# Patient Record
Sex: MALE | Race: WHITE | NOT HISPANIC OR LATINO
[De-identification: names, ages, dates, MRNs, and addresses within clinical notes are randomized per-mention and may not be internally consistent; named-entity substitution may affect disease eponyms.]

---

## 2017-04-04 PROBLEM — Z00.00 ENCOUNTER FOR PREVENTIVE HEALTH EXAMINATION: Status: ACTIVE | Noted: 2017-04-04

## 2017-04-13 ENCOUNTER — RECORD ABSTRACTING (OUTPATIENT)
Age: 62
End: 2017-04-13

## 2017-04-13 DIAGNOSIS — K57.90 DIVERTICULOSIS OF INTESTINE, PART UNSPECIFIED, W/OUT PERFORATION OR ABSCESS W/OUT BLEEDING: ICD-10-CM

## 2017-04-13 DIAGNOSIS — Z87.440 PERSONAL HISTORY OF URINARY (TRACT) INFECTIONS: ICD-10-CM

## 2017-04-13 DIAGNOSIS — R56.9 UNSPECIFIED CONVULSIONS: ICD-10-CM

## 2017-04-13 DIAGNOSIS — Z87.19 PERSONAL HISTORY OF OTHER DISEASES OF THE DIGESTIVE SYSTEM: ICD-10-CM

## 2017-04-20 ENCOUNTER — APPOINTMENT (OUTPATIENT)
Dept: UROLOGY | Facility: CLINIC | Age: 62
End: 2017-04-20

## 2017-04-22 ENCOUNTER — EMERGENCY (EMERGENCY)
Facility: HOSPITAL | Age: 62
LOS: 0 days | Discharge: HOME | End: 2017-04-22

## 2017-04-25 ENCOUNTER — APPOINTMENT (OUTPATIENT)
Dept: SURGERY | Facility: CLINIC | Age: 62
End: 2017-04-25

## 2017-04-25 VITALS — SYSTOLIC BLOOD PRESSURE: 118 MMHG | DIASTOLIC BLOOD PRESSURE: 76 MMHG

## 2017-04-25 DIAGNOSIS — R13.10 DYSPHAGIA, UNSPECIFIED: ICD-10-CM

## 2017-05-02 PROBLEM — R13.10 DYSPHAGIA, UNSPECIFIED TYPE: Status: RESOLVED | Noted: 2017-04-25 | Resolved: 2017-05-02

## 2017-05-16 ENCOUNTER — APPOINTMENT (OUTPATIENT)
Dept: UROLOGY | Facility: CLINIC | Age: 62
End: 2017-05-16

## 2017-06-27 DIAGNOSIS — Z93.1 GASTROSTOMY STATUS: ICD-10-CM

## 2017-06-27 DIAGNOSIS — G82.50 QUADRIPLEGIA, UNSPECIFIED: ICD-10-CM

## 2017-06-27 DIAGNOSIS — K94.22 GASTROSTOMY INFECTION: ICD-10-CM

## 2017-07-12 ENCOUNTER — APPOINTMENT (OUTPATIENT)
Dept: UROLOGY | Facility: CLINIC | Age: 62
End: 2017-07-12

## 2017-07-12 VITALS — SYSTOLIC BLOOD PRESSURE: 120 MMHG | DIASTOLIC BLOOD PRESSURE: 77 MMHG | HEART RATE: 80 BPM

## 2017-07-12 DIAGNOSIS — Z78.9 OTHER SPECIFIED HEALTH STATUS: ICD-10-CM

## 2017-07-27 ENCOUNTER — OUTPATIENT (OUTPATIENT)
Dept: OUTPATIENT SERVICES | Facility: HOSPITAL | Age: 62
LOS: 1 days | Discharge: HOME | End: 2017-07-27

## 2017-07-27 DIAGNOSIS — Z01.818 ENCOUNTER FOR OTHER PREPROCEDURAL EXAMINATION: ICD-10-CM

## 2017-07-27 DIAGNOSIS — N20.2 CALCULUS OF KIDNEY WITH CALCULUS OF URETER: ICD-10-CM

## 2017-07-27 DIAGNOSIS — G80.1 SPASTIC DIPLEGIC CEREBRAL PALSY: ICD-10-CM

## 2017-07-27 DIAGNOSIS — R13.10 DYSPHAGIA, UNSPECIFIED: ICD-10-CM

## 2017-07-27 DIAGNOSIS — G40.909 EPILEPSY, UNSPECIFIED, NOT INTRACTABLE, WITHOUT STATUS EPILEPTICUS: ICD-10-CM

## 2017-08-04 ENCOUNTER — OUTPATIENT (OUTPATIENT)
Dept: OUTPATIENT SERVICES | Facility: HOSPITAL | Age: 62
LOS: 1 days | Discharge: HOME | End: 2017-08-04

## 2017-08-04 ENCOUNTER — APPOINTMENT (OUTPATIENT)
Dept: UROLOGY | Facility: CLINIC | Age: 62
End: 2017-08-04
Payer: MEDICARE

## 2017-08-04 DIAGNOSIS — R13.10 DYSPHAGIA, UNSPECIFIED: ICD-10-CM

## 2017-08-04 DIAGNOSIS — G40.909 EPILEPSY, UNSPECIFIED, NOT INTRACTABLE, WITHOUT STATUS EPILEPTICUS: ICD-10-CM

## 2017-08-04 DIAGNOSIS — G80.1 SPASTIC DIPLEGIC CEREBRAL PALSY: ICD-10-CM

## 2017-08-04 PROCEDURE — 52005 CYSTO W/URTRL CATHJ: CPT | Mod: 59

## 2017-08-04 PROCEDURE — 52332 CYSTOSCOPY AND TREATMENT: CPT | Mod: LT

## 2017-08-15 ENCOUNTER — OUTPATIENT (OUTPATIENT)
Dept: OUTPATIENT SERVICES | Facility: HOSPITAL | Age: 62
LOS: 1 days | Discharge: HOME | End: 2017-08-15

## 2017-08-15 DIAGNOSIS — G80.1 SPASTIC DIPLEGIC CEREBRAL PALSY: ICD-10-CM

## 2017-08-15 DIAGNOSIS — R13.10 DYSPHAGIA, UNSPECIFIED: ICD-10-CM

## 2017-08-15 DIAGNOSIS — G40.909 EPILEPSY, UNSPECIFIED, NOT INTRACTABLE, WITHOUT STATUS EPILEPTICUS: ICD-10-CM

## 2017-08-18 DIAGNOSIS — N13.2 HYDRONEPHROSIS WITH RENAL AND URETERAL CALCULOUS OBSTRUCTION: ICD-10-CM

## 2017-08-18 DIAGNOSIS — K59.00 CONSTIPATION, UNSPECIFIED: ICD-10-CM

## 2017-08-18 DIAGNOSIS — R07.9 CHEST PAIN, UNSPECIFIED: ICD-10-CM

## 2017-08-18 DIAGNOSIS — Q43.8 OTHER SPECIFIED CONGENITAL MALFORMATIONS OF INTESTINE: ICD-10-CM

## 2017-08-18 DIAGNOSIS — K64.8 OTHER HEMORRHOIDS: ICD-10-CM

## 2017-09-19 ENCOUNTER — OUTPATIENT (OUTPATIENT)
Dept: OUTPATIENT SERVICES | Facility: HOSPITAL | Age: 62
LOS: 1 days | Discharge: HOME | End: 2017-09-19

## 2017-09-19 DIAGNOSIS — N20.2 CALCULUS OF KIDNEY WITH CALCULUS OF URETER: ICD-10-CM

## 2017-09-19 DIAGNOSIS — Z01.818 ENCOUNTER FOR OTHER PREPROCEDURAL EXAMINATION: ICD-10-CM

## 2017-09-19 DIAGNOSIS — G80.1 SPASTIC DIPLEGIC CEREBRAL PALSY: ICD-10-CM

## 2017-09-19 DIAGNOSIS — G40.909 EPILEPSY, UNSPECIFIED, NOT INTRACTABLE, WITHOUT STATUS EPILEPTICUS: ICD-10-CM

## 2017-09-19 DIAGNOSIS — R13.10 DYSPHAGIA, UNSPECIFIED: ICD-10-CM

## 2017-09-22 ENCOUNTER — APPOINTMENT (OUTPATIENT)
Dept: UROLOGY | Facility: HOSPITAL | Age: 62
End: 2017-09-22
Payer: MEDICARE

## 2017-09-22 ENCOUNTER — OUTPATIENT (OUTPATIENT)
Dept: OUTPATIENT SERVICES | Facility: HOSPITAL | Age: 62
LOS: 1 days | Discharge: HOME | End: 2017-09-22

## 2017-09-22 DIAGNOSIS — R13.10 DYSPHAGIA, UNSPECIFIED: ICD-10-CM

## 2017-09-22 DIAGNOSIS — G80.1 SPASTIC DIPLEGIC CEREBRAL PALSY: ICD-10-CM

## 2017-09-22 DIAGNOSIS — G40.909 EPILEPSY, UNSPECIFIED, NOT INTRACTABLE, WITHOUT STATUS EPILEPTICUS: ICD-10-CM

## 2017-09-22 PROCEDURE — 52356 CYSTO/URETERO W/LITHOTRIPSY: CPT | Mod: LT

## 2017-09-27 DIAGNOSIS — N20.2 CALCULUS OF KIDNEY WITH CALCULUS OF URETER: ICD-10-CM

## 2017-10-11 ENCOUNTER — APPOINTMENT (OUTPATIENT)
Dept: UROLOGY | Facility: CLINIC | Age: 62
End: 2017-10-11
Payer: MEDICARE

## 2017-10-11 PROCEDURE — 52310 CYSTOSCOPY AND TREATMENT: CPT

## 2017-10-11 RX ORDER — KETOCONAZOLE 20 MG/G
2 CREAM TOPICAL
Refills: 0 | Status: ACTIVE | COMMUNITY

## 2017-10-11 RX ORDER — HYDROCORTISONE 25 MG/G
2.5 CREAM TOPICAL
Refills: 0 | Status: ACTIVE | COMMUNITY

## 2017-10-11 RX ORDER — CICLOPIROX 7.7 MG/G
0.77 GEL TOPICAL
Refills: 0 | Status: ACTIVE | COMMUNITY

## 2017-10-11 RX ORDER — MINERAL OIL, PETROLATUM, PHENYLEPHRINE HCL 2.5; 140; 749 MG/G; MG/G; MG/G
0.25-14-74.9 OINTMENT TOPICAL
Refills: 0 | Status: ACTIVE | COMMUNITY

## 2017-10-11 RX ORDER — HYDROCORTISONE 1 %
12 CREAM (GRAM) TOPICAL
Refills: 0 | Status: ACTIVE | COMMUNITY

## 2017-10-11 RX ORDER — NYSTATIN 100000 [USP'U]/ML
100000 SUSPENSION ORAL
Refills: 0 | Status: ACTIVE | COMMUNITY

## 2017-10-11 RX ORDER — ALCLOMETASONE DIPROPIONATE 0.5 MG/G
0.05 CREAM TOPICAL
Refills: 0 | Status: ACTIVE | COMMUNITY

## 2017-10-11 RX ORDER — MAGNESIUM HYDROXIDE 400 MG/5ML
400 SUSPENSION, ORAL (FINAL DOSE FORM) ORAL
Refills: 0 | Status: ACTIVE | COMMUNITY

## 2017-10-11 RX ORDER — CHLORHEXIDINE GLUCONATE, 0.12% ORAL RINSE 1.2 MG/ML
0.12 SOLUTION DENTAL
Refills: 0 | Status: ACTIVE | COMMUNITY

## 2017-10-11 RX ORDER — POLYVINYL ALCOHOL 14 MG/ML
1.4 SOLUTION/ DROPS OPHTHALMIC
Refills: 0 | Status: ACTIVE | COMMUNITY

## 2017-10-11 RX ORDER — DESONIDE 0.5 MG/G
0.05 CREAM TOPICAL
Refills: 0 | Status: ACTIVE | COMMUNITY

## 2017-10-16 ENCOUNTER — EMERGENCY (EMERGENCY)
Facility: HOSPITAL | Age: 62
LOS: 0 days | Discharge: HOME | End: 2017-10-16

## 2017-10-16 DIAGNOSIS — K94.23 GASTROSTOMY MALFUNCTION: ICD-10-CM

## 2017-10-16 DIAGNOSIS — G40.909 EPILEPSY, UNSPECIFIED, NOT INTRACTABLE, WITHOUT STATUS EPILEPTICUS: ICD-10-CM

## 2017-10-16 DIAGNOSIS — R13.10 DYSPHAGIA, UNSPECIFIED: ICD-10-CM

## 2017-10-16 DIAGNOSIS — G80.1 SPASTIC DIPLEGIC CEREBRAL PALSY: ICD-10-CM

## 2017-12-06 ENCOUNTER — EMERGENCY (EMERGENCY)
Facility: HOSPITAL | Age: 62
LOS: 0 days | Discharge: HOME | End: 2017-12-06

## 2017-12-06 DIAGNOSIS — Z79.899 OTHER LONG TERM (CURRENT) DRUG THERAPY: ICD-10-CM

## 2017-12-06 DIAGNOSIS — F79 UNSPECIFIED INTELLECTUAL DISABILITIES: ICD-10-CM

## 2017-12-06 DIAGNOSIS — R56.9 UNSPECIFIED CONVULSIONS: ICD-10-CM

## 2017-12-06 DIAGNOSIS — K94.23 GASTROSTOMY MALFUNCTION: ICD-10-CM

## 2017-12-06 DIAGNOSIS — G80.1 SPASTIC DIPLEGIC CEREBRAL PALSY: ICD-10-CM

## 2017-12-06 DIAGNOSIS — R13.10 DYSPHAGIA, UNSPECIFIED: ICD-10-CM

## 2017-12-06 DIAGNOSIS — G40.909 EPILEPSY, UNSPECIFIED, NOT INTRACTABLE, WITHOUT STATUS EPILEPTICUS: ICD-10-CM

## 2017-12-13 ENCOUNTER — APPOINTMENT (OUTPATIENT)
Dept: UROLOGY | Facility: CLINIC | Age: 62
End: 2017-12-13

## 2017-12-15 ENCOUNTER — RX RENEWAL (OUTPATIENT)
Age: 62
End: 2017-12-15

## 2018-01-25 ENCOUNTER — EMERGENCY (EMERGENCY)
Facility: HOSPITAL | Age: 63
LOS: 0 days | Discharge: NURSING, REHAB OR LONG-TERM CARE | End: 2018-01-26

## 2018-01-25 DIAGNOSIS — R13.10 DYSPHAGIA, UNSPECIFIED: ICD-10-CM

## 2018-01-25 DIAGNOSIS — N39.0 URINARY TRACT INFECTION, SITE NOT SPECIFIED: ICD-10-CM

## 2018-01-25 DIAGNOSIS — Z79.899 OTHER LONG TERM (CURRENT) DRUG THERAPY: ICD-10-CM

## 2018-01-25 DIAGNOSIS — G40.909 EPILEPSY, UNSPECIFIED, NOT INTRACTABLE, WITHOUT STATUS EPILEPTICUS: ICD-10-CM

## 2018-01-25 DIAGNOSIS — Z79.51 LONG TERM (CURRENT) USE OF INHALED STEROIDS: ICD-10-CM

## 2018-01-25 DIAGNOSIS — R30.0 DYSURIA: ICD-10-CM

## 2018-01-25 DIAGNOSIS — G80.1 SPASTIC DIPLEGIC CEREBRAL PALSY: ICD-10-CM

## 2018-01-25 DIAGNOSIS — R56.9 UNSPECIFIED CONVULSIONS: ICD-10-CM

## 2018-01-29 ENCOUNTER — APPOINTMENT (OUTPATIENT)
Dept: UROLOGY | Facility: CLINIC | Age: 63
End: 2018-01-29
Payer: MEDICARE

## 2018-01-29 VITALS — HEART RATE: 95 BPM | DIASTOLIC BLOOD PRESSURE: 71 MMHG | SYSTOLIC BLOOD PRESSURE: 103 MMHG

## 2018-01-29 PROCEDURE — 99213 OFFICE O/P EST LOW 20 MIN: CPT

## 2018-02-26 ENCOUNTER — APPOINTMENT (OUTPATIENT)
Dept: UROLOGY | Facility: CLINIC | Age: 63
End: 2018-02-26
Payer: MEDICARE

## 2018-02-26 PROCEDURE — 99213 OFFICE O/P EST LOW 20 MIN: CPT

## 2018-03-26 ENCOUNTER — APPOINTMENT (OUTPATIENT)
Dept: UROLOGY | Facility: CLINIC | Age: 63
End: 2018-03-26
Payer: MEDICARE

## 2018-03-26 VITALS — DIASTOLIC BLOOD PRESSURE: 69 MMHG | HEART RATE: 70 BPM | SYSTOLIC BLOOD PRESSURE: 104 MMHG

## 2018-03-26 PROCEDURE — 99213 OFFICE O/P EST LOW 20 MIN: CPT

## 2018-03-26 NOTE — LETTER BODY
[Dear  ___] : Dear  [unfilled], [I had the pleasure of evaluating your patient, [unfilled]. Thank you for referring this patient for consultation for ___] : I had the pleasure of evaluating your patient, [unfilled]. Thank you for referring this patient for consultation for [unfilled]. [Attached please find my note.] : Attached please find my note. [Thank you very much for allowing me to participate in the care of this patient. If you have any questions, please do not hesitate to contact me] : Thank you very much for allowing me to participate in the care of this patient. If you have any questions, please do not hesitate to contact me. [FreeTextEntry2] : Dr. Brigido aRo

## 2018-03-26 NOTE — PHYSICAL EXAM
[General Appearance - Well Developed] : well developed [General Appearance - Well Nourished] : well nourished [Normal Appearance] : normal appearance [Well Groomed] : well groomed [General Appearance - In No Acute Distress] : no acute distress [Abdomen Soft] : soft [Abdomen Tenderness] : non-tender [Costovertebral Angle Tenderness] : no ~M costovertebral angle tenderness [Edema] : no peripheral edema [] : no respiratory distress [Respiration, Rhythm And Depth] : normal respiratory rhythm and effort [Exaggerated Use Of Accessory Muscles For Inspiration] : no accessory muscle use [Oriented To Time, Place, And Person] : oriented to person, place, and time [Mood] : the mood was normal [Not Anxious] : not anxious [No Focal Deficits] : no focal deficits [FreeTextEntry1] : Wheelchair bound

## 2018-03-26 NOTE — HISTORY OF PRESENT ILLNESS
[Currently Experiencing ___] :  [unfilled] [Urinary Incontinence] : urinary incontinence [Urinary Retention] : urinary retention [Urinary Frequency] : urinary frequency [Dysuria] : dysuria [None] : None [FreeTextEntry1] : TISH SHAIKH is a 62 year old male with a past medical history of cerebral palsy and urinary retention . Presents to the office today for a follow up, last seen on 2/26/2018. Patient resides in [facility] and is here accompanied by caregiver. Patient on Finasteride 5 mg daily and Flomax and no side effects reported, for BPH with urinary obstruction.Patient did not see a nephrologist as of now, as per Sadia, Nurse states that appointment will be made. Patient currently experiencing dysuria and urinary frequency x 3 days, no fever or chills. Patient PVR in office is 89 ml, patient is incontinent, wears adult depends daily.\par Patient UA and Urine culture ordered, nurse to perform in home. \par US 3/15/2018.- Prevoid 293 cc, Postvoid 218 cc, Prostate not assessed, Diffuse bladder wall thickening up to 4.6 mm. No bladder mass and no calculus.  [Urinary Urgency] : no urinary urgency [Hematuria - Gross] : no gross hematuria

## 2018-03-26 NOTE — ASSESSMENT
[FreeTextEntry1] : TISH SHAIKH is a 62 year old male with a past medical history of cerebral palsy and urinary retention . Presents to the office today for a follow up, last seen on 2/26/2018. Patient resides in [facility] and is here accompanied by caregiver. Patient on Finasteride 5 mg daily and Flomax and no side effects reported, for BPH with urinary obstruction.Patient did not see a nephrologist as of now, as per Sadia, Nurse states that appointment will be made. Patient currently experiencing dysuria and urinary frequency x 3 days, no fever or chills. Patient PVR in office is 89 ml, patient is incontinent, wears adult depends daily.\par Patient UA and Urine culture ordered, nurse to perform in home. \par US 3/15/2018.- Prevoid 293 cc, Postvoid 218 cc, Prostate unable to assess, Diffuse bladder wall thickening up to 4.6 cm. No bladder mass and no calculus.

## 2018-03-27 ENCOUNTER — EMERGENCY (EMERGENCY)
Facility: HOSPITAL | Age: 63
LOS: 0 days | Discharge: HOME | End: 2018-03-27
Attending: EMERGENCY MEDICINE

## 2018-03-27 VITALS
TEMPERATURE: 98 F | SYSTOLIC BLOOD PRESSURE: 110 MMHG | OXYGEN SATURATION: 96 % | RESPIRATION RATE: 20 BRPM | HEART RATE: 80 BPM | DIASTOLIC BLOOD PRESSURE: 58 MMHG

## 2018-03-27 VITALS
OXYGEN SATURATION: 97 % | HEART RATE: 81 BPM | SYSTOLIC BLOOD PRESSURE: 142 MMHG | RESPIRATION RATE: 19 BRPM | DIASTOLIC BLOOD PRESSURE: 61 MMHG | TEMPERATURE: 98 F

## 2018-03-27 DIAGNOSIS — Z79.899 OTHER LONG TERM (CURRENT) DRUG THERAPY: ICD-10-CM

## 2018-03-27 DIAGNOSIS — Z93.1 GASTROSTOMY STATUS: Chronic | ICD-10-CM

## 2018-03-27 DIAGNOSIS — R33.9 RETENTION OF URINE, UNSPECIFIED: ICD-10-CM

## 2018-03-27 DIAGNOSIS — Z98.890 OTHER SPECIFIED POSTPROCEDURAL STATES: ICD-10-CM

## 2018-03-27 DIAGNOSIS — R30.0 DYSURIA: ICD-10-CM

## 2018-03-27 DIAGNOSIS — N39.0 URINARY TRACT INFECTION, SITE NOT SPECIFIED: ICD-10-CM

## 2018-03-27 LAB
ALBUMIN SERPL ELPH-MCNC: 4 G/DL — SIGNIFICANT CHANGE UP (ref 3.5–5.2)
ALP SERPL-CCNC: 86 U/L — SIGNIFICANT CHANGE UP (ref 30–115)
ALT FLD-CCNC: 20 U/L — SIGNIFICANT CHANGE UP (ref 0–41)
ANION GAP SERPL CALC-SCNC: 11 MMOL/L — SIGNIFICANT CHANGE UP (ref 7–14)
APPEARANCE UR: (no result)
AST SERPL-CCNC: 31 U/L — SIGNIFICANT CHANGE UP (ref 0–41)
BASOPHILS # BLD AUTO: 0.02 K/UL — SIGNIFICANT CHANGE UP (ref 0–0.2)
BASOPHILS NFR BLD AUTO: 0.4 % — SIGNIFICANT CHANGE UP (ref 0–1)
BILIRUB SERPL-MCNC: 0.3 MG/DL — SIGNIFICANT CHANGE UP (ref 0.2–1.2)
BILIRUB UR-MCNC: NEGATIVE — SIGNIFICANT CHANGE UP
BUN SERPL-MCNC: 21 MG/DL — HIGH (ref 10–20)
CALCIUM SERPL-MCNC: 8.9 MG/DL — SIGNIFICANT CHANGE UP (ref 8.5–10.1)
CHLORIDE SERPL-SCNC: 100 MMOL/L — SIGNIFICANT CHANGE UP (ref 98–110)
CO2 SERPL-SCNC: 27 MMOL/L — SIGNIFICANT CHANGE UP (ref 17–32)
COLOR SPEC: YELLOW — SIGNIFICANT CHANGE UP
CREAT SERPL-MCNC: 0.5 MG/DL — LOW (ref 0.7–1.5)
DIFF PNL FLD: (no result)
EOSINOPHIL # BLD AUTO: 0.26 K/UL — SIGNIFICANT CHANGE UP (ref 0–0.7)
EOSINOPHIL NFR BLD AUTO: 5.1 % — SIGNIFICANT CHANGE UP (ref 0–8)
GLUCOSE SERPL-MCNC: 96 MG/DL — SIGNIFICANT CHANGE UP (ref 70–99)
GLUCOSE UR QL: NEGATIVE MG/DL — SIGNIFICANT CHANGE UP
HCT VFR BLD CALC: 40 % — LOW (ref 42–52)
HGB BLD-MCNC: 13.7 G/DL — LOW (ref 14–18)
IMM GRANULOCYTES NFR BLD AUTO: 0.2 % — SIGNIFICANT CHANGE UP (ref 0.1–0.3)
KETONES UR-MCNC: NEGATIVE — SIGNIFICANT CHANGE UP
LEUKOCYTE ESTERASE UR-ACNC: (no result)
LYMPHOCYTES # BLD AUTO: 1.06 K/UL — LOW (ref 1.2–3.4)
LYMPHOCYTES # BLD AUTO: 20.8 % — SIGNIFICANT CHANGE UP (ref 20.5–51.1)
MCHC RBC-ENTMCNC: 31.1 PG — HIGH (ref 27–31)
MCHC RBC-ENTMCNC: 34.3 G/DL — SIGNIFICANT CHANGE UP (ref 32–37)
MCV RBC AUTO: 90.9 FL — SIGNIFICANT CHANGE UP (ref 80–94)
MONOCYTES # BLD AUTO: 0.52 K/UL — SIGNIFICANT CHANGE UP (ref 0.1–0.6)
MONOCYTES NFR BLD AUTO: 10.2 % — HIGH (ref 1.7–9.3)
NEUTROPHILS # BLD AUTO: 3.22 K/UL — SIGNIFICANT CHANGE UP (ref 1.4–6.5)
NEUTROPHILS NFR BLD AUTO: 63.3 % — SIGNIFICANT CHANGE UP (ref 42.2–75.2)
NITRITE UR-MCNC: NEGATIVE — SIGNIFICANT CHANGE UP
PH UR: 7.5 — SIGNIFICANT CHANGE UP (ref 5–8)
PLATELET # BLD AUTO: 182 K/UL — SIGNIFICANT CHANGE UP (ref 130–400)
POTASSIUM SERPL-MCNC: 4.4 MMOL/L — SIGNIFICANT CHANGE UP (ref 3.5–5)
POTASSIUM SERPL-SCNC: 4.4 MMOL/L — SIGNIFICANT CHANGE UP (ref 3.5–5)
PROT SERPL-MCNC: 6.7 G/DL — SIGNIFICANT CHANGE UP (ref 6–8)
PROT UR-MCNC: 30 MG/DL
RBC # BLD: 4.4 M/UL — LOW (ref 4.7–6.1)
RBC # FLD: 13.4 % — SIGNIFICANT CHANGE UP (ref 11.5–14.5)
RBC CASTS # UR COMP ASSIST: (no result) /HPF
SODIUM SERPL-SCNC: 138 MMOL/L — SIGNIFICANT CHANGE UP (ref 135–146)
SP GR SPEC: 1.01 — SIGNIFICANT CHANGE UP (ref 1.01–1.03)
UROBILINOGEN FLD QL: 0.2 MG/DL — SIGNIFICANT CHANGE UP (ref 0.2–0.2)
WBC # BLD: 5.09 K/UL — SIGNIFICANT CHANGE UP (ref 4.8–10.8)
WBC # FLD AUTO: 5.09 K/UL — SIGNIFICANT CHANGE UP (ref 4.8–10.8)
WBC UR QL: SIGNIFICANT CHANGE UP /HPF

## 2018-03-27 RX ORDER — LIDOCAINE HCL 20 MG/ML
5 VIAL (ML) INJECTION ONCE
Qty: 0 | Refills: 0 | Status: COMPLETED | OUTPATIENT
Start: 2018-03-27 | End: 2018-03-27

## 2018-03-27 RX ORDER — CIPROFLOXACIN LACTATE 400MG/40ML
500 VIAL (ML) INTRAVENOUS EVERY 12 HOURS
Qty: 0 | Refills: 0 | Status: DISCONTINUED | OUTPATIENT
Start: 2018-03-27 | End: 2018-03-27

## 2018-03-27 RX ORDER — MORPHINE SULFATE 50 MG/1
4 CAPSULE, EXTENDED RELEASE ORAL ONCE
Qty: 0 | Refills: 0 | Status: DISCONTINUED | OUTPATIENT
Start: 2018-03-27 | End: 2018-03-27

## 2018-03-27 RX ORDER — CIPROFLOXACIN LACTATE 400MG/40ML
1 VIAL (ML) INTRAVENOUS
Qty: 27 | Refills: 0 | OUTPATIENT
Start: 2018-03-27 | End: 2018-04-09

## 2018-03-27 RX ORDER — SODIUM CHLORIDE 9 MG/ML
1000 INJECTION INTRAMUSCULAR; INTRAVENOUS; SUBCUTANEOUS ONCE
Qty: 0 | Refills: 0 | Status: COMPLETED | OUTPATIENT
Start: 2018-03-27 | End: 2018-03-27

## 2018-03-27 RX ORDER — ONDANSETRON 8 MG/1
4 TABLET, FILM COATED ORAL ONCE
Qty: 0 | Refills: 0 | Status: COMPLETED | OUTPATIENT
Start: 2018-03-27 | End: 2018-03-27

## 2018-03-27 RX ORDER — CIPROFLOXACIN LACTATE 400MG/40ML
500 VIAL (ML) INTRAVENOUS ONCE
Qty: 0 | Refills: 0 | Status: COMPLETED | OUTPATIENT
Start: 2018-03-27 | End: 2018-03-27

## 2018-03-27 RX ADMIN — Medication 5 MILLILITER(S): at 19:19

## 2018-03-27 RX ADMIN — Medication 500 MILLIGRAM(S): at 19:56

## 2018-03-27 RX ADMIN — ONDANSETRON 4 MILLIGRAM(S): 8 TABLET, FILM COATED ORAL at 21:15

## 2018-03-27 RX ADMIN — MORPHINE SULFATE 4 MILLIGRAM(S): 50 CAPSULE, EXTENDED RELEASE ORAL at 19:19

## 2018-03-27 NOTE — ED PROVIDER NOTE - ATTENDING CONTRIBUTION TO CARE
62M PMH Cerebral palsy, wheelchair bound, PEG tube, kidney stones, UTIs, BPH, p/w 3-4 days dysuria, freq. No hematuria. No abdominal pain. No flank pain. No fever, chills. No nvdc. No cp, sob. Seen at urology clinic yest dr caceres, had PVR =89, UA/cx ordered but unable to obtain. lives a facility, aid at bedside. on exam, AFVSS, well dee nad, ncat, eomi, perrla, mmm, lctab, rrr nl s1s2 no mrg, abd soft ntnd, no cvat, aaox3, bilat UE contractures, no facial droop, +developmental delay, no le edema or calf ttp, a/p;   Concern for UTI, will do labs, UA/Cx, and re-eval. H&P not c/w pyelo, renal colic.

## 2018-03-27 NOTE — ED PROVIDER NOTE - PROGRESS NOTE DETAILS
RN unable to get urine specimen. Called urology PA at [phone] who has agreed to help collect the specimen CATIE HUNG Richard unable to obtain urine, Dr Joyner was able to get UA specimen, is now placing alatorre over wire.  pt post void residual in  cc per Dr Joyner Dr Caceres placed alatorre with cystoscopy at bedside, recommends dc home w cipro, 1 week outpt f/u dr caceres pt states he feels nauseous after cipro flushed peg by RN, abd soft NTND, no abd pain, pt states he feels much better after alatorre placed, which is draining clear yellow urine, will give zofran OTD, strict return precautions provided to patient and aid at bedside pt states he feels nauseous after cipro flushed peg by RN, abd soft NTND, no abd pain, pt states he feels much better after alatorre placed, which is draining clear yellow urine, will give zofran OTD, strict return precautions provided to patient and aid at bedside. Discussed w CATIE Samayoa who agrees.

## 2018-03-27 NOTE — CONSULT NOTE ADULT - ATTENDING COMMENTS
Agree with above.   Patient with prior history of rentention.  was seen in office this week with a PVR on bladder scan of about 80ml, however patient returned to ER next day with urinary retention. Bedside scan showed about 500ml urine.  Multiple atttemts to catheterize failed    Procedure Note:  Cysto at bedside was performed using sterile technique.  multiple soft stricutres seen throughout penile urethra.  firm stricture at the bulbous urethra  this stricutre was wired with senor wire and the cystoscope was pushed through into the bladder which was filled with cloudy urine  The patient then spontaneously voided  The wire was advanced into the bladder and a 16F coude catheter was placed  Patient tolerated the procedure well  Was sent home with one week of abx  Would benefit from SPT placement in OR

## 2018-03-27 NOTE — CONSULT NOTE ADULT - SUBJECTIVE AND OBJECTIVE BOX
Patient is a 62y old  Male who presents with a chief complaint of dysuria. pt unable to empty bladder well. Has hx of cerebral palsy, has required alatorre cath at home multiple times in the past. bedside sono shows distended bladder. No fevers, chills, N/V. Attempted to straight cath by nurse, was unable to get sample. attempted to pass 18 fr coude cath under sterile technique, unsuccessfully. Attempted urethral dilation at bedside using sensor wire. unable to pass through successfully.    Bedside cystoscopy performed under sterile technique, pt tolerated it well, placed 16 fr [place] tip catheter. drained 200cc clear yellow urine. balloon inflated to 10cc    HPI:      PAST MEDICAL & SURGICAL HISTORY:  Seizure  Cerebral palsy  BPH (benign prostatic hyperplasia)  S/P percutaneous endoscopic gastrostomy (PEG) tube placement      REVIEW OF SYSTEMS:      MEDICATIONS  (STANDING):  ciprofloxacin     Tablet 500 milliGRAM(s) Oral Once  ciprofloxacin     Tablet 500 milliGRAM(s) Oral every 12 hours    MEDICATIONS  (PRN):      Allergies    No Known Allergies    Intolerances        SOCIAL HISTORY: No illicit drug use    FAMILY HISTORY:  Family history unknown      Vital Signs Last 24 Hrs  T(C): 36.4 (27 Mar 2018 16:00), Max: 36.8 (27 Mar 2018 13:31)  T(F): 97.5 (27 Mar 2018 16:00), Max: 98.2 (27 Mar 2018 13:31)  HR: 80 (27 Mar 2018 16:00) (80 - 81)  BP: 110/58 (27 Mar 2018 16:00) (110/58 - 142/61)  BP(mean): --  RR: 20 (27 Mar 2018 16:00) (19 - 20)  SpO2: 96% (27 Mar 2018 16:00) (96% - 97%)    PHYSICAL EXAM:    Constitutional: NAD,  HEENT: EOMI  Neck: no pain  Back: No CVA tenderness b/l  Respiratory: No accessory respiratory muscle use  Abd: Soft, NT/ND  no organomegally  no hernia, bladder nonpalp, no suprapubic tenderness      I&O's Summary      LABS:                        13.7   5.09  )-----------( 182      ( 27 Mar 2018 15:13 )             40.0     03-27    138  |  100  |  21<H>  ----------------------------<  96  4.4   |  27  |  0.5<L>    Ca    8.9      27 Mar 2018 15:13    TPro  6.7  /  Alb  4.0  /  TBili  0.3  /  DBili  x   /  AST  31  /  ALT  20  /  AlkPhos  86  03-27      Urinalysis Basic - ( 27 Mar 2018 18:22 )    Color: Yellow / Appearance: Cloudy / S.015 / pH: x  Gluc: x / Ketone: Negative  / Bili: Negative / Urobili: 0.2 mg/dL   Blood: x / Protein: 30 mg/dL / Nitrite: Negative   Leuk Esterase: Trace / RBC: 25-50 /HPF / WBC 1-2 /HPF   Sq Epi: x / Non Sq Epi: x / Bacteria: x      Urine Culture:         RADIOLOGY & ADDITIONAL STUDIES:

## 2018-03-27 NOTE — CONSULT NOTE ADULT - ASSESSMENT
63 y/o male with urethral stricture  - d/c home with alatorre catheter  - Abx x1 week  - f/u w Dr. Joyner in 1 week  - Seen, examined, and bedside procedures by Dr. Joyner

## 2018-03-27 NOTE — ED PROVIDER NOTE - OBJECTIVE STATEMENT
62M w cerebral palsy presents with dysuria for a "couple of days"  endorses burning with urination  was seen in urology yesterday, was found to have PVR of 89  known history of nephrolithiasis

## 2018-03-27 NOTE — CONSULT NOTE ADULT - CONSULT REASON
dysuria, inability to catheterize pt, urethral stricture dysuria, inability to catheterize pt, urethral stricture, urinary rentention

## 2018-03-28 LAB
CULTURE RESULTS: SIGNIFICANT CHANGE UP
SPECIMEN SOURCE: SIGNIFICANT CHANGE UP

## 2018-03-30 ENCOUNTER — APPOINTMENT (OUTPATIENT)
Dept: UROLOGY | Facility: CLINIC | Age: 63
End: 2018-03-30
Payer: MEDICARE

## 2018-03-30 VITALS — SYSTOLIC BLOOD PRESSURE: 109 MMHG | HEART RATE: 84 BPM | DIASTOLIC BLOOD PRESSURE: 68 MMHG

## 2018-03-30 PROCEDURE — 99213 OFFICE O/P EST LOW 20 MIN: CPT

## 2018-04-07 ENCOUNTER — EMERGENCY (EMERGENCY)
Facility: HOSPITAL | Age: 63
LOS: 0 days | Discharge: HOME | End: 2018-04-07
Attending: EMERGENCY MEDICINE

## 2018-04-07 VITALS
OXYGEN SATURATION: 92 % | DIASTOLIC BLOOD PRESSURE: 61 MMHG | TEMPERATURE: 98 F | RESPIRATION RATE: 18 BRPM | HEART RATE: 99 BPM | SYSTOLIC BLOOD PRESSURE: 130 MMHG

## 2018-04-07 VITALS
SYSTOLIC BLOOD PRESSURE: 132 MMHG | TEMPERATURE: 98 F | RESPIRATION RATE: 18 BRPM | OXYGEN SATURATION: 97 % | HEART RATE: 86 BPM | DIASTOLIC BLOOD PRESSURE: 69 MMHG
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
## 2018-04-07 DIAGNOSIS — Z79.899 OTHER LONG TERM (CURRENT) DRUG THERAPY: ICD-10-CM
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
## 2018-04-07 DIAGNOSIS — Z79.2 LONG TERM (CURRENT) USE OF ANTIBIOTICS: ICD-10-CM

## 2018-04-07 DIAGNOSIS — T83.038A LEAKAGE OF OTHER URINARY CATHETER, INITIAL ENCOUNTER: ICD-10-CM

## 2018-04-07 DIAGNOSIS — Z93.1 GASTROSTOMY STATUS: Chronic | ICD-10-CM

## 2018-04-07 DIAGNOSIS — N40.0 BENIGN PROSTATIC HYPERPLASIA WITHOUT LOWER URINARY TRACT SYMPTOMS: ICD-10-CM

## 2018-04-07 NOTE — ED PROVIDER NOTE - PHYSICAL EXAMINATION
VITAL SIGNS: AFebrile, vital signs stable  CONSTITUTIONAL: Well-developed; well-nourished; in no acute distress.  SKIN: Skin exam is warm and dry, no acute rash.  HEAD: Normocephalic; atraumatic.  EYES: Pupils equal round reactive to light, Extraocular movements intact; conjunctiva and sclera clear.  ENT: No nasal discharge; airway clear. Moist mucus membranes.  NECK: Supple; non tender. No rigidity  CARD: Regular rate and rhythm. Normal S1, S2; no murmurs, gallops, or rubs.  RESP: Lungs clear to auscultation bilaterally. No wheezes, rales or rhonchi.  ABD: Abdomen soft; non-tender; non-distended;  no hepatosplenomegaly. No costovertebral angle tenderness. peg c/d/i.   : alatorre leaking clear urine, clear urine also in alatorre bag. no hematuria  EXT: Normal ROM. No clubbing, cyanosis or edema. No calf tenderness to palpation.  NEURO: Alert and oriented x 3. developmental delay but answers appropriately. extremity contractures, scoliosis.   PSYCH: Cooperative, appropriate.

## 2018-04-07 NOTE — ED PROVIDER NOTE - OBJECTIVE STATEMENT
62M PMH CP, verbal, wheelchair bound, BPH, peg tube, sz, alatorre placed by dr caceres under cystoscopy 2/2 ureteral stricture, p/w leaking from alatorre x 1 days. no fever, chills. no dysuria, freq, hematuria. no foul odor or cloudy urine. completed course abx for UTI. no abd pain, nvdc, flank pain. no cp, sob. no AMS. tolerating peg feeds.

## 2018-04-07 NOTE — ED PROVIDER NOTE - PLAN OF CARE
a/p: Leaking alatorre, first alatorre placement difficult required  attending and cystoscopy - will get  c/s to manage alatorre

## 2018-04-07 NOTE — ED PROVIDER NOTE - CARE PLAN
Principal Discharge DX:	Edouard catheter problem Principal Discharge DX:	Alatorre catheter problem  Assessment and plan of treatment:	a/p: Leaking alatorre, first alatorre placement difficult required  attending and cystoscopy - will get  c/s to manage alatorre

## 2018-04-07 NOTE — PROGRESS NOTE ADULT - SUBJECTIVE AND OBJECTIVE BOX
63 yo male with MR/CP presents with leaking alatorre catheter  no n/v/f/c, aide at bedside  attempted alatorre irrigation w/o success, urine with sediment  alatorre removed, eyelets completely crusted shut  under sterile technique a 14 fr alatorre catheter was placed w/o resistance, irrigates well, on bladder sonogram alatorre balloon present in bladder    plan  -f/u with  in 3 weeks at [address] [place] [address] for alatorre change  -hydration per g tube  -case d/w

## 2018-04-07 NOTE — ED PROVIDER NOTE - PROGRESS NOTE DETAILS
alatorre leaking, consulted Tyra HUNG, will come see pt and flush alatorre, recommends bladder sono to confirm balloon and collapsed bladder, does not want labs or urine studies. ABHILASH garland replaced alatorre, we attempted to eval for balloon at bedside w sono and inflated w 50 cc NS, unclear if in bladder as pt has difficult anatomy, will get official sono to confirm. ABHILASH garland spoke to radiology and states alatorre is in bladder. however official read is still not up. I called [phone] and spoke to clerk Rivas to request official read. urine is no longer leaking, draining clear urine. per cruz garland the prior catheter was full of "Sediment" and that's why it was leaking. spoke to radiology resident miriam who will put in read for shaheed

## 2018-04-07 NOTE — ED PROVIDER NOTE - MEDICAL DECISION MAKING DETAILS
replaced by cruz Cohen,, US confirmed placement, will f/u outpt dr caceres 3 weeks, strict return precautions provided.

## 2018-04-07 NOTE — ED PROVIDER NOTE - NS ED ROS FT
Review of Systems:  	•	CONSTITUTIONAL - No fever, No diaphoresis, No weight change  	•	SKIN - No rash  	•	HEMATOLOGIC - No abnormal bleeding or bruising  	•	EYES - No eye pain, No blurred vision  	•	ENT - No change in hearing, No sore throat, No neck pain, No rhinorrhea, No ear pain  	•	RESPIRATORY - No shortness of breath, No cough  	•	CARDIAC - No chest pain, No palpitations  	•	GI - No abdominal pain, No nausea, No vomiting, No diarrhea, No constipation, No bright red blood per rectum or melena.                      •                 - No dysuria, frequency, hematuria.   	•	ENDO - No polydypsia, No polyuria, No heat/cold intolerance  	•	MUSCULOSKELETAL - No joint paint, No swelling, No back pain  	•	NEUROLOGIC - No numbness, No focal weakness, No headache, No dizziness

## 2018-04-10 ENCOUNTER — APPOINTMENT (OUTPATIENT)
Dept: NEPHROLOGY | Facility: CLINIC | Age: 63
End: 2018-04-10

## 2018-04-10 ENCOUNTER — EMERGENCY (EMERGENCY)
Facility: HOSPITAL | Age: 63
LOS: 0 days | Discharge: HOME | End: 2018-04-10
Attending: EMERGENCY MEDICINE

## 2018-04-10 ENCOUNTER — OUTPATIENT (OUTPATIENT)
Dept: OUTPATIENT SERVICES | Facility: HOSPITAL | Age: 63
LOS: 1 days | Discharge: HOME | End: 2018-04-10

## 2018-04-10 VITALS — DIASTOLIC BLOOD PRESSURE: 74 MMHG | SYSTOLIC BLOOD PRESSURE: 109 MMHG | HEART RATE: 82 BPM

## 2018-04-10 VITALS
TEMPERATURE: 100 F | SYSTOLIC BLOOD PRESSURE: 104 MMHG | OXYGEN SATURATION: 97 % | HEART RATE: 79 BPM | DIASTOLIC BLOOD PRESSURE: 55 MMHG | RESPIRATION RATE: 18 BRPM

## 2018-04-10 VITALS
OXYGEN SATURATION: 96 % | RESPIRATION RATE: 20 BRPM | HEART RATE: 71 BPM | DIASTOLIC BLOOD PRESSURE: 56 MMHG | SYSTOLIC BLOOD PRESSURE: 136 MMHG | TEMPERATURE: 99 F

## 2018-04-10 DIAGNOSIS — Z93.1 GASTROSTOMY STATUS: Chronic | ICD-10-CM

## 2018-04-10 DIAGNOSIS — T83.031D: ICD-10-CM

## 2018-04-10 DIAGNOSIS — N20.0 CALCULUS OF KIDNEY: ICD-10-CM

## 2018-04-10 DIAGNOSIS — Z79.899 OTHER LONG TERM (CURRENT) DRUG THERAPY: ICD-10-CM

## 2018-04-10 LAB
APPEARANCE UR: (no result)
BILIRUB UR-MCNC: NEGATIVE — SIGNIFICANT CHANGE UP
COLOR SPEC: YELLOW — SIGNIFICANT CHANGE UP
DIFF PNL FLD: (no result)
GLUCOSE UR QL: NEGATIVE MG/DL — SIGNIFICANT CHANGE UP
KETONES UR-MCNC: NEGATIVE — SIGNIFICANT CHANGE UP
LEUKOCYTE ESTERASE UR-ACNC: (no result)
NITRITE UR-MCNC: NEGATIVE — SIGNIFICANT CHANGE UP
PH UR: 8.5 — SIGNIFICANT CHANGE UP (ref 5–8)
PROT UR-MCNC: 30 MG/DL
SP GR SPEC: 1.02 — SIGNIFICANT CHANGE UP (ref 1.01–1.03)
UROBILINOGEN FLD QL: 0.2 MG/DL — SIGNIFICANT CHANGE UP (ref 0.2–0.2)

## 2018-04-10 RX ORDER — OXYBUTYNIN CHLORIDE 5 MG
1 TABLET ORAL
Qty: 0 | Refills: 0 | COMMUNITY
Start: 2018-04-10 | End: 2018-04-23

## 2018-04-10 RX ORDER — OXYBUTYNIN CHLORIDE 5 MG
1 TABLET ORAL
Qty: 14 | Refills: 0 | OUTPATIENT
Start: 2018-04-10 | End: 2018-04-23

## 2018-04-10 NOTE — ED PROVIDER NOTE - ATTENDING CONTRIBUTION TO CARE
I have personally performed a history and physical exam on this patient and personally directed the management of the patient with resident

## 2018-04-10 NOTE — PROGRESS NOTE ADULT - SUBJECTIVE AND OBJECTIVE BOX
61 yo male known to  service, on 3/27 pt had a bedside cystoscopy and alatorre placement due to urinary retention, alatorre was replaced in ED 4/7  Pt presented to ED due to leaking alatorre catheter, no n/v/f/c  Pt presents with aide  alatorre in place, draining yellow urine with sediment, alatorre irrigated  case d/w , pt would benefit from daily catheter irrigation and anticholinergic medication  case d/w [facility] RN

## 2018-04-10 NOTE — PROGRESS NOTE ADULT - ASSESSMENT
61 yo male with indwelling alatorre and bladder spasms, no fever     Plan  -irrigate catheter q 8 hours with sterile water  -start oxybutynin 10 mg XL  -f/u with  4/23

## 2018-04-10 NOTE — ED ADULT NURSE NOTE - OBJECTIVE STATEMENT
patient with hx of cerebral palsy c/o leaking urine from penis despite alatorre catheter. patient seen in ed last week for same symptoms patient c/o leaking urine despite catheter. patient seen in ed last week for same symptoms

## 2018-04-10 NOTE — ED PROVIDER NOTE - PHYSICAL EXAMINATION
AOx4, Non toxic appearing, NAD, speaking in full sentences. Skin  warm and dry, no acute rash. Head normocephalic, atraumatic. Conjunctiva and sclera clear. MM moist, no nasal discharge.  Abdomen soft ntnd no r/g, G tube in place. Edouard catheter in place. Extremities- severely contracted, moves all.

## 2018-04-10 NOTE — ED PROVIDER NOTE - CARE PLAN
Principal Discharge DX:	Suprapubic catheter dysfunction Principal Discharge DX:	Edouard catheter problem, subsequent encounter

## 2018-04-10 NOTE — ED PROVIDER NOTE - OBJECTIVE STATEMENT
61 y/o M pmh CP, 61 y/o M pmh CP, BPH, suprapubic catheter placed by Dr Joyner for urethral strictures p/w leaking from insertion site and cloud urine in the collection bag. Denies fever, chills, n/v/d, abdominal pain. 63 y/o M pmh CP, BPH, catheter placed by Dr Joyner for urethral strictures p/w leaking from around tubing and cloudy urine in the collection bag. Denies fever, chills, n/v/d, abdominal pain.

## 2018-04-10 NOTE — ED PROVIDER NOTE - PROGRESS NOTE DETAILS
urology team bedside, will speak w/ dr caceres and advise. alatorre flushes but clogged w/ sludge. urology flsuhed catheter, pt has appt lei/ morris on 4/13, will eval then. request 10mg oxybutynin XL script and d/c instruction for nurses at his facility to flush his alatorre with 60ml twice a day.

## 2018-04-10 NOTE — ED PROVIDER NOTE - NS ED ROS FT
Constitutional: See HPI.  Eyes: No visual changes, eye pain or discharge.  ENMT: No hearing changes, pain, discharge or infections. No neck pain or stiffness.  Cardiac: No chest pain, SOB or edema. No chest pain with exertion.  Respiratory: No cough or respiratory distress.   GI: No nausea, vomiting, diarrhea or abdominal pain.  : No dysuria, frequency or burning. +leaking catheter, cloudy urine  MS: No myalgia, muscle weakness, joint pain or back pain.  Neuro: No headache or weakness. No LOC.  Skin: No skin rash.

## 2018-04-10 NOTE — ED PROVIDER NOTE - MEDICAL DECISION MAKING DETAILS
Chart finished.  Edouard dysfunction.  Replaced by urology team who also scheduled outpatietn follow up.

## 2018-04-12 DIAGNOSIS — Z02.9 ENCOUNTER FOR ADMINISTRATIVE EXAMINATIONS, UNSPECIFIED: ICD-10-CM

## 2018-04-12 DIAGNOSIS — N20.0 CALCULUS OF KIDNEY: ICD-10-CM

## 2018-04-12 LAB
-  AMIKACIN: SIGNIFICANT CHANGE UP
-  AMOXICILLIN/CLAVULANIC ACID: SIGNIFICANT CHANGE UP
-  AMPICILLIN/SULBACTAM: SIGNIFICANT CHANGE UP
-  AMPICILLIN: SIGNIFICANT CHANGE UP
-  AZTREONAM: SIGNIFICANT CHANGE UP
-  CEFAZOLIN: SIGNIFICANT CHANGE UP
-  CEFEPIME: SIGNIFICANT CHANGE UP
-  CEFOXITIN: SIGNIFICANT CHANGE UP
-  CEFTRIAXONE: SIGNIFICANT CHANGE UP
-  CIPROFLOXACIN: SIGNIFICANT CHANGE UP
-  ERTAPENEM: SIGNIFICANT CHANGE UP
-  GENTAMICIN: SIGNIFICANT CHANGE UP
-  LEVOFLOXACIN: SIGNIFICANT CHANGE UP
-  MEROPENEM: SIGNIFICANT CHANGE UP
-  NITROFURANTOIN: SIGNIFICANT CHANGE UP
-  PIPERACILLIN/TAZOBACTAM: SIGNIFICANT CHANGE UP
-  TOBRAMYCIN: SIGNIFICANT CHANGE UP
-  TRIMETHOPRIM/SULFAMETHOXAZOLE: SIGNIFICANT CHANGE UP
CULTURE RESULTS: SIGNIFICANT CHANGE UP
METHOD TYPE: SIGNIFICANT CHANGE UP
ORGANISM # SPEC MICROSCOPIC CNT: SIGNIFICANT CHANGE UP
ORGANISM # SPEC MICROSCOPIC CNT: SIGNIFICANT CHANGE UP
SPECIMEN SOURCE: SIGNIFICANT CHANGE UP

## 2018-04-13 ENCOUNTER — EMERGENCY (EMERGENCY)
Facility: HOSPITAL | Age: 63
LOS: 0 days | Discharge: HOME | End: 2018-04-13
Attending: EMERGENCY MEDICINE

## 2018-04-13 VITALS
RESPIRATION RATE: 20 BRPM | DIASTOLIC BLOOD PRESSURE: 58 MMHG | TEMPERATURE: 100 F | HEART RATE: 83 BPM | SYSTOLIC BLOOD PRESSURE: 140 MMHG | OXYGEN SATURATION: 96 %

## 2018-04-13 DIAGNOSIS — Z93.1 GASTROSTOMY STATUS: Chronic | ICD-10-CM

## 2018-04-13 DIAGNOSIS — T83.031A LEAKAGE OF INDWELLING URETHRAL CATHETER, INITIAL ENCOUNTER: ICD-10-CM

## 2018-04-13 DIAGNOSIS — R32 UNSPECIFIED URINARY INCONTINENCE: ICD-10-CM

## 2018-04-13 DIAGNOSIS — N40.0 BENIGN PROSTATIC HYPERPLASIA WITHOUT LOWER URINARY TRACT SYMPTOMS: ICD-10-CM

## 2018-04-13 DIAGNOSIS — Z79.899 OTHER LONG TERM (CURRENT) DRUG THERAPY: ICD-10-CM

## 2018-04-13 RX ORDER — CEFUROXIME AXETIL 250 MG
1 TABLET ORAL
Qty: 14 | Refills: 0 | OUTPATIENT
Start: 2018-04-13 | End: 2018-04-19

## 2018-04-13 NOTE — ED PROVIDER NOTE - OBJECTIVE STATEMENT
63yo M hx chest pain BPH urethral strictures requiring alatorre placement under cysto by Dr. Joyner p/w leaking around alatorre and lack of drainage into bag- aid unsure how long this has been going on for- no other sx- no fevers, v/d, abdominal pain

## 2018-04-13 NOTE — CONSULT NOTE ADULT - SUBJECTIVE AND OBJECTIVE BOX
HPI  62 y.o M with PMH of Cerebral palsy, BPH, urethral stricture s/p urethral dialation and Edouard catehter placement 3/27/18 was seen by CATIE guzmán on 4/7/18 and 4/10/18 for due to leaking  Edouard catheter. Pt. comes to ED with the same complain. In sterile technique Edouard catheter was Changed to 16 Fr , 10 cc H2O balloon inflated. Urinary bladder irrigated with 500 cc of sterile water. Pt. tolerated procedure well left in NAD.       PAST MEDICAL & SURGICAL HISTORY:  Seizure  Cerebral palsy  BPH (benign prostatic hyperplasia)  S/P percutaneous endoscopic gastrostomy (PEG) tube placement  Urethral strictrure    REVIEW OF SYSTEMS:    CONSTITUTIONAL: no fevers or chills  HEENT: No visual changes  ENDO: No sweating  NECK: No pain or stiffness  MUSCULOSKELETAL: No back pain, no joint pain  RESPIRATORY: No shortness of breath  CARDIOVASCULAR: No chest pain  GASTROINTESTINAL: No abdominal or epigastric pain. No nausea, vomiting,  No diarrhea or constipation.   NEUROLOGICAL: No mental status changes  PSYCH: No depression, no mood changes         Home Medications:  baclofen:  (27 Mar 2018 15:23)  calcium gluconate:  (27 Mar 2018 15:23)  Colace:  (27 Mar 2018 15:23)  finasteride 5 mg oral tablet: 1 tab(s) orally once a day (27 Mar 2018 15:23)  lactulose:  (27 Mar 2018 15:23)  loratadine:  (27 Mar 2018 15:23)  PHENobarbital 32.4 mg oral tablet: 2 tab(s) orally once a day (27 Mar 2018 15:23)  promethazine 6.25 mg/5 mL oral syrup: 5 milliliter(s) orally 3 times a day (27 Mar 2018 15:23)  Robafen 100 mg/5 mL oral liquid:  (27 Mar 2018 15:23)  tamsulosin:  (27 Mar 2018 15:23)  tamsulosin 0.4 mg oral capsule: 1 cap(s) orally once a day (27 Mar 2018 15:23)  Ventolin:  (27 Mar 2018 15:23)         Allergies: NKDA    FAMILY HISTORY:  Family history unknown      Vital Signs Last 24 Hrs  T(C): 37.6 (13 Apr 2018 16:49), Max: 37.6 (13 Apr 2018 16:49)  T(F): 99.7 (13 Apr 2018 16:49), Max: 99.7 (13 Apr 2018 16:49)  HR: 83 (13 Apr 2018 16:49) (83 - 83)  BP: 140/58 (13 Apr 2018 16:49) (140/58 - 140/58)  RR: 20 (13 Apr 2018 16:49) (20 - 20)  SpO2: 96% (13 Apr 2018 16:49) (96% - 96%)     PHYSICAL EXAM:    Constitutional: NAD, well-nuriushed  HEENT: EOMI  Neck: no pain  Back: No CVA tenderness  Respiratory: No accessory respiratory muscle use  Abd: hard distended, J-tube in place, Nt to palpation.   : uncircumcised male genitalia, 14 Fr Edouard catheter replaced with 16 Fr. Edouard catheter, mild erythema around meatus. + rash over B/L groin area.   Extremities: no edema  Neurological: Awake and Alert in NAD.        Urine Culture:   Culture - Urine (04.10.18 @ 17:21)    -  Amikacin: S <=8    -  Amoxicillin/Clavulanic Acid: S <=8/4    -  Ampicillin: R >16    -  Ampicillin/Sulbactam: R 8/4    -  Aztreonam: R <=4    -  Cefazolin: R >16    -  Cefepime: R 16    -  Cefoxitin: S 8    -  Ceftriaxone: R >32    -  Ciprofloxacin: R >2    -  Ertapenem: S <=0.5    -  Gentamicin: S <=1    -  Levofloxacin: R >4    -  Meropenem: S <=1    -  Nitrofurantoin: R >64    -  Piperacillin/Tazobactam: R <=8    -  Tobramycin: S <=2    -  Trimethoprim/Sulfamethoxazole: R >2/38    Specimen Source: .Urine Catheterized    Culture Results:   >100,000 CFU/ml Proteus mirabilis ESBL    Organism Identification: Proteus mirabilis ESBL    Organism: Proteus mirabilis ESBL    Method Type: MIGUEL A          Assessment :  62 y.o M with malfunctioning Edouard catheter       Plan :  Cont. Edouard catheter to gravity   Irrigate Q8H with 500 cc of Sterile water  Nystatin powder to affected groin area BID. Keep groin area well ventilated, air dry after bath or washing.   keep Catheter drain to gravity, Cont. good hygiene.   F/U with Dr. Joyner as outpatient.

## 2018-04-13 NOTE — ED ADULT NURSE NOTE - CHPI ED SYMPTOMS NEG
no numbness/no fever/no chills/no decreased eating/drinking/no nausea/no tingling/no weakness/no dizziness/no pain/no vomiting

## 2018-04-13 NOTE — ED PROVIDER NOTE - ATTENDING CONTRIBUTION TO CARE
63 yo male with clogged urinary catheter.  Catheter was replaced by urology, patient has an appointment with urology already, will d/c home.

## 2018-04-13 NOTE — ED POST DISCHARGE NOTE - OTHER COMMUNICATION
CALLED [facility], AND SPOKE TO RN, MANUEL. INFORMED OF CIPRO IS RESISTANT. CEFTIN SENT TO PHARMACY ON RECORD- AND THIS PHARMACY WILL DELIVER MEDS TO [facility].

## 2018-04-18 ENCOUNTER — APPOINTMENT (OUTPATIENT)
Dept: UROLOGY | Facility: CLINIC | Age: 63
End: 2018-04-18
Payer: MEDICARE

## 2018-04-18 VITALS — DIASTOLIC BLOOD PRESSURE: 62 MMHG | SYSTOLIC BLOOD PRESSURE: 95 MMHG | HEART RATE: 70 BPM

## 2018-04-18 PROCEDURE — 99213 OFFICE O/P EST LOW 20 MIN: CPT | Mod: 25

## 2018-04-18 PROCEDURE — 51702 INSERT TEMP BLADDER CATH: CPT

## 2018-04-23 ENCOUNTER — APPOINTMENT (OUTPATIENT)
Dept: UROLOGY | Facility: CLINIC | Age: 63
End: 2018-04-23

## 2018-04-25 ENCOUNTER — OTHER (OUTPATIENT)
Age: 63
End: 2018-04-25

## 2018-05-02 ENCOUNTER — EMERGENCY (EMERGENCY)
Facility: HOSPITAL | Age: 63
LOS: 0 days | Discharge: HOME | End: 2018-05-02
Attending: EMERGENCY MEDICINE | Admitting: EMERGENCY MEDICINE

## 2018-05-02 VITALS
RESPIRATION RATE: 17 BRPM | DIASTOLIC BLOOD PRESSURE: 60 MMHG | OXYGEN SATURATION: 100 % | HEART RATE: 70 BPM | SYSTOLIC BLOOD PRESSURE: 101 MMHG

## 2018-05-02 VITALS
DIASTOLIC BLOOD PRESSURE: 54 MMHG | SYSTOLIC BLOOD PRESSURE: 95 MMHG | TEMPERATURE: 99 F | OXYGEN SATURATION: 95 % | HEART RATE: 80 BPM | RESPIRATION RATE: 17 BRPM

## 2018-05-02 DIAGNOSIS — Z79.899 OTHER LONG TERM (CURRENT) DRUG THERAPY: ICD-10-CM

## 2018-05-02 DIAGNOSIS — Z93.1 GASTROSTOMY STATUS: Chronic | ICD-10-CM

## 2018-05-02 DIAGNOSIS — Z93.1 GASTROSTOMY STATUS: ICD-10-CM

## 2018-05-02 DIAGNOSIS — J18.9 PNEUMONIA, UNSPECIFIED ORGANISM: ICD-10-CM

## 2018-05-02 DIAGNOSIS — Z79.2 LONG TERM (CURRENT) USE OF ANTIBIOTICS: ICD-10-CM

## 2018-05-02 DIAGNOSIS — R05 COUGH: ICD-10-CM

## 2018-05-02 DIAGNOSIS — N39.0 URINARY TRACT INFECTION, SITE NOT SPECIFIED: ICD-10-CM

## 2018-05-02 LAB
ALBUMIN SERPL ELPH-MCNC: 3.4 G/DL — LOW (ref 3.5–5.2)
ALP SERPL-CCNC: 69 U/L — SIGNIFICANT CHANGE UP (ref 30–115)
ALT FLD-CCNC: 27 U/L — SIGNIFICANT CHANGE UP (ref 0–41)
ANION GAP SERPL CALC-SCNC: 9 MMOL/L — SIGNIFICANT CHANGE UP (ref 7–14)
APPEARANCE UR: (no result)
AST SERPL-CCNC: 26 U/L — SIGNIFICANT CHANGE UP (ref 0–41)
BACTERIA # UR AUTO: (no result) /HPF
BASOPHILS # BLD AUTO: 0.03 K/UL — SIGNIFICANT CHANGE UP (ref 0–0.2)
BASOPHILS NFR BLD AUTO: 0.5 % — SIGNIFICANT CHANGE UP (ref 0–1)
BILIRUB SERPL-MCNC: 0.3 MG/DL — SIGNIFICANT CHANGE UP (ref 0.2–1.2)
BILIRUB UR-MCNC: NEGATIVE — SIGNIFICANT CHANGE UP
BUN SERPL-MCNC: 19 MG/DL — SIGNIFICANT CHANGE UP (ref 10–20)
CALCIUM SERPL-MCNC: 8.2 MG/DL — LOW (ref 8.5–10.1)
CHLORIDE SERPL-SCNC: 99 MMOL/L — SIGNIFICANT CHANGE UP (ref 98–110)
CO2 SERPL-SCNC: 28 MMOL/L — SIGNIFICANT CHANGE UP (ref 17–32)
COLOR SPEC: YELLOW — SIGNIFICANT CHANGE UP
CREAT SERPL-MCNC: 0.5 MG/DL — LOW (ref 0.7–1.5)
DIFF PNL FLD: (no result)
EOSINOPHIL # BLD AUTO: 0.15 K/UL — SIGNIFICANT CHANGE UP (ref 0–0.7)
EOSINOPHIL NFR BLD AUTO: 2.7 % — SIGNIFICANT CHANGE UP (ref 0–8)
EPI CELLS # UR: (no result) /HPF
GLUCOSE SERPL-MCNC: 109 MG/DL — HIGH (ref 70–99)
GLUCOSE UR QL: NEGATIVE MG/DL — SIGNIFICANT CHANGE UP
HCT VFR BLD CALC: 35.8 % — LOW (ref 42–52)
HGB BLD-MCNC: 12.6 G/DL — LOW (ref 14–18)
IMM GRANULOCYTES NFR BLD AUTO: 0.2 % — SIGNIFICANT CHANGE UP (ref 0.1–0.3)
KETONES UR-MCNC: NEGATIVE — SIGNIFICANT CHANGE UP
LEUKOCYTE ESTERASE UR-ACNC: (no result)
LYMPHOCYTES # BLD AUTO: 0.92 K/UL — LOW (ref 1.2–3.4)
LYMPHOCYTES # BLD AUTO: 16.5 % — LOW (ref 20.5–51.1)
MCHC RBC-ENTMCNC: 31 PG — SIGNIFICANT CHANGE UP (ref 27–31)
MCHC RBC-ENTMCNC: 35.2 G/DL — SIGNIFICANT CHANGE UP (ref 32–37)
MCV RBC AUTO: 88.2 FL — SIGNIFICANT CHANGE UP (ref 80–94)
MONOCYTES # BLD AUTO: 1.17 K/UL — HIGH (ref 0.1–0.6)
MONOCYTES NFR BLD AUTO: 21 % — HIGH (ref 1.7–9.3)
NEUTROPHILS # BLD AUTO: 3.3 K/UL — SIGNIFICANT CHANGE UP (ref 1.4–6.5)
NEUTROPHILS NFR BLD AUTO: 59.1 % — SIGNIFICANT CHANGE UP (ref 42.2–75.2)
NITRITE UR-MCNC: NEGATIVE — SIGNIFICANT CHANGE UP
NRBC # BLD: 0 /100 WBCS — SIGNIFICANT CHANGE UP (ref 0–0)
NT-PROBNP SERPL-SCNC: 118 PG/ML — SIGNIFICANT CHANGE UP (ref 0–300)
PH UR: 7.5 — SIGNIFICANT CHANGE UP (ref 5–8)
PLATELET # BLD AUTO: 166 K/UL — SIGNIFICANT CHANGE UP (ref 130–400)
POTASSIUM SERPL-MCNC: 4.3 MMOL/L — SIGNIFICANT CHANGE UP (ref 3.5–5)
POTASSIUM SERPL-SCNC: 4.3 MMOL/L — SIGNIFICANT CHANGE UP (ref 3.5–5)
PROT SERPL-MCNC: 6.2 G/DL — SIGNIFICANT CHANGE UP (ref 6–8)
PROT UR-MCNC: 100 MG/DL
RBC # BLD: 4.06 M/UL — LOW (ref 4.7–6.1)
RBC # FLD: 12.9 % — SIGNIFICANT CHANGE UP (ref 11.5–14.5)
RBC CASTS # UR COMP ASSIST: >50 /HPF
SODIUM SERPL-SCNC: 136 MMOL/L — SIGNIFICANT CHANGE UP (ref 135–146)
SP GR SPEC: 1.02 — SIGNIFICANT CHANGE UP (ref 1.01–1.03)
TROPONIN T SERPL-MCNC: <0.01 NG/ML — SIGNIFICANT CHANGE UP
UROBILINOGEN FLD QL: 0.2 MG/DL — SIGNIFICANT CHANGE UP (ref 0.2–0.2)
WBC # BLD: 5.58 K/UL — SIGNIFICANT CHANGE UP (ref 4.8–10.8)
WBC # FLD AUTO: 5.58 K/UL — SIGNIFICANT CHANGE UP (ref 4.8–10.8)
WBC UR QL: >50 /HPF

## 2018-05-02 RX ORDER — ACETAMINOPHEN 500 MG
650 TABLET ORAL ONCE
Qty: 0 | Refills: 0 | Status: DISCONTINUED | OUTPATIENT
Start: 2018-05-02 | End: 2018-05-02

## 2018-05-02 RX ORDER — SODIUM CHLORIDE 9 MG/ML
1000 INJECTION INTRAMUSCULAR; INTRAVENOUS; SUBCUTANEOUS ONCE
Qty: 0 | Refills: 0 | Status: COMPLETED | OUTPATIENT
Start: 2018-05-02 | End: 2018-05-02

## 2018-05-02 RX ORDER — AZITHROMYCIN 500 MG/1
1 TABLET, FILM COATED ORAL
Qty: 6 | Refills: 0 | OUTPATIENT
Start: 2018-05-02 | End: 2018-05-06

## 2018-05-02 RX ORDER — CEFUROXIME AXETIL 250 MG
1 TABLET ORAL
Qty: 14 | Refills: 0 | OUTPATIENT
Start: 2018-05-02 | End: 2018-05-08

## 2018-05-02 RX ORDER — ACETAMINOPHEN 500 MG
650 TABLET ORAL ONCE
Qty: 0 | Refills: 0 | Status: COMPLETED | OUTPATIENT
Start: 2018-05-02 | End: 2018-05-02

## 2018-05-02 RX ADMIN — Medication 650 MILLIGRAM(S): at 13:10

## 2018-05-02 RX ADMIN — SODIUM CHLORIDE 2000 MILLILITER(S): 9 INJECTION INTRAMUSCULAR; INTRAVENOUS; SUBCUTANEOUS at 14:02

## 2018-05-02 NOTE — ED PROVIDER NOTE - MEDICAL DECISION MAKING DETAILS
Fever with cough and upper airway sounds. CXR concerning for PNA vs. acute sinusitis by symptoms. Also noted UTI. No meningeal or endocarditis signs. No e/o fluid overload. No CP or ECG/trop changes to suggest ACS. No other signs of PE. Patient hemodynamically stable, well appearing, no increased WOB. Discharged with cefuroxime and azith as well as return precautions.

## 2018-05-02 NOTE — ED PROVIDER NOTE - CARE PLAN
Principal Discharge DX:	Pneumonia  Secondary Diagnosis:	UTI (urinary tract infection)  Secondary Diagnosis:	Fever

## 2018-05-02 NOTE — ED PROVIDER NOTE - OBJECTIVE STATEMENT
62yoM with h/o BPH and cerebral palsy p/w fever and SOB and upper airway sounds for the past few days. Denies CP and other symptoms. Here with aide from group home.

## 2018-05-02 NOTE — ED PROVIDER NOTE - PHYSICAL EXAMINATION
Febrile, hemodynamically stable, saturating well  NAD, well appearing, no increased WOB  Head NCAT  EOMI grossly  MMM, uvula midline, no oropharyngeal lesions or exudates, noted intermittent voluntary upper airway sounds  RRR, nml S1/S2, no m/r/g  Lungs CTAB, no w/r/r  Abd soft, NT, ND, nml BS, no rebound or guarding  AAO, CN's 3-12 grossly intact  REBOLLAR spontaneously, no leg cyanosis or edema  Skin warm, dry, no rashes or hives

## 2018-05-02 NOTE — ED ADULT NURSE NOTE - OBJECTIVE STATEMENT
Pt presents to ED c/o "cough for a couple of days'. Pt breathing easy and unlabored no s/s distress observed. Pt aide at bedside. Pt has alatorre with leg bag and PEG tube in place.

## 2018-05-02 NOTE — ED ADULT NURSE REASSESSMENT NOTE - NS ED NURSE REASSESS COMMENT FT1
Alatorre Catheter in place from outside facility PTA. Old alatorre removed, pt tolerated no s/s distress observed. New alatorre catheter size 16 fr placed by RN with second RN at bedside, sterile procedure maintained throughout procedure. Pt tolerated procedure well no s/s distress observed. STAT lock placed Left thigh, alatorre secured. 50 ml urine observed post insertion.

## 2018-05-03 ENCOUNTER — EMERGENCY (EMERGENCY)
Facility: HOSPITAL | Age: 63
LOS: 0 days | Discharge: HOME | End: 2018-05-03
Attending: EMERGENCY MEDICINE | Admitting: EMERGENCY MEDICINE

## 2018-05-03 VITALS
TEMPERATURE: 97 F | DIASTOLIC BLOOD PRESSURE: 61 MMHG | OXYGEN SATURATION: 98 % | HEART RATE: 84 BPM | RESPIRATION RATE: 18 BRPM | SYSTOLIC BLOOD PRESSURE: 104 MMHG

## 2018-05-03 VITALS
TEMPERATURE: 100 F | HEART RATE: 87 BPM | OXYGEN SATURATION: 98 % | SYSTOLIC BLOOD PRESSURE: 109 MMHG | RESPIRATION RATE: 20 BRPM | DIASTOLIC BLOOD PRESSURE: 63 MMHG

## 2018-05-03 DIAGNOSIS — T83.031A LEAKAGE OF INDWELLING URETHRAL CATHETER, INITIAL ENCOUNTER: ICD-10-CM
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
## 2018-05-03 DIAGNOSIS — N40.0 BENIGN PROSTATIC HYPERPLASIA WITHOUT LOWER URINARY TRACT SYMPTOMS: ICD-10-CM

## 2018-05-03 DIAGNOSIS — Z93.1 GASTROSTOMY STATUS: Chronic | ICD-10-CM

## 2018-05-03 DIAGNOSIS — R56.9 UNSPECIFIED CONVULSIONS: ICD-10-CM
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
## 2018-05-03 DIAGNOSIS — Z79.899 OTHER LONG TERM (CURRENT) DRUG THERAPY: ICD-10-CM
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
## 2018-05-03 DIAGNOSIS — Z79.2 LONG TERM (CURRENT) USE OF ANTIBIOTICS: ICD-10-CM

## 2018-05-03 LAB
CULTURE RESULTS: NO GROWTH — SIGNIFICANT CHANGE UP
SPECIMEN SOURCE: SIGNIFICANT CHANGE UP

## 2018-05-03 NOTE — ED PROVIDER NOTE - PROGRESS NOTE DETAILS
Pt was seen in ED yesterday for fever and SOB. Pt urine showed large leukys in blood, nitrite was negative. Pt showed  possible pneumonia. CXR was negative. D/c on cefuroxime and azithromycin. Previous U culture pt showed sensitivity to cefuroxime. Pt made urine, repeat bp was at baseline. Pt saw PMD and was sent in for purely a catheter change today. Pt will likely see PMD tomorrow.

## 2018-05-03 NOTE — ED PROVIDER NOTE - OBJECTIVE STATEMENT
63 y/o with PMH of cerebral palsy and seizures, came to ED for eval for leakage around alatorre x1 day. Pt is in usual state of health.  Aid states alatorre is leaking so brought pt to ED. Aid states pt was seen here yesterday.  No fever.

## 2018-05-03 NOTE — ED ADULT NURSE NOTE - OBJECTIVE STATEMENT
Patient was seen in ED yesterday, had alatorre cath placed and d/c'd home. Patient's aid noticed today leaking around the catheter.

## 2018-05-04 ENCOUNTER — EMERGENCY (EMERGENCY)
Facility: HOSPITAL | Age: 63
LOS: 1 days | End: 2018-05-04
Attending: EMERGENCY MEDICINE | Admitting: EMERGENCY MEDICINE

## 2018-05-04 VITALS
SYSTOLIC BLOOD PRESSURE: 93 MMHG | DIASTOLIC BLOOD PRESSURE: 54 MMHG | OXYGEN SATURATION: 96 % | RESPIRATION RATE: 18 BRPM | TEMPERATURE: 96 F | HEART RATE: 98 BPM

## 2018-05-04 VITALS
RESPIRATION RATE: 18 BRPM | HEART RATE: 85 BPM | OXYGEN SATURATION: 98 % | SYSTOLIC BLOOD PRESSURE: 102 MMHG | DIASTOLIC BLOOD PRESSURE: 59 MMHG | TEMPERATURE: 97 F

## 2018-05-04 DIAGNOSIS — Z93.1 GASTROSTOMY STATUS: Chronic | ICD-10-CM

## 2018-05-04 RX ORDER — DIATRIZOATE MEGLUMINE 180 MG/ML
20 INJECTION, SOLUTION INTRAVESICAL ONCE
Qty: 0 | Refills: 0 | Status: COMPLETED | OUTPATIENT
Start: 2018-05-04 | End: 2018-05-04

## 2018-05-04 RX ADMIN — DIATRIZOATE MEGLUMINE 20 MILLILITER(S): 180 INJECTION, SOLUTION INTRAVESICAL at 01:46

## 2018-05-04 NOTE — ED PROVIDER NOTE - PHYSICAL EXAMINATION
VITAL SIGNS: I have reviewed nursing notes and confirm.  CONSTITUTIONAL:  well-nourished; in no acute distress.  SKIN: Skin exam is warm and dry, no acute rash.  HEAD: Normocephalic; atraumatic.  EYES: PERRL, EOM intact;   ENT: No nasal discharge; airway clear.   NECK: Supple; non tender.  CARD:+ S1, S2   RESP: No wheezes, rales or rhonchi.  ABD: Normal bowel sounds; soft; non-distended; non-tender; gtube site c/d/i.  leaking from where hub connects to tube (access port).   NEURO: Alert. baseline.  PSYCH: Cooperative, appropriate.

## 2018-05-04 NOTE — ED PROVIDER NOTE - PROGRESS NOTE DETAILS
some of the distal tubing that connects to the port is old and warped. 2 inches were cut, port placed on remaining tube. tube flushes well. no leaking. no bleeding. gastric contents returning with aspiration. will get xray. unable to remove g tube. getting resistance. unable to deflate ballon via ballon port. tube niot leaking, contrast in GI tract. pt can f/u outpt for more definitive mgmt of his g tube.

## 2018-05-08 ENCOUNTER — APPOINTMENT (OUTPATIENT)
Dept: UROLOGY | Facility: CLINIC | Age: 63
End: 2018-05-08

## 2018-05-09 DIAGNOSIS — Z79.51 LONG TERM (CURRENT) USE OF INHALED STEROIDS: ICD-10-CM

## 2018-05-09 DIAGNOSIS — Z79.899 OTHER LONG TERM (CURRENT) DRUG THERAPY: ICD-10-CM

## 2018-05-09 DIAGNOSIS — Z79.2 LONG TERM (CURRENT) USE OF ANTIBIOTICS: ICD-10-CM

## 2018-05-09 DIAGNOSIS — K94.29 OTHER COMPLICATIONS OF GASTROSTOMY: ICD-10-CM

## 2018-05-14 ENCOUNTER — APPOINTMENT (OUTPATIENT)
Dept: UROLOGY | Facility: CLINIC | Age: 63
End: 2018-05-14

## 2018-05-16 ENCOUNTER — APPOINTMENT (OUTPATIENT)
Dept: UROLOGY | Facility: CLINIC | Age: 63
End: 2018-05-16
Payer: MEDICARE

## 2018-05-16 VITALS — HEART RATE: 70 BPM | SYSTOLIC BLOOD PRESSURE: 100 MMHG | DIASTOLIC BLOOD PRESSURE: 68 MMHG

## 2018-05-16 PROCEDURE — 51702 INSERT TEMP BLADDER CATH: CPT

## 2018-06-20 ENCOUNTER — APPOINTMENT (OUTPATIENT)
Dept: UROLOGY | Facility: CLINIC | Age: 63
End: 2018-06-20

## 2018-06-25 ENCOUNTER — APPOINTMENT (OUTPATIENT)
Dept: UROLOGY | Facility: CLINIC | Age: 63
End: 2018-06-25

## 2018-07-09 ENCOUNTER — APPOINTMENT (OUTPATIENT)
Dept: UROLOGY | Facility: CLINIC | Age: 63
End: 2018-07-09
Payer: MEDICARE

## 2018-07-09 PROCEDURE — 51700 IRRIGATION OF BLADDER: CPT

## 2018-07-17 ENCOUNTER — EMERGENCY (EMERGENCY)
Facility: HOSPITAL | Age: 63
LOS: 0 days | Discharge: HOME | End: 2018-07-17
Attending: EMERGENCY MEDICINE
Payer: MEDICARE

## 2018-07-17 VITALS
OXYGEN SATURATION: 95 % | DIASTOLIC BLOOD PRESSURE: 65 MMHG | TEMPERATURE: 100 F | RESPIRATION RATE: 22 BRPM | SYSTOLIC BLOOD PRESSURE: 116 MMHG | HEART RATE: 82 BPM

## 2018-07-17 VITALS
HEART RATE: 111 BPM | OXYGEN SATURATION: 98 % | RESPIRATION RATE: 18 BRPM | TEMPERATURE: 100 F | SYSTOLIC BLOOD PRESSURE: 188 MMHG | DIASTOLIC BLOOD PRESSURE: 99 MMHG

## 2018-07-17 DIAGNOSIS — Z93.1 GASTROSTOMY STATUS: Chronic | ICD-10-CM

## 2018-07-17 DIAGNOSIS — T83.031A LEAKAGE OF INDWELLING URETHRAL CATHETER, INITIAL ENCOUNTER: ICD-10-CM

## 2018-07-17 DIAGNOSIS — Z79.810 LONG TERM (CURRENT) USE OF SELECTIVE ESTROGEN RECEPTOR MODULATORS (SERMS): ICD-10-CM

## 2018-07-17 DIAGNOSIS — Z79.2 LONG TERM (CURRENT) USE OF ANTIBIOTICS: ICD-10-CM

## 2018-07-17 DIAGNOSIS — Z79.891 LONG TERM (CURRENT) USE OF OPIATE ANALGESIC: ICD-10-CM

## 2018-07-17 DIAGNOSIS — Z79.51 LONG TERM (CURRENT) USE OF INHALED STEROIDS: ICD-10-CM
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
## 2018-07-17 DIAGNOSIS — R56.9 UNSPECIFIED CONVULSIONS: ICD-10-CM

## 2018-07-17 DIAGNOSIS — Z79.899 OTHER LONG TERM (CURRENT) DRUG THERAPY: ICD-10-CM
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
## 2018-07-17 DIAGNOSIS — M79.661 PAIN IN RIGHT LOWER LEG: ICD-10-CM

## 2018-07-17 PROBLEM — N40.0 BENIGN PROSTATIC HYPERPLASIA WITHOUT LOWER URINARY TRACT SYMPTOMS: Chronic | Status: ACTIVE | Noted: 2018-03-27

## 2018-07-17 PROBLEM — G80.9 CEREBRAL PALSY, UNSPECIFIED: Chronic | Status: ACTIVE | Noted: 2018-03-27

## 2018-07-17 LAB
ALBUMIN SERPL ELPH-MCNC: 4.2 G/DL — SIGNIFICANT CHANGE UP (ref 3.5–5.2)
ALP SERPL-CCNC: 86 U/L — SIGNIFICANT CHANGE UP (ref 30–115)
ALT FLD-CCNC: 23 U/L — SIGNIFICANT CHANGE UP (ref 0–41)
ANION GAP SERPL CALC-SCNC: 16 MMOL/L — HIGH (ref 7–14)
APPEARANCE UR: ABNORMAL
AST SERPL-CCNC: 25 U/L — SIGNIFICANT CHANGE UP (ref 0–41)
BACTERIA # UR AUTO: ABNORMAL /HPF
BASE EXCESS BLDV CALC-SCNC: 3.8 MMOL/L — HIGH (ref -2–2)
BASOPHILS # BLD AUTO: 0.02 K/UL — SIGNIFICANT CHANGE UP (ref 0–0.2)
BASOPHILS NFR BLD AUTO: 0.3 % — SIGNIFICANT CHANGE UP (ref 0–1)
BILIRUB SERPL-MCNC: 0.3 MG/DL — SIGNIFICANT CHANGE UP (ref 0.2–1.2)
BILIRUB UR-MCNC: NEGATIVE — SIGNIFICANT CHANGE UP
BUN SERPL-MCNC: 17 MG/DL — SIGNIFICANT CHANGE UP (ref 10–20)
CA-I SERPL-SCNC: SIGNIFICANT CHANGE UP MMOL/L (ref 1.12–1.3)
CALCIUM SERPL-MCNC: 9.6 MG/DL — SIGNIFICANT CHANGE UP (ref 8.5–10.1)
CHLORIDE SERPL-SCNC: 98 MMOL/L — SIGNIFICANT CHANGE UP (ref 98–110)
CO2 SERPL-SCNC: 26 MMOL/L — SIGNIFICANT CHANGE UP (ref 17–32)
COLOR SPEC: YELLOW — SIGNIFICANT CHANGE UP
CREAT SERPL-MCNC: 0.6 MG/DL — LOW (ref 0.7–1.5)
DIFF PNL FLD: ABNORMAL
EOSINOPHIL # BLD AUTO: 0.2 K/UL — SIGNIFICANT CHANGE UP (ref 0–0.7)
EOSINOPHIL NFR BLD AUTO: 3.5 % — SIGNIFICANT CHANGE UP (ref 0–8)
GAS PNL BLDV: 138 MMOL/L — SIGNIFICANT CHANGE UP (ref 136–145)
GAS PNL BLDV: SIGNIFICANT CHANGE UP
GLUCOSE SERPL-MCNC: 113 MG/DL — HIGH (ref 70–99)
GLUCOSE UR QL: NEGATIVE MG/DL — SIGNIFICANT CHANGE UP
HCO3 BLDV-SCNC: 28 MMOL/L — SIGNIFICANT CHANGE UP (ref 22–29)
HCT VFR BLD CALC: 42.3 % — SIGNIFICANT CHANGE UP (ref 42–52)
HCT VFR BLDA CALC: 48.5 % — HIGH (ref 34–44)
HGB BLD CALC-MCNC: 15.8 G/DL — SIGNIFICANT CHANGE UP (ref 14–18)
HGB BLD-MCNC: 14.6 G/DL — SIGNIFICANT CHANGE UP (ref 14–18)
IMM GRANULOCYTES NFR BLD AUTO: 0.2 % — SIGNIFICANT CHANGE UP (ref 0.1–0.3)
KETONES UR-MCNC: NEGATIVE — SIGNIFICANT CHANGE UP
LACTATE BLDV-MCNC: SIGNIFICANT CHANGE UP MMOL/L (ref 0.5–1.6)
LEUKOCYTE ESTERASE UR-ACNC: ABNORMAL
LYMPHOCYTES # BLD AUTO: 0.94 K/UL — LOW (ref 1.2–3.4)
LYMPHOCYTES # BLD AUTO: 16.3 % — LOW (ref 20.5–51.1)
MCHC RBC-ENTMCNC: 30.9 PG — SIGNIFICANT CHANGE UP (ref 27–31)
MCHC RBC-ENTMCNC: 34.5 G/DL — SIGNIFICANT CHANGE UP (ref 32–37)
MCV RBC AUTO: 89.6 FL — SIGNIFICANT CHANGE UP (ref 80–94)
MONOCYTES # BLD AUTO: 0.57 K/UL — SIGNIFICANT CHANGE UP (ref 0.1–0.6)
MONOCYTES NFR BLD AUTO: 9.9 % — HIGH (ref 1.7–9.3)
NEUTROPHILS # BLD AUTO: 4.01 K/UL — SIGNIFICANT CHANGE UP (ref 1.4–6.5)
NEUTROPHILS NFR BLD AUTO: 69.8 % — SIGNIFICANT CHANGE UP (ref 42.2–75.2)
NITRITE UR-MCNC: NEGATIVE — SIGNIFICANT CHANGE UP
NRBC # BLD: 0 /100 WBCS — SIGNIFICANT CHANGE UP (ref 0–0)
PCO2 BLDV: 42 MMHG — SIGNIFICANT CHANGE UP (ref 41–51)
PH BLDV: 7.44 — HIGH (ref 7.26–7.43)
PH UR: 7.5 — SIGNIFICANT CHANGE UP (ref 5–8)
PLATELET # BLD AUTO: 253 K/UL — SIGNIFICANT CHANGE UP (ref 130–400)
PO2 BLDV: 66 MMHG — HIGH (ref 20–40)
POTASSIUM BLDV-SCNC: 4.2 MMOL/L — SIGNIFICANT CHANGE UP (ref 3.3–5.6)
POTASSIUM SERPL-MCNC: 4.6 MMOL/L — SIGNIFICANT CHANGE UP (ref 3.5–5)
POTASSIUM SERPL-SCNC: 4.6 MMOL/L — SIGNIFICANT CHANGE UP (ref 3.5–5)
PROT SERPL-MCNC: 7.3 G/DL — SIGNIFICANT CHANGE UP (ref 6–8)
PROT UR-MCNC: 30 MG/DL
RBC # BLD: 4.72 M/UL — SIGNIFICANT CHANGE UP (ref 4.7–6.1)
RBC # FLD: 13.8 % — SIGNIFICANT CHANGE UP (ref 11.5–14.5)
RBC CASTS # UR COMP ASSIST: >50 /HPF
SAO2 % BLDV: 83 % — SIGNIFICANT CHANGE UP
SODIUM SERPL-SCNC: 140 MMOL/L — SIGNIFICANT CHANGE UP (ref 135–146)
SP GR SPEC: 1.01 — SIGNIFICANT CHANGE UP (ref 1.01–1.03)
UROBILINOGEN FLD QL: 0.2 MG/DL — SIGNIFICANT CHANGE UP (ref 0.2–0.2)
WBC # BLD: 5.75 K/UL — SIGNIFICANT CHANGE UP (ref 4.8–10.8)
WBC # FLD AUTO: 5.75 K/UL — SIGNIFICANT CHANGE UP (ref 4.8–10.8)
WBC UR QL: >50 /HPF

## 2018-07-17 PROCEDURE — 93970 EXTREMITY STUDY: CPT | Mod: 26

## 2018-07-17 RX ORDER — CEFUROXIME AXETIL 250 MG
1 TABLET ORAL
Qty: 14 | Refills: 0 | OUTPATIENT
Start: 2018-07-17 | End: 2018-07-23

## 2018-07-17 NOTE — ED PROVIDER NOTE - PHYSICAL EXAMINATION
CONSTITUTIONAL: chronically ill appearing, contractured, in no acute distress, speaking in full sentences  SKIN: warm, dry, no rash  HEAD: normocephalic, atraumatic  EYES:  no conjunctival erythema  ENT:  patent airway  NECK: supple  CV: no murmurs, regular rate, regular rhythm  RESP: no wheezes, no rales, no rhonchi, normal work of breathing  ABD:  distended, nontender, no rebound, no guarding, PEG tube in place without surrounding erythema or swelling  : urethral alatorre cath in place, purulent discharge per urethra  EXT: no edema, R posterior calf tenderness, no erythema, no swelling  NEURO: alert, oriented

## 2018-07-17 NOTE — CONSULT NOTE ADULT - SUBJECTIVE AND OBJECTIVE BOX
62y old Male well known to  team - pt with neurogenic bladder, bladder neck contracture, chronic Edouard.  Edouard is exchanged every 2 weeks, last exchanged 2 weeks ago.  Patient presented to ER today after nurse in group home could not remove Edouard which was due to be exchanged today.  As per ER & pt aid, Edouard was draining well prior to arrival to ED.      PAST MEDICAL & SURGICAL HISTORY:  Seizure  Cerebral palsy  BPH (benign prostatic hyperplasia)  S/P percutaneous endoscopic gastrostomy (PEG) tube placement    ALLERGIES: No Known Allergies    VS: T(F): 100.3, Max: 100.3 (- @ 16:19)  HR: 82 (82 - 111)  BP: 116/65 (116/65 - 188/99)  RR: 22  SpO2: 95% (95% - 98%)  GEN: Alert, awake, NAD  ABD/: +distended bladder, palpable at umbilicus, tender, 18F catheter in meatus - unable to irrigate, not draining urine, PEG in place    Existing 18F catheter balloon deflated completely and removed in entirety using some force.  Upon removal, catheter noted to be encrusted.  No bleeding from meatus or discharge.   Under sterile technique, placed 18F catheter without difficulty. Drained 500cc clear yellow urine. Patient tolerated procedure well.    LABS/IMAGIN.6   5.75  )-----------( 253      ( 2018 14:35 )             42.3     -    140  |  98  |  17  ----------------------------<  113<H>  4.6   |  26  |  0.6<L>    Ca    9.6      2018 14:35    TPro  7.3  /  Alb  4.2  /  TBili  0.3  /  DBili  x   /  AST  25  /  ALT  23  /  AlkPhos  86  -    Urinalysis Basic - ( 2018 15:50 )  Color: Yellow / Appearance: Cloudy / S.015 / pH: x  Gluc: x / Ketone: Negative  / Bili: Negative / Urobili: 0.2 mg/dL   Blood: x / Protein: 30 mg/dL / Nitrite: Negative   Leuk Esterase: Large / RBC: x / WBC x   Sq Epi: x / Non Sq Epi: x / Bacteria: x

## 2018-07-17 NOTE — ED PROVIDER NOTE - NS ED ROS FT
GEN:  no fever, no chills  NEURO:  no headache, no weakness  ENT:  no hearing changes, no ear pain, no sore throat, no runny nose, no difficulty swallowing  CV:  no chest pain, no SOB, no palpitations, no edema  RESP:  no dyspnea, no cough, no hemoptysis  GI:  no nausea, no vomiting, no abdominal pain, no diarrhea, no constipation  :  no dysuria, no urinary frequency, no hematuria  MSK:  + leg pain  SKIN:  no rash  HEME: no easy bruising or bleeding, no hematochezia, no melena  PSYCH:  no homicidal ideation, no suicidal ideation, no visual hallucinations, no auditory hallucinations GEN:  no fever, no chills  NEURO:  no headache, no weakness  ENT:  no hearing changes, no ear pain, no sore throat, no runny nose, no difficulty swallowing  CV:  no chest pain, no SOB, no palpitations, no edema  RESP:  no dyspnea, no cough, no hemoptysis  GI:  no nausea, no vomiting, no abdominal pain, no diarrhea, no constipation  :  no dysuria, no urinary frequency, no hematuria  MSK:  + leg pain  SKIN:  no rash  HEME: no easy bruising or bleeding, no hematochezia, no melena

## 2018-07-17 NOTE — ED PROVIDER NOTE - MEDICAL DECISION MAKING DETAILS
alatorre with difficult removal - urology remved and calcification noted - abx given - no evidence of sepsis requiring IV abx and admission so patient discahrged

## 2018-07-17 NOTE — ED PROVIDER NOTE - OBJECTIVE STATEMENT
61 yo M with cerebral palsy who was sent from [facility] to have his alatorre catheter changed. Nurses at [facility] had difficulty removing his alatorre and were meeting resistance so they sent him to the ED. Pt normally has his alatorre changed [id number]. They also noticed abdominal distension. Pt currently denies abdominal pain, nausea, vomiting, fever. Currently only complaining of R calf pain. No trauma, chest pain, SOB, leg swelling.

## 2018-07-17 NOTE — ED PROVIDER NOTE - ATTENDING CONTRIBUTION TO CARE
Pt is a 63yo male with hx of CP and chronic alatorre that the RNs were unable to remove so snet him in.  NMo fever but pus coming from around alatorre site.  Last changed 2 weeks ago.    Exam: distended tender abdomen, US showed very distended bladder with debris, cap refill <2s, chronic contracture  Imp: alatorre malfunction  Plan: labs, UA, Urology consult

## 2018-07-17 NOTE — ED PROVIDER NOTE - PROGRESS NOTE DETAILS
Unable to remove alatorre - Urology PA at bedside - unable tro remove alatorre either. Dr. Timmons aware - alatorre replaced.  Clean yellow urine passing now.  Will give abx coverage.  Pending duplex. Unable to remove alatorre - Urology PA at bedside - unable to remove alatorre either. Wet read duplex negative for DVT

## 2018-07-17 NOTE — CONSULT NOTE ADULT - ASSESSMENT
62y old Male well known to  team - pt with neurogenic bladder/BPH, bladder neck contracture, chronic Edouard.  Obstructed/dislodged and encrusted Edouard removed and replaced.     PLAN:  1.	Would start on PO abx for urine ppx and manipulation given low grade temp  2.	Routine catheter change in 2 weeks - pt is good candidate for SPT  3.	Discussed with Dr. Timmons

## 2018-07-19 LAB
-  AMIKACIN: SIGNIFICANT CHANGE UP
-  AMIKACIN: SIGNIFICANT CHANGE UP
-  AMOXICILLIN/CLAVULANIC ACID: SIGNIFICANT CHANGE UP
-  AMPICILLIN/SULBACTAM: SIGNIFICANT CHANGE UP
-  AMPICILLIN: SIGNIFICANT CHANGE UP
-  AZTREONAM: SIGNIFICANT CHANGE UP
-  AZTREONAM: SIGNIFICANT CHANGE UP
-  CEFAZOLIN: SIGNIFICANT CHANGE UP
-  CEFEPIME: SIGNIFICANT CHANGE UP
-  CEFEPIME: SIGNIFICANT CHANGE UP
-  CEFOXITIN: SIGNIFICANT CHANGE UP
-  CEFTAZIDIME: SIGNIFICANT CHANGE UP
-  CEFTRIAXONE: SIGNIFICANT CHANGE UP
-  CIPROFLOXACIN: SIGNIFICANT CHANGE UP
-  CIPROFLOXACIN: SIGNIFICANT CHANGE UP
-  ERTAPENEM: SIGNIFICANT CHANGE UP
-  GENTAMICIN: SIGNIFICANT CHANGE UP
-  GENTAMICIN: SIGNIFICANT CHANGE UP
-  IMIPENEM: SIGNIFICANT CHANGE UP
-  IMIPENEM: SIGNIFICANT CHANGE UP
-  LEVOFLOXACIN: SIGNIFICANT CHANGE UP
-  LEVOFLOXACIN: SIGNIFICANT CHANGE UP
-  MEROPENEM: SIGNIFICANT CHANGE UP
-  MEROPENEM: SIGNIFICANT CHANGE UP
-  NITROFURANTOIN: SIGNIFICANT CHANGE UP
-  PIPERACILLIN/TAZOBACTAM: SIGNIFICANT CHANGE UP
-  PIPERACILLIN/TAZOBACTAM: SIGNIFICANT CHANGE UP
-  TIGECYCLINE: SIGNIFICANT CHANGE UP
-  TOBRAMYCIN: SIGNIFICANT CHANGE UP
-  TOBRAMYCIN: SIGNIFICANT CHANGE UP
-  TRIMETHOPRIM/SULFAMETHOXAZOLE: SIGNIFICANT CHANGE UP
CULTURE RESULTS: SIGNIFICANT CHANGE UP
METHOD TYPE: SIGNIFICANT CHANGE UP
METHOD TYPE: SIGNIFICANT CHANGE UP
ORGANISM # SPEC MICROSCOPIC CNT: SIGNIFICANT CHANGE UP
SPECIMEN SOURCE: SIGNIFICANT CHANGE UP

## 2018-07-20 RX ORDER — MOXIFLOXACIN HYDROCHLORIDE TABLETS, 400 MG 400 MG/1
1 TABLET, FILM COATED ORAL
Qty: 14 | Refills: 0 | OUTPATIENT
Start: 2018-07-20 | End: 2018-07-26

## 2018-07-20 NOTE — ED POST DISCHARGE NOTE - RESULT SUMMARY
+ UCX- CIPROFLOXACIN ADDED TO COVER PSEUDOMONAS NOTED ON UCX. SPOKE WITH RN, LUIS EDUARDO AT [facility] WHERE PATIENT IS A RESIDENT.

## 2018-07-25 ENCOUNTER — APPOINTMENT (OUTPATIENT)
Dept: UROLOGY | Facility: CLINIC | Age: 63
End: 2018-07-25
Payer: MEDICARE

## 2018-07-25 PROCEDURE — 51702 INSERT TEMP BLADDER CATH: CPT

## 2018-08-21 ENCOUNTER — OUTPATIENT (OUTPATIENT)
Dept: OUTPATIENT SERVICES | Facility: HOSPITAL | Age: 63
LOS: 1 days | Discharge: HOME | End: 2018-08-21

## 2018-08-21 VITALS — WEIGHT: 134.92 LBS | HEIGHT: 61 IN

## 2018-08-21 VITALS — HEART RATE: 89 BPM | RESPIRATION RATE: 18 BRPM | SYSTOLIC BLOOD PRESSURE: 109 MMHG | DIASTOLIC BLOOD PRESSURE: 62 MMHG

## 2018-08-21 DIAGNOSIS — Z93.1 GASTROSTOMY STATUS: Chronic | ICD-10-CM

## 2018-08-22 ENCOUNTER — EMERGENCY (EMERGENCY)
Facility: HOSPITAL | Age: 63
LOS: 0 days | Discharge: HOME | End: 2018-08-22
Attending: EMERGENCY MEDICINE | Admitting: EMERGENCY MEDICINE

## 2018-08-22 VITALS
TEMPERATURE: 99 F | DIASTOLIC BLOOD PRESSURE: 65 MMHG | SYSTOLIC BLOOD PRESSURE: 109 MMHG | OXYGEN SATURATION: 95 % | RESPIRATION RATE: 18 BRPM | HEART RATE: 88 BPM

## 2018-08-22 VITALS
RESPIRATION RATE: 17 BRPM | HEART RATE: 86 BPM | OXYGEN SATURATION: 96 % | TEMPERATURE: 99 F | SYSTOLIC BLOOD PRESSURE: 112 MMHG | DIASTOLIC BLOOD PRESSURE: 68 MMHG

## 2018-08-22 DIAGNOSIS — Z93.1 GASTROSTOMY STATUS: ICD-10-CM

## 2018-08-22 DIAGNOSIS — Z79.899 OTHER LONG TERM (CURRENT) DRUG THERAPY: ICD-10-CM

## 2018-08-22 DIAGNOSIS — N39.0 URINARY TRACT INFECTION, SITE NOT SPECIFIED: ICD-10-CM

## 2018-08-22 DIAGNOSIS — Z93.1 GASTROSTOMY STATUS: Chronic | ICD-10-CM

## 2018-08-22 DIAGNOSIS — Z79.891 LONG TERM (CURRENT) USE OF OPIATE ANALGESIC: ICD-10-CM

## 2018-08-22 LAB
ALBUMIN SERPL ELPH-MCNC: 3.9 G/DL — SIGNIFICANT CHANGE UP (ref 3.5–5.2)
ALP SERPL-CCNC: 83 U/L — SIGNIFICANT CHANGE UP (ref 30–115)
ALT FLD-CCNC: 21 U/L — SIGNIFICANT CHANGE UP (ref 0–41)
ANION GAP SERPL CALC-SCNC: 15 MMOL/L — HIGH (ref 7–14)
APPEARANCE UR: ABNORMAL
AST SERPL-CCNC: 23 U/L — SIGNIFICANT CHANGE UP (ref 0–41)
BACTERIA # UR AUTO: ABNORMAL /HPF
BILIRUB SERPL-MCNC: 0.4 MG/DL — SIGNIFICANT CHANGE UP (ref 0.2–1.2)
BILIRUB UR-MCNC: NEGATIVE — SIGNIFICANT CHANGE UP
BUN SERPL-MCNC: 21 MG/DL — HIGH (ref 10–20)
CALCIUM SERPL-MCNC: 9.1 MG/DL — SIGNIFICANT CHANGE UP (ref 8.5–10.1)
CHLORIDE SERPL-SCNC: 103 MMOL/L — SIGNIFICANT CHANGE UP (ref 98–110)
CO2 SERPL-SCNC: 26 MMOL/L — SIGNIFICANT CHANGE UP (ref 17–32)
COLOR SPEC: YELLOW — SIGNIFICANT CHANGE UP
CREAT SERPL-MCNC: 0.6 MG/DL — LOW (ref 0.7–1.5)
DIFF PNL FLD: ABNORMAL
GLUCOSE SERPL-MCNC: 98 MG/DL — SIGNIFICANT CHANGE UP (ref 70–99)
GLUCOSE UR QL: NEGATIVE MG/DL — SIGNIFICANT CHANGE UP
HCT VFR BLD CALC: 41.2 % — LOW (ref 42–52)
HGB BLD-MCNC: 13.9 G/DL — LOW (ref 14–18)
KETONES UR-MCNC: NEGATIVE — SIGNIFICANT CHANGE UP
LEUKOCYTE ESTERASE UR-ACNC: ABNORMAL
MCHC RBC-ENTMCNC: 30.2 PG — SIGNIFICANT CHANGE UP (ref 27–31)
MCHC RBC-ENTMCNC: 33.7 G/DL — SIGNIFICANT CHANGE UP (ref 32–37)
MCV RBC AUTO: 89.6 FL — SIGNIFICANT CHANGE UP (ref 80–94)
NITRITE UR-MCNC: NEGATIVE — SIGNIFICANT CHANGE UP
NRBC # BLD: 0 /100 WBCS — SIGNIFICANT CHANGE UP (ref 0–0)
PH UR: 7 — SIGNIFICANT CHANGE UP (ref 5–8)
PLATELET # BLD AUTO: 193 K/UL — SIGNIFICANT CHANGE UP (ref 130–400)
POTASSIUM SERPL-MCNC: 4.8 MMOL/L — SIGNIFICANT CHANGE UP (ref 3.5–5)
POTASSIUM SERPL-SCNC: 4.8 MMOL/L — SIGNIFICANT CHANGE UP (ref 3.5–5)
PROT SERPL-MCNC: 6.5 G/DL — SIGNIFICANT CHANGE UP (ref 6–8)
PROT UR-MCNC: 30 MG/DL
RBC # BLD: 4.6 M/UL — LOW (ref 4.7–6.1)
RBC # FLD: 13.5 % — SIGNIFICANT CHANGE UP (ref 11.5–14.5)
RBC CASTS # UR COMP ASSIST: ABNORMAL /HPF
SODIUM SERPL-SCNC: 144 MMOL/L — SIGNIFICANT CHANGE UP (ref 135–146)
SP GR SPEC: 1.02 — SIGNIFICANT CHANGE UP (ref 1.01–1.03)
UROBILINOGEN FLD QL: 0.2 MG/DL — SIGNIFICANT CHANGE UP (ref 0.2–0.2)
WBC # BLD: 5.45 K/UL — SIGNIFICANT CHANGE UP (ref 4.8–10.8)
WBC # FLD AUTO: 5.45 K/UL — SIGNIFICANT CHANGE UP (ref 4.8–10.8)
WBC UR QL: >50 /HPF

## 2018-08-22 RX ORDER — CEFDINIR 250 MG/5ML
1 POWDER, FOR SUSPENSION ORAL
Qty: 20 | Refills: 0 | OUTPATIENT
Start: 2018-08-22 | End: 2018-08-31

## 2018-08-22 RX ORDER — CEFTRIAXONE 500 MG/1
1 INJECTION, POWDER, FOR SOLUTION INTRAMUSCULAR; INTRAVENOUS ONCE
Qty: 0 | Refills: 0 | Status: COMPLETED | OUTPATIENT
Start: 2018-08-22 | End: 2018-08-22

## 2018-08-22 RX ADMIN — CEFTRIAXONE 100 GRAM(S): 500 INJECTION, POWDER, FOR SOLUTION INTRAMUSCULAR; INTRAVENOUS at 19:45

## 2018-08-22 NOTE — ED PROVIDER NOTE - OBJECTIVE STATEMENT
62 year old male with pmhx of CP, UTI, with alatorre catheter, presents for catheter complication. Aide tried to change alatorre but felt resistance. Pt denies abdominal pain, N/V/D, fever, or chills.

## 2018-08-22 NOTE — ED PROVIDER NOTE - PHYSICAL EXAMINATION
CONST: Well appearing in NAD  EYES: PERRL, EOMI, Sclera and conjunctiva clear.   ENT: No nasal discharge. TM's clear B/L without drainage. Oropharynx normal appearing, no erythema or exudates. Uvula midline.  NECK: Non-tender, no meningeal signs  CARD: Normal S1 S2; Normal rate and rhythm  RESP: Equal BS B/L, No wheezes, rhonchi or rales. No distress  GI: Soft, non-tender, non-distended. no cva tenderness  MS: Normal ROM in all extremities. No midline spinal tenderness.  SKIN: Warm, dry, no acute rashes. Good turgor

## 2018-08-22 NOTE — ED PROVIDER NOTE - ATTENDING CONTRIBUTION TO CARE
63 y/o male with h/o CP, seizures, BPH with indwelling alatorre, sent to ER for alatorre reinsertion.  Alatorre being changed by aide and was unable to reinsert new alatorre so pt sent to ER.  Pt with no complaints.  Per aide at bedside, pt at baseline.  no f/c.  no n/v.  normal PO intake.   pe - nad, eomi, op - clear, cta b/l, no w/r/r, rrr, abd - soft, nt, + peg in place, from x 4, awake and alert, no focal neuro deficits.  -alatorre inserted by rn in ED, to check labs, ua.

## 2018-08-22 NOTE — ED PROVIDER NOTE - MEDICAL DECISION MAKING DETAILS
pt with h/o CP, BPH with indwelling alatorre, sent to ER for alatorre reinsertion after aide was unable to replace alatorre during routine change.  alatorre replaced in er, ua + uti, pt d/c'd with abx.  to f/u with gu and pt/aide told to return to ER for any new/concerning symptoms.

## 2018-08-22 NOTE — ED ADULT NURSE NOTE - OBJECTIVE STATEMENT
Patient had alatorre removed this morning by visiting nurse, was unable to reinsert due to resistance. As per aide patient has not voided since

## 2018-08-22 NOTE — ED ADULT TRIAGE NOTE - CHIEF COMPLAINT QUOTE
Edouard catheter was removed today, nurse at group was unable to replace catheter. alert and in no distress.

## 2018-08-22 NOTE — ED ADULT NURSE NOTE - NSIMPLEMENTINTERV_GEN_ALL_ED
Implemented All Fall with Harm Risk Interventions:  [place] to call system. Call bell, personal items and telephone within reach. Instruct patient to call for assistance. Room bathroom lighting operational. Non-slip footwear when patient is off stretcher. Physically safe environment: no spills, clutter or unnecessary equipment. Stretcher in lowest position, wheels locked, appropriate side rails in place. Provide visual cue, wrist band, yellow gown, etc. Monitor gait and stability. Monitor for mental status changes and reorient to person, place, and time. Review medications for side effects contributing to fall risk. Reinforce activity limits and safety measures with patient and family. Provide visual clues: red socks.

## 2018-08-24 LAB
-  AMIKACIN: SIGNIFICANT CHANGE UP
-  AZTREONAM: SIGNIFICANT CHANGE UP
-  CEFEPIME: SIGNIFICANT CHANGE UP
-  CEFTAZIDIME: SIGNIFICANT CHANGE UP
-  CIPROFLOXACIN: SIGNIFICANT CHANGE UP
-  GENTAMICIN: SIGNIFICANT CHANGE UP
-  IMIPENEM: SIGNIFICANT CHANGE UP
-  LEVOFLOXACIN: SIGNIFICANT CHANGE UP
-  MEROPENEM: SIGNIFICANT CHANGE UP
-  PIPERACILLIN/TAZOBACTAM: SIGNIFICANT CHANGE UP
-  TOBRAMYCIN: SIGNIFICANT CHANGE UP
CULTURE RESULTS: SIGNIFICANT CHANGE UP
METHOD TYPE: SIGNIFICANT CHANGE UP
ORGANISM # SPEC MICROSCOPIC CNT: SIGNIFICANT CHANGE UP
ORGANISM # SPEC MICROSCOPIC CNT: SIGNIFICANT CHANGE UP
SPECIMEN SOURCE: SIGNIFICANT CHANGE UP

## 2018-08-27 DIAGNOSIS — R56.9 UNSPECIFIED CONVULSIONS: ICD-10-CM

## 2018-08-27 DIAGNOSIS — G80.9 CEREBRAL PALSY, UNSPECIFIED: ICD-10-CM

## 2018-08-27 DIAGNOSIS — Z46.59 ENCOUNTER FOR FITTING AND ADJUSTMENT OF OTHER GASTROINTESTINAL APPLIANCE AND DEVICE: ICD-10-CM

## 2018-08-27 DIAGNOSIS — N40.0 BENIGN PROSTATIC HYPERPLASIA WITHOUT LOWER URINARY TRACT SYMPTOMS: ICD-10-CM

## 2018-08-27 DIAGNOSIS — R13.12 DYSPHAGIA, OROPHARYNGEAL PHASE: ICD-10-CM

## 2018-08-27 DIAGNOSIS — K22.8 OTHER SPECIFIED DISEASES OF ESOPHAGUS: ICD-10-CM

## 2018-08-31 ENCOUNTER — EMERGENCY (EMERGENCY)
Facility: HOSPITAL | Age: 63
LOS: 0 days | Discharge: HOME | End: 2018-08-31
Attending: EMERGENCY MEDICINE | Admitting: EMERGENCY MEDICINE

## 2018-08-31 VITALS
DIASTOLIC BLOOD PRESSURE: 62 MMHG | TEMPERATURE: 98 F | RESPIRATION RATE: 18 BRPM | SYSTOLIC BLOOD PRESSURE: 117 MMHG | HEART RATE: 79 BPM | OXYGEN SATURATION: 99 %
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
## 2018-08-31 DIAGNOSIS — Z93.1 GASTROSTOMY STATUS: Chronic | ICD-10-CM

## 2018-08-31 DIAGNOSIS — Z79.899 OTHER LONG TERM (CURRENT) DRUG THERAPY: ICD-10-CM
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
## 2018-08-31 DIAGNOSIS — Z79.2 LONG TERM (CURRENT) USE OF ANTIBIOTICS: ICD-10-CM

## 2018-08-31 DIAGNOSIS — T83.091A OTHER MECHANICAL COMPLICATION OF INDWELLING URETHRAL CATHETER, INITIAL ENCOUNTER: ICD-10-CM
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
## 2018-08-31 DIAGNOSIS — Z98.890 OTHER SPECIFIED POSTPROCEDURAL STATES: ICD-10-CM

## 2018-08-31 NOTE — ED PROVIDER NOTE - PHYSICAL EXAMINATION
CONSTITUTIONAL: Well-developed; well-nourished; in no acute distress.   SKIN: warm, dry  HEAD: Normocephalic; atraumatic.  EYES: PERRL, EOMI, normal sclera and conjunctiva   ENT: No nasal discharge; airway clear.  NECK: Supple; non tender.  CARD: S1, S2 normal; no murmurs, gallops, or rubs. Regular rate and rhythm.   RESP: No wheezes, rales or rhonchi.  ABD: soft ntnd. PEg placed correctly and functioning  EXT: Normal ROM for CP.  No clubbing, cyanosis or edema.   LYMPH: No acute cervical adenopathy.  NEURO: Alert, oriented, grossly unremarkable  PSYCH: Cooperative, appropriate.  : by exam, nurse already placed new alatorre with no difficulty.

## 2018-08-31 NOTE — ED PROVIDER NOTE - MEDICAL DECISION MAKING DETAILS
61 y/o M w hx of cerebral palsy presented with dislodge alatorre. Alatorre replaced. Good urinary output appreciated. Patient discharged.

## 2018-08-31 NOTE — ED PROVIDER NOTE - OBJECTIVE STATEMENT
pt 62n yom w/ CP, PEG placement, alatorre, here because aide tried to replace alatorre today but met resistance so they brought him in to ED. denies fever, abdominal pain, cp, sob, dysuria

## 2018-08-31 NOTE — ED PROVIDER NOTE - PROGRESS NOTE DETAILS
pt in NAD, non febrile. alatorre in place. I personally evaluated the patient. I reviewed the Resident’s note (as assigned above), and agree with the findings and plan except as documented in my note.   61 y/o M PMHx MR, wheelchair bound presents for dislodged Edouard catheter. Nursing home attempted to reposition catheter without success, Pt denies any abdominal pain. No fevers or chills. PE: NAD, NCAT. MMM. Heart RRR, no m/r/g. Lungs CTAB. Abdomen soft, no guarding or rigidity. Plan: New Edouard successfully placed, good urine appreciated. Pt d/c from ED in stable condition.

## 2018-08-31 NOTE — ED ADULT NURSE NOTE - OBJECTIVE STATEMENT
Nurse at group home was attempting to change Edouard and was un able to. Patient had on episode of inconstance since folly was removed unsure if bloody

## 2018-08-31 NOTE — ED PROVIDER NOTE - ATTENDING CONTRIBUTION TO CARE
I personally evaluated the patient. I reviewed the Resident’s or Physician Assistant’s note (as assigned above), and agree with the findings and plan except as documented in my note.    see my note with the scribe

## 2018-09-04 ENCOUNTER — RESULT REVIEW (OUTPATIENT)
Age: 63
End: 2018-09-04

## 2018-09-04 ENCOUNTER — OUTPATIENT (OUTPATIENT)
Dept: OUTPATIENT SERVICES | Facility: HOSPITAL | Age: 63
LOS: 1 days | Discharge: HOME | End: 2018-09-04

## 2018-09-04 VITALS — HEART RATE: 103 BPM | SYSTOLIC BLOOD PRESSURE: 111 MMHG | RESPIRATION RATE: 20 BRPM | DIASTOLIC BLOOD PRESSURE: 86 MMHG

## 2018-09-04 VITALS
TEMPERATURE: 97 F | DIASTOLIC BLOOD PRESSURE: 82 MMHG | SYSTOLIC BLOOD PRESSURE: 101 MMHG | RESPIRATION RATE: 112 BRPM | WEIGHT: 117.07 LBS | HEIGHT: 60 IN

## 2018-09-04 DIAGNOSIS — Z93.1 GASTROSTOMY STATUS: Chronic | ICD-10-CM

## 2018-09-04 NOTE — PRE-ANESTHESIA EVALUATION ADULT - NSANTHPMHFT_GEN_ALL_CORE
Cerebral Palsy, non verbal.  NPO > 24 Hrs. last time EGD aborted as there was large quantity if fluid found in the stomach, sister gave phone consent for the procedure and Iv sedation, possibility of vomiting and aspiration, and possible intubation and ventilation explained to the sister understood and agreed.

## 2018-09-04 NOTE — ASU DISCHARGE PLAN (ADULT/PEDIATRIC). - NOTIFY
Excessive Diarrhea/Inability to Tolerate Liquids or Foods/Unable to Urinate/Fever greater than 101/Pain not relieved by Medications/Swelling that continues/Persistent Nausea and Vomiting/Bleeding that does not stop

## 2018-09-06 LAB — SURGICAL PATHOLOGY STUDY: SIGNIFICANT CHANGE UP

## 2018-09-07 DIAGNOSIS — G80.9 CEREBRAL PALSY, UNSPECIFIED: ICD-10-CM

## 2018-09-07 DIAGNOSIS — K94.23 GASTROSTOMY MALFUNCTION: ICD-10-CM

## 2018-09-07 DIAGNOSIS — G40.909 EPILEPSY, UNSPECIFIED, NOT INTRACTABLE, WITHOUT STATUS EPILEPTICUS: ICD-10-CM

## 2018-09-07 DIAGNOSIS — N40.0 BENIGN PROSTATIC HYPERPLASIA WITHOUT LOWER URINARY TRACT SYMPTOMS: ICD-10-CM

## 2018-09-07 DIAGNOSIS — K29.50 UNSPECIFIED CHRONIC GASTRITIS WITHOUT BLEEDING: ICD-10-CM

## 2018-09-14 ENCOUNTER — EMERGENCY (EMERGENCY)
Facility: HOSPITAL | Age: 63
LOS: 0 days | Discharge: HOME | End: 2018-09-14
Attending: EMERGENCY MEDICINE | Admitting: EMERGENCY MEDICINE

## 2018-09-14 VITALS
RESPIRATION RATE: 20 BRPM | SYSTOLIC BLOOD PRESSURE: 100 MMHG | HEART RATE: 104 BPM | DIASTOLIC BLOOD PRESSURE: 60 MMHG | OXYGEN SATURATION: 94 %

## 2018-09-14 DIAGNOSIS — Z86.69 PERSONAL HISTORY OF OTHER DISEASES OF THE NERVOUS SYSTEM AND SENSE ORGANS: ICD-10-CM

## 2018-09-14 DIAGNOSIS — Z79.84 LONG TERM (CURRENT) USE OF ORAL HYPOGLYCEMIC DRUGS: ICD-10-CM
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
## 2018-09-14 DIAGNOSIS — T83.098A OTHER MECHANICAL COMPLICATION OF OTHER URINARY CATHETER, INITIAL ENCOUNTER: ICD-10-CM
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
## 2018-09-14 DIAGNOSIS — Z93.1 GASTROSTOMY STATUS: Chronic | ICD-10-CM
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
## 2018-09-14 DIAGNOSIS — N13.9 OBSTRUCTIVE AND REFLUX UROPATHY, UNSPECIFIED: ICD-10-CM

## 2018-09-14 DIAGNOSIS — Z79.2 LONG TERM (CURRENT) USE OF ANTIBIOTICS: ICD-10-CM

## 2018-09-14 DIAGNOSIS — Z79.811 LONG TERM (CURRENT) USE OF AROMATASE INHIBITORS: ICD-10-CM

## 2018-09-14 DIAGNOSIS — Z79.891 LONG TERM (CURRENT) USE OF OPIATE ANALGESIC: ICD-10-CM

## 2018-09-14 DIAGNOSIS — Z79.51 LONG TERM (CURRENT) USE OF INHALED STEROIDS: ICD-10-CM
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
## 2018-09-14 NOTE — ED PROVIDER NOTE - NS ED ROS FT
Constitutional: See HPI. No fever/chills.  Eyes: No visual changes, eye pain or discharge.  ENT: No hearing changes, pain, discharge or infections.  Neck: No neck pain or stiffness.  Cardiac: No chest pain, SOB or edema. No chest pain with exertion.  Respiratory: No cough or respiratory distress. No hemoptysis.   GI: No nausea, vomiting, diarrhea or abdominal pain.  : No dysuria, frequency or burning.   MS: No myalgia, muscle weakness, joint pain or back pain.  Neuro: No headache or weakness. No LOC.  Skin: No rash.  Except as documented in the HPI, all other systems are negative.

## 2018-09-14 NOTE — ED PROVIDER NOTE - PHYSICAL EXAMINATION
VITAL SIGNS: I have reviewed nursing notes and confirm.  CONSTITUTIONAL: Pt is well-appearing, non-toxic, in no-acute distress.  SKIN: No skin changes.  HEAD: NCAT.  EYES: PERRL, EOMI, conjunctiva and sclera clear.  ENT: MMM, OP clear, TM's clear b/l.  NECK: Supple; non tender.  CARD: S1S2 regular, RRR, 2+ radial pulses equal and symmetric b/l.  RESP: Lungs CTAB.  ABD: Soft, NT/ND, no pulsatile masses.  EXT: Chronic contractures and limited mobility.   : Normal external genitalia.   NEURO: AAOx3, CNII-XII intact, normal speech, strength 5/5 in all extremities, sensation fully intact, reflex b/l 2+ equal x all 4 extremities.  PSYCH: Cooperative, appropriate.

## 2018-09-14 NOTE — ED PROVIDER NOTE - OBJECTIVE STATEMENT
63 y/o M with PMH of cerebral palsy and seizure disorder, presents BIB group home after having an uninformed visit from nurse who removed his chronic indwelling alatorre and could not replace it around 10 am today. Pt denies fever/chills N/V/D, abdominal pain, flank pain and dysuria. Pt is able to pass small amounts of urine.

## 2018-09-14 NOTE — ED PROVIDER NOTE - PROGRESS NOTE DETAILS
A/P: Will replace alatorre and d/c to group home with instructions to only remove alatorre as instructed by PMD

## 2018-09-14 NOTE — ED ADULT NURSE NOTE - NSIMPLEMENTINTERV_GEN_ALL_ED
Implemented All Fall with Harm Risk Interventions:  [place] to call system. Call bell, personal items and telephone within reach. Instruct patient to call for assistance. Room bathroom lighting operational. Non-slip footwear when patient is off stretcher. Physically safe environment: no spills, clutter or unnecessary equipment. Stretcher in lowest position, wheels locked, appropriate side rails in place. Provide visual cue, wrist band, yellow gown, etc. Monitor gait and stability. Monitor for mental status changes and reorient to person, place, and time. Review medications for side effects contributing to fall risk. Reinforce activity limits and safety measures with patient and family. Provide visual clues: red socks.

## 2018-09-17 ENCOUNTER — APPOINTMENT (OUTPATIENT)
Dept: UROLOGY | Facility: CLINIC | Age: 63
End: 2018-09-17
Payer: MEDICARE

## 2018-09-17 PROCEDURE — 99213 OFFICE O/P EST LOW 20 MIN: CPT

## 2018-09-17 NOTE — LETTER BODY
[Dear  ___] : Dear  [unfilled], [Courtesy Letter:] : I had the pleasure of seeing your patient, [unfilled], in my office today. [Please see my note below.] : Please see my note below. [Consult Closing:] : Thank you very much for allowing me to participate in the care of this patient.  If you have any questions, please do not hesitate to contact me. [Sincerely,] : Sincerely, [FreeTextEntry2] : Dr. Brigido Rao \par \par  [FreeTextEntry3] : Solis Joyner MD\par Director of Male Sexual Dysfunction and Urologic Prosthetics

## 2018-09-17 NOTE — LETTER HEADER
[FreeTextEntry3] : Dr. Solis Vasquez\par [address] [place] [address]\par [address]\[place] [address]

## 2018-09-17 NOTE — HISTORY OF PRESENT ILLNESS
[FreeTextEntry1] : TISH SHAIKH is a 62 year old male with a past medical history of cerebral palsy and urinary retention. Presents to the office today for a follow up\par Patient accompanied by two aides from home. Patient was in ER on 9/14/2018 for catheter change, aide states that visiting nurse was unable to change the Edouard catheter. Spoke with Sheri, manager from Home [facility] services and she stated that the nurses have been having issues inserting Edouard catheter. Patient has Edouard catheter

## 2018-09-21 ENCOUNTER — EMERGENCY (EMERGENCY)
Facility: HOSPITAL | Age: 63
LOS: 0 days | Discharge: HOME | End: 2018-09-21
Attending: STUDENT IN AN ORGANIZED HEALTH CARE EDUCATION/TRAINING PROGRAM | Admitting: STUDENT IN AN ORGANIZED HEALTH CARE EDUCATION/TRAINING PROGRAM

## 2018-09-21 VITALS
TEMPERATURE: 97 F | HEART RATE: 101 BPM | RESPIRATION RATE: 20 BRPM | SYSTOLIC BLOOD PRESSURE: 119 MMHG | OXYGEN SATURATION: 96 % | DIASTOLIC BLOOD PRESSURE: 71 MMHG

## 2018-09-21 DIAGNOSIS — Z79.899 OTHER LONG TERM (CURRENT) DRUG THERAPY: ICD-10-CM

## 2018-09-21 DIAGNOSIS — K94.23 GASTROSTOMY MALFUNCTION: ICD-10-CM

## 2018-09-21 DIAGNOSIS — Z93.1 GASTROSTOMY STATUS: Chronic | ICD-10-CM

## 2018-09-21 NOTE — ED PROVIDER NOTE - PHYSICAL EXAMINATION
GENERAL: NAD, well-developed, AAOx3  HEENT: Atraumatic. EOMI. PERRLA  CARDIAC: S1S2. RRR. No murmurs appreciated  LUNGS: Bilateral breath sounds. Clear to auscultation  ABDOMEN: Nontender, distended. Positive bowel sounds in all four quadrants. PEG tube insertion site clean, dry, intact. No tenderness to palpation around insertion site.   SKIN: No rashes or lesions  EXTREMITIES: Bilateral hand contractures. No clubbing, cyanosis, edema

## 2018-09-21 NOTE — ED PROVIDER NOTE - PMH
BPH (benign prostatic hyperplasia)    Cerebral palsy    Osteoporosis    Seizure BPH (benign prostatic hyperplasia)    Cerebral palsy    Osteoporosis    Seizure    Spastic quadriplegia

## 2018-09-21 NOTE — ED PROVIDER NOTE - ATTENDING CONTRIBUTION TO CARE
61 yo m hx CP, mild dev delay, spastic quad, sz d/o here for possible g tube malfunction. when being fed in AM, staff felt resistance and were not able to flush.  pt concerned it may be displaced. no abdominal pain.  Tube initially placed april and recently replaced sept 4th.     vss  abdo- distended, mild erythema around tube site, no active oozing. feed in tube. no tenderness      will trouble shoot gtube- milk the tube, warm water infusion, no sign of displacement on exam. will consider imaging

## 2018-09-21 NOTE — ED PROVIDER NOTE - PROGRESS NOTE DETAILS
gtube functioning properly after irrigation with warm water. no sign of extravisation. no abdominal tenderness or erythema. will d/c

## 2018-09-21 NOTE — ED PROVIDER NOTE - OBJECTIVE STATEMENT
63 yo male with PMHx of cerebral palsy, mild developmental delay, spastic quadriplegia, seizure disorder, and osteoporosis presenting from group home for suspected malfunctioning G-tube and abdominal distention. Per aide, when they were trying to flush the tube this AM, they met resistance and stopped. Patient also states he felt uncomfortable as they were flushing the tube. Per charts, PEG tube initially placed in April 2018 for dysphagia and malnutrition, replaced on 9/4/18 by GI. Patient denies nausea, abdominal pain, fevers, chills. Complains of bilateral leg pains/numbness - PMD aware and patient has outpatient neurology appointment.

## 2018-09-21 NOTE — ED ADULT NURSE NOTE - OBJECTIVE STATEMENT
AS PER PT'S AID, PT FAILED PEG TUBE PATENCY TEST. PEG TUBE UNABLE TO FLUSH. PT ALSO HAS BEEN C/O OF B/L LE NUMBNESS WHICH NEURO WILL F/U WITH.

## 2018-10-10 ENCOUNTER — APPOINTMENT (OUTPATIENT)
Dept: UROLOGY | Facility: CLINIC | Age: 63
End: 2018-10-10
Payer: MEDICARE

## 2018-10-10 PROBLEM — M81.0 AGE-RELATED OSTEOPOROSIS WITHOUT CURRENT PATHOLOGICAL FRACTURE: Chronic | Status: ACTIVE | Noted: 2018-09-21

## 2018-10-10 PROBLEM — G82.50 QUADRIPLEGIA, UNSPECIFIED: Chronic | Status: ACTIVE | Noted: 2018-09-21

## 2018-10-10 PROCEDURE — 51703 INSERT BLADDER CATH COMPLEX: CPT

## 2018-11-01 ENCOUNTER — EMERGENCY (EMERGENCY)
Facility: HOSPITAL | Age: 63
LOS: 0 days | Discharge: HOME | End: 2018-11-01
Attending: EMERGENCY MEDICINE | Admitting: EMERGENCY MEDICINE

## 2018-11-01 VITALS
SYSTOLIC BLOOD PRESSURE: 97 MMHG | OXYGEN SATURATION: 97 % | HEART RATE: 62 BPM | DIASTOLIC BLOOD PRESSURE: 44 MMHG | RESPIRATION RATE: 18 BRPM

## 2018-11-01 VITALS — TEMPERATURE: 98 F

## 2018-11-01 DIAGNOSIS — Z79.899 OTHER LONG TERM (CURRENT) DRUG THERAPY: ICD-10-CM

## 2018-11-01 DIAGNOSIS — N40.0 BENIGN PROSTATIC HYPERPLASIA WITHOUT LOWER URINARY TRACT SYMPTOMS: ICD-10-CM

## 2018-11-01 DIAGNOSIS — J45.20 MILD INTERMITTENT ASTHMA, UNCOMPLICATED: ICD-10-CM

## 2018-11-01 DIAGNOSIS — Z98.890 OTHER SPECIFIED POSTPROCEDURAL STATES: ICD-10-CM

## 2018-11-01 DIAGNOSIS — Z79.2 LONG TERM (CURRENT) USE OF ANTIBIOTICS: ICD-10-CM

## 2018-11-01 DIAGNOSIS — R05 COUGH: ICD-10-CM

## 2018-11-01 DIAGNOSIS — Z87.01 PERSONAL HISTORY OF PNEUMONIA (RECURRENT): ICD-10-CM

## 2018-11-01 DIAGNOSIS — Z98.890 OTHER SPECIFIED POSTPROCEDURAL STATES: Chronic | ICD-10-CM

## 2018-11-01 DIAGNOSIS — Z93.1 GASTROSTOMY STATUS: Chronic | ICD-10-CM

## 2018-11-01 DIAGNOSIS — R50.9 FEVER, UNSPECIFIED: ICD-10-CM

## 2018-11-01 DIAGNOSIS — G40.909 EPILEPSY, UNSPECIFIED, NOT INTRACTABLE, WITHOUT STATUS EPILEPTICUS: ICD-10-CM

## 2018-11-01 DIAGNOSIS — N39.0 URINARY TRACT INFECTION, SITE NOT SPECIFIED: ICD-10-CM

## 2018-11-01 DIAGNOSIS — Z87.39 PERSONAL HISTORY OF OTHER DISEASES OF THE MUSCULOSKELETAL SYSTEM AND CONNECTIVE TISSUE: ICD-10-CM

## 2018-11-01 RX ORDER — CIPROFLOXACIN LACTATE 400MG/40ML
1 VIAL (ML) INTRAVENOUS
Qty: 7 | Refills: 0 | OUTPATIENT
Start: 2018-11-01 | End: 2018-11-07

## 2018-11-01 RX ORDER — ALBUTEROL 90 UG/1
2.5 AEROSOL, METERED ORAL ONCE
Qty: 0 | Refills: 0 | Status: COMPLETED | OUTPATIENT
Start: 2018-11-01 | End: 2018-11-01

## 2018-11-01 RX ADMIN — ALBUTEROL 2.5 MILLIGRAM(S): 90 AEROSOL, METERED ORAL at 12:33

## 2018-11-01 RX ADMIN — Medication 200 MILLIGRAM(S): at 12:33

## 2018-11-01 NOTE — ED ADULT NURSE NOTE - NSIMPLEMENTINTERV_GEN_ALL_ED
Implemented All Fall with Harm Risk Interventions:  [place] to call system. Call bell, personal items and telephone within reach. Instruct patient to call for assistance. Room bathroom lighting operational. Non-slip footwear when patient is off stretcher. Physically safe environment: no spills, clutter or unnecessary equipment. Stretcher in lowest position, wheels locked, appropriate side rails in place. Provide visual cue, wrist band, yellow gown, etc. Monitor gait and stability. Monitor for mental status changes and reorient to person, place, and time. Review medications for side effects contributing to fall risk. Reinforce activity limits and safety measures with patient and family. Provide visual clues: red socks.

## 2018-11-01 NOTE — ED ADULT NURSE NOTE - OBJECTIVE STATEMENT
The patient is a 63y Male complaining of fever, right leg pain, numbness in hands and feet. Patient denies any abdominal pain, nausea or vomiting. Patient denies any chest pain or shortness of breath. Patient came in from group home with peg tube in place receiving peg feed. The patient is a 63y Male complaining of cough, fever, right leg pain, numbness in hands and feet. Patient denies any abdominal pain, nausea or vomiting. Patient denies any chest pain or shortness of breath. Patient came in from group home with peg tube in place receiving peg feed.

## 2018-11-01 NOTE — ED PROVIDER NOTE - OBJECTIVE STATEMENT
63yM with PMH CP, mild developmental delay, spastic quadriplegia, seizure disorder, osteoporosis, PNA, mild intermittent asthma, s/p PEG with indwelling urinary catheter p/w cough and subjective fever since 2am last night. Tmax 98 at group home. Cough is nonproductive. no sore throat, congestion, ear pain, cp, sob. no abd pain, nvd constipation. patient does endorse mild intermittent dysuria but no hematuria. no back pain.

## 2018-11-01 NOTE — ED PROVIDER NOTE - MEDICAL DECISION MAKING DETAILS
62 y/o M PMH CP, mild developmental delay, spastic quadriplegia, seizure disorder, osteoporosis, PNA, mild intermittent asthma, s/p PEG with indwelling urinary catheter p/w cough and subjective fever since 2am last night. Tmax 98 at group home. Cough is nonproductive. no sore throat, congestion, ear pain, cp, sob. no abd pain, nvd constipation. patient does endorse mild intermittent dysuria but no hematuria. no back pain. Patient non-toxic appearing and tolerating PO.  VS reviewed. CXR clear.  Patient afebrile here.  Nurse from home spoken to.  Plan made to give an antibiotic for lung and urine cross coverage as patient has a UTI and respiratory symptoms and is higher risk for aspiration.    Full DC instructions discussed with home.  Patient has proper follow up.

## 2018-11-01 NOTE — ED ADULT NURSE NOTE - NURSING MUSC JOINTS
yes (describe)/patient contracted, unable to ambulate yes (describe)/patient contracted, unable to ambulate, history of spastic quadriplegia

## 2018-11-01 NOTE — ED PROVIDER NOTE - ATTENDING CONTRIBUTION TO CARE
I personally evaluated patient. I agree with the findings and plan with all documentation on chart except as documented  in my note.    62 y/o M PMH CP, mild developmental delay, spastic quadriplegia, seizure disorder, osteoporosis, PNA, mild intermittent asthma, s/p PEG with indwelling urinary catheter p/w cough and subjective fever since 2am last night. Tmax 98 at group home. Cough is nonproductive. no sore throat, congestion, ear pain, cp, sob. no abd pain, nvd constipation. patient does endorse mild intermittent dysuria but no hematuria. no back pain. Patient non-toxic appearing and tolerating PO.  VS reviewed. CXR clear.  Patient afebrile here.  Nurse from home spoken to.  Plan made to give an antibiotic for lung and urine cross coverage as patient has a UTI and respiratory symptoms and is higher risk for aspiration.    Full DC instructions discussed with home.  Patient has proper follow up.

## 2018-11-01 NOTE — ED PROVIDER NOTE - CARE PLAN
Principal Discharge DX:	Urinary tract infection Principal Discharge DX:	Urinary tract infection  Secondary Diagnosis:	Cough

## 2018-11-01 NOTE — ED ADULT NURSE NOTE - PMH
BPH (benign prostatic hyperplasia)    Cerebral palsy    Osteoporosis    Seizure    Spastic quadriplegia    Urinary calculi    Urinary retention Asthma    BPH (benign prostatic hyperplasia)    Cerebral palsy    Osteoporosis    Seizure    Spastic quadriplegia    Urinary calculi    Urinary retention

## 2018-11-01 NOTE — ED PROVIDER NOTE - PHYSICAL EXAMINATION
Vital Signs: I have reviewed the initial vital signs.  Constitutional: NAD, well-nourished, appears stated age, no acute distress.  HEENT: Airway patent, moist MM, no erythema/swelling/deformity of oral structures. EOMI, PERRLA.  CV: regular rate, regular rhythm, well-perfused extremities, 2+ b/l DP and radial pulses equal.  Lungs: mild rhonchi B/L  ABD: NTND, no guarding or rebound, no pulsatile mass, no hernias. PEG tube clean and in place.  : alatorre in place. yellow urine in bag  MSK: Neck supple, nontender, nl ROM, no stepoff. Chest nontender. Back nontender in TLS spine or to b/l bony structures or flanks. Ext nontender, nl rom, no deformity.   INTEG: Skin warm, dry, no rash.  NEURO: A&Ox3, moving all extremities, slurred speech  PSYCH: Calm, cooperative, normal affect and interaction.

## 2018-11-01 NOTE — ED PROVIDER NOTE - PMH
Asthma    BPH (benign prostatic hyperplasia)    Cerebral palsy    Osteoporosis    Seizure    Spastic quadriplegia    Urinary calculi    Urinary retention

## 2018-11-05 ENCOUNTER — APPOINTMENT (OUTPATIENT)
Dept: UROLOGY | Facility: CLINIC | Age: 63
End: 2018-11-05
Payer: MEDICARE

## 2018-11-09 PROBLEM — N20.9 URINARY CALCULUS, UNSPECIFIED: Chronic | Status: ACTIVE | Noted: 2018-11-01

## 2018-11-09 PROBLEM — J45.909 UNSPECIFIED ASTHMA, UNCOMPLICATED: Chronic | Status: ACTIVE | Noted: 2018-11-01

## 2018-11-09 PROBLEM — R33.9 RETENTION OF URINE, UNSPECIFIED: Chronic | Status: ACTIVE | Noted: 2018-11-01

## 2018-11-12 ENCOUNTER — APPOINTMENT (OUTPATIENT)
Dept: UROLOGY | Facility: CLINIC | Age: 63
End: 2018-11-12

## 2018-11-13 ENCOUNTER — APPOINTMENT (OUTPATIENT)
Dept: UROLOGY | Facility: CLINIC | Age: 63
End: 2018-11-13
Payer: MEDICARE

## 2018-11-13 PROCEDURE — 51702 INSERT TEMP BLADDER CATH: CPT

## 2018-11-29 ENCOUNTER — APPOINTMENT (OUTPATIENT)
Dept: UROLOGY | Facility: CLINIC | Age: 63
End: 2018-11-29
Payer: MEDICARE

## 2018-11-29 PROCEDURE — 51703 INSERT BLADDER CATH COMPLEX: CPT

## 2018-12-13 ENCOUNTER — EMERGENCY (EMERGENCY)
Facility: HOSPITAL | Age: 63
LOS: 0 days | Discharge: HOME | End: 2018-12-13
Attending: EMERGENCY MEDICINE | Admitting: EMERGENCY MEDICINE

## 2018-12-13 VITALS
DIASTOLIC BLOOD PRESSURE: 84 MMHG | TEMPERATURE: 99 F | SYSTOLIC BLOOD PRESSURE: 136 MMHG | OXYGEN SATURATION: 95 % | RESPIRATION RATE: 18 BRPM | HEART RATE: 111 BPM

## 2018-12-13 VITALS — TEMPERATURE: 100 F

## 2018-12-13 DIAGNOSIS — Z93.1 GASTROSTOMY STATUS: Chronic | ICD-10-CM

## 2018-12-13 DIAGNOSIS — R05 COUGH: ICD-10-CM

## 2018-12-13 DIAGNOSIS — Z79.2 LONG TERM (CURRENT) USE OF ANTIBIOTICS: ICD-10-CM

## 2018-12-13 DIAGNOSIS — N40.0 BENIGN PROSTATIC HYPERPLASIA WITHOUT LOWER URINARY TRACT SYMPTOMS: ICD-10-CM

## 2018-12-13 DIAGNOSIS — R10.30 LOWER ABDOMINAL PAIN, UNSPECIFIED: ICD-10-CM

## 2018-12-13 DIAGNOSIS — R33.9 RETENTION OF URINE, UNSPECIFIED: ICD-10-CM

## 2018-12-13 DIAGNOSIS — N39.0 URINARY TRACT INFECTION, SITE NOT SPECIFIED: ICD-10-CM

## 2018-12-13 DIAGNOSIS — Z98.890 OTHER SPECIFIED POSTPROCEDURAL STATES: Chronic | ICD-10-CM

## 2018-12-13 DIAGNOSIS — Z79.899 OTHER LONG TERM (CURRENT) DRUG THERAPY: ICD-10-CM

## 2018-12-13 DIAGNOSIS — J45.909 UNSPECIFIED ASTHMA, UNCOMPLICATED: ICD-10-CM

## 2018-12-13 LAB
ALBUMIN SERPL ELPH-MCNC: 4.1 G/DL — SIGNIFICANT CHANGE UP (ref 3.5–5.2)
ALP SERPL-CCNC: 93 U/L — SIGNIFICANT CHANGE UP (ref 30–115)
ALT FLD-CCNC: 15 U/L — SIGNIFICANT CHANGE UP (ref 0–41)
ANION GAP SERPL CALC-SCNC: 16 MMOL/L — HIGH (ref 7–14)
APPEARANCE UR: ABNORMAL
AST SERPL-CCNC: 19 U/L — SIGNIFICANT CHANGE UP (ref 0–41)
BASOPHILS # BLD AUTO: 0 K/UL — SIGNIFICANT CHANGE UP (ref 0–0.2)
BASOPHILS NFR BLD AUTO: 0 % — SIGNIFICANT CHANGE UP (ref 0–1)
BILIRUB SERPL-MCNC: 0.4 MG/DL — SIGNIFICANT CHANGE UP (ref 0.2–1.2)
BILIRUB UR-MCNC: NEGATIVE — SIGNIFICANT CHANGE UP
BUN SERPL-MCNC: 19 MG/DL — SIGNIFICANT CHANGE UP (ref 10–20)
CALCIUM SERPL-MCNC: 9.7 MG/DL — SIGNIFICANT CHANGE UP (ref 8.5–10.1)
CHLORIDE SERPL-SCNC: 101 MMOL/L — SIGNIFICANT CHANGE UP (ref 98–110)
CO2 SERPL-SCNC: 25 MMOL/L — SIGNIFICANT CHANGE UP (ref 17–32)
COLOR SPEC: YELLOW — SIGNIFICANT CHANGE UP
CREAT SERPL-MCNC: 0.6 MG/DL — LOW (ref 0.7–1.5)
DIFF PNL FLD: ABNORMAL
EOSINOPHIL # BLD AUTO: 0 K/UL — SIGNIFICANT CHANGE UP (ref 0–0.7)
EOSINOPHIL NFR BLD AUTO: 0 % — SIGNIFICANT CHANGE UP (ref 0–8)
GLUCOSE SERPL-MCNC: 155 MG/DL — HIGH (ref 70–99)
GLUCOSE UR QL: NEGATIVE MG/DL — SIGNIFICANT CHANGE UP
HCT VFR BLD CALC: 41.5 % — LOW (ref 42–52)
HGB BLD-MCNC: 14.6 G/DL — SIGNIFICANT CHANGE UP (ref 14–18)
KETONES UR-MCNC: NEGATIVE — SIGNIFICANT CHANGE UP
LEUKOCYTE ESTERASE UR-ACNC: ABNORMAL
LYMPHOCYTES # BLD AUTO: 0.19 K/UL — LOW (ref 1.2–3.4)
LYMPHOCYTES # BLD AUTO: 1.7 % — LOW (ref 20.5–51.1)
MCHC RBC-ENTMCNC: 31.5 PG — HIGH (ref 27–31)
MCHC RBC-ENTMCNC: 35.2 G/DL — SIGNIFICANT CHANGE UP (ref 32–37)
MCV RBC AUTO: 89.6 FL — SIGNIFICANT CHANGE UP (ref 80–94)
MONOCYTES # BLD AUTO: 0.99 K/UL — HIGH (ref 0.1–0.6)
MONOCYTES NFR BLD AUTO: 8.7 % — SIGNIFICANT CHANGE UP (ref 1.7–9.3)
NEUTROPHILS # BLD AUTO: 10.15 K/UL — HIGH (ref 1.4–6.5)
NEUTROPHILS NFR BLD AUTO: 89.6 % — HIGH (ref 42.2–75.2)
NITRITE UR-MCNC: NEGATIVE — SIGNIFICANT CHANGE UP
PH UR: 7.5 — SIGNIFICANT CHANGE UP (ref 5–8)
PLATELET # BLD AUTO: 225 K/UL — SIGNIFICANT CHANGE UP (ref 130–400)
POTASSIUM SERPL-MCNC: 4 MMOL/L — SIGNIFICANT CHANGE UP (ref 3.5–5)
POTASSIUM SERPL-SCNC: 4 MMOL/L — SIGNIFICANT CHANGE UP (ref 3.5–5)
PROT SERPL-MCNC: 7.3 G/DL — SIGNIFICANT CHANGE UP (ref 6–8)
PROT UR-MCNC: 30 MG/DL
RBC # BLD: 4.63 M/UL — LOW (ref 4.7–6.1)
RBC # FLD: 13.2 % — SIGNIFICANT CHANGE UP (ref 11.5–14.5)
SODIUM SERPL-SCNC: 142 MMOL/L — SIGNIFICANT CHANGE UP (ref 135–146)
SP GR SPEC: 1.01 — SIGNIFICANT CHANGE UP (ref 1.01–1.03)
UROBILINOGEN FLD QL: 0.2 MG/DL — SIGNIFICANT CHANGE UP (ref 0.2–0.2)
WBC # BLD: 11.33 K/UL — HIGH (ref 4.8–10.8)
WBC # FLD AUTO: 11.33 K/UL — HIGH (ref 4.8–10.8)

## 2018-12-13 RX ORDER — CIPROFLOXACIN LACTATE 400MG/40ML
1 VIAL (ML) INTRAVENOUS
Qty: 6 | Refills: 0 | OUTPATIENT
Start: 2018-12-13 | End: 2018-12-15

## 2018-12-13 RX ORDER — ACETAMINOPHEN 500 MG
650 TABLET ORAL ONCE
Qty: 0 | Refills: 0 | Status: COMPLETED | OUTPATIENT
Start: 2018-12-13 | End: 2018-12-13

## 2018-12-13 RX ADMIN — Medication 650 MILLIGRAM(S): at 15:20

## 2018-12-13 RX ADMIN — Medication 650 MILLIGRAM(S): at 15:49

## 2018-12-13 NOTE — ED PROVIDER NOTE - ATTENDING CONTRIBUTION TO CARE
64 yo m with pmh of inocencia zaldivar, with indwelling alatorre, presents with c/o lower abd pain.  as per pt and aide, noted small amount of output from the alatorre since yesterday.  was changed by vns last week.  no fever, no chills, no n/v/d.  exam: nad, ncat, perrl, eomi, mmm, rrr, ctab, abd soft, +distended suprapubic area, mildly ttp, no cvat imp; on bedside US, pt's bladder distended despite alatorre balloon in bladder.  nonfunctioning alatorre, will replace.  labs, ua

## 2018-12-13 NOTE — ED PROVIDER NOTE - CARE PLAN
Principal Discharge DX:	Urinary retention  Assessment and plan of treatment:	Treat with ciprofloxacin for 3 days and follow up with urologist Dr Joyner  Secondary Diagnosis:	Urinary tract infection without hematuria, site unspecified

## 2018-12-13 NOTE — ED PROVIDER NOTE - PHYSICAL EXAMINATION
T(F): 99 (12-13-18 @ 13:36), Max: 99 (12-13-18 @ 13:36)  HR: 111 (12-13-18 @ 13:36) (111 - 111)  BP: 136/84 (12-13-18 @ 13:36) (136/84 - 136/84)  RR: 18 (12-13-18 @ 13:36) (18 - 18)  SpO2: 95% (12-13-18 @ 13:36) (95% - 95%)  PHYSICAL EXAM:  GENERAL: Quadriplegic but AAO x3   HEAD:  Atraumatic, Normocephalic. Poor oral hygiene   EYES: EOMI, conjunctiva clear  CHEST/LUNG: Clear to auscultation bilaterally; No wheeze; No crackles; No accessory muscles used  HEART: Regular rate and rhythm; No murmurs;   ABDOMEN: distended with tenderness on palpation of the lower and L lateral abdomen; No guarding  EXTREMITIES:  No cyanosis or edema  PSYCH: AAOx3  NEUROLOGY: quadriplegic   SKIN: No rashes or lesions T(F): 99 (12-13-18 @ 13:36), Max: 99 (12-13-18 @ 13:36)  HR: 111 (12-13-18 @ 13:36) (111 - 111)  BP: 136/84 (12-13-18 @ 13:36) (136/84 - 136/84)  RR: 18 (12-13-18 @ 13:36) (18 - 18)  SpO2: 95% (12-13-18 @ 13:36) (95% - 95%)  PHYSICAL EXAM:  GENERAL: Quadriplegic with contracted upper limbs but AAO x3   HEAD:  Atraumatic, Normocephalic. Poor oral hygiene   EYES: EOMI, conjunctiva clear  CHEST/LUNG: Clear to auscultation bilaterally; No wheeze; No crackles; No accessory muscles used  HEART: Regular rate and rhythm; No murmurs;   ABDOMEN: distended with tenderness on palpation of the lower and L lateral abdomen; No guarding  EXTREMITIES:  No cyanosis or edema  PSYCH: AAOx3  NEUROLOGY: quadriplegic   SKIN: No rashes or lesions

## 2018-12-13 NOTE — ED PROVIDER NOTE - OBJECTIVE STATEMENT
63Y M with pmh of cerebral palsy with alatorre and peg tube and seizures disorder presents to the ED with abdominal distension and lower abdominal pain since yesterday. As per patient he has had the alatorre for many years and it was changed 1 week ago. He denies any fevers or chills. Since yesterday the alatorre has not been draining much urine. 63Y M with pmh of cerebral palsy with alatorre and peg tube and seizures disorder presents to the ED with abdominal distension and lower abdominal pain since yesterday. As per patient he has had the alatorre for many years and it was changed 1 week ago. He denies any fevers or chills. Since yesterday the alatorre has not been draining much urine. Bedside bladder sono showed a distended bladder with over 500cc of urine.

## 2018-12-13 NOTE — ED PROVIDER NOTE - PROGRESS NOTE DETAILS
Edouard was replaced and about 650ml of urine came out. Abdomen is soft now. Edouard was replaced and about 650ml of urine came out. Abdomen is soft now. UA was positive so will discharge patient on ciprofloxacin.

## 2018-12-13 NOTE — ED ADULT NURSE NOTE - OBJECTIVE STATEMENT
Pt brought in to the hospital from [facility] for lower abd distention and catheter complication (decreased urine output). Denies any other symptoms. alatorre catheter changed on december 7th

## 2018-12-13 NOTE — ED ADULT NURSE NOTE - NSIMPLEMENTINTERV_GEN_ALL_ED
Implemented All Fall with Harm Risk Interventions:  [place] to call system. Call bell, personal items and telephone within reach. Instruct patient to call for assistance. Room bathroom lighting operational. Non-slip footwear when patient is off stretcher. Physically safe environment: no spills, clutter or unnecessary equipment. Stretcher in lowest position, wheels locked, appropriate side rails in place. Provide visual cue, wrist band, yellow gown, etc. Monitor gait and stability. Monitor for mental status changes and reorient to person, place, and time. Review medications for side effects contributing to fall risk. Reinforce activity limits and safety measures with patient and family. Provide visual clues: red socks.

## 2018-12-13 NOTE — ED PROVIDER NOTE - CARE PROVIDER_API CALL
Solis Joyner), [facility]  [address] [place] [address]  [address] [place] [id number]  Phone: [phone]  Fax: [phone]

## 2018-12-13 NOTE — ED PROVIDER NOTE - NS ED ROS FT
REVIEW OF SYSTEMS:    CONSTITUTIONAL: No weakness, fevers or chills  EYES/ENT: No visual changes;  No vertigo or throat pain   NECK: No pain or stiffness  RESPIRATORY: chronic cough, no wheezing or hemoptysis; No shortness of breath  CARDIOVASCULAR: No chest pain or palpitations  GASTROINTESTINAL: abdominal pain. No epigastric pain. No nausea, vomiting, or hematemesis; No diarrhea or constipation. No melena or hematochezia.  GENITOURINARY: No dysuria, frequency or hematuria  NEUROLOGICAL: No numbness or weakness  SKIN: No itching, rashes

## 2019-01-02 ENCOUNTER — APPOINTMENT (OUTPATIENT)
Dept: UROLOGY | Facility: CLINIC | Age: 64
End: 2019-01-02

## 2019-01-04 ENCOUNTER — APPOINTMENT (OUTPATIENT)
Dept: UROLOGY | Facility: CLINIC | Age: 64
End: 2019-01-04
Payer: MEDICARE

## 2019-01-04 DIAGNOSIS — Z87.448 PERSONAL HISTORY OF OTHER DISEASES OF URINARY SYSTEM: ICD-10-CM

## 2019-01-04 PROCEDURE — 99213 OFFICE O/P EST LOW 20 MIN: CPT

## 2019-02-06 ENCOUNTER — APPOINTMENT (OUTPATIENT)
Dept: UROLOGY | Facility: CLINIC | Age: 64
End: 2019-02-06

## 2019-02-25 ENCOUNTER — APPOINTMENT (OUTPATIENT)
Dept: UROLOGY | Facility: CLINIC | Age: 64
End: 2019-02-25
Payer: MEDICARE

## 2019-02-25 PROCEDURE — 99214 OFFICE O/P EST MOD 30 MIN: CPT

## 2019-02-26 RX ORDER — FINASTERIDE 5 MG/1
5 TABLET, FILM COATED ORAL DAILY
Qty: 90 | Refills: 3 | Status: DISCONTINUED | COMMUNITY
Start: 2018-02-26 | End: 2019-02-26

## 2019-02-26 RX ORDER — TAMSULOSIN HYDROCHLORIDE 0.4 MG/1
0.4 CAPSULE ORAL
Qty: 30 | Refills: 1 | Status: DISCONTINUED | COMMUNITY
Start: 2017-09-22 | End: 2019-02-26

## 2019-02-26 RX ORDER — AMOXICILLIN AND CLAVULANATE POTASSIUM 875; 125 MG/1; MG/1
875-125 TABLET, COATED ORAL
Qty: 6 | Refills: 0 | Status: DISCONTINUED | COMMUNITY
Start: 2018-04-18 | End: 2019-02-26

## 2019-02-26 RX ORDER — TAMSULOSIN HYDROCHLORIDE 0.4 MG/1
0.4 CAPSULE ORAL
Qty: 30 | Refills: 2 | Status: DISCONTINUED | COMMUNITY
Start: 2017-08-04 | End: 2019-02-26

## 2019-02-26 RX ORDER — FINASTERIDE 5 MG/1
5 TABLET, FILM COATED ORAL DAILY
Qty: 90 | Refills: 3 | Status: DISCONTINUED | COMMUNITY
Start: 2018-01-29 | End: 2019-02-26

## 2019-02-26 NOTE — PHYSICAL EXAM
[General Appearance - Well Developed] : well developed [General Appearance - Well Nourished] : well nourished [Normal Appearance] : normal appearance [Well Groomed] : well groomed [General Appearance - In No Acute Distress] : no acute distress [Abdomen Soft] : soft [Abdomen Tenderness] : non-tender [Costovertebral Angle Tenderness] : no ~M costovertebral angle tenderness [Skin Color & Pigmentation] : normal skin color and pigmentation [Edema] : no peripheral edema [] : no respiratory distress [Respiration, Rhythm And Depth] : normal respiratory rhythm and effort [Exaggerated Use Of Accessory Muscles For Inspiration] : no accessory muscle use [Affect] : the affect was normal [Mood] : the mood was normal [No Focal Deficits] : no focal deficits [No Palpable Adenopathy] : no palpable adenopathy [FreeTextEntry1] : contracted -unable to ambulate

## 2019-02-26 NOTE — HISTORY OF PRESENT ILLNESS
[Urinary Retention] : urinary retention [FreeTextEntry1] : TISH SHAIKH is a 63 year old male with a past medical history of cerebral palsy and urinary retention due to bulbar urethral stricture .  has had issues with frequent obstruction of alatorre catheter so it has been changed on a weekly basis. \par Presents to the office today for a follow up\par s/p placement of SPT at [facility] for clogged catheter and inability to replace alatorre on 2/14/19 -- 14F\par Patient accompanied by aide from home- per aide pt doing ok with the tube\par Per aide and patient, and sister whom I spoke with about two weeks ago patient has an appointment with a specialist in [place] for urethroplasty\par follows with Dr Kleiner for urinary calcification prevention

## 2019-02-26 NOTE — ASSESSMENT
[FreeTextEntry1] : TISH SHAIKH is a 63 year old male with a past medical history of cerebral palsy and urinary retention due to bulbar urethral stricture\par s/p placement of a SPT at [facility] 2/14/19\par \par -irrigate 2x daily\par -refer pt back to interventional radiology at [facility] to dilated tube up to 26french\par -once larger tube in place return to our office 6 weeks post replacement to have tube changed\par -follow up with jeff urologist for urethroplasty

## 2019-03-14 ENCOUNTER — INPATIENT (INPATIENT)
Facility: HOSPITAL | Age: 64
LOS: 1 days | Discharge: HOME | End: 2019-03-16
Attending: INTERNAL MEDICINE | Admitting: INTERNAL MEDICINE
Payer: MEDICARE

## 2019-03-14 VITALS — TEMPERATURE: 98 F | OXYGEN SATURATION: 95 % | HEART RATE: 101 BPM | RESPIRATION RATE: 20 BRPM

## 2019-03-14 DIAGNOSIS — Z98.890 OTHER SPECIFIED POSTPROCEDURAL STATES: Chronic | ICD-10-CM

## 2019-03-14 DIAGNOSIS — Z93.1 GASTROSTOMY STATUS: Chronic | ICD-10-CM

## 2019-03-14 LAB
ANION GAP SERPL CALC-SCNC: 11 MMOL/L — SIGNIFICANT CHANGE UP (ref 7–14)
APTT BLD: 31.9 SEC — SIGNIFICANT CHANGE UP (ref 27–39.2)
BASOPHILS # BLD AUTO: 0.02 K/UL — SIGNIFICANT CHANGE UP (ref 0–0.2)
BASOPHILS NFR BLD AUTO: 0.3 % — SIGNIFICANT CHANGE UP (ref 0–1)
BUN SERPL-MCNC: 20 MG/DL — SIGNIFICANT CHANGE UP (ref 10–20)
CALCIUM SERPL-MCNC: 9.5 MG/DL — SIGNIFICANT CHANGE UP (ref 8.5–10.1)
CHLORIDE SERPL-SCNC: 105 MMOL/L — SIGNIFICANT CHANGE UP (ref 98–110)
CO2 SERPL-SCNC: 27 MMOL/L — SIGNIFICANT CHANGE UP (ref 17–32)
CREAT SERPL-MCNC: 0.6 MG/DL — LOW (ref 0.7–1.5)
EOSINOPHIL # BLD AUTO: 0.49 K/UL — SIGNIFICANT CHANGE UP (ref 0–0.7)
EOSINOPHIL NFR BLD AUTO: 7.2 % — SIGNIFICANT CHANGE UP (ref 0–8)
GLUCOSE SERPL-MCNC: 99 MG/DL — SIGNIFICANT CHANGE UP (ref 70–99)
HCT VFR BLD CALC: 38.6 % — LOW (ref 42–52)
HGB BLD-MCNC: 13 G/DL — LOW (ref 14–18)
IMM GRANULOCYTES NFR BLD AUTO: 0.3 % — SIGNIFICANT CHANGE UP (ref 0.1–0.3)
INR BLD: 1.03 RATIO — SIGNIFICANT CHANGE UP (ref 0.65–1.3)
LYMPHOCYTES # BLD AUTO: 1.39 K/UL — SIGNIFICANT CHANGE UP (ref 1.2–3.4)
LYMPHOCYTES # BLD AUTO: 20.5 % — SIGNIFICANT CHANGE UP (ref 20.5–51.1)
MCHC RBC-ENTMCNC: 31.3 PG — HIGH (ref 27–31)
MCHC RBC-ENTMCNC: 33.7 G/DL — SIGNIFICANT CHANGE UP (ref 32–37)
MCV RBC AUTO: 93 FL — SIGNIFICANT CHANGE UP (ref 80–94)
MONOCYTES # BLD AUTO: 0.69 K/UL — HIGH (ref 0.1–0.6)
MONOCYTES NFR BLD AUTO: 10.2 % — HIGH (ref 1.7–9.3)
NEUTROPHILS # BLD AUTO: 4.16 K/UL — SIGNIFICANT CHANGE UP (ref 1.4–6.5)
NEUTROPHILS NFR BLD AUTO: 61.5 % — SIGNIFICANT CHANGE UP (ref 42.2–75.2)
NRBC # BLD: 0 /100 WBCS — SIGNIFICANT CHANGE UP (ref 0–0)
PLATELET # BLD AUTO: 250 K/UL — SIGNIFICANT CHANGE UP (ref 130–400)
POTASSIUM SERPL-MCNC: 4.7 MMOL/L — SIGNIFICANT CHANGE UP (ref 3.5–5)
POTASSIUM SERPL-SCNC: 4.7 MMOL/L — SIGNIFICANT CHANGE UP (ref 3.5–5)
PROTHROM AB SERPL-ACNC: 11.8 SEC — SIGNIFICANT CHANGE UP (ref 9.95–12.87)
RBC # BLD: 4.15 M/UL — LOW (ref 4.7–6.1)
RBC # FLD: 13.1 % — SIGNIFICANT CHANGE UP (ref 11.5–14.5)
SODIUM SERPL-SCNC: 143 MMOL/L — SIGNIFICANT CHANGE UP (ref 135–146)
WBC # BLD: 6.77 K/UL — SIGNIFICANT CHANGE UP (ref 4.8–10.8)
WBC # FLD AUTO: 6.77 K/UL — SIGNIFICANT CHANGE UP (ref 4.8–10.8)

## 2019-03-14 RX ORDER — OXYBUTYNIN CHLORIDE 5 MG
10 TABLET ORAL DAILY
Qty: 0 | Refills: 0 | Status: DISCONTINUED | OUTPATIENT
Start: 2019-03-14 | End: 2019-03-16

## 2019-03-14 RX ORDER — ALBUTEROL 90 UG/1
0 AEROSOL, METERED ORAL
Qty: 0 | Refills: 0 | COMMUNITY

## 2019-03-14 RX ORDER — FINASTERIDE 5 MG/1
5 TABLET, FILM COATED ORAL DAILY
Qty: 0 | Refills: 0 | Status: DISCONTINUED | OUTPATIENT
Start: 2019-03-14 | End: 2019-03-16

## 2019-03-14 RX ORDER — PANTOPRAZOLE SODIUM 20 MG/1
40 TABLET, DELAYED RELEASE ORAL
Qty: 0 | Refills: 0 | Status: DISCONTINUED | OUTPATIENT
Start: 2019-03-14 | End: 2019-03-16

## 2019-03-14 RX ORDER — CALCIUM GLUCONATE 100 MG/ML
0 VIAL (ML) INTRAVENOUS
Qty: 0 | Refills: 0 | COMMUNITY

## 2019-03-14 RX ORDER — CHLORHEXIDINE GLUCONATE 213 G/1000ML
1 SOLUTION TOPICAL
Qty: 0 | Refills: 0 | Status: DISCONTINUED | OUTPATIENT
Start: 2019-03-14 | End: 2019-03-16

## 2019-03-14 RX ORDER — TAMSULOSIN HYDROCHLORIDE 0.4 MG/1
0.4 CAPSULE ORAL AT BEDTIME
Qty: 0 | Refills: 0 | Status: DISCONTINUED | OUTPATIENT
Start: 2019-03-14 | End: 2019-03-16

## 2019-03-14 RX ORDER — ENOXAPARIN SODIUM 100 MG/ML
40 INJECTION SUBCUTANEOUS DAILY
Qty: 0 | Refills: 0 | Status: DISCONTINUED | OUTPATIENT
Start: 2019-03-14 | End: 2019-03-16

## 2019-03-14 RX ORDER — PHENOBARBITAL 60 MG
64.8 TABLET ORAL DAILY
Qty: 0 | Refills: 0 | Status: DISCONTINUED | OUTPATIENT
Start: 2019-03-14 | End: 2019-03-16

## 2019-03-14 RX ORDER — TAMSULOSIN HYDROCHLORIDE 0.4 MG/1
0 CAPSULE ORAL
Qty: 0 | Refills: 0 | COMMUNITY

## 2019-03-14 RX ORDER — ALBUTEROL 90 UG/1
2.5 AEROSOL, METERED ORAL EVERY 6 HOURS
Qty: 0 | Refills: 0 | Status: DISCONTINUED | OUTPATIENT
Start: 2019-03-14 | End: 2019-03-16

## 2019-03-14 RX ORDER — BACLOFEN 100 %
0 POWDER (GRAM) MISCELLANEOUS
Qty: 0 | Refills: 0 | COMMUNITY

## 2019-03-14 RX ORDER — BACLOFEN 100 %
10 POWDER (GRAM) MISCELLANEOUS THREE TIMES A DAY
Qty: 0 | Refills: 0 | Status: DISCONTINUED | OUTPATIENT
Start: 2019-03-14 | End: 2019-03-16

## 2019-03-14 RX ORDER — SENNA PLUS 8.6 MG/1
10 TABLET ORAL
Qty: 0 | Refills: 0 | Status: DISCONTINUED | OUTPATIENT
Start: 2019-03-14 | End: 2019-03-15

## 2019-03-14 RX ORDER — LORATADINE 10 MG/1
0 TABLET ORAL
Qty: 0 | Refills: 0 | COMMUNITY

## 2019-03-14 RX ORDER — LACTULOSE 10 G/15ML
15 SOLUTION ORAL
Qty: 0 | Refills: 0 | Status: DISCONTINUED | OUTPATIENT
Start: 2019-03-14 | End: 2019-03-16

## 2019-03-14 RX ORDER — DOCUSATE SODIUM 100 MG
0 CAPSULE ORAL
Qty: 0 | Refills: 0 | COMMUNITY

## 2019-03-14 NOTE — ED ADULT NURSE NOTE - NSIMPLEMENTINTERV_GEN_ALL_ED
Implemented All Fall with Harm Risk Interventions:  [place] to call system. Call bell, personal items and telephone within reach. Instruct patient to call for assistance. Room bathroom lighting operational. Non-slip footwear when patient is off stretcher. Physically safe environment: no spills, clutter or unnecessary equipment. Stretcher in lowest position, wheels locked, appropriate side rails in place. Provide visual cue, wrist band, yellow gown, etc. Monitor gait and stability. Monitor for mental status changes and reorient to person, place, and time. Review medications for side effects contributing to fall risk. Reinforce activity limits and safety measures with patient and family. Provide visual clues: red socks.

## 2019-03-14 NOTE — ED PROVIDER NOTE - PROGRESS NOTE DETAILS
Case was d/w urology PA, who will evaluate the patient in ED. Patient was seen by urology Tyra HUNG, patient is to be admitted  for replacement of the catheter by IR tomorrow.  As per Tyra. IR attending is already aware.

## 2019-03-14 NOTE — H&P ADULT - NSHPSOCIALHISTORY_GEN_ALL_CORE
Social History:    Lives with: (  ) alone  (  ) children   (  ) spouse   (  ) parents  ( X ) other: NH    Substance Use (street drugs): ( X ) never used  (  ) other:  Tobacco Usage:  ( X  ) never smoked   (   ) former smoker   (   ) current smoker  (     ) pack year  (        ) last cigarette date  Alcohol Usage: Negative

## 2019-03-14 NOTE — ED ADULT NURSE NOTE - OBJECTIVE STATEMENT
Per nursing home patients suprapubic catheter has been leaking and urology recommended them to have it changed to a 26 Y[place].

## 2019-03-14 NOTE — H&P ADULT - HISTORY OF PRESENT ILLNESS
62 yo male patient with PMHX of CP, seizures, and urethral stricture s/p suprapubic catheter placement, was sent from NH because of leaking urine from around his suprapubic cath.  His urologist was consulted and requested sending him to ED for an IR procedure (changing alatorre to a bigger size one).  Patient has no complaints. No fever, no chest or abdominal pain, no SOB, no diarrhea.  IR were consulted and patient was added to the schedule for tomorrow.

## 2019-03-14 NOTE — ED ADULT NURSE NOTE - NSFALLRSKASSISTTYPE_ED_ALL_ED
Patient's pain and swelling have gotten worse.  I advised him to to come to the [facility] ER for further evaluation.   Standing/Toileting/Walking

## 2019-03-14 NOTE — H&P ADULT - NSICDXPASTSURGICALHX_GEN_ALL_CORE_FT
PAST SURGICAL HISTORY:  H/O cystoscopy     S/P percutaneous endoscopic gastrostomy (PEG) tube placement

## 2019-03-14 NOTE — H&P ADULT - NSHPPHYSICALEXAM_GEN_ALL_CORE
PHYSICAL EXAM:    T(F): 98, Max: 98 (03-14-19 @ 23:03)  HR: 75  BP: 105/62  RR: 18  SpO2: 94%    GENERAL: contracted, not in acute distress  HEAD:  Atraumatic  EYES: well injected conjunctiva  NECK: Supple  CHEST/LUNG: Clear to auscultation bilaterally; No wheeze  HEART: Regular rate and rhythm; No murmurs  ABDOMEN: Soft, Nontender, Nondistended; Bowel sounds present; PEG tube in place  EXTREMITIES:  1+ Peripheral Pulses, No clubbing or edema  PSYCH: AAOx3  NEUROLOGY: non-focal

## 2019-03-14 NOTE — CONSULT NOTE ADULT - ASSESSMENT
Pt with 14 fr SPT placed 4 weeks ago in [facility], pt needs spt exchanged for bigger size (26fr)    Dr. Joyner discussed case with Dr. Sims from IR, pt will be added on the schedule for 3/15 for spt exchange and upsizing    plan  -admit to medicine  -NPO after MN for IR porcedure      case d/w Dr. Gant

## 2019-03-14 NOTE — H&P ADULT - ASSESSMENT
62 yo male patient with PMHX of CP, seizures, and urethral stricture s/p suprapubic catheter placement, was sent from NH because of leaking urine from around his suprapubic cath, evaluated by urology and planned for suprapubic cath by IR tomorrow.    #) Leaking suprapubic cath  Change of suprapubic cath with IR tomorrow  NPO after midnight  Urology spoke with IR and patient added to the schedule  Check baseline blood work and coags  Continue with flomax, fenasteride, oxybutynin    #) Seizures disorder  continue with phenobarb    #) Chronic constipation  Bedridden  continue with senna, lactulose as needed    #) Miscellaneous  DVT and GI ppx  Resume diet after procedure (Jevity 1.2 250cc 4 times a day)  Out of bed with Dahlia lift  Full code  Dispo back to SNF    #)

## 2019-03-14 NOTE — ED PROVIDER NOTE - PHYSICAL EXAMINATION
Vital Signs: I have reviewed the initial vital signs.  Constitutional: well-nourished, appears stated age, no acute distress  HEENT: PERRL, pink conjunctivae, dry oral mucosa, brown teeth  Cardiovascular: regular rate, regular rhythm, well-perfused extremities, distal pulses intact  Respiratory: unlabored respiratory effort, clear to auscultation bilaterally, equal air entry  Gastrointestinal: soft, non-tender abdomen, PEG tube in place without leakage or abdominal wall erythema, suprapubic cath cath in place, suture is out, no skin erythema or drainage,  Edouard bag is filled with clear yellow urine  Musculoskeletal: supple neck, no lower extremity edema  Integumentary: warm, dry, no rash  Neurologic: awake, alert, follows simple directions, spastic extremities

## 2019-03-14 NOTE — H&P ADULT - NSICDXPASTMEDICALHX_GEN_ALL_CORE_FT
PAST MEDICAL HISTORY:  Asthma     BPH (benign prostatic hyperplasia)     Cerebral palsy     Osteoporosis     Seizure     Spastic quadriplegia     Urinary calculi     Urinary retention

## 2019-03-14 NOTE — H&P ADULT - ATTENDING COMMENTS
62 yo male patient with PMHX of CP, seizures, and urethral stricture s/p suprapubic catheter placement, was sent from NH because of leaking urine from around his suprapubic cath.  His urologist was consulted and requested sending him to ED for an IR procedure (changing alatorre to a bigger size one).  Patient has no complaints. No fever, no chest or abdominal pain, no SOB, no diarrhea.  IR were consulted and patient was added to the schedule for tomorrow.    REVIEW OF SYSTEMS: see cc/HPI  CONSTITUTIONAL: No weakness, fevers or chills  EYES/ENT: No visual changes;  No vertigo or throat pain   NECK: No pain or stiffness  RESPIRATORY: No cough, wheezing, hemoptysis; No shortness of breath  CARDIOVASCULAR: No chest pain or palpitations  GASTROINTESTINAL: No abdominal or epigastric pain. No nausea, vomiting, or hematemesis; No diarrhea or constipation. No melena or hematochezia.  GENITOURINARY: No dysuria, frequency or hematuria  NEUROLOGICAL: No numbness or weakness  SKIN: No itching, rashes    Physical Exam:  General: WN/WD NAD, CP /spastic in bed  Neurology: A&Ox3, nonfocal, follows commands  Eyes: PERRLA/ EOMI  ENT/Neck: Neck supple, trachea midline, No JVD  Respiratory: CTA B/L, No wheezing, rales, rhonchi  CV: Normal rate regular rhythm, S1S2, no murmurs, rubs or gallops  Abdominal: Soft, NT, ND +BS, (+) suprapubic cath w/ small amount of urine leakage  Extremities: No edema, + peripheral pulses, contracted - spastic quadriplegic  Skin: No Rashes, Hematoma, Ecchymosis  Incisions: n/a  Tubes:n/a 62 yo male patient with PMHX of CP, seizures, and urethral stricture s/p suprapubic catheter placement, was sent from NH because of leaking urine from around his suprapubic cath.  His urologist was consulted and requested sending him to ED for an IR procedure (changing alatorre to a bigger size one).  Patient has no complaints. No fever, no chest or abdominal pain, no SOB, no diarrhea.  IR were consulted and patient was added to the schedule for tomorrow.    REVIEW OF SYSTEMS: see cc/HPI  CONSTITUTIONAL: No weakness, fevers or chills  EYES/ENT: No visual changes;  No vertigo or throat pain   NECK: No pain or stiffness  RESPIRATORY: No cough, wheezing, hemoptysis; No shortness of breath  CARDIOVASCULAR: No chest pain or palpitations  GASTROINTESTINAL: No abdominal or epigastric pain. No nausea, vomiting, or hematemesis; No diarrhea or constipation. No melena or hematochezia.  GENITOURINARY: No dysuria, frequency or hematuria, (+) leaking suprapubic cath  NEUROLOGICAL: No numbness or weakness  SKIN: No itching, rashes    Physical Exam:  General: WN/WD NAD, CP /spastic in bed  Neurology: A&Ox3, nonfocal, follows commands  Eyes: PERRLA/ EOMI  ENT/Neck: Neck supple, trachea midline, No JVD  Respiratory: CTA B/L, No wheezing, rales, rhonchi  CV: Normal rate regular rhythm, S1S2, no murmurs, rubs or gallops  Abdominal: Soft, NT, ND +BS, (+) suprapubic cath w/ small amount of urine leakage  Extremities: No edema, + peripheral pulses, contracted - spastic quadriplegic  Skin: No Rashes, Hematoma, Ecchymosis  Incisions: n/a  Tubes:n/a    A/p   Leaking suprapubic cath (placed for Urethral stricture)  NPO for IR in AM  -agree w/ coags    CP/Seizure disorder   -1:1 observation   -continue w/ outpatient Rx    Asthma -stable   -PRN MDI    Constipation   -c/w Senna and Lactulose    DVT prophylaxis

## 2019-03-14 NOTE — ED PROVIDER NOTE - OBJECTIVE STATEMENT
62 yo male h/o CP, seizures, BPH, asthma, spastic quadriplegia, sent from [facility] for evaluation of  leaking suprapubic catheter.  Patient has g/o urethral strictures and 14 Fr SP catheter was placed on 2/14.  So far no complications until today.  No additional complaints.  Will contact urology regarding catheter change/care.  Patient unable to provide history due to existing illness, but there are no reports of fever, cough, vomiting, change in baseline mental status or change in urination.

## 2019-03-15 PROCEDURE — 93010 ELECTROCARDIOGRAM REPORT: CPT

## 2019-03-15 RX ORDER — SENNA PLUS 8.6 MG/1
1 TABLET ORAL
Qty: 0 | Refills: 0 | Status: DISCONTINUED | OUTPATIENT
Start: 2019-03-15 | End: 2019-03-16

## 2019-03-15 RX ADMIN — PANTOPRAZOLE SODIUM 40 MILLIGRAM(S): 20 TABLET, DELAYED RELEASE ORAL at 06:33

## 2019-03-15 RX ADMIN — TAMSULOSIN HYDROCHLORIDE 0.4 MILLIGRAM(S): 0.4 CAPSULE ORAL at 21:34

## 2019-03-15 RX ADMIN — CHLORHEXIDINE GLUCONATE 1 APPLICATION(S): 213 SOLUTION TOPICAL at 06:32

## 2019-03-15 RX ADMIN — Medication 10 MILLIGRAM(S): at 21:34

## 2019-03-15 RX ADMIN — ALBUTEROL 2.5 MILLIGRAM(S): 90 AEROSOL, METERED ORAL at 07:46

## 2019-03-15 RX ADMIN — SENNA PLUS 1 TABLET(S): 8.6 TABLET ORAL at 19:22

## 2019-03-15 RX ADMIN — ALBUTEROL 2.5 MILLIGRAM(S): 90 AEROSOL, METERED ORAL at 20:47

## 2019-03-15 RX ADMIN — Medication 10 MILLIGRAM(S): at 06:33

## 2019-03-15 NOTE — PROGRESS NOTE ADULT - SUBJECTIVE AND OBJECTIVE BOX
Vascular & Interventional Radiology Pre-Procedure Note    Procedure Name: suprapubic catheter upsize     HPI: HPI:  62 yo male patient with PMHX of CP, seizures, and urethral stricture s/p suprapubic catheter placement, was sent from NH because of leaking urine from around his suprapubic cath.  His urologist was consulted and requested sending him to ED for an IR procedure (changing alatorre to a bigger size one).  Patient has no complaints. No fever, no chest or abdominal pain, no SOB, no diarrhea.  IR were consulted and patient was added to the schedule for tomorrow. (14 Mar 2019 22:59)      Allergies:   Medications (Abx/Cardiac/Anticoagulation/Blood Products)      Data:  149.86  53  T(C): 36.2  HR: 77  BP: 122/57  RR: 18  SpO2: 94%    Exam  General: NAD, AAO x3, no distress  Chest: breathing comfortably on room air, CTAB  Abdomen: soft, non-tender, non- distended, PEG tube in place  Extremities: positive pulses bilaterally x4    Radiology & Additional Studies: Radiology imaging reviewed.     Labs:   -WBC 6.77 / HgB 13.0 / Hct 38.6 / Plt 250  -Na 143 / Cl 105 / BUN 20 / Glucose 99  -K 4.7 / CO2 27 / Cr 0.6  -ALT -- / Alk Phos -- / T.Bili --  -INR1.03      EKG completed (date): none documented, no cardiac history    Height in cm: 149.86 (15 Mar 2019 03:57)    Weight: 53kG    Consentable: [ ] Yes   [ x ] No     Leslie Chaparro (sister): [phone]    Plan:   -63y Male presents for suprapubic catheter upsize today 3/15  -Risks/Benefits/alternatives explained with the patient and/or healthcare proxy and witnessed informed consent obtained.

## 2019-03-15 NOTE — CONSULT NOTE ADULT - SUBJECTIVE AND OBJECTIVE BOX
Pt well known to  service, pt has chronic indwelling [place], but due to bladder neck stricture had SPT placed at [facility] 4 weeks ago. Pt presents form group home due to SPT leaking x 1 day. SPT stitch is pulled form skin, but bc pt has 14 fr pigtail catheter, it is in place, it irrigates with ease and drains w/o issue. Case d/w Dr. Joyner, he would like SPT to be upsized by IR. He discussed case with Dr. Sims and pt will be added on to the schedule for SPT exchange and upsizing tomorrow    PAST MEDICAL & SURGICAL HISTORY:  Asthma  Urinary retention  Urinary calculi  Spastic quadriplegia  Osteoporosis  Seizure  Cerebral palsy  BPH (benign prostatic hyperplasia)  H/O cystoscopy  S/P percutaneous endoscopic gastrostomy (PEG) tube placement    Home Medications:  baclofen:  (13 Dec 2018 15:02)  calcium gluconate:  (13 Dec 2018 15:02)  Colace:  (13 Dec 2018 15:02)  finasteride 5 mg oral tablet: 1 tab(s) orally once a day (13 Dec 2018 15:02)  lactulose:  (13 Dec 2018 15:02)  loratadine:  (13 Dec 2018 15:02)  PHENobarbital 32.4 mg oral tablet: 2 tab(s) orally once a day (13 Dec 2018 15:02)  promethazine 6.25 mg/5 mL oral syrup: 5 milliliter(s) orally 3 times a day (13 Dec 2018 15:02)  Robafen 100 mg/5 mL oral liquid:  (13 Dec 2018 15:02)  tamsulosin:  (13 Dec 2018 15:02)  tamsulosin 0.4 mg oral capsule: 1 cap(s) orally once a day (13 Dec 2018 15:02)  Ventolin:  (13 Dec 2018 15:02)    Allergies    No Known Allergies    Intolerances    SHx - group home resident    FHx - NC    Vital Signs Last 24 Hrs  T(C): 36.4 (14 Mar 2019 17:21), Max: 36.4 (14 Mar 2019 17:21)  T(F): 97.5 (14 Mar 2019 17:21), Max: 97.5 (14 Mar 2019 17:21)  HR: 96 (14 Mar 2019 17:34) (96 - 101)  BP: 100/55 (14 Mar 2019 17:34) (100/55 - 100/55)  RR: 20 (14 Mar 2019 17:21) (20 - 20)  SpO2: 95% (14 Mar 2019 17:21) (95% - 95%)    pt seen at bedside  a+ox2, nad, non toxic  abd  - soft, nd, nttp, + umbilical hernia, +spt 14 fr pigtail, anchor stitch ripped, pigtail still in place, irrigated with 60cc ns, irrigates w/o issue, drains into leg bag w/o issue, urine with sediment
INTERVENTIONAL RADIOLOGY CONSULT:     Procedure Requested: suprapubic catheter upsize     HPI:  62 yo male patient with PMHX of CP, seizures, and urethral stricture s/p suprapubic catheter placement, was sent from NH because of leaking urine from around his suprapubic cath.  His urologist was consulted and requested sending him to ED for an IR procedure (changing alatorre to a bigger size one).  Patient has no complaints. No fever, no chest or abdominal pain, no SOB, no diarrhea.  IR were consulted and patient was added to the schedule for tomorrow. (14 Mar 2019 22:59)      PAST MEDICAL & SURGICAL HISTORY:  Asthma  Urinary retention  Urinary calculi  Spastic quadriplegia  Osteoporosis  Seizure  Cerebral palsy  BPH (benign prostatic hyperplasia)  H/O cystoscopy  S/P percutaneous endoscopic gastrostomy (PEG) tube placement      MEDICATIONS  (STANDING):  ALBUTerol    0.083% 2.5 milliGRAM(s) Nebulizer every 6 hours  baclofen 10 milliGRAM(s) Oral three times a day  chlorhexidine 4% Liquid 1 Application(s) Topical <User Schedule>  enoxaparin Injectable 40 milliGRAM(s) SubCutaneous daily  finasteride 5 milliGRAM(s) Oral daily  oxybutynin 10 milliGRAM(s) Oral daily  pantoprazole   Suspension 40 milliGRAM(s) Enteral Tube before breakfast  PHENobarbital 64.8 milliGRAM(s) Oral daily  senna 1 Tablet(s) Oral two times a day  tamsulosin 0.4 milliGRAM(s) Oral at bedtime    MEDICATIONS  (PRN):  lactulose Syrup 15 Gram(s) Enteral Tube two times a day PRN constipation      Allergies    No Known Allergies    Intolerances        Social History:   Smoking: Yes [ ]  No [ ]   ______pk yrs  ETOH  Yes [ ]  No [ ]  Social [ ]  DRUGS:  Yes [ ]  No [ ]  if so what______________    FAMILY HISTORY:  Family history unknown      Physical Exam:   Vital Signs Last 24 Hrs  T(C): 36.2 (15 Mar 2019 05:13), Max: 36.7 (14 Mar 2019 23:03)  T(F): 97.1 (15 Mar 2019 05:13), Max: 98 (14 Mar 2019 23:03)  HR: 77 (15 Mar 2019 05:13) (75 - 101)  BP: 122/57 (15 Mar 2019 05:13) (100/55 - 122/57)  BP(mean): --  RR: 18 (15 Mar 2019 05:13) (18 - 20)  SpO2: 94% (15 Mar 2019 03:57) (94% - 95%)      Labs:                         13.0   6.77  )-----------( 250      ( 14 Mar 2019 22:40 )             38.6     03-14    143  |  105  |  20  ----------------------------<  99  4.7   |  27  |  0.6<L>    Ca    9.5      14 Mar 2019 22:40      PT/INR - ( 14 Mar 2019 22:40 )   PT: 11.80 sec;   INR: 1.03 ratio         PTT - ( 14 Mar 2019 22:40 )  PTT:31.9 sec    Pertinent labs:                      13.0   6.77  )-----------( 250      ( 14 Mar 2019 22:40 )             38.6       03-14    143  |  105  |  20  ----------------------------<  99  4.7   |  27  |  0.6<L>    Ca    9.5      14 Mar 2019 22:40        PT/INR - ( 14 Mar 2019 22:40 )   PT: 11.80 sec;   INR: 1.03 ratio         PTT - ( 14 Mar 2019 22:40 )  PTT:31.9 sec      ASSESSMENT/ PLAN:   - consulted for suprapubic catheter upsizing   - patient had 14 Fr SPT placed at [facility] [place] 4 weeks ago d/t urinary retention   - presents with leaking around tube, stitch pulled from skin   - plan for upsizing today 3/15  - case discussed between Dr. Sims and Dr. Joyner   - please keep NPO  - hold anticoagulation until after procedure      Thank you for the courtesy of this consult, please call [phone] with any further questions.

## 2019-03-15 NOTE — PROGRESS NOTE ADULT - SUBJECTIVE AND OBJECTIVE BOX
Interventional Radiology Brief- Operative Note    Procedure: image guided 14FR SPT exchange     Pre-Op Diagnosis: severe urethral stricture, spt placed at [facility], now leaking    Post-Op Diagnosis: original spt clogged, severe urethral stricture    Attending: Jus    Resident: none    Anesthesia (type):  [ ] General Anesthesia  [ x] Sedation  [ ] Spinal Anesthesia  [ x] Local/Regional    Contrast: 100ml    Estimated Blood Loss: 10ml    Condition:   [ ] Critical  [ ] Serious  [ x] Fair   [ ] Good    Findings/Follow up Plan of Care:  -Distal tip of original spt was completely clogged (several 0.18 and 0.35in wires could not get past the occluded tip).  Side holes proximal to clog were open and therefore we were able to perform a cystogram.  After 60ml of injected contrast, contrast preferentially filled the urethra and b/l ureters making an upsize very difficult and decision was made to exchange.  Multiple wire/catheter combinations could not be passed along side the original spt to keep access.  Therefore it was removed and a new 14fr catheter was placed through the original track, after wire/catheter used to re-access the bladder.  Cystogram performed which confirmed placement  -Post procedure CT is recommended to evaluate anterior subcutaneous tissues and to ensure proper placement  Specimens Removed:  none  Implants:  none  Complications:  Clogged catheter- exchange of the spt only, no upsize  Condition/Disposition:  DC to floor after CT    Please call Interventional Radiology [phone] with any questions, concerns, or issues.

## 2019-03-15 NOTE — PROGRESS NOTE ADULT - SUBJECTIVE AND OBJECTIVE BOX
TISH SHAIKH 63y Male  MRN#: [id number]       SUBJECTIVE  64 yo male patient with PMHX of CP, seizures, and urethral stricture s/p suprapubic catheter placement, was sent from NH because of leaking urine from around his suprapubic cath.  His urologist was consulted and requested sending him to ED for an IR procedure (changing alatorre to a bigger size one).  Patient has no complaints. No fever, no chest or abdominal pain, no SOB, no diarrhea.  IR were consulted and patient was added to the schedule for tomorrow      OBJECTIVE  PAST MEDICAL & SURGICAL HISTORY  Asthma  Urinary retention  Urinary calculi  Spastic quadriplegia  Osteoporosis  Seizure  Cerebral palsy  BPH (benign prostatic hyperplasia)  H/O cystoscopy  S/P percutaneous endoscopic gastrostomy (PEG) tube placement    ALLERGIES:  No Known Allergies    MEDICATIONS:  STANDING MEDICATIONS  ALBUTerol    0.083% 2.5 milliGRAM(s) Nebulizer every 6 hours  baclofen 10 milliGRAM(s) Oral three times a day  chlorhexidine 4% Liquid 1 Application(s) Topical <User Schedule>  enoxaparin Injectable 40 milliGRAM(s) SubCutaneous daily  finasteride 5 milliGRAM(s) Oral daily  oxybutynin 10 milliGRAM(s) Oral daily  pantoprazole   Suspension 40 milliGRAM(s) Enteral Tube before breakfast  PHENobarbital 64.8 milliGRAM(s) Oral daily  senna 1 Tablet(s) Oral two times a day  tamsulosin 0.4 milliGRAM(s) Oral at bedtime    PRN MEDICATIONS  lactulose Syrup 15 Gram(s) Enteral Tube two times a day PRN      VITAL SIGNS: Last 24 Hours  T(C): 36.2 (15 Mar 2019 05:13), Max: 36.7 (14 Mar 2019 23:03)  T(F): 97.1 (15 Mar 2019 05:13), Max: 98 (14 Mar 2019 23:03)  HR: 77 (15 Mar 2019 05:13) (75 - 77)  BP: 122/57 (15 Mar 2019 05:13) (105/62 - 122/57)  BP(mean): --  RR: 18 (15 Mar 2019 05:13) (18 - 18)  SpO2: 94% (15 Mar 2019 03:57) (94% - 94%)    LABS:                        13.0   6.77  )-----------( 250      ( 14 Mar 2019 22:40 )             38.6     03-14    143  |  105  |  20  ----------------------------<  99  4.7   |  27  |  0.6<L>    Ca    9.5      14 Mar 2019 22:40      PT/INR - ( 14 Mar 2019 22:40 )   PT: 11.80 sec;   INR: 1.03 ratio         PTT - ( 14 Mar 2019 22:40 )  PTT:31.9 sec              RADIOLOGY:      PHYSICAL EXAM:    GENERAL: NAD, well-developed, AAOx3  HEENT:  Atraumatic, Normocephalic. EOMI, PERRLA, conjunctiva and sclera clear, No JVD  PULMONARY: Clear to auscultation bilaterally; No wheeze  CARDIOVASCULAR: Regular rate and rhythm; No murmurs, rubs, or gallops  GASTROINTESTINAL: Soft, Nontender, Nondistended; Bowel sounds present  MUSCULOSKELETAL:  2+ Peripheral Pulses, No clubbing, cyanosis, or edema  NEUROLOGY: non-focal  SKIN: No rashes or lesions      ADMISSION SUMMARY  Patient is a 63y old Male who presents with a chief complaint of Leaking urine from around the procedure (15 Mar 2019 15:14)  Currently admitted to medicine with the primary diagnosis of Suprapubic catheter dysfunction, initial encounter  Hospital course has been complicated by _______.       ASSESSMENT & PLAN    1. SUPRAPUBIC CATHETER DYSFUNCTION INITIAL ENCOUNTER      2.    3. Asthma  Urinary retention  Urinary calculi  Spastic quadriplegia  Osteoporosis  Seizure  Cerebral palsy  BPH (benign prostatic hyperplasia)        Present today:  ( ) Congestive Heart Failure, Yes? ( )Acute / ( )Acute on Chronic / ( )Chronic  :  ( )Systolic / ( )Diastolic               Plan:  ( ) Complicated Pneumonia, Type?  ( )Parapneumonic effusion / ( )Abscess / ( ) Multilobar / ( )Other               Plan:  ( ) Morbid Obesity, Yes? BMI:               Plan:  ( ) Functional Quadriplegia               Plan:  ( ) Encephalopathy               Plan:    ( ) Discussion with patient and/or family regarding goals of care  ( ) Discussed Case and Plan with Medical Attending, Name:      # Planned Disposition: ________ TISH SHAIKH 63y Male  MRN#: [id number]       SUBJECTIVE  64 yo male patient with PMHX of cerebral palsy, seizures, and urethral stricture s/p suprapubic catheter placement, was sent from NH because of leaking urine from around his suprapubic cath. He underwent revision of suprapubic catheter placement today. Will follow up with the IR recommendations.     OBJECTIVE  PAST MEDICAL & SURGICAL HISTORY  Asthma  Urinary retention  Urinary calculi  Spastic quadriplegia  Osteoporosis  Seizure  Cerebral palsy  BPH (benign prostatic hyperplasia)  H/O cystoscopy  S/P percutaneous endoscopic gastrostomy (PEG) tube placement    ALLERGIES:  No Known Allergies    MEDICATIONS:  STANDING MEDICATIONS  ALBUTerol    0.083% 2.5 milliGRAM(s) Nebulizer every 6 hours  baclofen 10 milliGRAM(s) Oral three times a day  chlorhexidine 4% Liquid 1 Application(s) Topical <User Schedule>  enoxaparin Injectable 40 milliGRAM(s) SubCutaneous daily  finasteride 5 milliGRAM(s) Oral daily  oxybutynin 10 milliGRAM(s) Oral daily  pantoprazole   Suspension 40 milliGRAM(s) Enteral Tube before breakfast  PHENobarbital 64.8 milliGRAM(s) Oral daily  senna 1 Tablet(s) Oral two times a day  tamsulosin 0.4 milliGRAM(s) Oral at bedtime    PRN MEDICATIONS  lactulose Syrup 15 Gram(s) Enteral Tube two times a day PRN      VITAL SIGNS: Last 24 Hours  T(C): 36.2 (15 Mar 2019 05:13), Max: 36.7 (14 Mar 2019 23:03)  T(F): 97.1 (15 Mar 2019 05:13), Max: 98 (14 Mar 2019 23:03)  HR: 77 (15 Mar 2019 05:13) (75 - 77)  BP: 122/57 (15 Mar 2019 05:13) (105/62 - 122/57)  BP(mean): --  RR: 18 (15 Mar 2019 05:13) (18 - 18)  SpO2: 94% (15 Mar 2019 03:57) (94% - 94%)    LABS:                        13.0   6.77  )-----------( 250      ( 14 Mar 2019 22:40 )             38.6     03-14    143  |  105  |  20  ----------------------------<  99  4.7   |  27  |  0.6<L>    Ca    9.5      14 Mar 2019 22:40      PT/INR - ( 14 Mar 2019 22:40 )   PT: 11.80 sec;   INR: 1.03 ratio         PTT - ( 14 Mar 2019 22:40 )  PTT:31.9 sec      RADIOLOGY:  < from: CT Abdomen and Pelvis No Cont (03.15.19 @ 17:25) >  IMPRESSION: Suprapubic catheter again noted within the decompressed   urinary bladder with unchanged mild wall thickening, at least partially   secondary to underdistention. Correlate with urinalysis for cystitis.    New small air foci within the right anterior perineum tracking up along   the anterior aspect of the right rectus abdominis, likely postprocedural   changes.    < end of copied text >      PHYSICAL EXAM:  GENERAL: NAD  HEENT:  Atraumatic, Normocephalic.  PULMONARY: Clear to auscultation bilaterally; No wheeze  CARDIOVASCULAR: Regular rate and rhythm  GASTROINTESTINAL: Soft, Nontender, Peg tube clean. Suprapubic catheter noted.   EXTREMITIES: Upper extremities contracted noted.     ADMISSION SUMMARY  Patient is a 63y old Male who presents with a chief complaint of Leaking urine from around the suprapubic catheter. (15 Mar 2019 15:14)        ASSESSMENT & PLAN  #) Leaking suprapubic cath  Change of suprapubic cath with IR tomorrow  NPO after midnight  Urology spoke with IR and patient added to the schedule  Check baseline blood work and coags  Continue with flomax, fenasteride, oxybutynin    #) Seizures disorder  continue with phenobarb    #) Chronic constipation  Bedridden  continue with senna, lactulose as needed    #) Miscellaneous  DVT and GI ppx  Resume diet after procedure (Jevity 1.2 250cc 4 times a day)  Out of bed with Dahlia lift  Full code  Dispo back to SN[facility] TISH SHAIKH 63y Male  MRN#: [id number]       SUBJECTIVE  64 yo male patient with PMHX of cerebral palsy, seizures, and urethral stricture s/p suprapubic catheter placement, was sent from NH because of leaking urine from around his suprapubic cath. He underwent revision of suprapubic catheter placement today. Will follow up with the IR recommendations.     OBJECTIVE  PAST MEDICAL & SURGICAL HISTORY  Asthma  Urinary retention  Urinary calculi  Spastic quadriplegia  Osteoporosis  Seizure  Cerebral palsy  BPH (benign prostatic hyperplasia)  H/O cystoscopy  S/P percutaneous endoscopic gastrostomy (PEG) tube placement    ALLERGIES:  No Known Allergies    MEDICATIONS:  STANDING MEDICATIONS  ALBUTerol    0.083% 2.5 milliGRAM(s) Nebulizer every 6 hours  baclofen 10 milliGRAM(s) Oral three times a day  chlorhexidine 4% Liquid 1 Application(s) Topical <User Schedule>  enoxaparin Injectable 40 milliGRAM(s) SubCutaneous daily  finasteride 5 milliGRAM(s) Oral daily  oxybutynin 10 milliGRAM(s) Oral daily  pantoprazole   Suspension 40 milliGRAM(s) Enteral Tube before breakfast  PHENobarbital 64.8 milliGRAM(s) Oral daily  senna 1 Tablet(s) Oral two times a day  tamsulosin 0.4 milliGRAM(s) Oral at bedtime    PRN MEDICATIONS  lactulose Syrup 15 Gram(s) Enteral Tube two times a day PRN      VITAL SIGNS: Last 24 Hours  T(C): 36.2 (15 Mar 2019 05:13), Max: 36.7 (14 Mar 2019 23:03)  T(F): 97.1 (15 Mar 2019 05:13), Max: 98 (14 Mar 2019 23:03)  HR: 77 (15 Mar 2019 05:13) (75 - 77)  BP: 122/57 (15 Mar 2019 05:13) (105/62 - 122/57)  BP(mean): --  RR: 18 (15 Mar 2019 05:13) (18 - 18)  SpO2: 94% (15 Mar 2019 03:57) (94% - 94%)    LABS:                        13.0   6.77  )-----------( 250      ( 14 Mar 2019 22:40 )             38.6     03-14    143  |  105  |  20  ----------------------------<  99  4.7   |  27  |  0.6<L>    Ca    9.5      14 Mar 2019 22:40      PT/INR - ( 14 Mar 2019 22:40 )   PT: 11.80 sec;   INR: 1.03 ratio         PTT - ( 14 Mar 2019 22:40 )  PTT:31.9 sec      RADIOLOGY:  < from: CT Abdomen and Pelvis No Cont (03.15.19 @ 17:25) >  IMPRESSION: Suprapubic catheter again noted within the decompressed   urinary bladder with unchanged mild wall thickening, at least partially   secondary to underdistention. Correlate with urinalysis for cystitis.    New small air foci within the right anterior perineum tracking up along   the anterior aspect of the right rectus abdominis, likely postprocedural   changes.    < end of copied text >      PHYSICAL EXAM:  GENERAL: NAD  HEENT:  Atraumatic, Normocephalic.  PULMONARY: Clear to auscultation bilaterally; No wheeze  CARDIOVASCULAR: Regular rate and rhythm  GASTROINTESTINAL: Soft, Nontender, Peg tube clean. Suprapubic catheter noted.   EXTREMITIES: Upper extremities contracted noted.     ADMISSION SUMMARY  Patient is a 63y old Male who presents with a chief complaint of Leaking urine from around the suprapubic catheter. (15 Mar 2019 15:14)        ASSESSMENT & PLAN  #) Leaking suprapubic cath  -Suprapubic catheter replacement by IR today.  -Will follow up with IR recommendations       #) Seizures disorder  continue with phenobarb    #) Chronic constipation  Bedridden  continue with senna, lactulose as needed    #) Miscellaneous  DVT and GI ppx  Resume diet after procedure (Jevity 1.2 250cc 4 times a day)  Out of bed with Dahlia lift  Full code  Dispo back to SNF TISH SHAIKH 63y Male  MRN#: [id number]       SUBJECTIVE  64 yo male patient with PMHX of cerebral palsy, seizures, and urethral stricture s/p suprapubic catheter placement, was sent from NH because of leaking urine from around his suprapubic cath. He underwent revision of suprapubic catheter placement today. Will follow up with the IR recommendations.     OBJECTIVE  PAST MEDICAL & SURGICAL HISTORY  Asthma  Urinary retention  Urinary calculi  Spastic quadriplegia  Osteoporosis  Seizure  Cerebral palsy  BPH (benign prostatic hyperplasia)  H/O cystoscopy  S/P percutaneous endoscopic gastrostomy (PEG) tube placement    ALLERGIES:  No Known Allergies    MEDICATIONS:  STANDING MEDICATIONS  ALBUTerol    0.083% 2.5 milliGRAM(s) Nebulizer every 6 hours  baclofen 10 milliGRAM(s) Oral three times a day  chlorhexidine 4% Liquid 1 Application(s) Topical <User Schedule>  enoxaparin Injectable 40 milliGRAM(s) SubCutaneous daily  finasteride 5 milliGRAM(s) Oral daily  oxybutynin 10 milliGRAM(s) Oral daily  pantoprazole   Suspension 40 milliGRAM(s) Enteral Tube before breakfast  PHENobarbital 64.8 milliGRAM(s) Oral daily  senna 1 Tablet(s) Oral two times a day  tamsulosin 0.4 milliGRAM(s) Oral at bedtime    PRN MEDICATIONS  lactulose Syrup 15 Gram(s) Enteral Tube two times a day PRN      VITAL SIGNS: Last 24 Hours  T(C): 36.2 (15 Mar 2019 05:13), Max: 36.7 (14 Mar 2019 23:03)  T(F): 97.1 (15 Mar 2019 05:13), Max: 98 (14 Mar 2019 23:03)  HR: 77 (15 Mar 2019 05:13) (75 - 77)  BP: 122/57 (15 Mar 2019 05:13) (105/62 - 122/57)  BP(mean): --  RR: 18 (15 Mar 2019 05:13) (18 - 18)  SpO2: 94% (15 Mar 2019 03:57) (94% - 94%)    LABS:                        13.0   6.77  )-----------( 250      ( 14 Mar 2019 22:40 )             38.6     03-14    143  |  105  |  20  ----------------------------<  99  4.7   |  27  |  0.6<L>    Ca    9.5      14 Mar 2019 22:40      PT/INR - ( 14 Mar 2019 22:40 )   PT: 11.80 sec;   INR: 1.03 ratio         PTT - ( 14 Mar 2019 22:40 )  PTT:31.9 sec      RADIOLOGY:  < from: CT Abdomen and Pelvis No Cont (03.15.19 @ 17:25) >  IMPRESSION: Suprapubic catheter again noted within the decompressed   urinary bladder with unchanged mild wall thickening, at least partially   secondary to underdistention. Correlate with urinalysis for cystitis.    New small air foci within the right anterior perineum tracking up along   the anterior aspect of the right rectus abdominis, likely postprocedural   changes.    < end of copied text >      PHYSICAL EXAM:  GENERAL: NAD  HEENT:  Atraumatic, Normocephalic.  PULMONARY: Clear to auscultation bilaterally; No wheeze  CARDIOVASCULAR: Regular rate and rhythm  GASTROINTESTINAL: Soft, Nontender, Peg tube clean. Suprapubic catheter noted.   EXTREMITIES: Upper extremities contracted noted.     ADMISSION SUMMARY  Patient is a 63y old Male who presents with a chief complaint of Leaking urine from around the suprapubic catheter. (15 Mar 2019 15:14)        ASSESSMENT & PLAN  #) Leaking suprapubic cath  -Suprapubic catheter replacement by IR today.  -Will follow up with IR recommendations       #) Seizures disorder  -Continue with Phenobarbitol    #) Chronic constipation/ Functional Quadriplegia  -Bedridden. Continue with senna and lactulose as needed.  continue with senna, lactulose as needed    # DVT Prophylaxis  -On Lovenox

## 2019-03-16 ENCOUNTER — TRANSCRIPTION ENCOUNTER (OUTPATIENT)
Age: 64
End: 2019-03-16

## 2019-03-16 VITALS
SYSTOLIC BLOOD PRESSURE: 107 MMHG | DIASTOLIC BLOOD PRESSURE: 58 MMHG | TEMPERATURE: 98 F | RESPIRATION RATE: 18 BRPM | HEART RATE: 75 BPM

## 2019-03-16 LAB
ALBUMIN SERPL ELPH-MCNC: 3.6 G/DL — SIGNIFICANT CHANGE UP (ref 3.5–5.2)
ALP SERPL-CCNC: 80 U/L — SIGNIFICANT CHANGE UP (ref 30–115)
ALT FLD-CCNC: 19 U/L — SIGNIFICANT CHANGE UP (ref 0–41)
ANION GAP SERPL CALC-SCNC: 12 MMOL/L — SIGNIFICANT CHANGE UP (ref 7–14)
AST SERPL-CCNC: 19 U/L — SIGNIFICANT CHANGE UP (ref 0–41)
BILIRUB SERPL-MCNC: 0.3 MG/DL — SIGNIFICANT CHANGE UP (ref 0.2–1.2)
BUN SERPL-MCNC: 19 MG/DL — SIGNIFICANT CHANGE UP (ref 10–20)
CALCIUM SERPL-MCNC: 8.8 MG/DL — SIGNIFICANT CHANGE UP (ref 8.5–10.1)
CHLORIDE SERPL-SCNC: 107 MMOL/L — SIGNIFICANT CHANGE UP (ref 98–110)
CO2 SERPL-SCNC: 25 MMOL/L — SIGNIFICANT CHANGE UP (ref 17–32)
CREAT SERPL-MCNC: 0.5 MG/DL — LOW (ref 0.7–1.5)
GLUCOSE SERPL-MCNC: 134 MG/DL — HIGH (ref 70–99)
HCT VFR BLD CALC: 38.1 % — LOW (ref 42–52)
HGB BLD-MCNC: 12.8 G/DL — LOW (ref 14–18)
MCHC RBC-ENTMCNC: 31.1 PG — HIGH (ref 27–31)
MCHC RBC-ENTMCNC: 33.6 G/DL — SIGNIFICANT CHANGE UP (ref 32–37)
MCV RBC AUTO: 92.5 FL — SIGNIFICANT CHANGE UP (ref 80–94)
NRBC # BLD: 0 /100 WBCS — SIGNIFICANT CHANGE UP (ref 0–0)
PLATELET # BLD AUTO: 234 K/UL — SIGNIFICANT CHANGE UP (ref 130–400)
POTASSIUM SERPL-MCNC: 4.3 MMOL/L — SIGNIFICANT CHANGE UP (ref 3.5–5)
POTASSIUM SERPL-SCNC: 4.3 MMOL/L — SIGNIFICANT CHANGE UP (ref 3.5–5)
PROT SERPL-MCNC: 6.2 G/DL — SIGNIFICANT CHANGE UP (ref 6–8)
RBC # BLD: 4.12 M/UL — LOW (ref 4.7–6.1)
RBC # FLD: 13.3 % — SIGNIFICANT CHANGE UP (ref 11.5–14.5)
SODIUM SERPL-SCNC: 144 MMOL/L — SIGNIFICANT CHANGE UP (ref 135–146)
WBC # BLD: 5.83 K/UL — SIGNIFICANT CHANGE UP (ref 4.8–10.8)
WBC # FLD AUTO: 5.83 K/UL — SIGNIFICANT CHANGE UP (ref 4.8–10.8)

## 2019-03-16 RX ADMIN — ALBUTEROL 2.5 MILLIGRAM(S): 90 AEROSOL, METERED ORAL at 13:51

## 2019-03-16 RX ADMIN — Medication 10 MILLIGRAM(S): at 13:25

## 2019-03-16 RX ADMIN — ALBUTEROL 2.5 MILLIGRAM(S): 90 AEROSOL, METERED ORAL at 08:15

## 2019-03-16 RX ADMIN — ENOXAPARIN SODIUM 40 MILLIGRAM(S): 100 INJECTION SUBCUTANEOUS at 12:54

## 2019-03-16 RX ADMIN — FINASTERIDE 5 MILLIGRAM(S): 5 TABLET, FILM COATED ORAL at 12:55

## 2019-03-16 RX ADMIN — Medication 64.8 MILLIGRAM(S): at 13:24

## 2019-03-16 RX ADMIN — CHLORHEXIDINE GLUCONATE 1 APPLICATION(S): 213 SOLUTION TOPICAL at 06:12

## 2019-03-16 RX ADMIN — SENNA PLUS 1 TABLET(S): 8.6 TABLET ORAL at 06:12

## 2019-03-16 RX ADMIN — Medication 10 MILLIGRAM(S): at 06:12

## 2019-03-16 RX ADMIN — PANTOPRAZOLE SODIUM 40 MILLIGRAM(S): 20 TABLET, DELAYED RELEASE ORAL at 06:12

## 2019-03-16 RX ADMIN — Medication 10 MILLIGRAM(S): at 12:54

## 2019-03-16 NOTE — DISCHARGE NOTE PROVIDER - HOSPITAL COURSE
62 yo male patient with PMHX of cerebral palsy, seizures, and urethral stricture s/p suprapubic catheter placement, was sent from NH because of leaking urine from around his suprapubic cath. He underwent revision of suprapubic catheter placementby the IR, draining and functioning well. The patient is stable to be discharged. 64 yo male patient with PMHX of cerebral palsy, seizures, and urethral stricture s/p suprapubic catheter placement, was sent from NH because of leaking urine from around his suprapubic cath. He underwent revision of suprapubic catheter placementby the IR, draining and functioning well. The patient is stable to be discharged.         Attending NOte:    Patient seen and examined independently. I personally had a face-to-face encounter with the patient, examined the patient myself and reviewed the plan of care with the housestaff. Agree with resident's note but my note supersedes that of the resident in the matters hereby listed.     patient's SPT exchanged by IR on 3/15.     stable for d/c back to his group home now.

## 2019-03-16 NOTE — PROGRESS NOTE ADULT - SUBJECTIVE AND OBJECTIVE BOX
TISH SHAIKH 63y Male  MRN#: [id number]       SUBJECTIVE  62 yo male patient with PMHX of cerebral palsy, seizures, and urethral stricture s/p suprapubic catheter placement, was sent from NH because of leaking urine from around his suprapubic cath. He underwent revision of suprapubic catheter placement yesterday, will likely be discharged today.    OBJECTIVE  PAST MEDICAL & SURGICAL HISTORY  Asthma  Urinary retention  Urinary calculi  Spastic quadriplegia  Osteoporosis  Seizure  Cerebral palsy  BPH (benign prostatic hyperplasia)  H/O cystoscopy  S/P percutaneous endoscopic gastrostomy (PEG) tube placement    ALLERGIES:  No Known Allergies    MEDICATIONS:  STANDING MEDICATIONS  ALBUTerol    0.083% 2.5 milliGRAM(s) Nebulizer every 6 hours  baclofen 10 milliGRAM(s) Oral three times a day  chlorhexidine 4% Liquid 1 Application(s) Topical <User Schedule>  enoxaparin Injectable 40 milliGRAM(s) SubCutaneous daily  finasteride 5 milliGRAM(s) Oral daily  oxybutynin 10 milliGRAM(s) Oral daily  pantoprazole   Suspension 40 milliGRAM(s) Enteral Tube before breakfast  PHENobarbital 64.8 milliGRAM(s) Oral daily  senna 1 Tablet(s) Oral two times a day  tamsulosin 0.4 milliGRAM(s) Oral at bedtime    PRN MEDICATIONS  lactulose Syrup 15 Gram(s) Enteral Tube two times a day PRN      VITAL SIGNS: Last 24 Hours  T(C): 36.2 (16 Mar 2019 05:15), Max: 36.9 (15 Mar 2019 20:01)  T(F): 97.1 (16 Mar 2019 05:15), Max: 98.4 (15 Mar 2019 20:01)  HR: 71 (16 Mar 2019 05:15) (70 - 91)  BP: 93/55 (16 Mar 2019 05:15) (93/55 - 118/71)  BP(mean): --  RR: 18 (16 Mar 2019 05:15) (16 - 18)  SpO2: 94% (15 Mar 2019 20:00) (94% - 94%)    LABS:                        13.0   6.77  )-----------( 250      ( 14 Mar 2019 22:40 )             38.6     03-14    143  |  105  |  20  ----------------------------<  99  4.7   |  27  |  0.6<L>    Ca    9.5      14 Mar 2019 22:40      PT/INR - ( 14 Mar 2019 22:40 )   PT: 11.80 sec;   INR: 1.03 ratio         PTT - ( 14 Mar 2019 22:40 )  PTT:31.9 sec    PHYSICAL EXAM:  GENERAL: NAD  HEENT:  Atraumatic, Normocephalic.  PULMONARY: Clear to auscultation bilaterally; No wheeze  CARDIOVASCULAR: Regular rate and rhythm  GASTROINTESTINAL: Soft, Nontender, Peg tube clean. Suprapubic catheter noted.   EXTREMITIES: Upper extremities contracted noted.     ADMISSION SUMMARY  Patient is a 63y old Male who presents with a chief complaint of Leaking urine from around the suprapubic catheter. (15 Mar 2019 15:14)    ASSESSMENT & PLAN  #) Leaking suprapubic cath  -Suprapubic catheter replaced.   -Will follow up with IR and urology recommendations.     #) Seizures disorder  -Continue with Phenobarbitol    #) Chronic constipation/ Functional Quadriplegia  -Bedridden. Continue with senna and lactulose as needed.    # DVT Prophylaxis  -On Lovenox

## 2019-03-16 NOTE — DISCHARGE NOTE PROVIDER - CARE PROVIDER_API CALL
Solis Joyner)  [facility]  [address] [place] [address], [address]  [place] [address]  Phone: [phone]  Fax: [phone]  Follow Up Time:

## 2019-03-16 NOTE — PROGRESS NOTE ADULT - REASON FOR ADMISSION
Leaking urine from around the alatorre
Leaking urine from around the procedure

## 2019-03-16 NOTE — DISCHARGE NOTE PROVIDER - NSDCCPCAREPLAN_GEN_ALL_CORE_FT
PRINCIPAL DISCHARGE DIAGNOSIS  Diagnosis: Suprapubic catheter dysfunction, initial encounter  Assessment and Plan of Treatment: Replacement of the supra-pubic catheter by the IR. Follow up with urology outpatient.

## 2019-03-16 NOTE — DISCHARGE NOTE NURSING/CASE MANAGEMENT/SOCIAL WORK - NSDCDPATPORTLINK_GEN_ALL_CORE
You can access the [facility] Patient Portal, offered by [facility], by registering with the following website: [email or web address]

## 2019-03-20 DIAGNOSIS — R33.9 RETENTION OF URINE, UNSPECIFIED: ICD-10-CM

## 2019-03-20 DIAGNOSIS — Z74.01 BED CONFINEMENT STATUS: ICD-10-CM

## 2019-03-20 DIAGNOSIS — N40.1 BENIGN PROSTATIC HYPERPLASIA WITH LOWER URINARY TRACT SYMPTOMS: ICD-10-CM

## 2019-03-20 DIAGNOSIS — J45.909 UNSPECIFIED ASTHMA, UNCOMPLICATED: ICD-10-CM
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
## 2019-03-20 DIAGNOSIS — T83.198A OTHER MECHANICAL COMPLICATION OF OTHER URINARY DEVICES AND IMPLANTS, INITIAL ENCOUNTER: ICD-10-CM

## 2019-03-20 DIAGNOSIS — N35.919 UNSPECIFIED URETHRAL STRICTURE, MALE, UNSPECIFIED SITE: ICD-10-CM

## 2019-03-20 DIAGNOSIS — G80.0 SPASTIC QUADRIPLEGIC CEREBRAL PALSY: ICD-10-CM

## 2019-03-20 DIAGNOSIS — G40.909 EPILEPSY, UNSPECIFIED, NOT INTRACTABLE, WITHOUT STATUS EPILEPTICUS: ICD-10-CM
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
## 2019-03-20 DIAGNOSIS — K59.09 OTHER CONSTIPATION: ICD-10-CM

## 2019-03-20 DIAGNOSIS — M81.0 AGE-RELATED OSTEOPOROSIS WITHOUT CURRENT PATHOLOGICAL FRACTURE: ICD-10-CM

## 2019-03-22 RX ORDER — SOFT LENS RINSE,STORE SOLUTION
0.9 SOLUTION, NON-ORAL MISCELLANEOUS
Qty: 30 | Refills: 5 | Status: ACTIVE | COMMUNITY
Start: 2019-02-26 | End: 1900-01-01

## 2019-03-25 ENCOUNTER — INPATIENT (INPATIENT)
Facility: HOSPITAL | Age: 64
LOS: 1 days | Discharge: NURSING, REHAB OR LONG-TERM CARE | End: 2019-03-27
Attending: INTERNAL MEDICINE | Admitting: INTERNAL MEDICINE
Payer: MEDICARE

## 2019-03-25 VITALS
HEART RATE: 66 BPM | OXYGEN SATURATION: 98 % | TEMPERATURE: 97 F | RESPIRATION RATE: 20 BRPM | DIASTOLIC BLOOD PRESSURE: 66 MMHG | SYSTOLIC BLOOD PRESSURE: 172 MMHG

## 2019-03-25 DIAGNOSIS — Z98.890 OTHER SPECIFIED POSTPROCEDURAL STATES: Chronic | ICD-10-CM

## 2019-03-25 DIAGNOSIS — Z93.1 GASTROSTOMY STATUS: Chronic | ICD-10-CM

## 2019-03-25 LAB
ALBUMIN SERPL ELPH-MCNC: 4 G/DL — SIGNIFICANT CHANGE UP (ref 3.5–5.2)
ALP SERPL-CCNC: 87 U/L — SIGNIFICANT CHANGE UP (ref 30–115)
ALT FLD-CCNC: 16 U/L — SIGNIFICANT CHANGE UP (ref 0–41)
ANION GAP SERPL CALC-SCNC: 11 MMOL/L — SIGNIFICANT CHANGE UP (ref 7–14)
APTT BLD: 30 SEC — SIGNIFICANT CHANGE UP (ref 27–39.2)
AST SERPL-CCNC: 19 U/L — SIGNIFICANT CHANGE UP (ref 0–41)
BASOPHILS # BLD AUTO: 0.03 K/UL — SIGNIFICANT CHANGE UP (ref 0–0.2)
BASOPHILS NFR BLD AUTO: 0.5 % — SIGNIFICANT CHANGE UP (ref 0–1)
BILIRUB SERPL-MCNC: 0.2 MG/DL — SIGNIFICANT CHANGE UP (ref 0.2–1.2)
BUN SERPL-MCNC: 20 MG/DL — SIGNIFICANT CHANGE UP (ref 10–20)
CALCIUM SERPL-MCNC: 8.9 MG/DL — SIGNIFICANT CHANGE UP (ref 8.5–10.1)
CHLORIDE SERPL-SCNC: 103 MMOL/L — SIGNIFICANT CHANGE UP (ref 98–110)
CO2 SERPL-SCNC: 25 MMOL/L — SIGNIFICANT CHANGE UP (ref 17–32)
CREAT SERPL-MCNC: 0.5 MG/DL — LOW (ref 0.7–1.5)
EOSINOPHIL # BLD AUTO: 0.35 K/UL — SIGNIFICANT CHANGE UP (ref 0–0.7)
EOSINOPHIL NFR BLD AUTO: 5.8 % — SIGNIFICANT CHANGE UP (ref 0–8)
GLUCOSE SERPL-MCNC: 96 MG/DL — SIGNIFICANT CHANGE UP (ref 70–99)
HCT VFR BLD CALC: 38.2 % — LOW (ref 42–52)
HGB BLD-MCNC: 13.5 G/DL — LOW (ref 14–18)
IMM GRANULOCYTES NFR BLD AUTO: 0.2 % — SIGNIFICANT CHANGE UP (ref 0.1–0.3)
INR BLD: 1.02 RATIO — SIGNIFICANT CHANGE UP (ref 0.65–1.3)
LYMPHOCYTES # BLD AUTO: 1.33 K/UL — SIGNIFICANT CHANGE UP (ref 1.2–3.4)
LYMPHOCYTES # BLD AUTO: 22 % — SIGNIFICANT CHANGE UP (ref 20.5–51.1)
MCHC RBC-ENTMCNC: 32.2 PG — HIGH (ref 27–31)
MCHC RBC-ENTMCNC: 35.3 G/DL — SIGNIFICANT CHANGE UP (ref 32–37)
MCV RBC AUTO: 91.2 FL — SIGNIFICANT CHANGE UP (ref 80–94)
MONOCYTES # BLD AUTO: 0.69 K/UL — HIGH (ref 0.1–0.6)
MONOCYTES NFR BLD AUTO: 11.4 % — HIGH (ref 1.7–9.3)
NEUTROPHILS # BLD AUTO: 3.64 K/UL — SIGNIFICANT CHANGE UP (ref 1.4–6.5)
NEUTROPHILS NFR BLD AUTO: 60.1 % — SIGNIFICANT CHANGE UP (ref 42.2–75.2)
NRBC # BLD: 0 /100 WBCS — SIGNIFICANT CHANGE UP (ref 0–0)
PLATELET # BLD AUTO: 217 K/UL — SIGNIFICANT CHANGE UP (ref 130–400)
POTASSIUM SERPL-MCNC: 4 MMOL/L — SIGNIFICANT CHANGE UP (ref 3.5–5)
POTASSIUM SERPL-SCNC: 4 MMOL/L — SIGNIFICANT CHANGE UP (ref 3.5–5)
PROT SERPL-MCNC: 6.6 G/DL — SIGNIFICANT CHANGE UP (ref 6–8)
PROTHROM AB SERPL-ACNC: 11.7 SEC — SIGNIFICANT CHANGE UP (ref 9.95–12.87)
RBC # BLD: 4.19 M/UL — LOW (ref 4.7–6.1)
RBC # FLD: 13.2 % — SIGNIFICANT CHANGE UP (ref 11.5–14.5)
SODIUM SERPL-SCNC: 139 MMOL/L — SIGNIFICANT CHANGE UP (ref 135–146)
WBC # BLD: 6.05 K/UL — SIGNIFICANT CHANGE UP (ref 4.8–10.8)
WBC # FLD AUTO: 6.05 K/UL — SIGNIFICANT CHANGE UP (ref 4.8–10.8)

## 2019-03-25 NOTE — ED PROVIDER NOTE - PHYSICAL EXAMINATION
VITAL SIGNS: I have reviewed nursing notes and confirm.  CONSTITUTIONAL: Well-developed; well-nourished; in no acute distress.  SKIN: Skin exam is warm, dry, mildly macerated and erythematous without cellulitis to SPT site. suture in place.   HEAD: Normocephalic; atraumatic.  ENT: No nasal discharge; airway clear.  NECK: Supple; non tender.  CARD: RRR, no  murmur  RESP: No wheezes, rales or rhonchi.  ABD: Normal bowel sounds; good BS, non tender abdomen, feels mildly distended/tense but patietn states this is always how it feels  EXT: limited due to spasticity  NEURO: Alert, oriented. At baseline  PSYCH: Cooperative, appropriate.

## 2019-03-25 NOTE — ED PROVIDER NOTE - NS ED ROS FT
Constitutional: no fever, chills,  Cardiac: No chest pain, SOB or edema.  Respiratory: No cough or respiratory distress  GI: No nausea, vomiting, diarrhea or abdominal pain.  : see HPI  MS: no pain to back or extremities, has spastic quadriplegia at baseline  Neuro: No headache or weaknes  Skin: No skin rash, mild irritation at SPT site.  Except as documented in the HPI, all other systems are negative.

## 2019-03-25 NOTE — ED PROVIDER NOTE - OBJECTIVE STATEMENT
64 yo M, history of CP, seizure disorder, indwelling PEG for all feeds, suprapubic cath 2/2 urethral stricture here for assessment of leaving from his catheter site.    The patient follows with Dr. Ar HADDAD, was seen here 10 days ago for IR upsizing of his SPT catheter from 14Fr pigtail to 26 Fr. Per adult home staff with patient, was draining well until yesterday, started draining all around site instead.     The patient has no complaints other than his clothes getting wet with urine. No abdominal pain, constipation, vomiting, fever, chills or back pain.

## 2019-03-25 NOTE — ED PROVIDER NOTE - CARE PLAN
Principal Discharge DX:	Suprapubic catheter dysfunction  Assessment and plan of treatment:	Likely occluded SPT, will get labs, bedside US, speak with  and reassess

## 2019-03-25 NOTE — ED PROVIDER NOTE - PROGRESS NOTE DETAILS
Bedside US with 380mL of urine with alatorre balloon in place. per chart review, catheter was not actually upsized due to difficulty passing wire. Spoke with CATIE Cohen, will attempt to flush but the patient will have to be admitted/obs'd for IR eval in AM.

## 2019-03-26 PROCEDURE — 99221 1ST HOSP IP/OBS SF/LOW 40: CPT

## 2019-03-26 RX ORDER — DOCUSATE SODIUM 100 MG
100 CAPSULE ORAL DAILY
Qty: 0 | Refills: 0 | Status: DISCONTINUED | OUTPATIENT
Start: 2019-03-26 | End: 2019-03-27

## 2019-03-26 RX ORDER — TAMSULOSIN HYDROCHLORIDE 0.4 MG/1
0.4 CAPSULE ORAL AT BEDTIME
Qty: 0 | Refills: 0 | Status: DISCONTINUED | OUTPATIENT
Start: 2019-03-26 | End: 2019-03-27

## 2019-03-26 RX ORDER — LACTULOSE 10 G/15ML
0 SOLUTION ORAL
Qty: 0 | Refills: 0 | COMMUNITY

## 2019-03-26 RX ORDER — OXYBUTYNIN CHLORIDE 5 MG
10 TABLET ORAL DAILY
Qty: 0 | Refills: 0 | Status: DISCONTINUED | OUTPATIENT
Start: 2019-03-26 | End: 2019-03-27

## 2019-03-26 RX ORDER — PHENOBARBITAL 60 MG
64.8 TABLET ORAL DAILY
Qty: 0 | Refills: 0 | Status: DISCONTINUED | OUTPATIENT
Start: 2019-03-26 | End: 2019-03-27

## 2019-03-26 RX ORDER — IOHEXOL 300 MG/ML
30 INJECTION, SOLUTION INTRAVENOUS ONCE
Qty: 0 | Refills: 0 | Status: COMPLETED | OUTPATIENT
Start: 2019-03-26 | End: 2019-03-26

## 2019-03-26 RX ORDER — CHLORHEXIDINE GLUCONATE 213 G/1000ML
15 SOLUTION TOPICAL
Qty: 0 | Refills: 0 | Status: DISCONTINUED | OUTPATIENT
Start: 2019-03-26 | End: 2019-03-27

## 2019-03-26 RX ORDER — ALBUTEROL 90 UG/1
2.5 AEROSOL, METERED ORAL ONCE
Qty: 0 | Refills: 0 | Status: DISCONTINUED | OUTPATIENT
Start: 2019-03-26 | End: 2019-03-27

## 2019-03-26 RX ORDER — CALCIUM CARBONATE 500(1250)
1 TABLET ORAL
Qty: 0 | Refills: 0 | Status: DISCONTINUED | OUTPATIENT
Start: 2019-03-26 | End: 2019-03-27

## 2019-03-26 RX ORDER — FINASTERIDE 5 MG/1
1 TABLET, FILM COATED ORAL
Qty: 0 | Refills: 0 | COMMUNITY

## 2019-03-26 RX ORDER — FINASTERIDE 5 MG/1
5 TABLET, FILM COATED ORAL DAILY
Qty: 0 | Refills: 0 | Status: DISCONTINUED | OUTPATIENT
Start: 2019-03-26 | End: 2019-03-27

## 2019-03-26 RX ORDER — ALBUTEROL 90 UG/1
2 AEROSOL, METERED ORAL EVERY 6 HOURS
Qty: 0 | Refills: 0 | Status: DISCONTINUED | OUTPATIENT
Start: 2019-03-26 | End: 2019-03-27

## 2019-03-26 RX ORDER — TAMSULOSIN HYDROCHLORIDE 0.4 MG/1
1 CAPSULE ORAL
Qty: 0 | Refills: 0 | COMMUNITY

## 2019-03-26 RX ORDER — BACLOFEN 100 %
10 POWDER (GRAM) MISCELLANEOUS THREE TIMES A DAY
Qty: 0 | Refills: 0 | Status: DISCONTINUED | OUTPATIENT
Start: 2019-03-26 | End: 2019-03-27

## 2019-03-26 RX ORDER — ENOXAPARIN SODIUM 100 MG/ML
40 INJECTION SUBCUTANEOUS EVERY 24 HOURS
Qty: 0 | Refills: 0 | Status: DISCONTINUED | OUTPATIENT
Start: 2019-03-26 | End: 2019-03-27

## 2019-03-26 RX ORDER — LACTULOSE 10 G/15ML
20 SOLUTION ORAL DAILY
Qty: 0 | Refills: 0 | Status: DISCONTINUED | OUTPATIENT
Start: 2019-03-26 | End: 2019-03-27

## 2019-03-26 RX ORDER — BACLOFEN 100 %
1 POWDER (GRAM) MISCELLANEOUS
Qty: 0 | Refills: 0 | COMMUNITY

## 2019-03-26 RX ORDER — PHENOBARBITAL 60 MG
2 TABLET ORAL
Qty: 0 | Refills: 0 | COMMUNITY

## 2019-03-26 RX ORDER — CHLORHEXIDINE GLUCONATE 213 G/1000ML
1 SOLUTION TOPICAL
Qty: 0 | Refills: 0 | Status: DISCONTINUED | OUTPATIENT
Start: 2019-03-26 | End: 2019-03-27

## 2019-03-26 RX ADMIN — Medication 1 DROP(S): at 17:35

## 2019-03-26 RX ADMIN — Medication 64.8 MILLIGRAM(S): at 13:06

## 2019-03-26 RX ADMIN — Medication 10 MILLIGRAM(S): at 13:07

## 2019-03-26 RX ADMIN — Medication 1 DROP(S): at 05:35

## 2019-03-26 RX ADMIN — IOHEXOL 30 MILLILITER(S): 300 INJECTION, SOLUTION INTRAVENOUS at 20:00

## 2019-03-26 RX ADMIN — CHLORHEXIDINE GLUCONATE 1 APPLICATION(S): 213 SOLUTION TOPICAL at 05:57

## 2019-03-26 RX ADMIN — ENOXAPARIN SODIUM 40 MILLIGRAM(S): 100 INJECTION SUBCUTANEOUS at 22:07

## 2019-03-26 RX ADMIN — Medication 1 DROP(S): at 11:54

## 2019-03-26 RX ADMIN — CHLORHEXIDINE GLUCONATE 15 MILLILITER(S): 213 SOLUTION TOPICAL at 17:35

## 2019-03-26 RX ADMIN — Medication 1 TABLET(S): at 17:33

## 2019-03-26 RX ADMIN — TAMSULOSIN HYDROCHLORIDE 0.4 MILLIGRAM(S): 0.4 CAPSULE ORAL at 22:07

## 2019-03-26 RX ADMIN — Medication 10 MILLIGRAM(S): at 22:07

## 2019-03-26 RX ADMIN — FINASTERIDE 5 MILLIGRAM(S): 5 TABLET, FILM COATED ORAL at 13:07

## 2019-03-26 NOTE — H&P ADULT - NSHPLABSRESULTS_GEN_ALL_CORE
LABS:                        13.5   6.05  )-----------( 217      ( 25 Mar 2019 22:30 )             38.2     03-25    139  |  103  |  20  ----------------------------<  96  4.0   |  25  |  0.5<L>    Ca    8.9      25 Mar 2019 22:30    TPro  6.6  /  Alb  4.0  /  TBili  0.2  /  DBili  x   /  AST  19  /  ALT  16  /  AlkPhos  87  03-25    PT/INR - ( 25 Mar 2019 22:30 )   PT: 11.70 sec;   INR: 1.02 ratio         PTT - ( 25 Mar 2019 22:30 )  PTT:30.0 sec              RADIOLOGY:

## 2019-03-26 NOTE — H&P ADULT - ATTENDING COMMENTS
Patient seen and examined independently. I agree with the resident's note, physical exam, and plan except as below.  Vital Signs Last 24 Hrs  T(C): 36.7 (26 Mar 2019 07:19), Max: 36.7 (26 Mar 2019 07:19)  T(F): 98 (26 Mar 2019 07:19), Max: 98 (26 Mar 2019 07:19)  HR: 73 (26 Mar 2019 07:19) (66 - 73)  BP: 132/65 (26 Mar 2019 07:19) (132/65 - 172/66)  BP(mean): --  RR: 20 (26 Mar 2019 07:19) (20 - 20)  SpO2: 98% (26 Mar 2019 07:19) (98% - 98%)  aoox3  contracted  cerebral palsy features  [id number]  ctabl  distended abd +bs firm +PEG  +suprapubic cath  no cce      #cerebral palsy at baseline  # urethral stricture - sp SPT tube  #SPT leaking - urology requested upsizing from IR - tract not healed or matured enough for IR to upsize yet -will need to follow up oupt  #constipation - distended abd - decreased sp enema with + BM  cont PEG feeds  #debility - pt from [facility] - as per previous notes - nursing is unable to flush SPT - now with repeated readmission for same issue - placed phone called to [facility] - awaiting phone call back  discussed with CM team - may need higher level of care at SNF if group home unable to care for SPT    discussed with resident, CM team

## 2019-03-26 NOTE — CONSULT NOTE ADULT - ASSESSMENT
62 yo male with cerebral palsy, group home resident, known to  service. Pt had SPT placed by [facility] 6 weeks ago, it was dislodged and replaced at [facility] by IR 3/15/19, pt was brought to ED because spt was not draining, it was clogged by sediment. Pt known to us and he chronically has urine sediment, that is why his spt needs to be larger than 14 fr. Staff a group home are not allowed to irrigate spt and this will be a recurring issue going forward    plan  -admit to medicine  -IR consult for upsizing SPT to 26 fr as planned with Dr. Joyner during last admission

## 2019-03-26 NOTE — H&P ADULT - NSHPPHYSICALEXAM_GEN_ALL_CORE
VITALS:   T(F): 97  HR: 66  BP: 172/66  RR: 20  SpO2: 98%    PHYSICAL EXAM:  GEN: No acute distress  LUNGS: Clear to auscultation bilaterally   HEART: S1/S2 present. RRR.   ABD: Soft, non-tender, non-distended. Bowel sounds present  NEURO: AAOX0

## 2019-03-26 NOTE — H&P ADULT - ASSESSMENT
63 M w/ Cerebral palsy from group home, seizure disorder, Spastic paraplegia, fed through PEG tube, BPH, Bladder neck stricture s/p SPT, Asthma, Nephrolithiasis, Osteoporosis, is sent in from group home SPT leaking x 1day.     # Malfunctioning SPT  - IR consult for replacement for upsizing SPT to 26 fr  - Urology following - > he chronically has urine sediment, that is why his spt needs to be larger than 14 fr. Staff at group home are not allowed to irrigate spt and this will be a recurring issue going forward  - NPO for now (last fed 6pm 3/25/19)    # Cerebral palsy / seizure disorder / Spastic paraplegia / PEG tube / BPH / Asthma / Osteoporosis  - Home meds (complete list is in paper chart)            Phenobarbital, Flomax, oxybutynin, finasteride, colace, lactulose prn, oscal, baclofen, artificial tears, albuterol  - PEG feeds - > Jevity 1.2. 237ml via gtube at 6am /10 am /2pm / 6pm / 10pm.                          50 cc flushes of water w/ each feed.    # DVTPPX: Lovenox  # CHG  # Diet per Grp home tube feeds  # Dispo: Grp home  # Full code 63 M w/ Cerebral palsy from group home, seizure disorder, Spastic paraplegia, fed through PEG tube, BPH, Bladder neck stricture s/p SPT, Asthma, Nephrolithiasis, Osteoporosis, is sent in from group home SPT leaking x 1day.     # Malfunctioning SPT  - IR consult for replacement for upsizing SPT to 26 fr  - Urology following - > he chronically has urine sediment, that is why his spt needs to be larger than 14 fr. Staff at group home are not allowed to irrigate spt and this will be a recurring issue going forward  - NPO for now (last fed 6pm 3/25/19)    # Cerebral palsy / seizure disorder / Spastic paraplegia / PEG tube / BPH / Asthma / Osteoporosis  - Home meds (complete list is in paper chart)            Phenobarbital, Flomax, oxybutynin, finasteride, colace, lactulose prn, oscal, baclofen, artificial tears, albuterol  - PEG feeds - > Jevity 1.2. 237ml via gtube at 6am /10 am /2pm / 6pm / 10pm.                          50 cc flushes of water w/ each feed.                          NPO for now in case IR can add them , if not, order tube feeds.    # DVTPPX: Lovenox  # CHG  # Diet per Grp home tube feeds  # Dispo: Grp home  # Full code

## 2019-03-26 NOTE — CONSULT NOTE ADULT - SUBJECTIVE AND OBJECTIVE BOX
INTERVENTIONAL RADIOLOGY CONSULT:     Procedure Requested: SPT upsize 14Fr to 26Fr    HPI:  63 M w/ Cerebral palsy from group home, seizure disorder, Spastic paraplegia, fed through PEG tube, BPH, Bladder neck stricture s/p SPT, Asthma, Nephrolithiasis, Osteoporosis, is sent in from group home SPT leaking x 1day.   Urology note - > Pt had SPT placed by [facility] 6 weeks ago, it was dislodged and replaced at [facility] by IR 3/15/19, pt was brought to ED because spt was not draining, it was clogged by sediment. Pt known to us and he chronically has urine sediment, that is why his spt needs to be larger than 14 fr. Staff a group home are not allowed to irrigate spt and this will be a recurring issue going forward. (26 Mar 2019 03:41)      PAST MEDICAL & SURGICAL HISTORY:  Asthma  Urinary retention  Urinary calculi  Spastic quadriplegia  Osteoporosis  Seizure  Cerebral palsy  BPH (benign prostatic hyperplasia)  H/O cystoscopy  S/P percutaneous endoscopic gastrostomy (PEG) tube placement      MEDICATIONS  (STANDING):  artificial  tears Solution 1 Drop(s) Both EYES four times a day  baclofen 10 milliGRAM(s) Oral three times a day  calcium carbonate   1250 mG (OsCal) 1 Tablet(s) Oral two times a day  chlorhexidine 0.12% Liquid 15 milliLiter(s) Oral Mucosa two times a day  chlorhexidine 4% Liquid 1 Application(s) Topical <User Schedule>  docusate sodium 100 milliGRAM(s) Oral daily  enoxaparin Injectable 40 milliGRAM(s) SubCutaneous every 24 hours  finasteride 5 milliGRAM(s) Oral daily  oxybutynin 10 milliGRAM(s) Oral daily  PHENobarbital 64.8 milliGRAM(s) Oral daily  tamsulosin 0.4 milliGRAM(s) Oral at bedtime    MEDICATIONS  (PRN):  ALBUTerol    0.083%. 2.5 milliGRAM(s) Nebulizer once PRN Shortness of Breath and/or Wheezing  ALBUTerol    90 MICROgram(s) HFA Inhaler 2 Puff(s) Inhalation every 6 hours PRN Shortness of Breath and/or Wheezing  lactulose Syrup 20 Gram(s) Oral daily PRN If patient has not had a bowel movement in 2 days      Allergies    No Known Allergies    Intolerances        Social History:   Smoking: Yes [ ]  No [ ]   ______pk yrs  ETOH  Yes [ ]  No [ ]  Social [ ]  DRUGS:  Yes [ ]  No [ ]  if so what______________    FAMILY HISTORY:  Family history unknown      Physical Exam:   Vital Signs Last 24 Hrs  T(C): 36.7 (26 Mar 2019 07:19), Max: 36.7 (26 Mar 2019 07:19)  T(F): 98 (26 Mar 2019 07:19), Max: 98 (26 Mar 2019 07:19)  HR: 73 (26 Mar 2019 07:19) (66 - 73)  BP: 132/65 (26 Mar 2019 07:19) (132/65 - 172/66)  BP(mean): --  RR: 20 (26 Mar 2019 07:19) (20 - 20)  SpO2: 98% (26 Mar 2019 07:19) (98% - 98%)      Labs:                         13.5   6.05  )-----------( 217      ( 25 Mar 2019 22:30 )             38.2     03-25    139  |  103  |  20  ----------------------------<  96  4.0   |  25  |  0.5<L>    Ca    8.9      25 Mar 2019 22:30    TPro  6.6  /  Alb  4.0  /  TBili  0.2  /  DBili  x   /  AST  19  /  ALT  16  /  AlkPhos  87  03-25    PT/INR - ( 25 Mar 2019 22:30 )   PT: 11.70 sec;   INR: 1.02 ratio         PTT - ( 25 Mar 2019 22:30 )  PTT:30.0 sec    Pertinent labs:                      13.5   6.05  )-----------( 217      ( 25 Mar 2019 22:30 )             38.2       03-25    139  |  103  |  20  ----------------------------<  96  4.0   |  25  |  0.5<L>    Ca    8.9      25 Mar 2019 22:30    TPro  6.6  /  Alb  4.0  /  TBili  0.2  /  DBili  x   /  AST  19  /  ALT  16  /  AlkPhos  87  03-25      PT/INR - ( 25 Mar 2019 22:30 )   PT: 11.70 sec;   INR: 1.02 ratio         PTT - ( 25 Mar 2019 22:30 )  PTT:30.0 sec    Radiology & Additional Studies:     < from: CT Abdomen and Pelvis No Cont (03.15.19 @ 17:25) >  IMPRESSION: Suprapubic catheter again noted within the decompressed   urinary bladder with unchanged mild wall thickening, at least partially   secondary to underdistention. Correlate with urinalysis for cystitis.    New small air foci within the right anterior perineum tracking up along   the anterior aspect of the right rectus abdominis, likely postprocedural   changes.    < end of copied text >    Radiology imaging reviewed.     Impression: 63 M w/ Cerebral palsy from group home, seizure disorder, Spastic paraplegia, fed through PEG tube, BPH, Bladder neck stricture s/p SPT, Asthma, Nephrolithiasis, Osteoporosis, is sent in from group home SPT leaking x 1day.       ASSESSMENT/ PLAN:   - consulted for upsizing of SPT from 14Fr to 26Fr   - patient had initial SPT placed at [facility], came to [facility] d/t clog   - cystogram completed on 3/15, contrast filled the urethra and b/l ureters making upsizing difficult and decision was made to exchange  - wire/catheter could not be passed along original SPT to keep access so tube was removed and replaced with a new 14Fr catheter   - too soon to upsize catheter at this time - need to allow tract to form   - please follow up in 1 month for upsizing     Thank you for the courtesy of this consult, please call [phone] with any further questions. INTERVENTIONAL RADIOLOGY CONSULT:     Procedure Requested: SPT upsize 14Fr to 26Fr    HPI:  63 M w/ Cerebral palsy from group home, seizure disorder, Spastic paraplegia, fed through PEG tube, BPH, Bladder neck stricture s/p SPT, Asthma, Nephrolithiasis, Osteoporosis, is sent in from group home SPT leaking x 1day.   Urology note - > Pt had SPT placed by [facility] 6 weeks ago, it was dislodged and replaced at [facility] by IR 3/15/19, pt was brought to ED because spt was not draining, it was clogged by sediment. Pt known to us and he chronically has urine sediment, that is why his spt needs to be larger than 14 fr. Staff a group home are not allowed to irrigate spt and this will be a recurring issue going forward. (26 Mar 2019 03:41)      PAST MEDICAL & SURGICAL HISTORY:  Asthma  Urinary retention  Urinary calculi  Spastic quadriplegia  Osteoporosis  Seizure  Cerebral palsy  BPH (benign prostatic hyperplasia)  H/O cystoscopy  S/P percutaneous endoscopic gastrostomy (PEG) tube placement      MEDICATIONS  (STANDING):  artificial  tears Solution 1 Drop(s) Both EYES four times a day  baclofen 10 milliGRAM(s) Oral three times a day  calcium carbonate   1250 mG (OsCal) 1 Tablet(s) Oral two times a day  chlorhexidine 0.12% Liquid 15 milliLiter(s) Oral Mucosa two times a day  chlorhexidine 4% Liquid 1 Application(s) Topical <User Schedule>  docusate sodium 100 milliGRAM(s) Oral daily  enoxaparin Injectable 40 milliGRAM(s) SubCutaneous every 24 hours  finasteride 5 milliGRAM(s) Oral daily  oxybutynin 10 milliGRAM(s) Oral daily  PHENobarbital 64.8 milliGRAM(s) Oral daily  tamsulosin 0.4 milliGRAM(s) Oral at bedtime    MEDICATIONS  (PRN):  ALBUTerol    0.083%. 2.5 milliGRAM(s) Nebulizer once PRN Shortness of Breath and/or Wheezing  ALBUTerol    90 MICROgram(s) HFA Inhaler 2 Puff(s) Inhalation every 6 hours PRN Shortness of Breath and/or Wheezing  lactulose Syrup 20 Gram(s) Oral daily PRN If patient has not had a bowel movement in 2 days      Allergies    No Known Allergies    Intolerances        Social History:   Smoking: Yes [ ]  No [ ]   ______pk yrs  ETOH  Yes [ ]  No [ ]  Social [ ]  DRUGS:  Yes [ ]  No [ ]  if so what______________    FAMILY HISTORY:  Family history unknown      Physical Exam:   Vital Signs Last 24 Hrs  T(C): 36.7 (26 Mar 2019 07:19), Max: 36.7 (26 Mar 2019 07:19)  T(F): 98 (26 Mar 2019 07:19), Max: 98 (26 Mar 2019 07:19)  HR: 73 (26 Mar 2019 07:19) (66 - 73)  BP: 132/65 (26 Mar 2019 07:19) (132/65 - 172/66)  BP(mean): --  RR: 20 (26 Mar 2019 07:19) (20 - 20)  SpO2: 98% (26 Mar 2019 07:19) (98% - 98%)      Labs:                         13.5   6.05  )-----------( 217      ( 25 Mar 2019 22:30 )             38.2     03-25    139  |  103  |  20  ----------------------------<  96  4.0   |  25  |  0.5<L>    Ca    8.9      25 Mar 2019 22:30    TPro  6.6  /  Alb  4.0  /  TBili  0.2  /  DBili  x   /  AST  19  /  ALT  16  /  AlkPhos  87  03-25    PT/INR - ( 25 Mar 2019 22:30 )   PT: 11.70 sec;   INR: 1.02 ratio         PTT - ( 25 Mar 2019 22:30 )  PTT:30.0 sec    Pertinent labs:                      13.5   6.05  )-----------( 217      ( 25 Mar 2019 22:30 )             38.2       03-25    139  |  103  |  20  ----------------------------<  96  4.0   |  25  |  0.5<L>    Ca    8.9      25 Mar 2019 22:30    TPro  6.6  /  Alb  4.0  /  TBili  0.2  /  DBili  x   /  AST  19  /  ALT  16  /  AlkPhos  87  03-25      PT/INR - ( 25 Mar 2019 22:30 )   PT: 11.70 sec;   INR: 1.02 ratio         PTT - ( 25 Mar 2019 22:30 )  PTT:30.0 sec    Radiology & Additional Studies:     < from: CT Abdomen and Pelvis No Cont (03.15.19 @ 17:25) >  IMPRESSION: Suprapubic catheter again noted within the decompressed   urinary bladder with unchanged mild wall thickening, at least partially   secondary to underdistention. Correlate with urinalysis for cystitis.    New small air foci within the right anterior perineum tracking up along   the anterior aspect of the right rectus abdominis, likely postprocedural   changes.    < end of copied text >    Radiology imaging reviewed.     Impression: 63 M w/ Cerebral palsy from group home, seizure disorder, Spastic paraplegia, fed through PEG tube, BPH, Bladder neck stricture s/p SPT, Asthma, Nephrolithiasis, Osteoporosis, is sent in from group home SPT leaking x 1day.       ASSESSMENT/ PLAN:   - consulted for upsizing of SPT from 14Fr to 26Fr   - patient had initial SPT placed at [facility], came to [facility] d/t clog   - cystogram completed on 3/15, contrast filled the urethra and b/l ureters making upsizing difficult and decision was made to exchange  - wire/catheter could not be passed along original SPT to keep access so tube was removed and replaced with a new 14Fr catheter   - too soon to upsize catheter at this time - need to allow tract to form   - please follow up in 1 month from original placement  for upsizing     Thank you for the courtesy of this consult, please call [phone] with any further questions.

## 2019-03-26 NOTE — CONSULT NOTE ADULT - SUBJECTIVE AND OBJECTIVE BOX
64 yo male well known to  service, pt had 14fr pigtail SPT placed in [facility] approximately 6 weeks ago, pt presented to ED 3/15/19 with dislodged SPT, IR planned on upsizing it, but were unable to and replaced it with a new 14fr pigtail SPT. Pt lives in a group home and was brought to ED due to no uop for the whole day. ED attng performed bedside bladder scan which showed > 400cc of urine in bladder    PAST MEDICAL & SURGICAL HISTORY:  Asthma  Urinary retention  Urinary calculi  Spastic quadriplegia  Osteoporosis  Seizure  Cerebral palsy  BPH (benign prostatic hyperplasia)  H/O cystoscopy  S/P percutaneous endoscopic gastrostomy (PEG) tube placement    Home Medications:  baclofen 10 mg oral tablet: 1 tab(s) orally 3 times a day (14 Mar 2019 23:09)  finasteride 5 mg oral tablet: 1 tab(s) orally once a day (13 Dec 2018 15:02)  lactulose:  (13 Dec 2018 15:02)  PHENobarbital 32.4 mg oral tablet: 2 tab(s) orally once a day (13 Dec 2018 15:02)  tamsulosin 0.4 mg oral capsule: 1 cap(s) orally once a day (13 Dec 2018 15:02)    Allergies    No Known Allergies    Intolerances    SHx - group home resident    FHx - unknown    Vital Signs Last 24 Hrs  T(C): 36.1 (25 Mar 2019 20:21), Max: 36.1 (25 Mar 2019 20:21)  T(F): 97 (25 Mar 2019 20:21), Max: 97 (25 Mar 2019 20:21)  HR: 66 (25 Mar 2019 20:21) (66 - 66)  BP: 172/66 (25 Mar 2019 20:21) (172/66 - 172/66)  RR: 20 (25 Mar 2019 20:21) (20 - 20)  SpO2: 98% (25 Mar 2019 20:21) (98% - 98%)    pt seen and examined at bedside  a+ox2, nad, non toxic  abd - soft, +PEG, nd, nttp, + distended bladder  gu - +14 fr pigtail SPT, anchor stitch intact, spt flushed with 20 cc NS, 500 cc yellow urine with sediment evacuated, bladder not palpable                          13.5   6.05  )-----------( 217      ( 25 Mar 2019 22:30 )             38.2   03-25    139  |  103  |  20  ----------------------------<  96  4.0   |  25  |  0.5<L>    Ca    8.9      25 Mar 2019 22:30    TPro  6.6  /  Alb  4.0  /  TBili  0.2  /  DBili  x   /  AST  19  /  ALT  16  /  AlkPhos  87  03-25

## 2019-03-26 NOTE — PROGRESS NOTE ADULT - ASSESSMENT
64 yo male with cerebral palsy, group home resident, known to  service. Pt had SPT placed by [facility] 6 weeks ago, it was dislodged and replaced at [facility] by IR 3/15/19, pt was brought to ED SPT not draining  2/2 clogged by sediment, s/p irrigation with + UO.    Plan:  Please start balanitis care, wash with betadine solution rinse well and apply topical clotrimazole, use cotton tip applicators to clean under the prepuce.  Irrigate SPT prn  F/U IR for SPT Upsizing in April.   Dr. Cooper will F/U

## 2019-03-26 NOTE — CONSULT NOTE ADULT - ATTENDING COMMENTS
I personally saw and examined the patient, reviewed the chart and available data. I discussed the situation with the patients aide and [facility] staff.. I also reviewed and/or amended the note as necessary.

## 2019-03-26 NOTE — CHART NOTE - NSCHARTNOTEFT_GEN_A_CORE
pt seen and examined   vitally stable   but distended abd noticed   c/o urine leaking from penis and v less draining   as per Urology no intervention   and as per IR pt has to wait for 3 weeks to up size catheter   pt got 1 enema and had a BM but still distended     PLAN;  will gi pt seen and examined   vitally stable   but distended abd noticed   c/o urine leaking from penis and v less draining   as per Urology no intervention   and as per IR pt has to wait for 3 weeks to up size catheter   pt got 1 enema and had a BM but still distended     PLAN;  will get another enema and will do a CT abd with PO contrast   will cw irrigation of  SPC

## 2019-03-26 NOTE — CHART NOTE - NSCHARTNOTEFT_GEN_A_CORE
I spoke with RN in charge of Mr Chaparro at [facility] [place]  The problem is not that they are unable to flush SPT, they did not have the proper syringes to flush so pt was sent in.  nursing care is able to properly take care of peg and SPT- when pt is medically clear can return to group home - SNf not needed    Pt will need Leurlock prefilled saline syringes for flush - can be done q12 daily  need to send rx to [facility]  [address] [place] [address]    please arrange with case management team

## 2019-03-26 NOTE — H&P ADULT - HISTORY OF PRESENT ILLNESS
63 M w/ Cerebral palsy from group home, seizure disorder, Spastic paraplegia, fed through PEG tube, BPH, Bladder neck stricture s/p SPT, Asthma, Nephrolithiasis, Osteoporosis, is sent in from group home SPT leaking x 1day.   Urology note - > Pt had SPT placed by [facility] 6 weeks ago, it was dislodged and replaced at [facility] by IR 3/15/19, pt was brought to ED because spt was not draining, it was clogged by sediment. Pt known to us and he chronically has urine sediment, that is why his spt needs to be larger than 14 fr. Staff a group home are not allowed to irrigate spt and this will be a recurring issue going forward.

## 2019-03-27 ENCOUNTER — TRANSCRIPTION ENCOUNTER (OUTPATIENT)
Age: 64
End: 2019-03-27

## 2019-03-27 VITALS — OXYGEN SATURATION: 94 %

## 2019-03-27 LAB
ALBUMIN SERPL ELPH-MCNC: 3.6 G/DL — SIGNIFICANT CHANGE UP (ref 3.5–5.2)
ALP SERPL-CCNC: 78 U/L — SIGNIFICANT CHANGE UP (ref 30–115)
ALT FLD-CCNC: 17 U/L — SIGNIFICANT CHANGE UP (ref 0–41)
ANION GAP SERPL CALC-SCNC: 11 MMOL/L — SIGNIFICANT CHANGE UP (ref 7–14)
AST SERPL-CCNC: 19 U/L — SIGNIFICANT CHANGE UP (ref 0–41)
BASOPHILS # BLD AUTO: 0.03 K/UL — SIGNIFICANT CHANGE UP (ref 0–0.2)
BASOPHILS NFR BLD AUTO: 0.6 % — SIGNIFICANT CHANGE UP (ref 0–1)
BILIRUB SERPL-MCNC: 0.4 MG/DL — SIGNIFICANT CHANGE UP (ref 0.2–1.2)
BUN SERPL-MCNC: 16 MG/DL — SIGNIFICANT CHANGE UP (ref 10–20)
CALCIUM SERPL-MCNC: 9 MG/DL — SIGNIFICANT CHANGE UP (ref 8.5–10.1)
CHLORIDE SERPL-SCNC: 108 MMOL/L — SIGNIFICANT CHANGE UP (ref 98–110)
CO2 SERPL-SCNC: 25 MMOL/L — SIGNIFICANT CHANGE UP (ref 17–32)
CREAT SERPL-MCNC: 0.5 MG/DL — LOW (ref 0.7–1.5)
EOSINOPHIL # BLD AUTO: 0.31 K/UL — SIGNIFICANT CHANGE UP (ref 0–0.7)
EOSINOPHIL NFR BLD AUTO: 6.6 % — SIGNIFICANT CHANGE UP (ref 0–8)
GLUCOSE SERPL-MCNC: 79 MG/DL — SIGNIFICANT CHANGE UP (ref 70–99)
HCT VFR BLD CALC: 36.1 % — LOW (ref 42–52)
HGB BLD-MCNC: 12.3 G/DL — LOW (ref 14–18)
IMM GRANULOCYTES NFR BLD AUTO: 0.2 % — SIGNIFICANT CHANGE UP (ref 0.1–0.3)
LYMPHOCYTES # BLD AUTO: 1.18 K/UL — LOW (ref 1.2–3.4)
LYMPHOCYTES # BLD AUTO: 25.2 % — SIGNIFICANT CHANGE UP (ref 20.5–51.1)
MAGNESIUM SERPL-MCNC: 2 MG/DL — SIGNIFICANT CHANGE UP (ref 1.8–2.4)
MCHC RBC-ENTMCNC: 31.4 PG — HIGH (ref 27–31)
MCHC RBC-ENTMCNC: 34.1 G/DL — SIGNIFICANT CHANGE UP (ref 32–37)
MCV RBC AUTO: 92.1 FL — SIGNIFICANT CHANGE UP (ref 80–94)
MONOCYTES # BLD AUTO: 0.56 K/UL — SIGNIFICANT CHANGE UP (ref 0.1–0.6)
MONOCYTES NFR BLD AUTO: 11.9 % — HIGH (ref 1.7–9.3)
NEUTROPHILS # BLD AUTO: 2.6 K/UL — SIGNIFICANT CHANGE UP (ref 1.4–6.5)
NEUTROPHILS NFR BLD AUTO: 55.5 % — SIGNIFICANT CHANGE UP (ref 42.2–75.2)
NRBC # BLD: 0 /100 WBCS — SIGNIFICANT CHANGE UP (ref 0–0)
PLATELET # BLD AUTO: 185 K/UL — SIGNIFICANT CHANGE UP (ref 130–400)
POTASSIUM SERPL-MCNC: 4.1 MMOL/L — SIGNIFICANT CHANGE UP (ref 3.5–5)
POTASSIUM SERPL-SCNC: 4.1 MMOL/L — SIGNIFICANT CHANGE UP (ref 3.5–5)
PROT SERPL-MCNC: 6 G/DL — SIGNIFICANT CHANGE UP (ref 6–8)
RBC # BLD: 3.92 M/UL — LOW (ref 4.7–6.1)
RBC # FLD: 13.2 % — SIGNIFICANT CHANGE UP (ref 11.5–14.5)
SODIUM SERPL-SCNC: 144 MMOL/L — SIGNIFICANT CHANGE UP (ref 135–146)
WBC # BLD: 4.69 K/UL — LOW (ref 4.8–10.8)
WBC # FLD AUTO: 4.69 K/UL — LOW (ref 4.8–10.8)

## 2019-03-27 RX ORDER — SODIUM CHLORIDE 9 MG/ML
0 INJECTION INTRAMUSCULAR; INTRAVENOUS; SUBCUTANEOUS
Qty: 0 | Refills: 0 | COMMUNITY

## 2019-03-27 RX ORDER — POVIDONE-IODINE 5 %
1 AEROSOL (ML) TOPICAL
Qty: 0 | Refills: 0 | COMMUNITY
Start: 2019-03-27 | End: 2019-04-03

## 2019-03-27 RX ORDER — CHLORHEXIDINE GLUCONATE 213 G/1000ML
30 SOLUTION TOPICAL
Qty: 0 | Refills: 0 | COMMUNITY

## 2019-03-27 RX ORDER — POVIDONE-IODINE 5 %
1 AEROSOL (ML) TOPICAL
Qty: 0 | Refills: 0 | Status: DISCONTINUED | OUTPATIENT
Start: 2019-03-27 | End: 2019-03-27

## 2019-03-27 RX ORDER — DOCUSATE SODIUM 100 MG
100 CAPSULE ORAL
Qty: 0 | Refills: 0 | COMMUNITY

## 2019-03-27 RX ADMIN — Medication 1 DROP(S): at 06:42

## 2019-03-27 RX ADMIN — Medication 1 DROP(S): at 00:37

## 2019-03-27 RX ADMIN — CHLORHEXIDINE GLUCONATE 15 MILLILITER(S): 213 SOLUTION TOPICAL at 06:42

## 2019-03-27 RX ADMIN — Medication 10 MILLIGRAM(S): at 11:35

## 2019-03-27 RX ADMIN — Medication 1 TABLET(S): at 06:42

## 2019-03-27 RX ADMIN — Medication 1 DROP(S): at 11:35

## 2019-03-27 RX ADMIN — Medication 10 MILLIGRAM(S): at 06:42

## 2019-03-27 RX ADMIN — Medication 10 MILLIGRAM(S): at 13:08

## 2019-03-27 RX ADMIN — CHLORHEXIDINE GLUCONATE 1 APPLICATION(S): 213 SOLUTION TOPICAL at 06:42

## 2019-03-27 RX ADMIN — Medication 64.8 MILLIGRAM(S): at 11:54

## 2019-03-27 NOTE — DISCHARGE NOTE PROVIDER - NSDCCPCAREPLAN_GEN_ALL_CORE_FT
PRINCIPAL DISCHARGE DIAGNOSIS  Diagnosis: Suprapubic catheter dysfunction  Assessment and Plan of Treatment: Pt was brought in the hospital due to malfunctioning supra pubic catheter.   SPT was placed in [facility] about 6 weeks ago.  Cystogram was done on 3/15 showed contrast filled the urethra and bilateral ureters making upsizing difficult and decision was made to exchange the catheter instead.  - wire/catheter could not be passed along original SPT to keep access so tube was removed and replaced with a new 14Fr catheter.  As per IR, it is too soon to upsize catheter at this time - need to allow tract to form so they recommend follow up in 1 month from original placement for upsizing.  Urology is on board and recommend treatment for Balanitis i.e. Betadine solution rinse and apply topical clotrimazole, use cotton tip applicators to clean under the prepuce for a week.  Follow upwith urology and IR in a month to upsize the catheter      SECONDARY DISCHARGE DIAGNOSES  Diagnosis: Balanitis  Assessment and Plan of Treatment: Urology recommended to use betadine rinse then topical clotrimazole and to use cotton tip applicators to clean under the prepuce for a week. PRINCIPAL DISCHARGE DIAGNOSIS  Diagnosis: Suprapubic catheter dysfunction  Assessment and Plan of Treatment: Pt was brought in the hospital due to malfunctioning supra pubic catheter.   SPT was placed in [facility] about 6 weeks ago.  Cystogram was done on 3/15 showed contrast filled the urethra and bilateral ureters making upsizing difficult and decision was made to exchange the catheter instead.  - wire/catheter could not be passed along original SPT to keep access so tube was removed and replaced with a new 14Fr catheter.  As per IR, it is too soon to upsize catheter at this time - need to allow tract to form so they recommend follow up in 1 month from original placement for upsizing.  Nursing staff in the hospital had no problem flushing the catheter.  spoke with RN in charge, Mr Chaparro at [facility]  The problem is not that they were unable to flush SPT, they did not have the proper syringes to flush so pt was sent in.  nursing care is able to properly take care of peg and SPT.  Follow up with urology and IR in a month to upsize the catheter      SECONDARY DISCHARGE DIAGNOSES  Diagnosis: Balanitis  Assessment and Plan of Treatment: Urology recommended to use betadine rinse then topical clotrimazole and to use cotton tip applicators to clean under the prepuce for a week. PRINCIPAL DISCHARGE DIAGNOSIS  Diagnosis: Suprapubic catheter dysfunction  Assessment and Plan of Treatment: Pt was brought in the hospital due to malfunctioning supra pubic catheter.   SPT was placed in [facility] about 6 weeks ago.  Cystogram was done on 3/15 showed contrast filled the urethra and bilateral ureters making upsizing difficult and decision was made to exchange the catheter instead.  - wire/catheter could not be passed along original SPT to keep access so tube was removed and replaced with a new 14Fr catheter.  As per IR, it is too soon to upsize catheter at this time - need to allow tract to form so they recommend follow up in 1 month from original placement for upsizing.  Nursing staff in the hospital had no problem flushing the catheter.  spoke with RN in charge, Mr Chaparro at [facility]  The problem is not that they were unable to flush SPT, they did not have the proper syringes to flush so pt was sent in.  nursing care is able to properly take care of peg and SPT.  Advised to irrigate the supra pubic catheter as needed.  Follow up with urology and IR in a month to upsize the catheter      SECONDARY DISCHARGE DIAGNOSES  Diagnosis: Balanitis  Assessment and Plan of Treatment: Urology recommended to use betadine rinse then topical clotrimazole and to use cotton tip applicators to clean under the prepuce for a week. PRINCIPAL DISCHARGE DIAGNOSIS  Diagnosis: Suprapubic catheter dysfunction  Assessment and Plan of Treatment: Pt was brought in the hospital due to malfunctioning supra pubic catheter.   SPT was placed in [facility] about 6 weeks ago.  Cystogram was done on 3/15 showed contrast filled the urethra and bilateral ureters making upsizing difficult and decision was made to exchange the catheter instead.  - wire/catheter could not be passed along original SPT to keep access so tube was removed and replaced with a new 14Fr catheter.  As per IR, it is too soon to upsize catheter at this time - need to allow tract to form so they recommend follow up in 1 month from original placement for upsizing.  Nursing staff in the hospital had no problem flushing the catheter.  spoke with RN in charge, Mr Chaparro at [facility]  The problem is not that they were unable to flush SPT, they did not have the proper syringes to flush so pt was sent in.  nursing care is able to properly take care of peg and SPT.  Advised to irrigate the supra pubic catheter as needed for low urinary output  Follow up with urology and IR in a month to upsize the catheter      SECONDARY DISCHARGE DIAGNOSES  Diagnosis: Balanitis  Assessment and Plan of Treatment: Urology recommended to use betadine rinse then topical clotrimazole and to use cotton tip applicators to clean under the prepuce for a week.

## 2019-03-27 NOTE — DISCHARGE NOTE PROVIDER - HOSPITAL COURSE
63 M w/ Cerebral palsy from group home, seizure disorder, Spastic paraplegia, fed through PEG tube, BPH, Bladder neck stricture s/p SPT, Asthma, Nephrolithiasis, Osteoporosis, is sent in from group home SPT leaking x 1day and no urine ouput for 1 day..  Pt had SPT placed by [facility] 6 weeks ago, it was dislodged and replaced at [facility] by IR 3/15/19,  IR planned on upsizing it, but were unable to and replaced it with a new 14fr pigtail SPT.     ED attending performed bedside bladder scan which showed > 400cc of urine in bladder            >>> Pt was brought in the hospital due to malfunctioning supra pubic catheter.     SPT was placed in [facility] about 6 weeks ago.    Cystogram was done on 3/15 showed contrast filled the urethra and b/l ureters making upsizing difficult and decision was made to exchange the catheter instead.    - wire/catheter could not be passed along original SPT to keep access so tube was removed and replaced with a new 14Fr catheter     As per IR, it is too soon to upsize catheter at this time - need to allow tract to form so they recommend follow up in 1 month from original placement for upsizing.            >>>Pt was found to have Balanitis.     Urology is on board and recommend treatment for Balanitis I.e. Betadine solution rinse and apply topical clotrimazole, use cotton tip applicators to clean under the prepuce for a week. 63 M w/ Cerebral palsy from group home, seizure disorder, Spastic paraplegia, fed through PEG tube, BPH, Bladder neck stricture s/p SPT, Asthma, Nephrolithiasis, Osteoporosis, is sent in from group home SPT leaking x 1day and no urine ouput for 1 day..  Pt had SPT placed by [facility] 6 weeks ago, it was dislodged and replaced at [facility] by IR 3/15/19,  IR planned on upsizing it, but were unable to and replaced it with a new 14fr pigtail SPT.     ED attending performed bedside bladder scan which showed > 400cc of urine in bladder            >>> Pt was brought in the hospital due to malfunctioning supra pubic catheter.     SPT was placed in [facility] about 6 weeks ago.    Cystogram was done on 3/15 showed contrast filled the urethra and b/l ureters making upsizing difficult and decision was made to exchange the catheter instead.    - wire/catheter could not be passed along original SPT to keep access so tube was removed and replaced with a new 14Fr catheter     As per IR, it is too soon to upsize catheter at this time - need to allow tract to form so they recommend follow up in 1 month from original placement for upsizing.    Nursing staff in the hospital had no problem flushing the catheter.    spoke with RN in charge, Mr Chaparro at [facility]    The problem is not that they were unable to flush SPT, they did not have the proper syringes to flush so pt was sent in.    nursing care is able to properly take care of peg and SPT                >>>Pt was found to have Balanitis.     Urology is on board and recommend treatment for Balanitis I.e. Betadine solution rinse and apply topical clotrimazole, use cotton tip applicators to clean under the prepuce for a week.

## 2019-03-27 NOTE — DISCHARGE NOTE PROVIDER - CARE PROVIDERS DIRECT ADDRESSES
[email or web address]

## 2019-03-27 NOTE — DISCHARGE NOTE NURSING/CASE MANAGEMENT/SOCIAL WORK - NSDCDPATPORTLINK_GEN_ALL_CORE
You can access the [facility] Patient Portal, offered by [facility], by registering with the following website: [email or web address]

## 2019-03-27 NOTE — DISCHARGE NOTE PROVIDER - CARE PROVIDER_API CALL
Bonnie Cooper)  Urology  [address] [place] [address], [address]  [place] [address]  Phone: [phone]  Fax: [phone]  Follow Up Time:     Max Wilkes)  Radiology  [address] [place] [address]  Phone: [phone]  Fax: [phone]  Follow Up Time:

## 2019-03-27 NOTE — PROGRESS NOTE ADULT - SUBJECTIVE AND OBJECTIVE BOX
SUBJECTIVE:    Patient is a 63y old Male who presents with a chief complaint of SPT (26 Mar 2019 08:35)      HPI:  63 M w/ Cerebral palsy from group home, seizure disorder, Spastic paraplegia, fed through PEG tube, BPH, Bladder neck stricture s/p SPT, Asthma, Nephrolithiasis, Osteoporosis, is sent in from group home SPT leaking x 1day.   Urology note - > Pt had SPT placed by [facility] 6 weeks ago, it was dislodged and replaced at [facility] by IR 3/15/19, pt was brought to ED because spt was not draining, it was clogged by sediment. Pt known to us and he chronically has urine sediment, that is why his spt needs to be larger than 14 fr. Staff a group home are not allowed to irrigate spt and this will be a recurring issue going forward. (26 Mar 2019 03:41)    Currently admitted to medicine with the primary diagnosis of Suprapubic catheter dysfunction   Patient seen and examined at bedside. No acute events reported overnight. Group home attendant at bedside. He reports that the patient has been having no leakage and no abdominal distension noted.    Besides the pertinent positives and negatives described above, the ROS was within normal limits.    PAST MEDICAL & SURGICAL HISTORY  Asthma  Urinary retention  Urinary calculi  Spastic quadriplegia  Osteoporosis  Seizure  Cerebral palsy  BPH (benign prostatic hyperplasia)  H/O cystoscopy  S/P percutaneous endoscopic gastrostomy (PEG) tube placement    SOCIAL HISTORY:    ALLERGIES:  No Known Allergies    MEDICATIONS:  STANDING MEDICATIONS  artificial  tears Solution 1 Drop(s) Both EYES four times a day  baclofen 10 milliGRAM(s) Oral three times a day  calcium carbonate   1250 mG (OsCal) 1 Tablet(s) Oral two times a day  chlorhexidine 0.12% Liquid 15 milliLiter(s) Oral Mucosa two times a day  chlorhexidine 4% Liquid 1 Application(s) Topical <User Schedule>  clotrimazole 1% Cream 1 Application(s) Topical <User Schedule>  docusate sodium 100 milliGRAM(s) Oral daily  enoxaparin Injectable 40 milliGRAM(s) SubCutaneous every 24 hours  finasteride 5 milliGRAM(s) Oral daily  oxybutynin 10 milliGRAM(s) Oral daily  PHENobarbital 64.8 milliGRAM(s) Oral daily  povidone iodine 10% Solution 1 Application(s) Topical <User Schedule>  tamsulosin 0.4 milliGRAM(s) Oral at bedtime    PRN MEDICATIONS  ALBUTerol    0.083%. 2.5 milliGRAM(s) Nebulizer once PRN  ALBUTerol    90 MICROgram(s) HFA Inhaler 2 Puff(s) Inhalation every 6 hours PRN  lactulose Syrup 20 Gram(s) Oral daily PRN    VITALS:   T(F): 97.7  HR: 88  BP: 139/66  RR: 20  SpO2: 94%    LABS:                        12.3   4.69  )-----------( 185      ( 27 Mar 2019 06:54 )             36.1     03-27    144  |  108  |  16  ----------------------------<  79  4.1   |  25  |  0.5<L>    Ca    9.0      27 Mar 2019 06:54  Mg     2.0     03-27    TPro  6.0  /  Alb  3.6  /  TBili  0.4  /  DBili  x   /  AST  19  /  ALT  17  /  AlkPhos  78  03-27    PT/INR - ( 25 Mar 2019 22:30 )   PT: 11.70 sec;   INR: 1.02 ratio         PTT - ( 25 Mar 2019 22:30 )  PTT:30.0 sec    RADIOLOGY:  < from: CT Abdomen and Pelvis w/ Oral Cont (03.26.19 @ 23:53) >    No evidence of bowel obstruction or pneumoperitoneum.    Moderate stool load throughout the colon.    Stable suprapubic catheter within a collapsed urinary bladder.      PHYSICAL EXAM:  GEN: No acute distress, patient non communicative at baseline   LUNGS: Clear to auscultation bilaterally   HEART: Regular  ABD: Soft, non-tender, non-distended.  EXT: No edema   NEURO: alert     Intravenous access: Y  NG tube: N  Edouard Catheter: Y

## 2019-03-27 NOTE — PROGRESS NOTE ADULT - ASSESSMENT
63 M w/ Cerebral palsy from group home, seizure disorder, Spastic paraplegia, fed through PEG tube, BPH, Bladder neck stricture s/p SPT, Asthma, Nephrolithiasis, Osteoporosis, is sent in from group home SPT leaking x 1day.     # Malfunctioning SPT  - cystogram completed on 3/15, contrast filled the urethra and b/l ureters  - IR consult for replacement for upsizing SPT to 26 fr, currently patient has 14fr and per IR, upsizing to 26fr can only be done after 1 month   - per urology,  wire/catheter could not be passed along original SPT to keep access so tube was removed and replaced with a new 14Fr catheter   - At group home, did not have proper flushes for irrigation, therefore sending script for flushes     # Cerebral palsy / seizure disorder / Spastic paraplegia / PEG tube / BPH / Asthma / Osteoporosis  - continue Home meds (complete list is in paper chart)-->  Phenobarbital, Flomax, oxybutynin, finasteride, colace, lactulose prn, oscal, baclofen, artificial tears, albuterol  - PEG feeds - > Jevity 1.2. 237ml via gtube at 6am /10 am /2pm / 6pm / 10pm.                          50 cc flushes of water w/ each feed.                         # DVTPPX: Lovenox  # CHG  # Diet per Grp home tube feeds  # Dispo: Grp home  # Full code

## 2019-04-05 ENCOUNTER — INPATIENT (INPATIENT)
Facility: HOSPITAL | Age: 64
LOS: 6 days | Discharge: HOME HEALTH | End: 2019-04-12
Attending: INTERNAL MEDICINE | Admitting: INTERNAL MEDICINE
Payer: MEDICARE

## 2019-04-05 VITALS
SYSTOLIC BLOOD PRESSURE: 148 MMHG | OXYGEN SATURATION: 95 % | HEART RATE: 114 BPM | RESPIRATION RATE: 18 BRPM | DIASTOLIC BLOOD PRESSURE: 83 MMHG | TEMPERATURE: 100 F

## 2019-04-05 DIAGNOSIS — Z93.59 OTHER CYSTOSTOMY STATUS: Chronic | ICD-10-CM

## 2019-04-05 DIAGNOSIS — Z98.890 OTHER SPECIFIED POSTPROCEDURAL STATES: Chronic | ICD-10-CM

## 2019-04-05 DIAGNOSIS — Z93.1 GASTROSTOMY STATUS: Chronic | ICD-10-CM

## 2019-04-05 LAB
ALBUMIN SERPL ELPH-MCNC: 4.1 G/DL — SIGNIFICANT CHANGE UP (ref 3.5–5.2)
ALP SERPL-CCNC: 95 U/L — SIGNIFICANT CHANGE UP (ref 30–115)
ALT FLD-CCNC: 21 U/L — SIGNIFICANT CHANGE UP (ref 0–41)
ANION GAP SERPL CALC-SCNC: 14 MMOL/L — SIGNIFICANT CHANGE UP (ref 7–14)
APPEARANCE UR: ABNORMAL
APTT BLD: 30 SEC — SIGNIFICANT CHANGE UP (ref 27–39.2)
AST SERPL-CCNC: 21 U/L — SIGNIFICANT CHANGE UP (ref 0–41)
BACTERIA # UR AUTO: ABNORMAL /HPF
BASOPHILS # BLD AUTO: 0.03 K/UL — SIGNIFICANT CHANGE UP (ref 0–0.2)
BASOPHILS NFR BLD AUTO: 0.4 % — SIGNIFICANT CHANGE UP (ref 0–1)
BILIRUB SERPL-MCNC: 0.6 MG/DL — SIGNIFICANT CHANGE UP (ref 0.2–1.2)
BILIRUB UR-MCNC: NEGATIVE — SIGNIFICANT CHANGE UP
BUN SERPL-MCNC: 18 MG/DL — SIGNIFICANT CHANGE UP (ref 10–20)
CALCIUM SERPL-MCNC: 9.5 MG/DL — SIGNIFICANT CHANGE UP (ref 8.5–10.1)
CHLORIDE SERPL-SCNC: 104 MMOL/L — SIGNIFICANT CHANGE UP (ref 98–110)
CO2 SERPL-SCNC: 26 MMOL/L — SIGNIFICANT CHANGE UP (ref 17–32)
COLOR SPEC: YELLOW — SIGNIFICANT CHANGE UP
CREAT SERPL-MCNC: 0.6 MG/DL — LOW (ref 0.7–1.5)
DIFF PNL FLD: ABNORMAL
EOSINOPHIL # BLD AUTO: 0.1 K/UL — SIGNIFICANT CHANGE UP (ref 0–0.7)
EOSINOPHIL NFR BLD AUTO: 1.2 % — SIGNIFICANT CHANGE UP (ref 0–8)
EPI CELLS # UR: ABNORMAL /HPF
GLUCOSE SERPL-MCNC: 119 MG/DL — HIGH (ref 70–99)
GLUCOSE UR QL: NEGATIVE MG/DL — SIGNIFICANT CHANGE UP
HCT VFR BLD CALC: 39.6 % — LOW (ref 42–52)
HGB BLD-MCNC: 13.6 G/DL — LOW (ref 14–18)
IMM GRANULOCYTES NFR BLD AUTO: 0.2 % — SIGNIFICANT CHANGE UP (ref 0.1–0.3)
INR BLD: 1.09 RATIO — SIGNIFICANT CHANGE UP (ref 0.65–1.3)
KETONES UR-MCNC: NEGATIVE — SIGNIFICANT CHANGE UP
LACTATE SERPL-SCNC: 1.3 MMOL/L — SIGNIFICANT CHANGE UP (ref 0.5–2.2)
LEUKOCYTE ESTERASE UR-ACNC: ABNORMAL
LIDOCAIN IGE QN: 18 U/L — SIGNIFICANT CHANGE UP (ref 7–60)
LYMPHOCYTES # BLD AUTO: 0.86 K/UL — LOW (ref 1.2–3.4)
LYMPHOCYTES # BLD AUTO: 10.5 % — LOW (ref 20.5–51.1)
MAGNESIUM SERPL-MCNC: 1.9 MG/DL — SIGNIFICANT CHANGE UP (ref 1.8–2.4)
MCHC RBC-ENTMCNC: 31.1 PG — HIGH (ref 27–31)
MCHC RBC-ENTMCNC: 34.3 G/DL — SIGNIFICANT CHANGE UP (ref 32–37)
MCV RBC AUTO: 90.4 FL — SIGNIFICANT CHANGE UP (ref 80–94)
MONOCYTES # BLD AUTO: 0.93 K/UL — HIGH (ref 0.1–0.6)
MONOCYTES NFR BLD AUTO: 11.3 % — HIGH (ref 1.7–9.3)
NEUTROPHILS # BLD AUTO: 6.26 K/UL — SIGNIFICANT CHANGE UP (ref 1.4–6.5)
NEUTROPHILS NFR BLD AUTO: 76.4 % — HIGH (ref 42.2–75.2)
NITRITE UR-MCNC: NEGATIVE — SIGNIFICANT CHANGE UP
NRBC # BLD: 0 /100 WBCS — SIGNIFICANT CHANGE UP (ref 0–0)
PH UR: 8.5 — SIGNIFICANT CHANGE UP (ref 5–8)
PHOSPHATE SERPL-MCNC: 3.1 MG/DL — SIGNIFICANT CHANGE UP (ref 2.1–4.9)
PLATELET # BLD AUTO: 183 K/UL — SIGNIFICANT CHANGE UP (ref 130–400)
POTASSIUM SERPL-MCNC: 4 MMOL/L — SIGNIFICANT CHANGE UP (ref 3.5–5)
POTASSIUM SERPL-SCNC: 4 MMOL/L — SIGNIFICANT CHANGE UP (ref 3.5–5)
PROT SERPL-MCNC: 7.4 G/DL — SIGNIFICANT CHANGE UP (ref 6–8)
PROT UR-MCNC: >=300 MG/DL
PROTHROM AB SERPL-ACNC: 12.5 SEC — SIGNIFICANT CHANGE UP (ref 9.95–12.87)
RBC # BLD: 4.38 M/UL — LOW (ref 4.7–6.1)
RBC # FLD: 13.2 % — SIGNIFICANT CHANGE UP (ref 11.5–14.5)
RBC CASTS # UR COMP ASSIST: SIGNIFICANT CHANGE UP /HPF
SODIUM SERPL-SCNC: 144 MMOL/L — SIGNIFICANT CHANGE UP (ref 135–146)
SP GR SPEC: 1.01 — SIGNIFICANT CHANGE UP (ref 1.01–1.03)
TRI-PHOS CRY UR QL COMP ASSIST: ABNORMAL /HPF
UROBILINOGEN FLD QL: 0.2 MG/DL — SIGNIFICANT CHANGE UP (ref 0.2–0.2)
WBC # BLD: 8.2 K/UL — SIGNIFICANT CHANGE UP (ref 4.8–10.8)
WBC # FLD AUTO: 8.2 K/UL — SIGNIFICANT CHANGE UP (ref 4.8–10.8)
WBC UR QL: ABNORMAL /HPF

## 2019-04-05 PROCEDURE — 99152 MOD SED SAME PHYS/QHP 5/>YRS: CPT

## 2019-04-05 PROCEDURE — 74018 RADEX ABDOMEN 1 VIEW: CPT | Mod: 26

## 2019-04-05 PROCEDURE — 99285 EMERGENCY DEPT VISIT HI MDM: CPT | Mod: GC

## 2019-04-05 PROCEDURE — 50432 PLMT NEPHROSTOMY CATHETER: CPT | Mod: RT

## 2019-04-05 PROCEDURE — 71045 X-RAY EXAM CHEST 1 VIEW: CPT | Mod: 26

## 2019-04-05 PROCEDURE — 93010 ELECTROCARDIOGRAM REPORT: CPT

## 2019-04-05 PROCEDURE — 74177 CT ABD & PELVIS W/CONTRAST: CPT | Mod: 26

## 2019-04-05 RX ORDER — ACETAMINOPHEN 500 MG
650 TABLET ORAL ONCE
Qty: 0 | Refills: 0 | Status: COMPLETED | OUTPATIENT
Start: 2019-04-05 | End: 2019-04-05

## 2019-04-05 RX ORDER — ACETAMINOPHEN 500 MG
650 TABLET ORAL EVERY 6 HOURS
Qty: 0 | Refills: 0 | Status: DISCONTINUED | OUTPATIENT
Start: 2019-04-05 | End: 2019-04-12

## 2019-04-05 RX ORDER — DOCUSATE SODIUM 100 MG
60 CAPSULE ORAL
Qty: 0 | Refills: 0 | Status: DISCONTINUED | OUTPATIENT
Start: 2019-04-05 | End: 2019-04-12

## 2019-04-05 RX ORDER — FINASTERIDE 5 MG/1
5 TABLET, FILM COATED ORAL DAILY
Qty: 0 | Refills: 0 | Status: DISCONTINUED | OUTPATIENT
Start: 2019-04-05 | End: 2019-04-12

## 2019-04-05 RX ORDER — ALBUTEROL 90 UG/1
2 AEROSOL, METERED ORAL EVERY 6 HOURS
Qty: 0 | Refills: 0 | Status: DISCONTINUED | OUTPATIENT
Start: 2019-04-05 | End: 2019-04-12

## 2019-04-05 RX ORDER — CALCIUM CARBONATE 500(1250)
1 TABLET ORAL
Qty: 0 | Refills: 0 | Status: DISCONTINUED | OUTPATIENT
Start: 2019-04-05 | End: 2019-04-12

## 2019-04-05 RX ORDER — PHENOBARBITAL 60 MG
64.8 TABLET ORAL DAILY
Qty: 0 | Refills: 0 | Status: DISCONTINUED | OUTPATIENT
Start: 2019-04-05 | End: 2019-04-12

## 2019-04-05 RX ORDER — TAMSULOSIN HYDROCHLORIDE 0.4 MG/1
0.4 CAPSULE ORAL AT BEDTIME
Qty: 0 | Refills: 0 | Status: DISCONTINUED | OUTPATIENT
Start: 2019-04-05 | End: 2019-04-12

## 2019-04-05 RX ORDER — SODIUM CHLORIDE 9 MG/ML
500 INJECTION, SOLUTION INTRAVENOUS ONCE
Qty: 0 | Refills: 0 | Status: COMPLETED | OUTPATIENT
Start: 2019-04-05 | End: 2019-04-05

## 2019-04-05 RX ORDER — ACETAMINOPHEN 500 MG
650 TABLET ORAL ONCE
Qty: 0 | Refills: 0 | Status: DISCONTINUED | OUTPATIENT
Start: 2019-04-05 | End: 2019-04-05

## 2019-04-05 RX ORDER — MORPHINE SULFATE 50 MG/1
2 CAPSULE, EXTENDED RELEASE ORAL ONCE
Qty: 0 | Refills: 0 | Status: DISCONTINUED | OUTPATIENT
Start: 2019-04-05 | End: 2019-04-05

## 2019-04-05 RX ORDER — CEFTRIAXONE 500 MG/1
2 INJECTION, POWDER, FOR SOLUTION INTRAMUSCULAR; INTRAVENOUS ONCE
Qty: 0 | Refills: 0 | Status: COMPLETED | OUTPATIENT
Start: 2019-04-05 | End: 2019-04-05

## 2019-04-05 RX ORDER — CEFTRIAXONE 500 MG/1
1 INJECTION, POWDER, FOR SOLUTION INTRAMUSCULAR; INTRAVENOUS ONCE
Qty: 0 | Refills: 0 | Status: DISCONTINUED | OUTPATIENT
Start: 2019-04-05 | End: 2019-04-05

## 2019-04-05 RX ORDER — LORATADINE 10 MG/1
10 TABLET ORAL DAILY
Qty: 0 | Refills: 0 | Status: DISCONTINUED | OUTPATIENT
Start: 2019-04-05 | End: 2019-04-12

## 2019-04-05 RX ORDER — BACLOFEN 100 %
10 POWDER (GRAM) MISCELLANEOUS THREE TIMES A DAY
Qty: 0 | Refills: 0 | Status: DISCONTINUED | OUTPATIENT
Start: 2019-04-05 | End: 2019-04-12

## 2019-04-05 RX ORDER — IOHEXOL 300 MG/ML
30 INJECTION, SOLUTION INTRAVENOUS ONCE
Qty: 0 | Refills: 0 | Status: COMPLETED | OUTPATIENT
Start: 2019-04-05 | End: 2019-04-05

## 2019-04-05 RX ORDER — LACTULOSE 10 G/15ML
30 SOLUTION ORAL
Qty: 0 | Refills: 0 | Status: DISCONTINUED | OUTPATIENT
Start: 2019-04-05 | End: 2019-04-06

## 2019-04-05 RX ORDER — MAGNESIUM HYDROXIDE 400 MG/1
30 TABLET, CHEWABLE ORAL DAILY
Qty: 0 | Refills: 0 | Status: DISCONTINUED | OUTPATIENT
Start: 2019-04-05 | End: 2019-04-12

## 2019-04-05 RX ORDER — CHLORHEXIDINE GLUCONATE 213 G/1000ML
1 SOLUTION TOPICAL
Qty: 0 | Refills: 0 | Status: DISCONTINUED | OUTPATIENT
Start: 2019-04-05 | End: 2019-04-12

## 2019-04-05 RX ORDER — MORPHINE SULFATE 50 MG/1
4 CAPSULE, EXTENDED RELEASE ORAL ONCE
Qty: 0 | Refills: 0 | Status: DISCONTINUED | OUTPATIENT
Start: 2019-04-05 | End: 2019-04-05

## 2019-04-05 RX ORDER — ENOXAPARIN SODIUM 100 MG/ML
40 INJECTION SUBCUTANEOUS EVERY 24 HOURS
Qty: 0 | Refills: 0 | Status: DISCONTINUED | OUTPATIENT
Start: 2019-04-05 | End: 2019-04-12

## 2019-04-05 RX ORDER — PHENOBARBITAL 60 MG
2 TABLET ORAL
Qty: 0 | Refills: 0 | COMMUNITY

## 2019-04-05 RX ORDER — MEROPENEM 1 G/30ML
1000 INJECTION INTRAVENOUS EVERY 8 HOURS
Qty: 0 | Refills: 0 | Status: DISCONTINUED | OUTPATIENT
Start: 2019-04-05 | End: 2019-04-08

## 2019-04-05 RX ADMIN — ENOXAPARIN SODIUM 40 MILLIGRAM(S): 100 INJECTION SUBCUTANEOUS at 21:53

## 2019-04-05 RX ADMIN — SODIUM CHLORIDE 500 MILLILITER(S): 9 INJECTION, SOLUTION INTRAVENOUS at 13:36

## 2019-04-05 RX ADMIN — CEFTRIAXONE 100 GRAM(S): 500 INJECTION, POWDER, FOR SOLUTION INTRAMUSCULAR; INTRAVENOUS at 16:45

## 2019-04-05 RX ADMIN — MORPHINE SULFATE 2 MILLIGRAM(S): 50 CAPSULE, EXTENDED RELEASE ORAL at 13:11

## 2019-04-05 RX ADMIN — Medication 650 MILLIGRAM(S): at 15:45

## 2019-04-05 RX ADMIN — SODIUM CHLORIDE 500 MILLILITER(S): 9 INJECTION, SOLUTION INTRAVENOUS at 15:58

## 2019-04-05 RX ADMIN — Medication 10 MILLIGRAM(S): at 21:54

## 2019-04-05 RX ADMIN — MEROPENEM 100 MILLIGRAM(S): 1 INJECTION INTRAVENOUS at 22:25

## 2019-04-05 RX ADMIN — MORPHINE SULFATE 2 MILLIGRAM(S): 50 CAPSULE, EXTENDED RELEASE ORAL at 12:03

## 2019-04-05 RX ADMIN — MORPHINE SULFATE 2 MILLIGRAM(S): 50 CAPSULE, EXTENDED RELEASE ORAL at 13:46

## 2019-04-05 RX ADMIN — MORPHINE SULFATE 4 MILLIGRAM(S): 50 CAPSULE, EXTENDED RELEASE ORAL at 13:50

## 2019-04-05 RX ADMIN — Medication 650 MILLIGRAM(S): at 19:15

## 2019-04-05 RX ADMIN — Medication 64.8 MILLIGRAM(S): at 21:54

## 2019-04-05 RX ADMIN — LORATADINE 10 MILLIGRAM(S): 10 TABLET ORAL at 21:54

## 2019-04-05 RX ADMIN — MORPHINE SULFATE 2 MILLIGRAM(S): 50 CAPSULE, EXTENDED RELEASE ORAL at 11:48

## 2019-04-05 RX ADMIN — IOHEXOL 30 MILLILITER(S): 300 INJECTION, SOLUTION INTRAVENOUS at 12:00

## 2019-04-05 RX ADMIN — SODIUM CHLORIDE 500 MILLILITER(S): 9 INJECTION, SOLUTION INTRAVENOUS at 11:50

## 2019-04-05 NOTE — ED PROVIDER NOTE - ATTENDING CONTRIBUTION TO CARE
64 y/o M pmh cp sz d/o, spas paraplegia, s/p peg and suprapubic cath, p/w non productive cough x2d. + low grade fever. + nb vomiting x3. last BM today, watery (?). no recent abx, travel.      CONSTITUTIONAL: NAD  SKIN: Warm dry  HEAD: NCAT  EYES: NL inspection  ENT: MMM  NECK: Supple; non tender.  CARD: RRR  RESP: CTAB  ABD: distended, + ttp R abd; no r/g  EXT: no pedal edema  Neuro: paraplegia, at baseline  PSYCH: Cooperative, appropriate 62 y/o M pmh cp sz d/o, spas paraplegia, s/p peg and suprapubic cath, p/w non productive cough x2d. + low grade fever. + nb vomiting x3. last BM today, watery (?). no recent abx, travel.      CONSTITUTIONAL: NAD  SKIN: Warm dry  HEAD: NCAT  EYES: NL inspection  ENT: MMM  NECK: Supple; non tender.  CARD: RRR  RESP: CTAB  ABD: distended, + ttp R abd; no r/g  EXT: no pedal edema  Neuro: paraplegia, at baseline  PSYCH: Cooperative, appropriate    IMP: obstruction vs PNA  P: cxr kub, ivf, analgesia, labs, ua, reassess.

## 2019-04-05 NOTE — H&P ADULT - NSHPPHYSICALEXAM_GEN_ALL_CORE
GEN: NAD, comfortable, contracted  CARDIO: RRR, no m/r/g  RESP: CTAB, no w/r/r  ABD: distended, firm, minimal tenderness on palpation, no guarding or rebound tenderness, PEG + suprapubic cath + R nephrostomy noted  EXT: no edema, UE + LE contracted  NEURO: patient understands and is able to verbalize thoughts, though otherwise limited communication

## 2019-04-05 NOTE — CONSULT NOTE ADULT - SUBJECTIVE AND OBJECTIVE BOX
Gastroenterology/Hepatology Consultation    63y Male with   Patient is a 63y old  Male who presents with a chief complaint of pyelonephritis (05 Apr 2019 17:21)    HPI:  62 yo M with PMH of cerebral palsy from group home, seizure disorder, spastic paraplegia, s/p PEG tube, BPH, bladder neck stricture s/p suprapubic cath, asthma, nephrolithiasis, chronic constipation presented to ED with low grade fevers and NBNB emesis x3. Also admits to pain over suprapubic cath site. Also reports 1 episode of watery BM, no blood noted.    In ED, CT abd/pel showed R hydroureteronephrosis w/ obstructing calculi. (05 Apr 2019 17:21)      GI was called out of concern for acute sigmoid volvulus on Xray.  CT was recommended and revealed and impaction with no volvulus.    Family history unknown  No pertinent family history in first degree relatives      Review of system  General:  (-) weight loss, (-) fevers  Eyes:  (-) visual changes  CV:  (-) chest pain  Resp: (-) SOB, (-) wheezing  GI: (-) abdominal pain,  (-) nausea, (-) vomiting, (-) dysphagia, (-) diarrhea, (-) constipation, (-) rectal bleeding, (-) melena, (-) hematemesis.  Neuro: (-) confusion, (-) weakness  Psych:  (-) Hallucinations  Heme:  (-) easy bruisability    Past medical/surgical Hx:  PAST MEDICAL & SURGICAL HISTORY:  Asthma  Urinary retention  Urinary calculi  Spastic quadriplegia  Osteoporosis  Seizure  Cerebral palsy  BPH (benign prostatic hyperplasia)  Suprapubic catheter  H/O cystoscopy  S/P percutaneous endoscopic gastrostomy (PEG) tube placement    Home Medications:  albuterol 2.5 mg/3 mL (0.083%) inhalation solution: 3 milliliter(s) inhaled every 6 hours, As Needed  Artificial Tears ophthalmic solution: 1 drop(s) to each affected eye 4 times a day  baclofen 10 mg oral tablet: 1 tab(s) by gastrostomy tube 3 times a day  clotrimazole 1% topical cream: 1 application topically, on penis   docusate sodium 60 mg/15 mL oral syrup: 100 milligram(s) by gastrostomy tube once a day, As Needed for constipation  finasteride 5 mg oral tablet: 1 tab(s) by gastrostomy tube once a day  lactulose 10 g/15 mL oral solution: Give 30 ml via GTUBE if no bowel movements for 2 days  Milk of Magnesia 8% oral suspension: 30 milliliter(s) by gastrostomy tube twice a day as needed if no bm in 3 days  oxybutynin 10 mg/24 hr oral tablet, extended release: 1 tab(s) by gastrostomy tube once a day -for bladder spasms   Oysco 500 (1250 mg calcium carbonate) oral tablet: 1 tab(s) by gastrostomy tube 2 times a day  PHENobarbital 32.4 mg oral tablet: 2 tab(s) by gastrostomy tube once a day  povidone iodine 10% topical solution: 1 application topically , on penis  tamsulosin 0.4 mg oral capsule: 1 cap(s) by gastrostomy tube once a day  Ventolin HFA 90 mcg/inh inhalation aerosol: 2 puff(s) inhaled 4 times a day, As Needed      Allergies: No Known Allergies      Current Medications:   chlorhexidine 4% Liquid 1 Application(s) Topical <User Schedule>      Physical exam:  T(C): 36.8 (04-05-19 @ 18:08), Max: 37.8 (04-05-19 @ 15:36)  HR: 99 (04-05-19 @ 18:08) (99 - 114)  BP: 130/76 (04-05-19 @ 18:08) (128/91 - 148/83)  RR: 18 (04-05-19 @ 18:08) (18 - 18)  SpO2: 96% (04-05-19 @ 18:08) (95% - 100%)  GENERAL: NAD  HEAD:  Atraumatic, Normocephalic  EYES: Sclera:NL  NECK: Supple, no JVD or thyromegaly  CHEST/LUNG: Good bilateral air entry  HEART: normal S1, S2. Regular  ABDOMEN: (-) distended, (-) tender, (-) rebound, (+) BS, (-)HSM  EXTREMITIES: (-) edema  NEUROLOGY: (-) asterixis  SKIN: (-) jaundice  PRIYA: (-) melena (-) brbpr    Data:                        13.6   8.20  )-----------( 183      ( 05 Apr 2019 10:34 )             39.6     MCV 90.4 (04-05-19)    RDW 13.2 (04-05-19)    HGB trend:  13.6  04-05-19 @ 10:34      04-05    144  |  104  |  18  ----------------------------<  119<H>  4.0   |  26  |  0.6<L>    Ca    9.5      05 Apr 2019 10:34  Phos  3.1     04-05  Mg     1.9     04-05    TPro  7.4  /  Alb  4.1  /  TBili  0.6  /  DBili  x   /  AST  21  /  ALT  21  /  AlkPhos  95  04-05    Liver panel trend:  TBili 0.6   /   AST 21   /   ALT 21   /   AlkP 95   /   Tptn 7.4   /   Alb 4.1    /   DBili --      04-05  TBili 0.4   /   AST 19   /   ALT 17   /   AlkP 78   /   Tptn 6.0   /   Alb 3.6    /   DBili --      03-27    Lipase, Serum: 18 U/L (04-05-19 @ 10:46)      PT/INR - ( 05 Apr 2019 15:35 )   PT: 12.50 sec;   INR: 1.09 ratio         PTT - ( 05 Apr 2019 15:35 )  PTT:30.0 sec    < from: CT Abdomen and Pelvis w/ Oral Cont and w/ IV Cont (04.05.19 @ 15:17) >    LOWER CHEST: Increasing left lower lobe subsegmental atelectasis,   partially obscured by significant streak artifact originating from   adjacent contrast material layering within the stomach. Stable elevation   of the left hemidiaphragm.    HEPATOBILIARY: Subcentimeterhypodensity of the left hepatic lobe is too   small to further characterize.    SPLEEN: Unremarkable.    PANCREAS: Unremarkable.    ADRENAL GLANDS: Unremarkable.    KIDNEYS: Moderate right hydroureteronephrosis, periureteral fat   stranding, and urothelial enhancement down to the level of an   approximately 6 mm obstructing calculus in the right mid ureter with   associated delayed right nephrogram and right perinephric fat stranding.   Additionally, there are multiple currently nonobstructing calculi   measuring up to 8 mm lying dependently within the right renal pelvis and   ureteropelvic junction. No left-sided hydroureteronephrosis.   Subcentimeter hypodensities in the left kidney are too small to further   characterize.    ABDOMINOPELVICNODES: No lymphadenopathy.    PELVIC ORGANS: Suprapubic catheter pigtails in the bladder. Mild   distention of the urinary bladder with fluid. There is also gas within   the bladder lumen.    PERITONEUM/MESENTERY/BOWEL: Prominent, air filled sigmoid colon,   measuring approximately 5.5 cm in transverse diameter which is slightly   increased from prior CT examination. Moderate fecal load within the   rectum. Mild hyperemia of the rectal mucosa is noted. Trace   abdominopelvic fluid. Stable appropriate positioning of gastrostomy tube.   Normal appendix.    BONES/SOFT TISSUES: Bilateral small fat and fluid-containing inguinal   hernias. Hyperlordotic lumbar curvature. Bilateral hip dysplasia with   superolateral subluxation of the right femur from the acetabulum.      IMPRESSION:    Moderate right hydroureteronephrosis, periureteral fat stranding, and   urothelial enhancement down to the level of a approximately 6 mm   obstructing calculus in the right mid ureter with associated delayed   right nephrogram and right perinephric fat stranding. Additionally, there   are multiple currently nonobstructing calculi measuring up to 8 mm lying   dependently within the right renal pelvis and ureteropelvic junction.    No evidence of sigmoid volvulus.    Persistently prominent, air filled sigmoid colon, measuring approximately   5.5 cm in transverse diameter which is slightly increased from prior CT   examination. Moderate fecal load within the rectum. No evidence of   pathologic obstruction.     < end of copied text > Gastroenterology/Hepatology Consultation    63y Male with   Patient is a 63y old  Male who presents with a chief complaint of pyelonephritis (05 Apr 2019 17:21)    HPI:  64 yo M with PMH of cerebral palsy from group home, seizure disorder, spastic paraplegia, s/p PEG tube, BPH, bladder neck stricture s/p suprapubic cath, asthma, nephrolithiasis, chronic constipation presented to ED with low grade fevers and NBNB emesis x3. Also admits to pain over suprapubic cath site. Also reports 1 episode of watery BM, no blood noted.    In ED, CT abd/pel showed R hydroureteronephrosis w/ obstructing calculi. (05 Apr 2019 17:21)      GI was called out of concern for acute sigmoid volvulus on Xray.  CT was recommended and revealed and impaction with no volvulus.    Family history unknown  No pertinent family history in first degree relatives      Review of system  unobtainable    Past medical/surgical Hx:  PAST MEDICAL & SURGICAL HISTORY:  Asthma  Urinary retention  Urinary calculi  Spastic quadriplegia  Osteoporosis  Seizure  Cerebral palsy  BPH (benign prostatic hyperplasia)  Suprapubic catheter  H/O cystoscopy  S/P percutaneous endoscopic gastrostomy (PEG) tube placement    Home Medications:  albuterol 2.5 mg/3 mL (0.083%) inhalation solution: 3 milliliter(s) inhaled every 6 hours, As Needed  Artificial Tears ophthalmic solution: 1 drop(s) to each affected eye 4 times a day  baclofen 10 mg oral tablet: 1 tab(s) by gastrostomy tube 3 times a day  clotrimazole 1% topical cream: 1 application topically, on penis   docusate sodium 60 mg/15 mL oral syrup: 100 milligram(s) by gastrostomy tube once a day, As Needed for constipation  finasteride 5 mg oral tablet: 1 tab(s) by gastrostomy tube once a day  lactulose 10 g/15 mL oral solution: Give 30 ml via GTUBE if no bowel movements for 2 days  Milk of Magnesia 8% oral suspension: 30 milliliter(s) by gastrostomy tube twice a day as needed if no bm in 3 days  oxybutynin 10 mg/24 hr oral tablet, extended release: 1 tab(s) by gastrostomy tube once a day -for bladder spasms   Oysco 500 (1250 mg calcium carbonate) oral tablet: 1 tab(s) by gastrostomy tube 2 times a day  PHENobarbital 32.4 mg oral tablet: 2 tab(s) by gastrostomy tube once a day  povidone iodine 10% topical solution: 1 application topically , on penis  tamsulosin 0.4 mg oral capsule: 1 cap(s) by gastrostomy tube once a day  Ventolin HFA 90 mcg/inh inhalation aerosol: 2 puff(s) inhaled 4 times a day, As Needed      Allergies: No Known Allergies      Current Medications:   chlorhexidine 4% Liquid 1 Application(s) Topical <User Schedule>      Physical exam:  T(C): 36.8 (04-05-19 @ 18:08), Max: 37.8 (04-05-19 @ 15:36)  HR: 99 (04-05-19 @ 18:08) (99 - 114)  BP: 130/76 (04-05-19 @ 18:08) (128/91 - 148/83)  RR: 18 (04-05-19 @ 18:08) (18 - 18)  SpO2: 96% (04-05-19 @ 18:08) (95% - 100%)  GENERAL: NAD  HEAD:  Atraumatic, Normocephalic  EYES: Sclera:NL  NECK: Supple, no JVD or thyromegaly  CHEST/LUNG: Good bilateral air entry  HEART: normal S1, S2. Regular  ABDOMEN: (-) distended, (-) tender, (-) rebound, (+) BS, (-)HSM  EXTREMITIES: (-) edema  NEUROLOGY: (-) asterixis  SKIN: (-) jaundice  PRIYA: (-) melena (-) brbpr    Data:                        13.6   8.20  )-----------( 183      ( 05 Apr 2019 10:34 )             39.6     MCV 90.4 (04-05-19)    RDW 13.2 (04-05-19)    HGB trend:  13.6  04-05-19 @ 10:34      04-05    144  |  104  |  18  ----------------------------<  119<H>  4.0   |  26  |  0.6<L>    Ca    9.5      05 Apr 2019 10:34  Phos  3.1     04-05  Mg     1.9     04-05    TPro  7.4  /  Alb  4.1  /  TBili  0.6  /  DBili  x   /  AST  21  /  ALT  21  /  AlkPhos  95  04-05    Liver panel trend:  TBili 0.6   /   AST 21   /   ALT 21   /   AlkP 95   /   Tptn 7.4   /   Alb 4.1    /   DBili --      04-05  TBili 0.4   /   AST 19   /   ALT 17   /   AlkP 78   /   Tptn 6.0   /   Alb 3.6    /   DBili --      03-27    Lipase, Serum: 18 U/L (04-05-19 @ 10:46)      PT/INR - ( 05 Apr 2019 15:35 )   PT: 12.50 sec;   INR: 1.09 ratio         PTT - ( 05 Apr 2019 15:35 )  PTT:30.0 sec    < from: CT Abdomen and Pelvis w/ Oral Cont and w/ IV Cont (04.05.19 @ 15:17) >    LOWER CHEST: Increasing left lower lobe subsegmental atelectasis,   partially obscured by significant streak artifact originating from   adjacent contrast material layering within the stomach. Stable elevation   of the left hemidiaphragm.    HEPATOBILIARY: Subcentimeterhypodensity of the left hepatic lobe is too   small to further characterize.    SPLEEN: Unremarkable.    PANCREAS: Unremarkable.    ADRENAL GLANDS: Unremarkable.    KIDNEYS: Moderate right hydroureteronephrosis, periureteral fat   stranding, and urothelial enhancement down to the level of an   approximately 6 mm obstructing calculus in the right mid ureter with   associated delayed right nephrogram and right perinephric fat stranding.   Additionally, there are multiple currently nonobstructing calculi   measuring up to 8 mm lying dependently within the right renal pelvis and   ureteropelvic junction. No left-sided hydroureteronephrosis.   Subcentimeter hypodensities in the left kidney are too small to further   characterize.    ABDOMINOPELVICNODES: No lymphadenopathy.    PELVIC ORGANS: Suprapubic catheter pigtails in the bladder. Mild   distention of the urinary bladder with fluid. There is also gas within   the bladder lumen.    PERITONEUM/MESENTERY/BOWEL: Prominent, air filled sigmoid colon,   measuring approximately 5.5 cm in transverse diameter which is slightly   increased from prior CT examination. Moderate fecal load within the   rectum. Mild hyperemia of the rectal mucosa is noted. Trace   abdominopelvic fluid. Stable appropriate positioning of gastrostomy tube.   Normal appendix.    BONES/SOFT TISSUES: Bilateral small fat and fluid-containing inguinal   hernias. Hyperlordotic lumbar curvature. Bilateral hip dysplasia with   superolateral subluxation of the right femur from the acetabulum.      IMPRESSION:    Moderate right hydroureteronephrosis, periureteral fat stranding, and   urothelial enhancement down to the level of a approximately 6 mm   obstructing calculus in the right mid ureter with associated delayed   right nephrogram and right perinephric fat stranding. Additionally, there   are multiple currently nonobstructing calculi measuring up to 8 mm lying   dependently within the right renal pelvis and ureteropelvic junction.    No evidence of sigmoid volvulus.    Persistently prominent, air filled sigmoid colon, measuring approximately   5.5 cm in transverse diameter which is slightly increased from prior CT   examination. Moderate fecal load within the rectum. No evidence of   pathologic obstruction.     < end of copied text >

## 2019-04-05 NOTE — PROGRESS NOTE ADULT - SUBJECTIVE AND OBJECTIVE BOX
INTERVENTIONAL RADIOLOGY BRIEF-OPERATIVE NOTE    Procedure: Right nephrostomy catheter placement    Pre-Op Diagnosis: Hydronephrosis    Post-Op Diagnosis: Same    Attending: Elliot Landau  Resident: None    Anesthesia (type):  [ ] General Anesthesia  [x] Sedation  [ ] Spinal Anesthesia  [x] Local/Regional    Contrast: 10 cc Visipaque     Estimated Blood Loss: < 5 cc    Condition:   [ ] Critical  [ ] Serious  [x] Fair   [ ] Good    Findings/Follow up Plan of Care: Moderate right hydronephrosis. Placement of 8 Bulgarian nephrostomy without complication. Patient tolerated procedure well.    Specimens Removed: Microbiology    Implants: None    Complications: None    Disposition: Return to ED      Please call Interventional Radiology [phone] with any questions, concerns, or issues.

## 2019-04-05 NOTE — ED PROVIDER NOTE - PHYSICAL EXAMINATION
CONSTITUTIONAL: Well-developed; well-nourished; in no acute distress.   SKIN: warm, dry  HEAD: Normocephalic; atraumatic.  EYES: PERRL, EOMI, no conjunctival erythema  ENT: No nasal discharge; airway clear.  NECK: Supple; non tender.  CARD: S1, S2 normal; no murmurs, gallops, or rubs. Regular rate and rhythm.   RESP: No wheezes, rales or rhonchi.  ABD: distended, no peritoneal signs, TTP over RUQ and RLQ.  EXT: Normal ROM.  No clubbing, cyanosis or edema.   LYMPH: No acute cervical adenopathy.  NEURO: Alert, oriented, grossly unremarkable  PSYCH: Cooperative, appropriate.

## 2019-04-05 NOTE — CONSULT NOTE ADULT - CONSULT REASON
obstructing ureteral stone obstructing ureteral stone, urinary retention, urethral stricture, renal stones

## 2019-04-05 NOTE — H&P ADULT - ATTENDING COMMENTS
Patient was evaluated and examined by bedside, independently, spiked fever, has persistent abdominal distention, on peg feedings  All labs, radiology studies, VS was reviewed  GENERAL: NAD, awake, quadriplegia, patient is laying comfortably in bed  HEENT: AT, NC, PERRLA, SUPPLE, NO JVD, NO CB  LUNG: CTA B/L  CVS: normal S1, S2, RRR, NO M/G/R  ABDOMEN: distended, peg present, bowel sounds present, normoactive in all 4 quadrants, non-tender, SPC present, right nephrostomy tube present  EXT: no E/C/C, positive PP b/l extremities  NEURO: chronic quadriplegia, with spastic contractures of upper extremities  SKIN: no rash, no ecchymosis  I agree with medical plan outlined by Medical resident as stated above.    #) Acute  Pyelonephritis with R hydrourteronephrosis from obstructing calculi  - CT abd/pel: moderate R hydrourteronephrosis, periureteral fat stranding, 6mm obstructing calc, additional other nonobstructive calc  - UA +ve, T=100.1 in ED  - patient has history of Pseudomonas and ESBL Proteus in urine in past- will place on contact isolation.  - IV Meropenem  - f/u blood + urine Cx  - ID eval  - s/p R nephrostomy tube placement by IR    #) Seizure disorder - c/w Phenobarbital    #) Spastic paraplegia - c/w Baclofen, bed confinement status, decub. prevention tx.     #) BPH - c/w Flomax + Finasteride    #) Chronic constipation - c/w Senna + Colace + Lactulose / Milk of Mag PRN  - CT abd/pel: no evidence of sigmoid volvulus, moderate fecal load, no pathological obstruction    #) h/o Peg placement- continue peg feedings as tolerated with residual monitoring    #) Asthma - stable, c/w Ventolin PRN

## 2019-04-05 NOTE — ED PROVIDER NOTE - PROGRESS NOTE DETAILS
Spoke with surgery who agree to see the patient. Requesting preop labs. Bedside sono shows 280 mL of urine retained Pt signed out by Dr. Cooley- hx cerebral palsy, here for cough, low grade temp, abdo distension, R abdo tenderness. pending ctap r/o obstruction- GI and surg consult pending read, possible IR consultation for suprapubic alatorre cath

## 2019-04-05 NOTE — H&P ADULT - ASSESSMENT
64 yo M with PMH of cerebral palsy from group home, seizure disorder, spastic paraplegia, s/p PEG tube, BPH, bladder neck stricture s/p suprapubic cath, asthma, nephrolithiasis, chronic constipation presented to ED with low grade fevers and NBNB emesis x3. Also admits to pain over suprapubic cath site.    #) Pyelonephritis with R hydrourteronephrosis from obstructing calculi  - CT abd/pel: moderate R hydrourteronephrosis, periureteral fat stranding, 6mm obstructing calc, additional other nonobstructive calc  - UA +ve, T=100.1 in ED  - patient has history of Pseudomonas and ESBL Proteus in urine in past  - will cover w/ Ava for now  - f/u blood + urine Cx  - ID eval    #) Seizure disorder - c/w Phenobarbital    #) Spastic paraplegia - c/w Baclofen    #) BPH - c/w Flomax + Finasteride    #) Chronic constipation - c/w Senna + Colace + Lactulose / Milk of Mag PRN    #) Asthma - stable, c/w Ventolin PRN    DVT ppx: Lovenox subQ  Diet: feeds through PEGs  Activity: OOBTC  Code status: FULL  Dispo: group home 62 yo M with PMH of cerebral palsy from group home, seizure disorder, spastic paraplegia, s/p PEG tube, BPH, bladder neck stricture s/p suprapubic cath, asthma, nephrolithiasis, chronic constipation presented to ED with low grade fevers and NBNB emesis x3. Also admits to pain over suprapubic cath site.    #) Pyelonephritis with R hydrourteronephrosis from obstructing calculi  - CT abd/pel: moderate R hydrourteronephrosis, periureteral fat stranding, 6mm obstructing calc, additional other nonobstructive calc  - UA +ve, T=100.1 in ED  - patient has history of Pseudomonas and ESBL Proteus in urine in past  - will cover w/ Ava for now  - f/u blood + urine Cx  - ID eval  - s/p R nephrostomy tube placement by IR    #) Seizure disorder - c/w Phenobarbital    #) Spastic paraplegia - c/w Baclofen    #) BPH - c/w Flomax + Finasteride    #) Chronic constipation - c/w Senna + Colace + Lactulose / Milk of Mag PRN    #) Asthma - stable, c/w Ventolin PRN    DVT ppx: Lovenox subQ  Diet: feeds + meds through PEG  Activity: OOBTC  Code status: FULL  Dispo: group home 64 yo M with PMH of cerebral palsy from group home, seizure disorder, spastic paraplegia, s/p PEG tube, BPH, bladder neck stricture s/p suprapubic cath, asthma, nephrolithiasis, chronic constipation presented to ED with low grade fevers and NBNB emesis x3. Also admits to pain over suprapubic cath site.    #) Pyelonephritis with R hydrourteronephrosis from obstructing calculi  - CT abd/pel: moderate R hydrourteronephrosis, periureteral fat stranding, 6mm obstructing calc, additional other nonobstructive calc  - UA +ve, T=100.1 in ED  - patient has history of Pseudomonas and ESBL Proteus in urine in past  - will cover w/ Ava for now  - f/u blood + urine Cx  - ID eval  - s/p R nephrostomy tube placement by IR    #) Seizure disorder - c/w Phenobarbital    #) Spastic paraplegia - c/w Baclofen    #) BPH - c/w Flomax + Finasteride    #) Chronic constipation - c/w Senna + Colace + Lactulose / Milk of Mag PRN  - CT abd/pel: no evidence of sigmoid volvulus, moderate fecal load, no pathological obstruction    #) Asthma - stable, c/w Ventolin PRN    DVT ppx: Lovenox subQ  Diet: feeds + meds through PEG  Activity: OOBTC  Code status: FULL  Dispo: group home

## 2019-04-05 NOTE — ED PROVIDER NOTE - CLINICAL SUMMARY MEDICAL DECISION MAKING FREE TEXT BOX
64 yo m hx cerebral palsy, sz  here for cough, low grade temp, abdo distension, vomiting, R abdo tenderness. w.u revealing pyelo w/ obstructing stone. seen by urology. will admit for iv abx, IR eval for nephrostomy tube

## 2019-04-05 NOTE — ED PROVIDER NOTE - OBJECTIVE STATEMENT
63M with pmh cerebral palsy from group home, seizure disorder, Spastic paraplegia, PEG tube placement, BPH, bladder neck stricture s/p SPT, asthma, nephrolithiasis presents with low grade fever and vomiting 3x since yesterday night. Last BM this AM, watery, without blood, states that he does not know color or consistency of vomitus. Admits to pain over suprapubic catheter site. Denies hx of appendectomy or cholecystectomy.    Urologist: Ar

## 2019-04-05 NOTE — ED ADULT NURSE NOTE - NSIMPLEMENTINTERV_GEN_ALL_ED
Implemented All Fall with Harm Risk Interventions:  [place] to call system. Call bell, personal items and telephone within reach. Instruct patient to call for assistance. Room bathroom lighting operational. Non-slip footwear when patient is off stretcher. Physically safe environment: no spills, clutter or unnecessary equipment. Stretcher in lowest position, wheels locked, appropriate side rails in place. Provide visual cue, wrist band, yellow gown, etc. Monitor gait and stability. Monitor for mental status changes and reorient to person, place, and time. Review medications for side effects contributing to fall risk. Reinforce activity limits and safety measures with patient and family. Provide visual clues: red socks.

## 2019-04-05 NOTE — CONSULT NOTE ADULT - ASSESSMENT
63y old Male with fever 2/2 6mm R ureteral calculus causing moderate R hydro.    PLAN:  1.	IV abx  2.	F/u urine cx  3.	IR to place R PCNU

## 2019-04-05 NOTE — ED PROVIDER NOTE - NS ED ROS FT
Constitutional: (-) fever (-) vomiting  Eyes/ENT: (-) eye discharge, (-) runny nose  Cardiovascular: (-) chest pain, (-) syncope  Respiratory: (-) cough, (-) shortness of breath  Gastrointestinal: (-) vomiting, (-) diarrhea, (-) abdominal pain  : (+) dysuria   Musculoskeletal: (-) neck pain, (-) back pain, (-) joint pain  Integumentary: (-) rash, (-) edema  Neurological: (-) headache, (-)loc  Allergic/Immunologic: (-) pruritus  Endocrine: No history of thyroid disease or diabetes.

## 2019-04-05 NOTE — H&P ADULT - NSHPLABSRESULTS_GEN_ALL_CORE
13.6   8.20  )-----------( 183      ( 2019 10:34 )             39.6         144  |  104  |  18  ----------------------------<  119<H>  4.0   |  26  |  0.6<L>    Ca    9.5      2019 10:34  Phos  3.1     -  Mg     1.9         TPro  7.4  /  Alb  4.1  /  TBili  0.6  /  DBili  x   /  AST  21  /  ALT  21  /  AlkPhos  95          Urinalysis Basic - ( 2019 10:34 )    Color: Yellow / Appearance: Turbid / S.015 / pH: x  Gluc: x / Ketone: Negative  / Bili: Negative / Urobili: 0.2 mg/dL   Blood: x / Protein: >=300 mg/dL / Nitrite: Negative   Leuk Esterase: Moderate / RBC: 1-2 /HPF / WBC 6-10 /HPF   Sq Epi: x / Non Sq Epi: Occasional /HPF / Bacteria: Few /HPF    PT/INR - ( 2019 15:35 )   PT: 12.50 sec;   INR: 1.09 ratio       PTT - ( 2019 15:35 )  PTT:30.0 sec    Lactate Trend   @ 10:34 Lactate:1.3 13.6   8.20  )-----------( 183      ( 2019 10:34 )              39.6         144  |  104  |  18  ----------------------------<  119<H>  4.0   |  26  |  0.6<L>    Ca    9.5      2019 10:34  Phos  3.1     -  Mg     1.9         TPro  7.4  /  Alb  4.1  /  TBili  0.6  /  DBili  x   /  AST  21  /  ALT  21  /  AlkPhos  95          Urinalysis Basic - ( 2019 10:34 )    Color: Yellow / Appearance: Turbid / S.015 / pH: x  Gluc: x / Ketone: Negative  / Bili: Negative / Urobili: 0.2 mg/dL   Blood: x / Protein: >=300 mg/dL / Nitrite: Negative   Leuk Esterase: Moderate / RBC: 1-2 /HPF / WBC 6-10 /HPF   Sq Epi: x / Non Sq Epi: Occasional /HPF / Bacteria: Few /HPF    PT/INR - ( 2019 15:35 )   PT: 12.50 sec;   INR: 1.09 ratio       PTT - ( 2019 15:35 )  PTT:30.0 sec    Lactate Trend   @ 10:34 Lactate:1.3

## 2019-04-05 NOTE — H&P ADULT - HISTORY OF PRESENT ILLNESS
62 yo M with PMH of cerebral palsy from group home, seizure disorder, spastic paraplegia, s/p PEG tube, BPH, bladder neck stricture s/p suprapubic cath, asthma, nephrolithiasis, chronic constipation presented to ED with low grade fevers and NBNB emesis x3. Also admits to pain over suprapubic cath site. Also reports 1 episode of watery BM, no blood noted.    In ED, CT abd/pel showed R hydroureteronephrosis w/ obstructing calculi. 64 yo M with PMH of cerebral palsy from group home, seizure disorder, spastic paraplegia, s/p PEG tube, BPH, bladder neck stricture s/p suprapubic cath, asthma, nephrolithiasis, chronic constipation presented to ED with low grade fevers and NBNB emesis x3. Also reports some abdominal pain. Collateral history obtained from aide at bedside (spends most of time with him at ). Patient is chronically constipated but does manage to have regular BM's with aide of laxatives. Denies chest pain or SOB.    In ED, CT abd/pel showed R hydroureteronephrosis w/ obstructing calculi.

## 2019-04-05 NOTE — PROGRESS NOTE ADULT - SUBJECTIVE AND OBJECTIVE BOX
IR was called for possible suprapubic catheter malfunction. Patient has had suprapubic catheter placed at outside facility, with catheter exchange perform several weeks back here at [facility]. Currently has a 14 [place] pigtail catheter.    CT scan reviewed. Catheter an appropriate position. Patient seen at bedside. Catheter successfully flushed with 20 cc normal saline and appropriate return. Catheter working well and no need for exchange.    Of note, CT reviewed and patient has a right ureteral calculus. Please follow up with Urology for stent placement.

## 2019-04-05 NOTE — CONSULT NOTE ADULT - ASSESSMENT
62 yo bedbound M Hxofcerbral palsy admitted for pyelonephritis/nephrolithiasis SP IR perc nephrostomy.  Patint has an impaction.  No volvulus.    1)Impaction  2)Constipation  3)Pyelonephritis    Rec:  - Disimpaction  - Water enemas Q8 x3 doses  - Start Miralax after disimpaction and enemas 17gm BID  - Recall GI 64 yo bedbound M Hxofcerbral palsy admitted for pyelonephritis/nephrolithiasis SP IR perc nephrostomy.  Patint has an impaction.  No volvulus.    1)Impaction  2)Constipation  3)Pyelonephritis    Rec:  - Disimpaction  - Water enemas Q8 x3 doses  - Start Miralax 17gm BID after disimpaction and enemas

## 2019-04-05 NOTE — CONSULT NOTE ADULT - SUBJECTIVE AND OBJECTIVE BOX
Patient is a 63y old Male    PMHx:   PSHx:   PAST MEDICAL & SURGICAL HISTORY:  Asthma  Urinary retention  Urinary calculi  Spastic quadriplegia  Osteoporosis  Seizure  Cerebral palsy  BPH (benign prostatic hyperplasia)  H/O cystoscopy  S/P percutaneous endoscopic gastrostomy (PEG) tube placement    MEDS: acetaminophen    Suspension .. 650 milliGRAM(s)  acetaminophen  Suppository .. 650 milliGRAM(s)  cefTRIAXone   IVPB 1 Gram(s)  cefTRIAXone   IVPB 1 Gram(s)   MEDICATIONS  (STANDING):  acetaminophen    Suspension .. 650 milliGRAM(s) Oral once  acetaminophen  Suppository .. 650 milliGRAM(s) Rectal Once  cefTRIAXone   IVPB 1 Gram(s) IV Intermittent Once  cefTRIAXone   IVPB 1 Gram(s) IV Intermittent Once    MEDICATIONS  (PRN):    ALLERGIES: No Known Allergies      VS: T(F): 100.1, Max: 100.1 ( @ 15:36)  HR: 110 (110 - 114)  BP: 128/91 (128/91 - 148/83)  RR: 18  SpO2: 100% (95% - 100%)  GEN: Alert, awake, NAD    LABS/IMAGIN.6   8.20  )-----------( 183      ( 2019 10:34 )             39.6     -    144  |  104  |  18  ----------------------------<  119<H>  4.0   |  26  |  0.6<L>    Ca    9.5      2019 10:34    TPro  7.4  /  Alb  4.1  /  TBili  0.6  /  DBili  x   /  AST  21  /  ALT  21  /  AlkPhos  95  -05    PT/INR - ( 2019 15:35 )   PT: 12.50 sec;   INR: 1.09 ratio  PTT - ( 2019 15:35 )  PTT:30.0 sec    Urinalysis Basic - ( 2019 10:3  Color: Yellow / Appearance: Turbid / S.015 / pH: x  Gluc: x / Ketone: Negative  / Bili: Negative / Urobili: 0.2 mg/dL   Blood: x / Protein: >=300 mg/dL / Nitrite: Negative   Leuk Esterase: Moderate / RBC: 1-2 /HPF / WBC 6-10 /HPF   Sq Epi: x / Non Sq Epi: Occasional /HPF / Bacteria: Few /HPF    < from: CT Abdomen and Pelvis w/ Oral Cont and w/ IV Cont (19 @ 15:17) >  FINDINGS:  LOWER CHEST: Increasing left lower lobe subsegmental atelectasis,   partially obscured by significant streak artifact originating from   adjacent contrast material layering within the stomach. Stable elevation   of the left hemidiaphragm.  HEPATOBILIARY: Subcentimeterhypodensity of the left hepatic lobe is too   small to further characterize.  SPLEEN: Unremarkable.  PANCREAS: Unremarkable.  ADRENAL GLANDS: Unremarkable.  KIDNEYS: Moderate right hydroureteronephrosis, periureteral fat   stranding, and urothelial enhancement down to the level of an   approximately 6 mm obstructing calculus in the right mid ureter with   associated delayed right nephrogram and right perinephric fat stranding.   Additionally, there are multiple currently nonobstructing calculi   measuring up to 8 mm lying dependently within the right renal pelvis and   ureteropelvic junction. No left-sided hydroureteronephrosis.   Subcentimeter hypodensities in the left kidney are too small to further   characterize.  ABDOMINOPELVICNODES: No lymphadenopathy.  PELVIC ORGANS: Suprapubic catheter pigtails in the bladder. Mild   distention of the urinary bladder with fluid. There is also gas within   the bladder lumen.  PERITONEUM/MESENTERY/BOWEL: Prominent, air filled sigmoid colon,   measuring approximately 5.5 cm in transverse diameter which is slightly   increased from prior CT examination. Moderate fecal load within the   rectum. Mild hyperemia of the rectal mucosa is noted. Trace   abdominopelvic fluid. Stable appropriate positioning of gastrostomy tube.   Normal appendix.  BONES/SOFT TISSUES: Bilateral small fat and fluid-containing inguinal   hernias. Hyperlordotic lumbar curvature. Bilateral hip dysplasia with   superolateral subluxation of the right femur from the acetabulum.  IMPRESSION:  Moderate right hydroureteronephrosis, periureteral fat stranding, and   urothelial enhancement down to the level of a approximately 6 mm   obstructing calculus in the right mid ureter with associated delayed   right nephrogram and right perinephric fat stranding. Additionally, there   are multiple currently nonobstructing calculi measuring up to 8 mm lying   dependently within the right renal pelvis and ureteropelvic junction.  No evidence of sigmoid volvulus.  Persistently prominent, air filled sigmoid colon, measuring approximately   5.5 cm in transverse diameter which is slightly increased from prior CT   examination. Moderate fecal load within the rectum. No evidence of   pathologic obstruction.   < end of copied text > 63y old Male brought in from facility for vomiting, low grade fever, abd pain x 1 day with associated loose stool x 1. History provided by facility aide and sister at bedside.    PAST MEDICAL & SURGICAL HISTORY:  Asthma  Urinary retention  Urinary calculi  Spastic quadriplegia  Osteoporosis  Seizure  Cerebral palsy  BPH (benign prostatic hyperplasia)  H/O cystoscopy  S/P percutaneous endoscopic gastrostomy (PEG) tube placement    MEDS: acetaminophen    Suspension .. 650 milliGRAM(s)  acetaminophen  Suppository .. 650 milliGRAM(s)  cefTRIAXone   IVPB 1 Gram(s)  cefTRIAXone   IVPB 1 Gram(s)   MEDICATIONS  (STANDING):  acetaminophen    Suspension .. 650 milliGRAM(s) Oral once  acetaminophen  Suppository .. 650 milliGRAM(s) Rectal Once  cefTRIAXone   IVPB 1 Gram(s) IV Intermittent Once  cefTRIAXone   IVPB 1 Gram(s) IV Intermittent Once    MEDICATIONS  (PRN):    ALLERGIES: No Known Allergies    VS: T(F): 100.1, Max: 100.1 ( @ 15:36)  HR: 110 (110 - 114)  BP: 128/91 (128/91 - 148/83)  RR: 18  SpO2: 100% (95% - 100%)  GEN: Alert, awake, NAD. Non toxic.  ABD/: soft, pigtail SPT in place draining clear yellow urine, no rebound/guarding    LABS/IMAGIN.6   8.20  )-----------( 183      ( 2019 10:34 )             39.6     -    144  |  104  |  18  ----------------------------<  119<H>  4.0   |  26  |  0.6<L>    Ca    9.5      2019 10:34    TPro  7.4  /  Alb  4.1  /  TBili  0.6  /  DBili  x   /  AST  21  /  ALT  21  /  AlkPhos  95  04-05    PT/INR - ( 2019 15:35 )   PT: 12.50 sec;   INR: 1.09 ratio  PTT - ( 2019 15:35 )  PTT:30.0 sec    Urinalysis Basic - ( 2019 10:3  Color: Yellow / Appearance: Turbid / S.015 / pH: x  Gluc: x / Ketone: Negative  / Bili: Negative / Urobili: 0.2 mg/dL   Blood: x / Protein: >=300 mg/dL / Nitrite: Negative   Leuk Esterase: Moderate / RBC: 1-2 /HPF / WBC 6-10 /HPF   Sq Epi: x / Non Sq Epi: Occasional /HPF / Bacteria: Few /HPF    < from: CT Abdomen and Pelvis w/ Oral Cont and w/ IV Cont (19 @ 15:17) >  FINDINGS:  LOWER CHEST: Increasing left lower lobe subsegmental atelectasis,   partially obscured by significant streak artifact originating from   adjacent contrast material layering within the stomach. Stable elevation   of the left hemidiaphragm.  HEPATOBILIARY: Subcentimeterhypodensity of the left hepatic lobe is too   small to further characterize.  SPLEEN: Unremarkable.  PANCREAS: Unremarkable.  ADRENAL GLANDS: Unremarkable.  KIDNEYS: Moderate right hydroureteronephrosis, periureteral fat   stranding, and urothelial enhancement down to the level of an   approximately 6 mm obstructing calculus in the right mid ureter with   associated delayed right nephrogram and right perinephric fat stranding.   Additionally, there are multiple currently nonobstructing calculi   measuring up to 8 mm lying dependently within the right renal pelvis and   ureteropelvic junction. No left-sided hydroureteronephrosis.   Subcentimeter hypodensities in the left kidney are too small to further   characterize.  ABDOMINOPELVICNODES: No lymphadenopathy.  PELVIC ORGANS: Suprapubic catheter pigtails in the bladder. Mild   distention of the urinary bladder with fluid. There is also gas within   the bladder lumen.  PERITONEUM/MESENTERY/BOWEL: Prominent, air filled sigmoid colon,   measuring approximately 5.5 cm in transverse diameter which is slightly   increased from prior CT examination. Moderate fecal load within the   rectum. Mild hyperemia of the rectal mucosa is noted. Trace   abdominopelvic fluid. Stable appropriate positioning of gastrostomy tube.   Normal appendix.  BONES/SOFT TISSUES: Bilateral small fat and fluid-containing inguinal   hernias. Hyperlordotic lumbar curvature. Bilateral hip dysplasia with   superolateral subluxation of the right femur from the acetabulum.  IMPRESSION:  Moderate right hydroureteronephrosis, periureteral fat stranding, and   urothelial enhancement down to the level of a approximately 6 mm   obstructing calculus in the right mid ureter with associated delayed   right nephrogram and right perinephric fat stranding. Additionally, there   are multiple currently nonobstructing calculi measuring up to 8 mm lying   dependently within the right renal pelvis and ureteropelvic junction.  No evidence of sigmoid volvulus.  Persistently prominent, air filled sigmoid colon, measuring approximately   5.5 cm in transverse diameter which is slightly increased from prior CT   examination. Moderate fecal load within the rectum. No evidence of   pathologic obstruction.   < end of copied text >

## 2019-04-05 NOTE — PROGRESS NOTE ADULT - PROVIDER SPECIALTY LIST ADULT
[facility] Patient: Bailey Amin    Procedure(s):  Bilateral Strabimus Repair  - Wound Class: I-Clean    Diagnosis: Strabismus, Alternating Exotropia   Diagnosis Additional Information: No value filed.    Anesthesia Type:   General, LMA     Note:  Airway :Face Mask and Oral Airway  Patient transferred to:PACU  Comments: Patient remains sedated, stirring with RN cares; oral airway removed upon arrival to PACU. Exchnanging O2 per FM in no distress. VSS, normothermic. IV patent. Report to RN.Handoff Report: Identifed the Patient, Identified the Reponsible Provider, Reviewed the pertinent medical history, Discussed the surgical course, Reviewed Intra-OP anesthesia mangement and issues during anesthesia, Set expectations for post-procedure period and Allowed opportunity for questions and acknowledgement of understanding      Vitals: (Last set prior to Anesthesia Care Transfer)    CRNA VITALS  4/25/2018 0841 - 4/25/2018 0920      4/25/2018             NIBP: 115/62    Pulse: 123    Temp: 37.1  C (98.8  F)    SpO2: 100 %    Resp Rate (observed): 14    EKG: Sinus tachycardia                Electronically Signed By: DASHA Ny CRNA  April 25, 2018  9:20 AM

## 2019-04-05 NOTE — CONSULT NOTE ADULT - SUBJECTIVE AND OBJECTIVE BOX
Consultation Note  =====================================================    HPI:   63y Male with PMH of cerebral palsy, seizure disorder, Spastic paraplegia, PEG tube placement 2018 (GI - Dr. Bryan), BPH, bladder neck stricture s/p SPT by IR 3/19, asthma, nephrolithiasis presents with low grade fever and vomiting 3x since yesterday night, associated abdominal pain, mostly karlie-umbilical and R mid-abdomen. pain is aching and intermittent. Last BM this AM, was normal for him, usual bowel habit is 2-3 times per day on bowel regimen, passing flatus. Last EGD 2018 at time of PEG, and last C-scope was  where it was noted that he has a large, redundant colon.     PAST MEDICAL & SURGICAL HISTORY:  Asthma  Urinary retention  Urinary calculi  Spastic quadriplegia  Osteoporosis  Seizure  Cerebral palsy  BPH (benign prostatic hyperplasia)  H/O cystoscopy  S/P percutaneous endoscopic gastrostomy (PEG) tube placement    Home Meds: Home Medications:  albuterol 2.5 mg/3 mL (0.083%) inhalation solution: 3 milliliter(s) inhaled every 6 hours, As Needed (26 Mar 2019 04:05)  Artificial Tears ophthalmic solution: 1 drop(s) to each affected eye 4 times a day (26 Mar 2019 04:09)  baclofen 10 mg oral tablet: 1 tab(s) by gastrostomy tube 3 times a day (26 Mar 2019 03:57)  clotrimazole 1% topical cream: 1 application topically, on penis  (27 Mar 2019 11:16)  docusate sodium 60 mg/15 mL oral syrup: 100 milligram(s) by gastrostomy tube once a day, As Needed for constipation (26 Mar 2019 04:01)  finasteride 5 mg oral tablet: 1 tab(s) by gastrostomy tube once a day (26 Mar 2019 03:52)  lactulose 10 g/15 mL oral solution: Give 30 ml via GTUBE if no bowel movements for 2 days (26 Mar 2019 03:55)  Milk of Magnesia 8% oral suspension: 30 milliliter(s) by gastrostomy tube twice a day as needed if no bm in 3 days (26 Mar 2019 04:07)  oxybutynin 10 mg/24 hr oral tablet, extended release: 1 tab(s) by gastrostomy tube once a day -for bladder spasms  (26 Mar 2019 03:59)  Oysco 500 (1250 mg calcium carbonate) oral tablet: 1 tab(s) by gastrostomy tube 2 times a day (26 Mar 2019 04:03)  PHENobarbital 32.4 mg oral tablet: 2 tab(s) by gastrostomy tube once a day (26 Mar 2019 03:53)  povidone iodine 10% topical solution: 1 application topically , on penis (27 Mar 2019 11:16)  tamsulosin 0.4 mg oral capsule: 1 cap(s) by gastrostomy tube once a day (26 Mar 2019 03:53)  Ventolin HFA 90 mcg/inh inhalation aerosol: 2 puff(s) inhaled 4 times a day, As Needed (26 Mar 2019 04:09)    Allergies: Allergies  No Known Allergies  Intolerances    Soc:   Denies Tobacco/EtOH/Substance use  Advanced Directives: Presumed Full Code     ROS:    REVIEW OF SYSTEMS  [ x ] A ten-point review of systems was otherwise negative except as noted.  [ ] Due to altered mental status/intubation, subjective information were not able to be obtained from the patient. History was obtained, to the extent possible, from review of the chart and collateral sources of information.  --------------------------------------------------------------------------------------  VITAL SIGNS, INS/OUTS (last 24 hours):  --------------------------------------------------------------------------------------  ICU Vital Signs Last 24 Hrs  T(C): 37.8 (2019 15:36), Max: 37.8 (2019 15:36)  T(F): 100.1 (2019 15:36), Max: 100.1 (2019 15:36)  HR: 110 (2019 15:36) (110 - 114)  BP: 128/91 (2019 15:36) (128/91 - 148/83)  RR: 18 (2019 15:) (18 - 18)  SpO2: 100% (2019 15:) (95% - 100%)  --------------------------------------------------------------------------------------  PHYSICAL EXAM  General: NAD, AAOx3, calm and cooperative  HEENT: NCAT, poor dentition  Cardiac: RRR S1, S2,  Respiratory: CTAB, normal respiratory effort  Abdomen: Soft, distended, + mid abdominal and R sided tenderness, +bowel sounds, no rebound or guarding  PEG site clean, no drainage or erythema  Skin: Warm/dry, normal color  Suprapubic catheter with good yellow urine no gross hematuria    LABS  --------------------------------------------------------------------------------------  CAPILLARY BLOOD GLUCOSE                    13.6   8.20  )-----------( 183      ( 2019 10:34 )             39.6      Auto Neutrophil %: 76.4 % (19 @ 10:34)  Auto Immature Granulocyte %: 0.2 % (19 @ 10:34)      144  |  104  |  18  ----------------------------<  119<H>  4.0   |  26  |  0.6<L>    Calcium, Total Serum: 9.5 mg/dL (19 @ 10:34)    LFTs:      x    | x    | x        ------------------[x       ( 2019 10:46 )  x    | x    | x           Lipase:18     Amylase:x         Lactate, Blood: 1.3 mmol/L (19 @ 10:34)    Coags: x    Urinalysis Basic - ( 2019 10:34 )  Color: Yellow / Appearance: Turbid / S.015 / pH: x  Gluc: x / Ketone: Negative  / Bili: Negative / Urobili: 0.2 mg/dL   Blood: x / Protein: >=300 mg/dL / Nitrite: Negative   Leuk Esterase: Moderate / RBC: 1-2 /HPF / WBC 6-10 /HPF   Sq Epi: x / Non Sq Epi: Occasional /HPF / Bacteria: Few /HPF  --------------------------------------------------------------------------------------  IMAGING RESULTS  < from: CT Abdomen and Pelvis w/ Oral Cont and w/ IV Cont (19 @ 15:17) >  IMPRESSION:  Moderate right hydroureteronephrosis, periureteral fat stranding, and   urothelial enhancement down to the level of a approximately 6 mm   obstructing calculus in the right mid ureter with associated delayed   right nephrogram and right perinephric fat stranding. Additionally, there   are multiple currently non-obstructing calculi measuring up to 8 mm lying   dependently within the right renal pelvis and ureteropelvic junction.  No evidence of sigmoid volvulus.  Persistently prominent, air filled sigmoid colon, measuring approximately   5.5 cm in transverse diameter which is slightly increased from prior CT   examination. Moderate fecal load within the rectum. No evidence of   pathologic obstruction.   < end of copied text >      < from: Xray Kidney Ureter Bladder (19 @ 11:38) >  IMPRESSION:  Gaseous distention of a bowel loop within the mid abdomen, appearing   similar compared with prior imaging. Moderate amount of colonic stool.  < end of copied text >  ---------------------------------------------------------------------------------------

## 2019-04-05 NOTE — CONSULT NOTE ADULT - ASSESSMENT
ASSESSMENT:  63y Male patient with CP spastic quadriplegia wheelchair bound presents from group home, R sided abdominal pain with associated nausea and vomiting, KUB with stable large bowel distension, CT with moderate fecal load in rectum without obstruction. Findings on CT scan significant for obstructing R nephrolithiasis and hydronephrosis.     PLAN:   Patient would benefit from urology evaluation  Pain control, IV morphine  Monitor UOP  NPO, hold feeds  IVF, resuscitate  Pre-op labs  Continue on bowel regimen, having multiple daily BM as is normal for him  Tap H2O enemas if NPO  Correct electrolytes    Above plan discussed with Dr. Winston , patient, and ED team    --------------------------------------------------------------------------------------    04-05-19 @ 16:15 ASSESSMENT:  63y Male patient with CP spastic quadriplegia wheelchair bound presents from group home, R sided abdominal pain with associated nausea and vomiting, KUB with stable large bowel distension, CT with moderate fecal load in rectum without obstruction. Findings on CT scan significant for obstructing R nephrolithiasis and hydronephrosis.     PLAN:   Patient would benefit from urology evaluation  Pain control, IV morphine  Monitor UOP  IVF, resuscitate  Pre-op labs  Continue on bowel regimen, having multiple daily BM as is normal for him  Tap H2O enemas if NPO  Correct electrolytes    Above plan discussed with Dr. Winston , patient, and ED team    --------------------------------------------------------------------------------------    04-05-19 @ 16:15

## 2019-04-05 NOTE — CONSULT NOTE ADULT - ATTENDING COMMENTS
Patient seen and examined with surgery team on rounds and discussed management plans. elderly male with significant cerebral palsy with no ambulation only sits in chair with 2 person support and lift  in wheel chair.  Abd soft distended and tympanic with gastrostomy and urinary cath in place. supportive care. ? colonic decompression if no improvement
Pt seen and examined  CT images reviewed by me  Known to  for urethral strictures requiring SPT -- is awaiting evaluation by reconstructive urologist at [facility] for urethroplasty  develops obstruction of SPT frequently -- awaiting SPT upsize to 26F  will ask for possible monday procedure  now with right ureteral stone as well as large renal stones -- s/p PCN by IR  -- will need definitive therapy of stones once discharged -- will refer to Dr brown for complex stone treatment  ?antegrade ureteroscopy with laser lithotripsy?

## 2019-04-05 NOTE — ED ADULT NURSE NOTE - OBJECTIVE STATEMENT
pt presents with fever and vomiting that started since last night. denies pain. suprapubic and PEG POA.

## 2019-04-05 NOTE — H&P ADULT - NSICDXPASTSURGICALHX_GEN_ALL_CORE_FT
PAST SURGICAL HISTORY:  H/O cystoscopy     S/P percutaneous endoscopic gastrostomy (PEG) tube placement     Suprapubic catheter

## 2019-04-06 LAB
ALBUMIN SERPL ELPH-MCNC: 3.3 G/DL — LOW (ref 3.5–5.2)
ALP SERPL-CCNC: 74 U/L — SIGNIFICANT CHANGE UP (ref 30–115)
ALT FLD-CCNC: 17 U/L — SIGNIFICANT CHANGE UP (ref 0–41)
ANION GAP SERPL CALC-SCNC: 14 MMOL/L — SIGNIFICANT CHANGE UP (ref 7–14)
AST SERPL-CCNC: 20 U/L — SIGNIFICANT CHANGE UP (ref 0–41)
BASOPHILS # BLD AUTO: 0.03 K/UL — SIGNIFICANT CHANGE UP (ref 0–0.2)
BASOPHILS NFR BLD AUTO: 0.3 % — SIGNIFICANT CHANGE UP (ref 0–1)
BILIRUB SERPL-MCNC: 0.3 MG/DL — SIGNIFICANT CHANGE UP (ref 0.2–1.2)
BUN SERPL-MCNC: 19 MG/DL — SIGNIFICANT CHANGE UP (ref 10–20)
CALCIUM SERPL-MCNC: 8.8 MG/DL — SIGNIFICANT CHANGE UP (ref 8.5–10.1)
CHLORIDE SERPL-SCNC: 105 MMOL/L — SIGNIFICANT CHANGE UP (ref 98–110)
CO2 SERPL-SCNC: 23 MMOL/L — SIGNIFICANT CHANGE UP (ref 17–32)
CREAT SERPL-MCNC: 0.7 MG/DL — SIGNIFICANT CHANGE UP (ref 0.7–1.5)
EOSINOPHIL # BLD AUTO: 0.02 K/UL — SIGNIFICANT CHANGE UP (ref 0–0.7)
EOSINOPHIL NFR BLD AUTO: 0.2 % — SIGNIFICANT CHANGE UP (ref 0–8)
GLUCOSE SERPL-MCNC: 121 MG/DL — HIGH (ref 70–99)
HCT VFR BLD CALC: 35.7 % — LOW (ref 42–52)
HGB BLD-MCNC: 11.9 G/DL — LOW (ref 14–18)
IMM GRANULOCYTES NFR BLD AUTO: 0.3 % — SIGNIFICANT CHANGE UP (ref 0.1–0.3)
LYMPHOCYTES # BLD AUTO: 0.38 K/UL — LOW (ref 1.2–3.4)
LYMPHOCYTES # BLD AUTO: 3.2 % — LOW (ref 20.5–51.1)
MAGNESIUM SERPL-MCNC: 2.1 MG/DL — SIGNIFICANT CHANGE UP (ref 1.8–2.4)
MCHC RBC-ENTMCNC: 31.7 PG — HIGH (ref 27–31)
MCHC RBC-ENTMCNC: 33.3 G/DL — SIGNIFICANT CHANGE UP (ref 32–37)
MCV RBC AUTO: 95.2 FL — HIGH (ref 80–94)
MONOCYTES # BLD AUTO: 1.01 K/UL — HIGH (ref 0.1–0.6)
MONOCYTES NFR BLD AUTO: 8.4 % — SIGNIFICANT CHANGE UP (ref 1.7–9.3)
NEUTROPHILS # BLD AUTO: 10.48 K/UL — HIGH (ref 1.4–6.5)
NEUTROPHILS NFR BLD AUTO: 87.6 % — HIGH (ref 42.2–75.2)
NRBC # BLD: 0 /100 WBCS — SIGNIFICANT CHANGE UP (ref 0–0)
PHOSPHATE SERPL-MCNC: 2.9 MG/DL — SIGNIFICANT CHANGE UP (ref 2.1–4.9)
PLATELET # BLD AUTO: 155 K/UL — SIGNIFICANT CHANGE UP (ref 130–400)
POTASSIUM SERPL-MCNC: 4.1 MMOL/L — SIGNIFICANT CHANGE UP (ref 3.5–5)
POTASSIUM SERPL-SCNC: 4.1 MMOL/L — SIGNIFICANT CHANGE UP (ref 3.5–5)
PROT SERPL-MCNC: 5.9 G/DL — LOW (ref 6–8)
RBC # BLD: 3.75 M/UL — LOW (ref 4.7–6.1)
RBC # FLD: 13.6 % — SIGNIFICANT CHANGE UP (ref 11.5–14.5)
SODIUM SERPL-SCNC: 142 MMOL/L — SIGNIFICANT CHANGE UP (ref 135–146)
WBC # BLD: 11.96 K/UL — HIGH (ref 4.8–10.8)
WBC # FLD AUTO: 11.96 K/UL — HIGH (ref 4.8–10.8)

## 2019-04-06 PROCEDURE — 99222 1ST HOSP IP/OBS MODERATE 55: CPT

## 2019-04-06 RX ORDER — LACTULOSE 10 G/15ML
30 SOLUTION ORAL
Qty: 0 | Refills: 0 | Status: DISCONTINUED | OUTPATIENT
Start: 2019-04-06 | End: 2019-04-12

## 2019-04-06 RX ADMIN — CHLORHEXIDINE GLUCONATE 1 APPLICATION(S): 213 SOLUTION TOPICAL at 05:03

## 2019-04-06 RX ADMIN — MEROPENEM 100 MILLIGRAM(S): 1 INJECTION INTRAVENOUS at 21:46

## 2019-04-06 RX ADMIN — Medication 1 TABLET(S): at 05:02

## 2019-04-06 RX ADMIN — Medication 1 TABLET(S): at 18:47

## 2019-04-06 RX ADMIN — Medication 1 DROP(S): at 18:47

## 2019-04-06 RX ADMIN — Medication 10 MILLIGRAM(S): at 14:09

## 2019-04-06 RX ADMIN — Medication 10 MILLIGRAM(S): at 05:02

## 2019-04-06 RX ADMIN — LACTULOSE 30 GRAM(S): 10 SOLUTION ORAL at 17:46

## 2019-04-06 RX ADMIN — Medication 1 DROP(S): at 05:02

## 2019-04-06 RX ADMIN — TAMSULOSIN HYDROCHLORIDE 0.4 MILLIGRAM(S): 0.4 CAPSULE ORAL at 21:46

## 2019-04-06 RX ADMIN — MEROPENEM 100 MILLIGRAM(S): 1 INJECTION INTRAVENOUS at 05:03

## 2019-04-06 RX ADMIN — TAMSULOSIN HYDROCHLORIDE 0.4 MILLIGRAM(S): 0.4 CAPSULE ORAL at 00:32

## 2019-04-06 RX ADMIN — Medication 1 DROP(S): at 12:53

## 2019-04-06 RX ADMIN — Medication 64.8 MILLIGRAM(S): at 14:10

## 2019-04-06 RX ADMIN — Medication 60 MILLIGRAM(S): at 05:02

## 2019-04-06 RX ADMIN — Medication 10 MILLIGRAM(S): at 21:46

## 2019-04-06 RX ADMIN — Medication 650 MILLIGRAM(S): at 21:46

## 2019-04-06 RX ADMIN — LORATADINE 10 MILLIGRAM(S): 10 TABLET ORAL at 12:53

## 2019-04-06 RX ADMIN — ENOXAPARIN SODIUM 40 MILLIGRAM(S): 100 INJECTION SUBCUTANEOUS at 21:46

## 2019-04-06 RX ADMIN — Medication 60 MILLIGRAM(S): at 17:41

## 2019-04-06 RX ADMIN — MEROPENEM 100 MILLIGRAM(S): 1 INJECTION INTRAVENOUS at 14:11

## 2019-04-06 NOTE — DIETITIAN INITIAL EVALUATION ADULT. - OTHER INFO
Pt admited for pyelonephritis.  S/p Rt nephrostomy tube placement by IR, right hydronephrosis 2/2 to stone per MD - no acute urologic intervention at this time, plan for outpatient f/u for definitive stone management once infection resolves. Chronic constipation - c/w stool softeners and laxatives. Reason for assessment: referral for EN pta. Pt admitted for pyelonephritis.  S/p Rt nephrostomy tube placement by IR, right hydronephrosis 2/2 to stone per MD - no acute urologic intervention at this time, plan for outpatient f/u for definitive stone management once infection resolves. Chronic constipation - c/w stool softeners and laxatives. Reason for assessment: referral for EN pta.

## 2019-04-06 NOTE — DIETITIAN INITIAL EVALUATION ADULT. - ORAL INTAKE PTA
n/a/pt on EN pta; pt and family does not remember EN regimen however as per group home documents pt was receiving Jevity 1.2 - 237ml at 6:00, 10:00, 14:00, 18:00 and 22:00 via PEG + Prostat 1pkt q 24hrs for total 1525kcal, 81gm protein, 960ml h20  + MVI daily.

## 2019-04-06 NOTE — DIETITIAN INITIAL EVALUATION ADULT. - ENERGY NEEDS
Estimated energy needs: 1430-1550kcal/d (MSJ x 1.2-1.3) compared to 1525kcal/d from home EN regimen   Estimated protein needs: 64-80gm/d (1.2-1.5gm/kg act wt - bedridden pt >60 years old w/high risk for pressure ulcers) compared to 81gm protein/d from home EN regimen  Estimated fluid needs: 1ml/1kcal or as per LIP

## 2019-04-06 NOTE — PROGRESS NOTE ADULT - SUBJECTIVE AND OBJECTIVE BOX
TISH SHAIKH 63y Male  MRN#: [id number]   CODE STATUS:________      SUBJECTIVE  Patient is a 63y old Male who presents with a chief complaint of pyelonephritis (2019 18:11)  Currently admitted to medicine with the primary diagnosis of Pyelonephritis    Today is hospital day 1d, and this morning he is _________ and reports ________ overnight events.       OBJECTIVE  PAST MEDICAL & SURGICAL HISTORY  Asthma  Urinary retention  Urinary calculi  Spastic quadriplegia  Osteoporosis  Seizure  Cerebral palsy  BPH (benign prostatic hyperplasia)  Suprapubic catheter  H/O cystoscopy  S/P percutaneous endoscopic gastrostomy (PEG) tube placement    ALLERGIES:  No Known Allergies    MEDICATIONS:  STANDING MEDICATIONS  artificial  tears Solution 1 Drop(s) Both EYES four times a day  baclofen 10 milliGRAM(s) Oral three times a day  calcium carbonate   1250 mG (OsCal) 1 Tablet(s) Oral two times a day  chlorhexidine 4% Liquid 1 Application(s) Topical <User Schedule>  docusate sodium Liquid 60 milliGRAM(s) Oral two times a day  enoxaparin Injectable 40 milliGRAM(s) SubCutaneous every 24 hours  finasteride 5 milliGRAM(s) Oral daily  loratadine 10 milliGRAM(s) Oral daily  meropenem  IVPB 1000 milliGRAM(s) IV Intermittent every 8 hours  PHENobarbital 64.8 milliGRAM(s) Oral daily  tamsulosin 0.4 milliGRAM(s) Oral at bedtime    PRN MEDICATIONS  acetaminophen  Suppository .. 650 milliGRAM(s) Rectal every 6 hours PRN  ALBUTerol    90 MICROgram(s) HFA Inhaler 2 Puff(s) Inhalation every 6 hours PRN  lactulose Syrup 30 Gram(s) Oral two times a day PRN  magnesium hydroxide Suspension 30 milliLiter(s) Oral daily PRN      VITAL SIGNS: Last 24 Hours  T(C): 37.5 (2019 05:25), Max: 38.3 (2019 19:09)  T(F): 99.5 (2019 05:25), Max: 100.9 (2019 19:09)  HR: 107 (2019 05:25) (99 - 128)  BP: 111/64 (2019 05:25) (103/59 - 148/83)  BP(mean): --  RR: 16 (2019 05:25) (16 - 22)  SpO2: 97% (2019 23:03) (95% - 100%)    LABS:                        11.9   11.96 )-----------( 155      ( 2019 05:46 )             35.7     04-05    144  |  104  |  18  ----------------------------<  119<H>  4.0   |  26  |  0.6<L>    Ca    9.5      2019 10:34  Phos  3.1     04-05  Mg     1.9     04-05    TPro  7.4  /  Alb  4.1  /  TBili  0.6  /  DBili  x   /  AST  21  /  ALT  21  /  AlkPhos  95  04-05    PT/INR - ( 2019 15:35 )   PT: 12.50 sec;   INR: 1.09 ratio         PTT - ( 2019 15:35 )  PTT:30.0 sec  Urinalysis Basic - ( 2019 20:00 )    Color: Red / Appearance: Cloudy / S.010 / pH: x  Gluc: x / Ketone: Negative  / Bili: Negative / Urobili: 0.2   Blood: x / Protein: 100 / Nitrite: Negative   Leuk Esterase: Negative / RBC: >50 /HPF / WBC 6-10 /HPF   Sq Epi: x / Non Sq Epi: Occasional /HPF / Bacteria: x        Lactate, Blood: 1.3 mmol/L (19 @ 10:34)          RADIOLOGY:      PHYSICAL EXAM:    GENERAL: NAD, well-developed, AAOx3  HEENT:  Atraumatic, Normocephalic. EOMI, PERRLA, conjunctiva and sclera clear, No JVD  PULMONARY: Clear to auscultation bilaterally; No wheeze  CARDIOVASCULAR: Regular rate and rhythm; No murmurs, rubs, or gallops  GASTROINTESTINAL: Soft, Nontender, Nondistended; Bowel sounds present  MUSCULOSKELETAL:  2+ Peripheral Pulses, No clubbing, cyanosis, or edema  NEUROLOGY: non-focal  SKIN: No rashes or lesions      ADMISSION SUMMARY  Patient is a 63y old Male who presents with a chief complaint of pyelonephritis (2019 18:11)  Currently admitted to medicine with the primary diagnosis of Pyelonephritis  62 yo M with PMH of cerebral palsy from group home, seizure disorder, spastic paraplegia, s/p PEG tube, BPH, bladder neck stricture s/p suprapubic cath, asthma, nephrolithiasis, chronic constipation presented to ED with low grade fevers and NBNB emesis x3. Also reports some abdominal pain. Collateral history obtained from aide at bedside (spends most of time with him at ). Patient is chronically constipated but does manage to have regular BM's with aide of laxatives. Denies chest pain or SOB.    In ED, CT abd/pel showed R hydroureteronephrosis w/ obstructing calculi and had R PCN tube placed. Pt on meropenem until ID as has hx of Pseudomonas/ESBL proteus.       ASSESSMENT & PLAN  62 yo M with PMH of cerebral palsy from group home, seizure disorder, spastic paraplegia, s/p PEG tube, BPH, bladder neck stricture s/p suprapubic cath, asthma, nephrolithiasis, chronic constipation presented to ED with low grade fevers and NBNB emesis x3. Also admits to pain over suprapubic cath site.    #) Pyelonephritis with R hydrourteronephrosis from obstructing calculi  - CT abd/pel: moderate R hydrourteronephrosis, periureteral fat stranding, 6mm obstructing calc, additional other nonobstructive calc  - UA +ve, T=100.1 in ED  - history of Pseudomonas and ESBL Proteus in urine in past  - c/w Ava for now  - f/u blood + urine Cx  - ID eval  - s/p R nephrostomy tube placement by IR    #) Seizure disorder - c/w Phenobarbital  #) Spastic paraplegia - c/w Baclofen    #) BPH   - c/w Flomax + Finasteride  #) Chronic constipation   - c/w Senna + Colace + Lactulose daily / Milk of Mag PRN  - CT abd/pel: no evidence of sigmoid volvulus, moderate fecal load, no pathological obstruction  #) Asthma - stable, c/w Ventolin PRN    #MISC  - DVT ppx: Lovenox subQ  - Diet: feeds + meds through PEG  - Activity: OOBTC  - Code status: FULL  - Dispo: group home TISH SHAIKH 63y Male  MRN#: [id number]   CODE STATUS:___Full_____      SUBJECTIVE  Patient is a 63y old Male who presents with a chief complaint of pyelonephritis (2019 18:11)  Currently admitted to medicine with the primary diagnosis of Pyelonephritis    Today is hospital day 1d, and this morning he is still feeling a bit of pain but improved and reports no acute overnight events.   Last BM 4/4 as per aid and pt. No BM overnight.       OBJECTIVE  PAST MEDICAL & SURGICAL HISTORY  Asthma  Urinary retention  Urinary calculi  Spastic quadriplegia  Osteoporosis  Seizure  Cerebral palsy  BPH (benign prostatic hyperplasia)  Suprapubic catheter  H/O cystoscopy  S/P percutaneous endoscopic gastrostomy (PEG) tube placement    ALLERGIES:  No Known Allergies    MEDICATIONS:  STANDING MEDICATIONS  artificial  tears Solution 1 Drop(s) Both EYES four times a day  baclofen 10 milliGRAM(s) Oral three times a day  calcium carbonate   1250 mG (OsCal) 1 Tablet(s) Oral two times a day  chlorhexidine 4% Liquid 1 Application(s) Topical <User Schedule>  docusate sodium Liquid 60 milliGRAM(s) Oral two times a day  enoxaparin Injectable 40 milliGRAM(s) SubCutaneous every 24 hours  finasteride 5 milliGRAM(s) Oral daily  loratadine 10 milliGRAM(s) Oral daily  meropenem  IVPB 1000 milliGRAM(s) IV Intermittent every 8 hours  PHENobarbital 64.8 milliGRAM(s) Oral daily  tamsulosin 0.4 milliGRAM(s) Oral at bedtime    PRN MEDICATIONS  acetaminophen  Suppository .. 650 milliGRAM(s) Rectal every 6 hours PRN  ALBUTerol    90 MICROgram(s) HFA Inhaler 2 Puff(s) Inhalation every 6 hours PRN  lactulose Syrup 30 Gram(s) Oral two times a day PRN  magnesium hydroxide Suspension 30 milliLiter(s) Oral daily PRN      VITAL SIGNS: Last 24 Hours  T(C): 37.5 (2019 05:25), Max: 38.3 (2019 19:09)  T(F): 99.5 (2019 05:25), Max: 100.9 (2019 19:09)  HR: 107 (2019 05:25) (99 - 128)  BP: 111/64 (2019 05:25) (103/59 - 148/83)  BP(mean): --  RR: 16 (2019 05:25) (16 - 22)  SpO2: 97% (2019 23:03) (95% - 100%)    LABS:                        11.9   11.96 )-----------( 155      ( 2019 05:46 )             35.7     04-05    144  |  104  |  18  ----------------------------<  119<H>  4.0   |  26  |  0.6<L>    Ca    9.5      2019 10:34  Phos  3.1     04-05  Mg     1.9     04-05    TPro  7.4  /  Alb  4.1  /  TBili  0.6  /  DBili  x   /  AST  21  /  ALT  21  /  AlkPhos  95  04-05    PT/INR - ( 2019 15:35 )   PT: 12.50 sec;   INR: 1.09 ratio         PTT - ( 2019 15:35 )  PTT:30.0 sec  Urinalysis Basic - ( 2019 20:00 )    Color: Red / Appearance: Cloudy / S.010 / pH: x  Gluc: x / Ketone: Negative  / Bili: Negative / Urobili: 0.2   Blood: x / Protein: 100 / Nitrite: Negative   Leuk Esterase: Negative / RBC: >50 /HPF / WBC 6-10 /HPF   Sq Epi: x / Non Sq Epi: Occasional /HPF / Bacteria: x        Lactate, Blood: 1.3 mmol/L (19 @ 10:34)          RADIOLOGY:      PHYSICAL EXAM:    GENERAL: NAD, contracted, cerebral palsy, able to answer his own questions, AAOx2-3  HEENT:  Atraumatic, Normocephalic. EOMI, PERRLA, very poor dental care, anicteric  PULMONARY: coarse breath sounds with gurgling in throat  CARDIOVASCULAR: Regular rate and rhythm; No murmurs  GASTROINTESTINAL: Distended, mildly tense but nontender, Bowel sounds present, PEG tube in place, Percutaneous nephrostomy noted (blood tinged urine in bag), mild R flank tenderness still, suprapubic alatorre in place  EXT:  no edema, contraction of b/l UE  NEUROLOGY: no new deficits   SKIN: No rashes       ADMISSION SUMMARY  Patient is a 63y old Male who presents with a chief complaint of pyelonephritis (2019 18:11)  Currently admitted to medicine with the primary diagnosis of Pyelonephritis  64 yo M with PMH of cerebral palsy from group home, seizure disorder, spastic paraplegia, s/p PEG tube, BPH, bladder neck stricture s/p suprapubic cath, asthma, nephrolithiasis, chronic constipation presented to ED with low grade fevers and NBNB emesis x3. Also reports some abdominal pain. Collateral history obtained from aide at bedside (spends most of time with him at ). Patient is chronically constipated but does manage to have regular BM's with aide of laxatives. Denies chest pain or SOB.    In ED, CT abd/pel showed R hydroureteronephrosis w/ obstructing calculi and had R PCN tube placed. Pt on meropenem until ID as has hx of Pseudomonas/ESBL proteus. Patient also has long history of constipation managed w/ laxatives.       ASSESSMENT & PLAN  64 yo M with PMH of cerebral palsy from group home, seizure disorder, spastic paraplegia, s/p PEG tube, BPH, bladder neck stricture s/p suprapubic cath, asthma, nephrolithiasis, chronic constipation presented to ED with low grade fevers and NBNB emesis x3. Also admits to pain over suprapubic cath site.    #) Pyelonephritis with R hydrourteronephrosis from obstructing calculi  - CT abd/pel: moderate R hydrourteronephrosis, periureteral fat stranding, 6mm obstructing calc, additional other nonobstructive calc  - UA +ve, T=100.1 in ED  - history of Pseudomonas and ESBL Proteus in urine in past  - c/w Ava for now  - f/u blood + urine Cx  - ID eval  - s/p R nephrostomy tube placement by IR    #) Chronic constipation   - c/w Senna + Colace + Lactulose daily / Milk of Mag PRN  - enema x3 today, dulcolax   - hold feeds until abd less distended  - CT abd/pel: no evidence of sigmoid volvulus, moderate fecal load, no pathological obstruction  - Surgery on-board, no intervention at this time    #) Seizure disorder - c/w Phenobarbital  #) Spastic paraplegia - c/w Baclofen    #) BPH   - c/w Flomax + Finasteride    #) Asthma - stable, c/w Ventolin PRN    #MISC  - DVT ppx: Lovenox subQ  - Diet: feeds + meds through PEG  - Activity: OOBTC  - Code status: FULL  - Dispo: group home

## 2019-04-06 NOTE — DIETITIAN INITIAL EVALUATION ADULT. - MD RECOMMEND
Please change TF order to Jevity 1.2- 240ml q 4 hrs ( hold 2am feeds = 5 feeds/day) and add Prostat/Prosource Plus q 24hrs for total of 1525kcal, 81gm protein, 960ml h20), additional fluid per LIP, if no IVF may add 100ml free h20 flush w/ each feed. Please obtain biweekly weights./other

## 2019-04-06 NOTE — PROGRESS NOTE ADULT - SUBJECTIVE AND OBJECTIVE BOX
Progress Note    Subjective  64 y/o Male s/p right PCN for obstructing 6 mm mid ureteral stone, right nephrolithiasis  constipation with distended rectum. Pt lying in bed in NAD    [  ] a 10 point review of systems was negative except where noted    [ x ]  Due to altered mental status/intubation, subjective information was not able t be obtained from the patient.  History was obtained, to the extent possible, from review of the chart and collateral sources of information    Vital signs  T(C): , Max: 38.3 (04-05-19 @ 19:09)  HR: 107 (04-06-19 @ 05:25)  BP: 111/64 (04-06-19 @ 05:25)  SpO2: 97% (04-05-19 @ 23:03)    Gen in NAD, gurgling with deep inspiration  Abd distended soft non tender  + SP tube draining well, blood tinged urine 200cc  + right nephrostomy draining clear to slightly tinged urine 400 cc      Labs                        11.9   11.96 )-----------( 155      ( 06 Apr 2019 05:46 )             35.7     06 Apr 2019 05:46    142    |  105    |  19     ----------------------------<  121    4.1     |  23     |  0.7      Ca    8.8        06 Apr 2019 05:46  Phos  2.9       06 Apr 2019 05:46  Mg     2.1       06 Apr 2019 05:46

## 2019-04-06 NOTE — DIETITIAN INITIAL EVALUATION ADULT. - PHYSICAL APPEARANCE
other (specify)/Alert, oriented. Sister and family friend at bedside. Pt tolerating feeds, had 2 BMs today (on laxatives). BMI - 22 (53kg - actual wt obtained today and reported ht 5'1'). Pt and family reports stable wt. Skin - surgical incision.

## 2019-04-06 NOTE — PROGRESS NOTE ADULT - ASSESSMENT
62 y/o Male s/p right PCN for obstructing 6 mm mid ureteral stone, right nephrolithiasis  constipation with distended rectum. Pt lying in bed in NAD    A) Stable s/p right Nephrostomy tube  nephrolithiasis, right obstructing mid ureteral stone  right hydronephrosis 2/2 to stone.  pyelonephritis  constipation      P) decompress bowels  continue iv abx   no acute urologic intervention at this time.  OP f/u for definitive stone management once infection resolves.  will d/w attending

## 2019-04-07 LAB
-  AMIKACIN: SIGNIFICANT CHANGE UP
-  AMPICILLIN/SULBACTAM: SIGNIFICANT CHANGE UP
-  AMPICILLIN: SIGNIFICANT CHANGE UP
-  AZTREONAM: SIGNIFICANT CHANGE UP
-  CEFAZOLIN: SIGNIFICANT CHANGE UP
-  CEFEPIME: SIGNIFICANT CHANGE UP
-  CEFOXITIN: SIGNIFICANT CHANGE UP
-  CEFTRIAXONE: SIGNIFICANT CHANGE UP
-  CIPROFLOXACIN: SIGNIFICANT CHANGE UP
-  ERTAPENEM: SIGNIFICANT CHANGE UP
-  GENTAMICIN: SIGNIFICANT CHANGE UP
-  LEVOFLOXACIN: SIGNIFICANT CHANGE UP
-  MEROPENEM: SIGNIFICANT CHANGE UP
-  NITROFURANTOIN: SIGNIFICANT CHANGE UP
-  PIPERACILLIN/TAZOBACTAM: SIGNIFICANT CHANGE UP
-  TOBRAMYCIN: SIGNIFICANT CHANGE UP
-  TRIMETHOPRIM/SULFAMETHOXAZOLE: SIGNIFICANT CHANGE UP
CULTURE RESULTS: SIGNIFICANT CHANGE UP
HCT VFR BLD CALC: 33.2 % — LOW (ref 42–52)
HGB BLD-MCNC: 11.3 G/DL — LOW (ref 14–18)
MCHC RBC-ENTMCNC: 31.1 PG — HIGH (ref 27–31)
MCHC RBC-ENTMCNC: 34 G/DL — SIGNIFICANT CHANGE UP (ref 32–37)
MCV RBC AUTO: 91.5 FL — SIGNIFICANT CHANGE UP (ref 80–94)
METHOD TYPE: SIGNIFICANT CHANGE UP
NRBC # BLD: 0 /100 WBCS — SIGNIFICANT CHANGE UP (ref 0–0)
ORGANISM # SPEC MICROSCOPIC CNT: SIGNIFICANT CHANGE UP
ORGANISM # SPEC MICROSCOPIC CNT: SIGNIFICANT CHANGE UP
PLATELET # BLD AUTO: 142 K/UL — SIGNIFICANT CHANGE UP (ref 130–400)
RBC # BLD: 3.63 M/UL — LOW (ref 4.7–6.1)
RBC # FLD: 13.2 % — SIGNIFICANT CHANGE UP (ref 11.5–14.5)
SPECIMEN SOURCE: SIGNIFICANT CHANGE UP
WBC # BLD: 6.56 K/UL — SIGNIFICANT CHANGE UP (ref 4.8–10.8)
WBC # FLD AUTO: 6.56 K/UL — SIGNIFICANT CHANGE UP (ref 4.8–10.8)

## 2019-04-07 PROCEDURE — 99231 SBSQ HOSP IP/OBS SF/LOW 25: CPT

## 2019-04-07 RX ADMIN — LACTULOSE 30 GRAM(S): 10 SOLUTION ORAL at 17:15

## 2019-04-07 RX ADMIN — Medication 650 MILLIGRAM(S): at 00:33

## 2019-04-07 RX ADMIN — FINASTERIDE 5 MILLIGRAM(S): 5 TABLET, FILM COATED ORAL at 11:10

## 2019-04-07 RX ADMIN — Medication 1 DROP(S): at 05:44

## 2019-04-07 RX ADMIN — TAMSULOSIN HYDROCHLORIDE 0.4 MILLIGRAM(S): 0.4 CAPSULE ORAL at 21:36

## 2019-04-07 RX ADMIN — MEROPENEM 100 MILLIGRAM(S): 1 INJECTION INTRAVENOUS at 21:35

## 2019-04-07 RX ADMIN — Medication 60 MILLIGRAM(S): at 17:15

## 2019-04-07 RX ADMIN — Medication 1 DROP(S): at 17:14

## 2019-04-07 RX ADMIN — MEROPENEM 100 MILLIGRAM(S): 1 INJECTION INTRAVENOUS at 05:45

## 2019-04-07 RX ADMIN — Medication 650 MILLIGRAM(S): at 21:38

## 2019-04-07 RX ADMIN — Medication 10 MILLIGRAM(S): at 05:45

## 2019-04-07 RX ADMIN — Medication 60 MILLIGRAM(S): at 05:44

## 2019-04-07 RX ADMIN — Medication 64.8 MILLIGRAM(S): at 11:37

## 2019-04-07 RX ADMIN — Medication 10 MILLIGRAM(S): at 15:13

## 2019-04-07 RX ADMIN — CHLORHEXIDINE GLUCONATE 1 APPLICATION(S): 213 SOLUTION TOPICAL at 05:45

## 2019-04-07 RX ADMIN — Medication 1 TABLET(S): at 17:15

## 2019-04-07 RX ADMIN — Medication 10 MILLIGRAM(S): at 21:36

## 2019-04-07 RX ADMIN — Medication 1 DROP(S): at 11:11

## 2019-04-07 RX ADMIN — MEROPENEM 100 MILLIGRAM(S): 1 INJECTION INTRAVENOUS at 15:12

## 2019-04-07 RX ADMIN — Medication 1 TABLET(S): at 05:44

## 2019-04-07 RX ADMIN — LORATADINE 10 MILLIGRAM(S): 10 TABLET ORAL at 11:10

## 2019-04-07 RX ADMIN — ENOXAPARIN SODIUM 40 MILLIGRAM(S): 100 INJECTION SUBCUTANEOUS at 21:36

## 2019-04-07 RX ADMIN — Medication 1 DROP(S): at 00:33

## 2019-04-07 NOTE — PROGRESS NOTE ADULT - ASSESSMENT
Patient Instructions by Humphrey Stout DO at 11/29/17 03:14 PM     Author:  Humphrey Stout DO Service:  (none) Author Type:  Physician     Filed:  11/29/17 03:14 PM Encounter Date:  11/29/2017 Status:  Signed     :  Humphrey Stout DO (Physician)            Additional Educational Resources:  For additional resources regarding your symptoms, diagnosis, or further health information, please visit the Health Resources section on [email or web address] or the Online Health Resources section in [employer].        Revision History        User Key Date/Time User Provider Type Action    > [N/A] 11/29/17 03:14 PM Humphrey Stout DO Physician Sign             62 yo male s/p R PCN 4/5 for obstructing calculus, doing well    ucx +, bcx-    plan  -continue IV abx  - Is and Os  - op  f/u for laser lithotripsy of ureteral stone 64 yo male s/p R PCN 4/5 for obstructing calculus, doing well    ucx +, bcx-    plan  -continue IV abx  - Is and Os  - ? op  f/u for laser lithotripsy of ureteral stone, Alternatively if the patient is cleared all he is in-house we might be able to take care of his kidney stone prior to him leaving and also upsize the nephrostomy tube until and if he can get his urethra opened up.

## 2019-04-07 NOTE — PROGRESS NOTE ADULT - SUBJECTIVE AND OBJECTIVE BOX
62 yo male s/p R PCN 4/5/19 for obstructing calculus  no events noted over night    Vital Signs Last 24 Hrs  T(C): 36.7 (07 Apr 2019 07:01), Max: 38.6 (06 Apr 2019 21:04)  T(F): 98 (07 Apr 2019 07:01), Max: 101.5 (06 Apr 2019 21:04)  HR: 98 (07 Apr 2019 07:01) (98 - 107)  BP: 98/53 (07 Apr 2019 07:01) (98/53 - 120/56)  RR: 18 (07 Apr 2019 07:01) (16 - 18)    pt seen and examined at bedside  a+ox3, nad, non toxic  abd - soft, nd, + PEG, + SPT draining well  gu- + right pcn with blood tinged urine                          11.9   11.96 )-----------( 155      ( 06 Apr 2019 05:46 )             35.7   04-06    142  |  105  |  19  ----------------------------<  121<H>  4.1   |  23  |  0.7    Ca    8.8      06 Apr 2019 05:46  Phos  2.9     04-06  Mg     2.1     04-06    TPro  5.9<L>  /  Alb  3.3<L>  /  TBili  0.3  /  DBili  x   /  AST  20  /  ALT  17  /  AlkPhos  74  04-06    Culture - Blood (04.05.19 @ 10:46)    Specimen Source: .Blood Blood-Peripheral    Culture Results:   No growth to date.    Culture - Urine (04.05.19 @ 10:34)    Specimen Source: .Urine Clean Catch (Midstream)    Culture Results:   >100,000 CFU/ml Gram Negative Rods

## 2019-04-07 NOTE — PROGRESS NOTE ADULT - SUBJECTIVE AND OBJECTIVE BOX
TISH SHAIKH  [facility] 028 A ([facility])            Patient was evaluated and examined  by bedside, had large bm yesterday, tolerating peg feedings, still spikes fever          REVIEW OF SYSTEMS:  unable to obtain due to h/o chronic MR      T(C): , Max: 38.6 (04-06-19 @ 21:04)  HR: 98 (04-07-19 @ 07:01)  BP: 98/53 (04-07-19 @ 07:01)  RR: 18 (04-07-19 @ 07:01)  SpO2: --  CAPILLARY BLOOD GLUCOSE          PHYSICAL EXAM:  GENERAL: NAD, awake, quadriplegia, patient is laying comfortably in bed  HEENT: AT, NC, PERRLA, SUPPLE, NO JVD, NO CB  LUNG: CTA B/L  CVS: normal S1, S2, RRR, NO M/G/R  ABDOMEN: less distended, peg present, bowel sounds present, normoactive in all 4 quadrants, non-tender, SPC present, right nephrostomy tube present  EXT: no E/C/C, positive PP b/l extremities  NEURO: chronic quadriplegia, with spastic contractures of upper extremities  SKIN: no rash, no ecchymosis      LAB  CBC  Date: 04-07-19 @ 09:54  Mean cell Shcuyuexyq46.1  Mean cell Hemoglobin Conc34.0  Mean cell Volum 91.5  Platelet count-Automate 142  RBC Count 3.63  Red Cell Distrib Width13.2  WBC Count6.56  % Albumin, Urine--  Hematocrit 33.2  Hemoglobin 11.3  CBC  Date: 04-06-19 @ 05:46  Mean cell Ghygpygsbm90.7  Mean cell Hemoglobin Conc33.3  Mean cell Volum 95.2  Platelet count-Automate 155  RBC Count 3.75  Red Cell Distrib Width13.6  WBC Count11.96  % Albumin, Urine--  Hematocrit 35.7  Hemoglobin 11.9  CBC  Date: 04-05-19 @ 10:34  Mean cell Yoimzkanjy99.1  Mean cell Hemoglobin Conc34.3  Mean cell Volum 90.4  Platelet count-Automate 183  RBC Count 4.38  Red Cell Distrib Width13.2  WBC Count8.20  % Albumin, Urine--  Hematocrit 39.6  Hemoglobin 13.6    BMP  04-06-19 @ 05:46  Blood Gas Arterial-Calcium,Ionized--  Blood Urea Nitrogen, Serum 19 mg/dL [10 - 20]  Carbon Dioxide, Serum23 mmol/L [17 - 32]  Chloride, Nadah989 mmol/L [98 - 110]  Creatinie, Serum0.7 mg/dL [0.7 - 1.5]  Glucose, Ykegr557 mg/dL<H> [70 - 99]  Potassium, Serum4.1 mmol/L [3.5 - 5.0]  Sodium, Serum 142 mmol/L [135 - 146]  BMP  04-05-19 @ 10:34  Blood Gas Arterial-Calcium,Ionized--  Blood Urea Nitrogen, Serum 18 mg/dL [10 - 20]  Carbon Dioxide, Serum26 mmol/L [17 - 32]  Chloride, Mahom154 mmol/L [98 - 110]  Creatinie, Serum0.6 mg/dL<L> [0.7 - 1.5]  Glucose, Ryneh490 mg/dL<H> [70 - 99]  Potassium, Serum4.0 mmol/L [3.5 - 5.0]  Sodium, Serum 144 mmol/L [135 - 146]        PT/INR - ( 05 Apr 2019 15:35 )   PT: 12.50 sec;   INR: 1.09 ratio         PTT - ( 05 Apr 2019 15:35 )  PTT:30.0 sec      Microbiology:    Culture - Urine (collected 04-05-19 @ 20:00)  Source: .Urine None  Preliminary Report (04-07-19 @ 09:17):    Moderate Gram Negative Rods    Culture - Blood (collected 04-05-19 @ 10:46)  Source: .Blood Blood-Peripheral  Preliminary Report (04-07-19 @ 01:01):    No growth to date.    Culture - Urine (collected 04-05-19 @ 10:34)  Source: .Urine Clean Catch (Midstream)  Preliminary Report (04-06-19 @ 16:44):    >100,000 CFU/ml Gram Negative Rods    Culture - Blood (collected 04-05-19 @ 10:34)  Source: .Blood Blood-Peripheral  Preliminary Report (04-06-19 @ 19:01):    No growth to date.          Medications:  acetaminophen  Suppository .. 650 milliGRAM(s) Rectal every 6 hours PRN  ALBUTerol    90 MICROgram(s) HFA Inhaler 2 Puff(s) Inhalation every 6 hours PRN  artificial  tears Solution 1 Drop(s) Both EYES four times a day  baclofen 10 milliGRAM(s) Oral three times a day  bisacodyl Suppository 10 milliGRAM(s) Rectal daily PRN  calcium carbonate   1250 mG (OsCal) 1 Tablet(s) Oral two times a day  chlorhexidine 4% Liquid 1 Application(s) Topical <User Schedule>  docusate sodium Liquid 60 milliGRAM(s) Oral two times a day  enoxaparin Injectable 40 milliGRAM(s) SubCutaneous every 24 hours  finasteride 5 milliGRAM(s) Oral daily  lactulose Syrup 30 Gram(s) Oral two times a day  loratadine 10 milliGRAM(s) Oral daily  magnesium hydroxide Suspension 30 milliLiter(s) Oral daily PRN  meropenem  IVPB 1000 milliGRAM(s) IV Intermittent every 8 hours  PHENobarbital 64.8 milliGRAM(s) Oral daily  tamsulosin 0.4 milliGRAM(s) Oral at bedtime        Assessment and Plan:  #) Acute  Pyelonephritis with R hydrourteronephrosis from obstructing calculi  - CT abd/pel: moderate R hydrourteronephrosis, periureteral fat stranding, 6mm obstructing calc, additional other nonobstructive calc  - patient has history of Pseudomonas and ESBL Proteus in urine in past- placed on contact isolation.  - IV Meropenem  - f/u blood cxs- negative   + urine Cx- gram negative rods- f/up sensitivity  - s/p R nephrostomy tube placement due to nephrocalcinosis  by IR - will need outpatient  - lithotripsy procedure    #) Seizure disorder - c/w Phenobarbital    #) Spastic paraplegia - c/w Baclofen, bed confinement status, decub. prevention tx.     #) BPH - c/w Flomax + Finasteride    #) Chronic constipation - c/w Senna + Colace + Lactulose / Milk of Mag PRN  - CT abd/pel: no evidence of sigmoid volvulus, moderate fecal load, no pathological obstruction    #) h/o Peg placement- continue peg feedings as tolerated with residual monitoring    #) Asthma - stable, c/w Ventolin PRN .       #Progress Note Handoff: Pending Consults_________,Tests________,Test Results_urine cxs______,Other;  Family discussion: caregiver Disposition: group home

## 2019-04-08 DIAGNOSIS — Z93.1 GASTROSTOMY STATUS: ICD-10-CM

## 2019-04-08 DIAGNOSIS — N40.0 BENIGN PROSTATIC HYPERPLASIA WITHOUT LOWER URINARY TRACT SYMPTOMS: ICD-10-CM

## 2019-04-08 DIAGNOSIS — G40.909 EPILEPSY, UNSPECIFIED, NOT INTRACTABLE, WITHOUT STATUS EPILEPTICUS: ICD-10-CM

## 2019-04-08 DIAGNOSIS — T83.018A BREAKDOWN (MECHANICAL) OF OTHER URINARY CATHETER, INITIAL ENCOUNTER: ICD-10-CM

## 2019-04-08 DIAGNOSIS — N48.1 BALANITIS: ICD-10-CM

## 2019-04-08 DIAGNOSIS — N35.919 UNSPECIFIED URETHRAL STRICTURE, MALE, UNSPECIFIED SITE: ICD-10-CM

## 2019-04-08 DIAGNOSIS — G80.1 SPASTIC DIPLEGIC CEREBRAL PALSY: ICD-10-CM

## 2019-04-08 DIAGNOSIS — J45.909 UNSPECIFIED ASTHMA, UNCOMPLICATED: ICD-10-CM
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
## 2019-04-08 DIAGNOSIS — M81.0 AGE-RELATED OSTEOPOROSIS WITHOUT CURRENT PATHOLOGICAL FRACTURE: ICD-10-CM

## 2019-04-08 PROCEDURE — 99232 SBSQ HOSP IP/OBS MODERATE 35: CPT

## 2019-04-08 RX ORDER — POLYETHYLENE GLYCOL 3350 17 G/17G
17 POWDER, FOR SOLUTION ORAL
Qty: 0 | Refills: 0 | Status: DISCONTINUED | OUTPATIENT
Start: 2019-04-08 | End: 2019-04-12

## 2019-04-08 RX ORDER — IBUPROFEN 200 MG
200 TABLET ORAL THREE TIMES A DAY
Qty: 0 | Refills: 0 | Status: DISCONTINUED | OUTPATIENT
Start: 2019-04-08 | End: 2019-04-12

## 2019-04-08 RX ORDER — MEROPENEM 1 G/30ML
500 INJECTION INTRAVENOUS EVERY 6 HOURS
Qty: 0 | Refills: 0 | Status: DISCONTINUED | OUTPATIENT
Start: 2019-04-08 | End: 2019-04-09

## 2019-04-08 RX ORDER — PANTOPRAZOLE SODIUM 20 MG/1
40 TABLET, DELAYED RELEASE ORAL
Qty: 0 | Refills: 0 | Status: DISCONTINUED | OUTPATIENT
Start: 2019-04-08 | End: 2019-04-12

## 2019-04-08 RX ADMIN — Medication 650 MILLIGRAM(S): at 00:49

## 2019-04-08 RX ADMIN — MEROPENEM 100 MILLIGRAM(S): 1 INJECTION INTRAVENOUS at 23:10

## 2019-04-08 RX ADMIN — Medication 1 TABLET(S): at 05:37

## 2019-04-08 RX ADMIN — Medication 1 DROP(S): at 23:01

## 2019-04-08 RX ADMIN — Medication 1 DROP(S): at 17:47

## 2019-04-08 RX ADMIN — CHLORHEXIDINE GLUCONATE 1 APPLICATION(S): 213 SOLUTION TOPICAL at 05:37

## 2019-04-08 RX ADMIN — LORATADINE 10 MILLIGRAM(S): 10 TABLET ORAL at 12:12

## 2019-04-08 RX ADMIN — Medication 10 MILLIGRAM(S): at 05:37

## 2019-04-08 RX ADMIN — Medication 60 MILLIGRAM(S): at 05:36

## 2019-04-08 RX ADMIN — Medication 1 DROP(S): at 00:48

## 2019-04-08 RX ADMIN — Medication 1 DROP(S): at 05:37

## 2019-04-08 RX ADMIN — Medication 200 MILLIGRAM(S): at 23:01

## 2019-04-08 RX ADMIN — Medication 64.8 MILLIGRAM(S): at 13:15

## 2019-04-08 RX ADMIN — ENOXAPARIN SODIUM 40 MILLIGRAM(S): 100 INJECTION SUBCUTANEOUS at 21:38

## 2019-04-08 RX ADMIN — Medication 200 MILLIGRAM(S): at 21:38

## 2019-04-08 RX ADMIN — MEROPENEM 100 MILLIGRAM(S): 1 INJECTION INTRAVENOUS at 17:47

## 2019-04-08 RX ADMIN — Medication 10 MILLIGRAM(S): at 21:38

## 2019-04-08 RX ADMIN — FINASTERIDE 5 MILLIGRAM(S): 5 TABLET, FILM COATED ORAL at 12:12

## 2019-04-08 RX ADMIN — Medication 1 DROP(S): at 12:12

## 2019-04-08 RX ADMIN — MEROPENEM 100 MILLIGRAM(S): 1 INJECTION INTRAVENOUS at 05:37

## 2019-04-08 RX ADMIN — TAMSULOSIN HYDROCHLORIDE 0.4 MILLIGRAM(S): 0.4 CAPSULE ORAL at 21:38

## 2019-04-08 RX ADMIN — MEROPENEM 100 MILLIGRAM(S): 1 INJECTION INTRAVENOUS at 12:12

## 2019-04-08 RX ADMIN — LACTULOSE 30 GRAM(S): 10 SOLUTION ORAL at 05:36

## 2019-04-08 RX ADMIN — Medication 1 TABLET(S): at 17:47

## 2019-04-08 RX ADMIN — Medication 10 MILLIGRAM(S): at 13:16

## 2019-04-08 NOTE — CONSULT NOTE ADULT - ASSESSMENT
62 yo M with PMH of cerebral palsy from group home, seizure disorder, spastic paraplegia, s/p PEG tube, BPH, bladder neck stricture s/p suprapubic cath, asthma, nephrolithiasis, chronic constipation presented to ED with low grade fevers and NBNB emesis x3. Also admits to pain over suprapubic cath site.      IMPRESSION:  Sepsis secondary to acute Pyelonephritis with R hydroureteronephrosis from obstructing calculi s/p R Nephrostomy tube by IR  Sepsis secondary to ESBL E coli    RECOMMENDATIONS:  Meropenem 500 mg iv q6h  Midline  On discharge Ertapenem 1 gm iv q24h for 14 days in all

## 2019-04-08 NOTE — PROGRESS NOTE ADULT - SUBJECTIVE AND OBJECTIVE BOX
Interventional Radiology Follow- Up Note      63y Male s/p right nephrostomy catheter placement on  4/5/19  in Interventional Radiology with Dr. Landau      O/N:     No complaints offered.    Vitals: T(F): 96.5 (04-08-19 @ 05:00), Max: 100.7 (04-07-19 @ 21:15)  HR: 90 (04-08-19 @ 05:00) (85 - 100)  BP: 125/67 (04-08-19 @ 05:00) (107/69 - 125/67)  RR: 16 (04-08-19 @ 05:00) (15 - 17)  SpO2: --  Wt(kg): --    LABS:                        11.3   6.56  )-----------( 142      ( 07 Apr 2019 09:54 )             33.2           Impression: 63y Male admitted with PYELONEPHRITIS      Assessment/Plan:  Post Right Nephrostomy catheter placement on 4/5/19 without complications. Now with poorly draining suprapubic catheter.     // Suprapubic catheter exchange  	- Urology requesting upsize in tube and tube continues to drain poorly  - will obtain consent from sister Leslie   - Plan for exchange tomorrow; NPO at midnight, CBC/CMP and coags in AM. Hold morning Lovenox dose.    // Right Nephrostomy tube   	- on 4/5/19 without complications   - white count downtrending       Please call Interventional Radiology [phone] with any questions, concerns, or issues regarding above.

## 2019-04-08 NOTE — PROGRESS NOTE ADULT - SUBJECTIVE AND OBJECTIVE BOX
Progress Note    Subjective  62 yo male s/p R PCN 4/5/19 for obstructing calculus.  Pt with low grade T 100.7 last pm. No events noted over night.      [  ] a 10 point review of systems was negative except where noted    [ x ]  Due to altered mental status/intubation, subjective information was not able t be obtained from the patient.  History was obtained, to the extent possible, from review of the chart and collateral sources of information      Vital signs  T(C): , Max: 38.2 (04-07-19 @ 21:15)  HR: 90 (04-08-19 @ 05:00)  BP: 125/67 (04-08-19 @ 05:00)    Gen in NAD  Abd soft non tender SP tube draining clear yellow urine  Nephrostomy drains clear urine      Labs                        11.3   6.56  )-----------( 142      ( 07 Apr 2019 09:54 )             33.2         Comprehensive Metabolic Panel (04.06.19 @ 05:46)    Sodium, Serum: 142 mmol/L    Potassium, Serum: 4.1 mmol/L    Chloride, Serum: 105 mmol/L    Carbon Dioxide, Serum: 23 mmol/L    Anion Gap, Serum: 14 mmol/L    Blood Urea Nitrogen, Serum: 19 mg/dL    Creatinine, Serum: 0.7 mg/dL    Glucose, Serum: 121 mg/dL    Calcium, Total Serum: 8.8 mg/dL    Protein Total, Serum: 5.9 g/dL    Albumin, Serum: 3.3 g/dL    Bilirubin Total, Serum: 0.3 mg/dL    Alkaline Phosphatase, Serum: 74 U/L    Aspartate Aminotransferase (AST/SGOT): 20 U/L    Alanine Aminotransferase (ALT/SGPT): 17 U/L    eGFR if Non : 100: Interpretative comment  The units for eGFR are mL/min/1.73M2 (normalized body surface area). The  eGFR is calculated from a serum creatinine using the CKD-EPI equation.  Other variables required for calculation are race, age and sex. Among  patients with chronic kidney disease (CKD), the eGFR is useful in  determining the stage of disease according to KDOQI CKD classification.  All eGFR results are reported numerically with the following  interpretation.          GFR                    With                 Without     (ml/min/1.73 m2)    Kidney Damage       Kidney Damage        >= 90                    Stage 1                     Normal        60-89                    Stage 2                     Decreased GFR        30-59     Stage 3                     Stage 3        15-29                    Stage 4                     Stage 4        < 15                      Stage 5                     Stage 5  Each stage of CKD assumes that the associated GFR level has been in  effect for at least 3 months. Determination of stages one and two (with  eGFR > 59 ml/min/m2) requires estimation of kidney damage for at least 3  months as defined by structural or functional abnormalities.  Limitations: All estimates of GFR will be less accurate for patients at  extremes of muscle mass (including but not limited to frail elderly,  critically ill, or cancer patients), those with unusual diets, and those  with conditions associated with reduced secretion or extrarenal  elimination of creatinine. The eGFR equation is not recommended for use  in patients with unstable creatinine levels. mL/min/1.73M2    eGFR if African American: 116 mL/min/1.73M2  Progress Note    Subjective  64 yo male s/p R PCN 4/5/19 for obstructing calculus.  Pt with low grade T 100.7 last pm. No events noted over night.      [  ] a 10 point review of systems was negative except where noted    [ x ]  Due to altered mental status/intubation, subjective information was not able t be obtained from the patient.  History was obtained, to the extent possible, from review of the chart and collateral sources of information      Vital signs  T(C): , Max: 38.2 (04-07-19 @ 21:15)  HR: 90 (04-08-19 @ 05:00)  BP: 125/67 (04-08-19 @ 05:00)    Gen in NAD  Abd soft non tender SP tube draining clear yellow urine  right Nephrostomy drains clear urine  : #14 f SPC -- clear   Ext : contracture UE and LE  Skin : warm   Neuro ; verbal. ?? understands  Labs                        11.3   6.56  )-----------( 142      ( 07 Apr 2019 09:54 )             33.2         Comprehensive Metabolic Panel (04.06.19 @ 05:46)    Sodium, Serum: 142 mmol/L    Potassium, Serum: 4.1 mmol/L    Chloride, Serum: 105 mmol/L    Carbon Dioxide, Serum: 23 mmol/L    Anion Gap, Serum: 14 mmol/L    Blood Urea Nitrogen, Serum: 19 mg/dL    Creatinine, Serum: 0.7 mg/dL    Glucose, Serum: 121 mg/dL    Calcium, Total Serum: 8.8 mg/dL    Protein Total, Serum: 5.9 g/dL    Albumin, Serum: 3.3 g/dL    Bilirubin Total, Serum: 0.3 mg/dL    Alkaline Phosphatase, Serum: 74 U/L    Aspartate Aminotransferase (AST/SGOT): 20 U/L    Alanine Aminotransferase (ALT/SGPT): 17 U/L    eGFR if Non : 100: Interpretative comment  The units for eGFR are mL/min/1.73M2 (normalized body surface area). The  eGFR is calculated from a serum creatinine using the CKD-EPI equation.  Other variables required for calculation are race, age and sex. Among  patients with chronic kidney disease (CKD), the eGFR is useful in  determining the stage of disease according to KDOQI CKD classification.  All eGFR results are reported numerically with the following  interpretation.          GFR                    With                 Without     (ml/min/1.73 m2)    Kidney Damage       Kidney Damage        >= 90                    Stage 1                     Normal        60-89                    Stage 2                     Decreased GFR        30-59     Stage 3                     Stage 3        15-29                    Stage 4                     Stage 4        < 15                      Stage 5                     Stage 5  Each stage of CKD assumes that the associated GFR level has been in  effect for at least 3 months. Determination of stages one and two (with  eGFR > 59 ml/min/m2) requires estimation of kidney damage for at least 3  months as defined by structural or functional abnormalities.  Limitations: All estimates of GFR will be less accurate for patients at  extremes of muscle mass (including but not limited to frail elderly,  critically ill, or cancer patients), those with unusual diets, and those  with conditions associated with reduced secretion or extrarenal  elimination of creatinine. The eGFR equation is not recommended for use  in patients with unstable creatinine levels. mL/min/1.73M2    eGFR if African American: 116 mL/min/1.73M2

## 2019-04-08 NOTE — PROGRESS NOTE ADULT - SUBJECTIVE AND OBJECTIVE BOX
SUBJECTIVE:    Patient is a 63y old Male who presents with a chief complaint of pyelonephritis (07 Apr 2019 11:21)    Currently admitted to medicine with the primary diagnosis of Pyelonephritis     Today is hospital day 3d. This morning he is resting comfortably in bed and reports no new issues or overnight events. Pt is endorsing pain near nephrostomy site    PAST MEDICAL & SURGICAL HISTORY  Asthma  Urinary retention  Urinary calculi  Spastic quadriplegia  Osteoporosis  Seizure  Cerebral palsy  BPH (benign prostatic hyperplasia)  Suprapubic catheter  H/O cystoscopy  S/P percutaneous endoscopic gastrostomy (PEG) tube placement    SOCIAL HISTORY:  Negative for smoking/alcohol/drug use.     ALLERGIES:  No Known Allergies    MEDICATIONS:  STANDING MEDICATIONS  artificial  tears Solution 1 Drop(s) Both EYES four times a day  baclofen 10 milliGRAM(s) Oral three times a day  calcium carbonate   1250 mG (OsCal) 1 Tablet(s) Oral two times a day  chlorhexidine 4% Liquid 1 Application(s) Topical <User Schedule>  docusate sodium Liquid 60 milliGRAM(s) Oral two times a day  enoxaparin Injectable 40 milliGRAM(s) SubCutaneous every 24 hours  finasteride 5 milliGRAM(s) Oral daily  lactulose Syrup 30 Gram(s) Oral two times a day  loratadine 10 milliGRAM(s) Oral daily  meropenem  IVPB 1000 milliGRAM(s) IV Intermittent every 8 hours  PHENobarbital 64.8 milliGRAM(s) Oral daily  tamsulosin 0.4 milliGRAM(s) Oral at bedtime    PRN MEDICATIONS  acetaminophen  Suppository .. 650 milliGRAM(s) Rectal every 6 hours PRN  ALBUTerol    90 MICROgram(s) HFA Inhaler 2 Puff(s) Inhalation every 6 hours PRN  bisacodyl Suppository 10 milliGRAM(s) Rectal daily PRN  magnesium hydroxide Suspension 30 milliLiter(s) Oral daily PRN    VITALS:   T(F): 96.5  HR: 90  BP: 125/67  RR: 16  SpO2: --    CAPILLARY BLOOD GLUCOSE      LABS:                        11.3   6.56  )-----------( 142      ( 07 Apr 2019 09:54 )             33.2                     Culture - Urine (collected 05 Apr 2019 20:00)  Source: .Urine None  Preliminary Report (07 Apr 2019 09:17):    Moderate Gram Negative Rods    Culture - Blood (collected 05 Apr 2019 10:46)  Source: .Blood Blood-Peripheral  Preliminary Report (07 Apr 2019 01:01):    No growth to date.    Culture - Urine (collected 05 Apr 2019 10:34)  Source: .Urine Clean Catch (Midstream)  Final Report (07 Apr 2019 16:11):    >100,000 CFU/ml Proteus mirabilis ESBL  Organism: Proteus mirabilis ESBL (07 Apr 2019 16:11)  Organism: Proteus mirabilis ESBL (07 Apr 2019 16:11)    Culture - Blood (collected 05 Apr 2019 10:34)  Source: .Blood Blood-Peripheral  Preliminary Report (06 Apr 2019 19:01):    No growth to date.          RADIOLOGY:    PHYSICAL EXAM:  GENERAL: NAD, contracted, cerebral palsy, able to answer his own questions, AAOx2  HEENT:  Atraumatic, Normocephalic. EOMI, PERRLA, very poor dental care, anicteric  PULMONARY: coarse breath sounds with gurgling in throat  CARDIOVASCULAR: Regular rate and rhythm; No murmurs  GASTROINTESTINAL: Distended, mildly tense but nontender, Bowel sounds present, PEG tube in place, Percutaneous nephrostomy noted (blood tinged urine in bag), mild R flank tenderness still, suprapubic alatorre in place  EXT:  no edema, contraction of b/l UE  NEUROLOGY: no new deficits   SKIN: No rashes

## 2019-04-08 NOTE — PROGRESS NOTE ADULT - ASSESSMENT
62 yo M with PMH of cerebral palsy from group home, seizure disorder, spastic paraplegia, s/p PEG tube, BPH, bladder neck stricture s/p suprapubic cath, asthma, nephrolithiasis, chronic constipation presented to ED with low grade fevers and NBNB emesis x3. Also admits to pain over suprapubic cath site.    #) Pyelonephritis with R hydrourteronephrosis from obstructing calculi s/p R Nephrostomy tube by IR  - CT abd/pel: moderate R hydrourteronephrosis, periureteral fat stranding, 6mm obstructing calc, additional other nonobstructive calc  - Tmax in the last 24 hours - 100.7 - possibly also attributable to pain near nephrostomy site  - no overt evidence of sepsis  - history of Pseudomonas and ESBL Proteus in urine in past  - c/w Ava for now  - Blood culture negative to date  - f/u Ucx  - ID eval - pending  - will start non-opioidal pain management for now  - as per urology and IR: outpatient  f/u for laser lithotripsy for ureteral stone    #) Chronic constipation - Resolved  - c/w Senna + Colace + Lactulose daily / Milk of Mag PRN  - CT abd/pel: no evidence of sigmoid volvulus, moderate fecal load, no pathological obstruction      #) Seizure disorder - c/w Phenobarbital  - f/u Pheno tanvir level in am    #) Spastic paraplegia - c/w Baclofen    #) BPH   - c/w Flomax + Finasteride    #) Asthma - stable, c/w Ventolin PRN    #MISC  - DVT ppx: Lovenox subQ  - Diet: feeds + meds through PEG  - Activity: OOBTC  - Code status: FULL  - Dispo: group home

## 2019-04-08 NOTE — PROGRESS NOTE ADULT - ASSESSMENT
64 yo male s/p R PCN 4/5/19 for obstructing calculus.  Pt with low grade T 100.7 last pm. No events noted over night.    A) Low grade Temp  stable s/p right PCN  pyelonephritis    P) Continue Abx as per ID  Pt will need SP tube upsized to larger tube prior to d/c  IR consult for upsizing of SP tube.  OP f/u for definitive stone management.  will d/w attending

## 2019-04-08 NOTE — CONSULT NOTE ADULT - SUBJECTIVE AND OBJECTIVE BOX
HAWATISH SCHUSTER  63y, Male  Allergy: No Known Allergies      HPI:  62 yo M with PMH of cerebral palsy from group home, seizure disorder, spastic paraplegia, s/p PEG tube, BPH, bladder neck stricture s/p suprapubic cath, asthma, nephrolithiasis, chronic constipation presented to ED with low grade fevers and NBNB emesis x3. Also reports some abdominal pain. Collateral history obtained from aide at bedside (spends most of time with him at ). Patient is chronically constipated but does manage to have regular BM's with aide of laxatives. Denies chest pain or SOB.    In ED, CT abd/pel showed R hydroureteronephrosis w/ obstructing calculi. (05 Apr 2019 17:21)    FAMILY HISTORY:  Family history unknown    PAST MEDICAL & SURGICAL HISTORY:  Asthma  Urinary retention  Urinary calculi  Spastic quadriplegia  Osteoporosis  Seizure  Cerebral palsy  BPH (benign prostatic hyperplasia)  Suprapubic catheter  H/O cystoscopy  S/P percutaneous endoscopic gastrostomy (PEG) tube placement        VITALS:  T(F): 96.5, Max: 100.7 (04-07-19 @ 21:15)  HR: 90  BP: 125/67  RR: 16Vital Signs Last 24 Hrs  T(C): 35.8 (08 Apr 2019 05:00), Max: 38.2 (07 Apr 2019 21:15)  T(F): 96.5 (08 Apr 2019 05:00), Max: 100.7 (07 Apr 2019 21:15)  HR: 90 (08 Apr 2019 05:00) (85 - 100)  BP: 125/67 (08 Apr 2019 05:00) (107/69 - 125/67)  BP(mean): 63 (07 Apr 2019 21:15) (63 - 63)  RR: 16 (08 Apr 2019 05:00) (15 - 17)  SpO2: --    TESTS & MEASUREMENTS:                        11.3   6.56  )-----------( 142      ( 07 Apr 2019 09:54 )             33.2               Culture - Urine (collected 04-05-19 @ 20:00)  Source: .Urine None  Preliminary Report (04-07-19 @ 09:17):    Moderate Gram Negative Rods    Culture - Blood (collected 04-05-19 @ 10:46)  Source: .Blood Blood-Peripheral  Preliminary Report (04-07-19 @ 01:01):    No growth to date.    Culture - Urine (collected 04-05-19 @ 10:34)  Source: .Urine Clean Catch (Midstream)  Final Report (04-07-19 @ 16:11):    >100,000 CFU/ml Proteus mirabilis ESBL  Organism: Proteus mirabilis ESBL (04-07-19 @ 16:11)  Organism: Proteus mirabilis ESBL (04-07-19 @ 16:11)      -  Amikacin: S <=16      -  Ampicillin: R >16 These ampicillin results predict results for amoxicillin      -  Ampicillin/Sulbactam: R <=8/4      -  Aztreonam: R >16      -  Cefazolin: R >16 For uncomplicated UTI with K. pneumoniae, E. coli, or P. mirablis: MIGUEL A <=16 is sensitive and MIGUEL A >=32 is resistant. This also predicts results for oral agents cefaclor, cefdinir, cefpodoxime, cefprozil, cefuroxime axetil, cephalexin and locarbef for uncomplicated UTI. Note that some isolates may be susceptible to these agents while testing resistant to cefazolin.      -  Cefepime: R >16      -  Cefoxitin: S <=8      -  Ceftriaxone: R >32 Enterobacter, Citrobacter, and Serratia may develop resistance during prolonged therapy      -  Ciprofloxacin: R >2      -  Ertapenem: S <=1      -  Gentamicin: S <=4      -  Levofloxacin: R >4      -  Meropenem: S <=1      -  Nitrofurantoin: R >64 Should not be used to treat pyelonephritis      -  Piperacillin/Tazobactam: R <=16      -  Tobramycin: S <=4      -  Trimethoprim/Sulfamethoxazole: R >2/38      Method Type: MIGUEL A    Culture - Blood (collected 04-05-19 @ 10:34)  Source: .Blood Blood-Peripheral  Preliminary Report (04-06-19 @ 19:01):    No growth to date.            RADIOLOGY & ADDITIONAL TESTS:    ANTIBIOTICS:  meropenem  IVPB 1000 milliGRAM(s) IV Intermittent every 8 hours

## 2019-04-09 ENCOUNTER — APPOINTMENT (OUTPATIENT)
Dept: NEPHROLOGY | Facility: CLINIC | Age: 64
End: 2019-04-09

## 2019-04-09 ENCOUNTER — TRANSCRIPTION ENCOUNTER (OUTPATIENT)
Age: 64
End: 2019-04-09

## 2019-04-09 LAB
ANION GAP SERPL CALC-SCNC: 11 MMOL/L — SIGNIFICANT CHANGE UP (ref 7–14)
APTT BLD: 30 SEC — SIGNIFICANT CHANGE UP (ref 27–39.2)
BASOPHILS # BLD AUTO: 0.02 K/UL — SIGNIFICANT CHANGE UP (ref 0–0.2)
BASOPHILS NFR BLD AUTO: 0.3 % — SIGNIFICANT CHANGE UP (ref 0–1)
BUN SERPL-MCNC: 17 MG/DL — SIGNIFICANT CHANGE UP (ref 10–20)
CALCIUM SERPL-MCNC: 9.1 MG/DL — SIGNIFICANT CHANGE UP (ref 8.5–10.1)
CHLORIDE SERPL-SCNC: 107 MMOL/L — SIGNIFICANT CHANGE UP (ref 98–110)
CO2 SERPL-SCNC: 26 MMOL/L — SIGNIFICANT CHANGE UP (ref 17–32)
CREAT SERPL-MCNC: 0.5 MG/DL — LOW (ref 0.7–1.5)
EOSINOPHIL # BLD AUTO: 0.35 K/UL — SIGNIFICANT CHANGE UP (ref 0–0.7)
EOSINOPHIL NFR BLD AUTO: 5.8 % — SIGNIFICANT CHANGE UP (ref 0–8)
GLUCOSE SERPL-MCNC: 108 MG/DL — HIGH (ref 70–99)
HCT VFR BLD CALC: 36.4 % — LOW (ref 42–52)
HGB BLD-MCNC: 12.2 G/DL — LOW (ref 14–18)
IMM GRANULOCYTES NFR BLD AUTO: 0.2 % — SIGNIFICANT CHANGE UP (ref 0.1–0.3)
INR BLD: 1.08 RATIO — SIGNIFICANT CHANGE UP (ref 0.65–1.3)
LYMPHOCYTES # BLD AUTO: 1.06 K/UL — LOW (ref 1.2–3.4)
LYMPHOCYTES # BLD AUTO: 17.6 % — LOW (ref 20.5–51.1)
MCHC RBC-ENTMCNC: 31.1 PG — HIGH (ref 27–31)
MCHC RBC-ENTMCNC: 33.5 G/DL — SIGNIFICANT CHANGE UP (ref 32–37)
MCV RBC AUTO: 92.9 FL — SIGNIFICANT CHANGE UP (ref 80–94)
MONOCYTES # BLD AUTO: 0.73 K/UL — HIGH (ref 0.1–0.6)
MONOCYTES NFR BLD AUTO: 12.1 % — HIGH (ref 1.7–9.3)
NEUTROPHILS # BLD AUTO: 3.85 K/UL — SIGNIFICANT CHANGE UP (ref 1.4–6.5)
NEUTROPHILS NFR BLD AUTO: 64 % — SIGNIFICANT CHANGE UP (ref 42.2–75.2)
NRBC # BLD: 0 /100 WBCS — SIGNIFICANT CHANGE UP (ref 0–0)
PHENOBARB SERPL-MCNC: 9.9 UG/ML — LOW (ref 15–40)
PLATELET # BLD AUTO: 171 K/UL — SIGNIFICANT CHANGE UP (ref 130–400)
POTASSIUM SERPL-MCNC: 4.2 MMOL/L — SIGNIFICANT CHANGE UP (ref 3.5–5)
POTASSIUM SERPL-SCNC: 4.2 MMOL/L — SIGNIFICANT CHANGE UP (ref 3.5–5)
PROTHROM AB SERPL-ACNC: 12.4 SEC — SIGNIFICANT CHANGE UP (ref 9.95–12.87)
RBC # BLD: 3.92 M/UL — LOW (ref 4.7–6.1)
RBC # FLD: 13.2 % — SIGNIFICANT CHANGE UP (ref 11.5–14.5)
SODIUM SERPL-SCNC: 144 MMOL/L — SIGNIFICANT CHANGE UP (ref 135–146)
WBC # BLD: 6.02 K/UL — SIGNIFICANT CHANGE UP (ref 4.8–10.8)
WBC # FLD AUTO: 6.02 K/UL — SIGNIFICANT CHANGE UP (ref 4.8–10.8)

## 2019-04-09 RX ORDER — ERTAPENEM SODIUM 1 G/1
1000 INJECTION, POWDER, LYOPHILIZED, FOR SOLUTION INTRAMUSCULAR; INTRAVENOUS EVERY 24 HOURS
Qty: 0 | Refills: 0 | Status: DISCONTINUED | OUTPATIENT
Start: 2019-04-10 | End: 2019-04-10

## 2019-04-09 RX ORDER — POLYETHYLENE GLYCOL 3350 17 G/17G
17 POWDER, FOR SOLUTION ORAL
Qty: 0 | Refills: 0 | DISCHARGE
Start: 2019-04-09

## 2019-04-09 RX ORDER — ERTAPENEM SODIUM 1 G/1
1000 INJECTION, POWDER, LYOPHILIZED, FOR SOLUTION INTRAMUSCULAR; INTRAVENOUS ONCE
Qty: 0 | Refills: 0 | Status: COMPLETED | OUTPATIENT
Start: 2019-04-09 | End: 2019-04-09

## 2019-04-09 RX ORDER — ERTAPENEM SODIUM 1 G/1
INJECTION, POWDER, LYOPHILIZED, FOR SOLUTION INTRAMUSCULAR; INTRAVENOUS
Qty: 0 | Refills: 0 | Status: DISCONTINUED | OUTPATIENT
Start: 2019-04-09 | End: 2019-04-10

## 2019-04-09 RX ORDER — POLYETHYLENE GLYCOL 3350 17 G/17G
17 POWDER, FOR SOLUTION ORAL
Qty: 0 | Refills: 0 | COMMUNITY
Start: 2019-04-09

## 2019-04-09 RX ADMIN — ERTAPENEM SODIUM 120 MILLIGRAM(S): 1 INJECTION, POWDER, LYOPHILIZED, FOR SOLUTION INTRAMUSCULAR; INTRAVENOUS at 08:50

## 2019-04-09 RX ADMIN — Medication 200 MILLIGRAM(S): at 13:41

## 2019-04-09 RX ADMIN — Medication 200 MILLIGRAM(S): at 13:11

## 2019-04-09 RX ADMIN — TAMSULOSIN HYDROCHLORIDE 0.4 MILLIGRAM(S): 0.4 CAPSULE ORAL at 21:53

## 2019-04-09 RX ADMIN — CHLORHEXIDINE GLUCONATE 1 APPLICATION(S): 213 SOLUTION TOPICAL at 05:24

## 2019-04-09 RX ADMIN — Medication 1 DROP(S): at 05:24

## 2019-04-09 RX ADMIN — Medication 1 DROP(S): at 17:14

## 2019-04-09 RX ADMIN — Medication 64.8 MILLIGRAM(S): at 11:47

## 2019-04-09 RX ADMIN — Medication 1 TABLET(S): at 17:14

## 2019-04-09 RX ADMIN — Medication 10 MILLIGRAM(S): at 21:53

## 2019-04-09 RX ADMIN — MEROPENEM 100 MILLIGRAM(S): 1 INJECTION INTRAVENOUS at 05:24

## 2019-04-09 RX ADMIN — Medication 200 MILLIGRAM(S): at 22:04

## 2019-04-09 RX ADMIN — Medication 1 DROP(S): at 11:47

## 2019-04-09 RX ADMIN — FINASTERIDE 5 MILLIGRAM(S): 5 TABLET, FILM COATED ORAL at 11:47

## 2019-04-09 RX ADMIN — Medication 1 DROP(S): at 23:44

## 2019-04-09 RX ADMIN — LORATADINE 10 MILLIGRAM(S): 10 TABLET ORAL at 11:47

## 2019-04-09 RX ADMIN — Medication 10 MILLIGRAM(S): at 13:11

## 2019-04-09 RX ADMIN — Medication 200 MILLIGRAM(S): at 21:52

## 2019-04-09 RX ADMIN — ENOXAPARIN SODIUM 40 MILLIGRAM(S): 100 INJECTION SUBCUTANEOUS at 21:53

## 2019-04-09 NOTE — PROGRESS NOTE ADULT - NSHPATTENDINGPLANDISCUSS_GEN_ALL_CORE
house staff
pts bedside caregiver and gu staff
pa
Dr. Joyner, his aide, and CATIE PA staff
house staff
house staff

## 2019-04-09 NOTE — DISCHARGE NOTE PROVIDER - NSDCFUSCHEDAPPT_GEN_ALL_CORE_FT
TISH SHAIKH ; 04/11/2019 ; [facility] PreAdmits  TISH SHAIKH ; 04/16/2019 ; [facility] PreAdmits TISH SHAIKH ; 04/11/2019 ; [facility] PreAdmits  TISH SHAIKH ; 04/16/2019 ; [facility] PreAdmits TISH SHAIKH ; 04/11/2019 ; [facility] PreAdmits  TISH SHAIKH ; 04/16/2019 ; [facility] PreAdmits TISH SHAIKH ; 04/11/2019 ; [facility] PreAdmits  TISH SHAIKH ; 04/16/2019 ; [facility] PreAdmits TISH SHAIKH ; 04/16/2019 ; [facility] PreAdmits TISH SHAIKH ; 04/16/2019 ; [facility] PreAdmits  TISH SHAIKH ; 04/23/2019 ; [facility] PreAdmits TISH SHAIKH ; 04/16/2019 ; [facility] PreAdmits  TISH SHAIKH ; 04/23/2019 ; [facility] PreAdmits

## 2019-04-09 NOTE — DISCHARGE NOTE NURSING/CASE MANAGEMENT/SOCIAL WORK - NSDCDPATPORTLINK_GEN_ALL_CORE
You can access the [facility] Patient Portal, offered by [facility], by registering with the following website: [email or web address]

## 2019-04-09 NOTE — CHART NOTE - NSCHARTNOTEFT_GEN_A_CORE
Registered Dietitian Follow-Up     Patient Profile Reviewed                           Yes [x]   No []     Nutrition History Previously Obtained        Yes [x]  No []       Pertinent Subjective Information: Pt NPO after midnight last night/today for IR procedure. Previously: Jevity 237ml q4h. Tolerating feeds well per RN.     Pertinent Medical Interventions:    X Pyelonephritis with R hydroureteronephrosis from obstructing calculi s/p R nephrostomy tube by IR  X chronic constipation resolved  X spastic paraplegia    Diet order: Jevity 237ml q4h providing 1710kcal, 79gm protein, 1152ml h2o     Anthropometrics:  - Ht. 61"  - Wt. 117# no updated wts  - %wt change  - BMI 22  - #+/-10%     Pertinent Lab Data: (4/9) hgb 12.2, hct 36.4, Cr 0.5, s gluc 108     Pertinent Meds: lovenox, ertapenem, dulcolax, protonix, lactulose, albuterol, oscal, colace, proscar     Physical Findings:  - Appearance: aaox2  - GI function: last BM 4/8 no GI distress  - Tubes: PEG feeds  - Oral/Mouth cavity: NPO  - Skin: R nephrostomy placement site     Nutrition Requirements  Weight Used: 117# per IA     Estimated Energy Needs    Continue [x]  Adjust [] 1430-1550kcal/d (MSJ x 1.2-1.3) compared to 1525kcal/d from home EN regimen   Adjusted Energy Recommendations:   kcal/day        Estimated Protein Needs    Continue [x]  Adjust [] 64-80gm/d (1.2-1.5gm/kg act wt - bedridden pt >60 years old w/high risk for pressure ulcers) compared to 81gm protein/d from home EN regimen  Adjusted Protein Recommendations:   gm/day        Estimated Fluid Needs        Continue [x]  Adjust [] 1ml/1kcal or as per LIP  Adjusted Fluid Recommendations:   mL/day     Nutrient Intake: pt previously NPO for procedure. TF at goal rate exceeds estimated needs        [] Previous Nutrition Diagnosis: excessive enteral nutrition infusion            [x] Ongoing          [] Resolved    [] No active nutrition diagnosis identified at this time       Nutrition Intervention enteral nutrition   RECS: Please change TF order to Jevity 1.2- 240ml q 4 hrs ( hold 2am feeds = 5 feeds/day) and add Prostat/Prosource Plus q 24hrs for total of 1525kcal, 81gm protein, 960ml h20), additional fluid per LIP    Goal/Expected Outcome: EN to provide > 85% but < 105% estimated needs in 3 days     Indicator/Monitoring: RD will monitor diet order, energy intake, labs, body composition, NFPF.    Pt continues at risk f/u 3 days Registered Dietitian Follow-Up     Patient Profile Reviewed                           Yes [x]   No []     Nutrition History Previously Obtained        Yes [x]  No []       Pertinent Subjective Information: Pt NPO after midnight last night/today for IR procedure. Previously: Jevity 237ml q4h. Tolerating feeds well per RN.     Pertinent Medical Interventions:    X Pyelonephritis with R hydroureteronephrosis from obstructing calculi s/p R nephrostomy tube by IR  X chronic constipation resolved  X spastic paraplegia    Diet order: Jevity 237ml q4h providing 1710kcal, 79gm protein, 1152ml h2o     Anthropometrics:  - Ht. 61"  - Wt. 117# no updated wts  - %wt change  - BMI 22  - #+/-10%     Pertinent Lab Data: (4/9) hgb 12.2, hct 36.4, Cr 0.5, s gluc 108     Pertinent Meds: lovenox, ertapenem, dulcolax, protonix, lactulose, albuterol, oscal, colace, proscar     Physical Findings:  - Appearance: aaox2  - GI function: last BM 4/8 no GI distress  - Tubes: PEG feeds  - Oral/Mouth cavity: NPO  - Skin: R nephrostomy placement site     Nutrition Requirements  Weight Used: 117# per IA     Estimated Energy Needs    Continue [x]  Adjust [] 1430-1550kcal/d (MSJ x 1.2-1.3) compared to 1525kcal/d from home EN regimen   Adjusted Energy Recommendations:   kcal/day        Estimated Protein Needs    Continue [x]  Adjust [] 64-80gm/d (1.2-1.5gm/kg act wt - bedridden pt >60 years old w/high risk for pressure ulcers) compared to 81gm protein/d from home EN regimen  Adjusted Protein Recommendations:   gm/day        Estimated Fluid Needs        Continue [x]  Adjust [] 1ml/1kcal or as per LIP  Adjusted Fluid Recommendations:   mL/day     Nutrient Intake: pt previously NPO for procedure. TF at goal rate exceeds estimated needs        [] Previous Nutrition Diagnosis: excessive enteral nutrition infusion            [x] Ongoing          [] Resolved    [] No active nutrition diagnosis identified at this time       Nutrition Intervention enteral nutrition   RECS: Please change TF order to Jevity 1.2- 240ml q 4 hrs ( hold 2am feeds = 5 feeds/day) and add Prosource TF q 24hrs for total of 1480kcal, 78gm protein, 960ml h20), additional fluid per LIP. Meets 100% estimated needs. Monitor biweekly wts and adjust TF PRN.    Goal/Expected Outcome: EN to provide > 85% but < 105% estimated needs in 3 days     Indicator/Monitoring: RD will monitor diet order, energy intake, labs, body composition, NFPF.    Pt continues at risk f/u 3 days

## 2019-04-09 NOTE — PROGRESS NOTE ADULT - ASSESSMENT
· Assessment		  64 yo M with PMH of cerebral palsy from group home, seizure disorder, spastic paraplegia, s/p PEG tube, BPH, bladder neck stricture s/p suprapubic cath, asthma, nephrolithiasis, chronic constipation presented to ED with low grade fevers and NBNB emesis x3. Also admits to pain over suprapubic cath site.      IMPRESSION:  Sepsis secondary to acute Pyelonephritis with R hydroureteronephrosis from obstructing calculi s/p R Nephrostomy tube by IR  Sepsis secondary to ESBL E coli  WBC 6.5    RECOMMENDATIONS:  Meropenem 500 mg iv q6h  Midline  On discharge Ertapenem 1 gm iv q24h for 14 days in all

## 2019-04-09 NOTE — PROGRESS NOTE ADULT - ASSESSMENT
64 yo M with PMH of cerebral palsy from group home, seizure disorder, spastic paraplegia, s/p PEG tube, BPH, bladder neck stricture s/p suprapubic cath, asthma, nephrolithiasis, chronic constipation presented to ED with low grade fevers and NBNB emesis x3. Also admits to pain over suprapubic cath site.    #) Pyelonephritis with R hydrourteronephrosis from obstructing calculi s/p R Nephrostomy tube by IR  - CT abd/pel: moderate R hydrourteronephrosis, periureteral fat stranding, 6mm obstructing calc, additional other nonobstructive calc  - Pt no longer spiking fevers   - UCx: Proteus ESBL  - Pt switched to meropenem  - Blood culture negative to date  - Mid line in place for IV Abx for 2 weeks  - Pt going for resizing of SPC today by IR  - as per urology and IR: outpatient  f/u for laser lithotripsy for ureteral stone  - will a/c d/c tomorrow as pt will need to go to SNF for IV abx (pt is from group home)    #) Chronic constipation - Resolved  - c/w Senna + Colace + Lactulose daily / Milk of Mag PRN  - CT abd/pel: no evidence of sigmoid volvulus, moderate fecal load, no pathological obstruction      #) Seizure disorder - c/w Phenobarbital    #) Spastic paraplegia - c/w Baclofen    #) BPH   - c/w Flomax + Finasteride    #) Asthma - stable, c/w Ventolin PRN    #MISC  - DVT ppx: Lovenox subQ  - Diet: feeds + meds through PEG  - Activity: OOBTC  - Code status: FULL  - Dispo: group home 64 yo M with PMH of cerebral palsy from group home, seizure disorder, spastic paraplegia, s/p PEG tube, BPH, bladder neck stricture s/p suprapubic cath, asthma, nephrolithiasis, chronic constipation presented to ED with low grade fevers and NBNB emesis x3. Also admits to pain over suprapubic cath site.    #) Pyelonephritis with R hydrourteronephrosis from obstructing calculi s/p R Nephrostomy tube by IR  - CT abd/pel: moderate R hydrourteronephrosis, periureteral fat stranding, 6mm obstructing calc, additional other nonobstructive calc  - Pt no longer spiking fevers   - UCx: Proteus ESBL  - Pt switched to meropenem  - Blood culture negative to date  - Mid line in place for IV Abx for 2 weeks  - Pt going for resizing of SPC today by IR  - as per urology and IR: outpatient  f/u for laser lithotripsy for ureteral stone  - will a/c d/c tomorrow as pt will need to go to SNF for IV abx (pt is from group home)    #) Chronic constipation - Resolved  - c/w Senna + Colace + Lactulose daily / Milk of Mag PRN  - CT abd/pel: no evidence of sigmoid volvulus, moderate fecal load, no pathological obstruction      #) Seizure disorder - c/w Phenobarbital    #) Spastic paraplegia - c/w Baclofen    #) BPH   - c/w Flomax + Finasteride    #) Asthma - stable, c/w Ventolin PRN    #MISC  - DVT ppx: Lovenox subQ  - Diet: feeds + meds through PEG  - Activity: OOBTC  - Code status: FULL  - Dispo: SNF requiring IV Abx

## 2019-04-09 NOTE — DISCHARGE NOTE PROVIDER - CARE PROVIDER_API CALL
Jenaro Cooley)  Urology  [address] [place] [address], [place] [address]  Phone: [phone]  Fax: [phone]  Follow Up Time: 2 weeks    Solis Joyner)  Surgical Physicians  [address] [place] [address], [facility] [address]  [place] [address]  Phone: [phone]  Fax: [phone]  Follow Up Time: 2 weeks

## 2019-04-09 NOTE — DISCHARGE NOTE PROVIDER - CARE PROVIDERS DIRECT ADDRESSES
[email or web address]

## 2019-04-09 NOTE — DISCHARGE NOTE PROVIDER - NSDCCPCAREPLAN_GEN_ALL_CORE_FT
PRINCIPAL DISCHARGE DIAGNOSIS  Diagnosis: Pyelonephritis  Assessment and Plan of Treatment: Please complete your course of abx as prescribed. Please follow up with urologist after completion of antibiotics to undergo lithotripsy of your kidney stone      SECONDARY DISCHARGE DIAGNOSES  Diagnosis: Chronic constipation  Assessment and Plan of Treatment: please take your bowel regimen as prescrbed. encourage bowel movements every day PRINCIPAL DISCHARGE DIAGNOSIS  Diagnosis: Pyelonephritis  Assessment and Plan of Treatment: Please complete your course of abx as prescribed. Please follow up with urologist after completion of antibiotics to undergo lithotripsy of your kidney stone - Outpatient follow up for antegrade ureteroscopy on 4/23/19 at [facility]      SECONDARY DISCHARGE DIAGNOSES  Diagnosis: Chronic constipation  Assessment and Plan of Treatment: please take your bowel regimen as prescrbed. encourage bowel movements every day

## 2019-04-09 NOTE — DISCHARGE NOTE PROVIDER - HOSPITAL COURSE
64 yo M with PMH of cerebral palsy from group home, seizure disorder, spastic paraplegia, s/p PEG tube, BPH, bladder neck stricture s/p suprapubic cath, asthma, nephrolithiasis, chronic constipation presented to ED with low grade fevers and NBNB emesis x3. Also admits to pain over suprapubic cath site.        #) Pyelonephritis with R hydrourteronephrosis from obstructing calculi s/p R Nephrostomy tube by IR    - CT abd/pel: moderate R hydrourteronephrosis, periureteral fat stranding, 6mm obstructing calc, additional other nonobstructive calc    - Pt no longer spiking fevers     - UCx: Proteus ESBL    - Pt switched to meropenem    - Blood culture negative to date    - Mid line in place for IV Abx for 2 weeks    - Pt going for resizing of SPC today by IR    - as per urology and IR: outpatient  f/u for laser lithotripsy for ureteral stone            #) Chronic constipation - Resolved    - c/w Senna + Colace + Lactulose daily / Milk of Mag PRN    - CT abd/pel: no evidence of sigmoid volvulus, moderate fecal load, no pathological obstruction            #) Seizure disorder - c/w Phenobarbital        #) Spastic paraplegia - c/w Baclofen        #) BPH     - c/w Flomax + Finasteride        #) Asthma - stable, c/w Ventolin PRN 64 yo M with PMH of cerebral palsy from group home, seizure disorder, spastic paraplegia, s/p PEG tube, BPH, bladder neck stricture s/p suprapubic cath, asthma, nephrolithiasis, chronic constipation presented to ED with low grade fevers and NBNB emesis x3. Also admits to pain over suprapubic cath site.        #) Pyelonephritis with R hydrourteronephrosis from obstructing calculi s/p R Nephrostomy tube by IR    - CT abd/pel: moderate R hydrourteronephrosis, periureteral fat stranding, 6mm obstructing calc, additional other nonobstructive calc    - Pt no longer spiking fevers     - UCx: Proteus ESBL    - Pt switched to meropenem    - Blood culture negative to date    - Mid line in place for IV Abx for 2 weeks    - Pt going for resizing of SPC today by IR    - as per urology and IR: outpatient  f/u for laser lithotripsy for ureteral stone            #) Chronic constipation - Resolved    - c/w Senna + Colace + Lactulose daily / Milk of Mag PRN    - CT abd/pel: no evidence of sigmoid volvulus, moderate fecal load, no pathological obstruction            #) Seizure disorder - c/w Phenobarbital        #) Spastic paraplegia - c/w Baclofen        #) BPH     - c/w Flomax + Finasteride        #) Asthma - stable, c/w Ventolin PRN        Attending NotE:     Patient seen and examined independently. I personally had a face-to-face encounter with the patient, examined the patient myself and reviewed the plan of care with the     housestaff. Agree with resident's note but my note supersedes that of the resident in the matters hereby listed.         Patient stable for d/c back to his group home after the "upgrade of his SPC" today by IR. 64 yo M with PMH of cerebral palsy from group home, seizure disorder, spastic paraplegia, s/p PEG tube, BPH, bladder neck stricture s/p suprapubic cath, asthma, nephrolithiasis, chronic constipation presented to ED with low grade fevers and NBNB emesis x3. Also admits to pain over suprapubic cath site.        #) Pyelonephritis with R hydrourteronephrosis from obstructing calculi s/p R Nephrostomy tube by IR    - CT abd/pel: moderate R hydrourteronephrosis, periureteral fat stranding, 6mm obstructing calc, additional other nonobstructive calc    - Pt no longer spiking fevers     - UCx: Proteus ESBL    - Pt switched to meropenem    - Blood culture negative to date    - Mid line in place for IV Abx for 2 weeks    - Pt going for resizing of SPC today by IR    - as per urology and IR: outpatient  f/u for laser lithotripsy for ureteral stone    - Outpatient F/U for antegrade ureteroscopy on 4/23/19 at [facility]                #) Chronic constipation - Resolved    - c/w Senna + Colace + Lactulose daily / Milk of Mag PRN    - CT abd/pel: no evidence of sigmoid volvulus, moderate fecal load, no pathological obstruction            #) Seizure disorder - c/w Phenobarbital        #) Spastic paraplegia - c/w Baclofen        #) BPH     - c/w Flomax + Finasteride        #) Asthma - stable, c/w Ventolin PRN        Attending NotE:     Patient seen and examined independently. I personally had a face-to-face encounter with the patient, examined the patient myself and reviewed the plan of care with the     housestaff. Agree with resident's note but my note supersedes that of the resident in the matters hereby listed.         Patient stable for d/c back to his group home after the "upgrade of his SPC" today by IR. 64 yo M with PMH of cerebral palsy from group home, seizure disorder, spastic paraplegia, s/p PEG tube, BPH, bladder neck stricture s/p suprapubic cath, asthma, nephrolithiasis, chronic constipation presented to ED with low grade fevers and NBNB emesis x3. Also admits to pain over suprapubic cath site.        #) Pyelonephritis with R hydrourteronephrosis from obstructing calculi s/p R Nephrostomy tube by IR    - CT abd/pel: moderate R hydrourteronephrosis, periureteral fat stranding, 6mm obstructing calc, additional other nonobstructive calc    - Pt no longer spiking fevers     - UCx: Proteus ESBL    - Pt switched to meropenem    - Blood culture negative to date    - Mid line in place for IV Abx for 2 weeks    - Pt going for resizing of SPC today by IR    - as per urology and IR: outpatient  f/u for laser lithotripsy for ureteral stone    - Outpatient F/U for antegrade ureteroscopy on 4/23/19 at [facility]                #) Chronic constipation - Resolved    - c/w Senna + Colace + Lactulose daily / Milk of Mag PRN    - CT abd/pel: no evidence of sigmoid volvulus, moderate fecal load, no pathological obstruction            #) Seizure disorder - c/w Phenobarbital        #) Spastic paraplegia - c/w Baclofen        #) BPH     - c/w Flomax + Finasteride        #) Asthma - stable, c/w Ventolin PRN        Attending NotE:     Patient seen and examined independently. I personally had a face-to-face encounter with the patient, examined the patient myself and reviewed the plan of care with the     housestaff. Agree with resident's note but my note supersedes that of the resident in the matters hereby listed.         Patient stable for d/c back to his group home after the "upgrade of his SPC" today by IR.         Attending addendum :     patient monitored overnight and instead of yest being discharged today.

## 2019-04-09 NOTE — PROGRESS NOTE ADULT - SUBJECTIVE AND OBJECTIVE BOX
pt with urosepsis secondary to UTI up stream from a right ureteral stone with urinary retention with a small SP tube placed elsewhere  .  VITAL SIGNS:  T(C): 36.7 (04-09-19 @ 12:31), Max: 37.1 (04-08-19 @ 20:42)  T(F): 98.1 (04-09-19 @ 12:31), Max: 98.7 (04-08-19 @ 20:42)  HR: 74 (04-09-19 @ 12:31) (62 - 99)  BP: 102/62 (04-09-19 @ 12:31) (97/57 - 111/63)  RR: 18 (04-09-19 @ 12:31) (17 - 18)  SpO2: 98% (04-09-19 @ 08:22) (98% - 98%)      04-08-19 @ 07:01  -  04-09-19 @ 07:00  --------------------------------------------------------  IN: 0 mL / OUT: 1500 mL / NET: -1500 mL    04-09-19 @ 07:01  -  04-09-19 @ 17:58  --------------------------------------------------------  IN: 0 mL / OUT: 400 mL / NET: -400 mL        Creatinine, Serum: 0.5 mg/dL (04-09-19 @ 06:50)  Creatinine, Serum: 0.7 mg/dL (04-06-19 @ 05:46)  Creatinine, Serum: 0.6 mg/dL (04-05-19 @ 10:34)      Hemoglobin: 12.2 g/dL (04-09-19 @ 06:50)  Hemoglobin: 11.3 g/dL (04-07-19 @ 09:54)  Hemoglobin: 11.9 g/dL (04-06-19 @ 05:46)  Hemoglobin: 13.6 g/dL (04-05-19 @ 10:34)      WBC Count: 6.02 K/uL (04-09-19 @ 06:50)  WBC Count: 6.56 K/uL (04-07-19 @ 09:54)  WBC Count: 11.96 K/uL (04-06-19 @ 05:46)  WBC Count: 8.20 K/uL (04-05-19 @ 10:34)      INR: 1.08 ratio (04-09-19 @ 06:50)  INR: 1.09 ratio (04-05-19 @ 15:35)              Culture - Urine (collected 04-05-19 @ 20:00)  Source: .Urine None  Final Report (04-08-19 @ 09:44):    Moderate Proteus mirabilis ESBL  Organism: Proteus mirabilis ESBL (04-08-19 @ 09:44)  Organism: Proteus mirabilis ESBL (04-08-19 @ 09:44)      -  Amikacin: S <=16      -  Ampicillin: R >16 These ampicillin results predict results for amoxicillin      -  Ampicillin/Sulbactam: R <=8/4      -  Aztreonam: R 16      -  Cefazolin: R >16 For uncomplicated UTI with K. pneumoniae, E. coli, or P. mirablis: MIGUEL A <=16 is sensitive and MIGUEL A >=32 is resistant. This also predicts results for oral agents cefaclor, cefdinir, cefpodoxime, cefprozil, cefuroxime axetil, cephalexin and locarbef for uncomplicated UTI. Note that some isolates may be susceptible to these agents while testing resistant to cefazolin.      -  Cefepime: R >16      -  Cefoxitin: S <=8      -  Ceftriaxone: R >32 Enterobacter, Citrobacter, and Serratia may develop resistance during prolonged therapy      -  Ciprofloxacin: R >2      -  Ertapenem: S <=1      -  Gentamicin: S <=4      -  Levofloxacin: R >4      -  Meropenem: S <=1      -  Nitrofurantoin: R >64 Should not be used to treat pyelonephritis      -  Piperacillin/Tazobactam: R <=16      -  Tobramycin: S <=4      -  Trimethoprim/Sulfamethoxazole: R >2/38      Method Type: MIGUEL A    Culture - Urine (collected 04-05-19 @ 10:34)  Source: .Urine Clean Catch (Midstream)  Final Report (04-07-19 @ 16:11):    >100,000 CFU/ml Proteus mirabilis ESBL  Organism: Proteus mirabilis ESBL (04-07-19 @ 16:11)  Organism: Proteus mirabilis ESBL (04-07-19 @ 16:11)      -  Amikacin: S <=16      -  Ampicillin: R >16 These ampicillin results predict results for amoxicillin      -  Ampicillin/Sulbactam: R <=8/4      -  Aztreonam: R >16      -  Cefazolin: R >16 For uncomplicated UTI with K. pneumoniae, E. coli, or P. mirablis: MIGUEL A <=16 is sensitive and MIGUEL A >=32 is resistant. This also predicts results for oral agents cefaclor, cefdinir, cefpodoxime, cefprozil, cefuroxime axetil, cephalexin and locarbef for uncomplicated UTI. Note that some isolates may be susceptible to these agents while testing resistant to cefazolin.      -  Cefepime: R >16      -  Cefoxitin: S <=8      -  Ceftriaxone: R >32 Enterobacter, Citrobacter, and Serratia may develop resistance during prolonged therapy      -  Ciprofloxacin: R >2      -  Ertapenem: S <=1      -  Gentamicin: S <=4      -  Levofloxacin: R >4      -  Meropenem: S <=1      -  Nitrofurantoin: R >64 Should not be used to treat pyelonephritis      -  Piperacillin/Tazobactam: R <=16      -  Tobramycin: S <=4      -  Trimethoprim/Sulfamethoxazole: R >2/38      Method Type: MIGUEL A            04-09    144  |  107  |  17  ----------------------------<  108<H>  4.2   |  26  |  0.5<L>    Ca    9.1      09 Apr 2019 06:50        acetaminophen  Suppository .. 650 milliGRAM(s) Rectal every 6 hours PRN  ALBUTerol    90 MICROgram(s) HFA Inhaler 2 Puff(s) Inhalation every 6 hours PRN  artificial  tears Solution 1 Drop(s) Both EYES four times a day  baclofen 10 milliGRAM(s) Oral three times a day  bisacodyl Suppository 10 milliGRAM(s) Rectal at bedtime  calcium carbonate   1250 mG (OsCal) 1 Tablet(s) Oral two times a day  chlorhexidine 4% Liquid 1 Application(s) Topical <User Schedule>  docusate sodium Liquid 60 milliGRAM(s) Oral two times a day  enoxaparin Injectable 40 milliGRAM(s) SubCutaneous every 24 hours  ertapenem  IVPB      finasteride 5 milliGRAM(s) Oral daily  ibuprofen  Tablet. 200 milliGRAM(s) Oral three times a day  lactulose Syrup 30 Gram(s) Oral two times a day  loratadine 10 milliGRAM(s) Oral daily  magnesium hydroxide Suspension 30 milliLiter(s) Oral daily PRN  pantoprazole    Tablet 40 milliGRAM(s) Oral before breakfast  PHENobarbital 64.8 milliGRAM(s) Oral daily  polyethylene glycol 3350 17 Gram(s) Oral two times a day  tamsulosin 0.4 milliGRAM(s) Oral at bedtime          PHYSICAL EXAM:    Constitutional: WDWN resting comfortably in bed;   mentally status poor (pt mentally handicapped)    still with small SP tube and PCN not changed to ay due to emergencies in IR

## 2019-04-09 NOTE — PROGRESS NOTE ADULT - SUBJECTIVE AND OBJECTIVE BOX
SUBJECTIVE:    Patient is a 63y old Male who presents with a chief complaint of pyelonephritis (09 Apr 2019 09:58)    Currently admitted to medicine with the primary diagnosis of Pyelonephritis     Today is hospital day 4d. This morning he is resting comfortably in bed and reports no new issues or overnight events.     PAST MEDICAL & SURGICAL HISTORY  Asthma  Urinary retention  Urinary calculi  Spastic quadriplegia  Osteoporosis  Seizure  Cerebral palsy  BPH (benign prostatic hyperplasia)  Suprapubic catheter  H/O cystoscopy  S/P percutaneous endoscopic gastrostomy (PEG) tube placement    SOCIAL HISTORY:  Negative for smoking/alcohol/drug use.     ALLERGIES:  No Known Allergies    MEDICATIONS:  STANDING MEDICATIONS  artificial  tears Solution 1 Drop(s) Both EYES four times a day  baclofen 10 milliGRAM(s) Oral three times a day  bisacodyl Suppository 10 milliGRAM(s) Rectal at bedtime  calcium carbonate   1250 mG (OsCal) 1 Tablet(s) Oral two times a day  chlorhexidine 4% Liquid 1 Application(s) Topical <User Schedule>  docusate sodium Liquid 60 milliGRAM(s) Oral two times a day  enoxaparin Injectable 40 milliGRAM(s) SubCutaneous every 24 hours  ertapenem  IVPB      finasteride 5 milliGRAM(s) Oral daily  ibuprofen  Tablet. 200 milliGRAM(s) Oral three times a day  lactulose Syrup 30 Gram(s) Oral two times a day  loratadine 10 milliGRAM(s) Oral daily  pantoprazole    Tablet 40 milliGRAM(s) Oral before breakfast  PHENobarbital 64.8 milliGRAM(s) Oral daily  polyethylene glycol 3350 17 Gram(s) Oral two times a day  tamsulosin 0.4 milliGRAM(s) Oral at bedtime    PRN MEDICATIONS  acetaminophen  Suppository .. 650 milliGRAM(s) Rectal every 6 hours PRN  ALBUTerol    90 MICROgram(s) HFA Inhaler 2 Puff(s) Inhalation every 6 hours PRN  magnesium hydroxide Suspension 30 milliLiter(s) Oral daily PRN    VITALS:   T(F): 96.4  HR: 62  BP: 111/63  RR: 17  SpO2: 98%    CAPILLARY BLOOD GLUCOSE      LABS:                        12.2   6.02  )-----------( 171      ( 09 Apr 2019 06:50 )             36.4     04-09    144  |  107  |  17  ----------------------------<  108<H>  4.2   |  26  |  0.5<L>    Ca    9.1      09 Apr 2019 06:50      PT/INR - ( 09 Apr 2019 06:50 )   PT: 12.40 sec;   INR: 1.08 ratio         PTT - ( 09 Apr 2019 06:50 )  PTT:30.0 sec              RADIOLOGY:    PHYSICAL EXAM:  GENERAL: NAD, contracted, cerebral palsy, able to answer his own questions, AAOx2  HEENT:  Atraumatic, Normocephalic. EOMI, PERRLA, very poor dental care, anicteric  PULMONARY: coarse breath sounds with gurgling in throat  CARDIOVASCULAR: Regular rate and rhythm; No murmurs  GASTROINTESTINAL: Distended, mildly tense but nontender, Bowel sounds present, PEG tube in place, Percutaneous nephrostomy noted (blood tinged urine in bag), mild R flank tenderness still, suprapubic alatorre in place  EXT:  no edema, contraction of b/l UE  NEUROLOGY: no new deficits   SKIN: No rashes

## 2019-04-09 NOTE — PROGRESS NOTE ADULT - SUBJECTIVE AND OBJECTIVE BOX
HAWATISH WAYNE  63y, Male      OVERNIGHT EVENTS:    no fevers, does try to respond  his usual self    VITALS:  T(F): 96.4, Max: 98.7 (04-08-19 @ 20:42)  HR: 62  BP: 111/63  RR: 17Vital Signs Last 24 Hrs  T(C): 35.8 (09 Apr 2019 05:58), Max: 37.1 (08 Apr 2019 20:42)  T(F): 96.4 (09 Apr 2019 05:58), Max: 98.7 (08 Apr 2019 20:42)  HR: 62 (09 Apr 2019 05:58) (62 - 99)  BP: 111/63 (09 Apr 2019 05:58) (97/57 - 111/63)  BP(mean): --  RR: 17 (09 Apr 2019 05:58) (17 - 18)  SpO2: 98% (08 Apr 2019 15:16) (98% - 98%)    TESTS & MEASUREMENTS:                        12.2   6.02  )-----------( 171      ( 09 Apr 2019 06:50 )             36.4     04-09    144  |  107  |  17  ----------------------------<  108<H>  4.2   |  26  |  0.5<L>    Ca    9.1      09 Apr 2019 06:50          Culture - Urine (collected 04-05-19 @ 20:00)  Source: .Urine None  Final Report (04-08-19 @ 09:44):    Moderate Proteus mirabilis ESBL  Organism: Proteus mirabilis ESBL (04-08-19 @ 09:44)  Organism: Proteus mirabilis ESBL (04-08-19 @ 09:44)      -  Amikacin: S <=16      -  Ampicillin: R >16 These ampicillin results predict results for amoxicillin      -  Ampicillin/Sulbactam: R <=8/4      -  Aztreonam: R 16      -  Cefazolin: R >16 For uncomplicated UTI with K. pneumoniae, E. coli, or P. mirablis: MIGUEL A <=16 is sensitive and MIGUEL A >=32 is resistant. This also predicts results for oral agents cefaclor, cefdinir, cefpodoxime, cefprozil, cefuroxime axetil, cephalexin and locarbef for uncomplicated UTI. Note that some isolates may be susceptible to these agents while testing resistant to cefazolin.      -  Cefepime: R >16      -  Cefoxitin: S <=8      -  Ceftriaxone: R >32 Enterobacter, Citrobacter, and Serratia may develop resistance during prolonged therapy      -  Ciprofloxacin: R >2      -  Ertapenem: S <=1      -  Gentamicin: S <=4      -  Levofloxacin: R >4      -  Meropenem: S <=1      -  Nitrofurantoin: R >64 Should not be used to treat pyelonephritis      -  Piperacillin/Tazobactam: R <=16      -  Tobramycin: S <=4      -  Trimethoprim/Sulfamethoxazole: R >2/38      Method Type: MIGUEL A    Culture - Blood (collected 04-05-19 @ 10:46)  Source: .Blood Blood-Peripheral  Preliminary Report (04-07-19 @ 01:01):    No growth to date.    Culture - Urine (collected 04-05-19 @ 10:34)  Source: .Urine Clean Catch (Midstream)  Final Report (04-07-19 @ 16:11):    >100,000 CFU/ml Proteus mirabilis ESBL  Organism: Proteus mirabilis ESBL (04-07-19 @ 16:11)  Organism: Proteus mirabilis ESBL (04-07-19 @ 16:11)      -  Amikacin: S <=16      -  Ampicillin: R >16 These ampicillin results predict results for amoxicillin      -  Ampicillin/Sulbactam: R <=8/4      -  Aztreonam: R >16      -  Cefazolin: R >16 For uncomplicated UTI with K. pneumoniae, E. coli, or P. mirablis: MIGUEL A <=16 is sensitive and MIGUEL A >=32 is resistant. This also predicts results for oral agents cefaclor, cefdinir, cefpodoxime, cefprozil, cefuroxime axetil, cephalexin and locarbef for uncomplicated UTI. Note that some isolates may be susceptible to these agents while testing resistant to cefazolin.      -  Cefepime: R >16      -  Cefoxitin: S <=8      -  Ceftriaxone: R >32 Enterobacter, Citrobacter, and Serratia may develop resistance during prolonged therapy      -  Ciprofloxacin: R >2      -  Ertapenem: S <=1      -  Gentamicin: S <=4      -  Levofloxacin: R >4      -  Meropenem: S <=1      -  Nitrofurantoin: R >64 Should not be used to treat pyelonephritis      -  Piperacillin/Tazobactam: R <=16      -  Tobramycin: S <=4      -  Trimethoprim/Sulfamethoxazole: R >2/38      Method Type: MIGUEL A    Culture - Blood (collected 04-05-19 @ 10:34)  Source: .Blood Blood-Peripheral  Preliminary Report (04-06-19 @ 19:01):    No growth to date.            RADIOLOGY & ADDITIONAL TESTS:    ANTIBIOTICS:  ertapenem  IVPB

## 2019-04-09 NOTE — PROGRESS NOTE ADULT - ASSESSMENT
stable hospital day #4 from sepsis wit obstructed right ureter from stone now with PCN  he needs the PCN changed to NU and then once cleared by ID go for antegrade ureteroscopy / PNL preferably at [facility]    once access obtained ? d/c to home and readmit or keep until tuesday

## 2019-04-09 NOTE — DISCHARGE NOTE PROVIDER - PROVIDER TOKENS
PROVIDER:[TOKEN:[[id number]],FOLLOWUP:[2 weeks]],PROVIDER:[TOKEN:[[id number]],FOLLOWUP:[2 weeks]]

## 2019-04-10 LAB
CULTURE RESULTS: SIGNIFICANT CHANGE UP
SPECIMEN SOURCE: SIGNIFICANT CHANGE UP

## 2019-04-10 PROCEDURE — 50431 NJX PX NFROSGRM &/URTRGRM: CPT | Mod: 59,RT

## 2019-04-10 PROCEDURE — 50433 PLMT NEPHROURETERAL CATHETER: CPT | Mod: RT

## 2019-04-10 PROCEDURE — 99152 MOD SED SAME PHYS/QHP 5/>YRS: CPT

## 2019-04-10 PROCEDURE — 99231 SBSQ HOSP IP/OBS SF/LOW 25: CPT

## 2019-04-10 RX ORDER — MEROPENEM 1 G/30ML
500 INJECTION INTRAVENOUS ONCE
Qty: 0 | Refills: 0 | Status: COMPLETED | OUTPATIENT
Start: 2019-04-10 | End: 2019-04-10

## 2019-04-10 RX ORDER — CIPROFLOXACIN LACTATE 400MG/40ML
400 VIAL (ML) INTRAVENOUS ONCE
Qty: 0 | Refills: 0 | Status: COMPLETED | OUTPATIENT
Start: 2019-04-10 | End: 2019-04-10

## 2019-04-10 RX ORDER — MEROPENEM 1 G/30ML
500 INJECTION INTRAVENOUS EVERY 6 HOURS
Qty: 0 | Refills: 0 | Status: DISCONTINUED | OUTPATIENT
Start: 2019-04-10 | End: 2019-04-12

## 2019-04-10 RX ORDER — MEROPENEM 1 G/30ML
INJECTION INTRAVENOUS
Qty: 0 | Refills: 0 | Status: DISCONTINUED | OUTPATIENT
Start: 2019-04-10 | End: 2019-04-12

## 2019-04-10 RX ADMIN — Medication 200 MILLIGRAM(S): at 14:00

## 2019-04-10 RX ADMIN — Medication 200 MILLIGRAM(S): at 21:40

## 2019-04-10 RX ADMIN — Medication 1 DROP(S): at 06:08

## 2019-04-10 RX ADMIN — Medication 200 MILLIGRAM(S): at 23:12

## 2019-04-10 RX ADMIN — Medication 10 MILLIGRAM(S): at 06:08

## 2019-04-10 RX ADMIN — MEROPENEM 100 MILLIGRAM(S): 1 INJECTION INTRAVENOUS at 09:36

## 2019-04-10 RX ADMIN — Medication 1 TABLET(S): at 17:51

## 2019-04-10 RX ADMIN — MEROPENEM 100 MILLIGRAM(S): 1 INJECTION INTRAVENOUS at 17:51

## 2019-04-10 RX ADMIN — TAMSULOSIN HYDROCHLORIDE 0.4 MILLIGRAM(S): 0.4 CAPSULE ORAL at 21:40

## 2019-04-10 RX ADMIN — MEROPENEM 100 MILLIGRAM(S): 1 INJECTION INTRAVENOUS at 23:13

## 2019-04-10 RX ADMIN — ENOXAPARIN SODIUM 40 MILLIGRAM(S): 100 INJECTION SUBCUTANEOUS at 21:40

## 2019-04-10 RX ADMIN — Medication 1 DROP(S): at 23:14

## 2019-04-10 RX ADMIN — Medication 10 MILLIGRAM(S): at 16:28

## 2019-04-10 RX ADMIN — Medication 10 MILLIGRAM(S): at 21:41

## 2019-04-10 RX ADMIN — CHLORHEXIDINE GLUCONATE 1 APPLICATION(S): 213 SOLUTION TOPICAL at 06:08

## 2019-04-10 RX ADMIN — Medication 10 MILLIGRAM(S): at 21:40

## 2019-04-10 RX ADMIN — Medication 1 DROP(S): at 17:52

## 2019-04-10 RX ADMIN — Medication 1 TABLET(S): at 06:08

## 2019-04-10 RX ADMIN — Medication 200 MILLIGRAM(S): at 16:29

## 2019-04-10 RX ADMIN — Medication 200 MILLIGRAM(S): at 06:10

## 2019-04-10 NOTE — PROGRESS NOTE ADULT - SUBJECTIVE AND OBJECTIVE BOX
Vascular & Interventional Radiology Pre-Procedure Note    Procedure Name: SPT upsize and nephro exchange to nephroU    HPI: HPI:  62 yo M with PMH of cerebral palsy from group home, seizure disorder, spastic paraplegia, s/p PEG tube, BPH, bladder neck stricture s/p suprapubic cath, asthma, nephrolithiasis, chronic constipation presented to ED with low grade fevers and NBNB emesis x3. Also reports some abdominal pain. Collateral history obtained from aide at bedside (spends most of time with him at ). Patient is chronically constipated but does manage to have regular BM's with aide of laxatives. Denies chest pain or SOB.    In ED, CT abd/pel showed R hydroureteronephrosis w/ obstructing calculi. (05 Apr 2019 17:21)      Allergies:   Medications (Abx/Cardiac/Anticoagulation/Blood Products)    enoxaparin Injectable: 40 milliGRAM(s) SubCutaneous (04-09 @ 21:53)  ertapenem  IVPB: 120 mL/Hr IV Intermittent (04-09 @ 08:50)  meropenem  IVPB: 100 mL/Hr IV Intermittent (04-10 @ 09:36)  meropenem  IVPB: 100 mL/Hr IV Intermittent (04-09 @ 05:24)  tamsulosin: 0.4 milliGRAM(s) Oral (04-09 @ 21:53)    Data:    T(C): 35.6  HR: 63  BP: 96/50  RR: 17  SpO2: 95%    Exam  General: NAD, AAO x2, contracted, cerebral palsy   Chest: breathing comfortably on room air, CTAB, gurgling in throat  Abdomen: soft, non-tender, distended, PEG tube in place    Extremities: positive pulses bilaterally x4    Radiology & Additional Studies: Radiology imaging reviewed.     Labs:   -WBC 6.02 / HgB 12.2 / Hct 36.4 / Plt 171  -Na 144 / Cl 107 / BUN 17 / Glucose 108  -K 4.2 / CO2 26 / Cr 0.5  -ALT -- / Alk Phos -- / T.Bili --  -INR1.08      EKG completed (date): 4/5/2019    Height: 154.95cm  Weight: 53kG     Consentable: [ ] Yes   [ x ] No   consent from sister: Leslie Chaparro - [phone]    Plan:   -63y Male presents for SPT upsize and nephrostomy tube exchange to nephroureteral catheter for today 4/10  -Risks/Benefits/alternatives explained with the patient and/or healthcare proxy and witnessed informed consent obtained.

## 2019-04-10 NOTE — PROGRESS NOTE ADULT - ASSESSMENT
· Assessment		  62 yo M with PMH of cerebral palsy from group home, seizure disorder, spastic paraplegia, s/p PEG tube, BPH, bladder neck stricture s/p suprapubic cath, asthma, nephrolithiasis, chronic constipation presented to ED with low grade fevers and NBNB emesis x3. Also admits to pain over suprapubic cath site.      IMPRESSION:  Sepsis secondary to acute Pyelonephritis with R hydroureteronephrosis from obstructing calculi s/p R Nephrostomy tube by IR  Sepsis secondary to ESBL E coli  WBC 6.5    RECOMMENDATIONS:  Meropenem 500 mg iv q6h  Midline  On discharge Ertapenem 1 gm iv q24h for 14 days in all   Recall prn please

## 2019-04-10 NOTE — PROGRESS NOTE ADULT - SUBJECTIVE AND OBJECTIVE BOX
SUBJECTIVE:    Patient is a 63y old Male who presents with a chief complaint of pyelonephritis (10 Apr 2019 07:10)    Currently admitted to medicine with the primary diagnosis of Pyelonephritis     Today is hospital day 5d. This morning he is resting comfortably in bed and reports no new issues or overnight events.     PAST MEDICAL & SURGICAL HISTORY  Asthma  Urinary retention  Urinary calculi  Spastic quadriplegia  Osteoporosis  Seizure  Cerebral palsy  BPH (benign prostatic hyperplasia)  Suprapubic catheter  H/O cystoscopy  S/P percutaneous endoscopic gastrostomy (PEG) tube placement    SOCIAL HISTORY:  Negative for smoking/alcohol/drug use.     ALLERGIES:  No Known Allergies    MEDICATIONS:  STANDING MEDICATIONS  artificial  tears Solution 1 Drop(s) Both EYES four times a day  baclofen 10 milliGRAM(s) Oral three times a day  bisacodyl Suppository 10 milliGRAM(s) Rectal at bedtime  calcium carbonate   1250 mG (OsCal) 1 Tablet(s) Oral two times a day  chlorhexidine 4% Liquid 1 Application(s) Topical <User Schedule>  docusate sodium Liquid 60 milliGRAM(s) Oral two times a day  enoxaparin Injectable 40 milliGRAM(s) SubCutaneous every 24 hours  finasteride 5 milliGRAM(s) Oral daily  ibuprofen  Tablet. 200 milliGRAM(s) Oral three times a day  lactulose Syrup 30 Gram(s) Oral two times a day  loratadine 10 milliGRAM(s) Oral daily  meropenem  IVPB 500 milliGRAM(s) IV Intermittent once  meropenem  IVPB 500 milliGRAM(s) IV Intermittent every 6 hours  meropenem  IVPB      pantoprazole    Tablet 40 milliGRAM(s) Oral before breakfast  PHENobarbital 64.8 milliGRAM(s) Oral daily  polyethylene glycol 3350 17 Gram(s) Oral two times a day  tamsulosin 0.4 milliGRAM(s) Oral at bedtime    PRN MEDICATIONS  acetaminophen  Suppository .. 650 milliGRAM(s) Rectal every 6 hours PRN  ALBUTerol    90 MICROgram(s) HFA Inhaler 2 Puff(s) Inhalation every 6 hours PRN  magnesium hydroxide Suspension 30 milliLiter(s) Oral daily PRN    VITALS:   T(F): 96.1  HR: 63  BP: 96/50  RR: 17  SpO2: 94%    CAPILLARY BLOOD GLUCOSE      LABS:                        12.2   6.02  )-----------( 171      ( 09 Apr 2019 06:50 )             36.4     04-09    144  |  107  |  17  ----------------------------<  108<H>  4.2   |  26  |  0.5<L>    Ca    9.1      09 Apr 2019 06:50      PT/INR - ( 09 Apr 2019 06:50 )   PT: 12.40 sec;   INR: 1.08 ratio         PTT - ( 09 Apr 2019 06:50 )  PTT:30.0 sec              RADIOLOGY:    PHYSICAL EXAM:  GENERAL: NAD, contracted, cerebral palsy, able to answer his own questions, AAOx2  HEENT:  Atraumatic, Normocephalic. EOMI, PERRLA, very poor dental care, anicteric  PULMONARY: coarse breath sounds with gurgling in throat  CARDIOVASCULAR: Regular rate and rhythm; No murmurs  GASTROINTESTINAL: Distended, mildly tense but nontender, Bowel sounds present, PEG tube in place, Percutaneous nephrostomy noted (blood tinged urine in bag), mild R flank tenderness still, suprapubic alatorre in place  EXT:  no edema, contraction of b/l UE  NEUROLOGY: no new deficits   SKIN: No rashes

## 2019-04-10 NOTE — PROGRESS NOTE ADULT - ATTENDING COMMENTS
I personally saw and examined the patient, reviewed the chart and available data. I discussed the situation with the patients bedside caregive and the  staff.
Spoke with urology PA  - will schedule for SPC upsizing and exchange existing PCN for PCNU tomorrow, 4/9
Patient seen and examined independently. I personally had a face-to-face encounter with the patient, examined the patient myself and reviewed the plan of care with the housestaff. Agree with resident's note but my note supersedes that of the resident in the matters hereby listed.     For d/c to group home today after upgrade of his SPC by ESTEBAN
Patient was evaluated and examined by bedside, remains confused, had low grade fever, tolerating peg feedings  All labs, radiology studies, VS was reviewed  I agree with medical plan outlined by Medical resident as stated above.    #) Acute  Pyelonephritis with R hydrourteronephrosis from obstructing calculi  - no sepsis on admission  - CT abd/pel: moderate R hydrourteronephrosis, periureteral fat stranding, 6mm obstructing calc, additional other nonobstructive calc  - patient has history of Pseudomonas and ESBL Proteus in urine in past- placed on contact isolation.  - IV Meropenem  - f/u blood cxs- negative   + urine Cx- proteus ,ESBL , sensitive to IV ERtapenem, as per ID - to complete 2 weeks of iv abx., will place midline  - s/p R nephrostomy tube placement due to nephrocalcinosis  by IR - will need outpatient  - lithotripsy procedure    #) Seizure disorder - c/w Phenobarbital    #) Spastic paraplegia - c/w Baclofen, bed confinement status, decub. prevention tx.     #) BPH - c/w Flomax + Finasteride, h/o SPC- as per  - IR will exchange suprapubic cath in am - with larger catheter size    #) Chronic constipation - c/w Senna + Colace + Lactulose / Milk of Mag PRN  - CT abd/pel: no evidence of sigmoid volvulus, moderate fecal load, no pathological obstruction    #) h/o Peg placement- continue peg feedings as tolerated with residual monitoring    #) Asthma - stable, c/w Ventolin PRN .       #Progress Note Handoff: Pending Consults_________,Tests________,Test Results _____,Other; for exchange of SPC in am by IR  Family discussion: caregiver Disposition: group home
I personally saw and examined the patient, reviewed the chart and available data. I discussed the situation with the patient and [facility] staff. I also reviewed and/or amended the note as necessary.    I also discussed the case with Dr. Joyner morning of April 8 and we will see how soon we can get this done, possibly prior to discharge    Patient was seen and issues taken care of last night note is being signed this morning due to scheduling reasons
I saw , examined , evaluated patient ,and agree with PA findings /  plan    Consider soft dilation of SPC to larger size
Patient was evaluated and examined by bedside, remains confused, no fever, npo for SPC exchange by IR today  All labs, radiology studies, VS was reviewed  I agree with medical plan outlined by Medical resident as stated above.    #) Acute  Pyelonephritis with R hydrourteronephrosis from obstructing calculi  - no sepsis on admission  - CT abd/pel: moderate R hydrourteronephrosis, periureteral fat stranding, 6mm obstructing calc, additional other nonobstructive calc  - patient has history of Pseudomonas and ESBL Proteus in urine in past- placed on contact isolation.  - f/u blood cxs- negative   + urine Cx- proteus ,ESBL , sensitive to IV ERtapenem, as per ID - to complete 2 weeks of iv abx.,  placed midline  - s/p R nephrostomy tube placement due to nephrocalcinosis  by IR - will need outpatient  - lithotripsy procedure    #) Seizure disorder - c/w Phenobarbital    #) Spastic paraplegia - c/w Baclofen, bed confinement status, decub. prevention tx.     #) BPH - c/w Flomax + Finasteride, h/o SPC- as per  - IR will exchange suprapubic cath today - with larger catheter size    #) Chronic constipation - c/w Senna + Colace + Lactulose / Milk of Mag PRN  - CT abd/pel: no evidence of sigmoid volvulus, moderate fecal load, no pathological obstruction    #) h/o Peg placement- continue peg feedings as tolerated with residual monitoring    #) Asthma - stable, c/w Ventolin PRN .       #Progress Note Handoff: Pending Consults_________,Tests________,Test Results _____,Other; for exchange of SPC  by IR today  Family discussion: caregiver Disposition: d/c to  group home today  .

## 2019-04-10 NOTE — PROGRESS NOTE ADULT - SUBJECTIVE AND OBJECTIVE BOX
INTERVENTIONAL RADIOLOGY BRIEF-OPERATIVE NOTE    Procedure:  Percutaneous right nephrostomy with placement of right nephroureteral stent    Pre-Op Diagnosis:  Obstructive right uropathy    Post-Op Diagnosis:  Same    Attending:   Temo  Resident:   None    Antibiotic prophylaxis:  Cipro, 400mg ivpb    Anesthesia (type):  [ ] General Anesthesia  [X] Sedation-- Versed, 3mg and Fentanyl, 100mcg iv  [ ] Spinal Anesthesia  [ ] Local/Regional    Total face to face sedation time:  100 minutes    Contrast:  Optiray-320, 20cc to right renal collecting system and bladder    Estimated Blood Loss:  5cc    Condition:   [ ] Critical  [ ] Serious  [ ] Fair   [X] Good    Findings/Follow up Plan of Care:  Initial access from lower pole lost.  New access obtained from mid-pole calyx.  Guidewire traversal  beyond right ureteral stones achieved.  8-Fr 22cm nephroureteral stent placed uneventfully.  Draining serosanguinous urine to the bladder.  Cystostomy upsizing planned for tomorrow.  Patient tolerated well otherwise.     Specimens Removed:  None.    Implants:  None.    Complications:  None immediate.    Disposition:  back to floor.      Please call Interventional Radiology [phone] with any questions, concerns, or issues.

## 2019-04-10 NOTE — PROGRESS NOTE ADULT - SUBJECTIVE AND OBJECTIVE BOX
TISH SHAIKH  63y, Male      OVERNIGHT EVENTS:    no fever, responsive, feels well    VITALS:  T(F): 96.1, Max: 98.1 (04-09-19 @ 12:31)  HR: 63  BP: 96/50  RR: 17Vital Signs Last 24 Hrs  T(C): 35.6 (10 Apr 2019 06:08), Max: 36.7 (09 Apr 2019 12:31)  T(F): 96.1 (10 Apr 2019 06:08), Max: 98.1 (09 Apr 2019 12:31)  HR: 63 (10 Apr 2019 06:08) (63 - 74)  BP: 96/50 (10 Apr 2019 06:08) (96/50 - 135/61)  BP(mean): --  RR: 17 (10 Apr 2019 06:08) (17 - 18)  SpO2: 94% (09 Apr 2019 21:00) (94% - 98%)    TESTS & MEASUREMENTS:                        12.2   6.02  )-----------( 171      ( 09 Apr 2019 06:50 )             36.4     04-09    144  |  107  |  17  ----------------------------<  108<H>  4.2   |  26  |  0.5<L>    Ca    9.1      09 Apr 2019 06:50          Culture - Urine (collected 04-05-19 @ 20:00)  Source: .Urine None  Final Report (04-08-19 @ 09:44):    Moderate Proteus mirabilis ESBL  Organism: Proteus mirabilis ESBL (04-08-19 @ 09:44)  Organism: Proteus mirabilis ESBL (04-08-19 @ 09:44)      -  Amikacin: S <=16      -  Ampicillin: R >16 These ampicillin results predict results for amoxicillin      -  Ampicillin/Sulbactam: R <=8/4      -  Aztreonam: R 16      -  Cefazolin: R >16 For uncomplicated UTI with K. pneumoniae, E. coli, or P. mirablis: MIGUEL A <=16 is sensitive and MIGUEL A >=32 is resistant. This also predicts results for oral agents cefaclor, cefdinir, cefpodoxime, cefprozil, cefuroxime axetil, cephalexin and locarbef for uncomplicated UTI. Note that some isolates may be susceptible to these agents while testing resistant to cefazolin.      -  Cefepime: R >16      -  Cefoxitin: S <=8      -  Ceftriaxone: R >32 Enterobacter, Citrobacter, and Serratia may develop resistance during prolonged therapy      -  Ciprofloxacin: R >2      -  Ertapenem: S <=1      -  Gentamicin: S <=4      -  Levofloxacin: R >4      -  Meropenem: S <=1      -  Nitrofurantoin: R >64 Should not be used to treat pyelonephritis      -  Piperacillin/Tazobactam: R <=16      -  Tobramycin: S <=4      -  Trimethoprim/Sulfamethoxazole: R >2/38      Method Type: MIGUEL A    Culture - Blood (collected 04-05-19 @ 10:46)  Source: .Blood Blood-Peripheral  Preliminary Report (04-07-19 @ 01:01):    No growth to date.    Culture - Urine (collected 04-05-19 @ 10:34)  Source: .Urine Clean Catch (Midstream)  Final Report (04-07-19 @ 16:11):    >100,000 CFU/ml Proteus mirabilis ESBL  Organism: Proteus mirabilis ESBL (04-07-19 @ 16:11)  Organism: Proteus mirabilis ESBL (04-07-19 @ 16:11)      -  Amikacin: S <=16      -  Ampicillin: R >16 These ampicillin results predict results for amoxicillin      -  Ampicillin/Sulbactam: R <=8/4      -  Aztreonam: R >16      -  Cefazolin: R >16 For uncomplicated UTI with K. pneumoniae, E. coli, or P. mirablis: MIGUEL A <=16 is sensitive and MIGUEL A >=32 is resistant. This also predicts results for oral agents cefaclor, cefdinir, cefpodoxime, cefprozil, cefuroxime axetil, cephalexin and locarbef for uncomplicated UTI. Note that some isolates may be susceptible to these agents while testing resistant to cefazolin.      -  Cefepime: R >16      -  Cefoxitin: S <=8      -  Ceftriaxone: R >32 Enterobacter, Citrobacter, and Serratia may develop resistance during prolonged therapy      -  Ciprofloxacin: R >2      -  Ertapenem: S <=1      -  Gentamicin: S <=4      -  Levofloxacin: R >4      -  Meropenem: S <=1      -  Nitrofurantoin: R >64 Should not be used to treat pyelonephritis      -  Piperacillin/Tazobactam: R <=16      -  Tobramycin: S <=4      -  Trimethoprim/Sulfamethoxazole: R >2/38      Method Type: MIGUEL A    Culture - Blood (collected 04-05-19 @ 10:34)  Source: .Blood Blood-Peripheral  Preliminary Report (04-06-19 @ 19:01):    No growth to date.            RADIOLOGY & ADDITIONAL TESTS:    ANTIBIOTICS:  ertapenem  IVPB 1000 milliGRAM(s) IV Intermittent every 24 hours  ertapenem  IVPB

## 2019-04-10 NOTE — PROGRESS NOTE ADULT - ASSESSMENT
64 yo M with PMH of cerebral palsy from group home, seizure disorder, spastic paraplegia, s/p PEG tube, BPH, bladder neck stricture s/p suprapubic cath, asthma, nephrolithiasis, chronic constipation presented to ED with low grade fevers and NBNB emesis x3. Also admits to pain over suprapubic cath site.    #) Pyelonephritis with R hydrourteronephrosis from obstructing calculi s/p R Nephrostomy tube by IR  - CT abd/pel: moderate R hydrourteronephrosis, periureteral fat stranding, 6mm obstructing calc, additional other nonobstructive calc  - Pt no longer spiking fevers   - UCx: Proteus ESBL  - Pt switched to meropenem  - Blood culture negative to date  - Mid line in place for IV Abx for 2 weeks  - Pt going for resizing of SPC today by IR  - as per urology and IR: outpatient  f/u for laser lithotripsy for ureteral stone  - will a/c d/c tomorrow as pt will need to go to group home for IV abx (pt is from group home)    #) Chronic constipation - Resolved  - c/w Senna + Colace + Lactulose daily / Milk of Mag PRN  - CT abd/pel: no evidence of sigmoid volvulus, moderate fecal load, no pathological obstruction      #) Seizure disorder - c/w Phenobarbital    #) Spastic paraplegia - c/w Baclofen    #) BPH   - c/w Flomax + Finasteride    #) Asthma - stable, c/w Ventolin PRN    #MISC  - DVT ppx: Lovenox subQ  - Diet: feeds + meds through PEG  - Activity: OOBTC  - Code status: FULL  - Dispo: group home requiring IV Abx

## 2019-04-10 NOTE — PROGRESS NOTE ADULT - GUM GEN PE MLT EXAM PC
Metastases COMPARISON: CT scans of the abdomen and pelvis dated 10/7/2016. Findings: The bones are osteopenic, limiting evaluation. At least two osteolytic lucencies are seen within the proximal to mid femoral diaphysis. These measure up to 2.4 x 0.5 cm in size. No acute fracture or dislocation is identified. The joint spaces appear preserved. Excreted contrast media noted within the urinary bladder. Osteolytic lucencies within the proximal to mid femoral diaphysis suspicious for metastatic disease (versus multiple myeloma).        Medications     sodium chloride 100 mL/hr at 07/21/18 5458      sennosides-docusate sodium  1 tablet Oral BID    oxyCODONE  10 mg Oral TID    sodium chloride flush  10 mL Intravenous 2 times per day    simvastatin  20 mg Oral Nightly      DIET GENERAL;    ASSESSMENT/PLAN:   Humerus fracture   Hypertension   Hyperlipidemia   Chronic anticoagulation   h/o Jugula vein thrombosis   Thyroid cancer    Malignant neoplasm metastatic- brain and skeletal mets   Pain, neoplasm-related   Palliative care by specialist   DVT  prophylaxis      Electronically signed by Marcia Newell MD on 7/21/2018 at 9:03 AM Normal genitalia; no lesions; no discharge

## 2019-04-11 LAB
ANION GAP SERPL CALC-SCNC: 14 MMOL/L — SIGNIFICANT CHANGE UP (ref 7–14)
BUN SERPL-MCNC: 19 MG/DL — SIGNIFICANT CHANGE UP (ref 10–20)
CALCIUM SERPL-MCNC: 9.4 MG/DL — SIGNIFICANT CHANGE UP (ref 8.5–10.1)
CHLORIDE SERPL-SCNC: 105 MMOL/L — SIGNIFICANT CHANGE UP (ref 98–110)
CO2 SERPL-SCNC: 26 MMOL/L — SIGNIFICANT CHANGE UP (ref 17–32)
CREAT SERPL-MCNC: 0.5 MG/DL — LOW (ref 0.7–1.5)
CULTURE RESULTS: SIGNIFICANT CHANGE UP
GLUCOSE SERPL-MCNC: 116 MG/DL — HIGH (ref 70–99)
MAGNESIUM SERPL-MCNC: 2 MG/DL — SIGNIFICANT CHANGE UP (ref 1.8–2.4)
PHOSPHATE SERPL-MCNC: 2.8 MG/DL — SIGNIFICANT CHANGE UP (ref 2.1–4.9)
POTASSIUM SERPL-MCNC: 4 MMOL/L — SIGNIFICANT CHANGE UP (ref 3.5–5)
POTASSIUM SERPL-SCNC: 4 MMOL/L — SIGNIFICANT CHANGE UP (ref 3.5–5)
SODIUM SERPL-SCNC: 145 MMOL/L — SIGNIFICANT CHANGE UP (ref 135–146)
SPECIMEN SOURCE: SIGNIFICANT CHANGE UP

## 2019-04-11 PROCEDURE — 99152 MOD SED SAME PHYS/QHP 5/>YRS: CPT

## 2019-04-11 PROCEDURE — 75984 XRAY CONTROL CATHETER CHANGE: CPT | Mod: 26

## 2019-04-11 PROCEDURE — 51705 CHANGE OF BLADDER TUBE: CPT

## 2019-04-11 RX ADMIN — MEROPENEM 100 MILLIGRAM(S): 1 INJECTION INTRAVENOUS at 11:31

## 2019-04-11 RX ADMIN — ENOXAPARIN SODIUM 40 MILLIGRAM(S): 100 INJECTION SUBCUTANEOUS at 21:45

## 2019-04-11 RX ADMIN — Medication 64.8 MILLIGRAM(S): at 17:44

## 2019-04-11 RX ADMIN — LACTULOSE 30 GRAM(S): 10 SOLUTION ORAL at 05:47

## 2019-04-11 RX ADMIN — Medication 1 DROP(S): at 05:48

## 2019-04-11 RX ADMIN — Medication 1 DROP(S): at 23:41

## 2019-04-11 RX ADMIN — Medication 60 MILLIGRAM(S): at 05:46

## 2019-04-11 RX ADMIN — Medication 60 MILLIGRAM(S): at 17:37

## 2019-04-11 RX ADMIN — CHLORHEXIDINE GLUCONATE 1 APPLICATION(S): 213 SOLUTION TOPICAL at 05:47

## 2019-04-11 RX ADMIN — PANTOPRAZOLE SODIUM 40 MILLIGRAM(S): 20 TABLET, DELAYED RELEASE ORAL at 05:45

## 2019-04-11 RX ADMIN — MEROPENEM 100 MILLIGRAM(S): 1 INJECTION INTRAVENOUS at 17:40

## 2019-04-11 RX ADMIN — POLYETHYLENE GLYCOL 3350 17 GRAM(S): 17 POWDER, FOR SOLUTION ORAL at 17:39

## 2019-04-11 RX ADMIN — MEROPENEM 100 MILLIGRAM(S): 1 INJECTION INTRAVENOUS at 05:49

## 2019-04-11 RX ADMIN — Medication 1 TABLET(S): at 17:40

## 2019-04-11 RX ADMIN — Medication 200 MILLIGRAM(S): at 05:46

## 2019-04-11 RX ADMIN — Medication 10 MILLIGRAM(S): at 21:45

## 2019-04-11 RX ADMIN — MEROPENEM 100 MILLIGRAM(S): 1 INJECTION INTRAVENOUS at 23:40

## 2019-04-11 RX ADMIN — TAMSULOSIN HYDROCHLORIDE 0.4 MILLIGRAM(S): 0.4 CAPSULE ORAL at 21:45

## 2019-04-11 RX ADMIN — LACTULOSE 30 GRAM(S): 10 SOLUTION ORAL at 17:38

## 2019-04-11 RX ADMIN — POLYETHYLENE GLYCOL 3350 17 GRAM(S): 17 POWDER, FOR SOLUTION ORAL at 05:48

## 2019-04-11 RX ADMIN — Medication 1 DROP(S): at 17:40

## 2019-04-11 RX ADMIN — Medication 1 TABLET(S): at 05:45

## 2019-04-11 RX ADMIN — Medication 10 MILLIGRAM(S): at 05:45

## 2019-04-11 RX ADMIN — Medication 200 MILLIGRAM(S): at 06:15

## 2019-04-11 RX ADMIN — Medication 200 MILLIGRAM(S): at 21:45

## 2019-04-11 NOTE — PROGRESS NOTE ADULT - ASSESSMENT
64 yo M with PMH of cerebral palsy from group home, seizure disorder, spastic paraplegia, s/p PEG tube, BPH, bladder neck stricture s/p suprapubic cath, asthma, nephrolithiasis, chronic constipation presented to ED with low grade fevers and NBNB emesis x3. Also admits to pain over suprapubic cath site.    #) Pyelonephritis with R hydrourteronephrosis from obstructing calculi s/p R Nephrostomy tube by IR  - CT abd/pel: moderate R hydrourteronephrosis, periureteral fat stranding, 6mm obstructing calc, additional other nonobstructive calc  - Pt no longer spiking fevers   - UCx: Proteus ESBL  - Pt switched to meropenem  - Blood culture negative to date  - Mid line in place for IV Abx for 2 weeks    #Bleeding in Rt nephroureterostomy tube :   IR arsalan adjust PRN   repeat CBC in AM  SPC upgrade today.   D/c tomorrow     #) Chronic constipation - Resolved  - c/w Senna + Colace + Lactulose daily / Milk of Mag PRN  - CT abd/pel: no evidence of sigmoid volvulus, moderate fecal load, no pathological obstruction      #) Seizure disorder - c/w Phenobarbital    #) Spastic paraplegia - c/w Baclofen    #) BPH   - c/w Flomax + Finasteride    #) Asthma - stable, c/w Ventolin PRN    #MISC  - DVT ppx: Lovenox subQ  - Diet: feeds + meds through PEG  - Activity: OOBTC  - Code status: FULL  - Dispo: group home requiring IV Abx    #HANDOFF: D/C TOMORROW IF HGB STABLE

## 2019-04-11 NOTE — PROGRESS NOTE ADULT - SUBJECTIVE AND OBJECTIVE BOX
INTERVENTIONAL RADIOLOGY BRIEF-OPERATIVE NOTE    Procedure: Suprapubic upsizing with image guidance     Pre-Op Diagnosis: Malfunctioning suprapubic catheter    Post-Op Diagnosis: same     Attending: Levi  Resident: None    Anesthesia (type):  [ ] General Anesthesia  [x ] Sedation  [ ] Spinal Anesthesia  [ x] Local/Regional    Contrast: 10 cc    Estimated Blood Loss: Minimal, < 5 cc    Condition:   [ ] Critical  [ ] Serious  [ x] Fair   [ ] Good    Findings/Follow up Plan of Care:    18 Fr suprapubic catheter placed- ok to use immediately   no complication       Specimens Removed: none    Implants: none    Complications: none    Disposition: Recovery      Please call Interventional Radiology [phone] with any questions, concerns, or issues.

## 2019-04-11 NOTE — PROGRESS NOTE ADULT - SUBJECTIVE AND OBJECTIVE BOX
Vascular & Interventional Radiology Pre-Procedure Note    Procedure Name: Suprapubic catheter exchange and upsize      HPI: HPI:  64 yo M with PMH of cerebral palsy from group home, seizure disorder, spastic paraplegia, s/p PEG tube, BPH, bladder neck stricture s/p suprapubic cath, asthma, nephrolithiasis, chronic constipation presented to ED with low grade fevers and NBNB emesis x3. Also reports some abdominal pain. Collateral history obtained from aide at bedside (spends most of time with him at ). Patient is chronically constipated but does manage to have regular BM's with aide of laxatives. Denies chest pain or SOB.    In ED, CT abd/pel showed R hydroureteronephrosis w/ obstructing calculi. (05 Apr 2019 17:21)      Allergies:   Medications (Abx/Cardiac/Anticoagulation/Blood Products)    ciprofloxacin   IVPB: 200 mL/Hr IV Intermittent (04-10 @ 14:00)  enoxaparin Injectable: 40 milliGRAM(s) SubCutaneous (04-10 @ 21:40)  meropenem  IVPB: 100 mL/Hr IV Intermittent (04-10 @ 09:36)  meropenem  IVPB: 100 mL/Hr IV Intermittent (04-11 @ 11:31)  tamsulosin: 0.4 milliGRAM(s) Oral (04-10 @ 21:40)    Data:    T(C): 36.1  HR: 71  BP: 108/61  RR: 18  SpO2: 94%      Radiology & Additional Studies: Radiology imaging reviewed.     Labs:   -WBC 6.02 / HgB 12.2 / Hct 36.4 / Plt 171  -Na 144 / Cl 107 / BUN 17 / Glucose 108  -K 4.2 / CO2 26 / Cr 0.5  -ALT -- / Alk Phos -- / T.Bili --  -INR1.08      EKG completed (date): 4/5/19    Height/Weight: Daily     Daily     Consentable: [ ] Yes   [x ] No; consent obtain from sister/power of  Leslie Chaparro [phone]    Plan:   -63y Male presents for suprapubic catheter exchange and upsize   -Risks/Benefits/alternatives explained with the patient and/or healthcare proxy and witnessed informed consent obtained.

## 2019-04-11 NOTE — PROGRESS NOTE ADULT - SUBJECTIVE AND OBJECTIVE BOX
S : No new events  some bleeding noticed in the Rt Nephroureterostomy tube adjused yest      All other pertinent ROS negative.      04-10-19 @ 07:01  -  04-11-19 @ 07:00  --------------------------------------------------------  IN: 674 mL / OUT: 975 mL / NET: -301 mL      Vital Signs Last 24 Hrs  T(C): 36.1 (11 Apr 2019 12:41), Max: 36.5 (10 Apr 2019 21:00)  T(F): 97 (11 Apr 2019 12:41), Max: 97.7 (10 Apr 2019 21:00)  HR: 92 (11 Apr 2019 16:27) (64 - 92)  BP: 107/64 (11 Apr 2019 16:27) (106/63 - 128/60)  BP(mean): --  RR: 18 (11 Apr 2019 16:27) (18 - 20)  SpO2: 94% (11 Apr 2019 08:40) (94% - 94%)  PHYSICAL EXAM:    no change from prior   blood noted in rt Nephroureterostomy tube     MEDICATIONS:  MEDICATIONS  (STANDING):  artificial  tears Solution 1 Drop(s) Both EYES four times a day  baclofen 10 milliGRAM(s) Oral three times a day  bisacodyl Suppository 10 milliGRAM(s) Rectal at bedtime  calcium carbonate   1250 mG (OsCal) 1 Tablet(s) Oral two times a day  chlorhexidine 4% Liquid 1 Application(s) Topical <User Schedule>  docusate sodium Liquid 60 milliGRAM(s) Oral two times a day  enoxaparin Injectable 40 milliGRAM(s) SubCutaneous every 24 hours  finasteride 5 milliGRAM(s) Oral daily  ibuprofen  Tablet. 200 milliGRAM(s) Oral three times a day  lactulose Syrup 30 Gram(s) Oral two times a day  loratadine 10 milliGRAM(s) Oral daily  meropenem  IVPB 500 milliGRAM(s) IV Intermittent every 6 hours  meropenem  IVPB      pantoprazole    Tablet 40 milliGRAM(s) Oral before breakfast  PHENobarbital 64.8 milliGRAM(s) Oral daily  polyethylene glycol 3350 17 Gram(s) Oral two times a day  tamsulosin 0.4 milliGRAM(s) Oral at bedtime      LABS: All Labs Reviewed:                Blood Culture:     Radiology: reviewed

## 2019-04-12 VITALS
SYSTOLIC BLOOD PRESSURE: 92 MMHG | HEART RATE: 83 BPM | RESPIRATION RATE: 18 BRPM | TEMPERATURE: 97 F | DIASTOLIC BLOOD PRESSURE: 63 MMHG

## 2019-04-12 LAB
ANION GAP SERPL CALC-SCNC: 13 MMOL/L — SIGNIFICANT CHANGE UP (ref 7–14)
BASOPHILS # BLD AUTO: 0.02 K/UL — SIGNIFICANT CHANGE UP (ref 0–0.2)
BASOPHILS NFR BLD AUTO: 0.3 % — SIGNIFICANT CHANGE UP (ref 0–1)
BLD GP AB SCN SERPL QL: SIGNIFICANT CHANGE UP
BUN SERPL-MCNC: 22 MG/DL — HIGH (ref 10–20)
CALCIUM SERPL-MCNC: 8.9 MG/DL — SIGNIFICANT CHANGE UP (ref 8.5–10.1)
CHLORIDE SERPL-SCNC: 105 MMOL/L — SIGNIFICANT CHANGE UP (ref 98–110)
CO2 SERPL-SCNC: 26 MMOL/L — SIGNIFICANT CHANGE UP (ref 17–32)
CREAT SERPL-MCNC: 0.5 MG/DL — LOW (ref 0.7–1.5)
EOSINOPHIL # BLD AUTO: 0.42 K/UL — SIGNIFICANT CHANGE UP (ref 0–0.7)
EOSINOPHIL NFR BLD AUTO: 6.6 % — SIGNIFICANT CHANGE UP (ref 0–8)
GLUCOSE SERPL-MCNC: 144 MG/DL — HIGH (ref 70–99)
HCT VFR BLD CALC: 36.4 % — LOW (ref 42–52)
HGB BLD-MCNC: 12.2 G/DL — LOW (ref 14–18)
IMM GRANULOCYTES NFR BLD AUTO: 0.5 % — HIGH (ref 0.1–0.3)
LYMPHOCYTES # BLD AUTO: 1.16 K/UL — LOW (ref 1.2–3.4)
LYMPHOCYTES # BLD AUTO: 18.3 % — LOW (ref 20.5–51.1)
MCHC RBC-ENTMCNC: 31 PG — SIGNIFICANT CHANGE UP (ref 27–31)
MCHC RBC-ENTMCNC: 33.5 G/DL — SIGNIFICANT CHANGE UP (ref 32–37)
MCV RBC AUTO: 92.6 FL — SIGNIFICANT CHANGE UP (ref 80–94)
MONOCYTES # BLD AUTO: 0.5 K/UL — SIGNIFICANT CHANGE UP (ref 0.1–0.6)
MONOCYTES NFR BLD AUTO: 7.9 % — SIGNIFICANT CHANGE UP (ref 1.7–9.3)
NEUTROPHILS # BLD AUTO: 4.2 K/UL — SIGNIFICANT CHANGE UP (ref 1.4–6.5)
NEUTROPHILS NFR BLD AUTO: 66.4 % — SIGNIFICANT CHANGE UP (ref 42.2–75.2)
NRBC # BLD: 0 /100 WBCS — SIGNIFICANT CHANGE UP (ref 0–0)
PLATELET # BLD AUTO: 235 K/UL — SIGNIFICANT CHANGE UP (ref 130–400)
POTASSIUM SERPL-MCNC: 4.2 MMOL/L — SIGNIFICANT CHANGE UP (ref 3.5–5)
POTASSIUM SERPL-SCNC: 4.2 MMOL/L — SIGNIFICANT CHANGE UP (ref 3.5–5)
RBC # BLD: 3.93 M/UL — LOW (ref 4.7–6.1)
RBC # FLD: 12.7 % — SIGNIFICANT CHANGE UP (ref 11.5–14.5)
SODIUM SERPL-SCNC: 144 MMOL/L — SIGNIFICANT CHANGE UP (ref 135–146)
TYPE + AB SCN PNL BLD: SIGNIFICANT CHANGE UP
WBC # BLD: 6.33 K/UL — SIGNIFICANT CHANGE UP (ref 4.8–10.8)
WBC # FLD AUTO: 6.33 K/UL — SIGNIFICANT CHANGE UP (ref 4.8–10.8)

## 2019-04-12 PROCEDURE — 99231 SBSQ HOSP IP/OBS SF/LOW 25: CPT

## 2019-04-12 RX ADMIN — Medication 1 DROP(S): at 14:51

## 2019-04-12 RX ADMIN — Medication 200 MILLIGRAM(S): at 14:52

## 2019-04-12 RX ADMIN — LACTULOSE 30 GRAM(S): 10 SOLUTION ORAL at 05:22

## 2019-04-12 RX ADMIN — Medication 60 MILLIGRAM(S): at 05:23

## 2019-04-12 RX ADMIN — MEROPENEM 100 MILLIGRAM(S): 1 INJECTION INTRAVENOUS at 17:54

## 2019-04-12 RX ADMIN — PANTOPRAZOLE SODIUM 40 MILLIGRAM(S): 20 TABLET, DELAYED RELEASE ORAL at 05:23

## 2019-04-12 RX ADMIN — Medication 10 MILLIGRAM(S): at 05:24

## 2019-04-12 RX ADMIN — Medication 200 MILLIGRAM(S): at 18:08

## 2019-04-12 RX ADMIN — LACTULOSE 30 GRAM(S): 10 SOLUTION ORAL at 17:54

## 2019-04-12 RX ADMIN — Medication 10 MILLIGRAM(S): at 17:55

## 2019-04-12 RX ADMIN — Medication 1 DROP(S): at 17:56

## 2019-04-12 RX ADMIN — MEROPENEM 100 MILLIGRAM(S): 1 INJECTION INTRAVENOUS at 05:22

## 2019-04-12 RX ADMIN — Medication 64.8 MILLIGRAM(S): at 16:31

## 2019-04-12 RX ADMIN — LORATADINE 10 MILLIGRAM(S): 10 TABLET ORAL at 14:59

## 2019-04-12 RX ADMIN — Medication 1 TABLET(S): at 05:24

## 2019-04-12 RX ADMIN — POLYETHYLENE GLYCOL 3350 17 GRAM(S): 17 POWDER, FOR SOLUTION ORAL at 05:24

## 2019-04-12 RX ADMIN — Medication 200 MILLIGRAM(S): at 05:24

## 2019-04-12 RX ADMIN — MEROPENEM 100 MILLIGRAM(S): 1 INJECTION INTRAVENOUS at 14:58

## 2019-04-12 RX ADMIN — POLYETHYLENE GLYCOL 3350 17 GRAM(S): 17 POWDER, FOR SOLUTION ORAL at 17:57

## 2019-04-12 RX ADMIN — CHLORHEXIDINE GLUCONATE 1 APPLICATION(S): 213 SOLUTION TOPICAL at 05:25

## 2019-04-12 RX ADMIN — Medication 1 DROP(S): at 05:25

## 2019-04-12 RX ADMIN — FINASTERIDE 5 MILLIGRAM(S): 5 TABLET, FILM COATED ORAL at 14:59

## 2019-04-12 NOTE — CHART NOTE - NSCHARTNOTEFT_GEN_A_CORE
Registered Dietitian Follow-Up     Patient Profile Reviewed                           Yes [x]   No []     Nutrition History Previously Obtained        Yes [x]  No []       Pertinent Subjective Information: Tolerating feeds per RN.     Pertinent Medical Interventions:   X Pyelonephritis with R hydrourteronephrosis from obstructing calculi s/p R Nephrostomy tube by IR  X Bleeding in Rt nephroureterostomy tube; IR arsalan adjust PRN   X Chronic constipation - Resolved     Possible d/c 4/12    Diet order: Jevity 237ml q4h providing 1710kcal, 79gm protein, 1152ml h2o     Anthropometrics:  - Ht. 61"  - Wt. 117# no updated wts  - %wt change  - BMI 22  - #+/-10%     Pertinent Lab Data: (4/12) hgb 12.2, hct 36.4, BUN 22, Cr 0.5, s gluc 144     Pertinent Meds: lovenox, meropenem, dulcolax, protonix, miralax, lactulose, albuterol, oscal, colace, proscar     Physical Findings:  - Appearance: aaox2  - GI function: last documented BM 4/8 no GI distress  - Tubes: PEG feeds  - Oral/Mouth cavity: NPO  - Skin: R nephrostomy placement site, PEG, SPC     Nutrition Requirements  Weight Used: 117# per IA     Estimated Energy Needs    Continue [x]  Adjust [] 1430-1550kcal/d (MSJ x 1.2-1.3) compared to 1525kcal/d from home EN regimen   Adjusted Energy Recommendations:   kcal/day        Estimated Protein Needs    Continue [x]  Adjust [] 64-80gm/d (1.2-1.5gm/kg act wt - bedridden pt >60 years old w/high risk for pressure ulcers) compared to 81gm protein/d from home EN regimen  Adjusted Protein Recommendations:   gm/day        Estimated Fluid Needs        Continue [x]  Adjust [] 1ml/1kcal or as per LIP  Adjusted Fluid Recommendations:   mL/day     Nutrient Intake: TF at goal rate exceeds estimated needs- have previously discussed with LIP     [] Previous Nutrition Diagnosis: excessive enteral nutrition infusion            [x] Ongoing          [] Resolved    [] No active nutrition diagnosis identified at this time     Nutrition Intervention enteral nutrition   RECS: Please change TF order to Jevity 1.2- 240ml q 4 hrs ( hold 2am feeds = 5 feeds/day) and add Prosource TF q 24hrs for total of 1480kcal, 78gm protein, 960ml h20), additional fluid per LIP. Meets 100% estimated needs. Monitor biweekly wts and adjust TF PRN.    Goal/Expected Outcome: EN to provide > 85% but < 105% estimated needs in 4 days     Indicator/Monitoring: RD will monitor diet order, energy intake, labs, body composition, NFPF.    Pt continues at risk f/u 4 days.

## 2019-04-12 NOTE — PROGRESS NOTE ADULT - ASSESSMENT
As above       Plan:  Cont. SPT  Abx as per ID  Cont Flomax  Outpatient F/U for antegrade ureteroscopy on 4/23/19 at [facility].   Will need outpatient pre-op before procedure.    attending will F/U. As above       Plan:  Cont. SPT  Abx as per ID  Cont Flomax  Outpatient F/U for antegrade ureteroscopy on 4/23/19 at [facility].   Will need outpatient pre-op before procedure.   Maintain good genital hygiene       attending will F/U.

## 2019-04-12 NOTE — PROGRESS NOTE ADULT - REASON FOR ADMISSION
pyelonephritis

## 2019-04-12 NOTE — PROGRESS NOTE ADULT - SUBJECTIVE AND OBJECTIVE BOX
63 y.o M with PMH of bladder neck stricture, admitted to medicine with sepsis 2/2 Pyelonephritis with obstructed right ureteral stone s/p Right PCNT placement 4/5, that reversed to Nephro U 4/11, s/p Upsizing SPT to 18 Fr 4/11. Pt. seen and examined at bedside, SPT in place drain bloody tinged urine.  Afebrile.     Vital Signs Last 24 Hrs  T(C): 36.2 (12 Apr 2019 12:46), Max: 36.6 (12 Apr 2019 05:57)  T(F): 97.1 (12 Apr 2019 12:46), Max: 97.9 (12 Apr 2019 05:57)  HR: 83 (12 Apr 2019 12:46) (69 - 92)  BP: 92/63 (12 Apr 2019 12:46) (92/63 - 135/72)  RR: 18 (12 Apr 2019 12:46) (17 - 18)  SpO2: 100% (12 Apr 2019 08:09) (100% - 100%)      PE:  General: Awake and Alert in NAD  HEENT: NC/AT  Pulm: No accessory muscle use  Abd: soft, distended + PEG, bladder not palpable + SPT in place with dressing over, no active bleeding, draining bloody tinged urine.   : + phimosis, unable to retract glans + d/c over prepuce noted.       I&O's Detail    11 Apr 2019 07:01  -  12 Apr 2019 07:00  --------------------------------------------------------  IN:    Enteral Tube Flush: 400 mL    ns in tub fed  [id number]: 1185 mL  Total IN: 1585 mL    OUT:    Indwelling Catheter - Suprapubic: 200 mL    Nephrostomy Tube: 300 mL  Total OUT: 500 mL    Total NET: 1085 mL      12 Apr 2019 07:01  -  12 Apr 2019 13:12  --------------------------------------------------------  IN:  Total IN: 0 mL    OUT:    Indwelling Catheter - Suprapubic: 800 mL  Total OUT: 800 mL    Total NET: -800 mL      LABS:                        12.2   6.33  )-----------( 235      ( 12 Apr 2019 06:41 )             36.4     04-12    144  |  105  |  22<H>  ----------------------------<  144<H>  4.2   |  26  |  0.5<L>    Ca    8.9      12 Apr 2019 06:41  Phos  2.8     04-11  Mg     2.0     04-11

## 2019-04-17 ENCOUNTER — OUTPATIENT (OUTPATIENT)
Dept: OUTPATIENT SERVICES | Facility: HOSPITAL | Age: 64
LOS: 1 days | Discharge: HOME | End: 2019-04-17
Payer: MEDICARE

## 2019-04-17 VITALS
SYSTOLIC BLOOD PRESSURE: 105 MMHG | DIASTOLIC BLOOD PRESSURE: 57 MMHG | HEIGHT: 61 IN | TEMPERATURE: 99 F | RESPIRATION RATE: 18 BRPM | WEIGHT: 116.84 LBS | HEART RATE: 75 BPM | OXYGEN SATURATION: 97 %

## 2019-04-17 DIAGNOSIS — N20.2 CALCULUS OF KIDNEY WITH CALCULUS OF URETER: ICD-10-CM

## 2019-04-17 DIAGNOSIS — Z01.818 ENCOUNTER FOR OTHER PREPROCEDURAL EXAMINATION: ICD-10-CM

## 2019-04-17 DIAGNOSIS — Z98.890 OTHER SPECIFIED POSTPROCEDURAL STATES: Chronic | ICD-10-CM

## 2019-04-17 DIAGNOSIS — Z93.1 GASTROSTOMY STATUS: Chronic | ICD-10-CM

## 2019-04-17 DIAGNOSIS — Z93.59 OTHER CYSTOSTOMY STATUS: Chronic | ICD-10-CM

## 2019-04-17 PROCEDURE — 93010 ELECTROCARDIOGRAM REPORT: CPT

## 2019-04-17 RX ORDER — ERTAPENEM SODIUM 1 G/1
1 INJECTION, POWDER, LYOPHILIZED, FOR SOLUTION INTRAMUSCULAR; INTRAVENOUS
Qty: 0 | Refills: 0 | COMMUNITY

## 2019-04-17 RX ORDER — MAGNESIUM HYDROXIDE 400 MG/1
30 TABLET, CHEWABLE ORAL
Qty: 0 | Refills: 0 | COMMUNITY

## 2019-04-17 NOTE — H&P PST ADULT - NSICDXPASTMEDICALHX_GEN_ALL_CORE_FT
PAST MEDICAL HISTORY:  Asthma     BPH (benign prostatic hyperplasia)     Cerebral palsy     Osteoporosis     Seizure     Spastic quadriplegia     Urinary calculi     Urinary retention PAST MEDICAL HISTORY:  Asthma     BPH (benign prostatic hyperplasia)     Cerebral palsy     Osteoporosis     Seizure last seizure >10 years ago    Spastic quadriplegia     Urinary calculi     Urinary retention

## 2019-04-17 NOTE — H&P PST ADULT - ASSESSMENT
pt with scarred uerthra so we cant go retrograde access from above ao we will do antegrade attempt at removal

## 2019-04-17 NOTE — H&P PST ADULT - REASON FOR ADMISSION
62 Y/O M SCHEDULED FOR PAST FOR RIGHT ANTEGRADE URETEROSCOPY LASER LITHOTRIPSY, STONE BASKETING, POSSIBLE RIGHT NEPHROURETERAL STENT PLACEMENT 4/23/19

## 2019-04-17 NOTE — H&P PST ADULT - HISTORY OF PRESENT ILLNESS
CURRENTLY  DENIES ANY CP, SOB,PALPITATIONS,COUGH  WHEELCHAIR BOUND- HAS CP  RIGHT ARM PICC, SUPRAPUBIC CATH, PEG TUBE NOTED    AS PER PATIENT  this is his/her complete medical history including medications - PRESCRIPTIONS  OVER THE COUNTER MEDS

## 2019-04-18 DIAGNOSIS — N13.2 HYDRONEPHROSIS WITH RENAL AND URETERAL CALCULOUS OBSTRUCTION: ICD-10-CM

## 2019-04-18 DIAGNOSIS — G80.0 SPASTIC QUADRIPLEGIC CEREBRAL PALSY: ICD-10-CM

## 2019-04-18 DIAGNOSIS — N99.512 CYSTOSTOMY MALFUNCTION: ICD-10-CM

## 2019-04-18 DIAGNOSIS — J45.909 UNSPECIFIED ASTHMA, UNCOMPLICATED: ICD-10-CM

## 2019-04-18 DIAGNOSIS — G40.909 EPILEPSY, UNSPECIFIED, NOT INTRACTABLE, WITHOUT STATUS EPILEPTICUS: ICD-10-CM

## 2019-04-18 DIAGNOSIS — N13.6 PYONEPHROSIS: ICD-10-CM

## 2019-04-18 DIAGNOSIS — K59.09 OTHER CONSTIPATION: ICD-10-CM

## 2019-04-18 DIAGNOSIS — Z93.1 GASTROSTOMY STATUS: ICD-10-CM

## 2019-04-18 DIAGNOSIS — Z99.3 DEPENDENCE ON WHEELCHAIR: ICD-10-CM

## 2019-04-18 DIAGNOSIS — N40.0 BENIGN PROSTATIC HYPERPLASIA WITHOUT LOWER URINARY TRACT SYMPTOMS: ICD-10-CM

## 2019-04-18 DIAGNOSIS — T83.83XA HEMORRHAGE DUE TO GENITOURINARY PROSTHETIC DEVICES, IMPLANTS AND GRAFTS, INITIAL ENCOUNTER: ICD-10-CM

## 2019-04-19 ENCOUNTER — OUTPATIENT (OUTPATIENT)
Dept: OUTPATIENT SERVICES | Facility: HOSPITAL | Age: 64
LOS: 1 days | Discharge: HOME | End: 2019-04-19

## 2019-04-19 ENCOUNTER — APPOINTMENT (OUTPATIENT)
Dept: UROLOGY | Facility: CLINIC | Age: 64
End: 2019-04-19
Payer: MEDICARE

## 2019-04-19 DIAGNOSIS — R39.9 UNSPECIFIED SYMPTOMS AND SIGNS INVOLVING THE GENITOURINARY SYSTEM: ICD-10-CM

## 2019-04-19 DIAGNOSIS — Z98.890 OTHER SPECIFIED POSTPROCEDURAL STATES: Chronic | ICD-10-CM

## 2019-04-19 DIAGNOSIS — Z93.1 GASTROSTOMY STATUS: Chronic | ICD-10-CM

## 2019-04-19 DIAGNOSIS — Z93.59 OTHER CYSTOSTOMY STATUS: Chronic | ICD-10-CM

## 2019-04-19 PROBLEM — R56.9 UNSPECIFIED CONVULSIONS: Chronic | Status: ACTIVE | Noted: 2018-03-27

## 2019-04-19 PROCEDURE — 99212 OFFICE O/P EST SF 10 MIN: CPT

## 2019-04-22 PROBLEM — B49 FUNGUS PRESENT IN URINE: Status: ACTIVE | Noted: 2019-04-22

## 2019-04-22 NOTE — ASU PATIENT PROFILE, ADULT - PSH
H/O cystoscopy    S/P percutaneous endoscopic gastrostomy (PEG) tube placement    Suprapubic catheter

## 2019-04-22 NOTE — ASU PATIENT PROFILE, ADULT - PATIENT'S HEIGHT AND WEIGHT RECORDED IN THE VITAL SIGNS FLOWSHEET
yes Ears: no ear pain and no hearing problems.Nose: no nasal congestion and no nasal drainage.Mouth/Throat: no dysphagia, no hoarseness and no throat pain.Neck: no lumps, no pain, no stiffness and no swollen glands.

## 2019-04-22 NOTE — ASU PATIENT PROFILE, ADULT - PMH
Asthma    BPH (benign prostatic hyperplasia)    Cerebral palsy    Osteoporosis    Seizure  last seizure >10 years ago  Spastic quadriplegia    Urinary calculi    Urinary retention

## 2019-04-23 ENCOUNTER — APPOINTMENT (OUTPATIENT)
Dept: UROLOGY | Facility: HOSPITAL | Age: 64
End: 2019-04-23
Payer: MEDICARE

## 2019-04-23 ENCOUNTER — INPATIENT (INPATIENT)
Facility: HOSPITAL | Age: 64
LOS: 2 days | Discharge: NURSING, REHAB OR LONG-TERM CARE | End: 2019-04-26
Attending: UROLOGY | Admitting: UROLOGY
Payer: MEDICARE

## 2019-04-23 ENCOUNTER — LABORATORY RESULT (OUTPATIENT)
Age: 64
End: 2019-04-23

## 2019-04-23 VITALS
TEMPERATURE: 96 F | WEIGHT: 154.98 LBS | HEIGHT: 62 IN | RESPIRATION RATE: 17 BRPM | HEART RATE: 84 BPM | OXYGEN SATURATION: 94 % | DIASTOLIC BLOOD PRESSURE: 62 MMHG | SYSTOLIC BLOOD PRESSURE: 112 MMHG

## 2019-04-23 DIAGNOSIS — Z93.1 GASTROSTOMY STATUS: Chronic | ICD-10-CM

## 2019-04-23 DIAGNOSIS — Z93.59 OTHER CYSTOSTOMY STATUS: Chronic | ICD-10-CM

## 2019-04-23 DIAGNOSIS — B49 UNSPECIFIED MYCOSIS: ICD-10-CM

## 2019-04-23 DIAGNOSIS — Z98.890 OTHER SPECIFIED POSTPROCEDURAL STATES: Chronic | ICD-10-CM

## 2019-04-23 PROCEDURE — 74425 UROGRAPHY ANTEGRADE RS&I: CPT | Mod: 26,59

## 2019-04-23 PROCEDURE — 50387 CHANGE NEPHROURETERAL CATH: CPT | Mod: RT

## 2019-04-23 PROCEDURE — 50080 PERQ NL/PL LITHOTRP SMPL<2CM: CPT | Mod: RT

## 2019-04-23 PROCEDURE — 52352 CYSTOURETERO W/STONE REMOVE: CPT | Mod: 59,22,RT

## 2019-04-23 PROCEDURE — 50430 NJX PX NFROSGRM &/URTRGRM: CPT | Mod: 59,RT

## 2019-04-23 RX ORDER — MEROPENEM 1 G/30ML
500 INJECTION INTRAVENOUS EVERY 8 HOURS
Qty: 0 | Refills: 0 | Status: COMPLETED | OUTPATIENT
Start: 2019-04-23 | End: 2019-04-24

## 2019-04-23 RX ORDER — SODIUM CHLORIDE 9 MG/ML
1000 INJECTION, SOLUTION INTRAVENOUS
Qty: 0 | Refills: 0 | Status: DISCONTINUED | OUTPATIENT
Start: 2019-04-23 | End: 2019-04-23

## 2019-04-23 RX ORDER — ONDANSETRON 8 MG/1
4 TABLET, FILM COATED ORAL ONCE
Qty: 0 | Refills: 0 | Status: DISCONTINUED | OUTPATIENT
Start: 2019-04-23 | End: 2019-04-23

## 2019-04-23 RX ORDER — MICAFUNGIN SODIUM 100 MG/1
100 INJECTION, POWDER, LYOPHILIZED, FOR SOLUTION INTRAVENOUS ONCE
Qty: 0 | Refills: 0 | Status: DISCONTINUED | OUTPATIENT
Start: 2019-04-23 | End: 2019-04-26

## 2019-04-23 RX ORDER — TRAMADOL HYDROCHLORIDE 50 MG/1
50 TABLET ORAL EVERY 8 HOURS
Qty: 0 | Refills: 0 | Status: DISCONTINUED | OUTPATIENT
Start: 2019-04-23 | End: 2019-04-26

## 2019-04-23 RX ORDER — HEPARIN SODIUM 5000 [USP'U]/ML
5000 INJECTION INTRAVENOUS; SUBCUTANEOUS EVERY 12 HOURS
Qty: 0 | Refills: 0 | Status: DISCONTINUED | OUTPATIENT
Start: 2019-04-23 | End: 2019-04-26

## 2019-04-23 RX ORDER — MEROPENEM 1 G/30ML
500 INJECTION INTRAVENOUS ONCE
Qty: 0 | Refills: 0 | Status: DISCONTINUED | OUTPATIENT
Start: 2019-04-23 | End: 2019-04-23

## 2019-04-23 RX ORDER — CHLORHEXIDINE GLUCONATE 213 G/1000ML
1 SOLUTION TOPICAL
Qty: 0 | Refills: 0 | Status: DISCONTINUED | OUTPATIENT
Start: 2019-04-23 | End: 2019-04-26

## 2019-04-23 RX ORDER — PHENOBARBITAL 60 MG
64.8 TABLET ORAL DAILY
Qty: 0 | Refills: 0 | Status: DISCONTINUED | OUTPATIENT
Start: 2019-04-24 | End: 2019-04-26

## 2019-04-23 RX ORDER — HYDROMORPHONE HYDROCHLORIDE 2 MG/ML
0.5 INJECTION INTRAMUSCULAR; INTRAVENOUS; SUBCUTANEOUS
Qty: 0 | Refills: 0 | Status: DISCONTINUED | OUTPATIENT
Start: 2019-04-23 | End: 2019-04-23

## 2019-04-23 RX ORDER — OXYCODONE AND ACETAMINOPHEN 5; 325 MG/1; MG/1
1 TABLET ORAL ONCE
Qty: 0 | Refills: 0 | Status: DISCONTINUED | OUTPATIENT
Start: 2019-04-23 | End: 2019-04-23

## 2019-04-23 RX ORDER — TRAMADOL HYDROCHLORIDE 50 MG/1
50 TABLET ORAL EVERY 8 HOURS
Qty: 0 | Refills: 0 | Status: DISCONTINUED | OUTPATIENT
Start: 2019-04-23 | End: 2019-04-23

## 2019-04-23 RX ORDER — ALBUTEROL 90 UG/1
2 AEROSOL, METERED ORAL EVERY 6 HOURS
Qty: 0 | Refills: 0 | Status: DISCONTINUED | OUTPATIENT
Start: 2019-04-23 | End: 2019-04-26

## 2019-04-23 RX ORDER — BACLOFEN 100 %
10 POWDER (GRAM) MISCELLANEOUS EVERY 8 HOURS
Qty: 0 | Refills: 0 | Status: DISCONTINUED | OUTPATIENT
Start: 2019-04-23 | End: 2019-04-26

## 2019-04-23 RX ORDER — MORPHINE SULFATE 50 MG/1
2 CAPSULE, EXTENDED RELEASE ORAL
Qty: 0 | Refills: 0 | Status: DISCONTINUED | OUTPATIENT
Start: 2019-04-23 | End: 2019-04-23

## 2019-04-23 RX ORDER — SODIUM CHLORIDE 9 MG/ML
1000 INJECTION INTRAMUSCULAR; INTRAVENOUS; SUBCUTANEOUS
Qty: 0 | Refills: 0 | Status: DISCONTINUED | OUTPATIENT
Start: 2019-04-23 | End: 2019-04-26

## 2019-04-23 RX ORDER — MICAFUNGIN SODIUM 100 MG/1
100 INJECTION, POWDER, LYOPHILIZED, FOR SOLUTION INTRAVENOUS EVERY 24 HOURS
Qty: 0 | Refills: 0 | Status: COMPLETED | OUTPATIENT
Start: 2019-04-24 | End: 2019-04-24

## 2019-04-23 RX ORDER — LORATADINE 10 MG/1
10 TABLET ORAL DAILY
Qty: 0 | Refills: 0 | Status: DISCONTINUED | OUTPATIENT
Start: 2019-04-23 | End: 2019-04-23

## 2019-04-23 RX ADMIN — TRAMADOL HYDROCHLORIDE 50 MILLIGRAM(S): 50 TABLET ORAL at 21:05

## 2019-04-23 RX ADMIN — TRAMADOL HYDROCHLORIDE 50 MILLIGRAM(S): 50 TABLET ORAL at 19:56

## 2019-04-23 RX ADMIN — SODIUM CHLORIDE 75 MILLILITER(S): 9 INJECTION INTRAMUSCULAR; INTRAVENOUS; SUBCUTANEOUS at 19:56

## 2019-04-23 RX ADMIN — MORPHINE SULFATE 2 MILLIGRAM(S): 50 CAPSULE, EXTENDED RELEASE ORAL at 12:42

## 2019-04-23 RX ADMIN — HEPARIN SODIUM 5000 UNIT(S): 5000 INJECTION INTRAVENOUS; SUBCUTANEOUS at 18:01

## 2019-04-23 RX ADMIN — MORPHINE SULFATE 2 MILLIGRAM(S): 50 CAPSULE, EXTENDED RELEASE ORAL at 12:16

## 2019-04-23 RX ADMIN — Medication 1 DROP(S): at 23:25

## 2019-04-23 RX ADMIN — Medication 1 DROP(S): at 18:01

## 2019-04-23 RX ADMIN — SODIUM CHLORIDE 100 MILLILITER(S): 9 INJECTION, SOLUTION INTRAVENOUS at 11:00

## 2019-04-23 RX ADMIN — MORPHINE SULFATE 2 MILLIGRAM(S): 50 CAPSULE, EXTENDED RELEASE ORAL at 11:58

## 2019-04-23 RX ADMIN — MEROPENEM 100 MILLIGRAM(S): 1 INJECTION INTRAVENOUS at 21:07

## 2019-04-23 RX ADMIN — Medication 10 MILLIGRAM(S): at 21:07

## 2019-04-23 NOTE — BRIEF OPERATIVE NOTE - NSICDXBRIEFPROCEDURE_GEN_ALL_CORE_FT
PROCEDURES:  Ureteroscopy, flexible, with calculus removal 23-Apr-2019 10:38:44 right antegrade Bonnie Cooper  Nephrostolithotomy, percutaneous, with lithotripsy, small stone 23-Apr-2019 10:37:10  Bonnie Cooper  Nephrostogram, antegrade 23-Apr-2019 10:36:27  Bonnie Cooper  Change external ureteral stent 23-Apr-2019 10:35:56 right Bonnie Cooper

## 2019-04-23 NOTE — BRIEF OPERATIVE NOTE - OPERATION/FINDINGS
lot of bleeding in the kidney  antegrade ureteroscopy found a ureteral stone and with difficulty stone found in renal pelvis. Ureteral basketed and broke upon extraction / renal pieces sucked out  contrast study showed no extravasation

## 2019-04-23 NOTE — PRE-ANESTHESIA EVALUATION ADULT - NSANTHOSAYNRD_GEN_A_CORE
No. BARBIE screening performed.  STOP BANG Legend: 0-2 = LOW Risk; 3-4 = INTERMEDIATE Risk; 5-8 = HIGH Risk

## 2019-04-23 NOTE — BRIEF OPERATIVE NOTE - NSICDXBRIEFPREOP_GEN_ALL_CORE_FT
PRE-OP DIAGNOSIS:  Right ureteral calculus 23-Apr-2019 08:17:46  Bonnie Cooper  Renal calculus, right 23-Apr-2019 08:17:22  Bonnie Cooper

## 2019-04-23 NOTE — PRE-ANESTHESIA EVALUATION ADULT - NSPROPOSEDPROCEDFT_GEN_ALL_CORE
right antegrade ureteroscopy, laser lithotripsy, stone basketing, possible right nephroureteral stent placement

## 2019-04-23 NOTE — ASU DISCHARGE PLAN (ADULT/PEDIATRIC) - CARE PROVIDER_API CALL
Bonnie Cooper)  Urology  [address] [place] [address], [address]  [place] [address]  Phone: [phone]  Fax: [phone]  Follow Up Time:

## 2019-04-23 NOTE — BRIEF OPERATIVE NOTE - NSICDXBRIEFPOSTOP_GEN_ALL_CORE_FT
POST-OP DIAGNOSIS:  Right ureteral calculus 23-Apr-2019 08:28:31  Bonnie Cooper  Renal calculus, right 23-Apr-2019 08:28:00  Bonnie Cooper

## 2019-04-23 NOTE — ASU DISCHARGE PLAN (ADULT/PEDIATRIC) - CALL YOUR DOCTOR IF YOU HAVE ANY OF THE FOLLOWING:
Inability to tolerate liquids or foods/Pain not relieved by Medications/Fever greater than (need to indicate Fahrenheit or Celsius)/Bleeding that does not stop/Nausea and vomiting that does not stop
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
## 2019-04-23 NOTE — CHART NOTE - NSCHARTNOTEFT_GEN_A_CORE
Pt admitted for pain control. Per Dr. Cooper: meropenem 500 mg x 2 doses, micafungin 100 mg x 1 dose in am, tramadol for pain.  He will also need a Ct scan  tomorrow prior to discharge - Dr. Cooper to see pt in am. Pt admitted for pain control. Per Dr. Cooper: meropenem 500 mg x 2 doses, micafungin 100 mg x 1 dose in am, tramadol for pain.  He will also need a Ct scan  tomorrow prior to discharge - Dr. Cooper to see pt in am.    The situation was discussed with the PA real-time patient to be admitted

## 2019-04-24 LAB
ANION GAP SERPL CALC-SCNC: 10 MMOL/L — SIGNIFICANT CHANGE UP (ref 7–14)
BUN SERPL-MCNC: 11 MG/DL — SIGNIFICANT CHANGE UP (ref 10–20)
CALCIUM SERPL-MCNC: 8 MG/DL — LOW (ref 8.5–10.1)
CHLORIDE SERPL-SCNC: 107 MMOL/L — SIGNIFICANT CHANGE UP (ref 98–110)
CO2 SERPL-SCNC: 24 MMOL/L — SIGNIFICANT CHANGE UP (ref 17–32)
CREAT SERPL-MCNC: 0.5 MG/DL — LOW (ref 0.7–1.5)
GLUCOSE SERPL-MCNC: 103 MG/DL — HIGH (ref 70–99)
POTASSIUM SERPL-MCNC: 4 MMOL/L — SIGNIFICANT CHANGE UP (ref 3.5–5)
POTASSIUM SERPL-SCNC: 4 MMOL/L — SIGNIFICANT CHANGE UP (ref 3.5–5)
SODIUM SERPL-SCNC: 141 MMOL/L — SIGNIFICANT CHANGE UP (ref 135–146)

## 2019-04-24 PROCEDURE — 74176 CT ABD & PELVIS W/O CONTRAST: CPT | Mod: 26

## 2019-04-24 RX ORDER — MEROPENEM 1 G/30ML
1000 INJECTION INTRAVENOUS EVERY 8 HOURS
Qty: 0 | Refills: 0 | Status: DISCONTINUED | OUTPATIENT
Start: 2019-04-24 | End: 2019-04-26

## 2019-04-24 RX ORDER — MORPHINE SULFATE 50 MG/1
4 CAPSULE, EXTENDED RELEASE ORAL ONCE
Qty: 0 | Refills: 0 | Status: DISCONTINUED | OUTPATIENT
Start: 2019-04-24 | End: 2019-04-24

## 2019-04-24 RX ORDER — MICAFUNGIN SODIUM 20 MG/ML
100 INJECTION, POWDER, LYOPHILIZED, FOR SOLUTION INTRAVENOUS
Refills: 0 | Status: ACTIVE | COMMUNITY

## 2019-04-24 RX ORDER — ACETAMINOPHEN 500 MG
650 TABLET ORAL EVERY 6 HOURS
Qty: 0 | Refills: 0 | Status: DISCONTINUED | OUTPATIENT
Start: 2019-04-24 | End: 2019-04-26

## 2019-04-24 RX ADMIN — TRAMADOL HYDROCHLORIDE 50 MILLIGRAM(S): 50 TABLET ORAL at 22:07

## 2019-04-24 RX ADMIN — HEPARIN SODIUM 5000 UNIT(S): 5000 INJECTION INTRAVENOUS; SUBCUTANEOUS at 05:00

## 2019-04-24 RX ADMIN — MICAFUNGIN SODIUM 105 MILLIGRAM(S): 100 INJECTION, POWDER, LYOPHILIZED, FOR SOLUTION INTRAVENOUS at 05:55

## 2019-04-24 RX ADMIN — Medication 10 MILLIGRAM(S): at 13:01

## 2019-04-24 RX ADMIN — Medication 64.8 MILLIGRAM(S): at 11:06

## 2019-04-24 RX ADMIN — HEPARIN SODIUM 5000 UNIT(S): 5000 INJECTION INTRAVENOUS; SUBCUTANEOUS at 17:14

## 2019-04-24 RX ADMIN — Medication 10 MILLIGRAM(S): at 21:53

## 2019-04-24 RX ADMIN — Medication 1 DROP(S): at 17:14

## 2019-04-24 RX ADMIN — CHLORHEXIDINE GLUCONATE 1 APPLICATION(S): 213 SOLUTION TOPICAL at 05:24

## 2019-04-24 RX ADMIN — MORPHINE SULFATE 4 MILLIGRAM(S): 50 CAPSULE, EXTENDED RELEASE ORAL at 05:00

## 2019-04-24 RX ADMIN — TRAMADOL HYDROCHLORIDE 50 MILLIGRAM(S): 50 TABLET ORAL at 21:53

## 2019-04-24 RX ADMIN — Medication 1 DROP(S): at 23:11

## 2019-04-24 RX ADMIN — MEROPENEM 100 MILLIGRAM(S): 1 INJECTION INTRAVENOUS at 21:54

## 2019-04-24 RX ADMIN — MEROPENEM 100 MILLIGRAM(S): 1 INJECTION INTRAVENOUS at 05:01

## 2019-04-24 RX ADMIN — Medication 1 DROP(S): at 05:00

## 2019-04-24 RX ADMIN — Medication 650 MILLIGRAM(S): at 22:07

## 2019-04-24 RX ADMIN — Medication 10 MILLIGRAM(S): at 05:01

## 2019-04-24 RX ADMIN — TRAMADOL HYDROCHLORIDE 50 MILLIGRAM(S): 50 TABLET ORAL at 11:06

## 2019-04-24 RX ADMIN — Medication 650 MILLIGRAM(S): at 15:40

## 2019-04-24 RX ADMIN — Medication 1 DROP(S): at 11:03

## 2019-04-24 NOTE — PROGRESS NOTE ADULT - ASSESSMENT
63M s/p flexible Ureteroscopy with calculus removal, percutaneous nephrostolithotomy with lithotripsy, and exchange admitted post op for pain control    - still w/complaints of pain  - c/w Tramadol, received 1 dose 4mg MSO4  - s/p ABX as advised by attending  - Needs CT prior to discharge, will d/w attending and order 63M s/p Right antegrade flexible Ureteroscopy with calculus removal, percutaneous nephrostolithotomy with lithotripsy, and exchange of NU stent admitted post op for pain control    - still w/complaints of pain  - c/w Tramadol, received 1 dose 4mg MSO4  - s/p ABX as advised by attending  - Needs CT prior to discharge, will d/w attending and order    Due to Jenaro’s limited physical state I feel it is safer to admit him even though the pain does not appear to be that much more significant than most have after procedure of this magnitude. We watch them overnight and is stable try for a CT scan and then decide

## 2019-04-24 NOTE — CHART NOTE - NSCHARTNOTEFT_GEN_A_CORE
S/P Ureteroscopy, flexible, with calculus removal right antegrade,  Nephrostolithotomy, percutaneous, with lithotripsy, small stone, Nephrostogram, antegrade, Change external ureteral stent right side.  Pt NAD, c/o pain.  Pt is receiving toradol with minimal relief.    Pt has a patent SPT which is putting out straw colored to clear urine output, presenty 300+cc  Pt also has a Right flank nephrostomy tube with approx 200+cc of blood tinged output.  There is also a alatorre catheter providing tamponade to the right kidney/ureter.    Will give pt a dose of morphine 4mg once.

## 2019-04-25 LAB
ALBUMIN SERPL ELPH-MCNC: 3.2 G/DL — LOW (ref 3.5–5.2)
ALP SERPL-CCNC: 78 U/L — SIGNIFICANT CHANGE UP (ref 30–115)
ALT FLD-CCNC: 24 U/L — SIGNIFICANT CHANGE UP (ref 0–41)
ANION GAP SERPL CALC-SCNC: 9 MMOL/L — SIGNIFICANT CHANGE UP (ref 7–14)
AST SERPL-CCNC: 24 U/L — SIGNIFICANT CHANGE UP (ref 0–41)
BILIRUB SERPL-MCNC: 0.3 MG/DL — SIGNIFICANT CHANGE UP (ref 0.2–1.2)
BUN SERPL-MCNC: 11 MG/DL — SIGNIFICANT CHANGE UP (ref 10–20)
CALCIUM SERPL-MCNC: 8.2 MG/DL — LOW (ref 8.5–10.1)
CHLORIDE SERPL-SCNC: 106 MMOL/L — SIGNIFICANT CHANGE UP (ref 98–110)
CO2 SERPL-SCNC: 27 MMOL/L — SIGNIFICANT CHANGE UP (ref 17–32)
CREAT SERPL-MCNC: 0.5 MG/DL — LOW (ref 0.7–1.5)
GLUCOSE SERPL-MCNC: 132 MG/DL — HIGH (ref 70–99)
HCT VFR BLD CALC: 33.2 % — LOW (ref 42–52)
HGB BLD-MCNC: 11 G/DL — LOW (ref 14–18)
MCHC RBC-ENTMCNC: 31.3 PG — HIGH (ref 27–31)
MCHC RBC-ENTMCNC: 33.1 G/DL — SIGNIFICANT CHANGE UP (ref 32–37)
MCV RBC AUTO: 94.3 FL — HIGH (ref 80–94)
NRBC # BLD: 0 /100 WBCS — SIGNIFICANT CHANGE UP (ref 0–0)
PLATELET # BLD AUTO: 244 K/UL — SIGNIFICANT CHANGE UP (ref 130–400)
POTASSIUM SERPL-MCNC: 4 MMOL/L — SIGNIFICANT CHANGE UP (ref 3.5–5)
POTASSIUM SERPL-SCNC: 4 MMOL/L — SIGNIFICANT CHANGE UP (ref 3.5–5)
PROT SERPL-MCNC: 6 G/DL — SIGNIFICANT CHANGE UP (ref 6–8)
RBC # BLD: 3.52 M/UL — LOW (ref 4.7–6.1)
RBC # FLD: 13.6 % — SIGNIFICANT CHANGE UP (ref 11.5–14.5)
SODIUM SERPL-SCNC: 142 MMOL/L — SIGNIFICANT CHANGE UP (ref 135–146)
WBC # BLD: 4.92 K/UL — SIGNIFICANT CHANGE UP (ref 4.8–10.8)
WBC # FLD AUTO: 4.92 K/UL — SIGNIFICANT CHANGE UP (ref 4.8–10.8)

## 2019-04-25 PROCEDURE — 50389 REMOVE RENAL TUBE W/FLUORO: CPT | Mod: RT

## 2019-04-25 RX ADMIN — SODIUM CHLORIDE 75 MILLILITER(S): 9 INJECTION INTRAMUSCULAR; INTRAVENOUS; SUBCUTANEOUS at 18:23

## 2019-04-25 RX ADMIN — Medication 1 DROP(S): at 05:10

## 2019-04-25 RX ADMIN — TRAMADOL HYDROCHLORIDE 50 MILLIGRAM(S): 50 TABLET ORAL at 05:24

## 2019-04-25 RX ADMIN — Medication 64.8 MILLIGRAM(S): at 12:28

## 2019-04-25 RX ADMIN — CHLORHEXIDINE GLUCONATE 1 APPLICATION(S): 213 SOLUTION TOPICAL at 05:10

## 2019-04-25 RX ADMIN — MEROPENEM 100 MILLIGRAM(S): 1 INJECTION INTRAVENOUS at 21:48

## 2019-04-25 RX ADMIN — HEPARIN SODIUM 5000 UNIT(S): 5000 INJECTION INTRAVENOUS; SUBCUTANEOUS at 18:23

## 2019-04-25 RX ADMIN — MEROPENEM 100 MILLIGRAM(S): 1 INJECTION INTRAVENOUS at 05:09

## 2019-04-25 RX ADMIN — Medication 10 MILLIGRAM(S): at 21:48

## 2019-04-25 RX ADMIN — Medication 1 DROP(S): at 21:48

## 2019-04-25 RX ADMIN — Medication 1 DROP(S): at 19:53

## 2019-04-25 RX ADMIN — HEPARIN SODIUM 5000 UNIT(S): 5000 INJECTION INTRAVENOUS; SUBCUTANEOUS at 05:10

## 2019-04-25 RX ADMIN — Medication 10 MILLIGRAM(S): at 05:08

## 2019-04-25 RX ADMIN — TRAMADOL HYDROCHLORIDE 50 MILLIGRAM(S): 50 TABLET ORAL at 05:09

## 2019-04-25 RX ADMIN — Medication 1 DROP(S): at 11:17

## 2019-04-25 NOTE — PROGRESS NOTE ADULT - SUBJECTIVE AND OBJECTIVE BOX
Patient seen and examined at bedside.  Still with complaints of continued pain to right flank.  Afebrile overnight.  No other complaints    PAST MEDICAL & SURGICAL HISTORY:  Asthma  Urinary retention  Urinary calculi  Spastic quadriplegia  Osteoporosis  Seizure: last seizure &gt;10 years ago  Cerebral palsy  BPH (benign prostatic hyperplasia)  Suprapubic catheter  H/O cystoscopy  S/P percutaneous endoscopic gastrostomy (PEG) tube placement    MEDICATIONS  (STANDING):  artificial  tears Solution 1 Drop(s) Both EYES four times a day  baclofen 10 milliGRAM(s) Oral every 8 hours  chlorhexidine 4% Liquid 1 Application(s) Topical <User Schedule>  heparin  Injectable 5000 Unit(s) SubCutaneous every 12 hours  micafungin IVPB 100 milliGRAM(s) IV Intermittent once  PHENobarbital 64.8 milliGRAM(s) Oral daily  sodium chloride 0.9%. 1000 milliLiter(s) (75 mL/Hr) IV Continuous <Continuous>    MEDICATIONS  (PRN):  ALBUTerol    90 MICROgram(s) HFA Inhaler 2 Puff(s) Inhalation every 6 hours PRN Shortness of Breath and/or Wheezing  traMADol 50 milliGRAM(s) Oral every 8 hours PRN Moderate Pain (4 - 6)    Vital Signs Last 24 Hrs  T(C): 36.4 (24 Apr 2019 05:59), Max: 37.9 (23 Apr 2019 22:17)  T(F): 97.6 (24 Apr 2019 05:59), Max: 100.3 (23 Apr 2019 22:17)  HR: 108 (24 Apr 2019 05:59) (85 - 118)  BP: 113/82 (24 Apr 2019 05:59) (113/82 - 144/70)  BP(mean): --  RR: 18 (24 Apr 2019 05:59) (12 - 20)  SpO2: 98% (23 Apr 2019 11:27) (95% - 98%)    PHYSICAL EXAM:  GENERAL: NAD  CHEST/LUNG: Clear to auscultation bilaterally; No wheeze  HEART: Regular rate and rhythm; No murmurs, rubs, or gallops  ABDOMEN: Soft, Nontender, Nondistended; Bowel sounds present; SPT in place, right nephrostomy tube connected to bag  CNS: AAOx3    Pt has a patent SPT which is putting out straw colored to clear urine output  Pt has a Right flank nephrostomy tube with approx 200+cc of blood tinged output.    There is also a Edouard catheter providing tamponade to the right kidney/ureter.          04-24    141  |  107  |  11  ----------------------------<  103<H>  4.0   |  24  |  0.5<L>    Ca    8.0<L>      24 Apr 2019 06:10          Culture Results:   Moderate Proteus mirabilis ESBL (04-05 @ 20:00)  Culture Results:   No growth at 5 days. (04-05 @ 10:46)  Culture Results:   No growth at 5 days. (04-05 @ 10:34)  Culture Results:   >100,000 CFU/ml Proteus mirabilis ESBL (04-05 @ 10:34)
INTERVENTIONAL RADIOLOGY BRIEF-OPERATIVE NOTE    Procedure: right nephrostogram and removal of pcnu     Pre-Op Diagnosis: nephrolithiasis s/p pcnl    Post-Op Diagnosis: same     Attending: Levi  Resident: none    Anesthesia (type):  [ ] General Anesthesia  [ ] Sedation  [ ] Spinal Anesthesia  [ x] Local/Regional    Contrast: 10 mL     Estimated Blood Loss: Minimal, < 5 cc    Condition:   [ ] Critical  [ ] Serious  [ ] Fair   [ c] Good    Findings/Follow up Plan of Care:    see full report in pacs     Specimens Removed: none    Implants: none    Complications: none     Disposition: Recovery--> south       Please call Interventional Radiology [phone] with any questions, concerns, or issues.
Patient seen and examined at bedside.  Still with complaints of continued pain to right flank, unchanged from yesterday but stable.  Afebrile overnight.  No other complaints    PAST MEDICAL & SURGICAL HISTORY:  Asthma  Urinary retention  Urinary calculi  Spastic quadriplegia  Osteoporosis  Seizure: last seizure &gt;10 years ago  Cerebral palsy  BPH (benign prostatic hyperplasia)  Suprapubic catheter  H/O cystoscopy  S/P percutaneous endoscopic gastrostomy (PEG) tube placement    MEDICATIONS  (STANDING):  artificial  tears Solution 1 Drop(s) Both EYES four times a day  baclofen 10 milliGRAM(s) Oral every 8 hours  chlorhexidine 4% Liquid 1 Application(s) Topical <User Schedule>  heparin  Injectable 5000 Unit(s) SubCutaneous every 12 hours  meropenem  IVPB 1000 milliGRAM(s) IV Intermittent every 8 hours  micafungin IVPB 100 milliGRAM(s) IV Intermittent once  PHENobarbital 64.8 milliGRAM(s) Oral daily  sodium chloride 0.9%. 1000 milliLiter(s) (75 mL/Hr) IV Continuous <Continuous>    MEDICATIONS  (PRN):  acetaminophen    Suspension .. 650 milliGRAM(s) Oral every 6 hours PRN Temp greater or equal to 38C (100.4F)  ALBUTerol    90 MICROgram(s) HFA Inhaler 2 Puff(s) Inhalation every 6 hours PRN Shortness of Breath and/or Wheezing  traMADol 50 milliGRAM(s) Oral every 8 hours PRN Moderate Pain (4 - 6)      Vital Signs Last 24 Hrs  T(C): 37.3 (25 Apr 2019 05:09), Max: 38.1 (24 Apr 2019 14:07)  T(F): 99.2 (25 Apr 2019 05:09), Max: 100.6 (24 Apr 2019 14:07)  HR: 85 (25 Apr 2019 05:09) (83 - 95)  BP: 108/70 (25 Apr 2019 05:09) (108/70 - 120/56)  RR: 16 (25 Apr 2019 05:09) (16 - 16)  SpO2: 93% (25 Apr 2019 07:49) (93% - 93%)    PHYSICAL EXAM:  GENERAL: NAD  CHEST/LUNG: Clear to auscultation bilaterally; No wheeze  HEART: Regular rate and rhythm; No murmurs, rubs, or gallops  ABDOMEN: Soft, Nontender, Nondistended; Bowel sounds present; SPT in place, right nephrostomy tube connected to bag; tamponing alatorre in place  CNS: AAOx3    Pt has a patent SPT which is putting out straw colored to clear urine output  Pt has a Right flank nephrostomy tube with  of blood tinged urine output.    There is also a Alatorre catheter providing tamponade to the right kidney/ureter.                04-24    141  |  107  |  11  ----------------------------<  103<H>  4.0   |  24  |  0.5<L>    Ca    8.0<L>      24 Apr 2019 06:10      Culture Results:   Moderate Proteus mirabilis ESBL (04-05 @ 20:00)  Culture Results:   No growth at 5 days. (04-05 @ 10:46)  Culture Results:   No growth at 5 days. (04-05 @ 10:34)  Culture Results:   >100,000 CFU/ml Proteus mirabilis ESBL (04-05 @ 10:34)
Vascular & Interventional Radiology Pre-Procedure Note    Procedure Name: R nephrostogram with possible R PCN removal     HPI:  63M s/p Right antegrade flexible Ureteroscopy with calculus removal, percutaneous nephrostolithotomy with lithotripsy, and exchange of NU stent admitted post op for pain control.      Allergies:   Medications (Abx/Cardiac/Anticoagulation/Blood Products)  heparin  Injectable: 5000 Unit(s) SubCutaneous (04-25 @ 05:10)  meropenem  IVPB: 100 mL/Hr IV Intermittent (04-24 @ 05:01)  meropenem  IVPB: 100 mL/Hr IV Intermittent (04-25 @ 05:09)  micafungin IVPB: 105 mL/Hr IV Intermittent (04-24 @ 05:55)    Data:    T(C): 37.3  HR: 85  BP: 108/70  RR: 16  SpO2: 93%      Radiology & Additional Studies: Radiology imaging reviewed.     Labs:   -WBC 4.92 / HgB 11.0 / Hct 33.2 / Plt 244  -Na 142 / Cl 106 / BUN 11 / Glucose 132  -K 4.0 / CO2 27 / Cr 0.5  -ALT 24 / Alk Phos 78 / T.Bili 0.3      EKG completed (date): 4/17/19      Consentable: [ ] Yes   [x] No     Plan:   63M s/p ureteroscopy with calculus removal, percutaneous nephrostolithotomy with lithotripsy  - plan for R nephrostogram with possible PCN removal today  - Will obtain consent from SAMMY Chaparro  - NPO. Hold anticoagulation.               - Risks/Benefits/alternatives explained with the patient and/or healthcare proxy and witnessed informed consent obtained.

## 2019-04-25 NOTE — PROGRESS NOTE ADULT - ASSESSMENT
63M s/p Right antegrade flexible Ureteroscopy with calculus removal, percutaneous nephrostolithotomy with lithotripsy, and exchange of NU stent admitted post op for pain control    - attending spoke w/IR yesterday: Patient to go for CT stentogram at [place] today.  Will consider discharge after results reviewed by attending  - c/w Tramadol  - no fever reoccurence  - FU am labs  - attending to see patient at bedside

## 2019-04-25 NOTE — CHART NOTE - NSCHARTNOTEFT_GEN_A_CORE
Patient sent back from IR s/p removal of PNL tube.  Stoma bag over site, minimal amount of serosanguinous fluid noted.    d/w attending:  If no drainage in bag in am, remove bag, cover with band aide and discharge home with 2 week follow up.  If drainage in bag, keep bag on, discharge home and FU with Dr. Cooper on MONDAY.  Unable to discharge patient at this time as facility requires 24h notice.  RN will inform facility of intent to discharge tomorrow. Patient sent back from IR s/p removal of PNL tube.  Stoma bag over site, minimal amount of serosanguinous fluid noted.    d/w attending:  If no drainage in bag in am, remove bag, cover with band aide and discharge home with 2 week follow up.  If drainage in bag, keep bag on, discharge home and FU with Dr. Cooper on MONDAY.  Unable to discharge patient at this time as facility requires 24h notice.  RN will inform facility of intent to discharge tomorrow.  Intent order placed. Patient sent back from IR s/p removal of PNL tube.  Stoma bag over site, minimal amount of serosanguinous fluid noted.    d/w attending:  If no drainage in bag in am, remove bag, cover with band aide and discharge home with 2 week follow up.  If drainage in bag, keep bag on, discharge home and FU with Dr. Cooper on MONDAY.  Unable to discharge patient at this time as facility requires 24h notice.  RN will inform facility of intent to discharge tomorrow.  Intent order placed.    Issues as described above. I reviewed the films myself and agree with plan as stated

## 2019-04-25 NOTE — PROGRESS NOTE ADULT - ATTENDING COMMENTS
comfortable in bed.  awaiting transportation to [facility]
I personally saw and examined the patient, reviewed the chart and available data. I discussed the situation with the patient and his caregiver as well as PA staff. I also reviewed and/or amended the note as necessary.

## 2019-04-26 ENCOUNTER — TRANSCRIPTION ENCOUNTER (OUTPATIENT)
Age: 64
End: 2019-04-26

## 2019-04-26 VITALS
SYSTOLIC BLOOD PRESSURE: 118 MMHG | RESPIRATION RATE: 16 BRPM | HEART RATE: 88 BPM | TEMPERATURE: 99 F | DIASTOLIC BLOOD PRESSURE: 57 MMHG

## 2019-04-26 RX ORDER — FINASTERIDE 5 MG/1
1 TABLET, FILM COATED ORAL
Qty: 0 | Refills: 0 | COMMUNITY

## 2019-04-26 RX ORDER — POTASSIUM CITRATE MONOHYDRATE 100 %
1 POWDER (GRAM) MISCELLANEOUS
Qty: 0 | Refills: 0 | COMMUNITY

## 2019-04-26 RX ORDER — FEXOFENADINE HCL 30 MG
1 TABLET ORAL
Qty: 0 | Refills: 0 | COMMUNITY

## 2019-04-26 RX ORDER — ACETAMINOPHEN 500 MG
20.31 TABLET ORAL
Qty: 300 | Refills: 0
Start: 2019-04-26

## 2019-04-26 RX ORDER — ACETAMINOPHEN 500 MG
20.31 TABLET ORAL
Qty: 300 | Refills: 0 | OUTPATIENT
Start: 2019-04-26

## 2019-04-26 RX ORDER — TRAMADOL HYDROCHLORIDE 50 MG/1
1 TABLET ORAL
Qty: 0 | Refills: 0 | COMMUNITY
Start: 2019-04-26

## 2019-04-26 RX ORDER — TAMSULOSIN HYDROCHLORIDE 0.4 MG/1
1 CAPSULE ORAL
Qty: 0 | Refills: 0 | COMMUNITY

## 2019-04-26 RX ADMIN — Medication 10 MILLIGRAM(S): at 12:43

## 2019-04-26 RX ADMIN — MEROPENEM 100 MILLIGRAM(S): 1 INJECTION INTRAVENOUS at 05:25

## 2019-04-26 RX ADMIN — Medication 1 DROP(S): at 05:24

## 2019-04-26 RX ADMIN — HEPARIN SODIUM 5000 UNIT(S): 5000 INJECTION INTRAVENOUS; SUBCUTANEOUS at 05:24

## 2019-04-26 RX ADMIN — Medication 10 MILLIGRAM(S): at 05:24

## 2019-04-26 RX ADMIN — MEROPENEM 100 MILLIGRAM(S): 1 INJECTION INTRAVENOUS at 13:51

## 2019-04-26 RX ADMIN — Medication 1 DROP(S): at 12:42

## 2019-04-26 RX ADMIN — SODIUM CHLORIDE 75 MILLILITER(S): 9 INJECTION INTRAMUSCULAR; INTRAVENOUS; SUBCUTANEOUS at 05:25

## 2019-04-26 RX ADMIN — Medication 64.8 MILLIGRAM(S): at 12:42

## 2019-04-26 NOTE — DISCHARGE NOTE PROVIDER - CARE PROVIDERS DIRECT ADDRESSES
[email or web address]

## 2019-04-26 NOTE — DISCHARGE NOTE PROVIDER - NSDCCPCAREPLAN_GEN_ALL_CORE_FT
PRINCIPAL DISCHARGE DIAGNOSIS  Diagnosis: S/P ureteral stent placement  Assessment and Plan of Treatment: follow up with urology in 2 weeks, dsd daily to nephrostomy site, call doctor if severe pain, fever, vomiting, or blood in urine.      SECONDARY DISCHARGE DIAGNOSES  Diagnosis: Urine retention  Assessment and Plan of Treatment: continue with SPT to alatorre bag drainage

## 2019-04-26 NOTE — DISCHARGE NOTE NURSING/CASE MANAGEMENT/SOCIAL WORK - NSDCDPATPORTLINK_GEN_ALL_CORE
You can access the [facility] Patient Portal, offered by [facility], by registering with the following website: [email or web address]

## 2019-04-26 NOTE — DISCHARGE NOTE PROVIDER - HOSPITAL COURSE
Patient underwent following procedures via ambulatory surgery Nephrostolithotomy, percutaneous, with lithotripsy, small stone, Ureteroscopy, flexible, with calculus removal, Nephrostogram, antegrade, Change external ureteral stent    Patient was admitted and treated with abx and IVF. Patient underwent removal of nephrostomy by IR on 4/24. There has been minimal drainage from site. Patient underwent stentogram on 4/25. Patient clincally improved and so discharged back to group home. Patient underwent following procedures via ambulatory surgery Nephrostolithotomy, percutaneous, with lithotripsy, small stone, Ureteroscopy, flexible, with calculus removal, Nephrostogram, antegrade, Change external ureteral stent    Patient was admitted and treated with abx and IVF. Patient underwent removal of nephrostomy by IR on 4/24. There has been minimal drainage from site. Patient underwent stentogram on 4/25. Patient clincally improved and so discharged back to group home.         Pt seen and examined on day of discharge. Reviewed course with staff and aide at bedside. pt comfortable with no complaints.         Vital Signs Last 24 Hrs        T(F): 99.3 (26 Apr 2019 05:27), Max: 99.4 (25 Apr 2019 21:41)    HR: 73 (26 Apr 2019 05:27) (73 - 88)    BP: 121/55 (26 Apr 2019 05:27) (111/65 - 121/55)    RR: 16 (26 Apr 2019 05:27) (16 - 16)        PHYSICAL EXAM:            Constitutional: A&Ox4    Respiratory: cta b/l    Cardiovascular: s1 s2 rrr    Gastrointestinal: soft nt  nd + bs no rebound or guarding    Genitourinary: no cva tenderness, nephrostomy bag with no drainage in bag, minimal Recorded.    SPT draining clear urine 1200ml/24h    Extremities:  no edema, calf tenderness Patient underwent following procedures via ambulatory surgery Nephrostolithotomy, percutaneous, with lithotripsy, small stone, Ureteroscopy, flexible, with calculus removal, Nephrostogram, antegrade, Change external ureteral stent    Patient was admitted and treated with abx and IVF. Patient underwent removal of nephrostomy by IR on 4/24. There has been minimal drainage from site. Patient underwent stentogram on 4/25. Patient clincally improved and so discharged back to group home.         Pt seen and examined on day of discharge. Reviewed course with staff and aide at bedside. pt comfortable with no complaints.         Vital Signs Last 24 Hrs        T(F): 99.3 (26 Apr 2019 05:27), Max: 99.4 (25 Apr 2019 21:41)    HR: 73 (26 Apr 2019 05:27) (73 - 88)    BP: 121/55 (26 Apr 2019 05:27) (111/65 - 121/55)    RR: 16 (26 Apr 2019 05:27) (16 - 16)        PHYSICAL EXAM:            Constitutional: awake and in NAD    Respiratory: cta b/l    Cardiovascular: s1 s2 rrr    Gastrointestinal: soft nt  nd + bs no rebound or guarding    Genitourinary: no cva tenderness, nephrostomy bag with no drainage in bag, minimal Recorded.    SPT draining clear urine 1200ml/24h    Extremities:  no edema, calf tenderness

## 2019-04-26 NOTE — DISCHARGE NOTE PROVIDER - CARE PROVIDER_API CALL
Bonnie Cooper)  Urology  [address] [place] [address], [address]  [place] [address]  Phone: [phone]  Fax: [phone]  Follow Up Time:

## 2019-04-29 LAB — BACTERIA UR CULT: ABNORMAL

## 2019-04-30 NOTE — ADDENDUM
[FreeTextEntry1] : Patient discussed with PA/NP.  I agree with the above plan and have made any corrections accordingly.

## 2019-04-30 NOTE — ASSESSMENT
[FreeTextEntry1] : Patient here for urine culture, which was obtained from the right nephrostomy, without complications. Urine testing will be sent from the office the patient will followup in the OR on Tuesday for right antegrade laser lithotripsy.

## 2019-05-01 DIAGNOSIS — R31.0 GROSS HEMATURIA: ICD-10-CM

## 2019-05-01 DIAGNOSIS — R33.8 OTHER RETENTION OF URINE: ICD-10-CM

## 2019-05-01 DIAGNOSIS — N40.1 BENIGN PROSTATIC HYPERPLASIA WITH LOWER URINARY TRACT SYMPTOMS: ICD-10-CM

## 2019-05-01 DIAGNOSIS — K21.9 GASTRO-ESOPHAGEAL REFLUX DISEASE WITHOUT ESOPHAGITIS: ICD-10-CM

## 2019-05-01 DIAGNOSIS — M81.8 OTHER OSTEOPOROSIS WITHOUT CURRENT PATHOLOGICAL FRACTURE: ICD-10-CM

## 2019-05-01 DIAGNOSIS — N13.6 PYONEPHROSIS: ICD-10-CM

## 2019-05-01 DIAGNOSIS — H04.123 DRY EYE SYNDROME OF BILATERAL LACRIMAL GLANDS: ICD-10-CM

## 2019-05-01 DIAGNOSIS — G40.909 EPILEPSY, UNSPECIFIED, NOT INTRACTABLE, WITHOUT STATUS EPILEPTICUS: ICD-10-CM

## 2019-05-01 DIAGNOSIS — Z93.1 GASTROSTOMY STATUS: ICD-10-CM

## 2019-05-01 DIAGNOSIS — G80.0 SPASTIC QUADRIPLEGIC CEREBRAL PALSY: ICD-10-CM

## 2019-05-01 DIAGNOSIS — B96.4 PROTEUS (MIRABILIS) (MORGANII) AS THE CAUSE OF DISEASES CLASSIFIED ELSEWHERE: ICD-10-CM

## 2019-05-03 NOTE — ASU PREOP CHECKLIST - ALLERGY BAND ON
1/11/06 ASC-H pap.  Colpo 1/26/06 with area of VIKY II and negative ECC.  Referred for LEEP  LEEP 06/06--VIKY 2, margins clear--Pap Q 4 months x 3  10/4/06:Pap--NIL  3/15/07:Pap--NIL  9/7/07:Pap--NIL  4/22/08:Pap--NIL  12/18/09:Pap--NIL  11/22/10:Pap--NIL  9/20/12:Pap--NIL  1/10/14:Pap--NIL  4/21/16 NIL pap, Neg HPV  4/29/19 NIL pap, + HR HPV (not 16 or 18). Plan 1 year cotest (MRA) (juan diego)    DUPLICATE RFUE -- closing enc  
no known allergies

## 2019-05-06 ENCOUNTER — APPOINTMENT (OUTPATIENT)
Dept: UROLOGY | Facility: CLINIC | Age: 64
End: 2019-05-06
Payer: MEDICARE

## 2019-05-06 VITALS — SYSTOLIC BLOOD PRESSURE: 99 MMHG | DIASTOLIC BLOOD PRESSURE: 69 MMHG | HEART RATE: 65 BPM

## 2019-05-06 DIAGNOSIS — Z87.442 PERSONAL HISTORY OF URINARY CALCULI: ICD-10-CM

## 2019-05-06 PROCEDURE — 99024 POSTOP FOLLOW-UP VISIT: CPT

## 2019-05-06 NOTE — HISTORY OF PRESENT ILLNESS
[FreeTextEntry1] : Jenaro had a right percutaneous nephrolithotripsy done on April 23 and a follow-up study done on April 24 showed a 3 mm right upper pole nonobstructing stone. All the hardware was taken out and is here for review.\par \par According to him, (he has cerebral palsy but his communicative) and the escort he’s been doing well.\par  [Urinary Retention] : urinary retention

## 2019-05-06 NOTE — PHYSICAL EXAM
[Abdomen Soft] : soft [Abdomen Tenderness] : non-tender [Respiration, Rhythm And Depth] : normal respiratory rhythm and effort [] : no respiratory distress [Exaggerated Use Of Accessory Muscles For Inspiration] : no accessory muscle use [Oriented To Time, Place, And Person] : oriented to person, place, and time [Affect] : the affect was normal [Mood] : the mood was normal [Not Anxious] : not anxious [FreeTextEntry1] : in a wheelchair

## 2019-05-06 NOTE — ASSESSMENT
[FreeTextEntry1] : The exam shows the PCN site to be closed and has no CVA or abdominal tenderness and was left now is the suprapubic tube.\par \par Please note his pigtail catheter was changed to a Edouard catheter in April 10 he is not yet do for an upsize.\par \par We will arrange for him to see Dr. Timmons next week, he will upsize the tube and discuss with the patient the options which include opening up his urethra, creating a perineal urethrostomy, leaving him with the suprapubic tube, they all have risks and benefits and he will talk to them about.\par \par With respect to his stones is going to need a metabolic workup. I’m also recommending he see a nephrologist as the stones made him so sick we really don’t want to make new ones and he still has 1 to 3 mm stones left in his kidneys.\par \par

## 2019-05-06 NOTE — LETTER BODY
[Dear  ___] : Dear  [unfilled], [Courtesy Letter:] : I had the pleasure of seeing your patient, [unfilled], in my office today. [Sincerely,] : Sincerely, [Please see my note below.] : Please see my note below.

## 2019-05-06 NOTE — LETTER HEADER
[FreeTextEntry3] : Bonnie Cooper M.D.\par Director of Urology\par [facility]\par [address] [place] [address], [address]\[place] [address]

## 2019-05-13 ENCOUNTER — APPOINTMENT (OUTPATIENT)
Dept: UROLOGY | Facility: CLINIC | Age: 64
End: 2019-05-13
Payer: MEDICARE

## 2019-05-13 PROCEDURE — 99213 OFFICE O/P EST LOW 20 MIN: CPT | Mod: 24

## 2019-05-13 NOTE — ASSESSMENT
[FreeTextEntry1] : 64 yo with CP\par wheelchair bound\par \par - discussed the potential benefits of a perineal urethrostomy with Dr. Cooper and Ar\par I feel that makes the patient susceptible to ulceration from incontinence and direct urine exposure to the sacral region \par - I feel he is best managed by SPT \par - he will F/U in 1 month for SPT change

## 2019-05-13 NOTE — HISTORY OF PRESENT ILLNESS
[Urinary Retention] : urinary retention [FreeTextEntry1] : 64 yo with CP and urethral stricture\par \par the patient undergoes SPT exchange monthly\par \par here to discuss a perineal urethrostomy - presently has an SPT in place

## 2019-05-13 NOTE — LETTER BODY
[Consult Letter:] : I had the pleasure of evaluating your patient, [unfilled]. [Dear  ___] : Dear  [unfilled], [Consult Closing:] : Thank you very much for allowing me to participate in the care of this patient.  If you have any questions, please do not hesitate to contact me. [Please see my note below.] : Please see my note below. [Sincerely,] : Sincerely, [FreeTextEntry3] : Zaid Timmons MD, FACS\par

## 2019-05-28 ENCOUNTER — OUTPATIENT (OUTPATIENT)
Dept: OUTPATIENT SERVICES | Facility: HOSPITAL | Age: 64
LOS: 1 days | Discharge: HOME | End: 2019-05-28

## 2019-05-28 ENCOUNTER — APPOINTMENT (OUTPATIENT)
Dept: NEPHROLOGY | Facility: CLINIC | Age: 64
End: 2019-05-28

## 2019-05-28 VITALS — SYSTOLIC BLOOD PRESSURE: 118 MMHG | DIASTOLIC BLOOD PRESSURE: 80 MMHG | HEART RATE: 83 BPM

## 2019-05-28 DIAGNOSIS — Z93.59 OTHER CYSTOSTOMY STATUS: Chronic | ICD-10-CM

## 2019-05-28 DIAGNOSIS — Z93.1 GASTROSTOMY STATUS: Chronic | ICD-10-CM

## 2019-05-28 DIAGNOSIS — Z98.890 OTHER SPECIFIED POSTPROCEDURAL STATES: Chronic | ICD-10-CM

## 2019-05-28 NOTE — PHYSICAL EXAM
[General Appearance - Alert] : alert [General Appearance - In No Acute Distress] : in no acute distress [Heart Rate And Rhythm] : heart rate was normal and rhythm regular [Auscultation Breath Sounds / Voice Sounds] : lungs were clear to auscultation bilaterally [Heart Sounds] : normal S1 and S2 [Heart Sounds Gallop] : no gallops [Murmurs] : no murmurs [Heart Sounds Pericardial Friction Rub] : no pericardial rub [Bowel Sounds] : normal bowel sounds [Abdomen Soft] : soft [Abdomen Tenderness] : non-tender [No CVA Tenderness] : no ~M costovertebral angle tenderness [Abdomen Mass (___ Cm)] : no abdominal mass palpated [No Spinal Tenderness] : no spinal tenderness [Abnormal Walk] : normal gait [Nail Clubbing] : no clubbing  or cyanosis of the fingernails [Musculoskeletal - Swelling] : no joint swelling seen [Motor Tone] : muscle strength and tone were normal [Skin Color & Pigmentation] : normal skin color and pigmentation [Skin Turgor] : normal skin turgor [] : no rash [FreeTextEntry1] : Peg site clean. Supra pubic cather clean

## 2019-05-28 NOTE — ASSESSMENT
[FreeTextEntry1] : #Nephrolithiasis\par - as per Dr. Sánchez,a Left kidney w/ x2 lower pole kidney stons. Right Upper pole 3mm kidney stone\par - Stone analysis: 90% CaPO4 stones\par - will order nephrolithiasis workup ; 24 hr urine volume, Ca, Oxalate, Uric acid, and citrate\par - will order routine labs including PTh and PO4\par - will follow up in 2 months\par - target for U/O of 2L per day

## 2019-05-28 NOTE — HISTORY OF PRESENT ILLNESS
[FreeTextEntry1] : 64 y/o M w/ PMhx of CP (wheelchair bound), s/p PEG, s/p suprapubic catheter d/t urethral stricutres, and hx of nephrolithiasis. Pt was referred by Dr. Joyner for management of kidney stones. Pt is planned to have a perineal urethrostomy placement. Pt has no complaints at this time

## 2019-06-03 ENCOUNTER — EMERGENCY (EMERGENCY)
Facility: HOSPITAL | Age: 64
LOS: 0 days | Discharge: HOME | End: 2019-06-03
Admitting: INTERNAL MEDICINE

## 2019-06-03 ENCOUNTER — INPATIENT (INPATIENT)
Facility: HOSPITAL | Age: 64
LOS: 0 days | Discharge: NURSING, REHAB OR LONG-TERM CARE | End: 2019-06-04
Attending: INTERNAL MEDICINE | Admitting: INTERNAL MEDICINE
Payer: MEDICARE

## 2019-06-03 VITALS
TEMPERATURE: 97 F | RESPIRATION RATE: 20 BRPM | OXYGEN SATURATION: 93 % | HEART RATE: 88 BPM | SYSTOLIC BLOOD PRESSURE: 104 MMHG | DIASTOLIC BLOOD PRESSURE: 58 MMHG

## 2019-06-03 DIAGNOSIS — K94.23 GASTROSTOMY MALFUNCTION: ICD-10-CM

## 2019-06-03 DIAGNOSIS — J45.909 UNSPECIFIED ASTHMA, UNCOMPLICATED: ICD-10-CM
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
## 2019-06-03 DIAGNOSIS — G80.9 CEREBRAL PALSY, UNSPECIFIED: ICD-10-CM

## 2019-06-03 DIAGNOSIS — N40.0 BENIGN PROSTATIC HYPERPLASIA WITHOUT LOWER URINARY TRACT SYMPTOMS: ICD-10-CM

## 2019-06-03 DIAGNOSIS — Z93.1 GASTROSTOMY STATUS: Chronic | ICD-10-CM
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
## 2019-06-03 DIAGNOSIS — M81.0 AGE-RELATED OSTEOPOROSIS WITHOUT CURRENT PATHOLOGICAL FRACTURE: ICD-10-CM

## 2019-06-03 DIAGNOSIS — G40.909 EPILEPSY, UNSPECIFIED, NOT INTRACTABLE, WITHOUT STATUS EPILEPTICUS: ICD-10-CM

## 2019-06-03 DIAGNOSIS — Z98.890 OTHER SPECIFIED POSTPROCEDURAL STATES: Chronic | ICD-10-CM
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
## 2019-06-03 DIAGNOSIS — Z93.59 OTHER CYSTOSTOMY STATUS: Chronic | ICD-10-CM

## 2019-06-03 DIAGNOSIS — N32.0 BLADDER-NECK OBSTRUCTION: ICD-10-CM

## 2019-06-03 DIAGNOSIS — K59.09 OTHER CONSTIPATION: ICD-10-CM

## 2019-06-03 DIAGNOSIS — T83.011A BREAKDOWN (MECHANICAL) OF INDWELLING URETHRAL CATHETER, INITIAL ENCOUNTER: ICD-10-CM

## 2019-06-03 DIAGNOSIS — G80.0 SPASTIC QUADRIPLEGIC CEREBRAL PALSY: ICD-10-CM

## 2019-06-03 DIAGNOSIS — T18.8XXA FOREIGN BODY IN OTHER PARTS OF ALIMENTARY TRACT, INITIAL ENCOUNTER: ICD-10-CM

## 2019-06-03 LAB
ALBUMIN SERPL ELPH-MCNC: 3.7 G/DL — SIGNIFICANT CHANGE UP (ref 3.5–5.2)
ALP SERPL-CCNC: 97 U/L — SIGNIFICANT CHANGE UP (ref 30–115)
ALT FLD-CCNC: 14 U/L — SIGNIFICANT CHANGE UP (ref 0–41)
ANION GAP SERPL CALC-SCNC: 14 MMOL/L — SIGNIFICANT CHANGE UP (ref 7–14)
APPEARANCE UR: ABNORMAL
AST SERPL-CCNC: 23 U/L — SIGNIFICANT CHANGE UP (ref 0–41)
BACTERIA # UR AUTO: ABNORMAL /HPF
BASOPHILS # BLD AUTO: 0.03 K/UL — SIGNIFICANT CHANGE UP (ref 0–0.2)
BASOPHILS NFR BLD AUTO: 0.6 % — SIGNIFICANT CHANGE UP (ref 0–1)
BILIRUB SERPL-MCNC: 0.3 MG/DL — SIGNIFICANT CHANGE UP (ref 0.2–1.2)
BILIRUB UR-MCNC: NEGATIVE — SIGNIFICANT CHANGE UP
BUN SERPL-MCNC: 23 MG/DL — HIGH (ref 10–20)
CALCIUM SERPL-MCNC: 9.3 MG/DL — SIGNIFICANT CHANGE UP (ref 8.5–10.1)
CHLORIDE SERPL-SCNC: 103 MMOL/L — SIGNIFICANT CHANGE UP (ref 98–110)
CO2 SERPL-SCNC: 26 MMOL/L — SIGNIFICANT CHANGE UP (ref 17–32)
COLOR SPEC: ABNORMAL
CREAT SERPL-MCNC: 0.6 MG/DL — LOW (ref 0.7–1.5)
DIFF PNL FLD: ABNORMAL
EOSINOPHIL # BLD AUTO: 0.42 K/UL — SIGNIFICANT CHANGE UP (ref 0–0.7)
EOSINOPHIL NFR BLD AUTO: 8.4 % — HIGH (ref 0–8)
EPI CELLS # UR: ABNORMAL /HPF
GLUCOSE SERPL-MCNC: 97 MG/DL — SIGNIFICANT CHANGE UP (ref 70–99)
GLUCOSE UR QL: NEGATIVE MG/DL — SIGNIFICANT CHANGE UP
HCT VFR BLD CALC: 38 % — LOW (ref 42–52)
HGB BLD-MCNC: 12.9 G/DL — LOW (ref 14–18)
IMM GRANULOCYTES NFR BLD AUTO: 0.2 % — SIGNIFICANT CHANGE UP (ref 0.1–0.3)
KETONES UR-MCNC: NEGATIVE — SIGNIFICANT CHANGE UP
LEUKOCYTE ESTERASE UR-ACNC: ABNORMAL
LYMPHOCYTES # BLD AUTO: 0.93 K/UL — LOW (ref 1.2–3.4)
LYMPHOCYTES # BLD AUTO: 18.5 % — LOW (ref 20.5–51.1)
MCHC RBC-ENTMCNC: 31.5 PG — HIGH (ref 27–31)
MCHC RBC-ENTMCNC: 33.9 G/DL — SIGNIFICANT CHANGE UP (ref 32–37)
MCV RBC AUTO: 92.7 FL — SIGNIFICANT CHANGE UP (ref 80–94)
MONOCYTES # BLD AUTO: 0.5 K/UL — SIGNIFICANT CHANGE UP (ref 0.1–0.6)
MONOCYTES NFR BLD AUTO: 10 % — HIGH (ref 1.7–9.3)
NEUTROPHILS # BLD AUTO: 3.13 K/UL — SIGNIFICANT CHANGE UP (ref 1.4–6.5)
NEUTROPHILS NFR BLD AUTO: 62.3 % — SIGNIFICANT CHANGE UP (ref 42.2–75.2)
NITRITE UR-MCNC: POSITIVE
NRBC # BLD: 0 /100 WBCS — SIGNIFICANT CHANGE UP (ref 0–0)
PH UR: 8 — SIGNIFICANT CHANGE UP (ref 5–8)
PLATELET # BLD AUTO: 197 K/UL — SIGNIFICANT CHANGE UP (ref 130–400)
POTASSIUM SERPL-MCNC: 4.2 MMOL/L — SIGNIFICANT CHANGE UP (ref 3.5–5)
POTASSIUM SERPL-SCNC: 4.2 MMOL/L — SIGNIFICANT CHANGE UP (ref 3.5–5)
PROT SERPL-MCNC: 7 G/DL — SIGNIFICANT CHANGE UP (ref 6–8)
PROT UR-MCNC: 100 MG/DL
RBC # BLD: 4.1 M/UL — LOW (ref 4.7–6.1)
RBC # FLD: 13.4 % — SIGNIFICANT CHANGE UP (ref 11.5–14.5)
RBC CASTS # UR COMP ASSIST: >50 /HPF
SODIUM SERPL-SCNC: 143 MMOL/L — SIGNIFICANT CHANGE UP (ref 135–146)
SP GR SPEC: 1.01 — SIGNIFICANT CHANGE UP (ref 1.01–1.03)
UROBILINOGEN FLD QL: 1 MG/DL (ref 0.2–0.2)
WBC # BLD: 5.02 K/UL — SIGNIFICANT CHANGE UP (ref 4.8–10.8)
WBC # FLD AUTO: 5.02 K/UL — SIGNIFICANT CHANGE UP (ref 4.8–10.8)
WBC UR QL: ABNORMAL /HPF

## 2019-06-03 PROCEDURE — 99223 1ST HOSP IP/OBS HIGH 75: CPT

## 2019-06-03 PROCEDURE — 99285 EMERGENCY DEPT VISIT HI MDM: CPT | Mod: GC

## 2019-06-03 PROCEDURE — 51710 CHANGE OF BLADDER TUBE: CPT | Mod: 58

## 2019-06-03 PROCEDURE — 74018 RADEX ABDOMEN 1 VIEW: CPT | Mod: 26,77

## 2019-06-03 PROCEDURE — 74018 RADEX ABDOMEN 1 VIEW: CPT | Mod: 26

## 2019-06-03 RX ORDER — BACLOFEN 100 %
10 POWDER (GRAM) MISCELLANEOUS THREE TIMES A DAY
Refills: 0 | Status: DISCONTINUED | OUTPATIENT
Start: 2019-06-03 | End: 2019-06-04

## 2019-06-03 RX ORDER — IOHEXOL 300 MG/ML
30 INJECTION, SOLUTION INTRAVENOUS ONCE
Refills: 0 | Status: COMPLETED | OUTPATIENT
Start: 2019-06-03 | End: 2019-06-03

## 2019-06-03 RX ORDER — CHLORHEXIDINE GLUCONATE 213 G/1000ML
1 SOLUTION TOPICAL
Refills: 0 | Status: DISCONTINUED | OUTPATIENT
Start: 2019-06-03 | End: 2019-06-04

## 2019-06-03 RX ORDER — POLYETHYLENE GLYCOL 3350 17 G/17G
17 POWDER, FOR SOLUTION ORAL DAILY
Refills: 0 | Status: DISCONTINUED | OUTPATIENT
Start: 2019-06-03 | End: 2019-06-04

## 2019-06-03 RX ORDER — FEXOFENADINE HCL 30 MG
1 TABLET ORAL
Qty: 0 | Refills: 0 | DISCHARGE

## 2019-06-03 RX ORDER — ENOXAPARIN SODIUM 100 MG/ML
40 INJECTION SUBCUTANEOUS DAILY
Refills: 0 | Status: DISCONTINUED | OUTPATIENT
Start: 2019-06-03 | End: 2019-06-04

## 2019-06-03 RX ORDER — ALBUTEROL 90 UG/1
2.5 AEROSOL, METERED ORAL ONCE
Refills: 0 | Status: DISCONTINUED | OUTPATIENT
Start: 2019-06-03 | End: 2019-06-04

## 2019-06-03 RX ORDER — DOCUSATE SODIUM 100 MG
120 CAPSULE ORAL
Refills: 0 | Status: DISCONTINUED | OUTPATIENT
Start: 2019-06-03 | End: 2019-06-04

## 2019-06-03 RX ORDER — LACTULOSE 10 G/15ML
10 SOLUTION ORAL THREE TIMES A DAY
Refills: 0 | Status: DISCONTINUED | OUTPATIENT
Start: 2019-06-03 | End: 2019-06-04

## 2019-06-03 RX ORDER — CALCIUM CARBONATE 500(1250)
1 TABLET ORAL DAILY
Refills: 0 | Status: DISCONTINUED | OUTPATIENT
Start: 2019-06-03 | End: 2019-06-04

## 2019-06-03 RX ORDER — POTASSIUM CITRATE MONOHYDRATE 100 %
1 POWDER (GRAM) MISCELLANEOUS
Qty: 0 | Refills: 0 | DISCHARGE

## 2019-06-03 RX ORDER — PHENOBARBITAL 60 MG
1 TABLET ORAL
Qty: 0 | Refills: 0 | DISCHARGE

## 2019-06-03 RX ORDER — ALBUTEROL 90 UG/1
2 AEROSOL, METERED ORAL EVERY 6 HOURS
Refills: 0 | Status: DISCONTINUED | OUTPATIENT
Start: 2019-06-03 | End: 2019-06-04

## 2019-06-03 RX ORDER — ACETAMINOPHEN 500 MG
650 TABLET ORAL EVERY 6 HOURS
Refills: 0 | Status: DISCONTINUED | OUTPATIENT
Start: 2019-06-03 | End: 2019-06-04

## 2019-06-03 RX ADMIN — Medication 1 DROP(S): at 18:23

## 2019-06-03 RX ADMIN — IOHEXOL 30 MILLILITER(S): 300 INJECTION, SOLUTION INTRAVENOUS at 11:57

## 2019-06-03 NOTE — ED PROVIDER NOTE - CARE PLAN
Principal Discharge DX:	PEG tube malfunction Principal Discharge DX:	PEG tube malfunction  Secondary Diagnosis:	Suprapubic catheter dysfunction

## 2019-06-03 NOTE — H&P ADULT - NSHPLABSRESULTS_GEN_ALL_CORE
12.9   5.02  )-----------( 197      ( 03 Jun 2019 10:57 )             38.0     06-03    143  |  103  |  23<H>  ----------------------------<  97  4.2   |  26  |  0.6<L>    Ca    9.3      03 Jun 2019 10:57    TPro  7.0  /  Alb  3.7  /  TBili  0.3  /  DBili  x   /  AST  23  /  ALT  14  /  AlkPhos  97  06-03

## 2019-06-03 NOTE — ED PROVIDER NOTE - PROGRESS NOTE DETAILS
attempted placing new G tube but unable to deflate balloon will admit for replacement. Bedside ultrasound showed no urinary retention. Will get labs and assess kidney function and ua. Attempted flushing alatorre catheter without success. Spoke with urology who will come attempt suprapubic change.

## 2019-06-03 NOTE — CONSULT NOTE ADULT - SUBJECTIVE AND OBJECTIVE BOX
Chief Complaint:  Patient is a 63y old  Male who presents with a chief complaint of PEG tube malfunction (2019 13:26)      HPI:  64 yo M with PMH of cerebral palsy from group home, seizure disorder, spastic paraplegia, s/p PEG tube, BPH, bladder neck stricture s/p suprapubic cath, asthma, nephrolithiasis, chronic constipation was sent in from sn[facility] for malfunction PEG tube. Tube cap was broken and contents were leaking. Collateral history obtained from aide at bedside (spends most of time with him at ). Patient or aide denied any complaints or symptoms - no abdominal pain, nausea, vomiting, fevers, chills, chest pain, SOB, or other issues.    Patient also had clogged suprapubic catheter. Urology evaluated patient in ED. Attempted irrigation but were unsuccessful, so changed out tube.    Allergies:  No Known Allergies    Hospital Medications:  acetaminophen    Suspension .. 650 milliGRAM(s) Oral every 6 hours PRN  ALBUTerol    0.083%. 2.5 milliGRAM(s) Nebulizer once PRN  ALBUTerol    90 MICROgram(s) HFA Inhaler 2 Puff(s) Inhalation every 6 hours PRN  artificial tears (preservative free) Ophthalmic Solution 1 Drop(s) Both EYES two times a day  baclofen 10 milliGRAM(s) Oral three times a day  calcium carbonate   1250 mG (OsCal) 1 Tablet(s) Oral daily  chlorhexidine 4% Liquid 1 Application(s) Topical <User Schedule>  docusate sodium Liquid 120 milliGRAM(s) Oral two times a day  enoxaparin Injectable 40 milliGRAM(s) SubCutaneous daily  lactulose Syrup 10 Gram(s) Oral three times a day PRN  polyethylene glycol 3350 17 Gram(s) Oral daily      PMHX/PSHX:  Asthma  Urinary retention  Urinary calculi  Spastic quadriplegia  Osteoporosis  Seizure  Cerebral palsy  BPH (benign prostatic hyperplasia)  Suprapubic catheter  History of suprapubic catheter  H/O cystoscopy  S/P percutaneous endoscopic gastrostomy (PEG) tube placement      Family history:  Family history unknown  No pertinent family history in first degree relatives    ROS:   difficult to obtain due to intellectual delay.      PHYSICAL EXAM:   Vital Signs:  Vital Signs Last 24 Hrs  T(C): 36.6 (2019 16:10), Max: 36.6 (2019 13:06)  T(F): 97.8 (2019 16:10), Max: 97.9 (2019 13:06)  HR: 73 (2019 16:10) (73 - 91)  BP: 94/50 (2019 16:10) (94/50 - 104/58)  BP(mean): --  RR: 18 (2019 16:10) (18 - 20)  SpO2: 94% (2019 13:06) (93% - 94%)  Daily     Daily     GENERAL:  Appears stated age, well-groomed, well-nourished, no distress  HEENT:  NC/AT,  conjunctivae clear and pink, no thyromegaly, nodules, adenopathy, no JVD, sclera -anicteric  CHEST:  Full & symmetric excursion, no increased effort, breath sounds clear  HEART:  Regular rhythm, S1, S2, no murmur/rub/S3/S4, no abdominal bruit, no edema  ABDOMEN:  Soft, non-tender, non-distended, normoactive bowel sounds,  no masses ,no hepato-splenomegaly, PEG in place with no surrounding erythema or leakage.  SKIN:  No rash/erythema/ecchymoses/petechiae/wounds/abscess/warm/dry  NEURO:  Alert, oriented, no asterixis, no tremor, no encephalopathy    LABS:                        12.9   5.02  )-----------( 197      ( 2019 10:57 )             38.0     06-03    143  |  103  |  23<H>  ----------------------------<  97  4.2   |  26  |  0.6<L>    Ca    9.3      2019 10:57    TPro  7.0  /  Alb  3.7  /  TBili  0.3  /  DBili  x   /  AST  23  /  ALT  14  /  AlkPhos  97  06-03    LIVER FUNCTIONS - ( 2019 10:57 )  Alb: 3.7 g/dL / Pro: 7.0 g/dL / ALK PHOS: 97 U/L / ALT: 14 U/L / AST: 23 U/L / GGT: x             Urinalysis Basic - ( 2019 13:40 )    Color: Red / Appearance: Turbid / S.015 / pH: x  Gluc: x / Ketone: Negative  / Bili: Negative / Urobili: 1.0 mg/dL   Blood: x / Protein: 100 mg/dL / Nitrite: Positive   Leuk Esterase: Large / RBC: >50 /HPF / WBC 6-10 /HPF   Sq Epi: x / Non Sq Epi: Occasional /HPF / Bacteria: Few /HPF          Imaging: Chief Complaint:  Patient is a 63y old  Male who presents with a chief complaint of PEG tube malfunction (2019 13:26)      HPI:  62 yo M with PMH of cerebral palsy from group home, seizure disorder, spastic paraplegia, s/p PEG tube, BPH, bladder neck stricture s/p suprapubic cath, asthma, nephrolithiasis, chronic constipation was [facility] in [facility] for malfunction PEG tube. Tube cap was broken and contents were leaking. Collateral history obtained from aide at bedside (spends most of time with him at ). Patient or aide denied any complaints or symptoms - no abdominal pain, nausea, vomiting, fevers, chills, chest pain, SOB, or other issues.    Patient also had clogged suprapubic catheter. Urology evaluated patient in ED. Attempted irrigation but were unsuccessful, so changed out tube.    Allergies:  No Known Allergies    Hospital Medications:  acetaminophen    Suspension .. 650 milliGRAM(s) Oral every 6 hours PRN  ALBUTerol    0.083%. 2.5 milliGRAM(s) Nebulizer once PRN  ALBUTerol    90 MICROgram(s) HFA Inhaler 2 Puff(s) Inhalation every 6 hours PRN  artificial tears (preservative free) Ophthalmic Solution 1 Drop(s) Both EYES two times a day  baclofen 10 milliGRAM(s) Oral three times a day  calcium carbonate   1250 mG (OsCal) 1 Tablet(s) Oral daily  chlorhexidine 4% Liquid 1 Application(s) Topical <User Schedule>  docusate sodium Liquid 120 milliGRAM(s) Oral two times a day  enoxaparin Injectable 40 milliGRAM(s) SubCutaneous daily  lactulose Syrup 10 Gram(s) Oral three times a day PRN  polyethylene glycol 3350 17 Gram(s) Oral daily      PMHX/PSHX:  Asthma  Urinary retention  Urinary calculi  Spastic quadriplegia  Osteoporosis  Seizure  Cerebral palsy  BPH (benign prostatic hyperplasia)  Suprapubic catheter  History of suprapubic catheter  H/O cystoscopy  S/P percutaneous endoscopic gastrostomy (PEG) tube placement      Family history:  Family history unknown  No pertinent family history in first degree relatives    ROS:   difficult to obtain due to intellectual delay.      PHYSICAL EXAM:   Vital Signs:  Vital Signs Last 24 Hrs  T(C): 36.6 (2019 16:10), Max: 36.6 (2019 13:06)  T(F): 97.8 (2019 16:10), Max: 97.9 (2019 13:06)  HR: 73 (2019 16:10) (73 - 91)  BP: 94/50 (2019 16:10) (94/50 - 104/58)  BP(mean): --  RR: 18 (2019 16:10) (18 - 20)  SpO2: 94% (2019 13:06) (93% - 94%)  Daily     Daily     GENERAL:  Appears stated age, well-groomed, well-nourished, no distress  HEENT:  NC/AT,  conjunctivae clear and pink,, sclera -anicteric  Neck: no thyromegaly, nodules, adenopathy, no JVD  CHEST:  Full & symmetric excursion, no increased effort, breath sounds clear  HEART:  Regular rhythm, S1, S2, no murmur/rub/S3/S4, no abdominal bruit, no edema  ABDOMEN:  Soft, non-tender, non-distended, normoactive bowel sounds,  no masses ,no hepato-splenomegaly, PEG in place with no surrounding erythema or leakage.  SKIN:  No rash/erythema/ecchymoses/petechiae/wounds/abscess/warm/dry  NEURO:  Alert, oriented, no asterixis, no tremor, no encephalopathy    LABS:                        12.9   5.02  )-----------( 197      ( 2019 10:57 )             38.0     06-03    143  |  103  |  23<H>  ----------------------------<  97  4.2   |  26  |  0.6<L>    Ca    9.3      2019 10:57    TPro  7.0  /  Alb  3.7  /  TBili  0.3  /  DBili  x   /  AST  23  /  ALT  14  /  AlkPhos  97  06-03    LIVER FUNCTIONS - ( 2019 10:57 )  Alb: 3.7 g/dL / Pro: 7.0 g/dL / ALK PHOS: 97 U/L / ALT: 14 U/L / AST: 23 U/L / GGT: x             Urinalysis Basic - ( 2019 13:40 )    Color: Red / Appearance: Turbid / S.015 / pH: x  Gluc: x / Ketone: Negative  / Bili: Negative / Urobili: 1.0 mg/dL   Blood: x / Protein: 100 mg/dL / Nitrite: Positive   Leuk Esterase: Large / RBC: >50 /HPF / WBC 6-10 /HPF   Sq Epi: x / Non Sq Epi: Occasional /HPF / Bacteria: Few /HPF          Imaging:

## 2019-06-03 NOTE — H&P ADULT - NSHPPHYSICALEXAM_GEN_ALL_CORE
GEN: NAD, comfortable, contracted  CARDIO: RRR, no m/r/g  RESP: CTAB, no w/r/r  ABD: soft, NT/ND, PEG + suprapubic cath noted  EXT: no edema, UE + LE contracted  NEURO: AAOx1 - patient understands and is able to verbalize thoughts, though otherwise limited communication

## 2019-06-03 NOTE — H&P ADULT - ASSESSMENT
64 yo M with PMH of cerebral palsy from group home, seizure disorder, spastic paraplegia, s/p PEG tube, BPH, bladder neck stricture s/p suprapubic cath, asthma, nephrolithiasis, chronic constipation [facility] for malfunction [facility] tube. Patient also had clogged suprapubic catheter.    #) PEG tube malfunction - will get GI eval for exchange.    #) Suprapubic catheter malfunction - Urology following - s/p catheter exchange in ED, needs outpatient f/u for q monthly changes    #) Seizure disorder - c/w Phenobarbital    #) Spastic paraplegia - c/w Baclofen    #) BPH - c/w Flomax + Finasteride    #) Chronic constipation - c/w Senna + Colace + Lactulose / Milk of Mag PRN    #) Asthma - stable, c/w Ventolin PRN    DVT ppx: Lovenox subQ  Diet: NPO for now until PEG exchanged  Activity: OOBTC  Code status: FULL  Dispo: group home 64 yo M with PMH of cerebral palsy from group home, seizure disorder, spastic paraplegia, s/p PEG tube, BPH, bladder neck stricture s/p suprapubic cath, asthma, nephrolithiasis, chronic constipation [facility]. Patient also had clogged suprapubic catheter.    #) PEG tube malfunction  - spoke with GI team. Fellow placed new cap on PEG tube while patient in ED. Rx getting abd XR with gastrograffin through PEG to assess for proper placement. Can use PEG if no leak.    #) Suprapubic catheter malfunction - Urology following - s/p catheter exchange in ED, needs outpatient f/u for q monthly changes    #) Spastic paraplegia - c/w Baclofen    #) BPH - c/w Flomax + Finasteride    #) Chronic constipation - c/w Senna + Colace + Lactulose / Milk of Mag PRN    #) Asthma - stable, c/w Ventolin PRN    #) Seizure disorder? - patient has been seizure free for long time, Phenobarbital has been D/C'd on current med rec, will hold    DVT ppx: Lovenox subQ  Diet: Jevity  Activity: OOBTC  Code status: FULL  Dispo: group home within 24 hours 64 yo M with PMH of cerebral palsy from group home, seizure disorder, spastic paraplegia, s/p PEG tube, BPH, bladder neck stricture s/p suprapubic cath, asthma, nephrolithiasis, chronic constipation was sent in from [facility] for malfunction PEG tube. Patient also had clogged suprapubic catheter.    #) PEG tube malfunction  - spoke with GI team. Fellow placed new cap on PEG tube while patient in ED. Rx getting abd XR with gastrograffin through PEG to assess for proper placement. Can use PEG if no leak.    #) Suprapubic catheter malfunction - Urology following - s/p catheter exchange in ED, needs outpatient f/u for q monthly changes    #) Spastic paraplegia - c/w Baclofen    #) BPH - c/w Flomax + Finasteride    #) Chronic constipation - c/w Senna + Colace + Lactulose / Milk of Mag PRN    #) Asthma - stable, c/w Ventolin PRN    #) Seizure disorder? - patient has been seizure free for long time, Phenobarbital has been D/C'd on current med rec, will hold    DVT ppx: Lovenox subQ  Diet: Jevity in PEG tube  Activity: OOBTC  Code status: FULL  Dispo: group home within 24 hours 64 yo M with PMH of cerebral palsy from group home, seizure disorder, spastic paraplegia, s/p PEG tube, BPH, bladder neck stricture s/p suprapubic cath, asthma, nephrolithiasis, chronic constipation was sent in from skilled nursing for malfunction PEG tube. Patient also had clogged suprapubic catheter.    #) PEG tube malfunction  - spoke with GI team. Fellow placed new cap on PEG tube while patient in ED. Rx getting abd XR with gastrograffin through PEG to assess for proper placement. Can use PEG if no leak.    #) Suprapubic catheter malfunction - Urology following - s/p catheter exchange in ED, needs outpatient f/u for q monthly changes    #) Spastic paraplegia - c/w Baclofen    #) BPH - c/w Flomax + Finasteride    #) Chronic constipation - c/w Senna + Colace + Lactulose / Milk of Mag PRN    #) Asthma - stable, c/w Ventolin PRN    #) Seizure disorder? - patient has been seizure free for long time, Phenobarbital has been D/C'd on current med rec, will hold    DVT ppx: Lovenox subQ  Diet: Jevity via PEG tube  Activity: OOBTC  Code status: FULL  Dispo: group home within 24 hours

## 2019-06-03 NOTE — ED ADULT NURSE NOTE - NSIMPLEMENTINTERV_GEN_ALL_ED
Implemented All Fall with Harm Risk Interventions:  [place] to call system. Call bell, personal items and telephone within reach. Instruct patient to call for assistance. Room bathroom lighting operational. Non-slip footwear when patient is off stretcher. Physically safe environment: no spills, clutter or unnecessary equipment. Stretcher in lowest position, wheels locked, appropriate side rails in place. Provide visual cue, wrist band, yellow gown, etc. Monitor gait and stability. Monitor for mental status changes and reorient to person, place, and time. Review medications for side effects contributing to fall risk. Reinforce activity limits and safety measures with patient and family. Provide visual clues: red socks.

## 2019-06-03 NOTE — ED PROVIDER NOTE - PHYSICAL EXAMINATION
GEN: Well appearing, in no apparent distress.    HEAD:  Normocephalic, atraumatic.    EYES:  Clear conjunctivae without injection, drainage or discharge.    ENMT:  Nasal mucosa moist; mouth moist without ulcerations or lesions; throat moist without erythema, exudate, ulcerations or lesions.    NECK:  Supple, no masses. Normal ROM.    CARDIAC:  RRR, normal S1 and S2, no murmurs, rubs or gallops.    RESP:  Respiratory rate and effort appear normal; lungs are clear to auscultation bilaterally; no rhonchi, rales or wheezes.    ABDOMEN:  Soft, non-tender, non-distended, no masses. Normal BS throughout. [place]ouard bag small amount urine white and pus-like.     MUSCULOSKELETAL: No leg swelling, no calf tenderness.   NEURO:  AAO x 3.     SKIN:  Normal skin color for age and race, well-perfused; warm and dry.

## 2019-06-03 NOTE — CONSULT NOTE ADULT - ASSESSMENT
62 yo M with PMH of cerebral palsy from group home, seizure disorder, spastic paraplegia, s/p PEG tube, BPH, bladder neck stricture s/p suprapubic cath, asthma, nephrolithiasis, chronic constipation was sent in from [facility] for malfunction PEG tube. Tube cap was broken and contents were leaking.  PEG site was examined, the site showed no erythema or leakage. The adaptor was changed at bedside.  - PEG tube malfunction  s/p replacement of the adaptor  Plan:  obtain gastrografin study, if it shows PEG in place then can resume PEG feeds  local PEG care  recall prn

## 2019-06-03 NOTE — H&P ADULT - NSICDXPASTMEDICALHX_GEN_ALL_CORE_FT
PAST MEDICAL HISTORY:  Asthma     BPH (benign prostatic hyperplasia)     Cerebral palsy     Osteoporosis     Seizure last seizure >10 years ago    Spastic quadriplegia     Urinary calculi     Urinary retention

## 2019-06-03 NOTE — H&P ADULT - HISTORY OF PRESENT ILLNESS
62 yo M with PMH of cerebral palsy from group home, seizure disorder, spastic paraplegia, s/p PEG tube, BPH, bladder neck stricture s/p suprapubic cath, asthma, nephrolithiasis, chronic constipation was sent in from [facility] for malfunction PEG tube. Tube cap was broken and contents were leaking. Collateral history obtained from aide at bedside (spends most of time with him at ).    Patient also had clogged suprapubic catheter. Urology evaluated patient in ED. Attempted irrigation but were unsuccessful, so changed out tube. 64 yo M with PMH of cerebral palsy from group home, seizure disorder, spastic paraplegia, s/p PEG tube, BPH, bladder neck stricture s/p suprapubic cath, asthma, nephrolithiasis, chronic constipation was sent in from [facility] for malfunction PEG tube. Tube cap was broken and contents were leaking. Collateral history obtained from aide at bedside (spends most of time with him at ). Patient or aide deny any complaints or symptoms - no abdominal pain, nausea, vomiting, fevers, chills, chest pain, SOB, or other issues.    Patient also had clogged suprapubic catheter. Urology evaluated patient in ED. Attempted irrigation but were unsuccessful, so changed out tube.

## 2019-06-03 NOTE — CONSULT NOTE ADULT - SUBJECTIVE AND OBJECTIVE BOX
HPI:  64 y/o male with SPT catheter presenting for no urine output and leaking around catheter for the past 2 days. Pt seen and examined at bedside, denies any suprapubic tenderness, flank pain, fevers, chills, N/V.    SPT attempted irrigation unsuccessful. SPT catheter changed uder sterile technique. New 18 fr catheter placed, patient tolerated it well without any complications. Balloon inflated to 20cc. Drained 150cc cloudy blood tinged urine.     PAST MEDICAL & SURGICAL HISTORY:  Asthma  Urinary retention  Urinary calculi  Spastic quadriplegia  Osteoporosis  Seizure: last seizure &gt;10 years ago  Cerebral palsy  BPH (benign prostatic hyperplasia)  Suprapubic catheter  H/O cystoscopy  S/P percutaneous endoscopic gastrostomy (PEG) tube placement      REVIEW OF SYSTEMS:    CONSTITUTIONAL:  No fevers or chills  HEENT: No visual changes  ENDO: No sweating  NECK: No pain or stiffness  MUSCULOSKELETAL: No back pain, no joint pain  RESPIRATORY: No shortness of breath  CARDIOVASCULAR: No chest pain  GASTROINTESTINAL: No abdominal or epigastric pain. No nausea, vomiting,  No diarrhea or constipation.   NEUROLOGICAL: No mental status changes  PSYCH: No depression, no mood changes  SKIN: No itching      MEDICATIONS  (STANDING):    MEDICATIONS  (PRN):      Allergies    No Known Allergies    Intolerances        SOCIAL HISTORY: No illicit drug use    FAMILY HISTORY:  Family history unknown      Vital Signs Last 24 Hrs  T(C): 35.9 (03 Jun 2019 10:32), Max: 35.9 (03 Jun 2019 10:32)  T(F): 96.7 (03 Jun 2019 10:32), Max: 96.7 (03 Jun 2019 10:32)  HR: 88 (03 Jun 2019 10:32) (88 - 88)  BP: 104/58 (03 Jun 2019 10:32) (104/58 - 104/58)  BP(mean): --  RR: 20 (03 Jun 2019 10:32) (20 - 20)  SpO2: 93% (03 Jun 2019 10:32) (93% - 93%)    PHYSICAL EXAM:    Constitutional: NAD, contracted   Respiratory: No accessory respiratory muscle use  Abd: Soft, NT/ND, bladder non palpable, + SPT draining blood tinged cloudy urine        I&O's Summary      LABS:                        12.9   5.02  )-----------( 197      ( 03 Jun 2019 10:57 )             38.0     06-03    143  |  103  |  23<H>  ----------------------------<  97  4.2   |  26  |  0.6<L>    Ca    9.3      03 Jun 2019 10:57    TPro  7.0  /  Alb  3.7  /  TBili  0.3  /  DBili  x   /  AST  23  /  ALT  14  /  AlkPhos  97  06-03

## 2019-06-03 NOTE — ED PROVIDER NOTE - ATTENDING CONTRIBUTION TO CARE
63 M to ED with leaking PEG tube.  Tube cap broken and contents leaking out so to ED for eval.  pt also c/u clogged suprapubic tube.    AVSS, exam as noted, CTAB, RRR,

## 2019-06-03 NOTE — CONSULT NOTE ADULT - ASSESSMENT
62 y/o male with urinary retention and chronic SPT - s/p catheter exchange today  - Monitor UO  - UA, UCx  - R/B sono   - f/u with urology for monthly catheter change  - Will d/w attending

## 2019-06-03 NOTE — CONSULT NOTE ADULT - ATTENDING COMMENTS
I saw , examined , evaluated patient ,and agree with PA findings /  plan  ==  -increase size of SPC Q 48 hrs while inpt. to max size of26 or 28 f.

## 2019-06-03 NOTE — ED PROVIDER NOTE - OBJECTIVE STATEMENT
62 yo male with PMHx asthma, cerebral palsy, BPH, osteoporosis, seizure d/o (last year >10 years ago), kidney stones, retention s/p stricture s/p suprapubic catheter presents for decreased catheter output and missing cap on G tube. Patient denies fevers, chest pain, SOB, abdominal pain, nausea, vomiting, diarrhea, constipation, dizziness, weakness, urinary sx, cough, congestion, changes in PO intake, changes in mental status.

## 2019-06-03 NOTE — H&P ADULT - ATTENDING COMMENTS
62 yo M with PMH of cerebral palsy from group home, seizure disorder, spastic paraplegia, s/p PEG tube, BPH, bladder neck stricture s/p suprapubic cath, asthma, nephrolithiasis, chronic constipation was [facility] in from [facility] for malfunction PEG tube. Tube cap was broken and contents were leaking. PEG adaptor was exchanged at bedside by GI, XR with gastrografin showed proper position of PEG and also foreign body, likely within bowel. Pt is cleared by GI for discharge with Laxatives and repeat AbdXR in 2 weeks and Op f/u with GI.    Pt is clinically stable for discharge back to group home today. No changes of home medications except increased Miralax and Senna.

## 2019-06-04 ENCOUNTER — TRANSCRIPTION ENCOUNTER (OUTPATIENT)
Age: 64
End: 2019-06-04

## 2019-06-04 VITALS
DIASTOLIC BLOOD PRESSURE: 53 MMHG | SYSTOLIC BLOOD PRESSURE: 108 MMHG | RESPIRATION RATE: 18 BRPM | HEART RATE: 97 BPM | TEMPERATURE: 99 F

## 2019-06-04 LAB
HCV AB S/CO SERPL IA: 0.07 S/CO — SIGNIFICANT CHANGE UP (ref 0–0.99)
HCV AB SERPL-IMP: SIGNIFICANT CHANGE UP

## 2019-06-04 PROCEDURE — 99233 SBSQ HOSP IP/OBS HIGH 50: CPT

## 2019-06-04 PROCEDURE — 51705 CHANGE OF BLADDER TUBE: CPT | Mod: 78

## 2019-06-04 PROCEDURE — 99236 HOSP IP/OBS SAME DATE HI 85: CPT

## 2019-06-04 RX ORDER — LACTULOSE 10 G/15ML
1 SOLUTION ORAL
Qty: 0 | Refills: 0 | DISCHARGE

## 2019-06-04 RX ORDER — SENNA PLUS 8.6 MG/1
2 TABLET ORAL AT BEDTIME
Refills: 0 | Status: DISCONTINUED | OUTPATIENT
Start: 2019-06-04 | End: 2019-06-04

## 2019-06-04 RX ORDER — LACTULOSE 10 G/15ML
30 SOLUTION ORAL
Qty: 0 | Refills: 0 | DISCHARGE

## 2019-06-04 RX ORDER — PHENOBARBITAL 60 MG
64.8 TABLET ORAL DAILY
Refills: 0 | Status: DISCONTINUED | OUTPATIENT
Start: 2019-06-04 | End: 2019-06-04

## 2019-06-04 RX ORDER — SENNA PLUS 8.6 MG/1
2 TABLET ORAL
Qty: 60 | Refills: 0
Start: 2019-06-04 | End: 2019-07-03

## 2019-06-04 RX ADMIN — Medication 1 TABLET(S): at 12:56

## 2019-06-04 RX ADMIN — Medication 1 DROP(S): at 05:09

## 2019-06-04 RX ADMIN — POLYETHYLENE GLYCOL 3350 17 GRAM(S): 17 POWDER, FOR SOLUTION ORAL at 12:56

## 2019-06-04 RX ADMIN — CHLORHEXIDINE GLUCONATE 1 APPLICATION(S): 213 SOLUTION TOPICAL at 05:09

## 2019-06-04 RX ADMIN — Medication 10 MILLIGRAM(S): at 14:35

## 2019-06-04 RX ADMIN — Medication 64.8 MILLIGRAM(S): at 16:11

## 2019-06-04 RX ADMIN — ENOXAPARIN SODIUM 40 MILLIGRAM(S): 100 INJECTION SUBCUTANEOUS at 12:56

## 2019-06-04 NOTE — DISCHARGE NOTE PROVIDER - CARE PROVIDER_API CALL
Jenaro Cooley)  Urology  [address] [place] [address], [place] [address]  Phone: [phone]  Fax: [phone]  Follow Up Time:     Nely Lentz, Gastroenterology clinic, [address] [place] [address]  Phone: (   )    -  Fax: (   )    -  Follow Up Time:

## 2019-06-04 NOTE — DISCHARGE NOTE PROVIDER - CARE PROVIDERS DIRECT ADDRESSES
[email or web address]

## 2019-06-04 NOTE — PROGRESS NOTE ADULT - SUBJECTIVE AND OBJECTIVE BOX
Chief Complaint:  Patient is a 63y old  Male who presents with a chief complaint of PEG tube malfunction (2019 17:02)      Interval Events: no acute events overnight, Abdominal X ray with gastrograffin was performed and showed PEG in place. It also showed a radio-opaque material in RLQ, which has been there in prior imaging in 2019. patient is asymptomatic.    Allergies:  No Known Allergies    Hospital Medications:  acetaminophen    Suspension .. 650 milliGRAM(s) Oral every 6 hours PRN  ALBUTerol    0.083%. 2.5 milliGRAM(s) Nebulizer once PRN  ALBUTerol    90 MICROgram(s) HFA Inhaler 2 Puff(s) Inhalation every 6 hours PRN  artificial tears (preservative free) Ophthalmic Solution 1 Drop(s) Both EYES two times a day  baclofen 10 milliGRAM(s) Oral three times a day  calcium carbonate   1250 mG (OsCal) 1 Tablet(s) Oral daily  chlorhexidine 4% Liquid 1 Application(s) Topical <User Schedule>  docusate sodium Liquid 120 milliGRAM(s) Oral two times a day  enoxaparin Injectable 40 milliGRAM(s) SubCutaneous daily  lactulose Syrup 10 Gram(s) Oral three times a day PRN  polyethylene glycol 3350 17 Gram(s) Oral daily      PMHX/PSHX:  Asthma  Urinary retention  Urinary calculi  Spastic quadriplegia  Osteoporosis  Seizure  Cerebral palsy  BPH (benign prostatic hyperplasia)  Suprapubic catheter  History of suprapubic catheter  H/O cystoscopy  S/P percutaneous endoscopic gastrostomy (PEG) tube placement      Family history:  Family history unknown  No pertinent family history in first degree relatives      ROS:    Eyes:  Good vision, no reported pain  ENT:  No sore throat, pain, runny nose, dysphagia  CV:  No pain, palpitations, hypo/hypertension  Resp:  No dyspnea, cough, tachypnea, wheezing  GI:  No pain, No nausea, No vomiting, No diarrhea, No constipation, No weight loss, No fever, No pruritis, No rectal bleeding, No tarry stools, No dysphagia,  :  No pain, bleeding, incontinence, nocturia  Muscle:  No pain, weakness  Neuro:  No weakness, tingling, memory problems  Psych:  No fatigue, insomnia, mood problems, depression  Endocrine:  No polyuria, polydipsia, cold/heat intolerance  Heme:  No petechiae, ecchymosis, easy bruisability  Skin:  No rash, tattoos, scars, edema      PHYSICAL EXAM:   Vital Signs:  Vital Signs Last 24 Hrs  T(C): 35.7 (2019 08:00), Max: 36.8 (2019 23:00)  T(F): 96.3 (2019 08:00), Max: 98.2 (2019 23:00)  HR: 58 (2019 08:00) (58 - 94)  BP: 105/82 (2019 08:00) (94/50 - 110/55)  BP(mean): --  RR: 18 (2019 08:00) (18 - 18)  SpO2: 94% (2019 13:06) (94% - 94%)  Daily     Daily     GENERAL:  Appears stated age, well-groomed, well-nourished, no distress  HEENT:  NC/AT,  conjunctivae clear and pink, no thyromegaly, nodules, adenopathy, no JVD, sclera -anicteric  CHEST:  Full & symmetric excursion, no increased effort, breath sounds clear  HEART:  Regular rhythm, S1, S2, no murmur/rub/S3/S4, no abdominal bruit, no edema  ABDOMEN:  Soft, non-tender, non-distended, normoactive bowel sounds,  no masses ,no hepato-splenomegaly, PEG in place  EXTEREMITIES:  no cyanosis,clubbing or edema  SKIN:  No rash/erythema/ecchymoses/petechiae/wounds/abscess/warm/dry  NEURO:  Alert, oriented, no asterixis, no tremor, no encephalopathy    LABS:                        12.9   5.02  )-----------( 197      ( 2019 10:57 )             38.0     06-03    143  |  103  |  23<H>  ----------------------------<  97  4.2   |  26  |  0.6<L>    Ca    9.3      2019 10:57    TPro  7.0  /  Alb  3.7  /  TBili  0.3  /  DBili  x   /  AST  23  /  ALT  14  /  AlkPhos  97  06-03    LIVER FUNCTIONS - ( 2019 10:57 )  Alb: 3.7 g/dL / Pro: 7.0 g/dL / ALK PHOS: 97 U/L / ALT: 14 U/L / AST: 23 U/L / GGT: x             Urinalysis Basic - ( 2019 13:40 )    Color: Red / Appearance: Turbid / S.015 / pH: x  Gluc: x / Ketone: Negative  / Bili: Negative / Urobili: 1.0 mg/dL   Blood: x / Protein: 100 mg/dL / Nitrite: Positive   Leuk Esterase: Large / RBC: >50 /HPF / WBC 6-10 /HPF   Sq Epi: x / Non Sq Epi: Occasional /HPF / Bacteria: Few /HPF          Imaging: Chief Complaint:  Patient is a 63y old  Male who presents with a chief complaint of PEG tube malfunction (2019 17:02)      Interval Events: no acute events overnight, Abdominal X ray with gastrograffin was performed and showed PEG in place. It also showed a radio-opaque material in RLQ, which has been there in prior imaging in 2019. patient is asymptomatic.    Allergies:  No Known Allergies    Hospital Medications:  acetaminophen    Suspension .. 650 milliGRAM(s) Oral every 6 hours PRN  ALBUTerol    0.083%. 2.5 milliGRAM(s) Nebulizer once PRN  ALBUTerol    90 MICROgram(s) HFA Inhaler 2 Puff(s) Inhalation every 6 hours PRN  artificial tears (preservative free) Ophthalmic Solution 1 Drop(s) Both EYES two times a day  baclofen 10 milliGRAM(s) Oral three times a day  calcium carbonate   1250 mG (OsCal) 1 Tablet(s) Oral daily  chlorhexidine 4% Liquid 1 Application(s) Topical <User Schedule>  docusate sodium Liquid 120 milliGRAM(s) Oral two times a day  enoxaparin Injectable 40 milliGRAM(s) SubCutaneous daily  lactulose Syrup 10 Gram(s) Oral three times a day PRN  polyethylene glycol 3350 17 Gram(s) Oral daily      PMHX/PSHX:  Asthma  Urinary retention  Urinary calculi  Spastic quadriplegia  Osteoporosis  Seizure  Cerebral palsy  BPH (benign prostatic hyperplasia)  Suprapubic catheter  History of suprapubic catheter  H/O cystoscopy  S/P percutaneous endoscopic gastrostomy (PEG) tube placement      Family history:  Family history unknown  No pertinent family history in first degree relatives      ROS:    Eyes:  Good vision, no reported pain  ENT:  No sore throat, pain, runny nose, dysphagia  CV:  No pain, palpitations, hypo/hypertension  Resp:  No dyspnea, cough, tachypnea, wheezing  GI:  No pain, No nausea, No vomiting, No diarrhea, No constipation, No weight loss, No fever, No pruritis, No rectal bleeding, No tarry stools, No dysphagia,  :  No pain, bleeding, incontinence, nocturia  Muscle:  No pain, weakness  Neuro:  No weakness, tingling, memory problems  Psych:  No fatigue, insomnia, mood problems, depression  Endocrine:  No polyuria, polydipsia, cold/heat intolerance  Heme:  No petechiae, ecchymosis, easy bruisability  Skin:  No rash, tattoos, scars, edema      PHYSICAL EXAM:   Vital Signs:  Vital Signs Last 24 Hrs  T(C): 35.7 (2019 08:00), Max: 36.8 (2019 23:00)  T(F): 96.3 (2019 08:00), Max: 98.2 (2019 23:00)  HR: 58 (2019 08:00) (58 - 94)  BP: 105/82 (2019 08:00) (94/50 - 110/55)  BP(mean): --  RR: 18 (2019 08:00) (18 - 18)  SpO2: 94% (2019 13:06) (94% - 94%)  Daily     Daily     GENERAL:  Appears stated age, well-groomed, well-nourished, no distress  HEENT:  NC/AT,  conjunctivae clear and pink, no thyromegaly, nodules, adenopathy, no JVD, sclera -anicteric  CHEST:  Full & symmetric excursion, no increased effort, breath sounds clear  HEART:  Regular rhythm, S1, S2, no murmur/rub/S3/S4, no abdominal bruit, no edema  ABDOMEN:  Soft, non-tender, non-distended, normoactive bowel sounds,  no masses ,no hepato-splenomegaly, PEG in place  EXTEREMITIES:  no cyanosis,clubbing or edema  SKIN:  No rash/erythema/ecchymoses/petechiae/wounds/abscess/warm/dry  NEURO:  Alert, oriented, no asterixis, no tremor, no encephalopathy    LABS:                        12.9   5.02  )-----------( 197      ( 2019 10:57 )             38.0     06-03    143  |  103  |  23<H>  ----------------------------<  97  4.2   |  26  |  0.6<L>    Ca    9.3      2019 10:57    TPro  7.0  /  Alb  3.7  /  TBili  0.3  /  DBili  x   /  AST  23  /  ALT  14  /  AlkPhos  97  06-03    LIVER FUNCTIONS - ( 2019 10:57 )  Alb: 3.7 g/dL / Pro: 7.0 g/dL / ALK PHOS: 97 U/L / ALT: 14 U/L / AST: 23 U/L / GGT: x             Urinalysis Basic - ( 2019 13:40 )    Color: Red / Appearance: Turbid / S.015 / pH: x  Gluc: x / Ketone: Negative  / Bili: Negative / Urobili: 1.0 mg/dL   Blood: x / Protein: 100 mg/dL / Nitrite: Positive   Leuk Esterase: Large / RBC: >50 /HPF / WBC 6-10 /HPF   Sq Epi: x / Non Sq Epi: Occasional /HPF / Bacteria: Few /HPF          Imaging: reviewed with radiologist

## 2019-06-04 NOTE — PROGRESS NOTE ADULT - ASSESSMENT
64 yo M with PMH of cerebral palsy from group home, seizure disorder, spastic paraplegia, s/p PEG tube, BPH, bladder neck stricture s/p suprapubic cath, asthma, nephrolithiasis, chronic constipation was sent in from [facility] for malfunction PEG tube. Tube cap was broken and contents were leaking.  PEG site was examined, the site showed no erythema or leakage. The adaptor was changed at bedside. Abdominal X ray with gastrograffin was performed and showed PEG in place. It also showed a radio-opaque material in RLQ, which has been there in prior imaging in April 2019. patient is asymptomatic.  - PEG tube malfunction  s/p replacement of the adaptor  Plan:  obtain gastrografin study, if it shows PEG in place then can resume PEG feeds  local PEG care    - Foreign body in right lower quadrant:  has been there since April 2019, patient is asymptomatic  unclear its nature  please d/c patient on Miralax once daily  repeat Abdominal X ray in 2 weeks  follow up in GI clinic in [address] [place] [address], second floor on 6/28 at 9 am. 62 yo M with PMH of cerebral palsy from group home, seizure disorder, spastic paraplegia, s/p PEG tube, BPH, bladder neck stricture s/p suprapubic cath, asthma, nephrolithiasis, chronic constipation was sent in from [facility] for malfunction PEG tube. Tube cap was broken and contents were leaking.  PEG site was examined, the site showed no erythema or leakage. The adaptor was changed at bedside. Abdominal X ray with gastrograffin was performed and showed PEG in place. It also showed a radio-opaque material in RLQ, which has been there in prior imaging in April 2019. patient is asymptomatic.  - PEG tube malfunction  s/p replacement of the adaptor  Plan:  obtain gastrografin study, if it shows PEG in place then can resume PEG feeds  local PEG care    - Foreign body in right lower quadrant:  has been there since April 2019, patient is asymptomatic  unclear its nature  please d/c patient on Miralax once daily  repeat Abdominal X ray in 2 weeks  follow up in GI clinic at [address] [place] [address], second floor on 6/28 at 9 am.

## 2019-06-04 NOTE — PROGRESS NOTE ADULT - SUBJECTIVE AND OBJECTIVE BOX
Progress Note    Subjective  64y/o M s/p SPT exchange yesterday 6/3 due to no urine output from original SPT. Pt seen and examined at bedside. No acute complaints at this time.     Objective    Vital signs  T(C): , Max: 37.1 (06-04-19 @ 16:17)  HR: 97 (06-04-19 @ 16:17)  BP: 108/53 (06-04-19 @ 16:17)      Gen: awake, NAD, contracted  : SPT draining cloudy yellow urine.     Labs                        12.9   5.02  )-----------( 197      ( 03 Jun 2019 10:57 )             38.0     03 Jun 2019 10:57    143    |  103    |  23     ----------------------------<  97     4.2     |  26     |  0.6      Ca    9.3        03 Jun 2019 10:57

## 2019-06-04 NOTE — DISCHARGE NOTE NURSING/CASE MANAGEMENT/SOCIAL WORK - NSDCDPATPORTLINK_GEN_ALL_CORE
You can access the [facility], offered by [facility], by registering with the following website: [email or web address]

## 2019-06-04 NOTE — DISCHARGE NOTE PROVIDER - HOSPITAL COURSE
64 yo M with PMH of cerebral palsy from group home, seizure disorder, spastic paraplegia, s/p PEG tube, BPH, bladder neck stricture s/p suprapubic cath, asthma, nephrolithiasis, chronic constipation was sent in from shelter for malfunction PEG tube and clogged suprapubic catheter. PEG tube cap was broken and contents were leaking. GI placed a new cap on PEG tube. PEG tube study showed PEG tube is in place. Feeds were restarted. On XR abd, RLQ has foreign body that is 10 mm. In CT abd 4/24/19, a metallic density was also noted in the cecum. Pt was cleared by GI for d/c (Dr. Lentz on phone), continue with home lactulose, miralax 2x/day, and colace and add senna to titrate to daily BM. Repeat abd XR in 2 week to monitor foreign body and f/u GI clinic.    Patient also had clogged suprapubic catheter. Urology evaluated patient in ED. Attempted irrigation but were unsuccessful, so changed out tube. 64 yo M with PMH of cerebral palsy from group home, seizure disorder, spastic paraplegia, s/p PEG tube, BPH, bladder neck stricture s/p suprapubic cath, asthma, nephrolithiasis, chronic constipation [facility] and clogged suprapubic catheter. PEG tube cap was broken and contents were leaking. GI placed a new cap on PEG tube. PEG tube study showed PEG tube is in place. Feeds were restarted. On XR abd, RLQ has foreign body that is 10 mm. In CT abd 4/24/19, a metallic density was also noted in the cecum. Pt was cleared by GI after reviewing imaging studies for d/c (Dr. Lentz on phone), continue with home lactulose, miralax 2x/day, and colace and add senna to titrate to daily BM. Repeat abd XR in 2 week to monitor foreign body and f/u GI clinic.    Patient also had clogged suprapubic catheter. Urology evaluated patient in ED. Attempted irrigation but were unsuccessful, so changed out tube.

## 2019-06-04 NOTE — PROGRESS NOTE ADULT - ASSESSMENT
64y/o M s/p SPT replacement, catheter draining well.    - Seen and examined at bedside with Dr. Cooper  - Pt to follow up with Dr. Joyner on Thursday for SPT upsize exchange.

## 2019-06-04 NOTE — PROVIDER CONTACT NOTE (OTHER) - REASON
Xray results of abdomen for PEG placement still not read. Cannot give meds through PEG tube for 0600.

## 2019-06-04 NOTE — DISCHARGE NOTE PROVIDER - NSDCCPCAREPLAN_GEN_ALL_CORE_FT
PRINCIPAL DISCHARGE DIAGNOSIS  Diagnosis: PEG tube malfunction  Assessment and Plan of Treatment: PEG tube cap was replaced by gastroenterologist.   Continue PEG tube feed.  Follow up with gastroenterologist Dr. Lentz on June 28 at 9 AM in [address] [place] [address], Second floor.  Return to emergency room if worsening symptoms.      SECONDARY DISCHARGE DIAGNOSES  Diagnosis: Foreign body (FB) in soft tissue  Assessment and Plan of Treatment: There is metallic foreign body that is 10 mm in the right lower abdomen on X ray. It was present in the CT scan on 4/24/19 in the cecum. The object is not sharp. Gastroenterologist recommend laxatives to titrate bowel movement everyday to pass the object (continue miralax 2x/day, lactulose as needed, colace, and add senna). Repeat abdomen X ray in 2 weeks before following up with gastroenterologist Dr. Lentz/ Dr. Dalton on June 28 at 9 AM.    Diagnosis: Suprapubic catheter dysfunction  Assessment and Plan of Treatment: Urology exchanged the suprapubic catheter. Follow up with urologist Dr. Cooley next week for kidney and bladder sonogram and suprapubic catheter management. PRINCIPAL DISCHARGE DIAGNOSIS  Diagnosis: PEG tube malfunction  Assessment and Plan of Treatment: PEG tube cap was replaced by gastroenterologist.   Continue PEG tube feed.  Follow up with gastroenterologist Dr. Lentz on June 28 at 9 AM in [address] [place] [address], Second floor.  Return to emergency room if worsening symptoms.      SECONDARY DISCHARGE DIAGNOSES  Diagnosis: Foreign body (FB) in soft tissue  Assessment and Plan of Treatment: There is metallic foreign body that is 10 mm in the right lower abdomen on X ray. It was present in the CT scan on 4/24/19 in the cecum. The object is not sharp. Gastroenterologist recommend laxatives to titrate to have bowel movement daily to pass the object (continue miralax, lactulose, colace, and add senna). Repeat abdomen X ray in 2 weeks before following up with gastroenterologist Dr. Lentz/ Dr. Dalton on June 28 at 9 AM.    Diagnosis: Suprapubic catheter dysfunction  Assessment and Plan of Treatment: Urology exchanged the suprapubic catheter. Follow up with urologist Dr. Cooley next week for kidney and bladder sonogram and suprapubic catheter management.

## 2019-06-04 NOTE — DISCHARGE NOTE PROVIDER - PROVIDER TOKENS
PROVIDER:[TOKEN:[[id number]]],FREE:[LAST:[Tor],FIRST:[Nely],PHONE:[(   )    -],FAX:[(   )    -],ADDRESS:[[facility], Gastroenterology clinic, [address] [place] [address]]]

## 2019-06-06 ENCOUNTER — APPOINTMENT (OUTPATIENT)
Dept: UROLOGY | Facility: CLINIC | Age: 64
End: 2019-06-06
Payer: MEDICARE

## 2019-06-06 PROCEDURE — 51705 CHANGE OF BLADDER TUBE: CPT | Mod: 58

## 2019-06-15 ENCOUNTER — INPATIENT (INPATIENT)
Facility: HOSPITAL | Age: 64
LOS: 4 days | Discharge: NURSING, REHAB OR LONG-TERM CARE | End: 2019-06-20
Attending: INTERNAL MEDICINE | Admitting: INTERNAL MEDICINE
Payer: MEDICARE

## 2019-06-15 VITALS
SYSTOLIC BLOOD PRESSURE: 130 MMHG | RESPIRATION RATE: 20 BRPM | OXYGEN SATURATION: 96 % | DIASTOLIC BLOOD PRESSURE: 88 MMHG | HEART RATE: 108 BPM | WEIGHT: 160.06 LBS | TEMPERATURE: 97 F

## 2019-06-15 DIAGNOSIS — Z93.59 OTHER CYSTOSTOMY STATUS: Chronic | ICD-10-CM

## 2019-06-15 DIAGNOSIS — Z93.1 GASTROSTOMY STATUS: Chronic | ICD-10-CM

## 2019-06-15 DIAGNOSIS — Z98.890 OTHER SPECIFIED POSTPROCEDURAL STATES: Chronic | ICD-10-CM

## 2019-06-15 PROCEDURE — 99285 EMERGENCY DEPT VISIT HI MDM: CPT

## 2019-06-15 RX ORDER — SODIUM CHLORIDE 9 MG/ML
500 INJECTION, SOLUTION INTRAVENOUS ONCE
Refills: 0 | Status: COMPLETED | OUTPATIENT
Start: 2019-06-15 | End: 2019-06-15

## 2019-06-15 RX ORDER — MORPHINE SULFATE 50 MG/1
4 CAPSULE, EXTENDED RELEASE ORAL ONCE
Refills: 0 | Status: DISCONTINUED | OUTPATIENT
Start: 2019-06-15 | End: 2019-06-15

## 2019-06-15 NOTE — ED ADULT TRIAGE NOTE - CHIEF COMPLAINT QUOTE
Pt came c/o abdominal pain since today, pt has feeding tube and suprapubic cath as per staff member.

## 2019-06-16 LAB
ALBUMIN SERPL ELPH-MCNC: 4.2 G/DL — SIGNIFICANT CHANGE UP (ref 3.5–5.2)
ALP SERPL-CCNC: 100 U/L — SIGNIFICANT CHANGE UP (ref 30–115)
ALT FLD-CCNC: 16 U/L — SIGNIFICANT CHANGE UP (ref 0–41)
ANION GAP SERPL CALC-SCNC: 15 MMOL/L — HIGH (ref 7–14)
APPEARANCE UR: ABNORMAL
APTT BLD: 30.8 SEC — SIGNIFICANT CHANGE UP (ref 27–39.2)
AST SERPL-CCNC: 20 U/L — SIGNIFICANT CHANGE UP (ref 0–41)
BACTERIA # UR AUTO: ABNORMAL /HPF
BASOPHILS # BLD AUTO: 0.02 K/UL — SIGNIFICANT CHANGE UP (ref 0–0.2)
BASOPHILS NFR BLD AUTO: 0.2 % — SIGNIFICANT CHANGE UP (ref 0–1)
BILIRUB SERPL-MCNC: 0.2 MG/DL — SIGNIFICANT CHANGE UP (ref 0.2–1.2)
BILIRUB UR-MCNC: NEGATIVE — SIGNIFICANT CHANGE UP
BUN SERPL-MCNC: 23 MG/DL — HIGH (ref 10–20)
CALCIUM SERPL-MCNC: 9.4 MG/DL — SIGNIFICANT CHANGE UP (ref 8.5–10.1)
CHLORIDE SERPL-SCNC: 105 MMOL/L — SIGNIFICANT CHANGE UP (ref 98–110)
CO2 SERPL-SCNC: 24 MMOL/L — SIGNIFICANT CHANGE UP (ref 17–32)
COLOR SPEC: YELLOW — SIGNIFICANT CHANGE UP
CREAT SERPL-MCNC: 0.6 MG/DL — LOW (ref 0.7–1.5)
DIFF PNL FLD: ABNORMAL
EOSINOPHIL # BLD AUTO: 0.02 K/UL — SIGNIFICANT CHANGE UP (ref 0–0.7)
EOSINOPHIL NFR BLD AUTO: 0.2 % — SIGNIFICANT CHANGE UP (ref 0–8)
EPI CELLS # UR: ABNORMAL /HPF
GLUCOSE SERPL-MCNC: 165 MG/DL — HIGH (ref 70–99)
GLUCOSE UR QL: NEGATIVE MG/DL — SIGNIFICANT CHANGE UP
HCT VFR BLD CALC: 41.8 % — LOW (ref 42–52)
HGB BLD-MCNC: 14.2 G/DL — SIGNIFICANT CHANGE UP (ref 14–18)
IMM GRANULOCYTES NFR BLD AUTO: 0.3 % — SIGNIFICANT CHANGE UP (ref 0.1–0.3)
INR BLD: 1.09 RATIO — SIGNIFICANT CHANGE UP (ref 0.65–1.3)
KETONES UR-MCNC: NEGATIVE — SIGNIFICANT CHANGE UP
LACTATE SERPL-SCNC: 1.9 MMOL/L — SIGNIFICANT CHANGE UP (ref 0.5–2.2)
LEUKOCYTE ESTERASE UR-ACNC: ABNORMAL
LIDOCAIN IGE QN: 21 U/L — SIGNIFICANT CHANGE UP (ref 7–60)
LYMPHOCYTES # BLD AUTO: 0.58 K/UL — LOW (ref 1.2–3.4)
LYMPHOCYTES # BLD AUTO: 5.3 % — LOW (ref 20.5–51.1)
MCHC RBC-ENTMCNC: 30.9 PG — SIGNIFICANT CHANGE UP (ref 27–31)
MCHC RBC-ENTMCNC: 34 G/DL — SIGNIFICANT CHANGE UP (ref 32–37)
MCV RBC AUTO: 90.9 FL — SIGNIFICANT CHANGE UP (ref 80–94)
MONOCYTES # BLD AUTO: 0.5 K/UL — SIGNIFICANT CHANGE UP (ref 0.1–0.6)
MONOCYTES NFR BLD AUTO: 4.5 % — SIGNIFICANT CHANGE UP (ref 1.7–9.3)
NEUTROPHILS # BLD AUTO: 9.88 K/UL — HIGH (ref 1.4–6.5)
NEUTROPHILS NFR BLD AUTO: 89.5 % — HIGH (ref 42.2–75.2)
NITRITE UR-MCNC: NEGATIVE — SIGNIFICANT CHANGE UP
NRBC # BLD: 0 /100 WBCS — SIGNIFICANT CHANGE UP (ref 0–0)
PH UR: 8 — SIGNIFICANT CHANGE UP (ref 5–8)
PLATELET # BLD AUTO: 207 K/UL — SIGNIFICANT CHANGE UP (ref 130–400)
POTASSIUM SERPL-MCNC: 3.7 MMOL/L — SIGNIFICANT CHANGE UP (ref 3.5–5)
POTASSIUM SERPL-SCNC: 3.7 MMOL/L — SIGNIFICANT CHANGE UP (ref 3.5–5)
PROT SERPL-MCNC: 7.6 G/DL — SIGNIFICANT CHANGE UP (ref 6–8)
PROT UR-MCNC: 100 MG/DL
PROTHROM AB SERPL-ACNC: 12.5 SEC — SIGNIFICANT CHANGE UP (ref 9.95–12.87)
RBC # BLD: 4.6 M/UL — LOW (ref 4.7–6.1)
RBC # FLD: 13.2 % — SIGNIFICANT CHANGE UP (ref 11.5–14.5)
RBC CASTS # UR COMP ASSIST: ABNORMAL /HPF
SODIUM SERPL-SCNC: 144 MMOL/L — SIGNIFICANT CHANGE UP (ref 135–146)
SP GR SPEC: 1.01 — SIGNIFICANT CHANGE UP (ref 1.01–1.03)
TRI-PHOS CRY UR QL COMP ASSIST: ABNORMAL /HPF
UROBILINOGEN FLD QL: 0.2 MG/DL — SIGNIFICANT CHANGE UP (ref 0.2–0.2)
WBC # BLD: 11.03 K/UL — HIGH (ref 4.8–10.8)
WBC # FLD AUTO: 11.03 K/UL — HIGH (ref 4.8–10.8)
WBC UR QL: ABNORMAL /HPF

## 2019-06-16 PROCEDURE — 74177 CT ABD & PELVIS W/CONTRAST: CPT | Mod: 26

## 2019-06-16 RX ORDER — MORPHINE SULFATE 50 MG/1
4 CAPSULE, EXTENDED RELEASE ORAL ONCE
Refills: 0 | Status: DISCONTINUED | OUTPATIENT
Start: 2019-06-16 | End: 2019-06-16

## 2019-06-16 RX ORDER — SENNA PLUS 8.6 MG/1
2 TABLET ORAL AT BEDTIME
Refills: 0 | Status: DISCONTINUED | OUTPATIENT
Start: 2019-06-16 | End: 2019-06-20

## 2019-06-16 RX ORDER — PHENOBARBITAL 60 MG
64.8 TABLET ORAL DAILY
Refills: 0 | Status: DISCONTINUED | OUTPATIENT
Start: 2019-06-16 | End: 2019-06-20

## 2019-06-16 RX ORDER — BACLOFEN 100 %
10 POWDER (GRAM) MISCELLANEOUS EVERY 8 HOURS
Refills: 0 | Status: DISCONTINUED | OUTPATIENT
Start: 2019-06-16 | End: 2019-06-20

## 2019-06-16 RX ORDER — ACETAMINOPHEN 500 MG
650 TABLET ORAL EVERY 4 HOURS
Refills: 0 | Status: DISCONTINUED | OUTPATIENT
Start: 2019-06-16 | End: 2019-06-20

## 2019-06-16 RX ORDER — LACTULOSE 10 G/15ML
30 SOLUTION ORAL EVERY 6 HOURS
Refills: 0 | Status: COMPLETED | OUTPATIENT
Start: 2019-06-16 | End: 2019-06-17

## 2019-06-16 RX ORDER — ALBUTEROL 90 UG/1
2 AEROSOL, METERED ORAL EVERY 6 HOURS
Refills: 0 | Status: DISCONTINUED | OUTPATIENT
Start: 2019-06-16 | End: 2019-06-20

## 2019-06-16 RX ORDER — DOCUSATE SODIUM 100 MG
100 CAPSULE ORAL
Refills: 0 | Status: DISCONTINUED | OUTPATIENT
Start: 2019-06-16 | End: 2019-06-16

## 2019-06-16 RX ORDER — MORPHINE SULFATE 50 MG/1
4 CAPSULE, EXTENDED RELEASE ORAL EVERY 4 HOURS
Refills: 0 | Status: DISCONTINUED | OUTPATIENT
Start: 2019-06-16 | End: 2019-06-20

## 2019-06-16 RX ORDER — CEFTRIAXONE 500 MG/1
1 INJECTION, POWDER, FOR SOLUTION INTRAMUSCULAR; INTRAVENOUS EVERY 24 HOURS
Refills: 0 | Status: DISCONTINUED | OUTPATIENT
Start: 2019-06-16 | End: 2019-06-20

## 2019-06-16 RX ORDER — SALICYLIC ACID 0.5 %
1 CLEANSER (GRAM) TOPICAL
Refills: 0 | Status: DISCONTINUED | OUTPATIENT
Start: 2019-06-16 | End: 2019-06-20

## 2019-06-16 RX ORDER — DOCUSATE SODIUM 100 MG
100 CAPSULE ORAL
Refills: 0 | Status: DISCONTINUED | OUTPATIENT
Start: 2019-06-16 | End: 2019-06-20

## 2019-06-16 RX ORDER — POLYETHYLENE GLYCOL 3350 17 G/17G
17 POWDER, FOR SOLUTION ORAL DAILY
Refills: 0 | Status: DISCONTINUED | OUTPATIENT
Start: 2019-06-16 | End: 2019-06-20

## 2019-06-16 RX ORDER — CEFTRIAXONE 500 MG/1
1 INJECTION, POWDER, FOR SOLUTION INTRAMUSCULAR; INTRAVENOUS ONCE
Refills: 0 | Status: COMPLETED | OUTPATIENT
Start: 2019-06-16 | End: 2019-06-16

## 2019-06-16 RX ADMIN — SODIUM CHLORIDE 500 MILLILITER(S): 9 INJECTION, SOLUTION INTRAVENOUS at 00:36

## 2019-06-16 RX ADMIN — Medication 10 MILLIGRAM(S): at 13:13

## 2019-06-16 RX ADMIN — SENNA PLUS 2 TABLET(S): 8.6 TABLET ORAL at 21:07

## 2019-06-16 RX ADMIN — POLYETHYLENE GLYCOL 3350 17 GRAM(S): 17 POWDER, FOR SOLUTION ORAL at 11:53

## 2019-06-16 RX ADMIN — Medication 100 MILLIGRAM(S): at 18:11

## 2019-06-16 RX ADMIN — MORPHINE SULFATE 4 MILLIGRAM(S): 50 CAPSULE, EXTENDED RELEASE ORAL at 06:32

## 2019-06-16 RX ADMIN — CEFTRIAXONE 100 GRAM(S): 500 INJECTION, POWDER, FOR SOLUTION INTRAMUSCULAR; INTRAVENOUS at 18:10

## 2019-06-16 RX ADMIN — Medication 10 MILLIGRAM(S): at 21:07

## 2019-06-16 RX ADMIN — MORPHINE SULFATE 4 MILLIGRAM(S): 50 CAPSULE, EXTENDED RELEASE ORAL at 03:30

## 2019-06-16 RX ADMIN — Medication 1 APPLICATION(S): at 18:34

## 2019-06-16 RX ADMIN — Medication 64.8 MILLIGRAM(S): at 11:53

## 2019-06-16 RX ADMIN — LACTULOSE 30 GRAM(S): 10 SOLUTION ORAL at 11:52

## 2019-06-16 RX ADMIN — MORPHINE SULFATE 4 MILLIGRAM(S): 50 CAPSULE, EXTENDED RELEASE ORAL at 00:36

## 2019-06-16 RX ADMIN — CEFTRIAXONE 100 GRAM(S): 500 INJECTION, POWDER, FOR SOLUTION INTRAMUSCULAR; INTRAVENOUS at 06:32

## 2019-06-16 RX ADMIN — LACTULOSE 30 GRAM(S): 10 SOLUTION ORAL at 18:11

## 2019-06-16 NOTE — H&P ADULT - NSHPLABSRESULTS_GEN_ALL_CORE
14.2    )-----------( 207      ( 15 Kendrick 2019 23:55 )             41.8       06-15    144  |  105  |  23<H>  ----------------------------<  165<H>  3.7   |  24  |  0.6<L>    Ca    9.4      15 Kendrick 2019 23:55    TPro  7.6  /  Alb  4.2  /  TBili  0.2  /  DBili  x   /  AST  20  /  ALT  16  /  AlkPhos  100  06-15              Urinalysis Basic - ( 15 Kendrick 2019 23:55 )    Color: Yellow / Appearance: Cloudy / S.015 / pH: x  Gluc: x / Ketone: Negative  / Bili: Negative / Urobili: 0.2 mg/dL   Blood: x / Protein: 100 mg/dL / Nitrite: Negative   Leuk Esterase: Large / RBC: 3-5 /HPF / WBC 6-10 /HPF   Sq Epi: x / Non Sq Epi: Few /HPF / Bacteria: Many /HPF        PT/INR - ( 15 Kendrick 2019 23:55 )   PT: 12.50 sec;   INR: 1.09 ratio         PTT - ( 15 Kendrick 2019 23:55 )  PTT:30.8 sec    Lactate Trend  06-15 @ 23:55 Lactate:1.9             CAPILLARY BLOOD GLUCOSE      Culture Results:   >100,000 CFU/ml Morganella morganii ( @ 13:40)    < from: CT Abdomen and Pelvis w/ IV Cont (19 @ 03:34) >    Interval removal of right percutaneous nephroureterostomy tube.    Interval change of suprapubic catheter.    New mild left hydronephrosis with periureteral inflammation to the level   of the urinary bladder without definite evidence for obstructing   calculus. *The tip of the suprapubic catheter appears at the orifice of   the distal left ureter/UVJ (series 5, image 342), possibly causing   obstruction*.    There is stable urinary bladder wall thickening. Mucosal hyperenhancement   is noted. Limited CT evaluation for urinary infection.     Moderate volume stool in the rectum.    < end of copied text >

## 2019-06-16 NOTE — ED PROVIDER NOTE - ATTENDING CONTRIBUTION TO CARE
63 year old male, pmhx cerebral palsy, seizures, paraplegia, asthma, suprapubic catheter for urinary retention, G tube, presenting with diffuse abdominal pain x 1 day that he states is crampy, non-radiating, 10/10, without palliative or provocative factors. Denies other symptoms or complaints including fevers, headache, vision changes, weakness/numbness, confusion, URI symptoms, neck pain, chest pain, back pain, dyspnea, cough, palpitations, nausea, vomiting, diarrhea, constipation, blood in stool/dark stools, urinary symptoms, penile discharge/testicular pain, leg swelling, rash, recent travel or sick contacts.    Vital Signs: I have reviewed the initial vital signs.  Constitutional: NAD, well-nourished, appears stated age, no acute distress.  HEENT: Airway patent, moist MM, no erythema/swelling/deformity of oral structures. EOMI, PERRLA.  CV: regular rate, regular rhythm, well-perfused extremities, 2+ b/l DP and radial pulses equal.  Lungs: BCTA, no increased WOB.  ABD: (+) Diffusely tender, no guarding or rebound, no pulsatile mass, no hernias.   MSK: Neck supple, nontender, nl ROM, no stepoff. Chest nontender. Back nontender in TLS spine or to b/l bony structures or flanks. Ext nontender, nl rom, no deformity.   INTEG: Skin warm, dry, no rash.  NEURO: A&Ox3, normal strength, nl sensation throughout, normal speech.   PSYCH: Calm, cooperative, normal affect and interaction.    Will obtain labs, CT abd/pelvis, IVF, pain control, re-eval.

## 2019-06-16 NOTE — H&P ADULT - HISTORY OF PRESENT ILLNESS
64 yo M with PMH of cerebral palsy from group home, seizure disorder, spastic paraplegia, s/p PEG tube, BPH, bladder neck stricture s/p suprapubic cath, asthma, nephrolithiasis, chronic constipation was sent in from group home for suprapubic pain , also endorses 2 episodes of vomiting , pain was more lateralized to the R side . Denies any associated fever or chills , but notes that the catheter drainage might have been a little bloody . Patient is known to have nephrolithiasis , and recurrent UTI , S/P suprapubic catheter placement on June 3rd

## 2019-06-16 NOTE — ED PROVIDER NOTE - PHYSICAL EXAMINATION
Vital Signs: I have reviewed the initial vital signs.  Constitutional: NAD, well-nourished, appears stated age, no acute distress.  HEENT: Airway patent, moist MM, no erythema/swelling/deformity of oral structures. EOMI, PERRLA.  CV: borderline tachycardic rate, regular rhythm, well-perfused extremities, 2+ b/l DP and radial pulses equal.  Lungs: BCTA, no increased WOB.  ABD: discomfort to suprapubic palptation, otherwise NTND, no guarding or rebound, no pulsatile mass, no hernias.   MSK: Neck supple, nontender, immobile, no stepoff. Chest nontender. Back nontender in TLS spine or to b/l bony structures or flanks. Ext nontender, with significant contracture, immobile  INTEG: Skin warm, dry, no rash.  NEURO: A&Ox3, extremities atrophic and immobile, slurred speech  PSYCH: Calm, cooperative, normal affect and interaction.

## 2019-06-16 NOTE — H&P ADULT - NSHPPHYSICALEXAM_GEN_ALL_CORE
T(C): 36.3 (06-15-19 @ 23:19), Max: 36.3 (06-15-19 @ 23:19)  HR: 88 (06-16-19 @ 06:53) (88 - 108)  BP: 154/89 (06-16-19 @ 06:53) (130/88 - 154/89)  RR: 20 (06-15-19 @ 23:19) (20 - 20)  SpO2: 96% (06-15-19 @ 23:19) (96% - 96%)    PHYSICAL EXAM:  GENERAL: NAD, well-developed  NECK: Supple, No JVD  CHEST/LUNG: Clear to auscultation bilaterally; No wheeze  HEART: Regular rate and rhythm; No murmurs, rubs, or gallops  ABDOMEN: Soft, Nontender, Nondistended; Bowel sounds present  EXTREMITIES:  2+ Peripheral Pulses, No clubbing, cyanosis, or edema

## 2019-06-16 NOTE — ED ADULT NURSE NOTE - NSIMPLEMENTINTERV_GEN_ALL_ED
Implemented All Fall Risk Interventions:  [place] to call system. Call bell, personal items and telephone within reach. Instruct patient to call for assistance. Room bathroom lighting operational. Non-slip footwear when patient is off stretcher. Physically safe environment: no spills, clutter or unnecessary equipment. Stretcher in lowest position, wheels locked, appropriate side rails in place. Provide visual cue, wrist band, yellow gown, etc. Monitor gait and stability. Monitor for mental status changes and reorient to person, place, and time. Review medications for side effects contributing to fall risk. Reinforce activity limits and safety measures with patient and family.

## 2019-06-16 NOTE — H&P ADULT - ATTENDING COMMENTS
64yo with PMH of cerebral palsy from group home, seizure disorder, spastic paraplegia, s/p PEG tube, BPH, bladder neck stricture s/p suprapubic cath, asthma, nephrolithiasis, chronic constipation, presenting for suprapubic pain , found to have mild left hydronephrosis, along with displaced SPC. Found to have a positive UA as well.    #Suspected CAUTI  On 6/3/19, patient was admitted for a clogged SPC that was changed. From 6/3 Urine culture growing morganella morganii (pansensitive), but patient was never treated.   Current suprapubic pain could be d/t the dislodged SPC (tip was in the Left UVJ) or a possible CAUTI (i/v/o once again positive UA and culture again growing GNR).   Will treat for the CAUTI. IV CTX. Narrow when sensitivities back.   ID eval.   SPC was adjusted by Urology and working ok now.   Patient denies any more complaints    #Hypernatremia :   Increase free water flushes through PEG.     #Cerbral palsy and hx of seizures     Keep Phenobarbital     #Chronic constipation     Stool burden evident on Ct abdomen     But patient had a BM this morning     Will keep on Bowel regimen , Lactulose frequency increased     #Foreign body on abdominal Xray 2 weeks ago     Presumed to be in the small bowels     Not appreciated on Ct abdomen this time       #DVT px: Lovenox SC    #HANDOFF: Pending Urine cultures. Should be medically ready by wednesday.

## 2019-06-16 NOTE — ED PROVIDER NOTE - CLINICAL SUMMARY MEDICAL DECISION MAKING FREE TEXT BOX
Patient presented diffuse abdominal pain, tender on exam but no rebound, afebrile, HD stable. Obtained labs which were grossly unremarkable. CT abd/pelvis initially read as (+) impacted stool in the rectum but otherwise unremarkable. Attempted bedside disimpaction but liquidy stool came out, no impaction appreciated. Overread of CT showed displaced suprapubic catheter (this was endorsed to MAR after admission and was not endorsed to ED team), which may be causing the pain. UA also showed (+) UTI which was tx with abx. Attempted to control pain in ED without improvement. Patient initially admitted for intractable pain but in retrospect with this overread, patient will require admission for possible replacement of suprapubic catheter. Patient agreeable with plan. HD stable at time of admission.

## 2019-06-16 NOTE — ED PROVIDER NOTE - CARE PLAN
Principal Discharge DX:	Intractable pain  Secondary Diagnosis:	UTI (urinary tract infection)  Secondary Diagnosis:	Abdominal pain Principal Discharge DX:	Suprapubic catheter dysfunction, initial encounter  Secondary Diagnosis:	UTI (urinary tract infection)  Secondary Diagnosis:	Abdominal pain

## 2019-06-16 NOTE — CONSULT NOTE ADULT - ATTENDING COMMENTS
Pt seen and examined at bedside  agree with above note  no signs of stone fragments  SPT recently changed, change be changed in another two weeks at nursing home

## 2019-06-16 NOTE — H&P ADULT - ASSESSMENT
62yo with above listed PMH presenting for suprapubic pain , found to have mild left hydronephrosis, along with displaced SPC     1)Dislodged SPC with left mild hydronephrosis     Urology evaluation  might need replacement     Will cover empirically for UTI     F/U repeat urine Cx    Pain control     2)Cerbral palsy and hx of seizures     Keep Phenobarbital     3)Chronic constipation     Stool burden evident on Ct abdomen     But patient had a BM this morning     Will keep on Bowel regimen , Lactulose frequency increased     4)Foreign body on abdominal Xray 2 weeks ago     Presumed to be in the small bowels     Not appreciated on Ct abdomen this time       #DVT px: Lovenox SC

## 2019-06-16 NOTE — ED PROVIDER NOTE - OBJECTIVE STATEMENT
63yM with PMH CP, seizure disoder, paraplegia, asthma, kidney stones, s/p suprapubic cath and g tube p/w suprapubic abd pain X 1 day. no fever chills nausea diarrhea back pain, no erythema around catheter. SPT is draining well. pt unable to further characterize his pain 2/2 advanced medical illness.

## 2019-06-17 LAB
ALBUMIN SERPL ELPH-MCNC: 3.5 G/DL — SIGNIFICANT CHANGE UP (ref 3.5–5.2)
ALP SERPL-CCNC: 82 U/L — SIGNIFICANT CHANGE UP (ref 30–115)
ALT FLD-CCNC: 13 U/L — SIGNIFICANT CHANGE UP (ref 0–41)
ANION GAP SERPL CALC-SCNC: 12 MMOL/L — SIGNIFICANT CHANGE UP (ref 7–14)
AST SERPL-CCNC: 18 U/L — SIGNIFICANT CHANGE UP (ref 0–41)
BASOPHILS # BLD AUTO: 0.04 K/UL — SIGNIFICANT CHANGE UP (ref 0–0.2)
BASOPHILS NFR BLD AUTO: 0.6 % — SIGNIFICANT CHANGE UP (ref 0–1)
BILIRUB SERPL-MCNC: <0.2 MG/DL — SIGNIFICANT CHANGE UP (ref 0.2–1.2)
BUN SERPL-MCNC: 20 MG/DL — SIGNIFICANT CHANGE UP (ref 10–20)
CALCIUM SERPL-MCNC: 8.9 MG/DL — SIGNIFICANT CHANGE UP (ref 8.5–10.1)
CHLORIDE SERPL-SCNC: 110 MMOL/L — SIGNIFICANT CHANGE UP (ref 98–110)
CO2 SERPL-SCNC: 27 MMOL/L — SIGNIFICANT CHANGE UP (ref 17–32)
CREAT SERPL-MCNC: 0.6 MG/DL — LOW (ref 0.7–1.5)
EOSINOPHIL # BLD AUTO: 0.17 K/UL — SIGNIFICANT CHANGE UP (ref 0–0.7)
EOSINOPHIL NFR BLD AUTO: 2.4 % — SIGNIFICANT CHANGE UP (ref 0–8)
GLUCOSE SERPL-MCNC: 99 MG/DL — SIGNIFICANT CHANGE UP (ref 70–99)
HCT VFR BLD CALC: 36.3 % — LOW (ref 42–52)
HGB BLD-MCNC: 11.9 G/DL — LOW (ref 14–18)
IMM GRANULOCYTES NFR BLD AUTO: 0.3 % — SIGNIFICANT CHANGE UP (ref 0.1–0.3)
LYMPHOCYTES # BLD AUTO: 1.32 K/UL — SIGNIFICANT CHANGE UP (ref 1.2–3.4)
LYMPHOCYTES # BLD AUTO: 18.8 % — LOW (ref 20.5–51.1)
MAGNESIUM SERPL-MCNC: 2.2 MG/DL — SIGNIFICANT CHANGE UP (ref 1.8–2.4)
MCHC RBC-ENTMCNC: 31 PG — SIGNIFICANT CHANGE UP (ref 27–31)
MCHC RBC-ENTMCNC: 32.8 G/DL — SIGNIFICANT CHANGE UP (ref 32–37)
MCV RBC AUTO: 94.5 FL — HIGH (ref 80–94)
MONOCYTES # BLD AUTO: 0.75 K/UL — HIGH (ref 0.1–0.6)
MONOCYTES NFR BLD AUTO: 10.7 % — HIGH (ref 1.7–9.3)
NEUTROPHILS # BLD AUTO: 4.73 K/UL — SIGNIFICANT CHANGE UP (ref 1.4–6.5)
NEUTROPHILS NFR BLD AUTO: 67.2 % — SIGNIFICANT CHANGE UP (ref 42.2–75.2)
NRBC # BLD: 0 /100 WBCS — SIGNIFICANT CHANGE UP (ref 0–0)
PLATELET # BLD AUTO: 192 K/UL — SIGNIFICANT CHANGE UP (ref 130–400)
POTASSIUM SERPL-MCNC: 4.3 MMOL/L — SIGNIFICANT CHANGE UP (ref 3.5–5)
POTASSIUM SERPL-SCNC: 4.3 MMOL/L — SIGNIFICANT CHANGE UP (ref 3.5–5)
PROT SERPL-MCNC: 6.3 G/DL — SIGNIFICANT CHANGE UP (ref 6–8)
RBC # BLD: 3.84 M/UL — LOW (ref 4.7–6.1)
RBC # FLD: 13.9 % — SIGNIFICANT CHANGE UP (ref 11.5–14.5)
SODIUM SERPL-SCNC: 149 MMOL/L — HIGH (ref 135–146)
WBC # BLD: 7.03 K/UL — SIGNIFICANT CHANGE UP (ref 4.8–10.8)
WBC # FLD AUTO: 7.03 K/UL — SIGNIFICANT CHANGE UP (ref 4.8–10.8)

## 2019-06-17 PROCEDURE — 99223 1ST HOSP IP/OBS HIGH 75: CPT | Mod: AI

## 2019-06-17 RX ORDER — CHLORHEXIDINE GLUCONATE 213 G/1000ML
1 SOLUTION TOPICAL
Refills: 0 | Status: DISCONTINUED | OUTPATIENT
Start: 2019-06-17 | End: 2019-06-20

## 2019-06-17 RX ORDER — ENOXAPARIN SODIUM 100 MG/ML
40 INJECTION SUBCUTANEOUS DAILY
Refills: 0 | Status: DISCONTINUED | OUTPATIENT
Start: 2019-06-17 | End: 2019-06-20

## 2019-06-17 RX ADMIN — Medication 64.8 MILLIGRAM(S): at 13:23

## 2019-06-17 RX ADMIN — SENNA PLUS 2 TABLET(S): 8.6 TABLET ORAL at 21:55

## 2019-06-17 RX ADMIN — CEFTRIAXONE 100 GRAM(S): 500 INJECTION, POWDER, FOR SOLUTION INTRAMUSCULAR; INTRAVENOUS at 17:20

## 2019-06-17 RX ADMIN — LACTULOSE 30 GRAM(S): 10 SOLUTION ORAL at 00:42

## 2019-06-17 RX ADMIN — Medication 10 MILLIGRAM(S): at 13:11

## 2019-06-17 RX ADMIN — Medication 1 APPLICATION(S): at 17:19

## 2019-06-17 RX ADMIN — Medication 100 MILLIGRAM(S): at 17:19

## 2019-06-17 RX ADMIN — Medication 10 MILLIGRAM(S): at 21:55

## 2019-06-17 RX ADMIN — Medication 100 MILLIGRAM(S): at 05:37

## 2019-06-17 RX ADMIN — Medication 1 APPLICATION(S): at 05:37

## 2019-06-17 RX ADMIN — ENOXAPARIN SODIUM 40 MILLIGRAM(S): 100 INJECTION SUBCUTANEOUS at 11:09

## 2019-06-17 RX ADMIN — Medication 10 MILLIGRAM(S): at 05:37

## 2019-06-17 RX ADMIN — POLYETHYLENE GLYCOL 3350 17 GRAM(S): 17 POWDER, FOR SOLUTION ORAL at 11:09

## 2019-06-17 NOTE — PROGRESS NOTE ADULT - SUBJECTIVE AND OBJECTIVE BOX
SUBJECTIVE:    Patient is a 63y old Male who presents with a chief complaint of suprapubic pain (2019 13:27)      HPI:  64 yo M with PMH of cerebral palsy from group home, seizure disorder, spastic paraplegia, s/p PEG tube, BPH, bladder neck stricture s/p suprapubic cath, asthma, nephrolithiasis, chronic constipation was sent in from group home for suprapubic pain , also endorses 2 episodes of vomiting , pain was more lateralized to the R side . Denies any associated fever or chills , but notes that the catheter drainage might have been a little bloody . Patient is known to have nephrolithiasis , and recurrent UTI , S/P suprapubic catheter placement on  (2019 09:38)      Currently admitted to medicine with the primary diagnosis of Suprapubic catheter dysfunction, initial encounter     patient not endorsing any pain, aid at bedside states that he looks more comfortable today than in previous days    Besides the pertinent positives and negatives described above, the ROS was within normal limits.    PAST MEDICAL & SURGICAL HISTORY  Asthma  Urinary retention  Urinary calculi  Spastic quadriplegia  Osteoporosis  Seizure: last seizure &gt;10 years ago  Cerebral palsy  BPH (benign prostatic hyperplasia)  Suprapubic catheter  H/O cystoscopy  S/P percutaneous endoscopic gastrostomy (PEG) tube placement    SOCIAL HISTORY:    ALLERGIES:  No Known Allergies    MEDICATIONS:  STANDING MEDICATIONS  baclofen 10 milliGRAM(s) Oral every 8 hours  cefTRIAXone   IVPB 1 Gram(s) IV Intermittent every 24 hours  chlorhexidine 4% Liquid 1 Application(s) Topical <User Schedule>  docusate sodium Liquid 100 milliGRAM(s) Oral two times a day  PHENobarbital 64.8 milliGRAM(s) Oral daily  polyethylene glycol 3350 17 Gram(s) Oral daily  senna 2 Tablet(s) Oral at bedtime  vitamin A &amp; D Ointment 1 Application(s) Topical two times a day    PRN MEDICATIONS  acetaminophen    Suspension .. 650 milliGRAM(s) Oral every 4 hours PRN  ALBUTerol    90 MICROgram(s) HFA Inhaler 2 Puff(s) Inhalation every 6 hours PRN  guaiFENesin    Syrup 100 milliGRAM(s) Oral every 6 hours PRN  morphine  - Injectable 4 milliGRAM(s) IV Push every 4 hours PRN    VITALS:   T(F): 97.9  HR: 70  BP: 93/57  RR: 18  SpO2: 95%    LABS:                        11.9   7.03  )-----------( 192      ( 2019 05:25 )             36.3     06-17    149<H>  |  110  |  20  ----------------------------<  99  4.3   |  27  |  0.6<L>    Ca    8.9      2019 05:25  Mg     2.2     -    TPro  6.3  /  Alb  3.5  /  TBili  <0.2  /  DBili  x   /  AST  18  /  ALT  13  /  AlkPhos  82  -17    PT/INR - ( 15 Kendrick 2019 23:55 )   PT: 12.50 sec;   INR: 1.09 ratio         PTT - ( 15 Kendrick 2019 23:55 )  PTT:30.8 sec  Urinalysis Basic - ( 15 Kendrick 2019 23:55 )    Color: Yellow / Appearance: Cloudy / S.015 / pH: x  Gluc: x / Ketone: Negative  / Bili: Negative / Urobili: 0.2 mg/dL   Blood: x / Protein: 100 mg/dL / Nitrite: Negative   Leuk Esterase: Large / RBC: 3-5 /HPF / WBC 6-10 /HPF   Sq Epi: x / Non Sq Epi: Few /HPF / Bacteria: Many /HPF      RADIOLOGY:    PHYSICAL EXAM:  GEN: No acute distress  LUNGS: Clear to auscultation bilaterally   HEART: Regular  ABD: Soft, non-tender, non-distended, PEG tube and suprapubic catheter  EXT: NC/NC/NE/2+PP/REBOLLAR/Skin Intact.   NEURO: AAOX2 at baseline    Intravenous access: yes  pt has PEG tube  pt has suprapubic catheter

## 2019-06-17 NOTE — PROGRESS NOTE ADULT - ASSESSMENT
63 yoM with CP, PEG tube, and suprapubic catheter due to bladder neck stricture 63 yoM with CP, PEG tube, and suprapubic catheter due to bladder neck stricture and BPH presents with suprapubic pain 63 yoM with CP, PEG tube, and suprapubic catheter due to bladder neck stricture and BPH presents with suprapubic pain secondary to UTI vs. trauma from suprapubic cath    # Suprapubic pain, UTI vs. trauma  -UA is positive but no leukocytosis or fever / last admission urine grew morganella pan sensitive so treating empirically with ceftriaxone  -CT abd pelvis shows new left sided hydro, possible stone as per uro / uro readjusted catheter but no intervention necessary  -as per aid patient looks more comfortable today, patient is not endorsing any pain, no gross blood in alatorre    # Hx of seizures  -phenobarbital 64.8 mg qd  -stable    # Spastic paraplegia  -baclofen 10 q8h  -stable    # Constipation  -docusate 100 bid / senna 2 tabs/ miralax qd    PLAN: f/u urine cultures, poss discharge tomorrow to group home    DVT PPX: lovenox 40  GI PPX: not indicated  Diet: PEG tube feeds Jevity 1.2

## 2019-06-18 LAB
ALBUMIN SERPL ELPH-MCNC: 3.5 G/DL — SIGNIFICANT CHANGE UP (ref 3.5–5.2)
ALP SERPL-CCNC: 79 U/L — SIGNIFICANT CHANGE UP (ref 30–115)
ALT FLD-CCNC: 13 U/L — SIGNIFICANT CHANGE UP (ref 0–41)
ANION GAP SERPL CALC-SCNC: 11 MMOL/L — SIGNIFICANT CHANGE UP (ref 7–14)
AST SERPL-CCNC: 19 U/L — SIGNIFICANT CHANGE UP (ref 0–41)
BASOPHILS # BLD AUTO: 0.03 K/UL — SIGNIFICANT CHANGE UP (ref 0–0.2)
BASOPHILS NFR BLD AUTO: 0.7 % — SIGNIFICANT CHANGE UP (ref 0–1)
BILIRUB SERPL-MCNC: <0.2 MG/DL — SIGNIFICANT CHANGE UP (ref 0.2–1.2)
BUN SERPL-MCNC: 20 MG/DL — SIGNIFICANT CHANGE UP (ref 10–20)
CALCIUM SERPL-MCNC: 8.9 MG/DL — SIGNIFICANT CHANGE UP (ref 8.5–10.1)
CHLORIDE SERPL-SCNC: 109 MMOL/L — SIGNIFICANT CHANGE UP (ref 98–110)
CO2 SERPL-SCNC: 26 MMOL/L — SIGNIFICANT CHANGE UP (ref 17–32)
CREAT SERPL-MCNC: 0.5 MG/DL — LOW (ref 0.7–1.5)
EOSINOPHIL # BLD AUTO: 0.33 K/UL — SIGNIFICANT CHANGE UP (ref 0–0.7)
EOSINOPHIL NFR BLD AUTO: 8 % — SIGNIFICANT CHANGE UP (ref 0–8)
GLUCOSE SERPL-MCNC: 91 MG/DL — SIGNIFICANT CHANGE UP (ref 70–99)
HCT VFR BLD CALC: 37.2 % — LOW (ref 42–52)
HGB BLD-MCNC: 12.2 G/DL — LOW (ref 14–18)
IMM GRANULOCYTES NFR BLD AUTO: 0 % — LOW (ref 0.1–0.3)
LYMPHOCYTES # BLD AUTO: 1.2 K/UL — SIGNIFICANT CHANGE UP (ref 1.2–3.4)
LYMPHOCYTES # BLD AUTO: 29.1 % — SIGNIFICANT CHANGE UP (ref 20.5–51.1)
MAGNESIUM SERPL-MCNC: 2.1 MG/DL — SIGNIFICANT CHANGE UP (ref 1.8–2.4)
MCHC RBC-ENTMCNC: 30.9 PG — SIGNIFICANT CHANGE UP (ref 27–31)
MCHC RBC-ENTMCNC: 32.8 G/DL — SIGNIFICANT CHANGE UP (ref 32–37)
MCV RBC AUTO: 94.2 FL — HIGH (ref 80–94)
MONOCYTES # BLD AUTO: 0.51 K/UL — SIGNIFICANT CHANGE UP (ref 0.1–0.6)
MONOCYTES NFR BLD AUTO: 12.3 % — HIGH (ref 1.7–9.3)
NEUTROPHILS # BLD AUTO: 2.06 K/UL — SIGNIFICANT CHANGE UP (ref 1.4–6.5)
NEUTROPHILS NFR BLD AUTO: 49.9 % — SIGNIFICANT CHANGE UP (ref 42.2–75.2)
NRBC # BLD: 0 /100 WBCS — SIGNIFICANT CHANGE UP (ref 0–0)
PLATELET # BLD AUTO: 181 K/UL — SIGNIFICANT CHANGE UP (ref 130–400)
POTASSIUM SERPL-MCNC: 4.1 MMOL/L — SIGNIFICANT CHANGE UP (ref 3.5–5)
POTASSIUM SERPL-SCNC: 4.1 MMOL/L — SIGNIFICANT CHANGE UP (ref 3.5–5)
PROT SERPL-MCNC: 6.5 G/DL — SIGNIFICANT CHANGE UP (ref 6–8)
RBC # BLD: 3.95 M/UL — LOW (ref 4.7–6.1)
RBC # FLD: 13.5 % — SIGNIFICANT CHANGE UP (ref 11.5–14.5)
SODIUM SERPL-SCNC: 146 MMOL/L — SIGNIFICANT CHANGE UP (ref 135–146)
WBC # BLD: 4.13 K/UL — LOW (ref 4.8–10.8)
WBC # FLD AUTO: 4.13 K/UL — LOW (ref 4.8–10.8)

## 2019-06-18 PROCEDURE — 99233 SBSQ HOSP IP/OBS HIGH 50: CPT

## 2019-06-18 RX ADMIN — ENOXAPARIN SODIUM 40 MILLIGRAM(S): 100 INJECTION SUBCUTANEOUS at 12:22

## 2019-06-18 RX ADMIN — CEFTRIAXONE 100 GRAM(S): 500 INJECTION, POWDER, FOR SOLUTION INTRAMUSCULAR; INTRAVENOUS at 17:54

## 2019-06-18 RX ADMIN — SENNA PLUS 2 TABLET(S): 8.6 TABLET ORAL at 21:25

## 2019-06-18 RX ADMIN — Medication 10 MILLIGRAM(S): at 06:02

## 2019-06-18 RX ADMIN — Medication 10 MILLIGRAM(S): at 21:25

## 2019-06-18 RX ADMIN — Medication 100 MILLIGRAM(S): at 06:02

## 2019-06-18 RX ADMIN — Medication 10 MILLIGRAM(S): at 13:14

## 2019-06-18 RX ADMIN — Medication 1 APPLICATION(S): at 06:03

## 2019-06-18 RX ADMIN — Medication 100 MILLIGRAM(S): at 17:52

## 2019-06-18 RX ADMIN — POLYETHYLENE GLYCOL 3350 17 GRAM(S): 17 POWDER, FOR SOLUTION ORAL at 12:22

## 2019-06-18 RX ADMIN — Medication 64.8 MILLIGRAM(S): at 12:21

## 2019-06-18 RX ADMIN — CHLORHEXIDINE GLUCONATE 1 APPLICATION(S): 213 SOLUTION TOPICAL at 06:02

## 2019-06-18 RX ADMIN — Medication 1 APPLICATION(S): at 17:52

## 2019-06-18 NOTE — CONSULT NOTE ADULT - SUBJECTIVE AND OBJECTIVE BOX
62 yo M with PMH of cerebral palsy from group home, seizure disorder, spastic paraplegia, s/p PEG tube, BPH, bladder neck stricture s/p suprapubic cath, asthma, nephrolithiasis, chronic constipation was sent in from group home for suprapubic pain , also endorses 2 episodes of vomiting , pain was more lateralized to the R side . Denies any associated fever or chills , but notes that the catheter drainage might have been a little bloody . Patient is known to have nephrolithiasis , and recurrent UTI , S/P suprapubic catheter placement on  by     PAST MEDICAL & SURGICAL HISTORY:  Asthma  Urinary retention  Urinary calculi  Spastic quadriplegia  Osteoporosis  Seizure: last seizure &gt;10 years ago  Cerebral palsy  BPH (benign prostatic hyperplasia)  Suprapubic catheter  H/O cystoscopy  S/P percutaneous endoscopic gastrostomy (PEG) tube placement    MEDICATIONS  (STANDING):  baclofen 10 milliGRAM(s) Oral every 8 hours  cefTRIAXone   IVPB 1 Gram(s) IV Intermittent every 24 hours  docusate sodium Liquid 100 milliGRAM(s) Oral two times a day  lactulose Syrup 30 Gram(s) Oral every 6 hours  PHENobarbital 64.8 milliGRAM(s) Oral daily  polyethylene glycol 3350 17 Gram(s) Oral daily  senna 2 Tablet(s) Oral at bedtime  vitamin A &amp; D Ointment 1 Application(s) Topical two times a day    Allergies    No Known Allergies    Intolerances    SHx - lives in group home    FHx - NC    Vital Signs Last 24 Hrs  T(C): 36.3 (15 Kendrick 2019 23:19), Max: 36.3 (15 Kendrick 2019 23:19)  T(F): 97.4 (15 Kendrick 2019 23:19), Max: 97.4 (15 Kendrick 2019 23:19)  HR: 88 (2019 06:53) (88 - 108)  BP: 154/89 (2019 06:53) (130/88 - 154/89)  RR: 20 (15 Kendrick 2019 23:19) (20 - 20)  SpO2: 96% (15 Kendrick 2019 23:19) (96% - 96%)      pt seen and examined at bedside  a+ox3, nad, non toxic  abd - protuberant, nttp, sp cystotomy looks well healed, 20 fr catheter draining yellow urine with (chronic) white sediment, irrigates well  gu - no flank or cvat                           14.2   11.03 )-----------( 207      ( 15 Kendrick 2019 23:55 )             41.8   06-15    144  |  105  |  23<H>  ----------------------------<  165<H>  3.7   |  24  |  0.6<L>    Ca    9.4      15 Kendrick 2019 23:55    TPro  7.6  /  Alb  4.2  /  TBili  0.2  /  DBili  x   /  AST  20  /  ALT  16  /  AlkPhos  100  06-15    Urinalysis Basic - ( 15 Kendrick 2019 23:55 )    Color: Yellow / Appearance: Cloudy / S.015 / pH: x  Gluc: x / Ketone: Negative  / Bili: Negative / Urobili: 0.2 mg/dL   Blood: x / Protein: 100 mg/dL / Nitrite: Negative   Leuk Esterase: Large / RBC: 3-5 /HPF / WBC 6-10 /HPF   Sq Epi: x / Non Sq Epi: Few /HPF / Bacteria: Many /HPF    Culture - Urine (19 @ 13:40)    -  Tigecycline: R <=2    -  Tobramycin: S <=4    -  Trimethoprim/Sulfamethoxazole: S <=2/38    -  Meropenem: S <=1    -  Nitrofurantoin: R >64 Should not be used to treat pyelonephritis    -  Piperacillin/Tazobactam: S <=16    -  Levofloxacin: S <=2    -  Imipenem: R 4    -  Ertapenem: S <=1    -  Gentamicin: S <=4    -  Ciprofloxacin: S <=1    -  Ceftriaxone: S <=1 Enterobacter, Citrobacter, and Serratia may develop resistance during prolonged therapy    -  Cefoxitin: S <=8    -  Cefepime: S <=4    -  Cefazolin: R >16    -  Aztreonam: S <=4    -  Ampicillin/Sulbactam: I 16/8    -  Amikacin: S <=16    -  Ampicillin: R >16 These ampicillin results predict results for amoxicillin    Specimen Source: .Urine Catheterized    Culture Results:   >100,000 CFU/ml Morganella morganii    Organism Identification: Morganella morganii    Organism: Morganella morganii    Method Type: MIGUEL A    < from: CT Abdomen and Pelvis w/ IV Cont (19 @ 03:34) >  KIDNEYS AND BLADDER: Interval removal of right percutaneous   nephroureterostomy tube. No right-sided hydronephrosis. Nonobstructing   right renal calculi versus early excretion of contrast.  New mild left   hydronephrosis with periureteral inflammation to the level of the urinary   bladder without definitive evidence for obstructing calculus. Interval   change of a suprapubic Edouard catheter. The tip of the Edouard catheter   appears to terminate in the region of the left distal ureter/UVJ (series   5, image 341). Urinary bladder wall thickening and mucosal   hyperenhancement. Overall urinary bladder wall thickening does not appear   increased from prior study.    ABDOMINOPELVIC NODES: Unremarkable.    PELVIC ORGANS: Prostate gland appears normal in size.
HAWA TISH  63y, Male  Allergy: No Known Allergies      CHIEF COMPLAINT: suprapubic pain (17 Jun 2019 08:31)      HPI:  62 yo M with PMH of cerebral palsy from group home, seizure disorder, spastic paraplegia, s/p PEG tube, BPH, bladder neck stricture s/p suprapubic cath, asthma, nephrolithiasis, chronic constipation was sent in from group home for suprapubic pain , also endorses 2 episodes of vomiting , pain was more lateralized to the R side . Denies any associated fever or chills , but notes that the catheter drainage might have been a little bloody . Patient is known to have nephrolithiasis , and recurrent UTI , S/P suprapubic catheter placement on June 3rd (16 Jun 2019 09:38)    His attendant reports that he has some cough this AM.  UCX GNR    FAMILY HISTORY:  Family history unknown    PAST MEDICAL & SURGICAL HISTORY:  Asthma  Urinary retention  Urinary calculi  Spastic quadriplegia  Osteoporosis  Seizure: last seizure &gt;10 years ago  Cerebral palsy  BPH (benign prostatic hyperplasia)  Suprapubic catheter  H/O cystoscopy  S/P percutaneous endoscopic gastrostomy (PEG) tube placement      SOCIAL HISTORY    Substance Use ( x ) never used  (  ) IVDU (  ) Other:  Tobacco Usage:  (x   ) never smoked   (   ) former smoker   (   ) current smoker   Alcohol Usage: (   ) social  (   ) daily use ( x  ) denies  Sexual History: NA      ROS  General: Denies fevers, chills, nightsweats, weight loss  HEENT: Denies headache, rhinorrhea, sore throat, eye pain  CV: Denies CP, palpitations  PULM: Denies SOB, +cough  GI: Denies abdominal pain, diarrhea  : as noted above   MSK: Denies arthralgias  SKIN: Denies rash  NEURO: Denies paresthesias, weakness  PSYCH: Denies depression    VITALS:  T(F): 98.4, Max: 98.9 (06-17-19 @ 20:19)  HR: 66  BP: 95/52  RR: 18Vital Signs Last 24 Hrs  T(C): 36.9 (18 Jun 2019 04:44), Max: 37.2 (17 Jun 2019 20:19)  T(F): 98.4 (18 Jun 2019 04:44), Max: 98.9 (17 Jun 2019 20:19)  HR: 66 (18 Jun 2019 04:44) (66 - 96)  BP: 95/52 (18 Jun 2019 04:44) (95/52 - 128/73)  BP(mean): --  RR: 18 (18 Jun 2019 04:44) (18 - 18)  SpO2: --    PHYSICAL EXAM:  Gen: NAD, resting in bed  HEENT: Normocephalic, atraumatic  Neck: supple, no lymphadenopathy  CV: Regular rate & regular rhythm  Lungs: decreased BS at bases, no fremitus  Abdomen: Soft, BS present PEG, SPC clean  Ext: Warm, well perfused  Neuro: non focal, awake, contracted  Skin: no rash, no erythema  Lines: no phlebitis    TESTS & MEASUREMENTS:                        12.2   4.13  )-----------( 181      ( 18 Jun 2019 05:30 )             37.2     06-18    146  |  109  |  20  ----------------------------<  91  4.1   |  26  |  0.5<L>    Ca    8.9      18 Jun 2019 05:30  Mg     2.1     06-18    TPro  6.5  /  Alb  3.5  /  TBili  <0.2  /  DBili  x   /  AST  19  /  ALT  13  /  AlkPhos  79  06-18    eGFR if Non African American: 115 mL/min/1.73M2 (06-18-19 @ 05:30)  eGFR if African American: 134 mL/min/1.73M2 (06-18-19 @ 05:30)    LIVER FUNCTIONS - ( 18 Jun 2019 05:30 )  Alb: 3.5 g/dL / Pro: 6.5 g/dL / ALK PHOS: 79 U/L / ALT: 13 U/L / AST: 19 U/L / GGT: x               Culture - Urine (collected 06-16-19 @ 13:49)  Source: .Urine Suprapubic  Preliminary Report (06-17-19 @ 14:37):    >100,000 CFU/ml Gram Negative Rods        Lactate, Blood: 1.9 mmol/L (06-15-19 @ 23:55)      INFECTIOUS DISEASES TESTING      RADIOLOGY & ADDITIONAL TESTS:  I have personally reviewed the last Chest xray  CXR      CT  CT Abdomen and Pelvis w/ IV Cont:   EXAM:  CT ABDOMEN AND PELVIS IC            PROCEDURE DATE:  06/16/2019            INTERPRETATION:  CLINICAL HISTORY / REASON FOR EXAM: Suprapubic abdominal   pain.    TECHNIQUE: Contiguous axial CT images were obtained from the lower chest   to thepubic symphysis following administration of 100 mL Optiray 320   intravenous contrast. Oral contrast was not administered. Reformatted   images in the coronal and sagittal planes were acquired.    COMPARISON CT: CT abdomen and pelvis from April 24, 2019      FINDINGS:    LOWER CHEST: Bibasilar subsegmental atelectasis.    HEPATOBILIARY: Unremarkable.    SPLEEN: Unremarkable.    PANCREAS: Unremarkable.    ADRENAL GLANDS: Unremarkable.    KIDNEYS AND BLADDER: Interval removal of right percutaneous   nephroureterostomy tube. No right-sided hydronephrosis. Nonobstructing   right renal calculi versus early excretion of contrast.  New mild left   hydronephrosis with periureteral inflammation to the level of the urinary   bladder without definitive evidence for obstructing calculus. Interval   change of a suprapubic Edouard catheter. The tip of the Edouard catheter   appears to terminate in the region of the left distal ureter/UVJ (series   5, image 341). Urinary bladder wall thickening and mucosal   hyperenhancement. Overall urinary bladder wall thickening does not appear   increased from prior study.    ABDOMINOPELVIC NODES: Unremarkable.    PELVIC ORGANS: Prostate gland appears normal in size.    PERITONEUM/MESENTERY/BOWEL: Moderate to large stool in the rectum.   Gastrostomy tube with bulb within gastric lumen. No bowel obstruction,   ascites or intraperitoneal free air. Normal caliber appendix.    BONES/SOFT TISSUES: No acute osseous abnormality.    VASCULAR: Aorta is normal in caliber.      IMPRESSION:    Since April 24, 2019;    Interval removal of right percutaneous nephroureterostomy tube.    Interval change of suprapubic catheter.    New mild left hydronephrosis with periureteral inflammation to the level   of the urinary bladder without definite evidence for obstructing   calculus. *The tip of the suprapubic catheter appears at the orifice of   the distal left ureter/UVJ (series 5, image 342), possibly causing   obstruction*.    There is stable urinary bladder wall thickening. Mucosal hyperenhancement   is noted. Limited CT evaluation for urinary infection.     Moderate volume stool in the rectum.    *Findings discussed with the ER and MAR as a change from preliminary   findings.              ALBA BLANCHARD M.D., RESIDENTRADIOLOGIST  This document has been electronically signed.  BRI MCLEAN M.D., ATTENDING RADIOLOGIST  This document has been electronically signed. Jun 16 2019  9:32AM             (06-16-19 @ 03:34)      CARDIOLOGY TESTING      MEDICATIONS  baclofen 10  cefTRIAXone   IVPB 1  chlorhexidine 4% Liquid 1  docusate sodium Liquid 100  enoxaparin Injectable 40  PHENobarbital 64.8  polyethylene glycol 3350 17  senna 2  vitamin A &amp; D Ointment 1      ANTIBIOTICS:  cefTRIAXone   IVPB 1 Gram(s) IV Intermittent every 24 hours      No Known Allergies

## 2019-06-18 NOTE — DIETITIAN INITIAL EVALUATION ADULT. - ENERGY NEEDS
estimated calorie needs: 1440 - 1560 kcals/day (MSJ x 1.2 - 1.3 AF)  estimated protein needs: 54 - 65 gms/day (1.0 - 1.2 gm/kg CBW)  estimated fluid needs: 1ml/kcal or per LIP

## 2019-06-18 NOTE — PROGRESS NOTE ADULT - ASSESSMENT
63 yoM with CP, PEG tube, and suprapubic catheter due to bladder neck stricture and BPH presents with suprapubic pain secondary to UTI vs. trauma from suprapubic cath, on ceftriaxone for presumed morganella, awaiting cultures    # Suprapubic pain, UTI vs. trauma  -UA is positive but no leukocytosis or fever / last admission urine grew morganella pan sensitive so treating empirically with ceftriaxone awaiting urine culture and sensitivities  -CT abd pelvis shows new left sided hydro, possible stone as per uro / uro readjusted catheter but no intervention necessary  -patient looks more comfortable today, patient is not endorsing any pain, no gross blood in alatorre    # Hx of seizures  -phenobarbital 64.8 mg qd  -stable    # Spastic paraplegia  -baclofen 10 q8h  -stable    # Constipation  -docusate 100 bid / senna 2 tabs/ miralax qd    PLAN: f/u urine cultures, poss discharge tomorrow to group home    DVT PPX: lovenox 40  GI PPX: not indicated  Diet: PEG tube feeds Jevity 1.2

## 2019-06-18 NOTE — DIETITIAN INITIAL EVALUATION ADULT. - OTHER INFO
Reason for RD assessment: TF PTA based on RD screen. Pt adm for suprapubic pain, UTI vs. trauma. Spastic paraplegia stable. Constipation on bowel regimen.

## 2019-06-18 NOTE — CONSULT NOTE ADULT - ASSESSMENT
64 yo male well known to , pt admitted with abdominal pain    CT a/p shows left, new, mild hydronephrosis down to bladder, may be due to recently passed stone ??    CT a/p also shows the tip of the catheter in the region of the left uvj, catheter repositioned and irrigated    spt changed by  6/3/19    plan  -no acute gu intervention warranted at this time    will d/w Dr. Joyner    case d/w pt's sister Leslie
ASSESSMENT  64 yo M with PMH of cerebral palsy from group home, seizure disorder, spastic paraplegia, s/p PEG tube, BPH, bladder neck stricture s/p suprapubic cath, asthma, nephrolithiasis, chronic constipation was sent in from group home for suprapubic pain and emesis      PROBLEMS  #Complicated cystitis, no e/o pyelonephritis    Sepsis ruled out on admission WBC 11     SPC changed 6/3    UCX GNR, BCX NG    CT AP New mild left hydronephrosis with periureteral inflammation to the level of the urinary bladder without definite evidence for obstructing calculus. *The tip of the suprapubic catheter appears at the orifice of the distal left ureter/UVJ (series 5, image 342), possibly causing obstruction*.    Most recent UCX morganella, HX ESBL Proteus in urine  #Atelectasis on CT    RECOMMENDATIONS  - F/u UCX GNR  - Ceftriaxone 1g q24h as improving, if recurrent morganella would change to PO cipro as has amp-C inducible resistance to cephalosporins   - If continued cough, recommend CXR     Spectra [phone]

## 2019-06-18 NOTE — DIETITIAN INITIAL EVALUATION ADULT. - MD RECOMMEND
Please change TF order to Jevity 1.2- 240ml q 4 hrs ( hold 2am feeds = 5 feeds/day) provides 1425 kcals, 66 gms protein, 972 ml free H2O (91% est energy needs, 102% of est protein needs) with additional fluids per LIP

## 2019-06-18 NOTE — DIETITIAN INITIAL EVALUATION ADULT. - SOURCE
other (specify)/spoke with pt's aide. Pt had vomited x3 few days PTA, but has subsided since. LBM 6/18 no GI distress. NKFA. Pt tolerating TF well./patient

## 2019-06-18 NOTE — PROGRESS NOTE ADULT - ATTENDING COMMENTS
62yo with PMH of cerebral palsy from group home, seizure disorder, spastic paraplegia, s/p PEG tube, BPH, bladder neck stricture s/p suprapubic cath, asthma, nephrolithiasis, chronic constipation, presenting for suprapubic pain , found to have mild left hydronephrosis, along with displaced SPC. Found to have a positive UA as well.    #Suspected CAUTI  On 6/3/19, patient was admitted for a clogged SPC that was changed. From 6/3 Urine culture growing morganella morganii (pansensitive), but patient was never treated.   Current suprapubic pain could be d/t the dislodged SPC (tip was in the Left UVJ) or a possible CAUTI (i/v/o once again positive UA and culture again growing GNR).   Will treat for the CAUTI. c/w IV CTX per ID. Narrow when sensitivities back.   SPC was adjusted by Urology and working ok now.   Patient denies any more complaints    #Cough:  Check CXR to rule out PNA.    #Hypernatremia :   Resolved with Increasing free water flushes through PEG.     #Cerbral palsy and hx of seizures     Keep Phenobarbital     #Chronic constipation     Stool burden evident on Ct abdomen     But patient having BM now     Will keep on Bowel regimen , Lactulose frequency increased     #Foreign body on abdominal Xray 2 weeks ago     Presumed to be in the small bowels     Not appreciated on Ct abdomen this time       #DVT px: Lovenox SC    #HANDOFF: Pending Urine cultures. Should be medically ready by wednesday.

## 2019-06-19 LAB
-  AMIKACIN: SIGNIFICANT CHANGE UP
-  AZTREONAM: SIGNIFICANT CHANGE UP
-  CEFEPIME: SIGNIFICANT CHANGE UP
-  CEFTAZIDIME: SIGNIFICANT CHANGE UP
-  CIPROFLOXACIN: SIGNIFICANT CHANGE UP
-  GENTAMICIN: SIGNIFICANT CHANGE UP
-  IMIPENEM: SIGNIFICANT CHANGE UP
-  LEVOFLOXACIN: SIGNIFICANT CHANGE UP
-  MEROPENEM: SIGNIFICANT CHANGE UP
-  PIPERACILLIN/TAZOBACTAM: SIGNIFICANT CHANGE UP
-  TOBRAMYCIN: SIGNIFICANT CHANGE UP
ALBUMIN SERPL ELPH-MCNC: 3.4 G/DL — LOW (ref 3.5–5.2)
ALP SERPL-CCNC: 78 U/L — SIGNIFICANT CHANGE UP (ref 30–115)
ALT FLD-CCNC: 14 U/L — SIGNIFICANT CHANGE UP (ref 0–41)
ANION GAP SERPL CALC-SCNC: 11 MMOL/L — SIGNIFICANT CHANGE UP (ref 7–14)
AST SERPL-CCNC: 19 U/L — SIGNIFICANT CHANGE UP (ref 0–41)
BASOPHILS # BLD AUTO: 0.03 K/UL — SIGNIFICANT CHANGE UP (ref 0–0.2)
BASOPHILS NFR BLD AUTO: 0.6 % — SIGNIFICANT CHANGE UP (ref 0–1)
BILIRUB SERPL-MCNC: <0.2 MG/DL — SIGNIFICANT CHANGE UP (ref 0.2–1.2)
BUN SERPL-MCNC: 17 MG/DL — SIGNIFICANT CHANGE UP (ref 10–20)
CALCIUM SERPL-MCNC: 9.1 MG/DL — SIGNIFICANT CHANGE UP (ref 8.5–10.1)
CHLORIDE SERPL-SCNC: 106 MMOL/L — SIGNIFICANT CHANGE UP (ref 98–110)
CO2 SERPL-SCNC: 25 MMOL/L — SIGNIFICANT CHANGE UP (ref 17–32)
CREAT SERPL-MCNC: 0.5 MG/DL — LOW (ref 0.7–1.5)
CULTURE RESULTS: SIGNIFICANT CHANGE UP
EOSINOPHIL # BLD AUTO: 0.52 K/UL — SIGNIFICANT CHANGE UP (ref 0–0.7)
EOSINOPHIL NFR BLD AUTO: 11.2 % — HIGH (ref 0–8)
GLUCOSE SERPL-MCNC: 84 MG/DL — SIGNIFICANT CHANGE UP (ref 70–99)
HCT VFR BLD CALC: 36.5 % — LOW (ref 42–52)
HGB BLD-MCNC: 12.2 G/DL — LOW (ref 14–18)
IMM GRANULOCYTES NFR BLD AUTO: 0.2 % — SIGNIFICANT CHANGE UP (ref 0.1–0.3)
LYMPHOCYTES # BLD AUTO: 1.48 K/UL — SIGNIFICANT CHANGE UP (ref 1.2–3.4)
LYMPHOCYTES # BLD AUTO: 31.9 % — SIGNIFICANT CHANGE UP (ref 20.5–51.1)
MAGNESIUM SERPL-MCNC: 2.1 MG/DL — SIGNIFICANT CHANGE UP (ref 1.8–2.4)
MCHC RBC-ENTMCNC: 30.8 PG — SIGNIFICANT CHANGE UP (ref 27–31)
MCHC RBC-ENTMCNC: 33.4 G/DL — SIGNIFICANT CHANGE UP (ref 32–37)
MCV RBC AUTO: 92.2 FL — SIGNIFICANT CHANGE UP (ref 80–94)
METHOD TYPE: SIGNIFICANT CHANGE UP
MONOCYTES # BLD AUTO: 0.55 K/UL — SIGNIFICANT CHANGE UP (ref 0.1–0.6)
MONOCYTES NFR BLD AUTO: 11.9 % — HIGH (ref 1.7–9.3)
NEUTROPHILS # BLD AUTO: 2.05 K/UL — SIGNIFICANT CHANGE UP (ref 1.4–6.5)
NEUTROPHILS NFR BLD AUTO: 44.2 % — SIGNIFICANT CHANGE UP (ref 42.2–75.2)
NRBC # BLD: 0 /100 WBCS — SIGNIFICANT CHANGE UP (ref 0–0)
ORGANISM # SPEC MICROSCOPIC CNT: SIGNIFICANT CHANGE UP
ORGANISM # SPEC MICROSCOPIC CNT: SIGNIFICANT CHANGE UP
PLATELET # BLD AUTO: 182 K/UL — SIGNIFICANT CHANGE UP (ref 130–400)
POTASSIUM SERPL-MCNC: 4 MMOL/L — SIGNIFICANT CHANGE UP (ref 3.5–5)
POTASSIUM SERPL-SCNC: 4 MMOL/L — SIGNIFICANT CHANGE UP (ref 3.5–5)
PROT SERPL-MCNC: 6.2 G/DL — SIGNIFICANT CHANGE UP (ref 6–8)
RBC # BLD: 3.96 M/UL — LOW (ref 4.7–6.1)
RBC # FLD: 13.2 % — SIGNIFICANT CHANGE UP (ref 11.5–14.5)
SODIUM SERPL-SCNC: 142 MMOL/L — SIGNIFICANT CHANGE UP (ref 135–146)
SPECIMEN SOURCE: SIGNIFICANT CHANGE UP
WBC # BLD: 4.64 K/UL — LOW (ref 4.8–10.8)
WBC # FLD AUTO: 4.64 K/UL — LOW (ref 4.8–10.8)

## 2019-06-19 PROCEDURE — 99233 SBSQ HOSP IP/OBS HIGH 50: CPT

## 2019-06-19 PROCEDURE — 99222 1ST HOSP IP/OBS MODERATE 55: CPT | Mod: 24

## 2019-06-19 PROCEDURE — 71045 X-RAY EXAM CHEST 1 VIEW: CPT | Mod: 26

## 2019-06-19 RX ORDER — NYSTATIN CREAM 100000 [USP'U]/G
1 CREAM TOPICAL
Refills: 0 | Status: DISCONTINUED | OUTPATIENT
Start: 2019-06-19 | End: 2019-06-20

## 2019-06-19 RX ADMIN — POLYETHYLENE GLYCOL 3350 17 GRAM(S): 17 POWDER, FOR SOLUTION ORAL at 11:27

## 2019-06-19 RX ADMIN — CEFTRIAXONE 100 GRAM(S): 500 INJECTION, POWDER, FOR SOLUTION INTRAMUSCULAR; INTRAVENOUS at 17:42

## 2019-06-19 RX ADMIN — Medication 10 MILLIGRAM(S): at 21:20

## 2019-06-19 RX ADMIN — ENOXAPARIN SODIUM 40 MILLIGRAM(S): 100 INJECTION SUBCUTANEOUS at 11:27

## 2019-06-19 RX ADMIN — Medication 1 APPLICATION(S): at 17:11

## 2019-06-19 RX ADMIN — Medication 10 MILLIGRAM(S): at 05:36

## 2019-06-19 RX ADMIN — Medication 100 MILLIGRAM(S): at 17:11

## 2019-06-19 RX ADMIN — Medication 10 MILLIGRAM(S): at 13:30

## 2019-06-19 RX ADMIN — Medication 64.8 MILLIGRAM(S): at 11:26

## 2019-06-19 RX ADMIN — NYSTATIN CREAM 1 APPLICATION(S): 100000 CREAM TOPICAL at 17:11

## 2019-06-19 RX ADMIN — CHLORHEXIDINE GLUCONATE 1 APPLICATION(S): 213 SOLUTION TOPICAL at 05:36

## 2019-06-19 RX ADMIN — Medication 100 MILLIGRAM(S): at 05:36

## 2019-06-19 RX ADMIN — SENNA PLUS 2 TABLET(S): 8.6 TABLET ORAL at 21:20

## 2019-06-19 RX ADMIN — Medication 1 APPLICATION(S): at 05:47

## 2019-06-19 NOTE — PROGRESS NOTE ADULT - SUBJECTIVE AND OBJECTIVE BOX
HAWATISH  63y, Male  Allergy: No Known Allergies      CHIEF COMPLAINT: suprapubic pain (18 Jun 2019 12:13)      INTERVAL EVENTS/HPI  - No acute events overnight  - T(F): , Max: 98.9 (06-18-19 @ 21:27)  - Denies any worsening symptoms  - Tolerating medication  - WBC Count: 4.64 K/uL (06-19-19 @ 04:53)      ROS  Negative except as per noted above in HPI  Denies cough  Denies diarrhea  Denies dysuria   Denies pain at any IV site     FH noncontributory    VITALS:  T(F): 96.1, Max: 98.9 (06-18-19 @ 21:27)  HR: 62  BP: 105/69  RR: 18Vital Signs Last 24 Hrs  T(C): 35.6 (19 Jun 2019 04:56), Max: 37.2 (18 Jun 2019 21:27)  T(F): 96.1 (19 Jun 2019 04:56), Max: 98.9 (18 Jun 2019 21:27)  HR: 62 (19 Jun 2019 04:56) (62 - 78)  BP: 105/69 (19 Jun 2019 04:56) (100/56 - 109/58)  BP(mean): --  RR: 18 (19 Jun 2019 04:56) (16 - 18)  SpO2: 96% (19 Jun 2019 08:42) (96% - 97%)    PHYSICAL EXAM:  Gen: NAD, resting in bed  HEENT: Normocephalic, atraumatic  Neck: supple, no lymphadenopathy  CV: Regular rate & regular rhythm  Lungs: decreased BS at bases, no fremitus  Abdomen: Soft, BS present PEG, SPC clean  Ext: Warm, well perfused  Neuro: non focal, awake, contracted  Skin: no rash, no erythema  Lines: no phlebitis        TESTS & MEASUREMENTS:                        12.2   4.64  )-----------( 182      ( 19 Jun 2019 04:53 )             36.5     06-19    142  |  106  |  17  ----------------------------<  84  4.0   |  25  |  0.5<L>    Ca    9.1      19 Jun 2019 04:53  Mg     2.1     06-19    TPro  6.2  /  Alb  3.4<L>  /  TBili  <0.2  /  DBili  x   /  AST  19  /  ALT  14  /  AlkPhos  78  06-19    eGFR if Non African American: 115 mL/min/1.73M2 (06-19-19 @ 04:53)  eGFR if African American: 134 mL/min/1.73M2 (06-19-19 @ 04:53)    LIVER FUNCTIONS - ( 19 Jun 2019 04:53 )  Alb: 3.4 g/dL / Pro: 6.2 g/dL / ALK PHOS: 78 U/L / ALT: 14 U/L / AST: 19 U/L / GGT: x               Culture - Urine (collected 06-16-19 @ 13:49)  Source: .Urine Suprapubic  Preliminary Report (06-17-19 @ 14:37):    >100,000 CFU/ml Gram Negative Rods        Lactate, Blood: 1.9 mmol/L (06-15-19 @ 23:55)      INFECTIOUS DISEASES TESTING      RADIOLOGY & ADDITIONAL TESTS:  I have personally reviewed the last Chest xray  CXR      CT      CARDIOLOGY TESTING      MEDICATIONS  baclofen 10  cefTRIAXone   IVPB 1  chlorhexidine 4% Liquid 1  docusate sodium Liquid 100  enoxaparin Injectable 40  nystatin Cream 1  PHENobarbital 64.8  polyethylene glycol 3350 17  senna 2  vitamin A &amp; D Ointment 1      ANTIBIOTICS:  cefTRIAXone   IVPB 1 Gram(s) IV Intermittent every 24 hours

## 2019-06-19 NOTE — PROGRESS NOTE ADULT - ASSESSMENT
62yo with PMH of cerebral palsy from group home, seizure disorder, spastic paraplegia, s/p PEG tube, BPH, bladder neck stricture s/p suprapubic cath, asthma, nephrolithiasis, chronic constipation, presenting for suprapubic pain , found to have mild left hydronephrosis, along with displaced SPC. Found to have a positive UA as well.    #Suspected CAUTI  On 6/3/19, patient was admitted for a clogged SPC that was changed. From 6/3 Urine culture growing morganella morganii (pansensitive), but patient was never treated.   Current suprapubic pain could be d/t the dislodged SPC (tip was in the Left UVJ) or a possible CAUTI (i/v/o once again positive UA and culture again growing GNR).   Will treat for the CAUTI. c/w IV CTX per ID. Narrow when sensitivities back.   Ask ID if we can use Bactrim instead of Cipro, if bacteria is sensitive to it, since Cipro might potentiate his seizure risk.    SPC was adjusted by Urology and working ok now.   Patient denies any more complaints    #Cough:  CXR says left base opacity but on recent CT abdo no pulmonary opacity, just the left hemidiaphragm is pushed up with stomach and splenic flexure of colon. Not coughing so much now, If coughs a lot again, may use vantin instead of the Bactrim for UTI ?     #Hypernatremia :   Resolved with Increasing free water flushes through PEG.     #Cerbral palsy and hx of seizures     Keep Phenobarbital     #Chronic constipation     Stool burden evident on Ct abdomen     But patient having BM now     Will keep on Bowel regimen , Lactulose frequency increased     #Foreign body on abdominal Xray 2 weeks ago     Presumed to be in the small bowels     Not appreciated on Ct abdomen this time       #DVT px: Lovenox SC    #HANDOFF: Pending Urine cultures. Should be medically ready by wednesday.

## 2019-06-19 NOTE — PROGRESS NOTE ADULT - ASSESSMENT
ASSESSMENT  62 yo M with PMH of cerebral palsy from group home, seizure disorder, spastic paraplegia, s/p PEG tube, BPH, bladder neck stricture s/p suprapubic cath, asthma, nephrolithiasis, chronic constipation was sent in from group home for suprapubic pain and emesis      PROBLEMS  #Complicated cystitis, no e/o pyelonephritis    Sepsis ruled out on admission WBC 11     SPC changed 6/3    UCX GNR, BCX NG    CT AP New mild left hydronephrosis with periureteral inflammation to the level of the urinary bladder without definite evidence for obstructing calculus. *The tip of the suprapubic catheter appears at the orifice of the distal left ureter/UVJ (series 5, image 342), possibly causing obstruction*.    Most recent UCX morganella, HX ESBL Proteus in urine  #Atelectasis on CT    RECOMMENDATIONS  - F/u UCX GNR  - Ceftriaxone 1g q24h as improving, if recurrent morganella would change to PO cipro as has amp-C inducible resistance to cephalosporins       Spectra [id number]

## 2019-06-19 NOTE — PROGRESS NOTE ADULT - SUBJECTIVE AND OBJECTIVE BOX
S : No new events/complaints  No CP/SOB      All other pertinent ROS negative.      06-18-19 @ 07:01  -  06-19-19 @ 07:00  --------------------------------------------------------  IN: 830 mL / OUT: 1200 mL / NET: -370 mL    06-19-19 @ 07:01  -  06-19-19 @ 17:34  --------------------------------------------------------  IN: 490 mL / OUT: 50 mL / NET: 440 mL      Vital Signs Last 24 Hrs  T(C): 36.1 (19 Jun 2019 14:17), Max: 37.2 (18 Jun 2019 21:27)  T(F): 97 (19 Jun 2019 14:17), Max: 98.9 (18 Jun 2019 21:27)  HR: 75 (19 Jun 2019 14:17) (62 - 75)  BP: 116/58 (19 Jun 2019 14:17) (100/56 - 116/58)  BP(mean): --  RR: 18 (19 Jun 2019 14:17) (18 - 18)  SpO2: 96% (19 Jun 2019 08:42) (96% - 97%)  PHYSICAL EXAM:    no change from prior    MEDICATIONS:  MEDICATIONS  (STANDING):  baclofen 10 milliGRAM(s) Oral every 8 hours  cefTRIAXone   IVPB 1 Gram(s) IV Intermittent every 24 hours  chlorhexidine 4% Liquid 1 Application(s) Topical <User Schedule>  docusate sodium Liquid 100 milliGRAM(s) Oral two times a day  enoxaparin Injectable 40 milliGRAM(s) SubCutaneous daily  nystatin Cream 1 Application(s) Topical two times a day  PHENobarbital 64.8 milliGRAM(s) Oral daily  polyethylene glycol 3350 17 Gram(s) Oral daily  senna 2 Tablet(s) Oral at bedtime  vitamin A &amp; D Ointment 1 Application(s) Topical two times a day      LABS: All Labs Reviewed:                        12.2   4.64  )-----------( 182      ( 19 Jun 2019 04:53 )             36.5     06-19    142  |  106  |  17  ----------------------------<  84  4.0   |  25  |  0.5<L>    Ca    9.1      19 Jun 2019 04:53  Mg     2.1     06-19    TPro  6.2  /  Alb  3.4<L>  /  TBili  <0.2  /  DBili  x   /  AST  19  /  ALT  14  /  AlkPhos  78  06-19          Blood Culture: 06-16 @ 13:49  Organism --  Gram Stain Blood -- Gram Stain --  Specimen Source .Urine Suprapubic  Culture-Blood --        Radiology: reviewed

## 2019-06-20 ENCOUNTER — TRANSCRIPTION ENCOUNTER (OUTPATIENT)
Age: 64
End: 2019-06-20

## 2019-06-20 VITALS
TEMPERATURE: 97 F | RESPIRATION RATE: 18 BRPM | SYSTOLIC BLOOD PRESSURE: 96 MMHG | HEART RATE: 78 BPM | DIASTOLIC BLOOD PRESSURE: 54 MMHG

## 2019-06-20 LAB
ALBUMIN SERPL ELPH-MCNC: 3.7 G/DL — SIGNIFICANT CHANGE UP (ref 3.5–5.2)
ALP SERPL-CCNC: 85 U/L — SIGNIFICANT CHANGE UP (ref 30–115)
ALT FLD-CCNC: 14 U/L — SIGNIFICANT CHANGE UP (ref 0–41)
ANION GAP SERPL CALC-SCNC: 9 MMOL/L — SIGNIFICANT CHANGE UP (ref 7–14)
AST SERPL-CCNC: 20 U/L — SIGNIFICANT CHANGE UP (ref 0–41)
BASOPHILS # BLD AUTO: 0.02 K/UL — SIGNIFICANT CHANGE UP (ref 0–0.2)
BASOPHILS NFR BLD AUTO: 0.5 % — SIGNIFICANT CHANGE UP (ref 0–1)
BILIRUB SERPL-MCNC: 0.2 MG/DL — SIGNIFICANT CHANGE UP (ref 0.2–1.2)
BUN SERPL-MCNC: 15 MG/DL — SIGNIFICANT CHANGE UP (ref 10–20)
CALCIUM SERPL-MCNC: 9.1 MG/DL — SIGNIFICANT CHANGE UP (ref 8.5–10.1)
CHLORIDE SERPL-SCNC: 104 MMOL/L — SIGNIFICANT CHANGE UP (ref 98–110)
CO2 SERPL-SCNC: 28 MMOL/L — SIGNIFICANT CHANGE UP (ref 17–32)
CREAT SERPL-MCNC: 0.6 MG/DL — LOW (ref 0.7–1.5)
EOSINOPHIL # BLD AUTO: 0.41 K/UL — SIGNIFICANT CHANGE UP (ref 0–0.7)
EOSINOPHIL NFR BLD AUTO: 9.8 % — HIGH (ref 0–8)
GLUCOSE SERPL-MCNC: 92 MG/DL — SIGNIFICANT CHANGE UP (ref 70–99)
HCT VFR BLD CALC: 40.3 % — LOW (ref 42–52)
HGB BLD-MCNC: 13.5 G/DL — LOW (ref 14–18)
IMM GRANULOCYTES NFR BLD AUTO: 0.2 % — SIGNIFICANT CHANGE UP (ref 0.1–0.3)
LYMPHOCYTES # BLD AUTO: 1.41 K/UL — SIGNIFICANT CHANGE UP (ref 1.2–3.4)
LYMPHOCYTES # BLD AUTO: 33.8 % — SIGNIFICANT CHANGE UP (ref 20.5–51.1)
MAGNESIUM SERPL-MCNC: 2.1 MG/DL — SIGNIFICANT CHANGE UP (ref 1.8–2.4)
MCHC RBC-ENTMCNC: 31 PG — SIGNIFICANT CHANGE UP (ref 27–31)
MCHC RBC-ENTMCNC: 33.5 G/DL — SIGNIFICANT CHANGE UP (ref 32–37)
MCV RBC AUTO: 92.4 FL — SIGNIFICANT CHANGE UP (ref 80–94)
MONOCYTES # BLD AUTO: 0.44 K/UL — SIGNIFICANT CHANGE UP (ref 0.1–0.6)
MONOCYTES NFR BLD AUTO: 10.6 % — HIGH (ref 1.7–9.3)
NEUTROPHILS # BLD AUTO: 1.88 K/UL — SIGNIFICANT CHANGE UP (ref 1.4–6.5)
NEUTROPHILS NFR BLD AUTO: 45.1 % — SIGNIFICANT CHANGE UP (ref 42.2–75.2)
NRBC # BLD: 0 /100 WBCS — SIGNIFICANT CHANGE UP (ref 0–0)
PLATELET # BLD AUTO: 208 K/UL — SIGNIFICANT CHANGE UP (ref 130–400)
POTASSIUM SERPL-MCNC: 4.6 MMOL/L — SIGNIFICANT CHANGE UP (ref 3.5–5)
POTASSIUM SERPL-SCNC: 4.6 MMOL/L — SIGNIFICANT CHANGE UP (ref 3.5–5)
PROT SERPL-MCNC: 6.7 G/DL — SIGNIFICANT CHANGE UP (ref 6–8)
RBC # BLD: 4.36 M/UL — LOW (ref 4.7–6.1)
RBC # FLD: 13.2 % — SIGNIFICANT CHANGE UP (ref 11.5–14.5)
SODIUM SERPL-SCNC: 141 MMOL/L — SIGNIFICANT CHANGE UP (ref 135–146)
WBC # BLD: 4.17 K/UL — LOW (ref 4.8–10.8)
WBC # FLD AUTO: 4.17 K/UL — LOW (ref 4.8–10.8)

## 2019-06-20 PROCEDURE — 99239 HOSP IP/OBS DSCHRG MGMT >30: CPT

## 2019-06-20 RX ORDER — CIPROFLOXACIN LACTATE 400MG/40ML
1 VIAL (ML) INTRAVENOUS
Qty: 10 | Refills: 0
Start: 2019-06-20 | End: 2019-06-24

## 2019-06-20 RX ADMIN — Medication 10 MILLIGRAM(S): at 05:43

## 2019-06-20 RX ADMIN — Medication 100 MILLIGRAM(S): at 05:43

## 2019-06-20 RX ADMIN — Medication 10 MILLIGRAM(S): at 13:13

## 2019-06-20 RX ADMIN — POLYETHYLENE GLYCOL 3350 17 GRAM(S): 17 POWDER, FOR SOLUTION ORAL at 12:06

## 2019-06-20 RX ADMIN — ENOXAPARIN SODIUM 40 MILLIGRAM(S): 100 INJECTION SUBCUTANEOUS at 12:05

## 2019-06-20 RX ADMIN — Medication 1 APPLICATION(S): at 05:43

## 2019-06-20 RX ADMIN — Medication 64.8 MILLIGRAM(S): at 12:05

## 2019-06-20 RX ADMIN — NYSTATIN CREAM 1 APPLICATION(S): 100000 CREAM TOPICAL at 05:44

## 2019-06-20 RX ADMIN — CHLORHEXIDINE GLUCONATE 1 APPLICATION(S): 213 SOLUTION TOPICAL at 05:43

## 2019-06-20 NOTE — PROGRESS NOTE ADULT - ASSESSMENT
ASSESSMENT  64 yo M with PMH of cerebral palsy from group home, seizure disorder, spastic paraplegia, s/p PEG tube, BPH, bladder neck stricture s/p suprapubic cath, asthma, nephrolithiasis, chronic constipation was sent in from group home for suprapubic pain and emesis      PROBLEMS  #Complicated cystitis, no e/o pyelonephritis    Sepsis ruled out on admission WBC 4.6    SPC changed 6/3    UCX GNR, BCX NG    CT AP New mild left hydronephrosis with periureteral inflammation to the level of the urinary bladder without definite evidence for obstructing calculus. *The tip of the suprapubic catheter appears at the orifice of the distal left ureter/UVJ (series 5, image 342), possibly causing obstruction*.    Most recent UCX morganella, HX ESBL Proteus in urine  #Atelectasis on CT    RECOMMENDATIONS  Po ( via g tube ) Ciprofloxacin 500 mg q12h for 10 days  D/c other ABx  recall prn please

## 2019-06-20 NOTE — DISCHARGE NOTE PROVIDER - HOSPITAL COURSE
64 yo M with PMH of cerebral palsy from group home, seizure disorder, spastic paraplegia, s/p PEG tube, BPH, bladder neck stricture s/p suprapubic cath, asthma, nephrolithiasis, chronic constipation was sent in from group home for suprapubic pain , also endorses 2 episodes of vomiting , pain was more lateralized to the R side . Denies any associated fever or chills , but notes that the catheter drainage might have been a little bloody . Patient is known to have nephrolithiasis , and recurrent UTI , S/P suprapubic catheter placement on June 3rd.        During his course here he had a urinalysis done which was positive and eventually grew pseudomonas, he was started on ceftriaxone empirically with symptomatic resolution and switched to cipro because the culture was pan sensitive.  He was seen by urology and the catheter was readjusted.  He will be discharged back to the group home. 64 yo M with PMH of cerebral palsy from group home, seizure disorder, spastic paraplegia, s/p PEG tube, BPH, bladder neck stricture s/p suprapubic cath, asthma, nephrolithiasis, chronic constipation was sent in from group home for suprapubic pain , also endorses 2 episodes of vomiting , pain was more lateralized to the R side . Denies any associated fever or chills , but notes that the catheter drainage might have been a little bloody . Patient is known to have nephrolithiasis , and recurrent UTI , S/P suprapubic catheter placement on June 3rd.        During his course here he had a urinalysis done which was positive and eventually grew pseudomonas, he was started on ceftriaxone empirically with symptomatic resolution and switched to cipro because the culture was pan sensitive.  He was seen by urology and the catheter was readjusted.  He will be discharged back to the group home.            Attending Note:    Patient seen and examined independently. I personally had a face-to-face encounter with the patient, examined the patient myself and reviewed the plan of care with the housestaff. Agree with resident's note but my note supersedes that of the resident in the matters hereby listed.     OK for d/c on PO Abx per ID.

## 2019-06-20 NOTE — DISCHARGE NOTE PROVIDER - NSDCCPCAREPLAN_GEN_ALL_CORE_FT
PRINCIPAL DISCHARGE DIAGNOSIS  Diagnosis: Urinary tract infection  Assessment and Plan of Treatment: Jenaro had a urinary tract infection which caused his symptoms.  He was started on antibiotics here, initialyl ceftriaxone and then changed to ciprofloxacin when the cultures came back.  He will continue on ciprofloxacin for 5 more days, 500 mg twice a day.  Urology also saw him here and readjusted his catheter.  Please follow up with his primary care physician. PRINCIPAL DISCHARGE DIAGNOSIS  Diagnosis: Urinary tract infection  Assessment and Plan of Treatment: Jenaro had a urinary tract infection which caused his symptoms.  He was started on antibiotics here, initialyl ceftriaxone and then changed to ciprofloxacin when the cultures came back.  He will continue on ciprofloxacin for 5 more days, 500 mg twice a day.  HIs symptoms have resolved.  Urology also saw him here and readjusted his catheter.  Please follow up with his primary care physician and urology contact information provided.

## 2019-06-20 NOTE — DISCHARGE NOTE PROVIDER - INSTRUCTIONS
Done here: NPO with tube feed, Jevity 1.2 stephanie total volume over 24 hours is 960 mL, Total volume of bolus 240 mL, bolus feed rate 240 mL/hr, bolus feed duration is 1 hour, bolus feed frequency is every 6 hours, feed start time was 10:20

## 2019-06-20 NOTE — DISCHARGE NOTE NURSING/CASE MANAGEMENT/SOCIAL WORK - NSDCDPATPORTLINK_GEN_ALL_CORE
You can access the [facility] Portal, offered by [facility], by registering with the following website: [email or web address]

## 2019-06-20 NOTE — DISCHARGE NOTE PROVIDER - CARE PROVIDER_API CALL
Solis Joyner)  [facility]  [address] [place] [address], [address]  [place] [address]  Phone: [phone]  Fax: [phone]  Follow Up Time: 1 week

## 2019-06-20 NOTE — PROGRESS NOTE ADULT - SUBJECTIVE AND OBJECTIVE BOX
HAWATISH  63y, Male      OVERNIGHT EVENTS:    no fevers, does respond  as per aide his usual self  NAD    VITALS:  T(F): 96.7, Max: 97.4 (06-19-19 @ 21:24)  HR: 56  BP: 102/56  RR: 18Vital Signs Last 24 Hrs  T(C): 35.9 (20 Jun 2019 04:48), Max: 36.3 (19 Jun 2019 21:24)  T(F): 96.7 (20 Jun 2019 04:48), Max: 97.4 (19 Jun 2019 21:24)  HR: 56 (20 Jun 2019 04:48) (56 - 75)  BP: 102/56 (20 Jun 2019 04:48) (102/56 - 116/58)  BP(mean): --  RR: 18 (20 Jun 2019 04:48) (18 - 18)  SpO2: 96% (20 Jun 2019 08:17) (96% - 97%)    TESTS & MEASUREMENTS:                        13.5   4.17  )-----------( 208      ( 20 Jun 2019 06:16 )             40.3     06-20    141  |  104  |  15  ----------------------------<  92  4.6   |  28  |  0.6<L>    Ca    9.1      20 Jun 2019 06:16  Mg     2.1     06-20    TPro  6.7  /  Alb  3.7  /  TBili  0.2  /  DBili  x   /  AST  20  /  ALT  14  /  AlkPhos  85  06-20    LIVER FUNCTIONS - ( 20 Jun 2019 06:16 )  Alb: 3.7 g/dL / Pro: 6.7 g/dL / ALK PHOS: 85 U/L / ALT: 14 U/L / AST: 20 U/L / GGT: x             Culture - Urine (collected 06-16-19 @ 13:49)  Source: .Urine Suprapubic  Final Report (06-19-19 @ 21:26):    >100,000 CFU/ml Pseudomonas aeruginosa  Organism: Pseudomonas aeruginosa (06-19-19 @ 21:26)  Organism: Pseudomonas aeruginosa (06-19-19 @ 21:26)      -  Amikacin: S <=16      -  Aztreonam: I 16      -  Cefepime: S <=4      -  Ceftazidime: S 4      -  Ciprofloxacin: S <=1      -  Gentamicin: S <=4      -  Imipenem: S <=1      -  Levofloxacin: S <=2      -  Meropenem: S <=1      -  Piperacillin/Tazobactam: S <=16      -  Tobramycin: S <=4      Method Type: MIGUEL A            RADIOLOGY & ADDITIONAL TESTS:    ANTIBIOTICS:  cefTRIAXone   IVPB 1 Gram(s) IV Intermittent every 24 hours

## 2019-06-25 DIAGNOSIS — G40.909 EPILEPSY, UNSPECIFIED, NOT INTRACTABLE, WITHOUT STATUS EPILEPTICUS: ICD-10-CM

## 2019-06-25 DIAGNOSIS — R05 COUGH: ICD-10-CM

## 2019-06-25 DIAGNOSIS — N32.0 BLADDER-NECK OBSTRUCTION: ICD-10-CM

## 2019-06-25 DIAGNOSIS — N40.0 BENIGN PROSTATIC HYPERPLASIA WITHOUT LOWER URINARY TRACT SYMPTOMS: ICD-10-CM

## 2019-06-25 DIAGNOSIS — J98.11 ATELECTASIS: ICD-10-CM

## 2019-06-25 DIAGNOSIS — N30.90 CYSTITIS, UNSPECIFIED WITHOUT HEMATURIA: ICD-10-CM

## 2019-06-25 DIAGNOSIS — G80.0 SPASTIC QUADRIPLEGIC CEREBRAL PALSY: ICD-10-CM
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
## 2019-06-25 DIAGNOSIS — Z93.1 GASTROSTOMY STATUS: ICD-10-CM

## 2019-06-25 DIAGNOSIS — K59.09 OTHER CONSTIPATION: ICD-10-CM

## 2019-06-25 DIAGNOSIS — N13.6 PYONEPHROSIS: ICD-10-CM

## 2019-06-25 DIAGNOSIS — T83.511A INFECTION AND INFLAMMATORY REACTION DUE TO INDWELLING URETHRAL CATHETER, INITIAL ENCOUNTER: ICD-10-CM

## 2019-06-25 DIAGNOSIS — E87.0 HYPEROSMOLALITY AND HYPERNATREMIA: ICD-10-CM

## 2019-06-25 DIAGNOSIS — N40.1 BENIGN PROSTATIC HYPERPLASIA WITH LOWER URINARY TRACT SYMPTOMS: ICD-10-CM

## 2019-06-25 DIAGNOSIS — R33.8 OTHER RETENTION OF URINE: ICD-10-CM

## 2019-06-25 DIAGNOSIS — R10.9 UNSPECIFIED ABDOMINAL PAIN: ICD-10-CM

## 2019-06-25 DIAGNOSIS — B96.5 PSEUDOMONAS (AERUGINOSA) (MALLEI) (PSEUDOMALLEI) AS THE CAUSE OF DISEASES CLASSIFIED ELSEWHERE: ICD-10-CM
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
## 2019-06-25 DIAGNOSIS — M81.0 AGE-RELATED OSTEOPOROSIS WITHOUT CURRENT PATHOLOGICAL FRACTURE: ICD-10-CM

## 2019-06-25 DIAGNOSIS — T83.028A DISPLACEMENT OF OTHER URINARY CATHETER, INITIAL ENCOUNTER: ICD-10-CM

## 2019-06-28 ENCOUNTER — OUTPATIENT (OUTPATIENT)
Dept: OUTPATIENT SERVICES | Facility: HOSPITAL | Age: 64
LOS: 1 days | Discharge: HOME | End: 2019-06-28

## 2019-06-28 ENCOUNTER — APPOINTMENT (OUTPATIENT)
Dept: GASTROENTEROLOGY | Facility: CLINIC | Age: 64
End: 2019-06-28
Payer: MEDICARE

## 2019-06-28 DIAGNOSIS — Z93.1 GASTROSTOMY STATUS: Chronic | ICD-10-CM

## 2019-06-28 DIAGNOSIS — Z93.59 OTHER CYSTOSTOMY STATUS: Chronic | ICD-10-CM

## 2019-06-28 DIAGNOSIS — Z98.890 OTHER SPECIFIED POSTPROCEDURAL STATES: Chronic | ICD-10-CM

## 2019-06-28 PROCEDURE — 99214 OFFICE O/P EST MOD 30 MIN: CPT

## 2019-06-28 NOTE — ASSESSMENT
[FreeTextEntry1] : 64 yo M with PMH of cerebral palsy from group home, seizure disorder, spastic paraplegia, s/p PEG tube, BPH, bladder neck stricture s/p suprapubic cath, asthma, nephrolithiasis, chronic constipation came for post-hospitalization visit for foreign body in colon.\par \par #Foreign body in right lower quadrant:\par -has been there since April 2019\par -patient is asymptomatic, unclear its nature. \par -repeat CT scan on 6/21/19 ( image is not available in our system) which still showed metal foreign body in Right colon. \par - Colonoscopy 8/5/17; did not show any foreign bodies\par - Will give GoLYTELY over 2 days (to be taken at any point in the next few weeks) to help clear foreign body\par - Repeat CT in Mid July \par - Will consider colonoscopy if foreign body is still in colon

## 2019-06-28 NOTE — PHYSICAL EXAM
[General Appearance - In No Acute Distress] : in no acute distress [Heart Rate And Rhythm] : heart rate was normal and rhythm regular [Heart Sounds] : normal S1 and S2 [Murmurs] : no murmurs [Heart Sounds Gallop] : no gallops [Heart Sounds Pericardial Friction Rub] : no pericardial rub [Bowel Sounds] : normal bowel sounds [Abdomen Soft] : soft [Abdomen Tenderness] : non-tender [Sclera] : the sclera and conjunctiva were normal [Neck Appearance] : the appearance of the neck was normal [Outer Ear] : the ears and nose were normal in appearance [Respiration, Rhythm And Depth] : normal respiratory rhythm and effort [] : no respiratory distress [FreeTextEntry1] : suprapubic catheter and PEG  tube present, no signs of leaking or erythema around both Tubes.  [Auscultation Breath Sounds / Voice Sounds] : lungs were clear to auscultation bilaterally

## 2019-06-28 NOTE — END OF VISIT
[] : Resident [>50% of Time Spent on Coordination of Care for  ___] : Greater than 50% of the encounter time was spent on coordination of care for [unfilled] [Time Spent: ___ minutes] : I have spent [unfilled] minutes of face to face time with the patient

## 2019-06-28 NOTE — HISTORY OF PRESENT ILLNESS
[FreeTextEntry1] : 64 yo M with PMH of cerebral palsy from group home, seizure disorder, spastic paraplegia, s/p PEG tube, BPH, bladder neck stricture s/p suprapubic cath, asthma, nephrolithiasis, chronic constipation came for post-hospitalization visit.\par He was hospitalized on June 3rd 2019 for malfunctioning PEG-tube.The adaptor was replaced and PEG was functioning good after that.\par \par There was also Foreign body in right lower quadrant:, has been there since April 2019, patient is asymptomatic, unclear its nature. He had repeat CT scan on 6/21/19 ( image is not available in our system) which still showed metal foreign body in Right colon. patient denies any abdomen pain or constipation.\par \par Patient answer simple questions.

## 2019-07-08 ENCOUNTER — APPOINTMENT (OUTPATIENT)
Dept: UROLOGY | Facility: CLINIC | Age: 64
End: 2019-07-08
Payer: MEDICARE

## 2019-07-08 PROCEDURE — 51705 CHANGE OF BLADDER TUBE: CPT | Mod: 58

## 2019-07-08 PROCEDURE — 99213 OFFICE O/P EST LOW 20 MIN: CPT | Mod: 25

## 2019-07-08 NOTE — PHYSICAL EXAM
[General Appearance - Well Developed] : well developed [Normal Appearance] : normal appearance [Well Groomed] : well groomed [General Appearance - Well Nourished] : well nourished [General Appearance - In No Acute Distress] : no acute distress [Abdomen Soft] : soft [Costovertebral Angle Tenderness] : no ~M costovertebral angle tenderness [Abdomen Tenderness] : non-tender [Edema] : no peripheral edema [Skin Color & Pigmentation] : normal skin color and pigmentation [Respiration, Rhythm And Depth] : normal respiratory rhythm and effort [] : no respiratory distress [Exaggerated Use Of Accessory Muscles For Inspiration] : no accessory muscle use [Affect] : the affect was normal [Mood] : the mood was normal [FreeTextEntry1] : cerebral palsy- able to answer questions

## 2019-07-08 NOTE — ASSESSMENT
[FreeTextEntry1] : 64 yo with CP and urethral stricture\par \par the patient undergoes SPT exchange monthly.  was evaluated for perineal urethrostomy last visit by Dr powell but it was thought that it might lead to sacral ulcers.\par \par He has been assessed for urethrostomy by Dr Bridger Kam was the decision per patient, was that he would rather continue with SPT than to have surgery \par \par As far as stone treatment, there are only few punctate stones left after treatment by Dr brown.  Being evaluated by Dr Soriano for stone prevention

## 2019-07-08 NOTE — HISTORY OF PRESENT ILLNESS
[FreeTextEntry1] : 62 yo with CP and urethral stricture\par \par the patient undergoes SPT exchange monthly.  was evaluated for perineal urethrostomy last visit by Dr powell but it was thought that it might lead to sacral ulcers.\par \par He has been assessed for urethrostomy by Dr Bridegr Kam was the decision per patient, was that he would rather continue with SPT than to have surgery \par \par As far as stone treatment, there are only few punctate stones left after treatment by Dr brown.  Being evaluated by Dr Soriano for stone prevention

## 2019-07-15 ENCOUNTER — FORM ENCOUNTER (OUTPATIENT)
Age: 64
End: 2019-07-15

## 2019-07-16 ENCOUNTER — OUTPATIENT (OUTPATIENT)
Dept: OUTPATIENT SERVICES | Facility: HOSPITAL | Age: 64
LOS: 1 days | Discharge: HOME | End: 2019-07-16
Payer: MEDICARE

## 2019-07-16 DIAGNOSIS — Z98.890 OTHER SPECIFIED POSTPROCEDURAL STATES: Chronic | ICD-10-CM

## 2019-07-16 DIAGNOSIS — Z93.1 GASTROSTOMY STATUS: Chronic | ICD-10-CM

## 2019-07-16 DIAGNOSIS — Z93.59 OTHER CYSTOSTOMY STATUS: Chronic | ICD-10-CM

## 2019-07-16 DIAGNOSIS — T18.4XXA FOREIGN BODY IN COLON, INITIAL ENCOUNTER: ICD-10-CM

## 2019-07-16 PROCEDURE — 74176 CT ABD & PELVIS W/O CONTRAST: CPT | Mod: 26

## 2019-07-20 ENCOUNTER — EMERGENCY (EMERGENCY)
Facility: HOSPITAL | Age: 64
LOS: 0 days | Discharge: HOME | End: 2019-07-20
Attending: EMERGENCY MEDICINE | Admitting: EMERGENCY MEDICINE
Payer: MEDICARE

## 2019-07-20 VITALS
DIASTOLIC BLOOD PRESSURE: 72 MMHG | OXYGEN SATURATION: 97 % | SYSTOLIC BLOOD PRESSURE: 106 MMHG | RESPIRATION RATE: 18 BRPM | HEART RATE: 81 BPM

## 2019-07-20 VITALS — HEART RATE: 67 BPM | SYSTOLIC BLOOD PRESSURE: 110 MMHG | DIASTOLIC BLOOD PRESSURE: 55 MMHG

## 2019-07-20 DIAGNOSIS — T83.098A OTHER MECHANICAL COMPLICATION OF OTHER URINARY CATHETER, INITIAL ENCOUNTER: ICD-10-CM
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
## 2019-07-20 DIAGNOSIS — Z93.59 OTHER CYSTOSTOMY STATUS: Chronic | ICD-10-CM

## 2019-07-20 DIAGNOSIS — Z93.1 GASTROSTOMY STATUS: Chronic | ICD-10-CM

## 2019-07-20 DIAGNOSIS — Z79.51 LONG TERM (CURRENT) USE OF INHALED STEROIDS: ICD-10-CM

## 2019-07-20 DIAGNOSIS — J45.909 UNSPECIFIED ASTHMA, UNCOMPLICATED: ICD-10-CM

## 2019-07-20 DIAGNOSIS — Z79.899 OTHER LONG TERM (CURRENT) DRUG THERAPY: ICD-10-CM

## 2019-07-20 DIAGNOSIS — Z98.890 OTHER SPECIFIED POSTPROCEDURAL STATES: Chronic | ICD-10-CM

## 2019-07-20 DIAGNOSIS — Z79.2 LONG TERM (CURRENT) USE OF ANTIBIOTICS: ICD-10-CM

## 2019-07-20 PROCEDURE — 99282 EMERGENCY DEPT VISIT SF MDM: CPT | Mod: GC

## 2019-07-20 NOTE — ED PROVIDER NOTE - PHYSICAL EXAMINATION
CONSTITUTIONAL: Well-developed; well-nourished; in no acute distress.   SKIN: warm, dry; no erythema, drainage, skin breakdown around suprapubic catheter.   HEAD: Normocephalic; atraumatic.  EYES: PERRL, EOMI, normal sclera and conjunctiva   ENT: No nasal discharge; airway clear.  NECK: Supple; non tender.  CARD: S1, S2 normal; no murmurs, gallops, or rubs. Regular rate and rhythm.   RESP: No wheezes, rales or rhonchi.  ABD: soft ntnd; peg tube in place  EXT: Normal ROM.  No clubbing, cyanosis or edema.   LYMPH: No acute cervical adenopathy.  NEURO: Alert, oriented, grossly unremarkable  PSYCH: Cooperative, appropriate.

## 2019-07-20 NOTE — CONSULT NOTE ADULT - SUBJECTIVE AND OBJECTIVE BOX
Patient is a 63y old  Male who presents with a chief complaint of     HPI:  63 y.o M with PMH of cerebral palsy, seizures, spastic quadriplegia, Urinary calculi, Asthma, PEG, bladder neck stricture s/p suprapubic cath, asthma, nephrolithiasis, chronic constipation p/w leaking and minimally draining SPT. Pt. is F/U with Dr. Joyner and Catheter was changed earlier this months in the urology clinic.  Pt. denies fever. chills, N/V, SOB, CP  Pt. seen and examined at bedside in NAD. SPT exchanged to 22 Fr. Edouard catheter with 200 cc of urine output, noted white sediment in the tube. Pt. tolerated procedure well left in NAD.     PAST MEDICAL & SURGICAL HISTORY:  Asthma  Urinary retention  Urinary calculi  Spastic quadriplegia  Osteoporosis  Seizure: last seizure &gt;10 years ago  Cerebral palsy  BPH (benign prostatic hyperplasia)  Suprapubic catheter  H/O cystoscopy  S/P percutaneous endoscopic gastrostomy (PEG) tube placement      REVIEW OF SYSTEMS:    CONSTITUTIONAL:  fevers or chills  HEENT: No visual changes  ENDO: No sweating  NECK: No pain or stiffness  MUSCULOSKELETAL: No back pain, no joint pain  RESPIRATORY: No shortness of breath  CARDIOVASCULAR: No chest pain  GASTROINTESTINAL: No abdominal or epigastric pain. No nausea, vomiting,  No diarrhea or constipation.   NEUROLOGICAL: No mental status changes  PSYCH: No depression, no mood changes  SKIN: No itching   as per HPI    MEDICATIONS  (STANDING):    MEDICATIONS  (PRN):      Allergies    No Known Allergies      SOCIAL HISTORY: Denies x 3    FAMILY HISTORY:  Family history unknown      Vital Signs Last 24 Hrs     HR: 81 (20 Jul 2019 09:34) (81 - 81)  BP: 106/72 (20 Jul 2019 09:34) (106/72 - 106/72)  RR: 18 (20 Jul 2019 09:34) (18 - 18)  SpO2: 97% (20 Jul 2019 09:34) (97% - 97%)     PHYSICAL EXAM:    Constitutional: on wheelchair, awake and alert   HEENT: NC/AT,  EOMI  Neck: no pain  Back: No CVA tenderness  Respiratory: No accessory respiratory muscle use  Abd: Soft, NT/ND + SPT in place with minimal UO, SPT exchanged to 22 Fr. Edouard catheter with 200 cc of urine output, noted white sediment in the tube. Pt. tolerated procedure well left in NAD.   Neurological: A/O x 3  Psychiatric: Normal mood, normal affect

## 2019-07-20 NOTE — ED ADULT NURSE NOTE - NSIMPLEMENTINTERV_GEN_ALL_ED
Implemented All Fall with Harm Risk Interventions:  [place] to call system. Call bell, personal items and telephone within reach. Instruct patient to call for assistance. Room bathroom lighting operational. Non-slip footwear when patient is off stretcher. Physically safe environment: no spills, clutter or unnecessary equipment. Stretcher in lowest position, wheels locked, appropriate side rails in place. Provide visual cue, wrist band, yellow gown, etc. Monitor gait and stability. Monitor for mental status changes and reorient to person, place, and time. Review medications for side effects contributing to fall risk. Reinforce activity limits and safety measures with patient and family. Provide visual clues: red socks.

## 2019-07-20 NOTE — ED PROVIDER NOTE - NS ED ROS FT
Eyes:  No visual changes, eye pain or discharge.  ENMT:  No hearing changes, pain, discharge or infections. No neck pain or stiffness.  Cardiac:  No chest pain, SOB or edema.   Respiratory:  No cough or respiratory distress.   GI:  No nausea, vomiting, diarrhea or abdominal pain.  :  Leakage around suprapubic cathether, decreased drainage from suprapubic catheter. No dysuria, frequency or burning.  MS:  No myalgia, muscle weakness, joint pain or back pain.  Neuro:  No headache or weakness.  No LOC.  Skin:  No skin rash.   Endocrine: No history of thyroid disease or diabetes.

## 2019-07-20 NOTE — ED PROVIDER NOTE - ATTENDING CONTRIBUTION TO CARE
63M PMH CP, sz, paraplegia, PEG tube, BPH, chronic constipation, asthma, kidney stone, bladder neck stricture s/p suprapubic cath (Dr Joyner), sent from group home for dec UOP since yesterday w urine leaking around catheter. per aide, RN at facility tried to flush it but didn't work. aide at bedside thinks the suprapubic cath was last changed 1-2 months ago. pt is asymptomatic. denies abd pain, nv. denies fever. no flank pain.     on exam, AFVSS, well dee nad, ncat, eomi, perrla, mmm, lctab, rrr nl s1s2 no mrg, abd soft ntnd, aaox3, no focal deficits, no le edema or calf ttp, suprapubic cath in place w mild urine leaking, nontender, scant clear yellow fluid in catheter draining sediments noted     a/p; Needs suprapubic cath changed. Urology consult.

## 2019-07-20 NOTE — ED PROVIDER NOTE - NSFOLLOWUPINSTRUCTIONS_ED_ALL_ED_FT
Suprapubic Catheter Replacement, Care After  Refer to this sheet in the next few weeks. These instructions provide you with information about caring for yourself after your procedure. Your health care provider may also give you more specific instructions. Your treatment has been planned according to current medical practices, but problems sometimes occur. Call your health care provider if you have any problems or questions after your procedure.    What can I expect after the procedure?  After your procedure, it is possible to have some discomfort around the opening in your abdomen.    Follow these instructions at home:  Caring for your skin around the catheter     Use a clean washcloth and soapy water to clean the skin around your catheter every day. Pat the area dry with a clean towel.  Do not pull on the catheter.  Do not use ointment or lotion on this area unless told by your health care provider.  Check your skin around the catheter every day for signs of infection. Check for:  Redness, swelling, or pain.  Fluid or blood.  Warmth.  Pus or a bad smell.  Caring for the catheter tube    Clean the catheter tube with soap and water as often as told by your health care provider.  Always make sure there are no twists or curls (kinks) in the catheter tube.  Emptying the collection bag    Empty the large collection bag every 8 hours. Empty the small collection bag when it is about ? full. To empty your large or small collection bag, take the following steps:  Always keep the bag below the level of the catheter. This keeps urine from flowing backwards into the catheter.  Hold the bag over the toilet or another container. Turn the valve (spigot) at the bottom of the bag to empty the urine.  Do not touch the opening of the spigot.  Do not let the opening touch the toilet or container.  Close the spigot tightly when the bag is empty.  Cleaning the collection bag    Clean the collection bag every 2–3 days, or as often as told by your health care provider. To do this, take the following steps:  Wash your hands with soap and water. If soap and water are not available, use hand .  Disconnect the bag from the catheter and immediately attach a new bag to the catheter.  Empty the used bag completely.  Clean the used bag using one of the following methods:  Rinse the bag with warm water and soap.  Fill the bag with water and add 1 tsp of vinegar. Let it sit for about 30 minutes, then empty the bag.  Let the bag dry completely, and put it in a clean plastic bag before storing it.  General instructions    Always wash your hands before and after caring for your catheter and collection bag. Use a mild, fragrance-free soap. If soap and water are not available, use hand .  Always make sure there are no leaks in the catheter or collection bag.  Drink enough fluid to keep your urine clear or pale yellow.  If you were prescribed an antibiotic medicine, take it as told by your health care provider. Do not stop taking the antibiotic even if you start to feel better.  Do not take baths, swim, or use a hot tub.  Keep all follow-up appointments as told by your health care provider. This is important.  Contact a health care provider if:  You leak urine.  You have redness, swelling, or pain around your catheter opening.  You have fluid or blood coming from your catheter opening.  Your catheter opening feels warm to the touch.  You have pus or a bad smell coming from your catheter opening.  You have a fever or chills.  Your urine flow slows down.  Your urine becomes cloudy or smelly.  Get help right away if:  Your catheter comes out.  You feel nauseous.  You have back pain.  You have difficulty changing your catheter.  You have blood in your urine.  You have no urine flow for 1 hour.  This information is not intended to replace advice given to you by your health care provider. Make sure you discuss any questions you have with your health care provider.

## 2019-07-20 NOTE — PROCEDURE NOTE - NSURITECHNIQUE_GU_A_CORE
The catheter was secured with a securement device (e.g. StatLock)/The urinary drainage system is closed at the end of the procedure/All applicable medical record documentation is completed/Proper hand hygiene was performed/The catheter was appropriately lubricated/Sterile gloves were worn for the duration of the procedure/A sterile drape was used to cover all adjacent areas/The site was cleaned with soap/water and sterile solution (betadine)/The collection bag is below the level of the patient and urinary bladder

## 2019-07-20 NOTE — PROCEDURE NOTE - SUPERVISORY STATEMENT
I was not aware of this patient until after the procedure had been taking care of whatever I reviewed the note and agree with above.   Physicians assistant took care of the patient and did not require at

## 2019-07-20 NOTE — ED PROVIDER NOTE - CARE PROVIDER_API CALL
Solis Joyner)  [facility]  [address] [place] [address], [address]  [place] [address]  Phone: [phone]  Fax: [phone]  Follow Up Time:

## 2019-07-20 NOTE — CONSULT NOTE ADULT - ASSESSMENT
63 y.o M with PMH of cerebral palsy, seizures, spastic quadriplegia, Urinary calculi, Asthma, PEG, bladder neck stricture s/p suprapubic cath, asthma, nephrolithiasis, chronic constipation c/o leaking and minimally draining SPT.   SPT exchanged to 22 Fr. Edouard catheter with 200 cc of urine output, noted white sediment in the tube. Pt. tolerated procedure well left in NAD.     Plan:  Keep SPT in place, drain to gravity  Daily SPT dressing changes   Monitor UO  Improve hydration  F/U with Dr. Joyner as an outpatient for further management and SPT changes.

## 2019-07-20 NOTE — ED PROVIDER NOTE - PROGRESS NOTE DETAILS
Urology consult placed, will come see patient. CATIE Medley replaced his suprapubic catheter and now it is draining clear/yellow urine. states is ready for dc back to facility. f/u dr caceres as outpt 1-2 weeks.

## 2019-07-20 NOTE — CONSULT NOTE ADULT - ATTENDING COMMENTS
I was not aware of this patient until after the procedure had been taking care of whatever I reviewed the note and agree with above  Physicians assistant took care of the situation without need for attending input

## 2019-07-20 NOTE — ED ADULT NURSE NOTE - OBJECTIVE STATEMENT
pt sent by group home for decreased urine output from suprapubic catheter x 1 week. as per aide, diaper noted to be wet with urine this morning. denies fever/chills.

## 2019-07-20 NOTE — ED PROVIDER NOTE - CLINICAL SUMMARY MEDICAL DECISION MAKING FREE TEXT BOX
CATIE Medley replaced his suprapubic catheter and now it is draining clear/yellow urine. states is ready for dc back to facility. f/u dr caceres as outpt 1-2 weeks.

## 2019-07-20 NOTE — ED PROVIDER NOTE - OBJECTIVE STATEMENT
63 y m pmh bph, urethral strictures s/p suprapubic catheter placement, cerebral palsy, seizures, spastic quadriplegia pw leakage around suprapubic catheter. Per pt and aide found to have urine soaked diaper and very little drainage into the suprapubic bag for the past few days. Patient states urine is only leaking around the stoma site and not where it is supposed to. Denies abd pain, cp, sob, back pain, flank pain, fever.

## 2019-07-23 ENCOUNTER — EMERGENCY (EMERGENCY)
Facility: HOSPITAL | Age: 64
LOS: 0 days | Discharge: HOME | End: 2019-07-23
Attending: EMERGENCY MEDICINE | Admitting: EMERGENCY MEDICINE
Payer: MEDICARE

## 2019-07-23 VITALS
RESPIRATION RATE: 20 BRPM | OXYGEN SATURATION: 96 % | DIASTOLIC BLOOD PRESSURE: 74 MMHG | TEMPERATURE: 97 F | HEART RATE: 112 BPM | SYSTOLIC BLOOD PRESSURE: 124 MMHG

## 2019-07-23 VITALS — HEART RATE: 95 BPM

## 2019-07-23 DIAGNOSIS — Z93.59 OTHER CYSTOSTOMY STATUS: Chronic | ICD-10-CM

## 2019-07-23 DIAGNOSIS — R10.9 UNSPECIFIED ABDOMINAL PAIN: ICD-10-CM
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
## 2019-07-23 DIAGNOSIS — J45.909 UNSPECIFIED ASTHMA, UNCOMPLICATED: ICD-10-CM

## 2019-07-23 DIAGNOSIS — Z79.899 OTHER LONG TERM (CURRENT) DRUG THERAPY: ICD-10-CM
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
## 2019-07-23 DIAGNOSIS — Z79.2 LONG TERM (CURRENT) USE OF ANTIBIOTICS: ICD-10-CM

## 2019-07-23 DIAGNOSIS — T83.098A OTHER MECHANICAL COMPLICATION OF OTHER URINARY CATHETER, INITIAL ENCOUNTER: ICD-10-CM

## 2019-07-23 DIAGNOSIS — Z79.51 LONG TERM (CURRENT) USE OF INHALED STEROIDS: ICD-10-CM

## 2019-07-23 DIAGNOSIS — N39.0 URINARY TRACT INFECTION, SITE NOT SPECIFIED: ICD-10-CM

## 2019-07-23 DIAGNOSIS — Z98.890 OTHER SPECIFIED POSTPROCEDURAL STATES: Chronic | ICD-10-CM

## 2019-07-23 DIAGNOSIS — Z93.1 GASTROSTOMY STATUS: Chronic | ICD-10-CM

## 2019-07-23 LAB
ALBUMIN SERPL ELPH-MCNC: 4.2 G/DL — SIGNIFICANT CHANGE UP (ref 3.5–5.2)
ALP SERPL-CCNC: 110 U/L — SIGNIFICANT CHANGE UP (ref 30–115)
ALT FLD-CCNC: 15 U/L — SIGNIFICANT CHANGE UP (ref 0–41)
ANION GAP SERPL CALC-SCNC: 13 MMOL/L — SIGNIFICANT CHANGE UP (ref 7–14)
APPEARANCE UR: ABNORMAL
AST SERPL-CCNC: 23 U/L — SIGNIFICANT CHANGE UP (ref 0–41)
BACTERIA # UR AUTO: ABNORMAL /HPF
BASOPHILS # BLD AUTO: 0.01 K/UL — SIGNIFICANT CHANGE UP (ref 0–0.2)
BASOPHILS NFR BLD AUTO: 0.1 % — SIGNIFICANT CHANGE UP (ref 0–1)
BILIRUB SERPL-MCNC: 0.3 MG/DL — SIGNIFICANT CHANGE UP (ref 0.2–1.2)
BILIRUB UR-MCNC: NEGATIVE — SIGNIFICANT CHANGE UP
BUN SERPL-MCNC: 17 MG/DL — SIGNIFICANT CHANGE UP (ref 10–20)
CALCIUM SERPL-MCNC: 9.3 MG/DL — SIGNIFICANT CHANGE UP (ref 8.5–10.1)
CHLORIDE SERPL-SCNC: 104 MMOL/L — SIGNIFICANT CHANGE UP (ref 98–110)
CO2 SERPL-SCNC: 25 MMOL/L — SIGNIFICANT CHANGE UP (ref 17–32)
COLOR SPEC: YELLOW — SIGNIFICANT CHANGE UP
CREAT SERPL-MCNC: 0.5 MG/DL — LOW (ref 0.7–1.5)
DIFF PNL FLD: ABNORMAL
EOSINOPHIL # BLD AUTO: 0.02 K/UL — SIGNIFICANT CHANGE UP (ref 0–0.7)
EOSINOPHIL NFR BLD AUTO: 0.2 % — SIGNIFICANT CHANGE UP (ref 0–8)
GLUCOSE SERPL-MCNC: 125 MG/DL — HIGH (ref 70–99)
GLUCOSE UR QL: NEGATIVE — SIGNIFICANT CHANGE UP
HCT VFR BLD CALC: 42.3 % — SIGNIFICANT CHANGE UP (ref 42–52)
HGB BLD-MCNC: 14.7 G/DL — SIGNIFICANT CHANGE UP (ref 14–18)
IMM GRANULOCYTES NFR BLD AUTO: 0.3 % — SIGNIFICANT CHANGE UP (ref 0.1–0.3)
KETONES UR-MCNC: NEGATIVE — SIGNIFICANT CHANGE UP
LACTATE SERPL-SCNC: 1.6 MMOL/L — SIGNIFICANT CHANGE UP (ref 0.5–2.2)
LEUKOCYTE ESTERASE UR-ACNC: ABNORMAL
LIDOCAIN IGE QN: 25 U/L — SIGNIFICANT CHANGE UP (ref 7–60)
LYMPHOCYTES # BLD AUTO: 0.45 K/UL — LOW (ref 1.2–3.4)
LYMPHOCYTES # BLD AUTO: 4.9 % — LOW (ref 20.5–51.1)
MCHC RBC-ENTMCNC: 31.6 PG — HIGH (ref 27–31)
MCHC RBC-ENTMCNC: 34.8 G/DL — SIGNIFICANT CHANGE UP (ref 32–37)
MCV RBC AUTO: 91 FL — SIGNIFICANT CHANGE UP (ref 80–94)
MONOCYTES # BLD AUTO: 0.42 K/UL — SIGNIFICANT CHANGE UP (ref 0.1–0.6)
MONOCYTES NFR BLD AUTO: 4.5 % — SIGNIFICANT CHANGE UP (ref 1.7–9.3)
NEUTROPHILS # BLD AUTO: 8.34 K/UL — HIGH (ref 1.4–6.5)
NEUTROPHILS NFR BLD AUTO: 90 % — HIGH (ref 42.2–75.2)
NITRITE UR-MCNC: NEGATIVE — SIGNIFICANT CHANGE UP
NRBC # BLD: 0 /100 WBCS — SIGNIFICANT CHANGE UP (ref 0–0)
PH UR: 8.5 — HIGH (ref 5–8)
PLATELET # BLD AUTO: 172 K/UL — SIGNIFICANT CHANGE UP (ref 130–400)
POTASSIUM SERPL-MCNC: 4 MMOL/L — SIGNIFICANT CHANGE UP (ref 3.5–5)
POTASSIUM SERPL-SCNC: 4 MMOL/L — SIGNIFICANT CHANGE UP (ref 3.5–5)
PROT SERPL-MCNC: 7.6 G/DL — SIGNIFICANT CHANGE UP (ref 6–8)
PROT UR-MCNC: 100
RBC # BLD: 4.65 M/UL — LOW (ref 4.7–6.1)
RBC # FLD: 13.1 % — SIGNIFICANT CHANGE UP (ref 11.5–14.5)
RBC CASTS # UR COMP ASSIST: ABNORMAL /HPF
SODIUM SERPL-SCNC: 142 MMOL/L — SIGNIFICANT CHANGE UP (ref 135–146)
SP GR SPEC: 1.01 — SIGNIFICANT CHANGE UP (ref 1.01–1.03)
TRI-PHOS CRY UR QL COMP ASSIST: ABNORMAL /HPF
UROBILINOGEN FLD QL: 0.2 — SIGNIFICANT CHANGE UP (ref 0.2–0.2)
WBC # BLD: 9.27 K/UL — SIGNIFICANT CHANGE UP (ref 4.8–10.8)
WBC # FLD AUTO: 9.27 K/UL — SIGNIFICANT CHANGE UP (ref 4.8–10.8)
WBC UR QL: SIGNIFICANT CHANGE UP /HPF

## 2019-07-23 PROCEDURE — 99284 EMERGENCY DEPT VISIT MOD MDM: CPT | Mod: GC

## 2019-07-23 PROCEDURE — 71045 X-RAY EXAM CHEST 1 VIEW: CPT | Mod: 26

## 2019-07-23 PROCEDURE — 74177 CT ABD & PELVIS W/CONTRAST: CPT | Mod: 26

## 2019-07-23 RX ORDER — SODIUM CHLORIDE 9 MG/ML
1000 INJECTION INTRAMUSCULAR; INTRAVENOUS; SUBCUTANEOUS ONCE
Refills: 0 | Status: COMPLETED | OUTPATIENT
Start: 2019-07-23 | End: 2019-07-23

## 2019-07-23 RX ORDER — MOXIFLOXACIN HYDROCHLORIDE TABLETS, 400 MG 400 MG/1
1 TABLET, FILM COATED ORAL
Qty: 14 | Refills: 0
Start: 2019-07-23 | End: 2019-07-29

## 2019-07-23 RX ADMIN — SODIUM CHLORIDE 2000 MILLILITER(S): 9 INJECTION INTRAMUSCULAR; INTRAVENOUS; SUBCUTANEOUS at 13:14

## 2019-07-23 NOTE — ED PROVIDER NOTE - CLINICAL SUMMARY MEDICAL DECISION MAKING FREE TEXT BOX
62 Y/O M PMHX AS DOCUMENTED C/O R SIDED ABD PAIN. CT A/P WITH R HYDRONEPHROSIS LIKELY SECONDARY TO OBSTRUCTION FOR TIP OF SPT CATHETER. SPT CHANGED 7/20 BY UROLOGY IN THE ED. UROLOGY CONSULTED AND SPT CHANGED. U/A REVIEWED AND UROLOGY RECOMMENDED OUTPT ABX PRESCRIBED BASED ON PRIOR URINE CULTURES. PT DISCHARGED TO FACILITY.

## 2019-07-23 NOTE — ED PROVIDER NOTE - OBJECTIVE STATEMENT
64 yo M with hx of BPH, urethral strictures s/p suprapubic catheter placement, CP, seizures, spastic quadriplegia, presents for evaluation of right sided abdominal pain, onset last night, with no associated symptoms. No fever, no chills, no headache, no nausea, no vomiting, no chest pain, no back pain, no SOB. Pt states that the pain is "killing him". Suprapubic catheter is working well. Per staff, no fevers. No other symptoms reported

## 2019-07-23 NOTE — ED PROVIDER NOTE - NS ED ROS FT
Constitutional:  See HPI.  Eyes:  No visual changes, eye pain or discharge.  ENMT:  No hearing changes, pain, discharge or infections. No neck pain or stiffness.  Cardiac:  No chest pain, SOB or edema. No chest pain with exertion.  Respiratory:  No cough or respiratory distress. No hemoptysis. No history of asthma or RAD.  GI:  see hpi  :  see hpi  MS:  No myalgia, muscle weakness, joint pain or back pain.  Endocrine: No history of thyroid disease or diabetes.  Except as documented in the HPI,  all other systems are negative.

## 2019-07-23 NOTE — ED PROVIDER NOTE - GASTROINTESTINAL, MLM
Abdomen soft, right sided abdominal tenderness to palpation, no rebound, no rigidity, no guarding. No CVA tenderness, no skin break down

## 2019-07-23 NOTE — CONSULT NOTE ADULT - CONSULT REASON
abdominal pain  CT: with new  mild - no- moderate hydroureteronephrosis  with  diffuse urothelial enhancement  - could be potentially related  to obstruction  from suprapubic catheter tip , which courses toward  the right UVJ, previously at midline.

## 2019-07-23 NOTE — ED PROVIDER NOTE - PROGRESS NOTE DETAILS
Pt in no distress at this time Awaiting CT, ua pending 62 Y/O M CP, SPASTIC QUADRIPLEGIA, SEIZURE D/O, ASTHMA, BPH, S/P SPT, S/P PEG TUBE, C/O ABD PAIN SINCE THIS AM. PT REPORTS 2 EPISODES OF VOMITING THIS AM AND SEVERAL EPISODES OF LOOSE STOOL OVER THE LAST FEW DAYS. NO FEVER. PT LAST SEEN IN ED 3 DAYS AGO FOR SPT MALFUNCTION AND TUBE CHANGED. VITALS NOTED. ALERT OX3 NAD. NCAT PERRL. EOMI. OP NORMAL. MMM. NECK SUPPLE. LUNGS CLEAR B/L. RRR. S1S2. ABD- SOFT + DISTENDED + DIFFUSE TENDERNESS, INCREASED IN R ABDOMEN. NO REBOUND OR GUARDING. FEEDING TUBE SITE CLEAN AND DRY. SPT SITE CLEAN AND DRY. + URINE IN THE BAG. + SPASTIC QUADRIPLEGIA. Urology paged, aware of patient, will evaluate in the ED urology exchange the catheter Culture of previous urine sent on June 16th 2019, grew pseudomonas aeruginosa and it is sensitive to cipro, which the patient will be discharged with. Discussed with patient and staff labs and imaging. Discussed suprapubic catheter replacement. Discussed need to follow up with Dr. Joyner. Discussed need to take antibiotics. Discussed signs and symptoms to return to the Ed for. Pt understands and agrees with discharge plan Spoke to Dalila RN at home, discussed results, discussed need to take abx. She understands plan

## 2019-07-23 NOTE — ED PROVIDER NOTE - CARE PLAN
Principal Discharge DX:	Suprapubic catheter dysfunction  Secondary Diagnosis:	UTI (urinary tract infection)

## 2019-07-23 NOTE — CONSULT NOTE ADULT - SUBJECTIVE AND OBJECTIVE BOX
HPI:  63 y.o M with hx of BPH, urethral strictures s/p suprapubic catheter placement, CP, seizures, spastic quadriplegia, presents for evaluation of right sided abdominal pain, onset last night, with no associated symptoms. No fever, no chills, no headache, no nausea, no vomiting, no chest pain, no back pain, no SOB. Pt states that the pain is "killing him".  Pt. also reports to cough and feeling cold. Suprapubic catheter is working well. Per staff, no fevers. No other symptoms reported. SPT was changed recently 7/20/19 w/o complications.  CT A/P findings new  mild - no- moderate hydroureteronephrosis  with  diffuse urothelial enhancement  - could be potentially related  to obstruction  from suprapubic catheter tip , which courses toward  the right UVJ, previously at midline.       PAST MEDICAL & SURGICAL HISTORY:  Asthma  Urinary retention  Urinary calculi  Spastic quadriplegia  Osteoporosis  Seizure: last seizure &gt;10 years ago  Cerebral palsy  BPH (benign prostatic hyperplasia)  Suprapubic catheter  H/O cystoscopy  S/P percutaneous endoscopic gastrostomy (PEG) tube placement      REVIEW OF SYSTEMS:    CONSTITUTIONAL:  fevers or chills  HEENT: No visual changes  ENDO: No sweating  NECK: No pain or stiffness  MUSCULOSKELETAL: No back pain, no joint pain  RESPIRATORY: No shortness of breath  CARDIOVASCULAR: No chest pain  GASTROINTESTINAL: No abdominal or epigastric pain. No nausea, vomiting,  No diarrhea or constipation.   NEUROLOGICAL: No mental status changes  PSYCH: No depression, no mood changes  SKIN: No itching    Home Medications:  albuterol 2.5 mg/3 mL (0.083%) inhalation solution: 3 milliliter(s) inhaled every 6 hours, As Needed (23 Apr 2019 07:28)  Artificial Tears ophthalmic solution: 1 drop(s) to each affected eye 4 times a day (23 Apr 2019 07:28)  baclofen 10 mg oral tablet: 1 tab(s) by gastrostomy tube 3 times a day (23 Apr 2019 07:28)  docusate sodium 60 mg/15 mL oral syrup: 100 milligram(s) by gastrostomy tube once a day, As Needed for constipation (23 Apr 2019 07:28)  lactulose 10 g/15 mL oral solution: 30 milliliter(s) orally once a day (04 Jun 2019 15:22)  Oysco 500 (1250 mg calcium carbonate) oral tablet: 1 tab(s) by gastrostomy tube 2 times a day (23 Apr 2019 07:28)  PHENobarbital 64.8 mg oral tablet: 1 tab(s) orally once a day via G tube (04 Jun 2019 15:22)  polyethylene glycol 3350 oral powder for reconstitution: 17 gram(s) by gastrostomy tube 2 times a day (26 Apr 2019 09:13)  Ventolin HFA 90 mcg/inh inhalation aerosol: 2 puff(s) inhaled 4 times a day, As Needed (23 Apr 2019 07:28)      Allergies: NKDA      SOCIAL HISTORY: No illicit drug use, ETOH, smoking    FAMILY HISTORY:  Family history unknown      Vital Signs Last 24 Hrs  T(C): 36.2 (23 Jul 2019 11:08), Max: 36.2 (23 Jul 2019 11:08)  T(F): 97.2 (23 Jul 2019 11:08), Max: 97.2 (23 Jul 2019 11:08)  HR: 112 (23 Jul 2019 11:08) (112 - 112)  BP: 124/74 (23 Jul 2019 11:08) (124/74 - 124/74)  RR: 20 (23 Jul 2019 11:08) (20 - 20)  SpO2: 96% (23 Jul 2019 11:08) (96% - 96%)     PHYSICAL EXAM:  Constitutional: Alert and Oriented  HEENT: NC/AT, EOMI  Back: mild right CVAT  Respiratory: No accessory respiratory muscle use  Abd: Soft, mild ttp over suprapubic tube SPT in place drains cloudy urine   Extremities: contracted  Neurological: A/O x 3      LABS:                        14.7   9.27  )-----------( 172      ( 23 Jul 2019 12:03 )             42.3     07-23    142  |  104  |  17  ----------------------------<  125<H>  4.0   |  25  |  0.5<L>    Ca    9.3      23 Jul 2019 12:03    TPro  7.6  /  Alb  4.2  /  TBili  0.3  /  DBili  x   /  AST  23  /  ALT  15  /  AlkPhos  110  07-23    Urinalysis (07.23.19 @ 11:37)    Glucose Qualitative, Urine: Negative    Blood, Urine: Small    pH Urine: 8.5    Color: Yellow    Urine Appearance: Turbid    Bilirubin: Negative    Ketone - Urine: Negative    Specific Gravity: 1.015    Protein, Urine: 100    Urobilinogen: 0.2    Nitrite: Negative    Leukocyte Esterase Concentration: Moderate    Culture - Urine (06.16.19 @ 13:49)    -  Gentamicin: S <=4    -  Imipenem: S <=1    -  Levofloxacin: S <=2    -  Meropenem: S <=1    -  Piperacillin/Tazobactam: S <=16    -  Tobramycin: S <=4    -  Amikacin: S <=16    -  Aztreonam: I 16    -  Cefepime: S <=4    -  Ceftazidime: S 4    -  Ciprofloxacin: S <=1    Specimen Source: .Urine Suprapubic    Culture Results:   >100,000 CFU/ml Pseudomonas aeruginosa    Organism Identification: Pseudomonas aeruginosa    Organism: Pseudomonas aeruginosa    Method Type: MGIUEL A      RADIOLOGY & ADDITIONAL STUDIES:     < from: CT Abdomen and Pelvis w/ IV Cont (07.23.19 @ 14:07) >  EXAM:  CT ABDOMEN AND PELVIS IC            PROCEDURE DATE:  07/23/2019            INTERPRETATION:  CLINICAL HISTORY: 63-year-old male with cerebral palsy,   bladder neck stricture status post suprapubic catheter placement   suprapubic tenderness    TECHNIQUE: Contiguous axial CT images were obtained from the lower chest   to the pubic symphysis following administration of Optiray intravenous   contrast. Oral contrast was not administered. Reformatted images in the   coronal and sagittal planes were acquired.    COMPARISON: CT abdomen pelvis dated 7/16/2019      LOWER THORAX: Bibasilar atelectasis. Normal size heart. Elevated left   hemidiaphragm with superior protrusion of the stomach, similar to prior.    LIVER: Normal in size and configuration. No suspicious mass.    BILE DUCTS: No intrahepatic or extrahepatic bile duct dilation.    GALLBLADDER: No calcified stones. Normal wall thickness.    PANCREAS: Unremarkable. No main pancreatic duct dilation.    SPLEEN: Unremarkable.    ADRENAL GLANDS: Unremarkable.    KIDNEYS/URETERS: New mild to moderate right hydroureteronephrosis   extending to the level of the UVJ. Diffuse urothelial enhancement is   noted throughout the course of the ureter. No evidence of obstructing   right renal ureteral stone. Punctate nonobstructing right upper pole   renal calculus again noted.     BLADDER: Contracted thick walled urinary bladder demonstrate urothelial   enhancement with a suprapubic catheter noted. A suprapubic catheter is   noted with tip deviating rightward to the level of the right   ureterovesical junction. On CT of 7/16/2019 the catheter was oriented at   midline. Focus of gas in the urinary bladder, likely related to the   instrumentation.    REPRODUCTIVE ORGANS: Prostatic calcifications.    BOWEL: Gastrostomy tube in place. Metallic clip in the cecum. Appendix   not visualized. Moderate stool burden in the rectosigmoid colon. No   evidence of bowel obstruction. PERITONEUM/RETROPERITONEUM: No fluid   collection, ascites, or pneumoperitoneum.    LYMPH NODES: No abdominal or pelvic lymphadenopathy.    VESSELS: No abdominal aortic aneurysm.    ABDOMINAL/PELVIC WALL: Fat-containing bilateral inguinal hernias. Fluid   within the bilateral inguinal canals.    BONES: Osteopenia.        IMPRESSION:  New mild-to-moderate right hydroureteronephrosis compared to 7/16/2019   with diffuse urothelial enhancement, etiology of which is unclear - could    potentially be related to obstruction from the suprapubic catheter tip,   which courses towards the right UVJ, previously at midline. No evidence   of obstructing right ureteral stone.    Thick-walled urinary bladder with inflammatory changes, which can be   acute on chronic. Correlation with urinalysis recommended..        VALENTINA DORSEY M.D., ATTENDING RADIOLOGIST    < end of copied text >

## 2019-07-23 NOTE — ED PROVIDER NOTE - NSFOLLOWUPINSTRUCTIONS_ED_ALL_ED_FT
Urinary Tract Infection    A urinary tract infection (UTI) is an infection of any part of the urinary tract, which includes the kidneys, ureters, bladder, and urethra. Risk factors include ignoring your need to urinate, wiping back to front if female, being an uncircumcised male, and having diabetes or a weak immune system. Symptoms include frequent urination, pain or burning with urination, foul smelling urine, cloudy urine, pain in the lower abdomen, blood in the urine, and fever. If you were prescribed an antibiotic medicine, take it as told by your health care provider. Do not stop taking the antibiotic even if you start to feel better.    SEEK IMMEDIATE MEDICAL CARE IF YOU HAVE ANY OF THE FOLLOWING SYMPTOMS: severe back or abdominal pain, fever, inability to keep fluids or medicine down, dizziness/lightheadedness, or a change in mental status.      Suprapubic Catheter Replacement, Care After  Refer to this sheet in the next few weeks. These instructions provide you with information about caring for yourself after your procedure. Your health care provider may also give you more specific instructions. Your treatment has been planned according to current medical practices, but problems sometimes occur. Call your health care provider if you have any problems or questions after your procedure.    What can I expect after the procedure?  After your procedure, it is possible to have some discomfort around the opening in your abdomen.    Follow these instructions at home:  Caring for your skin around the catheter     Use a clean washcloth and soapy water to clean the skin around your catheter every day. Pat the area dry with a clean towel.  Do not pull on the catheter.  Do not use ointment or lotion on this area unless told by your health care provider.  Check your skin around the catheter every day for signs of infection. Check for:  Redness, swelling, or pain.  Fluid or blood.  Warmth.  Pus or a bad smell.  Caring for the catheter tube    Clean the catheter tube with soap and water as often as told by your health care provider.  Always make sure there are no twists or curls (kinks) in the catheter tube.  Emptying the collection bag    Empty the large collection bag every 8 hours. Empty the small collection bag when it is about ? full. To empty your large or small collection bag, take the following steps:  Always keep the bag below the level of the catheter. This keeps urine from flowing backwards into the catheter.  Hold the bag over the toilet or another container. Turn the valve (spigot) at the bottom of the bag to empty the urine.  Do not touch the opening of the spigot.  Do not let the opening touch the toilet or container.  Close the spigot tightly when the bag is empty.  Cleaning the collection bag    Clean the collection bag every 2–3 days, or as often as told by your health care provider. To do this, take the following steps:  Wash your hands with soap and water. If soap and water are not available, use hand .  Disconnect the bag from the catheter and immediately attach a new bag to the catheter.  Empty the used bag completely.  Clean the used bag using one of the following methods:  Rinse the bag with warm water and soap.  Fill the bag with water and add 1 tsp of vinegar. Let it sit for about 30 minutes, then empty the bag.  Let the bag dry completely, and put it in a clean plastic bag before storing it.  General instructions    ImageAlways wash your hands before and after caring for your catheter and collection bag. Use a mild, fragrance-free soap. If soap and water are not available, use hand .  Always make sure there are no leaks in the catheter or collection bag.  Drink enough fluid to keep your urine clear or pale yellow.  If you were prescribed an antibiotic medicine, take it as told by your health care provider. Do not stop taking the antibiotic even if you start to feel better.  Do not take baths, swim, or use a hot tub.  Keep all follow-up appointments as told by your health care provider. This is important.  Contact a health care provider if:  You leak urine.  You have redness, swelling, or pain around your catheter opening.  You have fluid or blood coming from your catheter opening.  Your catheter opening feels warm to the touch.  You have pus or a bad smell coming from your catheter opening.  You have a fever or chills.  Your urine flow slows down.  Your urine becomes cloudy or smelly.  Get help right away if:  Your catheter comes out.  You feel nauseous.  You have back pain.  You have difficulty changing your catheter.  You have blood in your urine.  You have no urine flow for 1 hour.  This information is not intended to replace advice given to you by your health care provider. Make sure you discuss any questions you have with your health care provider.

## 2019-07-23 NOTE — CONSULT NOTE ADULT - ASSESSMENT
63 y.o M with hx of BPH, urethral strictures chronic SPT s/p suprapubic catheter change 7/20/19 c/o abdominal pain, cough ang felling cold since last night. CT A/P findings new  mild - no- moderate hydroureteronephrosis  with  diffuse urothelial enhancement  - could be potentially related  to obstruction  from suprapubic catheter tip , which courses toward  the right UVJ, previously at midline.  Pseudomonas UTI    Plan:  Cough work-up  Spoke with Dr. Joyner   SPT changed to 22 Coude catheter   Monitor UO  F/U with Bladder US to evaluate Edouard catheter position+ jets  Start Abx   F/U as an outpatient with Dr. Joyner for further SPT changes.   Case d/w Dr. Joyner

## 2019-07-23 NOTE — PROCEDURE NOTE - NSURITECHNIQUE_GU_A_CORE
The urinary drainage system is closed at the end of the procedure/All applicable medical record documentation is completed/The site was cleaned with soap/water and sterile solution (betadine)/The catheter was secured with a securement device (e.g. StatLock)/The collection bag is below the level of the patient and urinary bladder/Proper hand hygiene was performed/Sterile gloves were worn for the duration of the procedure/A sterile drape was used to cover all adjacent areas/The catheter was appropriately lubricated

## 2019-07-23 NOTE — ED PROVIDER NOTE - CARE PROVIDER_API CALL
Solis Joyner)  [facility]  [address] [place] [address], [address]  [place] [address]  Phone: [phone]  Fax: [phone]  Follow Up Time: 4-6 Days

## 2019-07-25 LAB
-  AMIKACIN: SIGNIFICANT CHANGE UP
-  AMPICILLIN/SULBACTAM: SIGNIFICANT CHANGE UP
-  AMPICILLIN: SIGNIFICANT CHANGE UP
-  AZTREONAM: SIGNIFICANT CHANGE UP
-  CEFAZOLIN: SIGNIFICANT CHANGE UP
-  CEFEPIME: SIGNIFICANT CHANGE UP
-  CEFOXITIN: SIGNIFICANT CHANGE UP
-  CEFTRIAXONE: SIGNIFICANT CHANGE UP
-  CIPROFLOXACIN: SIGNIFICANT CHANGE UP
-  ERTAPENEM: SIGNIFICANT CHANGE UP
-  GENTAMICIN: SIGNIFICANT CHANGE UP
-  LEVOFLOXACIN: SIGNIFICANT CHANGE UP
-  MEROPENEM: SIGNIFICANT CHANGE UP
-  NITROFURANTOIN: SIGNIFICANT CHANGE UP
-  PIPERACILLIN/TAZOBACTAM: SIGNIFICANT CHANGE UP
-  TOBRAMYCIN: SIGNIFICANT CHANGE UP
-  TRIMETHOPRIM/SULFAMETHOXAZOLE: SIGNIFICANT CHANGE UP
CULTURE RESULTS: SIGNIFICANT CHANGE UP
METHOD TYPE: SIGNIFICANT CHANGE UP
ORGANISM # SPEC MICROSCOPIC CNT: SIGNIFICANT CHANGE UP
ORGANISM # SPEC MICROSCOPIC CNT: SIGNIFICANT CHANGE UP
SPECIMEN SOURCE: SIGNIFICANT CHANGE UP

## 2019-07-26 RX ORDER — CEFUROXIME AXETIL 250 MG
1 TABLET ORAL
Qty: 14 | Refills: 0
Start: 2019-07-26 | End: 2019-08-01

## 2019-07-26 NOTE — ED POST DISCHARGE NOTE - DETAILS
[facility]- MANY NUMBERS ARE NOT WORKING OR DO NOT HAVE A VOICEMAIL. ON PATIENT'S CELL PHONE I LEFT A MESSAGE THAT I AM SENDING CEFTIN TO HIS PHARMACY FOR URINARY TRACT INFECTION. AND TO START IT TODAY. LEFT MY NUMBER FOR RETURN CALL. [employer] LETTER SENT SISTER, EMILE CALLED BACK, MEDS SENT TO PHARMACY. UCX DISCUSSED. [employer] CANCELED. WILL F/U WITH PMD WITHIN 24 HOURS.

## 2019-08-05 ENCOUNTER — APPOINTMENT (OUTPATIENT)
Dept: UROLOGY | Facility: CLINIC | Age: 64
End: 2019-08-05
Payer: MEDICARE

## 2019-08-05 DIAGNOSIS — Z87.898 PERSONAL HISTORY OF OTHER SPECIFIED CONDITIONS: ICD-10-CM

## 2019-08-05 PROBLEM — R56.9 SEIZURES: Status: ACTIVE | Noted: 2017-04-13

## 2019-08-05 PROCEDURE — 51705 CHANGE OF BLADDER TUBE: CPT

## 2019-08-05 NOTE — PHYSICAL EXAM
[General Appearance - Well Developed] : well developed [General Appearance - Well Nourished] : well nourished [Normal Appearance] : normal appearance [Well Groomed] : well groomed [Abdomen Soft] : soft [General Appearance - In No Acute Distress] : no acute distress [Costovertebral Angle Tenderness] : no ~M costovertebral angle tenderness [Abdomen Tenderness] : non-tender [Edema] : no peripheral edema [Respiration, Rhythm And Depth] : normal respiratory rhythm and effort [] : no respiratory distress [Exaggerated Use Of Accessory Muscles For Inspiration] : no accessory muscle use [Affect] : the affect was normal [Oriented To Time, Place, And Person] : oriented to person, place, and time [Mood] : the mood was normal [Normal Station and Gait] : the gait and station were normal for the patient's age [Not Anxious] : not anxious [No Palpable Adenopathy] : no palpable adenopathy [No Focal Deficits] : no focal deficits

## 2019-08-05 NOTE — HISTORY OF PRESENT ILLNESS
[Currently Experiencing ___] :  [unfilled] [Urinary Retention] : urinary retention [FreeTextEntry1] : TISH SHAIKH is a 63 year old male with a past medical history of CP and urethral stricture . Presents to the office today for a follow up, last seen on 7/8/2019. Patient was assessed for urethrostomy by Dr Bridger Kam was the decision per patient, was that he would rather continue with SPT than to have surgery.  As per home, SPT cant be changed at the facility, so patient will continue to come to office for SPT change.\par

## 2019-08-06 ENCOUNTER — APPOINTMENT (OUTPATIENT)
Dept: NEPHROLOGY | Facility: CLINIC | Age: 64
End: 2019-08-06
Payer: MEDICARE

## 2019-08-06 ENCOUNTER — OUTPATIENT (OUTPATIENT)
Dept: OUTPATIENT SERVICES | Facility: HOSPITAL | Age: 64
LOS: 1 days | Discharge: HOME | End: 2019-08-06

## 2019-08-06 DIAGNOSIS — Z93.59 OTHER CYSTOSTOMY STATUS: Chronic | ICD-10-CM

## 2019-08-06 DIAGNOSIS — Z93.1 GASTROSTOMY STATUS: Chronic | ICD-10-CM

## 2019-08-06 DIAGNOSIS — Z98.890 OTHER SPECIFIED POSTPROCEDURAL STATES: Chronic | ICD-10-CM

## 2019-08-06 PROCEDURE — ZZZZZ: CPT

## 2019-08-06 NOTE — REVIEW OF SYSTEMS
[Fever] : no fever [Chills] : no chills [Shortness Of Breath] : no shortness of breath [Wheezing] : no wheezing [Abdominal Pain] : no abdominal pain [Vomiting] : no vomiting [FreeTextEntry8] : SPC

## 2019-08-06 NOTE — ASSESSMENT
[FreeTextEntry1] : 62 y/o M w/ PMhx of CP (wheelchair bound), s/p PEG, s/p suprapubic catheter d/t urethral strictures here for follow up for nephrolithiasis after 24 h urine collection.\par Currently he feels at baseline.\par  \par #nephrolithiasis\par -24 h urine studies noted, citrate 435mg/24h (borderline low)\par -will start K citrate 10meq twice a day\par -advise drinking a lot of water\par -f/u in 6 months

## 2019-08-06 NOTE — PHYSICAL EXAM
[General Appearance - Alert] : alert [General Appearance - In No Acute Distress] : in no acute distress [] : no respiratory distress [Auscultation Breath Sounds / Voice Sounds] : lungs were clear to auscultation bilaterally [Heart Rate And Rhythm] : heart rate was normal and rhythm regular [Heart Sounds] : normal S1 and S2 [Edema] : there was no peripheral edema [Bowel Sounds] : normal bowel sounds [Abdomen Soft] : soft [FreeTextEntry1] : PEG tube

## 2019-08-06 NOTE — HISTORY OF PRESENT ILLNESS
[FreeTextEntry1] : 64 y/o M w/ PMhx of CP (wheelchair bound), s/p PEG, s/p suprapubic catheter d/t urethral strictures here for follow up for nephrolithiasis after 24 h urine collection.\par Currently he feels at baseline.\par

## 2019-08-09 ENCOUNTER — APPOINTMENT (OUTPATIENT)
Dept: GASTROENTEROLOGY | Facility: CLINIC | Age: 64
End: 2019-08-09
Payer: MEDICARE

## 2019-08-09 ENCOUNTER — OUTPATIENT (OUTPATIENT)
Dept: OUTPATIENT SERVICES | Facility: HOSPITAL | Age: 64
LOS: 1 days | Discharge: HOME | End: 2019-08-09

## 2019-08-09 DIAGNOSIS — Z98.890 OTHER SPECIFIED POSTPROCEDURAL STATES: Chronic | ICD-10-CM

## 2019-08-09 DIAGNOSIS — Z93.1 GASTROSTOMY STATUS: Chronic | ICD-10-CM

## 2019-08-09 DIAGNOSIS — Z93.59 OTHER CYSTOSTOMY STATUS: Chronic | ICD-10-CM

## 2019-08-09 PROCEDURE — 99213 OFFICE O/P EST LOW 20 MIN: CPT | Mod: GE

## 2019-08-09 NOTE — ASSESSMENT
[FreeTextEntry1] : 62 yo M with PMH of cerebral palsy from group home, seizure disorder, spastic paraplegia, s/p PEG tube, BPH, bladder neck stricture s/p suprapubic cath, asthma, nephrolithiasis, chronic constipation came for follow up on  foreign body in colon.\par \par #Foreign body in right lower quadrant:\par -has been there since April 2019\par -patient is asymptomatic, unclear its nature, no hx of clip, filling tooth?\par -repeated CT stable position \par - Colonoscopy 8/5/17; did not show any foreign bodies\par -increase miralax to TID\par - follow up in 1 month for repeat abdominal series vs CT scan\par - Will consider colonoscopy if foreign body is still in colon.

## 2019-08-09 NOTE — END OF VISIT
[FreeTextEntry3] : Pt seen and examined.  Asymptomatic small foreign body in the cecum with moderate stool burden on CT.  Pt is already on miralax each day but stool burden persists.  I recommend increasing it to TID and seeing patient in one month at which time we can repeat abdominal series to see if present.

## 2019-08-09 NOTE — REVIEW OF SYSTEMS
[As Noted in HPI] : as noted in HPI [Constipation] : constipation [Fever] : no fever [Chills] : no chills [Recent Weight Gain (___ Lbs)] : no recent weight gain [Red Eyes] : eyes not red [Dry Eyes] : no dryness of the eyes [Loss Of Hearing] : no hearing loss [Sore Throat] : no sore throat [Chest Pain] : no chest pain [Palpitations] : no palpitations [Shortness Of Breath] : no shortness of breath [Cough] : no cough [FreeTextEntry9] : spastic paraplegia

## 2019-08-09 NOTE — HISTORY OF PRESENT ILLNESS
[Heartburn] : denies heartburn [Nausea] : denies nausea [Vomiting] : denies vomiting [Diarrhea] : denies diarrhea [Constipation] : improvement in constipation [Yellow Skin Or Eyes (Jaundice)] : denies jaundice [Abdominal Pain] : denies abdominal pain [de-identified] : 62 yo M with PMH of cerebral palsy from group home, seizure disorder, spastic paraplegia, s/p PEG tube, BPH, bladder neck stricture s/p suprapubic cath, asthma, nephrolithiasis, chronic constipation came for follow up on metallic foreign body in the cecum.\par \par previous CT scan june 2019 showed Foreign body in right lower quadrant:, has been there since April 2019\par patient is asymptomatic, unclear its nature possible tooth filling? he has constipation which improved and currently going once daily after his PMD added laxatives for him.\par last seen june 28th and the plan was to repeat CT scan after 2 days golytely. repeated CT scan7/16/2019 showed stable metallic density region of cecum. extensive fecal material with distal colon with rectal wall thickening (>7mm) possible stercoral colitis. \par \par Patient answer simple questions, denies pain, SOB, chest pain.\par

## 2019-08-13 ENCOUNTER — MEDICATION RENEWAL (OUTPATIENT)
Age: 64
End: 2019-08-13

## 2019-08-13 RX ORDER — POTASSIUM CITRATE 10 MEQ/1
10 MEQ TABLET, EXTENDED RELEASE ORAL TWICE DAILY
Qty: 60 | Refills: 5 | Status: DISCONTINUED | COMMUNITY
Start: 2019-08-06 | End: 2019-08-13

## 2019-08-23 ENCOUNTER — INPATIENT (INPATIENT)
Facility: HOSPITAL | Age: 64
LOS: 2 days | Discharge: NURSING, REHAB OR LONG-TERM CARE | End: 2019-08-26
Attending: HOSPITALIST | Admitting: HOSPITALIST
Payer: MEDICARE

## 2019-08-23 VITALS
OXYGEN SATURATION: 95 % | RESPIRATION RATE: 17 BRPM | HEART RATE: 105 BPM | TEMPERATURE: 100 F | SYSTOLIC BLOOD PRESSURE: 143 MMHG | DIASTOLIC BLOOD PRESSURE: 77 MMHG

## 2019-08-23 DIAGNOSIS — Z93.59 OTHER CYSTOSTOMY STATUS: Chronic | ICD-10-CM

## 2019-08-23 DIAGNOSIS — Z98.890 OTHER SPECIFIED POSTPROCEDURAL STATES: Chronic | ICD-10-CM

## 2019-08-23 DIAGNOSIS — Z93.1 GASTROSTOMY STATUS: Chronic | ICD-10-CM

## 2019-08-23 LAB
ALBUMIN SERPL ELPH-MCNC: 4.2 G/DL — SIGNIFICANT CHANGE UP (ref 3.5–5.2)
ALP SERPL-CCNC: 105 U/L — SIGNIFICANT CHANGE UP (ref 30–115)
ALT FLD-CCNC: 17 U/L — SIGNIFICANT CHANGE UP (ref 0–41)
ANION GAP SERPL CALC-SCNC: 11 MMOL/L — SIGNIFICANT CHANGE UP (ref 7–14)
APPEARANCE UR: ABNORMAL
APTT BLD: 37.2 SEC — SIGNIFICANT CHANGE UP (ref 27–39.2)
AST SERPL-CCNC: 23 U/L — SIGNIFICANT CHANGE UP (ref 0–41)
BACTERIA # UR AUTO: ABNORMAL
BASOPHILS # BLD AUTO: 0.02 K/UL — SIGNIFICANT CHANGE UP (ref 0–0.2)
BASOPHILS NFR BLD AUTO: 0.3 % — SIGNIFICANT CHANGE UP (ref 0–1)
BILIRUB SERPL-MCNC: 0.3 MG/DL — SIGNIFICANT CHANGE UP (ref 0.2–1.2)
BILIRUB UR-MCNC: NEGATIVE — SIGNIFICANT CHANGE UP
BUN SERPL-MCNC: 15 MG/DL — SIGNIFICANT CHANGE UP (ref 10–20)
CALCIUM SERPL-MCNC: 9.6 MG/DL — SIGNIFICANT CHANGE UP (ref 8.5–10.1)
CHLORIDE SERPL-SCNC: 105 MMOL/L — SIGNIFICANT CHANGE UP (ref 98–110)
CO2 SERPL-SCNC: 28 MMOL/L — SIGNIFICANT CHANGE UP (ref 17–32)
COLOR SPEC: YELLOW — SIGNIFICANT CHANGE UP
COMMENT - URINE: SIGNIFICANT CHANGE UP
CREAT SERPL-MCNC: 0.6 MG/DL — LOW (ref 0.7–1.5)
DIFF PNL FLD: ABNORMAL
EOSINOPHIL # BLD AUTO: 0.14 K/UL — SIGNIFICANT CHANGE UP (ref 0–0.7)
EOSINOPHIL NFR BLD AUTO: 2.2 % — SIGNIFICANT CHANGE UP (ref 0–8)
EPI CELLS # UR: 1 /HPF — SIGNIFICANT CHANGE UP (ref 0–5)
GLUCOSE SERPL-MCNC: 90 MG/DL — SIGNIFICANT CHANGE UP (ref 70–99)
GLUCOSE UR QL: NEGATIVE — SIGNIFICANT CHANGE UP
HCT VFR BLD CALC: 41.6 % — LOW (ref 42–52)
HGB BLD-MCNC: 14.2 G/DL — SIGNIFICANT CHANGE UP (ref 14–18)
HYALINE CASTS # UR AUTO: 5 /LPF — SIGNIFICANT CHANGE UP (ref 0–7)
IMM GRANULOCYTES NFR BLD AUTO: 0.3 % — SIGNIFICANT CHANGE UP (ref 0.1–0.3)
INR BLD: 1.06 RATIO — SIGNIFICANT CHANGE UP (ref 0.65–1.3)
KETONES UR-MCNC: NEGATIVE — SIGNIFICANT CHANGE UP
LACTATE SERPL-SCNC: 1.1 MMOL/L — SIGNIFICANT CHANGE UP (ref 0.5–2.2)
LEUKOCYTE ESTERASE UR-ACNC: ABNORMAL
LYMPHOCYTES # BLD AUTO: 0.97 K/UL — LOW (ref 1.2–3.4)
LYMPHOCYTES # BLD AUTO: 15 % — LOW (ref 20.5–51.1)
MCHC RBC-ENTMCNC: 31.6 PG — HIGH (ref 27–31)
MCHC RBC-ENTMCNC: 34.1 G/DL — SIGNIFICANT CHANGE UP (ref 32–37)
MCV RBC AUTO: 92.4 FL — SIGNIFICANT CHANGE UP (ref 80–94)
MONOCYTES # BLD AUTO: 0.54 K/UL — SIGNIFICANT CHANGE UP (ref 0.1–0.6)
MONOCYTES NFR BLD AUTO: 8.3 % — SIGNIFICANT CHANGE UP (ref 1.7–9.3)
NEUTROPHILS # BLD AUTO: 4.79 K/UL — SIGNIFICANT CHANGE UP (ref 1.4–6.5)
NEUTROPHILS NFR BLD AUTO: 73.9 % — SIGNIFICANT CHANGE UP (ref 42.2–75.2)
NITRITE UR-MCNC: POSITIVE
NRBC # BLD: 0 /100 WBCS — SIGNIFICANT CHANGE UP (ref 0–0)
PH UR: >8.9 — HIGH (ref 5–8)
PLATELET # BLD AUTO: 175 K/UL — SIGNIFICANT CHANGE UP (ref 130–400)
POTASSIUM SERPL-MCNC: 4 MMOL/L — SIGNIFICANT CHANGE UP (ref 3.5–5)
POTASSIUM SERPL-SCNC: 4 MMOL/L — SIGNIFICANT CHANGE UP (ref 3.5–5)
PROT SERPL-MCNC: 7.5 G/DL — SIGNIFICANT CHANGE UP (ref 6–8)
PROT UR-MCNC: ABNORMAL
PROTHROM AB SERPL-ACNC: 12.2 SEC — SIGNIFICANT CHANGE UP (ref 9.95–12.87)
RBC # BLD: 4.5 M/UL — LOW (ref 4.7–6.1)
RBC # FLD: 13.5 % — SIGNIFICANT CHANGE UP (ref 11.5–14.5)
RBC CASTS # UR COMP ASSIST: 145 /HPF — HIGH (ref 0–4)
SODIUM SERPL-SCNC: 144 MMOL/L — SIGNIFICANT CHANGE UP (ref 135–146)
SP GR SPEC: 1.01 — SIGNIFICANT CHANGE UP (ref 1.01–1.02)
TRI-PHOS CRY UR QL COMP ASSIST: ABNORMAL
UROBILINOGEN FLD QL: SIGNIFICANT CHANGE UP
WBC # BLD: 6.48 K/UL — SIGNIFICANT CHANGE UP (ref 4.8–10.8)
WBC # FLD AUTO: 6.48 K/UL — SIGNIFICANT CHANGE UP (ref 4.8–10.8)
WBC UR QL: 30 /HPF — HIGH (ref 0–5)

## 2019-08-23 PROCEDURE — 93010 ELECTROCARDIOGRAM REPORT: CPT

## 2019-08-23 PROCEDURE — 99285 EMERGENCY DEPT VISIT HI MDM: CPT | Mod: GC

## 2019-08-23 PROCEDURE — 71045 X-RAY EXAM CHEST 1 VIEW: CPT | Mod: 26

## 2019-08-23 PROCEDURE — 74177 CT ABD & PELVIS W/CONTRAST: CPT | Mod: 26

## 2019-08-23 RX ORDER — SODIUM CHLORIDE 9 MG/ML
1000 INJECTION, SOLUTION INTRAVENOUS ONCE
Refills: 0 | Status: COMPLETED | OUTPATIENT
Start: 2019-08-23 | End: 2019-08-23

## 2019-08-23 RX ORDER — SODIUM CHLORIDE 9 MG/ML
1000 INJECTION INTRAMUSCULAR; INTRAVENOUS; SUBCUTANEOUS
Refills: 0 | Status: DISCONTINUED | OUTPATIENT
Start: 2019-08-23 | End: 2019-08-25

## 2019-08-23 RX ORDER — ALBUTEROL 90 UG/1
2 AEROSOL, METERED ORAL EVERY 6 HOURS
Refills: 0 | Status: DISCONTINUED | OUTPATIENT
Start: 2019-08-23 | End: 2019-08-26

## 2019-08-23 RX ORDER — CEFTRIAXONE 500 MG/1
1000 INJECTION, POWDER, FOR SOLUTION INTRAMUSCULAR; INTRAVENOUS ONCE
Refills: 0 | Status: COMPLETED | OUTPATIENT
Start: 2019-08-23 | End: 2019-08-23

## 2019-08-23 RX ORDER — CALCIUM CARBONATE 500(1250)
1 TABLET ORAL DAILY
Refills: 0 | Status: DISCONTINUED | OUTPATIENT
Start: 2019-08-23 | End: 2019-08-26

## 2019-08-23 RX ORDER — PHENOBARBITAL 60 MG
64.8 TABLET ORAL DAILY
Refills: 0 | Status: DISCONTINUED | OUTPATIENT
Start: 2019-08-23 | End: 2019-08-23

## 2019-08-23 RX ORDER — CIPROFLOXACIN LACTATE 400MG/40ML
400 VIAL (ML) INTRAVENOUS EVERY 12 HOURS
Refills: 0 | Status: DISCONTINUED | OUTPATIENT
Start: 2019-08-23 | End: 2019-08-26

## 2019-08-23 RX ORDER — CHLORHEXIDINE GLUCONATE 213 G/1000ML
1 SOLUTION TOPICAL
Refills: 0 | Status: DISCONTINUED | OUTPATIENT
Start: 2019-08-23 | End: 2019-08-26

## 2019-08-23 RX ORDER — BACLOFEN 100 %
20 POWDER (GRAM) MISCELLANEOUS THREE TIMES A DAY
Refills: 0 | Status: DISCONTINUED | OUTPATIENT
Start: 2019-08-23 | End: 2019-08-26

## 2019-08-23 RX ORDER — ALBUTEROL 90 UG/1
2.5 AEROSOL, METERED ORAL ONCE
Refills: 0 | Status: DISCONTINUED | OUTPATIENT
Start: 2019-08-23 | End: 2019-08-26

## 2019-08-23 RX ORDER — ENOXAPARIN SODIUM 100 MG/ML
40 INJECTION SUBCUTANEOUS DAILY
Refills: 0 | Status: DISCONTINUED | OUTPATIENT
Start: 2019-08-23 | End: 2019-08-26

## 2019-08-23 RX ORDER — DOCUSATE SODIUM 100 MG
100 CAPSULE ORAL DAILY
Refills: 0 | Status: DISCONTINUED | OUTPATIENT
Start: 2019-08-23 | End: 2019-08-24

## 2019-08-23 RX ORDER — PHENOBARBITAL 60 MG
65 TABLET ORAL DAILY
Refills: 0 | Status: DISCONTINUED | OUTPATIENT
Start: 2019-08-23 | End: 2019-08-24

## 2019-08-23 RX ORDER — SENNA PLUS 8.6 MG/1
2 TABLET ORAL AT BEDTIME
Refills: 0 | Status: DISCONTINUED | OUTPATIENT
Start: 2019-08-23 | End: 2019-08-26

## 2019-08-23 RX ADMIN — CEFTRIAXONE 1000 MILLIGRAM(S): 500 INJECTION, POWDER, FOR SOLUTION INTRAMUSCULAR; INTRAVENOUS at 14:00

## 2019-08-23 RX ADMIN — Medication 200 MILLIGRAM(S): at 20:35

## 2019-08-23 RX ADMIN — SODIUM CHLORIDE 1000 MILLILITER(S): 9 INJECTION, SOLUTION INTRAVENOUS at 14:54

## 2019-08-23 RX ADMIN — SODIUM CHLORIDE 1000 MILLILITER(S): 9 INJECTION, SOLUTION INTRAVENOUS at 13:10

## 2019-08-23 RX ADMIN — SODIUM CHLORIDE 50 MILLILITER(S): 9 INJECTION INTRAMUSCULAR; INTRAVENOUS; SUBCUTANEOUS at 18:47

## 2019-08-23 RX ADMIN — CEFTRIAXONE 100 MILLIGRAM(S): 500 INJECTION, POWDER, FOR SOLUTION INTRAMUSCULAR; INTRAVENOUS at 13:10

## 2019-08-23 NOTE — H&P ADULT - HISTORY OF PRESENT ILLNESS
64 yo M with PMH of cerebral palsy from group home, seizure disorder, spastic paraplegia, s/p PEG tube, BPH, bladder neck stricture s/p suprapubic cath hx of ESBL -proteus and pseudomonas , asthma, nephrolithiasis, chronic constipation was sent in from group home for PEG tube malfunction evaluation.  Denies any associated fever or chills , no recent URI syx     in ED vitals were as 143/77 mmHg - /min RR nl and afebrile, saturating 95% on room air   labs were non - significant     CXR done showed   Stable left hemidiaphragm elevation with associated left lung base   opacity which may reflect compressive atelectasis.    Urine Microscopic-Add On (NC) (08.23.19 @ 10:57)    Triple Phosphate Crystals: Many    Red Blood Cell - Urine: 145 /HPF    White Blood Cell - Urine: 30 /HPF    Hyaline Casts: 5 /LPF    Bacteria: Many    Epithelial Cells: 1 /HPF    Comment - Urine: amorphous phosphate = many 64 yo M with PMH of cerebral palsy from group home, seizure disorder, spastic paraplegia, s/p PEG tube, BPH, bladder neck stricture s/p suprapubic cath hx of ESBL -proteus and pseudomonas , asthma, nephrolithiasis, chronic constipation was sent in from group home for PEG tube malfunction evaluation. Patient is complaining of urinary symptoms and pain at site of the PEG and urinary catheter- he denies any severe abd pain- no nausea - no vomiting.  Denies any associated fever or chills , no recent URI syx     in ED vitals were as 143/77 mmHg - /min RR nl and afebrile, saturating 95% on room air   labs were non - significant     CXR done showed   Stable left hemidiaphragm elevation with associated left lung base   opacity which may reflect compressive atelectasis.    Urine Microscopic-Add On (NC) (08.23.19 @ 10:57)    Triple Phosphate Crystals: Many    Red Blood Cell - Urine: 145 /HPF    White Blood Cell - Urine: 30 /HPF    Hyaline Casts: 5 /LPF    Bacteria: Many    Epithelial Cells: 1 /HPF    Comment - Urine: amorphous phosphate = many

## 2019-08-23 NOTE — CHART NOTE - NSCHARTNOTEFT_GEN_A_CORE
Attempted to advance NGT per radiology recommendation. Patient unable to tolerate. NGT advanced without difficulty, but patient worked tube back up. Attempted 3x. Spoke with pharmacy, changed Phenobarbital PO to IV.

## 2019-08-23 NOTE — ED PROVIDER NOTE - OBJECTIVE STATEMENT
63y M w/ PMH of BPH, urethral strictures s/p suprapubic catheter placement, CP, seizures, spastic quadriplegia, and PEG tube presents with malfunctioning feeding tube along with burning with urination that started earlier today. States that the feeding tube has stopped working for the past few days. Then this morning started having burning with urination. Denies fever, chills, CP, SOB, abd pain, or numbness/tingling.

## 2019-08-23 NOTE — ED ADULT NURSE NOTE - NSIMPLEMENTINTERV_GEN_ALL_ED
Implemented All Fall with Harm Risk Interventions:  [place] to call system. Call bell, personal items and telephone within reach. Instruct patient to call for assistance. Room bathroom lighting operational. Non-slip footwear when patient is off stretcher. Physically safe environment: no spills, clutter or unnecessary equipment. Stretcher in lowest position, wheels locked, appropriate side rails in place. Provide visual cue, wrist band, yellow gown, etc. Monitor gait and stability. Monitor for mental status changes and reorient to person, place, and time. Review medications for side effects contributing to fall risk. Reinforce activity limits and safety measures with patient and family. Provide visual clues: red socks.

## 2019-08-23 NOTE — ED ADULT TRIAGE NOTE - CHIEF COMPLAINT QUOTE
as per aide, "his g tube is broken and it needs to be fixed and he's in pain too around the Edouard site"

## 2019-08-23 NOTE — ED PROVIDER NOTE - CLINICAL SUMMARY MEDICAL DECISION MAKING FREE TEXT BOX
63 male here for PEG malfunction had screening labs imaging and reevaluation, plan is for admission.

## 2019-08-23 NOTE — CONSULT NOTE ADULT - ASSESSMENT
64 yo M with PMH of cerebral palsy from group home, seizure disorder was sent from the nursing home for PEG tube malfunctioning.       #PEG tube malfunctioning :           incomplete note 62 yo M with PMH of cerebral palsy from group home, seizure disorder was sent from the nursing home for PEG tube malfunctioning.     PEG tube was replaced by dr kennedy 9/2018 using 20 fresh balloon PEG tube.    #PEG tube malfunctioning :   will replace G tube on the BED side.         incomplete note 64 yo M with PMH of cerebral palsy from group home, seizure disorder was sent from the nursing home for PEG tube malfunctioning.     PEG tube was replaced by dr kennedy 9/2018 using 20 fresh balloon PEG tube.  ct abdomen:   Intact gastrostomy tube with distal tip within the gastric lumen.    #PEG tube malfunctioning :   will replace G tube on the BED side.         incomplete note 62 yo M with PMH of cerebral palsy from group home, seizure disorder was sent from the nursing home for PEG tube malfunctioning.     PEG tube was replaced by dr kennedy 9/2018 using 20 fresh balloon PEG tube.  ct abdomen:   Intact gastrostomy tube with distal tip within the gastric lumen.    #PEG tube malfunctioning :   we were able to push 60 cc of fluid through the PEG without any difficulties, PEG tube is functioning, please use PEG tube , if any resistance in future please do gastrografin tube study.

## 2019-08-23 NOTE — ED PROVIDER NOTE - PHYSICAL EXAMINATION
CONSTITUTIONAL: NAD  SKIN: warm, dry  HEAD: Normocephalic; atraumatic.  EYES: PERRL, EOMI, no conjunctival erythema  ENT: No nasal discharge; airway clear.  NECK: Supple; non tender.  CARD: S1, S2 normal; no murmurs, gallops, or rubs. Regular rate and rhythm.   RESP: No wheezes, rales or rhonchi.  ABD: soft ntnd. PEG tube in place, not draining. Suprapubic catheter in place, c/d/i.   EXT: Contracted..  No clubbing, cyanosis or edema.   LYMPH: No acute cervical adenopathy.  NEURO: Alert, oriented, grossly unremarkable  PSYCH: Cooperative, appropriate.

## 2019-08-23 NOTE — H&P ADULT - NSHPLABSRESULTS_GEN_ALL_CORE
Chest X-ray     Impression:      Stable left hemidiaphragm elevation with associated left lung base   opacity which may reflect compressive atelectasis.                          14.2   6.48  )-----------( 175      ( 23 Aug 2019 10:57 )             41.6   08-23    144  |  105  |  15  ----------------------------<  90  4.0   |  28  |  0.6<L>    Ca    9.6      23 Aug 2019 10:57    TPro  7.5  /  Alb  4.2  /  TBili  0.3  /  DBili  x   /  AST  23  /  ALT  17  /  AlkPhos  105  08-23    Urine Microscopic-Add On (NC) (08.23.19 @ 10:57)    Triple Phosphate Crystals: Many    Red Blood Cell - Urine: 145 /HPF    White Blood Cell - Urine: 30 /HPF    Hyaline Casts: 5 /LPF    Bacteria: Many    Epithelial Cells: 1 /HPF    Comment - Urine: amorphous phosphate = many

## 2019-08-23 NOTE — CONSULT NOTE ADULT - ATTENDING COMMENTS
Pt seen and examined with GI fellow.  Asked to see patient for clogged PEG.  At the bedside I was able to flush 60 cc of sterile water without difficulty into the stomach without leakage or backflow.  You may use PEG.

## 2019-08-23 NOTE — CONSULT NOTE ADULT - SUBJECTIVE AND OBJECTIVE BOX
Please contact me with any questions on my pager [phone].  Hospital Day #      HPI:   64 yo M with PMH of cerebral palsy from group home, seizure disorder, spastic paraplegia, s/p PEG tube, BPH, bladder neck stricture s/p suprapubic cath hx of ESBL -proteus and pseudomonas , asthma, nephrolithiasis, chronic constipation was sent in from group home for PEG tube malfunction evaluation. As per nursing home ,they were not able to flush the PEG tube. Patient last bowel movement was today.   Patient is complaining of urinary symptoms pain around urinary catheter- he denies any  abd pain- no nausea - no vomiting.  Denies any associated fever or chills , no recent URI syx      in ED vitals were as 143/77 mmHg - /min RR nl and afebrile, saturating 95% on room air   labs were non - significant         PAST MEDICAL & SURGICAL HISTORY  Asthma  Urinary retention  Urinary calculi  Spastic quadriplegia  Osteoporosis  Seizure: last seizure &gt;10 years ago  Cerebral palsy  BPH (benign prostatic hyperplasia)  Suprapubic catheter  H/O cystoscopy  S/P percutaneous endoscopic gastrostomy (PEG) tube placement      FAMILY HISTORY:  FAMILY HISTORY:  Family history unknown      SOCIAL HISTORY:  smoker: Denies  Alcohol: Denies  Drug: Denies    ALLERGIES:  No Known Allergies      MEDICATIONS:  MEDICATIONS  (STANDING):  ALBUTerol    0.083%. 2.5 milliGRAM(s) Nebulizer once  baclofen 20 milliGRAM(s) Oral three times a day  calcium carbonate   1250 mG (OsCal) 1 Tablet(s) Oral daily  chlorhexidine 4% Liquid 1 Application(s) Topical <User Schedule>  ciprofloxacin   IVPB 400 milliGRAM(s) IV Intermittent every 12 hours  docusate sodium 100 milliGRAM(s) Oral daily  enoxaparin Injectable 40 milliGRAM(s) SubCutaneous daily  PHENobarbital 64.8 milliGRAM(s) Oral daily  polyvinyl alcohol 1.4%/povidone 0.6% Ophthalmic Solution - Peds 1 Drop(s) Both EYES four times a day  senna 2 Tablet(s) Oral at bedtime  sodium chloride 0.9%. 1000 milliLiter(s) (50 mL/Hr) IV Continuous <Continuous>    MEDICATIONS  (PRN):  ALBUTerol    90 MICROgram(s) HFA Inhaler 2 Puff(s) Inhalation every 6 hours PRN Bronchospasm      HOME MEDICATIONS:  Home Medications:  albuterol 2.5 mg/3 mL (0.083%) inhalation solution: 3 milliliter(s) inhaled every 6 hours, As Needed (23 Apr 2019 07:28)  Artificial Tears ophthalmic solution: 1 drop(s) to each affected eye 4 times a day (23 Apr 2019 07:28)  baclofen 10 mg oral tablet: 1 tab(s) by gastrostomy tube 3 times a day (23 Apr 2019 07:28)  docusate sodium 60 mg/15 mL oral syrup: 100 milligram(s) by gastrostomy tube once a day, As Needed for constipation (23 Apr 2019 07:28)  lactulose 10 g/15 mL oral solution: 30 milliliter(s) orally once a day (04 Jun 2019 15:22)  Oysco 500 (1250 mg calcium carbonate) oral tablet: 1 tab(s) by gastrostomy tube 2 times a day (23 Apr 2019 07:28)  PHENobarbital 64.8 mg oral tablet: 1 tab(s) orally once a day via G tube (04 Jun 2019 15:22)  polyethylene glycol 3350 oral powder for reconstitution: 17 gram(s) by gastrostomy tube 2 times a day (26 Apr 2019 09:13)  Ventolin HFA 90 mcg/inh inhalation aerosol: 2 puff(s) inhaled 4 times a day, As Needed (23 Apr 2019 07:28)      ROS:     REVIEW OF SYSTEMS:    CONSTITUTIONAL: No fever  EYES/ENT: No scleral icterus  RESPIRATORY: No shortness of breath  CARDIOVASCULAR: No chest pain   GASTROINTESTINAL: as listed above  GENITOURINARY: No dysuria  NEUROLOGICAL: No dizziness  SKIN: No cyanosis      VITALS:   T(F): 97 (08-23 @ 21:00), Max: 99.5 (08-23 @ 10:07)  HR: 82 (08-23 @ 21:00) (82 - 105)  BP: 110/68 (08-23 @ 21:00) (102/78 - 143/77)  BP(mean): --  RR: 17 (08-23 @ 21:00) (17 - 18)  SpO2: 96% (08-23 @ 21:00) (95% - 98%)    I&O's Summary    23 Aug 2019 07:01  -  23 Aug 2019 23:33  --------------------------------------------------------  IN: 0 mL / OUT: 200 mL / NET: -200 mL        PHYSICAL EXAM:  Physical Exam:  GENERAL: NAD  HEAD:  Atraumatic  EYES:  conjunctiva and sclera clear  NECK: No thyromegaly  CHEST/LUNG: No accessory use of muscle  HEART: S1S2 Normal  ABDOMEN: Soft, Nontender, Nondistended; Bowel sounds present, no guarding or rigidity. PEG tube in place.  EXTREMITIES:  No cyanosis  NEURO: AAOx3        LABS:                        14.2   6.48  )-----------( 175      ( 23 Aug 2019 10:57 )             41.6       PT/ INR - ( 23 Aug 2019 10:57 )  INR: 1.06          PTT - ( 23 Aug 2019 10:57 )  PTT:37.2   LIVER FUNCTIONS - ( 23 Aug 2019 10:57 )  Alb: 4.2 g/dL / Pro: 7.5 g/dL / ALK PHOS: 105 U/L / ALT: 17 U/L / AST: 23 U/L / GGT: x           LIVER FUNCTIONS - ( 23 Aug 2019 10:57 )  Alb: 4.2 [3.5 - 5.2] / Pro: 7.5 [6.0 - 8.0] / ALK PHOS: 105 [30 - 115] / ALT: 17 [0 - 41] / AST: 23 [0 - 41] / GGT: x       08-23    144  |  105  |  15  ----------------------------<  90  4.0   |  28  |  0.6<L>    Ca    9.6      23 Aug 2019 10:57 Please contact me with any questions on my pager [phone].  Hospital Day #      HPI:   62 yo M with PMH of cerebral palsy from group home, seizure disorder, spastic paraplegia, s/p PEG tube, BPH, bladder neck stricture s/p suprapubic cath hx of ESBL -proteus and pseudomonas , asthma, nephrolithiasis, chronic constipation was sent in from group home for PEG tube malfunction evaluation. As per nursing home ,they were not able to flush the PEG tube. Patient last bowel movement was today.   Patient is complaining of urinary symptoms pain around urinary catheter- he denies any  abd pain- no nausea - no vomiting.  Denies any associated fever or chills , no recent URI syx      in ED vitals were as 143/77 mmHg - /min RR nl and afebrile, saturating 95% on room air   labs were non - significant         PAST MEDICAL & SURGICAL HISTORY  Asthma  Urinary retention  Urinary calculi  Spastic quadriplegia  Osteoporosis  Seizure: last seizure &gt;10 years ago  Cerebral palsy  BPH (benign prostatic hyperplasia)  Suprapubic catheter  H/O cystoscopy  S/P percutaneous endoscopic gastrostomy (PEG) tube placement      FAMILY HISTORY:  FAMILY HISTORY:  Family history unknown      SOCIAL HISTORY:  smoker: Denies  Alcohol: Denies  Drug: Denies    ALLERGIES:  No Known Allergies      MEDICATIONS:  MEDICATIONS  (STANDING):  ALBUTerol    0.083%. 2.5 milliGRAM(s) Nebulizer once  baclofen 20 milliGRAM(s) Oral three times a day  calcium carbonate   1250 mG (OsCal) 1 Tablet(s) Oral daily  chlorhexidine 4% Liquid 1 Application(s) Topical <User Schedule>  ciprofloxacin   IVPB 400 milliGRAM(s) IV Intermittent every 12 hours  docusate sodium 100 milliGRAM(s) Oral daily  enoxaparin Injectable 40 milliGRAM(s) SubCutaneous daily  PHENobarbital 64.8 milliGRAM(s) Oral daily  polyvinyl alcohol 1.4%/povidone 0.6% Ophthalmic Solution - Peds 1 Drop(s) Both EYES four times a day  senna 2 Tablet(s) Oral at bedtime  sodium chloride 0.9%. 1000 milliLiter(s) (50 mL/Hr) IV Continuous <Continuous>    MEDICATIONS  (PRN):  ALBUTerol    90 MICROgram(s) HFA Inhaler 2 Puff(s) Inhalation every 6 hours PRN Bronchospasm      HOME MEDICATIONS:  Home Medications:  albuterol 2.5 mg/3 mL (0.083%) inhalation solution: 3 milliliter(s) inhaled every 6 hours, As Needed (23 Apr 2019 07:28)  Artificial Tears ophthalmic solution: 1 drop(s) to each affected eye 4 times a day (23 Apr 2019 07:28)  baclofen 10 mg oral tablet: 1 tab(s) by gastrostomy tube 3 times a day (23 Apr 2019 07:28)  docusate sodium 60 mg/15 mL oral syrup: 100 milligram(s) by gastrostomy tube once a day, As Needed for constipation (23 Apr 2019 07:28)  lactulose 10 g/15 mL oral solution: 30 milliliter(s) orally once a day (04 Jun 2019 15:22)  Oysco 500 (1250 mg calcium carbonate) oral tablet: 1 tab(s) by gastrostomy tube 2 times a day (23 Apr 2019 07:28)  PHENobarbital 64.8 mg oral tablet: 1 tab(s) orally once a day via G tube (04 Jun 2019 15:22)  polyethylene glycol 3350 oral powder for reconstitution: 17 gram(s) by gastrostomy tube 2 times a day (26 Apr 2019 09:13)  Ventolin HFA 90 mcg/inh inhalation aerosol: 2 puff(s) inhaled 4 times a day, As Needed (23 Apr 2019 07:28)      ROS:     REVIEW OF SYSTEMS:    CONSTITUTIONAL: No fever  EYES/ENT: No scleral icterus  RESPIRATORY: No shortness of breath  CARDIOVASCULAR: No chest pain   GASTROINTESTINAL: as listed above  GENITOURINARY: No dysuria  NEUROLOGICAL: No dizziness  SKIN: No cyanosis      VITALS:   T(F): 97 (08-23 @ 21:00), Max: 99.5 (08-23 @ 10:07)  HR: 82 (08-23 @ 21:00) (82 - 105)  BP: 110/68 (08-23 @ 21:00) (102/78 - 143/77)  BP(mean): --  RR: 17 (08-23 @ 21:00) (17 - 18)  SpO2: 96% (08-23 @ 21:00) (95% - 98%)    I&O's Summary    23 Aug 2019 07:01  -  23 Aug 2019 23:33  --------------------------------------------------------  IN: 0 mL / OUT: 200 mL / NET: -200 mL        PHYSICAL EXAM:  Physical Exam:  GENERAL: NAD  HEAD:  Atraumatic  EYES:  conjunctiva and sclera clear  NECK: No thyromegaly  CHEST/LUNG: No accessory use of muscle  HEART: S1S2 Normal  ABDOMEN: Soft, Nontender, Nondistended; Bowel sounds present, no guarding or rigidity. PEG tube in place, water is coming back from G tube.  EXTREMITIES:  No cyanosis  NEURO: AAOx3        LABS:                        14.2   6.48  )-----------( 175      ( 23 Aug 2019 10:57 )             41.6       PT/ INR - ( 23 Aug 2019 10:57 )  INR: 1.06          PTT - ( 23 Aug 2019 10:57 )  PTT:37.2   LIVER FUNCTIONS - ( 23 Aug 2019 10:57 )  Alb: 4.2 g/dL / Pro: 7.5 g/dL / ALK PHOS: 105 U/L / ALT: 17 U/L / AST: 23 U/L / GGT: x           LIVER FUNCTIONS - ( 23 Aug 2019 10:57 )  Alb: 4.2 [3.5 - 5.2] / Pro: 7.5 [6.0 - 8.0] / ALK PHOS: 105 [30 - 115] / ALT: 17 [0 - 41] / AST: 23 [0 - 41] / GGT: x       08-23    144  |  105  |  15  ----------------------------<  90  4.0   |  28  |  0.6<L>    Ca    9.6      23 Aug 2019 10:57 Please contact me with any questions on my pager [phone].  Hospital Day #      HPI:   62 yo M with PMH of cerebral palsy from group home, seizure disorder, spastic paraplegia, s/p PEG tube, BPH, bladder neck stricture s/p suprapubic cath hx of ESBL -proteus and pseudomonas , asthma, nephrolithiasis, chronic constipation was sent in from group home for PEG tube malfunction evaluation. As per nursing home ,they were not able to flush the PEG tube. Patient last bowel movement was today.   Patient is complaining of urinary symptoms pain around urinary catheter- he denies any  abd pain- no nausea - no vomiting.  Denies any associated fever or chills , no recent URI syx      in ED vitals were as 143/77 mmHg - /min RR nl and afebrile, saturating 95% on room air   labs were non - significant         PAST MEDICAL & SURGICAL HISTORY  Asthma  Urinary retention  Urinary calculi  Spastic quadriplegia  Osteoporosis  Seizure: last seizure &gt;10 years ago  Cerebral palsy  BPH (benign prostatic hyperplasia)  Suprapubic catheter  H/O cystoscopy  S/P percutaneous endoscopic gastrostomy (PEG) tube placement      FAMILY HISTORY:  FAMILY HISTORY:  Family history unknown      SOCIAL HISTORY:  smoker: Denies  Alcohol: Denies  Drug: Denies    ALLERGIES:  No Known Allergies      MEDICATIONS:  MEDICATIONS  (STANDING):  ALBUTerol    0.083%. 2.5 milliGRAM(s) Nebulizer once  baclofen 20 milliGRAM(s) Oral three times a day  calcium carbonate   1250 mG (OsCal) 1 Tablet(s) Oral daily  chlorhexidine 4% Liquid 1 Application(s) Topical <User Schedule>  ciprofloxacin   IVPB 400 milliGRAM(s) IV Intermittent every 12 hours  docusate sodium 100 milliGRAM(s) Oral daily  enoxaparin Injectable 40 milliGRAM(s) SubCutaneous daily  PHENobarbital 64.8 milliGRAM(s) Oral daily  polyvinyl alcohol 1.4%/povidone 0.6% Ophthalmic Solution - Peds 1 Drop(s) Both EYES four times a day  senna 2 Tablet(s) Oral at bedtime  sodium chloride 0.9%. 1000 milliLiter(s) (50 mL/Hr) IV Continuous <Continuous>    MEDICATIONS  (PRN):  ALBUTerol    90 MICROgram(s) HFA Inhaler 2 Puff(s) Inhalation every 6 hours PRN Bronchospasm      HOME MEDICATIONS:  Home Medications:  albuterol 2.5 mg/3 mL (0.083%) inhalation solution: 3 milliliter(s) inhaled every 6 hours, As Needed (23 Apr 2019 07:28)  Artificial Tears ophthalmic solution: 1 drop(s) to each affected eye 4 times a day (23 Apr 2019 07:28)  baclofen 10 mg oral tablet: 1 tab(s) by gastrostomy tube 3 times a day (23 Apr 2019 07:28)  docusate sodium 60 mg/15 mL oral syrup: 100 milligram(s) by gastrostomy tube once a day, As Needed for constipation (23 Apr 2019 07:28)  lactulose 10 g/15 mL oral solution: 30 milliliter(s) orally once a day (04 Jun 2019 15:22)  Oysco 500 (1250 mg calcium carbonate) oral tablet: 1 tab(s) by gastrostomy tube 2 times a day (23 Apr 2019 07:28)  PHENobarbital 64.8 mg oral tablet: 1 tab(s) orally once a day via G tube (04 Jun 2019 15:22)  polyethylene glycol 3350 oral powder for reconstitution: 17 gram(s) by gastrostomy tube 2 times a day (26 Apr 2019 09:13)  Ventolin HFA 90 mcg/inh inhalation aerosol: 2 puff(s) inhaled 4 times a day, As Needed (23 Apr 2019 07:28)      ROS:     REVIEW OF SYSTEMS:    CONSTITUTIONAL: No fever  EYES/ENT: No scleral icterus  RESPIRATORY: No shortness of breath  CARDIOVASCULAR: No chest pain   GASTROINTESTINAL: as listed above  GENITOURINARY: No dysuria  NEUROLOGICAL: No dizziness  SKIN: No cyanosis      VITALS:   T(F): 97 (08-23 @ 21:00), Max: 99.5 (08-23 @ 10:07)  HR: 82 (08-23 @ 21:00) (82 - 105)  BP: 110/68 (08-23 @ 21:00) (102/78 - 143/77)  BP(mean): --  RR: 17 (08-23 @ 21:00) (17 - 18)  SpO2: 96% (08-23 @ 21:00) (95% - 98%)    I&O's Summary    23 Aug 2019 07:01  -  23 Aug 2019 23:33  --------------------------------------------------------  IN: 0 mL / OUT: 200 mL / NET: -200 mL        PHYSICAL EXAM:  Physical Exam:  GENERAL: NAD  HEAD:  Atraumatic  EYES:  conjunctiva and sclera clear  NECK: No thyromegaly  CHEST/LUNG: No accessory use of muscle  HEART: S1S2 Normal  ABDOMEN: Soft, Nontender, Nondistended; Bowel sounds present, no guarding or rigidity. PEG tube in place, water is coming back from G tube.  EXTREMITIES:  No cyanosis  NEURO: AAOx3        LABS:                        14.2   6.48  )-----------( 175      ( 23 Aug 2019 10:57 )             41.6       PT/ INR - ( 23 Aug 2019 10:57 )  INR: 1.06          PTT - ( 23 Aug 2019 10:57 )  PTT:37.2   LIVER FUNCTIONS - ( 23 Aug 2019 10:57 )  Alb: 4.2 g/dL / Pro: 7.5 g/dL / ALK PHOS: 105 U/L / ALT: 17 U/L / AST: 23 U/L / GGT: x           LIVER FUNCTIONS - ( 23 Aug 2019 10:57 )  Alb: 4.2 [3.5 - 5.2] / Pro: 7.5 [6.0 - 8.0] / ALK PHOS: 105 [30 - 115] / ALT: 17 [0 - 41] / AST: 23 [0 - 41] / GGT: x       08-23    144  |  105  |  15  ----------------------------<  90  4.0   |  28  |  0.6<L>    Ca    9.6      23 Aug 2019 10:57    < from: CT Abdomen and Pelvis w/ IV Cont (08.23.19 @ 23:02) >    Inflammatory changes surrounding the bladder and right ureter urothelial   cell enhancement. Findings can be seen with cystitis or pyelonephritis.   Recommend correlation with urinalysis.    Intact gastrostomy tube with distal tip within the gastric lumen.    Additional Findings/Recommendations After Attending Radiologist Review:    NG tube tip within the distal esophagus.      < end of copied text >

## 2019-08-23 NOTE — H&P ADULT - ASSESSMENT
64yo with above listed PMH presenting for peg tube malfunction, found to have mild left hydronephrosis, along with displaced SPC     1)Dislodged SPC with left mild hydronephrosis     Urology evaluation  might need replacement     Will cover empirically for UTI     F/U repeat urine Cx    Pain control     2)Cerbral palsy and hx of seizures     Keep Phenobarbital     3)Chronic constipation     But patient had a BM this morning     Will keep on Bowel regimen , Lactulose frequency increased     4)Foreign body on abdominal Xray 2 weeks ago     Presumed to be in the small bowels     Not appreciated on Ct abdomen this time     #DVT px: Lovenox SC 62 yo M with PMH of cerebral palsy from group home, seizure disorder, spastic paraplegia, s/p PEG tube, BPH, bladder neck stricture s/p suprapubic cath hx of ESBL -proteus and pseudomonas, asthma, nephrolithiasis, chronic constipation was sent in from group home for PEG tube malfunction evaluation- suprapubic catheter pain and urinary syx    1) malfunctioned Peg tube   Ct abd/pelvis with contrast study   GI evaluation of PEG   for now will hold on using the PEG     2)dysuria - pain at suprapubic catheter site     Urology evaluation  might need replacement     will cover for UTI with Ciprofloxacin - fu Ucx and sensitivities     recent Pseudomonas UTI sensitive to cipro     3)Cerebral palsy and hx of seizures     Keep Phenobarbital     4)Chronic constipation     But patient had a BM this morning as he stated --though on exam - distended - suspect big stool burden     Will keep on Bowel regimen , Lactulose frequency to be increased as needed    #feeds held for now until CT scan done   #DVT px: Lovenox SC 64 yo M with PMH of cerebral palsy from group home, seizure disorder, spastic paraplegia, s/p PEG tube, BPH, bladder neck stricture s/p suprapubic cath hx of ESBL -proteus and pseudomonas, asthma, nephrolithiasis, chronic constipation was sent in from group home for PEG tube malfunction evaluation- suprapubic catheter pain and urinary syx    1) malfunctioned Peg tube   Ct abd/pelvis with contrast study   GI evaluation of PEG   for now will hold on using the PEG     2)dysuria - pain at suprapubic catheter site     Urology evaluation  might need replacement     will cover for UTI with Ciprofloxacin - fu Ucx and sensitivities     recent Pseudomonas UTI sensitive to cipro     3)Cerebral palsy and hx of seizures     Keep Phenobarbital     4)Chronic constipation     But patient had a BM this morning as he stated --though on exam - distended - suspect big stool burden     Will keep on Bowel regimen , Lactulose frequency to be increased as needed    5) spastic paraplegia  continue baclofen and home meds     #feeds held for now until CT scan done and PEG tube position checked started on NSS   #DVT px: Lovenox SC 64 yo M with PMH of cerebral palsy from group home, seizure disorder, spastic paraplegia, s/p PEG tube, BPH, bladder neck stricture s/p suprapubic cath hx of ESBL -proteus and pseudomonas, asthma, nephrolithiasis, chronic constipation was sent in from group home for PEG tube malfunction evaluation- suprapubic catheter pain and urinary syx    1) malfunctioned Peg tube   Ct abd/pelvis with contrast study   GI evaluation of PEG   for now will hold on using the PEG     2)dysuria - pain at suprapubic catheter site     Urology evaluation  might need replacement     will cover for UTI with Ciprofloxacin - fu Ucx and sensitivities     recent Pseudomonas UTI sensitive to cipro     3)Cerebral palsy and hx of seizures     Keep Phenobarbital     4)Chronic constipation     But patient had a BM this morning as he stated --though on exam - distended - suspect big stool burden     Follow up CT Scan to rule out impaction/ ileus -    Will keep on Bowel regimen , Lactulose frequency to be increased as needed    5) spastic paraplegia  continue baclofen and home meds     #feeds held for now until CT scan done and PEG tube position checked started on NSS   #DVT px: Lovenox SC

## 2019-08-23 NOTE — ED PROVIDER NOTE - ATTENDING CONTRIBUTION TO CARE
I personally evaluated the patient. I reviewed the Resident’s or Physician Assistant’s note (as assigned above), and agree with the findings and plan except as documented in my note.     63 male chronically ill here for issues with his feeding tube patency. Chronically bed bound with home care agent with him in ED. Provides minimal HPI or complaints. Prior chart review reveals similar situation led to admission for PEG revision.     ROS unable to obtain    PE: cachectic male, appears chronically ill. ABD: soft non rigid, no guarding. PEG noted no cellultiis. NEURO: cognitively impaired and deconditioned. SKIN: no pallor    Impression: peg malfunction    Plan: IV labs imaging supportive care and reevaluation

## 2019-08-23 NOTE — ED PROVIDER NOTE - PROGRESS NOTE DETAILS
Cannot deflate the balloon to the feeding tube. Has had trouble in the past with balloon deflation. Will consult GI for tube replacement. Attempted to reach GI multiple times without success. Official page placed.

## 2019-08-23 NOTE — H&P ADULT - ATTENDING COMMENTS
#UTI, ascending R ureter on CT  cipro pending ucx  suprapubic cath replaced by uro  no sepsis  #PEG tub malfunction  ct abd with po contrast performed, f/u gi  hold peg feeds at this time

## 2019-08-24 LAB
ALBUMIN SERPL ELPH-MCNC: 3.9 G/DL — SIGNIFICANT CHANGE UP (ref 3.5–5.2)
ALP SERPL-CCNC: 95 U/L — SIGNIFICANT CHANGE UP (ref 30–115)
ALT FLD-CCNC: 15 U/L — SIGNIFICANT CHANGE UP (ref 0–41)
ANION GAP SERPL CALC-SCNC: 11 MMOL/L — SIGNIFICANT CHANGE UP (ref 7–14)
AST SERPL-CCNC: 21 U/L — SIGNIFICANT CHANGE UP (ref 0–41)
BASOPHILS # BLD AUTO: 0.02 K/UL — SIGNIFICANT CHANGE UP (ref 0–0.2)
BASOPHILS NFR BLD AUTO: 0.4 % — SIGNIFICANT CHANGE UP (ref 0–1)
BILIRUB SERPL-MCNC: 0.3 MG/DL — SIGNIFICANT CHANGE UP (ref 0.2–1.2)
BUN SERPL-MCNC: 15 MG/DL — SIGNIFICANT CHANGE UP (ref 10–20)
CALCIUM SERPL-MCNC: 9.1 MG/DL — SIGNIFICANT CHANGE UP (ref 8.5–10.1)
CHLORIDE SERPL-SCNC: 107 MMOL/L — SIGNIFICANT CHANGE UP (ref 98–110)
CO2 SERPL-SCNC: 26 MMOL/L — SIGNIFICANT CHANGE UP (ref 17–32)
CREAT SERPL-MCNC: 0.7 MG/DL — SIGNIFICANT CHANGE UP (ref 0.7–1.5)
CULTURE RESULTS: SIGNIFICANT CHANGE UP
EOSINOPHIL # BLD AUTO: 0.29 K/UL — SIGNIFICANT CHANGE UP (ref 0–0.7)
EOSINOPHIL NFR BLD AUTO: 5.8 % — SIGNIFICANT CHANGE UP (ref 0–8)
GLUCOSE SERPL-MCNC: 87 MG/DL — SIGNIFICANT CHANGE UP (ref 70–99)
HCT VFR BLD CALC: 38.9 % — LOW (ref 42–52)
HGB BLD-MCNC: 13 G/DL — LOW (ref 14–18)
IMM GRANULOCYTES NFR BLD AUTO: 0.2 % — SIGNIFICANT CHANGE UP (ref 0.1–0.3)
LYMPHOCYTES # BLD AUTO: 1.12 K/UL — LOW (ref 1.2–3.4)
LYMPHOCYTES # BLD AUTO: 22.4 % — SIGNIFICANT CHANGE UP (ref 20.5–51.1)
MAGNESIUM SERPL-MCNC: 2 MG/DL — SIGNIFICANT CHANGE UP (ref 1.8–2.4)
MCHC RBC-ENTMCNC: 31 PG — SIGNIFICANT CHANGE UP (ref 27–31)
MCHC RBC-ENTMCNC: 33.4 G/DL — SIGNIFICANT CHANGE UP (ref 32–37)
MCV RBC AUTO: 92.8 FL — SIGNIFICANT CHANGE UP (ref 80–94)
MONOCYTES # BLD AUTO: 0.68 K/UL — HIGH (ref 0.1–0.6)
MONOCYTES NFR BLD AUTO: 13.6 % — HIGH (ref 1.7–9.3)
NEUTROPHILS # BLD AUTO: 2.87 K/UL — SIGNIFICANT CHANGE UP (ref 1.4–6.5)
NEUTROPHILS NFR BLD AUTO: 57.6 % — SIGNIFICANT CHANGE UP (ref 42.2–75.2)
NRBC # BLD: 0 /100 WBCS — SIGNIFICANT CHANGE UP (ref 0–0)
PLATELET # BLD AUTO: 163 K/UL — SIGNIFICANT CHANGE UP (ref 130–400)
POTASSIUM SERPL-MCNC: 4.3 MMOL/L — SIGNIFICANT CHANGE UP (ref 3.5–5)
POTASSIUM SERPL-SCNC: 4.3 MMOL/L — SIGNIFICANT CHANGE UP (ref 3.5–5)
PROT SERPL-MCNC: 6.6 G/DL — SIGNIFICANT CHANGE UP (ref 6–8)
RBC # BLD: 4.19 M/UL — LOW (ref 4.7–6.1)
RBC # FLD: 13.6 % — SIGNIFICANT CHANGE UP (ref 11.5–14.5)
SODIUM SERPL-SCNC: 144 MMOL/L — SIGNIFICANT CHANGE UP (ref 135–146)
SPECIMEN SOURCE: SIGNIFICANT CHANGE UP
WBC # BLD: 4.99 K/UL — SIGNIFICANT CHANGE UP (ref 4.8–10.8)
WBC # FLD AUTO: 4.99 K/UL — SIGNIFICANT CHANGE UP (ref 4.8–10.8)

## 2019-08-24 PROCEDURE — 99223 1ST HOSP IP/OBS HIGH 75: CPT | Mod: AI

## 2019-08-24 PROCEDURE — 71045 X-RAY EXAM CHEST 1 VIEW: CPT | Mod: 26

## 2019-08-24 PROCEDURE — 99222 1ST HOSP IP/OBS MODERATE 55: CPT

## 2019-08-24 RX ORDER — PHENOBARBITAL 60 MG
64.8 TABLET ORAL DAILY
Refills: 0 | Status: DISCONTINUED | OUTPATIENT
Start: 2019-08-24 | End: 2019-08-26

## 2019-08-24 RX ORDER — DOCUSATE SODIUM 100 MG
50 CAPSULE ORAL
Refills: 0 | Status: DISCONTINUED | OUTPATIENT
Start: 2019-08-24 | End: 2019-08-26

## 2019-08-24 RX ORDER — BENZOCAINE 10 %
1 GEL (GRAM) MUCOUS MEMBRANE ONCE
Refills: 0 | Status: DISCONTINUED | OUTPATIENT
Start: 2019-08-24 | End: 2019-08-24

## 2019-08-24 RX ADMIN — CHLORHEXIDINE GLUCONATE 1 APPLICATION(S): 213 SOLUTION TOPICAL at 05:29

## 2019-08-24 RX ADMIN — Medication 1 DROP(S): at 12:21

## 2019-08-24 RX ADMIN — Medication 1 DROP(S): at 00:30

## 2019-08-24 RX ADMIN — SENNA PLUS 2 TABLET(S): 8.6 TABLET ORAL at 21:16

## 2019-08-24 RX ADMIN — Medication 1 DROP(S): at 17:57

## 2019-08-24 RX ADMIN — Medication 200 MILLIGRAM(S): at 17:56

## 2019-08-24 RX ADMIN — Medication 404 MILLIGRAM(S): at 12:21

## 2019-08-24 RX ADMIN — Medication 20 MILLIGRAM(S): at 15:16

## 2019-08-24 RX ADMIN — Medication 404 MILLIGRAM(S): at 03:28

## 2019-08-24 RX ADMIN — ENOXAPARIN SODIUM 40 MILLIGRAM(S): 100 INJECTION SUBCUTANEOUS at 12:19

## 2019-08-24 RX ADMIN — SODIUM CHLORIDE 50 MILLILITER(S): 9 INJECTION INTRAMUSCULAR; INTRAVENOUS; SUBCUTANEOUS at 12:22

## 2019-08-24 RX ADMIN — Medication 20 MILLIGRAM(S): at 21:16

## 2019-08-24 RX ADMIN — Medication 1 DROP(S): at 23:09

## 2019-08-24 RX ADMIN — Medication 1 DROP(S): at 05:30

## 2019-08-24 RX ADMIN — Medication 200 MILLIGRAM(S): at 05:01

## 2019-08-24 RX ADMIN — Medication 50 MILLIGRAM(S): at 17:56

## 2019-08-24 RX ADMIN — Medication 1 TABLET(S): at 12:19

## 2019-08-24 NOTE — CONSULT NOTE ADULT - SUBJECTIVE AND OBJECTIVE BOX
Patient is a 63y old  Male who presents with a chief complaint of PEG tube malfunctioning (23 Aug 2019 23:30)    UROLOGY: Pt is a 64y/o M with PMH CP, BPH s/p SPT placement presenting from NH  PEG malfunction and irritation by SPT site. SPT was last changed one month ago  by Urology. Unable to get history from pt. Aide at bedside, states that he had some pain by the cystostomy site. CT showing inflammatory changes to bladder and R ureter ?cystitis/pyelonephritis.    HPI:  64 yo M with PMH of cerebral palsy from group home, seizure disorder, spastic paraplegia, s/p PEG tube, BPH, bladder neck stricture s/p suprapubic cath hx of ESBL -proteus and pseudomonas , asthma, nephrolithiasis, chronic constipation was sent in from group [place] for PEG tube malfunction evaluation. Patient is complaining of urinary symptoms and pain at site of the PEG and urinary catheter- he denies any severe abd pain- no nausea - no vomiting.  Denies any associated fever or chills , no recent URI syx     in ED vitals were as 143/77 mmHg - /min RR nl and afebrile, saturating 95% on room air   labs were non - significant     CXR done showed   Stable left hemidiaphragm elevation with associated left lung base   opacity which may reflect compressive atelectasis.    Urine Microscopic-Add On (NC) (19 @ 10:57)    Triple Phosphate Crystals: Many    Red Blood Cell - Urine: 145 /HPF    White Blood Cell - Urine: 30 /HPF    Hyaline Casts: 5 /LPF    Bacteria: Many    Epithelial Cells: 1 /HPF    Comment - Urine: amorphous phosphate = many (23 Aug 2019 17:19)      PAST MEDICAL & SURGICAL HISTORY:  Asthma  Urinary retention  Urinary calculi  Spastic quadriplegia  Osteoporosis  Seizure: last seizure &gt;10 years ago  Cerebral palsy  BPH (benign prostatic hyperplasia)  Suprapubic catheter  H/O cystoscopy  S/P percutaneous endoscopic gastrostomy (PEG) tube placement      REVIEW OF SYSTEMS:    CONSTITUTIONAL:  fevers or chills  HEENT: No visual changes  ENDO: No sweating  NECK: No pain or stiffness  MUSCULOSKELETAL: No back pain, no joint pain  RESPIRATORY: No shortness of breath  CARDIOVASCULAR: No chest pain  GASTROINTESTINAL: No abdominal or epigastric pain. No nausea, vomiting,  No diarrhea or constipation.   NEUROLOGICAL: No mental status changes  PSYCH: No depression, no mood changes  SKIN: No itching      MEDICATIONS  (STANDING):  ALBUTerol    0.083%. 2.5 milliGRAM(s) Nebulizer once  baclofen 20 milliGRAM(s) Oral three times a day  calcium carbonate   1250 mG (OsCal) 1 Tablet(s) Oral daily  chlorhexidine 4% Liquid 1 Application(s) Topical <User Schedule>  ciprofloxacin   IVPB 400 milliGRAM(s) IV Intermittent every 12 hours  docusate sodium 100 milliGRAM(s) Oral daily  enoxaparin Injectable 40 milliGRAM(s) SubCutaneous daily  PHENobarbital IVPB 65 milliGRAM(s) IV Intermittent daily  polyvinyl alcohol 1.4%/povidone 0.6% Ophthalmic Solution - Peds 1 Drop(s) Both EYES four times a day  senna 2 Tablet(s) Oral at bedtime  sodium chloride 0.9%. 1000 milliLiter(s) (50 mL/Hr) IV Continuous <Continuous>    MEDICATIONS  (PRN):  ALBUTerol    90 MICROgram(s) HFA Inhaler 2 Puff(s) Inhalation every 6 hours PRN Bronchospasm      Allergies  No Known Allergies      SOCIAL HISTORY: No illicit drug use    FAMILY HISTORY:  Family history unknown    Vital Signs Last 24 Hrs  T(C): 36.1 (23 Aug 2019 21:00), Max: 37.5 (23 Aug 2019 10:07)  T(F): 97 (23 Aug 2019 21:00), Max: 99.5 (23 Aug 2019 10:07)  HR: 82 (23 Aug 2019 21:00) (82 - 105)  BP: 110/68 (23 Aug 2019 21:00) (102/78 - 143/77)  RR: 17 (23 Aug 2019 21:00) (17 - 18)  SpO2: 96% (23 Aug 2019 21:00) (95% - 98%)    PHYSICAL EXAM:    Constitutional: NAD, well-developed, contracted   HEENT: EOMI  Neck: no pain  Back: No CVA tenderness  Respiratory: No accessory respiratory muscle use  Abd: soft, nontender, nondistended.  : +22Fr SPT in place draining dark yellow urine, bladder nonpalpable     I&O's Summary    23 Aug 2019 07:01  -  24 Aug 2019 02:28  --------------------------------------------------------  IN: 0 mL / OUT: 200 mL / NET: -200 mL        LABS:                        14.2   6.48  )-----------( 175      ( 23 Aug 2019 10:57 )             41.6         144  |  105  |  15  ----------------------------<  90  4.0   |  28  |  0.6<L>    Ca    9.6      23 Aug 2019 10:57    TPro  7.5  /  Alb  4.2  /  TBili  0.3  /  DBili  x   /  AST  23  /  ALT  17  /  AlkPhos  105      PT/INR - ( 23 Aug 2019 10:57 )   PT: 12.20 sec;   INR: 1.06 ratio         PTT - ( 23 Aug 2019 10:57 )  PTT:37.2 sec  Urinalysis Basic - ( 23 Aug 2019 10:57 )    Color: Yellow / Appearance: Turbid / S.013 / pH: x  Gluc: x / Ketone: Negative  / Bili: Negative / Urobili: <2 mg/dL   Blood: x / Protein: 300 mg/dL / Nitrite: Positive   Leuk Esterase: Small / RBC: 145 /HPF / WBC 30 /HPF   Sq Epi: x / Non Sq Epi: 1 /HPF / Bacteria: Many      Urine Culture:         RADIOLOGY & ADDITIONAL STUDIES:  < from: CT Abdomen and Pelvis w/ IV Cont (19 @ 23:02) >    ******PRELIMINARY REPORT******    ******PRELIMINARY REPORT******          EXAM:  CT ABDOMEN AND PELVIS IC            PROCEDURE DATE:  2019          ******PRELIMINARY REPORT******    ******PRELIMINARY REPORT******          INTERPRETATION:  CLINICAL HISTORY / REASON FOR EXAM: Malfunctioning PEG   and suprapubic catheter.    TECHNIQUE: Contiguous axial CT images were obtained from the lower chest   to the pubic symphysis following administration of 100 mL Optiray 320   intravenous contrast. Oral contrast was not administered. Reformatted   images in the coronal and sagittal planes were acquired.    COMPARISON CT: CT abdomen and pelvis from 2019    OTHER STUDIES USED FOR CORRELATION: None.       FINDINGS:    LOWER CHEST: Bibasilar dependent atelectasis. Stable elevation of the   left hemidiaphragm.    HEPATOBILIARY:  Unremarkable.    SPLEEN: Unremarkable.    PANCREAS: Unremarkable.    ADRENAL GLANDS: Unremarkable.    KIDNEYS: Urothelial cell enhancement of the right ureter. Symmetric   enhancement bilaterally. No evidence of hydronephrosis.    ABDOMINOPELVIC NODES: Unremarkable.    PELVIC ORGANS: Status post suprapubic catheter with chronically thickened   urinary bladder wall with urothelial enhancement and surrounding   inflammatory changes. Suprapubic catheter tip is deviating rightward to   the level of the ureterovesicular junction, stable.    PERITONEUM/MESENTERY/BOWEL: Post gastrostomy tube with distal tip within   the gastric lumen. Metallic clip at the cecum.No bowel obstruction,   ascites or intraperitoneal free air.    BONES/SOFT TISSUES: Diffuse osteopenia. Degenerative changes of   thoracolumbar spine. Bilateral fat-containing inguinal hernias.    VASCULAR: Aorta is normal in caliber.      IMPRESSION:    Inflammatory changes surrounding the bladder and right ureter urothelial   cell enhancement. Findings can be seen with cystitis or pyelonephritis.   Recommend correlation with urinalysis.    Intact gastrostomy tube with distal tip within the gastric lumen.            ******PRELIMINARY REPORT******    ******PRELIMINARY REPORT******          ALBA BLANCHARD M.D., RESIDENT RADIOLOGIST

## 2019-08-24 NOTE — CONSULT NOTE ADULT - ASSESSMENT
64y/o M with pain around SPT site, LUTS, CT showing possible cystitis, +UA nitrites, due for SPT change.     - Removed 22Fr SPT, under sterile technique replaced a 22Fr Edouard catheter into cystostomy site and placed 10cc water into balloon with +return of 30cc urine. Flushed catheter with 40cc sterile water with +return of 40cc urine. Pt tolerated this procedure well.   - F/u UCx   - Will d/w attending.

## 2019-08-25 LAB
CULTURE RESULTS: SIGNIFICANT CHANGE UP
SPECIMEN SOURCE: SIGNIFICANT CHANGE UP

## 2019-08-25 PROCEDURE — 99233 SBSQ HOSP IP/OBS HIGH 50: CPT

## 2019-08-25 RX ADMIN — Medication 20 MILLIGRAM(S): at 21:39

## 2019-08-25 RX ADMIN — Medication 200 MILLIGRAM(S): at 06:01

## 2019-08-25 RX ADMIN — CHLORHEXIDINE GLUCONATE 1 APPLICATION(S): 213 SOLUTION TOPICAL at 06:02

## 2019-08-25 RX ADMIN — Medication 64.8 MILLIGRAM(S): at 12:23

## 2019-08-25 RX ADMIN — Medication 200 MILLIGRAM(S): at 17:42

## 2019-08-25 RX ADMIN — SENNA PLUS 2 TABLET(S): 8.6 TABLET ORAL at 21:40

## 2019-08-25 RX ADMIN — Medication 1 DROP(S): at 17:38

## 2019-08-25 RX ADMIN — Medication 20 MILLIGRAM(S): at 06:00

## 2019-08-25 RX ADMIN — Medication 20 MILLIGRAM(S): at 17:36

## 2019-08-25 RX ADMIN — Medication 1 DROP(S): at 06:02

## 2019-08-25 RX ADMIN — Medication 50 MILLIGRAM(S): at 17:39

## 2019-08-25 RX ADMIN — ENOXAPARIN SODIUM 40 MILLIGRAM(S): 100 INJECTION SUBCUTANEOUS at 12:25

## 2019-08-25 RX ADMIN — Medication 1 DROP(S): at 23:15

## 2019-08-25 RX ADMIN — Medication 50 MILLIGRAM(S): at 06:00

## 2019-08-25 RX ADMIN — Medication 1 TABLET(S): at 12:24

## 2019-08-25 NOTE — DIETITIAN INITIAL EVALUATION ADULT. - ENTERAL
1.Recommend increase the volume of TF with Jevity 1.2 to 300mL Q6hrs, to provide (1440kcal, 67gm pro, 972mL H2O)

## 2019-08-25 NOTE — DIETITIAN INITIAL EVALUATION ADULT. - RD TO REMAIN AVAILABLE
Intervention:1.Enteral Nutrition   Monitor/Evaluate: 1.Diet order, energy intake, nutrition focused physical findings, anemia profile/yes

## 2019-08-25 NOTE — DIETITIAN INITIAL EVALUATION ADULT. - ENERGY NEEDS
Estimated Calorie Needs: 1308-1569kcal/day (25-30kcal/kg of admit weight)  Estimated Protein Needs: 52-68grams/day (1-1.3grams/kg of admit weight)  Estimated Fluid Needs: 1308-1569mL/day (1mL/kcal)

## 2019-08-25 NOTE — DIETITIAN INITIAL EVALUATION ADULT. - OTHER INFO
Dx: 62y/o male with pmhx noted above Dx: 64y/o male with pmhx noted above presented with a malfunctioning PEG tube (resolved) and suprapubic pain, found to have ascending UTI. Skin is intact (Awais Score-14).      Subjective Data: I spoke to the group home aide, who was present. Per aide, the patient received TF at a group home with Jevity 1.2 Q8hrs but was not able to provide the specific volume. The patient's height could not be obtained.

## 2019-08-25 NOTE — PROGRESS NOTE ADULT - SUBJECTIVE AND OBJECTIVE BOX
Patient is a 63y old  Male who presents with a chief complaint of PEG tube malfunctioning (23 Aug 2019 23:30)      SUBJECTIVE / OVERNIGHT EVENTS:  no cp, sob, abd pain, fever  no abd pain, dysuria, fever, cva tenderness    MEDICATIONS  (STANDING):  ALBUTerol    0.083%. 2.5 milliGRAM(s) Nebulizer once  baclofen 20 milliGRAM(s) Oral three times a day  calcium carbonate   1250 mG (OsCal) 1 Tablet(s) Oral daily  chlorhexidine 4% Liquid 1 Application(s) Topical <User Schedule>  ciprofloxacin   IVPB 400 milliGRAM(s) IV Intermittent every 12 hours  docusate sodium Liquid 50 milliGRAM(s) Oral two times a day  enoxaparin Injectable 40 milliGRAM(s) SubCutaneous daily  PHENobarbital 64.8 milliGRAM(s) Oral daily  polyvinyl alcohol 1.4%/povidone 0.6% Ophthalmic Solution - Peds 1 Drop(s) Both EYES four times a day  senna 2 Tablet(s) Oral at bedtime  sodium chloride 0.9%. 1000 milliLiter(s) (50 mL/Hr) IV Continuous <Continuous>    MEDICATIONS  (PRN):  ALBUTerol    90 MICROgram(s) HFA Inhaler 2 Puff(s) Inhalation every 6 hours PRN Bronchospasm        CAPILLARY BLOOD GLUCOSE        I&O's Summary    24 Aug 2019 07:01  -  25 Aug 2019 07:00  --------------------------------------------------------  IN: 340 mL / OUT: 1100 mL / NET: -760 mL        PHYSICAL EXAM:  GENERAL: nad, resting comfortably in bed  EYES:  NECK:   CHEST/LUNG: ctab no w/r/r  HEART: rrr no m/g/r  ABDOMEN: soft nt nd  EXTREMITIES:  no edema bl  PSYCH:   NEUROLOGY:   SKIN:    LABS:                        13.0   4.99  )-----------( 163      ( 24 Aug 2019 07:11 )             38.9         144  |  107  |  15  ----------------------------<  87  4.3   |  26  |  0.7    Ca    9.1      24 Aug 2019 07:11  Mg     2.0     -24    TPro  6.6  /  Alb  3.9  /  TBili  0.3  /  DBili  x   /  AST  21  /  ALT  15  /  AlkPhos  95  08-    PT/INR - ( 23 Aug 2019 10:57 )   PT: 12.20 sec;   INR: 1.06 ratio         PTT - ( 23 Aug 2019 10:57 )  PTT:37.2 sec      Urinalysis Basic - ( 23 Aug 2019 10:57 )    Color: Yellow / Appearance: Turbid / S.013 / pH: x  Gluc: x / Ketone: Negative  / Bili: Negative / Urobili: <2 mg/dL   Blood: x / Protein: 300 mg/dL / Nitrite: Positive   Leuk Esterase: Small / RBC: 145 /HPF / WBC 30 /HPF   Sq Epi: x / Non Sq Epi: 1 /HPF / Bacteria: Many        RADIOLOGY & ADDITIONAL TESTS:    Imaging Personally Reviewed:  Yes    Consultant(s) Notes Reviewed:      Care Discussed with Consultants/Other Providers:    Assessment and Plan   PAST MEDICAL & SURGICAL HISTORY:  Asthma  Urinary retention  Urinary calculi  Spastic quadriplegia  Osteoporosis  Seizure: last seizure &gt;10 years ago  Cerebral palsy  BPH (benign prostatic hyperplasia)  Suprapubic catheter  History of suprapubic catheter  H/O cystoscopy  S/P percutaneous endoscopic gastrostomy (PEG) tube placement

## 2019-08-25 NOTE — PROGRESS NOTE ADULT - ASSESSMENT
63M PMHx cerebral palsy, s/p PEG, seizure disorder, BPH, bladder neck stricture s/p suprapubic cath, asthma here with suprapubic pain found to have ascending uti. PEG tube malfunction.    #Ascending UTI, suprapubic cath replaced by uro  ucx contaminated, repeat from suprapubic  cont cipro, ucx may be sterile now, plan for 7 day course  discharge planning  no sepsis  #PEG tube malfunction, resolved  PEG functioning well per gi  d/c ivf  tf via peg  #Cerebral palsy  baclofen 20 tid  #Seizure disroder  phenobarb  #BPH  outpt f/u  #Asthma  controlled off medications  #DVT ppx  lovenox    #Progress Note Handoff:  Pending (specify):  Consults_________, Tests________, Test Results_______, Other____ucx_____  Family discussion:d/w pt and aid at bedside  Disposition: Home___/SNF___/Other_____group home___/Unknown at this time________

## 2019-08-26 ENCOUNTER — TRANSCRIPTION ENCOUNTER (OUTPATIENT)
Age: 64
End: 2019-08-26

## 2019-08-26 VITALS — SYSTOLIC BLOOD PRESSURE: 102 MMHG | DIASTOLIC BLOOD PRESSURE: 58 MMHG

## 2019-08-26 LAB
ANION GAP SERPL CALC-SCNC: 11 MMOL/L — SIGNIFICANT CHANGE UP (ref 7–14)
BUN SERPL-MCNC: 9 MG/DL — LOW (ref 10–20)
CALCIUM SERPL-MCNC: 9.2 MG/DL — SIGNIFICANT CHANGE UP (ref 8.5–10.1)
CHLORIDE SERPL-SCNC: 106 MMOL/L — SIGNIFICANT CHANGE UP (ref 98–110)
CO2 SERPL-SCNC: 25 MMOL/L — SIGNIFICANT CHANGE UP (ref 17–32)
CREAT SERPL-MCNC: 0.6 MG/DL — LOW (ref 0.7–1.5)
GLUCOSE SERPL-MCNC: 150 MG/DL — HIGH (ref 70–99)
HCT VFR BLD CALC: 39.5 % — LOW (ref 42–52)
HGB BLD-MCNC: 13.5 G/DL — LOW (ref 14–18)
MCHC RBC-ENTMCNC: 31.5 PG — HIGH (ref 27–31)
MCHC RBC-ENTMCNC: 34.2 G/DL — SIGNIFICANT CHANGE UP (ref 32–37)
MCV RBC AUTO: 92.1 FL — SIGNIFICANT CHANGE UP (ref 80–94)
NRBC # BLD: 0 /100 WBCS — SIGNIFICANT CHANGE UP (ref 0–0)
PLATELET # BLD AUTO: 171 K/UL — SIGNIFICANT CHANGE UP (ref 130–400)
POTASSIUM SERPL-MCNC: 4.1 MMOL/L — SIGNIFICANT CHANGE UP (ref 3.5–5)
POTASSIUM SERPL-SCNC: 4.1 MMOL/L — SIGNIFICANT CHANGE UP (ref 3.5–5)
RBC # BLD: 4.29 M/UL — LOW (ref 4.7–6.1)
RBC # FLD: 13.3 % — SIGNIFICANT CHANGE UP (ref 11.5–14.5)
SODIUM SERPL-SCNC: 142 MMOL/L — SIGNIFICANT CHANGE UP (ref 135–146)
WBC # BLD: 4.06 K/UL — LOW (ref 4.8–10.8)
WBC # FLD AUTO: 4.06 K/UL — LOW (ref 4.8–10.8)

## 2019-08-26 PROCEDURE — 99239 HOSP IP/OBS DSCHRG MGMT >30: CPT

## 2019-08-26 RX ORDER — CIPROFLOXACIN LACTATE 400MG/40ML
1 VIAL (ML) INTRAVENOUS
Qty: 7 | Refills: 0
Start: 2019-08-26 | End: 2019-09-01

## 2019-08-26 RX ORDER — LACTULOSE 10 G/15ML
30 SOLUTION ORAL
Qty: 0 | Refills: 0 | DISCHARGE

## 2019-08-26 RX ORDER — CIPROFLOXACIN LACTATE 400MG/40ML
1 VIAL (ML) INTRAVENOUS
Qty: 14 | Refills: 0
Start: 2019-08-26 | End: 2019-09-01

## 2019-08-26 RX ORDER — SENNA PLUS 8.6 MG/1
2 TABLET ORAL
Qty: 60 | Refills: 0
Start: 2019-08-26 | End: 2019-09-24

## 2019-08-26 RX ADMIN — Medication 20 MILLIGRAM(S): at 05:58

## 2019-08-26 RX ADMIN — Medication 64.8 MILLIGRAM(S): at 14:01

## 2019-08-26 RX ADMIN — Medication 1 DROP(S): at 14:02

## 2019-08-26 RX ADMIN — Medication 50 MILLIGRAM(S): at 05:58

## 2019-08-26 RX ADMIN — Medication 20 MILLIGRAM(S): at 14:02

## 2019-08-26 RX ADMIN — Medication 200 MILLIGRAM(S): at 05:57

## 2019-08-26 RX ADMIN — CHLORHEXIDINE GLUCONATE 1 APPLICATION(S): 213 SOLUTION TOPICAL at 05:58

## 2019-08-26 RX ADMIN — Medication 1 TABLET(S): at 14:01

## 2019-08-26 RX ADMIN — ENOXAPARIN SODIUM 40 MILLIGRAM(S): 100 INJECTION SUBCUTANEOUS at 14:02

## 2019-08-26 NOTE — PROGRESS NOTE ADULT - SUBJECTIVE AND OBJECTIVE BOX
Patient is a 63y old  Male who presents with a chief complaint of UTI (26 Aug 2019 09:12)      SUBJECTIVE / OVERNIGHT EVENTS:  no cp, sob, abd pain, fever    MEDICATIONS  (STANDING):  ALBUTerol    0.083%. 2.5 milliGRAM(s) Nebulizer once  baclofen 20 milliGRAM(s) Oral three times a day  calcium carbonate   1250 mG (OsCal) 1 Tablet(s) Oral daily  chlorhexidine 4% Liquid 1 Application(s) Topical <User Schedule>  ciprofloxacin   IVPB 400 milliGRAM(s) IV Intermittent every 12 hours  docusate sodium Liquid 50 milliGRAM(s) Oral two times a day  enoxaparin Injectable 40 milliGRAM(s) SubCutaneous daily  PHENobarbital 64.8 milliGRAM(s) Oral daily  polyvinyl alcohol 1.4%/povidone 0.6% Ophthalmic Solution - Peds 1 Drop(s) Both EYES four times a day  senna 2 Tablet(s) Oral at bedtime    MEDICATIONS  (PRN):  ALBUTerol    90 MICROgram(s) HFA Inhaler 2 Puff(s) Inhalation every 6 hours PRN Bronchospasm        CAPILLARY BLOOD GLUCOSE        I&O's Summary    25 Aug 2019 07:01  -  26 Aug 2019 07:00  --------------------------------------------------------  IN: 0 mL / OUT: 1700 mL / NET: -1700 mL        PHYSICAL EXAM:  GENERAL: nad, resting comfortably in bed  EYES:  NECK:   CHEST/LUNG: ctab no w/r/r  HEART: rrr no m/g/r  ABDOMEN: soft nt nd  EXTREMITIES:  no edema bl  PSYCH:   NEUROLOGY:   SKIN:    LABS:                        13.5   4.06  )-----------( 171      ( 26 Aug 2019 06:37 )             39.5     08-26    142  |  106  |  9<L>  ----------------------------<  150<H>  4.1   |  25  |  0.6<L>    Ca    9.2      26 Aug 2019 06:37                RADIOLOGY & ADDITIONAL TESTS:    Imaging Personally Reviewed:  Yes    Consultant(s) Notes Reviewed:      Care Discussed with Consultants/Other Providers:    Assessment and Plan   PAST MEDICAL & SURGICAL HISTORY:  Asthma  Urinary retention  Urinary calculi  Spastic quadriplegia  Osteoporosis  Seizure: last seizure &gt;10 years ago  Cerebral palsy  BPH (benign prostatic hyperplasia)  Suprapubic catheter  History of suprapubic catheter  H/O cystoscopy  S/P percutaneous endoscopic gastrostomy (PEG) tube placement

## 2019-08-26 NOTE — DISCHARGE NOTE PROVIDER - NSDCCPCAREPLAN_GEN_ALL_CORE_FT
PRINCIPAL DISCHARGE DIAGNOSIS  Diagnosis: PEG tube malfunction  Assessment and Plan of Treatment: continue to use PEG tube,      SECONDARY DISCHARGE DIAGNOSES  Diagnosis: UTI (urinary tract infection)  Assessment and Plan of Treatment: continue with ciprofloxacin for 7 days

## 2019-08-26 NOTE — CHART NOTE - NSCHARTNOTEFT_GEN_A_CORE
<<<RESIDENT DISCHARGE NOTE>>>     TISH SHAIKH  MRN-[id number]    VITAL SIGNS:  T(F): 98.5 (08-26-19 @ 05:51), Max: 98.6 (08-25-19 @ 12:37)  HR: 63 (08-26-19 @ 05:51)  BP: 96/50 (08-26-19 @ 05:51)  SpO2: --      PHYSICAL EXAMINATION:  General: Nt in distress, answering questions   Head & Neck: atraumatic normocephalic   Pulmonary: normal air entry bilateral   Cardiovascular: normal s1, s2  Gastrointestinal/Abdomen & Pelvis: soft non tender    TEST RESULTS:                        13.5   4.06  )-----------( 171      ( 26 Aug 2019 06:37 )             39.5       08-26    142  |  106  |  9<L>  ----------------------------<  150<H>  4.1   |  25  |  0.6<L>    Ca    9.2      26 Aug 2019 06:37        FINAL DISCHARGE INTERVIEW:  Resident(s) Present: Ronnie CAPPS    DISPOSITION:  group home

## 2019-08-26 NOTE — DISCHARGE NOTE NURSING/CASE MANAGEMENT/SOCIAL WORK - NSDCDPATPORTLINK_GEN_ALL_CORE
You can access the [facility] Patient Portal, offered by [facility], by registering with the following website: [email or web address], [place]

## 2019-08-26 NOTE — DISCHARGE NOTE PROVIDER - NSDCFUSCHEDAPPT_GEN_ALL_CORE_FT
TISH SHAIKH ; 09/06/2019 ; P Urology [address] [place] [address] TISH SHAIKH ; 09/06/2019 ; P U[facility] [address] [place] [address] TISH SHAIKH ; 09/06/2019 ; P Urology [address] [place] [address] TISH SHAIKH ; 09/06/2019 ; P Urology [address] [place] [address]

## 2019-08-26 NOTE — PROGRESS NOTE ADULT - ASSESSMENT
63M PMHx cerebral palsy, s/p PEG, seizure disorder, BPH, bladder neck stricture s/p suprapubic cath, asthma here with suprapubic pain found to have ascending uti. PEG tube malfunction.    #Ascending UTI, suprapubic cath replaced by uro  repeat ucx clear  stable for d/c on cipro to complete 7 day course  #PEG tube malfunction, resolved  PEG functioning well per gi  tf via peg  #Cerebral palsy  baclofen 20 tid  #Seizure disroder  phenobarb  #BPH  outpt f/u  #Asthma  controlled off medications  #DVT ppx  lovenox

## 2019-08-26 NOTE — DISCHARGE NOTE PROVIDER - HOSPITAL COURSE
62 yo M with PMH of cerebral palsy from group home, seizure disorder, spastic paraplegia, s/p PEG tube, BPH, bladder neck stricture s/p suprapubic cath hx of ESBL -proteus and pseudomonas , asthma, nephrolithiasis, chronic constipation was sent in from group home for PEG tube malfunction evaluation.     Patient is complaining of urinary symptoms and pain at site of the PEG and urinary catheter- he denies any severe abd pain- no nausea - no vomiting.      Denies any associated fever or chills , no recent URI syx     in ED vitals were as 143/77 mmHg - /min RR nl and afebrile, saturating 95% on room air     labs were non - significant     CXR done showed     Stable left hemidiaphragm elevation with associated left lung base     opacity which may reflect compressive atelectasis.    UA was positive, Ucx was negative, started on Ciprofloxicin, wuill continue the course as out patient     Was evaluated by urology: Removed 22Fr SPT, replaced a 22Fr Edouard catheter into cystostomy site.     Evaluated by GI, Peg tube is working without any problem. 62 yo M with PMH of cerebral palsy from group home, seizure disorder, spastic paraplegia, s/p PEG tube, BPH, bladder neck stricture s/p suprapubic cath hx of ESBL -proteus and pseudomonas , asthma, nephrolithiasis, chronic constipation was sent in from group home for PEG tube malfunction evaluation.     Patient is complaining of urinary symptoms and pain at site of the PEG and urinary catheter- he denies any severe abd pain- no nausea - no vomiting.      Denies any associated fever or chills , no recent URI syx     in ED vitals were as 143/77 mmHg - /min RR nl and afebrile, saturating 95% on room air     labs were non - significant     CXR done showed     Stable left hemidiaphragm elevation with associated left lung base     opacity which may reflect compressive atelectasis.    UA was positive, Ucx was negative, started on Ciprofloxicin, wuill continue the course as out patient     Was evaluated by urology: Removed 22Fr SPT, replaced a 22Fr Edouard catheter into cystostomy site.     Evaluated by GI, Peg tube is working without any problem.         34 minutes spent on discharge planning

## 2019-08-27 ENCOUNTER — EMERGENCY (EMERGENCY)
Facility: HOSPITAL | Age: 64
LOS: 0 days | Discharge: HOME | End: 2019-08-27
Attending: EMERGENCY MEDICINE | Admitting: EMERGENCY MEDICINE
Payer: MEDICARE

## 2019-08-27 VITALS
RESPIRATION RATE: 16 BRPM | SYSTOLIC BLOOD PRESSURE: 112 MMHG | TEMPERATURE: 98 F | DIASTOLIC BLOOD PRESSURE: 89 MMHG | OXYGEN SATURATION: 98 % | HEART RATE: 83 BPM

## 2019-08-27 DIAGNOSIS — Z93.1 GASTROSTOMY STATUS: Chronic | ICD-10-CM

## 2019-08-27 DIAGNOSIS — K94.23 GASTROSTOMY MALFUNCTION: ICD-10-CM

## 2019-08-27 DIAGNOSIS — Z93.59 OTHER CYSTOSTOMY STATUS: Chronic | ICD-10-CM

## 2019-08-27 DIAGNOSIS — Z98.890 OTHER SPECIFIED POSTPROCEDURAL STATES: Chronic | ICD-10-CM

## 2019-08-27 PROCEDURE — 99282 EMERGENCY DEPT VISIT SF MDM: CPT | Mod: GC

## 2019-08-27 NOTE — ED PROVIDER NOTE - PHYSICAL EXAMINATION
VITAL SIGNS: I have reviewed nursing notes and confirm.  CONSTITUTIONAL: well-appearing, non-toxic, NAD  SKIN: Warm dry, normal skin turgor  HEAD: NCAT  EYES: EOMI, PERRLA, no scleral icterus  ENT: Moist mucous membranes, normal pharynx with no erythema or exudates  NECK: Supple; non tender. Full ROM. No cervical LAD  CARD: RRR, no murmurs, rubs or gallops  RESP: clear to ausculation b/l.  No rales, rhonchi, or wheezing.  ABD: soft, + BS, non-tender, non-distended, no rebound or guarding. No CVA tenderness. PEG tube in place, no fluctuance.   EXT: Full ROM, no bony tenderness, no pedal edema, no calf tenderness  NEURO: normal motor. normal sensory.   PSYCH: Cooperative, appropriate.

## 2019-08-27 NOTE — ED ADULT NURSE NOTE - OBJECTIVE STATEMENT
pt presents for g tube complications. pt has special needs and wheelchair bound at baseline. pt presents for g tube complications. pt has special needs and wheelchair bound at baseline. group home aid at bedside. aid states the top of tube fell off.

## 2019-08-27 NOTE — ED PROVIDER NOTE - CLINICAL SUMMARY MEDICAL DECISION MAKING FREE TEXT BOX
a/p; New adapter placed sent from endoscopy suite, dispo back to custodial, f/u with his PMD, strict return precautions provided. no need to confirm placement as the PEG tube did not actually come out, just the adapter at the tip.

## 2019-08-27 NOTE — ED ADULT NURSE NOTE - NS ED NOTE  TALK SOMEONE YN
"Abhijit Marie is a 62 y.o. black man with hypertension, history of cigarette smoking (quit 9/28/18), and chronic obstructive pulmonary disease with history of ICU admission for exacerbation. He lives in [place], [place]. He has a fiancee with whom he has a 15 month old daughter named Shruthi. He works for [employer].              He was seen at [facility] on 3/28/19 for a COPD exacerbation that began the day before after working in a house with dust and cat dander. His oxygen saturation was 90% with tachypnea and blood pressure of 224/128. He was given albuterol, ipratropium, methylprednisolone, and hydralazine and discharged home after feeling somewhat better. He still had shortness of breath. When he went to work, he told his fiancee that he was having hot sweats. When he returned home, he felt weak. His fiancee went out to get him something to eat and drink around 16:30. When his fiancee returned home, she found him on the floor unresponsive. She could barely feel his pulse. EMS was called and found him to have pulseless electrical activity, and CPR was initiated at 17:56 pm. Pulse was regained at 18:11 pm. He had 4 doses of epinephrine and 1 dose of sodium bicarbonate given via interosseous line, and 100 cc of saliva/vomitues was suctioned from his airway. He was intubated. In the emergency department, he was found to have lactic acidosis, hypercapnia, acute kidney injury, acute hepatic injury, rhabdomyolysis. He had no neurologic responses. He was admitted to [facility]. He displayed myoclonic activity.      * No surgery found *       Hospital Course:   He was put on therapeutic hypothermia and reached goal temperature of < 33°  C around 04:00 on 3/29/19. He was maintained at that temperature for 24 hours prior to starting the rewarming process. EEG showed "a burst suppressed background with generalized background suppressions lasting up to 5 min " "... consistent with a severe encephalopathy" with no epileptiform activity, consistent with anoxic brain injury. Repeat CT head showed "diffuse cerebral edema noting loss of gray-white matter differentiation and progressive loss of sulcation." EEG on 4/1/19 showed that the "overall degree of suppression with minimal variability and lack of reactivity is suggestive of a severe encephalopathy with guarded prognosis" with "no evidence of an epileptic process on this recording. Episodes of head jerking were not associated with any abnormality on EEG." Propofol was discontinued and he was started on dexmedetomidine for agitation to avoid the respiratory and neurologic suppression. Respiratory viral panel was positive for Enterovirus and Rhinovirus. Respiratory culture had no growth. Head CT showed diffuse cerebral edema so he was put on dexamethasone for a few days.     Palliative medicine met with family on today. Family had spoken with [facility] about patient current diagnosis/prognosis. Patient is intubated and unreponsive. He is not a candiate for trach/PEG. Patient not able to breath on his own and most of his reflexes are gone. Discussed with family comfort care and hospice care. Family decided, "Abhijit has suffered enough. He was a good brother and he had so much life in him." Terminal extubation to take place this evening. Patient to unstable for transfer to another facility. [facility]. Consult spiritual care.     " No

## 2019-08-27 NOTE — ED ADULT NURSE REASSESSMENT NOTE - NS ED NURSE REASSESS COMMENT FT1
Patient awake, alert to person and place. HHA @side. Pt in wheelchair. Tip of G tube came off. In no acute distress (no SOB, non-diaphoretic), breathing/conversing w/ease on RA. Waiting for replacement from endoscopy. Will continue to monitor/assess while awaiting MD Dispo.

## 2019-08-27 NOTE — ED PROVIDER NOTE - OBJECTIVE STATEMENT
62 yo M with PMH of cerebral palsy from group home, seizure disorder, spastic paraplegia, s/p PEG tube, BPH, bladder neck stricture s/p suprapubic cath hx of ESBL -proteus and pseudomonas , asthma, nephrolithiasis, chronic constipation was sent in from group home for PEG tube malfunction evaluation. End piece adapater clip ripped off when changing, no other complaints.

## 2019-08-27 NOTE — ED PROVIDER NOTE - PATIENT PORTAL LINK FT
You can access the FollowMyHealth Patient Portal offered by [facility] by registering at the following website: [email or web address]. By joining [employer]’s FollowMyHealth portal, you will also be able to view your health information using other applications (apps) compatible with our system.

## 2019-08-27 NOTE — ED ADULT NURSE NOTE - NSIMPLEMENTINTERV_GEN_ALL_ED
Implemented All Fall with Harm Risk Interventions:  [place] to call system. Call bell, personal items and telephone within reach. Instruct patient to call for assistance. Room bathroom lighting operational. Non-slip footwear when patient is off stretcher. Physically safe environment: no spills, clutter or unnecessary equipment. Stretcher in lowest position, wheels locked, appropriate side rails in place. Provide visual cue, wrist band, yellow gown, etc. Monitor gait and stability. Monitor for mental status changes and reorient to person, place, and time. Review medications for side effects contributing to fall risk. Reinforce activity limits and safety measures with patient and family. Provide visual clues: red socks.

## 2019-08-27 NOTE — ED PROVIDER NOTE - ATTENDING CONTRIBUTION TO CARE
63M PMH CP from group home, verbal, seizure disorder, spastic paraplegia, s/p PEG tube, BPH, bladder neck stricture s/p suprapubic cath, asthma, kidney stones, chronic constipation p/w PEG tube malfunction evaluation. End piece adapter clip ripped off at the very end of tube. has happened before and needed new adapter placed. PEG tube is in place and did not fall out. pt has no complaints. no abd pain, nvdc. no fever.     on exam, AFVSS, well dee nad, ncat, eomi, perrla, mmm, lctab, rrr nl s1s2 no mrg, abd soft ntnd, PEG tube in place, c/d/i, very tip of tube has plastic cover that ripped off adapter aaox3, no focal deficits, no le edema or calf ttp,     a/p; New adapter placed sent from endoscopy suite, dispo back to [facility], f/u with his PMD, strict return precautions provided. no need to confirm placement as the PEG tube did not actually come out, just the adapter at the tip.

## 2019-08-28 LAB
CULTURE RESULTS: SIGNIFICANT CHANGE UP
CULTURE RESULTS: SIGNIFICANT CHANGE UP
SPECIMEN SOURCE: SIGNIFICANT CHANGE UP
SPECIMEN SOURCE: SIGNIFICANT CHANGE UP

## 2019-09-04 DIAGNOSIS — N40.1 BENIGN PROSTATIC HYPERPLASIA WITH LOWER URINARY TRACT SYMPTOMS: ICD-10-CM

## 2019-09-04 DIAGNOSIS — N39.0 URINARY TRACT INFECTION, SITE NOT SPECIFIED: ICD-10-CM
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
## 2019-09-04 DIAGNOSIS — R33.8 OTHER RETENTION OF URINE: ICD-10-CM

## 2019-09-04 DIAGNOSIS — K94.23 GASTROSTOMY MALFUNCTION: ICD-10-CM

## 2019-09-04 DIAGNOSIS — N32.0 BLADDER-NECK OBSTRUCTION: ICD-10-CM

## 2019-09-04 DIAGNOSIS — J45.909 UNSPECIFIED ASTHMA, UNCOMPLICATED: ICD-10-CM

## 2019-09-04 DIAGNOSIS — G80.0 SPASTIC QUADRIPLEGIC CEREBRAL PALSY: ICD-10-CM

## 2019-09-04 DIAGNOSIS — K59.09 OTHER CONSTIPATION: ICD-10-CM

## 2019-09-04 DIAGNOSIS — G40.909 EPILEPSY, UNSPECIFIED, NOT INTRACTABLE, WITHOUT STATUS EPILEPTICUS: ICD-10-CM

## 2019-09-06 ENCOUNTER — APPOINTMENT (OUTPATIENT)
Dept: UROLOGY | Facility: CLINIC | Age: 64
End: 2019-09-06
Payer: MEDICARE

## 2019-09-06 DIAGNOSIS — N35.912 UNSPECIFIED BULBOUS URETHRAL STRICTURE, MALE: ICD-10-CM

## 2019-09-06 PROCEDURE — 51705 CHANGE OF BLADDER TUBE: CPT

## 2019-09-16 ENCOUNTER — APPOINTMENT (OUTPATIENT)
Dept: UROLOGY | Facility: CLINIC | Age: 64
End: 2019-09-16
Payer: MEDICARE

## 2019-09-16 PROCEDURE — 99213 OFFICE O/P EST LOW 20 MIN: CPT

## 2019-09-16 NOTE — PHYSICAL EXAM
[General Appearance - Well Nourished] : well nourished [General Appearance - Well Developed] : well developed [Normal Appearance] : normal appearance [Well Groomed] : well groomed [General Appearance - In No Acute Distress] : no acute distress [Abdomen Tenderness] : non-tender [Abdomen Soft] : soft [Costovertebral Angle Tenderness] : no ~M costovertebral angle tenderness [Edema] : no peripheral edema [] : no respiratory distress [Respiration, Rhythm And Depth] : normal respiratory rhythm and effort [Exaggerated Use Of Accessory Muscles For Inspiration] : no accessory muscle use [Oriented To Time, Place, And Person] : oriented to person, place, and time [Affect] : the affect was normal [Mood] : the mood was normal [Not Anxious] : not anxious [Normal Station and Gait] : the gait and station were normal for the patient's age [No Focal Deficits] : no focal deficits [No Palpable Adenopathy] : no palpable adenopathy

## 2019-09-20 NOTE — HISTORY OF PRESENT ILLNESS
[Currently Experiencing ___] :  [unfilled] [Urinary Retention] : urinary retention [None] : None [FreeTextEntry1] : TISH SHAIKH is a 63 year old male with a past medical history of CP and urethral stricture . Presents to the office today for leaking catheter, last seen on 9/6/2019 for a SPT change. Patient accompanied by aide and aide states that suprapubic catheter has not been draining in drainage bag and leaking around SPT. Denies pain, fever, or chills.\par \par SPT changed 22 fr straight tip. Patient tolerated procedure. Urine draining in drainage bag. Catheter irrigated with sterile water 150 cc. Urine clear yellow. \par \par

## 2019-09-20 NOTE — REVIEW OF SYSTEMS
[see HPI] : see HPI [Negative] : Endocrine [Incontinence] : no incontinence [Dysuria] : no dysuria [Nocturia] : no nocturia

## 2019-09-20 NOTE — ASSESSMENT
[FreeTextEntry1] : TISH SHAIKH is a 63 year old male with a past medical history of CP and urethral stricture . Presents to the office today for leaking catheter, last seen on 9/6/2019 for a SPT change. Patient accompanied by aide and aide states that suprapubic catheter has not been draining in drainage bag and leaking around SPT. Denies pain, fever, or chills.\par \par SPT changed 22 fr straight tip. Patient tolerated procedure. Urine draining in drainage bag. Catheter irrigated with sterile water 150 cc. Urine clear yellow. \par \par

## 2019-09-30 ENCOUNTER — EMERGENCY (EMERGENCY)
Facility: HOSPITAL | Age: 64
LOS: 0 days | Discharge: HOME | End: 2019-09-30
Attending: EMERGENCY MEDICINE | Admitting: EMERGENCY MEDICINE
Payer: MEDICARE

## 2019-09-30 VITALS
TEMPERATURE: 98 F | RESPIRATION RATE: 18 BRPM | OXYGEN SATURATION: 95 % | DIASTOLIC BLOOD PRESSURE: 73 MMHG | HEART RATE: 70 BPM | SYSTOLIC BLOOD PRESSURE: 109 MMHG

## 2019-09-30 VITALS
RESPIRATION RATE: 18 BRPM | TEMPERATURE: 98 F | HEART RATE: 80 BPM | SYSTOLIC BLOOD PRESSURE: 119 MMHG | OXYGEN SATURATION: 95 % | DIASTOLIC BLOOD PRESSURE: 72 MMHG

## 2019-09-30 DIAGNOSIS — Z98.890 OTHER SPECIFIED POSTPROCEDURAL STATES: Chronic | ICD-10-CM

## 2019-09-30 DIAGNOSIS — Z93.59 OTHER CYSTOSTOMY STATUS: Chronic | ICD-10-CM

## 2019-09-30 DIAGNOSIS — N39.0 URINARY TRACT INFECTION, SITE NOT SPECIFIED: ICD-10-CM

## 2019-09-30 DIAGNOSIS — Z98.890 OTHER SPECIFIED POSTPROCEDURAL STATES: ICD-10-CM

## 2019-09-30 DIAGNOSIS — Z93.1 GASTROSTOMY STATUS: Chronic | ICD-10-CM

## 2019-09-30 DIAGNOSIS — R30.0 DYSURIA: ICD-10-CM

## 2019-09-30 LAB
APPEARANCE UR: ABNORMAL
BACTERIA # UR AUTO: ABNORMAL
BILIRUB UR-MCNC: NEGATIVE — SIGNIFICANT CHANGE UP
COLOR SPEC: YELLOW — SIGNIFICANT CHANGE UP
DIFF PNL FLD: ABNORMAL
EPI CELLS # UR: 1 /HPF — SIGNIFICANT CHANGE UP (ref 0–5)
GLUCOSE UR QL: NEGATIVE — SIGNIFICANT CHANGE UP
HYALINE CASTS # UR AUTO: 2 /LPF — SIGNIFICANT CHANGE UP (ref 0–7)
KETONES UR-MCNC: NEGATIVE — SIGNIFICANT CHANGE UP
LEUKOCYTE ESTERASE UR-ACNC: ABNORMAL
NITRITE UR-MCNC: POSITIVE
PH UR: 8.5 — HIGH (ref 5–8)
PROT UR-MCNC: ABNORMAL
RBC CASTS # UR COMP ASSIST: 29 /HPF — HIGH (ref 0–4)
SP GR SPEC: 1.01 — SIGNIFICANT CHANGE UP (ref 1.01–1.02)
UROBILINOGEN FLD QL: SIGNIFICANT CHANGE UP
WBC UR QL: 4 /HPF — SIGNIFICANT CHANGE UP (ref 0–5)

## 2019-09-30 PROCEDURE — 99283 EMERGENCY DEPT VISIT LOW MDM: CPT | Mod: GC

## 2019-09-30 RX ORDER — KETOROLAC TROMETHAMINE 30 MG/ML
15 SYRINGE (ML) INJECTION ONCE
Refills: 0 | Status: DISCONTINUED | OUTPATIENT
Start: 2019-09-30 | End: 2019-09-30

## 2019-09-30 RX ORDER — CEFPODOXIME PROXETIL 100 MG
1 TABLET ORAL
Qty: 20 | Refills: 0
Start: 2019-09-30 | End: 2019-10-09

## 2019-09-30 RX ADMIN — Medication 15 MILLIGRAM(S): at 14:40

## 2019-09-30 NOTE — ED ADULT NURSE NOTE - NSIMPLEMENTINTERV_GEN_ALL_ED
Implemented All Fall with Harm Risk Interventions:  [place] to call system. Call bell, personal items and telephone within reach. Instruct patient to call for assistance. Room bathroom lighting operational. Non-slip footwear when patient is off stretcher. Physically safe environment: no spills, clutter or unnecessary equipment. Stretcher in lowest position, wheels locked, appropriate side rails in place. Provide visual cue, wrist band, yellow gown, etc. Monitor gait and stability. Monitor for mental status changes and reorient to person, place, and time. Review medications for side effects contributing to fall risk. Reinforce activity limits and safety measures with patient and family. Provide visual clues: red socks.

## 2019-09-30 NOTE — ED PROVIDER NOTE - PATIENT PORTAL LINK FT
You can access the FollowMyHealth Patient Portal offered by [facility] by registering at the following website: [email or web address]. By joining [employer]’s FollowMyHealth portal, you will also be able to view your health information using other applications (apps) compatible with our system.

## 2019-09-30 NOTE — ED PROVIDER NOTE - ATTENDING CONTRIBUTION TO CARE
63 y/o male with hx of CP/Gtube/Suprapubic presents to ED with dysuria and pain at catheter site, associated with passing of urine.  No abdominal pain.  No fever or chills.  PE:  agree with above.  A/P:  R/O UTI/Cellulitis

## 2019-09-30 NOTE — ED PROVIDER NOTE - OBJECTIVE STATEMENT
63 yo male, pmh of cp, gtube, indwelling alatorre, presents to ed for evaluation of dysuira. Pt states started yesterday, pain at site of alatorre, has had before, treated with abx, described as burning, no radiation. denies fever, chills, cp, sob, abd pain, nvd, rash, hematuria, back pain.

## 2019-09-30 NOTE — ED ADULT NURSE NOTE - OBJECTIVE STATEMENT
Pt has hx of CP, a&ox3, from adult group [place], aide at bedside. Pt c/o pain at suprapubic catheter site. Denies fever/chills, n/v/d, CP/SOB, back pain.

## 2019-09-30 NOTE — ED ADULT TRIAGE NOTE - CHIEF COMPLAINT QUOTE
alatorre in place, painful urination that started yesterday. alert and in no distress. with group home aide.

## 2019-09-30 NOTE — ED PROVIDER NOTE - NS ED ROS FT
Constitutional: (-) fever, (-) chills,  Eyes: (-) visual changes  ENT: (-) nasal congestions  Cardiovascular: (-) chest pain, (-) syncope  Respiratory: (-) cough, (-) shortness of breath, (-) dyspnea,   Gastrointestinal: (-) vomiting, (-) diarrhea, (-)nausea  Musculoskeletal: (-) neck pain, (-) back pain, (-) joint pain,  Integumentary: (-) rash  Neurological: (-) headache, , (-) dizziness, (-) tingling, (-)numbness,   : , (+)dysuria, (-) hematuria  Allergic/Immunologic: (-) pruritus

## 2019-09-30 NOTE — ED PROVIDER NOTE - PHYSICAL EXAMINATION
Physical Exam    Vital Signs: I have reviewed the initial vital signs.  Constitutional: well-nourished, appears stated age, no acute distress  Eyes: Conjunctiva pink, Sclera clear  Cardiovascular: S1 and S2, regular rate, regular rhythm, well-perfused extremities, radial pulses equal and 2+  Respiratory: unlabored respiratory effort, clear to auscultation bilaterally no wheezing, rales and rhonchi  Gastrointestinal: soft, non-tender abdomen, no pulsatile mass, normal bowl sounds, g tube in place  : chaperoned with DG Anaya  Musculoskeletal: contracted upper extremities   Integumentary: warm, dry, no rash  Neurologic: awake, alert, nvi, extremities’ motor and sensory functions grossly intact

## 2019-10-03 RX ORDER — CEFUROXIME AXETIL 250 MG
1 TABLET ORAL
Qty: 20 | Refills: 0
Start: 2019-10-03 | End: 2019-10-12

## 2019-10-03 NOTE — ED POST DISCHARGE NOTE - DETAILS
SPOKE TO DANIA TIAN AT GROUP HOME. CEFTIN SENT TO PHARMACY. D/C VANTIN. F/U WITH PMD WITHIN 24 HRS WAS ADVISED. PATIENT DOING WELL PER RN

## 2019-10-07 ENCOUNTER — EMERGENCY (EMERGENCY)
Facility: HOSPITAL | Age: 64
LOS: 0 days | Discharge: HOME | End: 2019-10-07
Attending: EMERGENCY MEDICINE | Admitting: EMERGENCY MEDICINE
Payer: MEDICARE

## 2019-10-07 VITALS
OXYGEN SATURATION: 93 % | HEART RATE: 81 BPM | SYSTOLIC BLOOD PRESSURE: 120 MMHG | TEMPERATURE: 97 F | DIASTOLIC BLOOD PRESSURE: 63 MMHG

## 2019-10-07 DIAGNOSIS — Z98.890 OTHER SPECIFIED POSTPROCEDURAL STATES: ICD-10-CM

## 2019-10-07 DIAGNOSIS — Z93.59 OTHER CYSTOSTOMY STATUS: Chronic | ICD-10-CM
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
## 2019-10-07 DIAGNOSIS — T83.090A OTHER MECHANICAL COMPLICATION OF CYSTOSTOMY CATHETER, INITIAL ENCOUNTER: ICD-10-CM

## 2019-10-07 DIAGNOSIS — Z93.1 GASTROSTOMY STATUS: Chronic | ICD-10-CM
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
## 2019-10-07 DIAGNOSIS — Z98.890 OTHER SPECIFIED POSTPROCEDURAL STATES: Chronic | ICD-10-CM

## 2019-10-07 PROCEDURE — 99282 EMERGENCY DEPT VISIT SF MDM: CPT | Mod: 25,GC

## 2019-10-07 PROCEDURE — 51705 CHANGE OF BLADDER TUBE: CPT | Mod: GC

## 2019-10-07 NOTE — ED PROVIDER NOTE - CLINICAL SUMMARY MEDICAL DECISION MAKING FREE TEXT BOX
64 male here for SP catheter change, had device changed in ED successfully, will discharge with return and follow up instructions.

## 2019-10-07 NOTE — ED PROVIDER NOTE - PHYSICAL EXAMINATION
CONSTITUTIONAL: no acute distress.   SKIN: warm, dry  HEAD: Normocephalic; atraumatic.  EYES: PERRL, EOMI, normal sclera and conjunctiva   ENT: No nasal discharge; airway clear.  NECK: Supple; non tender.  CARD:  Regular rate and rhythm.   RESP: NO inc WOB   ABD: soft ntnd  NEURO: Alert, oriented, grossly unremarkable  PSYCH: Cooperative, appropriate.

## 2019-10-07 NOTE — ED PROVIDER NOTE - ATTENDING CONTRIBUTION TO CARE
I personally evaluated the patient. I reviewed the Resident’s or Physician Assistant’s note (as assigned above), and agree with the findings and plan except as documented in my note.     64 male here for eval of suprapubic catheter malfunction. Brought to ED by counsellor. Is bedridden from a group home with ADLs by assistance, has PEG as well but makes needs known.     Prior chart review reveals multiple complaints and visits for similar,  handles his SP.     ROS limited, denies urinary complaints.     PE: male in no distress. ABD: non distended no rebound no guarding. SKIN: normal     Impression: catheter malfunction     Plan: replace catheter

## 2019-10-07 NOTE — ED PROVIDER NOTE - NS ED ROS FT
CONSTITUTIONAL - No acute distress , No weight change  SKIN - No rash  HEMATOLOGIC - No abnormal bleeding or bruising  EYES - , No conjunctival injection, No drainage   ENT -, No sore throat, No neck pain, No rhinorrhea, No ear pain  RESPIRATORY - No shortness of breath, No cough  CARDIAC -No chest pain, No palpitations  GI - No abdominal pain, No nausea, No vomiting, No diarrhea, No constipation, No bright red blood per rectum or melena. No flank pain  - No dysuria, frequency, hematuria.   ENDO - No polydipsia, No polyuria, No heat/cold intolerance  MUSCULOSKELETAL - No joint paint, No swelling, No back pain  NEUROLOGIC - No numbness, No focal weakness, No headache, No dizziness  ALLERGIC- no pruritis

## 2019-10-07 NOTE — ED PROVIDER NOTE - NSFOLLOWUPINSTRUCTIONS_ED_ALL_ED_FT
Indwelling Urinary Catheter Care, Adult  An indwelling urinary catheter is a thin tube that is put into your bladder. The tube helps to drain pee (urine) out of your body. The tube goes in through your urethra. Your urethra is where pee comes out of your body. Your pee will come out through the catheter, then it will go into a bag (drainage bag).    Take good care of your catheter so it will work well.    How to wear your catheter and bag  Supplies needed     Sticky tape (adhesive tape) or a leg strap.  Alcohol wipe or soap and water (if you use tape).  A clean towel (if you use tape).  Large overnight bag.  Smaller bag (leg bag).  Wearing your catheter     Attach your catheter to your leg with tape or a leg strap.  Make sure the catheter is not pulled tight.  If a leg strap gets wet, take it off and put on a dry strap.  If you use tape to hold the bag on your leg:  Use an alcohol wipe or soap and water to wash your skin where the tape made it sticky before.  Use a clean towel to pat-dry that skin.  Use new tape to make the bag stay on your leg.  Wearing your bags    You should have been given a large overnight bag.  You may wear the overnight bag in the day or night.  Always have the overnight bag lower than your bladder. Do not let the bag touch the floor.  Before you go to sleep, put a clean plastic bag in a wastebasket. Then hang the overnight bag inside the wastebasket.  You should also have a smaller leg bag that fits under your clothes.  Always wear the leg bag below your knee.  Do not wear your leg bag at night.  How to care for your skin and catheter  Supplies needed     A clean washcloth.  Water and mild soap.  A clean towel.  Caring for your skin and catheter     Image Image   Clean the skin around your catheter every day:  Wash your hands with soap and water.  Wet a clean washcloth in warm water and mild soap.  Clean the skin around your urethra.  If you are female:  Gently spread the folds of skin around your vagina (labia).  With the washcloth in your other hand, wipe the inner side of your labia on each side. Wipe from front to back.  If you are male:  Pull back any skin that covers the end of your penis (foreskin).  With the washcloth in your other hand, wipe your penis in small circles. Start wiping at the tip of your penis, then move away from the catheter.  With your free hand, hold the catheter close to where it goes into your body.  Keep holding the catheter during cleaning so it does not get pulled out.  With the washcloth in your other hand, clean the catheter.  Only wipe downward on the catheter.  Do not wipe upward toward your body. Doing this may push germs into your urethra and cause infection.  Use a clean towel to pat-dry the catheter and the skin around it. Make sure to wipe off all soap.  Wash your hands with soap and water.  Shower every day. Do not take baths.  Do not use cream, ointment, or lotion on the area where the catheter goes into your body, unless your doctor tells you to.  Do not use powders, sprays, or lotions on your genital area.  Check your skin around the catheter every day for signs of infection. Check for:  Redness, swelling, or pain.  Fluid or blood.  Warmth.  Pus or a bad smell.  How to empty the bag  Supplies needed     Rubbing alcohol.  Gauze pad or cotton ball.  Tape or a leg strap.  Emptying the bag     Pour the pee out of your bag when it is ?–½ full, or at least 2–3 times a day. Do this for your overnight bag and your leg bag. Do not clean your bags unless your doctor tells you to.  Wash your hands with soap and water.    Separate (detach) the bag from your leg.    Hold the bag over the toilet or a clean pail. Keep the bag lower than your hips and bladder. This is so the pee (urine) does not go back into the tube.    Open the pour spout. It is at the bottom of the bag.    Empty the pee into the toilet or pail. Do not let the pour spout touch any surface.    Put rubbing alcohol on a gauze pad or cotton ball.    Use the gauze pad or cotton ball to clean the pour spout.    Close the pour spout.    Attach the bag to your leg with tape or a leg strap.    Wash your hands with soap and water.    How to change the bag  Supplies needed     Alcohol wipes.  A clean bag.  Tape or a leg strap.  Changing the bag     Replace your bag with a clean bag once a month. If it starts to leak, smell bad, or look dirty, change it sooner.  Wash your hands with soap and water.    Separate the dirty bag from your leg.    Pinch the catheter with your fingers so that pee does not spill out.    Separate the catheter tube from the bag tube where these tubes connect (at the connection valve). Do not let the tubes touch any surface.    Clean the end of the catheter tube with an alcohol wipe. Use a different alcohol wipe to clean the end of the bag tube.    Connect the catheter tube to the tube of the clean bag.    Attach the clean bag to your leg with tape or a leg strap. Do not make the bag tight on your leg.    Wash your hands with soap and water.    General rules  Image   Never pull on your catheter. Never try to take it out. Doing that can hurt you.  Always wash your hands before and after you touch your catheter or bag. Use a mild, fragrance-free soap. If you do not have soap and water, use hand .  Always make sure there are no twists or bends (kinks) in the catheter tube.  Always make sure there are no leaks in the catheter or bag.  Drink enough fluid to keep your pee pale yellow.  Do not take baths, swim, or use a hot tub.  If you are female, wipe from front to back after you poop (have a bowel movement).  Contact a doctor if:  Your pee is cloudy.  Your pee smells worse than usual.  Your catheter gets clogged.  Your catheter leaks.  Your bladder feels full.  Get help right away if:  You have redness, swelling, or pain where the catheter goes into your body.  You have fluid, blood, pus, or a bad smell coming from the area where the catheter goes into your body.  Your skin feels warm where the catheter goes into your body.  You have a fever.  You have pain in your:  Belly (abdomen).  Legs.  Lower back.  Bladder.  You see blood in the catheter.  Your pee is pink or red.  You feel sick to your stomach (nauseous).  You throw up (vomit).  You have chills.  Your pee is not draining into the bag.  Your catheter gets pulled out.  Summary  An indwelling urinary catheter is a thin tube that is placed into the bladder to help drain pee (urine) out of the body.   The catheter is placed into the part of the body that drains pee from the bladder (urethra).  Taking good care of your catheter will keep it working properly and help prevent problems.  Always wash your hands before and after touching your catheter or bag.  Never pull on your catheter or try to take it out.  This information is not intended to replace advice given to you by your health care provider. Make sure you discuss any questions you have with your health care provider.

## 2019-10-07 NOTE — ED PROVIDER NOTE - PATIENT PORTAL LINK FT
You can access the FollowMyHealth Patient Portal offered by [facility] by registering at the following website: [email or web address]. By joining [employer]’s FollowMyHealth portal, you will also be able to view your health information using other applications (apps) compatible with our system.

## 2019-10-07 NOTE — ED PROVIDER NOTE - OBJECTIVE STATEMENT
Pt is a 63 yo M presenting with leaking from the stoma of his 22F Suprapubic Catheter . Denying any dysuria, hematuria Pt is a 63 yo M with hx of Cerebral Palsy presenting with leaking from the stoma of his 22F Suprapubic Catheter . Denying any dysuria or hematuria. Denies any fevers, chills, n/v , CP .   Denies any leaking from the catheter itself .

## 2019-10-29 ENCOUNTER — EMERGENCY (EMERGENCY)
Facility: HOSPITAL | Age: 64
LOS: 0 days | Discharge: HOME | End: 2019-10-29
Attending: EMERGENCY MEDICINE | Admitting: EMERGENCY MEDICINE
Payer: MEDICARE

## 2019-10-29 VITALS
OXYGEN SATURATION: 96 % | RESPIRATION RATE: 16 BRPM | DIASTOLIC BLOOD PRESSURE: 53 MMHG | TEMPERATURE: 99 F | HEART RATE: 75 BPM | SYSTOLIC BLOOD PRESSURE: 95 MMHG

## 2019-10-29 DIAGNOSIS — K59.00 CONSTIPATION, UNSPECIFIED: ICD-10-CM

## 2019-10-29 DIAGNOSIS — Z98.890 OTHER SPECIFIED POSTPROCEDURAL STATES: ICD-10-CM
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
## 2019-10-29 DIAGNOSIS — T83.030A LEAKAGE OF CYSTOSTOMY CATHETER, INITIAL ENCOUNTER: ICD-10-CM

## 2019-10-29 DIAGNOSIS — Z93.1 GASTROSTOMY STATUS: Chronic | ICD-10-CM
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
## 2019-10-29 DIAGNOSIS — Z93.59 OTHER CYSTOSTOMY STATUS: Chronic | ICD-10-CM

## 2019-10-29 DIAGNOSIS — Z98.890 OTHER SPECIFIED POSTPROCEDURAL STATES: Chronic | ICD-10-CM
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
## 2019-10-29 LAB
ALBUMIN SERPL ELPH-MCNC: 3.9 G/DL — SIGNIFICANT CHANGE UP (ref 3.5–5.2)
ALP SERPL-CCNC: 95 U/L — SIGNIFICANT CHANGE UP (ref 30–115)
ALT FLD-CCNC: 14 U/L — SIGNIFICANT CHANGE UP (ref 0–41)
ANION GAP SERPL CALC-SCNC: 12 MMOL/L — SIGNIFICANT CHANGE UP (ref 7–14)
APPEARANCE UR: ABNORMAL
AST SERPL-CCNC: 20 U/L — SIGNIFICANT CHANGE UP (ref 0–41)
BACTERIA # UR AUTO: ABNORMAL
BASOPHILS # BLD AUTO: 0.02 K/UL — SIGNIFICANT CHANGE UP (ref 0–0.2)
BASOPHILS NFR BLD AUTO: 0.5 % — SIGNIFICANT CHANGE UP (ref 0–1)
BILIRUB DIRECT SERPL-MCNC: <0.2 MG/DL — SIGNIFICANT CHANGE UP (ref 0–0.2)
BILIRUB INDIRECT FLD-MCNC: >0 MG/DL — LOW (ref 0.2–1.2)
BILIRUB SERPL-MCNC: 0.2 MG/DL — SIGNIFICANT CHANGE UP (ref 0.2–1.2)
BILIRUB UR-MCNC: NEGATIVE — SIGNIFICANT CHANGE UP
BLD GP AB SCN SERPL QL: SIGNIFICANT CHANGE UP
BUN SERPL-MCNC: 20 MG/DL — SIGNIFICANT CHANGE UP (ref 10–20)
CALCIUM SERPL-MCNC: 9.2 MG/DL — SIGNIFICANT CHANGE UP (ref 8.5–10.1)
CHLORIDE SERPL-SCNC: 102 MMOL/L — SIGNIFICANT CHANGE UP (ref 98–110)
CO2 SERPL-SCNC: 27 MMOL/L — SIGNIFICANT CHANGE UP (ref 17–32)
COLOR SPEC: ABNORMAL
CREAT SERPL-MCNC: 0.5 MG/DL — LOW (ref 0.7–1.5)
DIFF PNL FLD: ABNORMAL
EOSINOPHIL # BLD AUTO: 0.25 K/UL — SIGNIFICANT CHANGE UP (ref 0–0.7)
EOSINOPHIL NFR BLD AUTO: 5.8 % — SIGNIFICANT CHANGE UP (ref 0–8)
EPI CELLS # UR: 5 /HPF — SIGNIFICANT CHANGE UP (ref 0–5)
GLUCOSE SERPL-MCNC: 98 MG/DL — SIGNIFICANT CHANGE UP (ref 70–99)
GLUCOSE UR QL: NEGATIVE — SIGNIFICANT CHANGE UP
HCT VFR BLD CALC: 40.1 % — LOW (ref 42–52)
HGB BLD-MCNC: 13.8 G/DL — LOW (ref 14–18)
HYALINE CASTS # UR AUTO: 6 /LPF — SIGNIFICANT CHANGE UP (ref 0–7)
IMM GRANULOCYTES NFR BLD AUTO: 0 % — LOW (ref 0.1–0.3)
KETONES UR-MCNC: NEGATIVE — SIGNIFICANT CHANGE UP
LACTATE SERPL-SCNC: 0.8 MMOL/L — SIGNIFICANT CHANGE UP (ref 0.5–2.2)
LEUKOCYTE ESTERASE UR-ACNC: NEGATIVE — SIGNIFICANT CHANGE UP
LYMPHOCYTES # BLD AUTO: 1.39 K/UL — SIGNIFICANT CHANGE UP (ref 1.2–3.4)
LYMPHOCYTES # BLD AUTO: 32.5 % — SIGNIFICANT CHANGE UP (ref 20.5–51.1)
MCHC RBC-ENTMCNC: 31.9 PG — HIGH (ref 27–31)
MCHC RBC-ENTMCNC: 34.4 G/DL — SIGNIFICANT CHANGE UP (ref 32–37)
MCV RBC AUTO: 92.6 FL — SIGNIFICANT CHANGE UP (ref 80–94)
MONOCYTES # BLD AUTO: 0.44 K/UL — SIGNIFICANT CHANGE UP (ref 0.1–0.6)
MONOCYTES NFR BLD AUTO: 10.3 % — HIGH (ref 1.7–9.3)
NEUTROPHILS # BLD AUTO: 2.18 K/UL — SIGNIFICANT CHANGE UP (ref 1.4–6.5)
NEUTROPHILS NFR BLD AUTO: 50.9 % — SIGNIFICANT CHANGE UP (ref 42.2–75.2)
NITRITE UR-MCNC: POSITIVE
NRBC # BLD: 0 /100 WBCS — SIGNIFICANT CHANGE UP (ref 0–0)
PH UR: 8.5 — HIGH (ref 5–8)
PLATELET # BLD AUTO: 218 K/UL — SIGNIFICANT CHANGE UP (ref 130–400)
POTASSIUM SERPL-MCNC: 4.3 MMOL/L — SIGNIFICANT CHANGE UP (ref 3.5–5)
POTASSIUM SERPL-SCNC: 4.3 MMOL/L — SIGNIFICANT CHANGE UP (ref 3.5–5)
PROT SERPL-MCNC: 7 G/DL — SIGNIFICANT CHANGE UP (ref 6–8)
PROT UR-MCNC: ABNORMAL
RBC # BLD: 4.33 M/UL — LOW (ref 4.7–6.1)
RBC # FLD: 13 % — SIGNIFICANT CHANGE UP (ref 11.5–14.5)
RBC CASTS # UR COMP ASSIST: 412 /HPF — HIGH (ref 0–4)
SODIUM SERPL-SCNC: 141 MMOL/L — SIGNIFICANT CHANGE UP (ref 135–146)
SP GR SPEC: 1.01 — SIGNIFICANT CHANGE UP (ref 1.01–1.02)
TRI-PHOS CRY UR QL COMP ASSIST: ABNORMAL
UROBILINOGEN FLD QL: SIGNIFICANT CHANGE UP
WBC # BLD: 4.28 K/UL — LOW (ref 4.8–10.8)
WBC # FLD AUTO: 4.28 K/UL — LOW (ref 4.8–10.8)
WBC UR QL: 5 /HPF — SIGNIFICANT CHANGE UP (ref 0–5)

## 2019-10-29 PROCEDURE — 51705 CHANGE OF BLADDER TUBE: CPT | Mod: GC

## 2019-10-29 PROCEDURE — 74177 CT ABD & PELVIS W/CONTRAST: CPT | Mod: 26

## 2019-10-29 PROCEDURE — 74022 RADEX COMPL AQT ABD SERIES: CPT | Mod: 26

## 2019-10-29 PROCEDURE — 99284 EMERGENCY DEPT VISIT MOD MDM: CPT | Mod: 25,GC

## 2019-10-29 NOTE — ED ADULT NURSE NOTE - CHIEF COMPLAINT
Norris Coronel 
 
 
 [address] [id number] 
[phone] Patient: Karlene Mackay MRN: [id number] SERGIO:[id number] Visit Information Date & Time Provider Department Dept. Phone Encounter #  
 4/3/2018 10:10 AM NURSE [address] Sports Medicine and [address] [place] [address] [phone] [id number] Your Appointments 4/10/2018 11:20 AM  
ESTABLISHED PATIENT with Huma Mike MD  
[facility] ([address]) Appt Note: Patient request 1 yr f/u  
 [address] [facility] [address] [place]  
[address] [facility] [address]  
  
    
 4/23/2018 10:30 AM  
Nurse Visit with Celeste [address] [place] [address] and Primary Care ([facility]) Appt Note: pt/inr  
 [address] [facility]  
  
   
 [id number] Upcoming Health Maintenance Date Due  
 GLAUCOMA SCREENING Q2Y 4/21/2017 MEDICARE YEARLY EXAM 9/2/2018 DTaP/Tdap/Td series (2 - Td) 1/17/2027 Allergies as of 4/3/2018  Review Complete On: 2/28/2018 By: Isatu Guevara MD  
 No Known Allergies Current Immunizations  Reviewed on 5/4/2012 Name Date ZZZ-RETIRED (DO NOT USE) Pneumococcal Vaccine (Unspecified Type) 5/7/2012  8:12 AM  
  
 Not reviewed this visit You Were Diagnosed With   
  
 Codes Comments Encounter for monitoring coumadin therapy    -  Primary ICD-10-CM: Z51.81, Z79.01 
ICD-9-CM: V58.83, V58.61 Chronically on opiate therapy     ICD-10-CM: Z79.891 ICD-9-CM: V58.69 Atrial fibrillation, unspecified type ([facility]     ICD-10-CM: I48.91 
ICD-9-CM: 427.31 Vitals OB Status Smoking Status Hysterectomy Former Smoker Preferred Pharmacy Pharmacy Name Phone Diandra Jacob [id number] [facility] [phone] Your Updated Medication List  
  
   
This list is accurate as of 4/3/18 11:34 AM.  Always use your most recent med list.  
  
  
  
  
 aspirin 81 mg chewable tablet Take 81 mg by mouth daily. CARTIA  mg ER capsule Generic drug:  dilTIAZem CD  
TAKE ONE CAPSULE BY MOUTH DAILY  
  
 furosemide 20 mg tablet Commonly known as:  LASIX HYDROcodone-acetaminophen 5-325 mg per tablet Commonly known as:  Heike Martin Take 1 Tab by mouth every six (6) hours as needed for Pain. Max Daily Amount: 4 Tabs. losartan 50 mg tablet Commonly known as:  COZAAR Take 1 Tab by mouth daily. metoprolol tartrate 25 mg tablet Commonly known as:  LOPRESSOR  
TAKE 1/2 TABLET BY MOUTH TWICE DAILY pravastatin 20 mg tablet Commonly known as:  PRAVACHOL Take 1 Tab by mouth nightly. warfarin 2 mg tablet Commonly known as:  COUMADIN  
TAKE ONE TABLET BY MOUTH DAILY Prescriptions Printed Refills HYDROcodone-acetaminophen (NORCO) 5-325 mg per tablet 0 Sig: Take 1 Tab by mouth every six (6) hours as needed for Pain. Max Daily Amount: 4 Tabs. Class: Print Route: Oral  
  
We Performed the Following AMB POC PT/INR [98020 CPT(R)] REFERRAL TO CARDIOLOGY [XLO05 Custom] Referral Information Referral ID Referred By Referred To  
  
 [id number] Elvi montesinos, [address] [place] [address],[address], MD Hayes [address] [place] [address] Phone: [phone] Fax: [phone] Visits Status Start Date End Date 1 New Request 4/3/18 4/3/19 If your referral has a status of pending review or denied, additional information will be sent to support the outcome of this decision. Introducing [facility]! Maris Medina introduces "[employer] patient portal. Now you can access parts of your medical record, email your doctor's office, and request medication refills online.    
 
1. In your internet browser, go to [email or web address] 2. Click on the First Time User? Click Here link in the Sign In box. You will see the New Member Sign Up page. 3. Enter your [employer] Access Code exactly as it appears below. You will not need to use this code after youve completed the sign-up process. If you do not sign up before the expiration date, you must request a new code. · [employer] Access Code: [email or web address] Expires: 7/2/2018 11:34 AM 
 
4. Enter the last four digits of your Social Security Number (xxxx) and Date of Birth (mm/dd/yyyy) as indicated and click Submit. You will be taken to the next sign-up page. 5. Create a [employer] ID. This will be your [employer] login ID and cannot be changed, so think of one that is secure and easy to remember. 6. Create a [employer] password. You can change your password at any time. 7. Enter your Password Reset Question and Answer. This can be used at a later time if you forget your password. 8. Enter your e-mail address. You will receive e-mail notification when new information is available in [address] 9. Click Sign Up. You can now view and download portions of your medical record. 10. Click the Download Summary menu link to download a portable copy of your medical information. If you have questions, please visit the Frequently Asked Questions section of the [employer] website. Remember, [employer] is NOT to be used for urgent needs. For medical emergencies, dial 911. Now available from your iPhone and Android! Please provide this summary of care documentation to your next provider. Your primary care clinician is listed as Alexsander Herron. If you have any questions after today's visit, please call [phone]. The patient is a 64y Male complaining of urinary catheter complications.

## 2019-10-29 NOTE — ED PROVIDER NOTE - ATTENDING CONTRIBUTION TO CARE
65yo man h/o CP, spastic quadroplegia s/p PEG tube, bladder neck stricture s/p suprapubic catheter BIB aide from group home due to leaking from around the catheter x 2 days. No fever or chills, but aide notes constipation and pt's abd is distended. On exam pt is alert and not distressed able to give some simple history. Denies jesica pain, but feels discomfort with palpation, no peritoneal signs. Lungs CTA, CVS1S2, nontoxic appearing. Will check labs and CT, replace catheter, reassess.

## 2019-10-29 NOTE — ED PROVIDER NOTE - OBJECTIVE STATEMENT
63 yo M with PMHx of asthma, BPH, cerebral palsy, osteoporosis, seizure, spastic quadriplegia s/p PEG tube, bladder neck stricture s/p suprapubic cath, R renal stone s/p percutaneous lithotripsy who presents with urinary catheter complications. Per aide at bedside, pt has been leaking from the suprapubic cath site onto his diaper x 2 days. Edouard bag is still filling but diapers have been soaked. Pt has been constipated for past couple of days but has not had fever, vomiting, abd pain. 63 yo M with PMHx of asthma, BPH, cerebral palsy, osteoporosis, seizure, spastic quadriplegia s/p PEG tube, bladder neck stricture s/p suprapubic cath, R renal stone s/p percutaneous lithotripsy who presents with urinary catheter complications. Per aide at bedside, pt has been leaking from the suprapubic cath ostomy site x 2 days. Edouard bag is still filling but diapers have been soaked. Pt has been constipated for past couple of days but has not had fever, vomiting, abd pain. Per chart review, pt had 22Fr cath replaced by 24Fr on 10/7/19.

## 2019-10-29 NOTE — ED PROVIDER NOTE - CLINICAL SUMMARY MEDICAL DECISION MAKING FREE TEXT BOX
Labs ok, CT with moderate rectal load but no obstruction. UA with nitrites but likely colonized. Edouard changed. Spoke with aide re need for enema; he reports that they routinely do it at the group home, would prefer discharge so they can do it according to their protocol. Pt also comfortable with this plan. Pt d/c.

## 2019-10-29 NOTE — ED PROCEDURE NOTE - CPROC ED INFORMED CONSENT1
Skin normal color for race, warm, dry and intact. No evidence of rash.
Benefits, risks, and possible complications of procedure explained to patient/caregiver who verbalized understanding and gave verbal consent.

## 2019-10-29 NOTE — ED PROVIDER NOTE - PROGRESS NOTE DETAILS
TC: TC: 63 yo M with PMHx of asthma, BPH, cerebral palsy, osteoporosis, seizure, spastic quadriplegia s/p PEG tube, bladder neck stricture s/p suprapubic cath, R renal stone s/p percutaneous lithotripsy who presents with leaking from suprapubic catheter. Abd distended, pt with some abd discomfort. Ordered labs, CT abd. Alatorre replaced with 24Fr alatorre with return of urine. Pending labs and CT. TC: 65 yo M with PMHx of asthma, BPH, cerebral palsy, osteoporosis, seizure, spastic quadriplegia s/p PEG tube, bladder neck stricture s/p suprapubic cath, R renal stone s/p percutaneous lithotripsy who presents with leaking from suprapubic catheter. Abd distended, pt with some abd discomfort. Bedside US shows collapsed bladder with alatorre in place. Ordered labs, CT abd. Alatorre replaced with 24Fr alatorre with return of urine. Pending labs and CT.

## 2019-10-29 NOTE — ED PROVIDER NOTE - PATIENT PORTAL LINK FT
You can access the FollowMyHealth Patient Portal offered by [facility] by registering at the following website: [email or web address]. By joining [employer]’s FollowMyHealth portal, you will also be able to view your health information using other applications (apps) compatible with our system.

## 2019-10-29 NOTE — ED PROVIDER NOTE - PHYSICAL EXAMINATION
CONSTITUTIONAL: well developed, nontoxic appearing, in no acute distress, speaking in full sentences  SKIN: warm, dry, no rash, cap refill < 2 seconds  HEENT: normocephalic, atraumatic, no conjunctival erythema, moist mucous membranes, patent airway  NECK: supple, no masses  CV:  regular rate, regular rhythm, 2+ radial pulses bilaterally  RESP: no wheezes, no rales, no rhonchi, normal work of breathing  ABD: soft, nontender, moderately distended, no rebound, no guarding, suprapubic catheter site clean/dry/intact  MSK: normal ROM, no cyanosis, no edema  NEURO: alert, grossly unremarkable  PSYCH: cooperative, appropriate CONSTITUTIONAL: well developed, nontoxic appearing, in no acute distress  SKIN: warm, dry, no rash, cap refill < 2 seconds  HEENT: normocephalic, atraumatic, no conjunctival erythema, moist mucous membranes, patent airway  NECK: supple, no masses  CV:  regular rate, regular rhythm  RESP: normal work of breathing  ABD: soft, nontender, moderately distended, no rebound, no guarding, suprapubic catheter site clean/dry/intact  MSK: normal ROM, no cyanosis, no edema  NEURO: alert, grossly unremarkable  PSYCH: cooperative, appropriate

## 2019-10-29 NOTE — ED PROVIDER NOTE - NS ED ROS FT
GEN:  no fever, no chills  NEURO:  no headache, no dizziness  ENT: no sore throat, no runny nose  CV:  no chest pain, no SOB  RESP:  no dyspnea, no cough  GI:  no vomiting, no abdominal pain, no diarrhea, + constipation  :  no hematuria  MSK:  no joint pain, no edema  SKIN:  no rash, no bruising  HEME: no hematochezia, no melena

## 2019-10-29 NOTE — ED PROVIDER NOTE - NSFOLLOWUPINSTRUCTIONS_ED_ALL_ED_FT
Please follow up with your primary care physician within 24-72 hours and return immediately if symptoms worsen.    Constipation, Adult  Constipation is when a person:  Poops (has a bowel movement) fewer times in a week than normal.  Has a hard time pooping.  Has poop that is dry, hard, or bigger than normal.  Follow these instructions at home:  Eating and drinking     Image   Eat foods that have a lot of fiber, such as:  Fresh fruits and vegetables.  Whole grains.  Beans.  Eat less of foods that are high in fat, low in fiber, or overly processed, such as:  French fries.  Hamburgers.  Cookies.  Candy.  Soda.  Drink enough fluid to keep your pee (urine) clear or pale yellow.  General instructions     Exercise regularly or as told by your doctor.  Go to the restroom when you feel like you need to poop. Do not hold it in.  Take over-the-counter and prescription medicines only as told by your doctor. These include any fiber supplements.  Do pelvic floor retraining exercises, such as:  Doing deep breathing while relaxing your lower belly (abdomen).  Relaxing your pelvic floor while pooping.  Watch your condition for any changes.  Keep all follow-up visits as told by your doctor. This is important.  Contact a doctor if:  You have pain that gets worse.  You have a fever.  You have not pooped for 4 days.  You throw up (vomit).  You are not hungry.  You lose weight.  You are bleeding from the anus.  You have thin, pencil-like poop (stool).  Get help right away if:  You have a fever, and your symptoms suddenly get worse.  You leak poop or have blood in your poop.  Your belly feels hard or bigger than normal (is bloated).  You have very bad belly pain.  You feel dizzy or you faint.  This information is not intended to replace advice given to you by your health care provider. Make sure you discuss any questions you have with your health care provider.        Edouard Catheter Care, Adult    A Edouard catheter is a soft, flexible tube that is placed into the bladder to drain urine. A Edouard catheter may be inserted if:    You leak urine or are not able to control when you urinate (urinary incontinence).  You are not able to urinate when you need to (urinary retention).   You had prostate surgery or surgery on the genitals.  You have certain medical conditions, such as multiple sclerosis, dementia, or a spinal cord injury.    If you are going home with a Edouard catheter in place, follow the instructions below.    TAKING CARE OF THE CATHETER   Wash your hands with soap and water.   Using mild soap and warm water on a clean washcloth:    Clean the area on your body closest to the catheter insertion site using a circular motion, moving away from the catheter. Never wipe toward the catheter because this could sweep bacteria up into the urethra and cause infection.   Remove all traces of soap. Pat the area dry with a clean towel. For males, reposition the foreskin.    Attach the catheter to your leg so there is no tension on the catheter. Use adhesive tape or a leg strap. If you are using adhesive tape, remove any sticky residue left behind by the previous tape you used.   Keep the drainage bag below the level of the bladder, but keep it off the floor.   Check throughout the day to be sure the catheter is working and urine is draining freely. Make sure the tubing does not become kinked.  Do not pull on the catheter or try to remove it. Pulling could damage internal tissues.    TAKING CARE OF THE DRAINAGE BAGS  You will be given two drainage bags to take home. One is a large overnight drainage bag, and the other is a smaller leg bag that fits underneath clothing. You may wear the overnight bag at any time, but you should never wear the smaller leg bag at night. Follow the instructions below for how to empty, change, and clean your drainage bags.    Emptying the Drainage Bag    You must empty your drainage bag when it is ?–½ full or at least 2–3 times a day.     Wash your hands with soap and water.   Keep the drainage bag below your hips, below the level of your bladder. This stops urine from going back into the tubing and into your bladder.    Hold the dirty bag over the toilet or a clean container.   Open the pour spout at the bottom of the bag and empty the urine into the toilet or container. Do not let the pour spout touch the toilet, container, or any other surface. Doing so can place bacteria on the bag, which can cause an infection.   Clean the pour spout with a gauze pad or cotton ball that has rubbing alcohol on it.   Close the pour spout.   Attach the bag to your leg with adhesive tape or a leg strap.   Wash your hands well.    Changing the Drainage Bag    Change your drainage bag once a month or sooner if it starts to smell bad or look dirty. Below are steps to follow when changing the drainage bag.     Wash your hands with soap and water.   Pinch off the rubber catheter so that urine does not spill out.   Disconnect the catheter tube from the drainage tube at the connection valve. Do not let the tubes touch any surface.    Clean the end of the catheter tube with an alcohol wipe. Use a different alcohol wipe to clean the end of the drainage tube.   Connect the catheter tube to the drainage tube of the clean drainage bag.   Attach the new bag to the leg with adhesive tape or a leg strap. Avoid attaching the new bag too tightly.    Wash your hands well.    Cleaning the Drainage Bag     Wash your hands with soap and water.   Wash the bag in warm, soapy water.   Rinse the bag thoroughly with warm water.   Fill the bag with a solution of white vinegar and water (1 cup vinegar to 1 qt warm water [.2 L vinegar to 1 L warm water]). Close the bag and soak it for 30 minutes in the solution.    Rinse the bag with warm water.    Hang the bag to dry with the pour spout open and hanging downward.   Store the clean bag (once it is dry) in a clean plastic bag.   Wash your hands well.     PREVENTING INFECTION  Wash your hands before and after handling your catheter.  Take showers daily and wash the area where the catheter enters your body. Do not take baths. Replace wet leg straps with dry ones, if this applies.  Do not use powders, sprays, or lotions on the genital area. Only use creams, lotions, or ointments as directed by your caregiver.  For females, wipe from front to back after each bowel movement.   Drink enough fluids to keep your urine clear or pale yellow unless you have a fluid restriction.   Do not let the drainage bag or tubing touch or lie on the floor.   Wear cotton underwear to absorb moisture and to keep your .    SEEK MEDICAL CARE IF:  Your urine is cloudy or smells unusually bad.  Your catheter becomes clogged.  You are not draining urine into the bag or your bladder feels full.  Your catheter starts to leak.    SEEK IMMEDIATE MEDICAL CARE IF:  You have pain, swelling, redness, or pus where the catheter enters the body.  You have pain in the abdomen, legs, lower back, or bladder.  You have a fever.  You see blood fill the catheter, or your urine is pink or red.  You have nausea, vomiting, or chills.  Your catheter gets pulled out.    MAKE SURE YOU:  Understand these instructions.  Will watch your condition.  Will get help right away if you are not doing well or get worse.    ADDITIONAL NOTES AND INSTRUCTIONS    Please follow up with your Primary MD in 24-48 hr.  Seek immediate medical care for any new/worsening signs or symptoms.

## 2019-10-29 NOTE — ED ADULT NURSE NOTE - NSIMPLEMENTINTERV_GEN_ALL_ED
Implemented All Fall with Harm Risk Interventions:  [place] to call system. Call bell, personal items and telephone within reach. Instruct patient to call for assistance. Room bathroom lighting operational. Non-slip footwear when patient is off stretcher. Physically safe environment: no spills, clutter or unnecessary equipment. Stretcher in lowest position, wheels locked, appropriate side rails in place. Provide visual cue, wrist band, yellow gown, etc. Monitor gait and stability. Monitor for mental status changes and reorient to person, place, and time. Review medications for side effects contributing to fall risk. Reinforce activity limits and safety measures with patient and family. Provide visual clues: red socks.

## 2019-11-01 RX ORDER — CEFUROXIME AXETIL 250 MG
10 TABLET ORAL
Qty: 140 | Refills: 0
Start: 2019-11-01 | End: 2019-11-07

## 2019-11-01 NOTE — ED POST DISCHARGE NOTE - DETAILS
SPOKE WITH RN, DAPHNE AT PATIENT FACILITY. SHE WILL ADMINISTER MEDS TO PATIENT AND ARRANGE MD F/U ASAP AT FACILITY

## 2019-11-04 ENCOUNTER — APPOINTMENT (OUTPATIENT)
Dept: UROLOGY | Facility: CLINIC | Age: 64
End: 2019-11-04
Payer: MEDICARE

## 2019-11-04 PROCEDURE — 99213 OFFICE O/P EST LOW 20 MIN: CPT

## 2019-11-10 ENCOUNTER — EMERGENCY (EMERGENCY)
Facility: HOSPITAL | Age: 64
LOS: 0 days | Discharge: HOME | End: 2019-11-11
Attending: EMERGENCY MEDICINE | Admitting: EMERGENCY MEDICINE
Payer: MEDICARE

## 2019-11-10 VITALS
HEART RATE: 95 BPM | OXYGEN SATURATION: 95 % | RESPIRATION RATE: 20 BRPM | SYSTOLIC BLOOD PRESSURE: 133 MMHG | TEMPERATURE: 95 F | DIASTOLIC BLOOD PRESSURE: 74 MMHG

## 2019-11-10 VITALS
RESPIRATION RATE: 20 BRPM | DIASTOLIC BLOOD PRESSURE: 71 MMHG | HEART RATE: 72 BPM | TEMPERATURE: 97 F | SYSTOLIC BLOOD PRESSURE: 128 MMHG | OXYGEN SATURATION: 97 %

## 2019-11-10 DIAGNOSIS — Z93.1 GASTROSTOMY STATUS: Chronic | ICD-10-CM

## 2019-11-10 DIAGNOSIS — Z93.1 GASTROSTOMY STATUS: ICD-10-CM
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
## 2019-11-10 DIAGNOSIS — T83.510A INFECTION AND INFLAMMATORY REACTION DUE TO CYSTOSTOMY CATHETER, INITIAL ENCOUNTER: ICD-10-CM

## 2019-11-10 DIAGNOSIS — R10.30 LOWER ABDOMINAL PAIN, UNSPECIFIED: ICD-10-CM

## 2019-11-10 DIAGNOSIS — K59.00 CONSTIPATION, UNSPECIFIED: ICD-10-CM

## 2019-11-10 DIAGNOSIS — N30.20 OTHER CHRONIC CYSTITIS WITHOUT HEMATURIA: ICD-10-CM

## 2019-11-10 DIAGNOSIS — Z93.59 OTHER CYSTOSTOMY STATUS: Chronic | ICD-10-CM
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
## 2019-11-10 DIAGNOSIS — Z98.890 OTHER SPECIFIED POSTPROCEDURAL STATES: Chronic | ICD-10-CM
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
## 2019-11-10 LAB
ALBUMIN SERPL ELPH-MCNC: 3.9 G/DL — SIGNIFICANT CHANGE UP (ref 3.5–5.2)
ALP SERPL-CCNC: 96 U/L — SIGNIFICANT CHANGE UP (ref 30–115)
ALT FLD-CCNC: 20 U/L — SIGNIFICANT CHANGE UP (ref 0–41)
ANION GAP SERPL CALC-SCNC: 13 MMOL/L — SIGNIFICANT CHANGE UP (ref 7–14)
AST SERPL-CCNC: 26 U/L — SIGNIFICANT CHANGE UP (ref 0–41)
BASOPHILS # BLD AUTO: 0.03 K/UL — SIGNIFICANT CHANGE UP (ref 0–0.2)
BASOPHILS NFR BLD AUTO: 0.5 % — SIGNIFICANT CHANGE UP (ref 0–1)
BILIRUB SERPL-MCNC: 0.2 MG/DL — SIGNIFICANT CHANGE UP (ref 0.2–1.2)
BUN SERPL-MCNC: 20 MG/DL — SIGNIFICANT CHANGE UP (ref 10–20)
CALCIUM SERPL-MCNC: 8.8 MG/DL — SIGNIFICANT CHANGE UP (ref 8.5–10.1)
CHLORIDE SERPL-SCNC: 105 MMOL/L — SIGNIFICANT CHANGE UP (ref 98–110)
CO2 SERPL-SCNC: 22 MMOL/L — SIGNIFICANT CHANGE UP (ref 17–32)
CREAT SERPL-MCNC: 0.5 MG/DL — LOW (ref 0.7–1.5)
EOSINOPHIL # BLD AUTO: 0.08 K/UL — SIGNIFICANT CHANGE UP (ref 0–0.7)
EOSINOPHIL NFR BLD AUTO: 1.3 % — SIGNIFICANT CHANGE UP (ref 0–8)
GLUCOSE SERPL-MCNC: 122 MG/DL — HIGH (ref 70–99)
HCT VFR BLD CALC: 40.1 % — LOW (ref 42–52)
HGB BLD-MCNC: 13.9 G/DL — LOW (ref 14–18)
IMM GRANULOCYTES NFR BLD AUTO: 0.2 % — SIGNIFICANT CHANGE UP (ref 0.1–0.3)
LACTATE SERPL-SCNC: 1.2 MMOL/L — SIGNIFICANT CHANGE UP (ref 0.5–2.2)
LIDOCAIN IGE QN: 28 U/L — SIGNIFICANT CHANGE UP (ref 7–60)
LYMPHOCYTES # BLD AUTO: 0.68 K/UL — LOW (ref 1.2–3.4)
LYMPHOCYTES # BLD AUTO: 11.4 % — LOW (ref 20.5–51.1)
MCHC RBC-ENTMCNC: 32.2 PG — HIGH (ref 27–31)
MCHC RBC-ENTMCNC: 34.7 G/DL — SIGNIFICANT CHANGE UP (ref 32–37)
MCV RBC AUTO: 92.8 FL — SIGNIFICANT CHANGE UP (ref 80–94)
MONOCYTES # BLD AUTO: 0.36 K/UL — SIGNIFICANT CHANGE UP (ref 0.1–0.6)
MONOCYTES NFR BLD AUTO: 6 % — SIGNIFICANT CHANGE UP (ref 1.7–9.3)
NEUTROPHILS # BLD AUTO: 4.8 K/UL — SIGNIFICANT CHANGE UP (ref 1.4–6.5)
NEUTROPHILS NFR BLD AUTO: 80.6 % — HIGH (ref 42.2–75.2)
NRBC # BLD: 0 /100 WBCS — SIGNIFICANT CHANGE UP (ref 0–0)
PLATELET # BLD AUTO: 205 K/UL — SIGNIFICANT CHANGE UP (ref 130–400)
POTASSIUM SERPL-MCNC: 4.2 MMOL/L — SIGNIFICANT CHANGE UP (ref 3.5–5)
POTASSIUM SERPL-SCNC: 4.2 MMOL/L — SIGNIFICANT CHANGE UP (ref 3.5–5)
PROT SERPL-MCNC: 6.9 G/DL — SIGNIFICANT CHANGE UP (ref 6–8)
RBC # BLD: 4.32 M/UL — LOW (ref 4.7–6.1)
RBC # FLD: 13.2 % — SIGNIFICANT CHANGE UP (ref 11.5–14.5)
SODIUM SERPL-SCNC: 140 MMOL/L — SIGNIFICANT CHANGE UP (ref 135–146)
WBC # BLD: 5.96 K/UL — SIGNIFICANT CHANGE UP (ref 4.8–10.8)
WBC # FLD AUTO: 5.96 K/UL — SIGNIFICANT CHANGE UP (ref 4.8–10.8)

## 2019-11-10 PROCEDURE — 71045 X-RAY EXAM CHEST 1 VIEW: CPT | Mod: 26

## 2019-11-10 PROCEDURE — 99284 EMERGENCY DEPT VISIT MOD MDM: CPT

## 2019-11-10 PROCEDURE — 74177 CT ABD & PELVIS W/CONTRAST: CPT | Mod: 26

## 2019-11-10 RX ORDER — POLYETHYLENE GLYCOL 3350 17 G/17G
17 POWDER, FOR SOLUTION ORAL ONCE
Refills: 0 | Status: DISCONTINUED | OUTPATIENT
Start: 2019-11-10 | End: 2019-11-11

## 2019-11-10 RX ORDER — POLYETHYLENE GLYCOL 3350 17 G/17G
17 POWDER, FOR SOLUTION ORAL
Qty: 1 | Refills: 0
Start: 2019-11-10

## 2019-11-10 NOTE — ED ADULT NURSE NOTE - CHIEF COMPLAINT QUOTE
Pt from [facility], accompanied by aide, c/o pain to suprapubic catheter site.

## 2019-11-10 NOTE — ED PROVIDER NOTE - NSFOLLOWUPINSTRUCTIONS_ED_ALL_ED_FT
Mr. Chaparro was seen in the ED for abdominal pain.  Labs were reassuring.  CT showed constipation and chronic UTI.  He should continue the antibiotics and increase his bowel regimen (miralax 2-3 times a day via PEG, lactulose 1-3 times a day via PEG) and follow up in 1-3 days with his primary physician.    Constipation    Constipation is when a person has fewer than three bowel movements a week, has difficulty having a bowel movement, or has stools that are dry, hard, or larger than normal. Other symptoms can include abdominal pain or bloating. As people grow older, constipation is more common. A low-fiber diet, not taking in enough fluids, and taking certain medicines, including opioid painkillers, may make constipation worse. Treatment varies but may include dietary modifications (more fiber-rich foods), lifestyle modifications, and possible medications.     SEEK IMMEDIATE MEDICAL CARE IF YOU HAVE ANY OF THE FOLLOWING SYMPTOMS: bright red blood in your stool, constipation for longer than 4 days, abdominal or rectal pain, unexplained weight loss, or inability to pass gas.     Urinary Tract Infection    A urinary tract infection (UTI) is an infection of any part of the urinary tract, which includes the kidneys, ureters, bladder, and urethra. Risk factors include ignoring your need to urinate, wiping back to front if female, being an uncircumcised male, and having diabetes or a weak immune system. Symptoms include frequent urination, pain or burning with urination, foul smelling urine, cloudy urine, pain in the lower abdomen, blood in the urine, and fever. If you were prescribed an antibiotic medicine, take it as told by your health care provider. Do not stop taking the antibiotic even if you start to feel better.    SEEK IMMEDIATE MEDICAL CARE IF YOU HAVE ANY OF THE FOLLOWING SYMPTOMS: severe back or abdominal pain, fever, inability to keep fluids or medicine down, dizziness/lightheadedness, or a change in mental status.     Abdominal Pain    Many things can cause abdominal pain. Many times, abdominal pain is not caused by a disease and will improve without treatment. Your health care provider will do a physical exam to determine if there is a dangerous cause of your pain; blood tests and imaging may help determine the cause of your pain. However, in many cases, no cause may be found and you may need further testing as an outpatient. Monitor your abdominal pain for any changes.     SEEK IMMEDIATE MEDICAL CARE IF YOU HAVE ANY OF THE FOLLOWING SYMPTOMS: worsening abdominal pain, uncontrollable vomiting, profuse diarrhea, inability to have bowel movements or pass gas, black or bloody stools, fever accompanying chest pain or back pain, or fainting. These symptoms may represent a serious problem that is an emergency. Do not wait to see if the symptoms will go away. Get medical help right away. Call 911 and do not drive yourself to the hospital.

## 2019-11-10 NOTE — ED ADULT TRIAGE NOTE - CHIEF COMPLAINT QUOTE
Pt from [facility], accompanied by aide, c/o pain to suprapubic catheter site.

## 2019-11-10 NOTE — ED PROVIDER NOTE - CLINICAL SUMMARY MEDICAL DECISION MAKING FREE TEXT BOX
64yM cerebral palsy suprapubic catheter and PEG p/w abd pain, suprapubic. Exam w/ mild abd distension.  Rectal temp neg.  Labs reassuring.  CT w/ chronic sigmoid colonic distension 2/2 constipation and chronic urinary bladder changes c/w cystitis.  Will add miralax to patient's bowel regimen and anticipate d. 64yM cerebral palsy suprapubic catheter and PEG p/w abd pain, suprapubic. Exam w/ mild abd distension.  Rectal temp neg.  Labs reassuring.  CT w/ chronic sigmoid colonic distension 2/2 constipation and chronic urinary bladder changes c/w cystitis.  As per residential staff, pt started on abx for UTI 8d ago.  Pt hemodynamically stable w/o concern for sepsis or obstruction at this time.  Will not change abx at this time, but recommend o/p f/u for further care and return precautions. Will add miralax and lactulose to patient's bowel regimen and anticipate dc.

## 2019-11-10 NOTE — ED PROVIDER NOTE - CARE PLAN
Assessment and plan of treatment:	65 yo M presents to ED for abdominal pain. Patient had suprapubic tenderness.   Will do labs and CT scan Principal Discharge DX:	Constipation  Assessment and plan of treatment:	65 yo M presents to ED for abdominal pain. Patient had suprapubic tenderness.   Will do labs and CT scan  Secondary Diagnosis:	Suprapubic catheter  Secondary Diagnosis:	S/P percutaneous endoscopic gastrostomy (PEG) tube placement Principal Discharge DX:	Constipation  Assessment and plan of treatment:	65 yo M presents to ED for abdominal pain. Patient had suprapubic tenderness.   Will do labs and CT scan  Secondary Diagnosis:	Suprapubic catheter  Secondary Diagnosis:	S/P percutaneous endoscopic gastrostomy (PEG) tube placement  Secondary Diagnosis:	Chronic cystitis

## 2019-11-10 NOTE — ED PROVIDER NOTE - OBJECTIVE STATEMENT
63 yo M presented to ED for abdominal pain that started today. Patient states it is in suprapubic region. According to the aid that is why they came to the hospital. Patient has not had any issues with his suprapubic cath. No nausea or vomiting. PEG tube has been functioning. According to the aid he has been coughing more. Patient also states he feels like he has a fever.

## 2019-11-10 NOTE — ED PROVIDER NOTE - PLAN OF CARE
65 yo M presents to ED for abdominal pain. Patient had suprapubic tenderness.   Will do labs and CT scan

## 2019-11-10 NOTE — ED PROVIDER NOTE - PHYSICAL EXAMINATION
Const: Well nourished, well developed, appears stated age  Eyes: PERRL, no conjunctival injection  HENT:  Neck supple without meningismus   CV: RRR, Warm, well-perfused extremities  RESP: CTA B/L, no tachypnea   GI: soft, + tenderness in suprapubic region. PEG tube in place. Supra-pubic cath in place. No leakage  MSK: No gross deformities appreciated  Skin: Warm, dry. No rashes  Neuro: Alert, CNs II-XII grossly intact. Sensation and motor function of extremities grossly intact.  Psych: Appropriate mood and affect.

## 2019-11-10 NOTE — ED PROVIDER NOTE - NS ED ROS FT
Review of Systems   Constitutional:  No Weight Change, + Fever, No Chills, No weakness    ENT/Mouth:  No Nasal Congestion, No Hoarseness, No sore throat, No Rhinorrhea, No Swallowing Difficulty  Eyes:  No Eye Pain, No Swelling, No Redness, No Discharge, No Vision Changes  Cardiovascular:  No Chest Pain, No palpitations, No Dyspnea on Exertion, No Orthopnea, No  Respiratory:  No SOB, + Cough, No Wheezing  Gastrointestinal:  No Nausea, No Vomiting, No Diarrhea, + abdominal pain, No melena or bright red blood per rectum  Genitourinary:  no leakage, no hematuria,  Musculoskeletal:  No Arthralgias, No Myalgias, No Joint Swelling, No Joint Stiffness,  Skin:  No rashes

## 2019-11-10 NOTE — ED PROVIDER NOTE - PATIENT PORTAL LINK FT
You can access the FollowMyHealth Patient Portal offered by [facility] by registering at the following website: [email or web address]. By joining [employer]’s FollowMyHealth portal, you will also be able to view your health information using other applications (apps) compatible with our system.

## 2019-11-12 NOTE — ASSESSMENT
[FreeTextEntry1] : TISH SHAIKH is a 64 year old male with a past medical history of CP and urethral stricture.  Presents to the office today for a follow up, last seen in office on 9/16/2019. Patient was seen in ER on 10/29/2019 an had SPT changed. Denies fever,chills, or n/v. Currently has drainage bag draining clear urine. Aide Instructed on catheter care. \par

## 2019-11-12 NOTE — HISTORY OF PRESENT ILLNESS
[Currently Experiencing ___] :  [unfilled] [Urinary Retention] : urinary retention [None] : None [FreeTextEntry1] : TISH SHAIKH is a 64 year old male with a past medical history of CP and urethral stricture.  Presents to the office today for a follow up, last seen in office on 9/16/2019. Patient was seen in ER on 10/29/2019 an had SPT changed. Denies fever,chills, or n/v. Currently has drainage bag draining clear urine. Aide Instructed on catheter care. \par

## 2019-11-22 ENCOUNTER — OUTPATIENT (OUTPATIENT)
Dept: OUTPATIENT SERVICES | Facility: HOSPITAL | Age: 64
LOS: 1 days | Discharge: HOME | End: 2019-11-22

## 2019-11-22 ENCOUNTER — APPOINTMENT (OUTPATIENT)
Dept: GASTROENTEROLOGY | Facility: CLINIC | Age: 64
End: 2019-11-22
Payer: MEDICARE

## 2019-11-22 DIAGNOSIS — Z98.890 OTHER SPECIFIED POSTPROCEDURAL STATES: Chronic | ICD-10-CM

## 2019-11-22 DIAGNOSIS — Z93.59 OTHER CYSTOSTOMY STATUS: Chronic | ICD-10-CM

## 2019-11-22 DIAGNOSIS — T18.4XXA FOREIGN BODY IN COLON, INITIAL ENCOUNTER: ICD-10-CM

## 2019-11-22 DIAGNOSIS — Z78.9 OTHER SPECIFIED HEALTH STATUS: ICD-10-CM

## 2019-11-22 DIAGNOSIS — Z93.1 GASTROSTOMY STATUS: Chronic | ICD-10-CM

## 2019-11-22 PROCEDURE — 99214 OFFICE O/P EST MOD 30 MIN: CPT

## 2019-11-22 NOTE — ASSESSMENT
[FreeTextEntry1] : 64 yo M with PMH of cerebral palsy from group home, seizure disorder, spastic paraplegia, s/p PEG tube, BPH, bladder neck stricture s/p suprapubic cath, asthma, nephrolithiasis, chronic constipation came for follow up on foreign body in colon.\par \par # Metallic FB in cecum: Not seen on recent CT scan abdomen and pelvis on 11/10/2019.\par Resolved.\par \par Diarrhea: Likely over dose of laxatives: improving\par Mild thickening noticed on CT scan.\par last Colonoscopy 8/2017: int and ext hemorrhoids, good prep, no polyp.\par Next Colonoscopy in 2022.\par Hold laxatives.\par \par RTC PRN

## 2019-11-22 NOTE — PHYSICAL EXAM
[General Appearance - Alert] : alert [General Appearance - In No Acute Distress] : in no acute distress [Sclera] : the sclera and conjunctiva were normal [PERRL With Normal Accommodation] : pupils were equal in size, round, and reactive to light [Outer Ear] : the ears and nose were normal in appearance [Hearing Threshold Finger Rub Not [place]] : hearing was normal [Neck Appearance] : the appearance of the neck was normal [Neck Cervical Mass (___cm)] : no neck mass was observed [Respiration, Rhythm And Depth] : normal respiratory rhythm and effort [Heart Rate And Rhythm] : heart rate was normal and rhythm regular [Heart Sounds] : normal S1 and S2 [Arterial Pulses Carotid] : carotid pulses were normal with no bruits [Edema] : there was no peripheral edema [Bowel Sounds] : normal bowel sounds [Abdomen Soft] : soft [Abdomen Tenderness] : non-tender [Abdomen Mass (___ Cm)] : no abdominal mass palpated [No CVA Tenderness] : no ~M costovertebral angle tenderness [No Spinal Tenderness] : no spinal tenderness [Nail Clubbing] : no clubbing  or cyanosis of the fingernails [Skin Color & Pigmentation] : normal skin color and pigmentation [] : no rash [FreeTextEntry1] : AAO X1, moving all extremities.

## 2019-11-22 NOTE — REVIEW OF SYSTEMS
[Constipation] : constipation [Fever] : no fever [Chills] : no chills [Eye Pain] : no eye pain [Red Eyes] : eyes not red [Earache] : no earache [Loss Of Hearing] : no hearing loss [Heart Rate Is Slow] : the heart rate was not slow [Heart Rate Is Fast] : the heart rate was not fast [Shortness Of Breath] : no shortness of breath [Wheezing] : no wheezing [Abdominal Pain] : no abdominal pain [Vomiting] : no vomiting [Diarrhea] : no diarrhea [Melena] : no melena [Dysuria] : no dysuria [Incontinence] : no incontinence [Arthralgias] : no arthralgias [Joint Pain] : no joint pain [Easy Bleeding] : no tendency for easy bleeding [Easy Bruising] : no tendency for easy bruising [de-identified] : Cerebral palsy

## 2019-11-22 NOTE — HISTORY OF PRESENT ILLNESS
[Heartburn] : denies heartburn [Nausea] : denies nausea [Vomiting] : denies vomiting [Diarrhea] : denies diarrhea [Constipation] : stable constipation [Yellow Skin Or Eyes (Jaundice)] : denies jaundice [Abdominal Pain] : denies abdominal pain [Abdominal Swelling] : denies abdominal swelling [Rectal Pain] : denies rectal pain [de-identified] : 62 yo M with PMH of cerebral palsy from group home, seizure disorder, spastic paraplegia, s/p PEG tube in 2019 by Dr. parks, BPH, bladder neck stricture s/p suprapubic cath, asthma, nephrolithiasis, came for follow up on metallic foreign body in the cecum since April and loose stools. Now FB in not seen in cecum in last CT scan.\par he is c/o loose stools for last 1 week (5-6 episodes), he is on lactulose and miralax and after holding it is now 2-3 episodes. No fever chills, vomiting or abdominal pain.\par \par Last CT scan 11/10/2019: No FB in cecum\par \par Last Colonoscopy in 2017: Good prep, hemorrhoids.

## 2019-12-06 ENCOUNTER — EMERGENCY (EMERGENCY)
Facility: HOSPITAL | Age: 64
LOS: 0 days | Discharge: HOME | End: 2019-12-06
Attending: EMERGENCY MEDICINE | Admitting: EMERGENCY MEDICINE
Payer: MEDICARE

## 2019-12-06 VITALS
SYSTOLIC BLOOD PRESSURE: 117 MMHG | OXYGEN SATURATION: 98 % | DIASTOLIC BLOOD PRESSURE: 68 MMHG | HEART RATE: 69 BPM | RESPIRATION RATE: 20 BRPM | TEMPERATURE: 98 F

## 2019-12-06 DIAGNOSIS — T83.090A OTHER MECHANICAL COMPLICATION OF CYSTOSTOMY CATHETER, INITIAL ENCOUNTER: ICD-10-CM

## 2019-12-06 DIAGNOSIS — Z93.59 OTHER CYSTOSTOMY STATUS: Chronic | ICD-10-CM
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
## 2019-12-06 DIAGNOSIS — Z98.890 OTHER SPECIFIED POSTPROCEDURAL STATES: ICD-10-CM

## 2019-12-06 DIAGNOSIS — Z93.1 GASTROSTOMY STATUS: Chronic | ICD-10-CM

## 2019-12-06 DIAGNOSIS — Z98.890 OTHER SPECIFIED POSTPROCEDURAL STATES: Chronic | ICD-10-CM

## 2019-12-06 DIAGNOSIS — T83.098A OTHER MECHANICAL COMPLICATION OF OTHER URINARY CATHETER, INITIAL ENCOUNTER: ICD-10-CM
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
## 2019-12-06 PROCEDURE — 99282 EMERGENCY DEPT VISIT SF MDM: CPT

## 2019-12-06 NOTE — ED ADULT NURSE NOTE - OBJECTIVE STATEMENT
Pt a&ox, pt came to ED with suprapubic catheter complications. Pt came to ED to have catheter replaced. Pts catheter replaced by urology. Pt in NAD, VSS. Denies any pain, denies fever/chills. Pt being picked up by own transport.

## 2019-12-06 NOTE — ED PROVIDER NOTE - ATTENDING CONTRIBUTION TO CARE
63 yo m with pmh of seizures, CP, bph, suprapubic cath, presents for cath malfunction.  pt says only started today.  says he thinks the cath was working normally last night.  no abd pain, no flank pain, no fever or chills.  exam: nad, ncat, perrl, eomi, mmm, rrr, ctab, abd with peg and suprapubic cath in place imp: pt with cath malfunction, attempted to replace in ED by PA, balloon deflated but unable to remove old cath

## 2019-12-06 NOTE — ED ADULT NURSE NOTE - PSH
13-Apr-2018
H/O cystoscopy    S/P percutaneous endoscopic gastrostomy (PEG) tube placement    Suprapubic catheter

## 2019-12-06 NOTE — ED PROVIDER NOTE - PATIENT PORTAL LINK FT
You can access the FollowMyHealth Patient Portal offered by [facility] by registering at the following website: [email or web address]. By joining [employer]’s FollowMyHealth portal, you will also be able to view your health information using other applications (apps) compatible with our system.

## 2019-12-06 NOTE — ED ADULT NURSE NOTE - NSIMPLEMENTINTERV_GEN_ALL_ED
Implemented All Fall with Harm Risk Interventions:  [place] to call system. Call bell, personal items and telephone within reach. Instruct patient to call for assistance. Room bathroom lighting operational. Non-slip footwear when patient is off stretcher. Physically safe environment: no spills, clutter or unnecessary equipment. Stretcher in lowest position, wheels locked, appropriate side rails in place. Provide visual cue, wrist band, yellow gown, etc. Monitor gait and stability. Monitor for mental status changes and reorient to person, place, and time. Review medications for side effects contributing to fall risk. Reinforce activity limits and safety measures with patient and family. Provide visual clues: red socks.

## 2019-12-06 NOTE — ED PROVIDER NOTE - PROGRESS NOTE DETAILS
attempted to remove and replace suprapubic cath, can not remove. Uro aware of pt will see in ed. uro able to change cath, working properly will hemalatha.

## 2019-12-06 NOTE — ED PROVIDER NOTE - PHYSICAL EXAMINATION
Physical Exam    Vital Signs: I have reviewed the initial vital signs.  Constitutional: well-nourished, appears stated age, no acute distress  Eyes: Conjunctiva pink, Sclera clear  Cardiovascular: S1 and S2, regular rate, regular rhythm, well-perfused extremities, radial pulses equal and 2+  Respiratory: unlabored respiratory effort, clear to auscultation bilaterally no wheezing, rales and rhonchi  Gastrointestinal: soft, non-tender abdomen, no pulsatile mass, normal bowl sounds  Musculoskeletal: supple neck, no lower extremity edema, no midline tenderness  Integumentary: warm, dry, no rash, suprapubic in place, no sign of erythema or dc  Neurologic: awake, alert, nvi

## 2019-12-06 NOTE — ED PROVIDER NOTE - CHPI ED SYMPTOMS NEG
no decreased eating/drinking/no nausea/no tingling/no numbness/no vomiting/no dizziness/no fever/no pain/no weakness/no chills

## 2019-12-06 NOTE — ED PROVIDER NOTE - OBJECTIVE STATEMENT
65 yo male, pmh of cp, peg and suprapubic, presents to ed for suprapubic cath change. As per pt chnaged every 2 months, cath not working properly since today. No specific complaints at this carlos. Denies fever, chills, pain, cp, sob, abd pain, nvd.

## 2019-12-06 NOTE — ED PROVIDER NOTE - NSFOLLOWUPINSTRUCTIONS_ED_ALL_ED_FT
Indwelling Urinary Catheter Care, Adult  An indwelling urinary catheter is a thin, flexible, germ-free (sterile) tube that is placed into the bladder to help drain urine out of the body. The catheter is inserted into the part of the body that drains urine from the bladder (urethra). Urine drains from the catheter into a drainage bag outside of the body.    Taking good care of your catheter will keep it working properly and help to prevent problems from developing.    What are the risks?  Bacteria may get into your bladder and cause a urinary tract infection.  Urine flow can become blocked. This can happen if the catheter is not working correctly, or if you have sediment or a blood clot in your bladder or the catheter.  Tissue near the catheter may become irritated and bleed.  How to wear your catheter and your drainage bag  Supplies needed     Adhesive tape or a leg strap.  Alcohol wipe or soap and water (if you use tape).  A clean towel (if you use tape).  Overnight drainage bag.  Smaller drainage bag (leg bag).  Wearing your catheter and bag     Use adhesive tape or a leg strap to attach your catheter to your leg.    Make sure the catheter is not pulled tight.  If a leg strap gets wet, replace it with a dry one.  If you use adhesive tape:    Use an alcohol wipe or soap and water to wash off any stickiness on your skin where you had tape before.  Use a clean towel to pat-dry the area.  Apply the new tape.      You should have received a large overnight drainage bag and a smaller leg bag that fits underneath clothing.     You may wear the overnight bag at any time, but you should not wear the leg bag at night.  Always wear the leg bag below your knee.  Make sure the overnight drainage bag is always lower than the level of your bladder, but do not let it touch the floor. Before you go to sleep, hang the bag inside a wastebasket that is covered by a clean plastic bag.    How to care for your skin around the catheter  Supplies needed     A clean washcloth.  Water and mild soap.  A clean towel.  Caring for your skin and catheter     Image Image   Every day, use a clean washcloth and soapy water to clean the skin around your catheter.    Wash your hands with soap and water.  Wet a washcloth in warm water and mild soap.  Clean the skin around your urethra.    If you are female:    Use one hand to gently spread the folds of skin around your vagina (labia).  With the washcloth in your other hand, wipe the inner side of your labia on each side. Do this in a front-to-back direction.    If you are male:    Use one hand to pull back any skin that covers the end of your penis (foreskin).  With the washcloth in your other hand, wipe your penis in small circles. Start wiping at the tip of your penis, then move outward from the catheter.      With your free hand, hold the catheter close to where it enters your body. Keep holding the catheter during cleaning so it does not get pulled out.  Use your other hand to clean the catheter with the washcloth.    Only wipe downward on the catheter.  Do not wipe upward toward your body, because that may push bacteria into your urethra and cause infection.    Use a clean towel to pat-dry the catheter and the skin around it. Make sure to wipe off all soap.  Wash your hands with soap and water.    Shower every day. Do not take baths.  Do not use cream, ointment, or lotion on the area where the catheter enters your body, unless your health care provider tells you to do that.  Do not use powders, sprays, or lotions on your genital area.  Check your skin around the catheter every day for signs of infection. Check for:    Redness, swelling, or pain.  Fluid or blood.  Warmth.  Pus or a bad smell.    How to empty the drainage bag  Supplies needed     Rubbing alcohol.  Gauze pad or cotton ball.  Adhesive tape or a leg strap.  Emptying the bag     Empty your drainage bag (your overnight drainage bag or your leg bag) when it is ?–½ full, or at least 2–3 times a day. Do not clean your drainage bag unless your health care provider tells you to do that.    Wash your hands with soap and water.    Detach the drainage bag from your leg.    Hold the drainage bag over the toilet or a clean container. Make sure the drainage bag is lower than your hips and bladder. This stops urine from going back into the tubing and into your bladder.    Open the pour spout at the bottom of the bag.    Empty the urine into the toilet or container. Do not let the pour spout touch any surface. This precaution is important to prevent bacteria from getting in the bag and causing infection.    Apply rubbing alcohol to a gauze pad or cotton ball.    Use the gauze pad or cotton ball to clean the pour spout.    Close the pour spout.    Attach the bag to your leg with adhesive tape or a leg strap.    Wash your hands with soap and water.      How to change the drainage bag  Supplies needed:     Alcohol wipes.  A clean drainage bag.  Adhesive tape or a leg strap.  Changing the bag     Replace your drainage bag with a clean bag once a month. Replace the bag sooner if it leaks, starts to smell bad, or looks dirty.    Wash your hands with soap and water.    Detach the dirty drainage bag from your leg.    Pinch the catheter with your fingers so that urine does not spill out.    Disconnect the catheter tube from the drainage tube at the connection valve. Do not let the tubes touch any surface.    Clean the end of the catheter tube with an alcohol wipe. Use a different alcohol wipe to clean the end of the drainage tube.    Connect the catheter tube to the drainage tube of the clean bag.    Attach the clean bag to your leg with adhesive tape or a leg strap. Avoid attaching the new bag too tightly.    Wash your hands with soap and water.      General instructions  Never pull on your catheter or try to remove it. Pulling can damage your internal tissues.  Always wash your hands before and after you handle your catheter or drainage bag. Use a mild, fragrance-free soap. If soap and water are not available, use hand .  Always make sure there are no twists or bends (kinks) in the catheter tube.  Always make sure there are no leaks in the catheter or drainage bag.  Drink enough fluid to keep your urine pale yellow.  Do not take baths, swim, or use a hot tub.  ImageIf you are female, wipe from front to back after having a bowel movement.  Contact a health care provider if:  Your urine is cloudy.  Your urine smells unusually bad.  Your catheter gets clogged.  Your catheter starts to leak.  Your bladder feels full.  Get help right away if:  You have redness, swelling, or pain where the catheter enters your body.  You have fluid, blood, pus, or a bad smell coming from the area where the catheter enters your body.  The area where the catheter enters your body feels warm to the touch.  You have a fever.  You have pain in your abdomen, legs, lower back, or bladder.  You see blood in the catheter.  Your urine is pink or red.  You have nausea, vomiting, or chills.  Your urine is not draining into the bag.  Your catheter gets pulled out.  Summary  An indwelling urinary catheter is a thin, flexible, germ-free (sterile) tube that is placed into the bladder to help drain urine out of the body.  The catheter is inserted into the part of the body that drains urine from the bladder (urethra).  Take good care of your catheter to keep it working properly and help prevent problems from developing.  Always wash your hands before and after you handle your catheter or drainage bag.  Never pull on your catheter or try to remove it.  This information is not intended to replace advice given to you by your health care provider. Make sure you discuss any questions you have with your health care provider.

## 2019-12-09 ENCOUNTER — APPOINTMENT (OUTPATIENT)
Dept: UROLOGY | Facility: CLINIC | Age: 64
End: 2019-12-09
Payer: MEDICARE

## 2019-12-09 DIAGNOSIS — R31.0 GROSS HEMATURIA: ICD-10-CM

## 2019-12-09 PROCEDURE — 99213 OFFICE O/P EST LOW 20 MIN: CPT

## 2019-12-09 NOTE — PHYSICAL EXAM
[General Appearance - Well Developed] : well developed [General Appearance - Well Nourished] : well nourished [Normal Appearance] : normal appearance [Well Groomed] : well groomed [General Appearance - In No Acute Distress] : no acute distress [Abdomen Tenderness] : non-tender [Abdomen Soft] : soft [Costovertebral Angle Tenderness] : no ~M costovertebral angle tenderness [Edema] : no peripheral edema [] : no respiratory distress [Respiration, Rhythm And Depth] : normal respiratory rhythm and effort [Exaggerated Use Of Accessory Muscles For Inspiration] : no accessory muscle use [Affect] : the affect was normal [Not Anxious] : not anxious [Mood] : the mood was normal

## 2019-12-17 ENCOUNTER — MEDICATION RENEWAL (OUTPATIENT)
Age: 64
End: 2019-12-17

## 2019-12-31 PROBLEM — R31.0 GROSS HEMATURIA: Status: ACTIVE | Noted: 2019-12-31

## 2019-12-31 NOTE — ASSESSMENT
[FreeTextEntry1] : TISH SHAIKH is a 64 year old male with a past medical history of CP and urethral stricture. Presents to the office today for a follow up, last seen in office on 11/4/2019 for SPT changed after being seen in ER for catheter complications. Denies fever,chills, or n/v. Currently has drainage bag and drainage mary with small blood, aide states that urine has not been draining in drainage bag. He was seen in ER on 12/6/2019 and catheter was changed .Aide Instructed on catheter care. \par \par SPT changed 22 fr changed, 350 cc of mary urine drained. ABle to irrigate catheter without difficulty. \par 
Pre-application: Motor, sensory, and vascular responses intact in the injured extremity./Post-application: Motor, sensory, and vascular responses intact in the injured extremity.

## 2019-12-31 NOTE — HISTORY OF PRESENT ILLNESS
[Currently Experiencing ___] :  [unfilled] [Urinary Retention] : urinary retention [FreeTextEntry1] : TISH SHAIKH is a 64 year old male with a past medical history of CP and urethral stricture. Presents to the office today for a follow up, last seen in office on 11/4/2019 for SPT changed after being seen in ER for catheter complications. Denies fever,chills, or n/v. Currently has drainage bag and drainage mary with small blood, aide states that urine has not been draining in drainage bag. He was seen in ER on 12/6/2019 and catheter was changed .Aide Instructed on catheter care. \par \par SPT changed 22 fr changed, 350 cc of mary urine drained. ABle to irrigate catheter without difficulty. \par

## 2020-01-06 ENCOUNTER — APPOINTMENT (OUTPATIENT)
Dept: UROLOGY | Facility: CLINIC | Age: 65
End: 2020-01-06
Payer: MEDICARE

## 2020-01-06 PROCEDURE — 51705 CHANGE OF BLADDER TUBE: CPT

## 2020-02-04 ENCOUNTER — APPOINTMENT (OUTPATIENT)
Dept: UROLOGY | Facility: CLINIC | Age: 65
End: 2020-02-04
Payer: MEDICARE

## 2020-02-04 PROCEDURE — 51702 INSERT TEMP BLADDER CATH: CPT

## 2020-02-05 ENCOUNTER — OUTPATIENT (OUTPATIENT)
Dept: OUTPATIENT SERVICES | Facility: HOSPITAL | Age: 65
LOS: 1 days | Discharge: HOME | End: 2020-02-05

## 2020-02-05 DIAGNOSIS — Z93.1 GASTROSTOMY STATUS: Chronic | ICD-10-CM

## 2020-02-05 DIAGNOSIS — Z98.890 OTHER SPECIFIED POSTPROCEDURAL STATES: Chronic | ICD-10-CM

## 2020-02-05 DIAGNOSIS — K02.63 DENTAL CARIES ON SMOOTH SURFACE PENETRATING INTO PULP: ICD-10-CM

## 2020-02-05 DIAGNOSIS — Z93.59 OTHER CYSTOSTOMY STATUS: Chronic | ICD-10-CM

## 2020-02-10 ENCOUNTER — APPOINTMENT (OUTPATIENT)
Dept: UROLOGY | Facility: CLINIC | Age: 65
End: 2020-02-10

## 2020-02-11 ENCOUNTER — OUTPATIENT (OUTPATIENT)
Dept: OUTPATIENT SERVICES | Facility: HOSPITAL | Age: 65
LOS: 1 days | Discharge: HOME | End: 2020-02-11

## 2020-02-11 ENCOUNTER — APPOINTMENT (OUTPATIENT)
Dept: NEPHROLOGY | Facility: CLINIC | Age: 65
End: 2020-02-11
Payer: MEDICARE

## 2020-02-11 VITALS — SYSTOLIC BLOOD PRESSURE: 106 MMHG | DIASTOLIC BLOOD PRESSURE: 66 MMHG | HEART RATE: 65 BPM

## 2020-02-11 DIAGNOSIS — Z93.1 GASTROSTOMY STATUS: ICD-10-CM

## 2020-02-11 DIAGNOSIS — Z93.59 OTHER CYSTOSTOMY STATUS: Chronic | ICD-10-CM

## 2020-02-11 DIAGNOSIS — Z98.890 OTHER SPECIFIED POSTPROCEDURAL STATES: Chronic | ICD-10-CM

## 2020-02-11 DIAGNOSIS — Z93.1 GASTROSTOMY STATUS: Chronic | ICD-10-CM

## 2020-02-11 PROCEDURE — 99211 OFF/OP EST MAY X REQ PHY/QHP: CPT | Mod: GC

## 2020-02-11 NOTE — ASSESSMENT
[FreeTextEntry1] : 62 y/o M w/ PMhx of CP (wheelchair bound), s/p PEG, s/p suprapubic catheter d/t urethral strictures here for follow up for nephrolithiasis after 24 h urine collection.\par Currently he feels at baseline.\par  \par #nephrolithiasis\par - CMP 11/2019 WNL\par - 11/2019 CTAP: no hydronephrosis, punctate nonobstructing R upper pole calculus\par - C/w potassium citrate, will repeat potassium citrate 24h urine prior to next visit\par - Renal US before next visit\par - advise drinking a lot of water\par - f/u in 6 months

## 2020-02-11 NOTE — HISTORY OF PRESENT ILLNESS
[FreeTextEntry1] : 65 y/o M w/ PMhx of CP (wheelchair bound), s/p PEG, s/p suprapubic catheter d/t urethral strictures here for follow up for nephrolithiasis last seen on August 6, 2019.\par \par Denies dysuria, hematuria. \par No flank pain.\par States his suprapubic catheter is draining sometimes, and sometimes it does not.\par Currently feels at baseline.

## 2020-02-11 NOTE — PHYSICAL EXAM
[General Appearance - Well Nourished] : well nourished [General Appearance - Alert] : alert [Sclera] : the sclera and conjunctiva were normal [Neck Appearance] : the appearance of the neck was normal [Auscultation Breath Sounds / Voice Sounds] : lungs were clear to auscultation bilaterally [Apical Impulse] : the apical impulse was normal [Heart Rate And Rhythm] : heart rate was normal and rhythm regular [Heart Sounds] : normal S1 and S2 [Bowel Sounds] : normal bowel sounds [Abdomen Soft] : soft [Abdomen Tenderness] : non-tender [No CVA Tenderness] : no ~M costovertebral angle tenderness [No Spinal Tenderness] : no spinal tenderness [Musculoskeletal - Swelling] : no joint swelling seen [Skin Turgor] : normal skin turgor [Oriented To Time, Place, And Person] : oriented to person, place, and time [] : no rash [FreeTextEntry1] : + suprapubic catheter

## 2020-02-28 NOTE — ED ADULT NURSE NOTE - GASTROINTESTINAL WDL
Okay to refill #90, no refills.  Needs appt for CPE.    Abdomen soft, nontender, nondistended, bowel sounds present in all 4 quadrants.

## 2020-03-09 ENCOUNTER — APPOINTMENT (OUTPATIENT)
Dept: UROLOGY | Facility: CLINIC | Age: 65
End: 2020-03-09
Payer: MEDICARE

## 2020-03-09 PROCEDURE — 51705 CHANGE OF BLADDER TUBE: CPT

## 2020-03-20 ENCOUNTER — INPATIENT (INPATIENT)
Facility: HOSPITAL | Age: 65
LOS: 6 days | Discharge: NURSING, REHAB OR LONG-TERM CARE | End: 2020-03-27
Attending: INTERNAL MEDICINE | Admitting: INTERNAL MEDICINE
Payer: MEDICARE

## 2020-03-20 VITALS
RESPIRATION RATE: 20 BRPM | DIASTOLIC BLOOD PRESSURE: 70 MMHG | TEMPERATURE: 101 F | OXYGEN SATURATION: 96 % | HEART RATE: 94 BPM | SYSTOLIC BLOOD PRESSURE: 106 MMHG

## 2020-03-20 DIAGNOSIS — Z93.59 OTHER CYSTOSTOMY STATUS: Chronic | ICD-10-CM

## 2020-03-20 DIAGNOSIS — D72.810 LYMPHOCYTOPENIA: ICD-10-CM

## 2020-03-20 DIAGNOSIS — M81.0 AGE-RELATED OSTEOPOROSIS WITHOUT CURRENT PATHOLOGICAL FRACTURE: ICD-10-CM

## 2020-03-20 DIAGNOSIS — J06.9 ACUTE UPPER RESPIRATORY INFECTION, UNSPECIFIED: ICD-10-CM

## 2020-03-20 DIAGNOSIS — I95.9 HYPOTENSION, UNSPECIFIED: ICD-10-CM

## 2020-03-20 DIAGNOSIS — R33.9 RETENTION OF URINE, UNSPECIFIED: ICD-10-CM

## 2020-03-20 DIAGNOSIS — R06.03 ACUTE RESPIRATORY DISTRESS: ICD-10-CM

## 2020-03-20 DIAGNOSIS — B34.2 CORONAVIRUS INFECTION, UNSPECIFIED: ICD-10-CM

## 2020-03-20 DIAGNOSIS — J20.8 ACUTE BRONCHITIS DUE TO OTHER SPECIFIED ORGANISMS: ICD-10-CM

## 2020-03-20 DIAGNOSIS — Z87.442 PERSONAL HISTORY OF URINARY CALCULI: ICD-10-CM

## 2020-03-20 DIAGNOSIS — K59.04 CHRONIC IDIOPATHIC CONSTIPATION: ICD-10-CM

## 2020-03-20 DIAGNOSIS — G80.0 SPASTIC QUADRIPLEGIC CEREBRAL PALSY: ICD-10-CM

## 2020-03-20 DIAGNOSIS — Z93.1 GASTROSTOMY STATUS: ICD-10-CM

## 2020-03-20 DIAGNOSIS — Z98.890 OTHER SPECIFIED POSTPROCEDURAL STATES: Chronic | ICD-10-CM

## 2020-03-20 DIAGNOSIS — N32.0 BLADDER-NECK OBSTRUCTION: ICD-10-CM

## 2020-03-20 DIAGNOSIS — J90 PLEURAL EFFUSION, NOT ELSEWHERE CLASSIFIED: ICD-10-CM

## 2020-03-20 DIAGNOSIS — J45.909 UNSPECIFIED ASTHMA, UNCOMPLICATED: ICD-10-CM

## 2020-03-20 DIAGNOSIS — N40.1 BENIGN PROSTATIC HYPERPLASIA WITH LOWER URINARY TRACT SYMPTOMS: ICD-10-CM

## 2020-03-20 DIAGNOSIS — G40.909 EPILEPSY, UNSPECIFIED, NOT INTRACTABLE, WITHOUT STATUS EPILEPTICUS: ICD-10-CM

## 2020-03-20 DIAGNOSIS — Z93.1 GASTROSTOMY STATUS: Chronic | ICD-10-CM

## 2020-03-20 LAB
ALBUMIN SERPL ELPH-MCNC: 4.2 G/DL — SIGNIFICANT CHANGE UP (ref 3.5–5.2)
ALP SERPL-CCNC: 98 U/L — SIGNIFICANT CHANGE UP (ref 30–115)
ALT FLD-CCNC: 17 U/L — SIGNIFICANT CHANGE UP (ref 0–41)
ANION GAP SERPL CALC-SCNC: 14 MMOL/L — SIGNIFICANT CHANGE UP (ref 7–14)
APPEARANCE UR: ABNORMAL
APTT BLD: 29.3 SEC — SIGNIFICANT CHANGE UP (ref 27–39.2)
AST SERPL-CCNC: 29 U/L — SIGNIFICANT CHANGE UP (ref 0–41)
BACTERIA # UR AUTO: ABNORMAL
BASOPHILS # BLD AUTO: 0 K/UL — SIGNIFICANT CHANGE UP (ref 0–0.2)
BASOPHILS NFR BLD AUTO: 0 % — SIGNIFICANT CHANGE UP (ref 0–1)
BILIRUB SERPL-MCNC: 0.3 MG/DL — SIGNIFICANT CHANGE UP (ref 0.2–1.2)
BILIRUB UR-MCNC: NEGATIVE — SIGNIFICANT CHANGE UP
BUN SERPL-MCNC: 22 MG/DL — HIGH (ref 10–20)
CALCIUM SERPL-MCNC: 9.5 MG/DL — SIGNIFICANT CHANGE UP (ref 8.5–10.1)
CHLORIDE SERPL-SCNC: 103 MMOL/L — SIGNIFICANT CHANGE UP (ref 98–110)
CO2 SERPL-SCNC: 24 MMOL/L — SIGNIFICANT CHANGE UP (ref 17–32)
COLOR SPEC: YELLOW — SIGNIFICANT CHANGE UP
CREAT SERPL-MCNC: 0.7 MG/DL — SIGNIFICANT CHANGE UP (ref 0.7–1.5)
DIFF PNL FLD: SIGNIFICANT CHANGE UP
EOSINOPHIL # BLD AUTO: 0 K/UL — SIGNIFICANT CHANGE UP (ref 0–0.7)
EOSINOPHIL NFR BLD AUTO: 0 % — SIGNIFICANT CHANGE UP (ref 0–8)
EPI CELLS # UR: 1 /HPF — SIGNIFICANT CHANGE UP (ref 0–5)
FLU A RESULT: NEGATIVE — SIGNIFICANT CHANGE UP
FLU A RESULT: NEGATIVE — SIGNIFICANT CHANGE UP
FLUAV AG NPH QL: NEGATIVE — SIGNIFICANT CHANGE UP
FLUBV AG NPH QL: NEGATIVE — SIGNIFICANT CHANGE UP
GAS PNL BLDV: SIGNIFICANT CHANGE UP
GIANT PLATELETS BLD QL SMEAR: PRESENT — SIGNIFICANT CHANGE UP
GLUCOSE SERPL-MCNC: 124 MG/DL — HIGH (ref 70–99)
GLUCOSE UR QL: NEGATIVE — SIGNIFICANT CHANGE UP
HCT VFR BLD CALC: 42.2 % — SIGNIFICANT CHANGE UP (ref 42–52)
HGB BLD-MCNC: 14.5 G/DL — SIGNIFICANT CHANGE UP (ref 14–18)
HYALINE CASTS # UR AUTO: 8 /LPF — HIGH (ref 0–7)
INR BLD: 1.07 RATIO — SIGNIFICANT CHANGE UP (ref 0.65–1.3)
KETONES UR-MCNC: NEGATIVE — SIGNIFICANT CHANGE UP
LEUKOCYTE ESTERASE UR-ACNC: ABNORMAL
LYMPHOCYTES # BLD AUTO: 0.09 K/UL — LOW (ref 1.2–3.4)
LYMPHOCYTES # BLD AUTO: 0.9 % — LOW (ref 20.5–51.1)
MANUAL SMEAR VERIFICATION: SIGNIFICANT CHANGE UP
MCHC RBC-ENTMCNC: 32.1 PG — HIGH (ref 27–31)
MCHC RBC-ENTMCNC: 34.4 G/DL — SIGNIFICANT CHANGE UP (ref 32–37)
MCV RBC AUTO: 93.4 FL — SIGNIFICANT CHANGE UP (ref 80–94)
MONOCYTES # BLD AUTO: 0.42 K/UL — SIGNIFICANT CHANGE UP (ref 0.1–0.6)
MONOCYTES NFR BLD AUTO: 4.3 % — SIGNIFICANT CHANGE UP (ref 1.7–9.3)
MYELOCYTES NFR BLD: 0.9 % — HIGH (ref 0–0)
NEUTROPHILS # BLD AUTO: 9.16 K/UL — HIGH (ref 1.4–6.5)
NEUTROPHILS NFR BLD AUTO: 93.9 % — HIGH (ref 42.2–75.2)
NITRITE UR-MCNC: POSITIVE
PH UR: 8 — SIGNIFICANT CHANGE UP (ref 5–8)
PLAT MORPH BLD: NORMAL — SIGNIFICANT CHANGE UP
PLATELET # BLD AUTO: 179 K/UL — SIGNIFICANT CHANGE UP (ref 130–400)
POTASSIUM SERPL-MCNC: 4.1 MMOL/L — SIGNIFICANT CHANGE UP (ref 3.5–5)
POTASSIUM SERPL-SCNC: 4.1 MMOL/L — SIGNIFICANT CHANGE UP (ref 3.5–5)
PROT SERPL-MCNC: 7.6 G/DL — SIGNIFICANT CHANGE UP (ref 6–8)
PROT UR-MCNC: ABNORMAL
PROTHROM AB SERPL-ACNC: 12.3 SEC — SIGNIFICANT CHANGE UP (ref 9.95–12.87)
RBC # BLD: 4.52 M/UL — LOW (ref 4.7–6.1)
RBC # FLD: 13 % — SIGNIFICANT CHANGE UP (ref 11.5–14.5)
RBC BLD AUTO: ABNORMAL
RBC CASTS # UR COMP ASSIST: 4 /HPF — SIGNIFICANT CHANGE UP (ref 0–4)
RSV RESULT: NEGATIVE — SIGNIFICANT CHANGE UP
RSV RNA RESP QL NAA+PROBE: NEGATIVE — SIGNIFICANT CHANGE UP
SODIUM SERPL-SCNC: 141 MMOL/L — SIGNIFICANT CHANGE UP (ref 135–146)
SP GR SPEC: 1.02 — SIGNIFICANT CHANGE UP (ref 1.01–1.02)
TROPONIN T SERPL-MCNC: <0.01 NG/ML — SIGNIFICANT CHANGE UP
UROBILINOGEN FLD QL: SIGNIFICANT CHANGE UP
WBC # BLD: 9.75 K/UL — SIGNIFICANT CHANGE UP (ref 4.8–10.8)
WBC # FLD AUTO: 9.75 K/UL — SIGNIFICANT CHANGE UP (ref 4.8–10.8)
WBC UR QL: 48 /HPF — HIGH (ref 0–5)

## 2020-03-20 PROCEDURE — 99291 CRITICAL CARE FIRST HOUR: CPT | Mod: GC

## 2020-03-20 PROCEDURE — 71045 X-RAY EXAM CHEST 1 VIEW: CPT | Mod: 26

## 2020-03-20 PROCEDURE — 93010 ELECTROCARDIOGRAM REPORT: CPT

## 2020-03-20 RX ORDER — ALBUTEROL 90 UG/1
1 AEROSOL, METERED ORAL ONCE
Refills: 0 | Status: COMPLETED | OUTPATIENT
Start: 2020-03-20 | End: 2020-03-20

## 2020-03-20 RX ORDER — ACETAMINOPHEN 500 MG
650 TABLET ORAL ONCE
Refills: 0 | Status: COMPLETED | OUTPATIENT
Start: 2020-03-20 | End: 2020-03-20

## 2020-03-20 RX ORDER — ENOXAPARIN SODIUM 100 MG/ML
40 INJECTION SUBCUTANEOUS DAILY
Refills: 0 | Status: DISCONTINUED | OUTPATIENT
Start: 2020-03-20 | End: 2020-03-27

## 2020-03-20 RX ORDER — MEROPENEM 1 G/30ML
1000 INJECTION INTRAVENOUS EVERY 8 HOURS
Refills: 0 | Status: DISCONTINUED | OUTPATIENT
Start: 2020-03-20 | End: 2020-03-21

## 2020-03-20 RX ORDER — SODIUM CHLORIDE 9 MG/ML
1000 INJECTION, SOLUTION INTRAVENOUS ONCE
Refills: 0 | Status: COMPLETED | OUTPATIENT
Start: 2020-03-20 | End: 2020-03-20

## 2020-03-20 RX ORDER — PHENOBARBITAL 60 MG
64.8 TABLET ORAL DAILY
Refills: 0 | Status: DISCONTINUED | OUTPATIENT
Start: 2020-03-20 | End: 2020-03-21

## 2020-03-20 RX ORDER — ACETAMINOPHEN 500 MG
975 TABLET ORAL ONCE
Refills: 0 | Status: DISCONTINUED | OUTPATIENT
Start: 2020-03-20 | End: 2020-03-20

## 2020-03-20 RX ORDER — BACLOFEN 100 %
10 POWDER (GRAM) MISCELLANEOUS THREE TIMES A DAY
Refills: 0 | Status: DISCONTINUED | OUTPATIENT
Start: 2020-03-20 | End: 2020-03-21

## 2020-03-20 RX ORDER — HYDROXYCHLOROQUINE SULFATE 200 MG
400 TABLET ORAL
Refills: 0 | Status: DISCONTINUED | OUTPATIENT
Start: 2020-03-20 | End: 2020-03-21

## 2020-03-20 RX ORDER — AZITHROMYCIN 500 MG/1
500 TABLET, FILM COATED ORAL ONCE
Refills: 0 | Status: COMPLETED | OUTPATIENT
Start: 2020-03-20 | End: 2020-03-20

## 2020-03-20 RX ORDER — CEFTRIAXONE 500 MG/1
1000 INJECTION, POWDER, FOR SOLUTION INTRAMUSCULAR; INTRAVENOUS ONCE
Refills: 0 | Status: COMPLETED | OUTPATIENT
Start: 2020-03-20 | End: 2020-03-20

## 2020-03-20 RX ORDER — VANCOMYCIN HCL 1 G
VIAL (EA) INTRAVENOUS
Refills: 0 | Status: DISCONTINUED | OUTPATIENT
Start: 2020-03-20 | End: 2020-03-21

## 2020-03-20 RX ADMIN — Medication 650 MILLIGRAM(S): at 20:01

## 2020-03-20 RX ADMIN — ALBUTEROL 1 PUFF(S): 90 AEROSOL, METERED ORAL at 19:08

## 2020-03-20 RX ADMIN — SODIUM CHLORIDE 1000 MILLILITER(S): 9 INJECTION, SOLUTION INTRAVENOUS at 19:08

## 2020-03-20 RX ADMIN — Medication 125 MILLIGRAM(S): at 19:08

## 2020-03-20 RX ADMIN — AZITHROMYCIN 255 MILLIGRAM(S): 500 TABLET, FILM COATED ORAL at 20:51

## 2020-03-20 RX ADMIN — CEFTRIAXONE 100 MILLIGRAM(S): 500 INJECTION, POWDER, FOR SOLUTION INTRAMUSCULAR; INTRAVENOUS at 20:00

## 2020-03-20 NOTE — ED PROVIDER NOTE - ATTENDING CONTRIBUTION TO CARE
65 yo M with PMH of cerebral palsy presents to ED for cough that started earlier today. Patient also states he is mildly SOB. No fever or chills. No CP, nausea, vomiting, diarrhea.     On PE patient is in NAD. Patient is contracted. Normal RRR. Lungs demonstrate expiratory wheezes. Abdomen SNTND. patient has alatorre.     Concern for pneumonia vs COVID-19. Will do CXR, labs.

## 2020-03-20 NOTE — ED PROVIDER NOTE - OBJECTIVE STATEMENT
64y M w/ PMH of cerebral palsy from group home, seizure disorder, spastic paraplegia, s/p PEG tube, BPH, bladder neck stricture s/p suprapubic cath hx of ESBL -proteus and pseudomonas , asthma, nephrolithiasis, and chronic constipation presents with fever and wet cough that started earlier today. Per caretaker, pt coughs and is unable to mobilize the phlegm. Gave breathing treatments without relief. Denies headache, CP, abd pain, n/v/d, or numbness/tingling.

## 2020-03-20 NOTE — H&P ADULT - ASSESSMENT
IMPRESSION:    Pneumonia (R/o CAP / COVID pending)  History of seizure  Cerebral palsy      PLAN:    CNS:  Avoid sedation  Continue home seizure medication    HEENT: Oral care    PULMONARY:    HOB @ 45 degrees  Continue with 3liters nasal cannula, may need intubation if worsen    CARDIOVASCULAR:  CE x 2, 2D echo    GI: GI prophylaxis.  Feeding NPO for now    RENAL:  Follow up lytes.  Correct as needed    INFECTIOUS DISEASE:  c/w vancomycin and meropenem for now, Nasal MRSA, Urine legionella  Follow up cultures  Panculture    HEMATOLOGICAL:    DVT prophylaxis.    ENDOCRINE:    Follow up FS.  Insulin protocol if needed.    MUSCULOSKELETAL:   DVT ppx IMPRESSION:    Pneumonia (R/o CAP / COVID pending)  History of seizure  Cerebral palsy      PLAN:    CNS:  Avoid sedation  Continue home seizure medication    HEENT: Oral care    PULMONARY:    HOB @ 45 degrees  Continue with 3liters nasal cannula, may need intubation if worsen    CARDIOVASCULAR:  CE x 2, 2D echo    GI: GI prophylaxis.  Feeding NPO for now    RENAL:  Follow up lytes.  Correct as needed    INFECTIOUS DISEASE:  c/w vancomycin and meropenem for now, Nasal MRSA, Urine legionella  Follow up cultures  Panculture    HEMATOLOGICAL:    DVT prophylaxis.    ENDOCRINE:    Follow up FS.  Insulin protocol if needed.    MUSCULOSKELETAL:   DVT ppx    Discussed with sister Leslie over the phone . Updated about present status. Will be full code for now.

## 2020-03-20 NOTE — ED PROVIDER NOTE - PHYSICAL EXAMINATION
CONSTITUTIONAL: Well-developed; well-nourished; in no acute distress.   SKIN: warm, dry  HEAD: Normocephalic; atraumatic.  EYES: PERRL, EOMI, no conjunctival erythema  ENT: No nasal discharge; airway clear.  NECK: Supple; non tender.  CARD: S1, S2 normal; no murmurs, gallops, or rubs. Regular rate and rhythm.   RESP: Increased work of breathing. Coarse breath sounds with mod expiratory wheeze diffusely.  ABD: soft ntnd  EXT: Normal ROM.  No clubbing, cyanosis or edema.   LYMPH: No acute cervical adenopathy.  NEURO: Alert, oriented, grossly unremarkable  PSYCH: Cooperative, appropriate.

## 2020-03-20 NOTE — ED PROVIDER NOTE - PROGRESS NOTE DETAILS
Patient has worsening tachypnea with gurgling breath sounds. Patient feels more SOB. oxygen 95% on RA

## 2020-03-20 NOTE — ED ADULT NURSE NOTE - OBJECTIVE STATEMENT
Patient BIBA from group home with cough and fever. Patient has hx of cerebral palsy and is quadriplegic. Suprapubic alatorre and peg tube intact. Patient is AxOx4. Patient states he's been having intermittent sob.

## 2020-03-20 NOTE — ED ADULT NURSE NOTE - NSIMPLEMENTINTERV_GEN_ALL_ED
Implemented All Fall with Harm Risk Interventions:  [place] to call system. Call bell, personal items and telephone within reach. Instruct patient to call for assistance. Room bathroom lighting operational. Non-slip footwear when patient is off stretcher. Physically safe environment: no spills, clutter or unnecessary equipment. Stretcher in lowest position, wheels locked, appropriate side rails in place. Provide visual cue, wrist band, yellow gown, etc. Monitor gait and stability. Monitor for mental status changes and reorient to person, place, and time. Review medications for side effects contributing to fall risk. Reinforce activity limits and safety measures with patient and family. Provide visual clues: red socks.

## 2020-03-20 NOTE — H&P ADULT - NSHPLABSRESULTS_GEN_ALL_CORE
14.5   9.75  )-----------( 179      ( 20 Mar 2020 19:00 )             42.2       03-20    141  |  103  |  22<H>  ----------------------------<  124<H>  4.1   |  24  |  0.7    Ca    9.5      20 Mar 2020 19:00    TPro  7.6  /  Alb  4.2  /  TBili  0.3  /  DBili  x   /  AST  29  /  ALT  17  /  AlkPhos  98  03-20

## 2020-03-20 NOTE — H&P ADULT - HISTORY OF PRESENT ILLNESS
64y M w/ PMH of cerebral palsy from group home, seizure disorder, spastic paraplegia, s/p PEG tube, BPH, bladder neck stricture s/p suprapubic cath hx of ESBL -proteus and pseudomonas , asthma, nephrolithiasis, and chronic constipation presents with fever and wet cough that started earlier today. Per caretaker, pt coughs and is unable to mobilize the phlegm. Gave breathing treatments without relief. Denies headache, CP, abd pain, n/v/d, or numbness/tingling. 64 y/m with PMH of cerebral palsy from group home, seizure disorder, spastic paraplegia, s/p PEG tube, BPH, bladder neck stricture s/p suprapubic cath, history of ESBL, asthma, nephrolithiasis presented with fever and cough that started earlier today. he denies any chest pain, no belly pain, change in bladder or bowel habits, tingling or numbness. In ED he was tachypneic and short of breath. CXR showed possible left lung pneumonia, also had a fever in ED. 64 y/m with PMH of cerebral palsy from group home, seizure disorder, spastic paraplegia, s/p PEG tube, BPH, bladder neck stricture s/p suprapubic cath, history of ESBL, asthma, nephrolithiasis presented with fever and dry cough for 1 day. He denies any chest pain, no belly pain, change in bladder or bowel habits, tingling or numbness. In ED he was tachypneic and short of breath. CXR showed possible left lung pneumonia, also had a fever in ED. 64 y/m with PMH of cerebral palsy from group home, seizure disorder, spastic paraplegia, s/p PEG tube, BPH, bladder neck stricture s/p suprapubic cath, history of ESBL, asthma, from group home presented with fever and dry cough for 1 day. He denies any chest pain, no belly pain, change in bladder or bowel habits, tingling or numbness. In ED he was tachypneic and short of breath. CXR showed possible left lung pneumonia, also had a fever in ED.

## 2020-03-20 NOTE — H&P ADULT - NSHPPHYSICALEXAM_GEN_ALL_CORE
T(C): 39.1 (03-20-20 @ 19:10), Max: 39.1 (03-20-20 @ 19:10)  HR: 94 (03-20-20 @ 16:34) (94 - 94)  BP: 106/70 (03-20-20 @ 16:34) (106/70 - 106/70)  RR: 20 (03-20-20 @ 16:34) (20 - 20)  SpO2: 96% (03-20-20 @ 16:34) (96% - 96%)  PHYSICAL EXAM:    Constitutional: Lying in bed no acute distress    Eyes: PERLAA    ENMT: No facial deviation    Neck: No mass/ JVD    Respiratory: B/l clear, no crackles, wheeze    Cardiovascular: Regular rate and rhythm, no murmur    Gastrointestinal: soft non tender, no organomegaly    Extremities: No edema, B/l lower ext    Neurological: AO x 3, non focal T(C): 39.1 (03-20-20 @ 19:10), Max: 39.1 (03-20-20 @ 19:10)  HR: 94 (03-20-20 @ 16:34) (94 - 94)  BP: 106/70 (03-20-20 @ 16:34) (106/70 - 106/70)  RR: 20 (03-20-20 @ 16:34) (20 - 20)  SpO2: 96% (03-20-20 @ 16:34) (96% - 96%)  PHYSICAL EXAM:    Constitutional: Lying in bed no acute distress    Eyes: PERLAA    ENMT: No facial deviation    Neck: No mass/ JVD    Respiratory: Minimal crackles    Cardiovascular: Regular rate and rhythm, no murmur    Gastrointestinal: soft non tender, no organomegaly    Extremities: No edema, B/l lower ext    Neurological: AO x 2, no focal deficits

## 2020-03-20 NOTE — ED PROVIDER NOTE - NS ED ROS FT
Eyes:  No visual changes, eye pain or discharge.  ENMT:  No hearing changes, pain, no sore throat or runny nose, no difficulty swallowing  Cardiac:  No chest pain, SOB or edema. No chest pain with exertion.  Respiratory:  No respiratory distress. No hemoptysis. No history of asthma or RAD. + cough  GI:  No nausea, vomiting, diarrhea or abdominal pain.  :  No dysuria, frequency or burning.  MS:  No myalgia, muscle weakness, joint pain or back pain.  Neuro:  No headache or weakness.  No LOC.  Skin:  No skin rash.   Endocrine: No history of thyroid disease or diabetes. Eyes:  No visual changes, eye pain or discharge.  ENMT:  No hearing changes, pain, no sore throat or runny nose, no difficulty swallowing  Cardiac:  No chest pain, SOB or edema. No chest pain with exertion.  Respiratory:  No respiratory distress. No hemoptysis. + history of asthma + cough  GI:  No nausea, vomiting, diarrhea or abdominal pain.  :  No dysuria, frequency or burning.  MS:  No myalgia, muscle weakness, joint pain or back pain.  Neuro:  No headache or weakness.  No LOC.  Skin:  No skin rash.   Endocrine: No history of thyroid disease or diabetes.

## 2020-03-20 NOTE — PATIENT PROFILE ADULT - JOB HELP
Health Maintenance Summary     Topic Due On Due Status Completed On    IMMUNIZATION - IPV  Completed Nov 22, 2011    IMMUNIZATION - MMR  Completed Nov 22, 2011    IMMUNIZATION - VARICELLA  Completed Nov 22, 2011    IMMUNIZATION - HEPATITIS B  Completed Apr 21, 2010    IMMUNIZATION - HEPATITIS A  Completed Apr 21, 2010    IMMUNIZATION - MENINGITIS Oct 22, 2018 Not Due     IMMUNIZATION - HPV  Oct 22, 2018 Not Due     IMMUNIZATION - DTaP/Tdap/Td Oct 22, 2018 Not Due Nov 22, 2011    Immunization-Influenza Sep 1, 2017 Not Due Nov 22, 2011          Patient is up to date, no discussion needed .     no

## 2020-03-20 NOTE — ED PROVIDER NOTE - CLINICAL SUMMARY MEDICAL DECISION MAKING FREE TEXT BOX
65 yo M presented to Ed for cough and was found to have respiratory distress due to pneumonia vs viral illness. Patient was found to be febrile tachycardic and in respiratory distress and almost required intubation. Patient admitted to ICU for further management. He requires ICU admission due to high risk of decompensation.

## 2020-03-20 NOTE — ED ADULT TRIAGE NOTE - CHIEF COMPLAINT QUOTE
cough that comes and goes. pt sent in from group home. given neb at 3pm. pt has productive cough but cannot get sputum up as per note. pt has hx of CP

## 2020-03-21 LAB
ALBUMIN SERPL ELPH-MCNC: 3.9 G/DL — SIGNIFICANT CHANGE UP (ref 3.5–5.2)
ALP SERPL-CCNC: 80 U/L — SIGNIFICANT CHANGE UP (ref 30–115)
ALT FLD-CCNC: 15 U/L — SIGNIFICANT CHANGE UP (ref 0–41)
ANION GAP SERPL CALC-SCNC: 13 MMOL/L — SIGNIFICANT CHANGE UP (ref 7–14)
AST SERPL-CCNC: 22 U/L — SIGNIFICANT CHANGE UP (ref 0–41)
BASOPHILS # BLD AUTO: 0.04 K/UL — SIGNIFICANT CHANGE UP (ref 0–0.2)
BASOPHILS NFR BLD AUTO: 0.4 % — SIGNIFICANT CHANGE UP (ref 0–1)
BILIRUB SERPL-MCNC: 0.4 MG/DL — SIGNIFICANT CHANGE UP (ref 0.2–1.2)
BUN SERPL-MCNC: 14 MG/DL — SIGNIFICANT CHANGE UP (ref 10–20)
C DIFF BY PCR RESULT: NEGATIVE — SIGNIFICANT CHANGE UP
C DIFF TOX GENS STL QL NAA+PROBE: SIGNIFICANT CHANGE UP
CALCIUM SERPL-MCNC: 8.8 MG/DL — SIGNIFICANT CHANGE UP (ref 8.5–10.1)
CHLORIDE SERPL-SCNC: 105 MMOL/L — SIGNIFICANT CHANGE UP (ref 98–110)
CO2 SERPL-SCNC: 25 MMOL/L — SIGNIFICANT CHANGE UP (ref 17–32)
CREAT SERPL-MCNC: 0.5 MG/DL — LOW (ref 0.7–1.5)
D DIMER BLD IA.RAPID-MCNC: 292 NG/ML DDU — HIGH (ref 0–230)
EOSINOPHIL # BLD AUTO: 0.04 K/UL — SIGNIFICANT CHANGE UP (ref 0–0.7)
EOSINOPHIL NFR BLD AUTO: 0.4 % — SIGNIFICANT CHANGE UP (ref 0–8)
ERYTHROCYTE [SEDIMENTATION RATE] IN BLOOD: 62 MM/HR — HIGH (ref 0–10)
FIBRINOGEN PPP-MCNC: >700 MG/DL — HIGH (ref 204.4–570.6)
GLUCOSE SERPL-MCNC: 89 MG/DL — SIGNIFICANT CHANGE UP (ref 70–99)
HCT VFR BLD CALC: 38.7 % — LOW (ref 42–52)
HGB BLD-MCNC: 13.5 G/DL — LOW (ref 14–18)
IMM GRANULOCYTES NFR BLD AUTO: 0.3 % — SIGNIFICANT CHANGE UP (ref 0.1–0.3)
LACTATE SERPL-SCNC: 1.4 MMOL/L — SIGNIFICANT CHANGE UP (ref 0.7–2)
LYMPHOCYTES # BLD AUTO: 0.73 K/UL — LOW (ref 1.2–3.4)
LYMPHOCYTES # BLD AUTO: 7.7 % — LOW (ref 20.5–51.1)
MCHC RBC-ENTMCNC: 33.8 PG — HIGH (ref 27–31)
MCHC RBC-ENTMCNC: 34.9 G/DL — SIGNIFICANT CHANGE UP (ref 32–37)
MCV RBC AUTO: 97 FL — HIGH (ref 80–94)
MONOCYTES # BLD AUTO: 0.8 K/UL — HIGH (ref 0.1–0.6)
MONOCYTES NFR BLD AUTO: 8.4 % — SIGNIFICANT CHANGE UP (ref 1.7–9.3)
MRSA PCR RESULT.: NEGATIVE — SIGNIFICANT CHANGE UP
NEUTROPHILS # BLD AUTO: 7.85 K/UL — HIGH (ref 1.4–6.5)
NEUTROPHILS NFR BLD AUTO: 82.8 % — HIGH (ref 42.2–75.2)
NRBC # BLD: 0 /100 WBCS — SIGNIFICANT CHANGE UP (ref 0–0)
PLATELET # BLD AUTO: 152 K/UL — SIGNIFICANT CHANGE UP (ref 130–400)
POTASSIUM SERPL-MCNC: 4.3 MMOL/L — SIGNIFICANT CHANGE UP (ref 3.5–5)
POTASSIUM SERPL-SCNC: 4.3 MMOL/L — SIGNIFICANT CHANGE UP (ref 3.5–5)
PROT SERPL-MCNC: 6.8 G/DL — SIGNIFICANT CHANGE UP (ref 6–8)
RBC # BLD: 3.99 M/UL — LOW (ref 4.7–6.1)
RBC # FLD: 13.2 % — SIGNIFICANT CHANGE UP (ref 11.5–14.5)
SODIUM SERPL-SCNC: 143 MMOL/L — SIGNIFICANT CHANGE UP (ref 135–146)
WBC # BLD: 9.49 K/UL — SIGNIFICANT CHANGE UP (ref 4.8–10.8)
WBC # FLD AUTO: 9.49 K/UL — SIGNIFICANT CHANGE UP (ref 4.8–10.8)

## 2020-03-21 PROCEDURE — 71045 X-RAY EXAM CHEST 1 VIEW: CPT | Mod: 26

## 2020-03-21 PROCEDURE — 99233 SBSQ HOSP IP/OBS HIGH 50: CPT

## 2020-03-21 RX ORDER — VANCOMYCIN HCL 1 G
1000 VIAL (EA) INTRAVENOUS ONCE
Refills: 0 | Status: COMPLETED | OUTPATIENT
Start: 2020-03-21 | End: 2020-03-21

## 2020-03-21 RX ORDER — SODIUM CHLORIDE 9 MG/ML
500 INJECTION, SOLUTION INTRAVENOUS ONCE
Refills: 0 | Status: COMPLETED | OUTPATIENT
Start: 2020-03-21 | End: 2020-03-21

## 2020-03-21 RX ORDER — ACETAMINOPHEN 500 MG
650 TABLET ORAL EVERY 6 HOURS
Refills: 0 | Status: DISCONTINUED | OUTPATIENT
Start: 2020-03-21 | End: 2020-03-27

## 2020-03-21 RX ORDER — PHENOBARBITAL 60 MG
64.8 TABLET ORAL THREE TIMES A DAY
Refills: 0 | Status: DISCONTINUED | OUTPATIENT
Start: 2020-03-21 | End: 2020-03-27

## 2020-03-21 RX ORDER — CHLORHEXIDINE GLUCONATE 213 G/1000ML
1 SOLUTION TOPICAL
Refills: 0 | Status: DISCONTINUED | OUTPATIENT
Start: 2020-03-21 | End: 2020-03-27

## 2020-03-21 RX ORDER — PANTOPRAZOLE SODIUM 20 MG/1
40 TABLET, DELAYED RELEASE ORAL
Refills: 0 | Status: DISCONTINUED | OUTPATIENT
Start: 2020-03-21 | End: 2020-03-21

## 2020-03-21 RX ORDER — VANCOMYCIN HCL 1 G
750 VIAL (EA) INTRAVENOUS EVERY 12 HOURS
Refills: 0 | Status: DISCONTINUED | OUTPATIENT
Start: 2020-03-21 | End: 2020-03-21

## 2020-03-21 RX ORDER — SODIUM CHLORIDE 9 MG/ML
250 INJECTION INTRAMUSCULAR; INTRAVENOUS; SUBCUTANEOUS ONCE
Refills: 0 | Status: COMPLETED | OUTPATIENT
Start: 2020-03-21 | End: 2020-03-21

## 2020-03-21 RX ORDER — BACLOFEN 100 %
10 POWDER (GRAM) MISCELLANEOUS THREE TIMES A DAY
Refills: 0 | Status: DISCONTINUED | OUTPATIENT
Start: 2020-03-21 | End: 2020-03-27

## 2020-03-21 RX ORDER — PANTOPRAZOLE SODIUM 20 MG/1
40 TABLET, DELAYED RELEASE ORAL
Refills: 0 | Status: DISCONTINUED | OUTPATIENT
Start: 2020-03-21 | End: 2020-03-27

## 2020-03-21 RX ORDER — VANCOMYCIN HCL 1 G
VIAL (EA) INTRAVENOUS
Refills: 0 | Status: DISCONTINUED | OUTPATIENT
Start: 2020-03-21 | End: 2020-03-21

## 2020-03-21 RX ORDER — ACETAMINOPHEN 500 MG
650 TABLET ORAL EVERY 6 HOURS
Refills: 0 | Status: DISCONTINUED | OUTPATIENT
Start: 2020-03-21 | End: 2020-03-21

## 2020-03-21 RX ADMIN — MEROPENEM 100 MILLIGRAM(S): 1 INJECTION INTRAVENOUS at 06:01

## 2020-03-21 RX ADMIN — Medication 650 MILLIGRAM(S): at 01:39

## 2020-03-21 RX ADMIN — PANTOPRAZOLE SODIUM 40 MILLIGRAM(S): 20 TABLET, DELAYED RELEASE ORAL at 06:29

## 2020-03-21 RX ADMIN — Medication 250 MILLIGRAM(S): at 01:52

## 2020-03-21 RX ADMIN — ENOXAPARIN SODIUM 40 MILLIGRAM(S): 100 INJECTION SUBCUTANEOUS at 12:56

## 2020-03-21 RX ADMIN — Medication 64.8 MILLIGRAM(S): at 14:27

## 2020-03-21 RX ADMIN — Medication 64.8 MILLIGRAM(S): at 21:25

## 2020-03-21 RX ADMIN — Medication 10 MILLIGRAM(S): at 14:27

## 2020-03-21 RX ADMIN — Medication 10 MILLIGRAM(S): at 21:26

## 2020-03-21 RX ADMIN — SODIUM CHLORIDE 1000 MILLILITER(S): 9 INJECTION INTRAMUSCULAR; INTRAVENOUS; SUBCUTANEOUS at 14:28

## 2020-03-21 RX ADMIN — Medication 650 MILLIGRAM(S): at 01:11

## 2020-03-21 RX ADMIN — SODIUM CHLORIDE 1000 MILLILITER(S): 9 INJECTION, SOLUTION INTRAVENOUS at 07:21

## 2020-03-21 RX ADMIN — Medication 64.8 MILLIGRAM(S): at 06:01

## 2020-03-21 RX ADMIN — CHLORHEXIDINE GLUCONATE 1 APPLICATION(S): 213 SOLUTION TOPICAL at 06:01

## 2020-03-21 RX ADMIN — Medication 10 MILLIGRAM(S): at 06:01

## 2020-03-21 NOTE — CONSULT NOTE ADULT - SUBJECTIVE AND OBJECTIVE BOX
HAWATISH SCHUSTER  64y, Male  Allergy: No Known Allergies      All historical available data reviewed.    HPI:  64 y/m with PMH of cerebral palsy from group home, seizure disorder, spastic paraplegia, s/p PEG tube, BPH, bladder neck stricture s/p suprapubic cath, history of ESBL, asthma, from group home presented with fever and dry cough for 1 day. He denies any chest pain, no belly pain, change in bladder or bowel habits, tingling or numbness. In ED he was tachypneic and short of breath. CXR showed possible left lung pneumonia, also had a fever in ED. (20 Mar 2020 21:25)    FAMILY HISTORY:  Family history unknown    PAST MEDICAL & SURGICAL HISTORY:  Asthma  Urinary retention  Urinary calculi  Spastic quadriplegia  Osteoporosis  Seizure: last seizure &gt;10 years ago  Cerebral palsy  BPH (benign prostatic hyperplasia)  Suprapubic catheter  H/O cystoscopy  S/P percutaneous endoscopic gastrostomy (PEG) tube placement        VITALS:  T(F): 98.9, Max: 102.4 (20 @ 19:10)  HR: 86  BP: 114/65  RR: 20Vital Signs Last 24 Hrs  T(C): 37.2 (21 Mar 2020 04:00), Max: 39.1 (20 Mar 2020 19:10)  T(F): 98.9 (21 Mar 2020 04:00), Max: 102.4 (20 Mar 2020 19:10)  HR: 86 (21 Mar 2020 05:00) (66 - 96)  BP: 114/65 (21 Mar 2020 05:00) (87/58 - 121/60)  BP(mean): 91 (21 Mar 2020 05:00) (66 - 96)  RR: 20 (21 Mar 2020 05:00) (17 - 28)  SpO2: 97% (21 Mar 2020 05:00) (93% - 99%)    TESTS & MEASUREMENTS:                        14.5   9.75  )-----------( 179      ( 20 Mar 2020 19:00 )             42.2     03-    141  |  103  |  22<H>  ----------------------------<  124<H>  4.1   |  24  |  0.7    Ca    9.5      20 Mar 2020 19:00    TPro  7.6  /  Alb  4.2  /  TBili  0.3  /  DBili  x   /  AST  29  /  ALT  17  /  AlkPhos  98  03-20    LIVER FUNCTIONS - ( 20 Mar 2020 19:00 )  Alb: 4.2 g/dL / Pro: 7.6 g/dL / ALK PHOS: 98 U/L / ALT: 17 U/L / AST: 29 U/L / GGT: x             Urinalysis Basic - ( 20 Mar 2020 20:18 )    Color: Yellow / Appearance: Slightly Turbid / S.018 / pH: x  Gluc: x / Ketone: Negative  / Bili: Negative / Urobili: <2 mg/dL   Blood: x / Protein: 30 mg/dL / Nitrite: Positive   Leuk Esterase: Large / RBC: 4 /HPF / WBC 48 /HPF   Sq Epi: x / Non Sq Epi: 1 /HPF / Bacteria: Many          RADIOLOGY & ADDITIONAL TESTS:  Personal review of radiological diagnostics performed  Echo and EKG results noted when applicable.     MEDICATIONS:  acetaminophen   Tablet .. 650 milliGRAM(s) Oral every 6 hours PRN  baclofen 10 milliGRAM(s) Oral three times a day  chlorhexidine 4% Liquid 1 Application(s) Topical <User Schedule>  enoxaparin Injectable 40 milliGRAM(s) SubCutaneous daily  hydroxychloroquine 400 milliGRAM(s) Oral two times a day  meropenem  IVPB 1000 milliGRAM(s) IV Intermittent every 8 hours  pantoprazole   Suspension 40 milliGRAM(s) Oral before breakfast  PHENobarbital 64.8 milliGRAM(s) Oral three times a day  vancomycin  IVPB      vancomycin  IVPB 750 milliGRAM(s) IV Intermittent every 12 hours      ANTIBIOTICS:  hydroxychloroquine 400 milliGRAM(s) Oral two times a day  meropenem  IVPB 1000 milliGRAM(s) IV Intermittent every 8 hours  vancomycin  IVPB      vancomycin  IVPB 750 milliGRAM(s) IV Intermittent every 12 hours

## 2020-03-21 NOTE — CONSULT NOTE ADULT - ASSESSMENT
64 y/m with PMH of cerebral palsy from group home, seizure disorder, spastic paraplegia, s/p PEG tube, BPH, bladder neck stricture s/p suprapubic cath, history of ESBL, asthma, from group home presented with fever and dry cough for 1 day. He denies any chest pain, no belly pain, change in bladder or bowel habits, tingling or numbness. In ED he was tachypneic and short of breath    IMPRESSION:  #COVID19 :doubt but must r/o given that he is from a   -no bacterial PNA  -no sepsis  -SIRS secondary to aspiration with fevers  -influenza/RSV NG    RECOMMENDATIONS:  -d/c ABx  -f/u COVID19  -BCx  -UA and if pyuria f/u UCx

## 2020-03-21 NOTE — PROGRESS NOTE ADULT - SUBJECTIVE AND OBJECTIVE BOX
TISH SHAIKH 64y Male  MRN#: [id number]   Hospital Day: 1d    SUBJECTIVE  Patient is a 64y old Male who presents with a chief complaint of Shortness of breath/ Cough (21 Mar 2020 13:34)  Currently admitted to medicine with the primary diagnosis of Respiratory distress    INTERVAL HPI AND OVERNIGHT EVENTS:  Patient was examined and seen at bedside. This morning he is resting comfortably in bed and reports no issues or overnight events.    REVIEW OF SYMPTOMS:  Unable to obtain due to patient's mental status     OBJECTIVE  PAST MEDICAL & SURGICAL HISTORY  Asthma  Urinary retention  Urinary calculi  Spastic quadriplegia  Osteoporosis  Seizure: last seizure &gt;10 years ago  Cerebral palsy  BPH (benign prostatic hyperplasia)  Suprapubic catheter  H/O cystoscopy  S/P percutaneous endoscopic gastrostomy (PEG) tube placement    ALLERGIES:  No Known Allergies    MEDICATIONS:  STANDING MEDICATIONS  baclofen 10 milliGRAM(s) Oral three times a day  chlorhexidine 4% Liquid 1 Application(s) Topical <User Schedule>  enoxaparin Injectable 40 milliGRAM(s) SubCutaneous daily  pantoprazole   Suspension 40 milliGRAM(s) Oral before breakfast  PHENobarbital 64.8 milliGRAM(s) Oral three times a day    PRN MEDICATIONS  acetaminophen   Tablet .. 650 milliGRAM(s) Oral every 6 hours PRN      VITAL SIGNS: Last 24 Hours  T(C): 36.9 (21 Mar 2020 12:00), Max: 39.1 (20 Mar 2020 19:10)  T(F): 98.5 (21 Mar 2020 12:00), Max: 102.4 (20 Mar 2020 19:10)  HR: 80 (21 Mar 2020 14:00) (62 - 96)  BP: 110/62 (21 Mar 2020 14:00) (67/53 - 121/60)  BP(mean): 90 (21 Mar 2020 14:00) (56 - 96)  RR: 20 (21 Mar 2020 14:00) (16 - 28)  SpO2: 98% (21 Mar 2020 14:00) (91% - 99%)    LABS:                        14.5   9.75  )-----------( 179      ( 20 Mar 2020 19:00 )             42.2     03-20    141  |  103  |  22<H>  ----------------------------<  124<H>  4.1   |  24  |  0.7    Ca    9.5      20 Mar 2020 19:00    TPro  7.6  /  Alb  4.2  /  TBili  0.3  /  DBili  x   /  AST  29  /  ALT  17  /  AlkPhos  98      PT/INR - ( 20 Mar 2020 19:00 )   PT: 12.30 sec;   INR: 1.07 ratio         PTT - ( 20 Mar 2020 19:00 )  PTT:29.3 sec  Urinalysis Basic - ( 20 Mar 2020 20:18 )    Color: Yellow / Appearance: Slightly Turbid / S.018 / pH: x  Gluc: x / Ketone: Negative  / Bili: Negative / Urobili: <2 mg/dL   Blood: x / Protein: 30 mg/dL / Nitrite: Positive   Leuk Esterase: Large / RBC: 4 /HPF / WBC 48 /HPF   Sq Epi: x / Non Sq Epi: 1 /HPF / Bacteria: Many    Troponin T, Serum: <0.01 ng/mL (20 @ 19:00)      CARDIAC MARKERS ( 20 Mar 2020 19:00 )  x     / <0.01 ng/mL / x     / x     / x          RADIOLOGY:  < from: Xray Chest 1 View- PORTABLE-Routine (20 @ 06:11) >  Impression:      Cardiac megaly, unchanged.    Elevated left hemidiaphragm with bilateral interstitial prominence.    < end of copied text >      PHYSICAL EXAM PER ATTENDING:  CONSTITUTIONAL: Well-developed; well-nourished; in no acute distress  SKIN: warm, dry  HEAD: Normocephalic; atraumatic.  EYES: PERRL, EOM, no conj injection, sclera clear  ENT: No nasal discharge; airway clear.  NECK: Supple; non tender.  No midline ttp ctls  CARD: S1, S2 normal; no murmurs, gallops, or rubs. Regular rate and rhythm. 2+ RPs and DPs bilat, no carotid bruits, no pedal   edema, no calf pain b/l  RESP: CTA  bilat good air movement No wheezes, rales or rhonchi.  ABD: Soft, not tender, not distended, no CVA ttp no rebound or guarding, bowel sounds present  EXT: contracted  PEG  NEURO: awake    ASSESSMENT & PLAN  Patient is a 63yo male with PMH of cerebral palsy, seizure disorder, spastic paraplegia, s/p PEG tube, BPH with bladder neck stricture s/p suprapubic catheter, and history of ESBL Proteus mirabilis who presented from a group     64 y/m with PMH of cerebral palsy from group home, seizure disorder, spastic paraplegia, s/p PEG tube, BPH, bladder neck stricture s/p suprapubic cath, history of ESBL, asthma, from group home presented with fever and dry cough for 1 day. He denies any chest pain, no belly pain, change in bladder or bowel habits, tingling or numbness. In ED he was tachypneic and short of breath. CXR showed possible left lung pneumonia, also had a fever in ED.     PAST MEDICAL & SURGICAL HISTORY:  Asthma  Urinary retention  Urinary calculi  Spastic quadriplegia  Osteoporosis  Seizure: last seizure &gt;10 years ago  Cerebral palsy  BPH (benign prostatic hyperplasia)  Suprapubic catheter  H/O cystoscopy  S/P percutaneous endoscopic gastrostomy (PEG) tube placement      #Misc  - DVT Prophylaxis:  - GI Prophylaxis:  - Diet:  - Activity:  - IV Fluids:  - Code Status:    Dispo: TISH SHAIKH 64y Male  MRN#: [id number]   Hospital Day: 1d    SUBJECTIVE  Patient is a 64y old Male who presents with a chief complaint of Shortness of breath/ Cough (21 Mar 2020 13:34)  Currently admitted to medicine with the primary diagnosis of Respiratory distress    INTERVAL HPI AND OVERNIGHT EVENTS:  Patient was examined and seen at bedside. This morning he is resting comfortably in bed and reports no issues or overnight events.    REVIEW OF SYMPTOMS:  Unable to obtain due to patient's mental status     OBJECTIVE  PAST MEDICAL & SURGICAL HISTORY  Asthma  Urinary retention  Urinary calculi  Spastic quadriplegia  Osteoporosis  Seizure: last seizure &gt;10 years ago  Cerebral palsy  BPH (benign prostatic hyperplasia)  Suprapubic catheter  H/O cystoscopy  S/P percutaneous endoscopic gastrostomy (PEG) tube placement    ALLERGIES:  No Known Allergies    MEDICATIONS:  STANDING MEDICATIONS  baclofen 10 milliGRAM(s) Oral three times a day  chlorhexidine 4% Liquid 1 Application(s) Topical <User Schedule>  enoxaparin Injectable 40 milliGRAM(s) SubCutaneous daily  pantoprazole   Suspension 40 milliGRAM(s) Oral before breakfast  PHENobarbital 64.8 milliGRAM(s) Oral three times a day    PRN MEDICATIONS  acetaminophen   Tablet .. 650 milliGRAM(s) Oral every 6 hours PRN      VITAL SIGNS: Last 24 Hours  T(C): 36.9 (21 Mar 2020 12:00), Max: 39.1 (20 Mar 2020 19:10)  T(F): 98.5 (21 Mar 2020 12:00), Max: 102.4 (20 Mar 2020 19:10)  HR: 80 (21 Mar 2020 14:00) (62 - 96)  BP: 110/62 (21 Mar 2020 14:00) (67/53 - 121/60)  BP(mean): 90 (21 Mar 2020 14:00) (56 - 96)  RR: 20 (21 Mar 2020 14:00) (16 - 28)  SpO2: 98% (21 Mar 2020 14:00) (91% - 99%)    LABS:                        14.5   9.75  )-----------( 179      ( 20 Mar 2020 19:00 )             42.2     03-20    141  |  103  |  22<H>  ----------------------------<  124<H>  4.1   |  24  |  0.7    Ca    9.5      20 Mar 2020 19:00    TPro  7.6  /  Alb  4.2  /  TBili  0.3  /  DBili  x   /  AST  29  /  ALT  17  /  AlkPhos  98      PT/INR - ( 20 Mar 2020 19:00 )   PT: 12.30 sec;   INR: 1.07 ratio         PTT - ( 20 Mar 2020 19:00 )  PTT:29.3 sec  Urinalysis Basic - ( 20 Mar 2020 20:18 )    Color: Yellow / Appearance: Slightly Turbid / S.018 / pH: x  Gluc: x / Ketone: Negative  / Bili: Negative / Urobili: <2 mg/dL   Blood: x / Protein: 30 mg/dL / Nitrite: Positive   Leuk Esterase: Large / RBC: 4 /HPF / WBC 48 /HPF   Sq Epi: x / Non Sq Epi: 1 /HPF / Bacteria: Many    Troponin T, Serum: <0.01 ng/mL (20 @ 19:00)      CARDIAC MARKERS ( 20 Mar 2020 19:00 )  x     / <0.01 ng/mL / x     / x     / x          RADIOLOGY:  < from: Xray Chest 1 View- PORTABLE-Routine (20 @ 06:11) >  Impression:      Cardiac megaly, unchanged.    Elevated left hemidiaphragm with bilateral interstitial prominence.    < end of copied text >      PHYSICAL EXAM PER ATTENDING:  CONSTITUTIONAL: Well-developed; well-nourished; in no acute distress  SKIN: warm, dry  HEAD: Normocephalic; atraumatic.  EYES: PERRL, EOM, no conj injection, sclera clear  ENT: No nasal discharge; airway clear.  NECK: Supple; non tender.  No midline ttp ctls  CARD: S1, S2 normal; no murmurs, gallops, or rubs. Regular rate and rhythm. 2+ RPs and DPs bilat, no carotid bruits, no pedal   edema, no calf pain b/l  RESP: CTA  bilat good air movement No wheezes, rales or rhonchi.  ABD: Soft, not tender, not distended, no CVA ttp no rebound or guarding, bowel sounds present  EXT: contracted  PEG  NEURO: awake    ASSESSMENT & PLAN  Patient is a 65yo male with PMH of cerebral palsy, seizure disorder, spastic paraplegia, s/p PEG tube, BPH with bladder neck stricture s/p suprapubic catheter, and history of ESBL Proteus mirabilis who presented from a group with fever and dry cough of 1 days' duration. Chest X-ray on admission showed possible left lung pneumonia. In the ED, the patient's temperature was 101F.     #Fever with   #Rule out COVID-19  - ID consult appreciated  - Patient currently saturating at 97% on 3L  - Fever resolved without     #Asthma    #BPH with bladder neck stricture s/p suprapubic catheter    PAST MEDICAL & SURGICAL HISTORY:  Asthma  Urinary retention  Urinary calculi  Spastic quadriplegia  Osteoporosis  Seizure: last seizure &gt;10 years ago  Cerebral palsy  BPH (benign prostatic hyperplasia)  Suprapubic catheter  H/O cystoscopy  S/P percutaneous endoscopic gastrostomy (PEG) tube placement      #Misc  - DVT Prophylaxis:  - GI Prophylaxis:  - Diet:  - Activity:  - IV Fluids:  - Code Status:    Dispo: TISH SHAIKH 64y Male  MRN#: [id number]   Hospital Day: 1d    SUBJECTIVE  Patient is a 64y old Male who presents with a chief complaint of Shortness of breath/ Cough (21 Mar 2020 13:34)  Currently admitted to medicine with the primary diagnosis of Respiratory distress    INTERVAL HPI AND OVERNIGHT EVENTS:  Patient was examined and seen at bedside. This morning he is resting comfortably in bed and reports no issues or overnight events.    REVIEW OF SYMPTOMS:  Unable to obtain due to patient's mental status     OBJECTIVE  PAST MEDICAL & SURGICAL HISTORY  Asthma  Urinary retention  Urinary calculi  Spastic quadriplegia  Osteoporosis  Seizure: last seizure &gt;10 years ago  Cerebral palsy  BPH (benign prostatic hyperplasia)  Suprapubic catheter  H/O cystoscopy  S/P percutaneous endoscopic gastrostomy (PEG) tube placement    ALLERGIES:  No Known Allergies    MEDICATIONS:  STANDING MEDICATIONS  baclofen 10 milliGRAM(s) Oral three times a day  chlorhexidine 4% Liquid 1 Application(s) Topical <User Schedule>  enoxaparin Injectable 40 milliGRAM(s) SubCutaneous daily  pantoprazole   Suspension 40 milliGRAM(s) Oral before breakfast  PHENobarbital 64.8 milliGRAM(s) Oral three times a day    PRN MEDICATIONS  acetaminophen   Tablet .. 650 milliGRAM(s) Oral every 6 hours PRN      VITAL SIGNS: Last 24 Hours  T(C): 36.9 (21 Mar 2020 12:00), Max: 39.1 (20 Mar 2020 19:10)  T(F): 98.5 (21 Mar 2020 12:00), Max: 102.4 (20 Mar 2020 19:10)  HR: 80 (21 Mar 2020 14:00) (62 - 96)  BP: 110/62 (21 Mar 2020 14:00) (67/53 - 121/60)  BP(mean): 90 (21 Mar 2020 14:00) (56 - 96)  RR: 20 (21 Mar 2020 14:00) (16 - 28)  SpO2: 98% (21 Mar 2020 14:00) (91% - 99%)    LABS:                        14.5   9.75  )-----------( 179      ( 20 Mar 2020 19:00 )             42.2     03-20    141  |  103  |  22<H>  ----------------------------<  124<H>  4.1   |  24  |  0.7    Ca    9.5      20 Mar 2020 19:00    TPro  7.6  /  Alb  4.2  /  TBili  0.3  /  DBili  x   /  AST  29  /  ALT  17  /  AlkPhos  98      PT/INR - ( 20 Mar 2020 19:00 )   PT: 12.30 sec;   INR: 1.07 ratio         PTT - ( 20 Mar 2020 19:00 )  PTT:29.3 sec  Urinalysis Basic - ( 20 Mar 2020 20:18 )    Color: Yellow / Appearance: Slightly Turbid / S.018 / pH: x  Gluc: x / Ketone: Negative  / Bili: Negative / Urobili: <2 mg/dL   Blood: x / Protein: 30 mg/dL / Nitrite: Positive   Leuk Esterase: Large / RBC: 4 /HPF / WBC 48 /HPF   Sq Epi: x / Non Sq Epi: 1 /HPF / Bacteria: Many    Troponin T, Serum: <0.01 ng/mL (20 @ 19:00)      CARDIAC MARKERS ( 20 Mar 2020 19:00 )  x     / <0.01 ng/mL / x     / x     / x          RADIOLOGY:  < from: Xray Chest 1 View- PORTABLE-Routine (20 @ 06:11) >  Impression:      Cardiac megaly, unchanged.    Elevated left hemidiaphragm with bilateral interstitial prominence.    < end of copied text >      PHYSICAL EXAM PER ATTENDING:  CONSTITUTIONAL: Well-developed; well-nourished; in no acute distress  SKIN: warm, dry  HEAD: Normocephalic; atraumatic.  EYES: PERRL, EOM, no conj injection, sclera clear  ENT: No nasal discharge; airway clear.  NECK: Supple; non tender.  No midline ttp ctls  CARD: S1, S2 normal; no murmurs, gallops, or rubs. Regular rate and rhythm. 2+ RPs and DPs bilat, no carotid bruits, no pedal   edema, no calf pain b/l  RESP: CTA  bilat good air movement No wheezes, rales or rhonchi.  ABD: Soft, not tender, not distended, no CVA ttp no rebound or guarding, bowel sounds present  EXT: contracted  PEG  NEURO: awake    ASSESSMENT & PLAN  Patient is a 63yo male with PMH of cerebral palsy, seizure disorder, spastic paraplegia, s/p PEG tube, BPH with bladder neck stricture s/p suprapubic catheter, and history of ESBL Proteus mirabilis who presented from a group with fever and dry cough of 1 days' duration. Chest X-ray on admission showed possible left lung pneumonia. In the ED, the patient's temperature was 101F. Due to history of group home with fever, COVID-19 testing was sent.    #Fever with opacities on chest X-ray  #Rule out COVID-19  - No sepsis on admission (Tmax 102.4F, no leukocytosis, no tachycardia, no tachypnea)  - Lymphopenia is chronic based on previous CBCs but not this severe  - ID consult appreciated  - Fevers likely secondary to aspiration  - Patient currently saturating at 97% on 3L  - Fever resolved without recurrence this morning  - Follow urine culture and blood cultures  - Continue with acetaminophen 650mg PO Q6H PRN fever  - Will monitor off antibiotics  - Follow echocardiogram    #Seizure disorder  - Continue with phenobarbital 64.8mg PO TID    #Spastic paraplegia  #Cerebral palsy  - Continue with baclofen 10mg PO TID    #S/p PEG tube  - Start tube feedings    #Asthma  - Currently saturating 97% on 3L    #BPH with bladder neck stricture s/p suprapubic catheter  - UA is grossly positive but likely due to chronic suprapubic catheter  - Follow urine culture    #Misc  - DVT Prophylaxis: Lovenox 40mg SQ QD   - GI Prophylaxis: pantoprazole 40mg PO QD   - Diet: NPO with tube feedings  - Activity: bedrest  - IV Fluids: not indicated  - Code Status: Full Code    Dispo: MICU monitoring for now, pending COVID-19 PCR TISH SHAIKH 64y Male  MRN#: [id number]   Hospital Day: 1d    SUBJECTIVE  Patient is a 64y old Male who presents with a chief complaint of Shortness of breath/ Cough (21 Mar 2020 13:34)  Currently admitted to medicine with the primary diagnosis of Respiratory distress    INTERVAL HPI AND OVERNIGHT EVENTS:  Patient was examined and seen at bedside. This morning he is resting comfortably in bed and reports no issues or overnight events.    REVIEW OF SYMPTOMS:  Unable to obtain due to patient's mental status     OBJECTIVE  PAST MEDICAL & SURGICAL HISTORY  Asthma  Urinary retention  Urinary calculi  Spastic quadriplegia  Osteoporosis  Seizure: last seizure &gt;10 years ago  Cerebral palsy  BPH (benign prostatic hyperplasia)  Suprapubic catheter  H/O cystoscopy  S/P percutaneous endoscopic gastrostomy (PEG) tube placement    ALLERGIES:  No Known Allergies    MEDICATIONS:  STANDING MEDICATIONS  baclofen 10 milliGRAM(s) Oral three times a day  chlorhexidine 4% Liquid 1 Application(s) Topical <User Schedule>  enoxaparin Injectable 40 milliGRAM(s) SubCutaneous daily  pantoprazole   Suspension 40 milliGRAM(s) Oral before breakfast  PHENobarbital 64.8 milliGRAM(s) Oral three times a day    PRN MEDICATIONS  acetaminophen   Tablet .. 650 milliGRAM(s) Oral every 6 hours PRN      VITAL SIGNS: Last 24 Hours  T(C): 36.9 (21 Mar 2020 12:00), Max: 39.1 (20 Mar 2020 19:10)  T(F): 98.5 (21 Mar 2020 12:00), Max: 102.4 (20 Mar 2020 19:10)  HR: 80 (21 Mar 2020 14:00) (62 - 96)  BP: 110/62 (21 Mar 2020 14:00) (67/53 - 121/60)  BP(mean): 90 (21 Mar 2020 14:00) (56 - 96)  RR: 20 (21 Mar 2020 14:00) (16 - 28)  SpO2: 98% (21 Mar 2020 14:00) (91% - 99%)    LABS:                        14.5   9.75  )-----------( 179      ( 20 Mar 2020 19:00 )             42.2     03-20    141  |  103  |  22<H>  ----------------------------<  124<H>  4.1   |  24  |  0.7    Ca    9.5      20 Mar 2020 19:00    TPro  7.6  /  Alb  4.2  /  TBili  0.3  /  DBili  x   /  AST  29  /  ALT  17  /  AlkPhos  98      PT/INR - ( 20 Mar 2020 19:00 )   PT: 12.30 sec;   INR: 1.07 ratio         PTT - ( 20 Mar 2020 19:00 )  PTT:29.3 sec  Urinalysis Basic - ( 20 Mar 2020 20:18 )    Color: Yellow / Appearance: Slightly Turbid / S.018 / pH: x  Gluc: x / Ketone: Negative  / Bili: Negative / Urobili: <2 mg/dL   Blood: x / Protein: 30 mg/dL / Nitrite: Positive   Leuk Esterase: Large / RBC: 4 /HPF / WBC 48 /HPF   Sq Epi: x / Non Sq Epi: 1 /HPF / Bacteria: Many    Troponin T, Serum: <0.01 ng/mL (20 @ 19:00)      CARDIAC MARKERS ( 20 Mar 2020 19:00 )  x     / <0.01 ng/mL / x     / x     / x          RADIOLOGY:  < from: Xray Chest 1 View- PORTABLE-Routine (20 @ 06:11) >  Impression:      Cardiac megaly, unchanged.    Elevated left hemidiaphragm with bilateral interstitial prominence.    < end of copied text >      PHYSICAL EXAM PER ATTENDING:  CONSTITUTIONAL: Well-developed; well-nourished; in no acute distress  SKIN: warm, dry  HEAD: Normocephalic; atraumatic.  EYES: PERRL, EOM, no conj injection, sclera clear  ENT: No nasal discharge; airway clear.  NECK: Supple; non tender.  No midline ttp ctls  CARD: S1, S2 normal; no murmurs, gallops, or rubs. Regular rate and rhythm. 2+ RPs and DPs bilat, no carotid bruits, no pedal   edema, no calf pain b/l  RESP: CTA  bilat good air movement No wheezes, rales or rhonchi.  ABD: Soft, not tender, not distended, no CVA ttp no rebound or guarding, bowel sounds present  EXT: contracted  PEG  NEURO: awake    ASSESSMENT & PLAN  Patient is a 63yo male with PMH of cerebral palsy, seizure disorder, spastic paraplegia, s/p PEG tube, BPH with bladder neck stricture s/p suprapubic catheter, and history of ESBL Proteus mirabilis who presented from a group with fever and dry cough of 1 days' duration. Chest X-ray on admission showed possible left lung pneumonia. In the ED, the patient's temperature was 101F. Due to history of group home with fever, COVID-19 testing was sent.    #Fever with opacities on chest X-ray  #Rule out COVID-19  - No sepsis on admission (Tmax 102.4F, no leukocytosis, no tachycardia, no tachypnea)  - Lymphopenia is chronic based on previous CBCs but not this severe  - ID consult, d/c antibiotics  - Fevers likely secondary to aspiration  - Patient currently saturating at 97% on 3L  - Fever resolved without recurrence this morning  - Follow urine culture and blood cultures  - Continue with acetaminophen 650mg PO Q6H PRN fever  - Will monitor off antibiotics  - Follow echocardiogram    #Seizure disorder  - Continue with phenobarbital 64.8mg PO TID    #Spastic paraplegia  #Cerebral palsy  - Continue with baclofen 10mg PO TID    #S/p PEG tube  - Start tube feedings    #Asthma  - Currently saturating 97% on 3L    #BPH with bladder neck stricture s/p suprapubic catheter  - UA is grossly positive but likely due to chronic suprapubic catheter  - Follow urine culture    #Misc  - DVT Prophylaxis: Lovenox 40mg SQ QD   - GI Prophylaxis: pantoprazole 40mg PO QD   - Diet: NPO with tube feedings  - Activity: bedrest  - IV Fluids: not indicated  - Code Status: Full Code    Dispo: MICU monitoring for now, pending COVID-19 PCR

## 2020-03-21 NOTE — PROGRESS NOTE ADULT - SUBJECTIVE AND OBJECTIVE BOX
ICU  Attending Progress Daily Note     21 Mar 2020 13:35    He has history of Asthma  Urinary retention  Urinary calculi  Spastic quadriplegia  Osteoporosis  Seizure  Cerebral palsy  BPH (benign prostatic hyperplasia)    Interval event for past 24 hr:  TISH SHAIKH  64y had no event.   Current Complains:  TISH SHAIKH has no new complains  HPI:  64 y/m with PMH of cerebral palsy from group home, seizure disorder, spastic paraplegia, s/p PEG tube, BPH, bladder neck stricture s/p suprapubic cath, history of ESBL, asthma, from group home presented with fever and dry cough for 1 day. He denies any chest pain, no belly pain, change in bladder or bowel habits, tingling or numbness. In ED he was tachypneic and short of breath. CXR showed possible left lung pneumonia, also had a fever in ED. (20 Mar 2020 21:25)    OBJECTIVE:  ICU Vital Signs Last 24 Hrs  T(C): 36.4 (21 Mar 2020 08:00), Max: 39.1 (20 Mar 2020 19:10)  T(F): 97.5 (21 Mar 2020 08:00), Max: 102.4 (20 Mar 2020 19:10)  HR: 80 (21 Mar 2020 10:30) (62 - 96)  BP: 101/60 (21 Mar 2020 10:30) (67/53 - 121/60)  BP(mean): 76 (21 Mar 2020 10:30) (58 - 96)  ABP: --  ABP(mean): --  RR: 21 (21 Mar 2020 10:30) (16 - 28)  SpO2: 99% (21 Mar 2020 10:30) (91% - 99%)    I&O's Summary    20 Mar 2020 07:  -  21 Mar 2020 07:00  --------------------------------------------------------  IN: 980 mL / OUT: 530 mL / NET: 450 mL    21 Mar 2020 07:01  -  21 Mar 2020 13:35  --------------------------------------------------------  IN: 0 mL / OUT: 245 mL / NET: -245 mL      I&O's Detail    20 Mar 2020 07:01  -  21 Mar 2020 07:00  --------------------------------------------------------  IN:    Enteral Tube Flush: 180 mL    IV PiggyBack: 300 mL    Lactated Ringers IV Bolus: 500 mL  Total IN: 980 mL    OUT:    Indwelling Catheter - Suprapubic: 530 mL  Total OUT: 530 mL    Total NET: 450 mL      21 Mar 2020 07:01  -  21 Mar 2020 13:35  --------------------------------------------------------  IN:  Total IN: 0 mL    OUT:    Indwelling Catheter - Suprapubic: 245 mL  Total OUT: 245 mL    Total NET: -245 mL            CAPILLARY BLOOD GLUCOSE        LABS:                          14.5   9.75  )-----------( 179      ( 20 Mar 2020 19:00 )             42.2     03-20    141  |  103  |  22<H>  ----------------------------<  124<H>  4.1   |  24  |  0.7    Ca    9.5      20 Mar 2020 19:00    TPro  7.6  /  Alb  4.2  /  TBili  0.3  /  DBili  x   /  AST  29  /  ALT  17  /  AlkPhos  98  03-20    PT/INR - ( 20 Mar 2020 19:00 )   PT: 12.30 sec;   INR: 1.07 ratio         PTT - ( 20 Mar 2020 19:00 )  PTT:29.3 sec  Urinalysis Basic - ( 20 Mar 2020 20:18 )    Color: Yellow / Appearance: Slightly Turbid / S.018 / pH: x  Gluc: x / Ketone: Negative  / Bili: Negative / Urobili: <2 mg/dL   Blood: x / Protein: 30 mg/dL / Nitrite: Positive   Leuk Esterase: Large / RBC: 4 /HPF / WBC 48 /HPF   Sq Epi: x / Non Sq Epi: 1 /HPF / Bacteria: Many        Home Medications:  albuterol 2.5 mg/3 mL (0.083%) inhalation solution: 3 milliliter(s) inhaled every 6 hours, As Needed (20 Mar 2020 21:56)  Artificial Tears ophthalmic solution: 1 drop(s) to each affected eye 4 times a day (20 Mar 2020 21:56)  baclofen 10 mg oral tablet: 1 tab(s) by gastrostomy tube 3 times a day (20 Mar 2020 21:56)  docusate sodium 60 mg/15 mL oral syrup: 100 milligram(s) by gastrostomy tube once a day, As Needed for constipation (20 Mar 2020 21:56)  Oysco 500 (1250 mg calcium carbonate) oral tablet: 1 tab(s) by gastrostomy tube 2 times a day (20 Mar 2020 21:56)  PHENobarbital 64.8 mg oral tablet: 1 tab(s) orally once a day via G tube (20 Mar 2020 21:56)  Ventolin HFA 90 mcg/inh inhalation aerosol: 2 puff(s) inhaled 4 times a day, As Needed (20 Mar 2020 21:56)    HOSPITAL MEDICATIONS:  MEDICATIONS  (STANDING):  baclofen 10 milliGRAM(s) Oral three times a day  chlorhexidine 4% Liquid 1 Application(s) Topical <User Schedule>  enoxaparin Injectable 40 milliGRAM(s) SubCutaneous daily  pantoprazole   Suspension 40 milliGRAM(s) Oral before breakfast  PHENobarbital 64.8 milliGRAM(s) Oral three times a day  sodium chloride 0.9% Bolus 250 milliLiter(s) IV Bolus once    MEDICATIONS  (PRN):  acetaminophen   Tablet .. 650 milliGRAM(s) Oral every 6 hours PRN Temp greater or equal to 38C (100.4F), Mild Pain (1 - 3)      REVIEW OF SYSTEMS:  .    [ x ] Unable to assess ROS because cerebral paulsy    PHYSICAL EXAM:          CONSTITUTIONAL: Well-developed; well-nourished; in no acute distress.   	SKIN: warm, dry  	HEAD: Normocephalic; atraumatic.  	EYES: PERRL, EOM, no conj injection, sclera clear  	ENT: No nasal discharge; airway clear.  	NECK: Supple; non tender.  No midline ttp ctls  	CARD: S1, S2 normal; no murmurs, gallops, or rubs. Regular rate and rhythm. 2+ RPs and DPs bilat, no carotid bruits, no pedal   edema, no calf pain b/l  	RESP: CTA  bilat good air movement No wheezes, rales or rhonchi.  	ABD: Soft, not tender, not distended, no CVA ttp no rebound or guarding, bowel sounds present  	EXT: contracted  PEG  	  	NEURO: awake         RADIOLOGY:  xray  < from: Xray Chest 1 View- PORTABLE-Routine (20 @ 06:11) >  Impression:      Cardiac megaly, unchanged.    Elevated left hemidiaphragm with bilateral interstitial prominence.      < end of copied text >    I spent 45 minutes of critical care time examining patient, reviewing vitals, labs, medications, imaging and discussing with the team goals of care to prevent life-threatening in this patient who is at high risk for deterioration or death due to:

## 2020-03-21 NOTE — PROGRESS NOTE ADULT - ASSESSMENT
Assessment and Plans  · Assessment		  64 y/m with PMH of cerebral palsy from group home, seizure disorder, spastic paraplegia, s/p PEG tube, BPH, bladder neck stricture s/p suprapubic cath, history of ESBL, asthma, from group home presented with fever and dry cough for 1 day. He denies any chest pain, no belly pain, change in bladder or bowel habits, tingling or numbness. In ED he was tachypneic and short of breath    IMPRESSION:  #COVID19 : less likly will t r/o due to  he is from a [facility]  -no bacterial PNA  posssible aspiration ?   -no sepsis  -SIRS secondary to aspiration with fevers  -influenza/RSV NG    Plan:  -d/c ABx  as per ID   -f/u COVID19  -BCx  -UA and if pyuria f/u UCx    contiue home meds for zeizurs etc

## 2020-03-21 NOTE — CHART NOTE - NSCHARTNOTEFT_GEN_A_CORE
Spoke with patient's sister and health care proxy, Leslie Chaparro ([phone]) regarding patient's conditions, treatment options, and prognosis.

## 2020-03-22 LAB
ALBUMIN SERPL ELPH-MCNC: 3.8 G/DL — SIGNIFICANT CHANGE UP (ref 3.5–5.2)
ALP SERPL-CCNC: 89 U/L — SIGNIFICANT CHANGE UP (ref 30–115)
ALT FLD-CCNC: 16 U/L — SIGNIFICANT CHANGE UP (ref 0–41)
ANION GAP SERPL CALC-SCNC: 15 MMOL/L — HIGH (ref 7–14)
AST SERPL-CCNC: 26 U/L — SIGNIFICANT CHANGE UP (ref 0–41)
BASOPHILS # BLD AUTO: 0.04 K/UL — SIGNIFICANT CHANGE UP (ref 0–0.2)
BASOPHILS NFR BLD AUTO: 0.5 % — SIGNIFICANT CHANGE UP (ref 0–1)
BILIRUB SERPL-MCNC: 0.4 MG/DL — SIGNIFICANT CHANGE UP (ref 0.2–1.2)
BUN SERPL-MCNC: 14 MG/DL — SIGNIFICANT CHANGE UP (ref 10–20)
CALCIUM SERPL-MCNC: 8.6 MG/DL — SIGNIFICANT CHANGE UP (ref 8.5–10.1)
CHLORIDE SERPL-SCNC: 100 MMOL/L — SIGNIFICANT CHANGE UP (ref 98–110)
CO2 SERPL-SCNC: 24 MMOL/L — SIGNIFICANT CHANGE UP (ref 17–32)
CREAT SERPL-MCNC: 0.6 MG/DL — LOW (ref 0.7–1.5)
CRP SERPL-MCNC: 5.03 MG/DL — HIGH (ref 0–0.4)
CULTURE RESULTS: SIGNIFICANT CHANGE UP
EOSINOPHIL # BLD AUTO: 0.1 K/UL — SIGNIFICANT CHANGE UP (ref 0–0.7)
EOSINOPHIL NFR BLD AUTO: 1.4 % — SIGNIFICANT CHANGE UP (ref 0–8)
FERRITIN SERPL-MCNC: 108 NG/ML — SIGNIFICANT CHANGE UP (ref 30–400)
GLUCOSE SERPL-MCNC: 85 MG/DL — SIGNIFICANT CHANGE UP (ref 70–99)
HCT VFR BLD CALC: 38.5 % — LOW (ref 42–52)
HGB BLD-MCNC: 13.3 G/DL — LOW (ref 14–18)
IMM GRANULOCYTES NFR BLD AUTO: 0.3 % — SIGNIFICANT CHANGE UP (ref 0.1–0.3)
LEGIONELLA AG UR QL: NEGATIVE — SIGNIFICANT CHANGE UP
LYMPHOCYTES # BLD AUTO: 0.67 K/UL — LOW (ref 1.2–3.4)
LYMPHOCYTES # BLD AUTO: 9.1 % — LOW (ref 20.5–51.1)
MAGNESIUM SERPL-MCNC: 1.9 MG/DL — SIGNIFICANT CHANGE UP (ref 1.8–2.4)
MCHC RBC-ENTMCNC: 33.3 PG — HIGH (ref 27–31)
MCHC RBC-ENTMCNC: 34.5 G/DL — SIGNIFICANT CHANGE UP (ref 32–37)
MCV RBC AUTO: 96.3 FL — HIGH (ref 80–94)
MONOCYTES # BLD AUTO: 0.79 K/UL — HIGH (ref 0.1–0.6)
MONOCYTES NFR BLD AUTO: 10.7 % — HIGH (ref 1.7–9.3)
NEUTROPHILS # BLD AUTO: 5.75 K/UL — SIGNIFICANT CHANGE UP (ref 1.4–6.5)
NEUTROPHILS NFR BLD AUTO: 78 % — HIGH (ref 42.2–75.2)
NRBC # BLD: 0 /100 WBCS — SIGNIFICANT CHANGE UP (ref 0–0)
PHOSPHATE SERPL-MCNC: 2.1 MG/DL — SIGNIFICANT CHANGE UP (ref 2.1–4.9)
PLATELET # BLD AUTO: 163 K/UL — SIGNIFICANT CHANGE UP (ref 130–400)
POTASSIUM SERPL-MCNC: 4.1 MMOL/L — SIGNIFICANT CHANGE UP (ref 3.5–5)
POTASSIUM SERPL-SCNC: 4.1 MMOL/L — SIGNIFICANT CHANGE UP (ref 3.5–5)
PROCALCITONIN SERPL-MCNC: 0.1 NG/ML — SIGNIFICANT CHANGE UP (ref 0.02–0.1)
PROT SERPL-MCNC: 6.6 G/DL — SIGNIFICANT CHANGE UP (ref 6–8)
RBC # BLD: 4 M/UL — LOW (ref 4.7–6.1)
RBC # FLD: 13.1 % — SIGNIFICANT CHANGE UP (ref 11.5–14.5)
SARS-COV-2 RNA SPEC QL NAA+PROBE: SIGNIFICANT CHANGE UP
SODIUM SERPL-SCNC: 139 MMOL/L — SIGNIFICANT CHANGE UP (ref 135–146)
SPECIMEN SOURCE: SIGNIFICANT CHANGE UP
WBC # BLD: 7.37 K/UL — SIGNIFICANT CHANGE UP (ref 4.8–10.8)
WBC # FLD AUTO: 7.37 K/UL — SIGNIFICANT CHANGE UP (ref 4.8–10.8)

## 2020-03-22 PROCEDURE — 99233 SBSQ HOSP IP/OBS HIGH 50: CPT

## 2020-03-22 PROCEDURE — 71045 X-RAY EXAM CHEST 1 VIEW: CPT | Mod: 26

## 2020-03-22 RX ORDER — SODIUM CHLORIDE 9 MG/ML
500 INJECTION INTRAMUSCULAR; INTRAVENOUS; SUBCUTANEOUS ONCE
Refills: 0 | Status: COMPLETED | OUTPATIENT
Start: 2020-03-22 | End: 2020-03-22

## 2020-03-22 RX ADMIN — Medication 10 MILLIGRAM(S): at 13:13

## 2020-03-22 RX ADMIN — PANTOPRAZOLE SODIUM 40 MILLIGRAM(S): 20 TABLET, DELAYED RELEASE ORAL at 06:18

## 2020-03-22 RX ADMIN — SODIUM CHLORIDE 1000 MILLILITER(S): 9 INJECTION INTRAMUSCULAR; INTRAVENOUS; SUBCUTANEOUS at 21:04

## 2020-03-22 RX ADMIN — ENOXAPARIN SODIUM 40 MILLIGRAM(S): 100 INJECTION SUBCUTANEOUS at 12:54

## 2020-03-22 RX ADMIN — CHLORHEXIDINE GLUCONATE 1 APPLICATION(S): 213 SOLUTION TOPICAL at 05:15

## 2020-03-22 RX ADMIN — Medication 64.8 MILLIGRAM(S): at 21:04

## 2020-03-22 RX ADMIN — Medication 64.8 MILLIGRAM(S): at 05:15

## 2020-03-22 RX ADMIN — Medication 64.8 MILLIGRAM(S): at 13:13

## 2020-03-22 RX ADMIN — Medication 10 MILLIGRAM(S): at 05:15

## 2020-03-22 RX ADMIN — Medication 10 MILLIGRAM(S): at 21:04

## 2020-03-22 RX ADMIN — SODIUM CHLORIDE 1000 MILLILITER(S): 9 INJECTION INTRAMUSCULAR; INTRAVENOUS; SUBCUTANEOUS at 22:02

## 2020-03-22 NOTE — PROGRESS NOTE ADULT - SUBJECTIVE AND OBJECTIVE BOX
ICU Attending Progress Daily Note     22 Mar 2020 12:10  still awaiting Chiang test    his condition not worse  on NC no fever    He has history of Asthma  Urinary retention  Urinary calculi  Spastic quadriplegia  Osteoporosis  Seizure  Cerebral palsy  BPH (benign prostatic hyperplasia)    Interval event for past 24 hr:  TISH SHAIKH  64y had no event.   Current Complains:  TISH SHAIKH has no new complains  HPI:  64 y/m with PMH of cerebral palsy from group home, seizure disorder, spastic paraplegia, s/p PEG tube, BPH, bladder neck stricture s/p suprapubic cath, history of ESBL, asthma, from group home presented with fever and dry cough for 1 day. He denies any chest pain, no belly pain, change in bladder or bowel habits, tingling or numbness. In ED he was tachypneic and short of breath. CXR showed possible left lung pneumonia, also had a fever in ED. (20 Mar 2020 21:25)    OBJECTIVE:  ICU Vital Signs Last 24 Hrs  T(C): 37.2 (22 Mar 2020 08:00), Max: 37.7 (22 Mar 2020 00:00)  T(F): 99 (22 Mar 2020 08:00), Max: 99.9 (22 Mar 2020 00:00)  HR: 128 (22 Mar 2020 09:00) (64 - 128)  BP: 117/78 (22 Mar 2020 09:00) (80/48 - 133/66)  BP(mean): 104 (22 Mar 2020 09:00) (56 - 104)  ABP: --  ABP(mean): --  RR: 43 (22 Mar 2020 09:00) (16 - 43)  SpO2: 96% (22 Mar 2020 09:00) (94% - 98%)    I&O's Summary    21 Mar 2020 07:01  -  22 Mar 2020 07:00  --------------------------------------------------------  IN: 945 mL / OUT: 1385 mL / NET: -440 mL      I&O's Detail    21 Mar 2020 07:01  -  22 Mar 2020 07:00  --------------------------------------------------------  IN:    Enteral Tube Flush: 215 mL    ns in tub fed  [id number]: 480 mL    Sodium Chloride 0.9% IV Bolus: 250 mL  Total IN: 945 mL    OUT:    Indwelling Catheter - Suprapubic: 1360 mL    Rectal Tube: 25 mL  Total OUT: 1385 mL    Total NET: -440 mL          CAPILLARY BLOOD GLUCOSE        LABS:                          13.3   7.37  )-----------( 163      ( 22 Mar 2020 06:07 )             38.5     03-22    139  |  100  |  14  ----------------------------<  85  4.1   |  24  |  0.6<L>    Ca    8.6      22 Mar 2020 06:07  Phos  2.1     03-22  Mg     1.9     03-22    TPro  6.6  /  Alb  3.8  /  TBili  0.4  /  DBili  x   /  AST  26  /  ALT  16  /  AlkPhos  89  03-22    PT/INR - ( 20 Mar 2020 19:00 )   PT: 12.30 sec;   INR: 1.07 ratio         PTT - ( 20 Mar 2020 19:00 )  PTT:29.3 sec  Urinalysis Basic - ( 20 Mar 2020 20:18 )    Color: Yellow / Appearance: Slightly Turbid / S.018 / pH: x  Gluc: x / Ketone: Negative  / Bili: Negative / Urobili: <2 mg/dL   Blood: x / Protein: 30 mg/dL / Nitrite: Positive   Leuk Esterase: Large / RBC: 4 /HPF / WBC 48 /HPF   Sq Epi: x / Non Sq Epi: 1 /HPF / Bacteria: Many        Culture - Urine (collected 20 Mar 2020 20:18)  Source: .Urine Clean Catch (Midstream)  Preliminary Report (21 Mar 2020 22:22):    >100,000 CFU/ml Gram Negative Rods    Culture - Blood (collected 20 Mar 2020 19:00)  Source: .Blood Blood-Peripheral  Preliminary Report (22 Mar 2020 02:14):    No growth to date.    Culture - Blood (collected 20 Mar 2020 19:00)  Source: .Blood Blood-Peripheral  Preliminary Report (22 Mar 2020 02:14):    No growth to date.      Home Medications:  albuterol 2.5 mg/3 mL (0.083%) inhalation solution: 3 milliliter(s) inhaled every 6 hours, As Needed (20 Mar 2020 21:56)  Artificial Tears ophthalmic solution: 1 drop(s) to each affected eye 4 times a day (20 Mar 2020 21:56)  baclofen 10 mg oral tablet: 1 tab(s) by gastrostomy tube 3 times a day (20 Mar 2020 21:56)  docusate sodium 60 mg/15 mL oral syrup: 100 milligram(s) by gastrostomy tube once a day, As Needed for constipation (20 Mar 2020 21:56)  Oysco 500 (1250 mg calcium carbonate) oral tablet: 1 tab(s) by gastrostomy tube 2 times a day (20 Mar 2020 21:56)  PHENobarbital 64.8 mg oral tablet: 1 tab(s) orally once a day via G tube (20 Mar 2020 21:56)  Ventolin HFA 90 mcg/inh inhalation aerosol: 2 puff(s) inhaled 4 times a day, As Needed (20 Mar 2020 21:56)    HOSPITAL MEDICATIONS:  MEDICATIONS  (STANDING):  baclofen 10 milliGRAM(s) Oral three times a day  chlorhexidine 4% Liquid 1 Application(s) Topical <User Schedule>  enoxaparin Injectable 40 milliGRAM(s) SubCutaneous daily  pantoprazole   Suspension 40 milliGRAM(s) Oral before breakfast  PHENobarbital 64.8 milliGRAM(s) Oral three times a day    MEDICATIONS  (PRN):  acetaminophen   Tablet .. 650 milliGRAM(s) Oral every 6 hours PRN Temp greater or equal to 38C (100.4F), Mild Pain (1 - 3)      REVIEW OF SYSTEMS:  CONSTITUTIONAL: [X] all negative; [ ] weakness, [ ] fevers, [ ] chills  EYES/ENT: [X] all negative; [ ] visual changes, [ ] vertigo, [ ] throat pain   NECK: [X] all negative; [ ] pain, [ ] stiffness  RESPIRATORY: [] all negative, [ ] cough, [ ] wheezing, [ ] hemoptysis, [ ] shortness of breath  CARDIOVASCULAR: [] all negative; [ ] chest pain, [ ] palpitations, [ ] orthopnea  GASTROINTESTINAL: [X] all negative; [ ]abdominal pain, [ ] nausea, [ ] vomiting, [ ] hematemesis, [ ] diarrhea, [ ] constipation, [ ] melena, [ ] hematochezia.  GENITOURINARY: [X] all negative; [ ] dysuria, [ ] frequency, [ ] hematuria  NEUROLOGICAL: [X] all negative; [ ] numbness, [ ] weakness  SKIN: [X] all negative; [ ] itching, [ ] burning, [ ] rashes, [ ] lesions   All other review of systems is negative unless indicated above.    [  ] Unable to assess ROS because     PHYSICAL EXAM:          CONSTITUTIONAL: Well-developed; well-nourished; in no acute distress.   	SKIN: warm, dry  	HEAD: Normocephalic; atraumatic.  	EYES: PERRL, EOM, no conj injection, sclera clear  	ENT: No nasal discharge; airway clear.  	NECK: Supple; non tender.  No midline ttp ctls  	CARD: S1, S2 normal; no murmurs, gallops, or rubs. Regular rate and rhythm. 2+ RPs and DPs bilat, no carotid bruits, no pedal   edema, no calf pain b/l  	RESP: CTA  bilat good air movement No wheezes, rales or rhonchi.  	ABD: Soft, not tender, not distended, no CVA ttp no rebound or guarding, bowel sounds present  	EXT: Normal ROM.  No clubbing, cyanosis or edema.   	  	NEURO: Alert, awake, motor 5/5 R, 5/5 L        RADIOLOGY:  xray  < from: Xray Chest 1 View- PORTABLE-Routine (20 @ 05:47) >    Impression:    Elevated left hemidiaphragm with improved bilateral interstitial prominence.    < end of copied text >    I spent 45 minutes of critical care time examining patient, reviewing vitals, labs, medications, imaging and discussing with the team goals of care to prevent life-threatening in this patient who is at high risk for deterioration or death due to:

## 2020-03-22 NOTE — CHART NOTE - NSCHARTNOTEFT_GEN_A_CORE
Patient is a 65yo male with PMH of cerebral palsy, seizure disorder, spastic paraplegia, s/p PEG tube, BPH with bladder neck stricture s/p suprapubic catheter, and history of ESBL Proteus mirabilis who presented from a group with fever and dry cough of 1 days' duration. Chest X-ray on admission showed possible left lung pneumonia. In the ED, the patient's temperature was 101F. Due to history of group home with fever, COVID-19 testing was sent.    #Fever with opacities on chest X-ray  #Rule out COVID-19  - No sepsis on admission (Tmax 102.4F, no leukocytosis, no tachycardia, no tachypnea)  - Lymphopenia is chronic based on previous CBCs but not this severe  - ID consult, d/c antibiotics  - Fevers likely secondary to aspiration  - Patient currently saturating at 97% on 3L  - Fever resolved without recurrence this morning  - Follow urine culture and blood cultures  - Continue with acetaminophen 650mg PO Q6H PRN fever  - Will monitor off antibiotics  - Follow echocardiogram    #Seizure disorder  - Continue with phenobarbital 64.8mg PO TID    #Spastic paraplegia  #Cerebral palsy  - Continue with baclofen 10mg PO TID    #S/p PEG tube  - start tube feedings    #Asthma  - Currently saturating 97% on 3L    #BPH with bladder neck stricture s/p suprapubic catheter  - UA is grossly positive but likely due to chronic suprapubic catheter  - Follow urine culture    #Misc  - DVT Prophylaxis: Lovenox 40mg SQ QD   - GI Prophylaxis: pantoprazole 40mg PO QD   - Diet: NPO with tube feedings  - Activity: bedrest  - IV Fluids: not indicated  - Code Status: Full Code    Dispo: MICU monitoring for now, pending COVID-19 PCR Patient is a 65yo male with PMH of cerebral palsy, seizure disorder, spastic paraplegia, s/p PEG tube, BPH with bladder neck stricture s/p suprapubic catheter, and history of ESBL Proteus mirabilis who presented from a group with fever and dry cough of 1 days' duration. Chest X-ray on admission showed possible left lung pneumonia. In the ED, the patient's temperature was 101F. Due to history of group home with fever, COVID-19 testing was sent.    #Fever with opacities on chest X-ray  #Rule out COVID-19- NEGATIVE  - No sepsis on admission (Tmax 102.4F, no leukocytosis, no tachycardia, no tachypnea)  - Lymphopenia is chronic based on previous CBCs but not this severe  - ID consult, d/c antibiotics  - Fevers likely secondary to aspiration  - Patient currently saturating at 97% on 3L  - Fever resolved without recurrence this morning  - Follow urine culture and blood cultures  - Continue with acetaminophen 650mg PO Q6H PRN fever  - Will monitor off antibiotics  - Follow echocardiogram  -test came back negative on 3/22/2020    #Seizure disorder  - Continue with phenobarbital 64.8mg PO TID    #Spastic paraplegia  #Cerebral palsy  - Continue with baclofen 10mg PO TID    #S/p PEG tube  - start tube feedings    #Asthma  - Currently saturating 97% on 3L    #BPH with bladder neck stricture s/p suprapubic catheter  - UA is grossly positive but likely due to chronic suprapubic catheter  - Follow urine culture    #Misc  - DVT Prophylaxis: Lovenox 40mg SQ QD   - GI Prophylaxis: pantoprazole 40mg PO QD   - Diet: NPO with tube feedings  - Activity: bedrest  - IV Fluids: not indicated  - Code Status: Full Code    Dispo: MICU monitoring for now, pending COVID-19 PCR Patient is a 63yo male with PMH of cerebral palsy, seizure disorder, spastic paraplegia, s/p PEG tube, BPH with bladder neck stricture s/p suprapubic catheter, and history of ESBL Proteus mirabilis who presented from a group with fever and dry cough of 1 days' duration. Chest X-ray on admission showed possible left lung pneumonia. In the ED, the patient's temperature was 101F. Due to history of group home with fever, COVID-19 testing was sent.    #Fever with opacities on chest X-ray  #Rule out COVID-19- NEGATIVE  - No sepsis on admission (Tmax 102.4F, no leukocytosis, no tachycardia, no tachypnea)  - Lymphopenia is chronic based on previous CBCs but not this severe  - ID consult, d/c antibiotics  - Fevers likely secondary to aspiration  - Patient currently saturating at 97% on 3L  - Fever resolved without recurrence this morning  - Follow urine culture and blood cultures  - Continue with acetaminophen 650mg PO Q6H PRN fever  - Will monitor off antibiotics  - Follow echocardiogram  -test came back negative on 3/22/2020    #Seizure disorder  - Continue with phenobarbital 64.8mg PO TID    #Spastic paraplegia  #Cerebral palsy  - Continue with baclofen 10mg PO TID    #S/p PEG tube  - start tube feedings    #Asthma  - Currently saturating 97% on 3L    #BPH with bladder neck stricture s/p suprapubic catheter  - UA is grossly positive but likely due to chronic suprapubic catheter  - Follow urine culture    #Misc  - DVT Prophylaxis: Lovenox 40mg SQ QD   - GI Prophylaxis: pantoprazole 40mg PO QD   - Diet: NPO with tube feedings  - Activity: bedrest  - IV Fluids: not indicated  - Code Status: Full Code    Dispo: downgrade to floors

## 2020-03-22 NOTE — PROGRESS NOTE ADULT - ASSESSMENT
Assessment and Plans  · Assessment		  64 y/m with PMH of cerebral palsy from group home, seizure disorder, spastic paraplegia, s/p PEG tube, BPH, bladder neck stricture s/p suprapubic cath, history of ESBL, asthma, from group home presented with fever and dry cough for 1 day. He denies any chest pain, no belly pain, change in bladder or bowel habits, tingling or numbness. In ED he was tachypneic and short of breath    IMPRESSION:  #COVID19 : less likly will t r/o due to  he is from a [facility]  -no bacterial PNA  posssible aspiration ?   -no sepsis  -SIRS secondary to aspiration with fevers  -influenza/RSV NG    Plan:  -d/c ABx  as per ID   -f/u COVID19  -BCx  -UA and if pyuria f/u UCx    contiue home meds for zeizurs etc     pt clinicaly stable can be transfred to  a " coved " asolation floor till results of test comes back

## 2020-03-23 LAB
ALBUMIN SERPL ELPH-MCNC: 3.3 G/DL — LOW (ref 3.5–5.2)
ALP SERPL-CCNC: 66 U/L — SIGNIFICANT CHANGE UP (ref 30–115)
ALT FLD-CCNC: 16 U/L — SIGNIFICANT CHANGE UP (ref 0–41)
ANION GAP SERPL CALC-SCNC: 9 MMOL/L — SIGNIFICANT CHANGE UP (ref 7–14)
AST SERPL-CCNC: 29 U/L — SIGNIFICANT CHANGE UP (ref 0–41)
BASOPHILS # BLD AUTO: 0.01 K/UL — SIGNIFICANT CHANGE UP (ref 0–0.2)
BASOPHILS NFR BLD AUTO: 0.3 % — SIGNIFICANT CHANGE UP (ref 0–1)
BILIRUB SERPL-MCNC: 0.2 MG/DL — SIGNIFICANT CHANGE UP (ref 0.2–1.2)
BUN SERPL-MCNC: 13 MG/DL — SIGNIFICANT CHANGE UP (ref 10–20)
CALCIUM SERPL-MCNC: 8.3 MG/DL — LOW (ref 8.5–10.1)
CHLORIDE SERPL-SCNC: 104 MMOL/L — SIGNIFICANT CHANGE UP (ref 98–110)
CO2 SERPL-SCNC: 26 MMOL/L — SIGNIFICANT CHANGE UP (ref 17–32)
CREAT SERPL-MCNC: 0.5 MG/DL — LOW (ref 0.7–1.5)
CRP SERPL-MCNC: 4.69 MG/DL — HIGH (ref 0–0.4)
D DIMER BLD IA.RAPID-MCNC: 166 NG/ML DDU — SIGNIFICANT CHANGE UP (ref 0–230)
EOSINOPHIL # BLD AUTO: 0.08 K/UL — SIGNIFICANT CHANGE UP (ref 0–0.7)
EOSINOPHIL NFR BLD AUTO: 2.2 % — SIGNIFICANT CHANGE UP (ref 0–8)
ERYTHROCYTE [SEDIMENTATION RATE] IN BLOOD: 55 MM/HR — HIGH (ref 0–10)
FERRITIN SERPL-MCNC: 136 NG/ML — SIGNIFICANT CHANGE UP (ref 30–400)
FIBRINOGEN PPP-MCNC: 578 MG/DL — HIGH (ref 204.4–570.6)
GLUCOSE SERPL-MCNC: 159 MG/DL — HIGH (ref 70–99)
HCT VFR BLD CALC: 34.3 % — LOW (ref 42–52)
HGB BLD-MCNC: 12 G/DL — LOW (ref 14–18)
IMM GRANULOCYTES NFR BLD AUTO: 0.3 % — SIGNIFICANT CHANGE UP (ref 0.1–0.3)
LDH SERPL L TO P-CCNC: 165 U/L — SIGNIFICANT CHANGE UP (ref 50–242)
LYMPHOCYTES # BLD AUTO: 0.33 K/UL — LOW (ref 1.2–3.4)
LYMPHOCYTES # BLD AUTO: 9 % — LOW (ref 20.5–51.1)
MAGNESIUM SERPL-MCNC: 1.9 MG/DL — SIGNIFICANT CHANGE UP (ref 1.8–2.4)
MCHC RBC-ENTMCNC: 33.2 PG — HIGH (ref 27–31)
MCHC RBC-ENTMCNC: 35 G/DL — SIGNIFICANT CHANGE UP (ref 32–37)
MCV RBC AUTO: 95 FL — HIGH (ref 80–94)
MONOCYTES # BLD AUTO: 0.56 K/UL — SIGNIFICANT CHANGE UP (ref 0.1–0.6)
MONOCYTES NFR BLD AUTO: 15.3 % — HIGH (ref 1.7–9.3)
NEUTROPHILS # BLD AUTO: 2.67 K/UL — SIGNIFICANT CHANGE UP (ref 1.4–6.5)
NEUTROPHILS NFR BLD AUTO: 72.9 % — SIGNIFICANT CHANGE UP (ref 42.2–75.2)
NRBC # BLD: 0 /100 WBCS — SIGNIFICANT CHANGE UP (ref 0–0)
PHOSPHATE SERPL-MCNC: 2.2 MG/DL — SIGNIFICANT CHANGE UP (ref 2.1–4.9)
PLATELET # BLD AUTO: 149 K/UL — SIGNIFICANT CHANGE UP (ref 130–400)
POTASSIUM SERPL-MCNC: 3.8 MMOL/L — SIGNIFICANT CHANGE UP (ref 3.5–5)
POTASSIUM SERPL-SCNC: 3.8 MMOL/L — SIGNIFICANT CHANGE UP (ref 3.5–5)
PROCALCITONIN SERPL-MCNC: 0.13 NG/ML — HIGH (ref 0.02–0.1)
PROT SERPL-MCNC: 5.8 G/DL — LOW (ref 6–8)
RBC # BLD: 3.61 M/UL — LOW (ref 4.7–6.1)
RBC # FLD: 13.1 % — SIGNIFICANT CHANGE UP (ref 11.5–14.5)
S PNEUM AG UR QL: NEGATIVE — SIGNIFICANT CHANGE UP
SODIUM SERPL-SCNC: 139 MMOL/L — SIGNIFICANT CHANGE UP (ref 135–146)
WBC # BLD: 3.66 K/UL — LOW (ref 4.8–10.8)
WBC # FLD AUTO: 3.66 K/UL — LOW (ref 4.8–10.8)

## 2020-03-23 PROCEDURE — 93306 TTE W/DOPPLER COMPLETE: CPT | Mod: 26

## 2020-03-23 PROCEDURE — 71045 X-RAY EXAM CHEST 1 VIEW: CPT | Mod: 26

## 2020-03-23 PROCEDURE — 99233 SBSQ HOSP IP/OBS HIGH 50: CPT

## 2020-03-23 RX ORDER — SODIUM CHLORIDE 9 MG/ML
1000 INJECTION INTRAMUSCULAR; INTRAVENOUS; SUBCUTANEOUS
Refills: 0 | Status: DISCONTINUED | OUTPATIENT
Start: 2020-03-23 | End: 2020-03-23

## 2020-03-23 RX ADMIN — Medication 10 MILLIGRAM(S): at 21:25

## 2020-03-23 RX ADMIN — ENOXAPARIN SODIUM 40 MILLIGRAM(S): 100 INJECTION SUBCUTANEOUS at 13:07

## 2020-03-23 RX ADMIN — Medication 64.8 MILLIGRAM(S): at 21:24

## 2020-03-23 RX ADMIN — CHLORHEXIDINE GLUCONATE 1 APPLICATION(S): 213 SOLUTION TOPICAL at 06:09

## 2020-03-23 RX ADMIN — Medication 64.8 MILLIGRAM(S): at 13:06

## 2020-03-23 RX ADMIN — Medication 10 MILLIGRAM(S): at 06:09

## 2020-03-23 RX ADMIN — Medication 10 MILLIGRAM(S): at 13:06

## 2020-03-23 RX ADMIN — Medication 64.8 MILLIGRAM(S): at 06:09

## 2020-03-23 RX ADMIN — PANTOPRAZOLE SODIUM 40 MILLIGRAM(S): 20 TABLET, DELAYED RELEASE ORAL at 06:09

## 2020-03-23 NOTE — PROGRESS NOTE ADULT - ASSESSMENT
Patient is a 65yo male with PMH of cerebral palsy, seizure disorder, spastic paraplegia, s/p PEG tube, BPH with bladder neck stricture s/p suprapubic catheter, and history of ESBL Proteus mirabilis who presented from a group with fever and dry cough of 1 days' duration, admitted for COVID-19 r/o and pna     #Fever with opacities on chest X-ray- aspiration pneumonia most likely dx, covid-19 r/o still requiring O2  - COVID-19- NEGATIVE  - Lymphopenia is chronic based on previous CBCs but not this severe  - monitor out-patient  - Fevers are likely due to aspiration  - Patient currently saturating well on 3L  - Fever resolved without recurrence this morning  - Follow urine culture and blood cultures  - Continue with acetaminophen 650mg PO Q6H PRN fever  - Will monitor off antibiotics  - Follow echocardiogram    #Seizure disorder  - Continue with phenobarbital 64.8mg PO TID    #Cerebral palsy  - Continue with baclofen 10mg PO TID    #S/p PEG tube  -start tube feedings  -will add 250mL free water    #Asthma  - Currently saturating 97% on 3L    #BPH with bladder neck stricture s/p suprapubic catheter  - UA is grossly positive but likely due to chronic suprapubic catheter  - Follow urine culture  ________________________________________________________________________________________________________________________    DVT Prophylaxis: Lovenox 40mg SQ QD   GI Prophylaxis: pantoprazole 40mg PO QD   Diet: NPO with tube feedings  Activity: OOB to chair   IV Fluids: not indicated  Code Status: Full Code    Dispo: monitor for 24 hours, still requiring O2 Patient is a 63yo male with PMH of cerebral palsy, seizure disorder, spastic paraplegia, s/p PEG tube, BPH with bladder neck stricture s/p suprapubic catheter, and history of ESBL Proteus mirabilis who presented from a group with fever and dry cough of 1 days' duration, admitted for COVID-19 r/o and pna     #Fever with opacities on chest X-ray- aspiration pneumonitis most likely dx, covid-19 r/o still requiring O2  - COVID-19- NEGATIVE  - Lymphopenia is chronic based on previous CBCs but not this severe  - monitor out-patient  - Fevers are likely due to aspiration  - Patient currently saturating well on 3L  - Fever resolved without recurrence this morning  - Follow urine culture and blood cultures  - Continue with acetaminophen 650mg PO Q6H PRN fever  - Will monitor off antibiotics  - Follow echocardiogram    #Seizure disorder  - Continue with phenobarbital 64.8mg PO TID    #Cerebral palsy  - Continue with baclofen 10mg PO TID    #S/p PEG tube  -start tube feedings  -will add 250mL free water    #Asthma  - Currently saturating 97% on 3L    #BPH with bladder neck stricture s/p suprapubic catheter  - UA is grossly positive but likely due to chronic suprapubic catheter  - Follow urine culture  ________________________________________________________________________________________________________________________    DVT Prophylaxis: Lovenox 40mg SQ QD   GI Prophylaxis: pantoprazole 40mg PO QD   Diet: NPO with tube feedings  Activity: OOB to chair   IV Fluids: not indicated  Code Status: Full Code    Dispo: monitor for 24 hours, still requiring O2

## 2020-03-23 NOTE — PROGRESS NOTE ADULT - SUBJECTIVE AND OBJECTIVE BOX
SUBJECTIVE:  HPI:64 y/m with PMH of cerebral palsy from group home, seizure disorder, spastic paraplegia, s/p PEG tube, BPH, bladder neck stricture s/p suprapubic cath, history of ESBL, asthma, from group home presented with fever and dry cough for 1 day. He denies any chest pain, no belly pain, change in bladder or bowel habits, tingling or numbness. In ED he was tachypneic and short of breath. CXR showed possible left lung pneumonia, also had a fever in ED. (20 Mar 2020 21:25)    SUMMARY AND RECENT EVENTS  Patient is a 64y old Male who presents with a chief complaint of Shortness of breath/ Cough (22 Mar 2020 12:10)  Currently admitted to medicine with the primary diagnosis of Respiratory distress, downgraded from ICU  Today is hospital day 3d. This morning patient is satting well on 3L NC, and this AM required 2 500 NS cc boluses to maintain blood pressure       PAST MEDICAL & SURGICAL HISTORY  Asthma  Urinary retention  Urinary calculi  Spastic quadriplegia  Osteoporosis  Seizure: last seizure &gt;10 years ago  Cerebral palsy  BPH (benign prostatic hyperplasia)  Suprapubic catheter  H/O cystoscopy  S/P percutaneous endoscopic gastrostomy (PEG) tube placement      ALLERGIES:  No Known Allergies    MEDICATIONS:  STANDING MEDICATIONS  baclofen 10 milliGRAM(s) Oral three times a day  chlorhexidine 4% Liquid 1 Application(s) Topical <User Schedule>  enoxaparin Injectable 40 milliGRAM(s) SubCutaneous daily  pantoprazole   Suspension 40 milliGRAM(s) Oral before breakfast  PHENobarbital 64.8 milliGRAM(s) Oral three times a day  sodium chloride 0.9%. 1000 milliLiter(s) IV Continuous <Continuous>    PRN MEDICATIONS  acetaminophen   Tablet .. 650 milliGRAM(s) Oral every 6 hours PRN    VITALS:   T(F): 99.8  HR: 91  BP: 95/55  RR: 18  SpO2: 91%    LABS:                        12.0   3.66  )-----------( 149      ( 23 Mar 2020 07:20 )             34.3     03-23    139  |  104  |  13  ----------------------------<  159<H>  3.8   |  26  |  0.5<L>    Ca    8.3<L>      23 Mar 2020 07:20  Phos  2.2     03-23  Mg     1.9     03-23    TPro  5.8<L>  /  Alb  3.3<L>  /  TBili  0.2  /  DBili  x   /  AST  29  /  ALT  16  /  AlkPhos  66  03-23      Sedimentation Rate, Erythrocyte: 55 mm/Hr <H> (03-23-20 @ 07:20)    Culture - Urine (collected 20 Mar 2020 20:18)  Source: .Urine Clean Catch (Midstream)  Final Report (22 Mar 2020 17:22):    >=3 organisms. Probable collection contamination.    Culture - Blood (collected 20 Mar 2020 19:00)  Source: .Blood Blood-Peripheral  Preliminary Report (22 Mar 2020 02:14):    No growth to date.    Culture - Blood (collected 20 Mar 2020 19:00)  Source: .Blood Blood-Peripheral  Preliminary Report (22 Mar 2020 02:14):    No growth to date.      PHYSICAL EXAM:  GEN: No acute distress, awake, following commands, responds to questions  HEENT: stigmata of CP noted  LUNGS: wheezes noted   HEART: S1/S2 present. RRR.   ABD: Soft, non-tender, non-distended  EXT: no edema, contracted hands   NEURO: awake, following commands, responds to questions SUBJECTIVE:  HPI:64 y/m with PMH of cerebral palsy from group home, seizure disorder, spastic paraplegia, s/p PEG tube, BPH, bladder neck stricture s/p suprapubic cath, history of ESBL, asthma, from group home presented with fever and dry cough for 1 day. He denies any chest pain, no belly pain, change in bladder or bowel habits, tingling or numbness. In ED he was tachypneic and short of breath. CXR showed possible left lung pneumonia, also had a fever in ED. (20 Mar 2020 21:25)    SUMMARY AND RECENT EVENTS  Patient is a 64y old Male who presents with a chief complaint of Shortness of breath/ Cough (22 Mar 2020 12:10)  Currently admitted to medicine with the primary diagnosis of Respiratory distress, downgraded from ICU  Today is hospital day 3d. This morning patient is satting well on 3L NC, and this AM required 2 500 NS cc boluses to maintain blood pressure       PAST MEDICAL & SURGICAL HISTORY  Asthma  Urinary retention  Urinary calculi  Spastic quadriplegia  Osteoporosis  Seizure: last seizure &gt;10 years ago  Cerebral palsy  BPH (benign prostatic hyperplasia)  Suprapubic catheter  H/O cystoscopy  S/P percutaneous endoscopic gastrostomy (PEG) tube placement      ALLERGIES:  No Known Allergies    MEDICATIONS:  STANDING MEDICATIONS  baclofen 10 milliGRAM(s) Oral three times a day  chlorhexidine 4% Liquid 1 Application(s) Topical <User Schedule>  enoxaparin Injectable 40 milliGRAM(s) SubCutaneous daily  pantoprazole   Suspension 40 milliGRAM(s) Oral before breakfast  PHENobarbital 64.8 milliGRAM(s) Oral three times a day  sodium chloride 0.9%. 1000 milliLiter(s) IV Continuous <Continuous>    PRN MEDICATIONS  acetaminophen   Tablet .. 650 milliGRAM(s) Oral every 6 hours PRN    VITALS:   T(F): 99.8  HR: 91  BP: 95/55  RR: 18  SpO2: 91%    LABS:                        12.0   3.66  )-----------( 149      ( 23 Mar 2020 07:20 )             34.3     03-23    139  |  104  |  13  ----------------------------<  159<H>  3.8   |  26  |  0.5<L>    Ca    8.3<L>      23 Mar 2020 07:20  Phos  2.2     03-23  Mg     1.9     03-23    TPro  5.8<L>  /  Alb  3.3<L>  /  TBili  0.2  /  DBili  x   /  AST  29  /  ALT  16  /  AlkPhos  66  03-23      Sedimentation Rate, Erythrocyte: 55 mm/Hr <H> (03-23-20 @ 07:20)    Culture - Urine (collected 20 Mar 2020 20:18)  Source: .Urine Clean Catch (Midstream)  Final Report (22 Mar 2020 17:22):    >=3 organisms. Probable collection contamination.    Culture - Blood (collected 20 Mar 2020 19:00)  Source: .Blood Blood-Peripheral  Preliminary Report (22 Mar 2020 02:14):    No growth to date.    Culture - Blood (collected 20 Mar 2020 19:00)  Source: .Blood Blood-Peripheral  Preliminary Report (22 Mar 2020 02:14):    No growth to date.      PHYSICAL EXAM:  GEN: No acute distress, awake, following commands, responds to questions  HEENT: stigmata of CP noted  Pulm: B/L ronchi    HEART: S1/S2 present. RRR.   ABD: Soft, non-tender, non-distended  EXT: no edema, contracted hands   NEURO: awake, following commands, responds to questions

## 2020-03-24 LAB
ANION GAP SERPL CALC-SCNC: 11 MMOL/L — SIGNIFICANT CHANGE UP (ref 7–14)
BASOPHILS # BLD AUTO: 0.02 K/UL — SIGNIFICANT CHANGE UP (ref 0–0.2)
BASOPHILS NFR BLD AUTO: 0.7 % — SIGNIFICANT CHANGE UP (ref 0–1)
BUN SERPL-MCNC: 14 MG/DL — SIGNIFICANT CHANGE UP (ref 10–20)
CALCIUM SERPL-MCNC: 7.9 MG/DL — LOW (ref 8.5–10.1)
CHLORIDE SERPL-SCNC: 106 MMOL/L — SIGNIFICANT CHANGE UP (ref 98–110)
CO2 SERPL-SCNC: 25 MMOL/L — SIGNIFICANT CHANGE UP (ref 17–32)
CREAT SERPL-MCNC: 0.5 MG/DL — LOW (ref 0.7–1.5)
EOSINOPHIL # BLD AUTO: 0.09 K/UL — SIGNIFICANT CHANGE UP (ref 0–0.7)
EOSINOPHIL NFR BLD AUTO: 3.1 % — SIGNIFICANT CHANGE UP (ref 0–8)
GLUCOSE SERPL-MCNC: 119 MG/DL — HIGH (ref 70–99)
HCOV PNL SPEC NAA+PROBE: DETECTED
HCT VFR BLD CALC: 36.7 % — LOW (ref 42–52)
HGB BLD-MCNC: 12.3 G/DL — LOW (ref 14–18)
IMM GRANULOCYTES NFR BLD AUTO: 0.3 % — SIGNIFICANT CHANGE UP (ref 0.1–0.3)
LYMPHOCYTES # BLD AUTO: 0.75 K/UL — LOW (ref 1.2–3.4)
LYMPHOCYTES # BLD AUTO: 26.2 % — SIGNIFICANT CHANGE UP (ref 20.5–51.1)
MCHC RBC-ENTMCNC: 32 PG — HIGH (ref 27–31)
MCHC RBC-ENTMCNC: 33.5 G/DL — SIGNIFICANT CHANGE UP (ref 32–37)
MCV RBC AUTO: 95.6 FL — HIGH (ref 80–94)
MONOCYTES # BLD AUTO: 0.58 K/UL — SIGNIFICANT CHANGE UP (ref 0.1–0.6)
MONOCYTES NFR BLD AUTO: 20.3 % — HIGH (ref 1.7–9.3)
NEUTROPHILS # BLD AUTO: 1.41 K/UL — SIGNIFICANT CHANGE UP (ref 1.4–6.5)
NEUTROPHILS NFR BLD AUTO: 49.4 % — SIGNIFICANT CHANGE UP (ref 42.2–75.2)
NRBC # BLD: 0 /100 WBCS — SIGNIFICANT CHANGE UP (ref 0–0)
PLATELET # BLD AUTO: 139 K/UL — SIGNIFICANT CHANGE UP (ref 130–400)
POTASSIUM SERPL-MCNC: 4.2 MMOL/L — SIGNIFICANT CHANGE UP (ref 3.5–5)
POTASSIUM SERPL-SCNC: 4.2 MMOL/L — SIGNIFICANT CHANGE UP (ref 3.5–5)
RAPID RVP RESULT: DETECTED
RBC # BLD: 3.84 M/UL — LOW (ref 4.7–6.1)
RBC # FLD: 13.2 % — SIGNIFICANT CHANGE UP (ref 11.5–14.5)
SODIUM SERPL-SCNC: 142 MMOL/L — SIGNIFICANT CHANGE UP (ref 135–146)
WBC # BLD: 2.86 K/UL — LOW (ref 4.8–10.8)
WBC # FLD AUTO: 2.86 K/UL — LOW (ref 4.8–10.8)

## 2020-03-24 PROCEDURE — 71045 X-RAY EXAM CHEST 1 VIEW: CPT | Mod: 26

## 2020-03-24 PROCEDURE — 99233 SBSQ HOSP IP/OBS HIGH 50: CPT

## 2020-03-24 PROCEDURE — 71250 CT THORAX DX C-: CPT | Mod: 26

## 2020-03-24 RX ORDER — SODIUM CHLORIDE 9 MG/ML
500 INJECTION INTRAMUSCULAR; INTRAVENOUS; SUBCUTANEOUS ONCE
Refills: 0 | Status: COMPLETED | OUTPATIENT
Start: 2020-03-24 | End: 2020-03-24

## 2020-03-24 RX ORDER — ALBUTEROL 90 UG/1
1 AEROSOL, METERED ORAL ONCE
Refills: 0 | Status: COMPLETED | OUTPATIENT
Start: 2020-03-24 | End: 2020-03-24

## 2020-03-24 RX ADMIN — ENOXAPARIN SODIUM 40 MILLIGRAM(S): 100 INJECTION SUBCUTANEOUS at 11:09

## 2020-03-24 RX ADMIN — PANTOPRAZOLE SODIUM 40 MILLIGRAM(S): 20 TABLET, DELAYED RELEASE ORAL at 05:36

## 2020-03-24 RX ADMIN — Medication 10 MILLIGRAM(S): at 21:14

## 2020-03-24 RX ADMIN — Medication 64.8 MILLIGRAM(S): at 18:00

## 2020-03-24 RX ADMIN — Medication 10 MILLIGRAM(S): at 14:26

## 2020-03-24 RX ADMIN — Medication 64.8 MILLIGRAM(S): at 05:35

## 2020-03-24 RX ADMIN — SODIUM CHLORIDE 1000 MILLILITER(S): 9 INJECTION INTRAMUSCULAR; INTRAVENOUS; SUBCUTANEOUS at 05:36

## 2020-03-24 RX ADMIN — CHLORHEXIDINE GLUCONATE 1 APPLICATION(S): 213 SOLUTION TOPICAL at 05:37

## 2020-03-24 RX ADMIN — Medication 64.8 MILLIGRAM(S): at 21:14

## 2020-03-24 RX ADMIN — SODIUM CHLORIDE 1000 MILLILITER(S): 9 INJECTION INTRAMUSCULAR; INTRAVENOUS; SUBCUTANEOUS at 06:35

## 2020-03-24 RX ADMIN — ALBUTEROL 1 PUFF(S): 90 AEROSOL, METERED ORAL at 05:58

## 2020-03-24 RX ADMIN — Medication 10 MILLIGRAM(S): at 05:35

## 2020-03-24 NOTE — DIETITIAN INITIAL EVALUATION ADULT. - FEEDING SKILL
Pt sleeping soundly during attempt of assessment. Attempted to reach emergency contact in chart but no response. No note of EN at group home in paperchart.

## 2020-03-24 NOTE — DIETITIAN INITIAL EVALUATION ADULT. - ENERGY NEEDS
estimated calorie needs = 5607-3540 kcal/day (MSJ x1.2-1.3).   estimated protein needs = 55-66 g/day (1.0-1.2 g/kg CBW).   estimated fluid needs = 1mL/kcal

## 2020-03-24 NOTE — DIETITIAN INITIAL EVALUATION ADULT. - REASON INDICATOR FOR ASSESSMENT
EN PTA--seen late d/t error in RD screening. note this RD was not covering unit when pt was screened.

## 2020-03-24 NOTE — PROGRESS NOTE ADULT - SUBJECTIVE AND OBJECTIVE BOX
SUBJECTIVE:  HPI:64 y/m with PMH of cerebral palsy from group home, seizure disorder, spastic paraplegia, s/p PEG tube, BPH, bladder neck stricture s/p suprapubic cath, history of ESBL, asthma, from group home presented with fever and dry cough for 1 day. He denies any chest pain, no belly pain, change in bladder or bowel habits, tingling or numbness. In ED he was tachypneic and short of breath. CXR showed possible left lung pneumonia, also had a fever in ED. (20 Mar 2020 21:25)    SUMMARY AND RECENT EVENTS  Patient is a 64y old Male who presents with a chief complaint of Shortness of breath/ Cough (23 Mar 2020 12:24)  Currently admitted to medicine with the primary diagnosis of Respiratory distress, differentials including aspiration pneumonitis with COVID-19 test being negative  Today is hospital day 4d. Patient had overnight temperature of 101, and required 1L of fluids to maintain blood pressure       PAST MEDICAL & SURGICAL HISTORY  Asthma  Urinary retention  Urinary calculi  Spastic quadriplegia  Osteoporosis  Seizure: last seizure &gt;10 years ago  Cerebral palsy  BPH (benign prostatic hyperplasia)  Suprapubic catheter  H/O cystoscopy  S/P percutaneous endoscopic gastrostomy (PEG) tube placement      ALLERGIES:  No Known Allergies    MEDICATIONS:  STANDING MEDICATIONS  baclofen 10 milliGRAM(s) Oral three times a day  chlorhexidine 4% Liquid 1 Application(s) Topical <User Schedule>  enoxaparin Injectable 40 milliGRAM(s) SubCutaneous daily  pantoprazole   Suspension 40 milliGRAM(s) Oral before breakfast  PHENobarbital 64.8 milliGRAM(s) Oral three times a day    PRN MEDICATIONS  acetaminophen   Tablet .. 650 milliGRAM(s) Oral every 6 hours PRN    VITALS:   T(F): 98.9  HR: 81  BP: 102/59  RR: 18  SpO2: 93%    LABS:                        12.3   2.86  )-----------( 139      ( 24 Mar 2020 06:45 )             36.7     03-24    142  |  106  |  14  ----------------------------<  119<H>  4.2   |  25  |  0.5<L>    Ca    7.9<L>      24 Mar 2020 06:45  Phos  2.2     03-23  Mg     1.9     03-23    TPro  5.8<L>  /  Alb  3.3<L>  /  TBili  0.2  /  DBili  x   /  AST  29  /  ALT  16  /  AlkPhos  66  03-23      RADIOLOGY:    PHYSICAL EXAM:  GEN: No acute distress  LUNGS: Clear to auscultation bilaterally   HEART: S1/S2 present. RRR.   ABD: Soft, non-tender, non-distended. Bowel sounds present  EXT: NC/NC/NE/2+PP/REBOLLAR/Skin Intact.   NEURO: AAOX3    Intravenous access:   NG tube:   Edouard cathter: SUBJECTIVE:  HPI:64 y/m with PMH of cerebral palsy from group home, seizure disorder, spastic paraplegia, s/p PEG tube, BPH, bladder neck stricture s/p suprapubic cath, history of ESBL, asthma, from group home presented with fever and dry cough for 1 day. He denies any chest pain, no belly pain, change in bladder or bowel habits, tingling or numbness. In ED he was tachypneic and short of breath. CXR showed possible left lung pneumonia, also had a fever in ED. (20 Mar 2020 21:25)    SUMMARY AND RECENT EVENTS  Patient is a 64y old Male who presents with a chief complaint of Shortness of breath/ Cough (23 Mar 2020 12:24)  Currently admitted to medicine with the primary diagnosis of Respiratory distress, differentials including aspiration pneumonitis with COVID-19 test being negative  Today is hospital day 4d. Patient had overnight temperature of 101, and required 1L of fluids to maintain blood pressure       PAST MEDICAL & SURGICAL HISTORY  Asthma  Urinary retention  Urinary calculi  Spastic quadriplegia  Osteoporosis  Seizure: last seizure &gt;10 years ago  Cerebral palsy  BPH (benign prostatic hyperplasia)  Suprapubic catheter  H/O cystoscopy  S/P percutaneous endoscopic gastrostomy (PEG) tube placement      ALLERGIES:  No Known Allergies    MEDICATIONS:  STANDING MEDICATIONS  baclofen 10 milliGRAM(s) Oral three times a day  chlorhexidine 4% Liquid 1 Application(s) Topical <User Schedule>  enoxaparin Injectable 40 milliGRAM(s) SubCutaneous daily  pantoprazole   Suspension 40 milliGRAM(s) Oral before breakfast  PHENobarbital 64.8 milliGRAM(s) Oral three times a day    PRN MEDICATIONS  acetaminophen   Tablet .. 650 milliGRAM(s) Oral every 6 hours PRN    VITALS:   T(F): 98.9  HR: 81  BP: 102/59  RR: 18  SpO2: 93%    LABS:                        12.3   2.86  )-----------( 139      ( 24 Mar 2020 06:45 )             36.7     03-24    142  |  106  |  14  ----------------------------<  119<H>  4.2   |  25  |  0.5<L>    Ca    7.9<L>      24 Mar 2020 06:45  Phos  2.2     03-23  Mg     1.9     03-23    TPro  5.8<L>  /  Alb  3.3<L>  /  TBili  0.2  /  DBili  x   /  AST  29  /  ALT  16  /  AlkPhos  66  03-23      PHYSICAL EXAM:  GEN: No acute distress  HEENT: stigmata of CP noted, on 3L NC  LUNGS: rhonchi noted  HEART: S1/S2 present. RRR.   ABD: Soft, non-tender, non-distended. Bowel sounds present  EXT: contracted UE b/l   NEURO: AAOX3

## 2020-03-24 NOTE — DIETITIAN INITIAL EVALUATION ADULT. - OTHER INFO
Pt p/w SOB and cough at group home with primary dx: respiratory distress. Hospital course complicated by Fever with opacities on CXR, COVID-19 negative, likely d/t aspiration pneumonitis--f/u urine and blood Cx and repeat CXR. h/o cerebral palsy s/p PEG placement.

## 2020-03-24 NOTE — PROGRESS NOTE ADULT - ASSESSMENT
Patient is a 63yo male with PMH of cerebral palsy, seizure disorder, spastic paraplegia, s/p PEG tube, BPH with bladder neck stricture s/p suprapubic catheter, and history of ESBL Proteus mirabilis who presented from a group with fever and dry cough of 1 days' duration, admitted for COVID-19 r/o and pna     #Fever with opacities on chest X-ray- aspiration pneumonitis most likely dx, covid-19 r/o,  still requiring O2  - COVID-19- NEGATIVE  - Lymphopenia is chronic based on previous CBCs but not this severe. continue to monitor  - had fever overnight -101  - Patient currently saturating well on 3L, desat to 90% on room air  - Follow urine culture and blood cultures  - repeat CXR  - echocardiogram-no sig findings    #Seizure disorder  - Continue with phenobarbital 64.8mg PO TID    #Cerebral palsy  - Continue with baclofen 10mg PO TID    #S/p PEG tube  -start tube feedings  -will add 250mL free water    #Asthma-stable, no wheezing on PE  - Currently saturating 97% on 3L    #BPH with bladder neck stricture s/p suprapubic catheter  - UA is grossly positive but likely due to chronic suprapubic catheter  - Follow repeat urine culture, first culture was contaminate   ________________________________________________________________________________________________________________________    DVT Prophylaxis: Lovenox 40mg SQ QD   GI Prophylaxis: pantoprazole 40mg PO QD   Diet: NPO with tube feedings  Activity: OOB to chair   IV Fluids: not indicated  Code Status: Full Code    Dispo: monitor for 24 hours, still requiring O2 Patient is a 65yo male with PMH of cerebral palsy, seizure disorder, spastic paraplegia, s/p PEG tube, BPH with bladder neck stricture s/p suprapubic catheter, and history of ESBL Proteus mirabilis who presented from a group with fever and dry cough of 1 days' duration, admitted for COVID-19 r/o and pna     #Fever with opacities on chest X-ray- aspiration pneumonitis most likely dx, covid-19 r/o,  still requiring O2  - COVID-19- NEGATIVE  - Lymphopenia is chronic based on previous CBCs but not this severe. continue to monitor  - had fever overnight -101  - Patient currently saturating well on 3L, desat to 90% on room air  - Follow urine culture and blood cultures  - repeat CXR  - echocardiogram-no sig findings  - chest PT Q4 hours  - may consider empiric abx , currently not on abx    #Seizure disorder  - Continue with phenobarbital 64.8mg PO TID    #Cerebral palsy  - Continue with baclofen 10mg PO TID    #S/p PEG tube  -start tube feedings  -will add 250mL free water    #Asthma-stable, no wheezing on PE  - Currently saturating 97% on 3L    #BPH with bladder neck stricture s/p suprapubic catheter  - UA is grossly positive but likely due to chronic suprapubic catheter  - Follow repeat urine culture, first culture was contaminate   ________________________________________________________________________________________________________________________    DVT Prophylaxis: Lovenox 40mg SQ QD   GI Prophylaxis: pantoprazole 40mg PO QD   Diet: NPO with tube feedings  Activity: OOB to chair   IV Fluids: not indicated  Code Status: Full Code    Dispo: monitor for 24 hours, still requiring O2

## 2020-03-24 NOTE — PROGRESS NOTE ADULT - ASSESSMENT
· Assessment		  64 y/m with PMH of cerebral palsy from group home, seizure disorder, spastic paraplegia, s/p PEG tube, BPH, bladder neck stricture s/p suprapubic cath, history of ESBL, asthma, from group home presented with fever and dry cough for 1 day. He denies any chest pain, no belly pain, change in bladder or bowel habits, tingling or numbness. In ED he was tachypneic and short of breath    IMPRESSION:  #COVID19 : NG. False NG ? Procal 0.13 very suggestive of a viral etiology and not a bacterial infection  -no bacterial PNA  -no sepsis  -BCX NG  -legionella NG  -UCx NG  -SIRS secondary to aspiration with fevers ?  -influenza/RSV NG    RECOMMENDATIONS:  -CT chest   -hold off on ABx

## 2020-03-24 NOTE — DIETITIAN INITIAL EVALUATION ADULT. - ADD RECOMMEND
RD will monitor diet order ,energy intake, nutrition related labs, body composition, NFPF (TF tolerance)

## 2020-03-24 NOTE — DIETITIAN INITIAL EVALUATION ADULT. - ENTERAL
Adjust TF order to Jevity 1.2 @ 360mL q8H +1 pack/day Prosource TF. TF at goal rate will provide 1336kcal, 70g protein, 875mL free H2O and 100% RDIs. Recommend 75 cc pre/post flushes or per LIP. Maintain aspiration precautions.

## 2020-03-24 NOTE — DIETITIAN INITIAL EVALUATION ADULT. - ENERGY INTAKE
TF meeting 73% kcal and 80% protein needs--insufficient. Recs at end of document d/w LIP (Dr. Fletcher).

## 2020-03-25 LAB
ALBUMIN SERPL ELPH-MCNC: 3.5 G/DL — SIGNIFICANT CHANGE UP (ref 3.5–5.2)
ALP SERPL-CCNC: 70 U/L — SIGNIFICANT CHANGE UP (ref 30–115)
ALT FLD-CCNC: 25 U/L — SIGNIFICANT CHANGE UP (ref 0–41)
ANION GAP SERPL CALC-SCNC: 13 MMOL/L — SIGNIFICANT CHANGE UP (ref 7–14)
AST SERPL-CCNC: 35 U/L — SIGNIFICANT CHANGE UP (ref 0–41)
BASOPHILS # BLD AUTO: 0.02 K/UL — SIGNIFICANT CHANGE UP (ref 0–0.2)
BASOPHILS NFR BLD AUTO: 0.5 % — SIGNIFICANT CHANGE UP (ref 0–1)
BILIRUB SERPL-MCNC: 0.3 MG/DL — SIGNIFICANT CHANGE UP (ref 0.2–1.2)
BUN SERPL-MCNC: 12 MG/DL — SIGNIFICANT CHANGE UP (ref 10–20)
CALCIUM SERPL-MCNC: 8.7 MG/DL — SIGNIFICANT CHANGE UP (ref 8.5–10.1)
CHLORIDE SERPL-SCNC: 101 MMOL/L — SIGNIFICANT CHANGE UP (ref 98–110)
CO2 SERPL-SCNC: 25 MMOL/L — SIGNIFICANT CHANGE UP (ref 17–32)
CREAT SERPL-MCNC: 0.5 MG/DL — LOW (ref 0.7–1.5)
EOSINOPHIL # BLD AUTO: 0.13 K/UL — SIGNIFICANT CHANGE UP (ref 0–0.7)
EOSINOPHIL NFR BLD AUTO: 3 % — SIGNIFICANT CHANGE UP (ref 0–8)
GLUCOSE SERPL-MCNC: 95 MG/DL — SIGNIFICANT CHANGE UP (ref 70–99)
HCT VFR BLD CALC: 38.4 % — LOW (ref 42–52)
HGB BLD-MCNC: 12.9 G/DL — LOW (ref 14–18)
IMM GRANULOCYTES NFR BLD AUTO: 0.2 % — SIGNIFICANT CHANGE UP (ref 0.1–0.3)
LYMPHOCYTES # BLD AUTO: 0.96 K/UL — LOW (ref 1.2–3.4)
LYMPHOCYTES # BLD AUTO: 21.8 % — SIGNIFICANT CHANGE UP (ref 20.5–51.1)
MCHC RBC-ENTMCNC: 31.6 PG — HIGH (ref 27–31)
MCHC RBC-ENTMCNC: 33.6 G/DL — SIGNIFICANT CHANGE UP (ref 32–37)
MCV RBC AUTO: 94.1 FL — HIGH (ref 80–94)
MONOCYTES # BLD AUTO: 0.63 K/UL — HIGH (ref 0.1–0.6)
MONOCYTES NFR BLD AUTO: 14.3 % — HIGH (ref 1.7–9.3)
NEUTROPHILS # BLD AUTO: 2.65 K/UL — SIGNIFICANT CHANGE UP (ref 1.4–6.5)
NEUTROPHILS NFR BLD AUTO: 60.2 % — SIGNIFICANT CHANGE UP (ref 42.2–75.2)
NRBC # BLD: 0 /100 WBCS — SIGNIFICANT CHANGE UP (ref 0–0)
PLATELET # BLD AUTO: 162 K/UL — SIGNIFICANT CHANGE UP (ref 130–400)
POTASSIUM SERPL-MCNC: 4.1 MMOL/L — SIGNIFICANT CHANGE UP (ref 3.5–5)
POTASSIUM SERPL-SCNC: 4.1 MMOL/L — SIGNIFICANT CHANGE UP (ref 3.5–5)
PROCALCITONIN SERPL-MCNC: 0.09 NG/ML — SIGNIFICANT CHANGE UP (ref 0.02–0.1)
PROT SERPL-MCNC: 6.4 G/DL — SIGNIFICANT CHANGE UP (ref 6–8)
RBC # BLD: 4.08 M/UL — LOW (ref 4.7–6.1)
RBC # FLD: 12.9 % — SIGNIFICANT CHANGE UP (ref 11.5–14.5)
SARS-COV-2 RNA SPEC QL NAA+PROBE: SIGNIFICANT CHANGE UP
SODIUM SERPL-SCNC: 139 MMOL/L — SIGNIFICANT CHANGE UP (ref 135–146)
WBC # BLD: 4.4 K/UL — LOW (ref 4.8–10.8)
WBC # FLD AUTO: 4.4 K/UL — LOW (ref 4.8–10.8)

## 2020-03-25 PROCEDURE — 99233 SBSQ HOSP IP/OBS HIGH 50: CPT

## 2020-03-25 RX ORDER — GUAIFENESIN/DEXTROMETHORPHAN 600MG-30MG
5 TABLET, EXTENDED RELEASE 12 HR ORAL EVERY 6 HOURS
Refills: 0 | Status: DISCONTINUED | OUTPATIENT
Start: 2020-03-25 | End: 2020-03-27

## 2020-03-25 RX ADMIN — Medication 64.8 MILLIGRAM(S): at 15:07

## 2020-03-25 RX ADMIN — ENOXAPARIN SODIUM 40 MILLIGRAM(S): 100 INJECTION SUBCUTANEOUS at 14:42

## 2020-03-25 RX ADMIN — Medication 64.8 MILLIGRAM(S): at 06:46

## 2020-03-25 RX ADMIN — Medication 10 MILLIGRAM(S): at 14:42

## 2020-03-25 RX ADMIN — Medication 64.8 MILLIGRAM(S): at 23:07

## 2020-03-25 RX ADMIN — Medication 10 MILLIGRAM(S): at 06:46

## 2020-03-25 RX ADMIN — PANTOPRAZOLE SODIUM 40 MILLIGRAM(S): 20 TABLET, DELAYED RELEASE ORAL at 06:46

## 2020-03-25 RX ADMIN — Medication 10 MILLIGRAM(S): at 23:07

## 2020-03-25 RX ADMIN — CHLORHEXIDINE GLUCONATE 1 APPLICATION(S): 213 SOLUTION TOPICAL at 06:46

## 2020-03-25 NOTE — PROGRESS NOTE ADULT - SUBJECTIVE AND OBJECTIVE BOX
TISH SHAIKH  64y Male    CHIEF COMPLAINT:    Patient is a 64y old  Male who presents with a chief complaint of Shortness of breath/ Cough (25 Mar 2020 11:38)      INTERVAL HPI/OVERNIGHT EVENTS:    Patient seen and examined. No acute events overnight. Still requiring supplemental oxygen via nc    ROS: All other systems are negative.    Vital Signs:    T(F): 97.6 (03-25-20 @ 06:32), Max: 99.8 (03-24-20 @ 14:48)  HR: 76 (03-25-20 @ 06:32) (76 - 88)  BP: 96/54 (03-25-20 @ 06:32) (96/54 - 134/98)  RR: 18 (03-25-20 @ 06:32) (18 - 20)  SpO2: 96% (03-25-20 @ 06:32) (96% - 97%)    24 Mar 2020 07:01  -  25 Mar 2020 07:00  --------------------------------------------------------  IN: 0 mL / OUT: 900 mL / NET: -900 mL      Daily Height in cm: 157.48 (24 Mar 2020 14:41)      PHYSICAL EXAM:    GENERAL:  NAD, poor dentition  SKIN: No rashes or lesions  HEENT: Atraumatic. Normocephalic.   NECK: Supple, No JVD. No lymphadenopathy.  PULMONARY: Rhonchi bl  CARDIO: normal S1, S2. Rate and Rhythm are regular.   ABDOMEN/GI: Soft, Nontender, Nondistended; BS present  MSK:  No edema B/L LE. Contracted extremities  NEUROLOGIC: contracted  PSYCH: Alert & oriented, answers questions appropriately     Consultant(s) Notes Reviewed:  [x ] YES  [ ] NO  Care Discussed with Consultants/Other Providers [ x] YES  [ ] NO    LABS:                        12.9   4.40  )-----------( 162      ( 25 Mar 2020 07:19 )             38.4     03-25    139  |  101  |  12  ----------------------------<  95  4.1   |  25  |  0.5<L>    Ca    8.7      25 Mar 2020 07:19    TPro  6.4  /  Alb  3.5  /  TBili  0.3  /  DBili  x   /  AST  35  /  ALT  25  /  AlkPhos  70  03-25    RADIOLOGY & ADDITIONAL TESTS:  < from: CT Chest No Cont (03.24.20 @ 15:59) >  IMPRESSION:    1.  Left lower lobe perihilar and peribronchial opacity, and parenchyma atelectasis surrounding elevated diaphragmatic hernia with a chronic elevated left hemidiaphragm. Additional findings of right perihilar opacities and small volume bilateral pleural effusions. Correlate clinically for infectious process. Interstitial edema is a diagnostic consideration.    2.  No evidence of endobronchial obstruction.    < end of copied text >      Imaging or report Personally Reviewed:  [x] YES  [ ] NO  EKG reviewed: [x] YES  [ ] NO    Medications:  Standing  baclofen 10 milliGRAM(s) Oral three times a day  chlorhexidine 4% Liquid 1 Application(s) Topical <User Schedule>  enoxaparin Injectable 40 milliGRAM(s) SubCutaneous daily  pantoprazole   Suspension 40 milliGRAM(s) Oral before breakfast  PHENobarbital 64.8 milliGRAM(s) Oral three times a day    PRN Meds  acetaminophen   Tablet .. 650 milliGRAM(s) Oral every 6 hours PRN      Theresa Juarez MD  s. [phone]

## 2020-03-25 NOTE — PROGRESS NOTE ADULT - SUBJECTIVE AND OBJECTIVE BOX
HAWATISH SCHUSTER  64y, Male    All available historical data reviewed    OVERNIGHT EVENTS:  no fevers  NAD at rest    ROS:      VITALS:  T(F): 97.6, Max: 99.8 (03-24-20 @ 14:48)  HR: 76  BP: 96/54  RR: 18Vital Signs Last 24 Hrs  T(C): 36.4 (25 Mar 2020 06:32), Max: 37.7 (24 Mar 2020 14:48)  T(F): 97.6 (25 Mar 2020 06:32), Max: 99.8 (24 Mar 2020 14:48)  HR: 76 (25 Mar 2020 06:32) (76 - 88)  BP: 96/54 (25 Mar 2020 06:32) (96/54 - 134/98)  BP(mean): --  RR: 18 (25 Mar 2020 06:32) (18 - 20)  SpO2: 96% (25 Mar 2020 06:32) (96% - 97%)    TESTS & MEASUREMENTS:                        12.9   4.40  )-----------( 162      ( 25 Mar 2020 07:19 )             38.4     03-25    139  |  101  |  12  ----------------------------<  95  4.1   |  25  |  0.5<L>    Ca    8.7      25 Mar 2020 07:19    TPro  6.4  /  Alb  3.5  /  TBili  0.3  /  DBili  x   /  AST  35  /  ALT  25  /  AlkPhos  70  03-25    LIVER FUNCTIONS - ( 25 Mar 2020 07:19 )  Alb: 3.5 g/dL / Pro: 6.4 g/dL / ALK PHOS: 70 U/L / ALT: 25 U/L / AST: 35 U/L / GGT: x             Culture - Urine (collected 03-20-20 @ 20:18)  Source: .Urine Clean Catch (Midstream)  Final Report (03-22-20 @ 17:22):    >=3 organisms. Probable collection contamination.    Culture - Blood (collected 03-20-20 @ 19:00)  Source: .Blood Blood-Peripheral  Preliminary Report (03-22-20 @ 02:14):    No growth to date.    Culture - Blood (collected 03-20-20 @ 19:00)  Source: .Blood Blood-Peripheral  Preliminary Report (03-22-20 @ 02:14):    No growth to date.            RADIOLOGY & ADDITIONAL TESTS:  Personal review of radiological diagnostics performed  Echo and EKG results noted when applicable.     MEDICATIONS:  acetaminophen   Tablet .. 650 milliGRAM(s) Oral every 6 hours PRN  baclofen 10 milliGRAM(s) Oral three times a day  chlorhexidine 4% Liquid 1 Application(s) Topical <User Schedule>  enoxaparin Injectable 40 milliGRAM(s) SubCutaneous daily  pantoprazole   Suspension 40 milliGRAM(s) Oral before breakfast  PHENobarbital 64.8 milliGRAM(s) Oral three times a day      ANTIBIOTICS:

## 2020-03-25 NOTE — PROGRESS NOTE ADULT - ASSESSMENT
Patient is a 63yo male with PMH of cerebral palsy, seizure disorder, spastic paraplegia, s/p PEG tube, BPH with bladder neck stricture s/p suprapubic catheter, and history of ESBL Proteus mirabilis who presented from a group with fever and dry cough of 1 days' duration    Fever/cough - likely due to URI/bronchitis  - COVID-19- NEGATIVE 3/20/20, non covid Corona +  - Urine cx contaminated, blood cx NGTD   - CT chest with L sided opacity with b/l pleural effusions  - afebrile since last night, continue monitoring off abx  avoid IVF, will consider lasix if unable to wean off supplemental o2    #hypotension improved with IVF. monitor     #Seizure disorder  - Continue with phenobarbital 64.8mg PO TID    #Cerebral palsy  - Continue with baclofen 10mg PO TID    #PEG tube  -tube feedings    #Asthma  stable     #BPH with bladder neck stricture s/p suprapubic catheter      #Progress Note Handoff  Pending (specify): follow BP and fever curve, wean off supplemental oxygen  Disposition: group home .

## 2020-03-25 NOTE — PROGRESS NOTE ADULT - ASSESSMENT
Patient is a 63yo male with PMH of cerebral palsy, seizure disorder, spastic paraplegia, s/p PEG tube, BPH with bladder neck stricture s/p suprapubic catheter, and history of ESBL Proteus mirabilis who presented from a group with fever and dry cough of 1 days' duration, admitted for COVID-19 r/o and pna     #Fever with opacities on chest X-ray- aspiration pneumonitis most likely dx, covid-19 r/o,  still requiring O2  - COVID-19- NEGATIVE, follow-up CT Chest and ID for further recs   - Lymphopenia is chronic based on previous CBCs but not this severe. continue to monitor  - ID follow-up     #Seizure disorder  - Continue with phenobarbital 64.8mg PO TID    #Cerebral palsy  - Continue with baclofen 10mg PO TID    #S/p PEG tube  -start tube feedings  -will add 250mL free water    #Asthma-stable, no wheezing on PE  - Saturating well on nasal cannula     #BPH with bladder neck stricture s/p suprapubic catheter  - UA is grossly positive but likely due to chronic suprapubic catheter  - Follow repeat urine culture, first culture was contaminate     DVT Prophylaxis: Lovenox 40mg SQ QD   GI Prophylaxis: pantoprazole 40mg PO QD   Diet: NPO with tube feedings  Activity: OOB to chair   Code Status: Full Code

## 2020-03-25 NOTE — PROGRESS NOTE ADULT - SUBJECTIVE AND OBJECTIVE BOX
TISH SHAIKH 64y Male      Patient is a 64y old  Male who presents with a chief complaint of Shortness of breath/ Cough (24 Mar 2020 12:50)        INTERVAL HPI/OVERNIGHT EVENTS: No acute events overnight. Patient was seen and evaluated at the bedside. The patient denies pain. Vitals stable, receiving 2LNC.     PHYSICAL EXAM:  GENERAL: NAD  HEAD:  Normocephalic  EYES:  conjunctiva and sclera clear  ENMT: Moist mucous membranes  NECK: Supple  NERVOUS SYSTEM:  Alert, awake  CHEST/LUNG: Good air exchange bilaterally, no wheeze  HEART: Regular rate and rhythm        Vital Signs Last 24 Hrs  T(C): 36.4 (25 Mar 2020 06:32), Max: 37.7 (24 Mar 2020 14:48)  T(F): 97.6 (25 Mar 2020 06:32), Max: 99.8 (24 Mar 2020 14:48)  HR: 76 (25 Mar 2020 06:32) (76 - 88)  BP: 96/54 (25 Mar 2020 06:32) (96/54 - 134/98)  BP(mean): --  RR: 18 (25 Mar 2020 06:32) (18 - 20)  SpO2: 96% (25 Mar 2020 06:32) (96% - 97%)      Consultant(s) Notes Reviewed:  [X] YES  [ ] NO  Care Discussed with Consultants/Other Providers [X] YES  [ ] NO  Imaging Personally Reviewed:  [X] YES  [ ] NO      LABS:                        12.9   4.40  )-----------( 162      ( 25 Mar 2020 07:19 )             38.4     03-25    139  |  101  |  12  ----------------------------<  95  4.1   |  25  |  0.5<L>    Ca    8.7      25 Mar 2020 07:19    TPro  6.4  /  Alb  3.5  /  TBili  0.3  /  DBili  x   /  AST  35  /  ALT  25  /  AlkPhos  70  03-25          CAPILLARY BLOOD GLUCOSE

## 2020-03-25 NOTE — PROGRESS NOTE ADULT - ASSESSMENT
· Assessment		  64 y/m with PMH of cerebral palsy from group home, seizure disorder, spastic paraplegia, s/p PEG tube, BPH, bladder neck stricture s/p suprapubic cath, history of ESBL, asthma, from group home presented with fever and dry cough for 1 day. He denies any chest pain, no belly pain, change in bladder or bowel habits, tingling or numbness. In ED he was tachypneic and short of breath    IMPRESSION:  #COVID19 : NG.   #Corona ( Non COVD19 ) positive which will explain the low procal  -no bacterial PNA  -no sepsis  -BCX NG  -legionella NG  -UCx NG  -SIRS secondary to aspiration with fevers ?  -influenza/RSV NG    RECOMMENDATIONS:  -no ABx  -d/c COVID19 precautions  -recall prn please

## 2020-03-26 LAB
-  AMIKACIN: SIGNIFICANT CHANGE UP
-  AMIKACIN: SIGNIFICANT CHANGE UP
-  AMPICILLIN/SULBACTAM: SIGNIFICANT CHANGE UP
-  AMPICILLIN: SIGNIFICANT CHANGE UP
-  AZTREONAM: SIGNIFICANT CHANGE UP
-  AZTREONAM: SIGNIFICANT CHANGE UP
-  CEFAZOLIN: SIGNIFICANT CHANGE UP
-  CEFEPIME: SIGNIFICANT CHANGE UP
-  CEFEPIME: SIGNIFICANT CHANGE UP
-  CEFOXITIN: SIGNIFICANT CHANGE UP
-  CEFTAZIDIME: SIGNIFICANT CHANGE UP
-  CEFTRIAXONE: SIGNIFICANT CHANGE UP
-  CIPROFLOXACIN: SIGNIFICANT CHANGE UP
-  CIPROFLOXACIN: SIGNIFICANT CHANGE UP
-  ERTAPENEM: SIGNIFICANT CHANGE UP
-  GENTAMICIN: SIGNIFICANT CHANGE UP
-  GENTAMICIN: SIGNIFICANT CHANGE UP
-  IMIPENEM: SIGNIFICANT CHANGE UP
-  LEVOFLOXACIN: SIGNIFICANT CHANGE UP
-  LEVOFLOXACIN: SIGNIFICANT CHANGE UP
-  MEROPENEM: SIGNIFICANT CHANGE UP
-  MEROPENEM: SIGNIFICANT CHANGE UP
-  NITROFURANTOIN: SIGNIFICANT CHANGE UP
-  PIPERACILLIN/TAZOBACTAM: SIGNIFICANT CHANGE UP
-  PIPERACILLIN/TAZOBACTAM: SIGNIFICANT CHANGE UP
-  TOBRAMYCIN: SIGNIFICANT CHANGE UP
-  TOBRAMYCIN: SIGNIFICANT CHANGE UP
-  TRIMETHOPRIM/SULFAMETHOXAZOLE: SIGNIFICANT CHANGE UP
CULTURE RESULTS: SIGNIFICANT CHANGE UP
METHOD TYPE: SIGNIFICANT CHANGE UP
METHOD TYPE: SIGNIFICANT CHANGE UP
ORGANISM # SPEC MICROSCOPIC CNT: SIGNIFICANT CHANGE UP
SPECIMEN SOURCE: SIGNIFICANT CHANGE UP

## 2020-03-26 PROCEDURE — 71045 X-RAY EXAM CHEST 1 VIEW: CPT | Mod: 26

## 2020-03-26 PROCEDURE — 99232 SBSQ HOSP IP/OBS MODERATE 35: CPT

## 2020-03-26 RX ADMIN — Medication 64.8 MILLIGRAM(S): at 22:08

## 2020-03-26 RX ADMIN — Medication 5 MILLILITER(S): at 06:39

## 2020-03-26 RX ADMIN — CHLORHEXIDINE GLUCONATE 1 APPLICATION(S): 213 SOLUTION TOPICAL at 06:09

## 2020-03-26 RX ADMIN — ENOXAPARIN SODIUM 40 MILLIGRAM(S): 100 INJECTION SUBCUTANEOUS at 11:37

## 2020-03-26 RX ADMIN — Medication 10 MILLIGRAM(S): at 06:09

## 2020-03-26 RX ADMIN — Medication 5 MILLILITER(S): at 22:07

## 2020-03-26 RX ADMIN — Medication 64.8 MILLIGRAM(S): at 06:09

## 2020-03-26 RX ADMIN — Medication 10 MILLIGRAM(S): at 22:07

## 2020-03-26 RX ADMIN — Medication 5 MILLILITER(S): at 00:08

## 2020-03-26 RX ADMIN — PANTOPRAZOLE SODIUM 40 MILLIGRAM(S): 20 TABLET, DELAYED RELEASE ORAL at 06:09

## 2020-03-26 NOTE — CDI QUERY NOTE - NSCDIOTHERTXTBX_GEN_ALL_CORE_HH
DOCUMENTATION CLARIFICATION FORM     Encounter #: [id number]                                                        Patient’s Name: Jenaro Chaparro  Medical Record #: [id number]                                              Admit Date: 3-  CDI Specialist/: Manuel                                                  Contact #: [phone]    Dear Dr. Guzman                     Date: 3-                The Physician’s or Provider’s documentation of the patient’s presentation, evaluation and  medical management, as identified below, may support a diagnosis that is not documented in the medical record.  In order to accurately capture all diagnoses to the greatest degree of specificity reflecting the patient’s actual severity of illness, the documentation in this patient’s medical record requires additional clarification.  Please include more specific documentation, either known or suspected, of a corresponding diagnosis associated with the clinical information described below in your Progress Note and/or Discharge Summary.  64yoM c/o SOB Respiratory Distress DX: Aspiration Pneumonitis   3- PN- requires O2 3LNC and cannot maintain an oxygen saturation above 90% on room air    RN Flow sheet – On 3L NC with O2 Saturations 91 - 99%, Resp. rates 16-43    3- PN-Fever with opacities on chest X-ray- aspiration pneumonitis most likely dx, covid-19 r/o,  still requiring O2    Based on your clinical judgement, can you provide a diagnosis that represents the above clinical indicators?  (  ) Acute respiratory failure with hypoxia  (  ) Other (please specify)______________  (  ) Unable to clinically determine  Present on Admission:  Was the condition present on admission, if so, please document in the chart that “(the condition) was present on admission.”      Documentation clarification is required for compliance, accuracy in coding and billing, and reporting severity of illness, quality data   and risk of mortality.  --------------------------------------------------------------------------------------------------------------------------------------------  DO NOT REMOVE THIS RECORD WITHOUT FIRST NOTIFYING THE CDI SPECIALIST  This form is NOT a part of the permanent Medical Record. DOCUMENTATION CLARIFICATION FORM     Encounter #: [id number]                                                        Patient’s Name: Jenaro Chaparro  Medical Record #: [id number]                                              Admit Date: 3-  CDI Specialist/: Manuel                                                  Contact #: [phone]    Dear Dr. Juarez                                                               Date: 3-                The Physician’s or Provider’s documentation of the patient’s presentation, evaluation and  medical management, as identified below, may support a diagnosis that is not documented in the medical record.  In order to accurately capture all diagnoses to the greatest degree of specificity reflecting the patient’s actual severity of illness, the documentation in this patient’s medical record requires additional clarification.  Please include more specific documentation, either known or suspected, of a corresponding diagnosis associated with the clinical information described below in your Progress Note and/or Discharge Summary.    64yoM c/o SOB Respiratory Distress DX: Aspiration Pneumonitis   3- PN- requires O2 3LNC and cannot maintain an oxygen saturation above 90% on room air    RN Flow sheet – On 3L NC with O2 Saturations 91 - 99%, Resp. rates 16-43    3- PN-Fever with opacities on chest X-ray- aspiration pneumonitis most likely dx, covid-19 r/o,  still requiring O2    Based on your clinical judgement, can you provide a diagnosis that represents the above clinical indicators?  (  ) Acute respiratory failure with hypoxia  (  ) Other (please specify)______________  (  ) Unable to clinically determine  Present on Admission:  Was the condition present on admission, if so, please document in the chart that “(the condition) was present on admission.”      Documentation clarification is required for compliance, accuracy in coding and billing, and reporting severity of illness, quality data   and risk of mortality.  --------------------------------------------------------------------------------------------------------------------------------------------  DO NOT REMOVE THIS RECORD WITHOUT FIRST NOTIFYING THE CDI SPECIALIST  This form is NOT a part of the permanent Medical Record.

## 2020-03-26 NOTE — PROGRESS NOTE ADULT - SUBJECTIVE AND OBJECTIVE BOX
SUBJECTIVE:    Patient is a 64y old Male who presents with a chief complaint of Shortness of breath/ Cough (25 Mar 2020 11:43)    Currently admitted to medicine with the primary diagnosis of Respiratory distress     Today is hospital day 6d. This morning he is resting comfortably in bed and reports no new issues or overnight events.     INTERVAL EVENTS: Patient still has some ronchi on breathing.    PAST MEDICAL & SURGICAL HISTORY  Asthma  Urinary retention  Urinary calculi  Spastic quadriplegia  Osteoporosis  Seizure: last seizure &gt;10 years ago  Cerebral palsy  BPH (benign prostatic hyperplasia)  Suprapubic catheter  H/O cystoscopy  S/P percutaneous endoscopic gastrostomy (PEG) tube placement      ALLERGIES:  No Known Allergies    MEDICATIONS:  STANDING MEDICATIONS  baclofen 10 milliGRAM(s) Oral three times a day  chlorhexidine 4% Liquid 1 Application(s) Topical <User Schedule>  enoxaparin Injectable 40 milliGRAM(s) SubCutaneous daily  pantoprazole   Suspension 40 milliGRAM(s) Oral before breakfast  PHENobarbital 64.8 milliGRAM(s) Oral three times a day    PRN MEDICATIONS  acetaminophen   Tablet .. 650 milliGRAM(s) Oral every 6 hours PRN  guaifenesin/dextromethorphan  Syrup 5 milliLiter(s) Oral every 6 hours PRN    VITALS:   T(F): 96.7  HR: 59  BP: 88/53  RR: 18  SpO2: 96%    LABS:                        12.9   4.40  )-----------( 162      ( 25 Mar 2020 07:19 )             38.4     03-25    139  |  101  |  12  ----------------------------<  95  4.1   |  25  |  0.5<L>    Ca    8.7      25 Mar 2020 07:19    TPro  6.4  /  Alb  3.5  /  TBili  0.3  /  DBili  x   /  AST  35  /  ALT  25  /  AlkPhos  70  03-25              Culture - Blood (collected 24 Mar 2020 12:17)  Source: .Blood None  Preliminary Report (25 Mar 2020 22:01):    No growth to date.    Culture - Urine (collected 24 Mar 2020 11:18)  Source: .Urine Clean Catch (Midstream)  Preliminary Report (25 Mar 2020 16:56):    >100,000 CFU/ml Gram Negative Rods          RADIOLOGY:    PHYSICAL EXAM:  GEN: No acute distress  PULM/CHEST: Bilateral ronchi  CVS: Regular rate and rhythm, S1-S2, no murmurs  ABD: Soft, non-tender, non-distended, +BS, PEG tube in place, no erythema around the PEG  EXT: Spastic paraplegia  NEURO: AAOx3    Edouard Catheter:

## 2020-03-26 NOTE — PROGRESS NOTE ADULT - SUBJECTIVE AND OBJECTIVE BOX
TISH SHAIKH  64y Male    CHIEF COMPLAINT:    Patient is a 64y old  Male who presents with a chief complaint of Shortness of breath/ Cough (26 Mar 2020 11:01)      INTERVAL HPI/OVERNIGHT EVENTS:    Patient seen and examined. No acute events overnight. No active complaints    ROS: All other systems are negative.    Vital Signs:    T(F): 97 (03-26-20 @ 12:44), Max: 98 (03-25-20 @ 21:32)  HR: 65 (03-26-20 @ 12:44) (59 - 88)  BP: 95/55 (03-26-20 @ 12:44) (88/53 - 108/62)  RR: 18 (03-26-20 @ 12:44) (18 - 18)  SpO2: 91% (03-26-20 @ 11:59) (91% - 96%)    25 Mar 2020 07:01  -  26 Mar 2020 07:00  --------------------------------------------------------  IN: 2080 mL / OUT: 800 mL / NET: 1280 mL    PHYSICAL EXAM:    GENERAL:  NAD  SKIN: No rashes or lesions  HEENT: Atraumatic. Normocephalic.   NECK: Supple, No JVD.   PULMONARY: + congestion, Poor insipratory effort. No wheezing. No rales -  CVS: Normal S1, S2. Rate and Rhythm are regular. No murmurs.  ABDOMEN/GI: Soft, Nontender, Nondistended; + PEG site intact  MSK:  No edema B/L LE. contracted  NEUROLOGIC:  No motor or sensory deficit.  PSYCH: Alert & oriented x 3, normal affect    Consultant(s) Notes Reviewed:  [x ] YES  [ ] NO  Care Discussed with Consultants/Other Providers [ x] YES  [ ] NO    LABS:                        12.9   4.40  )-----------( 162      ( 25 Mar 2020 07:19 )             38.4     03-25    139  |  101  |  12  ----------------------------<  95  4.1   |  25  |  0.5<L>    Ca    8.7      25 Mar 2020 07:19  TPro  6.4  /  Alb  3.5  /  TBili  0.3  /  DBili  x   /  AST  35  /  ALT  25  /  AlkPhos  70  03-25    Culture - Blood (collected 24 Mar 2020 12:17)  Source: .Blood None  Preliminary Report (25 Mar 2020 22:01):    No growth to date.    Culture - Urine (collected 24 Mar 2020 11:18)  Source: .Urine Clean Catch (Midstream)  Preliminary Report (25 Mar 2020 16:56):    >100,000 CFU/ml Gram Negative Rods    RADIOLOGY & ADDITIONAL TESTS:  Imaging or report Personally Reviewed:  [x] YES  [ ] NO  EKG reviewed: [x] YES  [ ] NO    Medications:  Standing  baclofen 10 milliGRAM(s) Oral three times a day  chlorhexidine 4% Liquid 1 Application(s) Topical <User Schedule>  enoxaparin Injectable 40 milliGRAM(s) SubCutaneous daily  pantoprazole   Suspension 40 milliGRAM(s) Oral before breakfast  PHENobarbital 64.8 milliGRAM(s) Oral three times a day    PRN Meds  acetaminophen   Tablet .. 650 milliGRAM(s) Oral every 6 hours PRN  guaifenesin/dextromethorphan  Syrup 5 milliLiter(s) Oral every 6 hours PRN

## 2020-03-26 NOTE — PROGRESS NOTE ADULT - ASSESSMENT
Patient is a 63yo male with PMH of cerebral palsy, seizure disorder, spastic paraplegia, s/p PEG tube, BPH with bladder neck stricture s/p suprapubic catheter, and history of ESBL Proteus mirabilis who presented from a group with fever and dry cough of 1 days' duration, admitted for COVID-19 r/o and pna.    Patient is weaned off oxygen. Likely had bronchitis due to non COVID corona, improving. Likely discharge today or tomorrow.    # Fever with opacities on chest X-ray likely non COVID corona bronchitis  - COVID-19- NEGATIVE, Chiang non COVID positive  - Lymphopenia is chronic based on previous CBCs but not this severe    # Seizure disorder  - Continue with phenobarbital 64.8mg PO TID    # Cerebral palsy  - Continue with baclofen 10mg PO three times a day    # S/p PEG tube  - Continue tube feedings    # Asthma-stable, no wheezing on PE  - Off nasal canula, O2 sat above 90%    # BPH with bladder neck stricture s/p suprapubic catheter  - UA is grossly positive but likely due to chronic suprapubic catheter  - Repeat Cultures show >357258 GNR but asymptomatic    DVT Prophylaxis: Lovenox 40mg SQ once daily  GI Prophylaxis: pantoprazole 40mg PO once daily  Diet: NPO with tube feedings  Activity: OOB to chair   Code Status: Full Code Patient is a 65yo male with PMH of cerebral palsy, seizure disorder, spastic paraplegia, s/p PEG tube, BPH with bladder neck stricture s/p suprapubic catheter, and history of ESBL Proteus mirabilis who presented from a group with fever and dry cough of 1 days' duration, admitted for COVID-19 r/o and pna.    Patient is weaned off oxygen saturating 92%. Likely had bronchitis due to non COVID corona, improving. Likely discharge tomorrow. Patients culture positive for Gram negative rods likely colonization as asymptomatic bacteruria. Discussed case with Dr. Hawk who recommended no further treatment for the patient.    # Fever with opacities on chest X-ray likely non COVID corona bronchitis  - COVID-19- NEGATIVE, Chiang non COVID positive  - Lymphopenia is chronic based on previous CBCs but not this severe    # Seizure disorder  - Continue with phenobarbital 64.8mg PO TID    # Cerebral palsy  - Continue with baclofen 10mg PO three times a day    # S/p PEG tube  - Continue tube feedings    # Asthma-stable, no wheezing on PE  - Off nasal canula, O2 sat above 90%    # BPH with bladder neck stricture s/p suprapubic catheter  - UA is grossly positive but likely due to chronic suprapubic catheter  - Repeat Cultures show >127857 GNR but asymptomatic    # Asymptomatic Bacteruria  - Culture grows gram negative rods  - Positive for Proteus ESBL on last admission  - Patient asymptomatic  - Spoke with Dr. Hawk, no treatment as per him     DVT Prophylaxis: Lovenox 40mg SQ once daily  GI Prophylaxis: pantoprazole 40mg PO once daily  Diet: NPO with tube feedings  Activity: OOB to chair   Code Status: Full Code

## 2020-03-26 NOTE — CHART NOTE - NSCHARTNOTEFT_GEN_A_CORE
Registered Dietitian Follow-Up     Patient Profile Reviewed                           Yes [x]   No []     Nutrition History Previously Obtained        Yes []  No [x] unable to reach emergency contact. no note of TF regimen in paperchart.      Pertinent Subjective Information:  -Spoke to RN who reports pt tolerating TF regimen well. No signs of intolerance, having regular BMs. No further nutrition interventions at this time.      Pertinent Medical Interventions:  1. Fever with opacities on chest X-ray   --COVID-19 negative; non COVID-19 corona positive      Diet order: Jevity 1.2 @ 360mL q8H + 1pack/day Prosource TF (1336kcal, 71g protein, 875mL free H2O)      Anthropometrics:  - Ht. 157.48cm   - Wt. 55.3kg (3/22) vs 54.6kg admit wt   - BMI  - IBW     Pertinent Lab Data: (3/25/2020) WBC 4.40, RBC 4.08, H/H 12.9/38.4, Cr 0.5      Pertinent Meds: loveonx, protonix      Physical Findings:  - Appearance:   - GI function: fecal incontinence with LBM 3/24.   - Tubes: PEG   - Oral/Mouth cavity: NPO w  - Skin: intact. no edema      Nutrition Requirements  Weight Used: 54.6kg (continued from RD initial assessment)      estimated calorie needs = 4726-9757 kcal/day (MSJ x1.2-1.3).   estimated protein needs = 55-66 g/day (1.0-1.2 g/kg CBW).   estimated fluid needs = 1mL/kcal     Nutrient Intake: TF meeting 85%kcal and 105% protein needs     [] Previous Nutrition Diagnosis: inadequate enteral nutrition infusion             [] Ongoing          [x] Resolved    [x] No active nutrition diagnosis identified at this time     Nutrition Intervention: enteral nutrition   Recommend  1. Continue TF order of Jevity 1.2 @ 360mL q8H +1 pack/day Prosource TF.   --TF at goal rate provides 1336kcal, 70g protein, 875mL free H2O and 100% RDIs. Recommend 75 cc pre/post flushes or per LIP. Maintain aspiration precautions.     Goal/Expected Outcome: TF to continue to provide  >85% but <105% estimated needs throughout LOS. Reassess in 7 days.      Indicator/Monitoring: RD will monitor diet order ,energy intake, nutrition related labs, body composition, NFPF (TF tolerance) Registered Dietitian Follow-Up     Patient Profile Reviewed                           Yes [x]   No []     Nutrition History Previously Obtained        Yes []  No [x] unable to reach emergency contact. no note of TF regimen in paperchart.      Pertinent Subjective Information:  -Spoke to RN who reports pt tolerating TF regimen well. No signs of intolerance, having regular BMs. No further nutrition interventions at this time.      Pertinent Medical Interventions:  1. Fever with opacities on chest X-ray   --COVID-19 negative; non COVID-19 corona positive      Diet order: Jevity 1.2 @ 360mL q8H + 1pack/day Prosource TF (1336kcal, 71g protein, 875mL free H2O)      Anthropometrics:  - Ht. 157.48cm   - Wt. 55.3kg (3/22) vs 54.6kg admit wt   - BMI  - IBW     Pertinent Lab Data: (3/25/2020) WBC 4.40, RBC 4.08, H/H 12.9/38.4, Cr 0.5      Pertinent Meds: loveonx, protonix      Physical Findings:  - Appearance: sleeping soundly   - GI function: fecal incontinence with LBM 3/24.   - Tubes: PEG   - Oral/Mouth cavity: NPO w  - Skin: intact. no edema      Nutrition Requirements  Weight Used: 54.6kg (continued from RD initial assessment)      estimated calorie needs = 1035-0910 kcal/day (MSJ x1.2-1.3).   estimated protein needs = 55-66 g/day (1.0-1.2 g/kg CBW).   estimated fluid needs = 1mL/kcal     Nutrient Intake: TF meeting 85%kcal and 105% protein needs     [] Previous Nutrition Diagnosis: inadequate enteral nutrition infusion             [] Ongoing          [x] Resolved    [x] No active nutrition diagnosis identified at this time     Nutrition Intervention: enteral nutrition   Recommend  1. Continue TF order of Jevity 1.2 @ 360mL q8H +1 pack/day Prosource TF.   --TF at goal rate provides 1336kcal, 70g protein, 875mL free H2O and 100% RDIs. Recommend 75 cc pre/post flushes or per LIP. Maintain aspiration precautions.     Goal/Expected Outcome: TF to continue to provide  >85% but <105% estimated needs throughout LOS. Reassess in 7 days.      Indicator/Monitoring: RD will monitor diet order ,energy intake, nutrition related labs, body composition, NFPF (TF tolerance)

## 2020-03-26 NOTE — PROGRESS NOTE ADULT - ASSESSMENT
Patient is a 65yo male with PMH of cerebral palsy, seizure disorder, spastic paraplegia, s/p PEG tube, BPH with bladder neck stricture s/p suprapubic catheter, and history of ESBL Proteus mirabilis who presented from a group with fever and dry cough of 1 days' duration    Fever/cough - likely due to URI/bronchitis  - COVID-19- NEGATIVE x2, non covid Corona +  - Urine cx now with >100K GNR  - CT chest with L sided opacity with b/l pleural effusions, xray from today with improved congestion  - afebrile  - taper off supplemental oxygen  - f/u urine sensitivities    hypotension improved with IVF. monitor     Seizure disorder  - Continue with phenobarbital 64.8mg PO TID    Cerebral palsy  - Continue with baclofen 10mg PO TID    PEG tube  continue with feeds      #Progress Note Handoff  Pending (specify): Urine sensitivities  Family discussion: lab findings and urine studies, as well as discharge planning discussed with patient, aware and agreeable with plan  Disposition: group home .

## 2020-03-27 ENCOUNTER — TRANSCRIPTION ENCOUNTER (OUTPATIENT)
Age: 65
End: 2020-03-27

## 2020-03-27 VITALS
RESPIRATION RATE: 18 BRPM | HEART RATE: 76 BPM | DIASTOLIC BLOOD PRESSURE: 54 MMHG | TEMPERATURE: 98 F | SYSTOLIC BLOOD PRESSURE: 97 MMHG

## 2020-03-27 LAB
ALBUMIN SERPL ELPH-MCNC: 3.4 G/DL — LOW (ref 3.5–5.2)
ALP SERPL-CCNC: 76 U/L — SIGNIFICANT CHANGE UP (ref 30–115)
ALT FLD-CCNC: 24 U/L — SIGNIFICANT CHANGE UP (ref 0–41)
ANION GAP SERPL CALC-SCNC: 10 MMOL/L — SIGNIFICANT CHANGE UP (ref 7–14)
AST SERPL-CCNC: 27 U/L — SIGNIFICANT CHANGE UP (ref 0–41)
BASOPHILS # BLD AUTO: 0.01 K/UL — SIGNIFICANT CHANGE UP (ref 0–0.2)
BASOPHILS NFR BLD AUTO: 0.3 % — SIGNIFICANT CHANGE UP (ref 0–1)
BILIRUB SERPL-MCNC: 0.2 MG/DL — SIGNIFICANT CHANGE UP (ref 0.2–1.2)
BUN SERPL-MCNC: 15 MG/DL — SIGNIFICANT CHANGE UP (ref 10–20)
CALCIUM SERPL-MCNC: 8.5 MG/DL — SIGNIFICANT CHANGE UP (ref 8.5–10.1)
CHLORIDE SERPL-SCNC: 102 MMOL/L — SIGNIFICANT CHANGE UP (ref 98–110)
CO2 SERPL-SCNC: 28 MMOL/L — SIGNIFICANT CHANGE UP (ref 17–32)
CREAT SERPL-MCNC: 0.5 MG/DL — LOW (ref 0.7–1.5)
EOSINOPHIL # BLD AUTO: 0.14 K/UL — SIGNIFICANT CHANGE UP (ref 0–0.7)
EOSINOPHIL NFR BLD AUTO: 3.7 % — SIGNIFICANT CHANGE UP (ref 0–8)
GLUCOSE SERPL-MCNC: 129 MG/DL — HIGH (ref 70–99)
HCT VFR BLD CALC: 35.4 % — LOW (ref 42–52)
HGB BLD-MCNC: 12.5 G/DL — LOW (ref 14–18)
IMM GRANULOCYTES NFR BLD AUTO: 0.3 % — SIGNIFICANT CHANGE UP (ref 0.1–0.3)
LYMPHOCYTES # BLD AUTO: 1.11 K/UL — LOW (ref 1.2–3.4)
LYMPHOCYTES # BLD AUTO: 29.4 % — SIGNIFICANT CHANGE UP (ref 20.5–51.1)
MAGNESIUM SERPL-MCNC: 2 MG/DL — SIGNIFICANT CHANGE UP (ref 1.8–2.4)
MCHC RBC-ENTMCNC: 32.9 PG — HIGH (ref 27–31)
MCHC RBC-ENTMCNC: 35.3 G/DL — SIGNIFICANT CHANGE UP (ref 32–37)
MCV RBC AUTO: 93.2 FL — SIGNIFICANT CHANGE UP (ref 80–94)
MONOCYTES # BLD AUTO: 0.59 K/UL — SIGNIFICANT CHANGE UP (ref 0.1–0.6)
MONOCYTES NFR BLD AUTO: 15.6 % — HIGH (ref 1.7–9.3)
NEUTROPHILS # BLD AUTO: 1.91 K/UL — SIGNIFICANT CHANGE UP (ref 1.4–6.5)
NEUTROPHILS NFR BLD AUTO: 50.7 % — SIGNIFICANT CHANGE UP (ref 42.2–75.2)
NRBC # BLD: 0 /100 WBCS — SIGNIFICANT CHANGE UP (ref 0–0)
PLATELET # BLD AUTO: 165 K/UL — SIGNIFICANT CHANGE UP (ref 130–400)
POTASSIUM SERPL-MCNC: 3.9 MMOL/L — SIGNIFICANT CHANGE UP (ref 3.5–5)
POTASSIUM SERPL-SCNC: 3.9 MMOL/L — SIGNIFICANT CHANGE UP (ref 3.5–5)
PROT SERPL-MCNC: 6.3 G/DL — SIGNIFICANT CHANGE UP (ref 6–8)
RBC # BLD: 3.8 M/UL — LOW (ref 4.7–6.1)
RBC # FLD: 12.8 % — SIGNIFICANT CHANGE UP (ref 11.5–14.5)
SODIUM SERPL-SCNC: 140 MMOL/L — SIGNIFICANT CHANGE UP (ref 135–146)
WBC # BLD: 3.77 K/UL — LOW (ref 4.8–10.8)
WBC # FLD AUTO: 3.77 K/UL — LOW (ref 4.8–10.8)

## 2020-03-27 PROCEDURE — 99239 HOSP IP/OBS DSCHRG MGMT >30: CPT

## 2020-03-27 RX ORDER — ALBUTEROL 90 UG/1
3 AEROSOL, METERED ORAL
Qty: 0 | Refills: 0 | DISCHARGE

## 2020-03-27 RX ORDER — GUAIFENESIN/DEXTROMETHORPHAN 600MG-30MG
5 TABLET, EXTENDED RELEASE 12 HR ORAL
Qty: 0 | Refills: 0 | DISCHARGE
Start: 2020-03-27

## 2020-03-27 RX ADMIN — Medication 64.8 MILLIGRAM(S): at 06:00

## 2020-03-27 RX ADMIN — Medication 10 MILLIGRAM(S): at 06:00

## 2020-03-27 RX ADMIN — PANTOPRAZOLE SODIUM 40 MILLIGRAM(S): 20 TABLET, DELAYED RELEASE ORAL at 06:00

## 2020-03-27 RX ADMIN — Medication 5 MILLILITER(S): at 06:00

## 2020-03-27 NOTE — DISCHARGE NOTE PROVIDER - NSDCFUADDINST_GEN_ALL_CORE_FT
Please use incentive spirometer daily at least 7-8 times per hour.    Please follow with primary in one week.

## 2020-03-27 NOTE — DISCHARGE NOTE PROVIDER - HOSPITAL COURSE
HPI:    64 y/m with PMH of cerebral palsy from group home, seizure disorder, spastic paraplegia, s/p PEG tube, BPH, bladder neck stricture s/p suprapubic cath, history of ESBL, asthma, from group home presented with fever and dry cough for 1 day. He denies any chest pain, no belly pain, change in bladder or bowel habits, tingling or numbness. In ED he was tachypneic and short of breath. CXR showed possible left lung pneumonia, also had a fever in ED. (20 Mar 2020 21:25)        Patient was initially kept in ICU due to breathing issues. Patient was requiring supplemental oxygen. Testing for COVID and RVP was sent. Patient was negative for COVID but positive for non-covid 19 corono virus likely causing acute bronchitis. Patient was weaned off oxygen saturating 91-93%. Likely had bronchitis due to non COVID corona. Patient was downgraded to the medical floor once he improved. Patients urine culture positive for Pseudomonas and Proteus ESBL. As per ID likely colonization as it is asymptomatic bacteruria. Discussed case with Dr. Hawk who recommended no further treatment for the patient. Patient does not need supplemental oxygen and does not require suctioning and will be discharged today to [facility].

## 2020-03-27 NOTE — PROGRESS NOTE ADULT - ATTENDING COMMENTS
Patient is a 63yo male with PMH of cerebral palsy, seizure disorder, spastic paraplegia, s/p PEG tube, BPH with bladder neck stricture s/p suprapubic catheter, and history of ESBL Proteus mirabilis who presented from a group with fever and dry cough of 1 days' duration,     #Fever  again overnight 101 now with low WBC  - COVID-19- NEGATIVE 3/20/20   - Lymphopenia noted on prebious cbcs   - fllow urine culture. blood culture   - hold abx per ID for now   - asked ID DR Garduno for possible retesting for COVID-19 wants first to get CT chest non contrast and if any CT changes to suggest COVID needs to retest  -repeat chest xray shows improvement with interstitial edema       #hypotension improved with IVF. monitor     #Seizure disorder  - Continue with phenobarbital 64.8mg PO TID    #Cerebral palsy  - Continue with baclofen 10mg PO TID    #PEG tube  -tube feedings    #Asthma  stable     #BPH with bladder neck stricture s/p suprapubic catheter      #Progress Note Handoff  Pending (specify): follow BP and fever curve. CT chest   Family discussion:patient   Disposition: group home .
patient seen and examined independently    A/P:  Patient is a 65yo male with PMH of cerebral palsy, seizure disorder, spastic paraplegia, s/p PEG tube, BPH with bladder neck stricture s/p suprapubic catheter, and history of ESBL Proteus mirabilis who presented from a group with fever and dry cough of 1 days' duration,     #Fever with opacities on chest X-ray- aspiration pneumonitis  - COVID-19- NEGATIVE  - Lymphopenia is chronic based on previous CBCs but not this severe  -had 100.1 fever this morning. monitor off abx   -also per ID no abx     #hypotension: was given IVF NS and improved monitor BP     #Seizure disorder  - Continue with phenobarbital 64.8mg PO TID    #Cerebral palsy  - Continue with baclofen 10mg PO TID    #PEG tube  -tube feedings  -will add 250mL free water    #Asthma  stable     #BPH with bladder neck stricture s/p suprapubic catheter  - urine culture contaminated       #Progress Note Handoff  Pending (specify): follow BP and fever curve. check o2 sats on room air   Family discussion:patient   Disposition: group home
Patient is seen and examined independently at bedside. I agree with the resident's note, history and plan as above. Corrections made where appropriate    All labs, radiologic studies, vitals, orders and medications list reviewed.     Patient feels well and is medically optimized for discharge. Patient and group home in agreement with the plan

## 2020-03-27 NOTE — PROGRESS NOTE ADULT - ASSESSMENT
Patient is a 65yo male with PMH of cerebral palsy, seizure disorder, spastic paraplegia, s/p PEG tube, BPH with bladder neck stricture s/p suprapubic catheter, and history of ESBL Proteus mirabilis who presented from a group with fever and dry cough of 1 days' duration, admitted for COVID-19 r/o and pna.    Patient is weaned off oxygen saturating 91-93%. Likely had bronchitis due to non COVID corona, improving. Likely discharge today. Patients culture positive for Pseudomonas and Proteus ESBL. As per ID likely colonization as it is asymptomatic bacteruria. Discussed case with Dr. Hawk who recommended no further treatment for the patient.    # Fever with opacities on chest X-ray likely non COVID corona bronchitis  - COVID-19- NEGATIVE, Chiang non COVID positive  - Lymphopenia is chronic based on previous CBCs  - No neutrophilia on the CBC now    # Seizure disorder  - Continue with phenobarbital 64.8mg PO three times a day    # Cerebral palsy  - Continue with baclofen 10mg PO three times a day    # S/p PEG tube  - Continue tube feedings    # Asthma-stable, no wheezing on PE  - Off nasal canula, O2 sat 91-93%    # BPH with bladder neck stricture s/p suprapubic catheter  - UA is grossly positive but likely due to chronic suprapubic catheter  - Repeat Cultures show >[id number] GNR but asymptomatic    # Asymptomatic Bacteruria  - Culture grows gram negative rods  - Positive for Proteus ESBL on last admission  - Patient asymptomatic  - Spoke with Dr. Hawk, no treatment as per him     DVT Prophylaxis: Lovenox 40mg SQ once daily  GI Prophylaxis: pantoprazole 40mg PO once daily  Diet: NPO with tube feedings  Activity: OOB to chair   Code Status: Full Code Patient is a 63yo male with PMH of cerebral palsy, seizure disorder, spastic paraplegia, s/p PEG tube, BPH with bladder neck stricture s/p suprapubic catheter, and history of ESBL Proteus mirabilis who presented from a group with fever and dry cough of 1 days' duration, admitted for COVID-19 r/o and pna.    Patient is weaned off oxygen saturating 91-93%. Likely had bronchitis due to non COVID corona, improving. Likely discharge today. Patients culture positive for Pseudomonas and Proteus ESBL. As per ID likely colonization as it is asymptomatic bacteruria. Discussed case with Dr. Hawk who recommended no further treatment for the patient.    # Fever with opacities on chest X-ray likely non COVID corona bronchitis  - COVID-19- NEGATIVE, Chiang non COVID positive  - Lymphopenia is chronic based on previous CBCs  - No neutrophilia on the CBC now    # Seizure disorder  - Continue with phenobarbital 64.8mg PO three times a day    # Cerebral palsy  - Continue with baclofen 10mg PO three times a day    # S/p PEG tube  - Continue tube feedings    # Asthma-stable, no wheezing on PE  - Off nasal canula, O2 sat 91-93%    # BPH with bladder neck stricture s/p suprapubic catheter  - UA is grossly positive but likely due to chronic suprapubic catheter  - Repeat Cultures show >[id number] GNR but asymptomatic    # Asymptomatic Bacteruria  - Culture grows gram negative rods  - Positive for Proteus ESBL on last admission  - Patient asymptomatic  - Spoke with Dr. Hawk, no treatment as per him     DVT Prophylaxis: Lovenox 40mg SQ once daily  GI Prophylaxis: pantoprazole 40mg PO once daily  Diet: NPO with tube feedings  Activity: OOB to chair   Code Status: Full Code  Dispo: Discharge to Group home today Patient is a 65yo male with PMH of cerebral palsy, seizure disorder, spastic paraplegia, s/p PEG tube, BPH with bladder neck stricture s/p suprapubic catheter, and history of ESBL Proteus mirabilis who presented from a group with fever and dry cough of 1 days' duration, admitted for COVID-19 r/o and pna.    Patient is weaned off oxygen saturating 91-93%. Likely had bronchitis due to non COVID corona, improving. Likely discharge today. Patients culture positive for Pseudomonas and Proteus ESBL. As per ID likely colonization as it is asymptomatic bacteruria. Discussed case with Dr. Hawk who recommended no further treatment for the patient. Patient does not need supplemental oxygen and does not require suctioning.    # Fever with opacities on chest X-ray likely non COVID corona bronchitis  - COVID-19- NEGATIVE, Chiang non COVID positive  - Lymphopenia is chronic based on previous CBCs  - No neutrophilia on the CBC now    # Seizure disorder  - Continue with phenobarbital 64.8mg PO three times a day    # Cerebral palsy  - Continue with baclofen 10mg PO three times a day    # S/p PEG tube  - Continue tube feedings    # Asthma-stable, no wheezing on PE  - Off nasal canula, O2 sat 93%    # BPH with bladder neck stricture s/p suprapubic catheter  - UA is grossly positive but likely due to chronic suprapubic catheter  - Repeat Cultures show >[id number] GNR but asymptomatic    # Asymptomatic Bacteruria  - Culture grows gram negative rods  - Positive for Proteus ESBL on last admission  - Patient asymptomatic  - Spoke with Dr. Hawk, no treatment as per him     DVT Prophylaxis: Lovenox 40mg SQ once daily  GI Prophylaxis: pantoprazole 40mg PO once daily  Diet: NPO with tube feedings  Activity: OOB to chair   Code Status: Full Code  Dispo: Discharge to Group home today Patient is a 65yo male with PMH of cerebral palsy, seizure disorder, spastic paraplegia, s/p PEG tube, BPH with bladder neck stricture s/p suprapubic catheter, and history of ESBL Proteus mirabilis who presented from a group with fever and dry cough of 1 days' duration, admitted for COVID-19 r/o and pna.    Patient is weaned off oxygen saturating 91-93%. Likely had bronchitis due to non COVID corona, improving. Likely discharge today. Patients culture positive for Pseudomonas and Proteus ESBL. As per ID likely colonization as it is asymptomatic bacteruria. Discussed case with Dr. Hawk who recommended no further treatment for the patient. Patient does not need supplemental oxygen and does not require suctioning.    # Fever with opacities on chest X-ray likely non COVID corona bronchitis  - COVID-19- NEGATIVE, Coronavirus non COVID positive  - Lymphopenia is chronic based on previous CBCs  - No neutrophilia on the CBC now    # Seizure disorder  - Continue with phenobarbital 64.8mg PO three times a day    # Cerebral palsy  - Continue with baclofen 10mg PO three times a day    # S/p PEG tube  - Continue tube feedings    # Asthma-stable, no wheezing on PE  - Off nasal canula, O2 sat 93%    # BPH with bladder neck stricture s/p suprapubic catheter  - UA is grossly positive but likely due to chronic suprapubic catheter  - Repeat Cultures show >[id number] GNR but asymptomatic    # Asymptomatic Bacteruria  - Culture grows gram negative rods  - Positive for Proteus ESBL on last admission  - Patient asymptomatic  - Spoke with Dr. Hawk, no treatment as per him     DVT Prophylaxis: Lovenox 40mg SQ once daily  GI Prophylaxis: pantoprazole 40mg PO once daily  Diet: NPO with tube feedings  Activity: OOB to chair   Code Status: Full Code  Dispo: Discharge to Group home today

## 2020-03-27 NOTE — DISCHARGE NOTE PROVIDER - NSDCCPCAREPLAN_GEN_ALL_CORE_FT
PRINCIPAL DISCHARGE DIAGNOSIS  Diagnosis: Bronchitis, acute  Assessment and Plan of Treatment: Patient presented with shortness of breath and fever. In ED was found to have low oxygen levels. Patient was moved to the ICU. Patient was started on antibiotics and Infectious Disease was consulted. Patient was requiring supplemental oxygen. Testing for COVID and RVP was sent. Patient was negative for COVID but positive for non-covid 19 corono virus likely causing acute bronchitis. Antibiotics were discontinued as per Infectious Disease recommendations. Once improved patient was moved to a regular medical floor.  Patient was titrated off oxygen saturating 93% on room air, requiring no suctioning. Patient is medically clear for discharge and will be discharged to group home today      SECONDARY DISCHARGE DIAGNOSES  Diagnosis: Asymptomatic bacteriuria  Assessment and Plan of Treatment: Patients urine culture positive for Pseudomonas and Proteus ESBL. As per Infectious Disease likely colonization of urinary tracts by these bugs as it is asymptomatic bacteruria. Infectious Disease recommended no further treatment for the patient.

## 2020-03-27 NOTE — PROGRESS NOTE ADULT - SUBJECTIVE AND OBJECTIVE BOX
SUBJECTIVE:    Patient is a 64y old Male who presents with a chief complaint of Shortness of breath/ Cough (26 Mar 2020 13:42)    Currently admitted to medicine with the primary diagnosis of Respiratory distress     Today is hospital day 7d. This morning he is resting comfortably in bed and reports no new issues or overnight events.     INTERVAL EVENTS: Patient has no active complaints.    PAST MEDICAL & SURGICAL HISTORY  Asthma  Urinary retention  Urinary calculi  Spastic quadriplegia  Osteoporosis  Seizure: last seizure &gt;10 years ago  Cerebral palsy  BPH (benign prostatic hyperplasia)  Suprapubic catheter  H/O cystoscopy  S/P percutaneous endoscopic gastrostomy (PEG) tube placement      ALLERGIES:  No Known Allergies    MEDICATIONS:  STANDING MEDICATIONS  baclofen 10 milliGRAM(s) Oral three times a day  chlorhexidine 4% Liquid 1 Application(s) Topical <User Schedule>  enoxaparin Injectable 40 milliGRAM(s) SubCutaneous daily  pantoprazole   Suspension 40 milliGRAM(s) Oral before breakfast  PHENobarbital 64.8 milliGRAM(s) Oral three times a day    PRN MEDICATIONS  acetaminophen   Tablet .. 650 milliGRAM(s) Oral every 6 hours PRN  guaifenesin/dextromethorphan  Syrup 5 milliLiter(s) Oral every 6 hours PRN    VITALS:   T(F): 97.5  HR: 69  BP: 103/51  RR: 18  SpO2: 93%    LABS:                        12.9   4.40  )-----------( 162      ( 25 Mar 2020 07:19 )             38.4     03-25    139  |  101  |  12  ----------------------------<  95  4.1   |  25  |  0.5<L>    Ca    8.7      25 Mar 2020 07:19    TPro  6.4  /  Alb  3.5  /  TBili  0.3  /  DBili  x   /  AST  35  /  ALT  25  /  AlkPhos  70  03-25              Culture - Blood (collected 24 Mar 2020 12:17)  Source: .Blood None  Preliminary Report (25 Mar 2020 22:01):    No growth to date.    Culture - Urine (collected 24 Mar 2020 11:18)  Source: .Urine Clean Catch (Midstream)  Final Report (26 Mar 2020 18:58):    >100,000 CFU/ml Pseudomonas aeruginosa    50,000 - 99,000 CFU/mL Proteus mirabilis ESBL  Organism: Pseudomonas aeruginosa  Proteus mirabilis ESBL (26 Mar 2020 18:58)  Organism: Proteus mirabilis ESBL (26 Mar 2020 18:58)  Organism: Pseudomonas aeruginosa (26 Mar 2020 18:58)          RADIOLOGY:    PHYSICAL EXAM:  GEN: No acute distress  PULM/CHEST: Bilateral ronchi  CVS: Regular rate and rhythm, S1-S2, no murmurs  ABD: Soft, non-tender, non-distended, +BS, PEG tube in place, no erythema around the PEG  EXT: Spastic paraplegia  NEURO: AAOx3    Edouard Catheter: SUBJECTIVE:    Patient is a 64y old Male who presents with a chief complaint of Shortness of breath/ Cough (26 Mar 2020 13:42)    Currently admitted to medicine with the primary diagnosis of Respiratory distress     Today is hospital day 7d. This morning he is resting comfortably in bed and reports no new issues or overnight events.     INTERVAL EVENTS: Patient has no active complaints.    PAST MEDICAL & SURGICAL HISTORY  Asthma  Urinary retention  Urinary calculi  Spastic quadriplegia  Osteoporosis  Seizure: last seizure &gt;10 years ago  Cerebral palsy  BPH (benign prostatic hyperplasia)  Suprapubic catheter  H/O cystoscopy  S/P percutaneous endoscopic gastrostomy (PEG) tube placement      ALLERGIES:  No Known Allergies    MEDICATIONS:  STANDING MEDICATIONS  baclofen 10 milliGRAM(s) Oral three times a day  chlorhexidine 4% Liquid 1 Application(s) Topical <User Schedule>  enoxaparin Injectable 40 milliGRAM(s) SubCutaneous daily  pantoprazole   Suspension 40 milliGRAM(s) Oral before breakfast  PHENobarbital 64.8 milliGRAM(s) Oral three times a day    PRN MEDICATIONS  acetaminophen   Tablet .. 650 milliGRAM(s) Oral every 6 hours PRN  guaifenesin/dextromethorphan  Syrup 5 milliLiter(s) Oral every 6 hours PRN    VITALS:   T(F): 97.5  HR: 69  BP: 103/51  RR: 18  SpO2: 93%    LABS:                        12.9   4.40  )-----------( 162      ( 25 Mar 2020 07:19 )             38.4     03-25    139  |  101  |  12  ----------------------------<  95  4.1   |  25  |  0.5<L>    Ca    8.7      25 Mar 2020 07:19    TPro  6.4  /  Alb  3.5  /  TBili  0.3  /  DBili  x   /  AST  35  /  ALT  25  /  AlkPhos  70  03-25    Culture - Blood (collected 24 Mar 2020 12:17)  Source: .Blood None  Preliminary Report (25 Mar 2020 22:01):    No growth to date.    Culture - Urine (collected 24 Mar 2020 11:18)  Source: .Urine Clean Catch (Midstream)  Final Report (26 Mar 2020 18:58):    >100,000 CFU/ml Pseudomonas aeruginosa    50,000 - 99,000 CFU/mL Proteus mirabilis ESBL  Organism: Pseudomonas aeruginosa  Proteus mirabilis ESBL (26 Mar 2020 18:58)  Organism: Proteus mirabilis ESBL (26 Mar 2020 18:58)  Organism: Pseudomonas aeruginosa (26 Mar 2020 18:58)    RADIOLOGY:    PHYSICAL EXAM:  GEN: No acute distress  PULM/CHEST: Bilateral rhonchi  CVS: Regular rate and rhythm, S1-S2, no murmurs  ABD: Soft, non-tender, non-distended, +BS, PEG tube in place, no erythema around the PEG  EXT: contracted extremities  NEURO: AAOx3    Edouard Catheter: yes

## 2020-03-27 NOTE — DISCHARGE NOTE PROVIDER - NSDCMRMEDTOKEN_GEN_ALL_CORE_FT
Artificial Tears ophthalmic solution: 1 drop(s) to each affected eye 4 times a day  baclofen 10 mg oral tablet: 1 tab(s) by gastrostomy tube 3 times a day  docusate sodium 60 mg/15 mL oral syrup: 100 milligram(s) by gastrostomy tube once a day, As Needed for constipation  guaifenesin-dextromethorphan 100 mg-10 mg/5 mL oral liquid: 5 milliliter(s) orally every 6 hours, As needed, Cough  MiraLax oral powder for reconstitution: 17 gram(s) by gastrostomy tube once a day   Oysco 500 (1250 mg calcium carbonate) oral tablet: 1 tab(s) by gastrostomy tube 2 times a day  PHENobarbital 64.8 mg oral tablet: 1 tab(s) orally once a day via G tube  senna oral tablet: 2 tab(s) orally once a day (at bedtime), As Needed -for constipation  via G tube   Ventolin HFA 90 mcg/inh inhalation aerosol: 2 puff(s) inhaled 4 times a day, As Needed

## 2020-03-27 NOTE — PROGRESS NOTE ADULT - REASON FOR ADMISSION
Shortness of breath/ Cough

## 2020-03-27 NOTE — DISCHARGE NOTE NURSING/CASE MANAGEMENT/SOCIAL WORK - PATIENT PORTAL LINK FT
You can access the FollowMyHealth Patient Portal offered by [facility] by registering at the following website: [email or web address]. By joining [employer]’s FollowMyHealth portal, you will also be able to view your health information using other applications (apps) compatible with our system.

## 2020-03-29 LAB
CULTURE RESULTS: SIGNIFICANT CHANGE UP
SPECIMEN SOURCE: SIGNIFICANT CHANGE UP

## 2020-04-06 ENCOUNTER — APPOINTMENT (OUTPATIENT)
Dept: UROLOGY | Facility: CLINIC | Age: 65
End: 2020-04-06

## 2020-04-16 ENCOUNTER — APPOINTMENT (OUTPATIENT)
Dept: UROLOGY | Facility: CLINIC | Age: 65
End: 2020-04-16
Payer: MEDICARE

## 2020-04-16 VITALS
WEIGHT: 180 LBS | HEART RATE: 55 BPM | BODY MASS INDEX: 29.99 KG/M2 | DIASTOLIC BLOOD PRESSURE: 60 MMHG | SYSTOLIC BLOOD PRESSURE: 110 MMHG | TEMPERATURE: 97.6 F | HEIGHT: 65 IN

## 2020-04-16 PROCEDURE — 51705 CHANGE OF BLADDER TUBE: CPT

## 2020-05-07 ENCOUNTER — APPOINTMENT (OUTPATIENT)
Dept: UROLOGY | Facility: CLINIC | Age: 65
End: 2020-05-07
Payer: MEDICARE

## 2020-05-07 PROCEDURE — 51705 CHANGE OF BLADDER TUBE: CPT

## 2020-05-13 ENCOUNTER — APPOINTMENT (OUTPATIENT)
Dept: UROLOGY | Facility: CLINIC | Age: 65
End: 2020-05-13

## 2020-05-18 ENCOUNTER — APPOINTMENT (OUTPATIENT)
Dept: UROLOGY | Facility: CLINIC | Age: 65
End: 2020-05-18
Payer: MEDICARE

## 2020-05-18 PROCEDURE — 51702 INSERT TEMP BLADDER CATH: CPT

## 2020-05-19 ENCOUNTER — INPATIENT (INPATIENT)
Facility: HOSPITAL | Age: 65
LOS: 21 days | Discharge: NURSING, REHAB OR LONG-TERM CARE | End: 2020-06-10
Attending: INTERNAL MEDICINE | Admitting: INTERNAL MEDICINE
Payer: MEDICARE

## 2020-05-19 VITALS
DIASTOLIC BLOOD PRESSURE: 67 MMHG | SYSTOLIC BLOOD PRESSURE: 135 MMHG | OXYGEN SATURATION: 94 % | HEART RATE: 119 BPM | TEMPERATURE: 98 F | RESPIRATION RATE: 18 BRPM

## 2020-05-19 DIAGNOSIS — Z93.1 GASTROSTOMY STATUS: Chronic | ICD-10-CM

## 2020-05-19 DIAGNOSIS — Z98.890 OTHER SPECIFIED POSTPROCEDURAL STATES: Chronic | ICD-10-CM

## 2020-05-19 DIAGNOSIS — Z93.59 OTHER CYSTOSTOMY STATUS: Chronic | ICD-10-CM

## 2020-05-19 LAB
ALBUMIN SERPL ELPH-MCNC: 3.8 G/DL — SIGNIFICANT CHANGE UP (ref 3.5–5.2)
ALP SERPL-CCNC: 102 U/L — SIGNIFICANT CHANGE UP (ref 30–115)
ALT FLD-CCNC: 50 U/L — HIGH (ref 0–41)
ANION GAP SERPL CALC-SCNC: 14 MMOL/L — SIGNIFICANT CHANGE UP (ref 7–14)
APPEARANCE UR: ABNORMAL
AST SERPL-CCNC: 46 U/L — HIGH (ref 0–41)
BACTERIA # UR AUTO: ABNORMAL
BASOPHILS # BLD AUTO: 0.02 K/UL — SIGNIFICANT CHANGE UP (ref 0–0.2)
BASOPHILS NFR BLD AUTO: 0.2 % — SIGNIFICANT CHANGE UP (ref 0–1)
BILIRUB DIRECT SERPL-MCNC: <0.2 MG/DL — SIGNIFICANT CHANGE UP (ref 0–0.2)
BILIRUB INDIRECT FLD-MCNC: >0 MG/DL — LOW (ref 0.2–1.2)
BILIRUB SERPL-MCNC: 0.2 MG/DL — SIGNIFICANT CHANGE UP (ref 0.2–1.2)
BILIRUB UR-MCNC: NEGATIVE — SIGNIFICANT CHANGE UP
BUN SERPL-MCNC: 18 MG/DL — SIGNIFICANT CHANGE UP (ref 10–20)
CALCIUM SERPL-MCNC: 9.7 MG/DL — SIGNIFICANT CHANGE UP (ref 8.5–10.1)
CHLORIDE SERPL-SCNC: 102 MMOL/L — SIGNIFICANT CHANGE UP (ref 98–110)
CO2 SERPL-SCNC: 23 MMOL/L — SIGNIFICANT CHANGE UP (ref 17–32)
COLOR SPEC: YELLOW — SIGNIFICANT CHANGE UP
CREAT SERPL-MCNC: 0.7 MG/DL — SIGNIFICANT CHANGE UP (ref 0.7–1.5)
DIFF PNL FLD: ABNORMAL
EOSINOPHIL # BLD AUTO: 0 K/UL — SIGNIFICANT CHANGE UP (ref 0–0.7)
EOSINOPHIL NFR BLD AUTO: 0 % — SIGNIFICANT CHANGE UP (ref 0–8)
EPI CELLS # UR: 1 /HPF — SIGNIFICANT CHANGE UP (ref 0–5)
GLUCOSE SERPL-MCNC: 144 MG/DL — HIGH (ref 70–99)
GLUCOSE UR QL: NEGATIVE — SIGNIFICANT CHANGE UP
HCT VFR BLD CALC: 40.2 % — LOW (ref 42–52)
HGB BLD-MCNC: 13.6 G/DL — LOW (ref 14–18)
HYALINE CASTS # UR AUTO: 5 /LPF — SIGNIFICANT CHANGE UP (ref 0–7)
IMM GRANULOCYTES NFR BLD AUTO: 0.7 % — HIGH (ref 0.1–0.3)
KETONES UR-MCNC: SIGNIFICANT CHANGE UP
LACTATE SERPL-SCNC: 1.1 MMOL/L — SIGNIFICANT CHANGE UP (ref 0.7–2)
LEUKOCYTE ESTERASE UR-ACNC: ABNORMAL
LIDOCAIN IGE QN: 24 U/L — SIGNIFICANT CHANGE UP (ref 7–60)
LYMPHOCYTES # BLD AUTO: 0.62 K/UL — LOW (ref 1.2–3.4)
LYMPHOCYTES # BLD AUTO: 7.3 % — LOW (ref 20.5–51.1)
MCHC RBC-ENTMCNC: 31.6 PG — HIGH (ref 27–31)
MCHC RBC-ENTMCNC: 33.8 G/DL — SIGNIFICANT CHANGE UP (ref 32–37)
MCV RBC AUTO: 93.5 FL — SIGNIFICANT CHANGE UP (ref 80–94)
MONOCYTES # BLD AUTO: 0.57 K/UL — SIGNIFICANT CHANGE UP (ref 0.1–0.6)
MONOCYTES NFR BLD AUTO: 6.7 % — SIGNIFICANT CHANGE UP (ref 1.7–9.3)
NEUTROPHILS # BLD AUTO: 7.19 K/UL — HIGH (ref 1.4–6.5)
NEUTROPHILS NFR BLD AUTO: 85.1 % — HIGH (ref 42.2–75.2)
NITRITE UR-MCNC: NEGATIVE — SIGNIFICANT CHANGE UP
NRBC # BLD: 0 /100 WBCS — SIGNIFICANT CHANGE UP (ref 0–0)
PH UR: 7 — SIGNIFICANT CHANGE UP (ref 5–8)
PLATELET # BLD AUTO: 305 K/UL — SIGNIFICANT CHANGE UP (ref 130–400)
POTASSIUM SERPL-MCNC: 4.5 MMOL/L — SIGNIFICANT CHANGE UP (ref 3.5–5)
POTASSIUM SERPL-SCNC: 4.5 MMOL/L — SIGNIFICANT CHANGE UP (ref 3.5–5)
PROT SERPL-MCNC: 7.6 G/DL — SIGNIFICANT CHANGE UP (ref 6–8)
PROT UR-MCNC: ABNORMAL
RBC # BLD: 4.3 M/UL — LOW (ref 4.7–6.1)
RBC # FLD: 14 % — SIGNIFICANT CHANGE UP (ref 11.5–14.5)
RBC CASTS # UR COMP ASSIST: 25 /HPF — HIGH (ref 0–4)
SARS-COV-2 RNA SPEC QL NAA+PROBE: SIGNIFICANT CHANGE UP
SODIUM SERPL-SCNC: 139 MMOL/L — SIGNIFICANT CHANGE UP (ref 135–146)
SP GR SPEC: 1.02 — SIGNIFICANT CHANGE UP (ref 1.01–1.02)
UROBILINOGEN FLD QL: SIGNIFICANT CHANGE UP
WBC # BLD: 8.46 K/UL — SIGNIFICANT CHANGE UP (ref 4.8–10.8)
WBC # FLD AUTO: 8.46 K/UL — SIGNIFICANT CHANGE UP (ref 4.8–10.8)
WBC UR QL: 326 /HPF — HIGH (ref 0–5)

## 2020-05-19 PROCEDURE — 99223 1ST HOSP IP/OBS HIGH 75: CPT

## 2020-05-19 PROCEDURE — 74177 CT ABD & PELVIS W/CONTRAST: CPT | Mod: 26

## 2020-05-19 PROCEDURE — 99285 EMERGENCY DEPT VISIT HI MDM: CPT | Mod: GC

## 2020-05-19 PROCEDURE — 71045 X-RAY EXAM CHEST 1 VIEW: CPT | Mod: 26

## 2020-05-19 RX ORDER — SODIUM CHLORIDE 9 MG/ML
1000 INJECTION, SOLUTION INTRAVENOUS
Refills: 0 | Status: DISCONTINUED | OUTPATIENT
Start: 2020-05-19 | End: 2020-05-20

## 2020-05-19 RX ORDER — BACLOFEN 100 %
10 POWDER (GRAM) MISCELLANEOUS THREE TIMES A DAY
Refills: 0 | Status: DISCONTINUED | OUTPATIENT
Start: 2020-05-19 | End: 2020-05-26

## 2020-05-19 RX ORDER — GUAIFENESIN/DEXTROMETHORPHAN 600MG-30MG
5 TABLET, EXTENDED RELEASE 12 HR ORAL EVERY 6 HOURS
Refills: 0 | Status: DISCONTINUED | OUTPATIENT
Start: 2020-05-19 | End: 2020-05-19

## 2020-05-19 RX ORDER — SODIUM CHLORIDE 9 MG/ML
1000 INJECTION, SOLUTION INTRAVENOUS ONCE
Refills: 0 | Status: COMPLETED | OUTPATIENT
Start: 2020-05-19 | End: 2020-05-19

## 2020-05-19 RX ORDER — MEROPENEM 1 G/30ML
1000 INJECTION INTRAVENOUS EVERY 8 HOURS
Refills: 0 | Status: DISCONTINUED | OUTPATIENT
Start: 2020-05-19 | End: 2020-05-26

## 2020-05-19 RX ORDER — MORPHINE SULFATE 50 MG/1
4 CAPSULE, EXTENDED RELEASE ORAL ONCE
Refills: 0 | Status: DISCONTINUED | OUTPATIENT
Start: 2020-05-19 | End: 2020-05-19

## 2020-05-19 RX ORDER — ENOXAPARIN SODIUM 100 MG/ML
40 INJECTION SUBCUTANEOUS DAILY
Refills: 0 | Status: DISCONTINUED | OUTPATIENT
Start: 2020-05-19 | End: 2020-05-20

## 2020-05-19 RX ORDER — ONDANSETRON 8 MG/1
4 TABLET, FILM COATED ORAL EVERY 6 HOURS
Refills: 0 | Status: DISCONTINUED | OUTPATIENT
Start: 2020-05-19 | End: 2020-05-26

## 2020-05-19 RX ORDER — ALBUTEROL 90 UG/1
2 AEROSOL, METERED ORAL EVERY 6 HOURS
Refills: 0 | Status: DISCONTINUED | OUTPATIENT
Start: 2020-05-19 | End: 2020-05-26

## 2020-05-19 RX ORDER — SODIUM CHLORIDE 9 MG/ML
1000 INJECTION INTRAMUSCULAR; INTRAVENOUS; SUBCUTANEOUS ONCE
Refills: 0 | Status: COMPLETED | OUTPATIENT
Start: 2020-05-19 | End: 2020-05-19

## 2020-05-19 RX ORDER — PHENOBARBITAL 60 MG
64.8 TABLET ORAL DAILY
Refills: 0 | Status: DISCONTINUED | OUTPATIENT
Start: 2020-05-19 | End: 2020-05-26

## 2020-05-19 RX ORDER — TAMSULOSIN HYDROCHLORIDE 0.4 MG/1
0.4 CAPSULE ORAL AT BEDTIME
Refills: 0 | Status: DISCONTINUED | OUTPATIENT
Start: 2020-05-19 | End: 2020-05-26

## 2020-05-19 RX ORDER — CALCIUM CARBONATE 500(1250)
1 TABLET ORAL
Refills: 0 | Status: DISCONTINUED | OUTPATIENT
Start: 2020-05-19 | End: 2020-05-26

## 2020-05-19 RX ORDER — POLYETHYLENE GLYCOL 3350 17 G/17G
17 POWDER, FOR SOLUTION ORAL EVERY 12 HOURS
Refills: 0 | Status: DISCONTINUED | OUTPATIENT
Start: 2020-05-19 | End: 2020-05-26

## 2020-05-19 RX ORDER — SENNA PLUS 8.6 MG/1
2 TABLET ORAL AT BEDTIME
Refills: 0 | Status: DISCONTINUED | OUTPATIENT
Start: 2020-05-19 | End: 2020-05-26

## 2020-05-19 RX ORDER — MEROPENEM 1 G/30ML
1000 INJECTION INTRAVENOUS ONCE
Refills: 0 | Status: COMPLETED | OUTPATIENT
Start: 2020-05-19 | End: 2020-05-19

## 2020-05-19 RX ORDER — POLYETHYLENE GLYCOL 3350 17 G/17G
17 POWDER, FOR SOLUTION ORAL DAILY
Refills: 0 | Status: DISCONTINUED | OUTPATIENT
Start: 2020-05-19 | End: 2020-05-19

## 2020-05-19 RX ORDER — KETOROLAC TROMETHAMINE 30 MG/ML
30 SYRINGE (ML) INJECTION EVERY 6 HOURS
Refills: 0 | Status: DISCONTINUED | OUTPATIENT
Start: 2020-05-19 | End: 2020-05-19

## 2020-05-19 RX ADMIN — MEROPENEM 100 MILLIGRAM(S): 1 INJECTION INTRAVENOUS at 12:48

## 2020-05-19 RX ADMIN — POLYETHYLENE GLYCOL 3350 17 GRAM(S): 17 POWDER, FOR SOLUTION ORAL at 11:54

## 2020-05-19 RX ADMIN — Medication 1 TABLET(S): at 18:30

## 2020-05-19 RX ADMIN — Medication 10 MILLIGRAM(S): at 11:52

## 2020-05-19 RX ADMIN — MEROPENEM 100 MILLIGRAM(S): 1 INJECTION INTRAVENOUS at 21:48

## 2020-05-19 RX ADMIN — SENNA PLUS 2 TABLET(S): 8.6 TABLET ORAL at 21:48

## 2020-05-19 RX ADMIN — MEROPENEM 100 MILLIGRAM(S): 1 INJECTION INTRAVENOUS at 05:54

## 2020-05-19 RX ADMIN — SODIUM CHLORIDE 1000 MILLILITER(S): 9 INJECTION, SOLUTION INTRAVENOUS at 07:02

## 2020-05-19 RX ADMIN — Medication 30 MILLIGRAM(S): at 14:27

## 2020-05-19 RX ADMIN — Medication 1 DROP(S): at 18:30

## 2020-05-19 RX ADMIN — SODIUM CHLORIDE 100 MILLILITER(S): 9 INJECTION, SOLUTION INTRAVENOUS at 10:50

## 2020-05-19 RX ADMIN — SODIUM CHLORIDE 1000 MILLILITER(S): 9 INJECTION, SOLUTION INTRAVENOUS at 03:23

## 2020-05-19 RX ADMIN — MORPHINE SULFATE 4 MILLIGRAM(S): 50 CAPSULE, EXTENDED RELEASE ORAL at 03:22

## 2020-05-19 RX ADMIN — ALBUTEROL 2 PUFF(S): 90 AEROSOL, METERED ORAL at 19:03

## 2020-05-19 RX ADMIN — Medication 10 MILLIGRAM(S): at 21:48

## 2020-05-19 RX ADMIN — MEROPENEM 1000 MILLIGRAM(S): 1 INJECTION INTRAVENOUS at 07:00

## 2020-05-19 RX ADMIN — Medication 1 DROP(S): at 14:19

## 2020-05-19 RX ADMIN — ENOXAPARIN SODIUM 40 MILLIGRAM(S): 100 INJECTION SUBCUTANEOUS at 11:51

## 2020-05-19 RX ADMIN — SODIUM CHLORIDE 1000 MILLILITER(S): 9 INJECTION, SOLUTION INTRAVENOUS at 07:00

## 2020-05-19 RX ADMIN — SODIUM CHLORIDE 1000 MILLILITER(S): 9 INJECTION INTRAMUSCULAR; INTRAVENOUS; SUBCUTANEOUS at 16:22

## 2020-05-19 RX ADMIN — Medication 64.8 MILLIGRAM(S): at 12:01

## 2020-05-19 RX ADMIN — MORPHINE SULFATE 4 MILLIGRAM(S): 50 CAPSULE, EXTENDED RELEASE ORAL at 05:55

## 2020-05-19 RX ADMIN — Medication 1 DROP(S): at 23:13

## 2020-05-19 NOTE — H&P ADULT - ASSESSMENT
Patient is a 65 y/o male with PMH of cerebral palsy, seizure disorder, spastic paraplegia, s/p PEG tube, Asthma, H/O nephrolithiasis, BPH with bladder neck stricture s/p suprapubic catheter, and H/O Pseudomonas and ESBL Proteus mirabilis bacteruria who presented from a group with abdominal pain x1 days found to have R sided nephrolithiasis with hydronephrosis. Pt also has recent URI symptoms r/o COVID.     #Nephrolithiasis with Hydronephrosis   #r/o Postobstructive Infection   - CT A/P - right hydronephrosis with a 9 x 7 x 10 mm proximal right ureteral calculus and ureteral enhancement concerning for infection   - Pain control with Toradol, monitor renal function   - IVF, Tamsulosin and Bentyl to facilitate passage   - f/u Urology, possible lithoscopy due to 10mm size   - Suprapubic draining well, monitor urine output   - c/w Meropenem for concern of postobstructive infection in the setting for chronic colonization   - UA is positive, f/u Urine Cx, will likely be positive   - f/u blood culture    # URI symptoms w/ COVID contact in last 2 weeks - COVID + resident in group home, tested neg at group home, Cough is likely from NON_COVID bronchitis on last admission, new upper respiratory congestion, subjective fevers, f/u COVID PCR, New transaminitis on admission, Lymphopenia is chronic, on RA   # Seizure disorder - Continue with phenobarbital 64.8mg PO TID  # Cerebral palsy - Continue with baclofen 10mg PO three times a day, c/w PEG feeds   # Asthma - No wheezing on exam, on RA, Albuterol PRN   # BPH with bladder neck stricture s/p suprapubic catheter - Tube replaced day before admission, Examined by Urology on admission, UA is grossly positive, Chronic colonization w/ ESBL Proteus Mirabilis, grew Pseudomonas on last admission, cleared by ID to be off Abx last admission    DVT Prophylaxis: Lovenox 40mg SQ once daily  GI Prophylaxis: Not indicated   Diet: NPO with tube feedings  Activity: IAT  Dispo: Group home  Code Status: Full Code

## 2020-05-19 NOTE — ED PROVIDER NOTE - PROGRESS NOTE DETAILS
AA: (+) urine in collection bag AA: [id number] stone seen on imaging. D/w urology PA 4163. AA: [id number] stone seen on imaging. D/w urology PA 3486. Septic stone. IV abx given. hemodynamically stable. Per urology, can admit the patient and will need OR later today.

## 2020-05-19 NOTE — H&P ADULT - NSHPLABSRESULTS_GEN_ALL_CORE
13.6   8.46  )-----------( 305      ( 19 May 2020 03:15 )             40.2         139  |  102  |  18  ----------------------------<  144<H>  4.5   |  23  |  0.7    Ca    9.7      19 May 2020 03:15    TPro  7.6  /  Alb  3.8  /  TBili  0.2  /  DBili  <0.2  /  AST  46<H>  /  ALT  50<H>  /  AlkPhos  102          LIVER FUNCTIONS - ( 19 May 2020 03:15 )  Alb: 3.8 g/dL / Pro: 7.6 g/dL / ALK PHOS: 102 U/L / ALT: 50 U/L / AST: 46 U/L / GGT: x             Urinalysis Basic - ( 19 May 2020 03:25 )    Color: Yellow / Appearance: Slightly Turbid / S.016 / pH: x  Gluc: x / Ketone: Trace  / Bili: Negative / Urobili: <2 mg/dL   Blood: x / Protein: 30 mg/dL / Nitrite: Negative   Leuk Esterase: Large / RBC: 25 /HPF /  /HPF   Sq Epi: x / Non Sq Epi: 1 /HPF / Bacteria: Few        Lactate, Blood: 1.1 mmol/L ( @ 03:15)    Blood, Urine: Moderate ( @ 03:25)      EKG: Sinus Tach     RADIOLOGY STUDIES:    < from: CT Abdomen and Pelvis w/ IV Cont (20 @ 04:42) >    IMPRESSION:    Moderate right hydronephrosis with delayed right renal nephrogram secondary to a 9 x 7 x 10 mm proximal right ureteral calculus. Right ureteral enhancement may be reactive or reflect superimposed infection.    < end of copied text >

## 2020-05-19 NOTE — ED ADULT NURSE NOTE - INTERVENTIONS DEFINITIONS
Provide visual cue, wrist band, yellow gown, etc./Monitor gait and stability/Review medications for side effects contributing to fall risk/Monitor for mental status changes and reorient to person, place, and time/Physically safe environment: no spills, clutter or unnecessary equipment/Provide visual clues: red socks

## 2020-05-19 NOTE — ED PROVIDER NOTE - CARE PLAN
Principal Discharge DX:	Kidney stone  Secondary Diagnosis:	Hydronephrosis  Secondary Diagnosis:	Urinary tract infection

## 2020-05-19 NOTE — H&P ADULT - HISTORY OF PRESENT ILLNESS
Patient is a 65 y/o male with PMH of cerebral palsy, seizure disorder, spastic paraplegia, s/p PEG tube, Asthma, H/O nephrolithiasis, BPH with bladder neck stricture s/p suprapubic catheter, and H/O Pseudomonas and ESBL Proteus mirabilis bacteruria who presented from a group with abdominal pain x1 days. Pt states that he started getting a R sided flank pain with radiation to his pelvis after getting his suprapubic cathter tube changes. Pt states that the pain is sharp and constant. Pt also endorses some nausea and vomited x1 in the AM. He also has some pain at the catheter insertion site however catheter has been draining well. Off note pt has a progressive worsening congestion for the last few days. As per aid, pts group home had a resident test postive for COVID. Pt was likely in close contact with resident. Pt also endorses recent subjective fevers. He has had a cough for the last month, pt was recently admitted to the ICU for non-COVID coronavirus bronchitis, never intubated. Denies any other complaints. Denies any chills, changes in weight, chest pain, SOB, diarrhea, myalgias, arthralgias syncope, fatigue.   In the ED, /67  RR 18 SpO2 94 on RA afebrile. CT A/P showed a [id number] proximal right ureteral stone with mod hydronephrosis. Urolgoy was consulted. Pt was given IVF, pain control and Merox1. Swabbed for COVID.

## 2020-05-19 NOTE — CHART NOTE - NSCHARTNOTEFT_GEN_A_CORE
Called by RN that patient's BP was low 79/51 so gave 1L NS bolus and pt's BP improved.   RN also noted Called by RN that patient's BP was low 79/51 so gave 1L NS bolus and pt's BP improved. Pt not tachy or febrile.  RN also note that is he is barely making 10-15cc/hr urine despite bolus and maintence fluids - pt has baseline suprapubic and is on flomax.  Also hasn't had BM w/ stool burden on CT and mildly distended abd.       PLAN:   - to give dulcolax  - inc miralax to BID  - enema if dulcolax fails  - flush suprapubic alatorre, try to find bladder scan if possible  - monitor UOP, but last Cr 0.6

## 2020-05-19 NOTE — H&P ADULT - NSHPPHYSICALEXAM_GEN_ALL_CORE
CONSTITUTIONAL: Contracted; in mild distress, speaking in full sentences  SKIN: warm, dry  HEAD: Normocephalic; atraumatic  EYES: PERRL, EOMI, no conjunctival erythema  ENT: No nasal discharge; mucous membranes moist  NECK: Supple; non tender, FROM  CARD: +S1, S2 no murmurs, gallops, or rubs. Regular rate and rhythm. radial 2+  RESP: No wheezes, rales or rhonchi. CTABL, Upper airway congestion   ABD: soft, R flank and CVA tenderness, nondistended, no rebound, no guarding, no rigidity, neg murphys, PEG+, Suprapubic +   EXT:  UE contracted, No clubbing, cyanosis or edema.   NEURO: Alert, oriented, spastic limbs unable to assess   PSYCH: Cooperative, appropriate

## 2020-05-19 NOTE — H&P ADULT - NSHPPOAPULMEMBOLUS_GEN_A_CORE
[facility]    Hospitalist Progress Note    Date of Service (when I saw the patient): 03/21/2018    Assessment & Plan   Joseluis Falcon is a 85 year-old male with history of bilateral inguinal herniorrhaphy and psoriasis who presented to [facility] ED on 02/27/2018 with a 2-3 day history of nausea, vomiting and abdominal pain.  Workup revealed SBO, likely due to adhesions.  Failed nonoperative management for 6 days after which he was taken to the OR on 03/04/2018 and underwent exp lap, TISHA and partial small-bowel resection.  He has had a complicated postop course including extended intubation, pneumonia and prolonged ICU delirium. Hospitalist team resumed primary medical cares 3/20/18.      Small bowel obstruction secondary to adhesions, s/p exploratory laparoscopy with lysis of adhesions and small bowel resection, repair of small umbilical hernia 3/4/18  Presented with symptoms of nausea, vomiting and abdominal pain, found secondary to SBO which was in turn due to adhesions.  He failed nonoperative management and underwent above surgery. Post-op complicated by an ileus which has now resolved.    - General surgery following   - Post-operative cares per general surgery service     Severe malnutrition  The patient did not receive nutrition for greater than 5 days, thus was initiated on TPN, which has since weaned off.  He is now getting tube feed via NJ tube.  - Continue tube feeds     Acute hypercapnic respiratory failure  Required intubation for airway protection for exploratory laparotomy, TISHA and partial small-bowel obstruction.  Able to extubate on 03/11 but reintubated on 03/12, and was ultimately able to extubate on 03/18.  CT chest with IV contrast on 03/06 was negative for PE, but revealed mild emphysema and fibrosis, (probably age-related). VBG 3/20/18 7.42/48/-  - Currently on room air     Postoperative atrial fibrillation with RVR  Required amiodarone drip and rate controlling agent.   - Currently on  oral amiodarone only. Continue with load as ordered.   - On enoxaparin 60 mg subcutaneous b.i.d. for stroke prophylaxis.       Metabolic encephalopathy  Prolonged delirium secondary to pneumonia and ICU delirium.  - Remains confused  - Continue scheduled quetiapine   - Re-orient as needed  - Maintain normal sleep/wake and day/night cycle  - Avoid sedating/altering medications as able  - Mitts in place and sitter at bedside as pulling at lines    Aspiration pneumonia, resolved  Completed 7 day course of piperacillin-tazobactam IV   - Stable respiratory status.      Acute post-operative blood loss anemia, stable  Thrombocytopenia, resolved  - Hemoglobin stable  - Platelet count within normal limits  - Monitor periodically    Acute kidney injury  Peak creatinine 1.37. Likely prerenal, responded well to IV fluid hydration.   - Creatinine stable  - Monitor BMP    Pain Assessment  Current Pain Score 3/20/2018 3/20/2018 3/20/2018   Patient currently in pain? denies other (see comments) sleeping: patient not able to self report   Pain score (0-10) - - -   Pain location - - -   Pain descriptors - - -   CPOT pain score - - -   - Joseluis is unable to participate in a collaborative plan for pain management due to encephalopathy.   - Please see the plan for pain management as documented above    DVT Prophylaxis: Enoxaparin (Lovenox) SQ  Code Status: DNR/DNI    Disposition: Expected discharge unclear. Pending improvement in confusion, plan for tube feeds.     Raad Aguilar    Interval History   No acute events overnight. Remains confused and periodically agitated. Unable to obtain reliable history from patient due to his encephalopathy.     -Data reviewed today: I reviewed all new labs and imaging results over the last 24 hours. I personally reviewed no images or EKG's today.    Physical Exam   Temp: 98  F (36.7  C) Temp src: Axillary BP: 116/60 Pulse: 95 Heart Rate: 78 Resp: 20 SpO2: 98 % O2 Device: None (Room air)    Vitals:     03/18/18 0400 03/19/18 0400 03/20/18 0400   Weight: 63.8 kg (140 lb 10.5 oz) 63.4 kg (139 lb 12.4 oz) 63.7 kg (140 lb 6.9 oz)     Vital Signs with Ranges  Temp:  [97.9  F (36.6  C)-98  F (36.7  C)] 98  F (36.7  C)  Pulse:  [95] 95  Heart Rate:  [] 78  Resp:  [15-32] 20  BP: ()/(58-89) 116/60  SpO2:  [69 %-99 %] 98 %  I/O last 3 completed shifts:  In: 2024 [I.V.:20; NG/GT:1324]  Out: 1585 [Urine:1585]    Constitutional: Elderly male, chronically ill appearing  Respiratory: Clear to auscultation bilaterally, good air movement bilaterally  Cardiovascular: Irregularly irregular, no m/r/g. No peripheral edema.  GI: Soft, non-tender, non-distended. BS normoactive.   Skin/Integumen: Warm, dry  Neuro: Alert. [place]. Oriented to self only. Following some commands.      Medications       QUEtiapine  25 mg Oral BID     bacitracin   Topical TID     amiodarone  200 mg Oral or FT or NG tube BID    Followed by     [START ON 3/23/2018] amiodarone  200 mg Oral or FT or NG tube Daily     rantidine  150 mg Oral BID     enoxaparin  1 mg/kg Subcutaneous Q12H     ipratropium - albuterol 0.5 mg/2.5 mg/3 mL  3 mL Nebulization 4x daily     sodium chloride (PF)  10 mL Intracatheter Q8H       Data     Recent Labs  Lab 03/21/18  0600 03/20/18  0310 03/19/18  0500  03/17/18  0445   WBC 7.3 8.4  --   --  7.2   HGB 9.8* 10.0*  --   --  9.1*   MCV 97 98  --   --  101*    245  --   --  295   INR  --   --  1.14  --   --     140 139  < > Canceled, Test credited   POTASSIUM 4.1 4.3 4.5  < > Canceled, Test credited   CHLORIDE 105 104 107  < > Canceled, Test credited   CO2 29 29 29  < > Canceled, Test credited   BUN 29 31* 35*  < > Canceled, Test credited   CR 0.67 0.68 0.72  < > Canceled, Test credited   ANIONGAP 4 7 3  < > Canceled, Test credited   TOBI 7.6* 7.6* 7.9*  < > Canceled, Test credited   * 110* 112*  < > Canceled, Test credited   ALBUMIN  --   --  1.9*  --   --    PROTTOTAL  --   --  5.8*  --   --    BILITOTAL   --   --  0.4  --   --    ALKPHOS  --   --  176*  --   --    ALT  --   --  44  --   --    AST  --   --  35  --   --    < > = values in this interval not displayed.    No results found for this or any previous visit (from the past 24 hour(s)).     no

## 2020-05-19 NOTE — CONSULT NOTE ADULT - SUBJECTIVE AND OBJECTIVE BOX
CONSULT NOTE    HPI: Patient is a 63 yo Male with past hx of cerebral palsy, seizure disorder, spastic paraplegia, BPH, bladder neck stricture s/p SPT presents with abdominal pain.  was called for R proximal ureteral stone. Pt is well known to Urology and follows Dr. Joyner as OP. Pt's aide reports after getting suprapubic tube changed yesterday he has been complaining of pain to his abdomen. Pt admits to R flank pain that started yesterday afternoon. Pt's aide admits SPT has been draining well with clearr yellow urine. Aide reports that pt has been coughing the past couple of days and is worried about COVID status. Denies fever, chills, N/V, or hematuria.       PAST MEDICAL & SURGICAL HISTORY:  Asthma  Urinary retention  Urinary calculi  Spastic quadriplegia  Osteoporosis  Seizure: last seizure &gt;10 years ago  Cerebral palsy  BPH (benign prostatic hyperplasia)  Suprapubic catheter  H/O cystoscopy  S/P percutaneous endoscopic gastrostomy (PEG) tube placement      REVIEW OF SYSTEMS:    [ x ] a 10 point review of systems was negative except where noted    [  ]  Due to altered mental status/intubation, subjective information was not able t be obtained from the patient.  History was obtained, to the extent possible, from review of the chart and collateral sources of information.            MEDICATIONS  (STANDING):  lactated ringers Bolus 1000 milliLiter(s) IV Bolus once    MEDICATIONS  (PRN):      Allergies    No Known Allergies    Intolerances        SOCIAL HISTORY: No illicit drug use    FAMILY HISTORY:  Family history unknown      Vital Signs Last 24 Hrs  T(C): 37.3 (19 May 2020 03:16), Max: 37.3 (19 May 2020 03:16)  T(F): 99.1 (19 May 2020 03:16), Max: 99.1 (19 May 2020 03:16)  HR: 108 (19 May 2020 06:00) (108 - 119)  BP: 136/82 (19 May 2020 06:00) (132/82 - 136/82)  BP(mean): 103 (19 May 2020 06:00) (102 - 103)  RR: 19 (19 May 2020 06:00) (18 - 19)  SpO2: 95% (19 May 2020 06:00) (94% - 97%)    Daily     Daily     PHYSICAL EXAM:    Constitutional: NAD, lying in bed  HEENT: EOMI  Back: + Right CVA tenderness   Respiratory: No accessory respiratory muscle use  Abd: Soft, non-tender, abdomen distended, no organomegally    : suprapubic tube in place draining clear yellow urine, + skin tag on L side of stoma site    Extremities: no edema  Skin: No rashes  MSK: paraplegic    I&O's Summary      LABS:                        13.6   8.46  )-----------( 305      ( 19 May 2020 03:15 )             40.2         139  |  102  |  18  ----------------------------<  144<H>  4.5   |  23  |  0.7    Ca    9.7      19 May 2020 03:15    TPro  7.6  /  Alb  3.8  /  TBili  0.2  /  DBili  <0.2  /  AST  46<H>  /  ALT  50<H>  /  AlkPhos  102        Urinalysis Basic - ( 19 May 2020 03:25 )    Color: Yellow / Appearance: Slightly Turbid / S.016 / pH: x  Gluc: x / Ketone: Trace  / Bili: Negative / Urobili: <2 mg/dL   Blood: x / Protein: 30 mg/dL / Nitrite: Negative   Leuk Esterase: Large / RBC: 25 /HPF /  /HPF   Sq Epi: x / Non Sq Epi: 1 /HPF / Bacteria: Few      Urine Culture: ..pending         RADIOLOGY & ADDITIONAL STUDIES:  < from: CT Abdomen and Pelvis w/ IV Cont (20 @ 04:42) >  EXAM:  CT ABDOMEN AND PELVIS IC            PROCEDURE DATE:  2020            INTERPRETATION:  CLINICAL STATEMENT: Abdominal pain. Suprapubic catheter change today      TECHNIQUE: Contiguous CT images were obtained of the abdomen and pelvis.  Intravenous Contrast:  Intravenous contrast administered.    Oral contrast: was not administered.        COMPARISON:  CT abdomen pelvis dated 11/10/2019    FINDINGS:    LOWER CHEST: Left hemidiaphragm elevation and left greater than right bibasilar atelectasis    LIVER: Unremarkable aside from a subcentimeter left hepatic lobe hypodensity, unchanged     SPLEEN: Unremarkable.    PANCREAS: Unremarkable.    GALLBLADDER AND BILIARY TREE: Motion limited evaluation of the gallbladder.    ADRENALS: Unremarkable.    KIDNEYS: There is new moderate right hydronephrosis with delayed right renal nephrogram secondary to a 9 x 7 x 10 mm proximal right ureteral calculus (750 Hounsfield units). There are multiple additional nonobstructing right renal calculi including one in the renal pelvis which are difficult to accurately measure given motion artifact. No left-sided hydronephrosis.    LYMPH NODES: There are no enlarged abdominal or pelvic lymph nodes.    VASCULATURE: No aortic aneurysm    BOWEL: PEG balloon within the stomach. No evidence for bowel obstruction. Moderate to large stool in the rectum as well as additional copious stool throughout the colon. Surgical clip at the cecum. Unremarkable appendix.     PERITONEUM/RETROPERITONEUM/MESENTERY: There is no ascites or pneumoperitoneum.    PELVIC VISCERA: Suprapubic Edouard catheter balloon within the urinary bladder. Underdistention limits evaluation of the urinary bladder. Enlarged prostate gland.    BONES AND SOFT TISSUES: Diffuse osteopenia, bilateral hip degenerative change/dysplasia, exaggerated lumbar lordosis and atrophy of the paraspinal musculature.       IMPRESSION:    Moderate right hydronephrosis with delayed right renal nephrogram secondary to a 9 x 7 x 10 mm proximal right ureteral calculus. Right ureteral enhancement may be reactive or reflect superimposed infection.            BRI MCLEAN M.D., ATTENDING RADIOLOGIST  This document has been electronically signed. May 19 2020  5:03AM        < end of copied text >  CONSULT NOTE    HPI: Patient is a 63 yo Male with past hx of cerebral palsy, seizure disorder, spastic paraplegia, BPH, bladder neck stricture s/p SPT presents with abdominal pain.  was called for R proximal ureteral stone. Pt is well known to Urology and follows Dr. Joyner as OP. Pt's aide reports after getting suprapubic tube changed yesterday he has been complaining of pain to his abdomen. Pt admits to R flank pain that started yesterday afternoon. Pt's aide admits SPT has been draining well with clearr yellow urine. Aide reports that pt has been coughing the past couple of days and is worried about COVID status. Denies fever, chills, N/V, or hematuria.       CT images visualized by attending -- obstructing right ureteral stone with multiple renal stones     PAST MEDICAL & SURGICAL HISTORY:  Asthma  Urinary retention  Urinary calculi  Spastic quadriplegia  Osteoporosis  Seizure: last seizure &gt;10 years ago  Cerebral palsy  BPH (benign prostatic hyperplasia)  Suprapubic catheter  H/O cystoscopy  S/P percutaneous endoscopic gastrostomy (PEG) tube placement      REVIEW OF SYSTEMS:    [ x ] a 10 point review of systems was negative except where noted    [  ]  Due to altered mental status/intubation, subjective information was not able t be obtained from the patient.  History was obtained, to the extent possible, from review of the chart and collateral sources of information.            MEDICATIONS  (STANDING):  lactated ringers Bolus 1000 milliLiter(s) IV Bolus once    MEDICATIONS  (PRN):      Allergies    No Known Allergies    Intolerances        SOCIAL HISTORY: No illicit drug use    FAMILY HISTORY:  Family history unknown      Vital Signs Last 24 Hrs  T(C): 37.3 (19 May 2020 03:16), Max: 37.3 (19 May 2020 03:16)  T(F): 99.1 (19 May 2020 03:16), Max: 99.1 (19 May 2020 03:16)  HR: 108 (19 May 2020 06:00) (108 - 119)  BP: 136/82 (19 May 2020 06:00) (132/82 - 136/82)  BP(mean): 103 (19 May 2020 06:00) (102 - 103)  RR: 19 (19 May 2020 06:00) (18 - 19)  SpO2: 95% (19 May 2020 06:00) (94% - 97%)    Daily     Daily     PHYSICAL EXAM:    Constitutional: NAD, lying in bed  HEENT: EOMI  Back: + Right CVA tenderness   Respiratory: No accessory respiratory muscle use  Abd: Soft, non-tender, abdomen distended, no organomegally    : suprapubic tube in place draining clear yellow urine, + skin tag on L side of stoma site    Extremities: no edema  Skin: No rashes  MSK: paraplegic    I&O's Summary      LABS:                        13.6   8.46  )-----------( 305      ( 19 May 2020 03:15 )             40.2         139  |  102  |  18  ----------------------------<  144<H>  4.5   |  23  |  0.7    Ca    9.7      19 May 2020 03:15    TPro  7.6  /  Alb  3.8  /  TBili  0.2  /  DBili  <0.2  /  AST  46<H>  /  ALT  50<H>  /  AlkPhos  102        Urinalysis Basic - ( 19 May 2020 03:25 )    Color: Yellow / Appearance: Slightly Turbid / S.016 / pH: x  Gluc: x / Ketone: Trace  / Bili: Negative / Urobili: <2 mg/dL   Blood: x / Protein: 30 mg/dL / Nitrite: Negative   Leuk Esterase: Large / RBC: 25 /HPF /  /HPF   Sq Epi: x / Non Sq Epi: 1 /HPF / Bacteria: Few      Urine Culture: ..pending         RADIOLOGY & ADDITIONAL STUDIES:  < from: CT Abdomen and Pelvis w/ IV Cont (20 @ 04:42) >  EXAM:  CT ABDOMEN AND PELVIS IC            PROCEDURE DATE:  2020            INTERPRETATION:  CLINICAL STATEMENT: Abdominal pain. Suprapubic catheter change today      TECHNIQUE: Contiguous CT images were obtained of the abdomen and pelvis.  Intravenous Contrast:  Intravenous contrast administered.    Oral contrast: was not administered.        COMPARISON:  CT abdomen pelvis dated 11/10/2019    FINDINGS:    LOWER CHEST: Left hemidiaphragm elevation and left greater than right bibasilar atelectasis    LIVER: Unremarkable aside from a subcentimeter left hepatic lobe hypodensity, unchanged     SPLEEN: Unremarkable.    PANCREAS: Unremarkable.    GALLBLADDER AND BILIARY TREE: Motion limited evaluation of the gallbladder.    ADRENALS: Unremarkable.    KIDNEYS: There is new moderate right hydronephrosis with delayed right renal nephrogram secondary to a 9 x 7 x 10 mm proximal right ureteral calculus (750 Hounsfield units). There are multiple additional nonobstructing right renal calculi including one in the renal pelvis which are difficult to accurately measure given motion artifact. No left-sided hydronephrosis.    LYMPH NODES: There are no enlarged abdominal or pelvic lymph nodes.    VASCULATURE: No aortic aneurysm    BOWEL: PEG balloon within the stomach. No evidence for bowel obstruction. Moderate to large stool in the rectum as well as additional copious stool throughout the colon. Surgical clip at the cecum. Unremarkable appendix.     PERITONEUM/RETROPERITONEUM/MESENTERY: There is no ascites or pneumoperitoneum.    PELVIC VISCERA: Suprapubic Edouard catheter balloon within the urinary bladder. Underdistention limits evaluation of the urinary bladder. Enlarged prostate gland.    BONES AND SOFT TISSUES: Diffuse osteopenia, bilateral hip degenerative change/dysplasia, exaggerated lumbar lordosis and atrophy of the paraspinal musculature.       IMPRESSION:    Moderate right hydronephrosis with delayed right renal nephrogram secondary to a 9 x 7 x 10 mm proximal right ureteral calculus. Right ureteral enhancement may be reactive or reflect superimposed infection.            BRI MCLEAN M.D., ATTENDING RADIOLOGIST  This document has been electronically signed. May 19 2020  5:03AM        < end of copied text >

## 2020-05-19 NOTE — ED PROVIDER NOTE - OBJECTIVE STATEMENT
64M h/o CP, seizure disorder, spastic paraplegia, s/p PEG tube, BPH, bladder neck stricture s/p suprapubic cath, history of ESBL, asthma p/w abdominal pain. pt had suprapubic catheter exchanged today, immediately felt pain after. pt has had pain all day. Pt has had cough for the past couple of days. Pt was admitted to ICU near the end of March for a non-COVID coronavirus respiratory illness, he was never intubated.

## 2020-05-19 NOTE — CONSULT NOTE ADULT - ASSESSMENT
65 yo Male with past hx of cerebral palsy, seizure disorder, spastic paraplegia, BPH, bladder neck stricture s/p SPT presents with R flank pain. CT abdomen & pelvis shows moderate R hydro with delayed R renal nephrogram 2/2 to 9x7x10 mm proximal R ureteral calculus    Assessment- Right moderate hydronephrosis 2/2 to 9 x 7 x 10mm proximal ureteral stone     Plan:   - pt getting admitted to medicine, COVID swabbed in ED    - cont. IV fluids/hydration   - medically optimize  - analgesia for pain control  - rec Flomax    - will discuss with attending 65 yo Male with past hx of cerebral palsy, seizure disorder, spastic paraplegia, BPH, bladder neck stricture s/p SPT presents with R flank pain. CT abdomen & pelvis shows moderate R hydro with delayed R renal nephrogram 2/2 to 9x7x10 mm proximal R ureteral calculus    Assessment- Right moderate hydronephrosis 2/2 to 9 x 7 x 10mm proximal ureteral stone     Plan:   - pt getting admitted to medicine, COVID swabbed in ED    - cont. IV fluids/hydration   - medically optimize for potential OR procedure   - analgesia for pain control  - monitor urine output   - cont. SPT care, rec applying stat lock to secure catheter onto pt and prevent tension   - will discuss with attending

## 2020-05-19 NOTE — ED ADULT NURSE REASSESSMENT NOTE - NS ED NURSE REASSESS COMMENT FT1
Pt received laying in bed. IV dressing dry and intact. IV patent. Pt vitals entered into chart. Pt aid at bedside. Pt admitted to , swabbed for COVID. Awaiting COVID results for admission.  Pt denies pain.

## 2020-05-19 NOTE — ED PROVIDER NOTE - PHYSICAL EXAMINATION
Constitutional: Spastic paralysis. In discomfort.   Head: Atraumatic.  Eyes: PERRLA. EOMI without discomfort.   ENT: No nasal discharge. Dry MM.  Neck: Supple. Painless ROM.  Cardiovascular: Sinus tachycardia. Normal S1 and S2. No murmurs. 2+ pulses in all extremities.   Pulmonary: Normal respiratory rate and effort. B/l rhonchi.   Abdominal: Soft. Diffuse abdominal tenderness.   Extremities: Thin, contracted.   Skin: No rashes.   Neuro: AAOX3. Unable to properly assess due to spastic limbs.

## 2020-05-19 NOTE — ED PROVIDER NOTE - CLINICAL SUMMARY MEDICAL DECISION MAKING FREE TEXT BOX
Pt admitted in stable condition. Urology consulted. Pt admitted in stable condition. Urology consulted. Infected kidney stone

## 2020-05-19 NOTE — ED ADULT NURSE NOTE - OBJECTIVE STATEMENT
Pt restless and uncomfortable, hx of MR. Pt presents with lower abdominal pain that has been occurring since a new suprapubic catheter was placed today. Pt from group home accompanied by caregiver. Pt present with cough and congestion for 1 month, as per caregiver pt just came off quarantine bc members of the home had covid. Breathing regular and unlabored. Sp02 is 94% on RA. Will continue to monitor. Pt restless and uncomfortable, hx of MR. Pt presents with lower abdominal pain that has been occurring since a new suprapubic catheter was placed today. Abdomen round and rigid, last BM was yesterday. Pt from group home accompanied by caregiver. Pt present with cough and congestion for 1 month, as per caregiver pt just came off quarantine bc members of the home had covid. Breathing regular and unlabored. Sp02 is 94% on RA. Will continue to monitor.

## 2020-05-19 NOTE — ED ADULT NURSE NOTE - CHIEF COMPLAINT QUOTE
Abdominal pain of the lower quadrant, pt has a suprapubic catheter that was changed today and has been having pain ever since.

## 2020-05-20 LAB
-  COAGULASE NEGATIVE STAPHYLOCOCCUS: SIGNIFICANT CHANGE UP
ALBUMIN SERPL ELPH-MCNC: 2.8 G/DL — LOW (ref 3.5–5.2)
ALBUMIN SERPL ELPH-MCNC: 3.5 G/DL — SIGNIFICANT CHANGE UP (ref 3.5–5.2)
ALP SERPL-CCNC: 74 U/L — SIGNIFICANT CHANGE UP (ref 30–115)
ALP SERPL-CCNC: 89 U/L — SIGNIFICANT CHANGE UP (ref 30–115)
ALT FLD-CCNC: 30 U/L — SIGNIFICANT CHANGE UP (ref 0–41)
ALT FLD-CCNC: 33 U/L — SIGNIFICANT CHANGE UP (ref 0–41)
ANION GAP SERPL CALC-SCNC: 13 MMOL/L — SIGNIFICANT CHANGE UP (ref 7–14)
ANION GAP SERPL CALC-SCNC: 22 MMOL/L — HIGH (ref 7–14)
AST SERPL-CCNC: 25 U/L — SIGNIFICANT CHANGE UP (ref 0–41)
AST SERPL-CCNC: 36 U/L — SIGNIFICANT CHANGE UP (ref 0–41)
BASE EXCESS BLDA CALC-SCNC: SIGNIFICANT CHANGE UP MMOL/L (ref -2–2)
BASOPHILS # BLD AUTO: 0.01 K/UL — SIGNIFICANT CHANGE UP (ref 0–0.2)
BASOPHILS # BLD AUTO: 0.01 K/UL — SIGNIFICANT CHANGE UP (ref 0–0.2)
BASOPHILS NFR BLD AUTO: 0.1 % — SIGNIFICANT CHANGE UP (ref 0–1)
BASOPHILS NFR BLD AUTO: 0.1 % — SIGNIFICANT CHANGE UP (ref 0–1)
BILIRUB SERPL-MCNC: 0.3 MG/DL — SIGNIFICANT CHANGE UP (ref 0.2–1.2)
BILIRUB SERPL-MCNC: 0.4 MG/DL — SIGNIFICANT CHANGE UP (ref 0.2–1.2)
BLD GP AB SCN SERPL QL: SIGNIFICANT CHANGE UP
BUN SERPL-MCNC: 17 MG/DL — SIGNIFICANT CHANGE UP (ref 10–20)
BUN SERPL-MCNC: 20 MG/DL — SIGNIFICANT CHANGE UP (ref 10–20)
CALCIUM SERPL-MCNC: 8.1 MG/DL — LOW (ref 8.5–10.1)
CALCIUM SERPL-MCNC: 8.8 MG/DL — SIGNIFICANT CHANGE UP (ref 8.5–10.1)
CHLORIDE SERPL-SCNC: 104 MMOL/L — SIGNIFICANT CHANGE UP (ref 98–110)
CHLORIDE SERPL-SCNC: 97 MMOL/L — LOW (ref 98–110)
CO2 SERPL-SCNC: 19 MMOL/L — SIGNIFICANT CHANGE UP (ref 17–32)
CO2 SERPL-SCNC: 22 MMOL/L — SIGNIFICANT CHANGE UP (ref 17–32)
CREAT SERPL-MCNC: 0.7 MG/DL — SIGNIFICANT CHANGE UP (ref 0.7–1.5)
CREAT SERPL-MCNC: 1 MG/DL — SIGNIFICANT CHANGE UP (ref 0.7–1.5)
EOSINOPHIL # BLD AUTO: 0.01 K/UL — SIGNIFICANT CHANGE UP (ref 0–0.7)
EOSINOPHIL # BLD AUTO: 0.01 K/UL — SIGNIFICANT CHANGE UP (ref 0–0.7)
EOSINOPHIL NFR BLD AUTO: 0.1 % — SIGNIFICANT CHANGE UP (ref 0–8)
EOSINOPHIL NFR BLD AUTO: 0.1 % — SIGNIFICANT CHANGE UP (ref 0–8)
GLUCOSE SERPL-MCNC: 106 MG/DL — HIGH (ref 70–99)
GLUCOSE SERPL-MCNC: 111 MG/DL — HIGH (ref 70–99)
GRAM STN FLD: SIGNIFICANT CHANGE UP
HCO3 BLDA-SCNC: 27 MMOL/L — SIGNIFICANT CHANGE UP (ref 23–27)
HCT VFR BLD CALC: 32.1 % — LOW (ref 42–52)
HCT VFR BLD CALC: 37.8 % — LOW (ref 42–52)
HGB BLD-MCNC: 10.6 G/DL — LOW (ref 14–18)
HGB BLD-MCNC: 12.5 G/DL — LOW (ref 14–18)
IMM GRANULOCYTES NFR BLD AUTO: 0.2 % — SIGNIFICANT CHANGE UP (ref 0.1–0.3)
IMM GRANULOCYTES NFR BLD AUTO: 0.4 % — HIGH (ref 0.1–0.3)
LACTATE SERPL-SCNC: 1.4 MMOL/L — SIGNIFICANT CHANGE UP (ref 0.7–2)
LACTATE SERPL-SCNC: 10.6 MMOL/L — CRITICAL HIGH (ref 0.7–2)
LYMPHOCYTES # BLD AUTO: 0.42 K/UL — LOW (ref 1.2–3.4)
LYMPHOCYTES # BLD AUTO: 0.68 K/UL — LOW (ref 1.2–3.4)
LYMPHOCYTES # BLD AUTO: 5.1 % — LOW (ref 20.5–51.1)
LYMPHOCYTES # BLD AUTO: 8 % — LOW (ref 20.5–51.1)
MAGNESIUM SERPL-MCNC: 1.7 MG/DL — LOW (ref 1.8–2.4)
MCHC RBC-ENTMCNC: 31.6 PG — HIGH (ref 27–31)
MCHC RBC-ENTMCNC: 32.7 PG — HIGH (ref 27–31)
MCHC RBC-ENTMCNC: 33 G/DL — SIGNIFICANT CHANGE UP (ref 32–37)
MCHC RBC-ENTMCNC: 33.1 G/DL — SIGNIFICANT CHANGE UP (ref 32–37)
MCV RBC AUTO: 95.8 FL — HIGH (ref 80–94)
MCV RBC AUTO: 99 FL — HIGH (ref 80–94)
METHOD TYPE: SIGNIFICANT CHANGE UP
MONOCYTES # BLD AUTO: 0.17 K/UL — SIGNIFICANT CHANGE UP (ref 0.1–0.6)
MONOCYTES # BLD AUTO: 0.8 K/UL — HIGH (ref 0.1–0.6)
MONOCYTES NFR BLD AUTO: 2 % — SIGNIFICANT CHANGE UP (ref 1.7–9.3)
MONOCYTES NFR BLD AUTO: 9.7 % — HIGH (ref 1.7–9.3)
NEUTROPHILS # BLD AUTO: 6.94 K/UL — HIGH (ref 1.4–6.5)
NEUTROPHILS # BLD AUTO: 7.61 K/UL — HIGH (ref 1.4–6.5)
NEUTROPHILS NFR BLD AUTO: 84.6 % — HIGH (ref 42.2–75.2)
NEUTROPHILS NFR BLD AUTO: 89.6 % — HIGH (ref 42.2–75.2)
NRBC # BLD: 0 /100 WBCS — SIGNIFICANT CHANGE UP (ref 0–0)
NRBC # BLD: 0 /100 WBCS — SIGNIFICANT CHANGE UP (ref 0–0)
PCO2 BLDA: 39 MMHG — SIGNIFICANT CHANGE UP (ref 38–42)
PH BLDA: 7.45 — HIGH (ref 7.38–7.42)
PHENOBARB SERPL-MCNC: 8.4 UG/ML — LOW (ref 15–40)
PHOSPHATE SERPL-MCNC: 2 MG/DL — LOW (ref 2.1–4.9)
PLATELET # BLD AUTO: 187 K/UL — SIGNIFICANT CHANGE UP (ref 130–400)
PLATELET # BLD AUTO: 202 K/UL — SIGNIFICANT CHANGE UP (ref 130–400)
PO2 BLDA: 104 MMHG — HIGH (ref 78–95)
POTASSIUM SERPL-MCNC: 4 MMOL/L — SIGNIFICANT CHANGE UP (ref 3.5–5)
POTASSIUM SERPL-MCNC: 4.6 MMOL/L — SIGNIFICANT CHANGE UP (ref 3.5–5)
POTASSIUM SERPL-SCNC: 4 MMOL/L — SIGNIFICANT CHANGE UP (ref 3.5–5)
POTASSIUM SERPL-SCNC: 4.6 MMOL/L — SIGNIFICANT CHANGE UP (ref 3.5–5)
PROT SERPL-MCNC: 5.8 G/DL — LOW (ref 6–8)
PROT SERPL-MCNC: 7.1 G/DL — SIGNIFICANT CHANGE UP (ref 6–8)
RBC # BLD: 3.35 M/UL — LOW (ref 4.7–6.1)
RBC # BLD: 3.82 M/UL — LOW (ref 4.7–6.1)
RBC # FLD: 14.6 % — HIGH (ref 11.5–14.5)
RBC # FLD: 14.6 % — HIGH (ref 11.5–14.5)
SAO2 % BLDA: SIGNIFICANT CHANGE UP % (ref 94–98)
SODIUM SERPL-SCNC: 138 MMOL/L — SIGNIFICANT CHANGE UP (ref 135–146)
SODIUM SERPL-SCNC: 139 MMOL/L — SIGNIFICANT CHANGE UP (ref 135–146)
SPECIMEN SOURCE: SIGNIFICANT CHANGE UP
WBC # BLD: 8.21 K/UL — SIGNIFICANT CHANGE UP (ref 4.8–10.8)
WBC # BLD: 8.5 K/UL — SIGNIFICANT CHANGE UP (ref 4.8–10.8)
WBC # FLD AUTO: 8.21 K/UL — SIGNIFICANT CHANGE UP (ref 4.8–10.8)
WBC # FLD AUTO: 8.5 K/UL — SIGNIFICANT CHANGE UP (ref 4.8–10.8)

## 2020-05-20 PROCEDURE — 71045 X-RAY EXAM CHEST 1 VIEW: CPT | Mod: 26

## 2020-05-20 PROCEDURE — 50433 PLMT NEPHROURETERAL CATHETER: CPT | Mod: RT

## 2020-05-20 PROCEDURE — 99223 1ST HOSP IP/OBS HIGH 75: CPT

## 2020-05-20 PROCEDURE — 99232 SBSQ HOSP IP/OBS MODERATE 35: CPT

## 2020-05-20 PROCEDURE — 93010 ELECTROCARDIOGRAM REPORT: CPT

## 2020-05-20 RX ORDER — MAGNESIUM SULFATE 500 MG/ML
2 VIAL (ML) INJECTION ONCE
Refills: 0 | Status: COMPLETED | OUTPATIENT
Start: 2020-05-20 | End: 2020-05-20

## 2020-05-20 RX ORDER — SODIUM CHLORIDE 9 MG/ML
1000 INJECTION, SOLUTION INTRAVENOUS
Refills: 0 | Status: DISCONTINUED | OUTPATIENT
Start: 2020-05-20 | End: 2020-05-21

## 2020-05-20 RX ORDER — SODIUM CHLORIDE 9 MG/ML
1000 INJECTION INTRAMUSCULAR; INTRAVENOUS; SUBCUTANEOUS ONCE
Refills: 0 | Status: COMPLETED | OUTPATIENT
Start: 2020-05-20 | End: 2020-05-20

## 2020-05-20 RX ORDER — ACETAMINOPHEN 500 MG
650 TABLET ORAL ONCE
Refills: 0 | Status: COMPLETED | OUTPATIENT
Start: 2020-05-20 | End: 2020-05-20

## 2020-05-20 RX ORDER — LACTULOSE 10 G/15ML
30 SOLUTION ORAL ONCE
Refills: 0 | Status: COMPLETED | OUTPATIENT
Start: 2020-05-20 | End: 2020-05-20

## 2020-05-20 RX ORDER — SODIUM CHLORIDE 9 MG/ML
1000 INJECTION, SOLUTION INTRAVENOUS ONCE
Refills: 0 | Status: COMPLETED | OUTPATIENT
Start: 2020-05-20 | End: 2020-05-20

## 2020-05-20 RX ORDER — ACETAMINOPHEN 500 MG
650 TABLET ORAL EVERY 6 HOURS
Refills: 0 | Status: DISCONTINUED | OUTPATIENT
Start: 2020-05-20 | End: 2020-05-26

## 2020-05-20 RX ORDER — KETOROLAC TROMETHAMINE 30 MG/ML
30 SYRINGE (ML) INJECTION ONCE
Refills: 0 | Status: DISCONTINUED | OUTPATIENT
Start: 2020-05-20 | End: 2020-05-20

## 2020-05-20 RX ADMIN — SODIUM CHLORIDE 1000 MILLILITER(S): 9 INJECTION, SOLUTION INTRAVENOUS at 18:18

## 2020-05-20 RX ADMIN — Medication 10 MILLIGRAM(S): at 12:46

## 2020-05-20 RX ADMIN — Medication 50 GRAM(S): at 10:27

## 2020-05-20 RX ADMIN — Medication 1 DROP(S): at 18:26

## 2020-05-20 RX ADMIN — Medication 650 MILLIGRAM(S): at 05:39

## 2020-05-20 RX ADMIN — SODIUM CHLORIDE 100 MILLILITER(S): 9 INJECTION, SOLUTION INTRAVENOUS at 05:16

## 2020-05-20 RX ADMIN — Medication 30 MILLIGRAM(S): at 16:18

## 2020-05-20 RX ADMIN — MEROPENEM 100 MILLIGRAM(S): 1 INJECTION INTRAVENOUS at 21:43

## 2020-05-20 RX ADMIN — Medication 1 DROP(S): at 12:47

## 2020-05-20 RX ADMIN — MEROPENEM 100 MILLIGRAM(S): 1 INJECTION INTRAVENOUS at 16:47

## 2020-05-20 RX ADMIN — Medication 650 MILLIGRAM(S): at 16:18

## 2020-05-20 RX ADMIN — MEROPENEM 100 MILLIGRAM(S): 1 INJECTION INTRAVENOUS at 05:15

## 2020-05-20 RX ADMIN — POLYETHYLENE GLYCOL 3350 17 GRAM(S): 17 POWDER, FOR SOLUTION ORAL at 17:03

## 2020-05-20 RX ADMIN — Medication 650 MILLIGRAM(S): at 12:54

## 2020-05-20 RX ADMIN — Medication 1 TABLET(S): at 17:03

## 2020-05-20 RX ADMIN — Medication 1 TABLET(S): at 05:15

## 2020-05-20 RX ADMIN — Medication 10 MILLIGRAM(S): at 05:15

## 2020-05-20 RX ADMIN — POLYETHYLENE GLYCOL 3350 17 GRAM(S): 17 POWDER, FOR SOLUTION ORAL at 05:15

## 2020-05-20 RX ADMIN — Medication 10 MILLIGRAM(S): at 21:44

## 2020-05-20 RX ADMIN — SODIUM CHLORIDE 2000 MILLILITER(S): 9 INJECTION INTRAMUSCULAR; INTRAVENOUS; SUBCUTANEOUS at 16:19

## 2020-05-20 RX ADMIN — Medication 64.8 MILLIGRAM(S): at 12:46

## 2020-05-20 RX ADMIN — SODIUM CHLORIDE 125 MILLILITER(S): 9 INJECTION, SOLUTION INTRAVENOUS at 21:45

## 2020-05-20 RX ADMIN — Medication 1 DROP(S): at 05:16

## 2020-05-20 NOTE — PROGRESS NOTE ADULT - ASSESSMENT
63 y/o  Male known to urology. Pt with chronic SP tube for non complaint bladder.  Pt found with obstructing right ureteral stone. Pt spiked a fever last PM to 102.    A) Sepsis, right hydronephrosis 2/2 obstructing right ureteral stone  Neurogenic bladder  Hx of urethral stricture  Nephrolithiasis    P) Stat consult for IR placement of Nephroureteral stent  Continue IV abx  medical clearance for general anesthesia  once medically optimal will schedule for PCNL procedure  check cultures  will d/w attending

## 2020-05-20 NOTE — PROGRESS NOTE ADULT - SUBJECTIVE AND OBJECTIVE BOX
INTERVENTIONAL RADIOLOGY BRIEF-OPERATIVE NOTE    Procedure:   Percutaneous right nephrostomy with placement of 8-Tuvaluan, 24cm right nephroureteral catheter    Pre-Op Diagnosis:  Obstructive right uropathy; right nephrolithiasis    Post-Op Diagnosis:  Partially obstructing right UPJ calculus; right renal calculi    Attending:   Dr. Plascencia  Resident:   None    Anesthesia (type):  [ ] General Anesthesia  [X] Sedation-- Versed, 3 mg and Fentanyl, 75 mcg, iv  [ ] Spinal Anesthesia  [X] Local/Regional-- 1% Lidocaine, SQ, 8cc, right flank    Total Face-to-Face Sedation Time:  49 minutes    Contrast:   Optiray-320, 20 cc to right renal collecting system/urinary bladder, drained    Estimated Blood Loss:  5 cc    Condition:   [ ] Critical  [ ] Serious  [ ] Fair   [X] Good    Findings/Follow up Plan of Care:  Multiple right renal calculi; completely obstructing right UPJ calculus causing hydronephrosis.  Posterior, upper pole right renal access undertaken with real-time U/S and fluoro guidance.  Guidewire traversal of right UPJ calculus easily achieved, followed by placement of 8-Tuvaluan, 24 cm right nephroureteral stent.  Catheter draining serosanguinous urine, externally to gravity.  Patient tolerated procedure well otherwise.    Specimens Removed:  6cc serosanguinous right kidney urine for UA, Gram Stain, culture and sensitivities    Implants:  None    Complications:  None immediate.    Disposition:  Back to floor.  Please monitor output q shift and f/u UA and cultures.    Case d/w Dr. Cooper via telephone immediately post-procedure, with read-back.      Please call Interventional Radiology [phone] with any questions, concerns, or issues.

## 2020-05-20 NOTE — PROGRESS NOTE ADULT - SUBJECTIVE AND OBJECTIVE BOX
Progress Note    Subjective  65 y/o  Male known to urology. Pt with chronic SP tube for non complaint bladder.  Pt found with obstructing right ureteral stone. Pt spiked a fever last PM to 102.  In NAD    [  ] a 10 point review of systems was negative except where noted    [x]  Due to altered mental status/intubation, subjective information was not able t be obtained from the patient.  History was obtained, to the extent possible, from review of the chart and collateral sources of information.      Vital signs  T(C): , Max: 39 (05-20-20 @ 05:01)  HR: 105 (05-20-20 @ 17:27)  BP: 93/55 (05-20-20 @ 17:27)  SpO2: 97% (05-20-20 @ 06:44)    Gen NC/AT in NAD  Neck supple, FROM  Abd   + right CVAT, soft non tender, bladder not palpable   SP tube draining clear urine    Labs                        12.5   8.50  )-----------( 202      ( 20 May 2020 16:20 )             37.8     20 May 2020 16:20    138    |  97     |  17     ----------------------------<  111    4.6     |  19     |  1.0      Ca    8.8        20 May 2020 16:20  Phos  2.0       20 May 2020 04:57  Mg     1.7       20 May 2020 04:57

## 2020-05-20 NOTE — CONSULT NOTE ADULT - ASSESSMENT
65 y/o male with PMH of cerebral palsy, seizure disorder, spastic paraplegia, s/p PEG tube, Asthma, H/O nephrolithiasis, BPH with bladder neck stricture s/p suprapubic catheter, and H/O Pseudomonas and ESBL Proteus mirabilis bacteruria who presented from a group with abdominal pain x1 days found to have R sided nephrolithiasis with hydronephrosis. Pt also has recent URI symptoms r/o COVID.     IMPRESSION;   Sepsis ( T 102, /m ) secondary to acute Right pyelonephritis with right hydronephrosis with right ureteral stone  CT A/P - right hydronephrosis with a 9 x 7 x 10 mm proximal right ureteral calculus and ureteral enhancement concerning for infection   COVID-19     RECOMMENDATIONS;  IVR : R PCN planned for possibly today  BCx  UCx  Meropenem 1 gm iv q8h ( 3/24 UCx ESBL P mirabils, P aeruginosa )

## 2020-05-20 NOTE — CONSULT NOTE ADULT - SUBJECTIVE AND OBJECTIVE BOX
CC: H/o Seizure has elevated lactate and low phenobarb levels        HPI:  65 y/o male with PMH of cerebral palsy, seizure disorder, spastic paraplegia, s/p PEG tube, Asthma, H/O nephrolithiasis, BPH with bladder neck stricture s/p suprapubic catheter, and H/O Pseudomonas and ESBL Proteus mirabilis bacteruria who presented from a group home with abdominal pain. CT A/P showed a [id number] proximal right ureteral stone with mod hydronephrosis is s/p nephrostomy this afternoon. Consult called because post procedurally patient was tachycardic with HR in the 160s, a lactate of 10.6 and a subtherapeutic phenobarbital level of 8.4ug/ml. No clinical seizures noted.          Neuro Exam:  Orientation: Patient is awake and alert, he is pleasant , speech is slurred which is baseline he was able to say his name and place ,  following commands  Cranial Nerves: Pupils 3mm reactive , eyes dysconjugate at primary position, has Slurred speech  Motor: Spastic tone of upper and lower extremities             Moves Upper extremities              No mvmt noted in b/l Lower extremities  Sensory exam: Intact UE>LE  Plantar responses mute      NIHSS:         Allergies    No Known Allergies    Intolerances      MEDICATIONS  (STANDING):  baclofen 10 milliGRAM(s) Oral three times a day  calcium carbonate   1250 mG (OsCal) 1 Tablet(s) Oral two times a day  dicyclomine 10 milliGRAM(s) Oral daily  lactated ringers. 1000 milliLiter(s) (125 mL/Hr) IV Continuous <Continuous>  meropenem  IVPB 1000 milliGRAM(s) IV Intermittent every 8 hours  PHENobarbital 64.8 milliGRAM(s) Oral daily  polyethylene glycol 3350 17 Gram(s) Oral every 12 hours  polyvinyl alcohol 1.4%/povidone 0.6% Ophthalmic Solution - Peds 1 Drop(s) Both EYES four times a day  senna 2 Tablet(s) Oral at bedtime  tamsulosin 0.4 milliGRAM(s) Oral at bedtime        MEDICATIONS  (PRN):  acetaminophen   Tablet .. 650 milliGRAM(s) Oral every 6 hours PRN Temp greater or equal to 38C (100.4F)  ALBUTerol    90 MICROgram(s) HFA Inhaler 2 Puff(s) Inhalation every 6 hours PRN Shortness of Breath and/or Wheezing  benzonatate 100 milliGRAM(s) Oral every 8 hours PRN Cough  ketorolac   Injectable 30 milliGRAM(s) IV Push every 6 hours PRN Severe Pain (7 - 10)  ondansetron Injectable 4 milliGRAM(s) IV Push every 6 hours PRN Nausea      LABS:                        12.5   8.50  )-----------( 202      ( 20 May 2020 16:20 )             37.8     05-20    138  |  97<L>  |  17  ----------------------------<  111<H>  4.6   |  19  |  1.0    Ca    8.8      20 May 2020 16:20  Phos  2.0     05-20  Mg     1.7     05-20    TPro  7.1  /  Alb  3.5  /  TBili  0.4  /  DBili  x   /  AST  36  /  ALT  33  /  AlkPhos  89  05-20            Neuro Imaging:    EEG:     Echo:   Carotid Doppler: N/A  Telemetry: CC: H/o Seizure has elevated lactate and low phenobarb levels    HPI:  65 y/o male with PMH of cerebral palsy, seizure disorder, spastic paraplegia, s/p PEG tube, Asthma, H/O nephrolithiasis, BPH with bladder neck stricture s/p suprapubic catheter, and H/O Pseudomonas and ESBL Proteus mirabilis bacteruria who presented from a group home with abdominal pain. CT A/P showed a [id number] proximal right ureteral stone with mod hydronephrosis is s/p nephrostomy this afternoon. Consult called because post procedurally patient was tachycardic with HR in the 160s, a lactate of 10.6 and a subtherapeutic phenobarbital level of 8.4ug/ml. No clinical seizures noted.    Neuro Exam:  Orientation: Patient is awake and alert, he is pleasant , speech is slurred which is baseline he was able to say his name and place ,  following commands  Cranial Nerves: Pupils 3mm reactive , eyes dysconjugate at primary position, has Slurred speech  Motor: Spastic tone of upper and lower extremities             Moves Upper extremities              No mvmt noted in b/l Lower extremities  Sensory exam: Intact UE>LE  Plantar responses mute    Allergies    No Known Allergies    Intolerances    MEDICATIONS  (STANDING):  baclofen 10 milliGRAM(s) Oral three times a day  calcium carbonate   1250 mG (OsCal) 1 Tablet(s) Oral two times a day  dicyclomine 10 milliGRAM(s) Oral daily  lactated ringers. 1000 milliLiter(s) (125 mL/Hr) IV Continuous <Continuous>  meropenem  IVPB 1000 milliGRAM(s) IV Intermittent every 8 hours  PHENobarbital 64.8 milliGRAM(s) Oral daily  polyethylene glycol 3350 17 Gram(s) Oral every 12 hours  polyvinyl alcohol 1.4%/povidone 0.6% Ophthalmic Solution - Peds 1 Drop(s) Both EYES four times a day  senna 2 Tablet(s) Oral at bedtime  tamsulosin 0.4 milliGRAM(s) Oral at bedtime    MEDICATIONS  (PRN):  acetaminophen   Tablet .. 650 milliGRAM(s) Oral every 6 hours PRN Temp greater or equal to 38C (100.4F)  ALBUTerol    90 MICROgram(s) HFA Inhaler 2 Puff(s) Inhalation every 6 hours PRN Shortness of Breath and/or Wheezing  benzonatate 100 milliGRAM(s) Oral every 8 hours PRN Cough  ketorolac   Injectable 30 milliGRAM(s) IV Push every 6 hours PRN Severe Pain (7 - 10)  ondansetron Injectable 4 milliGRAM(s) IV Push every 6 hours PRN Nausea    LABS:                        12.5   8.50  )-----------( 202      ( 20 May 2020 16:20 )             37.8     05-20    138  |  97<L>  |  17  ----------------------------<  111<H>  4.6   |  19  |  1.0    Ca    8.8      20 May 2020 16:20  Phos  2.0     05-20  Mg     1.7     05-20    TPro  7.1  /  Alb  3.5  /  TBili  0.4  /  DBili  x   /  AST  36  /  ALT  33  /  AlkPhos  89  05-20

## 2020-05-20 NOTE — CHART NOTE - NSCHARTNOTEFT_GEN_A_CORE
**Please reference prior chart note**    Patient's stat lactate returned 10.6 with noted elevated AG to 22. Patient hemodynamically stable on assessment able to answer questions appropriately. Phenobarbital level sent and low at 8.4. Was given 1L bolus while in IR suite. Gave additional 1L LR bolus and will repeat ABG with lactate to assess for any improvement. Called Neurology to discuss need to adjust Phenobarbital regimen as concern for seizure-induced elevated lactate.    Case discussed with Pulmonary fellow on call. Will review lactate trend and if worsening or if hemodynamic compromise, will recall. Signed out to night intern on call.

## 2020-05-20 NOTE — CONSULT NOTE ADULT - ASSESSMENT
64-year-old male with h/o cerebral palsy presented from group home for  abdominal pain, found to have a large obstructing right ureteral stone and moderate hydronephrosis, urothelial enhancement and fever s/p nephrostomy this afternoon. Consult called because post procedurally patient was tachycardic with HR in the 160s, a lactate of 10.6 and a subtherapeutic phenobarbital level of 8.4ug/ml. No clinical seizures noted. Currently with baseline mental status. Discussed with Neuro attending on call Dr Villalobos       Plan  -Administer phenobarb 64.8mg x 1 now and continue the current maintenance dose  -Seizure precautions  -REEG  -Check phenobarb trough level in 48 hrs.  -Replete magnesium stat keep >2  <2.5  -Sepsis work up  -Communicated the plan with medical team (spectra [phone])

## 2020-05-20 NOTE — PROGRESS NOTE ADULT - SUBJECTIVE AND OBJECTIVE BOX
Patient is a 64y old  Male who presents with a chief complaint of Abdominal pain (20 May 2020 10:16)      OVERNIGHT EVENTS:    SUBJECTIVE / INTERVAL HPI: Patient seen and examined at bedside.     VITAL SIGNS:  Vital Signs Last 24 Hrs  T(C): 38.1 (20 May 2020 06:44), Max: 39 (20 May 2020 05:01)  T(F): 100.6 (20 May 2020 06:44), Max: 102.2 (20 May 2020 05:01)  HR: 111 (20 May 2020 05:01) (92 - 116)  BP: 126/76 (20 May 2020 05:01) (79/51 - 129/69)  BP(mean): --  RR: 18 (20 May 2020 05:01) (18 - 18)  SpO2: 97% (20 May 2020 06:44) (96% - 98%)    PHYSICAL EXAM:    HEENT: NC/AT; PERRL, clear conjunctiva  Neck: supple  Cardiovascular: +S1/S2; RRR  Respiratory: CTA b/l; no W/R/R  Gastrointestinal: soft, not rigid, distended abdomen; +BSx4, PEG tube  Extremities: WWP; 2+ peripheral pulses; no edema   Neurological: contracted extremities non verbal,     MEDICATIONS:  MEDICATIONS  (STANDING):  baclofen 10 milliGRAM(s) Oral three times a day  calcium carbonate   1250 mG (OsCal) 1 Tablet(s) Oral two times a day  dicyclomine 10 milliGRAM(s) Oral daily  lactated ringers. 1000 milliLiter(s) (100 mL/Hr) IV Continuous <Continuous>  lactulose Syrup 30 Gram(s) Oral once  meropenem  IVPB 1000 milliGRAM(s) IV Intermittent every 8 hours  PHENobarbital 64.8 milliGRAM(s) Oral daily  polyethylene glycol 3350 17 Gram(s) Oral every 12 hours  polyvinyl alcohol 1.4%/povidone 0.6% Ophthalmic Solution - Peds 1 Drop(s) Both EYES four times a day  senna 2 Tablet(s) Oral at bedtime  tamsulosin 0.4 milliGRAM(s) Oral at bedtime    MEDICATIONS  (PRN):  acetaminophen   Tablet .. 650 milliGRAM(s) Oral every 6 hours PRN Temp greater or equal to 38C (100.4F)  ALBUTerol    90 MICROgram(s) HFA Inhaler 2 Puff(s) Inhalation every 6 hours PRN Shortness of Breath and/or Wheezing  benzonatate 100 milliGRAM(s) Oral every 8 hours PRN Cough  ketorolac   Injectable 30 milliGRAM(s) IV Push every 6 hours PRN Severe Pain (7 - 10)  ondansetron Injectable 4 milliGRAM(s) IV Push every 6 hours PRN Nausea      ALLERGIES:  Allergies    No Known Allergies    Intolerances        LABS:                        10.6   8.21  )-----------( 187      ( 20 May 2020 04:57 )             32.1     05-20    139  |  104  |  20  ----------------------------<  106<H>  4.0   |  22  |  0.7    Ca    8.1<L>      20 May 2020 04:57  Phos  2.0       Mg     1.7         TPro  5.8<L>  /  Alb  2.8<L>  /  TBili  0.3  /  DBili  x   /  AST  25  /  ALT  30  /  AlkPhos  74  05-20      Urinalysis Basic - ( 19 May 2020 03:25 )    Color: Yellow / Appearance: Slightly Turbid / S.016 / pH: x  Gluc: x / Ketone: Trace  / Bili: Negative / Urobili: <2 mg/dL   Blood: x / Protein: 30 mg/dL / Nitrite: Negative   Leuk Esterase: Large / RBC: 25 /HPF /  /HPF   Sq Epi: x / Non Sq Epi: 1 /HPF / Bacteria: Few        < from: CT Abdomen and Pelvis w/ IV Cont (20 @ 04:42) >    IMPRESSION:    Moderate right hydronephrosis with delayed right renal nephrogram secondary to a 9 x 7 x 10 mm proximal right ureteral calculus. Right ureteral enhancement may be reactive or reflect superimposed infection.    < end of copied text > Patient is a 64y old  Male who presents with a chief complaint of Abdominal pain (20 May 2020 10:16)      OVERNIGHT EVENTS:    SUBJECTIVE / INTERVAL HPI: Patient seen and examined at bedside.     VITAL SIGNS:  Vital Signs Last 24 Hrs  T(C): 38.1 (20 May 2020 06:44), Max: 39 (20 May 2020 05:01)  T(F): 100.6 (20 May 2020 06:44), Max: 102.2 (20 May 2020 05:01)  HR: 111 (20 May 2020 05:01) (92 - 116)  BP: 126/76 (20 May 2020 05:01) (79/51 - 129/69)  BP(mean): --  RR: 18 (20 May 2020 05:01) (18 - 18)  SpO2: 97% (20 May 2020 06:44) (96% - 98%)    PHYSICAL EXAM:    HEENT: NC/AT; PERRL, clear conjunctiva  Neck: supple  Cardiovascular: +S1/S2; RRR  Respiratory: CTA b/l; no W/R/R  Gastrointestinal: soft, not rigid, distended abdomen; +BSx4, PEG tube  Extremities: WWP; 2+ peripheral pulses; no edema   Neurological: contracted extremities     MEDICATIONS:  MEDICATIONS  (STANDING):  baclofen 10 milliGRAM(s) Oral three times a day  calcium carbonate   1250 mG (OsCal) 1 Tablet(s) Oral two times a day  dicyclomine 10 milliGRAM(s) Oral daily  lactated ringers. 1000 milliLiter(s) (100 mL/Hr) IV Continuous <Continuous>  lactulose Syrup 30 Gram(s) Oral once  meropenem  IVPB 1000 milliGRAM(s) IV Intermittent every 8 hours  PHENobarbital 64.8 milliGRAM(s) Oral daily  polyethylene glycol 3350 17 Gram(s) Oral every 12 hours  polyvinyl alcohol 1.4%/povidone 0.6% Ophthalmic Solution - Peds 1 Drop(s) Both EYES four times a day  senna 2 Tablet(s) Oral at bedtime  tamsulosin 0.4 milliGRAM(s) Oral at bedtime    MEDICATIONS  (PRN):  acetaminophen   Tablet .. 650 milliGRAM(s) Oral every 6 hours PRN Temp greater or equal to 38C (100.4F)  ALBUTerol    90 MICROgram(s) HFA Inhaler 2 Puff(s) Inhalation every 6 hours PRN Shortness of Breath and/or Wheezing  benzonatate 100 milliGRAM(s) Oral every 8 hours PRN Cough  ketorolac   Injectable 30 milliGRAM(s) IV Push every 6 hours PRN Severe Pain (7 - 10)  ondansetron Injectable 4 milliGRAM(s) IV Push every 6 hours PRN Nausea      ALLERGIES:  Allergies    No Known Allergies    Intolerances        LABS:                        10.6   8.21  )-----------( 187      ( 20 May 2020 04:57 )             32.1     05-20    139  |  104  |  20  ----------------------------<  106<H>  4.0   |  22  |  0.7    Ca    8.1<L>      20 May 2020 04:57  Phos  2.0     05-  Mg     1.7         TPro  5.8<L>  /  Alb  2.8<L>  /  TBili  0.3  /  DBili  x   /  AST  25  /  ALT  30  /  AlkPhos  74  05-20      Urinalysis Basic - ( 19 May 2020 03:25 )    Color: Yellow / Appearance: Slightly Turbid / S.016 / pH: x  Gluc: x / Ketone: Trace  / Bili: Negative / Urobili: <2 mg/dL   Blood: x / Protein: 30 mg/dL / Nitrite: Negative   Leuk Esterase: Large / RBC: 25 /HPF /  /HPF   Sq Epi: x / Non Sq Epi: 1 /HPF / Bacteria: Few        < from: CT Abdomen and Pelvis w/ IV Cont (20 @ 04:42) >    IMPRESSION:    Moderate right hydronephrosis with delayed right renal nephrogram secondary to a 9 x 7 x 10 mm proximal right ureteral calculus. Right ureteral enhancement may be reactive or reflect superimposed infection.    < end of copied text > Patient is a 64y old  Male limited verbal communication. was sent in for evaluation of abd discomfort.     SUBJECTIVE / INTERVAL HPI: Patient seen and examined at bedside.     VITAL SIGNS:  Vital Signs Last 24 Hrs  T(C): 38.1 (20 May 2020 06:44), Max: 39 (20 May 2020 05:01)  T(F): 100.6 (20 May 2020 06:44), Max: 102.2 (20 May 2020 05:01)  HR: 111 (20 May 2020 05:01) (92 - 116)  BP: 126/76 (20 May 2020 05:01) (79/51 - 129/69)  BP(mean): --  RR: 18 (20 May 2020 05:01) (18 - 18)  SpO2: 97% (20 May 2020 06:44) (96% - 98%)    PHYSICAL EXAM:    HEENT: NC/AT; PERRL, clear conjunctiva  Neck: supple  Cardiovascular: +S1/S2; RRR  Respiratory: CTA b/l; no W/R/R  Gastrointestinal: soft, not rigid, distended abdomen; +BSx4, PEG tube  Extremities: WWP; 2+ peripheral pulses; no edema   Neurological: contracted extremities     MEDICATIONS:  MEDICATIONS  (STANDING):  baclofen 10 milliGRAM(s) Oral three times a day  calcium carbonate   1250 mG (OsCal) 1 Tablet(s) Oral two times a day  dicyclomine 10 milliGRAM(s) Oral daily  lactated ringers. 1000 milliLiter(s) (100 mL/Hr) IV Continuous <Continuous>  lactulose Syrup 30 Gram(s) Oral once  meropenem  IVPB 1000 milliGRAM(s) IV Intermittent every 8 hours  PHENobarbital 64.8 milliGRAM(s) Oral daily  polyethylene glycol 3350 17 Gram(s) Oral every 12 hours  polyvinyl alcohol 1.4%/povidone 0.6% Ophthalmic Solution - Peds 1 Drop(s) Both EYES four times a day  senna 2 Tablet(s) Oral at bedtime  tamsulosin 0.4 milliGRAM(s) Oral at bedtime    MEDICATIONS  (PRN):  acetaminophen   Tablet .. 650 milliGRAM(s) Oral every 6 hours PRN Temp greater or equal to 38C (100.4F)  ALBUTerol    90 MICROgram(s) HFA Inhaler 2 Puff(s) Inhalation every 6 hours PRN Shortness of Breath and/or Wheezing  benzonatate 100 milliGRAM(s) Oral every 8 hours PRN Cough  ketorolac   Injectable 30 milliGRAM(s) IV Push every 6 hours PRN Severe Pain (7 - 10)  ondansetron Injectable 4 milliGRAM(s) IV Push every 6 hours PRN Nausea      ALLERGIES:  Allergies    No Known Allergies    LABS:                        10.6   8.21  )-----------( 187      ( 20 May 2020 04:57 )             32.1     05-20    139  |  104  |  20  ----------------------------<  106<H>  4.0   |  22  |  0.7    Ca    8.1<L>      20 May 2020 04:57  Phos  2.0     05-  Mg     1.7         TPro  5.8<L>  /  Alb  2.8<L>  /  TBili  0.3  /  DBili  x   /  AST  25  /  ALT  30  /  AlkPhos  74  05-20      Urinalysis Basic - ( 19 May 2020 03:25 )    Color: Yellow / Appearance: Slightly Turbid / S.016 / pH: x  Gluc: x / Ketone: Trace  / Bili: Negative / Urobili: <2 mg/dL   Blood: x / Protein: 30 mg/dL / Nitrite: Negative   Leuk Esterase: Large / RBC: 25 /HPF /  /HPF   Sq Epi: x / Non Sq Epi: 1 /HPF / Bacteria: Few        < from: CT Abdomen and Pelvis w/ IV Cont (20 @ 04:42) >    IMPRESSION:    Moderate right hydronephrosis with delayed right renal nephrogram secondary to a 9 x 7 x 10 mm proximal right ureteral calculus. Right ureteral enhancement may be reactive or reflect superimposed infection.    < end of copied text >

## 2020-05-20 NOTE — PROGRESS NOTE ADULT - ASSESSMENT
Patient is a 63 y/o male with PMH of cerebral palsy, seizure disorder, spastic paraplegia, s/p PEG tube, Asthma, H/O nephrolithiasis, BPH with bladder neck stricture s/p suprapubic catheter, and H/O Pseudomonas and ESBL Proteus mirabilis bacteruria who presented from a group with abdominal pain x1 days.       #urosepsis 2/2 to Right pyelonephritis with hydronephrosis and ureter stone   - spiked fever while on abx, consulted IR for nephrostomy drainage   - Pain control with Toradol, monitor renal function   - IVF, Tamsulosin and Bentyl to facilitate passage   - c/w Meropenem  - f/u Urine and BCx,     # URI symptoms w/ COVID contact in last 2 weeks -COVID PCR negative on 5/19, has lymphopenia looks chronic   #chronic Cough is likely from [place] but non COVID bronchitis   # Seizure disorder - Continue with phenobarbital 64.8mg PO TID  # Cerebral palsy - Continue with baclofen 10mg PO three times a day, c/w PEG feeds   # Asthma - No wheezing on exam, on RA, Albuterol PRN   # BPH with bladder neck stricture s/p suprapubic catheter - Tube replaced day before admission, Chronic colonization w/ ESBL Proteus Mirabilis, grew Pseudomonas on last admission,  #DVT Prophylaxis: Lovenox 40mg SQ once daily, hold today for procedure   #GI Prophylaxis: Not indicated   #Diet: hold feeding for IR procedure  Activity: IAT  Dispo: Group home, [facility] [phone], called with no response   Code Status: Full Code  called [facility] from group [place] but didnt have response, [phone] Patient is a 65 y/o male with PMH of cerebral palsy, seizure disorder, spastic paraplegia, s/p PEG tube, Asthma, H/O nephrolithiasis, BPH with bladder neck stricture s/p suprapubic catheter, and H/O Pseudomonas and ESBL Proteus mirabilis bacteruria who presented from a group with abdominal pain x1 days.       #urosepsis 2/2 to Right pyelonephritis with hydronephrosis and ureter stone   - spiked fever while on abx, consulted IR for nephrostomy drainage   - Pain control with Toradol, monitor renal function   - IVF, Tamsulosin and Bentyl to facilitate passage   - c/w Meropenem  - f/u Urine and BCx,     #acute normocytic anemia: Hbg 10 <13, no signs of blood loss, monitor Hbg and keep active type and screening   # URI symptoms w/ COVID contact in last 2 weeks -COVID PCR negative on 5/19, has lymphopenia looks chronic   #chronic Cough is likely from [place] but non COVID bronchitis   # Seizure disorder - Continue with phenobarbital 64.8mg PO TID  # Cerebral palsy - Continue with baclofen 10mg PO three times a day, c/w PEG feeds   # Asthma - No wheezing on exam, on RA, Albuterol PRN   # BPH with bladder neck stricture s/p suprapubic catheter - Tube replaced day before admission, Chronic colonization w/ ESBL Proteus Mirabilis, grew Pseudomonas on last admission,  #DVT Prophylaxis: Lovenox 40mg SQ once daily, hold today for procedure   #GI Prophylaxis: Not indicated   #Diet: hold feeding for IR procedure  Activity: IAT  Dispo: Group home, [facility] [phone], called with no response   Code Status: Full Code  called Joaquín from group [place] but didnt have response, [phone] Patient is a 65 y/o male with PMH of cerebral palsy, seizure disorder, spastic paraplegia, s/p PEG tube, Asthma, H/O nephrolithiasis, BPH with bladder neck stricture s/p suprapubic catheter, and H/O Pseudomonas and ESBL Proteus mirabilis bacteruria who presented from a group with abdominal pain x1 days.       # abd pain , possible sepsis /pyelonephritis with hydronephrosis and ureter stone   - spiked fever while on abx, consulted IR for nephrostomy drainage   - Pain control with Toradol, monitor renal function   - IVF, Tamsulosin and Bentyl to facilitate passage   - c/w Meropenem  - f/u Urine and BCx,     #acute normocytic anemia: Hbg 10 <13, no signs of blood loss, monitor Hbg and keep active type and screening   # URI symptoms w/ COVID contact in last 2 weeks -COVID PCR negative on 5/19, has lymphopenia looks chronic   #chronic Cough is likely from [place] but non COVID bronchitis   # Seizure disorder - Continue with phenobarbital 64.8mg PO TID  # Cerebral palsy - Continue with baclofen 10mg PO three times a day, c/w PEG feeds   # Asthma - No wheezing on exam, on RA, Albuterol PRN   # BPH with bladder neck stricture s/p suprapubic catheter - Tube replaced day before admission, Chronic colonization w/ ESBL Proteus Mirabilis, grew Pseudomonas on last admission,  #DVT Prophylaxis: Lovenox 40mg SQ once daily, hold today for procedure   #GI Prophylaxis: Not indicated   #Diet: hold feeding for IR procedure  Activity: IAT  Dispo: Group home, [facility] [phone], called with no response   Code Status: Full Code  called [facility] from group [place] but didnt have response, [phone]

## 2020-05-20 NOTE — CONSULT NOTE ADULT - SUBJECTIVE AND OBJECTIVE BOX
HAWATISH SCHUSTER  64y, Male  Allergy: No Known Allergies      All historical available data reviewed.    HPI:  Patient is a 63 y/o male with PMH of cerebral palsy, seizure disorder, spastic paraplegia, s/p PEG tube, Asthma, H/O nephrolithiasis, BPH with bladder neck stricture s/p suprapubic catheter, and H/O Pseudomonas and ESBL Proteus mirabilis bacteruria who presented from a group with abdominal pain x1 days. Pt states that he started getting a R sided flank pain with radiation to his pelvis after getting his suprapubic cathter tube changes. Pt states that the pain is sharp and constant. Pt also endorses some nausea and vomited x1 in the AM. He also has some pain at the catheter insertion site however catheter has been draining well. Off note pt has a progressive worsening congestion for the last few days. As per aid, pts group home had a resident test postive for COVID. Pt was likely in close contact with resident. Pt also endorses recent subjective fevers. He has had a cough for the last month, pt was recently admitted to the ICU for non-COVID coronavirus bronchitis, never intubated. Denies any other complaints. Denies any chills, changes in weight, chest pain, SOB, diarrhea, myalgias, arthralgias syncope, fatigue.   In the ED, /67  RR 18 SpO2 94 on RA afebrile. CT A/P showed a [id number] proximal right ureteral stone with mod hydronephrosis. Urolgoy was consulted. Pt was given IVF, pain control and Merox1. Swabbed for COVID. (19 May 2020 08:44)    FAMILY HISTORY:  Family history unknown    PAST MEDICAL & SURGICAL HISTORY:  Asthma  Urinary retention  Urinary calculi  Spastic quadriplegia  Osteoporosis  Seizure: last seizure &gt;10 years ago  Cerebral palsy  BPH (benign prostatic hyperplasia)  Suprapubic catheter  H/O cystoscopy  S/P percutaneous endoscopic gastrostomy (PEG) tube placement        VITALS:  T(F): 100.6, Max: 102.2 (20 @ 05:01)  HR: 111  BP: 126/76  RR: 18Vital Signs Last 24 Hrs  T(C): 38.1 (20 May 2020 06:44), Max: 39 (20 May 2020 05:01)  T(F): 100.6 (20 May 2020 06:44), Max: 102.2 (20 May 2020 05:01)  HR: 111 (20 May 2020 05:01) (92 - 116)  BP: 126/76 (20 May 2020 05:01) (79/51 - 129/69)  BP(mean): --  RR: 18 (20 May 2020 05:01) (18 - 18)  SpO2: 97% (20 May 2020 06:44) (96% - 98%)    TESTS & MEASUREMENTS:                        10.6   8.21  )-----------( 187      ( 20 May 2020 04:57 )             32.1     05-20    139  |  104  |  20  ----------------------------<  106<H>  4.0   |  22  |  0.7    Ca    8.1<L>      20 May 2020 04:57  Phos  2.0     05-20  Mg     1.7     05-20    TPro  5.8<L>  /  Alb  2.8<L>  /  TBili  0.3  /  DBili  x   /  AST  25  /  ALT  30  /  AlkPhos  74  05-20    LIVER FUNCTIONS - ( 20 May 2020 04:57 )  Alb: 2.8 g/dL / Pro: 5.8 g/dL / ALK PHOS: 74 U/L / ALT: 30 U/L / AST: 25 U/L / GGT: x             Urinalysis Basic - ( 19 May 2020 03:25 )    Color: Yellow / Appearance: Slightly Turbid / S.016 / pH: x  Gluc: x / Ketone: Trace  / Bili: Negative / Urobili: <2 mg/dL   Blood: x / Protein: 30 mg/dL / Nitrite: Negative   Leuk Esterase: Large / RBC: 25 /HPF /  /HPF   Sq Epi: x / Non Sq Epi: 1 /HPF / Bacteria: Few          RADIOLOGY & ADDITIONAL TESTS:  Personal review of radiological diagnostics performed  Echo and EKG results noted when applicable.     MEDICATIONS:  acetaminophen   Tablet .. 650 milliGRAM(s) Oral every 6 hours PRN  ALBUTerol    90 MICROgram(s) HFA Inhaler 2 Puff(s) Inhalation every 6 hours PRN  baclofen 10 milliGRAM(s) Oral three times a day  benzonatate 100 milliGRAM(s) Oral every 8 hours PRN  calcium carbonate   1250 mG (OsCal) 1 Tablet(s) Oral two times a day  dicyclomine 10 milliGRAM(s) Oral daily  ketorolac   Injectable 30 milliGRAM(s) IV Push every 6 hours PRN  lactated ringers. 1000 milliLiter(s) IV Continuous <Continuous>  lactulose Syrup 30 Gram(s) Oral once  meropenem  IVPB 1000 milliGRAM(s) IV Intermittent every 8 hours  ondansetron Injectable 4 milliGRAM(s) IV Push every 6 hours PRN  PHENobarbital 64.8 milliGRAM(s) Oral daily  polyethylene glycol 3350 17 Gram(s) Oral every 12 hours  polyvinyl alcohol 1.4%/povidone 0.6% Ophthalmic Solution - Peds 1 Drop(s) Both EYES four times a day  senna 2 Tablet(s) Oral at bedtime  tamsulosin 0.4 milliGRAM(s) Oral at bedtime      ANTIBIOTICS:  meropenem  IVPB 1000 milliGRAM(s) IV Intermittent every 8 hours

## 2020-05-20 NOTE — CHART NOTE - NSCHARTNOTEFT_GEN_A_CORE
called by RN at IR as pt is tachycardia 160,  pt is s/p Percutaneous right nephrostomy with right ujreter catheter due to urosepsis on top of r URETOR STONE,  pt VS: , RR 24, sat 97% on 4 L NC, /68, Temp 101.6F  pt given 1 L of blous, tpradol 30mg, tachycardia but noramla sinus, no on RS distress,     can go back to 3 E, cont same management called by RN at IR as pt is tachycardia 160,  pt is s/p Percutaneous right nephrostomy with right ureter catheter due to urosepsis on top of right uretor stone,  pt VS: , RR 24, sat 97% on 4 L NC, /68, Temp 101.6F  pt tachyacrdia likley due to fever and sepsis  pt given 1 L of bolus, Toradol 30mg, tachycardia but normal sinus, not on RS distress,     stat labs sent, pt HR now 120, O2 sat 96% 4L NC, /68  will continue to monitor and follow up labs

## 2020-05-21 LAB
-  AMIKACIN: SIGNIFICANT CHANGE UP
-  AMPICILLIN/SULBACTAM: SIGNIFICANT CHANGE UP
-  AMPICILLIN: SIGNIFICANT CHANGE UP
-  AZTREONAM: SIGNIFICANT CHANGE UP
-  CANDIDA ALBICANS: SIGNIFICANT CHANGE UP
-  CANDIDA GLABRATA: SIGNIFICANT CHANGE UP
-  CANDIDA KRUSEI: SIGNIFICANT CHANGE UP
-  CANDIDA PARAPSILOSIS: SIGNIFICANT CHANGE UP
-  CANDIDA TROPICALIS: SIGNIFICANT CHANGE UP
-  CEFAZOLIN: SIGNIFICANT CHANGE UP
-  CEFEPIME: SIGNIFICANT CHANGE UP
-  CEFOXITIN: SIGNIFICANT CHANGE UP
-  CEFTRIAXONE: SIGNIFICANT CHANGE UP
-  CIPROFLOXACIN: SIGNIFICANT CHANGE UP
-  COAGULASE NEGATIVE STAPHYLOCOCCUS: SIGNIFICANT CHANGE UP
-  ERTAPENEM: SIGNIFICANT CHANGE UP
-  GENTAMICIN: SIGNIFICANT CHANGE UP
-  K. PNEUMONIAE GROUP: SIGNIFICANT CHANGE UP
-  KPC RESISTANCE GENE: SIGNIFICANT CHANGE UP
-  LEVOFLOXACIN: SIGNIFICANT CHANGE UP
-  MEROPENEM: SIGNIFICANT CHANGE UP
-  NITROFURANTOIN: SIGNIFICANT CHANGE UP
-  PIPERACILLIN/TAZOBACTAM: SIGNIFICANT CHANGE UP
-  STREPTOCOCCUS SP. (NOT GRP A, B OR S PNEUMONIAE): SIGNIFICANT CHANGE UP
-  TOBRAMYCIN: SIGNIFICANT CHANGE UP
-  TRIMETHOPRIM/SULFAMETHOXAZOLE: SIGNIFICANT CHANGE UP
A BAUMANNII DNA SPEC QL NAA+PROBE: SIGNIFICANT CHANGE UP
ALBUMIN SERPL ELPH-MCNC: 2.8 G/DL — LOW (ref 3.5–5.2)
ALP SERPL-CCNC: 68 U/L — SIGNIFICANT CHANGE UP (ref 30–115)
ALT FLD-CCNC: 25 U/L — SIGNIFICANT CHANGE UP (ref 0–41)
ANION GAP SERPL CALC-SCNC: 13 MMOL/L — SIGNIFICANT CHANGE UP (ref 7–14)
APTT BLD: 29.8 SEC — SIGNIFICANT CHANGE UP (ref 27–39.2)
AST SERPL-CCNC: 30 U/L — SIGNIFICANT CHANGE UP (ref 0–41)
BASOPHILS # BLD AUTO: 0.01 K/UL — SIGNIFICANT CHANGE UP (ref 0–0.2)
BASOPHILS NFR BLD AUTO: 0.2 % — SIGNIFICANT CHANGE UP (ref 0–1)
BILIRUB SERPL-MCNC: 0.3 MG/DL — SIGNIFICANT CHANGE UP (ref 0.2–1.2)
BUN SERPL-MCNC: 13 MG/DL — SIGNIFICANT CHANGE UP (ref 10–20)
CALCIUM SERPL-MCNC: 8.2 MG/DL — LOW (ref 8.5–10.1)
CHLORIDE SERPL-SCNC: 106 MMOL/L — SIGNIFICANT CHANGE UP (ref 98–110)
CO2 SERPL-SCNC: 25 MMOL/L — SIGNIFICANT CHANGE UP (ref 17–32)
CREAT SERPL-MCNC: 0.6 MG/DL — LOW (ref 0.7–1.5)
CULTURE RESULTS: SIGNIFICANT CHANGE UP
CULTURE RESULTS: SIGNIFICANT CHANGE UP
E CLOAC COMP DNA BLD POS QL NAA+PROBE: SIGNIFICANT CHANGE UP
E COLI DNA BLD POS QL NAA+NON-PROBE: SIGNIFICANT CHANGE UP
ENTEROCOC DNA BLD POS QL NAA+NON-PROBE: SIGNIFICANT CHANGE UP
ENTEROCOC DNA BLD POS QL NAA+NON-PROBE: SIGNIFICANT CHANGE UP
EOSINOPHIL # BLD AUTO: 0.08 K/UL — SIGNIFICANT CHANGE UP (ref 0–0.7)
EOSINOPHIL NFR BLD AUTO: 1.3 % — SIGNIFICANT CHANGE UP (ref 0–8)
GLUCOSE SERPL-MCNC: 92 MG/DL — SIGNIFICANT CHANGE UP (ref 70–99)
GP B STREP DNA BLD POS QL NAA+NON-PROBE: SIGNIFICANT CHANGE UP
GRAM STN FLD: SIGNIFICANT CHANGE UP
HAEM INFLU DNA BLD POS QL NAA+NON-PROBE: SIGNIFICANT CHANGE UP
HCT VFR BLD CALC: 31.4 % — LOW (ref 42–52)
HGB BLD-MCNC: 10.6 G/DL — LOW (ref 14–18)
IMM GRANULOCYTES NFR BLD AUTO: 0.3 % — SIGNIFICANT CHANGE UP (ref 0.1–0.3)
INR BLD: 1.28 RATIO — SIGNIFICANT CHANGE UP (ref 0.65–1.3)
K OXYTOCA DNA BLD POS QL NAA+NON-PROBE: SIGNIFICANT CHANGE UP
L MONOCYTOG DNA BLD POS QL NAA+NON-PROBE: SIGNIFICANT CHANGE UP
LYMPHOCYTES # BLD AUTO: 0.9 K/UL — LOW (ref 1.2–3.4)
LYMPHOCYTES # BLD AUTO: 14.6 % — LOW (ref 20.5–51.1)
MAGNESIUM SERPL-MCNC: 2 MG/DL — SIGNIFICANT CHANGE UP (ref 1.8–2.4)
MCHC RBC-ENTMCNC: 32.5 PG — HIGH (ref 27–31)
MCHC RBC-ENTMCNC: 33.8 G/DL — SIGNIFICANT CHANGE UP (ref 32–37)
MCV RBC AUTO: 96.3 FL — HIGH (ref 80–94)
METHOD TYPE: SIGNIFICANT CHANGE UP
MONOCYTES # BLD AUTO: 0.73 K/UL — HIGH (ref 0.1–0.6)
MONOCYTES NFR BLD AUTO: 11.9 % — HIGH (ref 1.7–9.3)
MRSA SPEC QL CULT: SIGNIFICANT CHANGE UP
MSSA DNA SPEC QL NAA+PROBE: SIGNIFICANT CHANGE UP
N MEN ISLT CULT: SIGNIFICANT CHANGE UP
NEUTROPHILS # BLD AUTO: 4.41 K/UL — SIGNIFICANT CHANGE UP (ref 1.4–6.5)
NEUTROPHILS NFR BLD AUTO: 71.7 % — SIGNIFICANT CHANGE UP (ref 42.2–75.2)
NRBC # BLD: 0 /100 WBCS — SIGNIFICANT CHANGE UP (ref 0–0)
ORGANISM # SPEC MICROSCOPIC CNT: SIGNIFICANT CHANGE UP
P AERUGINOSA DNA BLD POS NAA+NON-PROBE: SIGNIFICANT CHANGE UP
PLATELET # BLD AUTO: 141 K/UL — SIGNIFICANT CHANGE UP (ref 130–400)
POTASSIUM SERPL-MCNC: 4.1 MMOL/L — SIGNIFICANT CHANGE UP (ref 3.5–5)
POTASSIUM SERPL-SCNC: 4.1 MMOL/L — SIGNIFICANT CHANGE UP (ref 3.5–5)
PROT SERPL-MCNC: 5.8 G/DL — LOW (ref 6–8)
PROTHROM AB SERPL-ACNC: 14.7 SEC — HIGH (ref 9.95–12.87)
RBC # BLD: 3.26 M/UL — LOW (ref 4.7–6.1)
RBC # FLD: 14.4 % — SIGNIFICANT CHANGE UP (ref 11.5–14.5)
S MARCESCENS DNA BLD POS NAA+NON-PROBE: SIGNIFICANT CHANGE UP
S PNEUM DNA BLD POS QL NAA+NON-PROBE: SIGNIFICANT CHANGE UP
S PYO DNA BLD POS QL NAA+NON-PROBE: SIGNIFICANT CHANGE UP
SODIUM SERPL-SCNC: 144 MMOL/L — SIGNIFICANT CHANGE UP (ref 135–146)
SPECIMEN SOURCE: SIGNIFICANT CHANGE UP
WBC # BLD: 6.15 K/UL — SIGNIFICANT CHANGE UP (ref 4.8–10.8)
WBC # FLD AUTO: 6.15 K/UL — SIGNIFICANT CHANGE UP (ref 4.8–10.8)

## 2020-05-21 PROCEDURE — 99233 SBSQ HOSP IP/OBS HIGH 50: CPT

## 2020-05-21 PROCEDURE — 99222 1ST HOSP IP/OBS MODERATE 55: CPT

## 2020-05-21 PROCEDURE — 99232 SBSQ HOSP IP/OBS MODERATE 35: CPT

## 2020-05-21 PROCEDURE — 74018 RADEX ABDOMEN 1 VIEW: CPT | Mod: 26

## 2020-05-21 PROCEDURE — 71045 X-RAY EXAM CHEST 1 VIEW: CPT | Mod: 26

## 2020-05-21 RX ORDER — ENOXAPARIN SODIUM 100 MG/ML
40 INJECTION SUBCUTANEOUS AT BEDTIME
Refills: 0 | Status: DISCONTINUED | OUTPATIENT
Start: 2020-05-21 | End: 2020-05-26

## 2020-05-21 RX ORDER — ENOXAPARIN SODIUM 100 MG/ML
40 INJECTION SUBCUTANEOUS AT BEDTIME
Refills: 0 | Status: DISCONTINUED | OUTPATIENT
Start: 2020-05-21 | End: 2020-05-21

## 2020-05-21 RX ORDER — PHENOBARBITAL 60 MG
64.8 TABLET ORAL ONCE
Refills: 0 | Status: DISCONTINUED | OUTPATIENT
Start: 2020-05-21 | End: 2020-05-21

## 2020-05-21 RX ORDER — LACTULOSE 10 G/15ML
20 SOLUTION ORAL ONCE
Refills: 0 | Status: COMPLETED | OUTPATIENT
Start: 2020-05-21 | End: 2020-05-21

## 2020-05-21 RX ORDER — MAGNESIUM OXIDE 400 MG ORAL TABLET 241.3 MG
400 TABLET ORAL
Refills: 0 | Status: COMPLETED | OUTPATIENT
Start: 2020-05-21 | End: 2020-05-21

## 2020-05-21 RX ORDER — SODIUM CHLORIDE 9 MG/ML
1000 INJECTION, SOLUTION INTRAVENOUS
Refills: 0 | Status: DISCONTINUED | OUTPATIENT
Start: 2020-05-21 | End: 2020-05-26

## 2020-05-21 RX ADMIN — Medication 10 MILLIGRAM(S): at 12:26

## 2020-05-21 RX ADMIN — MAGNESIUM OXIDE 400 MG ORAL TABLET 400 MILLIGRAM(S): 241.3 TABLET ORAL at 12:25

## 2020-05-21 RX ADMIN — Medication 1 TABLET(S): at 17:51

## 2020-05-21 RX ADMIN — Medication 1 DROP(S): at 12:46

## 2020-05-21 RX ADMIN — Medication 10 MILLIGRAM(S): at 21:22

## 2020-05-21 RX ADMIN — MAGNESIUM OXIDE 400 MG ORAL TABLET 400 MILLIGRAM(S): 241.3 TABLET ORAL at 06:36

## 2020-05-21 RX ADMIN — MEROPENEM 100 MILLIGRAM(S): 1 INJECTION INTRAVENOUS at 21:23

## 2020-05-21 RX ADMIN — Medication 64.8 MILLIGRAM(S): at 12:25

## 2020-05-21 RX ADMIN — Medication 64.8 MILLIGRAM(S): at 06:34

## 2020-05-21 RX ADMIN — MEROPENEM 100 MILLIGRAM(S): 1 INJECTION INTRAVENOUS at 06:12

## 2020-05-21 RX ADMIN — LACTULOSE 20 GRAM(S): 10 SOLUTION ORAL at 12:26

## 2020-05-21 RX ADMIN — Medication 1 TABLET(S): at 06:13

## 2020-05-21 RX ADMIN — POLYETHYLENE GLYCOL 3350 17 GRAM(S): 17 POWDER, FOR SOLUTION ORAL at 17:50

## 2020-05-21 RX ADMIN — SENNA PLUS 2 TABLET(S): 8.6 TABLET ORAL at 21:22

## 2020-05-21 RX ADMIN — Medication 10 MILLIGRAM(S): at 06:13

## 2020-05-21 RX ADMIN — Medication 1 DROP(S): at 01:12

## 2020-05-21 RX ADMIN — Medication 10 MILLIGRAM(S): at 13:04

## 2020-05-21 RX ADMIN — MAGNESIUM OXIDE 400 MG ORAL TABLET 400 MILLIGRAM(S): 241.3 TABLET ORAL at 17:51

## 2020-05-21 RX ADMIN — Medication 1 DROP(S): at 06:36

## 2020-05-21 RX ADMIN — TAMSULOSIN HYDROCHLORIDE 0.4 MILLIGRAM(S): 0.4 CAPSULE ORAL at 21:24

## 2020-05-21 RX ADMIN — Medication 1 DROP(S): at 17:51

## 2020-05-21 RX ADMIN — ENOXAPARIN SODIUM 40 MILLIGRAM(S): 100 INJECTION SUBCUTANEOUS at 21:22

## 2020-05-21 RX ADMIN — MEROPENEM 100 MILLIGRAM(S): 1 INJECTION INTRAVENOUS at 12:40

## 2020-05-21 NOTE — PROGRESS NOTE ADULT - ASSESSMENT
63 y/o male with PMH of cerebral palsy, seizure disorder, spastic paraplegia, s/p PEG tube, Asthma, H/O nephrolithiasis, BPH with bladder neck stricture s/p suprapubic catheter, and H/O Pseudomonas and ESBL Proteus mirabilis bacteruria who presented from a group with abdominal pain x1 days found to have R sided nephrolithiasis with hydronephrosis. Pt also has recent URI symptoms r/o COVID.     IMPRESSION;   Resolved sepsis secondary to acute Right pyelonephritis with right hydronephrosis with right ureteral stone  S/p R PCN by IVR 5/20  BCx 5/19 CoNS ( not a true pathogen)  Still await UCx  CT A/P - right hydronephrosis with a 9 x 7 x 10 mm proximal right ureteral calculus and ureteral enhancement concerning for infection   COVID-19     RECOMMENDATIONS;  UCx  No need to repeat BCx  Meropenem 1 gm iv q8h ( 3/24 UCx ESBL P mirabils, P aeruginosa )

## 2020-05-21 NOTE — CONSULT NOTE ADULT - SUBJECTIVE AND OBJECTIVE BOX
Patient is a 64y old  Male who presents with a chief complaint of sepsis (21 May 2020 09:00)      HPI:  Patient is a 63 y/o male with PMH of cerebral palsy, seizure disorder, spastic paraplegia, s/p PEG tube, Asthma, H/O nephrolithiasis, BPH with bladder neck stricture s/p suprapubic catheter, and H/O Pseudomonas and ESBL Proteus mirabilis bacteruria who presented from a group with abdominal pain x1 days. Pt states that he started getting a R sided flank pain with radiation to his pelvis after getting his suprapubic cathter tube changes. Pt states that the pain is sharp and constant. Pt also endorses some nausea and vomited x1. He also has some pain at the catheter insertion site however catheter has been draining well. Off note pt has a progressive worsening congestion for the last few days. As per aid, pts group home had a resident test postive for COVID. Pt was likely in close contact with resident. Pt also endorses recent subjective fevers. He has had a cough for the last month, pt was recently admitted to the ICU for non-COVID coronavirus bronchitis, never intubated. Denies any other complaints. Denies any chills, changes in weight, chest pain, SOB, diarrhea, myalgias, arthralgias syncope, fatigue.   In the ED, /67  RR 18 SpO2 94 on RA afebrile. CT A/P showed a [id number] proximal right ureteral stone with mod hydronephrosis. Urolgoy was consulted. Pt was given IVF, pain control and Merox1. Swabbed for COVID. (19 May 2020 08:44)      PAST MEDICAL & SURGICAL HISTORY:  Asthma  Urinary retention  Urinary calculi  Spastic quadriplegia  Osteoporosis  Seizure: last seizure &gt;10 years ago  Cerebral palsy  BPH (benign prostatic hyperplasia)  Suprapubic catheter  H/O cystoscopy  S/P percutaneous endoscopic gastrostomy (PEG) tube placement      SOCIAL HX:   Smoking    -ve                     ETOH       -ve                     Other  -ve    FAMILY HISTORY:  Family history unknown  :  No known cardiovacular family hisotry     ROS:  See HPI     Allergies    No Known Allergies    Intolerances          PHYSICAL EXAM    ICU Vital Signs Last 24 Hrs  T(C): 36.1 (21 May 2020 06:56), Max: 37.1 (20 May 2020 17:27)  T(F): 96.9 (21 May 2020 06:56), Max: 98.7 (20 May 2020 17:27)  HR: 93 (21 May 2020 06:56) (57 - 105)  BP: 117/61 (21 May 2020 06:56) (90/57 - 117/61)  RR: 22 (21 May 2020 06:56) (22 - 29)  SpO2: 100% (20 May 2020 17:59) (100% - 100%)      General: In NAD   HEENT:  MNOTEZ              Lungs: ronchi  Cardiovascular: Regular  Gastrointestinal: Soft, Positive BS  Musculoskeletal:paraparesis   Skin: Warm.  Intact  Neurological: awake, alert, responds to site      05-20-20 @ 07:01  -  05-21-20 @ 07:00  --------------------------------------------------------  IN:    Lactated Ringers IV Bolus: 1000 mL    Other: 450 mL  Total IN: 1450 mL    OUT:    Indwelling Catheter - Suprapubic: 1250 mL    Other: 450 mL  Total OUT: 1700 mL    Total NET: -250 mL      05-21-20 @ 07:01  -  05-21-20 @ 13:26  --------------------------------------------------------  IN:    lactated ringers.: 275 mL  Total IN: 275 mL    OUT:    Nephrostomy Tube: 1350 mL  Total OUT: 1350 mL    Total NET: -1075 mL          LABS:                          10.6   6.15  )-----------( 141      ( 21 May 2020 05:21 )             31.4                                               05-21    144  |  106  |  13  ----------------------------<  92  4.1   |  25  |  0.6<L>    Ca    8.2<L>      21 May 2020 05:21  Phos  2.0     05-20  Mg     2.0     05-21    TPro  5.8<L>  /  Alb  2.8<L>  /  TBili  0.3  /  DBili  x   /  AST  30  /  ALT  25  /  AlkPhos  68  05-21      PT/INR - ( 21 May 2020 05:21 )   PT: 14.70 sec;   INR: 1.28 ratio         PTT - ( 21 May 2020 05:21 )  PTT:29.8 sec                                                                                     LIVER FUNCTIONS - ( 21 May 2020 05:21 )  Alb: 2.8 g/dL / Pro: 5.8 g/dL / ALK PHOS: 68 U/L / ALT: 25 U/L / AST: 30 U/L / GGT: x                                                  Culture - Blood (collected 19 May 2020 11:18)  Source: .Blood None  Gram Stain (20 May 2020 19:57):    Growth in anaerobic bottle: Gram Positive Cocci in Clusters  Preliminary Report (20 May 2020 19:57):    Growth in anaerobic bottle: Gram Positive Cocci in Clusters    ***Blood Panel PCR results on this specimen are available    approximately 3 hours after the Gram stain result.***    Gram stain, PCR, and/or culture results may not always    correspond due todifference in methodologies.    ************************************************************    This PCR assay was performed using [employer].    The following targets are tested for: Enterococcus,    vancomycin resistant enterococci, Listeria monocytogenes,    coagulase negative staphylococci, S. aureus,    methicillin resistant S. aureus, Streptococcus agalactiae    (Group B), S. pneumoniae, S. pyogenes (Group A),    Acinetobacter baumannii, Enterobacter cloacae, E. coli,    Klebsiella oxytoca, K. pneumoniae, Proteus sp.,    Serratia marcescens, Haemophilus influenzae,    Neisseria meningitidis, Pseudomonas aeruginosa, Candida    albicans, C. glabrata, C krusei, C parapsilosis,    C. tropicalis and the KPC resistance gene.    "Due to technical problems, Proteussp. will Not be reported as part of    the BCID panel until further notice"  Organism: Blood Culture PCR (20 May 2020 21:19)  Organism: Blood Culture PCR (20 May 2020 21:19)                                                                                       ABG - ( 20 May 2020 21:32 )  pH, Arterial: 7.45  pH, Blood: x     /  pCO2: 39    /  pO2: 104   / HCO3: 27    / Base Excess: ?3.1  /  SaO2: ?98.3               X-Rays                                                                                     ECHO    MEDICATIONS  (STANDING):  baclofen 10 milliGRAM(s) Oral three times a day  calcium carbonate   1250 mG (OsCal) 1 Tablet(s) Oral two times a day  dicyclomine 10 milliGRAM(s) Oral daily  enoxaparin Injectable 40 milliGRAM(s) SubCutaneous at bedtime  lactated ringers. 1000 milliLiter(s) (75 mL/Hr) IV Continuous <Continuous>  magnesium oxide 400 milliGRAM(s) Oral three times a day with meals  meropenem  IVPB 1000 milliGRAM(s) IV Intermittent every 8 hours  PHENobarbital 64.8 milliGRAM(s) Oral daily  polyethylene glycol 3350 17 Gram(s) Oral every 12 hours  polyvinyl alcohol 1.4%/povidone 0.6% Ophthalmic Solution - Peds 1 Drop(s) Both EYES four times a day  senna 2 Tablet(s) Oral at bedtime  tamsulosin 0.4 milliGRAM(s) Oral at bedtime    MEDICATIONS  (PRN):  acetaminophen   Tablet .. 650 milliGRAM(s) Oral every 6 hours PRN Temp greater or equal to 38C (100.4F)  ALBUTerol    90 MICROgram(s) HFA Inhaler 2 Puff(s) Inhalation every 6 hours PRN Shortness of Breath and/or Wheezing  benzonatate 100 milliGRAM(s) Oral every 8 hours PRN Cough  ketorolac   Injectable 30 milliGRAM(s) IV Push every 6 hours PRN Severe Pain (7 - 10)  ondansetron Injectable 4 milliGRAM(s) IV Push every 6 hours PRN Nausea Patient is a 64y old  Male who presents with a chief complaint of abd pain (21 May 2020 09:00)      HPI:  Patient is a 63 y/o male with PMH of cerebral palsy, seizure disorder, spastic paraplegia, s/p PEG tube, Asthma, H/O nephrolithiasis, BPH with bladder neck stricture s/p suprapubic catheter, and H/O Pseudomonas and ESBL Proteus mirabilis bacteruria who presented from a group with abdominal pain x1 days. Pt states that he started getting a R sided flank pain with radiation to his pelvis after getting his suprapubic cathter tube changes. Pt states that the pain is sharp and constant. Pt also endorses some nausea and vomited x1. He also has some pain at the catheter insertion site however catheter has been draining well. Off note pt has a progressive worsening congestion for the last few days. As per aid, pts group home had a resident test postive for COVID. Pt was likely in close contact with resident. Pt also endorses recent subjective fevers. He has had a cough for the last month, pt was recently admitted to the ICU for non-COVID coronavirus bronchitis, never intubated. Denies any other complaints. Denies any chills, changes in weight, chest pain, SOB, diarrhea, myalgias, arthralgias syncope, fatigue.   In the ED, /67  RR 18 SpO2 94 on RA afebrile. CT A/P showed a [id number] proximal right ureteral stone with mod hydronephrosis. Urolgoy was consulted. Pt was given IVF, pain control and Merox1. high LA, called for MICU      PAST MEDICAL & SURGICAL HISTORY:  Asthma  Urinary retention  Urinary calculi  Spastic quadriplegia  Osteoporosis  Seizure: last seizure &gt;10 years ago  Cerebral palsy  BPH (benign prostatic hyperplasia)  Suprapubic catheter  H/O cystoscopy  S/P percutaneous endoscopic gastrostomy (PEG) tube placement      SOCIAL HX:   Smoking    -ve                     ETOH       -ve                     Other  -ve    FAMILY HISTORY:  Family history unknown  :  No known cardiovacular family hisotry     ROS:  See HPI     Allergies    No Known Allergies    Intolerances          PHYSICAL EXAM    ICU Vital Signs Last 24 Hrs  T(C): 36.1 (21 May 2020 06:56), Max: 37.1 (20 May 2020 17:27)  T(F): 96.9 (21 May 2020 06:56), Max: 98.7 (20 May 2020 17:27)  HR: 93 (21 May 2020 06:56) (57 - 105)  BP: 117/61 (21 May 2020 06:56) (90/57 - 117/61)  RR: 22 (21 May 2020 06:56) (22 - 29)  SpO2: 100% (20 May 2020 17:59) (100% - 100%)      General: In NAD, Chronically ill looking  HEENT:  MONTEZ              Lungs dec bs l side  Cardiovascular: Regular  Gastrointestinal: Soft, Positive BS, peg, suprapubic catheter  Musculoskeletal: paraparesis / contracted  Skin: Warm.  Intact  Neurological: awake, follows simple commands      05-20-20 @ 07:01  -  05-21-20 @ 07:00  --------------------------------------------------------  IN:    Lactated Ringers IV Bolus: 1000 mL    Other: 450 mL  Total IN: 1450 mL    OUT:    Indwelling Catheter - Suprapubic: 1250 mL    Other: 450 mL  Total OUT: 1700 mL    Total NET: -250 mL      05-21-20 @ 07:01  -  05-21-20 @ 13:26  --------------------------------------------------------  IN:    lactated ringers.: 275 mL  Total IN: 275 mL    OUT:    Nephrostomy Tube: 1350 mL  Total OUT: 1350 mL    Total NET: -1075 mL          LABS:                          10.6   6.15  )-----------( 141      ( 21 May 2020 05:21 )             31.4                                               05-21    144  |  106  |  13  ----------------------------<  92  4.1   |  25  |  0.6<L>    Ca    8.2<L>      21 May 2020 05:21  Phos  2.0     05-20  Mg     2.0     05-21    TPro  5.8<L>  /  Alb  2.8<L>  /  TBili  0.3  /  DBili  x   /  AST  30  /  ALT  25  /  AlkPhos  68  05-21      PT/INR - ( 21 May 2020 05:21 )   PT: 14.70 sec;   INR: 1.28 ratio         PTT - ( 21 May 2020 05:21 )  PTT:29.8 sec                                                                                     LIVER FUNCTIONS - ( 21 May 2020 05:21 )  Alb: 2.8 g/dL / Pro: 5.8 g/dL / ALK PHOS: 68 U/L / ALT: 25 U/L / AST: 30 U/L / GGT: x                                                  Culture - Blood (collected 19 May 2020 11:18)  Source: .Blood None  Gram Stain (20 May 2020 19:57):    Growth in anaerobic bottle: Gram Positive Cocci in Clusters  Preliminary Report (20 May 2020 19:57):    Growth in anaerobic bottle: Gram Positive Cocci in Clusters    ***Blood Panel PCR results on this specimen are available    approximately 3 hours after the Gram stain result.***    Gram stain, PCR, and/or culture results may not always    correspond due todifference in methodologies.    ************************************************************    This PCR assay was performed using [employer].    The following targets are tested for: Enterococcus,    vancomycin resistant enterococci, Listeria monocytogenes,    coagulase negative staphylococci, S. aureus,    methicillin resistant S. aureus, Streptococcus agalactiae    (Group B), S. pneumoniae, S. pyogenes (Group A),    Acinetobacter baumannii, Enterobacter cloacae, E. coli,    Klebsiella oxytoca, K. pneumoniae, Proteus sp.,    Serratia marcescens, Haemophilus influenzae,    Neisseria meningitidis, Pseudomonas aeruginosa, Candida    albicans, C. glabrata, C krusei, C parapsilosis,    C. tropicalis and the KPC resistance gene.    "Due to technical problems, Proteussp. will Not be reported as part of    the BCID panel until further notice"  Organism: Blood Culture PCR (20 May 2020 21:19)  Organism: Blood Culture PCR (20 May 2020 21:19)                                                                                       ABG - ( 20 May 2020 21:32 )  pH, Arterial: 7.45  pH, Blood: x     /  pCO2: 39    /  pO2: 104   / HCO3: 27    / Base Excess: ?3.1  /  SaO2: ?98.3               X-Rays  reviewed, CT reviewed    MEDICATIONS  (STANDING):  baclofen 10 milliGRAM(s) Oral three times a day  calcium carbonate   1250 mG (OsCal) 1 Tablet(s) Oral two times a day  dicyclomine 10 milliGRAM(s) Oral daily  enoxaparin Injectable 40 milliGRAM(s) SubCutaneous at bedtime  lactated ringers. 1000 milliLiter(s) (75 mL/Hr) IV Continuous <Continuous>  magnesium oxide 400 milliGRAM(s) Oral three times a day with meals  meropenem  IVPB 1000 milliGRAM(s) IV Intermittent every 8 hours  PHENobarbital 64.8 milliGRAM(s) Oral daily  polyethylene glycol 3350 17 Gram(s) Oral every 12 hours  polyvinyl alcohol 1.4%/povidone 0.6% Ophthalmic Solution - Peds 1 Drop(s) Both EYES four times a day  senna 2 Tablet(s) Oral at bedtime  tamsulosin 0.4 milliGRAM(s) Oral at bedtime    MEDICATIONS  (PRN):  acetaminophen   Tablet .. 650 milliGRAM(s) Oral every 6 hours PRN Temp greater or equal to 38C (100.4F)  ALBUTerol    90 MICROgram(s) HFA Inhaler 2 Puff(s) Inhalation every 6 hours PRN Shortness of Breath and/or Wheezing  benzonatate 100 milliGRAM(s) Oral every 8 hours PRN Cough  ketorolac   Injectable 30 milliGRAM(s) IV Push every 6 hours PRN Severe Pain (7 - 10)  ondansetron Injectable 4 milliGRAM(s) IV Push every 6 hours PRN Nausea

## 2020-05-21 NOTE — PROGRESS NOTE ADULT - SUBJECTIVE AND OBJECTIVE BOX
Patient is a 64y old  Male who presents with a chief complaint of pain (20 May 2020 17:37)      OVERNIGHT EVENTS: reports feeling better, s/p R PCT nephrostomy, nephroureteral stent with ext ureter drainage, tachycardia with elevated lactate     SUBJECTIVE / INTERVAL HPI: Patient seen and examined at bedside.     VITAL SIGNS:  Vital Signs Last 24 Hrs  T(C): 36.1 (21 May 2020 06:56), Max: 38.7 (20 May 2020 12:50)  T(F): 96.9 (21 May 2020 06:56), Max: 101.6 (20 May 2020 12:50)  HR: 93 (21 May 2020 06:56) (57 - 111)  BP: 117/61 (21 May 2020 06:56) (90/57 - 117/61)  BP(mean): --  RR: 22 (21 May 2020 06:56) (20 - 29)  SpO2: 100% (20 May 2020 17:59) (100% - 100%)    PHYSICAL EXAM:    General: WDWN, verbal,   HEENT: NC/AT; PERRL, clear conjunctiva  Neck: supple  Cardiovascular: +S1/S2; RRR  Respiratory: CTA b/l; no W/R/R  Gastrointestinal: soft, NT, abd looks distended but better than yesterday, ND; +BSx4, PEG tube, R ureter external drainage, urine looks clear   Extremities: WWP; 2+ peripheral pulses; no edema   Neurological: AAOx3; contracted extremities     MEDICATIONS:  MEDICATIONS  (STANDING):  baclofen 10 milliGRAM(s) Oral three times a day  calcium carbonate   1250 mG (OsCal) 1 Tablet(s) Oral two times a day  dicyclomine 10 milliGRAM(s) Oral daily  lactated ringers. 1000 milliLiter(s) (125 mL/Hr) IV Continuous <Continuous>  magnesium oxide 400 milliGRAM(s) Oral three times a day with meals  meropenem  IVPB 1000 milliGRAM(s) IV Intermittent every 8 hours  PHENobarbital 64.8 milliGRAM(s) Oral daily  polyethylene glycol 3350 17 Gram(s) Oral every 12 hours  polyvinyl alcohol 1.4%/povidone 0.6% Ophthalmic Solution - Peds 1 Drop(s) Both EYES four times a day  senna 2 Tablet(s) Oral at bedtime  tamsulosin 0.4 milliGRAM(s) Oral at bedtime    MEDICATIONS  (PRN):  acetaminophen   Tablet .. 650 milliGRAM(s) Oral every 6 hours PRN Temp greater or equal to 38C (100.4F)  ALBUTerol    90 MICROgram(s) HFA Inhaler 2 Puff(s) Inhalation every 6 hours PRN Shortness of Breath and/or Wheezing  benzonatate 100 milliGRAM(s) Oral every 8 hours PRN Cough  ketorolac   Injectable 30 milliGRAM(s) IV Push every 6 hours PRN Severe Pain (7 - 10)  ondansetron Injectable 4 milliGRAM(s) IV Push every 6 hours PRN Nausea      ALLERGIES:  Allergies    No Known Allergies    Intolerances        LABS:                        10.6   6.15  )-----------( 141      ( 21 May 2020 05:21 )             31.4     05-21    144  |  106  |  13  ----------------------------<  92  4.1   |  25  |  0.6<L>    Ca    8.2<L>      21 May 2020 05:21  Phos  2.0     05-20  Mg     1.7     05-20    TPro  5.8<L>  /  Alb  2.8<L>  /  TBili  0.3  /  DBili  x   /  AST  30  /  ALT  25  /  AlkPhos  68  05-21    PT/INR - ( 21 May 2020 05:21 )   PT: 14.70 sec;   INR: 1.28 ratio         PTT - ( 21 May 2020 05:21 )  PTT:29.8 sec  < from: CT Abdomen and Pelvis w/ IV Cont (05.19.20 @ 04:42) >  IMPRESSION:    Moderate right hydronephrosis with delayed right renal nephrogram secondary to a 9 x 7 x 10 mm proximal right ureteral calculus. Right ureteral enhancement may be reactive or reflect superimposed infection.    < end of copied text >  Culture - Blood (05.19.20 @ 11:18)    Gram Stain:   Growth in anaerobic bottle: Gram Positive Cocci in Clusters    -  Coagulase negative Staphylococcus: Detec    Specimen Source: .Blood None    Organism: Blood Culture PCR    Culture Results:   Growth in anaerobic bottle: Gram Positive Cocci in Clusters  ***Blood Panel PCR results on this specimen are available  approximately 3 hours after the Gram stain result.***  Gram stain, PCR, and/or culture results may not always  correspond due todifference in methodologies.  ************************************************************  This PCR assay was performed using [employer].  The following targets are tested for: Enterococcus,  vancomycin resistant enterococci, Listeria monocytogenes,  coagulase negative staphylococci, S. aureus,  methicillin resistant S. aureus, Streptococcus agalactiae  (Group B), S. pneumoniae, S. pyogenes (Group A),  Acinetobacter baumannii, Enterobacter cloacae, E. coli,  Klebsiella oxytoca, K. pneumoniae, Proteus sp.,  Serratia marcescens, Haemophilus influenzae,  Neisseria meningitidis, Pseudomonas aeruginosa, Candida  albicans, C. glabrata, C krusei, C parapsilosis,  C. tropicalis and the KPC resistance gene.  "Due to technical problems, Proteussp. will Not be reported as part of  the BCID panel until further notice"    Organism Identification: Blood Culture PCR    Method Type: PCR

## 2020-05-21 NOTE — CONSULT NOTE ADULT - ASSESSMENT
Assessment:    Sepsis 2ry to obstructing kidney stones  Seizures  nephrolithiasis s/p nephrostomy tube  LANA improved    PLAN:    CNS: avoid sedation, F/u neurology, continue AED    HEENT:  Oral care    PULMONARY:  HOB @ 45 degrees    CARDIOVASCULAR: I=O    GI: GI prophylaxis                                          Feeding PEG tube feeds    RENAL:  F/u urology    INFECTIOUS DISEASE: continue meropenem, repeat blood culture(gram +ve in blood coag -ve staph)    HEMATOLOGICAL:  DVT prophylaxis.    ENDOCRINE:  Follow up FS.  Insulin protocol if needed.    MUSCULOSKELETAL:    CODE STATUS: FULL CODE    DISPOSITION: Patient is not candidate for MICU. Assessment:    Sepsis 2ry to obstructing kidney stones  Seizures  nephrolithiasis s/p nephrostomy tube  LANA improved  lactic acidosis resloved    PLAN:    CNS: avoid sedation, F/u neurology, continue AED    HEENT:  Oral care    PULMONARY:  HOB @ 45 degrees, aspiration precaution    CARDIOVASCULAR: IVF +    GI: GI prophylaxis                                          Feeding PEG tube feeds    RENAL:  F/u urology    INFECTIOUS DISEASE: continue meropenem, repeat blood culture(gram +ve in blood coag -ve staph), ID eval    HEMATOLOGICAL:  DVT prophylaxis.    ENDOCRINE:  Follow up FS.  Insulin protocol if needed.    MUSCULOSKELETAL:    CODE STATUS: FULL CODE    DISPOSITION: Patient is not candidate for MICU for now, recall as needed

## 2020-05-21 NOTE — DIETITIAN INITIAL EVALUATION ADULT. - CONTINUE CURRENT NUTRITION CARE PLAN
pt to meet and tolerate >85% of estimated kcal/protein via TF upon f/u in 4 days. Enteral nutrition. RD to monitor diet order, energy intake, body composition, NFPF (EN tolerance, electrolytes)

## 2020-05-21 NOTE — CONSULT NOTE ADULT - ASSESSMENT
Assessment:  Preop clearance for Percutaneous nephrolithotomy    Plan: Assessment:  Preop clearance for Percutaneous nephrolithotomy    Plan:    No acute cardiac contraindications for the planned procedure.  Echo and ECG noted.  ET less than 4 METS due to neurological condition  urgent procedure  IV Hydration  No indication for stress test as per AHA/ACC guidelines  Intermediate cardiac risk.

## 2020-05-21 NOTE — CONSULT NOTE ADULT - ATTENDING COMMENTS
patient seen and examined, agree with above, sepsis, renal stone / hydrone moderate, IV ABX, IVF, urology eval, no need fo MICU recall as needed
I have personally seen and examined this patient.  I have fully participated in the care of this patient.  I have reviewed all pertinent clinical information, including history, physical exam, plan and note.   I have reviewed all pertinent clinical information and reviewed all relevant imaging and diagnostic studies personally.  64-year-old male with h/o cerebral palsy w/ spastic quadriparesis, SZD currently admitted with nephrolithiasis doubt seizure activity.  Recommendations as above.  Agree with above assessment except as noted.
Pt seen and examined at bedside  CT images visualized by me  plan discussed with Dr Aguilar and Dr Cooper -- plan for RUG to assess if there is any passage in urethra through stricture for possible ureteroscopy with laser litho  given history of stricture possible need for treatment through percutaneous approach    no sepsis at time of initial consultation  plan for more emergent drainage of kidney if sepsis develops -- would plan for PCN    medical clearance needed  care of SPT

## 2020-05-21 NOTE — DIETITIAN INITIAL EVALUATION ADULT. - REASON INDICATOR FOR ASSESSMENT
Found to have G-tube tube feed started on admission, but now made NPO 5/20. Pt is with Cerebral Palsy, Quadriplegia spastic, seizure, and PEG. Appears well nourished at bedside. LBM 5/21. Skin intact. No edema. Unable to locate at room-side for further updates. Tried contacting the number provided [phone] at 10:48am but no one picked up. No Group home/NH note inside chart. No nutrition hx obtained. Last Admission's Ht: 157.4cm, Wt: 54.6kg, BMI: 22.  Today's Bedscale done by me was 59.1kg.

## 2020-05-21 NOTE — DIETITIAN INITIAL EVALUATION ADULT. - ADD RECOMMEND
When medically feasible to start tube feed, please order Jevity 1.2 at 300ml q6hr. This regimen gives a total of 1425 kcal/ 66g protein/ 972mL free water, additional flushes per LIP, or suggesting to have at least 60ml each or more if needed. (2) Monitor electrolytes and replete PRN.

## 2020-05-21 NOTE — DIETITIAN INITIAL EVALUATION ADULT. - ENERGY NEEDS
9967-2731 kcal/day (MSJ x 1.2-1.3)  - this is compared with using cerebral palsy ht (cm) x 11kcal  55-66 g/day (1.0-1.2 g/kg of ABW) - this is compared with using cerebral palsy 15% protein from kcal  1ml/kcal

## 2020-05-21 NOTE — DIETITIAN INITIAL EVALUATION ADULT. - OTHER INFO
presented from group home with abd pain x1 day. found w/ [id number] ureteral stone on R side w/ mod hydronephrosis. s/p R PCT nephrostomy. urosepsis 2/2 pyelonephritis. C/w abx. URI symptoms with COVID contact last 2 weeks. c/w home meds.

## 2020-05-21 NOTE — PROGRESS NOTE ADULT - SUBJECTIVE AND OBJECTIVE BOX
HAWATISH  64y, Male    All available historical data reviewed    OVERNIGHT EVENTS:  no fevers  feels well and has no complaints and wants to go home  s/r R PCN by IVR 5/20    ROS:  limited    VITALS:  T(F): 98.4, Max: 98.7 (05-20-20 @ 17:27)  HR: 91  BP: 118/59  RR: 20Vital Signs Last 24 Hrs  T(C): 36.9 (21 May 2020 14:35), Max: 37.1 (20 May 2020 17:27)  T(F): 98.4 (21 May 2020 14:35), Max: 98.7 (20 May 2020 17:27)  HR: 91 (21 May 2020 14:35) (57 - 105)  BP: 118/59 (21 May 2020 14:35) (90/57 - 118/59)  BP(mean): --  RR: 20 (21 May 2020 14:35) (20 - 29)  SpO2: 100% (20 May 2020 17:59) (100% - 100%)    TESTS & MEASUREMENTS:                        10.6   6.15  )-----------( 141      ( 21 May 2020 05:21 )             31.4     05-21    144  |  106  |  13  ----------------------------<  92  4.1   |  25  |  0.6<L>    Ca    8.2<L>      21 May 2020 05:21  Phos  2.0     05-20  Mg     2.0     05-21    TPro  5.8<L>  /  Alb  2.8<L>  /  TBili  0.3  /  DBili  x   /  AST  30  /  ALT  25  /  AlkPhos  68  05-21    LIVER FUNCTIONS - ( 21 May 2020 05:21 )  Alb: 2.8 g/dL / Pro: 5.8 g/dL / ALK PHOS: 68 U/L / ALT: 25 U/L / AST: 30 U/L / GGT: x             Culture - Blood (collected 05-19-20 @ 11:18)  Source: .Blood None  Gram Stain (05-20-20 @ 19:57):    Growth in anaerobic bottle: Gram Positive Cocci in Clusters  Preliminary Report (05-20-20 @ 19:57):    Growth in anaerobic bottle: Gram Positive Cocci in Clusters    ***Blood Panel PCR results on this specimen are available    approximately 3 hours after the Gram stain result.***    Gram stain, PCR, and/or culture results may not always    correspond due todifference in methodologies.    ************************************************************    This PCR assay was performed using [employer].    The following targets are tested for: Enterococcus,    vancomycin resistant enterococci, Listeria monocytogenes,    coagulase negative staphylococci, S. aureus,    methicillin resistant S. aureus, Streptococcus agalactiae    (Group B), S. pneumoniae, S. pyogenes (Group A),    Acinetobacter baumannii, Enterobacter cloacae, E. coli,    Klebsiella oxytoca, K. pneumoniae, Proteus sp.,    Serratia marcescens, Haemophilus influenzae,    Neisseria meningitidis, Pseudomonas aeruginosa, Candida    albicans, C. glabrata, C krusei, C parapsilosis,    C. tropicalis and the KPC resistance gene.    "Due to technical problems, Proteussp. will Not be reported as part of    the BCID panel until further notice"  Organism: Blood Culture PCR (05-20-20 @ 21:19)  Organism: Blood Culture PCR (05-20-20 @ 21:19)      -  Coagulase negative Staphylococcus: Detec      Method Type: PCR            RADIOLOGY & ADDITIONAL TESTS:  Personal review of radiological diagnostics performed  Echo and EKG results noted when applicable.     MEDICATIONS:  acetaminophen   Tablet .. 650 milliGRAM(s) Oral every 6 hours PRN  ALBUTerol    90 MICROgram(s) HFA Inhaler 2 Puff(s) Inhalation every 6 hours PRN  baclofen 10 milliGRAM(s) Oral three times a day  benzonatate 100 milliGRAM(s) Oral every 8 hours PRN  calcium carbonate   1250 mG (OsCal) 1 Tablet(s) Oral two times a day  dicyclomine 10 milliGRAM(s) Oral daily  enoxaparin Injectable 40 milliGRAM(s) SubCutaneous at bedtime  ketorolac   Injectable 30 milliGRAM(s) IV Push every 6 hours PRN  lactated ringers. 1000 milliLiter(s) IV Continuous <Continuous>  magnesium oxide 400 milliGRAM(s) Oral three times a day with meals  meropenem  IVPB 1000 milliGRAM(s) IV Intermittent every 8 hours  ondansetron Injectable 4 milliGRAM(s) IV Push every 6 hours PRN  PHENobarbital 64.8 milliGRAM(s) Oral daily  polyethylene glycol 3350 17 Gram(s) Oral every 12 hours  polyvinyl alcohol 1.4%/povidone 0.6% Ophthalmic Solution - Peds 1 Drop(s) Both EYES four times a day  senna 2 Tablet(s) Oral at bedtime  tamsulosin 0.4 milliGRAM(s) Oral at bedtime      ANTIBIOTICS:  meropenem  IVPB 1000 milliGRAM(s) IV Intermittent every 8 hours

## 2020-05-21 NOTE — PROGRESS NOTE ADULT - SUBJECTIVE AND OBJECTIVE BOX
Progress Note    Subjective  65 y/o Male admitted with sepsis, right hydronephrosis 2/2 obstructing stones.  Pt is s/p placement of a right nephro-ureteral stent. Pt is doing better more alert today.  Tm 102.2 afebrile since nephrostomy.    [x] a 10 point review of systems was negative except where noted    [  ]  Due to altered mental status/intubation, subjective information was not able t be obtained from the patient.  History was obtained, to the extent possible, from review of the chart and collateral sources of information.    Vital signs  T(C): , Max: 38.7 (05-20-20 @ 12:50)  HR: 93 (05-21-20 @ 06:56)  BP: 117/61 (05-21-20 @ 06:56)  SpO2: 100% (05-20-20 @ 17:59)    Gen NC/AT in NAD  Neck supple, FROM  Abd   No CVAT, soft non tender, bladder not palpable  Nephrostomy draining well clear to slightly tinged urine    SP tube draining clear yellow urine.    Labs                        10.6   6.15  )-----------( 141      ( 21 May 2020 05:21 )             31.4     21 May 2020 05:21    144    |  106    |  13     ----------------------------<  92     4.1     |  25     |  0.6      Ca    8.2        21 May 2020 05:21  Phos  2.0       20 May 2020 04:57  Mg     2.0       21 May 2020 08:17

## 2020-05-21 NOTE — CONSULT NOTE ADULT - SUBJECTIVE AND OBJECTIVE BOX
Date of Admission: 05-19-20    CHIEF COMPLAINT: Patient is a 64y old  Male who presents with a chief complaint of sepsis (21 May 2020 09:00)      HPI:  Patient is a 63 y/o male with PMH of cerebral palsy, seizure disorder, spastic paraplegia, s/p PEG tube, Asthma, H/O nephrolithiasis, BPH with bladder neck stricture s/p suprapubic catheter, and H/O Pseudomonas and ESBL Proteus mirabilis bacteruria who presented from a group with abdominal pain x1 days. Pt states that he started getting a R sided flank pain with radiation to his pelvis after getting his suprapubic cathter tube changes. Pt states that the pain is sharp and constant. Pt also endorses some nausea and vomited x1 in the AM. He also has some pain at the catheter insertion site however catheter has been draining well. Off note pt has a progressive worsening congestion for the last few days. As per aid, pts group home had a resident test postive for COVID. Pt was likely in close contact with resident. Pt also endorses recent subjective fevers. He has had a cough for the last month, pt was recently admitted to the ICU for non-COVID coronavirus bronchitis, never intubated. Denies any other complaints. Denies any chills, changes in weight, chest pain, SOB, diarrhea, myalgias, arthralgias syncope, fatigue.   In the ED, /67  RR 18 SpO2 94 on RA afebrile. CT A/P showed a [id number] proximal right ureteral stone with mod hydronephrosis. Urolgoy was consulted. Pt was given IVF, pain control and Merox1. Swabbed for COVID. (19 May 2020 08:44)      PAST MEDICAL & SURGICAL HISTORY:  Asthma  Urinary retention  Urinary calculi  Spastic quadriplegia  Osteoporosis  Seizure: last seizure &gt;10 years ago  Cerebral palsy  BPH (benign prostatic hyperplasia)  Suprapubic catheter  H/O cystoscopy  S/P percutaneous endoscopic gastrostomy (PEG) tube placement      FAMILY HISTORY:  Family history unknown  [x] no pertinent family history of premature cardiovascular disease in first degree relatives.    SOCIAL HISTORY:    [  ] Non-smoker  [  ] Ex-Smoker  [x] No alcohol use  [x] No illicit drug use    Allergies: No Known Allergies      REVIEW OF SYSTEMS:  CONSTITUTIONAL: No fever, weight loss, or fatigue  CARDIOLOGY: No chest pain, dyspnea of exertion, or syncopal episodes.   RESPIRATORY: (+) Cough. No shortness of breath,wheezing.   NEUROLOGICAL: No weakness, no focal deficits to report.  GI: No BRBPR, no N,V,diarrhea.  (+) R flank pain.  PSYCHIATRY: Normal mood and affect.  HEENT: No nasal discharge, no ecchymosis  SKIN: No ecchymosis, no breakdown  MUSCULOSKELETAL: Full range of motion x4.   EXTREM: No leg swelling or erythema.    PHYSICAL EXAM:  T(C): 36.9 (05-21-20 @ 14:35), Max: 37.1 (05-20-20 @ 17:27)  HR: 91 (05-21-20 @ 14:35) (57 - 105)  BP: 118/59 (05-21-20 @ 14:35) (90/57 - 118/59)  RR: 20 (05-21-20 @ 14:35) (20 - 29)  SpO2: 99% (05-21-20 @ 15:33) (99% - 100%)  Wt(kg): --  I&O's Summary    20 May 2020 07:01  -  21 May 2020 07:00  --------------------------------------------------------  IN: 1450 mL / OUT: 1700 mL / NET: -250 mL    21 May 2020 07:01  -  21 May 2020 15:48  --------------------------------------------------------  IN: 275 mL / OUT: 1950 mL / NET: -1675 mL        General Appearance: NAD, normal for age and gender. 	  Neck: Normal JVP, no bruit.   Eyes: No xanthomalasia, Extra Ocular muscles intact.   Cardiovascular: Regular rate and rhythm S1 S2, No JVD, No murmurs.  Respiratory: Lungs clear to auscultation. No wheezes, rales or rhonchi.  Psychiatry: Alert and oriented x 3, Mood & affect appropriate  Gastrointestinal:  Soft, Non-tender  Skin/Integumen: No rashes, No ecchymoses, No cyanosis	  Neurologic: Non-focal deficits.  Musculoskeletal/ extremities: No clubbing, cyanosis or edema  Vascular: Peripheral pulses palpable 2+ bilaterally    LABS:	 	                        10.6   6.15  )-----------( 141      ( 21 May 2020 05:21 )             31.4     05-21    144  |  106  |  13  ----------------------------<  92  4.1   |  25  |  0.6<L>    Ca    8.2<L>      21 May 2020 05:21  Phos  2.0     05-20  Mg     2.0     05-21    TPro  5.8<L>  /  Alb  2.8<L>  /  TBili  0.3  /  DBili  x   /  AST  30  /  ALT  25  /  AlkPhos  68  05-21        PT/INR - ( 21 May 2020 05:21 )   PT: 14.70 sec;   INR: 1.28 ratio    PTT - ( 21 May 2020 05:21 )  PTT:29.8 sec      TELEMETRY EVENTS: 	    ECG:  < from: 12 Lead ECG (05.20.20 @ 16:06) >  Diagnosis Line Sinus tachycardiawith Fusion complexes  Nonspecific ST and T wave abnormality  Abnormal ECG    RADIOLOGY: < from: Xray Chest 1 View-PORTABLE IMMEDIATE (05.21.20 @ 06:01) >  Worsening left lower lobe opacification.      OTHER: 	    PREVIOUS DIAGNOSTIC TESTING:    [x] Echocardiogram: < from: Transthoracic Echocardiogram (03.23.20 @ 07:46) >   1. Suboptimal study.   2. Left ventricular ejection fraction, by visual estimation, is >55%.   3. Normal global left ventricular systolic function.    PHYSICIAN INTERPRETATION:  Left Ventricle: The left ventricular internal cavity size is normal. Global LV systolic function was normal. Left ventricular ejection fraction, by visual estimation, is >55%.  Right Ventricle: The right ventricular size is normal. RV systolic function is grossly normal.  Left Atrium: Normal left atrial size.  Right Atrium: Normal right atrial size.  Mitral Valve: The mitral valve is grossly normal in structure. No mitral valve regurgitation is seen.  Tricuspid Valve: The tricuspid valve is not well visualized. No tricuspid regurgitation is visualized.  Aortic Valve: The aortic valve was not well visualized. No evidence of aortic stenosis. No aortic regurgitation.  Pulmonic Valve: The pulmonic valve was not well visualized.  Aorta: The ascending aorta was not well visualized.  Pulmonary Artery: The pulmonary artery is not well seen.  Venous: The inferior vena cava is not well visualized.    < end of copied text >    [ ] Catheterization:  [ ] Stress Test:  	  	  Home Medications:  Artificial Tears ophthalmic solution: 1 drop(s) to each affected eye 4 times a day (20 Mar 2020 21:56)  baclofen 10 mg oral tablet: 1 tab(s) by gastrostomy tube 3 times a day (20 Mar 2020 21:56)  docusate sodium 60 mg/15 mL oral syrup: 100 milligram(s) by gastrostomy tube once a day, As Needed for constipation (20 Mar 2020 21:56)  guaifenesin-dextromethorphan 100 mg-10 mg/5 mL oral liquid: 5 milliliter(s) orally every 6 hours, As needed, Cough (27 Mar 2020 11:14)  Oysco 500 (1250 mg calcium carbonate) oral tablet: 1 tab(s) by gastrostomy tube 2 times a day (20 Mar 2020 21:56)  PHENobarbital 64.8 mg oral tablet: 1 tab(s) orally once a day via G tube (20 Mar 2020 21:56)  Ventolin HFA 90 mcg/inh inhalation aerosol: 2 puff(s) inhaled 4 times a day, As Needed (20 Mar 2020 21:56)    MEDICATIONS  (STANDING):  baclofen 10 milliGRAM(s) Oral three times a day  calcium carbonate   1250 mG (OsCal) 1 Tablet(s) Oral two times a day  dicyclomine 10 milliGRAM(s) Oral daily  enoxaparin Injectable 40 milliGRAM(s) SubCutaneous at bedtime  lactated ringers. 1000 milliLiter(s) (75 mL/Hr) IV Continuous <Continuous>  magnesium oxide 400 milliGRAM(s) Oral three times a day with meals  meropenem  IVPB 1000 milliGRAM(s) IV Intermittent every 8 hours  PHENobarbital 64.8 milliGRAM(s) Oral daily  polyethylene glycol 3350 17 Gram(s) Oral every 12 hours  polyvinyl alcohol 1.4%/povidone 0.6% Ophthalmic Solution - Peds 1 Drop(s) Both EYES four times a day  senna 2 Tablet(s) Oral at bedtime  tamsulosin 0.4 milliGRAM(s) Oral at bedtime    MEDICATIONS  (PRN):  acetaminophen   Tablet .. 650 milliGRAM(s) Oral every 6 hours PRN Temp greater or equal to 38C (100.4F)  ALBUTerol    90 MICROgram(s) HFA Inhaler 2 Puff(s) Inhalation every 6 hours PRN Shortness of Breath and/or Wheezing  benzonatate 100 milliGRAM(s) Oral every 8 hours PRN Cough  ketorolac   Injectable 30 milliGRAM(s) IV Push every 6 hours PRN Severe Pain (7 - 10)  ondansetron Injectable 4 milliGRAM(s) IV Push every 6 hours PRN Nausea Date of Admission: 05-19-20    CHIEF COMPLAINT: Patient is a 64y old  Male who presents with a chief complaint of sepsis (21 May 2020 09:00)      HPI:  Patient is a 65 y/o male with PMH of cerebral palsy, seizure disorder, spastic paraplegia, s/p PEG tube, Asthma, H/O nephrolithiasis, BPH with bladder neck stricture s/p suprapubic catheter, and H/O Pseudomonas and ESBL Proteus mirabilis bacteruria who presented from a group with abdominal pain x1 days. Pt states that he started getting a R sided flank pain with radiation to his pelvis after getting his suprapubic cathter tube changes. Pt states that the pain is sharp and constant. Pt also endorses some nausea and vomited x1 in the AM. He also has some pain at the catheter insertion site however catheter has been draining well. Off note pt has a progressive worsening congestion for the last few days. As per aid, pts group home had a resident test postive for COVID. Pt was likely in close contact with resident. Pt also endorses recent subjective fevers. He has had a cough for the last month, pt was recently admitted to the ICU for non-COVID coronavirus bronchitis, never intubated. Denies any other complaints. Denies any chills, changes in weight, chest pain, SOB, diarrhea, myalgias, arthralgias syncope, fatigue.   In the ED, /67  RR 18 SpO2 94 on RA afebrile. CT A/P showed a [id number] proximal right ureteral stone with mod hydronephrosis. Urolgoy was consulted. Pt was given IVF, pain control and Merox1. Swabbed for COVID. (19 May 2020 08:44).      PAST MEDICAL & SURGICAL HISTORY:  Asthma  Urinary retention  Urinary calculi  Spastic quadriplegia  Osteoporosis  Seizure: last seizure &gt;10 years ago  Cerebral palsy  BPH (benign prostatic hyperplasia)  Suprapubic catheter  H/O cystoscopy  S/P percutaneous endoscopic gastrostomy (PEG) tube placement      FAMILY HISTORY:  Family history unknown  [x] no pertinent family history of premature cardiovascular disease in first degree relatives.    SOCIAL HISTORY:    [  ] Non-smoker  [  ] Ex-Smoker  [x] No alcohol use  [x] No illicit drug use    Allergies: No Known Allergies      REVIEW OF SYSTEMS:  CONSTITUTIONAL: No fever, weight loss, or fatigue  CARDIOLOGY: No chest pain, dyspnea of exertion, or syncopal episodes.   RESPIRATORY: (+) Cough. No shortness of breath,wheezing.   NEUROLOGICAL: No weakness, no focal deficits to report.  GI: No BRBPR, no N,V,diarrhea.  (+) R flank pain.  PSYCHIATRY: Normal mood and affect.  HEENT: No nasal discharge, no ecchymosis  SKIN: No ecchymosis, no breakdown  MUSCULOSKELETAL: Full range of motion x4.   EXTREM: No leg swelling or erythema.    PHYSICAL EXAM:  T(C): 36.9 (05-21-20 @ 14:35), Max: 37.1 (05-20-20 @ 17:27)  HR: 91 (05-21-20 @ 14:35) (57 - 105)  BP: 118/59 (05-21-20 @ 14:35) (90/57 - 118/59)  RR: 20 (05-21-20 @ 14:35) (20 - 29)  SpO2: 99% (05-21-20 @ 15:33) (99% - 100%)  Wt(kg): --  I&O's Summary    20 May 2020 07:01  -  21 May 2020 07:00  --------------------------------------------------------  IN: 1450 mL / OUT: 1700 mL / NET: -250 mL    21 May 2020 07:01  -  21 May 2020 15:48  --------------------------------------------------------  IN: 275 mL / OUT: 1950 mL / NET: -1675 mL        General Appearance: NAD, normal for age and gender. 	  Neck: Normal JVP, no bruit.   Eyes: No xanthomalasia, Extra Ocular muscles intact.   Cardiovascular: Regular rate and rhythm S1 S2, No JVD, No murmurs.  Respiratory: Lungs clear to auscultation. No wheezes, rales or rhonchi.  Psychiatry: Alert and oriented x 3, Mood & affect appropriate  Gastrointestinal:  Soft, Non-tender  Skin/Integumen: No rashes, No ecchymoses, No cyanosis	  Neurologic: Non-focal deficits.  Musculoskeletal/ extremities: No clubbing, cyanosis or edema  Vascular: Peripheral pulses palpable 2+ bilaterally    LABS:	 	                        10.6   6.15  )-----------( 141      ( 21 May 2020 05:21 )             31.4     05-21    144  |  106  |  13  ----------------------------<  92  4.1   |  25  |  0.6<L>    Ca    8.2<L>      21 May 2020 05:21  Phos  2.0     05-20  Mg     2.0     05-21    TPro  5.8<L>  /  Alb  2.8<L>  /  TBili  0.3  /  DBili  x   /  AST  30  /  ALT  25  /  AlkPhos  68  05-21        PT/INR - ( 21 May 2020 05:21 )   PT: 14.70 sec;   INR: 1.28 ratio    PTT - ( 21 May 2020 05:21 )  PTT:29.8 sec      TELEMETRY EVENTS: 	    ECG:  < from: 12 Lead ECG (05.20.20 @ 16:06) >  Diagnosis Line Sinus tachycardiawith Fusion complexes  Nonspecific ST and T wave abnormality  Abnormal ECG    RADIOLOGY: < from: Xray Chest 1 View-PORTABLE IMMEDIATE (05.21.20 @ 06:01) >  Worsening left lower lobe opacification.      OTHER: 	    PREVIOUS DIAGNOSTIC TESTING:    [x] Echocardiogram: < from: Transthoracic Echocardiogram (03.23.20 @ 07:46) >   1. Suboptimal study.   2. Left ventricular ejection fraction, by visual estimation, is >55%.   3. Normal global left ventricular systolic function.    PHYSICIAN INTERPRETATION:  Left Ventricle: The left ventricular internal cavity size is normal. Global LV systolic function was normal. Left ventricular ejection fraction, by visual estimation, is >55%.  Right Ventricle: The right ventricular size is normal. RV systolic function is grossly normal.  Left Atrium: Normal left atrial size.  Right Atrium: Normal right atrial size.  Mitral Valve: The mitral valve is grossly normal in structure. No mitral valve regurgitation is seen.  Tricuspid Valve: The tricuspid valve is not well visualized. No tricuspid regurgitation is visualized.  Aortic Valve: The aortic valve was not well visualized. No evidence of aortic stenosis. No aortic regurgitation.  Pulmonic Valve: The pulmonic valve was not well visualized.  Aorta: The ascending aorta was not well visualized.  Pulmonary Artery: The pulmonary artery is not well seen.  Venous: The inferior vena cava is not well visualized.    < end of copied text >    [ ] Catheterization:  [ ] Stress Test:  	  	  Home Medications:  Artificial Tears ophthalmic solution: 1 drop(s) to each affected eye 4 times a day (20 Mar 2020 21:56)  baclofen 10 mg oral tablet: 1 tab(s) by gastrostomy tube 3 times a day (20 Mar 2020 21:56)  docusate sodium 60 mg/15 mL oral syrup: 100 milligram(s) by gastrostomy tube once a day, As Needed for constipation (20 Mar 2020 21:56)  guaifenesin-dextromethorphan 100 mg-10 mg/5 mL oral liquid: 5 milliliter(s) orally every 6 hours, As needed, Cough (27 Mar 2020 11:14)  Oysco 500 (1250 mg calcium carbonate) oral tablet: 1 tab(s) by gastrostomy tube 2 times a day (20 Mar 2020 21:56)  PHENobarbital 64.8 mg oral tablet: 1 tab(s) orally once a day via G tube (20 Mar 2020 21:56)  Ventolin HFA 90 mcg/inh inhalation aerosol: 2 puff(s) inhaled 4 times a day, As Needed (20 Mar 2020 21:56)    MEDICATIONS  (STANDING):  baclofen 10 milliGRAM(s) Oral three times a day  calcium carbonate   1250 mG (OsCal) 1 Tablet(s) Oral two times a day  dicyclomine 10 milliGRAM(s) Oral daily  enoxaparin Injectable 40 milliGRAM(s) SubCutaneous at bedtime  lactated ringers. 1000 milliLiter(s) (75 mL/Hr) IV Continuous <Continuous>  magnesium oxide 400 milliGRAM(s) Oral three times a day with meals  meropenem  IVPB 1000 milliGRAM(s) IV Intermittent every 8 hours  PHENobarbital 64.8 milliGRAM(s) Oral daily  polyethylene glycol 3350 17 Gram(s) Oral every 12 hours  polyvinyl alcohol 1.4%/povidone 0.6% Ophthalmic Solution - Peds 1 Drop(s) Both EYES four times a day  senna 2 Tablet(s) Oral at bedtime  tamsulosin 0.4 milliGRAM(s) Oral at bedtime    MEDICATIONS  (PRN):  acetaminophen   Tablet .. 650 milliGRAM(s) Oral every 6 hours PRN Temp greater or equal to 38C (100.4F)  ALBUTerol    90 MICROgram(s) HFA Inhaler 2 Puff(s) Inhalation every 6 hours PRN Shortness of Breath and/or Wheezing  benzonatate 100 milliGRAM(s) Oral every 8 hours PRN Cough  ketorolac   Injectable 30 milliGRAM(s) IV Push every 6 hours PRN Severe Pain (7 - 10)  ondansetron Injectable 4 milliGRAM(s) IV Push every 6 hours PRN Nausea Date of Admission: 05-19-20    CHIEF COMPLAINT: Patient is a 64y old  Male who presents with a chief complaint of sepsis (21 May 2020 09:00)      HPI:  Patient is a 65 y/o male with PMH of cerebral palsy, seizure disorder, spastic paraplegia, s/p PEG tube, Asthma, H/O nephrolithiasis, BPH with bladder neck stricture s/p suprapubic catheter, and H/O Pseudomonas and ESBL Proteus mirabilis bacteruria who presented from a group with abdominal pain x1 days. Pt states that he started getting a R sided flank pain with radiation to his pelvis after getting his suprapubic cathter tube changes. Pt states that the pain is sharp and constant. Pt also endorses some nausea and vomited x1 in the AM. He also has some pain at the catheter insertion site however catheter has been draining well. Off note pt has a progressive worsening congestion for the last few days. As per aid, pts group home had a resident test postive for COVID. Pt was likely in close contact with resident. Pt also endorses recent subjective fevers. He has had a cough for the last month, pt was recently admitted to the ICU for non-COVID coronavirus bronchitis, never intubated. Denies any other complaints. Denies any chills, changes in weight, chest pain, SOB, diarrhea, myalgias, arthralgias syncope, fatigue.   In the ED, /67  RR 18 SpO2 94 on RA afebrile. CT A/P showed a [id number] proximal right ureteral stone with mod hydronephrosis. Urolgoy was consulted. Pt was given IVF, pain control and Merox1. Swabbed for COVID. (19 May 2020 08:44).      PAST MEDICAL & SURGICAL HISTORY:  Asthma  Urinary retention  Urinary calculi  Spastic quadriplegia  Osteoporosis  Seizure: last seizure &gt;10 years ago  Cerebral palsy  BPH (benign prostatic hyperplasia)  Suprapubic catheter  H/O cystoscopy  S/P percutaneous endoscopic gastrostomy (PEG) tube placement      FAMILY HISTORY:  Family history unknown  [x] no pertinent family history of premature cardiovascular disease in first degree relatives.    SOCIAL HISTORY:    [x] Non-smoker  [x] No alcohol use  [x] No illicit drug use    Allergies: No Known Allergies      REVIEW OF SYSTEMS:  CONSTITUTIONAL: No fever, weight loss, or fatigue  CARDIOLOGY: No chest pain, dyspnea of exertion, or syncopal episodes.   RESPIRATORY: (+) Cough. No shortness of breath,wheezing.   NEUROLOGICAL: No weakness, no focal deficits to report.  GI: No BRBPR, no N,V,diarrhea.  (+) R flank pain.  PSYCHIATRY: Normal mood and affect.  HEENT: No nasal discharge, no ecchymosis  SKIN: No ecchymosis, no breakdown  MUSCULOSKELETAL: Full range of motion x4.   EXTREM: No leg swelling or erythema.    PHYSICAL EXAM:  T(C): 36.9 (05-21-20 @ 14:35), Max: 37.1 (05-20-20 @ 17:27)  HR: 91 (05-21-20 @ 14:35) (57 - 105)  BP: 118/59 (05-21-20 @ 14:35) (90/57 - 118/59)  RR: 20 (05-21-20 @ 14:35) (20 - 29)  SpO2: 99% (05-21-20 @ 15:33) (99% - 100%)  Wt(kg): --  I&O's Summary    20 May 2020 07:01  -  21 May 2020 07:00  --------------------------------------------------------  IN: 1450 mL / OUT: 1700 mL / NET: -250 mL    21 May 2020 07:01  -  21 May 2020 15:48  --------------------------------------------------------  IN: 275 mL / OUT: 1950 mL / NET: -1675 mL        General Appearance: NAD, normal for age and gender. 	  Neck: Normal JVP, no bruit.   Eyes: No xanthomalasia, Extra Ocular muscles intact.   Cardiovascular: Regular rate and rhythm S1 S2, No JVD, No murmurs.  Respiratory: Lungs clear to auscultation. No wheezes, rales or rhonchi.  Psychiatry: Awake oriented to self, Mood & affect appropriate  Gastrointestinal:  Soft, Non-tender, (+) PEG (+) Nephrostomy tube (+) Suprapubic cather.  Skin/Integumen: No rashes, No ecchymoses, No cyanosis	  Neurologic: Non-focal deficits.  Musculoskeletal/ extremities: No clubbing, cyanosis or edema. Atrophy of b/l LE. Contracted b/l UE. Weakness of b/l LE extremities.  Vascular: Peripheral pulses palpable bilaterally    LABS:	 	                        10.6   6.15  )-----------( 141      ( 21 May 2020 05:21 )             31.4     05-21    144  |  106  |  13  ----------------------------<  92  4.1   |  25  |  0.6<L>    Ca    8.2<L>      21 May 2020 05:21  Phos  2.0     05-20  Mg     2.0     05-21    TPro  5.8<L>  /  Alb  2.8<L>  /  TBili  0.3  /  DBili  x   /  AST  30  /  ALT  25  /  AlkPhos  68  05-21        PT/INR - ( 21 May 2020 05:21 )   PT: 14.70 sec;   INR: 1.28 ratio    PTT - ( 21 May 2020 05:21 )  PTT:29.8 sec      TELEMETRY EVENTS: 	    ECG:  < from: 12 Lead ECG (05.20.20 @ 16:06) >  Diagnosis Line Sinus tachycardiawith Fusion complexes  Nonspecific ST and T wave abnormality  Abnormal ECG    RADIOLOGY: < from: Xray Chest 1 View-PORTABLE IMMEDIATE (05.21.20 @ 06:01) >  Worsening left lower lobe opacification.      OTHER: 	    PREVIOUS DIAGNOSTIC TESTING:    [x] Echocardiogram: < from: Transthoracic Echocardiogram (03.23.20 @ 07:46) >   1. Suboptimal study.   2. Left ventricular ejection fraction, by visual estimation, is >55%.   3. Normal global left ventricular systolic function.    PHYSICIAN INTERPRETATION:  Left Ventricle: The left ventricular internal cavity size is normal. Global LV systolic function was normal. Left ventricular ejection fraction, by visual estimation, is >55%.  Right Ventricle: The right ventricular size is normal. RV systolic function is grossly normal.  Left Atrium: Normal left atrial size.  Right Atrium: Normal right atrial size.  Mitral Valve: The mitral valve is grossly normal in structure. No mitral valve regurgitation is seen.  Tricuspid Valve: The tricuspid valve is not well visualized. No tricuspid regurgitation is visualized.  Aortic Valve: The aortic valve was not well visualized. No evidence of aortic stenosis. No aortic regurgitation.  Pulmonic Valve: The pulmonic valve was not well visualized.  Aorta: The ascending aorta was not well visualized.  Pulmonary Artery: The pulmonary artery is not well seen.  Venous: The inferior vena cava is not well visualized.    < end of copied text >    [ ] Catheterization:  [ ] Stress Test:  	  	  Home Medications:  Artificial Tears ophthalmic solution: 1 drop(s) to each affected eye 4 times a day (20 Mar 2020 21:56)  baclofen 10 mg oral tablet: 1 tab(s) by gastrostomy tube 3 times a day (20 Mar 2020 21:56)  docusate sodium 60 mg/15 mL oral syrup: 100 milligram(s) by gastrostomy tube once a day, As Needed for constipation (20 Mar 2020 21:56)  guaifenesin-dextromethorphan 100 mg-10 mg/5 mL oral liquid: 5 milliliter(s) orally every 6 hours, As needed, Cough (27 Mar 2020 11:14)  Oysco 500 (1250 mg calcium carbonate) oral tablet: 1 tab(s) by gastrostomy tube 2 times a day (20 Mar 2020 21:56)  PHENobarbital 64.8 mg oral tablet: 1 tab(s) orally once a day via G tube (20 Mar 2020 21:56)  Ventolin HFA 90 mcg/inh inhalation aerosol: 2 puff(s) inhaled 4 times a day, As Needed (20 Mar 2020 21:56)    MEDICATIONS  (STANDING):  baclofen 10 milliGRAM(s) Oral three times a day  calcium carbonate   1250 mG (OsCal) 1 Tablet(s) Oral two times a day  dicyclomine 10 milliGRAM(s) Oral daily  enoxaparin Injectable 40 milliGRAM(s) SubCutaneous at bedtime  lactated ringers. 1000 milliLiter(s) (75 mL/Hr) IV Continuous <Continuous>  magnesium oxide 400 milliGRAM(s) Oral three times a day with meals  meropenem  IVPB 1000 milliGRAM(s) IV Intermittent every 8 hours  PHENobarbital 64.8 milliGRAM(s) Oral daily  polyethylene glycol 3350 17 Gram(s) Oral every 12 hours  polyvinyl alcohol 1.4%/povidone 0.6% Ophthalmic Solution - Peds 1 Drop(s) Both EYES four times a day  senna 2 Tablet(s) Oral at bedtime  tamsulosin 0.4 milliGRAM(s) Oral at bedtime    MEDICATIONS  (PRN):  acetaminophen   Tablet .. 650 milliGRAM(s) Oral every 6 hours PRN Temp greater or equal to 38C (100.4F)  ALBUTerol    90 MICROgram(s) HFA Inhaler 2 Puff(s) Inhalation every 6 hours PRN Shortness of Breath and/or Wheezing  benzonatate 100 milliGRAM(s) Oral every 8 hours PRN Cough  ketorolac   Injectable 30 milliGRAM(s) IV Push every 6 hours PRN Severe Pain (7 - 10)  ondansetron Injectable 4 milliGRAM(s) IV Push every 6 hours PRN Nausea

## 2020-05-21 NOTE — PROGRESS NOTE ADULT - ASSESSMENT
65 y/o Male admitted with sepsis, right hydronephrosis 2/2 obstructing stones.  Pt is s/p placement of a right nephro-ureteral stent. Pt is doing better more alert today.  Tm 102.2 afebrile since nephrostomy.    A) resolving sepsis  stable s/p right nephro-u stent placement    P) d/w Dr. Cooper  d/w Dr. Mcdaniel and medical team.  Continue IV abx   medical clearance  covid test on sunday  Pt is schedule for intervention on tuesday for PCNL.  will follow

## 2020-05-21 NOTE — PROGRESS NOTE ADULT - ASSESSMENT
Patient is a 65 y/o male with PMH of cerebral palsy, seizure disorder, spastic paraplegia, s/p PEG tube, Asthma, H/O nephrolithiasis, BPH with bladder neck stricture s/p suprapubic catheter, and H/O Pseudomonas and ESBL Proteus mirabilis bacteruria who presented from a group with abdominal pain x1 days.       #urosepsis 2/2 pyelonephritis with hydronephrosis and ureter stones  - s/p R PCT nephrostomy with n   - spiked fever while on abx, consulted IR for nephrostomy drainage   - Pain control with Toradol, monitor renal function   - IVF, Tamsulosin and Bentyl to facilitate passage   - c/w Meropenem  - f/u Urine and BCx,     #acute normocytic anemia: Hbg 10 <13, no signs of blood loss, monitor Hbg and keep active type and screening   # URI symptoms w/ COVID contact in last 2 weeks -COVID PCR negative on 5/19, has lymphopenia looks chronic   #chronic Cough is likely from [place] but non COVID bronchitis   # Seizure disorder - Continue with phenobarbital 64.8mg PO TID  # Cerebral palsy - Continue with baclofen 10mg PO three times a day, c/w PEG feeds   # Asthma - No wheezing on exam, on RA, Albuterol PRN   # BPH with bladder neck stricture s/p suprapubic catheter - Tube replaced day before admission, Chronic colonization w/ ESBL Proteus Mirabilis, grew Pseudomonas on last admission,  #DVT Prophylaxis: Lovenox 40mg SQ once daily, hold today for procedure   #GI Prophylaxis: Not indicated   #Diet: hold feeding for IR procedure  Activity: IAT  Dispo: Group home, [facility] [phone], called with no response   Code Status: Full Code Patient is a 65 y/o male with PMH of cerebral palsy, seizure disorder, spastic paraplegia, s/p PEG tube, Asthma, H/O nephrolithiasis, BPH with bladder neck stricture s/p suprapubic catheter, and H/O Pseudomonas and ESBL Proteus mirabilis bacteruria who presented from a group with abdominal pain x1 days.       #urosepsis 2/2 pyelonephritis with hydronephrosis and ureter stones  - s/p R PCT nephrostomy with stent   - needs Percutaneous nephrolithotomy (PCNL) for stent removal, scheduled on 5/26, needs medical clearance, COVID swap to be done on 5/24 for preop clearance   - Pain control with Toradol PRN, monitor renal function   - IVF, Tamsulosin and Bentyl to facilitate passage   - c/w Meropenem  - f/u Urine and repeat BCx, (1st Cx coag neg staph likely contaminated)     #lactic acidosis: (reolved) possibly seizure after nephrostomy  with subtherapeutic phenobarb level  - f/u repeat level  - lactate level normalized  - do REEG    #acute normocytic anemia: Hbg stable, likely dilutional, no signs of blood loss, monitor Hbg and keep active type and screening     #Abd distention: improved since yestrday, not tender, KUB if normal resume feeding   - bowel regimen     # URI symptoms w/ COVID contact in last 2 weeks -COVID PCR negative on 5/19, has lymphopenia looks chronic     #chronic Cough is likely from [place] but non COVID bronchitis     # Seizure disorder - Continue with phenobarbital 64.8mg PO through PEG tube QD    # Cerebral palsy - Continue with baclofen 10mg PO three times a day, c/w PEG feeds     # Asthma - No wheezing on exam, on RA, Albuterol PRN     # BPH with bladder neck stricture s/p suprapubic catheter - Tube replaced day before admission, Chronic colonization w/ ESBL Proteus Mirabilis, grew Pseudomonas on last admission,    #DVT Prophylaxis: Lovenox 40mg SQ once daily,     #GI Prophylaxis: Not indicated     #Diet: through PEG tube, wait for KUB to resume feeding  Activity: bed rest     Dispo: [facility] home, [facility] [phone], called with no response     Code Status: Full Code Patient is a 65 y/o male with PMH of cerebral palsy, seizure disorder, spastic paraplegia, s/p PEG tube, Asthma, H/O nephrolithiasis, BPH with bladder neck stricture s/p suprapubic catheter, and H/O Pseudomonas and ESBL Proteus mirabilis bacteruria who presented from a group with abdominal pain x1 days.       #urosepsis 2/2 pyelonephritis with hydronephrosis and ureter stones  - s/p R PCT nephrostomy with stent   - needs Percutaneous nephrolithotomy (PCNL) for stent removal, scheduled on 5/26, needs medical clearance, COVID swap to be done on 5/24 for preop clearance   - Pain control with Toradol PRN, monitor renal function   - IVF, Tamsulosin and Bentyl to facilitate passage   - c/w Meropenem  - f/u Urine and repeat BCx, (1st Cx coag neg staph likely contaminated)     #lactic acidosis: (reolved) possibly seizure after nephrostomy  with subtherapeutic phenobarb level  - f/u repeat level  - lactate level normalized  - do REEG    #acute normocytic anemia: Hbg stable, likely dilutional, no signs of blood loss, monitor Hbg and keep active type and screening     #Abd distention: improved since yestrday, not tender, KUB if normal resume feeding   - bowel regimen     # URI symptoms w/ COVID contact in last 2 weeks -COVID PCR negative on 5/19, has lymphopenia looks chronic     #chronic Cough is unlikely from [place] but non COVID bronchitis     # Seizure disorder - Continue with phenobarbital 64.8mg PO through PEG tube QD    # Cerebral palsy - Continue with baclofen 10mg PO three times a day, c/w PEG feeds     # Asthma - No wheezing on exam, on RA, Albuterol PRN     # BPH with bladder neck stricture s/p suprapubic catheter - Tube replaced day before admission, Chronic colonization w/ ESBL Proteus Mirabilis, grew Pseudomonas on last admission,    #DVT Prophylaxis: Lovenox 40mg SQ once daily,     #GI Prophylaxis: Not indicated     #Diet: through PEG tube, wait for KUB to resume feeding  Activity: bed rest     Dispo: Group home, [facility] [phone], called with no response     Code Status: Full Code

## 2020-05-21 NOTE — DIETITIAN INITIAL EVALUATION ADULT. - PHYSICAL APPEARANCE
other (specify)/See top for last admission ht and weight per 3/20/2020. NO ht/Wt documented on this visit. Spastic quadriplegia, CP, PEG

## 2020-05-22 LAB
ALBUMIN SERPL ELPH-MCNC: 2.8 G/DL — LOW (ref 3.5–5.2)
ALP SERPL-CCNC: 71 U/L — SIGNIFICANT CHANGE UP (ref 30–115)
ALT FLD-CCNC: 19 U/L — SIGNIFICANT CHANGE UP (ref 0–41)
ANION GAP SERPL CALC-SCNC: 11 MMOL/L — SIGNIFICANT CHANGE UP (ref 7–14)
APTT BLD: 29.9 SEC — SIGNIFICANT CHANGE UP (ref 27–39.2)
AST SERPL-CCNC: 19 U/L — SIGNIFICANT CHANGE UP (ref 0–41)
BASOPHILS # BLD AUTO: 0.02 K/UL — SIGNIFICANT CHANGE UP (ref 0–0.2)
BASOPHILS NFR BLD AUTO: 0.4 % — SIGNIFICANT CHANGE UP (ref 0–1)
BILIRUB SERPL-MCNC: 0.3 MG/DL — SIGNIFICANT CHANGE UP (ref 0.2–1.2)
BUN SERPL-MCNC: 14 MG/DL — SIGNIFICANT CHANGE UP (ref 10–20)
CALCIUM SERPL-MCNC: 8.2 MG/DL — LOW (ref 8.5–10.1)
CHLORIDE SERPL-SCNC: 101 MMOL/L — SIGNIFICANT CHANGE UP (ref 98–110)
CO2 SERPL-SCNC: 28 MMOL/L — SIGNIFICANT CHANGE UP (ref 17–32)
CREAT SERPL-MCNC: 0.5 MG/DL — LOW (ref 0.7–1.5)
EOSINOPHIL # BLD AUTO: 0.13 K/UL — SIGNIFICANT CHANGE UP (ref 0–0.7)
EOSINOPHIL NFR BLD AUTO: 2.9 % — SIGNIFICANT CHANGE UP (ref 0–8)
GLUCOSE BLDC GLUCOMTR-MCNC: 108 MG/DL — HIGH (ref 70–99)
GLUCOSE SERPL-MCNC: 99 MG/DL — SIGNIFICANT CHANGE UP (ref 70–99)
HCT VFR BLD CALC: 31.2 % — LOW (ref 42–52)
HGB BLD-MCNC: 10.6 G/DL — LOW (ref 14–18)
IMM GRANULOCYTES NFR BLD AUTO: 0.2 % — SIGNIFICANT CHANGE UP (ref 0.1–0.3)
INR BLD: 1.19 RATIO — SIGNIFICANT CHANGE UP (ref 0.65–1.3)
LACTATE SERPL-SCNC: 0.7 MMOL/L — SIGNIFICANT CHANGE UP (ref 0.7–2)
LYMPHOCYTES # BLD AUTO: 1 K/UL — LOW (ref 1.2–3.4)
LYMPHOCYTES # BLD AUTO: 22.3 % — SIGNIFICANT CHANGE UP (ref 20.5–51.1)
MAGNESIUM SERPL-MCNC: 1.7 MG/DL — LOW (ref 1.8–2.4)
MCHC RBC-ENTMCNC: 32.4 PG — HIGH (ref 27–31)
MCHC RBC-ENTMCNC: 34 G/DL — SIGNIFICANT CHANGE UP (ref 32–37)
MCV RBC AUTO: 95.4 FL — HIGH (ref 80–94)
METHOD TYPE: SIGNIFICANT CHANGE UP
MONOCYTES # BLD AUTO: 0.77 K/UL — HIGH (ref 0.1–0.6)
MONOCYTES NFR BLD AUTO: 17.2 % — HIGH (ref 1.7–9.3)
NEUTROPHILS # BLD AUTO: 2.55 K/UL — SIGNIFICANT CHANGE UP (ref 1.4–6.5)
NEUTROPHILS NFR BLD AUTO: 57 % — SIGNIFICANT CHANGE UP (ref 42.2–75.2)
NRBC # BLD: 0 /100 WBCS — SIGNIFICANT CHANGE UP (ref 0–0)
PHENOBARB SERPL-MCNC: 9.3 UG/ML — LOW (ref 15–40)
PLATELET # BLD AUTO: 165 K/UL — SIGNIFICANT CHANGE UP (ref 130–400)
POTASSIUM SERPL-MCNC: 3.4 MMOL/L — LOW (ref 3.5–5)
POTASSIUM SERPL-SCNC: 3.4 MMOL/L — LOW (ref 3.5–5)
PROT SERPL-MCNC: 5.6 G/DL — LOW (ref 6–8)
PROTHROM AB SERPL-ACNC: 13.7 SEC — HIGH (ref 9.95–12.87)
RBC # BLD: 3.27 M/UL — LOW (ref 4.7–6.1)
RBC # FLD: 14 % — SIGNIFICANT CHANGE UP (ref 11.5–14.5)
SODIUM SERPL-SCNC: 140 MMOL/L — SIGNIFICANT CHANGE UP (ref 135–146)
WBC # BLD: 4.48 K/UL — LOW (ref 4.8–10.8)
WBC # FLD AUTO: 4.48 K/UL — LOW (ref 4.8–10.8)

## 2020-05-22 PROCEDURE — 95819 EEG AWAKE AND ASLEEP: CPT | Mod: 26

## 2020-05-22 PROCEDURE — 99233 SBSQ HOSP IP/OBS HIGH 50: CPT

## 2020-05-22 RX ORDER — MAGNESIUM OXIDE 400 MG ORAL TABLET 241.3 MG
400 TABLET ORAL
Refills: 0 | Status: DISCONTINUED | OUTPATIENT
Start: 2020-05-22 | End: 2020-05-26

## 2020-05-22 RX ADMIN — MEROPENEM 100 MILLIGRAM(S): 1 INJECTION INTRAVENOUS at 05:07

## 2020-05-22 RX ADMIN — Medication 1 TABLET(S): at 17:38

## 2020-05-22 RX ADMIN — Medication 10 MILLIGRAM(S): at 14:46

## 2020-05-22 RX ADMIN — POLYETHYLENE GLYCOL 3350 17 GRAM(S): 17 POWDER, FOR SOLUTION ORAL at 05:07

## 2020-05-22 RX ADMIN — ENOXAPARIN SODIUM 40 MILLIGRAM(S): 100 INJECTION SUBCUTANEOUS at 21:46

## 2020-05-22 RX ADMIN — MEROPENEM 100 MILLIGRAM(S): 1 INJECTION INTRAVENOUS at 21:44

## 2020-05-22 RX ADMIN — Medication 1 DROP(S): at 05:12

## 2020-05-22 RX ADMIN — Medication 1 DROP(S): at 17:38

## 2020-05-22 RX ADMIN — Medication 10 MILLIGRAM(S): at 12:08

## 2020-05-22 RX ADMIN — Medication 10 MILLIGRAM(S): at 21:46

## 2020-05-22 RX ADMIN — TAMSULOSIN HYDROCHLORIDE 0.4 MILLIGRAM(S): 0.4 CAPSULE ORAL at 21:46

## 2020-05-22 RX ADMIN — Medication 64.8 MILLIGRAM(S): at 12:40

## 2020-05-22 RX ADMIN — MAGNESIUM OXIDE 400 MG ORAL TABLET 400 MILLIGRAM(S): 241.3 TABLET ORAL at 17:38

## 2020-05-22 RX ADMIN — Medication 1 DROP(S): at 12:10

## 2020-05-22 RX ADMIN — POLYETHYLENE GLYCOL 3350 17 GRAM(S): 17 POWDER, FOR SOLUTION ORAL at 17:38

## 2020-05-22 RX ADMIN — Medication 1 TABLET(S): at 05:07

## 2020-05-22 RX ADMIN — MEROPENEM 100 MILLIGRAM(S): 1 INJECTION INTRAVENOUS at 12:10

## 2020-05-22 RX ADMIN — Medication 10 MILLIGRAM(S): at 05:07

## 2020-05-22 NOTE — PROGRESS NOTE ADULT - SUBJECTIVE AND OBJECTIVE BOX
Overnight/Interval HPI:   Patient is a 64 year old Male a/w sepsis, right hydronephrosis secondary to obstructing R proximal ureteral stone. S/P placement of a right nephro-ureteral stent by IR on 5/20. Pt is doing well. Denies flank, abdominal or back pain. Denies other complaints at this time. Afebrile since nephrostomy placement.     REVIEW OF SYSTEMS:  [ x ] A 10 Point Review of Systems was negative except where noted  [    ] Due to altered mental status/intubation, subjective information was not able to be obtained from the patient. History was obtained to the extent possible from review of the chart and collateral sources of information.     Vital Signs Last 24 Hrs  T(C): 36.7 (22 May 2020 06:49), Max: 36.9 (21 May 2020 14:35)  T(F): 98.1 (22 May 2020 06:49), Max: 98.4 (21 May 2020 14:35)  HR: 87 (22 May 2020 06:49) (87 - 101)  BP: 106/60 (22 May 2020 06:49) (106/60 - 125/67)  RR: 20 (22 May 2020 06:49) (20 - 20)  SpO2: 99% (21 May 2020 15:33) (99% - 99%)    PHYSICAL EXAM:  Gen: NAD, awake/alert  HEENT: NC/AT  Resp: No accessory respiratory muscle use  Abd: +PEG. Soft, NT, bladder non palpable  : +SPT draining clear, yellow urine. +R nephrostomy draining blood tinged urine  Back: R nephrostomy dressing clean/dry/intact  MSK: paraplegic     LABS:             10.6   4.48  )-----------( 165      ( 22 May 2020 06:19 )             31.2     05-21  144  |  106  |  13  ----------------------------<  92  4.1   |  25  |  0.6<L>

## 2020-05-22 NOTE — PROGRESS NOTE ADULT - SUBJECTIVE AND OBJECTIVE BOX
Patient is a 64y old  Male who presents with a chief complaint of Right flank pain (21 May 2020 15:48)      OVERNIGHT EVENTS: report feeling better, no fever, no abd pain     SUBJECTIVE / INTERVAL HPI: Patient seen and examined at bedside.     VITAL SIGNS:  Vital Signs Last 24 Hrs  T(C): 36.7 (22 May 2020 06:49), Max: 36.9 (21 May 2020 14:35)  T(F): 98.1 (22 May 2020 06:49), Max: 98.4 (21 May 2020 14:35)  HR: 87 (22 May 2020 06:49) (87 - 101)  BP: 106/60 (22 May 2020 06:49) (106/60 - 125/67)  BP(mean): --  RR: 20 (22 May 2020 06:49) (20 - 20)  SpO2: 99% (21 May 2020 15:33) (99% - 99%)    PHYSICAL EXAM:    General: WDWN  HEENT: NC/AT; PERRL, clear conjunctiva  Neck: supple  Cardiovascular: +S1/S2; RRR  Respiratory: CTA b/l; no W/R/R  Gastrointestinal: soft, NT/ND; +BSx4, external ureter catheter with pinkish / light red drainage, PEG tube, abd distention resolved   Extremities: WWP; 2+ peripheral pulses; no edema   Neurological: AAOx3; contracted extremities     MEDICATIONS:  MEDICATIONS  (STANDING):  baclofen 10 milliGRAM(s) Oral three times a day  calcium carbonate   1250 mG (OsCal) 1 Tablet(s) Oral two times a day  dicyclomine 10 milliGRAM(s) Oral daily  enoxaparin Injectable 40 milliGRAM(s) SubCutaneous at bedtime  lactated ringers. 1000 milliLiter(s) (75 mL/Hr) IV Continuous <Continuous>  meropenem  IVPB 1000 milliGRAM(s) IV Intermittent every 8 hours  PHENobarbital 64.8 milliGRAM(s) Oral daily  polyethylene glycol 3350 17 Gram(s) Oral every 12 hours  polyvinyl alcohol 1.4%/povidone 0.6% Ophthalmic Solution - Peds 1 Drop(s) Both EYES four times a day  senna 2 Tablet(s) Oral at bedtime  tamsulosin 0.4 milliGRAM(s) Oral at bedtime    MEDICATIONS  (PRN):  acetaminophen   Tablet .. 650 milliGRAM(s) Oral every 6 hours PRN Temp greater or equal to 38C (100.4F)  ALBUTerol    90 MICROgram(s) HFA Inhaler 2 Puff(s) Inhalation every 6 hours PRN Shortness of Breath and/or Wheezing  benzonatate 100 milliGRAM(s) Oral every 8 hours PRN Cough  ketorolac   Injectable 30 milliGRAM(s) IV Push every 6 hours PRN Severe Pain (7 - 10)  ondansetron Injectable 4 milliGRAM(s) IV Push every 6 hours PRN Nausea      ALLERGIES:  Allergies    No Known Allergies    Intolerances        LABS:                        10.6   4.48  )-----------( 165      ( 22 May 2020 06:19 )             31.2     05-21    144  |  106  |  13  ----------------------------<  92  4.1   |  25  |  0.6<L>    Ca    8.2<L>      21 May 2020 05:21  Mg     2.0     05-21    TPro  5.8<L>  /  Alb  2.8<L>  /  TBili  0.3  /  DBili  x   /  AST  30  /  ALT  25  /  AlkPhos  68  05-21    PT/INR - ( 21 May 2020 05:21 )   PT: 14.70 sec;   INR: 1.28 ratio         PTT - ( 21 May 2020 05:21 )  PTT:29.8 sec    CAPILLARY BLOOD GLUCOSE      POCT Blood Glucose.: 108 mg/dL (22 May 2020 06:23)      Culture - Blood (05.20.20 @ 16:20)    -  Coagulase negative Staphylococcus: Nondet    -  Enterococcus species: Nondet    -  Vancomycin resistant Enterococcus sp.: Nondet    -  Escherichia coli: Nondet    -  Klebsiella oxytoca: Nondet    -  Klebsiella pneumoniae: Nondet    -  Serratia marcescens: Nondet    -  Haemophilus influenzae: Nondet    -  Listeria monocytogenes: Nondet    -  Neisseria meningitidis: Nondet    -  Pseudomonas aeruginosa: Nondet    -  Acinetobacter baumanii: Nondet    -  Enterobacter cloacae complex: Nondet    -  Streptococcus sp. (Not Grp A, B or S pneumoniae): Nondet    -  Streptococcus agalactiae (Group B): Nondet    -  Streptococcus pyogenes (Group A): Nondet    -  Streptococcus pneumoniae: Nondet    -  Candida albicans: Nondet    -  Candida glabrata: Nondet    -  Candida krusei: Nondet    -  Candida parapsilosis: Nondet    -  Candida tropicalis: Nondet    -  Multidrug (KPC pos) resistant organism: Nondet    -  Staphylococcus aureus: Nondet    -  Methicillin resistant Staphylococcus aureus (MRSA): Nondet    Gram Stain:   Growth in anaerobic bottle: Gram Negative Rods    Specimen Source: .Blood Blood-Venous    Organism: Blood Culture PCR    Culture Results:   Growth in anaerobic bottle: Gram Negative Rods  "Due to technical problems, Proteus sp. will Not be reported as part of  the BCID panel until further notice"  ***Blood Panel PCR results on this specimen are available  approximately 3 hours after the Gram stain result.***  Gram stain, PCR, and/or culture results may not always  correspond due to difference in methodologies.  ************************************************************  This PCR assay was performed using [employer].  The following targets are tested for: Enterococcus,  vancomycin resistant enterococci, Listeria monocytogenes,  coagulase negative staphylococci, S. aureus,  methicillin resistant S. aureus, Streptococcus agalactiae  (Group B), S. pneumoniae, S. pyogenes (Group A),  Acinetobacter baumannii, Enterobacter cloacae, E. coli,  Klebsiella oxytoca, K. pneumoniae, Proteus sp.,  Serratia marcescens, Haemophilus influenzae,  Neisseria meningitidis, Pseudomonas aeruginosa, Candida  albicans, C. glabrata, C krusei, C parapsilosis,  C. tropicalis and the KPC resistance gene.    Organism Identification: Blood Culture PCR    Method Type: PCR    Culture - Urine (05.19.20 @ 03:25)    -  Trimethoprim/Sulfamethoxazole: R >2/38    -  Amikacin: S <=16    -  Cefoxitin: S <=8    -  Cefepime: R >16    -  Cefazolin: R >16 (MIC_CL_[id number]) For uncomplicated UTI with K. pneumoniae, E. coli, or P. mirablis: MIGUEL A <=16 is sensitive and MIGUEL A >=32 is resistant. This also predicts results for oral agents cefaclor, cefdinir, cefpodoxime, cefprozil, cefuroxime axetil, cephalexin and locarbef for uncomplicated UTI. Note that some isolates may be susceptible to these agents while testing resistant to cefazolin.    -  Aztreonam: R >16    -  Ampicillin: R >16 These ampicillin results predict results for amoxicillin    -  Ampicillin/Sulbactam: R <=8/4 Enterobacter, Citrobacter, and Serratia may develop resistance during prolonged therapy (3-4 days)    -  Levofloxacin: R >4    -  Meropenem: S <=1    -  Gentamicin: S <=4    -  Ertapenem: S <=1    -  Ciprofloxacin: R >2    -  Ceftriaxone: R >32 Enterobacter, Citrobacter, and Serratia may develop resistance during prolonged therapy    -  Tobramycin: S <=4    -  Piperacillin/Tazobactam: R <=16    -  Nitrofurantoin: R >64 Should not be used to treat pyelonephritis    Specimen Source: .Urine Clean Catch (Midstream)    Culture Results:   >100,000 CFU/ml Proteus mirabilis ESBL    Organism Identification: Proteus mirabilis ESBL    Organism: Proteus mirabilis ESBL    Method Type: MIGUEL A Patient is a 64y old  Male who presents with a chief complaint of Right flank pain (21 May 2020 15:48)      OVERNIGHT EVENTS: report feeling better, no fever, no abd pain     SUBJECTIVE / INTERVAL HPI: Patient seen and examined at bedside.     VITAL SIGNS:  Vital Signs Last 24 Hrs  T(C): 36.7 (22 May 2020 06:49), Max: 36.9 (21 May 2020 14:35)  T(F): 98.1 (22 May 2020 06:49), Max: 98.4 (21 May 2020 14:35)  HR: 87 (22 May 2020 06:49) (87 - 101)  BP: 106/60 (22 May 2020 06:49) (106/60 - 125/67)  BP(mean): --  RR: 20 (22 May 2020 06:49) (20 - 20)  SpO2: 99% (21 May 2020 15:33) (99% - 99%)    PHYSICAL EXAM:    General: WDWN  HEENT: NC/AT; PERRL, clear conjunctiva  Neck: supple  Cardiovascular: +S1/S2; RRR  Respiratory: CTA b/l; no W/R/R  Gastrointestinal: soft, NT/ND; +BSx4, external ureter catheter with pinkish / light red drainage, PEG tube, abd distention resolved   Extremities: WWP; 2+ peripheral pulses; no edema   Neurological: AAOx3; contracted extremities, quadriplegia     MEDICATIONS:  MEDICATIONS  (STANDING):  baclofen 10 milliGRAM(s) Oral three times a day  calcium carbonate   1250 mG (OsCal) 1 Tablet(s) Oral two times a day  dicyclomine 10 milliGRAM(s) Oral daily  enoxaparin Injectable 40 milliGRAM(s) SubCutaneous at bedtime  lactated ringers. 1000 milliLiter(s) (75 mL/Hr) IV Continuous <Continuous>  meropenem  IVPB 1000 milliGRAM(s) IV Intermittent every 8 hours  PHENobarbital 64.8 milliGRAM(s) Oral daily  polyethylene glycol 3350 17 Gram(s) Oral every 12 hours  polyvinyl alcohol 1.4%/povidone 0.6% Ophthalmic Solution - Peds 1 Drop(s) Both EYES four times a day  senna 2 Tablet(s) Oral at bedtime  tamsulosin 0.4 milliGRAM(s) Oral at bedtime    MEDICATIONS  (PRN):  acetaminophen   Tablet .. 650 milliGRAM(s) Oral every 6 hours PRN Temp greater or equal to 38C (100.4F)  ALBUTerol    90 MICROgram(s) HFA Inhaler 2 Puff(s) Inhalation every 6 hours PRN Shortness of Breath and/or Wheezing  benzonatate 100 milliGRAM(s) Oral every 8 hours PRN Cough  ketorolac   Injectable 30 milliGRAM(s) IV Push every 6 hours PRN Severe Pain (7 - 10)  ondansetron Injectable 4 milliGRAM(s) IV Push every 6 hours PRN Nausea      ALLERGIES:  Allergies    No Known Allergies    Intolerances        LABS:                        10.6   4.48  )-----------( 165      ( 22 May 2020 06:19 )             31.2     05-21    144  |  106  |  13  ----------------------------<  92  4.1   |  25  |  0.6<L>    Ca    8.2<L>      21 May 2020 05:21  Mg     2.0     05-21    TPro  5.8<L>  /  Alb  2.8<L>  /  TBili  0.3  /  DBili  x   /  AST  30  /  ALT  25  /  AlkPhos  68  05-21    PT/INR - ( 21 May 2020 05:21 )   PT: 14.70 sec;   INR: 1.28 ratio         PTT - ( 21 May 2020 05:21 )  PTT:29.8 sec    CAPILLARY BLOOD GLUCOSE      POCT Blood Glucose.: 108 mg/dL (22 May 2020 06:23)      Culture - Blood (05.20.20 @ 16:20)    -  Coagulase negative Staphylococcus: Nondet    -  Enterococcus species: Nondet    -  Vancomycin resistant Enterococcus sp.: Nondet    -  Escherichia coli: Nondet    -  Klebsiella oxytoca: Nondet    -  Klebsiella pneumoniae: Nondet    -  Serratia marcescens: Nondet    -  Haemophilus influenzae: Nondet    -  Listeria monocytogenes: Nondet    -  Neisseria meningitidis: Nondet    -  Pseudomonas aeruginosa: Nondet    -  Acinetobacter baumanii: Nondet    -  Enterobacter cloacae complex: Nondet    -  Streptococcus sp. (Not Grp A, B or S pneumoniae): Nondet    -  Streptococcus agalactiae (Group B): Nondet    -  Streptococcus pyogenes (Group A): Nondet    -  Streptococcus pneumoniae: Nondet    -  Candida albicans: Nondet    -  Candida glabrata: Nondet    -  Candida krusei: Nondet    -  Candida parapsilosis: Nondet    -  Candida tropicalis: Nondet    -  Multidrug (KPC pos) resistant organism: Nondet    -  Staphylococcus aureus: Nondet    -  Methicillin resistant Staphylococcus aureus (MRSA): Nondet    Gram Stain:   Growth in anaerobic bottle: Gram Negative Rods    Specimen Source: .Blood Blood-Venous    Organism: Blood Culture PCR    Culture Results:   Growth in anaerobic bottle: Gram Negative Rods  "Due to technical problems, Proteus sp. will Not be reported as part of  the BCID panel until further notice"  ***Blood Panel PCR results on this specimen are available  approximately 3 hours after the Gram stain result.***  Gram stain, PCR, and/or culture results may not always  correspond due to difference in methodologies.  ************************************************************  This PCR assay was performed using [employer].  The following targets are tested for: Enterococcus,  vancomycin resistant enterococci, Listeria monocytogenes,  coagulase negative staphylococci, S. aureus,  methicillin resistant S. aureus, Streptococcus agalactiae  (Group B), S. pneumoniae, S. pyogenes (Group A),  Acinetobacter baumannii, Enterobacter cloacae, E. coli,  Klebsiella oxytoca, K. pneumoniae, Proteus sp.,  Serratia marcescens, Haemophilus influenzae,  Neisseria meningitidis, Pseudomonas aeruginosa, Candida  albicans, C. glabrata, C krusei, C parapsilosis,  C. tropicalis and the KPC resistance gene.    Organism Identification: Blood Culture PCR    Method Type: PCR    Culture - Urine (05.19.20 @ 03:25)    -  Trimethoprim/Sulfamethoxazole: R >2/38    -  Amikacin: S <=16    -  Cefoxitin: S <=8    -  Cefepime: R >16    -  Cefazolin: R >16 (MIC_CL_COM_[email or web address]) For uncomplicated UTI with K. pneumoniae, E. coli, or P. mirablis: MIGUEL A <=16 is sensitive and MIGUEL A >=32 is resistant. This also predicts results for oral agents cefaclor, cefdinir, cefpodoxime, cefprozil, cefuroxime axetil, cephalexin and locarbef for uncomplicated UTI. Note that some isolates may be susceptible to these agents while testing resistant to cefazolin.    -  Aztreonam: R >16    -  Ampicillin: R >16 These ampicillin results predict results for amoxicillin    -  Ampicillin/Sulbactam: R <=8/4 Enterobacter, Citrobacter, and Serratia may develop resistance during prolonged therapy (3-4 days)    -  Levofloxacin: R >4    -  Meropenem: S <=1    -  Gentamicin: S <=4    -  Ertapenem: S <=1    -  Ciprofloxacin: R >2    -  Ceftriaxone: R >32 Enterobacter, Citrobacter, and Serratia may develop resistance during prolonged therapy    -  Tobramycin: S <=4    -  Piperacillin/Tazobactam: R <=16    -  Nitrofurantoin: R >64 Should not be used to treat pyelonephritis    Specimen Source: .Urine Clean Catch (Midstream)    Culture Results:   >100,000 CFU/ml Proteus mirabilis ESBL    Organism Identification: Proteus mirabilis ESBL    Organism: Proteus mirabilis ESBL    Method Type: MIGUEL A

## 2020-05-22 NOTE — CDI QUERY NOTE - NSCDIOTHERTXTBX_GEN_ALL_CORE_HH
Documentation:   63 y/o male with PMH of cerebral palsy, spastic paraplegia, s/p PEG tube, H/O nephrolithiasis, s/p suprapubic catheter, presented from a group home with abdominal pain. Found to have sepsis 2/2  pyelonephritis with hydronephrosis and ureter stones  ** Neurology consult 5/20: 64-year-old male with h/o cerebral palsy w/ spastic quadriparesis,  Exam: Motor: Spastic tone of upper and lower extremities             Moves Upper extremities              No mvmt noted in b/l Lower extremities  Nurse flowsheet:   pt is completely dependent in ambulation, transferring, toileting, bathing, dressing and eating.     Based on your clinical evaluation of the patient, can you please specify the type of the cerebral palsy?

## 2020-05-22 NOTE — PROGRESS NOTE ADULT - ASSESSMENT
Patient Patient is a 64 year old Male a/w sepsis, right hydronephrosis secondary to obstructing R proximal ureteral stone, s/p R nephrostomy placement.     Plan  - Scheduled for PCNL on Tuesday with Dr. Cooper  - Medical clearance  - Covid swab on Sunday 5/24  - Continue IV abx per ID  - Will d/w attending

## 2020-05-22 NOTE — PROGRESS NOTE ADULT - ASSESSMENT
Patient is a 63 y/o male with PMH of cerebral palsy, seizure disorder, spastic paraplegia, s/p PEG tube, Asthma, H/O nephrolithiasis, BPH with bladder neck stricture s/p suprapubic catheter, and H/O Pseudomonas and ESBL Proteus mirabilis bacteruria who presented from a group with abdominal pain x1 days.       #urosepsis (resolving) 2/2 pyelonephritis with hydronephrosis and ureter stones with proteus ESBL bacteruria G -ve rods in blood   - s/p R PCT nephrostomy with stent   - needs Percutaneous nephrolithotomy (PCNL) for stent removal, scheduled on 5/26, COVID swap to be done on 5/24 for preop clearance   - Pain control with Toradol PRN, monitor renal function   - IVF, Tamsulosin and Bentyl to facilitate passage   - c/w Meropenem (proteus ESBL bacteruria G -ve rods in blood 5/20)  - f/u repeat Bcx       #lactic acidosis: (reolved) possibly seizure after nephrostomy  with subtherapeutic phenobarb level  - f/u repeat level  - lactate level normalized  - do REEG    #acute normocytic anemia: Hbg stable, likely dilutional, no signs of blood loss, monitor Hbg and keep active type and screening     #Abd distention: resolved, normal KUB  - PEG tube feeding  - bowel regimen     # URI symptoms w/ COVID contact in last 2 weeks -COVID PCR negative on 5/19, has lymphopenia looks chronic     #chronic Cough is unlikely from [place] but non COVID bronchitis     # Seizure disorder - Continue with phenobarbital 64.8mg PO through PEG tube QD    # Cerebral palsy - Continue with baclofen 10mg PO three times a day, c/w PEG feeds     # Asthma - No wheezing on exam, on RA, Albuterol PRN     # BPH with bladder neck stricture s/p suprapubic catheter - Tube replaced day before admission, Chronic colonization w/ ESBL Proteus Mirabilis, grew Pseudomonas on last admission,    #DVT Prophylaxis: Lovenox 40mg SQ once daily,     #GI Prophylaxis: Not indicated     #Diet: through PEG tube feeding    Activity: bed rest     Dispo: Group home, [place] [phone],     Code Status: Full Code Patient is a 65 y/o male with PMH of cerebral palsy, seizure disorder, spastic paraplegia, s/p PEG tube, Asthma, H/O nephrolithiasis, BPH with bladder neck stricture s/p suprapubic catheter, and H/O Pseudomonas and ESBL Proteus mirabilis bacteruria who presented from a group with abdominal pain x1 days.       #urosepsis (resolving) 2/2 pyelonephritis with hydronephrosis and ureter stones with proteus ESBL bacteruria G -ve rods in blood   - s/p R PCT nephrostomy with stent   - needs Percutaneous nephrolithotomy (PCNL) for stent removal, scheduled on 5/26, COVID swap to be done on 5/24 for preop clearance   - Pain control with Toradol PRN, monitor renal function   - IVF, Tamsulosin and Bentyl to facilitate passage   - c/w Meropenem (proteus ESBL bacteruria G -ve rods in blood 5/20)  - f/u repeat Bcx       #lactic acidosis: (reolved) possibly seizure after nephrostomy  with subtherapeutic phenobarb level  - f/u repeat level and REEG, Neuro  - lactate level normalized      #acute normocytic anemia: Hbg stable, likely dilutional, no signs of blood loss, monitor Hbg and keep active type and screening     #Abd distention: resolved, normal KUB  - PEG tube feeding  - bowel regimen     # URI symptoms w/ COVID contact in last 2 weeks -COVID PCR negative on 5/19, has lymphopenia looks chronic     #chronic Cough is unlikely from [place] but non COVID bronchitis     # Seizure disorder - Continue with phenobarbital 64.8mg PO through PEG tube QD    # Cerebral palsy - Continue with baclofen 10mg PO three times a day, c/w PEG feeds     # Asthma - No wheezing on exam, on RA, Albuterol PRN     # BPH with bladder neck stricture s/p suprapubic catheter - Tube replaced day before admission, Chronic colonization w/ ESBL Proteus Mirabilis, grew Pseudomonas on last admission,    #DVT Prophylaxis: Lovenox 40mg SQ once daily,     #GI Prophylaxis: Not indicated     #Diet: through PEG tube feeding    Activity: bed rest     Dispo: [facility], , Leslie ([phone]) is the sister and health care proxy, Mitch is the nurse at [facility] [phone], spoke to both and updated them about pt condition and plan of care     Code Status: Full Code Patient is a 65 y/o male with PMH of cerebral palsy, seizure disorder, spastic paraplegia, s/p PEG tube, Asthma, H/O nephrolithiasis, BPH with bladder neck stricture s/p suprapubic catheter, and H/O Pseudomonas and ESBL Proteus mirabilis bacteruria who presented from a group with abdominal pain x1 days.       #urosepsis (resolving) 2/2 pyelonephritis with hydronephrosis and ureter stones with proteus ESBL bacteruria G -ve rods in blood   - s/p R PCT nephrostomy with stent   - needs Percutaneous nephrolithotomy (PCNL) for stent removal, scheduled on 5/26, COVID swap to be done on 5/24 for preop clearance   - Pain control with Toradol PRN, monitor renal function   - IVF, Tamsulosin and Bentyl to facilitate passage   - c/w Meropenem (proteus ESBL bacteruria G -ve rods in blood 5/20)  - f/u repeat Bcx       #lactic acidosis: (reolved) possibly seizure after nephrostomy  with subtherapeutic phenobarb level  - phenobarb level is low, REEG  no seizure, just slowing, as per Neuro (Dr. Shipley), no dose adjustment needed,   - lactate level normalized      #acute normocytic anemia: Hbg stable, likely dilutional, no signs of blood loss, monitor Hbg and keep active type and screening     #Abd distention: resolved, normal KUB  - PEG tube feeding  - bowel regimen     # URI symptoms w/ COVID contact in last 2 weeks -COVID PCR negative on 5/19, has lymphopenia looks chronic     #chronic Cough is unlikely from [place] but non COVID bronchitis     # Seizure disorder - Continue with phenobarbital 64.8mg PO through PEG tube QD    # Cerebral palsy - Continue with baclofen 10mg PO three times a day, c/w PEG feeds     # Asthma - No wheezing on exam, on RA, Albuterol PRN     # BPH with bladder neck stricture s/p suprapubic catheter - Tube replaced day before admission, Chronic colonization w/ ESBL Proteus Mirabilis, grew Pseudomonas on last admission,    #DVT Prophylaxis: Lovenox 40mg SQ once daily,     #GI Prophylaxis: Not indicated     #Diet: through PEG tube feeding    Activity: bed rest     Dispo: alf, , Leslie ([phone]) is the sister and health care proxy, Mitch is the nurse at [facility] [phone], spoke to both and updated them about pt condition and plan of care     Code Status: Full Code Patient is a 65 y/o male with PMH of cerebral palsy, seizure disorder, spastic paraplegia, s/p PEG tube, Asthma, H/O nephrolithiasis, BPH with bladder neck stricture s/p suprapubic catheter, and H/O Pseudomonas and ESBL Proteus mirabilis bacteruria who presented from a group with abdominal pain x1 days.       #urosepsis (resolving) 2/2 pyelonephritis with hydronephrosis and ureter stones with proteus ESBL bacteruria G -ve rods in blood   - s/p R PCT nephrostomy with stent   - needs Percutaneous nephrolithotomy (PCNL) for stent removal, scheduled on 5/26, COVID swap to be done on 5/24 for preop clearance   - Pain control with Toradol PRN, monitor renal function   - IVF, Tamsulosin and Bentyl to facilitate passage   - c/w Meropenem (proteus ESBL bacteruria G -ve rods in blood 5/20)  - f/u repeat Bcx       #lactic acidosis: (reolved) possibly seizure after nephrostomy  with subtherapeutic phenobarb level  - phenobarb level is low, REEG  no seizure, just slowing, as per Neuro (Dr. Shipley), no dose adjustment needed,   - lactate level normalized      #acute normocytic anemia: Hbg stable, likely dilutional, no signs of blood loss, monitor Hbg and keep active type and screening     #Abd distention: resolved, normal KUB  - PEG tube feeding  - bowel regimen     # URI symptoms w/ COVID contact in last 2 weeks -COVID PCR negative on 5/19, has lymphopenia looks chronic     # chronic Cough is unlikely from [place] but non COVID bronchitis     # Seizure disorder - Continue with phenobarbital 64.8mg PO through PEG tube QD    # Cerebral palsy w/ Spastic Paraplegia - Continue with baclofen 10mg PO three times a day, c/w PEG feeds     # Functional quadriplegia - full care    # Asthma - No wheezing on exam, on RA, Albuterol PRN     # BPH with bladder neck stricture s/p suprapubic catheter - Tube replaced day before admission, Chronic colonization w/ ESBL Proteus Mirabilis, grew Pseudomonas on last admission,    #DVT Prophylaxis: Lovenox 40mg SQ once daily,     #GI Prophylaxis: Not indicated     #Diet: through PEG tube feeding    Activity: bed rest     Dispo: detention, , Leslie ([phone]) is the sister and health care proxy, Mitch is the nurse at [facility] [phone], spoke to both and updated them about pt condition and plan of care     Code Status: Full Code Patient is a 65 y/o male with PMH of cerebral palsy, seizure disorder, spastic paraplegia, s/p PEG tube, Asthma, H/O nephrolithiasis, BPH with bladder neck stricture s/p suprapubic catheter, and H/O Pseudomonas and ESBL Proteus mirabilis bacteruria who presented from a group with abdominal pain x1 days.       #urosepsis (resolving) 2/2 pyelonephritis with hydronephrosis and ureter stones with proteus ESBL bacteruria G -ve rods in blood   - s/p R PCT nephrostomy with stent   - needs Percutaneous nephrolithotomy (PCNL) for stent removal, scheduled on 5/26, COVID swap to be done on 5/24 for preop clearance   - Pain control with Toradol PRN, monitor renal function   - IVF, Tamsulosin and Bentyl to facilitate passage   - c/w Meropenem (proteus ESBL bacteruria G -ve rods in blood 5/20)  - f/u repeat Bcx       #lactic acidosis: (reolved) possibly seizure after nephrostomy  with subtherapeutic phenobarb level  - phenobarb level is low, REEG  no seizure, just slowing, as per Neuro (Dr. Shipley), no dose adjustment needed,   - lactate level normalized      #acute normocytic anemia: Hbg stable, likely dilutional, no signs of blood loss, monitor Hbg and keep active type and screening     #Abd distention: resolved, normal KUB  - PEG tube feeding  - bowel regimen     # URI symptoms w/ COVID contact in last 2 weeks -COVID PCR negative on 5/19, has lymphopenia looks chronic     # chronic Cough is unlikely from [place] but non COVID bronchitis     # Seizure disorder - Continue with phenobarbital 64.8mg PO through PEG tube QD    # Cerebral palsy w/ Spastic quadriplegia - Continue with baclofen 10mg PO three times a day, c/w PEG feeds     # Functional quadriplegia - full care    # Asthma - No wheezing on exam, on RA, Albuterol PRN     # BPH with bladder neck stricture s/p suprapubic catheter - Tube replaced day before admission, Chronic colonization w/ ESBL Proteus Mirabilis, grew Pseudomonas on last admission,    #DVT Prophylaxis: Lovenox 40mg SQ once daily,     #GI Prophylaxis: Not indicated     #Diet: through PEG tube feeding    Activity: bed rest     Dispo: care home, , Leslie ([phone]) is the sister and health care proxy, Mitch is the nurse at [facility] [phone], spoke to both and updated them about pt condition and plan of care     Code Status: Full Code

## 2020-05-22 NOTE — PROGRESS NOTE ADULT - SUBJECTIVE AND OBJECTIVE BOX
HAWATISH WAYNE  64y, Male    All available historical data reviewed    OVERNIGHT EVENTS:  no fevers  clinically no change    ROS:  unable to obtain history secondary to patient's mental status and/or sedation     VITALS:  T(F): 98.1, Max: 98.4 (05-21-20 @ 14:35)  HR: 87  BP: 106/60  RR: 20Vital Signs Last 24 Hrs  T(C): 36.7 (22 May 2020 06:49), Max: 36.9 (21 May 2020 14:35)  T(F): 98.1 (22 May 2020 06:49), Max: 98.4 (21 May 2020 14:35)  HR: 87 (22 May 2020 06:49) (87 - 101)  BP: 106/60 (22 May 2020 06:49) (106/60 - 125/67)  BP(mean): --  RR: 20 (22 May 2020 06:49) (20 - 20)  SpO2: 99% (21 May 2020 15:33) (99% - 99%)    TESTS & MEASUREMENTS:                        10.6   4.48  )-----------( 165      ( 22 May 2020 06:19 )             31.2     05-22    140  |  101  |  14  ----------------------------<  99  3.4<L>   |  28  |  0.5<L>    Ca    8.2<L>      22 May 2020 06:19  Mg     1.7     05-22    TPro  5.6<L>  /  Alb  2.8<L>  /  TBili  0.3  /  DBili  x   /  AST  19  /  ALT  19  /  AlkPhos  71  05-22    LIVER FUNCTIONS - ( 22 May 2020 06:19 )  Alb: 2.8 g/dL / Pro: 5.6 g/dL / ALK PHOS: 71 U/L / ALT: 19 U/L / AST: 19 U/L / GGT: x             Culture - Blood (collected 05-20-20 @ 16:20)  Source: .Blood Blood-Venous  Gram Stain (05-21-20 @ 17:00):    Growth in anaerobic bottle: Gram Negative Rods  Preliminary Report (05-21-20 @ 17:00):    Growth in anaerobic bottle: Gram Negative Rods    "Due to technical problems, Proteus sp. will Not be reported as part of    the BCID panel until further notice"    ***Blood Panel PCR results on this specimen are available    approximately 3 hours after the Gram stain result.***    Gram stain, PCR, and/or culture results may not always    correspond due to difference in methodologies.    ************************************************************    This PCR assay was performed using [employer].    The following targets are tested for: Enterococcus,    vancomycin resistant enterococci, Listeria monocytogenes,    coagulase negative staphylococci, S. aureus,    methicillin resistant S. aureus, Streptococcus agalactiae    (Group B), S. pneumoniae, S. pyogenes (Group A),    Acinetobacter baumannii, Enterobacter cloacae, E. coli,    Klebsiella oxytoca, K. pneumoniae, Proteus sp.,    Serratia marcescens, Haemophilus influenzae,    Neisseria meningitidis, Pseudomonas aeruginosa, Candida    albicans, C. glabrata, C krusei, C parapsilosis,    C. tropicalis and the KPC resistance gene.  Organism: Blood Culture PCR (05-21-20 @ 18:32)  Organism: Blood Culture PCR (05-21-20 @ 18:32)      -  Acinetobacter baumanii: Nondet      -  Candida albicans: Nondet      -  Candida glabrata: Nondet      -  Candida krusei: Nondet      -  Candida parapsilosis: Nondet      -  Candida tropicalis: Nondet      -  Coagulase negative Staphylococcus: Nondet      -  Enterobacter cloacae complex: Nondet      -  Enterococcus species: Nondet      -  Escherichia coli: Nondet      -  Haemophilus influenzae: Nondet      -  Klebsiella oxytoca: Nondet      -  Klebsiella pneumoniae: Nondet      -  Listeria monocytogenes: Nondet      -  Methicillin resistant Staphylococcus aureus (MRSA): Nondet      -  Multidrug (KPC pos) resistant organism: Nondet      -  Neisseria meningitidis: Nondet      -  Pseudomonas aeruginosa: Nondet      -  Serratia marcescens: Nondet      -  Staphylococcus aureus: Nondet      -  Streptococcus agalactiae (Group B): Nondet      -  Streptococcus pneumoniae: Nondet      -  Streptococcus pyogenes (Group A): Nondet      -  Streptococcus sp. (Not Grp A, B or S pneumoniae): Nondet      -  Vancomycin resistant Enterococcus sp.: Nondet      Method Type: PCR    Culture - Urine (collected 05-20-20 @ 13:55)  Source: .Urine None  Preliminary Report (05-21-20 @ 22:52):    Moderate Gram Negative Rods    Culture - Blood (collected 05-19-20 @ 11:18)  Source: .Blood None  Gram Stain (05-20-20 @ 19:57):    Growth in anaerobic bottle: Gram Positive Cocci in Clusters  Final Report (05-21-20 @ 18:19):    Growth in anaerobic bottle: Staphylococcus epidermidis    Coag Negative Staphylococcus    Single set isolate, possible contaminant. Contact    Microbiology if susceptibility testing clinically    indicated.    ***Blood Panel PCR results on this specimen are available    approximately 3 hours after the Gram stain result.***    Gram stain, PCR, and/or culture results may not always    correspond due to difference in methodologies.    ************************************************************    This PCR assay was performed using [employer].    The following targets are tested for: Enterococcus,    vancomycin resistant enterococci, Listeria monocytogenes,    coagulase negative staphylococci, S. aureus,    methicillin resistant S. aureus, Streptococcus agalactiae    (Group B), S. pneumoniae, S. pyogenes (Group A),    Acinetobacter baumannii, Enterobacter cloacae, E. coli,    Klebsiella oxytoca, K. pneumoniae, Proteus sp.,    Serratia marcescens, Haemophilus influenzae,    Neisseria meningitidis, Pseudomonas aeruginosa,Candida    albicans, C. glabrata, C krusei, C parapsilosis,    C. tropicalis and the KPC resistance gene.    "Due to technical problems, Proteus sp. will Not be reported as part of    the BCID panel until further notice"  Organism: Blood Culture PCR  Staphylococcus epidermidis (05-21-20 @ 18:19)  Organism: Staphylococcus epidermidis (05-21-20 @ 18:19)      Method Type: MIGUEL A  Organism: Blood Culture PCR (05-21-20 @ 18:19)      -  Coagulase negative Staphylococcus: Detec      Method Type: PCR    Culture - Urine (collected 05-19-20 @ 03:25)  Source: .Urine Clean Catch (Midstream)  Final Report (05-21-20 @ 17:31):    >100,000 CFU/ml Proteus mirabilis ESBL  Organism: Proteus mirabilis ESBL (05-21-20 @ 17:31)  Organism: Proteus mirabilis ESBL (05-21-20 @ 17:31)      -  Amikacin: S <=16      -  Ampicillin: R >16 These ampicillin results predict results for amoxicillin      -  Ampicillin/Sulbactam: R <=8/4 Enterobacter, Citrobacter, and Serratia may develop resistance during prolonged therapy (3-4 days)      -  Aztreonam: R >16      -  Cefazolin: R >16 ([id number]) For uncomplicated UTI with K. pneumoniae, E. coli, or P. mirablis: MIGUEL A <=16 is sensitive and MIGUEL A >=32 is resistant. This also predicts results for oral agents cefaclor, cefdinir, cefpodoxime, cefprozil, cefuroxime axetil, cephalexin and locarbef for uncomplicated UTI. Note that some isolates may be susceptible to these agents while testing resistant to cefazolin.      -  Cefepime: R >16      -  Cefoxitin: S <=8      -  Ceftriaxone: R >32 Enterobacter, Citrobacter, and Serratia may develop resistance during prolonged therapy      -  Ciprofloxacin: R >2      -  Ertapenem: S <=1      -  Gentamicin: S <=4      -  Levofloxacin: R >4      -  Meropenem: S <=1      -  Nitrofurantoin: R >64 Should not be used to treat pyelonephritis      -  Piperacillin/Tazobactam: R <=16      -  Tobramycin: S <=4      -  Trimethoprim/Sulfamethoxazole: R >2/38      Method Type: MIGUEL A            RADIOLOGY & ADDITIONAL TESTS:  Personal review of radiological diagnostics performed  Echo and EKG results noted when applicable.     MEDICATIONS:  acetaminophen   Tablet .. 650 milliGRAM(s) Oral every 6 hours PRN  ALBUTerol    90 MICROgram(s) HFA Inhaler 2 Puff(s) Inhalation every 6 hours PRN  baclofen 10 milliGRAM(s) Oral three times a day  benzonatate 100 milliGRAM(s) Oral every 8 hours PRN  calcium carbonate   1250 mG (OsCal) 1 Tablet(s) Oral two times a day  dicyclomine 10 milliGRAM(s) Oral daily  enoxaparin Injectable 40 milliGRAM(s) SubCutaneous at bedtime  ketorolac   Injectable 30 milliGRAM(s) IV Push every 6 hours PRN  lactated ringers. 1000 milliLiter(s) IV Continuous <Continuous>  meropenem  IVPB 1000 milliGRAM(s) IV Intermittent every 8 hours  ondansetron Injectable 4 milliGRAM(s) IV Push every 6 hours PRN  PHENobarbital 64.8 milliGRAM(s) Oral daily  polyethylene glycol 3350 17 Gram(s) Oral every 12 hours  polyvinyl alcohol 1.4%/povidone 0.6% Ophthalmic Solution - Peds 1 Drop(s) Both EYES four times a day  senna 2 Tablet(s) Oral at bedtime  tamsulosin 0.4 milliGRAM(s) Oral at bedtime      ANTIBIOTICS:  meropenem  IVPB 1000 milliGRAM(s) IV Intermittent every 8 hours

## 2020-05-22 NOTE — PROGRESS NOTE ADULT - ASSESSMENT
· Assessment		  65 y/o male with PMH of cerebral palsy, seizure disorder, spastic paraplegia, s/p PEG tube, Asthma, H/O nephrolithiasis, BPH with bladder neck stricture s/p suprapubic catheter, and H/O Pseudomonas and ESBL Proteus mirabilis bacteruria who presented from a group with abdominal pain x1 days found to have R sided nephrolithiasis with hydronephrosis. Pt also has recent URI symptoms r/o COVID.     IMPRESSION;   Resolved sepsis secondary to acute Right pyelonephritis with right hydronephrosis with right ureteral stone  S/p R PCN by IVR 5/20  BCx 5/19 CoNS ( not a true pathogen)  BCx 5/20 GNRs  UVx 5/19 P mirabilis ESBL  CT A/P - right hydronephrosis with a 9 x 7 x 10 mm proximal right ureteral calculus and ureteral enhancement concerning for infection   COVID-19     RECOMMENDATIONS;  f/u BCx  planned for possible L PCNL on 5/25  Meropenem 1 gm iv q8h

## 2020-05-23 LAB
-  AMIKACIN: SIGNIFICANT CHANGE UP
-  AMPICILLIN/SULBACTAM: SIGNIFICANT CHANGE UP
-  AMPICILLIN: SIGNIFICANT CHANGE UP
-  AZTREONAM: SIGNIFICANT CHANGE UP
-  CEFAZOLIN: SIGNIFICANT CHANGE UP
-  CEFEPIME: SIGNIFICANT CHANGE UP
-  CEFOXITIN: SIGNIFICANT CHANGE UP
-  CEFTRIAXONE: SIGNIFICANT CHANGE UP
-  CIPROFLOXACIN: SIGNIFICANT CHANGE UP
-  ERTAPENEM: SIGNIFICANT CHANGE UP
-  GENTAMICIN: SIGNIFICANT CHANGE UP
-  LEVOFLOXACIN: SIGNIFICANT CHANGE UP
-  MEROPENEM: SIGNIFICANT CHANGE UP
-  PIPERACILLIN/TAZOBACTAM: SIGNIFICANT CHANGE UP
-  TOBRAMYCIN: SIGNIFICANT CHANGE UP
-  TRIMETHOPRIM/SULFAMETHOXAZOLE: SIGNIFICANT CHANGE UP
ALBUMIN SERPL ELPH-MCNC: 2.9 G/DL — LOW (ref 3.5–5.2)
ALP SERPL-CCNC: 72 U/L — SIGNIFICANT CHANGE UP (ref 30–115)
ALT FLD-CCNC: 18 U/L — SIGNIFICANT CHANGE UP (ref 0–41)
ANION GAP SERPL CALC-SCNC: 13 MMOL/L — SIGNIFICANT CHANGE UP (ref 7–14)
AST SERPL-CCNC: 22 U/L — SIGNIFICANT CHANGE UP (ref 0–41)
BASOPHILS # BLD AUTO: 0.01 K/UL — SIGNIFICANT CHANGE UP (ref 0–0.2)
BASOPHILS NFR BLD AUTO: 0.2 % — SIGNIFICANT CHANGE UP (ref 0–1)
BILIRUB SERPL-MCNC: 0.2 MG/DL — SIGNIFICANT CHANGE UP (ref 0.2–1.2)
BUN SERPL-MCNC: 14 MG/DL — SIGNIFICANT CHANGE UP (ref 10–20)
CALCIUM SERPL-MCNC: 8 MG/DL — LOW (ref 8.5–10.1)
CHLORIDE SERPL-SCNC: 101 MMOL/L — SIGNIFICANT CHANGE UP (ref 98–110)
CO2 SERPL-SCNC: 26 MMOL/L — SIGNIFICANT CHANGE UP (ref 17–32)
CREAT SERPL-MCNC: 0.5 MG/DL — LOW (ref 0.7–1.5)
EOSINOPHIL # BLD AUTO: 0.23 K/UL — SIGNIFICANT CHANGE UP (ref 0–0.7)
EOSINOPHIL NFR BLD AUTO: 5.6 % — SIGNIFICANT CHANGE UP (ref 0–8)
GLUCOSE SERPL-MCNC: 104 MG/DL — HIGH (ref 70–99)
GRAM STN FLD: SIGNIFICANT CHANGE UP
HCT VFR BLD CALC: 33.7 % — LOW (ref 42–52)
HGB BLD-MCNC: 11.4 G/DL — LOW (ref 14–18)
IMM GRANULOCYTES NFR BLD AUTO: 0.2 % — SIGNIFICANT CHANGE UP (ref 0.1–0.3)
LYMPHOCYTES # BLD AUTO: 1.12 K/UL — LOW (ref 1.2–3.4)
LYMPHOCYTES # BLD AUTO: 27.2 % — SIGNIFICANT CHANGE UP (ref 20.5–51.1)
MCHC RBC-ENTMCNC: 31.8 PG — HIGH (ref 27–31)
MCHC RBC-ENTMCNC: 33.8 G/DL — SIGNIFICANT CHANGE UP (ref 32–37)
MCV RBC AUTO: 93.9 FL — SIGNIFICANT CHANGE UP (ref 80–94)
MONOCYTES # BLD AUTO: 0.59 K/UL — SIGNIFICANT CHANGE UP (ref 0.1–0.6)
MONOCYTES NFR BLD AUTO: 14.3 % — HIGH (ref 1.7–9.3)
NEUTROPHILS # BLD AUTO: 2.16 K/UL — SIGNIFICANT CHANGE UP (ref 1.4–6.5)
NEUTROPHILS NFR BLD AUTO: 52.5 % — SIGNIFICANT CHANGE UP (ref 42.2–75.2)
NRBC # BLD: 0 /100 WBCS — SIGNIFICANT CHANGE UP (ref 0–0)
PLATELET # BLD AUTO: 170 K/UL — SIGNIFICANT CHANGE UP (ref 130–400)
POTASSIUM SERPL-MCNC: 3.7 MMOL/L — SIGNIFICANT CHANGE UP (ref 3.5–5)
POTASSIUM SERPL-SCNC: 3.7 MMOL/L — SIGNIFICANT CHANGE UP (ref 3.5–5)
PROT SERPL-MCNC: 6 G/DL — SIGNIFICANT CHANGE UP (ref 6–8)
RBC # BLD: 3.59 M/UL — LOW (ref 4.7–6.1)
RBC # FLD: 13.9 % — SIGNIFICANT CHANGE UP (ref 11.5–14.5)
SODIUM SERPL-SCNC: 140 MMOL/L — SIGNIFICANT CHANGE UP (ref 135–146)
WBC # BLD: 4.12 K/UL — LOW (ref 4.8–10.8)
WBC # FLD AUTO: 4.12 K/UL — LOW (ref 4.8–10.8)

## 2020-05-23 PROCEDURE — 99233 SBSQ HOSP IP/OBS HIGH 50: CPT

## 2020-05-23 PROCEDURE — 71045 X-RAY EXAM CHEST 1 VIEW: CPT | Mod: 26

## 2020-05-23 RX ADMIN — Medication 64.8 MILLIGRAM(S): at 13:45

## 2020-05-23 RX ADMIN — MEROPENEM 100 MILLIGRAM(S): 1 INJECTION INTRAVENOUS at 13:45

## 2020-05-23 RX ADMIN — Medication 10 MILLIGRAM(S): at 05:16

## 2020-05-23 RX ADMIN — Medication 1 TABLET(S): at 17:23

## 2020-05-23 RX ADMIN — MAGNESIUM OXIDE 400 MG ORAL TABLET 400 MILLIGRAM(S): 241.3 TABLET ORAL at 05:16

## 2020-05-23 RX ADMIN — SENNA PLUS 2 TABLET(S): 8.6 TABLET ORAL at 21:13

## 2020-05-23 RX ADMIN — Medication 1 DROP(S): at 00:54

## 2020-05-23 RX ADMIN — Medication 1 TABLET(S): at 05:16

## 2020-05-23 RX ADMIN — Medication 10 MILLIGRAM(S): at 13:45

## 2020-05-23 RX ADMIN — MEROPENEM 100 MILLIGRAM(S): 1 INJECTION INTRAVENOUS at 05:10

## 2020-05-23 RX ADMIN — ENOXAPARIN SODIUM 40 MILLIGRAM(S): 100 INJECTION SUBCUTANEOUS at 21:13

## 2020-05-23 RX ADMIN — Medication 1 DROP(S): at 05:16

## 2020-05-23 RX ADMIN — Medication 10 MILLIGRAM(S): at 21:13

## 2020-05-23 RX ADMIN — MAGNESIUM OXIDE 400 MG ORAL TABLET 400 MILLIGRAM(S): 241.3 TABLET ORAL at 21:13

## 2020-05-23 RX ADMIN — Medication 1 DROP(S): at 11:51

## 2020-05-23 RX ADMIN — SODIUM CHLORIDE 75 MILLILITER(S): 9 INJECTION, SOLUTION INTRAVENOUS at 05:10

## 2020-05-23 RX ADMIN — MAGNESIUM OXIDE 400 MG ORAL TABLET 400 MILLIGRAM(S): 241.3 TABLET ORAL at 13:45

## 2020-05-23 RX ADMIN — Medication 1 DROP(S): at 17:24

## 2020-05-23 RX ADMIN — MEROPENEM 100 MILLIGRAM(S): 1 INJECTION INTRAVENOUS at 21:18

## 2020-05-23 RX ADMIN — Medication 10 MILLIGRAM(S): at 11:50

## 2020-05-23 NOTE — PROGRESS NOTE ADULT - ASSESSMENT
Patient is a 65 y/o male with PMH of cerebral palsy, seizure disorder, spastic paraplegia, s/p PEG tube, Asthma, H/O nephrolithiasis, BPH with bladder neck stricture s/p suprapubic catheter, and H/O Pseudomonas and ESBL Proteus mirabilis bacteruria who presented from a group with abdominal pain x1 days.       #urosepsis (resolving) 2/2 pyelonephritis with hydronephrosis and ureter stones with proteus ESBL bacteruria and bacteremia   - s/p R PCT nephrostomy with stent on 5/20  - needs Percutaneous nephrolithotomy (PCNL) for stent removal, scheduled on 5/26, COVID swap to be done on 5/24 for preop clearance   - Pain control with Toradol PRN, monitor renal function   - IVF, Tamsulosin and Bentyl to facilitate passage   - c/w Meropenem (proteus ESBL bacteruria and bacteremia)  - f/u repeat Bcx       #lactic acidosis: (reolved) possibly seizure after nephrostomy  with subtherapeutic phenobarb level  - phenobarb level is low, REEG  no seizure, just slowing, as per Neuro (spoke with Dr. Shipley), no dose adjustment needed,   - lactate level normalized      #acute normocytic anemia: Hbg stable, likely dilutional, no signs of blood loss, monitor Hbg and keep active type and screening     #Abd distention: resolved, normal KUB  - PEG tube feeding  - bowel regimen     # URI symptoms w/ COVID contact in last 2 weeks -COVID PCR negative on 5/19, has lymphopenia looks chronic     # chronic Cough is unlikely from [place] but non COVID bronchitis     # Seizure disorder - Continue with phenobarbital 64.8mg PO through PEG tube QD    # Cerebral palsy w/ Spastic quadriplegia - Continue with baclofen 10mg PO three times a day, c/w PEG feeds     # Functional quadriplegia - full care    # Asthma - No wheezing on exam, on RA, Albuterol PRN     # BPH with bladder neck stricture s/p suprapubic catheter - Tube replaced day before admission, Chronic colonization w/ ESBL Proteus Mirabilis, grew Pseudomonas on last admission,    #DVT Prophylaxis: Lovenox 40mg SQ once daily,     #GI Prophylaxis: Not indicated     #Diet: through PEG tube feeding    Activity: bed rest     Dispo: [facility], , Leslie ([phone]) is the sister and health care proxy, Mitch is the nurse at [facility] [phone],   Code Status: Full Code

## 2020-05-23 NOTE — PROGRESS NOTE ADULT - SUBJECTIVE AND OBJECTIVE BOX
Patient is a 64y old  Male who presents with a chief complaint of abd pain and fever (22 May 2020 07:54)      OVERNIGHT EVENTS: remained stable, on RA, no SOB, abd pain or fever    SUBJECTIVE / INTERVAL HPI: Patient seen and examined at bedside.     VITAL SIGNS:  Vital Signs Last 24 Hrs  T(C): 37.2 (23 May 2020 06:55), Max: 37.2 (23 May 2020 06:55)  T(F): 98.9 (23 May 2020 06:55), Max: 98.9 (23 May 2020 06:55)  HR: 73 (23 May 2020 06:55) (69 - 82)  BP: 106/54 (23 May 2020 06:55) (106/54 - 122/82)  BP(mean): --  RR: 20 (23 May 2020 06:55) (20 - 20)  SpO2: --    PHYSICAL EXAM:    General: WDWN  HEENT: NC/AT; PERRL, clear conjunctiva  Neck: supple  Cardiovascular: +S1/S2; RRR  Respiratory: CTA b/l; no W/R/R  Gastrointestinal: soft, NT/ND; +BSx4  Extremities: WWP; 2+ peripheral pulses; no edema   Neurological: AAOx3; AAOx3; spastic quadriplegia, contracted extremities     MEDICATIONS:  MEDICATIONS  (STANDING):  baclofen 10 milliGRAM(s) Oral three times a day  calcium carbonate   1250 mG (OsCal) 1 Tablet(s) Oral two times a day  dicyclomine 10 milliGRAM(s) Oral daily  enoxaparin Injectable 40 milliGRAM(s) SubCutaneous at bedtime  lactated ringers. 1000 milliLiter(s) (75 mL/Hr) IV Continuous <Continuous>  magnesium oxide 400 milliGRAM(s) Oral three times a day with meals  meropenem  IVPB 1000 milliGRAM(s) IV Intermittent every 8 hours  PHENobarbital 64.8 milliGRAM(s) Oral daily  polyethylene glycol 3350 17 Gram(s) Oral every 12 hours  polyvinyl alcohol 1.4%/povidone 0.6% Ophthalmic Solution - Peds 1 Drop(s) Both EYES four times a day  senna 2 Tablet(s) Oral at bedtime  tamsulosin 0.4 milliGRAM(s) Oral at bedtime    MEDICATIONS  (PRN):  acetaminophen   Tablet .. 650 milliGRAM(s) Oral every 6 hours PRN Temp greater or equal to 38C (100.4F)  ALBUTerol    90 MICROgram(s) HFA Inhaler 2 Puff(s) Inhalation every 6 hours PRN Shortness of Breath and/or Wheezing  benzonatate 100 milliGRAM(s) Oral every 8 hours PRN Cough  ketorolac   Injectable 30 milliGRAM(s) IV Push every 6 hours PRN Severe Pain (7 - 10)  ondansetron Injectable 4 milliGRAM(s) IV Push every 6 hours PRN Nausea      ALLERGIES:  Allergies    No Known Allergies    Intolerances        LABS:                        11.4   4.12  )-----------( 170      ( 23 May 2020 06:54 )             33.7     05-23    140  |  101  |  14  ----------------------------<  104<H>  3.7   |  26  |  0.5<L>    Ca    8.0<L>      23 May 2020 06:54  Mg     1.7     05-22    TPro  6.0  /  Alb  2.9<L>  /  TBili  0.2  /  DBili  x   /  AST  22  /  ALT  18  /  AlkPhos  72  05-23    PT/INR - ( 22 May 2020 06:19 )   PT: 13.70 sec;   INR: 1.19 ratio         PTT - ( 22 May 2020 06:19 )  PTT:29.9 sec    CAPILLARY BLOOD GLUCOSE      POCT Blood Glucose.: 108 mg/dL (22 May 2020 06:23)      Culture - Blood (05.20.20 @ 16:20)    -  Coagulase negative Staphylococcus: Nondet    -  Enterococcus species: Nondet    -  Vancomycin resistant Enterococcus sp.: Nondet    -  Escherichia coli: Nondet    -  Klebsiella oxytoca: Nondet    -  Klebsiella pneumoniae: Nondet    -  Serratia marcescens: Nondet    -  Haemophilus influenzae: Nondet    -  Listeria monocytogenes: Nondet    -  Neisseria meningitidis: Nondet    -  Pseudomonas aeruginosa: Nondet    -  Acinetobacter baumanii: Nondet    -  Enterobacter cloacae complex: Nondet    -  Streptococcus sp. (Not Grp A, B or S pneumoniae): Nondet    -  Streptococcus agalactiae (Group B): Nondet    -  Streptococcus pyogenes (Group A): Nondet    -  Streptococcus pneumoniae: Nondet    -  Candida albicans: Nondet    -  Candida glabrata: Nondet    -  Candida krusei: Nondet    -  Candida parapsilosis: Nondet    -  Candida tropicalis: Nondet    -  Multidrug (KPC pos) resistant organism: Nondet    -  Staphylococcus aureus: Nondet    -  Methicillin resistant Staphylococcus aureus (MRSA): Nondet    Gram Stain:   Growth in anaerobic bottle: Gram Negative Rods  Growth in aerobic bottle: Gram Negative Rods    Specimen Source: .Blood Blood-Venous    Organism: Blood Culture PCR    Organism: Proteus mirabilis    Culture Results:   Growth in anaerobic bottle: Proteus mirabilis  Growth in aerobic bottle: Gram Negative Rods  "Due to technical problems, Proteus sp. will Not be reported as part of  the BCID panel until further notice"  ***Blood Panel PCR results on this specimen are available  approximately 3 hours after the Gram stain result.***  Gram stain, PCR, and/or culture results may not always  correspond due to difference in methodologies.  ************************************************************  This PCR assay was performed using [employer].  The following targets are tested for: Enterococcus,  vancomycin resistant enterococci, Listeria monocytogenes,  coagulase negative staphylococci, S. aureus,  methicillin resistant S. aureus, Streptococcus agalactiae  (Group B), S. pneumoniae, S. pyogenes (Group A),  Acinetobacter baumannii, Enterobacter cloacae, E. coli,  Klebsiella oxytoca, K. pneumoniae, Proteus sp.,  Serratia marcescens, Haemophilus influenzae,  Neisseria meningitidis, Pseudomonas aeruginosa, Candida  albicans, C. glabrata, C krusei, C parapsilosis,  C. tropicalis and the KPC resistance gene.    Organism Identification: Blood Culture PCR  Proteus mirabilis    Method Type: PCR    Method Type: MIGUEL A    Culture - Urine (05.20.20 @ 13:55)    -  Trimethoprim/Sulfamethoxazole: R >2/38    -  Tobramycin: S <=4    -  Cefoxitin: S <=8    -  Cefepime: R 8    -  Cefazolin: R >16 ([id number]) For uncomplicated UTI with K. pneumoniae, E. coli, or P. mirablis: MIGUEL A <=16 is sensitive and MIGUEL A >=32 is resistant. This also predicts results for oral agents cefaclor, cefdinir, cefpodoxime, cefprozil, cefuroxime axetil, cephalexin and locarbef for uncomplicated UTI. Note that some isolates may be susceptible to these agents while testing resistant to cefazolin.    -  Ceftriaxone: R 32 Enterobacter, Citrobacter, and Serratia may develop resistance during prolonged therapy    -  Ciprofloxacin: R >2    -  Ertapenem: S <=1    -  Gentamicin: S <=4    -  Levofloxacin: R >4    -  Nitrofurantoin: R >64 Should not be used to treat pyelonephritis    -  Meropenem: S <=1    -  Piperacillin/Tazobactam: R <=16    -  Ampicillin/Sulbactam: R <=8/4 Enterobacter, Citrobacter, and Serratia may develop resistance during prolonged therapy (3-4 days)    -  Aztreonam: R <=4    -  Ampicillin: R >16 These ampicillin results predict results for amoxicillin    -  Amikacin: S <=16    Specimen Source: .Urine None    Culture Results:   Moderate Proteus mirabilis ESBL  Moderate Staphylococcus aureus    Organism Identification: Proteus mirabilis ESBL    Organism: Proteus mirabilis ESBL    Method Type: MIGUEL A

## 2020-05-23 NOTE — PROGRESS NOTE ADULT - ASSESSMENT
Pt is a 63 yo M admitted with sepsis, R hydronephrosis 2/2 to an obstructing proximal R ureteral stone s/p R PCNU placement by IR on 5/20. Pt is scheduled for R PCNL on Tuesday 5/26    ·	Pt will need COVID swab on tomorrow, 5/23 and medical clearance   ·	Will d/w attng Pt is a 65 yo M admitted with sepsis, R hydronephrosis 2/2 to an obstructing proximal R ureteral stone s/p R PCNU placement by IR on 5/20. Pt is scheduled for R PCNL on Tuesday 5/26    ·	Pt will need medical clearance, COVID swab on tomorrow, 5/23   ·	Cont with IV abx as per ID   ·	Will d/w attng

## 2020-05-23 NOTE — PROGRESS NOTE ADULT - SUBJECTIVE AND OBJECTIVE BOX
Pt is a 63 yo M admitted with sepsis, R hydronephrosis 2/2 to an obstructing proximal R ureteral stone s/p R PCNU placement by IR on 5/20. Pt is scheduled for R PCNL on Tuesday 5/26. Pt seen and examined at bedside offering no complaints at this time. Denies abdominal pain, fevers, chills.     Vital Signs Last 24 Hrs  T(C): 37.2 (23 May 2020 06:55), Max: 37.2 (23 May 2020 06:55)  T(F): 98.9 (23 May 2020 06:55), Max: 98.9 (23 May 2020 06:55)  HR: 73 (23 May 2020 06:55) (69 - 82)  BP: 106/54 (23 May 2020 06:55) (106/54 - 122/82)  RR: 20 (23 May 2020 06:55) (20 - 20)    Review of Systems:  [X] A ten point review of systems was otherwise negative except as noted.    Physical exam  Gen: NAD  Neuro: AAOx3  Head: Atraumatic  Resp: Breathing non-labored   Abdomen: soft, obese abdomen, non-tender; +peg   : + 26 Fr SPT in place draining yellow urine; + R nephrostomy draining blood tinged urine, dressing C/D/I  Skin: Non-diaphoretic     Urine:   I&O's Summary    22 May 2020 07:01  -  23 May 2020 07:00  --------------------------------------------------------  IN: 1860 mL / OUT: 3225 mL / NET: -1365 mL        LABS:                        11.4   4.12  )-----------( 170      ( 23 May 2020 06:54 )             33.7     05-23    140  |  101  |  14  ----------------------------<  104<H>  3.7   |  26  |  0.5<L>    Ca    8.0<L>      23 May 2020 06:54  Mg     1.7     05-22    TPro  6.0  /  Alb  2.9<L>  /  TBili  0.2  /  DBili  x   /  AST  22  /  ALT  18  /  AlkPhos  72  05-23      Urine Culture:       Radiology    Prior notes/chart reviewed.

## 2020-05-24 LAB
ALBUMIN SERPL ELPH-MCNC: 2.9 G/DL — LOW (ref 3.5–5.2)
ALP SERPL-CCNC: 77 U/L — SIGNIFICANT CHANGE UP (ref 30–115)
ALT FLD-CCNC: 21 U/L — SIGNIFICANT CHANGE UP (ref 0–41)
ANION GAP SERPL CALC-SCNC: 10 MMOL/L — SIGNIFICANT CHANGE UP (ref 7–14)
AST SERPL-CCNC: 25 U/L — SIGNIFICANT CHANGE UP (ref 0–41)
BASOPHILS # BLD AUTO: 0.02 K/UL — SIGNIFICANT CHANGE UP (ref 0–0.2)
BASOPHILS NFR BLD AUTO: 0.5 % — SIGNIFICANT CHANGE UP (ref 0–1)
BILIRUB SERPL-MCNC: 0.2 MG/DL — SIGNIFICANT CHANGE UP (ref 0.2–1.2)
BUN SERPL-MCNC: 13 MG/DL — SIGNIFICANT CHANGE UP (ref 10–20)
CALCIUM SERPL-MCNC: 8.3 MG/DL — LOW (ref 8.5–10.1)
CHLORIDE SERPL-SCNC: 101 MMOL/L — SIGNIFICANT CHANGE UP (ref 98–110)
CO2 SERPL-SCNC: 28 MMOL/L — SIGNIFICANT CHANGE UP (ref 17–32)
CREAT SERPL-MCNC: 0.5 MG/DL — LOW (ref 0.7–1.5)
CULTURE RESULTS: SIGNIFICANT CHANGE UP
EOSINOPHIL # BLD AUTO: 0.32 K/UL — SIGNIFICANT CHANGE UP (ref 0–0.7)
EOSINOPHIL NFR BLD AUTO: 8.4 % — HIGH (ref 0–8)
GLUCOSE BLDC GLUCOMTR-MCNC: 106 MG/DL — HIGH (ref 70–99)
GLUCOSE SERPL-MCNC: 103 MG/DL — HIGH (ref 70–99)
HCT VFR BLD CALC: 33.7 % — LOW (ref 42–52)
HGB BLD-MCNC: 11.2 G/DL — LOW (ref 14–18)
IMM GRANULOCYTES NFR BLD AUTO: 0.3 % — SIGNIFICANT CHANGE UP (ref 0.1–0.3)
LYMPHOCYTES # BLD AUTO: 1.41 K/UL — SIGNIFICANT CHANGE UP (ref 1.2–3.4)
LYMPHOCYTES # BLD AUTO: 36.8 % — SIGNIFICANT CHANGE UP (ref 20.5–51.1)
MCHC RBC-ENTMCNC: 31.3 PG — HIGH (ref 27–31)
MCHC RBC-ENTMCNC: 33.2 G/DL — SIGNIFICANT CHANGE UP (ref 32–37)
MCV RBC AUTO: 94.1 FL — HIGH (ref 80–94)
MONOCYTES # BLD AUTO: 0.48 K/UL — SIGNIFICANT CHANGE UP (ref 0.1–0.6)
MONOCYTES NFR BLD AUTO: 12.5 % — HIGH (ref 1.7–9.3)
NEUTROPHILS # BLD AUTO: 1.59 K/UL — SIGNIFICANT CHANGE UP (ref 1.4–6.5)
NEUTROPHILS NFR BLD AUTO: 41.5 % — LOW (ref 42.2–75.2)
NRBC # BLD: 0 /100 WBCS — SIGNIFICANT CHANGE UP (ref 0–0)
ORGANISM # SPEC MICROSCOPIC CNT: SIGNIFICANT CHANGE UP
PLATELET # BLD AUTO: 172 K/UL — SIGNIFICANT CHANGE UP (ref 130–400)
POTASSIUM SERPL-MCNC: 4.2 MMOL/L — SIGNIFICANT CHANGE UP (ref 3.5–5)
POTASSIUM SERPL-SCNC: 4.2 MMOL/L — SIGNIFICANT CHANGE UP (ref 3.5–5)
PROT SERPL-MCNC: 6.2 G/DL — SIGNIFICANT CHANGE UP (ref 6–8)
RBC # BLD: 3.58 M/UL — LOW (ref 4.7–6.1)
RBC # FLD: 13.8 % — SIGNIFICANT CHANGE UP (ref 11.5–14.5)
SODIUM SERPL-SCNC: 139 MMOL/L — SIGNIFICANT CHANGE UP (ref 135–146)
SPECIMEN SOURCE: SIGNIFICANT CHANGE UP
WBC # BLD: 3.83 K/UL — LOW (ref 4.8–10.8)
WBC # FLD AUTO: 3.83 K/UL — LOW (ref 4.8–10.8)

## 2020-05-24 PROCEDURE — 99233 SBSQ HOSP IP/OBS HIGH 50: CPT

## 2020-05-24 RX ADMIN — SODIUM CHLORIDE 75 MILLILITER(S): 9 INJECTION, SOLUTION INTRAVENOUS at 13:00

## 2020-05-24 RX ADMIN — Medication 1 DROP(S): at 00:04

## 2020-05-24 RX ADMIN — MEROPENEM 100 MILLIGRAM(S): 1 INJECTION INTRAVENOUS at 21:33

## 2020-05-24 RX ADMIN — Medication 10 MILLIGRAM(S): at 05:18

## 2020-05-24 RX ADMIN — MEROPENEM 100 MILLIGRAM(S): 1 INJECTION INTRAVENOUS at 12:00

## 2020-05-24 RX ADMIN — Medication 1 TABLET(S): at 05:18

## 2020-05-24 RX ADMIN — SENNA PLUS 2 TABLET(S): 8.6 TABLET ORAL at 21:34

## 2020-05-24 RX ADMIN — Medication 1 DROP(S): at 05:17

## 2020-05-24 RX ADMIN — Medication 1 DROP(S): at 17:07

## 2020-05-24 RX ADMIN — MEROPENEM 100 MILLIGRAM(S): 1 INJECTION INTRAVENOUS at 05:19

## 2020-05-24 RX ADMIN — ENOXAPARIN SODIUM 40 MILLIGRAM(S): 100 INJECTION SUBCUTANEOUS at 21:32

## 2020-05-24 RX ADMIN — MAGNESIUM OXIDE 400 MG ORAL TABLET 400 MILLIGRAM(S): 241.3 TABLET ORAL at 05:19

## 2020-05-24 RX ADMIN — SODIUM CHLORIDE 75 MILLILITER(S): 9 INJECTION, SOLUTION INTRAVENOUS at 00:08

## 2020-05-24 RX ADMIN — Medication 64.8 MILLIGRAM(S): at 11:06

## 2020-05-24 RX ADMIN — POLYETHYLENE GLYCOL 3350 17 GRAM(S): 17 POWDER, FOR SOLUTION ORAL at 17:07

## 2020-05-24 RX ADMIN — Medication 1 DROP(S): at 11:06

## 2020-05-24 RX ADMIN — Medication 10 MILLIGRAM(S): at 21:32

## 2020-05-24 RX ADMIN — Medication 10 MILLIGRAM(S): at 13:13

## 2020-05-24 RX ADMIN — Medication 10 MILLIGRAM(S): at 11:05

## 2020-05-24 RX ADMIN — MAGNESIUM OXIDE 400 MG ORAL TABLET 400 MILLIGRAM(S): 241.3 TABLET ORAL at 13:13

## 2020-05-24 RX ADMIN — MAGNESIUM OXIDE 400 MG ORAL TABLET 400 MILLIGRAM(S): 241.3 TABLET ORAL at 21:32

## 2020-05-24 RX ADMIN — Medication 1 TABLET(S): at 17:07

## 2020-05-24 NOTE — PROGRESS NOTE ADULT - SUBJECTIVE AND OBJECTIVE BOX
OVERNIGHT EVENTS: events noted, on RA, blood cx reviewed    Vital Signs Last 24 Hrs  T(C): 37.1 (24 May 2020 04:59), Max: 37.7 (23 May 2020 14:26)  T(F): 98.7 (24 May 2020 04:59), Max: 99.9 (23 May 2020 14:26)  HR: 65 (24 May 2020 04:59) (65 - 86)  BP: 94/55 (24 May 2020 04:59) (94/55 - 106/54)  RR: 18 (24 May 2020 04:59) (18 - 19)  SpO2: 92% RA    PHYSICAL EXAMINATION:    GENERAL: ill looking    HEENT: Head is normocephalic and atraumatic. Extraocular muscles are intact. Mucous membranes are moist.    NECK: Supple.    LUNGS: dec bs l base    HEART: Regular rate and rhythm without murmur.    ABDOMEN: Soft, nontender, and nondistended.      EXTREMITIES: Without any cyanosis, clubbing, rash, lesions or edema.    NEUROLOGIC: contracted        LABS:                        11.4   4.12  )-----------( 170      ( 23 May 2020 06:54 )             33.7     05-23    140  |  101  |  14  ----------------------------<  104<H>  3.7   |  26  |  0.5<L>    Ca    8.0<L>      23 May 2020 06:54    TPro  6.0  /  Alb  2.9<L>  /  TBili  0.2  /  DBili  x   /  AST  22  /  ALT  18  /  AlkPhos  72  05-23 05-23-20 @ 07:01 - 05-24-20 @ 07:00  --------------------------------------------------------  IN: 2145 mL / OUT: 1720 mL / NET: 425 mL    05-24-20 @ 07:01  - 05-24-20 @ 08:46  --------------------------------------------------------  IN: 150 mL / OUT: 0 mL / NET: 150 mL        MICROBIOLOGY:  Culture Results:   No growth to date. (05-22 @ 06:19)  Culture Results:   Growth in anaerobic bottle: Proteus mirabilis ESBL  Growth in aerobic bottle: Gram Negative Rods  "Due to technical problems, Proteus sp. will Not be reported as part of  the BCID panel until further notice"  ***Blood Panel PCR results on this specimen are available  approximately 3 hours after the Gram stain result.***  Gram stain, PCR, and/or culture results may not always  correspond due to difference in methodologies.  ************************************************************  This PCR assay was performed using [employer].  The following targets are tested for: Enterococcus,  vancomycin resistant enterococci, Listeria monocytogenes,  coagulase negative staphylococci, S. aureus,  methicillin resistant S. aureus, Streptococcus agalactiae  (Group B), S. pneumoniae, S. pyogenes (Group A),  Acinetobacter baumannii, Enterobacter cloacae, E. coli,  Klebsiella oxytoca, K. pneumoniae, Proteus sp.,  Serratia marcescens, Haemophilus influenzae,  Neisseria meningitidis, Pseudomonas aeruginosa, Candida  albicans, C. glabrata, C krusei, C parapsilosis,  C. tropicalis and the KPC resistance gene. (05-20 @ 16:20)  Culture Results:   Moderate Proteus mirabilis ESBL  Moderate Staphylococcus aureus (05-20 @ 13:55)      MEDICATIONS  (STANDING):  baclofen 10 milliGRAM(s) Oral three times a day  calcium carbonate   1250 mG (OsCal) 1 Tablet(s) Oral two times a day  dicyclomine 10 milliGRAM(s) Oral daily  enoxaparin Injectable 40 milliGRAM(s) SubCutaneous at bedtime  lactated ringers. 1000 milliLiter(s) (75 mL/Hr) IV Continuous <Continuous>  magnesium oxide 400 milliGRAM(s) Oral three times a day with meals  meropenem  IVPB 1000 milliGRAM(s) IV Intermittent every 8 hours  PHENobarbital 64.8 milliGRAM(s) Oral daily  polyethylene glycol 3350 17 Gram(s) Oral every 12 hours  polyvinyl alcohol 1.4%/povidone 0.6% Ophthalmic Solution - Peds 1 Drop(s) Both EYES four times a day  senna 2 Tablet(s) Oral at bedtime  tamsulosin 0.4 milliGRAM(s) Oral at bedtime    MEDICATIONS  (PRN):  acetaminophen   Tablet .. 650 milliGRAM(s) Oral every 6 hours PRN Temp greater or equal to 38C (100.4F)  ALBUTerol    90 MICROgram(s) HFA Inhaler 2 Puff(s) Inhalation every 6 hours PRN Shortness of Breath and/or Wheezing  benzonatate 100 milliGRAM(s) Oral every 8 hours PRN Cough  ketorolac   Injectable 30 milliGRAM(s) IV Push every 6 hours PRN Severe Pain (7 - 10)  ondansetron Injectable 4 milliGRAM(s) IV Push every 6 hours PRN Nausea      RADIOLOGY & ADDITIONAL STUDIES:

## 2020-05-24 NOTE — PROGRESS NOTE ADULT - ASSESSMENT
Patient is a 63 y/o male with PMH of cerebral palsy, seizure disorder, spastic paraplegia, s/p PEG tube, Asthma, H/O nephrolithiasis, BPH with bladder neck stricture s/p suprapubic catheter, and H/O Pseudomonas and ESBL Proteus mirabilis bacteruria who presented from a group with abdominal pain x1 days.       #Sepsis due to UTI (resolving) 2/2 pyelonephritis with hydronephrosis and ureter stones with proteus ESBL bacteruria and bacteremia   - s/p R PCT nephrostomy with stent on 5/20  - needs Percutaneous nephrolithotomy (PCNL) for stent removal, scheduled on 5/26, COVID swap done on 5/24 for preop clearance   - Pain control with Toradol PRN, monitor renal function   - IVF, Tamsulosin and Bentyl to facilitate passage   - c/w Meropenem (proteus ESBL bacteruria and bacteremia)  - f/u repeat Bcx   -pulm and cardio clearance on chart (mod-high risk for periop complications)    #LLL opacity/Chronic Lt diaphragm elevation  -a-Sx  -on RA      #lactic acidosis: (reolved) possibly seizure after nephrostomy  with subtherapeutic phenobarb level  - phenobarb level is low, REEG  no seizure, just slowing, as per Neuro (spoke with Dr. Shipley), no dose adjustment needed,   - lactate level normalized      #normocytic anemia: Hbg stable, likely dilutional, no signs of blood loss, monitor Hbg and keep active type and screening     #Abd distention: resolved, normal KUB  - PEG tube feeding  - bowel regimen     # URI symptoms w/ COVID contact in last 2 weeks -COVID PCR negative on 5/19, has lymphopenia looks chronic     # Seizure disorder - Continue with phenobarbital 64.8mg PO through PEG tube QD    # Cerebral palsy w/ Spastic quadriplegia - Continue with baclofen 10mg PO three times a day, c/w PEG feeds     # Functional quadriplegia - full care    # Asthma - No wheezing on exam, on RA, Albuterol PRN     # BPH with bladder neck stricture s/p suprapubic catheter - Tube replaced day before admission, Chronic colonization w/ ESBL Proteus Mirabilis, grew Pseudomonas on last admission,    #DVT Prophylaxis: Lovenox 40mg SQ once daily,     #GI Prophylaxis: Not indicated     #Diet: through PEG tube feeding    Activity: bed rest     Dispo: [facility], , Leslie ([phone]) is the sister and health care proxy, Mitch is the nurse at [phone],   Code Status: Full Code    #Progress Note Handoff  Pending (specify): _Clinical improvement and stability__x__Tests__ Cx, PCNL___, PT________  Pt/Family discussion: Pt informed and agrees with the current plan  Disposition: Home__x____SNF_______To be determined________ .     35 minutes spent on total encounter; more than 50% of the visit was spent counseling and/or coordinating care by the attending physician.

## 2020-05-24 NOTE — PROGRESS NOTE ADULT - SUBJECTIVE AND OBJECTIVE BOX
Pt is a 65 yo M admitted with sepsis, R hydronephrosis 2/2 to an obstructing proximal R ureteral stone s/p R PCNU placement by IR on 5/20. Pt is scheduled for R PCNL on Tuesday 5/26. Pt seen and examined at bedside. Denies fevers, chills, flank pain, abdominal pain.    MEDICATIONS  (STANDING):  baclofen 10 milliGRAM(s) Oral three times a day  calcium carbonate   1250 mG (OsCal) 1 Tablet(s) Oral two times a day  dicyclomine 10 milliGRAM(s) Oral daily  enoxaparin Injectable 40 milliGRAM(s) SubCutaneous at bedtime  lactated ringers. 1000 milliLiter(s) (75 mL/Hr) IV Continuous <Continuous>  magnesium oxide 400 milliGRAM(s) Oral three times a day with meals  meropenem  IVPB 1000 milliGRAM(s) IV Intermittent every 8 hours  PHENobarbital 64.8 milliGRAM(s) Oral daily  polyethylene glycol 3350 17 Gram(s) Oral every 12 hours  polyvinyl alcohol 1.4%/povidone 0.6% Ophthalmic Solution - Peds 1 Drop(s) Both EYES four times a day  senna 2 Tablet(s) Oral at bedtime  tamsulosin 0.4 milliGRAM(s) Oral at bedtime    MEDICATIONS  (PRN):  acetaminophen   Tablet .. 650 milliGRAM(s) Oral every 6 hours PRN Temp greater or equal to 38C (100.4F)  ALBUTerol    90 MICROgram(s) HFA Inhaler 2 Puff(s) Inhalation every 6 hours PRN Shortness of Breath and/or Wheezing  benzonatate 100 milliGRAM(s) Oral every 8 hours PRN Cough  ketorolac   Injectable 30 milliGRAM(s) IV Push every 6 hours PRN Severe Pain (7 - 10)  ondansetron Injectable 4 milliGRAM(s) IV Push every 6 hours PRN Nausea    Review of Systems:  [X] A ten point review of systems was otherwise negative except as noted.    Vital Signs Last 24 Hrs  T(C): 37.1 (24 May 2020 04:59), Max: 37.7 (23 May 2020 14:26)  T(F): 98.7 (24 May 2020 04:59), Max: 99.9 (23 May 2020 14:26)  HR: 65 (24 May 2020 04:59) (65 - 86)  BP: 94/55 (24 May 2020 04:59) (94/55 - 106/54)  RR: 18 (24 May 2020 04:59) (18 - 19)    PHYSICAL EXAM:  GEN: NAD  NEURO: AAOx3  SKIN: Good color, non diaphoretic.  HEENT: Atraumatic  RESP: No dyspnea, non-labored breathing.   ABDO: Soft, obese abdomen, non-tender, +PEG   : +R nephrostomy bag draining blood-tinged urine; +26Fr SPT in place draining clear, yellow urine  BACK: +R nephrostomy dressing C/D/I    I&O's Summary    23 May 2020 07:01  -  24 May 2020 07:00  --------------------------------------------------------  IN: 2505 mL / OUT: 1720 mL / NET: 785 mL    24 May 2020 07:01  -  24 May 2020 08:59  --------------------------------------------------------  IN: 200 mL / OUT: 0 mL / NET: 200 mL      LABS:                        11.2   3.83  )-----------( 172      ( 24 May 2020 07:06 )             33.7     05-23    140  |  101  |  14  ----------------------------<  104<H>  3.7   |  26  |  0.5<L>    Ca    8.0<L>      23 May 2020 06:54    TPro  6.0  /  Alb  2.9<L>  /  TBili  0.2  /  DBili  x   /  AST  22  /  ALT  18  /  AlkPhos  72  05-23

## 2020-05-24 NOTE — PROGRESS NOTE ADULT - ASSESSMENT
Pt is a 63 yo M admitted with sepsis, R hydronephrosis 2/2 to an obstructing proximal R ureteral stone s/p R PCNU placement by IR on 5/20. Pt is scheduled for R PCNL on Tuesday 5/26.    ·	COVID swab done this am; f/u results   ·	Medical clearance   ·	Cardio consult/clearance appreciated ->intermediate risk   ·	Cont with IV abx as per ID recs  ·	Cont with SPT and nephrostomy care   ·	Will d/w attng

## 2020-05-24 NOTE — PROGRESS NOTE ADULT - ASSESSMENT
ssessment:    Sepsis 2ry to obstructing kidney stones/ proteus bacteremia  Seizures  nephrolithiasis s/p nephrostomy tube  LANA improved      PLAN:    - IV ABX  - KEEP SAO2 92 TO 96%  - ASPIRATION PRECAUTION  - MODERATE RISK FOR PROCEDURE  - DVT PROPHYLAXIS  - POOR PROGNOSIS

## 2020-05-24 NOTE — PROGRESS NOTE ADULT - SUBJECTIVE AND OBJECTIVE BOX
Patient is a 64y old  Male who presents with a chief complaint of sepsis (24 May 2020 08:45)    INTERVAL HPI/OVERNIGHT EVENTS: no complaints, feels better  ROS: Denies CP, SOB, AP, new weakness  All other systems reviewed and are within normal limits.  InitialHPI:  Patient is a 63 y/o male with PMH of cerebral palsy, seizure disorder, spastic paraplegia, s/p PEG tube, Asthma, H/O nephrolithiasis, BPH with bladder neck stricture s/p suprapubic catheter, and H/O Pseudomonas and ESBL Proteus mirabilis bacteruria who presented from a group with abdominal pain x1 days. Pt states that he started getting a R sided flank pain with radiation to his pelvis after getting his suprapubic cathter tube changes. Pt states that the pain is sharp and constant. Pt also endorses some nausea and vomited x1 in the AM. He also has some pain at the catheter insertion site however catheter has been draining well. Off note pt has a progressive worsening congestion for the last few days. As per aid, pts group home had a resident test postive for COVID. Pt was likely in close contact with resident. Pt also endorses recent subjective fevers. He has had a cough for the last month, pt was recently admitted to the ICU for non-COVID coronavirus bronchitis, never intubated. Denies any other complaints. Denies any chills, changes in weight, chest pain, SOB, diarrhea, myalgias, arthralgias syncope, fatigue.   In the ED, /67  RR 18 SpO2 94 on RA afebrile. CT A/P showed a [id number] proximal right ureteral stone with mod hydronephrosis. Urolgoy was consulted. Pt was given IVF, pain control and Merox1. Swabbed for COVID. (19 May 2020 08:44)    KIDNEY STONE;HYDRONEPHROSIS;URINARY TRACT INFECTION  ^KIDNEY STONE;HYDRONEPHROSIS;URINARY TRACT INFECTION  Family history unknown  No pertinent family history in first degree relatives  Handoff  MEWS Score  Asthma  Urinary retention  Urinary calculi  Spastic quadriplegia  Osteoporosis  Seizure  Cerebral palsy  BPH (benign prostatic hyperplasia)  Kidney stone  Suprapubic catheter  History of suprapubic catheter  H/O cystoscopy  S/P percutaneous endoscopic gastrostomy (PEG) tube placement  CATHETER PROBLEM  52  Urinary tract infection  Hydronephrosis    PAST MEDICAL & SURGICAL HISTORY:  Asthma  Urinary retention  Urinary calculi  Spastic quadriplegia  Osteoporosis  Seizure: last seizure &gt;10 years ago  Cerebral palsy  BPH (benign prostatic hyperplasia)  Suprapubic catheter  H/O cystoscopy  S/P percutaneous endoscopic gastrostomy (PEG) tube placement      General: NAD, AAO3, mild dysarthria  CV: S1 S2  Resp: decreased breath sounds at bases  GI: NT/ND/S +BS, +PEG, +suprapubic cath +Rt urethral cath  MS: contracted b/l UE and LE, spastic quadriplegia      MEDICATIONS  (STANDING):  baclofen 10 milliGRAM(s) Oral three times a day  calcium carbonate   1250 mG (OsCal) 1 Tablet(s) Oral two times a day  dicyclomine 10 milliGRAM(s) Oral daily  enoxaparin Injectable 40 milliGRAM(s) SubCutaneous at bedtime  lactated ringers. 1000 milliLiter(s) (75 mL/Hr) IV Continuous <Continuous>  magnesium oxide 400 milliGRAM(s) Oral three times a day with meals  meropenem  IVPB 1000 milliGRAM(s) IV Intermittent every 8 hours  PHENobarbital 64.8 milliGRAM(s) Oral daily  polyethylene glycol 3350 17 Gram(s) Oral every 12 hours  polyvinyl alcohol 1.4%/povidone 0.6% Ophthalmic Solution - Peds 1 Drop(s) Both EYES four times a day  senna 2 Tablet(s) Oral at bedtime  tamsulosin 0.4 milliGRAM(s) Oral at bedtime    MEDICATIONS  (PRN):  acetaminophen   Tablet .. 650 milliGRAM(s) Oral every 6 hours PRN Temp greater or equal to 38C (100.4F)  ALBUTerol    90 MICROgram(s) HFA Inhaler 2 Puff(s) Inhalation every 6 hours PRN Shortness of Breath and/or Wheezing  benzonatate 100 milliGRAM(s) Oral every 8 hours PRN Cough  ketorolac   Injectable 30 milliGRAM(s) IV Push every 6 hours PRN Severe Pain (7 - 10)  ondansetron Injectable 4 milliGRAM(s) IV Push every 6 hours PRN Nausea    Home Medications:  Artificial Tears ophthalmic solution: 1 drop(s) to each affected eye 4 times a day (20 Mar 2020 21:56)  baclofen 10 mg oral tablet: 1 tab(s) by gastrostomy tube 3 times a day (20 Mar 2020 21:56)  docusate sodium 60 mg/15 mL oral syrup: 100 milligram(s) by gastrostomy tube once a day, As Needed for constipation (20 Mar 2020 21:56)  guaifenesin-dextromethorphan 100 mg-10 mg/5 mL oral liquid: 5 milliliter(s) orally every 6 hours, As needed, Cough (27 Mar 2020 11:14)  Oysco 500 (1250 mg calcium carbonate) oral tablet: 1 tab(s) by gastrostomy tube 2 times a day (20 Mar 2020 21:56)  PHENobarbital 64.8 mg oral tablet: 1 tab(s) orally once a day via G tube (20 Mar 2020 21:56)  Ventolin HFA 90 mcg/inh inhalation aerosol: 2 puff(s) inhaled 4 times a day, As Needed (20 Mar 2020 21:56)    Vital Signs Last 24 Hrs  T(C): 37.1 (24 May 2020 04:59), Max: 37.7 (23 May 2020 14:26)  T(F): 98.7 (24 May 2020 04:59), Max: 99.9 (23 May 2020 14:26)  HR: 65 (24 May 2020 04:59) (65 - 86)  BP: 94/55 (24 May 2020 04:59) (94/55 - 106/54)  BP(mean): --  RR: 18 (24 May 2020 04:59) (18 - 19)  SpO2: --  CAPILLARY BLOOD GLUCOSE      POCT Blood Glucose.: 106 mg/dL (24 May 2020 06:28)    LABS:                        11.2   3.83  )-----------( 172      ( 24 May 2020 07:06 )             33.7     05-24    139  |  101  |  13  ----------------------------<  103<H>  4.2   |  28  |  0.5<L>    Ca    8.3<L>      24 May 2020 07:06    TPro  6.2  /  Alb  2.9<L>  /  TBili  0.2  /  DBili  x   /  AST  25  /  ALT  21  /  AlkPhos  77  05-24    LIVER FUNCTIONS - ( 24 May 2020 07:06 )  Alb: 2.9 g/dL / Pro: 6.2 g/dL / ALK PHOS: 77 U/L / ALT: 21 U/L / AST: 25 U/L / GGT: x                           Culture - Blood (collected 22 May 2020 06:19)  Source: .Blood None  Preliminary Report (23 May 2020 12:01):    No growth to date.      Chart, Consultant(s) Notes Reviewed:  [x ] YES  [ ] NO  Care Discussed with Consultants/Other Providers/ Housestaff [ x] YES  [ ] NO  Radiology, labs, old available records personally reviewed.

## 2020-05-25 LAB
ALBUMIN SERPL ELPH-MCNC: 2.9 G/DL — LOW (ref 3.5–5.2)
ALP SERPL-CCNC: 83 U/L — SIGNIFICANT CHANGE UP (ref 30–115)
ALT FLD-CCNC: 36 U/L — SIGNIFICANT CHANGE UP (ref 0–41)
ANION GAP SERPL CALC-SCNC: 11 MMOL/L — SIGNIFICANT CHANGE UP (ref 7–14)
AST SERPL-CCNC: 41 U/L — SIGNIFICANT CHANGE UP (ref 0–41)
BASOPHILS # BLD AUTO: 0.02 K/UL — SIGNIFICANT CHANGE UP (ref 0–0.2)
BASOPHILS NFR BLD AUTO: 0.5 % — SIGNIFICANT CHANGE UP (ref 0–1)
BILIRUB SERPL-MCNC: 0.2 MG/DL — SIGNIFICANT CHANGE UP (ref 0.2–1.2)
BLD GP AB SCN SERPL QL: SIGNIFICANT CHANGE UP
BUN SERPL-MCNC: 13 MG/DL — SIGNIFICANT CHANGE UP (ref 10–20)
CALCIUM SERPL-MCNC: 8.6 MG/DL — SIGNIFICANT CHANGE UP (ref 8.5–10.1)
CHLORIDE SERPL-SCNC: 101 MMOL/L — SIGNIFICANT CHANGE UP (ref 98–110)
CO2 SERPL-SCNC: 26 MMOL/L — SIGNIFICANT CHANGE UP (ref 17–32)
CREAT SERPL-MCNC: <0.5 MG/DL — LOW (ref 0.7–1.5)
EOSINOPHIL # BLD AUTO: 0.29 K/UL — SIGNIFICANT CHANGE UP (ref 0–0.7)
EOSINOPHIL NFR BLD AUTO: 6.6 % — SIGNIFICANT CHANGE UP (ref 0–8)
GLUCOSE SERPL-MCNC: 123 MG/DL — HIGH (ref 70–99)
HCT VFR BLD CALC: 34.2 % — LOW (ref 42–52)
HGB BLD-MCNC: 11.6 G/DL — LOW (ref 14–18)
IMM GRANULOCYTES NFR BLD AUTO: 0.7 % — HIGH (ref 0.1–0.3)
LYMPHOCYTES # BLD AUTO: 1.25 K/UL — SIGNIFICANT CHANGE UP (ref 1.2–3.4)
LYMPHOCYTES # BLD AUTO: 28.5 % — SIGNIFICANT CHANGE UP (ref 20.5–51.1)
MAGNESIUM SERPL-MCNC: 1.9 MG/DL — SIGNIFICANT CHANGE UP (ref 1.8–2.4)
MCHC RBC-ENTMCNC: 32.2 PG — HIGH (ref 27–31)
MCHC RBC-ENTMCNC: 33.9 G/DL — SIGNIFICANT CHANGE UP (ref 32–37)
MCV RBC AUTO: 95 FL — HIGH (ref 80–94)
MONOCYTES # BLD AUTO: 0.47 K/UL — SIGNIFICANT CHANGE UP (ref 0.1–0.6)
MONOCYTES NFR BLD AUTO: 10.7 % — HIGH (ref 1.7–9.3)
NEUTROPHILS # BLD AUTO: 2.32 K/UL — SIGNIFICANT CHANGE UP (ref 1.4–6.5)
NEUTROPHILS NFR BLD AUTO: 53 % — SIGNIFICANT CHANGE UP (ref 42.2–75.2)
NRBC # BLD: 0 /100 WBCS — SIGNIFICANT CHANGE UP (ref 0–0)
PLATELET # BLD AUTO: 191 K/UL — SIGNIFICANT CHANGE UP (ref 130–400)
POTASSIUM SERPL-MCNC: 4.7 MMOL/L — SIGNIFICANT CHANGE UP (ref 3.5–5)
POTASSIUM SERPL-SCNC: 4.7 MMOL/L — SIGNIFICANT CHANGE UP (ref 3.5–5)
PROT SERPL-MCNC: 6.1 G/DL — SIGNIFICANT CHANGE UP (ref 6–8)
RBC # BLD: 3.6 M/UL — LOW (ref 4.7–6.1)
RBC # FLD: 13.9 % — SIGNIFICANT CHANGE UP (ref 11.5–14.5)
SARS-COV-2 RNA SPEC QL NAA+PROBE: SIGNIFICANT CHANGE UP
SODIUM SERPL-SCNC: 138 MMOL/L — SIGNIFICANT CHANGE UP (ref 135–146)
WBC # BLD: 4.38 K/UL — LOW (ref 4.8–10.8)
WBC # FLD AUTO: 4.38 K/UL — LOW (ref 4.8–10.8)

## 2020-05-25 PROCEDURE — 99232 SBSQ HOSP IP/OBS MODERATE 35: CPT

## 2020-05-25 PROCEDURE — 99233 SBSQ HOSP IP/OBS HIGH 50: CPT

## 2020-05-25 RX ADMIN — MEROPENEM 100 MILLIGRAM(S): 1 INJECTION INTRAVENOUS at 20:01

## 2020-05-25 RX ADMIN — Medication 1 TABLET(S): at 05:10

## 2020-05-25 RX ADMIN — SENNA PLUS 2 TABLET(S): 8.6 TABLET ORAL at 21:14

## 2020-05-25 RX ADMIN — TAMSULOSIN HYDROCHLORIDE 0.4 MILLIGRAM(S): 0.4 CAPSULE ORAL at 21:14

## 2020-05-25 RX ADMIN — MAGNESIUM OXIDE 400 MG ORAL TABLET 400 MILLIGRAM(S): 241.3 TABLET ORAL at 05:10

## 2020-05-25 RX ADMIN — Medication 1 DROP(S): at 00:04

## 2020-05-25 RX ADMIN — MEROPENEM 100 MILLIGRAM(S): 1 INJECTION INTRAVENOUS at 12:20

## 2020-05-25 RX ADMIN — Medication 1 DROP(S): at 05:09

## 2020-05-25 RX ADMIN — MAGNESIUM OXIDE 400 MG ORAL TABLET 400 MILLIGRAM(S): 241.3 TABLET ORAL at 12:15

## 2020-05-25 RX ADMIN — Medication 1 DROP(S): at 12:16

## 2020-05-25 RX ADMIN — Medication 1 TABLET(S): at 18:02

## 2020-05-25 RX ADMIN — Medication 10 MILLIGRAM(S): at 21:14

## 2020-05-25 RX ADMIN — Medication 1 DROP(S): at 18:04

## 2020-05-25 RX ADMIN — MEROPENEM 100 MILLIGRAM(S): 1 INJECTION INTRAVENOUS at 05:10

## 2020-05-25 RX ADMIN — POLYETHYLENE GLYCOL 3350 17 GRAM(S): 17 POWDER, FOR SOLUTION ORAL at 18:03

## 2020-05-25 RX ADMIN — Medication 10 MILLIGRAM(S): at 12:15

## 2020-05-25 RX ADMIN — Medication 64.8 MILLIGRAM(S): at 12:21

## 2020-05-25 RX ADMIN — Medication 10 MILLIGRAM(S): at 05:10

## 2020-05-25 RX ADMIN — SODIUM CHLORIDE 75 MILLILITER(S): 9 INJECTION, SOLUTION INTRAVENOUS at 06:24

## 2020-05-25 RX ADMIN — POLYETHYLENE GLYCOL 3350 17 GRAM(S): 17 POWDER, FOR SOLUTION ORAL at 05:11

## 2020-05-25 RX ADMIN — Medication 1 DROP(S): at 23:43

## 2020-05-25 RX ADMIN — ENOXAPARIN SODIUM 40 MILLIGRAM(S): 100 INJECTION SUBCUTANEOUS at 21:15

## 2020-05-25 RX ADMIN — SODIUM CHLORIDE 75 MILLILITER(S): 9 INJECTION, SOLUTION INTRAVENOUS at 19:40

## 2020-05-25 NOTE — PROGRESS NOTE ADULT - SUBJECTIVE AND OBJECTIVE BOX
Patient is a 64y old  Male who presents with a chief complaint of sepsis (24 May 2020 08:45)    INTERVAL HPI/OVERNIGHT EVENTS: no complaints, feels better  ROS: Denies CP, SOB, AP, new weakness  All other systems reviewed and are within normal limits.  InitialHPI:  Patient is a 65 y/o male with PMH of cerebral palsy, seizure disorder, spastic paraplegia, s/p PEG tube, Asthma, H/O nephrolithiasis, BPH with bladder neck stricture s/p suprapubic catheter, and H/O Pseudomonas and ESBL Proteus mirabilis bacteruria who presented from a group with abdominal pain x1 days. Pt states that he started getting a R sided flank pain with radiation to his pelvis after getting his suprapubic cathter tube changes. Pt states that the pain is sharp and constant. Pt also endorses some nausea and vomited x1 in the AM. He also has some pain at the catheter insertion site however catheter has been draining well. Off note pt has a progressive worsening congestion for the last few days. As per aid, pts group home had a resident test postive for COVID. Pt was likely in close contact with resident. Pt also endorses recent subjective fevers. He has had a cough for the last month, pt was recently admitted to the ICU for non-COVID coronavirus bronchitis, never intubated. Denies any other complaints. Denies any chills, changes in weight, chest pain, SOB, diarrhea, myalgias, arthralgias syncope, fatigue.   In the ED, /67  RR 18 SpO2 94 on RA afebrile. CT A/P showed a [id number] proximal right ureteral stone with mod hydronephrosis. Urolgoy was consulted. Pt was given IVF, pain control and Merox1. Swabbed for COVID. (19 May 2020 08:44)    KIDNEY STONE;HYDRONEPHROSIS;URINARY TRACT INFECTION  ^KIDNEY STONE;HYDRONEPHROSIS;URINARY TRACT INFECTION  Family history unknown  No pertinent family history in first degree relatives  Handoff  MEWS Score  Asthma  Urinary retention  Urinary calculi  Spastic quadriplegia  Osteoporosis  Seizure  Cerebral palsy  BPH (benign prostatic hyperplasia)  Kidney stone  Suprapubic catheter  History of suprapubic catheter  H/O cystoscopy  S/P percutaneous endoscopic gastrostomy (PEG) tube placement  CATHETER PROBLEM  52  Urinary tract infection  Hydronephrosis    PAST MEDICAL & SURGICAL HISTORY:  Asthma  Urinary retention  Urinary calculi  Spastic quadriplegia  Osteoporosis  Seizure: last seizure &gt;10 years ago  Cerebral palsy  BPH (benign prostatic hyperplasia)  Suprapubic catheter  H/O cystoscopy  S/P percutaneous endoscopic gastrostomy (PEG) tube placement      General: NAD, AAO3, mild dysarthria  CV: S1 S2  Resp: decreased breath sounds at bases  GI: NT/ND/S +BS, +PEG, +suprapubic cath +Rt urethral cath  MS: contracted b/l UE and LE, spastic quadriplegia            MEDICATIONS  (STANDING):  baclofen 10 milliGRAM(s) Oral three times a day  calcium carbonate   1250 mG (OsCal) 1 Tablet(s) Oral two times a day  dicyclomine 10 milliGRAM(s) Oral daily  enoxaparin Injectable 40 milliGRAM(s) SubCutaneous at bedtime  lactated ringers. 1000 milliLiter(s) (75 mL/Hr) IV Continuous <Continuous>  magnesium oxide 400 milliGRAM(s) Oral three times a day with meals  meropenem  IVPB 1000 milliGRAM(s) IV Intermittent every 8 hours  PHENobarbital 64.8 milliGRAM(s) Oral daily  polyethylene glycol 3350 17 Gram(s) Oral every 12 hours  polyvinyl alcohol 1.4%/povidone 0.6% Ophthalmic Solution - Peds 1 Drop(s) Both EYES four times a day  senna 2 Tablet(s) Oral at bedtime  tamsulosin 0.4 milliGRAM(s) Oral at bedtime    MEDICATIONS  (PRN):  acetaminophen   Tablet .. 650 milliGRAM(s) Oral every 6 hours PRN Temp greater or equal to 38C (100.4F)  ALBUTerol    90 MICROgram(s) HFA Inhaler 2 Puff(s) Inhalation every 6 hours PRN Shortness of Breath and/or Wheezing  benzonatate 100 milliGRAM(s) Oral every 8 hours PRN Cough  ondansetron Injectable 4 milliGRAM(s) IV Push every 6 hours PRN Nausea    Home Medications:  Artificial Tears ophthalmic solution: 1 drop(s) to each affected eye 4 times a day (20 Mar 2020 21:56)  baclofen 10 mg oral tablet: 1 tab(s) by gastrostomy tube 3 times a day (20 Mar 2020 21:56)  docusate sodium 60 mg/15 mL oral syrup: 100 milligram(s) by gastrostomy tube once a day, As Needed for constipation (20 Mar 2020 21:56)  guaifenesin-dextromethorphan 100 mg-10 mg/5 mL oral liquid: 5 milliliter(s) orally every 6 hours, As needed, Cough (27 Mar 2020 11:14)  Oysco 500 (1250 mg calcium carbonate) oral tablet: 1 tab(s) by gastrostomy tube 2 times a day (20 Mar 2020 21:56)  PHENobarbital 64.8 mg oral tablet: 1 tab(s) orally once a day via G tube (20 Mar 2020 21:56)  Ventolin HFA 90 mcg/inh inhalation aerosol: 2 puff(s) inhaled 4 times a day, As Needed (20 Mar 2020 21:56)    Vital Signs Last 24 Hrs  T(C): 36.6 (25 May 2020 05:11), Max: 37.1 (24 May 2020 21:27)  T(F): 97.9 (25 May 2020 05:11), Max: 98.8 (24 May 2020 21:27)  HR: 64 (25 May 2020 06:14) (62 - 71)  BP: 108/62 (25 May 2020 06:14) (85/49 - 108/62)  BP(mean): --  RR: 18 (25 May 2020 06:14) (17 - 18)  SpO2: --  CAPILLARY BLOOD GLUCOSE        LABS:                        11.6   4.38  )-----------( 191      ( 25 May 2020 07:10 )             34.2     05-25    138  |  101  |  13  ----------------------------<  123<H>  4.7   |  26  |  <0.5<L>    Ca    8.6      25 May 2020 07:10  Mg     1.9     05-25    TPro  6.1  /  Alb  2.9<L>  /  TBili  0.2  /  DBili  x   /  AST  41  /  ALT  36  /  AlkPhos  83  05-25    LIVER FUNCTIONS - ( 25 May 2020 07:10 )  Alb: 2.9 g/dL / Pro: 6.1 g/dL / ALK PHOS: 83 U/L / ALT: 36 U/L / AST: 41 U/L / GGT: x                           Consultant Notes Reviewed:  [x ] YES  [ ] NO  Care Discussed with Consultants/Other Providers/ Housestaff [ x] YES  [ ] NO  Radiology, labs, new studies personally reviewed.

## 2020-05-25 NOTE — PROGRESS NOTE ADULT - ASSESSMENT
· Assessment		  65 y/o male with PMH of cerebral palsy, seizure disorder, spastic paraplegia, s/p PEG tube, Asthma, H/O nephrolithiasis, BPH with bladder neck stricture s/p suprapubic catheter, and H/O Pseudomonas and ESBL Proteus mirabilis bacteruria who presented from a group with abdominal pain x1 days found to have R sided nephrolithiasis with hydronephrosis. Pt also has recent URI symptoms r/o COVID.     IMPRESSION;   Resolved sepsis secondary to acute Right pyelonephritis with right hydronephrosis with right ureteral stone  S/p R PCN by IVR 5/20  BCx 5/19 CoNS ( not a true pathogen)  BCx 5/20  P mirabilis ESBl  BCx 5/22 NG  UCx 5/19 P mirabilis ESBL  CT A/P - right hydronephrosis with a 9 x 7 x 10 mm proximal right ureteral calculus and ureteral enhancement concerning for infection   COVID-19     RECOMMENDATIONS;  planned for possible L PCNL on 5/25. Cleared from ID standpoint  Meropenem 1 gm iv q8h

## 2020-05-25 NOTE — CHART NOTE - NSCHARTNOTEFT_GEN_A_CORE
Registered Dietitian Follow-Up    ***Scroll to the bottom for RD recommendation***    Patient Profile Reviewed                           Yes [x]   No []  Nutrition History Previously Obtained        Yes []  No [x]          PERTINENT SUBJECTIVE INFORMATION:  - tried contacting [phone] provided again at 10:08am today and went to voicemail.  - per RN, pt is tolerating PEG feeding. However the tube feed is slightly not what RD recommended  - Pt is staring at the ceiling. See PMHX.          PERTINENT MEDICAL INFORMATIONS:  (1) Sepsis due to UTI - resolving. Swap done  (2) C/w IVF, Abx.  (3) LLL Opacity/ chronic Lt diaphragm elevation.  (4) Abd distention resolved. Normal KUB.   (5) PEG feed and bowel regimen.           DIET ORDER:   JEVITY 1.2 at 360ml q8hr + Prosource x1 = 1325 kcal/ 70g protein        ANTHROPOMETRICS:  - Ht. 157.48cm  - Wt.   (5/21): 58.6kg  (5/21): 59.1kg - likely stable for now.   - BMI.   - IBW.        PERTINENT LAB DATA:  5/25: h/h 11.6/34.2, Cr 0.5, Glucose 103  PERTINENT MEDS: enoxaparin, abx, LR, acetaminophen, stephanie carbonate, ondansetron, miralax, senna,       PHYSICAL FINDINGS  - APPEARANCE:        Cerebral Palsey, Extensive PMHX.   - GI FUNCTION:        LBM 5/13 loose per RN  - TUBES:                     R nephrostomy tube  - ORAL/MOUTH:      NPO  - SKIN:                        Intact        NUTRITION REQUIREMENTS  WEIGHT USED:                          ABW 59.1kg (Per Bedscale by JARAD Mercado 5/21). Ht: 157.48 (per 3/20/20)  ESTIMATED ENERGY NEEDS:       CONTINUE [  x]      ADJUST [  ]  - from 5/21    ESTIMATED ENERGY NEEDS:         2556-0104 kcal/day (MSJ x 1.2-1.3)  - this is compared with using cerebral palsy ht (cm) x 11kcal  ESTIMATED PROTEIN NEEDS:        55-66 g/day (1.0-1.2 g/kg of ABW) - this is compared with using cerebral palsy 15% protein from kcal  ESTIMATED FLUID NEEDS:             1ml/kcal      CURRENT NUTRIENT NEEDS:    1325 kcal/ 70g protein  = currently meeting low end kcal, high end protein          [x  ] PREVIOUS NUTRITION DIAGNOSIS:    (1) inadequate protein-energy intake  - technically resolved, will adjust the regimen slightly.            [  ] ONGOING        [  x] RESOLVED             PATIENT INTERVENTION:    [  ] ORAL        [x ] EN/TF     GOAL/EXPECTED OUTCOME:     pt to meet and tolerate >85% of estimated kcal/protein through LOS  INDICATOR/MONITORING:       RD to monitor diet order, energy intake, body composition, nutrition focused physical findings  (EN tolerance)  NUTRITION INTERVENTION:        Enteral nutrition      RECS: (1)  please adjust current formula to Jevity 1.2 at 300ml q6hr. This regimen gives a total of 1425 kcal/ 66g protein/ 972mL free water, additional flushes per LIP, or suggesting to have at least 60ml each or more if needed.

## 2020-05-25 NOTE — PROGRESS NOTE ADULT - ASSESSMENT
Pt is a 63yo Male admitted with sepsis, R hydronephrosis 2* to an obstructing proximal R ureteral stone s/p R PCNU placement by IR on 5/20. Pt is scheduled for R PCNL tomorrow.     ·	Cardiac clearance - intermediate risk  ·	Pulmonary clearance - moderate risk  ·	s/w Dr Garduno - cleared for procedure tomorrow  ·	COVD 19 negative (5/24)  ·	NPO after MN  ·	as per chart and previous IR consent, HCP is Leslie Chaparro ([facility]) [phone]  ·	has active T&S from 5/25  ·	Dr Cooper to obtain consent from HCP  ·	w/d with attng

## 2020-05-25 NOTE — PROGRESS NOTE ADULT - SUBJECTIVE AND OBJECTIVE BOX
HAWATISH  64y, Male    All available historical data reviewed    OVERNIGHT EVENTS:  no fevers  feels well and has no complaints     ROS:  General: Denies rigors, night sweats  HEENT: Denies headache, rhinorrhea, sore throat, eye pain  CV: Denies CP, palpitations  PULM: Denies wheezing, hemoptysis  GI: Denies hematemesis, hematochezia, melena  : Denies discharge, hematuria  MSK: Denies arthralgias, myalgias  SKIN: Denies rash, lesions  NEURO: Denies paresthesias, weakness  PSYCH: Denies depression, anxiety    VITALS:  T(F): 97.9, Max: 98.8 (05-24-20 @ 21:27)  HR: 64  BP: 108/62  RR: 18Vital Signs Last 24 Hrs  T(C): 36.6 (25 May 2020 05:11), Max: 37.1 (24 May 2020 21:27)  T(F): 97.9 (25 May 2020 05:11), Max: 98.8 (24 May 2020 21:27)  HR: 64 (25 May 2020 06:14) (62 - 71)  BP: 108/62 (25 May 2020 06:14) (85/49 - 108/62)  BP(mean): --  RR: 18 (25 May 2020 06:14) (17 - 18)  SpO2: --    TESTS & MEASUREMENTS:                        11.6   4.38  )-----------( 191      ( 25 May 2020 07:10 )             34.2     05-25    138  |  101  |  13  ----------------------------<  123<H>  4.7   |  26  |  <0.5<L>    Ca    8.6      25 May 2020 07:10  Mg     1.9     05-25    TPro  6.1  /  Alb  2.9<L>  /  TBili  0.2  /  DBili  x   /  AST  41  /  ALT  36  /  AlkPhos  83  05-25    LIVER FUNCTIONS - ( 25 May 2020 07:10 )  Alb: 2.9 g/dL / Pro: 6.1 g/dL / ALK PHOS: 83 U/L / ALT: 36 U/L / AST: 41 U/L / GGT: x             Culture - Blood (collected 05-22-20 @ 06:19)  Source: .Blood None  Preliminary Report (05-23-20 @ 12:01):    No growth to date.    Culture - Blood (collected 05-20-20 @ 16:20)  Source: .Blood Blood-Venous  Gram Stain (05-23-20 @ 08:44):    Growth in anaerobic bottle: Gram Negative Rods    Growth in aerobic bottle: Gram Negative Rods  Final Report (05-24-20 @ 14:39):    Growth in aerobic and anaerobic bottles: Proteus mirabilis ESBL    "Due to technical problems, Proteus sp. will Not be reported as part of    the BCID panel until further notice"    ***Blood Panel PCR results on this specimen are available    approximately3 hours after the Gram stain result.***    Gram stain, PCR, and/or culture results may not always    correspond due to difference in methodologies.    ************************************************************    This PCR assay was performed using [employer].    The following targets are tested for: Enterococcus,    vancomycin resistant enterococci, Listeria monocytogenes,    coagulase negative staphylococci, S. aureus,    methicillin resistant S. aureus, Streptococcus agalactiae    (Group B), S. pneumoniae, S. pyogenes (Group A),    Acinetobacter baumannii, Enterobacter cloacae, E. coli,    Klebsiella oxytoca, K. pneumoniae, Proteus sp.,    Serratia marcescens, Haemophilus influenzae,    Neisseria meningitidis, Pseudomonas aeruginosa, Candida    albicans, C. glabrata, C krusei, C parapsilosis,    C. tropicalis and the KPC resistance gene.  Organism: Blood Culture PCR  Proteus mirabilis ESBL (05-24-20 @ 14:39)  Organism: Proteus mirabilis ESBL (05-24-20 @ 14:39)      -  Amikacin: S <=16      -  Ampicillin: R >16 These ampicillin results predict results for amoxicillin      -  Ampicillin/Sulbactam: R 8/4 Enterobacter, Citrobacter, and Serratia may develop resistance during prolonged therapy (3-4 days)      -  Aztreonam: R 16      -  Cefazolin: R >16 Enterobacter, Citrobacter, and Serratia may develop resistance during prolonged therapy (3-4 days)      -  Cefepime: R >16      -  Cefoxitin: S <=8      -  Ceftriaxone: R >32 Enterobacter, Citrobacter, and Serratia may develop resistance during prolonged therapy      -  Ciprofloxacin: R >2      -  Ertapenem: S <=0.5      -  Gentamicin: S <=2      -  Levofloxacin: R >4      -  Meropenem: S <=1      -  Piperacillin/Tazobactam: R <=8      -  Tobramycin: S <=2      -  Trimethoprim/Sulfamethoxazole: R >2/38      Method Type: MIGUEL A  Organism: Blood Culture PCR (05-24-20 @ 14:39)      -  Acinetobacter baumanii: Nondet      -  Candida albicans: Nondet      -  Candida glabrata: Nondet      -  Candida krusei: Nondet      -  Candida parapsilosis: Nondet      -  Candida tropicalis: Nondet      -  Coagulase negative Staphylococcus: Nondet      -  Enterobacter cloacae complex: Nondet      -  Enterococcus species: Nondet      -  Escherichia coli: Nondet      -  Haemophilus influenzae: Nondet      -  Klebsiella oxytoca: Nondet      -  Klebsiella pneumoniae: Nondet      -  Listeria monocytogenes: Nondet      -  Methicillin resistant Staphylococcus aureus (MRSA): Nondet      -  Multidrug (KPC pos) resistant organism: Nondet      -  Neisseria meningitidis: Nondet      -  Pseudomonas aeruginosa: Nondet      -  Serratia marcescens: Nondet      -  Staphylococcus aureus: Nondet      -  Streptococcus agalactiae (Group B): Nondet      -  Streptococcus pneumoniae: Nondet      -  Streptococcus pyogenes (Group A): Nondet      -  Streptococcus sp. (Not Grp A, B or S pneumoniae): Nondet      -  Vancomycin resistant Enterococcus sp.: Nondet      Method Type: PCR    Culture - Urine (collected 05-20-20 @ 13:55)  Source: .Urine None  Final Report (05-23-20 @ 16:37):    Moderate Proteus mirabilis ESBL    Moderate Staphylococcus aureus  Organism: Proteus mirabilis ESBL  Staphylococcus aureus (05-23-20 @ 16:37)  Organism: Staphylococcus aureus (05-23-20 @ 16:37)      -  Ampicillin/Sulbactam: S <=8/4      -  Cefazolin: S <=4      -  Gentamicin: S <=1 Should not be used as monotherapy      -  Oxacillin: S 0.5      -  Penicillin: R >8      -  RIF- Rifampin: S <=1 Should not be used as monotherapy      -  Tetra/Doxy: S <=1      -  Trimethoprim/Sulfamethoxazole: S <=0.5/9.5      -  Vancomycin: S 2      Method Type: MIGUEL A  Organism: Proteus mirabilis ESBL (05-23-20 @ 16:37)      -  Amikacin: S <=16      -  Ampicillin: R >16 These ampicillin results predict results for amoxicillin      -  Ampicillin/Sulbactam: R <=8/4 Enterobacter, Citrobacter, and Serratia may develop resistance during prolonged therapy (3-4 days)      -  Aztreonam: R <=4      -  Cefazolin: R >16 (MIC_CL_COM_[email or web address]) For uncomplicated UTI with K. pneumoniae, E. coli, or P. mirablis: MIGUEL A <=16 is sensitive and MIGUEL A >=32 is resistant. This also predicts results for oral agents cefaclor, cefdinir, cefpodoxime, cefprozil, cefuroxime axetil, cephalexin and locarbef for uncomplicated UTI. Note that some isolates may be susceptible to these agents while testing resistant to cefazolin.      -  Cefepime: R 8      -  Cefoxitin: S <=8      -  Ceftriaxone: R 32 Enterobacter, Citrobacter, and Serratia may develop resistance during prolonged therapy      -  Ciprofloxacin: R >2      -  Ertapenem: S <=1      -  Gentamicin: S <=4      -  Levofloxacin: R >4      -  Meropenem: S <=1      -  Nitrofurantoin: R >64 Should not be used to treat pyelonephritis      -  Piperacillin/Tazobactam: R <=16      -  Tobramycin: S <=4      -  Trimethoprim/Sulfamethoxazole: R >2/38      Method Type: MIGUEL A    Culture - Blood (collected 05-19-20 @ 11:18)  Source: .Blood None  Gram Stain (05-20-20 @ 19:57):    Growth in anaerobic bottle: Gram Positive Cocci in Clusters  Final Report (05-21-20 @ 18:19):    Growth in anaerobic bottle: Staphylococcus epidermidis    Coag Negative Staphylococcus    Single set isolate, possible contaminant. Contact    Microbiology if susceptibility testing clinically    indicated.    ***Blood Panel PCR results on this specimen are available    approximately 3 hours after the Gram stain result.***    Gram stain, PCR, and/or culture results may not always    correspond due to difference in methodologies.    ************************************************************    This PCR assay was performed using [employer].    The following targets are tested for: Enterococcus,    vancomycin resistant enterococci, Listeria monocytogenes,    coagulase negative staphylococci, S. aureus,    methicillin resistant S. aureus, Streptococcus agalactiae    (Group B), S. pneumoniae, S. pyogenes (Group A),    Acinetobacter baumannii, Enterobacter cloacae, E. coli,    Klebsiella oxytoca, K. pneumoniae, Proteus sp.,    Serratia marcescens, Haemophilus influenzae,    Neisseria meningitidis, Pseudomonas aeruginosa,Candida    albicans, C. glabrata, C krusei, C parapsilosis,    C. tropicalis and the KPC resistance gene.    "Due to technical problems, Proteus sp. will Not be reported as part of    the BCID panel until further notice"  Organism: Blood Culture PCR  Staphylococcus epidermidis (05-21-20 @ 18:19)  Organism: Staphylococcus epidermidis (05-21-20 @ 18:19)      Method Type: MIGUEL A  Organism: Blood Culture PCR (05-21-20 @ 18:19)      -  Coagulase negative Staphylococcus: Detec      Method Type: PCR    Culture - Urine (collected 05-19-20 @ 03:25)  Source: .Urine Clean Catch (Midstream)  Final Report (05-21-20 @ 17:31):    >100,000 CFU/ml Proteus mirabilis ESBL  Organism: Proteus mirabilis ESBL (05-21-20 @ 17:31)  Organism: Proteus mirabilis ESBL (05-21-20 @ 17:31)      -  Amikacin: S <=16      -  Ampicillin: R >16 These ampicillin results predict results for amoxicillin      -  Ampicillin/Sulbactam: R <=8/4 Enterobacter, Citrobacter, and Serratia may develop resistance during prolonged therapy (3-4 days)      -  Aztreonam: R >16      -  Cefazolin: R >16 (MIC_CL_COM_[email or web address]) For uncomplicated UTI with K. pneumoniae, E. coli, or P. mirablis: MIGUEL A <=16 is sensitive and MIGUEL A >=32 is resistant. This also predicts results for oral agents cefaclor, cefdinir, cefpodoxime, cefprozil, cefuroxime axetil, cephalexin and locarbef for uncomplicated UTI. Note that some isolates may be susceptible to these agents while testing resistant to cefazolin.      -  Cefepime: R >16      -  Cefoxitin: S <=8      -  Ceftriaxone: R >32 Enterobacter, Citrobacter, and Serratia may develop resistance during prolonged therapy      -  Ciprofloxacin: R >2      -  Ertapenem: S <=1      -  Gentamicin: S <=4      -  Levofloxacin: R >4      -  Meropenem: S <=1      -  Nitrofurantoin: R >64 Should not be used to treat pyelonephritis      -  Piperacillin/Tazobactam: R <=16      -  Tobramycin: S <=4      -  Trimethoprim/Sulfamethoxazole: R >2/38      Method Type: MIGUEL A            RADIOLOGY & ADDITIONAL TESTS:  Personal review of radiological diagnostics performed  Echo and EKG results noted when applicable.     MEDICATIONS:  acetaminophen   Tablet .. 650 milliGRAM(s) Oral every 6 hours PRN  ALBUTerol    90 MICROgram(s) HFA Inhaler 2 Puff(s) Inhalation every 6 hours PRN  baclofen 10 milliGRAM(s) Oral three times a day  benzonatate 100 milliGRAM(s) Oral every 8 hours PRN  calcium carbonate   1250 mG (OsCal) 1 Tablet(s) Oral two times a day  dicyclomine 10 milliGRAM(s) Oral daily  enoxaparin Injectable 40 milliGRAM(s) SubCutaneous at bedtime  lactated ringers. 1000 milliLiter(s) IV Continuous <Continuous>  magnesium oxide 400 milliGRAM(s) Oral three times a day with meals  meropenem  IVPB 1000 milliGRAM(s) IV Intermittent every 8 hours  ondansetron Injectable 4 milliGRAM(s) IV Push every 6 hours PRN  PHENobarbital 64.8 milliGRAM(s) Oral daily  polyethylene glycol 3350 17 Gram(s) Oral every 12 hours  polyvinyl alcohol 1.4%/povidone 0.6% Ophthalmic Solution - Peds 1 Drop(s) Both EYES four times a day  senna 2 Tablet(s) Oral at bedtime  tamsulosin 0.4 milliGRAM(s) Oral at bedtime      ANTIBIOTICS:  meropenem  IVPB 1000 milliGRAM(s) IV Intermittent every 8 hours

## 2020-05-25 NOTE — PROGRESS NOTE ADULT - ASSESSMENT
Patient is a 65 y/o male with PMH of cerebral palsy, seizure disorder, spastic paraplegia, s/p PEG tube, Asthma, H/O nephrolithiasis, BPH with bladder neck stricture s/p suprapubic catheter, and H/O Pseudomonas and ESBL Proteus mirabilis bacteruria who presented from a group with abdominal pain x1 days.       #Sepsis due to UTI (resolving) 2/2 pyelonephritis with hydronephrosis and ureter stones with proteus ESBL bacteruria and bacteremia   - s/p R PCT nephrostomy with stent on 5/20  - needs Percutaneous nephrolithotomy (PCNL) for stent removal, scheduled on 5/26, COVID swap done on 5/24 for preop clearance is negative  - Pain control with Toradol PRN, monitor renal function   - IVF, Tamsulosin and Bentyl to facilitate passage   - c/w Meropenem (proteus ESBL bacteruria and bacteremia)  - f/u repeat Bcx   -pulm and cardio clearance on chart (mod-high risk for periop complications)    #LLL opacity/Chronic Lt diaphragm elevation  -a-Sx  -on RA    #lactic acidosis: (reolved) possibly seizure after nephrostomy  with subtherapeutic phenobarb level  - phenobarb level is low, REEG  no seizure, just slowing, as per Neuro (spoke with Dr. Shipley), no dose adjustment needed,   - lactate level normalized    #normocytic anemia: Hbg stable, likely dilutional, no signs of blood loss, monitor Hbg and keep active type and screening     #Abd distention: resolved   - PEG tube feeding  - bowel regimen     # URI symptoms w/ COVID contact in last 2 weeks -COVID PCR negative on 5/19, has lymphopenia looks chronic     # Seizure disorder - Continue with phenobarbital 64.8mg PO through PEG tube QD (current level is ok as per neuro)    # Cerebral palsy w/ Spastic quadriplegia - Continue with baclofen 10mg PO three times a day, c/w PEG feeds     # Functional quadriplegia - full care    # Asthma - No wheezing on exam, on RA, Albuterol PRN     # BPH with bladder neck stricture s/p suprapubic catheter - Tube replaced day before admission, Chronic colonization w/ ESBL Proteus Mirabilis, grew Pseudomonas on last admission,    #DVT Prophylaxis: Lovenox 40mg SQ once daily,     #GI Prophylaxis: Not indicated     #Diet: through PEG tube feeding. NPO     Activity: bed rest     Dispo: [facility], , Leslie ([phone]) is the sister and health care proxy, Mitch is the nurse at [facility] [phone],   Code Status: Full Code    #Progress Note Handoff  Pending (specify): ID f/u_Clinical improvement and stability__x__Tests__ Cx, PCNL___, PT________  Pt/Family discussion: Pt informed and agrees with the current plan  Disposition: Home__x____SNF_______To be determined________ .     35 minutes spent on total encounter; more than 50% of the visit was spent counseling and/or coordinating care by the attending physician.

## 2020-05-26 ENCOUNTER — APPOINTMENT (OUTPATIENT)
Dept: UROLOGY | Facility: HOSPITAL | Age: 65
End: 2020-05-26

## 2020-05-26 ENCOUNTER — RESULT REVIEW (OUTPATIENT)
Age: 65
End: 2020-05-26

## 2020-05-26 LAB
ALBUMIN SERPL ELPH-MCNC: 3.2 G/DL — LOW (ref 3.5–5.2)
ALP SERPL-CCNC: 80 U/L — SIGNIFICANT CHANGE UP (ref 30–115)
ALT FLD-CCNC: 46 U/L — HIGH (ref 0–41)
ANION GAP SERPL CALC-SCNC: 10 MMOL/L — SIGNIFICANT CHANGE UP (ref 7–14)
APTT BLD: 36.6 SEC — SIGNIFICANT CHANGE UP (ref 27–39.2)
AST SERPL-CCNC: 50 U/L — HIGH (ref 0–41)
BASOPHILS # BLD AUTO: 0.02 K/UL — SIGNIFICANT CHANGE UP (ref 0–0.2)
BASOPHILS NFR BLD AUTO: 0.4 % — SIGNIFICANT CHANGE UP (ref 0–1)
BILIRUB SERPL-MCNC: 0.2 MG/DL — SIGNIFICANT CHANGE UP (ref 0.2–1.2)
BUN SERPL-MCNC: 11 MG/DL — SIGNIFICANT CHANGE UP (ref 10–20)
CALCIUM SERPL-MCNC: 8.6 MG/DL — SIGNIFICANT CHANGE UP (ref 8.5–10.1)
CHLORIDE SERPL-SCNC: 101 MMOL/L — SIGNIFICANT CHANGE UP (ref 98–110)
CO2 SERPL-SCNC: 28 MMOL/L — SIGNIFICANT CHANGE UP (ref 17–32)
CREAT SERPL-MCNC: <0.5 MG/DL — LOW (ref 0.7–1.5)
EOSINOPHIL # BLD AUTO: 0.31 K/UL — SIGNIFICANT CHANGE UP (ref 0–0.7)
EOSINOPHIL NFR BLD AUTO: 6.3 % — SIGNIFICANT CHANGE UP (ref 0–8)
GLUCOSE SERPL-MCNC: 88 MG/DL — SIGNIFICANT CHANGE UP (ref 70–99)
HCT VFR BLD CALC: 34.7 % — LOW (ref 42–52)
HGB BLD-MCNC: 11.4 G/DL — LOW (ref 14–18)
IMM GRANULOCYTES NFR BLD AUTO: 0.8 % — HIGH (ref 0.1–0.3)
INR BLD: 1.06 RATIO — SIGNIFICANT CHANGE UP (ref 0.65–1.3)
LYMPHOCYTES # BLD AUTO: 1.56 K/UL — SIGNIFICANT CHANGE UP (ref 1.2–3.4)
LYMPHOCYTES # BLD AUTO: 31.5 % — SIGNIFICANT CHANGE UP (ref 20.5–51.1)
MAGNESIUM SERPL-MCNC: 2.1 MG/DL — SIGNIFICANT CHANGE UP (ref 1.8–2.4)
MCHC RBC-ENTMCNC: 31.2 PG — HIGH (ref 27–31)
MCHC RBC-ENTMCNC: 32.9 G/DL — SIGNIFICANT CHANGE UP (ref 32–37)
MCV RBC AUTO: 95.1 FL — HIGH (ref 80–94)
MONOCYTES # BLD AUTO: 0.53 K/UL — SIGNIFICANT CHANGE UP (ref 0.1–0.6)
MONOCYTES NFR BLD AUTO: 10.7 % — HIGH (ref 1.7–9.3)
NEUTROPHILS # BLD AUTO: 2.5 K/UL — SIGNIFICANT CHANGE UP (ref 1.4–6.5)
NEUTROPHILS NFR BLD AUTO: 50.3 % — SIGNIFICANT CHANGE UP (ref 42.2–75.2)
NRBC # BLD: 0 /100 WBCS — SIGNIFICANT CHANGE UP (ref 0–0)
PLATELET # BLD AUTO: 208 K/UL — SIGNIFICANT CHANGE UP (ref 130–400)
POTASSIUM SERPL-MCNC: 4.9 MMOL/L — SIGNIFICANT CHANGE UP (ref 3.5–5)
POTASSIUM SERPL-SCNC: 4.9 MMOL/L — SIGNIFICANT CHANGE UP (ref 3.5–5)
PROT SERPL-MCNC: 6.2 G/DL — SIGNIFICANT CHANGE UP (ref 6–8)
PROTHROM AB SERPL-ACNC: 12.2 SEC — SIGNIFICANT CHANGE UP (ref 9.95–12.87)
RBC # BLD: 3.65 M/UL — LOW (ref 4.7–6.1)
RBC # FLD: 14.1 % — SIGNIFICANT CHANGE UP (ref 11.5–14.5)
SODIUM SERPL-SCNC: 139 MMOL/L — SIGNIFICANT CHANGE UP (ref 135–146)
WBC # BLD: 4.96 K/UL — SIGNIFICANT CHANGE UP (ref 4.8–10.8)
WBC # FLD AUTO: 4.96 K/UL — SIGNIFICANT CHANGE UP (ref 4.8–10.8)

## 2020-05-26 PROCEDURE — 50387 CHANGE NEPHROURETERAL CATH: CPT | Mod: RT

## 2020-05-26 PROCEDURE — 99233 SBSQ HOSP IP/OBS HIGH 50: CPT

## 2020-05-26 PROCEDURE — 74425 UROGRAPHY ANTEGRADE RS&I: CPT | Mod: 26,RT

## 2020-05-26 PROCEDURE — 50951 ENDOSCOPY OF URETER: CPT | Mod: RT

## 2020-05-26 PROCEDURE — 88300 SURGICAL PATH GROSS: CPT | Mod: 26

## 2020-05-26 PROCEDURE — 50081 PERQ NL/PL LITHOTRP CPLX>2CM: CPT | Mod: RT

## 2020-05-26 RX ORDER — MEROPENEM 1 G/30ML
1000 INJECTION INTRAVENOUS ONCE
Refills: 0 | Status: COMPLETED | OUTPATIENT
Start: 2020-05-26 | End: 2020-05-26

## 2020-05-26 RX ORDER — MORPHINE SULFATE 50 MG/1
4 CAPSULE, EXTENDED RELEASE ORAL
Refills: 0 | Status: DISCONTINUED | OUTPATIENT
Start: 2020-05-26 | End: 2020-05-26

## 2020-05-26 RX ORDER — BACLOFEN 100 %
10 POWDER (GRAM) MISCELLANEOUS THREE TIMES A DAY
Refills: 0 | Status: DISCONTINUED | OUTPATIENT
Start: 2020-05-26 | End: 2020-06-10

## 2020-05-26 RX ORDER — MEROPENEM 1 G/30ML
1000 INJECTION INTRAVENOUS EVERY 8 HOURS
Refills: 0 | Status: COMPLETED | OUTPATIENT
Start: 2020-05-26 | End: 2020-06-05

## 2020-05-26 RX ORDER — ALBUTEROL 90 UG/1
2 AEROSOL, METERED ORAL EVERY 6 HOURS
Refills: 0 | Status: DISCONTINUED | OUTPATIENT
Start: 2020-05-26 | End: 2020-06-10

## 2020-05-26 RX ORDER — PHENOBARBITAL 60 MG
64.8 TABLET ORAL DAILY
Refills: 0 | Status: DISCONTINUED | OUTPATIENT
Start: 2020-05-26 | End: 2020-06-10

## 2020-05-26 RX ORDER — MAGNESIUM OXIDE 400 MG ORAL TABLET 241.3 MG
400 TABLET ORAL
Refills: 0 | Status: DISCONTINUED | OUTPATIENT
Start: 2020-05-26 | End: 2020-05-31

## 2020-05-26 RX ORDER — ONDANSETRON 8 MG/1
4 TABLET, FILM COATED ORAL EVERY 6 HOURS
Refills: 0 | Status: DISCONTINUED | OUTPATIENT
Start: 2020-05-26 | End: 2020-06-10

## 2020-05-26 RX ORDER — SENNA PLUS 8.6 MG/1
2 TABLET ORAL AT BEDTIME
Refills: 0 | Status: DISCONTINUED | OUTPATIENT
Start: 2020-05-26 | End: 2020-06-10

## 2020-05-26 RX ORDER — CALCIUM CARBONATE 500(1250)
1 TABLET ORAL
Refills: 0 | Status: DISCONTINUED | OUTPATIENT
Start: 2020-05-26 | End: 2020-06-10

## 2020-05-26 RX ORDER — MEROPENEM 1 G/30ML
INJECTION INTRAVENOUS
Refills: 0 | Status: COMPLETED | OUTPATIENT
Start: 2020-05-26 | End: 2020-06-05

## 2020-05-26 RX ORDER — CALCIUM CARBONATE 500(1250)
1 TABLET ORAL DAILY
Refills: 0 | Status: DISCONTINUED | OUTPATIENT
Start: 2020-05-26 | End: 2020-06-10

## 2020-05-26 RX ORDER — TAMSULOSIN HYDROCHLORIDE 0.4 MG/1
0.4 CAPSULE ORAL AT BEDTIME
Refills: 0 | Status: DISCONTINUED | OUTPATIENT
Start: 2020-05-26 | End: 2020-06-10

## 2020-05-26 RX ORDER — SODIUM CHLORIDE 9 MG/ML
1000 INJECTION INTRAMUSCULAR; INTRAVENOUS; SUBCUTANEOUS
Refills: 0 | Status: DISCONTINUED | OUTPATIENT
Start: 2020-05-26 | End: 2020-05-27

## 2020-05-26 RX ORDER — SODIUM CHLORIDE 9 MG/ML
1000 INJECTION, SOLUTION INTRAVENOUS
Refills: 0 | Status: DISCONTINUED | OUTPATIENT
Start: 2020-05-26 | End: 2020-05-26

## 2020-05-26 RX ORDER — POLYETHYLENE GLYCOL 3350 17 G/17G
17 POWDER, FOR SOLUTION ORAL EVERY 12 HOURS
Refills: 0 | Status: DISCONTINUED | OUTPATIENT
Start: 2020-05-26 | End: 2020-06-10

## 2020-05-26 RX ORDER — ACETAMINOPHEN 500 MG
650 TABLET ORAL EVERY 6 HOURS
Refills: 0 | Status: DISCONTINUED | OUTPATIENT
Start: 2020-05-26 | End: 2020-06-10

## 2020-05-26 RX ADMIN — MEROPENEM 100 MILLIGRAM(S): 1 INJECTION INTRAVENOUS at 21:57

## 2020-05-26 RX ADMIN — TAMSULOSIN HYDROCHLORIDE 0.4 MILLIGRAM(S): 0.4 CAPSULE ORAL at 21:57

## 2020-05-26 RX ADMIN — Medication 64.8 MILLIGRAM(S): at 18:57

## 2020-05-26 RX ADMIN — SENNA PLUS 2 TABLET(S): 8.6 TABLET ORAL at 21:57

## 2020-05-26 RX ADMIN — MAGNESIUM OXIDE 400 MG ORAL TABLET 400 MILLIGRAM(S): 241.3 TABLET ORAL at 18:56

## 2020-05-26 RX ADMIN — POLYETHYLENE GLYCOL 3350 17 GRAM(S): 17 POWDER, FOR SOLUTION ORAL at 18:57

## 2020-05-26 RX ADMIN — Medication 1 DROP(S): at 05:10

## 2020-05-26 RX ADMIN — MEROPENEM 100 MILLIGRAM(S): 1 INJECTION INTRAVENOUS at 11:31

## 2020-05-26 RX ADMIN — MEROPENEM 100 MILLIGRAM(S): 1 INJECTION INTRAVENOUS at 16:32

## 2020-05-26 RX ADMIN — MEROPENEM 100 MILLIGRAM(S): 1 INJECTION INTRAVENOUS at 05:09

## 2020-05-26 RX ADMIN — Medication 10 MILLIGRAM(S): at 21:57

## 2020-05-26 RX ADMIN — Medication 1 TABLET(S): at 21:57

## 2020-05-26 RX ADMIN — Medication 10 MILLIGRAM(S): at 18:56

## 2020-05-26 RX ADMIN — Medication 1 TABLET(S): at 18:56

## 2020-05-26 NOTE — BRIEF OPERATIVE NOTE - OPERATION/FINDINGS
4 stones removed the upper ureteral stone was popped into the pelvis and removed with the grasper, antegrade ureteroscopy until just before the intramural ureter showed no residual stones. I could not get into the other calces due to the angle of the sheath with the pelvis and if I backed it out it started to bleed

## 2020-05-26 NOTE — BRIEF OPERATIVE NOTE - NSICDXBRIEFPROCEDURE_GEN_ALL_CORE_FT
PROCEDURES:  Nephrostogram 26-May-2020 15:34:10 right Bonnie Cooper  Replacement of external ureteral stent 26-May-2020 15:34:02 right Bonnie Cooper  Antegrade ureteroscopy 26-May-2020 15:33:44 right Bonnie Cooper  Percutaneous removal of calculus of kidney, 2 cm or more in diameter 26-May-2020 15:32:44  Bonnie Cooper

## 2020-05-26 NOTE — PROGRESS NOTE ADULT - SUBJECTIVE AND OBJECTIVE BOX
HAWATISH SCHUSTER  64y, Male    All available historical data reviewed    OVERNIGHT EVENTS:  no fevers  feels well and has no complaints     ROS:  unable to obtain history secondary to patient's mental status and/or sedation     VITALS:  T(F): 97.1, Max: 97.9 (05-25-20 @ 21:01)  HR: 82  BP: 138/72  RR: 18Vital Signs Last 24 Hrs  T(C): 36.2 (26 May 2020 06:40), Max: 36.6 (25 May 2020 21:01)  T(F): 97.1 (26 May 2020 06:40), Max: 97.9 (25 May 2020 21:01)  HR: 82 (26 May 2020 06:40) (67 - 82)  BP: 138/72 (26 May 2020 06:40) (101/50 - 138/72)  BP(mean): --  RR: 18 (26 May 2020 06:40) (16 - 18)  SpO2: --    TESTS & MEASUREMENTS:                        11.4   4.96  )-----------( 208      ( 26 May 2020 05:56 )             34.7     05-26    139  |  101  |  11  ----------------------------<  88  4.9   |  28  |  <0.5<L>    Ca    8.6      26 May 2020 05:56  Mg     2.1     05-26    TPro  6.2  /  Alb  3.2<L>  /  TBili  0.2  /  DBili  x   /  AST  50<H>  /  ALT  46<H>  /  AlkPhos  80  05-26    LIVER FUNCTIONS - ( 26 May 2020 05:56 )  Alb: 3.2 g/dL / Pro: 6.2 g/dL / ALK PHOS: 80 U/L / ALT: 46 U/L / AST: 50 U/L / GGT: x             Culture - Blood (collected 05-25-20 @ 07:10)  Source: .Blood None  Preliminary Report (05-26-20 @ 12:01):    No growth to date.    Culture - Blood (collected 05-22-20 @ 06:19)  Source: .Blood None  Preliminary Report (05-23-20 @ 12:01):    No growth to date.    Culture - Blood (collected 05-20-20 @ 16:20)  Source: .Blood Blood-Venous  Gram Stain (05-23-20 @ 08:44):    Growth in anaerobic bottle: Gram Negative Rods    Growth in aerobic bottle: Gram Negative Rods  Final Report (05-24-20 @ 14:39):    Growth in aerobic and anaerobic bottles: Proteus mirabilis ESBL    "Due to technical problems, Proteus sp. will Not be reported as part of    the BCID panel until further notice"    ***Blood Panel PCR results on this specimen are available    approximately3 hours after the Gram stain result.***    Gram stain, PCR, and/or culture results may not always    correspond due to difference in methodologies.    ************************************************************    This PCR assay was performed using [employer].    The following targets are tested for: Enterococcus,    vancomycin resistant enterococci, Listeria monocytogenes,    coagulase negative staphylococci, S. aureus,    methicillin resistant S. aureus, Streptococcus agalactiae    (Group B), S. pneumoniae, S. pyogenes (Group A),    Acinetobacter baumannii, Enterobacter cloacae, E. coli,    Klebsiella oxytoca, K. pneumoniae, Proteus sp.,    Serratia marcescens, Haemophilus influenzae,    Neisseria meningitidis, Pseudomonas aeruginosa, Candida    albicans, C. glabrata, C krusei, C parapsilosis,    C. tropicalis and the KPC resistance gene.  Organism: Blood Culture PCR  Proteus mirabilis ESBL (05-24-20 @ 14:39)  Organism: Proteus mirabilis ESBL (05-24-20 @ 14:39)      -  Amikacin: S <=16      -  Ampicillin: R >16 These ampicillin results predict results for amoxicillin      -  Ampicillin/Sulbactam: R 8/4 Enterobacter, Citrobacter, and Serratia may develop resistance during prolonged therapy (3-4 days)      -  Aztreonam: R 16      -  Cefazolin: R >16 Enterobacter, Citrobacter, and Serratia may develop resistance during prolonged therapy (3-4 days)      -  Cefepime: R >16      -  Cefoxitin: S <=8      -  Ceftriaxone: R >32 Enterobacter, Citrobacter, and Serratia may develop resistance during prolonged therapy      -  Ciprofloxacin: R >2      -  Ertapenem: S <=0.5      -  Gentamicin: S <=2      -  Levofloxacin: R >4      -  Meropenem: S <=1      -  Piperacillin/Tazobactam: R <=8      -  Tobramycin: S <=2      -  Trimethoprim/Sulfamethoxazole: R >2/38      Method Type: MIGUEL A  Organism: Blood Culture PCR (05-24-20 @ 14:39)      -  Acinetobacter baumanii: Nondet      -  Candida albicans: Nondet      -  Candida glabrata: Nondet      -  Candida krusei: Nondet      -  Candida parapsilosis: Nondet      -  Candida tropicalis: Nondet      -  Coagulase negative Staphylococcus: Nondet      -  Enterobacter cloacae complex: Nondet      -  Enterococcus species: Nondet      -  Escherichia coli: Nondet      -  Haemophilus influenzae: Nondet      -  Klebsiella oxytoca: Nondet      -  Klebsiella pneumoniae: Nondet      -  Listeria monocytogenes: Nondet      -  Methicillin resistant Staphylococcus aureus (MRSA): Nondet      -  Multidrug (KPC pos) resistant organism: Nondet      -  Neisseria meningitidis: Nondet      -  Pseudomonas aeruginosa: Nondet      -  Serratia marcescens: Nondet      -  Staphylococcus aureus: Nondet      -  Streptococcus agalactiae (Group B): Nondet      -  Streptococcus pneumoniae: Nondet      -  Streptococcus pyogenes (Group A): Nondet      -  Streptococcus sp. (Not Grp A, B or S pneumoniae): Nondet      -  Vancomycin resistant Enterococcus sp.: Nondet      Method Type: PCR    Culture - Urine (collected 05-20-20 @ 13:55)  Source: .Urine None  Final Report (05-23-20 @ 16:37):    Moderate Proteus mirabilis ESBL    Moderate Staphylococcus aureus  Organism: Proteus mirabilis ESBL  Staphylococcus aureus (05-23-20 @ 16:37)  Organism: Staphylococcus aureus (05-23-20 @ 16:37)      -  Ampicillin/Sulbactam: S <=8/4      -  Cefazolin: S <=4      -  Gentamicin: S <=1 Should not be used as monotherapy      -  Oxacillin: S 0.5      -  Penicillin: R >8      -  RIF- Rifampin: S <=1 Should not be used as monotherapy      -  Tetra/Doxy: S <=1      -  Trimethoprim/Sulfamethoxazole: S <=0.5/9.5      -  Vancomycin: S 2      Method Type: MIGUEL A  Organism: Proteus mirabilis ESBL (05-23-20 @ 16:37)      -  Amikacin: S <=16      -  Ampicillin: R >16 These ampicillin results predict results for amoxicillin      -  Ampicillin/Sulbactam: R <=8/4 Enterobacter, Citrobacter, and Serratia may develop resistance during prolonged therapy (3-4 days)      -  Aztreonam: R <=4      -  Cefazolin: R >16 (MIC_CL_COM_ENTERIC_CEFAZU) For uncomplicated UTI with K. pneumoniae, E. coli, or P. mirablis: MIGUEL A <=16 is sensitive and MIGUEL A >=32 is resistant. This also predicts results for oral agents cefaclor, cefdinir, cefpodoxime, cefprozil, cefuroxime axetil, cephalexin and locarbef for uncomplicated UTI. Note that some isolates may be susceptible to these agents while testing resistant to cefazolin.      -  Cefepime: R 8      -  Cefoxitin: S <=8      -  Ceftriaxone: R 32 Enterobacter, Citrobacter, and Serratia may develop resistance during prolonged therapy      -  Ciprofloxacin: R >2      -  Ertapenem: S <=1      -  Gentamicin: S <=4      -  Levofloxacin: R >4      -  Meropenem: S <=1      -  Nitrofurantoin: R >64 Should not be used to treat pyelonephritis      -  Piperacillin/Tazobactam: R <=16      -  Tobramycin: S <=4      -  Trimethoprim/Sulfamethoxazole: R >2/38      Method Type: MIGUEL A            RADIOLOGY & ADDITIONAL TESTS:  Personal review of radiological diagnostics performed  Echo and EKG results noted when applicable.     MEDICATIONS:  acetaminophen   Tablet .. 650 milliGRAM(s) Oral every 6 hours PRN  ALBUTerol    90 MICROgram(s) HFA Inhaler 2 Puff(s) Inhalation every 6 hours PRN  baclofen 10 milliGRAM(s) Oral three times a day  benzonatate 100 milliGRAM(s) Oral every 8 hours PRN  calcium carbonate   1250 mG (OsCal) 1 Tablet(s) Oral two times a day  dicyclomine 10 milliGRAM(s) Oral daily  enoxaparin Injectable 40 milliGRAM(s) SubCutaneous at bedtime  lactated ringers. 1000 milliLiter(s) IV Continuous <Continuous>  magnesium oxide 400 milliGRAM(s) Oral three times a day with meals  meropenem  IVPB 1000 milliGRAM(s) IV Intermittent every 8 hours  ondansetron Injectable 4 milliGRAM(s) IV Push every 6 hours PRN  PHENobarbital 64.8 milliGRAM(s) Oral daily  polyethylene glycol 3350 17 Gram(s) Oral every 12 hours  polyvinyl alcohol 1.4%/povidone 0.6% Ophthalmic Solution - Peds 1 Drop(s) Both EYES four times a day  senna 2 Tablet(s) Oral at bedtime  tamsulosin 0.4 milliGRAM(s) Oral at bedtime      ANTIBIOTICS:  meropenem  IVPB 1000 milliGRAM(s) IV Intermittent every 8 hours

## 2020-05-26 NOTE — PROGRESS NOTE ADULT - ASSESSMENT
· Assessment		  65 y/o male with PMH of cerebral palsy, seizure disorder, spastic paraplegia, s/p PEG tube, Asthma, H/O nephrolithiasis, BPH with bladder neck stricture s/p suprapubic catheter, and H/O Pseudomonas and ESBL Proteus mirabilis bacteruria who presented from a group with abdominal pain x1 days found to have R sided nephrolithiasis with hydronephrosis. Pt also has recent URI symptoms r/o COVID.     IMPRESSION;   Resolved sepsis secondary to acute Right pyelonephritis with right hydronephrosis with right ureteral stone  S/p R PCN by IVR 5/20  BCx 5/19 CoNS ( not a true pathogen)  BCx 5/20  P mirabilis ESBl  BCx 5/22 NG  UCx 5/19 P mirabilis ESBL  CT A/P - right hydronephrosis with a 9 x 7 x 10 mm proximal right ureteral calculus and ureteral enhancement concerning for infection   COVID-19     RECOMMENDATIONS;  planned for possible L PCNL . Cleared from ID standpoint  Meropenem 1 gm iv q8h   midline  home on ertapenem 1 gm iv q24h for 14 days starting 5/26 · Assessment		  65 y/o male with PMH of cerebral palsy, seizure disorder, spastic paraplegia, s/p PEG tube, Asthma, H/O nephrolithiasis, BPH with bladder neck stricture s/p suprapubic catheter, and H/O Pseudomonas and ESBL Proteus mirabilis bacteruria who presented from a group with abdominal pain x1 days found to have R sided nephrolithiasis with hydronephrosis. Pt also has recent URI symptoms r/o COVID.     IMPRESSION;   Resolved sepsis secondary to acute Right pyelonephritis with right hydronephrosis with right ureteral stone  S/p R PCN by IVR 5/20  BCx 5/19 CoNS ( not a true pathogen)  BCx 5/20  P mirabilis ESBl  BCx 5/22 NG  UCx 5/19 P mirabilis ESBL  CT A/P - right hydronephrosis with a 9 x 7 x 10 mm proximal right ureteral calculus and ureteral enhancement concerning for infection   COVID-19     RECOMMENDATIONS;  planned for possible R PCNL . Cleared from ID standpoint  Meropenem 1 gm iv q8h   midline  home on ertapenem 1 gm iv q24h for 14 days starting 5/26

## 2020-05-26 NOTE — CHART NOTE - NSCHARTNOTEFT_GEN_A_CORE
PACU ANESTHESIA ADMISSION NOTE      Procedure: Nephrostogram: right  Replacement of external ureteral stent: right  Antegrade ureteroscopy: right  Percutaneous removal of calculus of kidney, 2 cm or more in diameter    Post op diagnosis:  Right renal stone  Hydronephrosis with ureteral calculus      ____  Intubated  TV:______       Rate: ______      FiO2: ______    _x___  Patent Airway    _x___  Full return of protective reflexes    _x___  Full recovery from anesthesia / back to baseline status    Mental Status:  _x___ Awake   _____ Alert   _____ Drowsy   _____ Sedated    Nausea/Vomiting:  _x___  NO       ______Yes,   See Post - Op Orders         Pain Scale (0-10):  __0___    Treatment: _x___ None    ____ See Post - Op/PCA Orders    Post - Operative Fluids:   x___ See Post - Op Orders    Plan: Discharge:   x___Floor     _____Critical Care    _____  Other:_________________    Comments:  No anesthesia issues or complications noted.  Discharge when criteria met.

## 2020-05-26 NOTE — PROGRESS NOTE ADULT - ASSESSMENT
#urosepsis (resolving) 2/2 pyelonephritis with hydronephrosis and ureter stones with proteus ESBL bacteruria and bacteremia   - s/p R PCT nephrostomy with stent on 5/20, going for Percutaneous nephrolithotomy (PCNL) today  - midline and meropenum (ertapenum)  for 14 day course of abx     #lactic acidosis: (reolved) possibly seizure after nephrostomy  with subtherapeutic phenobarb level  - phenobarb level is low, REEG  no seizure, just slowing, - lactate level normalized    #Anemia no indication for transfusion     # URI symptoms w/ COVID contact in last 2 weeks -COVID PCR negative on 5/19, has lymphopenia looks chronic     # chronic Cough is unlikely from [place] but non COVID bronchitis     # Seizure disorder - phenobarbital via peg     # Cerebral palsy w/ Spastic quadriplegia - Continue with baclofen 10mg PO three times a day, c/w PEG feeds     # Functional quadriplegia - full care    # Asthma - No wheezing on exam, on RA, Albuterol PRN     # BPH with bladder neck stricture s/p suprapubic catheter - Tube replaced day before admission, Chronic colonization w/ ESBL Proteus Mirabilis, grew Pseudomonas on last admission,    Progress Note Handoff    Pending: lithotripsy     Family discussion: Leslie ([phone]) contacted at 12:18 pm and understands the plan of care including lithotripsy     Disposition: group home

## 2020-05-26 NOTE — PROGRESS NOTE ADULT - SUBJECTIVE AND OBJECTIVE BOX
POST OP CHECK    63 y/o M a/w sepsis found to have R hydronephrosis 2/2 obstructing proximal R ureteral calculi s/p R PCNL POD#0. Pt seen and examined at bedside. Pt has no acute complaints at this time. Pt denies nausea, vomiting, fevers, chills, chest pain, SOB, abdominal pain, & flank pain.       [ x ] a 10 point review of systems was negative except where noted    [  ]  Due to altered mental status/intubation, subjective information was not able t be obtained from the patient.  History was obtained, to the extent possible, from review of the chart and collateral sources of information.    MEDICATIONS  (STANDING):  baclofen 10 milliGRAM(s) Oral three times a day  calcium carbonate   1250 mG (OsCal) 1 Tablet(s) Oral two times a day  calcium carbonate   1250 mG (OsCal) 1 Tablet(s) Oral daily  dicyclomine 10 milliGRAM(s) Oral daily  magnesium oxide 400 milliGRAM(s) Oral three times a day with meals  meropenem  IVPB      meropenem  IVPB 1000 milliGRAM(s) IV Intermittent every 8 hours  PHENobarbital 64.8 milliGRAM(s) Oral daily  polyethylene glycol 3350 17 Gram(s) Oral every 12 hours  senna 2 Tablet(s) Oral at bedtime  sodium chloride 0.9%. 1000 milliLiter(s) (125 mL/Hr) IV Continuous <Continuous>  tamsulosin 0.4 milliGRAM(s) Oral at bedtime    MEDICATIONS  (PRN):  acetaminophen   Tablet .. 650 milliGRAM(s) Oral every 6 hours PRN Temp greater or equal to 38C (100.4F)  ALBUTerol    90 MICROgram(s) HFA Inhaler 2 Puff(s) Inhalation every 6 hours PRN Shortness of Breath and/or Wheezing  benzonatate 100 milliGRAM(s) Oral every 8 hours PRN Cough  ondansetron Injectable 4 milliGRAM(s) IV Push every 6 hours PRN Nausea    Vital signs  T(C): , Max: 36.7 (05-26-20 @ 17:00)  HR: 90 (05-26-20 @ 17:00)  BP: 100/55 (05-26-20 @ 17:00)  SpO2: 96% (05-26-20 @ 17:00)    Constitutional: NAD, well-developed  HEENT: NCAT  Neck: no pain  Respiratory: No accessory respiratory muscle use  Abd: Soft, NT/ND. No flank tenderness. No suprapubic tenderness. + SPT draining blood tinged urine.   : + R nephrostomy in place draining serosanguinous fluid   Extremities: no edema, paraplegic  Skin: No obvious rashes    I&O's Detail    25 May 2020 07:01  -  26 May 2020 07:00  --------------------------------------------------------  IN:    Enteral Tube Flush: 200 mL    lactated ringers.: 975 mL    ns in tub fed  [id number]: 600 mL  Total IN: 1775 mL    OUT:    Indwelling Catheter - Suprapubic: 1195 mL    Nephrostomy Tube: 1240 mL  Total OUT: 2435 mL    Total NET: -660 mL      26 May 2020 07:01  -  26 May 2020 18:44  --------------------------------------------------------  IN:  Total IN: 0 mL    OUT:    Nephrostomy Tube: 220 mL  Total OUT: 220 mL    Total NET: -220 mL          Labs                        11.4   4.96  )-----------( 208      ( 26 May 2020 05:56 )             34.7     26 May 2020 05:56    139    |  101    |  11     ----------------------------<  88     4.9     |  28     |  <0.5     Ca    8.6        26 May 2020 05:56  Mg     2.1       26 May 2020 05:56 POST OP CHECK    65 y/o M a/w sepsis found to have R hydronephrosis 2/2 obstructing proximal R ureteral calculi s/p right PCN and then today R PCNL POD#0. Pt seen and examined at bedside. Pt has no acute complaints at this time. Pt denies nausea, vomiting, fevers, chills, chest pain, SOB, abdominal pain, & flank pain.       [ x ] a 10 point review of systems was negative except where noted. Note pt with CO but mentally fully aware    MEDICATIONS  (STANDING):  baclofen 10 milliGRAM(s) Oral three times a day  calcium carbonate   1250 mG (OsCal) 1 Tablet(s) Oral two times a day  calcium carbonate   1250 mG (OsCal) 1 Tablet(s) Oral daily  dicyclomine 10 milliGRAM(s) Oral daily  magnesium oxide 400 milliGRAM(s) Oral three times a day with meals  meropenem  IVPB      meropenem  IVPB 1000 milliGRAM(s) IV Intermittent every 8 hours  PHENobarbital 64.8 milliGRAM(s) Oral daily  polyethylene glycol 3350 17 Gram(s) Oral every 12 hours  senna 2 Tablet(s) Oral at bedtime  sodium chloride 0.9%. 1000 milliLiter(s) (125 mL/Hr) IV Continuous <Continuous>  tamsulosin 0.4 milliGRAM(s) Oral at bedtime    MEDICATIONS  (PRN):  acetaminophen   Tablet .. 650 milliGRAM(s) Oral every 6 hours PRN Temp greater or equal to 38C (100.4F)  ALBUTerol    90 MICROgram(s) HFA Inhaler 2 Puff(s) Inhalation every 6 hours PRN Shortness of Breath and/or Wheezing  benzonatate 100 milliGRAM(s) Oral every 8 hours PRN Cough  ondansetron Injectable 4 milliGRAM(s) IV Push every 6 hours PRN Nausea    Vital signs  T(C): , Max: 36.7 (05-26-20 @ 17:00)  HR: 90 (05-26-20 @ 17:00)  BP: 100/55 (05-26-20 @ 17:00)  SpO2: 96% (05-26-20 @ 17:00)    Constitutional: NAD, well-developed  HEENT: NCAT  Neck: no pain  Respiratory: No accessory respiratory muscle use  Abd: Soft, NT/ND. No flank tenderness. No suprapubic tenderness. + SPT draining blood tinged urine.   : + R nephrostomy in place draining serosanguinous fluid   Extremities: no edema, paraplegic  Skin: No obvious rashes    I&O's Detail    25 May 2020 07:01  -  26 May 2020 07:00  --------------------------------------------------------  IN:    Enteral Tube Flush: 200 mL    lactated ringers.: 975 mL    ns in tub fed  [id number]: 600 mL  Total IN: 1775 mL    OUT:    Indwelling Catheter - Suprapubic: 1195 mL    Nephrostomy Tube: 1240 mL  Total OUT: 2435 mL    Total NET: -660 mL      26 May 2020 07:01  -  26 May 2020 18:44  --------------------------------------------------------  IN:  Total IN: 0 mL    OUT:    Nephrostomy Tube: 220 mL  Total OUT: 220 mL    Total NET: -220 mL          Labs                        11.4   4.96  )-----------( 208      ( 26 May 2020 05:56 )             34.7     26 May 2020 05:56    139    |  101    |  11     ----------------------------<  88     4.9     |  28     |  <0.5     Ca    8.6        26 May 2020 05:56  Mg     2.1       26 May 2020 05:56

## 2020-05-26 NOTE — PROGRESS NOTE ADULT - ASSESSMENT
65 y/o M a/w sepsis found to have R hydronephrosis 2/2 obstructing proximal R ureteral calculi s/p R PCNL POD#0.     Plan:  - DVT/GI ppx  - Incentive spirometry  - Analgesia/antiemetics PRN  - c/w IVF  - c/w abx  - Tube feeds  - CT Stentogram 5/27  - will d/w attng 65 y/o M a/w sepsis found to have R hydronephrosis 2/2 obstructing proximal R ureteral calculi s/p R PCNL POD#0.     Plan:  - DVT/GI ppx  - Incentive spirometry  - Analgesia/antiemetics PRN  - c/w IVF  - c/w abx  - Tube feeds  - CT Stentogram 5/27  - note reviewed and amended as needed

## 2020-05-26 NOTE — PROGRESS NOTE ADULT - SUBJECTIVE AND OBJECTIVE BOX
TISH SHAIKH  64y  [facility] [id number] Neuro [address]      Patient is a 64y old  Male who presents with a chief complaint of abd pain and fever (26 May 2020 09:13)      INTERVAL HPI/OVERNIGHT EVENTS:      no acute event overnight   REVIEW OF SYSTEMS:  Unable to ROS due to MS     T(C): 36.2 (05-26-20 @ 06:40), Max: 36.6 (05-25-20 @ 21:01)  HR: 82 (05-26-20 @ 06:40) (67 - 82)  BP: 138/72 (05-26-20 @ 06:40) (101/50 - 138/72)  RR: 18 (05-26-20 @ 06:40) (16 - 18)  SpO2: --  Wt(kg): --Vital Signs Last 24 Hrs  T(C): 36.2 (26 May 2020 06:40), Max: 36.6 (25 May 2020 21:01)  T(F): 97.1 (26 May 2020 06:40), Max: 97.9 (25 May 2020 21:01)  HR: 82 (26 May 2020 06:40) (67 - 82)  BP: 138/72 (26 May 2020 06:40) (101/50 - 138/72)  BP(mean): --  RR: 18 (26 May 2020 06:40) (16 - 18)  SpO2: --    PHYSICAL EXAM:  GEN Lying in no acute distress  HEENT Pupils equal and reactive to light and accommodationSupple Neck  PULM Clear to auscultation bilaterally  CV s1s2 regular rate and rhythm  GI + bowel sounds nontnender  EXT no cyanosis or edema  INTEG No Lesions  NEURO REBOLLAR      LABS:                            11.4   4.96  )-----------( 208      ( 26 May 2020 05:56 )             34.7   05-26    139  |  101  |  11  ----------------------------<  88  4.9   |  28  |  <0.5<L>    Ca    8.6      26 May 2020 05:56  Mg     2.1     05-26    TPro  6.2  /  Alb  3.2<L>  /  TBili  0.2  /  DBili  x   /  AST  50<H>  /  ALT  46<H>  /  AlkPhos  80  05-26            Culture - Blood (collected 25 May 2020 07:10)  Source: .Blood None  Preliminary Report (26 May 2020 12:01):    No growth to date.      acetaminophen   Tablet .. 650 milliGRAM(s) Oral every 6 hours PRN  ALBUTerol    90 MICROgram(s) HFA Inhaler 2 Puff(s) Inhalation every 6 hours PRN  baclofen 10 milliGRAM(s) Oral three times a day  benzonatate 100 milliGRAM(s) Oral every 8 hours PRN  calcium carbonate   1250 mG (OsCal) 1 Tablet(s) Oral two times a day  dicyclomine 10 milliGRAM(s) Oral daily  enoxaparin Injectable 40 milliGRAM(s) SubCutaneous at bedtime  lactated ringers. 1000 milliLiter(s) IV Continuous <Continuous>  magnesium oxide 400 milliGRAM(s) Oral three times a day with meals  meropenem  IVPB 1000 milliGRAM(s) IV Intermittent every 8 hours  ondansetron Injectable 4 milliGRAM(s) IV Push every 6 hours PRN  PHENobarbital 64.8 milliGRAM(s) Oral daily  polyethylene glycol 3350 17 Gram(s) Oral every 12 hours  polyvinyl alcohol 1.4%/povidone 0.6% Ophthalmic Solution - Peds 1 Drop(s) Both EYES four times a day  senna 2 Tablet(s) Oral at bedtime  tamsulosin 0.4 milliGRAM(s) Oral at bedtime      HEALTH ISSUES - PROBLEM Dx:          Case Discussed with House Staff   45 minutes spent on total encounter; more than 50% of the visit was spent counseling and/or coordinating care by the attending physician.   Spectra [phone]

## 2020-05-26 NOTE — BRIEF OPERATIVE NOTE - NSICDXBRIEFPREOP_GEN_ALL_CORE_FT
PRE-OP DIAGNOSIS:  Bacterial UTI 26-May-2020 15:36:08  Bonnie Cooper  Hydronephrosis with urinary obstruction due to ureteral calculus 26-May-2020 15:35:23  Bonnie Cooper  Right renal stone 26-May-2020 15:34:35  Bnonie Cooper

## 2020-05-26 NOTE — DISCHARGE NOTE PROVIDER - CARE PROVIDER_API CALL
Bonnie Cooper  Urology  [address] [place] [id number]  Phone: [phone]  Fax: [phone]  Follow Up Time: 2 weeks

## 2020-05-26 NOTE — PROGRESS NOTE ADULT - ASSESSMENT
Patient is a 63 y/o male with PMH of cerebral palsy, seizure disorder, spastic paraplegia, s/p PEG tube, Asthma, H/O nephrolithiasis, BPH with bladder neck stricture s/p suprapubic catheter, and H/O Pseudomonas and ESBL Proteus mirabilis bacteruria who presented from a group with abdominal pain x1 days.       #urosepsis (resolving) 2/2 pyelonephritis with hydronephrosis and ureter stones with proteus ESBL bacteruria and bacteremia   - s/p R PCT nephrostomy with stent on 5/20, going for Percutaneous nephrolithotomy (PCNL) today  - Pain control with Toradol PRN, monitor renal function   - IVF, Tamsulosin and Bentyl to facilitate passage   - c/w Meropenem (proteus ESBL bacteruria and bacteremia), needs Midline and Ertapenem to complete 14 days   - repeat Bcx negative       #lactic acidosis: (reolved) possibly seizure after nephrostomy  with subtherapeutic phenobarb level  - phenobarb level is low, REEG  no seizure, just slowing, as per Neuro (spoke with Dr. Shipley), no dose adjustment needed,   - lactate level normalized    #acute normocytic anemia: Hbg stable, likely dilutional, no signs of blood loss, monitor Hbg and keep active type and screening     #Abd distention: resolved, normal KUB  - PEG tube feeding  - bowel regimen     # URI symptoms w/ COVID contact in last 2 weeks -COVID PCR negative on 5/19, has lymphopenia looks chronic     # chronic Cough is unlikely from [place] but non COVID bronchitis     # Seizure disorder - Continue with phenobarbital 64.8mg PO through PEG tube QD    # Cerebral palsy w/ Spastic quadriplegia - Continue with baclofen 10mg PO three times a day, c/w PEG feeds     # Functional quadriplegia - full care    # Asthma - No wheezing on exam, on RA, Albuterol PRN     # BPH with bladder neck stricture s/p suprapubic catheter - Tube replaced day before admission, Chronic colonization w/ ESBL Proteus Mirabilis, grew Pseudomonas on last admission,    #DVT Prophylaxis: Lovenox 40mg SQ once daily,     #GI Prophylaxis: Not indicated     #Diet: through PEG tube feeding    Activity: bed rest     Dispo: [facility], , Leslie ([phone]) is the sister and health care proxy, Mitch is the nurse at [facility] [phone],   Code Status: Full Code  Dispo: to group home

## 2020-05-26 NOTE — PROGRESS NOTE ADULT - SUBJECTIVE AND OBJECTIVE BOX
Patient is a 64y old  Male who presents with a chief complaint of Abd pain and fever (25 May 2020 11:17)      OVERNIGHT EVENTS: remained stable    SUBJECTIVE / INTERVAL HPI: Patient seen and examined at bedside.     VITAL SIGNS:  Vital Signs Last 24 Hrs  T(C): 36.2 (26 May 2020 06:40), Max: 36.6 (25 May 2020 21:01)  T(F): 97.1 (26 May 2020 06:40), Max: 97.9 (25 May 2020 21:01)  HR: 82 (26 May 2020 06:40) (67 - 82)  BP: 138/72 (26 May 2020 06:40) (101/50 - 138/72)  BP(mean): --  RR: 18 (26 May 2020 06:40) (16 - 18)  SpO2: --    PHYSICAL EXAM:    General: WDWN  HEENT: NC/AT; PERRL, clear conjunctiva  Neck: supple  Cardiovascular: +S1/S2; RRR  Respiratory: CTA b/l; no W/R/R  Gastrointestinal: soft, NT/ND; +BSx4  Extremities: WWP; 2+ peripheral pulses; no edema   Neurological: AAOx3; spastic extremities, quadriplegia     MEDICATIONS:  MEDICATIONS  (STANDING):  baclofen 10 milliGRAM(s) Oral three times a day  calcium carbonate   1250 mG (OsCal) 1 Tablet(s) Oral two times a day  dicyclomine 10 milliGRAM(s) Oral daily  enoxaparin Injectable 40 milliGRAM(s) SubCutaneous at bedtime  lactated ringers. 1000 milliLiter(s) (75 mL/Hr) IV Continuous <Continuous>  magnesium oxide 400 milliGRAM(s) Oral three times a day with meals  meropenem  IVPB 1000 milliGRAM(s) IV Intermittent every 8 hours  PHENobarbital 64.8 milliGRAM(s) Oral daily  polyethylene glycol 3350 17 Gram(s) Oral every 12 hours  polyvinyl alcohol 1.4%/povidone 0.6% Ophthalmic Solution - Peds 1 Drop(s) Both EYES four times a day  senna 2 Tablet(s) Oral at bedtime  tamsulosin 0.4 milliGRAM(s) Oral at bedtime    MEDICATIONS  (PRN):  acetaminophen   Tablet .. 650 milliGRAM(s) Oral every 6 hours PRN Temp greater or equal to 38C (100.4F)  ALBUTerol    90 MICROgram(s) HFA Inhaler 2 Puff(s) Inhalation every 6 hours PRN Shortness of Breath and/or Wheezing  benzonatate 100 milliGRAM(s) Oral every 8 hours PRN Cough  ondansetron Injectable 4 milliGRAM(s) IV Push every 6 hours PRN Nausea      ALLERGIES:  Allergies    No Known Allergies    Intolerances        LABS:                        11.4   4.96  )-----------( 208      ( 26 May 2020 05:56 )             34.7     05-26    139  |  101  |  11  ----------------------------<  88  4.9   |  28  |  <0.5<L>    Ca    8.6      26 May 2020 05:56  Mg     2.1     05-26    TPro  6.2  /  Alb  3.2<L>  /  TBili  0.2  /  DBili  x   /  AST  50<H>  /  ALT  46<H>  /  AlkPhos  80  05-26    PT/INR - ( 26 May 2020 05:56 )   PT: 12.20 sec;   INR: 1.06 ratio         PTT - ( 26 May 2020 05:56 )  PTT:36.6 sec    CAPILLARY BLOOD GLUCOSE          Culture - Blood in AM (05.22.20 @ 06:19)    Specimen Source: .Blood None    Culture Results:   No growth to date.    Culture - Blood (05.20.20 @ 16:20)    -  Amikacin: S <=16    -  Ampicillin: R >16 These ampicillin results predict results for amoxicillin    -  Ampicillin/Sulbactam: R 8/4 Enterobacter, Citrobacter, and Serratia may develop resistance during prolonged therapy (3-4 days)    -  Aztreonam: R 16    Gram Stain:   Growth in anaerobic bottle: Gram Negative Rods  Growth in aerobic bottle: Gram Negative Rods    -  Coagulase negative Staphylococcus: Nondet    -  Enterococcus species: Nondet    -  Vancomycin resistant Enterococcus sp.: Nondet    -  Escherichia coli: Nondet    -  Klebsiella oxytoca: Nondet    -  Klebsiella pneumoniae: Nondet    -  Serratia marcescens: Nondet    -  Haemophilus influenzae: Nondet    -  Listeria monocytogenes: Nondet    -  Neisseria meningitidis: Nondet    -  Pseudomonas aeruginosa: Nondet    -  Acinetobacter baumanii: Nondet    -  Enterobacter cloacae complex: Nondet    -  Streptococcus sp. (Not Grp A, B or S pneumoniae): Nondet    -  Streptococcus agalactiae (Group B): Nondet    -  Streptococcus pyogenes (Group A): Nondet    -  Streptococcus pneumoniae: Nondet    -  Candida albicans: Nondet    -  Candida glabrata: Nondet    -  Candida krusei: Nondet    -  Candida parapsilosis: Nondet    -  Candida tropicalis: Nondet    -  Tobramycin: S <=2    -  Trimethoprim/Sulfamethoxazole: R >2/38    -  Multidrug (KPC pos) resistant organism: Nondet    -  Staphylococcus aureus: Nondet    -  Methicillin resistant Staphylococcus aureus (MRSA): Nondet    -  Cefoxitin: S <=8    -  Cefepime: R >16    -  Cefazolin: R >16 Enterobacter, Citrobacter, and Serratia may develop resistance during prolonged therapy (3-4 days)    -  Ceftriaxone: R >32 Enterobacter, Citrobacter, and Serratia may develop resistance during prolonged therapy    -  Ciprofloxacin: R >2    -  Ertapenem: S <=0.5    -  Gentamicin: S <=2    -  Levofloxacin: R >4    -  Piperacillin/Tazobactam: R <=8    -  Meropenem: S <=1    Specimen Source: .Blood Blood-Venous    Organism: Blood Culture PCR    Organism: Proteus mirabilis ESBL    Culture Results:   Growth in aerobic and anaerobic bottles: Proteus mirabilis ESBL  "Due to technical problems, Proteus sp. will Not be reported as part of  the BCID panel until further notice"  ***Blood Panel PCR results on this specimen are available  approximately3 hours after the Gram stain result.***  Gram stain, PCR, and/or culture results may not always  correspond due to difference in methodologies.  ************************************************************  This PCR assay was performed using [employer].  The following targets are tested for: Enterococcus,  vancomycin resistant enterococci, Listeria monocytogenes,  coagulase negative staphylococci, S. aureus,  methicillin resistant S. aureus, Streptococcus agalactiae  (Group B), S. pneumoniae, S. pyogenes (Group A),  Acinetobacter baumannii, Enterobacter cloacae, E. coli,  Klebsiella oxytoca, K. pneumoniae, Proteus sp.,  Serratia marcescens, Haemophilus influenzae,  Neisseria meningitidis, Pseudomonas aeruginosa, Candida  albicans, C. glabrata, C krusei, C parapsilosis,  C. tropicalis and the KPC resistance gene.    Organism Identification: Blood Culture PCR  Proteus mirabilis ESBL    Method Type: PCR    Method Type: MIGUEL A    Culture - Urine (05.20.20 @ 13:55)    -  Trimethoprim/Sulfamethoxazole: R >2/38    -  Vancomycin: S 2    -  Aztreonam: R <=4    -  Ampicillin/Sulbactam: R <=8/4 Enterobacter, Citrobacter, and Serratia may develop resistance during prolonged therapy (3-4 days)    -  Ampicillin/Sulbactam: S <=8/4    -  Ampicillin: R >16 These ampicillin results predict results for amoxicillin    -  Amikacin: S <=16    -  Trimethoprim/Sulfamethoxazole: S <=0.5/9.5    -  Tobramycin: S <=4    -  RIF- Rifampin: S <=1 Should not be used as monotherapy    -  Tetra/Doxy: S <=1    -  Meropenem: S <=1    -  Piperacillin/Tazobactam: R <=16    -  Nitrofurantoin: R >64 Should not be used to treat pyelonephritis    -  Oxacillin: S 0.5    -  Penicillin: R >8    -  Levofloxacin: R >4    -  Gentamicin: S <=4    -  Gentamicin: S <=1 Should not be used as monotherapy    -  Ertapenem: S <=1    -  Ciprofloxacin: R >2    -  Ceftriaxone: R 32 Enterobacter, Citrobacter, and Serratia may develop resistance during prolonged therapy    -  Cefazolin: R >16 (MIC_CL_COM_ENTERIC_CEFAZU) For uncomplicated UTI with K. pneumoniae, E. coli, or P. mirablis: MIGUEL A <=16 is sensitive and MIGUEL A >=32 is resistant. This also predicts results for oral agents cefaclor, cefdinir, cefpodoxime, cefprozil, cefuroxime axetil, cephalexin and locarbef for uncomplicated UTI. Note that some isolates may be susceptible to these agents while testing resistant to cefazolin.    -  Cefazolin: S <=4    -  Cefepime: R 8    -  Cefoxitin: S <=8    Specimen Source: .Urine None    Culture Results:   Moderate Proteus mirabilis ESBL  Moderate Staphylococcus aureus    Organism Identification: Proteus mirabilis ESBL  Staphylococcus aureus    Organism: Proteus mirabilis ESBL    Organism: Staphylococcus aureus    Method Type: MIGUEL A    Method Type: MIGUEL A

## 2020-05-26 NOTE — DISCHARGE NOTE PROVIDER - NSDCCPCAREPLAN_GEN_ALL_CORE_FT
PRINCIPAL DISCHARGE DIAGNOSIS  Diagnosis: Kidney stone  Assessment and Plan of Treatment: patient is  Right PCT nephrostomy   with stent on 5/20, followed with Percutaneous nephrolithotomy (PCNL) on 5/26), no acute intervention required for now, follow up with urology clinic, suprapubic catheter change every 4 weeks,   should be removed 2 weeks after discharge,      SECONDARY DISCHARGE DIAGNOSES  Diagnosis: COVID-19  Assessment and Plan of Treatment: as screening prior to discharge to [facility], you tested positive for the COVID priorr to discharge, no fever, cough or shortness of breath, if you develop these symptoms get evaluation by ER    Diagnosis: Seizure  Assessment and Plan of Treatment: continue with phenobarbital    Diagnosis: Urinary tract infection  Assessment and Plan of Treatment: continue with Ertapenem 1 gram IV once every day through midline till 6/8/2020 PRINCIPAL DISCHARGE DIAGNOSIS  Diagnosis: Kidney stone  Assessment and Plan of Treatment: patient is  Right PCT nephrostomy   with stent on 5/20, followed with Percutaneous nephrolithotomy (PCNL) on 5/26), no acute intervention required for now, follow up with urology clinic, suprapubic catheter change every 4 weeks,   should be removed 2 weeks after discharge,      SECONDARY DISCHARGE DIAGNOSES  Diagnosis: COVID-19  Assessment and Plan of Treatment: as screening prior to discharge to [facility], you tested positive for the COVID priorr to discharge, no fever, cough or shortness of breath, if you develop these symptoms get evaluation by ER    Diagnosis: Seizure  Assessment and Plan of Treatment: continue with phenobarbital    Diagnosis: Urinary tract infection  Assessment and Plan of Treatment: completed course  Ertapenem IV.

## 2020-05-26 NOTE — DISCHARGE NOTE PROVIDER - HOSPITAL COURSE
Patient is a 63 y/o male with PMH of cerebral palsy, seizure disorder, spastic paraplegia, s/p PEG tube, Asthma, H/O nephrolithiasis, BPH with bladder neck     stricture s/p suprapubic catheter, and H/O Pseudomonas and ESBL Proteus mirabilis bacteruria who presented from a group with abdominal pain x1 days.     Pt states that he started getting a R sided flank pain with radiation to his pelvis after getting his suprapubic cathter tube changes.    pt was diagnosed with urospesis on top of urinary obstruction due to ureter stone and LANA, pt started on antibiotics, IVF, and underwent Right PCT nephrostomy     with stent on 5/20, followed with Percutaneous nephrolithotomy (PCNL), pt has been improving and medically cleared for discharge and to continue antibiotics course    till 5/8/2020 via midline. Patient is a 63 y/o male with PMH of cerebral palsy, seizure disorder, spastic paraplegia, s/p PEG tube, Asthma, H/O nephrolithiasis, BPH with bladder neck     stricture s/p suprapubic catheter, and H/O Pseudomonas and ESBL Proteus mirabilis bacteruria who presented from a group with abdominal pain x1 days.     Pt states that he started getting a R sided flank pain with radiation to his pelvis after getting his suprapubic cathter tube changes.    pt was diagnosed with urospesis on top of urinary obstruction due to ureter stone and LANA, pt started on antibiotics, IVF, and underwent Right PCT nephrostomy     with stent on 5/20, followed with Percutaneous nephrolithotomy (PCNL), pt has been improving and medically cleared for discharge and to continue antibiotics course    till 6/8/2020 via midline. Patient is a 63 y/o male with PMH of cerebral palsy, seizure disorder, spastic paraplegia, s/p PEG tube, Asthma, H/O nephrolithiasis, BPH with bladder neck     stricture s/p suprapubic catheter, and H/O Pseudomonas and ESBL Proteus mirabilis bacteruria who presented from a group with abdominal pain x1 days.     Pt states that he started getting a R sided flank pain with radiation to his pelvis after getting his suprapubic cathter tube changes.    pt was diagnosed with urospesis on top of urinary obstruction due to ureter stone and LANA, pt started on antibiotics, IVF, and underwent Right PCT nephrostomy     with stent on 5/20, followed with Percutaneous nephrolithotomy (PCNL), pt has been improving and medically cleared for discharge and to continue antibiotics course    till 6/8/2020 via midline.     pt was swaped for COVID as screening prior to disposition to group [facility] and tested +ve, pot has no fever, cough or SOB.

## 2020-05-26 NOTE — BRIEF OPERATIVE NOTE - NSICDXBRIEFPOSTOP_GEN_ALL_CORE_FT
POST-OP DIAGNOSIS:  Right renal stone 26-May-2020 15:35:52  Bonnie Cooper  Hydronephrosis with ureteral calculus 26-May-2020 15:35:44  Bonnie Cooper

## 2020-05-26 NOTE — PRE-OP CHECKLIST - LATEX ALLERGY
Chest pain since 1730, with back pain over the past week. Felt \"hot\" with chest pain. No nausea. No cardiac history.   no

## 2020-05-26 NOTE — DISCHARGE NOTE PROVIDER - INSTRUCTIONS
Diet, NPO with Tube Feed, Jevity 1.2 Sarmad    total feeding per day 1200cc  feed by boluses  4 boluses of 300cc, per day every 6 hours  give the bolus over 1 hour

## 2020-05-27 LAB
ALBUMIN SERPL ELPH-MCNC: 2.7 G/DL — LOW (ref 3.5–5.2)
ALP SERPL-CCNC: 75 U/L — SIGNIFICANT CHANGE UP (ref 30–115)
ALT FLD-CCNC: 39 U/L — SIGNIFICANT CHANGE UP (ref 0–41)
ANION GAP SERPL CALC-SCNC: 8 MMOL/L — SIGNIFICANT CHANGE UP (ref 7–14)
AST SERPL-CCNC: 37 U/L — SIGNIFICANT CHANGE UP (ref 0–41)
BASOPHILS # BLD AUTO: 0.03 K/UL — SIGNIFICANT CHANGE UP (ref 0–0.2)
BASOPHILS NFR BLD AUTO: 0.5 % — SIGNIFICANT CHANGE UP (ref 0–1)
BILIRUB SERPL-MCNC: <0.2 MG/DL — SIGNIFICANT CHANGE UP (ref 0.2–1.2)
BUN SERPL-MCNC: 12 MG/DL — SIGNIFICANT CHANGE UP (ref 10–20)
CALCIUM SERPL-MCNC: 8 MG/DL — LOW (ref 8.5–10.1)
CHLORIDE SERPL-SCNC: 106 MMOL/L — SIGNIFICANT CHANGE UP (ref 98–110)
CO2 SERPL-SCNC: 25 MMOL/L — SIGNIFICANT CHANGE UP (ref 17–32)
CREAT SERPL-MCNC: <0.5 MG/DL — LOW (ref 0.7–1.5)
CULTURE RESULTS: SIGNIFICANT CHANGE UP
EOSINOPHIL # BLD AUTO: 0.12 K/UL — SIGNIFICANT CHANGE UP (ref 0–0.7)
EOSINOPHIL NFR BLD AUTO: 2.1 % — SIGNIFICANT CHANGE UP (ref 0–8)
GLUCOSE SERPL-MCNC: 95 MG/DL — SIGNIFICANT CHANGE UP (ref 70–99)
HCT VFR BLD CALC: 32.4 % — LOW (ref 42–52)
HGB BLD-MCNC: 10.7 G/DL — LOW (ref 14–18)
IMM GRANULOCYTES NFR BLD AUTO: 0.9 % — HIGH (ref 0.1–0.3)
LYMPHOCYTES # BLD AUTO: 1.47 K/UL — SIGNIFICANT CHANGE UP (ref 1.2–3.4)
LYMPHOCYTES # BLD AUTO: 25.1 % — SIGNIFICANT CHANGE UP (ref 20.5–51.1)
MCHC RBC-ENTMCNC: 31.3 PG — HIGH (ref 27–31)
MCHC RBC-ENTMCNC: 33 G/DL — SIGNIFICANT CHANGE UP (ref 32–37)
MCV RBC AUTO: 94.7 FL — HIGH (ref 80–94)
MONOCYTES # BLD AUTO: 0.75 K/UL — HIGH (ref 0.1–0.6)
MONOCYTES NFR BLD AUTO: 12.8 % — HIGH (ref 1.7–9.3)
NEUTROPHILS # BLD AUTO: 3.43 K/UL — SIGNIFICANT CHANGE UP (ref 1.4–6.5)
NEUTROPHILS NFR BLD AUTO: 58.6 % — SIGNIFICANT CHANGE UP (ref 42.2–75.2)
NRBC # BLD: 0 /100 WBCS — SIGNIFICANT CHANGE UP (ref 0–0)
PLATELET # BLD AUTO: 222 K/UL — SIGNIFICANT CHANGE UP (ref 130–400)
POTASSIUM SERPL-MCNC: 4.3 MMOL/L — SIGNIFICANT CHANGE UP (ref 3.5–5)
POTASSIUM SERPL-SCNC: 4.3 MMOL/L — SIGNIFICANT CHANGE UP (ref 3.5–5)
PROT SERPL-MCNC: 5.6 G/DL — LOW (ref 6–8)
RBC # BLD: 3.42 M/UL — LOW (ref 4.7–6.1)
RBC # FLD: 14.1 % — SIGNIFICANT CHANGE UP (ref 11.5–14.5)
SODIUM SERPL-SCNC: 139 MMOL/L — SIGNIFICANT CHANGE UP (ref 135–146)
SPECIMEN SOURCE: SIGNIFICANT CHANGE UP
WBC # BLD: 5.85 K/UL — SIGNIFICANT CHANGE UP (ref 4.8–10.8)
WBC # FLD AUTO: 5.85 K/UL — SIGNIFICANT CHANGE UP (ref 4.8–10.8)

## 2020-05-27 PROCEDURE — 99232 SBSQ HOSP IP/OBS MODERATE 35: CPT

## 2020-05-27 PROCEDURE — 74176 CT ABD & PELVIS W/O CONTRAST: CPT | Mod: 26

## 2020-05-27 PROCEDURE — 50389 REMOVE RENAL TUBE W/FLUORO: CPT | Mod: RT

## 2020-05-27 RX ORDER — HEPARIN SODIUM 5000 [USP'U]/ML
5000 INJECTION INTRAVENOUS; SUBCUTANEOUS EVERY 8 HOURS
Refills: 0 | Status: DISCONTINUED | OUTPATIENT
Start: 2020-05-27 | End: 2020-05-28

## 2020-05-27 RX ADMIN — MAGNESIUM OXIDE 400 MG ORAL TABLET 400 MILLIGRAM(S): 241.3 TABLET ORAL at 12:22

## 2020-05-27 RX ADMIN — Medication 10 MILLIGRAM(S): at 05:35

## 2020-05-27 RX ADMIN — MEROPENEM 100 MILLIGRAM(S): 1 INJECTION INTRAVENOUS at 05:21

## 2020-05-27 RX ADMIN — HEPARIN SODIUM 5000 UNIT(S): 5000 INJECTION INTRAVENOUS; SUBCUTANEOUS at 21:48

## 2020-05-27 RX ADMIN — MAGNESIUM OXIDE 400 MG ORAL TABLET 400 MILLIGRAM(S): 241.3 TABLET ORAL at 05:36

## 2020-05-27 RX ADMIN — Medication 64.8 MILLIGRAM(S): at 12:22

## 2020-05-27 RX ADMIN — MAGNESIUM OXIDE 400 MG ORAL TABLET 400 MILLIGRAM(S): 241.3 TABLET ORAL at 18:39

## 2020-05-27 RX ADMIN — Medication 1 TABLET(S): at 05:35

## 2020-05-27 RX ADMIN — POLYETHYLENE GLYCOL 3350 17 GRAM(S): 17 POWDER, FOR SOLUTION ORAL at 05:35

## 2020-05-27 RX ADMIN — Medication 10 MILLIGRAM(S): at 21:47

## 2020-05-27 RX ADMIN — Medication 1 TABLET(S): at 18:39

## 2020-05-27 RX ADMIN — TAMSULOSIN HYDROCHLORIDE 0.4 MILLIGRAM(S): 0.4 CAPSULE ORAL at 21:49

## 2020-05-27 RX ADMIN — Medication 1 TABLET(S): at 12:22

## 2020-05-27 RX ADMIN — MEROPENEM 100 MILLIGRAM(S): 1 INJECTION INTRAVENOUS at 13:50

## 2020-05-27 RX ADMIN — MEROPENEM 100 MILLIGRAM(S): 1 INJECTION INTRAVENOUS at 21:50

## 2020-05-27 RX ADMIN — Medication 10 MILLIGRAM(S): at 12:22

## 2020-05-27 RX ADMIN — Medication 10 MILLIGRAM(S): at 13:50

## 2020-05-27 NOTE — PROGRESS NOTE ADULT - SUBJECTIVE AND OBJECTIVE BOX
Patient is a 64y old  Male who presents with a chief complaint of abd pain and fever (26 May 2020 09:13)      OVERNIGHT EVENTS: s/p R PCNL with right , remained stable,    SUBJECTIVE / INTERVAL HPI: Patient seen and examined at bedside.     VITAL SIGNS:  Vital Signs Last 24 Hrs  T(C): 36.9 (27 May 2020 05:29), Max: 36.9 (27 May 2020 05:29)  T(F): 98.4 (27 May 2020 05:29), Max: 98.4 (27 May 2020 05:29)  HR: 68 (27 May 2020 05:29) (68 - 96)  BP: 99/53 (27 May 2020 05:29) (96/54 - 117/71)  BP(mean): --  RR: 18 (27 May 2020 05:29) (16 - 22)  SpO2: 96% (26 May 2020 17:00) (95% - 98%)    PHYSICAL EXAM:    General: WDWN  HEENT: NC/AT; PERRL, clear conjunctiva  Neck: supple  Cardiovascular: +S1/S2; RRR  Respiratory: CTA b/l; no W/R/R  Gastrointestinal: soft, NT/ND; +BSx4  Extremities: WWP; 2+ peripheral pulses; no edema   Neurological: AAOx3; no focal deficits    MEDICATIONS:  MEDICATIONS  (STANDING):  baclofen 10 milliGRAM(s) Oral three times a day  calcium carbonate   1250 mG (OsCal) 1 Tablet(s) Oral two times a day  calcium carbonate   1250 mG (OsCal) 1 Tablet(s) Oral daily  dicyclomine 10 milliGRAM(s) Oral daily  magnesium oxide 400 milliGRAM(s) Oral three times a day with meals  meropenem  IVPB      meropenem  IVPB 1000 milliGRAM(s) IV Intermittent every 8 hours  PHENobarbital 64.8 milliGRAM(s) Oral daily  polyethylene glycol 3350 17 Gram(s) Oral every 12 hours  senna 2 Tablet(s) Oral at bedtime  tamsulosin 0.4 milliGRAM(s) Oral at bedtime    MEDICATIONS  (PRN):  acetaminophen   Tablet .. 650 milliGRAM(s) Oral every 6 hours PRN Temp greater or equal to 38C (100.4F)  ALBUTerol    90 MICROgram(s) HFA Inhaler 2 Puff(s) Inhalation every 6 hours PRN Shortness of Breath and/or Wheezing  benzonatate 100 milliGRAM(s) Oral every 8 hours PRN Cough  ondansetron Injectable 4 milliGRAM(s) IV Push every 6 hours PRN Nausea      ALLERGIES:  Allergies    No Known Allergies    Intolerances        LABS:                        10.7   5.85  )-----------( 222      ( 27 May 2020 04:45 )             32.4     05-27    139  |  106  |  12  ----------------------------<  95  4.3   |  25  |  <0.5<L>    Ca    8.0<L>      27 May 2020 04:45  Mg     2.1     05-26    TPro  5.6<L>  /  Alb  2.7<L>  /  TBili  <0.2  /  DBili  x   /  AST  37  /  ALT  39  /  AlkPhos  75  05-27    PT/INR - ( 26 May 2020 05:56 )   PT: 12.20 sec;   INR: 1.06 ratio         PTT - ( 26 May 2020 05:56 )  PTT:36.6 sec

## 2020-05-27 NOTE — PROGRESS NOTE ADULT - SUBJECTIVE AND OBJECTIVE BOX
Patient is a 64y old  Male who presents with a chief complaint of sepsis (24 May 2020 08:45)    INTERVAL HPI/OVERNIGHT EVENTS: no complaints, feels well  ROS: Denies CP, SOB, AP, new weakness  All other systems reviewed and are within normal limits.  InitialHPI:  Patient is a 65 y/o male with PMH of cerebral palsy, seizure disorder, spastic paraplegia, s/p PEG tube, Asthma, H/O nephrolithiasis, BPH with bladder neck stricture s/p suprapubic catheter, and H/O Pseudomonas and ESBL Proteus mirabilis bacteruria who presented from a group with abdominal pain x1 days. Pt states that he started getting a R sided flank pain with radiation to his pelvis after getting his suprapubic cathter tube changes. Pt states that the pain is sharp and constant. Pt also endorses some nausea and vomited x1 in the AM. He also has some pain at the catheter insertion site however catheter has been draining well. Off note pt has a progressive worsening congestion for the last few days. As per aid, pts group home had a resident test postive for COVID. Pt was likely in close contact with resident. Pt also endorses recent subjective fevers. He has had a cough for the last month, pt was recently admitted to the ICU for non-COVID coronavirus bronchitis, never intubated. Denies any other complaints. Denies any chills, changes in weight, chest pain, SOB, diarrhea, myalgias, arthralgias syncope, fatigue.   In the ED, /67  RR 18 SpO2 94 on RA afebrile. CT A/P showed a [id number] proximal right ureteral stone with mod hydronephrosis. Urolgoy was consulted. Pt was given IVF, pain control and Merox1. Swabbed for COVID. (19 May 2020 08:44)    KIDNEY STONE;HYDRONEPHROSIS;URINARY TRACT INFECTION  ^KIDNEY STONE;HYDRONEPHROSIS;URINARY TRACT INFECTION  Family history unknown  No pertinent family history in first degree relatives  Handoff  MEWS Score  Asthma  Urinary retention  Urinary calculi  Spastic quadriplegia  Osteoporosis  Seizure  Cerebral palsy  BPH (benign prostatic hyperplasia)  Kidney stone  Suprapubic catheter  History of suprapubic catheter  H/O cystoscopy  S/P percutaneous endoscopic gastrostomy (PEG) tube placement  CATHETER PROBLEM  52  Urinary tract infection  Hydronephrosis    PAST MEDICAL & SURGICAL HISTORY:  Asthma  Urinary retention  Urinary calculi  Spastic quadriplegia  Osteoporosis  Seizure: last seizure &gt;10 years ago  Cerebral palsy  BPH (benign prostatic hyperplasia)  Suprapubic catheter  H/O cystoscopy  S/P percutaneous endoscopic gastrostomy (PEG) tube placement      General: NAD, AAO3, mild dysarthria  CV: S1 S2  Resp: decreased breath sounds at bases  GI: NT/ND/S +BS, +PEG, +suprapubic cath +Rt post nephro cath   MS: contracted b/l UE and LE, spastic quadriplegia            MEDICATIONS  (STANDING):  baclofen 10 milliGRAM(s) Oral three times a day  calcium carbonate   1250 mG (OsCal) 1 Tablet(s) Oral two times a day  calcium carbonate   1250 mG (OsCal) 1 Tablet(s) Oral daily  dicyclomine 10 milliGRAM(s) Oral daily  magnesium oxide 400 milliGRAM(s) Oral three times a day with meals  meropenem  IVPB      meropenem  IVPB 1000 milliGRAM(s) IV Intermittent every 8 hours  PHENobarbital 64.8 milliGRAM(s) Oral daily  polyethylene glycol 3350 17 Gram(s) Oral every 12 hours  senna 2 Tablet(s) Oral at bedtime  tamsulosin 0.4 milliGRAM(s) Oral at bedtime    MEDICATIONS  (PRN):  acetaminophen   Tablet .. 650 milliGRAM(s) Oral every 6 hours PRN Temp greater or equal to 38C (100.4F)  ALBUTerol    90 MICROgram(s) HFA Inhaler 2 Puff(s) Inhalation every 6 hours PRN Shortness of Breath and/or Wheezing  benzonatate 100 milliGRAM(s) Oral every 8 hours PRN Cough  ondansetron Injectable 4 milliGRAM(s) IV Push every 6 hours PRN Nausea    Home Medications:  Artificial Tears ophthalmic solution: 1 drop(s) to each affected eye 4 times a day (20 Mar 2020 21:56)  baclofen 10 mg oral tablet: 1 tab(s) by gastrostomy tube 3 times a day (20 Mar 2020 21:56)  docusate sodium 60 mg/15 mL oral syrup: 100 milligram(s) by gastrostomy tube once a day, As Needed for constipation (20 Mar 2020 21:56)  guaifenesin-dextromethorphan 100 mg-10 mg/5 mL oral liquid: 5 milliliter(s) orally every 6 hours, As needed, Cough (27 Mar 2020 11:14)  Oysco 500 (1250 mg calcium carbonate) oral tablet: 1 tab(s) by gastrostomy tube 2 times a day (20 Mar 2020 21:56)  PHENobarbital 64.8 mg oral tablet: 1 tab(s) orally once a day via G tube (20 Mar 2020 21:56)  Ventolin HFA 90 mcg/inh inhalation aerosol: 2 puff(s) inhaled 4 times a day, As Needed (20 Mar 2020 21:56)    Vital Signs Last 24 Hrs  T(C): 36 (27 May 2020 13:36), Max: 36.9 (27 May 2020 05:29)  T(F): 96.8 (27 May 2020 13:36), Max: 98.4 (27 May 2020 05:29)  HR: 77 (27 May 2020 13:36) (68 - 96)  BP: 100/53 (27 May 2020 13:36) (99/53 - 117/71)  BP(mean): --  RR: 17 (27 May 2020 13:36) (16 - 19)  SpO2: 96% (26 May 2020 17:00) (96% - 98%)  CAPILLARY BLOOD GLUCOSE        LABS:                        10.7   5.85  )-----------( 222      ( 27 May 2020 04:45 )             32.4     05-27    139  |  106  |  12  ----------------------------<  95  4.3   |  25  |  <0.5<L>    Ca    8.0<L>      27 May 2020 04:45  Mg     2.1     05-26    TPro  5.6<L>  /  Alb  2.7<L>  /  TBili  <0.2  /  DBili  x   /  AST  37  /  ALT  39  /  AlkPhos  75  05-27    LIVER FUNCTIONS - ( 27 May 2020 04:45 )  Alb: 2.7 g/dL / Pro: 5.6 g/dL / ALK PHOS: 75 U/L / ALT: 39 U/L / AST: 37 U/L / GGT: x               PT/INR - ( 26 May 2020 05:56 )   PT: 12.20 sec;   INR: 1.06 ratio         PTT - ( 26 May 2020 05:56 )  PTT:36.6 sec            Culture - Blood (collected 25 May 2020 07:10)  Source: .Blood None  Preliminary Report (26 May 2020 12:01):    No growth to date.      Consultant Notes Reviewed:  [x ] YES  [ ] NO  Care Discussed with Consultants/Other Providers/ Housestaff [ x] YES  [ ] NO  Radiology, labs, new studies personally reviewed.

## 2020-05-27 NOTE — PHYSICAL THERAPY INITIAL EVALUATION ADULT - GENERAL OBSERVATIONS, REHAB EVAL
Patient encountered lying in bed, BLE in extension contracture and BUE in flexion contracture, (+) peg tube feeding, (+) suprapubic catheter

## 2020-05-27 NOTE — PROGRESS NOTE ADULT - ASSESSMENT
· Assessment		  65 y/o male with PMH of cerebral palsy, seizure disorder, spastic paraplegia, s/p PEG tube, Asthma, H/O nephrolithiasis, BPH with bladder neck stricture s/p suprapubic catheter, and H/O Pseudomonas and ESBL Proteus mirabilis bacteruria who presented from a group with abdominal pain x1 days found to have R sided nephrolithiasis with hydronephrosis. Pt also has recent URI symptoms r/o COVID.     IMPRESSION;   Resolved sepsis secondary to acute Right pyelonephritis with right hydronephrosis with right ureteral stone  S/p R PCN by IVR 5/20  IVR Procedure: 5/26   Over-the-wire right nephrostogram and removal of right nephroureteral catheter  BCx 5/19 CoNS ( not a true pathogen)  BCx 5/20  P mirabilis ESBl  BCx 5/22 NG  UCx 5/19 P mirabilis ESBL  CT A/P - right hydronephrosis with a 9 x 7 x 10 mm proximal right ureteral calculus and ureteral enhancement   PROCEDURES:  Nephrostogram 26-May-2020 15:34:10 right Bonnie Cooper  Replacement of external ureteral stent 26-May-2020 15:34:02 right Bonnie Cooper  Antegrade ureteroscopy 26-May-2020 15:33:44 right Bonnie Cooper  Percutaneous removal of calculus of kidney, 2 cm or more in diameter 26-May-    RECOMMENDATIONS;  home on ertapenem 1 gm iv q24h for 14 days starting 5/26

## 2020-05-27 NOTE — CONSULT NOTE ADULT - ASSESSMENT
IMPRESSION: Rehab of debilitation / paraplegia    PRECAUTIONS: [  ] Cardiac  [  ] Respiratory  [  ] Seizures [  ] Contact Isolation  [  ] Droplet Isolation  [  ] Other    Weight Bearing Status:     RECOMMENDATION:    Out of Bed to Chair     DVT/Decubiti Prophylaxis    REHAB PLAN:     [x   ] Bedside P/T 3-5 times a week   [   ]   Bedside O/T  2-3 times a week             [   ] No Rehab Therapy Indicated                   [   ]  Speech Therapy   Conditioning/ROM                                    ADL  Bed Mobility                                               Conditioning/ROM  Transfers                                                     Bed Mobility  Sitting /Standing Balance                         Transfers                                        Gait Training                                               Sitting/Standing Balance  Stair Training [   ]Applicable                    Home equipment Eval                                                                        Splinting  [   ] Only      GOALS:   ADL   [   ]   Independent                    Transfers  [   ] Independent                          Ambulation  [   ] Independent     [    ] With device                            [   ]  CG                                                         [   ]  CG                                                                  [   ] CG                            [    ] Min A                                                   [ x  ] Min A                                                              [   ] Min  A          DISCHARGE PLAN:   [   ]  Good candidate for Intensive Rehabilitation/Hospital based                                             Will tolerate 3hrs Intensive Rehab Daily                                       [  x  ]  Short Term Rehab in Skilled Nursing Facility / snf                                        [    ]  Home with Outpatient or  services                                         [    ]  Possible Candidate for Intensive Hospital based Rehab

## 2020-05-27 NOTE — PHYSICAL THERAPY INITIAL EVALUATION ADULT - ADDITIONAL COMMENTS
Patient lives in group home. Dependent in bed mobility and transfers. Non-ambulatory. As per patient 2 attendants lifts him up to the wheelchair.

## 2020-05-27 NOTE — PROGRESS NOTE ADULT - SUBJECTIVE AND OBJECTIVE BOX
Progress Note  POD # 1    Subjective Pt seen and examined with Dr. Timmons      65 y/o Male s/p right PCNL. Pt is doing better in NAD  Pt went to IR for ante grade nephrostogram. All his tube were removed    [x] a 10 point review of systems was negative except where noted    [  ]  Due to altered mental status/intubation, subjective information was not able t be obtained from the patient.  History was obtained, to the extent possible, from review of the chart and collateral sources of information.    Vital signs  T(C): , Max: 36.9 (05-27-20 @ 05:29)  HR: 68 (05-27-20 @ 05:29)  BP: 99/53 (05-27-20 @ 05:29)  SpO2: 96% (05-26-20 @ 17:00)    Gen NC/AT in NAD  SKIN warm, dry with good tugor  Neck supple, FROM  LUNGS No dyspnea, No accessory muscle use  BACK No CVAT urostomy bag in place scat drainage blood tinged  ABD    Soft non tender, bladder not palpable    SP tube drainng clear yellow urine.      Labs                        10.7   5.85  )-----------( 222      ( 27 May 2020 04:45 )             32.4     27 May 2020 04:45    139    |  106    |  12     ----------------------------<  95     4.3     |  25     |  <0.5     Ca    8.0        27 May 2020 04:45  Mg     2.1       26 May 2020 05:56

## 2020-05-27 NOTE — PROGRESS NOTE ADULT - ASSESSMENT
63 y/o Male s/p right PCNL. Pt is doing better in NAD  Pt went to IR for ante grade nephrostogram. All his tube were removed    A) Stable POD # 1  s/p right PCNL    P) continue present care  Pt urologically cleared for d/c in am 5/28  OP f/u with Dr. Cooper in 2 weeks at [address] [place] [address]  [phone]

## 2020-05-27 NOTE — PROGRESS NOTE ADULT - SUBJECTIVE AND OBJECTIVE BOX
HAWATISH WAYNE  64y, Male    All available historical data reviewed    OVERNIGHT EVENTS:  no fevers  IVR Procedure: 5/26   Over-the-wire right nephrostogram and removal of right nephroureteral catheter  PROCEDURES:  Nephrostogram 26-May-2020 15:34:10 right Bonnie Cooper  Replacement of external ureteral stent 26-May-2020 15:34:02 right Bonnie Cooper  Antegrade ureteroscopy 26-May-2020 15:33:44 right Bonnie Cooper  Percutaneous removal of calculus of kidney, 2 cm or more in diameter 26-May-    ROS:  General: Denies rigors, nightsweats  HEENT: Denies headache, rhinorrhea, sore throat, eye pain  CV: Denies CP, palpitations  PULM: Denies wheezing, hemoptysis  GI: Denies hematemesis, hematochezia, melena  : Denies discharge, hematuria  MSK: Denies arthralgias, myalgias  SKIN: Denies rash, lesions  NEURO: Denies paresthesias, weakness  PSYCH: Denies depression, anxiety    VITALS:  T(F): 98.4, Max: 98.4 (05-27-20 @ 05:29)  HR: 68  BP: 99/53  RR: 18Vital Signs Last 24 Hrs  T(C): 36.9 (27 May 2020 05:29), Max: 36.9 (27 May 2020 05:29)  T(F): 98.4 (27 May 2020 05:29), Max: 98.4 (27 May 2020 05:29)  HR: 68 (27 May 2020 05:29) (68 - 96)  BP: 99/53 (27 May 2020 05:29) (96/54 - 117/71)  BP(mean): --  RR: 18 (27 May 2020 05:29) (16 - 22)  SpO2: 96% (26 May 2020 17:00) (95% - 98%)    TESTS & MEASUREMENTS:                        10.7   5.85  )-----------( 222      ( 27 May 2020 04:45 )             32.4     05-27    139  |  106  |  12  ----------------------------<  95  4.3   |  25  |  <0.5<L>    Ca    8.0<L>      27 May 2020 04:45  Mg     2.1     05-26    TPro  5.6<L>  /  Alb  2.7<L>  /  TBili  <0.2  /  DBili  x   /  AST  37  /  ALT  39  /  AlkPhos  75  05-27    LIVER FUNCTIONS - ( 27 May 2020 04:45 )  Alb: 2.7 g/dL / Pro: 5.6 g/dL / ALK PHOS: 75 U/L / ALT: 39 U/L / AST: 37 U/L / GGT: x             Culture - Blood (collected 05-25-20 @ 07:10)  Source: .Blood None  Preliminary Report (05-26-20 @ 12:01):    No growth to date.    Culture - Blood (collected 05-22-20 @ 06:19)  Source: .Blood None  Final Report (05-27-20 @ 12:00):    No Growth Final    Culture - Blood (collected 05-20-20 @ 16:20)  Source: .Blood Blood-Venous  Gram Stain (05-23-20 @ 08:44):    Growth in anaerobic bottle: Gram Negative Rods    Growth in aerobic bottle: Gram Negative Rods  Final Report (05-24-20 @ 14:39):    Growth in aerobic and anaerobic bottles: Proteus mirabilis ESBL    "Due to technical problems, Proteus sp. will Not be reported as part of    the BCID panel until further notice"    ***Blood Panel PCR results on this specimen are available    approximately3 hours after the Gram stain result.***    Gram stain, PCR, and/or culture results may not always    correspond due to difference in methodologies.    ************************************************************    This PCR assay was performed using [employer].    The following targets are tested for: Enterococcus,    vancomycin resistant enterococci, Listeria monocytogenes,    coagulase negative staphylococci, S. aureus,    methicillin resistant S. aureus, Streptococcus agalactiae    (Group B), S. pneumoniae, S. pyogenes (Group A),    Acinetobacter baumannii, Enterobacter cloacae, E. coli,    Klebsiella oxytoca, K. pneumoniae, Proteus sp.,    Serratia marcescens, Haemophilus influenzae,    Neisseria meningitidis, Pseudomonas aeruginosa, Candida    albicans, C. glabrata, C krusei, C parapsilosis,    C. tropicalis and the KPC resistance gene.  Organism: Blood Culture PCR  Proteus mirabilis ESBL (05-24-20 @ 14:39)  Organism: Proteus mirabilis ESBL (05-24-20 @ 14:39)      -  Amikacin: S <=16      -  Ampicillin: R >16 These ampicillin results predict results for amoxicillin      -  Ampicillin/Sulbactam: R 8/4 Enterobacter, Citrobacter, and Serratia may develop resistance during prolonged therapy (3-4 days)      -  Aztreonam: R 16      -  Cefazolin: R >16 Enterobacter, Citrobacter, and Serratia may develop resistance during prolonged therapy (3-4 days)      -  Cefepime: R >16      -  Cefoxitin: S <=8      -  Ceftriaxone: R >32 Enterobacter, Citrobacter, and Serratia may develop resistance during prolonged therapy      -  Ciprofloxacin: R >2      -  Ertapenem: S <=0.5      -  Gentamicin: S <=2      -  Levofloxacin: R >4      -  Meropenem: S <=1      -  Piperacillin/Tazobactam: R <=8      -  Tobramycin: S <=2      -  Trimethoprim/Sulfamethoxazole: R >2/38      Method Type: MIGUEL A  Organism: Blood Culture PCR (05-24-20 @ 14:39)      -  Acinetobacter baumanii: Nondet      -  Candida albicans: Nondet      -  Candida glabrata: Nondet      -  Candida krusei: Nondet      -  Candida parapsilosis: Nondet      -  Candida tropicalis: Nondet      -  Coagulase negative Staphylococcus: Nondet      -  Enterobacter cloacae complex: Nondet      -  Enterococcus species: Nondet      -  Escherichia coli: Nondet      -  Haemophilus influenzae: Nondet      -  Klebsiella oxytoca: Nondet      -  Klebsiella pneumoniae: Nondet      -  Listeria monocytogenes: Nondet      -  Methicillin resistant Staphylococcus aureus (MRSA): Nondet      -  Multidrug (KPC pos) resistant organism: Nondet      -  Neisseria meningitidis: Nondet      -  Pseudomonas aeruginosa: Nondet      -  Serratia marcescens: Nondet      -  Staphylococcus aureus: Nondet      -  Streptococcus agalactiae (Group B): Nondet      -  Streptococcus pneumoniae: Nondet      -  Streptococcus pyogenes (Group A): Nondet      -  Streptococcus sp. (Not Grp A, B or S pneumoniae): Nondet      -  Vancomycin resistant Enterococcus sp.: Nondet      Method Type: PCR    Culture - Urine (collected 05-20-20 @ 13:55)  Source: .Urine None  Final Report (05-23-20 @ 16:37):    Moderate Proteus mirabilis ESBL    Moderate Staphylococcus aureus  Organism: Proteus mirabilis ESBL  Staphylococcus aureus (05-23-20 @ 16:37)  Organism: Staphylococcus aureus (05-23-20 @ 16:37)      -  Ampicillin/Sulbactam: S <=8/4      -  Cefazolin: S <=4      -  Gentamicin: S <=1 Should not be used as monotherapy      -  Oxacillin: S 0.5      -  Penicillin: R >8      -  RIF- Rifampin: S <=1 Should not be used as monotherapy      -  Tetra/Doxy: S <=1      -  Trimethoprim/Sulfamethoxazole: S <=0.5/9.5      -  Vancomycin: S 2      Method Type: MIGUEL A  Organism: Proteus mirabilis ESBL (05-23-20 @ 16:37)      -  Amikacin: S <=16      -  Ampicillin: R >16 These ampicillin results predict results for amoxicillin      -  Ampicillin/Sulbactam: R <=8/4 Enterobacter, Citrobacter, and Serratia may develop resistance during prolonged therapy (3-4 days)      -  Aztreonam: R <=4      -  Cefazolin: R >16 ([id number]) For uncomplicated UTI with K. pneumoniae, E. coli, or P. mirablis: MIGUEL A <=16 is sensitive and MIGUEL A >=32 is resistant. This also predicts results for oral agents cefaclor, cefdinir, cefpodoxime, cefprozil, cefuroxime axetil, cephalexin and locarbef for uncomplicated UTI. Note that some isolates may be susceptible to these agents while testing resistant to cefazolin.      -  Cefepime: R 8      -  Cefoxitin: S <=8      -  Ceftriaxone: R 32 Enterobacter, Citrobacter, and Serratia may develop resistance during prolonged therapy      -  Ciprofloxacin: R >2      -  Ertapenem: S <=1      -  Gentamicin: S <=4      -  Levofloxacin: R >4      -  Meropenem: S <=1      -  Nitrofurantoin: R >64 Should not be used to treat pyelonephritis      -  Piperacillin/Tazobactam: R <=16      -  Tobramycin: S <=4      -  Trimethoprim/Sulfamethoxazole: R >2/38      Method Type: [facility]            RADIOLOGY & ADDITIONAL TESTS:  Personal review of radiological diagnostics performed  Echo and EKG results noted when applicable.     MEDICATIONS:  acetaminophen   Tablet .. 650 milliGRAM(s) Oral every 6 hours PRN  ALBUTerol    90 MICROgram(s) HFA Inhaler 2 Puff(s) Inhalation every 6 hours PRN  baclofen 10 milliGRAM(s) Oral three times a day  benzonatate 100 milliGRAM(s) Oral every 8 hours PRN  calcium carbonate   1250 mG (OsCal) 1 Tablet(s) Oral two times a day  calcium carbonate   1250 mG (OsCal) 1 Tablet(s) Oral daily  dicyclomine 10 milliGRAM(s) Oral daily  magnesium oxide 400 milliGRAM(s) Oral three times a day with meals  meropenem  IVPB      meropenem  IVPB 1000 milliGRAM(s) IV Intermittent every 8 hours  ondansetron Injectable 4 milliGRAM(s) IV Push every 6 hours PRN  PHENobarbital 64.8 milliGRAM(s) Oral daily  polyethylene glycol 3350 17 Gram(s) Oral every 12 hours  senna 2 Tablet(s) Oral at bedtime  tamsulosin 0.4 milliGRAM(s) Oral at bedtime      ANTIBIOTICS:  meropenem  IVPB      meropenem  IVPB 1000 milliGRAM(s) IV Intermittent every 8 hours

## 2020-05-27 NOTE — PHYSICAL THERAPY INITIAL EVALUATION ADULT - PLANNED THERAPY INTERVENTIONS, PT EVAL
No PT intervention needed. Patient has been depednent in bed mobility and transfers, non-ambulatory, bed bound, wheelchair bound.

## 2020-05-27 NOTE — PROGRESS NOTE ADULT - SUBJECTIVE AND OBJECTIVE BOX
INTERVENTIONAL RADIOLOGY BRIEF-OPERATIVE NOTE    Procedure: Right over the wire nephrostogram and removal of nephroureteral catheter    Pre-Op Diagnosis: Right PCNU     Post-Op Diagnosis: same     Attending: Temo  Resident: Ping    Anesthesia (type):  [ ] General Anesthesia  [ ] Sedation  [ ] Spinal Anesthesia  [ ] Local/Regional    Contrast: 10cc    Estimated Blood Loss: 0    Condition:   [ ] Critical  [ ] Serious  [ ] Fair   [x] Good    Findings/Follow up Plan of Care:  radiograph demonstrated right PCNU in proper placement. Over the wire nephrostogram demonstrated contrast filling of the collecting system, ureter, and urinary bladder without obstruction. Wire was removed.     Specimens Removed: none    Implants: none    Complications: none    Disposition:  Successful removal of right PCNU. Stable for d/c back to floor.     Please call Interventional Radiology [phone] with any questions, concerns, or issues. INTERVENTIONAL RADIOLOGY BRIEF-OPERATIVE NOTE    Procedure:  Over-the-wire right nephrostogram and removal of right nephroureteral catheter    Pre-Op Diagnosis:  Obstructive right uropathy     Post-Op Diagnosis:  No obstruction seen    Attending:  Temo  Resident:  Ping    Anesthesia (type):  [ ] General Anesthesia  [ ] Sedation  [ ] Spinal Anesthesia  [ ] Local/Regional    Contrast:  10cc    Estimated Blood Loss:  0 cc    Condition:   [ ] Critical  [ ] Serious  [ ] Fair   [x] Good    Findings/Follow up Plan of Care:  Previously documented right renal calculi no longer seen.  Contrast drains promptly from right renal collecting system to the urinary bladder, unobstructed.  Catheter D/C'd uneventfully.  Findings d/w patient's sister, Leslie, via telephone immediately, post-procedure with understanding.    Specimens Removed:  None    Implants:  None    Complications:  None immediate.    Disposition:  Transfer back to floor;  change right flank dressings as needed.     Please call Interventional Radiology [phone] with any questions, concerns, or issues.

## 2020-05-27 NOTE — PROGRESS NOTE ADULT - ASSESSMENT
Patient is a 63 y/o male with PMH of cerebral palsy, seizure disorder, spastic paraplegia, s/p PEG tube, Asthma, H/O nephrolithiasis, BPH with bladder neck stricture s/p suprapubic catheter, and H/O Pseudomonas and ESBL Proteus mirabilis bacteruria who presented from a group with abdominal pain x1 days.       #Sepsis due to UTI (resolving) 2/2 pyelonephritis with hydronephrosis and ureter stones with proteus ESBL bacteruria and bacteremia   - s/p R PCT nephrostomy with stent on 5/20  - s/p Percutaneous nephrolithotomy (PCNL) for stone, stent removal  - c/w Meropenem (proteus ESBL bacteruria and bacteremia), Ertapenem on d/c       #lactic acidosis: (reolved) possibly seizure after nephrostomy  with subtherapeutic phenobarb level  - phenobarb level is low, REEG  no seizure, just slowing, as per Neuro (spoke with Dr. Shipley), no dose adjustment needed,   - lactate level normalized    #normocytic anemia: Hbg stable, likely dilutional, no signs of blood loss, monitor Hbg and keep active type and screening     #Abd distention: resolved   - PEG tube feeding  - bowel regimen     # URI symptoms w/ COVID contact in last 2 weeks -COVID PCR negative on 5/19, has lymphopenia looks chronic     # Seizure disorder - Continue with phenobarbital 64.8mg PO through PEG tube QD (current level is ok as per neuro)    # Cerebral palsy w/ Spastic quadriplegia - Continue with baclofen 10mg PO three times a day, c/w PEG feeds     # Functional quadriplegia - full care    # Asthma - No wheezing on exam, on RA, Albuterol PRN     # BPH with bladder neck stricture s/p suprapubic catheter - Tube replaced day before admission, Chronic colonization w/ ESBL Proteus Mirabilis, grew Pseudomonas on last admission,    #DVT Prophylaxis: Lovenox 40mg SQ once daily,     #GI Prophylaxis: Not indicated     #Diet: through PEG tube feeding. NPO     Activity: bed rest     Dispo: senior living, , Leslie ([phone]) is the sister and health care proxy, Mitch is the nurse at retirement [phone],   Code Status: Full Code    #Progress Note Handoff  Pending (specify): ABx to be arranged  Pt/Family discussion: Pt informed and agrees with the current plan  Disposition: Home__x____SNF_______To be determined________ .

## 2020-05-27 NOTE — PROGRESS NOTE ADULT - ASSESSMENT
Patient is a 65 y/o male with PMH of cerebral palsy, seizure disorder, spastic paraplegia, s/p PEG tube, Asthma, H/O nephrolithiasis, BPH with bladder neck stricture s/p suprapubic catheter, and H/O Pseudomonas and ESBL Proteus mirabilis bacteruria who presented from a group with abdominal pain x1 days.       #urosepsis (resolving) 2/2 pyelonephritis with hydronephrosis and ureter stones with proteus ESBL bacteruria and bacteremia   - s/p R PCT nephrostomy with stent on 5/20, and Percutaneous nephrolithotomy (PCNL) 5/26,   - pending CT Stentogram of abd/pelvis and Urology f/u  - Pain control with Toradol PRN, monitor renal function   - IVF, Tamsulosin and Bentyl to facilitate passage   - c/w Meropenem (proteus ESBL bacteruria and bacteremia), repeat Bcx negative on 5/22, needs Midline and Ertapenem to complete 14 days starting from 5/26        #lactic acidosis: (reolved) possibly seizure after nephrostomy  with subtherapeutic phenobarb level  - phenobarb level is low, REEG  no seizure, just slowing, as per Neuro (spoke with Dr. Shipley), no dose adjustment needed,   - lactate level normalized    #acute normocytic anemia: Hbg stable, likely dilutional, no signs of blood loss, monitor Hbg and keep active type and screening     #Abd distention: resolved, normal KUB  - PEG tube feeding  - bowel regimen     # URI symptoms w/ COVID contact in last 2 weeks -COVID PCR negative on 5/19, has lymphopenia looks chronic     # chronic Cough is unlikely from [place] but non COVID bronchitis     # Seizure disorder - Continue with phenobarbital 64.8mg PO through PEG tube QD    # Cerebral palsy w/ Spastic quadriplegia - Continue with baclofen 10mg PO three times a day, c/w PEG feeds     # Functional quadriplegia - full care    # Asthma - No wheezing on exam, on RA, Albuterol PRN     # BPH with bladder neck stricture s/p suprapubic catheter - Tube replaced day before admission, Chronic colonization w/ ESBL Proteus Mirabilis, grew Pseudomonas on last admission,    #DVT Prophylaxis: Lovenox 40mg SQ once daily,     #GI Prophylaxis: Not indicated     #Diet: through PEG tube feeding    Activity: bed rest     Dispo: intermediate, , Leslie ([phone]) is the sister and health care proxy, Mitch is the nurse at [employer] [phone],   Code Status: Full Code  Dispo: TND likely to NH, Midline and urology recom Patient is a 63 y/o male with PMH of cerebral palsy, seizure disorder, spastic paraplegia, s/p PEG tube, Asthma, H/O nephrolithiasis, BPH with bladder neck stricture s/p suprapubic catheter, and H/O Pseudomonas and ESBL Proteus mirabilis bacteruria who presented from a group with abdominal pain x1 days.       #urosepsis (resolving) 2/2 pyelonephritis with hydronephrosis and ureter stones with proteus ESBL bacteruria and bacteremia   - s/p R PCT nephrostomy with stent on 5/20, and Percutaneous nephrolithotomy (PCNL) 5/26,   - pending CT Stentogram of abd/pelvis and Urology f/u  - Pain control with Toradol PRN, monitor renal function   - IVF, Tamsulosin and Bentyl to facilitate passage   - c/w Meropenem (proteus ESBL bacteruria and bacteremia), repeat Bcx negative on 5/22, needs Midline and Ertapenem to complete 14 days ends on 5/8/2020        #lactic acidosis: (reolved) possibly seizure after nephrostomy  with subtherapeutic phenobarb level  - phenobarb level is low, REEG  no seizure, just slowing, as per Neuro (spoke with Dr. Shipley), no dose adjustment needed,   - lactate level normalized    #acute normocytic anemia: Hbg stable, likely dilutional, no signs of blood loss, monitor Hbg and keep active type and screening     #Abd distention: resolved, normal KUB  - PEG tube feeding  - bowel regimen     # URI symptoms w/ COVID contact in last 2 weeks -COVID PCR negative on 5/19, has lymphopenia looks chronic     # chronic Cough is unlikely from [place] but non COVID bronchitis     # Seizure disorder - Continue with phenobarbital 64.8mg PO through PEG tube QD    # Cerebral palsy w/ Spastic quadriplegia - Continue with baclofen 10mg PO three times a day, c/w PEG feeds     # Functional quadriplegia - full care    # Asthma - No wheezing on exam, on RA, Albuterol PRN     # BPH with bladder neck stricture s/p suprapubic catheter - Tube replaced day before admission, Chronic colonization w/ ESBL Proteus Mirabilis, grew Pseudomonas on last admission,    #DVT Prophylaxis: Lovenox 40mg SQ once daily,     #GI Prophylaxis: Not indicated     #Diet: through PEG tube feeding    Activity: bed rest     Dispo: care home, , Leslie ([phone]) is the sister and health care proxy, Mitch is the nurse at long term [phone],   Code Status: Full Code  Dispo: TBD likely to NH, Midline and urology recom Patient is a 63 y/o male with PMH of cerebral palsy, seizure disorder, spastic paraplegia, s/p PEG tube, Asthma, H/O nephrolithiasis, BPH with bladder neck stricture s/p suprapubic catheter, and H/O Pseudomonas and ESBL Proteus mirabilis bacteruria who presented from a group with abdominal pain x1 days.       #urosepsis (resolving) 2/2 pyelonephritis with hydronephrosis and ureter stones with proteus ESBL bacteruria and bacteremia   - s/p R PCT nephrostomy with stent on 5/20, and Percutaneous nephrolithotomy (PCNL) 5/26,   - pending CT Stentogram of abd/pelvis and Urology f/u  - Pain control with Toradol PRN, monitor renal function   - IVF, Tamsulosin and Bentyl to facilitate passage   - c/w Meropenem (proteus ESBL bacteruria and bacteremia), repeat Bcx negative on 5/22, needs Midline and Ertapenem to complete 14 days ends on 6/8/2020        #lactic acidosis: (reolved) possibly seizure after nephrostomy  with subtherapeutic phenobarb level  - phenobarb level is low, REEG  no seizure, just slowing, as per Neuro (spoke with Dr. Shipley), no dose adjustment needed,   - lactate level normalized    #acute normocytic anemia: Hbg stable, likely dilutional, no signs of blood loss, monitor Hbg and keep active type and screening     #Abd distention: resolved, normal KUB  - PEG tube feeding  - bowel regimen     # URI symptoms w/ COVID contact in last 2 weeks -COVID PCR negative on 5/19, has lymphopenia looks chronic     # chronic Cough is unlikely from [place] but non COVID bronchitis     # Seizure disorder - Continue with phenobarbital 64.8mg PO through PEG tube QD    # Cerebral palsy w/ Spastic quadriplegia - Continue with baclofen 10mg PO three times a day, c/w PEG feeds     # Functional quadriplegia - full care    # Asthma - No wheezing on exam, on RA, Albuterol PRN     # BPH with bladder neck stricture s/p suprapubic catheter - Tube replaced day before admission, Chronic colonization w/ ESBL Proteus Mirabilis, grew Pseudomonas on last admission,    #DVT Prophylaxis: Lovenox 40mg SQ once daily,     #GI Prophylaxis: Not indicated     #Diet: through PEG tube feeding    Activity: bed rest     Dispo: care home, , Leslie ([phone]) is the sister and health care proxy, Mitch is the nurse at [employer]-way [phone],   Code Status: Full Code  Dispo: TBD likely to NH, Midline and urology recom Patient is a 63 y/o male with PMH of cerebral palsy, seizure disorder, spastic paraplegia, s/p PEG tube, Asthma, H/O nephrolithiasis, BPH with bladder neck stricture s/p suprapubic catheter, and H/O Pseudomonas and ESBL Proteus mirabilis bacteruria who presented from a group with abdominal pain x1 days.       #urosepsis (resolving) 2/2 pyelonephritis with hydronephrosis and ureter stones with proteus ESBL bacteruria and bacteremia   - s/p R PCT nephrostomy with stent on 5/20, and Percutaneous nephrolithotomy (PCNL) 5/26,   - pending CT Stentogram of abd/pelvis and Urology f/u  - Pain control with Toradol PRN, monitor renal function   - IVF, Tamsulosin and Bentyl to facilitate passage   - c/w Meropenem (proteus ESBL bacteruria and bacteremia), repeat Bcx negative on 5/22, needs Midline and Ertapenem 1 gram QD to complete 14 days ends on 6/8/2020        #lactic acidosis: (reolved) possibly seizure after nephrostomy  with subtherapeutic phenobarb level  - phenobarb level is low, REEG  no seizure, just slowing, as per Neuro (spoke with Dr. Shipley), no dose adjustment needed,   - lactate level normalized    #acute normocytic anemia: Hbg stable, likely dilutional, no signs of blood loss, monitor Hbg and keep active type and screening     #Abd distention: resolved, normal KUB  - PEG tube feeding  - bowel regimen     # URI symptoms w/ COVID contact in last 2 weeks -COVID PCR negative on 5/19, has lymphopenia looks chronic     # chronic Cough is unlikely from [place] but non COVID bronchitis     # Seizure disorder - Continue with phenobarbital 64.8mg PO through PEG tube QD    # Cerebral palsy w/ Spastic quadriplegia - Continue with baclofen 10mg PO three times a day, c/w PEG feeds     # Functional quadriplegia - full care    # Asthma - No wheezing on exam, on RA, Albuterol PRN     # BPH with bladder neck stricture s/p suprapubic catheter - Tube replaced day before admission, Chronic colonization w/ ESBL Proteus Mirabilis, grew Pseudomonas on last admission,    #DVT Prophylaxis: Lovenox 40mg SQ once daily,     #GI Prophylaxis: Not indicated     #Diet: through PEG tube feeding    Activity: bed rest     Dispo: retirement, , Leslie ([phone]) is the sister and health care proxy, Mitch is the nurse at [facility] [phone],   Code Status: Full Code  Dispo: TBD likely to NH, Midline and urology recom

## 2020-05-27 NOTE — CONSULT NOTE ADULT - SUBJECTIVE AND OBJECTIVE BOX
HPI:  Patient is a 63 y/o male with PMH of cerebral palsy, seizure disorder, spastic paraplegia, s/p PEG tube, Asthma, H/O nephrolithiasis, BPH with bladder neck stricture s/p suprapubic catheter, and H/O Pseudomonas and ESBL Proteus mirabilis bacteruria who presented from a group with abdominal pain x1 days. Pt states that he started getting a R sided flank pain with radiation to his pelvis after getting his suprapubic cathter tube changes. Pt states that the pain is sharp and constant. Pt also endorses some nausea and vomited x1 in the AM. He also has some pain at the catheter insertion site however catheter has been draining well. Off note pt has a progressive worsening congestion for the last few days. As per aid, pts group home had a resident test postive for COVID. Pt was likely in close contact with resident. Pt also endorses recent subjective fevers. He has had a cough for the last month, pt was recently admitted to the ICU for non-COVID coronavirus bronchitis, never intubated. Denies any other complaints. Denies any chills, changes in weight, chest pain, SOB, diarrhea, myalgias, arthralgias syncope, fatigue.   In the ED, /67  RR 18 SpO2 94 on RA afebrile. CT A/P showed a [id number] proximal right ureteral stone with mod hydronephrosis. Urolgoy was consulted. Pt was given IVF, pain control and Merox1. Swabbed for COVID. (19 May 2020 08:44)      PAST MEDICAL & SURGICAL HISTORY:  Asthma  Urinary retention  Urinary calculi  Spastic quadriplegia  Osteoporosis  Seizure: last seizure &gt;10 years ago  Cerebral palsy  BPH (benign prostatic hyperplasia)  Suprapubic catheter  H/O cystoscopy  S/P percutaneous endoscopic gastrostomy (PEG) tube placement    Hospital Course: s/p removal of nephroureteral  catheter    MEDICATIONS  (STANDING):  baclofen 10 milliGRAM(s) Oral three times a day  calcium carbonate   1250 mG (OsCal) 1 Tablet(s) Oral two times a day  calcium carbonate   1250 mG (OsCal) 1 Tablet(s) Oral daily  dicyclomine 10 milliGRAM(s) Oral daily  magnesium oxide 400 milliGRAM(s) Oral three times a day with meals  meropenem  IVPB      meropenem  IVPB 1000 milliGRAM(s) IV Intermittent every 8 hours  PHENobarbital 64.8 milliGRAM(s) Oral daily  polyethylene glycol 3350 17 Gram(s) Oral every 12 hours  senna 2 Tablet(s) Oral at bedtime  tamsulosin 0.4 milliGRAM(s) Oral at bedtime    MEDICATIONS  (PRN):  acetaminophen   Tablet .. 650 milliGRAM(s) Oral every 6 hours PRN Temp greater or equal to 38C (100.4F)  ALBUTerol    90 MICROgram(s) HFA Inhaler 2 Puff(s) Inhalation every 6 hours PRN Shortness of Breath and/or Wheezing  benzonatate 100 milliGRAM(s) Oral every 8 hours PRN Cough  ondansetron Injectable 4 milliGRAM(s) IV Push every 6 hours PRN Nausea      FAMILY HISTORY:  Family history unknown      Allergies    No Known Allergies    Intolerances        SOCIAL HISTORY:    [  ] Etoh  [  ] Smoking  [  ] Substance abuse     Home Environment:  [  ] Home Alone  [  ] Lives with Family  [  ] Home Health Aid    Dwelling:  [  ] Apartment  [  ] Private House  [  ] Adult Home  [ x ] Skilled Nursing Facility      [  ] Short Term  [  ] Long Term  [  ] Stairs       Elevator [  ]    FUNCTIONAL STATUS PTA: (Check all that apply)  Ambulation: [   ]Independent    [  ] Dependent     [  ] Non-Ambulatory  Assistive Device: [  ] SA Cane  [  ]  Q Cane  [  ] Walker  [  ]  Wheelchair  ADL : [  ] Independent  [x  ]  Dependent       Vital Signs Last 24 Hrs  T(C): 36.9 (27 May 2020 05:29), Max: 36.9 (27 May 2020 05:29)  T(F): 98.4 (27 May 2020 05:29), Max: 98.4 (27 May 2020 05:29)  HR: 68 (27 May 2020 05:29) (68 - 96)  BP: 99/53 (27 May 2020 05:29) (96/54 - 117/71)  BP(mean): --  RR: 18 (27 May 2020 05:29) (16 - 22)  SpO2: 96% (26 May 2020 17:00) (95% - 98%)      PHYSICAL EXAM: non verbal  GENERAL: NAD  HEAD:  Atraumatic, Normocephalic  CHEST/LUNG: Clear   HEART: S1S2+  ABDOMEN: Soft, Nontender  EXTREMITIES:  no calf tenderness    NERVOUS SYSTEM:  Cranial Nerves 2-12 intact [  ] Abnormal  [  ]  ROM: WFL all extremities [  ]  Abnormal [x  ]able to move upper ext / multiple joint contractures / spasticity  Motor Strength: WFL all extremities  [  ]  Abnormal [x  ]  Sensation: intact to light touch [  ] Abnormal [  ]  Reflexes: Symmetric [  ]  Abnormal [  ]    FUNCTIONAL STATUS:  Bed Mobility: Independent [  ]  Supervision [  ]  Needs Assistance [  ]  N/A [x  ]  Transfers: Independent [  ]  Supervision [  ]  Needs Assistance [  ]  N/A [ x ]   Ambulation: Independent [  ]  Supervision [  ]  Needs Assistance [  ]  N/A [  ]  ADL: Independent [  ] Requires Assistance [  ] N/A [  ]      LABS:                        10.7   5.85  )-----------( 222      ( 27 May 2020 04:45 )             32.4     05-27    139  |  106  |  12  ----------------------------<  95  4.3   |  25  |  <0.5<L>    Ca    8.0<L>      27 May 2020 04:45  Mg     2.1     05-26    TPro  5.6<L>  /  Alb  2.7<L>  /  TBili  <0.2  /  DBili  x   /  AST  37  /  ALT  39  /  AlkPhos  75  05-27    PT/INR - ( 26 May 2020 05:56 )   PT: 12.20 sec;   INR: 1.06 ratio         PTT - ( 26 May 2020 05:56 )  PTT:36.6 sec      RADIOLOGY & ADDITIONAL STUDIES:    Assesment:

## 2020-05-28 LAB
ALBUMIN SERPL ELPH-MCNC: 3.1 G/DL — LOW (ref 3.5–5.2)
ALP SERPL-CCNC: 79 U/L — SIGNIFICANT CHANGE UP (ref 30–115)
ALT FLD-CCNC: 38 U/L — SIGNIFICANT CHANGE UP (ref 0–41)
ANION GAP SERPL CALC-SCNC: 13 MMOL/L — SIGNIFICANT CHANGE UP (ref 7–14)
AST SERPL-CCNC: 40 U/L — SIGNIFICANT CHANGE UP (ref 0–41)
BASOPHILS # BLD AUTO: 0.01 K/UL — SIGNIFICANT CHANGE UP (ref 0–0.2)
BASOPHILS NFR BLD AUTO: 0.3 % — SIGNIFICANT CHANGE UP (ref 0–1)
BILIRUB SERPL-MCNC: 0.2 MG/DL — SIGNIFICANT CHANGE UP (ref 0.2–1.2)
BUN SERPL-MCNC: 12 MG/DL — SIGNIFICANT CHANGE UP (ref 10–20)
CALCIUM SERPL-MCNC: 8.4 MG/DL — LOW (ref 8.5–10.1)
CHLORIDE SERPL-SCNC: 101 MMOL/L — SIGNIFICANT CHANGE UP (ref 98–110)
CO2 SERPL-SCNC: 25 MMOL/L — SIGNIFICANT CHANGE UP (ref 17–32)
CREAT SERPL-MCNC: 0.5 MG/DL — LOW (ref 0.7–1.5)
D DIMER BLD IA.RAPID-MCNC: 338 NG/ML DDU — HIGH (ref 0–230)
EOSINOPHIL # BLD AUTO: 0.32 K/UL — SIGNIFICANT CHANGE UP (ref 0–0.7)
EOSINOPHIL NFR BLD AUTO: 8 % — SIGNIFICANT CHANGE UP (ref 0–8)
FIBRINOGEN PPP-MCNC: 589 MG/DL — HIGH (ref 204.4–570.6)
GLUCOSE SERPL-MCNC: 89 MG/DL — SIGNIFICANT CHANGE UP (ref 70–99)
HCT VFR BLD CALC: 33.1 % — LOW (ref 42–52)
HGB BLD-MCNC: 11.2 G/DL — LOW (ref 14–18)
IMM GRANULOCYTES NFR BLD AUTO: 1.5 % — HIGH (ref 0.1–0.3)
LYMPHOCYTES # BLD AUTO: 1.23 K/UL — SIGNIFICANT CHANGE UP (ref 1.2–3.4)
LYMPHOCYTES # BLD AUTO: 30.8 % — SIGNIFICANT CHANGE UP (ref 20.5–51.1)
MAGNESIUM SERPL-MCNC: 2.3 MG/DL — SIGNIFICANT CHANGE UP (ref 1.8–2.4)
MCHC RBC-ENTMCNC: 32.2 PG — HIGH (ref 27–31)
MCHC RBC-ENTMCNC: 33.8 G/DL — SIGNIFICANT CHANGE UP (ref 32–37)
MCV RBC AUTO: 95.1 FL — HIGH (ref 80–94)
MONOCYTES # BLD AUTO: 0.5 K/UL — SIGNIFICANT CHANGE UP (ref 0.1–0.6)
MONOCYTES NFR BLD AUTO: 12.5 % — HIGH (ref 1.7–9.3)
NEUTROPHILS # BLD AUTO: 1.88 K/UL — SIGNIFICANT CHANGE UP (ref 1.4–6.5)
NEUTROPHILS NFR BLD AUTO: 46.9 % — SIGNIFICANT CHANGE UP (ref 42.2–75.2)
NRBC # BLD: 0 /100 WBCS — SIGNIFICANT CHANGE UP (ref 0–0)
PLATELET # BLD AUTO: 274 K/UL — SIGNIFICANT CHANGE UP (ref 130–400)
POTASSIUM SERPL-MCNC: 4.7 MMOL/L — SIGNIFICANT CHANGE UP (ref 3.5–5)
POTASSIUM SERPL-SCNC: 4.7 MMOL/L — SIGNIFICANT CHANGE UP (ref 3.5–5)
PROT SERPL-MCNC: 6.2 G/DL — SIGNIFICANT CHANGE UP (ref 6–8)
RBC # BLD: 3.48 M/UL — LOW (ref 4.7–6.1)
RBC # FLD: 14.6 % — HIGH (ref 11.5–14.5)
SARS-COV-2 RNA SPEC QL NAA+PROBE: DETECTED
SODIUM SERPL-SCNC: 139 MMOL/L — SIGNIFICANT CHANGE UP (ref 135–146)
SURGICAL PATHOLOGY STUDY: SIGNIFICANT CHANGE UP
TRIGL SERPL-MCNC: 154 MG/DL — HIGH (ref 10–149)
WBC # BLD: 4 K/UL — LOW (ref 4.8–10.8)
WBC # FLD AUTO: 4 K/UL — LOW (ref 4.8–10.8)

## 2020-05-28 PROCEDURE — 99232 SBSQ HOSP IP/OBS MODERATE 35: CPT

## 2020-05-28 RX ORDER — TAMSULOSIN HYDROCHLORIDE 0.4 MG/1
1 CAPSULE ORAL
Qty: 0 | Refills: 0 | DISCHARGE
Start: 2020-05-28

## 2020-05-28 RX ORDER — ENOXAPARIN SODIUM 100 MG/ML
40 INJECTION SUBCUTANEOUS DAILY
Refills: 0 | Status: DISCONTINUED | OUTPATIENT
Start: 2020-05-28 | End: 2020-06-10

## 2020-05-28 RX ORDER — SENNA PLUS 8.6 MG/1
2 TABLET ORAL
Qty: 0 | Refills: 0 | DISCHARGE
Start: 2020-05-28

## 2020-05-28 RX ORDER — MAGNESIUM OXIDE 400 MG ORAL TABLET 241.3 MG
1 TABLET ORAL
Qty: 0 | Refills: 0 | DISCHARGE
Start: 2020-05-28

## 2020-05-28 RX ORDER — ERTAPENEM SODIUM 1 G/1
1 INJECTION, POWDER, LYOPHILIZED, FOR SOLUTION INTRAMUSCULAR; INTRAVENOUS
Qty: 11 | Refills: 0
Start: 2020-05-28 | End: 2020-06-07

## 2020-05-28 RX ADMIN — TAMSULOSIN HYDROCHLORIDE 0.4 MILLIGRAM(S): 0.4 CAPSULE ORAL at 21:42

## 2020-05-28 RX ADMIN — MAGNESIUM OXIDE 400 MG ORAL TABLET 400 MILLIGRAM(S): 241.3 TABLET ORAL at 11:33

## 2020-05-28 RX ADMIN — Medication 10 MILLIGRAM(S): at 06:01

## 2020-05-28 RX ADMIN — MAGNESIUM OXIDE 400 MG ORAL TABLET 400 MILLIGRAM(S): 241.3 TABLET ORAL at 17:17

## 2020-05-28 RX ADMIN — SENNA PLUS 2 TABLET(S): 8.6 TABLET ORAL at 21:42

## 2020-05-28 RX ADMIN — Medication 1 TABLET(S): at 11:33

## 2020-05-28 RX ADMIN — Medication 1 TABLET(S): at 06:01

## 2020-05-28 RX ADMIN — MEROPENEM 100 MILLIGRAM(S): 1 INJECTION INTRAVENOUS at 21:41

## 2020-05-28 RX ADMIN — ENOXAPARIN SODIUM 40 MILLIGRAM(S): 100 INJECTION SUBCUTANEOUS at 17:17

## 2020-05-28 RX ADMIN — MEROPENEM 100 MILLIGRAM(S): 1 INJECTION INTRAVENOUS at 06:02

## 2020-05-28 RX ADMIN — Medication 64.8 MILLIGRAM(S): at 12:58

## 2020-05-28 RX ADMIN — Medication 10 MILLIGRAM(S): at 13:01

## 2020-05-28 RX ADMIN — MEROPENEM 100 MILLIGRAM(S): 1 INJECTION INTRAVENOUS at 14:39

## 2020-05-28 RX ADMIN — Medication 1 TABLET(S): at 17:17

## 2020-05-28 RX ADMIN — Medication 10 MILLIGRAM(S): at 11:33

## 2020-05-28 RX ADMIN — HEPARIN SODIUM 5000 UNIT(S): 5000 INJECTION INTRAVENOUS; SUBCUTANEOUS at 06:00

## 2020-05-28 RX ADMIN — Medication 10 MILLIGRAM(S): at 21:42

## 2020-05-28 RX ADMIN — MAGNESIUM OXIDE 400 MG ORAL TABLET 400 MILLIGRAM(S): 241.3 TABLET ORAL at 06:01

## 2020-05-28 NOTE — PROGRESS NOTE ADULT - SUBJECTIVE AND OBJECTIVE BOX
Patient is a 64y old  Male who presents with a chief complaint of sepsis (24 May 2020 08:45)    INTERVAL HPI/OVERNIGHT EVENTS: no complaints, feels well, tested + for COVID (in order to return to group home) but a-Sx  ROS: Denies CP, SOB, AP, new weakness  All other systems reviewed and are within normal limits.  InitialHPI:  Patient is a 65 y/o male with PMH of cerebral palsy, seizure disorder, spastic paraplegia, s/p PEG tube, Asthma, H/O nephrolithiasis, BPH with bladder neck stricture s/p suprapubic catheter, and H/O Pseudomonas and ESBL Proteus mirabilis bacteruria who presented from a group with abdominal pain x1 days. Pt states that he started getting a R sided flank pain with radiation to his pelvis after getting his suprapubic cathter tube changes. Pt states that the pain is sharp and constant. Pt also endorses some nausea and vomited x1 in the AM. He also has some pain at the catheter insertion site however catheter has been draining well. Off note pt has a progressive worsening congestion for the last few days. As per aid, pts group home had a resident test postive for COVID. Pt was likely in close contact with resident. Pt also endorses recent subjective fevers. He has had a cough for the last month, pt was recently admitted to the ICU for non-COVID coronavirus bronchitis, never intubated. Denies any other complaints. Denies any chills, changes in weight, chest pain, SOB, diarrhea, myalgias, arthralgias syncope, fatigue.   In the ED, /67  RR 18 SpO2 94 on RA afebrile. CT A/P showed a [id number] proximal right ureteral stone with mod hydronephrosis. Urolgoy was consulted. Pt was given IVF, pain control and Merox1. Swabbed for COVID. (19 May 2020 08:44)    KIDNEY STONE;HYDRONEPHROSIS;URINARY TRACT INFECTION  ^KIDNEY STONE;HYDRONEPHROSIS;URINARY TRACT INFECTION  Family history unknown  No pertinent family history in first degree relatives  Handoff  MEWS Score  Asthma  Urinary retention  Urinary calculi  Spastic quadriplegia  Osteoporosis  Seizure  Cerebral palsy  BPH (benign prostatic hyperplasia)  Kidney stone  Suprapubic catheter  History of suprapubic catheter  H/O cystoscopy  S/P percutaneous endoscopic gastrostomy (PEG) tube placement  CATHETER PROBLEM  52  Urinary tract infection  Hydronephrosis    PAST MEDICAL & SURGICAL HISTORY:  Asthma  Urinary retention  Urinary calculi  Spastic quadriplegia  Osteoporosis  Seizure: last seizure &gt;10 years ago  Cerebral palsy  BPH (benign prostatic hyperplasia)  Suprapubic catheter  H/O cystoscopy  S/P percutaneous endoscopic gastrostomy (PEG) tube placement      General: NAD, AAO3, mild dysarthria  CV: S1 S2  Resp: decreased breath sounds at bases  GI: NT/ND/S +BS, +PEG, +suprapubic cath +Rt post nephro cath   MS: contracted b/l UE and LE, spastic quadriplegia            MEDICATIONS  (STANDING):  baclofen 10 milliGRAM(s) Oral three times a day  calcium carbonate   1250 mG (OsCal) 1 Tablet(s) Oral two times a day  calcium carbonate   1250 mG (OsCal) 1 Tablet(s) Oral daily  dicyclomine 10 milliGRAM(s) Oral daily  enoxaparin Injectable 40 milliGRAM(s) SubCutaneous daily  magnesium oxide 400 milliGRAM(s) Oral three times a day with meals  meropenem  IVPB      meropenem  IVPB 1000 milliGRAM(s) IV Intermittent every 8 hours  PHENobarbital 64.8 milliGRAM(s) Oral daily  polyethylene glycol 3350 17 Gram(s) Oral every 12 hours  senna 2 Tablet(s) Oral at bedtime  tamsulosin 0.4 milliGRAM(s) Oral at bedtime    MEDICATIONS  (PRN):  acetaminophen   Tablet .. 650 milliGRAM(s) Oral every 6 hours PRN Temp greater or equal to 38C (100.4F)  ALBUTerol    90 MICROgram(s) HFA Inhaler 2 Puff(s) Inhalation every 6 hours PRN Shortness of Breath and/or Wheezing  benzonatate 100 milliGRAM(s) Oral every 8 hours PRN Cough  ondansetron Injectable 4 milliGRAM(s) IV Push every 6 hours PRN Nausea    Home Medications:  Artificial Tears ophthalmic solution: 1 drop(s) to each affected eye 4 times a day (20 Mar 2020 21:56)  baclofen 10 mg oral tablet: 1 tab(s) by gastrostomy tube 3 times a day (20 Mar 2020 21:56)  benzonatate 100 mg oral capsule: 1 cap(s) orally every 8 hours, As needed, Cough (28 May 2020 09:22)  dicyclomine 10 mg oral capsule: 1 cap(s) orally once a day (28 May 2020 09:22)  docusate sodium 60 mg/15 mL oral syrup: 100 milligram(s) by gastrostomy tube once a day, As Needed for constipation (20 Mar 2020 21:56)  guaifenesin-dextromethorphan 100 mg-10 mg/5 mL oral liquid: 5 milliliter(s) orally every 6 hours, As needed, Cough (27 Mar 2020 11:14)  magnesium oxide 400 mg (241.3 mg elemental magnesium) oral tablet: 1 tab(s) orally 3 times a day (with meals) (28 May 2020 09:22)  Oysco 500 (1250 mg calcium carbonate) oral tablet: 1 tab(s) by gastrostomy tube 2 times a day (20 Mar 2020 21:56)  PHENobarbital 64.8 mg oral tablet: 1 tab(s) orally once a day via G tube (20 Mar 2020 21:56)  senna oral tablet: 2 tab(s) orally once a day (at bedtime) (28 May 2020 09:22)  tamsulosin 0.4 mg oral capsule: 1 cap(s) orally once a day (at bedtime) (28 May 2020 09:22)  Ventolin HFA 90 mcg/inh inhalation aerosol: 2 puff(s) inhaled 4 times a day, As Needed (20 Mar 2020 21:56)    Vital Signs Last 24 Hrs  T(C): 36.7 (28 May 2020 10:46), Max: 37.1 (27 May 2020 21:41)  T(F): 98 (28 May 2020 10:46), Max: 98.8 (27 May 2020 21:41)  HR: 68 (28 May 2020 10:46) (68 - 77)  BP: 101/60 (28 May 2020 10:46) (99/54 - 106/58)  BP(mean): --  RR: 18 (28 May 2020 10:46) (17 - 18)  SpO2: 90% (28 May 2020 10:46) (90% - 90%)  CAPILLARY BLOOD GLUCOSE        LABS:                        11.2   4.00  )-----------( 274      ( 28 May 2020 06:12 )             33.1     05-28    139  |  101  |  12  ----------------------------<  89  4.7   |  25  |  0.5<L>    Ca    8.4<L>      28 May 2020 06:12  Mg     2.3     05-28    TPro  6.2  /  Alb  3.1<L>  /  TBili  0.2  /  DBili  x   /  AST  40  /  ALT  38  /  AlkPhos  79  05-28    LIVER FUNCTIONS - ( 28 May 2020 06:12 )  Alb: 3.1 g/dL / Pro: 6.2 g/dL / ALK PHOS: 79 U/L / ALT: 38 U/L / AST: 40 U/L / GGT: x                           Consultant Notes Reviewed:  [x ] YES  [ ] NO  Care Discussed with Consultants/Other Providers/ Housestaff [ x] YES  [ ] NO  Radiology, labs, new studies personally reviewed. Patient is a 64y old  Male who presents with a chief complaint of sepsis (24 May 2020 08:45)    INTERVAL HPI/OVERNIGHT EVENTS: no complaints, feels well, tested + for COVID (in order to return to group home) but a-Sx  ROS: Denies CP, SOB, AP, new weakness  All other systems reviewed and are within normal limits.  InitialHPI:  Patient is a 63 y/o male with PMH of cerebral palsy, seizure disorder, spastic paraplegia, s/p PEG tube, Asthma, H/O nephrolithiasis, BPH with bladder neck stricture s/p suprapubic catheter, and H/O Pseudomonas and ESBL Proteus mirabilis bacteruria who presented from a group with abdominal pain x1 days. Pt states that he started getting a R sided flank pain with radiation to his pelvis after getting his suprapubic cathter tube changes. Pt states that the pain is sharp and constant. Pt also endorses some nausea and vomited x1 in the AM. He also has some pain at the catheter insertion site however catheter has been draining well. Off note pt has a progressive worsening congestion for the last few days. As per aid, pts group home had a resident test postive for COVID. Pt was likely in close contact with resident. Pt also endorses recent subjective fevers. He has had a cough for the last month, pt was recently admitted to the ICU for non-COVID coronavirus bronchitis, never intubated. Denies any other complaints. Denies any chills, changes in weight, chest pain, SOB, diarrhea, myalgias, arthralgias syncope, fatigue.   In the ED, /67  RR 18 SpO2 94 on RA afebrile. CT A/P showed a [id number] proximal right ureteral stone with mod hydronephrosis. Urolgoy was consulted. Pt was given IVF, pain control and Merox1. Swabbed for COVID. (19 May 2020 08:44)    KIDNEY STONE;HYDRONEPHROSIS;URINARY TRACT INFECTION  ^KIDNEY STONE;HYDRONEPHROSIS;URINARY TRACT INFECTION  Family history unknown  No pertinent family history in first degree relatives  Handoff  MEWS Score  Asthma  Urinary retention  Urinary calculi  Spastic quadriplegia  Osteoporosis  Seizure  Cerebral palsy  BPH (benign prostatic hyperplasia)  Kidney stone  Suprapubic catheter  History of suprapubic catheter  H/O cystoscopy  S/P percutaneous endoscopic gastrostomy (PEG) tube placement  CATHETER PROBLEM  52  Urinary tract infection  Hydronephrosis    PAST MEDICAL & SURGICAL HISTORY:  Asthma  Urinary retention  Urinary calculi  Spastic quadriplegia  Osteoporosis  Seizure: last seizure &gt;10 years ago  Cerebral palsy  BPH (benign prostatic hyperplasia)  Suprapubic catheter  H/O cystoscopy  S/P percutaneous endoscopic gastrostomy (PEG) tube placement      General: NAD, AAO3, mild dysarthria  CV: S1 S2  Resp: decreased breath sounds at bases  GI: NT/ND/S +BS, +PEG, +suprapubic cath +Rt post urostomy  MS: contracted b/l UE and LE, spastic quadriplegia            MEDICATIONS  (STANDING):  baclofen 10 milliGRAM(s) Oral three times a day  calcium carbonate   1250 mG (OsCal) 1 Tablet(s) Oral two times a day  calcium carbonate   1250 mG (OsCal) 1 Tablet(s) Oral daily  dicyclomine 10 milliGRAM(s) Oral daily  enoxaparin Injectable 40 milliGRAM(s) SubCutaneous daily  magnesium oxide 400 milliGRAM(s) Oral three times a day with meals  meropenem  IVPB      meropenem  IVPB 1000 milliGRAM(s) IV Intermittent every 8 hours  PHENobarbital 64.8 milliGRAM(s) Oral daily  polyethylene glycol 3350 17 Gram(s) Oral every 12 hours  senna 2 Tablet(s) Oral at bedtime  tamsulosin 0.4 milliGRAM(s) Oral at bedtime    MEDICATIONS  (PRN):  acetaminophen   Tablet .. 650 milliGRAM(s) Oral every 6 hours PRN Temp greater or equal to 38C (100.4F)  ALBUTerol    90 MICROgram(s) HFA Inhaler 2 Puff(s) Inhalation every 6 hours PRN Shortness of Breath and/or Wheezing  benzonatate 100 milliGRAM(s) Oral every 8 hours PRN Cough  ondansetron Injectable 4 milliGRAM(s) IV Push every 6 hours PRN Nausea    Home Medications:  Artificial Tears ophthalmic solution: 1 drop(s) to each affected eye 4 times a day (20 Mar 2020 21:56)  baclofen 10 mg oral tablet: 1 tab(s) by gastrostomy tube 3 times a day (20 Mar 2020 21:56)  benzonatate 100 mg oral capsule: 1 cap(s) orally every 8 hours, As needed, Cough (28 May 2020 09:22)  dicyclomine 10 mg oral capsule: 1 cap(s) orally once a day (28 May 2020 09:22)  docusate sodium 60 mg/15 mL oral syrup: 100 milligram(s) by gastrostomy tube once a day, As Needed for constipation (20 Mar 2020 21:56)  guaifenesin-dextromethorphan 100 mg-10 mg/5 mL oral liquid: 5 milliliter(s) orally every 6 hours, As needed, Cough (27 Mar 2020 11:14)  magnesium oxide 400 mg (241.3 mg elemental magnesium) oral tablet: 1 tab(s) orally 3 times a day (with meals) (28 May 2020 09:22)  Oysco 500 (1250 mg calcium carbonate) oral tablet: 1 tab(s) by gastrostomy tube 2 times a day (20 Mar 2020 21:56)  PHENobarbital 64.8 mg oral tablet: 1 tab(s) orally once a day via G tube (20 Mar 2020 21:56)  senna oral tablet: 2 tab(s) orally once a day (at bedtime) (28 May 2020 09:22)  tamsulosin 0.4 mg oral capsule: 1 cap(s) orally once a day (at bedtime) (28 May 2020 09:22)  Ventolin HFA 90 mcg/inh inhalation aerosol: 2 puff(s) inhaled 4 times a day, As Needed (20 Mar 2020 21:56)    Vital Signs Last 24 Hrs  T(C): 36.7 (28 May 2020 10:46), Max: 37.1 (27 May 2020 21:41)  T(F): 98 (28 May 2020 10:46), Max: 98.8 (27 May 2020 21:41)  HR: 68 (28 May 2020 10:46) (68 - 77)  BP: 101/60 (28 May 2020 10:46) (99/54 - 106/58)  BP(mean): --  RR: 18 (28 May 2020 10:46) (17 - 18)  SpO2: 90% (28 May 2020 10:46) (90% - 90%)  CAPILLARY BLOOD GLUCOSE        LABS:                        11.2   4.00  )-----------( 274      ( 28 May 2020 06:12 )             33.1     05-28    139  |  101  |  12  ----------------------------<  89  4.7   |  25  |  0.5<L>    Ca    8.4<L>      28 May 2020 06:12  Mg     2.3     05-28    TPro  6.2  /  Alb  3.1<L>  /  TBili  0.2  /  DBili  x   /  AST  40  /  ALT  38  /  AlkPhos  79  05-28    LIVER FUNCTIONS - ( 28 May 2020 06:12 )  Alb: 3.1 g/dL / Pro: 6.2 g/dL / ALK PHOS: 79 U/L / ALT: 38 U/L / AST: 40 U/L / GGT: x                           Consultant Notes Reviewed:  [x ] YES  [ ] NO  Care Discussed with Consultants/Other Providers/ Housestaff [ x] YES  [ ] NO  Radiology, labs, new studies personally reviewed.

## 2020-05-28 NOTE — PROGRESS NOTE ADULT - ASSESSMENT
63 y/o Male s/p right PCNL. Pt doing better.  He was to be d/c today but is now Covid 19 + on repeat swab.    A) Stable POD # 2  s/p right PCNL all tube removed  Right 3, 5 mm residual stones  Covid 19 +    P) No further acute urologic intervention at this time.  OP f/u in 2 weeks for SP tube change, urostomy bag  can be removed since there is no output.  recall prn  d/w Dr. brown

## 2020-05-28 NOTE — PROGRESS NOTE ADULT - ASSESSMENT
· Assessment		  65 y/o male with PMH of cerebral palsy, seizure disorder, spastic paraplegia, s/p PEG tube, Asthma, H/O nephrolithiasis, BPH with bladder neck stricture s/p suprapubic catheter, and H/O Pseudomonas and ESBL Proteus mirabilis bacteruria who presented from a group with abdominal pain x1 days found to have R sided nephrolithiasis with hydronephrosis. Pt also has recent URI symptoms r/o COVID.     IMPRESSION;   Resolved sepsis secondary to acute Right pyelonephritis with right hydronephrosis with right ureteral stone  S/p R PCN by IVR 5/20  IVR Procedure: 5/26   Over-the-wire right nephrostogram and removal of right nephroureteral catheter  BCx 5/19 CoNS ( not a true pathogen)  BCx 5/20  P mirabilis ESBl  BCx 5/22 NG  UCx 5/19 P mirabilis ESBL  CT A/P - right hydronephrosis with a 9 x 7 x 10 mm proximal right ureteral calculus and ureteral enhancement   PROCEDURES:  Nephrostogram 26-May-2020 15:34:10 right Bonnie Cooper  Replacement of external ureteral stent 26-May-2020 15:34:02 right Bonnie Cooper  Antegrade ureteroscopy 26-May-2020 15:33:44 right Bonnie Cooper  Percutaneous removal of calculus of kidney, 2 cm or more in diameter 26-May-  COVID19 POSITIVE 5/27 after being repeatedly NG  Do not see any evidence of active disease    RECOMMENDATIONS;  home on ertapenem 1 gm iv q24h for 14 days starting 5/26  needs COVID-19 precautions

## 2020-05-28 NOTE — PROGRESS NOTE ADULT - ASSESSMENT
Patient is a 63 y/o male with PMH of cerebral palsy, seizure disorder, spastic paraplegia, s/p PEG tube, Asthma, H/O nephrolithiasis, BPH with bladder neck stricture s/p suprapubic catheter, and H/O Pseudomonas and ESBL Proteus mirabilis bacteruria who presented from a group with abdominal pain x1 days.       #Sepsis due to UTI (resolving) 2/2 pyelonephritis with hydronephrosis and ureter stones with proteus ESBL bacteruria and bacteremia   - s/p R PCT nephrostomy with stent on 5/20  - s/p Percutaneous nephrolithotomy (PCNL) for stone, stent removal  - c/w Meropenem (proteus ESBL bacteruria and bacteremia), Ertapenem on d/c until 6/8/20    #+COVID swab (done only so he can return to group home)  -pt a-Sx  -check baseline CRP, procalcitonin, d-dimer, ferritin  -repeat COVID depending on group home policy (?5/29)    #lactic acidosis: (reolved) possibly seizure after nephrostomy  with subtherapeutic phenobarb level  - phenobarb level is low, REEG  no seizure, just slowing, as per Neuro (spoke with Dr. Shipley), no dose adjustment needed,   - lactate level normalized    #normocytic anemia: Hbg stable, likely dilutional, no signs of blood loss, monitor Hbg and keep active type and screening     #Abd distention: resolved   - PEG tube feeding  - bowel regimen     # Seizure disorder - Continue with phenobarbital 64.8mg PO through PEG tube QD (current level is ok as per neuro)    # Cerebral palsy w/ Spastic quadriplegia - Continue with baclofen 10mg PO three times a day, c/w PEG feeds     # Functional quadriplegia - full care    # Asthma - No wheezing on exam, on RA, Albuterol PRN     # BPH with bladder neck stricture s/p suprapubic catheter - Tube replaced day before admission, Chronic colonization w/ ESBL Proteus Mirabilis, grew Pseudomonas on last admission,    #DVT Prophylaxis: Lovenox 40mg SQ once daily,     #GI Prophylaxis: Not indicated     #Diet: through PEG tube feeding. NPO     Activity: bed rest     Dispo: care home, , Leslie ([phone]) is the sister and health care proxy, Mitch is the nurse at long-term [phone],   Code Status: Full Code    #Progress Note Handoff  Pending (specify): Once Group Home can take pt back (need to check w/ group home their policy)  Pt/Family discussion: Pt/sister informed and agree with the current plan  Disposition: Home__x____SNF_______To be determined________ . Patient is a 63 y/o male with PMH of cerebral palsy, seizure disorder, spastic paraplegia, s/p PEG tube, Asthma, H/O nephrolithiasis, BPH with bladder neck stricture s/p suprapubic catheter, and H/O Pseudomonas and ESBL Proteus mirabilis bacteruria who presented from a group with abdominal pain x1 days.       #Sepsis due to UTI (resolving) 2/2 pyelonephritis with hydronephrosis and ureter stones with proteus ESBL bacteruria and bacteremia   - s/p R PCT nephrostomy with stent on 5/20  - s/p Percutaneous nephrolithotomy (PCNL) for stone, stent removal  - c/w Meropenem (proteus ESBL bacteruria and bacteremia), Ertapenem on d/c until 6/8/20  -remove urostomy tube if no output in the next 24 hrs    #+COVID swab (done only so he can return to group home)  -pt a-Sx  -check baseline CRP, procalcitonin, d-dimer, ferritin  -repeat COVID depending on group home policy (?5/29)    #lactic acidosis: (reolved) possibly seizure after nephrostomy  with subtherapeutic phenobarb level  - phenobarb level is low, REEG  no seizure, just slowing, as per Neuro (spoke with Dr. Shipley), no dose adjustment needed,   - lactate level normalized    #normocytic anemia: Hbg stable, likely dilutional, no signs of blood loss, monitor Hbg and keep active type and screening     #Abd distention: resolved   - PEG tube feeding  - bowel regimen     # Seizure disorder - Continue with phenobarbital 64.8mg PO through PEG tube QD (current level is ok as per neuro)    # Cerebral palsy w/ Spastic quadriplegia - Continue with baclofen 10mg PO three times a day, c/w PEG feeds     # Functional quadriplegia - full care    # Asthma - No wheezing on exam, on RA, Albuterol PRN     # BPH with bladder neck stricture s/p suprapubic catheter - Tube replaced day before admission, Chronic colonization w/ ESBL Proteus Mirabilis, grew Pseudomonas on last admission,    #DVT Prophylaxis: Lovenox 40mg SQ once daily,     #GI Prophylaxis: Not indicated     #Diet: through PEG tube feeding. NPO     Activity: bed rest     Dispo: alf, , Leslie ([phone]) is the sister and health care proxy, Mitch is the nurse at [phone],   Code Status: Full Code    #Progress Note Handoff  Pending (specify): Once Group Home can take pt back (need to check w/ group home their policy)  Pt/Family discussion: Pt/sister informed and agree with the current plan  Disposition: Home__x____SNF_______To be determined________ .

## 2020-05-28 NOTE — PROGRESS NOTE ADULT - ASSESSMENT
Patient is a 63 y/o male with PMH of cerebral palsy, seizure disorder, spastic paraplegia, s/p PEG tube, Asthma, H/O nephrolithiasis, BPH with bladder neck stricture s/p suprapubic catheter, and H/O Pseudomonas and ESBL Proteus mirabilis bacteruria who presented from a group with abdominal pain x1 days.       #urosepsis (resolving) 2/2 pyelonephritis with hydronephrosis and ureter stones with proteus ESBL bacteruria and bacteremia   - s/p R PCT nephrostomy with stent on 5/20, and Percutaneous nephrolithotomy (PCNL) 5/26,   - no further intervention by urology  - Pain control with Toradol PRN, monitor renal function   - c/wTamsulosin and Bentyl to facilitate passage   - c/w Meropenem (proteus ESBL bacteruria and bacteremia), repeat Bcx negative on 5/22,on discharge Ertapenem 1 gram QD to complete 14 days ends on 6/8/2020  - monitor UO through right nephrostomy drainage, once zero for the next 24 hours, can be removed  - suprapubic catherter change Q4 weeks, due to change in 2 weeks     #COVID 19 +ve PCR on 5/27  -pt was swapped for disposition does not have fever, cough or SOB, stable on RA  - transfer to [facility] floor  - send procal, D-dimer, CRP, ferritin     #lactic acidosis: (reolved) possibly seizure after nephrostomy  with subtherapeutic phenobarb level  - phenobarb level is low, REEG  no seizure, just slowing, as per Neuro (spoke with Dr. Shipley), no dose adjustment needed,   - lactate level normalized    #acute normocytic anemia: Hbg stable, likely dilutional, no signs of blood loss, monitor Hbg and keep active type and screening     #Abd distention: resolved, normal KUB  - PEG tube feeding  - bowel regimen       # chronic Cough is unlikely from  bronchitis     # Seizure disorder - Continue with phenobarbital 64.8mg PO through PEG tube QD    # Cerebral palsy w/ Spastic quadriplegia - Continue with baclofen 10mg PO three times a day, c/w PEG feeds     # Functional quadriplegia - full care    # Asthma - No wheezing on exam, on RA, Albuterol PRN     # BPH with bladder neck stricture s/p suprapubic catheter - Tube replaced day before admission, Chronic colonization w/ ESBL Proteus Mirabilis and Pseudomonas on last admission,    #DVT Prophylaxis: Lovenox 40mg SQ once daily,     #GI Prophylaxis: Not indicated     #Diet: through PEG tube feeding    Activity: bed rest     Dispo: [facility], , Leslie ([phone]) is the sister and health care proxy, Mitch is the nurse at [facility] [phone],   Code Status: Full Code  Dispo: from group home, now TBD as covid +ve

## 2020-05-28 NOTE — PROGRESS NOTE ADULT - SUBJECTIVE AND OBJECTIVE BOX
Patient is a 64y old  Male who presents with a chief complaint of nephrolithiasis (28 May 2020 08:23)      OVERNIGHT EVENTS: remained stable, no fever, cough, SOB or abd pain     SUBJECTIVE / INTERVAL HPI: Patient seen and examined at bedside.     VITAL SIGNS:  Vital Signs Last 24 Hrs  T(C): 36.6 (28 May 2020 05:40), Max: 37.1 (27 May 2020 21:41)  T(F): 97.8 (28 May 2020 05:40), Max: 98.8 (27 May 2020 21:41)  HR: 72 (28 May 2020 05:40) (72 - 77)  BP: 99/54 (28 May 2020 05:40) (99/54 - 106/58)  BP(mean): --  RR: 18 (28 May 2020 05:40) (17 - 18)  SpO2: --    PHYSICAL EXAM:    General: WDWN  HEENT: NC/AT; PERRL, clear conjunctiva  Neck: supple  Cardiovascular: +S1/S2; RRR  Respiratory: CTA b/l; no W/R/R  Gastrointestinal: soft, NT/ND; +BSx4, suprapubic catheter, nephrostomy tube,   Extremities: WWP; 2+ peripheral pulses; no edema   Neurological: AAOx3; contracted extremities, no focal deficits    MEDICATIONS:  MEDICATIONS  (STANDING):  baclofen 10 milliGRAM(s) Oral three times a day  calcium carbonate   1250 mG (OsCal) 1 Tablet(s) Oral two times a day  calcium carbonate   1250 mG (OsCal) 1 Tablet(s) Oral daily  dicyclomine 10 milliGRAM(s) Oral daily  magnesium oxide 400 milliGRAM(s) Oral three times a day with meals  meropenem  IVPB      meropenem  IVPB 1000 milliGRAM(s) IV Intermittent every 8 hours  PHENobarbital 64.8 milliGRAM(s) Oral daily  polyethylene glycol 3350 17 Gram(s) Oral every 12 hours  senna 2 Tablet(s) Oral at bedtime  tamsulosin 0.4 milliGRAM(s) Oral at bedtime    MEDICATIONS  (PRN):  acetaminophen   Tablet .. 650 milliGRAM(s) Oral every 6 hours PRN Temp greater or equal to 38C (100.4F)  ALBUTerol    90 MICROgram(s) HFA Inhaler 2 Puff(s) Inhalation every 6 hours PRN Shortness of Breath and/or Wheezing  benzonatate 100 milliGRAM(s) Oral every 8 hours PRN Cough  ondansetron Injectable 4 milliGRAM(s) IV Push every 6 hours PRN Nausea      ALLERGIES:  Allergies    No Known Allergies    Intolerances        LABS:                        11.2   4.00  )-----------( 274      ( 28 May 2020 06:12 )             33.1     05-28    139  |  101  |  12  ----------------------------<  89  4.7   |  25  |  0.5<L>    Ca    8.4<L>      28 May 2020 06:12  Mg     2.3     05-28    TPro  6.2  /  Alb  3.1<L>  /  TBili  0.2  /  DBili  x   /  AST  40  /  ALT  38  /  AlkPhos  79  05-28        CAPILLARY BLOOD GLUCOSE          RADIOLOGY & ADDITIONAL TESTS: Reviewed.    ASSESSMENT:    PLAN:

## 2020-05-28 NOTE — PROGRESS NOTE ADULT - SUBJECTIVE AND OBJECTIVE BOX
HAWATISH SCHUSTER  64y, Male    All available historical data reviewed    OVERNIGHT EVENTS:    no fevers  feels well and has no complaints   responsive  COVID-19 positive 5/27 after being repeatedly NG  ROS:  unable to obtain history secondary to patient's mental status and/or sedation     VITALS:  T(F): 97.8, Max: 98.8 (05-27-20 @ 21:41)  HR: 72  BP: 99/54  RR: 18Vital Signs Last 24 Hrs  T(C): 36.6 (28 May 2020 05:40), Max: 37.1 (27 May 2020 21:41)  T(F): 97.8 (28 May 2020 05:40), Max: 98.8 (27 May 2020 21:41)  HR: 72 (28 May 2020 05:40) (72 - 77)  BP: 99/54 (28 May 2020 05:40) (99/54 - 106/58)  BP(mean): --  RR: 18 (28 May 2020 05:40) (17 - 18)  SpO2: --    TESTS & MEASUREMENTS:                        11.2   4.00  )-----------( 274      ( 28 May 2020 06:12 )             33.1     05-28    139  |  101  |  12  ----------------------------<  89  4.7   |  25  |  0.5<L>    Ca    8.4<L>      28 May 2020 06:12  Mg     2.3     05-28    TPro  6.2  /  Alb  3.1<L>  /  TBili  0.2  /  DBili  x   /  AST  40  /  ALT  38  /  AlkPhos  79  05-28    LIVER FUNCTIONS - ( 28 May 2020 06:12 )  Alb: 3.1 g/dL / Pro: 6.2 g/dL / ALK PHOS: 79 U/L / ALT: 38 U/L / AST: 40 U/L / GGT: x             Culture - Blood (collected 05-25-20 @ 07:10)  Source: .Blood None  Preliminary Report (05-26-20 @ 12:01):    No growth to date.    Culture - Blood (collected 05-22-20 @ 06:19)  Source: .Blood None  Final Report (05-27-20 @ 12:00):    No Growth Final            RADIOLOGY & ADDITIONAL TESTS:  Personal review of radiological diagnostics performed  Echo and EKG results noted when applicable.     MEDICATIONS:  acetaminophen   Tablet .. 650 milliGRAM(s) Oral every 6 hours PRN  ALBUTerol    90 MICROgram(s) HFA Inhaler 2 Puff(s) Inhalation every 6 hours PRN  baclofen 10 milliGRAM(s) Oral three times a day  benzonatate 100 milliGRAM(s) Oral every 8 hours PRN  calcium carbonate   1250 mG (OsCal) 1 Tablet(s) Oral two times a day  calcium carbonate   1250 mG (OsCal) 1 Tablet(s) Oral daily  dicyclomine 10 milliGRAM(s) Oral daily  magnesium oxide 400 milliGRAM(s) Oral three times a day with meals  meropenem  IVPB      meropenem  IVPB 1000 milliGRAM(s) IV Intermittent every 8 hours  ondansetron Injectable 4 milliGRAM(s) IV Push every 6 hours PRN  PHENobarbital 64.8 milliGRAM(s) Oral daily  polyethylene glycol 3350 17 Gram(s) Oral every 12 hours  senna 2 Tablet(s) Oral at bedtime  tamsulosin 0.4 milliGRAM(s) Oral at bedtime      ANTIBIOTICS:  meropenem  IVPB      meropenem  IVPB 1000 milliGRAM(s) IV Intermittent every 8 hours

## 2020-05-28 NOTE — CHART NOTE - NSCHARTNOTEFT_GEN_A_CORE
Patient is a 63 y/o male with PMH of cerebral palsy, seizure disorder, spastic paraplegia, s/p PEG tube, Asthma, H/O nephrolithiasis, BPH with bladder neck stricture   s/p suprapubic catheter, and H/O Pseudomonas and ESBL Proteus mirabilis bacteruria who presented from a group with abdominal pain x1 days.   pt was diagnosed with urosepsis with obstructive LANA and bilateral hydronephrosis due to multiple ureters stones, pt was started on Meropenem and IVF,   pt spiked fever despite being on antibiotics, so he underwent R PCT nephrostomy with stent on 5/20, and Percutaneous nephrolithotomy (PCNL) 5/26,  Bcx and Ucx grew proteus ESBL, on repeat Bcx 5/22 result is negative, pt has been stable, no fever, abd pain, cough or SOB, he had midline inserted on 5/28 to complete   14 days of Ertapenem (end date 6/4/2020), COVID swap was sent as screening for disposition prior to discharge to group home and came +ve.  pt has no cough, SOB or fever.         #urosepsis (resolving) 2/2 pyelonephritis with hydronephrosis and ureter stones with proteus ESBL bacteruria and bacteremia   - s/p R PCT nephrostomy with stent on 5/20, and Percutaneous nephrolithotomy (PCNL) 5/26,   - no further intervention by urology  - Pain control with Toradol PRN, monitor renal function   - c/w Tamsulosin and Bentyl to facilitate passage   - c/w Meropenem (proteus ESBL bacteruria and bacteremia), repeat Bcx negative on 5/22,on discharge Ertapenem 1 gram QD to complete 14 days ends on 6/8/2020  - monitor UO through right nephrostomy drainage, once zero for the next 24 hours, can be removed  - suprapubic catheter change Q4 weeks, due to change in 2 weeks     #COVID 19 +ve PCR on 5/27  -pt was swapped for disposition does not have fever, cough or SOB, stable on RA  - transfer to [facility] floor  - send procal, D-dimer, CRP, ferritin, CXR     #lactic acidosis: (reolved) possibly seizure after nephrostomy  with subtherapeutic phenobarb level  - phenobarb level is low, REEG  no seizure, just slowing, as per Neuro (spoke with Dr. Shipley), no dose adjustment needed,   - lactate level normalized    #acute normocytic anemia: Hbg stable, likely dilutional, no signs of blood loss, monitor Hbg and keep active type and screening     #Abd distention: resolved, normal KUB  - PEG tube feeding  - bowel regimen       # chronic Cough is unlikely from  bronchitis     # Seizure disorder - Continue with phenobarbital 64.8mg PO through PEG tube QD    # Cerebral palsy w/ Spastic quadriplegia - Continue with baclofen 10mg PO three times a day, c/w PEG feeds     # Functional quadriplegia - full care    # Asthma - No wheezing on exam, on RA, Albuterol PRN     # BPH with bladder neck stricture s/p suprapubic catheter - Tube replaced day before admission, Chronic colonization w/ ESBL Proteus Mirabilis and Pseudomonas on last admission,    #DVT Prophylaxis: Lovenox 40mg SQ once daily,     #GI Prophylaxis: Not indicated     #Diet: through PEG tube feeding    Activity: bed rest     Dispo: [facility], , Leslie ([phone]) is the sister and health care proxy, Mitch is the nurse at [facility] [phone],   Code Status: Full Code  Dispo: from group home, now TBD as covid +ve Patient is a 65 y/o male with PMH of cerebral palsy, seizure disorder, spastic paraplegia, s/p PEG tube, Asthma, H/O nephrolithiasis, BPH with bladder neck stricture   s/p suprapubic catheter, and H/O Pseudomonas and ESBL Proteus mirabilis bacteruria who presented from a group with abdominal pain x1 days.     #Hospital course:  pt was diagnosed with urosepsis with obstructive LANA and bilateral hydronephrosis due to multiple ureters stones, pt was started on Meropenem and IVF,   pt spiked fever despite being on antibiotics, so he underwent R PCT nephrostomy with stent on 5/20, and Percutaneous nephrolithotomy (PCNL) 5/26,    Bcx and Ucx grew proteus ESBL, on repeat Bcx 5/22 result is negative, pt has been stable, no fever, abd pain, cough or SOB, he had midline inserted on 5/28 to complete   14 days of Ertapenem (end date 6/8/2020),     COVID swap was sent as screening for disposition prior to discharge to group home and came +ve.  pt has no cough, SOB or fever.         #urosepsis (resolving) 2/2 pyelonephritis with hydronephrosis and ureter stones with proteus ESBL bacteruria and bacteremia   - s/p R PCT nephrostomy with stent on 5/20, and Percutaneous nephrolithotomy (PCNL) 5/26,   - no further intervention by urology  - Pain control with Toradol PRN, monitor renal function   - c/w Tamsulosin and Bentyl to facilitate passage   - c/w Meropenem (proteus ESBL bacteruria and bacteremia), repeat Bcx negative on 5/22,on discharge Ertapenem 1 gram QD to complete 14 days ends on 6/8/2020  - monitor UO through right nephrostomy drainage, once zero for the next 24 hours, can be removed  - suprapubic catheter change Q4 weeks, due to change in 2 weeks     #COVID 19 +ve PCR on 5/27  -pt was swapped for disposition does not have fever, cough or SOB, stable on RA  - transfer to [facility] floor  - send procal, D-dimer, CRP, ferritin, CXR     #lactic acidosis: (reolved) possibly seizure after nephrostomy  with subtherapeutic phenobarb level  - phenobarb level is low, REEG  no seizure, just slowing, as per Neuro (spoke with Dr. Shipley), no dose adjustment needed,   - lactate level normalized    #acute normocytic anemia: Hbg stable, likely dilutional, no signs of blood loss, monitor Hbg and keep active type and screening     #Abd distention: resolved, normal KUB  - PEG tube feeding  - bowel regimen       # chronic Cough is unlikely from  bronchitis     # Seizure disorder - Continue with phenobarbital 64.8mg PO through PEG tube QD    # Cerebral palsy w/ Spastic quadriplegia - Continue with baclofen 10mg PO three times a day, c/w PEG feeds     # Functional quadriplegia - full care    # Asthma - No wheezing on exam, on RA, Albuterol PRN     # BPH with bladder neck stricture s/p suprapubic catheter - Tube replaced day before admission, Chronic colonization w/ ESBL Proteus Mirabilis and Pseudomonas on last admission,    #DVT Prophylaxis: Lovenox 40mg SQ once daily,     #GI Prophylaxis: Not indicated     #Diet: through PEG tube feeding    Activity: bed rest     Dispo: [facility], , Leslie ([phone]) is the sister and health care proxy, Mitch is the nurse at [facility] [phone],   Code Status: Full Code  Dispo: from group home, now TBD as covid +ve

## 2020-05-28 NOTE — PROGRESS NOTE ADULT - SUBJECTIVE AND OBJECTIVE BOX
Progress Note    Subjective  63 y/o Male s/p right PCNL. Pt doing better.  He was to be d/c today but is now Covid 19 + on repeat swab.      [x] a 10 point review of systems was negative except where noted    [  ]  Due to altered mental status/intubation, subjective information was not able t be obtained from the patient.  History was obtained, to the extent possible, from review of the chart and collateral sources of information.    Vital signs  T(C): , Max: 37.1 (05-27-20 @ 21:41)  HR: 72 (05-28-20 @ 05:40)  BP: 99/54 (05-28-20 @ 05:40)    urostomy bag op 0cc    Labs                        11.2   4.00  )-----------( 274      ( 28 May 2020 06:12 )             33.1     27 May 2020 04:45    139    |  106    |  12     ----------------------------<  95     4.3     |  25     |  <0.5     Ca    8.0        27 May 2020 04:45

## 2020-05-29 ENCOUNTER — TRANSCRIPTION ENCOUNTER (OUTPATIENT)
Age: 65
End: 2020-05-29

## 2020-05-29 LAB
ANION GAP SERPL CALC-SCNC: 13 MMOL/L — SIGNIFICANT CHANGE UP (ref 7–14)
BASOPHILS # BLD AUTO: 0.03 K/UL — SIGNIFICANT CHANGE UP (ref 0–0.2)
BASOPHILS NFR BLD AUTO: 0.7 % — SIGNIFICANT CHANGE UP (ref 0–1)
BUN SERPL-MCNC: 14 MG/DL — SIGNIFICANT CHANGE UP (ref 10–20)
CALCIUM SERPL-MCNC: 9.2 MG/DL — SIGNIFICANT CHANGE UP (ref 8.5–10.1)
CHLORIDE SERPL-SCNC: 98 MMOL/L — SIGNIFICANT CHANGE UP (ref 98–110)
CO2 SERPL-SCNC: 27 MMOL/L — SIGNIFICANT CHANGE UP (ref 17–32)
CREAT SERPL-MCNC: 0.5 MG/DL — LOW (ref 0.7–1.5)
EOSINOPHIL # BLD AUTO: 0.33 K/UL — SIGNIFICANT CHANGE UP (ref 0–0.7)
EOSINOPHIL NFR BLD AUTO: 8.2 % — HIGH (ref 0–8)
FERRITIN SERPL-MCNC: 345 NG/ML — SIGNIFICANT CHANGE UP (ref 30–400)
GLUCOSE SERPL-MCNC: 108 MG/DL — HIGH (ref 70–99)
HCT VFR BLD CALC: 37.7 % — LOW (ref 42–52)
HGB BLD-MCNC: 12.9 G/DL — LOW (ref 14–18)
IMM GRANULOCYTES NFR BLD AUTO: 1.2 % — HIGH (ref 0.1–0.3)
LYMPHOCYTES # BLD AUTO: 1.13 K/UL — LOW (ref 1.2–3.4)
LYMPHOCYTES # BLD AUTO: 28.2 % — SIGNIFICANT CHANGE UP (ref 20.5–51.1)
MAGNESIUM SERPL-MCNC: 2.4 MG/DL — SIGNIFICANT CHANGE UP (ref 1.8–2.4)
MCHC RBC-ENTMCNC: 32.7 PG — HIGH (ref 27–31)
MCHC RBC-ENTMCNC: 34.2 G/DL — SIGNIFICANT CHANGE UP (ref 32–37)
MCV RBC AUTO: 95.4 FL — HIGH (ref 80–94)
MONOCYTES # BLD AUTO: 0.42 K/UL — SIGNIFICANT CHANGE UP (ref 0.1–0.6)
MONOCYTES NFR BLD AUTO: 10.5 % — HIGH (ref 1.7–9.3)
NEUTROPHILS # BLD AUTO: 2.05 K/UL — SIGNIFICANT CHANGE UP (ref 1.4–6.5)
NEUTROPHILS NFR BLD AUTO: 51.2 % — SIGNIFICANT CHANGE UP (ref 42.2–75.2)
NRBC # BLD: 0 /100 WBCS — SIGNIFICANT CHANGE UP (ref 0–0)
PLATELET # BLD AUTO: 303 K/UL — SIGNIFICANT CHANGE UP (ref 130–400)
POTASSIUM SERPL-MCNC: 4.3 MMOL/L — SIGNIFICANT CHANGE UP (ref 3.5–5)
POTASSIUM SERPL-SCNC: 4.3 MMOL/L — SIGNIFICANT CHANGE UP (ref 3.5–5)
PROCALCITONIN SERPL-MCNC: 0.22 NG/ML — HIGH (ref 0.02–0.1)
RBC # BLD: 3.95 M/UL — LOW (ref 4.7–6.1)
RBC # FLD: 14.6 % — HIGH (ref 11.5–14.5)
SODIUM SERPL-SCNC: 138 MMOL/L — SIGNIFICANT CHANGE UP (ref 135–146)
WBC # BLD: 4.01 K/UL — LOW (ref 4.8–10.8)
WBC # FLD AUTO: 4.01 K/UL — LOW (ref 4.8–10.8)

## 2020-05-29 PROCEDURE — 71045 X-RAY EXAM CHEST 1 VIEW: CPT | Mod: 26

## 2020-05-29 PROCEDURE — 99232 SBSQ HOSP IP/OBS MODERATE 35: CPT

## 2020-05-29 RX ADMIN — POLYETHYLENE GLYCOL 3350 17 GRAM(S): 17 POWDER, FOR SOLUTION ORAL at 05:23

## 2020-05-29 RX ADMIN — Medication 10 MILLIGRAM(S): at 21:50

## 2020-05-29 RX ADMIN — Medication 1 TABLET(S): at 11:29

## 2020-05-29 RX ADMIN — SENNA PLUS 2 TABLET(S): 8.6 TABLET ORAL at 21:50

## 2020-05-29 RX ADMIN — Medication 1 TABLET(S): at 17:35

## 2020-05-29 RX ADMIN — Medication 10 MILLIGRAM(S): at 13:01

## 2020-05-29 RX ADMIN — MAGNESIUM OXIDE 400 MG ORAL TABLET 400 MILLIGRAM(S): 241.3 TABLET ORAL at 11:29

## 2020-05-29 RX ADMIN — MAGNESIUM OXIDE 400 MG ORAL TABLET 400 MILLIGRAM(S): 241.3 TABLET ORAL at 05:22

## 2020-05-29 RX ADMIN — Medication 1 TABLET(S): at 05:22

## 2020-05-29 RX ADMIN — Medication 10 MILLIGRAM(S): at 11:29

## 2020-05-29 RX ADMIN — MEROPENEM 100 MILLIGRAM(S): 1 INJECTION INTRAVENOUS at 21:50

## 2020-05-29 RX ADMIN — Medication 64.8 MILLIGRAM(S): at 11:28

## 2020-05-29 RX ADMIN — MAGNESIUM OXIDE 400 MG ORAL TABLET 400 MILLIGRAM(S): 241.3 TABLET ORAL at 17:34

## 2020-05-29 RX ADMIN — ENOXAPARIN SODIUM 40 MILLIGRAM(S): 100 INJECTION SUBCUTANEOUS at 11:29

## 2020-05-29 RX ADMIN — MEROPENEM 100 MILLIGRAM(S): 1 INJECTION INTRAVENOUS at 13:01

## 2020-05-29 RX ADMIN — MEROPENEM 100 MILLIGRAM(S): 1 INJECTION INTRAVENOUS at 05:23

## 2020-05-29 RX ADMIN — Medication 10 MILLIGRAM(S): at 05:22

## 2020-05-29 NOTE — PROGRESS NOTE ADULT - SUBJECTIVE AND OBJECTIVE BOX
Hospital Day:  10d    Subjective:    Pt was interviewed and examined at the bedside in the AM. No acute events overnight.     Denies headaches, changes in vision, chest pains, SOB, n/v/d, abd pain, constipation, changes in urination, pain on urination, weakness, fatigue, joint pain or muscle pain.       Past Medical Hx:   Asthma  Urinary retention  Urinary calculi  Spastic quadriplegia  Osteoporosis  Seizure  Cerebral palsy  BPH (benign prostatic hyperplasia)    Past Sx:  Suprapubic catheter  History of suprapubic catheter  H/O cystoscopy  S/P percutaneous endoscopic gastrostomy (PEG) tube placement    Allergies:  No Known Allergies    Current Meds:   Standng Meds:  baclofen 10 milliGRAM(s) Oral three times a day  calcium carbonate   1250 mG (OsCal) 1 Tablet(s) Oral two times a day  calcium carbonate   1250 mG (OsCal) 1 Tablet(s) Oral daily  dicyclomine 10 milliGRAM(s) Oral daily  enoxaparin Injectable 40 milliGRAM(s) SubCutaneous daily  magnesium oxide 400 milliGRAM(s) Oral three times a day with meals  meropenem  IVPB      meropenem  IVPB 1000 milliGRAM(s) IV Intermittent every 8 hours  PHENobarbital 64.8 milliGRAM(s) Oral daily  polyethylene glycol 3350 17 Gram(s) Oral every 12 hours  senna 2 Tablet(s) Oral at bedtime  tamsulosin 0.4 milliGRAM(s) Oral at bedtime    PRN Meds:  acetaminophen   Tablet .. 650 milliGRAM(s) Oral every 6 hours PRN Temp greater or equal to 38C (100.4F)  ALBUTerol    90 MICROgram(s) HFA Inhaler 2 Puff(s) Inhalation every 6 hours PRN Shortness of Breath and/or Wheezing  benzonatate 100 milliGRAM(s) Oral every 8 hours PRN Cough  ondansetron Injectable 4 milliGRAM(s) IV Push every 6 hours PRN Nausea    HOME MEDICATIONS:  Artificial Tears ophthalmic solution: 1 drop(s) to each affected eye 4 times a day  baclofen 10 mg oral tablet: 1 tab(s) by gastrostomy tube 3 times a day  benzonatate 100 mg oral capsule: 1 cap(s) orally every 8 hours, As needed, Cough  dicyclomine 10 mg oral capsule: 1 cap(s) orally once a day  docusate sodium 60 mg/15 mL oral syrup: 100 milligram(s) by gastrostomy tube once a day, As Needed for constipation  guaifenesin-dextromethorphan 100 mg-10 mg/5 mL oral liquid: 5 milliliter(s) orally every 6 hours, As needed, Cough  magnesium oxide 400 mg (241.3 mg elemental magnesium) oral tablet: 1 tab(s) orally 3 times a day (with meals)  Oysco 500 (1250 mg calcium carbonate) oral tablet: 1 tab(s) by gastrostomy tube 2 times a day  PHENobarbital 64.8 mg oral tablet: 1 tab(s) orally once a day via G tube  senna oral tablet: 2 tab(s) orally once a day (at bedtime)  tamsulosin 0.4 mg oral capsule: 1 cap(s) orally once a day (at bedtime)  Ventolin HFA 90 mcg/inh inhalation aerosol: 2 puff(s) inhaled 4 times a day, As Needed      Vital Signs:   T(F): 96.7 (05-29-20 @ 09:10), Max: 98.5 (05-29-20 @ 00:48)  HR: 73 (05-29-20 @ 09:10) (68 - 86)  BP: 111/62 (05-29-20 @ 09:10) (100/58 - 116/71)  RR: 18 (05-29-20 @ 09:10) (16 - 18)  SpO2: 94% (05-29-20 @ 09:10) (93% - 96%)      05-28-20 @ 07:01  -  05-29-20 @ 07:00  --------------------------------------------------------  IN: 900 mL / OUT: 800 mL / NET: 100 mL        Physical Exam:   General: WDWN  HEENT: NC/AT; PERRL, clear conjunctiva  Neck: supple  Cardiovascular: +S1/S2; RRR  Respiratory: CTA b/l; no W/R/R  Gastrointestinal: soft, NT/ND; +BSx4, suprapubic catheter, nephrostomy tube,   Extremities: WWP; 2+ peripheral pulses; no edema   Neurological: AAOx3; contracted extremities, no focal deficits        Labs:                         12.9   4.01  )-----------( 303      ( 29 May 2020 06:48 )             37.7     Neutophil% 51.2, Lymphocyte% 28.2, Monocyte% 10.5, Bands% 1.2 05-29-20 @ 06:48    29 May 2020 06:48    138    |  98     |  14     ----------------------------<  108    4.3     |  27     |  0.5      Ca    9.2        29 May 2020 06:48  Mg     2.4       29 May 2020 06:48    TPro  6.2    /  Alb  3.1    /  TBili  0.2    /  DBili  x      /  AST  40     /  ALT  38     /  AlkPhos  79     28 May 2020 06:12      Chol --, LDL --, HDL --, VLDL --,  05-28-20 @ 11:41            Iron --, TIBC --, %Sat -- Ferritin 345 05-28-20 @ 11:41            Culture - Blood (collected 05-25-20 @ 07:10)  Source: .Blood None  Preliminary Report (05-26-20 @ 12:01):    No growth to date.        Radiology:

## 2020-05-29 NOTE — PROGRESS NOTE ADULT - GUM GEN PE MLT EXAM PC
Normal genitalia; no lesions; no discharge
detailed exam
detailed exam
Normal genitalia; no lesions; no discharge
detailed exam
Normal genitalia; no lesions; no discharge
Normal genitalia; no lesions; no discharge

## 2020-05-29 NOTE — PROGRESS NOTE ADULT - SUBJECTIVE AND OBJECTIVE BOX
Patient is a 64y old  Male who presents with a chief complaint of sepsis (24 May 2020 08:45)    INTERVAL HPI/OVERNIGHT EVENTS: no complaints, feels well  ROS: Denies CP, SOB, AP, new weakness  All other systems reviewed and are within normal limits.  InitialHPI:  Patient is a 65 y/o male with PMH of cerebral palsy, seizure disorder, spastic paraplegia, s/p PEG tube, Asthma, H/O nephrolithiasis, BPH with bladder neck stricture s/p suprapubic catheter, and H/O Pseudomonas and ESBL Proteus mirabilis bacteruria who presented from a group with abdominal pain x1 days. Pt states that he started getting a R sided flank pain with radiation to his pelvis after getting his suprapubic cathter tube changes. Pt states that the pain is sharp and constant. Pt also endorses some nausea and vomited x1 in the AM. He also has some pain at the catheter insertion site however catheter has been draining well. Off note pt has a progressive worsening congestion for the last few days. As per aid, pts group home had a resident test postive for COVID. Pt was likely in close contact with resident. Pt also endorses recent subjective fevers. He has had a cough for the last month, pt was recently admitted to the ICU for non-COVID coronavirus bronchitis, never intubated. Denies any other complaints. Denies any chills, changes in weight, chest pain, SOB, diarrhea, myalgias, arthralgias syncope, fatigue.   In the ED, /67  RR 18 SpO2 94 on RA afebrile. CT A/P showed a [id number] proximal right ureteral stone with mod hydronephrosis. Urolgoy was consulted. Pt was given IVF, pain control and Merox1. Swabbed for COVID. (19 May 2020 08:44)    KIDNEY STONE;HYDRONEPHROSIS;URINARY TRACT INFECTION  ^KIDNEY STONE;HYDRONEPHROSIS;URINARY TRACT INFECTION  Family history unknown  No pertinent family history in first degree relatives  Handoff  MEWS Score  Asthma  Urinary retention  Urinary calculi  Spastic quadriplegia  Osteoporosis  Seizure  Cerebral palsy  BPH (benign prostatic hyperplasia)  Kidney stone  Suprapubic catheter  History of suprapubic catheter  H/O cystoscopy  S/P percutaneous endoscopic gastrostomy (PEG) tube placement  CATHETER PROBLEM  52  Urinary tract infection  Hydronephrosis    PAST MEDICAL & SURGICAL HISTORY:  Asthma  Urinary retention  Urinary calculi  Spastic quadriplegia  Osteoporosis  Seizure: last seizure &gt;10 years ago  Cerebral palsy  BPH (benign prostatic hyperplasia)  Suprapubic catheter  H/O cystoscopy  S/P percutaneous endoscopic gastrostomy (PEG) tube placement      General: NAD, AAO3, mild dysarthria  CV: S1 S2  Resp: decreased breath sounds at bases  GI: NT/ND/S +BS, +PEG, +suprapubic cath +Rt post urostomy  MS: contracted b/l UE and LE, spastic quadriplegia            MEDICATIONS  (STANDING):  baclofen 10 milliGRAM(s) Oral three times a day  calcium carbonate   1250 mG (OsCal) 1 Tablet(s) Oral two times a day  calcium carbonate   1250 mG (OsCal) 1 Tablet(s) Oral daily  dicyclomine 10 milliGRAM(s) Oral daily  enoxaparin Injectable 40 milliGRAM(s) SubCutaneous daily  magnesium oxide 400 milliGRAM(s) Oral three times a day with meals  meropenem  IVPB      meropenem  IVPB 1000 milliGRAM(s) IV Intermittent every 8 hours  PHENobarbital 64.8 milliGRAM(s) Oral daily  polyethylene glycol 3350 17 Gram(s) Oral every 12 hours  senna 2 Tablet(s) Oral at bedtime  tamsulosin 0.4 milliGRAM(s) Oral at bedtime    MEDICATIONS  (PRN):  acetaminophen   Tablet .. 650 milliGRAM(s) Oral every 6 hours PRN Temp greater or equal to 38C (100.4F)  ALBUTerol    90 MICROgram(s) HFA Inhaler 2 Puff(s) Inhalation every 6 hours PRN Shortness of Breath and/or Wheezing  benzonatate 100 milliGRAM(s) Oral every 8 hours PRN Cough  ondansetron Injectable 4 milliGRAM(s) IV Push every 6 hours PRN Nausea    Home Medications:  Artificial Tears ophthalmic solution: 1 drop(s) to each affected eye 4 times a day (20 Mar 2020 21:56)  baclofen 10 mg oral tablet: 1 tab(s) by gastrostomy tube 3 times a day (20 Mar 2020 21:56)  benzonatate 100 mg oral capsule: 1 cap(s) orally every 8 hours, As needed, Cough (28 May 2020 09:22)  dicyclomine 10 mg oral capsule: 1 cap(s) orally once a day (28 May 2020 09:22)  docusate sodium 60 mg/15 mL oral syrup: 100 milligram(s) by gastrostomy tube once a day, As Needed for constipation (20 Mar 2020 21:56)  guaifenesin-dextromethorphan 100 mg-10 mg/5 mL oral liquid: 5 milliliter(s) orally every 6 hours, As needed, Cough (27 Mar 2020 11:14)  magnesium oxide 400 mg (241.3 mg elemental magnesium) oral tablet: 1 tab(s) orally 3 times a day (with meals) (28 May 2020 09:22)  Oysco 500 (1250 mg calcium carbonate) oral tablet: 1 tab(s) by gastrostomy tube 2 times a day (20 Mar 2020 21:56)  PHENobarbital 64.8 mg oral tablet: 1 tab(s) orally once a day via G tube (20 Mar 2020 21:56)  senna oral tablet: 2 tab(s) orally once a day (at bedtime) (28 May 2020 09:22)  tamsulosin 0.4 mg oral capsule: 1 cap(s) orally once a day (at bedtime) (28 May 2020 09:22)  Ventolin HFA 90 mcg/inh inhalation aerosol: 2 puff(s) inhaled 4 times a day, As Needed (20 Mar 2020 21:56)    Vital Signs Last 24 Hrs  T(C): 35.9 (29 May 2020 09:10), Max: 36.9 (29 May 2020 00:48)  T(F): 96.7 (29 May 2020 09:10), Max: 98.5 (29 May 2020 00:48)  HR: 73 (29 May 2020 09:10) (68 - 86)  BP: 111/62 (29 May 2020 09:10) (100/58 - 116/71)  BP(mean): --  RR: 18 (29 May 2020 09:10) (16 - 18)  SpO2: 94% (29 May 2020 09:10) (93% - 96%)  CAPILLARY BLOOD GLUCOSE        LABS:                        12.9   4.01  )-----------( 303      ( 29 May 2020 06:48 )             37.7     05-29    138  |  98  |  14  ----------------------------<  108<H>  4.3   |  27  |  0.5<L>    Ca    9.2      29 May 2020 06:48  Mg     2.4     05-29    TPro  6.2  /  Alb  3.1<L>  /  TBili  0.2  /  DBili  x   /  AST  40  /  ALT  38  /  AlkPhos  79  05-28    LIVER FUNCTIONS - ( 28 May 2020 06:12 )  Alb: 3.1 g/dL / Pro: 6.2 g/dL / ALK PHOS: 79 U/L / ALT: 38 U/L / AST: 40 U/L / GGT: x                           Consultant Notes Reviewed:  [x ] YES  [ ] NO  Care Discussed with Consultants/Other Providers/ Housestaff [ x] YES  [ ] NO  Radiology, labs, new studies personally reviewed.

## 2020-05-29 NOTE — PROGRESS NOTE ADULT - ASSESSMENT
Patient is a 65 y/o male with PMH of cerebral palsy, seizure disorder, spastic paraplegia, s/p PEG tube, Asthma, H/O nephrolithiasis, BPH with bladder neck stricture s/p suprapubic catheter, and H/O Pseudomonas and ESBL Proteus mirabilis bacteruria who presented from a group with abdominal pain x1 days.     #COVID 19 +ve PCR on 5/27  -pt was swapped for disposition does not have fever, cough or SOB, stable on RA  - transfer to COVID floor  - inflammatory makers unremarkable   - Repeat COVID Swab tomorrow     #urosepsis (resolving) 2/2 pyelonephritis with hydronephrosis and ureter stones with proteus ESBL bacteruria and bacteremia   - s/p R PCT nephrostomy with stent on 5/20, and Percutaneous nephrolithotomy (PCNL) 5/26,   - no further intervention by urology  - Pain control with Toradol PRN, monitor renal function   - c/w Tamsulosin and Bentyl to facilitate passage   - c/w Meropenem (proteus ESBL bacteruria and bacteremia), repeat Bcx negative on 5/22,on discharge Ertapenem 1 gram QD to complete 14 days ends on 6/8/2020  - monitor UO through right nephrostomy drainage, once zero for the next 24 hours, can be removed  - suprapubic catherter change Q4 weeks, due to change in 2 weeks       #lactic acidosis: (resolved) possibly seizure after nephrostomy  with subtherapeutic phenobarb level  - phenobarb level is low, REEG  no seizure, just slowing, as per Neuro (spoke with Dr. Shipley), no dose adjustment needed,   - lactate level normalized  # acute normocytic anemia: Hbg stable, likely dilutional, no signs of blood loss, monitor Hbg and keep active type and screening   # chronic Cough is unlikely from  bronchitis   # Seizure disorder - Continue with phenobarbital 64.8mg PO through PEG tube QD  # Cerebral palsy w/ Spastic quadriplegia - Continue with baclofen 10mg PO three times a day, c/w PEG feeds   # Functional quadriplegia - full care  # Asthma - No wheezing on exam, on RA, Albuterol PRN   # BPH with bladder neck stricture s/p suprapubic catheter - Tube replaced day before admission, Chronic colonization w/ ESBL Proteus Mirabilis and Pseudomonas on last admission,    DVT Prophylaxis: Lovenox 40mg SQ once daily,   GI Prophylaxis: Not indicated   Diet: through PEG tube feeding  Activity: bed rest   Dispo: [facility], , Leslie ([phone]) is the sister and health care proxy, Mitch is the nurse at [facility] [employer] [phone],   Code Status: Full Code  Dispo: from group home, now TBD as covid +ve

## 2020-05-29 NOTE — PROGRESS NOTE ADULT - ASSESSMENT
Patient is a 65 y/o male with PMH of cerebral palsy, seizure disorder, spastic paraplegia, s/p PEG tube, Asthma, H/O nephrolithiasis, BPH with bladder neck stricture s/p suprapubic catheter, and H/O Pseudomonas and ESBL Proteus mirabilis bacteruria who presented from a group with abdominal pain x1 days.       #Sepsis due to UTI (resolving) 2/2 pyelonephritis with hydronephrosis and ureter stones with proteus ESBL bacteruria and bacteremia   - s/p R PCT nephrostomy with stent on 5/20  - s/p Percutaneous nephrolithotomy (PCNL) for stone, stent removal  - c/w Meropenem (proteus ESBL bacteruria and bacteremia), Ertapenem on d/c until 6/8/20  -remove urostomy tube today as no output in the last 48 hrs    #+COVID swab (done only so he can return to custodial) ?False Positive  -pt a-Sx  -repeat COVID as high suspicion the result is False Positive    #lactic acidosis: (reolved) possibly seizure after nephrostomy  with subtherapeutic phenobarb level  - phenobarb level is low, REEG  no seizure, just slowing, as per Neuro (spoke with Dr. Shipley), no dose adjustment needed,   - lactate level normalized    #normocytic anemia: Hbg stable, likely dilutional, no signs of blood loss, monitor Hbg and keep active type and screening     #Abd distention: resolved   - PEG tube feeding  - bowel regimen     # Seizure disorder - Continue with phenobarbital 64.8mg PO through PEG tube QD (current level is ok as per neuro)    # Cerebral palsy w/ Spastic quadriplegia - Continue with baclofen 10mg PO three times a day, c/w PEG feeds     # Functional quadriplegia - full care    # Asthma - No wheezing on exam, on RA, Albuterol PRN     # BPH with bladder neck stricture s/p suprapubic catheter - Tube replaced day before admission, Chronic colonization w/ ESBL Proteus Mirabilis, grew Pseudomonas on last admission,    #DVT Prophylaxis: Lovenox 40mg SQ once daily,     #GI Prophylaxis: Not indicated     #Diet: through PEG tube feeding. NPO     Activity: bed rest     Dispo: [facility], , Leslie ([phone]) is the sister and health care proxy, Mitch is the nurse at custodial [phone],   Code Status: Full Code    #Progress Note Handoff  Pending (specify): Once Group Home can take pt back and Negative Covid test  Pt/Family discussion: Pt/sister informed and agree with the current plan  Disposition: Home__x____SNF_______To be determined________ .

## 2020-05-29 NOTE — PROGRESS NOTE ADULT - SUBJECTIVE AND OBJECTIVE BOX
HAWATISH SCHUSTER  64y, Male    All available historical data reviewed    OVERNIGHT EVENTS:  no fevers  feels well and has no complaints   no cough/SOB  94% RA    ROS:  limited    VITALS:  T(F): 98.1, Max: 98.5 (05-29-20 @ 00:48)  HR: 70  BP: 101/64  RR: 16Vital Signs Last 24 Hrs  T(C): 36.7 (29 May 2020 06:05), Max: 36.9 (29 May 2020 00:48)  T(F): 98.1 (29 May 2020 06:05), Max: 98.5 (29 May 2020 00:48)  HR: 70 (29 May 2020 06:05) (68 - 86)  BP: 101/64 (29 May 2020 06:05) (100/58 - 116/71)  BP(mean): --  RR: 16 (29 May 2020 06:05) (16 - 18)  SpO2: 94% (29 May 2020 07:54) (90% - 96%)    TESTS & MEASUREMENTS:                        12.9   4.01  )-----------( 303      ( 29 May 2020 06:48 )             37.7     05-29    138  |  98  |  14  ----------------------------<  108<H>  4.3   |  27  |  0.5<L>    Ca    9.2      29 May 2020 06:48  Mg     2.4     05-29    TPro  6.2  /  Alb  3.1<L>  /  TBili  0.2  /  DBili  x   /  AST  40  /  ALT  38  /  AlkPhos  79  05-28    LIVER FUNCTIONS - ( 28 May 2020 06:12 )  Alb: 3.1 g/dL / Pro: 6.2 g/dL / ALK PHOS: 79 U/L / ALT: 38 U/L / AST: 40 U/L / GGT: x             Culture - Blood (collected 05-25-20 @ 07:10)  Source: .Blood None  Preliminary Report (05-26-20 @ 12:01):    No growth to date.            RADIOLOGY & ADDITIONAL TESTS:  Personal review of radiological diagnostics performed  Echo and EKG results noted when applicable.     MEDICATIONS:  acetaminophen   Tablet .. 650 milliGRAM(s) Oral every 6 hours PRN  ALBUTerol    90 MICROgram(s) HFA Inhaler 2 Puff(s) Inhalation every 6 hours PRN  baclofen 10 milliGRAM(s) Oral three times a day  benzonatate 100 milliGRAM(s) Oral every 8 hours PRN  calcium carbonate   1250 mG (OsCal) 1 Tablet(s) Oral two times a day  calcium carbonate   1250 mG (OsCal) 1 Tablet(s) Oral daily  dicyclomine 10 milliGRAM(s) Oral daily  enoxaparin Injectable 40 milliGRAM(s) SubCutaneous daily  magnesium oxide 400 milliGRAM(s) Oral three times a day with meals  meropenem  IVPB      meropenem  IVPB 1000 milliGRAM(s) IV Intermittent every 8 hours  ondansetron Injectable 4 milliGRAM(s) IV Push every 6 hours PRN  PHENobarbital 64.8 milliGRAM(s) Oral daily  polyethylene glycol 3350 17 Gram(s) Oral every 12 hours  senna 2 Tablet(s) Oral at bedtime  tamsulosin 0.4 milliGRAM(s) Oral at bedtime      ANTIBIOTICS:  meropenem  IVPB      meropenem  IVPB 1000 milliGRAM(s) IV Intermittent every 8 hours

## 2020-05-29 NOTE — PROGRESS NOTE ADULT - ASSESSMENT
· Assessment		  63 y/o male with PMH of cerebral palsy, seizure disorder, spastic paraplegia, s/p PEG tube, Asthma, H/O nephrolithiasis, BPH with bladder neck stricture s/p suprapubic catheter, and H/O Pseudomonas and ESBL Proteus mirabilis bacteruria who presented from a group with abdominal pain x1 days found to have R sided nephrolithiasis with hydronephrosis. Pt also has recent URI symptoms r/o COVID.     IMPRESSION;   Resolved sepsis secondary to acute Right pyelonephritis with right hydronephrosis with right ureteral stone  S/p R PCN by IVR 5/20  IVR Procedure: 5/26   Over-the-wire right nephrostogram and removal of right nephroureteral catheter  BCx 5/19 CoNS ( not a true pathogen)  BCx 5/20  P mirabilis ESBl  BCx 5/22 NG  UCx 5/19 P mirabilis ESBL  CT A/P - right hydronephrosis with a 9 x 7 x 10 mm proximal right ureteral calculus and ureteral enhancement   PROCEDURES:  Nephrostogram 26-May-2020 15:34:10 right Bonnie Cooper  Replacement of external ureteral stent 26-May-2020 15:34:02 right Bonnie Cooper  Antegrade ureteroscopy 26-May-2020 15:33:44 right Bonnie Cooper  Percutaneous removal of calculus of kidney, 2 cm or more in diameter 26-May-  COVID19 POSITIVE 5/27 after being repeatedly NG  Do not see any evidence of active disease  CT abdomen 5/27 : left pleural effusion. No ground glass opacities  Clinically, radiographically, lack of hypoxemia all go towards a false positive reaction with no active disease    RECOMMENDATIONS;  home on ertapenem 1 gm iv q24h for 14 days starting 5/26. Till then continue with meropenem  repeat COVID-19 on 6/1,2

## 2020-05-29 NOTE — PROGRESS NOTE ADULT - RESPIRATORY
Breath Sounds equal & clear to percussion & auscultation, no accessory muscle use
Breath Sounds equal & clear to percussion & auscultation, no accessory muscle use
detailed exam
Breath Sounds equal & clear to percussion & auscultation, no accessory muscle use

## 2020-05-29 NOTE — PROGRESS NOTE ADULT - GASTROINTESTINAL
Soft, non-tender, no hepatosplenomegaly, normal bowel sounds
detailed exam
detailed exam

## 2020-05-29 NOTE — DISCHARGE NOTE NURSING/CASE MANAGEMENT/SOCIAL WORK - PATIENT PORTAL LINK FT
You can access the FollowMyHealth Patient Portal offered by [facility] by registering at the following website: [email or web address]. By joining [employer]’s FollowMyHealth portal, you will also be able to view your health information using other applications (apps) compatible with our system.

## 2020-05-30 LAB
ALBUMIN SERPL ELPH-MCNC: 3.3 G/DL — LOW (ref 3.5–5.2)
ALP SERPL-CCNC: 86 U/L — SIGNIFICANT CHANGE UP (ref 30–115)
ALT FLD-CCNC: 48 U/L — HIGH (ref 0–41)
ANION GAP SERPL CALC-SCNC: 12 MMOL/L — SIGNIFICANT CHANGE UP (ref 7–14)
AST SERPL-CCNC: 43 U/L — HIGH (ref 0–41)
BASOPHILS # BLD AUTO: 0.02 K/UL — SIGNIFICANT CHANGE UP (ref 0–0.2)
BASOPHILS NFR BLD AUTO: 0.5 % — SIGNIFICANT CHANGE UP (ref 0–1)
BILIRUB SERPL-MCNC: 0.2 MG/DL — SIGNIFICANT CHANGE UP (ref 0.2–1.2)
BUN SERPL-MCNC: 16 MG/DL — SIGNIFICANT CHANGE UP (ref 10–20)
CALCIUM SERPL-MCNC: 9 MG/DL — SIGNIFICANT CHANGE UP (ref 8.5–10.1)
CHLORIDE SERPL-SCNC: 100 MMOL/L — SIGNIFICANT CHANGE UP (ref 98–110)
CO2 SERPL-SCNC: 27 MMOL/L — SIGNIFICANT CHANGE UP (ref 17–32)
CREAT SERPL-MCNC: 0.5 MG/DL — LOW (ref 0.7–1.5)
CULTURE RESULTS: SIGNIFICANT CHANGE UP
EOSINOPHIL # BLD AUTO: 0.25 K/UL — SIGNIFICANT CHANGE UP (ref 0–0.7)
EOSINOPHIL NFR BLD AUTO: 6.1 % — SIGNIFICANT CHANGE UP (ref 0–8)
GLUCOSE SERPL-MCNC: 129 MG/DL — HIGH (ref 70–99)
HCT VFR BLD CALC: 35.3 % — LOW (ref 42–52)
HGB BLD-MCNC: 11.9 G/DL — LOW (ref 14–18)
IMM GRANULOCYTES NFR BLD AUTO: 1.2 % — HIGH (ref 0.1–0.3)
LYMPHOCYTES # BLD AUTO: 1.01 K/UL — LOW (ref 1.2–3.4)
LYMPHOCYTES # BLD AUTO: 24.5 % — SIGNIFICANT CHANGE UP (ref 20.5–51.1)
MCHC RBC-ENTMCNC: 32.4 PG — HIGH (ref 27–31)
MCHC RBC-ENTMCNC: 33.7 G/DL — SIGNIFICANT CHANGE UP (ref 32–37)
MCV RBC AUTO: 96.2 FL — HIGH (ref 80–94)
MONOCYTES # BLD AUTO: 0.41 K/UL — SIGNIFICANT CHANGE UP (ref 0.1–0.6)
MONOCYTES NFR BLD AUTO: 9.9 % — HIGH (ref 1.7–9.3)
NEUTROPHILS # BLD AUTO: 2.39 K/UL — SIGNIFICANT CHANGE UP (ref 1.4–6.5)
NEUTROPHILS NFR BLD AUTO: 57.8 % — SIGNIFICANT CHANGE UP (ref 42.2–75.2)
NRBC # BLD: 0 /100 WBCS — SIGNIFICANT CHANGE UP (ref 0–0)
PLATELET # BLD AUTO: 305 K/UL — SIGNIFICANT CHANGE UP (ref 130–400)
POTASSIUM SERPL-MCNC: 4.4 MMOL/L — SIGNIFICANT CHANGE UP (ref 3.5–5)
POTASSIUM SERPL-SCNC: 4.4 MMOL/L — SIGNIFICANT CHANGE UP (ref 3.5–5)
PROT SERPL-MCNC: 6.6 G/DL — SIGNIFICANT CHANGE UP (ref 6–8)
RBC # BLD: 3.67 M/UL — LOW (ref 4.7–6.1)
RBC # FLD: 14.6 % — HIGH (ref 11.5–14.5)
SARS-COV-2 RNA SPEC QL NAA+PROBE: SIGNIFICANT CHANGE UP
SODIUM SERPL-SCNC: 139 MMOL/L — SIGNIFICANT CHANGE UP (ref 135–146)
SPECIMEN SOURCE: SIGNIFICANT CHANGE UP
WBC # BLD: 4.13 K/UL — LOW (ref 4.8–10.8)
WBC # FLD AUTO: 4.13 K/UL — LOW (ref 4.8–10.8)

## 2020-05-30 PROCEDURE — 99233 SBSQ HOSP IP/OBS HIGH 50: CPT

## 2020-05-30 RX ADMIN — Medication 64.8 MILLIGRAM(S): at 12:15

## 2020-05-30 RX ADMIN — POLYETHYLENE GLYCOL 3350 17 GRAM(S): 17 POWDER, FOR SOLUTION ORAL at 17:30

## 2020-05-30 RX ADMIN — POLYETHYLENE GLYCOL 3350 17 GRAM(S): 17 POWDER, FOR SOLUTION ORAL at 05:21

## 2020-05-30 RX ADMIN — Medication 1 TABLET(S): at 12:15

## 2020-05-30 RX ADMIN — Medication 10 MILLIGRAM(S): at 12:15

## 2020-05-30 RX ADMIN — MEROPENEM 100 MILLIGRAM(S): 1 INJECTION INTRAVENOUS at 05:21

## 2020-05-30 RX ADMIN — MAGNESIUM OXIDE 400 MG ORAL TABLET 400 MILLIGRAM(S): 241.3 TABLET ORAL at 05:21

## 2020-05-30 RX ADMIN — Medication 10 MILLIGRAM(S): at 21:27

## 2020-05-30 RX ADMIN — ENOXAPARIN SODIUM 40 MILLIGRAM(S): 100 INJECTION SUBCUTANEOUS at 12:15

## 2020-05-30 RX ADMIN — Medication 10 MILLIGRAM(S): at 05:21

## 2020-05-30 RX ADMIN — Medication 1 TABLET(S): at 17:30

## 2020-05-30 RX ADMIN — MEROPENEM 100 MILLIGRAM(S): 1 INJECTION INTRAVENOUS at 12:15

## 2020-05-30 RX ADMIN — Medication 1 TABLET(S): at 05:21

## 2020-05-30 RX ADMIN — MAGNESIUM OXIDE 400 MG ORAL TABLET 400 MILLIGRAM(S): 241.3 TABLET ORAL at 17:30

## 2020-05-30 RX ADMIN — MEROPENEM 100 MILLIGRAM(S): 1 INJECTION INTRAVENOUS at 21:27

## 2020-05-30 RX ADMIN — SENNA PLUS 2 TABLET(S): 8.6 TABLET ORAL at 21:27

## 2020-05-30 RX ADMIN — MAGNESIUM OXIDE 400 MG ORAL TABLET 400 MILLIGRAM(S): 241.3 TABLET ORAL at 12:15

## 2020-05-30 NOTE — PROGRESS NOTE ADULT - ASSESSMENT
· Assessment		  63 y/o male with PMH of cerebral palsy, seizure disorder, spastic paraplegia, s/p PEG tube, Asthma, H/O nephrolithiasis, BPH with bladder neck stricture s/p suprapubic catheter, and H/O Pseudomonas and ESBL Proteus mirabilis bacteruria who presented from a group with abdominal pain x1 days found to have R sided nephrolithiasis with hydronephrosis. Pt also has recent URI symptoms r/o COVID.     IMPRESSION;   Resolved sepsis secondary to acute Right pyelonephritis with right hydronephrosis with right ureteral stone  S/p R PCN by IVR 5/20  IVR Procedure: 5/26   Over-the-wire right nephrostogram and removal of right nephroureteral catheter  BCx 5/19 CoNS ( not a true pathogen)  BCx 5/20  P mirabilis ESBl  BCx 5/22 NG  UCx 5/19 P mirabilis ESBL  CT A/P - right hydronephrosis with a 9 x 7 x 10 mm proximal right ureteral calculus and ureteral enhancement   PROCEDURES:  Nephrostogram 26-May-2020 15:34:10 right Bonnie Cooper  Replacement of external ureteral stent 26-May-2020 15:34:02 right Bonnie Cooper  Antegrade ureteroscopy 26-May-2020 15:33:44 right Bonnie Cooper  Percutaneous removal of calculus of kidney, 2 cm or more in diameter 26-May-  COVID19 POSITIVE 5/27 after being repeatedly NG  COVID19 NG 5/29  PROBABLY FALSE POSTIVE ON 5/27  Do not see any evidence of active disease  CT abdomen 5/27 : left pleural effusion. No ground glass opacities  Clinically, radiographically, lack of hypoxemia all go towards a false positive reaction with no active disease    RECOMMENDATIONS;  home on ertapenem 1 gm iv q24h for 14 days starting 5/26. Till then continue with meropenem  repeat COVID-19

## 2020-05-30 NOTE — PROGRESS NOTE ADULT - SUBJECTIVE AND OBJECTIVE BOX
TISH SHAIKH  64y, Male    All available historical data reviewed    OVERNIGHT EVENTS:    no fevers  clinically no change  ROS:  unable to obtain history secondary to patient's mental status and/or sedation     VITALS:  T(F): 97.8, Max: 98.7 (05-29-20 @ 21:00)  HR: 77  BP: 105/69  RR: 18Vital Signs Last 24 Hrs  T(C): 36.6 (30 May 2020 08:46), Max: 37.1 (29 May 2020 21:00)  T(F): 97.8 (30 May 2020 08:46), Max: 98.7 (29 May 2020 21:00)  HR: 77 (30 May 2020 08:46) (55 - 77)  BP: 105/69 (30 May 2020 08:46) (101/55 - 124/74)  BP(mean): --  RR: 18 (30 May 2020 08:46) (18 - 18)  SpO2: 96% (30 May 2020 07:51) (94% - 99%)    TESTS & MEASUREMENTS:                        11.9   4.13  )-----------( 305      ( 30 May 2020 07:36 )             35.3     05-30    139  |  100  |  16  ----------------------------<  129<H>  4.4   |  27  |  0.5<L>    Ca    9.0      30 May 2020 07:36  Mg     2.4     05-29    TPro  6.6  /  Alb  3.3<L>  /  TBili  0.2  /  DBili  x   /  AST  43<H>  /  ALT  48<H>  /  AlkPhos  86  05-30    LIVER FUNCTIONS - ( 30 May 2020 07:36 )  Alb: 3.3 g/dL / Pro: 6.6 g/dL / ALK PHOS: 86 U/L / ALT: 48 U/L / AST: 43 U/L / GGT: x             Culture - Blood (collected 05-25-20 @ 07:10)  Source: .Blood None  Preliminary Report (05-26-20 @ 12:01):    No growth to date.            RADIOLOGY & ADDITIONAL TESTS:  Personal review of radiological diagnostics performed  Echo and EKG results noted when applicable.     MEDICATIONS:  acetaminophen   Tablet .. 650 milliGRAM(s) Oral every 6 hours PRN  ALBUTerol    90 MICROgram(s) HFA Inhaler 2 Puff(s) Inhalation every 6 hours PRN  baclofen 10 milliGRAM(s) Oral three times a day  benzonatate 100 milliGRAM(s) Oral every 8 hours PRN  calcium carbonate   1250 mG (OsCal) 1 Tablet(s) Oral two times a day  calcium carbonate   1250 mG (OsCal) 1 Tablet(s) Oral daily  dicyclomine 10 milliGRAM(s) Oral daily  enoxaparin Injectable 40 milliGRAM(s) SubCutaneous daily  magnesium oxide 400 milliGRAM(s) Oral three times a day with meals  meropenem  IVPB      meropenem  IVPB 1000 milliGRAM(s) IV Intermittent every 8 hours  ondansetron Injectable 4 milliGRAM(s) IV Push every 6 hours PRN  PHENobarbital 64.8 milliGRAM(s) Oral daily  polyethylene glycol 3350 17 Gram(s) Oral every 12 hours  senna 2 Tablet(s) Oral at bedtime  tamsulosin 0.4 milliGRAM(s) Oral at bedtime      ANTIBIOTICS:  meropenem  IVPB      meropenem  IVPB 1000 milliGRAM(s) IV Intermittent every 8 hours

## 2020-05-30 NOTE — PROGRESS NOTE ADULT - SUBJECTIVE AND OBJECTIVE BOX
TISH SHAIKH  [facility] T2-3A 024 B ([facility] T2-3A)            Patient was evaluated and examined  by bedside, no active events over night time.                 REVIEW OF SYSTEMS:  unable to obtain due to patient's chronic intellectual disability       T(C): , Max: 37.1 (05-29-20 @ 21:00)  HR: 70 (05-30-20 @ 12:53)  BP: 102/58 (05-30-20 @ 12:53)  RR: 18 (05-30-20 @ 12:53)  SpO2: 94% (05-30-20 @ 12:42)  CAPILLARY BLOOD GLUCOSE          PHYSICAL EXAM:  General: NAD, Awake , patient is laying comfortably in bed  HEENT: AT, NC, Supple, NO JVD, NO CB  Lungs: CTA B/L, no wheezing, no rhonchi  CVS: normal S1, S2, RRR, NO M/G/R  Abdomen: soft, bowel sounds present, non-tender, non-distended, SPC present  Extremities: no edema, no clubbing, no cyanosis, positive peripheral pulses b/l, peripheral muscle wasting  Neuro: chronic spastic all extremities  Skin: no rash, no ecchymosis      LAB  CBC  Date: 05-30-20 @ 07:36  Mean cell Zfxjezzfda94.4  Mean cell Hemoglobin Conc33.7  Mean cell Volum 96.2  Platelet count-Automate 305  RBC Count 3.67  Red Cell Distrib Width14.6  WBC Count4.13  % Albumin, Urine--  Hematocrit 35.3  Hemoglobin 11.9  CBC  Date: 05-29-20 @ 06:48  Mean cell Tahidflzzx99.7  Mean cell Hemoglobin Conc34.2  Mean cell Volum 95.4  Platelet count-Automate 303  RBC Count 3.95  Red Cell Distrib Width14.6  WBC Count4.01  % Albumin, Urine--  Hematocrit 37.7  Hemoglobin 12.9  CBC  Date: 05-28-20 @ 06:12  Mean cell Jlzxdwdsrs00.2  Mean cell Hemoglobin Conc33.8  Mean cell Volum 95.1  Platelet count-Automate 274  RBC Count 3.48  Red Cell Distrib Width14.6  WBC Count4.00  % Albumin, Urine--  Hematocrit 33.1  Hemoglobin 11.2  CBC  Date: 05-27-20 @ 04:45  Mean cell Nhcgerumto93.3  Mean cell Hemoglobin Conc33.0  Mean cell Volum 94.7  Platelet count-Automate 222  RBC Count 3.42  Red Cell Distrib Width14.1  WBC Count5.85  % Albumin, Urine--  Hematocrit 32.4  Hemoglobin 10.7  CBC  Date: 05-26-20 @ 05:56  Mean cell Ybuyqayzgu78.2  Mean cell Hemoglobin Conc32.9  Mean cell Volum 95.1  Platelet count-Automate 208  RBC Count 3.65  Red Cell Distrib Width14.1  WBC Count4.96  % Albumin, Urine--  Hematocrit 34.7  Hemoglobin 11.4  CBC  Date: 05-25-20 @ 07:10  Mean cell Xyhvonjiqm85.2  Mean cell Hemoglobin Conc33.9  Mean cell Volum 95.0  Platelet count-Automate 191  RBC Count 3.60  Red Cell Distrib Width13.9  WBC Count4.38  % Albumin, Urine--  Hematocrit 34.2  Hemoglobin 11.6  CBC  Date: 05-24-20 @ 07:06  Mean cell Nbvmtwicmk61.3  Mean cell Hemoglobin Conc33.2  Mean cell Volum 94.1  Platelet count-Automate 172  RBC Count 3.58  Red Cell Distrib Width13.8  WBC Count3.83  % Albumin, Urine--  Hematocrit 33.7  Hemoglobin 11.2    [facility]  05-30-20 @ 07:36  Blood Gas Arterial-Calcium,Ionized--  Blood Urea Nitrogen, Serum 16 mg/dL [10 - 20]  Carbon Dioxide, Serum27 mmol/L [17 - 32]  Chloride, Jxddp142 mmol/L [98 - 110]  Creatinie, Serum0.5 mg/dL<L> [0.7 - 1.5]  Glucose, Sniui625 mg/dL<H> [70 - 99]  Potassium, Serum4.4 mmol/L [3.5 - 5.0]  Sodium, Serum 139 mmol/L [135 - 146]  [facility]  05-29-20 @ 06:48  Blood Gas Arterial-Calcium,Ionized--  Blood Urea Nitrogen, Serum 14 mg/dL [10 - 20]  Carbon Dioxide, Serum27 mmol/L [17 - 32]  Chloride, Serum98 mmol/L [98 - 110]  Creatinie, Serum0.5 mg/dL<L> [0.7 - 1.5]  Glucose, Jgcvg786 mg/dL<H> [70 - 99]  Potassium, Serum4.3 mmol/L [3.5 - 5.0]  Sodium, Serum 138 mmol/L [135 - 146]  [facility]  05-28-20 @ 06:12  Blood Gas Arterial-Calcium,Ionized--  Blood Urea Nitrogen, Serum 12 mg/dL [10 - 20]  Carbon Dioxide, Serum25 mmol/L [17 - 32]  Chloride, Dbulk677 mmol/L [98 - 110]  Creatinie, Serum0.5 mg/dL<L> [0.7 - 1.5]  Glucose, Serum89 mg/dL [70 - 99]  Potassium, Serum4.7 mmol/L [3.5 - 5.0] [Slighty Hemolyzed use with Caution]  Sodium, Serum 139 mmol/L [135 - 146]  BMP  05-27-20 @ 04:45  Blood Gas Arterial-Calcium,Ionized--  Blood Urea Nitrogen, Serum 12 mg/dL [10 - 20]  Carbon Dioxide, Serum25 mmol/L [17 - 32]  Chloride, Jkagw152 mmol/L [98 - 110]  Creatinie, Serum<0.5 mg/dL<L> [0.7 - 1.5]  Glucose, Serum95 mg/dL [70 - 99]  Potassium, Serum4.3 mmol/L [3.5 - 5.0]  Sodium, Serum 139 mmol/L [135 - 146]  [facility]  05-26-20 @ 05:56  Blood Gas Arterial-Calcium,Ionized--  Blood Urea Nitrogen, Serum 11 mg/dL [10 - 20]  Carbon Dioxide, Serum28 mmol/L [17 - 32]  Chloride, Unecp751 mmol/L [98 - 110]  Creatinie, Serum<0.5 mg/dL<L> [0.7 - 1.5]  Glucose, Serum88 mg/dL [70 - 99]  Potassium, Serum4.9 mmol/L [3.5 - 5.0]  Sodium, Serum 139 mmol/L [135 - 146]              Microbiology:    Culture - Blood (collected 05-25-20 @ 07:10)  Source: .Blood None  Final Report (05-30-20 @ 12:00):    No Growth Final    Culture - Blood (collected 05-22-20 @ 06:19)  Source: .Blood None  Final Report (05-27-20 @ 12:00):    No Growth Final    Culture - Blood (collected 05-20-20 @ 16:20)  Source: .Blood Blood-Venous  Gram Stain (05-23-20 @ 08:44):    Growth in anaerobic bottle: Gram Negative Rods    Growth in aerobic bottle: Gram Negative Rods  Final Report (05-24-20 @ 14:39):    Growth in aerobic and anaerobic bottles: Proteus mirabilis ESBL    "Due to technical problems, Proteus sp. will Not be reported as part of    the BCID panel until further notice"    ***Blood Panel PCR results on this specimen are available    approximately3 hours after the Gram stain result.***    Gram stain, PCR, and/or culture results may not always    correspond due to difference in methodologies.    ************************************************************    This PCR assay was performed using [employer].    The following targets are tested for: Enterococcus,    vancomycin resistant enterococci, Listeria monocytogenes,    coagulase negative staphylococci, S. aureus,    methicillin resistant S. aureus, Streptococcus agalactiae    (Group B), S. pneumoniae, S. pyogenes (Group A),    Acinetobacter baumannii, Enterobacter cloacae, E. coli,    Klebsiella oxytoca, K. pneumoniae, Proteus sp.,    Serratia marcescens, Haemophilus influenzae,    Neisseria meningitidis, Pseudomonas aeruginosa, Candida    albicans, C. glabrata, C krusei, C parapsilosis,    C. tropicalis and the KPC resistance gene.  Organism: Blood Culture PCR  Proteus mirabilis ESBL (05-24-20 @ 14:39)  Organism: Proteus mirabilis ESBL (05-24-20 @ 14:39)      -  Amikacin: S <=16      -  Ampicillin: R >16 These ampicillin results predict results for amoxicillin      -  Ampicillin/Sulbactam: R 8/4 Enterobacter, Citrobacter, and Serratia may develop resistance during prolonged therapy (3-4 days)      -  Aztreonam: R 16      -  Cefazolin: R >16 Enterobacter, Citrobacter, and Serratia may develop resistance during prolonged therapy (3-4 days)      -  Cefepime: R >16      -  Cefoxitin: S <=8      -  Ceftriaxone: R >32 Enterobacter, Citrobacter, and Serratia may develop resistance during prolonged therapy      -  Ciprofloxacin: R >2      -  Ertapenem: S <=0.5      -  Gentamicin: S <=2      -  Levofloxacin: R >4      -  Meropenem: S <=1      -  Piperacillin/Tazobactam: R <=8      -  Tobramycin: S <=2      -  Trimethoprim/Sulfamethoxazole: R >2/38      Method Type: MIGUEL A  Organism: Blood Culture PCR (05-24-20 @ 14:39)      -  Acinetobacter baumanii: Nondet      -  Candida albicans: Nondet      -  Candida glabrata: Nondet      -  Candida krusei: Nondet      -  Candida parapsilosis: Nondet      -  Candida tropicalis: Nondet      -  Coagulase negative Staphylococcus: Nondet      -  Enterobacter cloacae complex: Nondet      -  Enterococcus species: Nondet      -  Escherichia coli: Nondet      -  Haemophilus influenzae: Nondet      -  Klebsiella oxytoca: Nondet      -  Klebsiella pneumoniae: Nondet      -  Listeria monocytogenes: Nondet      -  Methicillin resistant Staphylococcus aureus (MRSA): Nondet      -  Multidrug (KPC pos) resistant organism: Nondet      -  Neisseria meningitidis: Nondet      -  Pseudomonas aeruginosa: Nondet      -  Serratia marcescens: Nondet      -  Staphylococcus aureus: Nondet      -  Streptococcus agalactiae (Group B): Nondet      -  Streptococcus pneumoniae: Nondet      -  Streptococcus pyogenes (Group A): Nondet      -  Streptococcus sp. (Not Grp A, B or S pneumoniae): Nondet      -  Vancomycin resistant Enterococcus sp.: Nondet      Method Type: PCR    Culture - Urine (collected 05-20-20 @ 13:55)  Source: .Urine None  Final Report (05-23-20 @ 16:37):    Moderate Proteus mirabilis ESBL    Moderate Staphylococcus aureus  Organism: Proteus mirabilis ESBL  Staphylococcus aureus (05-23-20 @ 16:37)  Organism: Staphylococcus aureus (05-23-20 @ 16:37)      -  Ampicillin/Sulbactam: S <=8/4      -  Cefazolin: S <=4      -  Gentamicin: S <=1 Should not be used as monotherapy      -  Oxacillin: S 0.5      -  Penicillin: R >8      -  RIF- Rifampin: S <=1 Should not be used as monotherapy      -  Tetra/Doxy: S <=1      -  Trimethoprim/Sulfamethoxazole: S <=0.5/9.5      -  Vancomycin: S 2      Method Type: MIGUEL A  Organism: Proteus mirabilis ESBL (05-23-20 @ 16:37)      -  Amikacin: S <=16      -  Ampicillin: R >16 These ampicillin results predict results for amoxicillin      -  Ampicillin/Sulbactam: R <=8/4 Enterobacter, Citrobacter, and Serratia may develop resistance during prolonged therapy (3-4 days)      -  Aztreonam: R <=4      -  Cefazolin: R >16 (MIC_CL_COM_ENTERIC_CEFAZU) For uncomplicated UTI with K. pneumoniae, E. coli, or P. mirablis: MIGUEL A <=16 is sensitive and MIGUEL A >=32 is resistant. This also predicts results for oral agents cefaclor, cefdinir, cefpodoxime, cefprozil, cefuroxime axetil, cephalexin and locarbef for uncomplicated UTI. Note that some isolates may be susceptible to these agents while testing resistant to cefazolin.      -  Cefepime: R 8      -  Cefoxitin: S <=8      -  Ceftriaxone: R 32 Enterobacter, Citrobacter, and Serratia may develop resistance during prolonged therapy      -  Ciprofloxacin: R >2      -  Ertapenem: S <=1      -  Gentamicin: S <=4      -  Levofloxacin: R >4      -  Meropenem: S <=1      -  Nitrofurantoin: R >64 Should not be used to treat pyelonephritis      -  Piperacillin/Tazobactam: R <=16      -  Tobramycin: S <=4      -  Trimethoprim/Sulfamethoxazole: R >2/38      Method Type: MIGUEL A    Culture - Blood (collected 05-19-20 @ 11:18)  Source: .Blood None  Gram Stain (05-20-20 @ 19:57):    Growth in anaerobic bottle: Gram Positive Cocci in Clusters  Final Report (05-21-20 @ 18:19):    Growth in anaerobic bottle: Staphylococcus epidermidis    Coag Negative Staphylococcus    Single set isolate, possible contaminant. Contact    Microbiology if susceptibility testing clinically    indicated.    ***Blood Panel PCR results on this specimen are available    approximately 3 hours after the Gram stain result.***    Gram stain, PCR, and/or culture results may not always    correspond due to difference in methodologies.    ************************************************************    This PCR assay was performed using [employer].    The following targets are tested for: Enterococcus,    vancomycin resistant enterococci, Listeria monocytogenes,    coagulase negative staphylococci, S. aureus,    methicillin resistant S. aureus, Streptococcus agalactiae    (Group B), S. pneumoniae, S. pyogenes (Group A),    Acinetobacter baumannii, Enterobacter cloacae, E. coli,    Klebsiella oxytoca, K. pneumoniae, Proteus sp.,    Serratia marcescens, Haemophilus influenzae,    Neisseria meningitidis, Pseudomonas aeruginosa,Candida    albicans, C. glabrata, C krusei, C parapsilosis,    C. tropicalis and the KPC resistance gene.    "Due to technical problems, Proteus sp. will Not be reported as part of    the BCID panel until further notice"  Organism: Blood Culture PCR  Staphylococcus epidermidis (05-21-20 @ 18:19)  Organism: Staphylococcus epidermidis (05-21-20 @ 18:19)      Method Type: MIGUEL A  Organism: Blood Culture PCR (05-21-20 @ 18:19)      -  Coagulase negative Staphylococcus: Detec      Method Type: PCR    Culture - Urine (collected 05-19-20 @ 03:25)  Source: .Urine Clean Catch (Midstream)  Final Report (05-21-20 @ 17:31):    >100,000 CFU/ml Proteus mirabilis ESBL  Organism: Proteus mirabilis ESBL (05-21-20 @ 17:31)  Organism: Proteus mirabilis ESBL (05-21-20 @ 17:31)      -  Amikacin: S <=16      -  Ampicillin: R >16 These ampicillin results predict results for amoxicillin      -  Ampicillin/Sulbactam: R <=8/4 Enterobacter, Citrobacter, and Serratia may develop resistance during prolonged therapy (3-4 days)      -  Aztreonam: R >16      -  Cefazolin: R >16 (MIC_CL_COM_ENTERIC_CEFAZU) For uncomplicated UTI with K. pneumoniae, E. coli, or P. mirablis: MIGUEL A <=16 is sensitive and MIGUEL A >=32 is resistant. This also predicts results for oral agents cefaclor, cefdinir, cefpodoxime, cefprozil, cefuroxime axetil, cephalexin and locarbef for uncomplicated UTI. Note that some isolates may be susceptible to these agents while testing resistant to cefazolin.      -  Cefepime: R >16      -  Cefoxitin: S <=8      -  Ceftriaxone: R >32 Enterobacter, Citrobacter, and Serratia may develop resistance during prolonged therapy      -  Ciprofloxacin: R >2      -  Ertapenem: S <=1      -  Gentamicin: S <=4      -  Levofloxacin: R >4      -  Meropenem: S <=1      -  Nitrofurantoin: R >64 Should not be used to treat pyelonephritis      -  Piperacillin/Tazobactam: R <=16      -  Tobramycin: S <=4      -  Trimethoprim/Sulfamethoxazole: R >2/38      Method Type: MIGUEL A      Medications:  acetaminophen   Tablet .. 650 milliGRAM(s) Oral every 6 hours PRN  ALBUTerol    90 MICROgram(s) HFA Inhaler 2 Puff(s) Inhalation every 6 hours PRN  baclofen 10 milliGRAM(s) Oral three times a day  benzonatate 100 milliGRAM(s) Oral every 8 hours PRN  calcium carbonate   1250 mG (OsCal) 1 Tablet(s) Oral two times a day  calcium carbonate   1250 mG (OsCal) 1 Tablet(s) Oral daily  dicyclomine 10 milliGRAM(s) Oral daily  enoxaparin Injectable 40 milliGRAM(s) SubCutaneous daily  magnesium oxide 400 milliGRAM(s) Oral three times a day with meals  meropenem  IVPB      meropenem  IVPB 1000 milliGRAM(s) IV Intermittent every 8 hours  ondansetron Injectable 4 milliGRAM(s) IV Push every 6 hours PRN  PHENobarbital 64.8 milliGRAM(s) Oral daily  polyethylene glycol 3350 17 Gram(s) Oral every 12 hours  senna 2 Tablet(s) Oral at bedtime  tamsulosin 0.4 milliGRAM(s) Oral at bedtime        Assessment and Plan:  Patient is a 63 y/o male with PMH of cerebral palsy, seizure disorder, spastic paraplegia, s/p PEG tube, Asthma, H/O nephrolithiasis, BPH with bladder neck stricture s/p suprapubic catheter, and H/O Pseudomonas and ESBL Proteus mirabilis bacteruria who presented from a group with abdominal pain x1 days.       #Sepsis due to UTI (resolving) 2/2 pyelonephritis with hydronephrosis and ureter stones with proteus ESBL bacteruria and bacteremia   - s/p R PCT nephrostomy with stent on 5/20  - s/p Percutaneous nephrolithotomy (PCNL) for stone, stent removal  - c/w Meropenem (proteus ESBL bacteruria and bacteremia), Ertapenem on d/c until 6/8/20  -removed right urostomy tube     # h/o +COVID swab   -repeated COVID  negative     #lactic acidosis: resolved     #normocytic anemia: Hbg stable, likely dilutional, no signs of blood loss, monitor Hbg and keep active type and screening     #Abd distention: resolved   - PEG tube feeding  - bowel regimen     # Seizure disorder - Continue with phenobarbital 64.8mg PO through PEG tube QD (current level is ok as per neuro)    # Cerebral palsy w/ Spastic quadriplegia - Continue with baclofen 10mg PO three times a day, c/w PEG feeds       # Asthma - No wheezing on exam, on RA, Albuterol PRN     # BPH with bladder neck stricture s/p suprapubic catheter - Tube replaced day before admission, Chronic colonization w/ ESBL Proteus Mirabilis, grew Pseudomonas on last admission,    #DVT Prophylaxis: Lovenox 40mg SQ once daily,     #GI Prophylaxis: Not indicated     #Diet: through PEG tube feeding. NPO     Activity: bed rest     Dispo: [facility],  Leslie ([phone]) is the sister and health care proxy, Mitch is the nurse at [phone],   Code Status: Full Code    #Progress Note Handoff: Pending d/c on monday   Disposition: d/c to group [place] on monday

## 2020-05-31 LAB
ALBUMIN SERPL ELPH-MCNC: 3.3 G/DL — LOW (ref 3.5–5.2)
ALP SERPL-CCNC: 94 U/L — SIGNIFICANT CHANGE UP (ref 30–115)
ALT FLD-CCNC: 43 U/L — HIGH (ref 0–41)
ANION GAP SERPL CALC-SCNC: 13 MMOL/L — SIGNIFICANT CHANGE UP (ref 7–14)
AST SERPL-CCNC: 35 U/L — SIGNIFICANT CHANGE UP (ref 0–41)
BASOPHILS # BLD AUTO: 0.03 K/UL — SIGNIFICANT CHANGE UP (ref 0–0.2)
BASOPHILS NFR BLD AUTO: 0.7 % — SIGNIFICANT CHANGE UP (ref 0–1)
BILIRUB SERPL-MCNC: <0.2 MG/DL — SIGNIFICANT CHANGE UP (ref 0.2–1.2)
BUN SERPL-MCNC: 17 MG/DL — SIGNIFICANT CHANGE UP (ref 10–20)
CALCIUM SERPL-MCNC: 9.1 MG/DL — SIGNIFICANT CHANGE UP (ref 8.5–10.1)
CHLORIDE SERPL-SCNC: 101 MMOL/L — SIGNIFICANT CHANGE UP (ref 98–110)
CO2 SERPL-SCNC: 26 MMOL/L — SIGNIFICANT CHANGE UP (ref 17–32)
CREAT SERPL-MCNC: 0.5 MG/DL — LOW (ref 0.7–1.5)
EOSINOPHIL # BLD AUTO: 0.22 K/UL — SIGNIFICANT CHANGE UP (ref 0–0.7)
EOSINOPHIL NFR BLD AUTO: 5.2 % — SIGNIFICANT CHANGE UP (ref 0–8)
GLUCOSE SERPL-MCNC: 111 MG/DL — HIGH (ref 70–99)
HCT VFR BLD CALC: 37.1 % — LOW (ref 42–52)
HGB BLD-MCNC: 12.3 G/DL — LOW (ref 14–18)
IMM GRANULOCYTES NFR BLD AUTO: 1.2 % — HIGH (ref 0.1–0.3)
LYMPHOCYTES # BLD AUTO: 0.97 K/UL — LOW (ref 1.2–3.4)
LYMPHOCYTES # BLD AUTO: 23.1 % — SIGNIFICANT CHANGE UP (ref 20.5–51.1)
MAGNESIUM SERPL-MCNC: 2.6 MG/DL — HIGH (ref 1.8–2.4)
MCHC RBC-ENTMCNC: 31.7 PG — HIGH (ref 27–31)
MCHC RBC-ENTMCNC: 33.2 G/DL — SIGNIFICANT CHANGE UP (ref 32–37)
MCV RBC AUTO: 95.6 FL — HIGH (ref 80–94)
MONOCYTES # BLD AUTO: 0.47 K/UL — SIGNIFICANT CHANGE UP (ref 0.1–0.6)
MONOCYTES NFR BLD AUTO: 11.2 % — HIGH (ref 1.7–9.3)
NEUTROPHILS # BLD AUTO: 2.46 K/UL — SIGNIFICANT CHANGE UP (ref 1.4–6.5)
NEUTROPHILS NFR BLD AUTO: 58.6 % — SIGNIFICANT CHANGE UP (ref 42.2–75.2)
NRBC # BLD: 0 /100 WBCS — SIGNIFICANT CHANGE UP (ref 0–0)
PHOSPHATE SERPL-MCNC: 2.8 MG/DL — SIGNIFICANT CHANGE UP (ref 2.1–4.9)
PLATELET # BLD AUTO: 332 K/UL — SIGNIFICANT CHANGE UP (ref 130–400)
POTASSIUM SERPL-MCNC: 4.5 MMOL/L — SIGNIFICANT CHANGE UP (ref 3.5–5)
POTASSIUM SERPL-SCNC: 4.5 MMOL/L — SIGNIFICANT CHANGE UP (ref 3.5–5)
PROT SERPL-MCNC: 6.6 G/DL — SIGNIFICANT CHANGE UP (ref 6–8)
RBC # BLD: 3.88 M/UL — LOW (ref 4.7–6.1)
RBC # FLD: 14.5 % — SIGNIFICANT CHANGE UP (ref 11.5–14.5)
SARS-COV-2 RNA SPEC QL NAA+PROBE: DETECTED
SODIUM SERPL-SCNC: 140 MMOL/L — SIGNIFICANT CHANGE UP (ref 135–146)
WBC # BLD: 4.2 K/UL — LOW (ref 4.8–10.8)
WBC # FLD AUTO: 4.2 K/UL — LOW (ref 4.8–10.8)

## 2020-05-31 PROCEDURE — 99233 SBSQ HOSP IP/OBS HIGH 50: CPT

## 2020-05-31 RX ADMIN — ENOXAPARIN SODIUM 40 MILLIGRAM(S): 100 INJECTION SUBCUTANEOUS at 11:41

## 2020-05-31 RX ADMIN — MEROPENEM 100 MILLIGRAM(S): 1 INJECTION INTRAVENOUS at 12:42

## 2020-05-31 RX ADMIN — Medication 10 MILLIGRAM(S): at 04:12

## 2020-05-31 RX ADMIN — Medication 10 MILLIGRAM(S): at 12:42

## 2020-05-31 RX ADMIN — Medication 1 TABLET(S): at 11:41

## 2020-05-31 RX ADMIN — POLYETHYLENE GLYCOL 3350 17 GRAM(S): 17 POWDER, FOR SOLUTION ORAL at 04:12

## 2020-05-31 RX ADMIN — MAGNESIUM OXIDE 400 MG ORAL TABLET 400 MILLIGRAM(S): 241.3 TABLET ORAL at 04:12

## 2020-05-31 RX ADMIN — Medication 64.8 MILLIGRAM(S): at 11:41

## 2020-05-31 RX ADMIN — Medication 10 MILLIGRAM(S): at 11:41

## 2020-05-31 RX ADMIN — Medication 10 MILLIGRAM(S): at 21:21

## 2020-05-31 RX ADMIN — Medication 1 TABLET(S): at 17:03

## 2020-05-31 RX ADMIN — POLYETHYLENE GLYCOL 3350 17 GRAM(S): 17 POWDER, FOR SOLUTION ORAL at 17:03

## 2020-05-31 RX ADMIN — MAGNESIUM OXIDE 400 MG ORAL TABLET 400 MILLIGRAM(S): 241.3 TABLET ORAL at 11:41

## 2020-05-31 RX ADMIN — SENNA PLUS 2 TABLET(S): 8.6 TABLET ORAL at 21:21

## 2020-05-31 RX ADMIN — Medication 1 TABLET(S): at 04:13

## 2020-05-31 RX ADMIN — MEROPENEM 100 MILLIGRAM(S): 1 INJECTION INTRAVENOUS at 21:20

## 2020-05-31 RX ADMIN — MEROPENEM 100 MILLIGRAM(S): 1 INJECTION INTRAVENOUS at 04:13

## 2020-05-31 NOTE — PROGRESS NOTE ADULT - ASSESSMENT
Patient is a 63 y/o male with PMH of cerebral palsy, seizure disorder, spastic paraplegia, s/p PEG tube, Asthma, H/O nephrolithiasis, BPH with bladder neck stricture s/p suprapubic catheter, and H/O Pseudomonas and ESBL Proteus mirabilis bacteruria who presented from a group with abdominal pain x1 days.     #COVID 19 +ve PCR on 5/27  -pt was swapped for disposition does not have fever, cough or SOB, stable on RA  - transfer to COVID floor  - inflammatory makers unremarkable   - Afebrile on RA   - COVID neg on 5/29 then pos 5/30   - d/c to group home pending neg swab    #urosepsis (resolving) 2/2 pyelonephritis with hydronephrosis and ureter stones with proteus ESBL bacteruria and bacteremia   - s/p R PCT nephrostomy with stent on 5/20, and Percutaneous nephrolithotomy (PCNL) 5/26,   - no further intervention by urology  - Pain control with Toradol PRN, monitor renal function   - c/w Tamsulosin and Bentyl to facilitate passage   - c/w Meropenem (proteus ESBL bacteruria and bacteremia), repeat Bcx negative on 5/22,on discharge Ertapenem 1 gram QD to complete 14 days ends on 6/8/2020  - monitor UO through right nephrostomy drainage, once zero for the next 24 hours, can be removed  - suprapubic catherter change Q4 weeks, due to change in 2 weeks       #lactic acidosis: (resolved) possibly seizure after nephrostomy  with subtherapeutic phenobarb level  - phenobarb level is low, REEG  no seizure, just slowing, as per Neuro (spoke with Dr. Shipley), no dose adjustment needed,   - lactate level normalized  # acute normocytic anemia: Hbg stable, likely dilutional, no signs of blood loss, monitor Hbg and keep active type and screening   # chronic Cough is unlikely from  bronchitis   # Seizure disorder - Continue with phenobarbital 64.8mg PO through PEG tube QD  # Cerebral palsy w/ Spastic quadriplegia - Continue with baclofen 10mg PO three times a day, c/w PEG feeds   # Functional quadriplegia - full care  # Asthma - No wheezing on exam, on RA, Albuterol PRN   # BPH with bladder neck stricture s/p suprapubic catheter - Tube replaced day before admission, Chronic colonization w/ ESBL Proteus Mirabilis and Pseudomonas on last admission,    DVT Prophylaxis: Lovenox 40mg SQ once daily,   GI Prophylaxis: Not indicated   Diet: through PEG tube feeding  Activity: bed rest   Dispo: [facility], , Leslie ([phone]) is the sister and health care proxy, Mitch is the nurse at [facility] [phone],   Code Status: Full Code  Dispo: from [facility], medically stable d/c pending neg swab for COVID Patient is a 63 y/o male with PMH of cerebral palsy, seizure disorder, spastic paraplegia, s/p PEG tube, Asthma, H/O nephrolithiasis, BPH with bladder neck stricture s/p suprapubic catheter, and H/O Pseudomonas and ESBL Proteus mirabilis bacteruria who presented from a group with abdominal pain x1 days.     #COVID 19 +ve PCR on 5/27  -pt was swapped for disposition does not have fever, cough or SOB, stable on RA  - transfer to COVID floor  - inflammatory makers unremarkable   - Afebrile on RA   - COVID neg on 5/29 then pos 5/30 - NEEDS TO BE RESWABBED 6/2  - d/c to group home pending neg swab    #urosepsis (resolving) 2/2 pyelonephritis with hydronephrosis and ureter stones with proteus ESBL bacteruria and bacteremia   - s/p R PCT nephrostomy with stent on 5/20, and Percutaneous nephrolithotomy (PCNL) 5/26,   - no further intervention by urology  - Pain control with Toradol PRN, monitor renal function   - c/w Tamsulosin and Bentyl to facilitate passage   - c/w Meropenem (proteus ESBL bacteruria and bacteremia), repeat Bcx negative on 5/22,on discharge Ertapenem 1 gram QD to complete 14 days ends on 6/8/2020  - monitor UO through right nephrostomy drainage, once zero for the next 24 hours, can be removed  - suprapubic catherter change Q4 weeks, due to change in 2 weeks       #lactic acidosis: (resolved) possibly seizure after nephrostomy  with subtherapeutic phenobarb level  - phenobarb level is low, REEG  no seizure, just slowing, as per Neuro (spoke with Dr. Shipley), no dose adjustment needed,   - lactate level normalized  # acute normocytic anemia: Hbg stable, likely dilutional, no signs of blood loss, monitor Hbg and keep active type and screening   # chronic Cough is unlikely from  bronchitis   # Seizure disorder - Continue with phenobarbital 64.8mg PO through PEG tube QD  # Cerebral palsy w/ Spastic quadriplegia - Continue with baclofen 10mg PO three times a day, c/w PEG feeds   # Functional quadriplegia - full care  # Asthma - No wheezing on exam, on RA, Albuterol PRN   # BPH with bladder neck stricture s/p suprapubic catheter - Tube replaced day before admission, Chronic colonization w/ ESBL Proteus Mirabilis and Pseudomonas on last admission,    DVT Prophylaxis: Lovenox 40mg SQ once daily,   GI Prophylaxis: Not indicated   Diet: through PEG tube feeding  Activity: bed rest   Dispo: [facility], , Leslie ([phone]) is the sister and health care proxy, Mitch is the nurse at [facility] [phone],   Code Status: Full Code  Dispo: from [facility], medically stable d/c pending neg swab for COVID

## 2020-05-31 NOTE — PROGRESS NOTE ADULT - SUBJECTIVE AND OBJECTIVE BOX
Hospital Day:  12d    Subjective:    Pt was interviewed and examined at the bedside in the AM. No acute events overnight.   Denies headaches, changes in vision, chest pains, SOB, n/v/d, abd pain, constipation, changes in urination, pain on urination, weakness, fatigue, joint pain or muscle pain.       Past Medical Hx:   Asthma  Urinary retention  Urinary calculi  Spastic quadriplegia  Osteoporosis  Seizure  Cerebral palsy  BPH (benign prostatic hyperplasia)    Past Sx:  Suprapubic catheter  History of suprapubic catheter  H/O cystoscopy  S/P percutaneous endoscopic gastrostomy (PEG) tube placement    Allergies:  No Known Allergies    Current Meds:   Standng Meds:  baclofen 10 milliGRAM(s) Oral three times a day  calcium carbonate   1250 mG (OsCal) 1 Tablet(s) Oral two times a day  calcium carbonate   1250 mG (OsCal) 1 Tablet(s) Oral daily  dicyclomine 10 milliGRAM(s) Oral daily  enoxaparin Injectable 40 milliGRAM(s) SubCutaneous daily  magnesium oxide 400 milliGRAM(s) Oral three times a day with meals  meropenem  IVPB      meropenem  IVPB 1000 milliGRAM(s) IV Intermittent every 8 hours  PHENobarbital 64.8 milliGRAM(s) Oral daily  polyethylene glycol 3350 17 Gram(s) Oral every 12 hours  senna 2 Tablet(s) Oral at bedtime  tamsulosin 0.4 milliGRAM(s) Oral at bedtime    PRN Meds:  acetaminophen   Tablet .. 650 milliGRAM(s) Oral every 6 hours PRN Temp greater or equal to 38C (100.4F)  ALBUTerol    90 MICROgram(s) HFA Inhaler 2 Puff(s) Inhalation every 6 hours PRN Shortness of Breath and/or Wheezing  benzonatate 100 milliGRAM(s) Oral every 8 hours PRN Cough  ondansetron Injectable 4 milliGRAM(s) IV Push every 6 hours PRN Nausea    HOME MEDICATIONS:  Artificial Tears ophthalmic solution: 1 drop(s) to each affected eye 4 times a day  baclofen 10 mg oral tablet: 1 tab(s) by gastrostomy tube 3 times a day  benzonatate 100 mg oral capsule: 1 cap(s) orally every 8 hours, As needed, Cough  dicyclomine 10 mg oral capsule: 1 cap(s) orally once a day  docusate sodium 60 mg/15 mL oral syrup: 100 milligram(s) by gastrostomy tube once a day, As Needed for constipation  guaifenesin-dextromethorphan 100 mg-10 mg/5 mL oral liquid: 5 milliliter(s) orally every 6 hours, As needed, Cough  magnesium oxide 400 mg (241.3 mg elemental magnesium) oral tablet: 1 tab(s) orally 3 times a day (with meals)  Oysco 500 (1250 mg calcium carbonate) oral tablet: 1 tab(s) by gastrostomy tube 2 times a day  PHENobarbital 64.8 mg oral tablet: 1 tab(s) orally once a day via G tube  senna oral tablet: 2 tab(s) orally once a day (at bedtime)  tamsulosin 0.4 mg oral capsule: 1 cap(s) orally once a day (at bedtime)  Ventolin HFA 90 mcg/inh inhalation aerosol: 2 puff(s) inhaled 4 times a day, As Needed      Vital Signs:   T(F): 97.1 (05-31-20 @ 05:02), Max: 98.6 (05-30-20 @ 17:16)  HR: 97 (05-31-20 @ 05:02) (69 - 97)  BP: 134/75 (05-31-20 @ 05:02) (97/57 - 134/75)  RR: 16 (05-31-20 @ 05:02) (16 - 18)  SpO2: 94% (05-31-20 @ 07:25) (93% - 95%)      05-30-20 @ 07:01  -  05-31-20 @ 07:00  --------------------------------------------------------  IN: 1600 mL / OUT: 1550 mL / NET: 50 mL        Physical Exam:   General: WDWN  HEENT: NC/AT; PERRL, clear conjunctiva  Neck: supple  Cardiovascular: +S1/S2; RRR  Respiratory: CTA b/l; no W/R/R  Gastrointestinal: soft, NT/ND; +BSx4, suprapubic catheter  Extremities: WWP; 2+ peripheral pulses; no edema   Neurological: AAOx3; contracted extremities, no focal deficits        Labs:                         12.3   4.20  )-----------( 332      ( 31 May 2020 07:03 )             37.1     Neutophil% 58.6, Lymphocyte% 23.1, Monocyte% 11.2, Bands% 1.2 05-31-20 @ 07:03    30 May 2020 07:36    139    |  100    |  16     ----------------------------<  129    4.4     |  27     |  0.5      Ca    9.0        30 May 2020 07:36    TPro  6.6    /  Alb  3.3    /  TBili  0.2    /  DBili  x      /  AST  43     /  ALT  48     /  AlkPhos  86     30 May 2020 07:36                Iron --, TIBC --, %Sat -- Ferritin 345 05-28-20 @ 11:41            Culture - Blood (collected 05-25-20 @ 07:10)  Source: .Blood None  Final Report (05-30-20 @ 12:00):    No Growth Final

## 2020-05-31 NOTE — PROGRESS NOTE ADULT - SUBJECTIVE AND OBJECTIVE BOX
HAWATISH SCHUSTER  64y, Male    All available historical data reviewed    OVERNIGHT EVENTS:  no fever  feels well and has no complaints   98% RA    ROS:  General: Denies rigors, nightsweats  HEENT: Denies headache, rhinorrhea, sore throat, eye pain  CV: Denies CP, palpitations  PULM: Denies wheezing, hemoptysis  GI: Denies hematemesis, hematochezia, melena  : Denies discharge, hematuria  MSK: Denies arthralgias, myalgias  SKIN: Denies rash, lesions  NEURO: Denies paresthesias, weakness  PSYCH: Denies depression, anxiety    VITALS:  T(F): 97.1, Max: 98.6 (05-30-20 @ 17:16)  HR: 97  BP: 134/75  RR: 16Vital Signs Last 24 Hrs  T(C): 36.2 (31 May 2020 05:02), Max: 37 (30 May 2020 17:16)  T(F): 97.1 (31 May 2020 05:02), Max: 98.6 (30 May 2020 17:16)  HR: 97 (31 May 2020 05:02) (69 - 97)  BP: 134/75 (31 May 2020 05:02) (97/57 - 134/75)  BP(mean): --  RR: 16 (31 May 2020 05:02) (16 - 18)  SpO2: 94% (31 May 2020 07:25) (93% - 95%)    TESTS & MEASUREMENTS:                        12.3   4.20  )-----------( 332      ( 31 May 2020 07:03 )             37.1     05-31    140  |  101  |  17  ----------------------------<  111<H>  4.5   |  26  |  0.5<L>    Ca    9.1      31 May 2020 07:03  Phos  2.8     05-31  Mg     2.6     05-31    TPro  6.6  /  Alb  3.3<L>  /  TBili  <0.2  /  DBili  x   /  AST  35  /  ALT  43<H>  /  AlkPhos  94  05-31    LIVER FUNCTIONS - ( 31 May 2020 07:03 )  Alb: 3.3 g/dL / Pro: 6.6 g/dL / ALK PHOS: 94 U/L / ALT: 43 U/L / AST: 35 U/L / GGT: x             Culture - Blood (collected 05-25-20 @ 07:10)  Source: .Blood None  Final Report (05-30-20 @ 12:00):    No Growth Final            RADIOLOGY & ADDITIONAL TESTS:  Personal review of radiological diagnostics performed  Echo and EKG results noted when applicable.     MEDICATIONS:  acetaminophen   Tablet .. 650 milliGRAM(s) Oral every 6 hours PRN  ALBUTerol    90 MICROgram(s) HFA Inhaler 2 Puff(s) Inhalation every 6 hours PRN  baclofen 10 milliGRAM(s) Oral three times a day  benzonatate 100 milliGRAM(s) Oral every 8 hours PRN  calcium carbonate   1250 mG (OsCal) 1 Tablet(s) Oral two times a day  calcium carbonate   1250 mG (OsCal) 1 Tablet(s) Oral daily  dicyclomine 10 milliGRAM(s) Oral daily  enoxaparin Injectable 40 milliGRAM(s) SubCutaneous daily  magnesium oxide 400 milliGRAM(s) Oral three times a day with meals  meropenem  IVPB      meropenem  IVPB 1000 milliGRAM(s) IV Intermittent every 8 hours  ondansetron Injectable 4 milliGRAM(s) IV Push every 6 hours PRN  PHENobarbital 64.8 milliGRAM(s) Oral daily  polyethylene glycol 3350 17 Gram(s) Oral every 12 hours  senna 2 Tablet(s) Oral at bedtime  tamsulosin 0.4 milliGRAM(s) Oral at bedtime      ANTIBIOTICS:  meropenem  IVPB      meropenem  IVPB 1000 milliGRAM(s) IV Intermittent every 8 hours

## 2020-05-31 NOTE — PROGRESS NOTE ADULT - ASSESSMENT
· Assessment		  63 y/o male with PMH of cerebral palsy, seizure disorder, spastic paraplegia, s/p PEG tube, Asthma, H/O nephrolithiasis, BPH with bladder neck stricture s/p suprapubic catheter, and H/O Pseudomonas and ESBL Proteus mirabilis bacteruria who presented from a group with abdominal pain x1 days found to have R sided nephrolithiasis with hydronephrosis. Pt also has recent URI symptoms r/o COVID.     IMPRESSION;   Resolved sepsis secondary to acute Right pyelonephritis with right hydronephrosis with right ureteral stone  S/p R PCN by IVR 5/20  IVR Procedure: 5/26   Over-the-wire right nephrostogram and removal of right nephroureteral catheter  BCx 5/19 CoNS ( not a true pathogen)  BCx 5/20  P mirabilis ESBl  BCx 5/22 NG  UCx 5/19 P mirabilis ESBL  CT A/P - right hydronephrosis with a 9 x 7 x 10 mm proximal right ureteral calculus and ureteral enhancement   PROCEDURES:  Nephrostogram 26-May-2020 15:34:10 right Bonnie Cooper  Replacement of external ureteral stent 26-May-2020 15:34:02 right Bonnie Cooper  Antegrade ureteroscopy 26-May-2020 15:33:44 right Bonnie Cooper  Percutaneous removal of calculus of kidney, 2 cm or more in diameter 26-May-  COVID19 POSITIVE 5/27 after being repeatedly NG  COVID19 NG 5/29  COVID-19 positive 5/30  ferritin 345  Do not see any evidence of active disease  CT abdomen 5/27 : left pleural effusion. No ground glass opacities  Clinically, radiographically, lack of hypoxemia all go towards a false positive reaction with no active disease    RECOMMENDATIONS;  home on ertapenem 1 gm iv q24h for 14 days starting 5/26. Till then continue with meropenem  no treatment for COVID-19

## 2020-06-01 LAB
ALBUMIN SERPL ELPH-MCNC: 3.5 G/DL — SIGNIFICANT CHANGE UP (ref 3.5–5.2)
ALP SERPL-CCNC: 94 U/L — SIGNIFICANT CHANGE UP (ref 30–115)
ALT FLD-CCNC: 41 U/L — SIGNIFICANT CHANGE UP (ref 0–41)
ANION GAP SERPL CALC-SCNC: 13 MMOL/L — SIGNIFICANT CHANGE UP (ref 7–14)
AST SERPL-CCNC: 37 U/L — SIGNIFICANT CHANGE UP (ref 0–41)
BASOPHILS # BLD AUTO: 0.01 K/UL — SIGNIFICANT CHANGE UP (ref 0–0.2)
BASOPHILS NFR BLD AUTO: 0.2 % — SIGNIFICANT CHANGE UP (ref 0–1)
BILIRUB SERPL-MCNC: 0.3 MG/DL — SIGNIFICANT CHANGE UP (ref 0.2–1.2)
BUN SERPL-MCNC: 17 MG/DL — SIGNIFICANT CHANGE UP (ref 10–20)
CALCIUM SERPL-MCNC: 8.9 MG/DL — SIGNIFICANT CHANGE UP (ref 8.5–10.1)
CHLORIDE SERPL-SCNC: 100 MMOL/L — SIGNIFICANT CHANGE UP (ref 98–110)
CO2 SERPL-SCNC: 28 MMOL/L — SIGNIFICANT CHANGE UP (ref 17–32)
CREAT SERPL-MCNC: 0.5 MG/DL — LOW (ref 0.7–1.5)
EOSINOPHIL # BLD AUTO: 0.21 K/UL — SIGNIFICANT CHANGE UP (ref 0–0.7)
EOSINOPHIL NFR BLD AUTO: 4.2 % — SIGNIFICANT CHANGE UP (ref 0–8)
GLUCOSE SERPL-MCNC: 141 MG/DL — HIGH (ref 70–99)
HCT VFR BLD CALC: 37.5 % — LOW (ref 42–52)
HGB BLD-MCNC: 12.4 G/DL — LOW (ref 14–18)
IMM GRANULOCYTES NFR BLD AUTO: 0.6 % — HIGH (ref 0.1–0.3)
LYMPHOCYTES # BLD AUTO: 0.99 K/UL — LOW (ref 1.2–3.4)
LYMPHOCYTES # BLD AUTO: 20 % — LOW (ref 20.5–51.1)
MCHC RBC-ENTMCNC: 31.4 PG — HIGH (ref 27–31)
MCHC RBC-ENTMCNC: 33.1 G/DL — SIGNIFICANT CHANGE UP (ref 32–37)
MCV RBC AUTO: 94.9 FL — HIGH (ref 80–94)
MONOCYTES # BLD AUTO: 0.52 K/UL — SIGNIFICANT CHANGE UP (ref 0.1–0.6)
MONOCYTES NFR BLD AUTO: 10.5 % — HIGH (ref 1.7–9.3)
NEUTROPHILS # BLD AUTO: 3.19 K/UL — SIGNIFICANT CHANGE UP (ref 1.4–6.5)
NEUTROPHILS NFR BLD AUTO: 64.5 % — SIGNIFICANT CHANGE UP (ref 42.2–75.2)
NRBC # BLD: 0 /100 WBCS — SIGNIFICANT CHANGE UP (ref 0–0)
PLATELET # BLD AUTO: 344 K/UL — SIGNIFICANT CHANGE UP (ref 130–400)
POTASSIUM SERPL-MCNC: 4.6 MMOL/L — SIGNIFICANT CHANGE UP (ref 3.5–5)
POTASSIUM SERPL-SCNC: 4.6 MMOL/L — SIGNIFICANT CHANGE UP (ref 3.5–5)
PROT SERPL-MCNC: 7.1 G/DL — SIGNIFICANT CHANGE UP (ref 6–8)
RBC # BLD: 3.95 M/UL — LOW (ref 4.7–6.1)
RBC # FLD: 14.5 % — SIGNIFICANT CHANGE UP (ref 11.5–14.5)
SODIUM SERPL-SCNC: 141 MMOL/L — SIGNIFICANT CHANGE UP (ref 135–146)
WBC # BLD: 4.95 K/UL — SIGNIFICANT CHANGE UP (ref 4.8–10.8)
WBC # FLD AUTO: 4.95 K/UL — SIGNIFICANT CHANGE UP (ref 4.8–10.8)

## 2020-06-01 PROCEDURE — 99232 SBSQ HOSP IP/OBS MODERATE 35: CPT

## 2020-06-01 RX ADMIN — Medication 1 TABLET(S): at 05:52

## 2020-06-01 RX ADMIN — POLYETHYLENE GLYCOL 3350 17 GRAM(S): 17 POWDER, FOR SOLUTION ORAL at 05:52

## 2020-06-01 RX ADMIN — MEROPENEM 100 MILLIGRAM(S): 1 INJECTION INTRAVENOUS at 05:52

## 2020-06-01 RX ADMIN — Medication 64.8 MILLIGRAM(S): at 15:37

## 2020-06-01 RX ADMIN — Medication 1 TABLET(S): at 13:05

## 2020-06-01 RX ADMIN — SENNA PLUS 2 TABLET(S): 8.6 TABLET ORAL at 22:43

## 2020-06-01 RX ADMIN — Medication 10 MILLIGRAM(S): at 12:57

## 2020-06-01 RX ADMIN — MEROPENEM 100 MILLIGRAM(S): 1 INJECTION INTRAVENOUS at 15:17

## 2020-06-01 RX ADMIN — Medication 10 MILLIGRAM(S): at 22:43

## 2020-06-01 RX ADMIN — ENOXAPARIN SODIUM 40 MILLIGRAM(S): 100 INJECTION SUBCUTANEOUS at 12:58

## 2020-06-01 RX ADMIN — Medication 1 TABLET(S): at 17:27

## 2020-06-01 RX ADMIN — MEROPENEM 100 MILLIGRAM(S): 1 INJECTION INTRAVENOUS at 22:54

## 2020-06-01 RX ADMIN — Medication 10 MILLIGRAM(S): at 15:09

## 2020-06-01 RX ADMIN — Medication 10 MILLIGRAM(S): at 05:52

## 2020-06-01 NOTE — PROGRESS NOTE ADULT - SUBJECTIVE AND OBJECTIVE BOX
LENGTH OF HOSPITAL STAY: 13d      CHIEF COMPLAINT:   Patient is a 64y old  Male who presents with a chief complaint of covid (30 May 2020 14:28)      OVER Past 24hrs:  The patient was seen and examined at bedside there were no acute events  during the night.         PAST MEDICAL & SURGICAL HISTORY  PAST MEDICAL & SURGICAL HISTORY:  Asthma  Urinary retention  Urinary calculi  Spastic quadriplegia  Osteoporosis  Seizure: last seizure &gt;10 years ago  Cerebral palsy  BPH (benign prostatic hyperplasia)  Suprapubic catheter  H/O cystoscopy  S/P percutaneous endoscopic gastrostomy (PEG) tube placement        REVIEW OF SYSTEMS  Patient  denies pain and abdominal discomfort     ALLERGIES:  No Known Allergies    MEDICATIONS:  STANDING MEDICATIONS  baclofen 10 milliGRAM(s) Oral three times a day  calcium carbonate   1250 mG (OsCal) 1 Tablet(s) Oral two times a day  calcium carbonate   1250 mG (OsCal) 1 Tablet(s) Oral daily  dicyclomine 10 milliGRAM(s) Oral daily  enoxaparin Injectable 40 milliGRAM(s) SubCutaneous daily  meropenem  IVPB      meropenem  IVPB 1000 milliGRAM(s) IV Intermittent every 8 hours  PHENobarbital 64.8 milliGRAM(s) Oral daily  polyethylene glycol 3350 17 Gram(s) Oral every 12 hours  senna 2 Tablet(s) Oral at bedtime  tamsulosin 0.4 milliGRAM(s) Oral at bedtime    PRN MEDICATIONS  acetaminophen   Tablet .. 650 milliGRAM(s) Oral every 6 hours PRN  ALBUTerol    90 MICROgram(s) HFA Inhaler 2 Puff(s) Inhalation every 6 hours PRN  benzonatate 100 milliGRAM(s) Oral every 8 hours PRN  ondansetron Injectable 4 milliGRAM(s) IV Push every 6 hours PRN    VITALS:   T(F): 98.7  HR: 76  BP: 92/58  RR: 18  SpO2: 96%    PHYSICAL EXAM:  General: No acute distress.   interactive,     HEENT: Pupils equal, reactive to light symmetrically.    PULM: Clear to auscultation bilaterally decreased breath sounds in lower lobes, no significant sputum production.    CVS: Regular rate and rhythm, no murmurs, rubs, or gallops.    GI: Soft, nondistended, nontender, no masses. PEG noted no drainage     URINARY: Suprapubic Cath  noted     MSK: No edema, nontender. Quadriplegic     SKIN: Warm and well perfused, no rashes noted.    PSYCH: at baseline         LABS:                        12.4   4.95  )-----------( 344      ( 01 Jun 2020 07:22 )             37.5     06-01    141  |  100  |  17  ----------------------------<  141<H>  4.6   |  28  |  0.5<L>    Ca    8.9      01 Jun 2020 07:22  Phos  2.8     05-31  Mg     2.6     05-31    TPro  7.1  /  Alb  3.5  /  TBili  0.3  /  DBili  x   /  AST  37  /  ALT  41  /  AlkPhos  94  06-01                  RADIOLOGY:      < from: 12 Lead ECG (05.20.20 @ 16:06) >    Ventricular Rate 132 BPM    Atrial Rate 132 BPM    P-R Interval 126 ms    QRS Duration 62 ms    Q-T Interval 290 ms    QTC Calculation(Bezet) 429 ms    P Axis 69 degrees    R Axis 30 degrees    T Axis -43 degrees    Diagnosis Line Sinus tachycardiawith Fusion complexes  Nonspecific ST and T wave abnormality  Abnormal ECG    < end of copied text >    < from: Xray Chest 1 View- PORTABLE-Routine (05.29.20 @ 10:30) >      Impression:    Stable bilateral opacities. Stable elevation left hemidiaphragm.     No pneumothorax    < end of copied text >      < from: CT Abdomen and Pelvis No Cont (05.27.20 @ 09:10) >  IMPRESSION:     1.  Since 5/19/2020, interval placement of a right-sided percutaneous nephroureterostomy tube with proximal end coiled within the right renal collecting system and distal end within the urinary bladder; previously noted right proximal ureteral calculus no longer visualized.    2.  Previously noted nonobstructing right renal calculi have decreased in size with 2 fragments remaining, measuring 3 x 2 x 2 mm and 5 x 4 x 3 mm within the upper and lower poles, respectively (approximately 250-300 Hounsfield units.)    3.  New trace left pleural effusion.    < end of copied text >

## 2020-06-01 NOTE — PROGRESS NOTE ADULT - ASSESSMENT
Patient is a 65 y/o male with PMH of cerebral palsy, seizure disorder, spastic paraplegia, s/p PEG tube, Asthma, H/O nephrolithiasis, BPH with bladder neck stricture s/p suprapubic catheter, and H/O Pseudomonas and ESBL Proteus mirabilis bacteruria who presented from a group with abdominal pain x1 days.       #Sepsis due to UTI (resolving) 2/2 pyelonephritis with hydronephrosis and ureter stones with proteus ESBL bacteruria and bacteremia   - s/p R PCT nephrostomy with stent on 5/20  - s/p Percutaneous nephrolithotomy (PCNL) for stone, stent removal  - c/w Meropenem (proteus ESBL bacteruria and bacteremia), Ertapenem on d/c until 6/8/20      #+COVID swab (done only so he can return to group home)   -pt a-Sx  -repeat COVID on 6/2    #lactic acidosis: (reolved) possibly seizure after nephrostomy  with subtherapeutic phenobarb level  - phenobarb level is low, REEG  no seizure, just slowing, as per Neuro (spoke with Dr. Shipley), no dose adjustment needed,   - lactate level normalized    #normocytic anemia: Hbg stable, likely dilutional, no signs of blood loss, monitor Hbg and keep active type and screening     #Abd distention: resolved   - PEG tube feeding  - bowel regimen     # Seizure disorder - Continue with phenobarbital 64.8mg PO through PEG tube QD (current level is ok as per neuro)    # Cerebral palsy w/ Spastic quadriplegia - Continue with baclofen 10mg PO three times a day, c/w PEG feeds     # Functional quadriplegia - full care    # Asthma - No wheezing on exam, on RA, Albuterol PRN     # BPH with bladder neck stricture s/p suprapubic catheter - Tube replaced day before admission, Chronic colonization w/ ESBL Proteus Mirabilis, grew Pseudomonas on last admission,    #DVT Prophylaxis: Lovenox 40mg SQ once daily,     #GI Prophylaxis: Not indicated     #Diet: through PEG tube feeding. NPO     Activity: bed rest     Dispo: senior care, , Leslie ([phone]) is the sister and health care proxy, Meyer is the nurse at shelter [phone],   Code Status: Full Code    #Progress Note Handoff  Pending (specify): Once Group Home can take pt back and Negative Covid test  Pt/Family discussion: Pt/sister informed and agree with the current plan  Disposition: Home__x____SNF_______To be determined________ .

## 2020-06-01 NOTE — CHART NOTE - NSCHARTNOTEFT_GEN_A_CORE
Registered Dietitian Follow-Up     Patient Profile Reviewed                           Yes [x]   No []     Nutrition History Previously Obtained        Yes []  No [x] unable to reach the nurse at the group home      Pertinent Information: Sepsis due to UTI -resolving per MD, c/w abx. Discharge planning. Still positive COVID swab, plan to repeat tomorrow (6/2).  Abd distention-resolved. Cerebral palsy w/spastic quadriplegia. Seizure disorder. Meds/labs noted. Skin is intact. GI- 2 BMs documented yesterday (pt is on bowel regimen in addition to receiving fiber containing formula). Weights - 56.9kg today, 56.9kg (5/27), 58.6kg & 59.1kg (both on 5/21) - will continue to monitor. Skin is intact.       Current diet order: Jevity1.2 - 300ml q6hr ( 1440kcal, 67 gm protein, 972ml h20)    Nutrition Requirements  Weight Used:  59.1kg (as per previous RD note, numbers are comparable if use lowest weight of 56.9kg)    Estimated Energy Needs    Continue [x]  Adjust [] 3161-5988 kcal/day (MSJ x 1.2-1.3)  - this is compared with using cerebral palsy ht (cm) x 11kcal  Estimated Protein Needs    Continue [x]  Adjust []  59-71 g/day (1.0-1.2 g/kg of act wt) - this is compared with using cerebral palsy 15% protein from kcal  Estimated Fluid Needs        Continue [x]  Adjust [] 1ml/kcal    No new nutrition dx.     Current EN order meets estimated needs. Additional free h20 flush as per LIP.     Pt remains not at risk, will reassess in 7 days.

## 2020-06-01 NOTE — PROGRESS NOTE ADULT - ASSESSMENT
Patient is a 65 y/o male with PMH of cerebral palsy, seizure disorder, spastic paraplegia, s/p PEG tube, Asthma, H/O nephrolithiasis, BPH with bladder neck stricture s/p suprapubic catheter, and H/O Pseudomonas and ESBL Proteus mirabilis bacteruria who presented from a group with abdominal pain x1 days.         IMPRESSION   COVID 19 +ve PCR on 5/27 resolved  but remains covid  positive   Urosepsis (resolving) 2/2 pyelonephritis with hydronephrosis and ureter stones with proteus ESBL bacteruria and bacteremia   BPH with bladder neck stricture s/p suprapubic catheter  Acute normocytic anemia- stable   Lactic acidosis: (resolved)   Functional quadriplegia   Cerebral palsy        PLAN   #COVID 19 +ve PCR on 5/27 resolved  but remains covid  positive   - COVID neg on 5/29 then pos 5/30 - will be RESWABBED 6/2  - d/c to group home pending neg swab    #urosepsis (resolving) 2/2 pyelonephritis with hydronephrosis and ureter stones with proteus ESBL bacteruria and bacteremia   - s/p R PCT nephrostomy with stent on 5/20, and Percutaneous nephrolithotomy ([facility]) 5/26,   - no further intervention by urology  - Pain control with Toradol PRN, monitor renal function   - c/w Tamsulosin and Bentyl to facilitate passage   - c/w Meropenem (proteus ESBL bacteruria and bacteremia), repeat Bcx negative on 5/22,on discharge Ertapenem 1 gram QD to complete 14 days ends on 6/8/2020  - monitor UO through right nephrostomy drainage, once zero for the next 24 hours, can be removed  - suprapubic catherter change Q4 weeks, due to change in 2 weeks       #lactic acidosis: (resolved) possibly secondary to nephrostomy now resolved   # Acute normocytic anemia: Hbg stable, likely dilutional, no signs of blood loss, monitor Hbg and keep active type and screening   # Chronic Cough is unlikely from  bronchitis   # Seizure disorder - Continue with phenobarbital 64.8mg PO through PEG tube QD no REEG negative   # Cerebral palsy w/ Spastic quadriplegia - Continue with baclofen 10mg PO three times a day, c/w PEG feeds   # Functional quadriplegia - full care  # Asthma - No wheezing on exam, on RA, Albuterol PRN   # BPH with bladder neck stricture s/p suprapubic catheter - Tube replaced day before admission, Chronic colonization w/ ESBL Proteus Mirabilis and Pseudomonas on last admission,    DVT Prophylaxis: Lovenox 40mg SQ once daily,   GI Prophylaxis: Not indicated   Diet: through PEG tube feeding  Activity: bed rest   Dispo: [facility], , Leslie ([phone]) is the sister and health care proxy, Mitch is the nurse at [phone],   Code Status: Full Code  Dispo: from [facility], medically stable d/c pending neg swab for COVID

## 2020-06-01 NOTE — PROGRESS NOTE ADULT - SUBJECTIVE AND OBJECTIVE BOX
Patient is a 64y old  Male who presents with a chief complaint of sepsis (24 May 2020 08:45)    INTERVAL HPI/OVERNIGHT EVENTS: no complaints, feels well  ROS: Denies CP, SOB, AP, new weakness  All other systems reviewed and are within normal limits.  InitialHPI:  Patient is a 63 y/o male with PMH of cerebral palsy, seizure disorder, spastic paraplegia, s/p PEG tube, Asthma, H/O nephrolithiasis, BPH with bladder neck stricture s/p suprapubic catheter, and H/O Pseudomonas and ESBL Proteus mirabilis bacteruria who presented from a group with abdominal pain x1 days. Pt states that he started getting a R sided flank pain with radiation to his pelvis after getting his suprapubic cathter tube changes. Pt states that the pain is sharp and constant. Pt also endorses some nausea and vomited x1 in the AM. He also has some pain at the catheter insertion site however catheter has been draining well. Off note pt has a progressive worsening congestion for the last few days. As per aid, pts group home had a resident test postive for COVID. Pt was likely in close contact with resident. Pt also endorses recent subjective fevers. He has had a cough for the last month, pt was recently admitted to the ICU for non-COVID coronavirus bronchitis, never intubated. Denies any other complaints. Denies any chills, changes in weight, chest pain, SOB, diarrhea, myalgias, arthralgias syncope, fatigue.   In the ED, /67  RR 18 SpO2 94 on RA afebrile. CT A/P showed a [id number] proximal right ureteral stone with mod hydronephrosis. Urolgoy was consulted. Pt was given IVF, pain control and Merox1. Swabbed for COVID. (19 May 2020 08:44)    KIDNEY STONE;HYDRONEPHROSIS;URINARY TRACT INFECTION  ^KIDNEY STONE;HYDRONEPHROSIS;URINARY TRACT INFECTION  Family history unknown  No pertinent family history in first degree relatives  Handoff  MEWS Score  Asthma  Urinary retention  Urinary calculi  Spastic quadriplegia  Osteoporosis  Seizure  Cerebral palsy  BPH (benign prostatic hyperplasia)  Kidney stone  Suprapubic catheter  History of suprapubic catheter  H/O cystoscopy  S/P percutaneous endoscopic gastrostomy (PEG) tube placement  CATHETER PROBLEM  52  Urinary tract infection  Hydronephrosis    PAST MEDICAL & SURGICAL HISTORY:  Asthma  Urinary retention  Urinary calculi  Spastic quadriplegia  Osteoporosis  Seizure: last seizure &gt;10 years ago  Cerebral palsy  BPH (benign prostatic hyperplasia)  Suprapubic catheter  H/O cystoscopy  S/P percutaneous endoscopic gastrostomy (PEG) tube placement      General: NAD, AAO3, mild dysarthria  CV: S1 S2  Resp: decreased breath sounds at bases  GI: NT/ND/S +BS, +PEG, +suprapubic cath   MS: contracted b/l UE and LE, spastic quadriplegia            MEDICATIONS  (STANDING):  baclofen 10 milliGRAM(s) Oral three times a day  calcium carbonate   1250 mG (OsCal) 1 Tablet(s) Oral two times a day  calcium carbonate   1250 mG (OsCal) 1 Tablet(s) Oral daily  dicyclomine 10 milliGRAM(s) Oral daily  enoxaparin Injectable 40 milliGRAM(s) SubCutaneous daily  meropenem  IVPB      meropenem  IVPB 1000 milliGRAM(s) IV Intermittent every 8 hours  PHENobarbital 64.8 milliGRAM(s) Oral daily  polyethylene glycol 3350 17 Gram(s) Oral every 12 hours  senna 2 Tablet(s) Oral at bedtime  tamsulosin 0.4 milliGRAM(s) Oral at bedtime    MEDICATIONS  (PRN):  acetaminophen   Tablet .. 650 milliGRAM(s) Oral every 6 hours PRN Temp greater or equal to 38C (100.4F)  ALBUTerol    90 MICROgram(s) HFA Inhaler 2 Puff(s) Inhalation every 6 hours PRN Shortness of Breath and/or Wheezing  benzonatate 100 milliGRAM(s) Oral every 8 hours PRN Cough  ondansetron Injectable 4 milliGRAM(s) IV Push every 6 hours PRN Nausea    Home Medications:  Artificial Tears ophthalmic solution: 1 drop(s) to each affected eye 4 times a day (20 Mar 2020 21:56)  baclofen 10 mg oral tablet: 1 tab(s) by gastrostomy tube 3 times a day (20 Mar 2020 21:56)  benzonatate 100 mg oral capsule: 1 cap(s) orally every 8 hours, As needed, Cough (28 May 2020 09:22)  dicyclomine 10 mg oral capsule: 1 cap(s) orally once a day (28 May 2020 09:22)  docusate sodium 60 mg/15 mL oral syrup: 100 milligram(s) by gastrostomy tube once a day, As Needed for constipation (20 Mar 2020 21:56)  guaifenesin-dextromethorphan 100 mg-10 mg/5 mL oral liquid: 5 milliliter(s) orally every 6 hours, As needed, Cough (27 Mar 2020 11:14)  magnesium oxide 400 mg (241.3 mg elemental magnesium) oral tablet: 1 tab(s) orally 3 times a day (with meals) (28 May 2020 09:22)  Oysco 500 (1250 mg calcium carbonate) oral tablet: 1 tab(s) by gastrostomy tube 2 times a day (20 Mar 2020 21:56)  PHENobarbital 64.8 mg oral tablet: 1 tab(s) orally once a day via G tube (20 Mar 2020 21:56)  senna oral tablet: 2 tab(s) orally once a day (at bedtime) (28 May 2020 09:22)  tamsulosin 0.4 mg oral capsule: 1 cap(s) orally once a day (at bedtime) (28 May 2020 09:22)  Ventolin HFA 90 mcg/inh inhalation aerosol: 2 puff(s) inhaled 4 times a day, As Needed (20 Mar 2020 21:56)    Vital Signs Last 24 Hrs  T(C): 36.6 (01 Jun 2020 16:41), Max: 37.1 (01 Jun 2020 09:21)  T(F): 97.9 (01 Jun 2020 16:41), Max: 98.7 (01 Jun 2020 09:21)  HR: 77 (01 Jun 2020 16:41) (70 - 101)  BP: 108/61 (01 Jun 2020 16:41) (92/58 - 112/62)  BP(mean): --  RR: 18 (01 Jun 2020 16:41) (18 - 18)  SpO2: 99% (01 Jun 2020 16:41) (93% - 99%)  CAPILLARY BLOOD GLUCOSE        LABS:                        12.4   4.95  )-----------( 344      ( 01 Jun 2020 07:22 )             37.5     06-01    141  |  100  |  17  ----------------------------<  141<H>  4.6   |  28  |  0.5<L>    Ca    8.9      01 Jun 2020 07:22  Phos  2.8     05-31  Mg     2.6     05-31    TPro  7.1  /  Alb  3.5  /  TBili  0.3  /  DBili  x   /  AST  37  /  ALT  41  /  AlkPhos  94  06-01    LIVER FUNCTIONS - ( 01 Jun 2020 07:22 )  Alb: 3.5 g/dL / Pro: 7.1 g/dL / ALK PHOS: 94 U/L / ALT: 41 U/L / AST: 37 U/L / GGT: x                           Consultant Notes Reviewed:  [x ] YES  [ ] NO  Care Discussed with Consultants/Other Providers/ Housestaff [ x] YES  [ ] NO  Radiology, labs, new studies personally reviewed.

## 2020-06-02 LAB
ALBUMIN SERPL ELPH-MCNC: 3.4 G/DL — LOW (ref 3.5–5.2)
ALP SERPL-CCNC: 96 U/L — SIGNIFICANT CHANGE UP (ref 30–115)
ALT FLD-CCNC: 39 U/L — SIGNIFICANT CHANGE UP (ref 0–41)
ANION GAP SERPL CALC-SCNC: 12 MMOL/L — SIGNIFICANT CHANGE UP (ref 7–14)
AST SERPL-CCNC: 32 U/L — SIGNIFICANT CHANGE UP (ref 0–41)
BASOPHILS # BLD AUTO: 0.03 K/UL — SIGNIFICANT CHANGE UP (ref 0–0.2)
BASOPHILS NFR BLD AUTO: 0.7 % — SIGNIFICANT CHANGE UP (ref 0–1)
BILIRUB SERPL-MCNC: 0.4 MG/DL — SIGNIFICANT CHANGE UP (ref 0.2–1.2)
BUN SERPL-MCNC: 20 MG/DL — SIGNIFICANT CHANGE UP (ref 10–20)
CALCIUM SERPL-MCNC: 9 MG/DL — SIGNIFICANT CHANGE UP (ref 8.5–10.1)
CHLORIDE SERPL-SCNC: 105 MMOL/L — SIGNIFICANT CHANGE UP (ref 98–110)
CO2 SERPL-SCNC: 27 MMOL/L — SIGNIFICANT CHANGE UP (ref 17–32)
CREAT SERPL-MCNC: 0.6 MG/DL — LOW (ref 0.7–1.5)
EOSINOPHIL # BLD AUTO: 0.25 K/UL — SIGNIFICANT CHANGE UP (ref 0–0.7)
EOSINOPHIL NFR BLD AUTO: 5.8 % — SIGNIFICANT CHANGE UP (ref 0–8)
GLUCOSE BLDC GLUCOMTR-MCNC: 141 MG/DL — HIGH (ref 70–99)
GLUCOSE SERPL-MCNC: 112 MG/DL — HIGH (ref 70–99)
HCT VFR BLD CALC: 37.3 % — LOW (ref 42–52)
HGB BLD-MCNC: 12.3 G/DL — LOW (ref 14–18)
IMM GRANULOCYTES NFR BLD AUTO: 0.7 % — HIGH (ref 0.1–0.3)
LYMPHOCYTES # BLD AUTO: 1.14 K/UL — LOW (ref 1.2–3.4)
LYMPHOCYTES # BLD AUTO: 26.3 % — SIGNIFICANT CHANGE UP (ref 20.5–51.1)
MAGNESIUM SERPL-MCNC: 2.4 MG/DL — SIGNIFICANT CHANGE UP (ref 1.8–2.4)
MCHC RBC-ENTMCNC: 32.3 PG — HIGH (ref 27–31)
MCHC RBC-ENTMCNC: 33 G/DL — SIGNIFICANT CHANGE UP (ref 32–37)
MCV RBC AUTO: 97.9 FL — HIGH (ref 80–94)
MONOCYTES # BLD AUTO: 0.56 K/UL — SIGNIFICANT CHANGE UP (ref 0.1–0.6)
MONOCYTES NFR BLD AUTO: 12.9 % — HIGH (ref 1.7–9.3)
NEUTROPHILS # BLD AUTO: 2.32 K/UL — SIGNIFICANT CHANGE UP (ref 1.4–6.5)
NEUTROPHILS NFR BLD AUTO: 53.6 % — SIGNIFICANT CHANGE UP (ref 42.2–75.2)
NRBC # BLD: 0 /100 WBCS — SIGNIFICANT CHANGE UP (ref 0–0)
PHOSPHATE SERPL-MCNC: 3.2 MG/DL — SIGNIFICANT CHANGE UP (ref 2.1–4.9)
PLATELET # BLD AUTO: 350 K/UL — SIGNIFICANT CHANGE UP (ref 130–400)
POTASSIUM SERPL-MCNC: 4.8 MMOL/L — SIGNIFICANT CHANGE UP (ref 3.5–5)
POTASSIUM SERPL-SCNC: 4.8 MMOL/L — SIGNIFICANT CHANGE UP (ref 3.5–5)
PROT SERPL-MCNC: 6.8 G/DL — SIGNIFICANT CHANGE UP (ref 6–8)
RBC # BLD: 3.81 M/UL — LOW (ref 4.7–6.1)
RBC # FLD: 14.6 % — HIGH (ref 11.5–14.5)
SODIUM SERPL-SCNC: 144 MMOL/L — SIGNIFICANT CHANGE UP (ref 135–146)
WBC # BLD: 4.33 K/UL — LOW (ref 4.8–10.8)
WBC # FLD AUTO: 4.33 K/UL — LOW (ref 4.8–10.8)

## 2020-06-02 PROCEDURE — 99232 SBSQ HOSP IP/OBS MODERATE 35: CPT

## 2020-06-02 RX ADMIN — Medication 10 MILLIGRAM(S): at 21:51

## 2020-06-02 RX ADMIN — MEROPENEM 100 MILLIGRAM(S): 1 INJECTION INTRAVENOUS at 13:59

## 2020-06-02 RX ADMIN — MEROPENEM 100 MILLIGRAM(S): 1 INJECTION INTRAVENOUS at 06:22

## 2020-06-02 RX ADMIN — Medication 1 TABLET(S): at 05:25

## 2020-06-02 RX ADMIN — Medication 10 MILLIGRAM(S): at 12:24

## 2020-06-02 RX ADMIN — Medication 64.8 MILLIGRAM(S): at 12:25

## 2020-06-02 RX ADMIN — MEROPENEM 100 MILLIGRAM(S): 1 INJECTION INTRAVENOUS at 21:50

## 2020-06-02 RX ADMIN — Medication 1 TABLET(S): at 12:24

## 2020-06-02 RX ADMIN — ENOXAPARIN SODIUM 40 MILLIGRAM(S): 100 INJECTION SUBCUTANEOUS at 12:26

## 2020-06-02 RX ADMIN — Medication 10 MILLIGRAM(S): at 05:26

## 2020-06-02 RX ADMIN — TAMSULOSIN HYDROCHLORIDE 0.4 MILLIGRAM(S): 0.4 CAPSULE ORAL at 21:51

## 2020-06-02 RX ADMIN — Medication 10 MILLIGRAM(S): at 12:31

## 2020-06-02 RX ADMIN — Medication 1 TABLET(S): at 17:25

## 2020-06-02 NOTE — CHART NOTE - NSCHARTNOTEFT_GEN_A_CORE
I made rounds today with the treatment team including the hospitalist, residents,  nurses and  and discussed the patient's current medical status and discharge  planning needs, and reviewed the chart.    T(C): 35.6 (06-02-20 @ 13:00), Max: 37.1 (06-01-20 @ 21:00)  HR: 97 (06-02-20 @ 13:00) (71 - 97)  BP: 117/84 (06-02-20 @ 13:00) (103/59 - 117/84)  RR: 18 (06-02-20 @ 13:00) (18 - 18)  SpO2: 93% (06-02-20 @ 07:41) (93% - 99%)          I reached out to the patient's health care proxy/ responsible family member-           [     ]  I reached                                     and discussed the patient's medical condition,                   family concerns, and discharge planning           [     ]  I left a message with family               [ x    ]  I personally participated in rounds with the medical team and my resident and discussed the case. My resident reached                   family member/ HCP   akye     under my direction and supervision  and we reviewed the conversation.          [     ]  My resident left a message with family under my direction and supervision           [     ]   My resident attended medical rounds and called the family    The following was discussed: need to repeat swab . pt will need to be COVID negative to go to SNF to complete abx course then can return to group home           [     ] I spent 5-10 minutes on the above discussing medical issues with team members and family and/ or my resident    [ x    ] I spent 11-20 minutes on the above discussing medical issues with team members and family and/ or my resident    [     ] I spent 21-30 minutes on the above discussing medical issues with team members and family and/ or my resident

## 2020-06-02 NOTE — PROGRESS NOTE ADULT - ASSESSMENT
Patient is a 63 y/o male with PMH of cerebral palsy, seizure disorder, spastic paraplegia, s/p PEG tube, Asthma, H/O nephrolithiasis, BPH with bladder neck stricture s/p suprapubic catheter, and H/O Pseudomonas and ESBL Proteus mirabilis bacteruria who presented from a group with abdominal pain x1 days.     Todays Events: Tolerating PEg feeding, Awaiting Covid19 test results     IMPRESSION   COVID 19 +ve PCR on 5/27 resolved  but remains covid  positive   Urosepsis (resolving) 2/2 pyelonephritis with hydronephrosis and ureter stones with proteus ESBL bacteruria and bacteremia   BPH with bladder neck stricture s/p suprapubic catheter  Acute normocytic anemia- stable   Lactic acidosis: (resolved)   Functional quadriplegia   Cerebral palsy        PLAN   #COVID 19 +ve PCR on 5/27 resolved  but remains covid  positive   - COVID neg on 5/29 then pos 5/30 - will be RESWABBED 6/2 today   - d/c to group home pending neg swab    #urosepsis (resolving) 2/2 pyelonephritis with hydronephrosis and ureter stones with proteus ESBL bacteruria and bacteremia   - s/p R PCT nephrostomy with stent on 5/20, and Percutaneous nephrolithotomy ([facility]) 5/26,   - no further intervention by urology  - Pain control with Toradol PRN, monitor renal function   - c/w Tamsulosin and Bentyl to facilitate passage   - c/w Meropenem (proteus ESBL bacteruria and bacteremia), repeat Bcx negative on 5/22,on discharge Ertapenem 1 gram QD to complete 14 days ends on 6/8/2020  - monitor UO through right nephrostomy drainage, once zero for the next 24 hours, can be removed  - suprapubic catherter change Q4 weeks, due to change in 2 weeks       #lactic acidosis: (resolved) possibly secondary to nephrostomy now resolved   # Acute normocytic anemia: Hbg stable, likely dilutional, no signs of blood loss, monitor Hbg and keep active type and screening   # Chronic Cough is unlikely from  bronchitis   # Seizure disorder - Continue with phenobarbital 64.8mg PO through PEG tube QD no REEG negative   # Cerebral palsy w/ Spastic quadriplegia - Continue with baclofen 10mg PO three times a day, c/w PEG feeds   # Functional quadriplegia - full care  # Asthma - No wheezing on exam, on RA, Albuterol PRN   # BPH with bladder neck stricture s/p suprapubic catheter - Tube replaced day before admission, Chronic colonization w/ ESBL Proteus Mirabilis and Pseudomonas on last admission,    DVT Prophylaxis: Lovenox 40mg SQ once daily,   GI Prophylaxis: Not indicated   Diet: through PEG tube feeding  Activity: bed rest   Dispo: [facility], , Leslie ([phone]) is the sister and health care proxy, Mitch is the nurse at [facility] [phone],   Code Status: Full Code  Dispo: from [facility], medically stable d/c pending neg swab for COVID

## 2020-06-02 NOTE — PROGRESS NOTE ADULT - SUBJECTIVE AND OBJECTIVE BOX
LENGTH OF HOSPITAL STAY: 14d      CHIEF COMPLAINT:   Patient is a 64y old  Male who presents with a chief complaint of Abd pain and fever (01 Jun 2020 17:05)      OVER Past 24hrs:  The patient was seen and examined at bedside there were no acute events during the night        PAST MEDICAL & SURGICAL HISTORY  PAST MEDICAL & SURGICAL HISTORY:  Asthma  Urinary retention  Urinary calculi  Spastic quadriplegia  Osteoporosis  Seizure: last seizure &gt;10 years ago  Cerebral palsy  BPH (benign prostatic hyperplasia)  Suprapubic catheter  H/O cystoscopy  S/P percutaneous endoscopic gastrostomy (PEG) tube placement        REVIEW OF SYSTEMS  denies pain    ALLERGIES:  No Known Allergies    MEDICATIONS:  STANDING MEDICATIONS  baclofen 10 milliGRAM(s) Oral three times a day  calcium carbonate   1250 mG (OsCal) 1 Tablet(s) Oral two times a day  calcium carbonate   1250 mG (OsCal) 1 Tablet(s) Oral daily  dicyclomine 10 milliGRAM(s) Oral daily  enoxaparin Injectable 40 milliGRAM(s) SubCutaneous daily  meropenem  IVPB      meropenem  IVPB 1000 milliGRAM(s) IV Intermittent every 8 hours  PHENobarbital 64.8 milliGRAM(s) Oral daily  polyethylene glycol 3350 17 Gram(s) Oral every 12 hours  senna 2 Tablet(s) Oral at bedtime  tamsulosin 0.4 milliGRAM(s) Oral at bedtime    PRN MEDICATIONS  acetaminophen   Tablet .. 650 milliGRAM(s) Oral every 6 hours PRN  ALBUTerol    90 MICROgram(s) HFA Inhaler 2 Puff(s) Inhalation every 6 hours PRN  benzonatate 100 milliGRAM(s) Oral every 8 hours PRN  ondansetron Injectable 4 milliGRAM(s) IV Push every 6 hours PRN    VITALS:   T(F): 97.7  HR: 96  BP: 108/58  RR: 18  SpO2: 93%    PHYSICAL EXAM:  General: No acute distress.  Alert, oriented, interactive, nonfocal.    HEENT: Pupils equal, reactive to light symmetrically.    PULM: Clear to auscultation bilaterally, no significant sputum production.    CVS: Regular rate and rhythm, no murmurs, rubs, or gallops.    GI: Soft, nondistended, nontender, no masses.    MSK: No edema, nontender.    SKIN: Warm and well perfused, no rashes noted.    PSYCH:     NEURO:    LABS:                        12.3   4.33  )-----------( 350      ( 02 Jun 2020 06:10 )             37.3     06-02    144  |  105  |  20  ----------------------------<  112<H>  4.8   |  27  |  0.6<L>    Ca    9.0      02 Jun 2020 06:10  Phos  3.2     06-02  Mg     2.4     06-02    TPro  6.8  /  Alb  3.4<L>  /  TBili  0.4  /  DBili  x   /  AST  32  /  ALT  39  /  AlkPhos  96  06-02

## 2020-06-02 NOTE — PROGRESS NOTE ADULT - SUBJECTIVE AND OBJECTIVE BOX
TISH SHAIKH  64y, Male    All available historical data reviewed    OVERNIGHT EVENTS:  no fevers  94% RA  feels well and has no complaints     ROS:  unable to obtain history secondary to patient's mental status and/or sedation     VITALS:  T(F): 97.7, Max: 98.8 (06-01-20 @ 21:00)  HR: 96  BP: 108/58  RR: 18Vital Signs Last 24 Hrs  T(C): 36.5 (02 Jun 2020 09:02), Max: 37.1 (01 Jun 2020 21:00)  T(F): 97.7 (02 Jun 2020 09:02), Max: 98.8 (01 Jun 2020 21:00)  HR: 96 (02 Jun 2020 09:02) (71 - 96)  BP: 108/58 (02 Jun 2020 09:02) (100/57 - 110/61)  BP(mean): --  RR: 18 (02 Jun 2020 09:02) (18 - 18)  SpO2: 93% (02 Jun 2020 07:41) (93% - 99%)    TESTS & MEASUREMENTS:                        12.3   4.33  )-----------( 350      ( 02 Jun 2020 06:10 )             37.3     06-02    144  |  105  |  20  ----------------------------<  112<H>  4.8   |  27  |  0.6<L>    Ca    9.0      02 Jun 2020 06:10  Phos  3.2     06-02  Mg     2.4     06-02    TPro  6.8  /  Alb  3.4<L>  /  TBili  0.4  /  DBili  x   /  AST  32  /  ALT  39  /  AlkPhos  96  06-02    LIVER FUNCTIONS - ( 02 Jun 2020 06:10 )  Alb: 3.4 g/dL / Pro: 6.8 g/dL / ALK PHOS: 96 U/L / ALT: 39 U/L / AST: 32 U/L / GGT: x                   RADIOLOGY & ADDITIONAL TESTS:  Personal review of radiological diagnostics performed  Echo and EKG results noted when applicable.     MEDICATIONS:  acetaminophen   Tablet .. 650 milliGRAM(s) Oral every 6 hours PRN  ALBUTerol    90 MICROgram(s) HFA Inhaler 2 Puff(s) Inhalation every 6 hours PRN  baclofen 10 milliGRAM(s) Oral three times a day  benzonatate 100 milliGRAM(s) Oral every 8 hours PRN  calcium carbonate   1250 mG (OsCal) 1 Tablet(s) Oral two times a day  calcium carbonate   1250 mG (OsCal) 1 Tablet(s) Oral daily  dicyclomine 10 milliGRAM(s) Oral daily  enoxaparin Injectable 40 milliGRAM(s) SubCutaneous daily  meropenem  IVPB      meropenem  IVPB 1000 milliGRAM(s) IV Intermittent every 8 hours  ondansetron Injectable 4 milliGRAM(s) IV Push every 6 hours PRN  PHENobarbital 64.8 milliGRAM(s) Oral daily  polyethylene glycol 3350 17 Gram(s) Oral every 12 hours  senna 2 Tablet(s) Oral at bedtime  tamsulosin 0.4 milliGRAM(s) Oral at bedtime      ANTIBIOTICS:  meropenem  IVPB      meropenem  IVPB 1000 milliGRAM(s) IV Intermittent every 8 hours

## 2020-06-03 ENCOUNTER — APPOINTMENT (OUTPATIENT)
Dept: UROLOGY | Facility: CLINIC | Age: 65
End: 2020-06-03

## 2020-06-03 LAB
ALBUMIN SERPL ELPH-MCNC: 3.3 G/DL — LOW (ref 3.5–5.2)
ALP SERPL-CCNC: 87 U/L — SIGNIFICANT CHANGE UP (ref 30–115)
ALT FLD-CCNC: 39 U/L — SIGNIFICANT CHANGE UP (ref 0–41)
ANION GAP SERPL CALC-SCNC: 11 MMOL/L — SIGNIFICANT CHANGE UP (ref 7–14)
AST SERPL-CCNC: 36 U/L — SIGNIFICANT CHANGE UP (ref 0–41)
BASOPHILS # BLD AUTO: 0.04 K/UL — SIGNIFICANT CHANGE UP (ref 0–0.2)
BASOPHILS NFR BLD AUTO: 1 % — SIGNIFICANT CHANGE UP (ref 0–1)
BILIRUB SERPL-MCNC: 0.2 MG/DL — SIGNIFICANT CHANGE UP (ref 0.2–1.2)
BUN SERPL-MCNC: 22 MG/DL — HIGH (ref 10–20)
CALCIUM SERPL-MCNC: 9 MG/DL — SIGNIFICANT CHANGE UP (ref 8.5–10.1)
CHLORIDE SERPL-SCNC: 106 MMOL/L — SIGNIFICANT CHANGE UP (ref 98–110)
CO2 SERPL-SCNC: 29 MMOL/L — SIGNIFICANT CHANGE UP (ref 17–32)
CREAT SERPL-MCNC: 0.5 MG/DL — LOW (ref 0.7–1.5)
EOSINOPHIL # BLD AUTO: 0.27 K/UL — SIGNIFICANT CHANGE UP (ref 0–0.7)
EOSINOPHIL NFR BLD AUTO: 6.8 % — SIGNIFICANT CHANGE UP (ref 0–8)
GLUCOSE SERPL-MCNC: 118 MG/DL — HIGH (ref 70–99)
HCT VFR BLD CALC: 36.3 % — LOW (ref 42–52)
HGB BLD-MCNC: 12 G/DL — LOW (ref 14–18)
IMM GRANULOCYTES NFR BLD AUTO: 0.5 % — HIGH (ref 0.1–0.3)
LYMPHOCYTES # BLD AUTO: 1.01 K/UL — LOW (ref 1.2–3.4)
LYMPHOCYTES # BLD AUTO: 25.3 % — SIGNIFICANT CHANGE UP (ref 20.5–51.1)
MCHC RBC-ENTMCNC: 32.3 PG — HIGH (ref 27–31)
MCHC RBC-ENTMCNC: 33.1 G/DL — SIGNIFICANT CHANGE UP (ref 32–37)
MCV RBC AUTO: 97.8 FL — HIGH (ref 80–94)
MONOCYTES # BLD AUTO: 0.52 K/UL — SIGNIFICANT CHANGE UP (ref 0.1–0.6)
MONOCYTES NFR BLD AUTO: 13 % — HIGH (ref 1.7–9.3)
NEUTROPHILS # BLD AUTO: 2.13 K/UL — SIGNIFICANT CHANGE UP (ref 1.4–6.5)
NEUTROPHILS NFR BLD AUTO: 53.4 % — SIGNIFICANT CHANGE UP (ref 42.2–75.2)
NRBC # BLD: 0 /100 WBCS — SIGNIFICANT CHANGE UP (ref 0–0)
PLATELET # BLD AUTO: 307 K/UL — SIGNIFICANT CHANGE UP (ref 130–400)
POTASSIUM SERPL-MCNC: 4.5 MMOL/L — SIGNIFICANT CHANGE UP (ref 3.5–5)
POTASSIUM SERPL-SCNC: 4.5 MMOL/L — SIGNIFICANT CHANGE UP (ref 3.5–5)
PROT SERPL-MCNC: 6.7 G/DL — SIGNIFICANT CHANGE UP (ref 6–8)
RBC # BLD: 3.71 M/UL — LOW (ref 4.7–6.1)
RBC # FLD: 14.6 % — HIGH (ref 11.5–14.5)
SARS-COV-2 RNA SPEC QL NAA+PROBE: DETECTED
SODIUM SERPL-SCNC: 146 MMOL/L — SIGNIFICANT CHANGE UP (ref 135–146)
WBC # BLD: 3.99 K/UL — LOW (ref 4.8–10.8)
WBC # FLD AUTO: 3.99 K/UL — LOW (ref 4.8–10.8)

## 2020-06-03 PROCEDURE — 99232 SBSQ HOSP IP/OBS MODERATE 35: CPT | Mod: CS

## 2020-06-03 RX ADMIN — Medication 1 TABLET(S): at 17:28

## 2020-06-03 RX ADMIN — POLYETHYLENE GLYCOL 3350 17 GRAM(S): 17 POWDER, FOR SOLUTION ORAL at 17:28

## 2020-06-03 RX ADMIN — Medication 10 MILLIGRAM(S): at 12:39

## 2020-06-03 RX ADMIN — Medication 10 MILLIGRAM(S): at 05:22

## 2020-06-03 RX ADMIN — MEROPENEM 100 MILLIGRAM(S): 1 INJECTION INTRAVENOUS at 21:36

## 2020-06-03 RX ADMIN — MEROPENEM 100 MILLIGRAM(S): 1 INJECTION INTRAVENOUS at 14:30

## 2020-06-03 RX ADMIN — MEROPENEM 100 MILLIGRAM(S): 1 INJECTION INTRAVENOUS at 05:22

## 2020-06-03 RX ADMIN — POLYETHYLENE GLYCOL 3350 17 GRAM(S): 17 POWDER, FOR SOLUTION ORAL at 05:21

## 2020-06-03 RX ADMIN — Medication 1 TABLET(S): at 05:22

## 2020-06-03 RX ADMIN — Medication 64.8 MILLIGRAM(S): at 12:39

## 2020-06-03 RX ADMIN — Medication 10 MILLIGRAM(S): at 14:30

## 2020-06-03 RX ADMIN — TAMSULOSIN HYDROCHLORIDE 0.4 MILLIGRAM(S): 0.4 CAPSULE ORAL at 21:36

## 2020-06-03 RX ADMIN — ENOXAPARIN SODIUM 40 MILLIGRAM(S): 100 INJECTION SUBCUTANEOUS at 12:39

## 2020-06-03 RX ADMIN — Medication 10 MILLIGRAM(S): at 21:36

## 2020-06-03 RX ADMIN — Medication 1 TABLET(S): at 12:39

## 2020-06-03 NOTE — PROGRESS NOTE ADULT - SUBJECTIVE AND OBJECTIVE BOX
HAWATISH SCHUSTER  64y, Male    All available historical data reviewed    OVERNIGHT EVENTS:  no fevers  feels well and has no complaints   is responsive    ROS:  unable to obtain history secondary to patient's mental status and/or sedation     VITALS:  T(F): 96.5, Max: 97.9 (06-02-20 @ 21:13)  HR: 66  BP: 96/54  RR: 18Vital Signs Last 24 Hrs  T(C): 35.8 (03 Jun 2020 08:46), Max: 36.6 (02 Jun 2020 21:13)  T(F): 96.5 (03 Jun 2020 08:46), Max: 97.9 (02 Jun 2020 21:13)  HR: 66 (03 Jun 2020 08:46) (57 - 97)  BP: 96/54 (03 Jun 2020 08:46) (96/54 - 117/84)  BP(mean): --  RR: 18 (03 Jun 2020 08:46) (18 - 18)  SpO2: 94% (03 Jun 2020 08:46) (94% - 100%)    TESTS & MEASUREMENTS:                        12.0   3.99  )-----------( 307      ( 03 Jun 2020 07:33 )             36.3     06-03    146  |  106  |  22<H>  ----------------------------<  118<H>  4.5   |  29  |  0.5<L>    Ca    9.0      03 Jun 2020 07:33  Phos  3.2     06-02  Mg     2.4     06-02    TPro  6.7  /  Alb  3.3<L>  /  TBili  0.2  /  DBili  x   /  AST  36  /  ALT  39  /  AlkPhos  87  06-03    LIVER FUNCTIONS - ( 03 Jun 2020 07:33 )  Alb: 3.3 g/dL / Pro: 6.7 g/dL / ALK PHOS: 87 U/L / ALT: 39 U/L / AST: 36 U/L / GGT: x                   RADIOLOGY & ADDITIONAL TESTS:  Personal review of radiological diagnostics performed  Echo and EKG results noted when applicable.     MEDICATIONS:  acetaminophen   Tablet .. 650 milliGRAM(s) Oral every 6 hours PRN  ALBUTerol    90 MICROgram(s) HFA Inhaler 2 Puff(s) Inhalation every 6 hours PRN  baclofen 10 milliGRAM(s) Oral three times a day  benzonatate 100 milliGRAM(s) Oral every 8 hours PRN  calcium carbonate   1250 mG (OsCal) 1 Tablet(s) Oral two times a day  calcium carbonate   1250 mG (OsCal) 1 Tablet(s) Oral daily  dicyclomine 10 milliGRAM(s) Oral daily  enoxaparin Injectable 40 milliGRAM(s) SubCutaneous daily  meropenem  IVPB      meropenem  IVPB 1000 milliGRAM(s) IV Intermittent every 8 hours  ondansetron Injectable 4 milliGRAM(s) IV Push every 6 hours PRN  PHENobarbital 64.8 milliGRAM(s) Oral daily  polyethylene glycol 3350 17 Gram(s) Oral every 12 hours  senna 2 Tablet(s) Oral at bedtime  tamsulosin 0.4 milliGRAM(s) Oral at bedtime      ANTIBIOTICS:  meropenem  IVPB      meropenem  IVPB 1000 milliGRAM(s) IV Intermittent every 8 hours

## 2020-06-03 NOTE — PROGRESS NOTE ADULT - ASSESSMENT
· Assessment		  63 y/o male with PMH of cerebral palsy, seizure disorder, spastic paraplegia, s/p PEG tube, Asthma, H/O nephrolithiasis, BPH with bladder neck stricture s/p suprapubic catheter, and H/O Pseudomonas and ESBL Proteus mirabilis bacteruria who presented from a group with abdominal pain x1 days found to have R sided nephrolithiasis with hydronephrosis. Pt also has recent URI symptoms r/o COVID.     IMPRESSION;   Resolved sepsis secondary to acute Right pyelonephritis with right hydronephrosis with right ureteral stone  S/p R PCN by IVR 5/20  IVR Procedure: 5/26   Over-the-wire right nephrostogram and removal of right nephroureteral catheter  BCx 5/19 CoNS ( not a true pathogen)  BCx 5/20  P mirabilis ESBl  BCx 5/22 NG  UCx 5/19 P mirabilis ESBL  CT A/P - right hydronephrosis with a 9 x 7 x 10 mm proximal right ureteral calculus and ureteral enhancement   PROCEDURES:  Nephrostogram 26-May-2020 15:34:10 right Bonnie Cooper  Replacement of external ureteral stent 26-May-2020 15:34:02 right Bonnie Cooper  Antegrade ureteroscopy 26-May-2020 15:33:44 right Bonnie Cooper  Percutaneous removal of calculus of kidney, 2 cm or more in diameter 26-May-  COVID19 POSITIVE 5/27 after being repeatedly NG  COVID19 NG 5/29  COVID-19 positive 5/30,6/2  ferritin 345  Do not see any evidence of active disease. No hypoxemia  CT abdomen 5/27 : left pleural effusion. No ground glass opacities    RECOMMENDATIONS;  home on ertapenem 1 gm iv q24h for 14 days starting 5/26. Till then continue with meropenem  no treatment for COVID-19

## 2020-06-03 NOTE — PROGRESS NOTE ADULT - ASSESSMENT
Assessment and Plan:     65 y/o male with PMH of cerebral palsy, seizure disorder, spastic paraplegia, s/p PEG tube, Asthma, H/O nephrolithiasis, BPH with bladder neck stricture s/p suprapubic catheter, and H/O Pseudomonas and ESBL Proteus mirabilis bacteruria who presented from a group with abdominal pain x1 days.   Today's Events: Tolerating PEg feeding, Awaiting Covid19 test results     IMPRESSION   COVID 19 +ve PCR on 5/27 resolved  but remains covid  positive   Urosepsis (resolving) 2/2 pyelonephritis with hydronephrosis and ureter stones with proteus ESBL bacteruria and bacteremia   BPH with bladder neck stricture s/p suprapubic catheter  Acute normocytic anemia- stable   Lactic acidosis: (resolved)   Functional quadriplegia   Cerebral palsy    PLAN   #COVID 19+ve PCR on 5/27 resolved but remains covid  positive   - COVID neg on 5/29 then pos 5/30, positive on 6/2. Will need to reswab before discharge (reswab 6/5)  - remains stable on RA (O2 sat 94%)  - d/c to group home once neg swab    #urosepsis (resolving) 2/2 pyelonephritis with hydronephrosis and ureter stones with proteus ESBL bacteruria and bacteremia   - s/p R PCT nephrostomy with stent on 5/20, and Percutaneous nephrolithotomy ([facility]) 5/26,   - no further intervention by urology  - Pain control with Toradol PRN, monitor renal function   - c/w Tamsulosin and Bentyl to facilitate passage   - c/w Meropenem (proteus ESBL bacteruria and bacteremia), repeat Bcx negative on 5/22,on discharge Ertapenem 1 gram QD to complete 14 days ends on 6/8/2020  - monitor UO through right nephrostomy drainage, once zero for the next 24 hours, can be removed  - suprapubic catherter change Q4 weeks, due to change in 2 weeks     #lactic acidosis: (resolved) possibly secondary to nephrostomy now resolved   # Acute normocytic anemia: Hbg stable, likely dilutional, no signs of blood loss, monitor Hbg and keep active type and screening   # Chronic Cough is unlikely from bronchitis   # Seizure disorder - continue with phenobarbital 64.8mg PO through PEG tube QD, no REEG negative   # Cerebral palsy w/ Spastic quadriplegia - Continue with baclofen 10mg PO three times a day, c/w PEG feeds   # Functional quadriplegia - full care  # Asthma - No wheezing on exam, on RA, Albuterol PRN   # BPH with bladder neck stricture s/p suprapubic catheter - Tube replaced day before admission, Chronic colonization w/ ESBL Proteus Mirabilis and Pseudomonas on last admission,    DVT Prophylaxis: Lovenox 40mg SQ once daily  GI Prophylaxis: Not indicated   Diet: through PEG tube feeding  Activity: bed rest   Dispo: [facility], Leslie ([phone]) is the sister and health care proxy, Mitch is the nurse at [facility] [phone],   Code Status: Full Code  Dispo: from [facility], medically stable d/c pending neg swab for COVID

## 2020-06-03 NOTE — PROGRESS NOTE ADULT - SUBJECTIVE AND OBJECTIVE BOX
Hospital Day:  15d    Subjective:    Patient is a 64y old  Male who presents with a chief complaint of Abd pain and fever. Overnight no issues. This morning resting comfortably in bed. ROS negative for acute symptoms.       Past Medical Hx:   Asthma  Urinary retention  Urinary calculi  Spastic quadriplegia  Osteoporosis  Seizure  Cerebral palsy  BPH (benign prostatic hyperplasia)    Past Sx:  Suprapubic catheter  History of suprapubic catheter  H/O cystoscopy  S/P percutaneous endoscopic gastrostomy (PEG) tube placement    Allergies:  No Known Allergies    Current Meds:   Standng Meds:  baclofen 10 milliGRAM(s) Oral three times a day  calcium carbonate   1250 mG (OsCal) 1 Tablet(s) Oral two times a day  calcium carbonate   1250 mG (OsCal) 1 Tablet(s) Oral daily  dicyclomine 10 milliGRAM(s) Oral daily  enoxaparin Injectable 40 milliGRAM(s) SubCutaneous daily  meropenem  IVPB      meropenem  IVPB 1000 milliGRAM(s) IV Intermittent every 8 hours  PHENobarbital 64.8 milliGRAM(s) Oral daily  polyethylene glycol 3350 17 Gram(s) Oral every 12 hours  senna 2 Tablet(s) Oral at bedtime  tamsulosin 0.4 milliGRAM(s) Oral at bedtime    PRN Meds:  acetaminophen   Tablet .. 650 milliGRAM(s) Oral every 6 hours PRN Temp greater or equal to 38C (100.4F)  ALBUTerol    90 MICROgram(s) HFA Inhaler 2 Puff(s) Inhalation every 6 hours PRN Shortness of Breath and/or Wheezing  benzonatate 100 milliGRAM(s) Oral every 8 hours PRN Cough  ondansetron Injectable 4 milliGRAM(s) IV Push every 6 hours PRN Nausea    HOME MEDICATIONS:  Artificial Tears ophthalmic solution: 1 drop(s) to each affected eye 4 times a day  baclofen 10 mg oral tablet: 1 tab(s) by gastrostomy tube 3 times a day  benzonatate 100 mg oral capsule: 1 cap(s) orally every 8 hours, As needed, Cough  dicyclomine 10 mg oral capsule: 1 cap(s) orally once a day  docusate sodium 60 mg/15 mL oral syrup: 100 milligram(s) by gastrostomy tube once a day, As Needed for constipation  guaifenesin-dextromethorphan 100 mg-10 mg/5 mL oral liquid: 5 milliliter(s) orally every 6 hours, As needed, Cough  magnesium oxide 400 mg (241.3 mg elemental magnesium) oral tablet: 1 tab(s) orally 3 times a day (with meals)  Oysco 500 (1250 mg calcium carbonate) oral tablet: 1 tab(s) by gastrostomy tube 2 times a day  PHENobarbital 64.8 mg oral tablet: 1 tab(s) orally once a day via G tube  senna oral tablet: 2 tab(s) orally once a day (at bedtime)  tamsulosin 0.4 mg oral capsule: 1 cap(s) orally once a day (at bedtime)  Ventolin HFA 90 mcg/inh inhalation aerosol: 2 puff(s) inhaled 4 times a day, As Needed    Vital Signs:  T(F): 96.5 (06-03-20 @ 08:46), Max: 97.9 (06-02-20 @ 21:13)  HR: 66 (06-03-20 @ 08:46) (57 - 97)  BP: 96/54 (06-03-20 @ 08:46) (96/54 - 117/84)  RR: 18 (06-03-20 @ 08:46) (18 - 18)  SpO2: 94% (06-03-20 @ 08:46) (94% - 100%)    06-02-20 @ 07:01  -  06-03-20 @ 07:00  --------------------------------------------------------  IN: 1700 mL / OUT: 300 mL / NET: 1400 mL    Physical Exam:  GENERAL: NAD  HEENT: NCAT  CHEST/LUNG: CTAB  HEART: Regular rate and rhythm; s1 s2 appreciated, No murmurs, rubs, or gallops  ABDOMEN: Soft, Nontender, Nondistended; Bowel sounds present  EXTREMITIES: No LE edema b/l  SKIN: no rashes, no new lesions  NERVOUS SYSTEM:  Alert & Oriented X3  LINES/CATHETERS:        Labs:                         12.0   3.99  )-----------( 307      ( 03 Jun 2020 07:33 )             36.3     Neutophil% 53.4, Lymphocyte% 25.3, Monocyte% 13.0, Bands% 0.5 06-03-20 @ 07:33    03 Jun 2020 07:33    146    |  106    |  22     ----------------------------<  118    4.5     |  29     |  0.5      Ca    9.0        03 Jun 2020 07:33  Phos  3.2       02 Jun 2020 06:10  Mg     2.4       02 Jun 2020 06:10    TPro  6.7    /  Alb  3.3    /  TBili  0.2    /  DBili  x      /  AST  36     /  ALT  39     /  AlkPhos  87     03 Jun 2020 07:33    Radiology:

## 2020-06-03 NOTE — CHART NOTE - NSCHARTNOTEFT_GEN_A_CORE
I made rounds today with the treatment team including the hospitalist, residents,  nurses and  and discussed the patient's current medical status and discharge  planning needs, and reviewed the chart.    T(C): 36.3 (06-03-20 @ 12:57), Max: 36.6 (06-02-20 @ 21:13)  HR: 69 (06-03-20 @ 12:57) (57 - 82)  BP: 103/58 (06-03-20 @ 12:57) (96/54 - 111/63)  RR: 18 (06-03-20 @ 12:57) (18 - 18)  SpO2: 93% (06-03-20 @ 12:57) (93% - 100%)          I reached out to the patient's health care proxy/ responsible family member-           [     ]  I reached                                     and discussed the patient's medical condition,                   family concerns, and discharge planning           [     ]  I left a message with family               [ x    ]  I personally participated in rounds with the medical team and my resident and discussed the case. My resident reached                   family member/ HCP       kaye  under my direction and supervision  and we reviewed the conversation.          [     ]  My resident left a message with family under my direction and supervision           [     ]   My resident attended medical rounds and called the family    The following was discussed: pt on room air form group home is on abx until june 8. needs to be neg covid before DC   is AVSS          [     ] I spent 5-10 minutes on the above discussing medical issues with team members and family and/ or my resident    [   x  ] I spent 11-20 minutes on the above discussing medical issues with team members and family and/ or my resident    [     ] I spent 21-30 minutes on the above discussing medical issues with team members and family and/ or my resident

## 2020-06-04 LAB
ANION GAP SERPL CALC-SCNC: 12 MMOL/L — SIGNIFICANT CHANGE UP (ref 7–14)
BASOPHILS # BLD AUTO: 0.04 K/UL — SIGNIFICANT CHANGE UP (ref 0–0.2)
BASOPHILS NFR BLD AUTO: 1.2 % — HIGH (ref 0–1)
BUN SERPL-MCNC: 24 MG/DL — HIGH (ref 10–20)
CALCIUM SERPL-MCNC: 9.6 MG/DL — SIGNIFICANT CHANGE UP (ref 8.5–10.1)
CHLORIDE SERPL-SCNC: 106 MMOL/L — SIGNIFICANT CHANGE UP (ref 98–110)
CO2 SERPL-SCNC: 30 MMOL/L — SIGNIFICANT CHANGE UP (ref 17–32)
CREAT SERPL-MCNC: 0.6 MG/DL — LOW (ref 0.7–1.5)
EOSINOPHIL # BLD AUTO: 0.28 K/UL — SIGNIFICANT CHANGE UP (ref 0–0.7)
EOSINOPHIL NFR BLD AUTO: 8.2 % — HIGH (ref 0–8)
GLUCOSE SERPL-MCNC: 82 MG/DL — SIGNIFICANT CHANGE UP (ref 70–99)
HCT VFR BLD CALC: 36.6 % — LOW (ref 42–52)
HGB BLD-MCNC: 12.3 G/DL — LOW (ref 14–18)
IMM GRANULOCYTES NFR BLD AUTO: 0.3 % — SIGNIFICANT CHANGE UP (ref 0.1–0.3)
LYMPHOCYTES # BLD AUTO: 0.95 K/UL — LOW (ref 1.2–3.4)
LYMPHOCYTES # BLD AUTO: 27.7 % — SIGNIFICANT CHANGE UP (ref 20.5–51.1)
MAGNESIUM SERPL-MCNC: 2.3 MG/DL — SIGNIFICANT CHANGE UP (ref 1.8–2.4)
MCHC RBC-ENTMCNC: 32.6 PG — HIGH (ref 27–31)
MCHC RBC-ENTMCNC: 33.6 G/DL — SIGNIFICANT CHANGE UP (ref 32–37)
MCV RBC AUTO: 97.1 FL — HIGH (ref 80–94)
MONOCYTES # BLD AUTO: 0.41 K/UL — SIGNIFICANT CHANGE UP (ref 0.1–0.6)
MONOCYTES NFR BLD AUTO: 12 % — HIGH (ref 1.7–9.3)
NEUTROPHILS # BLD AUTO: 1.74 K/UL — SIGNIFICANT CHANGE UP (ref 1.4–6.5)
NEUTROPHILS NFR BLD AUTO: 50.6 % — SIGNIFICANT CHANGE UP (ref 42.2–75.2)
NRBC # BLD: 0 /100 WBCS — SIGNIFICANT CHANGE UP (ref 0–0)
PHOSPHATE SERPL-MCNC: 3.2 MG/DL — SIGNIFICANT CHANGE UP (ref 2.1–4.9)
PLATELET # BLD AUTO: 338 K/UL — SIGNIFICANT CHANGE UP (ref 130–400)
POTASSIUM SERPL-MCNC: 4.3 MMOL/L — SIGNIFICANT CHANGE UP (ref 3.5–5)
POTASSIUM SERPL-SCNC: 4.3 MMOL/L — SIGNIFICANT CHANGE UP (ref 3.5–5)
RBC # BLD: 3.77 M/UL — LOW (ref 4.7–6.1)
RBC # FLD: 14.6 % — HIGH (ref 11.5–14.5)
SODIUM SERPL-SCNC: 148 MMOL/L — HIGH (ref 135–146)
WBC # BLD: 3.43 K/UL — LOW (ref 4.8–10.8)
WBC # FLD AUTO: 3.43 K/UL — LOW (ref 4.8–10.8)

## 2020-06-04 PROCEDURE — 99232 SBSQ HOSP IP/OBS MODERATE 35: CPT | Mod: CS

## 2020-06-04 RX ADMIN — MEROPENEM 100 MILLIGRAM(S): 1 INJECTION INTRAVENOUS at 21:22

## 2020-06-04 RX ADMIN — Medication 10 MILLIGRAM(S): at 21:22

## 2020-06-04 RX ADMIN — Medication 1 TABLET(S): at 17:17

## 2020-06-04 RX ADMIN — Medication 10 MILLIGRAM(S): at 12:00

## 2020-06-04 RX ADMIN — MEROPENEM 100 MILLIGRAM(S): 1 INJECTION INTRAVENOUS at 11:59

## 2020-06-04 RX ADMIN — ENOXAPARIN SODIUM 40 MILLIGRAM(S): 100 INJECTION SUBCUTANEOUS at 12:00

## 2020-06-04 RX ADMIN — Medication 10 MILLIGRAM(S): at 05:09

## 2020-06-04 RX ADMIN — Medication 1 TABLET(S): at 05:09

## 2020-06-04 RX ADMIN — MEROPENEM 100 MILLIGRAM(S): 1 INJECTION INTRAVENOUS at 05:10

## 2020-06-04 RX ADMIN — SENNA PLUS 2 TABLET(S): 8.6 TABLET ORAL at 21:22

## 2020-06-04 RX ADMIN — TAMSULOSIN HYDROCHLORIDE 0.4 MILLIGRAM(S): 0.4 CAPSULE ORAL at 21:22

## 2020-06-04 RX ADMIN — Medication 64.8 MILLIGRAM(S): at 12:50

## 2020-06-04 NOTE — PROGRESS NOTE ADULT - SUBJECTIVE AND OBJECTIVE BOX
LENGTH OF HOSPITAL STAY: 16d      CHIEF COMPLAINT:   Patient is a 64y old  Male who presents with a chief complaint of Abd pain and fever (01 Jun 2020 17:05)      OVER Past 24hrs:  The patient was seen and examined at bedside there were no acute events  during the night        PAST MEDICAL & SURGICAL HISTORY  PAST MEDICAL & SURGICAL HISTORY:  Asthma  Urinary retention  Urinary calculi  Spastic quadriplegia  Osteoporosis  Seizure: last seizure &gt;10 years ago  Cerebral palsy  BPH (benign prostatic hyperplasia)  Suprapubic catheter  H/O cystoscopy  S/P percutaneous endoscopic gastrostomy (PEG) tube placement              ALLERGIES:  No Known Allergies    MEDICATIONS:  STANDING MEDICATIONS  baclofen 10 milliGRAM(s) Oral three times a day  calcium carbonate   1250 mG (OsCal) 1 Tablet(s) Oral two times a day  calcium carbonate   1250 mG (OsCal) 1 Tablet(s) Oral daily  dicyclomine 10 milliGRAM(s) Oral daily  enoxaparin Injectable 40 milliGRAM(s) SubCutaneous daily  meropenem  IVPB      meropenem  IVPB 1000 milliGRAM(s) IV Intermittent every 8 hours  PHENobarbital 64.8 milliGRAM(s) Oral daily  polyethylene glycol 3350 17 Gram(s) Oral every 12 hours  senna 2 Tablet(s) Oral at bedtime  tamsulosin 0.4 milliGRAM(s) Oral at bedtime    PRN MEDICATIONS  acetaminophen   Tablet .. 650 milliGRAM(s) Oral every 6 hours PRN  ALBUTerol    90 MICROgram(s) HFA Inhaler 2 Puff(s) Inhalation every 6 hours PRN  benzonatate 100 milliGRAM(s) Oral every 8 hours PRN  ondansetron Injectable 4 milliGRAM(s) IV Push every 6 hours PRN    VITALS:   T(F): 97.4  HR: 78  BP: 111/89  RR: 17  SpO2: 94%    PHYSICAL EXAM:  General: No acute distress.   interactive,     HEENT: Pupils equal, reactive to light symmetrically.    PULM: Clear to auscultation bilaterally decreased breath sounds in lower lobes, no significant sputum production.    CVS: Regular rate and rhythm, no murmurs, rubs, or gallops.    GI: Soft, nondistended, nontender, no masses. PEG noted no drainage     URINARY: Suprapubic Cath  noted     MSK: No edema, nontender. Quadriplegic     SKIN: Warm and well perfused, no rashes noted.    PSYCH: at baseline       LABS:                        12.3   3.43  )-----------( 338      ( 04 Jun 2020 06:35 )             36.6     06-04    148<H>  |  106  |  24<H>  ----------------------------<  82  4.3   |  30  |  0.6<L>    Ca    9.6      04 Jun 2020 06:35  Phos  3.2     06-04  Mg     2.3     06-04    TPro  6.7  /  Alb  3.3<L>  /  TBili  0.2  /  DBili  x   /  AST  36  /  ALT  39  /  AlkPhos  87  06-03

## 2020-06-04 NOTE — PROGRESS NOTE ADULT - ASSESSMENT
Patient is a 65 y/o male with PMH of cerebral palsy, seizure disorder, spastic paraplegia, s/p PEG tube, Asthma, H/O nephrolithiasis, BPH with bladder neck stricture s/p suprapubic catheter, and H/O Pseudomonas and ESBL Proteus mirabilis bacteruria who presented from a group with abdominal pain x1 days.         IMPRESSION   COVID 19 +ve PCR on 5/27 resolved  but remains covid  positive   Urosepsis (resolving) 2/2 pyelonephritis with hydronephrosis and ureter stones with proteus ESBL bacteruria and bacteremia   BPH with bladder neck stricture s/p suprapubic catheter  Acute normocytic anemia- stable   Lactic acidosis: (resolved)   Functional quadriplegia   Cerebral palsy        PLAN   #COVID 19 +ve PCR on 5/27 resolved  but remains covid  positive   - COVID neg on 5/29 then pos 5/30 -  RESWABBED 6/2 posistive  - d/c to group home pending neg swab    #urosepsis (resolving) 2/2 pyelonephritis with hydronephrosis and ureter stones with proteus ESBL bacteruria and bacteremia   - s/p R PCT nephrostomy with stent on 5/20, and Percutaneous nephrolithotomy (PCNL) 5/26,   - no further intervention by urology  - Pain control with Toradol PRN, monitor renal function   - c/w Tamsulosin and Bentyl to facilitate passage   - c/w Meropenem (proteus ESBL bacteruria and bacteremia), repeat Bcx negative on 5/22,on discharge Ertapenem 1 gram QD to complete 14 days ends on 6/8/2020  - monitor UO through right nephrostomy drainage, once zero for the next 24 hours, can be removed  - suprapubic catheter change Q4 weeks, due to change in 1 weeks       #lactic acidosis: (resolved) possibly secondary to nephrostomy now resolved   # Acute normocytic anemia: Hbg stable, likely dilutional, no signs of blood loss, monitor Hbg and keep active type and screening   # Chronic Cough is unlikely from  bronchitis   # Seizure disorder - Continue with phenobarbital 64.8mg PO through PEG tube QD no REEG negative   # Cerebral palsy w/ Spastic quadriplegia - Continue with baclofen 10mg PO three times a day, c/w PEG feeds   # Functional quadriplegia - full care  # Asthma - No wheezing on exam, on RA, Albuterol PRN   # BPH with bladder neck stricture s/p suprapubic catheter - Tube replaced day before admission, Chronic colonization w/ ESBL Proteus Mirabilis and Pseudomonas on last admission,    DVT Prophylaxis: Lovenox 40mg SQ once daily,   GI Prophylaxis: Not indicated   Diet: through PEG tube feeding  Activity: bed rest   Dispo: [facility], , Leslie ([phone]) is the sister and health care proxy, Mitch is the nurse at [facility] [phone],   Code Status: Full Code  Dispo: from [facility], medically stable d/c pending neg swab for COVID

## 2020-06-04 NOTE — CHART NOTE - NSCHARTNOTEFT_GEN_A_CORE
I made rounds today with the treatment team including the hospitalist, residents,  nurses and  and discussed the patient's current medical status and discharge  planning needs, and reviewed the chart.    T(C): 36.3 (06-04-20 @ 12:40), Max: 36.5 (06-04-20 @ 08:41)  HR: 78 (06-04-20 @ 12:40) (65 - 90)  BP: 111/89 (06-04-20 @ 12:40) (94/57 - 111/89)  RR: 17 (06-04-20 @ 12:40) (16 - 18)  SpO2: 94% (06-04-20 @ 12:40) (90% - 96%)          I reached out to the patient's health care proxy/ responsible family member-           [     ]  I reached                                     and discussed the patient's medical condition,                   family concerns, and discharge planning           [     ]  I left a message with family               [  x   ]  I personally participated in rounds with the medical team and my resident and discussed the case. My resident reached                   family member/ HCP   kaye  under my direction and supervision  and we reviewed the conversation.          [     ]  My resident left a message with family under my direction and supervision           [     ]   My resident attended medical rounds and called the family    The following was discussed: updated her on pts vitals lab work is on abx. needs a repeat swab before goes back to group  home , but is so far positive           [     ] I spent 5-10 minutes on the above discussing medical issues with team members and family and/ or my resident    [   x  ] I spent 11-20 minutes on the above discussing medical issues with team members and family and/ or my resident    [     ] I spent 21-30 minutes on the above discussing medical issues with team members and family and/ or my resident

## 2020-06-04 NOTE — PROGRESS NOTE ADULT - SUBJECTIVE AND OBJECTIVE BOX
TISH SHAIKH  64y, Male    All available historical data reviewed    OVERNIGHT EVENTS:  no fevers  does respond  denies cough/SOB/CP  94% RA    ROS:  unable to obtain history secondary to patient's mental status and/or sedation     VITALS:  T(F): 97.7, Max: 97.7 (06-04-20 @ 08:41)  HR: 90  BP: 99/54  RR: 17Vital Signs Last 24 Hrs  T(C): 36.5 (04 Jun 2020 08:41), Max: 36.5 (04 Jun 2020 08:41)  T(F): 97.7 (04 Jun 2020 08:41), Max: 97.7 (04 Jun 2020 08:41)  HR: 90 (04 Jun 2020 08:41) (65 - 90)  BP: 99/54 (04 Jun 2020 08:41) (94/57 - 103/58)  BP(mean): --  RR: 17 (04 Jun 2020 08:41) (16 - 18)  SpO2: 90% (04 Jun 2020 08:41) (90% - 96%)    TESTS & MEASUREMENTS:                        12.3   3.43  )-----------( 338      ( 04 Jun 2020 06:35 )             36.6     06-04    148<H>  |  106  |  24<H>  ----------------------------<  82  4.3   |  30  |  0.6<L>    Ca    9.6      04 Jun 2020 06:35  Phos  3.2     06-04  Mg     2.3     06-04    TPro  6.7  /  Alb  3.3<L>  /  TBili  0.2  /  DBili  x   /  AST  36  /  ALT  39  /  AlkPhos  87  06-03    LIVER FUNCTIONS - ( 03 Jun 2020 07:33 )  Alb: 3.3 g/dL / Pro: 6.7 g/dL / ALK PHOS: 87 U/L / ALT: 39 U/L / AST: 36 U/L / GGT: x                   RADIOLOGY & ADDITIONAL TESTS:  Personal review of radiological diagnostics performed  Echo and EKG results noted when applicable.     MEDICATIONS:  acetaminophen   Tablet .. 650 milliGRAM(s) Oral every 6 hours PRN  ALBUTerol    90 MICROgram(s) HFA Inhaler 2 Puff(s) Inhalation every 6 hours PRN  baclofen 10 milliGRAM(s) Oral three times a day  benzonatate 100 milliGRAM(s) Oral every 8 hours PRN  calcium carbonate   1250 mG (OsCal) 1 Tablet(s) Oral two times a day  calcium carbonate   1250 mG (OsCal) 1 Tablet(s) Oral daily  dicyclomine 10 milliGRAM(s) Oral daily  enoxaparin Injectable 40 milliGRAM(s) SubCutaneous daily  meropenem  IVPB      meropenem  IVPB 1000 milliGRAM(s) IV Intermittent every 8 hours  ondansetron Injectable 4 milliGRAM(s) IV Push every 6 hours PRN  PHENobarbital 64.8 milliGRAM(s) Oral daily  polyethylene glycol 3350 17 Gram(s) Oral every 12 hours  senna 2 Tablet(s) Oral at bedtime  tamsulosin 0.4 milliGRAM(s) Oral at bedtime      ANTIBIOTICS:  meropenem  IVPB      meropenem  IVPB 1000 milliGRAM(s) IV Intermittent every 8 hours

## 2020-06-05 PROCEDURE — 99232 SBSQ HOSP IP/OBS MODERATE 35: CPT | Mod: CS

## 2020-06-05 RX ADMIN — Medication 1 TABLET(S): at 05:52

## 2020-06-05 RX ADMIN — SENNA PLUS 2 TABLET(S): 8.6 TABLET ORAL at 21:03

## 2020-06-05 RX ADMIN — MEROPENEM 100 MILLIGRAM(S): 1 INJECTION INTRAVENOUS at 11:09

## 2020-06-05 RX ADMIN — POLYETHYLENE GLYCOL 3350 17 GRAM(S): 17 POWDER, FOR SOLUTION ORAL at 17:17

## 2020-06-05 RX ADMIN — TAMSULOSIN HYDROCHLORIDE 0.4 MILLIGRAM(S): 0.4 CAPSULE ORAL at 21:03

## 2020-06-05 RX ADMIN — Medication 10 MILLIGRAM(S): at 05:52

## 2020-06-05 RX ADMIN — MEROPENEM 100 MILLIGRAM(S): 1 INJECTION INTRAVENOUS at 05:52

## 2020-06-05 RX ADMIN — Medication 10 MILLIGRAM(S): at 11:07

## 2020-06-05 RX ADMIN — ENOXAPARIN SODIUM 40 MILLIGRAM(S): 100 INJECTION SUBCUTANEOUS at 11:07

## 2020-06-05 RX ADMIN — Medication 10 MILLIGRAM(S): at 21:03

## 2020-06-05 RX ADMIN — POLYETHYLENE GLYCOL 3350 17 GRAM(S): 17 POWDER, FOR SOLUTION ORAL at 05:52

## 2020-06-05 RX ADMIN — Medication 1 TABLET(S): at 11:07

## 2020-06-05 RX ADMIN — Medication 64.8 MILLIGRAM(S): at 11:07

## 2020-06-05 NOTE — PROGRESS NOTE ADULT - ASSESSMENT
63 y/o male with PMH of cerebral palsy, seizure disorder, spastic paraplegia, s/p PEG tube, Asthma, H/O nephrolithiasis, BPH with bladder neck stricture s/p suprapubic catheter, and H/O Pseudomonas and ESBL Proteus mirabilis bacteruria who presented from a group with abdominal pain x1 days found to have R sided nephrolithiasis with hydronephrosis. Pt also has recent URI symptoms r/o COVID.     IMPRESSION;   Resolved sepsis secondary to acute Right pyelonephritis with right hydronephrosis with right ureteral stone  S/p R PCN by IVR 5/20  IVR Procedure: 5/26   Over-the-wire right nephrostogram and removal of right nephroureteral catheter  BCx 5/19 CoNS ( not a true pathogen)  BCx 5/20  P mirabilis ESBl  BCx 5/22 NG  UCx 5/19 P mirabilis ESBL  CT A/P - right hydronephrosis with a 9 x 7 x 10 mm proximal right ureteral calculus and ureteral enhancement   PROCEDURES:  Nephrostogram 26-May-2020 15:34:10 right Bonnie Cooper  Replacement of external ureteral stent 26-May-2020 15:34:02 right Bonnie Cooper  Antegrade ureteroscopy 26-May-2020 15:33:44 right Bonnie Cooper  Percutaneous removal of calculus of kidney, 2 cm or more in diameter 26-May-    COVID19 POSITIVE 5/27 after being repeatedly NG  COVID19 NG 5/29  COVID-19 positive 5/30,6/2  ferritin 345  Do not see any evidence of active disease. No hypoxemia  CT abdomen 5/27 : left pleural effusion. No ground glass opacities    RECOMMENDATIONS;  home on ertapenem 1 gm iv q24h for 14 days starting 5/26. Till then continue with meropenem  no treatment for COVID-19  recall prn please

## 2020-06-05 NOTE — PROGRESS NOTE ADULT - SUBJECTIVE AND OBJECTIVE BOX
TISH SHAIKH  64y, Male    All available historical data reviewed    OVERNIGHT EVENTS:  no fevers  feels well and has no complaints   95% RA    ROS:  unable to obtain history secondary to patient's mental status and/or sedation     VITALS:  T(F): 97, Max: 97.7 (06-04-20 @ 17:21)  HR: 65  BP: 97/61  RR: 18Vital Signs Last 24 Hrs  T(C): 36.1 (05 Jun 2020 05:10), Max: 36.5 (04 Jun 2020 17:21)  T(F): 97 (05 Jun 2020 05:10), Max: 97.7 (04 Jun 2020 17:21)  HR: 65 (05 Jun 2020 05:10) (65 - 84)  BP: 97/61 (05 Jun 2020 05:10) (97/61 - 112/63)  BP(mean): --  RR: 18 (05 Jun 2020 05:10) (16 - 18)  SpO2: 91% (05 Jun 2020 05:10) (91% - 96%)    TESTS & MEASUREMENTS:                        12.3   3.43  )-----------( 338      ( 04 Jun 2020 06:35 )             36.6     06-04    148<H>  |  106  |  24<H>  ----------------------------<  82  4.3   |  30  |  0.6<L>    Ca    9.6      04 Jun 2020 06:35  Phos  3.2     06-04  Mg     2.3     06-04                RADIOLOGY & ADDITIONAL TESTS:  Personal review of radiological diagnostics performed  Echo and EKG results noted when applicable.     MEDICATIONS:  acetaminophen   Tablet .. 650 milliGRAM(s) Oral every 6 hours PRN  ALBUTerol    90 MICROgram(s) HFA Inhaler 2 Puff(s) Inhalation every 6 hours PRN  baclofen 10 milliGRAM(s) Oral three times a day  benzonatate 100 milliGRAM(s) Oral every 8 hours PRN  calcium carbonate   1250 mG (OsCal) 1 Tablet(s) Oral two times a day  calcium carbonate   1250 mG (OsCal) 1 Tablet(s) Oral daily  dicyclomine 10 milliGRAM(s) Oral daily  enoxaparin Injectable 40 milliGRAM(s) SubCutaneous daily  meropenem  IVPB      meropenem  IVPB 1000 milliGRAM(s) IV Intermittent every 8 hours  ondansetron Injectable 4 milliGRAM(s) IV Push every 6 hours PRN  PHENobarbital 64.8 milliGRAM(s) Oral daily  polyethylene glycol 3350 17 Gram(s) Oral every 12 hours  senna 2 Tablet(s) Oral at bedtime  tamsulosin 0.4 milliGRAM(s) Oral at bedtime      ANTIBIOTICS:  meropenem  IVPB      meropenem  IVPB 1000 milliGRAM(s) IV Intermittent every 8 hours

## 2020-06-05 NOTE — PROGRESS NOTE ADULT - ASSESSMENT
Patient is a 65 y/o male with PMH of cerebral palsy, seizure disorder, spastic paraplegia, s/p PEG tube, Asthma, H/O nephrolithiasis, BPH with bladder neck stricture s/p suprapubic catheter, and H/O Pseudomonas and ESBL Proteus mirabilis bacteruria who presented from a group with abdominal pain x1 days.         IMPRESSION   COVID 19 +ve PCR on 5/27 resolved  but remains covid  positive   Urosepsis (resolving) 2/2 pyelonephritis with hydronephrosis and ureter stones with proteus ESBL bacteruria and bacteremia   BPH with bladder neck stricture s/p suprapubic catheter  Acute normocytic anemia- stable   Lactic acidosis: (resolved)   Functional quadriplegia   Cerebral palsy        PLAN   #COVID 19 +ve PCR on 5/27 resolved  but remains covid  positive   - COVID neg on 5/29 then pos 5/30 -  RESWABBED 6/2 positive  - d/c to group home on 06/08    #urosepsis (resolving) 2/2 pyelonephritis with hydronephrosis and ureter stones with proteus ESBL bacteruria and bacteremia   - s/p R PCT nephrostomy with stent on 5/20, and Percutaneous nephrolithotomy ([facility]) 5/26,   - no further intervention by urology  - Pain control with Toradol PRN, monitor renal function   - c/w Tamsulosin and Bentyl to facilitate passage   - c/w Meropenem (proteus ESBL bacteruria and bacteremia), repeat Bcx negative on 5/22,on discharge Ertapenem 1 gram QD to complete 14 days ends on 6/8/2020  - monitor UO through right nephrostomy drainage, once zero for the next 24 hours, can be removed  - suprapubic catheter change Q4 weeks, due to change in 1 week       #lactic acidosis: (resolved) possibly secondary to nephrostomy now resolved   # Acute normocytic anemia: Hbg stable, likely dilutional, no signs of blood loss, monitor Hbg and keep active type and screening   # Chronic Cough is unlikely from  bronchitis   # Seizure disorder - Continue with phenobarbital 64.8mg PO through PEG tube QD no REEG negative   # Cerebral palsy w/ Spastic quadriplegia - Continue with baclofen 10mg PO three times a day, c/w PEG feeds   # Functional quadriplegia - full care  # Asthma - No wheezing on exam, on RA, Albuterol PRN   # BPH with bladder neck stricture s/p suprapubic catheter - Tube replaced day before admission, Chronic colonization w/ ESBL Proteus Mirabilis and Pseudomonas on last admission,    DVT Prophylaxis: Lovenox 40mg SQ once daily,   GI Prophylaxis: Not indicated   Diet: through PEG tube feeding  Activity: bed rest   Dispo: [facility], , Leslie ([phone]) is the sister and health care proxy, Mitch is the nurse at [facility] [phone],   Code Status: Full Code  Dispo: from [facility], [place] after last dose of Abx on 06/08

## 2020-06-05 NOTE — PROGRESS NOTE ADULT - SUBJECTIVE AND OBJECTIVE BOX
LENGTH OF HOSPITAL STAY: 17d      CHIEF COMPLAINT:   Patient is a 64y old  Male who presents with a chief complaint of Abd pain and fever (01 Jun 2020 17:05)      OVER Past 24hrs:  The patient was seen and examined at bedside there were no acute events during the night.       PAST MEDICAL & SURGICAL HISTORY  PAST MEDICAL & SURGICAL HISTORY:  Asthma  Urinary retention  Urinary calculi  Spastic quadriplegia  Osteoporosis  Seizure: last seizure &gt;10 years ago  Cerebral palsy  BPH (benign prostatic hyperplasia)  Suprapubic catheter  H/O cystoscopy  S/P percutaneous endoscopic gastrostomy (PEG) tube placement        REVIEW OF SYSTEMS  denies pain    ALLERGIES:  No Known Allergies    MEDICATIONS:  STANDING MEDICATIONS  baclofen 10 milliGRAM(s) Oral three times a day  calcium carbonate   1250 mG (OsCal) 1 Tablet(s) Oral two times a day  calcium carbonate   1250 mG (OsCal) 1 Tablet(s) Oral daily  dicyclomine 10 milliGRAM(s) Oral daily  enoxaparin Injectable 40 milliGRAM(s) SubCutaneous daily  meropenem  IVPB      meropenem  IVPB 1000 milliGRAM(s) IV Intermittent every 8 hours  PHENobarbital 64.8 milliGRAM(s) Oral daily  polyethylene glycol 3350 17 Gram(s) Oral every 12 hours  senna 2 Tablet(s) Oral at bedtime  tamsulosin 0.4 milliGRAM(s) Oral at bedtime    PRN MEDICATIONS  acetaminophen   Tablet .. 650 milliGRAM(s) Oral every 6 hours PRN  ALBUTerol    90 MICROgram(s) HFA Inhaler 2 Puff(s) Inhalation every 6 hours PRN  benzonatate 100 milliGRAM(s) Oral every 8 hours PRN  ondansetron Injectable 4 milliGRAM(s) IV Push every 6 hours PRN    VITALS:   T(F): 97  HR: 65  BP: 97/61  RR: 18  SpO2: 91%    PHYSICAL EXAM:  General: No acute distress.   interactive,     HEENT: Pupils equal, reactive to light symmetrically.    PULM: Clear to auscultation bilaterally decreased breath sounds in lower lobes, no significant sputum production.    CVS: Regular rate and rhythm, no murmurs, rubs, or gallops.    GI: Soft, nondistended, nontender, no masses. PEG noted no drainage     URINARY: Suprapubic Cath  noted     MSK: No edema, nontender. Quadriplegic     SKIN: Warm and well perfused, no rashes noted.    PSYCH: at baseline     LABS:                        12.3   3.43  )-----------( 338      ( 04 Jun 2020 06:35 )             36.6     06-04    148<H>  |  106  |  24<H>  ----------------------------<  82  4.3   |  30  |  0.6<L>    Ca    9.6      04 Jun 2020 06:35  Phos  3.2     06-04  Mg     2.3     06-04

## 2020-06-06 LAB
ANION GAP SERPL CALC-SCNC: 9 MMOL/L — SIGNIFICANT CHANGE UP (ref 7–14)
BASOPHILS # BLD AUTO: 0.05 K/UL — SIGNIFICANT CHANGE UP (ref 0–0.2)
BASOPHILS NFR BLD AUTO: 1.4 % — HIGH (ref 0–1)
BUN SERPL-MCNC: 25 MG/DL — HIGH (ref 10–20)
CALCIUM SERPL-MCNC: 9.1 MG/DL — SIGNIFICANT CHANGE UP (ref 8.5–10.1)
CHLORIDE SERPL-SCNC: 105 MMOL/L — SIGNIFICANT CHANGE UP (ref 98–110)
CO2 SERPL-SCNC: 27 MMOL/L — SIGNIFICANT CHANGE UP (ref 17–32)
CREAT SERPL-MCNC: 0.5 MG/DL — LOW (ref 0.7–1.5)
EOSINOPHIL # BLD AUTO: 0.2 K/UL — SIGNIFICANT CHANGE UP (ref 0–0.7)
EOSINOPHIL NFR BLD AUTO: 5.5 % — SIGNIFICANT CHANGE UP (ref 0–8)
GLUCOSE SERPL-MCNC: 129 MG/DL — HIGH (ref 70–99)
HCT VFR BLD CALC: 36 % — LOW (ref 42–52)
HGB BLD-MCNC: 12.2 G/DL — LOW (ref 14–18)
IMM GRANULOCYTES NFR BLD AUTO: 0.3 % — SIGNIFICANT CHANGE UP (ref 0.1–0.3)
LYMPHOCYTES # BLD AUTO: 1.19 K/UL — LOW (ref 1.2–3.4)
LYMPHOCYTES # BLD AUTO: 32.6 % — SIGNIFICANT CHANGE UP (ref 20.5–51.1)
MAGNESIUM SERPL-MCNC: 2.2 MG/DL — SIGNIFICANT CHANGE UP (ref 1.8–2.4)
MCHC RBC-ENTMCNC: 32.8 PG — HIGH (ref 27–31)
MCHC RBC-ENTMCNC: 33.9 G/DL — SIGNIFICANT CHANGE UP (ref 32–37)
MCV RBC AUTO: 96.8 FL — HIGH (ref 80–94)
MONOCYTES # BLD AUTO: 0.35 K/UL — SIGNIFICANT CHANGE UP (ref 0.1–0.6)
MONOCYTES NFR BLD AUTO: 9.6 % — HIGH (ref 1.7–9.3)
NEUTROPHILS # BLD AUTO: 1.85 K/UL — SIGNIFICANT CHANGE UP (ref 1.4–6.5)
NEUTROPHILS NFR BLD AUTO: 50.6 % — SIGNIFICANT CHANGE UP (ref 42.2–75.2)
NRBC # BLD: 0 /100 WBCS — SIGNIFICANT CHANGE UP (ref 0–0)
PHOSPHATE SERPL-MCNC: 2.8 MG/DL — SIGNIFICANT CHANGE UP (ref 2.1–4.9)
PLATELET # BLD AUTO: 303 K/UL — SIGNIFICANT CHANGE UP (ref 130–400)
POTASSIUM SERPL-MCNC: 4.4 MMOL/L — SIGNIFICANT CHANGE UP (ref 3.5–5)
POTASSIUM SERPL-SCNC: 4.4 MMOL/L — SIGNIFICANT CHANGE UP (ref 3.5–5)
RBC # BLD: 3.72 M/UL — LOW (ref 4.7–6.1)
RBC # FLD: 14.5 % — SIGNIFICANT CHANGE UP (ref 11.5–14.5)
SODIUM SERPL-SCNC: 141 MMOL/L — SIGNIFICANT CHANGE UP (ref 135–146)
WBC # BLD: 3.65 K/UL — LOW (ref 4.8–10.8)
WBC # FLD AUTO: 3.65 K/UL — LOW (ref 4.8–10.8)

## 2020-06-06 PROCEDURE — 99232 SBSQ HOSP IP/OBS MODERATE 35: CPT | Mod: CS

## 2020-06-06 RX ADMIN — ENOXAPARIN SODIUM 40 MILLIGRAM(S): 100 INJECTION SUBCUTANEOUS at 11:08

## 2020-06-06 RX ADMIN — POLYETHYLENE GLYCOL 3350 17 GRAM(S): 17 POWDER, FOR SOLUTION ORAL at 05:21

## 2020-06-06 RX ADMIN — Medication 10 MILLIGRAM(S): at 11:07

## 2020-06-06 RX ADMIN — Medication 1 TABLET(S): at 17:04

## 2020-06-06 RX ADMIN — Medication 10 MILLIGRAM(S): at 05:21

## 2020-06-06 RX ADMIN — Medication 10 MILLIGRAM(S): at 13:21

## 2020-06-06 RX ADMIN — Medication 1 TABLET(S): at 11:07

## 2020-06-06 RX ADMIN — Medication 64.8 MILLIGRAM(S): at 11:07

## 2020-06-06 RX ADMIN — Medication 1 TABLET(S): at 05:21

## 2020-06-06 RX ADMIN — Medication 10 MILLIGRAM(S): at 21:34

## 2020-06-06 RX ADMIN — TAMSULOSIN HYDROCHLORIDE 0.4 MILLIGRAM(S): 0.4 CAPSULE ORAL at 21:34

## 2020-06-06 NOTE — PROGRESS NOTE ADULT - SUBJECTIVE AND OBJECTIVE BOX
LENGTH OF HOSPITAL STAY: 18d      CHIEF COMPLAINT:   Patient is a 64y old  Male who presents with a chief complaint of Abd pain and fever (01 Jun 2020 17:05)      OVER Past 24hrs:  The patient was seen and examined at bedside there were no acute events during the night.        PAST MEDICAL & SURGICAL HISTORY  PAST MEDICAL & SURGICAL HISTORY:  Asthma  Urinary retention  Urinary calculi  Spastic quadriplegia  Osteoporosis  Seizure: last seizure &gt;10 years ago  Cerebral palsy  BPH (benign prostatic hyperplasia)  Suprapubic catheter  H/O cystoscopy  S/P percutaneous endoscopic gastrostomy (PEG) tube placement        REVIEW OF SYSTEMS  denies pain  or sob    ALLERGIES:  No Known Allergies    MEDICATIONS:  STANDING MEDICATIONS  baclofen 10 milliGRAM(s) Oral three times a day  calcium carbonate   1250 mG (OsCal) 1 Tablet(s) Oral two times a day  calcium carbonate   1250 mG (OsCal) 1 Tablet(s) Oral daily  dicyclomine 10 milliGRAM(s) Oral daily  enoxaparin Injectable 40 milliGRAM(s) SubCutaneous daily  PHENobarbital 64.8 milliGRAM(s) Oral daily  polyethylene glycol 3350 17 Gram(s) Oral every 12 hours  senna 2 Tablet(s) Oral at bedtime  tamsulosin 0.4 milliGRAM(s) Oral at bedtime    PRN MEDICATIONS  acetaminophen   Tablet .. 650 milliGRAM(s) Oral every 6 hours PRN  ALBUTerol    90 MICROgram(s) HFA Inhaler 2 Puff(s) Inhalation every 6 hours PRN  benzonatate 100 milliGRAM(s) Oral every 8 hours PRN  ondansetron Injectable 4 milliGRAM(s) IV Push every 6 hours PRN    VITALS:   T(F): 96.9  HR: 63  BP: 107/64  RR: 18  SpO2: 94%    PHYSICAL EXAM:  General: No acute distress.   interactive,     HEENT: Pupils equal, reactive to light symmetrically.    PULM: Clear to auscultation bilaterally decreased breath sounds in lower lobes, no significant sputum production.    CVS: Regular rate and rhythm, no murmurs, rubs, or gallops.    GI: Soft, nondistended, nontender, no masses. PEG noted no drainage     URINARY: Suprapubic Cath  noted     MSK: No edema, nontender. Spastic  Quadriplegic     SKIN: Warm and well perfused, no rashes noted.    PSYCH: at baseline

## 2020-06-06 NOTE — PROGRESS NOTE ADULT - ASSESSMENT
Patient is a 63 y/o male with PMH of cerebral palsy, seizure disorder, spastic paraplegia, s/p PEG tube, Asthma, H/O nephrolithiasis, BPH with bladder neck stricture s/p suprapubic catheter, and H/O Pseudomonas and ESBL Proteus mirabilis bacteruria who presented from a group with abdominal pain x1 days.         IMPRESSION   COVID 19 +ve PCR on 5/27 resolved  but remains covid  positive   Urosepsis (resolving) 2/2 pyelonephritis with hydronephrosis and ureter stones with proteus ESBL bacteruria and bacteremia   BPH with bladder neck stricture s/p suprapubic catheter  Acute normocytic anemia- stable   Lactic acidosis: (resolved)   Functional quadriplegia   Cerebral palsy        PLAN   #COVID 19 +ve PCR on 5/27 resolved  but remains covid  positive   - COVID neg on 5/29 then pos 5/30 -  RESWABBED 6/2 positive  - d/c to group home on 06/08    #urosepsis (resolving) 2/2 pyelonephritis with hydronephrosis and ureter stones with proteus ESBL bacteruria and bacteremia   - s/p R PCT nephrostomy with stent on 5/20, and Percutaneous nephrolithotomy (PCNL) 5/26,   - no further intervention by urology  - Pain control with Toradol PRN, monitor renal function   - c/w Tamsulosin and Bentyl to facilitate passage   - c/w Meropenem (proteus ESBL bacteruria and bacteremia), repeat Bcx negative on 5/22,on discharge Ertapenem 1 gram QD to complete 14 days ends on 6/8/2020  - monitor UO through right nephrostomy drainage, once zero for the next 24 hours, can be removed  - suprapubic catheter change Q4 weeks, due to change next week       #lactic acidosis: (resolved)   # Acute normocytic anemia: Hbg stable, likely dilutional, no signs of blood loss, monitor Hbg and keep active type and screening   # Chronic Cough is unlikely from  bronchitis   # Seizure disorder - Continue with phenobarbital 64.8mg PO through PEG tube QD no REEG negative   # Cerebral palsy w/ Spastic quadriplegia - Continue with baclofen 10mg PO three times a day, c/w PEG feeds   # Functional quadriplegia - full care  # Asthma - No wheezing on exam, on RA, Albuterol PRN   # BPH with bladder neck stricture s/p suprapubic catheter - Tube replaced day before admission, Chronic colonization w/ ESBL Proteus Mirabilis and Pseudomonas on last admission,    DVT Prophylaxis: Lovenox 40mg SQ once daily,   GI Prophylaxis: Not indicated   Diet: through PEG tube feeding  Activity: bed rest   Dispo: [facility], , Leslie ([phone]) is the sister and health care proxy, Mitch is the nurse at [facility] [phone],   Code Status: Full Code  Dispo: from [facility], DC after last dose of Abx on 06/08

## 2020-06-07 PROCEDURE — 99232 SBSQ HOSP IP/OBS MODERATE 35: CPT | Mod: CS

## 2020-06-07 RX ADMIN — Medication 64.8 MILLIGRAM(S): at 11:46

## 2020-06-07 RX ADMIN — Medication 10 MILLIGRAM(S): at 13:01

## 2020-06-07 RX ADMIN — Medication 10 MILLIGRAM(S): at 05:12

## 2020-06-07 RX ADMIN — TAMSULOSIN HYDROCHLORIDE 0.4 MILLIGRAM(S): 0.4 CAPSULE ORAL at 21:35

## 2020-06-07 RX ADMIN — Medication 1 TABLET(S): at 17:10

## 2020-06-07 RX ADMIN — ENOXAPARIN SODIUM 40 MILLIGRAM(S): 100 INJECTION SUBCUTANEOUS at 11:10

## 2020-06-07 RX ADMIN — Medication 10 MILLIGRAM(S): at 11:10

## 2020-06-07 RX ADMIN — SENNA PLUS 2 TABLET(S): 8.6 TABLET ORAL at 21:35

## 2020-06-07 RX ADMIN — Medication 1 TABLET(S): at 05:12

## 2020-06-07 RX ADMIN — Medication 1 TABLET(S): at 11:10

## 2020-06-07 RX ADMIN — Medication 10 MILLIGRAM(S): at 21:35

## 2020-06-07 NOTE — PROGRESS NOTE ADULT - SUBJECTIVE AND OBJECTIVE BOX
S: No AEON  No new complaints      All other pertinent ROS negative.      06-06-20 @ 07:01  -  06-07-20 @ 07:00  --------------------------------------------------------  IN: 2200 mL / OUT: 450 mL / NET: 1750 mL    06-07-20 @ 07:01  -  06-07-20 @ 18:26  --------------------------------------------------------  IN: 1100 mL / OUT: 400 mL / NET: 700 mL      Vital Signs Last 24 Hrs  T(C): 36.9 (07 Jun 2020 17:00), Max: 36.9 (07 Jun 2020 17:00)  T(F): 98.4 (07 Jun 2020 17:00), Max: 98.4 (07 Jun 2020 17:00)  HR: 69 (07 Jun 2020 17:00) (63 - 71)  BP: 99/55 (07 Jun 2020 17:00) (80/52 - 114/64)  BP(mean): --  RR: 18 (07 Jun 2020 17:00) (17 - 18)  SpO2: 96% (07 Jun 2020 17:00) (93% - 96%)  PHYSICAL EXAM:    no change from prior    MEDICATIONS:  MEDICATIONS  (STANDING):  baclofen 10 milliGRAM(s) Oral three times a day  calcium carbonate   1250 mG (OsCal) 1 Tablet(s) Oral two times a day  calcium carbonate   1250 mG (OsCal) 1 Tablet(s) Oral daily  dicyclomine 10 milliGRAM(s) Oral daily  enoxaparin Injectable 40 milliGRAM(s) SubCutaneous daily  PHENobarbital 64.8 milliGRAM(s) Oral daily  polyethylene glycol 3350 17 Gram(s) Oral every 12 hours  senna 2 Tablet(s) Oral at bedtime  tamsulosin 0.4 milliGRAM(s) Oral at bedtime      LABS: All Labs Reviewed:                        12.2   3.65  )-----------( 303      ( 06 Jun 2020 11:25 )             36.0     06-06    141  |  105  |  25<H>  ----------------------------<  129<H>  4.4   |  27  |  0.5<L>    Ca    9.1      06 Jun 2020 11:25  Phos  2.8     06-06  Mg     2.2     06-06            Blood Culture:     Radiology: reviewed

## 2020-06-07 NOTE — PROGRESS NOTE ADULT - ASSESSMENT
65 y/o male with PMH of cerebral palsy, seizure disorder, spastic paraplegia, s/p PEG tube, Asthma, H/O nephrolithiasis, BPH with bladder neck stricture s/p suprapubic catheter, and H/O Pseudomonas and ESBL Proteus mirabilis bacteruria who presented from a group with abdominal pain x1 days.     PLAN   #COVID 19+ve PCR on 5/27 resolved but remains covid  positive   - COVID neg on 5/29 then pos 5/30, positive on 6/2. Will need to reswab before discharge  - remains stable on RA (O2 sat 94%)  - d/c to group home once neg swab    #urosepsis (resolving) 2/2 pyelonephritis with hydronephrosis and ureter stones with proteus ESBL bacteruria and bacteremia   - s/p R PCT nephrostomy with stent on 5/20, and Percutaneous nephrolithotomy (PCNL) 5/26,   - no further intervention by urology  - Pain control, monitor renal function   - c/w Tamsulosin and Bentyl to facilitate passage   - c/w Meropenem (proteus ESBL bacteruria and bacteremia), repeat Bcx negative on 5/22, to complete 14 days ends on 6/8/2020  - monitor UO through right nephrostomy drainage, once zero for the next 24 hours, can be removed  - suprapubic catherter change Q4 weeks, due to change in 2 weeks     #lactic acidosis: (resolved) possibly secondary to nephrostomy now resolved   # Acute normocytic anemia: Hbg stable, likely dilutional, no signs of blood loss, monitor Hbg and keep active type and screening   # Chronic Cough is unlikely from bronchitis   # Seizure disorder - continue with phenobarbital 64.8mg PO through PEG tube QD, no REEG negative   # Cerebral palsy w/ Spastic quadriplegia - Continue with baclofen 10mg PO three times a day, c/w PEG feeds   # Functional quadriplegia - full care  # Asthma - No wheezing on exam, on RA, Albuterol PRN   # BPH with bladder neck stricture s/p suprapubic catheter - Tube replaced day before admission, Chronic colonization w/ ESBL Proteus Mirabilis and Pseudomonas on last admission,    DVT Prophylaxis: Lovenox 40mg SQ once daily  GI Prophylaxis: Not indicated   Diet: through PEG tube feeding  Activity: bed rest   Dispo: [facility] ([phone]) is the sister and health care proxy, Mitch is the nurse at [facility] [phone],   Code Status: Full Code  Dispo: from [facility], medically stable d/c pending neg swab for COVID . complete abx here 6/8 and discharge. reswabbed 6/7.

## 2020-06-08 LAB — SARS-COV-2 RNA SPEC QL NAA+PROBE: DETECTED

## 2020-06-08 PROCEDURE — 71045 X-RAY EXAM CHEST 1 VIEW: CPT | Mod: 26

## 2020-06-08 PROCEDURE — 99232 SBSQ HOSP IP/OBS MODERATE 35: CPT | Mod: CS

## 2020-06-08 RX ADMIN — TAMSULOSIN HYDROCHLORIDE 0.4 MILLIGRAM(S): 0.4 CAPSULE ORAL at 22:05

## 2020-06-08 RX ADMIN — Medication 10 MILLIGRAM(S): at 22:04

## 2020-06-08 RX ADMIN — Medication 10 MILLIGRAM(S): at 06:40

## 2020-06-08 RX ADMIN — Medication 10 MILLIGRAM(S): at 11:34

## 2020-06-08 RX ADMIN — SENNA PLUS 2 TABLET(S): 8.6 TABLET ORAL at 22:04

## 2020-06-08 RX ADMIN — Medication 1 TABLET(S): at 06:40

## 2020-06-08 RX ADMIN — Medication 64.8 MILLIGRAM(S): at 11:35

## 2020-06-08 RX ADMIN — Medication 1 TABLET(S): at 17:10

## 2020-06-08 RX ADMIN — ENOXAPARIN SODIUM 40 MILLIGRAM(S): 100 INJECTION SUBCUTANEOUS at 11:35

## 2020-06-08 NOTE — PROGRESS NOTE ADULT - ASSESSMENT
Patient is a 63 y/o male with PMH of cerebral palsy, seizure disorder, spastic paraplegia, s/p PEG tube, Asthma, H/O nephrolithiasis, BPH with bladder neck stricture s/p suprapubic catheter, and H/O Pseudomonas and ESBL Proteus mirabilis bacteruria who presented from a group with abdominal pain x1 days.         IMPRESSION   COVID 19 +ve PCR on 5/27 resolved  but remains covid  positive   Urosepsis (resolving) 2/2 pyelonephritis with hydronephrosis and ureter stones with proteus ESBL bacteruria and bacteremia   BPH with bladder neck stricture s/p suprapubic catheter  Acute normocytic anemia- stable   Lactic acidosis: (resolved)   Functional quadriplegia   Cerebral palsy        PLAN   #COVID 19 +ve PCR on 5/27 resolved  but remains covid  positive   - COVID neg on 5/29 then pos 5/30 -  6/2 positive, Positive 06/07   - d/c to group home on 06/08    #urosepsis (resolving) 2/2 pyelonephritis with hydronephrosis and ureter stones with proteus ESBL bacteruria and bacteremia   - s/p R PCT nephrostomy with stent on 5/20, and Percutaneous nephrolithotomy (PCNL) 5/26,   - no further intervention by urology  - Pain control with Toradol PRN, monitor renal function   - c/w Tamsulosin and Bentyl to facilitate passage   - c/w Meropenem (proteus ESBL bacteruria and bacteremia), repeat Bcx negative on 5/22,on discharge Ertapenem 1 gram QD to complete 14 days ends on 6/8/2020  - monitor UO through right nephrostomy drainage, once zero for the next 24 hours, can be removed  - suprapubic catheter change Q4 weeks, due to change this week mild puss noted around  insertion site       #lactic acidosis: (resolved)   # Acute normocytic anemia: Hbg stable, likely dilutional, no signs of blood loss, monitor Hbg and keep active type and screening   # Chronic Cough is unlikely from  bronchitis   # Seizure disorder - Continue with phenobarbital 64.8mg PO through PEG tube QD no REEG negative   # Cerebral palsy w/ Spastic quadriplegia - Continue with baclofen 10mg PO three times a day, c/w PEG feeds   # Functional quadriplegia - full care  # Asthma - No wheezing on exam, on RA, Albuterol PRN   # BPH with bladder neck stricture s/p suprapubic catheter - Tube replaced day before admission, Chronic colonization w/ ESBL Proteus Mirabilis and Pseudomonas on last admission,    DVT Prophylaxis: Lovenox 40mg SQ once daily,   GI Prophylaxis: Not indicated   Diet: through PEG tube feeding  Activity: bed rest   Dispo: assisted, , Leslie ([phone]) is the sister and health care proxy, Mitch is the nurse at [facility] [phone],   Code Status: Full Code  Dispo: from group home, Last dose of Abx on 06/08 Patient remains positive Patient is a 65 y/o male with PMH of cerebral palsy, seizure disorder, spastic paraplegia, s/p PEG tube, Asthma, H/O nephrolithiasis, BPH with bladder neck stricture s/p suprapubic catheter, and H/O Pseudomonas and ESBL Proteus mirabilis bacteruria who presented from a group with abdominal pain x1 days.         IMPRESSION   COVID 19 +ve PCR on 5/27 resolved  but remains covid  positive   Urosepsis (resolving) 2/2 pyelonephritis with hydronephrosis and ureter stones with proteus ESBL bacteruria and bacteremia   BPH with bladder neck stricture s/p suprapubic catheter  Acute normocytic anemia- stable   Lactic acidosis: (resolved)   Functional quadriplegia   Cerebral palsy        PLAN   #COVID 19 +ve PCR on 5/27 resolved  but remains covid  positive   - COVID neg on 5/29 then pos 5/30 -  6/2 positive, Positive 06/07   - d/c to group home on 06/08    #urosepsis (resolving) 2/2 pyelonephritis with hydronephrosis and ureter stones with proteus ESBL bacteruria and bacteremia   - s/p R PCT nephrostomy with stent on 5/20, and Percutaneous nephrolithotomy (PCNL) 5/26,   - no further intervention by urology  - Pain control with Toradol PRN, monitor renal function   - c/w Tamsulosin and Bentyl to facilitate passage   - c/w Meropenem (proteus ESBL bacteruria and bacteremia), repeat Bcx negative on 5/22,on discharge Ertapenem 1 gram QD to complete 14 days ends on 6/8/2020  - monitor UO through right nephrostomy drainage, once zero for the next 24 hours, can be removed  - suprapubic catheter change Q4 weeks, due for change  after speaking with urology 06/19 if  discharging prior please call urology for stat change prior to DC        #lactic acidosis: (resolved)   # Acute normocytic anemia: Hbg stable, likely dilutional, no signs of blood loss, monitor Hbg and keep active type and screening   # Chronic Cough is unlikely from  bronchitis   # Seizure disorder - Continue with phenobarbital 64.8mg PO through PEG tube QD no REEG negative   # Cerebral palsy w/ Spastic quadriplegia - Continue with baclofen 10mg PO three times a day, c/w PEG feeds   # Functional quadriplegia - full care  # Asthma - No wheezing on exam, on RA, Albuterol PRN   # BPH with bladder neck stricture s/p suprapubic catheter - Tube replaced day before admission, Chronic colonization w/ ESBL Proteus Mirabilis and Pseudomonas on last admission,    DVT Prophylaxis: Lovenox 40mg SQ once daily,   GI Prophylaxis: Not indicated   Diet: through PEG tube feeding  Activity: bed rest   Dispo: shelter, , Leslie ([phone]) is the sister and health care proxy, Mitch is the nurse at [phone],   Code Status: Full Code  Dispo: from group [place], Last dose of Abx on 06/08 Patient remains positive

## 2020-06-08 NOTE — PROGRESS NOTE ADULT - SUBJECTIVE AND OBJECTIVE BOX
LENGTH OF HOSPITAL STAY: 20d      CHIEF COMPLAINT:   Patient is a 64y old  Male who presents with a chief complaint of Abd pain and fever (01 Jun 2020 17:05)      OVER Past 24hrs:  The patient was seen and examined at bedside there were no acute events  during the night.         PAST MEDICAL & SURGICAL HISTORY  PAST MEDICAL & SURGICAL HISTORY:  Asthma  Urinary retention  Urinary calculi  Spastic quadriplegia  Osteoporosis  Seizure: last seizure &gt;10 years ago  Cerebral palsy  BPH (benign prostatic hyperplasia)  Suprapubic catheter  H/O cystoscopy  S/P percutaneous endoscopic gastrostomy (PEG) tube placement        REVIEW OF SYSTEMS  denies pain    ALLERGIES:  No Known Allergies    MEDICATIONS:  STANDING MEDICATIONS  baclofen 10 milliGRAM(s) Oral three times a day  calcium carbonate   1250 mG (OsCal) 1 Tablet(s) Oral two times a day  calcium carbonate   1250 mG (OsCal) 1 Tablet(s) Oral daily  dicyclomine 10 milliGRAM(s) Oral daily  enoxaparin Injectable 40 milliGRAM(s) SubCutaneous daily  PHENobarbital 64.8 milliGRAM(s) Oral daily  polyethylene glycol 3350 17 Gram(s) Oral every 12 hours  senna 2 Tablet(s) Oral at bedtime  tamsulosin 0.4 milliGRAM(s) Oral at bedtime    PRN MEDICATIONS  acetaminophen   Tablet .. 650 milliGRAM(s) Oral every 6 hours PRN  ALBUTerol    90 MICROgram(s) HFA Inhaler 2 Puff(s) Inhalation every 6 hours PRN  benzonatate 100 milliGRAM(s) Oral every 8 hours PRN  ondansetron Injectable 4 milliGRAM(s) IV Push every 6 hours PRN    VITALS:   T(F): 97.1  HR: 83  BP: 118/86  RR: 18  SpO2: 96%    PHYSICAL EXAM:  General: No acute distress.   interactive,     HEENT: Pupils equal, reactive to light symmetrically.    PULM: Clear to auscultation bilaterally decreased breath sounds in lower lobes, no significant sputum production.    CVS: Regular rate and rhythm, no murmurs, rubs, or gallops.    GI: Soft, nondistended, nontender, no masses. PEG noted no drainage     URINARY: Suprapubic Cath  noted  mild puss around site     MSK: No edema, nontender. Spastic  Quadriplegic     SKIN: Warm and well perfused, no rashes noted.    PSYCH: at baseline       LABS:                        12.2   3.65  )-----------( 303      ( 06 Jun 2020 11:25 )             36.0     06-06    141  |  105  |  25<H>  ----------------------------<  129<H>  4.4   |  27  |  0.5<L>    Ca    9.1      06 Jun 2020 11:25

## 2020-06-08 NOTE — CHART NOTE - NSCHARTNOTEFT_GEN_A_CORE
RD Limited Follow Up note:    Diet order: Jevity 1.2 @ 300 ml q6hr via PEG (1440kcal, 67 gm protein, 972ml free water) - Per RN, pt is tolerating feeds well. No issues. LBM 6/7. Redness blanchable to sacrum. Labs and meds reviewed. COVID+. Per MD, last dose abx today. Pt is meeting estimated nutrition needs. Pt not at risk f/u 7 days

## 2020-06-09 LAB
ALBUMIN SERPL ELPH-MCNC: 3.6 G/DL — SIGNIFICANT CHANGE UP (ref 3.5–5.2)
ALP SERPL-CCNC: 99 U/L — SIGNIFICANT CHANGE UP (ref 30–115)
ALT FLD-CCNC: 29 U/L — SIGNIFICANT CHANGE UP (ref 0–41)
ANION GAP SERPL CALC-SCNC: 14 MMOL/L — SIGNIFICANT CHANGE UP (ref 7–14)
AST SERPL-CCNC: 32 U/L — SIGNIFICANT CHANGE UP (ref 0–41)
BASOPHILS # BLD AUTO: 0.04 K/UL — SIGNIFICANT CHANGE UP (ref 0–0.2)
BASOPHILS NFR BLD AUTO: 1 % — SIGNIFICANT CHANGE UP (ref 0–1)
BILIRUB SERPL-MCNC: 0.4 MG/DL — SIGNIFICANT CHANGE UP (ref 0.2–1.2)
BUN SERPL-MCNC: 20 MG/DL — SIGNIFICANT CHANGE UP (ref 10–20)
CALCIUM SERPL-MCNC: 9.1 MG/DL — SIGNIFICANT CHANGE UP (ref 8.5–10.1)
CHLORIDE SERPL-SCNC: 104 MMOL/L — SIGNIFICANT CHANGE UP (ref 98–110)
CO2 SERPL-SCNC: 22 MMOL/L — SIGNIFICANT CHANGE UP (ref 17–32)
CREAT SERPL-MCNC: 0.6 MG/DL — LOW (ref 0.7–1.5)
EOSINOPHIL # BLD AUTO: 0.18 K/UL — SIGNIFICANT CHANGE UP (ref 0–0.7)
EOSINOPHIL NFR BLD AUTO: 4.7 % — SIGNIFICANT CHANGE UP (ref 0–8)
GLUCOSE SERPL-MCNC: 130 MG/DL — HIGH (ref 70–99)
HCT VFR BLD CALC: 38.6 % — LOW (ref 42–52)
HGB BLD-MCNC: 12.7 G/DL — LOW (ref 14–18)
IMM GRANULOCYTES NFR BLD AUTO: 0.5 % — HIGH (ref 0.1–0.3)
LYMPHOCYTES # BLD AUTO: 1.18 K/UL — LOW (ref 1.2–3.4)
LYMPHOCYTES # BLD AUTO: 30.6 % — SIGNIFICANT CHANGE UP (ref 20.5–51.1)
MAGNESIUM SERPL-MCNC: 2 MG/DL — SIGNIFICANT CHANGE UP (ref 1.8–2.4)
MCHC RBC-ENTMCNC: 31.7 PG — HIGH (ref 27–31)
MCHC RBC-ENTMCNC: 32.9 G/DL — SIGNIFICANT CHANGE UP (ref 32–37)
MCV RBC AUTO: 96.3 FL — HIGH (ref 80–94)
MONOCYTES # BLD AUTO: 0.35 K/UL — SIGNIFICANT CHANGE UP (ref 0.1–0.6)
MONOCYTES NFR BLD AUTO: 9.1 % — SIGNIFICANT CHANGE UP (ref 1.7–9.3)
NEUTROPHILS # BLD AUTO: 2.08 K/UL — SIGNIFICANT CHANGE UP (ref 1.4–6.5)
NEUTROPHILS NFR BLD AUTO: 54.1 % — SIGNIFICANT CHANGE UP (ref 42.2–75.2)
NRBC # BLD: 0 /100 WBCS — SIGNIFICANT CHANGE UP (ref 0–0)
PHOSPHATE SERPL-MCNC: 3.8 MG/DL — SIGNIFICANT CHANGE UP (ref 2.1–4.9)
PLATELET # BLD AUTO: 239 K/UL — SIGNIFICANT CHANGE UP (ref 130–400)
POTASSIUM SERPL-MCNC: 4.6 MMOL/L — SIGNIFICANT CHANGE UP (ref 3.5–5)
POTASSIUM SERPL-SCNC: 4.6 MMOL/L — SIGNIFICANT CHANGE UP (ref 3.5–5)
PROT SERPL-MCNC: 6.9 G/DL — SIGNIFICANT CHANGE UP (ref 6–8)
RBC # BLD: 4.01 M/UL — LOW (ref 4.7–6.1)
RBC # FLD: 14.3 % — SIGNIFICANT CHANGE UP (ref 11.5–14.5)
SODIUM SERPL-SCNC: 140 MMOL/L — SIGNIFICANT CHANGE UP (ref 135–146)
WBC # BLD: 3.85 K/UL — LOW (ref 4.8–10.8)
WBC # FLD AUTO: 3.85 K/UL — LOW (ref 4.8–10.8)

## 2020-06-09 PROCEDURE — 99233 SBSQ HOSP IP/OBS HIGH 50: CPT | Mod: CS

## 2020-06-09 PROCEDURE — 51705 CHANGE OF BLADDER TUBE: CPT | Mod: 79

## 2020-06-09 PROCEDURE — 71045 X-RAY EXAM CHEST 1 VIEW: CPT | Mod: 26

## 2020-06-09 RX ORDER — SODIUM CHLORIDE 9 MG/ML
500 INJECTION INTRAMUSCULAR; INTRAVENOUS; SUBCUTANEOUS
Refills: 0 | Status: COMPLETED | OUTPATIENT
Start: 2020-06-09 | End: 2020-06-09

## 2020-06-09 RX ADMIN — Medication 10 MILLIGRAM(S): at 06:07

## 2020-06-09 RX ADMIN — Medication 1 TABLET(S): at 17:06

## 2020-06-09 RX ADMIN — Medication 10 MILLIGRAM(S): at 21:12

## 2020-06-09 RX ADMIN — ENOXAPARIN SODIUM 40 MILLIGRAM(S): 100 INJECTION SUBCUTANEOUS at 11:39

## 2020-06-09 RX ADMIN — SODIUM CHLORIDE 500 MILLILITER(S): 9 INJECTION INTRAMUSCULAR; INTRAVENOUS; SUBCUTANEOUS at 16:08

## 2020-06-09 RX ADMIN — TAMSULOSIN HYDROCHLORIDE 0.4 MILLIGRAM(S): 0.4 CAPSULE ORAL at 21:12

## 2020-06-09 RX ADMIN — Medication 1 TABLET(S): at 06:08

## 2020-06-09 RX ADMIN — Medication 10 MILLIGRAM(S): at 11:40

## 2020-06-09 RX ADMIN — SENNA PLUS 2 TABLET(S): 8.6 TABLET ORAL at 21:12

## 2020-06-09 RX ADMIN — POLYETHYLENE GLYCOL 3350 17 GRAM(S): 17 POWDER, FOR SOLUTION ORAL at 17:06

## 2020-06-09 RX ADMIN — Medication 64.8 MILLIGRAM(S): at 11:39

## 2020-06-09 RX ADMIN — POLYETHYLENE GLYCOL 3350 17 GRAM(S): 17 POWDER, FOR SOLUTION ORAL at 06:08

## 2020-06-09 NOTE — PROGRESS NOTE ADULT - SUBJECTIVE AND OBJECTIVE BOX
LENGTH OF HOSPITAL STAY: 21d      CHIEF COMPLAINT:   Patient is a 64y old  Male who presents with a chief complaint of Abd pain and fever (01 Jun 2020 17:05)      OVER Past 24hrs:  The patient was seen and examined at bedside there were no  acute events  during the night.        PAST MEDICAL & SURGICAL HISTORY  PAST MEDICAL & SURGICAL HISTORY:  Asthma  Urinary retention  Urinary calculi  Spastic quadriplegia  Osteoporosis  Seizure: last seizure &gt;10 years ago  Cerebral palsy  BPH (benign prostatic hyperplasia)  Suprapubic catheter  H/O cystoscopy  S/P percutaneous endoscopic gastrostomy (PEG) tube placement        ALLERGIES:  No Known Allergies    MEDICATIONS:  STANDING MEDICATIONS  baclofen 10 milliGRAM(s) Oral three times a day  calcium carbonate   1250 mG (OsCal) 1 Tablet(s) Oral two times a day  calcium carbonate   1250 mG (OsCal) 1 Tablet(s) Oral daily  dicyclomine 10 milliGRAM(s) Oral daily  enoxaparin Injectable 40 milliGRAM(s) SubCutaneous daily  PHENobarbital 64.8 milliGRAM(s) Oral daily  polyethylene glycol 3350 17 Gram(s) Oral every 12 hours  senna 2 Tablet(s) Oral at bedtime  tamsulosin 0.4 milliGRAM(s) Oral at bedtime    PRN MEDICATIONS  acetaminophen   Tablet .. 650 milliGRAM(s) Oral every 6 hours PRN  ALBUTerol    90 MICROgram(s) HFA Inhaler 2 Puff(s) Inhalation every 6 hours PRN  benzonatate 100 milliGRAM(s) Oral every 8 hours PRN  ondansetron Injectable 4 milliGRAM(s) IV Push every 6 hours PRN    VITALS:   T(F): 97.1  HR: 71  BP: 117/62  RR: 16  SpO2: 96%    PHYSICAL EXAM:  General: No acute distress.   interactive,     HEENT: Pupils equal, reactive to light symmetrically.    PULM: Clear to auscultation bilaterally decreased breath sounds in lower lobes, no significant sputum production.    CVS: Regular rate and rhythm, no murmurs, rubs, or gallops.    GI: Soft, nondistended, nontender, no masses. PEG noted no drainage     URINARY: Suprapubic Cath  noted  mild puss around site     MSK: No edema, nontender. Spastic  Quadriplegic     SKIN: Warm and well perfused, no rashes noted.    PSYCH: at baseline

## 2020-06-09 NOTE — PROCEDURE NOTE - NSURITECHNIQUE_GU_A_CORE
Sterile gloves were worn for the duration of the procedure/The site was cleaned with soap/water and sterile solution (betadine)/The collection bag is below the level of the patient and urinary bladder/The urinary drainage system is closed at the end of the procedure/The catheter was secured with a securement device (e.g. StatLock)/Proper hand hygiene was performed/A sterile drape was used to cover all adjacent areas/The catheter was appropriately lubricated

## 2020-06-09 NOTE — PROGRESS NOTE ADULT - ASSESSMENT
Patient is a 63 y/o male with PMH of cerebral palsy, seizure disorder, spastic paraplegia, s/p PEG tube, Asthma, H/O nephrolithiasis, BPH with bladder neck stricture s/p suprapubic catheter, and H/O Pseudomonas and ESBL Proteus mirabilis bacteruria who presented from a group with abdominal pain x1 days.         IMPRESSION   COVID 19 +ve PCR on 5/27 resolved  but remains covid  positive   Urosepsis (resolving) 2/2 pyelonephritis with hydronephrosis and ureter stones with proteus ESBL bacteruria and bacteremia   BPH with bladder neck stricture s/p suprapubic catheter  Acute normocytic anemia- stable   Lactic acidosis: (resolved)   Functional quadriplegia   Cerebral palsy        PLAN   #COVID 19 +ve PCR on 5/27 resolved  but remains covid  positive   - COVID neg on 5/29 then pos 5/30 -  6/2 positive, Positive 06/07   - d/c to group home on 06/08    #urosepsis (resolving) 2/2 pyelonephritis with hydronephrosis and ureter stones with proteus ESBL bacteruria and bacteremia   - s/p R PCT nephrostomy with stent on 5/20, and Percutaneous nephrolithotomy (PCNL) 5/26,   - no further intervention by urology  will change suprapubic cath prior to dc  - Pain control with Toradol PRN, monitor renal function   - c/w Tamsulosin and Bentyl to facilitate passage   - c/w Meropenem (proteus ESBL bacteruria and bacteremia), repeat Bcx negative on 5/22,  - Completed Ertapenem 1 gram QD14 days ended on 6/8/2020  - suprapubic catheter change Q4 weeks, due for change  after speaking with urology 06/19 if  discharging prior please call urology for stat change prior to DC        #lactic acidosis: (resolved)   # Acute normocytic anemia: Hbg stable, likely dilutional, no signs of blood loss, monitor Hbg and keep active type and screening   # Chronic Cough is unlikely from  bronchitis   # Seizure disorder - Continue with phenobarbital 64.8mg PO through PEG tube QD no REEG negative   # Cerebral palsy w/ Spastic quadriplegia - Continue with baclofen 10mg PO three times a day, c/w PEG feeds   # Functional quadriplegia - full care  # Asthma - No wheezing on exam, on RA, Albuterol PRN   # BPH with bladder neck stricture s/p suprapubic catheter - Tube replaced day before admission, Chronic colonization w/ ESBL Proteus Mirabilis and Pseudomonas on last admission,    DVT Prophylaxis: Lovenox 40mg SQ once daily,   GI Prophylaxis: Not indicated   Diet: through PEG tube feeding  Activity: bed rest   Dispo: [facility], , Leslie ([phone]) is the sister and health care proxy, Mitch is the nurse at [facility] [phone],   Code Status: Full Code  Dispo: from group home, Last dose of Abx on 06/08 Patient remains positive will retest 10th

## 2020-06-09 NOTE — PROCEDURE NOTE - NSPROCDETAILS_GEN_ALL_CORE
location identified, draped/prepped, sterile technique used/sterile dressing applied/supine position/ultrasound guidance
sterile technique, indwelling urinary device inserted

## 2020-06-09 NOTE — PROCEDURE NOTE - SUPERVISORY STATEMENT
I was in house and immediately available during the time that the procedure was done. Patient was later seen on evening rounds and catheter placement was checked and was inappropriately.

## 2020-06-09 NOTE — PROCEDURE NOTE - ADDITIONAL PROCEDURE DETAILS
63 yo M with 26 Fr SPT s/p R PCNL on 5/27. Patient seen and examined at bedside. Denies any fever, chills, SOB, abdominal pain at this time. 65 yo M with 26 Fr SPT s/p R PCNL on 5/27. Patient seen and examined at bedside. Suprapubic tube was exchanged for new SPT. Denies any fever, chills, SOB, abdominal pain at this time. Patient tolerated the procedure well.

## 2020-06-10 VITALS
OXYGEN SATURATION: 95 % | HEART RATE: 69 BPM | TEMPERATURE: 97 F | DIASTOLIC BLOOD PRESSURE: 56 MMHG | SYSTOLIC BLOOD PRESSURE: 108 MMHG | RESPIRATION RATE: 18 BRPM

## 2020-06-10 LAB
ALBUMIN SERPL ELPH-MCNC: 3.5 G/DL — SIGNIFICANT CHANGE UP (ref 3.5–5.2)
ALP SERPL-CCNC: 98 U/L — SIGNIFICANT CHANGE UP (ref 30–115)
ALT FLD-CCNC: 27 U/L — SIGNIFICANT CHANGE UP (ref 0–41)
ANION GAP SERPL CALC-SCNC: 14 MMOL/L — SIGNIFICANT CHANGE UP (ref 7–14)
AST SERPL-CCNC: 30 U/L — SIGNIFICANT CHANGE UP (ref 0–41)
BASOPHILS # BLD AUTO: 0.03 K/UL — SIGNIFICANT CHANGE UP (ref 0–0.2)
BASOPHILS NFR BLD AUTO: 0.8 % — SIGNIFICANT CHANGE UP (ref 0–1)
BILIRUB SERPL-MCNC: 0.2 MG/DL — SIGNIFICANT CHANGE UP (ref 0.2–1.2)
BUN SERPL-MCNC: 19 MG/DL — SIGNIFICANT CHANGE UP (ref 10–20)
CALCIUM SERPL-MCNC: 9.1 MG/DL — SIGNIFICANT CHANGE UP (ref 8.5–10.1)
CHLORIDE SERPL-SCNC: 106 MMOL/L — SIGNIFICANT CHANGE UP (ref 98–110)
CO2 SERPL-SCNC: 21 MMOL/L — SIGNIFICANT CHANGE UP (ref 17–32)
CREAT SERPL-MCNC: 0.5 MG/DL — LOW (ref 0.7–1.5)
EOSINOPHIL # BLD AUTO: 0.23 K/UL — SIGNIFICANT CHANGE UP (ref 0–0.7)
EOSINOPHIL NFR BLD AUTO: 6 % — SIGNIFICANT CHANGE UP (ref 0–8)
GLUCOSE SERPL-MCNC: 110 MG/DL — HIGH (ref 70–99)
HCT VFR BLD CALC: 36.6 % — LOW (ref 42–52)
HGB BLD-MCNC: 12.1 G/DL — LOW (ref 14–18)
IMM GRANULOCYTES NFR BLD AUTO: 0.3 % — SIGNIFICANT CHANGE UP (ref 0.1–0.3)
LYMPHOCYTES # BLD AUTO: 1.01 K/UL — LOW (ref 1.2–3.4)
LYMPHOCYTES # BLD AUTO: 26.4 % — SIGNIFICANT CHANGE UP (ref 20.5–51.1)
MCHC RBC-ENTMCNC: 31.5 PG — HIGH (ref 27–31)
MCHC RBC-ENTMCNC: 33.1 G/DL — SIGNIFICANT CHANGE UP (ref 32–37)
MCV RBC AUTO: 95.3 FL — HIGH (ref 80–94)
MONOCYTES # BLD AUTO: 0.46 K/UL — SIGNIFICANT CHANGE UP (ref 0.1–0.6)
MONOCYTES NFR BLD AUTO: 12 % — HIGH (ref 1.7–9.3)
NEUTROPHILS # BLD AUTO: 2.09 K/UL — SIGNIFICANT CHANGE UP (ref 1.4–6.5)
NEUTROPHILS NFR BLD AUTO: 54.5 % — SIGNIFICANT CHANGE UP (ref 42.2–75.2)
NRBC # BLD: 0 /100 WBCS — SIGNIFICANT CHANGE UP (ref 0–0)
PLATELET # BLD AUTO: 241 K/UL — SIGNIFICANT CHANGE UP (ref 130–400)
POTASSIUM SERPL-MCNC: 4.6 MMOL/L — SIGNIFICANT CHANGE UP (ref 3.5–5)
POTASSIUM SERPL-SCNC: 4.6 MMOL/L — SIGNIFICANT CHANGE UP (ref 3.5–5)
PROT SERPL-MCNC: 6.8 G/DL — SIGNIFICANT CHANGE UP (ref 6–8)
RBC # BLD: 3.84 M/UL — LOW (ref 4.7–6.1)
RBC # FLD: 14.3 % — SIGNIFICANT CHANGE UP (ref 11.5–14.5)
SODIUM SERPL-SCNC: 141 MMOL/L — SIGNIFICANT CHANGE UP (ref 135–146)
WBC # BLD: 3.83 K/UL — LOW (ref 4.8–10.8)
WBC # FLD AUTO: 3.83 K/UL — LOW (ref 4.8–10.8)

## 2020-06-10 PROCEDURE — 99239 HOSP IP/OBS DSCHRG MGMT >30: CPT

## 2020-06-10 PROCEDURE — 71045 X-RAY EXAM CHEST 1 VIEW: CPT | Mod: 26

## 2020-06-10 RX ADMIN — Medication 10 MILLIGRAM(S): at 05:18

## 2020-06-10 RX ADMIN — Medication 1 TABLET(S): at 11:04

## 2020-06-10 RX ADMIN — Medication 64.8 MILLIGRAM(S): at 11:10

## 2020-06-10 RX ADMIN — POLYETHYLENE GLYCOL 3350 17 GRAM(S): 17 POWDER, FOR SOLUTION ORAL at 05:18

## 2020-06-10 RX ADMIN — Medication 10 MILLIGRAM(S): at 11:04

## 2020-06-10 RX ADMIN — ENOXAPARIN SODIUM 40 MILLIGRAM(S): 100 INJECTION SUBCUTANEOUS at 11:04

## 2020-06-10 RX ADMIN — Medication 1 TABLET(S): at 05:18

## 2020-06-10 NOTE — PROGRESS NOTE ADULT - ATTENDING COMMENTS
63 y/o male with PMH of cerebral palsy, seizure disorder, spastic paraplegia, s/p PEG tube, Asthma, H/O nephrolithiasis, BPH with bladder neck stricture s/p suprapubic catheter, and H/O Pseudomonas and ESBL Proteus mirabilis bacteruria who presented from a group with abdominal pain x1 days.     PLAN   #COVID 19+ve PCR on 5/27 resolved but remains covid  positive   - COVID neg on 5/29 then pos 5/30, positive on 6/2. Will need to reswab before discharge  - remains stable on RA (O2 sat 94%)  - d/c to group home once neg swab    #urosepsis (resolving) 2/2 pyelonephritis with hydronephrosis and ureter stones with proteus ESBL bacteruria and bacteremia   - s/p R PCT nephrostomy with stent on 5/20, and Percutaneous nephrolithotomy (PCNL) 5/26,   - no further intervention by urology  - Pain control, monitor renal function   - c/w Tamsulosin and Bentyl to facilitate passage   - c/w Meropenem (proteus ESBL bacteruria and bacteremia), repeat Bcx negative on 5/22,on discharge Ertapenem 1 gram QD to complete 14 days ends on 6/8/2020  - monitor UO through right nephrostomy drainage, once zero for the next 24 hours, can be removed  - suprapubic catherter change Q4 weeks, due to change in 2 weeks     #lactic acidosis: (resolved) possibly secondary to nephrostomy now resolved   # Acute normocytic anemia: Hbg stable, likely dilutional, no signs of blood loss, monitor Hbg and keep active type and screening   # Chronic Cough is unlikely from bronchitis   # Seizure disorder - continue with phenobarbital 64.8mg PO through PEG tube QD, no REEG negative   # Cerebral palsy w/ Spastic quadriplegia - Continue with baclofen 10mg PO three times a day, c/w PEG feeds   # Functional quadriplegia - full care  # Asthma - No wheezing on exam, on RA, Albuterol PRN   # BPH with bladder neck stricture s/p suprapubic catheter - Tube replaced day before admission, Chronic colonization w/ ESBL Proteus Mirabilis and Pseudomonas on last admission,    DVT Prophylaxis: Lovenox 40mg SQ once daily  GI Prophylaxis: Not indicated   Diet: through PEG tube feeding  Activity: bed rest   Dispo: [facility], Leslie ([phone]) is the sister and health care proxy, Mitch is the nurse at [facility] [phone],   Code Status: Full Code  Dispo: from [facility], medically stable d/c pending neg swab for COVID . complete abx here 6/8 and discharge. reswab 6/7.
63 y/o male with PMH of cerebral palsy, seizure disorder, spastic paraplegia, s/p PEG tube, Asthma, H/O nephrolithiasis, BPH with bladder neck stricture s/p suprapubic catheter, and H/O Pseudomonas and ESBL Proteus mirabilis bacteruria who presented from a group with abdominal pain x1 days.     PLAN   #COVID 19+ve PCR on 5/27 resolved but remains covid  positive   - COVID neg on 5/29 then pos 5/30, positive on 6/2. Will need to reswab before discharge  - remains stable on RA (O2 sat 94%)  - d/c to group home once neg swab    #urosepsis (resolving) 2/2 pyelonephritis with hydronephrosis and ureter stones with proteus ESBL bacteruria and bacteremia   - s/p R PCT nephrostomy with stent on 5/20, and Percutaneous nephrolithotomy (PCNL) 5/26,   - no further intervention by urology  - Pain control, monitor renal function   - c/w Tamsulosin and Bentyl to facilitate passage   - c/w Meropenem (proteus ESBL bacteruria and bacteremia), repeat Bcx negative on 5/22,on discharge Ertapenem 1 gram QD to complete 14 days ends on 6/8/2020  - monitor UO through right nephrostomy drainage, once zero for the next 24 hours, can be removed  - suprapubic catherter change Q4 weeks, due to change in 2 weeks     #lactic acidosis: (resolved) possibly secondary to nephrostomy now resolved   # Acute normocytic anemia: Hbg stable, likely dilutional, no signs of blood loss, monitor Hbg and keep active type and screening   # Chronic Cough is unlikely from bronchitis   # Seizure disorder - continue with phenobarbital 64.8mg PO through PEG tube QD, no REEG negative   # Cerebral palsy w/ Spastic quadriplegia - Continue with baclofen 10mg PO three times a day, c/w PEG feeds   # Functional quadriplegia - full care  # Asthma - No wheezing on exam, on RA, Albuterol PRN   # BPH with bladder neck stricture s/p suprapubic catheter - Tube replaced day before admission, Chronic colonization w/ ESBL Proteus Mirabilis and Pseudomonas on last admission,    DVT Prophylaxis: Lovenox 40mg SQ once daily  GI Prophylaxis: Not indicated   Diet: through PEG tube feeding  Activity: bed rest   Dispo: snf, Leslie ([phone]) is the sister and health care proxy, Mitch is the nurse at [facility] [phone],   Code Status: Full Code  Dispo: from [facility], medically stable d/c pending neg swab for COVID . complete abx here 6/8 and discharge. reswab 6/7.
65 y/o male with PMH of cerebral palsy, seizure disorder, spastic paraplegia, s/p PEG tube, Asthma, H/O nephrolithiasis, BPH with bladder neck stricture s/p suprapubic catheter, and H/O Pseudomonas and ESBL Proteus mirabilis bacteruria who presented from a group with abdominal pain x1 days.     PLAN   #COVID 19+ve PCR on 5/27 resolved but remains covid  positive   - COVID neg on 5/29 then pos 5/30, positive on 6/2, 6/7.   His  is now saying they may be able to take him with a positive PCR also. The policy makers at the  confirmed this. They are making arrangements to receive him for 6/10. See if they want any certification from ID before he goes there?  - remains stable on RA (O2 sat 94%), afebrile.   -anticipate for group home tomorrow.     #urosepsis (resolving) 2/2 pyelonephritis with hydronephrosis and ureter stones with proteus ESBL bacteruria and bacteremia   - s/p R PCT nephrostomy with stent on 5/20, and Percutaneous nephrolithotomy (PCNL) 5/26,   - no further intervention by urology  - Pain control, monitor renal function   - c/w Tamsulosin and Bentyl to facilitate passage   - s/p Meropenem (proteus ESBL bacteruria and bacteremia), repeat Bcx negative on 5/22, completed 14 days ends on 6/8/2020  - monitor UO through right nephrostomy drainage, once zero for the next 24 hours, can be removed  - suprapubic catherter change Q4 weeks, some pus noted at SPC site. Changed by urology at bedside 6/9.     #lactic acidosis: (resolved) possibly secondary to nephrostomy now resolved   # Acute normocytic anemia: Hbg stable, likely dilutional, no signs of blood loss, monitor Hbg and keep active type and screening   # Chronic Cough is unlikely from bronchitis   # Seizure disorder - continue with phenobarbital 64.8mg PO through PEG tube QD, no REEG negative   # Cerebral palsy w/ Spastic quadriplegia - Continue with baclofen 10mg PO three times a day, c/w PEG feeds   # Functional quadriplegia - full care  # Asthma - No wheezing on exam, on RA, Albuterol PRN   # BPH with bladder neck stricture s/p suprapubic catheter - Tube replaced day before admission, Chronic colonization w/ ESBL Proteus Mirabilis and Pseudomonas on last admission,    DVT Prophylaxis: Lovenox 40mg SQ once daily  GI Prophylaxis: Not indicated   Diet: through PEG tube feeding  Activity: bed rest   Dispo: [facility], Leslie ([phone]) is the sister and health care proxy, Mitch is the nurse at [facility] [phone],   Code Status: Full Code  Dispo: from [facility], medically stable d/c pending neg swab for COVID . completed abx here 6/8. reswabbed 6/7 Positive.    Anticipate for GH tomorrow.
65 y/o male with PMH of cerebral palsy, seizure disorder, spastic paraplegia, s/p PEG tube, Asthma, H/O nephrolithiasis, BPH with bladder neck stricture s/p suprapubic catheter, and H/O Pseudomonas and ESBL Proteus mirabilis bacteruria who presented from a group with abdominal pain x1 days.     PLAN   #COVID 19+ve PCR on 5/27 resolved but remains covid  positive   - COVID neg on 5/29 then pos 5/30, positive on 6/2. Will need to reswab before discharge  - remains stable on RA (O2 sat 94%)  - d/c to group home once neg swab    #urosepsis (resolving) 2/2 pyelonephritis with hydronephrosis and ureter stones with proteus ESBL bacteruria and bacteremia   - s/p R PCT nephrostomy with stent on 5/20, and Percutaneous nephrolithotomy (PCNL) 5/26,   - no further intervention by urology  - Pain control, monitor renal function   - c/w Tamsulosin and Bentyl to facilitate passage   - c/w Meropenem (proteus ESBL bacteruria and bacteremia), repeat Bcx negative on 5/22, to complete 14 days ends on 6/8/2020  - monitor UO through right nephrostomy drainage, once zero for the next 24 hours, can be removed  - suprapubic catherter change Q4 weeks, due to change in 2 weeks     #lactic acidosis: (resolved) possibly secondary to nephrostomy now resolved   # Acute normocytic anemia: Hbg stable, likely dilutional, no signs of blood loss, monitor Hbg and keep active type and screening   # Chronic Cough is unlikely from bronchitis   # Seizure disorder - continue with phenobarbital 64.8mg PO through PEG tube QD, no REEG negative   # Cerebral palsy w/ Spastic quadriplegia - Continue with baclofen 10mg PO three times a day, c/w PEG feeds   # Functional quadriplegia - full care  # Asthma - No wheezing on exam, on RA, Albuterol PRN   # BPH with bladder neck stricture s/p suprapubic catheter - Tube replaced day before admission, Chronic colonization w/ ESBL Proteus Mirabilis and Pseudomonas on last admission,    DVT Prophylaxis: Lovenox 40mg SQ once daily  GI Prophylaxis: Not indicated   Diet: through PEG tube feeding  Activity: bed rest   Dispo: long-termLeslie ([phone]) is the sister and health care proxy, Mitch is the nurse at [facility] [phone],   Code Status: Full Code  Dispo: from [facility], medically stable d/c pending neg swab for COVID . complete abx here 6/8 and discharge. reswab 6/7.
65 y/o male with PMH of cerebral palsy, seizure disorder, spastic paraplegia, s/p PEG tube, Asthma, H/O nephrolithiasis, BPH with bladder neck stricture s/p suprapubic catheter, and H/O Pseudomonas and ESBL Proteus mirabilis bacteruria who presented from a group with abdominal pain x1 days.     PLAN   #COVID 19+ve PCR on 5/27 resolved but remains covid  positive   - COVID neg on 5/29 then pos 5/30, positive on 6/2. Will need to reswab before discharge (reswab 6/5)  - remains stable on RA (O2 sat 94%)  - d/c to group home once neg swab    #urosepsis (resolving) 2/2 pyelonephritis with hydronephrosis and ureter stones with proteus ESBL bacteruria and bacteremia   - s/p R PCT nephrostomy with stent on 5/20, and Percutaneous nephrolithotomy (PCNL) 5/26,   - no further intervention by urology  - Pain control, monitor renal function   - c/w Tamsulosin and Bentyl to facilitate passage   - c/w Meropenem (proteus ESBL bacteruria and bacteremia), repeat Bcx negative on 5/22,on discharge Ertapenem 1 gram QD to complete 14 days ends on 6/8/2020  - monitor UO through right nephrostomy drainage, once zero for the next 24 hours, can be removed  - suprapubic catherter change Q4 weeks, due to change in 2 weeks     #lactic acidosis: (resolved) possibly secondary to nephrostomy now resolved   # Acute normocytic anemia: Hbg stable, likely dilutional, no signs of blood loss, monitor Hbg and keep active type and screening   # Chronic Cough is unlikely from bronchitis   # Seizure disorder - continue with phenobarbital 64.8mg PO through PEG tube QD, no REEG negative   # Cerebral palsy w/ Spastic quadriplegia - Continue with baclofen 10mg PO three times a day, c/w PEG feeds   # Functional quadriplegia - full care  # Asthma - No wheezing on exam, on RA, Albuterol PRN   # BPH with bladder neck stricture s/p suprapubic catheter - Tube replaced day before admission, Chronic colonization w/ ESBL Proteus Mirabilis and Pseudomonas on last admission,    DVT Prophylaxis: Lovenox 40mg SQ once daily  GI Prophylaxis: Not indicated   Diet: through PEG tube feeding  Activity: bed rest   Dispo: [facility], Leslie ([phone]) is the sister and health care proxy, Mitch is the nurse at [facility] [phone],   Code Status: Full Code  Dispo: from [facility], medically stable d/c pending neg swab for COVID
I saw and evaluated patient  by bedside, no active events over past 24 hours , tolerating diet well.  no fever.  All labs, radiology studies, VS was reviewed  I have reviewed the resident's note and agree with documented findings and  plan of care.  Patient is medically stable for d/c to group home today, facility is aware of patient's covid positive status.   outpatient  f/up.
Patient is a 65 y/o male with PMH of cerebral palsy, seizure disorder, spastic paraplegia, s/p PEG tube, Asthma, H/O nephrolithiasis, BPH with bladder neck stricture s/p suprapubic catheter, and H/O Pseudomonas and ESBL Proteus mirabilis bacteruria who presented from a group with abdominal pain x1 days.       #Sepsis due to UTI (resolving) 2/2 pyelonephritis with hydronephrosis and ureter stones with proteus ESBL bacteruria and bacteremia   - s/p R PCT nephrostomy with stent on 5/20  - s/p Percutaneous nephrolithotomy (PCNL) for stone, stent removal  - c/w Meropenem (proteus ESBL bacteruria and bacteremia), Ertapenem on d/c until 6/8/20  -removed right urostomy tube     # h/o +COVID swab       #lactic acidosis: resolved     #normocytic anemia: Hbg stable, likely dilutional, no signs of blood loss, monitor Hbg and keep active type and screening     #Abd distention: resolved   - PEG tube feeding  - bowel regimen     # Seizure disorder - Continue with phenobarbital 64.8mg PO through PEG tube QD (current level is ok as per neuro)    # Cerebral palsy w/ Spastic quadriplegia - Continue with baclofen 10mg PO three times a day, c/w PEG feeds       # Asthma - No wheezing on exam, on RA, Albuterol PRN     # BPH with bladder neck stricture s/p suprapubic catheter - Tube replaced day before admission, Chronic colonization w/ ESBL Proteus Mirabilis, grew Pseudomonas on last admission    # hypermagnesemia- d/c oral magnesium.    #DVT Prophylaxis: Lovenox 40mg SQ once daily,     #GI Prophylaxis: Not indicated     #Diet: through PEG tube feeding. NPO     Activity: bed rest     Dispo: [facility],  Leslie ([phone]) is the sister and health care proxy, Mitch is the nurse at [facility] [phone],   Code Status: Full Code    #Progress Note Handoff: Pending repeated covid swab to result negative , than d/c to SNF for completion of IV abx. till 6/8  Disposition: d/c to SNF
pt seen and examined at bedside  agree with above  for PCNL surgery  with DR Cooper tomorrow
65 y/o male with PMH of cerebral palsy, seizure disorder, spastic paraplegia, s/p PEG tube, Asthma, H/O nephrolithiasis, BPH with bladder neck stricture s/p suprapubic catheter, and H/O Pseudomonas and ESBL Proteus mirabilis bacteruria who presented from a group with abdominal pain x1 days.     PLAN   #COVID 19+ve PCR on 5/27 resolved but remains covid  positive   - COVID neg on 5/29 then pos 5/30, positive on 6/2, 6/7. Will need to reswab before discharge. f/u GH if they will take with positive PCR?  - remains stable on RA (O2 sat 94%)  - d/c to group home once neg swab    #urosepsis (resolving) 2/2 pyelonephritis with hydronephrosis and ureter stones with proteus ESBL bacteruria and bacteremia   - s/p R PCT nephrostomy with stent on 5/20, and Percutaneous nephrolithotomy (PCNL) 5/26,   - no further intervention by urology  - Pain control, monitor renal function   - c/w Tamsulosin and Bentyl to facilitate passage   - c/w Meropenem (proteus ESBL bacteruria and bacteremia), repeat Bcx negative on 5/22, to complete 14 days ends on 6/8/2020  - monitor UO through right nephrostomy drainage, once zero for the next 24 hours, can be removed  - suprapubic catherter change Q4 weeks, due to change next on 6/19. ?Outpatient. some pus noted at SPC.     #lactic acidosis: (resolved) possibly secondary to nephrostomy now resolved   # Acute normocytic anemia: Hbg stable, likely dilutional, no signs of blood loss, monitor Hbg and keep active type and screening   # Chronic Cough is unlikely from bronchitis   # Seizure disorder - continue with phenobarbital 64.8mg PO through PEG tube QD, no REEG negative   # Cerebral palsy w/ Spastic quadriplegia - Continue with baclofen 10mg PO three times a day, c/w PEG feeds   # Functional quadriplegia - full care  # Asthma - No wheezing on exam, on RA, Albuterol PRN   # BPH with bladder neck stricture s/p suprapubic catheter - Tube replaced day before admission, Chronic colonization w/ ESBL Proteus Mirabilis and Pseudomonas on last admission,    DVT Prophylaxis: Lovenox 40mg SQ once daily  GI Prophylaxis: Not indicated   Diet: through PEG tube feeding  Activity: bed rest   Dispo: [facility], Leslie ([phone]) is the sister and health care proxy, Mitch is the nurse at [facility] [phone],   Code Status: Full Code  Dispo: from [facility], medically stable d/c pending neg swab for COVID . completed abx here 6/8. reswabbed 6/7 Positive.
I have seen and evaluated the patient  I agree with the content of the progress note
Patient is a 65 y/o male with PMH of cerebral palsy, seizure disorder, spastic paraplegia, s/p PEG tube, Asthma, H/O nephrolithiasis, BPH with bladder neck stricture s/p suprapubic catheter, and H/O Pseudomonas and ESBL Proteus mirabilis bacteruria who presented from a group with abdominal pain x1 days.       #Sepsis due to UTI (resolving) 2/2 pyelonephritis with hydronephrosis and ureter stones with proteus ESBL bacteruria and bacteremia   - s/p R PCT nephrostomy with stent on 5/20  - s/p Percutaneous nephrolithotomy (PCNL) for stone, stent removal  - c/w Meropenem (proteus ESBL bacteruria and bacteremia), Ertapenem on d/c until 6/8/20  -removed right urostomy tube     # h/o +COVID swab   -repeated COVID  negative     #lactic acidosis: resolved     #normocytic anemia: Hbg stable, likely dilutional, no signs of blood loss, monitor Hbg and keep active type and screening     #Abd distention: resolved   - PEG tube feeding  - bowel regimen     # Seizure disorder - Continue with phenobarbital 64.8mg PO through PEG tube QD (current level is ok as per neuro)    # Cerebral palsy w/ Spastic quadriplegia - Continue with baclofen 10mg PO three times a day, c/w PEG feeds       # Asthma - No wheezing on exam, on RA, Albuterol PRN     # BPH with bladder neck stricture s/p suprapubic catheter - Tube replaced day before admission, Chronic colonization w/ ESBL Proteus Mirabilis, grew Pseudomonas on last admission    # hypermagnesemia- d/c oral magnesium.    #DVT Prophylaxis: Lovenox 40mg SQ once daily,     #GI Prophylaxis: Not indicated     #Diet: through PEG tube feeding. NPO     Activity: bed rest     Dispo: [facility],  Leslie ([phone]) is the sister and health care proxy, Mitch is the nurse at [facility] living [phone],   Code Status: Full Code    #Progress Note Handoff: Pending d/c on monday   Disposition: d/c to group [facility] on monday
Patient seen and examined independently earlier today. Case discussed with housestaff, nursing, social work. I agree with most of the resident's note, physical exam, and plan except as below. Patient feels well. No new complaints. Denies CP, SOB, AP. Remainder ROS unremarkable. On exam, NAD, alert; decreased BS at bases, NT/ND/S, +BS; Rt ureteral drainage; contracted, no edema. Cont Ava; d/w  - plan for PCNL on Tuesday. CXR w/ increased LLL opacity (chronic elevated Lt diaphragm). Will ask pulm for comment and preop clearance. Cardio clearance on chart. Repeat phenobarb somewhat low but as per neuro, cont current dose. Asp-n and seizure precautions. Bowel regimen. Cont aggressive decub prevention and treatment protocols.      #Progress Note Handoff  Pending (specify):  Consults___Pulm_Clinical improvement and stability__x__Tests__ Cx, PCNL___, PT________  Pt/Family discussion: Pt informed and agrees with the current plan  Disposition: Home__x____SNF_______To be determined________ .     35 minutes spent on total encounter; more than 50% of the visit was spent counseling and/or coordinating care by the attending physician.
Pt seen and examined 5/20/20. pcn draining clear urine.   ct a/p images reviewed w dr dickerson previously showing ureteral and renal stones.   cont abx  fu cultures
pt seen and examined at bedside  agree with above  for pcnl on tuesday by Dr Cooper  awaiting medical clearance
note reviewed and pt discussed with PA's
Patient seen and examined independently earlier today. Case discussed with housestaff, nursing, social work, . I agree with most of the resident's note, physical exam, and plan except as below. Patient feels better. No new complaints. Denies CP, SOB, AP. Remainder ROS unremarkable. On exam, NAD, alert; decreased BS at bases, NT/ND/S, +BS; Rt ureteral drainage; contracted, no edema. Cont Ava; d/w  - plan for PCNL on Tuesday. CXR w/ increased LLL opacity (?asp-n). Will get ID eval. F/u UCx, BCx. Repeat phenobarb level. Asp-n and seizure precautions. Bowel regimen. Check echo.      #Progress Note Handoff  Pending (specify):  Consults___ID_Clinical improvement and stability__x__Tests__Echo, Cx, PCNL___, PT________  Pt/Family discussion: Pt informed and agrees with the current plan  Disposition: Home__x____SNF_______To be determined________
Patient seen and examined independently earlier today. Case discussed with housestaff, nursing, social work. I agree with most of the resident's note, physical exam, and plan except as below. Patient feels better. No new complaints. Denies CP, SOB, AP. Remainder ROS unremarkable. On exam, NAD, alert; decreased BS at bases, NT/ND/S, +BS; Rt ureteral drainage; contracted, no edema. Cont Ava; d/w  - plan for PCNL on Tuesday. CXR w/ increased LLL opacity (?asp-n). Repeat phenobarb level still low, will ask neuro if need to adjust the dose. Asp-n and seizure precautions. Bowel regimen. Cardio clearance on chart      #Progress Note Handoff  Pending (specify):  Consults___ID_Clinical improvement and stability__x__Tests__ Cx, PCNL___, PT________  Pt/Family discussion: Pt informed and agrees with the current plan  Disposition: Home__x____SNF_______To be determined________ .     40 minutes spent on total encounter; more than 50% of the visit was spent counseling and/or coordinating care by the attending physician.
Patient seen and examined independently. Agree with resident note/ history / physical exam and plan of care with no  exceptions/additions/updates. Case discussed with patient/pt decision maker, house-staff and nursing.

## 2020-06-10 NOTE — PROGRESS NOTE ADULT - NSHPATTENDINGPLANDISCUSS_GEN_ALL_CORE
TEAM
house staff
Housestaff, nursing
Housestaff, nursing, social work
ABHILASH Glynn
PA
ABHILASH Johnson
house staff

## 2020-06-10 NOTE — PROGRESS NOTE ADULT - SUBJECTIVE AND OBJECTIVE BOX
LENGTH OF HOSPITAL STAY: 22d      CHIEF COMPLAINT:   Patient is a 64y old  Male who presents with a chief complaint of Abd pain and fever (01 Jun 2020 17:05)      OVER Past 24hrs:  The patient was seen and examined at bedside there were no acute vents  during the night.      PAST MEDICAL & SURGICAL HISTORY  PAST MEDICAL & SURGICAL HISTORY:  Asthma  Urinary retention  Urinary calculi  Spastic quadriplegia  Osteoporosis  Seizure: last seizure &gt;10 years ago  Cerebral palsy  BPH (benign prostatic hyperplasia)  Suprapubic catheter  H/O cystoscopy  S/P percutaneous endoscopic gastrostomy (PEG) tube placement            ALLERGIES:  No Known Allergies    MEDICATIONS:  STANDING MEDICATIONS  baclofen 10 milliGRAM(s) Oral three times a day  calcium carbonate   1250 mG (OsCal) 1 Tablet(s) Oral two times a day  calcium carbonate   1250 mG (OsCal) 1 Tablet(s) Oral daily  dicyclomine 10 milliGRAM(s) Oral daily  enoxaparin Injectable 40 milliGRAM(s) SubCutaneous daily  PHENobarbital 64.8 milliGRAM(s) Oral daily  polyethylene glycol 3350 17 Gram(s) Oral every 12 hours  senna 2 Tablet(s) Oral at bedtime  tamsulosin 0.4 milliGRAM(s) Oral at bedtime    PRN MEDICATIONS  acetaminophen   Tablet .. 650 milliGRAM(s) Oral every 6 hours PRN  ALBUTerol    90 MICROgram(s) HFA Inhaler 2 Puff(s) Inhalation every 6 hours PRN  benzonatate 100 milliGRAM(s) Oral every 8 hours PRN  ondansetron Injectable 4 milliGRAM(s) IV Push every 6 hours PRN    VITALS:   T(F): 97.3  HR: 61  BP: 100/60  RR: 16  SpO2: 94%    PHYSICAL EXAM:  General: No acute distress.   interactive,     HEENT: Pupils equal, reactive to light symmetrically.    PULM: Clear to auscultation bilaterally decreased breath sounds in lower lobes, no significant sputum production.    CVS: Regular rate and rhythm, no murmurs, rubs, or gallops.    GI: Soft, nondistended, nontender, no masses. PEG noted no drainage     URINARY: Suprapubic Cath  noted  mild puss around site     MSK: No edema, nontender. Spastic  Quadriplegic     SKIN: Warm and well perfused, no rashes noted.    PSYCH: at baseline       LABS:                        12.1   3.83  )-----------( 241      ( 10 Kendrick 2020 06:48 )             36.6     06-10    141  |  106  |  19  ----------------------------<  110<H>  4.6   |  21  |  0.5<L>    Ca    9.1      10 Kendrick 2020 06:48  Phos  3.8     06-09  Mg     2.0     06-09    TPro  6.8  /  Alb  3.5  /  TBili  0.2  /  DBili  x   /  AST  30  /  ALT  27  /  AlkPhos  98  06-10                  RADIOLOGY:

## 2020-06-10 NOTE — PROGRESS NOTE ADULT - ASSESSMENT
Patient is a 65 y/o male with PMH of cerebral palsy, seizure disorder, spastic paraplegia, s/p PEG tube, Asthma, H/O nephrolithiasis, BPH with bladder neck stricture s/p suprapubic catheter, and H/O Pseudomonas and ESBL Proteus mirabilis bacteruria who presented from a group with abdominal pain x1 days.         IMPRESSION   COVID 19 +ve PCR on 5/27 resolved  but remains covid  positive   Urosepsis (resolving) 2/2 pyelonephritis with hydronephrosis and ureter stones with proteus ESBL bacteruria and bacteremia   BPH with bladder neck stricture s/p suprapubic catheter  Acute normocytic anemia- stable   Lactic acidosis: (resolved)   Functional quadriplegia   Cerebral palsy        PLAN   #COVID 19 +ve PCR on 5/27 resolved  but remains covid  positive   - COVID neg on 5/29 then pos 5/30 -  6/2 positive, Positive 06/07   - d/c to group home on 06/08    #urosepsis (resolving) 2/2 pyelonephritis with hydronephrosis and ureter stones with proteus ESBL bacteruria and bacteremia   - s/p R PCT nephrostomy with stent on 5/20, and Percutaneous nephrolithotomy ([facility]) 5/26,   - no further intervention by urology  will change suprapubic cath prior to dc  - Pain control with Toradol PRN, monitor renal function   - c/w Tamsulosin and Bentyl to facilitate passage   - c/w Meropenem (proteus ESBL bacteruria and bacteremia), repeat Bcx negative on 5/22,  - Completed Ertapenem 1 gram QD14 days ended on 6/8/2020  - suprapubic catheter change Q4 weeks, tube last changed 06/09  #lactic acidosis: (resolved)   # Acute normocytic anemia: Hbg stable, likely dilutional, no signs of blood loss, monitor Hbg and keep active type and screening   # Chronic Cough is unlikely from  bronchitis   # Seizure disorder - Continue with phenobarbital 64.8mg PO through PEG tube QD no REEG negative   # Cerebral palsy w/ Spastic quadriplegia - Continue with baclofen 10mg PO three times a day, c/w PEG feeds   # Functional quadriplegia - full care  # Asthma - No wheezing on exam, on RA, Albuterol PRN   # BPH with bladder neck stricture s/p suprapubic catheter - Tube replaced 06/09, Chronic colonization w/ ESBL Proteus Mirabilis and Pseudomonas on last admission,    DVT Prophylaxis: Lovenox 40mg SQ once daily,   GI Prophylaxis: Not indicated   Diet: through PEG tube feeding  Activity: bed rest   Dispo: [facility], , Leslie ([phone]) is the sister and health care proxy, Mitch is the nurse at [facility] [phone],   Code Status: Full Code  Dispo: from group [place], Last dose of Abx on 06/08 Patient remains positive will retest 10th. [facility] is willing to accept while positive

## 2020-06-12 ENCOUNTER — APPOINTMENT (OUTPATIENT)
Dept: UROLOGY | Facility: CLINIC | Age: 65
End: 2020-06-12
Payer: MEDICARE

## 2020-06-12 VITALS — TEMPERATURE: 98.6 F | HEIGHT: 65 IN | WEIGHT: 180 LBS | BODY MASS INDEX: 29.99 KG/M2

## 2020-06-12 PROCEDURE — 99213 OFFICE O/P EST LOW 20 MIN: CPT | Mod: 24

## 2020-06-12 NOTE — REVIEW OF SYSTEMS
[see HPI] : see HPI [Negative] : Endocrine [FreeTextEntry1] : poor dentiotion, CP handicap in a whhelchair

## 2020-06-12 NOTE — LETTER HEADER
[FreeTextEntry3] : Bonnie Cooper M.D.\par Director of Urology\par [facility]\par [address] [place] [address], [address]\[place] [address]

## 2020-06-12 NOTE — HISTORY OF PRESENT ILLNESS
[FreeTextEntry1] : Jenaro is a 64-year-old male followed for years by Dr. Cooley with the suprapubic tube secondary to and included ureter. Intermittent stones and was recently in the hospital worry percutaneous nephrolithotripsy getting out almost all of the stones on his right side. He was left with a three and a 4 mm stone in the lower pole flight leaving the hospital decision. He was changed it was raining well at the nursing home call today and is sticking around. It doesn’t to bring him in. [Urinary Incontinence] : urinary incontinence [Urinary Retention] : urinary retention

## 2020-06-12 NOTE — LETTER BODY
[Dear  ___] : Dear  [unfilled], [Courtesy Letter:] : I had the pleasure of seeing your patient, [unfilled], in my office today. [Please see my note below.] : Please see my note below. [Sincerely,] : Sincerely, [FreeTextEntry2] : Dr. Brigido Rao

## 2020-06-12 NOTE — PHYSICAL EXAM
[Abdomen Soft] : soft [Abdomen Tenderness] : non-tender [] : no respiratory distress [Respiration, Rhythm And Depth] : normal respiratory rhythm and effort [Exaggerated Use Of Accessory Muscles For Inspiration] : no accessory muscle use [Affect] : the affect was normal [Oriented To Time, Place, And Person] : oriented to person, place, and time [Mood] : the mood was normal [Not Anxious] : not anxious [FreeTextEntry1] : in a wheelchair

## 2020-06-12 NOTE — ASSESSMENT
[FreeTextEntry1] : Upon exam there is urine in the drainage bag and when I push on the tube he goes into the bladder without pain. When I opened the diaper I saw that the tube was going through the stat lock, not being secured and was acting as a fulcrum kinking the [place].\par \par I showed this to the a that was with him showed her how to secured properly and will see if positioning it well and making sure that the tubing is without gaining thousand problem. If not that will bring him back in on Monday and we will change the tube\par

## 2020-06-15 ENCOUNTER — EMERGENCY (EMERGENCY)
Facility: HOSPITAL | Age: 65
LOS: 0 days | Discharge: HOME | End: 2020-06-16
Attending: EMERGENCY MEDICINE | Admitting: EMERGENCY MEDICINE
Payer: MEDICARE

## 2020-06-15 VITALS
TEMPERATURE: 99 F | RESPIRATION RATE: 18 BRPM | OXYGEN SATURATION: 94 % | DIASTOLIC BLOOD PRESSURE: 66 MMHG | HEART RATE: 86 BPM | SYSTOLIC BLOOD PRESSURE: 118 MMHG

## 2020-06-15 DIAGNOSIS — Z79.899 OTHER LONG TERM (CURRENT) DRUG THERAPY: ICD-10-CM

## 2020-06-15 DIAGNOSIS — N13.6 PYONEPHROSIS: ICD-10-CM

## 2020-06-15 DIAGNOSIS — J45.909 UNSPECIFIED ASTHMA, UNCOMPLICATED: ICD-10-CM

## 2020-06-15 DIAGNOSIS — U07.1 COVID-19: ICD-10-CM

## 2020-06-15 DIAGNOSIS — T83.030A LEAKAGE OF CYSTOSTOMY CATHETER, INITIAL ENCOUNTER: ICD-10-CM

## 2020-06-15 DIAGNOSIS — Z79.51 LONG TERM (CURRENT) USE OF INHALED STEROIDS: ICD-10-CM

## 2020-06-15 DIAGNOSIS — D64.9 ANEMIA, UNSPECIFIED: ICD-10-CM

## 2020-06-15 DIAGNOSIS — N32.0 BLADDER-NECK OBSTRUCTION: ICD-10-CM

## 2020-06-15 DIAGNOSIS — Z98.890 OTHER SPECIFIED POSTPROCEDURAL STATES: ICD-10-CM

## 2020-06-15 DIAGNOSIS — N31.9 NEUROMUSCULAR DYSFUNCTION OF BLADDER, UNSPECIFIED: ICD-10-CM

## 2020-06-15 DIAGNOSIS — N17.9 ACUTE KIDNEY FAILURE, UNSPECIFIED: ICD-10-CM

## 2020-06-15 DIAGNOSIS — N40.1 BENIGN PROSTATIC HYPERPLASIA WITH LOWER URINARY TRACT SYMPTOMS: ICD-10-CM

## 2020-06-15 DIAGNOSIS — Z93.1 GASTROSTOMY STATUS: ICD-10-CM
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
## 2020-06-15 DIAGNOSIS — R53.81 OTHER MALAISE: ICD-10-CM

## 2020-06-15 DIAGNOSIS — Z93.59 OTHER CYSTOSTOMY STATUS: Chronic | ICD-10-CM

## 2020-06-15 DIAGNOSIS — A41.59 OTHER GRAM-NEGATIVE SEPSIS: ICD-10-CM

## 2020-06-15 DIAGNOSIS — G80.0 SPASTIC QUADRIPLEGIC CEREBRAL PALSY: ICD-10-CM

## 2020-06-15 DIAGNOSIS — R05 COUGH: ICD-10-CM

## 2020-06-15 DIAGNOSIS — N20.0 CALCULUS OF KIDNEY: ICD-10-CM

## 2020-06-15 DIAGNOSIS — G40.909 EPILEPSY, UNSPECIFIED, NOT INTRACTABLE, WITHOUT STATUS EPILEPTICUS: ICD-10-CM

## 2020-06-15 DIAGNOSIS — E83.41 HYPERMAGNESEMIA: ICD-10-CM
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
## 2020-06-15 DIAGNOSIS — M81.0 AGE-RELATED OSTEOPOROSIS WITHOUT CURRENT PATHOLOGICAL FRACTURE: ICD-10-CM

## 2020-06-15 DIAGNOSIS — F79 UNSPECIFIED INTELLECTUAL DISABILITIES: ICD-10-CM

## 2020-06-15 DIAGNOSIS — Z98.890 OTHER SPECIFIED POSTPROCEDURAL STATES: Chronic | ICD-10-CM

## 2020-06-15 DIAGNOSIS — R33.8 OTHER RETENTION OF URINE: ICD-10-CM

## 2020-06-15 DIAGNOSIS — F03.90 UNSPECIFIED DEMENTIA WITHOUT BEHAVIORAL DISTURBANCE: ICD-10-CM

## 2020-06-15 DIAGNOSIS — M51.36 OTHER INTERVERTEBRAL DISC DEGENERATION, LUMBAR REGION: ICD-10-CM

## 2020-06-15 DIAGNOSIS — Z93.1 GASTROSTOMY STATUS: Chronic | ICD-10-CM
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
## 2020-06-15 DIAGNOSIS — D72.810 LYMPHOCYTOPENIA: ICD-10-CM

## 2020-06-15 DIAGNOSIS — Z93.51 CUTANEOUS-VESICOSTOMY STATUS: ICD-10-CM

## 2020-06-15 DIAGNOSIS — E87.2 ACIDOSIS: ICD-10-CM

## 2020-06-15 DIAGNOSIS — N40.0 BENIGN PROSTATIC HYPERPLASIA WITHOUT LOWER URINARY TRACT SYMPTOMS: ICD-10-CM

## 2020-06-15 DIAGNOSIS — J98.6 DISORDERS OF DIAPHRAGM: ICD-10-CM

## 2020-06-15 LAB
ALBUMIN SERPL ELPH-MCNC: 3.9 G/DL — SIGNIFICANT CHANGE UP (ref 3.5–5.2)
ALP SERPL-CCNC: 104 U/L — SIGNIFICANT CHANGE UP (ref 30–115)
ALT FLD-CCNC: 19 U/L — SIGNIFICANT CHANGE UP (ref 0–41)
ANION GAP SERPL CALC-SCNC: 14 MMOL/L — SIGNIFICANT CHANGE UP (ref 7–14)
APTT BLD: 31.1 SEC — SIGNIFICANT CHANGE UP (ref 27–39.2)
AST SERPL-CCNC: 21 U/L — SIGNIFICANT CHANGE UP (ref 0–41)
BASOPHILS # BLD AUTO: 0.02 K/UL — SIGNIFICANT CHANGE UP (ref 0–0.2)
BASOPHILS NFR BLD AUTO: 0.5 % — SIGNIFICANT CHANGE UP (ref 0–1)
BILIRUB SERPL-MCNC: 0.4 MG/DL — SIGNIFICANT CHANGE UP (ref 0.2–1.2)
BLD GP AB SCN SERPL QL: SIGNIFICANT CHANGE UP
BUN SERPL-MCNC: 28 MG/DL — HIGH (ref 10–20)
CALCIUM SERPL-MCNC: 9.8 MG/DL — SIGNIFICANT CHANGE UP (ref 8.5–10.1)
CHLORIDE SERPL-SCNC: 108 MMOL/L — SIGNIFICANT CHANGE UP (ref 98–110)
CO2 SERPL-SCNC: 27 MMOL/L — SIGNIFICANT CHANGE UP (ref 17–32)
CREAT SERPL-MCNC: 0.6 MG/DL — LOW (ref 0.7–1.5)
EOSINOPHIL # BLD AUTO: 0.37 K/UL — SIGNIFICANT CHANGE UP (ref 0–0.7)
EOSINOPHIL NFR BLD AUTO: 8.4 % — HIGH (ref 0–8)
GLUCOSE SERPL-MCNC: 104 MG/DL — HIGH (ref 70–99)
HCT VFR BLD CALC: 40.1 % — LOW (ref 42–52)
HGB BLD-MCNC: 13.1 G/DL — LOW (ref 14–18)
IMM GRANULOCYTES NFR BLD AUTO: 0.2 % — SIGNIFICANT CHANGE UP (ref 0.1–0.3)
INR BLD: 1.1 RATIO — SIGNIFICANT CHANGE UP (ref 0.65–1.3)
LACTATE SERPL-SCNC: 1 MMOL/L — SIGNIFICANT CHANGE UP (ref 0.7–2)
LYMPHOCYTES # BLD AUTO: 1.18 K/UL — LOW (ref 1.2–3.4)
LYMPHOCYTES # BLD AUTO: 26.9 % — SIGNIFICANT CHANGE UP (ref 20.5–51.1)
MCHC RBC-ENTMCNC: 31.2 PG — HIGH (ref 27–31)
MCHC RBC-ENTMCNC: 32.7 G/DL — SIGNIFICANT CHANGE UP (ref 32–37)
MCV RBC AUTO: 95.5 FL — HIGH (ref 80–94)
MONOCYTES # BLD AUTO: 0.44 K/UL — SIGNIFICANT CHANGE UP (ref 0.1–0.6)
MONOCYTES NFR BLD AUTO: 10 % — HIGH (ref 1.7–9.3)
NEUTROPHILS # BLD AUTO: 2.36 K/UL — SIGNIFICANT CHANGE UP (ref 1.4–6.5)
NEUTROPHILS NFR BLD AUTO: 54 % — SIGNIFICANT CHANGE UP (ref 42.2–75.2)
NRBC # BLD: 0 /100 WBCS — SIGNIFICANT CHANGE UP (ref 0–0)
PLATELET # BLD AUTO: 204 K/UL — SIGNIFICANT CHANGE UP (ref 130–400)
POTASSIUM SERPL-MCNC: 4.4 MMOL/L — SIGNIFICANT CHANGE UP (ref 3.5–5)
POTASSIUM SERPL-SCNC: 4.4 MMOL/L — SIGNIFICANT CHANGE UP (ref 3.5–5)
PROT SERPL-MCNC: 7.6 G/DL — SIGNIFICANT CHANGE UP (ref 6–8)
PROTHROM AB SERPL-ACNC: 12.7 SEC — SIGNIFICANT CHANGE UP (ref 9.95–12.87)
RBC # BLD: 4.2 M/UL — LOW (ref 4.7–6.1)
RBC # FLD: 14.2 % — SIGNIFICANT CHANGE UP (ref 11.5–14.5)
SODIUM SERPL-SCNC: 149 MMOL/L — HIGH (ref 135–146)
WBC # BLD: 4.38 K/UL — LOW (ref 4.8–10.8)
WBC # FLD AUTO: 4.38 K/UL — LOW (ref 4.8–10.8)

## 2020-06-15 PROCEDURE — 99284 EMERGENCY DEPT VISIT MOD MDM: CPT | Mod: CS,GC

## 2020-06-15 NOTE — ED ADULT NURSE REASSESSMENT NOTE - NS ED NURSE REASSESS COMMENT FT1
Suprapubic catheter placed @ bedside by provider. Awaiting US for confirmation of placement. Awaiting further orders.  Pt updated on continuing plan of care. Will continue to monitor. Suprapubic catheter placed @ bedside by urology. Awaiting US for confirmation of placement. No urinary output @ this time. Awaiting further orders. Pt updated on continuing plan of care. Will continue to monitor.

## 2020-06-15 NOTE — ED ADULT NURSE REASSESSMENT NOTE - NS ED NURSE REASSESS COMMENT FT1
Pt's aid no longer @ bedside and left without notifying staff. Pt is in bed on stretcher on cardiac monitor. Pt reports no distress @ this time. Vitals entered into chart. Awaiting further orders, will continue to monitor.

## 2020-06-15 NOTE — ED PROVIDER NOTE - PATIENT PORTAL LINK FT
You can access the FollowMyHealth Patient Portal offered by [facility] by registering at the following website: [email or web address]. By joining [facility] FollowMyHealth portal, you will also be able to view your health information using other applications (apps) compatible with our system.

## 2020-06-15 NOTE — ED PROVIDER NOTE - PHYSICAL EXAMINATION
CONSTITUTIONAL: contracted, nontoxic appearing, in no acute distress, speaking in full sentences  SKIN: warm, dry, no rash, cap refill < 2 seconds  HEENT: normocephalic, atraumatic, no conjunctival erythema, moist mucous membranes, patent airway  NECK: supple  CV:  regular rate, regular rhythm, 2+ radial pulses bilaterally  RESP: no wheezes, no rales, no rhonchi, normal work of breathing  ABD: soft, nontender, nondistended, no rebound, no guarding, suprapubic cath site with surrounding erythema and mild purulent drainage, diaper soaked with urine, <20cc urine in alatorre bag  MSK: normal ROM, no cyanosis, no edema  NEURO: alert, oriented, grossly unremarkable  PSYCH: cooperative, appropriate

## 2020-06-15 NOTE — ED PROVIDER NOTE - OBJECTIVE STATEMENT
65 yo M with PMHx of asthma, BPH, cerebral palsy, osteoporosis, seizure, spastic quadriplegia, urinary retention/calculi s/p suprapubic cath who presents with UCP for suprapubic cath. Pt has had 26Fr suprapubic cath for several yrs and was most recently changed on 6/9 during his admission 5/19-6/10 for urosepsis 2/2 pyelonephritis with hydronephrosis and ureter stones with proteus ESBL bacteruria and bacteremia. Was d/c'ed home on IV abx through midline and was instructed to change alatorre q4wks. Today pt noticed that urine was leaking from stoma site and soaking diaper without any urine in alatorre bag so came to ED. No fever, chills, nausea, vomiting, abd pain.     Uro: Dr. Cooper

## 2020-06-15 NOTE — ED ADULT TRIAGE NOTE - CHIEF COMPLAINT QUOTE
Pt's suprapubic catheter was being replaced by provider, difficulty placing the new catheter. Pt sent to ER and arrives with a open wound where the original suprapubic catheter was placed. Pt denies any SOB, headache, pain, cough, fever.

## 2020-06-15 NOTE — ED PROVIDER NOTE - PROGRESS NOTE DETAILS
TC: Spoke to uro who will send down 26Fr suprapubic cath. TC: Deflated balloon and removed old alatorre without difficulty. Attempted to place 26Fr alatorre but met with resistance and no UOP in alatorre, +bloody urine leaking around stoma site. Called uro. TC: Deflated balloon and removed old alatorre without difficulty. Attempted to place 26Fr alatorre but met with resistance and no UOP in alatorre, +bloody urine leaking around stoma site. Called uro ABHILASH Quick who will come see pt. TC: Difficult to visualize bladder volume or alatorre balloon on bedside US due to heterogenous echogenicity. Spoke to uro who will send down 26Fr suprapubic cath. TC: Uro PA attempted to place 26Fr again. Inserted without difficulty but no UOP in alatorre. Flushed with 240cc sterile water but no return. Recommended renal US to confirm placement. TC: 63 yo M with PMHx of asthma, BPH, cerebral palsy, osteoporosis, seizure, spastic quadriplegia, urinary retention/calculi s/p suprapubic cath who presents with leaking from cath site and no UOP in alatorre. No systemic sx. Last changed 1 wk ago. Here in ED, vitals wnl. Abd soft, nontender. <20cc urine in alatorre bag. Difficult to visualize bladder volume or alatorre balloon on bedside US due to heterogenous echogenicity. Spoke to uro who will send down 26Fr suprapubic cath. TC: Deflated balloon and removed old alatorre without difficulty. Attempted to place 26Fr alatorre but met with resistance assisted through and no UOP in alatorre, +bloody urine leaking around stoma site. Called uro ABHILASH Gomes who will come see pt. TC: Labs wnl. Spoke with [facility], states they will send for pt. TC: US confirms alatorre placement however pt still without any UOP. Called uro PA for irrigation. TC: Spoke with uro who reviewed US with radiology. Confirmed intraluminal balloon however bladder is nondistended. Uro will attempt to irrigate alatorre. TC: Uro irrigated alatorre with another 80cc, again no UOP in alatorre but with leaking from stoma onto diaper. To discuss case with uro attending for dispo. TC: Uro recommended ditropan 10mg XL daily for bladder spasm and to f/u with uro clinic.

## 2020-06-15 NOTE — ED PROVIDER NOTE - NS ED ROS FT
GEN:  no fever, no chills, no generalized weakness  NEURO:  no headache, no dizziness  ENT: no sore throat, no runny nose  CV:  no chest pain, no palpitations  RESP:  no sob, no cough  GI:  no nausea, no vomiting, no abdominal pain  :  + hematuria  MSK:  no joint pain, no edema  SKIN:  no rash, no bruising  HEME: no hematochezia, no melena

## 2020-06-15 NOTE — ED ADULT NURSE NOTE - NSIMPLEMENTINTERV_GEN_ALL_ED
Implemented All Fall Risk Interventions:  [place] to call system. Call bell, personal items and telephone within reach. Instruct patient to call for assistance. Room bathroom lighting operational. Non-slip footwear when patient is off stretcher. Physically safe environment: no spills, clutter or unnecessary equipment. Stretcher in lowest position, wheels locked, appropriate side rails in place. Provide visual cue, wrist band, yellow gown, etc. Monitor gait and stability. Monitor for mental status changes and reorient to person, place, and time. Review medications for side effects contributing to fall risk. Reinforce activity limits and safety measures with patient and family.

## 2020-06-15 NOTE — CONSULT NOTE ADULT - ASSESSMENT
63y/o M with SPT complications    - 26Fr SPT changed at bedside, irrigated with 240cc SW with no return  - Please obtain bladder US to confirm placement in bladder.

## 2020-06-15 NOTE — ED PROVIDER NOTE - NSFOLLOWUPCLINICS_GEN_ALL_ED_FT
[facility]  Urology  .  [place]   Phone: [phone]  Fax:   Follow Up Time:

## 2020-06-15 NOTE — ED PROVIDER NOTE - ATTENDING CONTRIBUTION TO CARE
64M PMH CP spastic quadriplegia, sz, peg tube, suprapubic cath, BPH, ESBL UTIs, kidney stones, asthma p/w leaking around tube and no UOP in bag x 1 day. No abd pain, nvdc. No fever. No cp, sob. +COVID asymptomatic. - Kenneth, has had suprapubic cath for a while but last changed 1 week ago in hospital.    on exam, AFVSS, well dee nad, ncat, eomi, perrla, mmm, lctab, rrr nl s1s2 no mrg, abd soft ntnd peg tube in place c/d/i, suprapubic catheter removed upon my eval, no active bleeding or leaking urine, aaox3, contractures chronic, no le edema or calf ttp,     a/p; Suprapubic catheter malfunction, will get urology consult

## 2020-06-15 NOTE — ED ADULT NURSE NOTE - CHIEF COMPLAINT QUOTE
Pt's suprapubic catheter was being replaced by provider, difficulty placing the new catheter. Pt sent to ER and arrives with a open wound where the original suprapubic catheter was placed. Pt denies any SOB, headache, pain, cough, fever. Pt direct to hot zone for previous positive covid result. Pt's caregiver @ bedside

## 2020-06-15 NOTE — ED PROVIDER NOTE - NSFOLLOWUPINSTRUCTIONS_ED_ALL_ED_FT
How to Care for Your Suprapubic Catheter    WHAT YOU NEED TO KNOW:    A suprapubic catheter is a tube that drains urine from your bladder. It is inserted through a small hole in your lower abdomen and into your bladder. Suprapubic means that the catheter is inserted above your pubic bone. You may need a catheter because you have problems urinating because of a medical condition or surgery. A suprapubic catheter is also called an indwelling urinary catheter.     DISCHARGE INSTRUCTIONS:    Call your doctor if:     You have a fever.       You have changes in how your urine looks or smells, or you have blood in your urine.      You have an overgrowth of skin at the insertion site that is getting larger.      The closed drainage system has accidently come open or apart.       Your catheter keeps getting blocked.      There is less urine than usual or no urine draining into the drainage bag.      The catheter comes out.       Your insertion site is red, smells bad, or has green or yellow discharge.      You have pain in your hip, back, pelvis, or lower abdomen.      You are confused or have other changes in the way that you think.      You have questions or concerns about how to care for your catheter.    Why catheter care is important: An infection can develop when bacteria get inside the catheter or drainage system. This can happen when the urine bag is changed or when a urine sample is collected. You can get an infection if you do not wash your hands. You can also get an infection if the catheter equipment is not cleaned properly. Urinary catheter-based infections can lead to serious illness. The following can help prevent infection:     Care for your catheter as directed. Follow directions on how to clean and care for the catheter, insertion site, and drainage bag. Keep the catheter drainage system closed. Keep the catheter tube secured to your leg so it will drain well.      Drink liquids as directed. Ask how much liquid to drink each day and which liquids are best for you. Good choices for most people include water, juice, and milk. Coffee, soup, and fruit may be counted in your daily liquid amount.      Wash your hands often. Always wash with soap and water before and after you touch your catheter, tubing, or drainage bag. Wear clean medical gloves when you care for your catheter or disconnect the drainage bag. This will help stop germs from getting into your catheter. Remind anyone who cares for your catheter or drainage system to wash his or her hands.Handwashing         Care for your drainage bag:     Always wash your hands before and after you touch the catheter or the insertion site. Wear clean medical gloves when you care for your catheter.      Position the drainage bag and tubing:   Allow gravity drainage. Do not loop or kink the tubing so urine can flow into the bag.      Position the drainage bag properly. Keep the drainage bag below the level of your waist. This helps stop urine from moving back up the tubing and into your bladder.      Keep the bag off the floor. This will help prevent contamination.      Empty the drainage bag when needed. The weight of a full drainage bag can pull on the catheter and cause pain. Empty the drainage bag every 3 to 6 hours or when it is ½ to ? full.   Place a large container on the floor next to your chair. You may also hold the urine bag over the toilet.      Remove the drain spout from its sleeve at the bottom of the urine bag. Do not touch the tip. Open the slide valve on the spout.      Let the urine flow out of the urine bag into the container or toilet. Do not let the drainage tube touch anything.      Clean the end of the drain spout when the bag is empty. Ask your healthcare provider which cleaning solution is best to use.       Close the slide valve and put the drain spout into its sleeve at the bottom of the urine bag. Write down how much urine was in your bag if your healthcare provider has asked you to keep a record.      Clean and change the drainage bag as directed. Ask your healthcare provider how often you should change the drainage bag. You may buy a solution to clean the drainage bag. You may also make a solution with tap water and household bleach or vinegar. Wear medical gloves if you need to disconnect the tubing. Do not allow the end of the catheter or tubing to touch anything. Clean the ends with a new alcohol pad or as directed by your healthcare provider before you reconnect them.    Other tips:     Wash your genital area and the insertion site with soap and water 2 times a day. Wash your anal area with soap and water after each bowel movement.      You may need to use the larger drainage bag at night. A leg bag can be used during the day. A leg bag usually fits under clothing.     Follow up with your doctor as directed: Write down your questions so you remember to ask them during your visits.        © Copyright [employer] How to Care for Your Suprapubic Catheter    WHAT YOU NEED TO KNOW:    A suprapubic catheter is a tube that drains urine from your bladder. It is inserted through a small hole in your lower abdomen and into your bladder. Suprapubic means that the catheter is inserted above your pubic bone. You may need a catheter because you have problems urinating because of a medical condition or surgery. A suprapubic catheter is also called an indwelling urinary catheter.     DISCHARGE INSTRUCTIONS:    Call your doctor if:   - You have a fever.   - You have changes in how your urine looks or smells, or you have blood in your urine.  - You have an overgrowth of skin at the insertion site that is getting larger.  - The closed drainage system has accidently come open or apart.   - Your catheter keeps getting blocked.  - There is less urine than usual or no urine draining into the drainage bag.  - The catheter comes out.   - Your insertion site is red, smells bad, or has green or yellow discharge.  - You have pain in your hip, back, pelvis, or lower abdomen.  - You are confused or have other changes in the way that you think.  - You have questions or concerns about how to care for your catheter.    Why catheter care is important: An infection can develop when bacteria get inside the catheter or drainage system. This can happen when the urine bag is changed or when a urine sample is collected. You can get an infection if you do not wash your hands. You can also get an infection if the catheter equipment is not cleaned properly. Urinary catheter-based infections can lead to serious illness. The following can help prevent infection:     Care for your catheter as directed. Follow directions on how to clean and care for the catheter, insertion site, and drainage bag. Keep the catheter drainage system closed. Keep the catheter tube secured to your leg so it will drain well.    Drink liquids as directed. Ask how much liquid to drink each day and which liquids are best for you. Good choices for most people include water, juice, and milk. Coffee, soup, and fruit may be counted in your daily liquid amount.    Wash your hands often. Always wash with soap and water before and after you touch your catheter, tubing, or drainage bag. Wear clean medical gloves when you care for your catheter or disconnect the drainage bag. This will help stop germs from getting into your catheter. Remind anyone who cares for your catheter or drainage system to wash his or her hands.Handwashing     Care for your drainage bag:     Always wash your hands before and after you touch the catheter or the insertion site. Wear clean medical gloves when you care for your catheter.    Position the drainage bag and tubing:   Allow gravity drainage. Do not loop or kink the tubing so urine can flow into the bag.    Position the drainage bag properly. Keep the drainage bag below the level of your waist. This helps stop urine from moving back up the tubing and into your bladder.    Keep the bag off the floor. This will help prevent contamination.    Empty the drainage bag when needed. The weight of a full drainage bag can pull on the catheter and cause pain. Empty the drainage bag every 3 to 6 hours or when it is ½ to ? full.   Place a large container on the floor next to your chair. You may also hold the urine bag over the toilet.    Remove the drain spout from its sleeve at the bottom of the urine bag. Do not touch the tip. Open the slide valve on the spout.    Let the urine flow out of the urine bag into the container or toilet. Do not let the drainage tube touch anything.    Clean the end of the drain spout when the bag is empty. Ask your healthcare provider which cleaning solution is best to use.     Close the slide valve and put the drain spout into its sleeve at the bottom of the urine bag. Write down how much urine was in your bag if your healthcare provider has asked you to keep a record.    Clean and change the drainage bag as directed. Ask your healthcare provider how often you should change the drainage bag. You may buy a solution to clean the drainage bag. You may also make a solution with tap water and household bleach or vinegar. Wear medical gloves if you need to disconnect the tubing. Do not allow the end of the catheter or tubing to touch anything. Clean the ends with a new alcohol pad or as directed by your healthcare provider before you reconnect them.    Other tips:     Wash your genital area and the insertion site with soap and water 2 times a day. Wash your anal area with soap and water after each bowel movement.    You may need to use the larger drainage bag at night. A leg bag can be used during the day. A leg bag usually fits under clothing.     Follow up with your doctor as directed: Write down your questions so you remember to ask them during your visits.     © Copyright [employer]

## 2020-06-15 NOTE — ED ADULT NURSE NOTE - OBJECTIVE STATEMENT
Pt's suprapubic catheter was being replaced by provider, difficulty placing the new catheter. Pt sent to ER and arrives with a open wound where the original suprapubic catheter was placed. Pt denies any SOB, headache, pain, cough, fever. Pt direct to hot zone for previous positive covid result. Pt's caregiver @ bedside. Pt arrives with a peg tube in place

## 2020-06-15 NOTE — CONSULT NOTE ADULT - SUBJECTIVE AND OBJECTIVE BOX
HPI: Pt is a 65y/o COVID+ M presenting with leakage from SPT. ED consulting to replace SPT. Pt nonverbal at baseline. Aide at bedside.     PAST MEDICAL & SURGICAL HISTORY:  Asthma  Urinary retention  Urinary calculi  Spastic quadriplegia  Osteoporosis  Seizure: last seizure &gt;10 years ago  Cerebral palsy  BPH (benign prostatic hyperplasia)  Suprapubic catheter  H/O cystoscopy  S/P percutaneous endoscopic gastrostomy (PEG) tube placement    REVIEW OF SYSTEMS:  unable to obtain ros 2/2 mental status    Allergies  No Known Allergies    SOCIAL HISTORY: No illicit drug use    FAMILY HISTORY:  Family history unknown    Vital Signs Last 24 Hrs  T(C): 36.9 (15 Kendrick 2020 20:04), Max: 37 (15 Kendrick 2020 19:46)  T(F): 98.4 (15 Kendrick 2020 20:04), Max: 98.6 (15 Kendrick 2020 19:46)  HR: 78 (15 Kendrick 2020 20:04) (78 - 86)  BP: 117/60 (15 Kendrick 2020 20:04) (117/60 - 118/66)  RR: 16 (15 Kendrick 2020 20:04) (16 - 18)  SpO2: 98% (15 Kendrick 2020 20:04) (94% - 98%)    PHYSICAL EXAM:  Constitutional: NAD, well-developed  HEENT: EOMI  Neck: no pain  Back: No CVA tenderness  Respiratory: No accessory respiratory muscle use  Abd: Soft, Nontender, +peg tube  : +spt site with mild erythema.  Extremities: no edema  Neurological: alert, nonverbal   Psychiatric: Normal mood, normal affect  Skin: No rashes    LABS:                      13.1   4.38  )-----------( 204      ( 15 Kendrick 2020 20:50 )             40.1

## 2020-06-16 VITALS
SYSTOLIC BLOOD PRESSURE: 105 MMHG | HEART RATE: 85 BPM | RESPIRATION RATE: 16 BRPM | OXYGEN SATURATION: 98 % | DIASTOLIC BLOOD PRESSURE: 72 MMHG

## 2020-06-16 PROCEDURE — 76770 US EXAM ABDO BACK WALL COMP: CPT | Mod: 26

## 2020-06-16 RX ORDER — OXYBUTYNIN CHLORIDE 5 MG
1 TABLET ORAL
Qty: 28 | Refills: 0
Start: 2020-06-16 | End: 2020-07-13

## 2020-06-16 NOTE — CHART NOTE - NSCHARTNOTEFT_GEN_A_CORE
PA     US shows SPT in place around decompressed bladder, spoke with radiologist to confirm. Recalled for no UO despite SPT in place.  Patient denies any pain or suprapubic tenderness, abdomen is nondistended. Irrigated at bedside with 80 cc of sterile water with no return, patient states "I feel like urinating". Patient found urinating out of penis in to diaper, no urine return in SPT noted.     Irrigated again with 40cc of SW and patient immediately voided from penis in tandem with irrigation, no return of urine noted     A) urine leakage 2/2 bladder spasm     Plan   - Start Ditropan XL 10 mg once a day for bladder spasm, if no history of glaucoma   - F/U OP with Dr. Cooper for further management  - Case d/w with ED attending

## 2020-06-16 NOTE — ED ADULT NURSE REASSESSMENT NOTE - NS ED NURSE REASSESS COMMENT FT1
Pt back from US, pt placed back on bedside heart monitor to monitor vital signs. Vital signs entered into chart. Call bell placed within reach of the pt along w call bell education and teaching. Pt updated on continuing plan of care. Pt verbalized an understanding of plan of care. Orders for urine still available, but unable to achieve due to no urinary output from suprapubic catheter placement. Awaiting further orders and US results. Will continue to monitor.

## 2020-06-16 NOTE — ED ADULT NURSE REASSESSMENT NOTE - NS ED NURSE REASSESS COMMENT FT1
Pt to be discharged back to group home. Pt's aid still not present. Due to pt's disability and no other method of transportation home, ambulette transportation back to pt's group home to be performed with collaboration of ED . Provider made aware. Pt still on cardiac monitor to monitor vital signs. Pt updated on continuing plan of care.

## 2020-06-16 NOTE — ED ADULT NURSE REASSESSMENT NOTE - NS ED NURSE REASSESS COMMENT FT1
ED provider called and notified of no urinary output, despite confirmation placement of suprapubic catheter by US. Pt's abdomen is not distended nor tender. Pt denies any pain. Pt states he "feels like he is urinating". Observed that pt is indeed urinating out of his penis, not via the suprapubic catheter. Pt rolled, cleaned, and changed. Provider made aware. Assessment findings indicate that pt is urinating via his penis and not via the catheter. Unable to determine urinary output. Provider and urology aware of all assessment findings. Awaiting further orders, will continue to monitor. Pt updated on continuing plan of care. Pt verbalized an understanding of continuing plan of care.

## 2020-06-17 ENCOUNTER — APPOINTMENT (OUTPATIENT)
Dept: UROLOGY | Facility: CLINIC | Age: 65
End: 2020-06-17
Payer: MEDICARE

## 2020-06-17 VITALS — HEIGHT: 65 IN | BODY MASS INDEX: 0.17 KG/M2 | TEMPERATURE: 98.7 F | WEIGHT: 1 LBS

## 2020-06-17 PROCEDURE — 51710 CHANGE OF BLADDER TUBE: CPT | Mod: 78

## 2020-06-17 PROCEDURE — 99214 OFFICE O/P EST MOD 30 MIN: CPT | Mod: 25

## 2020-06-17 NOTE — LETTER HEADER
[FreeTextEntry3] : Bonnie Cooper M.D.\par Director of Urology\par [facility]\par [address] [place] [address], [address]\[place] [address]

## 2020-06-17 NOTE — HISTORY OF PRESENT ILLNESS
[FreeTextEntry1] : Jenaro had a suprapubic to change last week before he left the hospital. He was taken to the emergency room on Monday were an ultrasound late Monday night show the catheter to be in place without any evidence of Hydro. He is brought in today is he’s continuing to leak from the penis.\par

## 2020-06-17 NOTE — ASSESSMENT
[FreeTextEntry1] : Please see catheter exchange note\par I tried to irrigate the catheter and, he ended up it would not irrigate. He had a 30 mL balloon. I deflated it and ended up only having 10 in a 30 mL balloon, but when I took out the 10 mL all of setting it irrigated easily. I changed it to a 26 with 5 mL balloon but it would not irrigate. I tried positioning it during irrigation irrigating it to him when I got to a spot where irrigated put in the fight cc and it then when I irrigate. I tried positioning up by just passing it in trying to move it after was inflated and the net result is I can only get drainage about 1/3 of the time and it wasn’t reliable.\par \par I discussed the case with Dr. Cooley who had been taking care of him in the home and he tells me he seen this before with the bladder fibrosis down and there’s really no room for the catheter the balloon and the urine. If that is the case he may need an augmenter we may need to get a special kind of catheter.\par \par I then discuss the case with Dr. Wilkes, and once he is Covid negative we can get him in for a cystogram possible change of catheter and see if that is the case. If that is the case we will see if the largest pigtail catheter that they have stays in and drains. If not he may indeed need a bladder augment.\par

## 2020-07-02 ENCOUNTER — APPOINTMENT (OUTPATIENT)
Dept: UROLOGY | Facility: CLINIC | Age: 65
End: 2020-07-02
Payer: MEDICARE

## 2020-07-02 PROCEDURE — 99215 OFFICE O/P EST HI 40 MIN: CPT | Mod: 24

## 2020-07-02 NOTE — PHYSICAL EXAM
[Abdomen Soft] : soft [Abdomen Tenderness] : non-tender [Costovertebral Angle Tenderness] : no ~M costovertebral angle tenderness [] : no respiratory distress [Edema] : no peripheral edema [Respiration, Rhythm And Depth] : normal respiratory rhythm and effort [Exaggerated Use Of Accessory Muscles For Inspiration] : no accessory muscle use [Oriented To Time, Place, And Person] : oriented to person, place, and time [Affect] : the affect was normal [Not Anxious] : not anxious [Mood] : the mood was normal [FreeTextEntry1] : ambulating in wheel chair

## 2020-07-02 NOTE — LETTER BODY
[Dear  ___] : Dear  [unfilled], [Please see my note below.] : Please see my note below. [Courtesy Letter:] : I had the pleasure of seeing your patient, [unfilled], in my office today. [Sincerely,] : Sincerely, [FreeTextEntry2] : Dr. Brigido Rao

## 2020-07-02 NOTE — ASSESSMENT
[FreeTextEntry1] : We initially thought based on the wording from the radiologist that he has a fibrotic bladder and given the absence of room his catheter is not draining appropriately. We discussed either doing and augmentation or an ileal conduit and after discussion with Dr. Timmons we decided that he will present in 2 weeks with his sister in communication either at the same time or before this for further discussion regarding cystoplasty versus ileal conduit or other options with both myself and . At that time we were going to change his tube at the same time.\par For now we are going to try and contact them to clean out his bowels. If the catheter starts working great if not he’s coming back in two weeks for catheter change, we will cancel the appointment with Dr. Timmons as he does not need an augment and if the catheter doesn’t work well I may put a flexible cystoscope in and see if there’s something in the bladder that may be interfering with the eyelets. At least on the CT it does not look like is filled with clutter or anything else I could explain this. Right now all I know is that the bladder is of sufficient size and distended, the tube is in proper place, he is full of stool and we will handle these one at a time.\par

## 2020-07-02 NOTE — HISTORY OF PRESENT ILLNESS
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 Recently he has been having difficulty with suprapubic placement as if the balloon was completely inflated they found that it would only drain intermittently with urine coming around the ostomy and through the penis. His catheter was changed on 6/17 and at that time we had difficulty infusing normal saline as it would leak around the catheter or to the penis. We thought that he might have a fibrotic bladder secondary to long-term suprapubic tube drainage.. He presents today to review his CT cystogram and to discuss options

## 2020-07-02 NOTE — LETTER HEADER
[FreeTextEntry3] : Bonnie Cooper M.D.\par Director of Urology\par [facility]\par [address] [place] [address], [address]\[place] [address]

## 2020-07-16 ENCOUNTER — APPOINTMENT (OUTPATIENT)
Dept: UROLOGY | Facility: CLINIC | Age: 65
End: 2020-07-16
Payer: MEDICARE

## 2020-07-16 VITALS — TEMPERATURE: 98.2 F

## 2020-07-16 DIAGNOSIS — T83.9XXA UNSPECIFIED COMPLICATION OF GENITOURINARY PROSTHETIC DEVICE, IMPLANT AND GRAFT, INITIAL ENCOUNTER: ICD-10-CM

## 2020-07-16 PROCEDURE — 52000 CYSTOURETHROSCOPY: CPT | Mod: 22,78

## 2020-07-16 PROCEDURE — 99214 OFFICE O/P EST MOD 30 MIN: CPT | Mod: 25

## 2020-07-17 NOTE — REVIEW OF SYSTEMS
[see HPI] : see HPI [Negative] : Psychiatric [FreeTextEntry1] : CP in a wheelchair with multiple deformities

## 2020-07-17 NOTE — LETTER BODY
[Please see my note below.] : Please see my note below. [Courtesy Letter:] : I had the pleasure of seeing your patient, [unfilled], in my office today. [Dear  ___] : Dear  [unfilled], [Sincerely,] : Sincerely, [FreeTextEntry2] : Dr. Brigido Rao

## 2020-07-17 NOTE — LETTER HEADER
[FreeTextEntry3] : Bonnie Cooper M.D.\par Director of Urology\par [facility]\par [address] [place] [address], [address]\[place] [address]

## 2020-07-17 NOTE — ASSESSMENT
[FreeTextEntry1] : cystoscopy of his ostomy demonstrated debris in the bladder and it may be that the catheter has to be in further, As there was a little shelf which I'm assuming is the space within the abdominal wall. We also scoped the bladder through the urethra and saw that he had a patent incompetent bladder neck. As long as his penis was clamped we were able to flush his Edouard. If it was not clamped the saline came out of the urethra. Please see separate note regarding this. Finally when we attempted to look from below we cannot get the Cornelia through the urethra as he has a bulbous urethral stricture that would be easy to cut open but unlikely to stay open.\par \par His catheter was placed and irrigated he will continue with the diaper and a Edouard. We have scheduled followup in 4 weeks for catheter change

## 2020-07-17 NOTE — HISTORY OF PRESENT ILLNESS
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 Recently he has been having difficulty with suprapubic placement with urine draining intermittently and mostly coming through the penis and some around the ostomy. His catheter was changed on 6/17 and at that time we had difficulty infusing normal saline as it would leak out through the penis. He was sent for a CT cystogram which showed normal bladder and tube placement and a colon filled with stool. He has been managed on laxatives and enemas since.\par He presents today for cystoscopy of his ostomy to see what may be the problem, please see separate note\par

## 2020-07-17 NOTE — PHYSICAL EXAM
[General Appearance - Well Nourished] : well nourished [General Appearance - In No Acute Distress] : no acute distress [Well Groomed] : well groomed [Normal Appearance] : normal appearance [Costovertebral Angle Tenderness] : no ~M costovertebral angle tenderness [Abdomen Soft] : soft [Abdomen Tenderness] : non-tender [Urinary Bladder Findings] : the bladder was normal on palpation [Penis Abnormality] : normal uncircumcised penis [Scrotum] : the scrotum was normal [Testes Tenderness] : no tenderness of the testes [Testes Mass (___cm)] : there were no testicular masses [Edema] : no peripheral edema [] : no respiratory distress [Respiration, Rhythm And Depth] : normal respiratory rhythm and effort [Exaggerated Use Of Accessory Muscles For Inspiration] : no accessory muscle use [Oriented To Time, Place, And Person] : oriented to person, place, and time [Affect] : the affect was normal [Mood] : the mood was normal [Normal Station and Gait] : the gait and station were normal for the patient's age [Not Anxious] : not anxious [Femoral Lymph Nodes Enlarged Bilaterally] : femoral [No Focal Deficits] : no focal deficits [Inguinal Lymph Nodes Enlarged Bilaterally] : inguinal [FreeTextEntry1] : 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

## 2020-07-30 ENCOUNTER — APPOINTMENT (OUTPATIENT)
Dept: UROLOGY | Facility: CLINIC | Age: 65
End: 2020-07-30
Payer: MEDICARE

## 2020-07-30 PROCEDURE — 51710 CHANGE OF BLADDER TUBE: CPT | Mod: 78

## 2020-07-31 NOTE — PHYSICAL EXAM
[General Appearance - Well Developed] : well developed [General Appearance - Well Nourished] : well nourished [Normal Appearance] : normal appearance [Well Groomed] : well groomed [General Appearance - In No Acute Distress] : no acute distress [Abdomen Soft] : soft [Abdomen Tenderness] : non-tender [Costovertebral Angle Tenderness] : no ~M costovertebral angle tenderness [] : no respiratory distress [Edema] : no peripheral edema [Respiration, Rhythm And Depth] : normal respiratory rhythm and effort [Exaggerated Use Of Accessory Muscles For Inspiration] : no accessory muscle use [Oriented To Time, Place, And Person] : oriented to person, place, and time [Affect] : the affect was normal [Not Anxious] : not anxious [Mood] : the mood was normal [No Focal Deficits] : no focal deficits [No Palpable Adenopathy] : no palpable adenopathy [FreeTextEntry1] : wheelchair bound

## 2020-07-31 NOTE — HISTORY OF PRESENT ILLNESS
[FreeTextEntry1] : 65 yo male with a history  of SPT malfunction following a Edouard malfunction s/p PCNL and has chronic SPT catheter changes. Presents to office accompanied by aide for SPT malfunction, state urine is draining in the diaper as well as drainage bag. Urine in drainage bag and also intermittently leaking in Adult diaper. Denies gross hematuria, fever, and chills. SPT last changed on 7/16/2020 in office and cystoscopy of his ostomy done which showed debris in the bladder. 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 If it was not clamped the saline came out of the urethra.

## 2020-07-31 NOTE — ADDENDUM
[FreeTextEntry1] : Documented by Nakita Traore NP acting as a scribe for Dr. Zaid Timmons \par \par All medical record entries made by the Scribe were at my,  direction and\par personally dictated by me.  I have reviewed the chart and agree that the record\par accurately reflects my personal performance of the history, physical exam, procedure and imaging.  \par \par

## 2020-07-31 NOTE — ASSESSMENT
[FreeTextEntry1] : 63 yo male with hx of chronic SPT catheter changes\par \par -AIde instructed on catheter care\par -Patient will continue with diaper and alatorre \par -F/U for SPT change\par -F/U with Dr. Cooley to discuss treatment

## 2020-08-05 ENCOUNTER — APPOINTMENT (OUTPATIENT)
Dept: UROLOGY | Facility: CLINIC | Age: 65
End: 2020-08-05

## 2020-08-07 ENCOUNTER — APPOINTMENT (OUTPATIENT)
Dept: UROLOGY | Facility: CLINIC | Age: 65
End: 2020-08-07
Payer: MEDICARE

## 2020-08-07 PROCEDURE — 99214 OFFICE O/P EST MOD 30 MIN: CPT | Mod: 24

## 2020-08-07 NOTE — PHYSICAL EXAM
[General Appearance - Well Developed] : well developed [Normal Appearance] : normal appearance [General Appearance - In No Acute Distress] : no acute distress [Abdomen Soft] : soft [Abdomen Tenderness] : non-tender [Costovertebral Angle Tenderness] : no ~M costovertebral angle tenderness [Edema] : no peripheral edema [] : no respiratory distress [Respiration, Rhythm And Depth] : normal respiratory rhythm and effort [Oriented To Time, Place, And Person] : oriented to person, place, and time [Exaggerated Use Of Accessory Muscles For Inspiration] : no accessory muscle use [Mood] : the mood was normal [Affect] : the affect was normal [Not Anxious] : not anxious [No Focal Deficits] : no focal deficits [No Palpable Adenopathy] : no palpable adenopathy [Skin Color & Pigmentation] : normal skin color and pigmentation [FreeTextEntry1] : wheelchair bound

## 2020-08-07 NOTE — ASSESSMENT
[FreeTextEntry1] : 1.  chronic SPT catheter changes\par 2. chronic urinary rtrntion\par 3. Mechanical urinary retention --open BN \par \par -AIde instructed on catheter care\par -Patient will continue with diaper and alatorre \par -F/U for SPT change -3 eeeks\par -Discussed options to prevent urinary incontinence[ via penis ] which include Anton clamp during the day or open bladder neck reconstruction. This would be performed by specialty urologic surgeons under general anesthesia. We discussed the pros, cons, risks, success, and failures of this procedure.\par I've asked the aid to have [facility] proxy family member contact me to understand the above alternatives. I have provided a brief written summary to the aid for the group home and family to read and understand.\par Return for SPC change approximately 3 week

## 2020-08-07 NOTE — HISTORY OF PRESENT ILLNESS
[FreeTextEntry1] : 65 yo male with a history  of SPT malfunction following a Edouard malfunction s/p PCNL and has chronic SPT catheter changes. Presents to office accompanied by aide for SPT malfunction, state urine is draining in the diaper as well as drainage bag. Urine in drainage bag and also intermittently leaking in Adult diaper. Denies gross hematuria, fever, and chills. SPT last changed on 7/16/2020 in office and cystoscopy of his ostomy done which showed debris in the bladder. 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 If it was not clamped the saline came out of the urethra === suggesting open BN.\par

## 2020-08-07 NOTE — ED ADULT NURSE NOTE - NS ED NURSE LEVEL OF CONSCIOUSNESS MENTAL STATUS
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

## 2020-09-01 ENCOUNTER — OUTPATIENT (OUTPATIENT)
Dept: OUTPATIENT SERVICES | Facility: HOSPITAL | Age: 65
LOS: 1 days | Discharge: HOME | End: 2020-09-01

## 2020-09-01 ENCOUNTER — APPOINTMENT (OUTPATIENT)
Dept: NEPHROLOGY | Facility: CLINIC | Age: 65
End: 2020-09-01
Payer: MEDICARE

## 2020-09-01 VITALS — SYSTOLIC BLOOD PRESSURE: 107 MMHG | HEART RATE: 66 BPM | DIASTOLIC BLOOD PRESSURE: 65 MMHG

## 2020-09-01 DIAGNOSIS — Z93.1 GASTROSTOMY STATUS: Chronic | ICD-10-CM

## 2020-09-01 DIAGNOSIS — Z93.59 OTHER CYSTOSTOMY STATUS: Chronic | ICD-10-CM

## 2020-09-01 DIAGNOSIS — Z98.890 OTHER SPECIFIED POSTPROCEDURAL STATES: Chronic | ICD-10-CM

## 2020-09-01 PROCEDURE — 99214 OFFICE O/P EST MOD 30 MIN: CPT | Mod: GC

## 2020-09-01 NOTE — ASSESSMENT
[FreeTextEntry1] : 62 y/o M w/ PMhx of CP (wheelchair bound), s/p PEG, s/p suprapubic catheter d/t urethral strictures here for follow up for nephrolithiasis after 24 h urine collection. Patient is s/p kidney stone removal in June and had Kidney Bladder US which was negative. Patient is following Urology for a leaking suprapubic catheter and will get it fixed over the month. \par  \par #nephrolithiasis\par - CMP 11/2019 WNL\par - 11/2019 CTAP: no hydronephrosis, punctate nonobstructing R upper pole calculus\par - C/w potassium citrate, will repeat potassium citrate 24h urine prior to next visit, unable to do so last visit due to leaking suprapubic catheter\par - Renal US was negative for any hydronephrosis or kidney stones\par - advise drinking a lot of water\par - f/u in 6 months

## 2020-09-01 NOTE — HISTORY OF PRESENT ILLNESS
[FreeTextEntry1] : 63 y/o M w/ PMhx of CP (wheelchair bound), s/p PEG, s/p suprapubic catheter d/t urethral strictures here for follow up for nephrolithiasis. Patient had his kidney stone removed back in June and is now here for f/u. Patient had a kidney bladder US last week. Patient is still having issues with the leaking suprapubic catheter and is f/u with urology for it. Overall patient feels well, denies any back pain, hematuria, chest pain, muscle pain, weakness.

## 2020-09-02 ENCOUNTER — APPOINTMENT (OUTPATIENT)
Dept: UROLOGY | Facility: CLINIC | Age: 65
End: 2020-09-02
Payer: MEDICARE

## 2020-09-02 VITALS
SYSTOLIC BLOOD PRESSURE: 99 MMHG | BODY MASS INDEX: 25.83 KG/M2 | WEIGHT: 155 LBS | DIASTOLIC BLOOD PRESSURE: 65 MMHG | HEIGHT: 65 IN | TEMPERATURE: 97.2 F

## 2020-09-02 PROCEDURE — 99213 OFFICE O/P EST LOW 20 MIN: CPT

## 2020-09-08 ENCOUNTER — INPATIENT (INPATIENT)
Facility: HOSPITAL | Age: 65
LOS: 6 days | Discharge: NURSING, REHAB OR LONG-TERM CARE | End: 2020-09-15
Attending: INTERNAL MEDICINE | Admitting: INTERNAL MEDICINE
Payer: MEDICARE

## 2020-09-08 VITALS
RESPIRATION RATE: 24 BRPM | TEMPERATURE: 103 F | SYSTOLIC BLOOD PRESSURE: 102 MMHG | OXYGEN SATURATION: 94 % | DIASTOLIC BLOOD PRESSURE: 61 MMHG | HEART RATE: 118 BPM

## 2020-09-08 DIAGNOSIS — Z93.1 GASTROSTOMY STATUS: Chronic | ICD-10-CM

## 2020-09-08 DIAGNOSIS — Z98.890 OTHER SPECIFIED POSTPROCEDURAL STATES: Chronic | ICD-10-CM

## 2020-09-08 DIAGNOSIS — Z93.59 OTHER CYSTOSTOMY STATUS: Chronic | ICD-10-CM

## 2020-09-08 LAB
ALBUMIN SERPL ELPH-MCNC: 3.2 G/DL — LOW (ref 3.5–5.2)
ALP SERPL-CCNC: 101 U/L — SIGNIFICANT CHANGE UP (ref 30–115)
ALT FLD-CCNC: 34 U/L — SIGNIFICANT CHANGE UP (ref 0–41)
ANION GAP SERPL CALC-SCNC: 11 MMOL/L — SIGNIFICANT CHANGE UP (ref 7–14)
APPEARANCE UR: ABNORMAL
AST SERPL-CCNC: 42 U/L — HIGH (ref 0–41)
BACTERIA # UR AUTO: ABNORMAL
BASE EXCESS BLDV CALC-SCNC: 4.7 MMOL/L — HIGH (ref -2–2)
BASOPHILS # BLD AUTO: 0.02 K/UL — SIGNIFICANT CHANGE UP (ref 0–0.2)
BASOPHILS NFR BLD AUTO: 0.2 % — SIGNIFICANT CHANGE UP (ref 0–1)
BILIRUB SERPL-MCNC: 0.4 MG/DL — SIGNIFICANT CHANGE UP (ref 0.2–1.2)
BILIRUB UR-MCNC: NEGATIVE — SIGNIFICANT CHANGE UP
BUN SERPL-MCNC: 27 MG/DL — HIGH (ref 10–20)
CA-I SERPL-SCNC: 1.15 MMOL/L — SIGNIFICANT CHANGE UP (ref 1.12–1.3)
CALCIUM SERPL-MCNC: 8.7 MG/DL — SIGNIFICANT CHANGE UP (ref 8.5–10.1)
CHLORIDE SERPL-SCNC: 102 MMOL/L — SIGNIFICANT CHANGE UP (ref 98–110)
CO2 SERPL-SCNC: 25 MMOL/L — SIGNIFICANT CHANGE UP (ref 17–32)
COLOR SPEC: ABNORMAL
CREAT SERPL-MCNC: 0.7 MG/DL — SIGNIFICANT CHANGE UP (ref 0.7–1.5)
DIFF PNL FLD: ABNORMAL
EOSINOPHIL # BLD AUTO: 0 K/UL — SIGNIFICANT CHANGE UP (ref 0–0.7)
EOSINOPHIL NFR BLD AUTO: 0 % — SIGNIFICANT CHANGE UP (ref 0–8)
EPI CELLS # UR: 2 /HPF — SIGNIFICANT CHANGE UP (ref 0–5)
GAS PNL BLDV: 140 MMOL/L — SIGNIFICANT CHANGE UP (ref 136–145)
GAS PNL BLDV: SIGNIFICANT CHANGE UP
GLUCOSE SERPL-MCNC: 177 MG/DL — HIGH (ref 70–99)
GLUCOSE UR QL: NEGATIVE — SIGNIFICANT CHANGE UP
HCO3 BLDV-SCNC: 27 MMOL/L — SIGNIFICANT CHANGE UP (ref 22–29)
HCT VFR BLD CALC: 35 % — LOW (ref 42–52)
HCT VFR BLDA CALC: 38.2 % — SIGNIFICANT CHANGE UP (ref 34–44)
HGB BLD CALC-MCNC: 12.5 G/DL — LOW (ref 14–18)
HGB BLD-MCNC: 11.5 G/DL — LOW (ref 14–18)
HYALINE CASTS # UR AUTO: 24 /LPF — HIGH (ref 0–7)
IMM GRANULOCYTES NFR BLD AUTO: 0.7 % — HIGH (ref 0.1–0.3)
KETONES UR-MCNC: NEGATIVE — SIGNIFICANT CHANGE UP
LACTATE BLDV-MCNC: 2.7 MMOL/L — HIGH (ref 0.5–1.6)
LACTATE SERPL-SCNC: 3.1 MMOL/L — HIGH (ref 0.7–2)
LEUKOCYTE ESTERASE UR-ACNC: ABNORMAL
LYMPHOCYTES # BLD AUTO: 0.37 K/UL — LOW (ref 1.2–3.4)
LYMPHOCYTES # BLD AUTO: 4.1 % — LOW (ref 20.5–51.1)
MCHC RBC-ENTMCNC: 30.7 PG — SIGNIFICANT CHANGE UP (ref 27–31)
MCHC RBC-ENTMCNC: 32.9 G/DL — SIGNIFICANT CHANGE UP (ref 32–37)
MCV RBC AUTO: 93.6 FL — SIGNIFICANT CHANGE UP (ref 80–94)
MONOCYTES # BLD AUTO: 0.88 K/UL — HIGH (ref 0.1–0.6)
MONOCYTES NFR BLD AUTO: 9.8 % — HIGH (ref 1.7–9.3)
NEUTROPHILS # BLD AUTO: 7.61 K/UL — HIGH (ref 1.4–6.5)
NEUTROPHILS NFR BLD AUTO: 85.2 % — HIGH (ref 42.2–75.2)
NITRITE UR-MCNC: NEGATIVE — SIGNIFICANT CHANGE UP
NRBC # BLD: 0 /100 WBCS — SIGNIFICANT CHANGE UP (ref 0–0)
NT-PROBNP SERPL-SCNC: 450 PG/ML — HIGH (ref 0–300)
PCO2 BLDV: 33 MMHG — LOW (ref 41–51)
PH BLDV: 7.53 — HIGH (ref 7.26–7.43)
PH UR: 8.5 — HIGH (ref 5–8)
PLATELET # BLD AUTO: 112 K/UL — LOW (ref 130–400)
PO2 BLDV: 64 MMHG — HIGH (ref 20–40)
POTASSIUM BLDV-SCNC: 4 MMOL/L — SIGNIFICANT CHANGE UP (ref 3.3–5.6)
POTASSIUM SERPL-MCNC: 4.3 MMOL/L — SIGNIFICANT CHANGE UP (ref 3.5–5)
POTASSIUM SERPL-SCNC: 4.3 MMOL/L — SIGNIFICANT CHANGE UP (ref 3.5–5)
PROT SERPL-MCNC: 6.6 G/DL — SIGNIFICANT CHANGE UP (ref 6–8)
PROT UR-MCNC: ABNORMAL
RBC # BLD: 3.74 M/UL — LOW (ref 4.7–6.1)
RBC # FLD: 13.3 % — SIGNIFICANT CHANGE UP (ref 11.5–14.5)
RBC CASTS # UR COMP ASSIST: 4 /HPF — SIGNIFICANT CHANGE UP (ref 0–4)
SAO2 % BLDV: 94 % — SIGNIFICANT CHANGE UP
SARS-COV-2 RNA SPEC QL NAA+PROBE: SIGNIFICANT CHANGE UP
SODIUM SERPL-SCNC: 138 MMOL/L — SIGNIFICANT CHANGE UP (ref 135–146)
SP GR SPEC: 1.02 — SIGNIFICANT CHANGE UP (ref 1.01–1.02)
UROBILINOGEN FLD QL: SIGNIFICANT CHANGE UP
WBC # BLD: 8.94 K/UL — SIGNIFICANT CHANGE UP (ref 4.8–10.8)
WBC # FLD AUTO: 8.94 K/UL — SIGNIFICANT CHANGE UP (ref 4.8–10.8)
WBC UR QL: 90 /HPF — HIGH (ref 0–5)

## 2020-09-08 PROCEDURE — 93010 ELECTROCARDIOGRAM REPORT: CPT

## 2020-09-08 PROCEDURE — 71045 X-RAY EXAM CHEST 1 VIEW: CPT | Mod: 26

## 2020-09-08 PROCEDURE — 99223 1ST HOSP IP/OBS HIGH 75: CPT

## 2020-09-08 PROCEDURE — 99285 EMERGENCY DEPT VISIT HI MDM: CPT | Mod: CS,GC

## 2020-09-08 RX ORDER — ALBUTEROL 90 UG/1
2 AEROSOL, METERED ORAL EVERY 6 HOURS
Refills: 0 | Status: DISCONTINUED | OUTPATIENT
Start: 2020-09-08 | End: 2020-09-09

## 2020-09-08 RX ORDER — OXYBUTYNIN CHLORIDE 5 MG
5 TABLET ORAL EVERY 12 HOURS
Refills: 0 | Status: DISCONTINUED | OUTPATIENT
Start: 2020-09-08 | End: 2020-09-09

## 2020-09-08 RX ORDER — POLYETHYLENE GLYCOL 3350 17 G/17G
17 POWDER, FOR SOLUTION ORAL DAILY
Refills: 0 | Status: DISCONTINUED | OUTPATIENT
Start: 2020-09-08 | End: 2020-09-15

## 2020-09-08 RX ORDER — OXYBUTYNIN CHLORIDE 5 MG
10 TABLET ORAL DAILY
Refills: 0 | Status: DISCONTINUED | OUTPATIENT
Start: 2020-09-08 | End: 2020-09-08

## 2020-09-08 RX ORDER — SODIUM CHLORIDE 9 MG/ML
1000 INJECTION INTRAMUSCULAR; INTRAVENOUS; SUBCUTANEOUS ONCE
Refills: 0 | Status: COMPLETED | OUTPATIENT
Start: 2020-09-08 | End: 2020-09-08

## 2020-09-08 RX ORDER — VANCOMYCIN HCL 1 G
1500 VIAL (EA) INTRAVENOUS ONCE
Refills: 0 | Status: COMPLETED | OUTPATIENT
Start: 2020-09-08 | End: 2020-09-08

## 2020-09-08 RX ORDER — SENNA PLUS 8.6 MG/1
2 TABLET ORAL AT BEDTIME
Refills: 0 | Status: DISCONTINUED | OUTPATIENT
Start: 2020-09-08 | End: 2020-09-09

## 2020-09-08 RX ORDER — CEFEPIME 1 G/1
2000 INJECTION, POWDER, FOR SOLUTION INTRAMUSCULAR; INTRAVENOUS ONCE
Refills: 0 | Status: COMPLETED | OUTPATIENT
Start: 2020-09-08 | End: 2020-09-08

## 2020-09-08 RX ORDER — SODIUM CHLORIDE 9 MG/ML
1000 INJECTION INTRAMUSCULAR; INTRAVENOUS; SUBCUTANEOUS
Refills: 0 | Status: DISCONTINUED | OUTPATIENT
Start: 2020-09-08 | End: 2020-09-08

## 2020-09-08 RX ORDER — SODIUM CHLORIDE 9 MG/ML
500 INJECTION, SOLUTION INTRAVENOUS ONCE
Refills: 0 | Status: COMPLETED | OUTPATIENT
Start: 2020-09-08 | End: 2020-09-08

## 2020-09-08 RX ORDER — VANCOMYCIN HCL 1 G
1500 VIAL (EA) INTRAVENOUS EVERY 12 HOURS
Refills: 0 | Status: DISCONTINUED | OUTPATIENT
Start: 2020-09-09 | End: 2020-09-09

## 2020-09-08 RX ORDER — GUAIFENESIN/DEXTROMETHORPHAN 600MG-30MG
5 TABLET, EXTENDED RELEASE 12 HR ORAL EVERY 6 HOURS
Refills: 0 | Status: DISCONTINUED | OUTPATIENT
Start: 2020-09-08 | End: 2020-09-09

## 2020-09-08 RX ORDER — MIDODRINE HYDROCHLORIDE 2.5 MG/1
10 TABLET ORAL ONCE
Refills: 0 | Status: COMPLETED | OUTPATIENT
Start: 2020-09-08 | End: 2020-09-08

## 2020-09-08 RX ORDER — CEFEPIME 1 G/1
2000 INJECTION, POWDER, FOR SOLUTION INTRAMUSCULAR; INTRAVENOUS EVERY 8 HOURS
Refills: 0 | Status: DISCONTINUED | OUTPATIENT
Start: 2020-09-08 | End: 2020-09-10

## 2020-09-08 RX ORDER — VANCOMYCIN HCL 1 G
VIAL (EA) INTRAVENOUS
Refills: 0 | Status: DISCONTINUED | OUTPATIENT
Start: 2020-09-08 | End: 2020-09-09

## 2020-09-08 RX ORDER — IPRATROPIUM/ALBUTEROL SULFATE 18-103MCG
3 AEROSOL WITH ADAPTER (GRAM) INHALATION
Refills: 0 | Status: COMPLETED | OUTPATIENT
Start: 2020-09-08 | End: 2020-09-08

## 2020-09-08 RX ORDER — IPRATROPIUM/ALBUTEROL SULFATE 18-103MCG
3 AEROSOL WITH ADAPTER (GRAM) INHALATION EVERY 6 HOURS
Refills: 0 | Status: COMPLETED | OUTPATIENT
Start: 2020-09-08 | End: 2020-09-09

## 2020-09-08 RX ORDER — SODIUM CHLORIDE 9 MG/ML
2000 INJECTION, SOLUTION INTRAVENOUS ONCE
Refills: 0 | Status: COMPLETED | OUTPATIENT
Start: 2020-09-08 | End: 2020-09-08

## 2020-09-08 RX ORDER — PHENOBARBITAL 60 MG
64.8 TABLET ORAL DAILY
Refills: 0 | Status: DISCONTINUED | OUTPATIENT
Start: 2020-09-09 | End: 2020-09-09

## 2020-09-08 RX ORDER — CALCIUM CARBONATE 500(1250)
1 TABLET ORAL
Refills: 0 | Status: DISCONTINUED | OUTPATIENT
Start: 2020-09-08 | End: 2020-09-09

## 2020-09-08 RX ORDER — ACETAMINOPHEN 500 MG
975 TABLET ORAL ONCE
Refills: 0 | Status: COMPLETED | OUTPATIENT
Start: 2020-09-08 | End: 2020-09-08

## 2020-09-08 RX ORDER — ENOXAPARIN SODIUM 100 MG/ML
40 INJECTION SUBCUTANEOUS AT BEDTIME
Refills: 0 | Status: DISCONTINUED | OUTPATIENT
Start: 2020-09-08 | End: 2020-09-15

## 2020-09-08 RX ORDER — BACLOFEN 100 %
10 POWDER (GRAM) MISCELLANEOUS THREE TIMES A DAY
Refills: 0 | Status: DISCONTINUED | OUTPATIENT
Start: 2020-09-08 | End: 2020-09-09

## 2020-09-08 RX ADMIN — Medication 3 MILLILITER(S): at 12:50

## 2020-09-08 RX ADMIN — Medication 975 MILLIGRAM(S): at 12:53

## 2020-09-08 RX ADMIN — Medication 10 MILLIGRAM(S): at 22:59

## 2020-09-08 RX ADMIN — CEFEPIME 100 MILLIGRAM(S): 1 INJECTION, POWDER, FOR SOLUTION INTRAMUSCULAR; INTRAVENOUS at 12:52

## 2020-09-08 RX ADMIN — Medication 3 MILLILITER(S): at 19:10

## 2020-09-08 RX ADMIN — CEFEPIME 100 MILLIGRAM(S): 1 INJECTION, POWDER, FOR SOLUTION INTRAMUSCULAR; INTRAVENOUS at 22:58

## 2020-09-08 RX ADMIN — Medication 1 TABLET(S): at 17:11

## 2020-09-08 RX ADMIN — SODIUM CHLORIDE 2000 MILLILITER(S): 9 INJECTION, SOLUTION INTRAVENOUS at 12:53

## 2020-09-08 RX ADMIN — Medication 975 MILLIGRAM(S): at 20:22

## 2020-09-08 RX ADMIN — Medication 100 MILLIGRAM(S): at 22:59

## 2020-09-08 RX ADMIN — Medication 5 MILLILITER(S): at 17:11

## 2020-09-08 RX ADMIN — Medication 5 MILLIGRAM(S): at 19:10

## 2020-09-08 RX ADMIN — SODIUM CHLORIDE 3000 MILLILITER(S): 9 INJECTION, SOLUTION INTRAVENOUS at 18:01

## 2020-09-08 RX ADMIN — MIDODRINE HYDROCHLORIDE 10 MILLIGRAM(S): 2.5 TABLET ORAL at 19:10

## 2020-09-08 RX ADMIN — Medication 3 MILLILITER(S): at 12:51

## 2020-09-08 RX ADMIN — SENNA PLUS 2 TABLET(S): 8.6 TABLET ORAL at 22:59

## 2020-09-08 RX ADMIN — Medication 3 MILLILITER(S): at 12:42

## 2020-09-08 RX ADMIN — SODIUM CHLORIDE 2000 MILLILITER(S): 9 INJECTION INTRAMUSCULAR; INTRAVENOUS; SUBCUTANEOUS at 15:38

## 2020-09-08 RX ADMIN — Medication 300 MILLIGRAM(S): at 17:11

## 2020-09-08 RX ADMIN — Medication 1 DROP(S): at 19:10

## 2020-09-08 RX ADMIN — SODIUM CHLORIDE 3000 MILLILITER(S): 9 INJECTION, SOLUTION INTRAVENOUS at 23:03

## 2020-09-08 RX ADMIN — ENOXAPARIN SODIUM 40 MILLIGRAM(S): 100 INJECTION SUBCUTANEOUS at 22:59

## 2020-09-08 NOTE — H&P ADULT - NSHPPHYSICALEXAM_GEN_ALL_CORE
General: NAD, NC in place, Comfortable    Heart: S1/S2 appreciated, RRR, no murmurs     Lungs: Rhonchi b/l with Expiratory wheezing b/l, No accessory  muscle use    Abdomen; Protuberant  but  Soft, NT, +BS     Musculoskeletal: Atraumatic, Spastic b/l with hyperextension, No LE edema b/l, no skin color changes     Neuro: AO x 1, no focal deficits

## 2020-09-08 NOTE — ED PROVIDER NOTE - NS ED ROS FT
Eyes:  No visual changes, eye pain or discharge.  ENMT:  No hearing changes, pain, no sore throat or runny nose, no difficulty swallowing  Cardiac:  No chest pain, SOB or edema. No chest pain with exertion.  Respiratory:  Endorses cough and sob.   GI:  No nausea, vomiting, diarrhea or abdominal pain.  :  No dysuria, frequency or burning.  MS:  No myalgia, muscle weakness, joint pain or back pain.  Neuro:  No headache or weakness.  No LOC.  Skin:  No skin rash.   Endocrine: No history of thyroid disease or diabetes.

## 2020-09-08 NOTE — ED PROVIDER NOTE - PHYSICAL EXAMINATION
CONSTITUTIONAL: Well-developed; well-nourished; in no acute distress.   SKIN: warm, dry  HEAD: Normocephalic; atraumatic.  EYES: PERRL, EOMI, no conjunctival erythema  ENT: No nasal discharge; airway clear.  NECK: Supple; non tender.  CARD: S1, S2 normal; no murmurs, gallops, or rubs. Regular rate and rhythm.   RESP: No wheezes, rales or rhonchi.  ABD: soft ntnd  EXT: Normal ROM.  No clubbing, cyanosis or edema.   LYMPH: No acute cervical adenopathy.  NEURO: Alert, oriented, grossly unremarkable  PSYCH: Cooperative, appropriate. CONSTITUTIONAL: Well-developed; well-nourished; in no acute distress.   SKIN: warm, dry  HEAD: Normocephalic; atraumatic.  EYES: PERRL, EOMI, no conjunctival erythema  ENT: No nasal discharge; airway clear.  NECK: Supple; non tender.  CARD: S1, S2 normal; no murmurs, gallops, or rubs. Regular rate and rhythm.   RESP: Left lower lung field has crackles.   ABD: soft ntnd  EXT: Normal ROM.  No clubbing, cyanosis or edema.   LYMPH: No acute cervical adenopathy.  NEURO: Alert, oriented, grossly unremarkable  PSYCH: Cooperative, appropriate.

## 2020-09-08 NOTE — H&P ADULT - NSHPLABSRESULTS_GEN_ALL_CORE
LABS:                          11.5   8.94  )-----------( 112      ( 08 Sep 2020 12:40 )             35.0     09-08    138  |  102  |  27<H>  ----------------------------<  177<H>  4.3   |  25  |  0.7    Ca    8.7      08 Sep 2020 12:40    TPro  6.6  /  Alb  3.2<L>  /  TBili  0.4  /  DBili  x   /  AST  42<H>  /  ALT  34  /  AlkPhos  101  09-08         Lactate Trend: 2.7 ON vbg       CAPILLARY BLOOD GLUCOSE      RADIOLOGY: LLL OPACITY PER RADIOLOGIST       EKGS:  < from: 12 Lead ECG (09.08.20 @ 14:03) >    Diagnosis Line Sinus tachycardia  Nonspecific T wave abnormality  Abnormal ECG

## 2020-09-08 NOTE — ED PROVIDER NOTE - CLINICAL SUMMARY MEDICAL DECISION MAKING FREE TEXT BOX
64 yr old m that presents with cough, fever, and tacycardic. Pt noted to have a LLL PNA. Pt admitted to medicine for HCAP.

## 2020-09-08 NOTE — H&P ADULT - ASSESSMENT
Patient  is a  65 y/o m with Medical Hx  of cerebral palsy from group home, seizure disorder, spastic paraplegia, s/p PEG tube, BPH, bladder neck stricture s/p suprapubic cath with history of ESBL in urine, Asthma, from group home presented with Fever and Productive cough     Sepsis likely secondary to Healthcare associated Pneumonia  -Leukocytosis of 11K,  Tmax: 103F (rectal), tachycardic 118bpm and Tachypneic 24   -Presenting with Productive cough and fever   -ABG: Respiratory Alkalosis likely from Hyperventilation  -CXR: LLL opacity, on Exam:   -S/P Cefepime, Levaquin and 2L LR bolus in ED   -will start   -F/up Blood and Urine cx, Urine Legionella and Pneumococcal  antigen     Thrombocytopenia  Likely Reactive secondary to Active Infection  -Baseline around low 200's   -Monitor for now     Seizure Disorder   -Last Seizure   -c/w Phenobarbital     Asthma     DVT PPX: Lovenox   GI PPX  Full Code  Dispo: from Group Home  -Pending clinical improvement Patient  is a  63 y/o m with Medical Hx  of cerebral palsy from group home, seizure disorder, spastic paraplegia, s/p PEG tube, BPH, bladder neck stricture s/p suprapubic cath with history of ESBL in urine, Asthma, from group home presented with Fever and Productive cough     Sepsis likely secondary to Healthcare associated Pneumonia  -Leukocytosis of 11K,  Tmax: 103F (rectal), tachycardic 118bpm and Tachypneic 24, lactate 2.7 on VBG  -Presenting with Productive cough and fever, saturating well on RA   -ABG: Respiratory Alkalosis likely from Hyperventilation  -CXR: LLL opacity, on Exam: Rhonchi b/l with Expiratory wheezing b/l, No accessory  muscle use   -S/P Cefepime, Levaquin and 2L LR bolus in ED   -will continue with cefepime and add vancomycin  -Intranasal suctioning PRN and Antitussives, Duoneb   -Was given an extra 1L NS bolus and kept on maintenance fluids for labile BP   -F/up Blood and Urine cx, Urine Legionella, Pneumococcal  antigen, Nasal MRSA, 4pm lactate     Thrombocytopenia  Likely Reactive secondary to Active Infection  -Baseline around low 200's   -Monitor for now     Seizure Disorder   -c/w Phenobarbital     Asthma: c/w Albuterol and Nebs PRN     DVT PPX: Lovenox   GI PPX: not indicated   Full Code  Dispo: from [facility]: UCP   -Pending clinical improvement Patient  is a  63 y/o m with Medical Hx  of cerebral palsy from group home, seizure disorder, spastic paraplegia, s/p PEG tube, BPH, bladder neck stricture s/p suprapubic cath with history of ESBL in urine, Asthma, from group home presented with Fever and Productive cough     Healthcare associated Pneumonia  -Leukocytosis of 11K,  Tmax: 103F (rectal), tachycardic 118bpm and Tachypneic 24, lactate 2.7 on VBG  -Presenting with Productive cough and fever, saturating well on RA   -ABG: Respiratory Alkalosis likely from Hyperventilation  -CXR: LLL opacity, on Exam: Rhonchi b/l with Expiratory wheezing b/l, No accessory  muscle use   -S/P Cefepime, Levaquin and 2L LR bolus in ED   -will continue with cefepime and add vancomycin  -Intranasal suctioning PRN and Antitussives, Duoneb   -Was given an extra 1L NS bolus and kept on maintenance fluids for labile BP   -F/up Blood and Urine cx, Urine Legionella, Pneumococcal  antigen, Nasal MRSA, 4pm lactate     Thrombocytopenia  Likely Reactive secondary to Active Infection  -Baseline around low 200's   -Monitor for now     Seizure Disorder   -c/w Phenobarbital     Asthma: c/w Albuterol and Nebs PRN     DVT PPX: Lovenox   GI PPX: not indicated   Full Code  [facility]: U[facility]   -Pending clinical improvement

## 2020-09-08 NOTE — ED ADULT NURSE NOTE - OBJECTIVE STATEMENT
Brought in from NH accompanied by aide for fever and cough. Pt previously tested positive in June for Covid 19. Pt mildly tachypneic, lungs clear b/l. Pt rectally febrile at 103F.  Presents to ED with chronic peg tube and indwelling urinary catheter. Pt sepsis protocol. IV access attained, b/c sent x 2 with vbg and lactate. Fluids initiated with antibiotics and rectal tylenol given. Pending results and imaging. Pt medicated and Aide updated on probable admission . Connected to cardiac monitor, b/p cuff and pulse ox for observation

## 2020-09-08 NOTE — H&P ADULT - HISTORY OF PRESENT ILLNESS
Patient  is a  65 y/o m with Medical Hx  of cerebral palsy from group home, seizure disorder, spastic paraplegia, s/p PEG tube, BPH, bladder neck stricture s/p suprapubic cath, history of ESBL, asthma, from group home presented with Fever and Productive cough Patient  is a  65 y/o m with Medical Hx  of cerebral palsy from group home ([facility]) , seizure disorder, spastic paraplegia, s/p PEG tube, BPH, bladder neck stricture s/p suprapubic cath, history of ESBL, asthma, from group home presented with Fever and non- Productive cough.   Per Group home staff, Symptoms started last night with low grade fever of 100.5F he was given Tylenol with improvement. However, this morning it was reported that the patient kept coughing all night so the staff called the PMD who requested the patient be sent to ED given prior hx of testing positive for COVID.    In ED pt was septic with Leukocytosis of 11K, BP low 80's,  Tmax: 103F (rectal), tachycardic 118bpm and Tachypneic 24, lactate 2.7 on VBG  -ABG with Respiratory Alkalosis likley from hyperventilation   -S/P 2L LR bolus, duonebs, cefepime and levofloxacin

## 2020-09-08 NOTE — ED PROVIDER NOTE - OBJECTIVE STATEMENT
64 m, pmh of cerebral palsy, spastic quad, seizure disorder, coming in from group home b/c of cough and fever. PCA said pt. started coughing and breathing fast for starting two days ago. Minimum amount of phlegm production no blood. Pt. is baseline.  Denies cp, hematemesis, n/v, abd pain. Tmax is 64 m, pmh of cerebral palsy, spastic quad, seizure disorder, coming in from group home b/c of cough and fever. PCA said pt. started coughing and breathing fast for starting two days ago. Minimum amount of phlegm production no blood. Pt. is baseline.  Denies cp, hematemesis, n/v, abd pain.

## 2020-09-08 NOTE — ED PROVIDER NOTE - ATTENDING CONTRIBUTION TO CARE
64 yr old m w/ a pmh significant for asthma, BPH, seizure, spastic quadriplegia, osteoporosis who presents today with cough and fever. Pt states that for the past two days he has been having a productive cough and today was noted to have a fever. Pt denies any SOB, nausea, vomiting, diarrhea or abdominal pain.     Review of Systems    Constitutional: (-) fever  Cardiovascular: (-) chest pain, (-) syncope  Respiratory: (+) cough, (-) shortness of breath  Gastrointestinal: (-) vomiting, (-) diarrhea, (-) abdominal pain  Musculoskeletal: (-) neck pain, (-) back pain, (-) joint pain  Integumentary: (-) rash, (-) edema  Neurological: (-) headache, (-) altered mental status    Except as documented in the HPI, all other systems are negative.    VITAL SIGNS: I have reviewed nursing notes and confirm.  CONSTITUTIONAL: non-toxic, well appearing  SKIN: no rash, no petechiae.  EYES: PERRL, EOMI, pink conjunctiva, anicteric  ENT: tongue midline, no exudates, MMM  NECK: Supple; no meningismus, no JVD  CARD: RRR, no murmurs, equal radial pulses bilaterally 2+  RESP: crackles noted at the LLL   ABD: Soft, non-tender, non-distended, no peritoneal signs, no HSM, no CVA tenderness  EXT: Normal ROM x4. No edema. No calves tenderness  NEURO: Alert, oriented. CN2-12 intact, equal strength bilaterally, nl gait.  PSYCH: Cooperative, appropriate.    a/p  64 yr old m who presents today with cough and fever, concern for PNA   -cxr  -labs  -IVF  -antibiotics  -dispo as per above

## 2020-09-08 NOTE — H&P ADULT - ATTENDING COMMENTS
I saw and evaluated the patient. I have reviewed and agree with the findings and plan of care as documented above in the resident’s note (unless indicated differently below). Any necessary changes were made in the body of the text. I saw and evaluated the patient on 09/08/2020. I have reviewed and agree with the findings and plan of care as documented above in the resident’s note (unless indicated differently below). Any necessary changes were made in the body of the text.    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 Coming in for "I have pneumonia" and "I get one around this time of year." Pt says that he was coughing and had fever to 100.5 deg F per group home staff. Given persistence and progressive worsening of symptoms, and also given the patient's history, the pt's PMD referred him to the ED for further evaluation.    ROS:  Constitutional: +fever  Eyes: no new changes  CVS: no chest pain  Resp: +SOB; +coughing  GI: no vomiting; no diarrhea; no abd pain  : no dysuria  MSK: no pain  Skin: +"my back itches"  Neuro: no headache  All other systems reviewed and are negative    PMHx, home medications, SurHx, FHx and Social history as above in the corresponding sections of the note - reviewed and edited where appropriate    Exam:  Vitals: BP = 92/52; P = 82; T = 98.8; RR = 18; SpO2 95 on 3 L/min via NC  General: appears stated age; cooperative  Eyes: anicteric sclera; moist conjunctiva; PERRL  HENT: NC/AT; clear oropharynx; MMM  Neck: supple; trachea midline  Lungs: no tachypnea; no accessory muscle use; +end expiratory wheezing; +diffuse rhonci; +dependent crackles  CVS: RRR; S1 and S2 w/o MRGs  Abd: BS+; distended and firm but without guarding or rebound; non-tender x 4; no masses; no HSM; PEG in place  Ext: atrophic extremities; non-edematous; pulses palpable distally  Skin: flushed; warm; dry; no breakdown noted; atrophic  Neuro: spastic paraplegic  Psych: appropriate affect; alert and oriented to person, place and situation  : suprapubic in place    Labs significant for H&H 11.5/35, , lactate 3.1, gluc 177, proBNP 450, pH 7.53, pCO2 33, pO2 64 (?art sample)  U/A grossly positive w/ pyuria and LE +300 mg/dL protein  Covid-19 PCR not detected  CXR: b/l opacities  EKG: sinus tachy; qTC 423    Assessment:  (1) Pneumonia possibly gram negative  (2) Cerebral palsy (spastic quadriplegia)  (3) Normocytic anemia likely anemia of chronic disease  (4) Thrombocytopenia likely 2/2 infection  (5) Hx of seizure d/o - on meds w/o acute complaints  (6) Asthma - not in acute exacerbation  (7) Urinary retention / hx of recurrent UTI (ESBL) / suprapubic cath - stable    Plan:  (1) BCx x 2, UCx, Urine for Legionella and Strep, MRSA swab  (2) Abx: vanc/cef x 5 days (adjust abx based on C&S)  (3) Chest PT frequently; suctioning PRN; HOB > 35 degrees  (4) Aspiration precautions  (5) Hydration  (6) Medications as dosed  (7) Supportive care  (8) Dispo: acute ill and not d/c ready; anticipate d/c in 72 or + hours  --- Pending adequate tx of PNA and clinical improvement    Code status: full code I saw and evaluated the patient on 09/08/2020. I have reviewed and agree with the findings and plan of care as documented above in the resident’s note (unless indicated differently below). Any necessary changes were made in the body of the text.    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 Coming in for "I have pneumonia" and "I get one around this time of year." Pt says that he was coughing and had fever to 100.5 deg F per group home staff. Given persistence and progressive worsening of symptoms, and also given the patient's history, the pt's PMD referred him to the ED for further evaluation.    ROS:  Constitutional: +fever  Eyes: no new changes  CVS: no chest pain  Resp: +SOB; +coughing  GI: no vomiting; no diarrhea; no abd pain  : no dysuria  MSK: no pain  Skin: +"my back itches"  Neuro: no headache  All other systems reviewed and are negative    PMHx, home medications, SurHx, FHx and Social history as above in the corresponding sections of the note - reviewed and edited where appropriate    Exam:  Vitals: BP = 92/52; P = 82; T = 98.8; RR = 18; SpO2 95 on 3 L/min via NC  General: appears stated age; cooperative  Eyes: anicteric sclera; moist conjunctiva; PERRL  HENT: NC/AT; clear oropharynx; MMM  Neck: supple; trachea midline  Lungs: no tachypnea; no accessory muscle use; +end expiratory wheezing; +diffuse rhonci; +dependent crackles  CVS: RRR; S1 and S2 w/o MRGs  Abd: BS+; distended and firm but without guarding or rebound; non-tender x 4; no masses; no HSM; PEG in place  Ext: atrophic extremities; non-edematous; pulses palpable distally  Skin: flushed; warm; dry; no breakdown noted; atrophic  Neuro: spastic paraplegic  Psych: appropriate affect; alert and oriented to person, place and situation  : suprapubic in place (?purulence around entry site)    Labs significant for H&H 11.5/35, , lactate 3.1, gluc 177, proBNP 450, pH 7.53, pCO2 33, pO2 64 (?art sample)  U/A grossly positive w/ pyuria and LE +300 mg/dL protein  Covid-19 PCR not detected  CXR: b/l opacities  EKG: sinus tachy; qTC 423    Assessment:  (1) Pneumonia possibly gram negative + possible UTI  (2) Cerebral palsy (spastic quadriplegia)  (3) Normocytic anemia likely anemia of chronic disease  (4) Thrombocytopenia likely 2/2 infection  (5) Hx of seizure d/o - on meds w/o acute complaints  (6) Asthma - not in acute exacerbation  (7) Urinary retention / hx of recurrent UTI (ESBL) / suprapubic cath - stable    Plan:  (1) BCx x 2, UCx, Urine for Legionella and Strep, MRSA swab  (2) Abx: vanc/cef x 5 days (adjust abx based on C&S) + IV hydration  (3) Chest PT; suctioning PRN; HOB > 35 degrees; aspiration precautions  (4) Keep suprapubic site clean/dry; when last exchanged? (to check w/ group home)  --- If > 2 weeks, may benefit from tube exchange  (5) Medications as dosed  (6) Supportive care  (7) Dispo: acute ill and not d/c ready; anticipate d/c in 72 or + hours  --- Pending adequate tx of infection and clinical improvement    Code status: full code I saw and evaluated the patient on 09/08/2020. I have reviewed and agree with the findings and plan of care as documented above in the resident’s note (unless indicated differently below). Any necessary changes were made in the body of the text.    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 Coming in for "I have pneumonia" and "I get one around this time of year." Pt says that he was coughing and had fever to 100.5 deg F per group home staff. Given persistence and progressive worsening of symptoms, and also given the patient's history, the pt's PMD referred him to the ED for further evaluation.    ROS:  Constitutional: +fever  Eyes: no new changes  CVS: no chest pain  Resp: +SOB; +coughing  GI: no vomiting; no diarrhea; no abd pain  : no dysuria  MSK: no pain  Skin: +"my back itches"  Neuro: no headache  All other systems reviewed and are negative    PMHx, home medications, SurHx, FHx and Social history as above in the corresponding sections of the note - reviewed and edited where appropriate    Exam:  Vitals: BP = 92/52; P = 82; T = 98.8; RR = 18; SpO2 95 on 3 L/min via NC  General: appears stated age; cooperative  Eyes: anicteric sclera; moist conjunctiva; PERRL  HENT: NC/AT; clear oropharynx; MMM  Neck: supple; trachea midline  Lungs: no tachypnea; no accessory muscle use; +end expiratory wheezing; +diffuse rhonci; +dependent crackles  CVS: RRR; S1 and S2 w/o MRGs  Abd: BS+; distended and firm but without guarding or rebound; non-tender x 4; no masses; no HSM; PEG in place  Ext: atrophic extremities; non-edematous; pulses palpable distally  Skin: flushed; warm; dry; no breakdown noted; atrophic  Neuro: spastic paraplegic  Psych: appropriate affect; alert and oriented to person, place and situation  : suprapubic in place (?purulence around entry site)    Labs significant for H&H 11.5/35, , lactate 3.1, gluc 177, proBNP 450, pH 7.53, pCO2 33, pO2 64 (?art sample)  U/A grossly positive w/ pyuria and LE +300 mg/dL protein  Covid-19 PCR not detected  CXR: b/l opacities  EKG: sinus tachy; qTC 423    Assessment:  (1) Pneumonia possibly gram negative + possible UTI (hx of recurrent UTI)  (2) Cerebral palsy (spastic quadriplegia)  (3) Normocytic anemia likely anemia of chronic disease  (4) Thrombocytopenia likely 2/2 infection  (5) Hx of seizure d/o - on meds w/o acute complaints  (6) Asthma - not in acute exacerbation  (7) Urinary retention / BPH / suprapubic cath in place - stable    Plan:  (1) BCx x 2, UCx, Urine for Legionella and Strep, MRSA swab  (2) Abx: vanc/cef x 5 days (adjust abx based on C&S)  (3) Chest PT; suctioning PRN; HOB > 35 degrees; aspiration precautions  (4) Keep suprapubic site clean/dry  --- If in for > 2 weeks, can consider tube exchange  (5) Medications as dosed  (6) Supportive care  (7) Dispo: acute ill and not d/c ready; anticipate d/c in 72 or + hours  --- Pending adequate tx of infection and clinical improvement    Code status: full code

## 2020-09-09 LAB
ALBUMIN SERPL ELPH-MCNC: 2.8 G/DL — LOW (ref 3.5–5.2)
ALP SERPL-CCNC: 73 U/L — SIGNIFICANT CHANGE UP (ref 30–115)
ALT FLD-CCNC: 25 U/L — SIGNIFICANT CHANGE UP (ref 0–41)
ANION GAP SERPL CALC-SCNC: 8 MMOL/L — SIGNIFICANT CHANGE UP (ref 7–14)
AST SERPL-CCNC: 32 U/L — SIGNIFICANT CHANGE UP (ref 0–41)
BASE EXCESS BLDA CALC-SCNC: 2.5 MMOL/L — HIGH (ref -2–2)
BASOPHILS # BLD AUTO: 0.01 K/UL — SIGNIFICANT CHANGE UP (ref 0–0.2)
BASOPHILS NFR BLD AUTO: 0.2 % — SIGNIFICANT CHANGE UP (ref 0–1)
BILIRUB SERPL-MCNC: 0.3 MG/DL — SIGNIFICANT CHANGE UP (ref 0.2–1.2)
BUN SERPL-MCNC: 16 MG/DL — SIGNIFICANT CHANGE UP (ref 10–20)
CALCIUM SERPL-MCNC: 8.1 MG/DL — LOW (ref 8.5–10.1)
CHLORIDE SERPL-SCNC: 107 MMOL/L — SIGNIFICANT CHANGE UP (ref 98–110)
CO2 SERPL-SCNC: 25 MMOL/L — SIGNIFICANT CHANGE UP (ref 17–32)
CREAT SERPL-MCNC: 0.5 MG/DL — LOW (ref 0.7–1.5)
CULTURE RESULTS: SIGNIFICANT CHANGE UP
EOSINOPHIL # BLD AUTO: 0.04 K/UL — SIGNIFICANT CHANGE UP (ref 0–0.7)
EOSINOPHIL NFR BLD AUTO: 0.6 % — SIGNIFICANT CHANGE UP (ref 0–8)
GLUCOSE BLDC GLUCOMTR-MCNC: 161 MG/DL — HIGH (ref 70–99)
GLUCOSE SERPL-MCNC: 88 MG/DL — SIGNIFICANT CHANGE UP (ref 70–99)
GP B STREP DNA BLD POS QL NAA+NON-PROBE: SIGNIFICANT CHANGE UP
GRAM STN FLD: SIGNIFICANT CHANGE UP
HCO3 BLDA-SCNC: 25 MMOL/L — SIGNIFICANT CHANGE UP (ref 21–29)
HCT VFR BLD CALC: 31.7 % — LOW (ref 42–52)
HGB BLD-MCNC: 10.4 G/DL — LOW (ref 14–18)
IMM GRANULOCYTES NFR BLD AUTO: 0.5 % — HIGH (ref 0.1–0.3)
LYMPHOCYTES # BLD AUTO: 0.79 K/UL — LOW (ref 1.2–3.4)
LYMPHOCYTES # BLD AUTO: 11.9 % — LOW (ref 20.5–51.1)
MCHC RBC-ENTMCNC: 31.4 PG — HIGH (ref 27–31)
MCHC RBC-ENTMCNC: 32.8 G/DL — SIGNIFICANT CHANGE UP (ref 32–37)
MCV RBC AUTO: 95.8 FL — HIGH (ref 80–94)
METHOD TYPE: SIGNIFICANT CHANGE UP
MONOCYTES # BLD AUTO: 0.54 K/UL — SIGNIFICANT CHANGE UP (ref 0.1–0.6)
MONOCYTES NFR BLD AUTO: 8.2 % — SIGNIFICANT CHANGE UP (ref 1.7–9.3)
MSSA DNA SPEC QL NAA+PROBE: SIGNIFICANT CHANGE UP
NEUTROPHILS # BLD AUTO: 5.21 K/UL — SIGNIFICANT CHANGE UP (ref 1.4–6.5)
NEUTROPHILS NFR BLD AUTO: 78.6 % — HIGH (ref 42.2–75.2)
NRBC # BLD: 0 /100 WBCS — SIGNIFICANT CHANGE UP (ref 0–0)
PCO2 BLDA: 31 MMHG — LOW (ref 38–42)
PH BLDA: 7.52 — HIGH (ref 7.38–7.42)
PLATELET # BLD AUTO: 80 K/UL — LOW (ref 130–400)
PO2 BLDA: 72 MMHG — LOW (ref 78–95)
POTASSIUM SERPL-MCNC: 4.2 MMOL/L — SIGNIFICANT CHANGE UP (ref 3.5–5)
POTASSIUM SERPL-SCNC: 4.2 MMOL/L — SIGNIFICANT CHANGE UP (ref 3.5–5)
PROT SERPL-MCNC: 5.5 G/DL — LOW (ref 6–8)
RBC # BLD: 3.31 M/UL — LOW (ref 4.7–6.1)
RBC # FLD: 13.6 % — SIGNIFICANT CHANGE UP (ref 11.5–14.5)
SAO2 % BLDA: 97 % — HIGH (ref 92–96)
SODIUM SERPL-SCNC: 140 MMOL/L — SIGNIFICANT CHANGE UP (ref 135–146)
SPECIMEN SOURCE: SIGNIFICANT CHANGE UP
WBC # BLD: 6.62 K/UL — SIGNIFICANT CHANGE UP (ref 4.8–10.8)
WBC # FLD AUTO: 6.62 K/UL — SIGNIFICANT CHANGE UP (ref 4.8–10.8)

## 2020-09-09 PROCEDURE — 71045 X-RAY EXAM CHEST 1 VIEW: CPT | Mod: 26

## 2020-09-09 PROCEDURE — 74018 RADEX ABDOMEN 1 VIEW: CPT | Mod: 26

## 2020-09-09 PROCEDURE — 99233 SBSQ HOSP IP/OBS HIGH 50: CPT

## 2020-09-09 RX ORDER — CALCIUM CARBONATE 500(1250)
1 TABLET ORAL
Refills: 0 | Status: DISCONTINUED | OUTPATIENT
Start: 2020-09-09 | End: 2020-09-15

## 2020-09-09 RX ORDER — VANCOMYCIN HCL 1 G
1500 VIAL (EA) INTRAVENOUS EVERY 12 HOURS
Refills: 0 | Status: DISCONTINUED | OUTPATIENT
Start: 2020-09-09 | End: 2020-09-10

## 2020-09-09 RX ORDER — SENNA PLUS 8.6 MG/1
2 TABLET ORAL AT BEDTIME
Refills: 0 | Status: DISCONTINUED | OUTPATIENT
Start: 2020-09-09 | End: 2020-09-15

## 2020-09-09 RX ORDER — VANCOMYCIN HCL 1 G
1500 VIAL (EA) INTRAVENOUS ONCE
Refills: 0 | Status: COMPLETED | OUTPATIENT
Start: 2020-09-09 | End: 2020-09-09

## 2020-09-09 RX ORDER — PHENOBARBITAL 60 MG
64.8 TABLET ORAL DAILY
Refills: 0 | Status: DISCONTINUED | OUTPATIENT
Start: 2020-09-09 | End: 2020-09-15

## 2020-09-09 RX ORDER — VANCOMYCIN HCL 1 G
VIAL (EA) INTRAVENOUS
Refills: 0 | Status: DISCONTINUED | OUTPATIENT
Start: 2020-09-09 | End: 2020-09-10

## 2020-09-09 RX ORDER — SODIUM CHLORIDE 9 MG/ML
500 INJECTION INTRAMUSCULAR; INTRAVENOUS; SUBCUTANEOUS ONCE
Refills: 0 | Status: COMPLETED | OUTPATIENT
Start: 2020-09-09 | End: 2020-09-09

## 2020-09-09 RX ORDER — BACLOFEN 100 %
10 POWDER (GRAM) MISCELLANEOUS THREE TIMES A DAY
Refills: 0 | Status: DISCONTINUED | OUTPATIENT
Start: 2020-09-09 | End: 2020-09-15

## 2020-09-09 RX ORDER — OXYBUTYNIN CHLORIDE 5 MG
5 TABLET ORAL EVERY 12 HOURS
Refills: 0 | Status: DISCONTINUED | OUTPATIENT
Start: 2020-09-09 | End: 2020-09-15

## 2020-09-09 RX ADMIN — CEFEPIME 100 MILLIGRAM(S): 1 INJECTION, POWDER, FOR SOLUTION INTRAMUSCULAR; INTRAVENOUS at 22:04

## 2020-09-09 RX ADMIN — Medication 1 DROP(S): at 06:21

## 2020-09-09 RX ADMIN — Medication 1 TABLET(S): at 06:20

## 2020-09-09 RX ADMIN — Medication 10 MILLIGRAM(S): at 13:17

## 2020-09-09 RX ADMIN — Medication 3 MILLILITER(S): at 01:15

## 2020-09-09 RX ADMIN — Medication 300 MILLIGRAM(S): at 06:19

## 2020-09-09 RX ADMIN — Medication 1 DROP(S): at 13:19

## 2020-09-09 RX ADMIN — Medication 40 MILLIGRAM(S): at 09:30

## 2020-09-09 RX ADMIN — Medication 3 MILLILITER(S): at 09:10

## 2020-09-09 RX ADMIN — ENOXAPARIN SODIUM 40 MILLIGRAM(S): 100 INJECTION SUBCUTANEOUS at 22:04

## 2020-09-09 RX ADMIN — CEFEPIME 100 MILLIGRAM(S): 1 INJECTION, POWDER, FOR SOLUTION INTRAMUSCULAR; INTRAVENOUS at 07:37

## 2020-09-09 RX ADMIN — Medication 10 MILLIGRAM(S): at 22:04

## 2020-09-09 RX ADMIN — Medication 300 MILLIGRAM(S): at 18:49

## 2020-09-09 RX ADMIN — Medication 60 MILLIGRAM(S): at 18:47

## 2020-09-09 RX ADMIN — Medication 100 MILLIGRAM(S): at 06:20

## 2020-09-09 RX ADMIN — Medication 1 DROP(S): at 19:51

## 2020-09-09 RX ADMIN — Medication 5 MILLILITER(S): at 01:11

## 2020-09-09 RX ADMIN — SODIUM CHLORIDE 1000 MILLILITER(S): 9 INJECTION INTRAMUSCULAR; INTRAVENOUS; SUBCUTANEOUS at 01:11

## 2020-09-09 RX ADMIN — CEFEPIME 100 MILLIGRAM(S): 1 INJECTION, POWDER, FOR SOLUTION INTRAMUSCULAR; INTRAVENOUS at 13:17

## 2020-09-09 RX ADMIN — Medication 1 TABLET(S): at 18:47

## 2020-09-09 RX ADMIN — Medication 5 MILLIGRAM(S): at 18:47

## 2020-09-09 RX ADMIN — Medication 5 MILLIGRAM(S): at 06:20

## 2020-09-09 RX ADMIN — Medication 10 MILLIGRAM(S): at 06:20

## 2020-09-09 RX ADMIN — SENNA PLUS 2 TABLET(S): 8.6 TABLET ORAL at 22:06

## 2020-09-09 RX ADMIN — Medication 5 MILLILITER(S): at 06:21

## 2020-09-09 RX ADMIN — Medication 64.8 MILLIGRAM(S): at 15:07

## 2020-09-09 RX ADMIN — Medication 3 MILLILITER(S): at 13:24

## 2020-09-09 NOTE — PROGRESS NOTE ADULT - SUBJECTIVE AND OBJECTIVE BOX
TISH SHAIKH  64y  [id number]      Patient is a 64y old  Male who presents with a chief complaint of Sepsis secondary to Pneumonia (08 Sep 2020 14:39)      INTERVAL HPI/OVERNIGHT EVENTS:      overnight noted to have mild wheeze , which as since resolved  REVIEW OF SYSTEMS:  Unable to ROS due to Mental status  FAMILY HISTORY:  Family history unknown    T(C): 37.5 (09-09-20 @ 07:45), Max: 37.5 (09-09-20 @ 07:45)  HR: 99 (09-09-20 @ 07:45) (61 - 122)  BP: 110/65 (09-09-20 @ 07:45) (83/46 - 112/74)  RR: 19 (09-09-20 @ 07:45) (18 - 22)  SpO2: 98% (09-09-20 @ 12:15) (94% - 100%)  Wt(kg): --Vital Signs Last 24 Hrs  T(C): 37.5 (09 Sep 2020 07:45), Max: 37.5 (09 Sep 2020 07:45)  T(F): 99.5 (09 Sep 2020 07:45), Max: 99.5 (09 Sep 2020 07:45)  HR: 99 (09 Sep 2020 07:45) (61 - 122)  BP: 110/65 (09 Sep 2020 07:45) (83/46 - 112/74)  BP(mean): --  RR: 19 (09 Sep 2020 07:45) (18 - 22)  SpO2: 98% (09 Sep 2020 12:15) (94% - 100%)    PHYSICAL EXAM:  GENERAL: NAD,   HEAD:  Atraumatic, Normocephalic  EYES: EOMI, PERRLA, conjunctiva and sclera clear  ENMT: No tonsillar erythema, exudates, or enlargement; Moist mucous membranes, very poor dention dentition, No lesions  NECK: Supple, No JVD, Normal thyroid  NERVOUS SYSTEM:  Alert  PULM: bilateral air entry  CARDIAC: S1S2  GI: Soft, Nontender, + distended  EXTREMITIES:  2+ Peripheral Pulses,  LYMPH: No lymphadenopathy noted        LABS:                            10.4   6.62  )-----------( 80       ( 09 Sep 2020 06:43 )             31.7   09-09    140  |  107  |  16  ----------------------------<  88  4.2   |  25  |  0.5<L>    Ca    8.1<L>      09 Sep 2020 06:43    TPro  5.5<L>  /  Alb  2.8<L>  /  TBili  0.3  /  DBili  x   /  AST  32  /  ALT  25  /  AlkPhos  73  09-09            Culture - Blood (collected 08 Sep 2020 18:35)  Source: .Blood Blood  Gram Stain (09 Sep 2020 09:01):    Growth in aerobic bottle: Gram Positive Cocci in Pairs and Chains and    Gram Positive Cocci in Clusters    Growth in anaerobic bottle: Gram Positive Cocci in Pairs and Chains and    Gram Positive Cocci in Clusters  Preliminary Report (09 Sep 2020 09:02):    Growth in anaerobic bottle: Gram Positive Cocci in Pairs and Chains and    Gram Positive Cocci in Clusters    Growth in aerobic bottle: Gram Positive Cocci in Pairs and Chains and    Gram Positive Cocci in Clusters    "Due to technical problems, Proteus sp. will Not be reported as part of    the BCID panel until further notice"    ***Blood Panel PCR results on this specimen are available    approximately 3 hours after the Gram stain result.***    Gram stain, PCR, and/or culture results may not always    correspond due to difference in methodologies.    ************************************************************    This PCR assay was performed using [employer].    The following targets are tested for: Enterococcus,    vancomycin resistant enterococci, Listeria monocytogenes,    coagulase negative staphylococci, S. aureus,    methicillin resistant S. aureus, Streptococcus agalactiae    (Group B), S. pneumoniae, S. pyogenes (Group A),    Acinetobacter baumannii, Enterobacter cloacae, E. coli,    Klebsiella oxytoca, K. pneumoniae, Proteus sp.,    Serratia marcescens, Haemophilus influenzae,    Neisseria meningitidis, Pseudomonas aeruginosa, Candida    albicans, C. glabrata, C krusei, C parapsilosis,    C. tropicalis and the KPC resistance gene.  Organism: Blood Culture PCR (09 Sep 2020 09:00)  Organism: Blood Culture PCR (09 Sep 2020 09:00)    Culture - Blood (collected 08 Sep 2020 18:35)  Source: .Blood Blood-Peripheral  Gram Stain (09 Sep 2020 10:03):    Growth in anaerobic bottle: Gram Positive Cocci in Clusters and Gram    positive cocci in pairs    Growth in aerobic bottle: Gram Positive Cocci in Clusters and Gram    positive cocci in pairs  Preliminary Report (09 Sep 2020 10:03):    Growth in anaerobic bottle: Gram Positive Cocci in Clusters and Gram    positive cocci in pairs    Growth in aerobic bottle: Gram Positive Cocci in Clusters and Gram    positive cocci in pairs      albuterol/ipratropium for Nebulization 3 milliLiter(s) Nebulizer every 6 hours  baclofen 10 milliGRAM(s) Oral three times a day  benzonatate 100 milliGRAM(s) Oral every 8 hours  calcium carbonate   1250 mG (OsCal) 1 Tablet(s) Oral two times a day  cefepime   IVPB 2000 milliGRAM(s) IV Intermittent every 8 hours  enoxaparin Injectable 40 milliGRAM(s) SubCutaneous at bedtime  methylPREDNISolone sodium succinate Injectable 60 milliGRAM(s) IV Push every 12 hours  oxybutynin 5 milliGRAM(s) Oral every 12 hours  PHENobarbital 64.8 milliGRAM(s) Oral daily  polyethylene glycol 3350 Oral Powder - Peds 17 Gram(s) Oral daily  polyvinyl alcohol 1.4%/povidone 0.6% Ophthalmic Solution - Peds 1 Drop(s) Both EYES four times a day  senna 2 Tablet(s) Oral at bedtime  vancomycin  IVPB 1500 milliGRAM(s) IV Intermittent every 12 hours  vancomycin  IVPB          HEALTH ISSUES - PROBLEM Dx:          Case Discussed with House Staff      Spectra [phone]

## 2020-09-09 NOTE — PROGRESS NOTE ADULT - ASSESSMENT
HPI:  Patient  is a  63 y/o m with Medical Hx  of cerebral palsy from group home ([facility]) , seizure disorder, spastic paraplegia, s/p PEG tube, BPH, bladder neck stricture s/p suprapubic cath, history of ESBL, asthma, from group home presented with Fever and non- Productive cough.   Per [facility] [place] staff, Symptoms started last night with low grade fever of 100.5F he was given Tylenol with improvement. However, this morning it was reported that the patient kept coughing all night so the staff called the PMD who requested the patient be sent to ED given prior hx of testing positive for COVID.    In ED pt was septic with Leukocytosis of 11K, BP low 80's,  Tmax: 103F (rectal), tachycardic 118bpm and Tachypneic 24, lactate 2.7 on VBG  -ABG with Respiratory Alkalosis likley from hyperventilation   -S/P 2L LR bolus, duonebs, cefepime and levofloxacin (08 Sep 2020 14:39)    #Severe sepsis secondary to bacteremia / uti , with history of stent replacement (5/26)/ nephrolithiasis   id consult   cover for health care associated given patient was admitted and discharged within the past 3 months - vancomyicn and meropenum   Blood Gas Arterial, Lactate: 1.0 mmoL/L (09.09.20 @ 08:44)- resolved    #COVID hx - resolved, latest swab negative    Intellectual disability secondary to cerebral palsy    # Functional quadriplegia - full care    # Asthma -sp solumderol ,albuterol prn      #Drug monitoring , check vancomycin trough     Progress Note Handoff    Pending: id consult . full culture and sensitivities    Family discussion: discussed with Leslie  ([phone])  at 1:17 PM     Disposition: [facility] [place]

## 2020-09-09 NOTE — PROGRESS NOTE ADULT - SUBJECTIVE AND OBJECTIVE BOX
24H events:    Patient is a 64y old Male who presents with a chief complaint of Sepsis secondary to Pneumonia (09 Sep 2020 13:05)  Primary diagnosis of Pneumonia  Today is hospital day 1d. This morning patient was seen and examined at bedside.  Patient complains of SOB, cough with sputum production.      PAST MEDICAL & SURGICAL HISTORY  Asthma  Urinary retention  Urinary calculi  Spastic quadriplegia  Osteoporosis  Seizure: last seizure &gt;10 years ago  Cerebral palsy  BPH (benign prostatic hyperplasia)  Suprapubic catheter  H/O cystoscopy  S/P percutaneous endoscopic gastrostomy (PEG) tube placement    SOCIAL HISTORY:  Social History:  Denies x 3 (08 Sep 2020 14:39)      ALLERGIES:  No Known Allergies    MEDICATIONS:  STANDING MEDICATIONS  baclofen 10 milliGRAM(s) Oral three times a day  benzonatate 100 milliGRAM(s) Oral every 8 hours  calcium carbonate   1250 mG (OsCal) 1 Tablet(s) Oral two times a day  cefepime   IVPB 2000 milliGRAM(s) IV Intermittent every 8 hours  enoxaparin Injectable 40 milliGRAM(s) SubCutaneous at bedtime  methylPREDNISolone sodium succinate Injectable 60 milliGRAM(s) IV Push every 12 hours  oxybutynin 5 milliGRAM(s) Oral every 12 hours  PHENobarbital 64.8 milliGRAM(s) Oral daily  polyethylene glycol 3350 Oral Powder - Peds 17 Gram(s) Oral daily  polyvinyl alcohol 1.4%/povidone 0.6% Ophthalmic Solution - Peds 1 Drop(s) Both EYES four times a day  senna 2 Tablet(s) Oral at bedtime  vancomycin  IVPB 1500 milliGRAM(s) IV Intermittent every 12 hours  vancomycin  IVPB        PRN MEDICATIONS    VITALS:   T(F): 98.2  HR: 103  BP: 98/53  RR: 18  SpO2: 93%    PHYSICAL EXAM:  Tachypneic looks in distress  EYES: EOMI, PERRLA, conjunctiva and sclera clear  ENMT: No tonsillar erythema, exudates, or enlargement; Moist mucous membranes, very poor dentition, No lesions  NECK: Supple, No JVD, Normal thyroid  NERVOUS SYSTEM:  conscious, cooperative, alert  PULM: Bilateral wheezes   CARDIAC: S1S2  GI: distended, tender abdomen, bowel sound sluggish, with Peg tube inserted  EXTREMITIES:  2+ Peripheral Pulses, extremities in contracture   LYMPH: No lymphadenopathy noted    LABS:                        10.4   6.62  )-----------( 80       ( 09 Sep 2020 06:43 )             31.7         140  |  107  |  16  ----------------------------<  88  4.2   |  25  |  0.5<L>    Ca    8.1<L>      09 Sep 2020 06:43    TPro  5.5<L>  /  Alb  2.8<L>  /  TBili  0.3  /  DBili  x   /  AST  32  /  ALT  25  /  AlkPhos  73        Urinalysis Basic - ( 08 Sep 2020 18:35 )    Color: Tita / Appearance: Slightly Turbid / S.022 / pH: x  Gluc: x / Ketone: Negative  / Bili: Negative / Urobili: <2 mg/dL   Blood: x / Protein: 300 mg/dL / Nitrite: Negative   Leuk Esterase: Large / RBC: 4 /HPF / WBC 90 /HPF   Sq Epi: x / Non Sq Epi: 2 /HPF / Bacteria: Many      ABG - ( 09 Sep 2020 08:44 )  pH, Arterial: 7.52  pH, Blood: x     /  pCO2: 31    /  pO2: 72    / HCO3: 25    / Base Excess: 2.5   /  SaO2: 97          Culture - Blood (collected 08 Sep 2020 18:35)  Source: .Blood Blood  Gram Stain (09 Sep 2020 09:01):    Growth in aerobic bottle: Gram Positive Cocci in Pairs and Chains and    Gram Positive Cocci in Clusters    Growth in anaerobic bottle: Gram Positive Cocci in Pairs and Chains and    Gram Positive Cocci in Clusters  Preliminary Report (09 Sep 2020 09:02):    Growth in anaerobic bottle: Gram Positive Cocci in Pairs and Chains and    Gram Positive Cocci in Clusters    Growth in aerobic bottle: Gram Positive Cocci in Pairs and Chains and    Gram Positive Cocci in Clusters    "Due to technical problems, Proteus sp. will Not be reported as part of    the BCID panel until further notice"    ***Blood Panel PCR results on this specimen are available    approximately 3 hours after the Gram stain result.***    Gram stain, PCR, and/or culture results may not always    correspond due to difference in methodologies.    ************************************************************    This PCR assay was performed using [employer].    The following targets are tested for: Enterococcus,    vancomycin resistant enterococci, Listeria monocytogenes,    coagulase negative staphylococci, S. aureus,    methicillin resistant S. aureus, Streptococcus agalactiae    (Group B), S. pneumoniae, S. pyogenes (Group A),    Acinetobacter baumannii, Enterobacter cloacae, E. coli,    Klebsiella oxytoca, K. pneumoniae, Proteus sp.,    Serratia marcescens, Haemophilus influenzae,    Neisseria meningitidis, Pseudomonas aeruginosa, Candida    albicans, C. glabrata, C krusei, C parapsilosis,    C. tropicalis and the KPC resistance gene.  Organism: Blood Culture PCR (09 Sep 2020 09:00)  Organism: Blood Culture PCR (09 Sep 2020 09:00)    Culture - Blood (collected 08 Sep 2020 18:35)  Source: .Blood Blood-Peripheral  Gram Stain (09 Sep 2020 10:03):    Growth in anaerobic bottle: Gram Positive Cocci in Clusters and Gram    positive cocci in pairs    Growth in aerobic bottle: Gram Positive Cocci in Clusters and Gram    positive cocci in pairs  Preliminary Report (09 Sep 2020 10:03):    Growth in anaerobic bottle: Gram Positive Cocci in Clusters and Gram    positive cocci in pairs    Growth in aerobic bottle: Gram Positive Cocci in Clusters and Gram    positive cocci in pairs          RADIOLOGY:    Xray Chest 1 View-PORTABLE IMMEDIATE (20 @ 09:54)   Stable bilateral opacities    Xray Kidney Ureter Bladder (20 @ 09:54)   Nonspecific bowel gas pattern, noting generalized gaseous distention of colon. Similar bowel gas pattern had been seen on remote abdominal radiograph of 2020.  Gastrostomy tube overlying the left upper quadrant.  Osteopenia. Chronic hip deformities. Degenerative changes of the spine.

## 2020-09-09 NOTE — PROGRESS NOTE ADULT - ASSESSMENT
65 y/o m with Medical Hx  of cerebral palsy from group home, seizure disorder, spastic paraplegia, s/p PEG tube, BPH, bladder neck stricture s/p suprapubic cath with history of ESBL in urine, Asthma, from group home presented with Fever and Productive cough     Healthcare associated Pneumonia  -Leukocytosis of 11K,  Tmax: 103F (rectal), tachycardic 118bpm and Tachypneic 24, lactate 2.7 on VBG  -Presenting with Productive cough and fever, saturating well on RA   -ABG: Respiratory Alkalosis likely from Hyperventilation  -CXR: LLL opacity, on Exam: Rhonchi b/l with Expiratory wheezing b/l, No accessory  muscle use   -S/P Cefepime, Levaquin and 2L LR bolus in ED   -will continue with cefepime and add vancomycin  -Intranasal suctioning PRN and Antitussives, Duoneb   -Was given an extra 1L NS bolus and kept on maintenance fluids for labile BP   -F/up Blood and Urine cx, Urine Legionella, Pneumococcal  antigen, Nasal MRSA, 4pm lactate     Thrombocytopenia  Likely Reactive secondary to Active Infection  -Baseline around low 200's   -Monitor for now     Seizure Disorder   -c/w Phenobarbital     Asthma: c/w Albuterol and Nebs PRN     DVT PPX: Lovenox   GI PPX: not indicated   Full Code  Dispo: from nursing home: UCP   -Pending clinical improvement 65 y/o m with Medical Hx  of cerebral palsy from group home, seizure disorder, spastic paraplegia, s/p PEG tube, BPH, bladder neck stricture s/p suprapubic cath with history of ESBL in urine, Asthma, from group home presented with Fever and Productive cough     #Pneumonia  -Septic on admission :Leukocytosis of 11K,  T 103F (rectal), tachycardic 118bpm and Tachypneic 24, lactate 2.7  -COVID ruled out  -Patient from nursing home high risk from Pseudomonas and staph aureus  -ABG: Acute respiratory Respiratory Alkalosis   -S/P Cefepime, Levaquin and 2L LR bolus in ED   -Continue with vancomycin and cefepime  -Solumedrol IV q 12h   -F/up Blood and Urine cx, Urine Legionella, Pneumococcal  antigen, Nasal MRSA    #Bacteremia  -Gram positive cocci in chains and clusters  -On vancomycin Iv  -Vancomycin level before the 4th dose tomorrow at 6:00 pm  -Follow culture sensitivities  -Consult ID    #Seizure Disorder   -c/w Phenobarbital     Asthma:  - c/w Albuterol and Nebs PRN   -Solumedrol IV BID    DVT PPX: Lovenox   GI PPX: not indicated   Full Code

## 2020-09-10 LAB
ALBUMIN SERPL ELPH-MCNC: 2.7 G/DL — LOW (ref 3.5–5.2)
ALP SERPL-CCNC: 77 U/L — SIGNIFICANT CHANGE UP (ref 30–115)
ALT FLD-CCNC: 22 U/L — SIGNIFICANT CHANGE UP (ref 0–41)
ANION GAP SERPL CALC-SCNC: 9 MMOL/L — SIGNIFICANT CHANGE UP (ref 7–14)
AST SERPL-CCNC: 29 U/L — SIGNIFICANT CHANGE UP (ref 0–41)
BILIRUB SERPL-MCNC: 0.2 MG/DL — SIGNIFICANT CHANGE UP (ref 0.2–1.2)
BUN SERPL-MCNC: 19 MG/DL — SIGNIFICANT CHANGE UP (ref 10–20)
CALCIUM SERPL-MCNC: 8 MG/DL — LOW (ref 8.5–10.1)
CHLORIDE SERPL-SCNC: 102 MMOL/L — SIGNIFICANT CHANGE UP (ref 98–110)
CO2 SERPL-SCNC: 25 MMOL/L — SIGNIFICANT CHANGE UP (ref 17–32)
CREAT SERPL-MCNC: 0.5 MG/DL — LOW (ref 0.7–1.5)
GLUCOSE BLDC GLUCOMTR-MCNC: 141 MG/DL — HIGH (ref 70–99)
GLUCOSE BLDC GLUCOMTR-MCNC: 178 MG/DL — HIGH (ref 70–99)
GLUCOSE BLDC GLUCOMTR-MCNC: 195 MG/DL — HIGH (ref 70–99)
GLUCOSE SERPL-MCNC: 182 MG/DL — HIGH (ref 70–99)
HCT VFR BLD CALC: 28.5 % — LOW (ref 42–52)
HGB BLD-MCNC: 9.5 G/DL — LOW (ref 14–18)
MAGNESIUM SERPL-MCNC: 2.1 MG/DL — SIGNIFICANT CHANGE UP (ref 1.8–2.4)
MCHC RBC-ENTMCNC: 31.3 PG — HIGH (ref 27–31)
MCHC RBC-ENTMCNC: 33.3 G/DL — SIGNIFICANT CHANGE UP (ref 32–37)
MCV RBC AUTO: 93.8 FL — SIGNIFICANT CHANGE UP (ref 80–94)
NRBC # BLD: 0 /100 WBCS — SIGNIFICANT CHANGE UP (ref 0–0)
PLATELET # BLD AUTO: 99 K/UL — LOW (ref 130–400)
POTASSIUM SERPL-MCNC: 4.1 MMOL/L — SIGNIFICANT CHANGE UP (ref 3.5–5)
POTASSIUM SERPL-SCNC: 4.1 MMOL/L — SIGNIFICANT CHANGE UP (ref 3.5–5)
PROT SERPL-MCNC: 5.2 G/DL — LOW (ref 6–8)
RBC # BLD: 3.04 M/UL — LOW (ref 4.7–6.1)
RBC # FLD: 13.2 % — SIGNIFICANT CHANGE UP (ref 11.5–14.5)
SODIUM SERPL-SCNC: 136 MMOL/L — SIGNIFICANT CHANGE UP (ref 135–146)
WBC # BLD: 6.62 K/UL — SIGNIFICANT CHANGE UP (ref 4.8–10.8)
WBC # FLD AUTO: 6.62 K/UL — SIGNIFICANT CHANGE UP (ref 4.8–10.8)

## 2020-09-10 PROCEDURE — 99233 SBSQ HOSP IP/OBS HIGH 50: CPT

## 2020-09-10 RX ORDER — CEFAZOLIN SODIUM 1 G
2000 VIAL (EA) INJECTION EVERY 8 HOURS
Refills: 0 | Status: DISCONTINUED | OUTPATIENT
Start: 2020-09-10 | End: 2020-09-15

## 2020-09-10 RX ADMIN — Medication 1 DROP(S): at 05:03

## 2020-09-10 RX ADMIN — Medication 5 MILLIGRAM(S): at 05:02

## 2020-09-10 RX ADMIN — CEFEPIME 100 MILLIGRAM(S): 1 INJECTION, POWDER, FOR SOLUTION INTRAMUSCULAR; INTRAVENOUS at 05:01

## 2020-09-10 RX ADMIN — Medication 1 DROP(S): at 12:22

## 2020-09-10 RX ADMIN — ENOXAPARIN SODIUM 40 MILLIGRAM(S): 100 INJECTION SUBCUTANEOUS at 22:17

## 2020-09-10 RX ADMIN — SENNA PLUS 2 TABLET(S): 8.6 TABLET ORAL at 22:17

## 2020-09-10 RX ADMIN — Medication 1 DROP(S): at 18:29

## 2020-09-10 RX ADMIN — Medication 300 MILLIGRAM(S): at 05:02

## 2020-09-10 RX ADMIN — Medication 10 MILLIGRAM(S): at 22:17

## 2020-09-10 RX ADMIN — Medication 100 MILLIGRAM(S): at 22:17

## 2020-09-10 RX ADMIN — Medication 10 MILLIGRAM(S): at 14:16

## 2020-09-10 RX ADMIN — Medication 1 TABLET(S): at 18:27

## 2020-09-10 RX ADMIN — Medication 60 MILLIGRAM(S): at 05:02

## 2020-09-10 RX ADMIN — Medication 1 TABLET(S): at 05:02

## 2020-09-10 RX ADMIN — Medication 1 DROP(S): at 01:57

## 2020-09-10 RX ADMIN — Medication 5 MILLIGRAM(S): at 18:27

## 2020-09-10 RX ADMIN — Medication 64.8 MILLIGRAM(S): at 12:23

## 2020-09-10 RX ADMIN — Medication 10 MILLIGRAM(S): at 05:02

## 2020-09-10 NOTE — DIETITIAN INITIAL EVALUATION ADULT. - ENERGY NEEDS
calories: 1376 - 1500 kcals/day (MSJ x 1.1 - 1.2 AF for bed bound)  protein: 57 - 68 gms/day (1.0 - 1.2 gm/kg)  fluid: 1ml/kcal or per LIP

## 2020-09-10 NOTE — CONSULT NOTE ADULT - ASSESSMENT
ASSESSMENT  65 y/o m with Medical Hx  of cerebral palsy from group home (UCP) , seizure disorder, spastic paraplegia, s/p PEG tube, BPH, bladder neck stricture s/p suprapubic cath, history of ESBL, asthma, from group home presented with Fever and non- Productive cough.       IMPRESSION  #Polymicrobial bacteremia with MSSA & GBS    9/8 BCX +    9/8 UCX >3 orgs  #CXR with bilateral opacities    hx COVID 5/2020 9/8 PCR NEGATIVE   #Severe Sepsis on admission T>101F, Pulse>90, lactic acidosis,  #Lactic acidosis  #Hyponatremia       RECOMMENDATIONS  This is an incomplete consult note. All recommendations to follow after interview and examination of the patient.       If any questions, please call or send a message on [employer] Teams  Spectra [phone] ASSESSMENT  63 y/o m with Medical Hx  of cerebral palsy from group home (UCP) , seizure disorder, spastic paraplegia, s/p PEG tube, BPH, bladder neck stricture s/p suprapubic cath, history of ESBL, asthma, from group home presented with Fever and non- Productive cough.       IMPRESSION  #Polymicrobial bacteremia with MSSA & GBS, unclear source    9/8 BCX +    9/8 UCX >3 orgs    SPC appears clean, PEG clean, no back pain, no devices/prosthesis, recent catheters  #CXR with bilateral opacities    hx COVID 5/2020 9/8 PCR NEGATIVE   #Severe Sepsis on admission T>101F, Pulse>90, lactic acidosis,  #Lactic acidosis  #Hyponatremia       RECOMMENDATIONS  - Change abx to Cefazolin 2g q8h IV  - repeat BCX  - TTE    If any questions, please call or send a message on [employer] Teams  Spectra [phone]

## 2020-09-10 NOTE — DIETITIAN INITIAL EVALUATION ADULT. - FACTORS AFF FOOD INTAKE
Pt w/ CP. Spoke to RN at Group Home. TF regimen was Jevity 1.2 @ 240ml x5 feeds/day, 150ml free water flush pre/post via PEG. NKFA. Loose BM today. tolerating feeds today

## 2020-09-10 NOTE — DIETITIAN INITIAL EVALUATION ADULT. - PHYSICAL APPEARANCE
well nourished/BMI 23.2 using stated height 157.4 cm and CBW of 57.8 kg. Dosing weight of 75kg inaccurate.  IBW: 118#  Per weight records at [facility], pt weighed 58.6 kg on 5/21/2020. RN at [facility] says his weight was 114# last month. ? accuracy

## 2020-09-10 NOTE — PHARMACOTHERAPY INTERVENTION NOTE - COMMENTS
s/w nurse- benzonatate cannot be crushed to place in NG tube- recommended if therapy still needed to have md change to robitussin dm soln

## 2020-09-10 NOTE — PROGRESS NOTE ADULT - SUBJECTIVE AND OBJECTIVE BOX
24H events:    Patient is a 64y old Male who presents with a chief complaint of SOB and Cough(09 Sep 2020 13:05)  Primary diagnosis of Sepsis secondary to Pneumonia  Today is hospital day 2d. This morning patient was seen and examined at bedside.  Patient feels better, SOB improved, still having Cough.    PAST MEDICAL & SURGICAL HISTORY  Asthma  Urinary retention  Urinary calculi  Spastic quadriplegia  Osteoporosis  Seizure: last seizure &gt;10 years ago  Cerebral palsy  BPH (benign prostatic hyperplasia)  Suprapubic catheter  H/O cystoscopy  S/P percutaneous endoscopic gastrostomy (PEG) tube placement    SOCIAL HISTORY:  Social History:  Denies x 3 (08 Sep 2020 14:39)      ALLERGIES:  No Known Allergies    MEDICATIONS:  STANDING MEDICATIONS  baclofen 10 milliGRAM(s) Oral three times a day  benzonatate 100 milliGRAM(s) Oral every 8 hours  calcium carbonate   1250 mG (OsCal) 1 Tablet(s) Oral two times a day  cefepime   IVPB 2000 milliGRAM(s) IV Intermittent every 8 hours  enoxaparin Injectable 40 milliGRAM(s) SubCutaneous at bedtime  methylPREDNISolone sodium succinate Injectable 60 milliGRAM(s) IV Push every 12 hours  oxybutynin 5 milliGRAM(s) Oral every 12 hours  PHENobarbital 64.8 milliGRAM(s) Oral daily  polyethylene glycol 3350 Oral Powder - Peds 17 Gram(s) Oral daily  polyvinyl alcohol 1.4%/povidone 0.6% Ophthalmic Solution - Peds 1 Drop(s) Both EYES four times a day  senna 2 Tablet(s) Oral at bedtime  vancomycin  IVPB 1500 milliGRAM(s) IV Intermittent every 12 hours  vancomycin  IVPB        PRN MEDICATIONS    VITALS:   T(F): 97  HR: 62  BP: 101/58  RR: 19  SpO2: 92%    PHYSICAL EXAM:  Not in distress today. OFF O2  EYES: EOMI, PERRLA, conjunctiva and sclera clear  ENMT: No tonsillar erythema, exudates, or enlargement; Moist mucous membranes, very poor dentition, No lesions  NECK: Supple, No JVD, Normal thyroid  NERVOUS SYSTEM:  conscious, cooperative, alert  PULM: Decrease air entry over the LLQ , No wheezes  CARDIAC: S1S2  GI: distended, tender abdomen, bowel sound sluggish, with Peg tube inserted, and supra pubic catheter.  EXTREMITIES:  2+ Peripheral Pulses, extremities in contracture   LYMPH: No lymphadenopathy noted  LABS:                        9.5    6.62  )-----------( 99       ( 10 Sep 2020 06:49 )             28.5         140  |  107  |  16  ----------------------------<  88  4.2   |  25  |  0.5<L>    Ca    8.1<L>      09 Sep 2020 06:43    TPro  5.5<L>  /  Alb  2.8<L>  /  TBili  0.3  /  DBili  x   /  AST  32  /  ALT  25  /  AlkPhos  73        Urinalysis Basic - ( 08 Sep 2020 18:35 )    Color: Tita / Appearance: Slightly Turbid / S.022 / pH: x  Gluc: x / Ketone: Negative  / Bili: Negative / Urobili: <2 mg/dL   Blood: x / Protein: 300 mg/dL / Nitrite: Negative   Leuk Esterase: Large / RBC: 4 /HPF / WBC 90 /HPF   Sq Epi: x / Non Sq Epi: 2 /HPF / Bacteria: Many      ABG - ( 09 Sep 2020 08:44 )  pH, Arterial: 7.52  pH, Blood: x     /  pCO2: 31    /  pO2: 72    / HCO3: 25    / Base Excess: 2.5   /  SaO2: 97                    Culture - Blood (collected 08 Sep 2020 18:35)  Source: .Blood Blood  Gram Stain (09 Sep 2020 09:01):    Growth in aerobic bottle: Gram Positive Cocci in Pairs and Chains and    Gram Positive Cocci in Clusters    Growth in anaerobic bottle: Gram Positive Cocci in Pairs and Chains and    Gram Positive Cocci in Clusters  Preliminary Report (09 Sep 2020 09:02):    Growth in anaerobic bottle: Gram Positive Cocci in Pairs and Chains and    Gram Positive Cocci in Clusters    Growth in aerobic bottle: Gram Positive Cocci in Pairs and Chains and    Gram Positive Cocci in Clusters    "Due to technical problems, Proteus sp. will Not be reported as part of    the BCID panel until further notice"    ***Blood Panel PCR results on this specimen are available    approximately 3 hours after the Gram stain result.***    Gram stain, PCR, and/or culture results may not always    correspond due to difference in methodologies.    ************************************************************    This PCR assay was performed using [employer].    The following targets are tested for: Enterococcus,    vancomycin resistant enterococci, Listeria monocytogenes,    coagulase negative staphylococci, S. aureus,    methicillin resistant S. aureus, Streptococcus agalactiae    (Group B), S. pneumoniae, S. pyogenes (Group A),    Acinetobacter baumannii, Enterobacter cloacae, E. coli,    Klebsiella oxytoca, K. pneumoniae, Proteus sp.,    Serratia marcescens, Haemophilus influenzae,    Neisseria meningitidis, Pseudomonas aeruginosa, Candida    albicans, C. glabrata, C krusei, C parapsilosis,    C. tropicalis and the KPC resistance gene.  Organism: Blood Culture PCR (09 Sep 2020 09:00)  Organism: Blood Culture PCR (09 Sep 2020 09:00)    Culture - Blood (collected 08 Sep 2020 18:35)  Source: .Blood Blood-Peripheral  Gram Stain (09 Sep 2020 10:03):    Growth in anaerobic bottle: Gram Positive Cocci in Clusters and Gram    positive cocci in pairs    Growth in aerobic bottle: Gram Positive Cocci in Clusters and Gram    positive cocci in pairs  Preliminary Report (09 Sep 2020 10:03):    Growth in anaerobic bottle: Gram Positive Cocci in Clusters and Gram    positive cocci in pairs    Growth in aerobic bottle: Gram Positive Cocci in Clusters and Gram    positive cocci in pairs    Culture - Urine (collected 08 Sep 2020 18:35)  Source: .Urine Catheterized  Final Report (09 Sep 2020 21:45):    >=3 organisms. Probable collection contamination.          RADIOLOGY:   Xray Chest 1 View-PORTABLE IMMEDIATE (20 @ 09:54) >  Lung parenchyma/Pleura: Stable bilateral opacities. Elevated left hemidiaphragm.     Xray Kidney Ureter Bladder (20 @ 09:54) >  Nonspecific bowel gas pattern, noting generalized gaseous distention of colon. Similar bowel gas pattern had been seen on remote abdominal radiograph of 2020.  Gastrostomy tube overlying the left upper quadrant.  Osteopenia. Chronic hipdeformities. Degenerative changes of the spine.

## 2020-09-10 NOTE — PROGRESS NOTE ADULT - SUBJECTIVE AND OBJECTIVE BOX
TISH SHAIKH  64y  [facility] [id number] A      Patient is a 64y old  Male who presents with a chief complaint of Sepsis secondary to Pneumonia (10 Sep 2020 10:16)      INTERVAL HPI/OVERNIGHT EVENTS:  no acute events overnight , more alert today      REVIEW OF SYSTEMS:    ROS neg  T(C): 36.1 (09-10-20 @ 07:38), Max: 36.8 (09-09-20 @ 15:40)  HR: 62 (09-10-20 @ 07:38) (62 - 103)  BP: 101/58 (09-10-20 @ 07:38) (98/53 - 107/59)  RR: 19 (09-10-20 @ 07:38) (18 - 19)  SpO2: 92% (09-10-20 @ 07:38) (92% - 93%)  Wt(kg): --Vital Signs Last 24 Hrs  T(C): 36.1 (10 Sep 2020 07:38), Max: 36.8 (09 Sep 2020 15:40)  T(F): 97 (10 Sep 2020 07:38), Max: 98.2 (09 Sep 2020 15:40)  HR: 62 (10 Sep 2020 07:38) (62 - 103)  BP: 101/58 (10 Sep 2020 07:38) (98/53 - 107/59)  BP(mean): --  RR: 19 (10 Sep 2020 07:38) (18 - 19)  SpO2: 92% (10 Sep 2020 07:38) (92% - 93%)    PHYSICAL EXAM:  HEAD:  Atraumatic, Normocephalic  EYES: EOMI, PERRLA, conjunctiva and sclera clear  ENMT: No tonsillar erythema, exudates, or enlargement; Moist mucous membranes, very poor dention dentition, No lesions  NECK: Supple, No JVD, Normal thyroid  NERVOUS SYSTEM:  Alert  PULM: bilateral air entry  CARDIAC: S1S2  GI: Soft, Nontender, + distended  EXTREMITIES:  2+ Peripheral Pulses,  LYMPH: No lymphadenopathy noted    LABS:                            9.5    6.62  )-----------( 99       ( 10 Sep 2020 06:49 )             28.5   09-10    136  |  102  |  19  ----------------------------<  182<H>  4.1   |  25  |  0.5<L>    Ca    8.0<L>      10 Sep 2020 06:49  Mg     2.1     09-10    TPro  5.2<L>  /  Alb  2.7<L>  /  TBili  0.2  /  DBili  x   /  AST  29  /  ALT  22  /  AlkPhos  77  09-10            Culture - Blood (collected 08 Sep 2020 18:35)  Source: .Blood Blood  Gram Stain (09 Sep 2020 09:01):    Growth in aerobic bottle: Gram Positive Cocci in Pairs and Chains and    Gram Positive Cocci in Clusters    Growth in anaerobic bottle: Gram Positive Cocci in Pairs and Chains and    Gram Positive Cocci in Clusters  Preliminary Report (10 Sep 2020 10:28):    Growth in aerobic and anaerobic bottles: Staphylococcus aureus    Growth in aerobic and anaerobic bottles: Streptococcus agalactiae Group B    "Due to technical problems, Proteus sp. will Not be reported as part of    the BCID panel until further notice"    ***Blood Panel PCR results on this specimen are available    approximately 3 hours after the Gram stain result.***    Gram stain, PCR, and/or culture results may not always    correspond due to difference in methodologies.    ************************************************************    This PCR assay was performed using [employer].    The following targets are tested for: Enterococcus,    vancomycin resistant enterococci, Listeria monocytogenes,    coagulase negative staphylococci, S. aureus,    methicillin resistant S. aureus, Streptococcus agalactiae    (Group B), S. pneumoniae, S. pyogenes (Group A),    Acinetobacter baumannii, Enterobacter cloacae, E. coli,    Klebsiella oxytoca, K. pneumoniae, Proteus sp.,    Serratia marcescens, Haemophilus influenzae,    Neisseria meningitidis, Pseudomonas aeruginosa, Candida    albicans, C. glabrata, C krusei, C parapsilosis,    C. tropicalis and the KPC resistance gene.  Organism: Blood Culture PCR (09 Sep 2020 09:00)  Organism: Blood Culture PCR (09 Sep 2020 09:00)    Culture - Blood (collected 08 Sep 2020 18:35)  Source: .Blood Blood-Peripheral  Gram Stain (09 Sep 2020 10:03):    Growth in anaerobic bottle: Gram Positive Cocci in Clusters and Gram    positive cocci in pairs    Growth in aerobic bottle: Gram Positive Cocci in Clusters and Gram    positive cocci in pairs  Preliminary Report (09 Sep 2020 10:03):    Growth in anaerobic bottle: Gram Positive Cocci in Clusters and Gram    positive cocci in pairs    Growth in aerobic bottle: Gram Positive Cocci in Clusters and Gram    positive cocci in pairs    Culture - Urine (collected 08 Sep 2020 18:35)  Source: .Urine Catheterized  Final Report (09 Sep 2020 21:45):    >=3 organisms. Probable collection contamination.      baclofen 10 milliGRAM(s) Oral three times a day  calcium carbonate   1250 mG (OsCal) 1 Tablet(s) Oral two times a day  ceFAZolin   IVPB 2000 milliGRAM(s) IV Intermittent every 8 hours  enoxaparin Injectable 40 milliGRAM(s) SubCutaneous at bedtime  oxybutynin 5 milliGRAM(s) Oral every 12 hours  PHENobarbital 64.8 milliGRAM(s) Oral daily  polyethylene glycol 3350 Oral Powder - Peds 17 Gram(s) Oral daily  polyvinyl alcohol 1.4%/povidone 0.6% Ophthalmic Solution - Peds 1 Drop(s) Both EYES four times a day  senna 2 Tablet(s) Oral at bedtime      HEALTH ISSUES - PROBLEM Dx:          Case Discussed with House Staff     Spectra [phone]

## 2020-09-10 NOTE — PHARMACOTHERAPY INTERVENTION NOTE - COMMENTS
Recommended to re-evaluate Tessalon order, as Tessalon cannot be crushed or opened.  Dr. Franklin said, will follow up

## 2020-09-10 NOTE — DIETITIAN INITIAL EVALUATION ADULT. - OTHER INFO
Pt adm for Sepsis secondary to Pneumonia. Bacteremia - ID following. Seizure Disorder. Asthma. Hx of cerebral palsy. s/p PEG.

## 2020-09-10 NOTE — PROGRESS NOTE ADULT - ASSESSMENT
f/u ID consult 64 y.o. M w/ PMHx  of cerebral palsy from group home, seizure disorder, spastic paraplegia, s/p PEG tube, BPH, bladder neck stricture s/p suprapubic cath with history of ESBL in urine, Asthma; presenting with Fever and non-productive cough     #Sepsis secondary to Pneumonia & Bacteremia   - WBC 11K,  Temp 103F (rectal), HR 118bpm and RR 24, lactate 2.7 (VBG)   - Group home high risk from Pseudomonas and Staph aureus  - Sepsis and lactate level (1.0) resolved  - COVID PCR negative   - Blood culture: Staph Aureus & Group B strep detected   - UA: + Bacteria, +WBC/LE, Hyaline Cast, protein 300 mg/dL   - Urine culture: 3+ organism, possible collection contamination   - c/w Vancomycin; d/c Cefepime   - Decrease Solumedrol   - f/u Urine Legionella, Pneumococcal antigen, MRSA   - f/u culture sensitivity   - f/u Vancomycin trough (before the 4th dose today at 6:00 pm)    - ID:     #Hx of Asthma  - c/w Albuterol and Nebs PRN   - decrease Solumedrol 40 mg IV BID    #Seizure Disorder: c/w Phenobarbital   #Hx of CP: c/w Baclofen     DVT PPX: Lovenox   Full Code 64 y.o. M w/ PMHx  of cerebral palsy from group home, seizure disorder, spastic paraplegia, s/p PEG tube, BPH, bladder neck stricture s/p suprapubic cath with history of ESBL in urine, Asthma; presenting with Fever and non-productive cough     #Sepsis secondary to Pneumonia & Bacteremia   - WBC 11K,  Temp 103F (rectal), HR 118bpm and RR 24, lactate 2.7 (VBG)   - Group home high risk from Pseudomonas and Staph aureus  - Sepsis and lactate level (1.0) resolved  - COVID PCR negative   - Blood culture: Staph Aureus & Group B strep detected   - UA: + Bacteria, +WBC/LE, Hyaline Cast, protein 300 mg/dL   - Urine culture: 3+ organism, possible collection contamination   - c/w Vancomycin; d/c Cefepime   - Decrease Solumedrol   - f/u Urine Legionella, Pneumococcal antigen, MRSA   - f/u culture sensitivity   - f/u Vancomycin trough (before the 4th dose today at 6:00 pm)    - f/u TTE   - ID:     #Hx of Asthma  - c/w Albuterol and Nebs PRN   - decrease Solumedrol 40 mg IV BID    #Seizure Disorder: c/w Phenobarbital   #Hx of CP: c/w Baclofen     DVT PPX: Lovenox   Full Code 64 y.o. M w/ PMHx  of cerebral palsy from group home, seizure disorder, spastic paraplegia, s/p PEG tube, BPH, bladder neck stricture s/p suprapubic cath with history of ESBL in urine, Asthma; presenting with Fever and non-productive cough     #Sepsis secondary to Pneumonia & Bacteremia   - WBC 11K,  Temp 103F (rectal), HR 118bpm and RR 24, lactate 2.7 (VBG)   - Group home high risk from Pseudomonas and Staph aureus  - Sepsis and lactate level (1.0) resolved  - COVID PCR negative   - Blood culture: Staph Aureus & Group B strep detected   - UA: + Bacteria, +WBC/LE, Hyaline Cast, protein 300 mg/dL   - Urine culture: 3+ organism, possible collection contamination   - c/w Vancomycin; d/c Cefepime   - Decrease Solumedrol   - f/u Urine Legionella, Pneumococcal antigen, MRSA   - f/u culture sensitivity   - f/u Vancomycin trough (before the 4th dose today at 6:00 pm)    - f/u TTE   - ID:     #Hx of Asthma  - c/w Albuterol and Nebs PRN   - decrease Solumedrol 40 mg IV BID    #Thrombocytopenia likely reactive to active infection   - Baseline around low 200's   - 112 --> 80 --> 99 (today)  - continue to monitor     #Normocytic anemia likely Chronic Disease  - Hgb 10.5 --> 9.5   - continue to monitor      #Seizure Disorder: c/w Phenobarbital   #Hx of CP: c/w Baclofen     DVT PPX: Lovenox   Full Code 64 y.o. M w/ PMHx  of cerebral palsy from group home, seizure disorder, spastic paraplegia, s/p PEG tube, BPH, bladder neck stricture s/p suprapubic cath with history of ESBL in urine, Asthma; presenting with Fever and non-productive cough     #Sepsis secondary to Pneumonia & Bacteremia   - WBC 11K,  Temp 103F (rectal), HR 118bpm and RR 24, lactate 2.7 (VBG)   - Group home high risk from Pseudomonas and Staph aureus  - Sepsis and lactate level (1.0) resolved  - COVID PCR negative   - Blood culture: Staph Aureus & Group B strep detected   - UA: + Bacteria, +WBC/LE, Hyaline Cast, protein 300 mg/dL   - Urine culture: 3+ organism, possible collection contamination   - c/w Vancomycin; d/c Cefepime   - Decrease Solumedrol   - f/u Urine Legionella, Pneumococcal antigen, MRSA   - f/u culture sensitivity   - f/u Vancomycin trough (before the 4th dose today at 6:00 pm)    - f/u TTE   - ID: Change abx to Cefazolin 2g q8h IV, repeat BCX, f/u TTE      #Hx of Asthma  - c/w Albuterol and Nebs PRN   - decrease Solumedrol 40 mg IV BID    #Thrombocytopenia likely reactive to active infection   - Baseline around low 200's   - 112 --> 80 --> 99 (today)  - continue to monitor     #Normocytic anemia likely Chronic Disease  - Hgb 10.5 --> 9.5   - continue to monitor      #Seizure Disorder: c/w Phenobarbital   #Hx of CP: c/w Baclofen     DVT PPX: Lovenox   Full Code 64 y.o. M w/ PMHx  of cerebral palsy from group home, seizure disorder, spastic paraplegia, s/p PEG tube, BPH, bladder neck stricture s/p suprapubic cath with history of ESBL in urine, Asthma; presenting with Fever and non-productive cough     #Sepsis secondary to Pneumonia & Bacteremia   - Now sepsis resolved  - COVID PCR negative   - S/P Vancomycin and Cefepime   - De-escalated to cefazolin 2g iv q 8h as per ID recommendation  - f/u Urine Legionella, Pneumococcal antigen, MRSA     #Bacteremia  -Blood cultures positive for MSSA  -Likely the source is Pneumonia  -Continue cefazolin   -Repeat blood cultures   -TTE to r/o vegetations with Staph aureus bacteremia     # Asthma  - Was in exacerbation on admission  - c/w Albuterol and Nebs PRN   -s/p IV solumedrol  -Continue prednisone 40 mg po daily for 5 days    #Thrombocytopenia likely reactive to active infection   - Baseline around low 200's   - 112 --> 80 --> 99 (today)  - Patient on Lovenox--> HIT was of a concern but platelets are trending up  vs drug induced vs sepsis related  -Continue monitoring    #Normocytic anemia likely Chronic Disease  - Hgb 10.5 --> 9.5   - continue to monitor      #Seizure Disorder  -c/w Phenobarbital     #Hx of CP  - c/w Baclofen     DVT PPX: Lovenox   Full Code    Addendum:   Patient was seen and examined today  Note was edited as needed  Agree with above

## 2020-09-10 NOTE — CONSULT NOTE ADULT - SUBJECTIVE AND OBJECTIVE BOX
TISH SHAIKH  64y, Male  Allergy: No Known Allergies      CHIEF COMPLAINT: Sepsis secondary to Pneumonia (10 Sep 2020 08:47)      LOS  2d    HPI:  Patient  is a  63 y/o m with Medical Hx  of cerebral palsy from group home (P) , seizure disorder, spastic paraplegia, s/p PEG tube, BPH, bladder neck stricture s/p suprapubic cath, history of ESBL, asthma, from group home presented with Fever and non- Productive cough.   Per Group home staff, Symptoms started last night with low grade fever of 100.5F he was given Tylenol with improvement. However, this morning it was reported that the patient kept coughing all night so the staff called the PMD who requested the patient be sent to ED given prior hx of testing positive for COVID.    In ED pt was septic with Leukocytosis of 11K, BP low 80's,  Tmax: 103F (rectal), tachycardic 118bpm and Tachypneic 24, lactate 2.7 on VBG  -ABG with Respiratory Alkalosis likley from hyperventilation   -S/P 2L LR bolus, duonebs, cefepime and levofloxacin (08 Sep 2020 14:39)      INFECTIOUS DISEASE HISTORY:  BCX with MSSA and GBS   UCX > 3orgs  Hx COVID PNA 2020    PAST MEDICAL & SURGICAL HISTORY:  Asthma  Urinary retention  Urinary calculi  Spastic quadriplegia  Osteoporosis  Seizure: last seizure &gt;10 years ago  Cerebral palsy  BPH (benign prostatic hyperplasia)  Suprapubic catheter  H/O cystoscopy  S/P percutaneous endoscopic gastrostomy (PEG) tube placement      FAMILY HISTORY  Family history unknown  No pertinent family history in first degree relatives      SOCIAL HISTORY  Social History:  Denies x 3 (08 Sep 2020 14:39)        ROS  ***    VITALS:  T(F): 97, Max: 98.2 (20 @ 15:40)  HR: 62  BP: 101/58  RR: 19Vital Signs Last 24 Hrs  T(C): 36.1 (10 Sep 2020 07:38), Max: 36.8 (09 Sep 2020 15:40)  T(F): 97 (10 Sep 2020 07:38), Max: 98.2 (09 Sep 2020 15:40)  HR: 62 (10 Sep 2020 07:38) (62 - 103)  BP: 101/58 (10 Sep 2020 07:38) (98/53 - 107/59)  BP(mean): --  RR: 19 (10 Sep 2020 07:38) (18 - 19)  SpO2: 92% (10 Sep 2020 00:00) (92% - 98%)    PHYSICAL EXAM:  ***    TESTS & MEASUREMENTS:                        9.5    6.62  )-----------( 99       ( 10 Sep 2020 06:49 )             28.5     09-10    136  |  102  |  19  ----------------------------<  182<H>  4.1   |  25  |  0.5<L>    Ca    8.0<L>      10 Sep 2020 06:49  Mg     2.1     09-10    TPro  5.2<L>  /  Alb  2.7<L>  /  TBili  0.2  /  DBili  x   /  AST  29  /  ALT  22  /  AlkPhos  77  09-10    eGFR if Non African American: 115 mL/min/1.73M2 (09-10-20 @ 06:49)  eGFR if : 133 mL/min/1.73M2 (09-10-20 @ 06:49)    LIVER FUNCTIONS - ( 10 Sep 2020 06:49 )  Alb: 2.7 g/dL / Pro: 5.2 g/dL / ALK PHOS: 77 U/L / ALT: 22 U/L / AST: 29 U/L / GGT: x           Urinalysis Basic - ( 08 Sep 2020 18:35 )    Color: Tita / Appearance: Slightly Turbid / S.022 / pH: x  Gluc: x / Ketone: Negative  / Bili: Negative / Urobili: <2 mg/dL   Blood: x / Protein: 300 mg/dL / Nitrite: Negative   Leuk Esterase: Large / RBC: 4 /HPF / WBC 90 /HPF   Sq Epi: x / Non Sq Epi: 2 /HPF / Bacteria: Many        Culture - Blood (collected 20 @ 18:35)  Source: .Blood Blood  Gram Stain (20 @ 09:01):    Growth in aerobic bottle: Gram Positive Cocci in Pairs and Chains and    Gram Positive Cocci in Clusters    Growth in anaerobic bottle: Gram Positive Cocci in Pairs and Chains and    Gram Positive Cocci in Clusters  Preliminary Report (20 @ 09:02):    Growth in anaerobic bottle: Gram Positive Cocci in Pairs and Chains and    Gram Positive Cocci in Clusters    Growth in aerobic bottle: Gram Positive Cocci in Pairs and Chains and    Gram Positive Cocci in Clusters    "Due to technical problems, Proteus sp. will Not be reported as part of    the BCID panel until further notice"    ***Blood Panel PCR results on this specimen are available    approximately 3 hours after the Gram stain result.***    Gram stain, PCR, and/or culture results may not always    correspond due to difference in methodologies.    ************************************************************    This PCR assay was performed using [employer].    The following targets are tested for: Enterococcus,    vancomycin resistant enterococci, Listeria monocytogenes,    coagulase negative staphylococci, S. aureus,    methicillin resistant S. aureus, Streptococcus agalactiae    (Group B), S. pneumoniae, S. pyogenes (Group A),    Acinetobacter baumannii, Enterobacter cloacae, E. coli,    Klebsiella oxytoca, K. pneumoniae, Proteus sp.,    Serratia marcescens, Haemophilus influenzae,    Neisseria meningitidis, Pseudomonas aeruginosa, Candida    albicans, C. glabrata, C krusei, C parapsilosis,    C. tropicalis and the KPC resistance gene.  Organism: Blood Culture PCR (20 @ 09:00)  Organism: Blood Culture PCR (20 @ 09:00)      -  Staphylococcus aureus: Detec Any isolate of Staphylococcus aureus from a blood culture is NOT considered a contaminant.      -  Streptococcus agalactiae (Group B): Detec      Method Type: PCR    Culture - Blood (collected 20 @ 18:35)  Source: .Blood Blood-Peripheral  Gram Stain (20 @ 10:03):    Growth in anaerobic bottle: Gram Positive Cocci in Clusters and Gram    positive cocci in pairs    Growth in aerobic bottle: Gram Positive Cocci in Clusters and Gram    positive cocci in pairs  Preliminary Report (20 @ 10:03):    Growth in anaerobic bottle: Gram Positive Cocci in Clusters and Gram    positive cocci in pairs    Growth in aerobic bottle: Gram Positive Cocci in Clusters and Gram    positive cocci in pairs    Culture - Urine (collected 20 @ 18:35)  Source: .Urine Catheterized  Final Report (20 @ 21:45):    >=3 organisms. Probable collection contamination.    Culture - Blood (collected 20 @ 07:10)  Source: .Blood None  Final Report (20 @ 12:00):    No Growth Final    Culture - Blood (collected 20 @ 06:19)  Source: .Blood None  Final Report (20 @ 12:00):    No Growth Final    Culture - Urine (collected 19 @ 13:40)  Source: .Urine Catheterized  Final Report (10-02-19 @ 20:33):    >100,000 CFU/ml Proteus mirabilis ESBL  Organism: Proteus mirabilis ESBL (10-02-19 @ 20:33)  Organism: Proteus mirabilis ESBL (10-02-19 @ 20:33)      -  Amikacin: S <=16      -  Ampicillin: R >16 These ampicillin results predict results for amoxicillin      -  Ampicillin/Sulbactam: R 16/8 Enterobacter, Citrobacter, and Serratia may develop resistance during prolonged therapy (3-4 days)      -  Aztreonam: R <=4      -  Cefazolin: R >16 ([id number]_[id number]) For uncomplicated UTI with K. pneumoniae, E. coli, or P. mirablis: MIGUEL A <=16 is sensitive and MIGUEL A >=32 is resistant. This also predicts results for oral agents cefaclor, cefdinir, cefpodoxime, cefprozil, cefuroxime axetil, cephalexin and locarbef for uncomplicated UTI. Note that some isolates may be susceptible to these agents while testing resistant to cefazolin.      -  Cefepime: R 8      -  Cefoxitin: S <=8      -  Ceftriaxone: R >32 Enterobacter, Citrobacter, and Serratia may develop resistance during prolonged therapy      -  Ciprofloxacin: R >2      -  Ertapenem: S <=1      -  Gentamicin: S <=4      -  Levofloxacin: R >4      -  Meropenem: S <=1      -  Nitrofurantoin: R >64 Should not be used to treat pyelonephritis      -  Piperacillin/Tazobactam: R <=16      -  Tobramycin: S <=4      -  Trimethoprim/Sulfamethoxazole: R >2/38      Method Type: MIGUEL A        Lactate, Blood: 3.1 mmol/L (20 @ 16:00)  Blood Gas Venous - Lactate: 2.7 mmoL/L (20 @ 12:46)      INFECTIOUS DISEASES TESTING  COVID-19 PCR: NotDetec (20 @ 14:19)  COVID-19 PCR: Detected (20 @ 13:38)  COVID-19 PCR: Detected (20 @ 08:17)  COVID-19 PCR: Detected (20 @ 12:54)  COVID-19 PCR: NotDetec (20 @ 15:33)  Procalcitonin, Serum: 0.22 ng/mL (20 @ 11:41)  COVID-19 PCR: Detected (20 @ 12:20)  COVID-19 PCR: NotDetec (20 @ 08:00)  COVID-19 PCR: NotDetec (20 @ 08:00)  COVID-19 PCR: NotDetec (20 @ 16:35)  Procalcitonin, Serum: 0.09 ng/mL (20 @ 12:17)  Procalcitonin, Serum: 0.13 ng/mL (20 @ 07:20)  Procalcitonin, Serum: 0.10 ng/mL (20 @ 17:09)      RADIOLOGY & ADDITIONAL TESTS:  I have personally reviewed the last Chest xray  CXR      CT      CARDIOLOGY TESTING  12 Lead ECG:   Ventricular Rate 121 BPM    Atrial Rate 121 BPM    P-R Interval 122 ms    QRS Duration 80 ms    Q-T Interval 298 ms    QTC Calculation(Bezet) 423 ms    P Axis 49 degrees    R Axis 6 degrees    T Axis 31 degrees    Diagnosis Line Sinus tachycardia  Nonspecific T wave abnormality  Abnormal ECG    Confirmed by EBENEZER EDDY MD ([id number]) on 2020 3:15:00 PM (20 @ 14:03)      MEDICATIONS  baclofen 10 Oral three times a day  benzonatate 100 Oral every 8 hours  calcium carbonate   1250 mG (OsCal) 1 Oral two times a day  cefepime   IVPB 2000 IV Intermittent every 8 hours  enoxaparin Injectable 40 SubCutaneous at bedtime  methylPREDNISolone sodium succinate Injectable 60 IV Push every 12 hours  oxybutynin 5 Oral every 12 hours  PHENobarbital 64.8 Oral daily  polyethylene glycol 3350 Oral Powder - Peds 17 Oral daily  polyvinyl alcohol 1.4%/povidone 0.6% Ophthalmic Solution - Peds 1 Both EYES four times a day  senna 2 Oral at bedtime  vancomycin  IVPB 1500 IV Intermittent every 12 hours  vancomycin  IVPB         Weight  Weight (kg): 75 (20 @ 13:10)    ANTIBIOTICS:  cefepime   IVPB 2000 milliGRAM(s) IV Intermittent every 8 hours  vancomycin  IVPB 1500 milliGRAM(s) IV Intermittent every 12 hours  vancomycin  IVPB          ALLERGIES:  No Known Allergies TISH SHAIKH  64y, Male  Allergy: No Known Allergies      CHIEF COMPLAINT: Sepsis secondary to Pneumonia (10 Sep 2020 08:47)      LOS  2d    HPI:  Patient  is a  63 y/o m with Medical Hx  of cerebral palsy from group home (P) , seizure disorder, spastic paraplegia, s/p PEG tube, BPH, bladder neck stricture s/p suprapubic cath, history of ESBL, asthma, from group home presented with Fever and non- Productive cough.   Per Group home staff, Symptoms started last night with low grade fever of 100.5F he was given Tylenol with improvement. However, this morning it was reported that the patient kept coughing all night so the staff called the PMD who requested the patient be sent to ED given prior hx of testing positive for COVID.    In ED pt was septic with Leukocytosis of 11K, BP low 80's,  Tmax: 103F (rectal), tachycardic 118bpm and Tachypneic 24, lactate 2.7 on VBG  -ABG with Respiratory Alkalosis likley from hyperventilation   -S/P 2L LR bolus, duonebs, cefepime and levofloxacin (08 Sep 2020 14:39)      INFECTIOUS DISEASE HISTORY:  BCX with MSSA and GBS   UCX > 3orgs  Hx COVID PNA 2020  Patient denies any symptoms  Denies back pain, pain at SPC< pain at PEG    PAST MEDICAL & SURGICAL HISTORY:  Asthma  Urinary retention  Urinary calculi  Spastic quadriplegia  Osteoporosis  Seizure: last seizure &gt;10 years ago  Cerebral palsy  BPH (benign prostatic hyperplasia)  Suprapubic catheter  H/O cystoscopy  S/P percutaneous endoscopic gastrostomy (PEG) tube placement      FAMILY HISTORY  Family history unknown  No pertinent family history in first degree relatives      SOCIAL HISTORY  Social History:  Denies x 3 (08 Sep 2020 14:39)        ROS  General: Denies rigors, nightsweats  HEENT: Denies headache, rhinorrhea, sore throat, eye pain  CV: Denies CP, palpitations  PULM: Denies wheezing, hemoptysis  GI: Denies hematemesis, hematochezia, melena  : Denies discharge, hematuria  MSK: Denies arthralgias, myalgias  SKIN: Denies rash, lesions  NEURO: Denies paresthesias, weakness  PSYCH: Denies depression, anxiety     VITALS:  T(F): 97, Max: 98.2 (20 @ 15:40)  HR: 62  BP: 101/58  RR: 19Vital Signs Last 24 Hrs  T(C): 36.1 (10 Sep 2020 07:38), Max: 36.8 (09 Sep 2020 15:40)  T(F): 97 (10 Sep 2020 07:38), Max: 98.2 (09 Sep 2020 15:40)  HR: 62 (10 Sep 2020 07:38) (62 - 103)  BP: 101/58 (10 Sep 2020 07:38) (98/53 - 107/59)  BP(mean): --  RR: 19 (10 Sep 2020 07:38) (18 - 19)  SpO2: 92% (10 Sep 2020 00:00) (92% - 98%)    PHYSICAL EXAM:  Gen: NAD, resting in bed  HEENT: Normocephalic, atraumatic  Neck: supple, no lymphadenopathy  CV: Regular rate & regular rhythm,. no murmur  Back no paraspinal tenderness to palpation   Lungs: decreased BS at bases, no fremitus  Abdomen: Soft, BS present, PEG clean, SPC clean  Ext: Warm, well perfused  Neuro: non focal, awake  Skin: no rash, no erythema  Lines: no phlebitis     TESTS & MEASUREMENTS:                        9.5    6.62  )-----------( 99       ( 10 Sep 2020 06:49 )             28.5     09-10    136  |  102  |  19  ----------------------------<  182<H>  4.1   |  25  |  0.5<L>    Ca    8.0<L>      10 Sep 2020 06:49  Mg     2.1     09-10    TPro  5.2<L>  /  Alb  2.7<L>  /  TBili  0.2  /  DBili  x   /  AST  29  /  ALT  22  /  AlkPhos  77  09-10    eGFR if Non African American: 115 mL/min/1.73M2 (09-10-20 @ 06:49)  eGFR if : 133 mL/min/1.73M2 (09-10-20 @ 06:49)    LIVER FUNCTIONS - ( 10 Sep 2020 06:49 )  Alb: 2.7 g/dL / Pro: 5.2 g/dL / ALK PHOS: 77 U/L / ALT: 22 U/L / AST: 29 U/L / GGT: x           Urinalysis Basic - ( 08 Sep 2020 18:35 )    Color: Tita / Appearance: Slightly Turbid / S.022 / pH: x  Gluc: x / Ketone: Negative  / Bili: Negative / Urobili: <2 mg/dL   Blood: x / Protein: 300 mg/dL / Nitrite: Negative   Leuk Esterase: Large / RBC: 4 /HPF / WBC 90 /HPF   Sq Epi: x / Non Sq Epi: 2 /HPF / Bacteria: Many        Culture - Blood (collected 20 @ 18:35)  Source: .Blood Blood  Gram Stain (20 @ 09:01):    Growth in aerobic bottle: Gram Positive Cocci in Pairs and Chains and    Gram Positive Cocci in Clusters    Growth in anaerobic bottle: Gram Positive Cocci in Pairs and Chains and    Gram Positive Cocci in Clusters  Preliminary Report (20 @ 09:02):    Growth in anaerobic bottle: Gram Positive Cocci in Pairs and Chains and    Gram Positive Cocci in Clusters    Growth in aerobic bottle: Gram Positive Cocci in Pairs and Chains and    Gram Positive Cocci in Clusters    "Due to technical problems, Proteus sp. will Not be reported as part of    the BCID panel until further notice"    ***Blood Panel PCR results on this specimen are available    approximately 3 hours after the Gram stain result.***    Gram stain, PCR, and/or culture results may not always    correspond due to difference in methodologies.    ************************************************************    This PCR assay was performed using [employer].    The following targets are tested for: Enterococcus,    vancomycin resistant enterococci, Listeria monocytogenes,    coagulase negative staphylococci, S. aureus,    methicillin resistant S. aureus, Streptococcus agalactiae    (Group B), S. pneumoniae, S. pyogenes (Group A),    Acinetobacter baumannii, Enterobacter cloacae, E. coli,    Klebsiella oxytoca, K. pneumoniae, Proteus sp.,    Serratia marcescens, Haemophilus influenzae,    Neisseria meningitidis, Pseudomonas aeruginosa, Candida    albicans, C. glabrata, C krusei, C parapsilosis,    C. tropicalis and the KPC resistance gene.  Organism: Blood Culture PCR (20 @ 09:00)  Organism: Blood Culture PCR (20 @ 09:00)      -  Staphylococcus aureus: Detec Any isolate of Staphylococcus aureus from a blood culture is NOT considered a contaminant.      -  Streptococcus agalactiae (Group B): Detec      Method Type: PCR    Culture - Blood (collected 20 @ 18:35)  Source: .Blood Blood-Peripheral  Gram Stain (20 @ 10:03):    Growth in anaerobic bottle: Gram Positive Cocci in Clusters and Gram    positive cocci in pairs    Growth in aerobic bottle: Gram Positive Cocci in Clusters and Gram    positive cocci in pairs  Preliminary Report (20 @ 10:03):    Growth in anaerobic bottle: Gram Positive Cocci in Clusters and Gram    positive cocci in pairs    Growth in aerobic bottle: Gram Positive Cocci in Clusters and Gram    positive cocci in pairs    Culture - Urine (collected 20 @ 18:35)  Source: .Urine Catheterized  Final Report (20 @ 21:45):    >=3 organisms. Probable collection contamination.    Culture - Blood (collected 20 @ 07:10)  Source: .Blood None  Final Report (20 @ 12:00):    No Growth Final    Culture - Blood (collected 20 @ 06:19)  Source: .Blood None  Final Report (20 @ 12:00):    No Growth Final    Culture - Urine (collected 19 @ 13:40)  Source: .Urine Catheterized  Final Report (10-02-19 @ 20:33):    >100,000 CFU/ml Proteus mirabilis ESBL  Organism: Proteus mirabilis ESBL (10-02-19 @ 20:33)  Organism: Proteus mirabilis ESBL (10-02-19 @ 20:33)      -  Amikacin: S <=16      -  Ampicillin: R >16 These ampicillin results predict results for amoxicillin      -  Ampicillin/Sulbactam: R 16/8 Enterobacter, Citrobacter, and Serratia may develop resistance during prolonged therapy (3-4 days)      -  Aztreonam: R <=4      -  Cefazolin: R >16 (MIC_CL_COM_ENTERIC_CEFAZU) For uncomplicated UTI with K. pneumoniae, E. coli, or P. mirablis: MIGUEL A <=16 is sensitive and MIGUEL A >=32 is resistant. This also predicts results for oral agents cefaclor, cefdinir, cefpodoxime, cefprozil, cefuroxime axetil, cephalexin and locarbef for uncomplicated UTI. Note that some isolates may be susceptible to these agents while testing resistant to cefazolin.      -  Cefepime: R 8      -  Cefoxitin: S <=8      -  Ceftriaxone: R >32 Enterobacter, Citrobacter, and Serratia may develop resistance during prolonged therapy      -  Ciprofloxacin: R >2      -  Ertapenem: S <=1      -  Gentamicin: S <=4      -  Levofloxacin: R >4      -  Meropenem: S <=1      -  Nitrofurantoin: R >64 Should not be used to treat pyelonephritis      -  Piperacillin/Tazobactam: R <=16      -  Tobramycin: S <=4      -  Trimethoprim/Sulfamethoxazole: R >2/38      Method Type: MIGUEL A        Lactate, Blood: 3.1 mmol/L (20 @ 16:00)  Blood Gas Venous - Lactate: 2.7 mmoL/L (20 @ 12:46)      INFECTIOUS DISEASES TESTING  COVID-19 PCR: NotDetec (20 @ 14:19)  COVID-19 PCR: Detected (20 @ 13:38)  COVID-19 PCR: Detected (20 @ 08:17)  COVID-19 PCR: Detected (20 @ 12:54)  COVID-19 PCR: NotDetec (20 @ 15:33)  Procalcitonin, Serum: 0.22 ng/mL (20 @ 11:41)  COVID-19 PCR: Detected (20 @ 12:20)  COVID-19 PCR: NotDetec (20 @ 08:00)  COVID-19 PCR: NotDetec (20 @ 08:00)  COVID-19 PCR: NotDetec (20 @ 16:35)  Procalcitonin, Serum: 0.09 ng/mL (20 @ 12:17)  Procalcitonin, Serum: 0.13 ng/mL (20 @ 07:20)  Procalcitonin, Serum: 0.10 ng/mL (20 @ 17:09)      RADIOLOGY & ADDITIONAL TESTS:  I have personally reviewed the last Chest xray  CXR      CT      CARDIOLOGY TESTING  12 Lead ECG:   Ventricular Rate 121 BPM    Atrial Rate 121 BPM    P-R Interval 122 ms    QRS Duration 80 ms    Q-T Interval 298 ms    QTC Calculation(Bezet) 423 ms    P Axis 49 degrees    R Axis 6 degrees    T Axis 31 degrees    Diagnosis Line Sinus tachycardia  Nonspecific T wave abnormality  Abnormal ECG    Confirmed by EBENEZER EDDY MD ([id number]) on 2020 3:15:00 PM (20 @ 14:03)      MEDICATIONS  baclofen 10 Oral three times a day  benzonatate 100 Oral every 8 hours  calcium carbonate   1250 mG (OsCal) 1 Oral two times a day  cefepime   IVPB 2000 IV Intermittent every 8 hours  enoxaparin Injectable 40 SubCutaneous at bedtime  methylPREDNISolone sodium succinate Injectable 60 IV Push every 12 hours  oxybutynin 5 Oral every 12 hours  PHENobarbital 64.8 Oral daily  polyethylene glycol 3350 Oral Powder - Peds 17 Oral daily  polyvinyl alcohol 1.4%/povidone 0.6% Ophthalmic Solution - Peds 1 Both EYES four times a day  senna 2 Oral at bedtime  vancomycin  IVPB 1500 IV Intermittent every 12 hours  vancomycin  IVPB         Weight  Weight (kg): 75 (20 @ 13:10)    ANTIBIOTICS:  cefepime   IVPB 2000 milliGRAM(s) IV Intermittent every 8 hours  vancomycin  IVPB 1500 milliGRAM(s) IV Intermittent every 12 hours  vancomycin  IVPB          ALLERGIES:  No Known Allergies

## 2020-09-10 NOTE — PROGRESS NOTE ADULT - SUBJECTIVE AND OBJECTIVE BOX
DAILY PROGRESS NOTE  ===========================================================    Patient Information:  TISH SHAIKH  /  64y  /  Male  /  MRN#: [id number]    Hospital Day: 2d     |:::::::::::::::::::::::::::| SUBJECTIVE |:::::::::::::::::::::::::::|  64 y.o. M w/ PMHx  of cerebral palsy from group home ([facility]), seizure disorder, spastic paraplegia, s/p PEG tube, BPH, bladder neck stricture s/p suprapubic cath, history of ESBL, asthma; presented with fever and non-productive cough.   Per Group home staff, symptoms started at night with low grade fever of 100.5F, given Tylenol with improvement; however, coughing continued all night.   ED: septic with Leukocytosis of 11K, BP low 80's, Tmax: 103F (rectal), tachycardic 118bpm and Tachypneic 24, lactate 2.7 on VBG  -ABG with Respiratory Alkalosis likely from hyperventilation   - Given 2L LR bolus, Duoneb cefepime and levofloxacin       OVERNIGHT EVENTS: No acute overnight event.   TODAY: Patient was seen today at bedside. No SOB, chest pain, abd pain.     |:::::::::::::::::::::::::::| OBJECTIVE |:::::::::::::::::::::::::::|    VITAL SIGNS: Last 24 Hours  T(C): 36.1 (10 Sep 2020 07:38), Max: 36.8 (09 Sep 2020 15:40)  T(F): 97 (10 Sep 2020 07:38), Max: 98.2 (09 Sep 2020 15:40)  HR: 62 (10 Sep 2020 07:38) (62 - 103)  BP: 101/58 (10 Sep 2020 07:38) (98/53 - 107/59)  BP(mean): --  RR: 19 (10 Sep 2020 07:38) (18 - 19)  SpO2: 92% (10 Sep 2020 00:00) (92% - 98%)    20 @ 07:01  -  09-10-20 @ 07:00  --------------------------------------------------------  IN: 480 mL / OUT: 2550 mL / NET: -2070 mL      PHYSICAL EXAM:  GENERAL:   Awake, alert; NAD.  HEENT: White patches on tongue. Sclera anicteric.   CARDIO:   Regular rate; Regular rhythm; S1 & S2.  RESP:   No rales, wheezing, or rhonchi appreciated.  GI:  PEG tube. Active BS; No guarding; No rebound tenderness.  EXT: UE in contracted position.     LAB RESULTS:                        9.5    6.62  )-----------( 99       ( 10 Sep 2020 06:49 )             28.5     10    136  |  102  |  19  ----------------------------<  182<H>  4.1   |  25  |  0.5<L>    Ca    8.0<L>      10 Sep 2020 06:49  Mg     2.1     09-10    TPro  5.2<L>  /  Alb  2.7<L>  /  TBili  0.2  /  DBili  x   /  AST  29  /  ALT  22  /  AlkPhos  77  0910      Urinalysis Basic - ( 08 Sep 2020 18:35 )    Color: Tita / Appearance: Slightly Turbid / S.022 / pH: x  Gluc: x / Ketone: Negative  / Bili: Negative / Urobili: <2 mg/dL   Blood: x / Protein: 300 mg/dL / Nitrite: Negative   Leuk Esterase: Large / RBC: 4 /HPF / WBC 90 /HPF   Sq Epi: x / Non Sq Epi: 2 /HPF / Bacteria: Many      ABG - ( 09 Sep 2020 08:44 )  pH, Arterial: 7.52  pH, Blood: x     /  pCO2: 31    /  pO2: 72    / HCO3: 25    / Base Excess: 2.5   /  SaO2: 97                      MICROBIOLOGY:    Culture - Blood (collected 08 Sep 2020 18:35)  Source: .Blood Blood  Gram Stain (09 Sep 2020 09:01):    Growth in aerobic bottle: Gram Positive Cocci in Pairs and Chains and    Gram Positive Cocci in Clusters    Growth in anaerobic bottle: Gram Positive Cocci in Pairs and Chains and    Gram Positive Cocci in Clusters  Preliminary Report (09 Sep 2020 09:02):    Growth in anaerobic bottle: Gram Positive Cocci in Pairs and Chains and    Gram Positive Cocci in Clusters    Growth in aerobic bottle: Gram Positive Cocci in Pairs and Chains and    Gram Positive Cocci in Clusters    "Due to technical problems, Proteus sp. will Not be reported as part of    the BCID panel until further notice"    ***Blood Panel PCR results on this specimen are available    approximately 3 hours after the Gram stain result.***    Gram stain, PCR, and/or culture results may not always    correspond due to difference in methodologies.    ************************************************************    This PCR assay was performed using [employer].    The following targets are tested for: Enterococcus,    vancomycin resistant enterococci, Listeria monocytogenes,    coagulase negative staphylococci, S. aureus,    methicillin resistant S. aureus, Streptococcus agalactiae    (Group B), S. pneumoniae, S. pyogenes (Group A),    Acinetobacter baumannii, Enterobacter cloacae, E. coli,    Klebsiella oxytoca, K. pneumoniae, Proteus sp.,    Serratia marcescens, Haemophilus influenzae,    Neisseria meningitidis, Pseudomonas aeruginosa, Candida    albicans, C. glabrata, C krusei, C parapsilosis,    C. tropicalis and the KPC resistance gene.  Organism: Blood Culture PCR (09 Sep 2020 09:00)  Organism: Blood Culture PCR (09 Sep 2020 09:00)    Culture - Blood (collected 08 Sep 2020 18:35)  Source: .Blood Blood-Peripheral  Gram Stain (09 Sep 2020 10:03):    Growth in anaerobic bottle: Gram Positive Cocci in Clusters and Gram    positive cocci in pairs    Growth in aerobic bottle: Gram Positive Cocci in Clusters and Gram    positive cocci in pairs  Preliminary Report (09 Sep 2020 10:03):    Growth in anaerobic bottle: Gram Positive Cocci in Clusters and Gram    positive cocci in pairs    Growth in aerobic bottle: Gram Positive Cocci in Clusters and Gram    positive cocci in pairs    Culture - Urine (collected 08 Sep 2020 18:35)  Source: .Urine Catheterized  Final Report (09 Sep 2020 21:45):    >=3 organisms. Probable collection contamination.      RADIOLOGY:  < from: Xray Chest 1 View-PORTABLE IMMEDIATE (20 @ 09:54) >  Lung parenchyma/Pleura: Stable bilateral opacities. Elevated left hemidiaphragm.    < end of copied text >        < from: Xray Kidney Ureter Bladder (20 @ 09:54) >  Nonspecific bowel gas pattern, noting generalized gaseous distention of colon. Similar bowel gas pattern had been seen on remote abdominal radiograph of 2020.    Gastrostomy tube overlying the left upper quadrant.      Osteopenia. Chronic hipdeformities. Degenerative changes of the spine.    < end of copied text >      ALLERGIES:  No Known Allergies      =========================================================== DAILY PROGRESS NOTE  ===========================================================    Patient Information:  TISH SHAIKH  /  64y  /  Male  /  MRN#: [id number]    Hospital Day: 2d     |:::::::::::::::::::::::::::| SUBJECTIVE |:::::::::::::::::::::::::::|  64 y.o. M w/ PMHx  of cerebral palsy from group home ([facility]), seizure disorder, spastic paraplegia, s/p PEG tube, BPH, bladder neck stricture s/p suprapubic cath, history of ESBL, asthma; presented with fever and non-productive cough.   Per Group home staff, symptoms started at night with low grade fever of 100.5F, given Tylenol with improvement; however, coughing continued all night.   ED: septic with Leukocytosis of 11K, BP low 80's, Tmax: 103F (rectal), tachycardic 118bpm and Tachypneic 24, 94% O2 sat   - Px: bilateral rhonchi and expiratory wheezing   - Lactate 2.7 on VBG  - ABG with Respiratory Alkalosis likely from hyperventilation   - Given 2L LR bolus, Duoneb, Cefepime and Levofloxacin       OVERNIGHT EVENTS: No acute overnight event.   TODAY: Patient was seen today at bedside. No SOB, chest pain, abd pain.     |:::::::::::::::::::::::::::| OBJECTIVE |:::::::::::::::::::::::::::|    VITAL SIGNS: Last 24 Hours  T(C): 36.1 (10 Sep 2020 07:38), Max: 36.8 (09 Sep 2020 15:40)  T(F): 97 (10 Sep 2020 07:38), Max: 98.2 (09 Sep 2020 15:40)  HR: 62 (10 Sep 2020 07:38) (62 - 103)  BP: 101/58 (10 Sep 2020 07:38) (98/53 - 107/59)  BP(mean): --  RR: 19 (10 Sep 2020 07:38) (18 - 19)  SpO2: 92% (10 Sep 2020 00:00) (92% - 98%)    20 @ 07:01  -  09-10-20 @ 07:00  --------------------------------------------------------  IN: 480 mL / OUT: 2550 mL / NET: -2070 mL      PHYSICAL EXAM:  GENERAL:   Awake, alert; NAD.  HEENT: White patches on tongue. Sclera anicteric.   CARDIO:   Regular rate; Regular rhythm; S1 & S2.  RESP:   No rales, wheezing, or rhonchi appreciated.  GI:  PEG tube. Active BS; No guarding; No rebound tenderness.  EXT: UE in contracted position.     LAB RESULTS:                        9.5    6.62  )-----------( 99       ( 10 Sep 2020 06:49 )             28.5     09-10    136  |  102  |  19  ----------------------------<  182<H>  4.1   |  25  |  0.5<L>    Ca    8.0<L>      10 Sep 2020 06:49  Mg     2.1     10    TPro  5.2<L>  /  Alb  2.7<L>  /  TBili  0.2  /  DBili  x   /  AST  29  /  ALT  22  /  AlkPhos  77  09-10      Urinalysis Basic - ( 08 Sep 2020 18:35 )    Color: Tita / Appearance: Slightly Turbid / S.022 / pH: x  Gluc: x / Ketone: Negative  / Bili: Negative / Urobili: <2 mg/dL   Blood: x / Protein: 300 mg/dL / Nitrite: Negative   Leuk Esterase: Large / RBC: 4 /HPF / WBC 90 /HPF   Sq Epi: x / Non Sq Epi: 2 /HPF / Bacteria: Many      ABG - ( 09 Sep 2020 08:44 )  pH, Arterial: 7.52  pH, Blood: x     /  pCO2: 31    /  pO2: 72    / HCO3: 25    / Base Excess: 2.5   /  SaO2: 97                      MICROBIOLOGY:    Culture - Blood (collected 08 Sep 2020 18:35)  Source: .Blood Blood  Gram Stain (09 Sep 2020 09:01):    Growth in aerobic bottle: Gram Positive Cocci in Pairs and Chains and    Gram Positive Cocci in Clusters    Growth in anaerobic bottle: Gram Positive Cocci in Pairs and Chains and    Gram Positive Cocci in Clusters  Preliminary Report (09 Sep 2020 09:02):    Growth in anaerobic bottle: Gram Positive Cocci in Pairs and Chains and    Gram Positive Cocci in Clusters    Growth in aerobic bottle: Gram Positive Cocci in Pairs and Chains and    Gram Positive Cocci in Clusters    "Due to technical problems, Proteus sp. will Not be reported as part of    the BCID panel until further notice"    ***Blood Panel PCR results on this specimen are available    approximately 3 hours after the Gram stain result.***    Gram stain, PCR, and/or culture results may not always    correspond due to difference in methodologies.    ************************************************************    This PCR assay was performed using [email or web address].    The following targets are tested for: Enterococcus,    vancomycin resistant enterococci, Listeria monocytogenes,    coagulase negative staphylococci, S. aureus,    methicillin resistant S. aureus, Streptococcus agalactiae    (Group B), S. pneumoniae, S. pyogenes (Group A),    Acinetobacter baumannii, Enterobacter cloacae, E. coli,    Klebsiella oxytoca, K. pneumoniae, Proteus sp.,    Serratia marcescens, Haemophilus influenzae,    Neisseria meningitidis, Pseudomonas aeruginosa, Candida    albicans, C. glabrata, C krusei, C parapsilosis,    C. tropicalis and the KPC resistance gene.  Organism: Blood Culture PCR (09 Sep 2020 09:00)  Organism: Blood Culture PCR (09 Sep 2020 09:00)    Culture - Blood (collected 08 Sep 2020 18:35)  Source: .Blood Blood-Peripheral  Gram Stain (09 Sep 2020 10:03):    Growth in anaerobic bottle: Gram Positive Cocci in Clusters and Gram    positive cocci in pairs    Growth in aerobic bottle: Gram Positive Cocci in Clusters and Gram    positive cocci in pairs  Preliminary Report (09 Sep 2020 10:03):    Growth in anaerobic bottle: Gram Positive Cocci in Clusters and Gram    positive cocci in pairs    Growth in aerobic bottle: Gram Positive Cocci in Clusters and Gram    positive cocci in pairs    Culture - Urine (collected 08 Sep 2020 18:35)  Source: .Urine Catheterized  Final Report (09 Sep 2020 21:45):    >=3 organisms. Probable collection contamination.      RADIOLOGY:  < from: Xray Chest 1 View-PORTABLE IMMEDIATE (20 @ 09:54) >  Lung parenchyma/Pleura: Stable bilateral opacities. Elevated left hemidiaphragm.    < end of copied text >        < from: Xray Kidney Ureter Bladder (20 @ 09:54) >  Nonspecific bowel gas pattern, noting generalized gaseous distention of colon. Similar bowel gas pattern had been seen on remote abdominal radiograph of 2020.    Gastrostomy tube overlying the left upper quadrant.      Osteopenia. Chronic hipdeformities. Degenerative changes of the spine.    < end of copied text >      ALLERGIES:  No Known Allergies      ===========================================================

## 2020-09-10 NOTE — DIETITIAN INITIAL EVALUATION ADULT. - ADD RECOMMEND
Continue Jevity 1.2 @ 240ml x 5 feeds/day (1440 kcals, 66 gms protein, 972 ml free water). Flushes per LIP

## 2020-09-10 NOTE — PROGRESS NOTE ADULT - ASSESSMENT
HPI:  Patient  is a  65 y/o m with Medical Hx  of cerebral palsy from group home ([facility]) , seizure disorder, spastic paraplegia, s/p PEG tube, BPH, bladder neck stricture s/p suprapubic cath, history of ESBL, asthma, from group home presented with Fever and non- Productive cough.   Per [facility] staff, Symptoms started last night with low grade fever of 100.5F he was given Tylenol with improvement. However, this morning it was reported that the patient kept coughing all night so the staff called the PMD who requested the patient be sent to ED given prior hx of testing positive for COVID.    In ED pt was septic with Leukocytosis of 11K, BP low 80's,  Tmax: 103F (rectal), tachycardic 118bpm and Tachypneic 24, lactate 2.7 on VBG  -ABG with Respiratory Alkalosis likley from hyperventilation   -S/P 2L LR bolus, duonebs, cefepime and levofloxacin (08 Sep 2020 14:39)    #Severe sepsis poa secondary to bacteremia / uti , with history of stent replacement (5/26)/ nephrolithiasis   sepsis resolved  daily blood cultures   id following tte  cefazolin    #COVID hx - resolved, latest swab negative    Intellectual disability secondary to cerebral palsy    # Functional quadriplegia - full care    # Asthma - stable ,change to po prednisone     Progress Note Handoff    Pending: tte , daily blood cultures    Family discussion: kaye aware of plan of care    Disposition: Group home

## 2020-09-10 NOTE — PROGRESS NOTE ADULT - ASSESSMENT
65 y/o m with Medical Hx  of cerebral palsy from group home, seizure disorder, spastic paraplegia, s/p PEG tube, BPH, bladder neck stricture s/p suprapubic cath with history of ESBL in urine, Asthma, from group home presented with Fever and Productive cough     #Pneumonia  -Septic on admission :Leukocytosis of 11K,  T 103F (rectal), tachycardic 118bpm and Tachypneic 24, lactate 2.7  -Sepsis resolved  -COVID ruled out  -Patient from nursing home high risk from Pseudomonas and staph aureus  -ABG: Acute respiratory respiratory Alkalosis   -S/P Cefepime, Levaquin and 2L LR bolus in ED   -Continue with vancomycin and cefepime  -Solumedrol IV q 12h   -F/up Urine Legionella, Pneumococcal  antigen      #Bacteremia  -Gram positive cocci in chains and clusters  -Culture - Blood (09.08.20 @ 18:35)    -  Staphylococcus aureus: Detec Any isolate of Staphylococcus aureus from a blood culture is NOT considered a contaminant.    -  Streptococcus agalactiae (Group B): Detec    -On vancomycin IV  -Vancomycin level before the 4th dose today at 6:00 pm  -Follow culture sensitivities  -Consult ID    #Seizure Disorder   -c/w Phenobarbital     Asthma:  - c/w Albuterol and Nebs PRN   - Solumedrol 40 mg IV BID    DVT PPX: Lovenox   GI PPX: not indicated   Full Code 63 y/o m with Medical Hx  of cerebral palsy from group home, seizure disorder, spastic paraplegia, s/p PEG tube, BPH, bladder neck stricture s/p suprapubic cath with history of ESBL in urine, Asthma, from group home presented with Fever and Productive cough     #Pneumonia  -Septic on admission :Leukocytosis of 11K,  T 103F (rectal), tachycardic 118bpm and Tachypneic 24, lactate 2.7  -Sepsis resolved  -COVID ruled out  -Patient from nursing home high risk from Pseudomonas and staph aureus  -ABG: Acute respiratory respiratory Alkalosis   -S/P Cefepime, Levaquin and 2L LR bolus in ED   -Continue with vancomycin and cefepime  -Solumedrol IV q 12h   -F/up Urine Legionella, Pneumococcal  antigen      #Bacteremia  -Gram positive cocci in chains and clusters  -Culture - Blood (09.08.20 @ 18:35)    -  Staphylococcus aureus: Detec Any isolate of Staphylococcus aureus from a blood culture is NOT considered a contaminant.    -  Streptococcus agalactiae (Group B): Detec    -On vancomycin IV  -Vancomycin level before the 4th dose today at 6:00 pm  -Follow culture sensitivities  -Consult ID    #Seizure Disorder   -c/w Phenobarbital     Asthma:  - c/w Albuterol and Nebs PRN   - dc Solumedrol 40 mg IV BID  -Prednisone 40 mg daily    DVT PPX: Lovenox   GI PPX: not indicated   Full Code

## 2020-09-11 LAB
-  AMPICILLIN/SULBACTAM: SIGNIFICANT CHANGE UP
-  CEFAZOLIN: SIGNIFICANT CHANGE UP
-  CLINDAMYCIN: SIGNIFICANT CHANGE UP
-  ERYTHROMYCIN: SIGNIFICANT CHANGE UP
-  GENTAMICIN: SIGNIFICANT CHANGE UP
-  OXACILLIN: SIGNIFICANT CHANGE UP
-  PENICILLIN: SIGNIFICANT CHANGE UP
-  RIFAMPIN: SIGNIFICANT CHANGE UP
-  TETRACYCLINE: SIGNIFICANT CHANGE UP
-  TRIMETHOPRIM/SULFAMETHOXAZOLE: SIGNIFICANT CHANGE UP
-  VANCOMYCIN: SIGNIFICANT CHANGE UP
ALBUMIN SERPL ELPH-MCNC: 2.8 G/DL — LOW (ref 3.5–5.2)
ALP SERPL-CCNC: 82 U/L — SIGNIFICANT CHANGE UP (ref 30–115)
ALT FLD-CCNC: 34 U/L — SIGNIFICANT CHANGE UP (ref 0–41)
ANION GAP SERPL CALC-SCNC: 10 MMOL/L — SIGNIFICANT CHANGE UP (ref 7–14)
AST SERPL-CCNC: 37 U/L — SIGNIFICANT CHANGE UP (ref 0–41)
BILIRUB SERPL-MCNC: <0.2 MG/DL — SIGNIFICANT CHANGE UP (ref 0.2–1.2)
BUN SERPL-MCNC: 20 MG/DL — SIGNIFICANT CHANGE UP (ref 10–20)
CALCIUM SERPL-MCNC: 8 MG/DL — LOW (ref 8.5–10.1)
CHLORIDE SERPL-SCNC: 100 MMOL/L — SIGNIFICANT CHANGE UP (ref 98–110)
CO2 SERPL-SCNC: 28 MMOL/L — SIGNIFICANT CHANGE UP (ref 17–32)
CREAT SERPL-MCNC: 0.5 MG/DL — LOW (ref 0.7–1.5)
GLUCOSE BLDC GLUCOMTR-MCNC: 102 MG/DL — HIGH (ref 70–99)
GLUCOSE BLDC GLUCOMTR-MCNC: 126 MG/DL — HIGH (ref 70–99)
GLUCOSE BLDC GLUCOMTR-MCNC: 131 MG/DL — HIGH (ref 70–99)
GLUCOSE BLDC GLUCOMTR-MCNC: 149 MG/DL — HIGH (ref 70–99)
GLUCOSE SERPL-MCNC: 113 MG/DL — HIGH (ref 70–99)
HCT VFR BLD CALC: 32.2 % — LOW (ref 42–52)
HGB BLD-MCNC: 10.6 G/DL — LOW (ref 14–18)
MCHC RBC-ENTMCNC: 30.7 PG — SIGNIFICANT CHANGE UP (ref 27–31)
MCHC RBC-ENTMCNC: 32.9 G/DL — SIGNIFICANT CHANGE UP (ref 32–37)
MCV RBC AUTO: 93.3 FL — SIGNIFICANT CHANGE UP (ref 80–94)
METHOD TYPE: SIGNIFICANT CHANGE UP
NRBC # BLD: 0 /100 WBCS — SIGNIFICANT CHANGE UP (ref 0–0)
PLATELET # BLD AUTO: 163 K/UL — SIGNIFICANT CHANGE UP (ref 130–400)
POTASSIUM SERPL-MCNC: 3.8 MMOL/L — SIGNIFICANT CHANGE UP (ref 3.5–5)
POTASSIUM SERPL-SCNC: 3.8 MMOL/L — SIGNIFICANT CHANGE UP (ref 3.5–5)
PROT SERPL-MCNC: 5.5 G/DL — LOW (ref 6–8)
RBC # BLD: 3.45 M/UL — LOW (ref 4.7–6.1)
RBC # FLD: 13.2 % — SIGNIFICANT CHANGE UP (ref 11.5–14.5)
SODIUM SERPL-SCNC: 138 MMOL/L — SIGNIFICANT CHANGE UP (ref 135–146)
WBC # BLD: 5.47 K/UL — SIGNIFICANT CHANGE UP (ref 4.8–10.8)
WBC # FLD AUTO: 5.47 K/UL — SIGNIFICANT CHANGE UP (ref 4.8–10.8)

## 2020-09-11 PROCEDURE — 93306 TTE W/DOPPLER COMPLETE: CPT | Mod: 26

## 2020-09-11 PROCEDURE — 99233 SBSQ HOSP IP/OBS HIGH 50: CPT

## 2020-09-11 RX ADMIN — Medication 10 MILLIGRAM(S): at 15:18

## 2020-09-11 RX ADMIN — Medication 1 DROP(S): at 18:02

## 2020-09-11 RX ADMIN — Medication 5 MILLIGRAM(S): at 05:01

## 2020-09-11 RX ADMIN — POLYETHYLENE GLYCOL 3350 17 GRAM(S): 17 POWDER, FOR SOLUTION ORAL at 12:48

## 2020-09-11 RX ADMIN — Medication 10 MILLIGRAM(S): at 21:41

## 2020-09-11 RX ADMIN — Medication 1 DROP(S): at 00:20

## 2020-09-11 RX ADMIN — ENOXAPARIN SODIUM 40 MILLIGRAM(S): 100 INJECTION SUBCUTANEOUS at 21:40

## 2020-09-11 RX ADMIN — Medication 1 DROP(S): at 12:48

## 2020-09-11 RX ADMIN — Medication 40 MILLIGRAM(S): at 05:01

## 2020-09-11 RX ADMIN — Medication 5 MILLIGRAM(S): at 18:02

## 2020-09-11 RX ADMIN — SENNA PLUS 2 TABLET(S): 8.6 TABLET ORAL at 21:41

## 2020-09-11 RX ADMIN — Medication 1 TABLET(S): at 05:01

## 2020-09-11 RX ADMIN — Medication 64.8 MILLIGRAM(S): at 12:47

## 2020-09-11 RX ADMIN — Medication 10 MILLIGRAM(S): at 05:01

## 2020-09-11 RX ADMIN — Medication 100 MILLIGRAM(S): at 05:00

## 2020-09-11 RX ADMIN — Medication 1 DROP(S): at 05:01

## 2020-09-11 RX ADMIN — Medication 1 TABLET(S): at 18:01

## 2020-09-11 RX ADMIN — Medication 100 MILLIGRAM(S): at 15:19

## 2020-09-11 NOTE — PROGRESS NOTE ADULT - ASSESSMENT
HPI:  Patient  is a  63 y/o m with Medical Hx  of cerebral palsy from group home ([facility]) , seizure disorder, spastic paraplegia, s/p PEG tube, BPH, bladder neck stricture s/p suprapubic cath, history of ESBL, asthma, from group home presented with Fever and non- Productive cough.   Per Group home staff, Symptoms started last night with low grade fever of 100.5F he was given Tylenol with improvement. However, this morning it was reported that the patient kept coughing all night so the staff called the PMD who requested the patient be sent to ED given prior hx of testing positive for COVID.    In ED pt was septic with Leukocytosis of 11K, BP low 80's,  Tmax: 103F (rectal), tachycardic 118bpm and Tachypneic 24, lactate 2.7 on VBG  -ABG with Respiratory Alkalosis likley from hyperventilation   -S/P 2L LR bolus, duonebs, cefepime and levofloxacin (08 Sep 2020 14:39)    #Severe sepsis poa secondary to bacteremia / uti , with history of stent replacement (5/26)/ nephrolithiasis   sepsis resolved  appreciate id follow up   will need iv abx upon dc   awaiting repeat blood cultures , once negative will begin dc planning      #COVID hx - resolved, latest swab negative    #Intellectual disability secondary to cerebral palsy    # Functional quadriplegia - full care    # Asthma - stable ,change to po prednisone     Progress Note Handoff    Pending: blood culture  results     Family discussion: kaye aware of plan of care    Disposition: Group home

## 2020-09-11 NOTE — PROGRESS NOTE ADULT - SUBJECTIVE AND OBJECTIVE BOX
DAILY PROGRESS NOTE  ===========================================================    Patient Information:  TISH SHAIKH  /  64y  /  Male  /  MRN#: [id number]    Hospital Day: 3d     |:::::::::::::::::::::::::::| SUBJECTIVE |:::::::::::::::::::::::::::|  64 y.o. M w/ PMHx  of cerebral palsy from group home ([facility]), seizure disorder, spastic paraplegia, s/p PEG tube, BPH, bladder neck stricture s/p suprapubic cath, history of ESBL, asthma; presented with fever and non-productive cough associated with SOB.   Per Group home staff, symptoms started at night with low grade fever of 100.5F, given Tylenol with improvement; however, coughing continued all night.   ED: septic with Leukocytosis of 11K, BP low 80's, Tmax: 103F (rectal), tachycardic 118bpm and Tachypneic 24, 94% O2 sat   - Px: bilateral rhonchi and expiratory wheezing   - Lactate 2.7 on VBG  - ABG with Respiratory Alkalosis likely from hyperventilation   - Given 2L LR bolus, Duoneb, Cefepime and Levofloxacin     OVERNIGHT EVENTS: No acute overnight events   TODAY: Patient was seen today at bedside. No SOB, chest pain, abd pain.     |:::::::::::::::::::::::::::| OBJECTIVE |:::::::::::::::::::::::::::|    VITAL SIGNS: Last 24 Hours  T(C): 36 (11 Sep 2020 07:39), Max: 36.1 (11 Sep 2020 00:00)  T(F): 96.8 (11 Sep 2020 07:39), Max: 96.9 (11 Sep 2020 00:00)  HR: 81 (11 Sep 2020 07:39) (73 - 81)  BP: 113/64 (11 Sep 2020 07:39) (100/55 - 113/64)  BP(mean): --  RR: 18 (11 Sep 2020 08:28) (18 - 18)  SpO2: 95% (11 Sep 2020 08:28) (93% - 95%)    09-10-20 @ 07:01  -  09-11-20 @ 07:00  --------------------------------------------------------  IN: 1360 mL / OUT: 1650 mL / NET: -290 mL      PHYSICAL EXAM:  GENERAL:   Awake, alert; NAD.  HEENT: White patches on tongue. Sclera anicteric.   CARDIO:   Regular rate; Regular rhythm; S1 & S2.  RESP:   No rales, wheezing, or rhonchi appreciated.  GI:  PEG tube. Active BS; No guarding; No rebound tenderness.  EXT: UE in contracted position.       LAB RESULTS:                        10.6   5.47  )-----------( 163      ( 11 Sep 2020 06:19 )             32.2     09-11    138  |  100  |  20  ----------------------------<  113<H>  3.8   |  28  |  0.5<L>    Ca    8.0<L>      11 Sep 2020 06:19  Mg     2.1     09-10    TPro  5.5<L>  /  Alb  2.8<L>  /  TBili  <0.2  /  DBili  x   /  AST  37  /  ALT  34  /  AlkPhos  82  09-11                MICROBIOLOGY:    Culture - Blood (collected 08 Sep 2020 18:35)  Source: .Blood Blood  Gram Stain (09 Sep 2020 09:01):    Growth in aerobic bottle: Gram Positive Cocci in Pairs and Chains and    Gram Positive Cocci in Clusters    Growth in anaerobic bottle: Gram Positive Cocci in Pairs and Chains and    Gram Positive Cocci in Clusters  Preliminary Report (10 Sep 2020 10:28):    Growth in aerobic and anaerobic bottles: Staphylococcus aureus    Growth in aerobic and anaerobic bottles: Streptococcus agalactiae Group B    "Due to technical problems, Proteus sp. will Not be reported as part of    the BCID panel until further notice"    ***Blood Panel PCR results on this specimen are available    approximately 3 hours after the Gram stain result.***    Gram stain, PCR, and/or culture results may not always    correspond due to difference in methodologies.    ************************************************************    This PCR assay was performed using [employer].    The following targets are tested for: Enterococcus,    vancomycin resistant enterococci, Listeria monocytogenes,    coagulase negative staphylococci, S. aureus,    methicillin resistant S. aureus, Streptococcus agalactiae    (Group B), S. pneumoniae, S. pyogenes (Group A),    Acinetobacter baumannii, Enterobacter cloacae, E. coli,    Klebsiella oxytoca, K. pneumoniae, Proteus sp.,    Serratia marcescens, Haemophilus influenzae,    Neisseria meningitidis, Pseudomonas aeruginosa, Candida    albicans, C. glabrata, C krusei, C parapsilosis,    C. tropicalis and the KPC resistance gene.  Organism: Blood Culture PCR (09 Sep 2020 09:00)  Organism: Blood Culture PCR (09 Sep 2020 09:00)    Culture - Blood (collected 08 Sep 2020 18:35)  Source: .Blood Blood-Peripheral  Gram Stain (09 Sep 2020 10:03):    Growth in anaerobic bottle: Gram Positive Cocci in Clusters and Gram    positive cocci in pairs    Growth in aerobic bottle: Gram Positive Cocci in Clusters and Gram    positive cocci in pairs  Preliminary Report (10 Sep 2020 18:54):    Growth in aerobic and anaerobic bottles: Staphylococcus aureus    Growth in aerobic and anaerobic bottles: Streptococcus agalactiae Group B    See previous culture [id number]    Culture - Urine (collected 08 Sep 2020 18:35)  Source: .Urine Catheterized  Final Report (09 Sep 2020 21:45):    >=3 organisms. Probable collection contamination.      RADIOLOGY:  < from: Xray Chest 1 View-PORTABLE IMMEDIATE (09.09.20 @ 09:54) >  Lung parenchyma/Pleura: Stable bilateral opacities. Elevated left hemidiaphragm.    < end of copied text >        < from: Xray Kidney Ureter Bladder (09.09.20 @ 09:54) >  Nonspecific bowel gas pattern, noting generalized gaseous distention of colon. Similar bowel gas pattern had been seen on remote abdominal radiograph of 5/21/2020.    Gastrostomy tube overlying the left upper quadrant.      Osteopenia. Chronic hipdeformities. Degenerative changes of the spine.    < end of copied text >            ALLERGIES:  No Known Allergies      ===========================================================

## 2020-09-11 NOTE — PROGRESS NOTE ADULT - SUBJECTIVE AND OBJECTIVE BOX
TISH SHAIKH  64y  [facility] [id number] A      Patient is a 64y old  Male who presents with a chief complaint of Sepsis secondary to Pneumonia (11 Sep 2020 10:46)      INTERVAL HPI/OVERNIGHT EVENTS:  no acute events overnight       REVIEW OF SYSTEMS:  ROS neg    T(C): 36 (09-11-20 @ 07:39), Max: 36.1 (09-11-20 @ 00:00)  HR: 81 (09-11-20 @ 07:39) (73 - 81)  BP: 113/64 (09-11-20 @ 07:39) (100/55 - 113/64)  RR: 18 (09-11-20 @ 08:28) (18 - 18)  SpO2: 95% (09-11-20 @ 08:28) (93% - 95%)  Wt(kg): --Vital Signs Last 24 Hrs  T(C): 36 (11 Sep 2020 07:39), Max: 36.1 (11 Sep 2020 00:00)  T(F): 96.8 (11 Sep 2020 07:39), Max: 96.9 (11 Sep 2020 00:00)  HR: 81 (11 Sep 2020 07:39) (73 - 81)  BP: 113/64 (11 Sep 2020 07:39) (100/55 - 113/64)  BP(mean): --  RR: 18 (11 Sep 2020 08:28) (18 - 18)  SpO2: 95% (11 Sep 2020 08:28) (93% - 95%)    PHYSICAL EXAM:  HEAD:  Atraumatic, Normocephalic  EYES: EOMI, PERRLA, conjunctiva and sclera clear  ENMT: No tonsillar erythema, exudates, or enlargement; Moist mucous membranes, very poor dention dentition, No lesions  NECK: Supple, No JVD, Normal thyroid  NERVOUS SYSTEM:  Alert  PULM: bilateral air entry  CARDIAC: S1S2  GI: Soft, Nontender, + distended  EXTREMITIES:  2+ Peripheral Pulses,  LYMPH: No lymphadenopathy noted  LABS:                            10.6   5.47  )-----------( 163      ( 11 Sep 2020 06:19 )             32.2   09-11    138  |  100  |  20  ----------------------------<  113<H>  3.8   |  28  |  0.5<L>    Ca    8.0<L>      11 Sep 2020 06:19  Mg     2.1     09-10    TPro  5.5<L>  /  Alb  2.8<L>  /  TBili  <0.2  /  DBili  x   /  AST  37  /  ALT  34  /  AlkPhos  82  09-11            Culture - Blood (collected 08 Sep 2020 18:35)  Source: .Blood Blood  Gram Stain (09 Sep 2020 09:01):    Growth in aerobic bottle: Gram Positive Cocci in Pairs and Chains and    Gram Positive Cocci in Clusters    Growth in anaerobic bottle: Gram Positive Cocci in Pairs and Chains and    Gram Positive Cocci in Clusters  Preliminary Report (11 Sep 2020 10:23):    Growth in aerobic and anaerobic bottles: Staphylococcus aureus    Growth in aerobic and anaerobic bottles: Streptococcus agalactiae Group B    Susceptibility to follow.    "Due to technical problems, Proteus sp. will Not be reported as part of    the BCID panel until further notice"    ***Blood Panel PCR results on this specimen are available    approximately 3 hours after the Gram stain result.***    Gram stain, PCR, and/or culture results may not always    correspond due to difference in methodologies.    ************************************************************    This PCR assay was performed using [employer].    The following targets are tested for: Enterococcus,    vancomycin resistant enterococci, Listeria monocytogenes,    coagulase negative staphylococci, S. aureus,    methicillin resistant S. aureus, Streptococcus agalactiae    (Group B), S. pneumoniae, S. pyogenes (Group A),    Acinetobacter baumannii, Enterobacter cloacae, E. coli,    Klebsiella oxytoca, K. pneumoniae, Proteus sp.,    Serratia marcescens, Haemophilus influenzae,    Neisseria meningitidis, Pseudomonas aeruginosa, Candida    albicans, C. glabrata, C krusei, C parapsilosis,    C. tropicalis and the KPC resistance gene.  Organism: Blood Culture PCR  Staphylococcus aureus (11 Sep 2020 10:19)  Organism: Staphylococcus aureus (11 Sep 2020 10:19)  Organism: Blood Culture PCR (09 Sep 2020 09:00)    Culture - Blood (collected 08 Sep 2020 18:35)  Source: .Blood Blood-Peripheral  Gram Stain (09 Sep 2020 10:03):    Growth in anaerobic bottle: Gram Positive Cocci in Clusters and Gram    positive cocci in pairs    Growth in aerobic bottle: Gram Positive Cocci in Clusters and Gram    positive cocci in pairs  Preliminary Report (10 Sep 2020 18:54):    Growth in aerobic and anaerobic bottles: Staphylococcus aureus    Growth in aerobic and anaerobic bottles: Streptococcus agalactiae Group B    See previous culture [id number]    Culture - Urine (collected 08 Sep 2020 18:35)  Source: .Urine Catheterized  Final Report (09 Sep 2020 21:45):    >=3 organisms. Probable collection contamination.      baclofen 10 milliGRAM(s) Oral three times a day  calcium carbonate   1250 mG (OsCal) 1 Tablet(s) Oral two times a day  ceFAZolin   IVPB 2000 milliGRAM(s) IV Intermittent every 8 hours  enoxaparin Injectable 40 milliGRAM(s) SubCutaneous at bedtime  oxybutynin 5 milliGRAM(s) Oral every 12 hours  PHENobarbital 64.8 milliGRAM(s) Oral daily  polyethylene glycol 3350 Oral Powder - Peds 17 Gram(s) Oral daily  polyvinyl alcohol 1.4%/povidone 0.6% Ophthalmic Solution - Peds 1 Drop(s) Both EYES four times a day  predniSONE   Tablet 40 milliGRAM(s) Oral daily  senna 2 Tablet(s) Oral at bedtime      HEALTH ISSUES - PROBLEM Dx:          Case Discussed with House Staff     Spectra [phone]

## 2020-09-11 NOTE — PROGRESS NOTE ADULT - ASSESSMENT
64 y.o. M w/ PMHx  of cerebral palsy from group home, seizure disorder, spastic paraplegia, s/p PEG tube, BPH, bladder neck stricture s/p suprapubic cath with history of ESBL in urine, Asthma; presenting with Fever and non-productive cough associated with SOB.     #Sepsis secondary to Pneumonia   - Presented with fever, cough, SOB   - WBC 11K, Temp 103F (rectal), HR 118bpm, RR 24, lactate 2.7    - Sepsis and lactate level (1.0) resolved  - COVID PCR negative   - Blood culture: Staph Aureus & Group B strep detected   - UA: + Bacteria, +WBC/LE, Hyaline Cast, protein 300 mg/dL  - Urine culture: 3+ organism, possible collection contamination    - s/p Vancomycin & Cefepime  - ID on board: Cefazolin 2g q8h IV, f/u repeat BCX & TTE  - f/u Urine Legionella, Pneumococcal antigen   - COVID re-swab before discharge to [facility]     # Bacteremia   - Blood culture: Staph Aureus & Group B strep detected   - s/p Vancomycin & Cefepime   - ID on board: Cefazolin 2g q8h IV, repeat BCX, f/u TTE  	- Continue Cefazolin for 14 days from date of negative blood culture   - Place midline for IV abx before discharge     #Hx of Asthma  - c/w Albuterol and Nebs PRN   - Prednisone 14mg PO QD for 5 days     #Thrombocytopenia likely reactive to active infection   - Baseline around low 200's   - 112 --> 80 --> 99 --> 163 (normal, today)     #Normocytic anemia likely Anemia of Chronic Disease  - Hgb 10.5 --> 9.5 --> 10.6   - continue to monitor      #Seizure Disorder: c/w Phenobarbital     #Hx of CP: c/w Baclofen     DVT PPX: Lovenox   Full Code 64 y.o. M w/ PMHx  of cerebral palsy from group home, seizure disorder, spastic paraplegia, s/p PEG tube, BPH, bladder neck stricture s/p suprapubic cath with history of ESBL in urine, Asthma; presenting with Fever and non-productive cough associated with SOB and admitted for pneumonia.    #Sepsis secondary to Pneumonia   - Sepsis resolving  - COVID PCR negative     - s/p Vancomycin & Cefepime  - ID on board: De-escalte to cefazolin as blood culture showed MSSA bactermia  - f/u Urine Legionella, Pneumococcal antigen     # Bacteremia   - Blood culture: Staph Aureus & Group B strep detected   - Likely pneumonia is the source  - s/p Vancomycin & Cefepime   - ID on board: Continue Cefazolin for 14 days from date of negative blood culture   - Needs midline to be inserted for IV antibiotics upon discharge    #Hx of Asthma  - c/w Albuterol and Nebs PRN   -S/p IV steroids  - Prednisone 40 PO QD for 5 days     #Thrombocytopenia likely reactive to active infection  -Initially concerns of HIT , but platelets are improving  -Likely related to sepsis   - Baseline around low 200's   - 112 --> 80 --> 99 --> 163 (normal, today)     #Normocytic anemia likely Anemia of Chronic Disease  - Hgb 10.5 --> 9.5 --> 10.6   - continue to monitor      #Seizure Disorder: c/w Phenobarbital     #Hx of CP: c/w Baclofen     DVT PPX: Lovenox   Full Code  Dispo: Comes from [facility].      Addendum:  Resident note:  patient was seen and examined today.  Plan discussed with the medical student and the medical team.  Agree with the above note, history, exam and plan  Note was edited by me as needed

## 2020-09-11 NOTE — PROGRESS NOTE ADULT - ASSESSMENT
ASSESSMENT  65 y/o m with Medical Hx  of cerebral palsy from group home ([facility]) , seizure disorder, spastic paraplegia, s/p PEG tube, BPH, bladder neck stricture s/p suprapubic cath, history of ESBL, asthma, from group home presented with Fever and non- Productive cough.       IMPRESSION  #Polymicrobial bacteremia with MSSA & GBS, unclear source    9/8 BCX +    9/8 UCX >3 orgs    SPC appears clean, PEG clean, no back pain, no devices/prosthesis, recent catheters  #CXR with bilateral opacities    hx COVID 5/2020 9/8 PCR NEGATIVE   #Severe Sepsis on admission T>101F, Pulse>90, lactic acidosis,  #Lactic acidosis  #Hyponatremia       RECOMMENDATIONS  - Cefazolin 2g q8h IV  - repeat BCX  - TTE  - Will need IV abx on D/C    If any questions, please call or send a message on [employer]  Spectra [phone]

## 2020-09-11 NOTE — PROCEDURE NOTE - NSPROCDETAILS_GEN_ALL_CORE
sterile dressing applied/ultrasound guidance/supine position/location identified, draped/prepped, sterile technique used

## 2020-09-11 NOTE — PROGRESS NOTE ADULT - SUBJECTIVE AND OBJECTIVE BOX
TISH SHAIKH  64y, Male  Allergy: No Known Allergies      LOS  3d    CHIEF COMPLAINT: Sepsis secondary to Pneumonia (11 Sep 2020 09:54)      INTERVAL EVENTS/HPI  - No acute events overnight  - T(F): , Max: 96.9 (09-11-20 @ 00:00)  - Denies any worsening symptoms  - Tolerating medication  - WBC Count: 5.47 (09-11-20 @ 06:19)  WBC Count: 6.62 (09-10-20 @ 06:49)  - Creatinine, Serum: 0.5 (09-11-20 @ 06:19)  Creatinine, Serum: 0.5 (09-10-20 @ 06:49)       ROS  General: Denies rigors, nightsweats  HEENT: Denies headache, rhinorrhea, sore throat, eye pain  CV: Denies CP, palpitations  PULM: Denies wheezing, hemoptysis  GI: Denies hematemesis, hematochezia, melena  : Denies discharge, hematuria  MSK: Denies arthralgias, myalgias  SKIN: Denies rash, lesions  NEURO: Denies paresthesias, weakness  PSYCH: Denies depression, anxiety    VITALS:  T(F): 96.8, Max: 96.9 (09-11-20 @ 00:00)  HR: 81  BP: 113/64  RR: 18Vital Signs Last 24 Hrs  T(C): 36 (11 Sep 2020 07:39), Max: 36.1 (11 Sep 2020 00:00)  T(F): 96.8 (11 Sep 2020 07:39), Max: 96.9 (11 Sep 2020 00:00)  HR: 81 (11 Sep 2020 07:39) (73 - 81)  BP: 113/64 (11 Sep 2020 07:39) (100/55 - 113/64)  BP(mean): --  RR: 18 (11 Sep 2020 08:28) (18 - 18)  SpO2: 95% (11 Sep 2020 08:28) (93% - 95%)    PHYSICAL EXAM:  Gen: NAD, resting in bed  HEENT: Normocephalic, atraumatic  Neck: supple, no lymphadenopathy  CV: Regular rate & regular rhythm,. no murmur  Back no paraspinal tenderness to palpation   Lungs: decreased BS at bases, no fremitus  Abdomen: Soft, BS present, PEG clean, SPC clean  Ext: Warm, well perfused  Neuro: non focal, awake  Skin: no rash, no erythema  Lines: no phlebitis     FH: Non-contributory  Social Hx: Non-contributory    TESTS & MEASUREMENTS:                        10.6   5.47  )-----------( 163      ( 11 Sep 2020 06:19 )             32.2     09-11    138  |  100  |  20  ----------------------------<  113<H>  3.8   |  28  |  0.5<L>    Ca    8.0<L>      11 Sep 2020 06:19  Mg     2.1     09-10    TPro  5.5<L>  /  Alb  2.8<L>  /  TBili  <0.2  /  DBili  x   /  AST  37  /  ALT  34  /  AlkPhos  82  09-11    eGFR if Non African American: 115 mL/min/1.73M2 (09-11-20 @ 06:19)  eGFR if : 133 mL/min/1.73M2 (09-11-20 @ 06:19)    LIVER FUNCTIONS - ( 11 Sep 2020 06:19 )  Alb: 2.8 g/dL / Pro: 5.5 g/dL / ALK PHOS: 82 U/L / ALT: 34 U/L / AST: 37 U/L / GGT: x               Culture - Blood (collected 09-08-20 @ 18:35)  Source: .Blood Blood  Gram Stain (09-09-20 @ 09:01):    Growth in aerobic bottle: Gram Positive Cocci in Pairs and Chains and    Gram Positive Cocci in Clusters    Growth in anaerobic bottle: Gram Positive Cocci in Pairs and Chains and    Gram Positive Cocci in Clusters  Preliminary Report (09-11-20 @ 10:23):    Growth in aerobic and anaerobic bottles: Staphylococcus aureus    Growth in aerobic and anaerobic bottles: Streptococcus agalactiae Group B    Susceptibility to follow.    "Due to technical problems, Proteus sp. will Not be reported as part of    the BCID panel until further notice"    ***Blood Panel PCR results on this specimen are available    approximately 3 hours after the Gram stain result.***    Gram stain, PCR, and/or culture results may not always    correspond due to difference in methodologies.    ************************************************************    This PCR assay was performed using [employer].    The following targets are tested for: Enterococcus,    vancomycin resistant enterococci, Listeria monocytogenes,    coagulase negative staphylococci, S. aureus,    methicillin resistant S. aureus, Streptococcus agalactiae    (Group B), S. pneumoniae, S. pyogenes (Group A),    Acinetobacter baumannii, Enterobacter cloacae, E. coli,    Klebsiella oxytoca, K. pneumoniae, Proteus sp.,    Serratia marcescens, Haemophilus influenzae,    Neisseria meningitidis, Pseudomonas aeruginosa, Candida    albicans, C. glabrata, C krusei, C parapsilosis,    C. tropicalis and the KPC resistance gene.  Organism: Blood Culture PCR  Staphylococcus aureus (09-11-20 @ 10:19)  Organism: Staphylococcus aureus (09-11-20 @ 10:19)      -  Ampicillin/Sulbactam: S <=8/4      -  Cefazolin: S <=4      -  Clindamycin: S <=0.25      -  Erythromycin: R >4      -  Gentamicin: I 8 Should not be used as monotherapy      -  Oxacillin: S 1      -  Penicillin: R >8      -  RIF- Rifampin: S <=1 Should not be used as monotherapy      -  Tetra/Doxy: S <=1      -  Trimethoprim/Sulfamethoxazole: S <=0.5/9.5      -  Vancomycin: S 1      Method Type: MIGUEL A  Organism: Blood Culture PCR (09-09-20 @ 09:00)      -  Staphylococcus aureus: Detec Any isolate of Staphylococcus aureus from a blood culture is NOT considered a contaminant.      -  Streptococcus agalactiae (Group B): Detec      Method Type: PCR    Culture - Blood (collected 09-08-20 @ 18:35)  Source: .Blood Blood-Peripheral  Gram Stain (09-09-20 @ 10:03):    Growth in anaerobic bottle: Gram Positive Cocci in Clusters and Gram    positive cocci in pairs    Growth in aerobic bottle: Gram Positive Cocci in Clusters and Gram    positive cocci in pairs  Preliminary Report (09-10-20 @ 18:54):    Growth in aerobic and anaerobic bottles: Staphylococcus aureus    Growth in aerobic and anaerobic bottles: Streptococcus agalactiae Group B    See previous culture [id number]    Culture - Urine (collected 09-08-20 @ 18:35)  Source: .Urine Catheterized  Final Report (09-09-20 @ 21:45):    >=3 organisms. Probable collection contamination.        Lactate, Blood: 3.1 mmol/L (09-08-20 @ 16:00)  Blood Gas Venous - Lactate: 2.7 mmoL/L (09-08-20 @ 12:46)      INFECTIOUS DISEASES TESTING  COVID-19 PCR: NotDetec (09-08-20 @ 14:19)  COVID-19 PCR: Detected (06-07-20 @ 13:38)  COVID-19 PCR: Detected (06-02-20 @ 08:17)  COVID-19 PCR: Detected (05-30-20 @ 12:54)  COVID-19 PCR: NotDetec (05-29-20 @ 15:33)  Procalcitonin, Serum: 0.22 (05-28-20 @ 11:41)  COVID-19 PCR: Detected (05-27-20 @ 12:20)  COVID-19 PCR: NotDetec (05-24-20 @ 08:00)  COVID-19 PCR: NotDetec (05-19-20 @ 08:00)  COVID-19 PCR: NotDetec (03-24-20 @ 16:35)  Procalcitonin, Serum: 0.09 (03-24-20 @ 12:17)  Procalcitonin, Serum: 0.13 (03-23-20 @ 07:20)  Procalcitonin, Serum: 0.10 (03-21-20 @ 17:09)  Legionella Antigen, Urine: Negative (03-21-20 @ 00:30)  Streptococcus Pneumoniae Ag Urine: Negative (03-21-20 @ 00:30)  MRSA PCR Result.: Negative (03-21-20 @ 00:30)  COVID-19 PCR: NotDetec (03-20-20 @ 17:34)  Flu B Result: Negative (03-20-20 @ 17:34)  RSV Result: Negative (03-20-20 @ 17:34)  Flu A Result: Negative (03-20-20 @ 17:34)  Rapid RVP Result: Detected (03-20-20 @ 17:34)      INFLAMMATORY MARKERS  Sedimentation Rate, Erythrocyte: 55 mm/Hr (03-23-20 @ 07:20)  C-Reactive Protein, Serum: 4.69 mg/dL (03-23-20 @ 07:20)  C-Reactive Protein, Serum: 5.03 mg/dL (03-21-20 @ 17:09)  Sedimentation Rate, Erythrocyte: 62 mm/Hr (03-21-20 @ 17:09)      RADIOLOGY & ADDITIONAL TESTS:  I have personally reviewed the last available Chest xray  CXR      CT      CARDIOLOGY TESTING  12 Lead ECG:   Ventricular Rate 121 BPM    Atrial Rate 121 BPM    P-R Interval 122 ms    QRS Duration 80 ms    Q-T Interval 298 ms    QTC Calculation(Bezet) 423 ms    P Axis 49 degrees    R Axis 6 degrees    T Axis 31 degrees    Diagnosis Line Sinus tachycardia  Nonspecific T wave abnormality  Abnormal ECG    Confirmed by EBENEZER EDDY MD ([id number]) on 9/8/2020 3:15:00 PM (09-08-20 @ 14:03)      MEDICATIONS  baclofen 10 Oral three times a day  calcium carbonate   1250 mG (OsCal) 1 Oral two times a day  ceFAZolin   IVPB 2000 IV Intermittent every 8 hours  enoxaparin Injectable 40 SubCutaneous at bedtime  oxybutynin 5 Oral every 12 hours  PHENobarbital 64.8 Oral daily  polyethylene glycol 3350 Oral Powder - Peds 17 Oral daily  polyvinyl alcohol 1.4%/povidone 0.6% Ophthalmic Solution - Peds 1 Both EYES four times a day  predniSONE   Tablet 40 Oral daily  senna 2 Oral at bedtime      WEIGHT  Weight (kg): 75 (09-08-20 @ 13:10)  Creatinine, Serum: 0.5 mg/dL (09-11-20 @ 06:19)      ANTIBIOTICS:  ceFAZolin   IVPB 2000 milliGRAM(s) IV Intermittent every 8 hours      All available historical records have been reviewed

## 2020-09-12 LAB
-  CEFTRIAXONE: SIGNIFICANT CHANGE UP
-  CLINDAMYCIN: SIGNIFICANT CHANGE UP
-  LEVOFLOXACIN: SIGNIFICANT CHANGE UP
-  PENICILLIN: SIGNIFICANT CHANGE UP
-  VANCOMYCIN: SIGNIFICANT CHANGE UP
CULTURE RESULTS: SIGNIFICANT CHANGE UP
CULTURE RESULTS: SIGNIFICANT CHANGE UP
GLUCOSE BLDC GLUCOMTR-MCNC: 120 MG/DL — HIGH (ref 70–99)
GLUCOSE BLDC GLUCOMTR-MCNC: 128 MG/DL — HIGH (ref 70–99)
GLUCOSE BLDC GLUCOMTR-MCNC: 90 MG/DL — SIGNIFICANT CHANGE UP (ref 70–99)
METHOD TYPE: SIGNIFICANT CHANGE UP
METHOD TYPE: SIGNIFICANT CHANGE UP
ORGANISM # SPEC MICROSCOPIC CNT: SIGNIFICANT CHANGE UP
SPECIMEN SOURCE: SIGNIFICANT CHANGE UP
SPECIMEN SOURCE: SIGNIFICANT CHANGE UP

## 2020-09-12 PROCEDURE — 99233 SBSQ HOSP IP/OBS HIGH 50: CPT

## 2020-09-12 RX ADMIN — Medication 100 MILLIGRAM(S): at 06:00

## 2020-09-12 RX ADMIN — Medication 1 DROP(S): at 18:33

## 2020-09-12 RX ADMIN — Medication 100 MILLIGRAM(S): at 23:46

## 2020-09-12 RX ADMIN — Medication 1 DROP(S): at 06:00

## 2020-09-12 RX ADMIN — Medication 1 TABLET(S): at 06:00

## 2020-09-12 RX ADMIN — Medication 5 MILLIGRAM(S): at 06:00

## 2020-09-12 RX ADMIN — Medication 1 TABLET(S): at 18:32

## 2020-09-12 RX ADMIN — Medication 100 MILLIGRAM(S): at 13:16

## 2020-09-12 RX ADMIN — Medication 1 DROP(S): at 13:16

## 2020-09-12 RX ADMIN — Medication 10 MILLIGRAM(S): at 06:00

## 2020-09-12 RX ADMIN — Medication 40 MILLIGRAM(S): at 06:00

## 2020-09-12 RX ADMIN — Medication 64.8 MILLIGRAM(S): at 13:13

## 2020-09-12 RX ADMIN — Medication 5 MILLIGRAM(S): at 18:32

## 2020-09-12 RX ADMIN — Medication 10 MILLIGRAM(S): at 13:16

## 2020-09-12 RX ADMIN — Medication 10 MILLIGRAM(S): at 22:18

## 2020-09-12 RX ADMIN — ENOXAPARIN SODIUM 40 MILLIGRAM(S): 100 INJECTION SUBCUTANEOUS at 22:18

## 2020-09-12 RX ADMIN — SENNA PLUS 2 TABLET(S): 8.6 TABLET ORAL at 22:18

## 2020-09-12 RX ADMIN — POLYETHYLENE GLYCOL 3350 17 GRAM(S): 17 POWDER, FOR SOLUTION ORAL at 13:15

## 2020-09-12 RX ADMIN — Medication 1 DROP(S): at 23:30

## 2020-09-12 NOTE — PROGRESS NOTE ADULT - SUBJECTIVE AND OBJECTIVE BOX
TISH SHAIKH  64y, Male  Allergy: No Known Allergies      LOS  4d    CHIEF COMPLAINT: Sepsis secondary to Pneumonia (12 Sep 2020 11:49)      INTERVAL EVENTS/HPI  - No acute events overnight  - T(F): , Max: 96.9 (09-11-20 @ 16:30)  - Denies any worsening symptoms  - Tolerating medication  - WBC Count: 5.47 (09-11-20 @ 06:19)  WBC Count: 6.62 (09-10-20 @ 06:49)  - Creatinine, Serum: 0.5 (09-11-20 @ 06:19)       ROS  General: Denies rigors, nightsweats  HEENT: Denies headache, rhinorrhea, sore throat, eye pain  CV: Denies CP, palpitations  PULM: Denies wheezing, hemoptysis  GI: Denies hematemesis, hematochezia, melena  : Denies discharge, hematuria  MSK: Denies arthralgias, myalgias  SKIN: Denies rash, lesions  NEURO: Denies paresthesias, weakness  PSYCH: Denies depression, anxiety    VITALS:  T(F): 95.4, Max: 96.9 (09-11-20 @ 16:30)  HR: 62  BP: 105/58  RR: 17Vital Signs Last 24 Hrs  T(C): 35.2 (12 Sep 2020 07:30), Max: 36.1 (11 Sep 2020 16:30)  T(F): 95.4 (12 Sep 2020 07:30), Max: 96.9 (11 Sep 2020 16:30)  HR: 62 (12 Sep 2020 07:30) (62 - 63)  BP: 105/58 (12 Sep 2020 07:30) (105/58 - 107/57)  BP(mean): --  RR: 17 (12 Sep 2020 07:30) (17 - 17)  SpO2: 95% (11 Sep 2020 16:30) (95% - 95%)    PHYSICAL EXAM:  Gen: NAD, resting in bed  HEENT: Normocephalic, atraumatic  Neck: supple, no lymphadenopathy  CV: Regular rate & regular rhythm,. no murmur  Back no paraspinal tenderness to palpation   Lungs: decreased BS at bases, no fremitus  Abdomen: Soft, BS present, PEG clean, SPC clean  Ext: Warm, well perfused  Neuro: non focal, awake  Skin: no rash, no erythema  Lines: no phlebitis       FH: Non-contributory  Social Hx: Non-contributory    TESTS & MEASUREMENTS:                        10.6   5.47  )-----------( 163      ( 11 Sep 2020 06:19 )             32.2     09-11    138  |  100  |  20  ----------------------------<  113<H>  3.8   |  28  |  0.5<L>    Ca    8.0<L>      11 Sep 2020 06:19    TPro  5.5<L>  /  Alb  2.8<L>  /  TBili  <0.2  /  DBili  x   /  AST  37  /  ALT  34  /  AlkPhos  82  09-11      LIVER FUNCTIONS - ( 11 Sep 2020 06:19 )  Alb: 2.8 g/dL / Pro: 5.5 g/dL / ALK PHOS: 82 U/L / ALT: 34 U/L / AST: 37 U/L / GGT: x               Culture - Blood (collected 09-11-20 @ 06:19)  Source: .Blood None  Preliminary Report (09-12-20 @ 13:01):    No growth to date.    Culture - Blood (collected 09-10-20 @ 16:00)  Source: .Blood Blood-Peripheral  Preliminary Report (09-12-20 @ 06:16):    No growth to date.    Culture - Blood (collected 09-08-20 @ 18:35)  Source: .Blood Blood  Gram Stain (09-09-20 @ 09:01):    Growth in aerobic bottle: Gram Positive Cocci in Pairs and Chains and    Gram Positive Cocci in Clusters    Growth in anaerobic bottle: Gram Positive Cocci in Pairs and Chains and    Gram Positive Cocci in Clusters  Final Report (09-12-20 @ 08:25):    Growth in aerobic and anaerobic bottles: Staphylococcus aureus    Growth in aerobic and anaerobic bottles: Streptococcus agalactiae Group B    "Due to technical problems, Proteus sp. will Not be reported as part of    the BCID panel until further notice"    ***Blood Panel PCR results on this specimen are available    approximately 3 hours after the Gram stain result.***    Gram stain, PCR, and/or culture results may not always    correspond due to difference in methodologies.    ************************************************************    This PCR assay was performed using [employer].    The following targets are tested for: Enterococcus,    vancomycin resistant enterococci, Listeria monocytogenes,    coagulase negative staphylococci, S. aureus,    methicillin resistant S. aureus, Streptococcus agalactiae    (Group B), S. pneumoniae, S. pyogenes (Group A),    Acinetobacter baumannii, Enterobacter cloacae, E. coli,    Klebsiella oxytoca, K. pneumoniae, Proteus sp.,    Serratia marcescens, Haemophilus influenzae,    Neisseria meningitidis, Pseudomonas aeruginosa, Candida    albicans, C. glabrata, C krusei, C parapsilosis,    C. tropicalis and the KPC resistance gene.  Organism: Blood Culture PCR  Staphylococcus aureus  Sreptococcus agalactiae  Sreptococcus agalactiae (09-12-20 @ 08:25)  Organism: Sreptococcus agalactiae (09-12-20 @ 08:25)      -  Ceftriaxone: S 0.094      -  Penicillin: S 0.064 Predicts results for ampicillin, amoxicillin, amoxicillin/clavulanate, ampicillin/sulbactam, 1st, 2nd and 3rd generation cephalosporins and carbapenems.      Method Type: ETEST  Organism: Sreptococcus agalactiae (09-12-20 @ 08:25)      -  Clindamycin: S      -  Levofloxacin: S      -  Vancomycin: S      Method Type: KB  Organism: Staphylococcus aureus (09-12-20 @ 08:25)      -  Ampicillin/Sulbactam: S <=8/4      -  Cefazolin: S <=4      -  Clindamycin: S <=0.25      -  Erythromycin: R >4      -  Gentamicin: I 8 Should not be used as monotherapy      -  Oxacillin: S 1      -  Penicillin: R >8      -  RIF- Rifampin: S <=1 Should not be used as monotherapy      -  Tetra/Doxy: S <=1      -  Trimethoprim/Sulfamethoxazole: S <=0.5/9.5      -  Vancomycin: S 1      Method Type: MIGUEL A  Organism: Blood Culture PCR (09-12-20 @ 08:25)      -  Staphylococcus aureus: Detec Any isolate of Staphylococcus aureus from a blood culture is NOT considered a contaminant.      -  Streptococcus agalactiae (Group B): Detec      Method Type: PCR    Culture - Blood (collected 09-08-20 @ 18:35)  Source: .Blood Blood-Peripheral  Gram Stain (09-09-20 @ 10:03):    Growth in anaerobic bottle: Gram Positive Cocci in Clusters and Gram    positive cocci in pairs    Growth in aerobic bottle: Gram Positive Cocci in Clusters and Gram    positive cocci in pairs  Final Report (09-12-20 @ 08:26):    Growth in aerobic and anaerobic bottles: Staphylococcus aureus    Growth in aerobic and anaerobic bottles: Streptococcus agalactiae Group B    See previous culture [id number]    Culture - Urine (collected 09-08-20 @ 18:35)  Source: .Urine Catheterized  Final Report (09-09-20 @ 21:45):    >=3 organisms. Probable collection contamination.        Lactate, Blood: 3.1 mmol/L (09-08-20 @ 16:00)  Blood Gas Venous - Lactate: 2.7 mmoL/L (09-08-20 @ 12:46)      INFECTIOUS DISEASES TESTING  COVID-19 PCR: NotDetec (09-08-20 @ 14:19)  COVID-19 PCR: Detected (06-07-20 @ 13:38)  COVID-19 PCR: Detected (06-02-20 @ 08:17)  COVID-19 PCR: Detected (05-30-20 @ 12:54)  COVID-19 PCR: NotDetec (05-29-20 @ 15:33)  Procalcitonin, Serum: 0.22 (05-28-20 @ 11:41)  COVID-19 PCR: Detected (05-27-20 @ 12:20)  COVID-19 PCR: NotDetec (05-24-20 @ 08:00)  COVID-19 PCR: NotDetec (05-19-20 @ 08:00)  COVID-19 PCR: NotDetec (03-24-20 @ 16:35)  Procalcitonin, Serum: 0.09 (03-24-20 @ 12:17)  Procalcitonin, Serum: 0.13 (03-23-20 @ 07:20)  Procalcitonin, Serum: 0.10 (03-21-20 @ 17:09)  Legionella Antigen, Urine: Negative (03-21-20 @ 00:30)  Streptococcus Pneumoniae Ag Urine: Negative (03-21-20 @ 00:30)  MRSA PCR Result.: Negative (03-21-20 @ 00:30)  COVID-19 PCR: NotDetec (03-20-20 @ 17:34)  Flu B Result: Negative (03-20-20 @ 17:34)  RSV Result: Negative (03-20-20 @ 17:34)  Flu A Result: Negative (03-20-20 @ 17:34)  Rapid RVP Result: Detected (03-20-20 @ 17:34)      INFLAMMATORY MARKERS  Sedimentation Rate, Erythrocyte: 55 mm/Hr (03-23-20 @ 07:20)  C-Reactive Protein, Serum: 4.69 mg/dL (03-23-20 @ 07:20)  C-Reactive Protein, Serum: 5.03 mg/dL (03-21-20 @ 17:09)  Sedimentation Rate, Erythrocyte: 62 mm/Hr (03-21-20 @ 17:09)      RADIOLOGY & ADDITIONAL TESTS:  I have personally reviewed the last available Chest xray  CXR      CT      CARDIOLOGY TESTING  12 Lead ECG:   Ventricular Rate 121 BPM    Atrial Rate 121 BPM    P-R Interval 122 ms    QRS Duration 80 ms    Q-T Interval 298 ms    QTC Calculation(Bezet) 423 ms    P Axis 49 degrees    R Axis 6 degrees    T Axis 31 degrees    Diagnosis Line Sinus tachycardia  Nonspecific T wave abnormality  Abnormal ECG    Confirmed by EBENEZER EDDY MD ([id number]) on 9/8/2020 3:15:00 PM (09-08-20 @ 14:03)      MEDICATIONS  baclofen 10 Oral three times a day  calcium carbonate   1250 mG (OsCal) 1 Oral two times a day  ceFAZolin   IVPB 2000 IV Intermittent every 8 hours  enoxaparin Injectable 40 SubCutaneous at bedtime  oxybutynin 5 Oral every 12 hours  PHENobarbital 64.8 Oral daily  polyethylene glycol 3350 Oral Powder - Peds 17 Oral daily  polyvinyl alcohol 1.4%/povidone 0.6% Ophthalmic Solution - Peds 1 Both EYES four times a day  predniSONE   Tablet 40 Oral daily  senna 2 Oral at bedtime      WEIGHT  Weight (kg): 75 (09-08-20 @ 13:10)      ANTIBIOTICS:  ceFAZolin   IVPB 2000 milliGRAM(s) IV Intermittent every 8 hours      All available historical records have been reviewed

## 2020-09-12 NOTE — PROGRESS NOTE ADULT - SUBJECTIVE AND OBJECTIVE BOX
HAWATISH  64y  [facility] [id number] A      Patient is a 64y old  Male who presents with a chief complaint of Sepsis secondary to Pneumonia (11 Sep 2020 11:48)      INTERVAL HPI/OVERNIGHT EVENTS:        REVIEW OF SYSTEMS:  CONSTITUTIONAL: No fever, weight loss, or fatigue  EYES: No eye pain, visual disturbances, or discharge  ENMT:  No difficulty hearing, tinnitus, vertigo; No sinus or throat pain  NECK: No pain or stiffness  BREASTS: No pain, masses, or nipple discharge  RESPIRATORY: No cough, wheezing, chills or hemoptysis; No shortness of breath  CARDIOVASCULAR: No chest pain, palpitations, dizziness, or leg swelling  GASTROINTESTINAL: No abdominal or epigastric pain. No nausea, vomiting, or hematemesis; No diarrhea or constipation. No melena or hematochezia.  GENITOURINARY: No dysuria, frequency, hematuria, or incontinence  NEUROLOGICAL: No headaches, memory loss, loss of strength, numbness, or tremors  SKIN: No itching, burning, rashes, or lesions   LYMPH NODES: No enlarged glands  ENDOCRINE: No heat or cold intolerance; No hair loss  MUSCULOSKELETAL: No joint pain or swelling; No muscle, back, or extremity pain  PSYCHIATRIC: No depression, anxiety, mood swings, or difficulty sleeping  HEME/LYMPH: No easy bruising, or bleeding gums  ALLERY AND IMMUNOLOGIC: No hives or eczema  FAMILY HISTORY:  Family history unknown      T(C): 35.2 (09-12-20 @ 07:30), Max: 36.1 (09-11-20 @ 16:30)  HR: 62 (09-12-20 @ 07:30) (62 - 63)  BP: 105/58 (09-12-20 @ 07:30) (105/58 - 107/57)  RR: 17 (09-12-20 @ 07:30) (17 - 17)  SpO2: 95% (09-11-20 @ 16:30) (95% - 95%)  Wt(kg): --Vital Signs Last 24 Hrs  T(C): 35.2 (12 Sep 2020 07:30), Max: 36.1 (11 Sep 2020 16:30)  T(F): 95.4 (12 Sep 2020 07:30), Max: 96.9 (11 Sep 2020 16:30)  HR: 62 (12 Sep 2020 07:30) (62 - 63)  BP: 105/58 (12 Sep 2020 07:30) (105/58 - 107/57)  BP(mean): --  RR: 17 (12 Sep 2020 07:30) (17 - 17)  SpO2: 95% (11 Sep 2020 16:30) (95% - 95%)    PHYSICAL EXAM:  GENERAL: NAD, well-groomed, well-developed  HEAD:  Atraumatic, Normocephalic  EYES: EOMI, PERRLA, conjunctiva and sclera clear  ENMT: No tonsillar erythema, exudates, or enlargement; Moist mucous membranes Poor dentition, No lesions  NECK: Supple, No JVD, Normal thyroid  NERVOUS SYSTEM:  Alert responsive  PULM: Clear to auscultation bilaterally  CARDIAC: Regular rate and rhythm; No murmurs, rubs, or gallops  GI: Soft, Nontender, Nondistended; Bowel sounds present  EXTREMITIES:  2+ Peripheral Pulses, No clubbing, cyanosis, or edema,  LYMPH: No lymphadenopathy noted  SKIN: No rashes or lesions      LABS:                            10.6   5.47  )-----------( 163      ( 11 Sep 2020 06:19 )             32.2   09-11    138  |  100  |  20  ----------------------------<  113<H>  3.8   |  28  |  0.5<L>    Ca    8.0<L>      11 Sep 2020 06:19    TPro  5.5<L>  /  Alb  2.8<L>  /  TBili  <0.2  /  DBili  x   /  AST  37  /  ALT  34  /  AlkPhos  82  09-11            Culture - Blood (collected 10 Sep 2020 16:00)  Source: .Blood Blood-Peripheral  Preliminary Report (12 Sep 2020 06:16):    No growth to date.      baclofen 10 milliGRAM(s) Oral three times a day  calcium carbonate   1250 mG (OsCal) 1 Tablet(s) Oral two times a day  ceFAZolin   IVPB 2000 milliGRAM(s) IV Intermittent every 8 hours  enoxaparin Injectable 40 milliGRAM(s) SubCutaneous at bedtime  oxybutynin 5 milliGRAM(s) Oral every 12 hours  PHENobarbital 64.8 milliGRAM(s) Oral daily  polyethylene glycol 3350 Oral Powder - Peds 17 Gram(s) Oral daily  polyvinyl alcohol 1.4%/povidone 0.6% Ophthalmic Solution - Peds 1 Drop(s) Both EYES four times a day  predniSONE   Tablet 40 milliGRAM(s) Oral daily  senna 2 Tablet(s) Oral at bedtime      HEALTH ISSUES - PROBLEM Dx:          Case Discussed with House Staff     Spectra [phone]

## 2020-09-12 NOTE — PROGRESS NOTE ADULT - ASSESSMENT
64 y.o. M w/ PMHx  of cerebral palsy from group home, seizure disorder, spastic paraplegia, s/p PEG tube, BPH, bladder neck stricture s/p suprapubic cath with history of ESBL in urine, Asthma; presenting with Fever and non-productive cough associated with SOB and admitted for pneumonia.    #Sepsis secondary to Pneumonia   - Sepsis resolving  - COVID PCR negative     - s/p Vancomycin & Cefepime  - ID on board: De-escalte to cefazolin as blood culture showed MSSA bactermia  - f/u Urine Legionella, Pneumococcal antigen     # Bacteremia   - Blood culture: Staph Aureus & Group B strep detected   - Likely pneumonia is the source  - s/p Vancomycin & Cefepime   - ID on board: Continue Cefazolin for 14 days from date of negative blood culture   - Needs midline to be inserted for IV antibiotics upon discharge    #Hx of Asthma  - c/w Albuterol and Nebs PRN   -S/p IV steroids  -Prednisone 40 PO QD for 5 days-->DC prednisone on monday    #Thrombocytopenia likely reactive to active infection  -Initially concerns of HIT , but platelets are improving  -Likely related to sepsis   - Baseline around low 200's   - 112 --> 80 --> 99 --> 163 (normal, today)     #Normocytic anemia likely Anemia of Chronic Disease  - Hgb 10.5 --> 9.5 --> 10.6   - continue to monitor      #Seizure Disorder: c/w Phenobarbital     #Hx of CP: c/w Baclofen     DVT PPX: Lovenox   Full Code  Dispo: Comes from group Home-->Pending disposition as group home don't do IV antibiotics

## 2020-09-12 NOTE — PROGRESS NOTE ADULT - ASSESSMENT
ASSESSMENT  65 y/o m with Medical Hx  of cerebral palsy from group home ([facility]) , seizure disorder, spastic paraplegia, s/p PEG tube, BPH, bladder neck stricture s/p suprapubic cath, history of ESBL, asthma, from group home presented with Fever and non- Productive cough.       IMPRESSION  #Polymicrobial bacteremia with MSSA & GBS, unclear source ?PNA    TTE no vegetations     9/8 BCX +, 9/10-11 BCX NGTD     9/8 UCX >3 orgs    SPC appears clean, PEG clean, no back pain, no devices/prosthesis, recent catheters  #CXR with bilateral opacities    hx COVID 5/2020 9/8 PCR NEGATIVE   #Severe Sepsis on admission T>101F, Pulse>90, lactic acidosis,  #Lactic acidosis  #Hyponatremia       RECOMMENDATIONS  - Cefazolin 2g q8h IV  - Will need IV abx on D/C via midline x 14 days from cleared BCX end 9/23, followed by 2 weeks PO bactrim 1 DS BID end 10/6    If any questions, please call or send a message on [employer]  Spectra [phone]

## 2020-09-12 NOTE — PROGRESS NOTE ADULT - SUBJECTIVE AND OBJECTIVE BOX
24H events:  Patient is a 64y old Male who presents with a chief complaint of SOB and Cough(09 Sep 2020 13:05)  Primary diagnosis of Sepsis secondary to Pneumonia  Today is hospital day 4d. This morning patient was seen and examined at bedside.  Patient feels better, SOB improved, still having Cough.  PAST MEDICAL & SURGICAL HISTORY  Asthma    Urinary retention    Urinary calculi    Spastic quadriplegia    Osteoporosis    Seizure  last seizure &gt;10 years ago    Cerebral palsy    BPH (benign prostatic hyperplasia)    Suprapubic catheter    H/O cystoscopy    S/P percutaneous endoscopic gastrostomy (PEG) tube placement      SOCIAL HISTORY:  Social History:  Antonella x 3 (08 Sep 2020 14:39)      ALLERGIES:  No Known Allergies    MEDICATIONS:  STANDING MEDICATIONS  baclofen 10 milliGRAM(s) Oral three times a day  calcium carbonate   1250 mG (OsCal) 1 Tablet(s) Oral two times a day  ceFAZolin   IVPB 2000 milliGRAM(s) IV Intermittent every 8 hours  enoxaparin Injectable 40 milliGRAM(s) SubCutaneous at bedtime  oxybutynin 5 milliGRAM(s) Oral every 12 hours  PHENobarbital 64.8 milliGRAM(s) Oral daily  polyethylene glycol 3350 Oral Powder - Peds 17 Gram(s) Oral daily  polyvinyl alcohol 1.4%/povidone 0.6% Ophthalmic Solution - Peds 1 Drop(s) Both EYES four times a day  predniSONE   Tablet 40 milliGRAM(s) Oral daily  senna 2 Tablet(s) Oral at bedtime    PRN MEDICATIONS    VITALS:   T(F): 95.4  HR: 62  BP: 105/58  RR: 17  SpO2: 95%    PHYSICAL EXAM:  Not in distress today. on room air  EYES: EOMI, PERRLA, conjunctiva and sclera clear  ENMT: No tonsillar erythema, exudates, or enlargement; Moist mucous membranes, very poor dentition, No lesions  NECK: Supple, No JVD, Normal thyroid  NERVOUS SYSTEM:  conscious, cooperative, alert  PULM: Decrease air entry over the LLQ , No wheezes  CARDIAC: S1S2  GI: soft abdomen ,bowel sound sluggish, with Peg tube inserted, and supra pubic catheter.  EXTREMITIES:  2+ Peripheral Pulses, extremities in contracture   LYMPH: No lymphadenopathy noted  LABS:                        10.6   5.47  )-----------( 163      ( 11 Sep 2020 06:19 )             32.2     09-11    138  |  100  |  20  ----------------------------<  113<H>  3.8   |  28  |  0.5<L>    Ca    8.0<L>      11 Sep 2020 06:19    TPro  5.5<L>  /  Alb  2.8<L>  /  TBili  <0.2  /  DBili  x   /  AST  37  /  ALT  34  /  AlkPhos  82  09-11              Culture - Blood (collected 10 Sep 2020 16:00)  Source: .Blood Blood-Peripheral  Preliminary Report (12 Sep 2020 06:16):    No growth to date.    RADIOLOGY:

## 2020-09-12 NOTE — PROGRESS NOTE ADULT - ASSESSMENT
HPI:  Patient  is a  65 y/o m with Medical Hx  of cerebral palsy from group home ([facility]) , seizure disorder, spastic paraplegia, s/p PEG tube, BPH, bladder neck stricture s/p suprapubic cath, history of ESBL, asthma, from group home presented with Fever and non- Productive cough.   Per Group home staff, Symptoms started last night with low grade fever of 100.5F he was given Tylenol with improvement. However, this morning it was reported that the patient kept coughing all night so the staff called the PMD who requested the patient be sent to ED given prior hx of testing positive for COVID.    In ED pt was septic with Leukocytosis of 11K, BP low 80's,  Tmax: 103F (rectal), tachycardic 118bpm and Tachypneic 24, lactate 2.7 on VBG  -ABG with Respiratory Alkalosis likley from hyperventilation   -S/P 2L LR bolus, duonebs, cefepime and levofloxacin (08 Sep 2020 14:39)    #Severe sepsis poa secondary to bacteremia / uti , with history of stent replacement (5/26)/ nephrolithiasis   sepsis resolved  midline placed  latest blood culture negative  will need abx upon dc     #COVID hx - resolved, latest swab negative    #Intellectual disability secondary to cerebral palsy    # Functional quadriplegia - full care    # Asthma - stable ,change to po prednisone     Progress Note Handoff    Pending:  final blood culture , given need for iv abx -> snf     Family discussion: left at 11:51 AM message with Leslie [phone]    Disposition: [facility]

## 2020-09-13 LAB
ALBUMIN SERPL ELPH-MCNC: 2.9 G/DL — LOW (ref 3.5–5.2)
ALP SERPL-CCNC: 81 U/L — SIGNIFICANT CHANGE UP (ref 30–115)
ALT FLD-CCNC: 52 U/L — HIGH (ref 0–41)
ANION GAP SERPL CALC-SCNC: 11 MMOL/L — SIGNIFICANT CHANGE UP (ref 7–14)
AST SERPL-CCNC: 52 U/L — HIGH (ref 0–41)
BILIRUB SERPL-MCNC: <0.2 MG/DL — SIGNIFICANT CHANGE UP (ref 0.2–1.2)
BUN SERPL-MCNC: 21 MG/DL — HIGH (ref 10–20)
CALCIUM SERPL-MCNC: 8.2 MG/DL — LOW (ref 8.5–10.1)
CHLORIDE SERPL-SCNC: 100 MMOL/L — SIGNIFICANT CHANGE UP (ref 98–110)
CO2 SERPL-SCNC: 26 MMOL/L — SIGNIFICANT CHANGE UP (ref 17–32)
CREAT SERPL-MCNC: 0.6 MG/DL — LOW (ref 0.7–1.5)
GLUCOSE BLDC GLUCOMTR-MCNC: 112 MG/DL — HIGH (ref 70–99)
GLUCOSE BLDC GLUCOMTR-MCNC: 113 MG/DL — HIGH (ref 70–99)
GLUCOSE BLDC GLUCOMTR-MCNC: 138 MG/DL — HIGH (ref 70–99)
GLUCOSE BLDC GLUCOMTR-MCNC: 180 MG/DL — HIGH (ref 70–99)
GLUCOSE SERPL-MCNC: 130 MG/DL — HIGH (ref 70–99)
HCT VFR BLD CALC: 35.4 % — LOW (ref 42–52)
HGB BLD-MCNC: 11.9 G/DL — LOW (ref 14–18)
MCHC RBC-ENTMCNC: 31 PG — SIGNIFICANT CHANGE UP (ref 27–31)
MCHC RBC-ENTMCNC: 33.6 G/DL — SIGNIFICANT CHANGE UP (ref 32–37)
MCV RBC AUTO: 92.2 FL — SIGNIFICANT CHANGE UP (ref 80–94)
NRBC # BLD: 0 /100 WBCS — SIGNIFICANT CHANGE UP (ref 0–0)
PLATELET # BLD AUTO: 233 K/UL — SIGNIFICANT CHANGE UP (ref 130–400)
POTASSIUM SERPL-MCNC: 4.2 MMOL/L — SIGNIFICANT CHANGE UP (ref 3.5–5)
POTASSIUM SERPL-SCNC: 4.2 MMOL/L — SIGNIFICANT CHANGE UP (ref 3.5–5)
PROT SERPL-MCNC: 5.5 G/DL — LOW (ref 6–8)
RBC # BLD: 3.84 M/UL — LOW (ref 4.7–6.1)
RBC # FLD: 13.2 % — SIGNIFICANT CHANGE UP (ref 11.5–14.5)
SODIUM SERPL-SCNC: 137 MMOL/L — SIGNIFICANT CHANGE UP (ref 135–146)
WBC # BLD: 6.26 K/UL — SIGNIFICANT CHANGE UP (ref 4.8–10.8)
WBC # FLD AUTO: 6.26 K/UL — SIGNIFICANT CHANGE UP (ref 4.8–10.8)

## 2020-09-13 PROCEDURE — 99233 SBSQ HOSP IP/OBS HIGH 50: CPT

## 2020-09-13 RX ADMIN — ENOXAPARIN SODIUM 40 MILLIGRAM(S): 100 INJECTION SUBCUTANEOUS at 21:39

## 2020-09-13 RX ADMIN — Medication 1 DROP(S): at 05:39

## 2020-09-13 RX ADMIN — SENNA PLUS 2 TABLET(S): 8.6 TABLET ORAL at 21:39

## 2020-09-13 RX ADMIN — Medication 100 MILLIGRAM(S): at 21:39

## 2020-09-13 RX ADMIN — Medication 100 MILLIGRAM(S): at 14:22

## 2020-09-13 RX ADMIN — Medication 1 TABLET(S): at 18:23

## 2020-09-13 RX ADMIN — POLYETHYLENE GLYCOL 3350 17 GRAM(S): 17 POWDER, FOR SOLUTION ORAL at 12:13

## 2020-09-13 RX ADMIN — Medication 1 TABLET(S): at 05:38

## 2020-09-13 RX ADMIN — Medication 1 DROP(S): at 18:23

## 2020-09-13 RX ADMIN — Medication 10 MILLIGRAM(S): at 13:55

## 2020-09-13 RX ADMIN — Medication 10 MILLIGRAM(S): at 05:38

## 2020-09-13 RX ADMIN — Medication 5 MILLIGRAM(S): at 05:38

## 2020-09-13 RX ADMIN — Medication 100 MILLIGRAM(S): at 05:38

## 2020-09-13 RX ADMIN — Medication 64.8 MILLIGRAM(S): at 12:13

## 2020-09-13 RX ADMIN — Medication 1 DROP(S): at 12:14

## 2020-09-13 RX ADMIN — Medication 40 MILLIGRAM(S): at 05:38

## 2020-09-13 RX ADMIN — Medication 10 MILLIGRAM(S): at 21:39

## 2020-09-13 RX ADMIN — Medication 1 DROP(S): at 23:27

## 2020-09-13 RX ADMIN — Medication 5 MILLIGRAM(S): at 18:23

## 2020-09-13 NOTE — PROGRESS NOTE ADULT - ASSESSMENT
HPI:  Patient  is a  65 y/o m with Medical Hx  of cerebral palsy from group home ([facility]) , seizure disorder, spastic paraplegia, s/p PEG tube, BPH, bladder neck stricture s/p suprapubic cath, history of ESBL, asthma, from group home presented with Fever and non- Productive cough.   Per Group home staff, Symptoms started last night with low grade fever of 100.5F he was given Tylenol with improvement. However, this morning it was reported that the patient kept coughing all night so the staff called the PMD who requested the patient be sent to ED given prior hx of testing positive for COVID.    In ED pt was septic with Leukocytosis of 11K, BP low 80's,  Tmax: 103F (rectal), tachycardic 118bpm and Tachypneic 24, lactate 2.7 on VBG  -ABG with Respiratory Alkalosis likley from hyperventilation   -S/P 2L LR bolus, duonebs, cefepime and levofloxacin (08 Sep 2020 14:39)    #Severe sepsis poa secondary to bacteremia / uti , with history of stent replacement (5/26)/ nephrolithiasis   sepsis resolved  midline placed  cefazolin via midline until 9/23, followed by 2 weeks PO bactrim 1 DS BID end 10/6    #COVID hx - resolved, latest swab negative    #Intellectual disability secondary to cerebral palsy    # Functional quadriplegia - full care    # Asthma - stable ,change to po prednisone     Progress Note Handoff    Pending awaiting placement     Family discussion: discussed at  1:26 PM  with Leslie [phone] in regards to abx duration     Disposition: SNF HPI:  Patient  is a  65 y/o m with Medical Hx  of cerebral palsy from group home ([facility]) , seizure disorder, spastic paraplegia, s/p PEG tube, BPH, bladder neck stricture s/p suprapubic cath, history of ESBL, asthma, from group home presented with Fever and non- Productive cough.   Per Group home staff, Symptoms started last night with low grade fever of 100.5F he was given Tylenol with improvement. However, this morning it was reported that the patient kept coughing all night so the staff called the PMD who requested the patient be sent to ED given prior hx of testing positive for COVID.    In ED pt was septic with Leukocytosis of 11K, BP low 80's,  Tmax: 103F (rectal), tachycardic 118bpm and Tachypneic 24, lactate 2.7 on VBG  -ABG with Respiratory Alkalosis likley from hyperventilation   -S/P 2L LR bolus, duonebs, cefepime and levofloxacin (08 Sep 2020 14:39)    #Severe sepsis poa secondary to bacteremia / uti , with history of stent replacement (5/26)/ nephrolithiasis   sepsis resolved  midline placed  cefazolin via midline until 9/23, followed by 2 weeks PO bactrim 1 DS BID end 10/6    #COVID hx - resolved, latest swab negative    #Intellectual disability secondary to cerebral palsy    # Functional quadriplegia - full care    # Asthma -will dc prednisone , stable     Progress Note Handoff    Pending awaiting placement     Family discussion: discussed at  1:26 PM  with Leslie [phone] in regards to abx duration     Disposition: SNF

## 2020-09-13 NOTE — PROGRESS NOTE ADULT - SUBJECTIVE AND OBJECTIVE BOX
TISH SHAIKH  64y  [facility] [id number] 028 A      Patient is a 64y old  Male who presents with a chief complaint of Sepsis secondary to Pneumonia (12 Sep 2020 14:26)      INTERVAL HPI/OVERNIGHT EVENTS:  no acute events overnight       REVIEW OF SYSTEMS:  ROS neg   FAMILY HISTORY:  Family history unknown      T(C): 36.3 (09-13-20 @ 07:45), Max: 36.3 (09-13-20 @ 07:45)  HR: 53 (09-13-20 @ 07:45) (53 - 86)  BP: 98/55 (09-13-20 @ 07:45) (98/55 - 113/56)  RR: 18 (09-13-20 @ 07:45) (18 - 18)  SpO2: --  Wt(kg): --Vital Signs Last 24 Hrs  T(C): 36.3 (13 Sep 2020 07:45), Max: 36.3 (13 Sep 2020 07:45)  T(F): 97.3 (13 Sep 2020 07:45), Max: 97.3 (13 Sep 2020 07:45)  HR: 53 (13 Sep 2020 07:45) (53 - 86)  BP: 98/55 (13 Sep 2020 07:45) (98/55 - 113/56)  BP(mean): --  RR: 18 (13 Sep 2020 07:45) (18 - 18)  SpO2: --    PHYSICAL EXAM:  GENERAL: NAD,   HEAD:  Atraumatic, Normocephalic  EYES: EOMI, PERRLA, conjunctiva and sclera clear  ENMT: No tonsillar erythema, exudates, or enlargement; Moist mucous membranes, very poor dentition, No lesions  NECK: Supple, No JVD, Normal thyroid  NERVOUS SYSTEM:  Alert   PULM: Clear to auscultation bilaterally  CARDIAC: Regular rate and rhythm; No murmurs, rubs, or gallops  GI: Soft, Nontender, Nondistended; Bowel sounds present  EXTREMITIES:  2+ Peripheral Pulses, No clubbing, cyanosis, or edema  LYMPH: No lymphadenopathy noted  SKIN: No rashes or lesions    Consultant(s) Notes Reviewed:  [x ] YES  [ ] NO  Care Discussed with Consultants/Other Providers [ x] YES  [ ] NO    LABS:                            11.9   6.26  )-----------( 233      ( 13 Sep 2020 06:40 )             35.4   09-13    137  |  100  |  21<H>  ----------------------------<  130<H>  4.2   |  26  |  0.6<L>    Ca    8.2<L>      13 Sep 2020 06:40    TPro  5.5<L>  /  Alb  2.9<L>  /  TBili  <0.2  /  DBili  x   /  AST  52<H>  /  ALT  52<H>  /  AlkPhos  81  09-13            Culture - Blood (collected 11 Sep 2020 06:19)  Source: .Blood None  Preliminary Report (12 Sep 2020 13:01):    No growth to date.    Culture - Blood (collected 10 Sep 2020 16:00)  Source: .Blood Blood-Peripheral  Preliminary Report (12 Sep 2020 06:16):    No growth to date.      baclofen 10 milliGRAM(s) Oral three times a day  calcium carbonate   1250 mG (OsCal) 1 Tablet(s) Oral two times a day  ceFAZolin   IVPB 2000 milliGRAM(s) IV Intermittent every 8 hours  enoxaparin Injectable 40 milliGRAM(s) SubCutaneous at bedtime  oxybutynin 5 milliGRAM(s) Oral every 12 hours  PHENobarbital 64.8 milliGRAM(s) Oral daily  polyethylene glycol 3350 Oral Powder - Peds 17 Gram(s) Oral daily  polyvinyl alcohol 1.4%/povidone 0.6% Ophthalmic Solution - Peds 1 Drop(s) Both EYES four times a day  predniSONE   Tablet 40 milliGRAM(s) Oral daily  senna 2 Tablet(s) Oral at bedtime      HEALTH ISSUES - PROBLEM Dx:          Case Discussed with House Staff     Spectra [phone]

## 2020-09-14 ENCOUNTER — TRANSCRIPTION ENCOUNTER (OUTPATIENT)
Age: 65
End: 2020-09-14

## 2020-09-14 LAB
GLUCOSE BLDC GLUCOMTR-MCNC: 112 MG/DL — HIGH (ref 70–99)
GLUCOSE BLDC GLUCOMTR-MCNC: 132 MG/DL — HIGH (ref 70–99)
GLUCOSE BLDC GLUCOMTR-MCNC: 135 MG/DL — HIGH (ref 70–99)
SARS-COV-2 RNA SPEC QL NAA+PROBE: SIGNIFICANT CHANGE UP

## 2020-09-14 PROCEDURE — 99233 SBSQ HOSP IP/OBS HIGH 50: CPT

## 2020-09-14 RX ORDER — CEFAZOLIN SODIUM 1 G
2 VIAL (EA) INJECTION
Qty: 0 | Refills: 0 | DISCHARGE
Start: 2020-09-14 | End: 2020-09-23

## 2020-09-14 RX ADMIN — Medication 1 TABLET(S): at 18:10

## 2020-09-14 RX ADMIN — Medication 5 MILLIGRAM(S): at 06:21

## 2020-09-14 RX ADMIN — Medication 1 DROP(S): at 06:22

## 2020-09-14 RX ADMIN — Medication 5 MILLIGRAM(S): at 18:11

## 2020-09-14 RX ADMIN — Medication 1 TABLET(S): at 06:21

## 2020-09-14 RX ADMIN — SENNA PLUS 2 TABLET(S): 8.6 TABLET ORAL at 21:40

## 2020-09-14 RX ADMIN — POLYETHYLENE GLYCOL 3350 17 GRAM(S): 17 POWDER, FOR SOLUTION ORAL at 11:27

## 2020-09-14 RX ADMIN — Medication 10 MILLIGRAM(S): at 13:12

## 2020-09-14 RX ADMIN — ENOXAPARIN SODIUM 40 MILLIGRAM(S): 100 INJECTION SUBCUTANEOUS at 21:40

## 2020-09-14 RX ADMIN — Medication 10 MILLIGRAM(S): at 06:21

## 2020-09-14 RX ADMIN — Medication 10 MILLIGRAM(S): at 21:40

## 2020-09-14 RX ADMIN — Medication 100 MILLIGRAM(S): at 06:21

## 2020-09-14 RX ADMIN — Medication 100 MILLIGRAM(S): at 13:13

## 2020-09-14 RX ADMIN — Medication 1 DROP(S): at 23:58

## 2020-09-14 RX ADMIN — Medication 100 MILLIGRAM(S): at 21:39

## 2020-09-14 RX ADMIN — Medication 64.8 MILLIGRAM(S): at 13:12

## 2020-09-14 RX ADMIN — Medication 1 DROP(S): at 11:25

## 2020-09-14 RX ADMIN — Medication 1 DROP(S): at 18:11

## 2020-09-14 NOTE — PROGRESS NOTE ADULT - ASSESSMENT
HPI:  Patient  is a  65 y/o m with Medical Hx  of cerebral palsy from group home ([facility]) , seizure disorder, spastic paraplegia, s/p PEG tube, BPH, bladder neck stricture s/p suprapubic cath, history of ESBL, asthma, from group home presented with Fever and non- Productive cough.   Per Group home staff, Symptoms started last night with low grade fever of 100.5F he was given Tylenol with improvement. However, this morning it was reported that the patient kept coughing all night so the staff called the PMD who requested the patient be sent to ED given prior hx of testing positive for COVID.    In ED pt was septic with Leukocytosis of 11K, BP low 80's,  Tmax: 103F (rectal), tachycardic 118bpm and Tachypneic 24, lactate 2.7 on VBG  -ABG with Respiratory Alkalosis likley from hyperventilation   -S/P 2L LR bolus, duonebs, cefepime and levofloxacin (08 Sep 2020 14:39)    #Severe sepsis poa secondary to bacteremia / uti , with history of stent replacement (5/26)/ nephrolithiasis   midline placed  cefazolin via midline until 9/23, followed by 2 weeks PO bactrim 1 DS BID end 10/6    #COVID hx - resolved, latest swab negative, repeat swab sent for SNF placement     #Intellectual disability secondary to cerebral palsy    # Functional quadriplegia - full care    # Asthma  , stable     Progress Note Handoff    Pending awaiting placement     Family discussion: Leslie aware of plan of care    Disposition: SNF

## 2020-09-14 NOTE — PROGRESS NOTE ADULT - SUBJECTIVE AND OBJECTIVE BOX
TISH SHAIKH  64y  [facility] [id number] 028 A      Patient is a 64y old  Male who presents with a chief complaint of Sepsis secondary to Pneumonia (14 Sep 2020 15:19)      INTERVAL HPI/OVERNIGHT EVENTS:  no acute events overnight       REVIEW OF SYSTEMS:  ROS neg   FAMILY HISTORY:  Family history unknown      T(C): 36.2 (09-14-20 @ 07:50), Max: 36.2 (09-13-20 @ 16:00)  HR: 66 (09-14-20 @ 07:50) (62 - 68)  BP: 103/56 (09-14-20 @ 07:50) (101/55 - 107/57)  RR: 18 (09-14-20 @ 07:50) (17 - 18)  SpO2: --  Wt(kg): --Vital Signs Last 24 Hrs  T(C): 36.2 (14 Sep 2020 07:50), Max: 36.2 (13 Sep 2020 16:00)  T(F): 97.2 (14 Sep 2020 07:50), Max: 97.2 (13 Sep 2020 16:00)  HR: 66 (14 Sep 2020 07:50) (62 - 68)  BP: 103/56 (14 Sep 2020 07:50) (101/55 - 107/57)  BP(mean): --  RR: 18 (14 Sep 2020 07:50) (17 - 18)  SpO2: --    PHYSICAL EXAM:  GEN Lying in no acute distress  HEENT Pupils equal and reactive to light and accommodationSupple Neck  PULM Clear to auscultation bilaterally  CV s1s2   GI + bowel sounds nontnender  EXT no cyanosis or edema  INTEG No Lesions  NEURO Moves all extremities      LABS:                            11.9   6.26  )-----------( 233      ( 13 Sep 2020 06:40 )             35.4   09-13    137  |  100  |  21<H>  ----------------------------<  130<H>  4.2   |  26  |  0.6<L>    Ca    8.2<L>      13 Sep 2020 06:40    TPro  5.5<L>  /  Alb  2.9<L>  /  TBili  <0.2  /  DBili  x   /  AST  52<H>  /  ALT  52<H>  /  AlkPhos  81  09-13            baclofen 10 milliGRAM(s) Oral three times a day  calcium carbonate   1250 mG (OsCal) 1 Tablet(s) Oral two times a day  ceFAZolin   IVPB 2000 milliGRAM(s) IV Intermittent every 8 hours  enoxaparin Injectable 40 milliGRAM(s) SubCutaneous at bedtime  oxybutynin 5 milliGRAM(s) Oral every 12 hours  PHENobarbital 64.8 milliGRAM(s) Oral daily  polyethylene glycol 3350 Oral Powder - Peds 17 Gram(s) Oral daily  polyvinyl alcohol 1.4%/povidone 0.6% Ophthalmic Solution - Peds 1 Drop(s) Both EYES four times a day  senna 2 Tablet(s) Oral at bedtime      HEALTH ISSUES - PROBLEM Dx:          Case Discussed with House Staff     Spectra [phone]

## 2020-09-14 NOTE — PROGRESS NOTE ADULT - SUBJECTIVE AND OBJECTIVE BOX
DAILY PROGRESS NOTE  ===========================================================    Patient Information:  TISH SHAIKH  /  64y  /  Male  /  MRN#: [id number]    Hospital Day: 6d     |:::::::::::::::::::::::::::| SUBJECTIVE |:::::::::::::::::::::::::::|  64 y.o. M w/ PMHx  of cerebral palsy from group home ([facility]), seizure disorder, spastic paraplegia, s/p PEG tube, BPH, bladder neck stricture s/p suprapubic cath, history of ESBL, asthma; presented with fever and non-productive cough.   Per Group home staff, symptoms started at night with low grade fever of 100.5F, given Tylenol with improvement; however, coughing continued all night.   ED: septic with Leukocytosis of 11K, BP low 80's, Tmax: 103F (rectal), tachycardic 118bpm and Tachypneic 24, 94% O2 sat   - Px: bilateral rhonchi and expiratory wheezing   - Lactate 2.7 on VBG  - ABG with Respiratory Alkalosis likely from hyperventilation   - Given 2L LR bolus, Duoneb, Cefepime and Levofloxacin       OVERNIGHT EVENTS: No acute overnight events   TODAY: Patient was seen today at bedside. No SOB, chest pain, abd pain.     |:::::::::::::::::::::::::::| OBJECTIVE |:::::::::::::::::::::::::::|    VITAL SIGNS: Last 24 Hours  T(C): 36.2 (14 Sep 2020 07:50), Max: 36.2 (13 Sep 2020 16:00)  T(F): 97.2 (14 Sep 2020 07:50), Max: 97.2 (13 Sep 2020 16:00)  HR: 66 (14 Sep 2020 07:50) (62 - 68)  BP: 103/56 (14 Sep 2020 07:50) (101/55 - 107/57)  BP(mean): --  RR: 18 (14 Sep 2020 07:50) (17 - 18)  SpO2: --    09-13-20 @ 07:01  -  09-14-20 @ 07:00  --------------------------------------------------------  IN: 1560 mL / OUT: 1350 mL / NET: 210 mL      PHYSICAL EXAM:  GENERAL:   Awake, alert; NAD.  HEENT: White patches on tongue. Sclera anicteric.   CARDIO:   Regular rate; Regular rhythm; S1 & S2.  RESP:   No rales, wheezing, or rhonchi appreciated.  GI:  PEG tube. Active BS; No guarding; No rebound tenderness.  EXT: UE in contracted position.       LAB RESULTS:                        11.9   6.26  )-----------( 233      ( 13 Sep 2020 06:40 )             35.4     09-13    137  |  100  |  21<H>  ----------------------------<  130<H>  4.2   |  26  |  0.6<L>    Ca    8.2<L>      13 Sep 2020 06:40    TPro  5.5<L>  /  Alb  2.9<L>  /  TBili  <0.2  /  DBili  x   /  AST  52<H>  /  ALT  52<H>  /  AlkPhos  81  09-13                MICROBIOLOGY:    RADIOLOGY:  < from: Xray Chest 1 View-PORTABLE IMMEDIATE (09.09.20 @ 09:54) >  Lung parenchyma/Pleura: Stable bilateral opacities. Elevated left hemidiaphragm.    < end of copied text >        < from: Xray Kidney Ureter Bladder (09.09.20 @ 09:54) >  Nonspecific bowel gas pattern, noting generalized gaseous distention of colon. Similar bowel gas pattern had been seen on remote abdominal radiograph of 5/21/2020.    Gastrostomy tube overlying the left upper quadrant.      Osteopenia. Chronic hipdeformities. Degenerative changes of the spine.    < end of copied text >        < from: TTE Echo Complete w/o Contrast w/ Doppler (09.11.20 @ 14:25) >  Summary:   1. LV Ejection Fraction by Byrd's Method with a biplane EF of 66 %.   2. Normal left ventricular size and wall thicknesses, with normal systolic and diastolic function.   3. Normal left atrial size.   4. Normal right atrial size.   5. No evidence of mitral valve regurgitation.   6.LA volume Index is 14.6 ml/m² ml/m2.    PHYSICIAN INTERPRETATION:  Left Ventricle: Normal left ventricular size and wall thicknesses, with normal systolic and diastolic function. Spectral Doppler shows normal pattern of LV diastolic filling. Normal LV filling pressures.    < end of copied text >        ALLERGIES:  No Known Allergies      ===========================================================     DAILY PROGRESS NOTE  ===========================================================    Patient Information:  TISH SHAIKH  /  64y  /  Male  /  MRN#: [id number]    Hospital Day: 6d     |:::::::::::::::::::::::::::| SUBJECTIVE |:::::::::::::::::::::::::::|  64 y.o. M w/ PMHx  of cerebral palsy from group home ([facility]), seizure disorder, spastic paraplegia, s/p PEG tube, BPH, bladder neck stricture s/p suprapubic cath, history of ESBL, asthma; presented with fever and non-productive cough.   Per Group home staff, symptoms started at night with low grade fever of 100.5F, given Tylenol with improvement; however, coughing continued all night.   ED: septic with Leukocytosis of 11K, BP low 80's, Tmax: 103F (rectal), tachycardic 118bpm and Tachypneic 24, 94% O2 sat   - Px: bilateral rhonchi and expiratory wheezing   - Lactate 2.7 on VBG  - ABG with Respiratory Alkalosis likely from hyperventilation   - Given 2L LR bolus, Duoneb, Cefepime and Levofloxacin       OVERNIGHT EVENTS: No acute overnight events   TODAY: Patient was seen today at bedside. No SOB, chest pain, abd pain.     |:::::::::::::::::::::::::::| OBJECTIVE |:::::::::::::::::::::::::::|    VITAL SIGNS: Last 24 Hours  T(C): 36.2 (14 Sep 2020 07:50), Max: 36.2 (13 Sep 2020 16:00)  T(F): 97.2 (14 Sep 2020 07:50), Max: 97.2 (13 Sep 2020 16:00)  HR: 66 (14 Sep 2020 07:50) (62 - 68)  BP: 103/56 (14 Sep 2020 07:50) (101/55 - 107/57)  BP(mean): --  RR: 18 (14 Sep 2020 07:50) (17 - 18)  SpO2: --    09-13-20 @ 07:01  -  09-14-20 @ 07:00  --------------------------------------------------------  IN: 1560 mL / OUT: 1350 mL / NET: 210 mL      PHYSICAL EXAM:  GENERAL:   Awake, alert; NAD.  HEENT: White patches on tongue. Sclera anicteric.   CARDIO:   Regular rate; Regular rhythm; S1 & S2.  RESP:   No rales, wheezing, or rhonchi appreciated.  GI:  PEG tube. Active BS; No guarding; No rebound tenderness., suprapubic catheter  EXT: UE in contracted position.       LAB RESULTS:                        11.9   6.26  )-----------( 233      ( 13 Sep 2020 06:40 )             35.4     09-13    137  |  100  |  21<H>  ----------------------------<  130<H>  4.2   |  26  |  0.6<L>    Ca    8.2<L>      13 Sep 2020 06:40    TPro  5.5<L>  /  Alb  2.9<L>  /  TBili  <0.2  /  DBili  x   /  AST  52<H>  /  ALT  52<H>  /  AlkPhos  81  09-13                MICROBIOLOGY:    RADIOLOGY:  < from: Xray Chest 1 View-PORTABLE IMMEDIATE (09.09.20 @ 09:54) >  Lung parenchyma/Pleura: Stable bilateral opacities. Elevated left hemidiaphragm.    < end of copied text >        < from: Xray Kidney Ureter Bladder (09.09.20 @ 09:54) >  Nonspecific bowel gas pattern, noting generalized gaseous distention of colon. Similar bowel gas pattern had been seen on remote abdominal radiograph of 5/21/2020.    Gastrostomy tube overlying the left upper quadrant.      Osteopenia. Chronic hipdeformities. Degenerative changes of the spine.    < end of copied text >        < from: TTE Echo Complete w/o Contrast w/ Doppler (09.11.20 @ 14:25) >  Summary:   1. LV Ejection Fraction by Byrd's Method with a biplane EF of 66 %.   2. Normal left ventricular size and wall thicknesses, with normal systolic and diastolic function.   3. Normal left atrial size.   4. Normal right atrial size.   5. No evidence of mitral valve regurgitation.   6.LA volume Index is 14.6 ml/m² ml/m2.    PHYSICIAN INTERPRETATION:  Left Ventricle: Normal left ventricular size and wall thicknesses, with normal systolic and diastolic function. Spectral Doppler shows normal pattern of LV diastolic filling. Normal LV filling pressures.    < end of copied text >        ALLERGIES:  No Known Allergies      ===========================================================

## 2020-09-14 NOTE — DISCHARGE NOTE PROVIDER - NSDCMRMEDTOKEN_GEN_ALL_CORE_FT
Artificial Tears ophthalmic solution: 1 drop(s) to each affected eye 4 times a day  baclofen 10 mg oral tablet: 1 tab(s) by gastrostomy tube 3 times a day  benzonatate 100 mg oral capsule: 1 cap(s) orally every 8 hours, As needed, Cough  docusate sodium 60 mg/15 mL oral syrup: 100 milligram(s) by gastrostomy tube once a day, As Needed for constipation  guaifenesin-dextromethorphan 100 mg-10 mg/5 mL oral liquid: 5 milliliter(s) orally every 6 hours, As needed, Cough  MiraLax oral powder for reconstitution: 17 gram(s) by gastrostomy tube once a day   oxybutynin 10 mg/24 hr oral tablet, extended release: 1 tab(s) orally once a day   Oysco 500 (1250 mg calcium carbonate) oral tablet: 1 tab(s) by gastrostomy tube 2 times a day  PHENobarbital 64.8 mg oral tablet: 1 tab(s) orally once a day via G tube  senna oral tablet: 2 tab(s) orally once a day (at bedtime)  Ventolin HFA 90 mcg/inh inhalation aerosol: 2 puff(s) inhaled 4 times a day, As Needed   Artificial Tears ophthalmic solution: 1 drop(s) to each affected eye 4 times a day  baclofen 10 mg oral tablet: 1 tab(s) by gastrostomy tube 3 times a day  Bactrim  mg-160 mg oral tablet: 1 tab(s) orally every 12 hours   benzonatate 100 mg oral capsule: 1 cap(s) orally every 8 hours, As needed, Cough  ceFAZolin 2 g intravenous injection: 2 gram(s) intravenous every 8 hours  docusate sodium 60 mg/15 mL oral syrup: 100 milligram(s) by gastrostomy tube once a day, As Needed for constipation  guaifenesin-dextromethorphan 100 mg-10 mg/5 mL oral liquid: 5 milliliter(s) orally every 6 hours, As needed, Cough  MiraLax oral powder for reconstitution: 17 gram(s) by gastrostomy tube once a day   oxybutynin 10 mg/24 hr oral tablet, extended release: 1 tab(s) orally once a day   Oysco 500 (1250 mg calcium carbonate) oral tablet: 1 tab(s) by gastrostomy tube 2 times a day  PHENobarbital 64.8 mg oral tablet: 1 tab(s) orally once a day via G tube  senna oral tablet: 2 tab(s) orally once a day (at bedtime)  Ventolin HFA 90 mcg/inh inhalation aerosol: 2 puff(s) inhaled 4 times a day, As Needed   Artificial Tears ophthalmic solution: 1 drop(s) to each affected eye 4 times a day  baclofen 10 mg oral tablet: 1 tab(s) by gastrostomy tube 3 times a day  Bactrim  mg-160 mg oral tablet: 1 tab(s) orally every 12 hours starting 9/24 till 10/6 via peg tube  benzonatate 100 mg oral capsule: 1 cap(s) orally every 8 hours, As needed, Cough  ceFAZolin 2 g intravenous injection: 2 gram(s) intravenous every 8 hours  docusate sodium 60 mg/15 mL oral syrup: 100 milligram(s) by gastrostomy tube once a day, As Needed for constipation  guaifenesin-dextromethorphan 100 mg-10 mg/5 mL oral liquid: 5 milliliter(s) orally every 6 hours, As needed, Cough  MiraLax oral powder for reconstitution: 17 gram(s) by gastrostomy tube once a day   oxybutynin 10 mg/24 hr oral tablet, extended release: 1 tab(s) orally once a day   Oysco 500 (1250 mg calcium carbonate) oral tablet: 1 tab(s) by gastrostomy tube 2 times a day  PHENobarbital 64.8 mg oral tablet: 1 tab(s) orally once a day via G tube  senna oral tablet: 2 tab(s) orally once a day (at bedtime)  Ventolin HFA 90 mcg/inh inhalation aerosol: 2 puff(s) inhaled 4 times a day, As Needed

## 2020-09-14 NOTE — DISCHARGE NOTE PROVIDER - NSDCCPCAREPLAN_GEN_ALL_CORE_FT
PRINCIPAL DISCHARGE DIAGNOSIS  Diagnosis: Pneumonia  Assessment and Plan of Treatment: -You were admitted with diagnosis of pneumonia complicated by bacteremia.   -Continue IV abx via midline x 14 days end 9/23, followed by 2 weeks PO bactrim 1 DS BID end 10/6

## 2020-09-14 NOTE — PROGRESS NOTE ADULT - ASSESSMENT
64 y.o. M w/ PMHx  of cerebral palsy from group home, seizure disorder, spastic paraplegia, s/p PEG tube, BPH, bladder neck stricture s/p suprapubic cath with history of ESBL in urine, Asthma; presenting with Fever and non-productive cough associated with SOB and admitted for pneumonia.    #Sepsis secondary to Pneumonia   - Sepsis resolved   - COVID PCR negative     - s/p Vancomycin & Cefepime  - ID on board: De-escalte to cefazolin as blood culture showed MSSA bactermia    # Bacteremia   - Blood culture: Staph Aureus & Group B strep detected   - Likely pneumonia is the source  - s/p Vancomycin & Cefepime   - ID on board:      - TTE: normal findings      - Repeat blood culture: No growth to date       - Continue IV Cefazolin 2g q8h for 14 days (end Sept 23) --> followed with Bactrim PO BID for 1 weeks (end Oct 6)      - Midline inserted for IV antibiotics upon discharge    #Hx of Asthma  - c/w Albuterol and Nebs PRN   - S/p IV steroids  - s/p Prednisone 40 PO QD for 5 days     #Thrombocytopenia likely reactive to active infection  -Initially concerns of HIT , but platelets are improving  -Likely related to sepsis   - Baseline around low 200's   - 112 --> 80 --> 99 --> 163 --> 233    #Normocytic anemia likely Anemia of Chronic Disease  - Hgb 10.5 --> 9.5 --> 10.6 --> 11.9      #Seizure Disorder: c/w Phenobarbital     #Hx of CP: c/w Baclofen     DVT PPX: Lovenox   Full Code  Dispo: Comes from group [facility].   64 y.o. M w/ PMHx  of cerebral palsy from group home, seizure disorder, spastic paraplegia, s/p PEG tube, BPH, bladder neck stricture s/p suprapubic cath with history of ESBL in urine, Asthma; presenting with Fever and non-productive cough associated with SOB and admitted for pneumonia.    #Sepsis secondary to Pneumonia   - Sepsis resolved   - COVID PCR negative     - s/p Vancomycin & Cefepime  - ID on board: De-escalte to cefazolin as blood culture showed MSSA bactermia    # Bacteremia   - Blood culture: Staph Aureus & Group B strep detected   - Likely pneumonia is the source  - s/p Vancomycin & Cefepime   - ID on board:      - TTE: normal findings      - Repeat blood culture: No growth to date       - Continue IV Cefazolin 2g q8h for 14 days (end Sept 23) --> followed with Bactrim PO BID for 1 weeks (end Oct 6)      - Midline inserted for IV antibiotics upon discharge    #Hx of Asthma  - c/w Albuterol and Nebs PRN   - S/p IV steroids  - s/p Prednisone 40 PO QD for 5 days -->stopped    #Thrombocytopenia likely reactive to active infection  -Initially concerns of HIT , but platelets are improving  -Likely related to sepsis   - Baseline around low 200's   - 112 --> 80 --> 99 --> 163 --> 233     #Normocytic anemia likely Anemia of Chronic Disease  - Hgb 10.5 --> 9.5 --> 10.6 --> 11.9    -stable    #Seizure Disorder: c/w Phenobarbital     #Hx of CP: c/w Baclofen     DVT PPX: Lovenox   Full Code  diet: on tube feeds  Dispo: Comes from group Home-->will be dc to [facility] for IV antibiotics-->will do covid swab today

## 2020-09-15 ENCOUNTER — TRANSCRIPTION ENCOUNTER (OUTPATIENT)
Age: 65
End: 2020-09-15

## 2020-09-15 VITALS
RESPIRATION RATE: 18 BRPM | DIASTOLIC BLOOD PRESSURE: 59 MMHG | TEMPERATURE: 96 F | HEART RATE: 92 BPM | SYSTOLIC BLOOD PRESSURE: 118 MMHG

## 2020-09-15 LAB
GLUCOSE BLDC GLUCOMTR-MCNC: 106 MG/DL — HIGH (ref 70–99)
GLUCOSE BLDC GLUCOMTR-MCNC: 115 MG/DL — HIGH (ref 70–99)
GLUCOSE BLDC GLUCOMTR-MCNC: 128 MG/DL — HIGH (ref 70–99)

## 2020-09-15 PROCEDURE — 99239 HOSP IP/OBS DSCHRG MGMT >30: CPT

## 2020-09-15 RX ORDER — SODIUM CHLORIDE 9 MG/ML
250 INJECTION INTRAMUSCULAR; INTRAVENOUS; SUBCUTANEOUS ONCE
Refills: 0 | Status: COMPLETED | OUTPATIENT
Start: 2020-09-15 | End: 2020-09-15

## 2020-09-15 RX ADMIN — Medication 1 DROP(S): at 06:11

## 2020-09-15 RX ADMIN — POLYETHYLENE GLYCOL 3350 17 GRAM(S): 17 POWDER, FOR SOLUTION ORAL at 11:46

## 2020-09-15 RX ADMIN — Medication 1 TABLET(S): at 06:11

## 2020-09-15 RX ADMIN — SODIUM CHLORIDE 1000 MILLILITER(S): 9 INJECTION INTRAMUSCULAR; INTRAVENOUS; SUBCUTANEOUS at 01:28

## 2020-09-15 RX ADMIN — Medication 1 DROP(S): at 11:47

## 2020-09-15 RX ADMIN — Medication 64.8 MILLIGRAM(S): at 11:46

## 2020-09-15 RX ADMIN — Medication 5 MILLIGRAM(S): at 06:11

## 2020-09-15 RX ADMIN — Medication 10 MILLIGRAM(S): at 06:11

## 2020-09-15 RX ADMIN — Medication 100 MILLIGRAM(S): at 06:08

## 2020-09-15 NOTE — PROGRESS NOTE ADULT - SUBJECTIVE AND OBJECTIVE BOX
DAILY PROGRESS NOTE  ===========================================================    Patient Information:  TISH SHAIKH  /  64y  /  Male  /  MRN#: [id number]    Hospital Day: 7d     |:::::::::::::::::::::::::::| SUBJECTIVE |:::::::::::::::::::::::::::|    64 y.o. M w/ PMHx  of cerebral palsy from group home ([facility]), seizure disorder, spastic paraplegia, s/p PEG tube, BPH, bladder neck stricture s/p suprapubic cath, history of ESBL, asthma; presented with fever and non-productive cough.   Per Group home staff, symptoms started at night with low grade fever of 100.5F, given Tylenol with improvement; however, coughing continued all night.   ED: septic with Leukocytosis of 11K, BP low 80's, Tmax: 103F (rectal), tachycardic 118bpm and Tachypneic 24, 94% O2 sat   - Px: bilateral rhonchi and expiratory wheezing   - Lactate 2.7 on VBG  - ABG with Respiratory Alkalosis likely from hyperventilation   - Given 2L LR bolus, Duoneb, Cefepime and Levofloxacin       OVERNIGHT EVENTS: No acute overnight events   TODAY: Patient was seen today at bedside. No SOB, chest pain, abd pain.     |:::::::::::::::::::::::::::| OBJECTIVE |:::::::::::::::::::::::::::|    VITAL SIGNS: Last 24 Hours  T(C): 35.8 (15 Sep 2020 07:50), Max: 37.2 (15 Sep 2020 01:22)  T(F): 96.5 (15 Sep 2020 07:50), Max: 99 (15 Sep 2020 01:22)  HR: 92 (15 Sep 2020 07:50) (60 - 92)  BP: 118/59 (15 Sep 2020 07:50) (86/60 - 118/59)  BP(mean): --  RR: 18 (15 Sep 2020 07:50) (18 - 20)  SpO2: --    09-14-20 @ 07:01  -  09-15-20 @ 07:00  --------------------------------------------------------  IN: 880 mL / OUT: 1750 mL / NET: -870 mL      PHYSICAL EXAM:  GENERAL:   Awake, alert; NAD.  HEENT: White patches on tongue. Sclera anicteric.   CARDIO:   Regular rate; Regular rhythm; S1 & S2.  RESP:   No rales, wheezing, or rhonchi appreciated.  GI:  PEG tube. Active BS; No guarding; No rebound tenderness., suprapubic catheter  EXT: UE in contracted position. Midline placed in right arm      LAB RESULTS:                      MICROBIOLOGY:    RADIOLOGY:    < from: Xray Chest 1 View-PORTABLE IMMEDIATE (09.09.20 @ 09:54) >  Lung parenchyma/Pleura: Stable bilateral opacities. Elevated left hemidiaphragm.    < end of copied text >        < from: Xray Kidney Ureter Bladder (09.09.20 @ 09:54) >  Nonspecific bowel gas pattern, noting generalized gaseous distention of colon. Similar bowel gas pattern had been seen on remote abdominal radiograph of 5/21/2020.    Gastrostomy tube overlying the left upper quadrant.      Osteopenia. Chronic hipdeformities. Degenerative changes of the spine.    < end of copied text >        < from: TTE Echo Complete w/o Contrast w/ Doppler (09.11.20 @ 14:25) >  Summary:   1. LV Ejection Fraction by Byrd's Method with a biplane EF of 66 %.   2. Normal left ventricular size and wall thicknesses, with normal systolic and diastolic function.   3. Normal left atrial size.   4. Normal right atrial size.   5. No evidence of mitral valve regurgitation.   6.LA volume Index is 14.6 ml/m² ml/m2.    PHYSICIAN INTERPRETATION:  Left Ventricle: Normal left ventricular size and wall thicknesses, with normal systolic and diastolic function. Spectral Doppler shows normal pattern of LV diastolic filling. Normal LV filling pressures.    < end of copied text >              ALLERGIES:  No Known Allergies      ===========================================================

## 2020-09-15 NOTE — DISCHARGE NOTE NURSING/CASE MANAGEMENT/SOCIAL WORK - PATIENT PORTAL LINK FT
You can access the FollowMyHealth Patient Portal offered by [facility] by registering at the following website: [email or web address]. By joining [employer]’s FollowMyHealth portal, you will also be able to view your health information using other applications (apps) compatible with our system.

## 2020-09-15 NOTE — PROVIDER CONTACT NOTE (OTHER) - ACTION/TREATMENT ORDERED:
MD Varela aware and notified of results, ordered to elevate patient's legs, bolus as ordered and recheck BP in an hour

## 2020-09-15 NOTE — PROGRESS NOTE ADULT - REASON FOR ADMISSION
Sepsis secondary to Pneumonia

## 2020-09-15 NOTE — PROGRESS NOTE ADULT - ASSESSMENT
64 y.o. M w/ PMHx  of cerebral palsy from group home, seizure disorder, spastic paraplegia, s/p PEG tube, BPH, bladder neck stricture s/p suprapubic cath with history of ESBL in urine, Asthma; presenting with Fever and non-productive cough associated with SOB and admitted for pneumonia.    #Sepsis secondary to Pneumonia   - Sepsis resolved   - COVID PCR negative     - s/p Vancomycin & Cefepime  - ID on board: De-escalte to cefazolin as blood culture showed MSSA bactermia  - COVID reswab: negative; will discharge to nursing home     # Bacteremia   - Blood culture: Staph Aureus & Group B strep detected   - Likely pneumonia is the source  - s/p Vancomycin & Cefepime   - ID on board:      - TTE: normal findings      - Repeat blood culture: No growth to date       - Continue IV Cefazolin 2g q8h for 14 days (end Sept 23) --> followed with Bactrim PO BID for 1 weeks (end Oct 6)      - Midline inserted for IV antibiotics upon discharge  - COVID reswab: negative; will discharge to nursing home     #Hx of Asthma  - c/w Albuterol and Nebs PRN   - S/p IV steroids  - s/p Prednisone 40 PO QD for 5 days -->stopped    #Thrombocytopenia likely reactive to active infection  -Initially concerns of HIT , but platelets are improving  -Likely related to sepsis   - Baseline around low 200's   - 112 --> 80 --> 99 --> 163 --> 233     #Normocytic anemia likely Anemia of Chronic Disease  - Hgb 10.5 --> 9.5 --> 10.6 --> 11.9    -stable    #Seizure Disorder: c/w Phenobarbital     #Hx of CP: c/w Baclofen     DVT PPX: Lovenox   Full Code  diet: on tube feeds 64 y.o. M w/ PMHx  of cerebral palsy from group home, seizure disorder, spastic paraplegia, s/p PEG tube, BPH, bladder neck stricture s/p suprapubic cath with history of ESBL in urine, Asthma; presenting with Fever and non-productive cough associated with SOB and admitted for pneumonia.    #Sepsis secondary to Pneumonia   - Sepsis resolved   - COVID PCR negative     - s/p Vancomycin & Cefepime  - ID on board: De-escalte to cefazolin as blood culture showed MSSA bactermia  - COVID reswab: negative; will discharge to nursing home     # Bacteremia   - Blood culture: Staph Aureus & Group B strep detected   - Likely pneumonia is the source  - s/p Vancomycin & Cefepime   - ID on board:      - TTE: normal findings      - Repeat blood culture: No growth to date       - Continue IV Cefazolin 2g q8h for 14 days (end Sept 23) --> followed with Bactrim PO BID for 1 weeks (end Oct 6)      - Midline inserted for IV antibiotics upon discharge  - COVID reswab: negative; will discharge to nursing home     #Hx of Asthma  - c/w Albuterol and Nebs PRN   - S/p IV steroids  - s/p Prednisone 40 PO QD for 5 days -->stopped    #Thrombocytopenia likely reactive to active infection  -Initially concerns of HIT , but platelets are improving  -Likely related to sepsis   - Baseline around low 200's   - 112 --> 80 --> 99 --> 163 --> 233     #Normocytic anemia likely Anemia of Chronic Disease  - Hgb 10.5 --> 9.5 --> 10.6 --> 11.9    -stable    #Seizure Disorder: c/w Phenobarbital     #Hx of CP: c/w Baclofen     DVT PPX: Lovenox   Full Code  diet: on tube feeds    PAtient seen and examined today. He is clinically and hemodynamically stable-->Discharge to nursing home

## 2020-09-15 NOTE — PROGRESS NOTE ADULT - PROVIDER SPECIALTY LIST ADULT
Hospitalist
Infectious Disease
Internal Medicine
Infectious Disease

## 2020-09-15 NOTE — PROGRESS NOTE ADULT - ATTENDING COMMENTS
patient seen and examined , agree with pgy 1 assesment and plan except as indicated above,   GEN Lying in no acute distress  HEENT Pupils equal and reactive to light and accommodationSupple Neck  PULM bilateral air entry  CV s1s2   GI + bowel sounds nontnender  EXT no cyanosis or edema  PSYCH awake alert   INTEG No Lesions  Progress Note Handoff    Pending:   dc to snf    Family discussion: kaye aware of plan of care    Disposition: SNF

## 2020-09-23 DIAGNOSIS — G80.1 SPASTIC DIPLEGIC CEREBRAL PALSY: ICD-10-CM

## 2020-09-23 DIAGNOSIS — E87.3 ALKALOSIS: ICD-10-CM

## 2020-09-23 DIAGNOSIS — N20.0 CALCULUS OF KIDNEY: ICD-10-CM

## 2020-09-23 DIAGNOSIS — Z93.1 GASTROSTOMY STATUS: ICD-10-CM

## 2020-09-23 DIAGNOSIS — D69.6 THROMBOCYTOPENIA, UNSPECIFIED: ICD-10-CM

## 2020-09-23 DIAGNOSIS — A41.01 SEPSIS DUE TO METHICILLIN SUSCEPTIBLE STAPHYLOCOCCUS AUREUS: ICD-10-CM

## 2020-09-23 DIAGNOSIS — F79 UNSPECIFIED INTELLECTUAL DISABILITIES: ICD-10-CM

## 2020-09-23 DIAGNOSIS — R33.8 OTHER RETENTION OF URINE: ICD-10-CM

## 2020-09-23 DIAGNOSIS — E87.2 ACIDOSIS: ICD-10-CM

## 2020-09-23 DIAGNOSIS — R53.2 FUNCTIONAL QUADRIPLEGIA: ICD-10-CM

## 2020-09-23 DIAGNOSIS — E87.1 HYPO-OSMOLALITY AND HYPONATREMIA: ICD-10-CM

## 2020-09-23 DIAGNOSIS — J45.909 UNSPECIFIED ASTHMA, UNCOMPLICATED: ICD-10-CM

## 2020-09-23 DIAGNOSIS — N40.1 BENIGN PROSTATIC HYPERPLASIA WITH LOWER URINARY TRACT SYMPTOMS: ICD-10-CM

## 2020-09-23 DIAGNOSIS — Z86.19 PERSONAL HISTORY OF OTHER INFECTIOUS AND PARASITIC DISEASES: ICD-10-CM

## 2020-09-23 DIAGNOSIS — D63.8 ANEMIA IN OTHER CHRONIC DISEASES CLASSIFIED ELSEWHERE: ICD-10-CM

## 2020-09-23 DIAGNOSIS — J15.211 PNEUMONIA DUE TO METHICILLIN SUSCEPTIBLE STAPHYLOCOCCUS AUREUS: ICD-10-CM

## 2020-09-23 DIAGNOSIS — G40.909 EPILEPSY, UNSPECIFIED, NOT INTRACTABLE, WITHOUT STATUS EPILEPTICUS: ICD-10-CM

## 2020-09-23 DIAGNOSIS — J15.3 PNEUMONIA DUE TO STREPTOCOCCUS, GROUP B: ICD-10-CM

## 2020-09-23 DIAGNOSIS — N39.0 URINARY TRACT INFECTION, SITE NOT SPECIFIED: ICD-10-CM

## 2020-09-23 DIAGNOSIS — D69.59 OTHER SECONDARY THROMBOCYTOPENIA: ICD-10-CM

## 2020-09-23 DIAGNOSIS — R65.20 SEVERE SEPSIS WITHOUT SEPTIC SHOCK: ICD-10-CM

## 2020-09-24 RX ORDER — AZTREONAM 2 G
1 VIAL (EA) INJECTION
Qty: 0 | Refills: 0 | DISCHARGE
Start: 2020-09-24 | End: 2020-10-06

## 2020-09-24 RX ORDER — AZTREONAM 2 G
1 VIAL (EA) INJECTION
Qty: 28 | Refills: 0
Start: 2020-09-24 | End: 2020-10-07

## 2020-10-14 NOTE — PHYSICAL EXAM
c/o mid sternal chest pain radiating thru to back on and off x 1 year. Todays episode started at 2am. Pt states he recently had a workup and nothing was found but he still has pain Vielka Leiva  Pager: [phone]. If no response or past 5 pm call [phone]. I have seen and examined the patient with the resident and team in AM. I agree with the resident finding assessment and plan as documented above unless noted below   Likely transfer to transplant hepatology service at [facility]- await transfer pending bed availability. GI note appreciated   - Monitor CMP, INR   - Continue IV ceftriaxone bacteremia secondary to SBP   d/w patient and his family at bedside. Agree with above.  etOH cirrhosis on transplant list.  Admitted with SBP. Now improved, no need for repeat tap as re-accumulation is minimal.  E. coli bacteremia, sensitive to Zosyn and ceftriaxone, so will switch to ceftriaxone.  Hemodynamically stable, off pressors since yesterday ~16:30    Can transfer to general medicine. Agree with above.  H/O etOH cirrhosis on transplant list (sober >3 years) p/w AMS / hypotension  Blood culture +E. coli possible SBP (given WBC on fluid 37.5k), although peritoneal fluid negative so far; no pressors.  On Zosyn for bacteremia day 1. MELD 30. On standing albumin.  Received low dose morphine and became transiently hypotensive.  Once stable, can transfer to general medicine. [General Appearance - In No Acute Distress] : in no acute distress [PERRL With Normal Accommodation] : pupils were equal in size, round, and reactive to light [Hearing Threshold Finger Rub Not [place]] : hearing was normal [Neck Appearance] : the appearance of the neck was normal [Auscultation Breath Sounds / Voice Sounds] : lungs were clear to auscultation bilaterally [Heart Sounds] : normal S1 and S2 [Edema] : there was no peripheral edema [Bowel Sounds] : normal bowel sounds [Abdomen Soft] : soft [Abdomen Tenderness] : non-tender [FreeTextEntry1] : distended

## 2020-10-15 ENCOUNTER — APPOINTMENT (OUTPATIENT)
Dept: UROLOGY | Facility: CLINIC | Age: 65
End: 2020-10-15

## 2020-10-28 ENCOUNTER — APPOINTMENT (OUTPATIENT)
Dept: UROLOGY | Facility: CLINIC | Age: 65
End: 2020-10-28
Payer: MEDICARE

## 2020-10-28 PROCEDURE — 51710 CHANGE OF BLADDER TUBE: CPT

## 2020-10-28 NOTE — PATIENT PROFILE ADULT - NSPROHMSYMPCOND_GEN_A_NUR
Patient verified and informed of UA result. Rx sent over to pharmacy. Will call again when other labs result.    genitourinary

## 2020-12-02 ENCOUNTER — APPOINTMENT (OUTPATIENT)
Dept: UROLOGY | Facility: CLINIC | Age: 65
End: 2020-12-02
Payer: MEDICARE

## 2020-12-02 PROCEDURE — 51710 CHANGE OF BLADDER TUBE: CPT

## 2021-01-14 ENCOUNTER — APPOINTMENT (OUTPATIENT)
Dept: UROLOGY | Facility: CLINIC | Age: 66
End: 2021-01-14
Payer: MEDICARE

## 2021-01-14 DIAGNOSIS — K59.09 OTHER CONSTIPATION: ICD-10-CM

## 2021-01-14 PROCEDURE — 51710 CHANGE OF BLADDER TUBE: CPT

## 2021-02-17 ENCOUNTER — APPOINTMENT (OUTPATIENT)
Dept: UROLOGY | Facility: CLINIC | Age: 66
End: 2021-02-17
Payer: MEDICARE

## 2021-02-17 PROCEDURE — 51710 CHANGE OF BLADDER TUBE: CPT
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
## 2021-03-02 ENCOUNTER — NON-APPOINTMENT (OUTPATIENT)
Age: 66
End: 2021-03-02

## 2021-03-17 ENCOUNTER — APPOINTMENT (OUTPATIENT)
Dept: UROLOGY | Facility: CLINIC | Age: 66
End: 2021-03-17
Payer: MEDICARE

## 2021-03-17 VITALS — TEMPERATURE: 98.6 F

## 2021-03-17 PROCEDURE — 51705 CHANGE OF BLADDER TUBE: CPT

## 2021-03-30 ENCOUNTER — APPOINTMENT (OUTPATIENT)
Dept: NEPHROLOGY | Facility: CLINIC | Age: 66
End: 2021-03-30

## 2021-04-01 ENCOUNTER — EMERGENCY (EMERGENCY)
Facility: HOSPITAL | Age: 66
LOS: 0 days | Discharge: HOME | End: 2021-04-01
Attending: STUDENT IN AN ORGANIZED HEALTH CARE EDUCATION/TRAINING PROGRAM | Admitting: STUDENT IN AN ORGANIZED HEALTH CARE EDUCATION/TRAINING PROGRAM
Payer: MEDICARE

## 2021-04-01 VITALS
HEIGHT: 62 IN | TEMPERATURE: 98 F | HEART RATE: 88 BPM | DIASTOLIC BLOOD PRESSURE: 72 MMHG | SYSTOLIC BLOOD PRESSURE: 119 MMHG | RESPIRATION RATE: 18 BRPM | OXYGEN SATURATION: 97 % | WEIGHT: 139.99 LBS

## 2021-04-01 VITALS
DIASTOLIC BLOOD PRESSURE: 72 MMHG | OXYGEN SATURATION: 95 % | RESPIRATION RATE: 18 BRPM | HEART RATE: 104 BPM | SYSTOLIC BLOOD PRESSURE: 106 MMHG | TEMPERATURE: 98 F

## 2021-04-01 DIAGNOSIS — Z93.1 GASTROSTOMY STATUS: Chronic | ICD-10-CM

## 2021-04-01 DIAGNOSIS — Z87.442 PERSONAL HISTORY OF URINARY CALCULI: ICD-10-CM

## 2021-04-01 DIAGNOSIS — Z79.2 LONG TERM (CURRENT) USE OF ANTIBIOTICS: ICD-10-CM

## 2021-04-01 DIAGNOSIS — Z98.890 OTHER SPECIFIED POSTPROCEDURAL STATES: Chronic | ICD-10-CM

## 2021-04-01 DIAGNOSIS — R05 COUGH: ICD-10-CM

## 2021-04-01 DIAGNOSIS — Z86.69 PERSONAL HISTORY OF OTHER DISEASES OF THE NERVOUS SYSTEM AND SENSE ORGANS: ICD-10-CM

## 2021-04-01 DIAGNOSIS — J18.9 PNEUMONIA, UNSPECIFIED ORGANISM: ICD-10-CM

## 2021-04-01 DIAGNOSIS — Z93.59 OTHER CYSTOSTOMY STATUS: Chronic | ICD-10-CM

## 2021-04-01 DIAGNOSIS — J45.909 UNSPECIFIED ASTHMA, UNCOMPLICATED: ICD-10-CM

## 2021-04-01 DIAGNOSIS — Z20.822 CONTACT WITH AND (SUSPECTED) EXPOSURE TO COVID-19: ICD-10-CM

## 2021-04-01 DIAGNOSIS — Z79.899 OTHER LONG TERM (CURRENT) DRUG THERAPY: ICD-10-CM

## 2021-04-01 LAB
ALBUMIN SERPL ELPH-MCNC: 4.1 G/DL — SIGNIFICANT CHANGE UP (ref 3.5–5.2)
ALP SERPL-CCNC: 96 U/L — SIGNIFICANT CHANGE UP (ref 30–115)
ALT FLD-CCNC: 11 U/L — SIGNIFICANT CHANGE UP (ref 0–41)
ANION GAP SERPL CALC-SCNC: 12 MMOL/L — SIGNIFICANT CHANGE UP (ref 7–14)
AST SERPL-CCNC: 20 U/L — SIGNIFICANT CHANGE UP (ref 0–41)
BASOPHILS # BLD AUTO: 0.03 K/UL — SIGNIFICANT CHANGE UP (ref 0–0.2)
BASOPHILS NFR BLD AUTO: 0.3 % — SIGNIFICANT CHANGE UP (ref 0–1)
BILIRUB SERPL-MCNC: 0.2 MG/DL — SIGNIFICANT CHANGE UP (ref 0.2–1.2)
BUN SERPL-MCNC: 20 MG/DL — SIGNIFICANT CHANGE UP (ref 10–20)
CALCIUM SERPL-MCNC: 9.4 MG/DL — SIGNIFICANT CHANGE UP (ref 8.5–10.1)
CHLORIDE SERPL-SCNC: 105 MMOL/L — SIGNIFICANT CHANGE UP (ref 98–110)
CO2 SERPL-SCNC: 26 MMOL/L — SIGNIFICANT CHANGE UP (ref 17–32)
CREAT SERPL-MCNC: 0.6 MG/DL — LOW (ref 0.7–1.5)
EOSINOPHIL # BLD AUTO: 0.25 K/UL — SIGNIFICANT CHANGE UP (ref 0–0.7)
EOSINOPHIL NFR BLD AUTO: 2.9 % — SIGNIFICANT CHANGE UP (ref 0–8)
GLUCOSE SERPL-MCNC: 101 MG/DL — HIGH (ref 70–99)
HCT VFR BLD CALC: 41 % — LOW (ref 42–52)
HGB BLD-MCNC: 14.1 G/DL — SIGNIFICANT CHANGE UP (ref 14–18)
IMM GRANULOCYTES NFR BLD AUTO: 0.2 % — SIGNIFICANT CHANGE UP (ref 0.1–0.3)
LYMPHOCYTES # BLD AUTO: 0.92 K/UL — LOW (ref 1.2–3.4)
LYMPHOCYTES # BLD AUTO: 10.5 % — LOW (ref 20.5–51.1)
MCHC RBC-ENTMCNC: 31.7 PG — HIGH (ref 27–31)
MCHC RBC-ENTMCNC: 34.4 G/DL — SIGNIFICANT CHANGE UP (ref 32–37)
MCV RBC AUTO: 92.1 FL — SIGNIFICANT CHANGE UP (ref 80–94)
MONOCYTES # BLD AUTO: 0.64 K/UL — HIGH (ref 0.1–0.6)
MONOCYTES NFR BLD AUTO: 7.3 % — SIGNIFICANT CHANGE UP (ref 1.7–9.3)
NEUTROPHILS # BLD AUTO: 6.91 K/UL — HIGH (ref 1.4–6.5)
NEUTROPHILS NFR BLD AUTO: 78.8 % — HIGH (ref 42.2–75.2)
NRBC # BLD: 0 /100 WBCS — SIGNIFICANT CHANGE UP (ref 0–0)
NT-PROBNP SERPL-SCNC: 29 PG/ML — SIGNIFICANT CHANGE UP (ref 0–300)
PLATELET # BLD AUTO: 250 K/UL — SIGNIFICANT CHANGE UP (ref 130–400)
POTASSIUM SERPL-MCNC: 5.2 MMOL/L — HIGH (ref 3.5–5)
POTASSIUM SERPL-SCNC: 5.2 MMOL/L — HIGH (ref 3.5–5)
PROT SERPL-MCNC: 7.2 G/DL — SIGNIFICANT CHANGE UP (ref 6–8)
RAPID RVP RESULT: SIGNIFICANT CHANGE UP
RBC # BLD: 4.45 M/UL — LOW (ref 4.7–6.1)
RBC # FLD: 13.2 % — SIGNIFICANT CHANGE UP (ref 11.5–14.5)
SARS-COV-2 RNA SPEC QL NAA+PROBE: SIGNIFICANT CHANGE UP
SODIUM SERPL-SCNC: 143 MMOL/L — SIGNIFICANT CHANGE UP (ref 135–146)
TROPONIN T SERPL-MCNC: <0.01 NG/ML — SIGNIFICANT CHANGE UP
WBC # BLD: 8.77 K/UL — SIGNIFICANT CHANGE UP (ref 4.8–10.8)
WBC # FLD AUTO: 8.77 K/UL — SIGNIFICANT CHANGE UP (ref 4.8–10.8)

## 2021-04-01 PROCEDURE — 93010 ELECTROCARDIOGRAM REPORT: CPT

## 2021-04-01 PROCEDURE — 71045 X-RAY EXAM CHEST 1 VIEW: CPT | Mod: 26

## 2021-04-01 PROCEDURE — 99285 EMERGENCY DEPT VISIT HI MDM: CPT | Mod: CS,GC

## 2021-04-01 NOTE — ED PROVIDER NOTE - PATIENT PORTAL LINK FT
You can access the FollowMyHealth Patient Portal offered by [facility] by registering at the following website: [email or web address]. By joining [employer]’s FollowMyHealth portal, you will also be able to view your health information using other applications (apps) compatible with our system.

## 2021-04-01 NOTE — ED PROVIDER NOTE - ATTENDING CONTRIBUTION TO CARE
66yo M with PMH of asthma, CP, MR, osteoporosis, contractures, prior PNA, BPH, urinary retention, and chronic G tube/indwelling alatorre presenting to ED with cough since yesterday. pt endorsing wet cough and congestion x1 day. no fevers/cp/sob/ap. no n/v/d. no recollection of significant aspiration event, but pt chronically at elevated risk for microaspiration    vss  gen- NAD, aaox3  card-rrr  lungs-ctab, no wheezing, mild lower obe rhonchi  abd-sntnd, no guarding or rebound  neuro- full str/sensation, cn ii-xii grossly intact, normal coordination    plan for labs, xr, covid swab, will provide supportive care, serial exam and ED observation period

## 2021-04-01 NOTE — PRE-ANESTHESIA EVALUATION ADULT - NSANTHASARD_GEN_ALL_CORE
4

## 2021-04-01 NOTE — ED PROVIDER NOTE - NSFOLLOWUPINSTRUCTIONS_ED_ALL_ED_FT
Please follow-up with your doctor in 1-3 days.    Pneumonia    Pneumonia is an infection of the lungs. Pneumonia may be caused by bacteria, viruses, or funguses. Symptoms include coughing, fever, chest pain when breathing deeply or coughing, shortness of breath, fatigue, or muscle aches. Pneumonia can be diagnosed with a medical history and physical exam, as well as other tests which may include a chest X-ray. If you were prescribed an antibiotic medicine, take it as told by your health care provider and do not stop taking the antibiotic even if you start to feel better. Do not use tobacco products, including cigarettes, chewing tobacco, and e-cigarettes.    SEEK IMMEDIATE MEDICAL CARE IF YOU HAVE ANY OF THE FOLLOWING SYMPTOMS: worsening shortness of breath, worsening chest pain, coughing up blood, change in mental status, lightheadedness/dizziness.

## 2021-04-01 NOTE — ED PROVIDER NOTE - CLINICAL SUMMARY MEDICAL DECISION MAKING FREE TEXT BOX
ed w/u benign, pt well appearing. no hyxpoxemia/fevers. however, given new cough and microaspiration risk, will cover w/ abx given possibility CXR will lag behind clinical picture and pt at risk for severe pna  will dc w/ abx, f/u instructions and return precautions

## 2021-04-01 NOTE — ED PROVIDER NOTE - PROGRESS NOTE DETAILS
Dienes- Covid negative, Xr with no significant consolidations, given first dose of abx in ED, sent rx for Levaquin to pt's pharmacy for further tx of PNA. VS stable. Covid negative. Caretaker understands plan of care. Spoke with at length. Full DC instructions and precaution signs and symptoms discussed. Proper follow up ensured.  Medications administered and prescribed/OTC home meds discussed.  All questions and concerns from patient addressed.  Understanding of instructions verbalized.

## 2021-04-01 NOTE — ED ADULT NURSE NOTE - NSIMPLEMENTINTERV_GEN_ALL_ED
Implemented All Fall with Harm Risk Interventions:  [place] to call system. Call bell, personal items and telephone within reach. Instruct patient to call for assistance. Room bathroom lighting operational. Non-slip footwear when patient is off stretcher. Physically safe environment: no spills, clutter or unnecessary equipment. Stretcher in lowest position, wheels locked, appropriate side rails in place. Provide visual cue, wrist band, yellow gown, etc. Monitor gait and stability. Monitor for mental status changes and reorient to person, place, and time. Review medications for side effects contributing to fall risk. Reinforce activity limits and safety measures with patient and family. Provide visual clues: red socks.

## 2021-04-01 NOTE — ED PROVIDER NOTE - NS ED ROS FT
Constitutional:  No fevers or chills.  Eyes:  No visual changes.  ENT:  No sore throat.  Neck:  No neck pain or stiffness.  Cardiac:  No CP or edema.  Resp:  +Cough, no hemoptysis.  GI:  No vomiting, diarrhea, or abdominal pain.  :  No hematuria.  Neuro:  No changes in mental status.  Skin:  No skin rash.

## 2021-04-01 NOTE — ED PROVIDER NOTE - PHYSICAL EXAMINATION
PHYSICAL EXAM: I have reviewed current vital signs.  GENERAL: NAD, chronically ill appearing.  HEAD:  Normocephalic, atraumatic.  EYES: Conjunctiva and sclera clear.  ENT: MMM.  NECK: Supple, no JVD.  CHEST/LUNG: Clear to auscultation bilaterally; no wheezes, rales, or rhonchi.  HEART: Regular rate and rhythm, normal S1 and S2; no murmurs, rubs, or gallops.  ABDOMEN: Soft, nontender, nondistended.  EXTREMITIES:  2+ peripheral pulses; chronic contractures in all extremities.  NEUROLOGY: A&O x 3. No changes from baseline, conversive, answering questions appropriately, able to provide some history.  SKIN: Warm and dry. PHYSICAL EXAM: I have reviewed current vital signs.  GENERAL: NAD, chronically ill appearing.  HEAD:  Normocephalic, atraumatic.  EYES: Conjunctiva and sclera clear.  ENT: MMM.  NECK: Supple, no JVD.  CHEST/LUNG: Clear to auscultation bilaterally; no wheezes/rales, scant rhonchi.  HEART: Regular rate and rhythm, normal S1 and S2; no murmurs, rubs, or gallops.  ABDOMEN: Soft, nontender, nondistended.  EXTREMITIES:  2+ peripheral pulses; chronic contractures in all extremities.  NEUROLOGY: A&O x 3. No changes from baseline, conversive, answering questions appropriately, able to provide some history.  SKIN: Warm and dry.

## 2021-04-01 NOTE — ED PROVIDER NOTE - OBJECTIVE STATEMENT
64yo M with PMH of asthma, CP, MR, osteoporosis, contractures, prior PNA, BPH, urinary retention, and chronic G tube/indwelling alatorre presenting to ED with cough since yesterday. Patient has had wet cough and congestion x 1 day, here in ED with caretaker Dayquan. No fevers, hemoptysis, CP, respiratory difficulty, abd pain, vomiting/diarrhea, bloody stool, injuries/traumas/falls, or changes from baseline mental status. Conversive, alert, able to answer some questions. Fully vaccinated for covid.

## 2021-04-13 ENCOUNTER — EMERGENCY (EMERGENCY)
Facility: HOSPITAL | Age: 66
LOS: 0 days | Discharge: HOME | End: 2021-04-13
Attending: EMERGENCY MEDICINE | Admitting: EMERGENCY MEDICINE
Payer: MEDICARE

## 2021-04-13 VITALS
OXYGEN SATURATION: 96 % | RESPIRATION RATE: 16 BRPM | HEART RATE: 91 BPM | HEIGHT: 62 IN | TEMPERATURE: 98 F | SYSTOLIC BLOOD PRESSURE: 117 MMHG | DIASTOLIC BLOOD PRESSURE: 76 MMHG

## 2021-04-13 DIAGNOSIS — Z79.899 OTHER LONG TERM (CURRENT) DRUG THERAPY: ICD-10-CM

## 2021-04-13 DIAGNOSIS — Z93.1 GASTROSTOMY STATUS: Chronic | ICD-10-CM

## 2021-04-13 DIAGNOSIS — Z86.69 PERSONAL HISTORY OF OTHER DISEASES OF THE NERVOUS SYSTEM AND SENSE ORGANS: ICD-10-CM

## 2021-04-13 DIAGNOSIS — Z79.2 LONG TERM (CURRENT) USE OF ANTIBIOTICS: ICD-10-CM

## 2021-04-13 DIAGNOSIS — Z98.890 OTHER SPECIFIED POSTPROCEDURAL STATES: Chronic | ICD-10-CM

## 2021-04-13 DIAGNOSIS — K94.23 GASTROSTOMY MALFUNCTION: ICD-10-CM

## 2021-04-13 DIAGNOSIS — Z87.442 PERSONAL HISTORY OF URINARY CALCULI: ICD-10-CM

## 2021-04-13 DIAGNOSIS — Z93.59 OTHER CYSTOSTOMY STATUS: Chronic | ICD-10-CM

## 2021-04-13 PROCEDURE — 99283 EMERGENCY DEPT VISIT LOW MDM: CPT | Mod: 25,GC

## 2021-04-13 PROCEDURE — 74018 RADEX ABDOMEN 1 VIEW: CPT | Mod: 26

## 2021-04-13 PROCEDURE — 43762 RPLC GTUBE NO REVJ TRC: CPT

## 2021-04-13 RX ORDER — IOHEXOL 300 MG/ML
30 INJECTION, SOLUTION INTRAVENOUS ONCE
Refills: 0 | Status: DISCONTINUED | OUTPATIENT
Start: 2021-04-13 | End: 2021-04-13

## 2021-04-13 NOTE — ED PROVIDER NOTE - OBJECTIVE STATEMENT
Patient is a 65 year old male, pmh cerebral palsy, seizure d/o, spastic paraplegia, s/p PEG tube, BPH, bladder neck stricture s/p suprapubic cath presenting today for dislodged G-tube. Hx obtained from patient and nurse Allison @ [phone] @ 0964. they report that this morning, as patient was being changed, the G- tube became dislodged and fell out. Patient reporting PEG placed > 6 months ago and reports most recent peg was placed 1 month ago. Nurse reporting PEG tube is 21 Czech with 7-10ml of fluid in balloon.     Additionally nurse notes patient was seen 1.5 weeks ago for cough, PNA ruled out and discharged. Patient and nurse reported isolated cough with no fevers, no chills, no nausea, no vomiting, no chest pain or difficulty breathing.

## 2021-04-13 NOTE — ED PROVIDER NOTE - PROGRESS NOTE DETAILS
ATTENDING NOTE: I personally evaluated the patient. I reviewed the Resident’s note (as assigned above), and agree with the findings and plan except as documented in my note. 66 y/o M PMH cerebral palsy, quadriplegic s/p peg tube placement is now here for a Peg tube replacement after it came out. Confirmed with nursing home at [facility]. On exam: Non-toxic, afebrile in the ED, ABD (+) slightly distended but non-tender. Progress authored by Dr. Lugo: S/P peg tube replaced, pending ER. Progress Authored by Dr. Lugo: XR DONE, contrast in stomach, will d/c. Progress authored by Dr. Lugo: S/P peg tube replaced, pending xr.

## 2021-04-13 NOTE — ED PROVIDER NOTE - PATIENT PORTAL LINK FT
You can access the FollowMyHealth Patient Portal offered by [facility] by registering at the following website: [email or web address]. By joining [employer]’s FollowMyHealth portal, you will also be able to view your health information using other applications (apps) compatible with our system.

## 2021-04-13 NOTE — ED PROVIDER NOTE - NS ED ROS FT
Eyes:  No visual changes, eye pain or discharge.  ENMT:  No hearing changes, pain, no sore throat or runny nose, no difficulty swallowing  Cardiac:  No chest pain, SOB or edema. No chest pain with exertion.  Respiratory:  isolated mild intermittent cough. no respiratory distress.    GI:  No nausea, vomiting, diarrhea or abdominal pain.  :  No dysuria, frequency or burning.  MS:  No myalgia, muscle weakness, joint pain or back pain.  Neuro:  No headache or weakness.  No LOC.  Skin:  No skin rash.   Endocrine: No history of thyroid disease or diabetes.

## 2021-04-13 NOTE — ED PROVIDER NOTE - CARE PROVIDER_API CALL
GAY STEVEN  [address] [place] [id number]  Phone: [phone]  Fax: [phone]  Follow Up Time:

## 2021-04-13 NOTE — ED PROVIDER NOTE - PHYSICAL EXAMINATION
CONSTITUTIONAL: Well-developed; well-nourished; in no acute distress, bilateral upper extremity contractures. wheelchair bound.   SKIN: warm, dry  HEAD: Normocephalic; atraumatic.  EYES: EOMI, no conjunctival erythema  ENT: No nasal discharge; airway clear.  NECK: Supple; non tender.  CARD: S1, S2 normal;  Regular rate and rhythm.   RESP: No wheezes, rales or rhonchi.  ABD: soft non tender, non distended, no rebound or guarding, fullness present   EXT: patient qith spastic quadraplegia.   LYMPH: No acute cervical adenopathy.  NEURO: Alert, oriented  PSYCH: Cooperative, appropriate.

## 2021-04-13 NOTE — ED ADULT NURSE NOTE - NSIMPLEMENTINTERV_GEN_ALL_ED
Implemented All Fall with Harm Risk Interventions:  [place] to call system. Call bell, personal items and telephone within reach. Instruct patient to call for assistance. Room bathroom lighting operational. Non-slip footwear when patient is off stretcher. Physically safe environment: no spills, clutter or unnecessary equipment. Stretcher in lowest position, wheels locked, appropriate side rails in place. Provide visual cue, wrist band, yellow gown, etc. Monitor gait and stability. Monitor for mental status changes and reorient to person, place, and time. Review medications for side effects contributing to fall risk. Reinforce activity limits and safety measures with patient and family. Provide visual clues: red socks.

## 2021-04-20 ENCOUNTER — APPOINTMENT (OUTPATIENT)
Dept: UROLOGY | Facility: CLINIC | Age: 66
End: 2021-04-20
Payer: MEDICARE

## 2021-04-20 VITALS
BODY MASS INDEX: 25.83 KG/M2 | HEART RATE: 83 BPM | TEMPERATURE: 97.6 F | HEIGHT: 65 IN | DIASTOLIC BLOOD PRESSURE: 77 MMHG | WEIGHT: 155 LBS | SYSTOLIC BLOOD PRESSURE: 122 MMHG

## 2021-04-20 PROCEDURE — 51710 CHANGE OF BLADDER TUBE: CPT

## 2021-04-28 NOTE — PATIENT PROFILE ADULT - NSPROPOAPRESSUREINJURY_GEN_A_NUR
Received from cath lab. Right radial arterial cath site stable. vasc band and armboard cont. Pt aware to keep right arm still and to not lift, push or pull with it. Taking sips of water. Denies pain or needs. 0.9 normal saline cont. Resting with easy resp. Wife at bedside. no

## 2021-05-02 NOTE — ED PROVIDER NOTE - PRINCIPAL DIAGNOSIS
Pt reported chest pain upon rising.  VS WNL, radial pulse +2 regular.  Pt reports sharp pain left of sternum at 4th intercostal space, does not radiate, increases with deep respiration, tender to palpation.  Pt reports some SOB, heaviness of chest, and moderate anxiety surrounding symptoms.  Medicine paged.     Attention to G-tube

## 2021-05-13 NOTE — ED ADULT NURSE NOTE - EXTENSIONS OF SELF_ADULT
Airway  Urgency: elective    Date/Time: 5/13/2021 7:04 AM  Airway not difficult    General Information and Staff    Patient location during procedure: OR  Anesthesiologist: Kenton Calderon MD  CRNA: Loreta Jung CRNA    Indications and Patient Condition  Indications for airway management: airway protection    Preoxygenated: yes  MILS not maintained throughout  Mask difficulty assessment: 1 - vent by mask    Final Airway Details  Final airway type: endotracheal airway      Successful airway: ETT  Cuffed: yes   Successful intubation technique: direct laryngoscopy  Facilitating devices/methods: intubating stylet  Endotracheal tube insertion site: oral  Blade: Jb  Blade size: 3  ETT size (mm): 7.0  Cormack-Lehane Classification: grade I - full view of glottis  Placement verified by: chest auscultation   Cuff volume (mL): 8  Measured from: lips  Number of attempts at approach: 1  Assessment: lips, teeth, and gum same as pre-op and atraumatic intubation    Additional Comments  PreO2 100% face mask, IV induction, easy mask, DVL x1, cords noted, tube through, cuff up, EBBSH, +etCO2, = chest movement, tube secured in place, atraumatic, teeth and lips intact as preop.             
None

## 2021-05-20 ENCOUNTER — APPOINTMENT (OUTPATIENT)
Dept: UROLOGY | Facility: CLINIC | Age: 66
End: 2021-05-20
Payer: MEDICARE

## 2021-05-20 VITALS
BODY MASS INDEX: 25.83 KG/M2 | SYSTOLIC BLOOD PRESSURE: 92 MMHG | HEIGHT: 65 IN | TEMPERATURE: 98.7 F | WEIGHT: 155 LBS | DIASTOLIC BLOOD PRESSURE: 57 MMHG | HEART RATE: 71 BPM

## 2021-05-20 PROCEDURE — 51710 CHANGE OF BLADDER TUBE: CPT

## 2021-05-25 ENCOUNTER — OUTPATIENT (OUTPATIENT)
Dept: OUTPATIENT SERVICES | Facility: HOSPITAL | Age: 66
LOS: 1 days | Discharge: HOME | End: 2021-05-25

## 2021-05-25 ENCOUNTER — APPOINTMENT (OUTPATIENT)
Dept: NEPHROLOGY | Facility: CLINIC | Age: 66
End: 2021-05-25
Payer: MEDICARE

## 2021-05-25 DIAGNOSIS — G80.9 CEREBRAL PALSY, UNSPECIFIED: ICD-10-CM

## 2021-05-25 DIAGNOSIS — Z93.1 GASTROSTOMY STATUS: Chronic | ICD-10-CM

## 2021-05-25 DIAGNOSIS — Z93.59 OTHER CYSTOSTOMY STATUS: Chronic | ICD-10-CM

## 2021-05-25 DIAGNOSIS — N20.0 CALCULUS OF KIDNEY: ICD-10-CM

## 2021-05-25 DIAGNOSIS — Z98.890 OTHER SPECIFIED POSTPROCEDURAL STATES: Chronic | ICD-10-CM

## 2021-05-25 DIAGNOSIS — N13.2 HYDRONEPHROSIS WITH RENAL AND URETERAL CALCULOUS OBSTRUCTION: ICD-10-CM

## 2021-05-25 DIAGNOSIS — N40.1 BENIGN PROSTATIC HYPERPLASIA WITH LOWER URINARY TRACT SYMPTOMS: ICD-10-CM

## 2021-05-25 PROCEDURE — 99214 OFFICE O/P EST MOD 30 MIN: CPT | Mod: GC

## 2021-05-27 NOTE — PHYSICAL EXAM
[General Appearance - Alert] : alert [General Appearance - Well Nourished] : well nourished [] : no respiratory distress [Auscultation Breath Sounds / Voice Sounds] : lungs were clear to auscultation bilaterally [Apical Impulse] : the apical impulse was normal [Heart Rate And Rhythm] : heart rate was normal and rhythm regular [Heart Sounds] : normal S1 and S2 [Bowel Sounds] : normal bowel sounds [Abdomen Soft] : soft [Abdomen Tenderness] : non-tender [FreeTextEntry1] : + suprapubic catheter

## 2021-05-27 NOTE — HISTORY OF PRESENT ILLNESS
[FreeTextEntry1] : 65 y/o M w/ PMhx of CP (wheelchair bound), s/p PEG, s/p suprapubic catheter d/t urethral strictures here for follow up for nephrolithiasis. Overall patient feels well, denies any back pain, hematuria, chest pain, muscle pain, weakness.

## 2021-05-27 NOTE — END OF VISIT
[] : Resident [FreeTextEntry3] : Saw and examined patient.  Agree with resident's A&P. \par 65M with PMH of nephrolithiasis presents for routine follow up.  \par Unknown stone composition, pt on potassium citrate, cannot switch to potassium chloride (as requested by NH), will switch to K-citrate powder form.  Pt to drink plenty of fluids with goal urine output >2L daily.  Obtain 24hr urine collection for stone work up.

## 2021-05-27 NOTE — ASSESSMENT
[FreeTextEntry1] : 64 y/o M w/ PMhx of CP (wheelchair bound), s/p PEG, s/p suprapubic catheter d/t urethral strictures here for follow up for nephrolithiasis after 24 h urine collection. Patient is s/p kidney stone removal in June and had Kidney Bladder US which was negative. Patient is following Urology for a leaking suprapubic catheter and will get it fixed over the month. \par  \par #nephrolithiasis\par - CMP 11/2019 WNL\par - 11/2019 CTAP: no hydronephrosis, punctate nonobstructing R upper pole calculus\par - Renal US was negative for any hydronephrosis or kidney stones\par - c/w potassium citrate, cannot switch to potassium chloride as per NH request\par - send for Urine 24hr collections. repeat CMP. UA/Ucx\par - repeat Renal US \par - advise drinking a lot of water\par - f/u in 6 months

## 2021-06-01 ENCOUNTER — NON-APPOINTMENT (OUTPATIENT)
Age: 66
End: 2021-06-01

## 2021-06-02 NOTE — ED PROCEDURE NOTE - PROCEDURE NAME, MLM
Peripheral Line Insertion
19 y/o female with PMH of anemia and obesity present for PST.  Patient c/o right sided mass for approx 2 years which causes pain at times.  Denies recent infection, bleeding, fever, fatigue or dizziness.  Scheduled for parotidectomy w/facial NIM facial nerve monitor with Dr Mercer on 06/04/2021.  COVID-19 testing not required - patient vaccinated- card on chart

## 2021-06-17 ENCOUNTER — INPATIENT (INPATIENT)
Facility: HOSPITAL | Age: 66
LOS: 18 days | Discharge: NURSING, REHAB OR LONG-TERM CARE | End: 2021-07-06
Attending: HOSPITALIST | Admitting: HOSPITALIST
Payer: MEDICARE

## 2021-06-17 VITALS
RESPIRATION RATE: 17 BRPM | SYSTOLIC BLOOD PRESSURE: 164 MMHG | TEMPERATURE: 98 F | HEIGHT: 62 IN | DIASTOLIC BLOOD PRESSURE: 72 MMHG | HEART RATE: 100 BPM | OXYGEN SATURATION: 91 %

## 2021-06-17 DIAGNOSIS — Z98.890 OTHER SPECIFIED POSTPROCEDURAL STATES: Chronic | ICD-10-CM

## 2021-06-17 DIAGNOSIS — Z93.1 GASTROSTOMY STATUS: Chronic | ICD-10-CM

## 2021-06-17 DIAGNOSIS — Z93.59 OTHER CYSTOSTOMY STATUS: Chronic | ICD-10-CM

## 2021-06-17 LAB
ALBUMIN SERPL ELPH-MCNC: 4.4 G/DL — SIGNIFICANT CHANGE UP (ref 3.5–5.2)
ALP SERPL-CCNC: 111 U/L — SIGNIFICANT CHANGE UP (ref 30–115)
ALT FLD-CCNC: 10 U/L — SIGNIFICANT CHANGE UP (ref 0–41)
ANION GAP SERPL CALC-SCNC: 11 MMOL/L — SIGNIFICANT CHANGE UP (ref 7–14)
ANISOCYTOSIS BLD QL: SLIGHT — SIGNIFICANT CHANGE UP
APPEARANCE UR: ABNORMAL
AST SERPL-CCNC: 18 U/L — SIGNIFICANT CHANGE UP (ref 0–41)
BACTERIA # UR AUTO: NEGATIVE — SIGNIFICANT CHANGE UP
BASOPHILS # BLD AUTO: 0.1 K/UL — SIGNIFICANT CHANGE UP (ref 0–0.2)
BASOPHILS NFR BLD AUTO: 0.9 % — SIGNIFICANT CHANGE UP (ref 0–1)
BILIRUB DIRECT SERPL-MCNC: <0.2 MG/DL — SIGNIFICANT CHANGE UP (ref 0–0.2)
BILIRUB INDIRECT FLD-MCNC: >0.2 MG/DL — SIGNIFICANT CHANGE UP (ref 0.2–1.2)
BILIRUB SERPL-MCNC: 0.4 MG/DL — SIGNIFICANT CHANGE UP (ref 0.2–1.2)
BILIRUB UR-MCNC: NEGATIVE — SIGNIFICANT CHANGE UP
BUN SERPL-MCNC: 23 MG/DL — HIGH (ref 10–20)
CALCIUM SERPL-MCNC: 9.5 MG/DL — SIGNIFICANT CHANGE UP (ref 8.5–10.1)
CHLORIDE SERPL-SCNC: 100 MMOL/L — SIGNIFICANT CHANGE UP (ref 98–110)
CO2 SERPL-SCNC: 28 MMOL/L — SIGNIFICANT CHANGE UP (ref 17–32)
COLOR SPEC: ABNORMAL
COMMENT - URINE: SIGNIFICANT CHANGE UP
CREAT SERPL-MCNC: 0.7 MG/DL — SIGNIFICANT CHANGE UP (ref 0.7–1.5)
DIFF PNL FLD: NEGATIVE — SIGNIFICANT CHANGE UP
EOSINOPHIL # BLD AUTO: 0 K/UL — SIGNIFICANT CHANGE UP (ref 0–0.7)
EOSINOPHIL NFR BLD AUTO: 0 % — SIGNIFICANT CHANGE UP (ref 0–8)
EPI CELLS # UR: 0 /HPF — SIGNIFICANT CHANGE UP (ref 0–5)
GLUCOSE SERPL-MCNC: 135 MG/DL — HIGH (ref 70–99)
GLUCOSE UR QL: NEGATIVE — SIGNIFICANT CHANGE UP
HCT VFR BLD CALC: 41.2 % — LOW (ref 42–52)
HGB BLD-MCNC: 14.3 G/DL — SIGNIFICANT CHANGE UP (ref 14–18)
HYALINE CASTS # UR AUTO: 0 /LPF — SIGNIFICANT CHANGE UP (ref 0–7)
KETONES UR-MCNC: NEGATIVE — SIGNIFICANT CHANGE UP
LACTATE SERPL-SCNC: 2.2 MMOL/L — HIGH (ref 0.7–2)
LEUKOCYTE ESTERASE UR-ACNC: ABNORMAL
LIDOCAIN IGE QN: 21 U/L — SIGNIFICANT CHANGE UP (ref 7–60)
LYMPHOCYTES # BLD AUTO: 0.18 K/UL — LOW (ref 1.2–3.4)
LYMPHOCYTES # BLD AUTO: 1.7 % — LOW (ref 20.5–51.1)
MACROCYTES BLD QL: SLIGHT — SIGNIFICANT CHANGE UP
MANUAL SMEAR VERIFICATION: SIGNIFICANT CHANGE UP
MCHC RBC-ENTMCNC: 31.6 PG — HIGH (ref 27–31)
MCHC RBC-ENTMCNC: 34.7 G/DL — SIGNIFICANT CHANGE UP (ref 32–37)
MCV RBC AUTO: 90.9 FL — SIGNIFICANT CHANGE UP (ref 80–94)
MONOCYTES # BLD AUTO: 0.47 K/UL — SIGNIFICANT CHANGE UP (ref 0.1–0.6)
MONOCYTES NFR BLD AUTO: 4.4 % — SIGNIFICANT CHANGE UP (ref 1.7–9.3)
NEUTROPHILS # BLD AUTO: 9.93 K/UL — HIGH (ref 1.4–6.5)
NEUTROPHILS NFR BLD AUTO: 93 % — HIGH (ref 42.2–75.2)
NITRITE UR-MCNC: NEGATIVE — SIGNIFICANT CHANGE UP
PH UR: >8.9 — HIGH (ref 5–8)
PLAT MORPH BLD: NORMAL — SIGNIFICANT CHANGE UP
PLATELET # BLD AUTO: 185 K/UL — SIGNIFICANT CHANGE UP (ref 130–400)
POLYCHROMASIA BLD QL SMEAR: SLIGHT — SIGNIFICANT CHANGE UP
POTASSIUM SERPL-MCNC: 4.4 MMOL/L — SIGNIFICANT CHANGE UP (ref 3.5–5)
POTASSIUM SERPL-SCNC: 4.4 MMOL/L — SIGNIFICANT CHANGE UP (ref 3.5–5)
PROT SERPL-MCNC: 7.4 G/DL — SIGNIFICANT CHANGE UP (ref 6–8)
PROT UR-MCNC: ABNORMAL
RBC # BLD: 4.53 M/UL — LOW (ref 4.7–6.1)
RBC # FLD: 13.1 % — SIGNIFICANT CHANGE UP (ref 11.5–14.5)
RBC BLD AUTO: ABNORMAL
RBC CASTS # UR COMP ASSIST: 0 /HPF — SIGNIFICANT CHANGE UP (ref 0–4)
SODIUM SERPL-SCNC: 139 MMOL/L — SIGNIFICANT CHANGE UP (ref 135–146)
SP GR SPEC: 1.01 — SIGNIFICANT CHANGE UP (ref 1.01–1.03)
UROBILINOGEN FLD QL: SIGNIFICANT CHANGE UP
WBC # BLD: 10.68 K/UL — SIGNIFICANT CHANGE UP (ref 4.8–10.8)
WBC # FLD AUTO: 10.68 K/UL — SIGNIFICANT CHANGE UP (ref 4.8–10.8)
WBC UR QL: 0 /HPF — SIGNIFICANT CHANGE UP (ref 0–5)

## 2021-06-17 PROCEDURE — 99285 EMERGENCY DEPT VISIT HI MDM: CPT

## 2021-06-17 PROCEDURE — 93010 ELECTROCARDIOGRAM REPORT: CPT

## 2021-06-17 PROCEDURE — 99222 1ST HOSP IP/OBS MODERATE 55: CPT

## 2021-06-17 PROCEDURE — 74177 CT ABD & PELVIS W/CONTRAST: CPT | Mod: 26,MA

## 2021-06-17 RX ORDER — KETOROLAC TROMETHAMINE 30 MG/ML
30 SYRINGE (ML) INJECTION ONCE
Refills: 0 | Status: DISCONTINUED | OUTPATIENT
Start: 2021-06-17 | End: 2021-06-17

## 2021-06-17 RX ORDER — SODIUM CHLORIDE 9 MG/ML
1000 INJECTION, SOLUTION INTRAVENOUS ONCE
Refills: 0 | Status: COMPLETED | OUTPATIENT
Start: 2021-06-17 | End: 2021-06-17

## 2021-06-17 RX ORDER — MORPHINE SULFATE 50 MG/1
4 CAPSULE, EXTENDED RELEASE ORAL ONCE
Refills: 0 | Status: DISCONTINUED | OUTPATIENT
Start: 2021-06-17 | End: 2021-06-17

## 2021-06-17 RX ORDER — ONDANSETRON 8 MG/1
4 TABLET, FILM COATED ORAL ONCE
Refills: 0 | Status: COMPLETED | OUTPATIENT
Start: 2021-06-17 | End: 2021-06-17

## 2021-06-17 RX ORDER — CEFTRIAXONE 500 MG/1
1000 INJECTION, POWDER, FOR SOLUTION INTRAMUSCULAR; INTRAVENOUS ONCE
Refills: 0 | Status: COMPLETED | OUTPATIENT
Start: 2021-06-17 | End: 2021-06-17

## 2021-06-17 RX ADMIN — MORPHINE SULFATE 4 MILLIGRAM(S): 50 CAPSULE, EXTENDED RELEASE ORAL at 19:12

## 2021-06-17 RX ADMIN — MORPHINE SULFATE 4 MILLIGRAM(S): 50 CAPSULE, EXTENDED RELEASE ORAL at 18:56

## 2021-06-17 RX ADMIN — Medication 30 MILLIGRAM(S): at 20:41

## 2021-06-17 RX ADMIN — MORPHINE SULFATE 4 MILLIGRAM(S): 50 CAPSULE, EXTENDED RELEASE ORAL at 16:06

## 2021-06-17 RX ADMIN — SODIUM CHLORIDE 1000 MILLILITER(S): 9 INJECTION, SOLUTION INTRAVENOUS at 16:05

## 2021-06-17 RX ADMIN — CEFTRIAXONE 100 MILLIGRAM(S): 500 INJECTION, POWDER, FOR SOLUTION INTRAMUSCULAR; INTRAVENOUS at 20:41

## 2021-06-17 RX ADMIN — SODIUM CHLORIDE 1000 MILLILITER(S): 9 INJECTION, SOLUTION INTRAVENOUS at 19:12

## 2021-06-17 RX ADMIN — ONDANSETRON 4 MILLIGRAM(S): 8 TABLET, FILM COATED ORAL at 16:06

## 2021-06-17 NOTE — CONSULT NOTE ADULT - SUBJECTIVE AND OBJECTIVE BOX
UROLOGY:      HPI:  65 yo M Past PMHx of Cerebral Palsy, Seizure disorder, spastic paraplegia, BPH, Hx of Nephrolithiasis  incompetent bladder neck contracture s/p SPT presents to ED with Right sided flank pain. Patient seen and examined at bedside, Aide from [facility]. Patient reports feeling flank pain in the afternoon, radiating to RLQ, 10/10 with associated nausea/vomiting. Patient well known to Urology, patient had Right-sided PCNL . Denies any fever, chills, SOB, CP, dysuria, hematuria. SPT in place draining cloudy yellow urine.     In ED, patient recieved 8mg of Morphine, IVF, Zofran, patient reports pain is the same 10/10. CT A/P shows Delayed right nephrogram. Moderate right hydroureteronephrosis secondary to an obstructing right proximal ureteral stone. Stone size is difficult to measure due to extensive artifact. This stone appears to measure at least 6 mm craniocaudally (max ).    [ x ] A 10 Point Review of Systems was negative except where noted  `    PAST MEDICAL & SURGICAL HISTORY:  BPH (benign prostatic hyperplasia)    Cerebral palsy    Seizure  last seizure &gt;10 years ago    Osteoporosis    Spastic quadriplegia    Urinary calculi    Urinary retention    Asthma    S/P percutaneous endoscopic gastrostomy (PEG) tube placement    H/O cystoscopy    Suprapubic catheter        MEDICATIONS  (STANDING):    MEDICATIONS  (PRN):      Allergies    No Known Allergies    Intolerances        SOCIAL HISTORY: No illicit drug use    FAMILY HISTORY:      Vital Signs Last 24 Hrs  T(C): 36.7 (2021 14:47), Max: 36.7 (2021 14:47)  T(F): 98 (2021 14:47), Max: 98 (2021 14:47)  HR: 100 (2021 14:47) (100 - 100)  BP: 164/72 (2021 14:47) (164/72 - 164/72)  RR: 17 (2021 14:47) (17 - 17)  SpO2: 91% (2021 14:47) (91% - 91%)    Physical Exam:   Constitutional: NAD, well-developed  HEENT: EOMI  Neck: no pain  Back: No CVA tenderness  Respiratory: No accessory respiratory muscle use  Abd: Soft, NT/ND  no organomegally  no hernia  :    Extremities: no edema  Neurological: A/O x 3  Psychiatric: Normal mood, normal affect  Skin: No rashes    Labs                        14.3   10.68 )-----------( 185      ( 2021 16:27 )             41.2     2021 16:27    139    |  100    |  23     ----------------------------<  135    4.4     |  28     |  0.7      Ca    9.5        2021 16:27        Urinalysis Basic - ( 2021 16:27 )    Color: Orange / Appearance: Turbid / S.014 / pH: x  Gluc: x / Ketone: Negative  / Bili: Negative / Urobili: <2 mg/dL   Blood: x / Protein: 100 mg/dL / Nitrite: Negative   Leuk Esterase: Moderate / RBC: 0 /HPF / WBC 0 /HPF   Sq Epi: x / Non Sq Epi: 0 /HPF / Bacteria: Negative      I&O's Detail      RADIOLOGY/IMAGING:   < from: CT Abdomen and Pelvis w/ IV Cont (21 @ 18:58) >  EXAM:  CT ABDOMEN AND PELVIS IC            PROCEDURE DATE:  2021            INTERPRETATION:  CLINICAL HISTORY: Right flank pain.    TECHNIQUE: Contiguous axial CT images were obtained from the lower chest to the pubic symphysis following administration of Optiray intravenous contrast. Oral contrast was not administered. Reformatted images in the coronal and sagittal planes were acquired.    COMPARISON: CT abdomen pelvis dated 2020.      FINDINGS:    The study is degraded by motion and beam hardening artifact secondary to patient arm positioning.    LOWER CHEST: Bibasilar atelectasis.    HEPATOBILIARY: Left hepatic lobe hypodensity to small to characterize.    SPLEEN: Unremarkable.    PANCREAS: Unremarkable.    ADRENAL GLANDS: Unremarkable.    KIDNEYS: Delayed right nephrogram. Moderate right hydroureteronephrosis secondary to an obstructing right proximal ureteral stone. Stone size is difficult to measure due to extensive artifact. This stone appears to measure at least 6 mm craniocaudally (max ). Additional nonobstructing right renal calculi. Linear dense material in the left renal collecting system likely representing excreted contrast material. Possible punctate left upper pole renal calculus. Left renal hypodensity too small to characterize.    ABDOMINOPELVIC NODES: Unremarkable    PELVIC ORGANS: Suprapubic catheter within the urinary bladder. Bladder wall thickening.. Prostatic calcifications.    PERITONEUM/MESENTERY/BOWEL: Gaseous distention of sigmoid colon. No evidence of bowel obstruction free air or fluid collection. Unremarkable appendix. Gastrostomy tube in place.    BONES/SOFT TISSUES: Osteopenia. Degenerative changes of the spine. Chronic pelvic deformity. Small fluid within bilateral inguinal hernias.          IMPRESSION:    1.  Obstructing proximal right ureteral stone with moderate hydroureteronephrosis.  2.  Bladder wall thickening. Correlate with urinalysis.  3.  See body of the report for additional findings.       VALENTINA DORSEY MD; Attending Radiologist  This document has been electronically signed. 2021  7:33PM    < end of copied text >   UROLOGY:      HPI:  65 yo M Past PMHx of Cerebral Palsy, Seizure disorder, spastic paraplegia, BPH, Hx of Nephrolithiasis  incompetent bladder neck contracture s/p SPT presents to ED with Right sided flank pain. Patient seen and examined at bedside, Aide from [facility]. Patient reports feeling flank pain in the afternoon, radiating to RLQ, 10/10 with associated nausea/vomiting. Patient well known to Urology, patient had Right-sided PCNL . Denies any fever, chills, SOB, CP, dysuria, hematuria. SPT in place draining cloudy yellow urine.     In ED, patient recieved 8mg of Morphine, IVF, Zofran, patient reports pain is the same 10/10. CT A/P shows Delayed right nephrogram. Moderate right hydroureteronephrosis secondary to an obstructing right proximal ureteral stone. Stone size is difficult to measure due to extensive artifact. This stone appears to measure at least 6 mm craniocaudally (max ).    [ x ] A 10 Point Review of Systems was negative except where noted  `    PAST MEDICAL & SURGICAL HISTORY:  BPH (benign prostatic hyperplasia)    Cerebral palsy    Seizure  last seizure &gt;10 years ago    Osteoporosis    Spastic quadriplegia    Urinary calculi    Urinary retention    Asthma    S/P percutaneous endoscopic gastrostomy (PEG) tube placement    H/O cystoscopy    Suprapubic catheter        MEDICATIONS  (STANDING):    MEDICATIONS  (PRN):      Allergies    No Known Allergies    Intolerances        SOCIAL HISTORY: No illicit drug use    FAMILY HISTORY:      Vital Signs Last 24 Hrs  T(C): 36.7 (2021 14:47), Max: 36.7 (2021 14:47)  T(F): 98 (2021 14:47), Max: 98 (2021 14:47)  HR: 100 (2021 14:47) (100 - 100)  BP: 164/72 (2021 14:47) (164/72 - 164/72)  RR: 17 (2021 14:47) (17 - 17)  SpO2: 91% (2021 14:47) (91% - 91%)    Physical Exam:   Constitutional: NAD, well-developed  HEENT: EOMI  Neck: no pain  Back: moderate RCVA tenderness with palpation   Respiratory: unlabored breathing   Abd: +moderate RUQ tenderness, Soft, NT/ND  no organomegally  no hernia  :  26 Fr catheter SPT in place draining cloudy yellow urine with sediment   Extremities: no edema  Skin: No rashes    Labs                        14.3   10.68 )-----------( 185      ( 2021 16:27 )             41.2     2021 16:27    139    |  100    |  23     ----------------------------<  135    4.4     |  28     |  0.7      Ca    9.5        2021 16:27        Urinalysis Basic - ( 2021 16:27 )    Color: Orange / Appearance: Turbid / S.014 / pH: x  Gluc: x / Ketone: Negative  / Bili: Negative / Urobili: <2 mg/dL   Blood: x / Protein: 100 mg/dL / Nitrite: Negative   Leuk Esterase: Moderate / RBC: 0 /HPF / WBC 0 /HPF   Sq Epi: x / Non Sq Epi: 0 /HPF / Bacteria: Negative      I&O's Detail      RADIOLOGY/IMAGING:   < from: CT Abdomen and Pelvis w/ IV Cont (21 @ 18:58) >  EXAM:  CT ABDOMEN AND PELVIS IC            PROCEDURE DATE:  2021            INTERPRETATION:  CLINICAL HISTORY: Right flank pain.    TECHNIQUE: Contiguous axial CT images were obtained from the lower chest to the pubic symphysis following administration of Optiray intravenous contrast. Oral contrast was not administered. Reformatted images in the coronal and sagittal planes were acquired.    COMPARISON: CT abdomen pelvis dated 2020.      FINDINGS:    The study is degraded by motion and beam hardening artifact secondary to patient arm positioning.    LOWER CHEST: Bibasilar atelectasis.    HEPATOBILIARY: Left hepatic lobe hypodensity to small to characterize.    SPLEEN: Unremarkable.    PANCREAS: Unremarkable.    ADRENAL GLANDS: Unremarkable.    KIDNEYS: Delayed right nephrogram. Moderate right hydroureteronephrosis secondary to an obstructing right proximal ureteral stone. Stone size is difficult to measure due to extensive artifact. This stone appears to measure at least 6 mm craniocaudally (max ). Additional nonobstructing right renal calculi. Linear dense material in the left renal collecting system likely representing excreted contrast material. Possible punctate left upper pole renal calculus. Left renal hypodensity too small to characterize.    ABDOMINOPELVIC NODES: Unremarkable    PELVIC ORGANS: Suprapubic catheter within the urinary bladder. Bladder wall thickening.. Prostatic calcifications.    PERITONEUM/MESENTERY/BOWEL: Gaseous distention of sigmoid colon. No evidence of bowel obstruction free air or fluid collection. Unremarkable appendix. Gastrostomy tube in place.    BONES/SOFT TISSUES: Osteopenia. Degenerative changes of the spine. Chronic pelvic deformity. Small fluid within bilateral inguinal hernias.          IMPRESSION:    1.  Obstructing proximal right ureteral stone with moderate hydroureteronephrosis.  2.  Bladder wall thickening. Correlate with urinalysis.  3.  See body of the report for additional findings.       VALENTINA DORSEY MD; Attending Radiologist  This document has been electronically signed. 2021  7:33PM    < end of copied text >   UROLOGY:      HPI:  63 yo M Past PMHx of Cerebral Palsy, Seizure disorder, spastic paraplegia, BPH, Hx of Nephrolithiasis s/p pcnl x 2 with known incompetent bladder neck s/p SPT presents to ED with Right sided flank pain. Patient seen and examined at bedside, Aide from [facility]. Patient reports feeling flank pain in the afternoon, radiating to RLQ, 10/10 with associated nausea/vomiting. Patient well known to Urology, patient had Right-sided PCNL . Denies any fever, chills, SOB, CP, dysuria, hematuria. SPT in place draining cloudy yellow urine.     In ED, patient received 8mg of Morphine, IVF, Zofran, patient reports pain is the same 10/10. CT A/P shows Delayed right nephrogram. Moderate right hydroureteronephrosis secondary to an obstructing right proximal ureteral stone. Stone size is difficult to measure due to extensive artifact. This stone appears to measure at least 9 mm craniocaudally (max ). He also has a right upper pole stone.    [ x ] A 10 Point Review of Systems was negative except where noted  `    PAST MEDICAL & SURGICAL HISTORY:  BPH (benign prostatic hyperplasia)    Cerebral palsy    Seizure  last seizure &gt;10 years ago    Osteoporosis    Spastic quadriplegia    Urinary calculi    Urinary retention    Asthma    S/P percutaneous endoscopic gastrostomy (PEG) tube placement    H/O cystoscopy    Suprapubic catheter        MEDICATIONS  (STANDING):          levoFLOXacin 25 mg/mL oral solution: 30 milliliter(s) orally once a day   · 	Bactrim  mg-160 mg oral tablet: 1 tab(s) orally every 12 hours starting  till 10/6 via peg tube  · 	ceFAZolin 2 g intravenous injection: 2 gram(s) intravenous every 8 hours  · 	oxybutynin 10 mg/24 hr oral tablet, extended release: 1 tab(s) orally once a day   · 	senna oral tablet: 2 tab(s) orally once a day (at bedtime)  · 	benzonatate 100 mg oral capsule: 1 cap(s) orally every 8 hours, As needed, Cough  · 	guaifenesin-dextromethorphan 100 mg-10 mg/5 mL oral liquid: 5 milliliter(s) orally every 6 hours, As needed, Cough  · 	MiraLax oral powder for reconstitution: 17 gram(s) by gastrostomy tube once a day   · 	docusate sodium 60 mg/15 mL oral syrup: 100 milligram(s) by gastrostomy tube once a day, As Needed for constipation  · 	Oysco 500 (1250 mg calcium carbonate) oral tablet: 1 tab(s) by gastrostomy tube 2 times a day  · 	Ventolin HFA 90 mcg/inh inhalation aerosol: 2 puff(s) inhaled 4 times a day, As Needed  · 	Artificial Tears ophthalmic solution: 1 drop(s) to each affected eye 4 times a day  · 	baclofen 10 mg oral tablet: 1 tab(s) by gastrostomy tube 3 times a day  · 	PHENobarbital 64.8 mg oral tablet: 1 tab(s) orally once a day via G tube      Allergies    No Known Allergies        SOCIAL HISTORY: No illicit drug use    FAMILY HISTORY: N/C      Vital Signs Last 24 Hrs  T(C): 36.7 (2021 14:47), Max: 36.7 (2021 14:47)  T(F): 98 (2021 14:47), Max: 98 (2021 14:47)  HR: 100 (2021 14:47) (100 - 100)  BP: 164/72 (2021 14:47) (164/72 - 164/72)  RR: 17 (2021 14:47) (17 - 17)  SpO2: 91% (2021 14:47) (91% - 91%)    Physical Exam:   Constitutional: NAD, well-developed  HEENT: EOMI  Neck: no pain  Back: moderate RCVA tenderness with palpation   Respiratory: unlabored breathing   Abd: +moderate RUQ tenderness, Soft, NT/ND  no organomegally  no hernia  :  26 Fr catheter SPT in place draining cloudy yellow urine with sediment   Extremities: no edema  Skin: No rashes    Labs                        14.3   10.68 )-----------( 185      ( 2021 16:27 )             41.2     2021 16:27    139    |  100    |  23     ----------------------------<  135    4.4     |  28     |  0.7      Ca    9.5        2021 16:27        Urinalysis Basic - ( 2021 16:27 )    Color: Orange / Appearance: Turbid / S.014 / pH: x  Gluc: x / Ketone: Negative  / Bili: Negative / Urobili: <2 mg/dL   Blood: x / Protein: 100 mg/dL / Nitrite: Negative   Leuk Esterase: Moderate / RBC: 0 /HPF / WBC 0 /HPF   Sq Epi: x / Non Sq Epi: 0 /HPF / Bacteria: Negative      I&O's Detail      RADIOLOGY/IMAGING:   < from: CT Abdomen and Pelvis w/ IV Cont (21 @ 18:58) >  EXAM:  CT ABDOMEN AND PELVIS IC            PROCEDURE DATE:  2021            INTERPRETATION:  CLINICAL HISTORY: Right flank pain.    TECHNIQUE: Contiguous axial CT images were obtained from the lower chest to the pubic symphysis following administration of Optiray intravenous contrast. Oral contrast was not administered. Reformatted images in the coronal and sagittal planes were acquired.    COMPARISON: CT abdomen pelvis dated 2020.      FINDINGS:    The study is degraded by motion and beam hardening artifact secondary to patient arm positioning.    LOWER CHEST: Bibasilar atelectasis.    HEPATOBILIARY: Left hepatic lobe hypodensity to small to characterize.    SPLEEN: Unremarkable.    PANCREAS: Unremarkable.    ADRENAL GLANDS: Unremarkable.    KIDNEYS: Delayed right nephrogram. Moderate right hydroureteronephrosis secondary to an obstructing right proximal ureteral stone. Stone size is difficult to measure due to extensive artifact. This stone appears to measure at least 6 mm craniocaudally (max ). Additional nonobstructing right renal calculi. Linear dense material in the left renal collecting system likely representing excreted contrast material. Possible punctate left upper pole renal calculus. Left renal hypodensity too small to characterize.    ABDOMINOPELVIC NODES: Unremarkable    PELVIC ORGANS: Suprapubic catheter within the urinary bladder. Bladder wall thickening.. Prostatic calcifications.    PERITONEUM/MESENTERY/BOWEL: Gaseous distention of sigmoid colon. No evidence of bowel obstruction free air or fluid collection. Unremarkable appendix. Gastrostomy tube in place.    BONES/SOFT TISSUES: Osteopenia. Degenerative changes of the spine. Chronic pelvic deformity. Small fluid within bilateral inguinal hernias.          IMPRESSION:    1.  Obstructing proximal right ureteral stone with moderate hydroureteronephrosis.  2.  Bladder wall thickening. Correlate with urinalysis.  3.  See body of the report for additional findings.       VALENTINA DORSEY MD; Attending Radiologist  This document has been electronically signed. 2021  7:33PM    < end of copied text >   UROLOGY:      HPI:  63 yo M Past PMHx of Cerebral Palsy, Seizure disorder, spastic paraplegia, BPH, Hx of Nephrolithiasis s/p pcnl x 2 with known incompetent bladder neck s/p SPT presents to ED with Right sided flank pain. Patient seen and examined at bedside, Aide from [facility]. Patient reports feeling flank pain in the afternoon, radiating to RLQ, 10/10 with associated nausea/vomiting. Patient well known to Urology, patient had Right-sided PCNL . Denies any fever, chills, SOB, CP, dysuria, hematuria. SPT in place draining cloudy yellow urine.     In ED, patient received 8mg of Morphine, IVF, Zofran, patient reports pain is the same 10/10. CT A/P shows Delayed right nephrogram. Moderate right hydroureteronephrosis secondary to an obstructing right proximal ureteral stone. Stone size is difficult to measure due to extensive artifact. This stone appears to measure at least 9 mm craniocaudally (max ). He also has a right upper pole stone.    [ x ] A 10 Point Review of Systems was negative except where noted  `    PAST MEDICAL & SURGICAL HISTORY:  BPH (benign prostatic hyperplasia)    Cerebral palsy    Seizure  last seizure &gt;10 years ago    Osteoporosis    Spastic quadriplegia    Urinary calculi    Urinary retention    Asthma    S/P percutaneous endoscopic gastrostomy (PEG) tube placement    H/O cystoscopy    Suprapubic catheter        MEDICATIONS  (STANDING):          levoFLOXacin 25 mg/mL oral solution: 30 milliliter(s) orally once a day   · 	Bactrim  mg-160 mg oral tablet: 1 tab(s) orally every 12 hours starting  till 10/6 via peg tube  · 	ceFAZolin 2 g intravenous injection: 2 gram(s) intravenous every 8 hours  · 	oxybutynin 10 mg/24 hr oral tablet, extended release: 1 tab(s) orally once a day   · 	senna oral tablet: 2 tab(s) orally once a day (at bedtime)  · 	benzonatate 100 mg oral capsule: 1 cap(s) orally every 8 hours, As needed, Cough  · 	guaifenesin-dextromethorphan 100 mg-10 mg/5 mL oral liquid: 5 milliliter(s) orally every 6 hours, As needed, Cough  · 	MiraLax oral powder for reconstitution: 17 gram(s) by gastrostomy tube once a day   · 	docusate sodium 60 mg/15 mL oral syrup: 100 milligram(s) by gastrostomy tube once a day, As Needed for constipation  · 	Oysco 500 (1250 mg calcium carbonate) oral tablet: 1 tab(s) by gastrostomy tube 2 times a day  · 	Ventolin HFA 90 mcg/inh inhalation aerosol: 2 puff(s) inhaled 4 times a day, As Needed  · 	Artificial Tears ophthalmic solution: 1 drop(s) to each affected eye 4 times a day  · 	baclofen 10 mg oral tablet: 1 tab(s) by gastrostomy tube 3 times a day  · 	PHENobarbital 64.8 mg oral tablet: 1 tab(s) orally once a day via G tube      Allergies    No Known Allergies        SOCIAL HISTORY: No illicit drug use    FAMILY HISTORY: N/C      Vital Signs Last 24 Hrs  T(C): 36.7 (2021 14:47), Max: 36.7 (2021 14:47)  T(F): 98 (2021 14:47), Max: 98 (2021 14:47)  HR: 100 (2021 14:47) (100 - 100)  BP: 164/72 (2021 14:47) (164/72 - 164/72)  RR: 17 (2021 14:47) (17 - 17)  SpO2: 91% (2021 14:47) (91% - 91%)    Physical Exam:   Constitutional: NAD, well-developed  HEENT: EOMI  Neck: no pain  Back: moderate RCVA tenderness with palpation   Respiratory: unlabored breathing   Abd: +moderate RUQ tenderness, Soft, NT/ND  no organomegally  no hernia to my exam tense and distended but without guarding or rebound  :  26 Fr catheter SPT in place draining cloudy yellow urine with sediment   Extremities: no edema  Skin: No rashes    Labs                        14.3   10.68 )-----------( 185      ( 2021 16:27 )             41.2     2021 16:27    139    |  100    |  23     ----------------------------<  135    4.4     |  28     |  0.7      Ca    9.5        2021 16:27        Urinalysis Basic - ( 2021 16:27 )    Color: Orange / Appearance: Turbid / S.014 / pH: x  Gluc: x / Ketone: Negative  / Bili: Negative / Urobili: <2 mg/dL   Blood: x / Protein: 100 mg/dL / Nitrite: Negative   Leuk Esterase: Moderate / RBC: 0 /HPF / WBC 0 /HPF   Sq Epi: x / Non Sq Epi: 0 /HPF / Bacteria: Negative      I&O's Detail      RADIOLOGY/IMAGING:   < from: CT Abdomen and Pelvis w/ IV Cont (21 @ 18:58) >  EXAM:  CT ABDOMEN AND PELVIS IC            PROCEDURE DATE:  2021            INTERPRETATION:  CLINICAL HISTORY: Right flank pain.    TECHNIQUE: Contiguous axial CT images were obtained from the lower chest to the pubic symphysis following administration of Optiray intravenous contrast. Oral contrast was not administered. Reformatted images in the coronal and sagittal planes were acquired.    COMPARISON: CT abdomen pelvis dated 2020.      FINDINGS:    The study is degraded by motion and beam hardening artifact secondary to patient arm positioning.    LOWER CHEST: Bibasilar atelectasis.    HEPATOBILIARY: Left hepatic lobe hypodensity to small to characterize.    SPLEEN: Unremarkable.    PANCREAS: Unremarkable.    ADRENAL GLANDS: Unremarkable.    KIDNEYS: Delayed right nephrogram. Moderate right hydroureteronephrosis secondary to an obstructing right proximal ureteral stone. Stone size is difficult to measure due to extensive artifact. This stone appears to measure at least 6 mm craniocaudally (max ). Additional nonobstructing right renal calculi. Linear dense material in the left renal collecting system likely representing excreted contrast material. Possible punctate left upper pole renal calculus. Left renal hypodensity too small to characterize.    ABDOMINOPELVIC NODES: Unremarkable    PELVIC ORGANS: Suprapubic catheter within the urinary bladder. Bladder wall thickening.. Prostatic calcifications.    PERITONEUM/MESENTERY/BOWEL: Gaseous distention of sigmoid colon. No evidence of bowel obstruction free air or fluid collection. Unremarkable appendix. Gastrostomy tube in place.    BONES/SOFT TISSUES: Osteopenia. Degenerative changes of the spine. Chronic pelvic deformity. Small fluid within bilateral inguinal hernias.          IMPRESSION:    1.  Obstructing proximal right ureteral stone with moderate hydroureteronephrosis.  2.  Bladder wall thickening. Correlate with urinalysis.  3.  See body of the report for additional findings.       VALENTINA DORSEY MD; Attending Radiologist  This document has been electronically signed. 2021  7:33PM    < end of copied text >

## 2021-06-17 NOTE — ED ADULT NURSE NOTE - OBJECTIVE STATEMENT
right back pain started today with vomiting no diahrhea no blood in stool, no fever. chronic alatorre in place

## 2021-06-17 NOTE — ED ADULT TRIAGE NOTE - CHIEF COMPLAINT QUOTE
Pt complaining of b/l lower back pain radiating to flanks. hx of kidney stones. Pt wheel chair bound. catheter in place.

## 2021-06-17 NOTE — CONSULT NOTE ADULT - ASSESSMENT
63 yo M Past PMHx of Cerebral Palsy, Seizure disorder, spastic paraplegia, BPH, Hx of Nephrolithiasis  incompetent bladder neck contracture s/p SPT presents to ED with Right sided flank pain for 1 day. CT A/P shows Delayed right nephrogram. Moderate right hydroureteronephrosis secondary to an obstructing right proximal ureteral stone. Stone size is difficult to measure due to extensive artifact. This stone appears to measure at least 6 mm craniocaudally (max ).    A) Symptomatic Proximal ureteral stone   Incompetent bladder neck  Right Renal Colic     Plan:   Case discussed with Dr. Cooley and Dr. Cooper, plan as follows:   - Patient has a known incompetent bladder neck ; therefore, unable to perform cystoscopy/ureteroscopy from below to place stents. Will need a PCN placement to decompress bladder and for percutaneous access for future ESWL when medically stable   - IR c/s for PCN place  - Obtain KUB for stone evaluation, if stone seen possible future ESWL; however, if stone is not seen in KUB then possible PCN conversion to NephroU   - Analgesics for pain control   - IVF   - No acute  intervention at this time   - Will follow   - Case d/w attending   - Ed team  63 yo M Past PMHx of Cerebral Palsy, Seizure disorder, spastic paraplegia, BPH, Hx of Nephrolithiasis  incompetent bladder neck contracture s/p SPT presents to ED with Right sided flank pain for 1 day. CT A/P shows Delayed right nephrogram. Moderate right hydroureteronephrosis secondary to an obstructing right proximal ureteral stone. Stone size is difficult to measure due to extensive artifact. This stone appears to measure at least 6 mm craniocaudally (max ).    A) Symptomatic Proximal ureteral stone   Incompetent bladder neck  Right Renal Colic     Plan:   Case discussed with Dr. Cooley and Dr. Cooper, plan as follows:   - Patient stone size approximately 10cm x 4.4cm in R proximal ureter. Patient has a known incompetent bladder neck ; therefore, unable to perform cystoscopy/ureteroscopy from below to place stents. Will need a PCN placement to decompress bladder and for percutaneous access for future ESWL when medically stable   - IR c/s for PCN place  - Obtain KUB for stone evaluation, if stone seen possible future ESWL; however, if stone is not seen in KUB then possible PCN conversion to NephroU   - Analgesics for pain control   - IVF   - No acute  intervention at this time   - Will follow   - Case d/w attending   - Ed team  65 yo M Past PMHx of Cerebral Palsy, Seizure disorder, spastic paraplegia, BPH, Hx of Nephrolithiasis  incompetent bladder neck contracture s/p SPT presents to ED with Right sided flank pain for 1 day. CT A/P shows Delayed right nephrogram. Moderate right hydroureteronephrosis secondary to an obstructing right proximal ureteral stone. Stone size is difficult to measure due to extensive artifact. This stone appears to measure at least 6 mm craniocaudally (max ).    A) Symptomatic Proximal ureteral stone   Incompetent bladder neck  Right Renal Colic     Plan:   Case discussed with Dr. Cooley and Dr. Cooper, plan as follows:   - Patient stone size approximately 10 mm x 4.4 mm in R proximal ureter. Patient has a known incompetent bladder neck ; therefore, possible but not definitely able to perform cystoscopy/ureteroscopy from below to place stents. He may need a PCN / nu  placement to decompress bladder and for percutaneous access OR URETEROSCOPY or future ESWL when medically stable   - IR c/s for PCN place  - Obtain KUB for stone evaluation, if stone seen possible future ESWL; however, if stone is not seen in KUB then possible PCN conversion to NephroU   - Analgesics for pain control   - IVF   - No acute  intervention at this time   - Will follow   - Case d/w attending   - Ed team  63 yo M Past PMHx of Cerebral Palsy, Seizure disorder, spastic paraplegia, BPH, Hx of Nephrolithiasis  incompetent bladder neck contracture s/p SPT presents to ED with Right sided flank pain for 1 day. CT A/P shows Delayed right nephrogram. Moderate right hydroureteronephrosis secondary to an obstructing right proximal ureteral stone. Stone size is difficult to measure due to extensive artifact. This stone appears to measure at least 6 mm craniocaudally (max ).    A) Symptomatic Proximal ureteral stone   Incompetent bladder neck  Right Renal Colic     Plan:   Case discussed with Dr. Cooley and Dr. Cooper, plan as follows:   - Patient stone size approximately 10 mm x 4.4 mm in R proximal ureter. Patient has a known incompetent bladder neck ; therefore, possible but not definitely able to perform cystoscopy/ureteroscopy from below to place stents. He may need a PCN / nu  placement to decompress bladder and for percutaneous access OR URETEROSCOPY or future ESWL when medically stable   - IR c/s for PCN place  - Obtain KUB for stone evaluation, if stone seen possible future ESWL; however, if stone is not seen in KUB then possible PCN conversion to NephroU   - Analgesics for pain control   - IVF     note stomach distended and rectum FOS, ? surgical consult re tympany  films reviewed with PA and we will see if IR can give the access I want  - No acute  intervention at this time   - Will follow   - Case d/w attending   - Ed team

## 2021-06-17 NOTE — ED ADULT NURSE NOTE - NSIMPLEMENTINTERV_GEN_ALL_ED
Implemented All Fall Risk Interventions:  [place] to call system. Call bell, personal items and telephone within reach. Instruct patient to call for assistance. Room bathroom lighting operational. Non-slip footwear when patient is off stretcher. Physically safe environment: no spills, clutter or unnecessary equipment. Stretcher in lowest position, wheels locked, appropriate side rails in place. Provide visual cue, wrist band, yellow gown, etc. Monitor gait and stability. Monitor for mental status changes and reorient to person, place, and time. Review medications for side effects contributing to fall risk. Reinforce activity limits and safety measures with patient and family.

## 2021-06-17 NOTE — ED PROVIDER NOTE - NS ED ROS FT
Constitutional: (-) fever, (-) chills  Eyes: (-) visual changes  ENT: (-) nasal congestions  Cardiovascular: (-) chest pain, (-) syncope  Respiratory: (-) cough, (-) shortness of breath, (-) dyspnea,   Gastrointestinal: (+) vomiting, (-) diarrhea, (-)nausea,  Musculoskeletal: (-) neck pain, (+) back pain, (-) joint pain,  Integumentary: (-) rash, (-) edema, (-) bruises  Neurological: (-) headache, (-) loc, (-) dizziness, (-) tingling, (-)numbness,  Peripheral Vascular: (-) leg swelling  :  (-)dysuria,  (-) hematuria  Allergic/Immunologic: (-) pruritus

## 2021-06-17 NOTE — ED PROVIDER NOTE - PHYSICAL EXAMINATION
Physical Exam    Vital Signs: I have reviewed the initial vital signs.  Constitutional: well-nourished, appears stated age, no acute distress  Eyes: Conjunctiva pink, Sclera clear,  Cardiovascular: S1 and S2, regular rate, regular rhythm, well-perfused extremities, radial pulses equal and 2+  Respiratory: unlabored respiratory effort, clear to auscultation bilaterally no wheezing, rales and rhonchi  Gastrointestinal: soft, non-tender abdomen, no pulsatile mass, normal bowl sounds, peg and suprapubic cath in place  Musculoskeletal: supple neck,  no midline tenderness, right cva ttp  Integumentary: warm, dry, no rash  Neurologic: awake, alert, nvi

## 2021-06-17 NOTE — ED PROVIDER NOTE - PROGRESS NOTE DETAILS
I have personally performed a history and physical exam on this patient and personally directed the management of the patient. 66yo man h/o CP, BPH s/p suprapubic catheter, sz d/o, spastic quadriplegia c/o low back pain typical of prior kidney stones and vomiting today. VS, exam as noted, pt is baseline mental status but uncomfortable, lungs CTA, CVS1S2 RRR abd soft, suprapubic in place, scant urine in alatorre. Labs, CT, reassess. Endorsed to Dr Valdez to f/u imaging, dispo. recs appreciated by uro admit med, req kub will be followed by inpt team.

## 2021-06-17 NOTE — ED PROVIDER NOTE - CARE PLAN
Principal Discharge DX:	Kidney stone   Principal Discharge DX:	Kidney stone  Secondary Diagnosis:	UTI (urinary tract infection)

## 2021-06-18 LAB
A1C WITH ESTIMATED AVERAGE GLUCOSE RESULT: 4.8 % — SIGNIFICANT CHANGE UP (ref 4–5.6)
ALBUMIN SERPL ELPH-MCNC: 3.4 G/DL — LOW (ref 3.5–5.2)
ALP SERPL-CCNC: 111 U/L — SIGNIFICANT CHANGE UP (ref 30–115)
ALT FLD-CCNC: 9 U/L — SIGNIFICANT CHANGE UP (ref 0–41)
ANION GAP SERPL CALC-SCNC: 10 MMOL/L — SIGNIFICANT CHANGE UP (ref 7–14)
ANION GAP SERPL CALC-SCNC: 12 MMOL/L — SIGNIFICANT CHANGE UP (ref 7–14)
APPEARANCE UR: ABNORMAL
APTT BLD: 30.2 SEC — SIGNIFICANT CHANGE UP (ref 27–39.2)
AST SERPL-CCNC: 16 U/L — SIGNIFICANT CHANGE UP (ref 0–41)
BACTERIA # UR AUTO: NEGATIVE — SIGNIFICANT CHANGE UP
BASOPHILS # BLD AUTO: 0.01 K/UL — SIGNIFICANT CHANGE UP (ref 0–0.2)
BASOPHILS NFR BLD AUTO: 0.1 % — SIGNIFICANT CHANGE UP (ref 0–1)
BILIRUB SERPL-MCNC: 0.4 MG/DL — SIGNIFICANT CHANGE UP (ref 0.2–1.2)
BILIRUB UR-MCNC: NEGATIVE — SIGNIFICANT CHANGE UP
BLD GP AB SCN SERPL QL: SIGNIFICANT CHANGE UP
BUN SERPL-MCNC: 25 MG/DL — HIGH (ref 10–20)
BUN SERPL-MCNC: 26 MG/DL — HIGH (ref 10–20)
CALCIUM SERPL-MCNC: 8.1 MG/DL — LOW (ref 8.5–10.1)
CALCIUM SERPL-MCNC: 8.7 MG/DL — SIGNIFICANT CHANGE UP (ref 8.5–10.1)
CHLORIDE SERPL-SCNC: 103 MMOL/L — SIGNIFICANT CHANGE UP (ref 98–110)
CHLORIDE SERPL-SCNC: 105 MMOL/L — SIGNIFICANT CHANGE UP (ref 98–110)
CHOLEST SERPL-MCNC: 122 MG/DL — SIGNIFICANT CHANGE UP
CK MB CFR SERPL CALC: 4 NG/ML — SIGNIFICANT CHANGE UP (ref 0.6–6.3)
CO2 SERPL-SCNC: 24 MMOL/L — SIGNIFICANT CHANGE UP (ref 17–32)
CO2 SERPL-SCNC: 26 MMOL/L — SIGNIFICANT CHANGE UP (ref 17–32)
COLOR SPEC: ABNORMAL
CREAT SERPL-MCNC: 1 MG/DL — SIGNIFICANT CHANGE UP (ref 0.7–1.5)
CREAT SERPL-MCNC: 1 MG/DL — SIGNIFICANT CHANGE UP (ref 0.7–1.5)
DIFF PNL FLD: ABNORMAL
EOSINOPHIL # BLD AUTO: 0 K/UL — SIGNIFICANT CHANGE UP (ref 0–0.7)
EOSINOPHIL NFR BLD AUTO: 0 % — SIGNIFICANT CHANGE UP (ref 0–8)
EPI CELLS # UR: 13 /HPF — HIGH (ref 0–5)
ESTIMATED AVERAGE GLUCOSE: 91 MG/DL — SIGNIFICANT CHANGE UP (ref 68–114)
GLUCOSE BLDC GLUCOMTR-MCNC: 131 MG/DL — HIGH (ref 70–99)
GLUCOSE BLDC GLUCOMTR-MCNC: 147 MG/DL — HIGH (ref 70–99)
GLUCOSE SERPL-MCNC: 108 MG/DL — HIGH (ref 70–99)
GLUCOSE SERPL-MCNC: 118 MG/DL — HIGH (ref 70–99)
GLUCOSE UR QL: NEGATIVE — SIGNIFICANT CHANGE UP
HCT VFR BLD CALC: 37.2 % — LOW (ref 42–52)
HCT VFR BLD CALC: 37.8 % — LOW (ref 42–52)
HDLC SERPL-MCNC: 56 MG/DL — SIGNIFICANT CHANGE UP
HGB BLD-MCNC: 12.7 G/DL — LOW (ref 14–18)
HGB BLD-MCNC: 12.8 G/DL — LOW (ref 14–18)
HYALINE CASTS # UR AUTO: 0 /LPF — SIGNIFICANT CHANGE UP (ref 0–7)
IMM GRANULOCYTES NFR BLD AUTO: 0.3 % — SIGNIFICANT CHANGE UP (ref 0.1–0.3)
INR BLD: 1.3 RATIO — SIGNIFICANT CHANGE UP (ref 0.65–1.3)
IRON SATN MFR SERPL: 16 UG/DL — LOW (ref 35–150)
IRON SATN MFR SERPL: 7 % — LOW (ref 15–50)
KETONES UR-MCNC: NEGATIVE — SIGNIFICANT CHANGE UP
LACTATE SERPL-SCNC: 1.8 MMOL/L — SIGNIFICANT CHANGE UP (ref 0.7–2)
LACTATE SERPL-SCNC: 2.3 MMOL/L — HIGH (ref 0.7–2)
LEUKOCYTE ESTERASE UR-ACNC: ABNORMAL
LIPID PNL WITH DIRECT LDL SERPL: 61 MG/DL — SIGNIFICANT CHANGE UP
LYMPHOCYTES # BLD AUTO: 0.16 K/UL — LOW (ref 1.2–3.4)
LYMPHOCYTES # BLD AUTO: 2.4 % — LOW (ref 20.5–51.1)
MAGNESIUM SERPL-MCNC: 1.5 MG/DL — LOW (ref 1.8–2.4)
MAGNESIUM SERPL-MCNC: 1.6 MG/DL — LOW (ref 1.8–2.4)
MCHC RBC-ENTMCNC: 31.2 PG — HIGH (ref 27–31)
MCHC RBC-ENTMCNC: 31.3 PG — HIGH (ref 27–31)
MCHC RBC-ENTMCNC: 33.6 G/DL — SIGNIFICANT CHANGE UP (ref 32–37)
MCHC RBC-ENTMCNC: 34.4 G/DL — SIGNIFICANT CHANGE UP (ref 32–37)
MCV RBC AUTO: 91 FL — SIGNIFICANT CHANGE UP (ref 80–94)
MCV RBC AUTO: 92.9 FL — SIGNIFICANT CHANGE UP (ref 80–94)
MONOCYTES # BLD AUTO: 0.11 K/UL — SIGNIFICANT CHANGE UP (ref 0.1–0.6)
MONOCYTES NFR BLD AUTO: 1.6 % — LOW (ref 1.7–9.3)
NEUTROPHILS # BLD AUTO: 6.44 K/UL — SIGNIFICANT CHANGE UP (ref 1.4–6.5)
NEUTROPHILS NFR BLD AUTO: 95.6 % — HIGH (ref 42.2–75.2)
NITRITE UR-MCNC: NEGATIVE — SIGNIFICANT CHANGE UP
NON HDL CHOLESTEROL: 66 MG/DL — SIGNIFICANT CHANGE UP
NRBC # BLD: 0 /100 WBCS — SIGNIFICANT CHANGE UP (ref 0–0)
NRBC # BLD: 0 /100 WBCS — SIGNIFICANT CHANGE UP (ref 0–0)
PH UR: 7 — SIGNIFICANT CHANGE UP (ref 5–8)
PLATELET # BLD AUTO: 128 K/UL — LOW (ref 130–400)
PLATELET # BLD AUTO: 182 K/UL — SIGNIFICANT CHANGE UP (ref 130–400)
POTASSIUM SERPL-MCNC: 4.2 MMOL/L — SIGNIFICANT CHANGE UP (ref 3.5–5)
POTASSIUM SERPL-MCNC: 4.5 MMOL/L — SIGNIFICANT CHANGE UP (ref 3.5–5)
POTASSIUM SERPL-SCNC: 4.2 MMOL/L — SIGNIFICANT CHANGE UP (ref 3.5–5)
POTASSIUM SERPL-SCNC: 4.5 MMOL/L — SIGNIFICANT CHANGE UP (ref 3.5–5)
PROT SERPL-MCNC: 6.1 G/DL — SIGNIFICANT CHANGE UP (ref 6–8)
PROT UR-MCNC: ABNORMAL
PROTHROM AB SERPL-ACNC: 14.9 SEC — HIGH (ref 9.95–12.87)
RBC # BLD: 4.07 M/UL — LOW (ref 4.7–6.1)
RBC # BLD: 4.09 M/UL — LOW (ref 4.7–6.1)
RBC # FLD: 13.3 % — SIGNIFICANT CHANGE UP (ref 11.5–14.5)
RBC # FLD: 13.5 % — SIGNIFICANT CHANGE UP (ref 11.5–14.5)
RBC CASTS # UR COMP ASSIST: >720 /HPF — HIGH (ref 0–4)
SARS-COV-2 RNA SPEC QL NAA+PROBE: SIGNIFICANT CHANGE UP
SODIUM SERPL-SCNC: 139 MMOL/L — SIGNIFICANT CHANGE UP (ref 135–146)
SODIUM SERPL-SCNC: 141 MMOL/L — SIGNIFICANT CHANGE UP (ref 135–146)
SP GR SPEC: 1.02 — SIGNIFICANT CHANGE UP (ref 1.01–1.03)
TIBC SERPL-MCNC: 246 UG/DL — SIGNIFICANT CHANGE UP (ref 220–430)
TRIGL SERPL-MCNC: 56 MG/DL — SIGNIFICANT CHANGE UP
TROPONIN T SERPL-MCNC: 0.01 NG/ML — SIGNIFICANT CHANGE UP
TROPONIN T SERPL-MCNC: 0.13 NG/ML — CRITICAL HIGH
UIBC SERPL-MCNC: 230 UG/DL — SIGNIFICANT CHANGE UP (ref 110–370)
URATE SERPL-MCNC: 4.1 MG/DL — SIGNIFICANT CHANGE UP (ref 3.4–8.8)
UROBILINOGEN FLD QL: SIGNIFICANT CHANGE UP
WBC # BLD: 28.73 K/UL — HIGH (ref 4.8–10.8)
WBC # BLD: 6.74 K/UL — SIGNIFICANT CHANGE UP (ref 4.8–10.8)
WBC # FLD AUTO: 28.73 K/UL — HIGH (ref 4.8–10.8)
WBC # FLD AUTO: 6.74 K/UL — SIGNIFICANT CHANGE UP (ref 4.8–10.8)
WBC UR QL: 10 /HPF — HIGH (ref 0–5)

## 2021-06-18 PROCEDURE — 50437 DILAT XST TRC NEW ACCESS RCS: CPT | Mod: RT

## 2021-06-18 PROCEDURE — 71045 X-RAY EXAM CHEST 1 VIEW: CPT | Mod: 26

## 2021-06-18 PROCEDURE — ZZZZZ: CPT

## 2021-06-18 PROCEDURE — 93010 ELECTROCARDIOGRAM REPORT: CPT

## 2021-06-18 PROCEDURE — 99223 1ST HOSP IP/OBS HIGH 75: CPT

## 2021-06-18 PROCEDURE — 99233 SBSQ HOSP IP/OBS HIGH 50: CPT | Mod: 25

## 2021-06-18 PROCEDURE — 51705 CHANGE OF BLADDER TUBE: CPT

## 2021-06-18 PROCEDURE — 74018 RADEX ABDOMEN 1 VIEW: CPT | Mod: 26

## 2021-06-18 PROCEDURE — 71045 X-RAY EXAM CHEST 1 VIEW: CPT | Mod: 26,77

## 2021-06-18 RX ORDER — PHENOBARBITAL 60 MG
64.8 TABLET ORAL DAILY
Refills: 0 | Status: DISCONTINUED | OUTPATIENT
Start: 2021-06-18 | End: 2021-06-18

## 2021-06-18 RX ORDER — ENOXAPARIN SODIUM 100 MG/ML
40 INJECTION SUBCUTANEOUS DAILY
Refills: 0 | Status: DISCONTINUED | OUTPATIENT
Start: 2021-06-18 | End: 2021-06-24

## 2021-06-18 RX ORDER — CEFTRIAXONE 500 MG/1
1000 INJECTION, POWDER, FOR SOLUTION INTRAMUSCULAR; INTRAVENOUS EVERY 24 HOURS
Refills: 0 | Status: DISCONTINUED | OUTPATIENT
Start: 2021-06-18 | End: 2021-06-18

## 2021-06-18 RX ORDER — MEROPENEM 1 G/30ML
1000 INJECTION INTRAVENOUS EVERY 8 HOURS
Refills: 0 | Status: DISCONTINUED | OUTPATIENT
Start: 2021-06-18 | End: 2021-06-19

## 2021-06-18 RX ORDER — TAMSULOSIN HYDROCHLORIDE 0.4 MG/1
0.4 CAPSULE ORAL AT BEDTIME
Refills: 0 | Status: DISCONTINUED | OUTPATIENT
Start: 2021-06-18 | End: 2021-06-29

## 2021-06-18 RX ORDER — MORPHINE SULFATE 50 MG/1
4 CAPSULE, EXTENDED RELEASE ORAL EVERY 6 HOURS
Refills: 0 | Status: DISCONTINUED | OUTPATIENT
Start: 2021-06-18 | End: 2021-06-18

## 2021-06-18 RX ORDER — VANCOMYCIN HCL 1 G
VIAL (EA) INTRAVENOUS
Refills: 0 | Status: DISCONTINUED | OUTPATIENT
Start: 2021-06-18 | End: 2021-06-19

## 2021-06-18 RX ORDER — DOCUSATE SODIUM 100 MG
100 CAPSULE ORAL
Qty: 0 | Refills: 0 | DISCHARGE

## 2021-06-18 RX ORDER — CHLORHEXIDINE GLUCONATE 213 G/1000ML
1 SOLUTION TOPICAL
Refills: 0 | Status: DISCONTINUED | OUTPATIENT
Start: 2021-06-18 | End: 2021-06-29

## 2021-06-18 RX ORDER — POLYETHYLENE GLYCOL 3350 17 G/17G
17 POWDER, FOR SOLUTION ORAL
Refills: 0 | Status: DISCONTINUED | OUTPATIENT
Start: 2021-06-18 | End: 2021-06-22

## 2021-06-18 RX ORDER — NOREPINEPHRINE BITARTRATE/D5W 8 MG/250ML
0.05 PLASTIC BAG, INJECTION (ML) INTRAVENOUS
Qty: 8 | Refills: 0 | Status: DISCONTINUED | OUTPATIENT
Start: 2021-06-18 | End: 2021-06-20

## 2021-06-18 RX ORDER — VANCOMYCIN HCL 1 G
1000 VIAL (EA) INTRAVENOUS ONCE
Refills: 0 | Status: COMPLETED | OUTPATIENT
Start: 2021-06-18 | End: 2021-06-18

## 2021-06-18 RX ORDER — ALBUTEROL 90 UG/1
2 AEROSOL, METERED ORAL EVERY 6 HOURS
Refills: 0 | Status: DISCONTINUED | OUTPATIENT
Start: 2021-06-18 | End: 2021-06-29

## 2021-06-18 RX ORDER — SODIUM CHLORIDE 9 MG/ML
1000 INJECTION, SOLUTION INTRAVENOUS
Refills: 0 | Status: DISCONTINUED | OUTPATIENT
Start: 2021-06-18 | End: 2021-06-19

## 2021-06-18 RX ORDER — KETOROLAC TROMETHAMINE 30 MG/ML
15 SYRINGE (ML) INJECTION THREE TIMES A DAY
Refills: 0 | Status: DISCONTINUED | OUTPATIENT
Start: 2021-06-18 | End: 2021-06-18

## 2021-06-18 RX ORDER — CALCIUM CARBONATE 500(1250)
1 TABLET ORAL
Qty: 0 | Refills: 0 | DISCHARGE

## 2021-06-18 RX ORDER — MORPHINE SULFATE 50 MG/1
2 CAPSULE, EXTENDED RELEASE ORAL EVERY 6 HOURS
Refills: 0 | Status: DISCONTINUED | OUTPATIENT
Start: 2021-06-18 | End: 2021-06-18

## 2021-06-18 RX ORDER — VANCOMYCIN HCL 1 G
1000 VIAL (EA) INTRAVENOUS EVERY 12 HOURS
Refills: 0 | Status: DISCONTINUED | OUTPATIENT
Start: 2021-06-18 | End: 2021-06-19

## 2021-06-18 RX ORDER — MEROPENEM 1 G/30ML
1000 INJECTION INTRAVENOUS EVERY 12 HOURS
Refills: 0 | Status: DISCONTINUED | OUTPATIENT
Start: 2021-06-18 | End: 2021-06-18

## 2021-06-18 RX ORDER — BACLOFEN 100 %
1 POWDER (GRAM) MISCELLANEOUS
Qty: 0 | Refills: 0 | DISCHARGE

## 2021-06-18 RX ORDER — MAGNESIUM SULFATE 500 MG/ML
2 VIAL (ML) INJECTION
Refills: 0 | Status: COMPLETED | OUTPATIENT
Start: 2021-06-18 | End: 2021-06-19

## 2021-06-18 RX ORDER — LACTULOSE 10 G/15ML
10 SOLUTION ORAL DAILY
Refills: 0 | Status: DISCONTINUED | OUTPATIENT
Start: 2021-06-18 | End: 2021-06-20

## 2021-06-18 RX ORDER — ALBUTEROL 90 UG/1
2 AEROSOL, METERED ORAL
Qty: 0 | Refills: 0 | DISCHARGE

## 2021-06-18 RX ORDER — ONDANSETRON 8 MG/1
4 TABLET, FILM COATED ORAL ONCE
Refills: 0 | Status: COMPLETED | OUTPATIENT
Start: 2021-06-18 | End: 2021-06-18

## 2021-06-18 RX ORDER — SODIUM CHLORIDE 9 MG/ML
1000 INJECTION, SOLUTION INTRAVENOUS ONCE
Refills: 0 | Status: DISCONTINUED | OUTPATIENT
Start: 2021-06-18 | End: 2021-06-19

## 2021-06-18 RX ORDER — PHENOBARBITAL 60 MG
60 TABLET ORAL DAILY
Refills: 0 | Status: DISCONTINUED | OUTPATIENT
Start: 2021-06-18 | End: 2021-06-23

## 2021-06-18 RX ORDER — KETOROLAC TROMETHAMINE 30 MG/ML
15 SYRINGE (ML) INJECTION ONCE
Refills: 0 | Status: DISCONTINUED | OUTPATIENT
Start: 2021-06-18 | End: 2021-06-18

## 2021-06-18 RX ADMIN — ONDANSETRON 4 MILLIGRAM(S): 8 TABLET, FILM COATED ORAL at 05:00

## 2021-06-18 RX ADMIN — Medication 40 MILLIGRAM(S): at 21:27

## 2021-06-18 RX ADMIN — POLYETHYLENE GLYCOL 3350 17 GRAM(S): 17 POWDER, FOR SOLUTION ORAL at 06:04

## 2021-06-18 RX ADMIN — Medication 5.38 MICROGRAM(S)/KG/MIN: at 11:06

## 2021-06-18 RX ADMIN — Medication 30 MILLIGRAM(S): at 16:31

## 2021-06-18 RX ADMIN — Medication 60 MILLIGRAM(S): at 14:40

## 2021-06-18 RX ADMIN — Medication 250 MILLIGRAM(S): at 16:31

## 2021-06-18 RX ADMIN — Medication 15 MILLIGRAM(S): at 16:30

## 2021-06-18 RX ADMIN — CHLORHEXIDINE GLUCONATE 1 APPLICATION(S): 213 SOLUTION TOPICAL at 06:05

## 2021-06-18 RX ADMIN — Medication 25 GRAM(S): at 22:01

## 2021-06-18 RX ADMIN — MEROPENEM 100 MILLIGRAM(S): 1 INJECTION INTRAVENOUS at 11:02

## 2021-06-18 RX ADMIN — CEFTRIAXONE 100 MILLIGRAM(S): 500 INJECTION, POWDER, FOR SOLUTION INTRAMUSCULAR; INTRAVENOUS at 06:04

## 2021-06-18 RX ADMIN — TAMSULOSIN HYDROCHLORIDE 0.4 MILLIGRAM(S): 0.4 CAPSULE ORAL at 21:28

## 2021-06-18 RX ADMIN — Medication 15 MILLIGRAM(S): at 11:01

## 2021-06-18 NOTE — CONSULT NOTE ADULT - SUBJECTIVE AND OBJECTIVE BOX
INTERVENTIONAL RADIOLOGY CONSULT:     Procedure Requested: R PCN    HPI:  64 years old Male with PMHx of Cerebral Palsy, Seizure disorder, spastic paraplegia s/p PEG, BPH, Hx of Nephrolithiasis s/p pcnl x 2 with known incompetent bladder neck s/p SPT presents to ED with Right sided flank pain. Patient seen and examined at bedside, History obtained from group home chart. Patient reports feeling flank pain in the afternoon, radiating to RLQ, 10/10 with associated nausea/vomiting. Patient had Right-sided PCNL 5/20. Denies any fever, chills, SOB, CP, dysuria, hematuria. SPT in place draining cloudy yellow urine.     In ED, vitals were WNL, Labs significant for elevated lactate 2.2, UA + for bacteruria, LKE +.  CT A/P shows Delayed right nephrogram. Moderate right hydroureteronephrosis secondary to an obstructing right proximal ureteral stone. Stone size is difficult to measure due to extensive artifact. This stone appears to measure at least 6 mm craniocaudally (max ). He also has a right upper pole stone. Patient received 8mg of Morphine, IVF, Zofran, toradol and cefepime in ED patient reports pain is the same 10/10. To be admitted under medicine.  (18 Jun 2021 01:02)      PAST MEDICAL & SURGICAL HISTORY:  BPH (benign prostatic hyperplasia)    Cerebral palsy    Seizure  last seizure &gt;10 years ago    Osteoporosis    Spastic quadriplegia    Urinary calculi    Urinary retention    Asthma    S/P percutaneous endoscopic gastrostomy (PEG) tube placement    H/O cystoscopy    Suprapubic catheter        MEDICATIONS  (STANDING):  cefTRIAXone   IVPB 1000 milliGRAM(s) IV Intermittent every 24 hours  chlorhexidine 4% Liquid 1 Application(s) Topical <User Schedule>  enoxaparin Injectable 40 milliGRAM(s) SubCutaneous daily  lactated ringers. 1000 milliLiter(s) (100 mL/Hr) IV Continuous <Continuous>  lactulose Syrup 10 Gram(s) Oral daily  PHENobarbital 64.8 milliGRAM(s) Oral daily  polyethylene glycol 3350 17 Gram(s) Oral two times a day  tamsulosin 0.4 milliGRAM(s) Oral at bedtime    MEDICATIONS  (PRN):  ALBUTerol    90 MICROgram(s) HFA Inhaler 2 Puff(s) Inhalation every 6 hours PRN Bronchospasm  ketorolac   Injectable 15 milliGRAM(s) IV Push three times a day PRN Moderate Pain (4 - 6)  morphine  - Injectable 2 milliGRAM(s) IV Push every 6 hours PRN Severe Pain (7 - 10)      Allergies    No Known Allergies    Intolerances          FAMILY HISTORY:      Physical Exam:   Vital Signs Last 24 Hrs  T(C): 37.6 (18 Jun 2021 05:00), Max: 37.6 (18 Jun 2021 05:00)  T(F): 99.6 (18 Jun 2021 05:00), Max: 99.6 (18 Jun 2021 05:00)  HR: 89 (18 Jun 2021 05:05) (83 - 103)  BP: 116/56 (18 Jun 2021 05:00) (116/56 - 164/72)  BP(mean): --  RR: 18 (18 Jun 2021 05:00) (17 - 18)  SpO2: 94% (18 Jun 2021 05:05) (88% - 94%)       Labs:                         14.3   10.68 )-----------( 185      ( 17 Jun 2021 16:27 )             41.2     06-17    139  |  100  |  23<H>  ----------------------------<  135<H>  4.4   |  28  |  0.7    Ca    9.5      17 Jun 2021 16:27    TPro  7.4  /  Alb  4.4  /  TBili  0.4  /  DBili  <0.2  /  AST  18  /  ALT  10  /  AlkPhos  111  06-17          Radiology & Additional Studies:     Radiology imaging reviewed.       ASSESSMENT/ PLAN:   64 years old Male with PMHx of Cerebral Palsy, Seizure disorder, spastic paraplegia s/p PEG, BPH, Hx of Nephrolithiasis s/p pcnl x 2 with known incompetent bladder neck s/p SPT presents to ED with Right sided flank pain. CT A/P shows  Moderate right hydroureteronephrosis secondary to an obstructing right proximal ureteral stone. Urology request R PCN for access for future lithotripsy  - plan for Right PCN today  - draw CBC/CMP/coags prior to procedure  - keep NPO      Thank you for the courtesy of this consult, please call [phone] with any further questions.

## 2021-06-18 NOTE — PROCEDURE NOTE - NSPROCDETAILS_GEN_ALL_CORE
sterile technique, old cysto removed, new tube inserted
guidewire recovered/lumen(s) aspirated and flushed/sterile dressing applied/sterile technique, catheter placed/ultrasound guidance with use of sterile gel and probe cove

## 2021-06-18 NOTE — PROCEDURE NOTE - NSPOSTCAREGUIDE_GEN_A_CORE
Verbal/written post procedure instructions were given to patient/caregiver
discussed what was done with the nurse at the bedside

## 2021-06-18 NOTE — H&P ADULT - NSHPPHYSICALEXAM_GEN_ALL_CORE
PHYSICAL EXAM:  GENERAL: NAD, lying in bed comfortably  HEAD:  Atraumatic, Normocephalic  EYES: EOMI, PERRLA, conjunctiva and sclera clear  ENT: Moist mucous membranes  NECK: Supple, No JVD  CHEST/LUNG: Clear to auscultation bilaterally; No rales, rhonchi, wheezing, or rubs. Unlabored respirations  HEART: Regular rate and rhythm; No murmurs, rubs, or gallops  ABDOMEN: Bowel sounds present; Soft, Nontender, Nondistended. No hepatomegally  EXTREMITIES:  2+ Peripheral Pulses, brisk capillary refill. No clubbing, cyanosis, or edema  NERVOUS SYSTEM:  Alert & Oriented X3, speech clear. No deficits   MSK: FROM all 4 extremities, full and equal strength  SKIN: No rashes or lesions PHYSICAL EXAM:  GENERAL: NAD, lying in bed comfortably  HEAD:  Atraumatic, Normocephalic  EYES: EOMI, PERRLA, conjunctiva and sclera clear  ENT: Moist mucous membranes  NECK: Supple, No JVD  CHEST/LUNG: Clear to auscultation bilaterally; No rales, rhonchi, wheezing, or rubs. Unlabored respirations  HEART: Regular rate and rhythm; No murmurs, rubs, or gallops  ABDOMEN: right sided tender to palpation  EXTREMITIES:  2+ Peripheral Pulses, brisk capillary refill. No clubbing, cyanosis, or edema  NERVOUS SYSTEM:  Alert & Oriented X3, speech clear. No deficits   MSK: FROM all 4 extremities, full and equal strength  SKIN: No rashes or lesions

## 2021-06-18 NOTE — PROVIDER CONTACT NOTE (OTHER) - SITUATION
dr awada made aware patient received from ED vomiting. aid at beside states this is the second time that patient has vomited. patient is high risk for aspiration

## 2021-06-18 NOTE — CHART NOTE - NSCHARTNOTEFT_GEN_A_CORE
I was called by RN to assess the pt following R nephrostomy tube placement, as he was hypotensive 72/40,  upon arrival pt had 2 min of being not responding with eye following, followed with post ictal phase of 5 min, while he was desat with O2 sat 70s, tachycardia 140s and low BP,    pt mentation improved, able to follows commands, was put on NRB, 1 Liter bolus of LR given, levophed dose increase, pt started to improve, ABG showed lactate 4 and metabolic mixed acidosis, PH 7.27/53/hco3 24  CXR no def consolidation, pt was put on BiPAP, EKG showed ST depression in AVR and V4-V5 and with     on arrival to step down unit, EKG repeated sinus Rhythm ,   will place TLC and send stat lactate and cardiac enzymes,   currently improved on BiPAP, phenobarbital given. pt sister called and updated, pt is full code I was called by RN at 13:30 to assess the pt following R nephrostomy tube placement, as he was hypotensive 72/40,  upon arrival pt had 2 min of being not responding with eye following, followed with post ictal phase of 5 min, while he was desat with O2 sat 70s, tachycardia 140s and low BP,    pt mentation improved, able to follows commands, was put on NRB, 1 Liter bolus of LR given, levophed dose increase, pt started to improve, ABG showed lactate 4 and metabolic mixed acidosis, PH 7.27/53/hco3 24  CXR no def consolidation, pt was put on BiPAP, EKG showed ST depression in AVR and V4-V5 and with     on arrival to step down unit, EKG repeated sinus Rhythm ,   will place TLC and send stat lactate and cardiac enzymes,   currently improved on BiPAP, phenobarbital given. pt sister called and updated, pt is full code

## 2021-06-18 NOTE — PROCEDURE NOTE - NSURITECHNIQUE_GU_A_CORE
Proper hand hygiene was performed/Sterile gloves were worn for the duration of the procedure/A sterile drape was used to cover all adjacent areas/The site was cleaned with soap/water and sterile solution (betadine)/The catheter was appropriately lubricated/The catheter was secured with a securement device (e.g. StatLock)/The urinary drainage system is closed at the end of the procedure/The collection bag is below the level of the patient and urinary bladder

## 2021-06-18 NOTE — CONSULT NOTE ADULT - SUBJECTIVE AND OBJECTIVE BOX
Patient is a 65y old  Male who presents with a chief complaint of Right Nephrolithiasis (2021 12:43)      HPI:  64 years old Male with PMHx of Cerebral Palsy, Seizure disorder, spastic paraplegia s/p PEG, BPH, Hx of Nephrolithiasis s/p pcnl x 2 with known incompetent bladder neck s/p SPT presents to ED with Right sided flank pain. Patient seen and examined at bedside, History obtained from group home chart. Patient reports feeling flank pain in the afternoon, radiating to RLQ, 10/10 with associated nausea/vomiting. Patient had Right-sided PCNL . Denies any fever, chills, SOB, CP, dysuria, hematuria. SPT in place draining cloudy yellow urine.     In ED, vitals were WNL, Labs significant for elevated lactate 2.2, UA + for bacteruria, LKE +.  CT A/P shows Delayed right nephrogram. Moderate right hydroureteronephrosis secondary to an obstructing right proximal ureteral stone. Stone size is difficult to measure due to extensive artifact. This stone appears to measure at least 6 mm craniocaudally (max ). He also has a right upper pole stone. Patient received 8mg of Morphine, IVF, Zofran, toradol and cefepime in ED patient reports pain is the same 10/10. To be admitted under medicine.  (2021 01:02)    ICU called for hypotension/ fever of 103/requiring 2 L of IV fluid bolus of LR and vasopressors use levo at 0.11.    Patient is following commands, responds appropriately, started on yovanny and vanc, s/p PCN      PAST MEDICAL & SURGICAL HISTORY:  BPH (benign prostatic hyperplasia)    Cerebral palsy    Seizure  last seizure &gt;10 years ago    Osteoporosis    Spastic quadriplegia    Urinary calculi    Urinary retention    Asthma    S/P percutaneous endoscopic gastrostomy (PEG) tube placement    H/O cystoscopy    Suprapubic catheter        SOCIAL HX:   Smoking  -ve                       ETOH         -ve                   Other  -ve    FAMILY HISTORY:  :  No known cardiovacular family hisotry     ROS:  See HPI     Allergies    No Known Allergies    Intolerances          PHYSICAL EXAM    ICU Vital Signs Last 24 Hrs  T(C): 37.6 (2021 05:00), Max: 37.6 (2021 05:00)  T(F): 99.6 (2021 05:00), Max: 99.6 (2021 05:00)  HR: 89 (2021 05:05) (83 - 103)  BP: 116/56 (2021 05:00) (116/56 - 164/72)  RR: 18 (2021 05:00) (17 - 18)  SpO2: 94% (2021 05:05) (88% - 94%)      General: In NAD   HEENT:  MONTEZ              Lungs: Bilateral BS  Cardiovascular: Regular  Gastrointestinal:PEG tube  Musculoskeletal: spastic paralysis  Skin: Warm.  Intact  Neurological: follows commands, responds appropriately to question        LABS:                          12.8   6.74  )-----------( 128      ( 2021 09:29 )             37.2                                               06-18    141  |  105  |  25<H>  ----------------------------<  108<H>  4.2   |  24  |  1.0    Ca    8.7      2021 09:29  Mg     1.6     18    TPro  6.1  /  Alb  3.4<L>  /  TBili  0.4  /  DBili  x   /  AST  16  /  ALT  9   /  AlkPhos  111  -18      PT/INR - ( 2021 09:29 )   PT: 14.90 sec;   INR: 1.30 ratio         PTT - ( 2021 09:29 )  PTT:30.2 sec                                       Urinalysis Basic - ( 2021 16:27 )    Color: Orange / Appearance: Turbid / S.014 / pH: x  Gluc: x / Ketone: Negative  / Bili: Negative / Urobili: <2 mg/dL   Blood: x / Protein: 100 mg/dL / Nitrite: Negative   Leuk Esterase: Moderate / RBC: 0 /HPF / WBC 0 /HPF   Sq Epi: x / Non Sq Epi: 0 /HPF / Bacteria: Negative        CARDIAC MARKERS ( 2021 09:29 )  x     / 0.01 ng/mL / x     / x     / x                                                LIVER FUNCTIONS - ( 2021 09:29 )  Alb: 3.4 g/dL / Pro: 6.1 g/dL / ALK PHOS: 111 U/L / ALT: 9 U/L / AST: 16 U/L / GGT: x                                                                                                                                       X-Rays                                                                                     ECHO    MEDICATIONS  (STANDING):  bisacodyl Suppository 10 milliGRAM(s) Rectal daily  chlorhexidine 4% Liquid 1 Application(s) Topical <User Schedule>  enoxaparin Injectable 40 milliGRAM(s) SubCutaneous daily  lactated ringers. 1000 milliLiter(s) (100 mL/Hr) IV Continuous <Continuous>  lactated ringers. 1000 milliLiter(s) (1000 mL/Hr) IV Continuous <Continuous>  lactated ringers. 1000 milliLiter(s) (2000 mL/Hr) IV Continuous <Continuous>  lactulose Syrup 10 Gram(s) Oral daily  meropenem  IVPB 1000 milliGRAM(s) IV Intermittent every 8 hours  norepinephrine Infusion 0.05 MICROgram(s)/kG/Min (5.38 mL/Hr) IV Continuous <Continuous>  PHENobarbital Injectable 60 milliGRAM(s) IV Push daily  polyethylene glycol 3350 17 Gram(s) Oral two times a day  tamsulosin 0.4 milliGRAM(s) Oral at bedtime  vancomycin  IVPB      vancomycin  IVPB 1000 milliGRAM(s) IV Intermittent every 12 hours  vancomycin  IVPB 1000 milliGRAM(s) IV Intermittent once    MEDICATIONS  (PRN):  ALBUTerol    90 MICROgram(s) HFA Inhaler 2 Puff(s) Inhalation every 6 hours PRN Bronchospasm  morphine  - Injectable 4 milliGRAM(s) IV Push every 6 hours PRN Moderate Pain (4 - 6)

## 2021-06-18 NOTE — PROCEDURE NOTE - ADDITIONAL PROCEDURE DETAILS
Panel Management Review      Patient has the following on his problem list:     Depression / Dysthymia review    Measure:  Needs PHQ-9 score of 4 or less during index window.  Administer PHQ-9 and if score is 5 or more, send encounter to provider for next steps.    5 - 7 month window range: last seen 1/31/20 - was supposed to do 1 mnth f/u    PHQ-9 SCORE 11/11/2019 1/10/2020 1/31/2020   PHQ-9 Total Score MyChart - - 7 (Mild depression)   PHQ-9 Total Score 7 6 7       If PHQ-9 recheck is 5 or more, route to provider for next steps.    Patient is due for:  PHQ9 and JOSE ANGEL      Composite cancer screening  Chart review shows that this patient is due/due soon for the following None  Summary:    Patient is due/failing the following:   JOSE ANGEL and PHQ9    Action needed:   Patient needs to do PHQ9 & JOSE ANGEL.    Type of outreach:    Sent [employer] message.    Questions for provider review:    None                                                                          Hilaria Villalpando CMA    Chart routed to Care Team .   Under sterile technique,  26Fr SP tube catheter exchanged without difficulty. + return of pink tinged urine. alatorre balloon inflated with 10cc of sterile water. Irrigated bladder with 50cc of sterile water, minimal sediment evacuated. Alatorre draining pink to clear yellow urine. alatorre catheter secured onto pt's right leg. pt tolerated procedure well. Under sterile technique,  26Fr SP tube catheter exchanged without difficulty. + return of pink tinged urine. alatorre balloon inflated with 10cc of sterile water. Irrigated bladder with 50cc of sterile water, minimal sediment evacuated. Alatorre draining pink to clear yellow urine. alatorre catheter secured onto pt's right leg. pt tolerated procedure well.    Note carefully placed catheter in ONLY to the extent the prior one was in

## 2021-06-18 NOTE — CHART NOTE - NSCHARTNOTEFT_GEN_A_CORE
Pt not septic on admission, has no wbx or fever, but given Hx of proteus ESBL and +UA with PH > 9 will give Meropenem empirically,  pt has seizure disorder, at risk of breakthrough seizure given acute illness and meropenem, neuro check Q4h, seizure precaution, repeat lactate Pt not septic on admission, has no wbx or fever, but given Hx of proteus ESBL and +UA with PH > 9 will give Meropenem empirically,  pt has seizure disorder, at risk of breakthrough seizure given acute illness and meropenem, neuro check Q4h, seizure precaution, repeat lactate    #I was called BY IR team to evaluate the pt as he has fever 103, BP dropped to 75/45,  Pt apparently in uroseptic shock, his mentation is intact, airway is protected, he needs urgent Right PCN to alleviate the renal obstruction, will give 2 Liter of LR boluses and give low dose levo to optmize him for IR procedure,  Meropenem given,   BP now 90/55,   case discussed with ICU fellow, pt will be disposed to SDU after Right PCN

## 2021-06-18 NOTE — CHART NOTE - NSCHARTNOTEFT_GEN_A_CORE
64 years old Male with PMHx of Cerebral Palsy, Seizure disorder, spastic paraplegia s/p PEG, BPH, Hx of Nephrolithiasis s/p pcnl x 2 with known incompetent bladder neck s/p SPT presents to ED with Right sided flank pain.   Patient seen and examined at bedside, History obtained from group home chart. Patient reports feeling flank pain in the afternoon, radiating to RLQ, 10/10 with associated nausea/vomiting. Patient had Right-sided PCNL 5/20.   Denies any fever, chills, SOB, CP, dysuria, hematuria. SPT in place draining cloudy yellow urine.     in ER; VS stable, elevated lactate 2.2, UA + for bacteruria, CT abd showed Delayed right nephrogram. Moderate right hydroureteronephrosis secondary to an obstructing right proximal ureteral stone.  as Patient has a known incompetent bladder neck; therefore, Uro not definitely able to perform cystoscopy/ureteroscopy from below to place stents.   IR consulted for He may R PCN placement to decompress bladder and for percutaneous access.              this morning while pt in IR, he decompensated and developed urosepsis, fever 103, , BP 75//40  - 2 Liter of boluses and levophed   - Meropenem and vancomycin  - urgent Right PCN to drain the renal obstruction    #plans:    #obstructive urosepsis  - Right PCN urgently for renal drainage  - c/w Ava, has Hx of proteus ESBL, also add Vancomycin, ID eval  - LR 100cc/hr  - low dose Levo  - trend lactate, f/u blood and Ucx  - step down unit monitoring    #HO seizure disorder - c/w phenobarbital, make it IV for now,    Constipation, stool burden in rectum - aggressive bowel regimen - lactulose, miralax and enema   HO cerebral palsy / spastic paraplegia s/p PEG tube - c/w bowel regimen    HO BPH - c/w tamsulosin   HO Asthma - c/w inhalers    # Diet: NPO for now. tube feeding once stable   # DVT Prophylaxis: Lovenox  # Activity: IAT  # IV Fluids: LR @ 100cc  # Dispo: Acute  # Code Status: Fullcode

## 2021-06-18 NOTE — H&P ADULT - NSHPLABSRESULTS_GEN_ALL_CORE
( @ 16:27)                      14.3  10.68 )-----------( 185                 41.2    Neutrophils = 9.93 (93.0%)  Lymphocytes = 0.18 (1.7%)  Eosinophils = 0.00 (0.0%)  Basophils = 0.10 (0.9%)  Monocytes = 0.47 (4.4%)  Bands = --%        139  |  100  |  23<H>  ----------------------------<  135<H>  4.4   |  28  |  0.7    Ca    9.5      2021 16:27    TPro  7.4  /  Alb  4.4  /  TBili  0.4  /  DBili  <0.2  /  AST  18  /  ALT  10  /  AlkPhos  111      Urinalysis Basic - ( 2021 16:27 )    < from: CT Abdomen and Pelvis w/ IV Cont (21 @ 18:58) >    IMPRESSION:    1.  Obstructing proximal right ureteral stone with moderate hydroureteronephrosis.  2.  Bladder wall thickening. Correlate with urinalysis.  3.  See body of the report for additional findings.    < end of copied text >        Color: Orange / Appearance: Turbid / S.014 / pH: x  Gluc: x / Ketone: Negative  / Bili: Negative / Urobili: <2 mg/dL   Blood: x / Protein: 100 mg/dL / Nitrite: Negative   Leuk Esterase: Moderate / RBC: 0 /HPF / WBC 0 /HPF   Sq Epi: x / Non Sq Epi: 0 /HPF / Bacteria: Negative

## 2021-06-18 NOTE — PROGRESS NOTE ADULT - ASSESSMENT
65y Male with pmh of cerebral Palsy, seizure disorder, spastic paraplegia, BPH, Hx of Nephrolithiasis s/p PCNL x 2, with known incompetent bladder neck s/p SPT admitted with moderate right hydro 2/2 obstructing right proximal ureteral stone - s/p right PCN placement     Plan:  ·	f/u with urine cultures taken intraoperative during right PCN placement   ·	continue to monitor vitals   ·	continue to monitor right PCN output   ·	26Fr SP tube exchanged at bedside with Dr. Cooper   ·	Cr stable, 1.0 continue to trend   ·	continue IV abx as per ID recommendations  ·	continue current medical management    ·	case discussed with attending  65y Male with pmh of cerebral Palsy, seizure disorder, spastic paraplegia, BPH, Hx of Nephrolithiasis s/p PCNL x 2, with known incompetent bladder neck s/p SPT admitted with moderate right hydro 2/2 obstructing right proximal ureteral stone - s/p right PCN placement with sepsis and hypotension    Plan:  ·	f/u with urine cultures taken intraoperative during right PCN placement   ·	continue to monitor vitals   ·	continue to monitor right PCN output   ·	26Fr SP tube exchanged at bedside with Dr. Cooper , see that night  ·	Cr stable, 1.0 continue to trend   ·	continue IV abx as per ID recommendations  ·	continue current medical management    ·	case discussed with attending

## 2021-06-18 NOTE — CONSULT NOTE ADULT - ASSESSMENT
IMPRESSION:    Septic shock 2ry to obtructive pyelonephritis  Chronic suprapubic Edouard    PLAN:    CNS: avoid CNS depressants    HEENT:  Oral care    PULMONARY:  HOB @ 45 degrees, aspiration precautions. Check CXR    CARDIOVASCULAR: Continue LR at 100cc/hr, recheck lactate, bolus 500cc over 30 mins wean levo    GI: GI prophylaxis                                          Feeding PEG feeds    RENAL: F/u urine output, F/u IR, urology.    INFECTIOUS DISEASE: yovanny/vanc for now, ID eval.  F/u culture and sens, trend procal.     HEMATOLOGICAL:  DVT prophylaxis.    ENDOCRINE:  Follow up FS.  Insulin protocol if needed.    MUSCULOSKELETAL: HOB elevation    CODE STATUS: FULL CODE    DISPOSITION: Pt requires continued monitoring in the SDU    Attending attestation to follow

## 2021-06-18 NOTE — H&P ADULT - ASSESSMENT
64 years old Male with PMHx of Cerebral Palsy, Seizure disorder, spastic paraplegia, BPH, Hx of Nephrolithiasis s/p pcnl x 2 with known incompetent bladder neck s/p SPT presents to ED with Right sided flank pain.    Impression:  Nephrolithiasis  HO cerebral palsy / spastic paraplegia s/p PEG tube  HO seizure disorder  HO BPH    # Nephrolithiasis  - CT A/P shows Delayed right nephrogram. Moderate right hydroureteronephrosis secondary to an obstructing right proximal ureteral stone. Stone size is difficult to measure due to extensive artifact. This stone appears to measure at least 6 mm craniocaudally (max ). He also has a right upper pole stone. Patient received 8mg of Morphine, IVF, Zofran, toradol and cefepime in ED patient reports pain is the same 10/10. To be admitted under medicine.  - UA + for bacteruria and LKE+ - has SPC   - IVF - LR @ 100cc  - pain control - ketorolac and IV morphine  - tamsulosin   - ABx:   - IR consult for PCN   - get CMP, uric acid      HO cerebral palsy / spastic paraplegia s/p PEG tube -   HO seizure disorder  HO BPH - c/w tamsulosin     # Diet: NPO for now.   # DVT Prophylaxis: Lovenox  # GI Prophylaxis:   # Activity: IAT  # IV Fluids:  # Dispo: Acute  # Code Status: Fullcode  # CHG wash 64 years old Male with PMHx of Cerebral Palsy, Seizure disorder, spastic paraplegia, BPH, Hx of Nephrolithiasis s/p pcnl x 2 with known incompetent bladder neck s/p SPT presents to ED with Right sided flank pain.    Impression:  Nephrolithiasis  HO cerebral palsy / spastic paraplegia s/p PEG tube  HO seizure disorder  HO BPH    # Nephrolithiasis  - CT A/P shows Delayed right nephrogram. Moderate right hydroureteronephrosis secondary to an obstructing right proximal ureteral stone. Stone size is difficult to measure due to extensive artifact. This stone appears to measure at least 6 mm craniocaudally (max ). He also has a right upper pole stone. Patient received 8mg of Morphine, IVF, Zofran, toradol and cefepime in ED patient reports pain is the same 10/10. To be admitted under medicine.  - UA + for bacteruria and LKE+ - has SPC   - IVF - LR @ 100cc  - pain control - ketorolac and IV morphine  - tamsulosin   - ABx: ceftriaxone   - IR consult for PCN   - get CMP, uric acid, KUB    Constipation, stool burden in rectum - aggressive bowel regimen - lactulose, miralax and enema - get KUB  HO cerebral palsy / spastic paraplegia s/p PEG tube - c/w bowel regimen  HO seizure disorder - c/w phenobarbital  HO BPH - c/w tamsulosin   HO Asthma - c/w inhalers    # Diet: NPO for now.   # DVT Prophylaxis: Lovenox  # Activity: IAT  # IV Fluids: LR @ 100cc  # Dispo: Acute  # Code Status: Fullcode     * Please call IR in am for procedure 64 years old Male with PMHx of Cerebral Palsy, Seizure disorder, spastic paraplegia, BPH, Hx of Nephrolithiasis s/p pcnl x 2 with known incompetent bladder neck s/p SPT presents to ED with Right sided flank pain.    Impression:  Nephrolithiasis  HO cerebral palsy / spastic paraplegia s/p PEG tube  HO seizure disorder  HO BPH    # Nephrolithiasis  - CT A/P shows Delayed right nephrogram. Moderate right hydroureteronephrosis secondary to an obstructing right proximal ureteral stone. Stone size is difficult to measure due to extensive artifact. This stone appears to measure at least 6 mm craniocaudally (max ). He also has a right upper pole stone. Patient received 8mg of Morphine, IVF, Zofran, toradol and cefepime in ED patient reports pain is the same 10/10. To be admitted under medicine.  - UA + for bacteruria and LKE+ - has SPC   - IVF - LR @ 100cc  - pain control - ketorolac and IV morphine  - tamsulosin   - ABx: ceftriaxone   - IR consult for PCN   - get CMP, uric acid, KUB    Constipation, stool burden in rectum - aggressive bowel regimen - lactulose, miralax and enema - get KUB  HO cerebral palsy / spastic paraplegia s/p PEG tube - c/w bowel regimen  HO seizure disorder - c/w phenobarbital  HO BPH - c/w tamsulosin   HO Asthma - c/w inhalers    # Diet: NPO for now.   # DVT Prophylaxis: Lovenox  # Activity: IAT  # IV Fluids: LR @ 100cc  # Dispo: Acute  # Code Status: Fullcode

## 2021-06-18 NOTE — PROCEDURE NOTE - PROCEDURE DATE TIME, MLM
Continue dosing schedule 10mg Sun, Wed, Sat and 15mg the rest of the week. Follow up for recheck in 3 weeks, sooner with any concerns.   INR above target. Obtained by [facility] nurse. No bleeding problems. No med changes. Discussed vit K foods and diet consistency, ETOH, red juices, OTC meds. Patient denies diet changes. Reviewed Anticoag Flowsheet. Patient ambulated with steady gait and without assistive device. Patient verbalizes understanding. Onsite provider is Dr Chamorro.  Anticoagulation Summary as of 5/2/2017     INR goal 2.0-3.0   Today's INR 3.3!   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 Takes pills in the morning. 2 to 3 beers/week? Is using pill tray now. Vit K foods. PE on 4/2016, 11/2012, 6/2011.      Anticoagulation Care Providers     Provider Role Specialty Phone number    Romel Chamorro DO Referring Family [facility] [phone]             18-Jun-2021 18:06

## 2021-06-18 NOTE — PROGRESS NOTE ADULT - SUBJECTIVE AND OBJECTIVE BOX
Progress Note    Patient is a 65y Male with pmh of cerebral Palsy, seizure disorder, spastic paraplegia, BPH, Hx of Nephrolithiasis s/p PCNL x 2, with known incompetent bladder neck s/p SPT admitted with moderate right hydro 2/2 obstructing right proximal ureteral stone. Pt was planned for right PCN placement by IR today. Prior to IR procedure pt developed a fever of 103F and became hypotensive of 75/45. Pt received 2L bolus and started on lose levophed to optimize pt for IR procedure. During right PCN placement, pt decompensated intraoperatively with dropping O2 saturation, however right lower pole renal calyceal was accessed immediately. IR aspirated 16 cc malodorous, serosanguinous fluid from right kidney and was sent for urinalaysis and cultures. Pt seen and examined after upgraded to the step down unit. Pt able to answer yes and no questions, currently on bipap.        [ x ] a 10 point review of systems was negative except where noted            Vital signs  T(C): , Max: 37.6 (06-18-21 @ 05:00)  HR: 94 (06-18-21 @ 15:55)  BP: 116/56 (06-18-21 @ 05:00)  SpO2: 97% (06-18-21 @ 15:55)    Physical Exam  Gen: currently on bipap  HEENT: NC/AT  Back: + Right upper CVA tenderness with palpation, + right PCN draining blood tinged urine   Respiratory: unlabored breathing   Abd: +moderate RUQ tenderness, Soft, NT/ND  no organomegally  no hernia to my exam tense and distended but without guarding or rebound  :  26 Fr catheter SPT in place draining cloudy yellow urine with sediment   Extremities: no edema  Skin: No rashes    Labs                        12.8   6.74  )-----------( 128      ( 18 Jun 2021 09:29 )             37.2     18 Jun 2021 09:29    141    |  105    |  25     ----------------------------<  108    4.2     |  24     |  1.0      Ca    8.7        18 Jun 2021 09:29  Mg     1.6       18 Jun 2021 09:29                  Progress Note    Patient is a 65y Male with pmh of cerebral Palsy, seizure disorder, spastic paraplegia, BPH, Hx of Nephrolithiasis s/p PCNL x 2, with known incompetent bladder neck s/p SPT admitted with moderate right hydro 2/2 obstructing right proximal ureteral stone. Pt was planned for right PCN placement by IR today. Prior to IR procedure pt developed a fever of 103F and became hypotensive of 75/45. Pt received 2L bolus and started on lose levophed to optimize pt for IR procedure. During right PCN placement, pt decompensated intraoperatively with dropping O2 saturation, however right lower pole renal calyceal was accessed immediately. IR aspirated 16 cc malodorous, serosanguinous fluid from right kidney and was sent for urinalaysis and cultures. Pt seen and examined after upgraded to the step down unit. Pt able to answer yes and no questions, currently on bipap.        [ x ] a 10 point review of systems was negative except where noted            Vital signs  T(C): , Max: 37.6 (06-18-21 @ 05:00)  HR: 94 (06-18-21 @ 15:55)  BP: 116/56 (06-18-21 @ 05:00)  SpO2: 97% (06-18-21 @ 15:55)    Physical Exam  Gen: currently on bipap, contracted   HEENT: NC/AT  Back: + mild right upper CVA tenderness with palpation, + right PCN draining blood tinged urine   Respiratory: + BiPAP noted, satting high 90s     Abd: nontender, mild distended,   : + 26 Fr catheter SPT in place with minimal cloudy urine draining + sediment noted as well    Extremities: no edema  Skin: Non diaphoretic     attempted to irrigated SP tube catheter at bedside, however difficult to aspirate due to sediment clogging Edouard catheter   Under sterile technique,  26Fr SP tube catheter exchanged without difficulty. + return of pink tinged urine. Irrigated bladder with 50cc of sterile water, minimal sediment evacuated. Edouard draining pink to clear yellow urine.     Labs                        12.8   6.74  )-----------( 128      ( 18 Jun 2021 09:29 )             37.2     18 Jun 2021 09:29    141    |  105    |  25     ----------------------------<  108    4.2     |  24     |  1.0      Ca    8.7        18 Jun 2021 09:29  Mg     1.6       18 Jun 2021 09:29                Progress Note    Patient is a 65y Male with pmh of cerebral Palsy, seizure disorder, spastic paraplegia, BPH, Hx of Nephrolithiasis s/p PCNL x 2, with known incompetent bladder neck s/p SPT admitted with moderate right hydro 2/2 obstructing right proximal ureteral stone, with another stone in the kidney. Pt was planned for right PCNU placement by IR today. Prior to IR procedure pt developed a fever of 103F and became hypotensive of 75/45. Pt received 2L bolus and started on low dose levophed to optimize pt for IR procedure. During right PCN placement, pt decompensated intraoperatively with dropping O2 saturation, therefore right lower pole renal calyceal was accessed immediately instead of tem mid pole which has the stone in it . IR aspirated 16 cc malodorous, serosanguinous fluid from right kidney and an aliquot was sent for urinalaysis and cultures. Pt seen and examined after upgraded to the step down unit. Pt able to answer yes and no questions, currently on bipap.        [ x ] a 10 point review of systems was negative except where noted            Vital signs  T(C): , Max: 37.6 (06-18-21 @ 05:00)  HR: 94 (06-18-21 @ 15:55)  BP: 116/56 (06-18-21 @ 05:00)  SpO2: 97% (06-18-21 @ 15:55)    Physical Exam  Gen: currently on bipap, contracted   HEENT: NC/AT  Back: + mild right upper CVA tenderness with palpation, + right PCN draining blood tinged urine   Respiratory: + BiPAP noted, 'satting' high 90s     Abd: nontender, mild distended,   : + 26 Fr catheter SPT in place with minimal cloudy urine draining + sediment noted as well  prior attempts to irrigate unsuccesful  Extremities: no edema  Skin: Non diaphoretic     attempted to irrigated SP tube catheter at bedside, however difficult to aspirate due to sediment clogging Edouard catheter   Under sterile technique,  26Fr SP tube catheter exchanged without difficulty. + return of pink tinged urine. Irrigated bladder with 50cc of sterile water, minimal sediment evacuated. Edouard draining pink to clear yellow urine. Daisy mendoza that note listed seperately    Labs                        12.8   6.74  )-----------( 128      ( 18 Jun 2021 09:29 )             37.2     18 Jun 2021 09:29    141    |  105    |  25     ----------------------------<  108    4.2     |  24     |  1.0      Ca    8.7        18 Jun 2021 09:29  Mg     1.6       18 Jun 2021 09:29

## 2021-06-18 NOTE — PROCEDURE NOTE - NSINDICATIONS_GEN_A_CORE
SPT exchange/other SPT exchangein pt with incompetent bladder neck/presence of stage 3 or 4 decubitus ulcer/urinry obstruction or retention/other

## 2021-06-18 NOTE — H&P ADULT - HISTORY OF PRESENT ILLNESS
64 years old Male with PMHx of Cerebral Palsy, Seizure disorder, spastic paraplegia, BPH, Hx of Nephrolithiasis s/p pcnl x 2 with known incompetent bladder neck s/p SPT presents to ED with Right sided flank pain. Patient seen and examined at bedside, Aide from Group Home. Patient reports feeling flank pain in the afternoon, radiating to RLQ, 10/10 with associated nausea/vomiting. Patient well known to Urology, patient had Right-sided PCNL 5/20. Denies any fever, chills, SOB, CP, dysuria, hematuria. SPT in place draining cloudy yellow urine.     In ED, vitals were WNL, Labs significant for elevated lactate 2.2, UA + for bacteruria, LKE +.  CT A/P shows Delayed right nephrogram. Moderate right hydroureteronephrosis secondary to an obstructing right proximal ureteral stone. Stone size is difficult to measure due to extensive artifact. This stone appears to measure at least 6 mm craniocaudally (max ). He also has a right upper pole stone. Patient received 8mg of Morphine, IVF, Zofran, toradol and cefepime in ED patient reports pain is the same 10/10. To be admitted under medicine.  64 years old Male with PMHx of Cerebral Palsy, Seizure disorder, spastic paraplegia s/p PEG, BPH, Hx of Nephrolithiasis s/p pcnl x 2 with known incompetent bladder neck s/p SPT presents to ED with Right sided flank pain. Patient seen and examined at bedside, History obtained from group home chart. Patient reports feeling flank pain in the afternoon, radiating to RLQ, 10/10 with associated nausea/vomiting. Patient had Right-sided PCNL 5/20. Denies any fever, chills, SOB, CP, dysuria, hematuria. SPT in place draining cloudy yellow urine.     In ED, vitals were WNL, Labs significant for elevated lactate 2.2, UA + for bacteruria, LKE +.  CT A/P shows Delayed right nephrogram. Moderate right hydroureteronephrosis secondary to an obstructing right proximal ureteral stone. Stone size is difficult to measure due to extensive artifact. This stone appears to measure at least 6 mm craniocaudally (max ). He also has a right upper pole stone. Patient received 8mg of Morphine, IVF, Zofran, toradol and cefepime in ED patient reports pain is the same 10/10. To be admitted under medicine.

## 2021-06-18 NOTE — PROGRESS NOTE ADULT - SUBJECTIVE AND OBJECTIVE BOX
INTERVENTIONAL RADIOLOGY BRIEF-OPERATIVE NOTE    Procedure:  Urgent percutaneous right nephrostomy     Pre-Op Diagnosis:  Urosepsis secondary to obstructing proximal right ureteral calculus    Post-Op Diagnosis:  Same    Attending:   Dr. Plascencia  Resident:   None    Anesthesia (type):  [ ] General Anesthesia  [X] Sedation-- Versed, 1 mg and Fentanyl, 12.5 mcg, iv  [ ] Spinal Anesthesia  [X] Local/Regional-- 1$ Lidocaine, SQ, right flank, 7 cc    Contrast:  Visipaque, 7 cc to right renal collecting system, drained    Blood Loss:  5 cc    Condition:   [ ] Critical  [ ] Serious  [ ] Fair   [X] Good    Findings/Follow up Plan of Care:  Patient originally planned for targeted right nephrostomy access in preparation for right nephroureterolithotomy, but he became hypotensive and tachycardic in the IR holding area (to 70's/40's).  Medicine senior resident responded immediately, and patient optimized in IR with additional fluids / vasopressor support.      Percutaneous right nephrostomy performed asap.  Although targeted access was planned and initiated, the patient decompensated intra-procedurally with drop in oxygen saturation to as low as 91.  He was immediately stabilized.      Alternative lower pole, right renal calyceal access was immediately obtained, followed by placement of 8-Libyan nephrostomy catheter to the right renal pelvis. Right kidney urine specimen was sent for UA and cultures.      Post-procedure, serosanguinous urine draining externally to gravity.  Patient tolerated the remainder of the procedure well, otherwise.    Specimens Removed:  16 cc malodorous, serosanguinous fluid from right kidney    Implants:  None    Complications:  None immediate    Disposition:  Back to Unit.  Maintain new 8-Libyan right nephrostomy catheter to external, gravity drainage and monitor output q shift.  Ff/U right kidney UA and cultures.      Please call Interventional Radiology [phone] with any questions, concerns, or issues.

## 2021-06-19 LAB
ALBUMIN SERPL ELPH-MCNC: 2.8 G/DL — LOW (ref 3.5–5.2)
ALP SERPL-CCNC: 86 U/L — SIGNIFICANT CHANGE UP (ref 30–115)
ALT FLD-CCNC: 14 U/L — SIGNIFICANT CHANGE UP (ref 0–41)
ANION GAP SERPL CALC-SCNC: 12 MMOL/L — SIGNIFICANT CHANGE UP (ref 7–14)
AST SERPL-CCNC: 24 U/L — SIGNIFICANT CHANGE UP (ref 0–41)
BASOPHILS # BLD AUTO: 0.02 K/UL — SIGNIFICANT CHANGE UP (ref 0–0.2)
BASOPHILS NFR BLD AUTO: 0.1 % — SIGNIFICANT CHANGE UP (ref 0–1)
BILIRUB SERPL-MCNC: 0.3 MG/DL — SIGNIFICANT CHANGE UP (ref 0.2–1.2)
BUN SERPL-MCNC: 28 MG/DL — HIGH (ref 10–20)
CALCIUM SERPL-MCNC: 7.9 MG/DL — LOW (ref 8.5–10.1)
CHLORIDE SERPL-SCNC: 104 MMOL/L — SIGNIFICANT CHANGE UP (ref 98–110)
CO2 SERPL-SCNC: 22 MMOL/L — SIGNIFICANT CHANGE UP (ref 17–32)
COVID-19 SPIKE DOMAIN AB INTERP: POSITIVE
COVID-19 SPIKE DOMAIN ANTIBODY RESULT: >250 U/ML — HIGH
CREAT SERPL-MCNC: 0.8 MG/DL — SIGNIFICANT CHANGE UP (ref 0.7–1.5)
CRP SERPL-MCNC: 120 MG/L — HIGH
E FAECALIS DNA BLD POS QL NAA+NON-PROBE: SIGNIFICANT CHANGE UP
EOSINOPHIL # BLD AUTO: 0 K/UL — SIGNIFICANT CHANGE UP (ref 0–0.7)
EOSINOPHIL NFR BLD AUTO: 0 % — SIGNIFICANT CHANGE UP (ref 0–8)
FERRITIN SERPL-MCNC: 107 NG/ML — SIGNIFICANT CHANGE UP (ref 30–400)
GLUCOSE SERPL-MCNC: 165 MG/DL — HIGH (ref 70–99)
GRAM STN FLD: SIGNIFICANT CHANGE UP
GRAM STN FLD: SIGNIFICANT CHANGE UP
HCT VFR BLD CALC: 36.3 % — LOW (ref 42–52)
HGB BLD-MCNC: 12.3 G/DL — LOW (ref 14–18)
IMM GRANULOCYTES NFR BLD AUTO: 0.7 % — HIGH (ref 0.1–0.3)
LACTATE SERPL-SCNC: 2 MMOL/L — SIGNIFICANT CHANGE UP (ref 0.7–2)
LYMPHOCYTES # BLD AUTO: 0.61 K/UL — LOW (ref 1.2–3.4)
LYMPHOCYTES # BLD AUTO: 3.1 % — LOW (ref 20.5–51.1)
MAGNESIUM SERPL-MCNC: 3 MG/DL — HIGH (ref 1.8–2.4)
MCHC RBC-ENTMCNC: 31.2 PG — HIGH (ref 27–31)
MCHC RBC-ENTMCNC: 33.9 G/DL — SIGNIFICANT CHANGE UP (ref 32–37)
MCV RBC AUTO: 92.1 FL — SIGNIFICANT CHANGE UP (ref 80–94)
METHOD TYPE: SIGNIFICANT CHANGE UP
METHOD TYPE: SIGNIFICANT CHANGE UP
MONOCYTES # BLD AUTO: 0.95 K/UL — HIGH (ref 0.1–0.6)
MONOCYTES NFR BLD AUTO: 4.8 % — SIGNIFICANT CHANGE UP (ref 1.7–9.3)
NEUTROPHILS # BLD AUTO: 17.92 K/UL — HIGH (ref 1.4–6.5)
NEUTROPHILS NFR BLD AUTO: 91.3 % — HIGH (ref 42.2–75.2)
NRBC # BLD: 0 /100 WBCS — SIGNIFICANT CHANGE UP (ref 0–0)
P AERUGINOSA DNA BLD POS NAA+NON-PROBE: SIGNIFICANT CHANGE UP
PLATELET # BLD AUTO: 141 K/UL — SIGNIFICANT CHANGE UP (ref 130–400)
POTASSIUM SERPL-MCNC: 5 MMOL/L — SIGNIFICANT CHANGE UP (ref 3.5–5)
POTASSIUM SERPL-SCNC: 5 MMOL/L — SIGNIFICANT CHANGE UP (ref 3.5–5)
PROT SERPL-MCNC: 5.1 G/DL — LOW (ref 6–8)
RBC # BLD: 3.94 M/UL — LOW (ref 4.7–6.1)
RBC # FLD: 13.3 % — SIGNIFICANT CHANGE UP (ref 11.5–14.5)
SARS-COV-2 IGG+IGM SERPL QL IA: >250 U/ML — HIGH
SARS-COV-2 IGG+IGM SERPL QL IA: POSITIVE
SODIUM SERPL-SCNC: 138 MMOL/L — SIGNIFICANT CHANGE UP (ref 135–146)
SPECIMEN SOURCE: SIGNIFICANT CHANGE UP
TROPONIN T SERPL-MCNC: 0.03 NG/ML — CRITICAL HIGH
TROPONIN T SERPL-MCNC: 0.05 NG/ML — CRITICAL HIGH
TSH SERPL-MCNC: 3.01 UIU/ML — SIGNIFICANT CHANGE UP (ref 0.27–4.2)
VIT B12 SERPL-MCNC: 1026 PG/ML — SIGNIFICANT CHANGE UP (ref 232–1245)
WBC # BLD: 19.63 K/UL — HIGH (ref 4.8–10.8)
WBC # FLD AUTO: 19.63 K/UL — HIGH (ref 4.8–10.8)

## 2021-06-19 PROCEDURE — 99233 SBSQ HOSP IP/OBS HIGH 50: CPT

## 2021-06-19 PROCEDURE — 93010 ELECTROCARDIOGRAM REPORT: CPT

## 2021-06-19 PROCEDURE — 99222 1ST HOSP IP/OBS MODERATE 55: CPT

## 2021-06-19 RX ORDER — LINEZOLID 600 MG/300ML
600 INJECTION, SOLUTION INTRAVENOUS ONCE
Refills: 0 | Status: COMPLETED | OUTPATIENT
Start: 2021-06-19 | End: 2021-06-19

## 2021-06-19 RX ORDER — PIPERACILLIN AND TAZOBACTAM 4; .5 G/20ML; G/20ML
4.5 INJECTION, POWDER, LYOPHILIZED, FOR SOLUTION INTRAVENOUS EVERY 8 HOURS
Refills: 0 | Status: DISCONTINUED | OUTPATIENT
Start: 2021-06-19 | End: 2021-06-22

## 2021-06-19 RX ORDER — LINEZOLID 600 MG/300ML
600 INJECTION, SOLUTION INTRAVENOUS EVERY 12 HOURS
Refills: 0 | Status: DISCONTINUED | OUTPATIENT
Start: 2021-06-19 | End: 2021-06-22

## 2021-06-19 RX ORDER — LINEZOLID 600 MG/300ML
INJECTION, SOLUTION INTRAVENOUS
Refills: 0 | Status: DISCONTINUED | OUTPATIENT
Start: 2021-06-19 | End: 2021-06-22

## 2021-06-19 RX ORDER — FUROSEMIDE 40 MG
40 TABLET ORAL ONCE
Refills: 0 | Status: COMPLETED | OUTPATIENT
Start: 2021-06-19 | End: 2021-06-19

## 2021-06-19 RX ORDER — PIPERACILLIN AND TAZOBACTAM 4; .5 G/20ML; G/20ML
4.5 INJECTION, POWDER, LYOPHILIZED, FOR SOLUTION INTRAVENOUS ONCE
Refills: 0 | Status: COMPLETED | OUTPATIENT
Start: 2021-06-19 | End: 2021-06-19

## 2021-06-19 RX ADMIN — PIPERACILLIN AND TAZOBACTAM 25 GRAM(S): 4; .5 INJECTION, POWDER, LYOPHILIZED, FOR SOLUTION INTRAVENOUS at 21:31

## 2021-06-19 RX ADMIN — Medication 25 GRAM(S): at 00:10

## 2021-06-19 RX ADMIN — Medication 250 MILLIGRAM(S): at 05:12

## 2021-06-19 RX ADMIN — LINEZOLID 300 MILLIGRAM(S): 600 INJECTION, SOLUTION INTRAVENOUS at 11:28

## 2021-06-19 RX ADMIN — Medication 10 MILLIGRAM(S): at 11:14

## 2021-06-19 RX ADMIN — LACTULOSE 10 GRAM(S): 10 SOLUTION ORAL at 11:13

## 2021-06-19 RX ADMIN — MEROPENEM 100 MILLIGRAM(S): 1 INJECTION INTRAVENOUS at 01:11

## 2021-06-19 RX ADMIN — POLYETHYLENE GLYCOL 3350 17 GRAM(S): 17 POWDER, FOR SOLUTION ORAL at 17:08

## 2021-06-19 RX ADMIN — Medication 5.38 MICROGRAM(S)/KG/MIN: at 02:01

## 2021-06-19 RX ADMIN — TAMSULOSIN HYDROCHLORIDE 0.4 MILLIGRAM(S): 0.4 CAPSULE ORAL at 21:31

## 2021-06-19 RX ADMIN — Medication 40 MILLIGRAM(S): at 13:00

## 2021-06-19 RX ADMIN — PIPERACILLIN AND TAZOBACTAM 200 GRAM(S): 4; .5 INJECTION, POWDER, LYOPHILIZED, FOR SOLUTION INTRAVENOUS at 11:01

## 2021-06-19 RX ADMIN — Medication 60 MILLIGRAM(S): at 11:41

## 2021-06-19 RX ADMIN — Medication 40 MILLIGRAM(S): at 05:12

## 2021-06-19 RX ADMIN — Medication 40 MILLIGRAM(S): at 21:31

## 2021-06-19 RX ADMIN — LINEZOLID 300 MILLIGRAM(S): 600 INJECTION, SOLUTION INTRAVENOUS at 17:35

## 2021-06-19 RX ADMIN — ENOXAPARIN SODIUM 40 MILLIGRAM(S): 100 INJECTION SUBCUTANEOUS at 11:14

## 2021-06-19 RX ADMIN — CHLORHEXIDINE GLUCONATE 1 APPLICATION(S): 213 SOLUTION TOPICAL at 05:11

## 2021-06-19 RX ADMIN — POLYETHYLENE GLYCOL 3350 17 GRAM(S): 17 POWDER, FOR SOLUTION ORAL at 05:12

## 2021-06-19 NOTE — CONSULT NOTE ADULT - SUBJECTIVE AND OBJECTIVE BOX
TISH SHAIKH  65y, Male  Allergy: No Known Allergies      CHIEF COMPLAINT:   Nephrolithiasis (2021 08:51)      LOS  2d    HPI  HPI:  64 years old Male with PMHx of Cerebral Palsy, Seizure disorder, spastic paraplegia s/p PEG, BPH, Hx of Nephrolithiasis s/p pcnl x 2 with known incompetent bladder neck s/p SPT presents to ED with Right sided flank pain. Patient seen and examined at bedside, History obtained from group home chart. Patient reports feeling flank pain in the afternoon, radiating to RLQ, 10/10 with associated nausea/vomiting. Patient had Right-sided PCNL . Denies any fever, chills, SOB, CP, dysuria, hematuria. SPT in place draining cloudy yellow urine.     In ED, vitals were WNL, Labs significant for elevated lactate 2.2, UA + for bacteruria, LKE +.  CT A/P shows Delayed right nephrogram. Moderate right hydroureteronephrosis secondary to an obstructing right proximal ureteral stone. Stone size is difficult to measure due to extensive artifact. This stone appears to measure at least 6 mm craniocaudally (max ). He also has a right upper pole stone. Patient received 8mg of Morphine, IVF, Zofran, toradol and cefepime in ED patient reports pain is the same 10/10. To be admitted under medicine.  (2021 01:02)      INFECTIOUS DISEASE HISTORY:  ID consulted for UTI, R PCN,    BCX Pseudomonas, VRE  2020 Hx Polymicrobial bacteremia with MSSA & GBS, unclear source  2020  hx COVID19  on meropenem/vanc    PMH  PAST MEDICAL & SURGICAL HISTORY:  BPH (benign prostatic hyperplasia)    Cerebral palsy    Seizure  last seizure &gt;10 years ago    Osteoporosis    Spastic quadriplegia    Urinary calculi    Urinary retention    Asthma    S/P percutaneous endoscopic gastrostomy (PEG) tube placement    H/O cystoscopy    Suprapubic catheter        FAMILY HISTORY  No pertinent family history in first degree relatives    Family history unknown        SOCIAL HISTORY  Social History:  not available (2021 01:02)        ROS  unable to obtain history secondary to patient's mental status and/or sedation     VITALS:  T(F): 96.6, Max: 96.6 (21 @ 08:32)  HR: 93  BP: 105/66  RR: 31Vital Signs Last 24 Hrs  T(C): 35.9 (2021 08:32), Max: 35.9 (2021 08:32)  T(F): 96.6 (2021 08:32), Max: 96.6 (2021 08:32)  HR: 93 (2021 09:00) (85 - 100)  BP: 105/66 (2021 09:00) (105/66 - 131/61)  BP(mean): 79 (2021 09:00) (79 - 88)  RR: 31 (2021 08:32) (31 - 31)  SpO2: 97% (2021 08:00) (95% - 98%)    PHYSICAL EXAM:  ***    TESTS & MEASUREMENTS:                        12.3   19.63 )-----------( 141      ( 2021 07:11 )             36.3         138  |  104  |  28<H>  ----------------------------<  165<H>  5.0   |  22  |  0.8    Ca    7.9<L>      2021 07:11  Mg     3.0         TPro  5.1<L>  /  Alb  2.8<L>  /  TBili  0.3  /  DBili  x   /  AST  24  /  ALT  14  /  AlkPhos  86      eGFR if Non African American: 94 mL/min/1.73M2 (21 @ 07:11)  eGFR if : 109 mL/min/1.73M2 (21 @ 07:11)  eGFR if Non African American: 79 mL/min/1.73M2 (21 @ 20:18)  eGFR if : 91 mL/min/1.73M2 (21 @ 20:18)    LIVER FUNCTIONS - ( 2021 07:11 )  Alb: 2.8 g/dL / Pro: 5.1 g/dL / ALK PHOS: 86 U/L / ALT: 14 U/L / AST: 24 U/L / GGT: x           Urinalysis Basic - ( 2021 20:30 )    Color: Dark Brown / Appearance: Turbid / S.024 / pH: x  Gluc: x / Ketone: Negative  / Bili: Negative / Urobili: <2 mg/dL   Blood: x / Protein: 300 mg/dL / Nitrite: Negative   Leuk Esterase: Moderate / RBC: >720 /HPF / WBC 10 /HPF   Sq Epi: x / Non Sq Epi: 13 /HPF / Bacteria: Negative        Culture - Blood (collected 21 @ 09:29)  Source: .Blood None  Gram Stain (21 @ 07:48):    Upon re-evaluation of gram stain:    Growth in aerobic and anaerobic bottles: Gram Negative Rods and Gram    Positive Cocci in Pairs and Chains  Preliminary Report (21 @ 07:48):    Growth in aerobic and anaerobic bottles: Gram Positive Cocci in Pairs and    Chains and Gram Negative Rods    ***Blood Panel PCR results on this specimen are available    approximately 3 hours after the Gram stain result.***    Gram stain, PCR, and/or culture results may not always    correspond due to difference in methodologies.    ************************************************************    This PCR assay was performed by multiplex PCR. This    Assay tests for 66 bacterial and resistance gene targets.    Please refer to the [facility] test directory    at [email or web address] for details.  Organism: Blood Culture PCR  Blood Culture PCR (21 @ 07:52)  Organism: Blood Culture PCR (21 @ 07:52)      -  Pseudomonas aeruginosa: Detec      Method Type: PCR  Organism: Blood Culture PCR (21 @ 07:34)      -  Enterococcus faecalis,VRE: Detec      Method Type: PCR    Culture - Blood (collected 20 @ 06:19)  Source: .Blood None  Final Report (20 @ 13:01):    No Growth Final    Culture - Blood (collected 09-10-20 @ 16:00)  Source: .Blood Blood-Peripheral  Final Report (20 @ 02:01):    No Growth Final    Culture - Blood (collected 20 @ 18:35)  Source: .Blood Blood  Gram Stain (20 @ 09:01):    Growth in aerobic bottle: Gram Positive Cocci in Pairs and Chains and    Gram Positive Cocci in Clusters    Growth in anaerobic bottle: Gram Positive Cocci in Pairs and Chains and    Gram Positive Cocci in Clusters  Final Report (20 @ 08:25):    Growth in aerobic and anaerobic bottles: Staphylococcus aureus    Growth in aerobic and anaerobic bottles: Streptococcus agalactiae Group B    "Due to technical problems, Proteus sp. will Not be reported as part of    the BCID panel until further notice"    ***Blood Panel PCR results on this specimen are available    approximately 3 hours after the Gram stain result.***    Gram stain, PCR, and/or culture results may not always    correspond due to difference in methodologies.    ************************************************************    This PCR assay was performed using [employer].    The following targets are tested for: Enterococcus,    vancomycin resistant enterococci, Listeria monocytogenes,    coagulase negative staphylococci, S. aureus,    methicillin resistant S. aureus, Streptococcus agalactiae    (Group B), S. pneumoniae, S. pyogenes (Group A),    Acinetobacter baumannii, Enterobacter cloacae, E. coli,    Klebsiella oxytoca, K. pneumoniae, Proteus sp.,    Serratia marcescens, Haemophilus influenzae,    Neisseria meningitidis, Pseudomonas aeruginosa, Candida    albicans, C. glabrata, C krusei, C parapsilosis,    C. tropicalis and the KPC resistance gene.  Organism: Blood Culture PCR  Staphylococcus aureus  Sreptococcus agalactiae  Sreptococcus agalactiae (20 @ 08:25)  Organism: Sreptococcus agalactiae (20 @ 08:25)      -  Ceftriaxone: S 0.094      -  Penicillin: S 0.064 Predicts results for ampicillin, amoxicillin, amoxicillin/clavulanate, ampicillin/sulbactam, 1st, 2nd and 3rd generation cephalosporins and carbapenems.      Method Type: ETEST  Organism: Sreptococcus agalactiae (20 @ 08:25)      -  Clindamycin: S      -  Levofloxacin: S      -  Vancomycin: S      Method Type: KB  Organism: Staphylococcus aureus (20 @ 08:25)      -  Ampicillin/Sulbactam: S <=8/4      -  Cefazolin: S <=4      -  Clindamycin: S <=0.25      -  Erythromycin: R >4      -  Gentamicin: I 8 Should not be used as monotherapy      -  Oxacillin: S 1      -  Penicillin: R >8      -  RIF- Rifampin: S <=1 Should not be used as monotherapy      -  Tetra/Doxy: S <=1      -  Trimethoprim/Sulfamethoxazole: S <=0.5/9.5      -  Vancomycin: S 1      Method Type: MIGUEL A  Organism: Blood Culture PCR (20 @ 08:25)      -  Staphylococcus aureus: Detec Any isolate of Staphylococcus aureus from a blood culture is NOT considered a contaminant.      -  Streptococcus agalactiae (Group B): Detec      Method Type: PCR    Culture - Blood (collected 20 @ 18:35)  Source: .Blood Blood-Peripheral  Gram Stain (20 @ 10:03):    Growth in anaerobic bottle: Gram Positive Cocci in Clusters and Gram    positive cocci in pairs    Growth in aerobic bottle: Gram Positive Cocci in Clusters and Gram    positive cocci in pairs  Final Report (20 @ 08:26):    Growth in aerobic and anaerobic bottles: Staphylococcus aureus    Growth in aerobic and anaerobic bottles: Streptococcus agalactiae Group B    See previous culture [id number]    Culture - Urine (collected 20 @ 18:35)  Source: .Urine Catheterized  Final Report (20 @ 21:45):    >=3 organisms. Probable collection contamination.        Lactate, Blood: 2.0 mmol/L (21 @ 07:11)  Lactate, Blood: 2.3 mmol/L (21 @ 22:02)  Lactate, Blood: 1.8 mmol/L (21 @ 09:29)  Lactate, Blood: 2.2 mmol/L (21 @ 16:27)      INFECTIOUS DISEASES TESTING  COVID-19 PCR: NotDetec (21 @ 22:22)  Rapid RVP Result: NotDetec (21 @ 11:14)  COVID-19 PCR: NotDetec (20 @ 15:45)  COVID-19 PCR: NotDetec (20 @ 14:19)      INFLAMMATORY MARKERS  C-Reactive Protein, Serum: 120 mg/L (21 @ 09:29)      RADIOLOGY & ADDITIONAL TESTS:  I have personally reviewed the last Chest xray  CXR  Xray Chest 1 View AP/PA:   EXAM:  XR CHEST 1 VIEW            PROCEDURE DATE:  2021            INTERPRETATION:  Clinical History / Reason for exam: Sepsis.    Comparison : Chest radiograph 2021.    Technique/Positioning: Frontal portable, low lung volumes.    Findings:    Support devices: Telemetry leads are seen    Cardiac/mediastinum/hilum: Unremarkable.    Lung parenchyma/Pleura: Atelectasis    Skeleton/soft tissues: Unchanged    Impression:    Atelectasis, low lung volumes.                  TODD CRUZ MD; Attending Interventional Radiologist  This document has been electronically signed. 2021  2:22PM (21 @ 13:56)      CT  CT Abdomen and Pelvis w/ IV Cont:   EXAM:  CT ABDOMEN AND PELVIS IC            PROCEDURE DATE:  2021            INTERPRETATION:  CLINICAL HISTORY: Right flank pain.    TECHNIQUE: Contiguous axial CT images were obtained from the lower chest to the pubic symphysis following administration of Optiray intravenous contrast. Oral contrast was not administered. Reformatted images in the coronal and sagittal planes were acquired.    COMPARISON: CT abdomen pelvis dated 2020.      FINDINGS:    The study is degraded by motion and beam hardening artifact secondary to patient arm positioning.    LOWER CHEST: Bibasilar atelectasis.    HEPATOBILIARY: Left hepatic lobe hypodensity to small to characterize.    SPLEEN: Unremarkable.    PANCREAS: Unremarkable.    ADRENAL GLANDS: Unremarkable.    KIDNEYS: Delayed right nephrogram. Moderate right hydroureteronephrosis secondary to an obstructing right proximal ureteral stone. Stone size is difficult to measure due to extensive artifact. This stone appears to measure at least 6 mm craniocaudally (max ). Additional nonobstructing right renal calculi. Linear dense material in the left renal collecting system likely representing excreted contrast material. Possible punctate left upper pole renal calculus. Left renal hypodensity too small to characterize.    ABDOMINOPELVIC NODES: Unremarkable    PELVIC ORGANS: Suprapubic catheter within the urinary bladder. Bladder wall thickening.. Prostatic calcifications.    PERITONEUM/MESENTERY/BOWEL: Gaseous distention of sigmoid colon. No evidence of bowel obstruction free air or fluid collection. Unremarkable appendix. Gastrostomy tube in place.    BONES/SOFT TISSUES: Osteopenia. Degenerative changes of the spine. Chronic pelvic deformity. Small fluid within bilateral inguinal hernias.          IMPRESSION:    1.  Obstructing proximal right ureteral stone with moderate hydroureteronephrosis.  2.  Bladder wall thickening. Correlate with urinalysis.  3.  See body of the report for additional findings.              VALENTINA DORSEY MD; Attending Radiologist  This document has been electronically signed. 2021  7:33PM (21 @ 18:58)      CARDIOLOGY TESTING  12 Lead ECG:   Ventricular Rate 112 BPM    Atrial Rate 112 BPM    P-R Interval 120 ms    QRS Duration 84 ms    Q-T Interval 348 ms    QTC Calculation(Bazett) 475 ms    P Axis 45 degrees    R Axis 36 degrees    T Axis 4 degrees    Diagnosis Line Sinus tachycardia  Otherwise normal ECG    Confirmed by EBENEZER EDDY MD ([id number]) on 2021 4:15:45 PM (21 @ 14:35)  12 Lead ECG:   Ventricular Rate 133 BPM    Atrial Rate 133 BPM    P-R Interval 98 ms    QRS Duration 106 ms    Q-T Interval 312 ms    QTC Calculation(Bazett) 464 ms    P Axis 50 degrees    R Axis -9 degrees    T Axis 42 degrees    Diagnosis Line Sinus tachycardiawith short OR  Inferior-posterior infarct , possibly acute  Marked ST abnormality, possible lateral subendocardial injury  ** ** ACUTE MI / STEMI ** **  Consider right ventricular involvement in acute inferior infarct  Abnormal ECG    Confirmed by EBENEZER EDDY MD ([id number]) on 2021 4:15:43 PM (21 @ 13:36)      MEDICATIONS  bisacodyl Suppository 10 Rectal daily  chlorhexidine 4% Liquid 1 Topical <User Schedule>  enoxaparin Injectable 40 SubCutaneous daily  lactated ringers Bolus 1000 IV Bolus once  lactated ringers. 1000 IV Continuous <Continuous>  lactated ringers. 1000 IV Continuous <Continuous>  lactated ringers. 1000 IV Continuous <Continuous>  lactulose Syrup 10 Oral daily  meropenem  IVPB 1000 IV Intermittent every 8 hours  methylPREDNISolone sodium succinate Injectable 40 IV Push every 8 hours  norepinephrine Infusion 0.05 IV Continuous <Continuous>  PHENobarbital Injectable 60 IV Push daily  polyethylene glycol 3350 17 Oral two times a day  tamsulosin 0.4 Oral at bedtime  vancomycin  IVPB     vancomycin  IVPB 1000 IV Intermittent every 12 hours        ANTIBIOTICS:  meropenem  IVPB 1000 milliGRAM(s) IV Intermittent every 8 hours  vancomycin  IVPB      vancomycin  IVPB 1000 milliGRAM(s) IV Intermittent every 12 hours      ALLERGIES:  No Known Allergies       TISH SHAIKH  65y, Male  Allergy: No Known Allergies      CHIEF COMPLAINT:   Nephrolithiasis (2021 08:51)      LOS  2d    HPI  HPI:  64 years old Male with PMHx of Cerebral Palsy, Seizure disorder, spastic paraplegia s/p PEG, BPH, Hx of Nephrolithiasis s/p pcnl x 2 with known incompetent bladder neck s/p SPT presents to ED with Right sided flank pain. Patient seen and examined at bedside, History obtained from group home chart. Patient reports feeling flank pain in the afternoon, radiating to RLQ, 10/10 with associated nausea/vomiting. Patient had Right-sided PCNL . Denies any fever, chills, SOB, CP, dysuria, hematuria. SPT in place draining cloudy yellow urine.     In ED, vitals were WNL, Labs significant for elevated lactate 2.2, UA + for bacteruria, LKE +.  CT A/P shows Delayed right nephrogram. Moderate right hydroureteronephrosis secondary to an obstructing right proximal ureteral stone. Stone size is difficult to measure due to extensive artifact. This stone appears to measure at least 6 mm craniocaudally (max ). He also has a right upper pole stone. Patient received 8mg of Morphine, IVF, Zofran, toradol and cefepime in ED patient reports pain is the same 10/10. To be admitted under medicine.  (2021 01:02)      INFECTIOUS DISEASE HISTORY:  ID consulted for UTI, R PCN,    BCX Pseudomonas, VRE  2020 Hx Polymicrobial bacteremia with MSSA & GBS, unclear source  2020  hx COVID19  on meropenem/vanc    PMH  PAST MEDICAL & SURGICAL HISTORY:  BPH (benign prostatic hyperplasia)    Cerebral palsy    Seizure  last seizure &gt;10 years ago    Osteoporosis    Spastic quadriplegia    Urinary calculi    Urinary retention    Asthma    S/P percutaneous endoscopic gastrostomy (PEG) tube placement    H/O cystoscopy    Suprapubic catheter        FAMILY HISTORY  No pertinent family history in first degree relatives    Family history unknown        SOCIAL HISTORY  Social History:  not available (2021 01:02)        ROS  General: Denies rigors, nightsweats  HEENT: Denies headache, rhinorrhea, sore throat, eye pain  CV: Denies CP, palpitations  PULM: Denies wheezing, hemoptysis  GI: Denies hematemesis, hematochezia, melena  : Denies discharge, hematuria  MSK: Denies arthralgias, myalgias  SKIN: Denies rash, lesions  NEURO: Denies paresthesias, weakness  PSYCH: Denies depression, anxiety     VITALS:  T(F): 96.6, Max: 96.6 (21 @ 08:32)  HR: 93  BP: 105/66  RR: 31Vital Signs Last 24 Hrs  T(C): 35.9 (2021 08:32), Max: 35.9 (2021 08:32)  T(F): 96.6 (2021 08:32), Max: 96.6 (2021 08:32)  HR: 93 (2021 09:00) (85 - 100)  BP: 105/66 (2021 09:00) (105/66 - 131/61)  BP(mean): 79 (2021 09:00) (79 - 88)  RR: 31 (2021 08:32) (31 - 31)  SpO2: 97% (2021 08:00) (95% - 98%)    PHYSICAL EXAM:  Gen: NAD, resting in bed, chronically ill appearing contracted  HEENT: Normocephalic, atraumatic  Neck: supple, no lymphadenopathy  CV: Regular rate & regular rhythm  Lungs: decreased BS at bases, no fremitus  Abdomen: Soft, BS present PEG SPC R PCN  Ext: Warm, well perfused  Neuro: non focal, awake  Skin: no rash, no erythema  Lines: no phlebitis     TESTS & MEASUREMENTS:                        12.3   19.63 )-----------( 141      ( 2021 07:11 )             36.3         138  |  104  |  28<H>  ----------------------------<  165<H>  5.0   |  22  |  0.8    Ca    7.9<L>      2021 07:11  Mg     3.0         TPro  5.1<L>  /  Alb  2.8<L>  /  TBili  0.3  /  DBili  x   /  AST  24  /  ALT  14  /  AlkPhos  86      eGFR if Non African American: 94 mL/min/1.73M2 (21 @ 07:11)  eGFR if : 109 mL/min/1.73M2 (21 @ 07:11)  eGFR if Non African American: 79 mL/min/1.73M2 (21 @ 20:18)  eGFR if : 91 mL/min/1.73M2 (21 @ 20:18)    LIVER FUNCTIONS - ( 2021 07:11 )  Alb: 2.8 g/dL / Pro: 5.1 g/dL / ALK PHOS: 86 U/L / ALT: 14 U/L / AST: 24 U/L / GGT: x           Urinalysis Basic - ( 2021 20:30 )    Color: Dark Brown / Appearance: Turbid / S.024 / pH: x  Gluc: x / Ketone: Negative  / Bili: Negative / Urobili: <2 mg/dL   Blood: x / Protein: 300 mg/dL / Nitrite: Negative   Leuk Esterase: Moderate / RBC: >720 /HPF / WBC 10 /HPF   Sq Epi: x / Non Sq Epi: 13 /HPF / Bacteria: Negative        Culture - Blood (collected 21 @ 09:29)  Source: .Blood None  Gram Stain (21 @ 07:48):    Upon re-evaluation of gram stain:    Growth in aerobic and anaerobic bottles: Gram Negative Rods and Gram    Positive Cocci in Pairs and Chains  Preliminary Report (21 @ 07:48):    Growth in aerobic and anaerobic bottles: Gram Positive Cocci in Pairs and    Chains and Gram Negative Rods    ***Blood Panel PCR results on this specimen are available    approximately 3 hours after the Gram stain result.***    Gram stain, PCR, and/or culture results may not always    correspond due to difference in methodologies.    ************************************************************    This PCR assay was performed by multiplex PCR. This    Assay tests for 66 bacterial and resistance gene targets.    Please refer to the [facility] test directory    at [email or web address] for details.  Organism: Blood Culture PCR  Blood Culture PCR (21 @ 07:52)  Organism: Blood Culture PCR (21 @ 07:52)      -  Pseudomonas aeruginosa: Detec      Method Type: PCR  Organism: Blood Culture PCR (21 @ 07:34)      -  Enterococcus faecalis,VRE: Detec      Method Type: PCR    Culture - Blood (collected 20 @ 06:19)  Source: .Blood None  Final Report (20 @ 13:01):    No Growth Final    Culture - Blood (collected 09-10-20 @ 16:00)  Source: .Blood Blood-Peripheral  Final Report (20 @ 02:01):    No Growth Final    Culture - Blood (collected 20 @ 18:35)  Source: .Blood Blood  Gram Stain (20 @ 09:01):    Growth in aerobic bottle: Gram Positive Cocci in Pairs and Chains and    Gram Positive Cocci in Clusters    Growth in anaerobic bottle: Gram Positive Cocci in Pairs and Chains and    Gram Positive Cocci in Clusters  Final Report (20 @ 08:25):    Growth in aerobic and anaerobic bottles: Staphylococcus aureus    Growth in aerobic and anaerobic bottles: Streptococcus agalactiae Group B    "Due to technical problems, Proteus sp. will Not be reported as part of    the BCID panel until further notice"    ***Blood Panel PCR results on this specimen are available    approximately 3 hours after the Gram stain result.***    Gram stain, PCR, and/or culture results may not always    correspond due to difference in methodologies.    ************************************************************    This PCR assay was performed using [employer].    The following targets are tested for: Enterococcus,    vancomycin resistant enterococci, Listeria monocytogenes,    coagulase negative staphylococci, S. aureus,    methicillin resistant S. aureus, Streptococcus agalactiae    (Group B), S. pneumoniae, S. pyogenes (Group A),    Acinetobacter baumannii, Enterobacter cloacae, E. coli,    Klebsiella oxytoca, K. pneumoniae, Proteus sp.,    Serratia marcescens, Haemophilus influenzae,    Neisseria meningitidis, Pseudomonas aeruginosa, Candida    albicans, C. glabrata, C krusei, C parapsilosis,    C. tropicalis and the KPC resistance gene.  Organism: Blood Culture PCR  Staphylococcus aureus  Sreptococcus agalactiae  Sreptococcus agalactiae (20 @ 08:25)  Organism: Sreptococcus agalactiae (20 @ 08:25)      -  Ceftriaxone: S 0.094      -  Penicillin: S 0.064 Predicts results for ampicillin, amoxicillin, amoxicillin/clavulanate, ampicillin/sulbactam, 1st, 2nd and 3rd generation cephalosporins and carbapenems.      Method Type: ETEST  Organism: Sreptococcus agalactiae (20 @ 08:25)      -  Clindamycin: S      -  Levofloxacin: S      -  Vancomycin: S      Method Type: KB  Organism: Staphylococcus aureus (20 @ 08:25)      -  Ampicillin/Sulbactam: S <=8/4      -  Cefazolin: S <=4      -  Clindamycin: S <=0.25      -  Erythromycin: R >4      -  Gentamicin: I 8 Should not be used as monotherapy      -  Oxacillin: S 1      -  Penicillin: R >8      -  RIF- Rifampin: S <=1 Should not be used as monotherapy      -  Tetra/Doxy: S <=1      -  Trimethoprim/Sulfamethoxazole: S <=0.5/9.5      -  Vancomycin: S 1      Method Type: MIGUEL A  Organism: Blood Culture PCR (20 @ 08:25)      -  Staphylococcus aureus: Detec Any isolate of Staphylococcus aureus from a blood culture is NOT considered a contaminant.      -  Streptococcus agalactiae (Group B): Detec      Method Type: PCR    Culture - Blood (collected 20 @ 18:35)  Source: .Blood Blood-Peripheral  Gram Stain (20 @ 10:03):    Growth in anaerobic bottle: Gram Positive Cocci in Clusters and Gram    positive cocci in pairs    Growth in aerobic bottle: Gram Positive Cocci in Clusters and Gram    positive cocci in pairs  Final Report (20 @ 08:26):    Growth in aerobic and anaerobic bottles: Staphylococcus aureus    Growth in aerobic and anaerobic bottles: Streptococcus agalactiae Group B    See previous culture [id number]    Culture - Urine (collected 20 @ 18:35)  Source: .Urine Catheterized  Final Report (20 @ 21:45):    >=3 organisms. Probable collection contamination.        Lactate, Blood: 2.0 mmol/L (21 @ 07:11)  Lactate, Blood: 2.3 mmol/L (21 @ 22:02)  Lactate, Blood: 1.8 mmol/L (21 @ 09:29)  Lactate, Blood: 2.2 mmol/L (21 @ 16:27)      INFECTIOUS DISEASES TESTING  COVID-19 PCR: NotDetec (21 @ 22:22)  Rapid RVP Result: NotDetec (21 @ 11:14)  COVID-19 PCR: NotDetec (20 @ 15:45)  COVID-19 PCR: NotDetec (20 @ 14:19)      INFLAMMATORY MARKERS  C-Reactive Protein, Serum: 120 mg/L (21 @ 09:29)      RADIOLOGY & ADDITIONAL TESTS:  I have personally reviewed the last Chest xray  CXR  Xray Chest 1 View AP/PA:   EXAM:  XR CHEST 1 VIEW            PROCEDURE DATE:  2021            INTERPRETATION:  Clinical History / Reason for exam: Sepsis.    Comparison : Chest radiograph 2021.    Technique/Positioning: Frontal portable, low lung volumes.    Findings:    Support devices: Telemetry leads are seen    Cardiac/mediastinum/hilum: Unremarkable.    Lung parenchyma/Pleura: Atelectasis    Skeleton/soft tissues: Unchanged    Impression:    Atelectasis, low lung volumes.                  TODD CRUZ MD; Attending Interventional Radiologist  This document has been electronically signed. 2021  2:22PM (21 @ 13:56)      CT  CT Abdomen and Pelvis w/ IV Cont:   EXAM:  CT ABDOMEN AND PELVIS IC            PROCEDURE DATE:  2021            INTERPRETATION:  CLINICAL HISTORY: Right flank pain.    TECHNIQUE: Contiguous axial CT images were obtained from the lower chest to the pubic symphysis following administration of Optiray intravenous contrast. Oral contrast was not administered. Reformatted images in the coronal and sagittal planes were acquired.    COMPARISON: CT abdomen pelvis dated 2020.      FINDINGS:    The study is degraded by motion and beam hardening artifact secondary to patient arm positioning.    LOWER CHEST: Bibasilar atelectasis.    HEPATOBILIARY: Left hepatic lobe hypodensity to small to characterize.    SPLEEN: Unremarkable.    PANCREAS: Unremarkable.    ADRENAL GLANDS: Unremarkable.    KIDNEYS: Delayed right nephrogram. Moderate right hydroureteronephrosis secondary to an obstructing right proximal ureteral stone. Stone size is difficult to measure due to extensive artifact. This stone appears to measure at least 6 mm craniocaudally (max ). Additional nonobstructing right renal calculi. Linear dense material in the left renal collecting system likely representing excreted contrast material. Possible punctate left upper pole renal calculus. Left renal hypodensity too small to characterize.    ABDOMINOPELVIC NODES: Unremarkable    PELVIC ORGANS: Suprapubic catheter within the urinary bladder. Bladder wall thickening.. Prostatic calcifications.    PERITONEUM/MESENTERY/BOWEL: Gaseous distention of sigmoid colon. No evidence of bowel obstruction free air or fluid collection. Unremarkable appendix. Gastrostomy tube in place.    BONES/SOFT TISSUES: Osteopenia. Degenerative changes of the spine. Chronic pelvic deformity. Small fluid within bilateral inguinal hernias.          IMPRESSION:    1.  Obstructing proximal right ureteral stone with moderate hydroureteronephrosis.  2.  Bladder wall thickening. Correlate with urinalysis.  3.  See body of the report for additional findings.              VALENTINA DORSEY MD; Attending Radiologist  This document has been electronically signed. 2021  7:33PM (21 @ 18:58)      CARDIOLOGY TESTING  12 Lead ECG:   Ventricular Rate 112 BPM    Atrial Rate 112 BPM    P-R Interval 120 ms    QRS Duration 84 ms    Q-T Interval 348 ms    QTC Calculation(Bazett) 475 ms    P Axis 45 degrees    R Axis 36 degrees    T Axis 4 degrees    Diagnosis Line Sinus tachycardia  Otherwise normal ECG    Confirmed by EBENEZER EDDY MD ([id number]) on 2021 4:15:45 PM (21 @ 14:35)  12 Lead ECG:   Ventricular Rate 133 BPM    Atrial Rate 133 BPM    P-R Interval 98 ms    QRS Duration 106 ms    Q-T Interval 312 ms    QTC Calculation(Bazett) 464 ms    P Axis 50 degrees    R Axis -9 degrees    T Axis 42 degrees    Diagnosis Line Sinus tachycardiawith short RI  Inferior-posterior infarct , possibly acute  Marked ST abnormality, possible lateral subendocardial injury  ** ** ACUTE MI / STEMI ** **  Consider right ventricular involvement in acute inferior infarct  Abnormal ECG    Confirmed by EBENEZER EDDY MD ([id number]) on 2021 4:15:43 PM (21 @ 13:36)      MEDICATIONS  bisacodyl Suppository 10 Rectal daily  chlorhexidine 4% Liquid 1 Topical <User Schedule>  enoxaparin Injectable 40 SubCutaneous daily  lactated ringers Bolus 1000 IV Bolus once  lactated ringers. 1000 IV Continuous <Continuous>  lactated ringers. 1000 IV Continuous <Continuous>  lactated ringers. 1000 IV Continuous <Continuous>  lactulose Syrup 10 Oral daily  meropenem  IVPB 1000 IV Intermittent every 8 hours  methylPREDNISolone sodium succinate Injectable 40 IV Push every 8 hours  norepinephrine Infusion 0.05 IV Continuous <Continuous>  PHENobarbital Injectable 60 IV Push daily  polyethylene glycol 3350 17 Oral two times a day  tamsulosin 0.4 Oral at bedtime  vancomycin  IVPB     vancomycin  IVPB 1000 IV Intermittent every 12 hours        ANTIBIOTICS:  meropenem  IVPB 1000 milliGRAM(s) IV Intermittent every 8 hours  vancomycin  IVPB      vancomycin  IVPB 1000 milliGRAM(s) IV Intermittent every 12 hours      ALLERGIES:  No Known Allergies

## 2021-06-19 NOTE — PROGRESS NOTE ADULT - ASSESSMENT
IMPRESSION:    Septic shock 2ry to obtructive pyelonephritis s/p Right PCN  Chronic suprapubic Edouard  HO seziures, ?seizure yesterday  GNR and GPC bacteremia      PLAN:    CNS: avoid CNS depressants. FU EEG. continue phenobarbito. Neuro FU    HEENT:  Oral care    PULMONARY:  HOB @ 45 degrees, aspiration precautions. Chest xray today. wean O2 as tolerated, Goal >92%    CARDIOVASCULAR: Lasix IV 40mg. D/C IV fluids.    GI: GI prophylaxis. start peg feeds                                           RENAL: F/u urine output, F/u IR, urology.    INFECTIOUS DISEASE: yovanny/vanc for now, ID eval.  F/u culture and sens    HEMATOLOGICAL:  DVT prophylaxis.    ENDOCRINE:  Follow up FS.  Insulin protocol if needed.    MUSCULOSKELETAL: HOB elevation    CODE STATUS: FULL CODE    DISPOSITION: Pt requires continued monitoring in the SDU    Attending attestation to follow IMPRESSION:    Septic shock 2ry to obtructive pyelonephritis s/p Right PCN  Chronic suprapubic Edouard  HO seziures, ?seizure yesterday  GNR and GPC bacteremia      PLAN:    CNS: avoid CNS depressants. FU EEG. continue phenobarbito. Neuro FU    HEENT:  Oral care    PULMONARY:  HOB @ 45 degrees, aspiration precautions. Chest xray today. wean O2 as tolerated, Goal >92%    CARDIOVASCULAR: Lasix IV 40mg. D/C IV fluids.    GI: GI prophylaxis. start peg feeds                                           RENAL: F/u urine output, F/u IR, urology.    INFECTIOUS DISEASE: yovanny/vanc for now, ID f/up  F/u culture and sens    HEMATOLOGICAL:  DVT prophylaxis.    ENDOCRINE:  Follow up FS.  Insulin protocol if needed.    MUSCULOSKELETAL: HOB elevation    CODE STATUS: FULL CODE    DISPOSITION: Pt requires continued monitoring in the SDU

## 2021-06-19 NOTE — PROGRESS NOTE ADULT - SUBJECTIVE AND OBJECTIVE BOX
Progress Note    Patient is a 66 yo Male s/p right PCN placement by IR with sepsis & hypotension. Pt seen and examined at bedside this morning. Pt still currently on bipap, asking when can he come off of it. Pt also admits he is hungry and asking when he can eat. Respiratory therapist at bedside reports pt will still need to be kept on bipap and monitored for now. Pt denies any fevers, N/V, abdominal pain or flank pain.       [ x ] a 10 point review of systems was negative except where noted        Vital signs  T(C): , Max: 35.9 (06-19-21 @ 08:32)  HR: 85 (06-19-21 @ 08:32)  BP: 131/61 (06-19-21 @ 08:32)  SpO2: 96% (06-19-21 @ 04:45)    Physical Exam  Gen: contracted, currently on bipap  HEENT: NC/AT  Respiratory: + BiPAP with 95-97% oxygen saturations   Back:   Abd        Labs                        12.3   19.63 )-----------( 141      ( 19 Jun 2021 07:11 )             36.3     19 Jun 2021 07:11    138    |  104    |  28     ----------------------------<  165    5.0     |  22     |  0.8      Ca    7.9        19 Jun 2021 07:11  Mg     3.0       19 Jun 2021 07:11                  Progress Note    Patient is a 66 yo Male s/p right PCN placement by IR with sepsis & hypotension. Pt seen and examined at bedside this morning. Pt still currently on bipap, asking when can he come off of it. Pt also admits he is hungry and asking when he can eat. Respiratory therapist at bedside reports pt will still need to be kept on bipap and monitored for now. Pt denies any fevers, N/V, abdominal pain or flank pain.       [ x ] a 10 point review of systems was negative except where noted      Vital signs  T(C): , Max: 35.9 (06-19-21 @ 08:32)  HR: 85 (06-19-21 @ 08:32)  BP: 131/61 (06-19-21 @ 08:32)  SpO2: 96% (06-19-21 @ 04:45)    Physical Exam  Gen: contracted, currently on bipap  HEENT: NC/AT  Respiratory: + BiPAP with 95-97% oxygen saturations   Back: no CVA tenderness bilaterally, + right PCN in place, dressing clean/dry/intact, draining dark merlot tinged urine   Abd: soft, nontender, mildly distended   : +26Fr suprapubic tube in place with clear yellow urine, minimal sediment noted   Ext: no edema   Skin: non diaphoretic         Labs                        12.3   19.63 )-----------( 141      ( 19 Jun 2021 07:11 )             36.3     19 Jun 2021 07:11    138    |  104    |  28     ----------------------------<  165    5.0     |  22     |  0.8      Ca    7.9        19 Jun 2021 07:11  Mg     3.0       19 Jun 2021 07:11      I&O's Detail    18 Jun 2021 07:01  -  19 Jun 2021 07:00  --------------------------------------------------------  IN:    Lactated Ringers: 100 mL    Norepinephrine: 20 mL  Total IN: 120 mL    OUT:    Nephrostomy Tube (mL): 440 mL    Stool (mL): 0 mL    Voided (mL): 150 mL  Total OUT: 590 mL    Total NET: -470 mL      19 Jun 2021 07:01  -  19 Jun 2021 09:17  --------------------------------------------------------  IN:    Lactated Ringers: 100 mL    Norepinephrine: 36 mL  Total IN: 136 mL    OUT:    Nephrostomy Tube (mL): 300 mL  Total OUT: 300 mL    Total NET: -164 mL                  Progress Note    Patient is a 66 yo Male s/p right PCN placement by IR with sepsis & hypotension. Pt seen and examined at bedside this morning. Pt still currently on bipap, asking when can he come off of it. Pt also admits he is hungry and asking when he can eat. Respiratory therapist at bedside reports pt will still need to be kept on bipap and monitored for now. Pt denies any fevers, N/V, abdominal pain or flank pain.       [ x ] a 10 point review of systems was negative except where noted      Vital signs  T(C): , Max: 35.9 (06-19-21 @ 08:32)  HR: 85 (06-19-21 @ 08:32)  BP: 131/61 (06-19-21 @ 08:32)  SpO2: 96% (06-19-21 @ 04:45)    Physical Exam  Gen: contracted, currently on bipap  HEENT: NC/AT  Respiratory: + BiPAP with 95-97% oxygen saturations   Back: no CVA tenderness bilaterally, + right PCN in place, dressing clean/dry/intact, draining dark merlot tinged urine   Abd: soft, nontender, mildly distended   : +26Fr suprapubic tube in place with clear yellow urine, minimal sediment noted   Ext: no edema   Skin: non diaphoretic         Labs                        12.3   19.63 )-----------( 141      ( 19 Jun 2021 07:11 )             36.3     19 Jun 2021 07:11    138    |  104    |  28     ----------------------------<  165    5.0     |  22     |  0.8      Ca    7.9        19 Jun 2021 07:11  Mg     3.0       19 Jun 2021 07:11    Blood Gas Arterial, Lactate (06.18.21 @ 18:15)    Blood Gas Arterial, Lactate: 1.5: BIPAP 14/8, RR 18, FIO2 70% mmoL/L    Lactate, Blood (06.19.21 @ 07:11)    Lactate, Blood: 2.0 mmol/L        I&O's Detail    18 Jun 2021 07:01  -  19 Jun 2021 07:00  --------------------------------------------------------  IN:    Lactated Ringers: 100 mL    Norepinephrine: 20 mL  Total IN: 120 mL    OUT:    Nephrostomy Tube (mL): 440 mL    Stool (mL): 0 mL    Voided (mL): 150 mL  Total OUT: 590 mL    Total NET: -470 mL      19 Jun 2021 07:01  -  19 Jun 2021 09:17  --------------------------------------------------------  IN:    Lactated Ringers: 100 mL    Norepinephrine: 36 mL  Total IN: 136 mL    OUT:    Nephrostomy Tube (mL): 300 mL  Total OUT: 300 mL    Total NET: -164 mL                  Progress Note    Patient is a 66 yo Male s/p right PCN placement by IR with sepsis & hypotension. Pt seen and examined at bedside this morning. Pt still currently on bipap, asking when can he come off of it. Pt also admits he is hungry and asking when he can eat. Respiratory therapist at bedside reports pt will still need to be kept on bipap and monitored for now. Pt denies any fevers, N/V, abdominal pain or flank pain.     [ x ] a 10 point review of systems was negative except where noted      Vital signs  T(C): , Max: 35.9 (06-19-21 @ 08:32)  HR: 85 (06-19-21 @ 08:32)  BP: 131/61 (06-19-21 @ 08:32)  SpO2: 96% (06-19-21 @ 04:45)    Physical Exam  Gen: contracted, currently on bipap  HEENT: NC/AT  Respiratory: + BiPAP with 95-97% oxygen saturations   Back: no CVA tenderness bilaterally, + right PCN in place, dressing clean/dry/intact, draining dark merlot tinged urine   Abd: soft, nontender, mildly distended   : +26Fr suprapubic tube in place with clear yellow urine, minimal sediment noted   Ext: no edema   Skin: non diaphoretic         Labs                        12.3   19.63 )-----------( 141      ( 19 Jun 2021 07:11 )             36.3     19 Jun 2021 07:11    138    |  104    |  28     ----------------------------<  165    5.0     |  22     |  0.8      Ca    7.9        19 Jun 2021 07:11  Mg     3.0       19 Jun 2021 07:11    Blood Gas Arterial, Lactate (06.18.21 @ 18:15)    Blood Gas Arterial, Lactate: 1.5: BIPAP 14/8, RR 18, FIO2 70% mmoL/L    Lactate, Blood (06.19.21 @ 07:11)    Lactate, Blood: 2.0 mmol/L        I&O's Detail    18 Jun 2021 07:01  -  19 Jun 2021 07:00  --------------------------------------------------------  IN:    Lactated Ringers: 100 mL    Norepinephrine: 20 mL  Total IN: 120 mL    OUT:    Nephrostomy Tube (mL): 440 mL    Stool (mL): 0 mL    Voided (mL): 150 mL  Total OUT: 590 mL    Total NET: -470 mL      19 Jun 2021 07:01  -  19 Jun 2021 09:17  --------------------------------------------------------  IN:    Lactated Ringers: 100 mL    Norepinephrine: 36 mL  Total IN: 136 mL    OUT:    Nephrostomy Tube (mL): 300 mL  Total OUT: 300 mL    Total NET: -164 mL

## 2021-06-19 NOTE — CONSULT NOTE ADULT - SUBJECTIVE AND OBJECTIVE BOX
Neurology Consult    Patient is a 65y old  Male who presents with a chief complaint of Nephrolithiasis (2021 09:34)      HPI:  64 years old Male with PMHx of Cerebral Palsy, Seizure disorder, spastic paraplegia s/p PEG, BPH, Hx of Nephrolithiasis s/p pcnl x 2 with known incompetent bladder neck s/p SPT presents to ED with Right sided flank pain. Patient seen and examined at bedside, History obtained from group home chart. Patient reports feeling flank pain in the afternoon, radiating to RLQ, 10/10 with associated nausea/vomiting. Patient had Right-sided PCNL . Denies any fever, chills, SOB, CP, dysuria, hematuria. SPT in place draining cloudy yellow urine.     In ED, vitals were WNL, Labs significant for elevated lactate 2.2, UA + for bacteruria, LKE +.  CT A/P shows Delayed right nephrogram. Moderate right hydroureteronephrosis secondary to an obstructing right proximal ureteral stone. Stone size is difficult to measure due to extensive artifact. This stone appears to measure at least 6 mm craniocaudally (max ). He also has a right upper pole stone. Patient received 8mg of Morphine, IVF, Zofran, toradol and cefepime in ED patient reports pain is the same 10/10. To be admitted under medicine.  (2021 01:02)      PAST MEDICAL & SURGICAL HISTORY:  BPH (benign prostatic hyperplasia)    Cerebral palsy    Seizure  last seizure &gt;10 years ago    Osteoporosis    Spastic quadriplegia    Urinary calculi    Urinary retention    Asthma    S/P percutaneous endoscopic gastrostomy (PEG) tube placement    H/O cystoscopy    Suprapubic catheter        FAMILY HISTORY:      Social History: (-) x 3    Allergies    No Known Allergies    Intolerances        MEDICATIONS  (STANDING):  bisacodyl Suppository 10 milliGRAM(s) Rectal daily  chlorhexidine 4% Liquid 1 Application(s) Topical <User Schedule>  enoxaparin Injectable 40 milliGRAM(s) SubCutaneous daily  lactated ringers Bolus 1000 milliLiter(s) IV Bolus once  lactated ringers. 1000 milliLiter(s) (100 mL/Hr) IV Continuous <Continuous>  lactated ringers. 1000 milliLiter(s) (2000 mL/Hr) IV Continuous <Continuous>  lactated ringers. 1000 milliLiter(s) (1000 mL/Hr) IV Continuous <Continuous>  lactulose Syrup 10 Gram(s) Oral daily  linezolid  IVPB      methylPREDNISolone sodium succinate Injectable 40 milliGRAM(s) IV Push every 8 hours  norepinephrine Infusion 0.05 MICROgram(s)/kG/Min (5.38 mL/Hr) IV Continuous <Continuous>  PHENobarbital Injectable 60 milliGRAM(s) IV Push daily  piperacillin/tazobactam IVPB. 4.5 Gram(s) IV Intermittent once  piperacillin/tazobactam IVPB.. 4.5 Gram(s) IV Intermittent every 8 hours  polyethylene glycol 3350 17 Gram(s) Oral two times a day  tamsulosin 0.4 milliGRAM(s) Oral at bedtime    MEDICATIONS  (PRN):  ALBUTerol    90 MICROgram(s) HFA Inhaler 2 Puff(s) Inhalation every 6 hours PRN Bronchospasm  morphine  - Injectable 4 milliGRAM(s) IV Push every 6 hours PRN Moderate Pain (4 - 6)      Vital Signs Last 24 Hrs  T(C): 35.9 (2021 08:32), Max: 35.9 (2021 08:32)  T(F): 96.6 (2021 08:32), Max: 96.6 (2021 08:32)  HR: 93 (2021 09:00) (85 - 100)  BP: 105/66 (2021 09:00) (105/66 - 131/61)  BP(mean): 79 (2021 09:00) (79 - 88)  RR: 31 (2021 08:32) (31 - 31)  SpO2: 97% (2021 08:00) (95% - 98%)    Examination:  Patient mechanically vented  Awake alert oriented to self and place  Patient answering questions appropriately  Moves all extremities spontaneously  Patients extremities with increased tone and spasticity chronically    Labs:   CBC Full  -  ( 2021 07:11 )  WBC Count : 19.63 K/uL  RBC Count : 3.94 M/uL  Hemoglobin : 12.3 g/dL  Hematocrit : 36.3 %  Platelet Count - Automated : 141 K/uL  Mean Cell Volume : 92.1 fL  Mean Cell Hemoglobin : 31.2 pg  Mean Cell Hemoglobin Concentration : 33.9 g/dL  Auto Neutrophil # : 17.92 K/uL  Auto Lymphocyte # : 0.61 K/uL  Auto Monocyte # : 0.95 K/uL  Auto Eosinophil # : 0.00 K/uL  Auto Basophil # : 0.02 K/uL  Auto Neutrophil % : 91.3 %  Auto Lymphocyte % : 3.1 %  Auto Monocyte % : 4.8 %  Auto Eosinophil % : 0.0 %  Auto Basophil % : 0.1 %        138  |  104  |  28<H>  ----------------------------<  165<H>  5.0   |  22  |  0.8    Ca    7.9<L>      2021 07:11  Mg     3.0         TPro  5.1<L>  /  Alb  2.8<L>  /  TBili  0.3  /  DBili  x   /  AST  24  /  ALT  14  /  AlkPhos  86      LIVER FUNCTIONS - ( 2021 07:11 )  Alb: 2.8 g/dL / Pro: 5.1 g/dL / ALK PHOS: 86 U/L / ALT: 14 U/L / AST: 24 U/L / GGT: x           PT/INR - ( 2021 09:29 )   PT: 14.90 sec;   INR: 1.30 ratio         PTT - ( 2021 09:29 )  PTT:30.2 sec  Urinalysis Basic - ( 2021 20:30 )    Color: Dark Brown / Appearance: Turbid / S.024 / pH: x  Gluc: x / Ketone: Negative  / Bili: Negative / Urobili: <2 mg/dL   Blood: x / Protein: 300 mg/dL / Nitrite: Negative   Leuk Esterase: Moderate / RBC: >720 /HPF / WBC 10 /HPF   Sq Epi: x / Non Sq Epi: 13 /HPF / Bacteria: Negative

## 2021-06-19 NOTE — PROGRESS NOTE ADULT - ASSESSMENT
66 yo Male s/p right PCN placement by IR with sepsis & hypotension - pt still on levophed & BiPAP    Plan:  ·	continue to monitor right PCN output  ·	f/u urine cultures taken intraoperative   ·	continue IV antibiotics as per ID recommendations   ·	continue SP tube care  ·	will discuss with attending    64 yo Male s/p right PCN placement by IR with sepsis & hypotension - pt still on levophed & BiPAP    Plan:  ·	continue to monitor right PCN output  ·	f/u urine cultures taken intraoperative   ·	continue IV antibiotics  ·	ID recommendations appreciated, currently on zosyn & linezolid  ·	continue SP tube care  ·	monitor VS   ·	IV fluids   ·	will discuss with attending    66 yo Male s/p right PCN placement by IR with sepsis & hypotension - pt still on levophed & BiPAP    Plan:  ·	continue to monitor right PCN output  ·	f/u urine cultures taken intraoperative   ·	continue IV antibiotics  ·	ID recommendations appreciated, currently on zosyn & linezolid  ·	continue SP tube care  ·	monitor VS   ·	IV fluids   ·	Cr stable, 0.8  ·	will discuss with attending    66 yo Male s/p right PCN placement by IR with sepsis & hypotension - pt still on levophed & BiPAP    Plan:  ·	continue to monitor right PCN output  ·	f/u urine cultures taken intraoperative   ·	continue IV antibiotics  ·	ID recommendations appreciated, currently on zosyn & linezolid  ·	continue SP tube care  ·	monitor VS   ·	IV fluids   ·	Cr stable, 0.8  ·	lactate improved 4.3>>1.5  ·	will discuss with attending    66 yo Male s/p right PCN placement by IR with sepsis & hypotension - pt still on levophed & BiPAP    Plan:  ·	continue to monitor right PCN output  ·	f/u urine cultures taken intraoperative   ·	continue IV antibiotics  ·	ID recommendations appreciated, currently on zosyn & linezolid  ·	continue SP tube care  ·	monitor VS   ·	IV fluids   ·	Cr stable, 0.8  ·	lactate improved 4.3>>1.5  ·	will discuss with attending       case discussed with PA staff real time note reviewed and signed now  no acute gu changes overnight and until he has stabilized there will be little other than antimicrobials and catheter drainage  once improved go to PCNU and then at a later date PCNL of renal and ureteral stone  ? nephrologic involvement for stone prevention as he keeps going through this and keeps getting septic

## 2021-06-19 NOTE — PROGRESS NOTE ADULT - SUBJECTIVE AND OBJECTIVE BOX
Patient is a 65y old  Male who presents with a chief complaint of Nephrolithiasis (2021 17:04)        Over Night Events:  Rapid response in IR  seizure like activity      ROS:     All ROS are negative except HPI         PHYSICAL EXAM    ICU Vital Signs Last 24 Hrs  T(C): 35.9 (2021 08:32), Max: 35.9 (2021 08:32)  T(F): 96.6 (2021 08:32), Max: 96.6 (2021 08:32)  HR: 85 (2021 08:32) (85 - 100)  BP: 131/61 (2021 08:32) (108/59 - 131/61)  BP(mean): 88 (2021 08:32) (88 - 88)  ABP: --  ABP(mean): --  RR: 31 (2021 08:32) (31 - 31)  SpO2: 96% (2021 04:45) (95% - 98%)      CONSTITUTIONAL:   NAD    ENT:   Airway patent,   Mouth with normal mucosa.   No thrush  on HFNCo2 60/60    EYES:   Pupils equal,   Round and reactive to light.    CARDIAC:   Normal rate,   Regular rhythm.    No edema    RESPIRATORY:   No wheezing  Bilateral BS  Normal chest expansion  Not tachypneic,  No use of accessory muscles    GASTROINTESTINAL:  Abdomen soft,   Non-tender,   No guarding,     MUSCULOSKELETAL:   Range of motion is not limited,  No clubbing, cyanosis    NEUROLOGICAL:   Alert awake  follows commands  contracted    SKIN:   Skin normal color for race,   Warm and dry and intact.   No evidence of rash.    PSYCHIATRIC:   Normal mood and affect.   No apparent risk to self or others.    HEMATOLOGICAL:  No cervical  lymphadenopathy.  no inguinal lymphadenopathy      21 @ 07:01  -  - @ 07:00  --------------------------------------------------------  IN:  Total IN: 0 mL    OUT:    Nephrostomy Tube (mL): 440 mL    Stool (mL): 0 mL    Voided (mL): 150 mL  Total OUT: 590 mL    Total NET: -590 mL          LABS:                            12.3   19.63 )-----------( 141      ( 2021 07:11 )             36.3                                               06-19    138  |  104  |  28<H>  ----------------------------<  165<H>  5.0   |  22  |  0.8    Ca    7.9<L>      2021 07:11  Mg     3.0     06-19    TPro  5.1<L>  /  Alb  2.8<L>  /  TBili  0.3  /  DBili  x   /  AST  24  /  ALT  14  /  AlkPhos  86  06-19      PT/INR - ( 2021 09:29 )   PT: 14.90 sec;   INR: 1.30 ratio         PTT - ( 2021 09:29 )  PTT:30.2 sec                                       Urinalysis Basic - ( 2021 20:30 )    Color: Dark Brown / Appearance: Turbid / S.024 / pH: x  Gluc: x / Ketone: Negative  / Bili: Negative / Urobili: <2 mg/dL   Blood: x / Protein: 300 mg/dL / Nitrite: Negative   Leuk Esterase: Moderate / RBC: >720 /HPF / WBC 10 /HPF   Sq Epi: x / Non Sq Epi: 13 /HPF / Bacteria: Negative        CARDIAC MARKERS ( 2021 07:11 )  x     / 0.03 ng/mL / x     / x     / x      CARDIAC MARKERS ( 2021 01:49 )  x     / 0.05 ng/mL / x     / x     / x      CARDIAC MARKERS ( 2021 20:18 )  x     / 0.13 ng/mL / 149 U/L / x     / 4.0 ng/mL  CARDIAC MARKERS ( 2021 09:29 )  x     / 0.01 ng/mL / x     / x     / x                                                LIVER FUNCTIONS - ( 2021 07:11 )  Alb: 2.8 g/dL / Pro: 5.1 g/dL / ALK PHOS: 86 U/L / ALT: 14 U/L / AST: 24 U/L / GGT: x                                                  Culture - Blood (collected 2021 09:29)  Source: .Blood None  Gram Stain (2021 07:48):    Upon re-evaluation of gram stain:    Growth in aerobic and anaerobic bottles: Gram Negative Rods and Gram    Positive Cocci in Pairs and Chains  Preliminary Report (2021 07:48):    Growth in aerobic and anaerobic bottles: Gram Positive Cocci in Pairs and    Chains and Gram Negative Rods    ***Blood Panel PCR results on this specimen are available    approximately 3 hours after the Gram stain result.***    Gram stain, PCR, and/or culture results may not always    correspond due to difference in methodologies.    ************************************************************    This PCR assay was performed by multiplex PCR. This    Assay tests for 66 bacterial and resistance gene targets.    Please refer to the [facility] Labs test directory    at [email or web address] for details.  Organism: Blood Culture PCR  Blood Culture PCR (2021 07:52)  Organism: Blood Culture PCR (2021 07:52)  Organism: Blood Culture PCR (2021 07:34)                                                                                       ABG - ( 2021 18:15 )  pH, Arterial: 7.39  pH, Blood: x     /  pCO2: 44    /  pO2: 112   / HCO3: 27    / Base Excess: 1.2   /  SaO2: 98                  MEDICATIONS  (STANDING):  bisacodyl Suppository 10 milliGRAM(s) Rectal daily  chlorhexidine 4% Liquid 1 Application(s) Topical <User Schedule>  enoxaparin Injectable 40 milliGRAM(s) SubCutaneous daily  lactated ringers Bolus 1000 milliLiter(s) IV Bolus once  lactated ringers. 1000 milliLiter(s) (100 mL/Hr) IV Continuous <Continuous>  lactated ringers. 1000 milliLiter(s) (2000 mL/Hr) IV Continuous <Continuous>  lactated ringers. 1000 milliLiter(s) (1000 mL/Hr) IV Continuous <Continuous>  lactulose Syrup 10 Gram(s) Oral daily  meropenem  IVPB 1000 milliGRAM(s) IV Intermittent every 8 hours  methylPREDNISolone sodium succinate Injectable 40 milliGRAM(s) IV Push every 8 hours  norepinephrine Infusion 0.05 MICROgram(s)/kG/Min (5.38 mL/Hr) IV Continuous <Continuous>  PHENobarbital Injectable 60 milliGRAM(s) IV Push daily  polyethylene glycol 3350 17 Gram(s) Oral two times a day  tamsulosin 0.4 milliGRAM(s) Oral at bedtime  vancomycin  IVPB      vancomycin  IVPB 1000 milliGRAM(s) IV Intermittent every 12 hours    MEDICATIONS  (PRN):  ALBUTerol    90 MICROgram(s) HFA Inhaler 2 Puff(s) Inhalation every 6 hours PRN Bronchospasm  morphine  - Injectable 4 milliGRAM(s) IV Push every 6 hours PRN Moderate Pain (4 - 6)      New X-rays reviewed:                                                                                  ECHO    CXR interpreted by me:       Patient is a 65y old  Male who presents with a chief complaint of Nephrolithiasis (2021 17:04)        Over Night Events:    Rapid response in IR, hypotensive, altered MS  seizure like activity        PHYSICAL EXAM    ICU Vital Signs Last 24 Hrs  T(C): 35.9 (2021 08:32), Max: 35.9 (2021 08:32)  T(F): 96.6 (2021 08:32), Max: 96.6 (2021 08:32)  HR: 85 (2021 08:32) (85 - 100)  BP: 131/61 (2021 08:32) (108/59 - 131/61)  BP(mean): 88 (2021 08:32) (88 - 88)  RR: 31 (2021 08:32) (31 - 31)  SpO2: 96% (2021 04:45) (95% - 98%)      CONSTITUTIONAL:  ill looking    ENT:   Airway patent,   Mouth with normal mucosa.   No thrush  on HFNCo2 60/60    CARDIAC:   Normal rate,   Regular rhythm.    No edema    RESPIRATORY:   No wheezing  Bilateral BS  Normal chest expansion  Not tachypneic,  No use of accessory muscles    GASTROINTESTINAL:  Abdomen soft,   Non-tender,   No guarding,     MUSCULOSKELETAL:   Range of motion is not limited,  No clubbing, cyanosis    NEUROLOGICAL:   Alert awake  follows commands  contracted          21 @ 07:01  -  21 @ 07:00  --------------------------------------------------------  IN:  Total IN: 0 mL    OUT:    Nephrostomy Tube (mL): 440 mL    Stool (mL): 0 mL    Voided (mL): 150 mL  Total OUT: 590 mL    Total NET: -590 mL          LABS:                            12.3   19.63 )-----------( 141      ( 2021 07:11 )             36.3                                                   138  |  104  |  28<H>  ----------------------------<  165<H>  5.0   |  22  |  0.8    Ca    7.9<L>      2021 07:11  Mg     3.0     06-    TPro  5.1<L>  /  Alb  2.8<L>  /  TBili  0.3  /  DBili  x   /  AST  24  /  ALT  14  /  AlkPhos  86  06-      PT/INR - ( 2021 09:29 )   PT: 14.90 sec;   INR: 1.30 ratio         PTT - ( 2021 09:29 )  PTT:30.2 sec                                       Urinalysis Basic - ( 2021 20:30 )    Color: Dark Brown / Appearance: Turbid / S.024 / pH: x  Gluc: x / Ketone: Negative  / Bili: Negative / Urobili: <2 mg/dL   Blood: x / Protein: 300 mg/dL / Nitrite: Negative   Leuk Esterase: Moderate / RBC: >720 /HPF / WBC 10 /HPF   Sq Epi: x / Non Sq Epi: 13 /HPF / Bacteria: Negative        CARDIAC MARKERS ( 2021 07:11 )  x     / 0.03 ng/mL / x     / x     / x      CARDIAC MARKERS ( 2021 01:49 )  x     / 0.05 ng/mL / x     / x     / x      CARDIAC MARKERS ( 2021 20:18 )  x     / 0.13 ng/mL / 149 U/L / x     / 4.0 ng/mL  CARDIAC MARKERS ( 2021 09:29 )  x     / 0.01 ng/mL / x     / x     / x                                                LIVER FUNCTIONS - ( 2021 07:11 )  Alb: 2.8 g/dL / Pro: 5.1 g/dL / ALK PHOS: 86 U/L / ALT: 14 U/L / AST: 24 U/L / GGT: x                                                  Culture - Blood (collected 2021 09:29)  Source: .Blood None  Gram Stain (2021 07:48):    Upon re-evaluation of gram stain:    Growth in aerobic and anaerobic bottles: Gram Negative Rods and Gram    Positive Cocci in Pairs and Chains  Preliminary Report (2021 07:48):    Growth in aerobic and anaerobic bottles: Gram Positive Cocci in Pairs and    Chains and Gram Negative Rods    ***Blood Panel PCR results on this specimen are available    approximately 3 hours after the Gram stain result.***    Gram stain, PCR, and/or culture results may not always    correspond due to difference in methodologies.    ************************************************************    This PCR assay was performed by multiplex PCR. This    Assay tests for 66 bacterial and resistance gene targets.    Please refer to the [facility] Labs test directory    at [email or web address] for details.  Organism: Blood Culture PCR  Blood Culture PCR (2021 07:52)  Organism: Blood Culture PCR (2021 07:52)  Organism: Blood Culture PCR (2021 07:34)                                                                                       ABG - ( 2021 18:15 )  pH, Arterial: 7.39  pH, Blood: x     /  pCO2: 44    /  pO2: 112   / HCO3: 27    / Base Excess: 1.2   /  SaO2: 98                  MEDICATIONS  (STANDING):  bisacodyl Suppository 10 milliGRAM(s) Rectal daily  chlorhexidine 4% Liquid 1 Application(s) Topical <User Schedule>  enoxaparin Injectable 40 milliGRAM(s) SubCutaneous daily  lactated ringers Bolus 1000 milliLiter(s) IV Bolus once  lactated ringers. 1000 milliLiter(s) (100 mL/Hr) IV Continuous <Continuous>  lactated ringers. 1000 milliLiter(s) (2000 mL/Hr) IV Continuous <Continuous>  lactated ringers. 1000 milliLiter(s) (1000 mL/Hr) IV Continuous <Continuous>  lactulose Syrup 10 Gram(s) Oral daily  meropenem  IVPB 1000 milliGRAM(s) IV Intermittent every 8 hours  methylPREDNISolone sodium succinate Injectable 40 milliGRAM(s) IV Push every 8 hours  norepinephrine Infusion 0.05 MICROgram(s)/kG/Min (5.38 mL/Hr) IV Continuous <Continuous>  PHENobarbital Injectable 60 milliGRAM(s) IV Push daily  polyethylene glycol 3350 17 Gram(s) Oral two times a day  tamsulosin 0.4 milliGRAM(s) Oral at bedtime  vancomycin  IVPB      vancomycin  IVPB 1000 milliGRAM(s) IV Intermittent every 12 hours    MEDICATIONS  (PRN):  ALBUTerol    90 MICROgram(s) HFA Inhaler 2 Puff(s) Inhalation every 6 hours PRN Bronchospasm  morphine  - Injectable 4 milliGRAM(s) IV Push every 6 hours PRN Moderate Pain (4 - 6)      New X-rays reviewed:                                                                                  ECHO    CXR interpreted by me:

## 2021-06-19 NOTE — CHART NOTE - NSCHARTNOTEFT_GEN_A_CORE
The patient is to be under step down unit. Per AND cannot stay under med surg if receiving levophed ( which was ordered on 6.18). Spoke to fellow, will change to step down. No change in patient's status/ logistics. The patient is to be under step down unit. Per AND cannot stay under med surg if receiving levophed ( which was ordered on 6.18). Spoke to fellow, will change to step down. No change in patient's status- logistics/staffing.

## 2021-06-19 NOTE — CONSULT NOTE ADULT - ASSESSMENT
ASSESSMENT  64 years old Male with PMHx of Cerebral Palsy, Seizure disorder, spastic paraplegia s/p PEG, BPH, Hx of Nephrolithiasis s/p pcnl x 2 with known incompetent bladder neck s/p SPT presents to ED with Right sided flank pain. Patient had Right-sided PCNL 5/20.    IMPRESSION  #Septic shock requiring pressors secondary to Polymicrobial bacteremia with Pseudomonas and VRE secondary to pyelonephritis secondary to obstructing stone    s/p SPT exchange and R PCN    6/18 BCX Pseudomonas, VRE    UA  WBC 10     CTAP 1.  Obstructing proximal right ureteral stone with moderate hydroureteronephrosis.  2.  Bladder wall thickening. Correlate with urinalysis.  #CT Bibasilar atelectasis.  Creatinine, Serum: 0.8 (06-19-21 @ 07:11)    Weight (kg): 57.4 (06-18-21 @ 05:00)    RECOMMENDATIONS  This is an incomplete consult note. All final recommendations to follow after interview and examination of the patient. Please follow recommendations noted below.  - D/C VANC, VRE- Start linezolid 600mg q12h IV, if VRE is S to ampicillin, then D/C  - D/C meropenem--> Zosyn 4.5 q8h IV (pseudomonal dosing), f/u sensitivities   - if worsening hemodynamic compromise, dose amikacin 5mg/kg x1  - Repeat BCX  - F/u UCX  - Appreciate Urology consult- s/p SPT exchange 6/18  - Appreciate IR consult: s/p Percutaneous right nephrostomy    If any questions, please call or send a message on Onsite Care Teams  Please continue to update ID with any pertinent new laboratory or radiographic findings  Spectra [phone]     ASSESSMENT  64 years old Male with PMHx of Cerebral Palsy, Seizure disorder, spastic paraplegia s/p PEG, BPH, Hx of Nephrolithiasis s/p pcnl x 2 with known incompetent bladder neck s/p SPT presents to ED with Right sided flank pain. Patient had Right-sided PCNL 5/20.    IMPRESSION  #Septic shock requiring pressors secondary to Polymicrobial bacteremia with Pseudomonas and VRE secondary to pyelonephritis secondary to obstructing stone    s/p SPT exchange and R PCN    6/18 BCX Pseudomonas, VRE    UA  WBC 10     CTAP 1.  Obstructing proximal right ureteral stone with moderate hydroureteronephrosis.  2.  Bladder wall thickening. Correlate with urinalysis.  #CT Bibasilar atelectasis.  Creatinine, Serum: 0.8 (06-19-21 @ 07:11)    Weight (kg): 57.4 (06-18-21 @ 05:00)    RECOMMENDATIONS  - D/C VANC, VRE- Start linezolid 600mg q12h IV, if VRE is S to ampicillin, then D/C  - D/C meropenem--> Zosyn 4.5 q8h IV (pseudomonal dosing), f/u sensitivities   - if worsening hemodynamic compromise, dose amikacin 5mg/kg x1  - Repeat BCX  - F/u UCX  - Appreciate Urology consult- s/p SPT exchange 6/18  - Appreciate IR consult: s/p Percutaneous right nephrostomy    If any questions, please call or send a message on [employer] Teams  Please continue to update ID with any pertinent new laboratory or radiographic findings  Spectra [phone]

## 2021-06-19 NOTE — CONSULT NOTE ADULT - ASSESSMENT
Impression:  64 years old Male with PMHx of Cerebral Palsy, Seizure disorder, spastic paraplegia s/p PEG, BPH, Hx of Nephrolithiasis s/p pcnl x 2 with known incompetent bladder neck s/p SPT presents to ED with Right sided flank pain. Treated for bacteremia. Neurology contacted for episode of less responsiveness in the presence of hypotension.  Currently awake and alert and reports not having a seizure for several years. Does not remember the event.     Suggestion:  Routine EEG  Keep magnesium >2  Seizure precautions      Kwame Roche NP  [id number]

## 2021-06-20 LAB
-  AMIKACIN: SIGNIFICANT CHANGE UP
-  AZTREONAM: SIGNIFICANT CHANGE UP
-  CEFEPIME: SIGNIFICANT CHANGE UP
-  CEFTAZIDIME: SIGNIFICANT CHANGE UP
-  CIPROFLOXACIN: SIGNIFICANT CHANGE UP
-  GENTAMICIN: SIGNIFICANT CHANGE UP
-  IMIPENEM: SIGNIFICANT CHANGE UP
-  LEVOFLOXACIN: SIGNIFICANT CHANGE UP
-  MEROPENEM: SIGNIFICANT CHANGE UP
-  PIPERACILLIN/TAZOBACTAM: SIGNIFICANT CHANGE UP
-  TOBRAMYCIN: SIGNIFICANT CHANGE UP
ALBUMIN SERPL ELPH-MCNC: 3.2 G/DL — LOW (ref 3.5–5.2)
ALP SERPL-CCNC: 84 U/L — SIGNIFICANT CHANGE UP (ref 30–115)
ALT FLD-CCNC: 17 U/L — SIGNIFICANT CHANGE UP (ref 0–41)
ANION GAP SERPL CALC-SCNC: 12 MMOL/L — SIGNIFICANT CHANGE UP (ref 7–14)
AST SERPL-CCNC: 25 U/L — SIGNIFICANT CHANGE UP (ref 0–41)
BASOPHILS # BLD AUTO: 0.01 K/UL — SIGNIFICANT CHANGE UP (ref 0–0.2)
BASOPHILS NFR BLD AUTO: 0.1 % — SIGNIFICANT CHANGE UP (ref 0–1)
BILIRUB SERPL-MCNC: 0.2 MG/DL — SIGNIFICANT CHANGE UP (ref 0.2–1.2)
BUN SERPL-MCNC: 22 MG/DL — HIGH (ref 10–20)
CALCIUM SERPL-MCNC: 8.1 MG/DL — LOW (ref 8.5–10.1)
CHLORIDE SERPL-SCNC: 99 MMOL/L — SIGNIFICANT CHANGE UP (ref 98–110)
CO2 SERPL-SCNC: 27 MMOL/L — SIGNIFICANT CHANGE UP (ref 17–32)
CREAT SERPL-MCNC: 0.7 MG/DL — SIGNIFICANT CHANGE UP (ref 0.7–1.5)
EOSINOPHIL # BLD AUTO: 0 K/UL — SIGNIFICANT CHANGE UP (ref 0–0.7)
EOSINOPHIL NFR BLD AUTO: 0 % — SIGNIFICANT CHANGE UP (ref 0–8)
GLUCOSE SERPL-MCNC: 248 MG/DL — HIGH (ref 70–99)
HCT VFR BLD CALC: 35.6 % — LOW (ref 42–52)
HGB BLD-MCNC: 12.1 G/DL — LOW (ref 14–18)
IMM GRANULOCYTES NFR BLD AUTO: 0.6 % — HIGH (ref 0.1–0.3)
LACTATE SERPL-SCNC: 1.7 MMOL/L — SIGNIFICANT CHANGE UP (ref 0.7–2)
LYMPHOCYTES # BLD AUTO: 0.26 K/UL — LOW (ref 1.2–3.4)
LYMPHOCYTES # BLD AUTO: 2.6 % — LOW (ref 20.5–51.1)
MAGNESIUM SERPL-MCNC: 2.2 MG/DL — SIGNIFICANT CHANGE UP (ref 1.8–2.4)
MCHC RBC-ENTMCNC: 30.5 PG — SIGNIFICANT CHANGE UP (ref 27–31)
MCHC RBC-ENTMCNC: 34 G/DL — SIGNIFICANT CHANGE UP (ref 32–37)
MCV RBC AUTO: 89.7 FL — SIGNIFICANT CHANGE UP (ref 80–94)
METHOD TYPE: SIGNIFICANT CHANGE UP
MONOCYTES # BLD AUTO: 0.28 K/UL — SIGNIFICANT CHANGE UP (ref 0.1–0.6)
MONOCYTES NFR BLD AUTO: 2.8 % — SIGNIFICANT CHANGE UP (ref 1.7–9.3)
NEUTROPHILS # BLD AUTO: 9.54 K/UL — HIGH (ref 1.4–6.5)
NEUTROPHILS NFR BLD AUTO: 93.9 % — HIGH (ref 42.2–75.2)
NRBC # BLD: 0 /100 WBCS — SIGNIFICANT CHANGE UP (ref 0–0)
PLATELET # BLD AUTO: 104 K/UL — LOW (ref 130–400)
POTASSIUM SERPL-MCNC: 3.7 MMOL/L — SIGNIFICANT CHANGE UP (ref 3.5–5)
POTASSIUM SERPL-SCNC: 3.7 MMOL/L — SIGNIFICANT CHANGE UP (ref 3.5–5)
PROT SERPL-MCNC: 5.8 G/DL — LOW (ref 6–8)
RBC # BLD: 3.97 M/UL — LOW (ref 4.7–6.1)
RBC # FLD: 12.9 % — SIGNIFICANT CHANGE UP (ref 11.5–14.5)
SODIUM SERPL-SCNC: 138 MMOL/L — SIGNIFICANT CHANGE UP (ref 135–146)
WBC # BLD: 10.15 K/UL — SIGNIFICANT CHANGE UP (ref 4.8–10.8)
WBC # FLD AUTO: 10.15 K/UL — SIGNIFICANT CHANGE UP (ref 4.8–10.8)

## 2021-06-20 PROCEDURE — 71045 X-RAY EXAM CHEST 1 VIEW: CPT | Mod: 26

## 2021-06-20 PROCEDURE — 95816 EEG AWAKE AND DROWSY: CPT | Mod: 26

## 2021-06-20 PROCEDURE — 99232 SBSQ HOSP IP/OBS MODERATE 35: CPT

## 2021-06-20 PROCEDURE — 99233 SBSQ HOSP IP/OBS HIGH 50: CPT

## 2021-06-20 RX ORDER — FUROSEMIDE 40 MG
40 TABLET ORAL DAILY
Refills: 0 | Status: DISCONTINUED | OUTPATIENT
Start: 2021-06-20 | End: 2021-06-24

## 2021-06-20 RX ADMIN — Medication 40 MILLIGRAM(S): at 14:28

## 2021-06-20 RX ADMIN — Medication 60 MILLIGRAM(S): at 18:41

## 2021-06-20 RX ADMIN — Medication 40 MILLIGRAM(S): at 06:28

## 2021-06-20 RX ADMIN — POLYETHYLENE GLYCOL 3350 17 GRAM(S): 17 POWDER, FOR SOLUTION ORAL at 18:42

## 2021-06-20 RX ADMIN — TAMSULOSIN HYDROCHLORIDE 0.4 MILLIGRAM(S): 0.4 CAPSULE ORAL at 22:00

## 2021-06-20 RX ADMIN — LINEZOLID 300 MILLIGRAM(S): 600 INJECTION, SOLUTION INTRAVENOUS at 18:43

## 2021-06-20 RX ADMIN — POLYETHYLENE GLYCOL 3350 17 GRAM(S): 17 POWDER, FOR SOLUTION ORAL at 06:29

## 2021-06-20 RX ADMIN — Medication 40 MILLIGRAM(S): at 18:44

## 2021-06-20 RX ADMIN — Medication 40 MILLIGRAM(S): at 14:29

## 2021-06-20 RX ADMIN — PIPERACILLIN AND TAZOBACTAM 25 GRAM(S): 4; .5 INJECTION, POWDER, LYOPHILIZED, FOR SOLUTION INTRAVENOUS at 21:51

## 2021-06-20 RX ADMIN — PIPERACILLIN AND TAZOBACTAM 25 GRAM(S): 4; .5 INJECTION, POWDER, LYOPHILIZED, FOR SOLUTION INTRAVENOUS at 14:29

## 2021-06-20 RX ADMIN — CHLORHEXIDINE GLUCONATE 1 APPLICATION(S): 213 SOLUTION TOPICAL at 06:28

## 2021-06-20 RX ADMIN — ENOXAPARIN SODIUM 40 MILLIGRAM(S): 100 INJECTION SUBCUTANEOUS at 14:28

## 2021-06-20 RX ADMIN — LINEZOLID 300 MILLIGRAM(S): 600 INJECTION, SOLUTION INTRAVENOUS at 06:28

## 2021-06-20 RX ADMIN — PIPERACILLIN AND TAZOBACTAM 25 GRAM(S): 4; .5 INJECTION, POWDER, LYOPHILIZED, FOR SOLUTION INTRAVENOUS at 06:28

## 2021-06-20 NOTE — PROGRESS NOTE ADULT - ASSESSMENT
ASSESSMENT  64 years old Male with PMHx of Cerebral Palsy, Seizure disorder, spastic paraplegia s/p PEG, BPH, Hx of Nephrolithiasis s/p pcnl x 2 with known incompetent bladder neck s/p SPT presents to ED with Right sided flank pain. Patient had Right-sided PCNL 5/20.    IMPRESSION  #Septic shock requiring pressors secondary to Polymicrobial bacteremia with Pseudomonas and VRE secondary to pyelonephritis secondary to obstructing stone    s/p SPT exchange and R PCN    6/18 UCX   Numerous Pseudomonas aeruginosa    6/18 BCX Pseudomonas, Enterococcus faecalis VRE    UA  WBC 10     CTAP 1.  Obstructing proximal right ureteral stone with moderate hydroureteronephrosis.  2.  Bladder wall thickening. Correlate with urinalysis.  #CT Bibasilar atelectasis.  Creatinine, Serum: 0.8 (06-19-21 @ 07:11)    Weight (kg): 57.4 (06-18-21 @ 05:00)    RECOMMENDATIONS  - linezolid 600mg q12h IV, if VRE is S to ampicillin, then D/C  - Zosyn 4.5 q8h IV (pseudomonal dosing), f/u sensitivities   - Repeat BCX  - F/u UCX  - Appreciate Urology consult- s/p SPT exchange 6/18  - Appreciate IR consult: s/p Percutaneous right nephrostomy    If any questions, please call or send a message on [employer] Teams  Please continue to update ID with any pertinent new laboratory or radiographic findings  Spectra [phone]

## 2021-06-20 NOTE — PROGRESS NOTE ADULT - SUBJECTIVE AND OBJECTIVE BOX
Hospital Day:  3d    Subjective: Patient is a 65y old  Male who presents with a chief complaint of Nephrolithiasis (2021 10:03)      Pt seen and evaluated at bedside.   Complaints:  Over the night Events:    Past Medical Hx:   BPH (benign prostatic hyperplasia)    Cerebral palsy    Seizure    Osteoporosis    Spastic quadriplegia    Urinary calculi    Urinary retention    Asthma      Past Sx:  S/P percutaneous endoscopic gastrostomy (PEG) tube placement    H/O cystoscopy    History of suprapubic catheter    Suprapubic catheter      Allergies:  No Known Allergies    Current Meds:   Standng Meds:  chlorhexidine 4% Liquid 1 Application(s) Topical <User Schedule>  enoxaparin Injectable 40 milliGRAM(s) SubCutaneous daily  linezolid  IVPB      linezolid  IVPB 600 milliGRAM(s) IV Intermittent every 12 hours  methylPREDNISolone sodium succinate Injectable 40 milliGRAM(s) IV Push every 8 hours  PHENobarbital Injectable 60 milliGRAM(s) IV Push daily  piperacillin/tazobactam IVPB.. 4.5 Gram(s) IV Intermittent every 8 hours  polyethylene glycol 3350 17 Gram(s) Oral two times a day  tamsulosin 0.4 milliGRAM(s) Oral at bedtime    PRN Meds:  ALBUTerol    90 MICROgram(s) HFA Inhaler 2 Puff(s) Inhalation every 6 hours PRN Bronchospasm  morphine  - Injectable 4 milliGRAM(s) IV Push every 6 hours PRN Moderate Pain (4 - 6)      Vital Signs:   T(F): 98 (21 @ 08:31), Max: 98.6 (21 @ 03:42)  HR: 102 (21 @ 08:31) (83 - 102)  BP: 118/64 (21 @ 08:31) (90/- - 119/74)  RR: 18 (21 @ 08:31) (18 - 98)  SpO2: 99% (21 @ 00:53) (93% - 99%)    Physical Exam:   GENERAL: NAD, Resting in bed  HEENT: NCAT  CHEST/LUNG: Clear to auscultation bilaterally; No wheezing or rubs.   HEART: Regular rate and rhythm; No murmurs, rubs, or gallops  ABDOMEN: Bowel sounds present; Soft, Nontender, Nondistended.   EXTREMITIES:  No clubbing, cyanosis, or edema  NERVOUS SYSTEM:  Alert & Oriented X3    FLUID BALANCE    21 @ 07:01  -  21 @ 07:00  --------------------------------------------------------  IN: 120 mL / OUT: 590 mL / NET: -470 mL    21 @ 07:01  -  21 @ 07:00  --------------------------------------------------------  IN: 2624 mL / OUT: 2980 mL / NET: -356 mL        Labs:                         12.1   10.15 )-----------( 104      ( 2021 09:10 )             35.6     Neutophil% 93.9, Lymphocyte% 2.6, Monocyte% 2.8, Bands% 0.6 21 @ 09:10    2021 09:10    138    |  99     |  22     ----------------------------<  248    3.7     |  27     |  0.7      Ca    8.1        2021 09:10  Mg     2.2       2021 09:10    TPro  5.8    /  Alb  3.2    /  TBili  0.2    /  DBili  x      /  AST  25     /  ALT  17     /  AlkPhos  84     2021 09:10        Amylase --, Lipase 21, 21 @ 16:27        Troponin 0.03, CKMB --, CK -- 21 @ 07:11  Troponin 0.05, CKMB --, CK -- 21 @ 01:49  Troponin 0.13, CKMB 4.0,  21 @ 20:18  Troponin 0.01, CKMB --, CK -- 21 @ 09:29    Iron 16, TIBC 246, %Sat 7 Ferritin 107 21 @ 09:29      Urinalysis Basic - ( 2021 20:30 )    Color: Dark Brown / Appearance: Turbid / S.024 / pH: x  Gluc: x / Ketone: Negative  / Bili: Negative / Urobili: <2 mg/dL   Blood: x / Protein: 300 mg/dL / Nitrite: Negative   Leuk Esterase: Moderate / RBC: >720 /HPF / WBC 10 /HPF   Sq Epi: x / Non Sq Epi: 13 /HPF / Bacteria: Negative          Culture - Blood (collected 21 @ 09:29)  Source: .Blood None  Gram Stain (21 @ 07:48):    Upon re-evaluation of gram stain:    Growth in aerobic and anaerobic bottles: Gram Negative Rods and Gram    Positive Cocci in Pairs and Chains  Preliminary Report (21 @ 07:48):    Growth in aerobic and anaerobic bottles: Gram Positive Cocci in Pairs and    Chains and Gram Negative Rods    ***Blood Panel PCR results on this specimen are available    approximately 3 hours after the Gram stain result.***    Gram stain, PCR, and/or culture results may not always    correspond due to difference in methodologies.    ************************************************************    This PCR assay was performed by multiplex PCR. This    Assay tests for 66 bacterial and resistance gene targets.    Please refer to the [facility] test directory    at [email or web address] for details.  Organism: Blood Culture PCR  Blood Culture PCR (21 @ 07:52)  Organism: Blood Culture PCR (21 @ 07:52)      -  Pseudomonas aeruginosa: Detec      Method Type: PCR  Organism: Blood Culture PCR (21 @ 07:34)      -  Enterococcus faecalis,VRE: Detec      Method Type: PCR          Ferritin, Serum: 107 ng/mL (21 @ 09:29)    C-Reactive Protein, Serum: 120 mg/L (21 @ 09:29)      Radiology:     CXR : Pending  Hospital Day:  3d    Subjective: Patient is a 65y old  Male who presents with a chief complaint of Flank Pain.       Pt seen and evaluated at bedside.   Complaints: Pt is agitated, bothered by noises from high flow nc machine, and wants to go home.   Over the night Events: Patient was placed on high flow nasal cannula.     Past Medical Hx:   BPH (benign prostatic hyperplasia)    Cerebral palsy    Seizure    Osteoporosis    Spastic quadriplegia    Urinary calculi    Urinary retention    Asthma      Past Sx:  S/P percutaneous endoscopic gastrostomy (PEG) tube placement    H/O cystoscopy    History of suprapubic catheter    Suprapubic catheter      Allergies:  No Known Allergies    Current Meds:   Standng Meds:  chlorhexidine 4% Liquid 1 Application(s) Topical <User Schedule>  enoxaparin Injectable 40 milliGRAM(s) SubCutaneous daily  linezolid  IVPB      linezolid  IVPB 600 milliGRAM(s) IV Intermittent every 12 hours  methylPREDNISolone sodium succinate Injectable 40 milliGRAM(s) IV Push every 8 hours  PHENobarbital Injectable 60 milliGRAM(s) IV Push daily  piperacillin/tazobactam IVPB.. 4.5 Gram(s) IV Intermittent every 8 hours  polyethylene glycol 3350 17 Gram(s) Oral two times a day  tamsulosin 0.4 milliGRAM(s) Oral at bedtime    PRN Meds:  ALBUTerol    90 MICROgram(s) HFA Inhaler 2 Puff(s) Inhalation every 6 hours PRN Bronchospasm  morphine  - Injectable 4 milliGRAM(s) IV Push every 6 hours PRN Moderate Pain (4 - 6)      Vital Signs:   T(F): 98 (21 @ 08:31), Max: 98.6 (21 @ 03:42)  HR: 102 (21 @ 08:31) (83 - 102)  BP: 118/64 (21 @ 08:31) (90/- - 119/74)  RR: 18 (21 @ 08:31) (18 - 98)  SpO2: 99% (21 @ 00:53) (93% - 99%)    Physical Exam:   GENERAL: Agitated; alert  HEENT: poor dentition;  CHEST/LUNG: Clear to auscultation bilaterally; decreased breath sounds bibasilar. no wheezes noted.   HEART: S1 S2 Regular rate and rhythm; no rubs, murmurs or gallops noted.   ABDOMEN: No tenderness to superficial or deep palpation. no guarding.   URINARY: Edouard site is clean. No flank tenderness to palpation bilaterally.   MUSCULOSKELETAL: UE are contracted      FLUID BALANCE    21 @ 07:01  -  21 @ 07:00  --------------------------------------------------------  IN: 120 mL / OUT: 590 mL / NET: -470 mL    21 @ 07:01  -  21 @ 07:00  --------------------------------------------------------  IN: 2624 mL / OUT: 2980 mL / NET: -356 mL        Labs:                         12.1   10.15 )-----------( 104      ( 2021 09:10 )             35.6     Neutophil% 93.9, Lymphocyte% 2.6, Monocyte% 2.8, Bands% 0.6 21 @ 09:10    2021 09:10    138    |  99     |  22     ----------------------------<  248    3.7     |  27     |  0.7      Ca    8.1        2021 09:10  Mg     2.2       2021 09:10    TPro  5.8    /  Alb  3.2    /  TBili  0.2    /  DBili  x      /  AST  25     /  ALT  17     /  AlkPhos  84     2021 09:10        Amylase --, Lipase 21, 21 @ 16:27        Troponin 0.03, CKMB --, CK -- 21 @ 07:11  Troponin 0.05, CKMB --, CK -- 21 @ 01:49  Troponin 0.13, CKMB 4.0,  21 @ 20:18  Troponin 0.01, CKMB --, CK -- 21 @ 09:29    Iron 16, TIBC 246, %Sat 7 Ferritin 107 21 @ 09:29      Urinalysis Basic - ( 2021 20:30 )    Color: Dark Brown / Appearance: Turbid / S.024 / pH: x  Gluc: x / Ketone: Negative  / Bili: Negative / Urobili: <2 mg/dL   Blood: x / Protein: 300 mg/dL / Nitrite: Negative   Leuk Esterase: Moderate / RBC: >720 /HPF / WBC 10 /HPF   Sq Epi: x / Non Sq Epi: 13 /HPF / Bacteria: Negative          Culture - Blood (collected 21 @ 09:29)  Source: .Blood None  Gram Stain (21 @ 07:48):    Upon re-evaluation of gram stain:    Growth in aerobic and anaerobic bottles: Gram Negative Rods and Gram    Positive Cocci in Pairs and Chains  Preliminary Report (21 @ 07:48):    Growth in aerobic and anaerobic bottles: Gram Positive Cocci in Pairs and    Chains and Gram Negative Rods    ***Blood Panel PCR results on this specimen are available    approximately 3 hours after the Gram stain result.***    Gram stain, PCR, and/or culture results may not always    correspond due to difference in methodologies.    ************************************************************    This PCR assay was performed by multiplex PCR. This    Assay tests for 66 bacterial and resistance gene targets.    Please refer to the [facility] test directory    at [email or web address] for details.  Organism: Blood Culture PCR  Blood Culture PCR (21 @ 07:52)  Organism: Blood Culture PCR (21 @ 07:52)      -  Pseudomonas aeruginosa: Detec      Method Type: PCR  Organism: Blood Culture PCR (21 @ 07:34)      -  Enterococcus faecalis,VRE: Detec      Method Type: PCR          Ferritin, Serum: 107 ng/mL (21 @ 09:29)    C-Reactive Protein, Serum: 120 mg/L (21 @ 09:29)      Radiology:     CXR : Pending

## 2021-06-20 NOTE — PROGRESS NOTE ADULT - ASSESSMENT
65y M with PMHx of cerebral palsy, Seizure disorder, spastic paraplegia s/p PEG, BPH, Hx of Nephrolithiasis s/p pcnl x 2 with known incompetent bladder neck s/p Suprapubic Tube Placement admitted on 6/17/21 w/ complaint of R flank pain.    Patient seen and examined at bedside, History obtained from group home chart. Patient reports feeling flank pain in the afternoon, radiating to RLQ, 10/10 with associated nausea/vomiting. Patient had Right-sided PCNL 5/20.   Denies any fever, chills, SOB, CP, dysuria, hematuria. SPT in place draining cloudy yellow urine.     in ER; VS stable, elevated lactate 2.2, UA + for bacteruria, CT abd showed Delayed right nephrogram. Moderate right hydroureteronephrosis secondary to an obstructing right proximal ureteral stone.  as Patient has a known incompetent bladder neck; therefore, Uro not definitely able to perform cystoscopy/ureteroscopy from below to place stents.   IR consulted for He may R PCN placement to decompress bladder and for percutaneous access.              this morning while pt in IR, he decompensated and developed urosepsis, fever 103, , BP 75//40  - 2 Liter of boluses and levophed   - Meropenem and vancomycin  - urgent Right PCN to drain the renal obstruction    #plans:    #obstructive urosepsis  - Right PCN urgently for renal drainage  - c/w Ava, has Hx of proteus ESBL, also add Vancomycin, ID eval  - LR 100cc/hr  - low dose Levo  - trend lactate, f/u blood and Ucx  - step down unit monitoring    #HO seizure disorder - c/w phenobarbital, make it IV for now,    Constipation, stool burden in rectum - aggressive bowel regimen - lactulose, miralax and enema   HO cerebral palsy / spastic paraplegia s/p PEG tube - c/w bowel regimen    HO BPH - c/w tamsulosin   HO Asthma - c/w inhalers    # Diet: NPO for now. tube feeding once stable   # DVT Prophylaxis: Lovenox  # Activity: IAT  # IV Fluids: LR @ 100cc  # Dispo: Acute  # Code Status: Fullcode.   65y M with PMHx of cerebral palsy, Seizure disorder, spastic paraplegia s/p PEG, BPH, Hx of Nephrolithiasis s/p pcnl x 2 (last oen in 5/20/21) with known incompetent bladder neck s/p Suprapubic Tube Placement admitted on 6/17/21 with sepsis 2/2 obstructive nephrolithiasis. CT revealed R proximal ureteral stone with moderate hydroureteronephrosis. On 6/18, pt decompensated to uroseptic shock (T 103F, BP 75/45). Emergent R Percutaneous Nephrolithotomy performed by IR on 6/18/2021. BP improved following procedure and LR bolus.     #obstructive urosepsis  - Right PCN urgently for renal drainage  - c/w Ava, has Hx of proteus ESBL, also add Vancomycin, ID eval  - LR 100cc/hr  - low dose Levo  - trend lactate, f/u blood and Ucx  - step down unit monitoring  - as per uro, stents placement not possible due to known incompetent bladder neck.     #HO seizure disorder - c/w phenobarbital, make it IV for now,    Constipation, stool burden in rectum - aggressive bowel regimen - lactulose, miralax and enema   HO cerebral palsy / spastic paraplegia s/p PEG tube - c/w bowel regimen    HO BPH - c/w tamsulosin   HO Asthma - c/w inhalers    # Diet: NPO for now. tube feeding once stable   # DVT Prophylaxis: Lovenox  # Activity: IAT  # IV Fluids: LR @ 100cc  # Dispo: Acute  # Code Status: Fullcode.   65y M with PMHx of cerebral palsy, Seizure disorder, spastic paraplegia s/p PEG, BPH, Hx of Nephrolithiasis s/p pcnl x 2 (last oen in 5/20/21) with known incompetent bladder neck s/p Suprapubic Tube Placement admitted on 6/17/21 with sepsis 2/2 obstructive nephrolithiasis. CT revealed R proximal ureteral stone with moderate hydroureteronephrosis. On 6/18, pt decompensated to uroseptic shock (T 103F, BP 75/45). Emergent R Percutaneous Nephrolithotomy performed by IR on 6/18/2021. BP improved following procedure and LR bolus.     # Septic shock 2/2 pyelonephritis from obstructive nephrolithiasis - resolving   # obstructive urosepsis - resolving  - s/p R PCN on 6/18  - Cultures 6/18 (+) for Pseudomonas and e.Fecalis  - as per ID, c/w Zyvox and Zosyn  - low dose Levo  - lactate wnl (1.5 on 6/18)  - as per uro, stents placement not possible due to known incompetent bladder neck.     #HO seizure disorder  - Phenobarbital on 6/20 held until trough can be ordered    Constipation, stool burden in rectum - aggressive bowel regimen - lactulose, miralax and enema   HO cerebral palsy / spastic paraplegia s/p PEG tube - c/w bowel regimen    HO BPH - c/w tamsulosin   HO Asthma - c/w inhalers    # Diet: NPO for now. tube feeding once stable   # DVT Prophylaxis: Lovenox  # Activity: IAT  # IV Fluids:   # Dispo: Acute  # Code Status: Fullcode.   65y M with PMHx of cerebral palsy, Seizure disorder, spastic paraplegia s/p PEG, BPH, Hx of Nephrolithiasis s/p pcnl x 2 (last oen in 5/20/21) with known incompetent bladder neck s/p Suprapubic Tube Placement admitted on 6/17/21 with sepsis 2/2 obstructive nephrolithiasis. CT revealed R proximal ureteral stone with moderate hydroureteronephrosis. On 6/18, pt decompensated to uroseptic shock (T 103F, BP 75/45). Emergent R Percutaneous Nephrolithotomy performed by IR on 6/18/2021. BP improved following procedure and LR bolus.     # Septic shock 2/2 pyelonephritis from obstructive nephrolithiasis - resolving   # obstructive urosepsis - resolving  - s/p R PCN on 6/18  - Cultures 6/18 (+) for Pseudomonas and e.Fecalis  - as per ID, c/w Zyvox and Zosyn  - Levo discontinued on 6/20 (MAP consistently >80)  - lactate wnl (1.5 on 6/18)  - as per uro, stents placement not possible due to known incompetent bladder neck.     #HO seizure disorder  - Phenobarbital on 6/20 held until trough can be ordered    Constipation, stool burden in rectum - aggressive bowel regimen - lactulose, miralax and enema   HO cerebral palsy / spastic paraplegia s/p PEG tube - c/w bowel regimen    HO BPH - c/w tamsulosin   HO Asthma - c/w inhalers    # Diet: NPO for now. tube feeding once stable   # DVT Prophylaxis: Lovenox  # Activity: IAT  # IV Fluids:   # Dispo: Acute  # Code Status: Fullcode.

## 2021-06-20 NOTE — DIETITIAN INITIAL EVALUATION ADULT. - PHYSCIAL ASSESSMENT
overweight/contracted/other (specify) Per group home aid, no recent wt loss of note, no wt per NH documentations however reports possible wt gain since PEG placement.     no edema noted; skin WDL (6/19).

## 2021-06-20 NOTE — PROGRESS NOTE ADULT - ASSESSMENT
IMPRESSION:    Sepsis present on admission  Septic shock, improving  Obstructive pyelonephritis s/p Right PCN  Pseudomonas & E. faecalis VRE bacteremia  Chronic suprapubic alatorre  Seizure yesterday? HO Seizures  HO Cerebral palsy chronically contracted      PLAN:    CNS:  Avoid CNS depressants.  FU EEG.  Check Phenobarbital trough, c/w phenobarbital.  Neuro FU    HEENT:  Oral care.  Aspiration precautions.    PULMONARY:  HOB @ 45 degrees. CXR today.  Wean O2 as tolerated, Goal >92%    CARDIOVASCULAR:  Lasix IV 40mg.  Keep I = O    GI: GI prophylaxis. PEG feeds.  Hold laxatives today.    RENAL: FU lytes.  Correct as needed.  Monitor UO.  FU IR & Urology.  Monitor nephrostomy drain.    INFECTIOUS DISEASE:  Linezolid and Zosyn.  ID following.  Follow up cultures & sensitivities.  Repeat blood cultures.    HEMATOLOGICAL:  DVT prophylaxis.    ENDOCRINE:  Follow up FS.  Insulin protocol if needed.    MUSCULOSKELETAL: bed rest    CODE STATUS: FULL CODE    DISPOSITION: SDU IMPRESSION:    Sepsis present on admission  Septic shock, improving  Obstructive pyelonephritis s/p Right PCN  Pseudomonas & E. faecalis VRE bacteremia  Chronic suprapubic alatorre  Seizure yesterday? HO Seizures  HO Cerebral palsy chronically contracted      PLAN:    CNS:  Avoid CNS depressants.  FU EEG.  Check Phenobarbital trough, c/w phenobarbital.  Neuro FU    HEENT:  Oral care.  Aspiration precautions.    PULMONARY:  HOB @ 45 degrees. CXR today.  Wean O2 as tolerated, Goal 88 to 94%    CARDIOVASCULAR:  Lasix IV 40mg daily    GI: GI prophylaxis. PEG feeds.  Hold laxatives today.    RENAL: FU lytes.  Correct as needed.  Monitor UO.  FU IR & Urology.  Monitor nephrostomy drain.    INFECTIOUS DISEASE:  Linezolid and Zosyn.  ID following.   Repeat blood cultures.    HEMATOLOGICAL:  DVT prophylaxis.    ENDOCRINE:  Follow up FS.  Insulin protocol if needed.    MUSCULOSKELETAL: bed rest    CODE STATUS: FULL CODE    DISPOSITION: SDU

## 2021-06-20 NOTE — PROGRESS NOTE ADULT - SUBJECTIVE AND OBJECTIVE BOX
Patient is a 65y old  Male who presents with a chief complaint of Nephrolithiasis (2021 09:56)    Over Night Events:  Currently on HFNC 50/50.  Tolerated NIV HS for 4 hours last night.    ROS:   All ROS are negative except HPI     PHYSICAL EXAM  ICU Vital Signs Last 24 Hrs  T(C): 36.7 (2021 08:31), Max: 37 (2021 03:42)  T(F): 98 (2021 08:31), Max: 98.6 (2021 03:42)  HR: 102 (2021 08:31) (83 - 102)  BP: 118/64 (2021 08:31) (90/- - 119/74)  BP(mean): 86 (2021 08:31) (70 - 87)  RR: 18 (2021 08:31) (18 - 98)  SpO2: 99% (2021 00:53) (93% - 99%)      CONSTITUTIONAL:  ill looking    ENT:   Airway patent,   Mouth with normal mucosa.   No thrush  on HFNC 50/50    CARDIAC:   Normal rate,   Regular rhythm.    No edema    RESPIRATORY:   No wheezing  Bilateral BS  Normal chest expansion  Not tachypneic,  No use of accessory muscles    GASTROINTESTINAL:  Abdomen soft,   Non-tender,   No guarding,   Right PCN draining  PEG    MUSCULOSKELETAL:   Range of motion is not limited,  No clubbing, cyanosis    NEUROLOGICAL:   Alert awake  follows commands  contracted      21 @ 07:01  -  21 @ 07:00  --------------------------------------------------------  IN:    Enteral Tube Flush: 200 mL    IV PiggyBack: 300 mL    IV PiggyBack: 300 mL    IV PiggyBack: 100 mL    Jevity 1.2: 1200 mL    Lactated Ringers: 400 mL    Norepinephrine: 124 mL  Total IN: 2624 mL    OUT:    Indwelling Catheter - Suprapubic (mL): 2650 mL    Nephrostomy Tube (mL): 330 mL    Stool (mL): 0 mL  Total OUT: 2980 mL    Total NET: -356 mL      LABS:                        12.1   10.15 )-----------( 104      ( 2021 09:10 )             35.6     138  |  104  |  28<H>  ----------------------------<  165<H>  5.0   |  22  |  0.8    Ca    7.9<L>      2021 07:11  Mg     3.0     06-19    TPro  5.1<L>  /  Alb  2.8<L>  /  TBili  0.3  /  DBili  x   /  AST  24  /  ALT  14  /  AlkPhos  86  06-19    Urinalysis Basic - ( 2021 20:30 )  Color: Dark Brown / Appearance: Turbid / S.024 / pH: x  Gluc: x / Ketone: Negative  / Bili: Negative / Urobili: <2 mg/dL   Blood: x / Protein: 300 mg/dL / Nitrite: Negative   Leuk Esterase: Moderate / RBC: >720 /HPF / WBC 10 /HPF   Sq Epi: x / Non Sq Epi: 13 /HPF / Bacteria: Negative    CARDIAC MARKERS ( 2021 07:11 )  x     / 0.03 ng/mL / x     / x     / x      CARDIAC MARKERS ( 2021 01:49 )  x     / 0.05 ng/mL / x     / x     / x      CARDIAC MARKERS ( 2021 20:18 )  x     / 0.13 ng/mL / 149 U/L / x     / 4.0 ng/mL    LIVER FUNCTIONS - ( 2021 07:11 )  Alb: 2.8 g/dL / Pro: 5.1 g/dL / ALK PHOS: 86 U/L / ALT: 14 U/L / AST: 24 U/L / GGT: x                                              Culture - Urine (collected 2021 14:23)  Source: Kidney Right Kidney  Preliminary Report (2021 21:56):    Numerous Pseudomonas aeruginosa    Culture - Blood (collected 2021 09:29)  Source: .Blood None  Gram Stain (2021 07:48):    Upon re-evaluation of gram stain:    Growth in aerobic and anaerobic bottles: Gram Negative Rods and Gram    Positive Cocci in Pairs and Chains  Preliminary Report (2021 07:48):    Growth in aerobic and anaerobic bottles: Gram Positive Cocci in Pairs and    Chains and Gram Negative Rods    ***Blood Panel PCR results on this specimen are available    approximately 3 hours after the Gram stain result.***    Gram stain, PCR, and/or culture results may not always    correspond due to difference in methodologies.    ************************************************************    This PCR assay was performed by multiplex PCR. This    Assay tests for 66 bacterial and resistance gene targets.    Please refer to the [facility] test directory    at [email or web address] for details.  Organism: Blood Culture PCR  Blood Culture PCR (2021 07:52)  Organism: Blood Culture PCR (2021 07:52)  Organism: Blood Culture PCR (2021 07:34)                                                 ABG - ( 2021 18:15 )  pH, Arterial: 7.39  pH, Blood: x     /  pCO2: 44    /  pO2: 112   / HCO3: 27    / Base Excess: 1.2   /  SaO2: 98        MEDICATIONS  (STANDING):  bisacodyl Suppository 10 milliGRAM(s) Rectal daily  chlorhexidine 4% Liquid 1 Application(s) Topical <User Schedule>  enoxaparin Injectable 40 milliGRAM(s) SubCutaneous daily  lactulose Syrup 10 Gram(s) Oral daily  linezolid  IVPB      linezolid  IVPB 600 milliGRAM(s) IV Intermittent every 12 hours  methylPREDNISolone sodium succinate Injectable 40 milliGRAM(s) IV Push every 8 hours  norepinephrine Infusion 0.05 MICROgram(s)/kG/Min (5.38 mL/Hr) IV Continuous <Continuous>  PHENobarbital Injectable 60 milliGRAM(s) IV Push daily  piperacillin/tazobactam IVPB.. 4.5 Gram(s) IV Intermittent every 8 hours  polyethylene glycol 3350 17 Gram(s) Oral two times a day  tamsulosin 0.4 milliGRAM(s) Oral at bedtime    MEDICATIONS  (PRN):  ALBUTerol    90 MICROgram(s) HFA Inhaler 2 Puff(s) Inhalation every 6 hours PRN Bronchospasm  morphine  - Injectable 4 milliGRAM(s) IV Push every 6 hours PRN Moderate Pain (4 - 6)      New X-rays reviewed:                                                                                  ECHO    CXR interpreted by me : left IJ, bilateral opacities     Patient is a 65y old  Male who presents with a chief complaint of Nephrolithiasis (2021 09:56)    Over Night Events:  Currently on HFNC 50/50.  Tolerated NIV HS for 4 hours last night.    ROS:   All ROS are negative except HPI     PHYSICAL EXAM  ICU Vital Signs Last 24 Hrs  T(C): 36.7 (2021 08:31), Max: 37 (2021 03:42)  T(F): 98 (2021 08:31), Max: 98.6 (2021 03:42)  HR: 102 (2021 08:31) (83 - 102)  BP: 118/64 (2021 08:31) (90/- - 119/74)  BP(mean): 86 (2021 08:31) (70 - 87)  RR: 18 (2021 08:31) (18 - 98)  SpO2: 99% (2021 00:53) (93% - 99%)      CONSTITUTIONAL:  ill looking    ENT:   Airway patent,   Mouth with normal mucosa.   No thrush  on HFNC 50/50    CARDIAC:   Normal rate,   Regular rhythm.    No edema    RESPIRATORY:   bl crackles    GASTROINTESTINAL:  Abdomen soft,   Non-tender,   No guarding,   Right PCN draining  PEG    MUSCULOSKELETAL:   Range of motion is not limited,  No clubbing, cyanosis    NEUROLOGICAL:   Alert awake  follows commands  contracted      21 @ 07:01  -  21 @ 07:00  --------------------------------------------------------  IN:    Enteral Tube Flush: 200 mL    IV PiggyBack: 300 mL    IV PiggyBack: 300 mL    IV PiggyBack: 100 mL    Jevity 1.2: 1200 mL    Lactated Ringers: 400 mL    Norepinephrine: 124 mL  Total IN: 2624 mL    OUT:    Indwelling Catheter - Suprapubic (mL): 2650 mL    Nephrostomy Tube (mL): 330 mL    Stool (mL): 0 mL  Total OUT: 2980 mL    Total NET: -356 mL      LABS:                        12.1   10.15 )-----------( 104      ( 2021 09:10 )             35.6     138  |  104  |  28<H>  ----------------------------<  165<H>  5.0   |  22  |  0.8    Ca    7.9<L>      2021 07:11  Mg     3.0     -    TPro  5.1<L>  /  Alb  2.8<L>  /  TBili  0.3  /  DBili  x   /  AST  24  /  ALT  14  /  AlkPhos  86  -    Urinalysis Basic - ( 2021 20:30 )  Color: Dark Brown / Appearance: Turbid / S.024 / pH: x  Gluc: x / Ketone: Negative  / Bili: Negative / Urobili: <2 mg/dL   Blood: x / Protein: 300 mg/dL / Nitrite: Negative   Leuk Esterase: Moderate / RBC: >720 /HPF / WBC 10 /HPF   Sq Epi: x / Non Sq Epi: 13 /HPF / Bacteria: Negative    CARDIAC MARKERS ( 2021 07:11 )  x     / 0.03 ng/mL / x     / x     / x      CARDIAC MARKERS ( 2021 01:49 )  x     / 0.05 ng/mL / x     / x     / x      CARDIAC MARKERS ( 2021 20:18 )  x     / 0.13 ng/mL / 149 U/L / x     / 4.0 ng/mL    LIVER FUNCTIONS - ( 2021 07:11 )  Alb: 2.8 g/dL / Pro: 5.1 g/dL / ALK PHOS: 86 U/L / ALT: 14 U/L / AST: 24 U/L / GGT: x                                              Culture - Urine (collected 2021 14:23)  Source: Kidney Right Kidney  Preliminary Report (2021 21:56):    Numerous Pseudomonas aeruginosa    Culture - Blood (collected 2021 09:29)  Source: .Blood None  Gram Stain (2021 07:48):    Upon re-evaluation of gram stain:    Growth in aerobic and anaerobic bottles: Gram Negative Rods and Gram    Positive Cocci in Pairs and Chains  Preliminary Report (2021 07:48):    Growth in aerobic and anaerobic bottles: Gram Positive Cocci in Pairs and    Chains and Gram Negative Rods    ***Blood Panel PCR results on this specimen are available    approximately 3 hours after the Gram stain result.***    Gram stain, PCR, and/or culture results may not always    correspond due to difference in methodologies.    ************************************************************    This PCR assay was performed by multiplex PCR. This    Assay tests for 66 bacterial and resistance gene targets.    Please refer to the [facility] test directory    at [email or web address] for details.  Organism: Blood Culture PCR  Blood Culture PCR (2021 07:52)  Organism: Blood Culture PCR (2021 07:52)  Organism: Blood Culture PCR (2021 07:34)                                                 ABG - ( 2021 18:15 )  pH, Arterial: 7.39  pH, Blood: x     /  pCO2: 44    /  pO2: 112   / HCO3: 27    / Base Excess: 1.2   /  SaO2: 98        MEDICATIONS  (STANDING):  bisacodyl Suppository 10 milliGRAM(s) Rectal daily  chlorhexidine 4% Liquid 1 Application(s) Topical <User Schedule>  enoxaparin Injectable 40 milliGRAM(s) SubCutaneous daily  lactulose Syrup 10 Gram(s) Oral daily  linezolid  IVPB      linezolid  IVPB 600 milliGRAM(s) IV Intermittent every 12 hours  methylPREDNISolone sodium succinate Injectable 40 milliGRAM(s) IV Push every 8 hours  norepinephrine Infusion 0.05 MICROgram(s)/kG/Min (5.38 mL/Hr) IV Continuous <Continuous>  PHENobarbital Injectable 60 milliGRAM(s) IV Push daily  piperacillin/tazobactam IVPB.. 4.5 Gram(s) IV Intermittent every 8 hours  polyethylene glycol 3350 17 Gram(s) Oral two times a day  tamsulosin 0.4 milliGRAM(s) Oral at bedtime    MEDICATIONS  (PRN):  ALBUTerol    90 MICROgram(s) HFA Inhaler 2 Puff(s) Inhalation every 6 hours PRN Bronchospasm  morphine  - Injectable 4 milliGRAM(s) IV Push every 6 hours PRN Moderate Pain (4 - 6)      New X-rays reviewed:                                                                                  ECHO    CXR interpreted by me : left IJ, bilateral opacities

## 2021-06-20 NOTE — DIETITIAN INITIAL EVALUATION ADULT. - ORAL INTAKE PTA/DIET HISTORY
Per group home aid + NH docs; PEG feeds of Jevity 237 mL 3x/d + pleasure feeds of pureed/nectar thickened liquids. NKFA/intolerances. Liquid MVI supplement + cranberry pill + aggressive bowel regimen r/t hx of constipation.

## 2021-06-20 NOTE — PROGRESS NOTE ADULT - ASSESSMENT
64yo Male s/p right PCN placement by IR on 6/18 with sepsis & hypotension - pt on low dose pressors, weaned off BiPAP on high flow NC     Plan:  ·	urine culture preliminary results show pseudomonas aeruginosa, f/u final results  ·	blood cultures grew pseudomonas aeruginosa & enterococcus faecalis, f/u c&s  ·	continue IV abx as per ID recommendations, currently on zoysn & zyvox  ·	WBC downtrending, 28.7>>19.6>>10.15   ·	continue to monitor right PCN output   ·	continue IV fluids  ·	continue SP tube care  ·	monitor VS   ·	continue currently medical management as per primary team  ·	will discuss with attending    66yo Male s/p right PCN placement by IR on 6/18 with sepsis & hypotension - pt on low dose pressors, weaned off BiPAP on high flow NC     Plan:  ·	urine culture preliminary results show pseudomonas aeruginosa, f/u final results  ·	blood cultures grew pseudomonas aeruginosa & enterococcus faecalis, f/u c&s  ·	continue IV abx as per ID recommendations, currently on zoysn & zyvox  ·	WBC downtrending, 28.7>>19.6>>10.15   ·	continue to monitor right PCN output   ·	continue IV fluids  ·	continue SP tube care  ·	monitor VS   ·	continue currently medical management as per primary team  ·	will discuss with attending       pt wants out of vent unit as noises bother him. He was explained why that cannot happen yet    for now no gu intervention and we will hold on changing PCN to PCNU until he is significantly more stable.   I still feel nephrology for stone prevention is indicated especially as he del cid snot really choose his own diet    for now PCN when able change to PCNU AND then probably d/c stabilize and bring back for PCNL of renal and ureteral stones

## 2021-06-20 NOTE — PROGRESS NOTE ADULT - SUBJECTIVE AND OBJECTIVE BOX
TISH SHAIKH  65y, Male  Allergy: No Known Allergies      LOS  3d    CHIEF COMPLAINT: Nephrolithiasis (2021 11:11)      INTERVAL EVENTS/HPI  - No acute events overnight  - T(F): , Max: 98.6 (21 @ 03:42)  - Denies any worsening symptoms  - Tolerating medication  - WBC Count: 10.15 (21 @ 09:10) downtrending   WBC Count: 19.63 (21 @ 07:11)  - Creatinine, Serum: 0.7 (21 @ 09:10)  Creatinine, Serum: 0.8 (21 @ 07:11)       ROS  General: Denies rigors, nightsweats  HEENT: Denies headache, rhinorrhea, sore throat, eye pain  CV: Denies CP, palpitations  PULM: Denies wheezing, hemoptysis  GI: Denies hematemesis, hematochezia, melena  : Denies discharge, hematuria  MSK: Denies arthralgias, myalgias  SKIN: Denies rash, lesions  NEURO: Denies paresthesias, weakness  PSYCH: Denies depression, anxiety    VITALS:  T(F): 98, Max: 98.6 (21 @ 03:42)  HR: 102  BP: 118/64  RR: 18Vital Signs Last 24 Hrs  T(C): 36.7 (2021 08:31), Max: 37 (2021 03:42)  T(F): 98 (2021 08:31), Max: 98.6 (2021 03:42)  HR: 102 (2021 08:31) (83 - 102)  BP: 118/64 (2021 08:31) (90/- - 119/74)  BP(mean): 86 (2021 08:31) (70 - 86)  RR: 18 (2021 08:31) (18 - 98)  SpO2: 99% (2021 00:53) (93% - 99%)  PHYSICAL EXAM:  Gen: NAD, resting in bed, chronically ill appearing contracted HFNC  HEENT: Normocephalic, atraumatic  Neck: supple, no lymphadenopathy  CV: Regular rate & regular rhythm  Lungs: decreased BS at bases, no fremitus  Abdomen: Soft, BS present PEG SPC R PCN  Ext: Warm, well perfused  Neuro: non focal, awake  Skin: no rash, no erythema  Lines: no phlebitis       FH: Non-contributory  Social Hx: Non-contributory    TESTS & MEASUREMENTS:                        12.1   10.15 )-----------( 104      ( 2021 09:10 )             35.6         138  |  99  |  22<H>  ----------------------------<  248<H>  3.7   |  27  |  0.7    Ca    8.1<L>      2021 09:10  Mg     2.2         TPro  5.8<L>  /  Alb  3.2<L>  /  TBili  0.2  /  DBili  x   /  AST  25  /  ALT  17  /  AlkPhos  84      eGFR if Non African American: 99 mL/min/1.73M2 (21 @ 09:10)  eGFR if : 115 mL/min/1.73M2 (21 @ 09:10)    LIVER FUNCTIONS - ( 2021 09:10 )  Alb: 3.2 g/dL / Pro: 5.8 g/dL / ALK PHOS: 84 U/L / ALT: 17 U/L / AST: 25 U/L / GGT: x           Urinalysis Basic - ( 2021 20:30 )    Color: Dark Brown / Appearance: Turbid / S.024 / pH: x  Gluc: x / Ketone: Negative  / Bili: Negative / Urobili: <2 mg/dL   Blood: x / Protein: 300 mg/dL / Nitrite: Negative   Leuk Esterase: Moderate / RBC: >720 /HPF / WBC 10 /HPF   Sq Epi: x / Non Sq Epi: 13 /HPF / Bacteria: Negative        Culture - Urine (collected 21 @ 14:23)  Source: Kidney Right Kidney  Preliminary Report (21 @ 21:56):    Numerous Pseudomonas aeruginosa    Culture - Blood (collected 21 @ 09:29)  Source: .Blood None  Gram Stain (21 @ 07:48):    Upon re-evaluation of gram stain:    Growth in aerobic and anaerobic bottles: Gram Negative Rods and Gram    Positive Cocci in Pairs and Chains  Preliminary Report (21 @ 11:22):    Growth in aerobic and anaerobic bottles: Enterococcus faecalis    Growth in aerobic and anaerobic bottles: Pseudomonas aeruginosa    Susceptibility to follow.    ***Blood Panel PCR results on this specimen are available    approximately 3 hours after the Gram stain result.***    Gram stain, PCR, and/or culture results may not always    correspond due to difference in methodologies.    ************************************************************    This PCR assay was performed by multiplex PCR. This    Assay tests for 66 bacterial and resistance gene targets.    Please refer to the [facility] test directory    at [email or web address] for details.  Organism: Blood Culture PCR  Blood Culture PCR (21 @ 07:52)  Organism: Blood Culture PCR (21 @ 07:52)      -  Pseudomonas aeruginosa: Detec      Method Type: PCR  Organism: Blood Culture PCR (21 @ 07:34)      -  Enterococcus faecalis,VRE: Detec      Method Type: PCR    Culture - Urine (collected 21 @ 16:27)  Source: .Urine Clean Catch (Midstream)  Preliminary Report (21 @ 10:11):    >100,000 CFU/ml Gram Negative Rods        Lactate, Blood: 1.7 mmol/L (21 @ 09:10)  Lactate, Blood: 2.0 mmol/L (21 @ 07:11)  Lactate, Blood: 2.3 mmol/L (21 @ 22:02)  Lactate, Blood: 1.8 mmol/L (21 @ 09:29)  Lactate, Blood: 2.2 mmol/L (21 @ 16:27)      INFECTIOUS DISEASES TESTING  COVID-19 PCR: NotDetec (21 @ 22:22)  Rapid RVP Result: NotDetec (21 @ 11:14)  COVID-19 PCR: NotDetec (20 @ 15:45)  COVID-19 PCR: NotDetec (20 @ 14:19)      INFLAMMATORY MARKERS  C-Reactive Protein, Serum: 120 mg/L (21 @ 09:29)      RADIOLOGY & ADDITIONAL TESTS:  I have personally reviewed the last available Chest xray  CXR  Xray Chest 1 View- PORTABLE-Urgent:   EXAM:  XR CHEST PORTABLE URGENT 1V            PROCEDURE DATE:  2021            INTERPRETATION:  Clinical History / Reason for exam: Central line placement    Comparison : Chest radiograph same day exam at 1:34 PM.    Technique/Positioning: AP.    Findings:    Support devices: Left IJ central venous catheter with tip in the SVC.    Cardiac/mediastinum/hilum: Stable cardiomegaly    Lung parenchyma/Pleura: Increased bilateral pulmonary opacities. Small left pleural effusion.    Skeleton/softtissues: Stable.    Impression:    Increased bilateral parenchymal opacities. Small left pleural effusion. Left IJ central line with tip in the SVC.              DIANNA ACUNA MD; Attending Radiologist  This document has been electronically signed. 2021  4:03PM (21 @ 17:43)      CT  CT Abdomen and Pelvis w/ IV Cont:   EXAM:  CT ABDOMEN AND PELVIS IC            PROCEDURE DATE:  2021            INTERPRETATION:  CLINICAL HISTORY: Right flank pain.    TECHNIQUE: Contiguous axial CT images were obtained from the lower chest to the pubic symphysis following administration of Optiray intravenous contrast. Oral contrast was not administered. Reformatted images in the coronal and sagittal planes were acquired.    COMPARISON: CT abdomen pelvis dated 2020.      FINDINGS:    The study is degraded by motion and beam hardening artifact secondary to patient arm positioning.    LOWER CHEST: Bibasilar atelectasis.    HEPATOBILIARY: Left hepatic lobe hypodensity to small to characterize.    SPLEEN: Unremarkable.    PANCREAS: Unremarkable.    ADRENAL GLANDS: Unremarkable.    KIDNEYS: Delayed right nephrogram. Moderate right hydroureteronephrosis secondary to an obstructing right proximal ureteral stone. Stone size is difficult to measure due to extensive artifact. This stone appears to measure at least 6 mm craniocaudally (max ). Additional nonobstructing right renal calculi. Linear dense material in the left renal collecting system likely representing excreted contrast material. Possible punctate left upper pole renal calculus. Left renal hypodensity too small to characterize.    ABDOMINOPELVIC NODES: Unremarkable    PELVIC ORGANS: Suprapubic catheter within the urinary bladder. Bladder wall thickening.. Prostatic calcifications.    PERITONEUM/MESENTERY/BOWEL: Gaseous distention of sigmoid colon. No evidence of bowel obstruction free air or fluid collection. Unremarkable appendix. Gastrostomy tube in place.    BONES/SOFT TISSUES: Osteopenia. Degenerative changes of the spine. Chronic pelvic deformity. Small fluid within bilateral inguinal hernias.          IMPRESSION:    1.  Obstructing proximal right ureteral stone with moderate hydroureteronephrosis.  2.  Bladder wall thickening. Correlate with urinalysis.  3.  See body of the report for additional findings.              VALENTINA DORSEY MD; Attending Radiologist  This document has been electronically signed. 2021  7:33PM (21 @ 18:58)      CARDIOLOGY TESTING  12 Lead ECG:   Ventricular Rate 85 BPM    Atrial Rate 85 BPM    P-R Interval 122 ms    QRS Duration 80 ms    Q-T Interval 380 ms    QTC Calculation(Bazett) 452 ms    P Axis 46 degrees    R Axis 22 degrees    T Axis -10 degrees    Diagnosis Line Normal sinus rhythm  Nonspecific T wave abnormality minor  Otherwise normal ECG    Confirmed by YESICA SALAZAR MD ([id number]) on 2021 10:46:39 AM (21 @ 07:53)  12 Lead ECG:   Ventricular Rate 112 BPM    Atrial Rate 112 BPM    P-R Interval 120 ms    QRS Duration 84 ms    Q-T Interval 348 ms    QTC Calculation(Bazett) 475 ms    P Axis 45 degrees    R Axis 36 degrees    T Axis 4 degrees    Diagnosis Line Sinus tachycardia  Otherwise normal ECG    Confirmed by EBENEZER EDDY MD ([id number]) on 2021 4:15:45 PM (21 @ 14:35)      MEDICATIONS  chlorhexidine 4% Liquid 1 Topical <User Schedule>  enoxaparin Injectable 40 SubCutaneous daily  furosemide   Injectable 40 IV Push daily  linezolid  IVPB     linezolid  IVPB 600 IV Intermittent every 12 hours  methylPREDNISolone sodium succinate Injectable 40 IV Push every 8 hours  PHENobarbital Injectable 60 IV Push daily  piperacillin/tazobactam IVPB.. 4.5 IV Intermittent every 8 hours  polyethylene glycol 3350 17 Oral two times a day  tamsulosin 0.4 Oral at bedtime      WEIGHT  Weight (kg): 57.4 (21 @ 05:00)  Creatinine, Serum: 0.7 mg/dL (21 @ 09:10)      ANTIBIOTICS:  linezolid  IVPB      linezolid  IVPB 600 milliGRAM(s) IV Intermittent every 12 hours  piperacillin/tazobactam IVPB.. 4.5 Gram(s) IV Intermittent every 8 hours      All available historical records have been reviewed

## 2021-06-20 NOTE — DIETITIAN INITIAL EVALUATION ADULT. - ADD RECOMMEND
continue current EN regimen as tolerated, consider ordering pureed/nectar thickened in additional to PEG feeds (pleasure feeds @ NH), maintain aspiration precautions. pt meeting 100% est kcal/protein requirements via current EN regimen otherwise.

## 2021-06-20 NOTE — DIETITIAN INITIAL EVALUATION ADULT. - RD TO REMAIN AVAILABLE
INTERVENTION: EN v coordination of care; ME: RD to monitor diet order, energy intake, body composition, NFPF, glucose profile/yes

## 2021-06-20 NOTE — DIETITIAN INITIAL EVALUATION ADULT. - FACTORS AFF FOOD INTAKE
Pt denies any abdominal pain or nausea at time of visit; tolerating current TF regimen @ goal. No BM yet since admission per EMR documentation; pt is on a bowel regimen./developmental delay/NPO

## 2021-06-20 NOTE — DIETITIAN INITIAL EVALUATION ADULT. - ENERGY INTAKE
Receiving tube feeding and tolerating @ goal rate EN regimen meets % est kcal requirements and % est protein requirements

## 2021-06-20 NOTE — DIETITIAN INITIAL EVALUATION ADULT. - REASON INDICATOR FOR ASSESSMENT
EN pta; assessment overdue x24h d/t large volume of pts. Group home aid at bedside provides most of the nutrition hx.

## 2021-06-20 NOTE — PROGRESS NOTE ADULT - SUBJECTIVE AND OBJECTIVE BOX
Progress Note    Patient is a 64yo Male s/p right PCN placement by IR on 6/18 with sepsis & hypotension. Pt seen and examined at bedside this morning. Pt currently on high flow NC 50/50 comfortably. As per respiratory therapist at bedside, pt will need BiPAP during the night but tolerating well with HFNC during the day so far. Pt asking when can he go home. Denies any fevers, N/V, abdominal pain or flank pain.       [ x ] a 10 point review of systems was negative except where noted        Vital signs  T(C): , Max: 37 (06-20-21 @ 03:42)  HR: 102 (06-20-21 @ 08:31)  BP: 118/64 (06-20-21 @ 08:31)  SpO2: 99% (06-20-21 @ 00:53)    Physical Exam  Gen: contracted, NAD, & awake  HEENT: NC/AT  Respiratory: currently on high flow NC 50/50 sating 98%  Back: no CVA tenderness bilaterally, + right PCN in place, dressing clean/dry/intact, draining blood tinged urine, color improved from previous exam yesterday     Abd: soft, nontender, no suprapubic tenderness elicited upon palpation   : + 26Fr SP tube in place draining clear yellow urine with sediment noted   Ext: no edema  Skin: non diaphoretic     Labs                        12.1   10.15 )-----------( 104      ( 20 Jun 2021 09:10 )             35.6     20 Jun 2021 09:10    138    |  99     |  22     ----------------------------<  248    3.7     |  27     |  0.7      Ca    8.1        20 Jun 2021 09:10  Mg     2.2       20 Jun 2021 09:10      I&O's Detail    19 Jun 2021 07:01  -  20 Jun 2021 07:00  --------------------------------------------------------  IN:    Enteral Tube Flush: 200 mL    IV PiggyBack: 300 mL    IV PiggyBack: 300 mL    IV PiggyBack: 100 mL    Jevity 1.2: 1200 mL    Lactated Ringers: 400 mL    Norepinephrine: 124 mL  Total IN: 2624 mL    OUT:    Indwelling Catheter - Suprapubic (mL): 2650 mL    Nephrostomy Tube (mL): 330 mL    Stool (mL): 0 mL  Total OUT: 2980 mL    Total NET: -356 mL        Culture - Urine (06.18.21 @ 14:23)    Specimen Source: Kidney Right Kidney    Culture Results:   Numerous Pseudomonas aeruginosa    Culture - Blood (06.18.21 @ 09:29)    -  Pseudomonas aeruginosa: Detec    -  Enterococcus faecalis,VRE: Detec    Gram Stain:   Upon re-evaluation of gram stain:  Growth in aerobic and anaerobic bottles: Gram Negative Rods and Gram  Positive Cocci in Pairs and Chains    Specimen Source: .Blood None    Organism: Blood Culture PCR    Organism: Blood Culture PCR    Culture Results:   Growth in aerobic and anaerobic bottles: Gram Positive Cocci in Pairs and  Chains and Gram Negative Rods  ***Blood Panel PCR results on this specimen are available  approximately 3 hours after the Gram stain result.***  Gram stain, PCR, and/or culture results may not always  correspond due to difference in methodologies.  ************************************************************  This PCR assay was performed by multiplex PCR. This  Assay tests for 66 bacterial and resistance gene targets.  Please refer to the [facility] test directory  at [email or web address] for details.    Organism Identification: Blood Culture PCR  Blood Culture PCR    Method Type: PCR    Method Type: PCR                Progress Note    Patient is a 64yo Male s/p right PCN placement by IR on 6/18 with sepsis & hypotension. Pt seen and examined at bedside this morning. Pt currently on high flow NC 50/50 comfortably. As per respiratory therapist at bedside, pt will need BiPAP during the night but tolerating well with HFNC during the day so far. Pt asking when can he go home. Denies any fevers, N/V, abdominal pain or flank pain.       [ x ] a 10 point review of systems was negative except where noted        Vital signs  T(C): , Max: 37 (06-20-21 @ 03:42)  HR: 102 (06-20-21 @ 08:31)  BP: 118/64 (06-20-21 @ 08:31)  SpO2: 99% (06-20-21 @ 00:53)    Physical Exam  Gen: contracted, NAD, & awake  HEENT: NC/AT  Respiratory: currently on high flow NC 50/50 sating 98%  Back: no CVA tenderness bilaterally, + right PCN in place, dressing clean/dry/intact, draining blood tinged urine, color improved from previous exam yesterday   when i saw him on afternoon rounds the PCN urine was clear  Abd: soft, nontender, no suprapubic tenderness elicited upon palpation   : + 26Fr SP tube in place draining clear yellow urine with sediment noted   Ext: no edema  Skin: non diaphoretic     Labs                        12.1   10.15 )-----------( 104      ( 20 Jun 2021 09:10 )             35.6     20 Jun 2021 09:10    138    |  99     |  22     ----------------------------<  248    3.7     |  27     |  0.7      Ca    8.1        20 Jun 2021 09:10  Mg     2.2       20 Jun 2021 09:10      I&O's Detail    19 Jun 2021 07:01  -  20 Jun 2021 07:00  --------------------------------------------------------  IN:    Enteral Tube Flush: 200 mL    IV PiggyBack: 300 mL    IV PiggyBack: 300 mL    IV PiggyBack: 100 mL    Jevity 1.2: 1200 mL    Lactated Ringers: 400 mL    Norepinephrine: 124 mL  Total IN: 2624 mL    OUT:    Indwelling Catheter - Suprapubic (mL): 2650 mL    Nephrostomy Tube (mL): 330 mL    Stool (mL): 0 mL  Total OUT: 2980 mL    Total NET: -356 mL        Culture - Urine (06.18.21 @ 14:23)    Specimen Source: Kidney Right Kidney    Culture Results:   Numerous Pseudomonas aeruginosa    Culture - Blood (06.18.21 @ 09:29)    -  Pseudomonas aeruginosa: Detec    -  Enterococcus faecalis,VRE: Detec    Gram Stain:   Upon re-evaluation of gram stain:  Growth in aerobic and anaerobic bottles: Gram Negative Rods and Gram  Positive Cocci in Pairs and Chains    Specimen Source: .Blood None    Organism: Blood Culture PCR    Organism: Blood Culture PCR    Culture Results:   Growth in aerobic and anaerobic bottles: Gram Positive Cocci in Pairs and  Chains and Gram Negative Rods  ***Blood Panel PCR results on this specimen are available  approximately 3 hours after the Gram stain result.***  Gram stain, PCR, and/or culture results may not always  correspond due to difference in methodologies.  ************************************************************  This PCR assay was performed by multiplex PCR. This  Assay tests for 66 bacterial and resistance gene targets.  Please refer to the [facility] test directory  at [email or web address] for details.    Organism Identification: Blood Culture PCR  Blood Culture PCR    Method Type: PCR    Method Type: PCR

## 2021-06-21 LAB
-  AMIKACIN: SIGNIFICANT CHANGE UP
-  AMPICILLIN/SULBACTAM: SIGNIFICANT CHANGE UP
-  AMPICILLIN: SIGNIFICANT CHANGE UP
-  AZTREONAM: SIGNIFICANT CHANGE UP
-  CEFAZOLIN: SIGNIFICANT CHANGE UP
-  CEFEPIME: SIGNIFICANT CHANGE UP
-  CEFTAZIDIME: SIGNIFICANT CHANGE UP
-  CIPROFLOXACIN: SIGNIFICANT CHANGE UP
-  CIPROFLOXACIN: SIGNIFICANT CHANGE UP
-  GENTAMICIN: SIGNIFICANT CHANGE UP
-  GENTAMICIN: SIGNIFICANT CHANGE UP
-  IMIPENEM: SIGNIFICANT CHANGE UP
-  LEVOFLOXACIN: SIGNIFICANT CHANGE UP
-  LEVOFLOXACIN: SIGNIFICANT CHANGE UP
-  MEROPENEM: SIGNIFICANT CHANGE UP
-  OXACILLIN: SIGNIFICANT CHANGE UP
-  PENICILLIN: SIGNIFICANT CHANGE UP
-  PIPERACILLIN/TAZOBACTAM: SIGNIFICANT CHANGE UP
-  RIFAMPIN: SIGNIFICANT CHANGE UP
-  TETRACYCLINE: SIGNIFICANT CHANGE UP
-  TETRACYCLINE: SIGNIFICANT CHANGE UP
-  TOBRAMYCIN: SIGNIFICANT CHANGE UP
-  TRIMETHOPRIM/SULFAMETHOXAZOLE: SIGNIFICANT CHANGE UP
-  VANCOMYCIN: SIGNIFICANT CHANGE UP
-  VANCOMYCIN: SIGNIFICANT CHANGE UP
ALBUMIN SERPL ELPH-MCNC: 3.3 G/DL — LOW (ref 3.5–5.2)
ALP SERPL-CCNC: 72 U/L — SIGNIFICANT CHANGE UP (ref 30–115)
ALT FLD-CCNC: 20 U/L — SIGNIFICANT CHANGE UP (ref 0–41)
ANION GAP SERPL CALC-SCNC: 13 MMOL/L — SIGNIFICANT CHANGE UP (ref 7–14)
AST SERPL-CCNC: 72 U/L — HIGH (ref 0–41)
BASOPHILS # BLD AUTO: 0 K/UL — SIGNIFICANT CHANGE UP (ref 0–0.2)
BASOPHILS NFR BLD AUTO: 0 % — SIGNIFICANT CHANGE UP (ref 0–1)
BILIRUB SERPL-MCNC: 0.4 MG/DL — SIGNIFICANT CHANGE UP (ref 0.2–1.2)
BUN SERPL-MCNC: 22 MG/DL — HIGH (ref 10–20)
CALCIUM SERPL-MCNC: 8.2 MG/DL — LOW (ref 8.5–10.1)
CHLORIDE SERPL-SCNC: 96 MMOL/L — LOW (ref 98–110)
CO2 SERPL-SCNC: 28 MMOL/L — SIGNIFICANT CHANGE UP (ref 17–32)
CREAT SERPL-MCNC: 0.8 MG/DL — SIGNIFICANT CHANGE UP (ref 0.7–1.5)
CULTURE RESULTS: SIGNIFICANT CHANGE UP
EOSINOPHIL # BLD AUTO: 0.01 K/UL — SIGNIFICANT CHANGE UP (ref 0–0.7)
EOSINOPHIL NFR BLD AUTO: 0.1 % — SIGNIFICANT CHANGE UP (ref 0–8)
GLUCOSE SERPL-MCNC: 255 MG/DL — HIGH (ref 70–99)
HCT VFR BLD CALC: 34.5 % — LOW (ref 42–52)
HGB BLD-MCNC: 12.1 G/DL — LOW (ref 14–18)
IMM GRANULOCYTES NFR BLD AUTO: 0.4 % — HIGH (ref 0.1–0.3)
LYMPHOCYTES # BLD AUTO: 0.48 K/UL — LOW (ref 1.2–3.4)
LYMPHOCYTES # BLD AUTO: 4.5 % — LOW (ref 20.5–51.1)
MAGNESIUM SERPL-MCNC: 2.1 MG/DL — SIGNIFICANT CHANGE UP (ref 1.8–2.4)
MCHC RBC-ENTMCNC: 31.3 PG — HIGH (ref 27–31)
MCHC RBC-ENTMCNC: 35.1 G/DL — SIGNIFICANT CHANGE UP (ref 32–37)
MCV RBC AUTO: 89.4 FL — SIGNIFICANT CHANGE UP (ref 80–94)
METHOD TYPE: SIGNIFICANT CHANGE UP
MONOCYTES # BLD AUTO: 0.53 K/UL — SIGNIFICANT CHANGE UP (ref 0.1–0.6)
MONOCYTES NFR BLD AUTO: 5 % — SIGNIFICANT CHANGE UP (ref 1.7–9.3)
NEUTROPHILS # BLD AUTO: 9.5 K/UL — HIGH (ref 1.4–6.5)
NEUTROPHILS NFR BLD AUTO: 90 % — HIGH (ref 42.2–75.2)
NRBC # BLD: 0 /100 WBCS — SIGNIFICANT CHANGE UP (ref 0–0)
PHOSPHATE SERPL-MCNC: 2.4 MG/DL — SIGNIFICANT CHANGE UP (ref 2.1–4.9)
PLATELET # BLD AUTO: 120 K/UL — LOW (ref 130–400)
POTASSIUM SERPL-MCNC: 5 MMOL/L — SIGNIFICANT CHANGE UP (ref 3.5–5)
POTASSIUM SERPL-SCNC: 5 MMOL/L — SIGNIFICANT CHANGE UP (ref 3.5–5)
PROT SERPL-MCNC: 6.2 G/DL — SIGNIFICANT CHANGE UP (ref 6–8)
RBC # BLD: 3.86 M/UL — LOW (ref 4.7–6.1)
RBC # FLD: 13.1 % — SIGNIFICANT CHANGE UP (ref 11.5–14.5)
SODIUM SERPL-SCNC: 137 MMOL/L — SIGNIFICANT CHANGE UP (ref 135–146)
SPECIMEN SOURCE: SIGNIFICANT CHANGE UP
WBC # BLD: 10.56 K/UL — SIGNIFICANT CHANGE UP (ref 4.8–10.8)
WBC # FLD AUTO: 10.56 K/UL — SIGNIFICANT CHANGE UP (ref 4.8–10.8)

## 2021-06-21 PROCEDURE — 93306 TTE W/DOPPLER COMPLETE: CPT | Mod: 26

## 2021-06-21 PROCEDURE — 99232 SBSQ HOSP IP/OBS MODERATE 35: CPT

## 2021-06-21 PROCEDURE — 71045 X-RAY EXAM CHEST 1 VIEW: CPT | Mod: 26

## 2021-06-21 RX ADMIN — TAMSULOSIN HYDROCHLORIDE 0.4 MILLIGRAM(S): 0.4 CAPSULE ORAL at 21:32

## 2021-06-21 RX ADMIN — PIPERACILLIN AND TAZOBACTAM 25 GRAM(S): 4; .5 INJECTION, POWDER, LYOPHILIZED, FOR SOLUTION INTRAVENOUS at 21:32

## 2021-06-21 RX ADMIN — LINEZOLID 300 MILLIGRAM(S): 600 INJECTION, SOLUTION INTRAVENOUS at 05:28

## 2021-06-21 RX ADMIN — Medication 40 MILLIGRAM(S): at 15:51

## 2021-06-21 RX ADMIN — ENOXAPARIN SODIUM 40 MILLIGRAM(S): 100 INJECTION SUBCUTANEOUS at 12:30

## 2021-06-21 RX ADMIN — Medication 60 MILLIGRAM(S): at 12:30

## 2021-06-21 RX ADMIN — PIPERACILLIN AND TAZOBACTAM 25 GRAM(S): 4; .5 INJECTION, POWDER, LYOPHILIZED, FOR SOLUTION INTRAVENOUS at 15:50

## 2021-06-21 RX ADMIN — CHLORHEXIDINE GLUCONATE 1 APPLICATION(S): 213 SOLUTION TOPICAL at 05:29

## 2021-06-21 RX ADMIN — LINEZOLID 300 MILLIGRAM(S): 600 INJECTION, SOLUTION INTRAVENOUS at 18:03

## 2021-06-21 RX ADMIN — Medication 40 MILLIGRAM(S): at 21:32

## 2021-06-21 RX ADMIN — PIPERACILLIN AND TAZOBACTAM 25 GRAM(S): 4; .5 INJECTION, POWDER, LYOPHILIZED, FOR SOLUTION INTRAVENOUS at 05:27

## 2021-06-21 RX ADMIN — POLYETHYLENE GLYCOL 3350 17 GRAM(S): 17 POWDER, FOR SOLUTION ORAL at 18:03

## 2021-06-21 RX ADMIN — Medication 40 MILLIGRAM(S): at 05:28

## 2021-06-21 RX ADMIN — POLYETHYLENE GLYCOL 3350 17 GRAM(S): 17 POWDER, FOR SOLUTION ORAL at 06:06

## 2021-06-21 NOTE — PROGRESS NOTE ADULT - SUBJECTIVE AND OBJECTIVE BOX
Please call the patient regarding her abnormal result.  The cultured bacteria is susceptible to the prescribed antibiotic. Please ask how patient is feeling.   OVERNIGHT EVENTS: events noted, on [facility] 50/50    Vital Signs Last 24 Hrs  T(C): 36.7 (21 Jun 2021 08:14), Max: 36.7 (20 Jun 2021 08:31)  T(F): 98 (21 Jun 2021 08:14), Max: 98 (20 Jun 2021 08:31)  HR: 76 (21 Jun 2021 08:14) (70 - 102)  BP: 102/54 (21 Jun 2021 08:14) (102/54 - 122/58)  BP(mean): 74 (21 Jun 2021 08:14) (74 - 86)  RR: 20 (21 Jun 2021 08:14) (18 - 97)  SpO2: 98% (21 Jun 2021 04:00) (98% - 99%)    PHYSICAL EXAMINATION:    GENERAL: ILL LOOKING    HEENT: Head is normocephalic and atraumatic.     NECK: Supple.    LUNGS: DEC BS BOTH BASES    HEART: Regular rate and rhythm without murmur.    ABDOMEN: Soft, nontender, and nondistended.      EXTREMITIES: Without any cyanosis, clubbing, rash, lesions or edema.        LABS:                        12.1   10.15 )-----------( 104      ( 20 Jun 2021 09:10 )             35.6     06-20    138  |  99  |  22<H>  ----------------------------<  248<H>  3.7   |  27  |  0.7    Ca    8.1<L>      20 Jun 2021 09:10  Mg     2.2     06-20    TPro  5.8<L>  /  Alb  3.2<L>  /  TBili  0.2  /  DBili  x   /  AST  25  /  ALT  17  /  AlkPhos  84  06-20                  Lactate, Blood: 1.7 mmol/L (06-20-21 @ 09:10)          06-20-21 @ 07:01  -  06-21-21 @ 07:00  --------------------------------------------------------  IN: 1250 mL / OUT: 5153 mL / NET: -3903 mL        MICROBIOLOGY:  Culture Results:   Numerous Pseudomonas aeruginosa (Carbapenem Resistant)  Numerous Staphylococcus aureus  Numerous Actinotignum schaali group "Susceptibilities not performed" (06-18 @ 14:23)  Culture Results:   Growth in aerobic and anaerobic bottles: Enterococcus faecalis  Growth in aerobic and anaerobic bottles: Pseudomonas aeruginosa  Susceptibility to follow.  ***Blood Panel PCR results on this specimen are available  approximately 3 hours after the Gram stain result.***  Gram stain, PCR, and/or culture results may not always  correspond due to difference in methodologies.  ************************************************************  This PCR assay was performed by multiplex PCR. This  Assay tests for 66 bacterial and resistance gene targets.  Please refer to the [facility] Labs test directory  at [email or web address] for details. (06-18 @ 09:29)  Culture Results:   >100,000 CFU/ml Gram Negative Rods (06-17 @ 16:27)      MEDICATIONS  (STANDING):  chlorhexidine 4% Liquid 1 Application(s) Topical <User Schedule>  enoxaparin Injectable 40 milliGRAM(s) SubCutaneous daily  furosemide   Injectable 40 milliGRAM(s) IV Push daily  linezolid  IVPB      linezolid  IVPB 600 milliGRAM(s) IV Intermittent every 12 hours  methylPREDNISolone sodium succinate Injectable 40 milliGRAM(s) IV Push every 8 hours  PHENobarbital Injectable 60 milliGRAM(s) IV Push daily  piperacillin/tazobactam IVPB.. 4.5 Gram(s) IV Intermittent every 8 hours  polyethylene glycol 3350 17 Gram(s) Oral two times a day  tamsulosin 0.4 milliGRAM(s) Oral at bedtime    MEDICATIONS  (PRN):  ALBUTerol    90 MICROgram(s) HFA Inhaler 2 Puff(s) Inhalation every 6 hours PRN Bronchospasm  morphine  - Injectable 4 milliGRAM(s) IV Push every 6 hours PRN Moderate Pain (4 - 6)      RADIOLOGY & ADDITIONAL STUDIES:

## 2021-06-21 NOTE — PROGRESS NOTE ADULT - ASSESSMENT
65y M with PMHx of cerebral palsy, Seizure disorder, spastic paraplegia s/p PEG, BPH, Hx of Nephrolithiasis s/p pcnl x 2 (last oen in 5/20/21) with known incompetent bladder neck s/p Suprapubic Tube Placement admitted on 6/17/21 with sepsis 2/2 obstructive nephrolithiasis. CT revealed R proximal ureteral stone with moderate hydroureteronephrosis. On 6/18, pt decompensated to uroseptic shock (T 103F, BP 75/45). Emergent R Percutaneous Nephrolithotomy performed by IR on 6/18/2021. BP improved following procedure and LR bolus.     # Septic shock 2/2 pyelonephritis from obstructive nephrolithiasis - resolving   # obstructive urosepsis - resolving  - s/p R PCN on 6/18  - Cultures 6/18 (+) for Pseudomonas and e.Fecalis  - off levo since 6/20   - lactate wnl (1.5 on 6/18)  - as per uro, stents placement not possible due to known incompetent bladder neck   - when medically optimized, IR consult to convert nephrostomy to a nephro-ureteral stent and then eventual PCNL   - as per ID, c/w Zyvox and Zosyn    #acute hypoxic respiratory failure possibly secondary to acute seizure   #fluid overload   - CXR: stable b/l opacities and effusions   - on 50/50 HFNC   - TTE: EF 62%, mild aortic stenosis  - c/w lasix 40mg IV daily   - seizure disorder treatment     #Seizure disorder  #Hx of cerebral palsy/spastic paraplegia s/p PEG tube   - was a rapid response in IR for seizure on 6/18  - c/w phenobarbital 60mg IV push daily (takes 64.8mg orally at home)   - PEG feedings     #BPH: c/w tamsulosin   #Asthma:  c/w inhalers    DVT ppx: lovenox  GI ppx: none  Diet: PEG feeds  Code Status: FULL CODE  Dispo: from nursing home

## 2021-06-21 NOTE — PROGRESS NOTE ADULT - ASSESSMENT
66 y/o Male known to urology. Pt with hx of nephrolithiasis s/p PCN awaiting PCNL    A) nephrolithiasis  septic shock 2/2 complex UTI, Pyelonephritis  neurogenic bladder  CP quadriplegia  seizure disorder    P) Continue IV abx as per ID  When medically optimal IR consult to convert nephrostomy  to a nephro-ureteral stent and will then schedule for PCNL  d/w attending

## 2021-06-21 NOTE — PROGRESS NOTE ADULT - ASSESSMENT
IMPRESSION:    Sepsis present on admission  Septic shock, improving  Obstructive pyelonephritis s/p Right PCN  Pseudomonas & E. faecalis VRE bacteremia  Chronic suprapubic alatorre  Seizure yesterday? HO Seizures  HO Cerebral palsy chronically contracted      PLAN:    CNS:  Avoid CNS depressants.  FU EEG.  Check Phenobarbital trough, c/w phenobarbital.  Neuro FU    HEENT:  Oral care.  Aspiration precautions.    PULMONARY:  HOB @ 45 degrees. CXR today.  Wean O2 as tolerated, Goal 88 to 94%    CARDIOVASCULAR:  Lasix IV 40mg daily    GI: GI prophylaxis. PEG feeds.  Hold laxatives today.    RENAL: FU lytes.  Correct as needed.  Monitor UO.  FU IR & Urology.  Monitor nephrostomy drain.    INFECTIOUS DISEASE:  Linezolid and Zosyn.  ID following.   Repeat blood cultures.    HEMATOLOGICAL:  DVT prophylaxis.    ENDOCRINE:  Follow up FS.  Insulin protocol if needed.    MUSCULOSKELETAL: bed rest    CODE STATUS: FULL CODE    DISPOSITION: SDU

## 2021-06-21 NOTE — PROGRESS NOTE ADULT - SUBJECTIVE AND OBJECTIVE BOX
Hospital Day:  4d    Subjective: Patient is a 65y old  Male who presents with a chief complaint of Nephrolithiasis (2021 11:17)      Pt seen and evaluated at bedside.   Complaints: none; patient is alert oriented and in no distress; mental status is better  Over the night Events: none     Past Medical Hx:   BPH (benign prostatic hyperplasia)    Cerebral palsy    Seizure    Osteoporosis    Spastic quadriplegia    Urinary calculi    Urinary retention    Asthma      Past Sx:  S/P percutaneous endoscopic gastrostomy (PEG) tube placement    H/O cystoscopy    History of suprapubic catheter    Suprapubic catheter      Allergies:  No Known Allergies    Current Meds:   Standng Meds:  chlorhexidine 4% Liquid 1 Application(s) Topical <User Schedule>  enoxaparin Injectable 40 milliGRAM(s) SubCutaneous daily  furosemide   Injectable 40 milliGRAM(s) IV Push daily  linezolid  IVPB      linezolid  IVPB 600 milliGRAM(s) IV Intermittent every 12 hours  methylPREDNISolone sodium succinate Injectable 40 milliGRAM(s) IV Push every 8 hours  PHENobarbital Injectable 60 milliGRAM(s) IV Push daily  piperacillin/tazobactam IVPB.. 4.5 Gram(s) IV Intermittent every 8 hours  polyethylene glycol 3350 17 Gram(s) Oral two times a day  tamsulosin 0.4 milliGRAM(s) Oral at bedtime    PRN Meds:  ALBUTerol    90 MICROgram(s) HFA Inhaler 2 Puff(s) Inhalation every 6 hours PRN Bronchospasm  morphine  - Injectable 4 milliGRAM(s) IV Push every 6 hours PRN Moderate Pain (4 - 6)      Vital Signs:   T(F): 98.4 (21 @ 20:05), Max: 98.4 (21 @ 20:05)  HR: 82 (21 @ 20:05) (73 - 95)  BP: 108/64 (21 @ 20:05) (102/54 - 120/62)  RR: 18 (21 @ 20:05) (18 - 20)  SpO2: 98% (21 @ 20:05) (96% - 99%)    Physical Exam:   GENERAL: alert, not agitated   HEENT: poor dentition;  CHEST/LUNG: Clear to auscultation bilaterally; decreased breath sounds bibasilar. no wheezes noted.   HEART: S1 S2 Regular rate and rhythm; no rubs, murmurs or gallops noted.   ABDOMEN: No tenderness to superficial or deep palpation. no guarding.   URINARY: Edouard site is clean. No flank tenderness to palpation bilaterally.   MUSCULOSKELETAL: UE are contracted      FLUID BALANCE    21 @ 07:01  -  21 @ 07:00  --------------------------------------------------------  IN: 2624 mL / OUT: 2980 mL / NET: -356 mL    21 @ 07:01  -  21 @ 07:00  --------------------------------------------------------  IN: 1250 mL / OUT: 5153 mL / NET: -3903 mL    21 @ 07:01  -  21 @ 21:34  --------------------------------------------------------  IN: 500 mL / OUT: 2 mL / NET: 498 mL        Labs:                         12.1   10.56 )-----------( 120      ( 2021 07:17 )             34.5     Neutophil% 90.0, Lymphocyte% 4.5, Monocyte% 5.0, Bands% 0.4 21 @ 07:17    2021 07:17    137    |  96     |  22     ----------------------------<  255    5.0     |  28     |  0.8      Ca    8.2        2021 07:17  Phos  2.4       2021 07:17  Mg     2.1       2021 07:17    TPro  6.2    /  Alb  3.3    /  TBili  0.4    /  DBili  x      /  AST  72     /  ALT  20     /  AlkPhos  72     2021 07:17        Amylase --, Lipase 21, 21 @ 16:27        Troponin 0.03, CKMB --, CK -- 21 @ 07:11  Troponin 0.05, CKMB --, CK -- 21 @ 01:49  Troponin 0.13, CKMB 4.0,  21 @ 20:18  Troponin 0.01, CKMB --, CK -- 21 @ 09:29        Urinalysis Basic - ( 2021 20:30 )    Color: Dark Brown / Appearance: Turbid / S.024 / pH: x  Gluc: x / Ketone: Negative  / Bili: Negative / Urobili: <2 mg/dL   Blood: x / Protein: 300 mg/dL / Nitrite: Negative   Leuk Esterase: Moderate / RBC: >720 /HPF / WBC 10 /HPF   Sq Epi: x / Non Sq Epi: 13 /HPF / Bacteria: Negative          Culture - Blood (collected 21 @ 09:29)  Source: .Blood None  Gram Stain (21 @ 07:48):    Upon re-evaluation of gram stain:    Growth in aerobic and anaerobic bottles: Gram Negative Rods and Gram    Positive Cocci in Pairs and Chains  Preliminary Report (21 @ 13:28):    Growth in aerobic and anaerobic bottles: Enterococcus faecalis    Susceptibility to follow.    Growth in aerobic and anaerobic bottles: Pseudomonas aeruginosa    (Carbapenem Resistant)    Growth in anaerobic bottle: Proteus mirabilis Susceptibility to follow.    ***Blood Panel PCR results on this specimen are available    approximately 3 hours after the Gram stain result.***    Gram stain, PCR, and/or culture results may not always    correspond due to difference in methodologies.    ************************************************************    This PCR assay was performed by multiplex PCR. This    Assay tests for 66 bacterial and resistance gene targets.    Please refer to the [facility] test directory    at [email or web address] for details.  Organism: Blood Culture PCR  Blood Culture PCR  Pseudomonas aeruginosa (Carbapenem Resistant) (21 @ 09:52)  Organism: Pseudomonas aeruginosa (Carbapenem Resistant) (21 @ 09:52)      -  Amikacin: S <=16      -  Aztreonam: S 8      -  Cefepime: I 16      -  Ceftazidime: I 16      -  Ciprofloxacin: R >2      -  Gentamicin: S 4      -  Imipenem: R >8      -  Levofloxacin: R >4      -  Meropenem: R 8      -  Piperacillin/Tazobactam: I 64      -  Tobramycin: S <=2      Method Type: MIGUEL A  Organism: Blood Culture PCR (21 @ 07:52)      -  Pseudomonas aeruginosa: Detec      Method Type: PCR  Organism: Blood Culture PCR (21 @ 07:34)      -  Enterococcus faecalis,VRE: Detec      Method Type: PCR          Ferritin, Serum: 107 ng/mL (21 @ 09:29)    C-Reactive Protein, Serum: 120 mg/L (21 @ 09:29)

## 2021-06-21 NOTE — PROGRESS NOTE ADULT - SUBJECTIVE AND OBJECTIVE BOX
Progress Note    Subjective Pt seen and examined at bedside   66 y/o Male known to urology. Pt with hx of nephrolithiasis s/p PCN awaiting PCNL  when medically optimal. Pt doing better still on high flow O2.    [x  ] a 10 point review of systems was negative except where noted    Vital signs  T(C): , Max: 36.7 (06-21-21 @ 08:14)  HR: 76 (06-21-21 @ 08:14)  BP: 102/54 (06-21-21 @ 08:14)  SpO2: 98% (06-21-21 @ 04:00)    Gen NC/AT in NAD  SKIN warm, dry with good tugor  Neck supple, FROM  LUNGS No dyspnea, No accessory muscle use  BACK No CVAT right PCN draining clear yellow urine  ABD    Soft non tender, bladder not palpable Sp tube in place draining slightly cloudy urine.         Labs                        12.1   10.56 )-----------( 120      ( 21 Jun 2021 07:17 )             34.5     21 Jun 2021 07:17    137    |  96     |  22     ----------------------------<  255    5.0     |  28     |  0.8      Ca    8.2        21 Jun 2021 07:17  Phos  2.4       21 Jun 2021 07:17  Mg     2.1       21 Jun 2021 07:17

## 2021-06-22 LAB
-  AMIKACIN: SIGNIFICANT CHANGE UP
-  AMIKACIN: SIGNIFICANT CHANGE UP
-  AMOXICILLIN/CLAVULANIC ACID: SIGNIFICANT CHANGE UP
-  AMPICILLIN/SULBACTAM: SIGNIFICANT CHANGE UP
-  AMPICILLIN/SULBACTAM: SIGNIFICANT CHANGE UP
-  AMPICILLIN: SIGNIFICANT CHANGE UP
-  AZTREONAM: SIGNIFICANT CHANGE UP
-  AZTREONAM: SIGNIFICANT CHANGE UP
-  CEFAZOLIN: SIGNIFICANT CHANGE UP
-  CEFAZOLIN: SIGNIFICANT CHANGE UP
-  CEFEPIME: SIGNIFICANT CHANGE UP
-  CEFEPIME: SIGNIFICANT CHANGE UP
-  CEFOXITIN: SIGNIFICANT CHANGE UP
-  CEFOXITIN: SIGNIFICANT CHANGE UP
-  CEFTAZIDIME/AVIBACTAM: SIGNIFICANT CHANGE UP
-  CEFTOLOZANE/TAZOBACTAM: SIGNIFICANT CHANGE UP
-  CEFTRIAXONE: SIGNIFICANT CHANGE UP
-  CEFTRIAXONE: SIGNIFICANT CHANGE UP
-  CIPROFLOXACIN: SIGNIFICANT CHANGE UP
-  CIPROFLOXACIN: SIGNIFICANT CHANGE UP
-  ERTAPENEM: SIGNIFICANT CHANGE UP
-  ERTAPENEM: SIGNIFICANT CHANGE UP
-  GENTAMICIN SYNERGY: SIGNIFICANT CHANGE UP
-  GENTAMICIN: SIGNIFICANT CHANGE UP
-  GENTAMICIN: SIGNIFICANT CHANGE UP
-  LEVOFLOXACIN: SIGNIFICANT CHANGE UP
-  LEVOFLOXACIN: SIGNIFICANT CHANGE UP
-  MEROPENEM: SIGNIFICANT CHANGE UP
-  MEROPENEM: SIGNIFICANT CHANGE UP
-  PIPERACILLIN/TAZOBACTAM: SIGNIFICANT CHANGE UP
-  PIPERACILLIN/TAZOBACTAM: SIGNIFICANT CHANGE UP
-  TOBRAMYCIN: SIGNIFICANT CHANGE UP
-  TOBRAMYCIN: SIGNIFICANT CHANGE UP
-  TRIMETHOPRIM/SULFAMETHOXAZOLE: SIGNIFICANT CHANGE UP
-  TRIMETHOPRIM/SULFAMETHOXAZOLE: SIGNIFICANT CHANGE UP
-  VANCOMYCIN: SIGNIFICANT CHANGE UP
ALBUMIN SERPL ELPH-MCNC: 3.7 G/DL — SIGNIFICANT CHANGE UP (ref 3.5–5.2)
ALP SERPL-CCNC: 77 U/L — SIGNIFICANT CHANGE UP (ref 30–115)
ALT FLD-CCNC: 18 U/L — SIGNIFICANT CHANGE UP (ref 0–41)
ANION GAP SERPL CALC-SCNC: 10 MMOL/L — SIGNIFICANT CHANGE UP (ref 7–14)
AST SERPL-CCNC: 21 U/L — SIGNIFICANT CHANGE UP (ref 0–41)
BASOPHILS # BLD AUTO: 0.01 K/UL — SIGNIFICANT CHANGE UP (ref 0–0.2)
BASOPHILS NFR BLD AUTO: 0.1 % — SIGNIFICANT CHANGE UP (ref 0–1)
BILIRUB SERPL-MCNC: 0.3 MG/DL — SIGNIFICANT CHANGE UP (ref 0.2–1.2)
BUN SERPL-MCNC: 23 MG/DL — HIGH (ref 10–20)
CALCIUM SERPL-MCNC: 8.7 MG/DL — SIGNIFICANT CHANGE UP (ref 8.5–10.1)
CHLORIDE SERPL-SCNC: 101 MMOL/L — SIGNIFICANT CHANGE UP (ref 98–110)
CO2 SERPL-SCNC: 32 MMOL/L — SIGNIFICANT CHANGE UP (ref 17–32)
CREAT SERPL-MCNC: 0.6 MG/DL — LOW (ref 0.7–1.5)
CULTURE RESULTS: SIGNIFICANT CHANGE UP
CULTURE RESULTS: SIGNIFICANT CHANGE UP
EOSINOPHIL # BLD AUTO: 0 K/UL — SIGNIFICANT CHANGE UP (ref 0–0.7)
EOSINOPHIL NFR BLD AUTO: 0 % — SIGNIFICANT CHANGE UP (ref 0–8)
GLUCOSE SERPL-MCNC: 133 MG/DL — HIGH (ref 70–99)
HCT VFR BLD CALC: 37.5 % — LOW (ref 42–52)
HGB BLD-MCNC: 12.9 G/DL — LOW (ref 14–18)
IMM GRANULOCYTES NFR BLD AUTO: 0.7 % — HIGH (ref 0.1–0.3)
LYMPHOCYTES # BLD AUTO: 1.44 K/UL — SIGNIFICANT CHANGE UP (ref 1.2–3.4)
LYMPHOCYTES # BLD AUTO: 14.8 % — LOW (ref 20.5–51.1)
MAGNESIUM SERPL-MCNC: 2.1 MG/DL — SIGNIFICANT CHANGE UP (ref 1.8–2.4)
MCHC RBC-ENTMCNC: 30.7 PG — SIGNIFICANT CHANGE UP (ref 27–31)
MCHC RBC-ENTMCNC: 34.4 G/DL — SIGNIFICANT CHANGE UP (ref 32–37)
MCV RBC AUTO: 89.3 FL — SIGNIFICANT CHANGE UP (ref 80–94)
METHOD TYPE: SIGNIFICANT CHANGE UP
MONOCYTES # BLD AUTO: 0.98 K/UL — HIGH (ref 0.1–0.6)
MONOCYTES NFR BLD AUTO: 10 % — HIGH (ref 1.7–9.3)
NEUTROPHILS # BLD AUTO: 7.26 K/UL — HIGH (ref 1.4–6.5)
NEUTROPHILS NFR BLD AUTO: 74.4 % — SIGNIFICANT CHANGE UP (ref 42.2–75.2)
NRBC # BLD: 0 /100 WBCS — SIGNIFICANT CHANGE UP (ref 0–0)
ORGANISM # SPEC MICROSCOPIC CNT: SIGNIFICANT CHANGE UP
PHOSPHATE SERPL-MCNC: 2.5 MG/DL — SIGNIFICANT CHANGE UP (ref 2.1–4.9)
PLATELET # BLD AUTO: 164 K/UL — SIGNIFICANT CHANGE UP (ref 130–400)
POTASSIUM SERPL-MCNC: 3.2 MMOL/L — LOW (ref 3.5–5)
POTASSIUM SERPL-SCNC: 3.2 MMOL/L — LOW (ref 3.5–5)
PROT SERPL-MCNC: 6.2 G/DL — SIGNIFICANT CHANGE UP (ref 6–8)
RBC # BLD: 4.2 M/UL — LOW (ref 4.7–6.1)
RBC # FLD: 12.7 % — SIGNIFICANT CHANGE UP (ref 11.5–14.5)
SODIUM SERPL-SCNC: 143 MMOL/L — SIGNIFICANT CHANGE UP (ref 135–146)
SPECIMEN SOURCE: SIGNIFICANT CHANGE UP
SPECIMEN SOURCE: SIGNIFICANT CHANGE UP
WBC # BLD: 9.76 K/UL — SIGNIFICANT CHANGE UP (ref 4.8–10.8)
WBC # FLD AUTO: 9.76 K/UL — SIGNIFICANT CHANGE UP (ref 4.8–10.8)

## 2021-06-22 PROCEDURE — 99233 SBSQ HOSP IP/OBS HIGH 50: CPT

## 2021-06-22 PROCEDURE — 99232 SBSQ HOSP IP/OBS MODERATE 35: CPT

## 2021-06-22 PROCEDURE — 71045 X-RAY EXAM CHEST 1 VIEW: CPT | Mod: 26

## 2021-06-22 RX ORDER — POLYMYXIN B SULFATE 500000 [USP'U]/1
700000 INJECTION, POWDER, LYOPHILIZED, FOR SOLUTION INTRAMUSCULAR; INTRATHECAL; INTRAVENOUS; OPHTHALMIC EVERY 12 HOURS
Refills: 0 | Status: DISCONTINUED | OUTPATIENT
Start: 2021-06-22 | End: 2021-06-29

## 2021-06-22 RX ORDER — MEROPENEM 1 G/30ML
2000 INJECTION INTRAVENOUS EVERY 8 HOURS
Refills: 0 | Status: DISCONTINUED | OUTPATIENT
Start: 2021-06-22 | End: 2021-06-29

## 2021-06-22 RX ORDER — POTASSIUM CHLORIDE 20 MEQ
40 PACKET (EA) ORAL ONCE
Refills: 0 | Status: COMPLETED | OUTPATIENT
Start: 2021-06-22 | End: 2021-06-22

## 2021-06-22 RX ADMIN — TAMSULOSIN HYDROCHLORIDE 0.4 MILLIGRAM(S): 0.4 CAPSULE ORAL at 22:54

## 2021-06-22 RX ADMIN — Medication 40 MILLIGRAM(S): at 06:01

## 2021-06-22 RX ADMIN — Medication 60 MILLIGRAM(S): at 11:56

## 2021-06-22 RX ADMIN — CHLORHEXIDINE GLUCONATE 1 APPLICATION(S): 213 SOLUTION TOPICAL at 06:00

## 2021-06-22 RX ADMIN — ENOXAPARIN SODIUM 40 MILLIGRAM(S): 100 INJECTION SUBCUTANEOUS at 11:56

## 2021-06-22 RX ADMIN — PIPERACILLIN AND TAZOBACTAM 25 GRAM(S): 4; .5 INJECTION, POWDER, LYOPHILIZED, FOR SOLUTION INTRAVENOUS at 13:35

## 2021-06-22 RX ADMIN — Medication 40 MILLIGRAM(S): at 11:55

## 2021-06-22 RX ADMIN — PIPERACILLIN AND TAZOBACTAM 25 GRAM(S): 4; .5 INJECTION, POWDER, LYOPHILIZED, FOR SOLUTION INTRAVENOUS at 05:59

## 2021-06-22 RX ADMIN — Medication 40 MILLIEQUIVALENT(S): at 11:55

## 2021-06-22 RX ADMIN — MEROPENEM 200 MILLIGRAM(S): 1 INJECTION INTRAVENOUS at 22:54

## 2021-06-22 RX ADMIN — LINEZOLID 300 MILLIGRAM(S): 600 INJECTION, SOLUTION INTRAVENOUS at 05:59

## 2021-06-22 RX ADMIN — LINEZOLID 300 MILLIGRAM(S): 600 INJECTION, SOLUTION INTRAVENOUS at 17:42

## 2021-06-22 NOTE — PROGRESS NOTE ADULT - SUBJECTIVE AND OBJECTIVE BOX
Urology Progress Note: 64yo Male s/p right PCN placement by IR on 6/18 with sepsis & hypotension - awaiting PCNL when medically optimal. Pt seen and examined at bedside this morning, pt doing better still on high flow O2. Denies any fevers, N/V, abdominal pain or flank pain.    [ x ] A 10 Point Review of Systems was negative except where noted     MEDICATIONS  (STANDING):  chlorhexidine 4% Liquid 1 Application(s) Topical <User Schedule>  enoxaparin Injectable 40 milliGRAM(s) SubCutaneous daily  furosemide   Injectable 40 milliGRAM(s) IV Push daily  linezolid  IVPB      linezolid  IVPB 600 milliGRAM(s) IV Intermittent every 12 hours  PHENobarbital Injectable 60 milliGRAM(s) IV Push daily  piperacillin/tazobactam IVPB.. 4.5 Gram(s) IV Intermittent every 8 hours  potassium chloride   Powder 40 milliEquivalent(s) Oral once  predniSONE   Tablet 40 milliGRAM(s) Oral daily  tamsulosin 0.4 milliGRAM(s) Oral at bedtime    MEDICATIONS  (PRN):  ALBUTerol    90 MICROgram(s) HFA Inhaler 2 Puff(s) Inhalation every 6 hours PRN Bronchospasm  morphine  - Injectable 4 milliGRAM(s) IV Push every 6 hours PRN Moderate Pain (4 - 6)    ALBUTerol    90 MICROgram(s) HFA Inhaler 2 Puff(s) Inhalation every 6 hours PRN  chlorhexidine 4% Liquid 1 Application(s) Topical <User Schedule>  enoxaparin Injectable 40 milliGRAM(s) SubCutaneous daily  furosemide   Injectable 40 milliGRAM(s) IV Push daily  linezolid  IVPB      linezolid  IVPB 600 milliGRAM(s) IV Intermittent every 12 hours  morphine  - Injectable 4 milliGRAM(s) IV Push every 6 hours PRN  PHENobarbital Injectable 60 milliGRAM(s) IV Push daily  piperacillin/tazobactam IVPB.. 4.5 Gram(s) IV Intermittent every 8 hours  potassium chloride   Powder 40 milliEquivalent(s) Oral once  predniSONE   Tablet 40 milliGRAM(s) Oral daily  tamsulosin 0.4 milliGRAM(s) Oral at bedtime    Vital signs  T(C): , Max: 36.9 (06-21-21 @ 20:05)  HR: 71 (06-22-21 @ 08:32)  BP: 93/53 (06-22-21 @ 08:32)  SpO2: 98% (06-22-21 @ 05:35)    PHYSICAL EXAM:  Constitutional: NAD, well-developed, comfortably laying in bed  HEENT: NC/AT  Back: No CVA tenderness bilaterally, R PCN in place draining clear yellow urine  Respiratory: No accessory respiratory muscle use, on HFNC  Abd: Soft, NT/ND, no suprapubic tenderness to palpation  Cardio: +S1/S2  : bladder not palpable, +SP tube in place draining slightly cloudy urine.  Neurological: Awake and alert  Psychiatric: Normal mood, normal affect  Skin: Good color, non diaphoretic    I&O's Summary  21 Jun 2021 07:01  -  22 Jun 2021 07:00  --------------------------------------------------------  IN: 1800 mL / OUT: 1353 mL / NET: 447 mL    Labs                      12.9   9.76  )-----------( 164      ( 22 Jun 2021 06:22 )             37.5     22 Jun 2021 06:22    143    |  101    |  23     ----------------------------<  133    3.2     |  32     |  0.6      Ca    8.7        22 Jun 2021 06:22  Phos  2.5       22 Jun 2021 06:22  Mg     2.1       22 Jun 2021 06:22

## 2021-06-22 NOTE — PROGRESS NOTE ADULT - SUBJECTIVE AND OBJECTIVE BOX
OVERNIGHT EVENTS: events noted, on HHFNC 40%    Vital Signs Last 24 Hrs  T(C): 36.6 (22 Jun 2021 05:35), Max: 36.9 (21 Jun 2021 20:05)  T(F): 97.9 (22 Jun 2021 05:35), Max: 98.4 (21 Jun 2021 20:05)  HR: 74 (22 Jun 2021 05:35) (73 - 103)  BP: 119/57 (22 Jun 2021 05:35) (102/54 - 119/57)  BP(mean): 82 (21 Jun 2021 20:05) (74 - 82)  RR: 18 (22 Jun 2021 05:35) (18 - 20)  SpO2: 98% (22 Jun 2021 05:35) (96% - 99%)    PHYSICAL EXAMINATION:    GENERAL: ill looking    HEENT: Head is normocephalic and atraumatic.     NECK: Supple.    LUNGS: dec bs boith bases    HEART: MARCIAL 3/6    ABDOMEN: Soft, nontender, and nondistended.      EXTREMITIES: Without any cyanosis, clubbing, rash, lesions or edema.    NEUROLOGIC: NOT FOLLOWING COMMANDS    SKIN: No ulceration or induration present.      LABS:                        12.9   9.76  )-----------( 164      ( 22 Jun 2021 06:22 )             37.5     06-22    143  |  101  |  23<H>  ----------------------------<  133<H>  3.2<L>   |  32  |  0.6<L>    Ca    8.7      22 Jun 2021 06:22  Phos  2.5     06-22  Mg     2.1     06-22    TPro  6.2  /  Alb  3.7  /  TBili  0.3  /  DBili  x   /  AST  21  /  ALT  18  /  AlkPhos  77  06-22 06-21-21 @ 07:01  -  06-22-21 @ 07:00  --------------------------------------------------------  IN: 1800 mL / OUT: 1353 mL / NET: 447 mL        MICROBIOLOGY:  Culture Results:   No growth to date. (06-20 @ 19:24)  Culture Results:   Numerous Pseudomonas aeruginosa (Carbapenem Resistant)  Numerous Staphylococcus aureus  Numerous Actinotignum schaali group "Susceptibilities not performed"  Numerous Proteus mirabilis (06-18 @ 14:23)  Culture Results:   Growth in aerobic and anaerobic bottles: Enterococcus faecalis  Susceptibility to follow.  Growth in aerobic and anaerobic bottles: Pseudomonas aeruginosa  (Carbapenem Resistant)  Growth in anaerobic bottle: Proteus mirabilis Susceptibility to follow.  ***Blood Panel PCR results on this specimen are available  approximately 3 hours after the Gram stain result.***  Gram stain, PCR, and/or culture results may not always  correspond due to difference in methodologies.  ************************************************************  This PCR assay was performed by multiplex PCR. This  Assay tests for 66 bacterial and resistance gene targets.  Please refer to the [facility] test directory  at [email or web address] for details. (06-18 @ 09:29)      MEDICATIONS  (STANDING):  chlorhexidine 4% Liquid 1 Application(s) Topical <User Schedule>  enoxaparin Injectable 40 milliGRAM(s) SubCutaneous daily  furosemide   Injectable 40 milliGRAM(s) IV Push daily  linezolid  IVPB      linezolid  IVPB 600 milliGRAM(s) IV Intermittent every 12 hours  methylPREDNISolone sodium succinate Injectable 40 milliGRAM(s) IV Push every 8 hours  PHENobarbital Injectable 60 milliGRAM(s) IV Push daily  piperacillin/tazobactam IVPB.. 4.5 Gram(s) IV Intermittent every 8 hours  polyethylene glycol 3350 17 Gram(s) Oral two times a day  tamsulosin 0.4 milliGRAM(s) Oral at bedtime    MEDICATIONS  (PRN):  ALBUTerol    90 MICROgram(s) HFA Inhaler 2 Puff(s) Inhalation every 6 hours PRN Bronchospasm  morphine  - Injectable 4 milliGRAM(s) IV Push every 6 hours PRN Moderate Pain (4 - 6)      RADIOLOGY & ADDITIONAL STUDIES:

## 2021-06-22 NOTE — PROGRESS NOTE ADULT - ASSESSMENT
64yo Male s/p right PCN placement by IR on 6/18 with sepsis & hypotension - awaiting PCNL when medically optimal. Pt seen and examined at bedside this morning, pt doing better still on high flow O2. Denies any fevers, N/V, abdominal pain or flank pain.    Assessment:  ·	Nephrolithiasis  ·	Septic shock 2/2 complex UTI, Pyelonephritis  ·	Neurogenic bladder  ·	S/p PCN placement    Plan:  ·	No acute  intervention indicated at this time  ·	Continue IV abx as per ID  ·	When medically optimal IR consult to convert nephrostomy to a nephro-ureteral stent and will then schedule for PCNL  ·	Will discuss with attending 66yo Male s/p right PCN placement by IR on 6/18 with sepsis & hypotension - awaiting PCNL when medically optimal. Pt seen and examined at bedside this morning, pt doing better still on high flow O2. Denies any fevers, N/V, abdominal pain or flank pain.    Assessment:  ·	Nephrolithiasis  ·	Septic shock 2/2 complex UTI, Pyelonephritis  ·	Neurogenic bladder  ·	S/p RIGHT PCN placement    Plan:  ·	Patient seen and examined at bedside with Dr. Cooper, plan is as follows  ·	No acute  intervention indicated at this time  ·	Continue IV abx as per ID  ·	When medically optimal IR consult to convert nephrostomy to a nephro-ureteral stent and will then schedule for PCNL  ·	Urine culture from nephrostomy on 6/18 grew proteus mirabilis; Will obtain repeat urine culture from nephrostomy, will f/u cultures  ·	Will discuss with attending 64yo Male s/p right PCN placement by IR on 6/18 with sepsis & hypotension - awaiting PCNL when medically optimal. Pt seen and examined at bedside this morning, pt doing better still on high flow O2. Denies any fevers, N/V, abdominal pain or flank pain.    Assessment:  ·	Nephrolithiasis  ·	Septic shock 2/2 complex UTI, Pyelonephritis  ·	Neurogenic bladder  ·	S/p RIGHT PCN placement    Plan:  ·	Patient seen and examined at bedside with Dr. Cooper, plan is as follows  ·	No acute  intervention indicated at this time  ·	Continue IV abx as per ID  ·	When medically optimal IR consult to convert nephrostomy to a nephro-ureteral stent and will then schedule for PCNL  ·	Urine culture from nephrostomy on 6/18 grew proteus mirabilis; Will obtain repeat urine culture from nephrostomy, will f/u cultures  ·	F/u ID clearance prior to procedure  ·	Will discuss with attending 66yo Male s/p right PCN placement by IR on 6/18 with sepsis & hypotension - awaiting PCNL when medically optimal. Pt seen and examined at bedside this morning, pt doing better still on high flow O2. Denies any fevers, N/V, abdominal pain or flank pain.    Assessment:  ·	Nephrolithiasis  ·	Septic shock 2/2 complex UTI, Pyelonephritis  ·	Neurogenic bladder  ·	S/p RIGHT PCN placement    Plan:  ·	Patient seen and examined at bedside with Dr. Cooper, plan is as follows  ·	No acute  intervention indicated at this time  ·	Continue IV abx as per ID  ·	When medically optimal IR consult to convert nephrostomy to a nephro-ureteral stent and will then schedule for PCNL  ·	Urine culture from nephrostomy on 6/18 grew proteus mirabilis; Will obtain repeat urine culture from nephrostomy, will f/u cultures  ·	F/u ID clearance prior to procedure  ·	Obtain COVID test 3 prior to procedure   ·	Will discuss with attending 66yo Male s/p right PCN placement by IR on 6/18 with sepsis & hypotension - awaiting PCNL when medically optimal. Pt seen and examined at bedside this morning, pt doing better still on high flow O2. Denies any fevers, N/V, abdominal pain or flank pain.    Assessment:  ·	Nephrolithiasis  ·	Septic shock 2/2 complex UTI, Pyelonephritis  ·	Neurogenic bladder  ·	S/p RIGHT PCN placement    Plan:  ·	Patient seen and examined at bedside with Dr. Cooper, plan is as follows  ·	Continue IV abx as per ID  ·	When medically optimal IR consult to convert nephrostomy to a nephro-ureteral stent - PCNL scheduled tentatively for next Tuesday.  ·	Urine culture from nephrostomy on 6/18 grew proteus mirabilis; Will obtain repeat urine culture from nephrostomy, will f/u cultures  ·	F/u ID, Cardiology and Medical clearances prior to procedure  ·	Obtain COVID test 3 prior to procedure 66yo Male s/p right PCN placement by IR on 6/18 with sepsis & hypotension - awaiting PCNL when medically optimal. Pt seen and examined at bedside this morning, pt doing better still on high flow O2. Denies any fevers, N/V, abdominal pain or flank pain.    Assessment:  ·	Nephrolithiasis  ·	Septic shock 2/2 complex UTI, Pyelonephritis  ·	Neurogenic bladder  ·	S/p RIGHT PCN placement    Plan:  ·	Patient seen and examined at bedside with Dr. Cooper, plan is as follows  ·	Continue IV abx as per ID  ·	When medically optimal IR consult to convert nephrostomy to a nephro-ureteral stent via mid pole calyx- PCNL scheduled tentatively for next Tuesday.  ·	Urine culture from nephrostomy on 6/18 grew proteus mirabilis; Will obtain repeat urine culture from nephrostomy, will f/u cultures  ·	F/u ID, Cardiology and Medical clearances prior to procedure  ·	Obtain COVID test 3 prior to procedure  ·	spoke to Dr. Garduno- he will let us know but he th inks next tuesday should be OK  ·	spoke to aides they do not have the capacity for IV abs so he will need to stay here or go to a SNF until the stones are dealt with

## 2021-06-22 NOTE — PROGRESS NOTE ADULT - SUBJECTIVE AND OBJECTIVE BOX
TISH SHAIKH  65y, Male  Allergy: No Known Allergies      LOS  5d    CHIEF COMPLAINT: Nephrolithiasis renal and ureteral (22 Jun 2021 10:45)      INTERVAL EVENTS/HPI  - No acute events overnight  - T(F): , Max: 99.3 (06-22-21 @ 16:00)  - Denies any worsening symptoms  - Tolerating medication  - WBC Count: 9.76 (06-22-21 @ 06:22)  WBC Count: 10.56 (06-21-21 @ 07:17)     - Creatinine, Serum: 0.6 (06-22-21 @ 06:22)  Creatinine, Serum: 0.8 (06-21-21 @ 07:17)       ROS  General: Denies rigors, nightsweats  HEENT: Denies headache, rhinorrhea, sore throat, eye pain  CV: Denies CP, palpitations  PULM: Denies wheezing, hemoptysis  GI: Denies hematemesis, hematochezia, melena  : Denies discharge, hematuria  MSK: Denies arthralgias, myalgias  SKIN: Denies rash, lesions  NEURO: Denies paresthesias, weakness  PSYCH: Denies depression, anxiety    VITALS:  T(F): 99.3, Max: 99.3 (06-22-21 @ 16:00)  HR: 79  BP: 108/57  RR: 18Vital Signs Last 24 Hrs  T(C): 37.4 (22 Jun 2021 16:00), Max: 37.4 (22 Jun 2021 16:00)  T(F): 99.3 (22 Jun 2021 16:00), Max: 99.3 (22 Jun 2021 16:00)  HR: 79 (22 Jun 2021 16:00) (71 - 103)  BP: 108/57 (22 Jun 2021 16:00) (88/50 - 119/57)  BP(mean): 75 (22 Jun 2021 16:00) (64 - 82)  RR: 18 (22 Jun 2021 16:00) (18 - 78)  SpO2: 96% (22 Jun 2021 13:07) (96% - 99%)    PHYSICAL EXAM:  Gen: NAD, resting in bed chronically ill appearing   HEENT: Normocephalic, atraumatic  Neck: supple, no lymphadenopathy  CV: Regular rate & regular rhythm  Lungs: decreased BS at bases, no fremitus  Abdomen: Soft, BS present PCN SPC  Ext: Warm, well perfused  Neuro: non focal, awake  Skin: no rash, no erythema  Lines: no phlebitis    FH: Non-contributory  Social Hx: Non-contributory    TESTS & MEASUREMENTS:                        12.9   9.76  )-----------( 164      ( 22 Jun 2021 06:22 )             37.5     06-22    143  |  101  |  23<H>  ----------------------------<  133<H>  3.2<L>   |  32  |  0.6<L>    Ca    8.7      22 Jun 2021 06:22  Phos  2.5     06-22  Mg     2.1     06-22    TPro  6.2  /  Alb  3.7  /  TBili  0.3  /  DBili  x   /  AST  21  /  ALT  18  /  AlkPhos  77  06-22    eGFR if Non : 106 mL/min/1.73M2 (06-22-21 @ 06:22)  eGFR if African American: 122 mL/min/1.73M2 (06-22-21 @ 06:22)    LIVER FUNCTIONS - ( 22 Jun 2021 06:22 )  Alb: 3.7 g/dL / Pro: 6.2 g/dL / ALK PHOS: 77 U/L / ALT: 18 U/L / AST: 21 U/L / GGT: x               Culture - Blood (collected 06-20-21 @ 19:24)  Source: .Blood Blood-Peripheral  Preliminary Report (06-22-21 @ 01:02):    No growth to date.    Culture - Urine (collected 06-18-21 @ 14:23)  Source: Kidney Right Kidney  Final Report (06-22-21 @ 08:46):    Numerous Pseudomonas aeruginosa (Carbapenem Resistant) Multiple    Morphological Strains    Numerous Proteus mirabilis ESBL    Numerous Staphylococcus aureus    Numerous Actinotignum schaali group "Susceptibilities not performed"  Organism: Pseudomonas aeruginosa (Carbapenem Resistant)  Proteus mirabilis ESBL  Enterococcus faecalis  Staphylococcus aureus (06-22-21 @ 08:46)  Organism: Staphylococcus aureus (06-22-21 @ 08:46)      -  Ampicillin/Sulbactam: S <=8/4      -  Cefazolin: S <=4      -  Gentamicin: S <=1 Should not be used as monotherapy      -  Oxacillin: S 1      -  Penicillin: R >8      -  RIF- Rifampin: S <=1 Should not be used as monotherapy      -  Tetra/Doxy: S <=1      -  Trimethoprim/Sulfamethoxazole: S <=0.5/9.5      -  Vancomycin: S 2      Method Type: MIGUEL A  Organism: Enterococcus faecalis (06-22-21 @ 08:46)      -  Ampicillin: S <=2 Predicts results to ampicillin/sulbactam, amoxacillin-clavulanate and  piperacillin-tazobactam.      -  Ciprofloxacin: S <=1      -  Levofloxacin: S <=1      -  Tetra/Doxy: R >8      -  Vancomycin: S 2      Method Type: MIGUEL A  Organism: Proteus mirabilis ESBL (06-22-21 @ 08:46)      -  Amikacin: S <=16      -  Amoxicillin/Clavulanic Acid: S <=8/4      -  Ampicillin: R >16 These ampicillin results predict results for amoxicillin      -  Ampicillin/Sulbactam: R 8/4 Enterobacter, Citrobacter, and Serratia may develop resistance during prolonged therapy (3-4 days)      -  Aztreonam: R >16      -  Cefazolin: R >16 ([id number]) For uncomplicated UTI with K. pneumoniae, E. coli, or P. mirablis: MIGUEL A <=16 is sensitive and MIGUEL A >=32 is resistant. This also predicts results for oral agents cefaclor, cefdinir, cefpodoxime, cefprozil, cefuroxime axetil, cephalexin and locarbef for uncomplicated UTI. Note that some isolates may be susceptible to these agents while testing resistant to cefazolin.      -  Cefepime: R >16      -  Cefoxitin: S <=8      -  Ceftriaxone: R >32 Enterobacter, Citrobacter, and Serratia may develop resistance during prolonged therapy      -  Ciprofloxacin: R >2      -  Ertapenem: S <=0.5      -  Gentamicin: S <=2      -  Levofloxacin: R >4      -  Meropenem: S <=1      -  Piperacillin/Tazobactam: R <=8      -  Tobramycin: S <=2      -  Trimethoprim/Sulfamethoxazole: R >2/38      Method Type: MIGUEL A  Organism: Pseudomonas aeruginosa (Carbapenem Resistant) (06-22-21 @ 08:46)      -  Amikacin: S <=16      -  Aztreonam: S <=4      -  Cefepime: S 8      -  Ceftazidime: S 8      -  Ciprofloxacin: R >2      -  Gentamicin: S 4      -  Imipenem: R >8      -  Levofloxacin: R >4      -  Meropenem: I 4      -  Piperacillin/Tazobactam: I 32      -  Tobramycin: S <=2      Method Type: MIGUEL A    Culture - Blood (collected 06-18-21 @ 09:29)  Source: .Blood None  Gram Stain (06-19-21 @ 07:48):    Upon re-evaluation of gram stain:    Growth in aerobic and anaerobic bottles: Gram Negative Rods and Gram    Positive Cocci in Pairs and Chains  Final Report (06-22-21 @ 12:03):    Growth in aerobic and anaerobic bottles: Enterococcus faecalis    Growth in aerobic and anaerobic bottles: Pseudomonas aeruginosa    (Carbapenem Resistant)    Growth in anaerobic bottle: Proteus mirabilis ESBL    ***Blood Panel PCR results on this specimenare available    approximately 3 hours after the Gram stain result.***    Gram stain, PCR, and/or culture results may not always    correspond due to difference in methodologies.    ************************************************************    This PCR assaywas performed by multiplex PCR. This    Assay tests for 66 bacterial and resistance gene targets.    Please refer to the [facility] test directory    at [email or web address] for details.  Organism: Blood Culture PCR  Blood Culture PCR  Enterococcus faecalis  Pseudomonas aeruginosa (Carbapenem Resistant)  Proteus mirabilis ESBL (06-22-21 @ 12:07)  Organism: Proteus mirabilis ESBL (06-22-21 @ 12:07)      -  Amikacin: R >32      -  Ampicillin: R >16 These ampicillin results predict results for amoxicillin      -  Ampicillin/Sulbactam: R 8/4 Enterobacter, Citrobacter, and Serratia may develop resistance during prolonged therapy (3-4 days)      -  Aztreonam: R >16      -  Cefazolin: R >16 Enterobacter, Citrobacter, and Serratia may develop resistance during prolonged therapy (3-4 days)      -  Cefepime: R >16      -  Cefoxitin: R >16      -  Ceftazidime/Avibactam: R >16      -  Ceftolozane/tazobactam: S <=2      -  Ceftriaxone: R >32 Enterobacter, Citrobacter, and Serratia may develop resistance during prolonged therapy      -  Ciprofloxacin: R >2      -  Ertapenem: R >1      -  Gentamicin: I 8      -  Levofloxacin: R >4      -  Meropenem: I 2      -  Piperacillin/Tazobactam: R <=8      -  Tobramycin: S <=2      -  Trimethoprim/Sulfamethoxazole: R >2/38      Method Type: MIGUEL A  Organism: Pseudomonas aeruginosa (Carbapenem Resistant) (06-22-21 @ 12:07)      -  Amikacin: S <=16      -  Aztreonam: S 8      -  Cefepime: I 16      -  Ceftazidime: I 16      -  Ciprofloxacin: R >2      -  Gentamicin: S 4      -  Imipenem: R >8      -  Levofloxacin: R >4      -  Meropenem: R 8      -  Piperacillin/Tazobactam: I 64      -  Tobramycin: S <=2      Method Type: MIGUEL A  Organism: Enterococcus faecalis (06-22-21 @ 12:06)      -  Ampicillin: S <=2 Predicts results to ampicillin/sulbactam, amoxacillin-clavulanate and  piperacillin-tazobactam.      -  Gentamicin synergy: S      -  Vancomycin: S 2      Method Type: MIGUEL A  Organism: Blood Culture PCR (06-22-21 @ 12:04)      -  Pseudomonas aeruginosa: Detec      Method Type: PCR  Organism: Blood Culture PCR (06-22-21 @ 12:04)      -  Enterococcus faecalis,VRE: Detec      Method Type: PCR    Culture - Urine (collected 06-17-21 @ 16:27)  Source: .Urine Clean Catch (Midstream)  Final Report (06-21-21 @ 11:36):    >=3 organisms. Probable collection contamination.        Lactate, Blood: 1.7 mmol/L (06-20-21 @ 09:10)  Lactate, Blood: 2.0 mmol/L (06-19-21 @ 07:11)  Lactate, Blood: 2.3 mmol/L (06-18-21 @ 22:02)  Lactate, Blood: 1.8 mmol/L (06-18-21 @ 09:29)      INFECTIOUS DISEASES TESTING  COVID-19 PCR: NotDetec (06-17-21 @ 22:22)  Rapid RVP Result: NotDetec (04-01-21 @ 11:14)  COVID-19 PCR: NotDetec (09-14-20 @ 15:45)  COVID-19 PCR: NotDetec (09-08-20 @ 14:19)      INFLAMMATORY MARKERS  C-Reactive Protein, Serum: 120 mg/L (06-18-21 @ 09:29)      RADIOLOGY & ADDITIONAL TESTS:  I have personally reviewed the last available Chest xray  CXR      CT      CARDIOLOGY TESTING  12 Lead ECG:   Ventricular Rate 85 BPM    Atrial Rate 85 BPM    P-R Interval 122 ms    QRS Duration 80 ms    Q-T Interval 380 ms    QTC Calculation(Bazett) 452 ms    P Axis 46 degrees    R Axis 22 degrees    T Axis -10 degrees    Diagnosis Line Normal sinus rhythm  Nonspecific T wave abnormality minor  Otherwise normal ECG    Confirmed by YESICA SALAZAR MD ([id number]) on 6/19/2021 10:46:39 AM (06-19-21 @ 07:53)  12 Lead ECG:   Ventricular Rate 112 BPM    Atrial Rate 112 BPM    P-R Interval 120 ms    QRS Duration 84 ms    Q-T Interval 348 ms    QTC Calculation(Bazett) 475 ms    P Axis 45 degrees    R Axis 36 degrees    T Axis 4 degrees    Diagnosis Line Sinus tachycardia  Otherwise normal ECG    Confirmed by EBENEZER EDDY MD [id number]) on 6/18/2021 4:15:45 PM (06-18-21 @ 14:35)      MEDICATIONS  chlorhexidine 4% Liquid 1 Topical <User Schedule>  enoxaparin Injectable 40 SubCutaneous daily  furosemide   Injectable 40 IV Push daily  linezolid  IVPB     linezolid  IVPB 600 IV Intermittent every 12 hours  PHENobarbital Injectable 60 IV Push daily  piperacillin/tazobactam IVPB.. 4.5 IV Intermittent every 8 hours  predniSONE   Tablet 40 Oral daily  tamsulosin 0.4 Oral at bedtime      WEIGHT  Weight (kg): 57.4 (06-18-21 @ 05:00)  Creatinine, Serum: 0.6 mg/dL (06-22-21 @ 06:22)      ANTIBIOTICS:  linezolid  IVPB      linezolid  IVPB 600 milliGRAM(s) IV Intermittent every 12 hours  piperacillin/tazobactam IVPB.. 4.5 Gram(s) IV Intermittent every 8 hours      All available historical records have been reviewed

## 2021-06-22 NOTE — PROGRESS NOTE ADULT - SUBJECTIVE AND OBJECTIVE BOX
Hospital Day:  5d    Subjective: Patient is a 65y old  Male who presents with a chief complaint of Nephrolithiasis (2021 08:03)      Pt seen and evaluated at bedside.   Complaints: Pt reports no pain or complaints overnight.   Over the night Events: None    Past Medical Hx:   BPH (benign prostatic hyperplasia)    Cerebral palsy    Seizure    Osteoporosis    Spastic quadriplegia    Urinary calculi    Urinary retention    Asthma      Past Sx:  S/P percutaneous endoscopic gastrostomy (PEG) tube placement    H/O cystoscopy    History of suprapubic catheter    Suprapubic catheter      Allergies:  No Known Allergies    Current Meds:   Standng Meds:  chlorhexidine 4% Liquid 1 Application(s) Topical <User Schedule>  enoxaparin Injectable 40 milliGRAM(s) SubCutaneous daily  furosemide   Injectable 40 milliGRAM(s) IV Push daily  linezolid  IVPB      linezolid  IVPB 600 milliGRAM(s) IV Intermittent every 12 hours  PHENobarbital Injectable 60 milliGRAM(s) IV Push daily  piperacillin/tazobactam IVPB.. 4.5 Gram(s) IV Intermittent every 8 hours  potassium chloride   Powder 40 milliEquivalent(s) Oral once  predniSONE   Tablet 40 milliGRAM(s) Oral daily  tamsulosin 0.4 milliGRAM(s) Oral at bedtime    PRN Meds:  ALBUTerol    90 MICROgram(s) HFA Inhaler 2 Puff(s) Inhalation every 6 hours PRN Bronchospasm  morphine  - Injectable 4 milliGRAM(s) IV Push every 6 hours PRN Moderate Pain (4 - 6)      Vital Signs:   T(F): 97.9 (21 @ 08:32), Max: 98.4 (21 @ 20:05)  HR: 71 (21 @ 08:32) (71 - 103)  BP: 93/53 (21 @ 08:32) (93/53 - 119/57)  RR: 18 (21 @ 08:32) (18 - 20)  SpO2: 98% (21 @ 05:35) (96% - 99%)    Physical Exam:   GENERAL: NAD, Resting in bed  HEENT: Full ROM in bilateral eyes  CHEST/LUNG: Clear breath sounds in all lung fields; decreased sounds in bibasilar lobes; no wheezing noted  HEART: S1 S2 rate and rhythm; No murmurs, rubs, or gallops  ABDOMEN: Bowel sounds present; Soft, Nontender to palpation, Nondistended.   EXTREMITIES:  No clubbing; non edematous, well perfused LE  : no hematuria in drainage; no blood in nephrostomy drain; suprapubic Edouard site is clean    FLUID BALANCE    21 @ 07:01  -  21 @ 07:00  --------------------------------------------------------  IN: 1250 mL / OUT: 5153 mL / NET: -3903 mL    21 @ 07:  -  21 @ 07:00  --------------------------------------------------------  IN: 1800 mL / OUT: 1353 mL / NET: 447 mL        Labs:                         12.9   9.76  )-----------( 164      ( 2021 06:22 )             37.5     Neutophil% 74.4, Lymphocyte% 14.8, Monocyte% 10.0, Bands% 0.7 21 @ 06:22    2021 06:22    143    |  101    |  23     ----------------------------<  133    3.2     |  32     |  0.6      Ca    8.7        2021 06:22  Phos  2.5       2021 06:22  Mg     2.1       2021 06:22    TPro  6.2    /  Alb  3.7    /  TBili  0.3    /  DBili  x      /  AST  21     /  ALT  18     /  AlkPhos  77     2021 06:22        Amylase --, Lipase , 21 @ 16:27        Troponin 0.03, CKMB --, CK -- 21 @ 07:11  Troponin 0.05, CKMB --, CK -- 21 @ 01:49  Troponin 0.13, CKMB 4.0,  21 @ 20:18  Troponin 0.01, CKMB --, CK -- 21 @ 09:29        Urinalysis Basic - ( 2021 20:30 )    Color: Dark Brown / Appearance: Turbid / S.024 / pH: x  Gluc: x / Ketone: Negative  / Bili: Negative / Urobili: <2 mg/dL   Blood: x / Protein: 300 mg/dL / Nitrite: Negative   Leuk Esterase: Moderate / RBC: >720 /HPF / WBC 10 /HPF   Sq Epi: x / Non Sq Epi: 13 /HPF / Bacteria: Negative          Culture - Blood (collected 21 @ 19:24)  Source: .Blood Blood-Peripheral  Preliminary Report (21 @ 01:02):    No growth to date.    Culture - Blood (collected 21 @ 09:29)  Source: .Blood None  Gram Stain (21 @ 07:48):    Upon re-evaluation of gram stain:    Growth in aerobic and anaerobic bottles: Gram Negative Rods and Gram    Positive Cocci in Pairs and Chains  Preliminary Report (21 @ 13:28):    Growth in aerobic and anaerobic bottles: Enterococcus faecalis    Susceptibility to follow.    Growth in aerobic and anaerobic bottles: Pseudomonas aeruginosa    (Carbapenem Resistant)    Growth in anaerobic bottle: Proteus mirabilis Susceptibility to follow.    ***Blood Panel PCR results on this specimen are available    approximately 3 hours after the Gram stain result.***    Gram stain, PCR, and/or culture results may not always    correspond due to difference in methodologies.    ************************************************************    This PCR assay was performed by multiplex PCR. This    Assay tests for 66 bacterial and resistance gene targets.    Please refer to the [facility] test directory    at [email or web address] for details.  Organism: Blood Culture PCR  Blood Culture PCR  Pseudomonas aeruginosa (Carbapenem Resistant) (21 @ 09:52)  Organism: Pseudomonas aeruginosa (Carbapenem Resistant) (21 @ 09:52)      -  Amikacin: S <=16      -  Aztreonam: S 8      -  Cefepime: I 16      -  Ceftazidime: I 16      -  Ciprofloxacin: R >2      -  Gentamicin: S 4      -  Imipenem: R >8      -  Levofloxacin: R >4      -  Meropenem: R 8      -  Piperacillin/Tazobactam: I 64      -  Tobramycin: S <=2      Method Type: MIGUEL A  Organism: Blood Culture PCR (21 @ 07:52)      -  Pseudomonas aeruginosa: Detec      Method Type: PCR  Organism: Blood Culture PCR (21 @ 07:34)      -  Enterococcus faecalis,VRE: Detec      Method Type: PCR          Ferritin, Serum: 107 ng/mL (21 @ 09:29)    C-Reactive Protein, Serum: 120 mg/L (21 @ 09:29)        Radiology:  Hospital Day:  5d    Subjective: Patient is a 65y old  Male who presents with a chief complaint of Nephrolithiasis (2021 08:03)      Pt seen and evaluated at bedside.   Complaints: Pt reports no pain or complaints overnight.   Over the night Events: None    Past Medical Hx:   BPH (benign prostatic hyperplasia)    Cerebral palsy    Seizure    Osteoporosis    Spastic quadriplegia    Urinary calculi    Urinary retention    Asthma      Past Sx:  S/P percutaneous endoscopic gastrostomy (PEG) tube placement    H/O cystoscopy    History of suprapubic catheter    Suprapubic catheter      Allergies:  No Known Allergies    Current Meds:   Standng Meds:  chlorhexidine 4% Liquid 1 Application(s) Topical <User Schedule>  enoxaparin Injectable 40 milliGRAM(s) SubCutaneous daily  furosemide   Injectable 40 milliGRAM(s) IV Push daily  linezolid  IVPB      linezolid  IVPB 600 milliGRAM(s) IV Intermittent every 12 hours  PHENobarbital Injectable 60 milliGRAM(s) IV Push daily  piperacillin/tazobactam IVPB.. 4.5 Gram(s) IV Intermittent every 8 hours  potassium chloride   Powder 40 milliEquivalent(s) Oral once  predniSONE   Tablet 40 milliGRAM(s) Oral daily  tamsulosin 0.4 milliGRAM(s) Oral at bedtime    PRN Meds:  ALBUTerol    90 MICROgram(s) HFA Inhaler 2 Puff(s) Inhalation every 6 hours PRN Bronchospasm  morphine  - Injectable 4 milliGRAM(s) IV Push every 6 hours PRN Moderate Pain (4 - 6)      Vital Signs:   T(F): 97.9 (21 @ 08:32), Max: 98.4 (21 @ 20:05)  HR: 71 (21 @ 08:32) (71 - 103)  BP: 93/53 (21 @ 08:32) (93/53 - 119/57)  RR: 18 (21 @ 08:32) (18 - 20)  SpO2: 98% (21 @ 05:35) (96% - 99%)    Physical Exam:   GENERAL: NAD, Resting in bed  HEENT: Full ROM in bilateral eyes  CHEST/LUNG: Clear breath sounds in all lung fields; decreased sounds in bibasilar lobes; no wheezing noted  HEART: S1 S2 rate and rhythm; No murmurs, rubs, or gallops  ABDOMEN: Bowel sounds present; Soft, Nontender to palpation, Nondistended.   EXTREMITIES:  No clubbing; non edematous, well perfused LE  : no hematuria in drainage; no blood in nephrostomy drain; suprapubic Edouard site is clean    FLUID BALANCE    21 @ 07:01  -  21 @ 07:00  --------------------------------------------------------  IN: 1250 mL / OUT: 5153 mL / NET: -3903 mL    21 @ 07:  -  21 @ 07:00  --------------------------------------------------------  IN: 1800 mL / OUT: 1353 mL / NET: 447 mL        Labs:                         12.9   9.76  )-----------( 164      ( 2021 06:22 )             37.5     Neutophil% 74.4, Lymphocyte% 14.8, Monocyte% 10.0, Bands% 0.7 21 @ 06:22    2021 06:22    143    |  101    |  23     ----------------------------<  133    3.2     |  32     |  0.6      Ca    8.7        2021 06:22  Phos  2.5       2021 06:22  Mg     2.1       2021 06:22    TPro  6.2    /  Alb  3.7    /  TBili  0.3    /  DBili  x      /  AST  21     /  ALT  18     /  AlkPhos  77     2021 06:22        Amylase --, Lipase , 21 @ 16:27        Troponin 0.03, CKMB --, CK -- 21 @ 07:11  Troponin 0.05, CKMB --, CK -- 21 @ 01:49  Troponin 0.13, CKMB 4.0,  21 @ 20:18  Troponin 0.01, CKMB --, CK -- 21 @ 09:29        Urinalysis Basic - ( 2021 20:30 )    Color: Dark Brown / Appearance: Turbid / S.024 / pH: x  Gluc: x / Ketone: Negative  / Bili: Negative / Urobili: <2 mg/dL   Blood: x / Protein: 300 mg/dL / Nitrite: Negative   Leuk Esterase: Moderate / RBC: >720 /HPF / WBC 10 /HPF   Sq Epi: x / Non Sq Epi: 13 /HPF / Bacteria: Negative          Culture - Blood (collected 21 @ 19:24)  Source: .Blood Blood-Peripheral  Preliminary Report (21 @ 01:02):    No growth to date.    Culture - Blood (collected 21 @ 09:29)  Source: .Blood None  Gram Stain (21 @ 07:48):    Upon re-evaluation of gram stain:    Growth in aerobic and anaerobic bottles: Gram Negative Rods and Gram    Positive Cocci in Pairs and Chains  Preliminary Report (21 @ 13:28):    Growth in aerobic and anaerobic bottles: Enterococcus faecalis    Susceptibility to follow.    Growth in aerobic and anaerobic bottles: Pseudomonas aeruginosa    (Carbapenem Resistant)    Growth in anaerobic bottle: Proteus mirabilis Susceptibility to follow.    ***Blood Panel PCR results on this specimen are available    approximately 3 hours after the Gram stain result.***    Gram stain, PCR, and/or culture results may not always    correspond due to difference in methodologies.    ************************************************************    This PCR assay was performed by multiplex PCR. This    Assay tests for 66 bacterial and resistance gene targets.    Please refer to the [facility] test directory    at [email or web address] for details.  Organism: Blood Culture PCR  Blood Culture PCR  Pseudomonas aeruginosa (Carbapenem Resistant) (21 @ 09:52)  Organism: Pseudomonas aeruginosa (Carbapenem Resistant) (21 @ 09:52)      -  Amikacin: S <=16      -  Aztreonam: S 8      -  Cefepime: I 16      -  Ceftazidime: I 16      -  Ciprofloxacin: R >2      -  Gentamicin: S 4      -  Imipenem: R >8      -  Levofloxacin: R >4      -  Meropenem: R 8      -  Piperacillin/Tazobactam: I 64      -  Tobramycin: S <=2      Method Type: MIGUEL A  Organism: Blood Culture PCR (21 @ 07:52)      -  Pseudomonas aeruginosa: Detec      Method Type: PCR  Organism: Blood Culture PCR (21 @ 07:34)      -  Enterococcus faecalis,VRE: Detec      Method Type: PCR          Ferritin, Serum: 107 ng/mL (21 @ 09:29)    C-Reactive Protein, Serum: 120 mg/L (21 @ 09:29)        Radiology:      Hospital Day:  5d    Subjective: Patient is a 65y old  Male who presents with a chief complaint of Nephrolithiasis (2021 08:03)      Pt seen and evaluated at bedside.   Complaints: Pt reports no pain or complaints overnight.   Over the night Events: None    Past Medical Hx:   BPH (benign prostatic hyperplasia)    Cerebral palsy    Seizure    Osteoporosis    Spastic quadriplegia    Urinary calculi    Urinary retention    Asthma      Past Sx:  S/P percutaneous endoscopic gastrostomy (PEG) tube placement    H/O cystoscopy    History of suprapubic catheter    Suprapubic catheter      Allergies:  No Known Allergies    Current Meds:   Standng Meds:  chlorhexidine 4% Liquid 1 Application(s) Topical <User Schedule>  enoxaparin Injectable 40 milliGRAM(s) SubCutaneous daily  furosemide   Injectable 40 milliGRAM(s) IV Push daily  linezolid  IVPB      linezolid  IVPB 600 milliGRAM(s) IV Intermittent every 12 hours  PHENobarbital Injectable 60 milliGRAM(s) IV Push daily  piperacillin/tazobactam IVPB.. 4.5 Gram(s) IV Intermittent every 8 hours  potassium chloride   Powder 40 milliEquivalent(s) Oral once  predniSONE   Tablet 40 milliGRAM(s) Oral daily  tamsulosin 0.4 milliGRAM(s) Oral at bedtime    PRN Meds:  ALBUTerol    90 MICROgram(s) HFA Inhaler 2 Puff(s) Inhalation every 6 hours PRN Bronchospasm  morphine  - Injectable 4 milliGRAM(s) IV Push every 6 hours PRN Moderate Pain (4 - 6)      Vital Signs:   T(F): 97.9 (21 @ 08:32), Max: 98.4 (21 @ 20:05)  HR: 71 (21 @ 08:32) (71 - 103)  BP: 93/53 (21 @ 08:32) (93/53 - 119/57)  RR: 18 (21 @ 08:32) (18 - 20)  SpO2: 98% (21 @ 05:35) (96% - 99%)    Physical Exam:   GENERAL: NAD, Resting in bed  HEENT: Full ROM in bilateral eyes  CHEST/LUNG: Clear breath sounds in all lung fields; decreased sounds in bibasilar lobes; no wheezing noted  HEART: S1 S2 rate and rhythm; No murmurs, rubs, or gallops  ABDOMEN: Bowel sounds present; Soft, Nontender to palpation, Nondistended.   EXTREMITIES:  No clubbing; non edematous, well perfused LE  : no hematuria in drainage; no blood in nephrostomy drain; suprapubic Edouard site is clean    FLUID BALANCE    21 @ 07:01  -  21 @ 07:00  --------------------------------------------------------  IN: 1250 mL / OUT: 5153 mL / NET: -3903 mL    21 @ 07:  -  21 @ 07:00  --------------------------------------------------------  IN: 1800 mL / OUT: 1353 mL / NET: 447 mL        Labs:                         12.9   9.76  )-----------( 164      ( 2021 06:22 )             37.5     Neutophil% 74.4, Lymphocyte% 14.8, Monocyte% 10.0, Bands% 0.7 21 @ 06:22    2021 06:22    143    |  101    |  23     ----------------------------<  133    3.2     |  32     |  0.6      Ca    8.7        2021 06:22  Phos  2.5       2021 06:22  Mg     2.1       2021 06:22    TPro  6.2    /  Alb  3.7    /  TBili  0.3    /  DBili  x      /  AST  21     /  ALT  18     /  AlkPhos  77     2021 06:22        Amylase --, Lipase , 21 @ 16:27        Troponin 0.03, CKMB --, CK -- 21 @ 07:11  Troponin 0.05, CKMB --, CK -- 21 @ 01:49  Troponin 0.13, CKMB 4.0,  21 @ 20:18  Troponin 0.01, CKMB --, CK -- 21 @ 09:29        Urinalysis Basic - ( 2021 20:30 )    Color: Dark Brown / Appearance: Turbid / S.024 / pH: x  Gluc: x / Ketone: Negative  / Bili: Negative / Urobili: <2 mg/dL   Blood: x / Protein: 300 mg/dL / Nitrite: Negative   Leuk Esterase: Moderate / RBC: >720 /HPF / WBC 10 /HPF   Sq Epi: x / Non Sq Epi: 13 /HPF / Bacteria: Negative          Culture - Blood (collected 21 @ 19:24)  Source: .Blood Blood-Peripheral  Preliminary Report (21 @ 01:02):    No growth to date.    Culture - Blood (collected 21 @ 09:29)  Source: .Blood None  Gram Stain (21 @ 07:48):    Upon re-evaluation of gram stain:    Growth in aerobic and anaerobic bottles: Gram Negative Rods and Gram    Positive Cocci in Pairs and Chains  Preliminary Report (21 @ 13:28):    Growth in aerobic and anaerobic bottles: Enterococcus faecalis    Susceptibility to follow.    Growth in aerobic and anaerobic bottles: Pseudomonas aeruginosa    (Carbapenem Resistant)    Growth in anaerobic bottle: Proteus mirabilis Susceptibility to follow.    ***Blood Panel PCR results on this specimen are available    approximately 3 hours after the Gram stain result.***    Gram stain, PCR, and/or culture results may not always    correspond due to difference in methodologies.    ************************************************************    This PCR assay was performed by multiplex PCR. This    Assay tests for 66 bacterial and resistance gene targets.    Please refer to the [facility] test directory    at [email or web address] for details.  Organism: Blood Culture PCR  Blood Culture PCR  Pseudomonas aeruginosa (Carbapenem Resistant) (21 @ 09:52)  Organism: Pseudomonas aeruginosa (Carbapenem Resistant) (21 @ 09:52)      -  Amikacin: S <=16      -  Aztreonam: S 8      -  Cefepime: I 16      -  Ceftazidime: I 16      -  Ciprofloxacin: R >2      -  Gentamicin: S 4      -  Imipenem: R >8      -  Levofloxacin: R >4      -  Meropenem: R 8      -  Piperacillin/Tazobactam: I 64      -  Tobramycin: S <=2      Method Type: MIGUEL A  Organism: Blood Culture PCR (21 @ 07:52)      -  Pseudomonas aeruginosa: Detec      Method Type: PCR  Organism: Blood Culture PCR (21 @ 07:34)      -  Enterococcus faecalis,VRE: Detec      Method Type: PCR               Ferritin, Serum: 107 ng/mL (21 @ 09:29)    C-Reactive Protein, Serum: 120 mg/L (21 @ 09:29)        Radiology:

## 2021-06-22 NOTE — PROGRESS NOTE ADULT - ASSESSMENT
IMPRESSION:    Sepsis present on admission better  Septic shock, improving  Obstructive pyelonephritis s/p Right PCN  Pseudomonas & E. faecalis VRE bacteremia  Chronic suprapubic alatorre  Seizure yesterday? HO Seizures  HO Cerebral palsy chronically contracted      PLAN:    CNS:  Avoid CNS depressants.  FU EEG.  Check Phenobarbital trough, c/w phenobarbital.  Neuro FU    HEENT:  Oral care.  Aspiration precautions.    PULMONARY:  HOB @ 45 degrees. CXR today.  Wean O2 as tolerated, Goal 88 to 94%, NS trial    CARDIOVASCULAR:  Lasix IV 40mg daily    GI: GI prophylaxis. PEG feeds.     RENAL: FU lytes.  Correct as needed.  Monitor UO.  Monitor nephrostomy drain.    INFECTIOUS DISEASE:  Linezolid and Zosyn.  ID following.   Repeat blood cultures.    HEMATOLOGICAL:  DVT prophylaxis.    ENDOCRINE:  Follow up FS.  Insulin protocol if needed.    MUSCULOSKELETAL: bed rest    CODE STATUS: FULL CODE    DISPOSITION: SDU

## 2021-06-22 NOTE — PROGRESS NOTE ADULT - ASSESSMENT
ASSESSMENT  64 years old Male with PMHx of Cerebral Palsy, Seizure disorder, spastic paraplegia s/p PEG, BPH, Hx of Nephrolithiasis s/p pcnl x 2 with known incompetent bladder neck s/p SPT presents to ED with Right sided flank pain. Patient had Right-sided PCNL 5/20.    IMPRESSION  #Septic shock requiring pressors secondary to Polymicrobial bacteremia with Pseudomonas and VRE secondary to pyelonephritis secondary to obstructing stone    s/p SPT exchange and R PCN    R kidney CX   Numerous Pseudomonas aeruginosa (Carbapenem Resistant) Multiple Morphological Strains    Numerous Proteus mirabilis ESBL    Numerous Staphylococcus aureus- MSSA    Numerous Actinotignum schaali group "Susceptibilities not performed"    6/18 BCX Pseudomonas, Enterococcus faecalis VRE    UA  WBC 10     CTAP 1.  Obstructing proximal right ureteral stone with moderate hydroureteronephrosis.  2.  Bladder wall thickening. Correlate with urinalysis.  #CT Bibasilar atelectasis.  Creatinine, Serum: 0.8 (06-19-21 @ 07:11)    Weight (kg): 57.4 (06-18-21 @ 05:00)    RECOMMENDATIONS  - Contact isolation CRE  - Meropenem 2g q8h IV  - Polymyxin 700,000 units q12h IV. monitor Cr & lytes   - D/C linezolid, D/C zosyn  - Repeat BCX  - Appreciate Urology consult- s/p SPT exchange 6/18  - Appreciate IR consult: s/p Percutaneous right nephrostomy  - Repeat BCX are NG, would given at least 72h on this new antimicrobial therapy prior to new PCN or intervention/ stent     If any questions, please call or send a message on [employer] Teams  Please continue to update ID with any pertinent new laboratory or radiographic findings  Spectra [id number]

## 2021-06-22 NOTE — PROGRESS NOTE ADULT - ASSESSMENT
65y M with PMHx of cerebral palsy, Seizure disorder, spastic paraplegia s/p PEG, BPH, Hx of Nephrolithiasis s/p pcnl x 2 (last oen in 5/20/21) with known incompetent bladder neck s/p Suprapubic Tube Placement admitted on 6/17/21 with sepsis 2/2 obstructive nephrolithiasis. CT revealed R proximal ureteral stone with moderate hydroureteronephrosis. On 6/18, pt decompensated to uroseptic shock (T 103F, BP 75/45). Emergent R Percutaneous Nephrolithotomy performed by IR on 6/18/2021. BP improved following procedure and LR bolus.     # Septic shock 2/2 pyelonephritis from obstructive nephrolithiasis - resolving   # obstructive urosepsis - resolving  - s/p R PCN on 6/18  - Cultures 6/18 (+) for Pseudomonas and e.Fecalis  - Call from lab on   - off levo since 6/20   - lactate wnl (1.5 on 6/18)  - as per uro, stents placement not possible due to known incompetent bladder neck   - when medically optimized, IR consult to convert nephrostomy to a nephro-ureteral stent and then eventual PCNL   - as per ID, c/w Zyvox and Zosyn    #acute hypoxic respiratory failure possibly secondary to acute seizure   - CXR: stable b/l opacities and effusions   - on 50/40 HFNC (6/22)  - TTE: EF 62%, mild aortic stenosis  - c/w lasix 40mg IV daily   - seizure disorder treatment     # Hypervolemia likely 2/2     #Seizure disorder  #Hx of cerebral palsy/spastic paraplegia s/p PEG tube   - was a rapid response in IR for seizure on 6/18  - c/w phenobarbital 60mg IV push daily (takes 64.8mg orally at home)   - PEG feedings     #BPH: c/w tamsulosin   #Asthma:  c/w inhalers    DVT ppx: lovenox  GI ppx: none  Diet: PEG feeds  Code Status: FULL CODE  Dispo: from nursing home  65y M with PMHx of cerebral palsy, Seizure disorder, spastic paraplegia s/p PEG, BPH, Hx of Nephrolithiasis s/p pcnl x 2 (last oen in 5/20/21) with known incompetent bladder neck s/p Suprapubic Tube Placement admitted on 6/17/21 with sepsis 2/2 obstructive nephrolithiasis. CT revealed R proximal ureteral stone with moderate hydroureteronephrosis. On 6/18, pt decompensated to uroseptic shock (T 103F, BP 75/45). Emergent R Percutaneous Nephrolithotomy performed by IR on 6/18/2021. BP improved following procedure and LR bolus.     # Septic shock 2/2 pyelonephritis from obstructive nephrolithiasis - resolving   # obstructive urosepsis - resolving  - s/p R PCN on 6/18  - Cultures 6/18 (+) for Pseudomonas and e.Fecalis  - off levo since 6/20   - lactate wnl (1.5 on 6/18)  - when medically optimized, IR consult to convert nephrostomy to a nephro-ureteral stent and then eventual PCNL   - as per ID, c/w Zyvox and Zosyn    #acute hypoxic respiratory failure possibly secondary to acute seizure   - CXR: stable b/l opacities and effusions   - on 50/40 HFNC (6/22)  - TTE: EF 62%, mild aortic stenosis  - c/w lasix 40mg IV daily   - seizure disorder treatment     # Hypervolemia likely 2/2     #Seizure disorder  #Hx of cerebral palsy/spastic paraplegia s/p PEG tube   - was a rapid response in IR for seizure on 6/18  - c/w phenobarbital 60mg IV push daily (takes 64.8mg orally at home)   - PEG feedings     #BPH: c/w tamsulosin   #Asthma:  c/w inhalers    DVT ppx: lovenox  GI ppx: none  Diet: PEG feeds  Code Status: FULL CODE  Dispo: from nursing home  65y M with PMHx of cerebral palsy, Seizure disorder, spastic paraplegia s/p PEG, BPH, Hx of Nephrolithiasis s/p pcnl x 2 (last oen in 5/20/21) with known incompetent bladder neck s/p Suprapubic Tube Placement admitted on 6/17/21 with sepsis 2/2 obstructive nephrolithiasis. CT revealed R proximal ureteral stone with moderate hydroureteronephrosis. On 6/18, pt decompensated to uroseptic shock (T 103F, BP 75/45). Emergent R Percutaneous Nephrolithotomy performed by IR on 6/18/2021. BP improved following procedure and LR bolus.     # Septic shock 2/2 pyelonephritis from obstructive nephrolithiasis - resolving   # obstructive urosepsis - resolving  - s/p R PCN on 6/18  - Blood Cultures 6/18 (+) for carbap. resistant Pseudomonas and e.Fecalis; Blood Culx 6/21: NGTD  - Urine Culx 6/18 (+) pseudomonas, proteus, ESBL, s aureus, actinotignum   - hemodynamically stable: off levo since 6/20 ; lactate wnl (1.5 on 6/18)  - when medically optimized, IR consult to convert nephrostomy to a nephro-ureteral stent and then eventual PCNL   - as per ID, c/w Zyvox and Zosyn  - Prednisone dose lowered to 40g daily PO/PEG    #acute hypoxic respiratory failure possibly secondary to acute seizure   - CXR: stable b/l opacities and effusions   - TTE: EF 62%, mild aortic stenosis  - c/w lasix 40mg IV daily   - seizure disorder treatment as below  - on 50/40 HFNC (6/22)  - Attempt trial on nasal cannula    # Hypervolemia likely 2/2     #Seizure disorder  #Hx of cerebral palsy/spastic paraplegia s/p PEG tube   - was a rapid response in IR for seizure on 6/18  - c/w phenobarbital 60mg IV push daily (takes 64.8mg orally at home)   - PEG feedings     #BPH: c/w tamsulosin   #Asthma:  c/w inhalers    DVT ppx: lovenox  GI ppx: none  Diet: PEG feeds  Code Status: FULL CODE  Dispo: from nursing home; f/u Nasal cannula trial,  65y M with PMHx of cerebral palsy, Seizure disorder, spastic paraplegia s/p PEG, BPH, Hx of Nephrolithiasis s/p pcnl x 2 (last oen in 5/20/21) with known incompetent bladder neck s/p Suprapubic Tube Placement admitted on 6/17/21 with sepsis 2/2 obstructive nephrolithiasis. CT revealed R proximal ureteral stone with moderate hydroureteronephrosis. On 6/18, pt decompensated to uroseptic shock (T 103F, BP 75/45). Emergent R Percutaneous Nephrolithotomy performed by IR on 6/18/2021. BP improved following procedure and LR bolus.     # Septic shock 2/2 pyelonephritis from obstructive nephrolithiasis - resolving   # obstructive urosepsis - resolving  - s/p R PCN on 6/18  - Blood Cultures 6/18 (+) for carbap. resistant Pseudomonas and e.Fecalis; Blood Culx 6/21: NGTD  - Urine Culx 6/18 (+) pseudomonas, proteus, ESBL, s aureus, actinotignum   - hemodynamically stable: off levo since 6/20 ; lactate wnl (1.5 on 6/18)  - when medically optimized, IR consult to convert nephrostomy to a nephro-ureteral stent and then eventual PCNL   - as per ID,  start Meropenem and Polymyxin; D/c zosyn and zyvox; contact precautions for CRE; Trend Lytes and Cr  - Prednisone dose lowered to 40g daily PO/PEG    #acute hypoxic respiratory failure possibly secondary to acute seizure   - CXR: stable b/l opacities and effusions   - TTE: EF 62%, mild aortic stenosis  - c/w lasix 40mg IV daily   - seizure disorder treatment as below  - on 50/40 HFNC (6/22)  - Attempt trial on nasal cannula    # Hypervolemia likely 2/2     #Seizure disorder  #Hx of cerebral palsy/spastic paraplegia s/p PEG tube   - was a rapid response in IR for seizure on 6/18  - c/w phenobarbital 60mg IV push daily (takes 64.8mg orally at home)   - PEG feedings     #BPH: c/w tamsulosin   #Asthma:  c/w inhalers    DVT ppx: lovenox  GI ppx: none  Diet: PEG feeds  Code Status: FULL CODE  Dispo: from nursing home; f/u Nasal cannula trial,     Followup:   - Trend lytes and Cr with new antibiotics (polymyxin and meropenem)

## 2021-06-23 LAB
ALBUMIN SERPL ELPH-MCNC: 3.1 G/DL — LOW (ref 3.5–5.2)
ALP SERPL-CCNC: 71 U/L — SIGNIFICANT CHANGE UP (ref 30–115)
ALT FLD-CCNC: 15 U/L — SIGNIFICANT CHANGE UP (ref 0–41)
ANION GAP SERPL CALC-SCNC: 7 MMOL/L — SIGNIFICANT CHANGE UP (ref 7–14)
APPEARANCE UR: ABNORMAL
AST SERPL-CCNC: 12 U/L — SIGNIFICANT CHANGE UP (ref 0–41)
BACTERIA # UR AUTO: NEGATIVE — SIGNIFICANT CHANGE UP
BASOPHILS # BLD AUTO: 0.01 K/UL — SIGNIFICANT CHANGE UP (ref 0–0.2)
BASOPHILS NFR BLD AUTO: 0.1 % — SIGNIFICANT CHANGE UP (ref 0–1)
BILIRUB SERPL-MCNC: 0.2 MG/DL — SIGNIFICANT CHANGE UP (ref 0.2–1.2)
BILIRUB UR-MCNC: NEGATIVE — SIGNIFICANT CHANGE UP
BUN SERPL-MCNC: 25 MG/DL — HIGH (ref 10–20)
CALCIUM SERPL-MCNC: 8.1 MG/DL — LOW (ref 8.5–10.1)
CHLORIDE SERPL-SCNC: 105 MMOL/L — SIGNIFICANT CHANGE UP (ref 98–110)
CO2 SERPL-SCNC: 34 MMOL/L — HIGH (ref 17–32)
COLOR SPEC: ABNORMAL
CREAT SERPL-MCNC: 0.5 MG/DL — LOW (ref 0.7–1.5)
DIFF PNL FLD: ABNORMAL
EOSINOPHIL # BLD AUTO: 0.02 K/UL — SIGNIFICANT CHANGE UP (ref 0–0.7)
EOSINOPHIL NFR BLD AUTO: 0.2 % — SIGNIFICANT CHANGE UP (ref 0–8)
EPI CELLS # UR: 6 /HPF — HIGH (ref 0–5)
GLUCOSE SERPL-MCNC: 124 MG/DL — HIGH (ref 70–99)
GLUCOSE UR QL: NEGATIVE — SIGNIFICANT CHANGE UP
HCT VFR BLD CALC: 35.4 % — LOW (ref 42–52)
HGB BLD-MCNC: 11.6 G/DL — LOW (ref 14–18)
HYALINE CASTS # UR AUTO: 15 /LPF — HIGH (ref 0–7)
IMM GRANULOCYTES NFR BLD AUTO: 0.8 % — HIGH (ref 0.1–0.3)
KETONES UR-MCNC: ABNORMAL
LEUKOCYTE ESTERASE UR-ACNC: NEGATIVE — SIGNIFICANT CHANGE UP
LYMPHOCYTES # BLD AUTO: 0.72 K/UL — LOW (ref 1.2–3.4)
LYMPHOCYTES # BLD AUTO: 6.3 % — LOW (ref 20.5–51.1)
MAGNESIUM SERPL-MCNC: 2 MG/DL — SIGNIFICANT CHANGE UP (ref 1.8–2.4)
MCHC RBC-ENTMCNC: 30.2 PG — SIGNIFICANT CHANGE UP (ref 27–31)
MCHC RBC-ENTMCNC: 32.8 G/DL — SIGNIFICANT CHANGE UP (ref 32–37)
MCV RBC AUTO: 92.2 FL — SIGNIFICANT CHANGE UP (ref 80–94)
MONOCYTES # BLD AUTO: 0.87 K/UL — HIGH (ref 0.1–0.6)
MONOCYTES NFR BLD AUTO: 7.6 % — SIGNIFICANT CHANGE UP (ref 1.7–9.3)
NEUTROPHILS # BLD AUTO: 9.71 K/UL — HIGH (ref 1.4–6.5)
NEUTROPHILS NFR BLD AUTO: 85 % — HIGH (ref 42.2–75.2)
NITRITE UR-MCNC: NEGATIVE — SIGNIFICANT CHANGE UP
NRBC # BLD: 0 /100 WBCS — SIGNIFICANT CHANGE UP (ref 0–0)
PH UR: 6.5 — SIGNIFICANT CHANGE UP (ref 5–8)
PHENOBARB SERPL-MCNC: 8.4 UG/ML — LOW (ref 15–40)
PHOSPHATE SERPL-MCNC: 3.2 MG/DL — SIGNIFICANT CHANGE UP (ref 2.1–4.9)
PLATELET # BLD AUTO: 164 K/UL — SIGNIFICANT CHANGE UP (ref 130–400)
POTASSIUM SERPL-MCNC: 3.9 MMOL/L — SIGNIFICANT CHANGE UP (ref 3.5–5)
POTASSIUM SERPL-SCNC: 3.9 MMOL/L — SIGNIFICANT CHANGE UP (ref 3.5–5)
PROT SERPL-MCNC: 5.6 G/DL — LOW (ref 6–8)
PROT UR-MCNC: ABNORMAL
RBC # BLD: 3.84 M/UL — LOW (ref 4.7–6.1)
RBC # FLD: 13.1 % — SIGNIFICANT CHANGE UP (ref 11.5–14.5)
RBC CASTS # UR COMP ASSIST: >720 /HPF — HIGH (ref 0–4)
SODIUM SERPL-SCNC: 146 MMOL/L — SIGNIFICANT CHANGE UP (ref 135–146)
SP GR SPEC: 1.01 — SIGNIFICANT CHANGE UP (ref 1.01–1.03)
UROBILINOGEN FLD QL: SIGNIFICANT CHANGE UP
WBC # BLD: 11.42 K/UL — HIGH (ref 4.8–10.8)
WBC # FLD AUTO: 11.42 K/UL — HIGH (ref 4.8–10.8)
WBC UR QL: 10 /HPF — HIGH (ref 0–5)

## 2021-06-23 PROCEDURE — 74018 RADEX ABDOMEN 1 VIEW: CPT | Mod: 26

## 2021-06-23 PROCEDURE — 71045 X-RAY EXAM CHEST 1 VIEW: CPT | Mod: 26

## 2021-06-23 PROCEDURE — 99232 SBSQ HOSP IP/OBS MODERATE 35: CPT

## 2021-06-23 RX ORDER — NYSTATIN CREAM 100000 [USP'U]/G
1 CREAM TOPICAL
Refills: 0 | Status: DISCONTINUED | OUTPATIENT
Start: 2021-06-23 | End: 2021-06-29

## 2021-06-23 RX ORDER — ACETAMINOPHEN 500 MG
650 TABLET ORAL EVERY 6 HOURS
Refills: 0 | Status: DISCONTINUED | OUTPATIENT
Start: 2021-06-23 | End: 2021-06-29

## 2021-06-23 RX ORDER — PHENOBARBITAL 60 MG
64.8 TABLET ORAL DAILY
Refills: 0 | Status: DISCONTINUED | OUTPATIENT
Start: 2021-06-23 | End: 2021-06-25

## 2021-06-23 RX ADMIN — POLYMYXIN B SULFATE 500 UNIT(S): 500000 INJECTION, POWDER, LYOPHILIZED, FOR SOLUTION INTRAMUSCULAR; INTRATHECAL; INTRAVENOUS; OPHTHALMIC at 17:58

## 2021-06-23 RX ADMIN — MEROPENEM 200 MILLIGRAM(S): 1 INJECTION INTRAVENOUS at 05:02

## 2021-06-23 RX ADMIN — NYSTATIN CREAM 1 APPLICATION(S): 100000 CREAM TOPICAL at 17:58

## 2021-06-23 RX ADMIN — TAMSULOSIN HYDROCHLORIDE 0.4 MILLIGRAM(S): 0.4 CAPSULE ORAL at 22:53

## 2021-06-23 RX ADMIN — Medication 40 MILLIGRAM(S): at 05:02

## 2021-06-23 RX ADMIN — MEROPENEM 200 MILLIGRAM(S): 1 INJECTION INTRAVENOUS at 13:07

## 2021-06-23 RX ADMIN — Medication 64.8 MILLIGRAM(S): at 13:07

## 2021-06-23 RX ADMIN — CHLORHEXIDINE GLUCONATE 1 APPLICATION(S): 213 SOLUTION TOPICAL at 05:02

## 2021-06-23 RX ADMIN — Medication 40 MILLIGRAM(S): at 12:02

## 2021-06-23 RX ADMIN — MEROPENEM 200 MILLIGRAM(S): 1 INJECTION INTRAVENOUS at 22:53

## 2021-06-23 RX ADMIN — ENOXAPARIN SODIUM 40 MILLIGRAM(S): 100 INJECTION SUBCUTANEOUS at 12:02

## 2021-06-23 RX ADMIN — POLYMYXIN B SULFATE 500 UNIT(S): 500000 INJECTION, POWDER, LYOPHILIZED, FOR SOLUTION INTRAMUSCULAR; INTRATHECAL; INTRAVENOUS; OPHTHALMIC at 05:02

## 2021-06-23 NOTE — PROGRESS NOTE ADULT - ASSESSMENT
65y M with PMHx of cerebral palsy, Seizure disorder, spastic paraplegia s/p PEG, BPH, Hx of Nephrolithiasis s/p pcnl x 2 (last oen in 5/20/21) with known incompetent bladder neck s/p Suprapubic Tube Placement admitted on 6/17/21 with sepsis 2/2 obstructive nephrolithiasis. CT revealed R proximal ureteral stone with moderate hydroureteronephrosis. On 6/18, pt decompensated to uroseptic shock (T 103F, BP 75/45). Emergent R Percutaneous Nephrolithotomy performed by IR on 6/18/2021, Levophed started. BP improved following procedure and LR bolus. Levophed was discontinued on 6/20, and patient has been hemodynamically stable since. Pt was weaned off HiFlow to nasal canula (6/22).     # Septic shock 2/2 pyelonephritis from obstructive nephrolithiasis - resolving   # obstructive urosepsis - resolving  - s/p R PCN on 6/18  - Blood Cultures 6/18 (+) for carbap. resistant Pseudomonas and e.Fecalis; Blood Culx 6/21: NGTD  - Urine Culx 6/18 (+) pseudomonas, proteus, ESBL, s aureus, actinotignum   - as per ID reccs: c/w Meropenem and Polymyxin; f/u Trend Lytes and Cr on new antibiox  - f/u UA to correlate with bladder wall thickening seen on imaging  - when medically optimized, IR consult to convert nephrostomy to a nephro-ureteral stent and then eventual PCNL       #acute hypoxic respiratory failure possibly secondary to acute seizure   - CXR: stable b/l opacities and effusions   - TTE: EF 62%, mild aortic stenosis  - c/w lasix 40mg IV daily   - seizure disorder treatment as below  - on 50/40 HFNC (6/22)  - Attempt trial on nasal cannula          #Seizure disorder  #Hx of cerebral palsy/spastic paraplegia s/p PEG tube   - was a rapid response in IR for seizure on 6/18  - c/w phenobarbital 60mg IV push daily (takes 64.8mg orally at home)   - PEG feedings   - Phenobarb PO   - Pheno level     #BPH: c/w tamsulosin   #Asthma:  c/w inhalers    DVT ppx: lovenox  GI ppx: none  Diet: PEG feeds  Code Status: FULL CODE  Dispo: from nursing home; f/u Nasal cannula trial,     Followup:   - Trend lytes and Cr with new antibiotics (polymyxin and meropenem) 65y M with PMHx of cerebral palsy, Seizure disorder, spastic paraplegia s/p PEG, BPH, Hx of Nephrolithiasis s/p pcnl x 2 (last oen in 5/20/21) with known incompetent bladder neck s/p Suprapubic Tube Placement admitted on 6/17/21 with sepsis 2/2 obstructive nephrolithiasis. CT revealed R proximal ureteral stone with moderate hydroureteronephrosis. On 6/18, pt decompensated to uroseptic shock (T 103F, BP 75/45). Emergent R Percutaneous Nephrolithotomy performed by IR on 6/18/2021, Levophed started. BP improved following procedure and LR bolus. Levophed was discontinued on 6/20, and patient has been hemodynamically stable since. Pt was weaned off HiFlow to nasal canula (6/22).     # Septic shock 2/2 bacteremia from pyelonephritis + obstructive nephrolithiasis - resolving   # obstructive urosepsis - resolving  - s/p R PCN on 6/18  - Blood Cultures 6/18 (+) for carbap. resistant Pseudomonas and e.Fecalis; Blood Culx 6/21: NGTD  - Urine Culx 6/18 (+) pseudomonas, proteus, ESBL, s aureus, actinotignum   - as per ID reccs: c/w Meropenem and Polymyxin; f/u Trend Lytes and Cr on new antibiox  - f/u UA to correlate with bladder wall thickening seen on 6/17 CT   - when medically optimized, IR consult to convert nephrostomy to a nephro-ureteral stent and then eventual PCNL       #acute hypoxic respiratory failure possibly secondary to acute seizure   - CXR 6/23: LLL opacification  - TTE: EF 62%, mild aortic stenosis  - c/w lasix 40mg IV daily   - stable on 4L NC (6/23)    #Seizure disorder  #Hx of cerebral palsy/spastic paraplegia s/p PEG tube   - was a rapid response in IR for seizure on 6/18  - 6/23: IV Phenobarb changed to -->64.8mg PO phenobarbital via peg qd  - Pheno level 6/23: 8.4, subtherapeutic, however patient has been maintained on this dose at NH     #BPH: c/w tamsulosin   #Asthma:  c/w inhalers    DVT ppx: lovenox  GI ppx: none  Diet: PEG feeds  Code Status: FULL CODE  Dispo: from nursing home; f/u Nasal cannula trial,     Followup:   - Trend lytes and Cr with new antibiotics (polymyxin and meropenem)

## 2021-06-23 NOTE — PROGRESS NOTE ADULT - SUBJECTIVE AND OBJECTIVE BOX
INTERVENTIONAL RADIOLOGY PROGRESS NOTE:     Procedure Requested:     HPI:  64 years old Male with PMHx of Cerebral Palsy, Seizure disorder, spastic paraplegia s/p PEG, BPH, Hx of Nephrolithiasis s/p pcnl x 2 with known incompetent bladder neck s/p SPT presents to ED with Right sided flank pain. Patient seen and examined at bedside, History obtained from group home chart. Patient reports feeling flank pain in the afternoon, radiating to RLQ, 10/10 with associated nausea/vomiting. Patient had Right-sided PCNL 5/20. Denies any fever, chills, SOB, CP, dysuria, hematuria. SPT in place draining cloudy yellow urine.     In ED, vitals were WNL, Labs significant for elevated lactate 2.2, UA + for bacteruria, LKE +.  CT A/P shows Delayed right nephrogram. Moderate right hydroureteronephrosis secondary to an obstructing right proximal ureteral stone. Stone size is difficult to measure due to extensive artifact. This stone appears to measure at least 6 mm craniocaudally (max ). He also has a right upper pole stone. Patient received 8mg of Morphine, IVF, Zofran, toradol and cefepime in ED patient reports pain is the same 10/10. To be admitted under medicine.  (18 Jun 2021 01:02)      PAST MEDICAL & SURGICAL HISTORY:  BPH (benign prostatic hyperplasia)    Cerebral palsy    Seizure  last seizure &gt;10 years ago    Osteoporosis    Spastic quadriplegia    Urinary calculi    Urinary retention    Asthma    S/P percutaneous endoscopic gastrostomy (PEG) tube placement    H/O cystoscopy    Suprapubic catheter        MEDICATIONS  (STANDING):  chlorhexidine 4% Liquid 1 Application(s) Topical <User Schedule>  enoxaparin Injectable 40 milliGRAM(s) SubCutaneous daily  furosemide   Injectable 40 milliGRAM(s) IV Push daily  meropenem  IVPB 2000 milliGRAM(s) IV Intermittent every 8 hours  nystatin Powder 1 Application(s) Topical two times a day  PHENobarbital 64.8 milliGRAM(s) Oral daily  polymyxin B IVPB [id number] Unit(s) IV Intermittent every 12 hours  predniSONE   Tablet 40 milliGRAM(s) Oral daily  tamsulosin 0.4 milliGRAM(s) Oral at bedtime    MEDICATIONS  (PRN):  ALBUTerol    90 MICROgram(s) HFA Inhaler 2 Puff(s) Inhalation every 6 hours PRN Bronchospasm  morphine  - Injectable 4 milliGRAM(s) IV Push every 6 hours PRN Moderate Pain (4 - 6)      Allergies    No Known Allergies    Intolerances          FAMILY HISTORY:      Physical Exam:   Vital Signs Last 24 Hrs  T(C): 37.2 (23 Jun 2021 16:00), Max: 37.4 (23 Jun 2021 00:00)  T(F): 98.9 (23 Jun 2021 16:00), Max: 99.3 (23 Jun 2021 00:00)  HR: 85 (23 Jun 2021 16:00) (83 - 99)  BP: 97/56 (23 Jun 2021 16:00) (86/48 - 123/62)  BP(mean): 71 (23 Jun 2021 16:00) (65 - 86)  RR: 18 (23 Jun 2021 16:00) (18 - 20)  SpO2: 98% (23 Jun 2021 12:30) (96% - 98%)       Labs:                         11.6   11.42 )-----------( 164      ( 23 Jun 2021 10:10 )             35.4     06-23    146  |  105  |  25<H>  ----------------------------<  124<H>  3.9   |  34<H>  |  0.5<L>    Ca    8.1<L>      23 Jun 2021 10:10  Phos  3.2     06-23  Mg     2.0     06-23    TPro  5.6<L>  /  Alb  3.1<L>  /  TBili  0.2  /  DBili  x   /  AST  12  /  ALT  15  /  AlkPhos  71  06-23          Radiology & Additional Studies:     Radiology imaging reviewed.       ASSESSMENT/ PLAN:   64 years old Male with PMHx of Cerebral Palsy, Seizure disorder, spastic paraplegia s/p PEG, BPH, Hx of Nephrolithiasis Moderate right hydroureteronephrosis secondary to an obstructing right proximal ureteral stone s/p R PCN 6/18  Was informed that the PCN tube was disloged.  -Please order non-contrast CT to assess tube placement.      Thank you for the courtesy of this consult, please call [phone] with any further questions.

## 2021-06-23 NOTE — PROGRESS NOTE ADULT - SUBJECTIVE AND OBJECTIVE BOX
OVERNIGHT EVENTS: events noted, on NC    Vital Signs Last 24 Hrs  T(C): 37.4 (23 Jun 2021 00:00), Max: 37.4 (22 Jun 2021 16:00)  T(F): 99.3 (23 Jun 2021 00:00), Max: 99.3 (22 Jun 2021 16:00)  HR: 86 (23 Jun 2021 04:34) (71 - 99)  BP: 93/54 (23 Jun 2021 04:34) (88/50 - 123/62)  BP(mean): 67 (23 Jun 2021 04:34) (64 - 86)  RR: 18 (23 Jun 2021 04:34) (18 - 78)  SpO2: 96% (23 Jun 2021 04:34) (96% - 98%)    PHYSICAL EXAMINATION:    GENERAL: ill looking    HEENT: Head is normocephalic and atraumatic.   NECK: Supple.    LUNGS: dec bs both bases    HEART: MARCIAL 3/6    ABDOMEN: Soft, nontender, and nondistended.      EXTREMITIES: Without any cyanosis, clubbing, rash, lesions or edema.    NEUROLOGIC: non focal        LABS:                        12.9   9.76  )-----------( 164      ( 22 Jun 2021 06:22 )             37.5     06-22    143  |  101  |  23<H>  ----------------------------<  133<H>  3.2<L>   |  32  |  0.6<L>    Ca    8.7      22 Jun 2021 06:22  Phos  2.5     06-22  Mg     2.1     06-22    TPro  6.2  /  Alb  3.7  /  TBili  0.3  /  DBili  x   /  AST  21  /  ALT  18  /  AlkPhos  77  06-22                          06-22-21 @ 07:01  -  06-23-21 @ 07:00  --------------------------------------------------------  IN: 2550 mL / OUT: 1433 mL / NET: 1117 mL        MICROBIOLOGY:  Culture Results:   No growth to date. (06-21 @ 07:17)  Culture Results:   No growth to date. (06-20 @ 19:24)      MEDICATIONS  (STANDING):  chlorhexidine 4% Liquid 1 Application(s) Topical <User Schedule>  enoxaparin Injectable 40 milliGRAM(s) SubCutaneous daily  furosemide   Injectable 40 milliGRAM(s) IV Push daily  meropenem  IVPB 2000 milliGRAM(s) IV Intermittent every 8 hours  PHENobarbital Injectable 60 milliGRAM(s) IV Push daily  polymyxin B IVPB [id number] Unit(s) IV Intermittent every 12 hours  predniSONE   Tablet 40 milliGRAM(s) Oral daily  tamsulosin 0.4 milliGRAM(s) Oral at bedtime    MEDICATIONS  (PRN):  ALBUTerol    90 MICROgram(s) HFA Inhaler 2 Puff(s) Inhalation every 6 hours PRN Bronchospasm  morphine  - Injectable 4 milliGRAM(s) IV Push every 6 hours PRN Moderate Pain (4 - 6)      RADIOLOGY & ADDITIONAL STUDIES:

## 2021-06-23 NOTE — PROGRESS NOTE ADULT - SUBJECTIVE AND OBJECTIVE BOX
Hospital Day:  6d    Subjective: Patient is a 65y old  Male who presents with a chief complaint of Nephrolithiasis (23 Jun 2021 08:11)      Pt seen and evaluated at bedside.   Complaints:  Over the night Events:    Past Medical Hx:   BPH (benign prostatic hyperplasia)    Cerebral palsy    Seizure    Osteoporosis    Spastic quadriplegia    Urinary calculi    Urinary retention    Asthma      Past Sx:  S/P percutaneous endoscopic gastrostomy (PEG) tube placement    H/O cystoscopy    History of suprapubic catheter    Suprapubic catheter      Allergies:  No Known Allergies    Current Meds:   Standng Meds:  chlorhexidine 4% Liquid 1 Application(s) Topical <User Schedule>  enoxaparin Injectable 40 milliGRAM(s) SubCutaneous daily  furosemide   Injectable 40 milliGRAM(s) IV Push daily  meropenem  IVPB 2000 milliGRAM(s) IV Intermittent every 8 hours  nystatin Powder 1 Application(s) Topical two times a day  PHENobarbital 64.8 milliGRAM(s) Oral daily  polymyxin B IVPB 807316 Unit(s) IV Intermittent every 12 hours  predniSONE   Tablet 40 milliGRAM(s) Oral daily  tamsulosin 0.4 milliGRAM(s) Oral at bedtime    PRN Meds:  ALBUTerol    90 MICROgram(s) HFA Inhaler 2 Puff(s) Inhalation every 6 hours PRN Bronchospasm  morphine  - Injectable 4 milliGRAM(s) IV Push every 6 hours PRN Moderate Pain (4 - 6)      Vital Signs:   T(F): 97.4 (06-23-21 @ 08:27), Max: 99.3 (06-22-21 @ 16:00)  HR: 83 (06-23-21 @ 12:30) (79 - 99)  BP: 101/57 (06-23-21 @ 12:30) (86/48 - 123/62)  RR: 18 (06-23-21 @ 12:30) (18 - 20)  SpO2: 98% (06-23-21 @ 12:30) (96% - 98%)    Physical Exam:   GENERAL: NAD, Resting in bed  HEENT: NCAT  CHEST/LUNG: Clear to auscultation bilaterally; No wheezing or rubs.   HEART: Regular rate and rhythm; No murmurs, rubs, or gallops  ABDOMEN: Bowel sounds present; Soft, Nontender, Nondistended.   EXTREMITIES:  No clubbing, cyanosis, or edema  NERVOUS SYSTEM:  Alert & Oriented X3    FLUID BALANCE    06-21-21 @ 07:01  -  06-22-21 @ 07:00  --------------------------------------------------------  IN: 1800 mL / OUT: 1353 mL / NET: 447 mL    06-22-21 @ 07:01  -  06-23-21 @ 07:00  --------------------------------------------------------  IN: 2550 mL / OUT: 1433 mL / NET: 1117 mL        Labs:                         11.6   11.42 )-----------( 164      ( 23 Jun 2021 10:10 )             35.4     Neutophil% 85.0, Lymphocyte% 6.3, Monocyte% 7.6, Bands% 0.8 06-23-21 @ 10:10    23 Jun 2021 10:10    146    |  105    |  25     ----------------------------<  124    3.9     |  34     |  0.5      Ca    8.1        23 Jun 2021 10:10  Phos  3.2       23 Jun 2021 10:10  Mg     2.0       23 Jun 2021 10:10    TPro  5.6    /  Alb  3.1    /  TBili  0.2    /  DBili  x      /  AST  12     /  ALT  15     /  AlkPhos  71     23 Jun 2021 10:10                          Culture - Blood (collected 06-22-21 @ 06:22)  Source: .Blood None  Preliminary Report (06-23-21 @ 13:02):    No growth to date.    Culture - Blood (collected 06-21-21 @ 07:17)  Source: .Blood None  Preliminary Report (06-22-21 @ 19:02):    No growth to date.    Culture - Blood (collected 06-20-21 @ 19:24)  Source: .Blood Blood-Peripheral  Preliminary Report (06-22-21 @ 01:02):    No growth to date.    Culture - Blood (collected 06-18-21 @ 09:29)  Source: .Blood None  Gram Stain (06-19-21 @ 07:48):    Upon re-evaluation of gram stain:    Growth in aerobic and anaerobic bottles: Gram Negative Rods and Gram    Positive Cocci in Pairs and Chains  Final Report (06-22-21 @ 12:03):    Growth in aerobic and anaerobic bottles: Enterococcus faecalis    Growth in aerobic and anaerobic bottles: Pseudomonas aeruginosa    (Carbapenem Resistant)    Growth in anaerobic bottle: Proteus mirabilis ESBL    ***Blood Panel PCR results on this specimenare available    approximately 3 hours after the Gram stain result.***    Gram stain, PCR, and/or culture results may not always    correspond due to difference in methodologies.    ************************************************************    This PCR assaywas performed by multiplex PCR. This    Assay tests for 66 bacterial and resistance gene targets.    Please refer to the [facility] test directory    at [email or web address] for details.  Organism: Blood Culture PCR  Blood Culture PCR  Enterococcus faecalis  Pseudomonas aeruginosa (Carbapenem Resistant)  Proteus mirabilis ESBL (06-22-21 @ 12:07)  Organism: Proteus mirabilis ESBL (06-22-21 @ 12:07)      -  Amikacin: R >32      -  Ampicillin: R >16 These ampicillin results predict results for amoxicillin      -  Ampicillin/Sulbactam: R 8/4 Enterobacter, Citrobacter, and Serratia may develop resistance during prolonged therapy (3-4 days)      -  Aztreonam: R >16      -  Cefazolin: R >16 Enterobacter, Citrobacter, and Serratia may develop resistance during prolonged therapy (3-4 days)      -  Cefepime: R >16      -  Cefoxitin: R >16      -  Ceftazidime/Avibactam: R >16      -  Ceftolozane/tazobactam: S <=2      -  Ceftriaxone: R >32 Enterobacter, Citrobacter, and Serratia may develop resistance during prolonged therapy      -  Ciprofloxacin: R >2      -  Ertapenem: R >1      -  Gentamicin: I 8      -  Levofloxacin: R >4      -  Meropenem: I 2      -  Piperacillin/Tazobactam: R <=8      -  Tobramycin: S <=2      -  Trimethoprim/Sulfamethoxazole: R >2/38      Method Type: MIGUEL A  Organism: Pseudomonas aeruginosa (Carbapenem Resistant) (06-22-21 @ 12:07)      -  Amikacin: S <=16      -  Aztreonam: S 8      -  Cefepime: I 16      -  Ceftazidime: I 16      -  Ciprofloxacin: R >2      -  Gentamicin: S 4      -  Imipenem: R >8      -  Levofloxacin: R >4      -  Meropenem: R 8      -  Piperacillin/Tazobactam: I 64      -  Tobramycin: S <=2      Method Type: MIGUEL A  Organism: Enterococcus faecalis (06-22-21 @ 12:06)      -  Ampicillin: S <=2 Predicts results to ampicillin/sulbactam, amoxacillin-clavulanate and  piperacillin-tazobactam.      -  Gentamicin synergy: S      -  Vancomycin: S 2      Method Type: MIGUEL A  Organism: Blood Culture PCR (06-22-21 @ 12:04)      -  Pseudomonas aeruginosa: Detec      Method Type: PCR  Organism: Blood Culture PCR (06-22-21 @ 12:04)      -  Enterococcus faecalis,VRE: Detec      Method Type: PCR          Ferritin, Serum: 107 ng/mL (06-18-21 @ 09:29)    C-Reactive Protein, Serum: 120 mg/L (06-18-21 @ 09:29)        Radiology:   < from: Xray Chest 1 View- PORTABLE-Routine (Xray Chest 1 View- PORTABLE-Routine in AM.) (06.23.21 @ 06:23) >  Impression:    Left lower lobe opacification. Support devices as described. Follow-up as needed.       Hospital Day:  6d    Subjective: Patient is a 65y old  Male who presents with a chief complaint of Nephrolithiasis (23 Jun 2021 08:11)      Pt seen and evaluated at bedside.   Complaints:   Over the night Events:    Past Medical Hx:   BPH (benign prostatic hyperplasia)    Cerebral palsy    Seizure    Osteoporosis    Spastic quadriplegia    Urinary calculi    Urinary retention    Asthma      Past Sx:  S/P percutaneous endoscopic gastrostomy (PEG) tube placement    H/O cystoscopy    History of suprapubic catheter    Suprapubic catheter      Allergies:  No Known Allergies    Current Meds:   Standng Meds:  chlorhexidine 4% Liquid 1 Application(s) Topical <User Schedule>  enoxaparin Injectable 40 milliGRAM(s) SubCutaneous daily  furosemide   Injectable 40 milliGRAM(s) IV Push daily  meropenem  IVPB 2000 milliGRAM(s) IV Intermittent every 8 hours  nystatin Powder 1 Application(s) Topical two times a day  PHENobarbital 64.8 milliGRAM(s) Oral daily  polymyxin B IVPB 799987 Unit(s) IV Intermittent every 12 hours  predniSONE   Tablet 40 milliGRAM(s) Oral daily  tamsulosin 0.4 milliGRAM(s) Oral at bedtime    PRN Meds:  ALBUTerol    90 MICROgram(s) HFA Inhaler 2 Puff(s) Inhalation every 6 hours PRN Bronchospasm  morphine  - Injectable 4 milliGRAM(s) IV Push every 6 hours PRN Moderate Pain (4 - 6)      Vital Signs:   T(F): 97.4 (06-23-21 @ 08:27), Max: 99.3 (06-22-21 @ 16:00)  HR: 83 (06-23-21 @ 12:30) (79 - 99)  BP: 101/57 (06-23-21 @ 12:30) (86/48 - 123/62)  RR: 18 (06-23-21 @ 12:30) (18 - 20)  SpO2: 98% (06-23-21 @ 12:30) (96% - 98%)    Physical Exam:   GENERAL: NAD, Resting in bed  HEENT: Full ROM in bilateral eyes  CHEST/LUNG: (+) breath sounds in all lung fields; no wheezes noted  HEART: S1 S2 regular rate and rhythm; No murmurs  ABDOMEN: Bowel sounds present; Soft, Nontender to palpation, non-distended  EXTREMITIES:  contracted; non edematous  : Cole blood noted in both alatorre and nephrostomy tube; no flank tenderness to palpation    FLUID BALANCE    06-21-21 @ 07:01  -  06-22-21 @ 07:00  --------------------------------------------------------  IN: 1800 mL / OUT: 1353 mL / NET: 447 mL    06-22-21 @ 07:01  -  06-23-21 @ 07:00  --------------------------------------------------------  IN: 2550 mL / OUT: 1433 mL / NET: 1117 mL        Labs:                         11.6   11.42 )-----------( 164      ( 23 Jun 2021 10:10 )             35.4     Neutophil% 85.0, Lymphocyte% 6.3, Monocyte% 7.6, Bands% 0.8 06-23-21 @ 10:10    23 Jun 2021 10:10    146    |  105    |  25     ----------------------------<  124    3.9     |  34     |  0.5      Ca    8.1        23 Jun 2021 10:10  Phos  3.2       23 Jun 2021 10:10  Mg     2.0       23 Jun 2021 10:10    TPro  5.6    /  Alb  3.1    /  TBili  0.2    /  DBili  x      /  AST  12     /  ALT  15     /  AlkPhos  71     23 Jun 2021 10:10          Culture - Blood (collected 06-22-21 @ 06:22)  Source: .Blood None  Preliminary Report (06-23-21 @ 13:02):    No growth to date.    Culture - Blood (collected 06-21-21 @ 07:17)  Source: .Blood None  Preliminary Report (06-22-21 @ 19:02):    No growth to date.    Culture - Blood (collected 06-20-21 @ 19:24)  Source: .Blood Blood-Peripheral  Preliminary Report (06-22-21 @ 01:02):    No growth to date.    Culture - Blood (collected 06-18-21 @ 09:29)  Source: .Blood None  Gram Stain (06-19-21 @ 07:48):    Upon re-evaluation of gram stain:    Growth in aerobic and anaerobic bottles: Gram Negative Rods and Gram    Positive Cocci in Pairs and Chains  Final Report (06-22-21 @ 12:03):    Growth in aerobic and anaerobic bottles: Enterococcus faecalis    Growth in aerobic and anaerobic bottles: Pseudomonas aeruginosa    (Carbapenem Resistant)    Growth in anaerobic bottle: Proteus mirabilis ESBL    ***Blood Panel PCR results on this specimenare available    approximately 3 hours after the Gram stain result.***    Gram stain, PCR, and/or culture results may not always    correspond due to difference in methodologies.    ************************************************************    This PCR assaywas performed by multiplex PCR. This    Assay tests for 66 bacterial and resistance gene targets.    Please refer to the [facility] test directory    at [email or web address] for details.  Organism: Blood Culture PCR  Blood Culture PCR  Enterococcus faecalis  Pseudomonas aeruginosa (Carbapenem Resistant)  Proteus mirabilis ESBL (06-22-21 @ 12:07)  Organism: Proteus mirabilis ESBL (06-22-21 @ 12:07)      -  Amikacin: R >32      -  Ampicillin: R >16 These ampicillin results predict results for amoxicillin      -  Ampicillin/Sulbactam: R 8/4 Enterobacter, Citrobacter, and Serratia may develop resistance during prolonged therapy (3-4 days)      -  Aztreonam: R >16      -  Cefazolin: R >16 Enterobacter, Citrobacter, and Serratia may develop resistance during prolonged therapy (3-4 days)      -  Cefepime: R >16      -  Cefoxitin: R >16      -  Ceftazidime/Avibactam: R >16      -  Ceftolozane/tazobactam: S <=2      -  Ceftriaxone: R >32 Enterobacter, Citrobacter, and Serratia may develop resistance during prolonged therapy      -  Ciprofloxacin: R >2      -  Ertapenem: R >1      -  Gentamicin: I 8      -  Levofloxacin: R >4      -  Meropenem: I 2      -  Piperacillin/Tazobactam: R <=8      -  Tobramycin: S <=2      -  Trimethoprim/Sulfamethoxazole: R >2/38      Method Type: MIGUEL A  Organism: Pseudomonas aeruginosa (Carbapenem Resistant) (06-22-21 @ 12:07)      -  Amikacin: S <=16      -  Aztreonam: S 8      -  Cefepime: I 16      -  Ceftazidime: I 16      -  Ciprofloxacin: R >2      -  Gentamicin: S 4      -  Imipenem: R >8      -  Levofloxacin: R >4      -  Meropenem: R 8      -  Piperacillin/Tazobactam: I 64      -  Tobramycin: S <=2      Method Type: MIGUEL A  Organism: Enterococcus faecalis (06-22-21 @ 12:06)      -  Ampicillin: S <=2 Predicts results to ampicillin/sulbactam, amoxacillin-clavulanate and  piperacillin-tazobactam.      -  Gentamicin synergy: S      -  Vancomycin: S 2      Method Type: MIGUEL A  Organism: Blood Culture PCR (06-22-21 @ 12:04)      -  Pseudomonas aeruginosa: Detec      Method Type: PCR  Organism: Blood Culture PCR (06-22-21 @ 12:04)      -  Enterococcus faecalis,VRE: Detec      Method Type: PCR          Ferritin, Serum: 107 ng/mL (06-18-21 @ 09:29)    C-Reactive Protein, Serum: 120 mg/L (06-18-21 @ 09:29)        Radiology:   < from: Xray Chest 1 View- PORTABLE-Routine (Xray Chest 1 View- PORTABLE-Routine in AM.) (06.23.21 @ 06:23) >  Impression:    Left lower lobe opacification. Support devices as described. Follow-up as needed.       Hospital Day:  6d    Subjective: Patient is a 65y old  Male who presents with a chief complaint of Nephrolithiasis (23 Jun 2021 08:11)  Brought in from Nursing home on 6/17. Pt has PMHx of Cerebral Palsy, Seizure disorder, spastic paraplegia s/p PEG, BPH, Hx of Nephrolithiasis s/p pcnl x 2 with known incompetent bladder neck s/p SPT Presented to ED on 6/17 with R flank pain.         Pt seen and evaluated at bedside.   Complaints: Reports no complaints  Over the night Events: Patient had blood pressure in 90's systolic overnight. Clinically stable.     Past Medical Hx:   BPH (benign prostatic hyperplasia)    Cerebral palsy    Seizure    Osteoporosis    Spastic quadriplegia    Urinary calculi    Urinary retention    Asthma      Past Sx:  S/P percutaneous endoscopic gastrostomy (PEG) tube placement    H/O cystoscopy    History of suprapubic catheter    Suprapubic catheter      Allergies:  No Known Allergies    Current Meds:   Standng Meds:  chlorhexidine 4% Liquid 1 Application(s) Topical <User Schedule>  enoxaparin Injectable 40 milliGRAM(s) SubCutaneous daily  furosemide   Injectable 40 milliGRAM(s) IV Push daily  meropenem  IVPB 2000 milliGRAM(s) IV Intermittent every 8 hours  nystatin Powder 1 Application(s) Topical two times a day  PHENobarbital 64.8 milliGRAM(s) Oral daily  polymyxin B IVPB [id number] Unit(s) IV Intermittent every 12 hours  predniSONE   Tablet 40 milliGRAM(s) Oral daily  tamsulosin 0.4 milliGRAM(s) Oral at bedtime    PRN Meds:  ALBUTerol    90 MICROgram(s) HFA Inhaler 2 Puff(s) Inhalation every 6 hours PRN Bronchospasm  morphine  - Injectable 4 milliGRAM(s) IV Push every 6 hours PRN Moderate Pain (4 - 6)      Vital Signs:   T(F): 97.4 (06-23-21 @ 08:27), Max: 99.3 (06-22-21 @ 16:00)  HR: 83 (06-23-21 @ 12:30) (79 - 99)  BP: 101/57 (06-23-21 @ 12:30) (86/48 - 123/62)  RR: 18 (06-23-21 @ 12:30) (18 - 20)  SpO2: 98% (06-23-21 @ 12:30) (96% - 98%)    Physical Exam:   GENERAL: NAD, Resting in bed  HEENT: Full ROM in bilateral eyes  CHEST/LUNG: (+) breath sounds in all lung fields; no wheezes noted  HEART: S1 S2 regular rate and rhythm; No murmurs  ABDOMEN: Bowel sounds present; Soft, Nontender to palpation, non-distended  EXTREMITIES:  contracted; non edematous  : Cole blood noted in both alatorre and nephrostomy tube; no flank tenderness to palpation; chronic suprapubic alatorre site is clean.     FLUID BALANCE    06-21-21 @ 07:01  -  06-22-21 @ 07:00  --------------------------------------------------------  IN: 1800 mL / OUT: 1353 mL / NET: 447 mL    06-22-21 @ 07:01  -  06-23-21 @ 07:00  --------------------------------------------------------  IN: 2550 mL / OUT: 1433 mL / NET: 1117 mL        Labs:                         11.6   11.42 )-----------( 164      ( 23 Jun 2021 10:10 )             35.4     Neutophil% 85.0, Lymphocyte% 6.3, Monocyte% 7.6, Bands% 0.8 06-23-21 @ 10:10    23 Jun 2021 10:10    146    |  105    |  25     ----------------------------<  124    3.9     |  34     |  0.5      Ca    8.1        23 Jun 2021 10:10  Phos  3.2       23 Jun 2021 10:10  Mg     2.0       23 Jun 2021 10:10    TPro  5.6    /  Alb  3.1    /  TBili  0.2    /  DBili  x      /  AST  12     /  ALT  15     /  AlkPhos  71     23 Jun 2021 10:10          Culture - Blood (collected 06-22-21 @ 06:22)  Source: .Blood None  Preliminary Report (06-23-21 @ 13:02):    No growth to date.    Culture - Blood (collected 06-21-21 @ 07:17)  Source: .Blood None  Preliminary Report (06-22-21 @ 19:02):    No growth to date.    Culture - Blood (collected 06-20-21 @ 19:24)  Source: .Blood Blood-Peripheral  Preliminary Report (06-22-21 @ 01:02):    No growth to date.    Culture - Blood (collected 06-18-21 @ 09:29)  Source: .Blood None  Gram Stain (06-19-21 @ 07:48):    Upon re-evaluation of gram stain:    Growth in aerobic and anaerobic bottles: Gram Negative Rods and Gram    Positive Cocci in Pairs and Chains  Final Report (06-22-21 @ 12:03):    Growth in aerobic and anaerobic bottles: Enterococcus faecalis    Growth in aerobic and anaerobic bottles: Pseudomonas aeruginosa    (Carbapenem Resistant)    Growth in anaerobic bottle: Proteus mirabilis ESBL    ***Blood Panel PCR results on this specimenare available    approximately 3 hours after the Gram stain result.***    Gram stain, PCR, and/or culture results may not always    correspond due to difference in methodologies.    ************************************************************    This PCR assaywas performed by multiplex PCR. This    Assay tests for 66 bacterial and resistance gene targets.    Please refer to the [facility] test directory    at [email or web address] for details.  Organism: Blood Culture PCR  Blood Culture PCR  Enterococcus faecalis  Pseudomonas aeruginosa (Carbapenem Resistant)  Proteus mirabilis ESBL (06-22-21 @ 12:07)  Organism: Proteus mirabilis ESBL (06-22-21 @ 12:07)      -  Amikacin: R >32      -  Ampicillin: R >16 These ampicillin results predict results for amoxicillin      -  Ampicillin/Sulbactam: R 8/4 Enterobacter, Citrobacter, and Serratia may develop resistance during prolonged therapy (3-4 days)      -  Aztreonam: R >16      -  Cefazolin: R >16 Enterobacter, Citrobacter, and Serratia may develop resistance during prolonged therapy (3-4 days)      -  Cefepime: R >16      -  Cefoxitin: R >16      -  Ceftazidime/Avibactam: R >16      -  Ceftolozane/tazobactam: S <=2      -  Ceftriaxone: R >32 Enterobacter, Citrobacter, and Serratia may develop resistance during prolonged therapy      -  Ciprofloxacin: R >2      -  Ertapenem: R >1      -  Gentamicin: I 8      -  Levofloxacin: R >4      -  Meropenem: I 2      -  Piperacillin/Tazobactam: R <=8      -  Tobramycin: S <=2      -  Trimethoprim/Sulfamethoxazole: R >2/38      Method Type: MIGUEL A  Organism: Pseudomonas aeruginosa (Carbapenem Resistant) (06-22-21 @ 12:07)      -  Amikacin: S <=16      -  Aztreonam: S 8      -  Cefepime: I 16      -  Ceftazidime: I 16      -  Ciprofloxacin: R >2      -  Gentamicin: S 4      -  Imipenem: R >8      -  Levofloxacin: R >4      -  Meropenem: R 8      -  Piperacillin/Tazobactam: I 64      -  Tobramycin: S <=2      Method Type: MIGUEL A  Organism: Enterococcus faecalis (06-22-21 @ 12:06)      -  Ampicillin: S <=2 Predicts results to ampicillin/sulbactam, amoxacillin-clavulanate and  piperacillin-tazobactam.      -  Gentamicin synergy: S      -  Vancomycin: S 2      Method Type: MIGUEL A  Organism: Blood Culture PCR (06-22-21 @ 12:04)      -  Pseudomonas aeruginosa: Detec      Method Type: PCR  Organism: Blood Culture PCR (06-22-21 @ 12:04)      -  Enterococcus faecalis,VRE: Detec      Method Type: PCR          Ferritin, Serum: 107 ng/mL (06-18-21 @ 09:29)    C-Reactive Protein, Serum: 120 mg/L (06-18-21 @ 09:29)        Radiology:   < from: Xray Chest 1 View- PORTABLE-Routine (Xray Chest 1 View- PORTABLE-Routine in AM.) (06.23.21 @ 06:23) >  Impression:    Left lower lobe opacification. Support devices as described. Follow-up as needed.

## 2021-06-23 NOTE — PROGRESS NOTE ADULT - SUBJECTIVE AND OBJECTIVE BOX
UROLOGY DAILY PROGRESS NOTE    Pt is a 65y M with a proximal R ureteral stone s/p R PCN placement, awaiting R PCNL when medically optimal. Pt seen and examined at bedside, without acute complaints. RN at bedside reports R PCN with blood tinged urine; tubing may have been tugged while turning patient. Pt denies flank pain, fevers/chills.     MEDICATIONS  (STANDING):  chlorhexidine 4% Liquid 1 Application(s) Topical <User Schedule>  enoxaparin Injectable 40 milliGRAM(s) SubCutaneous daily  furosemide   Injectable 40 milliGRAM(s) IV Push daily  meropenem  IVPB 2000 milliGRAM(s) IV Intermittent every 8 hours  nystatin Powder 1 Application(s) Topical two times a day  PHENobarbital 64.8 milliGRAM(s) Oral daily  polymyxin B IVPB 720915 Unit(s) IV Intermittent every 12 hours  predniSONE   Tablet 40 milliGRAM(s) Oral daily  tamsulosin 0.4 milliGRAM(s) Oral at bedtime    MEDICATIONS  (PRN):  ALBUTerol    90 MICROgram(s) HFA Inhaler 2 Puff(s) Inhalation every 6 hours PRN Bronchospasm  morphine  - Injectable 4 milliGRAM(s) IV Push every 6 hours PRN Moderate Pain (4 - 6)    Review of Systems:  [X] A ten point review of systems was otherwise negative except as noted.    Vital Signs Last 24 Hrs  T(C): 37.2 (2021 16:00), Max: 37.4 (2021 00:00)  T(F): 98.9 (2021 16:00), Max: 99.3 (2021 00:00)  HR: 85 (2021 16:00) (83 - 99)  BP: 97/56 (2021 16:00) (86/48 - 123/62)  BP(mean): 71 (2021 16:00) (65 - 86)  RR: 18 (2021 16:00) (18 - 20)  SpO2: 98% (2021 12:30) (96% - 98%)    PHYSICAL EXAM:  GEN: NAD  NEURO: Awake and alert   HEENT: NC/AT  RESP: +NC in place  ABDO: Soft, nontender  BACK: +R PCN in place with blood tinged urine in collection bag; no ttp around site   : +Suprapubic tube in place with yellow urine in collection bag and blood tinged urine in tubing     I&O's Summary    2021 07:  -  2021 07:00  --------------------------------------------------------  IN: 2550 mL / OUT: 1433 mL / NET: 1117 mL    2021 07:01  -  2021 17:17  --------------------------------------------------------  IN: 400 mL / OUT: 1125 mL / NET: -725 mL      LABS:                        11.6   11.42 )-----------( 164      ( 2021 10:10 )             35.4         146  |  105  |  25<H>  ----------------------------<  124<H>  3.9   |  34<H>  |  0.5<L>    Ca    8.1<L>      2021 10:10  Phos  3.2       Mg     2.0         TPro  5.6<L>  /  Alb  3.1<L>  /  TBili  0.2  /  DBili  x   /  AST  12  /  ALT  15  /  AlkPhos  71      Urinalysis Basic - ( 2021 15:20 )  Color: Light Brown / Appearance: Slightly Turbid / S.014 / pH: x  Gluc: x / Ketone: Small  / Bili: Negative / Urobili: <2 mg/dL   Blood: x / Protein: 30 mg/dL / Nitrite: Negative   Leuk Esterase: Negative / RBC: >720 /HPF / WBC 10 /HPF   Sq Epi: x / Non Sq Epi: 6 /HPF / Bacteria: Negative

## 2021-06-23 NOTE — PROGRESS NOTE ADULT - ASSESSMENT
Pt is a 65y M with a proximal R ureteral stone s/p R PCN placement, awaiting R PCNL when medically optimal.    ·	KUB to assess R PCN position  ·	Cont to monitor I&O's   ·	Cont abx as per ID  ·	ID rec's appreciated; Repeat BCx are NG, would given at least 72h on this new antimicrobial therapy prior to intervention   ·	f/u repeat Cx taken from PCN 6/22  ·	When medically optimal IR consult to convert nephrostomy to a nephro-ureteral stent via mid pole calyx- PCNL scheduled tentatively for next Tuesday if cleared   ·	F/u ID, Cardiology and Medical clearances prior to procedure  ·	Obtain COVID test 3 prior to procedure  ·	Will d/w attng     ` Pt is a 65y M with a proximal R ureteral stone s/p R PCN placement, awaiting R PCNL when medically optimal.    ·	Cont to monitor I&O's   ·	Cont abx as per ID  ·	ID rec's appreciated; Repeat BCx are NG, would given at least 72h on this new antimicrobial therapy prior to intervention   ·	f/u repeat Cx taken from PCN 6/22  ·	When medically optimal IR consult to convert nephrostomy to a nephro-ureteral stent via mid pole calyx- PCNL scheduled tentatively for next Tuesday if cleared   ·	F/u ID, Cardiology and Medical clearances prior to procedure  ·	Obtain COVID test 3 prior to procedure  ·	Will d/w attng     ` Pt is a 65y M with a proximal R ureteral stone s/p R PCN placement, awaiting R PCNL when medically optimal.      ·	IR recs appreciated; f/u CT noncon   ·	Cont to monitor I&O's   ·	Cont abx as per ID  ·	ID rec's appreciated; Repeat BCx are NG, would given at least 72h on this new antimicrobial therapy prior to intervention   ·	f/u repeat Cx taken from PCN 6/22  ·	When medically optimal IR consult to convert nephrostomy to a nephro-ureteral stent via mid pole calyx- PCNL scheduled tentatively for next Tuesday if cleared   ·	F/u ID, Cardiology and Medical clearances prior to procedure  ·	Obtain COVID test 3 prior to procedure  ·	Will d/w attng     `

## 2021-06-23 NOTE — PROGRESS NOTE ADULT - ASSESSMENT
IMPRESSION:    Sepsis present on admission better  Septic shock, improving  Obstructive pyelonephritis s/p Right PCN  Pseudomonas & E. faecalis VRE bacteremia  Chronic suprapubic alatorre  Seizure yesterday? HO Seizures  HO Cerebral palsy chronically contracted      PLAN:    CNS:  Avoid CNS depressants.     HEENT:  Oral care.  Aspiration precautions.    PULMONARY:  HOB @ 45 degrees. CXR today.  Wean O2 as tolerated, Goal 88 to 94%, NC    CARDIOVASCULAR:  Lasix IV 40mg daily    GI: GI prophylaxis. PEG feeds.     RENAL: FU lytes.  Correct as needed.  Monitor UO.  Monitor nephrostomy drain.    INFECTIOUS DISEASE:  ABX PER ID    HEMATOLOGICAL:  DVT prophylaxis.    ENDOCRINE:  Follow up FS.  Insulin protocol if needed.    MUSCULOSKELETAL: bed rest    CODE STATUS: FULL CODE    DISPOSITION: SDU

## 2021-06-23 NOTE — PROGRESS NOTE ADULT - ASSESSMENT
ASSESSMENT  64 years old Male with PMHx of Cerebral Palsy, Seizure disorder, spastic paraplegia s/p PEG, BPH, Hx of Nephrolithiasis s/p pcnl x 2 with known incompetent bladder neck s/p SPT presents to ED with Right sided flank pain. Patient had Right-sided PCNL 5/20.    IMPRESSION  #Septic shock requiring pressors secondary to Polymicrobial bacteremia with Pseudomonas and VRE secondary to pyelonephritis secondary to obstructing stone    s/p SPT exchange and R PCN    R kidney CX   Numerous Pseudomonas aeruginosa (Carbapenem Resistant) Multiple Morphological Strains    Numerous Proteus mirabilis ESBL    Numerous Staphylococcus aureus- MSSA    Numerous Actinotignum schaali group "Susceptibilities not performed"    6/18 BCX Pseudomonas, Enterococcus faecalis VRE    UA  WBC 10     CTAP 1.  Obstructing proximal right ureteral stone with moderate hydroureteronephrosis.  2.  Bladder wall thickening. Correlate with urinalysis.  #CT Bibasilar atelectasis.  Creatinine, Serum: 0.8 (06-19-21 @ 07:11)    Weight (kg): 57.4 (06-18-21 @ 05:00)    RECOMMENDATIONS  - Contact isolation CRE  - Meropenem 2g q8h IV  - Polymyxin 700,000 units q12h IV. monitor Cr & lytes 6/22-  - Appreciate Urology consult- s/p SPT exchange 6/18  - Appreciate IR consult: s/p Percutaneous right nephrostomy  - Repeat BCX are NG, would given at least 72h on this new antimicrobial therapy prior to new PCN or intervention/ stent     If any questions, please call or send a message on [employer] Teams  Please continue to update ID with any pertinent new laboratory or radiographic findings  Spectra [id number]     ASSESSMENT  64 years old Male with PMHx of Cerebral Palsy, Seizure disorder, spastic paraplegia s/p PEG, BPH, Hx of Nephrolithiasis s/p pcnl x 2 with known incompetent bladder neck s/p SPT presents to ED with Right sided flank pain. Patient had Right-sided PCNL 5/20.    IMPRESSION  #Septic shock requiring pressors secondary to Polymicrobial bacteremia with Pseudomonas and VRE secondary to pyelonephritis secondary to obstructing stone    s/p SPT exchange and R PCN    R kidney CX   Numerous Pseudomonas aeruginosa (Carbapenem Resistant) Multiple Morphological Strains    Numerous Proteus mirabilis ESBL    Numerous Staphylococcus aureus- MSSA    Numerous Actinotignum schaali group "Susceptibilities not performed"    6/18 BCX Pseudomonas, Enterococcus faecalis VRE    UA  WBC 10     CTAP 1.  Obstructing proximal right ureteral stone with moderate hydroureteronephrosis.  2.  Bladder wall thickening. Correlate with urinalysis.  #CT Bibasilar atelectasis.  Creatinine, Serum: 0.8 (06-19-21 @ 07:11)    Weight (kg): 57.4 (06-18-21 @ 05:00)    RECOMMENDATIONS  - Meropenem 2g q8h IV  - Polymyxin 700,000 units q12h IV. monitor Cr & lytes 6/22-  - Appreciate Urology consult- s/p SPT exchange 6/18  - Appreciate IR consult: s/p Percutaneous right nephrostomy  - Repeat BCX are NG, would given at least 72h on this new antimicrobial therapy prior to new PCN or intervention/ stent     If any questions, please call or send a message on [employer] Teams  Please continue to update ID with any pertinent new laboratory or radiographic findings  Spectra [phone]

## 2021-06-23 NOTE — PROGRESS NOTE ADULT - SUBJECTIVE AND OBJECTIVE BOX
TISH SHAIKH  65y, Male  Allergy: No Known Allergies      LOS  6d    CHIEF COMPLAINT: Nephrolithiasis (23 Jun 2021 08:06)      INTERVAL EVENTS/HPI  - No acute events overnight  - T(F): , Max: 99.3 (06-22-21 @ 16:00)  - Tolerating medication  - WBC Count: 9.76 (06-22-21 @ 06:22)  WBC Count: 10.56 (06-21-21 @ 07:17)     - Creatinine, Serum: 0.6 (06-22-21 @ 06:22)       ROS  ***    VITALS:  T(F): 99.3, Max: 99.3 (06-22-21 @ 16:00)  HR: 86  BP: 93/54  RR: 18Vital Signs Last 24 Hrs  T(C): 37.4 (23 Jun 2021 00:00), Max: 37.4 (22 Jun 2021 16:00)  T(F): 99.3 (23 Jun 2021 00:00), Max: 99.3 (22 Jun 2021 16:00)  HR: 86 (23 Jun 2021 04:34) (71 - 99)  BP: 93/54 (23 Jun 2021 04:34) (88/50 - 123/62)  BP(mean): 67 (23 Jun 2021 04:34) (64 - 86)  RR: 18 (23 Jun 2021 04:34) (18 - 78)  SpO2: 96% (23 Jun 2021 04:34) (96% - 98%)    PHYSICAL EXAM:  ***    FH: Non-contributory  Social Hx: Non-contributory    TESTS & MEASUREMENTS:                        12.9   9.76  )-----------( 164      ( 22 Jun 2021 06:22 )             37.5     06-22    143  |  101  |  23<H>  ----------------------------<  133<H>  3.2<L>   |  32  |  0.6<L>    Ca    8.7      22 Jun 2021 06:22  Phos  2.5     06-22  Mg     2.1     06-22    TPro  6.2  /  Alb  3.7  /  TBili  0.3  /  DBili  x   /  AST  21  /  ALT  18  /  AlkPhos  77  06-22      LIVER FUNCTIONS - ( 22 Jun 2021 06:22 )  Alb: 3.7 g/dL / Pro: 6.2 g/dL / ALK PHOS: 77 U/L / ALT: 18 U/L / AST: 21 U/L / GGT: x               Culture - Blood (collected 06-21-21 @ 07:17)  Source: .Blood None  Preliminary Report (06-22-21 @ 19:02):    No growth to date.    Culture - Blood (collected 06-20-21 @ 19:24)  Source: .Blood Blood-Peripheral  Preliminary Report (06-22-21 @ 01:02):    No growth to date.    Culture - Urine (collected 06-18-21 @ 14:23)  Source: Kidney Right Kidney  Final Report (06-22-21 @ 08:46):    Numerous Pseudomonas aeruginosa (Carbapenem Resistant) Multiple    Morphological Strains    Numerous Proteus mirabilis ESBL    Numerous Staphylococcus aureus    Numerous Actinotignum schaali group "Susceptibilities not performed"  Organism: Pseudomonas aeruginosa (Carbapenem Resistant)  Proteus mirabilis ESBL  Enterococcus faecalis  Staphylococcus aureus (06-22-21 @ 08:46)  Organism: Staphylococcus aureus (06-22-21 @ 08:46)      -  Ampicillin/Sulbactam: S <=8/4      -  Cefazolin: S <=4      -  Gentamicin: S <=1 Should not be used as monotherapy      -  Oxacillin: S 1      -  Penicillin: R >8      -  RIF- Rifampin: S <=1 Should not be used as monotherapy      -  Tetra/Doxy: S <=1      -  Trimethoprim/Sulfamethoxazole: S <=0.5/9.5      -  Vancomycin: S 2      Method Type: MIGUEL A  Organism: Enterococcus faecalis (06-22-21 @ 08:46)      -  Ampicillin: S <=2 Predicts results to ampicillin/sulbactam, amoxacillin-clavulanate and  piperacillin-tazobactam.      -  Ciprofloxacin: S <=1      -  Levofloxacin: S <=1      -  Tetra/Doxy: R >8      -  Vancomycin: S 2      Method Type: MIGUEL A  Organism: Proteus mirabilis ESBL (06-22-21 @ 08:46)      -  Amikacin: S <=16      -  Amoxicillin/Clavulanic Acid: S <=8/4      -  Ampicillin: R >16 These ampicillin results predict results for amoxicillin      -  Ampicillin/Sulbactam: R 8/4 Enterobacter, Citrobacter, and Serratia may develop resistance during prolonged therapy (3-4 days)      -  Aztreonam: R >16      -  Cefazolin: R >16 ([id number]) For uncomplicated UTI with K. pneumoniae, E. coli, or P. mirablis: MIGUEL A <=16 is sensitive and MIGUEL A >=32 is resistant. This also predicts results for oral agents cefaclor, cefdinir, cefpodoxime, cefprozil, cefuroxime axetil, cephalexin and locarbef for uncomplicated UTI. Note that some isolates may be susceptible to these agents while testing resistant to cefazolin.      -  Cefepime: R >16      -  Cefoxitin: S <=8      -  Ceftriaxone: R >32 Enterobacter, Citrobacter, and Serratia may develop resistance during prolonged therapy      -  Ciprofloxacin: R >2      -  Ertapenem: S <=0.5      -  Gentamicin: S <=2      -  Levofloxacin: R >4      -  Meropenem: S <=1      -  Piperacillin/Tazobactam: R <=8      -  Tobramycin: S <=2      -  Trimethoprim/Sulfamethoxazole: R >2/38      Method Type: MIGUEL A  Organism: Pseudomonas aeruginosa (Carbapenem Resistant) (06-22-21 @ 08:46)      -  Amikacin: S <=16      -  Aztreonam: S <=4      -  Cefepime: S 8      -  Ceftazidime: S 8      -  Ciprofloxacin: R >2      -  Gentamicin: S 4      -  Imipenem: R >8      -  Levofloxacin: R >4      -  Meropenem: I 4      -  Piperacillin/Tazobactam: I 32      -  Tobramycin: S <=2      Method Type: MIGUEL A    Culture - Blood (collected 06-18-21 @ 09:29)  Source: .Blood None  Gram Stain (06-19-21 @ 07:48):    Upon re-evaluation of gram stain:    Growth in aerobic and anaerobic bottles: Gram Negative Rods and Gram    Positive Cocci in Pairs and Chains  Final Report (06-22-21 @ 12:03):    Growth in aerobic and anaerobic bottles: Enterococcus faecalis    Growth in aerobic and anaerobic bottles: Pseudomonas aeruginosa    (Carbapenem Resistant)    Growth in anaerobic bottle: Proteus mirabilis ESBL    ***Blood Panel PCR results on this specimenare available    approximately 3 hours after the Gram stain result.***    Gram stain, PCR, and/or culture results may not always    correspond due to difference in methodologies.    ************************************************************    This PCR assaywas performed by multiplex PCR. This    Assay tests for 66 bacterial and resistance gene targets.    Please refer to the [facility] test directory    at [email or web address] for details.  Organism: Blood Culture PCR  Blood Culture PCR  Enterococcus faecalis  Pseudomonas aeruginosa (Carbapenem Resistant)  Proteus mirabilis ESBL (06-22-21 @ 12:07)  Organism: Proteus mirabilis ESBL (06-22-21 @ 12:07)      -  Amikacin: R >32      -  Ampicillin: R >16 These ampicillin results predict results for amoxicillin      -  Ampicillin/Sulbactam: R 8/4 Enterobacter, Citrobacter, and Serratia may develop resistance during prolonged therapy (3-4 days)      -  Aztreonam: R >16      -  Cefazolin: R >16 Enterobacter, Citrobacter, and Serratia may develop resistance during prolonged therapy (3-4 days)      -  Cefepime: R >16      -  Cefoxitin: R >16      -  Ceftazidime/Avibactam: R >16      -  Ceftolozane/tazobactam: S <=2      -  Ceftriaxone: R >32 Enterobacter, Citrobacter, and Serratia may develop resistance during prolonged therapy      -  Ciprofloxacin: R >2      -  Ertapenem: R >1      -  Gentamicin: I 8      -  Levofloxacin: R >4      -  Meropenem: I 2      -  Piperacillin/Tazobactam: R <=8      -  Tobramycin: S <=2      -  Trimethoprim/Sulfamethoxazole: R >2/38      Method Type: MIGUEL A  Organism: Pseudomonas aeruginosa (Carbapenem Resistant) (06-22-21 @ 12:07)      -  Amikacin: S <=16      -  Aztreonam: S 8      -  Cefepime: I 16      -  Ceftazidime: I 16      -  Ciprofloxacin: R >2      -  Gentamicin: S 4      -  Imipenem: R >8      -  Levofloxacin: R >4      -  Meropenem: R 8      -  Piperacillin/Tazobactam: I 64      -  Tobramycin: S <=2      Method Type: MIGUEL A  Organism: Enterococcus faecalis (06-22-21 @ 12:06)      -  Ampicillin: S <=2 Predicts results to ampicillin/sulbactam, amoxacillin-clavulanate and  piperacillin-tazobactam.      -  Gentamicin synergy: S      -  Vancomycin: S 2      Method Type: MIGUEL A  Organism: Blood Culture PCR (06-22-21 @ 12:04)      -  Pseudomonas aeruginosa: Detec      Method Type: PCR  Organism: Blood Culture PCR (06-22-21 @ 12:04)      -  Enterococcus faecalis,VRE: Detec      Method Type: PCR    Culture - Urine (collected 06-17-21 @ 16:27)  Source: .Urine Clean Catch (Midstream)  Final Report (06-21-21 @ 11:36):    >=3 organisms. Probable collection contamination.        Lactate, Blood: 1.7 mmol/L (06-20-21 @ 09:10)  Lactate, Blood: 2.0 mmol/L (06-19-21 @ 07:11)  Lactate, Blood: 2.3 mmol/L (06-18-21 @ 22:02)  Lactate, Blood: 1.8 mmol/L (06-18-21 @ 09:29)      INFECTIOUS DISEASES TESTING  COVID-19 PCR: NotDetec (06-17-21 @ 22:22)  Rapid RVP Result: NotDetec (04-01-21 @ 11:14)  COVID-19 PCR: NotDetec (09-14-20 @ 15:45)  COVID-19 PCR: NotDetec (09-08-20 @ 14:19)      INFLAMMATORY MARKERS  C-Reactive Protein, Serum: 120 mg/L (06-18-21 @ 09:29)      RADIOLOGY & ADDITIONAL TESTS:  I have personally reviewed the last available Chest xray  CXR      CT      CARDIOLOGY TESTING  12 Lead ECG:   Ventricular Rate 85 BPM    Atrial Rate 85 BPM    P-R Interval 122 ms    QRS Duration 80 ms    Q-T Interval 380 ms    QTC Calculation(Bazett) 452 ms    P Axis 46 degrees    R Axis 22 degrees    T Axis -10 degrees    Diagnosis Line Normal sinus rhythm  Nonspecific T wave abnormality minor  Otherwise normal ECG    Confirmed by MARIE CAPPS, YESICA ([id number]) on 6/19/2021 10:46:39 AM (06-19-21 @ 07:53)  12 Lead ECG:   Ventricular Rate 112 BPM    Atrial Rate 112 BPM    P-R Interval 120 ms    QRS Duration 84 ms    Q-T Interval 348 ms    QTC Calculation(Bazett) 475 ms    P Axis 45 degrees    R Axis 36 degrees    T Axis 4 degrees    Diagnosis Line Sinus tachycardia  Otherwise normal ECG    Confirmed by EBENEZER EDDY MD [id number]) on 6/18/2021 4:15:45 PM (06-18-21 @ 14:35)      MEDICATIONS  chlorhexidine 4% Liquid 1 Topical <User Schedule>  enoxaparin Injectable 40 SubCutaneous daily  furosemide   Injectable 40 IV Push daily  meropenem  IVPB 2000 IV Intermittent every 8 hours  PHENobarbital Injectable 60 IV Push daily  polymyxin B IVPB [id number] IV Intermittent every 12 hours  predniSONE   Tablet 40 Oral daily  tamsulosin 0.4 Oral at bedtime      WEIGHT  Weight (kg): 57.4 (06-18-21 @ 05:00)      ANTIBIOTICS:  meropenem  IVPB 2000 milliGRAM(s) IV Intermittent every 8 hours  polymyxin B IVPB [id number] Unit(s) IV Intermittent every 12 hours      All available historical records have been reviewed       TISH SHAIKH  65y, Male  Allergy: No Known Allergies      LOS  6d    CHIEF COMPLAINT: Nephrolithiasis (23 Jun 2021 08:06)      INTERVAL EVENTS/HPI  - No acute events overnight  - T(F): , Max: 99.3 (06-22-21 @ 16:00)  - Tolerating medication  - WBC Count: 9.76 (06-22-21 @ 06:22)  WBC Count: 10.56 (06-21-21 @ 07:17)     - Creatinine, Serum: 0.6 (06-22-21 @ 06:22)       ROS  General: Denies rigors, nightsweats  HEENT: Denies headache, rhinorrhea, sore throat, eye pain  CV: Denies CP, palpitations  PULM: Denies wheezing, hemoptysis  GI: Denies hematemesis, hematochezia, melena  : Denies discharge, hematuria  MSK: Denies arthralgias, myalgias  SKIN: Denies rash, lesions  NEURO: Denies paresthesias, weakness  PSYCH: Denies depression, anxiety     VITALS:  T(F): 99.3, Max: 99.3 (06-22-21 @ 16:00)  HR: 86  BP: 93/54  RR: 18Vital Signs Last 24 Hrs  T(C): 37.4 (23 Jun 2021 00:00), Max: 37.4 (22 Jun 2021 16:00)  T(F): 99.3 (23 Jun 2021 00:00), Max: 99.3 (22 Jun 2021 16:00)  HR: 86 (23 Jun 2021 04:34) (71 - 99)  BP: 93/54 (23 Jun 2021 04:34) (88/50 - 123/62)  BP(mean): 67 (23 Jun 2021 04:34) (64 - 86)  RR: 18 (23 Jun 2021 04:34) (18 - 78)  SpO2: 96% (23 Jun 2021 04:34) (96% - 98%)    PHYSICAL EXAM:  Gen: NAD, resting in bed  HEENT: Normocephalic, atraumatic  Neck: supple, no lymphadenopathy  CV: Regular rate & regular rhythm  Lungs: decreased BS at bases, no fremitus  Abdomen: Soft, BS present SPC PCN  Ext: Warm, well perfused  Neuro: non focal, awake  Skin: no rash, no erythema  Lines: no phlebitis     FH: Non-contributory  Social Hx: Non-contributory    TESTS & MEASUREMENTS:                        12.9   9.76  )-----------( 164      ( 22 Jun 2021 06:22 )             37.5     06-22    143  |  101  |  23<H>  ----------------------------<  133<H>  3.2<L>   |  32  |  0.6<L>    Ca    8.7      22 Jun 2021 06:22  Phos  2.5     06-22  Mg     2.1     06-22    TPro  6.2  /  Alb  3.7  /  TBili  0.3  /  DBili  x   /  AST  21  /  ALT  18  /  AlkPhos  77  06-22      LIVER FUNCTIONS - ( 22 Jun 2021 06:22 )  Alb: 3.7 g/dL / Pro: 6.2 g/dL / ALK PHOS: 77 U/L / ALT: 18 U/L / AST: 21 U/L / GGT: x               Culture - Blood (collected 06-21-21 @ 07:17)  Source: .Blood None  Preliminary Report (06-22-21 @ 19:02):    No growth to date.    Culture - Blood (collected 06-20-21 @ 19:24)  Source: .Blood Blood-Peripheral  Preliminary Report (06-22-21 @ 01:02):    No growth to date.    Culture - Urine (collected 06-18-21 @ 14:23)  Source: Kidney Right Kidney  Final Report (06-22-21 @ 08:46):    Numerous Pseudomonas aeruginosa (Carbapenem Resistant) Multiple    Morphological Strains    Numerous Proteus mirabilis ESBL    Numerous Staphylococcus aureus    Numerous Actinotignum schaali group "Susceptibilities not performed"  Organism: Pseudomonas aeruginosa (Carbapenem Resistant)  Proteus mirabilis ESBL  Enterococcus faecalis  Staphylococcus aureus (06-22-21 @ 08:46)  Organism: Staphylococcus aureus (06-22-21 @ 08:46)      -  Ampicillin/Sulbactam: S <=8/4      -  Cefazolin: S <=4      -  Gentamicin: S <=1 Should not be used as monotherapy      -  Oxacillin: S 1      -  Penicillin: R >8      -  RIF- Rifampin: S <=1 Should not be used as monotherapy      -  Tetra/Doxy: S <=1      -  Trimethoprim/Sulfamethoxazole: S <=0.5/9.5      -  Vancomycin: S 2      Method Type: MIGUEL A  Organism: Enterococcus faecalis (06-22-21 @ 08:46)      -  Ampicillin: S <=2 Predicts results to ampicillin/sulbactam, amoxacillin-clavulanate and  piperacillin-tazobactam.      -  Ciprofloxacin: S <=1      -  Levofloxacin: S <=1      -  Tetra/Doxy: R >8      -  Vancomycin: S 2      Method Type: MIGUEL A  Organism: Proteus mirabilis ESBL (06-22-21 @ 08:46)      -  Amikacin: S <=16      -  Amoxicillin/Clavulanic Acid: S <=8/4      -  Ampicillin: R >16 These ampicillin results predict results for amoxicillin      -  Ampicillin/Sulbactam: R 8/4 Enterobacter, Citrobacter, and Serratia may develop resistance during prolonged therapy (3-4 days)      -  Aztreonam: R >16      -  Cefazolin: R >16 (MIC_CL_COM_[email or web address]) For uncomplicated UTI with K. pneumoniae, E. coli, or P. mirablis: MIGUEL A <=16 is sensitive and MIGUEL A >=32 is resistant. This also predicts results for oral agents cefaclor, cefdinir, cefpodoxime, cefprozil, cefuroxime axetil, cephalexin and locarbef for uncomplicated UTI. Note that some isolates may be susceptible to these agents while testing resistant to cefazolin.      -  Cefepime: R >16      -  Cefoxitin: S <=8      -  Ceftriaxone: R >32 Enterobacter, Citrobacter, and Serratia may develop resistance during prolonged therapy      -  Ciprofloxacin: R >2      -  Ertapenem: S <=0.5      -  Gentamicin: S <=2      -  Levofloxacin: R >4      -  Meropenem: S <=1      -  Piperacillin/Tazobactam: R <=8      -  Tobramycin: S <=2      -  Trimethoprim/Sulfamethoxazole: R >2/38      Method Type: MIGUEL A  Organism: Pseudomonas aeruginosa (Carbapenem Resistant) (06-22-21 @ 08:46)      -  Amikacin: S <=16      -  Aztreonam: S <=4      -  Cefepime: S 8      -  Ceftazidime: S 8      -  Ciprofloxacin: R >2      -  Gentamicin: S 4      -  Imipenem: R >8      -  Levofloxacin: R >4      -  Meropenem: I 4      -  Piperacillin/Tazobactam: I 32      -  Tobramycin: S <=2      Method Type: MIGUEL A    Culture - Blood (collected 06-18-21 @ 09:29)  Source: .Blood None  Gram Stain (06-19-21 @ 07:48):    Upon re-evaluation of gram stain:    Growth in aerobic and anaerobic bottles: Gram Negative Rods and Gram    Positive Cocci in Pairs and Chains  Final Report (06-22-21 @ 12:03):    Growth in aerobic and anaerobic bottles: Enterococcus faecalis    Growth in aerobic and anaerobic bottles: Pseudomonas aeruginosa    (Carbapenem Resistant)    Growth in anaerobic bottle: Proteus mirabilis ESBL    ***Blood Panel PCR results on this specimenare available    approximately 3 hours after the Gram stain result.***    Gram stain, PCR, and/or culture results may not always    correspond due to difference in methodologies.    ************************************************************    This PCR assaywas performed by multiplex PCR. This    Assay tests for 66 bacterial and resistance gene targets.    Please refer to the [facility] test directory    at [email or web address] for details.  Organism: Blood Culture PCR  Blood Culture PCR  Enterococcus faecalis  Pseudomonas aeruginosa (Carbapenem Resistant)  Proteus mirabilis ESBL (06-22-21 @ 12:07)  Organism: Proteus mirabilis ESBL (06-22-21 @ 12:07)      -  Amikacin: R >32      -  Ampicillin: R >16 These ampicillin results predict results for amoxicillin      -  Ampicillin/Sulbactam: R 8/4 Enterobacter, Citrobacter, and Serratia may develop resistance during prolonged therapy (3-4 days)      -  Aztreonam: R >16      -  Cefazolin: R >16 Enterobacter, Citrobacter, and Serratia may develop resistance during prolonged therapy (3-4 days)      -  Cefepime: R >16      -  Cefoxitin: R >16      -  Ceftazidime/Avibactam: R >16      -  Ceftolozane/tazobactam: S <=2      -  Ceftriaxone: R >32 Enterobacter, Citrobacter, and Serratia may develop resistance during prolonged therapy      -  Ciprofloxacin: R >2      -  Ertapenem: R >1      -  Gentamicin: I 8      -  Levofloxacin: R >4      -  Meropenem: I 2      -  Piperacillin/Tazobactam: R <=8      -  Tobramycin: S <=2      -  Trimethoprim/Sulfamethoxazole: R >2/38      Method Type: MIGUEL A  Organism: Pseudomonas aeruginosa (Carbapenem Resistant) (06-22-21 @ 12:07)      -  Amikacin: S <=16      -  Aztreonam: S 8      -  Cefepime: I 16      -  Ceftazidime: I 16      -  Ciprofloxacin: R >2      -  Gentamicin: S 4      -  Imipenem: R >8      -  Levofloxacin: R >4      -  Meropenem: R 8      -  Piperacillin/Tazobactam: I 64      -  Tobramycin: S <=2      Method Type: MIGUEL A  Organism: Enterococcus faecalis (06-22-21 @ 12:06)      -  Ampicillin: S <=2 Predicts results to ampicillin/sulbactam, amoxacillin-clavulanate and  piperacillin-tazobactam.      -  Gentamicin synergy: S      -  Vancomycin: S 2      Method Type: MIGUEL A  Organism: Blood Culture PCR (06-22-21 @ 12:04)      -  Pseudomonas aeruginosa: Detec      Method Type: PCR  Organism: Blood Culture PCR (06-22-21 @ 12:04)      -  Enterococcus faecalis,VRE: Detec      Method Type: PCR    Culture - Urine (collected 06-17-21 @ 16:27)  Source: .Urine Clean Catch (Midstream)  Final Report (06-21-21 @ 11:36):    >=3 organisms. Probable collection contamination.        Lactate, Blood: 1.7 mmol/L (06-20-21 @ 09:10)  Lactate, Blood: 2.0 mmol/L (06-19-21 @ 07:11)  Lactate, Blood: 2.3 mmol/L (06-18-21 @ 22:02)  Lactate, Blood: 1.8 mmol/L (06-18-21 @ 09:29)      INFECTIOUS DISEASES TESTING  COVID-19 PCR: NotDetec (06-17-21 @ 22:22)  Rapid RVP Result: NotDetec (04-01-21 @ 11:14)  COVID-19 PCR: NotDetec (09-14-20 @ 15:45)  COVID-19 PCR: NotDetec (09-08-20 @ 14:19)      INFLAMMATORY MARKERS  C-Reactive Protein, Serum: 120 mg/L (06-18-21 @ 09:29)      RADIOLOGY & ADDITIONAL TESTS:  I have personally reviewed the last available Chest xray  CXR      CT      CARDIOLOGY TESTING  12 Lead ECG:   Ventricular Rate 85 BPM    Atrial Rate 85 BPM    P-R Interval 122 ms    QRS Duration 80 ms    Q-T Interval 380 ms    QTC Calculation(Bazett) 452 ms    P Axis 46 degrees    R Axis 22 degrees    T Axis -10 degrees    Diagnosis Line Normal sinus rhythm  Nonspecific T wave abnormality minor  Otherwise normal ECG    Confirmed by YESICA SALAZAR MD ([id number]) on 6/19/2021 10:46:39 AM (06-19-21 @ 07:53)  12 Lead ECG:   Ventricular Rate 112 BPM    Atrial Rate 112 BPM    P-R Interval 120 ms    QRS Duration 84 ms    Q-T Interval 348 ms    QTC Calculation(Bazett) 475 ms    P Axis 45 degrees    R Axis 36 degrees    T Axis 4 degrees    Diagnosis Line Sinus tachycardia  Otherwise normal ECG    Confirmed by EBENEZER EDDY MD ([id number]) on 6/18/2021 4:15:45 PM (06-18-21 @ 14:35)      MEDICATIONS  chlorhexidine 4% Liquid 1 Topical <User Schedule>  enoxaparin Injectable 40 SubCutaneous daily  furosemide   Injectable 40 IV Push daily  meropenem  IVPB 2000 IV Intermittent every 8 hours  PHENobarbital Injectable 60 IV Push daily  polymyxin B IVPB [id number] IV Intermittent every 12 hours  predniSONE   Tablet 40 Oral daily  tamsulosin 0.4 Oral at bedtime      WEIGHT  Weight (kg): 57.4 (06-18-21 @ 05:00)      ANTIBIOTICS:  meropenem  IVPB 2000 milliGRAM(s) IV Intermittent every 8 hours  polymyxin B IVPB [id number] Unit(s) IV Intermittent every 12 hours      All available historical records have been reviewed

## 2021-06-24 ENCOUNTER — APPOINTMENT (OUTPATIENT)
Dept: UROLOGY | Facility: CLINIC | Age: 66
End: 2021-06-24

## 2021-06-24 LAB
ALBUMIN SERPL ELPH-MCNC: 3.4 G/DL — LOW (ref 3.5–5.2)
ALP SERPL-CCNC: 81 U/L — SIGNIFICANT CHANGE UP (ref 30–115)
ALT FLD-CCNC: 21 U/L — SIGNIFICANT CHANGE UP (ref 0–41)
ANION GAP SERPL CALC-SCNC: 12 MMOL/L — SIGNIFICANT CHANGE UP (ref 7–14)
AST SERPL-CCNC: 27 U/L — SIGNIFICANT CHANGE UP (ref 0–41)
BASOPHILS # BLD AUTO: 0.01 K/UL — SIGNIFICANT CHANGE UP (ref 0–0.2)
BASOPHILS NFR BLD AUTO: 0.1 % — SIGNIFICANT CHANGE UP (ref 0–1)
BILIRUB SERPL-MCNC: 0.4 MG/DL — SIGNIFICANT CHANGE UP (ref 0.2–1.2)
BLD GP AB SCN SERPL QL: SIGNIFICANT CHANGE UP
BUN SERPL-MCNC: 27 MG/DL — HIGH (ref 10–20)
CALCIUM SERPL-MCNC: 8.7 MG/DL — SIGNIFICANT CHANGE UP (ref 8.5–10.1)
CHLORIDE SERPL-SCNC: 98 MMOL/L — SIGNIFICANT CHANGE UP (ref 98–110)
CO2 SERPL-SCNC: 32 MMOL/L — SIGNIFICANT CHANGE UP (ref 17–32)
CREAT SERPL-MCNC: 0.7 MG/DL — SIGNIFICANT CHANGE UP (ref 0.7–1.5)
CULTURE RESULTS: SIGNIFICANT CHANGE UP
EOSINOPHIL # BLD AUTO: 0.35 K/UL — SIGNIFICANT CHANGE UP (ref 0–0.7)
EOSINOPHIL NFR BLD AUTO: 4.1 % — SIGNIFICANT CHANGE UP (ref 0–8)
GLUCOSE SERPL-MCNC: 91 MG/DL — SIGNIFICANT CHANGE UP (ref 70–99)
HCT VFR BLD CALC: 40.2 % — LOW (ref 42–52)
HGB BLD-MCNC: 13.3 G/DL — LOW (ref 14–18)
IMM GRANULOCYTES NFR BLD AUTO: 1.9 % — HIGH (ref 0.1–0.3)
LYMPHOCYTES # BLD AUTO: 1.34 K/UL — SIGNIFICANT CHANGE UP (ref 1.2–3.4)
LYMPHOCYTES # BLD AUTO: 15.6 % — LOW (ref 20.5–51.1)
MAGNESIUM SERPL-MCNC: 1.9 MG/DL — SIGNIFICANT CHANGE UP (ref 1.8–2.4)
MCHC RBC-ENTMCNC: 30.6 PG — SIGNIFICANT CHANGE UP (ref 27–31)
MCHC RBC-ENTMCNC: 33.1 G/DL — SIGNIFICANT CHANGE UP (ref 32–37)
MCV RBC AUTO: 92.4 FL — SIGNIFICANT CHANGE UP (ref 80–94)
MONOCYTES # BLD AUTO: 0.93 K/UL — HIGH (ref 0.1–0.6)
MONOCYTES NFR BLD AUTO: 10.8 % — HIGH (ref 1.7–9.3)
NEUTROPHILS # BLD AUTO: 5.82 K/UL — SIGNIFICANT CHANGE UP (ref 1.4–6.5)
NEUTROPHILS NFR BLD AUTO: 67.5 % — SIGNIFICANT CHANGE UP (ref 42.2–75.2)
NRBC # BLD: 0 /100 WBCS — SIGNIFICANT CHANGE UP (ref 0–0)
PLATELET # BLD AUTO: 189 K/UL — SIGNIFICANT CHANGE UP (ref 130–400)
POTASSIUM SERPL-MCNC: 4.6 MMOL/L — SIGNIFICANT CHANGE UP (ref 3.5–5)
POTASSIUM SERPL-SCNC: 4.6 MMOL/L — SIGNIFICANT CHANGE UP (ref 3.5–5)
PROT SERPL-MCNC: 6.3 G/DL — SIGNIFICANT CHANGE UP (ref 6–8)
RBC # BLD: 4.35 M/UL — LOW (ref 4.7–6.1)
RBC # FLD: 13.1 % — SIGNIFICANT CHANGE UP (ref 11.5–14.5)
SODIUM SERPL-SCNC: 142 MMOL/L — SIGNIFICANT CHANGE UP (ref 135–146)
SPECIMEN SOURCE: SIGNIFICANT CHANGE UP
WBC # BLD: 8.61 K/UL — SIGNIFICANT CHANGE UP (ref 4.8–10.8)
WBC # FLD AUTO: 8.61 K/UL — SIGNIFICANT CHANGE UP (ref 4.8–10.8)

## 2021-06-24 PROCEDURE — 99232 SBSQ HOSP IP/OBS MODERATE 35: CPT

## 2021-06-24 PROCEDURE — 71045 X-RAY EXAM CHEST 1 VIEW: CPT | Mod: 26

## 2021-06-24 PROCEDURE — 74176 CT ABD & PELVIS W/O CONTRAST: CPT | Mod: 26

## 2021-06-24 RX ORDER — FUROSEMIDE 40 MG
40 TABLET ORAL DAILY
Refills: 0 | Status: DISCONTINUED | OUTPATIENT
Start: 2021-06-25 | End: 2021-06-26

## 2021-06-24 RX ADMIN — POLYMYXIN B SULFATE 500 UNIT(S): 500000 INJECTION, POWDER, LYOPHILIZED, FOR SOLUTION INTRAMUSCULAR; INTRATHECAL; INTRAVENOUS; OPHTHALMIC at 06:03

## 2021-06-24 RX ADMIN — NYSTATIN CREAM 1 APPLICATION(S): 100000 CREAM TOPICAL at 06:04

## 2021-06-24 RX ADMIN — NYSTATIN CREAM 1 APPLICATION(S): 100000 CREAM TOPICAL at 16:41

## 2021-06-24 RX ADMIN — MEROPENEM 200 MILLIGRAM(S): 1 INJECTION INTRAVENOUS at 13:14

## 2021-06-24 RX ADMIN — MEROPENEM 200 MILLIGRAM(S): 1 INJECTION INTRAVENOUS at 06:03

## 2021-06-24 RX ADMIN — CHLORHEXIDINE GLUCONATE 1 APPLICATION(S): 213 SOLUTION TOPICAL at 06:00

## 2021-06-24 RX ADMIN — Medication 40 MILLIGRAM(S): at 06:00

## 2021-06-24 RX ADMIN — MEROPENEM 200 MILLIGRAM(S): 1 INJECTION INTRAVENOUS at 21:55

## 2021-06-24 RX ADMIN — POLYMYXIN B SULFATE 500 UNIT(S): 500000 INJECTION, POWDER, LYOPHILIZED, FOR SOLUTION INTRAMUSCULAR; INTRATHECAL; INTRAVENOUS; OPHTHALMIC at 16:42

## 2021-06-24 RX ADMIN — Medication 40 MILLIGRAM(S): at 06:04

## 2021-06-24 NOTE — PROGRESS NOTE ADULT - ASSESSMENT
ASSESSMENT  64 years old Male with PMHx of Cerebral Palsy, Seizure disorder, spastic paraplegia s/p PEG, BPH, Hx of Nephrolithiasis s/p pcnl x 2 with known incompetent bladder neck s/p SPT presents to ED with Right sided flank pain. Patient had Right-sided PCNL 5/20.    IMPRESSION  #Septic shock requiring pressors secondary to Polymicrobial bacteremia with Pseudomonas and VRE secondary to pyelonephritis secondary to obstructing stone    s/p SPT exchange and R PCN    6/22 BCX NGTD     6/21 BCX NGTD     6/19 BCX NGTD     R kidney CX   Numerous Pseudomonas aeruginosa (Carbapenem Resistant) Multiple Morphological Strains    Numerous Proteus mirabilis ESBL    Numerous Staphylococcus aureus- MSSA    Numerous Actinotignum schaali group "Susceptibilities not performed"    6/18 BCX Pseudomonas, Enterococcus faecalis VRE    UA  WBC 10     CTAP 1.  Obstructing proximal right ureteral stone with moderate hydroureteronephrosis.  2.  Bladder wall thickening. Correlate with urinalysis.  #CT Bibasilar atelectasis.  Creatinine, Serum: 0.8 (06-19-21 @ 07:11)    Weight (kg): 57.4 (06-18-21 @ 05:00)    RECOMMENDATIONS  - Meropenem 2g q8h IV  - Polymyxin 700,000 units q12h IV. monitor Cr & lytes 6/22-  - Appreciate Urology consult- s/p SPT exchange 6/18  - Appreciate IR consult: s/p Percutaneous right nephrostomy  - Cleared from ID standpoint for any procedures  - pending CT AP re: PCN    If any questions, please call or send a message on [employer] Teams  Please continue to update ID with any pertinent new laboratory or radiographic findings  Spectra [id number]

## 2021-06-24 NOTE — PROGRESS NOTE ADULT - SUBJECTIVE AND OBJECTIVE BOX
TISH SHAIKH  65y, Male  Allergy: No Known Allergies      LOS  7d    CHIEF COMPLAINT: Nephrolithiasis (2021 08:15)      INTERVAL EVENTS/HPI  - No acute events overnight, PCN dislodged  - T(F): , Max: 99.2 (21 @ 20:00)  - Denies any worsening symptoms  - Tolerating medication  - WBC Count: 8.61 (21 @ 08:00)  WBC Count: 11.42 (21 @ 10:10)     - Creatinine, Serum: 0.7 (21 @ 08:00)  Creatinine, Serum: 0.5 (21 @ 10:10)       ROS  General: Denies rigors, nightsweats  HEENT: Denies headache, rhinorrhea, sore throat, eye pain  CV: Denies CP, palpitations  PULM: Denies wheezing, hemoptysis  GI: Denies hematemesis, hematochezia, melena  : Denies discharge, hematuria  MSK: Denies arthralgias, myalgias  SKIN: Denies rash, lesions  NEURO: Denies paresthesias, weakness  PSYCH: Denies depression, anxiety    VITALS:  T(F): 97.6, Max: 99.2 (21 @ 20:00)  HR: 73  BP: 85/51  RR: 18Vital Signs Last 24 Hrs  T(C): 36.4 (2021 08:31), Max: 37.3 (2021 20:00)  T(F): 97.6 (2021 08:31), Max: 99.2 (2021 20:00)  HR: 73 (2021 08:31) (73 - 114)  BP: 85/51 (2021 08:31) (85/51 - 114/55)  BP(mean): 62 (2021 08:31) (62 - 79)  RR: 18 (2021 08:31) (18 - 18)  SpO2: 98% (2021 12:30) (98% - 98%)    PHYSICAL EXAM:  Gen: NAD, resting in bed chronically ill appearing contracted  HEENT: Normocephalic, atraumatic  Neck: supple, no lymphadenopathy  CV: Regular rate & regular rhythm  Lungs: decreased BS at bases, no fremitus  Abdomen: Soft, BS present PEG, PCN dislodged  Ext: Warm, well perfused  Neuro: non focal, awake  Skin: no rash, no erythema  Lines: no phlebitis    FH: Non-contributory  Social Hx: Non-contributory    TESTS & MEASUREMENTS:                        13.3   8.61  )-----------( 189      ( 2021 08:00 )             40.2         142  |  98  |  27<H>  ----------------------------<  91  4.6   |  32  |  0.7    Ca    8.7      2021 08:00  Phos  3.2       Mg     1.9         TPro  6.3  /  Alb  3.4<L>  /  TBili  0.4  /  DBili  x   /  AST  27  /  ALT  21  /  AlkPhos  81      eGFR if Non African American: 99 mL/min/1.73M2 (21 @ 08:00)  eGFR if : 115 mL/min/1.73M2 (21 @ 08:00)    LIVER FUNCTIONS - ( 2021 08:00 )  Alb: 3.4 g/dL / Pro: 6.3 g/dL / ALK PHOS: 81 U/L / ALT: 21 U/L / AST: 27 U/L / GGT: x           Urinalysis Basic - ( 2021 15:20 )    Color: Light Brown / Appearance: Slightly Turbid / S.014 / pH: x  Gluc: x / Ketone: Small  / Bili: Negative / Urobili: <2 mg/dL   Blood: x / Protein: 30 mg/dL / Nitrite: Negative   Leuk Esterase: Negative / RBC: >720 /HPF / WBC 10 /HPF   Sq Epi: x / Non Sq Epi: 6 /HPF / Bacteria: Negative        Culture - Urine (collected 21 @ 16:00)  Source: Kidney Nephrostomy - Right  Preliminary Report (21 @ 19:47):    No growth    Culture - Blood (collected 21 @ 06:22)  Source: .Blood None  Preliminary Report (21 @ 13:02):    No growth to date.    Culture - Blood (collected 21 @ 07:17)  Source: .Blood None  Preliminary Report (21 @ 19:02):    No growth to date.    Culture - Blood (collected 21 @ 19:24)  Source: .Blood Blood-Peripheral  Preliminary Report (21 @ 01:02):    No growth to date.    Culture - Urine (collected 21 @ 14:23)  Source: Kidney Right Kidney  Final Report (21 @ 08:46):    Numerous Pseudomonas aeruginosa (Carbapenem Resistant) Multiple    Morphological Strains    Numerous Proteus mirabilis ESBL    Numerous Staphylococcus aureus    Numerous Actinotignum schaali group "Susceptibilities not performed"  Organism: Pseudomonas aeruginosa (Carbapenem Resistant)  Proteus mirabilis ESBL  Enterococcus faecalis  Staphylococcus aureus (21 @ 08:46)  Organism: Staphylococcus aureus (21 @ 08:46)      -  Ampicillin/Sulbactam: S <=8/4      -  Cefazolin: S <=4      -  Gentamicin: S <=1 Should not be used as monotherapy      -  Oxacillin: S 1      -  Penicillin: R >8      -  RIF- Rifampin: S <=1 Should not be used as monotherapy      -  Tetra/Doxy: S <=1      -  Trimethoprim/Sulfamethoxazole: S <=0.5/9.5      -  Vancomycin: S 2      Method Type: MIGUEL A  Organism: Enterococcus faecalis (21 @ 08:46)      -  Ampicillin: S <=2 Predicts results to ampicillin/sulbactam, amoxacillin-clavulanate and  piperacillin-tazobactam.      -  Ciprofloxacin: S <=1      -  Levofloxacin: S <=1      -  Tetra/Doxy: R >8      -  Vancomycin: S 2      Method Type: MIGUEL A  Organism: Proteus mirabilis ESBL (21 @ 08:46)      -  Amikacin: S <=16      -  Amoxicillin/Clavulanic Acid: S <=8/4      -  Ampicillin: R >16 These ampicillin results predict results for amoxicillin      -  Ampicillin/Sulbactam: R 8/4 Enterobacter, Citrobacter, and Serratia may develop resistance during prolonged therapy (3-4 days)      -  Aztreonam: R >16      -  Cefazolin: R >16 ([id number]) For uncomplicated UTI with K. pneumoniae, E. coli, or P. mirablis: MIGUEL A <=16 is sensitive and MIGUEL A >=32 is resistant. This also predicts results for oral agents cefaclor, cefdinir, cefpodoxime, cefprozil, cefuroxime axetil, cephalexin and locarbef for uncomplicated UTI. Note that some isolates may be susceptible to these agents while testing resistant to cefazolin.      -  Cefepime: R >16      -  Cefoxitin: S <=8      -  Ceftriaxone: R >32 Enterobacter, Citrobacter, and Serratia may develop resistance during prolonged therapy      -  Ciprofloxacin: R >2      -  Ertapenem: S <=0.5      -  Gentamicin: S <=2      -  Levofloxacin: R >4      -  Meropenem: S <=1      -  Piperacillin/Tazobactam: R <=8      -  Tobramycin: S <=2      -  Trimethoprim/Sulfamethoxazole: R >2/38      Method Type: MIGUEL A  Organism: Pseudomonas aeruginosa (Carbapenem Resistant) (21 @ 08:46)      -  Amikacin: S <=16      -  Aztreonam: S <=4      -  Cefepime: S 8      -  Ceftazidime: S 8      -  Ciprofloxacin: R >2      -  Gentamicin: S 4      -  Imipenem: R >8      -  Levofloxacin: R >4      -  Meropenem: I 4      -  Piperacillin/Tazobactam: I 32      -  Tobramycin: S <=2      Method Type: MIGUEL A    Culture - Blood (collected 21 @ 09:29)  Source: .Blood None  Gram Stain (21 @ 07:48):    Upon re-evaluation of gram stain:    Growth in aerobic and anaerobic bottles: Gram Negative Rods and Gram    Positive Cocci in Pairs and Chains  Final Report (21 @ 12:03):    Growth in aerobic and anaerobic bottles: Enterococcus faecalis    Growth in aerobic and anaerobic bottles: Pseudomonas aeruginosa    (Carbapenem Resistant)    Growth in anaerobic bottle: Proteus mirabilis ESBL    ***Blood Panel PCR results on this specimenare available    approximately 3 hours after the Gram stain result.***    Gram stain, PCR, and/or culture results may not always    correspond due to difference in methodologies.    ************************************************************    This PCR assaywas performed by multiplex PCR. This    Assay tests for 66 bacterial and resistance gene targets.    Please refer to the [facility] test directory    at [email or web address] for details.  Organism: Blood Culture PCR  Blood Culture PCR  Enterococcus faecalis  Pseudomonas aeruginosa (Carbapenem Resistant)  Proteus mirabilis ESBL (21 @ 12:07)  Organism: Proteus mirabilis ESBL (21 @ 12:07)      -  Amikacin: R >32      -  Ampicillin: R >16 These ampicillin results predict results for amoxicillin      -  Ampicillin/Sulbactam: R 8/4 Enterobacter, Citrobacter, and Serratia may develop resistance during prolonged therapy (3-4 days)      -  Aztreonam: R >16      -  Cefazolin: R >16 Enterobacter, Citrobacter, and Serratia may develop resistance during prolonged therapy (3-4 days)      -  Cefepime: R >16      -  Cefoxitin: R >16      -  Ceftazidime/Avibactam: R >16      -  Ceftolozane/tazobactam: S <=2      -  Ceftriaxone: R >32 Enterobacter, Citrobacter, and Serratia may develop resistance during prolonged therapy      -  Ciprofloxacin: R >2      -  Ertapenem: R >1      -  Gentamicin: I 8      -  Levofloxacin: R >4      -  Meropenem: I 2      -  Piperacillin/Tazobactam: R <=8      -  Tobramycin: S <=2      -  Trimethoprim/Sulfamethoxazole: R >2/38      Method Type: MIGUEL A  Organism: Pseudomonas aeruginosa (Carbapenem Resistant) (21 @ 12:07)      -  Amikacin: S <=16      -  Aztreonam: S 8      -  Cefepime: I 16      -  Ceftazidime: I 16      -  Ciprofloxacin: R >2      -  Gentamicin: S 4      -  Imipenem: R >8      -  Levofloxacin: R >4      -  Meropenem: R 8      -  Piperacillin/Tazobactam: I 64      -  Tobramycin: S <=2      Method Type: MIGUEL A  Organism: Enterococcus faecalis (21 @ 12:06)      -  Ampicillin: S <=2 Predicts results to ampicillin/sulbactam, amoxacillin-clavulanate and  piperacillin-tazobactam.      -  Gentamicin synergy: S      -  Vancomycin: S 2      Method Type: MIGUEL A  Organism: Blood Culture PCR (21 @ 12:04)      -  Pseudomonas aeruginosa: Detec      Method Type: PCR  Organism: Blood Culture PCR (21 @ 12:04)      -  Enterococcus faecalis,VRE: Detec      Method Type: PCR    Culture - Urine (collected 21 @ 16:27)  Source: .Urine Clean Catch (Midstream)  Final Report (21 @ 11:36):    >=3 organisms. Probable collection contamination.        Lactate, Blood: 1.7 mmol/L (21 @ 09:10)      INFECTIOUS DISEASES TESTING  COVID- PCR: NotDetec (21 @ 22:22)  Rapid RVP Result: NotDetec (21 @ 11:14)  COVID- PCR: NotDetec (20 @ 15:45)  COVID- PCR: NotDetec (20 @ 14:19)      INFLAMMATORY MARKERS  C-Reactive Protein, Serum: 120 mg/L (21 @ 09:29)      RADIOLOGY & ADDITIONAL TESTS:  I have personally reviewed the last available Chest xray  CXR      CT      CARDIOLOGY TESTING  12 Lead ECG:   Ventricular Rate 85 BPM    Atrial Rate 85 BPM    P-R Interval 122 ms    QRS Duration 80 ms    Q-T Interval 380 ms    QTC Calculation(Bazett) 452 ms    P Axis 46 degrees    R Axis 22 degrees    T Axis -10 degrees    Diagnosis Line Normal sinus rhythm  Nonspecific T wave abnormality minor  Otherwise normal ECG    Confirmed by YESICA SALAZAR MD ([id number]) on 2021 10:46:39 AM (21 @ 07:53)  12 Lead ECG:   Ventricular Rate 112 BPM    Atrial Rate 112 BPM    P-R Interval 120 ms    QRS Duration 84 ms    Q-T Interval 348 ms    QTC Calculation(Bazett) 475 ms    P Axis 45 degrees    R Axis 36 degrees    T Axis 4 degrees    Diagnosis Line Sinus tachycardia  Otherwise normal ECG    Confirmed by EBENEZER EDDY MD ([id number]) on 2021 4:15:45 PM (21 @ 14:35)      MEDICATIONS  chlorhexidine 4% Liquid 1 Topical <User Schedule>  meropenem  IVPB 2000 IV Intermittent every 8 hours  nystatin Powder 1 Topical two times a day  PHENobarbital 64.8 Oral daily  polymyxin B IVPB 954150 IV Intermittent every 12 hours  predniSONE   Tablet 20 Oral daily  tamsulosin 0.4 Oral at bedtime      WEIGHT  Weight (kg): 57.4 (21 @ 05:00)  Creatinine, Serum: 0.7 mg/dL (21 @ 08:00)      ANTIBIOTICS:  meropenem  IVPB 2000 milliGRAM(s) IV Intermittent every 8 hours  polymyxin B IVPB 556949 Unit(s) IV Intermittent every 12 hours      All available historical records have been reviewed

## 2021-06-24 NOTE — PROGRESS NOTE ADULT - ASSESSMENT
IMPRESSION:    Sepsis present on admission better  Septic shock, improving  Obstructive pyelonephritis s/p Right PCN ?  dislodged/ hematuria  Pseudomonas & E. faecalis VRE bacteremia  Chronic suprapubic alatorre  Seizure yesterday? HO Seizures  HO Cerebral palsy chronically contracted      PLAN:    CNS:  Avoid CNS depressants.     HEENT:  Oral care.  Aspiration precautions.    PULMONARY:  HOB @ 45 degrees. CXR today.  Wean O2 as tolerated, Goal 88 to 94%, NC, dec prednisone    CARDIOVASCULAR:  lo lasix    GI: GI prophylaxis. NPO. CT AP    RENAL: FU lytes.  Correct as needed.  Monitor UO.  Monitor nephrostomy drain.    INFECTIOUS DISEASE:  ABX PER ID    HEMATOLOGICAL:  DVT prophylaxis. serial CBC    ENDOCRINE:  Follow up FS.  Insulin protocol if needed.    MUSCULOSKELETAL: bed rest    CODE STATUS: FULL CODE    DISPOSITION: SDU

## 2021-06-24 NOTE — PROGRESS NOTE ADULT - ASSESSMENT
65 year old  male s/p right PCN placement by IR on 6/18 with sepsis & hypotension - awaiting PCNL when medically optimal. Pt seen and examined at bedside this morning, pt doing better still on high flow O2. Denies any fevers, N/V, abdominal pain or flank pain.    ·	Patient seen and examined at bedside with Dr. Joyner, plan is as follows  ·	IR recs appreciated; f/u CT noncon showed  Since June 17, 2021, interval placement of a right-sided percutaneous nephrostomy tube, appropriately positioned with decreased now mild hydroureteronephrosis; 2 right renal pelvic/UPJ calculi above.  ·	Cont to monitor I&O's   ·	Cont abx as per ID  ·	ID rec's appreciated; Repeat BCx are NG, would given at least 72h on this new antimicrobial therapy prior to intervention  ·	As per ID reccs: c/w Meropenem and Polymyxin; as of 6/24, cleared for any procedures  ·	f/u repeat Cx taken from PCN 6/22 showed no growth  ·	When medically optimal IR consult to convert nephrostomy to a nephro-ureteral stent via mid pole calyx- PCNL scheduled tentatively for next Tuesday if cleared   ·	F/u Cardiology and Medical clearances prior to procedure  ·	Obtain COVID test 3 days prior to procedure

## 2021-06-24 NOTE — PROGRESS NOTE ADULT - SUBJECTIVE AND OBJECTIVE BOX
TISH SAHIKH 65y Male  MRN#: [id number]   Hospital Day: 7d    SUBJECTIVE  Patient is a 65y old  Male who presents with a chief complaint of Nephrolithiasis (2021 08:11)  Brought in from Nursing home on . Pt has PMHx of Cerebral Palsy, Seizure disorder, spastic paraplegia s/p PEG, BPH, Hx of Nephrolithiasis s/p pcnl x 2 with known incompetent bladder neck s/p SPT Presented to ED on  with R flank pain.         INTERVAL HPI AND OVERNIGHT EVENTS:  Pt had nephrostomy tube dislodged the previous day on . He has no acute complaints, but has cole hematuria and blood from nephrostomy drain.         OBJECTIVE  PAST MEDICAL & SURGICAL HISTORY  BPH (benign prostatic hyperplasia)    Cerebral palsy    Seizure  last seizure &gt;10 years ago    Osteoporosis    Spastic quadriplegia    Urinary calculi    Urinary retention    Asthma    S/P percutaneous endoscopic gastrostomy (PEG) tube placement    H/O cystoscopy    Suprapubic catheter      ALLERGIES:  No Known Allergies    MEDICATIONS:  STANDING MEDICATIONS  chlorhexidine 4% Liquid 1 Application(s) Topical <User Schedule>  meropenem  IVPB 2000 milliGRAM(s) IV Intermittent every 8 hours  nystatin Powder 1 Application(s) Topical two times a day  PHENobarbital 64.8 milliGRAM(s) Oral daily  polymyxin B IVPB 149048 Unit(s) IV Intermittent every 12 hours  predniSONE   Tablet 20 milliGRAM(s) Oral daily  tamsulosin 0.4 milliGRAM(s) Oral at bedtime    PRN MEDICATIONS  acetaminophen  Suppository .. 650 milliGRAM(s) Rectal every 6 hours PRN  ALBUTerol    90 MICROgram(s) HFA Inhaler 2 Puff(s) Inhalation every 6 hours PRN  morphine  - Injectable 4 milliGRAM(s) IV Push every 6 hours PRN      PHYSICAL EXAM:  GENERAL: NAD, Resting in bed  HEENT: Full ROM in bilateral eyes  CHEST/LUNG: (+) breath sounds in all lung fields; no wheezes noted  HEART: S1 S2 regular rate and rhythm; No murmurs  ABDOMEN: Bowel sounds present; Soft, Nontender to palpation, non-distended  EXTREMITIES:  contracted; non edematous  : Cole blood noted in both alatorre and nephrostomy tube; no flank tenderness to palpation; chronic suprapubic alatorre site is clean.       VITAL SIGNS: Last 24 Hours  T(C): 37.4 (2021 15:53), Max: 37.4 (2021 15:53)  T(F): 99.4 (2021 15:53), Max: 99.4 (2021 15:53)  HR: 89 (2021 15:53) (73 - 114)  BP: 98/55 (2021 15:53) (85/51 - 114/55)  BP(mean): 73 (2021 15:53) (62 - 79)  RR: 20 (2021 15:53) (18 - 20)  SpO2: --    LABS:                        13.3   8.61  )-----------( 189      ( 2021 08:00 )             40.2     06-24    142  |  98  |  27<H>  ----------------------------<  91  4.6   |  32  |  0.7    Ca    8.7      2021 08:00  Phos  3.2     06-23  Mg     1.9     06-24    TPro  6.3  /  Alb  3.4<L>  /  TBili  0.4  /  DBili  x   /  AST  27  /  ALT  21  /  AlkPhos  81  06-24      Urinalysis Basic - ( 2021 15:20 )    Color: Light Brown / Appearance: Slightly Turbid / S.014 / pH: x  Gluc: x / Ketone: Small  / Bili: Negative / Urobili: <2 mg/dL   Blood: x / Protein: 30 mg/dL / Nitrite: Negative   Leuk Esterase: Negative / RBC: >720 /HPF / WBC 10 /HPF   Sq Epi: x / Non Sq Epi: 6 /HPF / Bacteria: Negative            Culture - Urine (collected 2021 16:00)  Source: Kidney Nephrostomy - Right  Preliminary Report (2021 19:47):    No growth    Culture - Blood (collected 2021 06:22)  Source: .Blood None  Preliminary Report (2021 13:02):    No growth to date.          RADIOLOGY:    < from: CT Abdomen and Pelvis No Cont (21 @ 13:59) >  IMPRESSION:  1.  Since 2021, interval placement of a right-sided percutaneous nephrostomy tube, appropriately positioned with decreased now mild hydroureteronephrosis; 2 right renal pelvic/UPJ calculi above.    2.  Appropriately positioned gastrostomy tube.    3.  New small bilateral pleural effusions and increased bibasilar atelectasis.    4.  Additional stable findings as above.        < from: Xray Chest 1 View- PORTABLE-Routine (Xray Chest 1 View- PORTABLE-Routine in AM.) (21 @ 06:09) >  Impression:    Unchanged left lower lung opacity/effusion.    < end of copied text >           TISH SHAIKH 65y Male  MRN#: [id number]   Hospital Day: 7d    SUBJECTIVE  Patient is a 65y old  Male who presents with a chief complaint of Nephrolithiasis (2021 08:11)  Brought in from Nursing home on . Pt has PMHx of Cerebral Palsy, Seizure disorder, spastic paraplegia s/p PEG, BPH, Hx of Nephrolithiasis s/p pcnl x 2 with known incompetent bladder neck s/p SPT Presented to ED on  with R flank pain.         INTERVAL HPI AND OVERNIGHT EVENTS:  Pt had nephrostomy tube dislodged the previous day on . He has no acute complaints, but has cole hematuria and blood from nephrostomy drain.         OBJECTIVE  PAST MEDICAL & SURGICAL HISTORY  BPH (benign prostatic hyperplasia)    Cerebral palsy    Seizure  last seizure &gt;10 years ago    Osteoporosis    Spastic quadriplegia    Urinary calculi    Urinary retention    Asthma    S/P percutaneous endoscopic gastrostomy (PEG) tube placement    H/O cystoscopy    Suprapubic catheter      ALLERGIES:  No Known Allergies    MEDICATIONS:  STANDING MEDICATIONS  chlorhexidine 4% Liquid 1 Application(s) Topical <User Schedule>  meropenem  IVPB 2000 milliGRAM(s) IV Intermittent every 8 hours  nystatin Powder 1 Application(s) Topical two times a day  PHENobarbital 64.8 milliGRAM(s) Oral daily  polymyxin B IVPB 255367 Unit(s) IV Intermittent every 12 hours  predniSONE   Tablet 20 milliGRAM(s) Oral daily  tamsulosin 0.4 milliGRAM(s) Oral at bedtime    PRN MEDICATIONS  acetaminophen  Suppository .. 650 milliGRAM(s) Rectal every 6 hours PRN  ALBUTerol    90 MICROgram(s) HFA Inhaler 2 Puff(s) Inhalation every 6 hours PRN  morphine  - Injectable 4 milliGRAM(s) IV Push every 6 hours PRN      PHYSICAL EXAM:  GENERAL: NAD, Resting in bed  HEENT: Full ROM in bilateral eyes  CHEST/LUNG: (+) breath sounds in all lung fields; no wheezes noted  HEART: S1 S2 regular rate and rhythm; No murmurs  ABDOMEN: Bowel sounds present; Soft, Nontender to palpation, non-distended  EXTREMITIES:  contracted; non edematous; warm and well perfused  : Cole blood noted in both alatorre and nephrostomy tube; no flank tenderness to palpation; chronic suprapubic alatorre site is clean.       VITAL SIGNS: Last 24 Hours  T(C): 37.4 (2021 15:53), Max: 37.4 (2021 15:53)  T(F): 99.4 (2021 15:53), Max: 99.4 (2021 15:53)  HR: 89 (2021 15:53) (73 - 114)  BP: 98/55 (2021 15:53) (85/51 - 114/55)  BP(mean): 73 (2021 15:53) (62 - 79)  RR: 20 (2021 15:53) (18 - 20)  SpO2: --    LABS:                        13.3   8.61  )-----------( 189      ( 2021 08:00 )             40.2     06-24    142  |  98  |  27<H>  ----------------------------<  91  4.6   |  32  |  0.7    Ca    8.7      2021 08:00  Phos  3.2     06-23  Mg     1.9     06-24    TPro  6.3  /  Alb  3.4<L>  /  TBili  0.4  /  DBili  x   /  AST  27  /  ALT  21  /  AlkPhos  81  06-24      Urinalysis Basic - ( 2021 15:20 )    Color: Light Brown / Appearance: Slightly Turbid / S.014 / pH: x  Gluc: x / Ketone: Small  / Bili: Negative / Urobili: <2 mg/dL   Blood: x / Protein: 30 mg/dL / Nitrite: Negative   Leuk Esterase: Negative / RBC: >720 /HPF / WBC 10 /HPF   Sq Epi: x / Non Sq Epi: 6 /HPF / Bacteria: Negative            Culture - Urine (collected 2021 16:00)  Source: Kidney Nephrostomy - Right  Preliminary Report (2021 19:47):    No growth    Culture - Blood (collected 2021 06:22)  Source: .Blood None  Preliminary Report (2021 13:02):    No growth to date.          RADIOLOGY:    < from: CT Abdomen and Pelvis No Cont (21 @ 13:59) >  IMPRESSION:  1.  Since 2021, interval placement of a right-sided percutaneous nephrostomy tube, appropriately positioned with decreased now mild hydroureteronephrosis; 2 right renal pelvic/UPJ calculi above.    2.  Appropriately positioned gastrostomy tube.    3.  New small bilateral pleural effusions and increased bibasilar atelectasis.    4.  Additional stable findings as above.        < from: Xray Chest 1 View- PORTABLE-Routine (Xray Chest 1 View- PORTABLE-Routine in AM.) (21 @ 06:09) >  Impression:    Unchanged left lower lung opacity/effusion.    < end of copied text >

## 2021-06-24 NOTE — PROGRESS NOTE ADULT - SUBJECTIVE AND OBJECTIVE BOX
OVERNIGHT EVENTS: events noted,  R PCN bloody urine, ? disclodged    Vital Signs Last 24 Hrs  T(C): 37.3 (2021 20:00), Max: 37.3 (2021 20:00)  T(F): 99.2 (2021 20:00), Max: 99.2 (2021 20:00)  HR: 114 (2021 20:00) (83 - 114)  BP: 114/55 (2021 20:00) (86/48 - 114/55)  BP(mean): 79 (2021 20:00) (65 - 79)  RR: 18 (2021 20:00) (18 - 18)  SpO2: 98% (2021 12:30) (98% - 98%)    PHYSICAL EXAMINATION:    GENERAL: ILL looking    HEENT: Head is normocephalic and atraumatic.     NECK: Supple.    LUNGS: dec bll both bases    HEART: MARCIAL 3.6    ABDOMEN: Soft, nontender, and nondistended.      EXTREMITIES: Without any cyanosis, clubbing, rash, lesions or edema.    NEUROLOGIC: Grossly intact.    SKIN: No ulceration or induration present.      LABS:                        11.6   11.42 )-----------( 164      ( 2021 10:10 )             35.4     06-    146  |  105  |  25<H>  ----------------------------<  124<H>  3.9   |  34<H>  |  0.5<L>    Ca    8.1<L>      2021 10:10  Phos  3.2     -  Mg     2.0         TPro  5.6<L>  /  Alb  3.1<L>  /  TBili  0.2  /  DBili  x   /  AST  12  /  ALT  15  /  AlkPhos  71  -23      Urinalysis Basic - ( 2021 15:20 )    Color: Light Brown / Appearance: Slightly Turbid / S.014 / pH: x  Gluc: x / Ketone: Small  / Bili: Negative / Urobili: <2 mg/dL   Blood: x / Protein: 30 mg/dL / Nitrite: Negative   Leuk Esterase: Negative / RBC: >720 /HPF / WBC 10 /HPF   Sq Epi: x / Non Sq Epi: 6 /HPF / Bacteria: Negative                        21 @ 07:01  -  21 @ 07:00  --------------------------------------------------------  IN: 1300 mL / OUT: 1427 mL / NET: -127 mL        MICROBIOLOGY:  Culture Results:   No growth ( @ 16:00)  Culture Results:   No growth to date. ( @ 06:22)  Culture Results:   No growth to date. ( @ 07:17)  Culture Results:   No growth to date. ( @ 19:24)      MEDICATIONS  (STANDING):  chlorhexidine 4% Liquid 1 Application(s) Topical <User Schedule>  enoxaparin Injectable 40 milliGRAM(s) SubCutaneous daily  furosemide   Injectable 40 milliGRAM(s) IV Push daily  meropenem  IVPB 2000 milliGRAM(s) IV Intermittent every 8 hours  nystatin Powder 1 Application(s) Topical two times a day  PHENobarbital 64.8 milliGRAM(s) Oral daily  polymyxin B IVPB 917604 Unit(s) IV Intermittent every 12 hours  predniSONE   Tablet 40 milliGRAM(s) Oral daily  tamsulosin 0.4 milliGRAM(s) Oral at bedtime    MEDICATIONS  (PRN):  acetaminophen  Suppository .. 650 milliGRAM(s) Rectal every 6 hours PRN Temp greater or equal to 38C (100.4F)  ALBUTerol    90 MICROgram(s) HFA Inhaler 2 Puff(s) Inhalation every 6 hours PRN Bronchospasm  morphine  - Injectable 4 milliGRAM(s) IV Push every 6 hours PRN Moderate Pain (4 - 6)      RADIOLOGY & ADDITIONAL STUDIES:

## 2021-06-24 NOTE — PROGRESS NOTE ADULT - ASSESSMENT
HOSPITAL COURSE:    65y M with PMHx of cerebral palsy, Seizure disorder, spastic paraplegia s/p PEG, BPH, Hx of Nephrolithiasis s/p pcnl x 2 (last oen in 5/20/21) with known incompetent bladder neck s/p Suprapubic Tube Placement admitted on 6/17/21 with sepsis 2/2 obstructive nephrolithiasis. CT revealed R proximal ureteral stone with moderate hydroureteronephrosis. On 6/18, pt decompensated to uroseptic shock (T 103F, BP 75/45). Emergent R Percutaneous Nephrolithotomy performed by IR on 6/18/2021, Levophed started. BP improved following procedure and LR bolus. Levophed was discontinued on 6/20, and patient has been hemodynamically stable since. Pt was weaned off HiFlow to nasal canula (6/22). Awaiting possible PCNL procedure.     ASSESSMENT & PLAN:  # Septic shock 2/2 bacteremia from pyelonephritis + obstructive nephrolithiasis - resolving   # obstructive urosepsis - resolving  - s/p R PCN on 6/18  - Blood Cultures 6/18 (+) for carbap. resistant Pseudomonas and e.Fecalis; Blood Culx 6/21: NGTD  - Urine Culx 6/18 (+) pseudomonas, proteus, ESBL, s aureus, actinotignum   - as per ID reccs: c/w Meropenem and Polymyxin  - f/u UA to correlate with bladder wall thickening seen on 6/17 CT   - when medically optimized, IR consult to convert nephrostomy to a nephro-ureteral stent and then eventual PCNL     #Gross Hematuria   # Bleeding from Nephrostomy tube   - CT 6/24: appropriately positioned nephrostomy tube with decreased now mild hydroureteronephrosis  - Hb Stable: 13.3 (6/24)  -     #acute hypoxic respiratory failure possibly secondary to acute seizure   - CXR 6/24: unchanged LLL opacity   - TTE: EF 62%, mild aortic stenosis  - c/w lasix 40mg IV daily   - stable on 4L NC (6/23)    #Seizure disorder  #Hx of cerebral palsy/spastic paraplegia s/p PEG tube   - was a rapid response in IR for seizure on 6/18  - 6/23: IV Phenobarb changed to -->64.8mg PO phenobarbital via peg qd  - Pheno level 6/23: 8.4, subtherapeutic, however patient has been maintained on this dose at NH     #BPH: c/w tamsulosin   #Asthma:  c/w inhalers    DVT ppx: lovenox  GI ppx: none  Diet: PEG feeds  Code Status: FULL CODE  Dispo: from nursing home; f/u Nasal cannula trial,     Followup:   - Trend lytes and Cr with new antibiotics (polymyxin and meropenem)                #Misc  - DVT Prophylaxis:  - GI Prophylaxis:  - Diet:  - Activity:  - IV Fluids:  - Code Status:    Dispo:   HOSPITAL COURSE:    65y M with PMHx of cerebral palsy, Seizure disorder, spastic paraplegia s/p PEG, BPH, Hx of Nephrolithiasis s/p pcnl x 2 (last oen in 5/20/21) with known incompetent bladder neck s/p Suprapubic Tube Placement admitted on 6/17/21 with sepsis 2/2 obstructive nephrolithiasis. CT revealed R proximal ureteral stone with moderate hydroureteronephrosis. On 6/18, pt decompensated to uroseptic shock (T 103F, BP 75/45). Emergent R Percutaneous Nephrolithotomy performed by IR on 6/18/2021, Levophed started. BP improved following procedure and LR bolus. Levophed was discontinued on 6/20, and patient has been hemodynamically stable since. Pt was weaned off HiFlow to nasal canula (6/22). Awaiting possible PCNL procedure.     ASSESSMENT & PLAN:  # Septic shock 2/2 bacteremia from pyelonephritis + obstructive nephrolithiasis - resolving   # obstructive urosepsis - resolving  - s/p R PCN on 6/18  - Blood Cultures 6/18 (+) for carbap. resistant Pseudomonas and e.Fecalis; Blood Culx 6/21: NGTD  - Urine Culx 6/18 (+) pseudomonas, proteus, ESBL, s aureus, actinotignum   - as per ID reccs: c/w Meropenem and Polymyxin; as of 6/24, cleared for any procedures  - f/u UA to correlate with bladder wall thickening seen on 6/17 CT   - when medically optimized, IR consult to convert nephrostomy to a nephro-ureteral stent and then eventual PCNL     #Gross Hematuria   # Bleeding from Nephrostomy tube   - CT 6/24: appropriately positioned nephrostomy tube with decreased now mild hydroureteronephrosis  - Hb Stable: 13.3 (6/24)    #acute hypoxic respiratory failure possibly secondary to acute seizure   - CXR 6/24: unchanged LLL opacity   - TTE: EF 62%, mild aortic stenosis  - c/w lasix 40mg IV daily   - stable on 4L NC (6/23)    #Seizure disorder  #Hx of cerebral palsy/spastic paraplegia s/p PEG tube   - was a rapid response in IR for seizure on 6/18  - 6/23: IV Phenobarb changed to -->64.8mg PO phenobarbital via peg qd  - Pheno level 6/23: 8.4, subtherapeutic, however patient has been maintained on this dose at NH     #BPH: c/w tamsulosin   #Asthma:  c/w inhalers    DVT ppx: lovenox  GI ppx: none  Diet: PEG feeds  Code Status: FULL CODE  Dispo: from nursing home; f/u Nasal cannula trial,     Followup:   - PCNL procedure tentatively scheduled for 6/29; contact IR  - COVID test prior to procedure     #Misc  - DVT Prophylaxis:  - GI Prophylaxis:  - Diet: Tube Feeds  - Code Status: Full Code

## 2021-06-24 NOTE — PROGRESS NOTE ADULT - SUBJECTIVE AND OBJECTIVE BOX
Urology Progress Note:  65 year old male with a proximal R ureteral stone s/p R PCN placement, awaiting R PCNL when medically optimal. Pt seen and examined at bedside, without acute complaints. RN yesterday at bedside reports R PCN with blood tinged urine; tubing may have been tugged while turning patient. Patient seen and examined at bedside today - no complaints at this time. R PCN still draining blood tinged urine. Pt denies flank pain, fevers/chills.     [ x ] A 10 Point Review of Systems was negative except where noted     MEDICATIONS  (STANDING):  chlorhexidine 4% Liquid 1 Application(s) Topical <User Schedule>  meropenem  IVPB 2000 milliGRAM(s) IV Intermittent every 8 hours  nystatin Powder 1 Application(s) Topical two times a day  PHENobarbital 64.8 milliGRAM(s) Oral daily  polymyxin B IVPB 450963 Unit(s) IV Intermittent every 12 hours  predniSONE   Tablet 20 milliGRAM(s) Oral daily  tamsulosin 0.4 milliGRAM(s) Oral at bedtime    MEDICATIONS  (PRN):  acetaminophen  Suppository .. 650 milliGRAM(s) Rectal every 6 hours PRN Temp greater or equal to 38C (100.4F)  ALBUTerol    90 MICROgram(s) HFA Inhaler 2 Puff(s) Inhalation every 6 hours PRN Bronchospasm  morphine  - Injectable 4 milliGRAM(s) IV Push every 6 hours PRN Moderate Pain (4 - 6)    acetaminophen  Suppository .. 650 milliGRAM(s) Rectal every 6 hours PRN  ALBUTerol    90 MICROgram(s) HFA Inhaler 2 Puff(s) Inhalation every 6 hours PRN  chlorhexidine 4% Liquid 1 Application(s) Topical <User Schedule>  meropenem  IVPB 2000 milliGRAM(s) IV Intermittent every 8 hours  morphine  - Injectable 4 milliGRAM(s) IV Push every 6 hours PRN  nystatin Powder 1 Application(s) Topical two times a day  PHENobarbital 64.8 milliGRAM(s) Oral daily  polymyxin B IVPB 587318 Unit(s) IV Intermittent every 12 hours  predniSONE   Tablet 20 milliGRAM(s) Oral daily  tamsulosin 0.4 milliGRAM(s) Oral at bedtime    Vital signs  T(C): , Max: 37.4 (06-24-21 @ 15:53)  HR: 89 (06-24-21 @ 15:53)  BP: 98/55 (06-24-21 @ 15:53)    PHYSICAL EXAM:  Constitutional: NAD, well-developed, comfortably laying in bed  HEENT: NC/AT  Back: No CVA tenderness bilaterally, R PCN in place draining clear blood tinged urine  Respiratory: No accessory respiratory muscle use, on NC  Abd: Soft, NT/ND, no suprapubic tenderness to palpation  Cardio: +S1/S2  : bladder not palpable, +SP tube in place draining blood tinged urine.  Neurological: Awake and alert  Psychiatric: Normal mood, normal affect  Skin: Good color, non diaphoretic    I&O's Summary  23 Jun 2021 07:01  -  24 Jun 2021 07:00  --------------------------------------------------------  IN: 1300 mL / OUT: 1427 mL / NET: -127 mL    24 Jun 2021 07:01  -  24 Jun 2021 18:39  --------------------------------------------------------  IN: 0 mL / OUT: 501 mL / NET: -501 mL    Labs                      13.3   8.61  )-----------( 189      ( 24 Jun 2021 08:00 )             40.2     24 Jun 2021 08:00    142    |  98     |  27     ----------------------------<  91     4.6     |  32     |  0.7      Ca    8.7        24 Jun 2021 08:00  Phos  3.2       23 Jun 2021 10:10  Mg     1.9       24 Jun 2021 08:00    RADIOLOGY:    EXAM:  CT ABDOMEN AND PELVIS            PROCEDURE DATE:  06/24/2021            INTERPRETATION:  CLINICAL STATEMENT: PEG tube and nephrostomy tube displacement    TECHNIQUE: Contiguous axial CT images were obtained from the lower chest to the pubic symphysis without intravenous contrast.  Oral contrast was not administered.  Reformatted images in the coronal and sagittal planes were acquired. Suboptimal positioning due to patient contraction.    COMPARISON CT: June 17, 2021    OTHER STUDIES USEDFOR CORRELATION: None.      FINDINGS:    LOWER CHEST: New small bilateral pleural effusions and increased bibasilar atelectasis.    HEPATOBILIARY: Unremarkable.    SPLEEN: Unremarkable.    PANCREAS: Unremarkable.    ADRENAL GLANDS: Unremarkable.    KIDNEYS: Interval placement of a right-sided percutaneous nephrostomy tube with pigtail in the renal pelvis/UPJ. 2 right renal pelvic calculi noted, measuring 0.6 x 0.5 x 0.6 cm and 0.5 x 0.4 x 0.5 cm (5/172 and 158). Additional punctate nonobstructingbilateral intrarenal calculi. Improved now mild right hydroureteronephrosis.    ABDOMINOPELVIC NODES: Unchanged.    PELVIC ORGANS: New hyperdense material seen within the urinary bladder, possibly dilute contrast from recent intervention. Stable indwelling suprapubic catheter.    PERITONEUM/MESENTERY/BOWEL: Gastrostomy tube balloon terminates within the gastric lumen. No bowel obstruction, ascites or pneumoperitoneum.    BONES/SOFT TISSUES: Stable visualized osseous structures and soft tissues.    OTHER: Unchanged.    IMPRESSION:  1.  Since June 17, 2021, interval placement of a right-sided percutaneous nephrostomy tube, appropriately positioned with decreased now mild hydroureteronephrosis; 2 right renal pelvic/UPJ calculi above.    2.  Appropriately positioned gastrostomy tube.    3.  New small bilateral pleural effusions and increased bibasilar atelectasis.    4.  Additional stable findings as above.              JULIO CASTRO MD; Attending Radiologist  This document has been electronically signed. Jun 24 2021  2:25PM

## 2021-06-25 LAB
ALBUMIN SERPL ELPH-MCNC: 3.2 G/DL — LOW (ref 3.5–5.2)
ALP SERPL-CCNC: 76 U/L — SIGNIFICANT CHANGE UP (ref 30–115)
ALT FLD-CCNC: 21 U/L — SIGNIFICANT CHANGE UP (ref 0–41)
ANION GAP SERPL CALC-SCNC: 8 MMOL/L — SIGNIFICANT CHANGE UP (ref 7–14)
AST SERPL-CCNC: 20 U/L — SIGNIFICANT CHANGE UP (ref 0–41)
BASOPHILS # BLD AUTO: 0.01 K/UL — SIGNIFICANT CHANGE UP (ref 0–0.2)
BASOPHILS NFR BLD AUTO: 0.1 % — SIGNIFICANT CHANGE UP (ref 0–1)
BILIRUB SERPL-MCNC: 0.4 MG/DL — SIGNIFICANT CHANGE UP (ref 0.2–1.2)
BUN SERPL-MCNC: 25 MG/DL — HIGH (ref 10–20)
CALCIUM SERPL-MCNC: 8.6 MG/DL — SIGNIFICANT CHANGE UP (ref 8.5–10.1)
CHLORIDE SERPL-SCNC: 95 MMOL/L — LOW (ref 98–110)
CO2 SERPL-SCNC: 34 MMOL/L — HIGH (ref 17–32)
CREAT SERPL-MCNC: 0.6 MG/DL — LOW (ref 0.7–1.5)
EOSINOPHIL # BLD AUTO: 0.7 K/UL — SIGNIFICANT CHANGE UP (ref 0–0.7)
EOSINOPHIL NFR BLD AUTO: 7.5 % — SIGNIFICANT CHANGE UP (ref 0–8)
GLUCOSE SERPL-MCNC: 88 MG/DL — SIGNIFICANT CHANGE UP (ref 70–99)
HCT VFR BLD CALC: 36.2 % — LOW (ref 42–52)
HGB BLD-MCNC: 12.2 G/DL — LOW (ref 14–18)
IMM GRANULOCYTES NFR BLD AUTO: 1.5 % — HIGH (ref 0.1–0.3)
LYMPHOCYTES # BLD AUTO: 1.16 K/UL — LOW (ref 1.2–3.4)
LYMPHOCYTES # BLD AUTO: 12.4 % — LOW (ref 20.5–51.1)
MAGNESIUM SERPL-MCNC: 1.5 MG/DL — LOW (ref 1.8–2.4)
MCHC RBC-ENTMCNC: 30.9 PG — SIGNIFICANT CHANGE UP (ref 27–31)
MCHC RBC-ENTMCNC: 33.7 G/DL — SIGNIFICANT CHANGE UP (ref 32–37)
MCV RBC AUTO: 91.6 FL — SIGNIFICANT CHANGE UP (ref 80–94)
MONOCYTES # BLD AUTO: 0.93 K/UL — HIGH (ref 0.1–0.6)
MONOCYTES NFR BLD AUTO: 9.9 % — HIGH (ref 1.7–9.3)
NEUTROPHILS # BLD AUTO: 6.43 K/UL — SIGNIFICANT CHANGE UP (ref 1.4–6.5)
NEUTROPHILS NFR BLD AUTO: 68.6 % — SIGNIFICANT CHANGE UP (ref 42.2–75.2)
NRBC # BLD: 0 /100 WBCS — SIGNIFICANT CHANGE UP (ref 0–0)
PLATELET # BLD AUTO: 217 K/UL — SIGNIFICANT CHANGE UP (ref 130–400)
POTASSIUM SERPL-MCNC: 3.4 MMOL/L — LOW (ref 3.5–5)
POTASSIUM SERPL-SCNC: 3.4 MMOL/L — LOW (ref 3.5–5)
PROT SERPL-MCNC: 5.7 G/DL — LOW (ref 6–8)
RBC # BLD: 3.95 M/UL — LOW (ref 4.7–6.1)
RBC # FLD: 13.1 % — SIGNIFICANT CHANGE UP (ref 11.5–14.5)
SODIUM SERPL-SCNC: 137 MMOL/L — SIGNIFICANT CHANGE UP (ref 135–146)
WBC # BLD: 9.37 K/UL — SIGNIFICANT CHANGE UP (ref 4.8–10.8)
WBC # FLD AUTO: 9.37 K/UL — SIGNIFICANT CHANGE UP (ref 4.8–10.8)

## 2021-06-25 PROCEDURE — 71045 X-RAY EXAM CHEST 1 VIEW: CPT | Mod: 26

## 2021-06-25 PROCEDURE — 99232 SBSQ HOSP IP/OBS MODERATE 35: CPT

## 2021-06-25 RX ORDER — MAGNESIUM SULFATE 500 MG/ML
2 VIAL (ML) INJECTION ONCE
Refills: 0 | Status: COMPLETED | OUTPATIENT
Start: 2021-06-25 | End: 2021-06-25

## 2021-06-25 RX ORDER — POTASSIUM CHLORIDE 20 MEQ
20 PACKET (EA) ORAL ONCE
Refills: 0 | Status: COMPLETED | OUTPATIENT
Start: 2021-06-25 | End: 2021-06-25

## 2021-06-25 RX ORDER — SODIUM CHLORIDE 9 MG/ML
1000 INJECTION, SOLUTION INTRAVENOUS
Refills: 0 | Status: DISCONTINUED | OUTPATIENT
Start: 2021-06-25 | End: 2021-06-26

## 2021-06-25 RX ORDER — PHENOBARBITAL 60 MG
60 TABLET ORAL DAILY
Refills: 0 | Status: DISCONTINUED | OUTPATIENT
Start: 2021-06-25 | End: 2021-06-29

## 2021-06-25 RX ADMIN — Medication 50 MILLIEQUIVALENT(S): at 09:21

## 2021-06-25 RX ADMIN — NYSTATIN CREAM 1 APPLICATION(S): 100000 CREAM TOPICAL at 17:18

## 2021-06-25 RX ADMIN — POLYMYXIN B SULFATE 500 UNIT(S): 500000 INJECTION, POWDER, LYOPHILIZED, FOR SOLUTION INTRAMUSCULAR; INTRATHECAL; INTRAVENOUS; OPHTHALMIC at 05:04

## 2021-06-25 RX ADMIN — MEROPENEM 200 MILLIGRAM(S): 1 INJECTION INTRAVENOUS at 15:17

## 2021-06-25 RX ADMIN — NYSTATIN CREAM 1 APPLICATION(S): 100000 CREAM TOPICAL at 05:04

## 2021-06-25 RX ADMIN — POLYMYXIN B SULFATE 500 UNIT(S): 500000 INJECTION, POWDER, LYOPHILIZED, FOR SOLUTION INTRAMUSCULAR; INTRATHECAL; INTRAVENOUS; OPHTHALMIC at 17:18

## 2021-06-25 RX ADMIN — MEROPENEM 200 MILLIGRAM(S): 1 INJECTION INTRAVENOUS at 05:04

## 2021-06-25 RX ADMIN — Medication 16.67 GRAM(S): at 09:21

## 2021-06-25 RX ADMIN — Medication 60 MILLIGRAM(S): at 12:29

## 2021-06-25 RX ADMIN — CHLORHEXIDINE GLUCONATE 1 APPLICATION(S): 213 SOLUTION TOPICAL at 05:04

## 2021-06-25 RX ADMIN — MEROPENEM 200 MILLIGRAM(S): 1 INJECTION INTRAVENOUS at 22:55

## 2021-06-25 NOTE — PROGRESS NOTE ADULT - SUBJECTIVE AND OBJECTIVE BOX
TISH SHAIKH 65y Male  MRN#: [id number]   Hospital Day: 8d    SUBJECTIVE  Patient is a 65y old Male who presents with a chief complaint of Nephrolithiasis (2021 08:03)  Currently admitted to medicine with the primary diagnosis of Kidney stone      INTERVAL HPI AND OVERNIGHT EVENTS:  Patient was examined and seen at bedside. This morning he is resting comfortably in bed and reports no issues or overnight events. No acute events overnight as per night team.     REVIEW OF SYMPTOMS:  CONSTITUTIONAL: Denies fevers, chills  RESPIRATORY: Denies shortness of breath, cough, sputum  CARDIOVASCULAR: Denies chest pain   GASTROINTESTINAL: Denies abdominal pain, bloating  GENITOURINARY: Denies dysuria (pt has alatorre), denies flank pain  NEUROLOGICAL: Denies numbness or weakness  SKIN: admits to some pruritus on head       OBJECTIVE  PAST MEDICAL & SURGICAL HISTORY  BPH (benign prostatic hyperplasia)    Cerebral palsy    Seizure  last seizure &gt;10 years ago    Osteoporosis    Spastic quadriplegia    Urinary calculi    Urinary retention    Asthma    S/P percutaneous endoscopic gastrostomy (PEG) tube placement    H/O cystoscopy    Suprapubic catheter      ALLERGIES:  No Known Allergies    MEDICATIONS:  STANDING MEDICATIONS  chlorhexidine 4% Liquid 1 Application(s) Topical <User Schedule>  furosemide    Tablet 40 milliGRAM(s) Oral daily  PHYSICAL EXAM:  Constitutional: pt is comfortable lying/sitting in bed; no acute distress; well-groomed  HEENT: Full ROM in bilateral eyes; pupils symmetrical and equal in size; neck supple  Respiratory: (+) sounds in all lung fields; no crackles or wheezes appreciated  Cardiovascular: S1 S2 regular rate and rhythm; no murmurs or gallops appreciated  Gastrointestinal: (+) bowel sounds in all quadrants; abdomen is non-distended, non-tender to superficial and deep palpation  Genitourinary: Alatorre Site is clean  Extremities: extremities are non-edematous  Vascular: Warm and Well perfused UE and LE; no mottling or dusky skin   Neurological: AxO x3 to self, place and year  Skin: no raches or ulcerations noted; no scaling present  Lymph Nodes: No palpable lymph nodes in neck        meropenem  IVPB 2000 milliGRAM(s) IV Intermittent every 8 hours  nystatin Powder 1 Application(s) Topical two times a day  PHENobarbital Injectable 60 milliGRAM(s) IV Push daily  polymyxin B IVPB [id number] Unit(s) IV Intermittent every 12 hours  predniSONE   Tablet 20 milliGRAM(s) Oral daily  tamsulosin 0.4 milliGRAM(s) Oral at bedtime    PRN MEDICATIONS  acetaminophen  Suppository .. 650 milliGRAM(s) Rectal every 6 hours PRN  ALBUTerol    90 MICROgram(s) HFA Inhaler 2 Puff(s) Inhalation every 6 hours PRN  morphine  - Injectable 4 milliGRAM(s) IV Push every 6 hours PRN      VITAL SIGNS: Last 24 Hours  T(C): 36.1 (2021 07:54), Max: 37.4 (2021 15:53)  T(F): 97 (2021 07:54), Max: 99.4 (2021 15:53)  HR: 87 (2021 07:54) (77 - 91)  BP: 94/54 (2021 07:54) (92/54 - 98/55)  BP(mean): 69 (2021 07:54) (69 - 73)  RR: 20 (2021 07:54) (20 - 20)  SpO2: 96% (2021 05:05) (96% - 97%)    LABS:                        12.2   9.37  )-----------( 217      ( 2021 04:30 )             36.2     06-25    137  |  95<L>  |  25<H>  ----------------------------<  88  3.4<L>   |  34<H>  |  0.6<L>    Ca    8.6      2021 04:30  Mg     1.5     06-25    TPro  5.7<L>  /  Alb  3.2<L>  /  TBili  0.4  /  DBili  x   /  AST  20  /  ALT  21  /  AlkPhos  76  06-25      Urinalysis Basic - ( 2021 15:20 )    Color: Light Brown / Appearance: Slightly Turbid / S.014 / pH: x  Gluc: x / Ketone: Small  / Bili: Negative / Urobili: <2 mg/dL   Blood: x / Protein: 30 mg/dL / Nitrite: Negative   Leuk Esterase: Negative / RBC: >720 /HPF / WBC 10 /HPF   Sq Epi: x / Non Sq Epi: 6 /HPF / Bacteria: Negative    Culture - Blood (collected 2021 05:52)  Source: .Blood None  Preliminary Report (2021 19:01):    No growth to date.    Culture - Urine (collected 2021 16:00)  Source: Kidney Nephrostomy - Right  Final Report (2021 17:15):    No growth at 48 hours          RADIOLOGY:         TISH SHAIKH 65y Male  MRN#: [id number]   Hospital Day: 8d    SUBJECTIVE  Patient is a 65y old Male who presents with a chief complaint of Nephrolithiasis (2021 08:03)  Currently admitted to medicine with the primary diagnosis of Kidney stone      INTERVAL HPI AND OVERNIGHT EVENTS:  Patient was examined and seen at bedside. This morning he is resting comfortably in bed and reports no issues or overnight events. No acute events overnight as per night team.     REVIEW OF SYMPTOMS:  CONSTITUTIONAL: Denies fevers, chills  RESPIRATORY: Denies shortness of breath, cough, sputum  CARDIOVASCULAR: Denies chest pain   GASTROINTESTINAL: Denies abdominal pain, bloating  GENITOURINARY: Denies dysuria (pt has alatorre), denies flank pain  NEUROLOGICAL: Denies numbness or weakness  SKIN: admits to some pruritus on head       OBJECTIVE  PAST MEDICAL & SURGICAL HISTORY  BPH (benign prostatic hyperplasia)    Cerebral palsy    Seizure  last seizure &gt;10 years ago    Osteoporosis    Spastic quadriplegia    Urinary calculi    Urinary retention    Asthma    S/P percutaneous endoscopic gastrostomy (PEG) tube placement    H/O cystoscopy    Suprapubic catheter      ALLERGIES:  No Known Allergies    MEDICATIONS:  STANDING MEDICATIONS  chlorhexidine 4% Liquid 1 Application(s) Topical <User Schedule>  furosemide    Tablet 40 milliGRAM(s) Oral daily    PHYSICAL EXAM:  Constitutional: pt is comfortable lying/sitting in bed; no acute distress; well-groomed  HEENT: Full ROM in bilateral eyes; pupils symmetrical and equal in size; neck supple  Respiratory: (+) sounds in all lung fields; no crackles or wheezes appreciated  Cardiovascular: S1 S2 regular rate and rhythm; no murmurs or gallops appreciated  Gastrointestinal: (+) bowel sounds in all quadrants; abdomen is non-distended, non-tender to superficial and deep palpation  Genitourinary: Alatorre Site is clean; Nephro tube and Alatorre still contain hematuria, but improved from yesterday   Extremities: extremities are non-edematous  Vascular: Warm and Well perfused UE and LE; no mottling or dusky skin   Neurological: extremities contracted  Skin: no rashes or ulcerations noted; no scaling present  Lymph Nodes: No palpable lymph nodes in neck        meropenem  IVPB 2000 milliGRAM(s) IV Intermittent every 8 hours  nystatin Powder 1 Application(s) Topical two times a day  PHENobarbital Injectable 60 milliGRAM(s) IV Push daily  polymyxin B IVPB [id number] Unit(s) IV Intermittent every 12 hours  predniSONE   Tablet 20 milliGRAM(s) Oral daily  tamsulosin 0.4 milliGRAM(s) Oral at bedtime    PRN MEDICATIONS  acetaminophen  Suppository .. 650 milliGRAM(s) Rectal every 6 hours PRN  ALBUTerol    90 MICROgram(s) HFA Inhaler 2 Puff(s) Inhalation every 6 hours PRN  morphine  - Injectable 4 milliGRAM(s) IV Push every 6 hours PRN      VITAL SIGNS: Last 24 Hours  T(C): 36.1 (2021 07:54), Max: 37.4 (2021 15:53)  T(F): 97 (2021 07:54), Max: 99.4 (2021 15:53)  HR: 87 (2021 07:54) (77 - 91)  BP: 94/54 (2021 07:54) (92/54 - 98/55)  BP(mean): 69 (2021 07:54) (69 - 73)  RR: 20 (2021 07:54) (20 - 20)  SpO2: 96% (2021 05:05) (96% - 97%)    LABS:                        12.2   9.37  )-----------( 217      ( 2021 04:30 )             36.2     06-25    137  |  95<L>  |  25<H>  ----------------------------<  88  3.4<L>   |  34<H>  |  0.6<L>    Ca    8.6      2021 04:30  Mg     1.5     06-25    TPro  5.7<L>  /  Alb  3.2<L>  /  TBili  0.4  /  DBili  x   /  AST  20  /  ALT  21  /  AlkPhos  76  06-25      Urinalysis Basic - ( 2021 15:20 )    Color: Light Brown / Appearance: Slightly Turbid / S.014 / pH: x  Gluc: x / Ketone: Small  / Bili: Negative / Urobili: <2 mg/dL   Blood: x / Protein: 30 mg/dL / Nitrite: Negative   Leuk Esterase: Negative / RBC: >720 /HPF / WBC 10 /HPF   Sq Epi: x / Non Sq Epi: 6 /HPF / Bacteria: Negative    Culture - Blood (collected 2021 05:52)  Source: .Blood None  Preliminary Report (2021 19:01):    No growth to date.    Culture - Urine (collected 2021 16:00)  Source: Kidney Nephrostomy - Right  Final Report (2021 17:15):    No growth at 48 hours          RADIOLOGY:    < from: Xray Chest 1 View- PORTABLE-Routine (Xray Chest 1 View- PORTABLE-Routine in AM.) (21 @ 06:15) >    Impression:    Interval improvement of bilateral opacities.      < end of copied text >

## 2021-06-25 NOTE — PROGRESS NOTE ADULT - ASSESSMENT
ASSESSMENT  64 years old Male with PMHx of Cerebral Palsy, Seizure disorder, spastic paraplegia s/p PEG, BPH, Hx of Nephrolithiasis s/p pcnl x 2 with known incompetent bladder neck s/p SPT presents to ED with Right sided flank pain. Patient had Right-sided PCNL 5/20.    IMPRESSION  #Septic shock requiring pressors secondary to Polymicrobial bacteremia with Pseudomonas and VRE secondary to pyelonephritis secondary to obstructing stone    s/p SPT exchange and R PCN    6/22 BCX NGTD     6/21 BCX NGTD     6/19 BCX NGTD     R kidney CX   Numerous Pseudomonas aeruginosa (Carbapenem Resistant) Multiple Morphological Strains    Numerous Proteus mirabilis ESBL    Numerous Staphylococcus aureus- MSSA    Numerous Actinotignum schaali group "Susceptibilities not performed"    6/18 BCX Pseudomonas, Enterococcus faecalis VRE    UA  WBC 10     CTAP 1.  Obstructing proximal right ureteral stone with moderate hydroureteronephrosis.  2.  Bladder wall thickening. Correlate with urinalysis.  #CT Bibasilar atelectasis.  Creatinine, Serum: 0.8 (06-19-21 @ 07:11)    Weight (kg): 57.4 (06-18-21 @ 05:00)    RECOMMENDATIONS  - Meropenem 2g q8h IV  - Polymyxin 700,000 units q12h IV. monitor Cr & lytes 6/22-  - Appreciate Urology consult- s/p SPT exchange 6/18  - Appreciate IR consult: s/p Percutaneous right nephrostomy  - Cleared from ID standpoint for any procedures      If any questions, please call or send a message on [employer]  Please continue to update ID with any pertinent new laboratory or radiographic findings  Spectra [id number]

## 2021-06-25 NOTE — PROGRESS NOTE ADULT - ASSESSMENT
IMPRESSION:    Sepsis present on admission better  Septic shock, improving  Obstructive pyelonephritis s/p Right PCN ?  dislodged/ hematuria ct reviewed  Pseudomonas & E. faecalis VRE bacteremia  Chronic suprapubic alatorre  Seizure yesterday? HO Seizures  HO Cerebral palsy chronically contracted      PLAN:    CNS:  Avoid CNS depressants.     HEENT:  Oral care.  Aspiration precautions.    PULMONARY:  HOB @ 45 degrees. CXR today.  Wean O2 as tolerated, Goal 88 to 94%, NC, taper prednisome    CARDIOVASCULAR:  lo lasix    GI: GI prophylaxis. po    RENAL: FU lytes.  Correct as needed.  Monitor UO.  Monitor nephrostomy drain.    INFECTIOUS DISEASE:  ABX PER ID    HEMATOLOGICAL:  DVT prophylaxis.     ENDOCRINE:  Follow up FS.  Insulin protocol if needed.    MUSCULOSKELETAL: bed rest    CODE STATUS: FULL CODE    DISPOSITION: SDU

## 2021-06-25 NOTE — PROGRESS NOTE ADULT - ASSESSMENT
HOSPITAL COURSE:    ASSESSMENT & PLAN:  Patient is a     PAST MEDICAL & SURGICAL HISTORY:  BPH (benign prostatic hyperplasia)    Cerebral palsy    Seizure  last seizure &gt;10 years ago    Osteoporosis    Spastic quadriplegia    Urinary calculi    Urinary retention    Asthma    S/P percutaneous endoscopic gastrostomy (PEG) tube placement    H/O cystoscopy    Suprapubic catheter        #Misc  - DVT Prophylaxis:  - GI Prophylaxis:  - Diet:  - Activity:  - IV Fluids:  - Code Status:  - Dispo:  HOSPITAL COURSE:    65y M with PMHx of cerebral palsy, Seizure disorder, spastic paraplegia s/p PEG, BPH, Hx of Nephrolithiasis s/p pcnl x 2 (last oen in 5/20/21) with known incompetent bladder neck s/p Suprapubic Tube Placement admitted on 6/17/21 with sepsis 2/2 obstructive nephrolithiasis. CT revealed R proximal ureteral stone with moderate hydroureteronephrosis. On 6/18, pt decompensated to uroseptic shock (T 103F, BP 75/45). Emergent R Percutaneous Nephrolithotomy performed by IR on 6/18/2021, Levophed started. BP improved following procedure and LR bolus. Levophed was discontinued on 6/20, and patient has been hemodynamically stable since. Pt was weaned off HiFlow to nasal canula (6/22). Awaiting possible PCNL procedure.       ASSESSMENT & PLAN:    # Septic shock 2/2 bacteremia from pyelonephritis + obstructive nephrolithiasis - resolving   # obstructive urosepsis - resolving  - s/p R PCN on 6/18  - Blood Cultures 6/18 (+) for carbap. resistant Pseudomonas and e.Fecalis; Blood Culx 6/21: NGTD  - Urine Culx 6/18 (+) pseudomonas, proteus, ESBL, s aureus, actinotignum   - as per ID reccs: c/w Meropenem and Polymyxin; as of 6/24, cleared for any procedures  - f/u UA to correlate with bladder wall thickening seen on 6/17 CT   - when medically optimized, IR consult to convert nephrostomy to a nephro-ureteral stent and then eventual PCNL     #Gross Hematuria   # Bleeding from Nephrostomy tube   - CT 6/24: appropriately positioned nephrostomy tube with decreased now mild hydroureteronephrosis  - Hb Stable: 13.3 (6/24)    #acute hypoxic respiratory failure possibly secondary to acute seizure   - CXR 6/24: unchanged LLL opacity   - TTE: EF 62%, mild aortic stenosis  - c/w lasix 40mg IV daily   - stable on 4L NC (6/23)    #Seizure disorder  #Hx of cerebral palsy/spastic paraplegia s/p PEG tube   - was a rapid response in IR for seizure on 6/18  - 6/23: IV Phenobarb changed to -->64.8mg PO phenobarbital via peg qd  - Pheno level 6/23: 8.4, subtherapeutic, however patient has been maintained on this dose at NH     #BPH: c/w tamsulosin   #Asthma:  c/w inhalers    DVT ppx: lovenox  GI ppx: none  Diet: PEG feeds  Code Status: FULL CODE  Dispo: from nursing home; f/u Nasal cannula trial,     Followup:   - PCNL procedure tentatively scheduled for 6/29; contact IR  - COVID test prior to procedure     #Misc  - DVT Prophylaxis:  - GI Prophylaxis:  - Diet: Tube Feeds  - Code Status: Full Code   HOSPITAL COURSE:    65y M with PMHx of cerebral palsy, Seizure disorder, spastic paraplegia s/p PEG, BPH, Hx of Nephrolithiasis s/p pcnl x 2 (last oen in 5/20/21) with known incompetent bladder neck s/p Suprapubic Tube Placement admitted on 6/17/21 with sepsis 2/2 obstructive nephrolithiasis. CT revealed R proximal ureteral stone with moderate hydroureteronephrosis. On 6/18, pt decompensated to uroseptic shock (T 103F, BP 75/45). Emergent R Percutaneous Nephrolithotomy performed by IR on 6/18/2021, Levophed started. BP improved following procedure and LR bolus. Levophed was discontinued on 6/20, and patient has been hemodynamically stable since. Pt was weaned off HiFlow to nasal canula (6/22).     As of 6/24, pt was cleared by ID for any procedures.   On 6/25, IR was contacted for conversion of nephrostomy to nephro ureteral stent, as well assessment of PEG tube which is clogged.       ASSESSMENT & PLAN:    # Septic shock 2/2 bacteremia from pyelonephritis + obstructive nephrolithiasis - resolving   # obstructive urosepsis - resolving  - s/p R PCN on 6/18  - Blood Cultures 6/18 (+) for carbap. resistant Pseudomonas and e.Fecalis; Blood Culx 6/21: NGTD  - Urine Culx 6/18 (+) pseudomonas, proteus, ESBL, s aureus, actinotignum   - as per ID reccs: c/w Meropenem and Polymyxin; as of 6/24, cleared for any procedures  - f/u UA to correlate with bladder wall thickening seen on 6/17 CT   - IR contacted on 6/25 to convert nephrostomy tube to nephroureteral stent and to assess G-Tube  - PCNL procedure by Uro tentatively scheduled for 6/29  - f/u cardio clearance    #Gross Hematuria   # Bleeding from Nephrostomy tube   - CT 6/24: appropriately positioned nephrostomy tube with decreased now mild hydroureteronephrosis  - Hb Stable: 12.2 (6/25)    #acute hypoxic respiratory failure possibly secondary to acute seizure   - CXR 6/24: unchanged LLL opacity   - TTE: EF 62%, mild aortic stenosis  - c/w lasix 40mg IV daily   - stable on 4L NC (6/25)    #Seizure disorder  #Hx of cerebral palsy/spastic paraplegia s/p PEG tube   - was a rapid response in IR for seizure on 6/18  - 6/23: IV Phenobarb changed to -->64.8mg PO phenobarbital via peg qd  - 6/25: PO phenobarbital changed to --> 60mg phenobarbital IV because peg tube clogged      #BPH: c/w tamsulosin   #Asthma:  c/w inhalers    DVT ppx: lovenox  GI ppx: none  Diet: PEG feeds  Code Status: FULL CODE  Dispo: from nursing home; f/u Nasal cannula trial,     Followup:   - PCNL procedure tentatively scheduled for 6/29;  - IR contacted on 6/25 for nephroureteral stent; f/u with procedure  - f/u with IR who will assess why peg tube will not work   - COVID test prior to procedure     #Misc  - DVT Prophylaxis:  - GI Prophylaxis:  - Diet: Tube Feeds  - Code Status: Full Code   HOSPITAL COURSE:    65y M with PMHx of cerebral palsy, Seizure disorder, spastic paraplegia s/p PEG, BPH, Hx of Nephrolithiasis s/p pcnl x 2 (last oen in 5/20/21) with known incompetent bladder neck s/p Suprapubic Tube Placement admitted on 6/17/21 with sepsis 2/2 obstructive nephrolithiasis. CT revealed R proximal ureteral stone with moderate hydroureteronephrosis. On 6/18, pt decompensated to uroseptic shock (T 103F, BP 75/45). Emergent R Percutaneous Nephrolithotomy performed by IR on 6/18/2021, Levophed started. BP improved following procedure and LR bolus. Levophed was discontinued on 6/20, and patient has been hemodynamically stable since. Pt was weaned off HiFlow to nasal canula (6/22).     As of 6/24, pt was cleared by ID for any procedures.   On 6/25, IR was contacted for conversion of nephrostomy to nephro ureteral stent, as well assessment of PEG tube which is clogged.       ASSESSMENT & PLAN:    # Septic shock 2/2 bacteremia from pyelonephritis + obstructive nephrolithiasis - resolving   # obstructive urosepsis - resolving  - s/p R PCN on 6/18  - Blood Cultures 6/18 (+) for carbap. resistant Pseudomonas and e.Fecalis; Blood Culx 6/21: NGTD  - Urine Culx 6/18 (+) pseudomonas, proteus, ESBL, s aureus, actinotignum   - as per ID reccs: c/w Meropenem and Polymyxin; as of 6/24, cleared for any procedures  - f/u UA to correlate with bladder wall thickening seen on 6/17 CT   - IR contacted on 6/25 to convert nephrostomy tube to nephroureteral stent and to assess G-Tube  - PCNL procedure by Uro tentatively scheduled for 6/29  - f/u cardio clearance    #Gross Hematuria   # Bleeding from Nephrostomy tube   - CT 6/24: appropriately positioned nephrostomy tube with decreased now mild hydroureteronephrosis  - Hb Stable: 12.2 (6/25)    #acute hypoxic respiratory failure possibly secondary to acute seizure   - CXR 6/24: unchanged LLL opacity   - TTE: EF 62%, mild aortic stenosis  - c/w lasix 40mg IV daily   - stable on 4L NC (6/25)    #Seizure disorder  #Hx of cerebral palsy/spastic paraplegia s/p PEG tube   - was a rapid response in IR for seizure on 6/18  - 6/23: IV Phenobarb changed to -->64.8mg PO phenobarbital via peg qd  - 6/25: PO phenobarbital changed to --> 60mg phenobarbital IV because peg tube clogged      #BPH: c/w tamsulosin   #Asthma:  c/w inhalers    DVT ppx: lovenox  GI ppx: none  Diet: PEG feeds  Code Status: FULL CODE  Dispo: from nursing home; f/u Nasal cannula trial,     Followup:   - PCNL procedure tentatively scheduled for 6/29;  - IR contacted on 6/25 for nephroureteral stent; f/u with procedure  - f/u with IR who will assess why peg tube will not work   - COVID test prior to procedure   - Cardio Clearance prior to PCNL procedure     #Misc  - DVT Prophylaxis:  - GI Prophylaxis:  - Diet: Tube Feeds  - Code Status: Full Code

## 2021-06-25 NOTE — PROGRESS NOTE ADULT - SUBJECTIVE AND OBJECTIVE BOX
OVERNIGHT EVENTS: events noted, sp CT AP, hematuria improved    Vital Signs Last 24 Hrs  T(C): 36.1 (2021 07:54), Max: 37.4 (2021 15:53)  T(F): 97 (2021 07:54), Max: 99.4 (2021 15:53)  HR: 87 (2021 07:54) (73 - 91)  BP: 94/54 (2021 07:54) (85/51 - 98/55)  BP(mean): 69 (2021 07:54) (62 - 73)  RR: 20 (2021 07:54) (18 - 20)  SpO2: 96% (2021 05:05) (96% - 97%)    PHYSICAL EXAMINATION:    GENERAL: ill looking    HEENT: Head is normocephalic and atraumatic.    NECK: Supple.    LUNGS: dec bs both bases    HEART: MARCIAL 3/6    ABDOMEN: Soft, nontender, and nondistended.      EXTREMITIES: Without any cyanosis, clubbing, rash, lesions or edema.    NEUROLOGIC: NO FOCAL    SKIN: No ulceration or induration present.      LABS:                        12.2   9.37  )-----------( 217      ( 2021 04:30 )             36.2     06-25    137  |  95<L>  |  25<H>  ----------------------------<  88  3.4<L>   |  34<H>  |  0.6<L>    Ca    8.6      2021 04:30  Phos  3.2     06-23  Mg     1.5     06-25    TPro  5.7<L>  /  Alb  3.2<L>  /  TBili  0.4  /  DBili  x   /  AST  20  /  ALT  21  /  AlkPhos  76  06-25      Urinalysis Basic - ( 2021 15:20 )    Color: Light Brown / Appearance: Slightly Turbid / S.014 / pH: x  Gluc: x / Ketone: Small  / Bili: Negative / Urobili: <2 mg/dL   Blood: x / Protein: 30 mg/dL / Nitrite: Negative   Leuk Esterase: Negative / RBC: >720 /HPF / WBC 10 /HPF   Sq Epi: x / Non Sq Epi: 6 /HPF / Bacteria: Negative                        21 @ 07:01  -  21 @ 07:00  --------------------------------------------------------  IN: 0 mL / OUT: 2 mL / NET: -2 mL        MICROBIOLOGY:  Culture Results:   No growth to date. ( @ 05:52)  Culture Results:   No growth at 48 hours ( @ 16:00)  Culture Results:   No growth to date. ( @ 06:22)      MEDICATIONS  (STANDING):  chlorhexidine 4% Liquid 1 Application(s) Topical <User Schedule>  furosemide    Tablet 40 milliGRAM(s) Oral daily  meropenem  IVPB 2000 milliGRAM(s) IV Intermittent every 8 hours  nystatin Powder 1 Application(s) Topical two times a day  PHENobarbital 64.8 milliGRAM(s) Oral daily  polymyxin B IVPB [id number] Unit(s) IV Intermittent every 12 hours  predniSONE   Tablet 20 milliGRAM(s) Oral daily  tamsulosin 0.4 milliGRAM(s) Oral at bedtime    MEDICATIONS  (PRN):  acetaminophen  Suppository .. 650 milliGRAM(s) Rectal every 6 hours PRN Temp greater or equal to 38C (100.4F)  ALBUTerol    90 MICROgram(s) HFA Inhaler 2 Puff(s) Inhalation every 6 hours PRN Bronchospasm  morphine  - Injectable 4 milliGRAM(s) IV Push every 6 hours PRN Moderate Pain (4 - 6)      RADIOLOGY & ADDITIONAL STUDIES:

## 2021-06-25 NOTE — PROGRESS NOTE ADULT - SUBJECTIVE AND OBJECTIVE BOX
INTERVENTIONAL RADIOLOGY CONSULT:     Procedure Requested: R PCN to PCNU    HPI:  64 years old Male with PMHx of Cerebral Palsy, Seizure disorder, spastic paraplegia s/p PEG, BPH, Hx of Nephrolithiasis s/p pcnl x 2 with known incompetent bladder neck s/p SPT presents to ED with Right sided flank pain. Patient seen and examined at bedside, History obtained from group home chart. Patient reports feeling flank pain in the afternoon, radiating to RLQ, 10/10 with associated nausea/vomiting. Patient had Right-sided PCNL 5/20. Denies any fever, chills, SOB, CP, dysuria, hematuria. SPT in place draining cloudy yellow urine.     In ED, vitals were WNL, Labs significant for elevated lactate 2.2, UA + for bacteruria, LKE +.  CT A/P shows Delayed right nephrogram. Moderate right hydroureteronephrosis secondary to an obstructing right proximal ureteral stone. Stone size is difficult to measure due to extensive artifact. This stone appears to measure at least 6 mm craniocaudally (max ). He also has a right upper pole stone. Patient received 8mg of Morphine, IVF, Zofran, toradol and cefepime in ED patient reports pain is the same 10/10. To be admitted under medicine.  (18 Jun 2021 01:02)      PAST MEDICAL & SURGICAL HISTORY:  BPH (benign prostatic hyperplasia)  Cerebral palsy  Seizure  last seizure &gt;10 years ago  Osteoporosis  Spastic quadriplegia  Urinary calculi  Urinary retention  Asthma  S/P percutaneous endoscopic gastrostomy (PEG) tube placement  H/O cystoscopy  Suprapubic catheter        MEDICATIONS  (STANDING):  chlorhexidine 4% Liquid 1 Application(s) Topical <User Schedule>  furosemide    Tablet 40 milliGRAM(s) Oral daily  meropenem  IVPB 2000 milliGRAM(s) IV Intermittent every 8 hours  nystatin Powder 1 Application(s) Topical two times a day  PHENobarbital Injectable 60 milliGRAM(s) IV Push daily  polymyxin B IVPB [id number] Unit(s) IV Intermittent every 12 hours  predniSONE   Tablet 20 milliGRAM(s) Oral daily  tamsulosin 0.4 milliGRAM(s) Oral at bedtime    MEDICATIONS  (PRN):  acetaminophen  Suppository .. 650 milliGRAM(s) Rectal every 6 hours PRN Temp greater or equal to 38C (100.4F)  ALBUTerol    90 MICROgram(s) HFA Inhaler 2 Puff(s) Inhalation every 6 hours PRN Bronchospasm  morphine  - Injectable 4 milliGRAM(s) IV Push every 6 hours PRN Moderate Pain (4 - 6)      Allergies    No Known Allergies        FAMILY HISTORY:      Physical Exam:   Vital Signs Last 24 Hrs  T(C): 38 (25 Jun 2021 15:48), Max: 38 (25 Jun 2021 15:48)  T(F): 100.4 (25 Jun 2021 15:48), Max: 100.4 (25 Jun 2021 15:48)  HR: 92 (25 Jun 2021 15:48) (77 - 92)  BP: 100/57 (25 Jun 2021 15:48) (92/54 - 100/57)  BP(mean): 76 (25 Jun 2021 15:48) (69 - 76)  RR: 20 (25 Jun 2021 15:48) (18 - 20)  SpO2: 97% (25 Jun 2021 12:47) (96% - 97%)       Labs:                         12.2   9.37  )-----------( 217      ( 25 Jun 2021 04:30 )             36.2     06-25    137  |  95<L>  |  25<H>  ----------------------------<  88  3.4<L>   |  34<H>  |  0.6<L>    Ca    8.6      25 Jun 2021 04:30  Mg     1.5     06-25    TPro  5.7<L>  /  Alb  3.2<L>  /  TBili  0.4  /  DBili  x   /  AST  20  /  ALT  21  /  AlkPhos  76  06-25          Radiology & Additional Studies:     Radiology imaging reviewed.       ASSESSMENT/ PLAN:   64 years old Male with PMHx of Cerebral Palsy, Seizure disorder, spastic paraplegia s/p PEG, BPH, Hx of Nephrolithiasis Moderate right hydroureteronephrosis secondary to an obstructing right proximal ureteral stone s/p R PCN 6/18. Recently managed in the ICU for sepsis.  Requested by urology to convert nephrostomy to a nephro-ureteral catheter via mid pole calyx. Patient has PCNL scheduled tentatively for next Tuesday.  - Plan for exchange of R PCN to PCNU on Monday 6/28  - draw PT/PTT/INR prior to procedure  - keep NPO after midnight Sunday night  - Hold anticoagulation      Thank you for the courtesy of this consult, please call [phone] with any further questions.

## 2021-06-25 NOTE — PROGRESS NOTE ADULT - SUBJECTIVE AND OBJECTIVE BOX
Subjective:   Urology Progress Note:  65 year old male with a proximal R ureteral stone s/p R PCN placement, awaiting R PCNL when medically optimal.  Pt. seen and examined at bedside in NAD. T max 100.4 noted.    Case d/w IR resident (0191) Right PCNU placement most likely will be done next Monday.        ROS:    [ x ] A 10 Point Review of Systems was negative except where noted       acetaminophen  Suppository .. 650 milliGRAM(s) Rectal every 6 hours PRN  ALBUTerol    90 MICROgram(s) HFA Inhaler 2 Puff(s) Inhalation every 6 hours PRN  chlorhexidine 4% Liquid 1 Application(s) Topical <User Schedule>  furosemide    Tablet 40 milliGRAM(s) Oral daily  meropenem  IVPB 2000 milliGRAM(s) IV Intermittent every 8 hours  morphine  - Injectable 4 milliGRAM(s) IV Push every 6 hours PRN  nystatin Powder 1 Application(s) Topical two times a day  PHENobarbital Injectable 60 milliGRAM(s) IV Push daily  polymyxin B IVPB [id number] Unit(s) IV Intermittent every 12 hours  predniSONE   Tablet 20 milliGRAM(s) Oral daily  tamsulosin 0.4 milliGRAM(s) Oral at bedtime        Vital Signs Last 24 Hrs  T(C): 38 (25 Jun 2021 15:48), Max: 38 (25 Jun 2021 15:48)  T(F): 100.4 (25 Jun 2021 15:48), Max: 100.4 (25 Jun 2021 15:48)  HR: 92 (25 Jun 2021 15:48) (77 - 92)  BP: 100/57 (25 Jun 2021 15:48) (92/54 - 100/57)  BP(mean): 76 (25 Jun 2021 15:48) (69 - 76)  RR: 20 (25 Jun 2021 15:48) (18 - 20)  SpO2: 97% (25 Jun 2021 12:47) (96% - 97%)      PE  Constitutional: NAD, awake and alert  HEENT: AT, EOMI.   Back: No CVA tenderness B/L,  Right PCN in place drains yellow urine   Respiratory: No accessory respiratory muscle use  Abd: Soft, distended, mild  ttp + SPT in place drains yellow urine.   Extremities: contracted extremities   Psychiatric: Normal mood, normal affect  Skin: No rashes      06-24-21 @ 07:01  -  06-25-21 @ 07:00  --------------------------------------------------------  OUT: 1100 mL    06-25-21 @ 07:01  -  06-25-21 @ 19:38  --------------------------------------------------------  OUT: 300 mL        LABS:                        12.2   9.37  )-----------( 217      ( 25 Jun 2021 04:30 )             36.2     06-25    137  |  95<L>  |  25<H>  ----------------------------<  88  3.4<L>   |  34<H>  |  0.6<L>    Ca    8.6      25 Jun 2021 04:30  Mg     1.5     06-25    TPro  5.7<L>  /  Alb  3.2<L>  /  TBili  0.4  /  DBili  x   /  AST  20  /  ALT  21  /  AlkPhos  76  06-25      Urinalysis (06.23.21 @ 15:20)    Glucose Qualitative, Urine: Negative    Blood, Urine: Large    pH Urine: 6.5    Color: Light Brown    Urine Appearance: Slightly Turbid    Bilirubin: Negative    Ketone - Urine: Small    Specific Gravity: 1.014    Protein, Urine: 30 mg/dL    Urobilinogen: <2 mg/dL    Nitrite: Negative    Leukocyte Esterase Concentration: Negative    Culture - Urine (06.22.21 @ 16:00)    Specimen Source: Kidney Nephrostomy - Right    Culture Results:   No growth at 48 hours     Culture - Blood in AM (06.24.21 @ 08:00)    Specimen Source: .Blood Blood-Peripheral    Culture Results:   No growth to date.    Rad:   IMPRESSION:  1.  Since June 17, 2021, interval placement of a right-sided percutaneous nephrostomy tube, appropriately positioned with decreased now mild hydroureteronephrosis; 2 right renal pelvic/UPJ calculi above.    2.  Appropriately positioned gastrostomy tube.    3.  New small bilateral pleural effusions and increased bibasilar atelectasis.    4.  Additional stable findings as above.      JULIO CASTRO MD; Attending Radiologist  This document has been electronically signed. Jun 24 2021  2:25PM

## 2021-06-25 NOTE — PROGRESS NOTE ADULT - SUBJECTIVE AND OBJECTIVE BOX
TISH SHAIKH  65y, Male  Allergy: No Known Allergies      LOS  8d    CHIEF COMPLAINT: Nephrolithiasis (2021 11:48)      INTERVAL EVENTS/HPI  - No acute events overnight  - T(F): , Max: 99.4 (21 @ 15:53)  - Denies any worsening symptoms  - Tolerating medication  - WBC Count: 9.37 (21 @ 04:30)  WBC Count: 8.61 (21 @ 08:00)     - Creatinine, Serum: 0.6 (21 @ 04:30)  Creatinine, Serum: 0.7 (21 @ 08:00)       ROS  General: Denies rigors, nightsweats  HEENT: Denies headache, rhinorrhea, sore throat, eye pain  CV: Denies CP, palpitations  PULM: Denies wheezing, hemoptysis  GI: Denies hematemesis, hematochezia, melena  : Denies discharge, hematuria  MSK: Denies arthralgias, myalgias  SKIN: Denies rash, lesions  NEURO: Denies paresthesias, weakness  PSYCH: Denies depression, anxiety    VITALS:  T(F): 98.1, Max: 99.4 (21 @ 15:53)  HR: 80  BP: 97/60  RR: 18Vital Signs Last 24 Hrs  T(C): 36.7 (2021 12:47), Max: 37.4 (2021 15:53)  T(F): 98.1 (2021 12:47), Max: 99.4 (2021 15:53)  HR: 80 (2021 12:47) (77 - 91)  BP: 97/60 (2021 12:47) (92/54 - 98/55)  BP(mean): 70 (2021 12:47) (69 - 73)  RR: 18 (2021 12:47) (18 - 20)  SpO2: 97% (2021 12:47) (96% - 97%)    PHYSICAL EXAM:  Gen: NAD, resting in bed chronically ill appearing contracted  HEENT: Normocephalic, atraumatic  Neck: supple, no lymphadenopathy  CV: Regular rate & regular rhythm  Lungs: decreased BS at bases, no fremitus  Abdomen: Soft, BS present PEG, PCN dislodged  Ext: Warm, well perfused  Neuro: non focal, awake  Skin: no rash, no erythema  Lines: no phlebitis    FH: Non-contributory  Social Hx: Non-contributory    TESTS & MEASUREMENTS:                        12.2   9.37  )-----------( 217      ( 2021 04:30 )             36.2         137  |  95<L>  |  25<H>  ----------------------------<  88  3.4<L>   |  34<H>  |  0.6<L>    Ca    8.6      2021 04:30  Mg     1.5         TPro  5.7<L>  /  Alb  3.2<L>  /  TBili  0.4  /  DBili  x   /  AST  20  /  ALT  21  /  AlkPhos  76      eGFR if Non : 106 mL/min/1.73M2 (21 @ 04:30)  eGFR if African American: 122 mL/min/1.73M2 (21 @ 04:30)    LIVER FUNCTIONS - ( 2021 04:30 )  Alb: 3.2 g/dL / Pro: 5.7 g/dL / ALK PHOS: 76 U/L / ALT: 21 U/L / AST: 20 U/L / GGT: x           Urinalysis Basic - ( 2021 15:20 )    Color: Light Brown / Appearance: Slightly Turbid / S.014 / pH: x  Gluc: x / Ketone: Small  / Bili: Negative / Urobili: <2 mg/dL   Blood: x / Protein: 30 mg/dL / Nitrite: Negative   Leuk Esterase: Negative / RBC: >720 /HPF / WBC 10 /HPF   Sq Epi: x / Non Sq Epi: 6 /HPF / Bacteria: Negative        Culture - Blood (collected 21 @ 05:52)  Source: .Blood None  Preliminary Report (21 @ 19:01):    No growth to date.    Culture - Urine (collected 21 @ 16:00)  Source: Kidney Nephrostomy - Right  Final Report (21 @ 17:15):    No growth at 48 hours    Culture - Blood (collected 21 @ 06:22)  Source: .Blood None  Preliminary Report (21 @ 13:02):    No growth to date.    Culture - Blood (collected 21 @ 07:17)  Source: .Blood None  Preliminary Report (21 @ 19:02):    No growth to date.    Culture - Blood (collected 21 @ 19:24)  Source: .Blood Blood-Peripheral  Preliminary Report (21 @ 01:02):    No growth to date.    Culture - Urine (collected 21 @ 14:23)  Source: Kidney Right Kidney  Final Report (21 @ 08:46):    Numerous Pseudomonas aeruginosa (Carbapenem Resistant) Multiple    Morphological Strains    Numerous Proteus mirabilis ESBL    Numerous Staphylococcus aureus    Numerous Actinotignum schaali group "Susceptibilities not performed"  Organism: Pseudomonas aeruginosa (Carbapenem Resistant)  Proteus mirabilis ESBL  Enterococcus faecalis  Staphylococcus aureus (21 @ 08:46)  Organism: Staphylococcus aureus (21 @ 08:46)      -  Ampicillin/Sulbactam: S <=8/4      -  Cefazolin: S <=4      -  Gentamicin: S <=1 Should not be used as monotherapy      -  Oxacillin: S 1      -  Penicillin: R >8      -  RIF- Rifampin: S <=1 Should not be used as monotherapy      -  Tetra/Doxy: S <=1      -  Trimethoprim/Sulfamethoxazole: S <=0.5/9.5      -  Vancomycin: S 2      Method Type: MIGUEL A  Organism: Enterococcus faecalis (21 @ 08:46)      -  Ampicillin: S <=2 Predicts results to ampicillin/sulbactam, amoxacillin-clavulanate and  piperacillin-tazobactam.      -  Ciprofloxacin: S <=1      -  Levofloxacin: S <=1      -  Tetra/Doxy: R >8      -  Vancomycin: S 2      Method Type: MIGUEL A  Organism: Proteus mirabilis ESBL (21 @ 08:46)      -  Amikacin: S <=16      -  Amoxicillin/Clavulanic Acid: S <=8/4      -  Ampicillin: R >16 These ampicillin results predict results for amoxicillin      -  Ampicillin/Sulbactam: R 8/4 Enterobacter, Citrobacter, and Serratia may develop resistance during prolonged therapy (3-4 days)      -  Aztreonam: R >16      -  Cefazolin: R >16 (MIC_CL_[id number]) For uncomplicated UTI with K. pneumoniae, E. coli, or P. mirablis: MIGUEL A <=16 is sensitive and MIGUEL A >=32 is resistant. This also predicts results for oral agents cefaclor, cefdinir, cefpodoxime, cefprozil, cefuroxime axetil, cephalexin and locarbef for uncomplicated UTI. Note that some isolates may be susceptible to these agents while testing resistant to cefazolin.      -  Cefepime: R >16      -  Cefoxitin: S <=8      -  Ceftriaxone: R >32 Enterobacter, Citrobacter, and Serratia may develop resistance during prolonged therapy      -  Ciprofloxacin: R >2      -  Ertapenem: S <=0.5      -  Gentamicin: S <=2      -  Levofloxacin: R >4      -  Meropenem: S <=1      -  Piperacillin/Tazobactam: R <=8      -  Tobramycin: S <=2      -  Trimethoprim/Sulfamethoxazole: R >2/38      Method Type: MIGUEL A  Organism: Pseudomonas aeruginosa (Carbapenem Resistant) (21 @ 08:46)      -  Amikacin: S <=16      -  Aztreonam: S <=4      -  Cefepime: S 8      -  Ceftazidime: S 8      -  Ciprofloxacin: R >2      -  Gentamicin: S 4      -  Imipenem: R >8      -  Levofloxacin: R >4      -  Meropenem: I 4      -  Piperacillin/Tazobactam: I 32      -  Tobramycin: S <=2      Method Type: MIGUEL A    Culture - Blood (collected 21 @ 09:29)  Source: .Blood None  Gram Stain (21 @ 07:48):    Upon re-evaluation of gram stain:    Growth in aerobic and anaerobic bottles: Gram Negative Rods and Gram    Positive Cocci in Pairs and Chains  Final Report (21 @ 12:03):    Growth in aerobic and anaerobic bottles: Enterococcus faecalis    Growth in aerobic and anaerobic bottles: Pseudomonas aeruginosa    (Carbapenem Resistant)    Growth in anaerobic bottle: Proteus mirabilis ESBL    ***Blood Panel PCR results on this specimenare available    approximately 3 hours after the Gram stain result.***    Gram stain, PCR, and/or culture results may not always    correspond due to difference in methodologies.    ************************************************************    This PCR assaywas performed by multiplex PCR. This    Assay tests for 66 bacterial and resistance gene targets.    Please refer to the [facility] test directory    at [email or web address] for details.  Organism: Blood Culture PCR  Blood Culture PCR  Enterococcus faecalis  Pseudomonas aeruginosa (Carbapenem Resistant)  Proteus mirabilis ESBL (21 @ 12:07)  Organism: Proteus mirabilis ESBL (21 @ 12:07)      -  Amikacin: R >32      -  Ampicillin: R >16 These ampicillin results predict results for amoxicillin      -  Ampicillin/Sulbactam: R 8/4 Enterobacter, Citrobacter, and Serratia may develop resistance during prolonged therapy (3-4 days)      -  Aztreonam: R >16      -  Cefazolin: R >16 Enterobacter, Citrobacter, and Serratia may develop resistance during prolonged therapy (3-4 days)      -  Cefepime: R >16      -  Cefoxitin: R >16      -  Ceftazidime/Avibactam: R >16      -  Ceftolozane/tazobactam: S <=2      -  Ceftriaxone: R >32 Enterobacter, Citrobacter, and Serratia may develop resistance during prolonged therapy      -  Ciprofloxacin: R >2      -  Ertapenem: R >1      -  Gentamicin: I 8      -  Levofloxacin: R >4      -  Meropenem: I 2      -  Piperacillin/Tazobactam: R <=8      -  Tobramycin: S <=2      -  Trimethoprim/Sulfamethoxazole: R >2/38      Method Type: MIGUEL A  Organism: Pseudomonas aeruginosa (Carbapenem Resistant) (21 @ 12:07)      -  Amikacin: S <=16      -  Aztreonam: S 8      -  Cefepime: I 16      -  Ceftazidime: I 16      -  Ciprofloxacin: R >2      -  Gentamicin: S 4      -  Imipenem: R >8      -  Levofloxacin: R >4      -  Meropenem: R 8      -  Piperacillin/Tazobactam: I 64      -  Tobramycin: S <=2      Method Type: MIGUEL A  Organism: Enterococcus faecalis (21 @ 12:06)      -  Ampicillin: S <=2 Predicts results to ampicillin/sulbactam, amoxacillin-clavulanate and  piperacillin-tazobactam.      -  Gentamicin synergy: S      -  Vancomycin: S 2      Method Type: MIGUEL A  Organism: Blood Culture PCR (21 @ 12:04)      -  Pseudomonas aeruginosa: Detec      Method Type: PCR  Organism: Blood Culture PCR (21 @ 12:04)      -  Enterococcus faecalis,VRE: Detec      Method Type: PCR    Culture - Urine (collected 21 @ 16:27)  Source: .Urine Clean Catch (Midstream)  Final Report (21 @ 11:36):    >=3 organisms. Probable collection contamination.            INFECTIOUS DISEASES TESTING  COVID- PCR: NotDetec (21 @ 22:22)  Rapid RVP Result: NotDetec (21 @ 11:14)  COVID-19 PCR: NotDetec (20 @ 15:45)  COVID-19 PCR: NotDetec (20 @ 14:19)      INFLAMMATORY MARKERS  C-Reactive Protein, Serum: 120 mg/L (21 @ 09:29)      RADIOLOGY & ADDITIONAL TESTS:  I have personally reviewed the last available Chest xray  CXR      CT  CT Abdomen and Pelvis No Cont:   EXAM:  CT ABDOMEN AND PELVIS            PROCEDURE DATE:  2021            INTERPRETATION:  CLINICAL STATEMENT: PEG tube and nephrostomy tube displacement    TECHNIQUE: Contiguous axial CT images were obtained from the lower chest to the pubic symphysis without intravenous contrast.  Oral contrast was not administered.  Reformatted images in the coronal and sagittal planes were acquired. Suboptimal positioning due to patient contraction.    COMPARISON CT: 2021    OTHER STUDIES USEDFOR CORRELATION: None.      FINDINGS:    LOWER CHEST: New small bilateral pleural effusions and increased bibasilar atelectasis.    HEPATOBILIARY: Unremarkable.    SPLEEN: Unremarkable.    PANCREAS: Unremarkable.    ADRENAL GLANDS: Unremarkable.    KIDNEYS: Interval placement of a right-sided percutaneous nephrostomy tube with pigtail in the renal pelvis/UPJ. 2 right renal pelvic calculi noted, measuring 0.6 x 0.5 x 0.6 cm and 0.5 x 0.4 x 0.5 cm (5/172 and 158). Additional punctate nonobstructingbilateral intrarenal calculi. Improved now mild right hydroureteronephrosis.    ABDOMINOPELVIC NODES: Unchanged.    PELVIC ORGANS: New hyperdense material seen within the urinary bladder, possibly dilute contrast from recent intervention. Stable indwelling suprapubic catheter.    PERITONEUM/MESENTERY/BOWEL: Gastrostomy tube balloon terminates within the gastric lumen. No bowel obstruction, ascites or pneumoperitoneum.    BONES/SOFT TISSUES: Stable visualized osseous structures and soft tissues.    OTHER: Unchanged.    IMPRESSION:  1.  Since 2021, interval placement of a right-sided percutaneous nephrostomy tube, appropriately positioned with decreased now mild hydroureteronephrosis; 2 right renal pelvic/UPJ calculi above.    2.  Appropriately positioned gastrostomy tube.    3.  New small bilateral pleural effusions and increased bibasilar atelectasis.    4.  Additional stable findings as above.              JULIO CASTRO MD; Attending Radiologist  This document has been electronically signed. 2021  2:25PM (21 @ 13:59)      CARDIOLOGY TESTING  12 Lead ECG:   Ventricular Rate 85 BPM    Atrial Rate 85 BPM    P-R Interval 122 ms    QRS Duration 80 ms    Q-T Interval 380 ms    QTC Calculation(Bazett) 452 ms    P Axis 46 degrees    R Axis 22 degrees    T Axis -10 degrees    Diagnosis Line Normal sinus rhythm  Nonspecific T wave abnormality minor  Otherwise normal ECG    Confirmed by YESICA SALAZAR MD ([id number]) on 2021 10:46:39 AM (21 @ 07:53)  12 Lead ECG:   Ventricular Rate 112 BPM    Atrial Rate 112 BPM    P-R Interval 120 ms    QRS Duration 84 ms    Q-T Interval 348 ms    QTC Calculation(Bazett) 475 ms    P Axis 45 degrees    R Axis 36 degrees    T Axis 4 degrees    Diagnosis Line Sinus tachycardia  Otherwise normal ECG    Confirmed by EBENEZER EDDY MD ([id number]) on 2021 4:15:45 PM (21 @ 14:35)      MEDICATIONS  chlorhexidine 4% Liquid 1 Topical <User Schedule>  furosemide    Tablet 40 Oral daily  meropenem  IVPB 2000 IV Intermittent every 8 hours  nystatin Powder 1 Topical two times a day  PHENobarbital Injectable 60 IV Push daily  polymyxin B IVPB [id number] IV Intermittent every 12 hours  predniSONE   Tablet 20 Oral daily  tamsulosin 0.4 Oral at bedtime      WEIGHT  Weight (kg): 57.4 (21 @ 03:25)  Creatinine, Serum: 0.6 mg/dL (21 @ 04:30)      ANTIBIOTICS:  meropenem  IVPB 2000 milliGRAM(s) IV Intermittent every 8 hours  polymyxin B IVPB 056881 Unit(s) IV Intermittent every 12 hours      All available historical records have been reviewed

## 2021-06-25 NOTE — PROGRESS NOTE ADULT - ASSESSMENT
65 year old  male s/p right PCN placement by IR on 6/18 with sepsis & hypotension - awaiting PCNL when medically optimal. in NAD today. T max 100.4 noted.       Plan:   As per discussion with IR resident (spectra [phone]) Pt. will be going for Right PCNU next Monday. NPO after midnight on Sunday. Pre-op labs. Hold AC.   Please provide medical/cardio clearances.   ID clearance appreciated   Please obtain COVID test on Sunday 6/27/21 evening.   Tentatively OR Right PCNL procedure on 7/29

## 2021-06-26 LAB
ALBUMIN SERPL ELPH-MCNC: 3.1 G/DL — LOW (ref 3.5–5.2)
ALP SERPL-CCNC: 76 U/L — SIGNIFICANT CHANGE UP (ref 30–115)
ALT FLD-CCNC: 22 U/L — SIGNIFICANT CHANGE UP (ref 0–41)
ANION GAP SERPL CALC-SCNC: 11 MMOL/L — SIGNIFICANT CHANGE UP (ref 7–14)
APTT BLD: 28.1 SEC — SIGNIFICANT CHANGE UP (ref 27–39.2)
AST SERPL-CCNC: 25 U/L — SIGNIFICANT CHANGE UP (ref 0–41)
BILIRUB SERPL-MCNC: 0.4 MG/DL — SIGNIFICANT CHANGE UP (ref 0.2–1.2)
BUN SERPL-MCNC: 20 MG/DL — SIGNIFICANT CHANGE UP (ref 10–20)
CALCIUM SERPL-MCNC: 8.5 MG/DL — SIGNIFICANT CHANGE UP (ref 8.5–10.1)
CHLORIDE SERPL-SCNC: 96 MMOL/L — LOW (ref 98–110)
CO2 SERPL-SCNC: 31 MMOL/L — SIGNIFICANT CHANGE UP (ref 17–32)
CREAT SERPL-MCNC: 0.6 MG/DL — LOW (ref 0.7–1.5)
CULTURE RESULTS: SIGNIFICANT CHANGE UP
CULTURE RESULTS: SIGNIFICANT CHANGE UP
GLUCOSE SERPL-MCNC: 70 MG/DL — SIGNIFICANT CHANGE UP (ref 70–99)
HCT VFR BLD CALC: 35.4 % — LOW (ref 42–52)
HGB BLD-MCNC: 11.8 G/DL — LOW (ref 14–18)
INR BLD: 1.17 RATIO — SIGNIFICANT CHANGE UP (ref 0.65–1.3)
MAGNESIUM SERPL-MCNC: 1.6 MG/DL — LOW (ref 1.8–2.4)
MCHC RBC-ENTMCNC: 30.2 PG — SIGNIFICANT CHANGE UP (ref 27–31)
MCHC RBC-ENTMCNC: 33.3 G/DL — SIGNIFICANT CHANGE UP (ref 32–37)
MCV RBC AUTO: 90.5 FL — SIGNIFICANT CHANGE UP (ref 80–94)
NRBC # BLD: 0 /100 WBCS — SIGNIFICANT CHANGE UP (ref 0–0)
PLATELET # BLD AUTO: 246 K/UL — SIGNIFICANT CHANGE UP (ref 130–400)
POTASSIUM SERPL-MCNC: 4 MMOL/L — SIGNIFICANT CHANGE UP (ref 3.5–5)
POTASSIUM SERPL-SCNC: 4 MMOL/L — SIGNIFICANT CHANGE UP (ref 3.5–5)
PROT SERPL-MCNC: 5.8 G/DL — LOW (ref 6–8)
PROTHROM AB SERPL-ACNC: 13.4 SEC — HIGH (ref 9.95–12.87)
RBC # BLD: 3.91 M/UL — LOW (ref 4.7–6.1)
RBC # FLD: 12.8 % — SIGNIFICANT CHANGE UP (ref 11.5–14.5)
SODIUM SERPL-SCNC: 138 MMOL/L — SIGNIFICANT CHANGE UP (ref 135–146)
SPECIMEN SOURCE: SIGNIFICANT CHANGE UP
SPECIMEN SOURCE: SIGNIFICANT CHANGE UP
WBC # BLD: 9.92 K/UL — SIGNIFICANT CHANGE UP (ref 4.8–10.8)
WBC # FLD AUTO: 9.92 K/UL — SIGNIFICANT CHANGE UP (ref 4.8–10.8)

## 2021-06-26 PROCEDURE — 71045 X-RAY EXAM CHEST 1 VIEW: CPT | Mod: 26

## 2021-06-26 PROCEDURE — 99232 SBSQ HOSP IP/OBS MODERATE 35: CPT

## 2021-06-26 RX ORDER — FUROSEMIDE 40 MG
40 TABLET ORAL DAILY
Refills: 0 | Status: DISCONTINUED | OUTPATIENT
Start: 2021-06-26 | End: 2021-06-26

## 2021-06-26 RX ORDER — MAGNESIUM SULFATE 500 MG/ML
2 VIAL (ML) INJECTION ONCE
Refills: 0 | Status: COMPLETED | OUTPATIENT
Start: 2021-06-26 | End: 2021-06-26

## 2021-06-26 RX ADMIN — NYSTATIN CREAM 1 APPLICATION(S): 100000 CREAM TOPICAL at 17:42

## 2021-06-26 RX ADMIN — MEROPENEM 200 MILLIGRAM(S): 1 INJECTION INTRAVENOUS at 13:03

## 2021-06-26 RX ADMIN — Medication 50 GRAM(S): at 10:49

## 2021-06-26 RX ADMIN — NYSTATIN CREAM 1 APPLICATION(S): 100000 CREAM TOPICAL at 05:15

## 2021-06-26 RX ADMIN — Medication 20 MILLIGRAM(S): at 05:15

## 2021-06-26 RX ADMIN — MEROPENEM 200 MILLIGRAM(S): 1 INJECTION INTRAVENOUS at 21:38

## 2021-06-26 RX ADMIN — POLYMYXIN B SULFATE 500 UNIT(S): 500000 INJECTION, POWDER, LYOPHILIZED, FOR SOLUTION INTRAMUSCULAR; INTRATHECAL; INTRAVENOUS; OPHTHALMIC at 05:15

## 2021-06-26 RX ADMIN — MEROPENEM 200 MILLIGRAM(S): 1 INJECTION INTRAVENOUS at 05:15

## 2021-06-26 RX ADMIN — CHLORHEXIDINE GLUCONATE 1 APPLICATION(S): 213 SOLUTION TOPICAL at 05:15

## 2021-06-26 RX ADMIN — POLYMYXIN B SULFATE 500 UNIT(S): 500000 INJECTION, POWDER, LYOPHILIZED, FOR SOLUTION INTRAMUSCULAR; INTRATHECAL; INTRAVENOUS; OPHTHALMIC at 17:43

## 2021-06-26 RX ADMIN — Medication 60 MILLIGRAM(S): at 15:09

## 2021-06-26 RX ADMIN — Medication 40 MILLIGRAM(S): at 05:15

## 2021-06-26 NOTE — PROGRESS NOTE ADULT - ASSESSMENT
IMPRESSION:    Sepsis present on admission improving  Septic shock, improving  Obstructive pyelonephritis s/p Right PCN ?  dislodged/ hematuria ct reviewed  Pseudomonas & E. faecalis VRE bacteremia  Chronic suprapubic alatorre  Seizure yesterday? HO Seizures  HO Cerebral palsy chronically contracted      PLAN:    CNS:  Avoid CNS depressants.     HEENT:  Oral care.  Aspiration precautions.    PULMONARY:  HOB @ 45 degrees. CXR today.  Wean O2 as tolerated, Goal 88 to 94%, NC,    CARDIOVASCULAR:  dc lasix    GI: GI prophylaxis. po    RENAL: FU lytes.  Correct as needed.  Monitor UO.  Monitor nephrostomy drain.    INFECTIOUS DISEASE:  ABX PER ID    HEMATOLOGICAL:  DVT prophylaxis.     ENDOCRINE:  Follow up FS.  Insulin protocol if needed.    MUSCULOSKELETAL: bed rest    CODE STATUS: FULL CODE    DISPOSITION: SDU

## 2021-06-26 NOTE — PROGRESS NOTE ADULT - SUBJECTIVE AND OBJECTIVE BOX
OVERNIGHT EVENTS: events noted, ID/ IR/ Urology reviewed    Vital Signs Last 24 Hrs  T(C): 37.3 (26 Jun 2021 04:00), Max: 38 (25 Jun 2021 15:48)  T(F): 99.1 (26 Jun 2021 04:00), Max: 100.4 (25 Jun 2021 15:48)  HR: 80 (26 Jun 2021 04:00) (80 - 92)  BP: 100/65 (26 Jun 2021 04:00) (94/54 - 105/67)  BP(mean): 76 (25 Jun 2021 15:48) (69 - 76)  RR: 20 (26 Jun 2021 04:00) (18 - 20)  SpO2: 97% (26 Jun 2021 04:00) (97% - 97%)    PHYSICAL EXAMINATION:    GENERAL: ill looking    HEENT: Head is normocephalic and atraumatic.    NECK: Supple.    LUNGS: dec bs both bases    HEART: MARCIAL 2/6    ABDOMEN: Soft, nontender, and nondistended.      EXTREMITIES: Without any cyanosis, clubbing, rash, lesions or edema.    NEUROLOGIC: not following commands        LABS:                        11.8   9.92  )-----------( 246      ( 26 Jun 2021 04:30 )             35.4     06-26    138  |  96<L>  |  20  ----------------------------<  70  4.0   |  31  |  0.6<L>    Ca    8.5      26 Jun 2021 04:30  Mg     1.6     06-26    TPro  5.8<L>  /  Alb  3.1<L>  /  TBili  0.4  /  DBili  x   /  AST  25  /  ALT  22  /  AlkPhos  76  06-26    PT/INR - ( 26 Jun 2021 04:30 )   PT: 13.40 sec;   INR: 1.17 ratio         PTT - ( 26 Jun 2021 04:30 )  PTT:28.1 sec                      06-24-21 @ 07:01  -  06-25-21 @ 07:00  --------------------------------------------------------  IN: 0 mL / OUT: 2032 mL / NET: -2032 mL    06-25-21 @ 07:01  -  06-26-21 @ 06:57  --------------------------------------------------------  IN: 0 mL / OUT: 1601 mL / NET: -1601 mL        MICROBIOLOGY:  Culture Results:   No growth to date. (06-24 @ 08:00)  Culture Results:   No growth to date. (06-24 @ 08:00)  Culture Results:   No growth to date. (06-23 @ 05:52)  Culture Results:   No growth at 48 hours (06-22 @ 16:00)      MEDICATIONS  (STANDING):  chlorhexidine 4% Liquid 1 Application(s) Topical <User Schedule>  furosemide   Injectable 40 milliGRAM(s) IV Push daily  lactated ringers. 1000 milliLiter(s) (50 mL/Hr) IV Continuous <Continuous>  meropenem  IVPB 2000 milliGRAM(s) IV Intermittent every 8 hours  methylPREDNISolone sodium succinate Injectable 20 milliGRAM(s) IV Push daily  nystatin Powder 1 Application(s) Topical two times a day  PHENobarbital Injectable 60 milliGRAM(s) IV Push daily  polymyxin B IVPB [id number] Unit(s) IV Intermittent every 12 hours  tamsulosin 0.4 milliGRAM(s) Oral at bedtime    MEDICATIONS  (PRN):  acetaminophen  Suppository .. 650 milliGRAM(s) Rectal every 6 hours PRN Temp greater or equal to 38C (100.4F)  ALBUTerol    90 MICROgram(s) HFA Inhaler 2 Puff(s) Inhalation every 6 hours PRN Bronchospasm      RADIOLOGY & ADDITIONAL STUDIES:

## 2021-06-27 LAB
ALBUMIN SERPL ELPH-MCNC: 3 G/DL — LOW (ref 3.5–5.2)
ALP SERPL-CCNC: 69 U/L — SIGNIFICANT CHANGE UP (ref 30–115)
ALT FLD-CCNC: 18 U/L — SIGNIFICANT CHANGE UP (ref 0–41)
ANION GAP SERPL CALC-SCNC: 16 MMOL/L — HIGH (ref 7–14)
APTT BLD: 29.2 SEC — SIGNIFICANT CHANGE UP (ref 27–39.2)
AST SERPL-CCNC: 22 U/L — SIGNIFICANT CHANGE UP (ref 0–41)
BASOPHILS # BLD AUTO: 0.01 K/UL — SIGNIFICANT CHANGE UP (ref 0–0.2)
BASOPHILS NFR BLD AUTO: 0.1 % — SIGNIFICANT CHANGE UP (ref 0–1)
BILIRUB SERPL-MCNC: 0.3 MG/DL — SIGNIFICANT CHANGE UP (ref 0.2–1.2)
BLD GP AB SCN SERPL QL: SIGNIFICANT CHANGE UP
BUN SERPL-MCNC: 20 MG/DL — SIGNIFICANT CHANGE UP (ref 10–20)
CALCIUM SERPL-MCNC: 8.7 MG/DL — SIGNIFICANT CHANGE UP (ref 8.5–10.1)
CHLORIDE SERPL-SCNC: 93 MMOL/L — LOW (ref 98–110)
CO2 SERPL-SCNC: 29 MMOL/L — SIGNIFICANT CHANGE UP (ref 17–32)
CREAT SERPL-MCNC: 0.6 MG/DL — LOW (ref 0.7–1.5)
CULTURE RESULTS: SIGNIFICANT CHANGE UP
EOSINOPHIL # BLD AUTO: 0.35 K/UL — SIGNIFICANT CHANGE UP (ref 0–0.7)
EOSINOPHIL NFR BLD AUTO: 3.6 % — SIGNIFICANT CHANGE UP (ref 0–8)
GLUCOSE BLDC GLUCOMTR-MCNC: 91 MG/DL — SIGNIFICANT CHANGE UP (ref 70–99)
GLUCOSE SERPL-MCNC: 61 MG/DL — LOW (ref 70–99)
HCT VFR BLD CALC: 33.6 % — LOW (ref 42–52)
HGB BLD-MCNC: 11.5 G/DL — LOW (ref 14–18)
IMM GRANULOCYTES NFR BLD AUTO: 1.9 % — HIGH (ref 0.1–0.3)
INR BLD: 1.21 RATIO — SIGNIFICANT CHANGE UP (ref 0.65–1.3)
LYMPHOCYTES # BLD AUTO: 0.64 K/UL — LOW (ref 1.2–3.4)
LYMPHOCYTES # BLD AUTO: 6.7 % — LOW (ref 20.5–51.1)
MAGNESIUM SERPL-MCNC: 1.4 MG/DL — LOW (ref 1.8–2.4)
MCHC RBC-ENTMCNC: 30.8 PG — SIGNIFICANT CHANGE UP (ref 27–31)
MCHC RBC-ENTMCNC: 34.2 G/DL — SIGNIFICANT CHANGE UP (ref 32–37)
MCV RBC AUTO: 90.1 FL — SIGNIFICANT CHANGE UP (ref 80–94)
MONOCYTES # BLD AUTO: 0.59 K/UL — SIGNIFICANT CHANGE UP (ref 0.1–0.6)
MONOCYTES NFR BLD AUTO: 6.2 % — SIGNIFICANT CHANGE UP (ref 1.7–9.3)
NEUTROPHILS # BLD AUTO: 7.82 K/UL — HIGH (ref 1.4–6.5)
NEUTROPHILS NFR BLD AUTO: 81.5 % — HIGH (ref 42.2–75.2)
NRBC # BLD: 0 /100 WBCS — SIGNIFICANT CHANGE UP (ref 0–0)
PLATELET # BLD AUTO: 247 K/UL — SIGNIFICANT CHANGE UP (ref 130–400)
POTASSIUM SERPL-MCNC: 3.3 MMOL/L — LOW (ref 3.5–5)
POTASSIUM SERPL-SCNC: 3.3 MMOL/L — LOW (ref 3.5–5)
PROT SERPL-MCNC: 5.7 G/DL — LOW (ref 6–8)
PROTHROM AB SERPL-ACNC: 13.9 SEC — HIGH (ref 9.95–12.87)
RBC # BLD: 3.73 M/UL — LOW (ref 4.7–6.1)
RBC # FLD: 12.6 % — SIGNIFICANT CHANGE UP (ref 11.5–14.5)
SARS-COV-2 RNA SPEC QL NAA+PROBE: SIGNIFICANT CHANGE UP
SODIUM SERPL-SCNC: 138 MMOL/L — SIGNIFICANT CHANGE UP (ref 135–146)
SPECIMEN SOURCE: SIGNIFICANT CHANGE UP
WBC # BLD: 9.59 K/UL — SIGNIFICANT CHANGE UP (ref 4.8–10.8)
WBC # FLD AUTO: 9.59 K/UL — SIGNIFICANT CHANGE UP (ref 4.8–10.8)

## 2021-06-27 PROCEDURE — 99232 SBSQ HOSP IP/OBS MODERATE 35: CPT

## 2021-06-27 RX ORDER — MAGNESIUM SULFATE 500 MG/ML
2 VIAL (ML) INJECTION ONCE
Refills: 0 | Status: COMPLETED | OUTPATIENT
Start: 2021-06-27 | End: 2021-06-27

## 2021-06-27 RX ORDER — POTASSIUM CHLORIDE 20 MEQ
20 PACKET (EA) ORAL ONCE
Refills: 0 | Status: COMPLETED | OUTPATIENT
Start: 2021-06-27 | End: 2021-06-27

## 2021-06-27 RX ADMIN — POLYMYXIN B SULFATE 500 UNIT(S): 500000 INJECTION, POWDER, LYOPHILIZED, FOR SOLUTION INTRAMUSCULAR; INTRATHECAL; INTRAVENOUS; OPHTHALMIC at 05:11

## 2021-06-27 RX ADMIN — NYSTATIN CREAM 1 APPLICATION(S): 100000 CREAM TOPICAL at 05:12

## 2021-06-27 RX ADMIN — MEROPENEM 200 MILLIGRAM(S): 1 INJECTION INTRAVENOUS at 16:24

## 2021-06-27 RX ADMIN — Medication 50 MILLIEQUIVALENT(S): at 16:23

## 2021-06-27 RX ADMIN — MEROPENEM 200 MILLIGRAM(S): 1 INJECTION INTRAVENOUS at 05:11

## 2021-06-27 RX ADMIN — Medication 50 GRAM(S): at 16:24

## 2021-06-27 RX ADMIN — MEROPENEM 200 MILLIGRAM(S): 1 INJECTION INTRAVENOUS at 22:18

## 2021-06-27 RX ADMIN — CHLORHEXIDINE GLUCONATE 1 APPLICATION(S): 213 SOLUTION TOPICAL at 05:09

## 2021-06-27 RX ADMIN — NYSTATIN CREAM 1 APPLICATION(S): 100000 CREAM TOPICAL at 17:15

## 2021-06-27 RX ADMIN — Medication 60 MILLIGRAM(S): at 17:13

## 2021-06-27 RX ADMIN — Medication 20 MILLIGRAM(S): at 05:13

## 2021-06-27 RX ADMIN — POLYMYXIN B SULFATE 500 UNIT(S): 500000 INJECTION, POWDER, LYOPHILIZED, FOR SOLUTION INTRAMUSCULAR; INTRATHECAL; INTRAVENOUS; OPHTHALMIC at 17:26

## 2021-06-27 NOTE — SWALLOW BEDSIDE ASSESSMENT ADULT - SWALLOW EVAL: RECOMMENDED DIET
Dysphagia Diet II mechanical soft consistency with ground meat, nectar Dysphagia diet I puree consistency, Nectar-thickened liquids 1:1

## 2021-06-27 NOTE — CHART NOTE - NSCHARTNOTEFT_GEN_A_CORE
Registered Dietitian Follow-Up     Patient Profile Reviewed                           Yes [x]   No []     Nutrition History Previously Obtained        Yes [x]  No []       Pertinent Subjective Information: As per RN pt is not receiving feeds due to clogged PEG tube.      Pertinent Medical Interventions: INCOMPLETE NOTE     Diet order: NPO since 6/24; previously on Jevity 1.2- 300ml q 6hrs via PEG      Anthropometrics:  - Ht.  - Wt.  - %wt change  - BMI  - IBW     Pertinent Lab Data:     Pertinent Meds:     Physical Findings:  - Appearance:  - GI function:  - Tubes:  - Oral/Mouth cavity:  - Skin:     Nutrition Requirements  Weight Used:     Estimated Energy Needs    Continue []  Adjust []  Adjusted Energy Recommendations:   kcal/day        Estimated Protein Needs    Continue []  Adjust []  Adjusted Protein Recommendations:   gm/day        Estimated Fluid Needs        Continue []  Adjust []  Adjusted Fluid Recommendations:   mL/day     Nutrient Intake:        [] Previous Nutrition Diagnosis:            [] Ongoing          [] Resolved    [] No active nutrition diagnosis identified at this time     Nutrition Diagnostic #1  Problem:  Etiology:  Statement:     Nutrition Diagnostic #2  Problem:  Etiology:  Statement:     Nutrition Intervention      Goal/Expected Outcome:     Indicator/Monitoring:    Recommended: please consider SLP eval - pt was receiving please feeds at group home (puree w/ nectars). Once appropriate to resume EN please order Registered Dietitian Follow-Up     Patient Profile Reviewed                           Yes [x]   No []     Nutrition History Previously Obtained        Yes [x]  No []       Pertinent Subjective Information: As per RN pt is not receiving feeds due to clogged PEG tube.      Pertinent Medical Interventions: S/p right PCN placement by IR on 6/18 with sepsis & hypotension - awaiting PCNL (Percutaneous nephrolithotomy) when medically optimal (OR planned for tomorrow).        Diet order: NPO since 6/24; previously on Jevity 1.2- 300ml q 6hrs via PEG      Anthropometrics:  - Ht. 147.3cm  - Wt. 57.4kg (6/25) vs. dosing wt. 57.4kg (6/18)  - %wt change  - BMI - 26.5  - IBW -     Pertinent Lab Data: 6/27 Cl- 93, K-3.3, Cr- 0.6, GLu 61, ALb 3.0. Mg 1.4     Pertinent Meds: IV abx, Mg sulfate, K chloride, solumedrol, flomax     Physical Findings:  - Appearance: alert  - GI function: last BM 6/25  - Tubes: PEG  - Oral/Mouth cavity:  - Skin: excoriation     Nutrition Requirements  Weight Used:      Estimated Energy Needs    Continue [x]  Adjust [] 1435 -1722 kcal/d (25-30kcal/kg act wt)  Adjusted Energy Recommendations:   kcal/day        Estimated Protein Needs    Continue [x]  Adjust [] 57 - 69gm/d (1-1/2gm/kg act wt)  Adjusted Protein Recommendations:   gm/day        Estimated Fluid Needs        Continue [x]  Adjust []  per VENT team  Adjusted Fluid Recommendations:   mL/day     Nutrient Intake: not meeting needs; previous diet order (Jevity 1.2- 300ml q 6hrs provided 1440kcal, 67gm protein, 972ml H2o)        [] Previous Nutrition Diagnosis:  none            [] Ongoing          [] Resolved    [] No active nutrition diagnosis identified at this time     Nutrition Diagnostic #1  Problem: Inadequate protein - energy intake  Etiology: clogged PEG  Statement: pt has not been fed since Friday (day day2).      Nutrition Diagnostic #2  Problem:  Etiology:  Statement:     Nutrition Intervention: Enteral nutrition      Goal/Expected Outcome: Enteral nutrition to be resumed w/in 24-72hrs     Indicator/Monitoring: Will monitor energy intake, diet order, body composition, glucose/renal profile, NFPF    Recommended: please consider SLP eval - pt was receiving pleasure feeds at group home (puree w/ nectars) - discussed w/ Vent resident.  Call GI team to evaluate PEG. Once appropriate to resume feeds please order Jevity 1.2- 300ml q 6hrs via PEG (will provide 1440kcal, 67gm protein, 972ml H2o). Additional fluids per TD team. Will follow. Registered Dietitian Follow-Up     Patient Profile Reviewed                           Yes [x]   No []     Nutrition History Previously Obtained        Yes [x]  No []       Pertinent Subjective Information: As per RN pt is not receiving feeds due to clogged PEG tube.      Pertinent Medical Interventions: S/p right PCN placement by IR on 6/18 with sepsis & hypotension - awaiting PCNL (Percutaneous nephrolithotomy) when medically optimal (OR planned for tomorrow).        Diet order: NPO since 6/24; previously on Jevity 1.2- 300ml q 6hrs via PEG      Anthropometrics:  - Ht. 147.3cm  - Wt. 57.4kg (6/25) vs. dosing wt. 57.4kg (6/18)  - %wt change  - BMI - 26.5  - IBW -     Pertinent Lab Data: 6/27 Cl- 93, K-3.3, Cr- 0.6, GLu 61, ALb 3.0. Mg 1.4     Pertinent Meds: IV abx, Mg sulfate, K chloride, solumedrol, flomax     Physical Findings:  - Appearance: alert  - GI function: last BM 6/25  - Tubes: PEG  - Oral/Mouth cavity:  - Skin: excoriation     Nutrition Requirements  Weight Used:      Estimated Energy Needs    Continue [x]  Adjust [] 1435 -1722 kcal/d (25-30kcal/kg act wt)  Adjusted Energy Recommendations:   kcal/day        Estimated Protein Needs    Continue [x]  Adjust [] 57 - 69gm/d (1-1/2gm/kg act wt)  Adjusted Protein Recommendations:   gm/day        Estimated Fluid Needs        Continue [x]  Adjust []  per VENT team  Adjusted Fluid Recommendations:   mL/day     Nutrient Intake: not meeting needs; previous diet order - Jevity 1.2- 300ml q 6hrs provided 1440kcal, 67gm protein, 972ml H2o        [] Previous Nutrition Diagnosis:  none            [] Ongoing          [] Resolved    [] No active nutrition diagnosis identified at this time     Nutrition Diagnostic #1  Problem: Inadequate protein - energy intake  Etiology: clogged PEG  Statement: pt has not been fed since Friday (day day2).      Nutrition Diagnostic #2  Problem:  Etiology:  Statement:     Nutrition Intervention: Enteral nutrition      Goal/Expected Outcome: Enteral nutrition to be resumed w/in 24-72hrs     Indicator/Monitoring: Will monitor energy intake, diet order, body composition, glucose/renal profile, NFPF    Recommended: please consider SLP eval - pt was receiving pleasure feeds at group home (puree w/ nectars) - discussed w/ Vent resident.  Call GI team to evaluate PEG. Once appropriate to resume feeds please order Jevity 1.2- 300ml q 6hrs via PEG (will provide 1440kcal, 67gm protein, 972ml H2o). Additional fluids per TD team. Will follow.

## 2021-06-27 NOTE — PROGRESS NOTE ADULT - ASSESSMENT
IMPRESSION:    Sepsis present on admission improving  Septic shock, improving  Obstructive pyelonephritis s/p Right PCN/ hematuria ct reviewed  Pseudomonas & E. faecalis VRE bacteremia  Chronic suprapubic alatorre  Seizure yesterday? HO Seizures  HO Cerebral palsy chronically contracted      PLAN:    CNS:  Avoid CNS depressants.     HEENT:  Oral care.  Aspiration precautions.    PULMONARY:  HOB @ 45 degrees. CXR today.  Wean O2 as tolerated, Goal 88 to 94%, NC, dc steroids    CARDIOVASCULAR:  keep equal    GI: GI prophylaxis. po    RENAL: FU lytes.  Correct as needed.  Monitor UO.  Monitor nephrostomy drain.    INFECTIOUS DISEASE:  ABX PER ID    HEMATOLOGICAL:  DVT prophylaxis.     ENDOCRINE:  Follow up FS.  Insulin protocol if needed.    MUSCULOSKELETAL: bed rest      DISPOSITION: SDU

## 2021-06-27 NOTE — SWALLOW BEDSIDE ASSESSMENT ADULT - SWALLOW EVAL: DIAGNOSIS
mild oral dysphagia for Dysphagia Diet II mechanical soft consistency with ground meat, nectar mild oral dysphagia for Dysphagia diet I puree consistency, Nectar-thickened liquids w/no overt aspiration/penetration

## 2021-06-27 NOTE — PROGRESS NOTE ADULT - SUBJECTIVE AND OBJECTIVE BOX
HPI:  65 year old male with a proximal R ureteral stone s/p R PCN placement, awaiting R PCNL when medically optimal.  Pt. seen and examined at bedside in NAD. No acute events overnight. Pt is scheduled for R PCNU conversion by IR tomorrow.         PAST MEDICAL & SURGICAL HISTORY:  BPH (benign prostatic hyperplasia)    Cerebral palsy    Seizure  last seizure &gt;10 years ago    Osteoporosis    Spastic quadriplegia    Urinary calculi    Urinary retention    Asthma    S/P percutaneous endoscopic gastrostomy (PEG) tube placement    H/O cystoscopy    Suprapubic catheter        REVIEW OF SYSTEMS   [x] A ten-point review of systems was otherwise negative except as noted.    MEDICATIONS  (STANDING):  chlorhexidine 4% Liquid 1 Application(s) Topical <User Schedule>  meropenem  IVPB 2000 milliGRAM(s) IV Intermittent every 8 hours  methylPREDNISolone sodium succinate Injectable 20 milliGRAM(s) IV Push daily  nystatin Powder 1 Application(s) Topical two times a day  PHENobarbital Injectable 60 milliGRAM(s) IV Push daily  polymyxin B IVPB 356857 Unit(s) IV Intermittent every 12 hours  tamsulosin 0.4 milliGRAM(s) Oral at bedtime    MEDICATIONS  (PRN):  acetaminophen  Suppository .. 650 milliGRAM(s) Rectal every 6 hours PRN Temp greater or equal to 38C (100.4F)  ALBUTerol    90 MICROgram(s) HFA Inhaler 2 Puff(s) Inhalation every 6 hours PRN Bronchospasm      Allergies    No Known Allergies    Intolerances        SOCIAL HISTORY: No illicit drug use    FAMILY HISTORY:      Vital Signs Last 24 Hrs  T(C): 36.6 (27 Jun 2021 08:00), Max: 36.8 (27 Jun 2021 00:00)  T(F): 97.8 (27 Jun 2021 08:00), Max: 98.2 (27 Jun 2021 00:00)  HR: 79 (27 Jun 2021 08:00) (79 - 98)  BP: 87/54 (27 Jun 2021 08:00) (86/53 - 108/56)  BP(mean): 65 (27 Jun 2021 08:00) (64 - 76)  RR: 18 (27 Jun 2021 08:00) (16 - 18)  SpO2: 100% (27 Jun 2021 08:00) (97% - 100%)    PHYSICAL EXAM:    Constitutional: NAD, awake and alert, contracted  HEENT: AT, EOMI.   Back: No CVA tenderness B/L,  Right PCN in place drains yellow urine   Respiratory: No accessory respiratory muscle use  Abd: Soft, distended, mild  ttp + SPT in place drains yellow urine.   Extremities: contracted extremities   Psychiatric: Normal mood, normal affect  Skin: No rashes      I&O's Summary    26 Jun 2021 07:01  -  27 Jun 2021 07:00  --------------------------------------------------------  IN: 550 mL / OUT: 675 mL / NET: -125 mL        LABS:                        11.5   9.59  )-----------( 247      ( 27 Jun 2021 06:30 )             33.6     06-27    138  |  93<L>  |  20  ----------------------------<  61<L>  3.3<L>   |  29  |  0.6<L>    Ca    8.7      27 Jun 2021 06:30  Mg     1.4     06-27    TPro  5.7<L>  /  Alb  3.0<L>  /  TBili  0.3  /  DBili  x   /  AST  22  /  ALT  18  /  AlkPhos  69  06-27    PT/INR - ( 27 Jun 2021 06:30 )   PT: 13.90 sec;   INR: 1.21 ratio         PTT - ( 27 Jun 2021 06:30 )  PTT:29.2 sec

## 2021-06-27 NOTE — PROGRESS NOTE ADULT - ASSESSMENT
65 year old  male s/p right PCN placement by IR on 6/18 with sepsis & hypotension - awaiting PCNL when medically optimal.       Plan:   Pt. will be going for Right PCNU next Monday. NPO after midnight on tonight. Pre-op labs. Hold AC.   Please provide medical/cardio clearances.   ID clearance appreciated   Please obtain COVID test today.  Tentatively OR Right PCNL procedure on 7/29

## 2021-06-27 NOTE — SWALLOW BEDSIDE ASSESSMENT ADULT - NS SPL SWALLOW CLINIC TRIAL FT
Dysphagia Diet II mechanical soft consistency with ground meat, nectar +tolerated w/o overt signs of aspiration/ penetration +mild oral dysphagia w/no overt s/s of aspiration/penetration

## 2021-06-27 NOTE — PROGRESS NOTE ADULT - SUBJECTIVE AND OBJECTIVE BOX
OVERNIGHT EVENTS: events noted, on 4 L NC    Vital Signs Last 24 Hrs  T(C): 36.7 (27 Jun 2021 03:00), Max: 36.9 (26 Jun 2021 08:00)  T(F): 98 (27 Jun 2021 03:00), Max: 98.5 (26 Jun 2021 08:00)  HR: 80 (27 Jun 2021 03:00) (80 - 98)  BP: 87/52 (27 Jun 2021 03:00) (86/53 - 108/56)  BP(mean): 64 (27 Jun 2021 03:00) (64 - 86)  RR: 16 (27 Jun 2021 03:00) (16 - 20)  SpO2: 97% (27 Jun 2021 03:00) (96% - 98%)    PHYSICAL EXAMINATION:    GENERAL: ill looking.     HEENT: Head is normocephalic and atraumatic.    NECK: Supple.    LUNGS: dec bs l base    HEART: Regular rate and rhythm without murmur.    ABDOMEN: Soft, nontender, and nondistended.      EXTREMITIES: Without any cyanosis, clubbing, rash, lesions or edema.    NEUROLOGIC: non focal        LABS:                        11.5   9.59  )-----------( 247      ( 27 Jun 2021 06:30 )             33.6     06-26    138  |  96<L>  |  20  ----------------------------<  70  4.0   |  31  |  0.6<L>    Ca    8.5      26 Jun 2021 04:30  Mg     1.6     06-26    TPro  5.8<L>  /  Alb  3.1<L>  /  TBili  0.4  /  DBili  x   /  AST  25  /  ALT  22  /  AlkPhos  76  06-26    PT/INR - ( 26 Jun 2021 04:30 )   PT: 13.40 sec;   INR: 1.17 ratio         PTT - ( 26 Jun 2021 04:30 )  PTT:28.1 sec                      06-25-21 @ 07:01  -  06-26-21 @ 07:00  --------------------------------------------------------  IN: 1200 mL / OUT: 1901 mL / NET: -701 mL    06-26-21 @ 07:01  -  06-27-21 @ 06:56  --------------------------------------------------------  IN: 550 mL / OUT: 675 mL / NET: -125 mL        MICROBIOLOGY:  Culture Results:   No growth to date. (06-25 @ 04:30)  Culture Results:   No growth to date. (06-24 @ 08:00)  Culture Results:   No growth to date. (06-24 @ 08:00)      MEDICATIONS  (STANDING):  chlorhexidine 4% Liquid 1 Application(s) Topical <User Schedule>  meropenem  IVPB 2000 milliGRAM(s) IV Intermittent every 8 hours  methylPREDNISolone sodium succinate Injectable 20 milliGRAM(s) IV Push daily  nystatin Powder 1 Application(s) Topical two times a day  PHENobarbital Injectable 60 milliGRAM(s) IV Push daily  polymyxin B IVPB 301383 Unit(s) IV Intermittent every 12 hours  tamsulosin 0.4 milliGRAM(s) Oral at bedtime    MEDICATIONS  (PRN):  acetaminophen  Suppository .. 650 milliGRAM(s) Rectal every 6 hours PRN Temp greater or equal to 38C (100.4F)  ALBUTerol    90 MICROgram(s) HFA Inhaler 2 Puff(s) Inhalation every 6 hours PRN Bronchospasm      RADIOLOGY & ADDITIONAL STUDIES:

## 2021-06-28 LAB
CULTURE RESULTS: SIGNIFICANT CHANGE UP
GLUCOSE BLDC GLUCOMTR-MCNC: 126 MG/DL — HIGH (ref 70–99)
SPECIMEN SOURCE: SIGNIFICANT CHANGE UP

## 2021-06-28 PROCEDURE — 99223 1ST HOSP IP/OBS HIGH 75: CPT

## 2021-06-28 PROCEDURE — 50434 CONVERT NEPHROSTOMY CATHETER: CPT | Mod: RT

## 2021-06-28 PROCEDURE — 99232 SBSQ HOSP IP/OBS MODERATE 35: CPT

## 2021-06-28 RX ORDER — SODIUM CHLORIDE 9 MG/ML
500 INJECTION, SOLUTION INTRAVENOUS
Refills: 0 | Status: DISCONTINUED | OUTPATIENT
Start: 2021-06-28 | End: 2021-06-30

## 2021-06-28 RX ORDER — POTASSIUM CHLORIDE 20 MEQ
20 PACKET (EA) ORAL ONCE
Refills: 0 | Status: COMPLETED | OUTPATIENT
Start: 2021-06-28 | End: 2021-06-28

## 2021-06-28 RX ADMIN — MEROPENEM 200 MILLIGRAM(S): 1 INJECTION INTRAVENOUS at 22:06

## 2021-06-28 RX ADMIN — POLYMYXIN B SULFATE 500 UNIT(S): 500000 INJECTION, POWDER, LYOPHILIZED, FOR SOLUTION INTRAMUSCULAR; INTRATHECAL; INTRAVENOUS; OPHTHALMIC at 05:18

## 2021-06-28 RX ADMIN — Medication 50 MILLIEQUIVALENT(S): at 23:07

## 2021-06-28 RX ADMIN — TAMSULOSIN HYDROCHLORIDE 0.4 MILLIGRAM(S): 0.4 CAPSULE ORAL at 23:08

## 2021-06-28 RX ADMIN — MEROPENEM 200 MILLIGRAM(S): 1 INJECTION INTRAVENOUS at 05:17

## 2021-06-28 RX ADMIN — NYSTATIN CREAM 1 APPLICATION(S): 100000 CREAM TOPICAL at 05:18

## 2021-06-28 RX ADMIN — Medication 60 MILLIGRAM(S): at 11:54

## 2021-06-28 RX ADMIN — Medication 20 MILLIGRAM(S): at 05:17

## 2021-06-28 RX ADMIN — CHLORHEXIDINE GLUCONATE 1 APPLICATION(S): 213 SOLUTION TOPICAL at 05:18

## 2021-06-28 RX ADMIN — MEROPENEM 200 MILLIGRAM(S): 1 INJECTION INTRAVENOUS at 13:45

## 2021-06-28 NOTE — PROGRESS NOTE ADULT - SUBJECTIVE AND OBJECTIVE BOX
TISH SHAIKH 65y Male  MRN#: [id number]   Hospital Day: 11d    SUBJECTIVE  Patient is a 65y old Male who presents with a chief complaint of Nephrolithiasis (28 Jun 2021 12:08)  Currently admitted to medicine with the primary diagnosis of resolving urosepsis 2/2 obstructive nephrolithiasis. Planned to undergo IR procedure to convert nephrostomy to nephroureteral stent on 6/28, and PCNL on 6/29.       INTERVAL HPI AND OVERNIGHT EVENTS:  Patient was examined and seen at bedside. This morning he is resting comfortably in bed and reports no issues or overnight events. He reports that he feels well.     REVIEW OF SYMPTOMS:  CONSTITUTIONAL: Denies fever, chills  RESPIRATORY: No cough, SOB  CARDIOVASCULAR: No chest pain or palpitations  GASTROINTESTINAL: No abdominal pain; no changes in bowel movements  GENITOURINARY: No dysuria or pain at alatorre site  NEUROLOGICAL: No numbness or weakness    OBJECTIVE  PAST MEDICAL & SURGICAL HISTORY  BPH (benign prostatic hyperplasia)    Cerebral palsy    Seizure  last seizure &gt;10 years ago    Osteoporosis    Spastic quadriplegia    Urinary calculi    Urinary retention    Asthma    S/P percutaneous endoscopic gastrostomy (PEG) tube placement    H/O cystoscopy    Suprapubic catheter      ALLERGIES:  No Known Allergies    MEDICATIONS:  STANDING MEDICATIONS  chlorhexidine 4% Liquid 1 Application(s) Topical <User Schedule>  meropenem  IVPB 2000 milliGRAM(s) IV Intermittent every 8 hours  nystatin Powder 1 Application(s) Topical two times a day  PHENobarbital Injectable 60 milliGRAM(s) IV Push daily  polymyxin B IVPB 960330 Unit(s) IV Intermittent every 12 hours  tamsulosin 0.4 milliGRAM(s) Oral at bedtime    PRN MEDICATIONS  acetaminophen  Suppository .. 650 milliGRAM(s) Rectal every 6 hours PRN  ALBUTerol    90 MICROgram(s) HFA Inhaler 2 Puff(s) Inhalation every 6 hours PRN      PHYSICAL EXAM:  Constitutional: pt is comfortable lying/sitting in bed; no acute distress  HEENT: Full ROM in bilateral eyes; pupils symmetrical and equal in size; poor dentition  Respiratory: (+) sounds in all lung fields; no crackles or wheezes appreciated  Cardiovascular: S1 S2 regular rate and rhythm; no murmurs or gallops appreciated  Gastrointestinal: (+) bowel sounds in all quadrants; abdomen is non-distended, non-tender to superficial and deep palpation  Genitourinary: Alatorre Site is clean; Nephrostomy drain site is clean and non-erythematous  Extremities: extremities are non-edematous  Vascular: Warm and Well perfused UE and LE; no mottling or dusky skin   Neurological: all extremities are contracted  Skin: no rashes or ulcerations noted; no scaling present      VITAL SIGNS: Last 24 Hours  T(C): 36.1 (28 Jun 2021 12:53), Max: 36.1 (28 Jun 2021 12:53)  T(F): 97 (28 Jun 2021 12:53), Max: 97 (28 Jun 2021 12:53)  HR: 81 (28 Jun 2021 12:53) (81 - 93)  BP: 88/57 (28 Jun 2021 12:53) (88/57 - 93/55)  BP(mean): 67 (28 Jun 2021 12:53) (67 - 68)  RR: 18 (28 Jun 2021 12:53) (18 - 18)  SpO2: 94% (28 Jun 2021 07:00) (94% - 94%)    LABS:                        11.5   9.59  )-----------( 247      ( 27 Jun 2021 06:30 )             33.6     06-27    138  |  93<L>  |  20  ----------------------------<  61<L>  3.3<L>   |  29  |  0.6<L>    Ca    8.7      27 Jun 2021 06:30  Mg     1.4     06-27    TPro  5.7<L>  /  Alb  3.0<L>  /  TBili  0.3  /  DBili  x   /  AST  22  /  ALT  18  /  AlkPhos  69  06-27    PT/INR - ( 27 Jun 2021 06:30 )   PT: 13.90 sec;   INR: 1.21 ratio         PTT - ( 27 Jun 2021 06:30 )  PTT:29.2 sec      RADIOLOGY:  < from: Xray Chest 1 View- PORTABLE-Routine (Xray Chest 1 View- PORTABLE-Routine in AM.) (06.26.21 @ 06:15) >  Impression:    No radiographic evidence of acute cardiopulmonary disease.    < end of copied text >

## 2021-06-28 NOTE — PROGRESS NOTE ADULT - SUBJECTIVE AND OBJECTIVE BOX
Patient is a 65y old  Male who presents with a chief complaint of Nephrolithiasis (28 Jun 2021 09:25)        Over Night Events:        ROS:     All ROS are negative except HPI         PHYSICAL EXAM    ICU Vital Signs Last 24 Hrs  T(C): --  T(F): --  HR: 93 (28 Jun 2021 07:00) (93 - 93)  BP: 93/55 (28 Jun 2021 07:00) (93/55 - 93/55)  BP(mean): 68 (28 Jun 2021 07:00) (68 - 68)  ABP: --  ABP(mean): --  RR: 18 (28 Jun 2021 07:00) (18 - 18)  SpO2: 94% (28 Jun 2021 07:00) (94% - 94%)      CONSTITUTIONAL:  ill appearing  NAD    ENT:   Airway patent,   Mouth with normal mucosa.   No thrush    EYES:   Pupils equal,   Round and reactive to light.    CARDIAC:   Normal rate,   Regular rhythm.    No edema      RESPIRATORY:   No wheezing  Bilateral BS  Normal chest expansion  Not tachypneic,  No use of accessory muscles    GASTROINTESTINAL:  Abdomen soft,   Non-tender,   No guarding,     MUSCULOSKELETAL:   contracted    NEUROLOGICAL:   Alert and oriented       SKIN:   Skin normal color for race,   Warm and dry and intact.   No evidence of rash.    PSYCHIATRIC:   Normal mood and affect.   No apparent risk to self or others.    HEMATOLOGICAL:  No cervical  lymphadenopathy.  no inguinal lymphadenopathy      06-27-21 @ 07:01  -  06-28-21 @ 07:00  --------------------------------------------------------  IN:  Total IN: 0 mL    OUT:    Indwelling Catheter - Suprapubic (mL): 380 mL    Nephrostomy Tube (mL): 800 mL    Stool (mL): 1 mL    Voided (mL): 200 mL  Total OUT: 1381 mL    Total NET: -1381 mL          LABS:                            11.5   9.59  )-----------( 247      ( 27 Jun 2021 06:30 )             33.6                                               06-27    138  |  93<L>  |  20  ----------------------------<  61<L>  3.3<L>   |  29  |  0.6<L>    Ca    8.7      27 Jun 2021 06:30  Mg     1.4     06-27    TPro  5.7<L>  /  Alb  3.0<L>  /  TBili  0.3  /  DBili  x   /  AST  22  /  ALT  18  /  AlkPhos  69  06-27      PT/INR - ( 27 Jun 2021 06:30 )   PT: 13.90 sec;   INR: 1.21 ratio         PTT - ( 27 Jun 2021 06:30 )  PTT:29.2 sec                                                                                     LIVER FUNCTIONS - ( 27 Jun 2021 06:30 )  Alb: 3.0 g/dL / Pro: 5.7 g/dL / ALK PHOS: 69 U/L / ALT: 18 U/L / AST: 22 U/L / GGT: x                                                                                                                                       MEDICATIONS  (STANDING):  chlorhexidine 4% Liquid 1 Application(s) Topical <User Schedule>  meropenem  IVPB 2000 milliGRAM(s) IV Intermittent every 8 hours  methylPREDNISolone sodium succinate Injectable 20 milliGRAM(s) IV Push daily  nystatin Powder 1 Application(s) Topical two times a day  PHENobarbital Injectable 60 milliGRAM(s) IV Push daily  polymyxin B IVPB [id number] Unit(s) IV Intermittent every 12 hours  tamsulosin 0.4 milliGRAM(s) Oral at bedtime    MEDICATIONS  (PRN):  acetaminophen  Suppository .. 650 milliGRAM(s) Rectal every 6 hours PRN Temp greater or equal to 38C (100.4F)  ALBUTerol    90 MICROgram(s) HFA Inhaler 2 Puff(s) Inhalation every 6 hours PRN Bronchospasm      New X-rays reviewed:                                                                                  ECHO    CXR interpreted by me:       Patient is a 65y old  Male who presents with a chief complaint of Nephrolithiasis (28 Jun 2021 09:25)        Over Night Events: NO overnight events         ROS:     All ROS are negative except HPI         PHYSICAL EXAM    ICU Vital Signs Last 24 Hrs  T(C): --  T(F): --  HR: 93 (28 Jun 2021 07:00) (93 - 93)  BP: 93/55 (28 Jun 2021 07:00) (93/55 - 93/55)  BP(mean): 68 (28 Jun 2021 07:00) (68 - 68)  ABP: --  ABP(mean): --  RR: 18 (28 Jun 2021 07:00) (18 - 18)  SpO2: 94% (28 Jun 2021 07:00) (94% - 94%)      CONSTITUTIONAL:  ill appearing  NAD    ENT:   Airway patent,   Mouth with normal mucosa.   No thrush    EYES:   Pupils equal,   Round and reactive to light.    CARDIAC:   Normal rate,   Regular rhythm.    No edema      RESPIRATORY:   No wheezing  Bilateral BS  Normal chest expansion  Not tachypneic,  No use of accessory muscles    GASTROINTESTINAL:  Abdomen soft,   Non-tender,   No guarding,     MUSCULOSKELETAL:   contracted    NEUROLOGICAL:   Alert  Does not follow commands       SKIN:   Skin normal color for race,   Warm and dry    No evidence of rash.    PSYCHIATRIC:   Normal mood and affect.   No apparent risk to self or others.    HEMATOLOGICAL:  No cervical  lymphadenopathy.  no inguinal lymphadenopathy      06-27-21 @ 07:01  -  06-28-21 @ 07:00  --------------------------------------------------------  IN:  Total IN: 0 mL    OUT:    Indwelling Catheter - Suprapubic (mL): 380 mL    Nephrostomy Tube (mL): 800 mL    Stool (mL): 1 mL    Voided (mL): 200 mL  Total OUT: 1381 mL    Total NET: -1381 mL          LABS:                            11.5   9.59  )-----------( 247      ( 27 Jun 2021 06:30 )             33.6                                               06-27    138  |  93<L>  |  20  ----------------------------<  61<L>  3.3<L>   |  29  |  0.6<L>    Ca    8.7      27 Jun 2021 06:30  Mg     1.4     06-27    TPro  5.7<L>  /  Alb  3.0<L>  /  TBili  0.3  /  DBili  x   /  AST  22  /  ALT  18  /  AlkPhos  69  06-27      PT/INR - ( 27 Jun 2021 06:30 )   PT: 13.90 sec;   INR: 1.21 ratio         PTT - ( 27 Jun 2021 06:30 )  PTT:29.2 sec                                                                                     LIVER FUNCTIONS - ( 27 Jun 2021 06:30 )  Alb: 3.0 g/dL / Pro: 5.7 g/dL / ALK PHOS: 69 U/L / ALT: 18 U/L / AST: 22 U/L / GGT: x                                                                                                                                       MEDICATIONS  (STANDING):  chlorhexidine 4% Liquid 1 Application(s) Topical <User Schedule>  meropenem  IVPB 2000 milliGRAM(s) IV Intermittent every 8 hours  methylPREDNISolone sodium succinate Injectable 20 milliGRAM(s) IV Push daily  nystatin Powder 1 Application(s) Topical two times a day  PHENobarbital Injectable 60 milliGRAM(s) IV Push daily  polymyxin B IVPB 927144 Unit(s) IV Intermittent every 12 hours  tamsulosin 0.4 milliGRAM(s) Oral at bedtime    MEDICATIONS  (PRN):  acetaminophen  Suppository .. 650 milliGRAM(s) Rectal every 6 hours PRN Temp greater or equal to 38C (100.4F)  ALBUTerol    90 MICROgram(s) HFA Inhaler 2 Puff(s) Inhalation every 6 hours PRN Bronchospasm      New X-rays reviewed:                                                                                  ECHO    CXR interpreted by me:  No infiltrate

## 2021-06-28 NOTE — PROGRESS NOTE ADULT - SUBJECTIVE AND OBJECTIVE BOX
INTERVENTIONAL RADIOLOGY BRIEF-OPERATIVE NOTE    Procedure:    1.  Right nephrostogram followed by exchange for right nephroureteral stent  2.  Flushed gastrostomy tube    Pre-Op Diagnosis:    1.  Proximal right ureteral obstruction  2.  Obstructed G-Tube    Post-Op Diagnosis:  1.  No right ureteral obstruction; at least one calculus migrated into upper pole, right renal collecting system.  2.  No G-Tube obstruction.    Attending:  Temo   Resident:   None    Anesthesia (type):  [ ] General Anesthesia  [ ] Sedation  [ ] Spinal Anesthesia  [X] Local/Regional-- 1% Lidocaine, SQ, right flank, 1 cc    Contrast:  Visipaque, 15 cc to right renal pelvis/bladder, drained    Estimated Blood Loss:  0 cc    Condition:   [ ] Critical  [ ] Serious  [ ] Fair   [X] Good    Findings/Follow up Plan of Care:    1.  Proximal right ureteral obstruction no longer seen.  At least one of two obstructing proximal right ureteral calculi was observed in the upper pole, right renal collecting system.  8-Sami 22 cm right nephroureteral stent placed uneventfully.  2.  G-tube obstruction cleared on catheter manipulation/flush.  Recommend G-tube evaluation w/ contrast, followed by abdominal x-ray PRIOR  to using the tube.  *NB-- NPO after midnight tonight-- patient for OR tomorrow.    Specimens Removed:  None    Implants:  None    Complications:  None immediate    Disposition:  Back to floor.  NPO after midnight tonight:  For OR tomorrow.      Please call Interventional Radiology [phone] with any questions, concerns, or issues.

## 2021-06-28 NOTE — CONSULT NOTE ADULT - ASSESSMENT
IMPRESSION:  #Septic shock requiring pressors secondary to Polymicrobial bacteremia with Pseudomonas and VRE secondary to pyelonephritis secondary to obstructing stone s/p SPC exchange and R PCN  #H/o Seizures  #H/o Cerebral palsy with chronic contractures   #No cardiac history     Functional capacity:   Less than 4 METS due to neurological status, bedbound at baseline. BL UE contractures.     High-risk patient features:  - Recent (<30 days) or active MI          Y [] / N [X]  - Unstable or severe angina          Y [] / N [X]  - Decompensated heart failure, or worsening or new-onset heart failure          Y [] / N [X]  - Severe valvular disease          Y [] / N [X]  - Significant arrhythmia (Tachy- or Bradyarrhythmia)          Y [] / N [X]    Type of surgery/procedure:    Moderate/Intermediate risk procedure     * Revised Cardiac Risk Index (RCRI)  1- History of ischemic heart disease          Y [] / N [X]  2- History of congestive heart failure          Y [] / N [X]  3- History of stroke/TIA          Y [] / N [X]  4- History of insulin-dependent diabetes          Y [] / N [X]  5- Chronic kidney disease (Cr >2mg/dL)          Y [] / N [X]  6- Undergoing suprainguinal vascular, intraperitoneal, or intrathoracic surgery          Y [] / N [X]  According to RCRI, Class I risk --> 3.9% (30-day risk of death, MI, or cardiac arrest)    RECOMMENDATIONS:  Moderate cardiovascular risk for Moderate risk procedure due to poor functional capacity.   No further cardiac work-up is needed at the moment.     - This consult serves only as a karlie-operative cardiac risk stratification and evaluation to predict 30-days cardiac complications risk and mortality. The decision to proceed with the surgery/procedure is made by the performing physician and the patient -    ****THIS IS AN INCOMPLETE NOTE. FINAL RECOMMENDATIONS TO FOLLOW AFTER DISCUSSION WITH THE ATTENDING**** IMPRESSION:  #Septic shock requiring pressors secondary to Polymicrobial bacteremia with Pseudomonas and VRE secondary to pyelonephritis secondary to obstructing stone s/p SPC exchange and R PCN  #H/o Seizures  #H/o Cerebral palsy with chronic contractures   #No cardiac history     Functional capacity:   Less than 4 METS due to neurological status, bedbound at baseline. BL UE contractures.     High-risk patient features:  - Recent (<30 days) or active MI          Y [] / N [X]  - Unstable or severe angina          Y [] / N [X]  - Decompensated heart failure, or worsening or new-onset heart failure          Y [] / N [X]  - Severe valvular disease          Y [] / N [X]  - Significant arrhythmia (Tachy- or Bradyarrhythmia)          Y [] / N [X]    Type of surgery/procedure:    Moderate/Intermediate risk procedure     * Revised Cardiac Risk Index (RCRI)  1- History of ischemic heart disease          Y [] / N [X]  2- History of congestive heart failure          Y [] / N [X]  3- History of stroke/TIA          Y [] / N [X]  4- History of insulin-dependent diabetes          Y [] / N [X]  5- Chronic kidney disease (Cr >2mg/dL)          Y [] / N [X]  6- Undergoing suprainguinal vascular, intraperitoneal, or intrathoracic surgery          Y [] / N [X]  According to RCRI, Class I risk --> 3.9% (30-day risk of death, MI, or cardiac arrest)    RECOMMENDATIONS:  Moderate cardiovascular risk for Moderate risk procedure due to poor functional capacity.       - This consult serves only as a karlie-operative cardiac risk stratification and evaluation to predict 30-days cardiac complications risk and mortality. The decision to proceed with the surgery/procedure is made by the performing physician and the patient -    ****THIS IS AN INCOMPLETE NOTE. FINAL RECOMMENDATIONS TO FOLLOW AFTER DISCUSSION WITH THE ATTENDING**** IMPRESSION:  #Septic shock requiring pressors secondary to Polymicrobial bacteremia with Pseudomonas and VRE secondary to pyelonephritis secondary to obstructing stone s/p SPC exchange and R PCN  #H/o Seizures  #H/o Cerebral palsy with chronic contractures   #No cardiac history     Functional capacity:   Less than 4 METS due to neurological status, bedbound at baseline.     High-risk patient features:  - Recent (<30 days) or active MI          Y [] / N [X]  - Unstable or severe angina          Y [] / N [X]  - Decompensated heart failure, or worsening or new-onset heart failure          Y [] / N [X]  - Severe valvular disease          Y [] / N [X]  - Significant arrhythmia (Tachy- or Bradyarrhythmia)          Y [] / N [X]    Type of surgery/procedure:   Intermediate/low risk procedure     * Revised Cardiac Risk Index (RCRI)  1- History of ischemic heart disease          Y [] / N [X]  2- History of congestive heart failure          Y [] / N [X]  3- History of stroke/TIA          Y [] / N [X]  4- History of insulin-dependent diabetes          Y [] / N [X]  5- Chronic kidney disease (Cr >2mg/dL)          Y [] / N [X]  6- Undergoing suprainguinal vascular, intraperitoneal, or intrathoracic surgery          Y [] / N [X]  According to RCRI, Class I risk --> 3.9% (30-day risk of death, MI, or cardiac arrest)    RECOMMENDATIONS:  Moderate cardiovascular risk for low risk procedure.  No further cardiac work up indicated at this time.     - This consult serves only as a karlie-operative cardiac risk stratification and evaluation to predict 30-days cardiac complications risk and mortality. The decision to proceed with the surgery/procedure is made by the performing physician and the patient -   IMPRESSION:  #Septic shock requiring pressors secondary to Polymicrobial bacteremia with Pseudomonas and VRE secondary to pyelonephritis secondary to obstructing stone s/p SPC exchange and R PCN  #H/o Seizures  #H/o Cerebral palsy with chronic contractures   #No cardiac history     Functional capacity:   Less than 4 METS due to neurological status, bedbound at baseline.    High-risk patient features:  - Recent (<30 days) or active MI          Y [] / N [X]  - Unstable or severe angina          Y [] / N [X]  - Decompensated heart failure, or worsening or new-onset heart failure          Y [] / N [X]  - Severe valvular disease          Y [] / N [X]  - Significant arrhythmia (Tachy- or Bradyarrhythmia)          Y [] / N [X]    Type of surgery/procedure:   Intermediate/low risk procedure     * Revised Cardiac Risk Index (RCRI)  1- History of ischemic heart disease          Y [] / N [X]  2- History of congestive heart failure          Y [] / N [X]  3- History of stroke/TIA          Y [] / N [X]  4- History of insulin-dependent diabetes          Y [] / N [X]  5- Chronic kidney disease (Cr >2mg/dL)          Y [] / N [X]  6- Undergoing suprainguinal vascular, intraperitoneal, or intrathoracic surgery          Y [] / N [X]    According to RCRI, Class I risk --> 3.9% (30-day risk of death, MI, or cardiac arrest)      RECOMMENDATIONS:  Moderate cardiovascular risk for low risk procedure  No further cardiac work up indicated at this time.

## 2021-06-28 NOTE — PROGRESS NOTE ADULT - ASSESSMENT
HOSPITAL COURSE:    ASSESSMENT & PLAN:  Patient is a     PAST MEDICAL & SURGICAL HISTORY:  BPH (benign prostatic hyperplasia)    Cerebral palsy    Seizure  last seizure &gt;10 years ago    Osteoporosis    Spastic quadriplegia    Urinary calculi    Urinary retention    Asthma    S/P percutaneous endoscopic gastrostomy (PEG) tube placement    H/O cystoscopy    Suprapubic catheter        #Misc  - DVT Prophylaxis:  - GI Prophylaxis:  - Diet:  - Activity:  - IV Fluids:  - Code Status:    Dispo:  - cardio clearance

## 2021-06-28 NOTE — CONSULT NOTE ADULT - CONSULT REASON
Pt is scheduled to undergo R nephroureteral stent today 6/27, with PCNL procedure 6/28. Pt is scheduled to undergo R nephroureteral stent today with PCNL procedure tomorrow

## 2021-06-28 NOTE — PROGRESS NOTE ADULT - SUBJECTIVE AND OBJECTIVE BOX
TISH SHAIKH 65y Male  MRN#: [id number]   Hospital Day: 11d    SUBJECTIVE  Patient is a 65y old Male who presents with a chief complaint of Nephrolithiasis (27 Jun 2021 09:14)  Currently admitted to medicine with the primary diagnosis of Kidney stone      INTERVAL HPI AND OVERNIGHT EVENTS:  Patient was examined and seen at bedside. This morning he is resting comfortably in bed and reports no issues or overnight events.    REVIEW OF SYMPTOMS:  CONSTITUTIONAL: denies fever, chills  RESPIRATORY: Denies cough, sputum production  CARDIOVASCULAR: Denies chest pain  GASTROINTESTINAL: Denies changes in bowel movements; denies abdominal pain  GENITOURINARY: Denies pain at alatorre site    OBJECTIVE  PAST MEDICAL & SURGICAL HISTORY  BPH (benign prostatic hyperplasia)    Cerebral palsy    Seizure  last seizure &gt;10 years ago    Osteoporosis    Spastic quadriplegia    Urinary calculi    Urinary retention    Asthma    S/P percutaneous endoscopic gastrostomy (PEG) tube placement    H/O cystoscopy    Suprapubic catheter      ALLERGIES:  No Known Allergies    MEDICATIONS:  STANDING MEDICATIONS  chlorhexidine 4% Liquid 1 Application(s) Topical <User Schedule>  meropenem  IVPB 2000 milliGRAM(s) IV Intermittent every 8 hours  methylPREDNISolone sodium succinate Injectable 20 milliGRAM(s) IV Push daily  nystatin Powder 1 Application(s) Topical two times a day  PHENobarbital Injectable 60 milliGRAM(s) IV Push daily  polymyxin B IVPB 640327 Unit(s) IV Intermittent every 12 hours  tamsulosin 0.4 milliGRAM(s) Oral at bedtime    PRN MEDICATIONS  acetaminophen  Suppository .. 650 milliGRAM(s) Rectal every 6 hours PRN  ALBUTerol    90 MICROgram(s) HFA Inhaler 2 Puff(s) Inhalation every 6 hours PRN      PHYSICAL EXAM:  PHYSICAL EXAM:  Constitutional: pt is comfortable lying/sitting in bed; no acute distress  HEENT: Full ROM in bilateral eyes; poor dentition   Respiratory: (+) sounds in all lung fields; no crackles or wheezes appreciated  Cardiovascular: S1 S2 regular rate and rhythm; no murmurs or gallops appreciated  Gastrointestinal: (+) bowel sounds in all quadrants; abdomen is non-distended, non-tender to superficial and deep palpation  Genitourinary: Alatorre Site is clean; Non-tender over location of percutaneous nephrostomy tube  Extremities: extremities are non-edematous  Vascular: Warm and Well perfused UE and LE; no mottling or dusky skin   Skin: no raches or ulcerations noted; no scaling present        VITAL SIGNS: Last 24 Hours  T(C): 36.6 (27 Jun 2021 08:00), Max: 36.6 (27 Jun 2021 08:00)  T(F): 97.8 (27 Jun 2021 08:00), Max: 97.8 (27 Jun 2021 08:00)  HR: 79 (27 Jun 2021 08:00) (79 - 79)  BP: 87/54 (27 Jun 2021 08:00) (87/54 - 87/54)  BP(mean): 65 (27 Jun 2021 08:00) (65 - 65)  RR: 18 (27 Jun 2021 08:00) (18 - 18)  SpO2: 100% (27 Jun 2021 08:00) (100% - 100%)    LABS:                        11.5   9.59  )-----------( 247      ( 27 Jun 2021 06:30 )             33.6     06-27    138  |  93<L>  |  20  ----------------------------<  61<L>  3.3<L>   |  29  |  0.6<L>    Ca    8.7      27 Jun 2021 06:30  Mg     1.4     06-27    TPro  5.7<L>  /  Alb  3.0<L>  /  TBili  0.3  /  DBili  x   /  AST  22  /  ALT  18  /  AlkPhos  69  06-27    PT/INR - ( 27 Jun 2021 06:30 )   PT: 13.90 sec;   INR: 1.21 ratio         PTT - ( 27 Jun 2021 06:30 )  PTT:29.2 sec              RADIOLOGY:

## 2021-06-28 NOTE — PROGRESS NOTE ADULT - ASSESSMENT
IMPRESSION:    Sepsis present on admission  Septic shock- resolved  Obstructive pyelonephritis s/p Right PCN/ hematuria-improving, going for stent today  Pseudomonas & E. faecalis VRE bacteremia  Chronic suprapubic alatorre  Seizure yesterday? HO Seizures  HO Cerebral palsy chronically contracted      PLAN:    CNS:  Avoid CNS depressants.     HEENT:  Oral care.  Aspiration precautions.    PULMONARY:  HOB @ 45 degrees.  Wean O2 as tolerated, Goal 88 to 94%, NC, D/C steroids    CARDIOVASCULAR:  IVF while NPO.    GI: GI prophylaxis. feeding after procedure then npo after midnight    RENAL: FU lytes.  Correct as needed.  Monitor UO.  Monitor nephrostomy drain. for stent placement today and nephrolithiasis tomorrow    INFECTIOUS DISEASE:  ABX PER ID    HEMATOLOGICAL:  DVT prophylaxis.     ENDOCRINE:  Follow up FS.  Insulin protocol if needed.    MUSCULOSKELETAL: bed rest      DISPOSITION: SDU   IMPRESSION:    Sepsis present on admission  Septic shock resolved  Obstructive pyelonephritis s/p Right PCN/ hematuria-improving, going for stent today  Pseudomonas & E. faecalis VR bacteremia  Chronic suprapubic alatorre  Seizure yesterday? HO Seizures  HO Cerebral palsy chronically contracted      PLAN:    CNS:  Avoid CNS depressants.     HEENT:  Oral care.  Aspiration precautions.    PULMONARY:  HOB @ 45 degrees.  Wean O2 as tolerated, Goal 94%, NC, D/C steroids    CARDIOVASCULAR:  IVF while NPO.    GI: GI prophylaxis. feeding after procedure then npo after midnight    RENAL: FU lytes.  Correct as needed.  Monitor UO.  Monitor nephrostomy drain. for stent placement today and OR in am     INFECTIOUS DISEASE:  ABX PER ID    HEMATOLOGICAL:  DVT prophylaxis.     ENDOCRINE:  Follow up FS.  Insulin protocol if needed.    MUSCULOSKELETAL: bed rest      DISPOSITION: SDU

## 2021-06-28 NOTE — CONSULT NOTE ADULT - CONSULT REQUESTED DATE/TIME
19-Jun-2021 09:56
28-Jun-2021 09:25
18-Jun-2021 07:15
17-Jun-2021 21:01
18-Jun-2021 13:12
19-Jun-2021 00:00

## 2021-06-28 NOTE — CONSULT NOTE ADULT - ATTENDING COMMENTS
Pt examined this afternoon.  Is awake and responsive, follows commands.  Suspected seizure.    Please keep on seizure precautions, and check phenobarbital level and rEEG.
I personally saw and examined the patient, reviewed the chart and available data. I discussed the situation with the patient and PA staff. I also reviewed and/or amended the note as necessary.
Type 2 MI secondary to demand ischemia in Septic shock now resolved  Multiple comorbidities  Bedbound    CXR (6/26):  No CHF  Echo (6/21):  EF 60-65%, no valvular heart disease      IMPRESSION & RECOMMENDATIONS:  Moderate-risk for low-risk procedure    No further cardiac workup is indicated at this time.  There are no current cardiac contraindications that prohibit proceeding with the scheduled surgery/procedure.  This consult serves only as a perioperative cardiac risk stratification and evaluation to predict 30-day cardiac complications risk and mortality.  The decision to proceed with the surgery/procedure is made by the performing physician and the patient.

## 2021-06-28 NOTE — PROGRESS NOTE ADULT - ASSESSMENT
ASSESSMENT  64 years old Male with PMHx of Cerebral Palsy, Seizure disorder, spastic paraplegia s/p PEG, BPH, Hx of Nephrolithiasis s/p pcnl x 2 with known incompetent bladder neck s/p SPT presents to ED with Right sided flank pain. Patient had Right-sided PCNL 5/20.    IMPRESSION  #Septic shock requiring pressors secondary to Polymicrobial bacteremia with Pseudomonas and VRE secondary to pyelonephritis secondary to obstructing stone    s/p SPT exchange and R PCN    6/22 BCX NGTD     6/21 BCX NGTD     6/19 BCX NGTD     R kidney CX   Numerous Pseudomonas aeruginosa (Carbapenem Resistant) Multiple Morphological Strains    Numerous Proteus mirabilis ESBL    Numerous Staphylococcus aureus- MSSA    Numerous Actinotignum schaali group "Susceptibilities not performed"    6/18 BCX Pseudomonas, Enterococcus faecalis VRE    UA  WBC 10     CTAP 1.  Obstructing proximal right ureteral stone with moderate hydroureteronephrosis.  2.  Bladder wall thickening. Correlate with urinalysis.  #CT Bibasilar atelectasis.  Creatinine, Serum: 0.8 (06-19-21 @ 07:11)    Weight (kg): 57.4 (06-18-21 @ 05:00)    RECOMMENDATIONS  - Hypokalemia likely secondary to polymyxin, monitor closely   - Meropenem 2g q8h IV  - Polymyxin 700,000 units q12h IV. monitor Cr & lytes 6/22-  - Appreciate Urology consult- s/p SPT exchange 6/18  - Appreciate IR consult: s/p Percutaneous right nephrostomy  - Cleared from ID standpoint for any procedures      If any questions, please call or send a message on [employer] Teams  Please continue to update ID with any pertinent new laboratory or radiographic findings  Spectra [id number]

## 2021-06-28 NOTE — PROGRESS NOTE ADULT - SUBJECTIVE AND OBJECTIVE BOX
TISH SHAIKH  65y, Male  Allergy: No Known Allergies      LOS  11d    CHIEF COMPLAINT: Nephrolithiasis (28 Jun 2021 09:41)      INTERVAL EVENTS/HPI  - No acute events overnight  - T(F): --  - Denies any worsening symptoms  - Tolerating medication  - WBC Count: 9.59 (06-27-21 @ 06:30)  WBC Count: 9.92 (06-26-21 @ 04:30)     - Creatinine, Serum: 0.6 (06-27-21 @ 06:30)       ROS  General: Denies rigors, nightsweats  HEENT: Denies headache, rhinorrhea, sore throat, eye pain  CV: Denies CP, palpitations  PULM: Denies wheezing, hemoptysis  GI: Denies hematemesis, hematochezia, melena  : Denies discharge, hematuria  MSK: Denies arthralgias, myalgias  SKIN: Denies rash, lesions  NEURO: Denies paresthesias, weakness  PSYCH: Denies depression, anxiety    VITALS:  T(F): --  HR: 93  BP: 93/55  RR: 18Vital Signs Last 24 Hrs  T(C): --  T(F): --  HR: 93 (28 Jun 2021 07:00) (93 - 93)  BP: 93/55 (28 Jun 2021 07:00) (93/55 - 93/55)  BP(mean): 68 (28 Jun 2021 07:00) (68 - 68)  RR: 18 (28 Jun 2021 07:00) (18 - 18)  SpO2: 94% (28 Jun 2021 07:00) (94% - 94%)    PHYSICAL EXAM:  Gen: NAD, resting in bed  chronically ill appearing   HEENT: Normocephalic, atraumatic  Neck: supple, no lymphadenopathy  CV: Regular rate & regular rhythm  Lungs: decreased BS at bases, no fremitus  Abdomen: Soft, BS present PEG PCN  Ext: Warm, well perfused  Neuro: non focal, awake  Skin: no rash, no erythema  Lines: no phlebitis    FH: Non-contributory  Social Hx: Non-contributory    TESTS & MEASUREMENTS:                        11.5   9.59  )-----------( 247      ( 27 Jun 2021 06:30 )             33.6     06-27    138  |  93<L>  |  20  ----------------------------<  61<L>  3.3<L>   |  29  |  0.6<L>    Ca    8.7      27 Jun 2021 06:30  Mg     1.4     06-27    TPro  5.7<L>  /  Alb  3.0<L>  /  TBili  0.3  /  DBili  x   /  AST  22  /  ALT  18  /  AlkPhos  69  06-27      LIVER FUNCTIONS - ( 27 Jun 2021 06:30 )  Alb: 3.0 g/dL / Pro: 5.7 g/dL / ALK PHOS: 69 U/L / ALT: 18 U/L / AST: 22 U/L / GGT: x               Culture - Blood (collected 06-25-21 @ 04:30)  Source: .Blood Blood-Peripheral  Preliminary Report (06-26-21 @ 18:00):    No growth to date.    Culture - Blood (collected 06-24-21 @ 08:00)  Source: .Blood Blood-Peripheral  Preliminary Report (06-25-21 @ 14:01):    No growth to date.    Culture - Blood (collected 06-24-21 @ 08:00)  Source: .Blood Blood-Arterial  Preliminary Report (06-25-21 @ 14:01):    No growth to date.    Culture - Blood (collected 06-23-21 @ 05:52)  Source: .Blood None  Preliminary Report (06-24-21 @ 19:01):    No growth to date.    Culture - Urine (collected 06-22-21 @ 16:00)  Source: Kidney Nephrostomy - Right  Final Report (06-24-21 @ 17:15):    No growth at 48 hours    Culture - Blood (collected 06-22-21 @ 06:22)  Source: .Blood None  Final Report (06-27-21 @ 13:00):    No Growth Final    Culture - Blood (collected 06-21-21 @ 07:17)  Source: .Blood None  Final Report (06-26-21 @ 19:00):    No Growth Final    Culture - Blood (collected 06-20-21 @ 19:24)  Source: .Blood Blood-Peripheral  Final Report (06-26-21 @ 01:00):    No Growth Final    Culture - Urine (collected 06-18-21 @ 14:23)  Source: Kidney Right Kidney  Final Report (06-22-21 @ 08:46):    Numerous Pseudomonas aeruginosa (Carbapenem Resistant) Multiple    Morphological Strains    Numerous Proteus mirabilis ESBL    Numerous Staphylococcus aureus    Numerous Actinotignum schaali group "Susceptibilities not performed"  Organism: Pseudomonas aeruginosa (Carbapenem Resistant)  Proteus mirabilis ESBL  Enterococcus faecalis  Staphylococcus aureus (06-22-21 @ 08:46)  Organism: Staphylococcus aureus (06-22-21 @ 08:46)      -  Ampicillin/Sulbactam: S <=8/4      -  Cefazolin: S <=4      -  Gentamicin: S <=1 Should not be used as monotherapy      -  Oxacillin: S 1      -  Penicillin: R >8      -  RIF- Rifampin: S <=1 Should not be used as monotherapy      -  Tetra/Doxy: S <=1      -  Trimethoprim/Sulfamethoxazole: S <=0.5/9.5      -  Vancomycin: S 2      Method Type: MIGUEL A  Organism: Enterococcus faecalis (06-22-21 @ 08:46)      -  Ampicillin: S <=2 Predicts results to ampicillin/sulbactam, amoxacillin-clavulanate and  piperacillin-tazobactam.      -  Ciprofloxacin: S <=1      -  Levofloxacin: S <=1      -  Tetra/Doxy: R >8      -  Vancomycin: S 2      Method Type: MIGUEL A  Organism: Proteus mirabilis ESBL (06-22-21 @ 08:46)      -  Amikacin: S <=16      -  Amoxicillin/Clavulanic Acid: S <=8/4      -  Ampicillin: R >16 These ampicillin results predict results for amoxicillin      -  Ampicillin/Sulbactam: R 8/4 Enterobacter, Citrobacter, and Serratia may develop resistance during prolonged therapy (3-4 days)      -  Aztreonam: R >16      -  Cefazolin: R >16 ([id number]) For uncomplicated UTI with K. pneumoniae, E. coli, or P. mirablis: MIGUEL A <=16 is sensitive and MIGUEL A >=32 is resistant. This also predicts results for oral agents cefaclor, cefdinir, cefpodoxime, cefprozil, cefuroxime axetil, cephalexin and locarbef for uncomplicated UTI. Note that some isolates may be susceptible to these agents while testing resistant to cefazolin.      -  Cefepime: R >16      -  Cefoxitin: S <=8      -  Ceftriaxone: R >32 Enterobacter, Citrobacter, and Serratia may develop resistance during prolonged therapy      -  Ciprofloxacin: R >2      -  Ertapenem: S <=0.5      -  Gentamicin: S <=2      -  Levofloxacin: R >4      -  Meropenem: S <=1      -  Piperacillin/Tazobactam: R <=8      -  Tobramycin: S <=2      -  Trimethoprim/Sulfamethoxazole: R >2/38      Method Type: MIGUEL A  Organism: Pseudomonas aeruginosa (Carbapenem Resistant) (06-22-21 @ 08:46)      -  Amikacin: S <=16      -  Aztreonam: S <=4      -  Cefepime: S 8      -  Ceftazidime: S 8      -  Ciprofloxacin: R >2      -  Gentamicin: S 4      -  Imipenem: R >8      -  Levofloxacin: R >4      -  Meropenem: I 4      -  Piperacillin/Tazobactam: I 32      -  Tobramycin: S <=2      Method Type: MIGUEL A    Culture - Blood (collected 06-18-21 @ 09:29)  Source: .Blood None  Gram Stain (06-19-21 @ 07:48):    Upon re-evaluation of gram stain:    Growth in aerobic and anaerobic bottles: Gram Negative Rods and Gram    Positive Cocci in Pairs and Chains  Final Report (06-22-21 @ 12:03):    Growth in aerobic and anaerobic bottles: Enterococcus faecalis    Growth in aerobic and anaerobic bottles: Pseudomonas aeruginosa    (Carbapenem Resistant)    Growth in anaerobic bottle: Proteus mirabilis ESBL    ***Blood Panel PCR results on this specimenare available    approximately 3 hours after the Gram stain result.***    Gram stain, PCR, and/or culture results may not always    correspond due to difference in methodologies.    ************************************************************    This PCR assaywas performed by multiplex PCR. This    Assay tests for 66 bacterial and resistance gene targets.    Please refer to the [facility] test directory    at [email or web address] for details.  Organism: Blood Culture PCR  Blood Culture PCR  Enterococcus faecalis  Pseudomonas aeruginosa (Carbapenem Resistant)  Proteus mirabilis ESBL (06-22-21 @ 12:07)  Organism: Proteus mirabilis ESBL (06-22-21 @ 12:07)      -  Amikacin: R >32      -  Ampicillin: R >16 These ampicillin results predict results for amoxicillin      -  Ampicillin/Sulbactam: R 8/4 Enterobacter, Citrobacter, and Serratia may develop resistance during prolonged therapy (3-4 days)      -  Aztreonam: R >16      -  Cefazolin: R >16 Enterobacter, Citrobacter, and Serratia may develop resistance during prolonged therapy (3-4 days)      -  Cefepime: R >16      -  Cefoxitin: R >16      -  Ceftazidime/Avibactam: R >16      -  Ceftolozane/tazobactam: S <=2      -  Ceftriaxone: R >32 Enterobacter, Citrobacter, and Serratia may develop resistance during prolonged therapy      -  Ciprofloxacin: R >2      -  Ertapenem: R >1      -  Gentamicin: I 8      -  Levofloxacin: R >4      -  Meropenem: I 2      -  Piperacillin/Tazobactam: R <=8      -  Tobramycin: S <=2      -  Trimethoprim/Sulfamethoxazole: R >2/38      Method Type: MIGUEL A  Organism: Pseudomonas aeruginosa (Carbapenem Resistant) (06-22-21 @ 12:07)      -  Amikacin: S <=16      -  Aztreonam: S 8      -  Cefepime: I 16      -  Ceftazidime: I 16      -  Ciprofloxacin: R >2      -  Gentamicin: S 4      -  Imipenem: R >8      -  Levofloxacin: R >4      -  Meropenem: R 8      -  Piperacillin/Tazobactam: I 64      -  Tobramycin: S <=2      Method Type: MIGUEL A  Organism: Enterococcus faecalis (06-22-21 @ 12:06)      -  Ampicillin: S <=2 Predicts results to ampicillin/sulbactam, amoxacillin-clavulanate and  piperacillin-tazobactam.      -  Gentamicin synergy: S      -  Vancomycin: S 2      Method Type: MIGUEL A  Organism: Blood Culture PCR (06-22-21 @ 12:04)      -  Pseudomonas aeruginosa: Detec      Method Type: PCR  Organism: Blood Culture PCR (06-22-21 @ 12:04)      -  Enterococcus faecalis,VRE: Detec      Method Type: PCR    Culture - Urine (collected 06-17-21 @ 16:27)  Source: .Urine Clean Catch (Midstream)  Final Report (06-21-21 @ 11:36):    >=3 organisms. Probable collection contamination.            INFECTIOUS DISEASES TESTING  COVID-19 PCR: NotDetec (06-27-21 @ 10:29)  COVID-19 PCR: NotDetec (06-17-21 @ 22:22)  Rapid RVP Result: NotDetec (04-01-21 @ 11:14)  COVID-19 PCR: NotDetec (09-14-20 @ 15:45)  COVID-19 PCR: NotDetec (09-08-20 @ 14:19)      INFLAMMATORY MARKERS  C-Reactive Protein, Serum: 120 mg/L (06-18-21 @ 09:29)      RADIOLOGY & ADDITIONAL TESTS:  I have personally reviewed the last available Chest xray  CXR      CT      CARDIOLOGY TESTING  12 Lead ECG:   Ventricular Rate 85 BPM    Atrial Rate 85 BPM    P-R Interval 122 ms    QRS Duration 80 ms    Q-T Interval 380 ms    QTC Calculation(Bazett) 452 ms    P Axis 46 degrees    R Axis 22 degrees    T Axis -10 degrees    Diagnosis Line Normal sinus rhythm  Nonspecific T wave abnormality minor  Otherwise normal ECG    Confirmed by YESICA SALAZAR MD ([id number]) on 6/19/2021 10:46:39 AM (06-19-21 @ 07:53)  12 Lead ECG:   Ventricular Rate 112 BPM    Atrial Rate 112 BPM    P-R Interval 120 ms    QRS Duration 84 ms    Q-T Interval 348 ms    QTC Calculation(Bazett) 475 ms    P Axis 45 degrees    R Axis 36 degrees    T Axis 4 degrees    Diagnosis Line Sinus tachycardia  Otherwise normal ECG    Confirmed by EBENEZER EDDY MD ([id number]) on 6/18/2021 4:15:45 PM (06-18-21 @ 14:35)      MEDICATIONS  chlorhexidine 4% Liquid 1 Topical <User Schedule>  meropenem  IVPB 2000 IV Intermittent every 8 hours  nystatin Powder 1 Topical two times a day  PHENobarbital Injectable 60 IV Push daily  polymyxin B IVPB [id number] IV Intermittent every 12 hours  tamsulosin 0.4 Oral at bedtime      WEIGHT  Weight (kg): 57.4 (06-25-21 @ 03:25)      ANTIBIOTICS:  meropenem  IVPB 2000 milliGRAM(s) IV Intermittent every 8 hours  polymyxin B IVPB [id number] Unit(s) IV Intermittent every 12 hours      All available historical records have been reviewed

## 2021-06-28 NOTE — CONSULT NOTE ADULT - SUBJECTIVE AND OBJECTIVE BOX
HPI:  64 years old Male with PMHx of Cerebral Palsy, Seizure disorder, spastic paraplegia s/p PEG, BPH, Hx of Nephrolithiasis s/p pcnl x 2 with known incompetent bladder neck s/p SPT presents to ED with Right sided flank pain. Patient seen and examined at bedside, History obtained from group home chart. Patient reports feeling flank pain in the afternoon, radiating to RLQ, 10/10 with associated nausea/vomiting. Patient had Right-sided PCNL 5/20. Denies any fever, chills, SOB, CP, dysuria, hematuria. SPT in place draining cloudy yellow urine.     In ED, vitals were WNL, Labs significant for elevated lactate 2.2, UA + for bacteruria, LKE +.  CT A/P shows Delayed right nephrogram. Moderate right hydroureteronephrosis secondary to an obstructing right proximal ureteral stone. Stone size is difficult to measure due to extensive artifact. This stone appears to measure at least 6 mm craniocaudally (max ). He also has a right upper pole stone. Patient received 8mg of Morphine, IVF, Zofran, toradol and cefepime in ED patient reports pain is the same 10/10. To be admitted under medicine.  (18 Jun 2021 01:02)    Cardiology HPI:      PAST MEDICAL & SURGICAL HISTORY  BPH (benign prostatic hyperplasia)    Cerebral palsy    Seizure  last seizure &gt;10 years ago    Osteoporosis    Spastic quadriplegia    Urinary calculi    Urinary retention    Asthma    S/P percutaneous endoscopic gastrostomy (PEG) tube placement    H/O cystoscopy    Suprapubic catheter        FAMILY HISTORY:  FAMILY HISTORY:  No pertinent past medical history of premature cardiovascular disease in first degree relatives     SOCIAL HISTORY:  []smoker  []Alcohol  []Drug    ALLERGIES:  No Known Allergies      MEDICATIONS:  MEDICATIONS  (STANDING):  chlorhexidine 4% Liquid 1 Application(s) Topical <User Schedule>  meropenem  IVPB 2000 milliGRAM(s) IV Intermittent every 8 hours  methylPREDNISolone sodium succinate Injectable 20 milliGRAM(s) IV Push daily  nystatin Powder 1 Application(s) Topical two times a day  PHENobarbital Injectable 60 milliGRAM(s) IV Push daily  polymyxin B IVPB 475266 Unit(s) IV Intermittent every 12 hours  tamsulosin 0.4 milliGRAM(s) Oral at bedtime    MEDICATIONS  (PRN):  acetaminophen  Suppository .. 650 milliGRAM(s) Rectal every 6 hours PRN Temp greater or equal to 38C (100.4F)  ALBUTerol    90 MICROgram(s) HFA Inhaler 2 Puff(s) Inhalation every 6 hours PRN Bronchospasm      HOME MEDICATIONS:  Home Medications:  Albuterol (Eqv-ProAir HFA) 90 mcg/inh inhalation aerosol: 2 puff(s) inhaled every 6 hours (18 Jun 2021 01:48)  albuterol 2.5 mg/3 mL (0.083%) inhalation solution: 3 milliliter(s) inhaled every 6 hours (18 Jun 2021 01:48)  lactulose 10 g/15 mL oral syrup: 15 milliliter(s) orally once a day (18 Jun 2021 01:48)  Milk of Magnesia 8% oral suspension: 15 milliliter(s) orally once a day (at bedtime) (18 Jun 2021 01:48)  PHENobarbital 64.8 mg oral tablet: 1 tab(s) orally once a day via G tube (18 Jun 2021 01:48)      VITALS:   T(F): 97.8 (06-27 @ 08:00), Max: 100.4 (06-25 @ 15:48)  HR: 79 (06-27 @ 08:00) (77 - 98)  BP: 87/54 (06-27 @ 08:00) (86/53 - 108/56)  BP(mean): 65 (06-27 @ 08:00) (64 - 86)  RR: 18 (06-27 @ 08:00) (16 - 20)  SpO2: 100% (06-27 @ 08:00) (96% - 100%)    I&O's Summary    27 Jun 2021 07:01  -  28 Jun 2021 07:00  --------------------------------------------------------  IN: 0 mL / OUT: 1381 mL / NET: -1381 mL        REVIEW OF SYSTEMS:  CONSTITUTIONAL: No weakness, fevers or chills  EYES: No visual changes  ENT: No vertigo or throat pain   NECK: No pain or stiffness  RESPIRATORY: No cough, wheezing, hemoptysis; No shortness of breath  CARDIOVASCULAR: No chest pain or palpitations  GASTROINTESTINAL: No abdominal or epigastric pain. No nausea, vomiting, or hematemesis; No diarrhea or constipation. No melena or hematochezia.  GENITOURINARY: No dysuria, frequency or hematuria  NEUROLOGICAL: No numbness or weakness  SKIN: No itching, no rashes  MSK: no    PHYSICAL EXAM:  NEURO: patient is awake , alert and oriented  GEN: Not in acute distress  NECK: no thyroid enlargement, no JVD  LUNGS: Clear to auscultation bilaterally   CARDIOVASCULAR: S1/S2 present, RRR , no murmurs or rubs, no carotid bruits,  + PP bilaterally  ABD: Soft, non-tender, non-distended, +BS  EXT: No DORIS  SKIN: Intact    LABS:                        11.5   9.59  )-----------( 247      ( 27 Jun 2021 06:30 )             33.6     06-27    138  |  93<L>  |  20  ----------------------------<  61<L>  3.3<L>   |  29  |  0.6<L>    Ca    8.7      27 Jun 2021 06:30  Mg     1.4     06-27    TPro  5.7<L>  /  Alb  3.0<L>  /  TBili  0.3  /  DBili  x   /  AST  22  /  ALT  18  /  AlkPhos  69  06-27    PT/INR - ( 27 Jun 2021 06:30 )   PT: 13.90 sec;   INR: 1.21 ratio         PTT - ( 27 Jun 2021 06:30 )  PTT:29.2 sec      Troponin trend:  Troponin T, Serum in AM (06.19.21 @ 07:11)    Troponin T, Serum: 0.03: Critical value: ng/mL   Troponin T, Serum: 0.05: Critical value: ng/mL (06.19.21 @ 01:49)   Troponin T, Serum: 0.13: Critical value (06.18.21 @ 20:18)   Troponin T, Serum: 0.01 ng/mL (06.18.21 @ 09:29)   06-18 Chol 122 LDL -- HDL 56 Trig 56      RADIOLOGY:  -CXR: < from: Xray Chest 1 View- PORTABLE-Routine (Xray Chest 1 View- PORTABLE-Routine in AM.) (06.26.21 @ 06:15) >  Impression:  No radiographic evidence of acute cardiopulmonary disease.    -TTE:  < from: TTE Echo Complete w/o Contrast w/ Doppler (06.21.21 @ 09:28) >  Summary:   1. Normal global left ventricular systolic function.   2. LV Ejection Fraction by Byrd's Method with a biplane EF of 62 %.   3. Normal left ventricular internal cavity size.   4. The mean global longitudinal peak strain by speckle tracking is -19.9% which is normal.   5. Normal left atrial size.   6. Normal right atrial size.   7. No evidence of mitral valve regurgitation.   8. Mild pulmonic valve regurgitation.   9. LA volume Index is 17.1 ml/m² ml/m2.  10. Peak transaortic gradient equals 4.9 mmHg, mean transaortic gradient equals 2.3 mmHg, the calculated aortic valve area equals 2.48 cm² by the continuity equation consistent with mild aortic stenosis.    -CCTA:  -STRESS TEST:  -CATHETERIZATION:    ECG:    TELEMETRY EVENTS:   HPI:  64 years old Male with PMHx of Cerebral Palsy, Seizure disorder, spastic paraplegia s/p PEG, BPH, Hx of Nephrolithiasis s/p pcnl x 2 with known incompetent bladder neck s/p SPT presents to ED with Right sided flank pain. Patient seen and examined at bedside, History obtained from group home chart. Patient reports feeling flank pain in the afternoon, radiating to RLQ, 10/10 with associated nausea/vomiting. Patient had Right-sided PCNL 5/20. Denies any fever, chills, SOB, CP, dysuria, hematuria. SPT in place draining cloudy yellow urine.   In ED, vitals were WNL, Labs significant for elevated lactate 2.2, UA + for bacteruria, LKE +.  CT A/P shows Delayed right nephrogram. Moderate right hydroureteronephrosis secondary to an obstructing right proximal ureteral stone. Stone size is difficult to measure due to extensive artifact. This stone appears to measure at least 6 mm craniocaudally (max ). He also has a right upper pole stone. Patient received 8mg of Morphine, IVF, Zofran, toradol and cefepime in ED patient reports pain is the same 10/10. To be admitted under medicine.  (18 Jun 2021 01:02)    PAST MEDICAL & SURGICAL HISTORY  BPH (benign prostatic hyperplasia)    Cerebral palsy    Seizure  last seizure &gt;10 years ago    Osteoporosis    Spastic quadriplegia    Urinary calculi    Urinary retention    Asthma    S/P percutaneous endoscopic gastrostomy (PEG) tube placement    H/O cystoscopy    Suprapubic catheter        FAMILY HISTORY:  FAMILY HISTORY:  No pertinent past medical history of premature cardiovascular disease in first degree relatives     SOCIAL HISTORY:  [x]smoker  [x]Alcohol  [x]Drug    ALLERGIES:  No Known Allergies      MEDICATIONS:  MEDICATIONS  (STANDING):  chlorhexidine 4% Liquid 1 Application(s) Topical <User Schedule>  meropenem  IVPB 2000 milliGRAM(s) IV Intermittent every 8 hours  methylPREDNISolone sodium succinate Injectable 20 milliGRAM(s) IV Push daily  nystatin Powder 1 Application(s) Topical two times a day  PHENobarbital Injectable 60 milliGRAM(s) IV Push daily  polymyxin B IVPB [id number] Unit(s) IV Intermittent every 12 hours  tamsulosin 0.4 milliGRAM(s) Oral at bedtime    MEDICATIONS  (PRN):  acetaminophen  Suppository .. 650 milliGRAM(s) Rectal every 6 hours PRN Temp greater or equal to 38C (100.4F)  ALBUTerol    90 MICROgram(s) HFA Inhaler 2 Puff(s) Inhalation every 6 hours PRN Bronchospasm      HOME MEDICATIONS:  Home Medications:  Albuterol (Eqv-ProAir HFA) 90 mcg/inh inhalation aerosol: 2 puff(s) inhaled every 6 hours (18 Jun 2021 01:48)  albuterol 2.5 mg/3 mL (0.083%) inhalation solution: 3 milliliter(s) inhaled every 6 hours (18 Jun 2021 01:48)  lactulose 10 g/15 mL oral syrup: 15 milliliter(s) orally once a day (18 Jun 2021 01:48)  Milk of Magnesia 8% oral suspension: 15 milliliter(s) orally once a day (at bedtime) (18 Jun 2021 01:48)  PHENobarbital 64.8 mg oral tablet: 1 tab(s) orally once a day via G tube (18 Jun 2021 01:48)      VITALS:   T(F): 97.8 (06-27 @ 08:00), Max: 100.4 (06-25 @ 15:48)  HR: 79 (06-27 @ 08:00) (77 - 98)  BP: 87/54 (06-27 @ 08:00) (86/53 - 108/56)  BP(mean): 65 (06-27 @ 08:00) (64 - 86)  RR: 18 (06-27 @ 08:00) (16 - 20)  SpO2: 100% (06-27 @ 08:00) (96% - 100%)    I&O's Summary    27 Jun 2021 07:01  -  28 Jun 2021 07:00  --------------------------------------------------------  IN: 0 mL / OUT: 1381 mL / NET: -1381 mL        REVIEW OF SYSTEMS:  CONSTITUTIONAL: No weakness, fevers or chills at the moment   EYES: No visual changes  ENT: No vertigo or throat pain   NECK: No pain or stiffness  RESPIRATORY: on O2  CARDIOVASCULAR: No chest pain or palpitations  GASTROINTESTINAL: No abdominal or epigastric pain. No nausea, vomiting, or hematemesis; No diarrhea or constipation. No melena or hematochezia.  GENITOURINARY: has chronic SPC and a R nephrostomy tube placed 6/18  NEUROLOGICAL: No numbness or weakness  SKIN: No itching, no rashes  MSK: no    PHYSICAL EXAM:  NEURO: patient is awake, alert and oriented, on supplemental oxygen via NC (4L)  GEN: Not in acute distress  NECK: no thyroid enlargement, no JVD  LUNGS: Clear to auscultation bilaterally   CARDIOVASCULAR: S1/S2 present, RRR , no murmurs or rubs, no carotid bruits,  + PP bilaterally  ABD: Soft, non-tender, non-distended, +BS. PEG tube in place. R nephrostomy tube and SPC  EXT: No DORIS, NL UE contractures     LABS:                        11.5   9.59  )-----------( 247      ( 27 Jun 2021 06:30 )             33.6     06-27    138  |  93<L>  |  20  ----------------------------<  61<L>  3.3<L>   |  29  |  0.6<L>    Ca    8.7      27 Jun 2021 06:30  Mg     1.4     06-27    TPro  5.7<L>  /  Alb  3.0<L>  /  TBili  0.3  /  DBili  x   /  AST  22  /  ALT  18  /  AlkPhos  69  06-27    PT/INR - ( 27 Jun 2021 06:30 )   PT: 13.90 sec;   INR: 1.21 ratio         PTT - ( 27 Jun 2021 06:30 )  PTT:29.2 sec      Troponin trend:  Troponin T, Serum in AM (06.19.21 @ 07:11)    Troponin T, Serum: 0.03: Critical value: ng/mL   Troponin T, Serum: 0.05: Critical value: ng/mL (06.19.21 @ 01:49)   Troponin T, Serum: 0.13: Critical value (06.18.21 @ 20:18)   Troponin T, Serum: 0.01 ng/mL (06.18.21 @ 09:29)   06-18 Chol 122 LDL -- HDL 56 Trig 56      RADIOLOGY:  - CXR: < from: Xray Chest 1 View- PORTABLE-Routine (Xray Chest 1 View- PORTABLE-Routine in AM.) (06.26.21 @ 06:15) >  Impression:  No radiographic evidence of acute cardiopulmonary disease.    - TTE:  < from: TTE Echo Complete w/o Contrast w/ Doppler (06.21.21 @ 09:28) >  Summary:   1. Normal global left ventricular systolic function.   2. LV Ejection Fraction by Byrd's Method with a biplane EF of 62 %.   3. Normal left ventricular internal cavity size.   4. The mean global longitudinal peak strain by speckle tracking is -19.9% which is normal.   5. Normal left atrial size.   6. Normal right atrial size.   7. No evidence of mitral valve regurgitation.   8. Mild pulmonic valve regurgitation.   9. LA volume Index is 17.1 ml/m² ml/m2.  10. Peak transaortic gradient equals 4.9 mmHg, mean transaortic gradient equals 2.3 mmHg, the calculated aortic valve area equals 2.48 cm² by the continuity equation consistent with mild aortic stenosis.    ECG:    < from: 12 Lead ECG (06.19.21 @ 07:53) >  Ventricular Rate 85 BPM  Atrial Rate 85 BPM  P-R Interval 122 ms  QRS Duration 80 ms  Q-T Interval 380 ms  QTC Calculation(Bazett) 452 ms  P Axis 46 degrees  R Axis 22 degrees  T Axis -10 degrees  Diagnosis Line Normal sinus rhythm  Nonspecific T wave abnormality minor  Otherwise normal ECG    TELEMETRY EVENTS:   HPI:  64 years old Male with PMHx of Cerebral Palsy, Seizure disorder, spastic paraplegia s/p PEG, BPH, Hx of Nephrolithiasis s/p pcnl x 2 with known incompetent bladder neck s/p SPT presents to ED with Right sided flank pain. Patient seen and examined at bedside, History obtained from group home chart. Patient reports feeling flank pain in the afternoon, radiating to RLQ, 10/10 with associated nausea/vomiting. Patient had Right-sided PCNL 5/20. Denies any fever, chills, SOB, CP, dysuria, hematuria. SPT in place draining cloudy yellow urine.   In ED, vitals were WNL, Labs significant for elevated lactate 2.2, UA + for bacteruria, LKE +.  CT A/P shows Delayed right nephrogram. Moderate right hydroureteronephrosis secondary to an obstructing right proximal ureteral stone. Stone size is difficult to measure due to extensive artifact. This stone appears to measure at least 6 mm craniocaudally (max ). He also has a right upper pole stone. Patient received 8mg of Morphine, IVF, Zofran, toradol and cefepime in ED patient reports pain is the same 10/10. To be admitted under medicine.  (18 Jun 2021 01:02)    PAST MEDICAL & SURGICAL HISTORY  BPH (benign prostatic hyperplasia)    Cerebral palsy    Seizure  last seizure &gt;10 years ago    Osteoporosis    Spastic quadriplegia    Urinary calculi    Urinary retention    Asthma    S/P percutaneous endoscopic gastrostomy (PEG) tube placement    H/O cystoscopy    Suprapubic catheter        FAMILY HISTORY:  FAMILY HISTORY:  No pertinent past medical history of premature cardiovascular disease in first degree relatives     SOCIAL HISTORY:  [x]smoker  [x]Alcohol  [x]Drug    ALLERGIES:  No Known Allergies      MEDICATIONS:  MEDICATIONS  (STANDING):  chlorhexidine 4% Liquid 1 Application(s) Topical <User Schedule>  meropenem  IVPB 2000 milliGRAM(s) IV Intermittent every 8 hours  methylPREDNISolone sodium succinate Injectable 20 milliGRAM(s) IV Push daily  nystatin Powder 1 Application(s) Topical two times a day  PHENobarbital Injectable 60 milliGRAM(s) IV Push daily  polymyxin B IVPB [id number] Unit(s) IV Intermittent every 12 hours  tamsulosin 0.4 milliGRAM(s) Oral at bedtime    MEDICATIONS  (PRN):  acetaminophen  Suppository .. 650 milliGRAM(s) Rectal every 6 hours PRN Temp greater or equal to 38C (100.4F)  ALBUTerol    90 MICROgram(s) HFA Inhaler 2 Puff(s) Inhalation every 6 hours PRN Bronchospasm      HOME MEDICATIONS:  Home Medications:  Albuterol (Eqv-ProAir HFA) 90 mcg/inh inhalation aerosol: 2 puff(s) inhaled every 6 hours (18 Jun 2021 01:48)  albuterol 2.5 mg/3 mL (0.083%) inhalation solution: 3 milliliter(s) inhaled every 6 hours (18 Jun 2021 01:48)  lactulose 10 g/15 mL oral syrup: 15 milliliter(s) orally once a day (18 Jun 2021 01:48)  Milk of Magnesia 8% oral suspension: 15 milliliter(s) orally once a day (at bedtime) (18 Jun 2021 01:48)  PHENobarbital 64.8 mg oral tablet: 1 tab(s) orally once a day via G tube (18 Jun 2021 01:48)      VITALS:   T(F): 97.8 (06-27 @ 08:00), Max: 100.4 (06-25 @ 15:48)  HR: 79 (06-27 @ 08:00) (77 - 98)  BP: 87/54 (06-27 @ 08:00) (86/53 - 108/56)  BP(mean): 65 (06-27 @ 08:00) (64 - 86)  RR: 18 (06-27 @ 08:00) (16 - 20)  SpO2: 100% (06-27 @ 08:00) (96% - 100%)    I&O's Summary    27 Jun 2021 07:01  -  28 Jun 2021 07:00  --------------------------------------------------------  IN: 0 mL / OUT: 1381 mL / NET: -1381 mL        REVIEW OF SYSTEMS:  CONSTITUTIONAL: No weakness, fevers or chills at the moment   EYES: No visual changes  ENT: No vertigo or throat pain   NECK: No pain or stiffness  RESPIRATORY: on O2  CARDIOVASCULAR: No chest pain or palpitations  GASTROINTESTINAL: No abdominal or epigastric pain. No nausea, vomiting, or hematemesis; No diarrhea or constipation. No melena or hematochezia.  GENITOURINARY: has chronic SPC and a R nephrostomy tube placed 6/18  NEUROLOGICAL: No numbness or weakness  SKIN: No itching, no rashes  MSK: no    PHYSICAL EXAM:  NEURO: patient is awake, alert and oriented, on supplemental oxygen via NC (4L)  GEN: Not in acute distress  NECK: no thyroid enlargement, no JVD  LUNGS: Clear to auscultation bilaterally   CARDIOVASCULAR: S1/S2 present, RRR , no murmurs or rubs, no carotid bruits,  + PP bilaterally  ABD: Soft, non-tender, non-distended, +BS. PEG tube in place. R nephrostomy tube and SPC  EXT: No DORIS, NL UE contractures     LABS:                        11.5   9.59  )-----------( 247      ( 27 Jun 2021 06:30 )             33.6     06-27    138  |  93<L>  |  20  ----------------------------<  61<L>  3.3<L>   |  29  |  0.6<L>    Ca    8.7      27 Jun 2021 06:30  Mg     1.4     06-27    TPro  5.7<L>  /  Alb  3.0<L>  /  TBili  0.3  /  DBili  x   /  AST  22  /  ALT  18  /  AlkPhos  69  06-27    PT/INR - ( 27 Jun 2021 06:30 )   PT: 13.90 sec;   INR: 1.21 ratio         PTT - ( 27 Jun 2021 06:30 )  PTT:29.2 sec      Troponin trend:  Troponin T, Serum in AM (06.19.21 @ 07:11)    Troponin T, Serum: 0.03: Critical value: ng/mL   Troponin T, Serum: 0.05: Critical value: ng/mL (06.19.21 @ 01:49)   Troponin T, Serum: 0.13: Critical value (06.18.21 @ 20:18)   Troponin T, Serum: 0.01 ng/mL (06.18.21 @ 09:29)   06-18 Chol 122 LDL -- HDL 56 Trig 56      RADIOLOGY:  - CXR: < from: Xray Chest 1 View- PORTABLE-Routine (Xray Chest 1 View- PORTABLE-Routine in AM.) (06.26.21 @ 06:15) >  Impression:  No radiographic evidence of acute cardiopulmonary disease.    - TTE:  < from: TTE Echo Complete w/o Contrast w/ Doppler (06.21.21 @ 09:28) >  Summary:   1. Normal global left ventricular systolic function.   2. LV Ejection Fraction by Byrd's Method with a biplane EF of 62 %.   3. Normal left ventricular internal cavity size.   4. The mean global longitudinal peak strain by speckle tracking is -19.9% which is normal.   5. Normal left atrial size.   6. Normal right atrial size.   7. No evidence of mitral valve regurgitation.   8. Mild pulmonic valve regurgitation.   9. LA volume Index is 17.1 ml/m² ml/m2.  10. Peak transaortic gradient equals 4.9 mmHg, mean transaortic gradient equals 2.3 mmHg, the calculated aortic valve area equals 2.48 cm² by the continuity equation consistent with mild aortic stenosis.    ECG:    < from: 12 Lead ECG (06.19.21 @ 07:53) >  Ventricular Rate 85 BPM  Atrial Rate 85 BPM  P-R Interval 122 ms  QRS Duration 80 ms  Q-T Interval 380 ms  QTC Calculation(Bazett) 452 ms  P Axis 46 degrees  R Axis 22 degrees  T Axis -10 degrees  Diagnosis Line Normal sinus rhythm  Nonspecific T wave abnormality minor  Otherwise normal ECG    TELEMETRY EVENTS:  No significant events HPI:  64 years old Male with PMHx of Cerebral Palsy, Seizure disorder, spastic paraplegia s/p PEG, BPH, Hx of Nephrolithiasis s/p pcnl x 2 with known incompetent bladder neck s/p SPT presents to ED with Right sided flank pain. Patient seen and examined at bedside, History obtained from group home chart. Patient reports feeling flank pain in the afternoon, radiating to RLQ, 10/10 with associated nausea/vomiting. Patient had Right-sided PCNL 5/20. Denies any fever, chills, SOB, CP, dysuria, hematuria. SPT in place draining cloudy yellow urine.   In ED, vitals were WNL, Labs significant for elevated lactate 2.2, UA + for bacteruria, LKE +.  CT A/P shows Delayed right nephrogram. Moderate right hydroureteronephrosis secondary to an obstructing right proximal ureteral stone. Stone size is difficult to measure due to extensive artifact. This stone appears to measure at least 6 mm craniocaudally (max ). He also has a right upper pole stone. Patient received 8mg of Morphine, IVF, Zofran, toradol and cefepime in ED patient reports pain is the same 10/10. To be admitted under medicine.  (18 Jun 2021 01:02)      PAST MEDICAL & SURGICAL HISTORY  BPH (benign prostatic hyperplasia)    Cerebral palsy    Seizure  last seizure &gt;10 years ago    Osteoporosis    Spastic quadriplegia    Urinary calculi    Urinary retention    Asthma    S/P percutaneous endoscopic gastrostomy (PEG) tube placement    H/O cystoscopy    Suprapubic catheter      No pertinent past medical history of premature cardiovascular disease in first degree relatives     No pertinent family history of premature CAD in first degree relatives    ALLERGIES:  No Known Allergies      MEDICATIONS:  MEDICATIONS  (STANDING):  chlorhexidine 4% Liquid 1 Application(s) Topical <User Schedule>  meropenem  IVPB 2000 milliGRAM(s) IV Intermittent every 8 hours  methylPREDNISolone sodium succinate Injectable 20 milliGRAM(s) IV Push daily  nystatin Powder 1 Application(s) Topical two times a day  PHENobarbital Injectable 60 milliGRAM(s) IV Push daily  polymyxin B IVPB [id number] Unit(s) IV Intermittent every 12 hours  tamsulosin 0.4 milliGRAM(s) Oral at bedtime    MEDICATIONS  (PRN):  acetaminophen  Suppository .. 650 milliGRAM(s) Rectal every 6 hours PRN Temp greater or equal to 38C (100.4F)  ALBUTerol    90 MICROgram(s) HFA Inhaler 2 Puff(s) Inhalation every 6 hours PRN Bronchospasm      HOME MEDICATIONS:  Home Medications:  Albuterol (Eqv-ProAir HFA) 90 mcg/inh inhalation aerosol: 2 puff(s) inhaled every 6 hours (18 Jun 2021 01:48)  albuterol 2.5 mg/3 mL (0.083%) inhalation solution: 3 milliliter(s) inhaled every 6 hours (18 Jun 2021 01:48)  lactulose 10 g/15 mL oral syrup: 15 milliliter(s) orally once a day (18 Jun 2021 01:48)  Milk of Magnesia 8% oral suspension: 15 milliliter(s) orally once a day (at bedtime) (18 Jun 2021 01:48)  PHENobarbital 64.8 mg oral tablet: 1 tab(s) orally once a day via G tube (18 Jun 2021 01:48)      VITALS:   T(F): 97.8 (06-27 @ 08:00), Max: 100.4 (06-25 @ 15:48)  HR: 79 (06-27 @ 08:00) (77 - 98)  BP: 87/54 (06-27 @ 08:00) (86/53 - 108/56)  BP(mean): 65 (06-27 @ 08:00) (64 - 86)  RR: 18 (06-27 @ 08:00) (16 - 20)  SpO2: 100% (06-27 @ 08:00) (96% - 100%)    I&O's Summary    27 Jun 2021 07:01  -  28 Jun 2021 07:00  --------------------------------------------------------  IN: 0 mL / OUT: 1381 mL / NET: -1381 mL        REVIEW OF SYSTEMS:  CONSTITUTIONAL: No weakness, fevers or chills at the moment   EYES: No visual changes  ENT: No vertigo or throat pain   NECK: No pain or stiffness  RESPIRATORY: on O2  CARDIOVASCULAR: No chest pain or palpitations  GASTROINTESTINAL: No abdominal or epigastric pain. No nausea, vomiting, or hematemesis; No diarrhea or constipation. No melena or hematochezia.  GENITOURINARY: has chronic SPC and a R nephrostomy tube placed 6/18  NEUROLOGICAL: No numbness or weakness  SKIN: No itching, no rashes  MSK: no    PHYSICAL EXAM:  NEURO: patient is awake, alert and oriented, on supplemental oxygen via NC (4L)  GEN: Not in acute distress  NECK: no thyroid enlargement, no JVD  LUNGS: Clear to auscultation bilaterally   CARDIOVASCULAR: S1/S2 present, RRR , no murmurs or rubs, no carotid bruits,  + PP bilaterally  ABD: Soft, non-tender, non-distended, +BS. PEG tube in place. R nephrostomy tube and SPC  EXT: No DORIS, NL UE contractures       LABS:                        11.5   9.59  )-----------( 247      ( 27 Jun 2021 06:30 )             33.6     06-27    138  |  93<L>  |  20  ----------------------------<  61<L>  3.3<L>   |  29  |  0.6<L>    Ca    8.7      27 Jun 2021 06:30  Mg     1.4     06-27    TPro  5.7<L>  /  Alb  3.0<L>  /  TBili  0.3  /  DBili  x   /  AST  22  /  ALT  18  /  AlkPhos  69  06-27    PT/INR - ( 27 Jun 2021 06:30 )   PT: 13.90 sec;   INR: 1.21 ratio         PTT - ( 27 Jun 2021 06:30 )  PTT:29.2 sec      Troponin trend:  Troponin T, Serum in AM (06.19.21 @ 07:11)    Troponin T, Serum: 0.03: Critical value: ng/mL   Troponin T, Serum: 0.05: Critical value: ng/mL (06.19.21 @ 01:49)   Troponin T, Serum: 0.13: Critical value (06.18.21 @ 20:18)   Troponin T, Serum: 0.01 ng/mL (06.18.21 @ 09:29)   06-18 Chol 122 LDL -- HDL 56 Trig 56      RADIOLOGY:  - CXR: < from: Xray Chest 1 View- PORTABLE-Routine (Xray Chest 1 View- PORTABLE-Routine in AM.) (06.26.21 @ 06:15) >  Impression:  No radiographic evidence of acute cardiopulmonary disease.      < from: TTE Echo Complete w/o Contrast w/ Doppler (06.21.21 @ 09:28) >  Summary:   1. Normal global left ventricular systolic function.   2. LV Ejection Fraction by Byrd's Method with a biplane EF of 62 %.   3. Normal left ventricular internal cavity size.   4. The mean global longitudinal peak strain by speckle tracking is -19.9% which is normal.   5. Normal left atrial size.   6. Normal right atrial size.   7. No evidence of mitral valve regurgitation.   8. Mild pulmonic valve regurgitation.   9. LA volume Index is 17.1 ml/m² ml/m2.  10. Peak transaortic gradient equals 4.9 mmHg, mean transaortic gradient equals 2.3 mmHg, the calculated aortic valve area equals 2.48 cm² by the continuity equation consistent with mild aortic stenosis.      TELEMETRY EVENTS:  No significant events

## 2021-06-28 NOTE — PROGRESS NOTE ADULT - SUBJECTIVE AND OBJECTIVE BOX
Progress Note    Subjective Pt seen and examined at bedside   66 y/o Male with right nephrolithiasis with nephrostomy tube. Pt doing well  with no acute issues.    [x ] a 10 point review of systems was negative except where noted    Vital signs stable    Gen NC/AT in NAD  SKIN warm, dry with good tugor  Neck supple, FROM  LUNGS No dyspnea, No accessory muscle use  BACK No CVAT right nephrostomy draining clear urine  ABD    Soft non tender, bladder not palpable   SP tube drains clear urine      Labs                        11.5   9.59  )-----------( 247      ( 27 Jun 2021 06:30 )             33.6     27 Jun 2021 06:30    138    |  93     |  20     ----------------------------<  61     3.3     |  29     |  0.6      Ca    8.7        27 Jun 2021 06:30  Mg     1.4       27 Jun 2021 06:30    Culture - Blood in AM (06.25.21 @ 04:30)   Specimen Source: .Blood Blood-Peripheral   Culture Results:   No growth to date.     Culture - Urine (06.22.21 @ 16:00)   Specimen Source: Kidney Nephrostomy - Right   Culture Results:   No growth at 48 hours   < from: 12 Lead ECG (06.19.21 @ 07:53) >    Ventricular Rate 85 BPM    Atrial Rate 85 BPM    P-R Interval 122 ms    QRS Duration 80 ms    Q-T Interval 380 ms    QTC Calculation(Bazett) 452 ms    P Axis 46 degrees    R Axis 22 degrees    T Axis -10 degrees    Diagnosis Line Normal sinus rhythm  Nonspecific T wave abnormality minor  Otherwise normal ECG    Confirmed by MARIE CAPPS, YESICA ([id number]) on 6/19/2021 10:46:39 AM    < end of copied text >    < from: Xray Chest 1 View- PORTABLE-Routine (Xray Chest 1 View- PORTABLE-Routine in AM.) (06.26.21 @ 06:15) >    EXAM:  XR CHEST PORTABLE ROUTINE 1V            PROCEDURE DATE:  06/26/2021            INTERPRETATION:  Clinical History / Reason for exam: Mechanical ventilation.    Comparison : Chest radiograph June 25, 2021.    Technique/Positioning: Single frontal chest x-ray obtained.    Findings:    Support devices: Stable left IJ central venous catheter.    Cardiac/mediastinum/hilum: Unchanged.    Lung parenchyma/Pleura: No radiographic evidence of acute cardiopulmonary disease.    Skeleton/soft tissues:Unchanged.    Impression:    No radiographic evidence of acute cardiopulmonary disease.                    MARYA MARLEY MD; Attending Radiologist  This document has been electronically signed. Jun 26 2021  3:13PM    < end of copied text >

## 2021-06-28 NOTE — PROGRESS NOTE ADULT - ASSESSMENT
64 y/o mc M with right nephrolithiasis schedule for IR today.    A) Stable  nephrolithiasis    P) IR today for right nephro-ureteral stent placement  NPO after MN  for OR tomorrow for right PCNL  d/w attending

## 2021-06-28 NOTE — PROGRESS NOTE ADULT - ASSESSMENT
HOSPITAL COURSE:    65y M with PMHx of cerebral palsy, Seizure disorder, spastic paraplegia s/p PEG, BPH, Hx of Nephrolithiasis s/p pcnl x 2 (last oen in 5/20/21) with known incompetent bladder neck s/p Suprapubic Tube Placement admitted on 6/17/21 with sepsis 2/2 obstructive nephrolithiasis. CT revealed R proximal ureteral stone with moderate hydroureteronephrosis. On 6/18, pt decompensated to uroseptic shock (T 103F, BP 75/45). Emergent R Percutaneous Nephrolithotomy performed by IR on 6/18/2021, Levophed started. BP improved following procedure and LR bolus. Levophed was discontinued on 6/20, and patient has been hemodynamically stable since. Pt was weaned off HiFlow to nasal canula (6/22).     As of 6/24, pt was cleared by ID for any procedures.   On 6/25, IR was contacted for conversion of nephrostomy to nephro ureteral stent, as well assessment of PEG tube which is clogged.   Pt scheduled to undergo PCN to Nephroureteral Stent on 6/28, with PCNL on 6/29. On 6/28, IR will also assess PEG tube.     Spoke with sister Leslie on 6/28, and let her know the plan for stent and PCNL.       ASSESSMENT & PLAN:    # Septic shock 2/2 bacteremia from pyelonephritis + obstructive nephrolithiasis - resolved  # obstructive urosepsis - resolved  - s/p R PCN on 6/18  - Blood Cultures 6/18 (+) for carbap. resistant Pseudomonas and e.Fecalis; Blood Culx 6/25: NGTD  - Urine Culx 6/18 (+) pseudomonas, proteus, ESBL, s aureus, actinotignum   - as per ID reccs: c/w Meropenem and Polymyxin; as of 6/24, cleared for any procedures  - Nephrostomy tube to nephroureteral stent by IR on 6/28   - PEG tube assessment by IR on 6/28  - PCNL procedure by Uro tentatively scheduled for 6/29  - COVID-19 PCR on 6/27 (-)   - f/u PT/PTT/INR for preop   - f/u cardio clearance  - NPO since 6/28 12am     #Gross Hematuria - resolved  # Bleeding from Nephrostomy tube - resolved  - CT 6/24: appropriately positioned nephrostomy tube with decreased now mild hydroureteronephrosis  - Hb Stable: 12.2 (6/25) --> 11.5 (6/28)    #acute hypoxic respiratory failure possibly secondary to acute seizure - resolved  - CXR 6/26: No evidence of cardiopulmonary disease  - TTE: EF 62%, mild aortic stenosis  - stable on 4L NC (6/25)    #Seizure disorder  #Hx of cerebral palsy/spastic paraplegia s/p PEG tube   - was a rapid response in IR for seizure on 6/18  - 6/23: IV Phenobarb changed to -->64.8mg PO phenobarbital via peg qd  - 6/25: PO phenobarbital changed to --> 60mg phenobarbital IV because peg tube clogged      #BPH: c/w tamsulosin   #Asthma:  c/w albuterol 90 microgram Q6h PRN     #MISC  DVT ppx: lovenox  GI ppx: none  Diet: PEG feeds  Code Status: FULL CODE  Dispo: from nursing home; f/u Nasal cannula trial,     Followup:   - PCN to NUStent conversion by IR scheduled for 6/28  - PCNL procedure tentatively scheduled for 6/29  - f/u with IR who will assess why peg tube will not work 6/28  - Cardio Clearance prior to PCNL procedure   - f/u PT/PTT/INR (preop labs) sent on 6/28

## 2021-06-29 ENCOUNTER — APPOINTMENT (OUTPATIENT)
Dept: UROLOGY | Facility: HOSPITAL | Age: 66
End: 2021-06-29

## 2021-06-29 DIAGNOSIS — N20.1 CALCULUS OF URETER: ICD-10-CM

## 2021-06-29 LAB
ALBUMIN SERPL ELPH-MCNC: 3 G/DL — LOW (ref 3.5–5.2)
ALP SERPL-CCNC: 62 U/L — SIGNIFICANT CHANGE UP (ref 30–115)
ALT FLD-CCNC: 13 U/L — SIGNIFICANT CHANGE UP (ref 0–41)
ANION GAP SERPL CALC-SCNC: 13 MMOL/L — SIGNIFICANT CHANGE UP (ref 7–14)
APTT BLD: 27.4 SEC — SIGNIFICANT CHANGE UP (ref 27–39.2)
AST SERPL-CCNC: 21 U/L — SIGNIFICANT CHANGE UP (ref 0–41)
BILIRUB SERPL-MCNC: 0.3 MG/DL — SIGNIFICANT CHANGE UP (ref 0.2–1.2)
BUN SERPL-MCNC: 11 MG/DL — SIGNIFICANT CHANGE UP (ref 10–20)
CALCIUM SERPL-MCNC: 8.3 MG/DL — LOW (ref 8.5–10.1)
CHLORIDE SERPL-SCNC: 95 MMOL/L — LOW (ref 98–110)
CO2 SERPL-SCNC: 30 MMOL/L — SIGNIFICANT CHANGE UP (ref 17–32)
CREAT SERPL-MCNC: 0.5 MG/DL — LOW (ref 0.7–1.5)
CULTURE RESULTS: SIGNIFICANT CHANGE UP
CULTURE RESULTS: SIGNIFICANT CHANGE UP
GLUCOSE SERPL-MCNC: 70 MG/DL — SIGNIFICANT CHANGE UP (ref 70–99)
HCT VFR BLD CALC: 32 % — LOW (ref 42–52)
HGB BLD-MCNC: 11 G/DL — LOW (ref 14–18)
INR BLD: 1.28 RATIO — SIGNIFICANT CHANGE UP (ref 0.65–1.3)
MAGNESIUM SERPL-MCNC: 0.9 MG/DL — LOW (ref 1.8–2.4)
MCHC RBC-ENTMCNC: 30.6 PG — SIGNIFICANT CHANGE UP (ref 27–31)
MCHC RBC-ENTMCNC: 34.4 G/DL — SIGNIFICANT CHANGE UP (ref 32–37)
MCV RBC AUTO: 89.1 FL — SIGNIFICANT CHANGE UP (ref 80–94)
NRBC # BLD: 0 /100 WBCS — SIGNIFICANT CHANGE UP (ref 0–0)
PLATELET # BLD AUTO: 271 K/UL — SIGNIFICANT CHANGE UP (ref 130–400)
POTASSIUM SERPL-MCNC: 3.3 MMOL/L — LOW (ref 3.5–5)
POTASSIUM SERPL-SCNC: 3.3 MMOL/L — LOW (ref 3.5–5)
PROT SERPL-MCNC: 5.6 G/DL — LOW (ref 6–8)
PROTHROM AB SERPL-ACNC: 14.7 SEC — HIGH (ref 9.95–12.87)
RBC # BLD: 3.59 M/UL — LOW (ref 4.7–6.1)
RBC # FLD: 12.7 % — SIGNIFICANT CHANGE UP (ref 11.5–14.5)
SODIUM SERPL-SCNC: 138 MMOL/L — SIGNIFICANT CHANGE UP (ref 135–146)
SPECIMEN SOURCE: SIGNIFICANT CHANGE UP
SPECIMEN SOURCE: SIGNIFICANT CHANGE UP
WBC # BLD: 9.57 K/UL — SIGNIFICANT CHANGE UP (ref 4.8–10.8)
WBC # FLD AUTO: 9.57 K/UL — SIGNIFICANT CHANGE UP (ref 4.8–10.8)

## 2021-06-29 PROCEDURE — 74425 UROGRAPHY ANTEGRADE RS&I: CPT | Mod: 26,RT

## 2021-06-29 PROCEDURE — 99232 SBSQ HOSP IP/OBS MODERATE 35: CPT

## 2021-06-29 PROCEDURE — 50684 INJECTION FOR URETER X-RAY: CPT | Mod: 59,RT

## 2021-06-29 PROCEDURE — 74018 RADEX ABDOMEN 1 VIEW: CPT | Mod: 26

## 2021-06-29 PROCEDURE — 71045 X-RAY EXAM CHEST 1 VIEW: CPT | Mod: 26

## 2021-06-29 PROCEDURE — 50951 ENDOSCOPY OF URETER: CPT | Mod: RT

## 2021-06-29 PROCEDURE — 50387 CHANGE NEPHROURETERAL CATH: CPT | Mod: RT

## 2021-06-29 PROCEDURE — 71045 X-RAY EXAM CHEST 1 VIEW: CPT | Mod: 26,77

## 2021-06-29 PROCEDURE — 50080 PERQ NL/PL LITHOTRP SMPL<2CM: CPT | Mod: RT

## 2021-06-29 RX ORDER — CHLORHEXIDINE GLUCONATE 213 G/1000ML
1 SOLUTION TOPICAL
Refills: 0 | Status: DISCONTINUED | OUTPATIENT
Start: 2021-06-29 | End: 2021-07-06

## 2021-06-29 RX ORDER — PHENOBARBITAL 60 MG
60 TABLET ORAL DAILY
Refills: 0 | Status: DISCONTINUED | OUTPATIENT
Start: 2021-06-29 | End: 2021-06-30

## 2021-06-29 RX ORDER — ACETAMINOPHEN 500 MG
650 TABLET ORAL EVERY 6 HOURS
Refills: 0 | Status: DISCONTINUED | OUTPATIENT
Start: 2021-06-29 | End: 2021-07-06

## 2021-06-29 RX ORDER — POTASSIUM CHLORIDE 20 MEQ
20 PACKET (EA) ORAL ONCE
Refills: 0 | Status: DISCONTINUED | OUTPATIENT
Start: 2021-06-29 | End: 2021-06-29

## 2021-06-29 RX ORDER — ONDANSETRON 8 MG/1
4 TABLET, FILM COATED ORAL EVERY 8 HOURS
Refills: 0 | Status: DISCONTINUED | OUTPATIENT
Start: 2021-06-29 | End: 2021-06-29

## 2021-06-29 RX ORDER — IOHEXOL 300 MG/ML
30 INJECTION, SOLUTION INTRAVENOUS ONCE
Refills: 0 | Status: COMPLETED | OUTPATIENT
Start: 2021-06-29 | End: 2021-06-29

## 2021-06-29 RX ORDER — HYDROMORPHONE HYDROCHLORIDE 2 MG/ML
1 INJECTION INTRAMUSCULAR; INTRAVENOUS; SUBCUTANEOUS
Refills: 0 | Status: DISCONTINUED | OUTPATIENT
Start: 2021-06-29 | End: 2021-06-29

## 2021-06-29 RX ORDER — METHYLPREDNISOLONE 4 MG
1000 TABLET ORAL ONCE
Refills: 0 | Status: DISCONTINUED | OUTPATIENT
Start: 2021-06-29 | End: 2021-06-29

## 2021-06-29 RX ORDER — POLYMYXIN B SULFATE 500000 [USP'U]/1
700000 INJECTION, POWDER, LYOPHILIZED, FOR SOLUTION INTRAMUSCULAR; INTRATHECAL; INTRAVENOUS; OPHTHALMIC EVERY 12 HOURS
Refills: 0 | Status: DISCONTINUED | OUTPATIENT
Start: 2021-06-29 | End: 2021-07-06

## 2021-06-29 RX ORDER — TAMSULOSIN HYDROCHLORIDE 0.4 MG/1
0.4 CAPSULE ORAL AT BEDTIME
Refills: 0 | Status: DISCONTINUED | OUTPATIENT
Start: 2021-06-29 | End: 2021-07-06

## 2021-06-29 RX ORDER — MEROPENEM 1 G/30ML
1000 INJECTION INTRAVENOUS EVERY 8 HOURS
Refills: 0 | Status: DISCONTINUED | OUTPATIENT
Start: 2021-06-29 | End: 2021-07-06

## 2021-06-29 RX ORDER — ALBUTEROL 90 UG/1
2 AEROSOL, METERED ORAL EVERY 6 HOURS
Refills: 0 | Status: DISCONTINUED | OUTPATIENT
Start: 2021-06-29 | End: 2021-07-06

## 2021-06-29 RX ORDER — HYDROMORPHONE HYDROCHLORIDE 2 MG/ML
0.5 INJECTION INTRAMUSCULAR; INTRAVENOUS; SUBCUTANEOUS
Refills: 0 | Status: DISCONTINUED | OUTPATIENT
Start: 2021-06-29 | End: 2021-06-29

## 2021-06-29 RX ADMIN — MEROPENEM 100 MILLIGRAM(S): 1 INJECTION INTRAVENOUS at 14:23

## 2021-06-29 RX ADMIN — MEROPENEM 200 MILLIGRAM(S): 1 INJECTION INTRAVENOUS at 05:11

## 2021-06-29 RX ADMIN — POLYMYXIN B SULFATE 500 UNIT(S): 500000 INJECTION, POWDER, LYOPHILIZED, FOR SOLUTION INTRAMUSCULAR; INTRATHECAL; INTRAVENOUS; OPHTHALMIC at 18:22

## 2021-06-29 RX ADMIN — NYSTATIN CREAM 1 APPLICATION(S): 100000 CREAM TOPICAL at 05:14

## 2021-06-29 RX ADMIN — Medication 60 MILLIGRAM(S): at 14:21

## 2021-06-29 RX ADMIN — CHLORHEXIDINE GLUCONATE 1 APPLICATION(S): 213 SOLUTION TOPICAL at 05:11

## 2021-06-29 RX ADMIN — POLYMYXIN B SULFATE 500 UNIT(S): 500000 INJECTION, POWDER, LYOPHILIZED, FOR SOLUTION INTRAMUSCULAR; INTRATHECAL; INTRAVENOUS; OPHTHALMIC at 06:11

## 2021-06-29 RX ADMIN — MEROPENEM 100 MILLIGRAM(S): 1 INJECTION INTRAVENOUS at 21:06

## 2021-06-29 RX ADMIN — TAMSULOSIN HYDROCHLORIDE 0.4 MILLIGRAM(S): 0.4 CAPSULE ORAL at 21:06

## 2021-06-29 RX ADMIN — IOHEXOL 30 MILLILITER(S): 300 INJECTION, SOLUTION INTRAVENOUS at 13:27

## 2021-06-29 NOTE — BRIEF OPERATIVE NOTE - NSICDXBRIEFPREOP_GEN_ALL_CORE_FT
PRE-OP DIAGNOSIS:  Right renal stone 29-Jun-2021 11:12:48  Bonnie Cooper  Right ureteral stone 29-Jun-2021 11:12:55  Bonnie Cooper

## 2021-06-29 NOTE — PROGRESS NOTE ADULT - SUBJECTIVE AND OBJECTIVE BOX
Patient is a 65y old  Male who presents with a chief complaint of Nephrolithiasis (29 Jun 2021 15:29)        Over Night Events:        ROS:     All ROS are negative except HPI         PHYSICAL EXAM    ICU Vital Signs Last 24 Hrs  T(C): 37.1 (29 Jun 2021 16:00), Max: 37.3 (28 Jun 2021 20:00)  T(F): 98.7 (29 Jun 2021 16:00), Max: 99.1 (28 Jun 2021 20:00)  HR: 98 (29 Jun 2021 16:00) (60 - 98)  BP: 115/59 (29 Jun 2021 16:00) (86/51 - 160/85)  BP(mean): 81 (29 Jun 2021 16:00) (62 - 87)  ABP: --  ABP(mean): --  RR: 17 (29 Jun 2021 16:00) (14 - 20)  SpO2: 100% (29 Jun 2021 16:00) (94% - 100%)      CONSTITUTIONAL:  chronically ill appearing in  NAD    ENT:   Airway patent,   Mouth with normal mucosa.   No thrush  on NC    EYES:   Pupils equal,   Round and reactive to light.    CARDIAC:   Normal rate,   Regular rhythm.    No edema      Vascular:  Normal systolic impulse  No Carotid bruits    RESPIRATORY:   No wheezing  Bilateral BS  Normal chest expansion  Not tachypneic,  No use of accessory muscles    GASTROINTESTINAL:  Abdomen soft,   Non-tender,   No guarding,   + BS    MUSCULOSKELETAL:   Range of motion is not limited,  No clubbing, cyanosis    NEUROLOGICAL:   Alert awake  Can answer simple questions  contracted    SKIN:   Skin normal color for race,   Warm and dry and intact.   No evidence of rash.    PSYCHIATRIC:   Normal mood and affect.   No apparent risk to self or others.        06-28-21 @ 07:01  -  06-29-21 @ 07:00  --------------------------------------------------------  IN:  Total IN: 0 mL    OUT:    Indwelling Catheter - Suprapubic (mL): 1500 mL    Nephrostomy Tube (mL): 100 mL    Stool (mL): 0 mL  Total OUT: 1600 mL    Total NET: -1600 mL      06-29-21 @ 07:01  -  06-29-21 @ 18:20  --------------------------------------------------------  IN:    Enteral Tube Flush: 50 mL    Jevity 1.2: 300 mL  Total IN: 350 mL    OUT:    Indwelling Catheter - Suprapubic (mL): 100 mL    Nephrostomy Tube (mL): 470 mL  Total OUT: 570 mL    Total NET: -220 mL          LABS:                            11.0   9.57  )-----------( 271      ( 29 Jun 2021 03:00 )             32.0                                               06-29    138  |  95<L>  |  11  ----------------------------<  70  3.3<L>   |  30  |  0.5<L>    Ca    8.3<L>      29 Jun 2021 03:00  Mg     0.9     06-29    TPro  5.6<L>  /  Alb  3.0<L>  /  TBili  0.3  /  DBili  x   /  AST  21  /  ALT  13  /  AlkPhos  62  06-29      PT/INR - ( 29 Jun 2021 03:00 )   PT: 14.70 sec;   INR: 1.28 ratio         PTT - ( 29 Jun 2021 03:00 )  PTT:27.4 sec                                                                                     LIVER FUNCTIONS - ( 29 Jun 2021 03:00 )  Alb: 3.0 g/dL / Pro: 5.6 g/dL / ALK PHOS: 62 U/L / ALT: 13 U/L / AST: 21 U/L / GGT: x                                                                                                                                       MEDICATIONS  (STANDING):  chlorhexidine 4% Liquid 1 Application(s) Topical <User Schedule>  lactated ringers. 500 milliLiter(s) (50 mL/Hr) IV Continuous <Continuous>  meropenem  IVPB 1000 milliGRAM(s) IV Intermittent every 8 hours  PHENobarbital Injectable 60 milliGRAM(s) IV Push daily  polymyxin B IVPB 515945 Unit(s) IV Intermittent every 12 hours  tamsulosin 0.4 milliGRAM(s) Oral at bedtime    MEDICATIONS  (PRN):  acetaminophen  Suppository .. 650 milliGRAM(s) Rectal every 6 hours PRN Temp greater or equal to 38C (100.4F)  ALBUTerol    90 MICROgram(s) HFA Inhaler 2 Puff(s) Inhalation every 6 hours PRN Bronchospasm      New X-rays reviewed:                                                                                  ECHO    CXR interpreted by me:       Patient is a 65y old  Male who presents with a chief complaint of Nephrolithiasis (29 Jun 2021 15:29)        Over Night Events: No events         ROS:     All ROS are negative except HPI         PHYSICAL EXAM    ICU Vital Signs Last 24 Hrs  T(C): 37.1 (29 Jun 2021 16:00), Max: 37.3 (28 Jun 2021 20:00)  T(F): 98.7 (29 Jun 2021 16:00), Max: 99.1 (28 Jun 2021 20:00)  HR: 98 (29 Jun 2021 16:00) (60 - 98)  BP: 115/59 (29 Jun 2021 16:00) (86/51 - 160/85)  BP(mean): 81 (29 Jun 2021 16:00) (62 - 87)  ABP: --  ABP(mean): --  RR: 17 (29 Jun 2021 16:00) (14 - 20)  SpO2: 100% (29 Jun 2021 16:00) (94% - 100%)      CONSTITUTIONAL:  chronically ill appearing in  NAD    ENT:   Airway patent,   Mouth with normal mucosa.   No thrush  on NC    EYES:   Pupils equal,   Round and reactive to light.    CARDIAC:   Normal rate,   Regular rhythm.    No edema      Vascular:  Normal systolic impulse  No Carotid bruits    RESPIRATORY:   No wheezing  Bilateral BS  Normal chest expansion  Not tachypneic,  No use of accessory muscles    GASTROINTESTINAL:  Abdomen soft,   Non-tender,   No guarding,   + BS    MUSCULOSKELETAL:   Range of motion is not limited,  No clubbing, cyanosis    NEUROLOGICAL:   Alert awake  Can answer simple questions  contracted    SKIN:   Skin normal color for race,   Warm and dry and intact.   No evidence of rash.    PSYCHIATRIC:   Normal mood and affect.   No apparent risk to self or others.        06-28-21 @ 07:01  -  06-29-21 @ 07:00  --------------------------------------------------------  IN:  Total IN: 0 mL    OUT:    Indwelling Catheter - Suprapubic (mL): 1500 mL    Nephrostomy Tube (mL): 100 mL    Stool (mL): 0 mL  Total OUT: 1600 mL    Total NET: -1600 mL      06-29-21 @ 07:01 - 06-29-21 @ 18:20  --------------------------------------------------------  IN:    Enteral Tube Flush: 50 mL    Jevity 1.2: 300 mL  Total IN: 350 mL    OUT:    Indwelling Catheter - Suprapubic (mL): 100 mL    Nephrostomy Tube (mL): 470 mL  Total OUT: 570 mL    Total NET: -220 mL          LABS:                            11.0   9.57  )-----------( 271      ( 29 Jun 2021 03:00 )             32.0                                               06-29    138  |  95<L>  |  11  ----------------------------<  70  3.3<L>   |  30  |  0.5<L>    Ca    8.3<L>      29 Jun 2021 03:00  Mg     0.9     06-29    TPro  5.6<L>  /  Alb  3.0<L>  /  TBili  0.3  /  DBili  x   /  AST  21  /  ALT  13  /  AlkPhos  62  06-29      PT/INR - ( 29 Jun 2021 03:00 )   PT: 14.70 sec;   INR: 1.28 ratio         PTT - ( 29 Jun 2021 03:00 )  PTT:27.4 sec                                                                                     LIVER FUNCTIONS - ( 29 Jun 2021 03:00 )  Alb: 3.0 g/dL / Pro: 5.6 g/dL / ALK PHOS: 62 U/L / ALT: 13 U/L / AST: 21 U/L / GGT: x                                                                                                                                       MEDICATIONS  (STANDING):  chlorhexidine 4% Liquid 1 Application(s) Topical <User Schedule>  lactated ringers. 500 milliLiter(s) (50 mL/Hr) IV Continuous <Continuous>  meropenem  IVPB 1000 milliGRAM(s) IV Intermittent every 8 hours  PHENobarbital Injectable 60 milliGRAM(s) IV Push daily  polymyxin B IVPB 122151 Unit(s) IV Intermittent every 12 hours  tamsulosin 0.4 milliGRAM(s) Oral at bedtime    MEDICATIONS  (PRN):  acetaminophen  Suppository .. 650 milliGRAM(s) Rectal every 6 hours PRN Temp greater or equal to 38C (100.4F)  ALBUTerol    90 MICROgram(s) HFA Inhaler 2 Puff(s) Inhalation every 6 hours PRN Bronchospasm      New X-rays reviewed:                                                                                  ECHO    CXR interpreted by me:

## 2021-06-29 NOTE — PROGRESS NOTE ADULT - SUBJECTIVE AND OBJECTIVE BOX
PHYSICAL EXAM:  Constitutional: pt is comfortable lying in bed; no acute distress; well-groomed  HEENT: Full ROM in bilateral eyes; pupils symmetrical and equal in size; neck supple  Respiratory: (+) sounds in all lung fields; no crackles or wheezes appreciated  Cardiovascular: S1 S2 regular rate and rhythm; no murmurs or gallops appreciated  Gastrointestinal: (+) bowel sounds in all quadrants; abdomen is non-distended, non-tender to superficial and deep palpation  Genitourinary: Edouard Site is clean; nephrostomy site is clean, and non erythematous; pt reports no pain to palpation of nephrostomy site.   Extremities: extremities are non-edematous  Vascular: Warm and Well perfused UE and LE; no mottling or dusky skin   Neurological: extremities contracted  Skin: Rash noted over buttocks; no purulence or weeping   Lymph Nodes: No palpable lymph nodes in neck         TISH SHAIKH 65y Male  MRN#: [id number]   Hospital Day: 12d    SUBJECTIVE  Patient is a 65y old Male who presents with a chief complaint of Nephrolithiasis (29 Jun 2021 15:29)  Currently admitted to medicine with the primary diagnosis of Kidney stones.       INTERVAL HPI AND OVERNIGHT EVENTS:  Patient was examined and seen at bedside. This morning he is resting comfortably in bed and reports no issues or overnight events. NO acute events as per night team.       OBJECTIVE  PAST MEDICAL & SURGICAL HISTORY  BPH (benign prostatic hyperplasia)    Cerebral palsy    Seizure  last seizure &gt;10 years ago    Osteoporosis    Spastic quadriplegia    Urinary calculi    Urinary retention    Asthma    S/P percutaneous endoscopic gastrostomy (PEG) tube placement    H/O cystoscopy    Suprapubic catheter      ALLERGIES:  No Known Allergies    MEDICATIONS:  STANDING MEDICATIONS  chlorhexidine 4% Liquid 1 Application(s) Topical <User Schedule>  lactated ringers. 500 milliLiter(s) IV Continuous <Continuous>  meropenem  IVPB 1000 milliGRAM(s) IV Intermittent every 8 hours  PHENobarbital Injectable 60 milliGRAM(s) IV Push daily  polymyxin B IVPB [id number] Unit(s) IV Intermittent every 12 hours  tamsulosin 0.4 milliGRAM(s) Oral at bedtime    PRN MEDICATIONS  acetaminophen  Suppository .. 650 milliGRAM(s) Rectal every 6 hours PRN  ALBUTerol    90 MICROgram(s) HFA Inhaler 2 Puff(s) Inhalation every 6 hours PRN      PHYSICAL EXAM:  Constitutional: pt is comfortable lying in bed; no acute distress; well-groomed  HEENT: Full ROM in bilateral eyes; pupils symmetrical and equal in size; neck supple  Respiratory: (+) sounds in all lung fields; no crackles or wheezes appreciated  Cardiovascular: S1 S2 regular rate and rhythm; no murmurs or gallops appreciated  Gastrointestinal: (+) bowel sounds in all quadrants; abdomen is non-distended, non-tender to superficial and deep palpation  Genitourinary: Edouard Site is clean; nephrostomy site is clean, and non erythematous; pt reports no pain to palpation of nephrostomy site.   Extremities: extremities are non-edematous  Vascular: Warm and Well perfused UE and LE; no mottling or dusky skin   Neurological: extremities contracted  Skin: Rash noted over buttocks; no purulence or weeping   Lymph Nodes: No palpable lymph nodes in neck    VITAL SIGNS: Last 24 Hours  T(C): 36.7 (29 Jun 2021 20:00), Max: 37.3 (28 Jun 2021 23:31)  T(F): 98.1 (29 Jun 2021 20:00), Max: 99.1 (28 Jun 2021 23:31)  HR: 101 (29 Jun 2021 20:00) (60 - 101)  BP: 111/61 (29 Jun 2021 20:00) (86/51 - 160/85)  BP(mean): 81 (29 Jun 2021 16:00) (62 - 87)  RR: 18 (29 Jun 2021 20:00) (14 - 18)  SpO2: 100% (29 Jun 2021 20:00) (97% - 100%)    LABS:                        11.0   9.57  )-----------( 271      ( 29 Jun 2021 03:00 )             32.0     06-29    138  |  95<L>  |  11  ----------------------------<  70  3.3<L>   |  30  |  0.5<L>    Ca    8.3<L>      29 Jun 2021 03:00  Mg     0.9     06-29    TPro  5.6<L>  /  Alb  3.0<L>  /  TBili  0.3  /  DBili  x   /  AST  21  /  ALT  13  /  AlkPhos  62  06-29    PT/INR - ( 29 Jun 2021 03:00 )   PT: 14.70 sec;   INR: 1.28 ratio         PTT - ( 29 Jun 2021 03:00 )  PTT:27.4 sec              RADIOLOGY:  < from: Xray Chest 1 View- PORTABLE-Urgent (Xray Chest 1 View- PORTABLE-Urgent .) (06.29.21 @ 16:50) >  Impression:    No radiographic evidence of acute cardiopulmonary disease.    Left IJ central venous catheter in essentially unchanged position, with distal tip overlying the left subclavian vein.    < end of copied text >

## 2021-06-29 NOTE — PROGRESS NOTE ADULT - SUBJECTIVE AND OBJECTIVE BOX
UROLOGY: Pt is a 64y/o M with RIGHT nephrolithiasis, s/p RIGHT PCNL, antegrade ureteroscopy POD # 0. Pt seen and examined at bedside.    [ x ] A 10 Point Review of Systems was negative except where noted    Vital signs  T(C): , Max: 37.3 (06-28-21 @ 16:00)  HR: 84 (06-29-21 @ 11:30)  BP: 134/73 (06-29-21 @ 11:30)  SpO2: 99% (06-29-21 @ 11:30)    Constitutional: NAD  HEENT: EOMI  Neck: no pain  Back: No CVA tenderness  Respiratory: No accessory respiratory muscle use  Abd: Soft, nontender, bladder nonpalpable  :  RIGHT PCN in place  Extremities: no edema  Neurological: A/O x 3  Psychiatric: Normal mood, normal affect  Skin: No rashes    Labs             11.0   9.57  )-----------( 271      ( 29 Jun 2021 03:00 )             32.0     138    |  95     |  11     ----------------------------<  70     3.3     |  30     |  0.5      Ca    8.3        29 Jun 2021 03:00  Mg     0.9       29 Jun 2021 03:00 UROLOGY: Pt is a 66y/o M with RIGHT nephrolithiasis, s/p RIGHT PCNL, antegrade ureteroscopy POD # 0. Pt seen and examined at bedside with Dr. Joyner. No acute issues at this time, resting comfortably.     [ x ] A 10 Point Review of Systems was negative except where noted    Vital signs  T(C): , Max: 37.3 (06-28-21 @ 16:00)  HR: 84 (06-29-21 @ 11:30)  BP: 134/73 (06-29-21 @ 11:30)  SpO2: 99% (06-29-21 @ 11:30)    Constitutional: NAD  HEENT: EOMI  Neck: no pain  Back: No CVA tenderness  Respiratory: No accessory respiratory muscle use  Abd: Soft, nontender, bladder nonpalpable  :  RIGHT PCN in place draining slightly pink tinged urine  Extremities: no edema  Neurological: A/O x 3  Psychiatric: Normal mood, normal affect  Skin: No rashes    Labs             11.0   9.57  )-----------( 271      ( 29 Jun 2021 03:00 )             32.0     138    |  95     |  11     ----------------------------<  70     3.3     |  30     |  0.5      Ca    8.3        29 Jun 2021 03:00  Mg     0.9       29 Jun 2021 03:00

## 2021-06-29 NOTE — CHART NOTE - NSCHARTNOTEFT_GEN_A_CORE
PACU ANESTHESIA ADMISSION NOTE      Procedure: Percutaneous nephrolithotripsy     Post op diagnosis:  Right renal stone    Right ureteral stone        ____  Intubated  TV:______       Rate: ______      FiO2: ______    __x__  Patent Airway    __x__  Full return of protective reflexes    __x__  Full recovery from anesthesia / back to baseline status    Vitals:  T(C): 36.8 (06-29-21 @ 08:15), Max: 37.3 (06-28-21 @ 16:00)  HR: 75 (06-29-21 @ 08:15) (75 - 102)  BP: 92/60 (06-29-21 @ 08:15) (86/51 - 107/70)  RR: 17 (06-29-21 @ 08:15) (16 - 20)  SpO2: 98% (06-29-21 @ 08:15) (94% - 99%)    Mental Status:  __x__ Awake   _____ Alert   _____ Drowsy   _____ Sedated    Nausea/Vomiting:  __x__ NO  ______Yes,   See Post - Op Orders          Pain Scale (0-10):  __0___    Treatment: __0__ None    ____ See Post - Op/PCA Orders    Post - Operative Fluids:   ____ Oral   __x__ See Post - Op Orders    Plan: Discharge:   ____Home       _____Floor     __x___Critical Care    _____  Other:_________________    Comments: uneventful anesthesia course no complications. VItals stable. Pt transferred to PACU

## 2021-06-29 NOTE — BRIEF OPERATIVE NOTE - NSICDXBRIEFPROCEDURE_GEN_ALL_CORE_FT
PROCEDURES:  Percutaneous removal of calculus of kidney less than 2 cm in diameter 29-Jun-2021 11:11:14 right renal Bonnie Cooper  Percutaneous removal of calculus of kidney less than 2 cm in diameter 29-Jun-2021 11:11:23 right ureteral Bonnie Cooper  Antegrade ureteroscopy 29-Jun-2021 11:11:28 right Bonnie Cooper  Change external ureteral stent 29-Jun-2021 11:11:37 right NU Bonnie Cooper  Nephrostogram 29-Jun-2021 11:11:45 right nephrostogram then stentogram Bonnie Cooper  Antegrade ureterography 29-Jun-2021 11:11:50 right Bonnie Cooper

## 2021-06-29 NOTE — PROGRESS NOTE ADULT - ASSESSMENT
HOSPITAL COURSE:    65y M with PMHx of cerebral palsy, Seizure disorder, spastic paraplegia s/p PEG, BPH, Hx of Nephrolithiasis s/p pcnl x 2 (last oen in 5/20/21) with known incompetent bladder neck s/p Suprapubic Tube Placement admitted on 6/17/21 with sepsis 2/2 obstructive nephrolithiasis. CT revealed R proximal ureteral stone with moderate hydroureteronephrosis. On 6/18, pt decompensated to uroseptic shock (T 103F, BP 75/45). Emergent R Percutaneous Nephrolithotomy performed by IR on 6/18/2021, Levophed started. BP improved following procedure and LR bolus. Levophed was discontinued on 6/20, and patient has been hemodynamically stable since. Pt was weaned off HiFlow to nasal canula (6/22).     As of 6/24, pt was cleared by ID for any procedures.   On 6/25, IR was contacted for conversion of nephrostomy to nephro ureteral stent, as well assessment of PEG tube which is clogged.   On 6/28, pt underwent   Pt scheduled to undergo PCN to Nephroureteral Stent on 6/28, with PCNL on 6/29. On 6/28, IR will also assess PEG tube.     Spoke with sister Leslie on 6/28, and let her know the plan for stent and PCNL.       ASSESSMENT & PLAN:    # Septic shock 2/2 bacteremia from pyelonephritis + obstructive nephrolithiasis - resolved  # obstructive urosepsis - resolved  - s/p R PCN on 6/18  - Blood Cultures 6/18 (+) for carbap. resistant Pseudomonas and e.Fecalis; Blood Culx 6/25: NGTD  - Urine Culx 6/18 (+) pseudomonas, proteus, ESBL, s aureus, actinotignum   - as per ID reccs: c/w Meropenem and Polymyxin; as of 6/24, cleared for any procedures  - Nephrostomy tube to nephroureteral stent by IR on 6/28   - G-Tube flushed by IR on 6/28; checked on KUB w/ gastro contrast on 6/29  - PCNL procedure by Uro completed 6/29  - IR to do CT Stentogram on 6/30; NPO after 12am on 6/30!    # Pressure Ulcer on buttocks - early stage  - erythematous rash spreading over buttocks   - Offloading patient q3H     #Gross Hematuria - resolved  # Bleeding from Nephrostomy tube - resolved  - CT 6/24: appropriately positioned nephrostomy tube with decreased now mild hydroureteronephrosis  - Hb Stable: 12.2 (6/25) --> 11.0 (6/29)    #acute hypoxic respiratory failure possibly secondary to acute seizure - resolved  - CXR 6/29: Retrated L IF central cath; No evidence of cardiopulmonary disease  - TTE: EF 62%, mild aortic stenosis  - stable room air (6/29)    #Seizure disorder  #Hx of cerebral palsy/spastic paraplegia s/p PEG tube   - was a rapid response in IR for seizure on 6/18  - 6/23: IV Phenobarb changed to -->64.8mg PO phenobarbital via peg qd  - 6/25: PO phenobarbital changed to --> 60mg phenobarbital IV because peg tube clogged  - plan to transition to PO Phenobarbital on 6/30      #BPH: c/w tamsulosin   #Asthma:  c/w albuterol 90 microgram Q6h PRN     #MISC  DVT ppx: none, pending IR procedure  GI ppx: Not indicated  Diet: PEG feeds, 300ml bolus 1.2k Jevity; stop by 11pm 6/29; pt needs to be NPO for procedure on 6/30  Code Status: FULL CODE  Dispo: from nursing home    Followup:   - CXR on 6/29: L IJ central cath has retracted to L distal IVC/subclavian; DO NOT USE, and plan to remove on 6/30 after IR procedure   - As per sister Leslie, nursing home must have discharge medications for 24 hours prior to receiving patient. Please follow up to ensure safe discharge.      HOSPITAL COURSE:    65y M with PMHx of cerebral palsy, Seizure disorder, spastic paraplegia s/p PEG, BPH, Hx of Nephrolithiasis s/p pcnl x 2 (last oen in 5/20/21) with known incompetent bladder neck s/p Suprapubic Tube Placement admitted on 6/17/21 with sepsis 2/2 obstructive nephrolithiasis. CT revealed R proximal ureteral stone with moderate hydroureteronephrosis. On 6/18, pt decompensated to uroseptic shock (T 103F, BP 75/45). Emergent R Percutaneous Nephrolithotomy performed by IR on 6/18/2021, Levophed started. BP improved following procedure and LR bolus. Levophed was discontinued on 6/20, and patient has been hemodynamically stable since. Pt was weaned off HiFlow to nasal canula (6/22).     As of 6/24, pt was cleared by ID for any procedures.   On 6/25, IR was contacted for conversion of nephrostomy to nephro ureteral stent, as well assessment of PEG tube which is clogged.   On 6/28, pt underwent   Pt scheduled to undergo PCN to Nephroureteral Stent on 6/28, with PCNL on 6/29. On 6/28, IR will also assess PEG tube.     Spoke with sister Leslie on 6/28, and let her know the plan for stent and PCNL.       ASSESSMENT & PLAN:    # Septic shock 2/2 bacteremia from pyelonephritis + obstructive nephrolithiasis - resolved  # obstructive urosepsis - resolved  - s/p R PCN on 6/18  - Blood Cultures 6/18 (+) for carbap. resistant Pseudomonas and e.Fecalis; Blood Culx 6/25: NGTD  - Urine Culx 6/18 (+) pseudomonas, proteus, ESBL, s aureus, actinotignum   - as per ID reccs: c/w Meropenem and Polymyxin; as of 6/24, cleared for any procedures  - Nephrostomy tube to nephroureteral stent by IR on 6/28   - G-Tube flushed by IR on 6/28; checked on KUB w/ gastro contrast on 6/29  - PCNL procedure by Uro completed 6/29  - IR to do CT Stentogram on 6/30; NPO after 12am on 6/30!    # Rash vs. excoriations vs. trauma from bandage removal on buttocks - early stage  - erythematous rash spreading over buttocks   - Offloading patient q3H   - Wound care consult     #Gross Hematuria - resolved  # Bleeding from Nephrostomy tube - resolved  - CT 6/24: appropriately positioned nephrostomy tube with decreased now mild hydroureteronephrosis  - Hb Stable: 12.2 (6/25) --> 11.0 (6/29)    #acute hypoxic respiratory failure possibly secondary to acute seizure - resolved  - CXR 6/29: Retrated L IF central cath; No evidence of cardiopulmonary disease  - TTE: EF 62%, mild aortic stenosis  - stable room air (6/29)    #Seizure disorder  #Hx of cerebral palsy/spastic paraplegia s/p PEG tube   - was a rapid response in IR for seizure on 6/18  - 6/23: IV Phenobarb changed to -->64.8mg PO phenobarbital via peg qd  - 6/25: PO phenobarbital changed to --> 60mg phenobarbital IV because peg tube clogged  - plan to transition to PO Phenobarbital on 6/30      #BPH: c/w tamsulosin   #Asthma:  c/w albuterol 90 microgram Q6h PRN     #MISC  DVT ppx: none, pending IR procedure  GI ppx: Not indicated  Diet: PEG feeds, 300ml bolus 1.2k Jevity; stop by 11pm 6/29; pt needs to be NPO for procedure on 6/30  Code Status: FULL CODE  Dispo: from nursing home    Followup:   - CXR on 6/29: L IJ central cath has retracted to L distal IVC/subclavian; DO NOT USE, and plan to remove on 6/30 after IR procedure   - As per sister Leslie, nursing home must have discharge medications for 24 hours prior to receiving patient. Please follow up to ensure safe discharge.

## 2021-06-29 NOTE — BRIEF OPERATIVE NOTE - NSICDXBRIEFPOSTOP_GEN_ALL_CORE_FT
CC:  Sanna Walls is here today for heartburn, epigastric pain and change in BM .    Medications: medications verified and updated  Refills needed today?  NO  reports known Latex allergy or symptoms of Latex sensitivity.  Patient would like communication of their results via:    Cell Phone:   Telephone Information:   Mobile [phone]     Okay to leave a message containing results? Yes    Family History of any GI Disorders:    History of GERD: NO  History of Colon cancer: NO  History of Crohn's: NO  History of Ulcerative Colitis or Crohn's: NO  History of IBS: NO  History of Celiac: NO  History of other GI related illness or cancers such as pancreatic cancer, gallbladder problems, liver disease? NO   POST-OP DIAGNOSIS:  Right renal stone 29-Jun-2021 11:13:06  Bonnie Cooper  Right ureteral stone 29-Jun-2021 11:13:12  Bonnie Cooper

## 2021-06-29 NOTE — PROGRESS NOTE ADULT - ASSESSMENT
IMPRESSION:  Sepsis present on admission  Septic shock resolved  Obstructive pyelonephritis s/p Right PCN/ hematuria-improving, s/p nephrolithiasis  Pseudomonas & E. faecalis VR bacteremia  Chronic suprapubic alatorre  Seizure yesterday? HO Seizures  HO Cerebral palsy chronically contracted    PLAN:    CNS:  Avoid CNS depressants.     HEENT:  Oral care.  Aspiration precautions.    PULMONARY:  HOB @ 45 degrees.  Wean O2 as tolerated, Goal 94%, NC    CARDIOVASCULAR:  keep I=O    GI: GI prophylaxis. Feedings after procedure.     RENAL: FU lytes.  Correct as needed.  Monitor UO.  Monitor nephrostomy drain.    INFECTIOUS DISEASE:  ABX PER ID    HEMATOLOGICAL:  DVT prophylaxis.     ENDOCRINE:  Follow up FS.  Insulin protocol if needed.    MUSCULOSKELETAL: bed rest    DISPOSITION: SDU   IMPRESSION:  Sepsis present on admission  Septic shock resolved  Obstructive pyelonephritis s/p Right PCN   Pseudomonas & E. faecalis VR bacteremia  Chronic suprapubic alatorre  Seizure yesterday? HO Seizures  HO Cerebral palsy chronically contracted    PLAN:    CNS:  Avoid CNS depressants.     HEENT:  Oral care.  Aspiration precautions.    PULMONARY:  HOB @ 45 degrees.  Wean O2 as tolerated, Goal 94%, NC    CARDIOVASCULAR:  keep I=O    GI: GI prophylaxis. Feedings after procedure.     RENAL: FU lytes.  Correct as needed.  Monitor UO.  Monitor nephrostomy drain.    INFECTIOUS DISEASE:  ABX PER ID    HEMATOLOGICAL:  DVT prophylaxis.     ENDOCRINE:  Follow up FS.  Insulin protocol if needed.    MUSCULOSKELETAL: bed rest    DISPOSITION: SDU

## 2021-06-29 NOTE — BRIEF OPERATIVE NOTE - OPERATION/FINDINGS
ureteral stone pulled up into he renal pelvis, renal stone found in lower pole both stones removed intact, antegrade ureteroscopy clean into the bladder

## 2021-06-29 NOTE — PROGRESS NOTE ADULT - ASSESSMENT
64y/o M with RIGHT nephrolithiasis, s/p RIGHT PCNL, antegrade ureteroscopy POD # 0    - No further inpatient urologic intervention at this time   - D/w IR, will place orders for IR c/s and CT A/P non con for CT stentogram tomorrow, NPO after midnight tonight, IR to place patient on schedule for tomorrow depending on availability  - Plan discussed with medical team.

## 2021-06-30 LAB
ALBUMIN SERPL ELPH-MCNC: 3.1 G/DL — LOW (ref 3.5–5.2)
ALBUMIN SERPL ELPH-MCNC: 3.3 G/DL — LOW (ref 3.5–5.2)
ALP SERPL-CCNC: 69 U/L — SIGNIFICANT CHANGE UP (ref 30–115)
ALP SERPL-CCNC: 71 U/L — SIGNIFICANT CHANGE UP (ref 30–115)
ALT FLD-CCNC: 12 U/L — SIGNIFICANT CHANGE UP (ref 0–41)
ALT FLD-CCNC: 13 U/L — SIGNIFICANT CHANGE UP (ref 0–41)
ANION GAP SERPL CALC-SCNC: 12 MMOL/L — SIGNIFICANT CHANGE UP (ref 7–14)
ANION GAP SERPL CALC-SCNC: 13 MMOL/L — SIGNIFICANT CHANGE UP (ref 7–14)
AST SERPL-CCNC: 25 U/L — SIGNIFICANT CHANGE UP (ref 0–41)
AST SERPL-CCNC: 25 U/L — SIGNIFICANT CHANGE UP (ref 0–41)
BASOPHILS # BLD AUTO: 0.02 K/UL — SIGNIFICANT CHANGE UP (ref 0–0.2)
BASOPHILS NFR BLD AUTO: 0.2 % — SIGNIFICANT CHANGE UP (ref 0–1)
BILIRUB SERPL-MCNC: 0.3 MG/DL — SIGNIFICANT CHANGE UP (ref 0.2–1.2)
BILIRUB SERPL-MCNC: 0.4 MG/DL — SIGNIFICANT CHANGE UP (ref 0.2–1.2)
BUN SERPL-MCNC: 11 MG/DL — SIGNIFICANT CHANGE UP (ref 10–20)
BUN SERPL-MCNC: 12 MG/DL — SIGNIFICANT CHANGE UP (ref 10–20)
CALCIUM SERPL-MCNC: 8 MG/DL — LOW (ref 8.5–10.1)
CALCIUM SERPL-MCNC: 8.1 MG/DL — LOW (ref 8.5–10.1)
CHLORIDE SERPL-SCNC: 95 MMOL/L — LOW (ref 98–110)
CHLORIDE SERPL-SCNC: 95 MMOL/L — LOW (ref 98–110)
CO2 SERPL-SCNC: 28 MMOL/L — SIGNIFICANT CHANGE UP (ref 17–32)
CO2 SERPL-SCNC: 30 MMOL/L — SIGNIFICANT CHANGE UP (ref 17–32)
CREAT SERPL-MCNC: 0.5 MG/DL — LOW (ref 0.7–1.5)
CREAT SERPL-MCNC: 0.6 MG/DL — LOW (ref 0.7–1.5)
CULTURE RESULTS: SIGNIFICANT CHANGE UP
EOSINOPHIL # BLD AUTO: 0.22 K/UL — SIGNIFICANT CHANGE UP (ref 0–0.7)
EOSINOPHIL NFR BLD AUTO: 2.3 % — SIGNIFICANT CHANGE UP (ref 0–8)
GLUCOSE SERPL-MCNC: 87 MG/DL — SIGNIFICANT CHANGE UP (ref 70–99)
GLUCOSE SERPL-MCNC: 98 MG/DL — SIGNIFICANT CHANGE UP (ref 70–99)
HCT VFR BLD CALC: 32.5 % — LOW (ref 42–52)
HGB BLD-MCNC: 11.3 G/DL — LOW (ref 14–18)
IMM GRANULOCYTES NFR BLD AUTO: 1 % — HIGH (ref 0.1–0.3)
LYMPHOCYTES # BLD AUTO: 1.48 K/UL — SIGNIFICANT CHANGE UP (ref 1.2–3.4)
LYMPHOCYTES # BLD AUTO: 15.7 % — LOW (ref 20.5–51.1)
MAGNESIUM SERPL-MCNC: 0.8 MG/DL — LOW (ref 1.8–2.4)
MCHC RBC-ENTMCNC: 30.5 PG — SIGNIFICANT CHANGE UP (ref 27–31)
MCHC RBC-ENTMCNC: 34.8 G/DL — SIGNIFICANT CHANGE UP (ref 32–37)
MCV RBC AUTO: 87.8 FL — SIGNIFICANT CHANGE UP (ref 80–94)
MONOCYTES # BLD AUTO: 1.04 K/UL — HIGH (ref 0.1–0.6)
MONOCYTES NFR BLD AUTO: 11.1 % — HIGH (ref 1.7–9.3)
NEUTROPHILS # BLD AUTO: 6.55 K/UL — HIGH (ref 1.4–6.5)
NEUTROPHILS NFR BLD AUTO: 69.7 % — SIGNIFICANT CHANGE UP (ref 42.2–75.2)
NRBC # BLD: 0 /100 WBCS — SIGNIFICANT CHANGE UP (ref 0–0)
PLATELET # BLD AUTO: 287 K/UL — SIGNIFICANT CHANGE UP (ref 130–400)
POTASSIUM SERPL-MCNC: 3.4 MMOL/L — LOW (ref 3.5–5)
POTASSIUM SERPL-MCNC: 4 MMOL/L — SIGNIFICANT CHANGE UP (ref 3.5–5)
POTASSIUM SERPL-SCNC: 3.4 MMOL/L — LOW (ref 3.5–5)
POTASSIUM SERPL-SCNC: 4 MMOL/L — SIGNIFICANT CHANGE UP (ref 3.5–5)
PROT SERPL-MCNC: 5.7 G/DL — LOW (ref 6–8)
PROT SERPL-MCNC: 5.9 G/DL — LOW (ref 6–8)
RBC # BLD: 3.7 M/UL — LOW (ref 4.7–6.1)
RBC # FLD: 12.9 % — SIGNIFICANT CHANGE UP (ref 11.5–14.5)
SODIUM SERPL-SCNC: 135 MMOL/L — SIGNIFICANT CHANGE UP (ref 135–146)
SODIUM SERPL-SCNC: 138 MMOL/L — SIGNIFICANT CHANGE UP (ref 135–146)
SPECIMEN SOURCE: SIGNIFICANT CHANGE UP
WBC # BLD: 9.4 K/UL — SIGNIFICANT CHANGE UP (ref 4.8–10.8)
WBC # FLD AUTO: 9.4 K/UL — SIGNIFICANT CHANGE UP (ref 4.8–10.8)

## 2021-06-30 PROCEDURE — 99291 CRITICAL CARE FIRST HOUR: CPT

## 2021-06-30 PROCEDURE — 74176 CT ABD & PELVIS W/O CONTRAST: CPT | Mod: 26

## 2021-06-30 PROCEDURE — 50389 REMOVE RENAL TUBE W/FLUORO: CPT | Mod: RT

## 2021-06-30 RX ORDER — MAGNESIUM SULFATE 500 MG/ML
4 VIAL (ML) INJECTION ONCE
Refills: 0 | Status: DISCONTINUED | OUTPATIENT
Start: 2021-06-30 | End: 2021-06-30

## 2021-06-30 RX ORDER — POTASSIUM CHLORIDE 20 MEQ
20 PACKET (EA) ORAL
Refills: 0 | Status: DISCONTINUED | OUTPATIENT
Start: 2021-06-30 | End: 2021-06-30

## 2021-06-30 RX ORDER — POTASSIUM CHLORIDE 20 MEQ
20 PACKET (EA) ORAL
Refills: 0 | Status: COMPLETED | OUTPATIENT
Start: 2021-06-30 | End: 2021-06-30

## 2021-06-30 RX ORDER — MAGNESIUM SULFATE 500 MG/ML
2 VIAL (ML) INJECTION
Refills: 0 | Status: COMPLETED | OUTPATIENT
Start: 2021-06-30 | End: 2021-06-30

## 2021-06-30 RX ORDER — PHENOBARBITAL 60 MG
64.8 TABLET ORAL DAILY
Refills: 0 | Status: DISCONTINUED | OUTPATIENT
Start: 2021-06-30 | End: 2021-07-06

## 2021-06-30 RX ADMIN — Medication 50 GRAM(S): at 13:09

## 2021-06-30 RX ADMIN — MEROPENEM 100 MILLIGRAM(S): 1 INJECTION INTRAVENOUS at 05:55

## 2021-06-30 RX ADMIN — MEROPENEM 100 MILLIGRAM(S): 1 INJECTION INTRAVENOUS at 21:29

## 2021-06-30 RX ADMIN — POLYMYXIN B SULFATE 500 UNIT(S): 500000 INJECTION, POWDER, LYOPHILIZED, FOR SOLUTION INTRAMUSCULAR; INTRATHECAL; INTRAVENOUS; OPHTHALMIC at 18:31

## 2021-06-30 RX ADMIN — Medication 50 GRAM(S): at 17:34

## 2021-06-30 RX ADMIN — Medication 100 MILLIEQUIVALENT(S): at 17:34

## 2021-06-30 RX ADMIN — CHLORHEXIDINE GLUCONATE 1 APPLICATION(S): 213 SOLUTION TOPICAL at 05:56

## 2021-06-30 RX ADMIN — POLYMYXIN B SULFATE 500 UNIT(S): 500000 INJECTION, POWDER, LYOPHILIZED, FOR SOLUTION INTRAMUSCULAR; INTRATHECAL; INTRAVENOUS; OPHTHALMIC at 05:54

## 2021-06-30 RX ADMIN — Medication 64.8 MILLIGRAM(S): at 17:36

## 2021-06-30 RX ADMIN — TAMSULOSIN HYDROCHLORIDE 0.4 MILLIGRAM(S): 0.4 CAPSULE ORAL at 21:29

## 2021-06-30 RX ADMIN — Medication 100 MILLIEQUIVALENT(S): at 13:09

## 2021-06-30 RX ADMIN — MEROPENEM 100 MILLIGRAM(S): 1 INJECTION INTRAVENOUS at 17:37

## 2021-06-30 NOTE — PROGRESS NOTE ADULT - ASSESSMENT
IMPRESSION:  Sepsis present on admission  Septic shock resolved  Obstructive pyelonephritis s/p Right PCN adn right Percutaneous lithotripsy    Pseudomonas & E. faecalis VR bacteremia resolved   Chronic suprapubic catheter   Seizure yesterday? HO Seizures  HO Cerebral palsy chronically contracted    PLAN:    CNS:  Avoid CNS depressants.     HEENT:  Oral care.  Aspiration precautions.    PULMONARY:  HOB @ 45 degrees.  Aspiration precautions     CARDIOVASCULAR:  keep I=O     GI: GI prophylaxis. Feedings after procedure.     RENAL: FU lytes.  Correct as needed.  Monitor UO.  Monitor nephrostomy drain.  Repeat BW after electrolytes replacement     INFECTIOUS DISEASE:  ABX PER ID    HEMATOLOGICAL:  DVT prophylaxis.     ENDOCRINE:  Follow up FS.  Insulin protocol if needed.    MUSCULOSKELETAL: bed rest    DISPOSITION: Floor

## 2021-06-30 NOTE — CHART NOTE - NSCHARTNOTEFT_GEN_A_CORE
Registered Dietitian Follow-Up     Patient Profile Reviewed                           Yes [x]   No []     Nutrition History Previously Obtained        Yes [x]  No []       Pertinent Medical Interventions: Pt to go for stentogram today by IR per progress notes.     Diet order: currently NPO, previously received only 2 feeds of Jevity 1.2 300ml and prior to that was NPO 6/24-6/29.      Anthropometrics:  - Ht. 147.3cm  - Wt. 49.5kg (6/29) vs 57.4kg (6/25) vs. dosing wt. 57.4kg (6/18)--will continue to monitor wt trends closely   - %wt change  - BMI - 26.5  - IBW -     Pertinent Lab Data: (6/30): H/H 11.3/32.5, K+ 3.4, Cr. 0.5, Mg 0.8     Pertinent Meds: IV abx, Mg sulfate, K chloride, solumedrol, flomax     Physical Findings:  - Appearance: alert, disoriented; no edema noted per flow sheets   - GI function: last BM 6/28  - Tubes: PEG  - Oral/Mouth cavity: per SLP recs on 6/27--Dysphagia diet I puree consistency, Nectar-thickened liquids 1:1  - Skin: stage 2 pressure injury to sacrum (newly documented on 6/29)     Nutrition Requirements  Weight Used: dosing 57.4 kg; needs readjusted today in setting of increased needs to promote wound healing      Estimated Energy Needs: 4128-3188 kcal/day (30-35 kcal/kg CBW)  Estimated Protein Needs: 68-86 gm/day (1.2-1.5 gm/kg CBW)  Estimated Fluid Needs: per VENT team    Nutrient Intake: not meeting kcal/pro needs at this time      Previous Nutrition Diagnosis: Inadequate protein-energy intake (ongoing)     Nutrition Intervention: enteral nutrition      Recommendations:  1. When medically feasible, initiate pleasure feeds of pureed w/ nectar thick liquids and the following TF regimen: Jevity 1.2 360ml Q6H, which provides 1728kcal, 80m pro, 1162ml free water. Additional flushes per LIP.     Goal/Expected Outcome: Pt to meet % of estimated nutrient needs within 3 days      Indicator/Monitoring: RD to monitor energy intake, diet order, body composition, glucose/renal profile, NFPF

## 2021-06-30 NOTE — PROGRESS NOTE ADULT - SUBJECTIVE AND OBJECTIVE BOX
INTERVENTIONAL RADIOLOGY BRIEF-OPERATIVE NOTE    Procedure: right nephrostogram with possible removal possible exchange    Pre-Op Diagnosis: right nephrostomy evaluation    Post-Op Diagnosis: same    Attending: Temo  Resident: Luma Kraft    Anesthesia (type):  [ ] General Anesthesia  [ ] Sedation  [ ] Spinal Anesthesia  [ ] Local/Regional    Contrast: ~10cc    Estimated Blood Loss: Minimal, < 5 cc    Condition:   [ ] Critical  [ ] Serious  [ ] Fair   [ c] Good    Findings/Follow up Plan of Care: Left over the wire nephrostogram was performed demonstrating normal flow of contrast from the kidney to the bladder with no evidence of obstruction. The nephroureterostomy was removed. Pt tolerated the procedure well.     Specimens Removed: as above    Implants: none    Complications: none immediate    Disposition: discharge to floor      Please call Interventional Radiology [phone] with any questions, concerns, or issues.

## 2021-06-30 NOTE — PROGRESS NOTE ADULT - SUBJECTIVE AND OBJECTIVE BOX
HAWATISH WAYNE  65y, Male  Allergy: No Known Allergies      LOS  13d    CHIEF COMPLAINT: Nephrolithiasis (30 Jun 2021 08:59)      INTERVAL EVENTS/HPI  - No acute events overnight, s/p OR  - T(F): , Max: 98.7 (06-29-21 @ 16:00)  - Denies any worsening symptoms  - Tolerating medication  - WBC Count: 9.40 (06-30-21 @ 07:24)  WBC Count: 9.57 (06-29-21 @ 03:00)     - Creatinine, Serum: 0.5 (06-30-21 @ 07:24)  Creatinine, Serum: 0.5 (06-29-21 @ 03:00)       ROS  General: Denies rigors, nightsweats  HEENT: Denies headache, rhinorrhea, sore throat, eye pain  CV: Denies CP, palpitations  PULM: Denies wheezing, hemoptysis  GI: Denies hematemesis, hematochezia, melena  : Denies discharge, hematuria  MSK: Denies arthralgias, myalgias  SKIN: Denies rash, lesions  NEURO: Denies paresthesias, weakness  PSYCH: Denies depression, anxiety    VITALS:  T(F): 98.6, Max: 98.7 (06-29-21 @ 16:00)  HR: 86  BP: 103/57  RR: 18Vital Signs Last 24 Hrs  T(C): 37 (30 Jun 2021 07:30), Max: 37.1 (29 Jun 2021 16:00)  T(F): 98.6 (30 Jun 2021 07:30), Max: 98.7 (29 Jun 2021 16:00)  HR: 86 (30 Jun 2021 07:30) (60 - 101)  BP: 103/57 (30 Jun 2021 07:30) (102/55 - 160/85)  BP(mean): 75 (30 Jun 2021 07:30) (73 - 87)  RR: 18 (30 Jun 2021 07:30) (14 - 18)  SpO2: 100% (30 Jun 2021 07:30) (97% - 100%)    PHYSICAL EXAM:  Gen: chronically ill appearing contracted NAD, resting in bed  HEENT: Normocephalic, atraumatic  Neck: supple, no lymphadenopathy  CV: Regular rate & regular rhythm  Lungs: decreased BS at bases, no fremitus  Abdomen: Soft, BS present RPCN suprapubic   Ext: Warm, well perfused  Neuro: non focal, awake  Skin: no rash, no erythema  Lines: no phlebitis    FH: Non-contributory  Social Hx: Non-contributory    TESTS & MEASUREMENTS:                        11.3   9.40  )-----------( 287      ( 30 Jun 2021 07:24 )             32.5     06-30    138  |  95<L>  |  12  ----------------------------<  98  3.4<L>   |  30  |  0.5<L>    Ca    8.0<L>      30 Jun 2021 07:24  Mg     0.8     06-30    TPro  5.7<L>  /  Alb  3.1<L>  /  TBili  0.4  /  DBili  x   /  AST  25  /  ALT  13  /  AlkPhos  71  06-30    eGFR if Non African American: 114 mL/min/1.73M2 (06-30-21 @ 07:24)  eGFR if : 132 mL/min/1.73M2 (06-30-21 @ 07:24)    LIVER FUNCTIONS - ( 30 Jun 2021 07:24 )  Alb: 3.1 g/dL / Pro: 5.7 g/dL / ALK PHOS: 71 U/L / ALT: 13 U/L / AST: 25 U/L / GGT: x               Culture - Blood (collected 06-25-21 @ 04:30)  Source: .Blood Blood-Peripheral  Preliminary Report (06-26-21 @ 18:00):    No growth to date.    Culture - Blood (collected 06-24-21 @ 08:00)  Source: .Blood Blood-Peripheral  Final Report (06-29-21 @ 14:01):    No Growth Final    Culture - Blood (collected 06-24-21 @ 08:00)  Source: .Blood Blood-Arterial  Final Report (06-29-21 @ 14:01):    No Growth Final    Culture - Blood (collected 06-23-21 @ 05:52)  Source: .Blood None  Final Report (06-28-21 @ 19:01):    No Growth Final    Culture - Urine (collected 06-22-21 @ 16:00)  Source: Kidney Nephrostomy - Right  Final Report (06-24-21 @ 17:15):    No growth at 48 hours    Culture - Blood (collected 06-22-21 @ 06:22)  Source: .Blood None  Final Report (06-27-21 @ 13:00):    No Growth Final    Culture - Blood (collected 06-21-21 @ 07:17)  Source: .Blood None  Final Report (06-26-21 @ 19:00):    No Growth Final    Culture - Blood (collected 06-20-21 @ 19:24)  Source: .Blood Blood-Peripheral  Final Report (06-26-21 @ 01:00):    No Growth Final    Culture - Urine (collected 06-18-21 @ 14:23)  Source: Kidney Right Kidney  Final Report (06-22-21 @ 08:46):    Numerous Pseudomonas aeruginosa (Carbapenem Resistant) Multiple    Morphological Strains    Numerous Proteus mirabilis ESBL    Numerous Staphylococcus aureus    Numerous Actinotignum schaali group "Susceptibilities not performed"  Organism: Pseudomonas aeruginosa (Carbapenem Resistant)  Proteus mirabilis ESBL  Enterococcus faecalis  Staphylococcus aureus (06-22-21 @ 08:46)  Organism: Staphylococcus aureus (06-22-21 @ 08:46)      -  Ampicillin/Sulbactam: S <=8/4      -  Cefazolin: S <=4      -  Gentamicin: S <=1 Should not be used as monotherapy      -  Oxacillin: S 1      -  Penicillin: R >8      -  RIF- Rifampin: S <=1 Should not be used as monotherapy      -  Tetra/Doxy: S <=1      -  Trimethoprim/Sulfamethoxazole: S <=0.5/9.5      -  Vancomycin: S 2      Method Type: MIGUEL A  Organism: Enterococcus faecalis (06-22-21 @ 08:46)      -  Ampicillin: S <=2 Predicts results to ampicillin/sulbactam, amoxacillin-clavulanate and  piperacillin-tazobactam.      -  Ciprofloxacin: S <=1      -  Levofloxacin: S <=1      -  Tetra/Doxy: R >8      -  Vancomycin: S 2      Method Type: MIGUEL A  Organism: Proteus mirabilis ESBL (06-22-21 @ 08:46)      -  Amikacin: S <=16      -  Amoxicillin/Clavulanic Acid: S <=8/4      -  Ampicillin: R >16 These ampicillin results predict results for amoxicillin      -  Ampicillin/Sulbactam: R 8/4 Enterobacter, Citrobacter, and Serratia may develop resistance during prolonged therapy (3-4 days)      -  Aztreonam: R >16      -  Cefazolin: R >16 ([id number]) For uncomplicated UTI with K. pneumoniae, E. coli, or P. mirablis: MIGUEL A <=16 is sensitive and MIGUEL A >=32 is resistant. This also predicts results for oral agents cefaclor, cefdinir, cefpodoxime, cefprozil, cefuroxime axetil, cephalexin and locarbef for uncomplicated UTI. Note that some isolates may be susceptible to these agents while testing resistant to cefazolin.      -  Cefepime: R >16      -  Cefoxitin: S <=8      -  Ceftriaxone: R >32 Enterobacter, Citrobacter, and Serratia may develop resistance during prolonged therapy      -  Ciprofloxacin: R >2      -  Ertapenem: S <=0.5      -  Gentamicin: S <=2      -  Levofloxacin: R >4      -  Meropenem: S <=1      -  Piperacillin/Tazobactam: R <=8      -  Tobramycin: S <=2      -  Trimethoprim/Sulfamethoxazole: R >2/38      Method Type: MIGUEL A  Organism: Pseudomonas aeruginosa (Carbapenem Resistant) (06-22-21 @ 08:46)      -  Amikacin: S <=16      -  Aztreonam: S <=4      -  Cefepime: S 8      -  Ceftazidime: S 8      -  Ciprofloxacin: R >2      -  Gentamicin: S 4      -  Imipenem: R >8      -  Levofloxacin: R >4      -  Meropenem: I 4      -  Piperacillin/Tazobactam: I 32      -  Tobramycin: S <=2      Method Type: MIGUEL A    Culture - Blood (collected 06-18-21 @ 09:29)  Source: .Blood None  Gram Stain (06-19-21 @ 07:48):    Upon re-evaluation of gram stain:    Growth in aerobic and anaerobic bottles: Gram Negative Rods and Gram    Positive Cocci in Pairs and Chains  Final Report (06-22-21 @ 12:03):    Growth in aerobic and anaerobic bottles: Enterococcus faecalis    Growth in aerobic and anaerobic bottles: Pseudomonas aeruginosa    (Carbapenem Resistant)    Growth in anaerobic bottle: Proteus mirabilis ESBL    ***Blood Panel PCR results on this specimenare available    approximately 3 hours after the Gram stain result.***    Gram stain, PCR, and/or culture results may not always    correspond due to difference in methodologies.    ************************************************************    This PCR assaywas performed by multiplex PCR. This    Assay tests for 66 bacterial and resistance gene targets.    Please refer to the [facility] test directory    at [email or web address] for details.  Organism: Blood Culture PCR  Blood Culture PCR  Enterococcus faecalis  Pseudomonas aeruginosa (Carbapenem Resistant)  Proteus mirabilis ESBL (06-22-21 @ 12:07)  Organism: Proteus mirabilis ESBL (06-22-21 @ 12:07)      -  Amikacin: R >32      -  Ampicillin: R >16 These ampicillin results predict results for amoxicillin      -  Ampicillin/Sulbactam: R 8/4 Enterobacter, Citrobacter, and Serratia may develop resistance during prolonged therapy (3-4 days)      -  Aztreonam: R >16      -  Cefazolin: R >16 Enterobacter, Citrobacter, and Serratia may develop resistance during prolonged therapy (3-4 days)      -  Cefepime: R >16      -  Cefoxitin: R >16      -  Ceftazidime/Avibactam: R >16      -  Ceftolozane/tazobactam: S <=2      -  Ceftriaxone: R >32 Enterobacter, Citrobacter, and Serratia may develop resistance during prolonged therapy      -  Ciprofloxacin: R >2      -  Ertapenem: R >1      -  Gentamicin: I 8      -  Levofloxacin: R >4      -  Meropenem: I 2      -  Piperacillin/Tazobactam: R <=8      -  Tobramycin: S <=2      -  Trimethoprim/Sulfamethoxazole: R >2/38      Method Type: MIGUEL A  Organism: Pseudomonas aeruginosa (Carbapenem Resistant) (06-22-21 @ 12:07)      -  Amikacin: S <=16      -  Aztreonam: S 8      -  Cefepime: I 16      -  Ceftazidime: I 16      -  Ciprofloxacin: R >2      -  Gentamicin: S 4      -  Imipenem: R >8      -  Levofloxacin: R >4      -  Meropenem: R 8      -  Piperacillin/Tazobactam: I 64      -  Tobramycin: S <=2      Method Type: MIGUEL A  Organism: Enterococcus faecalis (06-22-21 @ 12:06)      -  Ampicillin: S <=2 Predicts results to ampicillin/sulbactam, amoxacillin-clavulanate and  piperacillin-tazobactam.      -  Gentamicin synergy: S      -  Vancomycin: S 2      Method Type: MIGUEL A  Organism: Blood Culture PCR (06-22-21 @ 12:04)      -  Pseudomonas aeruginosa: Detec      Method Type: PCR  Organism: Blood Culture PCR (06-22-21 @ 12:04)      -  Enterococcus faecalis,VRE: Detec      Method Type: PCR    Culture - Urine (collected 06-17-21 @ 16:27)  Source: .Urine Clean Catch (Midstream)  Final Report (06-21-21 @ 11:36):    >=3 organisms. Probable collection contamination.            INFECTIOUS DISEASES TESTING  COVID-19 PCR: NotDetec (06-27-21 @ 10:29)  COVID-19 PCR: NotDetec (06-17-21 @ 22:22)  Rapid RVP Result: NotDetec (04-01-21 @ 11:14)  COVID-19 PCR: NotDetec (09-14-20 @ 15:45)  COVID-19 PCR: NotDetec (09-08-20 @ 14:19)      INFLAMMATORY MARKERS  C-Reactive Protein, Serum: 120 mg/L (06-18-21 @ 09:29)      RADIOLOGY & ADDITIONAL TESTS:  I have personally reviewed the last available Chest xray  CXR  Xray Chest 1 View- PORTABLE-Urgent:   EXAM:  XR CHEST PORTABLE URGENT 1V            PROCEDURE DATE:  06/29/2021            INTERPRETATION:  Clinical History / Reason for exam: Sepsis.    Comparison : Chest radiograph earlier in the same day.    Technique/Positioning: Frontal portable.    Findings:    Support devices: Telemetry leads are seen. Left-sided central venous catheter is attempting to cross midline.    Cardiac/mediastinum/hilum: Unchanged    Lung parenchyma/Pleura: Elevation of the left hemidiaphragm with adjacent opacification.    Skeleton/soft tissues: Contrast within the stomach.    Impression:    Elevation left hemidiaphragm with adjacent atelectasis. Support devices as described.                  TODD CRUZ MD; Attending Interventional Radiologist  This document has been electronically signed. Jun 30 2021  6:54AM (06-29-21 @ 19:07)      CT      CARDIOLOGY TESTING  12 Lead ECG:   Ventricular Rate 85 BPM    Atrial Rate 85 BPM    P-R Interval 122 ms    QRS Duration 80 ms    Q-T Interval 380 ms    QTC Calculation(Bazett) 452 ms    P Axis 46 degrees    R Axis 22 degrees    T Axis -10 degrees    Diagnosis Line Normal sinus rhythm  Nonspecific T wave abnormality minor  Otherwise normal ECG    Confirmed by YESICA SALAZAR MD ([id number]) on 6/19/2021 10:46:39 AM (06-19-21 @ 07:53)  12 Lead ECG:   Ventricular Rate 112 BPM    Atrial Rate 112 BPM    P-R Interval 120 ms    QRS Duration 84 ms    Q-T Interval 348 ms    QTC Calculation(Bazett) 475 ms    P Axis 45 degrees    R Axis 36 degrees    T Axis 4 degrees    Diagnosis Line Sinus tachycardia  Otherwise normal ECG    Confirmed by EBENEZER EDDY MD ([id number]) on 6/18/2021 4:15:45 PM (06-18-21 @ 14:35)      MEDICATIONS  chlorhexidine 4% Liquid 1 Topical <User Schedule>  magnesium sulfate  IVPB 2 IV Intermittent every 1 hour  meropenem  IVPB 1000 IV Intermittent every 8 hours  PHENobarbital Injectable 60 IV Push daily  polymyxin B IVPB 504620 IV Intermittent every 12 hours  potassium chloride  20 mEq/100 mL IVPB 20 IV Intermittent every 1 hour  tamsulosin 0.4 Oral at bedtime      WEIGHT  Weight (kg): 49.5 (06-29-21 @ 02:02)  Creatinine, Serum: 0.5 mg/dL (06-30-21 @ 07:24)      ANTIBIOTICS:  meropenem  IVPB 1000 milliGRAM(s) IV Intermittent every 8 hours  polymyxin B IVPB 334038 Unit(s) IV Intermittent every 12 hours      All available historical records have been reviewed

## 2021-06-30 NOTE — ADVANCED PRACTICE NURSE CONSULT - RECOMMEDATIONS
1. Ulcerations left outer buttock-left trochanter area- Cleanse wound bed w/ normal saline, gently pat dry.  Apply thin layer of Coloplast Triad hydrophilic ointment to absorb wound exudate, provide protective layer, fill in wound depth & assist w/ autolytic debridement of devitalized tissue. Cover w/ dry gauze dressing, and foam Allevyn dressing. Apply Triad & change dressing q12h and prn for strike-through drainage or soiling. If top layer of Triad soiled, gently remove w/ perineal cleanser and re-apply ointment. Do not scrub off as this may cause further skin breakdown. Do not apply foam Allevyn dressing directly over Triad (use gauze in between) as ointment gets absorbed into dressing as opposed to being in direct contact w/ wound bed.  2. IAD b/l buttock-intergluteal cleft, perineum, posterior thighs- Continue cleansing skin w/ perineal cleanser, gently pat dry. Continue applying Coloplast Wellington Protect moisture barrier cream daily and prn after each incontinent episode.    -Assess skin/wound qshift, report changes to primary provider.     Additional recs:  -Continue turning/positioning patient from side-to-side q2h while in bed, q1h when/if OOB chair, or in accordance w/ pt's plan of care. Continue utilizing pillows and/or z-isela fluidized positioner to assist w/ turning/positioning. When/if OOB chair, utilize pillows or chair cushion to offload pressure. Continue utilizing pillow, towel/sheet roll in between contracted BLEs.   -Continue to offload heels from bed surface with soft pillow under calfs or by applying offloading boots to BLEs.   -Continue utilizing one underpad underneath patient to contain incontinence episodes; change pad when saturated/soiled.   -Continue nutrition consult for optimal wound healing & nutritional status.     Plan of Care: Primary DANIA Wan at bedside & aware of above recs. Spoke w/  covering/primary MD Carver (at [id number]) in regards to above. No further needs/recs from [facility] service at this time. Staff RN to perform routine skin/wound assessment and manage wound care. Questions or concerns or if wound worsens and reconsult needed, please contact [facility], [facility] #[phone].

## 2021-06-30 NOTE — PROGRESS NOTE ADULT - SUBJECTIVE AND OBJECTIVE BOX
Urology Progress Note:  64y/o M with RIGHT nephrolithiasis, s/p RIGHT PCNL, antegrade ureteroscopy POD # 1. Pt seen and examined at bedside with Dr. Aguilar. No acute issues at this time, resting comfortably. RIGHT PCN draining blood tinged urine.     [ x ] A 10 Point Review of Systems was negative except where noted     MEDICATIONS  (STANDING):  chlorhexidine 4% Liquid 1 Application(s) Topical <User Schedule>  magnesium sulfate  IVPB 2 Gram(s) IV Intermittent every 1 hour  meropenem  IVPB 1000 milliGRAM(s) IV Intermittent every 8 hours  PHENobarbital 64.8 milliGRAM(s) Oral daily  polymyxin B IVPB 209143 Unit(s) IV Intermittent every 12 hours  potassium chloride  20 mEq/100 mL IVPB 20 milliEquivalent(s) IV Intermittent every 1 hour  tamsulosin 0.4 milliGRAM(s) Oral at bedtime    MEDICATIONS  (PRN):  acetaminophen  Suppository .. 650 milliGRAM(s) Rectal every 6 hours PRN Temp greater or equal to 38C (100.4F)  ALBUTerol    90 MICROgram(s) HFA Inhaler 2 Puff(s) Inhalation every 6 hours PRN Bronchospasm    acetaminophen  Suppository .. 650 milliGRAM(s) Rectal every 6 hours PRN  ALBUTerol    90 MICROgram(s) HFA Inhaler 2 Puff(s) Inhalation every 6 hours PRN  chlorhexidine 4% Liquid 1 Application(s) Topical <User Schedule>  magnesium sulfate  IVPB 2 Gram(s) IV Intermittent every 1 hour  meropenem  IVPB 1000 milliGRAM(s) IV Intermittent every 8 hours  PHENobarbital 64.8 milliGRAM(s) Oral daily  polymyxin B IVPB 319114 Unit(s) IV Intermittent every 12 hours  potassium chloride  20 mEq/100 mL IVPB 20 milliEquivalent(s) IV Intermittent every 1 hour  tamsulosin 0.4 milliGRAM(s) Oral at bedtime    Vital signs  T(C): , Max: 37.1 (06-29-21 @ 16:00)  HR: 102 (06-30-21 @ 12:00)  BP: 105/61 (06-30-21 @ 12:00)  SpO2: 98% (06-30-21 @ 12:00)    PHYSICAL EXAM:  HEENT: NC/AT  Neck: no pain  Back: No CVA tenderness,  RIGHT PCN in place draining slightly blood tinged urine  Respiratory: No accessory respiratory muscle use  Abd: Soft, nontender, bladder nonpalpable  : + SP tube in place draining clear yellow urine  Extremities: no edema  Neurological: Awake and alert  Psychiatric: Normal mood, normal affect  Skin: No rashes    I&O's Summary  29 Jun 2021 07:01  -  30 Jun 2021 07:00  --------------------------------------------------------  IN: 1550 mL / OUT: 2030 mL / NET: -480 mL    Labs                      11.3   9.40  )-----------( 287      ( 30 Jun 2021 07:24 )             32.5     30 Jun 2021 07:24    138    |  95     |  12     ----------------------------<  98     3.4     |  30     |  0.5      Ca    8.0        30 Jun 2021 07:24  Mg     0.8       30 Jun 2021 07:24

## 2021-06-30 NOTE — PROGRESS NOTE ADULT - ASSESSMENT
ASSESSMENT  64 years old Male with PMHx of Cerebral Palsy, Seizure disorder, spastic paraplegia s/p PEG, BPH, Hx of Nephrolithiasis s/p pcnl x 2 with known incompetent bladder neck s/p SPT presents to ED with Right sided flank pain. Patient had Right-sided PCNL 5/20.    IMPRESSION  #RESOLVED Septic shock requiring pressors secondary to Polymicrobial bacteremia with Pseudomonas and VRE secondary to pyelonephritis secondary to obstructing stone    s/p Percutaneous removal of calculus of kidney less than 2 cm in diameter 29-Jun-2021 11:11:14 right renal Bonnie Cooper  Percutaneous removal of calculus of kidney less than 2 cm in diameter 29-Jun-2021 11:11:23 right ureteral Bonnie Cooper  Antegrade ureteroscopy 29-Jun-2021 11:11:28 right Bonnie Cooper  Change external ureteral stent 29-Jun-2021 11:11:37 right NU Bonnie Cooper  Nephrostogram 29-Jun-2021 11:11:45 right nephrostogram then stentogram Bonnie Cooper  Antegrade ureterography 29-Jun-2021  "ureteral stone pulled up into the renal pelvis, renal stone found in lower pole both stones removed intact, antegrade ureteroscopy clean into the bladder"    s/p SPT exchange and R PCN    6/22 BCX NGTD     6/21 BCX NGTD     6/19 BCX NGTD     R kidney CX   Numerous Pseudomonas aeruginosa (Carbapenem Resistant) Multiple Morphological Strains    Numerous Proteus mirabilis ESBL    Numerous Staphylococcus aureus- MSSA    Numerous Actinotignum schaali group "Susceptibilities not performed"    6/18 BCX Pseudomonas, Enterococcus faecalis VRE    UA  WBC 10     CTAP 1.  Obstructing proximal right ureteral stone with moderate hydroureteronephrosis.  2.  Bladder wall thickening. Correlate with urinalysis.  #CT Bibasilar atelectasis.  Creatinine, Serum: 0.8 (06-19-21 @ 07:11)    Weight (kg): 57.4 (06-18-21 @ 05:00)    RECOMMENDATIONS  - Hypokalemia likely secondary to polymyxin, monitor closely   - Meropenem 2g q8h IV  - Polymyxin 700,000 units q12h IV. monitor Cr & lytes 6/22-  - End abx 7/5, 7 days post-op   - Appreciate Urology consult- s/p SPT exchange 6/18  - Appreciate IR consult: s/p Percutaneous right nephrostomy      If any questions, please call or send a message on [employer] Teams  Please continue to update ID with any pertinent new laboratory or radiographic findings  Spectra [id number]

## 2021-06-30 NOTE — PROGRESS NOTE ADULT - ASSESSMENT
HOSPITAL COURSE:    65y M with PMHx of cerebral palsy, Seizure disorder, spastic paraplegia s/p PEG, BPH, Hx of Nephrolithiasis s/p pcnl x 2 (last oen in 5/20/21) with known incompetent bladder neck s/p Suprapubic Tube Placement admitted on 6/17/21 with sepsis 2/2 obstructive nephrolithiasis. CT revealed R proximal ureteral stone with moderate hydroureteronephrosis. On 6/18, pt decompensated to uroseptic shock (T 103F, BP 75/45). Emergent R Percutaneous Nephrolithotomy performed by IR on 6/18/2021, Levophed started. BP improved following procedure and LR bolus. Levophed was discontinued on 6/20, and patient has been hemodynamically stable since. Pt was weaned off HiFlow to nasal canula (6/22).     As of 6/24, pt was cleared by ID for any procedures.   On 6/25, IR was contacted for conversion of nephrostomy to nephro ureteral stent, as well assessment of PEG tube which is clogged.   On 6/28, pt underwent   Pt scheduled to undergo PCN to Nephroureteral Stent on 6/28, with PCNL on 6/29. On 6/28, IR will also assess PEG tube.     Spoke with sister Leslie on 6/28, and let her know the plan for stent and PCNL.       ASSESSMENT & PLAN:    # Septic shock 2/2 bacteremia from pyelonephritis + obstructive nephrolithiasis - resolved  # obstructive urosepsis - resolved  - s/p R PCN on 6/18  - Blood Cultures 6/18 (+) for carbap. resistant Pseudomonas and e.Fecalis; Blood Culx 6/25: NGTD  - Urine Culx 6/18 (+) pseudomonas, proteus, ESBL, s aureus, actinotignum   - as per ID reccs: c/w Meropenem and Polymyxin; as of 6/24, cleared for any procedures  - Nephrostomy tube to nephroureteral stent by IR on 6/28   - G-Tube flushed by IR on 6/28; checked on KUB w/ gastro contrast on 6/29  - PCNL procedure by Uro completed 6/29  - IR to do CT Stentogram on 6/30; NPO after 12am on 6/30!    # Rash vs. excoriations vs. trauma from bandage removal on buttocks - early stage  - erythematous rash spreading over buttocks   - Offloading patient q3H   - Wound care consult     #Gross Hematuria - resolved  # Bleeding from Nephrostomy tube - resolved  - CT 6/24: appropriately positioned nephrostomy tube with decreased now mild hydroureteronephrosis  - Hb Stable: 12.2 (6/25) --> 11.0 (6/29)    #acute hypoxic respiratory failure possibly secondary to acute seizure - resolved  - CXR 6/29: Retrated L IF central cath; No evidence of cardiopulmonary disease  - TTE: EF 62%, mild aortic stenosis  - stable room air (6/29)    #Seizure disorder  #Hx of cerebral palsy/spastic paraplegia s/p PEG tube   - was a rapid response in IR for seizure on 6/18  - 6/23: IV Phenobarb changed to -->64.8mg PO phenobarbital via peg qd  - 6/25: PO phenobarbital changed to --> 60mg phenobarbital IV because peg tube clogged  - plan to transition to PO Phenobarbital on 6/30      #BPH: c/w tamsulosin   #Asthma:  c/w albuterol 90 microgram Q6h PRN     #MISC  DVT ppx: none, pending IR procedure  GI ppx: Not indicated  Diet: PEG feeds, 300ml bolus 1.2k Jevity; stop by 11pm 6/29; pt needs to be NPO for procedure on 6/30  Code Status: FULL CODE  Dispo: from nursing home    Followup:   - CXR on 6/29: L IJ central cath has retracted to L distal IVC/subclavian; DO NOT USE, and plan to remove on 6/30 after IR procedure   - As per sister Leslie, nursing home must have discharge medications for 24 hours prior to receiving patient. Please follow up to ensure safe discharge.  HOSPITAL COURSE:    65y M with PMHx of cerebral palsy, Seizure disorder, spastic paraplegia s/p PEG, BPH, Hx of Nephrolithiasis s/p pcnl x 2 (last oen in 5/20/21) with known incompetent bladder neck s/p Suprapubic Tube Placement admitted on 6/17/21 with sepsis 2/2 obstructive nephrolithiasis. CT revealed R proximal ureteral stone with moderate hydroureteronephrosis. On 6/18, pt decompensated to uroseptic shock (T 103F, BP 75/45). Emergent R Percutaneous Nephrolithotomy performed by IR on 6/18/2021, Levophed started. BP improved following procedure and LR bolus. Levophed was discontinued on 6/20, and patient has been hemodynamically stable since. Pt was weaned off HiFlow to nasal canula (6/22).     As of 6/24, pt was cleared by ID for any procedures.   On 6/25, IR was contacted for conversion of nephrostomy to nephro ureteral stent, as well assessment of PEG tube which is clogged.   On 6/28, pt underwent   Pt scheduled to undergo PCN to Nephroureteral Stent on 6/28, with PCNL on 6/29. On 6/28, IR will also assess PEG tube.     On 6/30, pt is currently undergoing nephrostogram w/ possible removal/exchange.     Spoke with sister Leslie on 6/30, and let her know the plan.       ASSESSMENT & PLAN:    # Septic shock 2/2 bacteremia from pyelonephritis + obstructive nephrolithiasis - resolved  # obstructive urosepsis - resolved  - s/p R PCN on 6/18  - Blood Cultures 6/18 (+) for carbap. resistant Pseudomonas and e.Fecalis; Blood Culx 6/25: NGTD  - Urine Culx 6/18 (+) pseudomonas, proteus, ESBL, s aureus, actinotignum   - as per ID reccs: c/w Meropenem and Polymyxin; as of 6/24, cleared for any procedures  - Nephrostomy tube to nephroureteral stent by IR on 6/28   - G-Tube flushed by IR on 6/28; checked on KUB w/ gastro contrast on 6/29  - PCNL procedure by Uro completed 6/29  - f/u after IR procedure for nephrostogram on 6/30      # Rash vs. excoriations vs. trauma from bandage removal on buttocks - early stage  - erythematous rash over buttocks - likely due to trauma from dressing removal   - Offloading patient q3H   - Triad Hydrophilic Wound Dressing as per wound care on 6/30    #Gross Hematuria - resolved  # Bleeding from Nephrostomy tube - resolved  - CT 6/24: appropriately positioned nephrostomy tube with decreased now mild hydroureteronephrosis  - Hb Stable: 12.2 (6/25) --> 11.3 (6/30)    #acute hypoxic respiratory failure possibly secondary to acute seizure - resolved  - CXR 6/29: Retrated L IF central cath; No evidence of cardiopulmonary disease  - TTE: EF 62%, mild aortic stenosis  - stable room air (6/30)  - Plan to pull central cath on 6/30    #Hypokalemia   #Hypomagnesemia   - likely 2/2 Polymyxin   - K 3.4 (6/30 7am)  - Mg 0.8 (6/30 7am)  - repleted on 6/30; recheck lytes at 4pm labs    #Seizure disorder  #Hx of cerebral palsy/spastic paraplegia s/p PEG tube   - was a rapid response in IR for seizure on 6/18  - 6/25: PO phenobarbital changed to --> 60mg phenobarbital IV because peg tube clogged  - 6/30: Pt resumed on PO Phenobarbital      #BPH: c/w tamsulosin   #Asthma:  c/w albuterol 90 microgram Q6h PRN     #MISC  DVT ppx: none, pending IR procedure  GI ppx: Not indicated  Diet: PEG feeds, 300ml bolus 1.2k Jevity; stop by 11pm 6/29; pt needs to be NPO for procedure on 6/30  Code Status: FULL CODE  Dispo: from nursing home    Followup:   - Removal of central cath   - As per sister Leslie, nursing home must have discharge medications for 24 hours prior to receiving patient. Please follow up to ensure safe discharge.

## 2021-06-30 NOTE — PROGRESS NOTE ADULT - SUBJECTIVE AND OBJECTIVE BOX
TISH SHAIKH 65y Male  MRN#: [id number]   Hospital Day: 13d    SUBJECTIVE  Patient is a 65y old Male who presents with a chief complaint of Nephrolithiasis (30 Jun 2021 15:28)  Currently admitted to medicine with the primary diagnosis of Kidney stone      INTERVAL HPI AND OVERNIGHT EVENTS:  Patient was examined and seen at bedside. This morning he is resting comfortably in bed and reports no issues or overnight events.    REVIEW OF SYMPTOMS:  CONSTITUTIONAL: No weakness, fevers or chills; No headaches  EYES: No visual changes, eye pain, or discharge  ENT: No vertigo; No ear pain or change in hearing; No sore throat or difficulty swallowing  NECK: No pain or stiffness  RESPIRATORY: No cough, wheezing, or hemoptysis; No shortness of breath  CARDIOVASCULAR: No chest pain or palpitations  GASTROINTESTINAL: No abdominal or epigastric pain; No nausea, vomiting, or hematemesis; No diarrhea or constipation; No melena or hematochezia  GENITOURINARY: No dysuria, frequency or hematuria  MUSCULOSKELETAL: No joint pain, no muscle pain, no weakness  NEUROLOGICAL: No numbness or weakness  SKIN: No itching or rashes    OBJECTIVE  PAST MEDICAL & SURGICAL HISTORY  BPH (benign prostatic hyperplasia)    Cerebral palsy    Seizure  last seizure &gt;10 years ago    Osteoporosis    Spastic quadriplegia    Urinary calculi    Urinary retention    Asthma    S/P percutaneous endoscopic gastrostomy (PEG) tube placement    H/O cystoscopy    Suprapubic catheter      ALLERGIES:  No Known Allergies    MEDICATIONS:  STANDING MEDICATIONS  chlorhexidine 4% Liquid 1 Application(s) Topical <User Schedule>  magnesium sulfate  IVPB 2 Gram(s) IV Intermittent every 1 hour  meropenem  IVPB 1000 milliGRAM(s) IV Intermittent every 8 hours  PHENobarbital 64.8 milliGRAM(s) Oral daily  polymyxin B IVPB 435982 Unit(s) IV Intermittent every 12 hours  potassium chloride  20 mEq/100 mL IVPB 20 milliEquivalent(s) IV Intermittent every 1 hour  tamsulosin 0.4 milliGRAM(s) Oral at bedtime    PRN MEDICATIONS  acetaminophen  Suppository .. 650 milliGRAM(s) Rectal every 6 hours PRN  ALBUTerol    90 MICROgram(s) HFA Inhaler 2 Puff(s) Inhalation every 6 hours PRN      PHYSICAL EXAM:  CONSTITUTIONAL: No acute distress, well-developed, well-groomed, AAOx3  HEAD: Atraumatic, normocephalic  EYES: EOM intact, PERRLA, conjunctiva and sclera clear  ENT: Supple, no masses, no thyromegaly, no bruits, no JVD; moist mucous membranes  PULMONARY: Clear to auscultation bilaterally; no wheezes, rales, or rhonchi  CARDIOVASCULAR: Regular rate and rhythm; no murmurs, rubs, or gallops  GASTROINTESTINAL: Soft, non-tender, non-distended; bowel sounds present  MUSCULOSKELETAL: 2+ peripheral pulses; no clubbing, no cyanosis, no edema  NEUROLOGY: non-focal  SKIN: No rashes or lesions; warm and dry    VITAL SIGNS: Last 24 Hours  T(C): 37.1 (30 Jun 2021 12:00), Max: 37.1 (29 Jun 2021 16:00)  T(F): 98.7 (30 Jun 2021 12:00), Max: 98.7 (29 Jun 2021 16:00)  HR: 102 (30 Jun 2021 12:00) (86 - 102)  BP: 105/61 (30 Jun 2021 12:00) (102/55 - 118/62)  BP(mean): 79 (30 Jun 2021 12:00) (73 - 81)  RR: 18 (30 Jun 2021 12:00) (17 - 18)  SpO2: 98% (30 Jun 2021 12:00) (98% - 100%)    LABS:                        11.3   9.40  )-----------( 287      ( 30 Jun 2021 07:24 )             32.5     06-30    138  |  95<L>  |  12  ----------------------------<  98  3.4<L>   |  30  |  0.5<L>    Ca    8.0<L>      30 Jun 2021 07:24  Mg     0.8     06-30    TPro  5.7<L>  /  Alb  3.1<L>  /  TBili  0.4  /  DBili  x   /  AST  25  /  ALT  13  /  AlkPhos  71  06-30    PT/INR - ( 29 Jun 2021 03:00 )   PT: 14.70 sec;   INR: 1.28 ratio         PTT - ( 29 Jun 2021 03:00 )  PTT:27.4 sec              RADIOLOGY:       TISH SHAIKH 65y Male  MRN#: [id number]   Hospital Day: 13d    SUBJECTIVE  Patient is a 65y old Male who presents with a chief complaint of Nephrolithiasis (30 Jun 2021 15:28)  Currently admitted to medicine with the primary diagnosis of obstructive nephrolithiasis.       INTERVAL HPI AND OVERNIGHT EVENTS:  Patient was examined and seen at bedside. This morning he is resting comfortably in bed and reports no issues or overnight events.    REVIEW OF SYMPTOMS:  CONSTITUTIONAL: Denies fevers chills  RESPIRATORY: Denies cough, SOB, sputum production  CARDIOVASCULAR: Denies chest pain, palpitations  GASTROINTESTINAL: Denies abdominal pain, changes in bowel movements  GENITOURINARY: Denies pain at nephrostomy site, denies dysuria  SKIN: Admits to a sore on buttocks    OBJECTIVE  PAST MEDICAL & SURGICAL HISTORY  BPH (benign prostatic hyperplasia)    Cerebral palsy    Seizure  last seizure &gt;10 years ago    Osteoporosis    Spastic quadriplegia    Urinary calculi    Urinary retention    Asthma    S/P percutaneous endoscopic gastrostomy (PEG) tube placement    H/O cystoscopy    Suprapubic catheter      ALLERGIES:  No Known Allergies    MEDICATIONS:  STANDING MEDICATIONS  chlorhexidine 4% Liquid 1 Application(s) Topical <User Schedule>  magnesium sulfate  IVPB 2 Gram(s) IV Intermittent every 1 hour  meropenem  IVPB 1000 milliGRAM(s) IV Intermittent every 8 hours  PHENobarbital 64.8 milliGRAM(s) Oral daily  polymyxin B IVPB 138166 Unit(s) IV Intermittent every 12 hours  potassium chloride  20 mEq/100 mL IVPB 20 milliEquivalent(s) IV Intermittent every 1 hour  tamsulosin 0.4 milliGRAM(s) Oral at bedtime    PRN MEDICATIONS  acetaminophen  Suppository .. 650 milliGRAM(s) Rectal every 6 hours PRN  ALBUTerol    90 MICROgram(s) HFA Inhaler 2 Puff(s) Inhalation every 6 hours PRN      PHYSICAL EXAM:  Constitutional: pt is comfortable lying in bed; no acute distress  HEENT: Full ROM in bilateral eyes; pupils symmetrical and equal in size  Respiratory: (+) sounds in all lung fields; no crackles or wheezes appreciated  Cardiovascular: S1 S2 regular rate and rhythm; no murmurs or gallops appreciated  Gastrointestinal: (+) bowel sounds in all quadrants; abdomen is non-distended, non-tender to superficial and deep palpation  Genitourinary: Edouard Site is clean; Nephrostomy site is clean and well-dressed;   Extremities: extremities are non-edematous  Vascular: Warm and Well perfused UE and LE; no mottling or dusky skin   Skin: excoriations noted over buttocks. no exudate or weeping noted               VITAL SIGNS: Last 24 Hours  T(C): 37.1 (30 Jun 2021 12:00), Max: 37.1 (29 Jun 2021 16:00)  T(F): 98.7 (30 Jun 2021 12:00), Max: 98.7 (29 Jun 2021 16:00)  HR: 102 (30 Jun 2021 12:00) (86 - 102)  BP: 105/61 (30 Jun 2021 12:00) (102/55 - 118/62)  BP(mean): 79 (30 Jun 2021 12:00) (73 - 81)  RR: 18 (30 Jun 2021 12:00) (17 - 18)  SpO2: 98% (30 Jun 2021 12:00) (98% - 100%)    LABS:                        11.3   9.40  )-----------( 287      ( 30 Jun 2021 07:24 )             32.5     06-30    138  |  95<L>  |  12  ----------------------------<  98  3.4<L>   |  30  |  0.5<L>    Ca    8.0<L>      30 Jun 2021 07:24  Mg     0.8     06-30    TPro  5.7<L>  /  Alb  3.1<L>  /  TBili  0.4  /  DBili  x   /  AST  25  /  ALT  13  /  AlkPhos  71  06-30    PT/INR - ( 29 Jun 2021 03:00 )   PT: 14.70 sec;   INR: 1.28 ratio         PTT - ( 29 Jun 2021 03:00 )  PTT:27.4 sec      RADIOLOGY:

## 2021-06-30 NOTE — PROGRESS NOTE ADULT - ASSESSMENT
65 year old male with RIGHT nephrolithiasis, s/p RIGHT PCNL, antegrade ureteroscopy POD # 1    - Patient seen and examined at bedside with Dr. Aguilar, plan is as follows  - No further acute inpatient urologic intervention at this time   - Patient to undergo CT nephrostogram by IR today  - Cont SP tube and RIGHT PCN care - R PCN draining blood tinged urine  - Cont medical management  - F/u CT nephrostogram results

## 2021-06-30 NOTE — CHART NOTE - NSCHARTNOTEFT_GEN_A_CORE
Downgrade Note  Transfer from: Vent Unit  Transfer to:  Medicine_  Accepting physican:    MEDICATIONS:  STANDING MEDICATIONS  chlorhexidine 4% Liquid 1 Application(s) Topical <User Schedule>  meropenem  IVPB 1000 milliGRAM(s) IV Intermittent every 8 hours  PHENobarbital 64.8 milliGRAM(s) Oral daily  polymyxin B IVPB [id number] Unit(s) IV Intermittent every 12 hours  tamsulosin 0.4 milliGRAM(s) Oral at bedtime    PRN MEDICATIONS  acetaminophen  Suppository .. 650 milliGRAM(s) Rectal every 6 hours PRN  ALBUTerol    90 MICROgram(s) HFA Inhaler 2 Puff(s) Inhalation every 6 hours PRN      VITAL SIGNS: Last 24 Hours  T(C): 37.5 (30 Jun 2021 17:00), Max: 37.5 (30 Jun 2021 17:00)  T(F): 99.5 (30 Jun 2021 17:00), Max: 99.5 (30 Jun 2021 17:00)  HR: 106 (30 Jun 2021 17:00) (86 - 106)  BP: 115/56 (30 Jun 2021 17:00) (102/55 - 118/62)  BP(mean): 79 (30 Jun 2021 17:00) (73 - 79)  RR: 16 (30 Jun 2021 17:00) (16 - 18)  SpO2: 97% (30 Jun 2021 17:00) (97% - 100%)    LABS:                        11.3   9.40  )-----------( 287      ( 30 Jun 2021 07:24 )             32.5     06-30    135  |  95<L>  |  11  ----------------------------<  87  4.0   |  28  |  0.6<L>    Ca    8.1<L>      30 Jun 2021 16:00  Mg     0.8     06-30    TPro  5.9<L>  /  Alb  3.3<L>  /  TBili  0.3  /  DBili  x   /  AST  25  /  ALT  12  /  AlkPhos  69  06-30    PT/INR - ( 29 Jun 2021 03:00 )   PT: 14.70 sec;   INR: 1.28 ratio         PTT - ( 29 Jun 2021 03:00 )  PTT:27.4 sec        Culture - Surgical Swab (collected 29 Jun 2021 11:39)  Source: .Surgical Swab None  Preliminary Report (30 Jun 2021 16:08):    No growth        VENT COURSE:     65y M with PMHx of cerebral palsy, Seizure disorder, spastic paraplegia s/p PEG, BPH, Hx of Nephrolithiasis s/p pcnl x 2 (last oen in 5/20/21) with known incompetent bladder neck s/p Suprapubic Tube Placement admitted on 6/17/21 with sepsis 2/2 obstructive nephrolithiasis. CT revealed R proximal ureteral stone with moderate hydroureteronephrosis. On 6/18, pt decompensated to uroseptic shock (T 103F, BP 75/45). Emergent R Percutaneous Nephrolithotomy performed by IR on 6/18/2021, Levophed started. BP improved following procedure and LR bolus. Levophed was discontinued on 6/20, and patient has been hemodynamically stable since. Pt was weaned off HiFlow to nasal canula (6/22).     As of 6/24, pt was cleared by ID for any procedures.   On 6/25, IR was contacted for conversion of nephrostomy to nephro ureteral stent, as well assessment of PEG tube which is clogged.   On 6/28, pt underwent   Pt scheduled to undergo PCN to Nephroureteral Stent on 6/28, with PCNL on 6/29. On 6/28, IR will also assess PEG tube.     On 6/30, pt is currently undergoing nephrostogram w/ possible removal/exchange.     Spoke with sister Leslie on 6/30, and let her know the plan.         ASSESSMENT & PLAN:   # Septic shock 2/2 bacteremia from pyelonephritis + obstructive nephrolithiasis - resolved  # obstructive urosepsis - resolved  - s/p R PCN on 6/18  - Blood Cultures 6/18 (+) for carbap. resistant Pseudomonas and e.Fecalis; Blood Culx 6/25: NGTD  - Urine Culx 6/18 (+) pseudomonas, proteus, ESBL, s aureus, actinotignum   - as per ID reccs: c/w Meropenem and Polymyxin; as of 6/24, cleared for any procedures  - Nephrostomy tube to nephroureteral stent by IR on 6/28   - G-Tube flushed by IR on 6/28; checked on KUB w/ gastro contrast on 6/29  - PCNL procedure by Uro completed 6/29  - f/u after IR procedure for nephrostogram on 6/30      # Rash vs. excoriations vs. trauma from bandage removal on buttocks - early stage  - erythematous rash over buttocks - likely due to trauma from dressing removal   - Offloading patient q3H   - Triad Hydrophilic Wound Dressing as per wound care on 6/30    #Gross Hematuria - resolved  # Bleeding from Nephrostomy tube - resolved  - CT 6/24: appropriately positioned nephrostomy tube with decreased now mild hydroureteronephrosis  - Hb Stable: 12.2 (6/25) --> 11.3 (6/30)    #acute hypoxic respiratory failure possibly secondary to acute seizure - resolved  - CXR 6/29: Retrated L IF central cath; No evidence of cardiopulmonary disease  - TTE: EF 62%, mild aortic stenosis  - stable room air (6/30)  - Plan to pull central cath on 6/30    #Hypokalemia   #Hypomagnesemia   - likely 2/2 Polymyxin   - K 3.4 (6/30 7am)  - Mg 0.8 (6/30 7am)  - repleted on 6/30; 4pm labs returned K wnl (4.0)    #Seizure disorder  #Hx of cerebral palsy/spastic paraplegia s/p PEG tube   - was a rapid response in IR for seizure on 6/18  - 6/25: PO phenobarbital changed to --> 60mg phenobarbital IV because peg tube clogged  - 6/30: Pt resumed on PO Phenobarbital      #BPH: c/w tamsulosin   #Asthma:  c/w albuterol 90 microgram Q6h PRN     #MISC  DVT ppx: none, pending IR procedure  GI ppx: Not indicated  Diet: PEG feeds, 300ml bolus 1.2k Jevity; stop by 11pm 6/29; pt needs to be NPO for procedure on 6/30  Code Status: FULL CODE  Dispo: from nursing home    Followup:   - Trend electrolytes carefully, particularly K, as pt is on polymyxin   - Removal of central cath   - As per sister Leslie, nursing home must have discharge medications for 24 hours prior to receiving patient. Please follow up to ensure safe discharge.

## 2021-06-30 NOTE — ADVANCED PRACTICE NURSE CONSULT - ASSESSMENT
64 years old Male with PMHx of Cerebral Palsy, Seizure disorder, spastic paraplegia s/p PEG, BPH, Hx of Nephrolithiasis s/p pcnl x 2 with known incompetent bladder neck s/p SPT presents to ED (6/18) from group home with Right sided flank pain.  In ED, vitals were WNL, Labs significant for elevated lactate 2.2, UA + for bacteruria, LKE +.  CT A/P shows Delayed right nephrogram. Moderate right hydroureteronephrosis secondary to an obstructing right proximal ureteral stone. Stone size is difficult to measure due to extensive artifact. This stone appears to measure at least 6 mm craniocaudally (max ). He also has a right upper pole stone. Patient received 8mg of Morphine, IVF, Zofran, toradol and cefepime in ED patient reports pain is the same 10/10. To be admitted under medicine.   Patient currently admitted to medicine with primary diagnosis of Kidney stones, today is hospital day -12d; On 6/18, pt decompensated to uroseptic shock (T 103F, BP 75/45). Emergent R Percutaneous Nephrolithotomy performed by IR on 6/18/2021, Levophed started. BP improved following procedure and LR bolus. Levophed was discontinued on 6/20, and patient has been hemodynamically stable since. Pt was weaned off HiFlow to nasal canula (6/22).  On 6/25, IR was contacted for conversion of nephrostomy to nephro ureteral stent, as well assessment of PEG tube which is clogged. On 6/28, pt underwent PCN to Nephroureteral Stent on 6/28, with PCNL on 6/29. Patient   Patient currently in VENT unit, being managed for  Septic shock 2/2 bacteremia from pyelonephritis + obstructive nephrolithiasis - resolved; obstructive urosepsis - resolved; Gross Hematuria - resolved; Bleeding from Nephrostomy tube - resolved; acute hypoxic respiratory failure possibly secondary to acute seizure - resolved; Seizure disorder; Hx of cerebral palsy/spastic paraplegia s/p PEG tube; BPH; asthma.     Received patient in [facility], laying supine in bed, turned to right side (pillow under left side & underneath BLEs elevating heels, towel roll in between contracted BLEs), HOB elevated 30 degrees. Pt awake, alert, responsive, follows commands, primary RN Soco  at bedside, made aware of purpose of WOCN visit, agreeable to consult. Pt's BLEs atrophied, stiff, contracted together, foot drop, BUEs contracted.   With assistance from RN, turned patient completely to left side for skin assessment. Foam Allevyn dressings to left outer buttock, dressings removed.     Type of wound: Full thickness ulceration- etiology unknown - ? skin stripping/tearing from adhesive given wound bed presentation    Location: left outer buttock  extending to left trochanter area  Wound measurements: multiple wounds in close proximity to each other & measured all together at 10cm x 12cm x 0.3cm   Tunneling/Undermining: none  Wound bed: presenting in transverse linear pattern w/ irregular edges, wound base w/ yellow subcutaneous tissue w/ epidermal budding, areas of yellow devitalized tissue present   Wound edges: attached irregular  Periwound: intact  Wound exudate: scant amount of serous drainage present on removed dressing  Wound odor: none  Induration, erythema, warmth: none   Wound pain: no s/s pain     Incontinence associated dermatitis (IAD) to b/l buttock area, intergluteal cleft, perineum area & posterior thighs- skin denuded, erythematous, patches of darker pink colored tissue throughout, partial thickness openings x3 to left lower buttock-ischial area w/ dark pink tissue at wound bed.      Patient bedbound, very limited mobility in bed. Suprapubic catheter & right PCN in place, patient incontinent of stool. Receiving TF via PEG tube.

## 2021-06-30 NOTE — PROGRESS NOTE ADULT - SUBJECTIVE AND OBJECTIVE BOX
ENT CONSULT NOTE    REQUESTING PROVIDER:  Sudhir Sullivan, *    CHIEF COMPLAINT:  Neurologic Problem (csf rhinorrhea)       SUBJECTIVE:      Guillaume Balderas Jr. is a 41 year old male seen in consultation today at the request of Dr. Sullivan for right-sided CSF rhinorrhea. The rhinorrhea began about 6 months ago. At first, the patient thought that allergies were causing the runny nose, but became concerned when he began waking up with a soaked pillow case.  Guillaume cannot recall any inciting event. He does not have a history of facial trauma, high blood pressure, nose or sinus surgery.      Patient states that the nasal drainage increases when he leans his head forward, or blows his nose, and decreases when he rubs the bridge of his nose. Guillaume has also been having nose bleeds, and post nasal drip. At night, he experiences excessive coughing, and reports that the post nasal drip and coughing fits are causing him to lose his voice.      Patient denies changes in smell, vision, or taste, and states that the rhinorrhea does change with temperature, exercise, or eating. He has not had problems with eating, drinking, or swallowing.     HISTORIES:        REVIEW OF SYSTEMS:     Pertinent positives per HPI.    ALLERGIES:  No Known Allergies     Current Outpatient Medications   Medication Sig Dispense Refill   • Multiple Vitamins-Minerals (MULTIVITAMIN PO) Take  by mouth.       No current facility-administered medications for this visit.      No past medical history on file.     Past Surgical History:   Procedure Laterality Date   • Undescended testicle exploration       Social History     Tobacco Use   • Smoking status: Former Smoker     Packs/day: 0.00   • Smokeless tobacco: Never Used   Substance Use Topics   • Alcohol use: Yes     Alcohol/week: 5.0 standard drinks     Types: 5 Cans of beer per week     Frequency: 4 or more times a week   • Drug use: No     Family History   Problem Relation Age of Onset   •  Patient is a 65y old  Male who presents with a chief complaint of Nephrolithiasis (29 Jun 2021 15:29)        Over Night Events:  Off pressors.  On RA.  For stentogram today by IR         ROS:     All ROS are negative except HPI         PHYSICAL EXAM    ICU Vital Signs Last 24 Hrs  T(C): 37 (30 Jun 2021 07:30), Max: 37.1 (29 Jun 2021 16:00)  T(F): 98.6 (30 Jun 2021 07:30), Max: 98.7 (29 Jun 2021 16:00)  HR: 86 (30 Jun 2021 07:30) (60 - 101)  BP: 103/57 (30 Jun 2021 07:30) (102/55 - 160/85)  BP(mean): 75 (30 Jun 2021 07:30) (73 - 87)  ABP: --  ABP(mean): --  RR: 18 (30 Jun 2021 07:30) (14 - 18)  SpO2: 100% (30 Jun 2021 07:30) (97% - 100%)      CONSTITUTIONAL:  Ill appearing   NAD    ENT:   Airway patent,   Mouth with normal mucosa.   No thrush    EYES:   Pupils equal,   Round and reactive to light.    CARDIAC:   Normal rate,   Regular rhythm.    No edema      Vascular:  Normal systolic impulse  No Carotid bruits    RESPIRATORY:   No wheezing  Bilateral BS  Normal chest expansion  Not tachypneic,  No use of accessory muscles    GASTROINTESTINAL:  Abdomen soft,   Non-tender,   No guarding,   + BS    MUSCULOSKELETAL:   Range of motion is limited,  No clubbing, cyanosis    NEUROLOGICAL:   Alert \Follows simple commands.    SKIN:   Skin normal color for race,   Warm and dry.   No evidence of rash.    PSYCHIATRIC:   Normal mood and affect.   No apparent risk to self or others.    HEMATOLOGICAL:  No cervical  lymphadenopathy.  no inguinal lymphadenopathy      06-29-21 @ 07:01  -  06-30-21 @ 07:00  --------------------------------------------------------  IN:    Enteral Tube Flush: 150 mL    IV PiggyBack: 500 mL    Jevity 1.2: 900 mL  Total IN: 1550 mL    OUT:    Indwelling Catheter - Suprapubic (mL): 760 mL    Nephrostomy Tube (mL): 1270 mL  Total OUT: 2030 mL    Total NET: -480 mL          LABS:                            11.3   9.40  )-----------( 287      ( 30 Jun 2021 07:24 )             32.5                                               06-29    138  |  95<L>  |  11  ----------------------------<  70  3.3<L>   |  30  |  0.5<L>    Ca    8.3<L>      29 Jun 2021 03:00  Mg     0.9     06-29    TPro  5.6<L>  /  Alb  3.0<L>  /  TBili  0.3  /  DBili  x   /  AST  21  /  ALT  13  /  AlkPhos  62  06-29      PT/INR - ( 29 Jun 2021 03:00 )   PT: 14.70 sec;   INR: 1.28 ratio         PTT - ( 29 Jun 2021 03:00 )  PTT:27.4 sec                                                                                     LIVER FUNCTIONS - ( 29 Jun 2021 03:00 )  Alb: 3.0 g/dL / Pro: 5.6 g/dL / ALK PHOS: 62 U/L / ALT: 13 U/L / AST: 21 U/L / GGT: x                                                                                                                                       MEDICATIONS  (STANDING):  chlorhexidine 4% Liquid 1 Application(s) Topical <User Schedule>  lactated ringers. 500 milliLiter(s) (50 mL/Hr) IV Continuous <Continuous>  meropenem  IVPB 1000 milliGRAM(s) IV Intermittent every 8 hours  PHENobarbital Injectable 60 milliGRAM(s) IV Push daily  polymyxin B IVPB 214345 Unit(s) IV Intermittent every 12 hours  tamsulosin 0.4 milliGRAM(s) Oral at bedtime    MEDICATIONS  (PRN):  acetaminophen  Suppository .. 650 milliGRAM(s) Rectal every 6 hours PRN Temp greater or equal to 38C (100.4F)  ALBUTerol    90 MICROgram(s) HFA Inhaler 2 Puff(s) Inhalation every 6 hours PRN Bronchospasm      New X-rays reviewed:                                                                                  ECHO    CXR interpreted by me:       Heart disease Maternal Uncle    • Stroke Maternal Uncle    • Heart disease Maternal Grandfather    • Stroke Maternal Grandfather        PHYSICAL EXAM:     Visit Vitals  /79   Pulse 97      General: Patient appears well and in no acute distress  Head: Atraumatic and normocephalic  Face: Normal appearance. Facial movement symmetric.   Eyes: Eyelids and conjunctiva appear normal, no excessive tearing or discharge.   Ears: Ear canals are clear, tympanic membranes are pearly grey with normal light reflex, no erythema or bulge   Mouth and pharynx: Normal lips, tongue, mucosa. Pharynx and tonsils reddish pink without exudate.  Neck: Neck is supple, trachea is midline, thyroid lobes palpable, not enlarged  Mental Status: The patient is oriented to person, place, and time. He is attentive with normal concentration.       LABORATORY DATA:      12/19/2019 Beta 2 Transferrin: Detected. Detection of a beta-2 transferrin band by ERMELINDA is diagnostic for the presence of cerebrospinal fluid (CSF). Beta-2 transferrin is not detected by this methodology in normal  serum, tears, saliva, sputum, nasal, or aural fluid.      IMAGING STUDIES:      CT Sinus 1/02/20 -IMPRESSION:  1. Bony defect identified along the lateral wall of the right aspect of the  sphenoid sinus, connecting with the medial inferior aspect of the right  middle cranial fossa. This is likely the source of patient's CSF  rhinorrhea.     2. There is associated fluid in the right aspect of the sphenoid sinus with  an air-fluid level present.     3. Minimal scattered paranasal sinus mucosal disease.     4. Small bilateral Han cells and nasal septum deviated to the left.      PROCEDURE:    Diagnostic Nasal Endoscopy - Findings: Examination of the right nasal cavity shows a patent middle meatus, a patent sphenoid ethmoidal recess, and  patent eustachian tube.  Mucosa appears to be healthy.  Examination of left nasal cavity showed a patent middle meatus with a patent  sphenoid ethmoidal recess and patent eustachian tube.  Mucosa all appears to be healthy.  There is some clear drainage extending from the right sphenoid down to the nasal cavity floor consistent with a CSF leak that has already been identified.       IMPRESSION/PLAN:  Guillaume Balderas is a 41 year-old male presenting with right-sided CSF rhinorrhea. Since the patient does not have a history of facial trauma, surgery, or neoplasm, the spontaneous CSF leak is most likely caused by idiopathic intracranial hypertension resulting from decreased CSF reabsorption.     Endoscopic surgical repair of the CSF leak was discussed with the patient. The risks of bleeding, infection, anesthesia, injury to brain and eye were explained. Patient was also informed that he may need a  shunt to drain the extra CSF fluid from the brain into the abdomen, in order to lower the intracranial pressure and avoid injury to the brain.     Expectations after surgery were explained: patient will remain in the hospital for 3 days, recovery will take a minimum of 6 weeks, no heavy lifting will be allowed for 1 month.      MRI of face with contrast and labs (prothrombin time, partial thromboplastin time, BMP, CBC with differential) were ordered.     Consultant Note:    The documentation recorded by the scribe accurately and completely reflects the service(s) I personally performed and the decisions made by me.  A separate note will be dictated on the nasal endoscopy.    Sincerely,    Marcelino Quiros MD  Otolaryngology - Head and Neck Surgery  Rhinology and Endoscopic Skull Base Surgery

## 2021-07-01 LAB
ALBUMIN SERPL ELPH-MCNC: 3.1 G/DL — LOW (ref 3.5–5.2)
ALBUMIN SERPL ELPH-MCNC: 3.1 G/DL — LOW (ref 3.5–5.2)
ALP SERPL-CCNC: 66 U/L — SIGNIFICANT CHANGE UP (ref 30–115)
ALP SERPL-CCNC: 72 U/L — SIGNIFICANT CHANGE UP (ref 30–115)
ALT FLD-CCNC: 11 U/L — SIGNIFICANT CHANGE UP (ref 0–41)
ALT FLD-CCNC: 12 U/L — SIGNIFICANT CHANGE UP (ref 0–41)
ANION GAP SERPL CALC-SCNC: 10 MMOL/L — SIGNIFICANT CHANGE UP (ref 7–14)
ANION GAP SERPL CALC-SCNC: 13 MMOL/L — SIGNIFICANT CHANGE UP (ref 7–14)
AST SERPL-CCNC: 21 U/L — SIGNIFICANT CHANGE UP (ref 0–41)
AST SERPL-CCNC: 24 U/L — SIGNIFICANT CHANGE UP (ref 0–41)
BASOPHILS # BLD AUTO: 0.03 K/UL — SIGNIFICANT CHANGE UP (ref 0–0.2)
BASOPHILS NFR BLD AUTO: 0.3 % — SIGNIFICANT CHANGE UP (ref 0–1)
BILIRUB SERPL-MCNC: 0.3 MG/DL — SIGNIFICANT CHANGE UP (ref 0.2–1.2)
BILIRUB SERPL-MCNC: 0.4 MG/DL — SIGNIFICANT CHANGE UP (ref 0.2–1.2)
BUN SERPL-MCNC: 10 MG/DL — SIGNIFICANT CHANGE UP (ref 10–20)
BUN SERPL-MCNC: 10 MG/DL — SIGNIFICANT CHANGE UP (ref 10–20)
CALCIUM SERPL-MCNC: 8.2 MG/DL — LOW (ref 8.5–10.1)
CALCIUM SERPL-MCNC: 8.2 MG/DL — LOW (ref 8.5–10.1)
CHLORIDE SERPL-SCNC: 93 MMOL/L — LOW (ref 98–110)
CHLORIDE SERPL-SCNC: 95 MMOL/L — LOW (ref 98–110)
CO2 SERPL-SCNC: 29 MMOL/L — SIGNIFICANT CHANGE UP (ref 17–32)
CO2 SERPL-SCNC: 31 MMOL/L — SIGNIFICANT CHANGE UP (ref 17–32)
CREAT SERPL-MCNC: 0.5 MG/DL — LOW (ref 0.7–1.5)
CREAT SERPL-MCNC: 0.6 MG/DL — LOW (ref 0.7–1.5)
EOSINOPHIL # BLD AUTO: 0.28 K/UL — SIGNIFICANT CHANGE UP (ref 0–0.7)
EOSINOPHIL NFR BLD AUTO: 3 % — SIGNIFICANT CHANGE UP (ref 0–8)
GLUCOSE BLDC GLUCOMTR-MCNC: 115 MG/DL — HIGH (ref 70–99)
GLUCOSE SERPL-MCNC: 119 MG/DL — HIGH (ref 70–99)
GLUCOSE SERPL-MCNC: 140 MG/DL — HIGH (ref 70–99)
HCT VFR BLD CALC: 32.5 % — LOW (ref 42–52)
HCT VFR BLD CALC: 33 % — LOW (ref 42–52)
HGB BLD-MCNC: 11.1 G/DL — LOW (ref 14–18)
HGB BLD-MCNC: 11.5 G/DL — LOW (ref 14–18)
IMM GRANULOCYTES NFR BLD AUTO: 0.7 % — HIGH (ref 0.1–0.3)
LYMPHOCYTES # BLD AUTO: 1.31 K/UL — SIGNIFICANT CHANGE UP (ref 1.2–3.4)
LYMPHOCYTES # BLD AUTO: 13.9 % — LOW (ref 20.5–51.1)
MAGNESIUM SERPL-MCNC: 1.4 MG/DL — LOW (ref 1.8–2.4)
MAGNESIUM SERPL-MCNC: 1.6 MG/DL — LOW (ref 1.8–2.4)
MCHC RBC-ENTMCNC: 30.3 PG — SIGNIFICANT CHANGE UP (ref 27–31)
MCHC RBC-ENTMCNC: 30.8 PG — SIGNIFICANT CHANGE UP (ref 27–31)
MCHC RBC-ENTMCNC: 34.2 G/DL — SIGNIFICANT CHANGE UP (ref 32–37)
MCHC RBC-ENTMCNC: 34.8 G/DL — SIGNIFICANT CHANGE UP (ref 32–37)
MCV RBC AUTO: 88.5 FL — SIGNIFICANT CHANGE UP (ref 80–94)
MCV RBC AUTO: 88.8 FL — SIGNIFICANT CHANGE UP (ref 80–94)
MONOCYTES # BLD AUTO: 0.92 K/UL — HIGH (ref 0.1–0.6)
MONOCYTES NFR BLD AUTO: 9.8 % — HIGH (ref 1.7–9.3)
NEUTROPHILS # BLD AUTO: 6.8 K/UL — HIGH (ref 1.4–6.5)
NEUTROPHILS NFR BLD AUTO: 72.3 % — SIGNIFICANT CHANGE UP (ref 42.2–75.2)
NRBC # BLD: 0 /100 WBCS — SIGNIFICANT CHANGE UP (ref 0–0)
NRBC # BLD: 0 /100 WBCS — SIGNIFICANT CHANGE UP (ref 0–0)
PLATELET # BLD AUTO: 269 K/UL — SIGNIFICANT CHANGE UP (ref 130–400)
PLATELET # BLD AUTO: 307 K/UL — SIGNIFICANT CHANGE UP (ref 130–400)
POTASSIUM SERPL-MCNC: 3.5 MMOL/L — SIGNIFICANT CHANGE UP (ref 3.5–5)
POTASSIUM SERPL-MCNC: 3.5 MMOL/L — SIGNIFICANT CHANGE UP (ref 3.5–5)
POTASSIUM SERPL-SCNC: 3.5 MMOL/L — SIGNIFICANT CHANGE UP (ref 3.5–5)
POTASSIUM SERPL-SCNC: 3.5 MMOL/L — SIGNIFICANT CHANGE UP (ref 3.5–5)
PROT SERPL-MCNC: 5.7 G/DL — LOW (ref 6–8)
PROT SERPL-MCNC: 5.9 G/DL — LOW (ref 6–8)
RBC # BLD: 3.66 M/UL — LOW (ref 4.7–6.1)
RBC # BLD: 3.73 M/UL — LOW (ref 4.7–6.1)
RBC # FLD: 12.9 % — SIGNIFICANT CHANGE UP (ref 11.5–14.5)
RBC # FLD: 13 % — SIGNIFICANT CHANGE UP (ref 11.5–14.5)
SODIUM SERPL-SCNC: 134 MMOL/L — LOW (ref 135–146)
SODIUM SERPL-SCNC: 137 MMOL/L — SIGNIFICANT CHANGE UP (ref 135–146)
WBC # BLD: 8.78 K/UL — SIGNIFICANT CHANGE UP (ref 4.8–10.8)
WBC # BLD: 9.41 K/UL — SIGNIFICANT CHANGE UP (ref 4.8–10.8)
WBC # FLD AUTO: 8.78 K/UL — SIGNIFICANT CHANGE UP (ref 4.8–10.8)
WBC # FLD AUTO: 9.41 K/UL — SIGNIFICANT CHANGE UP (ref 4.8–10.8)

## 2021-07-01 PROCEDURE — 99232 SBSQ HOSP IP/OBS MODERATE 35: CPT

## 2021-07-01 PROCEDURE — 99231 SBSQ HOSP IP/OBS SF/LOW 25: CPT | Mod: 24

## 2021-07-01 RX ORDER — POTASSIUM CHLORIDE 20 MEQ
20 PACKET (EA) ORAL
Refills: 0 | Status: COMPLETED | OUTPATIENT
Start: 2021-07-01 | End: 2021-07-01

## 2021-07-01 RX ORDER — MAGNESIUM SULFATE 500 MG/ML
2 VIAL (ML) INJECTION ONCE
Refills: 0 | Status: COMPLETED | OUTPATIENT
Start: 2021-07-01 | End: 2021-07-01

## 2021-07-01 RX ADMIN — POLYMYXIN B SULFATE 500 UNIT(S): 500000 INJECTION, POWDER, LYOPHILIZED, FOR SOLUTION INTRAMUSCULAR; INTRATHECAL; INTRAVENOUS; OPHTHALMIC at 17:42

## 2021-07-01 RX ADMIN — CHLORHEXIDINE GLUCONATE 1 APPLICATION(S): 213 SOLUTION TOPICAL at 05:24

## 2021-07-01 RX ADMIN — Medication 64.8 MILLIGRAM(S): at 12:29

## 2021-07-01 RX ADMIN — MEROPENEM 100 MILLIGRAM(S): 1 INJECTION INTRAVENOUS at 05:24

## 2021-07-01 RX ADMIN — POLYMYXIN B SULFATE 500 UNIT(S): 500000 INJECTION, POWDER, LYOPHILIZED, FOR SOLUTION INTRAMUSCULAR; INTRATHECAL; INTRAVENOUS; OPHTHALMIC at 05:24

## 2021-07-01 RX ADMIN — Medication 50 GRAM(S): at 11:14

## 2021-07-01 RX ADMIN — MEROPENEM 100 MILLIGRAM(S): 1 INJECTION INTRAVENOUS at 13:08

## 2021-07-01 RX ADMIN — Medication 50 MILLIEQUIVALENT(S): at 13:51

## 2021-07-01 RX ADMIN — MEROPENEM 100 MILLIGRAM(S): 1 INJECTION INTRAVENOUS at 22:10

## 2021-07-01 NOTE — SWALLOW BEDSIDE ASSESSMENT ADULT - SWALLOW EVAL: DIAGNOSIS
moderate oral dysphagia for puree and honey thick liquids w/o immediate overt s/s penetration/aspiration.

## 2021-07-01 NOTE — SWALLOW BEDSIDE ASSESSMENT ADULT - COMMENTS
Spoke w/ RN Allison Meyer ([phone]) at the [facility], who reported that patient was placed on puree and honey thick liquids 1 year ago at a nursing home in [place]. When he came back to the [facility], he was received 3 meals a day of puree and honey thick liquids, w/ bolus PEG feeds to supplement PO. No reports of s/s dysphagia during PO intake and no history of pneumonia. Pt reportedly w/ a good appetite.

## 2021-07-01 NOTE — PROGRESS NOTE ADULT - ATTENDING COMMENTS
Pt seen and examined 6/27/21 ct ap images visualized demonstrating obstructing ureteral stone burden. no signif renal stone  plan for pcnu conversion tomorrow w IR
I personally saw and examined the patient, reviewed the chart and available data. I discussed the situation with the patient and the [facility] unit nurse as well as PA staff and earlier to day with Dr. Plascencia several times. I also reviewed and/or amended the note as necessary.
IMPRESSION:  Sepsis present on admission  Septic shock resolved  Obstructive pyelonephritis s/p Right PCN   Pseudomonas & E. faecalis VR bacteremia  Chronic suprapubic alatorre  Seizure yesterday? HO Seizures  HO Cerebral palsy chronically contracted    Plan as outlined above
IMPRESSION:  Sepsis present on admission  Septic shock resolved  Obstructive pyelonephritis s/p Right PCN adn right Percutaneous lithotripsy    Pseudomonas & E. faecalis VR bacteremia resolved   Chronic suprapubic catheter   Seizure yesterday? HO Seizures  HO Cerebral palsy chronically contracted    Plan as outlined above
pt seen and examined on June 24th  agree with above  appreciate ID evaluation  PCNL scheduled for tuesday if cleared medically
seen on july 1st  agree with above  doing well s/p right pcnl  no acute issues  no further gu follow needed  now with sacral bruising-- wound care per wound care team/medicine team
I was physically present for the key portions of the evaluation and management [ E/M] service provided and agree with the plan which i have reviewed and edited where appropriate     A= sepsis    = Neurogenic bladder    = right Nephrolithiasis -- residual     Plan= IV  ABT         = Eventual right PCNL
events noted, cx reviewed, IV ABX, lasix, aspiration precaution, SDU
events noted, septic shock/ bacteremia/ hydronephrosis, sp PCN, ABX, taper pressors keep neg balance as tolerated, poor prognosis
I personally saw and examined the patient, reviewed the chart and available data. I discussed the situation with the patient and his aide as well as the bedside nurse and  pa Staff. I also reviewed and/or amended the note as necessary.
IMPRESSION:    Sepsis present on admission  Septic shock resolved  Obstructive pyelonephritis s/p Right PCN/ hematuria-improving, going for stent today  Pseudomonas & E. faecalis VR bacteremia  Chronic suprapubic alatorre  Seizure yesterday? HO Seizures  HO Cerebral palsy chronically contracted    Plan as outlined above
I personally saw and examined the patient, reviewed the chart and available data. I discussed the situation with the patient and his aides as wella sGU PA's and ID / ICU team. I also reviewed and/or amended the note as necessary.

## 2021-07-01 NOTE — PROGRESS NOTE ADULT - ASSESSMENT
65 year old male with RIGHT nephrolithiasis, s/p RIGHT PCNL, antegrade ureteroscopy POD # 2. Pt seen and examined at bedside with Dr. Joyner. No acute issues at this time, resting comfortably. RIGHT urostomy bag in place with trace yellow urine. Patient downgraded to the floor in stable condition.    Plan:  - Patient seen and examined at bedside with Dr. Joyner, plan is as follows  - No further acute inpatient urologic intervention at this time   - Patient RIGHT nephrostomy was removed and a urostomy bag was placed at the site  - Cont to monitor output from RIGHT urostomy bag  - Cont SP tube and SP tube care  - Cont medical management  - Cont wound care recs, per medicine note wound likely due to trauma 2/2 to dressing removal

## 2021-07-01 NOTE — PROGRESS NOTE ADULT - SUBJECTIVE AND OBJECTIVE BOX
TISH SHAIKH 65y Male  MRN#: [id number]   Hospital Day: 14d    SUBJECTIVE  Patient is a 65y old Male who presents with a chief complaint of Nephrolithiasis (01 Jul 2021 09:32)  Currently admitted to medicine with the primary diagnosis obstructive nephrolithiasis.       INTERVAL HPI AND OVERNIGHT EVENTS:  Patient was examined and seen at bedside. This morning he is resting comfortably in bed and reports no issues or overnight events.    REVIEW OF SYMPTOMS:  CONSTITUTIONAL: Denies fevers chills  RESPIRATORY: Denies cough, SOB, sputum production  CARDIOVASCULAR: Denies chest pain, palpitations  GASTROINTESTINAL: Denies abdominal pain, changes in bowel movements  GENITOURINARY: Denies pain at nephrostomy site, denies dysuria  SKIN: Admits to a sore on buttocks    OBJECTIVE  PAST MEDICAL & SURGICAL HISTORY  BPH (benign prostatic hyperplasia)    Cerebral palsy    Seizure  last seizure &gt;10 years ago    Osteoporosis    Spastic quadriplegia    Urinary calculi    Urinary retention    Asthma    S/P percutaneous endoscopic gastrostomy (PEG) tube placement    H/O cystoscopy    Suprapubic catheter      ALLERGIES:  No Known Allergies    MEDICATIONS:  STANDING MEDICATIONS  chlorhexidine 4% Liquid 1 Application(s) Topical <User Schedule>  meropenem  IVPB 1000 milliGRAM(s) IV Intermittent every 8 hours  PHENobarbital 64.8 milliGRAM(s) Oral daily  polymyxin B IVPB [id number] Unit(s) IV Intermittent every 12 hours  tamsulosin 0.4 milliGRAM(s) Oral at bedtime    PRN MEDICATIONS  acetaminophen  Suppository .. 650 milliGRAM(s) Rectal every 6 hours PRN  ALBUTerol    90 MICROgram(s) HFA Inhaler 2 Puff(s) Inhalation every 6 hours PRN      PHYSICAL EXAM:  Constitutional: pt is comfortable lying in bed; no acute distress  HEENT: Full ROM in bilateral eyes; pupils symmetrical and equal in size  Respiratory: (+) sounds in all lung fields; no crackles or wheezes appreciated  Cardiovascular: S1 S2 regular rate and rhythm; no murmurs or gallops appreciated  Gastrointestinal: (+) bowel sounds in all quadrants; abdomen is non-distended, non-tender to superficial and deep palpation  Genitourinary: Edouard Site is clean; Nephrostomy site is clean and well-dressed;   Extremities: extremities are non-edematous  Vascular: Warm and Well perfused UE and LE; no mottling or dusky skin   Skin: excoriations noted over buttocks. no exudate or weeping noted       VITAL SIGNS: Last 24 Hours  T(C): 37.4 (01 Jul 2021 15:23), Max: 37.4 (01 Jul 2021 03:41)  T(F): 99.4 (01 Jul 2021 15:23), Max: 99.4 (01 Jul 2021 15:23)  HR: 113 (01 Jul 2021 15:23) (82 - 113)  BP: 110/68 (01 Jul 2021 15:23) (98/54 - 116/56)  BP(mean): 84 (01 Jul 2021 15:23) (79 - 84)  RR: 20 (01 Jul 2021 15:23) (18 - 20)  SpO2: 99% (01 Jul 2021 12:00) (99% - 99%)    LABS:                        11.5   8.78  )-----------( 269      ( 01 Jul 2021 08:24 )             33.0     07-01    134<L>  |  93<L>  |  10  ----------------------------<  140<H>  3.5   |  31  |  0.6<L>    Ca    8.2<L>      01 Jul 2021 08:24  Mg     1.4     07-01    TPro  5.7<L>  /  Alb  3.1<L>  /  TBili  0.3  /  DBili  x   /  AST  21  /  ALT  12  /  AlkPhos  72  07-01      Culture - Surgical Swab (collected 29 Jun 2021 11:39)  Source: .Surgical Swab None  Preliminary Report (30 Jun 2021 16:08):    No growth      RADIOLOGY:

## 2021-07-01 NOTE — PROGRESS NOTE ADULT - SUBJECTIVE AND OBJECTIVE BOX
Patient is a 65y old  Male who presents with a chief complaint of Nephrolithiasis (30 Jun 2021 15:49)        Over Night Events:  no events overnight        ROS:     All ROS are negative except HPI         PHYSICAL EXAM    ICU Vital Signs Last 24 Hrs  T(C): 37.4 (01 Jul 2021 08:00), Max: 37.5 (30 Jun 2021 17:00)  T(F): 99.3 (01 Jul 2021 08:00), Max: 99.5 (30 Jun 2021 17:00)  HR: 99 (01 Jul 2021 08:00) (91 - 106)  BP: 116/56 (01 Jul 2021 08:00) (98/54 - 116/56)  BP(mean): 79 (01 Jul 2021 08:00) (79 - 84)  ABP: --  ABP(mean): --  RR: 18 (01 Jul 2021 08:00) (16 - 18)  SpO2: 99% (01 Jul 2021 08:00) (97% - 99%)      CONSTITUTIONAL:  chronically ill appearing in  NAD    ENT:   Airway patent,   Mouth with normal mucosa.   No thrush  on room air    EYES:   Pupils equal,   Round and reactive to light.    CARDIAC:   Normal rate,   Regular rhythm.    No edema      RESPIRATORY:   No wheezing  Bilateral BS  Normal chest expansion  Not tachypneic,  No use of accessory muscles    GASTROINTESTINAL:  Abdomen soft,   Non-tender,   No guarding,     MUSCULOSKELETAL:   Range of motion is not limited,  No clubbing, cyanosis    NEUROLOGICAL:   Alert and oriented   contracted    SKIN:   Skin normal color for race,   Warm and dry and intact.   No evidence of rash.    PSYCHIATRIC:   Normal mood and affect.   No apparent risk to self or others.        06-30-21 @ 07:01  -  07-01-21 @ 07:00  --------------------------------------------------------  IN:    IV PiggyBack: 350 mL  Total IN: 350 mL    OUT:    Indwelling Catheter - Suprapubic (mL): 810 mL    Nephrostomy Tube (mL): 500 mL  Total OUT: 1310 mL    Total NET: -960 mL          LABS:                            11.5   8.78  )-----------( 269      ( 01 Jul 2021 08:24 )             33.0                                               07-01    134<L>  |  93<L>  |  10  ----------------------------<  140<H>  3.5   |  31  |  0.6<L>    Ca    8.2<L>      01 Jul 2021 08:24  Mg     1.4     07-01    TPro  5.7<L>  /  Alb  3.1<L>  /  TBili  0.3  /  DBili  x   /  AST  21  /  ALT  12  /  AlkPhos  72  07-01                                                                                           LIVER FUNCTIONS - ( 01 Jul 2021 08:24 )  Alb: 3.1 g/dL / Pro: 5.7 g/dL / ALK PHOS: 72 U/L / ALT: 12 U/L / AST: 21 U/L / GGT: x                                                  Culture - Surgical Swab (collected 29 Jun 2021 11:39)  Source: .Surgical Swab None  Preliminary Report (30 Jun 2021 16:08):    No growth                                                                                           MEDICATIONS  (STANDING):  chlorhexidine 4% Liquid 1 Application(s) Topical <User Schedule>  meropenem  IVPB 1000 milliGRAM(s) IV Intermittent every 8 hours  PHENobarbital 64.8 milliGRAM(s) Oral daily  polymyxin B IVPB [id number] Unit(s) IV Intermittent every 12 hours  tamsulosin 0.4 milliGRAM(s) Oral at bedtime    MEDICATIONS  (PRN):  acetaminophen  Suppository .. 650 milliGRAM(s) Rectal every 6 hours PRN Temp greater or equal to 38C (100.4F)  ALBUTerol    90 MICROgram(s) HFA Inhaler 2 Puff(s) Inhalation every 6 hours PRN Bronchospasm      New X-rays reviewed:                                                                                  ECHO    CXR interpreted by me:

## 2021-07-01 NOTE — PROGRESS NOTE ADULT - SUBJECTIVE AND OBJECTIVE BOX
Urology Progress Note: 65 year old male with RIGHT nephrolithiasis, s/p RIGHT PCNL, antegrade ureteroscopy POD # 2. Pt seen and examined at bedside with Dr. Joyner. No acute issues at this time, resting comfortably. Patient had his RIGHT PCN removed by IR yesterday. RIGHT urostomy bag in place with trace yellow urine. Patient was downgraded to the floor on 7/1/21 in stable condition.     [ x ] A 10 Point Review of Systems was negative except where noted     MEDICATIONS  (STANDING):  chlorhexidine 4% Liquid 1 Application(s) Topical <User Schedule>  meropenem  IVPB 1000 milliGRAM(s) IV Intermittent every 8 hours  PHENobarbital 64.8 milliGRAM(s) Oral daily  polymyxin B IVPB [id number] Unit(s) IV Intermittent every 12 hours  tamsulosin 0.4 milliGRAM(s) Oral at bedtime    MEDICATIONS  (PRN):  acetaminophen  Suppository .. 650 milliGRAM(s) Rectal every 6 hours PRN Temp greater or equal to 38C (100.4F)  ALBUTerol    90 MICROgram(s) HFA Inhaler 2 Puff(s) Inhalation every 6 hours PRN Bronchospasm    acetaminophen  Suppository .. 650 milliGRAM(s) Rectal every 6 hours PRN  ALBUTerol    90 MICROgram(s) HFA Inhaler 2 Puff(s) Inhalation every 6 hours PRN  chlorhexidine 4% Liquid 1 Application(s) Topical <User Schedule>  meropenem  IVPB 1000 milliGRAM(s) IV Intermittent every 8 hours  PHENobarbital 64.8 milliGRAM(s) Oral daily  polymyxin B IVPB 668903 Unit(s) IV Intermittent every 12 hours  tamsulosin 0.4 milliGRAM(s) Oral at bedtime    Vital signs  T(C): , Max: 37.4 (07-01-21 @ 03:41)  HR: 113 (07-01-21 @ 15:23)  BP: 110/68 (07-01-21 @ 15:23)  SpO2: 99% (07-01-21 @ 12:00)    PHYSICAL EXAM:  HEENT: NC/AT  Neck: no pain  Back: No CVA tenderness b/l, +RIGHT urostomy bag in place with trace yellow urine, +erythema noted to lower back upper buttock region with dressing in place  Respiratory: No accessory respiratory muscle use  Abd: Soft, nontender, bladder nonpalpable  : + SP tube in place draining clear yellow urine  Extremities: no edema  Neurological: Awake and alert  Psychiatric: Normal mood, normal affect  Skin: No rashes    I&O's Summary  30 Jun 2021 07:01  -  01 Jul 2021 07:00  --------------------------------------------------------  IN: 350 mL / OUT: 1310 mL / NET: -960 mL    01 Jul 2021 07:01  -  01 Jul 2021 20:06  --------------------------------------------------------  IN: 900 mL / OUT: 900 mL / NET: 0 mL    Labs             11.5   8.78  )-----------( 269      ( 01 Jul 2021 08:24 )             33.0     01 Jul 2021 08:24    134    |  93     |  10     ----------------------------<  140    3.5     |  31     |  0.6      Ca    8.2        01 Jul 2021 08:24  Mg     1.4       01 Jul 2021 08:24    RADIOLOGY & ADDITIONAL STUDIES:    EXAM:  CT ABDOMEN AND PELVIS          *** ADDENDUM 06/30/2021  ***    Described unchanged nonobstructing right upper renal pole calculus measures 0.1 x 0.4 x 0.1 cm.    Spoke with DEBORAH SIMS MD on 6/30/2021 3:51 PM.    *** END OF ADDENDUM 06/30/2021  ***        PROCEDURE DATE:  06/30/2021            INTERPRETATION:  CLINICAL STATEMENT: Nephrolithiasis.    TECHNIQUE: Contiguous axial CT images were obtained from the lower chest to the pubic symphysis without intravenous contrast.  Oral contrast was not administered.  Reformatted images in the coronal and sagittal planes were acquired.    Comparison made with CT abdomen and pelvis June 24, 2021.    FINDINGS:    LOWER CHEST: Bibasilar subsegmental atelectasis. Resolved bilateral pleuraleffusions.    HEPATOBILIARY: Unchanged.    SPLEEN: Unchanged.    PANCREAS: Unchanged.    ADRENAL GLANDS: Unchanged.    KIDNEYS: Right internal-external nephroureteral catheter, with previously seen 2 right renal pelvic/UPJ calculi no longer visualized. Resolved mild right hydronephrosis. Unchanged nonobstructing 0.4 cm calculus. Unchanged punctate nonobstructing left renal calculi.    ABDOMINOPELVIC NODES: Unchanged.    PELVIC ORGANS: Post suprapubic urinary bladder catheter, unchanged. Urinary bladder nondistended. Prostate gland unchanged.    PERITONEUM/MESENTERY/BOWEL: Unchanged.    BONES/SOFT TISSUES: Unchanged.      IMPRESSION:  Since June 24, 2021:    1. Right internal-external nephroureteral catheter, with previously seen 2 right renal pelvic/UPJ calculi no longer visualized. Resolved mild right hydronephrosis.    2. Unchanged nonobstructing 0.4 cm calculus. Unchanged punctate nonobstructing left renal calculi.    ***Please see the addendum at the top of this report. It may contain additional important information or changes.****    MARYA MARLEY MD; Attending Radiologist  This document has been electronically signed. Jun 30 2021  3:26PM  Addend:MARYA MARLEY MD; Attending Radiologist  This addendum was electronically signed on: Jun 30 2021  3:51PM.

## 2021-07-01 NOTE — PROGRESS NOTE ADULT - ASSESSMENT
IMPRESSION:  Sepsis present on admission  Septic shock resolved  Obstructive pyelonephritis s/p Right PCN adn right Percutaneous lithotripsy    Pseudomonas & E. faecalis VR bacteremia resolved   Chronic suprapubic catheter   Seizure yesterday? HO Seizures  HO Cerebral palsy chronically contracted    PLAN:    CNS:  Avoid CNS depressants.     HEENT:  Oral care.  Aspiration precautions.    PULMONARY:  HOB @ 45 degrees.  Aspiration precautions     CARDIOVASCULAR:  keep I=O     GI: GI prophylaxis. Feedings after procedure.     RENAL: FU lytes.  Correct as needed.  Monitor UO.  Monitor nephrostomy drain.  Repeat BW after electrolytes replacement     INFECTIOUS DISEASE:  ABX PER ID. ID fu for duration of abx    HEMATOLOGICAL:  DVT prophylaxis.     ENDOCRINE:  Follow up FS.  Insulin protocol if needed.    MUSCULOSKELETAL: bed rest    DISPOSITION: Floor

## 2021-07-02 LAB
ALBUMIN SERPL ELPH-MCNC: 2.9 G/DL — LOW (ref 3.5–5.2)
ALP SERPL-CCNC: 66 U/L — SIGNIFICANT CHANGE UP (ref 30–115)
ALT FLD-CCNC: 12 U/L — SIGNIFICANT CHANGE UP (ref 0–41)
ANION GAP SERPL CALC-SCNC: 7 MMOL/L — SIGNIFICANT CHANGE UP (ref 7–14)
AST SERPL-CCNC: 22 U/L — SIGNIFICANT CHANGE UP (ref 0–41)
BILIRUB SERPL-MCNC: 0.3 MG/DL — SIGNIFICANT CHANGE UP (ref 0.2–1.2)
BUN SERPL-MCNC: 9 MG/DL — LOW (ref 10–20)
CALCIUM SERPL-MCNC: 8.4 MG/DL — LOW (ref 8.5–10.1)
CHLORIDE SERPL-SCNC: 93 MMOL/L — LOW (ref 98–110)
CO2 SERPL-SCNC: 35 MMOL/L — HIGH (ref 17–32)
CREAT SERPL-MCNC: 0.5 MG/DL — LOW (ref 0.7–1.5)
GLUCOSE SERPL-MCNC: 179 MG/DL — HIGH (ref 70–99)
HCT VFR BLD CALC: 30.9 % — LOW (ref 42–52)
HGB BLD-MCNC: 10.6 G/DL — LOW (ref 14–18)
MAGNESIUM SERPL-MCNC: 1.3 MG/DL — LOW (ref 1.8–2.4)
MCHC RBC-ENTMCNC: 30.8 PG — SIGNIFICANT CHANGE UP (ref 27–31)
MCHC RBC-ENTMCNC: 34.3 G/DL — SIGNIFICANT CHANGE UP (ref 32–37)
MCV RBC AUTO: 89.8 FL — SIGNIFICANT CHANGE UP (ref 80–94)
NRBC # BLD: 0 /100 WBCS — SIGNIFICANT CHANGE UP (ref 0–0)
PLATELET # BLD AUTO: 277 K/UL — SIGNIFICANT CHANGE UP (ref 130–400)
POTASSIUM SERPL-MCNC: 3.5 MMOL/L — SIGNIFICANT CHANGE UP (ref 3.5–5)
POTASSIUM SERPL-SCNC: 3.5 MMOL/L — SIGNIFICANT CHANGE UP (ref 3.5–5)
PROT SERPL-MCNC: 5.7 G/DL — LOW (ref 6–8)
RBC # BLD: 3.44 M/UL — LOW (ref 4.7–6.1)
RBC # FLD: 13.2 % — SIGNIFICANT CHANGE UP (ref 11.5–14.5)
SODIUM SERPL-SCNC: 135 MMOL/L — SIGNIFICANT CHANGE UP (ref 135–146)
WBC # BLD: 5.7 K/UL — SIGNIFICANT CHANGE UP (ref 4.8–10.8)
WBC # FLD AUTO: 5.7 K/UL — SIGNIFICANT CHANGE UP (ref 4.8–10.8)

## 2021-07-02 PROCEDURE — 99233 SBSQ HOSP IP/OBS HIGH 50: CPT

## 2021-07-02 RX ORDER — MAGNESIUM SULFATE 500 MG/ML
2 VIAL (ML) INJECTION ONCE
Refills: 0 | Status: COMPLETED | OUTPATIENT
Start: 2021-07-02 | End: 2021-07-02

## 2021-07-02 RX ORDER — HEPARIN SODIUM 5000 [USP'U]/ML
5000 INJECTION INTRAVENOUS; SUBCUTANEOUS EVERY 12 HOURS
Refills: 0 | Status: DISCONTINUED | OUTPATIENT
Start: 2021-07-02 | End: 2021-07-06

## 2021-07-02 RX ADMIN — POLYMYXIN B SULFATE 500 UNIT(S): 500000 INJECTION, POWDER, LYOPHILIZED, FOR SOLUTION INTRAMUSCULAR; INTRATHECAL; INTRAVENOUS; OPHTHALMIC at 17:00

## 2021-07-02 RX ADMIN — Medication 50 GRAM(S): at 17:00

## 2021-07-02 RX ADMIN — CHLORHEXIDINE GLUCONATE 1 APPLICATION(S): 213 SOLUTION TOPICAL at 06:45

## 2021-07-02 RX ADMIN — POLYMYXIN B SULFATE 500 UNIT(S): 500000 INJECTION, POWDER, LYOPHILIZED, FOR SOLUTION INTRAMUSCULAR; INTRATHECAL; INTRAVENOUS; OPHTHALMIC at 08:16

## 2021-07-02 RX ADMIN — CHLORHEXIDINE GLUCONATE 1 APPLICATION(S): 213 SOLUTION TOPICAL at 22:20

## 2021-07-02 RX ADMIN — HEPARIN SODIUM 5000 UNIT(S): 5000 INJECTION INTRAVENOUS; SUBCUTANEOUS at 17:01

## 2021-07-02 RX ADMIN — Medication 12.5 GRAM(S): at 22:59

## 2021-07-02 RX ADMIN — Medication 64.8 MILLIGRAM(S): at 11:55

## 2021-07-02 RX ADMIN — MEROPENEM 100 MILLIGRAM(S): 1 INJECTION INTRAVENOUS at 22:28

## 2021-07-02 RX ADMIN — MEROPENEM 100 MILLIGRAM(S): 1 INJECTION INTRAVENOUS at 06:51

## 2021-07-02 RX ADMIN — MEROPENEM 100 MILLIGRAM(S): 1 INJECTION INTRAVENOUS at 13:18

## 2021-07-02 NOTE — CHART NOTE - NSCHARTNOTEFT_GEN_A_CORE
Registered Dietitian Follow-Up     Patient Profile Reviewed                           Yes [x]   No []     Nutrition History Previously Obtained        Yes [x]  No []      Pertinent Medical Interventions:  1. Septic shock 2/2 bacteremia from pyelonephritis + obstructive nephrolithiasis--resolved; s/p PCNL 6/29; nephrostogram 6/30   2. Obstructive urosepsis--resolved.   3. Clogged PEG--resolved s/p flush w. IR 6/28  4. Rash vs. excoriations vs. trauma from bandage removal on buttocks  5. Hypokalemia; hypomagnesemia--improved   6. h/o cerebral palsy/spastic paraplegia s/p PEG tube      Diet order: dysphagia 1 pureed, honey thick liquids. Pt received last PEG feed yesterday 7/1 at 2pm, unclear why feeds not resumed. Spoke to LIP Dr. Biswas who noted PEG unclogged & able to be in use. As per staff pt consumed 100% of breakfast today since PO diet initiated. At baseline, pt consumes PO pleasure feeds & PEG supplemental to ensure adequate nutrition/hydration achieved. Recommend offer PO meals first, if pt eats >50% then hold off on feed but if eats <50% provide PEG feed. Regimen noted at end of document & d/w Dr. Biswas.      Anthropometrics:  - Ht. 147.3cm  - Wt. 49.5kg (6/29) vs 57.4kg (6/25) vs. dosing wt. 57.4kg (6/18)--will continue to monitor wt trends closely   - %wt change  - BMI - 26.5  - IBW -     Pertinent Lab Data: (7/2/21) RBC 3.44, H/H 10.6/30.9, Cl 93, CO2 35, BUN 9, Cr 0.5, glucose 179, corrected Ca 9.28, Mg 1.3      Pertinent Meds: abx, albuterol     Physical Findings:  - Appearance: confused, disoriented   - GI function: fecal incontinence   - Tubes: PEG   - Oral/Mouth cavity: per SLP 7/1 recs: dysphagia 1 pureed, honey thick liquids w. 1:1 feeds   - Skin: stage II PU sacrum      Nutrition Requirements  Weight Used: dosing 57.4 kg; needs readjusted today in setting of increased needs to promote wound healing      Estimated Energy Needs: 0066-5578 kcal/day (30-35 kcal/kg CBW)  Estimated Protein Needs: 68-86 gm/day (1.2-1.5 gm/kg CBW)  Estimated Fluid Needs: per VENT team    Nutrient Intake: not meeting kcal/pro needs at this time      Previous Nutrition Diagnosis: Inadequate protein-energy intake (ongoing)     Nutrition Intervention: meals & snacks, enteral nutrition   Recommendation:  1. Order 3d Calorie count to monitor PO intake.   2. Continue PO feeds per SLP. Offer food first & if pt does not consume >50% of meals, provide PEG feed as noted below.  3. If intake <50% provide Jevity 1.2 @480mL q8H (3x/day a/w meal pattern). TF kev provides 1728kcal, 80g protein, 1166mL free H2O & 100% RDIs. Flushes per LIP or consider 100cc pre/post. Maintain aspiration precautions.      Goal/Expected Outcome: PO + TF to meet % est needs upon f/u in 4 days.      Indicator/Monitoring: RD to monitor energy intake, diet order, body composition, glucose/renal profile, NFPF.

## 2021-07-02 NOTE — PROGRESS NOTE ADULT - SUBJECTIVE AND OBJECTIVE BOX
TISH SHAIKH 65y Male  MRN#: [id number]   CODE STATUS:________full    Hospital Day: 15d    Pt is currently admitted with the primary diagnosis of Bacteremia/Septic Shock    SUBJECTIVE  Hospital Course  65y M with PMHx of cerebral palsy, Seizure disorder, spastic paraplegia s/p PEG, BPH, Hx of Nephrolithiasis s/p pcnl x 2 (last oen in 5/20/21) with known incompetent bladder neck s/p Suprapubic Tube Placement admitted on 6/17/21 with sepsis 2/2 obstructive nephrolithiasis. CT revealed R proximal ureteral stone with moderate hydroureteronephrosis. On 6/18, pt decompensated to uroseptic shock (T 103F, BP 75/45). Emergent R Percutaneous Nephrolithotomy performed by IR on 6/18/2021, Levophed started. BP improved following procedure and LR bolus. Levophed was discontinued on 6/20, and patient has been hemodynamically stable since. Pt was weaned off High Flow to nasal canula (6/22).     As of 6/24, pt was cleared by ID for any procedures.   On 6/25, IR was contacted for conversion of nephrostomy to nephro ureteral stent, as well assessment of PEG tube which is clogged.   On 6/28, pt underwent PCN to Nephroureteral Stent on 6/28, with PCNL on 6/29. On 6/28, IR will also assess PEG tube.     On 6/30, pt did nephrostogram w/ removal.     7/2: No acute issues at this time, resting comfortably. Patient had his RIGHT PCN removed by IR 2 days ago. RIGHT urostomy bag in place. Patient was downgraded to the floor on 7/1/21 in stable condition. Planning to continue IV Abx as of 6/24/2021. Will advance diet and attempt to wean of PEG feeding.    Overnight events     Subjective complaints     Present Today:   - Edouard:  No [  ], Yes [   ] : Indication:     - Type of IV Access:       .. CVC/Piccline:  No [  ], Yes [   ] : Indication:       .. Midline: No [  ], Yes [   ] : Indication:                                              OBJECTIVE  PAST MEDICAL & SURGICAL HISTORY  BPH (benign prostatic hyperplasia)    Cerebral palsy    Seizure  last seizure &gt;10 years ago    Osteoporosis    Spastic quadriplegia    Urinary calculi    Urinary retention    Asthma    S/P percutaneous endoscopic gastrostomy (PEG) tube placement    H/O cystoscopy    Suprapubic catheter                                                ALLERGIES:  No Known Allergies                           HOME MEDICATIONS  Home Medications:  Albuterol (Eqv-ProAir HFA) 90 mcg/inh inhalation aerosol: 2 puff(s) inhaled every 6 hours (18 Jun 2021 01:48)  albuterol 2.5 mg/3 mL (0.083%) inhalation solution: 3 milliliter(s) inhaled every 6 hours (18 Jun 2021 01:48)  lactulose 10 g/15 mL oral syrup: 15 milliliter(s) orally once a day (18 Jun 2021 01:48)  Milk of Magnesia 8% oral suspension: 15 milliliter(s) orally once a day (at bedtime) (18 Jun 2021 01:48)  PHENobarbital 64.8 mg oral tablet: 1 tab(s) orally once a day via G tube (18 Jun 2021 01:48)                           MEDICATIONS:  STANDING MEDICATIONS  chlorhexidine 4% Liquid 1 Application(s) Topical <User Schedule>  heparin   Injectable 5000 Unit(s) SubCutaneous every 12 hours  magnesium sulfate  IVPB 2 Gram(s) IV Intermittent once  meropenem  IVPB 1000 milliGRAM(s) IV Intermittent every 8 hours  PHENobarbital 64.8 milliGRAM(s) Oral daily  polymyxin B IVPB [id number] Unit(s) IV Intermittent every 12 hours  tamsulosin 0.4 milliGRAM(s) Oral at bedtime    PRN MEDICATIONS  acetaminophen  Suppository .. 650 milliGRAM(s) Rectal every 6 hours PRN  ALBUTerol    90 MICROgram(s) HFA Inhaler 2 Puff(s) Inhalation every 6 hours PRN                                            ------------------------------------------------------------  VITAL SIGNS: Last 24 Hours  T(C): 37.2 (02 Jul 2021 14:48), Max: 37.2 (01 Jul 2021 21:59)  T(F): 99 (02 Jul 2021 14:48), Max: 99 (02 Jul 2021 14:48)  HR: 101 (02 Jul 2021 14:48) (91 - 101)  BP: 98/53 (02 Jul 2021 14:48) (98/53 - 107/53)  BP(mean): --  RR: 19 (02 Jul 2021 14:48) (19 - 19)  SpO2: --      07-01-21 @ 07:01  -  07-02-21 @ 07:00  --------------------------------------------------------  IN: 950 mL / OUT: 1200 mL / NET: -250 mL                                               LABS:                        10.6   5.70  )-----------( 277      ( 02 Jul 2021 04:30 )             30.9     07-02    135  |  93<L>  |  9<L>  ----------------------------<  179<H>  3.5   |  35<H>  |  0.5<L>    Ca    8.4<L>      02 Jul 2021 04:30  Mg     1.3     07-02    TPro  5.7<L>  /  Alb  2.9<L>  /  TBili  0.3  /  DBili  x   /  AST  22  /  ALT  12  /  AlkPhos  66  07-02                                              RADIOLOGY:    < from: CT Abdomen and Pelvis No Cont (06.30.21 @ 15:15) >  1. Right internal-external nephroureteral catheter, with previously seen 2 right renalpelvic/UPJ calculi no longer visualized. Resolved mild right hydronephrosis.    2. Unchanged nonobstructing 0.4 cm calculus. Unchanged punctate nonobstructing left renal calculi.    < end of copied text >    < from: Xray Chest 1 View- PORTABLE-Urgent (Xray Chest 1 View- PORTABLE-Urgent .) (06.29.21 @ 19:07) >  Elevation left hemidiaphragm with adjacent atelectasis. Support devices as described.    < end of copied text >      PHYSICAL EXAM:  GENERAL: NAD, lying in bed comfortably, spastic upper extremity flexed  HEAD:  Atraumatic, Normocephalic  EYES:conjunctiva and sclera clear  ENT: Moist mucous membranes, dry mouth  NECK: No JVD  CHEST/LUNG: Inspiratory expiratory crackles  HEART: Regular rate and rhythm; No murmurs, rubs, or gallops  ABDOMEN: BSx4; Soft, nontender, nondistended  EXTREMITIES:  1+ edema  NERVOUS SYSTEM:  A&Ox3, no focal deficits   SKIN: 1+ pressure ulcer on left hip

## 2021-07-02 NOTE — PROGRESS NOTE ADULT - ASSESSMENT
# Septic shock 2/2 bacteremia from pyelonephritis + obstructive nephrolithiasis    # obstructive uropathy - resolved  - s/p R PCN on 6/18   - Nephrostomy tube to nephroureteral stent by IR on 6/28   - PCNL procedure by Uro completed 6/29  - Nephrostogram finished on 6/30 successfully  - Blood Cultures 6/18 (+) for carbap. resistant Pseudomonas and E. fecalis; Blood Culx 6/25: NGTD  - Urine Culx 6/18 (+) pseudomonas, proteus, ESBL, s aureus, actinotignum   Plan:   - as per ID reccs: c/w Meropenem and Polymyxin; till 7/5    #Gross Hematuria - resolved  # Bleeding from Nephrostomy tube - resolved  - CT 6/24: appropriately positioned nephrostomy tube with decreased now mild hydroureteronephrosis  - Hb Stable     #acute hypoxic respiratory failure possibly secondary to acute seizure - resolved  - CXR 6/29: Retrated L IF central cath; No evidence of cardiopulmonary disease  - TTE: EF 62%, mild aortic stenosis  - stable room air 7/1    #Hypokalemia   #Hypomagnesemia   - likely 2/2 Polymyxin   - replete     #Seizure disorder  #Hx of cerebral palsy/spastic paraplegia s/p PEG tube   - c/w PO Phenobarbital    #BPH: c/w tamsulosin   #Asthma:  c/w albuterol 90 microgram Q6h PRN        DVT ppx: heparin      #Progress Note Handoff  Pending (specify):  completion of abx, SNF placement   Family discussion:   Disposition: [facility]

## 2021-07-02 NOTE — PROGRESS NOTE ADULT - ASSESSMENT
64y/o M s/p RIGHT PCNL POD # 3    - Case d/w Dr. Cooper, seen at bedside with Dr. Timmons   - Removed urostomy bag from right flank.  - No further inpatient urologic intervention at this time   - F/u with Dr. Cooper as outpatient for further management.

## 2021-07-02 NOTE — PROGRESS NOTE ADULT - SUBJECTIVE AND OBJECTIVE BOX
UROLOGY: Pt is a 64y/o M s/p RIGHT PCNL POD # 3. Pt seen and examined at bedside with Dr. Timmons. No acute issues at this time.     [ x ] Due to altered mental status/intubation, subjective information was not able to be obtained from the patient. History was obtained to the extent possible from review of the chart and collateral sources of information.     Vital signs  T(C): , Max: 37.2 (07-01-21 @ 21:59)  HR: 101 (07-02-21 @ 14:48)  BP: 98/53 (07-02-21 @ 14:48)    Constitutional: NAD, well-developed  HEENT: EOMI  Neck: no pain  Back: No CVA tenderness  Respiratory: No accessory respiratory muscle use  Abd: Soft, nontender   :  +SPT draining clear yellow urine, bladder nonpalpable  Extremities: no edema  Neurological: A/O x 3  Psychiatric: Normal mood, normal affect  Skin: No rashes    Labs             10.6   5.70  )-----------( 277      ( 02 Jul 2021 04:30 )             30.9     135    |  93     |  9      ----------------------------<  179    3.5     |  35     |  0.5      Ca    8.4        02 Jul 2021 04:30  Mg     1.3       02 Jul 2021 04:30

## 2021-07-02 NOTE — PROGRESS NOTE ADULT - ASSESSMENT
ASSESSMENT & PLAN      Cerebral palsy    Seizure  last seizure &gt;10 years ago    Osteoporosis    Spastic quadriplegia    Urinary calculi  - s/p Right PCNL 6/29/2021  - IV Meropenem/Polymyxin B 6/24/2021 for 2 weeks  - Urostomy bag removed  - F/u with Dr. Cooper as outpatient for further management.   - c/w tamsulosin    Hematuria  - Bleeding from nephrostomy tube (resolved)    Urinary retention  - suprapubic catheter    Asthma  - albuterol PRN    Hypoxic respiratory failure  - Possibly due to seizure  - Took IV phenobarbital then back to PO    Hypokalemia/Hypomagnesemia  - Monitor K/Mg  - Likely due to Polymyxin B    S/P percutaneous endoscopic gastrostomy (PEG) tube placement  - resume oral diet, supplement as needed through PEG                                                                              ----------------------------------------------------  # DVT prophylaxis heparin bid    # GI prophylaxis     # Diet dysphagia` 1 pureed honey, calory count    # Activity Score (AM-PAC)    # Code status full    # Disposition                                                                              --------------------------------------------------------    # Handoff

## 2021-07-02 NOTE — PROGRESS NOTE ADULT - TIME BILLING
I have seen and evaluated the patient  I agree with the content of the progress note
See above plan
as per PA note    65 year old  male s/p right PCN placement by IR on 6/18 with sepsis & hypotension - awaiting PCNL when medically optimal. in NAD today. T max 100.4 noted.     Plan:   As per discussion with IR resident (spectra [phone]) Pt. will be going for Right PCNU next Monday. NPO after midnight on Sunday. Pre-op labs. Hold AC.   Please provide medical/cardio clearances.   ID clearance appreciated   Please obtain COVID test on Sunday 6/27/21 evening.   Tentatively OR Right PCNL procedure on 7/29     I agree with the assessment and plan as outlined
as per PA noted    64y/o M s/p RIGHT PCNL POD # 3    - Case was d/w Dr. Cooper,   - Removed urostomy bag from right flank.  - No further inpatient urologic intervention at this time   - F/u with Dr. Cooper as outpatient for further management.     I agree with the assessment and plan as outlined

## 2021-07-02 NOTE — PROGRESS NOTE ADULT - SUBJECTIVE AND OBJECTIVE BOX
Pt seen and examined at bedside.     VITAL SIGNS (Last 24 hrs):  T(C): 37.2 (07-02-21 @ 14:48), Max: 37.2 (07-01-21 @ 21:59)  HR: 101 (07-02-21 @ 14:48) (91 - 101)  BP: 98/53 (07-02-21 @ 14:48) (98/53 - 107/53)  RR: 19 (07-02-21 @ 14:48) (19 - 19)  SpO2: --  Wt(kg): --  Daily     Daily     I&O's Summary    01 Jul 2021 07:01  -  02 Jul 2021 07:00  --------------------------------------------------------  IN: 950 mL / OUT: 1200 mL / NET: -250 mL        PHYSICAL EXAM:  GENERAL: NAD   HEAD:  Atraumatic, Normocephalic  EYES:   conjunctiva and sclera clear  NECK: Supple, No JVD  CHEST/LUNG: Clear to auscultation bilaterally; No wheeze  HEART: Regular rate and rhythm; No murmurs, rubs, or gallops  ABDOMEN: Soft, Nontender, Nondistended; Bowel sounds present +PEG   EXTREMITIES:  2+ Peripheral Pulses, No clubbing, cyanosis, or edema  SKIN: No rashes or lesions         CBC Full  -  ( 02 Jul 2021 04:30 )  WBC Count : 5.70 K/uL  Hemoglobin : 10.6 g/dL  Hematocrit : 30.9 %  Platelet Count - Automated : 277 K/uL  Mean Cell Volume : 89.8 fL  Mean Cell Hemoglobin : 30.8 pg  Mean Cell Hemoglobin Concentration : 34.3 g/dL  Auto Neutrophil # : x  Auto Lymphocyte # : x  Auto Monocyte # : x  Auto Eosinophil # : x  Auto Basophil # : x  Auto Neutrophil % : x  Auto Lymphocyte % : x  Auto Monocyte % : x  Auto Eosinophil % : x  Auto Basophil % : x    BMP:    07-02 @ 04:30    Blood Urea Nitrogen - 9  Calcium - 8.4  Carbond Dioxide - 35  Chloride - 93  Creatinine - 0.5  Glucose - 179  Potassium - 3.5  Sodium - 135      Hemoglobin A1c -   PT/INR - ( 29 Jun 2021 03:00 )   PT: 14.70 sec;   INR: 1.28 ratio         PTT - ( 29 Jun 2021 03:00 )  PTT:27.4 sec  Urine Culture:  06-29 @ 11:39 Urine culture: --    Culture Results:   No growth  Method Type: --  Organism: --  Organism Identification: --  Specimen Source: .Surgical Swab None       MEDICATIONS  (STANDING):  chlorhexidine 4% Liquid 1 Application(s) Topical <User Schedule>  heparin   Injectable 5000 Unit(s) SubCutaneous every 12 hours  magnesium sulfate  IVPB 2 Gram(s) IV Intermittent once  magnesium sulfate  IVPB 2 Gram(s) IV Intermittent once  meropenem  IVPB 1000 milliGRAM(s) IV Intermittent every 8 hours  PHENobarbital 64.8 milliGRAM(s) Oral daily  polymyxin B IVPB [id number] Unit(s) IV Intermittent every 12 hours  tamsulosin 0.4 milliGRAM(s) Oral at bedtime    MEDICATIONS  (PRN):  acetaminophen  Suppository .. 650 milliGRAM(s) Rectal every 6 hours PRN Temp greater or equal to 38C (100.4F)  ALBUTerol    90 MICROgram(s) HFA Inhaler 2 Puff(s) Inhalation every 6 hours PRN Bronchospasm

## 2021-07-02 NOTE — CHART NOTE - NSCHARTNOTESELECT_GEN_ALL_CORE
Event Note
Rapid Response
SDU upgrade/Transfer Note
Transfer Note
Event Note
RD follow up/Event Note
Transfer Note

## 2021-07-03 LAB
ANION GAP SERPL CALC-SCNC: 8 MMOL/L — SIGNIFICANT CHANGE UP (ref 7–14)
BASOPHILS # BLD AUTO: 0.03 K/UL — SIGNIFICANT CHANGE UP (ref 0–0.2)
BASOPHILS NFR BLD AUTO: 0.4 % — SIGNIFICANT CHANGE UP (ref 0–1)
BUN SERPL-MCNC: 10 MG/DL — SIGNIFICANT CHANGE UP (ref 10–20)
CALCIUM SERPL-MCNC: 8.5 MG/DL — SIGNIFICANT CHANGE UP (ref 8.5–10.1)
CHLORIDE SERPL-SCNC: 93 MMOL/L — LOW (ref 98–110)
CO2 SERPL-SCNC: 33 MMOL/L — HIGH (ref 17–32)
CREAT SERPL-MCNC: 0.6 MG/DL — LOW (ref 0.7–1.5)
EOSINOPHIL # BLD AUTO: 0.32 K/UL — SIGNIFICANT CHANGE UP (ref 0–0.7)
EOSINOPHIL NFR BLD AUTO: 4.7 % — SIGNIFICANT CHANGE UP (ref 0–8)
GLUCOSE SERPL-MCNC: 93 MG/DL — SIGNIFICANT CHANGE UP (ref 70–99)
HCT VFR BLD CALC: 31.6 % — LOW (ref 42–52)
HGB BLD-MCNC: 11 G/DL — LOW (ref 14–18)
IMM GRANULOCYTES NFR BLD AUTO: 0.7 % — HIGH (ref 0.1–0.3)
LYMPHOCYTES # BLD AUTO: 1.34 K/UL — SIGNIFICANT CHANGE UP (ref 1.2–3.4)
LYMPHOCYTES # BLD AUTO: 19.8 % — LOW (ref 20.5–51.1)
MAGNESIUM SERPL-MCNC: 1.8 MG/DL — SIGNIFICANT CHANGE UP (ref 1.8–2.4)
MCHC RBC-ENTMCNC: 31.1 PG — HIGH (ref 27–31)
MCHC RBC-ENTMCNC: 34.8 G/DL — SIGNIFICANT CHANGE UP (ref 32–37)
MCV RBC AUTO: 89.3 FL — SIGNIFICANT CHANGE UP (ref 80–94)
MONOCYTES # BLD AUTO: 0.83 K/UL — HIGH (ref 0.1–0.6)
MONOCYTES NFR BLD AUTO: 12.3 % — HIGH (ref 1.7–9.3)
NEUTROPHILS # BLD AUTO: 4.19 K/UL — SIGNIFICANT CHANGE UP (ref 1.4–6.5)
NEUTROPHILS NFR BLD AUTO: 62.1 % — SIGNIFICANT CHANGE UP (ref 42.2–75.2)
NRBC # BLD: 0 /100 WBCS — SIGNIFICANT CHANGE UP (ref 0–0)
PLATELET # BLD AUTO: 302 K/UL — SIGNIFICANT CHANGE UP (ref 130–400)
POTASSIUM SERPL-MCNC: 3.2 MMOL/L — LOW (ref 3.5–5)
POTASSIUM SERPL-SCNC: 3.2 MMOL/L — LOW (ref 3.5–5)
RBC # BLD: 3.54 M/UL — LOW (ref 4.7–6.1)
RBC # FLD: 13.2 % — SIGNIFICANT CHANGE UP (ref 11.5–14.5)
SODIUM SERPL-SCNC: 134 MMOL/L — LOW (ref 135–146)
WBC # BLD: 6.76 K/UL — SIGNIFICANT CHANGE UP (ref 4.8–10.8)
WBC # FLD AUTO: 6.76 K/UL — SIGNIFICANT CHANGE UP (ref 4.8–10.8)

## 2021-07-03 PROCEDURE — 99232 SBSQ HOSP IP/OBS MODERATE 35: CPT

## 2021-07-03 RX ORDER — NYSTATIN 500MM UNIT
500000 POWDER (EA) MISCELLANEOUS THREE TIMES A DAY
Refills: 0 | Status: DISCONTINUED | OUTPATIENT
Start: 2021-07-03 | End: 2021-07-06

## 2021-07-03 RX ORDER — POTASSIUM CHLORIDE 20 MEQ
40 PACKET (EA) ORAL EVERY 4 HOURS
Refills: 0 | Status: COMPLETED | OUTPATIENT
Start: 2021-07-03 | End: 2021-07-03

## 2021-07-03 RX ORDER — SODIUM CHLORIDE 9 MG/ML
500 INJECTION, SOLUTION INTRAVENOUS
Refills: 0 | Status: DISCONTINUED | OUTPATIENT
Start: 2021-07-03 | End: 2021-07-04

## 2021-07-03 RX ADMIN — MEROPENEM 100 MILLIGRAM(S): 1 INJECTION INTRAVENOUS at 05:20

## 2021-07-03 RX ADMIN — SODIUM CHLORIDE 50 MILLILITER(S): 9 INJECTION, SOLUTION INTRAVENOUS at 23:34

## 2021-07-03 RX ADMIN — HEPARIN SODIUM 5000 UNIT(S): 5000 INJECTION INTRAVENOUS; SUBCUTANEOUS at 05:21

## 2021-07-03 RX ADMIN — Medication 500000 UNIT(S): at 13:11

## 2021-07-03 RX ADMIN — HEPARIN SODIUM 5000 UNIT(S): 5000 INJECTION INTRAVENOUS; SUBCUTANEOUS at 17:15

## 2021-07-03 RX ADMIN — Medication 40 MILLIEQUIVALENT(S): at 17:15

## 2021-07-03 RX ADMIN — Medication 64.8 MILLIGRAM(S): at 12:27

## 2021-07-03 RX ADMIN — POLYMYXIN B SULFATE 500 UNIT(S): 500000 INJECTION, POWDER, LYOPHILIZED, FOR SOLUTION INTRAMUSCULAR; INTRATHECAL; INTRAVENOUS; OPHTHALMIC at 05:56

## 2021-07-03 RX ADMIN — Medication 40 MILLIEQUIVALENT(S): at 13:11

## 2021-07-03 RX ADMIN — MEROPENEM 100 MILLIGRAM(S): 1 INJECTION INTRAVENOUS at 21:32

## 2021-07-03 RX ADMIN — TAMSULOSIN HYDROCHLORIDE 0.4 MILLIGRAM(S): 0.4 CAPSULE ORAL at 21:32

## 2021-07-03 RX ADMIN — MEROPENEM 100 MILLIGRAM(S): 1 INJECTION INTRAVENOUS at 13:11

## 2021-07-03 RX ADMIN — POLYMYXIN B SULFATE 500 UNIT(S): 500000 INJECTION, POWDER, LYOPHILIZED, FOR SOLUTION INTRAMUSCULAR; INTRATHECAL; INTRAVENOUS; OPHTHALMIC at 17:15

## 2021-07-03 RX ADMIN — Medication 500000 UNIT(S): at 21:32

## 2021-07-03 NOTE — PROGRESS NOTE ADULT - ASSESSMENT
# Septic shock 2/2 bacteremia from pyelonephritis + obstructive nephrolithiasis    # Obstructive uropathy - resolved  - s/p R PCN on 6/18   - Nephrostomy tube to nephroureteral stent by IR on 6/28   - PCNL procedure by Uro completed 6/29  - Nephrostogram finished on 6/30 successfully  - Blood Cultures 6/18 (+) for carbap. resistant Pseudomonas and E. fecalis; Blood Culx 6/25: NGTD  - Urine Culx 6/18 (+) pseudomonas, proteus, ESBL, s aureus, actinotignum   Plan:   - as per ID reccs: c/w Meropenem and Polymyxin; till 7/5    #Gross Hematuria - resolved  # Bleeding from Nephrostomy tube - resolved  - CT 6/24: appropriately positioned nephrostomy tube with decreased now mild hydroureteronephrosis  - Hb Stable     #acute hypoxic respiratory failure possibly secondary to acute seizure - resolved  - CXR 6/29: Retrated L IF central cath; No evidence of cardiopulmonary disease  - TTE: EF 62%, mild aortic stenosis  - stable room air 7/1    #Hypokalemia   #Hypomagnesemia   - likely 2/2 Polymyxin   - replete     #Seizure disorder  #Hx of cerebral palsy/spastic paraplegia s/p PEG tube   - c/w PO Phenobarbital    #BPH: c/w tamsulosin   #Asthma:  c/w albuterol 90 microgram Q6h PRN        DVT ppx: heparin      #Progress Note Handoff  Pending (specify):  completion of abx, SNF placement   Family discussion:   Disposition: SNF

## 2021-07-03 NOTE — PROGRESS NOTE ADULT - ASSESSMENT
65 year old male with CP currently admitted for Abx therapy of bacteremia secondary to pyelonephritis secondary to nephrolithiasis. s/p PCNL (6/29/2021). Afebrile, hemodynamically stable    Cerebral palsy    Osteoporosis    Spastic quadriplegia    Urinary calculi  - s/p Right PCNL 6/29/2021  - IV Meropenem/Polymyxin B 6/24/2021 for 2 weeks  - Urostomy bag removed  - F/u with Dr. Cooper as outpatient for further management.   - c/w tamsulosin    Hematuria  - Bleeding from nephrostomy tube (resolved)    Urinary retention  - suprapubic catheter    Asthma  - albuterol PRN    Hypoxic respiratory failure  - Possibly due to seizure  - Took IV phenobarbital then back to PO    Hypokalemia/Hypomagnesemia  - Monitor K/Mg  - Likely due to Polymyxin B    S/P percutaneous endoscopic gastrostomy (PEG) tube placement  - resume oral diet, supplement as needed through PEG                                                                              ----------------------------------------------------  # DVT prophylaxis heparin bid    # GI prophylaxis     # Diet dysphagia` 1 pureed honey, calory count    # Activity Score (AM-PAC)    # Code status full    # Disposition                                                                              --------------------------------------------------------    # Handoff

## 2021-07-03 NOTE — PROGRESS NOTE ADULT - SUBJECTIVE AND OBJECTIVE BOX
Pt seen and examined at bedside. Eating all his meals.     VITAL SIGNS (Last 24 hrs):  T(C): 37.1 (07-03-21 @ 04:51), Max: 37.2 (07-02-21 @ 14:48)  HR: 80 (07-03-21 @ 04:51) (80 - 101)  BP: 96/58 (07-03-21 @ 04:51) (96/58 - 98/53)  RR: 18 (07-03-21 @ 04:51) (18 - 19)  SpO2: 94% (07-03-21 @ 12:54) (93% - 94%)  Wt(kg): --  Daily     Daily     I&O's Summary    02 Jul 2021 07:01  -  03 Jul 2021 07:00  --------------------------------------------------------  IN: 650 mL / OUT: 1200 mL / NET: -550 mL        PHYSICAL EXAM:  GENERAL: NAD   HEAD:  Atraumatic, Normocephalic  EYES:  conjunctiva and sclera clear  NECK: Supple, No JVD  CHEST/LUNG: Clear to auscultation bilaterally; No wheeze  HEART: Regular rate and rhythm; No murmurs, rubs, or gallops  ABDOMEN: Soft, Nontender, Nondistended; Bowel sounds present    EXTREMITIES:  2+ Peripheral Pulses, No clubbing, cyanosis, or edema  SKIN: No rashes or lesions    Labs Reviewed       CBC Full  -  ( 03 Jul 2021 08:08 )  WBC Count : 6.76 K/uL  Hemoglobin : 11.0 g/dL  Hematocrit : 31.6 %  Platelet Count - Automated : 302 K/uL  Mean Cell Volume : 89.3 fL  Mean Cell Hemoglobin : 31.1 pg  Mean Cell Hemoglobin Concentration : 34.8 g/dL  Auto Neutrophil # : 4.19 K/uL  Auto Lymphocyte # : 1.34 K/uL  Auto Monocyte # : 0.83 K/uL  Auto Eosinophil # : 0.32 K/uL  Auto Basophil # : 0.03 K/uL  Auto Neutrophil % : 62.1 %  Auto Lymphocyte % : 19.8 %  Auto Monocyte % : 12.3 %  Auto Eosinophil % : 4.7 %  Auto Basophil % : 0.4 %    BMP:    07-03 @ 08:08    Blood Urea Nitrogen - 10  Calcium - 8.5  Carbond Dioxide - 33  Chloride - 93  Creatinine - 0.6  Glucose - 93  Potassium - 3.2  Sodium - 134         Urine Culture:  06-29 @ 11:39 Urine culture: --    Culture Results:   No growth  Method Type: --  Organism: --  Organism Identification: --  Specimen Source: .Surgical Swab None         MEDICATIONS  (STANDING):  chlorhexidine 4% Liquid 1 Application(s) Topical <User Schedule>  heparin   Injectable 5000 Unit(s) SubCutaneous every 12 hours  meropenem  IVPB 1000 milliGRAM(s) IV Intermittent every 8 hours  nystatin    Suspension 538312 Unit(s) Swish and Swallow three times a day  PHENobarbital 64.8 milliGRAM(s) Oral daily  polymyxin B IVPB 792409 Unit(s) IV Intermittent every 12 hours  potassium chloride   Powder 40 milliEquivalent(s) Oral every 4 hours  tamsulosin 0.4 milliGRAM(s) Oral at bedtime    MEDICATIONS  (PRN):  acetaminophen  Suppository .. 650 milliGRAM(s) Rectal every 6 hours PRN Temp greater or equal to 38C (100.4F)  ALBUTerol    90 MICROgram(s) HFA Inhaler 2 Puff(s) Inhalation every 6 hours PRN Bronchospasm

## 2021-07-03 NOTE — PROGRESS NOTE ADULT - SUBJECTIVE AND OBJECTIVE BOX
`TISH SHAIKH 65y Male  MRN#: [id number]   CODE STATUS:________    Hospital Day: 16d    Pt is currently admitted with the primary diagnosis of     SUBJECTIVE  Hospital Course  65y M with PMHx of cerebral palsy, Seizure disorder, spastic paraplegia s/p PEG, BPH, Hx of Nephrolithiasis s/p pcnl x 2 (last oen in 5/20/21) with known incompetent bladder neck s/p Suprapubic Tube Placement admitted on 6/17/21 with sepsis 2/2 obstructive nephrolithiasis. CT revealed R proximal ureteral stone with moderate hydroureteronephrosis. On 6/18, pt decompensated to uroseptic shock (T 103F, BP 75/45). Emergent R Percutaneous Nephrolithotomy performed by IR on 6/18/2021, Levophed started. BP improved following procedure and LR bolus. Levophed was discontinued on 6/20, and patient has been hemodynamically stable since. Pt was weaned off High Flow to nasal canula (6/22).     As of 6/24, pt was cleared by ID for any procedures.   On 6/25, IR was contacted for conversion of nephrostomy to nephro ureteral stent, as well assessment of PEG tube which is clogged.   On 6/28, pt underwent PCN to Nephroureteral Stent on 6/28, with PCNL on 6/29. On 6/28, IR will also assess PEG tube.     On 6/30, pt did nephrostogram w/ removal.     7/2: No acute issues at this time, resting comfortably. Patient had his RIGHT PCN removed by IR 2 days ago. RIGHT urostomy bag in place. Patient was downgraded to the floor on 7/1/21 in stable condition. Planning to continue IV Abx as of 6/24/2021. Will advance diet and attempt to wean of PEG feeding.  7/3: Doing well. no complaints. Good calorie intake    Overnight events     Subjective complaints     Present Today:   - Edouard:  No [  ], Yes [   ] : Indication: suprapubic catheter    - Type of IV Access:       .. CVC/Piccline:  No [  ], Yes [   ] : Indication:       .. Midline: No [  ], Yes [   ] : Indication:                                              OBJECTIVE  PAST MEDICAL & SURGICAL HISTORY  BPH (benign prostatic hyperplasia)    Cerebral palsy    Seizure  last seizure &gt;10 years ago    Osteoporosis    Spastic quadriplegia    Urinary calculi    Urinary retention    Asthma    S/P percutaneous endoscopic gastrostomy (PEG) tube placement    H/O cystoscopy    Suprapubic catheter                                                ALLERGIES:  No Known Allergies                           HOME MEDICATIONS  Home Medications:  Albuterol (Eqv-ProAir HFA) 90 mcg/inh inhalation aerosol: 2 puff(s) inhaled every 6 hours (18 Jun 2021 01:48)  albuterol 2.5 mg/3 mL (0.083%) inhalation solution: 3 milliliter(s) inhaled every 6 hours (18 Jun 2021 01:48)  lactulose 10 g/15 mL oral syrup: 15 milliliter(s) orally once a day (18 Jun 2021 01:48)  Milk of Magnesia 8% oral suspension: 15 milliliter(s) orally once a day (at bedtime) (18 Jun 2021 01:48)  PHENobarbital 64.8 mg oral tablet: 1 tab(s) orally once a day via G tube (18 Jun 2021 01:48)                           MEDICATIONS:  STANDING MEDICATIONS  chlorhexidine 4% Liquid 1 Application(s) Topical <User Schedule>  heparin   Injectable 5000 Unit(s) SubCutaneous every 12 hours  meropenem  IVPB 1000 milliGRAM(s) IV Intermittent every 8 hours  nystatin    Suspension 801412 Unit(s) Swish and Swallow three times a day  PHENobarbital 64.8 milliGRAM(s) Oral daily  polymyxin B IVPB 535870 Unit(s) IV Intermittent every 12 hours  potassium chloride   Powder 40 milliEquivalent(s) Oral every 4 hours  tamsulosin 0.4 milliGRAM(s) Oral at bedtime    PRN MEDICATIONS  acetaminophen  Suppository .. 650 milliGRAM(s) Rectal every 6 hours PRN  ALBUTerol    90 MICROgram(s) HFA Inhaler 2 Puff(s) Inhalation every 6 hours PRN                                            ------------------------------------------------------------  VITAL SIGNS: Last 24 Hours  T(C): 37.1 (03 Jul 2021 04:51), Max: 37.1 (03 Jul 2021 04:51)  T(F): 98.7 (03 Jul 2021 04:51), Max: 98.7 (03 Jul 2021 04:51)  HR: 80 (03 Jul 2021 04:51) (80 - 80)  BP: 96/58 (03 Jul 2021 04:51) (96/58 - 96/58)  BP(mean): --  RR: 18 (03 Jul 2021 04:51) (18 - 18)  SpO2: 94% (03 Jul 2021 12:54) (93% - 94%)      07-02-21 @ 07:01  -  07-03-21 @ 07:00  --------------------------------------------------------  IN: 650 mL / OUT: 1200 mL / NET: -550 mL                                               LABS:                        11.0   6.76  )-----------( 302      ( 03 Jul 2021 08:08 )             31.6     07-03    134<L>  |  93<L>  |  10  ----------------------------<  93  3.2<L>   |  33<H>  |  0.6<L>    Ca    8.5      03 Jul 2021 08:08  Mg     1.8     07-03    TPro  5.7<L>  /  Alb  2.9<L>  /  TBili  0.3  /  DBili  x   /  AST  22  /  ALT  12  /  AlkPhos  66  07-02                                                              RADIOLOGY:    < from: CT Abdomen and Pelvis No Cont (06.30.21 @ 15:15) >  1. Right internal-external nephroureteral catheter, with previously seen 2 right renalpelvic/UPJ calculi no longer visualized. Resolved mild right hydronephrosis.    2. Unchanged nonobstructing 0.4 cm calculus. Unchanged punctate nonobstructing left renal calculi.    < end of copied text >    < from: Xray Chest 1 View- PORTABLE-Urgent (Xray Chest 1 View- PORTABLE-Urgent .) (06.29.21 @ 19:07) >  Elevation left hemidiaphragm with adjacent atelectasis. Support devices as described.    < end of copied text >      PHYSICAL EXAM:  GENERAL: NAD, lying in bed comfortably, spastic upper extremity flexed  HEAD:  Atraumatic, Normocephalic  EYES: conjunctiva and sclera clear  ENT: Moist mucous membranes, dry mouth  NECK: No JVD  CHEST/LUNG: decreased Inspiratory and expiratory crackles  HEART: Regular rate and rhythm; No murmurs, rubs, or gallops  ABDOMEN: BSx4; Soft, nontender, nondistended  EXTREMITIES:  1+ edema  NERVOUS SYSTEM:  A&Ox3, no focal deficits   SKIN: 1+ pressure ulcer on left hip

## 2021-07-04 LAB
ANION GAP SERPL CALC-SCNC: 8 MMOL/L — SIGNIFICANT CHANGE UP (ref 7–14)
BASOPHILS # BLD AUTO: 0.03 K/UL — SIGNIFICANT CHANGE UP (ref 0–0.2)
BASOPHILS NFR BLD AUTO: 0.3 % — SIGNIFICANT CHANGE UP (ref 0–1)
BUN SERPL-MCNC: 11 MG/DL — SIGNIFICANT CHANGE UP (ref 10–20)
CALCIUM SERPL-MCNC: 8.4 MG/DL — LOW (ref 8.5–10.1)
CHLORIDE SERPL-SCNC: 93 MMOL/L — LOW (ref 98–110)
CO2 SERPL-SCNC: 33 MMOL/L — HIGH (ref 17–32)
CREAT SERPL-MCNC: 0.7 MG/DL — SIGNIFICANT CHANGE UP (ref 0.7–1.5)
EOSINOPHIL # BLD AUTO: 0.31 K/UL — SIGNIFICANT CHANGE UP (ref 0–0.7)
EOSINOPHIL NFR BLD AUTO: 3.4 % — SIGNIFICANT CHANGE UP (ref 0–8)
GLUCOSE SERPL-MCNC: 78 MG/DL — SIGNIFICANT CHANGE UP (ref 70–99)
HCT VFR BLD CALC: 31.3 % — LOW (ref 42–52)
HGB BLD-MCNC: 10.5 G/DL — LOW (ref 14–18)
IMM GRANULOCYTES NFR BLD AUTO: 0.4 % — HIGH (ref 0.1–0.3)
LYMPHOCYTES # BLD AUTO: 1.57 K/UL — SIGNIFICANT CHANGE UP (ref 1.2–3.4)
LYMPHOCYTES # BLD AUTO: 17.3 % — LOW (ref 20.5–51.1)
MAGNESIUM SERPL-MCNC: 1 MG/DL — LOW (ref 1.8–2.4)
MCHC RBC-ENTMCNC: 29.9 PG — SIGNIFICANT CHANGE UP (ref 27–31)
MCHC RBC-ENTMCNC: 33.5 G/DL — SIGNIFICANT CHANGE UP (ref 32–37)
MCV RBC AUTO: 89.2 FL — SIGNIFICANT CHANGE UP (ref 80–94)
MONOCYTES # BLD AUTO: 0.71 K/UL — HIGH (ref 0.1–0.6)
MONOCYTES NFR BLD AUTO: 7.8 % — SIGNIFICANT CHANGE UP (ref 1.7–9.3)
NEUTROPHILS # BLD AUTO: 6.44 K/UL — SIGNIFICANT CHANGE UP (ref 1.4–6.5)
NEUTROPHILS NFR BLD AUTO: 70.8 % — SIGNIFICANT CHANGE UP (ref 42.2–75.2)
NRBC # BLD: 0 /100 WBCS — SIGNIFICANT CHANGE UP (ref 0–0)
PLATELET # BLD AUTO: 296 K/UL — SIGNIFICANT CHANGE UP (ref 130–400)
POTASSIUM SERPL-MCNC: 3.7 MMOL/L — SIGNIFICANT CHANGE UP (ref 3.5–5)
POTASSIUM SERPL-SCNC: 3.7 MMOL/L — SIGNIFICANT CHANGE UP (ref 3.5–5)
RBC # BLD: 3.51 M/UL — LOW (ref 4.7–6.1)
RBC # FLD: 13.2 % — SIGNIFICANT CHANGE UP (ref 11.5–14.5)
SODIUM SERPL-SCNC: 134 MMOL/L — LOW (ref 135–146)
WBC # BLD: 9.1 K/UL — SIGNIFICANT CHANGE UP (ref 4.8–10.8)
WBC # FLD AUTO: 9.1 K/UL — SIGNIFICANT CHANGE UP (ref 4.8–10.8)

## 2021-07-04 PROCEDURE — 99232 SBSQ HOSP IP/OBS MODERATE 35: CPT

## 2021-07-04 RX ORDER — MAGNESIUM SULFATE 500 MG/ML
2 VIAL (ML) INJECTION EVERY 4 HOURS
Refills: 0 | Status: COMPLETED | OUTPATIENT
Start: 2021-07-04 | End: 2021-07-04

## 2021-07-04 RX ADMIN — HEPARIN SODIUM 5000 UNIT(S): 5000 INJECTION INTRAVENOUS; SUBCUTANEOUS at 17:10

## 2021-07-04 RX ADMIN — MEROPENEM 100 MILLIGRAM(S): 1 INJECTION INTRAVENOUS at 13:19

## 2021-07-04 RX ADMIN — POLYMYXIN B SULFATE 500 UNIT(S): 500000 INJECTION, POWDER, LYOPHILIZED, FOR SOLUTION INTRAMUSCULAR; INTRATHECAL; INTRAVENOUS; OPHTHALMIC at 17:10

## 2021-07-04 RX ADMIN — Medication 500000 UNIT(S): at 21:38

## 2021-07-04 RX ADMIN — Medication 50 GRAM(S): at 21:38

## 2021-07-04 RX ADMIN — HEPARIN SODIUM 5000 UNIT(S): 5000 INJECTION INTRAVENOUS; SUBCUTANEOUS at 05:50

## 2021-07-04 RX ADMIN — Medication 500000 UNIT(S): at 05:50

## 2021-07-04 RX ADMIN — CHLORHEXIDINE GLUCONATE 1 APPLICATION(S): 213 SOLUTION TOPICAL at 05:50

## 2021-07-04 RX ADMIN — MEROPENEM 100 MILLIGRAM(S): 1 INJECTION INTRAVENOUS at 05:50

## 2021-07-04 RX ADMIN — Medication 500000 UNIT(S): at 13:18

## 2021-07-04 RX ADMIN — MEROPENEM 100 MILLIGRAM(S): 1 INJECTION INTRAVENOUS at 23:08

## 2021-07-04 RX ADMIN — TAMSULOSIN HYDROCHLORIDE 0.4 MILLIGRAM(S): 0.4 CAPSULE ORAL at 21:38

## 2021-07-04 RX ADMIN — POLYMYXIN B SULFATE 500 UNIT(S): 500000 INJECTION, POWDER, LYOPHILIZED, FOR SOLUTION INTRAMUSCULAR; INTRATHECAL; INTRAVENOUS; OPHTHALMIC at 05:50

## 2021-07-04 RX ADMIN — Medication 64.8 MILLIGRAM(S): at 11:15

## 2021-07-04 RX ADMIN — Medication 50 GRAM(S): at 11:16

## 2021-07-04 RX ADMIN — Medication 50 GRAM(S): at 16:18

## 2021-07-04 NOTE — PROGRESS NOTE ADULT - ASSESSMENT
# Septic shock 2/2 bacteremia from pyelonephritis + obstructive nephrolithiasis    # Obstructive uropathy - resolved  - s/p R PCN on 6/18   - Nephrostomy tube to nephroureteral stent by IR on 6/28   - PCNL procedure by Uro completed 6/29  - Nephrostogram finished on 6/30 successfully  - Blood Cultures 6/18 (+) for carbap. resistant Pseudomonas and E. fecalis; Blood Culx 6/25: NGTD  - Urine Culx 6/18 (+) pseudomonas, proteus, ESBL, s aureus, actinotignum   Plan:   - as per ID reccs: c/w Meropenem and Polymyxin; till 7/5    #Gross Hematuria - resolved  # Bleeding from Nephrostomy tube - resolved  - CT 6/24: appropriately positioned nephrostomy tube with decreased now mild hydroureteronephrosis  - Hb Stable     #acute hypoxic respiratory failure possibly secondary to acute seizure - resolved  - CXR 6/29: Retrated L IF central cath; No evidence of cardiopulmonary disease  - TTE: EF 62%, mild aortic stenosis  - stable room air 7/1    #Hypokalemia   #Hypomagnesemia   - likely 2/2 Polymyxin   - replete     #Seizure disorder  #Hx of cerebral palsy/spastic paraplegia s/p PEG tube   - c/w PO Phenobarbital    #BPH: c/w tamsulosin   #Asthma:  c/w albuterol 90 microgram Q6h PRN        DVT ppx: heparin      #Progress Note Handoff  Pending (specify):  completion of abx, SNF placement   Disposition: SNF

## 2021-07-04 NOTE — SWALLOW BEDSIDE ASSESSMENT ADULT - PHARYNGEAL PHASE
no immediate overt s/s penetration/aspiration/Multiple swallows
no immediate overt s/s penetration/aspiration/Multiple swallows
Within functional limits

## 2021-07-04 NOTE — SWALLOW BEDSIDE ASSESSMENT ADULT - SWALLOW EVAL: RECOMMENDED FEEDING/EATING TECHNIQUES
maintain upright posture during/after eating for 30 mins/oral hygiene/position upright (90 degrees)
crush medication (when feasible)/maintain upright posture during/after eating for 30 mins/oral hygiene/position upright (90 degrees)/small sips/bites
crush medication (when feasible)/maintain upright posture during/after eating for 30 mins/oral hygiene/position upright (90 degrees)/small sips/bites

## 2021-07-04 NOTE — SWALLOW BEDSIDE ASSESSMENT ADULT - ORAL PREPARATORY PHASE
maintains open mouth posture/Reduced oral grading
maintains open mouth posture/Reduced oral grading
Decreased mastication ability

## 2021-07-04 NOTE — SWALLOW BEDSIDE ASSESSMENT ADULT - NS ASR SWALLOW FINDINGS DISCUS
group home aide at the b/s/Physician/Nursing/Patient
group home aide at the b/s/Physician/Nursing/Patient
RN Richard/Nursing

## 2021-07-04 NOTE — SWALLOW BEDSIDE ASSESSMENT ADULT - SLP PERTINENT HISTORY OF CURRENT PROBLEM
64 y/o M w/ PMHx: cerebral palsy, Seizure disorder, spastic paraplegia s/p PEG, BPH, presented from group home ([facility]) with sepsis 2' obstructive nephrolithiasis. CT revealed R proximal ureteral stone with moderate hydroureteronephrosis. On 6/18 pt w/ uroseptic shock; s/p emergent R percutaneous nephrolithotomy. 6/28 pt had nephro uretal stent placed, PCNL 6/29 and nephrostogram 6/30.
64 years old Male with PMHx of Cerebral Palsy, Seizure disorder, spastic paraplegia s/p PEG, BPH, Hx of Nephrolithiasis s/p pcnl x 2 with known incompetent bladder neck s/p SPT presents to ED with Right sided flank pain. Patient seen and examined at bedside, History obtained from group home chart.
64 y/o M w/ PMHx: cerebral palsy, Seizure disorder, spastic paraplegia s/p PEG, BPH, presented from group home ([facility]) with sepsis 2' obstructive nephrolithiasis. CT revealed R proximal ureteral stone with moderate hydroureteronephrosis. On 6/18 pt w/ uroseptic shock; s/p emergent R percutaneous nephrolithotomy. 6/28 pt had nephro uretal stent placed, PCNL 6/29 and nephrostogram 6/30.

## 2021-07-04 NOTE — SWALLOW BEDSIDE ASSESSMENT ADULT - ORAL PHASE
Delayed oral transit time/Lingual stasis
Delayed oral transit time/Lingual stasis
Delayed oral transit time

## 2021-07-04 NOTE — PROGRESS NOTE ADULT - SUBJECTIVE AND OBJECTIVE BOX
Pt seen and examined at bedside.    VITAL SIGNS (Last 24 hrs):  T(C): 37.2 (07-04-21 @ 05:13), Max: 37.2 (07-03-21 @ 16:15)  HR: 85 (07-04-21 @ 05:13) (85 - 103)  BP: 90/54 (07-04-21 @ 05:13) (90/54 - 97/55)  RR: 18 (07-04-21 @ 05:13) (18 - 18)  SpO2: 95% (07-04-21 @ 07:29) (94% - 95%)  Wt(kg): --  Daily     Daily     I&O's Summary    03 Jul 2021 07:01  -  04 Jul 2021 07:00  --------------------------------------------------------  IN: 0 mL / OUT: 1700 mL / NET: -1700 mL        PHYSICAL EXAM:  GENERAL: NAD, contracted   HEAD:  Atraumatic, Normocephalic  EYES: EOMI, PERRLA, conjunctiva and sclera clear  NECK: Supple, No JVD  CHEST/LUNG: Clear to auscultation bilaterally; No wheeze  HEART: Regular rate and rhythm; No murmurs, rubs, or gallops  ABDOMEN: Soft, Nontender, Nondistended; Bowel sounds present  EXTREMITIES:  2+ Peripheral Pulses, No clubbing, cyanosis, or edema  SKIN: No rashes or lesions    Labs Reviewed  Spoke to patient in regards to abnormal labs.    CBC Full  -  ( 04 Jul 2021 08:17 )  WBC Count : 9.10 K/uL  Hemoglobin : 10.5 g/dL  Hematocrit : 31.3 %  Platelet Count - Automated : 296 K/uL  Mean Cell Volume : 89.2 fL  Mean Cell Hemoglobin : 29.9 pg  Mean Cell Hemoglobin Concentration : 33.5 g/dL  Auto Neutrophil # : 6.44 K/uL  Auto Lymphocyte # : 1.57 K/uL  Auto Monocyte # : 0.71 K/uL  Auto Eosinophil # : 0.31 K/uL  Auto Basophil # : 0.03 K/uL  Auto Neutrophil % : 70.8 %  Auto Lymphocyte % : 17.3 %  Auto Monocyte % : 7.8 %  Auto Eosinophil % : 3.4 %  Auto Basophil % : 0.3 %    BMP:    07-04 @ 08:17    Blood Urea Nitrogen - 11  Calcium - 8.4  Carbond Dioxide - 33  Chloride - 93  Creatinine - 0.7  Glucose - 78  Potassium - 3.7  Sodium - 134         MEDICATIONS  (STANDING):  chlorhexidine 4% Liquid 1 Application(s) Topical <User Schedule>  heparin   Injectable 5000 Unit(s) SubCutaneous every 12 hours  magnesium sulfate  IVPB 2 Gram(s) IV Intermittent every 4 hours  meropenem  IVPB 1000 milliGRAM(s) IV Intermittent every 8 hours  nystatin    Suspension 401221 Unit(s) Swish and Swallow three times a day  PHENobarbital 64.8 milliGRAM(s) Oral daily  polymyxin B IVPB 958767 Unit(s) IV Intermittent every 12 hours  tamsulosin 0.4 milliGRAM(s) Oral at bedtime    MEDICATIONS  (PRN):  acetaminophen  Suppository .. 650 milliGRAM(s) Rectal every 6 hours PRN Temp greater or equal to 38C (100.4F)  ALBUTerol    90 MICROgram(s) HFA Inhaler 2 Puff(s) Inhalation every 6 hours PRN Bronchospasm

## 2021-07-04 NOTE — SWALLOW BEDSIDE ASSESSMENT ADULT - ASR SWALLOW ASPIRATION MONITOR
change of breathing pattern/oral hygiene/position upright (90Y)/cough/gurgly voice/fever/pneumonia/throat clearing/upper respiratory infection
oral hygiene/position upright (90Y)
change of breathing pattern/oral hygiene/position upright (90Y)/cough/gurgly voice/fever/pneumonia/throat clearing/upper respiratory infection

## 2021-07-04 NOTE — SWALLOW BEDSIDE ASSESSMENT ADULT - SLP GENERAL OBSERVATIONS
Pt received awake and alert, +room air. +verbally responsive, +dysarthric but intelligible. Able to follow commands, contracted positioning.
Pt. alert on RA
Pt received awake and alert, +room air. +verbally responsive, +dysarthric but intelligible. Able to follow commands, contracted positioning.

## 2021-07-05 ENCOUNTER — TRANSCRIPTION ENCOUNTER (OUTPATIENT)
Age: 66
End: 2021-07-05

## 2021-07-05 LAB
ANION GAP SERPL CALC-SCNC: 14 MMOL/L — SIGNIFICANT CHANGE UP (ref 7–14)
BASOPHILS # BLD AUTO: 0.03 K/UL — SIGNIFICANT CHANGE UP (ref 0–0.2)
BASOPHILS NFR BLD AUTO: 0.6 % — SIGNIFICANT CHANGE UP (ref 0–1)
BUN SERPL-MCNC: 11 MG/DL — SIGNIFICANT CHANGE UP (ref 10–20)
CALCIUM SERPL-MCNC: 8.9 MG/DL — SIGNIFICANT CHANGE UP (ref 8.5–10.1)
CHLORIDE SERPL-SCNC: 92 MMOL/L — LOW (ref 98–110)
CO2 SERPL-SCNC: 32 MMOL/L — SIGNIFICANT CHANGE UP (ref 17–32)
CREAT SERPL-MCNC: 0.8 MG/DL — SIGNIFICANT CHANGE UP (ref 0.7–1.5)
EOSINOPHIL # BLD AUTO: 0.33 K/UL — SIGNIFICANT CHANGE UP (ref 0–0.7)
EOSINOPHIL NFR BLD AUTO: 6.1 % — SIGNIFICANT CHANGE UP (ref 0–8)
GLUCOSE SERPL-MCNC: 146 MG/DL — HIGH (ref 70–99)
HCT VFR BLD CALC: 32 % — LOW (ref 42–52)
HGB BLD-MCNC: 10.9 G/DL — LOW (ref 14–18)
IMM GRANULOCYTES NFR BLD AUTO: 0.6 % — HIGH (ref 0.1–0.3)
LYMPHOCYTES # BLD AUTO: 1.11 K/UL — LOW (ref 1.2–3.4)
LYMPHOCYTES # BLD AUTO: 20.5 % — SIGNIFICANT CHANGE UP (ref 20.5–51.1)
MAGNESIUM SERPL-MCNC: 2.1 MG/DL — SIGNIFICANT CHANGE UP (ref 1.8–2.4)
MCHC RBC-ENTMCNC: 31.2 PG — HIGH (ref 27–31)
MCHC RBC-ENTMCNC: 34.1 G/DL — SIGNIFICANT CHANGE UP (ref 32–37)
MCV RBC AUTO: 91.7 FL — SIGNIFICANT CHANGE UP (ref 80–94)
MONOCYTES # BLD AUTO: 0.53 K/UL — SIGNIFICANT CHANGE UP (ref 0.1–0.6)
MONOCYTES NFR BLD AUTO: 9.8 % — HIGH (ref 1.7–9.3)
NEUTROPHILS # BLD AUTO: 3.39 K/UL — SIGNIFICANT CHANGE UP (ref 1.4–6.5)
NEUTROPHILS NFR BLD AUTO: 62.4 % — SIGNIFICANT CHANGE UP (ref 42.2–75.2)
NRBC # BLD: 0 /100 WBCS — SIGNIFICANT CHANGE UP (ref 0–0)
PLATELET # BLD AUTO: 315 K/UL — SIGNIFICANT CHANGE UP (ref 130–400)
POTASSIUM SERPL-MCNC: 4 MMOL/L — SIGNIFICANT CHANGE UP (ref 3.5–5)
POTASSIUM SERPL-SCNC: 4 MMOL/L — SIGNIFICANT CHANGE UP (ref 3.5–5)
RBC # BLD: 3.49 M/UL — LOW (ref 4.7–6.1)
RBC # FLD: 13.3 % — SIGNIFICANT CHANGE UP (ref 11.5–14.5)
SODIUM SERPL-SCNC: 138 MMOL/L — SIGNIFICANT CHANGE UP (ref 135–146)
WBC # BLD: 5.42 K/UL — SIGNIFICANT CHANGE UP (ref 4.8–10.8)
WBC # FLD AUTO: 5.42 K/UL — SIGNIFICANT CHANGE UP (ref 4.8–10.8)

## 2021-07-05 PROCEDURE — 99232 SBSQ HOSP IP/OBS MODERATE 35: CPT

## 2021-07-05 RX ORDER — ALBUTEROL 90 UG/1
2 AEROSOL, METERED ORAL
Qty: 0 | Refills: 0 | DISCHARGE

## 2021-07-05 RX ORDER — ALBUTEROL 90 UG/1
2 AEROSOL, METERED ORAL
Qty: 3 | Refills: 0
Start: 2021-07-05 | End: 2021-08-03

## 2021-07-05 RX ORDER — LACTULOSE 10 G/15ML
15 SOLUTION ORAL
Qty: 0 | Refills: 0 | DISCHARGE

## 2021-07-05 RX ORDER — ZINC OXIDE 200 MG/G
1 OINTMENT TOPICAL
Qty: 3 | Refills: 0
Start: 2021-07-05 | End: 2021-08-03

## 2021-07-05 RX ORDER — LACTULOSE 10 G/15ML
15 SOLUTION ORAL
Qty: 2 | Refills: 0
Start: 2021-07-05 | End: 2021-08-03

## 2021-07-05 RX ORDER — NYSTATIN 500MM UNIT
5 POWDER (EA) MISCELLANEOUS
Qty: 75 | Refills: 0
Start: 2021-07-05 | End: 2021-07-09

## 2021-07-05 RX ORDER — MAGNESIUM HYDROXIDE 400 MG/1
15 TABLET, CHEWABLE ORAL
Qty: 450 | Refills: 0
Start: 2021-07-05 | End: 2021-08-03

## 2021-07-05 RX ORDER — LACTULOSE 10 G/15ML
15 SOLUTION ORAL
Qty: 0 | Refills: 0 | DISCHARGE
Start: 2021-07-05

## 2021-07-05 RX ORDER — MAGNESIUM HYDROXIDE 400 MG/1
15 TABLET, CHEWABLE ORAL
Qty: 0 | Refills: 0 | DISCHARGE

## 2021-07-05 RX ORDER — TAMSULOSIN HYDROCHLORIDE 0.4 MG/1
1 CAPSULE ORAL
Qty: 30 | Refills: 0
Start: 2021-07-05 | End: 2021-08-03

## 2021-07-05 RX ADMIN — POLYMYXIN B SULFATE 500 UNIT(S): 500000 INJECTION, POWDER, LYOPHILIZED, FOR SOLUTION INTRAMUSCULAR; INTRATHECAL; INTRAVENOUS; OPHTHALMIC at 18:08

## 2021-07-05 RX ADMIN — HEPARIN SODIUM 5000 UNIT(S): 5000 INJECTION INTRAVENOUS; SUBCUTANEOUS at 05:07

## 2021-07-05 RX ADMIN — HEPARIN SODIUM 5000 UNIT(S): 5000 INJECTION INTRAVENOUS; SUBCUTANEOUS at 18:08

## 2021-07-05 RX ADMIN — POLYMYXIN B SULFATE 500 UNIT(S): 500000 INJECTION, POWDER, LYOPHILIZED, FOR SOLUTION INTRAMUSCULAR; INTRATHECAL; INTRAVENOUS; OPHTHALMIC at 06:36

## 2021-07-05 RX ADMIN — CHLORHEXIDINE GLUCONATE 1 APPLICATION(S): 213 SOLUTION TOPICAL at 05:06

## 2021-07-05 RX ADMIN — MEROPENEM 100 MILLIGRAM(S): 1 INJECTION INTRAVENOUS at 05:06

## 2021-07-05 RX ADMIN — Medication 500000 UNIT(S): at 05:07

## 2021-07-05 RX ADMIN — Medication 500000 UNIT(S): at 22:42

## 2021-07-05 RX ADMIN — MEROPENEM 100 MILLIGRAM(S): 1 INJECTION INTRAVENOUS at 22:42

## 2021-07-05 RX ADMIN — Medication 64.8 MILLIGRAM(S): at 14:12

## 2021-07-05 RX ADMIN — MEROPENEM 100 MILLIGRAM(S): 1 INJECTION INTRAVENOUS at 14:15

## 2021-07-05 RX ADMIN — TAMSULOSIN HYDROCHLORIDE 0.4 MILLIGRAM(S): 0.4 CAPSULE ORAL at 22:42

## 2021-07-05 NOTE — DISCHARGE NOTE PROVIDER - NSDCCPCAREPLAN_GEN_ALL_CORE_FT
PRINCIPAL DISCHARGE DIAGNOSIS  Diagnosis: Kidney stone  Assessment and Plan of Treatment: You were admitted to the hospital because of pain and worsening kidney function due to obstructing kidney stone. In the hospital you were found to have an infection of your urinary tract. The obstruction was relieved with a percutaneous nephrostomy tube placement and the renal stone was removed with ureteroscopy.   Your kidney's function has improved and the infection has resolved.   PLEASE FOLLOW UP WITH YOUR PRIMARY CARE PHYSICIAN REGULARLY AND FOLLOW UP WITH [facility] UROLOGIST WITHIN 2 WEEKS.   SEEK IMMEDIATE MEDICAL ATTENTION IF YOU HAVE FEVER, BURNING URINATION, ABDOMINAL OR FLANK PAIN, DECREASED URINE OUTPUT, LEG SWELLING.      SECONDARY DISCHARGE DIAGNOSES  Diagnosis: UTI (urinary tract infection)  Assessment and Plan of Treatment: You were treated in the hospital for an infection of your kidney- called pyelonephritis. You have completed antibiotic course and the infection has resolved. Please follow up regularly with your primary care physician and urologist.   Call 911 and return to the emergency room if you have chest pain, difficulty breathing, high fevers, worsening of your symptoms, feel unwell or have nausea and vomiting, abdominal pain, flank pain.      Diagnosis: Cerebral palsy  Assessment and Plan of Treatment: Continue PEG tube feeding, can take Dysphagia 1 pureed diet with -Honey consistency fluid; follow up with Gastroenterologist to discuss possible removal of the PEG tube if tolerating adequate oral nutrition.   Continue care of suprapubic catheter. Take precautions to prevent pressure ulcers - frequent turning and off loading of pressure points.

## 2021-07-05 NOTE — PROGRESS NOTE ADULT - ASSESSMENT
65 year old male with CP currently admitted for Abx therapy of bacteremia secondary to pyelonephritis secondary to nephrolithiasis. s/p PCNL (6/29/2021). Afebrile, hemodynamically stable    # Septic shock 2/2 bacteremia from pyelonephritis + obstructive nephrolithiasis    # Obstructive uropathy - resolved  - s/p R PCN on 6/18   - Nephrostomy tube to nephroureteral stent by IR on 6/28   - PCNL procedure by Uro completed 6/29  - Nephrostogram finished on 6/30 successfully  - Blood Cultures 6/18 (+) for carbap. resistant Pseudomonas and E. fecalis; Blood Culx 6/25: NGTD  - Urine Culx 6/18 (+) pseudomonas, proteus, ESBL, s aureus, actinotignum   Plan:   - as per ID reccs: c/w Meropenem and Polymyxin; till 7/5    #Gross Hematuria - resolved  # Bleeding from Nephrostomy tube - resolved  - CT 6/24: appropriately positioned nephrostomy tube with decreased now mild hydroureteronephrosis  - Hb Stable     #acute hypoxic respiratory failure possibly secondary to acute seizure - resolved  - CXR 6/29: Retrated L IF central cath; No evidence of cardiopulmonary disease  - TTE: EF 62%, mild aortic stenosis  - stable room air 7/1    #Hypokalemia   #Hypomagnesemia   - likely 2/2 Polymyxin   - replete     #Seizure disorder  #Hx of cerebral palsy/spastic paraplegia s/p PEG tube   - c/w PO Phenobarbital    #BPH: c/w tamsulosin   #Asthma:  c/w albuterol 90 microgram Q6h PRN                                                                            ----------------------------------------------------  # DVT prophylaxis     # GI prophylaxis     # Diet     # Activity Score (AM-PAC)    # Code status     # Disposition                                                                              --------------------------------------------------------    # Handoff    65 year old male with CP currently admitted for Abx therapy of bacteremia secondary to pyelonephritis secondary to nephrolithiasis. s/p PCNL (6/29/2021). Afebrile, hemodynamically stable    # Septic shock 2/2 bacteremia from pyelonephritis + obstructive nephrolithiasis  - Resolved  # Obstructive uropathy - resolved  - s/p R PCN on 6/18   - Nephrostomy tube to nephroureteral stent by IR on 6/28   - PCNL procedure by Uro completed 6/29  - Nephrostogram finished on 6/30 successfully  - Blood Cultures 6/18 (+) for carbap. resistant Pseudomonas and E. fecalis; Blood Culx 6/25: NGTD  - Urine Culx 6/18 (+) pseudomonas, proteus, ESBL, s aureus, actinotignum   Plan:   - as per ID reccs: c/w Meropenem and Polymyxin; till 7/5 (completes today)  - Urostomy removed by Urology on 07/02    # Gross Hematuria - resolved  # Bleeding from Nephrostomy tube - resolved  - CT 6/24: appropriately positioned nephrostomy tube with decreased now mild hydroureteronephrosis  - Hb Stable     # Acute hypoxic respiratory failure possibly secondary to acute seizure - resolved  - CXR 6/29: Retrated L IF central cath; No evidence of cardiopulmonary disease  - TTE: EF 62%, mild aortic stenosis  - Stable room air 7/1    # Hypokalemia   # Hypomagnesemia  - likely 2/2 Polymyxin   - replete    # Seizure disorder  # Hx of cerebral palsy/spastic paraplegia s/p PEG tube   - c/w PO Phenobarbital    # BPH: c/w tamsulosin   # Asthma:  c/w albuterol 90 microgram Q6h PRN                                                                            ----------------------------------------------------  # DVT prophylaxis : Heparin s/q  # GI prophylaxis : NA  # Diet : Dysphagia 1 pureed honey consistency  # Code status: Full  # Disposition: Group home; pending -ve covid swab and completion of antibiotic course.

## 2021-07-05 NOTE — DISCHARGE NOTE PROVIDER - NSDCFUADDINST_GEN_ALL_CORE_FT
Wound care:   1. Ulcerations left outer buttock-left trochanter area - Cleanse wound bed w/ normal saline, gently pat dry.  Apply thin layer of Coloplast Triad hydrophilic ointment to absorb wound exudate, provide protective layer, fill in wound depth & assist w/ autolytic debridement of devitalized tissue. Cover w/ dry gauze dressing, and foam Allevyn dressing. Apply Triad & change dressing q12h and prn for strike-through drainage or soiling. If top layer of Triad soiled, gently remove w/ perineal cleanser and re-apply ointment. Do not scrub off as this may cause further skin breakdown. Do not apply foam Allevyn dressing directly over Triad (use gauze in between) as ointment gets absorbed into dressing as opposed to being in direct contact w/ wound bed.    2. IAD b/l buttock-intergluteal cleft, perineum, posterior thighs - Continue cleansing skin w/ perineal cleanser, gently pat dry. Continue applying Coloplast Wellington Protect moisture barrier cream daily and prn after each incontinent episode.      Additional recs:  -Continue turning/positioning patient from side-to-side q2h while in bed, q1h when/if OOB chair, or in accordance w/ pt's plan of care. Continue utilizing pillows and/or z-isela fluidized positioner to assist w/ turning/positioning. When/if OOB chair, utilize pillows or chair cushion to offload pressure. Continue utilizing pillow, towel/sheet roll in between contracted BLEs.   - Continue to offload heels from bed surface with soft pillow under calfs or by applying offloading boots to BLEs.   - Continue utilizing one underpad underneath patient to contain incontinence episodes; change pad when saturated/soiled.   - Continue nutrition consult for optimal wound healing & nutritional status.

## 2021-07-05 NOTE — PROGRESS NOTE ADULT - SUBJECTIVE AND OBJECTIVE BOX
** This is an incomplete note - pending AM rounds**   TISH SHAIKH 65y Male  MRN#: [id number]   CODE STATUS:________    Hospital Day: 18d    Pt is currently admitted with the primary diagnosis of     SUBJECTIVE  Hospital Course    Overnight events     Subjective complaints     Present Today:   - Edouard:  No [  ], Yes [   ] : Indication:     - Type of IV Access:       .. CVC/Piccline:  No [  ], Yes [   ] : Indication:       .. Midline: No [  ], Yes [   ] : Indication:                                             ----------------------------------------------------------  OBJECTIVE  PAST MEDICAL & SURGICAL HISTORY  BPH (benign prostatic hyperplasia)    Cerebral palsy    Seizure  last seizure &gt;10 years ago    Osteoporosis    Spastic quadriplegia    Urinary calculi    Urinary retention    Asthma    S/P percutaneous endoscopic gastrostomy (PEG) tube placement    H/O cystoscopy    Suprapubic catheter                                              -----------------------------------------------------------  ALLERGIES:  No Known Allergies                                            ------------------------------------------------------------    HOME MEDICATIONS  Home Medications:  Albuterol (Eqv-ProAir HFA) 90 mcg/inh inhalation aerosol: 2 puff(s) inhaled every 6 hours (18 Jun 2021 01:48)  albuterol 2.5 mg/3 mL (0.083%) inhalation solution: 3 milliliter(s) inhaled every 6 hours (18 Jun 2021 01:48)  lactulose 10 g/15 mL oral syrup: 15 milliliter(s) orally once a day (18 Jun 2021 01:48)  Milk of Magnesia 8% oral suspension: 15 milliliter(s) orally once a day (at bedtime) (18 Jun 2021 01:48)  PHENobarbital 64.8 mg oral tablet: 1 tab(s) orally once a day via G tube (18 Jun 2021 01:48)                           MEDICATIONS:  STANDING MEDICATIONS  chlorhexidine 4% Liquid 1 Application(s) Topical <User Schedule>  heparin   Injectable 5000 Unit(s) SubCutaneous every 12 hours  meropenem  IVPB 1000 milliGRAM(s) IV Intermittent every 8 hours  nystatin    Suspension 478012 Unit(s) Swish and Swallow three times a day  PHENobarbital 64.8 milliGRAM(s) Oral daily  polymyxin B IVPB 837448 Unit(s) IV Intermittent every 12 hours  tamsulosin 0.4 milliGRAM(s) Oral at bedtime    PRN MEDICATIONS  acetaminophen  Suppository .. 650 milliGRAM(s) Rectal every 6 hours PRN  ALBUTerol    90 MICROgram(s) HFA Inhaler 2 Puff(s) Inhalation every 6 hours PRN                                            ------------------------------------------------------------  VITAL SIGNS: Last 24 Hours  T(C): 36.1 (05 Jul 2021 04:51), Max: 37.3 (04 Jul 2021 14:46)  T(F): 97 (05 Jul 2021 04:51), Max: 99.2 (04 Jul 2021 14:46)  HR: 97 (05 Jul 2021 05:08) (75 - 97)  BP: 109/60 (05 Jul 2021 05:08) (89/52 - 109/60)  BP(mean): --  RR: 18 (05 Jul 2021 04:51) (18 - 19)  SpO2: 95% (04 Jul 2021 07:29) (95% - 95%)      07-04-21 @ 07:01  -  07-05-21 @ 07:00  --------------------------------------------------------  IN: 0 mL / OUT: 1350 mL / NET: -1350 mL                                             --------------------------------------------------------------  LABS:                        10.5   9.10  )-----------( 296      ( 04 Jul 2021 08:17 )             31.3     07-04    134<L>  |  93<L>  |  11  ----------------------------<  78  3.7   |  33<H>  |  0.7    Ca    8.4<L>      04 Jul 2021 08:17  Mg     1.0     07-04                                                                -------------------------------------------------------------  RADIOLOGY:                                            --------------------------------------------------------------    PHYSICAL EXAM:  General:   HEENT:  LUNGS:  HEART:  ABDOMEN:  EXT:  NEURO:  SKIN:                                           -------------------------------------------------------------- TISH SHAIKH 65y Male  MRN#: [id number]   CODE STATUS: Full    Hospital Day: 18d    Pt is currently admitted with the primary diagnosis of     SUBJECTIVE  Hospital Course  65y M with PMHx of cerebral palsy, Seizure disorder, spastic paraplegia s/p PEG, BPH, Hx of Nephrolithiasis s/p pcnl x 2 (last oen in 5/20/21) with known incompetent bladder neck s/p Suprapubic Tube Placement admitted on 6/17/21 with sepsis 2/2 obstructive nephrolithiasis. CT revealed R proximal ureteral stone with moderate hydroureteronephrosis. On 6/18, pt decompensated to uroseptic shock (T 103F, BP 75/45). Emergent R Percutaneous Nephrolithotomy performed by IR on 6/18/2021, Levophed started. BP improved following procedure and LR bolus. Levophed was discontinued on 6/20, and patient has been hemodynamically stable since. Pt was weaned off High Flow to nasal canula (6/22).     As of 6/24, pt was cleared by ID for any procedures.   On 6/25, IR was contacted for conversion of nephrostomy to nephro ureteral stent, as well assessment of PEG tube which is clogged.   On 6/28, pt underwent PCN to Nephroureteral Stent on 6/28, with PCNL on 6/29. On 6/28, IR will also assess PEG tube.     On 6/30, pt did nephrostogram w/ removal.   7/2: No acute issues at this time, resting comfortably. Patient had his RIGHT PCN removed by IR 2 days ago. RIGHT urostomy bag in place. Patient was downgraded to the floor on 7/1/21 in stable condition. Planning to continue IV Abx as of   07/02 - the rt urostomy bag removed by urology.   7/3: Doing well. no complaints. Good calorie intake  07/05 - patient completed the course of antibiotics, has been hemodynamically stable and afebrile, Creatinine 0.8.     Overnight events   Patient was seen at the bedside this morning. He is lying comfortably on the bed. There were no acute events overnight.  Patient denies any chest pain, shortness of breath, cough, nausea, vomiting, dizziness.    Present Today:   - Edouard:  suprapubic catheter + (chronic)   - PEG tube +  - Type of IV Access:       .. CVC/Piccline:  No [  ], Yes [   ] : Indication:       .. Midline: No [  ], Yes [   ] : Indication:                                            ----------------------------------------------------------  OBJECTIVE  PAST MEDICAL & SURGICAL HISTORY  BPH (benign prostatic hyperplasia)    Cerebral palsy    Seizure  last seizure &gt;10 years ago    Osteoporosis    Spastic quadriplegia    Urinary calculi    Urinary retention    Asthma    S/P percutaneous endoscopic gastrostomy (PEG) tube placement    H/O cystoscopy    Suprapubic catheter                                              -----------------------------------------------------------  ALLERGIES:  No Known Allergies                                            ------------------------------------------------------------    HOME MEDICATIONS  Home Medications:  Albuterol (Eqv-ProAir HFA) 90 mcg/inh inhalation aerosol: 2 puff(s) inhaled every 6 hours (18 Jun 2021 01:48)  albuterol 2.5 mg/3 mL (0.083%) inhalation solution: 3 milliliter(s) inhaled every 6 hours (18 Jun 2021 01:48)  lactulose 10 g/15 mL oral syrup: 15 milliliter(s) orally once a day (18 Jun 2021 01:48)  Milk of Magnesia 8% oral suspension: 15 milliliter(s) orally once a day (at bedtime) (18 Jun 2021 01:48)  PHENobarbital 64.8 mg oral tablet: 1 tab(s) orally once a day via G tube (18 Jun 2021 01:48)                           MEDICATIONS:  STANDING MEDICATIONS  chlorhexidine 4% Liquid 1 Application(s) Topical <User Schedule>  heparin   Injectable 5000 Unit(s) SubCutaneous every 12 hours  meropenem  IVPB 1000 milliGRAM(s) IV Intermittent every 8 hours  nystatin    Suspension 981224 Unit(s) Swish and Swallow three times a day  PHENobarbital 64.8 milliGRAM(s) Oral daily  polymyxin B IVPB 098786 Unit(s) IV Intermittent every 12 hours  tamsulosin 0.4 milliGRAM(s) Oral at bedtime    PRN MEDICATIONS  acetaminophen  Suppository .. 650 milliGRAM(s) Rectal every 6 hours PRN  ALBUTerol    90 MICROgram(s) HFA Inhaler 2 Puff(s) Inhalation every 6 hours PRN                                            ------------------------------------------------------------  VITAL SIGNS: Last 24 Hours  T(C): 36.1 (05 Jul 2021 04:51), Max: 37.3 (04 Jul 2021 14:46)  T(F): 97 (05 Jul 2021 04:51), Max: 99.2 (04 Jul 2021 14:46)  HR: 97 (05 Jul 2021 05:08) (75 - 97)  BP: 109/60 (05 Jul 2021 05:08) (89/52 - 109/60)  BP(mean): --  RR: 18 (05 Jul 2021 04:51) (18 - 19)  SpO2: 95% (04 Jul 2021 07:29) (95% - 95%)      07-04-21 @ 07:01  -  07-05-21 @ 07:00  --------------------------------------------------------  IN: 0 mL / OUT: 1350 mL / NET: -1350 mL                                             --------------------------------------------------------------  LABS:                        10.5   9.10  )-----------( 296      ( 04 Jul 2021 08:17 )             31.3     07-04    134<L>  |  93<L>  |  11  ----------------------------<  78  3.7   |  33<H>  |  0.7    Ca    8.4<L>      04 Jul 2021 08:17  Mg     1.0     07-04                                                                -------------------------------------------------------------  RADIOLOGY:                                            --------------------------------------------------------------    PHYSICAL EXAM:  General: No acute distress; Pallor (-), Icterus (-), Cyanosis (-), Clubbing (-)  HEENT: Normocephalic, atraumatic, PERRLA, EOMI  PULM: Bilaterally equal and clear breath sounds, wheeze (-), rubs (-), crackles (-)  CVS: Normal S1 and S2, murmurs (-), rubs (-), gallops (-)   GI: Soft, nondistended, nontender, BS +; PEG tube in place, no drainage noted.   MSK: Edema (-), no muscle, bone or joint tenderness noted  SKIN: Warm and well perfused, no rashes noted  NEURO:  Alert and Oriented x 3; spastic paraplegia at baseline.

## 2021-07-05 NOTE — PROGRESS NOTE ADULT - SUBJECTIVE AND OBJECTIVE BOX
Pt seen and examined at bedside.     VITAL SIGNS (Last 24 hrs):  T(C): 36.7 (07-05-21 @ 14:43), Max: 36.7 (07-05-21 @ 14:43)  HR: 100 (07-05-21 @ 14:43) (75 - 100)  BP: 93/56 (07-05-21 @ 14:43) (89/52 - 109/60)  RR: 19 (07-05-21 @ 14:43) (18 - 19)  SpO2: --  Wt(kg): --  Daily     Daily     I&O's Summary    04 Jul 2021 07:01  -  05 Jul 2021 07:00  --------------------------------------------------------  IN: 0 mL / OUT: 1350 mL / NET: -1350 mL        PHYSICAL EXAM:  GENERAL: NAD   HEAD:  Atraumatic, Normocephalic  EYES: conjunctiva and sclera clear  NECK: Supple, No JVD  CHEST/LUNG: Clear to auscultation bilaterally; No wheeze  HEART: Regular rate and rhythm; No murmurs, rubs, or gallops  ABDOMEN: Soft, Nontender, Nondistended; Bowel sounds present  EXTREMITIES:  2+ Peripheral Pulses, No clubbing, cyanosis, or edema  SKIN: No rashes or lesions    Labs Reviewed  Spoke to patient in regards to abnormal labs.    CBC Full  -  ( 05 Jul 2021 07:10 )  WBC Count : 5.42 K/uL  Hemoglobin : 10.9 g/dL  Hematocrit : 32.0 %  Platelet Count - Automated : 315 K/uL  Mean Cell Volume : 91.7 fL  Mean Cell Hemoglobin : 31.2 pg  Mean Cell Hemoglobin Concentration : 34.1 g/dL  Auto Neutrophil # : 3.39 K/uL  Auto Lymphocyte # : 1.11 K/uL  Auto Monocyte # : 0.53 K/uL  Auto Eosinophil # : 0.33 K/uL  Auto Basophil # : 0.03 K/uL  Auto Neutrophil % : 62.4 %  Auto Lymphocyte % : 20.5 %  Auto Monocyte % : 9.8 %  Auto Eosinophil % : 6.1 %  Auto Basophil % : 0.6 %    BMP:    07-05 @ 07:10    Blood Urea Nitrogen - 11  Calcium - 8.9  Carbond Dioxide - 32  Chloride - 92  Creatinine - 0.8  Glucose - 146  Potassium - 4.0  Sodium - 138       MEDICATIONS  (STANDING):  chlorhexidine 4% Liquid 1 Application(s) Topical <User Schedule>  heparin   Injectable 5000 Unit(s) SubCutaneous every 12 hours  meropenem  IVPB 1000 milliGRAM(s) IV Intermittent every 8 hours  nystatin    Suspension 854155 Unit(s) Swish and Swallow three times a day  PHENobarbital 64.8 milliGRAM(s) Oral daily  polymyxin B IVPB 491823 Unit(s) IV Intermittent every 12 hours  tamsulosin 0.4 milliGRAM(s) Oral at bedtime    MEDICATIONS  (PRN):  acetaminophen  Suppository .. 650 milliGRAM(s) Rectal every 6 hours PRN Temp greater or equal to 38C (100.4F)  ALBUTerol    90 MICROgram(s) HFA Inhaler 2 Puff(s) Inhalation every 6 hours PRN Bronchospasm

## 2021-07-05 NOTE — DISCHARGE NOTE PROVIDER - PROVIDER TOKENS
PROVIDER:[TOKEN:[[id number]],FOLLOWUP:[2 weeks]],PROVIDER:[TOKEN:[[id number]],FOLLOWUP:[2 weeks]]

## 2021-07-05 NOTE — DISCHARGE NOTE PROVIDER - HOSPITAL COURSE
64 years old Male with PMHx of Cerebral Palsy, Seizure disorder, spastic paraplegia s/p PEG, BPH, Hx of Nephrolithiasis s/p pcnl x 2 with known incompetent bladder neck s/p SPT presents to ED with Right sided flank pain for 1 day, radiating to RLQ, 10/10 with associated nausea/vomiting. Patient had Right-sided PCNL on 5/20. Denieed any fever, chills, SOB, CP, dysuria, hematuria at presentation.   In ED, vitals were WNL, Labs significant for elevated lactate 2.2, UA + for bacteruria, LKE +.  CT A/P shows Delayed right nephrogram. Moderate right hydroureteronephrosis secondary to an obstructing right proximal ureteral stone. Stone size difficult to measure due to extensive artifact. This stone appeared to measure at least 6 mm craniocaudally (max ). He also had a right upper pole stone. Patient received 8mg of Morphine, IVF, Zofran, toradol and cefepime in ED and admitted to medicine for further management of right renal colic with hydroureteronephrosis and postobstructive uropathy.   Patient had Rt. Percutaneous nephrostomy tube placed by IR on 06/18, post-operative course complicated by hypotension and altered mental status d/t urosepsis. He was treated with IV fluids, IV antibiotic course and was upgraded to step-down unit for close monitoring as he was requiring low dose pressors to maintain BP.   Urine culture and blood culture from 06/18 were positive for - P. aeruginosa, E. fecalis, P. mirabilis (ESBL). Patient was treated with a course of IV meropenem 2g q8h and Polymyxin 700,000U q12h from 06/22 to 07/05 (7 days post-op).   Antegrade ureteroscopy and percutaneous removal of the right renal stone was done by urology on 06/29.   The nephrostomy tube was removed on 07/01 and urostomy bag were removed on 07/02.     Patient has been afebrile since 06/25 and leukocytosis has resolved. Repeat blood cultures have been negative x 7 and urine cultures from the nephrostomy tube done on 06/22 was negative. Patient has been pain free and mental status has improved. He has been hemodynamically stable and the LANA has resolved. Patient stable for discharge to the group home.   He will need outpatient follow up with PCP and urology. 64 years old Male with PMHx of Cerebral Palsy, Seizure disorder, spastic paraplegia s/p PEG, BPH, Hx of Nephrolithiasis s/p pcnl x 2 with known incompetent bladder neck s/p SPT presents to ED with Right sided flank pain for 1 day, radiating to RLQ, 10/10 with associated nausea/vomiting. Patient had Right-sided PCNL on 5/20. Denieed any fever, chills, SOB, CP, dysuria, hematuria at presentation.   In ED, vitals were WNL, Labs significant for elevated lactate 2.2, UA + for bacteruria, LKE +.  CT A/P shows Delayed right nephrogram. Moderate right hydroureteronephrosis secondary to an obstructing right proximal ureteral stone. Stone size difficult to measure due to extensive artifact. This stone appeared to measure at least 6 mm craniocaudally (max ). He also had a right upper pole stone. Patient received 8mg of Morphine, IVF, Zofran, toradol and cefepime in ED and admitted to medicine for further management of right renal colic with hydroureteronephrosis and postobstructive uropathy.   Patient had Rt. Percutaneous nephrostomy tube placed by IR on 06/18, post-operative course complicated by hypotension and altered mental status d/t urosepsis. He was treated with IV fluids, IV antibiotic course and was upgraded to step-down unit for close monitoring as he was requiring low dose pressors to maintain BP.   Urine culture and blood culture from 06/18 were positive for - P. aeruginosa, E. fecalis, P. mirabilis (ESBL). Patient was treated with a course of IV meropenem 2g q8h and Polymyxin 700,000U q12h from 06/22 to 07/05 (7 days post-op).   Antegrade ureteroscopy and percutaneous removal of the right renal stone was done by urology on 06/29.   The nephrostomy tube was removed on 07/01 and urostomy bag were removed on 07/02.     Patient has been afebrile since 06/25 and leukocytosis has resolved. Repeat blood cultures have been negative x 7 and urine cultures from the nephrostomy tube done on 06/22 was negative. Patient has been pain free and mental status has improved. He has been hemodynamically stable and the LANA has resolved. Patient stable for discharge to the group home.   He will need outpatient follow up with PCP and urology.     Attending addendum: Patient seen and examined. Patient is stable for discharge. Patient completed course of abx. Med rec reviewed.

## 2021-07-05 NOTE — DISCHARGE NOTE PROVIDER - INSTRUCTIONS
Dysphagia 1 pureed - Honey consistency fluid; Provide tube feed if Meal intake <50%; hold Tube feed if meal intake >50%  Tube feed: Jevity 1.2 Sarmad - Bolus 480ml q8h

## 2021-07-05 NOTE — PROGRESS NOTE ADULT - ASSESSMENT
# Septic shock 2/2 bacteremia from pyelonephritis + obstructive nephrolithiasis    # Obstructive uropathy - resolved  - s/p R PCN on 6/18   - Nephrostomy tube to nephroureteral stent by IR on 6/28   - PCNL procedure by Uro completed 6/29  - Nephrostogram finished on 6/30 successfully  - Blood Cultures 6/18 (+) for carbap. resistant Pseudomonas and E. fecalis; Blood Culx 6/25: NGTD  - Urine Culx 6/18 (+) pseudomonas, proteus, ESBL, s aureus, actinotignum   Plan:   - as per ID reccs: c/w Meropenem and Polymyxin; till 7/5    #Gross Hematuria - resolved  # Bleeding from Nephrostomy tube - resolved  - CT 6/24: appropriately positioned nephrostomy tube with decreased now mild hydroureteronephrosis  - Hb Stable     #acute hypoxic respiratory failure possibly secondary to acute seizure - resolved  - CXR 6/29: Retrated L IF central cath; No evidence of cardiopulmonary disease  - TTE: EF 62%, mild aortic stenosis  - stable room air 7/1    #Hypokalemia   #Hypomagnesemia   - likely 2/2 Polymyxin   - replete     #Seizure disorder  #Hx of cerebral palsy/spastic paraplegia s/p PEG tube   - c/w PO Phenobarbital    #BPH: c/w tamsulosin   #Asthma:  c/w albuterol 90 microgram Q6h PRN        DVT ppx: heparin      #Progress Note Handoff  Pending (specify):  group home placement tomorrow, IV abx completion today   Disposition: Group home

## 2021-07-06 ENCOUNTER — TRANSCRIPTION ENCOUNTER (OUTPATIENT)
Age: 66
End: 2021-07-06

## 2021-07-06 VITALS
RESPIRATION RATE: 19 BRPM | TEMPERATURE: 97 F | HEART RATE: 104 BPM | SYSTOLIC BLOOD PRESSURE: 102 MMHG | DIASTOLIC BLOOD PRESSURE: 55 MMHG

## 2021-07-06 LAB
ALBUMIN SERPL ELPH-MCNC: 3 G/DL — LOW (ref 3.5–5.2)
ALP SERPL-CCNC: 71 U/L — SIGNIFICANT CHANGE UP (ref 30–115)
ALT FLD-CCNC: 12 U/L — SIGNIFICANT CHANGE UP (ref 0–41)
ANION GAP SERPL CALC-SCNC: 7 MMOL/L — SIGNIFICANT CHANGE UP (ref 7–14)
AST SERPL-CCNC: 20 U/L — SIGNIFICANT CHANGE UP (ref 0–41)
BASOPHILS # BLD AUTO: 0.03 K/UL — SIGNIFICANT CHANGE UP (ref 0–0.2)
BASOPHILS NFR BLD AUTO: 0.6 % — SIGNIFICANT CHANGE UP (ref 0–1)
BILIRUB SERPL-MCNC: 0.3 MG/DL — SIGNIFICANT CHANGE UP (ref 0.2–1.2)
BUN SERPL-MCNC: 11 MG/DL — SIGNIFICANT CHANGE UP (ref 10–20)
CALCIUM SERPL-MCNC: 8.7 MG/DL — SIGNIFICANT CHANGE UP (ref 8.5–10.1)
CHLORIDE SERPL-SCNC: 93 MMOL/L — LOW (ref 98–110)
CO2 SERPL-SCNC: 34 MMOL/L — HIGH (ref 17–32)
CREAT SERPL-MCNC: 0.7 MG/DL — SIGNIFICANT CHANGE UP (ref 0.7–1.5)
EOSINOPHIL # BLD AUTO: 0.34 K/UL — SIGNIFICANT CHANGE UP (ref 0–0.7)
EOSINOPHIL NFR BLD AUTO: 6.8 % — SIGNIFICANT CHANGE UP (ref 0–8)
GLUCOSE SERPL-MCNC: 149 MG/DL — HIGH (ref 70–99)
HCT VFR BLD CALC: 30.8 % — LOW (ref 42–52)
HGB BLD-MCNC: 10.5 G/DL — LOW (ref 14–18)
IMM GRANULOCYTES NFR BLD AUTO: 0.4 % — HIGH (ref 0.1–0.3)
LYMPHOCYTES # BLD AUTO: 1.14 K/UL — LOW (ref 1.2–3.4)
LYMPHOCYTES # BLD AUTO: 22.8 % — SIGNIFICANT CHANGE UP (ref 20.5–51.1)
MAGNESIUM SERPL-MCNC: 1.2 MG/DL — LOW (ref 1.8–2.4)
MCHC RBC-ENTMCNC: 30.3 PG — SIGNIFICANT CHANGE UP (ref 27–31)
MCHC RBC-ENTMCNC: 34.1 G/DL — SIGNIFICANT CHANGE UP (ref 32–37)
MCV RBC AUTO: 89 FL — SIGNIFICANT CHANGE UP (ref 80–94)
MONOCYTES # BLD AUTO: 0.54 K/UL — SIGNIFICANT CHANGE UP (ref 0.1–0.6)
MONOCYTES NFR BLD AUTO: 10.8 % — HIGH (ref 1.7–9.3)
NEUTROPHILS # BLD AUTO: 2.94 K/UL — SIGNIFICANT CHANGE UP (ref 1.4–6.5)
NEUTROPHILS NFR BLD AUTO: 58.6 % — SIGNIFICANT CHANGE UP (ref 42.2–75.2)
NRBC # BLD: 0 /100 WBCS — SIGNIFICANT CHANGE UP (ref 0–0)
PLATELET # BLD AUTO: 291 K/UL — SIGNIFICANT CHANGE UP (ref 130–400)
POTASSIUM SERPL-MCNC: 3.8 MMOL/L — SIGNIFICANT CHANGE UP (ref 3.5–5)
POTASSIUM SERPL-SCNC: 3.8 MMOL/L — SIGNIFICANT CHANGE UP (ref 3.5–5)
PROT SERPL-MCNC: 6 G/DL — SIGNIFICANT CHANGE UP (ref 6–8)
RBC # BLD: 3.46 M/UL — LOW (ref 4.7–6.1)
RBC # FLD: 13.2 % — SIGNIFICANT CHANGE UP (ref 11.5–14.5)
SARS-COV-2 RNA SPEC QL NAA+PROBE: SIGNIFICANT CHANGE UP
SODIUM SERPL-SCNC: 134 MMOL/L — LOW (ref 135–146)
WBC # BLD: 5.01 K/UL — SIGNIFICANT CHANGE UP (ref 4.8–10.8)
WBC # FLD AUTO: 5.01 K/UL — SIGNIFICANT CHANGE UP (ref 4.8–10.8)

## 2021-07-06 PROCEDURE — 99239 HOSP IP/OBS DSCHRG MGMT >30: CPT

## 2021-07-06 RX ORDER — MAGNESIUM SULFATE 500 MG/ML
2 VIAL (ML) INJECTION
Refills: 0 | Status: COMPLETED | OUTPATIENT
Start: 2021-07-06 | End: 2021-07-06

## 2021-07-06 RX ADMIN — Medication 64.8 MILLIGRAM(S): at 13:15

## 2021-07-06 RX ADMIN — Medication 500000 UNIT(S): at 05:55

## 2021-07-06 RX ADMIN — HEPARIN SODIUM 5000 UNIT(S): 5000 INJECTION INTRAVENOUS; SUBCUTANEOUS at 05:55

## 2021-07-06 RX ADMIN — MEROPENEM 100 MILLIGRAM(S): 1 INJECTION INTRAVENOUS at 05:55

## 2021-07-06 RX ADMIN — Medication 50 GRAM(S): at 11:07

## 2021-07-06 RX ADMIN — CHLORHEXIDINE GLUCONATE 1 APPLICATION(S): 213 SOLUTION TOPICAL at 05:55

## 2021-07-06 RX ADMIN — Medication 50 GRAM(S): at 15:04

## 2021-07-06 RX ADMIN — POLYMYXIN B SULFATE 500 UNIT(S): 500000 INJECTION, POWDER, LYOPHILIZED, FOR SOLUTION INTRAMUSCULAR; INTRATHECAL; INTRAVENOUS; OPHTHALMIC at 05:54

## 2021-07-06 RX ADMIN — Medication 50 GRAM(S): at 13:16

## 2021-07-06 RX ADMIN — Medication 500000 UNIT(S): at 13:16

## 2021-07-06 NOTE — PROGRESS NOTE ADULT - PROVIDER SPECIALTY LIST ADULT
Critical Care
Infectious Disease
Internal Medicine
Internal Medicine
Intervent Radiology
Urology
Critical Care
Critical Care
Infectious Disease
Internal Medicine
Intervent Radiology
Intervent Radiology
Pulmonology
Urology
Critical Care
Internal Medicine
Intervent Radiology
Intervent Radiology
Pulmonology
Urology
Critical Care
Infectious Disease
Internal Medicine
Pulmonology
Urology
Critical Care
Critical Care
Infectious Disease
Infectious Disease
Internal Medicine
Internal Medicine
Urology
Infectious Disease
Infectious Disease
Critical Care

## 2021-07-06 NOTE — PROGRESS NOTE ADULT - ASSESSMENT
65 year old male with CP currently admitted for Abx therapy of bacteremia secondary to pyelonephritis secondary to nephrolithiasis. s/p PCNL (6/29/2021). Afebrile, hemodynamically stable    # Septic shock 2/2 bacteremia from pyelonephritis + obstructive nephrolithiasis  - Resolved  # Obstructive uropathy - resolved  - s/p R PCN on 6/18   - Nephrostomy tube to nephroureteral stent by IR on 6/28   - PCNL procedure by Uro completed 6/29  - Nephrostogram finished on 6/30 successfully  - Blood Cultures 6/18 (+) for carbap. resistant Pseudomonas and E. fecalis; Blood Culx 6/25: NGTD  - Urine Culx 6/18 (+) pseudomonas, proteus, ESBL, s aureus, actinotignum   Plan:   - as per ID reccs: c/w Meropenem and Polymyxin; till 7/5 (completes today)  - Urostomy removed by Urology on 07/02    # Gross Hematuria - resolved  # Bleeding from Nephrostomy tube - resolved  - CT 6/24: appropriately positioned nephrostomy tube with decreased now mild hydroureteronephrosis  - Hb Stable     # Acute hypoxic respiratory failure possibly secondary to acute seizure - resolved  - CXR 6/29: Retrated L IF central cath; No evidence of cardiopulmonary disease  - TTE: EF 62%, mild aortic stenosis  - Stable room air 7/1    # Hypokalemia   # Hypomagnesemia  - likely 2/2 Polymyxin   - replete    # Seizure disorder  # Hx of cerebral palsy/spastic paraplegia s/p PEG tube   - c/w PO Phenobarbital    # BPH: c/w tamsulosin   # Asthma:  c/w albuterol 90 microgram Q6h PRN                                                                            ----------------------------------------------------  # DVT prophylaxis : Heparin s/q  # GI prophylaxis : NA  # Diet : Dysphagia 1 pureed honey consistency  # Code status: Full  # Disposition: Group home; pending -ve covid swab and completion of antibiotic course.  65 year old male with CP currently admitted for Abx therapy of bacteremia secondary to pyelonephritis secondary to nephrolithiasis. s/p PCNL (6/29/2021). Afebrile, hemodynamically stable    # Septic shock 2/2 bacteremia from pyelonephritis + obstructive nephrolithiasis  - Resolved  # Obstructive uropathy - resolved  - s/p R PCN on 6/18   - Nephrostomy tube to nephroureteral stent by IR on 6/28   - PCNL procedure by Uro completed 6/29  - Nephrostogram finished on 6/30 successfully  - Blood Cultures 6/18 (+) for carbap. resistant Pseudomonas and E. fecalis; Blood Culx 6/25: NGTD  - Urine Culx 6/18 (+) pseudomonas, proteus, ESBL, s aureus, actinotignum   Plan:   - as per ID reccs: c/w Meropenem and Polymyxin; till 7/5 (completes today)  - Urostomy removed by Urology on 07/02    # Gross Hematuria - resolved  # Bleeding from Nephrostomy tube - resolved  - CT 6/24: appropriately positioned nephrostomy tube with decreased now mild hydroureteronephrosis  - Hb Stable     # Acute hypoxic respiratory failure possibly secondary to acute seizure - resolved  - CXR 6/29: Retrated L IF central cath; No evidence of cardiopulmonary disease  - TTE: EF 62%, mild aortic stenosis  - Stable room air since 7/1    # Hypokalemia   # Hypomagnesemia  - likely 2/2 Polymyxin   - repleted  - continue PO magnesium for 3 days    # Seizure disorder  # Hx of cerebral palsy/spastic paraplegia s/p PEG tube   - c/w PO Phenobarbital    # BPH: c/w tamsulosin   # Asthma:  c/w albuterol 90 microgram Q6h PRN                                                                            ----------------------------------------------------  # DVT prophylaxis : Heparin s/q  # GI prophylaxis : NA  # Diet : Dysphagia 1 pureed honey consistency  # Code status: Full  # Disposition: Group home; pending -ve covid swab  65 year old male with CP currently admitted for Abx therapy of bacteremia secondary to pyelonephritis secondary to nephrolithiasis. s/p PCNL (6/29/2021). Afebrile, hemodynamically stable    # Septic shock 2/2 bacteremia from pyelonephritis + obstructive nephrolithiasis  - Resolved  # Obstructive uropathy - resolved  - s/p R PCN on 6/18   - Nephrostomy tube to nephroureteral stent by IR on 6/28   - PCNL procedure by Uro completed 6/29  - Nephrostogram finished on 6/30 successfully  - Blood Cultures 6/18 (+) for carbap. resistant Pseudomonas and E. fecalis; Blood Culx 6/25: NGTD  - Urine Culx 6/18 (+) pseudomonas, proteus, ESBL, s aureus, actinotignum   Plan:   - as per ID reccs: c/w Meropenem and Polymyxin; till 7/5 (completes today)  - Urostomy removed by Urology on 07/02    # Gross Hematuria - resolved  # Bleeding from Nephrostomy tube - resolved  - CT 6/24: appropriately positioned nephrostomy tube with decreased now mild hydroureteronephrosis  - Hb Stable     # Acute hypoxic respiratory failure possibly secondary to acute seizure - resolved  - CXR 6/29: Retrated L IF central cath; No evidence of cardiopulmonary disease  - TTE: EF 62%, mild aortic stenosis  - Stable room air since 7/1    # Hypokalemia - resolved  # Hypomagnesemia  - likely 2/2 Polymyxin   - repleted      # Seizure disorder  # Hx of cerebral palsy/spastic paraplegia s/p PEG tube   - c/w PO Phenobarbital    # BPH: c/w tamsulosin   # Asthma:  c/w albuterol 90 microgram Q6h PRN                                                                            ----------------------------------------------------  # DVT prophylaxis : Heparin s/q  # GI prophylaxis : NA  # Diet : Dysphagia 1 pureed honey consistency  # Code status: Full  # Disposition: Group home; pending -ve covid swab  65 year old male with CP currently admitted for Abx therapy of bacteremia secondary to pyelonephritis secondary to nephrolithiasis. s/p PCNL (6/29/2021). Afebrile, hemodynamically stable    # Septic shock 2/2 bacteremia from pyelonephritis + obstructive nephrolithiasis  - Resolved  # Obstructive uropathy - resolved  - s/p R PCN on 6/18   - Nephrostomy tube to nephroureteral stent by IR on 6/28   - PCNL procedure by Uro completed 6/29  - Nephrostogram finished on 6/30 successfully  - Blood Cultures 6/18 (+) for carbap. resistant Pseudomonas and E. fecalis; Blood Culx 6/25: NGTD  - Urine Culx 6/18 (+) pseudomonas, proteus, ESBL, s aureus, actinotignum   Plan:   - as per ID reccs: c/w Meropenem and Polymyxin; till 7/5 (completes today)  - Urostomy removed by Urology on 07/02    # Gross Hematuria - resolved  # Bleeding from Nephrostomy tube - resolved  - CT 6/24: appropriately positioned nephrostomy tube with decreased now mild hydroureteronephrosis  - Hb Stable     # Acute hypoxic respiratory failure possibly secondary to acute seizure - resolved  - CXR 6/29: Retrated L IF central cath; No evidence of cardiopulmonary disease  - TTE: EF 62%, mild aortic stenosis  - Stable room air since 7/1    # Hypokalemia - resolved  # Hypomagnesemia  - likely 2/2 Polymyxin   - repleted    # Seizure disorder  # Hx of cerebral palsy/spastic paraplegia s/p PEG tube   - c/w PO Phenobarbital    # BPH: c/w tamsulosin   # Asthma:  c/w albuterol 90 microgram Q6h PRN                                                                            ----------------------------------------------------  # DVT prophylaxis : Heparin s/q  # GI prophylaxis : NA  # Diet : Dysphagia 1 pureed honey consistency  # Code status: Full  # Disposition: Group home; pending -ve covid swab

## 2021-07-06 NOTE — PROGRESS NOTE ADULT - NSICDXPILOT_GEN_ALL_CORE
[place]

## 2021-07-06 NOTE — PROGRESS NOTE ADULT - SUBJECTIVE AND OBJECTIVE BOX
** This is an incomplete note - pending AM rounds**   TISH SHAIKH 65y Male  MRN#: [id number]   CODE STATUS: FULL    Hospital Day: 19d    Pt is currently admitted with the primary diagnosis of Septic Shock    SUBJECTIVE  Hospital Course  65y M with PMHx of cerebral palsy, Seizure disorder, spastic paraplegia s/p PEG, BPH, Hx of Nephrolithiasis s/p pcnl x 2 (last oen in 5/20/21) with known incompetent bladder neck s/p Suprapubic Tube Placement admitted on 6/17/21 with sepsis 2/2 obstructive nephrolithiasis. CT revealed R proximal ureteral stone with moderate hydroureteronephrosis. On 6/18, pt decompensated to uroseptic shock (T 103F, BP 75/45). Emergent R Percutaneous Nephrolithotomy performed by IR on 6/18/2021, Levophed started. BP improved following procedure and LR bolus. Levophed was discontinued on 6/20, and patient has been hemodynamically stable since. Pt was weaned off High Flow to nasal canula (6/22).     As of 6/24, pt was cleared by ID for any procedures.   On 6/25, IR was contacted for conversion of nephrostomy to nephro ureteral stent, as well assessment of PEG tube which is clogged.   On 6/28, pt underwent PCN to Nephroureteral Stent on 6/28, with PCNL on 6/29. On 6/28, IR will also assess PEG tube.     On 6/30, pt did nephrostogram w/ removal.   7/2: No acute issues at this time, resting comfortably. Patient had his RIGHT PCN removed by IR 2 days ago. RIGHT urostomy bag in place. Patient was downgraded to the floor on 7/1/21 in stable condition. Planning to continue IV Abx as of   07/02 - the rt urostomy bag removed by urology.   7/3: Doing well. no complaints. Good calorie intake  07/05 - patient completed the course of antibiotics, has been hemodynamically stable and afebrile, Creatinine 0.8.       Overnight events     Subjective complaints     Present Today:   - Tubes: suprapubic catheter + (chronic)   - PEG tube +    - Type of IV Access:       .. CVC/Piccline:  No [  ], Yes [   ] : Indication:       .. Midline: No [  ], Yes [   ] : Indication:                                             ----------------------------------------------------------  OBJECTIVE  PAST MEDICAL & SURGICAL HISTORY  BPH (benign prostatic hyperplasia)    Cerebral palsy    Seizure  last seizure &gt;10 years ago    Osteoporosis    Spastic quadriplegia    Urinary calculi    Urinary retention    Asthma    S/P percutaneous endoscopic gastrostomy (PEG) tube placement    H/O cystoscopy    Suprapubic catheter                                              -----------------------------------------------------------  ALLERGIES:  No Known Allergies                                            ------------------------------------------------------------    HOME MEDICATIONS  Home Medications:  albuterol 2.5 mg/3 mL (0.083%) inhalation solution: 3 milliliter(s) inhaled every 6 hours (18 Jun 2021 01:48)  Constulose 10 g/15 mL oral liquid: 15 milliliter(s) orally once a day (05 Jul 2021 11:52)  PHENobarbital 64.8 mg oral tablet: 1 tab(s) orally once a day via G tube (18 Jun 2021 01:48)                           MEDICATIONS:  STANDING MEDICATIONS  chlorhexidine 4% Liquid 1 Application(s) Topical <User Schedule>  heparin   Injectable 5000 Unit(s) SubCutaneous every 12 hours  meropenem  IVPB 1000 milliGRAM(s) IV Intermittent every 8 hours  nystatin    Suspension 530561 Unit(s) Swish and Swallow three times a day  PHENobarbital 64.8 milliGRAM(s) Oral daily  polymyxin B IVPB 823878 Unit(s) IV Intermittent every 12 hours  tamsulosin 0.4 milliGRAM(s) Oral at bedtime    PRN MEDICATIONS  acetaminophen  Suppository .. 650 milliGRAM(s) Rectal every 6 hours PRN  ALBUTerol    90 MICROgram(s) HFA Inhaler 2 Puff(s) Inhalation every 6 hours PRN                                            ------------------------------------------------------------  VITAL SIGNS: Last 24 Hours  T(C): 36.7 (06 Jul 2021 06:19), Max: 37 (05 Jul 2021 19:47)  T(F): 98.1 (06 Jul 2021 06:19), Max: 98.6 (05 Jul 2021 19:47)  HR: 83 (06 Jul 2021 06:19) (83 - 101)  BP: 105/58 (06 Jul 2021 06:19) (93/56 - 105/58)  BP(mean): --  RR: 18 (06 Jul 2021 06:19) (18 - 19)  SpO2: --      07-05-21 @ 07:01  -  07-06-21 @ 07:00  --------------------------------------------------------  IN: 0 mL / OUT: 1125 mL / NET: -1125 mL                                             --------------------------------------------------------------  LABS:                        10.9   5.42  )-----------( 315      ( 05 Jul 2021 07:10 )             32.0     07-05    138  |  92<L>  |  11  ----------------------------<  146<H>  4.0   |  32  |  0.8    Ca    8.9      05 Jul 2021 07:10  Mg     2.1     07-05                                                                -------------------------------------------------------------  RADIOLOGY:                                            --------------------------------------------------------------    PHYSICAL EXAM:  General:   HEENT:  LUNGS:  HEART:  ABDOMEN:  EXT:  NEURO:  SKIN:                                           -------------------------------------------------------------- TISH SHAIKH 65y Male  MRN#: [id number]   CODE STATUS: FULL    Hospital Day: 19d    Pt is currently admitted with the primary diagnosis of Septic Shock    SUBJECTIVE  Hospital Course  65y M with PMHx of cerebral palsy, Seizure disorder, spastic paraplegia s/p PEG, BPH, Hx of Nephrolithiasis s/p pcnl x 2 (last oen in 5/20/21) with known incompetent bladder neck s/p Suprapubic Tube Placement admitted on 6/17/21 with sepsis 2/2 obstructive nephrolithiasis. CT revealed R proximal ureteral stone with moderate hydroureteronephrosis. On 6/18, pt decompensated to uroseptic shock (T 103F, BP 75/45). Emergent R Percutaneous Nephrolithotomy performed by IR on 6/18/2021, Levophed started. BP improved following procedure and LR bolus. Levophed was discontinued on 6/20, and patient has been hemodynamically stable since. Pt was weaned off High Flow to nasal canula (6/22).     As of 6/24, pt was cleared by ID for any procedures.   On 6/25, IR was contacted for conversion of nephrostomy to nephro ureteral stent, as well assessment of PEG tube which is clogged.   On 6/28, pt underwent PCN to Nephroureteral Stent on 6/28, with PCNL on 6/29. On 6/28, IR will also assess PEG tube.     On 6/30, pt did nephrostogram w/ removal.   7/2: No acute issues at this time, resting comfortably. Patient had his RIGHT PCN removed by IR 2 days ago. RIGHT urostomy bag in place. Patient was downgraded to the floor on 7/1/21 in stable condition. Planning to continue IV Abx as of   07/02 - the rt urostomy bag removed by urology.   7/3: Doing well. no complaints. Good calorie intake  07/05 - patient completed the course of antibiotics, has been hemodynamically stable and afebrile, Creatinine 0.8.       Overnight events   Patient was seen at the bedside this morning. He is lying comfortably on the bed. There were no acute events overnight.   He denies any chest pain, shortness of breath, cough, fever, chills, abdominal pain, nausea, vomiting, diarrhea, dizziness or headache.       Present Today:   - Tubes: suprapubic catheter + (chronic)   - PEG tube +    - Type of IV Access:       .. CVC/Piccline:  No [  ], Yes [   ] : Indication:       .. Midline: No [  ], Yes [   ] : Indication:                                             ----------------------------------------------------------  OBJECTIVE  PAST MEDICAL & SURGICAL HISTORY  BPH (benign prostatic hyperplasia)    Cerebral palsy    Seizure  last seizure &gt;10 years ago    Osteoporosis    Spastic quadriplegia    Urinary calculi    Urinary retention    Asthma    S/P percutaneous endoscopic gastrostomy (PEG) tube placement    H/O cystoscopy    Suprapubic catheter                                              -----------------------------------------------------------  ALLERGIES:  No Known Allergies                                            ------------------------------------------------------------    HOME MEDICATIONS  Home Medications:  albuterol 2.5 mg/3 mL (0.083%) inhalation solution: 3 milliliter(s) inhaled every 6 hours (18 Jun 2021 01:48)  Constulose 10 g/15 mL oral liquid: 15 milliliter(s) orally once a day (05 Jul 2021 11:52)  PHENobarbital 64.8 mg oral tablet: 1 tab(s) orally once a day via G tube (18 Jun 2021 01:48)                           MEDICATIONS:  STANDING MEDICATIONS  chlorhexidine 4% Liquid 1 Application(s) Topical <User Schedule>  heparin   Injectable 5000 Unit(s) SubCutaneous every 12 hours  meropenem  IVPB 1000 milliGRAM(s) IV Intermittent every 8 hours  nystatin    Suspension 615311 Unit(s) Swish and Swallow three times a day  PHENobarbital 64.8 milliGRAM(s) Oral daily  polymyxin B IVPB 198281 Unit(s) IV Intermittent every 12 hours  tamsulosin 0.4 milliGRAM(s) Oral at bedtime    PRN MEDICATIONS  acetaminophen  Suppository .. 650 milliGRAM(s) Rectal every 6 hours PRN  ALBUTerol    90 MICROgram(s) HFA Inhaler 2 Puff(s) Inhalation every 6 hours PRN                                            ------------------------------------------------------------  VITAL SIGNS: Last 24 Hours  T(C): 36.7 (06 Jul 2021 06:19), Max: 37 (05 Jul 2021 19:47)  T(F): 98.1 (06 Jul 2021 06:19), Max: 98.6 (05 Jul 2021 19:47)  HR: 83 (06 Jul 2021 06:19) (83 - 101)  BP: 105/58 (06 Jul 2021 06:19) (93/56 - 105/58)  BP(mean): --  RR: 18 (06 Jul 2021 06:19) (18 - 19)  SpO2: --      07-05-21 @ 07:01  -  07-06-21 @ 07:00  --------------------------------------------------------  IN: 0 mL / OUT: 1125 mL / NET: -1125 mL                                             --------------------------------------------------------------  LABS:                        10.9   5.42  )-----------( 315      ( 05 Jul 2021 07:10 )             32.0     07-05    138  |  92<L>  |  11  ----------------------------<  146<H>  4.0   |  32  |  0.8    Ca    8.9      05 Jul 2021 07:10  Mg     2.1     07-05                                                                -------------------------------------------------------------  RADIOLOGY:                                            --------------------------------------------------------------    PHYSICAL EXAM:  General: No acute distress; Pallor (-), Icterus (-), Cyanosis (-), Clubbing (-)  HEENT: Normocephalic, atraumatic, PERRLA, EOMI  PULM: Bilaterally equal and clear breath sounds, wheeze (-), rubs (-), crackles (-)  CVS: Normal S1 and S2, murmurs (-), rubs (-), gallops (-)   GI: Soft, nondistended, nontender, BS +; PEG tube in place, no drainage noted.   MSK: Edema (-), no muscle, bone or joint tenderness noted  SKIN: Warm and well perfused, no rashes noted  NEURO:  Alert and Oriented x 3; spastic paraplegia at baseline.                                          --------------------------------------------------------------

## 2021-07-06 NOTE — DISCHARGE NOTE NURSING/CASE MANAGEMENT/SOCIAL WORK - PATIENT PORTAL LINK FT
You can access the FollowMyHealth Patient Portal offered by [facility] by registering at the following website: [email or web address]. By joining "[employer]’s FollowMyHealth portal, you will also be able to view your health information using other applications (apps) compatible with our system.

## 2021-07-11 ENCOUNTER — EMERGENCY (EMERGENCY)
Facility: HOSPITAL | Age: 66
LOS: 0 days | Discharge: HOME | End: 2021-07-11
Attending: EMERGENCY MEDICINE | Admitting: EMERGENCY MEDICINE
Payer: MEDICARE

## 2021-07-11 VITALS
RESPIRATION RATE: 16 BRPM | HEIGHT: 60 IN | TEMPERATURE: 97 F | SYSTOLIC BLOOD PRESSURE: 100 MMHG | DIASTOLIC BLOOD PRESSURE: 58 MMHG | HEART RATE: 88 BPM | OXYGEN SATURATION: 94 %

## 2021-07-11 DIAGNOSIS — G40.909 EPILEPSY, UNSPECIFIED, NOT INTRACTABLE, WITHOUT STATUS EPILEPTICUS: ICD-10-CM

## 2021-07-11 DIAGNOSIS — Z98.890 OTHER SPECIFIED POSTPROCEDURAL STATES: Chronic | ICD-10-CM

## 2021-07-11 DIAGNOSIS — K94.23 GASTROSTOMY MALFUNCTION: ICD-10-CM
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
## 2021-07-11 DIAGNOSIS — N40.0 BENIGN PROSTATIC HYPERPLASIA WITHOUT LOWER URINARY TRACT SYMPTOMS: ICD-10-CM

## 2021-07-11 DIAGNOSIS — Z93.1 GASTROSTOMY STATUS: Chronic | ICD-10-CM

## 2021-07-11 DIAGNOSIS — Z87.442 PERSONAL HISTORY OF URINARY CALCULI: ICD-10-CM

## 2021-07-11 DIAGNOSIS — G82.20 PARAPLEGIA, UNSPECIFIED: ICD-10-CM

## 2021-07-11 DIAGNOSIS — Z93.59 OTHER CYSTOSTOMY STATUS: Chronic | ICD-10-CM

## 2021-07-11 DIAGNOSIS — Z79.899 OTHER LONG TERM (CURRENT) DRUG THERAPY: ICD-10-CM

## 2021-07-11 PROCEDURE — 99283 EMERGENCY DEPT VISIT LOW MDM: CPT

## 2021-07-11 PROCEDURE — 74018 RADEX ABDOMEN 1 VIEW: CPT | Mod: 26

## 2021-07-11 RX ORDER — IOHEXOL 300 MG/ML
30 INJECTION, SOLUTION INTRAVENOUS ONCE
Refills: 0 | Status: DISCONTINUED | OUTPATIENT
Start: 2021-07-11 | End: 2021-07-11

## 2021-07-11 NOTE — ED ADULT NURSE NOTE - NSIMPLEMENTINTERV_GEN_ALL_ED
Implemented All Fall with Harm Risk Interventions:  [place] to call system. Call bell, personal items and telephone within reach. Instruct patient to call for assistance. Room bathroom lighting operational. Non-slip footwear when patient is off stretcher. Physically safe environment: no spills, clutter or unnecessary equipment. Stretcher in lowest position, wheels locked, appropriate side rails in place. Provide visual cue, wrist band, yellow gown, etc. Monitor gait and stability. Monitor for mental status changes and reorient to person, place, and time. Review medications for side effects contributing to fall risk. Reinforce activity limits and safety measures with patient and family. Provide visual clues: red socks.

## 2021-07-11 NOTE — ED PROVIDER NOTE - PATIENT PORTAL LINK FT
You can access the FollowMyHealth Patient Portal offered by [facility] by registering at the following website: [email or web address]. By joining [employer]’s FollowMyHealth portal, you will also be able to view your health information using other applications (apps) compatible with our system.

## 2021-07-11 NOTE — ED PROVIDER NOTE - NSFOLLOWUPINSTRUCTIONS_ED_ALL_ED_FT
How to Care for a Feeding Tube  A feeding tube is a soft, flexible tube through which medicine, water, and liquid food can be given. A person may have a feeding tube if she or he has trouble swallowing or cannot have food or medicine by mouth.    Supplies needed to care for the tube site:  Clean gloves.  Clean washcloth, gauze pads, or soft paper towel.  Cotton swabs.  Skin barrier ointment or cream, such as petroleum jelly.  Soap and water.  Pre-cut foam pads or gauze for around the tube.  Tube tape.  Anchoring device (optional).  How to care for the tube site  Have all supplies ready and available.    Wash your hands well.    Put on clean gloves.    If there is a foam pad or gauze under the tube stabilizing disc and it is soiled or moist or has been there for more than one day, replace it.    Check the skin around the tube site for redness, a rash, swelling, drainage, or extra tissue growth. If you notice any of these, call your health care provider.    Use water and soap to moisten gauze pads and cotton swabs.    Use the moistened cotton swabs to wipe the area closest to the tube, right near the opening in the abdomen (stoma).    Use the moistened gauze pads to wipe the surrounding skin.    Rinse with water.    Use a washcloth, dry gauze pad, or soft paper towel to dry the skin and stoma site.    If the skin is red, use a cotton swab to apply a skin barrier cream or ointment in a circular motion. The cream or ointment will help the wound to heal. Do not apply antibiotic ointments at the tube site.    Apply a new pre-cut foam pad or gauze around the tube. If there is no drainage, you can leave off the foam pads or gauze.    Secure the pre-cut foam pad or gauze with tape around the edges.    Use tape or an anchoring device to fasten the feeding tube to the skin for comfort or as directed. Alternate where you put the tape to avoid damaging the skin.    Position the person in a semi-upright position, at about a 30–45 degree angle.    Throw away used supplies.    Remove your gloves.    Wash your hands.    Supplies needed to flush a feeding tube:  Clean gloves.  A clean 60 mL syringe that connects to the feeding tube.  Towel.  Sterile or purified water. Follow these guidelines:    Use sterile water if:    You have a weak immune system and have difficulty fighting off infections (are immunocompromised).  You are unsure about the amount of chemical contaminants in purified or drinking water.    Do not use fresh water found in lakes, rivers, reservoirs, or aquifers without treating or filtering first.  To purify drinking water by boiling:    Boil water for at least 1 minute. Keep a lid over the water while it boils.  Allow the water to cool to room temperature before using.      How to flush a feeding tube  Have all supplies ready and available.    Wash your hands well.    Put on clean gloves.    Draw up 30 mL of water into the syringe.    Before flushing, place the towel under the tube to catch any fluid leaks.    Kink the feeding tube while disconnecting it from the feeding-bag tubing or while removing the cap at the end of the tube. Kinking closes the tube and prevents fluid in the tube from spilling out.    Insert the tip of the syringe into the end of the feeding tube.    Release the kink.    Slowly inject the water. If you are unable to inject the water, the tip of the tube may be against the person's stomach, blocking fluid flow. To fix this problem, have the person with the feeding tube lie on his or her left side. Then, try injecting the water again. If there is resistance, do not use a lot of force to overcome it because this could cause the tube to tear.    Remove the syringe and replace the cap.    Throw away used supplies.    Remove your gloves.    Wash your hands.    Follow these instructions at home:  Caring for the tube     If there is a foam pad or gauze under the tube stabilizing disc, change it every day and when it is soiled or moist.  Do not apply antibiotic ointments at the tube site.  Flushing the tube     Do not use a syringe that is smaller than 60 mL.  To prevent medicine from clogging the tube, flush the tube at all of these times:    Before the person is given the first medicine.  Between medicines.  After the person is given the final medicine before starting a feeding.    Do not mix medicines with formula or with other medicines.  Thoroughly flush medicines through the tube so they do not mix with formula.  Contact a health care provider if:  The tube becomes blocked or clogged.  You find any of these on the skin around the tube site:    Redness.  A rash.  Swelling.  Drainage.  Extra tissue growth.    Summary  A feeding tube is a soft, flexible tube through which medicine, water, and liquid food can be given. A person may have a feeding tube if she or he has trouble swallowing or cannot have food or medicine by mouth.  Follow instructions from your health care provider about daily care and flushing of the tube.  Contact your health care provider if the tube becomes blocked or clogged, or if you notice swelling, drainage, or changes in skin around the tube site.  This information is not intended to replace advice given to you by your health care provider. Make sure you discuss any questions you have with your health care provider.

## 2021-07-11 NOTE — ED PROVIDER NOTE - PHYSICAL EXAMINATION
chronically-ill appearing middle-aged m, nad  skin warm, dry  ncat  neck supple  rrr nl s1s2 no mrg  ctab no wrr  abd soft ntnd, g-tube in place, site c/d, no drainage or erythema, no palpable masses no rgr  back non-tender no cvat  ext no cce dpi  neuro aaox3 baseline

## 2021-07-11 NOTE — ED PROVIDER NOTE - ATTENDING CONTRIBUTION TO CARE
65M from group home ext pmh incl cp w/paraplegia, seizures, bph, kidney stones complic by sepsis w/recent nephrostomy d/c to home 7/5, p/w obstructed g-tube. Per pt & group home staff @ bedside unable to flush. No other complaints. No nv, abd pain, decr uo. PCP Brigido.    PE:  chronically-ill appearing middle-aged m, nad  skin warm, dry  ncat  neck supple  rrr nl s1s2 no mrg  ctab no wrr  abd soft ntnd, g-tube in place, site c/d, no drainage or erythema, no palpable masses no rgr  back non-tender no cvat  ext no cce dpi  neuro aaox3 baseline

## 2021-07-11 NOTE — ED PROVIDER NOTE - OBJECTIVE STATEMENT
65M from group home ext pmh incl cp w/paraplegia, seizures, bph, kidney stones complic by sepsis w/recent nephrostomy d/c to home 7/5, p/w obstructed g-tube. Per pt & group home staff @ bedside unable to flush. No other complaints. No nv, abd pain, decr uo. PCP Brigido.

## 2021-07-12 ENCOUNTER — APPOINTMENT (OUTPATIENT)
Dept: UROLOGY | Facility: CLINIC | Age: 66
End: 2021-07-12
Payer: MEDICARE

## 2021-07-12 VITALS — SYSTOLIC BLOOD PRESSURE: 90 MMHG | DIASTOLIC BLOOD PRESSURE: 55 MMHG | HEART RATE: 75 BPM | TEMPERATURE: 98 F

## 2021-07-12 DIAGNOSIS — Z87.442 PERSONAL HISTORY OF URINARY CALCULI: ICD-10-CM

## 2021-07-12 DIAGNOSIS — T83.9XXA UNSPECIFIED COMPLICATION OF GENITOURINARY PROSTHETIC DEVICE, IMPLANT AND GRAFT, INITIAL ENCOUNTER: ICD-10-CM

## 2021-07-12 PROCEDURE — 99024 POSTOP FOLLOW-UP VISIT: CPT

## 2021-07-14 DIAGNOSIS — M81.0 AGE-RELATED OSTEOPOROSIS WITHOUT CURRENT PATHOLOGICAL FRACTURE: ICD-10-CM

## 2021-07-14 DIAGNOSIS — K59.00 CONSTIPATION, UNSPECIFIED: ICD-10-CM

## 2021-07-14 DIAGNOSIS — J98.11 ATELECTASIS: ICD-10-CM

## 2021-07-14 DIAGNOSIS — N20.0 CALCULUS OF KIDNEY: ICD-10-CM

## 2021-07-14 DIAGNOSIS — K94.23 GASTROSTOMY MALFUNCTION: ICD-10-CM

## 2021-07-14 DIAGNOSIS — N31.9 NEUROMUSCULAR DYSFUNCTION OF BLADDER, UNSPECIFIED: ICD-10-CM

## 2021-07-14 DIAGNOSIS — Z87.442 PERSONAL HISTORY OF URINARY CALCULI: ICD-10-CM

## 2021-07-14 DIAGNOSIS — J96.01 ACUTE RESPIRATORY FAILURE WITH HYPOXIA: ICD-10-CM

## 2021-07-14 DIAGNOSIS — N40.0 BENIGN PROSTATIC HYPERPLASIA WITHOUT LOWER URINARY TRACT SYMPTOMS: ICD-10-CM

## 2021-07-14 DIAGNOSIS — Z93.51 CUTANEOUS-VESICOSTOMY STATUS: ICD-10-CM

## 2021-07-14 DIAGNOSIS — E87.2 ACIDOSIS: ICD-10-CM

## 2021-07-14 DIAGNOSIS — J45.909 UNSPECIFIED ASTHMA, UNCOMPLICATED: ICD-10-CM

## 2021-07-14 DIAGNOSIS — K91.30 POSTPROCEDURAL INTESTINAL OBSTRUCTION, UNSPECIFIED AS TO PARTIAL VERSUS COMPLETE: ICD-10-CM

## 2021-07-14 DIAGNOSIS — N17.9 ACUTE KIDNEY FAILURE, UNSPECIFIED: ICD-10-CM

## 2021-07-14 DIAGNOSIS — G80.0 SPASTIC QUADRIPLEGIC CEREBRAL PALSY: ICD-10-CM

## 2021-07-14 DIAGNOSIS — E87.70 FLUID OVERLOAD, UNSPECIFIED: ICD-10-CM

## 2021-07-14 DIAGNOSIS — A41.52 SEPSIS DUE TO PSEUDOMONAS: ICD-10-CM

## 2021-07-14 DIAGNOSIS — R65.21 SEVERE SEPSIS WITH SEPTIC SHOCK: ICD-10-CM

## 2021-07-14 DIAGNOSIS — Q90.9 DOWN SYNDROME, UNSPECIFIED: ICD-10-CM

## 2021-07-14 DIAGNOSIS — G40.909 EPILEPSY, UNSPECIFIED, NOT INTRACTABLE, WITHOUT STATUS EPILEPTICUS: ICD-10-CM

## 2021-07-14 DIAGNOSIS — N13.6 PYONEPHROSIS: ICD-10-CM

## 2021-07-14 DIAGNOSIS — Z20.822 CONTACT WITH AND (SUSPECTED) EXPOSURE TO COVID-19: ICD-10-CM

## 2021-07-14 DIAGNOSIS — I21.A1 MYOCARDIAL INFARCTION TYPE 2: ICD-10-CM

## 2021-07-25 NOTE — ED PROVIDER NOTE - CLINICAL SUMMARY MEDICAL DECISION MAKING FREE TEXT BOX
DISPLAY PLAN FREE TEXT
Labs without LANA, patient seen by , agree with plan for IR in AM. Resting comfortably, admitted to medicine for occluded SPT

## 2021-07-26 NOTE — SWALLOW BEDSIDE ASSESSMENT ADULT - DIET PRIOR TO ADMI
SUBJECTIVE:   The patient is a 56 year old female coming today for discussion regarding her acute and chronic medical issues.  She does have type 2 diabetes and is compliant with her medication.  She is due for her labs.    Patient also notes that she has had a lot of edema of her lower legs.  She denies orthopnea or dyspnea.  She states that she has been very fatigued however.  She has a history of vitamin-D deficiency as well.  She is taking amlodipine for her hypertension    Patient also is morbidly obese.  She had lost weight with phentermine and would like to restart the medication.      MEDICATIONS: Reviewed in Epic.     ALLERGIES: Reviewed in Epic.    OBJECTIVE:  Visit Vitals  /70   Pulse 96   Ht 5' 10\" (1.778 m)   Wt 127.7 kg   SpO2 98%   BMI 40.39 kg/m²     Cardiovascular:  Normal rate, normal rhythm. No murmurs, no gallops, no rubs.  Respiratory:  No respiratory distress. Normal breath sounds. No rales. No wheezing.  She does have 1+ pitting edema of her lower extremities bilaterally.    ASSESSMENT:   1. Type 2 diabetes mellitus without complication, without long-term current use of insulin (CMS/[facility])    2. Edema of both legs    3. Vitamin D deficiency    4. Class 3 severe obesity due to excess calories with serious comorbidity and body mass index (BMI) of 40.0 to 44.9 in adult (CMS/HCC)         PLAN:  Will check the labs listed below.  She will stop amlodipine for now.  We will restart her phentermine at a half dose.  See me in 2 weeks.  She will take her hydrochlorothiazide for her blood pressure.  Orders Placed This Encounter   • Glycohemoglobin   • NT proBNP   • Thyroid Stimulating Hormone   • Vitamin D -25 Hydroxy   • phentermine (ADIPEX-P) 37.5 MG tablet           
puree w/ honey thick liquids, per RN at group home
puree w/ honey thick liquids, per RN at group home
puree, nectar as per pt's report

## 2021-08-09 NOTE — ED PROVIDER NOTE - PROGRESS NOTE DETAILS
Last Visit Date: 5/7/2021   Next Visit Date: 8/13/21 with Carmenza Lazo attempted disimpaction. patient with no rectal tone even when coached to bear down. copious amount of very soft brown stool in rectal vault.

## 2021-08-17 NOTE — DIETITIAN INITIAL EVALUATION ADULT. - OTHER CALCULATIONS
wt used: dosing 57.4 kg; Kcal: 2549-8555 kcal/d (25-30 kcal/kg); Protein: 57-69 g (1-1.2 g/kg); Fluid: per VENT team 76 y/o male with h/o Dyslipidemia & BPH presents with a 1 year h/o a lung nodule. A CT scan of lung revealed that that nodule had increased in size so he underwent a robotic assisted left VATS, left upper lobe wedge resection, completion left upper lobectomy & mediastinal lymph node dissection on 08/11/2021.  Post-op urinary retention led to the placement of a Basilio and a persistent air leak led to the pleurevac being put to a mini[facility] for discharge home

## 2021-08-23 NOTE — ED PROVIDER NOTE - CARE PLAN
Principal Discharge DX:	Urinary obstruction  Secondary Diagnosis:	Edouard catheter problem, initial encounter Previous Accession (Optional): [id number] Previous Accession (Optional): [id number]

## 2021-08-24 ENCOUNTER — APPOINTMENT (OUTPATIENT)
Dept: UROLOGY | Facility: CLINIC | Age: 66
End: 2021-08-24
Payer: MEDICARE

## 2021-08-24 VITALS — WEIGHT: 155 LBS | BODY MASS INDEX: 25.83 KG/M2 | HEIGHT: 65 IN

## 2021-08-24 PROCEDURE — 51710 CHANGE OF BLADDER TUBE: CPT | Mod: 58

## 2021-09-02 NOTE — ED PROVIDER NOTE - DISPOSITION TYPE
ADMIT Taltz Counseling: I discussed with the patient the risks of ixekizumab including but not limited to immunosuppression, serious infections, worsening of inflammatory bowel disease and drug reactions.  The patient understands that monitoring is required including a PPD at baseline and must alert us or the primary physician if symptoms of infection or other concerning signs are noted.

## 2021-09-15 ENCOUNTER — EMERGENCY (EMERGENCY)
Facility: HOSPITAL | Age: 66
LOS: 0 days | Discharge: HOME | End: 2021-09-16
Attending: EMERGENCY MEDICINE | Admitting: EMERGENCY MEDICINE
Payer: MEDICARE

## 2021-09-15 VITALS
DIASTOLIC BLOOD PRESSURE: 69 MMHG | TEMPERATURE: 99 F | RESPIRATION RATE: 17 BRPM | SYSTOLIC BLOOD PRESSURE: 120 MMHG | OXYGEN SATURATION: 93 % | HEIGHT: 60 IN | HEART RATE: 85 BPM

## 2021-09-15 DIAGNOSIS — Z93.1 GASTROSTOMY STATUS: Chronic | ICD-10-CM

## 2021-09-15 DIAGNOSIS — Z87.39 PERSONAL HISTORY OF OTHER DISEASES OF THE MUSCULOSKELETAL SYSTEM AND CONNECTIVE TISSUE: ICD-10-CM

## 2021-09-15 DIAGNOSIS — Z98.890 OTHER SPECIFIED POSTPROCEDURAL STATES: Chronic | ICD-10-CM

## 2021-09-15 DIAGNOSIS — J45.909 UNSPECIFIED ASTHMA, UNCOMPLICATED: ICD-10-CM

## 2021-09-15 DIAGNOSIS — Z86.69 PERSONAL HISTORY OF OTHER DISEASES OF THE NERVOUS SYSTEM AND SENSE ORGANS: ICD-10-CM
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
## 2021-09-15 DIAGNOSIS — N40.0 BENIGN PROSTATIC HYPERPLASIA WITHOUT LOWER URINARY TRACT SYMPTOMS: ICD-10-CM

## 2021-09-15 DIAGNOSIS — T83.098A OTHER MECHANICAL COMPLICATION OF OTHER URINARY CATHETER, INITIAL ENCOUNTER: ICD-10-CM

## 2021-09-15 DIAGNOSIS — Z93.1 GASTROSTOMY STATUS: ICD-10-CM

## 2021-09-15 DIAGNOSIS — Z86.73 PERSONAL HISTORY OF TRANSIENT ISCHEMIC ATTACK (TIA), AND CEREBRAL INFARCTION WITHOUT RESIDUAL DEFICITS: ICD-10-CM

## 2021-09-15 DIAGNOSIS — Z93.59 OTHER CYSTOSTOMY STATUS: Chronic | ICD-10-CM
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
## 2021-09-15 PROCEDURE — 99283 EMERGENCY DEPT VISIT LOW MDM: CPT

## 2021-09-15 NOTE — ED ADULT NURSE NOTE - NS ED NURSE LEVEL OF CONSCIOUSNESS SPEECH
EXAM DESCRIPTION:

Soft Tissue,Extremity



CLINICAL HISTORY:

45 years Male, cellulitis of right upper arm



COMPARISON:

None.



FINDINGS:

Ultrasound of the subcutaneous soft tissues proximal right

forearm was performed. Interstitial edema is noted in the

subcutaneous soft tissues at the area of concern, but there is no

fluid collection or other soft tissue mass.



IMPRESSION:

Subcutaneous soft tissue interstitial edema at the area of

concern without abscess, mass or other focal soft tissue lesion.

Differential considerations include cellulitis or contusion.



Electronically signed by:  Salvador Clark MD  1/11/2019 1:25 PM [facility] [[email or web address]]

Workstation: [id number] Speaking Coherently

## 2021-09-15 NOTE — ED ADULT NURSE NOTE - OBJECTIVE STATEMENT
65 year old male complaining that suprapubic catheter fell out while getting ready for bed. Scant blood noted in diaper. Pt denies pulling the catheter. Pt denies pain or discomfort.

## 2021-09-16 NOTE — ED PROVIDER NOTE - CARE PROVIDER_API CALL
Solis Joyner)  Urology  [address] [place] [address], [address]  [place] [address]  Phone: [phone]  Fax: [phone]  Follow Up Time:

## 2021-09-16 NOTE — ED ADULT NURSE REASSESSMENT NOTE - NS ED NURSE REASSESS COMMENT FT1
16 FR alatorre inserted for suprapubic catherization by MD alvarez and ABHILASH vela. Sterile technique witnessed and maintained. No distress noted. Will continue to monitor. 16 FR alatorre inserted for suprapubic catherization by MD alvarez and ABHILASH vela. Sterile technique witnessed and maintained. Pt denies pain or discomfort. No distress noted. Will continue to monitor.

## 2021-09-16 NOTE — ED PROVIDER NOTE - NSFOLLOWUPINSTRUCTIONS_ED_ALL_ED_FT
Make an appointment with the urologist within 1-3 days of your ER visit.    Hematuria    WHAT YOU NEED TO KNOW:    Hematuria is blood in your urine. Your urine may be bright red to dark brown.     DISCHARGE INSTRUCTIONS:    Return to the emergency department if:     You have blood in your urine after a new injury, such as a fall.       You are urinating very small amounts or not at all.      You feel like you cannot empty your bladder.      You have severe back or side pain that does not go away with treatment.     Contact your healthcare provider if:     You have a fever that gets worse or does not go away with treatment.       You cannot keep liquids or medicines down.      Your urine gets darker, even after you drink extra liquids.      You have questions or concerns about your condition, treatment, or care.    Drink liquids as directed: You may need to drink extra liquids to help flush the blood from your body through your urine. Water is the best liquid to drink. Ask how much liquid to drink each day and which liquids are best for you.     Follow up with your healthcare provider as directed: Write down your questions so you remember to ask them during your visits.        © Copyright [employer] 2019 All illustrations and images included in CareNotes are the copyrighted property of [employer] or [employer].

## 2021-09-16 NOTE — ED PROVIDER NOTE - PATIENT PORTAL LINK FT
You can access the FollowMyHealth Patient Portal offered by [facility] by registering at the following website: [email or web address]. By joining [employer]’s FollowMyHealth portal, you will also be able to view your health information using other applications (apps) compatible with our system.

## 2021-09-16 NOTE — ED PROVIDER NOTE - OBJECTIVE STATEMENT
65 y.o. pmh BPH, CP of presenting for suprapubic alatorre catheter replacement. As per aid accompanying pt, suprapubic catheter was removed. Pt is still making urine. No pain in abdomen. No fever or chills.

## 2021-09-16 NOTE — ED PROVIDER NOTE - PHYSICAL EXAMINATION
Physical Exam    Vital Signs: I have reviewed the initial vital signs.  Constitutional: well-nourished, appears stated age, no acute distress  Eyes: Conjunctiva pink, Sclera clear  Cardiovascular: S1 and S2, regular rate  Respiratory: unlabored respiratory effort, speaking in full sentences, handling oral secretions  Gastrointestinal: soft, non-tender abdomen, no pulsatile mass, normal bowl sounds, suprapubic alatorre catheter ostomy patent without  bleeding

## 2021-09-16 NOTE — ED PROVIDER NOTE - NS ED ROS FT
Constitutional: (-) fever (-) chills (-) (-) lightheadedness   Gastrointestinal: (-) vomiting, (-) diarrhea (-) abdominal pain (-) nausea (-) anorexia  Musculoskeletal: (-) neck pain, (-) back pain, (-) joint pain (-) joint swelling (-) painful ROM  Integumentary: (-) rash, (-) edema (-) lacerations (-) pruritis \  gu : (-) dysuria (-) hematuria

## 2021-09-28 ENCOUNTER — APPOINTMENT (OUTPATIENT)
Dept: UROLOGY | Facility: CLINIC | Age: 66
End: 2021-09-28
Payer: MEDICARE

## 2021-09-28 VITALS — BODY MASS INDEX: 25.83 KG/M2 | HEIGHT: 65 IN | WEIGHT: 155 LBS

## 2021-09-28 PROBLEM — T83.9XXA: Status: ACTIVE | Noted: 2019-09-16

## 2021-09-28 PROCEDURE — 51710 CHANGE OF BLADDER TUBE: CPT

## 2021-10-01 NOTE — ASSESSMENT
[FreeTextEntry1] : He is status post right PCNL doing well post procedure.  Will continue follow-up with nephrology and follow-up with Dr. Cooley as scheduled for catheter changes and stone monitoring.

## 2021-10-01 NOTE — LETTER HEADER
[FreeTextEntry3] : Bonnie Cooper M.D.\par Director of Urology\par [facility]\par [address] [place] [address], [address]\[place] [address]

## 2021-10-01 NOTE — HISTORY OF PRESENT ILLNESS
[Urinary Incontinence] : urinary incontinence [Urinary Frequency] : urinary frequency [Nocturia] : nocturia [FreeTextEntry1] :  65-year-old male, who had been following for nephrolithiasis status post right PCNL on 6/29/2021.  He underwent nephroureteral stent removal on 6/30/2021 with ostomy bag removal the next day.\par \par He is doing well post procedure denies any complications.\par \par He is being followed by nephrology for his nephrolithiasis which recommend continue potassium citrate and metabolic work-ups.  He scheduled to follow-up in November.\par \par

## 2021-10-01 NOTE — REVIEW OF SYSTEMS
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

## 2021-10-16 NOTE — DISCHARGE NOTE PROVIDER - NSDCFUSCHEDAPPT_GEN_ALL_CORE_FT
TISH SHAIKH ; 06/03/2020 ; [facility] TISH SHAIKH ; 06/03/2020 ; [facility] TISH SHAIKH ; 06/03/2020 ; [facility] TISH SHAIKH ; 06/03/2020 ; [facility] TISH SHAIKH ; 07/02/2020 ; [facility] TISH SHAIKH ; 07/02/2020 ; [facility] TISH SHAIKH ; 07/02/2020 ; [facility] TISH SHAIKH ; 07/02/2020 ; [facility]

## 2021-10-26 ENCOUNTER — APPOINTMENT (OUTPATIENT)
Dept: UROLOGY | Facility: CLINIC | Age: 66
End: 2021-10-26
Payer: MEDICARE

## 2021-10-26 PROCEDURE — 51710 CHANGE OF BLADDER TUBE: CPT

## 2021-11-23 ENCOUNTER — NON-APPOINTMENT (OUTPATIENT)
Age: 66
End: 2021-11-23

## 2021-11-23 ENCOUNTER — APPOINTMENT (OUTPATIENT)
Dept: NEPHROLOGY | Facility: CLINIC | Age: 66
End: 2021-11-23
Payer: MEDICARE

## 2021-11-23 ENCOUNTER — OUTPATIENT (OUTPATIENT)
Dept: OUTPATIENT SERVICES | Facility: HOSPITAL | Age: 66
LOS: 1 days | Discharge: HOME | End: 2021-11-23

## 2021-11-23 VITALS
HEART RATE: 71 BPM | SYSTOLIC BLOOD PRESSURE: 156 MMHG | DIASTOLIC BLOOD PRESSURE: 104 MMHG | WEIGHT: 120 LBS | BODY MASS INDEX: 19.99 KG/M2 | OXYGEN SATURATION: 96 % | HEIGHT: 65 IN

## 2021-11-23 DIAGNOSIS — Z93.59 OTHER CYSTOSTOMY STATUS: Chronic | ICD-10-CM

## 2021-11-23 DIAGNOSIS — Z93.1 GASTROSTOMY STATUS: Chronic | ICD-10-CM

## 2021-11-23 DIAGNOSIS — Z98.890 OTHER SPECIFIED POSTPROCEDURAL STATES: Chronic | ICD-10-CM

## 2021-11-23 PROCEDURE — 99213 OFFICE O/P EST LOW 20 MIN: CPT | Mod: GC

## 2021-11-23 NOTE — END OF VISIT
[] : Resident [FreeTextEntry3] : PAtient seen and examined with renal resident \par Agree with note A and plan \par will need to increase free water via PEG to 500 cc q5h / hold night/ midnight \par will increase K citrate dose \par rtc in 6 months

## 2021-11-23 NOTE — ASSESSMENT
[FreeTextEntry1] : 62 y/o M w/ PMhx of CP (wheelchair bound), s/p PEG, s/p suprapubic catheter d/t urethral strictures here for follow up for nephrolithiasis.He recently had left side PCNL on 6/29/21. As per staff, he is scheduled for follow up with urology tomorrow. \par  \par #nephrolithiasis\par - CMP 11/2021 noted.\par - 11/2019 CTAP: no hydronephrosis, punctate nonobstructing R upper pole calculus\par - recently had left side PCNL on 6/29/21\par - 24 hr urine noted for volume 1400 ml and hyperoxaluria\par - increase potassium citrate solution to 20 ml twice daily\par - scheduled for urology f/up, as per staff\par - increase free water, free water 500 cc q 4 hr via PEG tube starting 6 AM but hold 2 AM Free water intake via PEG tube daily.\par - Avoid diet with high oxalate e.g. leafy vegetables.\par - repeat w/up ordered.\par - f/u in 6 months

## 2021-11-23 NOTE — HISTORY OF PRESENT ILLNESS
[FreeTextEntry1] : He is accompanied by staff Ms. Macdonald.\par 65 y/o M w/ PMhx of CP (wheelchair bound), s/p PEG, s/p suprapubic catheter d/t urethral strictures here for follow up for nephrolithiasis. He recently had PCNL on 6/29/21. As per staff, he is scheduled for follow up with urology tomorrow.\par Overall patient feels well, denies any back pain, hematuria, chest pain, muscle pain, weakness, urinary urgency, dysuria.\par

## 2021-11-24 ENCOUNTER — APPOINTMENT (OUTPATIENT)
Dept: UROLOGY | Facility: CLINIC | Age: 66
End: 2021-11-24
Payer: MEDICARE

## 2021-11-24 VITALS — HEIGHT: 65 IN | BODY MASS INDEX: 19.99 KG/M2 | WEIGHT: 120 LBS

## 2021-11-24 DIAGNOSIS — N20.0 CALCULUS OF KIDNEY: ICD-10-CM

## 2021-11-24 PROCEDURE — 51710 CHANGE OF BLADDER TUBE: CPT

## 2021-12-03 NOTE — H&P PST ADULT - VASCULAR
Neurosurgery Follow up Note    Date:12/3/2021         Patient Kaylen Perez     YOB: 1945     Age:76 y.o. Reason for Follow up: Follow up for 18 month MRI of the brain with and without contrast      Chief Complaint:   Chief Complaint   Patient presents with    Follow-up     1 1/2 year MRI follow up         Subjective    Alicia Nguyen is a 68year old female who presents to the office today for a 18 month MRI follow up. She undergone surgery on 7-3-19 or a suboccipital craniectomy for resection of cerebral lesion performed by Dr. Yoselyn Mendez. She denies, numbness, tingling, visual changes, or gait disturbances. She stated that she has not been sleeping well as she has been thinking about a young girl who passed and her boyfriend that is in critical condition after a care accident that her bible study group is praying for. She stated that other than this she has no complaints. Review of Systems   Review of Systems   Constitutional: Negative for activity change, fatigue and fever. Respiratory: Negative for cough and shortness of breath. Musculoskeletal: Negative for gait problem. Skin: Negative for color change. Neurological: Negative for dizziness, weakness and numbness. Psychiatric/Behavioral: Positive for sleep disturbance. Negative for behavioral problems. The patient is not nervous/anxious. Medications     Current Outpatient Medications on File Prior to Visit   Medication Sig Dispense Refill    polyethylene glycol (GLYCOLAX) 17 GM/SCOOP powder take 17GM (DISSOLVED IN WATER) by mouth once daily 510 g 1    lisinopril-hydroCHLOROthiazide (PRINZIDE;ZESTORETIC) 20-25 MG per tablet Take 1 tab by mouth daily .  90 tablet 1    oxybutynin (DITROPAN) 5 MG tablet take 1 tablet by mouth twice a day 180 tablet 1    amLODIPine (NORVASC) 10 MG tablet Take 1 tablet by mouth daily 90 tablet 1    potassium chloride (KLOR-CON M) 20 MEQ extended release tablet take 1 tablet by mouth once daily 90 tablet 1    fluticasone (FLONASE) 50 MCG/ACT nasal spray instill 2 sprays into each nostril once daily 16 g 3    pravastatin (PRAVACHOL) 40 MG tablet take 1 tablet by mouth every evening take 1 tablet by mouth at bedtime 90 tablet 1    letrozole (FEMARA) 2.5 MG tablet Take 2.5 mg by mouth daily      Cranberry-Vitamin C (AZO CRANBERRY URINARY TRACT) 250-60 MG CAPS Take as directed on package. 30 capsule 0    famotidine (PEPCID AC) 10 MG tablet Take 10 mg by mouth as needed      diphenhydrAMINE-APAP, sleep, (TYLENOL PM EXTRA STRENGTH)  MG tablet Take 1 tablet by mouth nightly as needed for Sleep      Biotin 1000 MCG TABS Take by mouth daily      Artificial Tear Ointment (DRY EYES OP) Apply to eye as needed      Calcium Carb-Cholecalciferol (CALCIUM 500+D3) 500-400 MG-UNIT TABS Take 1 tablet by mouth 2 times daily       Cholecalciferol (VITAMIN D3) 2000 units CAPS Take 1 capsule by mouth daily       aspirin 81 MG tablet Take 81 mg by mouth daily        No current facility-administered medications on file prior to visit. Past History    Past Medical History:   has a past medical history of Abnormal EKG, Breast cancer ([address] Cancer ([address] Complication of anesthesia, Constipation, Hyperglycemia, Hyperlipidemia, Hypertension, Melanoma in situ ([address] Melanoma in situ of lower extremity ([address] Metabolic syndrome, and OA (osteoarthritis). Social History:   reports that she has never smoked. She has never used smokeless tobacco. She reports current alcohol use. She reports that she does not use drugs. Family History:   Family History   Problem Relation Age of Onset    Heart Failure Mother    [facility] Cancer Father         lung       Physical Examination      Vitals:  /73 (Site: Right Upper Arm, Position: Sitting, Cuff Size: Large Adult)   Pulse 78   Ht 5' 5\" (1.651 m)   Wt 168 lb (76.2 kg)   BMI 27.96 kg/m²       Physical Exam  Constitutional:       Appearance: Normal appearance. HENT:      Nose: No congestion or rhinorrhea. Cardiovascular:      Rate and Rhythm: Normal rate. Pulses: Normal pulses. Pulmonary:      Effort: Pulmonary effort is normal.   Abdominal:      General: Abdomen is flat. Palpations: Abdomen is soft. Musculoskeletal:         General: No swelling or tenderness. Skin:     General: Skin is warm and dry. Neurological:      General: No focal deficit present. Mental Status: She is alert and oriented to person, place, and time. Sensory: No sensory deficit. Motor: No weakness. Gait: Gait normal.   Psychiatric:         Mood and Affect: Mood normal.         Behavior: Behavior normal.         Thought Content: Thought content normal.         Judgment: Judgment normal.       Neurologic Exam     Mental Status   Oriented to person, place, and time. Imaging   Imaging last 30 days:  MRI BRAIN W WO CONTRAST    Result Date: 11/29/2021   1. Postoperative changes in the posterior fossa with no evidence of recurrent mass. 2. Mild atrophy and probable ischemic changes in the white matter. 3. No evidence for an acute infarct. 4. Small retention cyst or polyp in the right maxillary sinus. . **This report has been created using voice recognition software. It may contain minor errors which are inherent in voice recognition technology. ** Final report electronically signed by DR Zahida Wilson on 11/29/2021 10:42 AM         Assessment and Plan:          1. 18 month MRI follow up for post craniotomy for resection of cerebral lesion  2. MRI brain without contrast in 2 years and follow up after MRI completed  3. Follow up in 2 year(s). 4. Sleep hygiene discussed information handout given  5. Continue taking Melatonin as needed at night for sleep  4. All patient questions answered. Pt voiced understanding.  Patient            instructed to call the office with any questions or concerns    Electronically signed by NARAYAN Taylor CNP on 12/3/21 at 8:35 AM EST Equal and normal pulses (carotid, femoral, dorsalis pedis)

## 2022-01-12 ENCOUNTER — APPOINTMENT (OUTPATIENT)
Dept: UROLOGY | Facility: CLINIC | Age: 67
End: 2022-01-12
Payer: MEDICARE

## 2022-01-12 VITALS — BODY MASS INDEX: 19.99 KG/M2 | HEIGHT: 65 IN | WEIGHT: 120 LBS

## 2022-01-12 PROCEDURE — 51710 CHANGE OF BLADDER TUBE: CPT

## 2022-01-25 NOTE — INPATIENT CERTIFICATION FOR MEDICARE PATIENTS - THE STATUS OF COMORBIDITIES.
"Subjective     Alie Lowe is a 36 y.o. female who presents with Hand Pain (right hand )      DOI 1/23/22; 1st visit.  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  It was approximately 30 pounds and fell down, crushing her left middle fingertip between the filter and a metal bar.  She noted immediate pain and throbbing. She had an acrylic nail that was partially broken so she removed the remainder.  She notes persistent pain, rated 4/10.  She is taking 800 mg ibuprofen with some relief.  No prior history of similar injury or baseline limitation. She is right hand dominant.  She is off work for the next 5 days due to unrelated URI, scheduled to return to work 1/30/22.     HPI    Review of Systems   Constitutional: Negative for chills, fever and malaise/fatigue.     Medications, Allergies, and current problem list reviewed today in Epic         Objective     Blood Pressure 110/72   Pulse 80   Temperature 36.4 °C (97.6 °F)   Respiration 12   Height 1.651 m (5' 5\")   Weight 112 kg (246 lb 6.4 oz)   Oxygen Saturation 100%   Body Mass Index 41.00 kg/m²      Physical Exam    Alie is alert, oriented, and in no acute distress.  Blood pressure 110/72.  Heart rate 80.  Afebrile.  Left middle finger demonstrates broken nail at the distal aspect of the nail with no significant injury to the nail bed.  No subungual hematoma.  1+ generalized fingertip swelling with TTP.  NV, sensation, and strength grossly intact. ROM grossly intact but painful DIP flexion noted.  Cap refill < 2 seconds.  No bleeding, erythema, or purulence.          DX-HAND 3+ LEFT  Order: [id number]   Status: Final result     Visible to patient: No (scheduled for 1/26/2022  1:35 PM)     Next appt: 02/04/2022 at 09:30 AM in [facility] (Storm Miles M.D.)     Dx: Crushing injury of left middle finger...     0 Result Notes    Details    Reading Physician Reading Date Result Priority   Letha Marques M.D.  [phone] 1/25/2022 Urgent " 2. The status of comorbities. (See ED/admit documents) Care     Narrative & Impression     1/25/2022 3:18 PM     HISTORY/REASON FOR EXAM:  Crush injury distal left 3rd digit with pain.        TECHNIQUE/EXAM DESCRIPTION AND NUMBER OF VIEWS:  3 views of the LEFT hand.     COMPARISON: None     FINDINGS:     There is no focal soft tissue swelling.     There is no evidence for displaced fracture or dislocation.     There is probable minimal early degenerative change of the left 1st CMC joint.        IMPRESSION:     1.  There is no fracture of the left hand.             Exam Ended: 01/25/22  3:28 PM Last Resulted: 01/25/22  3:32 PM                      Assessment & Plan        1. Crushing injury of left middle finger, initial encounter  - DX-HAND 3+ LEFT; Future    Discussed exam findings with Alie. Differential reviewed.  Likely self-limited.  OTC analgesics prn pain.  Hand elevation and ice compress prn.  No work restrictions.  Follow up in 1 week, sooner if worse.  She verbalized understanding of and agreed with plan of care.

## 2022-02-15 NOTE — ED ADULT TRIAGE NOTE - NS ED NURSE DIRECT TO ROOM YN
Problem: Adult Inpatient Plan of Care  Goal: Plan of Care Review  Outcome: Ongoing, Progressing  Goal: Patient-Specific Goal (Individualized)  Outcome: Ongoing, Progressing  Goal: Absence of Hospital-Acquired Illness or Injury  Outcome: Ongoing, Progressing  Goal: Optimal Comfort and Wellbeing  Outcome: Ongoing, Progressing  Goal: Readiness for Transition of Care  Outcome: Ongoing, Progressing     Problem: Fluid and Electrolyte Imbalance (Acute Kidney Injury/Impairment)  Goal: Fluid and Electrolyte Balance  Outcome: Ongoing, Progressing     Problem: Oral Intake Inadequate (Acute Kidney Injury/Impairment)  Goal: Optimal Nutrition Intake  Outcome: Ongoing, Progressing     Problem: Renal Function Impairment (Acute Kidney Injury/Impairment)  Goal: Effective Renal Function  Outcome: Ongoing, Progressing     Problem: Diabetes Comorbidity  Goal: Blood Glucose Level Within Targeted Range  Outcome: Ongoing, Progressing     Problem: Impaired Wound Healing  Goal: Optimal Wound Healing  Outcome: Ongoing, Progressing      No

## 2022-03-02 ENCOUNTER — APPOINTMENT (OUTPATIENT)
Dept: UROLOGY | Facility: CLINIC | Age: 67
End: 2022-03-02

## 2022-03-09 NOTE — PROGRESS NOTE ADULT - SUBJECTIVE AND OBJECTIVE BOX
SUBJECTIVE:    Patient is a 63y old Male who presents with a chief complaint of suprapubic pain (18 Jun 2019 10:56)      HPI:  64 yo M with PMH of cerebral palsy from group home, seizure disorder, spastic paraplegia, s/p PEG tube, BPH, bladder neck stricture s/p suprapubic cath, asthma, nephrolithiasis, chronic constipation was sent in from group home for suprapubic pain , also endorses 2 episodes of vomiting , pain was more lateralized to the R side . Denies any associated fever or chills , but notes that the catheter drainage might have been a little bloody . Patient is known to have nephrolithiasis , and recurrent UTI , S/P suprapubic catheter placement on June 3rd (16 Jun 2019 09:38)      Currently admitted to medicine with the primary diagnosis of Suprapubic catheter dysfunction, initial encounter     not endorsing any pain    Besides the pertinent positives and negatives described above, the ROS was within normal limits.    PAST MEDICAL & SURGICAL HISTORY  Asthma  Urinary retention  Urinary calculi  Spastic quadriplegia  Osteoporosis  Seizure: last seizure &gt;10 years ago  Cerebral palsy  BPH (benign prostatic hyperplasia)  Suprapubic catheter  H/O cystoscopy  S/P percutaneous endoscopic gastrostomy (PEG) tube placement    SOCIAL HISTORY:    ALLERGIES:  No Known Allergies    MEDICATIONS:  STANDING MEDICATIONS  baclofen 10 milliGRAM(s) Oral every 8 hours  cefTRIAXone   IVPB 1 Gram(s) IV Intermittent every 24 hours  chlorhexidine 4% Liquid 1 Application(s) Topical <User Schedule>  docusate sodium Liquid 100 milliGRAM(s) Oral two times a day  enoxaparin Injectable 40 milliGRAM(s) SubCutaneous daily  PHENobarbital 64.8 milliGRAM(s) Oral daily  polyethylene glycol 3350 17 Gram(s) Oral daily  senna 2 Tablet(s) Oral at bedtime  vitamin A &amp; D Ointment 1 Application(s) Topical two times a day    PRN MEDICATIONS  acetaminophen    Suspension .. 650 milliGRAM(s) Oral every 4 hours PRN  ALBUTerol    90 MICROgram(s) HFA Inhaler 2 Puff(s) Inhalation every 6 hours PRN  guaiFENesin    Syrup 100 milliGRAM(s) Oral every 6 hours PRN  morphine  - Injectable 4 milliGRAM(s) IV Push every 4 hours PRN    VITALS:   T(F): 98.4  HR: 66  BP: 95/52  RR: 18  SpO2: --    LABS:                        12.2   4.13  )-----------( 181      ( 18 Jun 2019 05:30 )             37.2     06-18    146  |  109  |  20  ----------------------------<  91  4.1   |  26  |  0.5<L>    Ca    8.9      18 Jun 2019 05:30  Mg     2.1     06-18    TPro  6.5  /  Alb  3.5  /  TBili  <0.2  /  DBili  x   /  AST  19  /  ALT  13  /  AlkPhos  79  06-18              Culture - Urine (collected 16 Jun 2019 13:49)  Source: .Urine Suprapubic  Preliminary Report (17 Jun 2019 14:37):    >100,000 CFU/ml Gram Negative Rods          RADIOLOGY:    PHYSICAL EXAM:  GEN: No acute distress  LUNGS: Clear to auscultation bilaterally   HEART: Regular  ABD: Soft, non-tender, non-distended.  EXT: NC/NC/NE/2+PP/REBOLLAR/Skin Intact.   NEURO: AAOX2 baseline    Intravenous access: yes  PEG tube  suprapubic catheter Instructions: This plan will send the code FBSE to the PM system.  DO NOT or CHANGE the price. Detail Level: Simple Price (Do Not Change): 0.00

## 2022-03-10 NOTE — DOWNTIME INTERRUPTION NOTE - TIME SYSTEM UNAVAILABLE
11-Sep-2020 22:00 EMR reviewed. Patient noted to be in restraints within 24 hours of the time of death. Information entered into internal log on: 3/10/2022 at 1238.

## 2022-03-14 NOTE — ED ADULT NURSE NOTE - DRUG PRE-SCREENING (DAST -1)
03/14/22 10:59 AM     See documentation in the [employer] SmartForm       Keyonna Mejia MA
Statement Selected

## 2022-03-23 NOTE — PRE-OP CHECKLIST - ADVANCE DIRECTIVE ADDRESSED/READDRESSED
-no identifiable trigger but had dose adjustment of sertraline to 100mg daily shortly before HA began  -pt prefers to stay on current dose if possible  Advised trial of prednisone 40mg daily x 3d for breakthrough  Side effects discussed    If HA is unchanged, then will have to consider  Decreasing sertraline to 75mg daily done

## 2022-04-05 ENCOUNTER — APPOINTMENT (OUTPATIENT)
Dept: UROLOGY | Facility: CLINIC | Age: 67
End: 2022-04-05
Payer: MEDICARE

## 2022-04-05 PROCEDURE — 51705 CHANGE OF BLADDER TUBE: CPT

## 2022-05-02 NOTE — DISCHARGE NOTE PROVIDER - CARE PROVIDER_API CALL
+ left elbow pain/JOINT PAIN [facility] STEVEN  [address] [place] [id number]  Phone: [phone]  Fax: [phone]  Follow Up Time: 2 weeks    Bonnie Cooper  UROLOGY  [address] [place] [address]  Phone: [phone]  Fax: [phone]  Follow Up Time: 2 weeks

## 2022-05-03 ENCOUNTER — APPOINTMENT (OUTPATIENT)
Dept: UROLOGY | Facility: CLINIC | Age: 67
End: 2022-05-03
Payer: MEDICARE

## 2022-05-03 PROCEDURE — 51710 CHANGE OF BLADDER TUBE: CPT

## 2022-05-17 NOTE — ED ADULT NURSE NOTE - DISCHARGE DATE/TIME
Received records. Put EGD results into surgical history. Will scan GI notes into epic   07-Apr-2018 21:15

## 2022-05-24 ENCOUNTER — APPOINTMENT (OUTPATIENT)
Dept: NEPHROLOGY | Facility: CLINIC | Age: 67
End: 2022-05-24
Payer: MEDICARE

## 2022-05-24 ENCOUNTER — OUTPATIENT (OUTPATIENT)
Dept: OUTPATIENT SERVICES | Facility: HOSPITAL | Age: 67
LOS: 1 days | Discharge: HOME | End: 2022-05-24

## 2022-05-24 VITALS
WEIGHT: 120 LBS | HEART RATE: 82 BPM | HEIGHT: 65 IN | BODY MASS INDEX: 19.99 KG/M2 | DIASTOLIC BLOOD PRESSURE: 50 MMHG | SYSTOLIC BLOOD PRESSURE: 91 MMHG | OXYGEN SATURATION: 96 %

## 2022-05-24 DIAGNOSIS — Z98.890 OTHER SPECIFIED POSTPROCEDURAL STATES: Chronic | ICD-10-CM

## 2022-05-24 DIAGNOSIS — K21.9 GASTRO-ESOPHAGEAL REFLUX DISEASE W/OUT ESOPHAGITIS: ICD-10-CM

## 2022-05-24 DIAGNOSIS — R82.992 HYPEROXALURIA: ICD-10-CM

## 2022-05-24 DIAGNOSIS — Z93.59 OTHER CYSTOSTOMY STATUS: Chronic | ICD-10-CM

## 2022-05-24 DIAGNOSIS — M81.0 AGE-RELATED OSTEOPOROSIS W/OUT CURRENT PATHOLOGICAL FRACTURE: ICD-10-CM

## 2022-05-24 DIAGNOSIS — N13.2 HYDRONEPHROSIS WITH RENAL AND URETERAL CALCULOUS OBSTRUCTION: ICD-10-CM

## 2022-05-24 DIAGNOSIS — J45.909 UNSPECIFIED ASTHMA, UNCOMPLICATED: ICD-10-CM

## 2022-05-24 DIAGNOSIS — Z93.1 GASTROSTOMY STATUS: Chronic | ICD-10-CM

## 2022-05-24 PROCEDURE — 99213 OFFICE O/P EST LOW 20 MIN: CPT | Mod: GC

## 2022-05-24 RX ORDER — AMINO ACIDS/PROTEIN HYDROLYS 15G-100/30
LIQUID (ML) ORAL
Qty: 1 | Refills: 0 | Status: ACTIVE | COMMUNITY
Start: 2022-05-24

## 2022-05-24 RX ORDER — POLYETHYLENE GLYCOL 3350 17 G/17G
17 POWDER, FOR SOLUTION ORAL TWICE DAILY
Qty: 1 | Refills: 0 | Status: COMPLETED | COMMUNITY
Start: 2019-11-22 | End: 2022-05-24

## 2022-05-24 RX ORDER — ALBUTEROL SULFATE 2.5 MG/3ML
(2.5 MG/3ML) SOLUTION RESPIRATORY (INHALATION)
Qty: 1 | Refills: 5 | Status: ACTIVE | COMMUNITY
Start: 2022-05-24

## 2022-05-24 RX ORDER — SULFAMETHOXAZOLE AND TRIMETHOPRIM 800; 160 MG/1; MG/1
800-160 TABLET ORAL
Qty: 14 | Refills: 0 | Status: COMPLETED | COMMUNITY
Start: 2017-08-04 | End: 2022-05-24

## 2022-05-24 RX ORDER — TAMSULOSIN HYDROCHLORIDE 0.4 MG/1
0.4 CAPSULE ORAL
Refills: 0 | Status: COMPLETED | COMMUNITY
End: 2022-05-24

## 2022-05-24 RX ORDER — DENOSUMAB 60 MG/ML
60 INJECTION SUBCUTANEOUS AS DIRECTED
Qty: 1 | Refills: 0 | Status: ACTIVE | COMMUNITY
Start: 2022-05-24

## 2022-05-24 RX ORDER — DOCUSATE SODIUM 50 MG/5ML
100 LIQUID ORAL DAILY
Qty: 100 | Refills: 0 | Status: ACTIVE | COMMUNITY
Start: 2022-05-24

## 2022-05-24 RX ORDER — CALCIUM GLUCONATE 45(500) MG
500 TABLET ORAL TWICE DAILY
Refills: 0 | Status: ACTIVE | COMMUNITY

## 2022-05-24 RX ORDER — POLYETHYLENE GLYCOL 3350 17 G/17G
17 POWDER, FOR SOLUTION ORAL
Qty: 90 | Refills: 0 | Status: COMPLETED | COMMUNITY
Start: 2019-08-09 | End: 2022-05-24

## 2022-05-24 RX ORDER — POLYETHYLENE GLYCOL 3350 17 G/17G
17 POWDER, FOR SOLUTION ORAL DAILY
Qty: 510 | Refills: 3 | Status: COMPLETED | COMMUNITY
Start: 2019-07-22 | End: 2022-05-24

## 2022-05-24 RX ORDER — POTASSIUM CHLORIDE 20 MEQ/15ML
20 MEQ/15ML SOLUTION ORAL
Qty: 1 | Refills: 1 | Status: COMPLETED | COMMUNITY
Start: 2019-08-13 | End: 2022-05-24

## 2022-05-24 RX ORDER — POLYETHYLENE GLYCOL 3350, SODIUM SULFATE ANHYDROUS, SODIUM BICARBONATE, SODIUM CHLORIDE, POTASSIUM CHLORIDE 227.1; 21.5; 6.36; 5.53; .754 G/L; G/L; G/L; G/L; G/L
227.1 POWDER, FOR SOLUTION ORAL
Qty: 1 | Refills: 0 | Status: COMPLETED | COMMUNITY
Start: 2019-06-28 | End: 2022-05-24

## 2022-05-24 RX ORDER — LACTULOSE 10 G/15ML
10 SOLUTION ORAL DAILY
Qty: 300 | Refills: 0 | Status: ACTIVE | COMMUNITY
Start: 2022-05-24

## 2022-05-24 RX ORDER — MEROPENEM 2 G/1
INJECTION, POWDER, FOR SOLUTION INTRAVENOUS
Refills: 0 | Status: COMPLETED | COMMUNITY
End: 2022-05-24

## 2022-05-24 RX ORDER — LACTOSE-REDUCED FOOD/FIBER 0.06 G-1.2
LIQUID (ML) ORAL
Qty: 1 | Refills: 0 | Status: ACTIVE | COMMUNITY
Start: 2022-05-24

## 2022-05-24 RX ORDER — BACLOFEN 10 MG/1
10 TABLET ORAL
Refills: 0 | Status: COMPLETED | COMMUNITY
End: 2022-05-24

## 2022-05-24 RX ORDER — MULTIVITAMIN
TABLET ORAL
Refills: 0 | Status: COMPLETED | COMMUNITY
End: 2022-05-24

## 2022-05-24 RX ORDER — OXYCODONE AND ACETAMINOPHEN 5; 325 MG/1; MG/1
5-325 TABLET ORAL EVERY 8 HOURS
Qty: 9 | Refills: 0 | Status: COMPLETED | COMMUNITY
Start: 2017-09-22 | End: 2022-05-24

## 2022-05-24 RX ORDER — MULTIVIT-MIN/FERROUS GLUCONATE 9 MG/15 ML
LIQUID (ML) ORAL
Qty: 1 | Refills: 0 | Status: ACTIVE | COMMUNITY
Start: 2022-05-24

## 2022-05-24 RX ORDER — POTASSIUM CITRATE 10 MEQ/1
10 MEQ TABLET, EXTENDED RELEASE ORAL TWICE DAILY
Qty: 60 | Refills: 5 | Status: COMPLETED | COMMUNITY
Start: 2018-04-10 | End: 2022-05-24

## 2022-05-24 RX ORDER — BACLOFEN 10 MG/1
10 TABLET ORAL 4 TIMES DAILY
Qty: 30 | Refills: 0 | Status: ACTIVE | COMMUNITY
Start: 2022-05-24

## 2022-05-24 RX ORDER — POLYETHYLENE GLYCOL 3350 17 G/17G
17 POWDER, FOR SOLUTION ORAL TWICE DAILY
Qty: 30 | Refills: 0 | Status: ACTIVE | COMMUNITY
Start: 2022-05-24

## 2022-05-24 RX ORDER — POTASSIUM CITRATE AND CITRIC ACID 334; 1100 MG/5ML; MG/5ML
1100-334 SOLUTION ORAL TWICE DAILY
Qty: 1200 | Refills: 5 | Status: COMPLETED | COMMUNITY
Start: 2021-05-25 | End: 2022-05-24

## 2022-05-24 RX ORDER — OXYBUTYNIN CHLORIDE 10 MG/1
10 TABLET, EXTENDED RELEASE ORAL
Qty: 90 | Refills: 3 | Status: COMPLETED | COMMUNITY
Start: 2018-04-25 | End: 2022-05-24

## 2022-05-24 RX ORDER — CRANBERRY FRUIT EXTRACT 425 MG
425 CAPSULE ORAL TWICE DAILY
Qty: 30 | Refills: 0 | Status: ACTIVE | COMMUNITY
Start: 2022-05-24

## 2022-05-24 NOTE — PHYSICAL EXAM
[General Appearance - Alert] : alert [] : no respiratory distress [Auscultation Breath Sounds / Voice Sounds] : lungs were clear to auscultation bilaterally [Apical Impulse] : the apical impulse was normal [Heart Rate And Rhythm] : heart rate was normal and rhythm regular [Heart Sounds] : normal S1 and S2 [Bowel Sounds] : normal bowel sounds [Abdomen Soft] : soft [Abdomen Tenderness] : non-tender [Sclera] : the sclera and conjunctiva were normal [Neck Appearance] : the appearance of the neck was normal [Murmurs] : no murmurs [FreeTextEntry1] : + suprapubic catheter, no erythema or tenderness

## 2022-05-24 NOTE — ASSESSMENT
[FreeTextEntry1] : 65 y/o M w/ PMhx of CP (wheelchair bound), s/p PEG, s/p suprapubic catheter d/t urethral strictures here for follow up for nephrolithiasis. He recently had left PCNL on 6/29/21. Hx hyperoxaluria, neurogenic bladder, BPH, Seizures, right nonobstructing upper pole calculus\par  \par #nephrolithiasis\par - CMP (faxed from [facility]) noted.\par - 11/2019 CTAP: no hydronephrosis, punctate nonobstructing R upper pole calculus\par - recently had left side PCNL on 6/29/21\par - 24 hr urine noted for volume 2700 ml and hyperoxaluria\par - increase potassium citrate solution to 20 ml three times a day\par - c/w free water, free water 500 cc q 5 hr via PEG tube starting 6 AM but hold 2 AM Free water intake via PEG tube daily. In addition to post PEG feeds flushes\par - Avoid diet with high oxalate e.g. leafy vegetables & nuts.\par - repeat CMP ordered.\par - KUB US\par - f/u in 6 months

## 2022-05-24 NOTE — HISTORY OF PRESENT ILLNESS
[FreeTextEntry1] : He is accompanied by staff Mr Hickey.\par 65 y/o M w/ PMhx of CP (wheelchair bound), s/p PEG, s/p suprapubic catheter d/t urethral strictures here for follow up for nephrolithiasis. He recently had left PCNL on 6/29/21. Hx hyperoxaluria, neurogenic bladder, BPH, Seizures, right nonobstructing upper pole calculus\par \par Overall patient feels well, denies any back pain, hematuria, chest pain, muscle pain, weakness, urinary urgency, dysuria, fever, or chills.\par

## 2022-05-24 NOTE — REVIEW OF SYSTEMS
[Fever] : no fever [Chills] : no chills [Abdominal Pain] : no abdominal pain [Dysuria] : no dysuria [FreeTextEntry8] : suprapubic cath [FreeTextEntry9] : spastic extremities [de-identified] : slurred speech [de-identified] : simple minded [FreeTextEntry1] : ROS negative except if indicated in HPI

## 2022-05-24 NOTE — END OF VISIT
[] : Resident [FreeTextEntry3] : Patient seen and examined with renal resident\par Agree with note A and plan as in follow up \par # kidney stones Ca oxalate\par reviewed labs ok \par reviewed 24 h urine collection / low citrtae in the urine will increase to three times per day \par will need rpeeat renal bladder sono\par rtc in 6 months

## 2022-05-25 DIAGNOSIS — N13.2 HYDRONEPHROSIS WITH RENAL AND URETERAL CALCULOUS OBSTRUCTION: ICD-10-CM

## 2022-05-25 DIAGNOSIS — R82.992 HYPEROXALURIA: ICD-10-CM

## 2022-05-25 DIAGNOSIS — M81.0 AGE-RELATED OSTEOPOROSIS WITHOUT CURRENT PATHOLOGICAL FRACTURE: ICD-10-CM

## 2022-05-25 DIAGNOSIS — N20.0 CALCULUS OF KIDNEY: ICD-10-CM

## 2022-05-27 NOTE — ED PROCEDURE NOTE - CPROC ED SPECIMEN OBTAINED1
urine Patient reports history of dysphagia, choked on something 5/25  - S&S attempted to assess pt, daughter at bedside & pt both refused and want to proceed with regular diet despite known risk of aspiration

## 2022-05-28 NOTE — ED ADULT NURSE NOTE - NSFALLRSKASSESSDT_ED_ALL_ED
,  Pt.'s insurance will only cover 6 tabs a month. Pended below. Please advise on refills?  LV 5/26/2022   06-Dec-2019 16:05

## 2022-06-07 ENCOUNTER — APPOINTMENT (OUTPATIENT)
Dept: UROLOGY | Facility: CLINIC | Age: 67
End: 2022-06-07
Payer: MEDICARE

## 2022-06-07 VITALS — DIASTOLIC BLOOD PRESSURE: 65 MMHG | SYSTOLIC BLOOD PRESSURE: 104 MMHG | HEART RATE: 78 BPM

## 2022-06-07 PROCEDURE — 51710 CHANGE OF BLADDER TUBE: CPT

## 2022-07-07 NOTE — REASON FOR VISIT
Chief Complaint   Patient presents with   • Physical         FEMALE ANNUAL EXAM NOTE      HISTORY    Marie Bernstein is a 32 year old female who presents for an annual exam.      The patient recently took a road trip to [place] with her family.  Mom states that she did really well on the way there but at the end of the trip and on the way back Imani had severe panic and anxiety.  Mom states this is not that uncommon when she is in an unfamiliar place but this was extreme to the point where mom drove 16 hours straight to get her home.  Mom is wondering if there is any medication Imani could take on the rare occasion that they need to travel.    Mom has also noticed a possible lump on Imani's left upper breast.  It does not appear to be painful.      Last pap:  deferred    Last fasting labs:      Hemoglobin A1C (%)   Date Value   02/19/2021 6.6 (H)   06/13/2020 6.4 (H)   10/05/2019 6.7 (H)     Sodium (mmol/L)   Date Value   08/27/2021 137   09/23/2020 130 (L)   07/20/2020 137     Potassium (mmol/L)   Date Value   08/27/2021 4.1   09/23/2020 4.6   07/20/2020 4.6     Chloride (mmol/L)   Date Value   08/27/2021 101   09/23/2020 97 (L)   07/20/2020 101     Carbon Dioxide (mmol/L)   Date Value   08/27/2021 28   09/23/2020 25   07/20/2020 29     BUN (mg/dL)   Date Value   08/27/2021 11   09/23/2020 9   07/20/2020 13     Creatinine (mg/dL)   Date Value   08/27/2021 0.79   09/23/2020 0.83   07/20/2020 0.86     Glucose (mg/dL)   Date Value   08/27/2021 165 (H)   09/23/2020 336 (H)   07/20/2020 126 (H)     MICROALBUMIN, UA (TTL) (mg/dL)   Date Value   10/05/2019 0.53   09/15/2018 0.54   04/21/2018 <0.50     CREATININE, URINE (TOTAL) (mg/dL)   Date Value   10/05/2019 171.00     Creatinine, Urine (mg/dL)   Date Value   08/27/2021 53.40   09/23/2020 29.30     MICROALBUMIN/CREATININE (mg/g)   Date Value   10/05/2019 3.1   09/15/2018 5.5   04/21/2018     UNABLE TO CALCULATE DUE TO LOW ANALYTE CONCENTRATION.     Cholesterol  (mg/dL)   Date Value   03/16/2022 204 (H)   08/27/2021 210 (H)   06/13/2020 185     HDL (mg/dL)   Date Value   03/16/2022 70   08/27/2021 74   06/13/2020 51     LDL (mg/dL)   Date Value   03/16/2022 124   08/27/2021 126   06/13/2020 122     Triglycerides (mg/dL)   Date Value   03/16/2022 52   08/27/2021 49   06/13/2020 61         Vaccines due:  Pneumo, covid    MEDICAL HISTORY    Past Medical History:   Diagnosis Date   • ADD (attention deficit disorder)     strattera   • Amblyopia    • Autism    • Complex partial seizures (CMS/HCC)    • Developmental delay     pervasive   • Hashimoto's thyroiditis    • Otitis media     recurrent   • Type 1 diabetes (CMS/HCC)        SURGICAL HISTORY    Past Surgical History:   Procedure Laterality Date   • Endometrial ablation  07/02/2021   • Salpingectomy  07/02/2021    Bilateral   • Tonsillectomy and adenoidectomy      Adenoids with 2nd set ear tubes   • Tympanostomy      x2   • [place] tooth extraction         SOCIAL HISTORY    Social History     Tobacco Use   • Smoking status: Never Smoker   • Smokeless tobacco: Never Used   Vaping Use   • Vaping Use: never used   Substance Use Topics   • Alcohol use: Never     Alcohol/week: 0.0 standard drinks     Comment: denies   • Drug use: No       FAMILY HISTORY    Family History   Problem Relation Age of Onset   • Thyroid Mother         hypothyroidism   • Cancer Father    • Stroke/TIA Maternal Uncle    • Diabetes Maternal Uncle    • Heart disease Other         early in grandparents   • Neurologic Disorder Other         traumatic brain injury in sibling   • Diabetes Paternal Uncle    • Diabetes Paternal Grandmother    • Diabetes Paternal Grandfather    • Heart disease Maternal Grandfather        MEDICATIONS    Current Outpatient Medications   Medication Sig   • Zinc 30 MG Cap Take 30 mg by mouth daily.   • hydrOXYzine (ATARAX) 10 MG tablet Take 1 tablet by mouth 3 times daily as needed for Anxiety.   • Lantus 100 UNIT/ML vial solution  INJECT 12 UNITS INTO THE SKIN EVERY MORNING AND 8 UNITS EVERY EVENING   • polymyxin b-trimethoprim (POLYTRIM) 67010-0.1 UNIT/ML-% ophthalmic solution Place 1 drop into both eyes every 6 hours. X 5 days   • insulin lispro (HumaLOG) 100 UNIT/ML injectable solution 2 UNITS FOR EVERY 15GM OF CARBS FOR BREAKFAST& LUNCH. INJECT 1.5 UNITS FOR EVERY 15GM OF CARBS DINNER. PLUS SLIDING SCALE. MAX 35 UNITS DAILY   • Continuous Blood Gluc Transmit (Dexcom G6 Transmitter) Misc 1 Device every 3 months. Use with sensor to continuously monitor glucose   • Continuous Blood Gluc Sensor (Dexcom G6 Sensor) Misc Insert new sensor every 10 days.  Use with transmitter to continuously monitor glucose.   • Continuous Blood Gluc  (Dexcom G6 ) Device 1 Device continuous. Use with transmitter and sensors to continuously monitor glucose.   • atomoxetine (STRATTERA) 80 MG capsule TAKE 1 CAPSULE BY MOUTH DAILY   • lamoTRIgine (LaMICtal) 25 MG tablet TAKE 1 TABLET BY MOUTH TWICE DAILY   • lamoTRIgine (LaMICtal) 100 MG tablet Take 1 tablet by mouth 2 times daily.   • blood glucose (ONE TOUCH ULTRA TEST) test strip USE TO TEST BLOOD SUGAR 6 TIMES DAILY  E10.9   • BD Veo Insulin Syr U/F 1/2Unit 31G X 15/64\" 0.3 ML Misc USE TO INJECT INSULIN 5 TIMES PER DAY   • glucagon (Glucagon Emergency) 1 MG injection kit Inject 1 mg into the muscle as needed (hypoglycemia).   • Loratadine (CLARITIN PO) Take 1 tablet by mouth as needed.    • Cetirizine HCl (ZYRTEC PO) Take 1 tablet by mouth daily as needed.    • ONETOUCH DELICA LANCETS 33G Misc Use to test blood sugar 6 times daily. Dx E10.9   • Probiotic Product (PROBIOTIC-10 PO)    • Cholecalciferol (VITAMIN D3) 2000 units capsule Take 2,000 Units by mouth daily.   • DIAZepam (DIASTAT ACUDIAL) 10 MG rectal gel See instructions, use for only prolonged seizures over 5 minutes.next appointment 4/6/15   • DISPENSE BD insulin syringe 0.3/31 G      As directed (Injects 6 times daily)   • Omega-3 Fatty  Acids (OMEGA 3 PO) Take 1 capsule by mouth daily. With vitamin D   • Daily Multiple Vitamins TABS Take 1 tablet by mouth daily.     No current facility-administered medications for this visit.       ALLERGIES    ALLERGIES:   Allergen Reactions   • Penicillins RASH   • Seasonal Other (See Comments)     Itchy eyes, runny nose            PHYSICAL EXAM    Vital Signs:    Vitals:    07/13/22 1604   BP: 112/64   BP Location: LUE - Left upper extremity   Patient Position: Sitting   Cuff Size: Regular   Pulse: 80   Resp: 16   Temp: 98.4 °F (36.9 °C)   TempSrc: Temporal   Weight: 78.5 kg (173 lb)   Height: 4' 9.5\" (1.461 m)     General:  Well developed, well nourished. In no apparent distress.    Eyes:   EOMI (extraocular movements intact). Conjunctivae pink. Sclerae anicteric.    HENT:  Normocephalic, atraumatic. Bilateral external ears are normal. Mucosal membranes of the lips and gums are moist. External nose is normal. Oropharynx is clear with no postnasal drainage.  Neck:  Supple. Nontender. Normal range of motion. No cervical or supraclavicular lymphadenopathy.   No thyromegaly.  No other neck masses.  Trachea midline.  Respiratory:  Normal respiratory effort. Lungs clear to auscultation bilaterally.    Cardiovascular:  Regular rate and rhythm. No murmurs, rubs, or gallops. Normal S1 and S2. No carotid bruits. 2+ dorsalis pedis pulses bilaterally. No peripheral edema.  Gastrointestinal:  Soft. Nontender. Nondistended. Normal bowel sounds. No pulsatile or other abdominal masses. No hepatosplenomegaly.    Breasts: one larger and 3 smaller cystic like masses luq of the left breast.  Non-tender, non-erythematous.. No nipple discharge.      Neurologic:  Alert and oriented x 3.   Integumentary:  Warm. Dry. Pink. No rashes or lesions. No wounds.    Lymphatic:  No axillary or inguinal lymphadenopathy.    Psychiatric: Cooperative. Appropriate mood and affect. Normal judgment.        ORDERS    Orders Placed This Encounter   •  Glycohemoglobin   • Basic Metabolic Panel   • Microalbumin Urine Random   • Zinc 30 MG Cap   • DISCONTD: hydrOXYzine (ATARAX) 10 MG tablet   • hydrOXYzine (ATARAX) 10 MG tablet         ASSESSMENT & PLAN      1. Type 1 diabetes mellitus without complication (CMS/HCC)     - GLYCOHEMOGLOBIN; Future  - BASIC METABOLIC PANEL; Future  - MICROALBUMIN URINE RANDOM; Future    2. Elevated LFTs   cmp in October or with next labs  - COMPREHENSIVE METABOLIC PANEL; Future    3. Anxiety  Hydroxyzine can be tried prn.  Consider benzo if this is not helping    4. Mass of upper outer quadrant of left breast  This is likely benign but should still image.  Most likely Imani would not be able to undergo a mammogram but mom is having a mammogram soon and feels if Imani sees her do it she might.  We should at least try to get an ultrasound.      5. Routine history and physical examination of adult  Continue to work on good exercise and diet.           [Follow-Up] : a follow-up visit [Other: _____] : [unfilled]

## 2022-07-09 NOTE — ED PROVIDER NOTE - NS ED ROS FT
Discharge orders placed this morning. Pt going home with valderrama catheter, leg bag (x2) and valderrama supplies sent home with pt. Education hand out with valderrama care and post op ablation provided. Pt sent home with Flomax from discharge pharmacy. Urology clinic number provided in discharge paperwork for follow up. No complaints of pain upon leaving. Wife driving patient home. Will continue to monitor.    Eyes:  No visual changes, eye pain or discharge.  ENMT:  No hearing changes, pain, no sore throat or runny nose, no difficulty swallowing  Cardiac:  No chest pain, SOB or edema. No chest pain with exertion.  Respiratory:  No cough or respiratory distress. No hemoptysis. + history of asthma  GI:  No nausea, vomiting, diarrhea or abdominal pain.  :  No dysuria, frequency + burning.  MS:  No myalgia, muscle weakness, joint pain or back pain.  Neuro:  No headache or weakness.  No LOC.  Skin:  No skin rash.   Endocrine: No history of thyroid disease or diabetes.

## 2022-07-13 ENCOUNTER — APPOINTMENT (OUTPATIENT)
Dept: UROLOGY | Facility: CLINIC | Age: 67
End: 2022-07-13

## 2022-07-13 VITALS — HEIGHT: 65 IN | WEIGHT: 120 LBS | BODY MASS INDEX: 19.99 KG/M2

## 2022-07-13 PROCEDURE — 51710 CHANGE OF BLADDER TUBE: CPT

## 2022-07-26 NOTE — ED ADULT NURSE NOTE - NS ED NURSE DISCH DISPOSITION
Progress note - Cardiology Office   [facility]  Jos Martinez 61 y o  male MRN: [id number]  : 1963  Unit/Bed#:  Encounter: [id number]      Assessment:     1  Exertional shortness of breath    2  Dyslipidemia    3  Bradycardia    4  Coronary artery disease involving native coronary artery of native heart without angina pectoris    5  Palpitations    6  S/P coronary artery stent placement    7  Obesity (BMI 30-39  9)        Discussion summary and Plan:    1  Exertional shortness of breath  Patient has exertional shortness of breath but seems little better  He had cardiac catheterization found to have distal circumflex 100% occluded and codominant right has 80-85% successful stenting of RCA was done and medical therapy for his circumflex  Nonobstructive disease of LAD noted  2  Bradycardia with PACs  Patient is having frequent palpitations  While he was in the hospital under monitoring he was noted to have episodes of junctional bradycardia, heart rate dropping to 30-35 beats per minute  He also had frequent palpitations unfortunately we cannot give him any medication due to size significant bradycardia  He has a chronotropic incompetency with his heart rate not able to go up with exercise  He had a family history of pacemaker  We discussed with him issue at length  At this time he had a class 2A indication to have a permanent pacemaker which was discussed with him he agrees with the plan and he will be scheduled for pacemaker  All risks benefit alternate complication of the procedure discussed with him and his wife  3  Dyslipidemia  His risk for cardiovascular event is high  He has a nonobstructive coronary artery disease as well as obstructive coronary artery disease will increase Crestor to 20 mg daily      4  Coronary artery disease status post angioplasty of codominant RCA and has 100% occluded distal circumflex which will be managed with medical therapy continue dual platelet therapy    5  Obesity with BMI around 37 5 encouraged him to lose weight    6  History of marijuana use   As per patient has quit smoking  7  Possible sleep apnea  At some point he will need a sleep study    Plan  Patient will be scheduled for dual-chamber pacemaker at [facility]  All risks benefit discussed with him  Further plan as also pacemaker become available  Patient was advised and educated to call our office  immediately if  patient has any new symptoms of chest pain/shortness of breath, near-syncope, syncope, light headedness sustained palpitations  or any other cardiovascular symptoms before their scheduled follow-up appointment  Office #[phone]  Thank you for your consultation  If you have any question please call me at [phone]    Counseling :  A description of the counseling  Goals and Barriers  Patient's ability to self care: Yes  Medication side effect reviewed with patient in detail and all their questions answered to their satisfaction  Primary Care Physician : Franklin Kee DO          HPI :     Damari Gama is a 61y o  year old male who was referred by primary care doctor for exertional shortness of breath and palpitations  Patient who has medical history significant for bradycardia, obesity with BMI around 44 has noted lately he is having exertional shortness of breath and then racing of his heart  He had gained about 10-15 lb in the last year and since then he has noted when he climbs stairs or he walks he gets short of breath and occasionally get palpitations  He was noted to have first-degree AV block as well as episodes of bradycardia today also his heart rate is 56 beats per minute he has sinus arrhythmia  And first-degree AV block EKG also shows T-wave inversions and ST flattening in inferior leads along with Q-waves in inferior leads    He denies any personal history of MI in the past   There is no family history of premature coronary artery disease but his mother did have pacemaker  Other medical history significant for history of kidney stones  He never smoked but lately he started using marijuana mostly in the evening for some time to sleep  No nausea no vomiting no fever no chills no leg edema  Past surgical history  Multiple orthopedic surgery including shoulder surgery and biceps surgery  06/29/2022  Above reviewed  Patient was initially seen by us for exertion shortness of breath and palpitations  He also had history of bradycardia  Holter monitor shows his average heart rate was 49 beats per minute and he has frequent PACs defer 7 6% of his total beats  He underwent nuclear stress test   He tried walking on the treadmill he did walk for 8 minutes and he could only achieve 73% of his maximum predicted heart rate and then he became bradycardic  He did with symptoms of dizziness and lightheadedness and he was in junctional bradycardia and test was changed to pharmacological test   Which shows he had a EKG changes as well as ischemia  Holter monitor shows patient has bradycardia for 35 hours per  He admits that he snores at night  He feels he has more easy fatigue and tired but denies any symptoms of dizziness or lightheadedness  He has not passed out  There is a family history of pacemaker his mother also had a pacemaker and his father also had a pacemaker at some point of his life  07/06/2022  Above reviewed  Patient came for follow-up  He was initially seen for exertional shortness of breath and history of bradycardia  His Holter monitor shows he has heart rate average around 49 beats per minute  He has frequent PACs there were 7 6% of his total beats  He feels palpitations he has short atrial runs and we were not able to treat with beta-blocker due to his slow heart rate    While he was in the hospital he was noted to have significant episodes of bradycardia with heart rate dropping to mid 35s  He also feels fatigue and tired and he has junctional bradycardia  His cardiac catheterization shows he had a distal circ disease which is chronic total occlusion and he has codominant RCA successfully stented with drug-eluting stent  His cholesterol was high and he is taking his medications  He used to smoke marijuana he has quit smoking it  He feels fatigue and tired but denies any nausea and vomiting recently he felt couple episode of dizziness  He had a family history of pacemakers  He has no nausea he denies any syncopal episode at this time  He came with his wife  Review of Systems   Constitutional: Positive for fatigue  Negative for activity change, chills, diaphoresis, fever and unexpected weight change  HENT: Negative for congestion  Eyes: Negative for discharge and redness  Respiratory: Positive for shortness of breath  Negative for cough, chest tightness and wheezing  Cardiovascular: Positive for palpitations  Negative for chest pain and leg swelling  Gastrointestinal: Negative for abdominal pain, diarrhea and nausea  Endocrine: Negative  Genitourinary: Negative for decreased urine volume and urgency  Musculoskeletal: Positive for arthralgias  Negative for back pain and gait problem  Skin: Negative for rash and wound  Allergic/Immunologic: Negative  Neurological: Negative for dizziness, seizures, syncope, weakness, light-headedness and headaches  Hematological: Negative  Psychiatric/Behavioral: Negative for agitation and confusion  The patient is nervous/anxious          Historical Information   Past Medical History:   Diagnosis Date    Hyperlipidemia     Irregular heart beat     bradycardia post stress test, holter monitor on until 05/05/22 Dr Cr Records Kidney stone      Past Surgical History:   Procedure Laterality Date    BICEPS TENDON REPAIR Bilateral     CARDIAC CATHETERIZATION N/A 7/20/2022    Procedure: Cardiac Coronary Angiogram; Surgeon: Renae Keyes MD;  [facility]; Service: Cardiology    CARDIAC CATHETERIZATION Left 7/20/2022    Procedure: Cardiac Left Heart Cath;  Surgeon: Renae Keyes MD;  [facility]; Service: Cardiology    CARDIAC CATHETERIZATION N/A 7/20/2022    Procedure: Cardiac pci;  Surgeon: Renae Keyes MD;  [facility]; Service: Cardiology    INGUINAL HERNIA REPAIR      LASIK Bilateral     ROTATOR CUFF REPAIR Right     VASECTOMY       Social History     Substance and Sexual Activity   Alcohol Use Not Currently    Comment: rare     Social History     Substance and Sexual Activity   Drug Use Not Currently    Types: Marijuana     Social History     Tobacco Use   Smoking Status Never Smoker   Smokeless Tobacco Never Used     Family History:   Family History   Problem Relation Age of Onset    Hypertension Mother     Diabetes Mother     Hypertension Father     Colon polyps Father     Mental illness Neg Hx        Meds/Allergies     No Known Allergies    Current Outpatient Medications:     aspirin (ECOTRIN LOW STRENGTH) 81 mg EC tablet, Take 1 tablet (81 mg total) by mouth daily, Disp: 100 tablet, Rfl: 1    clopidogrel (PLAVIX) 75 mg tablet, Take 1 tablet (75 mg total) by mouth daily, Disp: 30 tablet, Rfl: 11    rosuvastatin (CRESTOR) 20 MG tablet, Take 0 5 tablets (10 mg total) by mouth daily, Disp: 90 tablet, Rfl: 1    Vitals: Blood pressure 130/80, pulse (!) 52, temperature (!) 97 °F (36 1 °C), height 5' 10" (1 778 m), weight 119 kg (262 lb), SpO2 99 %  ?  Body mass index is 37 59 kg/m²  Wt Readings from Last 3 Encounters:   07/26/22 119 kg (262 lb)   07/20/22 116 kg (255 lb 15 3 oz)   06/29/22 121 kg (266 lb)     Vitals:    07/26/22 1140   Weight: 119 kg (262 lb)     BP Readings from Last 3 Encounters:   07/26/22 130/80   07/21/22 127/65   06/29/22 128/76         Physical Exam  Constitutional:       General: He is not in acute distress  Appearance: He is well-developed  He is not diaphoretic  Neck:      Thyroid: No thyromegaly  Vascular: No JVD  Trachea: No tracheal deviation  Cardiovascular:      Rate and Rhythm: Regular rhythm  Bradycardia present  Heart sounds: S1 normal and S2 normal  Heart sounds not distant  Murmur heard  Systolic (ejection) murmur is present with a grade of 2/6  No friction rub  No gallop  No S3 or S4 sounds  Pulmonary:      Effort: Pulmonary effort is normal  No respiratory distress  Breath sounds: Normal breath sounds  No wheezing or rales  Chest:      Chest wall: No tenderness  Abdominal:      General: Bowel sounds are normal  There is no distension  Palpations: Abdomen is soft  Tenderness: There is no abdominal tenderness  Musculoskeletal:         General: No deformity  Cervical back: Neck supple  Skin:     General: Skin is warm and dry  Coloration: Skin is not pale  Findings: No rash  Neurological:      Mental Status: He is alert and oriented to person, place, and time  Psychiatric:         Behavior: Behavior normal          Judgment: Judgment normal             Diagnostic Studies Review Cardio:    Echo Doppler 05/03/2022 shows normal LV systolic function EF 34-38%, borderline LV thickness  No significant valvular disease  Nuclear stress test   Nuclear stress test shows small reversible ischemia in the inferior wall with sum score of 5  Patient did have some junctional rhythm during recovery along with bradycardia  EKG shows 1 mm ST depression which was upsloping and horizontal   EF was 64%  Holter monitor shows patient has average heart rate is 49 beats per minute  Maximum heart rate was 114 beats per minute  Patient has frequent PACs there were 7 6% of the total beats  Patient was in bradycardia for 35 hours  He has a 2 9 seconds pause during sleep hours        EKG:    Twelve lead EKG done on 04/07/2022 shows sinus bradycardia heart rate 56 beats per minute first-degree AV block  Q-wave noted in inferior leads with ST flattening cannot rule out inferior wall infarction    Imaging:  Chest X-Ray:   No Chest XR results available for this patient  CT-scan of the chest:     No CTA results available for this patient  Lab Review     BMP:  Lab Results   Component Value Date    SODIUM 138 07/21/2022    K 3 7 07/21/2022     07/21/2022    CO2 24 07/21/2022    BUN 15 07/21/2022    CREATININE 1 00 07/21/2022    GLUC 101 07/21/2022    CALCIUM 8 7 07/21/2022    EGFR 82 07/21/2022     Troponins:    LFT:  Lab Results   Component Value Date    AST 20 03/10/2022    ALT 25 03/10/2022    TP 6 8 03/10/2022    ALB 4 5 03/10/2022      Lipid Profile:   Lab Results   Component Value Date    CHOLESTEROL 227 (H) 03/10/2022    HDL 47 03/10/2022    LDLCALC 151 (H) 03/10/2022    TRIG 161 (H) 03/10/2022     Lab Results   Component Value Date    CHOLESTEROL 227 (H) 03/10/2022     The 10-year ASCVD risk score (Elis Kent et al , 2013) is: 9 6%    Values used to calculate the score:      Age: 61 years      Sex: Male      Is Non- : No      Diabetic: No      Tobacco smoker: No      Systolic Blood Pressure: 325 mmHg      Is BP treated: No      HDL Cholesterol: 47 mg/dL      Total Cholesterol: 227 mg/dL      Dr Yudelka Wray MD [facility] [place]      "This note has been constructed using a voice recognition system  Therefore there may be syntax, spelling, and/or grammatical errors   Please call if you have any questions  " Discharged

## 2022-08-16 ENCOUNTER — RX RENEWAL (OUTPATIENT)
Age: 67
End: 2022-08-16

## 2022-08-17 ENCOUNTER — APPOINTMENT (OUTPATIENT)
Dept: UROLOGY | Facility: CLINIC | Age: 67
End: 2022-08-17

## 2022-08-17 DIAGNOSIS — N39.3 STRESS INCONTINENCE (FEMALE) (MALE): ICD-10-CM

## 2022-08-17 PROCEDURE — 51710 CHANGE OF BLADDER TUBE: CPT

## 2022-09-20 ENCOUNTER — APPOINTMENT (OUTPATIENT)
Dept: UROLOGY | Facility: CLINIC | Age: 67
End: 2022-09-20

## 2022-09-20 PROCEDURE — 51705 CHANGE OF BLADDER TUBE: CPT

## 2022-10-18 ENCOUNTER — APPOINTMENT (OUTPATIENT)
Dept: UROLOGY | Facility: CLINIC | Age: 67
End: 2022-10-18

## 2022-10-18 DIAGNOSIS — N36.42 INTRINSIC SPHINCTER DEFICIENCY (ISD): ICD-10-CM

## 2022-10-18 DIAGNOSIS — R32 UNSPECIFIED URINARY INCONTINENCE: ICD-10-CM

## 2022-10-18 PROCEDURE — 51710 CHANGE OF BLADDER TUBE: CPT

## 2022-11-16 ENCOUNTER — APPOINTMENT (OUTPATIENT)
Dept: UROLOGY | Facility: CLINIC | Age: 67
End: 2022-11-16

## 2022-11-16 PROCEDURE — 51705 CHANGE OF BLADDER TUBE: CPT

## 2022-11-21 ENCOUNTER — EMERGENCY (EMERGENCY)
Facility: HOSPITAL | Age: 67
LOS: 0 days | Discharge: HOME | End: 2022-11-21
Attending: EMERGENCY MEDICINE | Admitting: EMERGENCY MEDICINE

## 2022-11-21 ENCOUNTER — NON-APPOINTMENT (OUTPATIENT)
Age: 67
End: 2022-11-21

## 2022-11-21 VITALS
DIASTOLIC BLOOD PRESSURE: 57 MMHG | OXYGEN SATURATION: 96 % | SYSTOLIC BLOOD PRESSURE: 111 MMHG | HEART RATE: 88 BPM | TEMPERATURE: 99 F | RESPIRATION RATE: 18 BRPM

## 2022-11-21 VITALS
DIASTOLIC BLOOD PRESSURE: 60 MMHG | WEIGHT: 149.91 LBS | TEMPERATURE: 98 F | OXYGEN SATURATION: 95 % | SYSTOLIC BLOOD PRESSURE: 111 MMHG | HEART RATE: 96 BPM

## 2022-11-21 DIAGNOSIS — Z93.1 GASTROSTOMY STATUS: Chronic | ICD-10-CM

## 2022-11-21 DIAGNOSIS — Z99.3 DEPENDENCE ON WHEELCHAIR: ICD-10-CM

## 2022-11-21 DIAGNOSIS — G40.909 EPILEPSY, UNSPECIFIED, NOT INTRACTABLE, WITHOUT STATUS EPILEPTICUS: ICD-10-CM

## 2022-11-21 DIAGNOSIS — Z87.442 PERSONAL HISTORY OF URINARY CALCULI: ICD-10-CM

## 2022-11-21 DIAGNOSIS — Z98.890 OTHER SPECIFIED POSTPROCEDURAL STATES: Chronic | ICD-10-CM

## 2022-11-21 DIAGNOSIS — G80.9 CEREBRAL PALSY, UNSPECIFIED: ICD-10-CM

## 2022-11-21 DIAGNOSIS — N39.0 URINARY TRACT INFECTION, SITE NOT SPECIFIED: ICD-10-CM

## 2022-11-21 DIAGNOSIS — N40.0 BENIGN PROSTATIC HYPERPLASIA WITHOUT LOWER URINARY TRACT SYMPTOMS: ICD-10-CM

## 2022-11-21 DIAGNOSIS — R30.0 DYSURIA: ICD-10-CM

## 2022-11-21 DIAGNOSIS — R31.9 HEMATURIA, UNSPECIFIED: ICD-10-CM

## 2022-11-21 DIAGNOSIS — Z93.59 OTHER CYSTOSTOMY STATUS: Chronic | ICD-10-CM

## 2022-11-21 DIAGNOSIS — R35.0 FREQUENCY OF MICTURITION: ICD-10-CM

## 2022-11-21 LAB
ALBUMIN SERPL ELPH-MCNC: 4.4 G/DL — SIGNIFICANT CHANGE UP (ref 3.5–5.2)
ALP SERPL-CCNC: 86 U/L — SIGNIFICANT CHANGE UP (ref 30–115)
ALT FLD-CCNC: 9 U/L — SIGNIFICANT CHANGE UP (ref 0–41)
ANION GAP SERPL CALC-SCNC: 12 MMOL/L — SIGNIFICANT CHANGE UP (ref 7–14)
APPEARANCE UR: ABNORMAL
AST SERPL-CCNC: 18 U/L — SIGNIFICANT CHANGE UP (ref 0–41)
BASOPHILS # BLD AUTO: 0.03 K/UL — SIGNIFICANT CHANGE UP (ref 0–0.2)
BASOPHILS NFR BLD AUTO: 0.4 % — SIGNIFICANT CHANGE UP (ref 0–1)
BILIRUB SERPL-MCNC: 0.3 MG/DL — SIGNIFICANT CHANGE UP (ref 0.2–1.2)
BILIRUB UR-MCNC: NEGATIVE — SIGNIFICANT CHANGE UP
BUN SERPL-MCNC: 11 MG/DL — SIGNIFICANT CHANGE UP (ref 10–20)
CALCIUM SERPL-MCNC: 8.9 MG/DL — SIGNIFICANT CHANGE UP (ref 8.4–10.5)
CHLORIDE SERPL-SCNC: 100 MMOL/L — SIGNIFICANT CHANGE UP (ref 98–110)
CO2 SERPL-SCNC: 26 MMOL/L — SIGNIFICANT CHANGE UP (ref 17–32)
COLOR SPEC: YELLOW — SIGNIFICANT CHANGE UP
CREAT SERPL-MCNC: 0.6 MG/DL — LOW (ref 0.7–1.5)
DIFF PNL FLD: ABNORMAL
EGFR: 106 ML/MIN/1.73M2 — SIGNIFICANT CHANGE UP
EOSINOPHIL # BLD AUTO: 0.2 K/UL — SIGNIFICANT CHANGE UP (ref 0–0.7)
EOSINOPHIL NFR BLD AUTO: 2.4 % — SIGNIFICANT CHANGE UP (ref 0–8)
GLUCOSE SERPL-MCNC: 94 MG/DL — SIGNIFICANT CHANGE UP (ref 70–99)
GLUCOSE UR QL: NEGATIVE — SIGNIFICANT CHANGE UP
HCT VFR BLD CALC: 37.5 % — LOW (ref 42–52)
HGB BLD-MCNC: 13.3 G/DL — LOW (ref 14–18)
IMM GRANULOCYTES NFR BLD AUTO: 0.2 % — SIGNIFICANT CHANGE UP (ref 0.1–0.3)
KETONES UR-MCNC: NEGATIVE — SIGNIFICANT CHANGE UP
LEUKOCYTE ESTERASE UR-ACNC: ABNORMAL
LYMPHOCYTES # BLD AUTO: 1.5 K/UL — SIGNIFICANT CHANGE UP (ref 1.2–3.4)
LYMPHOCYTES # BLD AUTO: 18.2 % — LOW (ref 20.5–51.1)
MAGNESIUM SERPL-MCNC: 1.9 MG/DL — SIGNIFICANT CHANGE UP (ref 1.8–2.4)
MCHC RBC-ENTMCNC: 31.2 PG — HIGH (ref 27–31)
MCHC RBC-ENTMCNC: 35.5 G/DL — SIGNIFICANT CHANGE UP (ref 32–37)
MCV RBC AUTO: 88 FL — SIGNIFICANT CHANGE UP (ref 80–94)
MONOCYTES # BLD AUTO: 0.65 K/UL — HIGH (ref 0.1–0.6)
MONOCYTES NFR BLD AUTO: 7.9 % — SIGNIFICANT CHANGE UP (ref 1.7–9.3)
NEUTROPHILS # BLD AUTO: 5.82 K/UL — SIGNIFICANT CHANGE UP (ref 1.4–6.5)
NEUTROPHILS NFR BLD AUTO: 70.9 % — SIGNIFICANT CHANGE UP (ref 42.2–75.2)
NITRITE UR-MCNC: NEGATIVE — SIGNIFICANT CHANGE UP
NRBC # BLD: 0 /100 WBCS — SIGNIFICANT CHANGE UP (ref 0–0)
PH UR: 8 — SIGNIFICANT CHANGE UP (ref 5–8)
PLATELET # BLD AUTO: 232 K/UL — SIGNIFICANT CHANGE UP (ref 130–400)
POTASSIUM SERPL-MCNC: 4.1 MMOL/L — SIGNIFICANT CHANGE UP (ref 3.5–5)
POTASSIUM SERPL-SCNC: 4.1 MMOL/L — SIGNIFICANT CHANGE UP (ref 3.5–5)
PROT SERPL-MCNC: 7 G/DL — SIGNIFICANT CHANGE UP (ref 6–8)
PROT UR-MCNC: ABNORMAL
RBC # BLD: 4.26 M/UL — LOW (ref 4.7–6.1)
RBC # FLD: 13.2 % — SIGNIFICANT CHANGE UP (ref 11.5–14.5)
RBC CASTS # UR COMP ASSIST: 1 /HPF — SIGNIFICANT CHANGE UP (ref 0–4)
SODIUM SERPL-SCNC: 138 MMOL/L — SIGNIFICANT CHANGE UP (ref 135–146)
SP GR SPEC: 1.01 — SIGNIFICANT CHANGE UP (ref 1.01–1.03)
TRI-PHOS CRY UR QL COMP ASSIST: ABNORMAL
UROBILINOGEN FLD QL: SIGNIFICANT CHANGE UP
WBC # BLD: 8.22 K/UL — SIGNIFICANT CHANGE UP (ref 4.8–10.8)
WBC # FLD AUTO: 8.22 K/UL — SIGNIFICANT CHANGE UP (ref 4.8–10.8)
WBC UR QL: 2 /HPF — SIGNIFICANT CHANGE UP (ref 0–5)

## 2022-11-21 PROCEDURE — 74177 CT ABD & PELVIS W/CONTRAST: CPT | Mod: 26,MA

## 2022-11-21 PROCEDURE — 99284 EMERGENCY DEPT VISIT MOD MDM: CPT

## 2022-11-21 RX ORDER — SODIUM CHLORIDE 9 MG/ML
500 INJECTION, SOLUTION INTRAVENOUS ONCE
Refills: 0 | Status: COMPLETED | OUTPATIENT
Start: 2022-11-21 | End: 2022-11-21

## 2022-11-21 RX ORDER — CEFPODOXIME PROXETIL 100 MG
10 TABLET ORAL
Qty: 200 | Refills: 0
Start: 2022-11-21 | End: 2022-11-30

## 2022-11-21 RX ORDER — ONDANSETRON 8 MG/1
4 TABLET, FILM COATED ORAL ONCE
Refills: 0 | Status: COMPLETED | OUTPATIENT
Start: 2022-11-21 | End: 2022-11-21

## 2022-11-21 RX ADMIN — SODIUM CHLORIDE 500 MILLILITER(S): 9 INJECTION, SOLUTION INTRAVENOUS at 16:28

## 2022-11-21 RX ADMIN — ONDANSETRON 4 MILLIGRAM(S): 8 TABLET, FILM COATED ORAL at 16:45

## 2022-11-21 NOTE — ED PROVIDER NOTE - ATTENDING CONTRIBUTION TO CARE
67-year-old male past medical history of cerebral palsy, seizure disorder, spastic paraplegia wheelchair-bound, PEG tube, suprapubic catheter, BPH, bladder neck incontinence, kidney stones, follows with Dr. Joyner for urology, presents from group home with aide at bedside with UTI symptoms and abdominal pain x2 days.  Patient states "I think I have a UTI".  Patient reports stinging pain with urination, urinary frequency, and hematuria.  Patient also states his suprapubic catheter is leaking, last changed on November 16 per patient.  No fever.  Has diffuse abdominal pain with associated sharp constant nonradiating.  Vomited 1 time this morning Per aide at bedside.  No diarrhea or constipation.    On exam, AFVSS, Well appearing, No acute distress, NCAT, EOMI, PERRLA, MMM, Neck supple, LCTAB, RRR nl s1s2 No mrg, Abdomen Soft PEG tube in place clean dry intact, suprapubic cath site clean, mild clear fluid leaking, no erythema,, mild suprapubic tenderness to palpation, ND, AAOx3, contractures chronic, patient presents alert answering questions appropriately, no Focal Deficits, No LE edema or calf TTP,    A/P; urinary symptoms, abdominal pain, vomited once today, suprapubic cath leaking, will do labs UA culture urology consult Zofran fluids reeval

## 2022-11-21 NOTE — ED PROVIDER NOTE - CLINICAL SUMMARY MEDICAL DECISION MAKING FREE TEXT BOX
pt seen and cleared by urology, states doesn't need catheter replaced, likely spasm, CT w cystitis, pt w uti sx, dc home w po abx and close  f/u 1 week dr caceres, strict return precautions

## 2022-11-21 NOTE — ED PROVIDER NOTE - NSFOLLOWUPINSTRUCTIONS_ED_ALL_ED_FT
[FreeTextEntry1] : 44 yo  presents for GYN annual without complaints. \par \par 1. GYN annual\par -pap with HPV today\par -routine labs\par -STI testing\par -mammogram ordered\par \par 2. IUD\par -doing well with new mirena, strings visualized at os\par -reassurance provided re bleeding profile, not unusual to see occasional spotting or bleeding with mirena. Significant weight loss likely contributing\par -can return if worsens\par \par RTC 1 year or prn
Urinary Tract Infection    A urinary tract infection (UTI) is an infection of any part of the urinary tract, which includes the kidneys, ureters, bladder, and urethra. Risk factors include ignoring your need to urinate, wiping back to front if female, being an uncircumcised male, and having diabetes or a weak immune system. Symptoms include frequent urination, pain or burning with urination, foul smelling urine, cloudy urine, pain in the lower abdomen, blood in the urine, and fever. If you were prescribed an antibiotic medicine, take it as told by your health care provider. Do not stop taking the antibiotic even if you start to feel better.    SEEK IMMEDIATE MEDICAL CARE IF YOU HAVE THE FOLLOWING SYMPTOMS: severe back or abdominal pain, inability to keep fluids or medicine down, dizziness/lightheadedness, or a change in mental status.

## 2022-11-21 NOTE — ED PROVIDER NOTE - CARE PROVIDER_API CALL
Solis Joyner)  Urology  [address] [place] [address], [address]  [place] [address]  Phone: [phone]  Fax: [phone]  Follow Up Time: 1-3 Days

## 2022-11-21 NOTE — ED PROVIDER NOTE - PROGRESS NOTE DETAILS
[facility] consulted - evaluated the patient and no urgent need for replcament of alatorre. Alatorre functioning. Pt advised to fu with Dr Joyner op. Pending completion of labs, ct read and reevaluation

## 2022-11-21 NOTE — ED ADULT NURSE NOTE - CHPI ED NUR SYMPTOMS POS
BURNING URINATION/DYSURIA Mustarde Flap Text: The defect edges were debeveled with a #15 scalpel blade.  Given the size, depth and location of the defect and the proximity to free margins a Mustarde flap was deemed most appropriate.  Using a sterile surgical marker, an appropriate flap was drawn incorporating the defect. The area thus outlined was incised with a #15 scalpel blade.  The skin margins were undermined to an appropriate distance in all directions utilizing iris scissors.

## 2022-11-21 NOTE — ED ADULT NURSE NOTE - OBJECTIVE STATEMENT
hx of cerebral palsy- wheelchair bound with suprapubic catheter c/o leaking around catheter and abd pain that he associated with UTI.

## 2022-11-21 NOTE — ED ADULT TRIAGE NOTE - CHIEF COMPLAINT QUOTE
pt brought in from group home, c/o pain and leaking around suprapubic catheter site. catheter last changed 11/16/22

## 2022-11-21 NOTE — ED PROVIDER NOTE - PATIENT PORTAL LINK FT
You can access the FollowMyHealth Patient Portal offered by [facility] by registering at the following website: [email or web address]. By joining [facility]’s FollowMyHealth portal, you will also be able to view your health information using other applications (apps) compatible with our system.

## 2022-11-21 NOTE — ED ADULT NURSE NOTE - NS ED PATIENT SAFETY CONCERN
Detail Level: Detailed Quality 137: Melanoma: Continuity Of Care - Recall System: Patient information entered into a recall system that includes: target date for the next exam specified AND a process to follow up with patients regarding missed or unscheduled appointments Detail Level: Simple No

## 2022-11-21 NOTE — ED PROVIDER NOTE - PHYSICAL EXAMINATION
CONSTITUTIONAL: NAD  SKIN: Warm dry  HEAD: NCAT  EYES: NL inspection  ENT: MMM  NECK: Supple; non tender.  CARD: RRR  RESP: CTAB  ABD: S, no rebound/guarding, +mild leaking around suprapubic catheter   EXT: no pedal edema  NEURO: Grossly unremarkable  PSYCH: Cooperative, appropriate.

## 2022-11-21 NOTE — ED PROVIDER NOTE - CHILD ABUSE FACILITY
Spoke to patient she stated she is not interested in this test at this time.  Radha Moore CMA ([facility])     [facility]

## 2022-11-21 NOTE — ED PROVIDER NOTE - OBJECTIVE STATEMENT
66 yo my h/o cerebral palsy, suprapubic catheter, g-tube, seizure, spasstic paraplegia wheelchair bound, bph, kidney stones presents from group home pw dysuria, hematuria, urinary freq, abd pain x 2 days. Says sxs feel like previous UTI infections. Had 1 episode of vomiting in the AM.   Accompanied by home aide

## 2022-11-22 RX ORDER — CEFPODOXIME PROXETIL 100 MG
1 TABLET ORAL
Qty: 20 | Refills: 0
Start: 2022-11-22 | End: 2022-12-01

## 2022-11-23 LAB
CULTURE RESULTS: SIGNIFICANT CHANGE UP
SPECIMEN SOURCE: SIGNIFICANT CHANGE UP

## 2022-12-11 ENCOUNTER — NON-APPOINTMENT (OUTPATIENT)
Age: 67
End: 2022-12-11

## 2022-12-12 ENCOUNTER — INPATIENT (INPATIENT)
Facility: HOSPITAL | Age: 67
LOS: 3 days | Discharge: HOME | End: 2022-12-16
Attending: STUDENT IN AN ORGANIZED HEALTH CARE EDUCATION/TRAINING PROGRAM | Admitting: STUDENT IN AN ORGANIZED HEALTH CARE EDUCATION/TRAINING PROGRAM

## 2022-12-12 VITALS
RESPIRATION RATE: 18 BRPM | TEMPERATURE: 98 F | DIASTOLIC BLOOD PRESSURE: 64 MMHG | OXYGEN SATURATION: 98 % | HEART RATE: 82 BPM | SYSTOLIC BLOOD PRESSURE: 104 MMHG

## 2022-12-12 DIAGNOSIS — Z93.59 OTHER CYSTOSTOMY STATUS: Chronic | ICD-10-CM

## 2022-12-12 DIAGNOSIS — Z93.1 GASTROSTOMY STATUS: Chronic | ICD-10-CM

## 2022-12-12 DIAGNOSIS — Z98.890 OTHER SPECIFIED POSTPROCEDURAL STATES: Chronic | ICD-10-CM

## 2022-12-12 LAB
ALBUMIN SERPL ELPH-MCNC: 4.3 G/DL — SIGNIFICANT CHANGE UP (ref 3.5–5.2)
ALP SERPL-CCNC: 81 U/L — SIGNIFICANT CHANGE UP (ref 30–115)
ALT FLD-CCNC: 12 U/L — SIGNIFICANT CHANGE UP (ref 0–41)
ANION GAP SERPL CALC-SCNC: 11 MMOL/L — SIGNIFICANT CHANGE UP (ref 7–14)
AST SERPL-CCNC: 19 U/L — SIGNIFICANT CHANGE UP (ref 0–41)
BASE EXCESS BLDV CALC-SCNC: 6.6 MMOL/L — HIGH (ref -2–3)
BASOPHILS # BLD AUTO: 0.02 K/UL — SIGNIFICANT CHANGE UP (ref 0–0.2)
BASOPHILS NFR BLD AUTO: 0.2 % — SIGNIFICANT CHANGE UP (ref 0–1)
BILIRUB SERPL-MCNC: 0.4 MG/DL — SIGNIFICANT CHANGE UP (ref 0.2–1.2)
BUN SERPL-MCNC: 7 MG/DL — LOW (ref 10–20)
CA-I SERPL-SCNC: 1.22 MMOL/L — SIGNIFICANT CHANGE UP (ref 1.15–1.33)
CALCIUM SERPL-MCNC: 9.4 MG/DL — SIGNIFICANT CHANGE UP (ref 8.4–10.5)
CHLORIDE SERPL-SCNC: 96 MMOL/L — LOW (ref 98–110)
CO2 SERPL-SCNC: 29 MMOL/L — SIGNIFICANT CHANGE UP (ref 17–32)
CREAT SERPL-MCNC: 0.5 MG/DL — LOW (ref 0.7–1.5)
EGFR: 112 ML/MIN/1.73M2 — SIGNIFICANT CHANGE UP
EOSINOPHIL # BLD AUTO: 0.19 K/UL — SIGNIFICANT CHANGE UP (ref 0–0.7)
EOSINOPHIL NFR BLD AUTO: 2.2 % — SIGNIFICANT CHANGE UP (ref 0–8)
FLUAV AG NPH QL: SIGNIFICANT CHANGE UP
FLUBV AG NPH QL: SIGNIFICANT CHANGE UP
GAS PNL BLDV: 134 MMOL/L — LOW (ref 136–145)
GAS PNL BLDV: SIGNIFICANT CHANGE UP
GAS PNL BLDV: SIGNIFICANT CHANGE UP
GLUCOSE SERPL-MCNC: 87 MG/DL — SIGNIFICANT CHANGE UP (ref 70–99)
HCO3 BLDV-SCNC: 32 MMOL/L — HIGH (ref 22–29)
HCT VFR BLD CALC: 39.8 % — LOW (ref 42–52)
HCT VFR BLDA CALC: 40 % — SIGNIFICANT CHANGE UP (ref 39–51)
HGB BLD CALC-MCNC: 13.3 G/DL — SIGNIFICANT CHANGE UP (ref 12.6–17.4)
HGB BLD-MCNC: 13.5 G/DL — LOW (ref 14–18)
IMM GRANULOCYTES NFR BLD AUTO: 0.3 % — SIGNIFICANT CHANGE UP (ref 0.1–0.3)
LACTATE BLDV-MCNC: 1.1 MMOL/L — SIGNIFICANT CHANGE UP (ref 0.5–2)
LYMPHOCYTES # BLD AUTO: 1.17 K/UL — LOW (ref 1.2–3.4)
LYMPHOCYTES # BLD AUTO: 13.6 % — LOW (ref 20.5–51.1)
MCHC RBC-ENTMCNC: 30.4 PG — SIGNIFICANT CHANGE UP (ref 27–31)
MCHC RBC-ENTMCNC: 33.9 G/DL — SIGNIFICANT CHANGE UP (ref 32–37)
MCV RBC AUTO: 89.6 FL — SIGNIFICANT CHANGE UP (ref 80–94)
MONOCYTES # BLD AUTO: 0.73 K/UL — HIGH (ref 0.1–0.6)
MONOCYTES NFR BLD AUTO: 8.5 % — SIGNIFICANT CHANGE UP (ref 1.7–9.3)
NEUTROPHILS # BLD AUTO: 6.47 K/UL — SIGNIFICANT CHANGE UP (ref 1.4–6.5)
NEUTROPHILS NFR BLD AUTO: 75.2 % — SIGNIFICANT CHANGE UP (ref 42.2–75.2)
NRBC # BLD: 0 /100 WBCS — SIGNIFICANT CHANGE UP (ref 0–0)
NT-PROBNP SERPL-SCNC: 48 PG/ML — SIGNIFICANT CHANGE UP (ref 0–300)
PCO2 BLDV: 50 MMHG — SIGNIFICANT CHANGE UP (ref 42–55)
PH BLDV: 7.42 — SIGNIFICANT CHANGE UP (ref 7.32–7.43)
PLATELET # BLD AUTO: 212 K/UL — SIGNIFICANT CHANGE UP (ref 130–400)
PO2 BLDV: 34 MMHG — SIGNIFICANT CHANGE UP
POTASSIUM BLDV-SCNC: 4 MMOL/L — SIGNIFICANT CHANGE UP (ref 3.5–5.1)
POTASSIUM SERPL-MCNC: 4.4 MMOL/L — SIGNIFICANT CHANGE UP (ref 3.5–5)
POTASSIUM SERPL-SCNC: 4.4 MMOL/L — SIGNIFICANT CHANGE UP (ref 3.5–5)
PROT SERPL-MCNC: 7 G/DL — SIGNIFICANT CHANGE UP (ref 6–8)
RBC # BLD: 4.44 M/UL — LOW (ref 4.7–6.1)
RBC # FLD: 13.2 % — SIGNIFICANT CHANGE UP (ref 11.5–14.5)
RSV RNA NPH QL NAA+NON-PROBE: SIGNIFICANT CHANGE UP
SAO2 % BLDV: 60.5 % — SIGNIFICANT CHANGE UP
SARS-COV-2 RNA SPEC QL NAA+PROBE: DETECTED
SODIUM SERPL-SCNC: 136 MMOL/L — SIGNIFICANT CHANGE UP (ref 135–146)
TROPONIN T SERPL-MCNC: <0.01 NG/ML — SIGNIFICANT CHANGE UP
WBC # BLD: 8.61 K/UL — SIGNIFICANT CHANGE UP (ref 4.8–10.8)
WBC # FLD AUTO: 8.61 K/UL — SIGNIFICANT CHANGE UP (ref 4.8–10.8)

## 2022-12-12 PROCEDURE — 71275 CT ANGIOGRAPHY CHEST: CPT | Mod: 26,MA

## 2022-12-12 PROCEDURE — 93010 ELECTROCARDIOGRAM REPORT: CPT

## 2022-12-12 PROCEDURE — 71045 X-RAY EXAM CHEST 1 VIEW: CPT | Mod: 26

## 2022-12-12 PROCEDURE — 99285 EMERGENCY DEPT VISIT HI MDM: CPT | Mod: CS

## 2022-12-12 RX ORDER — DEXAMETHASONE 0.5 MG/5ML
6 ELIXIR ORAL ONCE
Refills: 0 | Status: COMPLETED | OUTPATIENT
Start: 2022-12-12 | End: 2022-12-12

## 2022-12-12 RX ORDER — BACLOFEN 100 %
10 POWDER (GRAM) MISCELLANEOUS EVERY 6 HOURS
Refills: 0 | Status: DISCONTINUED | OUTPATIENT
Start: 2022-12-12 | End: 2022-12-16

## 2022-12-12 RX ORDER — CALCIUM CARBONATE 500(1250)
1 TABLET ORAL
Refills: 0 | Status: DISCONTINUED | OUTPATIENT
Start: 2022-12-12 | End: 2022-12-16

## 2022-12-12 RX ORDER — SENNA PLUS 8.6 MG/1
2 TABLET ORAL AT BEDTIME
Refills: 0 | Status: DISCONTINUED | OUTPATIENT
Start: 2022-12-12 | End: 2022-12-16

## 2022-12-12 RX ORDER — ALBUTEROL 90 UG/1
2 AEROSOL, METERED ORAL EVERY 6 HOURS
Refills: 0 | Status: DISCONTINUED | OUTPATIENT
Start: 2022-12-12 | End: 2022-12-16

## 2022-12-12 RX ORDER — ENOXAPARIN SODIUM 100 MG/ML
40 INJECTION SUBCUTANEOUS EVERY 24 HOURS
Refills: 0 | Status: DISCONTINUED | OUTPATIENT
Start: 2022-12-12 | End: 2022-12-16

## 2022-12-12 RX ORDER — PHENOBARBITAL 60 MG
64.8 TABLET ORAL DAILY
Refills: 0 | Status: DISCONTINUED | OUTPATIENT
Start: 2022-12-12 | End: 2022-12-16

## 2022-12-12 RX ORDER — POLYETHYLENE GLYCOL 3350 17 G/17G
17 POWDER, FOR SOLUTION ORAL
Refills: 0 | Status: DISCONTINUED | OUTPATIENT
Start: 2022-12-12 | End: 2022-12-16

## 2022-12-12 RX ORDER — TAMSULOSIN HYDROCHLORIDE 0.4 MG/1
0.4 CAPSULE ORAL AT BEDTIME
Refills: 0 | Status: DISCONTINUED | OUTPATIENT
Start: 2022-12-12 | End: 2022-12-16

## 2022-12-12 RX ORDER — LACTULOSE 10 G/15ML
10 SOLUTION ORAL EVERY 6 HOURS
Refills: 0 | Status: DISCONTINUED | OUTPATIENT
Start: 2022-12-12 | End: 2022-12-16

## 2022-12-12 RX ORDER — DEXAMETHASONE 0.5 MG/5ML
6 ELIXIR ORAL DAILY
Refills: 0 | Status: DISCONTINUED | OUTPATIENT
Start: 2022-12-12 | End: 2022-12-13

## 2022-12-12 RX ORDER — PANTOPRAZOLE SODIUM 20 MG/1
40 TABLET, DELAYED RELEASE ORAL
Refills: 0 | Status: DISCONTINUED | OUTPATIENT
Start: 2022-12-12 | End: 2022-12-16

## 2022-12-12 RX ADMIN — Medication 6 MILLIGRAM(S): at 23:16

## 2022-12-12 RX ADMIN — LACTULOSE 10 GRAM(S): 10 SOLUTION ORAL at 23:16

## 2022-12-12 RX ADMIN — Medication 10 MILLIGRAM(S): at 23:16

## 2022-12-12 NOTE — ED PROVIDER NOTE - PROGRESS NOTE DETAILS
Sh  Pt reassessed  oxygen saturation 98% on 2L NC O2  will stop NC  and reassess on room air SH  Pt reassessed  oxygen saturation 90% on room air  Pt placed back on 2L NC O2 with return to oxygen saturation

## 2022-12-12 NOTE — ED ADULT NURSE NOTE - OBJECTIVE STATEMENT
hx of CP,pt is contractured, presenting for 3 days of cough. warm to touch. rectal temp 99.2. suprapubic catheter in place draining cloudy urine w/ sediment, G tube in place, insertion site c/d/i. placed on 2L 02 via NC, not on home 02.

## 2022-12-12 NOTE — H&P ADULT - ATTENDING SUPERVISION STATEMENT
[facility] called requesting a refill of the below medication which has been pended for you:     Requested Prescriptions     Pending Prescriptions Disp Refills    propafenone (RYTHMOL) 150 MG tablet 60 tablet 3     Sig: Take 1 tablet by mouth 2 times daily       Last Appointment Date: 12/21/2021  Next Appointment Date: 6/27/2022    Allergies   Allergen Reactions    Statins Other (See Comments)     Causes severe leg cramps     Tape Anna Cower Tape] Other (See Comments)     \"sticks to skin\" causes redness    Tizanidine Hcl Other (See Comments)     Having issues with her liver- elevated her enzyme level    Tramadol Other (See Comments)     \"makes me Goofy\"    Augmentin [Amoxicillin-Pot Clavulanate] Diarrhea, Nausea And Vomiting and Other (See Comments)     abd pain    Nystatin Nausea And Vomiting     Pt states \"swish and swallow\"
Resident

## 2022-12-12 NOTE — ED PROVIDER NOTE - NS ED ROS FT
Constitutional: (-) fever, (-) chills, (-) lethargy  Eyes: (-) eye pain, (-) visual changes, (-) discharge  ENMT: (-) nasal congestion, (-) sore throat. (-) neck pain (-) neck stiffness  Respiratory: (+) cough, (-) SOB, (-) ARCE, (-) respiratory distress  Cardiac: (-) chest pain, (-) palpitations  GI: (-) abdominal pain, (-) nausea, (-) vomiting, (-) diarrhea.  :  (-) dysuria, (-) hematuria, (-) frequency   MS:  (-) back pain, (-) joint pain.  Neuro:  (-) headache, (-) numbness, (-) focal weakness, (-) tingling   Skin:  (-) rash  Except as documented in the HPI,  all other systems are negative

## 2022-12-12 NOTE — H&P ADULT - HISTORY OF PRESENT ILLNESS
68 yo with PMH of CP with spastic paraplegia s/p PEG, seizure disorder, and recurrent pneumonia presenting for 3 days of cough. Patient denies recent travel, sick contacts, fever, sore throat, CP, SOB, NVD, dysuria, hematuria, melena, hematochezia, leg swelling/pain, recent surgeries, prior DVT, hormone use, hemoptysis.    In the ED:  · BP Systolic	104 mm Hg  · BP Diastolic	64 mm Hg  · Heart Rate	82 /min  · Respiration Rate (breaths/min)	18 /min  · Temp (F)	98.3 Degrees F  · Temp (C)	36.8 Degrees C  · SpO2 (%)	98 %  · O2 Delivery/Oxygen Delivery Method	nasal cannula  · Oxygen Therapy Flow (L/min)	2 L/min    Labs and VBG unremarkable  CT angio chest: 1.  No acute pulmonary embolism or acute thoracic pathology. 2.  Dilated main pulmonary artery which can be seen in the setting of pulmonary arterial hypertension.  CXR: Low lung volumes. Pulmonary vascular congestion.    Left hemidiaphragm redemonstrated. Dense material in the left colon,   presumably contrast. Correlate clinically.     68 yo with PMH of CP with spastic paraplegia s/p PEG, seizure disorder, and recurrent aspiration pneumonia presenting for 3 days of dry cough and SOB. Denies any recent travel but was exposed to recent sick contacts who were positive for COVID. Pt denies any recent fevers, chills, headaches, dizziness, nausea, vomiting, diarrhea, constipation, abdominal pain, chest pain, palpitations, new LE edema or orthopnea or PND. He is vaccinated for COVID and boosted.     In the ED:  · BP Systolic	104 mm Hg  · BP Diastolic	64 mm Hg  · Heart Rate	82 /min  · Respiration Rate (breaths/min)	18 /min  · Temp (F)	98.3 Degrees F  · Temp (C)	36.8 Degrees C  · SpO2 (%)	98 %  · O2 Delivery/Oxygen Delivery Method	nasal cannula  · Oxygen Therapy Flow (L/min)	2 L/min    Labs and VBG unremarkable  CT angio chest: 1.  No acute pulmonary embolism or acute thoracic pathology. 2.  Dilated main pulmonary artery which can be seen in the setting of pulmonary arterial hypertension.  CXR: Low lung volumes. Pulmonary vascular congestion. Left hemidiaphragm redemonstrated. Dense material in the left colon, presumably contrast. Correlate clinically.

## 2022-12-12 NOTE — ED PROVIDER NOTE - CLINICAL SUMMARY MEDICAL DECISION MAKING FREE TEXT BOX
Patient presents with cough and shortness of breath for last 2 days.  Noted to be hypoxic to 80% on room air, on 2 to 3 L nasal cannula satting normally.  Normal work of breathing.  Imaging concerning for pulmonary hypertension otherwise no PE or signs of infectious process.  Labs reviewed and stable.  EKG normal.  Patient is COVID-positive.  Given Decadron.

## 2022-12-12 NOTE — ED PROVIDER NOTE - PHYSICAL EXAMINATION
CONSTITUTIONAL: chronically ill-appearing, in NAD  SKIN: Warm dry, normal skin turgor  HEAD: NCAT  EYES: EOMI, PERRLA, no scleral icterus, conjunctiva pink  ENT: normal pharynx with no erythema or exudates  NECK: Supple; non tender. Full ROM.  CARD: RRR, no murmurs.  RESP: clear to ausculation b/l. No crackles or wheezing.  ABD: soft, non-tender, non-distended, no rebound or guarding.  EXT: upper and lower extremities contracted;   NEURO: AAOX3  PSYCH: Cooperative, appropriate.

## 2022-12-12 NOTE — ED ADULT NURSE REASSESSMENT NOTE - NS ED NURSE REASSESS COMMENT FT1
pt presented for 1 day of dizziness, sensation of feeling offbalance, associated w/ chest pressure, nausea and vomiting. Pt denies recent illness, SOB, diarrhea. Denies numbness, tingling, weakness. is aaox3, breathing even and unlabored, PIV placed and labs obtained. Pending further recs.

## 2022-12-12 NOTE — ED PROVIDER NOTE - CARE PLAN
1 Principal Discharge DX:	Acute respiratory failure with hypoxia   Principal Discharge DX:	Acute respiratory failure with hypoxia  Secondary Diagnosis:	Pulmonary hypertension  Secondary Diagnosis:	COVID-19

## 2022-12-12 NOTE — ED ADULT TRIAGE NOTE - CHIEF COMPLAINT QUOTE
pt having cough and congestion for a few days. sent to UC and sent here for eval. since they do not have xray there. 92% on RA

## 2022-12-12 NOTE — H&P ADULT - NSHPREVIEWOFSYSTEMS_GEN_ALL_CORE
REVIEW OF SYSTEMS:    CONSTITUTIONAL: No weakness, fevers or chills  EYES/ENT: No visual changes;  No vertigo or throat pain   NECK: No pain or stiffness  RESPIRATORY: + cough and sob. no hemoptysis  CARDIOVASCULAR: No chest pain or palpitations  GASTROINTESTINAL: No abdominal or epigastric pain. No nausea, vomiting, or hematemesis; No diarrhea or constipation. No melena or hematochezia.  GENITOURINARY: No dysuria, frequency or hematuria  NEUROLOGICAL: No numbness or weakness  SKIN: No itching, rashes

## 2022-12-12 NOTE — H&P ADULT - ASSESSMENT
66 yo with PMH of CP with spastic paraplegia s/p PEG, seizure disorder, and recurrent pneumonia presenting for 3 days of cough. Patient denies recent travel, sick contacts, fever, sore throat, CP, SOB, NVD, dysuria, hematuria, melena, hematochezia, leg swelling/pain, recent surgeries, prior DVT, hormone use, hemoptysis.    Labs and VBG unremarkable  CT angio chest: 1.  No acute pulmonary embolism or acute thoracic pathology. 2.  Dilated main pulmonary artery which can be seen in the setting of pulmonary arterial hypertension.  CXR: Low lung volumes. Pulmonary vascular congestion.    Left hemidiaphragm redemonstrated. Dense material in the left colon,   presumably contrast. Correlate clinically.      #AHRF secondary to COVID  #h/o recurrent aspiration PNA  - COVID PCR positive  - isolation precautions  - satting 88-89% on RA, requiring supplemental oxygen  - CXR: pulmonary vascular congestion  - CTA chest: no PE. dilated main pulmonary artery which can be seen in setting of PAH  - Follow up inflammatory markers: ferritin, ESR, CRP, procalc, d-dimer  - ID consult for possible DB  - start decadron 6mg IV q24 for 10 days  - DVT prophylaxis per protocol    #CP with spastic paraplegia s/p PEG  #seizure disorder    #Misc:   66 yo with PMH of CP with spastic paraplegia s/p PEG, seizure disorder, and recurrent aspiration pneumonia presenting for 3 days of dry cough and SOB.    #AHRF secondary to COVID  #h/o recurrent aspiration PNA  #possible pulmonary arterial hypertension  - COVID PCR positive  - isolation precautions  - satting 88-89% on RA, requiring supplemental oxygen  - CXR: pulmonary vascular congestion  - CTA chest: no PE. dilated main pulmonary artery which can be seen in setting of PAH  - albuterol PRN  - Follow up inflammatory markers: ferritin, ESR, CRP, procalc, d-dimer  - ID consult for possible DB  - start decadron 6mg IV q24 for 10 days  - DVT prophylaxis per protocol  - obtain echo for possible PAH seen on CTA, consider pulm consult  - aspiration and seizure precautions    #CP with spastic paraplegia s/p PEG  #seizure disorder  - continue with home phenobarbital, baclofen    #BPH  - continue with home flomax    #Misc:  #DVT PPX: lovenox  #GI PPX: protonix  #Diet: pureed, honey thick  #Activity: bedrest  #Dispo: admit to medicine

## 2022-12-12 NOTE — ED PROVIDER NOTE - ATTENDING CONTRIBUTION TO CARE
no
66 yo my h/o cerebral palsy, suprapubic catheter, g-tube, seizure, spastic paraplegia wheelchair bound, bph, kidney stones, Recurrence pneumonia presenting today for shortness breath and cough for last 2 days.  Denies any fevers vomiting or diarrhea.  States last bowel movement was couple days ago, has history of chronic constipation, has been passing gas.  Had his PEG tube replaced 2 days ago.    vss, nontoxic, well appearing, pink conj, anicteric, MMM, neck supple, CTAB, RRR, equal radial pulses bilat, abd soft/nt/nd, no cva tend. no calves tend, no edema, no fnd- oriented x2. no rashes.

## 2022-12-12 NOTE — ED PROVIDER NOTE - OBJECTIVE STATEMENT
68 yo with PMH of CP with spastic paraplegia s/p PEG, seizure disorder, and recurrent pneumonia presenting for 3 days of cough. Patient denies recent travel, sick contacts, fever, sore throat, CP, SOB, NVD, dysuria, hematuria, melena, hematochezia, leg swelling/pain, recent surgeries, prior DVT, hormone use, hemoptysis.

## 2022-12-12 NOTE — H&P ADULT - NSHPLABSRESULTS_GEN_ALL_CORE
13.5   8.61  )-----------( 212      ( 12 Dec 2022 16:04 )             39.8       12-12    136  |  96<L>  |  7<L>  ----------------------------<  87  4.4   |  29  |  0.5<L>    Ca    9.4      12 Dec 2022 16:04    TPro  7.0  /  Alb  4.3  /  TBili  0.4  /  DBili  x   /  AST  19  /  ALT  12  /  AlkPhos  81  12-12                      Lactate Trend      CARDIAC MARKERS ( 12 Dec 2022 16:04 )  x     / <0.01 ng/mL / x     / x     / x            CAPILLARY BLOOD GLUCOSE            Culture Results:   >=3 organisms. Probable collection contamination. (11-21 @ 16:31)      < from: CT Angio Chest PE Protocol w/ IV Cont (12.12.22 @ 18:23) >      IMPRESSION:  1.  No acute pulmonary embolism or acute thoracic pathology.  2.  Dilated main pulmonary artery which can be seen in the setting of   pulmonary arterial hypertension.    --- End of Report ---            ELI ADDISON MD; Attending Radiologist    < end of copied text >

## 2022-12-12 NOTE — H&P ADULT - NSHPPHYSICALEXAM_GEN_ALL_CORE
T(C): 36.7 (12-12-22 @ 17:29), Max: 37.3 (12-12-22 @ 15:47)  HR: 89 (12-12-22 @ 17:29) (82 - 89)  BP: 120/53 (12-12-22 @ 17:29) (104/64 - 120/53)  RR: 18 (12-12-22 @ 17:29) (18 - 18)  SpO2: 96% (12-12-22 @ 17:29) (96% - 100%)    PHYSICAL EXAM:  GENERAL: patient appears well, dysarthric  EYES: sclera clear, no exudates  ENMT: poor dentition  NECK: supple, soft, no thyromegaly noted  LUNGS: bilateral rhonchi appreciated  HEART: soft S1/S2, regular rate and rhythm, no murmurs noted, no lower extremity edema  GASTROINTESTINAL: abdomen is soft, nontender, nondistended, normoactive bowel sounds, no palpable masses. PEG tube present  INTEGUMENT: good skin turgor, no lesions noted  MUSCULOSKELETAL: extremities contracted  NEUROLOGIC: awake, alert, oriented x3, extremities contracted  PSYCHIATRIC: mood is good, affect is congruent, linear and logical thought process  HEME/LYMPH: no palpable supraclavicular nodules, no obvious ecchymosis or petechiae

## 2022-12-13 LAB
ALBUMIN SERPL ELPH-MCNC: 4.4 G/DL — SIGNIFICANT CHANGE UP (ref 3.5–5.2)
ALP SERPL-CCNC: 80 U/L — SIGNIFICANT CHANGE UP (ref 30–115)
ALT FLD-CCNC: 12 U/L — SIGNIFICANT CHANGE UP (ref 0–41)
ANION GAP SERPL CALC-SCNC: 15 MMOL/L — HIGH (ref 7–14)
AST SERPL-CCNC: 18 U/L — SIGNIFICANT CHANGE UP (ref 0–41)
BASOPHILS # BLD AUTO: 0.01 K/UL — SIGNIFICANT CHANGE UP (ref 0–0.2)
BASOPHILS NFR BLD AUTO: 0.1 % — SIGNIFICANT CHANGE UP (ref 0–1)
BILIRUB SERPL-MCNC: 0.5 MG/DL — SIGNIFICANT CHANGE UP (ref 0.2–1.2)
BUN SERPL-MCNC: 15 MG/DL — SIGNIFICANT CHANGE UP (ref 10–20)
CALCIUM SERPL-MCNC: 9.3 MG/DL — SIGNIFICANT CHANGE UP (ref 8.4–10.5)
CHLORIDE SERPL-SCNC: 99 MMOL/L — SIGNIFICANT CHANGE UP (ref 98–110)
CO2 SERPL-SCNC: 23 MMOL/L — SIGNIFICANT CHANGE UP (ref 17–32)
CREAT SERPL-MCNC: 0.6 MG/DL — LOW (ref 0.7–1.5)
CRP SERPL-MCNC: 22.9 MG/L — HIGH
D DIMER BLD IA.RAPID-MCNC: <150 NG/ML DDU — SIGNIFICANT CHANGE UP
EGFR: 106 ML/MIN/1.73M2 — SIGNIFICANT CHANGE UP
EOSINOPHIL # BLD AUTO: 0 K/UL — SIGNIFICANT CHANGE UP (ref 0–0.7)
EOSINOPHIL NFR BLD AUTO: 0 % — SIGNIFICANT CHANGE UP (ref 0–8)
ERYTHROCYTE [SEDIMENTATION RATE] IN BLOOD: 41 MM/HR — HIGH (ref 0–10)
FERRITIN SERPL-MCNC: 72 NG/ML — SIGNIFICANT CHANGE UP (ref 30–400)
GLUCOSE SERPL-MCNC: 173 MG/DL — HIGH (ref 70–99)
HCT VFR BLD CALC: 37.6 % — LOW (ref 42–52)
HGB BLD-MCNC: 13.1 G/DL — LOW (ref 14–18)
IMM GRANULOCYTES NFR BLD AUTO: 0.3 % — SIGNIFICANT CHANGE UP (ref 0.1–0.3)
LYMPHOCYTES # BLD AUTO: 0.49 K/UL — LOW (ref 1.2–3.4)
LYMPHOCYTES # BLD AUTO: 7.1 % — LOW (ref 20.5–51.1)
MAGNESIUM SERPL-MCNC: 2.1 MG/DL — SIGNIFICANT CHANGE UP (ref 1.8–2.4)
MCHC RBC-ENTMCNC: 30.8 PG — SIGNIFICANT CHANGE UP (ref 27–31)
MCHC RBC-ENTMCNC: 34.8 G/DL — SIGNIFICANT CHANGE UP (ref 32–37)
MCV RBC AUTO: 88.5 FL — SIGNIFICANT CHANGE UP (ref 80–94)
MONOCYTES # BLD AUTO: 0.08 K/UL — LOW (ref 0.1–0.6)
MONOCYTES NFR BLD AUTO: 1.2 % — LOW (ref 1.7–9.3)
NEUTROPHILS # BLD AUTO: 6.26 K/UL — SIGNIFICANT CHANGE UP (ref 1.4–6.5)
NEUTROPHILS NFR BLD AUTO: 91.3 % — HIGH (ref 42.2–75.2)
NRBC # BLD: 0 /100 WBCS — SIGNIFICANT CHANGE UP (ref 0–0)
PLATELET # BLD AUTO: 210 K/UL — SIGNIFICANT CHANGE UP (ref 130–400)
POTASSIUM SERPL-MCNC: 4 MMOL/L — SIGNIFICANT CHANGE UP (ref 3.5–5)
POTASSIUM SERPL-SCNC: 4 MMOL/L — SIGNIFICANT CHANGE UP (ref 3.5–5)
PROT SERPL-MCNC: 6.9 G/DL — SIGNIFICANT CHANGE UP (ref 6–8)
RBC # BLD: 4.25 M/UL — LOW (ref 4.7–6.1)
RBC # FLD: 13.2 % — SIGNIFICANT CHANGE UP (ref 11.5–14.5)
SODIUM SERPL-SCNC: 137 MMOL/L — SIGNIFICANT CHANGE UP (ref 135–146)
WBC # BLD: 6.86 K/UL — SIGNIFICANT CHANGE UP (ref 4.8–10.8)
WBC # FLD AUTO: 6.86 K/UL — SIGNIFICANT CHANGE UP (ref 4.8–10.8)

## 2022-12-13 PROCEDURE — 99223 1ST HOSP IP/OBS HIGH 75: CPT

## 2022-12-13 PROCEDURE — 93306 TTE W/DOPPLER COMPLETE: CPT | Mod: 26

## 2022-12-13 RX ORDER — REMDESIVIR 5 MG/ML
INJECTION INTRAVENOUS
Refills: 0 | Status: DISCONTINUED | OUTPATIENT
Start: 2022-12-13 | End: 2022-12-16

## 2022-12-13 RX ORDER — REMDESIVIR 5 MG/ML
200 INJECTION INTRAVENOUS EVERY 24 HOURS
Refills: 0 | Status: COMPLETED | OUTPATIENT
Start: 2022-12-13 | End: 2022-12-13

## 2022-12-13 RX ORDER — GUAIFENESIN/DEXTROMETHORPHAN 600MG-30MG
10 TABLET, EXTENDED RELEASE 12 HR ORAL THREE TIMES A DAY
Refills: 0 | Status: DISCONTINUED | OUTPATIENT
Start: 2022-12-13 | End: 2022-12-16

## 2022-12-13 RX ORDER — REMDESIVIR 5 MG/ML
100 INJECTION INTRAVENOUS EVERY 24 HOURS
Refills: 0 | Status: DISCONTINUED | OUTPATIENT
Start: 2022-12-14 | End: 2022-12-16

## 2022-12-13 RX ADMIN — SENNA PLUS 2 TABLET(S): 8.6 TABLET ORAL at 21:36

## 2022-12-13 RX ADMIN — Medication 10 MILLIGRAM(S): at 11:59

## 2022-12-13 RX ADMIN — Medication 1 DROP(S): at 21:36

## 2022-12-13 RX ADMIN — ENOXAPARIN SODIUM 40 MILLIGRAM(S): 100 INJECTION SUBCUTANEOUS at 11:58

## 2022-12-13 RX ADMIN — Medication 1 DROP(S): at 14:52

## 2022-12-13 RX ADMIN — LACTULOSE 10 GRAM(S): 10 SOLUTION ORAL at 19:05

## 2022-12-13 RX ADMIN — POLYETHYLENE GLYCOL 3350 17 GRAM(S): 17 POWDER, FOR SOLUTION ORAL at 19:05

## 2022-12-13 RX ADMIN — Medication 1 TABLET(S): at 19:05

## 2022-12-13 RX ADMIN — TAMSULOSIN HYDROCHLORIDE 0.4 MILLIGRAM(S): 0.4 CAPSULE ORAL at 21:36

## 2022-12-13 RX ADMIN — POLYETHYLENE GLYCOL 3350 17 GRAM(S): 17 POWDER, FOR SOLUTION ORAL at 05:32

## 2022-12-13 RX ADMIN — LACTULOSE 10 GRAM(S): 10 SOLUTION ORAL at 11:59

## 2022-12-13 RX ADMIN — Medication 1 DROP(S): at 05:32

## 2022-12-13 RX ADMIN — Medication 1 TABLET(S): at 05:33

## 2022-12-13 RX ADMIN — REMDESIVIR 200 MILLIGRAM(S): 5 INJECTION INTRAVENOUS at 19:05

## 2022-12-13 RX ADMIN — Medication 600 MILLIGRAM(S): at 05:31

## 2022-12-13 RX ADMIN — Medication 10 MILLIGRAM(S): at 05:32

## 2022-12-13 RX ADMIN — PANTOPRAZOLE SODIUM 40 MILLIGRAM(S): 20 TABLET, DELAYED RELEASE ORAL at 05:31

## 2022-12-13 RX ADMIN — Medication 64.8 MILLIGRAM(S): at 14:45

## 2022-12-13 RX ADMIN — LACTULOSE 10 GRAM(S): 10 SOLUTION ORAL at 05:31

## 2022-12-13 RX ADMIN — Medication 1 TABLET(S): at 11:59

## 2022-12-13 RX ADMIN — Medication 10 MILLIGRAM(S): at 19:05

## 2022-12-13 RX ADMIN — Medication 6 MILLIGRAM(S): at 05:31

## 2022-12-13 NOTE — PATIENT PROFILE ADULT - FALL HARM RISK - HARM RISK INTERVENTIONS
Assistance with ambulation/Assistance OOB with selected safe patient handling equipment/Communicate Risk of Fall with Harm to all staff/Discuss with provider need for PT consult/Monitor gait and stability/Reinforce activity limits and safety measures with patient and family/Tailored Fall Risk Interventions/Visual Cue: Yellow wristband and red socks/Bed in lowest position, wheels locked, appropriate side rails in place/Call bell, personal items and telephone in reach/Instruct patient to call for assistance before getting out of bed or chair/Non-slip footwear when patient is out of bed/[facility] to call system/Physically safe environment - no spills, clutter or unnecessary equipment/Purposeful Proactive Rounding/Room/bathroom lighting operational, light cord in reach

## 2022-12-13 NOTE — PROGRESS NOTE ADULT - ATTENDING COMMENTS
66 yo with PMH of CP with spastic paraplegia s/p PEG, seizure disorder, and recurrent aspiration pneumonia presenting for 3 days of dry cough and SOB.    #AHRF secondary to COVID  #h/o recurrent aspiration PNA  Desaturated on admission to 88% requiring O2  -ID consulted: plan for documentation of O2 sat on RA( If > 94%--> RDV x 3 days, If < 94%--> RDV x 5 days + Dex 6mg PO)  - Supportive care    #CP with spastic paraplegia s/p PEG  #seizure disorder  - continue with home phenobarbital, baclofen    #BPH  - continue with home flomax    DVT PPX, Lovenox    #Progress Note Handoff  Pending (specify): Confirm room air O2 saturation for duration of tx  Family discussion: Team d/w family regarding tx for COVID  Disposition: Home 68 yo with PMH of CP with spastic paraplegia s/p PEG, seizure disorder, and recurrent aspiration pneumonia presenting for 3 days of dry cough and SOB.    #AHRF secondary to COVID  #h/o recurrent aspiration PNA  Desaturated on admission to 88% requiring O2, but today has been stable on RA saturating 95-96%  - RDV x 3 d  - Supportive care  - ADP 48 h  - Monitor O2 saturations    #CP with spastic paraplegia s/p PEG  #seizure disorder  - continue with home phenobarbital, baclofen    #BPH  - continue with home flomax    DVT PPX, Lovenox    #Progress Note Handoff  Pending (specify): Confirm room air O2 saturation for duration of tx  Family discussion: Team d/w family regarding tx for COVID  Disposition: Home .

## 2022-12-13 NOTE — CONSULT NOTE ADULT - SUBJECTIVE AND OBJECTIVE BOX
HAWATISH WAYNE  67y, Male  Allergy: No Known Allergies      CHIEF COMPLAINT:   COVID (12 Dec 2022 21:26)      LOS  1d    HPI  HPI:  68 yo with PMH of CP with spastic paraplegia s/p PEG, seizure disorder, and recurrent aspiration pneumonia presenting for 3 days of dry cough and SOB. Denies any recent travel but was exposed to recent sick contacts who were positive for COVID. Pt denies any recent fevers, chills, headaches, dizziness, nausea, vomiting, diarrhea, constipation, abdominal pain, chest pain, palpitations, new LE edema or orthopnea or PND. He is vaccinated for COVID and boosted.     In the ED:  · BP Systolic	104 mm Hg  · BP Diastolic	64 mm Hg  · Heart Rate	82 /min  · Respiration Rate (breaths/min)	18 /min  · Temp (F)	98.3 Degrees F  · Temp (C)	36.8 Degrees C  · SpO2 (%)	98 %  · O2 Delivery/Oxygen Delivery Method	nasal cannula  · Oxygen Therapy Flow (L/min)	2 L/min    Labs and VBG unremarkable  CT angio chest: 1.  No acute pulmonary embolism or acute thoracic pathology. 2.  Dilated main pulmonary artery which can be seen in the setting of pulmonary arterial hypertension.  CXR: Low lung volumes. Pulmonary vascular congestion. Left hemidiaphragm redemonstrated. Dense material in the left colon, presumably contrast. Correlate clinically.     (12 Dec 2022 21:26)      INFECTIOUS DISEASE HISTORY:  ID consulted for  COVID19, satting well on RA       PMH  PAST MEDICAL & SURGICAL HISTORY:  BPH (benign prostatic hyperplasia)      Cerebral palsy      Seizure  last seizure &gt;10 years ago      Osteoporosis      Spastic quadriplegia      Urinary calculi      Urinary retention      Asthma      S/P percutaneous endoscopic gastrostomy (PEG) tube placement      H/O cystoscopy      Suprapubic catheter          FAMILY HISTORY  No pertinent family history in first degree relatives    Family history unknown        SOCIAL HISTORY  Social History:  not available (18 Jun 2021 01:02)        ROS  ***    VITALS:  T(F): 98.1, Max: 99.2 (12-12-22 @ 15:47)  HR: 85  BP: 133/65  RR: 18Vital Signs Last 24 Hrs  T(C): 36.7 (13 Dec 2022 05:00), Max: 37.3 (12 Dec 2022 15:47)  T(F): 98.1 (13 Dec 2022 05:00), Max: 99.2 (12 Dec 2022 15:47)  HR: 85 (13 Dec 2022 05:00) (82 - 89)  BP: 133/65 (13 Dec 2022 05:00) (104/64 - 133/65)  BP(mean): 87 (13 Dec 2022 01:20) (87 - 87)  RR: 18 (13 Dec 2022 05:00) (18 - 19)  SpO2: 94% (13 Dec 2022 01:20) (94% - 100%)    Parameters below as of 13 Dec 2022 05:00  Patient On (Oxygen Delivery Method): nasal cannula        PHYSICAL EXAM:  ***    TESTS & MEASUREMENTS:                        13.1   6.86  )-----------( 210      ( 13 Dec 2022 08:46 )             37.6     12-12    136  |  96<L>  |  7<L>  ----------------------------<  87  4.4   |  29  |  0.5<L>    Ca    9.4      12 Dec 2022 16:04    TPro  7.0  /  Alb  4.3  /  TBili  0.4  /  DBili  x   /  AST  19  /  ALT  12  /  AlkPhos  81  12-12      LIVER FUNCTIONS - ( 12 Dec 2022 16:04 )  Alb: 4.3 g/dL / Pro: 7.0 g/dL / ALK PHOS: 81 U/L / ALT: 12 U/L / AST: 19 U/L / GGT: x               Culture - Urine (collected 11-21-22 @ 16:31)  Source: Clean Catch Clean Catch (Midstream)  Final Report (11-23-22 @ 01:29):    >=3 organisms. Probable collection contamination.        Blood Gas Venous - Lactate: 1.10 mmol/L (12-12-22 @ 14:20)      INFECTIOUS DISEASES TESTING      INFLAMMATORY MARKERS  Sedimentation Rate, Erythrocyte: 41 mm/Hr (12-13-22 @ 00:00)      RADIOLOGY & ADDITIONAL TESTS:  I have personally reviewed the last Chest xray  CXR      CT  CT Angio Chest PE Protocol w/ IV Cont:   ACC: [id number] EXAM:  CT ANGIO CHEST PULM ART [facility]                          PROCEDURE DATE:  12/12/2022          INTERPRETATION:  CLINICAL HISTORY / REASON FOR EXAM: Hypoxia.    TECHNIQUE: Multislice helical sections were obtained from the thoracic  inlet to the lung bases during rapid administration of 60cc Optiray 320   intravenous contrast using a CTA protocol. Thin sections were   reconstructed through the pulmonary vasculature. Sagittal and coronal   reformatted images were acquired, as well as MIP reconstructed images.    COMPARISON: 3/24/2020    FINDINGS:  Pulmonary embolus: No CT evidence of acute pulmonary embolus.    Lungs/Airways/Pleura: No acute consolidation, pleural effusion or   pneumothorax. Atelectatic changes are noted atthe bases    Pulmonary nodules/masses: No suspicious pulmonary nodules or masses.    Heart/Vascular:    Dilated pulmonary artery which can be seen in setting of pulmonary   arterial hypertension    Mediastinum/Lymph nodes: Unremarkable. No thoracic lymphadenopathy.    Visualized upper abdomen: No focal abnormality.    Bones/soft tissues: Degenerative changes    IMPRESSION:  1.  No acute pulmonary embolism or acute thoracic pathology.  2.  Dilated main pulmonary artery which can be seen in the setting of   pulmonary arterial hypertension.    --- End of Report ---            ELI ADDISON MD; Attending Radiologist  This document has been electronically signed. Dec 12 2022  7:22PM (12-12-22 @ 18:23)      CARDIOLOGY TESTING      MEDICATIONS  artificial  tears Solution 1 Both EYES three times a day  baclofen 10 Oral every 6 hours  calcium carbonate   1250 mG (OsCal) 1 Oral two times a day  dexAMETHasone  Injectable 6 IV Push daily  enoxaparin Injectable 40 SubCutaneous every 24 hours  guaiFENesin  Oral every 12 hours  lactulose Syrup 10 Oral every 6 hours  multivitamin 1 Oral daily  pantoprazole    Tablet 40 Oral before breakfast  PHENobarbital 64.8 Oral daily  polyethylene glycol 3350 17 Oral two times a day  senna 2 Oral at bedtime  tamsulosin 0.4 Oral at bedtime        ANTIBIOTICS:      ALLERGIES:  No Known Allergies         HAWATISH SCHUSTER  67y, Male  Allergy: No Known Allergies      CHIEF COMPLAINT:   COVID (12 Dec 2022 21:26)      LOS  1d    HPI  HPI:  68 yo with PMH of CP with spastic paraplegia s/p PEG, seizure disorder, and recurrent aspiration pneumonia presenting for 3 days of dry cough and SOB. Denies any recent travel but was exposed to recent sick contacts who were positive for COVID. Pt denies any recent fevers, chills, headaches, dizziness, nausea, vomiting, diarrhea, constipation, abdominal pain, chest pain, palpitations, new LE edema or orthopnea or PND. He is vaccinated for COVID and boosted.     In the ED:  · BP Systolic	104 mm Hg  · BP Diastolic	64 mm Hg  · Heart Rate	82 /min  · Respiration Rate (breaths/min)	18 /min  · Temp (F)	98.3 Degrees F  · Temp (C)	36.8 Degrees C  · SpO2 (%)	98 %  · O2 Delivery/Oxygen Delivery Method	nasal cannula  · Oxygen Therapy Flow (L/min)	2 L/min    Labs and VBG unremarkable  CT angio chest: 1.  No acute pulmonary embolism or acute thoracic pathology. 2.  Dilated main pulmonary artery which can be seen in the setting of pulmonary arterial hypertension.  CXR: Low lung volumes. Pulmonary vascular congestion. Left hemidiaphragm redemonstrated. Dense material in the left colon, presumably contrast. Correlate clinically.     (12 Dec 2022 21:26)      INFECTIOUS DISEASE HISTORY:  ID consulted for  COVID19, satting well on RA       PMH  PAST MEDICAL & SURGICAL HISTORY:  BPH (benign prostatic hyperplasia)      Cerebral palsy      Seizure  last seizure &gt;10 years ago      Osteoporosis      Spastic quadriplegia      Urinary calculi      Urinary retention      Asthma      S/P percutaneous endoscopic gastrostomy (PEG) tube placement      H/O cystoscopy      Suprapubic catheter          FAMILY HISTORY  No pertinent family history in first degree relatives    Family history unknown        SOCIAL HISTORY  Social History:  not available (18 Jun 2021 01:02)        ROS  unable to obtain history secondary to patient's mental status and/or sedation     VITALS:  T(F): 98.1, Max: 99.2 (12-12-22 @ 15:47)  HR: 85  BP: 133/65  RR: 18Vital Signs Last 24 Hrs  T(C): 36.7 (13 Dec 2022 05:00), Max: 37.3 (12 Dec 2022 15:47)  T(F): 98.1 (13 Dec 2022 05:00), Max: 99.2 (12 Dec 2022 15:47)  HR: 85 (13 Dec 2022 05:00) (82 - 89)  BP: 133/65 (13 Dec 2022 05:00) (104/64 - 133/65)  BP(mean): 87 (13 Dec 2022 01:20) (87 - 87)  RR: 18 (13 Dec 2022 05:00) (18 - 19)  SpO2: 94% (13 Dec 2022 01:20) (94% - 100%)    Parameters below as of 13 Dec 2022 05:00  Patient On (Oxygen Delivery Method): nasal cannula        PHYSICAL EXAM:  Gen: NAD, resting in bed chronically ill appearing   HEENT: Normocephalic, atraumatic  Neck: supple, no lymphadenopathy  CV: Regular rate & regular rhythm  Lungs: decreased BS at bases, no fremitus  Abdomen: Soft, BS present peg  Ext: Warm, well perfused  Neuro: non focal, awake  Skin: no rash, no erythema  Lines: no phlebitis     TESTS & MEASUREMENTS:                        13.1   6.86  )-----------( 210      ( 13 Dec 2022 08:46 )             37.6     12-12    136  |  96<L>  |  7<L>  ----------------------------<  87  4.4   |  29  |  0.5<L>    Ca    9.4      12 Dec 2022 16:04    TPro  7.0  /  Alb  4.3  /  TBili  0.4  /  DBili  x   /  AST  19  /  ALT  12  /  AlkPhos  81  12-12      LIVER FUNCTIONS - ( 12 Dec 2022 16:04 )  Alb: 4.3 g/dL / Pro: 7.0 g/dL / ALK PHOS: 81 U/L / ALT: 12 U/L / AST: 19 U/L / GGT: x               Culture - Urine (collected 11-21-22 @ 16:31)  Source: Clean Catch Clean Catch (Midstream)  Final Report (11-23-22 @ 01:29):    >=3 organisms. Probable collection contamination.        Blood Gas Venous - Lactate: 1.10 mmol/L (12-12-22 @ 14:20)      INFECTIOUS DISEASES TESTING      INFLAMMATORY MARKERS  Sedimentation Rate, Erythrocyte: 41 mm/Hr (12-13-22 @ 00:00)      RADIOLOGY & ADDITIONAL TESTS:  I have personally reviewed the last Chest xray  CXR      CT  CT Angio Chest PE Protocol w/ IV Cont:   ACC: [id number] EXAM:  CT ANGIO CHEST PULM [facility]                          PROCEDURE DATE:  12/12/2022          INTERPRETATION:  CLINICAL HISTORY / REASON FOR EXAM: Hypoxia.    TECHNIQUE: Multislice helical sections were obtained from the thoracic  inlet to the lung bases during rapid administration of 60cc Optiray 320   intravenous contrast using a CTA protocol. Thin sections were   reconstructed through the pulmonary vasculature. Sagittal and coronal   reformatted images were acquired, as well as MIP reconstructed images.    COMPARISON: 3/24/2020    FINDINGS:  Pulmonary embolus: No CT evidence of acute pulmonary embolus.    Lungs/Airways/Pleura: No acute consolidation, pleural effusion or   pneumothorax. Atelectatic changes are noted atthe bases    Pulmonary nodules/masses: No suspicious pulmonary nodules or masses.    Heart/Vascular:    Dilated pulmonary artery which can be seen in setting of pulmonary   arterial hypertension    Mediastinum/Lymph nodes: Unremarkable. No thoracic lymphadenopathy.    Visualized upper abdomen: No focal abnormality.    Bones/soft tissues: Degenerative changes    IMPRESSION:  1.  No acute pulmonary embolism or acute thoracic pathology.  2.  Dilated main pulmonary artery which can be seen in the setting of   pulmonary arterial hypertension.    --- End of Report ---            ELI ADDISON MD; Attending Radiologist  This document has been electronically signed. Dec 12 2022  7:22PM (12-12-22 @ 18:23)      CARDIOLOGY TESTING      MEDICATIONS  artificial  tears Solution 1 Both EYES three times a day  baclofen 10 Oral every 6 hours  calcium carbonate   1250 mG (OsCal) 1 Oral two times a day  dexAMETHasone  Injectable 6 IV Push daily  enoxaparin Injectable 40 SubCutaneous every 24 hours  guaiFENesin  Oral every 12 hours  lactulose Syrup 10 Oral every 6 hours  multivitamin 1 Oral daily  pantoprazole    Tablet 40 Oral before breakfast  PHENobarbital 64.8 Oral daily  polyethylene glycol 3350 17 Oral two times a day  senna 2 Oral at bedtime  tamsulosin 0.4 Oral at bedtime        ANTIBIOTICS:      ALLERGIES:  No Known Allergies

## 2022-12-13 NOTE — CONSULT NOTE ADULT - ASSESSMENT
ASSESSMENT  66 yo with PMH of CP with spastic paraplegia s/p PEG, seizure disorder, and recurrent aspiration pneumonia presenting for 3 days of dry cough and SOB.     IMPRESSION  #COVID19, on NC    CT 1.  No acute pulmonary embolism or acute thoracic pathology.  2.  Dilated main pulmonary artery which can be seen in the setting of pulmonary arterial hypertension.  #CP  #Sepsis ruled out on admission   Creatinine, Serum: 0.5 (12-12-22 @ 16:04)    Weight (kg): 68 (11-21-22 @ 13:49)    RECOMMENDATIONS  - Please document O2 on RA     - If > 94%--> RDV x 3 days     - If < 94%--> RDV x 5 days + Dex 6mg PO    If any questions, please call or send a message on [employer] Teams  Please continue to update ID with any pertinent new laboratory or radiographic findings  #[id number]

## 2022-12-14 LAB
ALBUMIN SERPL ELPH-MCNC: 4.2 G/DL — SIGNIFICANT CHANGE UP (ref 3.5–5.2)
ALP SERPL-CCNC: 75 U/L — SIGNIFICANT CHANGE UP (ref 30–115)
ALT FLD-CCNC: 10 U/L — SIGNIFICANT CHANGE UP (ref 0–41)
ANION GAP SERPL CALC-SCNC: 13 MMOL/L — SIGNIFICANT CHANGE UP (ref 7–14)
AST SERPL-CCNC: 17 U/L — SIGNIFICANT CHANGE UP (ref 0–41)
BASOPHILS # BLD AUTO: 0.02 K/UL — SIGNIFICANT CHANGE UP (ref 0–0.2)
BASOPHILS NFR BLD AUTO: 0.3 % — SIGNIFICANT CHANGE UP (ref 0–1)
BILIRUB SERPL-MCNC: <0.2 MG/DL — SIGNIFICANT CHANGE UP (ref 0.2–1.2)
BUN SERPL-MCNC: 18 MG/DL — SIGNIFICANT CHANGE UP (ref 10–20)
CALCIUM SERPL-MCNC: 8.8 MG/DL — SIGNIFICANT CHANGE UP (ref 8.4–10.5)
CHLORIDE SERPL-SCNC: 102 MMOL/L — SIGNIFICANT CHANGE UP (ref 98–110)
CO2 SERPL-SCNC: 26 MMOL/L — SIGNIFICANT CHANGE UP (ref 17–32)
CREAT SERPL-MCNC: 0.7 MG/DL — SIGNIFICANT CHANGE UP (ref 0.7–1.5)
EGFR: 101 ML/MIN/1.73M2 — SIGNIFICANT CHANGE UP
EOSINOPHIL # BLD AUTO: 0.09 K/UL — SIGNIFICANT CHANGE UP (ref 0–0.7)
EOSINOPHIL NFR BLD AUTO: 1.4 % — SIGNIFICANT CHANGE UP (ref 0–8)
GLUCOSE SERPL-MCNC: 93 MG/DL — SIGNIFICANT CHANGE UP (ref 70–99)
HCT VFR BLD CALC: 37.2 % — LOW (ref 42–52)
HGB BLD-MCNC: 12.5 G/DL — LOW (ref 14–18)
IMM GRANULOCYTES NFR BLD AUTO: 0.3 % — SIGNIFICANT CHANGE UP (ref 0.1–0.3)
LYMPHOCYTES # BLD AUTO: 1.81 K/UL — SIGNIFICANT CHANGE UP (ref 1.2–3.4)
LYMPHOCYTES # BLD AUTO: 27.2 % — SIGNIFICANT CHANGE UP (ref 20.5–51.1)
MAGNESIUM SERPL-MCNC: 2.1 MG/DL — SIGNIFICANT CHANGE UP (ref 1.8–2.4)
MCHC RBC-ENTMCNC: 30.5 PG — SIGNIFICANT CHANGE UP (ref 27–31)
MCHC RBC-ENTMCNC: 33.6 G/DL — SIGNIFICANT CHANGE UP (ref 32–37)
MCV RBC AUTO: 90.7 FL — SIGNIFICANT CHANGE UP (ref 80–94)
MONOCYTES # BLD AUTO: 0.83 K/UL — HIGH (ref 0.1–0.6)
MONOCYTES NFR BLD AUTO: 12.5 % — HIGH (ref 1.7–9.3)
NEUTROPHILS # BLD AUTO: 3.88 K/UL — SIGNIFICANT CHANGE UP (ref 1.4–6.5)
NEUTROPHILS NFR BLD AUTO: 58.3 % — SIGNIFICANT CHANGE UP (ref 42.2–75.2)
NRBC # BLD: 0 /100 WBCS — SIGNIFICANT CHANGE UP (ref 0–0)
PLATELET # BLD AUTO: 226 K/UL — SIGNIFICANT CHANGE UP (ref 130–400)
POTASSIUM SERPL-MCNC: 4 MMOL/L — SIGNIFICANT CHANGE UP (ref 3.5–5)
POTASSIUM SERPL-SCNC: 4 MMOL/L — SIGNIFICANT CHANGE UP (ref 3.5–5)
PROCALCITONIN SERPL-MCNC: 0.05 NG/ML — SIGNIFICANT CHANGE UP (ref 0.02–0.1)
PROT SERPL-MCNC: 6.5 G/DL — SIGNIFICANT CHANGE UP (ref 6–8)
RBC # BLD: 4.1 M/UL — LOW (ref 4.7–6.1)
RBC # FLD: 13.2 % — SIGNIFICANT CHANGE UP (ref 11.5–14.5)
SODIUM SERPL-SCNC: 141 MMOL/L — SIGNIFICANT CHANGE UP (ref 135–146)
WBC # BLD: 6.65 K/UL — SIGNIFICANT CHANGE UP (ref 4.8–10.8)
WBC # FLD AUTO: 6.65 K/UL — SIGNIFICANT CHANGE UP (ref 4.8–10.8)

## 2022-12-14 PROCEDURE — 99232 SBSQ HOSP IP/OBS MODERATE 35: CPT

## 2022-12-14 PROCEDURE — 71045 X-RAY EXAM CHEST 1 VIEW: CPT | Mod: 26

## 2022-12-14 RX ADMIN — SENNA PLUS 2 TABLET(S): 8.6 TABLET ORAL at 21:38

## 2022-12-14 RX ADMIN — POLYETHYLENE GLYCOL 3350 17 GRAM(S): 17 POWDER, FOR SOLUTION ORAL at 07:09

## 2022-12-14 RX ADMIN — PANTOPRAZOLE SODIUM 40 MILLIGRAM(S): 20 TABLET, DELAYED RELEASE ORAL at 12:26

## 2022-12-14 RX ADMIN — Medication 1 TABLET(S): at 12:28

## 2022-12-14 RX ADMIN — TAMSULOSIN HYDROCHLORIDE 0.4 MILLIGRAM(S): 0.4 CAPSULE ORAL at 21:38

## 2022-12-14 RX ADMIN — LACTULOSE 10 GRAM(S): 10 SOLUTION ORAL at 00:02

## 2022-12-14 RX ADMIN — Medication 1 TABLET(S): at 06:09

## 2022-12-14 RX ADMIN — ENOXAPARIN SODIUM 40 MILLIGRAM(S): 100 INJECTION SUBCUTANEOUS at 12:27

## 2022-12-14 RX ADMIN — Medication 10 MILLIGRAM(S): at 12:26

## 2022-12-14 RX ADMIN — Medication 1 TABLET(S): at 17:44

## 2022-12-14 RX ADMIN — Medication 10 MILLIGRAM(S): at 06:09

## 2022-12-14 RX ADMIN — Medication 1 DROP(S): at 07:08

## 2022-12-14 RX ADMIN — LACTULOSE 10 GRAM(S): 10 SOLUTION ORAL at 12:27

## 2022-12-14 RX ADMIN — REMDESIVIR 200 MILLIGRAM(S): 5 INJECTION INTRAVENOUS at 21:38

## 2022-12-14 RX ADMIN — Medication 1 DROP(S): at 21:53

## 2022-12-14 RX ADMIN — POLYETHYLENE GLYCOL 3350 17 GRAM(S): 17 POWDER, FOR SOLUTION ORAL at 17:44

## 2022-12-14 RX ADMIN — Medication 10 MILLIGRAM(S): at 17:44

## 2022-12-14 RX ADMIN — Medication 10 MILLIGRAM(S): at 23:35

## 2022-12-14 RX ADMIN — LACTULOSE 10 GRAM(S): 10 SOLUTION ORAL at 06:09

## 2022-12-14 RX ADMIN — Medication 10 MILLIGRAM(S): at 00:01

## 2022-12-14 RX ADMIN — LACTULOSE 10 GRAM(S): 10 SOLUTION ORAL at 17:44

## 2022-12-14 RX ADMIN — LACTULOSE 10 GRAM(S): 10 SOLUTION ORAL at 23:35

## 2022-12-14 RX ADMIN — Medication 64.8 MILLIGRAM(S): at 12:27

## 2022-12-14 NOTE — DIETITIAN INITIAL EVALUATION ADULT - ADD RECOMMEND
1. Continue with current diet order per team  2. Please c/s RD if TF recs are required; pt currently tolerating pureed with moderately thick liquids. Pt only taking medication through PEG per RN  3. Please encourage PO intake and assist during meals    Pt assessed to be at high nutrition risk; will f/u in 4 days.

## 2022-12-14 NOTE — DIETITIAN INITIAL EVALUATION ADULT - PERTINENT MEDS FT
MEDICATIONS  (STANDING):  artificial  tears Solution 1 Drop(s) Both EYES three times a day  baclofen 10 milliGRAM(s) Oral every 6 hours  calcium carbonate   1250 mG (OsCal) 1 Tablet(s) Oral two times a day  enoxaparin Injectable 40 milliGRAM(s) SubCutaneous every 24 hours  lactulose Syrup 10 Gram(s) Oral every 6 hours  multivitamin 1 Tablet(s) Oral daily  pantoprazole    Tablet 40 milliGRAM(s) Oral before breakfast  PHENobarbital 64.8 milliGRAM(s) Oral daily  polyethylene glycol 3350 17 Gram(s) Oral two times a day  remdesivir  IVPB 100 milliGRAM(s) IV Intermittent every 24 hours  remdesivir  IVPB   IV Intermittent   senna 2 Tablet(s) Oral at bedtime  tamsulosin 0.4 milliGRAM(s) Oral at bedtime    MEDICATIONS  (PRN):  albuterol    90 MICROgram(s) HFA Inhaler 2 Puff(s) Inhalation every 6 hours PRN Shortness of Breath and/or Wheezing  guaifenesin/dextromethorphan Oral Liquid 10 milliLiter(s) Oral three times a day PRN Cough

## 2022-12-14 NOTE — DIETITIAN INITIAL EVALUATION ADULT - OTHER INFO
Pertinent Medical Information:   Per H&P, pt is a 66 y/o with PMHx of CP with spastic paraplegia s/p PEG, seizure disorder, and recurrent aspiration pneumonia presenting for 3 days of dry cough and SOB.     Pertinent Subjective Information:   Pt observed to be resting comfortably in bed. RD unable to observe meal tray at bedside. No PO intake documentation per flowsheet. No nausea or vomiting reported.    Per RN, pt is tolerating pureed with moderately thick liquids; PEG is only used for medication.     Weight hx: UBW unknown. Current dosing weight or height not documented this admission. Previous admission weight was 49.5 KG on 6/29/21; height 147.3 cm on 6/29/21.

## 2022-12-14 NOTE — DIETITIAN INITIAL EVALUATION ADULT - NAME AND PHONE
Estelle [phone] or TEAMS    Nutrition Monitoring: Diet order, PO intake, weights, labs, NFPF, body composition, BM

## 2022-12-14 NOTE — DIETITIAN INITIAL EVALUATION ADULT - OTHER CALCULATIONS
Using weight from previous admission at this time; will need new weight this admit: ENERGY: 8413-6327 kcal/day (30-35 kcal/kg); PROTEIN: 59-69 g/day (1.2-1.4 g/kg); FLUID: 2604-0365 mL/day (25-30 mL/kg) -- with consideration for age

## 2022-12-14 NOTE — DIETITIAN INITIAL EVALUATION ADULT - PERTINENT LABORATORY DATA
12-14    141  |  102  |  18  ----------------------------<  93  4.0   |  26  |  0.7    Ca    8.8      14 Dec 2022 07:50  Mg     2.1     12-14    TPro  6.5  /  Alb  4.2  /  TBili  <0.2  /  DBili  x   /  AST  17  /  ALT  10  /  Alk Phos  75  12-14

## 2022-12-14 NOTE — PROGRESS NOTE ADULT - ATTENDING COMMENTS
66 yo with PMH of CP with spastic paraplegia s/p PEG, seizure disorder, and recurrent aspiration pneumonia presenting for 3 days of dry cough and SOB.    #AHRF secondary to COVID  #h/o recurrent aspiration PNA  - COVID PCR positive  - isolation precautions  -ID consulted: plan for 3d course of remdesevir   - albuterol PRN  - Follow up inflammatory markers: ferritin, ESR, CRP, procalc, d-dimer  - DVT prophylaxis per protocol  - Follow up echo for possible PAH seen on CTA  - aspiration and seizure precautions    #CP with spastic paraplegia s/p PEG  #seizure disorder  - continue with home phenobarbital, baclofen    #BPH  - continue with home flomax    Dispo: pending completion of remdesevir tomorrow evening ( 7pm ) , and then dc back to group home after CM meeting on friday       Total time spent to complete patient's bedside assessment, review medical chart, discuss medical plan of care with covering medical team was more than 25 minutes  with >50% of time spent face to face with patient, discussion with patient/family and/or coordination of care      Marita Higgins DO

## 2022-12-14 NOTE — DIETITIAN INITIAL EVALUATION ADULT - ORAL INTAKE PTA/DIET HISTORY
Pt unable to participate in nutrition assessment interview. Unable to reach emergency contact at this time to obtain nutrition hx. Will attempt to again at follow up.

## 2022-12-15 ENCOUNTER — TRANSCRIPTION ENCOUNTER (OUTPATIENT)
Age: 67
End: 2022-12-15

## 2022-12-15 LAB
ALBUMIN SERPL ELPH-MCNC: 3.8 G/DL — SIGNIFICANT CHANGE UP (ref 3.5–5.2)
ALP SERPL-CCNC: 67 U/L — SIGNIFICANT CHANGE UP (ref 30–115)
ALT FLD-CCNC: 10 U/L — SIGNIFICANT CHANGE UP (ref 0–41)
ANION GAP SERPL CALC-SCNC: 14 MMOL/L — SIGNIFICANT CHANGE UP (ref 7–14)
AST SERPL-CCNC: 19 U/L — SIGNIFICANT CHANGE UP (ref 0–41)
BASOPHILS # BLD AUTO: 0.02 K/UL — SIGNIFICANT CHANGE UP (ref 0–0.2)
BASOPHILS NFR BLD AUTO: 0.4 % — SIGNIFICANT CHANGE UP (ref 0–1)
BILIRUB SERPL-MCNC: <0.2 MG/DL — SIGNIFICANT CHANGE UP (ref 0.2–1.2)
BUN SERPL-MCNC: 20 MG/DL — SIGNIFICANT CHANGE UP (ref 10–20)
CALCIUM SERPL-MCNC: 8.5 MG/DL — SIGNIFICANT CHANGE UP (ref 8.4–10.5)
CHLORIDE SERPL-SCNC: 104 MMOL/L — SIGNIFICANT CHANGE UP (ref 98–110)
CO2 SERPL-SCNC: 23 MMOL/L — SIGNIFICANT CHANGE UP (ref 17–32)
CREAT SERPL-MCNC: 0.7 MG/DL — SIGNIFICANT CHANGE UP (ref 0.7–1.5)
EGFR: 101 ML/MIN/1.73M2 — SIGNIFICANT CHANGE UP
EOSINOPHIL # BLD AUTO: 0.21 K/UL — SIGNIFICANT CHANGE UP (ref 0–0.7)
EOSINOPHIL NFR BLD AUTO: 4.1 % — SIGNIFICANT CHANGE UP (ref 0–8)
GLUCOSE SERPL-MCNC: 96 MG/DL — SIGNIFICANT CHANGE UP (ref 70–99)
HCT VFR BLD CALC: 36.5 % — LOW (ref 42–52)
HGB BLD-MCNC: 12 G/DL — LOW (ref 14–18)
IMM GRANULOCYTES NFR BLD AUTO: 0.6 % — HIGH (ref 0.1–0.3)
LYMPHOCYTES # BLD AUTO: 1.49 K/UL — SIGNIFICANT CHANGE UP (ref 1.2–3.4)
LYMPHOCYTES # BLD AUTO: 28.9 % — SIGNIFICANT CHANGE UP (ref 20.5–51.1)
MAGNESIUM SERPL-MCNC: 2 MG/DL — SIGNIFICANT CHANGE UP (ref 1.8–2.4)
MCHC RBC-ENTMCNC: 30.1 PG — SIGNIFICANT CHANGE UP (ref 27–31)
MCHC RBC-ENTMCNC: 32.9 G/DL — SIGNIFICANT CHANGE UP (ref 32–37)
MCV RBC AUTO: 91.5 FL — SIGNIFICANT CHANGE UP (ref 80–94)
MONOCYTES # BLD AUTO: 0.55 K/UL — SIGNIFICANT CHANGE UP (ref 0.1–0.6)
MONOCYTES NFR BLD AUTO: 10.7 % — HIGH (ref 1.7–9.3)
NEUTROPHILS # BLD AUTO: 2.85 K/UL — SIGNIFICANT CHANGE UP (ref 1.4–6.5)
NEUTROPHILS NFR BLD AUTO: 55.3 % — SIGNIFICANT CHANGE UP (ref 42.2–75.2)
NRBC # BLD: 0 /100 WBCS — SIGNIFICANT CHANGE UP (ref 0–0)
PLATELET # BLD AUTO: 213 K/UL — SIGNIFICANT CHANGE UP (ref 130–400)
POTASSIUM SERPL-MCNC: 3.8 MMOL/L — SIGNIFICANT CHANGE UP (ref 3.5–5)
POTASSIUM SERPL-SCNC: 3.8 MMOL/L — SIGNIFICANT CHANGE UP (ref 3.5–5)
PROT SERPL-MCNC: 6.1 G/DL — SIGNIFICANT CHANGE UP (ref 6–8)
RBC # BLD: 3.99 M/UL — LOW (ref 4.7–6.1)
RBC # FLD: 13.3 % — SIGNIFICANT CHANGE UP (ref 11.5–14.5)
SODIUM SERPL-SCNC: 141 MMOL/L — SIGNIFICANT CHANGE UP (ref 135–146)
WBC # BLD: 5.15 K/UL — SIGNIFICANT CHANGE UP (ref 4.8–10.8)
WBC # FLD AUTO: 5.15 K/UL — SIGNIFICANT CHANGE UP (ref 4.8–10.8)

## 2022-12-15 PROCEDURE — 99232 SBSQ HOSP IP/OBS MODERATE 35: CPT

## 2022-12-15 RX ADMIN — POLYETHYLENE GLYCOL 3350 17 GRAM(S): 17 POWDER, FOR SOLUTION ORAL at 05:50

## 2022-12-15 RX ADMIN — POLYETHYLENE GLYCOL 3350 17 GRAM(S): 17 POWDER, FOR SOLUTION ORAL at 18:13

## 2022-12-15 RX ADMIN — Medication 1 TABLET(S): at 12:37

## 2022-12-15 RX ADMIN — PANTOPRAZOLE SODIUM 40 MILLIGRAM(S): 20 TABLET, DELAYED RELEASE ORAL at 05:55

## 2022-12-15 RX ADMIN — Medication 10 MILLIGRAM(S): at 12:37

## 2022-12-15 RX ADMIN — REMDESIVIR 200 MILLIGRAM(S): 5 INJECTION INTRAVENOUS at 18:42

## 2022-12-15 RX ADMIN — Medication 10 MILLIGRAM(S): at 05:50

## 2022-12-15 RX ADMIN — ENOXAPARIN SODIUM 40 MILLIGRAM(S): 100 INJECTION SUBCUTANEOUS at 12:36

## 2022-12-15 RX ADMIN — Medication 10 MILLIGRAM(S): at 23:11

## 2022-12-15 RX ADMIN — Medication 64.8 MILLIGRAM(S): at 14:16

## 2022-12-15 RX ADMIN — Medication 10 MILLIGRAM(S): at 18:13

## 2022-12-15 RX ADMIN — Medication 1 TABLET(S): at 05:51

## 2022-12-15 RX ADMIN — LACTULOSE 10 GRAM(S): 10 SOLUTION ORAL at 05:50

## 2022-12-15 RX ADMIN — LACTULOSE 10 GRAM(S): 10 SOLUTION ORAL at 23:10

## 2022-12-15 RX ADMIN — Medication 1 DROP(S): at 05:54

## 2022-12-15 RX ADMIN — Medication 1 TABLET(S): at 18:13

## 2022-12-15 RX ADMIN — Medication 1 DROP(S): at 13:32

## 2022-12-15 RX ADMIN — Medication 1 DROP(S): at 23:01

## 2022-12-15 RX ADMIN — LACTULOSE 10 GRAM(S): 10 SOLUTION ORAL at 18:13

## 2022-12-15 RX ADMIN — LACTULOSE 10 GRAM(S): 10 SOLUTION ORAL at 12:37

## 2022-12-15 RX ADMIN — SENNA PLUS 2 TABLET(S): 8.6 TABLET ORAL at 23:57

## 2022-12-15 NOTE — DISCHARGE NOTE PROVIDER - NSDCCPCAREPLAN_GEN_ALL_CORE_FT
PRINCIPAL DISCHARGE DIAGNOSIS  Diagnosis: COVID-19  Assessment and Plan of Treatment: You have been diagnosed with COVID 19 infection. Most often it causes a respiratory illness that causes fever, coughing, and shortness of breath, but it may involve other organs, including the kidneys, heart, and liver. You may have mild to moderate symptoms or severe illness. Recovery from COVID-19 may take 10 to 14 days or longer depending on your symptoms. Some people have symptoms that go on for months even after they are no longer infected or able to spread the disease to other people.  For latest information on COVID 19 visit: https://www.cdc.gov/coronavirus/2019-ncov/index.html  While in home isolation, you should separate yourself and stay away from other people to help prevent spreading COVID-19. It is safe for you to be around others if ALL of the following are true:  1. It has been at least 5 full days since your symptoms first appeared (day 0 is the day symptoms appeared, and day 1 is the day after symptoms appeared) AND  2. You have gone at least 24 hours with no fever without the use of fever-reducing medicine AND  3. Your other symptoms are improving, including cough, fever, and shortness of breath. (You may end home isolation even if you continue to have symptoms such as loss of taste and smell, which may linger for weeks or months.)  If you still have a fever after 5 days, continue to isolate until you are fever-free without medicine for 24 hours.  It's important to get proper nutrition, stay active as much as you can, and take steps to relieve stress and anxiety as you recover at home.  Call 911 or your local emergency number if you have: Trouble breathing, Chest pain or pressure, Confusion or inability to wake up, Blue lips or face, Confusion, Seizures, Slurred speech, Weakness or numbness in a limb or one side of the face, Swelling of the legs or arms, Any other symptoms that are severe or concern you      SECONDARY DISCHARGE DIAGNOSES  Diagnosis: Pulmonary hypertension  Assessment and Plan of Treatment:     Diagnosis: COVID-19  Assessment and Plan of Treatment:

## 2022-12-15 NOTE — DISCHARGE NOTE PROVIDER - NSDCFUSCHEDAPPT_GEN_ALL_CORE_FT
[facility]  NEPHRO [address]  Scheduled Appointment: 12/27/2022    [facility]  UROLOGY [address] [facility]  Scheduled Appointment: 01/03/2023

## 2022-12-15 NOTE — PROGRESS NOTE ADULT - SUBJECTIVE AND OBJECTIVE BOX
TISH SHAIKH 67y Male  MRN#: [id number]     Hospital Day: 1d    Pt is currently admitted with the primary diagnosis of  ACUTE RESPIRATORY FAILURE WITH HYPOXIA; PULMONARY HYPERTENSION; COVID-19        SUBJECTIVE     Overnight events  None    Subjective complaints  Pt was evaluated this am. Patient unable to provide history.                                             ----------------------------------------------------------  OBJECTIVE  PAST MEDICAL & SURGICAL HISTORY  BPH (benign prostatic hyperplasia)    Cerebral palsy    Seizure  last seizure &gt;10 years ago    Osteoporosis    Spastic quadriplegia    Urinary calculi    Urinary retention    Asthma    S/P percutaneous endoscopic gastrostomy (PEG) tube placement    H/O cystoscopy    Suprapubic catheter                                              -----------------------------------------------------------  ALLERGIES:  No Known Allergies                                            ------------------------------------------------------------    HOME MEDICATIONS  Home Medications:  albuterol 2.5 mg/3 mL (0.083%) inhalation solution: 3 milliliter(s) inhaled every 6 hours (12 Dec 2022 22:17)  ammonium lactate topical lotion: Apply topically to affected area 2 times a day to feet (12 Dec 2022 22:17)  baclofen 10 mg oral tablet: 1 tab(s) orally 4 times a day (12 Dec 2022 22:17)  citric acid-potassium citrate 334 mg-1100 mg/5 mL oral liquid: 20 milliliter(s) orally 3 times a day (before meals) (12 Dec 2022 22:17)  Cranberry oral capsule: 425 milligram(s) by gastrostomy tube 2 times a day (12 Dec 2022 22:17)  DermaPhor topical ointment: Apply topically to affected area once a day (12 Dec 2022 22:17)  docusate sodium 50 mg/5 mL oral solution: 30 milliliter(s) by gastrostomy tube once a day (12 Dec 2022 22:17)  lactulose 10 g/15 mL oral solution: 15 milliliter(s) by gastrostomy tube once a day at 8pm (12 Dec 2022 22:17)  methenamine hippurate 1 g oral tablet: 1 tab(s) orally 2 times a day (12 Dec 2022 22:17)  miconazole 2% topical powder: Apply topically to affected area 2 times a day (12 Dec 2022 22:17)  Multiple Vitamins oral liquid: 1 teaspoon via g tube daily (12 Dec 2022 22:17)  omeprazole 40 mg oral delayed release capsule: 1 cap(s) orally once a day (12 Dec 2022 22:17)  Oyster Shell 500 (1250 mg calcium carbonate) oral tablet: 1 tab(s) orally 2 times a day (12 Dec 2022 22:17)  PHENobarbital 64.8 mg oral tablet: 1 tab(s) orally once a day via G tube (12 Dec 2022 22:17)  polyethylene glycol 3350 oral powder for reconstitution: orally 2 times a day (12 Dec 2022 22:17)  Prolia 60 mg/mL subcutaneous solution: 60 milligram(s) subcutaneous every 6 months (12 Dec 2022 22:17)  Refresh ophthalmic solution: 1 drop(s) to each affected eye 3 times a day (12 Dec 2022 22:17)                           MEDICATIONS:  STANDING MEDICATIONS  artificial  tears Solution 1 Drop(s) Both EYES three times a day  baclofen 10 milliGRAM(s) Oral every 6 hours  calcium carbonate   1250 mG (OsCal) 1 Tablet(s) Oral two times a day  enoxaparin Injectable 40 milliGRAM(s) SubCutaneous every 24 hours  guaiFENesin  milliGRAM(s) Oral every 12 hours  lactulose Syrup 10 Gram(s) Oral every 6 hours  multivitamin 1 Tablet(s) Oral daily  pantoprazole    Tablet 40 milliGRAM(s) Oral before breakfast  PHENobarbital 64.8 milliGRAM(s) Oral daily  polyethylene glycol 3350 17 Gram(s) Oral two times a day  senna 2 Tablet(s) Oral at bedtime  tamsulosin 0.4 milliGRAM(s) Oral at bedtime    PRN MEDICATIONS  albuterol    90 MICROgram(s) HFA Inhaler 2 Puff(s) Inhalation every 6 hours PRN                                            ------------------------------------------------------------  VITAL SIGNS: Last 24 Hours  T(C): 36.7 (13 Dec 2022 05:00), Max: 37.3 (12 Dec 2022 15:47)  T(F): 98.1 (13 Dec 2022 05:00), Max: 99.2 (12 Dec 2022 15:47)  HR: 85 (13 Dec 2022 05:00) (82 - 89)  BP: 133/65 (13 Dec 2022 05:00) (104/64 - 133/65)  BP(mean): 87 (13 Dec 2022 01:20) (87 - 87)  RR: 18 (13 Dec 2022 05:00) (18 - 19)  SpO2: 94% (13 Dec 2022 01:20) (94% - 100%)                                             --------------------------------------------------------------  LABS:                        13.1   6.86  )-----------( 210      ( 13 Dec 2022 08:46 )             37.6     12-13    137  |  99  |  15  ----------------------------<  173<H>  4.0   |  23  |  0.6<L>    Ca    9.3      13 Dec 2022 08:46  Mg     2.1     12-13    TPro  6.9  /  Alb  4.4  /  TBili  0.5  /  DBili  x   /  AST  18  /  ALT  12  /  AlkPhos  80  12-13          Sedimentation Rate, Erythrocyte: 41 mm/Hr *H* (12-13-22 @ 00:00)  Troponin T, Serum: <0.01 ng/mL (12-12-22 @ 16:04)          CARDIAC MARKERS ( 12 Dec 2022 16:04 )  x     / <0.01 ng/mL / x     / x     / x                                                  -------------------------------------------------------------  RADIOLOGY:  < from: CT Angio Chest PE Protocol w/ IV Cont (12.12.22 @ 18:23) >  IMPRESSION:  1.  No acute pulmonary embolism or acute thoracic pathology.  2.  Dilated main pulmonary artery which can be seen in the setting of   pulmonary arterial hypertension.      < from: Xray Chest 1 View-PORTABLE IMMEDIATE (12.12.22 @ 15:08) >  Impression:    Low lung volumes. Pulmonary vascular congestion.    Left hemidiaphragm redemonstrated. Dense material in the left colon,   presumably contrast. Correlate clinically.                                                --------------------------------------------------------------    PHYSICAL EXAM:  GENERAL: Mild distress  HEAD:  Atraumatic, Normocephalic  EYES: EOMI, conjunctiva and sclera clear  ENT: Moist mucous membranes, poor dentition  NECK: Supple, No JVD  CHEST/LUNG: Clear to auscultation bilaterally; No rales, rhonchi, wheezing, or rubs. Unlabored respirations  HEART: regular rate and rhythm; No murmurs, rubs, or gallops  ABDOMEN: Bowel sounds present; Soft, Nontender, Nondistended.    EXTREMITIES: Warm. No clubbing, cyanosis, or edema, extremities contracted  NERVOUS SYSTEM:  Alert & Oriented X1. No focal deficits   SKIN: No rashes or lesions                                           --------------------------------------------------------------                
TISH SHAIKH 67y Male  MRN#: [id number]     Hospital Day: 3d    Pt is currently admitted with the primary diagnosis of  ACUTE RESPIRATORY FAILURE WITH HYPOXIA; PULMONARY HYPERTENSION; COVID-19        SUBJECTIVE     Overnight events  None    Subjective complaints  Pt was evaluated this am. Patient denied any active complaints and per patient his symptoms are improving                                            ----------------------------------------------------------  OBJECTIVE  PAST MEDICAL & SURGICAL HISTORY  BPH (benign prostatic hyperplasia)    Cerebral palsy    Seizure  last seizure &gt;10 years ago    Osteoporosis    Spastic quadriplegia    Urinary calculi    Urinary retention    Asthma    S/P percutaneous endoscopic gastrostomy (PEG) tube placement    H/O cystoscopy    Suprapubic catheter                                              -----------------------------------------------------------  ALLERGIES:  No Known Allergies                                            ------------------------------------------------------------    HOME MEDICATIONS  Home Medications:  albuterol 2.5 mg/3 mL (0.083%) inhalation solution: 3 milliliter(s) inhaled every 6 hours (12 Dec 2022 22:17)  ammonium lactate topical lotion: Apply topically to affected area 2 times a day to feet (12 Dec 2022 22:17)  baclofen 10 mg oral tablet: 1 tab(s) orally 4 times a day (12 Dec 2022 22:17)  citric acid-potassium citrate 334 mg-1100 mg/5 mL oral liquid: 20 milliliter(s) orally 3 times a day (before meals) (12 Dec 2022 22:17)  Cranberry oral capsule: 425 milligram(s) by gastrostomy tube 2 times a day (12 Dec 2022 22:17)  DermaPhor topical ointment: Apply topically to affected area once a day (12 Dec 2022 22:17)  docusate sodium 50 mg/5 mL oral solution: 30 milliliter(s) by gastrostomy tube once a day (12 Dec 2022 22:17)  lactulose 10 g/15 mL oral solution: 15 milliliter(s) by gastrostomy tube once a day at 8pm (12 Dec 2022 22:17)  methenamine hippurate 1 g oral tablet: 1 tab(s) orally 2 times a day (12 Dec 2022 22:17)  miconazole 2% topical powder: Apply topically to affected area 2 times a day (12 Dec 2022 22:17)  Multiple Vitamins oral liquid: 1 teaspoon via g tube daily (12 Dec 2022 22:17)  omeprazole 40 mg oral delayed release capsule: 1 cap(s) orally once a day (12 Dec 2022 22:17)  Oyster Shell 500 (1250 mg calcium carbonate) oral tablet: 1 tab(s) orally 2 times a day (12 Dec 2022 22:17)  PHENobarbital 64.8 mg oral tablet: 1 tab(s) orally once a day via G tube (12 Dec 2022 22:17)  polyethylene glycol 3350 oral powder for reconstitution: orally 2 times a day (12 Dec 2022 22:17)  Prolia 60 mg/mL subcutaneous solution: 60 milligram(s) subcutaneous every 6 months (12 Dec 2022 22:17)  Refresh ophthalmic solution: 1 drop(s) to each affected eye 3 times a day (12 Dec 2022 22:17)                           MEDICATIONS:  STANDING MEDICATIONS  artificial  tears Solution 1 Drop(s) Both EYES three times a day  baclofen 10 milliGRAM(s) Oral every 6 hours  calcium carbonate   1250 mG (OsCal) 1 Tablet(s) Oral two times a day  enoxaparin Injectable 40 milliGRAM(s) SubCutaneous every 24 hours  lactulose Syrup 10 Gram(s) Oral every 6 hours  multivitamin 1 Tablet(s) Oral daily  pantoprazole    Tablet 40 milliGRAM(s) Oral before breakfast  PHENobarbital 64.8 milliGRAM(s) Oral daily  polyethylene glycol 3350 17 Gram(s) Oral two times a day  remdesivir  IVPB 100 milliGRAM(s) IV Intermittent every 24 hours  remdesivir  IVPB   IV Intermittent   senna 2 Tablet(s) Oral at bedtime  tamsulosin 0.4 milliGRAM(s) Oral at bedtime    PRN MEDICATIONS  albuterol    90 MICROgram(s) HFA Inhaler 2 Puff(s) Inhalation every 6 hours PRN  guaifenesin/dextromethorphan Oral Liquid 10 milliLiter(s) Oral three times a day PRN                                            ------------------------------------------------------------  VITAL SIGNS: Last 24 Hours  T(C): 36.8 (15 Dec 2022 12:24), Max: 37.1 (14 Dec 2022 20:06)  T(F): 98.3 (15 Dec 2022 12:24), Max: 98.8 (14 Dec 2022 20:06)  HR: 79 (15 Dec 2022 12:24) (59 - 79)  BP: 114/55 (15 Dec 2022 12:24) (91/53 - 114/55)  BP(mean): 66 (14 Dec 2022 20:06) (66 - 66)  RR: 18 (15 Dec 2022 12:24) (18 - 20)  SpO2: --      12-14-22 @ 07:01  -  12-15-22 @ 07:00  --------------------------------------------------------  IN: 0 mL / OUT: 250 mL / NET: -250 mL                                             --------------------------------------------------------------  LABS:                        12.0   5.15  )-----------( 213      ( 15 Dec 2022 08:43 )             36.5     12-15    141  |  104  |  20  ----------------------------<  96  3.8   |  23  |  0.7    Ca    8.5      15 Dec 2022 08:43  Mg     2.0     12-15    TPro  6.1  /  Alb  3.8  /  TBili  <0.2  /  DBili  x   /  AST  19  /  ALT  10  /  AlkPhos  67  12-15                Culture - Blood (collected 13 Dec 2022 00:00)  Source: .Blood Blood  Preliminary Report (14 Dec 2022 10:02):    No growth to date.    Culture - Blood (collected 13 Dec 2022 00:00)  Source: .Blood Blood  Preliminary Report (14 Dec 2022 10:02):    No growth to date.    Culture - Blood (collected 12 Dec 2022 15:44)  Source: .Blood Blood-Peripheral  Preliminary Report (14 Dec 2022 01:02):    No growth to date.    Culture - Blood (collected 12 Dec 2022 15:40)  Source: .Blood Blood-Peripheral  Preliminary Report (14 Dec 2022 02:02):    No growth to date.                                                    -------------------------------------------------------------  RADIOLOGY:  < from: Xray Chest 1 View- PORTABLE-Routine (Xray Chest 1 View- PORTABLE-Routine in AM.) (12.14.22 @ 06:52) >  Impression:    Low lung volumes with left basilar atelectasis.        < from: CT Angio Chest PE Protocol w/ IV Cont (12.12.22 @ 18:23) >  IMPRESSION:  1.  No acute pulmonary embolism or acute thoracic pathology.  2.  Dilated main pulmonary artery which can be seen in the setting of   pulmonary arterial hypertension.      < from: Xray Chest 1 View-PORTABLE IMMEDIATE (12.12.22 @ 15:08) >  Impression:    Low lung volumes. Pulmonary vascular congestion.    Left hemidiaphragm redemonstrated. Dense material in the left colon,   presumably contrast. Correlate clinically.                                              --------------------------------------------------------------    PHYSICAL EXAM:  GENERAL: Mild distress  HEAD:  Atraumatic, Normocephalic  EYES: EOMI, conjunctiva and sclera clear  ENT: Moist mucous membranes, poor dentition  NECK: Supple, No JVD  CHEST/LUNG: Clear to auscultation bilaterally; No rales, rhonchi, wheezing, or rubs. Unlabored respirations  HEART: regular rate and rhythm; No murmurs, rubs, or gallops  ABDOMEN: Bowel sounds present; Soft, Nontender, Nondistended.    EXTREMITIES: Warm. No clubbing, cyanosis, or edema, extremities contracted                                             --------------------------------------------------------------                
TISH SHAIKH 67y Male  MRN#: [id number]     Hospital Day: 2d    Pt is currently admitted with the primary diagnosis of  ACUTE RESPIRATORY FAILURE WITH HYPOXIA; PULMONARY HYPERTENSION; COVID-19        SUBJECTIVE     Overnight events  None    Subjective complaints  Pt was evaluated this am. Patient denied any active complaints and per patient his symptoms are improving                                            ----------------------------------------------------------  OBJECTIVE  PAST MEDICAL & SURGICAL HISTORY  BPH (benign prostatic hyperplasia)    Cerebral palsy    Seizure  last seizure &gt;10 years ago    Osteoporosis    Spastic quadriplegia    Urinary calculi    Urinary retention    Asthma    S/P percutaneous endoscopic gastrostomy (PEG) tube placement    H/O cystoscopy    Suprapubic catheter                                              -----------------------------------------------------------  ALLERGIES:  No Known Allergies                                            ------------------------------------------------------------    HOME MEDICATIONS  Home Medications:  albuterol 2.5 mg/3 mL (0.083%) inhalation solution: 3 milliliter(s) inhaled every 6 hours (12 Dec 2022 22:17)  ammonium lactate topical lotion: Apply topically to affected area 2 times a day to feet (12 Dec 2022 22:17)  baclofen 10 mg oral tablet: 1 tab(s) orally 4 times a day (12 Dec 2022 22:17)  citric acid-potassium citrate 334 mg-1100 mg/5 mL oral liquid: 20 milliliter(s) orally 3 times a day (before meals) (12 Dec 2022 22:17)  Cranberry oral capsule: 425 milligram(s) by gastrostomy tube 2 times a day (12 Dec 2022 22:17)  DermaPhor topical ointment: Apply topically to affected area once a day (12 Dec 2022 22:17)  docusate sodium 50 mg/5 mL oral solution: 30 milliliter(s) by gastrostomy tube once a day (12 Dec 2022 22:17)  lactulose 10 g/15 mL oral solution: 15 milliliter(s) by gastrostomy tube once a day at 8pm (12 Dec 2022 22:17)  methenamine hippurate 1 g oral tablet: 1 tab(s) orally 2 times a day (12 Dec 2022 22:17)  miconazole 2% topical powder: Apply topically to affected area 2 times a day (12 Dec 2022 22:17)  Multiple Vitamins oral liquid: 1 teaspoon via g tube daily (12 Dec 2022 22:17)  omeprazole 40 mg oral delayed release capsule: 1 cap(s) orally once a day (12 Dec 2022 22:17)  Oyster Shell 500 (1250 mg calcium carbonate) oral tablet: 1 tab(s) orally 2 times a day (12 Dec 2022 22:17)  PHENobarbital 64.8 mg oral tablet: 1 tab(s) orally once a day via G tube (12 Dec 2022 22:17)  polyethylene glycol 3350 oral powder for reconstitution: orally 2 times a day (12 Dec 2022 22:17)  Prolia 60 mg/mL subcutaneous solution: 60 milligram(s) subcutaneous every 6 months (12 Dec 2022 22:17)  Refresh ophthalmic solution: 1 drop(s) to each affected eye 3 times a day (12 Dec 2022 22:17)                           MEDICATIONS:  STANDING MEDICATIONS  artificial  tears Solution 1 Drop(s) Both EYES three times a day  baclofen 10 milliGRAM(s) Oral every 6 hours  calcium carbonate   1250 mG (OsCal) 1 Tablet(s) Oral two times a day  enoxaparin Injectable 40 milliGRAM(s) SubCutaneous every 24 hours  lactulose Syrup 10 Gram(s) Oral every 6 hours  multivitamin 1 Tablet(s) Oral daily  pantoprazole    Tablet 40 milliGRAM(s) Oral before breakfast  PHENobarbital 64.8 milliGRAM(s) Oral daily  polyethylene glycol 3350 17 Gram(s) Oral two times a day  remdesivir  IVPB 100 milliGRAM(s) IV Intermittent every 24 hours  remdesivir  IVPB   IV Intermittent   senna 2 Tablet(s) Oral at bedtime  tamsulosin 0.4 milliGRAM(s) Oral at bedtime    PRN MEDICATIONS  albuterol    90 MICROgram(s) HFA Inhaler 2 Puff(s) Inhalation every 6 hours PRN  guaifenesin/dextromethorphan Oral Liquid 10 milliLiter(s) Oral three times a day PRN                                            ------------------------------------------------------------  VITAL SIGNS: Last 24 Hours  T(C): 36.9 (14 Dec 2022 12:48), Max: 37 (13 Dec 2022 20:27)  T(F): 98.4 (14 Dec 2022 12:48), Max: 98.6 (13 Dec 2022 20:27)  HR: 75 (14 Dec 2022 12:48) (73 - 88)  BP: 94/55 (14 Dec 2022 12:48) (94/51 - 99/53)  BP(mean): --  RR: 18 (14 Dec 2022 12:48) (18 - 18)  SpO2: --      12-13-22 @ 07:01  -  12-14-22 @ 07:00  --------------------------------------------------------  IN: 0 mL / OUT: 725 mL / NET: -725 mL    12-14-22 @ 07:01  -  12-14-22 @ 16:06  --------------------------------------------------------  IN: 0 mL / OUT: 250 mL / NET: -250 mL                                             --------------------------------------------------------------  LABS:                        12.5   6.65  )-----------( 226      ( 14 Dec 2022 07:50 )             37.2     12-14    141  |  102  |  18  ----------------------------<  93  4.0   |  26  |  0.7    Ca    8.8      14 Dec 2022 07:50  Mg     2.1     12-14    TPro  6.5  /  Alb  4.2  /  TBili  <0.2  /  DBili  x   /  AST  17  /  ALT  10  /  AlkPhos  75  12-14                Culture - Blood (collected 13 Dec 2022 00:00)  Source: .Blood Blood  Preliminary Report (14 Dec 2022 10:02):    No growth to date.    Culture - Blood (collected 13 Dec 2022 00:00)  Source: .Blood Blood  Preliminary Report (14 Dec 2022 10:02):    No growth to date.    Culture - Blood (collected 12 Dec 2022 15:44)  Source: .Blood Blood-Peripheral  Preliminary Report (14 Dec 2022 01:02):    No growth to date.    Culture - Blood (collected 12 Dec 2022 15:40)  Source: .Blood Blood-Peripheral  Preliminary Report (14 Dec 2022 02:02):    No growth to date.                                                    -------------------------------------------------------------  RADIOLOGY:  < from: Xray Chest 1 View- PORTABLE-Routine (Xray Chest 1 View- PORTABLE-Routine in AM.) (12.14.22 @ 06:52) >  Impression:    Low lung volumes with left basilar atelectasis.        < from: CT Angio Chest PE Protocol w/ IV Cont (12.12.22 @ 18:23) >  IMPRESSION:  1.  No acute pulmonary embolism or acute thoracic pathology.  2.  Dilated main pulmonary artery which can be seen in the setting of   pulmonary arterial hypertension.      < from: Xray Chest 1 View-PORTABLE IMMEDIATE (12.12.22 @ 15:08) >  Impression:    Low lung volumes. Pulmonary vascular congestion.    Left hemidiaphragm redemonstrated. Dense material in the left colon,   presumably contrast. Correlate clinically.                                              --------------------------------------------------------------    PHYSICAL EXAM:  GENERAL: Mild distress  HEAD:  Atraumatic, Normocephalic  EYES: EOMI, conjunctiva and sclera clear  ENT: Moist mucous membranes, poor dentition  NECK: Supple, No JVD  CHEST/LUNG: Clear to auscultation bilaterally; No rales, rhonchi, wheezing, or rubs. Unlabored respirations  HEART: regular rate and rhythm; No murmurs, rubs, or gallops  ABDOMEN: Bowel sounds present; Soft, Nontender, Nondistended.    EXTREMITIES: Warm. No clubbing, cyanosis, or edema, extremities contracted                                         --------------------------------------------------------------

## 2022-12-15 NOTE — PROGRESS NOTE ADULT - ATTENDING COMMENTS
68 yo with PMH of CP with spastic paraplegia s/p PEG, seizure disorder, and recurrent aspiration pneumonia presenting for 3 days of dry cough and SOB.    #AHRF secondary to COVID  #h/o recurrent aspiration PNA  - COVID PCR positive  - isolation precautions  -ID consulted: plan for 3d course of remdesevir   - albuterol PRN  - DVT prophylaxis per protocol  - Follow up echo for possible PAH seen on CTA- normal EF, G1DD, limited exam does not comment on PASP . Can follow up OP w/ PCP   - aspiration and seizure precautions    #CP with spastic paraplegia s/p PEG  #seizure disorder  - continue with home phenobarbital, baclofen    #BPH  - continue with home flomax    Dispo: pending completion of remdesevir today evening ( 7pm ) , and then dc back to group home after CM meeting on friday       Total time spent to complete patient's bedside assessment, review medical chart, discuss medical plan of care with covering medical team was more than 25 minutes  with >50% of time spent face to face with patient, discussion with patient/family and/or coordination of care      Marita Higgins DO .

## 2022-12-15 NOTE — DISCHARGE NOTE PROVIDER - NSDCMRMEDTOKEN_GEN_ALL_CORE_FT
Albuterol (Eqv-ProAir HFA) 90 mcg/inh inhalation aerosol: 2 puff(s) inhaled every 6 hours  albuterol 2.5 mg/3 mL (0.083%) inhalation solution: 3 milliliter(s) inhaled every 6 hours  ammonium lactate topical lotion: Apply topically to affected area 2 times a day to feet  baclofen 10 mg oral tablet: 1 tab(s) orally 4 times a day  citric acid-potassium citrate 334 mg-1100 mg/5 mL oral liquid: 20 milliliter(s) orally 3 times a day (before meals)  Cranberry oral capsule: 425 milligram(s) by gastrostomy tube 2 times a day  DermaPhor topical ointment: Apply topically to affected area once a day  docusate sodium 50 mg/5 mL oral solution: 30 milliliter(s) by gastrostomy tube once a day  lactulose 10 g/15 mL oral solution: 15 milliliter(s) by gastrostomy tube once a day at 8pm  methenamine hippurate 1 g oral tablet: 1 tab(s) orally 2 times a day  miconazole 2% topical powder: Apply topically to affected area 2 times a day  Multiple Vitamins oral liquid: 1 teaspoon via g tube daily  omeprazole 40 mg oral delayed release capsule: 1 cap(s) orally once a day  Oyster Shell 500 (1250 mg calcium carbonate) oral tablet: 1 tab(s) orally 2 times a day  PHENobarbital 64.8 mg oral tablet: 1 tab(s) orally once a day via G tube  polyethylene glycol 3350 oral powder for reconstitution: orally 2 times a day  Prolia 60 mg/mL subcutaneous solution: 60 milligram(s) subcutaneous every 6 months  Refresh ophthalmic solution: 1 drop(s) to each affected eye 3 times a day  tamsulosin 0.4 mg oral capsule: 1 cap(s) orally once a day (at bedtime)   Albuterol (Eqv-ProAir HFA) 90 mcg/inh inhalation aerosol: 2 puff(s) inhaled every 6 hours  albuterol 2.5 mg/3 mL (0.083%) inhalation solution: 3 milliliter(s) inhaled every 6 hours  ammonium lactate topical lotion: Apply topically to affected area 2 times a day to feet  baclofen 10 mg oral tablet: 1 tab(s) orally 4 times a day  citric acid-potassium citrate 334 mg-1100 mg/5 mL oral liquid: 20 milliliter(s) orally 3 times a day (before meals)  Cranberry oral capsule: 425 milligram(s) by gastrostomy tube 2 times a day  DermaPhor topical ointment: Apply topically to affected area once a day  docusate sodium 50 mg/5 mL oral solution: 30 milliliter(s) by gastrostomy tube once a day  guaifenesin-dextromethorphan 100 mg-10 mg/5 mL oral liquid: 10 milliliter(s) orally 3 times a day, As needed, Cough  lactulose 10 g/15 mL oral solution: 15 milliliter(s) by gastrostomy tube once a day at 8pm  methenamine hippurate 1 g oral tablet: 1 tab(s) orally 2 times a day  miconazole 2% topical powder: Apply topically to affected area 2 times a day  Multiple Vitamins oral liquid: 1 teaspoon via g tube daily  omeprazole 40 mg oral delayed release capsule: 1 cap(s) orally once a day  Oyster Shell 500 (1250 mg calcium carbonate) oral tablet: 1 tab(s) orally 2 times a day  PHENobarbital 64.8 mg oral tablet: 1 tab(s) orally once a day via G tube  polyethylene glycol 3350 oral powder for reconstitution: orally 2 times a day  Prolia 60 mg/mL subcutaneous solution: 60 milligram(s) subcutaneous every 6 months  Refresh ophthalmic solution: 1 drop(s) to each affected eye 3 times a day  tamsulosin 0.4 mg oral capsule: 1 cap(s) orally once a day (at bedtime)

## 2022-12-15 NOTE — PROGRESS NOTE ADULT - ASSESSMENT
66 yo with PMH of CP with spastic paraplegia s/p PEG, seizure disorder, and recurrent aspiration pneumonia presenting for 3 days of dry cough and SOB.    #AHRF secondary to COVID  #h/o recurrent aspiration PNA  - COVID PCR positive  - isolation precautions  - satting 88-89% on RA, requiring supplemental oxygen  -ID consulted: plan for documentation of O2 sat on RA( If > 94%--> RDV x 3 days, If < 94%--> RDV x 5 days + Dex 6mg PO), started on RDV(today is D2)  - albuterol PRN  - Follow up inflammatory markers: ferritin, ESR, CRP, procalc, d-dimer  - DVT prophylaxis per protocol  - Follow up echo for possible PAH seen on CTA  - aspiration and seizure precautions  -Cough syrup PRN    #CP with spastic paraplegia s/p PEG  #seizure disorder  - continue with home phenobarbital, baclofen    #BPH  - continue with home flomax    #Misc:  #DVT PPX: lovenox  #GI PPX: protonix  #Diet: pureed  #Activity: bedrest  
66 yo with PMH of CP with spastic paraplegia s/p PEG, seizure disorder, and recurrent aspiration pneumonia presenting for 3 days of dry cough and SOB.    #AHRF secondary to COVID  #h/o recurrent aspiration PNA  - COVID PCR positive  - isolation precautions  - satting 88-89% on RA, requiring supplemental oxygen  -ID consulted: plan for documentation of O2 sat on RA( If > 94%--> RDV x 3 days, If < 94%--> RDV x 5 days + Dex 6mg PO), started on RDV(today is D3)  - albuterol PRN  - inflammatory markers: ferritin, ESR, CRP, procalc, d-dimer noted  - DVT prophylaxis per protocol  - Follow up echo for possible PAH seen on CTA  - aspiration and seizure precautions  -Cough syrup PRN    #CP with spastic paraplegia s/p PEG  #seizure disorder  - continue with home phenobarbital, baclofen    #BPH  - continue with home flomax    #Misc:  #DVT PPX: lovenox  #GI PPX: protonix  #Diet: pureed  #Activity: bedrest      
68 yo with PMH of CP with spastic paraplegia s/p PEG, seizure disorder, and recurrent aspiration pneumonia presenting for 3 days of dry cough and SOB.    #AHRF secondary to COVID  #h/o recurrent aspiration PNA  - COVID PCR positive  - isolation precautions  - satting 88-89% on RA, requiring supplemental oxygen  -ID consulted: plan for documentation of O2 sat on RA( If > 94%--> RDV x 3 days, If < 94%--> RDV x 5 days + Dex 6mg PO)  - albuterol PRN  - Follow up inflammatory markers: ferritin, ESR, CRP, procalc, d-dimer  - DVT prophylaxis per protocol  - Follow up echo for possible PAH seen on CTA  - aspiration and seizure precautions  -Cough syrup PRN    #CP with spastic paraplegia s/p PEG  #seizure disorder  - continue with home phenobarbital, baclofen    #BPH  - continue with home flomax    #Misc:  #DVT PPX: lovenox  #GI PPX: protonix  #Diet: pureed  #Activity: bedrest

## 2022-12-15 NOTE — DISCHARGE NOTE PROVIDER - ATTENDING DISCHARGE PHYSICAL EXAMINATION:
GENERAL: comfortable no distress   HEAD:  Atraumatic, Normocephalic  EYES: EOMI, conjunctiva and sclera clear  ENT: Moist mucous membranes, poor dentition  NECK: Supple, No JVD  CHEST/LUNG: Clear to auscultation bilaterally; No rales, rhonchi, wheezing, or rubs. Unlabored respirations  HEART: regular rate and rhythm; No murmurs, rubs, or gallops  ABDOMEN: Bowel sounds present; Soft, Nontender, Nondistended.    EXTREMITIES: Warm. No clubbing, cyanosis, or edema, extremities contracted

## 2022-12-15 NOTE — DISCHARGE NOTE PROVIDER - HOSPITAL COURSE
66 yo with PMH of CP with spastic paraplegia s/p PEG, seizure disorder, and recurrent aspiration pneumonia presented for 3 days of dry cough and SOB. Denied any recent travel but was exposed to recent sick contacts who were positive for COVID. Pt denies any recent fevers, chills, headaches, dizziness, nausea, vomiting, diarrhea, constipation, abdominal pain, chest pain, palpitations, new LE edema or orthopnea or PND. He is vaccinated for COVID and boosted. In the ED his vitals ere stable, Labs and VBG unremarkable.  CT angio chest showed no acute pulmonary embolism or acute thoracic pathology with Dilated main pulmonary artery which can be seen in the setting of pulmonary arterial hypertension.  CXR: Low lung volumes. Pulmonary vascular congestion.  He was admitted for AHRF secondary to COVID with h/o recurrent aspiration PNA. Isolation precautions were followed. ID was consulted and he was started on Remdesivir. He was managed symptomatically. His saturation level and vitals were monitored. CP with spastic paraplegia s/p PEG and seizure disorder, phenobarbital and baclofen were continued.   The patient's hospital course has been uneventful, is tolerating oral diet, is clinically stable and can be discharged.

## 2022-12-15 NOTE — DISCHARGE NOTE PROVIDER - CARE PROVIDER_API CALL
GAY STEVEN  [address] [place] [address]  Phone: [phone]  Fax: [phone]  Established Patient  Follow Up Time: 2 weeks

## 2022-12-16 ENCOUNTER — TRANSCRIPTION ENCOUNTER (OUTPATIENT)
Age: 67
End: 2022-12-16

## 2022-12-16 VITALS
HEART RATE: 87 BPM | DIASTOLIC BLOOD PRESSURE: 83 MMHG | TEMPERATURE: 98 F | SYSTOLIC BLOOD PRESSURE: 128 MMHG | RESPIRATION RATE: 18 BRPM

## 2022-12-16 LAB
ALBUMIN SERPL ELPH-MCNC: 3.8 G/DL — SIGNIFICANT CHANGE UP (ref 3.5–5.2)
ALP SERPL-CCNC: 64 U/L — SIGNIFICANT CHANGE UP (ref 30–115)
ALT FLD-CCNC: 9 U/L — SIGNIFICANT CHANGE UP (ref 0–41)
ANION GAP SERPL CALC-SCNC: 8 MMOL/L — SIGNIFICANT CHANGE UP (ref 7–14)
AST SERPL-CCNC: 15 U/L — SIGNIFICANT CHANGE UP (ref 0–41)
BASOPHILS # BLD AUTO: 0.01 K/UL — SIGNIFICANT CHANGE UP (ref 0–0.2)
BASOPHILS NFR BLD AUTO: 0.2 % — SIGNIFICANT CHANGE UP (ref 0–1)
BILIRUB SERPL-MCNC: <0.2 MG/DL — SIGNIFICANT CHANGE UP (ref 0.2–1.2)
BUN SERPL-MCNC: 19 MG/DL — SIGNIFICANT CHANGE UP (ref 10–20)
CALCIUM SERPL-MCNC: 8.3 MG/DL — LOW (ref 8.4–10.5)
CHLORIDE SERPL-SCNC: 108 MMOL/L — SIGNIFICANT CHANGE UP (ref 98–110)
CO2 SERPL-SCNC: 26 MMOL/L — SIGNIFICANT CHANGE UP (ref 17–32)
CREAT SERPL-MCNC: 0.7 MG/DL — SIGNIFICANT CHANGE UP (ref 0.7–1.5)
EGFR: 101 ML/MIN/1.73M2 — SIGNIFICANT CHANGE UP
EOSINOPHIL # BLD AUTO: 0.27 K/UL — SIGNIFICANT CHANGE UP (ref 0–0.7)
EOSINOPHIL NFR BLD AUTO: 6.3 % — SIGNIFICANT CHANGE UP (ref 0–8)
GLUCOSE SERPL-MCNC: 102 MG/DL — HIGH (ref 70–99)
HCT VFR BLD CALC: 35.6 % — LOW (ref 42–52)
HGB BLD-MCNC: 12.4 G/DL — LOW (ref 14–18)
IMM GRANULOCYTES NFR BLD AUTO: 0.5 % — HIGH (ref 0.1–0.3)
LYMPHOCYTES # BLD AUTO: 1.29 K/UL — SIGNIFICANT CHANGE UP (ref 1.2–3.4)
LYMPHOCYTES # BLD AUTO: 29.9 % — SIGNIFICANT CHANGE UP (ref 20.5–51.1)
MAGNESIUM SERPL-MCNC: 2.1 MG/DL — SIGNIFICANT CHANGE UP (ref 1.8–2.4)
MCHC RBC-ENTMCNC: 31.6 PG — HIGH (ref 27–31)
MCHC RBC-ENTMCNC: 34.8 G/DL — SIGNIFICANT CHANGE UP (ref 32–37)
MCV RBC AUTO: 90.6 FL — SIGNIFICANT CHANGE UP (ref 80–94)
MONOCYTES # BLD AUTO: 0.54 K/UL — SIGNIFICANT CHANGE UP (ref 0.1–0.6)
MONOCYTES NFR BLD AUTO: 12.5 % — HIGH (ref 1.7–9.3)
NEUTROPHILS # BLD AUTO: 2.18 K/UL — SIGNIFICANT CHANGE UP (ref 1.4–6.5)
NEUTROPHILS NFR BLD AUTO: 50.6 % — SIGNIFICANT CHANGE UP (ref 42.2–75.2)
NRBC # BLD: 0 /100 WBCS — SIGNIFICANT CHANGE UP (ref 0–0)
PLATELET # BLD AUTO: 202 K/UL — SIGNIFICANT CHANGE UP (ref 130–400)
POTASSIUM SERPL-MCNC: 4 MMOL/L — SIGNIFICANT CHANGE UP (ref 3.5–5)
POTASSIUM SERPL-SCNC: 4 MMOL/L — SIGNIFICANT CHANGE UP (ref 3.5–5)
PROT SERPL-MCNC: 5.8 G/DL — LOW (ref 6–8)
RBC # BLD: 3.93 M/UL — LOW (ref 4.7–6.1)
RBC # FLD: 13.2 % — SIGNIFICANT CHANGE UP (ref 11.5–14.5)
SARS-COV-2 RNA SPEC QL NAA+PROBE: DETECTED
SODIUM SERPL-SCNC: 142 MMOL/L — SIGNIFICANT CHANGE UP (ref 135–146)
WBC # BLD: 4.31 K/UL — LOW (ref 4.8–10.8)
WBC # FLD AUTO: 4.31 K/UL — LOW (ref 4.8–10.8)

## 2022-12-16 PROCEDURE — 99239 HOSP IP/OBS DSCHRG MGMT >30: CPT

## 2022-12-16 RX ORDER — GUAIFENESIN/DEXTROMETHORPHAN 600MG-30MG
10 TABLET, EXTENDED RELEASE 12 HR ORAL
Qty: 210 | Refills: 0
Start: 2022-12-16 | End: 2022-12-22

## 2022-12-16 RX ADMIN — PANTOPRAZOLE SODIUM 40 MILLIGRAM(S): 20 TABLET, DELAYED RELEASE ORAL at 08:18

## 2022-12-16 RX ADMIN — Medication 10 MILLIGRAM(S): at 05:51

## 2022-12-16 RX ADMIN — Medication 1 DROP(S): at 06:26

## 2022-12-16 RX ADMIN — Medication 10 MILLIGRAM(S): at 12:50

## 2022-12-16 RX ADMIN — LACTULOSE 10 GRAM(S): 10 SOLUTION ORAL at 12:49

## 2022-12-16 RX ADMIN — Medication 64.8 MILLIGRAM(S): at 12:50

## 2022-12-16 RX ADMIN — Medication 1 DROP(S): at 12:50

## 2022-12-16 RX ADMIN — POLYETHYLENE GLYCOL 3350 17 GRAM(S): 17 POWDER, FOR SOLUTION ORAL at 06:27

## 2022-12-16 RX ADMIN — Medication 1 TABLET(S): at 12:50

## 2022-12-16 RX ADMIN — LACTULOSE 10 GRAM(S): 10 SOLUTION ORAL at 05:51

## 2022-12-16 RX ADMIN — ENOXAPARIN SODIUM 40 MILLIGRAM(S): 100 INJECTION SUBCUTANEOUS at 12:49

## 2022-12-16 RX ADMIN — Medication 1 TABLET(S): at 05:51

## 2022-12-16 NOTE — DISCHARGE NOTE NURSING/CASE MANAGEMENT/SOCIAL WORK - NSDCPEFALRISK_GEN_ALL_CORE
For information on Fall & Injury Prevention, visit: [email or web address] OR  [email or web address] OR  https://www.cdc.gov/steadi/patient.html

## 2022-12-16 NOTE — CDI QUERY NOTE - NSCDIOTHERTXTBX_GEN_ALL_CORE_HH
CLINICAL INDICATORS  12/12 ED Provider Note: … EXT: upper and lower extremities contracted … wheelchair bound    12/15 Progress Note Adult-Internal Medicine Resident/Attending: … PAST MEDICAL & SURGICAL HISTORY … Cerebral palsy … Spastic quadriplegia … EXTREMITIES: … extremities contracted … Assessment and Plan: … CP with spastic paraplegia s/p PEG    12/13 Care Coordination Assessment: Functional Status Prior to Admission. Answers: Completely dependent … Notes: pt is completely dependent with ADLS    12/15 Functional Assessment: Daily Activity-Total assistance; Basic mobility-Total assistance    12/15 Nursing Awais Assessment: Activity-bedfast, mobility-completely immobile    The clinical information in the medical record documents a diagnosis, but the documentation is conflicting for cerebral palsy with spastic quadriplegia and cerebral palsy with spastic paraplegia. Based on the clinical information above, please clarify if the documentation of cerebral palsy be further clarified below?  • Pt has CP with spastic paraplegia  • Pt has CP with spastic quadriplegia  • Other (please specify):  • Clinically unable to determine    Thank you,  Jaylene Santiago RN [facility]  [phone]

## 2022-12-18 LAB
CULTURE RESULTS: SIGNIFICANT CHANGE UP
SPECIMEN SOURCE: SIGNIFICANT CHANGE UP

## 2022-12-27 ENCOUNTER — APPOINTMENT (OUTPATIENT)
Dept: NEPHROLOGY | Facility: CLINIC | Age: 67
End: 2022-12-27

## 2022-12-31 DIAGNOSIS — U07.1 COVID-19: ICD-10-CM

## 2022-12-31 DIAGNOSIS — G80.1 SPASTIC DIPLEGIC CEREBRAL PALSY: ICD-10-CM

## 2022-12-31 DIAGNOSIS — G40.909 EPILEPSY, UNSPECIFIED, NOT INTRACTABLE, WITHOUT STATUS EPILEPTICUS: ICD-10-CM

## 2022-12-31 DIAGNOSIS — N40.0 BENIGN PROSTATIC HYPERPLASIA WITHOUT LOWER URINARY TRACT SYMPTOMS: ICD-10-CM

## 2022-12-31 DIAGNOSIS — Z99.3 DEPENDENCE ON WHEELCHAIR: ICD-10-CM

## 2022-12-31 DIAGNOSIS — Z87.442 PERSONAL HISTORY OF URINARY CALCULI: ICD-10-CM

## 2022-12-31 DIAGNOSIS — J96.01 ACUTE RESPIRATORY FAILURE WITH HYPOXIA: ICD-10-CM

## 2022-12-31 DIAGNOSIS — I27.20 PULMONARY HYPERTENSION, UNSPECIFIED: ICD-10-CM

## 2022-12-31 DIAGNOSIS — Z93.1 GASTROSTOMY STATUS: ICD-10-CM

## 2023-01-02 ENCOUNTER — EMERGENCY (EMERGENCY)
Facility: HOSPITAL | Age: 68
LOS: 0 days | Discharge: HOME | End: 2023-01-02
Attending: EMERGENCY MEDICINE | Admitting: EMERGENCY MEDICINE
Payer: MEDICARE

## 2023-01-02 VITALS
HEART RATE: 91 BPM | RESPIRATION RATE: 18 BRPM | HEIGHT: 60 IN | DIASTOLIC BLOOD PRESSURE: 60 MMHG | SYSTOLIC BLOOD PRESSURE: 128 MMHG | OXYGEN SATURATION: 99 % | TEMPERATURE: 98 F
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
## 2023-01-02 DIAGNOSIS — J45.909 UNSPECIFIED ASTHMA, UNCOMPLICATED: ICD-10-CM

## 2023-01-02 DIAGNOSIS — Z93.1 GASTROSTOMY STATUS: Chronic | ICD-10-CM

## 2023-01-02 DIAGNOSIS — Z98.890 OTHER SPECIFIED POSTPROCEDURAL STATES: Chronic | ICD-10-CM
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
## 2023-01-02 DIAGNOSIS — R10.30 LOWER ABDOMINAL PAIN, UNSPECIFIED: ICD-10-CM

## 2023-01-02 DIAGNOSIS — T83.031A LEAKAGE OF INDWELLING URETHRAL CATHETER, INITIAL ENCOUNTER: ICD-10-CM

## 2023-01-02 DIAGNOSIS — Z87.442 PERSONAL HISTORY OF URINARY CALCULI: ICD-10-CM

## 2023-01-02 DIAGNOSIS — G80.9 CEREBRAL PALSY, UNSPECIFIED: ICD-10-CM

## 2023-01-02 DIAGNOSIS — Z93.59 OTHER CYSTOSTOMY STATUS: Chronic | ICD-10-CM
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
## 2023-01-02 DIAGNOSIS — M81.0 AGE-RELATED OSTEOPOROSIS WITHOUT CURRENT PATHOLOGICAL FRACTURE: ICD-10-CM

## 2023-01-02 DIAGNOSIS — N40.0 BENIGN PROSTATIC HYPERPLASIA WITHOUT LOWER URINARY TRACT SYMPTOMS: ICD-10-CM

## 2023-01-02 PROCEDURE — 51702 INSERT TEMP BLADDER CATH: CPT

## 2023-01-02 PROCEDURE — 99284 EMERGENCY DEPT VISIT MOD MDM: CPT | Mod: FS,25

## 2023-01-02 NOTE — ED PROVIDER NOTE - NS ED ATTENDING STATEMENT MOD
This was a shared visit with the [facility]. I reviewed and verified the documentation and independently performed the documented:

## 2023-01-02 NOTE — ED PROVIDER NOTE - PATIENT PORTAL LINK FT
You can access the FollowMyHealth Patient Portal offered by [facility] by registering at the following website: [email or web address]. By joining [facility]’s FollowMyHealth portal, you will also be able to view your health information using other applications (apps) compatible with our system.

## 2023-01-02 NOTE — ED ADULT NURSE NOTE - OBJECTIVE STATEMENT
Pt reports lower abd discomfort and states "the balloon is broke" related to his suprapubic cath. Suprapubic cath noted in placed secured and doesn't come out with slight pulling. Aishwarya BARONE made aware, balloon emptied and cathter replaced by Aishwarya BARONE. Will wait for urine to flow and send urine.

## 2023-01-02 NOTE — ED PROVIDER NOTE - PHYSICAL EXAMINATION
Vital Signs: I have reviewed the initial vital signs.  Constitutional: NAD, well-nourished, appears stated age, no acute distress.  HEENT: Airway patent, moist MM, no erythema/swelling/deformity of oral structures. EOMI, PERRLA.  CV: regular rate, regular rhythm, well-perfused extremities, 2+ b/l DP and radial pulses equal.  Lungs: BCTA, no increased WOB.  ABD: NTND, no guarding or rebound, no pulsatile mass, no hernias.   : Edouard in place with (+) leaking around sides, no other abnormalities  MSK: Neck supple, nontender, nl ROM, no stepoff. Chest nontender. Back nontender in TLS spine or to b/l bony structures or flanks. Ext nontender, nl rom, no deformity.   INTEG: Skin warm, dry, no rash.  NEURO: A&Ox3, normal strength, nl sensation throughout, normal speech.   PSYCH: Calm, cooperative, normal affect and interaction.

## 2023-01-02 NOTE — ED PROVIDER NOTE - OBJECTIVE STATEMENT
67 year old male, pmhx as documented presenting with lower abdominal cramping and leaking around alatorre catheter x 1 day. Otherwise denies fevers, N/V/D, blood in stool, urinary symptoms or any other complaints at this time.

## 2023-01-02 NOTE — ED PROVIDER NOTE - CLINICAL SUMMARY MEDICAL DECISION MAKING FREE TEXT BOX
Patient presented with leaking around alatorre. Otherwise afebrile, HD stable, abd completely non-tender, very well appearing. Alatorre replaced in ED without complication and leaking/lower abdominal cramping resolved. Given the above, will discharge home with outpatient follow up. Patient agreeable with plan. Agrees to return to ED for any new or worsening symptoms.

## 2023-01-02 NOTE — ED PROVIDER NOTE - NSFOLLOWUPINSTRUCTIONS_ED_ALL_ED_FT
Edouard Catheter Care, Adult    A Edouard catheter is a soft, flexible tube that is placed into the bladder to drain urine. A Edouard catheter may be inserted if:    You leak urine or are not able to control when you urinate (urinary incontinence).  You are not able to urinate when you need to (urinary retention).   You had prostate surgery or surgery on the genitals.  You have certain medical conditions, such as multiple sclerosis, dementia, or a spinal cord injury.    If you are going home with a Edouard catheter in place, follow the instructions below.    TAKING CARE OF THE CATHETER   Wash your hands with soap and water.   Using mild soap and warm water on a clean washcloth:    Clean the area on your body closest to the catheter insertion site using a circular motion, moving away from the catheter. Never wipe toward the catheter because this could sweep bacteria up into the urethra and cause infection.   Remove all traces of soap. Pat the area dry with a clean towel. For males, reposition the foreskin.    Attach the catheter to your leg so there is no tension on the catheter. Use adhesive tape or a leg strap. If you are using adhesive tape, remove any sticky residue left behind by the previous tape you used.   Keep the drainage bag below the level of the bladder, but keep it off the floor.   Check throughout the day to be sure the catheter is working and urine is draining freely. Make sure the tubing does not become kinked.  Do not pull on the catheter or try to remove it. Pulling could damage internal tissues.    TAKING CARE OF THE DRAINAGE BAGS  You will be given two drainage bags to take home. One is a large overnight drainage bag, and the other is a smaller leg bag that fits underneath clothing. You may wear the overnight bag at any time, but you should never wear the smaller leg bag at night. Follow the instructions below for how to empty, change, and clean your drainage bags.    Emptying the Drainage Bag    You must empty your drainage bag when it is ?–½ full or at least 2–3 times a day.     Wash your hands with soap and water.   Keep the drainage bag below your hips, below the level of your bladder. This stops urine from going back into the tubing and into your bladder.    Hold the dirty bag over the toilet or a clean container.   Open the pour spout at the bottom of the bag and empty the urine into the toilet or container. Do not let the pour spout touch the toilet, container, or any other surface. Doing so can place bacteria on the bag, which can cause an infection.   Clean the pour spout with a gauze pad or cotton ball that has rubbing alcohol on it.   Close the pour spout.   Attach the bag to your leg with adhesive tape or a leg strap.   Wash your hands well.    Changing the Drainage Bag    Change your drainage bag once a month or sooner if it starts to smell bad or look dirty. Below are steps to follow when changing the drainage bag.     Wash your hands with soap and water.   Pinch off the rubber catheter so that urine does not spill out.   Disconnect the catheter tube from the drainage tube at the connection valve. Do not let the tubes touch any surface.    Clean the end of the catheter tube with an alcohol wipe. Use a different alcohol wipe to clean the end of the drainage tube.   Connect the catheter tube to the drainage tube of the clean drainage bag.   Attach the new bag to the leg with adhesive tape or a leg strap. Avoid attaching the new bag too tightly.    Wash your hands well.    Cleaning the Drainage Bag     Wash your hands with soap and water.   Wash the bag in warm, soapy water.   Rinse the bag thoroughly with warm water.   Fill the bag with a solution of white vinegar and water (1 cup vinegar to 1 qt warm water [.2 L vinegar to 1 L warm water]). Close the bag and soak it for 30 minutes in the solution.    Rinse the bag with warm water.    Hang the bag to dry with the pour spout open and hanging downward.   Store the clean bag (once it is dry) in a clean plastic bag.   Wash your hands well.     PREVENTING INFECTION  Wash your hands before and after handling your catheter.  Take showers daily and wash the area where the catheter enters your body. Do not take baths. Replace wet leg straps with dry ones, if this applies.  Do not use powders, sprays, or lotions on the genital area. Only use creams, lotions, or ointments as directed by your caregiver.  For females, wipe from front to back after each bowel movement.   Drink enough fluids to keep your urine clear or pale yellow unless you have a fluid restriction.   Do not let the drainage bag or tubing touch or lie on the floor.   Wear cotton underwear to absorb moisture and to keep your .    SEEK MEDICAL CARE IF:  Your urine is cloudy or smells unusually bad.  Your catheter becomes clogged.  You are not draining urine into the bag or your bladder feels full.  Your catheter starts to leak.    SEEK IMMEDIATE MEDICAL CARE IF:  You have pain, swelling, redness, or pus where the catheter enters the body.  You have pain in the abdomen, legs, lower back, or bladder.  You have a fever.  You see blood fill the catheter, or your urine is pink or red.  You have nausea, vomiting, or chills.  Your catheter gets pulled out.    MAKE SURE YOU:  Understand these instructions.  Will watch your condition.  Will get help right away if you are not doing well or get worse.    ADDITIONAL NOTES AND INSTRUCTIONS    Please follow up with your Primary MD in 24-48 hr.  Seek immediate medical care for any new/worsening signs or symptoms.

## 2023-01-02 NOTE — ED PROVIDER NOTE - DIFFERENTIAL DIAGNOSIS
Alatorre leaking - likely 2/2 alatorre defect, will require replacement    Lower abdominal cramping - abd completely non-tender, likely 2/2 alatorre malplacement and discomfort. Pain resolved after proper alatorre placement. Differential Diagnosis

## 2023-01-02 NOTE — ED PROVIDER NOTE - COVID-19 RESULT DATE/TIME
Patient's request for a refill has been approved.  Order entered - sent to pharmacy.     16-Dec-2022 11:01

## 2023-01-02 NOTE — ED PROVIDER NOTE - TEST CONSIDERED BUT NOT PERFORMED
Tests Considered But Not Performed UA/urine culture considered but not warranted - defect is with alatorre and patient afebrile without other symptoms ot suggest UTI. Labs also considered but patient was not in retention and therefore not indicated for renal function.

## 2023-01-02 NOTE — ED PROVIDER NOTE - CONSIDERATION OF ADMISSION OBSERVATION
Edouard replaced and symptoms resolved. No indication for admission at this time. Consideration of Admission/Observation

## 2023-01-02 NOTE — ED PROVIDER NOTE - PROGRESS NOTE DETAILS
initally req urology c/s 2/2 no drainage after cath change, but cruz moore rec irrigation first. cath irrigated well and now draining. pt denies any complaints. d/w pt and aide will dc to f/u with urology, all ?s ans

## 2023-01-03 ENCOUNTER — APPOINTMENT (OUTPATIENT)
Dept: UROLOGY | Facility: CLINIC | Age: 68
End: 2023-01-03
Payer: MEDICARE

## 2023-01-03 PROCEDURE — 51705 CHANGE OF BLADDER TUBE: CPT

## 2023-01-21 ENCOUNTER — APPOINTMENT (OUTPATIENT)
Dept: ORTHOPEDIC SURGERY | Facility: CLINIC | Age: 68
End: 2023-01-21
Payer: MEDICARE

## 2023-01-21 VITALS — BODY MASS INDEX: 21.26 KG/M2 | WEIGHT: 120 LBS | HEIGHT: 63 IN

## 2023-01-21 PROCEDURE — 73562 X-RAY EXAM OF KNEE 3: CPT | Mod: 50

## 2023-01-21 PROCEDURE — 99203 OFFICE O/P NEW LOW 30 MIN: CPT

## 2023-01-21 RX ORDER — NAPROXEN 500 MG/1
500 TABLET ORAL
Qty: 60 | Refills: 0 | Status: ACTIVE | COMMUNITY
Start: 2023-01-21 | End: 1900-01-01

## 2023-01-21 NOTE — HISTORY OF PRESENT ILLNESS
[de-identified] : Patient is a 67-year-old male accompanied by AID here for evaluation of left knee.  Patient states about 1 week ago he began feeling a pain in his left knee without any injury or trauma.  Patient states that he may have banged the left knee while in the shower but does not know.  Patient is wheelchair/stretcher bound, does not ambulate.  Patient has history of cerebral palsy.  Denies numbness/tingling.

## 2023-01-21 NOTE — DATA REVIEWED
[FreeTextEntry1] : X-rays bilateral knees for comparison: No acute fractures, subluxations, dislocations.  Advanced osteoarthritis bilateral knees.  Low quality imaging due to patient's positioning

## 2023-01-21 NOTE — PHYSICAL EXAM
[Left] : left knee [] : uses wheelchair [FreeTextEntry8] : Minimal tenderness to palpation anterior knee [FreeTextEntry9] : Severe decreased range of motion consistent with patient's baseline/stiffness due to cerebral palsy [de-identified] : Patient's baseline shows instability consistent with cerebral palsy

## 2023-01-21 NOTE — DISCUSSION/SUMMARY
[de-identified] : Discussed x-rays with patient and aide showing no obvious acute fracture, subluxation, dislocation.  There is degenerative changes seen in the left knee.  Blood work ordered for further evaluation of inflammatory cause.  Naproxen 500 mg twice daily sent to patient's pharmacy, discussed side effects.  Advised patient and aide speak to primary physician before taking medication.  Advised on and off ice/heat to the knee.  Advised to call office if symptoms worsen or change.  Follow-up in 4 to 6 weeks for reevaluation.  Patient and AID understand agree with plan.  Seen in supervision of Dr. Barrera.

## 2023-02-01 ENCOUNTER — APPOINTMENT (OUTPATIENT)
Dept: UROLOGY | Facility: CLINIC | Age: 68
End: 2023-02-01
Payer: MEDICARE

## 2023-02-01 PROCEDURE — 51705 CHANGE OF BLADDER TUBE: CPT

## 2023-02-09 NOTE — CONSULT NOTE ADULT - TIME BILLING
Message to Dr. Longo for recommendations.    I have personally seen and examined this patient.  I have reviewed all pertinent clinical information and reviewed all relevant imaging and diagnostic studies personally.   If possible, I counseled the patient about diagnostic testing and treatment plan.   I discussed my recommendations with the primary team.

## 2023-02-17 ENCOUNTER — APPOINTMENT (OUTPATIENT)
Dept: ORTHOPEDIC SURGERY | Facility: CLINIC | Age: 68
End: 2023-02-17
Payer: MEDICARE

## 2023-02-17 PROCEDURE — 99213 OFFICE O/P EST LOW 20 MIN: CPT

## 2023-02-17 NOTE — PHYSICAL EXAM
[Left] : left knee [] : uses wheelchair [FreeTextEntry9] : Severe decreased range of motion consistent with patient's baseline/stiffness due to cerebral palsy [de-identified] : Patient's baseline shows instability consistent with cerebral palsy

## 2023-02-17 NOTE — HISTORY OF PRESENT ILLNESS
[de-identified] : Patient is a 67-year-old male accompanied by AID here for follow-up evaluation of left knee.  Patient states since last visit he has had minimal relief of pain. wheelchair/stretcher bound, does not ambulate.  Patient states pain comes and goes and is relieved with ice patient has history of cerebral palsy.  Denies numbness/tingling.

## 2023-02-17 NOTE — DISCUSSION/SUMMARY
[de-identified] : Reviewed blood work results with patient, negative for infection/inflammatory cause.  Advised continuation of conservative therapy including ice/heat, anti-inflammatories, Tylenol.  Call if symptoms worsen/change.  Advised patient to follow-up with primary physician for further evaluation of other causes of pain.  Patient will follow-up in 3 to 4 months for reevaluation.  Seen in supervision of Dr. Camarena.

## 2023-03-01 ENCOUNTER — APPOINTMENT (OUTPATIENT)
Dept: UROLOGY | Facility: CLINIC | Age: 68
End: 2023-03-01
Payer: MEDICARE

## 2023-03-01 PROCEDURE — 51705 CHANGE OF BLADDER TUBE: CPT

## 2023-03-14 RX ORDER — TAMSULOSIN HYDROCHLORIDE 0.4 MG/1
0.4 CAPSULE ORAL
Qty: 30 | Refills: 6 | Status: ACTIVE | COMMUNITY
Start: 2022-05-24 | End: 1900-01-01

## 2023-04-04 ENCOUNTER — APPOINTMENT (OUTPATIENT)
Dept: UROLOGY | Facility: CLINIC | Age: 68
End: 2023-04-04
Payer: MEDICARE

## 2023-04-04 PROCEDURE — 51705 CHANGE OF BLADDER TUBE: CPT

## 2023-04-11 ENCOUNTER — APPOINTMENT (OUTPATIENT)
Dept: NEPHROLOGY | Facility: CLINIC | Age: 68
End: 2023-04-11

## 2023-04-16 ENCOUNTER — EMERGENCY (EMERGENCY)
Facility: HOSPITAL | Age: 68
LOS: 0 days | Discharge: HOME | End: 2023-04-16
Attending: STUDENT IN AN ORGANIZED HEALTH CARE EDUCATION/TRAINING PROGRAM
Payer: MEDICARE

## 2023-04-16 VITALS
OXYGEN SATURATION: 94 % | RESPIRATION RATE: 18 BRPM | TEMPERATURE: 97 F | DIASTOLIC BLOOD PRESSURE: 95 MMHG | SYSTOLIC BLOOD PRESSURE: 126 MMHG | HEART RATE: 102 BPM

## 2023-04-16 VITALS — TEMPERATURE: 100 F

## 2023-04-16 DIAGNOSIS — Z98.890 OTHER SPECIFIED POSTPROCEDURAL STATES: Chronic | ICD-10-CM

## 2023-04-16 DIAGNOSIS — Z86.69 PERSONAL HISTORY OF OTHER DISEASES OF THE NERVOUS SYSTEM AND SENSE ORGANS: ICD-10-CM

## 2023-04-16 DIAGNOSIS — T83.84XA PAIN DUE TO GENITOURINARY PROSTHETIC DEVICES, IMPLANTS AND GRAFTS, INITIAL ENCOUNTER: ICD-10-CM

## 2023-04-16 DIAGNOSIS — Z93.59 OTHER CYSTOSTOMY STATUS: Chronic | ICD-10-CM
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
## 2023-04-16 DIAGNOSIS — N39.0 URINARY TRACT INFECTION, SITE NOT SPECIFIED: ICD-10-CM
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
## 2023-04-16 DIAGNOSIS — Z93.1 GASTROSTOMY STATUS: Chronic | ICD-10-CM

## 2023-04-16 DIAGNOSIS — Z87.448 PERSONAL HISTORY OF OTHER DISEASES OF URINARY SYSTEM: ICD-10-CM

## 2023-04-16 DIAGNOSIS — G80.9 CEREBRAL PALSY, UNSPECIFIED: ICD-10-CM

## 2023-04-16 LAB
ALBUMIN SERPL ELPH-MCNC: 4.1 G/DL — SIGNIFICANT CHANGE UP (ref 3.5–5.2)
ALP SERPL-CCNC: 107 U/L — SIGNIFICANT CHANGE UP (ref 30–115)
ALT FLD-CCNC: 11 U/L — SIGNIFICANT CHANGE UP (ref 0–41)
ANION GAP SERPL CALC-SCNC: 12 MMOL/L — SIGNIFICANT CHANGE UP (ref 7–14)
APPEARANCE UR: ABNORMAL
AST SERPL-CCNC: 28 U/L — SIGNIFICANT CHANGE UP (ref 0–41)
BACTERIA # UR AUTO: ABNORMAL
BASOPHILS # BLD AUTO: 0.03 K/UL — SIGNIFICANT CHANGE UP (ref 0–0.2)
BASOPHILS NFR BLD AUTO: 0.3 % — SIGNIFICANT CHANGE UP (ref 0–1)
BILIRUB SERPL-MCNC: 0.4 MG/DL — SIGNIFICANT CHANGE UP (ref 0.2–1.2)
BILIRUB UR-MCNC: NEGATIVE — SIGNIFICANT CHANGE UP
BUN SERPL-MCNC: 10 MG/DL — SIGNIFICANT CHANGE UP (ref 10–20)
CALCIUM SERPL-MCNC: 9.5 MG/DL — SIGNIFICANT CHANGE UP (ref 8.4–10.5)
CHLORIDE SERPL-SCNC: 98 MMOL/L — SIGNIFICANT CHANGE UP (ref 98–110)
CO2 SERPL-SCNC: 24 MMOL/L — SIGNIFICANT CHANGE UP (ref 17–32)
COLOR SPEC: YELLOW — SIGNIFICANT CHANGE UP
CREAT SERPL-MCNC: 0.6 MG/DL — LOW (ref 0.7–1.5)
DIFF PNL FLD: ABNORMAL
EGFR: 106 ML/MIN/1.73M2 — SIGNIFICANT CHANGE UP
EOSINOPHIL # BLD AUTO: 0.21 K/UL — SIGNIFICANT CHANGE UP (ref 0–0.7)
EOSINOPHIL NFR BLD AUTO: 2 % — SIGNIFICANT CHANGE UP (ref 0–8)
EPI CELLS # UR: 2 /HPF — SIGNIFICANT CHANGE UP (ref 0–5)
GLUCOSE SERPL-MCNC: 113 MG/DL — HIGH (ref 70–99)
GLUCOSE UR QL: NEGATIVE — SIGNIFICANT CHANGE UP
HCT VFR BLD CALC: 39.4 % — LOW (ref 42–52)
HGB BLD-MCNC: 13.6 G/DL — LOW (ref 14–18)
HYALINE CASTS # UR AUTO: 7 /LPF — SIGNIFICANT CHANGE UP (ref 0–7)
IMM GRANULOCYTES NFR BLD AUTO: 0.4 % — HIGH (ref 0.1–0.3)
KETONES UR-MCNC: NEGATIVE — SIGNIFICANT CHANGE UP
LACTATE SERPL-SCNC: 1.2 MMOL/L — SIGNIFICANT CHANGE UP (ref 0.7–2)
LEUKOCYTE ESTERASE UR-ACNC: ABNORMAL
LYMPHOCYTES # BLD AUTO: 0.82 K/UL — LOW (ref 1.2–3.4)
LYMPHOCYTES # BLD AUTO: 7.8 % — LOW (ref 20.5–51.1)
MCHC RBC-ENTMCNC: 30.3 PG — SIGNIFICANT CHANGE UP (ref 27–31)
MCHC RBC-ENTMCNC: 34.5 G/DL — SIGNIFICANT CHANGE UP (ref 32–37)
MCV RBC AUTO: 87.8 FL — SIGNIFICANT CHANGE UP (ref 80–94)
MONOCYTES # BLD AUTO: 1.02 K/UL — HIGH (ref 0.1–0.6)
MONOCYTES NFR BLD AUTO: 9.7 % — HIGH (ref 1.7–9.3)
NEUTROPHILS # BLD AUTO: 8.35 K/UL — HIGH (ref 1.4–6.5)
NEUTROPHILS NFR BLD AUTO: 79.8 % — HIGH (ref 42.2–75.2)
NITRITE UR-MCNC: NEGATIVE — SIGNIFICANT CHANGE UP
NRBC # BLD: 0 /100 WBCS — SIGNIFICANT CHANGE UP (ref 0–0)
PH UR: 8.5 — HIGH (ref 5–8)
PLATELET # BLD AUTO: 238 K/UL — SIGNIFICANT CHANGE UP (ref 130–400)
PMV BLD: 11 FL — HIGH (ref 7.4–10.4)
POTASSIUM SERPL-MCNC: 4.6 MMOL/L — SIGNIFICANT CHANGE UP (ref 3.5–5)
POTASSIUM SERPL-SCNC: 4.6 MMOL/L — SIGNIFICANT CHANGE UP (ref 3.5–5)
PROT SERPL-MCNC: 7.2 G/DL — SIGNIFICANT CHANGE UP (ref 6–8)
PROT UR-MCNC: ABNORMAL
RBC # BLD: 4.49 M/UL — LOW (ref 4.7–6.1)
RBC # FLD: 13.2 % — SIGNIFICANT CHANGE UP (ref 11.5–14.5)
RBC CASTS # UR COMP ASSIST: 154 /HPF — HIGH (ref 0–4)
SODIUM SERPL-SCNC: 134 MMOL/L — LOW (ref 135–146)
SP GR SPEC: 1.01 — SIGNIFICANT CHANGE UP (ref 1.01–1.03)
UROBILINOGEN FLD QL: SIGNIFICANT CHANGE UP
WBC # BLD: 10.47 K/UL — SIGNIFICANT CHANGE UP (ref 4.8–10.8)
WBC # FLD AUTO: 10.47 K/UL — SIGNIFICANT CHANGE UP (ref 4.8–10.8)
WBC UR QL: 277 /HPF — HIGH (ref 0–5)

## 2023-04-16 PROCEDURE — 36415 COLL VENOUS BLD VENIPUNCTURE: CPT

## 2023-04-16 PROCEDURE — 81001 URINALYSIS AUTO W/SCOPE: CPT

## 2023-04-16 PROCEDURE — 85025 COMPLETE CBC W/AUTO DIFF WBC: CPT

## 2023-04-16 PROCEDURE — C2627: CPT

## 2023-04-16 PROCEDURE — 51702 INSERT TEMP BLADDER CATH: CPT

## 2023-04-16 PROCEDURE — 51705 CHANGE OF BLADDER TUBE: CPT

## 2023-04-16 PROCEDURE — 96374 THER/PROPH/DIAG INJ IV PUSH: CPT | Mod: XU

## 2023-04-16 PROCEDURE — 99284 EMERGENCY DEPT VISIT MOD MDM: CPT | Mod: FS,25

## 2023-04-16 PROCEDURE — 87086 URINE CULTURE/COLONY COUNT: CPT

## 2023-04-16 PROCEDURE — 99284 EMERGENCY DEPT VISIT MOD MDM: CPT | Mod: 25

## 2023-04-16 PROCEDURE — 83605 ASSAY OF LACTIC ACID: CPT

## 2023-04-16 PROCEDURE — 80053 COMPREHEN METABOLIC PANEL: CPT

## 2023-04-16 RX ORDER — CEFTRIAXONE 500 MG/1
1000 INJECTION, POWDER, FOR SOLUTION INTRAMUSCULAR; INTRAVENOUS ONCE
Refills: 0 | Status: COMPLETED | OUTPATIENT
Start: 2023-04-16 | End: 2023-04-16

## 2023-04-16 RX ORDER — SODIUM CHLORIDE 9 MG/ML
1000 INJECTION, SOLUTION INTRAVENOUS ONCE
Refills: 0 | Status: COMPLETED | OUTPATIENT
Start: 2023-04-16 | End: 2023-04-16

## 2023-04-16 RX ORDER — CEFPODOXIME PROXETIL 100 MG
1 TABLET ORAL
Qty: 20 | Refills: 0
Start: 2023-04-16 | End: 2023-04-25

## 2023-04-16 RX ADMIN — CEFTRIAXONE 100 MILLIGRAM(S): 500 INJECTION, POWDER, FOR SOLUTION INTRAMUSCULAR; INTRAVENOUS at 16:27

## 2023-04-16 RX ADMIN — SODIUM CHLORIDE 1000 MILLILITER(S): 9 INJECTION, SOLUTION INTRAVENOUS at 14:25

## 2023-04-16 NOTE — ED PROVIDER NOTE - OBJECTIVE STATEMENT
67-year-old male, PMH cerebral palsy, seizure, BPH s/p suprapubic catheter and PEG, comes from group home for evaluation of severe catheter.  + Reported discomfort around site.  No fever nausea, vomiting, weakness, diarrhea.  At baseline mental status.  No new medication.  No trauma or falls.

## 2023-04-16 NOTE — ED PROVIDER NOTE - PHYSICAL EXAMINATION
CONSTITUTIONAL: Well-appearing; well-nourished; in no apparent distress.   EYES: PERRL; EOM intact.   ENT: normal nose; no rhinorrhea; normal pharynx with no tonsillar hypertrophy.   NECK: Supple; non-tender; no cervical lymphadenopathy.   CARDIOVASCULAR: Normal S1, S2; no murmurs, rubs, or gallops.   RESPIRATORY: Normal chest excursion with respiration; breath sounds clear and equal bilaterally; no wheezes, rhonchi, or rales.  GI/: Normal bowel sounds; non-distended; non-tender; no palpable organomegaly. PEG tube in place. Suprapubic alatorre site without any surrounding erythema/warmth noted  MS: No evidence of trauma or deformity.  Normal ROM in all four extremities; non-tender to palpation; distal pulses are normal.   SKIN: Normal for age and race; warm; dry; good turgor; no apparent lesions or exudate.   NEURO/PSYCH: PT awake and alert. + spastic quadriplegia. No focal deficits. Pt following commands well

## 2023-04-16 NOTE — ED PROVIDER NOTE - CLINICAL SUMMARY MEDICAL DECISION MAKING FREE TEXT BOX
.    67-year-old male, PMH cerebral palsy, seizure, BPH s/p suprapubic catheter and PEG, sent from group home assessment of suprapubic catheter. + discomfort at site. Exam as noted; abd s/nt. All available lab tests, imaging tests, and EKGs independently reviewed and interpreted by me. labs reviewed. catheter replaced and UA drawn, + UTI. No acute ED events.  IMP: UTI.  Pt stable for dc w/ outpt f/up, abx, and care as discussed.  DC back to group home.    .

## 2023-04-16 NOTE — ED PROVIDER NOTE - PATIENT PORTAL LINK FT
You can access the FollowMyHealth Patient Portal offered by [facility] by registering at the following website: [email or web address]. By joining [email or web address]’s FollowMyHealth portal, you will also be able to view your health information using other applications (apps) compatible with our system.

## 2023-04-16 NOTE — ED ADULT TRIAGE NOTE - CHIEF COMPLAINT QUOTE
Patient complaining of pain around suprapubic cathether x1 day. Catheter draining with some blood and pus.

## 2023-04-18 LAB
CULTURE RESULTS: SIGNIFICANT CHANGE UP
SPECIMEN SOURCE: SIGNIFICANT CHANGE UP

## 2023-04-19 NOTE — ED ADULT NURSE NOTE - CAS EDN DISCHARGE INTERVENTIONS
DATE OF SERVICE: 4/19/2023    DIAGNOSIS:  Primary adenocarcinoma of right lung (HCC)  Staging form: Lung, AJCC 8th Edition  - Clinical stage from 3/30/2023: Stage IA2 (cT1b, cN0, cM0) - Signed by Hai Barrera M.D. on 3/30/2023  Histopathologic type: Adenocarcinoma, NOS  Stage prefix: Initial diagnosis       TREATMENT:  Radiation Therapy Episodes       Active Episodes       Radiation Therapy: SBRT (4/17/2023)                   Radiation Treatments         Plan Last Treated On Elapsed Days Fractions Treated Prescribed Fraction Dose (cGy) Prescribed Total Dose (cGy)    RLL Lung SBRT 4/19/2023 2 @ [id number] 3 of 5 1,000 5,000                  Reference Point Last Treated On Elapsed Days Most Recent Session Dose (cGy) Total Dose (cGy)    PTV 4/19/2023 2 @ [id number] 1,000 3,000    RLL cp 4/19/2023 2 @ [id number] 1,246 3,738                            STEREOTACTIC PROCEDURE NOTE:    Called by [employer] machine to verify treatment parameters including:  treatment site, treatment dose, and treatment setup prior to stereotactic treatment..    Patient was placed in the treatment position with use of immobilization device and  laser guidance. CBCT images were acquired for target localization.  Images were reviewed in the axial, coronal, and saggital views and shifts were made as necessary to ensure that patient position matched simulation position.      Treatment delivered per  prescription.  The medical physicist was present throughout the set-up, verification and treatment delivery to oversee the procedure and ensure all parameters agreed with the computerized plan.    I have personally reviewed the relevant data, performed the target localization, and determined all relevant factors for this patient’s simulation.      
IV intact

## 2023-05-09 ENCOUNTER — APPOINTMENT (OUTPATIENT)
Dept: NEPHROLOGY | Facility: CLINIC | Age: 68
End: 2023-05-09
Payer: MEDICARE

## 2023-05-09 ENCOUNTER — OUTPATIENT (OUTPATIENT)
Dept: OUTPATIENT SERVICES | Facility: HOSPITAL | Age: 68
LOS: 1 days | End: 2023-05-09
Payer: MEDICARE

## 2023-05-09 VITALS
HEIGHT: 63 IN | HEART RATE: 76 BPM | WEIGHT: 120 LBS | OXYGEN SATURATION: 95 % | SYSTOLIC BLOOD PRESSURE: 136 MMHG | DIASTOLIC BLOOD PRESSURE: 88 MMHG | TEMPERATURE: 97.3 F | BODY MASS INDEX: 21.26 KG/M2

## 2023-05-09 DIAGNOSIS — N31.9 NEUROMUSCULAR DYSFUNCTION OF BLADDER, UNSPECIFIED: ICD-10-CM

## 2023-05-09 DIAGNOSIS — Z98.890 OTHER SPECIFIED POSTPROCEDURAL STATES: Chronic | ICD-10-CM

## 2023-05-09 DIAGNOSIS — Z93.1 GASTROSTOMY STATUS: Chronic | ICD-10-CM

## 2023-05-09 DIAGNOSIS — Z00.00 ENCOUNTER FOR GENERAL ADULT MEDICAL EXAMINATION WITHOUT ABNORMAL FINDINGS: ICD-10-CM

## 2023-05-09 DIAGNOSIS — N20.0 CALCULUS OF KIDNEY: ICD-10-CM

## 2023-05-09 DIAGNOSIS — Z93.59 OTHER CYSTOSTOMY STATUS: Chronic | ICD-10-CM

## 2023-05-09 DIAGNOSIS — T83.090A: ICD-10-CM

## 2023-05-09 DIAGNOSIS — T83.090A OTHER MECHANICAL COMPLICATION OF CYSTOSTOMY CATHETER, INITIAL ENCOUNTER: ICD-10-CM

## 2023-05-09 PROCEDURE — 99213 OFFICE O/P EST LOW 20 MIN: CPT | Mod: GC

## 2023-05-09 PROCEDURE — 99213 OFFICE O/P EST LOW 20 MIN: CPT

## 2023-05-09 NOTE — HISTORY OF PRESENT ILLNESS
[FreeTextEntry1] : 66 y/o M w/ PMhx of CP (wheelchair bound), s/p PEG, s/p suprapubic catheter d/t urethral strictures here for follow up for nephrolithiasis. Seen by urology team . Hx hyperoxaluria, neurogenic bladder, BPH, Seizures, right nonobstructing upper pole calculus\par \par Overall patient feels well, denies any back pain, hematuria, chest pain, muscle pain, weakness, urinary urgency, dysuria, fever, or chills.

## 2023-05-09 NOTE — ASSESSMENT
[FreeTextEntry1] : 66 y/o M w/ PMhx of CP (wheelchair bound), s/p PEG, s/p suprapubic catheter d/t urethral strictures here for follow up for nephrolithiasis. \par  \par #nephrolithiasis\par - CMP results noted with normal kidney function \par - 11/2019 CTAP: no hydronephrosis, punctate nonobstructing R upper pole calculus\par - 24 hr urine noted for volume 2700 ml and hyperoxaluria\par - potassium citrate solution to 20 ml three times a day\par - c/w free water, free water 500 cc q 5 hr via PEG tube starting 6 AM but hold 2 AM Free water intake via PEG tube daily. In addition to post PEG feeds flushes\par - Avoid diet with high oxalate e.g. leafy vegetables & nuts.\par - repeat CMP before next visit \par - Sono noted with smalll right kidney and could not see left kidney \par - f/u in 6 months.

## 2023-05-09 NOTE — REVIEW OF SYSTEMS
[Negative] : Musculoskeletal [Fever] : no fever [Chills] : no chills [FreeTextEntry2] : in a wheelchair

## 2023-05-10 ENCOUNTER — APPOINTMENT (OUTPATIENT)
Dept: UROLOGY | Facility: CLINIC | Age: 68
End: 2023-05-10
Payer: MEDICARE

## 2023-05-10 PROCEDURE — 51705 CHANGE OF BLADDER TUBE: CPT

## 2023-05-19 ENCOUNTER — APPOINTMENT (OUTPATIENT)
Dept: ORTHOPEDIC SURGERY | Facility: CLINIC | Age: 68
End: 2023-05-19
Payer: MEDICARE

## 2023-05-19 DIAGNOSIS — M25.562 PAIN IN LEFT KNEE: ICD-10-CM

## 2023-05-19 PROCEDURE — 99213 OFFICE O/P EST LOW 20 MIN: CPT

## 2023-05-19 NOTE — HISTORY OF PRESENT ILLNESS
[de-identified] : Patient is a 67-year-old male accompanied by AID here for follow-up evaluation of left knee.  Patient states since last visit he has had some relief with pain. wheelchair/stretcher bound, does not ambulate.  Patient states pain comes and goes and is relieved with ice patient has history of cerebral palsy.  Patient states sometimes he goes days without no pain at all but then pain returns.  Denies numbness/tingling.  Denies injury/trauma.

## 2023-05-19 NOTE — DISCUSSION/SUMMARY
[de-identified] : Reviewed blood work results with patient, negative for infection/inflammatory cause.  Patient's pain is consistent with osteoarthritis of left knee.  Advised continuation of conservative therapy including ice/heat, anti-inflammatories, Tylenol.  Call if symptoms worsen/change.  Advised follow-up with pain management for further treatment of pain.  Follow-up as needed.  Call with any questions or concerns.

## 2023-05-19 NOTE — PHYSICAL EXAM
[Left] : left knee [] : uses wheelchair [FreeTextEntry9] : Severe decreased range of motion consistent with patient's baseline/stiffness due to cerebral palsy [de-identified] : Patient's baseline shows instability consistent with cerebral palsy

## 2023-06-02 ENCOUNTER — APPOINTMENT (OUTPATIENT)
Dept: UROLOGY | Facility: CLINIC | Age: 68
End: 2023-06-02
Payer: MEDICARE

## 2023-06-02 ENCOUNTER — NON-APPOINTMENT (OUTPATIENT)
Age: 68
End: 2023-06-02

## 2023-06-02 DIAGNOSIS — T83.090D: ICD-10-CM

## 2023-06-02 PROCEDURE — 51700 IRRIGATION OF BLADDER: CPT

## 2023-06-02 PROCEDURE — A4216: CPT | Mod: NC

## 2023-06-02 PROCEDURE — 99212 OFFICE O/P EST SF 10 MIN: CPT | Mod: 25

## 2023-06-07 NOTE — ED ADULT NURSE NOTE - NSHISCREENINGQ1_ED_A_ED
No Peng Advancement Flap Text: The defect edges were debeveled with a #15 scalpel blade. Given the location of the defect, shape of the defect and the proximity to free margins a Peng advancement flap was deemed most appropriate. Using a sterile surgical marker, an appropriate advancement flap was drawn incorporating the defect and placing the expected incisions within the relaxed skin tension lines where possible. The area thus outlined was incised deep to adipose tissue with a #15 scalpel blade. The skin margins were undermined to an appropriate distance in all directions utilizing iris scissors. Following this, the designed flap was advanced and carried over into the primary defect and sutured into place.

## 2023-06-14 ENCOUNTER — APPOINTMENT (OUTPATIENT)
Dept: UROLOGY | Facility: CLINIC | Age: 68
End: 2023-06-14
Payer: MEDICARE

## 2023-06-14 PROCEDURE — 51705 CHANGE OF BLADDER TUBE: CPT

## 2023-06-17 NOTE — DISCHARGE NOTE PROVIDER - NSDCMRMEDTOKEN_GEN_ALL_CORE_FT
Artificial Tears ophthalmic solution: 1 drop(s) to each affected eye 4 times a day  baclofen 10 mg oral tablet: 1 tab(s) by gastrostomy tube 3 times a day  docusate sodium 60 mg/15 mL oral syrup: 100 milligram(s) by gastrostomy tube once a day, As Needed for constipation  guaifenesin-dextromethorphan 100 mg-10 mg/5 mL oral liquid: 5 milliliter(s) orally every 6 hours, As needed, Cough  MiraLax oral powder for reconstitution: 17 gram(s) by gastrostomy tube once a day   Oysco 500 (1250 mg calcium carbonate) oral tablet: 1 tab(s) by gastrostomy tube 2 times a day  PHENobarbital 64.8 mg oral tablet: 1 tab(s) orally once a day via G tube  senna oral tablet: 2 tab(s) orally once a day (at bedtime), As Needed -for constipation  via G tube   Ventolin HFA 90 mcg/inh inhalation aerosol: 2 puff(s) inhaled 4 times a day, As Needed Artificial Tears ophthalmic solution: 1 drop(s) to each affected eye 4 times a day  baclofen 10 mg oral tablet: 1 tab(s) by gastrostomy tube 3 times a day  benzonatate 100 mg oral capsule: 1 cap(s) orally every 8 hours, As needed, Cough  dicyclomine 10 mg oral capsule: 1 cap(s) orally once a day  docusate sodium 60 mg/15 mL oral syrup: 100 milligram(s) by gastrostomy tube once a day, As Needed for constipation  ertapenem 1 g injection: 1 gram(s) intravenously once a day   end date 6/8/2020  guaifenesin-dextromethorphan 100 mg-10 mg/5 mL oral liquid: 5 milliliter(s) orally every 6 hours, As needed, Cough  magnesium oxide 400 mg (241.3 mg elemental magnesium) oral tablet: 1 tab(s) orally 3 times a day (with meals)  MiraLax oral powder for reconstitution: 17 gram(s) by gastrostomy tube once a day   Oysco 500 (1250 mg calcium carbonate) oral tablet: 1 tab(s) by gastrostomy tube 2 times a day  PHENobarbital 64.8 mg oral tablet: 1 tab(s) orally once a day via G tube  senna oral tablet: 2 tab(s) orally once a day (at bedtime)  tamsulosin 0.4 mg oral capsule: 1 cap(s) orally once a day (at bedtime)  Ventolin HFA 90 mcg/inh inhalation aerosol: 2 puff(s) inhaled 4 times a day, As Needed contact guard Artificial Tears ophthalmic solution: 1 drop(s) to each affected eye 4 times a day  baclofen 10 mg oral tablet: 1 tab(s) by gastrostomy tube 3 times a day  benzonatate 100 mg oral capsule: 1 cap(s) orally every 8 hours, As needed, Cough  dicyclomine 10 mg oral capsule: 1 cap(s) orally once a day  docusate sodium 60 mg/15 mL oral syrup: 100 milligram(s) by gastrostomy tube once a day, As Needed for constipation  guaifenesin-dextromethorphan 100 mg-10 mg/5 mL oral liquid: 5 milliliter(s) orally every 6 hours, As needed, Cough  magnesium oxide 400 mg (241.3 mg elemental magnesium) oral tablet: 1 tab(s) orally 3 times a day (with meals)  MiraLax oral powder for reconstitution: 17 gram(s) by gastrostomy tube once a day   Oysco 500 (1250 mg calcium carbonate) oral tablet: 1 tab(s) by gastrostomy tube 2 times a day  PHENobarbital 64.8 mg oral tablet: 1 tab(s) orally once a day via G tube  senna oral tablet: 2 tab(s) orally once a day (at bedtime)  tamsulosin 0.4 mg oral capsule: 1 cap(s) orally once a day (at bedtime)  Ventolin HFA 90 mcg/inh inhalation aerosol: 2 puff(s) inhaled 4 times a day, As Needed

## 2023-06-19 NOTE — DIETITIAN INITIAL EVALUATION ADULT. - PHYSICAL APPEARANCE
Patient presents today in the pre-operative holding area for their scheduled surgery. They have been cleared and optimized for surgery by their medical provider. No new changes in medications, current condition, or ER visits since their last office visit. Risks, benefits, and alternatives to surgery were discussed again at length and all questions were answered. Proceed as planned with right total hip arthroplasty by Severino Perry DO.     
BS 16 no pressure ulcers. Pt is unsure of UBW since he doesn't weight himself. On last adm (4/6/19) pt weighed 117#. stable. BMI 22.4 IBW: 50.9 kg/well nourished

## 2023-07-11 NOTE — ED PROVIDER NOTE - CLINICAL SUMMARY MEDICAL DECISION MAKING FREE TEXT BOX
Assessment/Plan:  -Of note, Patient's lift for her wheelchair is still broken. Provided patient with updated letter indicating the medical necessity for repairs of the lift. Type 2 diabetes mellitus with stage 3 chronic kidney disease, with long-term current use of insulin (HCC)  - History of type 2 diabetes with long-term use of insulin with associated neuropathy and chronic kidney disease stage 3-4.  - Continue Toujeo 22 units daily and NovoLog 6 units, 3 times daily with meals.  - Continue checking daily blood sugars.  -Recommend annual diabetic eye exams. Encourage patient to schedule follow-up with eye doctor. Lab Results   Component Value Date    HGBA1C 5.7 02/28/2022    HGBA1C 5.7 09/29/2021    HGBA1C 6.0 02/15/2021       Insomnia  - Symptoms have improved. - Patient was prescribed trazodone, but it was then discontinued since patient had started taking nortriptyline. Due to side effects, patient has now stopped nortriptyline as well. - Will continue to monitor. Cervical radiculopathy  - Patient underwent C7-T1 interlaminar epidural steroid injection on 3/27/23 to address increased neck and upper extremity radicular component of her pain pattern. Patient found moderate relief.   -Continue follow-up with pain management as scheduled. Attention deficit hyperactivity disorder (ADHD)  - Stable. Currently taking Adderall 20 mg twice daily due to back order of Adderall XL 20 mg. Will restart adderall XL when it comes back in stock. - PDMP reviewed. No red flags noted. Neuropathy  - Patient had previously tried gabapentin, but it caused restless legs syndrome. Patient recently tried Cymbalta, but it caused side effect of dizziness, abdominal pain, vomiting.  Patient had tried Lyrica 100 mg, three times daily, however, patient has now stopped taking it due to low blood pressure readings.   -Patient recently started on nortriptyline 10 mg daily at bedtime by pain management, however patient experience side effects of nausea and vomiting so she is now stopped taking the medication.    -Reviewed EMG bilateral lower extremities from 6/5/2023. Results are normal.  - Patient underwent C7-T1 interlaminar epidural steroid injection on 3/27/23 to address increased neck and upper extremity radicular component of her pain pattern. - Patient is due for neurology follow-up, last seen in April 2021. Patient is scheduled for follow-up with neurology in September 2023. Gastroparesis  - Has placement of gastric stimulator, previously followed by Dennis Burns. - Currently, symptoms have been stable since patient has been very vigilant in watching her diet. Patient notes she has to stay away from salt and acid.  -Continue follow-up with gastroenterology as scheduled. Gastroesophageal reflux disease  - Stable. Continue Protonix 40 mg twice daily. - Continue to avoid trigger foods.  - Continue follow-up with gastroenterology as scheduled. Asthma  - Stable. Continue Symbicort twice daily, albuterol inhaler as needed, nebulizer as needed. Bipolar 1 disorder ([facility])  - Stable. Continue Wellbutrin  mg daily and Adderall 20 mg twice daily. MPA (microscopic polyangiitis) ([facility])  - Continue follow-up with rheumatology as scheduled. Chronic pain  - Continue oxycodone 15 mg, every 4 hours as needed. PDMP reviewed. No red flags noted. - UDS up to date. - Patient has now stopped taking Lyrica due to low blood pressure readings. Patient has stopped taking nortriptyline due to side effect of nausea and vomiting.  - Continue follow-up with pain management as scheduled. Chronic kidney disease, stage 4 (severe) ([facility])  Lab Results   Component Value Date    EGFR 28 03/28/2023    EGFR 36 02/19/2023    EGFR 31 02/03/2023    CREATININE 1.95 (H) 03/28/2023    CREATININE 1.60 (H) 02/19/2023    CREATININE 1.81 (H) 02/03/2023   - Continue follow-up with nephrology as scheduled.   - Continue to avoid NSAIDs/nephrotoxic medications. Diagnoses and all orders for this visit:    Cervical radiculopathy  -     cyclobenzaprine (FLEXERIL) 10 mg tablet; Take 1 tablet (10 mg total) by mouth 3 (three) times a day as needed for muscle spasms    Type 2 diabetes mellitus with stage 3 chronic kidney disease, with long-term current use of insulin, unspecified whether stage 3a or 3b CKD (HCC)  -     Hemoglobin A1C; Future    Insomnia, unspecified type    Attention deficit hyperactivity disorder (ADHD), unspecified ADHD type    Neuropathy    Gastroparesis    Gastroesophageal reflux disease, unspecified whether esophagitis present    Mild persistent asthma without complication    Bipolar 1 disorder (HCC)    MPA (microscopic polyangiitis) (HCC)    Chronic pain syndrome    Chronic kidney disease, stage 4 (severe) ([address])          All of patients questions were answered. Patient understands and agrees with the above plan. Return in about 3 months (around 10/11/2023) for Next scheduled follow up neck pain . Lonnie Almonte PA-C  07/11/23  [address] [facility] [place]          Subjective:     Patient ID: Jeremias Chua  is a 46 y.o. female with known PMH of gastroparesis, GERD, constipation, ADHD, type 2 diabetes mellitus, cataplexy, Wegener's granulomatosis, small fiber neuropathy, bipolar 1 disorder who presents today in office for follow up. - Patient is a 46 y.o. female who presents today for follow up. Patient notes for the past few days she has been experiencing increased pain of the left mid back area and radiates to the left chest area. Patient denies any known injury or trauma to the area. Patient notes she continues to take oxycodone as needed which does help. Patient notes she has been taking nortriptyline 10 mg which has been causing side effect of nausea, but she was still able to tolerate. Patient notes once she started taking nortriptyline 25 mg, it was causing both nausea and vomiting.   Patient notes it was causing her to have increased appetite so she was eating more than necessary which was causing her then to vomit. Therefore, patient notes she stopped taking nortriptyline about 2 weeks ago and her symptoms have now improved. Patient notes she is thinking about purchasing an antinausea watch. Patient notes she is in a gastroparesis support group and this is something that was recommended. The following portions of the patient's history were reviewed and updated as appropriate: allergies, current medications, past family history, past medical history, past social history, past surgical history and problem list.        Review of Systems   Constitutional: Positive for fatigue. Negative for appetite change, chills and fever. HENT: Negative for congestion and sore throat. Eyes: Negative for pain. Respiratory: Negative for cough, chest tightness and shortness of breath. Cardiovascular: Negative for chest pain and palpitations. Gastrointestinal: Negative for abdominal pain, constipation, diarrhea, nausea and vomiting. Genitourinary: Negative for difficulty urinating and dysuria. Musculoskeletal: Positive for arthralgias, back pain, myalgias and neck pain. Skin: Negative for rash. Neurological: Positive for weakness and numbness. Negative for dizziness. Psychiatric/Behavioral: Positive for sleep disturbance. The patient is not nervous/anxious. Objective:   Vitals:    07/11/23 1005   BP: 102/62   BP Location: Right arm   Patient Position: Sitting   Cuff Size: Standard   Pulse: 78   Resp: 18   Temp: 97.5 °F (36.4 °C)   TempSrc: Temporal   SpO2: 97%   Height: 5' 2" (1.575 m)         Physical Exam  Vitals and nursing note reviewed. Constitutional:       General: She is not in acute distress. Appearance: She is well-developed. Comments: Examined in wheelchair   HENT:      Head: Normocephalic and atraumatic.       Right Ear: External ear normal.      Left Ear: External ear normal. Nose: Nose normal.   Eyes:      Conjunctiva/sclera: Conjunctivae normal.   Cardiovascular:      Rate and Rhythm: Normal rate and regular rhythm. Pulses: Normal pulses. Heart sounds: Normal heart sounds. Pulmonary:      Effort: Pulmonary effort is normal. No respiratory distress. Breath sounds: Normal breath sounds. No wheezing. Musculoskeletal:      Cervical back: Normal range of motion and neck supple. Skin:     General: Skin is warm and dry. Neurological:      Mental Status: She is alert and oriented to person, place, and time.    Psychiatric:         Behavior: Behavior normal. pt with history as above presenting with flank pain- labs imaging reviewed. pt with stone, +UTI. given abx. sp urology eval. will admit to medicine for further eval.

## 2023-07-12 ENCOUNTER — APPOINTMENT (OUTPATIENT)
Dept: UROLOGY | Facility: CLINIC | Age: 68
End: 2023-07-12
Payer: MEDICARE

## 2023-07-12 DIAGNOSIS — R33.9 RETENTION OF URINE, UNSPECIFIED: ICD-10-CM

## 2023-07-12 PROCEDURE — 51705 CHANGE OF BLADDER TUBE: CPT

## 2023-08-15 ENCOUNTER — APPOINTMENT (OUTPATIENT)
Dept: UROLOGY | Facility: CLINIC | Age: 68
End: 2023-08-15
Payer: MEDICARE

## 2023-08-15 PROCEDURE — 51705 CHANGE OF BLADDER TUBE: CPT

## 2023-09-12 ENCOUNTER — APPOINTMENT (OUTPATIENT)
Dept: UROLOGY | Facility: CLINIC | Age: 68
End: 2023-09-12
Payer: MEDICARE

## 2023-09-12 PROCEDURE — 51705 CHANGE OF BLADDER TUBE: CPT

## 2023-10-04 NOTE — ED PROVIDER NOTE - EYES NEGATIVE STATEMENT, MLM
Chief Complaint   Patient presents with   • Annual Exam     Pt is here for pe and fbw.       Patient has given consent to record this visit for documentation in their clinical record.     Briana Staples is a 29 year old female who is here for annual physical examination.    HPI:  Historian: Self.    1. Healthcare maintenance:  Cervical cancer screening: Due for pap. Deferred the referral.  Breast cancer screening: No family history of breast cancer.   Immunization: Up-to-date for tetanus, pneumonia. Due for influenza and COVID booster vaccine. She was infected with COVID-19 in 2023, currently, recovered.   Screening labs: Due.  Previous labs show normal WBC, anemia test, platelet count. Normal sodium, potassium, chloride, kidney, urine, thyroid, and liver test. Normal blood glucose at 92 mg/dL normal cholesterol and triglycerides.      2. Anxiety:  Is on Escitalopram (LEXAPRO) 10 MG tablet, once daily. Doing well with it. Her anxiety is under control. Requests for refills.     3. Allergic rhinitis, unspecified seasonality, unspecified trigger:  Is using Montelukast (SINGULAIR) 10 MG tablet as needed for allergies. She is stable currently. Requests for refills.       4. Mild intermittent asthma without complication:  Has a history of asthma. Occasionally using Albuterol. Denies chest tightness.   Is on  -Budesonide-formoterol (Symbicort) 160-4.5 MCG/ACT inhaler, 1 puff in the morning. Requests for refills.   -Albuterol (ProAir HFA) 108 (90 Base) MCG/ACT inhaler as needed for Shortness of Breath or Wheezing. Requests for refills.       5. Vitamin D deficiency:  Her previous vitamin D was at 37 ng/dL.       ROS  Constitutional: Negative for chills, decreased appetite, fever and malaise/fatigue.  HENT: Positive for allergies.  Eyes: Negative for discharge, redness and visual disturbance.  Cardiovascular: Negative for chest pain, dyspnea on exertion, leg swelling and palpitations.  Respiratory: Positive for  allergies.  Endocrine: Negative for polyuria.  Hematologic/Lymphatic: Negative for adenopathy.  Skin: Negative for itching, rash and suspicious lesions.  Musculoskeletal: Negative for back pain, joint pain, joint swelling and muscle weakness.  Gastrointestinal: Negative for abdominal pain, heartburn, nausea and vomiting.  Genitourinary: Negative for bladder incontinence, dysuria, flank pain, frequency and urgency.  Neurological: Negative for dizziness, headaches and numbness.  Psychiatric/Behavioral: Positive for anxiety.         ALLERGIES:   Allergen Reactions   • Penicillins RASH       Current Outpatient Medications   Medication Sig Dispense Refill   • albuterol (ProAir HFA) 108 (90 Base) MCG/ACT inhaler Inhale 2 puffs into the lungs every 4 hours as needed for Shortness of Breath or Wheezing. 1 each 1   • escitalopram (LEXAPRO) 10 MG tablet Take 1 tablet by mouth daily. 90 tablet 1   • budesonide-formoterol (Symbicort) 160-4.5 MCG/ACT inhaler 1 puff twice daily.  Rinse mouth after use 3 each 1   • montelukast (SINGULAIR) 10 MG tablet Take 1 tablet by mouth nightly. 90 tablet 1   • IUD'S IU      • loratadine (CLARITIN) 10 MG tablet Take 10 mg by mouth daily.       No current facility-administered medications for this visit.       Patient Active Problem List   Diagnosis   • Anxiety   • Mild intermittent asthma without complication   • Allergic rhinitis   • Vitamin D deficiency       Past Medical History:   Diagnosis Date   • Bronchospastic airway disease 5/21/2019   • No known problems        Past Surgical History:   Procedure Laterality Date   • Tonsillectomy     • [place] tooth extraction         Family History   Problem Relation Age of Onset   • Rheumatoid Arthritis Mother    • Patient is unaware of any medical problems Sister    • Patient is unaware of any medical problems Sister    • Asthma Sister    • Dementia/Alzheimers Maternal Grandmother    • Diabetes Maternal Grandmother    • Congestive Heart Failure  Maternal Grandfather    • Diabetes Maternal Grandfather    • Congestive Heart Failure Paternal Grandfather    • Diabetes Paternal Grandfather        Social History     Socioeconomic History   • Marital status: Single     Spouse name: Not on file   • Number of children: Not on file   • Years of education: Not on file   • Highest education level: Not on file   Occupational History   • Not on file   Tobacco Use   • Smoking status: Never   • Smokeless tobacco: Never   Vaping Use   • Vaping Use: never used   Substance and Sexual Activity   • Alcohol use: Not Currently   • Drug use: Never   • Sexual activity: Yes     Partners: Male   Other Topics Concern   • Not on file   Social History Narrative   • Not on file     Social Determinants of Health     Financial Resource Strain: Not on file   Food Insecurity: Not on file   Transportation Needs: Not on file   Physical Activity: Inactive (2/2/2023)    Exercise Vital Sign    • Days of Exercise per Week: 0 days    • Minutes of Exercise per Session: 0 min   Stress: Not on file   Social Connections: Not on file   Intimate Partner Violence: Not on file       Visit Vitals  /60 (BP Location: RUE - Right upper extremity, Patient Position: Sitting, Cuff Size: Large Adult)   Pulse 88   Temp 97.8 °F (36.6 °C) (Temporal)   Resp (!) 12   Ht 5' 2\" (1.575 m)   Wt 70.3 kg (155 lb)   SpO2 99%   BMI 28.35 kg/m²       Physical Exam     Vitals and nursing notes reviewed.  PE summary: Blood pressure rechecked by me in the office today is102/60 mmHg.  Constitutional:  Appearance: Normal appearance. She is overweight.   HENT:  Head: Normocephalic.  Right Ear: Tympanic membrane and external ear normal.  Left Ear: Tympanic membrane and external ear normal.  Nose: Nose normal.  Eyes:  Extraocular Movements: Extraocular movements intact.  Conjunctiva/sclera: Conjunctiva normal.  Pupils: Pupils are equal, round, and reactive to light.  Cardiovascular:  Rate and Rhythm: Normal rate and regular  rhythm.  Heart sounds: Normal heart sounds. No murmurs heard.  Pulmonary:  Effort: Pulmonary effort is normal.  Breath sounds: Normal breath sounds.  Abdominal:  General: Bowel sounds are normal.  Palpitations: Abdomen is soft. Spleen and liver are normal size.   Musculoskeletal:  General: Normal range of motion.  Cervical back: Normal range of motion and neck supple.  Skin:  General: Skin is warm.  Neurological:  Mental Status: She is alert and oriented to person, place, and time.  Sensory: No sensory deficit.      PLAN:    1. Healthcare maintenance:  Reviewed and discussed previous labs.  Ordered:  - CBC with Automated Differential  - Comprehensive Metabolic Panel  - Lipid Panel Without Reflex  - Thyroid Stimulating Hormone  - Urinalysis With Microscopy Exam W/O C/S.  Recommended administering Covid-19 booster vaccine if needed at the pharmacy.  Suggested to consult a gynecologist for pap.  Guidelines for colonoscopy, and mammogram were explained.    2. Anxiety:  Ordered:  - CBC with Automated Differential  - Comprehensive Metabolic Panel  - Lipid Panel Without Reflex  - Thyroid Stimulating Hormone  - Urinalysis With Microscopy Exam W/O C/S  Continue Escitalopram (LEXAPRO) 10 MG tablet; Take 1 tablet by mouth daily. Dispense: 90 tablet; Refill: 1    3. Allergic rhinitis, unspecified seasonality, unspecified trigger:  Currently, the condition is well controlled.  Ordered:  - CBC with Automated Differential  - Comprehensive Metabolic Panel  - Lipid Panel Without Reflex  - Thyroid Stimulating Hormone  - Urinalysis With Microscopy Exam W/O C/S  Continue:  -Albuterol (ProAir HFA) 108 (90 Base) MCG/ACT inhaler, Inhale 2 puffs into the lungs every 4 hours as needed for Shortness of Breath or Wheezing. Refills provided.  -Budesonide-formoterol (Symbicort) 160-4.5 MCG/ACT inhaler, 1 puff twice daily. Rinse mouth after use. Refills provided.  -Montelukast (SINGULAIR) 10 MG tablet, Take 1 tablet by mouth nightly. Refills  provided.    4. Mild intermittent asthma without complication:  Ordered:  - CBC with Automated Differential  - Comprehensive Metabolic Panel  - Lipid Panel Without Reflex  - Thyroid Stimulating Hormone  - Urinalysis With Microscopy Exam W/O C/S    5. Vitamin D deficiency:  Ordered:  - CBC with Automated Differential  - Comprehensive Metabolic Panel  - Lipid Panel Without Reflex  - Thyroid Stimulating Hormone  - Urinalysis With Microscopy Exam W/O C/S      Return in about 6 months (around 4/3/2024) for MED CHECK .      Follow up in the office as needed    Medical compliance with plan discussed and risks of non-compliance reviewed.    Patient education completed on disease process, etiology & prognosis.    Patient expresses understanding of the plan.    Proper usage and side effects of medications reviewed & discussed.    Refer to orders.    Return to clinic as clinically indicated as discussed with patient who verbalized understanding of & agreement with the plan.     I, Sola Coreas, have created a visit summary document based on the audio recording between Dr. Francis Thompson MD and this patient for the physician to review, edit as needed, and authenticate.  Creation Date: 10/3/2023     I have reviewed and edited the visit summary above and attest that it is accurate.         no discharge, no irritation, no pain, no redness, and no visual changes.

## 2023-10-05 NOTE — ED PROVIDER NOTE - NS ED MD EM SELECTION
Pt stopped by the office stating Dr. [facility] did not receive his referral. Pt is requesting for the referral to be resent. Please advise.  Thank you!!!
Referral re-faxed per request     [facility]   Dr. Shabana Martino  [address] [facility] [address] [place]  [phone] phone  [phone] fax
[id number] Comprehensive

## 2023-10-11 ENCOUNTER — APPOINTMENT (OUTPATIENT)
Dept: UROLOGY | Facility: CLINIC | Age: 68
End: 2023-10-11
Payer: MEDICARE

## 2023-10-11 PROCEDURE — 51705 CHANGE OF BLADDER TUBE: CPT

## 2023-10-19 NOTE — ED ADULT NURSE NOTE - TEMPLATE LIST FOR HEAD TO TOE ASSESSMENT
General Clofazimine Counseling:  I discussed with the patient the risks of clofazimine including but not limited to skin and eye pigmentation, liver damage, nausea/vomiting, gastrointestinal bleeding and allergy.

## 2023-11-02 NOTE — PHYSICAL EXAM
Virtual Visit Details    Type of service:  Video Visit       Originating Location (pt. Location): Home    Distant Location (provider location):  On-site  Platform used for Video Visit: Jesús    CC: Desires sterilization, consult for vasectomy.    HPI: Edwardo Wayne is a 20 year old male with no children, and he is intersted in getting a vasectomy for sterilization.  He has been thinking about vasectomy and researching this for years.     [facility]? Yes, [facility]    PMH:   No chronic medical problems  Cataract surgery age 10.    FAMILY HX:   No history of coagulopathies.    ALLERGIES:    No Known Allergies    MEDS:   No prescription medications       SOCIAL HISTORY:  Social History     Tobacco Use    Smoking status: Never    Smokeless tobacco: Never        GENERAL PHYSICAL EXAM:   General- Alert, oriented, nad.  Pleasant and conversant.  Eyes- anicteric, EOMI.  Resps- normal, non-labored.  No cough  Abdomen-  nondistended.   exam- deferred.   Neurological - no tremors  Skin - no discoloration/ lesions noted  Psychiatric - no anxiety, alert & oriented.       The rest of a comprehensive physical examination is deferred due to video visit restrictions.       I discussed with him at length the risks and benefits of the procedure. He understands that this is a sterilization procedure, and not reversible contraception. He understands that reversals, while possible, are not guaranteed to work and fairly complex. I discussed with him the option of sperm cryopreservation.     I stressed that he continues to be fertile in the post-operative period, and that he should continue using other contraceptive methods, such as a condom, until he obtains a semen analysis and we review the results to confirm success of the procedure and infertility. I also stressed to him that recanalization and pregnancy can occur in about 1 per thousand cases, possibly more even after we clear him with a semen analysis showing no  motile sperm. I counseled him on the evelyn-operative risks of bleeding, infection, pain.  I described to him post-vasectomy pain syndrome that can occur in about 1 to 2% of men undergoing vasectomy.     We also discussed recovery times (typically days if no complications) and post-operative care including use of ice packs, pain medication and wound care.      Plan:  Schedule vasectomy procedure in clinic.   He would like to talk to finance office   [facility]eds [facility] [place] [facility] [place] for [facility]      Additional Coding Information:    Problems:  One acute uncomplicated illness or injury    Data Reviewed  Minimal or none    Level of risk:   low risk (e.g., OTC medication or observation, minor surgery without risks)    Time spent:  13 minutes spent on the date of the encounter doing chart review, history and exam, documentation and further activities as noted above. This included the video chat time above.   [General Appearance - Well Developed] : well developed [General Appearance - Well Nourished] : well nourished [Normal Appearance] : normal appearance [Well Groomed] : well groomed [General Appearance - In No Acute Distress] : no acute distress [Abdomen Soft] : soft [Abdomen Tenderness] : non-tender [Costovertebral Angle Tenderness] : no ~M costovertebral angle tenderness [Edema] : no peripheral edema [Respiration, Rhythm And Depth] : normal respiratory rhythm and effort [Exaggerated Use Of Accessory Muscles For Inspiration] : no accessory muscle use [] : no rash [Motor Strength Hips Right Weakness] : hip weakness was present [Motor Strength Knee Right Weakness] : knee weakness was present [Motor Strength Ankle Right Weakness] : ankle weakness was present [No Palpable Adenopathy] : no palpable adenopathy [FreeTextEntry1] : CP ambulating in wheelchair

## 2023-11-14 ENCOUNTER — APPOINTMENT (OUTPATIENT)
Dept: UROLOGY | Facility: CLINIC | Age: 68
End: 2023-11-14
Payer: MEDICARE

## 2023-11-14 PROCEDURE — 51705 CHANGE OF BLADDER TUBE: CPT

## 2023-11-16 ENCOUNTER — OUTPATIENT (OUTPATIENT)
Dept: OUTPATIENT SERVICES | Facility: HOSPITAL | Age: 68
LOS: 1 days | End: 2023-11-16
Payer: MEDICARE

## 2023-11-16 DIAGNOSIS — Z93.59 OTHER CYSTOSTOMY STATUS: Chronic | ICD-10-CM

## 2023-11-16 DIAGNOSIS — K02.9 DENTAL CARIES, UNSPECIFIED: ICD-10-CM

## 2023-11-16 DIAGNOSIS — Z98.890 OTHER SPECIFIED POSTPROCEDURAL STATES: Chronic | ICD-10-CM

## 2023-11-16 DIAGNOSIS — Z93.1 GASTROSTOMY STATUS: Chronic | ICD-10-CM

## 2023-11-16 PROCEDURE — D0170: CPT

## 2023-11-16 PROCEDURE — D0140: CPT

## 2023-11-16 PROCEDURE — D0220: CPT

## 2023-11-28 ENCOUNTER — OUTPATIENT (OUTPATIENT)
Dept: OUTPATIENT SERVICES | Facility: HOSPITAL | Age: 68
LOS: 1 days | End: 2023-11-28
Payer: MEDICARE

## 2023-11-28 ENCOUNTER — APPOINTMENT (OUTPATIENT)
Dept: NEPHROLOGY | Facility: CLINIC | Age: 68
End: 2023-11-28
Payer: MEDICARE

## 2023-11-28 VITALS
BODY MASS INDEX: 21.26 KG/M2 | DIASTOLIC BLOOD PRESSURE: 68 MMHG | TEMPERATURE: 98 F | SYSTOLIC BLOOD PRESSURE: 111 MMHG | HEIGHT: 63 IN | WEIGHT: 120 LBS | OXYGEN SATURATION: 95 % | HEART RATE: 68 BPM

## 2023-11-28 DIAGNOSIS — Z00.00 ENCOUNTER FOR GENERAL ADULT MEDICAL EXAMINATION WITHOUT ABNORMAL FINDINGS: ICD-10-CM

## 2023-11-28 DIAGNOSIS — N20.0 CALCULUS OF KIDNEY: ICD-10-CM

## 2023-11-28 DIAGNOSIS — Z98.890 OTHER SPECIFIED POSTPROCEDURAL STATES: Chronic | ICD-10-CM

## 2023-11-28 PROCEDURE — 99204 OFFICE O/P NEW MOD 45 MIN: CPT

## 2023-11-28 PROCEDURE — 99214 OFFICE O/P EST MOD 30 MIN: CPT

## 2023-11-29 DIAGNOSIS — N20.0 CALCULUS OF KIDNEY: ICD-10-CM

## 2023-11-29 DIAGNOSIS — R32 UNSPECIFIED URINARY INCONTINENCE: ICD-10-CM

## 2023-11-29 DIAGNOSIS — R82.992 HYPEROXALURIA: ICD-10-CM

## 2023-11-29 DIAGNOSIS — N40.1 BENIGN PROSTATIC HYPERPLASIA WITH LOWER URINARY TRACT SYMPTOMS: ICD-10-CM

## 2023-12-12 ENCOUNTER — APPOINTMENT (OUTPATIENT)
Dept: UROLOGY | Facility: CLINIC | Age: 68
End: 2023-12-12
Payer: MEDICARE

## 2023-12-12 PROCEDURE — 51705 CHANGE OF BLADDER TUBE: CPT

## 2023-12-21 ENCOUNTER — APPOINTMENT (OUTPATIENT)
Dept: UROLOGY | Facility: CLINIC | Age: 68
End: 2023-12-21

## 2024-01-01 NOTE — ED ADULT TRIAGE NOTE - MODE OF ARRIVAL
Victoriano Baxter is a 2 month old female with a past medical history of 34w4d  birth, atrial septal defect (ASD), ventricular septal defect (VSD), bicuspid aortic valve, pulmonary hypertension, and chronic heart failure. She was initially admitted 24 for poor weight gain and poor feeding in the setting of heart failure. She is status post ventricular septal defect patch closure and direct patent foramen ovale closure on 24.     Key Events:  24: VSD patch closure and direct closure of ASD; extubated the evening following surgery     Post-operative course:  [facility]: [facility] infusions of milrinone and nicarpidine, and ultimately transitioned to enalapril for afterload reduction in the setting of hypertension.  Respiratory: She remained intubated post-operatively and was extubated the evening following surgery and weaned to low flow nasal cannula on POD 2. An attempt was made on POD to wean her to room air, and she desaturated and remained on 0.5 Lpm at the time of transfer.  FEN/GI: Patient was started on diuresis post-operatively and continued on IV dosing at the time of transfer as the chest tube output remained too high for removal. Pre-operative feedings were re-initiated post operatively and she required an NG tube placement to supplement her oral intake.  ID: Patient completed a post-operative course of prophylactic antibiotics.   Pain & Sedation: Patient was smoothly transitioned from intravenous to oral pain medication and pain was well controlled prior to transfer.   Social: Family at the bedside throughout the hospital stay and regularly involved in patient care.     
Walk in

## 2024-01-10 NOTE — ED ADULT NURSE NOTE - NS_NURSE_DISC_TEACHING_YN_ED_ALL_ED
I have reviewed the notes, assessments, and/or procedures performed by Moise Flores, I concur with her/his documentation of Yon Ochoa.    
Yes

## 2024-01-19 NOTE — ED PROVIDER NOTE - NS ED NOTE AC HIGH RISK COUNTRIES
Elevate is much as possible, bacitracin to wound twice a day, keep clean and dry.  Clindamycin 300 mg 3 times a day for 7 days, Diflucan if needed for yeast infection.  Follow-up with your primary care doctor within the next 24 hours to recheck blood pressure and symptoms.  We discussed the elevated protein and low albumin in your blood.  Motrin every 6 hours as needed for pain, Tylenol every 4 hours if needed for pain.  Return for uncontrolled pain spreading redness swelling fever change in symptoms.  
No

## 2024-01-20 ENCOUNTER — RX RENEWAL (OUTPATIENT)
Age: 69
End: 2024-01-20

## 2024-01-24 ENCOUNTER — APPOINTMENT (OUTPATIENT)
Dept: UROLOGY | Facility: CLINIC | Age: 69
End: 2024-01-24
Payer: MEDICARE

## 2024-01-24 PROCEDURE — 51705 CHANGE OF BLADDER TUBE: CPT

## 2024-01-29 NOTE — H&P ADULT - NSHPRISKHIVSCREEN_GEN_ALL_CORE
Prior Authorization Retail Medication Request    Medication/Dose: Phentermine 15 mg caps  Diagnosis and ICD code (if different than what is on RX):  E066.09, Z68.36  New/renewal/insurance change PA/secondary ins. PA:  Previously Tried and Failed:  topiramate 25 mg  Rationale:  Failed    Insurance   Primary: [employer] - [employer]   Insurance ID:    [id number]         Pharmacy Information (if different than what is on RX)  Name:  [facility]  Phone:  [phone]  Fax:[phone]     Unable to offer due to clinical condition

## 2024-01-30 RX ORDER — POTASSIUM CITRATE AND CITRIC ACID 334; 1100 MG/5ML; MG/5ML
1100-334 SOLUTION ORAL 3 TIMES DAILY
Qty: 1500 | Refills: 5 | Status: ACTIVE | COMMUNITY
Start: 2022-05-24 | End: 1900-01-01

## 2024-02-28 ENCOUNTER — APPOINTMENT (OUTPATIENT)
Dept: UROLOGY | Facility: CLINIC | Age: 69
End: 2024-02-28
Payer: MEDICARE

## 2024-02-28 PROCEDURE — 51705 CHANGE OF BLADDER TUBE: CPT

## 2024-03-27 ENCOUNTER — APPOINTMENT (OUTPATIENT)
Dept: UROLOGY | Facility: CLINIC | Age: 69
End: 2024-03-27
Payer: MEDICARE

## 2024-03-27 PROCEDURE — 51705 CHANGE OF BLADDER TUBE: CPT

## 2024-04-03 ENCOUNTER — OUTPATIENT (OUTPATIENT)
Dept: OUTPATIENT SERVICES | Facility: HOSPITAL | Age: 69
LOS: 1 days | End: 2024-04-03
Payer: MEDICARE

## 2024-04-03 VITALS
RESPIRATION RATE: 18 BRPM | HEART RATE: 69 BPM | OXYGEN SATURATION: 96 % | WEIGHT: 176.37 LBS | DIASTOLIC BLOOD PRESSURE: 63 MMHG | HEIGHT: 68.9 IN | SYSTOLIC BLOOD PRESSURE: 121 MMHG | TEMPERATURE: 99 F

## 2024-04-03 DIAGNOSIS — Z93.59 OTHER CYSTOSTOMY STATUS: Chronic | ICD-10-CM

## 2024-04-03 DIAGNOSIS — Z98.890 OTHER SPECIFIED POSTPROCEDURAL STATES: Chronic | ICD-10-CM

## 2024-04-03 DIAGNOSIS — Z93.1 GASTROSTOMY STATUS: Chronic | ICD-10-CM

## 2024-04-03 DIAGNOSIS — K02.9 DENTAL CARIES, UNSPECIFIED: ICD-10-CM

## 2024-04-03 DIAGNOSIS — Z01.818 ENCOUNTER FOR OTHER PREPROCEDURAL EXAMINATION: ICD-10-CM

## 2024-04-03 LAB
ALBUMIN SERPL ELPH-MCNC: 4.2 G/DL — SIGNIFICANT CHANGE UP (ref 3.5–5.2)
ALP SERPL-CCNC: 82 U/L — SIGNIFICANT CHANGE UP (ref 30–115)
ALT FLD-CCNC: 10 U/L — SIGNIFICANT CHANGE UP (ref 0–41)
ANION GAP SERPL CALC-SCNC: 11 MMOL/L — SIGNIFICANT CHANGE UP (ref 7–14)
APTT BLD: 31.4 SEC — SIGNIFICANT CHANGE UP (ref 27–39.2)
AST SERPL-CCNC: 14 U/L — SIGNIFICANT CHANGE UP (ref 0–41)
BASOPHILS # BLD AUTO: 0.03 K/UL — SIGNIFICANT CHANGE UP (ref 0–0.2)
BASOPHILS NFR BLD AUTO: 0.5 % — SIGNIFICANT CHANGE UP (ref 0–1)
BILIRUB SERPL-MCNC: <0.2 MG/DL — SIGNIFICANT CHANGE UP (ref 0.2–1.2)
BUN SERPL-MCNC: 15 MG/DL — SIGNIFICANT CHANGE UP (ref 10–20)
CALCIUM SERPL-MCNC: 9.2 MG/DL — SIGNIFICANT CHANGE UP (ref 8.4–10.5)
CHLORIDE SERPL-SCNC: 103 MMOL/L — SIGNIFICANT CHANGE UP (ref 98–110)
CO2 SERPL-SCNC: 27 MMOL/L — SIGNIFICANT CHANGE UP (ref 17–32)
CREAT SERPL-MCNC: 0.6 MG/DL — LOW (ref 0.7–1.5)
EGFR: 105 ML/MIN/1.73M2 — SIGNIFICANT CHANGE UP
EOSINOPHIL # BLD AUTO: 0.3 K/UL — SIGNIFICANT CHANGE UP (ref 0–0.7)
EOSINOPHIL NFR BLD AUTO: 4.6 % — SIGNIFICANT CHANGE UP (ref 0–8)
GLUCOSE SERPL-MCNC: 88 MG/DL — SIGNIFICANT CHANGE UP (ref 70–99)
HCT VFR BLD CALC: 39.4 % — LOW (ref 42–52)
HGB BLD-MCNC: 13.4 G/DL — LOW (ref 14–18)
IMM GRANULOCYTES NFR BLD AUTO: 0.3 % — SIGNIFICANT CHANGE UP (ref 0.1–0.3)
INR BLD: 1 RATIO — SIGNIFICANT CHANGE UP (ref 0.65–1.3)
LYMPHOCYTES # BLD AUTO: 1.83 K/UL — SIGNIFICANT CHANGE UP (ref 1.2–3.4)
LYMPHOCYTES # BLD AUTO: 28.1 % — SIGNIFICANT CHANGE UP (ref 20.5–51.1)
MCHC RBC-ENTMCNC: 29.6 PG — SIGNIFICANT CHANGE UP (ref 27–31)
MCHC RBC-ENTMCNC: 34 G/DL — SIGNIFICANT CHANGE UP (ref 32–37)
MCV RBC AUTO: 87 FL — SIGNIFICANT CHANGE UP (ref 80–94)
MONOCYTES # BLD AUTO: 0.62 K/UL — HIGH (ref 0.1–0.6)
MONOCYTES NFR BLD AUTO: 9.5 % — HIGH (ref 1.7–9.3)
NEUTROPHILS # BLD AUTO: 3.71 K/UL — SIGNIFICANT CHANGE UP (ref 1.4–6.5)
NEUTROPHILS NFR BLD AUTO: 57 % — SIGNIFICANT CHANGE UP (ref 42.2–75.2)
NRBC # BLD: 0 /100 WBCS — SIGNIFICANT CHANGE UP (ref 0–0)
PLATELET # BLD AUTO: 233 K/UL — SIGNIFICANT CHANGE UP (ref 130–400)
PMV BLD: 10.5 FL — HIGH (ref 7.4–10.4)
POTASSIUM SERPL-MCNC: 4.4 MMOL/L — SIGNIFICANT CHANGE UP (ref 3.5–5)
POTASSIUM SERPL-SCNC: 4.4 MMOL/L — SIGNIFICANT CHANGE UP (ref 3.5–5)
PROT SERPL-MCNC: 6.8 G/DL — SIGNIFICANT CHANGE UP (ref 6–8)
PROTHROM AB SERPL-ACNC: 11.4 SEC — SIGNIFICANT CHANGE UP (ref 9.95–12.87)
RBC # BLD: 4.53 M/UL — LOW (ref 4.7–6.1)
RBC # FLD: 13 % — SIGNIFICANT CHANGE UP (ref 11.5–14.5)
SODIUM SERPL-SCNC: 141 MMOL/L — SIGNIFICANT CHANGE UP (ref 135–146)
WBC # BLD: 6.51 K/UL — SIGNIFICANT CHANGE UP (ref 4.8–10.8)
WBC # FLD AUTO: 6.51 K/UL — SIGNIFICANT CHANGE UP (ref 4.8–10.8)

## 2024-04-03 PROCEDURE — 85025 COMPLETE CBC W/AUTO DIFF WBC: CPT

## 2024-04-03 PROCEDURE — 85610 PROTHROMBIN TIME: CPT

## 2024-04-03 PROCEDURE — 93005 ELECTROCARDIOGRAM TRACING: CPT

## 2024-04-03 PROCEDURE — 93010 ELECTROCARDIOGRAM REPORT: CPT

## 2024-04-03 PROCEDURE — 80053 COMPREHEN METABOLIC PANEL: CPT

## 2024-04-03 PROCEDURE — 36415 COLL VENOUS BLD VENIPUNCTURE: CPT

## 2024-04-03 PROCEDURE — 85730 THROMBOPLASTIN TIME PARTIAL: CPT

## 2024-04-03 PROCEDURE — 99214 OFFICE O/P EST MOD 30 MIN: CPT | Mod: 25

## 2024-04-03 RX ORDER — PHENOBARBITAL 60 MG
1 TABLET ORAL
Qty: 0 | Refills: 0 | DISCHARGE

## 2024-04-03 RX ORDER — CALCIUM CARBONATE 500(1250)
1 TABLET ORAL
Qty: 0 | Refills: 0 | DISCHARGE

## 2024-04-03 RX ORDER — OMEPRAZOLE 10 MG/1
1 CAPSULE, DELAYED RELEASE ORAL
Qty: 0 | Refills: 0 | DISCHARGE

## 2024-04-03 RX ORDER — DOCUSATE SODIUM 100 MG
30 CAPSULE ORAL
Qty: 0 | Refills: 0 | DISCHARGE

## 2024-04-03 RX ORDER — MICONAZOLE NITRATE 2 %
1 CREAM (GRAM) TOPICAL
Qty: 0 | Refills: 0 | DISCHARGE

## 2024-04-03 RX ORDER — LANOLIN/MINERAL OIL
1 LOTION (ML) TOPICAL
Qty: 0 | Refills: 0 | DISCHARGE

## 2024-04-03 RX ORDER — LACTULOSE 10 G/15ML
15 SOLUTION ORAL
Qty: 0 | Refills: 0 | DISCHARGE

## 2024-04-03 RX ORDER — POLYETHYLENE GLYCOL 3350 17 G/17G
0 POWDER, FOR SOLUTION ORAL
Qty: 0 | Refills: 0 | DISCHARGE

## 2024-04-03 RX ORDER — SOD,AMMONIUM,POTASSIUM LACTATE
1 CREAM (GRAM) TOPICAL
Qty: 0 | Refills: 0 | DISCHARGE

## 2024-04-03 NOTE — H&P PST ADULT - NSICDXPASTMEDICALHX_GEN_ALL_CORE_FT
4
PAST MEDICAL HISTORY:  Asthma     BPH (benign prostatic hyperplasia)     Cerebral palsy     Osteoporosis     Seizure last seizure >10 years ago    Spastic quadriplegia     Urinary calculi     Urinary retention

## 2024-04-03 NOTE — H&P PST ADULT - HISTORY OF PRESENT ILLNESS
PT PRESENTS IN WHEELCHAIR FROM GROUP HOME ALERT AND ORIENTED IN NO DISTRESS; PEG AND SUPRAPUBIC TUBE IN PLACE    PATIENT CURRENTLY DENIES CHEST PAIN  SHORTNESS OF BREATH  PALPITATIONS,  DYSURIA, OR UPPER RESPIRATORY INFECTION IN PAST 2 WEEKS      Denies travel outside the [place] in the past 30 days  Patient denies any signs or symptoms of COVID 19 and denies contact with known positive individuals.         Anesthesia Alert  YES--Difficult Airway CLASS IV  NO--History of neck surgery or radiation  NO--Limited ROM of neck  NO--History of Malignant hyperthermia  NO--No personal or family history of Pseudocholinesterase deficiency.  NO--Prior Anesthesia Complication  NO--Latex Allergy  NO--Loose teeth  NO--History of Rheumatoid Arthritis  NO--Bleeding risk  NO--BARBIE  NO--Other_____    Revised Cardiac Risk Index for Pre-Operative Risk   RESULT SUMMARY:  0 points  Class I Risk    3.9 %  30-day risk of death, MI, or cardiac arrest    INPUTS:  Elevated-risk surgery —> 0 = No  History of ischemic heart disease —> 0 = No  History of congestive heart failure —> 0 = No  History of cerebrovascular disease —> 0 = No  Pre-operative treatment with insulin —> 0 = No  Pre-operative creatinine >2 mg/dL / 176.8 µmol/L —> 0 = No    D[facility] Activity Status Index (DASI)   RESULT SUMMARY:  0 points  The higher the score (maximum 58.2), the higher the functional status.    2.74 METs    INPUTS:  Take care of self —> 0 = No  Walk indoors —> 0 = No  Walk 1&ndash;2 blocks on level ground —> 0 = No  Climb a flight of stairs or walk up a hill —> 0 = No  Run a short distance —> 0 = No  Do light work around the house —> 0 = No  Do moderate work around the house —> 0 = No  Do heavy work around the house —> 0 = No  Do yardwork —> 0 = No  Have sexual relations —> 0 = No  Participate in moderate recreational activities —> 0 = No  Participate in strenuous sports —> 0 = No          PT DENIES ANY RASHES, ABRASION, OR OPEN WOUNDS OR CUTS    AS PER THE PT, THIS IS HIS/HER COMPLETE MEDICAL AND SURGICAL HX, INCLUDING MEDICATIONS PRESCRIBED AND OVER THE COUNTER    Patient verbalized understanding of instructions and was given the opportunity to ask questions and have them answered.    pt denies any suicidal ideation or thoughts, pt states not a threat to self or others

## 2024-04-03 NOTE — H&P PST ADULT - REASON FOR ADMISSION
67 Y/O M WITH PMHX CEREBRAL PALSY/SPASTIC QUADRIPLEGIA, WHEELCHAIR BOUND, SEIZURES (LAST SZ MANY YEARS AGO), BPH, SCHEDULED FOR PAST FOR complete oral rehabilitation under general anesthesia ON 4/12/24 WITH DR DOBBINS.

## 2024-04-04 DIAGNOSIS — Z01.818 ENCOUNTER FOR OTHER PREPROCEDURAL EXAMINATION: ICD-10-CM

## 2024-04-04 DIAGNOSIS — K02.9 DENTAL CARIES, UNSPECIFIED: ICD-10-CM

## 2024-04-16 NOTE — ED ADULT NURSE NOTE - COVID-19 ORDERING FACILITY
Blood glucose elevated on admission, per pt no diagnosis of T2DM, and sugar is always high during pain crises.  - follow up A1c to screen for DM  - monitor blood sugar on BMPs for now, consider adding POCT checks if blood sugar remains elevated
[facility]

## 2024-04-22 RX ORDER — METHENAMINE HIPPURATE 1 G/1
1 TABLET ORAL
Qty: 60 | Refills: 2 | Status: ACTIVE | COMMUNITY
Start: 2020-12-07 | End: 1900-01-01

## 2024-04-26 ENCOUNTER — APPOINTMENT (OUTPATIENT)
Dept: UROLOGY | Facility: CLINIC | Age: 69
End: 2024-04-26
Payer: MEDICARE

## 2024-04-26 DIAGNOSIS — N40.1 BENIGN PROSTATIC HYPERPLASIA WITH LOWER URINARY TRACT SYMPMS: ICD-10-CM

## 2024-04-26 DIAGNOSIS — Z46.6 ENCOUNTER FOR FITTING AND ADJUSTMENT OF URINARY DEVICE: ICD-10-CM

## 2024-04-26 DIAGNOSIS — N13.8 BENIGN PROSTATIC HYPERPLASIA WITH LOWER URINARY TRACT SYMPMS: ICD-10-CM

## 2024-04-26 PROCEDURE — 51710 CHANGE OF BLADDER TUBE: CPT

## 2024-04-30 ENCOUNTER — APPOINTMENT (OUTPATIENT)
Dept: UROLOGY | Facility: CLINIC | Age: 69
End: 2024-04-30

## 2024-05-13 ENCOUNTER — EMERGENCY (EMERGENCY)
Facility: HOSPITAL | Age: 69
LOS: 0 days | Discharge: HOME | End: 2024-05-13
Attending: EMERGENCY MEDICINE
Payer: MEDICARE

## 2024-05-13 VITALS
RESPIRATION RATE: 16 BRPM | OXYGEN SATURATION: 95 % | HEIGHT: 68.9 IN | HEART RATE: 70 BPM | TEMPERATURE: 98 F | SYSTOLIC BLOOD PRESSURE: 97 MMHG | DIASTOLIC BLOOD PRESSURE: 55 MMHG

## 2024-05-13 DIAGNOSIS — R56.9 UNSPECIFIED CONVULSIONS: ICD-10-CM

## 2024-05-13 DIAGNOSIS — Z98.890 OTHER SPECIFIED POSTPROCEDURAL STATES: Chronic | ICD-10-CM

## 2024-05-13 DIAGNOSIS — G80.8 OTHER CEREBRAL PALSY: ICD-10-CM

## 2024-05-13 DIAGNOSIS — K94.23 GASTROSTOMY MALFUNCTION: ICD-10-CM

## 2024-05-13 DIAGNOSIS — Z93.1 GASTROSTOMY STATUS: Chronic | ICD-10-CM

## 2024-05-13 DIAGNOSIS — Z93.59 OTHER CYSTOSTOMY STATUS: Chronic | ICD-10-CM

## 2024-05-13 PROCEDURE — 43762 RPLC GTUBE NO REVJ TRC: CPT

## 2024-05-13 PROCEDURE — L8699: CPT

## 2024-05-13 PROCEDURE — 74018 RADEX ABDOMEN 1 VIEW: CPT

## 2024-05-13 PROCEDURE — 99284 EMERGENCY DEPT VISIT MOD MDM: CPT | Mod: 25

## 2024-05-13 PROCEDURE — 99284 EMERGENCY DEPT VISIT MOD MDM: CPT

## 2024-05-13 PROCEDURE — 74018 RADEX ABDOMEN 1 VIEW: CPT | Mod: 26

## 2024-05-13 RX ORDER — IOHEXOL 300 MG/ML
30 INJECTION, SOLUTION INTRAVENOUS ONCE
Refills: 0 | Status: COMPLETED | OUTPATIENT
Start: 2024-05-13 | End: 2024-05-13

## 2024-05-13 RX ORDER — DIATRIZOATE MEGLUMINE 180 MG/ML
30 INJECTION, SOLUTION INTRAVESICAL ONCE
Refills: 0 | Status: DISCONTINUED | OUTPATIENT
Start: 2024-05-13 | End: 2024-05-13

## 2024-05-13 RX ADMIN — IOHEXOL 30 MILLILITER(S): 300 INJECTION, SOLUTION INTRAVENOUS at 12:17

## 2024-05-13 NOTE — ED ADULT NURSE NOTE - NS ED NURSE RECORD ANOTHER HT AND WT
Yes [FreeTextEntry1] : 56 year old female with newly diagnosed gastric adenocarcinoma. No evidence of metastatic disease on imaging, s/p mediport and EUS/diagnostic laparoscopy which confirmed local disease. \par \par - Plan for 4 cycles or perioperative FLOT (Fluorouracil, Leucovorin, Oxaliplatin, Docetaxel) q 2 weeks and 4 cycles q 2 weeks postoperatively. Reviewed side effects included but not limited fatigue, nausea/vomiting, low blood counts, peripheral neuropathy, kidney injury. Patient educational information provided, consent signed today. \par - of note she had issues picking up Dex and Zofran from her pharmacy, will send to [facility] today \par - check labs: CBC, BMP, Hep panel prior to chemo \par \par RTC tomorrow for chemo

## 2024-05-13 NOTE — ED PROVIDER NOTE - PROGRESS NOTE DETAILS
AE: G-tube replaced without complication, confirmed by x-ray.  Patient is well-appearing.  Will discharge with strict return precautions.

## 2024-05-13 NOTE — ED PROVIDER NOTE - OBJECTIVE STATEMENT
68-year-old male with past medical history of cerebral palsy, seizures, BPH, suprapubic catheter, PEG, wheelchair dependent, presents to the ED for a G-tube complication.  As per group home staff, the G-tube has been clogged since yesterday evening.  Patient does eat/drink by mouth, however is given his medications and free water through the G-tube.  Patient denies any recent fever, cough, vomiting, or diarrhea.

## 2024-05-13 NOTE — ED PROVIDER NOTE - PHYSICAL EXAMINATION
PHYSICAL EXAM: I have reviewed current vital signs.  GENERAL: NAD, well-nourished; well-developed.  HEAD:  Normocephalic, atraumatic.  EYES: Conjunctiva and sclera clear.  ENT: MMM, no erythema/exudates.  NECK: Supple, no JVD.  CHEST/LUNG: Clear to auscultation bilaterally; no wheezes, rales, or rhonchi.  HEART: Regular rate and rhythm, normal S1 and S2; no murmurs, rubs, or gallops.  ABDOMEN: G-tube in place, clogged.  No surrounding erythema, edema, pus, or tenderness. Soft, nontender, nondistended.  EXTREMITIES:  2+ peripheral pulses; no clubbing, cyanosis, or edema.  PSYCH: Cooperative, appropriate, normal mood and affect.  NEUROLOGY: A&O x 3. No focal neurological deficits.   SKIN: Warm and dry.

## 2024-05-13 NOTE — ED ADULT NURSE NOTE - NSFALLHARMRISKINTERV_ED_ALL_ED
Assistance OOB with selected safe patient handling equipment if applicable/Assistance with ambulation/Communicate risk of Fall with Harm to all staff, patient, and family/Encourage patient to sit up slowly, dangle for a short time, stand at bedside before walking/Monitor gait and stability/Monitor for mental status changes and reorient to person, place, and time, as needed/Provide patient with walking aids/Reinforce activity limits and safety measures with patient and family/Review medications for side effects contributing to fall risk/Toileting schedule using arm’s reach rule for commode and bathroom/Bed in lowest position, wheels locked, appropriate side rails in place/Call bell, personal items and telephone in reach/Instruct patient to call for assistance before getting out of bed/chair/stretcher/Non-slip footwear applied when patient is off stretcher/[place]ize to call system/Physically safe environment - no spills, clutter or unnecessary equipment/Purposeful Proactive Rounding/Room/bathroom lighting operational, light cord in reach

## 2024-05-13 NOTE — ED PROVIDER NOTE - PATIENT PORTAL LINK FT
You can access the FollowMyHealth Patient Portal offered by [facility] by registering at the following website: [email or web address]. By joining [facility]’s FollowMyHealth portal, you will also be able to view your health information using other applications (apps) compatible with our system.

## 2024-05-13 NOTE — ED PROVIDER NOTE - NSFOLLOWUPINSTRUCTIONS_ED_ALL_ED_FT
Gastrostomy Tube Replacement, Care After  This sheet gives you information about how to care for yourself after your procedure. Your health care provider may also give you more specific instructions. If you have problems or questions, contact your health care provider.    What can I expect after the procedure?  After the procedure, it is common to have:  Mild pain in your abdomen.  A small amount of blood-colored fluid leaking from the site of your gastrostomy tube (G-tube) replacement.  Follow these instructions at home:    Return to your normal activities as told by your health care provider.  You may return to your normal feedings.  Wash your hands for at least 20 seconds before and after caring for your G-tube.  Check the skin around your tube insertion site for:  Redness.  Irritation.  Swelling.  Drainage.  Extra tissue growth.  Care for your G-tube as you did before, or as told by your health care provider.  Keep all follow-up visits. This is important.  Contact a health care provider if:  You have a fever or chills.  You see any of these signs on the skin near your insertion site:  Redness.  Irritation.  Swelling.  Drainage.  Extra tissue growth.  You continue to have pain in the abdomen or leaking around your G-tube.  Get help right away if:  You develop bleeding or a lot of discharge around the tube.  You have severe pain in the abdomen.  Your new tube is not working properly.  You are unable to get feedings into the tube.  Your tube comes out for any reason.  Summary  After the procedure, it is common to have mild pain in your abdomen and a small amount of blood-colored fluid.  Return to your normal activities and feedings as told by your health care provider.  Care for your gastrostomy tube, or G-tube, as you did before, or as told by your health care provider.  This information is not intended to replace advice given to you by your health care provider. Make sure you discuss any questions you have with your health care provider.

## 2024-05-13 NOTE — ED PROVIDER NOTE - CLINICAL SUMMARY MEDICAL DECISION MAKING FREE TEXT BOX
68-year-old male history of cerebral palsy, spastic quadriplegia, baseline PEG tube, suprapubic catheter presenting for evaluation of clogged G-tube.  No other acute complaints per patient and her aide.  Well appearing, NAD, non toxic. NCAT PERRLA EOMI neck supple non tender normal wob abdomen s G-tube in place, no surrounding skin changes nt nd no rebound no guarding WWPx4 neuro non focal. g tube replaced. Aware of all results, given a copy of all available results, comfortable with discharge and follow-up outpatient, strict return precautions given. Endorses understanding of all of this and aware that they can return at any time for new or concerning symptoms. No further questions or concerns at this time

## 2024-05-13 NOTE — ED ADULT NURSE NOTE - OBJECTIVE STATEMENT
Patient presents to ED from facility in NAD a+ox3- as per aid at bedside staff at facility reported g tube is clogged

## 2024-05-15 ENCOUNTER — APPOINTMENT (OUTPATIENT)
Dept: UROLOGY | Facility: CLINIC | Age: 69
End: 2024-05-15
Payer: MEDICARE

## 2024-05-15 PROCEDURE — 51705 CHANGE OF BLADDER TUBE: CPT

## 2024-05-21 ENCOUNTER — APPOINTMENT (OUTPATIENT)
Dept: UROLOGY | Facility: CLINIC | Age: 69
End: 2024-05-21

## 2024-05-26 ENCOUNTER — INPATIENT (INPATIENT)
Facility: HOSPITAL | Age: 69
LOS: 1 days | Discharge: HOME | DRG: 195 | End: 2024-05-28
Attending: INTERNAL MEDICINE | Admitting: STUDENT IN AN ORGANIZED HEALTH CARE EDUCATION/TRAINING PROGRAM
Payer: MEDICARE

## 2024-05-26 VITALS
SYSTOLIC BLOOD PRESSURE: 176 MMHG | TEMPERATURE: 98 F | RESPIRATION RATE: 20 BRPM | DIASTOLIC BLOOD PRESSURE: 114 MMHG | HEART RATE: 110 BPM | HEIGHT: 68.9 IN | OXYGEN SATURATION: 90 %

## 2024-05-26 DIAGNOSIS — Z93.59 OTHER CYSTOSTOMY STATUS: Chronic | ICD-10-CM

## 2024-05-26 DIAGNOSIS — Z98.890 OTHER SPECIFIED POSTPROCEDURAL STATES: Chronic | ICD-10-CM

## 2024-05-26 DIAGNOSIS — Z93.1 GASTROSTOMY STATUS: Chronic | ICD-10-CM

## 2024-05-26 DIAGNOSIS — J18.9 PNEUMONIA, UNSPECIFIED ORGANISM: ICD-10-CM

## 2024-05-26 LAB
ALBUMIN SERPL ELPH-MCNC: 4.3 G/DL — SIGNIFICANT CHANGE UP (ref 3.5–5.2)
ALP SERPL-CCNC: 83 U/L — SIGNIFICANT CHANGE UP (ref 30–115)
ALT FLD-CCNC: 11 U/L — SIGNIFICANT CHANGE UP (ref 0–41)
ANION GAP SERPL CALC-SCNC: 9 MMOL/L — SIGNIFICANT CHANGE UP (ref 7–14)
APPEARANCE UR: CLEAR — SIGNIFICANT CHANGE UP
APTT BLD: 30.5 SEC — SIGNIFICANT CHANGE UP (ref 27–39.2)
AST SERPL-CCNC: 17 U/L — SIGNIFICANT CHANGE UP (ref 0–41)
BACTERIA # UR AUTO: ABNORMAL /HPF
BASOPHILS # BLD AUTO: 0.03 K/UL — SIGNIFICANT CHANGE UP (ref 0–0.2)
BASOPHILS NFR BLD AUTO: 0.3 % — SIGNIFICANT CHANGE UP (ref 0–1)
BILIRUB SERPL-MCNC: 0.2 MG/DL — SIGNIFICANT CHANGE UP (ref 0.2–1.2)
BILIRUB UR-MCNC: NEGATIVE — SIGNIFICANT CHANGE UP
BUN SERPL-MCNC: 12 MG/DL — SIGNIFICANT CHANGE UP (ref 10–20)
CALCIUM SERPL-MCNC: 9.7 MG/DL — SIGNIFICANT CHANGE UP (ref 8.4–10.5)
CAST: 3 /LPF — SIGNIFICANT CHANGE UP (ref 0–4)
CHLORIDE SERPL-SCNC: 104 MMOL/L — SIGNIFICANT CHANGE UP (ref 98–110)
CO2 SERPL-SCNC: 26 MMOL/L — SIGNIFICANT CHANGE UP (ref 17–32)
COLOR SPEC: YELLOW — SIGNIFICANT CHANGE UP
CREAT SERPL-MCNC: 0.5 MG/DL — LOW (ref 0.7–1.5)
DIFF PNL FLD: ABNORMAL
EGFR: 111 ML/MIN/1.73M2 — SIGNIFICANT CHANGE UP
EOSINOPHIL # BLD AUTO: 0.07 K/UL — SIGNIFICANT CHANGE UP (ref 0–0.7)
EOSINOPHIL NFR BLD AUTO: 0.6 % — SIGNIFICANT CHANGE UP (ref 0–8)
FLUAV AG NPH QL: SIGNIFICANT CHANGE UP
FLUBV AG NPH QL: SIGNIFICANT CHANGE UP
GLUCOSE SERPL-MCNC: 132 MG/DL — HIGH (ref 70–99)
GLUCOSE UR QL: NEGATIVE MG/DL — SIGNIFICANT CHANGE UP
HCT VFR BLD CALC: 39.8 % — LOW (ref 42–52)
HGB BLD-MCNC: 13.6 G/DL — LOW (ref 14–18)
IMM GRANULOCYTES NFR BLD AUTO: 0.4 % — HIGH (ref 0.1–0.3)
INR BLD: 1 RATIO — SIGNIFICANT CHANGE UP (ref 0.65–1.3)
KETONES UR-MCNC: NEGATIVE MG/DL — SIGNIFICANT CHANGE UP
LACTATE SERPL-SCNC: 0.9 MMOL/L — SIGNIFICANT CHANGE UP (ref 0.7–2)
LEUKOCYTE ESTERASE UR-ACNC: ABNORMAL
LYMPHOCYTES # BLD AUTO: 0.7 K/UL — LOW (ref 1.2–3.4)
LYMPHOCYTES # BLD AUTO: 5.9 % — LOW (ref 20.5–51.1)
MCHC RBC-ENTMCNC: 30.2 PG — SIGNIFICANT CHANGE UP (ref 27–31)
MCHC RBC-ENTMCNC: 34.2 G/DL — SIGNIFICANT CHANGE UP (ref 32–37)
MCV RBC AUTO: 88.4 FL — SIGNIFICANT CHANGE UP (ref 80–94)
MONOCYTES # BLD AUTO: 0.73 K/UL — HIGH (ref 0.1–0.6)
MONOCYTES NFR BLD AUTO: 6.2 % — SIGNIFICANT CHANGE UP (ref 1.7–9.3)
NEUTROPHILS # BLD AUTO: 10.28 K/UL — HIGH (ref 1.4–6.5)
NEUTROPHILS NFR BLD AUTO: 86.6 % — HIGH (ref 42.2–75.2)
NITRITE UR-MCNC: NEGATIVE — SIGNIFICANT CHANGE UP
NRBC # BLD: 0 /100 WBCS — SIGNIFICANT CHANGE UP (ref 0–0)
PH UR: 6.5 — SIGNIFICANT CHANGE UP (ref 5–8)
PLATELET # BLD AUTO: 241 K/UL — SIGNIFICANT CHANGE UP (ref 130–400)
PMV BLD: 10.8 FL — HIGH (ref 7.4–10.4)
POTASSIUM SERPL-MCNC: 3.2 MMOL/L — LOW (ref 3.5–5)
POTASSIUM SERPL-SCNC: 3.2 MMOL/L — LOW (ref 3.5–5)
PROT SERPL-MCNC: 7.1 G/DL — SIGNIFICANT CHANGE UP (ref 6–8)
PROT UR-MCNC: 100 MG/DL
PROTHROM AB SERPL-ACNC: 11.4 SEC — SIGNIFICANT CHANGE UP (ref 9.95–12.87)
RBC # BLD: 4.5 M/UL — LOW (ref 4.7–6.1)
RBC # FLD: 13.2 % — SIGNIFICANT CHANGE UP (ref 11.5–14.5)
RBC CASTS # UR COMP ASSIST: 10 /HPF — HIGH (ref 0–4)
RSV RNA NPH QL NAA+NON-PROBE: SIGNIFICANT CHANGE UP
SARS-COV-2 RNA SPEC QL NAA+PROBE: SIGNIFICANT CHANGE UP
SODIUM SERPL-SCNC: 139 MMOL/L — SIGNIFICANT CHANGE UP (ref 135–146)
SP GR SPEC: 1.02 — SIGNIFICANT CHANGE UP (ref 1–1.03)
SQUAMOUS # UR AUTO: 1 /HPF — SIGNIFICANT CHANGE UP (ref 0–5)
UROBILINOGEN FLD QL: 0.2 MG/DL — SIGNIFICANT CHANGE UP (ref 0.2–1)
WBC # BLD: 11.86 K/UL — HIGH (ref 4.8–10.8)
WBC # FLD AUTO: 11.86 K/UL — HIGH (ref 4.8–10.8)
WBC UR QL: 58 /HPF — HIGH (ref 0–5)

## 2024-05-26 PROCEDURE — 92610 EVALUATE SWALLOWING FUNCTION: CPT | Mod: GN

## 2024-05-26 PROCEDURE — 83735 ASSAY OF MAGNESIUM: CPT

## 2024-05-26 PROCEDURE — 86900 BLOOD TYPING SEROLOGIC ABO: CPT

## 2024-05-26 PROCEDURE — 84145 PROCALCITONIN (PCT): CPT

## 2024-05-26 PROCEDURE — 86901 BLOOD TYPING SEROLOGIC RH(D): CPT

## 2024-05-26 PROCEDURE — 99232 SBSQ HOSP IP/OBS MODERATE 35: CPT

## 2024-05-26 PROCEDURE — 99285 EMERGENCY DEPT VISIT HI MDM: CPT | Mod: GC

## 2024-05-26 PROCEDURE — 36415 COLL VENOUS BLD VENIPUNCTURE: CPT

## 2024-05-26 PROCEDURE — 74177 CT ABD & PELVIS W/CONTRAST: CPT | Mod: 26,MC

## 2024-05-26 PROCEDURE — 85027 COMPLETE CBC AUTOMATED: CPT

## 2024-05-26 PROCEDURE — 86850 RBC ANTIBODY SCREEN: CPT

## 2024-05-26 PROCEDURE — 80048 BASIC METABOLIC PNL TOTAL CA: CPT

## 2024-05-26 PROCEDURE — 80053 COMPREHEN METABOLIC PANEL: CPT

## 2024-05-26 PROCEDURE — 71045 X-RAY EXAM CHEST 1 VIEW: CPT | Mod: 26

## 2024-05-26 PROCEDURE — 85025 COMPLETE CBC W/AUTO DIFF WBC: CPT

## 2024-05-26 RX ORDER — SENNA PLUS 8.6 MG/1
2 TABLET ORAL DAILY
Refills: 0 | Status: DISCONTINUED | OUTPATIENT
Start: 2024-05-26 | End: 2024-05-28

## 2024-05-26 RX ORDER — ENOXAPARIN SODIUM 100 MG/ML
40 INJECTION SUBCUTANEOUS EVERY 24 HOURS
Refills: 0 | Status: DISCONTINUED | OUTPATIENT
Start: 2024-05-26 | End: 2024-05-28

## 2024-05-26 RX ORDER — ALBUTEROL 90 UG/1
3 AEROSOL, METERED ORAL
Qty: 0 | Refills: 0 | DISCHARGE

## 2024-05-26 RX ORDER — BACLOFEN 100 %
1 POWDER (GRAM) MISCELLANEOUS
Qty: 0 | Refills: 0 | DISCHARGE

## 2024-05-26 RX ORDER — POTASSIUM CITRATE AND CITRIC ACID MONOHYDRATE 1100; 334 MG/5ML; MG/5ML
20 SOLUTION ORAL
Qty: 0 | Refills: 0 | DISCHARGE

## 2024-05-26 RX ORDER — CEFEPIME 1 G/1
1000 INJECTION, POWDER, FOR SOLUTION INTRAMUSCULAR; INTRAVENOUS ONCE
Refills: 0 | Status: COMPLETED | OUTPATIENT
Start: 2024-05-26 | End: 2024-05-26

## 2024-05-26 RX ORDER — POTASSIUM CHLORIDE 20 MEQ
40 PACKET (EA) ORAL ONCE
Refills: 0 | Status: COMPLETED | OUTPATIENT
Start: 2024-05-26 | End: 2024-05-26

## 2024-05-26 RX ORDER — MAGNESIUM SULFATE 500 MG/ML
2 VIAL (ML) INJECTION ONCE
Refills: 0 | Status: COMPLETED | OUTPATIENT
Start: 2024-05-26 | End: 2024-05-26

## 2024-05-26 RX ORDER — DENOSUMAB 60 MG/ML
60 INJECTION SUBCUTANEOUS
Qty: 0 | Refills: 0 | DISCHARGE

## 2024-05-26 RX ORDER — PANTOPRAZOLE SODIUM 20 MG/1
40 TABLET, DELAYED RELEASE ORAL
Refills: 0 | Status: DISCONTINUED | OUTPATIENT
Start: 2024-05-26 | End: 2024-05-28

## 2024-05-26 RX ORDER — PIPERACILLIN AND TAZOBACTAM 4; .5 G/20ML; G/20ML
3.38 INJECTION, POWDER, LYOPHILIZED, FOR SOLUTION INTRAVENOUS ONCE
Refills: 0 | Status: COMPLETED | OUTPATIENT
Start: 2024-05-26 | End: 2024-05-26

## 2024-05-26 RX ORDER — IOHEXOL 300 MG/ML
30 INJECTION, SOLUTION INTRAVENOUS ONCE
Refills: 0 | Status: COMPLETED | OUTPATIENT
Start: 2024-05-26 | End: 2024-05-26

## 2024-05-26 RX ORDER — PHENOBARBITAL 60 MG
64.8 TABLET ORAL DAILY
Refills: 0 | Status: DISCONTINUED | OUTPATIENT
Start: 2024-05-26 | End: 2024-05-28

## 2024-05-26 RX ORDER — TAMSULOSIN HYDROCHLORIDE 0.4 MG/1
0.4 CAPSULE ORAL AT BEDTIME
Refills: 0 | Status: DISCONTINUED | OUTPATIENT
Start: 2024-05-26 | End: 2024-05-28

## 2024-05-26 RX ORDER — GUAIFENESIN/DEXTROMETHORPHAN 600MG-30MG
10 TABLET, EXTENDED RELEASE 12 HR ORAL THREE TIMES A DAY
Refills: 0 | Status: DISCONTINUED | OUTPATIENT
Start: 2024-05-26 | End: 2024-05-28

## 2024-05-26 RX ORDER — METHENAMINE MANDELATE 1 G
1 TABLET ORAL
Qty: 0 | Refills: 0 | DISCHARGE

## 2024-05-26 RX ORDER — ALBUTEROL 90 UG/1
2 AEROSOL, METERED ORAL EVERY 6 HOURS
Refills: 0 | Status: DISCONTINUED | OUTPATIENT
Start: 2024-05-26 | End: 2024-05-28

## 2024-05-26 RX ORDER — SODIUM CHLORIDE 9 MG/ML
1500 INJECTION, SOLUTION INTRAVENOUS ONCE
Refills: 0 | Status: COMPLETED | OUTPATIENT
Start: 2024-05-26 | End: 2024-05-26

## 2024-05-26 RX ORDER — PHENOBARBITAL 60 MG
64.8 TABLET ORAL DAILY
Refills: 0 | Status: DISCONTINUED | OUTPATIENT
Start: 2024-05-26 | End: 2024-05-26

## 2024-05-26 RX ORDER — VANCOMYCIN HCL 1 G
1000 VIAL (EA) INTRAVENOUS ONCE
Refills: 0 | Status: COMPLETED | OUTPATIENT
Start: 2024-05-26 | End: 2024-05-26

## 2024-05-26 RX ORDER — PIPERACILLIN AND TAZOBACTAM 4; .5 G/20ML; G/20ML
3.38 INJECTION, POWDER, LYOPHILIZED, FOR SOLUTION INTRAVENOUS EVERY 8 HOURS
Refills: 0 | Status: DISCONTINUED | OUTPATIENT
Start: 2024-05-26 | End: 2024-05-27

## 2024-05-26 RX ADMIN — PIPERACILLIN AND TAZOBACTAM 200 GRAM(S): 4; .5 INJECTION, POWDER, LYOPHILIZED, FOR SOLUTION INTRAVENOUS at 12:39

## 2024-05-26 RX ADMIN — ENOXAPARIN SODIUM 40 MILLIGRAM(S): 100 INJECTION SUBCUTANEOUS at 12:26

## 2024-05-26 RX ADMIN — SODIUM CHLORIDE 1500 MILLILITER(S): 9 INJECTION, SOLUTION INTRAVENOUS at 06:00

## 2024-05-26 RX ADMIN — SODIUM CHLORIDE 1500 MILLILITER(S): 9 INJECTION, SOLUTION INTRAVENOUS at 07:15

## 2024-05-26 RX ADMIN — Medication 10 MILLILITER(S): at 14:39

## 2024-05-26 RX ADMIN — Medication 10 MILLILITER(S): at 21:59

## 2024-05-26 RX ADMIN — PIPERACILLIN AND TAZOBACTAM 25 GRAM(S): 4; .5 INJECTION, POWDER, LYOPHILIZED, FOR SOLUTION INTRAVENOUS at 14:39

## 2024-05-26 RX ADMIN — Medication 10 MILLIGRAM(S): at 12:39

## 2024-05-26 RX ADMIN — Medication 64.8 MILLIGRAM(S): at 12:25

## 2024-05-26 RX ADMIN — Medication 25 GRAM(S): at 06:42

## 2024-05-26 RX ADMIN — CEFEPIME 100 MILLIGRAM(S): 1 INJECTION, POWDER, FOR SOLUTION INTRAMUSCULAR; INTRAVENOUS at 06:19

## 2024-05-26 RX ADMIN — TAMSULOSIN HYDROCHLORIDE 0.4 MILLIGRAM(S): 0.4 CAPSULE ORAL at 21:59

## 2024-05-26 RX ADMIN — Medication 40 MILLIEQUIVALENT(S): at 06:42

## 2024-05-26 RX ADMIN — Medication 250 MILLIGRAM(S): at 04:54

## 2024-05-26 RX ADMIN — Medication 1 TABLET(S): at 12:26

## 2024-05-26 RX ADMIN — IOHEXOL 30 MILLILITER(S): 300 INJECTION, SOLUTION INTRAVENOUS at 05:03

## 2024-05-26 RX ADMIN — SENNA PLUS 2 TABLET(S): 8.6 TABLET ORAL at 12:25

## 2024-05-26 NOTE — ED PROVIDER NOTE - OBJECTIVE STATEMENT
Pt is a 68-year-old male with past medical history of cerebral palsy, seizures, BPH, suprapubic catheter, PEG, wheelchair dependent, presents to the ED for evaluation of cough congestion shortness of breath and abd pain/distension. Otherwise denies any fever, chills, headache, changes in vision, cp, palpitations, sob, n/v/d, constipation, urinary complaints, lower extremity pain/swelling.

## 2024-05-26 NOTE — H&P ADULT - HIV OFFER
Opt out
2/2 pain, passive ROM WNL/LIMITED ROM
no back pain, no gout, no musculoskeletal pain, no neck pain, and no weakness.

## 2024-05-26 NOTE — ED PROVIDER NOTE - CLINICAL SUMMARY MEDICAL DECISION MAKING FREE TEXT BOX
67yo M history of cerebral palsy, spastic quadriplegia, sz d/o, G tube/SPT in place presenting for eval of cough congestion shortness of breath. No fevers/chills, vomiting, diarrhea. chronically ill appearing, non toxic. NCAT PERRLA EOMI +secretions in oropharynx, cough neck supple non tender normal wob coarse breath sounds b/l rrr abdomen mildly distended no rebound no guarding g tube in place, WWPx4 neuro non focal  spt in place, no surrounding skin changes. labs ekg imaging reviewed. empiric abx given. +pna, uti. will admit for further eval. saturating well on NC.

## 2024-05-26 NOTE — H&P ADULT - ATTENDING COMMENTS
Patient seen and examined independently.    GENERAL: NAD, lying comfortably in bed  LUNG: CTAB  HEART: Regular rate and rhythm, No murmurs  ABDOMEN: Soft, nontender, nondistended  EXTREMITIES:  No edema  NERVOUS SYSTEM:  Alert  SKIN: No rashes or lesions      68-year-old male with past medical history of cerebral palsy, seizures, BPH, suprapubic catheter, PEG, wheelchair dependent who presents due to cough, congestion, sputum production and erythema by PEG tube.      #Likely Aspiration pneumonia  #Cystitis  #Cellulitis surrounding PEG  -WBC 11.86, UA positive for mod leukocyte esterase, erythema and tenderness by PEG site, patient with cough and rhonchi on exam, currently on 5L NC   -chest x ray with increased right sided opacities compared to prior, follow up on official read  -EKG sinus tachycardia  -CT a/p: Nondistended bladder with suprapubic catheter, thickening of the bladder wall which is similar to prior CT dated 11/21/2022 and at least in part due to nondistention. Correlate with urinalysis if there is a concern for cystitis. 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  -In ED patient was given cefepime and vanc   -follow up on official chest x-ray read  -start zosyn  -obtain MRSA nares  -obtain procal  -ID eval  -NPO until speech and swallow, meds through peg tube     #Constipation  -CT demonstrated: Dilated redundant sigmoid colon measuring up to 9 cm containing large volume stool and gas. The remaining colon is diffusely dilated as well with gas and stool. No convincing evidence of sigmoid volvulus. Small bowel is nondistended.  -c/w home miralax and lactulose  -start senna and dulcolax suppository  -if no BM patient will need enema    #Seizures  -c/w home phenobarbital     #BPH  -c/w home tamsulosin     DVT proph: lovenox subq  GI proph: PPI

## 2024-05-26 NOTE — ED ADULT TRIAGE NOTE - CHIEF COMPLAINT QUOTE
pt sent from group home for g tube complications. when triaging pt noted to sound congested and stated he didn't feel good.

## 2024-05-26 NOTE — ED ADULT NURSE NOTE - OBJECTIVE STATEMENT
Pt came into the ED from group home c/o gtube complications. Pt reports not feeling well and being congested.

## 2024-05-26 NOTE — SWALLOW BEDSIDE ASSESSMENT ADULT - COMMENTS
pt received alert. +NC. +wet upper airway congestion audible baseline. pt known to acute service w/recs for puree w/moderately thick liquids. per health aide, present bedside, pt w/toleration of the current diet and receives meds via PEG.

## 2024-05-26 NOTE — H&P ADULT - NSHPPHYSICALEXAM_GEN_ALL_CORE
PHYSICAL EXAM:  GENERAL: NAD, patient with cough, on 5L NC, can answer basic questions   HEAD:  Atraumatic, Normocephalic  EYES: EOMI, PERRLA, conjunctiva and sclera clear  ENMT: No tonsillar erythema, exudates, or enlargement; Moist mucous membranes  NECK: Supple, No JVD, Normal thyroid  HEART: Regular rate and rhythm; No murmurs, rubs, or gallops  RESPIRATORY: rhonchi on exam, cough present with sputum   ABDOMEN: peg tube with erythema and tenderness   NEUROLOGY: nonfocal, moving all extremities  EXTREMITIES:  2+ Peripheral Pulses, No clubbing, cyanosis, or edema

## 2024-05-26 NOTE — H&P ADULT - ASSESSMENT
68-year-old male with past medical history of cerebral palsy, seizures, BPH, suprapubic catheter, PEG, wheelchair dependent who presents due to cough, congestion, sputum production and erythema by PEG tube.      #Likely Aspiration pneumonia  #Cystitis  #Cellulitis surrounding PEG  -WBC 11.86, UA positive for mod leukocyte esterase, erythema and tenderness by PEG site, patient with cough and rhonchi on exam, currently on 5L NC   -chest x ray with increased right sided opacities compared to prior, follow up on official read  -EKG sinus tachycardia  -CT a/p: Nondistended bladder with suprapubic catheter, thickening of the bladder wall which is similar to prior CT dated 11/21/2022 and at least in part due to nondistention. Correlate with urinalysis if there is a concern for cystitis. 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  -In ED patient was given cefepime and vanc   -follow up on official chest x-ray read  -start zosyn  -obtain MRSA nares  -obtain procal  -ID eval  -NPO until speech and swallow, meds through peg tube     #Constipation  -CT demonstrated: Dilated redundant sigmoid colon measuring up to 9 cm containing large volume stool and gas. The remaining colon is diffusely dilated as well with gas and stool. No convincing evidence of sigmoid volvulus. Small bowel is nondistended.  -c/w home miralax and lactulose  -start senna and dulcolax suppository  -if no BM patient will need enema    #Seizures  -c/w home phenobarbital     #BPH  -c/w home tamsulosin     DVT proph: lovenox subq  GI proph: PPI

## 2024-05-26 NOTE — PATIENT PROFILE ADULT - NSTRANSFERBELONGINGSRESP_GEN_A_NUR
Emergency Department Encounter    Patient: Genesis Franz  MRN: [id number]  : 1945  Date of Evaluation: 2020  ED Provider:  Brennen Velasquez    Triage Chief Complaint:   Concern For COVID-19 (C/o chills that began on  after \"being outside in nothing but a sweater in the wind. \" Patient states he has had 2 exposures to COVID positive patients. ); Fever (C/o temp of 103 today, patient has NOT TAKEN ANY MEDICATION TODAY); and Nasal Congestion (C/o nasal congestion, took Aleve lastnight.)    [place]:  Genesis Franz is a 76 y.o. male with hypertension and hyperlipidemia that presents with cough, congestion, fever, general malaise and myalgias. Patient reports symptoms started 4 days ago. 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 Decreased appetite over the last 24 hours. Patient reports he has had minimal p.o. intake today. Patient also reports increase in urgency and frequency of urination. He denies any pain/burning with urination. ROS - see HPI, below listed is current ROS at time of my eval:  General:  + fevers, + chills, no weakness  Eyes:  No recent vison changes, no discharge  ENT:  No sore throat, + nasal congestion, no hearing changes  Cardiovascular:  No chest pain, no palpitations  Respiratory:  No shortness of breath, + cough, no wheezing  Gastrointestinal:  No pain, no nausea, no vomiting, no diarrhea  Musculoskeletal:  No muscle pain  Skin:  No rash,   Neurologic:  No speech problems, no headache  Genitourinary:  No dysuria  Extremities:  no edema, no pain    Past Medical History:   Diagnosis Date    GERD (gastroesophageal reflux disease)     Hyperlipidemia     Hypertension      Past Surgical History:   Procedure Laterality Date    APPENDECTOMY       History reviewed. No pertinent family history.   Social History     Socioeconomic History    Marital status:      Spouse name: Not on file    Number of children: Not on file    Years of education: Not on file    Highest education level: Not on file   Occupational History    Not on file   Social Needs    Financial resource strain: Not on file    Food insecurity     Worry: Not on file     Inability: Not on file    Transportation needs     Medical: Not on file     Non-medical: Not on file   Tobacco Use    Smoking status: Never Smoker    Smokeless tobacco: Never Used   Substance and Sexual Activity    Alcohol use: Never     Frequency: Never    Drug use: Never    Sexual activity: Not on file   Lifestyle    Physical activity     Days per week: Not on file     Minutes per session: Not on file    Stress: Not on file   Relationships    Social connections     Talks on phone: Not on file     Gets together: Not on file     Attends [employer] service: Not on file     Active member of club or organization: Not on file     Attends meetings of clubs or organizations: Not on file     Relationship status: Not on file    Intimate partner violence     Fear of current or ex partner: Not on file     Emotionally abused: Not on file     Physically abused: Not on file     Forced sexual activity: Not on file   Other Topics Concern    Not on file   Social History Narrative    Not on file     Current Facility-Administered Medications   Medication Dose Route Frequency Provider Last Rate Last Dose    azithromycin (ZITHROMAX) 500 mg in dextrose 5 % 250 mL IVPB  500 mg Intravenous Once Isabelle Lawrence  mL/hr at 11/18/20 2223 500 mg at 11/18/20 2223    dexamethasone (DECADRON) tablet 8 mg  8 mg Oral Once Isabelle Lawrence MD         Current Outpatient Medications   Medication Sig Dispense Refill    famotidine (PEPCID) 40 MG tablet Take 40 mg by mouth daily      omeprazole 20 MG EC tablet Take 20 mg by mouth daily      losartan-hydrochlorothiazide (HYZAAR) 100-25 MG per tablet Take 1 tablet by mouth daily      atorvastatin (LIPITOR) 40 MG tablet Take 40 mg by mouth daily       No Known Allergies    Nursing Notes Reviewed    Physical Exam:  Triage VS:    ED Triage Vitals [11/18/20 1938]   Enc Vitals Group      BP (!) 151/87      Pulse 96      Resp 16      Temp 102.5 °F (39.2 °C)      Temp Source Oral      SpO2 97 %      Weight 215 lb (97.5 kg)      Height 5' 11\" (1.803 m)      Head Circumference       Peak Flow       Pain Score       Pain Loc       Pain Edu? Excl. in [address]? My pulse ox interpretation is - normal    General appearance:  No acute distress. Skin:  Warm. Dry. Eye:  Extraocular movements intact. Ears, nose, mouth and throat:  Oral mucosa moist, tympanic membranes without evidence of otitis media, posterior pharynx with erythema but no exudate, nasal congestion noted   Neck:  Trachea midline. No palpable tender lymphadenopathy  Extremity:   Normal ROM     Heart:  Regular rate and rhythm   Perfusion:  intact  Respiratory:  Lungs clear to auscultation bilaterally; coarse breath sounds throughout but greater on right. Respirations nonlabored. Abdominal:  Normal bowel sounds. Soft. Nontender. Non distended. Neurological:  Alert and oriented times 3.       I have reviewed and interpreted all of the currently available lab results from this visit (if applicable):  Results for orders placed or performed during the hospital encounter of 11/18/20   Rapid Flu Swab    Specimen: Nasopharyngeal   Result Value Ref Range    Rapid Influenza A Ag NEGATIVE NEGATIVE    Rapid Influenza B Ag NEGATIVE NEGATIVE   CBC Auto Differential   Result Value Ref Range    WBC 9.5 4.0 - 10.5 K/CU MM    RBC 5.03 4.6 - 6.2 M/CU MM    Hemoglobin 15.8 13.5 - 18.0 GM/DL    Hematocrit 46.3 42 - 52 %    MCV 92.0 78 - 100 FL    MCH 31.4 (H) 27 - 31 PG    MCHC 34.1 32.0 - 36.0 %    RDW 11.9 11.7 - 14.9 %    Platelets 569 601 - 638 K/CU MM    MPV 8.7 7.5 - 11.1 FL    Differential Type AUTOMATED DIFFERENTIAL     Segs Relative 83.9 (H) 36 - 66 %    Lymphocytes % 5.1 (L) 24 - 44 %    Monocytes % 10.3 (H) 0 - 4 %    Eosinophils % 0.0 0 - 3 %    Basophils % 0.3 0 - 1 %    Segs Absolute 8.0 K/CU MM    Lymphocytes Absolute 0.5 K/CU MM    Monocytes Absolute 1.0 K/CU MM    Eosinophils Absolute 0.0 K/CU MM    Basophils Absolute 0.0 K/CU MM    Immature Neutrophil % 0.4 0 - 0.43 %    Total Immature Neutrophil 0.04 K/CU MM   Basic Metabolic Panel w/ Reflex to MG   Result Value Ref Range    Sodium 131 (L) 135 - 145 MMOL/L    Potassium 3.4 (L) 3.5 - 5.1 MMOL/L    Chloride 92 (L) 99 - 110 mMol/L    CO2 31 21 - 32 MMOL/L    Anion Gap 8 4 - 16    BUN 18 6 - 23 MG/DL    CREATININE 1.1 0.9 - 1.3 MG/DL    Glucose 109 (H) 70 - 99 MG/DL    Calcium 8.2 (L) 8.3 - 10.6 MG/DL    GFR Non-African American >60 >60 mL/min/1.73m2    GFR African American >60 >60 mL/min/1.73m2   Urinalysis Reflex to Culture    Specimen: Urine   Result Value Ref Range    Color, UA YELLOW YELLOW    Clarity, UA CLEAR CLEAR    Glucose, Urine NEGATIVE NEGATIVE MG/DL    Bilirubin Urine NEGATIVE NEGATIVE MG/DL    Ketones, Urine MODERATE (A) NEGATIVE MG/DL    Specific Gravity, UA 1.020 1.001 - 1.035    Blood, Urine SMALL (A) NEGATIVE    pH, Urine 6.0 5.0 - 8.0    Protein, UA TRACE (A) NEGATIVE MG/DL    Urobilinogen, Urine 0.2 0.2 - 1.0 MG/DL    Nitrite Urine, Quantitative NEGATIVE NEGATIVE    Leukocyte Esterase, Urine NEGATIVE NEGATIVE    RBC, UA 3 TO 5 0 - 3 /HPF    WBC, UA 3 TO 5 0 - 2 /HPF    Epithelial Cells, UA NO CELLS SEEN /HPF    Cast Type NO CAST FORMS SEEN NO CAST FORMS SEEN /HPF    Bacteria, UA MANY (A) NEGATIVE /HPF    Crystal Type NONE SEEN NEGATIVE /HPF    Mucus, UA 2+ (A) NEGATIVE HPF   Lactate, Plasma   Result Value Ref Range    Lactate 1.9 0.4 - 2.0 mMOL/L   Magnesium   Result Value Ref Range    Magnesium 1.9 1.8 - 2.4 mg/dl      Radiographs (if obtained):  Radiologist's Report Reviewed:  No results found. MDM:  Patient presenting with URI symptoms and increase in urgency and frequency of urination.  Temp was 102.5 on arrival; Benign exam.  At this point symptoms appear most consistent with a viral URI, versus another process early in its course. I estimate there is low risk for, but not limited to, Acute coronary syndrome, pulmonary embolus, aortic dissection, pneumonia requiring admission, Influenza requiring admission, sepsis, bacterial meningitis, aortic dissection. I will get CBC, UA, BMP, Lactic, influenza, COVID-19, Chest x-ray. I will give     acetaminophen (TYLENOL) tablet 1,000 mg (1,000 mg Oral Given 11/18/20 2030)   ketorolac (TORADOL) injection 15 mg (15 mg Intravenous Given 11/18/20 2030)       On follow up Patient is nontoxic appearing; feels much improved. He appears well hydrated and is tolerating oral intake without difficulty. Temp decreased. No resp. Distress; No tachypnea. Workup shows: Mild Hypokalemia and lung infiltrate vs. Mass. CT chest showed Large right upper lobe focus of ground-glass attenuation.  Imaging  features can be seen with COVID-19 pneumonia, though are nonspecific and can occur with a variety of infectious and noninfectious processes. Rapid Covid was positive. Imaging consistent with both Covid and bacterial/atypical pneumonia; will cover for CAP. Patient appears well and most likely can go home; he does prefer to go home; I did discuss risks and benefits of admission and outpatient treatment. Curb 65 score is 1  Pt was also given    cefTRIAXone (ROCEPHIN) 2 g IVPB in D5W 50ml minibag (2 g Intravenous New Bag 11/18/20 2151)     And   azithromycin (ZITHROMAX) 500 mg in dextrose 5 % 250 mL IVPB (500 mg Intravenous New Bag 11/18/20 2223)   dexamethasone (DECADRON) tablet 8 mg (has no administration in time range)     potassium chloride (KLOR-CON M) extended release tablet 40 mEq (40 mEq Oral Given 11/18/20 2113)   magnesium sulfate 2 g in 50 mL IVPB premix (0 g Intravenous Stopped 11/18/20 2155)     Patient was instructed on self isolation, monitoring and outpatient followup.   He understand and agree with the plan of care, return warnings given. We have discussed the symptoms which are most concerning that necessitate immediate return. Clinical Impression:  1. Acute upper respiratory infection    2. Suspected COVID-19 virus infection      Disposition referral (if applicable): New Asher MD  [address] [address] [place] [address]  [phone]    Call   To discuss  further recommendations    Disposition medications (if applicable):  New Prescriptions    No medications on file     ED Provider Disposition Time  DISPOSITION    You have signs and symptoms of Covid-19. You also had a positive rapid Covid-19 test done today. Please self isolate. A CT scan of your chest has signs of Covid-19 and some features of a bacterial pneumonia; you will be covered with antibiotics for a community-acquired pneumonia as well. Please take antibiotics as directed. It is very important that you self isolate. Please stay well-hydrated    I recommend Acetaminophen 1000 mg every 8 hours as needed for general malaise and fever. You can also take Ibuprofen 600 mg every 8 hours as needed for general malaise and fever. You may take Ibuprofen and Acetaminophen together;  however, do not take Ibuprofen with any other nonsteroidal anti-inflammatory. Do not take more than 4000 mg of acetaminophen in a 24-hour period. Please call your primary care physician first thing tomorrow morning to discuss positive Covid 19 test, symptoms and further recommendations. Return to the emergency department if you of any difficulty breathing, confusion, chest pain, severe nausea and vomiting/inability to keep any food or liquids down or any medical concerns. Comment: Please note this report has been produced using speech recognition software and may contain errors related to that system including errors in grammar, punctuation, and spelling, as well as words and phrases that may be inappropriate. Efforts were made to edit the dictations.         Tawanda Villar MD  11/19/20 0096 yes

## 2024-05-26 NOTE — ED PROVIDER NOTE - PROGRESS NOTE DETAILS
pk: sepsis protocol initiated, will obtain ct abd pelvis with oral ivc. pk: labs and imaging reviewed, pt hypokalemic, repleted with K and Mg, ct and xray reviewed, likely viral Pna, large sotol burden noted G tube is in correct place. signed out to MAR

## 2024-05-26 NOTE — ED PROVIDER NOTE - CARE PLAN
1 Principal Discharge DX:	Pneumonia  Secondary Diagnosis:	Constipation  Secondary Diagnosis:	Chronic UTI

## 2024-05-26 NOTE — PATIENT PROFILE ADULT - FALL HARM RISK - HARM RISK INTERVENTIONS
Assistance with ambulation/Assistance OOB with selected safe patient handling equipment/Communicate Risk of Fall with Harm to all staff/Discuss with provider need for PT consult/Monitor gait and stability/Reinforce activity limits and safety measures with patient and family/Tailored Fall Risk Interventions/Visual Cue: Yellow wristband and red socks/Bed in lowest position, wheels locked, appropriate side rails in place/Call bell, personal items and telephone in reach/Instruct patient to call for assistance before getting out of bed or chair/Non-slip footwear when patient is out of bed/[place] to call system/Physically safe environment - no spills, clutter or unnecessary equipment/Purposeful Proactive Rounding/Room/bathroom lighting operational, light cord in reach

## 2024-05-26 NOTE — ED ADULT NURSE NOTE - NSFALLRISKINTERV_ED_ALL_ED

## 2024-05-26 NOTE — PATIENT PROFILE ADULT - NSPRESCRUSEDDRG_GEN_A_NUR
I called the pharmacy and verified that they have refills available for the pt on the atorvastatin and lotrel.
No

## 2024-05-26 NOTE — H&P ADULT - NSHPLABSRESULTS_GEN_ALL_CORE
13.6   11.86 )-----------( 241      ( 26 May 2024 04:20 )             39.8           139  |  104  |  12  ----------------------------<  132<H>  3.2<L>   |  26  |  0.5<L>    Ca    9.7      26 May 2024 04:20    TPro  7.1  /  Alb  4.3  /  TBili  0.2  /  DBili  x   /  AST  17  /  ALT  11  /  AlkPhos  83                Urinalysis Basic - ( 26 May 2024 04:20 )    Color: Yellow / Appearance: Clear / S.017 / pH: x  Gluc: 132 mg/dL / Ketone: Negative mg/dL  / Bili: Negative / Urobili: 0.2 mg/dL   Blood: x / Protein: 100 mg/dL / Nitrite: Negative   Leuk Esterase: Moderate / RBC: 10 /HPF / WBC 58 /HPF   Sq Epi: x / Non Sq Epi: 1 /HPF / Bacteria: Many /HPF        PT/INR - ( 26 May 2024 04:20 )   PT: 11.40 sec;   INR: 1.00 ratio         PTT - ( 26 May 2024 04:20 )  PTT:30.5 sec    Lactate Trend   @ 04:20 Lactate:0.9             CAPILLARY BLOOD GLUCOSE

## 2024-05-26 NOTE — H&P ADULT - HISTORY OF PRESENT ILLNESS
68-year-old male with past medical history of cerebral palsy, seizures, BPH, suprapubic catheter, PEG, wheelchair dependent who presents due to cough, congestion, sputum production and erythema by PEG tube.  Patient states that he has been coughing more lately with sputum production.  Per aide bedside, patient has been congested as well.  Home aide states that medications are given through peg tube and patient eats a pureed diet orally.  Patient has also been having some abdominal pain diffusely.  He denies nausea, vomiting, chest pain, headache, fever or chills.     In the ED vitals: /114, , T 97.8, O2 sat 90% on room air  Labs: WBC 11.86, RVP negative, UA positive for mod leukocyte esterase  Chest xray: increased right sided opacities on my read  EKG sinus tachycardia  CT a/p: Dilated redundant sigmoid colon measuring up to 9 cm containing large volume stool and gas. The remaining colon is diffusely dilated as well with gas and stool. No convincing evidence of sigmoid volvulus. Small bowel is nondistended. Nondistended bladder with suprapubic catheter, thickening of the bladder wall which is similar to prior CT dated 11/21/2022 and at least in part due to nondistention. Correlate with urinalysis if there is a concern for cystitis. 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    In the ED patient was given: vanc , cefepime, 1.5L fluid bolus, magnesium and potassium

## 2024-05-27 LAB
ALBUMIN SERPL ELPH-MCNC: 3.6 G/DL — SIGNIFICANT CHANGE UP (ref 3.5–5.2)
ALP SERPL-CCNC: 66 U/L — SIGNIFICANT CHANGE UP (ref 30–115)
ALT FLD-CCNC: 9 U/L — SIGNIFICANT CHANGE UP (ref 0–41)
ANION GAP SERPL CALC-SCNC: 10 MMOL/L — SIGNIFICANT CHANGE UP (ref 7–14)
AST SERPL-CCNC: 18 U/L — SIGNIFICANT CHANGE UP (ref 0–41)
BASOPHILS # BLD AUTO: 0.03 K/UL — SIGNIFICANT CHANGE UP (ref 0–0.2)
BASOPHILS NFR BLD AUTO: 0.6 % — SIGNIFICANT CHANGE UP (ref 0–1)
BILIRUB SERPL-MCNC: 0.3 MG/DL — SIGNIFICANT CHANGE UP (ref 0.2–1.2)
BUN SERPL-MCNC: 10 MG/DL — SIGNIFICANT CHANGE UP (ref 10–20)
CALCIUM SERPL-MCNC: 8.1 MG/DL — LOW (ref 8.4–10.5)
CHLORIDE SERPL-SCNC: 105 MMOL/L — SIGNIFICANT CHANGE UP (ref 98–110)
CO2 SERPL-SCNC: 27 MMOL/L — SIGNIFICANT CHANGE UP (ref 17–32)
CREAT SERPL-MCNC: 0.5 MG/DL — LOW (ref 0.7–1.5)
EGFR: 111 ML/MIN/1.73M2 — SIGNIFICANT CHANGE UP
EOSINOPHIL # BLD AUTO: 0.18 K/UL — SIGNIFICANT CHANGE UP (ref 0–0.7)
EOSINOPHIL NFR BLD AUTO: 3.7 % — SIGNIFICANT CHANGE UP (ref 0–8)
GLUCOSE SERPL-MCNC: 96 MG/DL — SIGNIFICANT CHANGE UP (ref 70–99)
HCT VFR BLD CALC: 32.3 % — LOW (ref 42–52)
HCT VFR BLD CALC: 33.2 % — LOW (ref 42–52)
HGB BLD-MCNC: 11.2 G/DL — LOW (ref 14–18)
HGB BLD-MCNC: 11.4 G/DL — LOW (ref 14–18)
IMM GRANULOCYTES NFR BLD AUTO: 0.2 % — SIGNIFICANT CHANGE UP (ref 0.1–0.3)
LYMPHOCYTES # BLD AUTO: 1.35 K/UL — SIGNIFICANT CHANGE UP (ref 1.2–3.4)
LYMPHOCYTES # BLD AUTO: 27.8 % — SIGNIFICANT CHANGE UP (ref 20.5–51.1)
MAGNESIUM SERPL-MCNC: 2.2 MG/DL — SIGNIFICANT CHANGE UP (ref 1.8–2.4)
MCHC RBC-ENTMCNC: 30.6 PG — SIGNIFICANT CHANGE UP (ref 27–31)
MCHC RBC-ENTMCNC: 30.8 PG — SIGNIFICANT CHANGE UP (ref 27–31)
MCHC RBC-ENTMCNC: 34.3 G/DL — SIGNIFICANT CHANGE UP (ref 32–37)
MCHC RBC-ENTMCNC: 34.7 G/DL — SIGNIFICANT CHANGE UP (ref 32–37)
MCV RBC AUTO: 88.7 FL — SIGNIFICANT CHANGE UP (ref 80–94)
MCV RBC AUTO: 89 FL — SIGNIFICANT CHANGE UP (ref 80–94)
MONOCYTES # BLD AUTO: 0.65 K/UL — HIGH (ref 0.1–0.6)
MONOCYTES NFR BLD AUTO: 13.4 % — HIGH (ref 1.7–9.3)
NEUTROPHILS # BLD AUTO: 2.63 K/UL — SIGNIFICANT CHANGE UP (ref 1.4–6.5)
NEUTROPHILS NFR BLD AUTO: 54.3 % — SIGNIFICANT CHANGE UP (ref 42.2–75.2)
NRBC # BLD: 0 /100 WBCS — SIGNIFICANT CHANGE UP (ref 0–0)
NRBC # BLD: 0 /100 WBCS — SIGNIFICANT CHANGE UP (ref 0–0)
PLATELET # BLD AUTO: 198 K/UL — SIGNIFICANT CHANGE UP (ref 130–400)
PLATELET # BLD AUTO: 202 K/UL — SIGNIFICANT CHANGE UP (ref 130–400)
PMV BLD: 11 FL — HIGH (ref 7.4–10.4)
PMV BLD: 11 FL — HIGH (ref 7.4–10.4)
POTASSIUM SERPL-MCNC: 3.7 MMOL/L — SIGNIFICANT CHANGE UP (ref 3.5–5)
POTASSIUM SERPL-SCNC: 3.7 MMOL/L — SIGNIFICANT CHANGE UP (ref 3.5–5)
PROCALCITONIN SERPL-MCNC: 0.03 NG/ML — SIGNIFICANT CHANGE UP (ref 0.02–0.1)
PROT SERPL-MCNC: 5.7 G/DL — LOW (ref 6–8)
RBC # BLD: 3.64 M/UL — LOW (ref 4.7–6.1)
RBC # BLD: 3.73 M/UL — LOW (ref 4.7–6.1)
RBC # FLD: 13.4 % — SIGNIFICANT CHANGE UP (ref 11.5–14.5)
RBC # FLD: 13.6 % — SIGNIFICANT CHANGE UP (ref 11.5–14.5)
SODIUM SERPL-SCNC: 142 MMOL/L — SIGNIFICANT CHANGE UP (ref 135–146)
WBC # BLD: 4.85 K/UL — SIGNIFICANT CHANGE UP (ref 4.8–10.8)
WBC # BLD: 5.88 K/UL — SIGNIFICANT CHANGE UP (ref 4.8–10.8)
WBC # FLD AUTO: 4.85 K/UL — SIGNIFICANT CHANGE UP (ref 4.8–10.8)
WBC # FLD AUTO: 5.88 K/UL — SIGNIFICANT CHANGE UP (ref 4.8–10.8)

## 2024-05-27 PROCEDURE — 99232 SBSQ HOSP IP/OBS MODERATE 35: CPT

## 2024-05-27 RX ADMIN — Medication 64.8 MILLIGRAM(S): at 13:06

## 2024-05-27 RX ADMIN — ENOXAPARIN SODIUM 40 MILLIGRAM(S): 100 INJECTION SUBCUTANEOUS at 13:07

## 2024-05-27 RX ADMIN — Medication 10 MILLILITER(S): at 13:07

## 2024-05-27 RX ADMIN — PANTOPRAZOLE SODIUM 40 MILLIGRAM(S): 20 TABLET, DELAYED RELEASE ORAL at 05:20

## 2024-05-27 RX ADMIN — PIPERACILLIN AND TAZOBACTAM 25 GRAM(S): 4; .5 INJECTION, POWDER, LYOPHILIZED, FOR SOLUTION INTRAVENOUS at 09:12

## 2024-05-27 RX ADMIN — Medication 1 TABLET(S): at 13:06

## 2024-05-27 RX ADMIN — PIPERACILLIN AND TAZOBACTAM 25 GRAM(S): 4; .5 INJECTION, POWDER, LYOPHILIZED, FOR SOLUTION INTRAVENOUS at 02:35

## 2024-05-27 RX ADMIN — TAMSULOSIN HYDROCHLORIDE 0.4 MILLIGRAM(S): 0.4 CAPSULE ORAL at 23:00

## 2024-05-27 RX ADMIN — Medication 10 MILLILITER(S): at 05:20

## 2024-05-27 RX ADMIN — Medication 10 MILLILITER(S): at 23:00

## 2024-05-27 RX ADMIN — SENNA PLUS 2 TABLET(S): 8.6 TABLET ORAL at 13:46

## 2024-05-27 NOTE — CONSULT NOTE ADULT - ASSESSMENT
68-year-old male with past medical history of cerebral palsy, seizures, BPH, suprapubic catheter, PEG, wheelchair dependent who presents due to cough, congestion, sputum production and erythema by PEG tube.  Patient states that he has been coughing more lately with sputum production.  Per aide bedside, patient has been congested as well.  Home aide states that medications are given through peg tube and patient eats a pureed diet orally.  Patient has also been having some abdominal pain diffusely.  He denies nausea, vomiting, chest pain, headache, fever or chills.     CT a/p: Dilated redundant sigmoid colon measuring up to 9 cm containing large volume stool and gas. The remaining colon is diffusely dilated as well with gas and stool. No convincing evidence of sigmoid volvulus. Small bowel is nondistended. Nondistended bladder with suprapubic catheter, thickening of the bladder wall which is similar to prior CT dated 11/21/2022 and at least in part due to nondistention. Correlate with urinalysis if there is a concern for cystitis. 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    IMPRESSION/RECOMMENDATIONS  Immunosuppression/Immunosenescence ( above age 60 yrs there is a exponential decline in immunity which could result in poor clinical outcomes.  68-year-old male with past medical history of cerebral palsy, seizures, BPH, suprapubic catheter, PEG, wheelchair dependent who presents due to cough, congestion, sputum production and erythema by PEG tube.  Patient states that he has been coughing more lately with sputum production.  Per aide bedside, patient has been congested as well.  Home aide states that medications are given through peg tube and patient eats a pureed diet orally.  Patient has also been having some abdominal pain diffusely.  He denies nausea, vomiting, chest pain, headache, fever or chills.     CT a/p: Dilated redundant sigmoid colon measuring up to 9 cm containing large volume stool and gas. The remaining colon is diffusely dilated as well with gas and stool. No convincing evidence of sigmoid volvulus. Small bowel is nondistended. Nondistended bladder with suprapubic catheter, thickening of the bladder wall which is similar to prior CT dated 11/21/2022 and at least in part due to nondistention. Correlate with urinalysis if there is a concern for cystitis. 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    IMPRESSION/RECOMMENDATIONS  Immunosuppression/Immunosenescence ( above age 60 yrs there is a exponential decline in immunity which could result in poor clinical outcomes.   No convincing evidence of an infection  Aurora G tube has erythema which is inflammation secondary to leakage. No abscess/bacterial Cellulitis  No  infection. Pyuria not unusual with SPT  Clinically no bacterial PNA. High probability on pneumonitis secondary to aspiration  5/26 BCX NG    -Off loading to prevent pressure sores and preventive measures to avoid aspiration  -no ABx

## 2024-05-27 NOTE — CONSULT NOTE ADULT - SUBJECTIVE AND OBJECTIVE BOX
HAWA TISH  68y, Male  Allergy: No Known Allergies      All historical available data reviewed.    HPI:  68-year-old male with past medical history of cerebral palsy, seizures, BPH, suprapubic catheter, PEG, wheelchair dependent who presents due to cough, congestion, sputum production and erythema by PEG tube.  Patient states that he has been coughing more lately with sputum production.  Per aide bedside, patient has been congested as well.  Home aide states that medications are given through peg tube and patient eats a pureed diet orally.  Patient has also been having some abdominal pain diffusely.  He denies nausea, vomiting, chest pain, headache, fever or chills.     In the ED vitals: /114, , T 97.8, O2 sat 90% on room air  Labs: WBC 11.86, RVP negative, UA positive for mod leukocyte esterase  Chest xray: increased right sided opacities on my read  EKG sinus tachycardia  CT a/p: Dilated redundant sigmoid colon measuring up to 9 cm containing large volume stool and gas. The remaining colon is diffusely dilated as well with gas and stool. No convincing evidence of sigmoid volvulus. Small bowel is nondistended. Nondistended bladder with suprapubic catheter, thickening of the bladder wall which is similar to prior CT dated 2022 and at least in part due to nondistention. Correlate with urinalysis if there is a concern for cystitis. 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    In the ED patient was given: vanc , cefepime, 1.5L fluid bolus, magnesium and potassium  (26 May 2024 11:19)    FAMILY HISTORY:    PAST MEDICAL & SURGICAL HISTORY:  BPH (benign prostatic hyperplasia)      Cerebral palsy      Seizure  last seizure >10 years ago      Osteoporosis      Spastic quadriplegia      Urinary calculi      Urinary retention      Asthma      S/P percutaneous endoscopic gastrostomy (PEG) tube placement      H/O cystoscopy      Suprapubic catheter            VITALS:  T(F): 98, Max: 98.9 (24 @ 13:49)  HR: 76  BP: 118/60  RR: 19Vital Signs Last 24 Hrs  T(C): 36.7 (27 May 2024 04:40), Max: 37.2 (26 May 2024 13:49)  T(F): 98 (27 May 2024 04:40), Max: 98.9 (26 May 2024 13:49)  HR: 76 (27 May 2024 04:40) (76 - 107)  BP: 118/60 (27 May 2024 04:40) (115/78 - 170/66)  BP(mean): --  RR: 19 (27 May 2024 04:40) (18 - 19)  SpO2: 97% (26 May 2024 13:49) (95% - 97%)    Parameters below as of 26 May 2024 10:17  Patient On (Oxygen Delivery Method): nasal cannula  O2 Flow (L/min): 3      TESTS & MEASUREMENTS:                        13.6   11.86 )-----------( 241      ( 26 May 2024 04:20 )             39.8         139  |  104  |  12  ----------------------------<  132<H>  3.2<L>   |  26  |  0.5<L>    Ca    9.7      26 May 2024 04:20    TPro  7.1  /  Alb  4.3  /  TBili  0.2  /  DBili  x   /  AST  17  /  ALT  11  /  AlkPhos  83      LIVER FUNCTIONS - ( 26 May 2024 04:20 )  Alb: 4.3 g/dL / Pro: 7.1 g/dL / ALK PHOS: 83 U/L / ALT: 11 U/L / AST: 17 U/L / GGT: x             Urinalysis with Rflx Culture (collected 24 @ 04:20)      Urinalysis Basic - ( 26 May 2024 04:20 )    Color: Yellow / Appearance: Clear / S.017 / pH: x  Gluc: 132 mg/dL / Ketone: Negative mg/dL  / Bili: Negative / Urobili: 0.2 mg/dL   Blood: x / Protein: 100 mg/dL / Nitrite: Negative   Leuk Esterase: Moderate / RBC: 10 /HPF / WBC 58 /HPF   Sq Epi: x / Non Sq Epi: 1 /HPF / Bacteria: Many /HPF          RADIOLOGY & ADDITIONAL TESTS:  Personal review of radiological diagnostics performed  Echo and EKG results noted when applicable.     MEDICATIONS:  albuterol    90 MICROgram(s) HFA Inhaler 2 Puff(s) Inhalation every 6 hours PRN  enoxaparin Injectable 40 milliGRAM(s) SubCutaneous every 24 hours  guaifenesin/dextromethorphan Oral Liquid 10 milliLiter(s) Oral three times a day  multivitamin 1 Tablet(s) Oral daily  pantoprazole    Tablet 40 milliGRAM(s) Oral before breakfast  PHENobarbital 64.8 milliGRAM(s) Oral daily  piperacillin/tazobactam IVPB.. 3.375 Gram(s) IV Intermittent every 8 hours  senna 2 Tablet(s) Oral daily  tamsulosin 0.4 milliGRAM(s) Oral at bedtime      ANTIBIOTICS:  piperacillin/tazobactam IVPB.. 3.375 Gram(s) IV Intermittent every 8 hours

## 2024-05-27 NOTE — CONSULT NOTE ADULT - TIME BILLING
Counseled team about diagnostic testing and treatment plan. All questions answered. Abnormal lab/radiographical/microbiological data reviewed.

## 2024-05-28 ENCOUNTER — TRANSCRIPTION ENCOUNTER (OUTPATIENT)
Age: 69
End: 2024-05-28

## 2024-05-28 LAB
ANION GAP SERPL CALC-SCNC: 12 MMOL/L — SIGNIFICANT CHANGE UP (ref 7–14)
BUN SERPL-MCNC: 11 MG/DL — SIGNIFICANT CHANGE UP (ref 10–20)
CALCIUM SERPL-MCNC: 9 MG/DL — SIGNIFICANT CHANGE UP (ref 8.4–10.4)
CHLORIDE SERPL-SCNC: 103 MMOL/L — SIGNIFICANT CHANGE UP (ref 98–110)
CO2 SERPL-SCNC: 25 MMOL/L — SIGNIFICANT CHANGE UP (ref 17–32)
CREAT SERPL-MCNC: 0.6 MG/DL — LOW (ref 0.7–1.5)
EGFR: 105 ML/MIN/1.73M2 — SIGNIFICANT CHANGE UP
GLUCOSE SERPL-MCNC: 131 MG/DL — HIGH (ref 70–99)
HCT VFR BLD CALC: 39.8 % — LOW (ref 42–52)
HGB BLD-MCNC: 12.9 G/DL — LOW (ref 14–18)
MCHC RBC-ENTMCNC: 29.3 PG — SIGNIFICANT CHANGE UP (ref 27–31)
MCHC RBC-ENTMCNC: 32.4 G/DL — SIGNIFICANT CHANGE UP (ref 32–37)
MCV RBC AUTO: 90.5 FL — SIGNIFICANT CHANGE UP (ref 80–94)
NRBC # BLD: 0 /100 WBCS — SIGNIFICANT CHANGE UP (ref 0–0)
PLATELET # BLD AUTO: 228 K/UL — SIGNIFICANT CHANGE UP (ref 130–400)
PMV BLD: 10.8 FL — HIGH (ref 7.4–10.4)
POTASSIUM SERPL-MCNC: 4.2 MMOL/L — SIGNIFICANT CHANGE UP (ref 3.5–5)
POTASSIUM SERPL-SCNC: 4.2 MMOL/L — SIGNIFICANT CHANGE UP (ref 3.5–5)
RBC # BLD: 4.4 M/UL — LOW (ref 4.7–6.1)
RBC # FLD: 13.4 % — SIGNIFICANT CHANGE UP (ref 11.5–14.5)
SODIUM SERPL-SCNC: 140 MMOL/L — SIGNIFICANT CHANGE UP (ref 135–146)
WBC # BLD: 7.86 K/UL — SIGNIFICANT CHANGE UP (ref 4.8–10.8)
WBC # FLD AUTO: 7.86 K/UL — SIGNIFICANT CHANGE UP (ref 4.8–10.8)

## 2024-05-28 PROCEDURE — 99232 SBSQ HOSP IP/OBS MODERATE 35: CPT

## 2024-05-28 RX ORDER — PANTOPRAZOLE SODIUM 20 MG/1
40 TABLET, DELAYED RELEASE ORAL
Refills: 0 | Status: DISCONTINUED | OUTPATIENT
Start: 2024-05-28 | End: 2024-05-28

## 2024-05-28 RX ADMIN — Medication 10 MILLILITER(S): at 06:21

## 2024-05-28 RX ADMIN — ENOXAPARIN SODIUM 40 MILLIGRAM(S): 100 INJECTION SUBCUTANEOUS at 13:15

## 2024-05-28 RX ADMIN — Medication 64.8 MILLIGRAM(S): at 13:38

## 2024-05-28 RX ADMIN — PANTOPRAZOLE SODIUM 40 MILLIGRAM(S): 20 TABLET, DELAYED RELEASE ORAL at 06:21

## 2024-05-28 RX ADMIN — SENNA PLUS 2 TABLET(S): 8.6 TABLET ORAL at 12:23

## 2024-05-28 RX ADMIN — Medication 1 TABLET(S): at 12:22

## 2024-05-28 RX ADMIN — Medication 10 MILLILITER(S): at 14:28

## 2024-05-28 NOTE — DISCHARGE NOTE NURSING/CASE MANAGEMENT/SOCIAL WORK - NSDCPEFALRISK_GEN_ALL_CORE
For information on Fall & Injury Prevention, visit: [email or web address] OR  [email or web address] OR  https://www.cdc.gov/steadi/patient.html

## 2024-05-28 NOTE — DISCHARGE NOTE NURSING/CASE MANAGEMENT/SOCIAL WORK - PATIENT PORTAL LINK FT
You can access the FollowMyHealth Patient Portal offered by [facility] by registering at the following website: [email or web address]. By joining [facility]’s FollowMyHealth portal, you will also be able to view your health information using other applications (apps) compatible with our system.

## 2024-05-28 NOTE — DISCHARGE NOTE PROVIDER - CARE PROVIDER_API CALL
Rupesh Hernandez  Geriatric Medicine  [address] [place] [address]  Phone: [phone]  Fax: [phone]  Follow Up Time: 2 weeks

## 2024-05-28 NOTE — DIETITIAN INITIAL EVALUATION ADULT - ENERGY INTAKE
Adequate (%) Home aide at bedside reports pt with adequate intake on admission, >75% intake of breakfast today.

## 2024-05-28 NOTE — DIETITIAN INITIAL EVALUATION ADULT - ORAL INTAKE PTA/DIET HISTORY
Pt unable to provide hx, home health aide at bedside. Reports patient has a good appetite at home, tolerating pureed foods well. States pt receives medications via PEG and feeds by mouth with modified texture: pureed. Denies any significant wt hx, is not sure of UBW.  Pt unable to participate in interview, home health aide at bedside. Reports patient has a good appetite at home, tolerating pureed foods well. States pt receives medications via PEG and feeds by mouth with modified texture: pureed. Denies any significant wt hx, is not sure of UBW. Reports pt takes MVI oral liquid supplement at home.

## 2024-05-28 NOTE — DISCHARGE NOTE PROVIDER - NSDCFUADDINST_GEN_ALL_CORE_FT
-Off loading to prevent pressure sores and preventive measures to avoid aspiration  -no ABx  -sensicare cream around G tube

## 2024-05-28 NOTE — DIETITIAN INITIAL EVALUATION ADULT - ADD RECOMMEND
1. Continue current diet order as tolerated   2. Please encourage and assist PO intake prn     Pt at moderate risk f/u in 5-7 days.  1. Continue current diet order as tolerated   2. Please encourage and assist PO intake prn   3. Maintain aspiration risks as per SLP recs    Pt at moderate risk f/u in 5-7 days.

## 2024-05-28 NOTE — PROGRESS NOTE ADULT - ASSESSMENT
68-year-old male with past medical history of cerebral palsy, seizures, BPH, suprapubic catheter, PEG, wheelchair dependent who presents due to cough, congestion, sputum production and erythema by PEG tube.      # Acute Hypoxic Respiratory Failure     Likely due to Aspiration pneumonia/ Pneumonitis.  # Possible Cystitis  #Cellulitis surrounding PEG  -WBC 11.86, UA positive for mod leukocyte esterase, erythema and tenderness by PEG site, patient with cough and rhonchi on exam - improved  - weaned off O2  today to RA.   -chest x ray with increased right sided opacities compared to prior  -EKG sinus tachycardia  -CT a/p: Nondistended bladder with suprapubic catheter, thickening of the bladder wall which is similar to prior CT dated 11/21/2022 and at least in part due to nondistention. Correlate with urinalysis if there is a concern for cystitis. 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  -In ED patient was given cefepime and vanc   - official chest x-ray read >> no effusion, opacities   -s/p  zosyn - ID recs noted to keep off abx.   -procal  0.03  -s/s eval done - mod thick, pills through peg tube.     #Constipation  -CT demonstrated: Dilated redundant sigmoid colon measuring up to 9 cm containing large volume stool and gas. The remaining colon is diffusely dilated as well with gas and stool. No convincing evidence of sigmoid volvulus. Small bowel is nondistended.  -c/w home miralax and lactulose  - pt has 3 BMs yday    #Seizures  -c/w home phenobarbital     #BPH  -c/w home tamsulosin     DVT proph: lovenox subq  GI proph: PPI .    DC planning.  Dispo:  
  68-year-old male with past medical history of cerebral palsy, seizures, BPH, suprapubic catheter, PEG, wheelchair dependent who presents due to cough, congestion, sputum production and erythema by PEG tube.      # Acute Hypoxic Respiratory Failure     Likely due to Aspiration pneumonia/ Pneumonitis.  # Possible Cystitis  #Cellulitis surrounding PEG  -WBC 11.86, UA positive for mod leukocyte esterase, erythema and tenderness by PEG site, patient with cough and rhonchi on exam - improved  - wean down O2 as tolerated.   -chest x ray with increased right sided opacities compared to prior  -EKG sinus tachycardia  -CT a/p: Nondistended bladder with suprapubic catheter, thickening of the bladder wall which is similar to prior CT dated 11/21/2022 and at least in part due to nondistention. Correlate with urinalysis if there is a concern for cystitis. 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  -In ED patient was given cefepime and vanc   -follow up on official chest x-ray read  -s/p  zosyn - ID recs noted to keep off abx.   -obtain MRSA nares  -procal pending  -s/s eval done - mod thick, pills through peg tube.     #Constipation  -CT demonstrated: Dilated redundant sigmoid colon measuring up to 9 cm containing large volume stool and gas. The remaining colon is diffusely dilated as well with gas and stool. No convincing evidence of sigmoid volvulus. Small bowel is nondistended.  -c/w home miralax and lactulose  - pt has 3 BMs yday    #Seizures  -c/w home phenobarbital     #BPH  -c/w home tamsulosin     DVT proph: lovenox subq  GI proph: PPI .    [facility]  [facility] 
  68-year-old male with past medical history of cerebral palsy, seizures, BPH, suprapubic catheter, PEG, wheelchair dependent who presents due to cough, congestion, sputum production and erythema by PEG tube.      #Likely Aspiration pneumonia  #Cystitis  #Cellulitis surrounding PEG  -WBC 11.86, UA positive for mod leukocyte esterase, erythema and tenderness by PEG site, patient with cough and rhonchi on exam - improved  - wean down O2  -chest x ray with increased right sided opacities compared to prior  -EKG sinus tachycardia  -CT a/p: Nondistended bladder with suprapubic catheter, thickening of the bladder wall which is similar to prior CT dated 11/21/2022 and at least in part due to nondistention. Correlate with urinalysis if there is a concern for cystitis. 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  -In ED patient was given cefepime and vanc   -follow up on official chest x-ray read  -on zosyn - pending ID recs  -obtain MRSA nares  -procal pending  -ID eval  -s/s eval done - mod thick, pills through peg tube.     #Constipation  -CT demonstrated: Dilated redundant sigmoid colon measuring up to 9 cm containing large volume stool and gas. The remaining colon is diffusely dilated as well with gas and stool. No convincing evidence of sigmoid volvulus. Small bowel is nondistended.  -c/w home miralax and lactulose  - pt has 3 BMs yday    #Seizures  -c/w home phenobarbital     #BPH  -c/w home tamsulosin     DVT proph: lovenox subq  GI proph: PPI .  
· Assessment	  68-year-old male with past medical history of cerebral palsy, seizures, BPH, suprapubic catheter, PEG, wheelchair dependent who presents due to cough, congestion, sputum production and erythema by PEG tube.  Patient states that he has been coughing more lately with sputum production.  Per aide bedside, patient has been congested as well.  Home aide states that medications are given through peg tube and patient eats a pureed diet orally.  Patient has also been having some abdominal pain diffusely.  He denies nausea, vomiting, chest pain, headache, fever or chills.     CT a/p: Dilated redundant sigmoid colon measuring up to 9 cm containing large volume stool and gas. The remaining colon is diffusely dilated as well with gas and stool. No convincing evidence of sigmoid volvulus. Small bowel is nondistended. Nondistended bladder with suprapubic catheter, thickening of the bladder wall which is similar to prior CT dated 11/21/2022 and at least in part due to nondistention. Correlate with urinalysis if there is a concern for cystitis. 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    IMPRESSION/RECOMMENDATIONS  Immunosuppression/Immunosenescence ( above age 60 yrs there is a exponential decline in immunity which could result in poor clinical outcomes.   No convincing evidence of an infection  Aurora G tube has erythema which is inflammation secondary to leakage. No abscess/bacterial Cellulitis  No  infection. Pyuria not unusual with SPT  Clinically no bacterial PNA. High probability on pneumonitis secondary to aspiration  5/26 BCX NG  WBC 7.8    -Off loading to prevent pressure sores and preventive measures to avoid aspiration  -no ABx  -sensicare cream around G tube    Please do not hesitate to recall ID if any questions arise either through [employer] or through [employer]

## 2024-05-28 NOTE — DISCHARGE NOTE PROVIDER - NSDCCPCAREPLAN_GEN_ALL_CORE_FT
PRINCIPAL DISCHARGE DIAGNOSIS  Diagnosis: Pneumonia  Assessment and Plan of Treatment: You were evaluated for pneumonia and other sites of infection. But it did not reveal anything. You were severely constipated and needed disimpaction to relieve it. You are being monitored off abx and are stable for dc to group home.      SECONDARY DISCHARGE DIAGNOSES  Diagnosis: Constipation  Assessment and Plan of Treatment:     Diagnosis: Chronic UTI  Assessment and Plan of Treatment:

## 2024-05-28 NOTE — DISCHARGE NOTE PROVIDER - NSDCFUSCHEDAPPT_GEN_ALL_CORE_FT
[facility] Physician [facility]  UROLOGY [address] [place] [address]  Scheduled Appointment: 06/12/2024

## 2024-05-28 NOTE — DIETITIAN INITIAL EVALUATION ADULT - OTHER CALCULATIONS
RD bedscale wt: 64 kg as no wt noted in EMR  Estimated needs in consideration with age, BMI and chronic illness

## 2024-05-28 NOTE — DIETITIAN INITIAL EVALUATION ADULT - OTHER INFO
68-year-old male with past medical history of cerebral palsy, seizures, BPH, suprapubic catheter, PEG, wheelchair dependent who presents due to cough, congestion, sputum production and erythema by PEG tube.      # Acute Hypoxic Respiratory Failure - Likely due to Aspiration pneumonia/ Pneumonitis.  #Constipation - -c/w home miralax and lactulose

## 2024-05-28 NOTE — DIETITIAN INITIAL EVALUATION ADULT - PERTINENT MEDS FT
MEDICATIONS  (STANDING):  enoxaparin Injectable 40 milliGRAM(s) SubCutaneous every 24 hours  guaifenesin/dextromethorphan Oral Liquid 10 milliLiter(s) Oral three times a day  multivitamin 1 Tablet(s) Oral daily  pantoprazole   Suspension 40 milliGRAM(s) Enteral Tube before breakfast  PHENobarbital 64.8 milliGRAM(s) Oral daily  senna 2 Tablet(s) Oral daily  tamsulosin 0.4 milliGRAM(s) Oral at bedtime    MEDICATIONS  (PRN):  albuterol    90 MICROgram(s) HFA Inhaler 2 Puff(s) Inhalation every 6 hours PRN Shortness of Breath and/or Wheezing   Brow Lift Text: A midfrontal incision was made medially to the defect to allow access to the tissues just superior to the left eyebrow. Following careful dissection inferiorly in a supraperiosteal plane to the level of the left eyebrow, several 3-0 monocryl sutures were used to resuspend the eyebrow orbicularis oculi muscular unit to the superior frontal bone periosteum. This resulted in an appropriate reapproximation of static eyebrow symmetry and correction of the left brow ptosis.

## 2024-05-28 NOTE — DIETITIAN INITIAL EVALUATION ADULT - COLLABORATION WITH OTHER PROVIDERS
Intervention: meals and snacks, coordination of care  Monitoring/Evaluation: diet order, energy intake, weights, labs, NPFE, BG, GI s/s

## 2024-05-28 NOTE — DIETITIAN INITIAL EVALUATION ADULT - PERTINENT LABORATORY DATA
05-28    140  |  103  |  11  ----------------------------<  131<H>  4.2   |  25  |  0.6<L>    Ca    9.0      28 May 2024 07:05  Mg     2.2     05-27    TPro  5.7<L>  /  Alb  3.6  /  TBili  0.3  /  DBili  x   /  AST  18  /  ALT  9   /  AlkPhos  66  05-27

## 2024-05-28 NOTE — PROGRESS NOTE ADULT - SUBJECTIVE AND OBJECTIVE BOX
TISH SHAIKH  68y  Male      Patient is a 68y old  Male who presents with a chief complaint of pna.      INTERVAL HPI/OVERNIGHT EVENTS:      ******************************* REVIEW OF SYSTEMS:**********************************************    All other review of systems negative    *********************** VITALS ******************************************    T(F): 98.1 (05-27-24 @ 12:50)  HR: 66 (05-27-24 @ 12:50) (66 - 88)  BP: 117/65 (05-27-24 @ 12:50) (117/65 - 168/68)  RR: 18 (05-27-24 @ 12:50) (18 - 19)  SpO2: 96% (05-27-24 @ 12:50) (95% - 96%)    05-26-24 @ 07:01  -  05-27-24 @ 07:00  --------------------------------------------------------  IN: 0 mL / OUT: 1300 mL / NET: -1300 mL            05-26-24 @ 07:01  -  05-27-24 @ 07:00  --------------------------------------------------------  IN: 0 mL / OUT: 1300 mL / NET: -1300 mL        ******************************** PHYSICAL EXAM:**************************************************  GENERAL: NAD    PSYCH: no agitation, baseline mentation  HEENT:     NERVOUS SYSTEM:  Alert & awake x 2, mentally challenged   PULMONARY: IKER, CTA    CARDIOVASCULAR: S1S2 RRR    GI: Soft, NT, ND; BS present.    EXTREMITIES:  2+ Peripheral Pulses, No clubbing, cyanosis, or edema    LYMPH: No lymphadenopathy noted    SKIN: No rashes or lesions      **************************** LABS *******************************************************                          11.4   4.85  )-----------( 202      ( 27 May 2024 06:52 )             33.2     05-27    142  |  105  |  10  ----------------------------<  96  3.7   |  27  |  0.5<L>    Ca    8.1<L>      27 May 2024 06:52  Mg     2.2     05-27    TPro  5.7<L>  /  Alb  3.6  /  TBili  0.3  /  DBili  x   /  AST  18  /  ALT  9   /  AlkPhos  66  05-27      Urinalysis Basic - ( 27 May 2024 06:52 )    Color: x / Appearance: x / SG: x / pH: x  Gluc: 96 mg/dL / Ketone: x  / Bili: x / Urobili: x   Blood: x / Protein: x / Nitrite: x   Leuk Esterase: x / RBC: x / WBC x   Sq Epi: x / Non Sq Epi: x / Bacteria: x      PT/INR - ( 26 May 2024 04:20 )   PT: 11.40 sec;   INR: 1.00 ratio         PTT - ( 26 May 2024 04:20 )  PTT:30.5 sec  Lactate Trend  05-26 @ 04:20 Lactate:0.9         CAPILLARY BLOOD GLUCOSE              **************************Active Medications *******************************************  No Known Allergies      albuterol    90 MICROgram(s) HFA Inhaler 2 Puff(s) Inhalation every 6 hours PRN  enoxaparin Injectable 40 milliGRAM(s) SubCutaneous every 24 hours  guaifenesin/dextromethorphan Oral Liquid 10 milliLiter(s) Oral three times a day  multivitamin 1 Tablet(s) Oral daily  pantoprazole    Tablet 40 milliGRAM(s) Oral before breakfast  PHENobarbital 64.8 milliGRAM(s) Oral daily  senna 2 Tablet(s) Oral daily  tamsulosin 0.4 milliGRAM(s) Oral at bedtime      ***************************************************  RADIOLOGY & ADDITIONAL TESTS:    Imaging Personally Reviewed:  [ ] YES  [ ] NO    HEALTH ISSUES - PROBLEM Dx:      
  TISH SHAIKH  68y  Male      Patient is a 68y old  Male who presents with a chief complaint of Pneumonia due to infectious organism     (28 May 2024 12:15)      INTERVAL HPI/OVERNIGHT EVENTS:      ******************************* REVIEW OF SYSTEMS:**********************************************    All other review of systems negative    *********************** VITALS ******************************************    T(F): 97.7 (05-28-24 @ 13:12)  HR: 78 (05-28-24 @ 13:12) (68 - 85)  BP: 113/76 (05-28-24 @ 13:12) (113/76 - 119/72)  RR: 18 (05-28-24 @ 13:12) (16 - 18)  SpO2: 97% (05-28-24 @ 13:12) (97% - 97%)            ******************************** PHYSICAL EXAM:**************************************************  GENERAL: NAD    PSYCH: no agitation, baseline mentation  HEENT:     NERVOUS SYSTEM:  Alert & awake x 2, mentally challenged.    PULMONARY: IKER, CTA    CARDIOVASCULAR: S1S2 RRR    GI: Soft, NT, ND; BS present.    EXTREMITIES:  2+ Peripheral Pulses, No clubbing, cyanosis, or edema    LYMPH: No lymphadenopathy noted    SKIN: No rashes or lesions      **************************** LABS *******************************************************                          12.9   7.86  )-----------( 228      ( 28 May 2024 07:05 )             39.8     05-28    140  |  103  |  11  ----------------------------<  131<H>  4.2   |  25  |  0.6<L>    Ca    9.0      28 May 2024 07:05  Mg     2.2     05-27    TPro  5.7<L>  /  Alb  3.6  /  TBili  0.3  /  DBili  x   /  AST  18  /  ALT  9   /  AlkPhos  66  05-27      Urinalysis Basic - ( 28 May 2024 07:05 )    Color: x / Appearance: x / SG: x / pH: x  Gluc: 131 mg/dL / Ketone: x  / Bili: x / Urobili: x   Blood: x / Protein: x / Nitrite: x   Leuk Esterase: x / RBC: x / WBC x   Sq Epi: x / Non Sq Epi: x / Bacteria: x        Lactate Trend  05-26 @ 04:20 Lactate:0.9         CAPILLARY BLOOD GLUCOSE              **************************Active Medications *******************************************  No Known Allergies      albuterol    90 MICROgram(s) HFA Inhaler 2 Puff(s) Inhalation every 6 hours PRN  enoxaparin Injectable 40 milliGRAM(s) SubCutaneous every 24 hours  guaifenesin/dextromethorphan Oral Liquid 10 milliLiter(s) Oral three times a day  multivitamin 1 Tablet(s) Oral daily  pantoprazole   Suspension 40 milliGRAM(s) Enteral Tube before breakfast  PHENobarbital 64.8 milliGRAM(s) Oral daily  senna 2 Tablet(s) Oral daily  tamsulosin 0.4 milliGRAM(s) Oral at bedtime      ***************************************************  RADIOLOGY & ADDITIONAL TESTS:    Imaging Personally Reviewed:  [ ] YES  [ ] NO    HEALTH ISSUES - PROBLEM Dx:      
24H events:    Patient is a 68y old Male who presents with a chief complaint of   Primary diagnosis of Pneumonia    Family communication:  Contact date:  Name of person contacted:  Relationship to patient:  Communication details:  What matters most:    PAST MEDICAL & SURGICAL HISTORY  BPH (benign prostatic hyperplasia)    Cerebral palsy    Seizure  last seizure >10 years ago    Osteoporosis    Spastic quadriplegia    Urinary calculi    Urinary retention    Asthma    S/P percutaneous endoscopic gastrostomy (PEG) tube placement    H/O cystoscopy    Suprapubic catheter      SOCIAL HISTORY:  Social History:      ALLERGIES:  No Known Allergies    MEDICATIONS:  STANDING MEDICATIONS  enoxaparin Injectable 40 milliGRAM(s) SubCutaneous every 24 hours  guaifenesin/dextromethorphan Oral Liquid 10 milliLiter(s) Oral three times a day  multivitamin 1 Tablet(s) Oral daily  pantoprazole    Tablet 40 milliGRAM(s) Oral before breakfast  PHENobarbital 64.8 milliGRAM(s) Oral daily  piperacillin/tazobactam IVPB.. 3.375 Gram(s) IV Intermittent every 8 hours  senna 2 Tablet(s) Oral daily  tamsulosin 0.4 milliGRAM(s) Oral at bedtime    PRN MEDICATIONS  albuterol    90 MICROgram(s) HFA Inhaler 2 Puff(s) Inhalation every 6 hours PRN    VITALS:   T(F): 98  HR: 79  BP: 118/60  RR: 19  SpO2: 95%    PHYSICAL EXAM:  GENERAL:   (x  ) NAD, lying in bed comfortably     (  ) obtunded     (  ) lethargic     (  ) somnolent    HEAD:   ( x ) Atraumatic     (  ) hematoma     (  ) laceration (specify location:       )     NECK:  ( x ) Supple     (  ) neck stiffness     (  ) nuchal rigidity     (  )  no JVD     (  ) JVD present ( -- cm)    HEART:  Rate -->     ( x ) normal rate     (  ) bradycardic     (  ) tachycardic  Rhythm -->     (  x) regular     (  ) regularly irregular     (  ) irregularly irregular  Murmurs -->     (  ) normal s1s2     (  ) systolic murmur     (  ) diastolic murmur     (  ) continuous murmur      (  ) S3 present     (  ) S4 present    LUNGS:   (x  )Unlabored respirations     (  ) tachypnea  (  x) B/L air entry     (  ) decreased breath sounds in:  (location     )    (  ) no adventitious sound     (  ) crackles     (  ) wheezing      (  ) rhonchi      (specify location:       )  (  ) chest wall tenderness (specify location:       )    ABDOMEN:   ( x ) Soft     (  ) tense   |   ( x ) nondistended     (  ) distended   |   (  ) +BS     (  ) hypoactive bowel sounds     (  ) hyperactive bowel sounds  (  ) nontender     (  ) RUQ tenderness     (  ) RLQ tenderness     (  ) LLQ tenderness     (  ) epigastric tenderness     (  ) diffuse tenderness  (  ) Splenomegaly      (  ) Hepatomegaly      (  ) Jaundice     (  ) ecchymosis     EXTREMITIES:  ( x ) Normal     (  ) Rash     (  ) ecchymosis     (  ) varicose veins      (  ) pitting edema     (  ) non-pitting edema   (  ) ulceration     (  ) gangrene:     (location:     )    NERVOUS SYSTEM:  at baseline  (  ) A&Ox3     (  ) confused     (  ) lethargic  CN II-XII:     (  ) Intact     (  ) deficits found     (Specify:     )   Upper extremities:     (  ) no sensorimotor deficits     (  ) weakness     (  ) loss of proprioception/vibration     (  ) loss of touch/temperature (specify:    )  Lower extremities:     (  ) no sensorimotor deficits     (  ) weakness     (  ) loss of proprioception/vibration     (  ) loss of touch/temperature (specify:    )    LABS:                        11.4   4.85  )-----------( 202      ( 27 May 2024 06:52 )             33.2     05-27    142  |  105  |  10  ----------------------------<  96  3.7   |  27  |  0.5<L>    Ca    8.1<L>      27 May 2024 06:52  Mg     2.2     05-27    TPro  5.7<L>  /  Alb  3.6  /  TBili  0.3  /  DBili  x   /  AST  18  /  ALT  9   /  AlkPhos  66  05-27    PT/INR - ( 26 May 2024 04:20 )   PT: 11.40 sec;   INR: 1.00 ratio      PTT - ( 26 May 2024 04:20 )  PTT:30.5 sec  Urinalysis Basic - ( 27 May 2024 06:52 )    Color: x / Appearance: x / SG: x / pH: x  Gluc: 96 mg/dL / Ketone: x  / Bili: x / Urobili: x   Blood: x / Protein: x / Nitrite: x   Leuk Esterase: x / RBC: x / WBC x   Sq Epi: x / Non Sq Epi: x / Bacteria: x    Culture - Blood (collected 26 May 2024 04:30)  Source: .Blood Blood-Peripheral  Preliminary Report (27 May 2024 10:03):    No growth at 24 hours    Culture - Blood (collected 26 May 2024 04:20)  Source: .Blood Blood-Peripheral  Preliminary Report (27 May 2024 10:03):    No growth at 24 hours    Urinalysis with Rflx Culture (collected 26 May 2024 04:20)  
  HAWATISH SCHUSTER  68y, Male    All available historical data reviewed    OVERNIGHT EVENTS:  no fevers  no abd pain  has no complaints    ROS:  not reliable  VITALS:  T(F): 97.2, Max: 98.1 (05-27-24 @ 12:50)  HR: 85  BP: 119/72  RR: 16Vital Signs Last 24 Hrs  T(C): 36.2 (28 May 2024 07:34), Max: 36.7 (27 May 2024 12:50)  T(F): 97.2 (28 May 2024 07:34), Max: 98.1 (27 May 2024 12:50)  HR: 85 (28 May 2024 07:34) (66 - 85)  BP: 119/72 (28 May 2024 07:34) (117/65 - 119/72)  BP(mean): --  RR: 16 (28 May 2024 07:34) (16 - 18)  SpO2: 96% (27 May 2024 12:50) (95% - 96%)    Parameters below as of 27 May 2024 09:17  Patient On (Oxygen Delivery Method): room air        TESTS & MEASUREMENTS:                        12.9   7.86  )-----------( 228      ( 28 May 2024 07:05 )             39.8     05-27    142  |  105  |  10  ----------------------------<  96  3.7   |  27  |  0.5<L>    Ca    8.1<L>      27 May 2024 06:52  Mg     2.2     05-27    TPro  5.7<L>  /  Alb  3.6  /  TBili  0.3  /  DBili  x   /  AST  18  /  ALT  9   /  AlkPhos  66  05-27    LIVER FUNCTIONS - ( 27 May 2024 06:52 )  Alb: 3.6 g/dL / Pro: 5.7 g/dL / ALK PHOS: 66 U/L / ALT: 9 U/L / AST: 18 U/L / GGT: x             Culture - Blood (collected 05-26-24 @ 04:30)  Source: .Blood Blood-Peripheral  Preliminary Report (05-27-24 @ 10:03):    No growth at 24 hours    Culture - Blood (collected 05-26-24 @ 04:20)  Source: .Blood Blood-Peripheral  Preliminary Report (05-27-24 @ 10:03):    No growth at 24 hours    Urinalysis with Rflx Culture (collected 05-26-24 @ 04:20)      Urinalysis Basic - ( 27 May 2024 06:52 )    Color: x / Appearance: x / SG: x / pH: x  Gluc: 96 mg/dL / Ketone: x  / Bili: x / Urobili: x   Blood: x / Protein: x / Nitrite: x   Leuk Esterase: x / RBC: x / WBC x   Sq Epi: x / Non Sq Epi: x / Bacteria: x          RADIOLOGY & ADDITIONAL TESTS:  Personal review of radiological diagnostics performed  Echo and EKG results noted when applicable.     MEDICATIONS:  albuterol    90 MICROgram(s) HFA Inhaler 2 Puff(s) Inhalation every 6 hours PRN  enoxaparin Injectable 40 milliGRAM(s) SubCutaneous every 24 hours  guaifenesin/dextromethorphan Oral Liquid 10 milliLiter(s) Oral three times a day  multivitamin 1 Tablet(s) Oral daily  pantoprazole   Suspension 40 milliGRAM(s) Enteral Tube before breakfast  PHENobarbital 64.8 milliGRAM(s) Oral daily  senna 2 Tablet(s) Oral daily  tamsulosin 0.4 milliGRAM(s) Oral at bedtime      ANTIBIOTICS:

## 2024-05-28 NOTE — DISCHARGE NOTE PROVIDER - NSDCMRMEDTOKEN_GEN_ALL_CORE_FT
Albuterol (Eqv-ProAir HFA) 90 mcg/inh inhalation aerosol: 2 puff(s) inhaled every 6 hours  ammonium lactate topical lotion: Apply topically to affected area 2 times a day to feet  DermaPhor topical ointment: Apply topically to affected area once a day  docusate sodium 50 mg/5 mL oral solution: 30 milliliter(s) by gastrostomy tube once a day  guaifenesin-dextromethorphan 100 mg-10 mg/5 mL oral liquid: 10 milliliter(s) orally 3 times a day, As needed, Cough  lactulose 10 g/15 mL oral solution: 15 milliliter(s) by gastrostomy tube once a day at 8pm  miconazole 2% topical powder: Apply topically to affected area 2 times a day  Multiple Vitamins oral liquid: 1 teaspoon via g tube daily  omeprazole 40 mg oral delayed release capsule: 1 cap(s) orally once a day  Oyster Shell 500 (1250 mg calcium carbonate) oral tablet: 1 tab(s) orally 2 times a day  PHENobarbital 64.8 mg oral tablet: 1 tab(s) orally once a day via G tube  polyethylene glycol 3350 oral powder for reconstitution: orally 2 times a day  Refresh ophthalmic solution: 1 drop(s) to each affected eye 3 times a day  tamsulosin 0.4 mg oral capsule: 1 cap(s) orally once a day (at bedtime)

## 2024-05-28 NOTE — DISCHARGE NOTE PROVIDER - HOSPITAL COURSE
68-year-old male with past medical history of cerebral palsy, seizures, BPH, suprapubic catheter, PEG, wheelchair dependent who presents due to cough, congestion, sputum production and erythema by PEG tube.  Patient states that he has been coughing more lately with sputum production.  Per aide bedside, patient has been congested as well.  Home aide states that medications are given through peg tube and patient eats a pureed diet orally.  Patient has also been having some abdominal pain diffusely.  He denies nausea, vomiting, chest pain, headache, fever or chills.     In the ED vitals: /114, , T 97.8, O2 sat 90% on room air  Labs: WBC 11.86, RVP negative, UA positive for mod leukocyte esterase  Chest xray: increased right sided opacities on my read  EKG sinus tachycardia  CT a/p: Dilated redundant sigmoid colon measuring up to 9 cm containing large volume stool and gas. The remaining colon is diffusely dilated as well with gas and stool. No convincing evidence of sigmoid volvulus. Small bowel is nondistended. Nondistended bladder with suprapubic catheter, thickening of the bladder wall which is similar to prior CT dated 11/21/2022 and at least in part due to nondistention. Correlate with urinalysis if there is a concern for cystitis. 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    In the ED patient was given: vanc , cefepime, 1.5L fluid bolus, magnesium and potassium     A/P;    68-year-old male with past medical history of cerebral palsy, seizures, BPH, suprapubic catheter, PEG, wheelchair dependent who presents due to cough, congestion, sputum production and erythema by PEG tube.      # Acute Hypoxic Respiratory Failure     Likely due to Aspiration pneumonia/ Pneumonitis.  # Possible Cystitis  #Cellulitis surrounding PEG  -WBC 11.86, UA positive for mod leukocyte esterase, erythema and tenderness by PEG site, patient with cough and rhonchi on exam - improved  - wean down O2 as tolerated.   -chest x ray with increased right sided opacities compared to prior  -CT a/p: Nondistended bladder with suprapubic catheter, thickening of the bladder wall which is similar to prior CT dated 11/21/2022 and at least in part due to nondistention. Correlate with urinalysis if there is a concern for cystitis. 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  -In ED patient was given cefepime and vanc   - no cellulits near PEG, OFF abx per ID  -procal - neg  -s/s eval done - mod thick, pills through peg tube.     #Constipation  -CT demonstrated: Dilated redundant sigmoid colon measuring up to 9 cm containing large volume stool and gas. The remaining colon is diffusely dilated as well with gas and stool. No convincing evidence of sigmoid volvulus. Small bowel is nondistended.  -c/w home miralax and lactulose  - pt has 3 BMs yday    #Seizures  -c/w home phenobarbital     #BPH  -c/w home tamsulosin     DVT proph: lovenox subq  GI proph: PPI .

## 2024-05-29 VITALS
SYSTOLIC BLOOD PRESSURE: 128 MMHG | RESPIRATION RATE: 18 BRPM | TEMPERATURE: 98 F | DIASTOLIC BLOOD PRESSURE: 73 MMHG | HEART RATE: 87 BPM

## 2024-05-29 LAB
-  AMIKACIN: SIGNIFICANT CHANGE UP
-  AZTREONAM: SIGNIFICANT CHANGE UP
-  CEFEPIME: SIGNIFICANT CHANGE UP
-  CEFTAZIDIME: SIGNIFICANT CHANGE UP
-  CIPROFLOXACIN: SIGNIFICANT CHANGE UP
-  IMIPENEM: SIGNIFICANT CHANGE UP
-  LEVOFLOXACIN: SIGNIFICANT CHANGE UP
-  MEROPENEM: SIGNIFICANT CHANGE UP
-  PIPERACILLIN/TAZOBACTAM: SIGNIFICANT CHANGE UP
CULTURE RESULTS: ABNORMAL
METHOD TYPE: SIGNIFICANT CHANGE UP
ORGANISM # SPEC MICROSCOPIC CNT: ABNORMAL
ORGANISM # SPEC MICROSCOPIC CNT: SIGNIFICANT CHANGE UP
SPECIMEN SOURCE: SIGNIFICANT CHANGE UP

## 2024-06-03 DIAGNOSIS — N40.0 BENIGN PROSTATIC HYPERPLASIA WITHOUT LOWER URINARY TRACT SYMPTOMS: ICD-10-CM

## 2024-06-03 DIAGNOSIS — M81.0 AGE-RELATED OSTEOPOROSIS WITHOUT CURRENT PATHOLOGICAL FRACTURE: ICD-10-CM

## 2024-06-03 DIAGNOSIS — G40.909 EPILEPSY, UNSPECIFIED, NOT INTRACTABLE, WITHOUT STATUS EPILEPTICUS: ICD-10-CM

## 2024-06-03 DIAGNOSIS — J69.0 PNEUMONITIS DUE TO INHALATION OF FOOD AND VOMIT: ICD-10-CM

## 2024-06-03 DIAGNOSIS — J45.909 UNSPECIFIED ASTHMA, UNCOMPLICATED: ICD-10-CM

## 2024-06-03 DIAGNOSIS — N39.0 URINARY TRACT INFECTION, SITE NOT SPECIFIED: ICD-10-CM

## 2024-06-03 DIAGNOSIS — M84.452K: ICD-10-CM

## 2024-06-03 DIAGNOSIS — Z93.1 GASTROSTOMY STATUS: ICD-10-CM

## 2024-06-03 DIAGNOSIS — G80.0 SPASTIC QUADRIPLEGIC CEREBRAL PALSY: ICD-10-CM

## 2024-06-03 DIAGNOSIS — Z99.3 DEPENDENCE ON WHEELCHAIR: ICD-10-CM

## 2024-06-03 DIAGNOSIS — K59.00 CONSTIPATION, UNSPECIFIED: ICD-10-CM

## 2024-06-03 DIAGNOSIS — J96.01 ACUTE RESPIRATORY FAILURE WITH HYPOXIA: ICD-10-CM

## 2024-06-12 ENCOUNTER — APPOINTMENT (OUTPATIENT)
Dept: UROLOGY | Facility: CLINIC | Age: 69
End: 2024-06-12
Payer: MEDICARE

## 2024-06-12 PROCEDURE — 51705 CHANGE OF BLADDER TUBE: CPT

## 2024-06-21 RX ORDER — TAMSULOSIN HYDROCHLORIDE 0.4 MG/1
0.4 CAPSULE ORAL
Qty: 90 | Refills: 3 | Status: ACTIVE | COMMUNITY
Start: 2021-08-09 | End: 1900-01-01

## 2024-07-09 ENCOUNTER — APPOINTMENT (OUTPATIENT)
Dept: UROLOGY | Facility: CLINIC | Age: 69
End: 2024-07-09
Payer: MEDICARE

## 2024-07-09 PROCEDURE — 51705 CHANGE OF BLADDER TUBE: CPT

## 2024-07-16 NOTE — ED ADULT TRIAGE NOTE - TEMPERATURE IN FAHRENHEIT (DEGREES F)
Caller: KIRSTIE ANDRADE    Relationship: Emergency Contact    Best call back number: [phone]    What form or medical record are you requesting: WORK NOTE    Who is requesting this form or medical record from you: EMPLOYER    How would you like to receive the form or medical records (pick-up, mail, fax): PICK-UP    Timeframe paperwork needed: ASAP    Additional notes: PATIENT'S EMPLOYER IS NEEDING A NOTE THAT INCLUDES THE DATE OF HER ORTHOPEDIC SURGERY APPT. CURRENT NOTE ONLY INDICATES THAT SHE SHOULD REMAIN OFF WORK UNTIL THIS APPT, BUT DOES NOT INCLUDE THAT IT IS ON 7/22/24. PLEASE CALL PATIENT TO UPDATE.   98.2

## 2024-08-06 ENCOUNTER — APPOINTMENT (OUTPATIENT)
Dept: UROLOGY | Facility: CLINIC | Age: 69
End: 2024-08-06

## 2024-08-06 PROCEDURE — 51705 CHANGE OF BLADDER TUBE: CPT

## 2024-09-06 ENCOUNTER — INPATIENT (INPATIENT)
Facility: HOSPITAL | Age: 69
LOS: 3 days | Discharge: NURSING, REHAB OR LONG-TERM CARE | DRG: 696 | End: 2024-09-10
Attending: INTERNAL MEDICINE | Admitting: STUDENT IN AN ORGANIZED HEALTH CARE EDUCATION/TRAINING PROGRAM
Payer: MEDICARE

## 2024-09-06 VITALS
DIASTOLIC BLOOD PRESSURE: 62 MMHG | SYSTOLIC BLOOD PRESSURE: 121 MMHG | OXYGEN SATURATION: 94 % | RESPIRATION RATE: 16 BRPM | HEART RATE: 88 BPM | TEMPERATURE: 99 F

## 2024-09-06 DIAGNOSIS — Z93.1 GASTROSTOMY STATUS: Chronic | ICD-10-CM

## 2024-09-06 DIAGNOSIS — Z98.890 OTHER SPECIFIED POSTPROCEDURAL STATES: Chronic | ICD-10-CM

## 2024-09-06 DIAGNOSIS — Z93.59 OTHER CYSTOSTOMY STATUS: Chronic | ICD-10-CM

## 2024-09-06 LAB
ALBUMIN SERPL ELPH-MCNC: 4.1 G/DL — SIGNIFICANT CHANGE UP (ref 3.5–5.2)
ALP SERPL-CCNC: 94 U/L — SIGNIFICANT CHANGE UP (ref 30–115)
ALT FLD-CCNC: 10 U/L — SIGNIFICANT CHANGE UP (ref 0–41)
ANION GAP SERPL CALC-SCNC: 13 MMOL/L — SIGNIFICANT CHANGE UP (ref 7–14)
AST SERPL-CCNC: 21 U/L — SIGNIFICANT CHANGE UP (ref 0–41)
BASOPHILS # BLD AUTO: 0.03 K/UL — SIGNIFICANT CHANGE UP (ref 0–0.2)
BASOPHILS NFR BLD AUTO: 0.4 % — SIGNIFICANT CHANGE UP (ref 0–1)
BILIRUB SERPL-MCNC: <0.2 MG/DL — SIGNIFICANT CHANGE UP (ref 0.2–1.2)
BUN SERPL-MCNC: 10 MG/DL — SIGNIFICANT CHANGE UP (ref 10–20)
CALCIUM SERPL-MCNC: 9.1 MG/DL — SIGNIFICANT CHANGE UP (ref 8.4–10.5)
CHLORIDE SERPL-SCNC: 99 MMOL/L — SIGNIFICANT CHANGE UP (ref 98–110)
CO2 SERPL-SCNC: 24 MMOL/L — SIGNIFICANT CHANGE UP (ref 17–32)
CREAT SERPL-MCNC: 0.6 MG/DL — LOW (ref 0.7–1.5)
EGFR: 105 ML/MIN/1.73M2 — SIGNIFICANT CHANGE UP
EOSINOPHIL # BLD AUTO: 0.23 K/UL — SIGNIFICANT CHANGE UP (ref 0–0.7)
EOSINOPHIL NFR BLD AUTO: 3.1 % — SIGNIFICANT CHANGE UP (ref 0–8)
GLUCOSE SERPL-MCNC: 97 MG/DL — SIGNIFICANT CHANGE UP (ref 70–99)
HCT VFR BLD CALC: 40.2 % — LOW (ref 42–52)
HGB BLD-MCNC: 13.6 G/DL — LOW (ref 14–18)
IMM GRANULOCYTES NFR BLD AUTO: 0.5 % — HIGH (ref 0.1–0.3)
LYMPHOCYTES # BLD AUTO: 1.29 K/UL — SIGNIFICANT CHANGE UP (ref 1.2–3.4)
LYMPHOCYTES # BLD AUTO: 17.2 % — LOW (ref 20.5–51.1)
MCHC RBC-ENTMCNC: 30.3 PG — SIGNIFICANT CHANGE UP (ref 27–31)
MCHC RBC-ENTMCNC: 33.8 G/DL — SIGNIFICANT CHANGE UP (ref 32–37)
MCV RBC AUTO: 89.5 FL — SIGNIFICANT CHANGE UP (ref 80–94)
MONOCYTES # BLD AUTO: 0.76 K/UL — HIGH (ref 0.1–0.6)
MONOCYTES NFR BLD AUTO: 10.1 % — HIGH (ref 1.7–9.3)
NEUTROPHILS # BLD AUTO: 5.14 K/UL — SIGNIFICANT CHANGE UP (ref 1.4–6.5)
NEUTROPHILS NFR BLD AUTO: 68.7 % — SIGNIFICANT CHANGE UP (ref 42.2–75.2)
NRBC # BLD: 0 /100 WBCS — SIGNIFICANT CHANGE UP (ref 0–0)
PLATELET # BLD AUTO: 230 K/UL — SIGNIFICANT CHANGE UP (ref 130–400)
PMV BLD: 10.5 FL — HIGH (ref 7.4–10.4)
POTASSIUM SERPL-MCNC: 4.9 MMOL/L — SIGNIFICANT CHANGE UP (ref 3.5–5)
POTASSIUM SERPL-SCNC: 4.9 MMOL/L — SIGNIFICANT CHANGE UP (ref 3.5–5)
PROT SERPL-MCNC: 7.2 G/DL — SIGNIFICANT CHANGE UP (ref 6–8)
RBC # BLD: 4.49 M/UL — LOW (ref 4.7–6.1)
RBC # FLD: 13.5 % — SIGNIFICANT CHANGE UP (ref 11.5–14.5)
SODIUM SERPL-SCNC: 136 MMOL/L — SIGNIFICANT CHANGE UP (ref 135–146)
WBC # BLD: 7.49 K/UL — SIGNIFICANT CHANGE UP (ref 4.8–10.8)
WBC # FLD AUTO: 7.49 K/UL — SIGNIFICANT CHANGE UP (ref 4.8–10.8)

## 2024-09-06 PROCEDURE — 51705 CHANGE OF BLADDER TUBE: CPT

## 2024-09-06 PROCEDURE — 99285 EMERGENCY DEPT VISIT HI MDM: CPT | Mod: FS,25

## 2024-09-06 NOTE — ED PROVIDER NOTE - PHYSICAL EXAMINATION
CONST: in NAD  EYES: EOMI, Sclera and conjunctiva clear.   ENT: No nasal discharge  NECK: Non-tender, no meningeal signs  CARD: Normal S1 S2; Normal rate and rhythm  RESP: Equal BS B/L, No wheezes, rhonchi or rales. No distress  GI: Soft, non-tender, non-distended. PEG tube; suprapubic catheter  MS: b/l UE contractions  SKIN: Warm, dry, Good turgor

## 2024-09-06 NOTE — ED PROVIDER NOTE - ATTENDING APP SHARED VISIT CONTRIBUTION OF CARE
Patient Requesting a refill.    Medication(s) for Milton Constantino submitted for a refill request and is pending approval from the Provider.    Caller has been advised that their call does not guarantee an immediate refill. This refill will be reviewed within 24-72 hours by a qualified provider who will determine whether he or she can refill the medication.    Patient has contacted the pharmacy?  Yes    [facility] Response: N/A    Call Back Number: [phone]      Can a detailed message be left? yes    Additional information:     Patient is completely out of medications   Patient discharged from [facility] 4/16/22. 3 medications that were prescribed to him are not on his medication list. Patient would like to make sure he continues what was prescribed in hospital.  Dosages were not available  Pantoprazole  Glimeperide  Allopurinol    Patient’s preferred pharmacy has been noted and populated.       [id number] - [place], [place] - [address] [place] [address] [address] [place] [id number]  Phone: [phone] Fax: [phone]     68-year-old male, past medical history of cerebral palsy, seizures, BPH, suprapubic catheter, PEG tube, wheelchair dependent presents for suprapubic discomfort and decreased urination. No fever/chills, CP/SOB, abdominal pain, n/v/c/d. On exam, pt in NAD, AAOx3, head NC/AT, lungs CTA B/L, CV S1S2 regular, abdomen soft/NT/ND/(+)BS/(+)PEG tube/(+)suprapubic alatorre. Alatorre changed. Will do labs/UA and reevaluate.

## 2024-09-06 NOTE — ED PROVIDER NOTE - DIFFERENTIAL DIAGNOSIS
Differential Diagnosis Electrolyte abnormalities, dehydration, LANA, hyperglycemia, hypoglycemia, symptomatic anemia.

## 2024-09-06 NOTE — ED PROVIDER NOTE - OBJECTIVE STATEMENT
patient is a 67yo male PMH cerebral palsy, seizure disorder, bph, PEG tube, suprapubic catheter coming to ED with reduced urinary output from suprapubic catheter. pt complaining of burning/ discomfort when urine does drain. aid bedside reports x3 days pt has had minimal urine output. suprapubic has been placed for years, most recently changed last week and was found to have UTI, currently taking abx (pt unable to tell name of abx). denies fever, abdominal pain, chest pain, SOB, n,v,d,c.

## 2024-09-07 DIAGNOSIS — R33.9 RETENTION OF URINE, UNSPECIFIED: ICD-10-CM

## 2024-09-07 LAB
A1C WITH ESTIMATED AVERAGE GLUCOSE RESULT: 5.6 % — SIGNIFICANT CHANGE UP (ref 4–5.6)
ALBUMIN SERPL ELPH-MCNC: 3.8 G/DL — SIGNIFICANT CHANGE UP (ref 3.5–5.2)
ALP SERPL-CCNC: 88 U/L — SIGNIFICANT CHANGE UP (ref 30–115)
ALT FLD-CCNC: 8 U/L — SIGNIFICANT CHANGE UP (ref 0–41)
ANION GAP SERPL CALC-SCNC: 11 MMOL/L — SIGNIFICANT CHANGE UP (ref 7–14)
APPEARANCE UR: ABNORMAL
AST SERPL-CCNC: 14 U/L — SIGNIFICANT CHANGE UP (ref 0–41)
BASOPHILS # BLD AUTO: 0.03 K/UL — SIGNIFICANT CHANGE UP (ref 0–0.2)
BASOPHILS NFR BLD AUTO: 0.4 % — SIGNIFICANT CHANGE UP (ref 0–1)
BILIRUB SERPL-MCNC: <0.2 MG/DL — SIGNIFICANT CHANGE UP (ref 0.2–1.2)
BILIRUB UR-MCNC: NEGATIVE — SIGNIFICANT CHANGE UP
BUN SERPL-MCNC: 11 MG/DL — SIGNIFICANT CHANGE UP (ref 10–20)
CALCIUM SERPL-MCNC: 8.8 MG/DL — SIGNIFICANT CHANGE UP (ref 8.4–10.5)
CHLORIDE SERPL-SCNC: 102 MMOL/L — SIGNIFICANT CHANGE UP (ref 98–110)
CHOLEST SERPL-MCNC: 169 MG/DL — SIGNIFICANT CHANGE UP
CO2 SERPL-SCNC: 27 MMOL/L — SIGNIFICANT CHANGE UP (ref 17–32)
COLOR SPEC: YELLOW — SIGNIFICANT CHANGE UP
CREAT SERPL-MCNC: 0.5 MG/DL — LOW (ref 0.7–1.5)
DIFF PNL FLD: ABNORMAL
EGFR: 111 ML/MIN/1.73M2 — SIGNIFICANT CHANGE UP
EOSINOPHIL # BLD AUTO: 0.22 K/UL — SIGNIFICANT CHANGE UP (ref 0–0.7)
EOSINOPHIL NFR BLD AUTO: 3 % — SIGNIFICANT CHANGE UP (ref 0–8)
ESTIMATED AVERAGE GLUCOSE: 114 MG/DL — SIGNIFICANT CHANGE UP (ref 68–114)
GLUCOSE SERPL-MCNC: 107 MG/DL — HIGH (ref 70–99)
GLUCOSE UR QL: NEGATIVE MG/DL — SIGNIFICANT CHANGE UP
HCT VFR BLD CALC: 37.6 % — LOW (ref 42–52)
HDLC SERPL-MCNC: 47 MG/DL — SIGNIFICANT CHANGE UP
HGB BLD-MCNC: 12.6 G/DL — LOW (ref 14–18)
IMM GRANULOCYTES NFR BLD AUTO: 0.4 % — HIGH (ref 0.1–0.3)
KETONES UR-MCNC: NEGATIVE MG/DL — SIGNIFICANT CHANGE UP
LEUKOCYTE ESTERASE UR-ACNC: ABNORMAL
LIPID PNL WITH DIRECT LDL SERPL: 100 MG/DL — HIGH
LYMPHOCYTES # BLD AUTO: 1.12 K/UL — LOW (ref 1.2–3.4)
LYMPHOCYTES # BLD AUTO: 15.4 % — LOW (ref 20.5–51.1)
MCHC RBC-ENTMCNC: 30.1 PG — SIGNIFICANT CHANGE UP (ref 27–31)
MCHC RBC-ENTMCNC: 33.5 G/DL — SIGNIFICANT CHANGE UP (ref 32–37)
MCV RBC AUTO: 90 FL — SIGNIFICANT CHANGE UP (ref 80–94)
MONOCYTES # BLD AUTO: 0.74 K/UL — HIGH (ref 0.1–0.6)
MONOCYTES NFR BLD AUTO: 10.2 % — HIGH (ref 1.7–9.3)
NEUTROPHILS # BLD AUTO: 5.12 K/UL — SIGNIFICANT CHANGE UP (ref 1.4–6.5)
NEUTROPHILS NFR BLD AUTO: 70.6 % — SIGNIFICANT CHANGE UP (ref 42.2–75.2)
NITRITE UR-MCNC: POSITIVE
NON HDL CHOLESTEROL: 122 MG/DL — SIGNIFICANT CHANGE UP
NRBC # BLD: 0 /100 WBCS — SIGNIFICANT CHANGE UP (ref 0–0)
PH UR: 7.5 — SIGNIFICANT CHANGE UP (ref 5–8)
PLATELET # BLD AUTO: 224 K/UL — SIGNIFICANT CHANGE UP (ref 130–400)
PMV BLD: 10.4 FL — SIGNIFICANT CHANGE UP (ref 7.4–10.4)
POTASSIUM SERPL-MCNC: 3.8 MMOL/L — SIGNIFICANT CHANGE UP (ref 3.5–5)
POTASSIUM SERPL-SCNC: 3.8 MMOL/L — SIGNIFICANT CHANGE UP (ref 3.5–5)
PROT SERPL-MCNC: 6.3 G/DL — SIGNIFICANT CHANGE UP (ref 6–8)
PROT UR-MCNC: 300 MG/DL
RBC # BLD: 4.18 M/UL — LOW (ref 4.7–6.1)
RBC # FLD: 13.6 % — SIGNIFICANT CHANGE UP (ref 11.5–14.5)
SODIUM SERPL-SCNC: 140 MMOL/L — SIGNIFICANT CHANGE UP (ref 135–146)
SP GR SPEC: 1.01 — SIGNIFICANT CHANGE UP (ref 1–1.03)
TRIGL SERPL-MCNC: 110 MG/DL — SIGNIFICANT CHANGE UP
UROBILINOGEN FLD QL: 0.2 MG/DL — SIGNIFICANT CHANGE UP (ref 0.2–1)
WBC # BLD: 7.26 K/UL — SIGNIFICANT CHANGE UP (ref 4.8–10.8)
WBC # FLD AUTO: 7.26 K/UL — SIGNIFICANT CHANGE UP (ref 4.8–10.8)

## 2024-09-07 PROCEDURE — 76857 US EXAM PELVIC LIMITED: CPT

## 2024-09-07 PROCEDURE — 92610 EVALUATE SWALLOWING FUNCTION: CPT | Mod: GN

## 2024-09-07 PROCEDURE — 74018 RADEX ABDOMEN 1 VIEW: CPT | Mod: 26

## 2024-09-07 PROCEDURE — 74018 RADEX ABDOMEN 1 VIEW: CPT

## 2024-09-07 PROCEDURE — 80061 LIPID PANEL: CPT

## 2024-09-07 PROCEDURE — 85025 COMPLETE CBC W/AUTO DIFF WBC: CPT

## 2024-09-07 PROCEDURE — 99222 1ST HOSP IP/OBS MODERATE 55: CPT | Mod: AI

## 2024-09-07 PROCEDURE — 80053 COMPREHEN METABOLIC PANEL: CPT

## 2024-09-07 PROCEDURE — 36415 COLL VENOUS BLD VENIPUNCTURE: CPT

## 2024-09-07 PROCEDURE — 83735 ASSAY OF MAGNESIUM: CPT

## 2024-09-07 PROCEDURE — 83036 HEMOGLOBIN GLYCOSYLATED A1C: CPT

## 2024-09-07 RX ORDER — BACLOFEN 0.5 MG/ML
5 INJECTION INTRATHECAL EVERY 8 HOURS
Refills: 0 | Status: DISCONTINUED | OUTPATIENT
Start: 2024-09-07 | End: 2024-09-10

## 2024-09-07 RX ORDER — CALCIUM CARBONATE 500(1250)
1 TABLET ORAL
Refills: 0 | Status: DISCONTINUED | OUTPATIENT
Start: 2024-09-07 | End: 2024-09-10

## 2024-09-07 RX ORDER — POLYETHYLENE GLYCOL 3350 17 G/17G
17 POWDER, FOR SOLUTION ORAL
Refills: 0 | Status: DISCONTINUED | OUTPATIENT
Start: 2024-09-07 | End: 2024-09-10

## 2024-09-07 RX ORDER — ACETAMINOPHEN 325 MG/1
650 TABLET ORAL EVERY 6 HOURS
Refills: 0 | Status: DISCONTINUED | OUTPATIENT
Start: 2024-09-07 | End: 2024-09-10

## 2024-09-07 RX ORDER — LACTULOSE 10 G
20 PACKET (EA) ORAL EVERY 12 HOURS
Refills: 0 | Status: DISCONTINUED | OUTPATIENT
Start: 2024-09-07 | End: 2024-09-10

## 2024-09-07 RX ORDER — PHENOBARBITAL 15 MG/1
64.8 TABLET ORAL DAILY
Refills: 0 | Status: DISCONTINUED | OUTPATIENT
Start: 2024-09-07 | End: 2024-09-10

## 2024-09-07 RX ORDER — PANTOPRAZOLE SODIUM 40 MG
40 TABLET, DELAYED RELEASE (ENTERIC COATED) ORAL
Refills: 0 | Status: DISCONTINUED | OUTPATIENT
Start: 2024-09-07 | End: 2024-09-10

## 2024-09-07 RX ORDER — CASEIN/A,D/METHYLBNZ/PETROLAT
1 OINTMENT (GRAM) TOPICAL DAILY
Refills: 0 | Status: DISCONTINUED | OUTPATIENT
Start: 2024-09-07 | End: 2024-09-10

## 2024-09-07 RX ORDER — ENOXAPARIN SODIUM 100 MG/ML
40 INJECTION SUBCUTANEOUS EVERY 24 HOURS
Refills: 0 | Status: DISCONTINUED | OUTPATIENT
Start: 2024-09-07 | End: 2024-09-10

## 2024-09-07 RX ORDER — ONDANSETRON 2 MG/ML
4 INJECTION, SOLUTION INTRAMUSCULAR; INTRAVENOUS EVERY 8 HOURS
Refills: 0 | Status: DISCONTINUED | OUTPATIENT
Start: 2024-09-07 | End: 2024-09-10

## 2024-09-07 RX ORDER — MAGNESIUM, ALUMINUM HYDROXIDE 200-225/5
30 SUSPENSION, ORAL (FINAL DOSE FORM) ORAL EVERY 4 HOURS
Refills: 0 | Status: DISCONTINUED | OUTPATIENT
Start: 2024-09-07 | End: 2024-09-10

## 2024-09-07 RX ORDER — AMMONIUM LACTATE 12 %
1 LOTION (GRAM) TOPICAL EVERY 12 HOURS
Refills: 0 | Status: DISCONTINUED | OUTPATIENT
Start: 2024-09-07 | End: 2024-09-10

## 2024-09-07 RX ORDER — TAMSULOSIN HYDROCHLORIDE 0.4 MG/1
0.4 CAPSULE ORAL AT BEDTIME
Refills: 0 | Status: DISCONTINUED | OUTPATIENT
Start: 2024-09-07 | End: 2024-09-10

## 2024-09-07 RX ADMIN — Medication 1 APPLICATION(S): at 06:47

## 2024-09-07 RX ADMIN — Medication 100 MILLIGRAM(S): at 00:48

## 2024-09-07 RX ADMIN — Medication 1 APPLICATION(S): at 17:13

## 2024-09-07 RX ADMIN — Medication 200 MILLIGRAM(S): at 06:34

## 2024-09-07 RX ADMIN — Medication 20 GRAM(S): at 06:35

## 2024-09-07 RX ADMIN — BACLOFEN 5 MILLIGRAM(S): 0.5 INJECTION INTRATHECAL at 06:46

## 2024-09-07 RX ADMIN — ENOXAPARIN SODIUM 40 MILLIGRAM(S): 100 INJECTION SUBCUTANEOUS at 06:45

## 2024-09-07 RX ADMIN — BACLOFEN 5 MILLIGRAM(S): 0.5 INJECTION INTRATHECAL at 13:10

## 2024-09-07 RX ADMIN — Medication 200 MILLIGRAM(S): at 17:09

## 2024-09-07 RX ADMIN — Medication 1 TABLET(S): at 13:10

## 2024-09-07 RX ADMIN — POLYETHYLENE GLYCOL 3350 17 GRAM(S): 17 POWDER, FOR SOLUTION ORAL at 06:45

## 2024-09-07 RX ADMIN — TAMSULOSIN HYDROCHLORIDE 0.4 MILLIGRAM(S): 0.4 CAPSULE ORAL at 22:38

## 2024-09-07 RX ADMIN — PHENOBARBITAL 64.8 MILLIGRAM(S): 15 TABLET ORAL at 13:10

## 2024-09-07 RX ADMIN — Medication 1 TABLET(S): at 17:09

## 2024-09-07 RX ADMIN — Medication 40 MILLIGRAM(S): at 06:36

## 2024-09-07 RX ADMIN — BACLOFEN 5 MILLIGRAM(S): 0.5 INJECTION INTRATHECAL at 22:38

## 2024-09-07 RX ADMIN — POLYETHYLENE GLYCOL 3350 17 GRAM(S): 17 POWDER, FOR SOLUTION ORAL at 17:19

## 2024-09-07 RX ADMIN — Medication 1000 MILLILITER(S): at 00:49

## 2024-09-07 RX ADMIN — Medication 1 TABLET(S): at 06:36

## 2024-09-07 NOTE — SWALLOW BEDSIDE ASSESSMENT ADULT - COMMENTS
Reason for follow up: atrial arrhthymias, ectopy         Impression:   · Paroxysmal atrial fibrillation, typical atrial flutter, and SVT  ? Symptomatic with palpitations   ? Hx of AFL dating back to at least 2002   ? S/P AVNRT ablation, focal AT arising from the zelalem ablation and CTI for counter clockwise typical AFL ablation 06/2002, unknown provider  ? Established with EP 12/2018  ? S/p AF/typical AFL ablation 9/27/19, Dr. Escoto  ? Intolerant to Tikosyn- insomnia, metallic taste   ? Intolerant to Flecainide 100 mg BID- headache, lightheadedness, 50 mg BID did not control palpitations. Ultimately self discontinued Flecainide and Atenolol due to low HR, fatigue.   ? Intolerant to higher doses of Atenolol- fatigue    ? Intolerant to Metoprolol- severe fatigue, vivid dreams  ? 07/2021 OV: Established with Dr. Cardoso.  Event monitor ordered for further eval of palpitations (SR, low ectopy burden), consider flecainide resumption in future. 8/5/21: Warfarin discontinued following discussion at OV with Dr. Cardoso. 8/16/22: take evening dose of coreg at least 1 hour before bed to help with nighttime palpitations, consider ranexa with increase in palpitations.   ? 2/1/22 OV: seen back by [facility] EP. Palpitations continue but ectopy burden low. Notes improvement in symptoms with starting magnesium supplement. Offered increase in Coreg.   ? Tracings from smart watch sent to office for review 11/29/22, 1/3/23- cannot rule out AF, suspect mostly sinus with PAC's, continues to remain off anticoagulation   · CHADSVASc 5 (Age, HTN, CAD, stroke)   · Not on anticoagulation  ? Previously on Warfarin, dc'd 8/5/21 by Dr Cardoso per patient preference  · Old stroke found on head CT in May 2022 after presenting with a mechanical fall, no residual effects.   · Small PFO  ? Cardiologist: Dr. Honeycutt- not a candidate for closure currently, reassess with echo q1-2 years  · PVCs/PACs  ? Symptoms of palpitations and anxiety  · Coronary artery disease,  s/p CABG x4 9/10/21  · Cardiac Imaging   ? Echo 7/6/22: EF 60-65%, mod TR, evidence of right to left shunt  ? Cardiac cath 9/7/21: multivessel CAD  ? Stress test 6/26/20: No evidence of regadenoson induced ischemia or prior MI  ? EKG/telemetry tracings 7/15/19 (outside facility)- AFL and possible AF       ECG IN MUSE, [employer] watch IMAGES WERE INDEPENDENTLY VISUALIZED AND INTERPRETED AS NOTED ABOVE.     Recommendations:   · Reviewed tracings from smart watch with patient (sent in November) showing sinus rhythm with PAC's. Discussed accuracy of rhythm monitoring with smart watch vs ECG. Discussed AF is a chronic condition with no cure. Goal of AF treatment is symptom management and preservation of cardiac function.  · Offered 30 day cardiac monitor for symptom rhythm correlation (palpitations) and he declined.   · Follow up in 1 year with Dr. Williamson (MD per patient preference.)      HPI: Nasir Villasenor is a 73 year old male seen in clinic for follow up of paroxysmal atrial fibrillation, typical atrial flutter, and PVCs/PACs. He recently got a new watch which records his heart rhythm and this prompted him to send tracings to office to review. He has since stopped using the watch for rhythm monitoring. Symptoms affect him mentally, but not physically. Describes 2 types of palpitations, fast heart rates and skipped beats. Fast heart rates last for 5-6 seconds. Skipped beats are ongoing and occur randomly throughout the day. Reports frustration with randomness of symptoms- one day he will experience an episode of fast heart rates and then will be fine the rest of the day. The next day he is without palpitations. Then the following notices skipped beats. Noticed heart racing after receiving vaccines. He is unaccompanied in office today.         PAST MEDICAL HX:    Palpitations                                                    Comment: SVT    AV ann marie reentrant tachycardia                  11/25/2013      Comment: AV ann marie  reentrant tachycardia, atrial                tachycardia/atrial flutter (paroxysmal), status               post radiofrequency ablation     Recurrent cold sores                            3/10/2015       Comment: Lips    Essential (primary) hypertension                              Anxiety                                                       Encounter for therapeutic drug monitoring       9/4/2020      Allergies and Medications were reviewed    ROS: Above review of system completed by nursing staff and reviewed by provider. Pertinent items are noted in the history of present illness (HPI).     PHYSICAL EXAM:  Visit Vitals  BP (!) 150/70 (BP Location: LUE - Left upper extremity, Patient Position: Sitting, Cuff Size: Regular)   Pulse (!) 58   Ht 5' 9\" (1.753 m)   Wt 72.6 kg (160 lb)   SpO2 99%   BMI 23.63 kg/m²     GENERAL:  Cooperative, sitting comfortably, in no acute distress.  HEAD: Normocephalic, Non-traumatic  NECK: No masses  CARDIOVASCULAR:   Regular rate and rhythm.  RESPIRATORY:  No respiratory distress, audible wheeze, tripoding.  EXTREMITIES: No clubbing or cyanosis. No pitting edema.  NEURO:  Alert and oriented to person, place, and time. No obvious motor deficits  PSYCHIATRIC:  Mood and affect normal.   INTEGUMENTARY:  Warm, no rashes or nodules.      I have personally reviewed and interpreted EKG/rhythm strip below.  I have reviewed and summarized old records as per the impression section.      EKG: sinus bradycardia, rate 58 bpm    Labs:   Lab Results   Component Value Date    WBC 4.4 05/24/2022    WBC 4.8 09/27/2019    HGB 13.0 05/24/2022    HGB 14.4 09/27/2019    HCT 39.6 05/24/2022    HCT 43.6 09/27/2019     05/24/2022     09/27/2019     11/26/2013    INR 1.0 09/08/2021    INR 2.6 07/16/2021    POTASSIUM 4.0 06/06/2022    POTASSIUM 4.2 10/22/2019    BUN 13 06/06/2022    BUN 14 10/22/2019    CREATININE 0.90 06/06/2022    CREATININE 0.89 10/22/2019    TSH 2.800 09/08/2021    TSH  1.089 11/30/2018         On 02/02/23, IIvanna RN, scribed the services personally performed by Dr. Williamson.    During this visit I, Aislinn MALIK [employer], obtained the history, performed the exam and developed the impression and plan.   I confirmed with the patient the review of systems, past history, family history, social history and medication history that were originally obtained by the nurse/scribe.   All of this has been recorded here as a scribed note by the nurse/scribe who performed the duties of a scribe for this encounter in my presence.     pt received alert. +NC. +wet upper airway congestion audible baseline. pt known to acute service w/recs for puree w/moderately thick liquids. per health aide, present bedside, pt w/toleration of the current diet and receives meds via PEG.

## 2024-09-07 NOTE — H&P ADULT - HISTORY OF PRESENT ILLNESS
patient is a 67yo male PMH cerebral palsy, seizure disorder, bph, PEG tube, suprapubic catheter coming to ED with reduced urinary output from suprapubic catheter. pt complaining of burning/ discomfort when urine does drain. aid bedside reports x3 days pt has had minimal urine output. suprapubic has been placed for years, most recently changed last week and was found to have UTI, currently taking abx (pt unable to tell name of abx). denies fever, abdominal pain, chest pain, SOB, n,v,d,c.    68-year-old male with past medical history of cerebral palsy, seizures, BPH, suprapubic catheter, PEG, wheelchair dependent who presents due to cough, congestion, sputum production and erythema by PEG tube.      # Acute Hypoxic Respiratory Failure     Likely due to Aspiration pneumonia/ Pneumonitis.  # Possible Cystitis  #Cellulitis surrounding PEG  -WBC 11.86, UA positive for mod leukocyte esterase, erythema and tenderness by PEG site, patient with cough and rhonchi on exam - improved  - wean down O2 as tolerated.   -chest x ray with increased right sided opacities compared to prior  -CT a/p: Nondistended bladder with suprapubic catheter, thickening of the bladder wall which is similar to prior CT dated 11/21/2022 and at least in part due to nondistention. Correlate with urinalysis if there is a concern for cystitis. 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  -In ED patient was given cefepime and vanc   - no cellulits near PEG, OFF abx per ID  -procal - neg  -s/s eval done - mod thick, pills through peg tube.     #Constipation  -CT demonstrated: Dilated redundant sigmoid colon measuring up to 9 cm containing large volume stool and gas. The remaining colon is diffusely dilated as well with gas and stool. No convincing evidence of sigmoid volvulus. Small bowel is nondistended.  -c/w home miralax and lactulose  - pt has 3 BMs yday    #Seizures  -c/w home phenobarbital     #BPH  -c/w home tamsulosin     DVT proph: lovenox subq  GI proph: PPI .    labs : 7.49, Cr 0.6, UA +,  Patient is a 69yo male PMH cerebral palsy, seizure disorder, BPH, s/p PEG tube, s/p suprapubic catheter coming to ED from a group home due to his clogged suprapubic catheter. pt is asymptomatic except for complaints of burning/ discomfort when urine does drain. his suprapubic cath was exchanged in the ED and urine started draining with some purulence. He was also found to have a UTI at the group home on UA, he was started on abx not mentioned in the chart and pt is unaware too. pt denies abdominal pain, chills, fevers, cough or any other issues.     · Temp at ED Arrival (C)	37.2 Degrees C  · O2 Delivery/Oxygen Delivery Method	room air  · SpO2 (%)	94 %  · Temp site	oral  · Temp (C)	37.2 Degrees C  · Temp (F)	98.9 Degrees F  · Respiration Rate (breaths/min)	16 /min  · Heart Rate	88 /min  · BP Diastolic	62 mm Hg  · BP Systolic	121 mm Hg    later his BP was 96/59, received 1 L LR bolus and was started on rocephin    labs : 7.49, Cr 0.6, UA +.     Pt admitted for UTI.

## 2024-09-07 NOTE — SWALLOW BEDSIDE ASSESSMENT ADULT - SLP PERTINENT HISTORY OF CURRENT PROBLEM
67yo male PMH cerebral palsy, seizure disorder, BPH, s/p PEG tube, s/p suprapubic catheter coming to ED from a group home due to his clogged suprapubic catheter. pt is asymptomatic except for complaints of burning/ discomfort when urine does drain. his suprapubic cath was exchanged in the ED and urine started draining with some purulence. He was also found to have a UTI at the group home on UA, he was started on abx not mentioned in the chart and pt is unaware too. pt denies abdominal pain, chills, fevers, cough or any other issues.

## 2024-09-07 NOTE — ED ADULT NURSE NOTE - CHIEF COMPLAINT QUOTE
"I have a urinary infection and I need to be evaluated" C/p pain "during urination" - pt has suprapubic cath

## 2024-09-07 NOTE — H&P ADULT - ATTENDING COMMENTS
Patient seen and examined. Case discussed with resident team    MEDICATIONS  (STANDING):  ammonium lactate 12% Lotion 1 Application(s) Topical every 12 hours  baclofen 5 milliGRAM(s) Oral every 8 hours  bisacodyl Suppository 10 milliGRAM(s) Rectal daily  calcium carbonate   1250 mG (OsCal) 1 Tablet(s) Oral two times a day  ciprofloxacin   IVPB 400 milliGRAM(s) IV Intermittent every 12 hours  enoxaparin Injectable 40 milliGRAM(s) SubCutaneous every 24 hours  lactulose Syrup 20 Gram(s) Oral every 12 hours  mineral oil/petrolatum Hydrophilic Ointment 1 Application(s) Topical daily  multivitamin 1 Tablet(s) Oral daily  pantoprazole    Tablet 40 milliGRAM(s) Oral before breakfast  PHENobarbital 64.8 milliGRAM(s) Oral daily  polyethylene glycol 3350 17 Gram(s) Oral two times a day  tamsulosin 0.4 milliGRAM(s) Oral at bedtime    MEDICATIONS  (PRN):  acetaminophen     Tablet .. 650 milliGRAM(s) Oral every 6 hours PRN Temp greater or equal to 38C (100.4F), Mild Pain (1 - 3)  albuterol    90 MICROgram(s) HFA Inhaler 2 Puff(s) Inhalation every 6 hours PRN Bronchospasm  aluminum hydroxide/magnesium hydroxide/simethicone Suspension 30 milliLiter(s) Oral every 4 hours PRN Dyspepsia  melatonin 3 milliGRAM(s) Oral at bedtime PRN Insomnia  ondansetron Injectable 4 milliGRAM(s) IV Push every 8 hours PRN Nausea and/or Vomiting      # UTI  monitor urine culture  change to po cipro 750 BID  ID evaluated    # s/p SPC catheter exchange in ED    # constipation - per patient  he has irregular bowel movements  KUB-- showing stool retention; pending official read  contiune laxative, PEG, dulcolax supp once; monitor bowel movement  montior diet toleraance    # h/o seizure- continue phenobarb    # BPH    discussed with patient's family   Time spent 55 mnts

## 2024-09-07 NOTE — H&P ADULT - ASSESSMENT
68-year-old male with a past medical history of cerebral palsy, seizure disorder, benign prostatic hyperplasia, and s/p PEG tube and suprapubic catheter presented to the emergency department from his group home for evaluation of a clogged suprapubic catheter. His catheter was exchanged, with purulent urine noted upon drainage.  He was also found to have a urinary tract infection, for which he had been started on antibiotics at his group home. His labs were notable for a positive urinalysis.  He was admitted to the medicine service for further management of his UTI.     #Cystitis   #S/P suprapubic cath   #Previously had multiple UTIs with PCNs and last was in may with SPC   - SPC exchanged in the ED  - UCx in 5/24 showed pseudomonas with S<0.25 for cipro   - Start iv cipro   - F/U infectious work up   - ID consult     #S/P Peg tube   - C/W PEG feeds    - s/s eval done in last admission -pt can tolerate PO as well : mod thick, pills through peg tube.     #Constipation  - belly distended   - KUB in the am   - c/w home miralax and lactulose  - Pt required disimpaction on the last admission     #Seizures  #CP   - c/w baclofen  - c/w home phenobarbital     #BPH  - c/w home tamsulosin     #MISC   - Diet - PEG feeds  - DVT - lovenox subq  - GI proph: PPI . 68-year-old male with a past medical history of cerebral palsy, seizure disorder, benign prostatic hyperplasia, and s/p PEG tube and suprapubic catheter presented to the emergency department from his group home for evaluation of a clogged suprapubic catheter. His catheter was exchanged, with purulent urine noted upon drainage.  He was also found to have a urinary tract infection, for which he had been started on antibiotics at his group home. His labs were notable for a positive urinalysis.  He was admitted to the medicine service for further management of his UTI.     #Cystitis   #S/P suprapubic cath   #Previously had multiple UTIs with PCNs and last was in may with SPC   - SPC exchanged in the ED  - UCx in 5/24 showed pseudomonas with S<0.25 for cipro   - Start iv cipro   - F/U infectious work up   - ID consult     #S/P Peg tube   - C/W pureed diet, pt was on pureed diet in   - S/S eval requested again  - s/s eval done in last admission -pt can tolerate PO as well : mod thick, pills through peg tube.     #Constipation  - belly distended   - KUB in the am   - c/w home miralax and lactulose  - Pt required disimpaction on the last admission     #Seizures  #CP   - c/w baclofen  - c/w home phenobarbital     #BPH  - c/w home tamsulosin     #MISC   - Diet - Pureed  - DVT - lovenox subq  - GI proph: PPI .

## 2024-09-07 NOTE — CONSULT NOTE ADULT - ASSESSMENT
69yo male PMH cerebral palsy, seizure disorder, BPH, s/p PEG tube, s/p suprapubic catheter coming to ED from a group home due to his clogged suprapubic catheter. pt is asymptomatic except for complaints of burning/ discomfort when urine does drain. his suprapubic cath was exchanged in the ED and urine started draining with some purulence. He was also found to have a UTI at the group home on UA, he was started on abx not mentioned in the chart and pt is unaware too. pt denies abdominal pain, chills, fevers, cough or any other issues.    IMPRESSION/RECOMMENDATIONS  Immunosuppression/Immunosenescence ( above age 60 yrs there is a exponential decline in immunity which could result in poor clinical outcomes.       BPH (benign prostatic hyperplasia)  Cerebral palsy  Seizure  last seizure >10 years ago  Osteoporosis  Spastic quadriplegia  Urinary calculi  Urinary retention  Asthma  S/P percutaneous endoscopic gastrostomy (PEG) tube placement  Suprapubic catheter 67yo male PMH cerebral palsy, seizure disorder, BPH, s/p PEG tube, s/p suprapubic catheter coming to ED from a group home due to his clogged suprapubic catheter. pt is asymptomatic except for complaints of burning/ discomfort when urine does drain. his suprapubic cath was exchanged in the ED and urine started draining with some purulence. He was also found to have a UTI at the group home on UA, he was started on abx not mentioned in the chart and pt is unaware too. pt denies abdominal pain, chills, fevers, cough or any other issues.    IMPRESSION/RECOMMENDATIONS  Immunosuppression/Immunosenescence ( above age 60 yrs there is a exponential decline in immunity which could result in poor clinical outcomes.   Clinically no pyelonephritis/cystitis/sepsis  Clogged SPT and now drainage well  5/29 UCx P aeruginosa  5/26 CT bladder wall thickening  WBC 7.4  BPH (benign prostatic hyperplasia)  Cerebral palsy  Seizure  last seizure >10 years ago  Osteoporosis  Spastic quadriplegia  Urinary calculi  Urinary retention  Asthma  S/P percutaneous endoscopic gastrostomy (PEG) tube placement  Suprapubic catheter    -continue with iv Ciprofloxacin 400 mg q12h  -could change today to 5 days of po Cipro 750 mg q12h  -Off loading to prevent pressure sores and preventive measures to avoid aspiration

## 2024-09-07 NOTE — H&P ADULT - NSHPPHYSICALEXAM_GEN_ALL_CORE
Usually there is a form?   Please clarify GENERAL: NAD  HEART: Regular rate and rhythm, no murmurs, rubs, or gallops  LUNGS: Unlabored respirations.  ABDOMEN: soft, mild distension, purulent discharge near suprapubic site, PEG site is clean  EXTREMITIES: No edema   NERVOUS SYSTEM:  AAOx3, spastic.

## 2024-09-07 NOTE — ED ADULT NURSE NOTE - NSFALLUNIVINTERV_ED_ALL_ED
Bed/Stretcher in lowest position, wheels locked, appropriate side rails in place/Call bell, personal items and telephone in reach/Instruct patient to call for assistance before getting out of bed/chair/stretcher/Non-slip footwear applied when patient is off stretcher/[place] to call system/Physically safe environment - no spills, clutter or unnecessary equipment/Purposeful proactive rounding/Room/bathroom lighting operational, light cord in reach

## 2024-09-08 PROCEDURE — 99232 SBSQ HOSP IP/OBS MODERATE 35: CPT

## 2024-09-08 RX ADMIN — POLYETHYLENE GLYCOL 3350 17 GRAM(S): 17 POWDER, FOR SOLUTION ORAL at 18:40

## 2024-09-08 RX ADMIN — Medication 1 TABLET(S): at 06:15

## 2024-09-08 RX ADMIN — BACLOFEN 5 MILLIGRAM(S): 0.5 INJECTION INTRATHECAL at 14:33

## 2024-09-08 RX ADMIN — POLYETHYLENE GLYCOL 3350 17 GRAM(S): 17 POWDER, FOR SOLUTION ORAL at 06:15

## 2024-09-08 RX ADMIN — Medication 200 MILLIGRAM(S): at 06:23

## 2024-09-08 RX ADMIN — Medication 1 APPLICATION(S): at 06:15

## 2024-09-08 RX ADMIN — Medication 20 GRAM(S): at 18:46

## 2024-09-08 RX ADMIN — Medication 20 GRAM(S): at 06:17

## 2024-09-08 RX ADMIN — PHENOBARBITAL 64.8 MILLIGRAM(S): 15 TABLET ORAL at 14:34

## 2024-09-08 RX ADMIN — Medication 750 MILLIGRAM(S): at 18:40

## 2024-09-08 RX ADMIN — Medication 1 APPLICATION(S): at 18:37

## 2024-09-08 RX ADMIN — ENOXAPARIN SODIUM 40 MILLIGRAM(S): 100 INJECTION SUBCUTANEOUS at 06:16

## 2024-09-08 RX ADMIN — Medication 1 APPLICATION(S): at 18:51

## 2024-09-08 RX ADMIN — BACLOFEN 5 MILLIGRAM(S): 0.5 INJECTION INTRATHECAL at 06:16

## 2024-09-08 RX ADMIN — Medication 1 TABLET(S): at 18:37

## 2024-09-08 RX ADMIN — BACLOFEN 5 MILLIGRAM(S): 0.5 INJECTION INTRATHECAL at 21:41

## 2024-09-08 RX ADMIN — TAMSULOSIN HYDROCHLORIDE 0.4 MILLIGRAM(S): 0.4 CAPSULE ORAL at 21:41

## 2024-09-08 RX ADMIN — Medication 1 TABLET(S): at 14:33

## 2024-09-09 ENCOUNTER — TRANSCRIPTION ENCOUNTER (OUTPATIENT)
Age: 69
End: 2024-09-09

## 2024-09-09 LAB
ALBUMIN SERPL ELPH-MCNC: 3.9 G/DL — SIGNIFICANT CHANGE UP (ref 3.5–5.2)
ALP SERPL-CCNC: 86 U/L — SIGNIFICANT CHANGE UP (ref 30–115)
ALT FLD-CCNC: 8 U/L — SIGNIFICANT CHANGE UP (ref 0–41)
ANION GAP SERPL CALC-SCNC: 14 MMOL/L — SIGNIFICANT CHANGE UP (ref 7–14)
AST SERPL-CCNC: 17 U/L — SIGNIFICANT CHANGE UP (ref 0–41)
BASOPHILS # BLD AUTO: 0.03 K/UL — SIGNIFICANT CHANGE UP (ref 0–0.2)
BASOPHILS NFR BLD AUTO: 0.5 % — SIGNIFICANT CHANGE UP (ref 0–1)
BILIRUB SERPL-MCNC: 0.4 MG/DL — SIGNIFICANT CHANGE UP (ref 0.2–1.2)
BUN SERPL-MCNC: 14 MG/DL — SIGNIFICANT CHANGE UP (ref 10–20)
CALCIUM SERPL-MCNC: 9 MG/DL — SIGNIFICANT CHANGE UP (ref 8.4–10.5)
CHLORIDE SERPL-SCNC: 101 MMOL/L — SIGNIFICANT CHANGE UP (ref 98–110)
CO2 SERPL-SCNC: 23 MMOL/L — SIGNIFICANT CHANGE UP (ref 17–32)
CREAT SERPL-MCNC: 0.6 MG/DL — LOW (ref 0.7–1.5)
EGFR: 105 ML/MIN/1.73M2 — SIGNIFICANT CHANGE UP
EOSINOPHIL # BLD AUTO: 0.37 K/UL — SIGNIFICANT CHANGE UP (ref 0–0.7)
EOSINOPHIL NFR BLD AUTO: 6 % — SIGNIFICANT CHANGE UP (ref 0–8)
GLUCOSE SERPL-MCNC: 92 MG/DL — SIGNIFICANT CHANGE UP (ref 70–99)
HCT VFR BLD CALC: 39.4 % — LOW (ref 42–52)
HGB BLD-MCNC: 13.4 G/DL — LOW (ref 14–18)
IMM GRANULOCYTES NFR BLD AUTO: 0.3 % — SIGNIFICANT CHANGE UP (ref 0.1–0.3)
LYMPHOCYTES # BLD AUTO: 1.53 K/UL — SIGNIFICANT CHANGE UP (ref 1.2–3.4)
LYMPHOCYTES # BLD AUTO: 24.8 % — SIGNIFICANT CHANGE UP (ref 20.5–51.1)
MAGNESIUM SERPL-MCNC: 2.1 MG/DL — SIGNIFICANT CHANGE UP (ref 1.8–2.4)
MCHC RBC-ENTMCNC: 30.7 PG — SIGNIFICANT CHANGE UP (ref 27–31)
MCHC RBC-ENTMCNC: 34 G/DL — SIGNIFICANT CHANGE UP (ref 32–37)
MCV RBC AUTO: 90.2 FL — SIGNIFICANT CHANGE UP (ref 80–94)
MONOCYTES # BLD AUTO: 0.63 K/UL — HIGH (ref 0.1–0.6)
MONOCYTES NFR BLD AUTO: 10.2 % — HIGH (ref 1.7–9.3)
NEUTROPHILS # BLD AUTO: 3.58 K/UL — SIGNIFICANT CHANGE UP (ref 1.4–6.5)
NEUTROPHILS NFR BLD AUTO: 58.2 % — SIGNIFICANT CHANGE UP (ref 42.2–75.2)
NRBC # BLD: 0 /100 WBCS — SIGNIFICANT CHANGE UP (ref 0–0)
PLATELET # BLD AUTO: 236 K/UL — SIGNIFICANT CHANGE UP (ref 130–400)
PMV BLD: 10.5 FL — HIGH (ref 7.4–10.4)
POTASSIUM SERPL-MCNC: 4.6 MMOL/L — SIGNIFICANT CHANGE UP (ref 3.5–5)
POTASSIUM SERPL-SCNC: 4.6 MMOL/L — SIGNIFICANT CHANGE UP (ref 3.5–5)
PROT SERPL-MCNC: 6.9 G/DL — SIGNIFICANT CHANGE UP (ref 6–8)
RBC # BLD: 4.37 M/UL — LOW (ref 4.7–6.1)
RBC # FLD: 13.5 % — SIGNIFICANT CHANGE UP (ref 11.5–14.5)
SODIUM SERPL-SCNC: 138 MMOL/L — SIGNIFICANT CHANGE UP (ref 135–146)
WBC # BLD: 6.16 K/UL — SIGNIFICANT CHANGE UP (ref 4.8–10.8)
WBC # FLD AUTO: 6.16 K/UL — SIGNIFICANT CHANGE UP (ref 4.8–10.8)

## 2024-09-09 PROCEDURE — 76857 US EXAM PELVIC LIMITED: CPT | Mod: 26

## 2024-09-09 PROCEDURE — 51705 CHANGE OF BLADDER TUBE: CPT

## 2024-09-09 PROCEDURE — 99232 SBSQ HOSP IP/OBS MODERATE 35: CPT | Mod: GC

## 2024-09-09 RX ADMIN — Medication 750 MILLIGRAM(S): at 17:55

## 2024-09-09 RX ADMIN — Medication 1 TABLET(S): at 17:55

## 2024-09-09 RX ADMIN — Medication 20 GRAM(S): at 06:43

## 2024-09-09 RX ADMIN — POLYETHYLENE GLYCOL 3350 17 GRAM(S): 17 POWDER, FOR SOLUTION ORAL at 06:44

## 2024-09-09 RX ADMIN — Medication 1 APPLICATION(S): at 17:55

## 2024-09-09 RX ADMIN — Medication 1 TABLET(S): at 06:42

## 2024-09-09 RX ADMIN — Medication 1 APPLICATION(S): at 06:44

## 2024-09-09 RX ADMIN — BACLOFEN 5 MILLIGRAM(S): 0.5 INJECTION INTRATHECAL at 14:28

## 2024-09-09 RX ADMIN — BACLOFEN 5 MILLIGRAM(S): 0.5 INJECTION INTRATHECAL at 06:43

## 2024-09-09 RX ADMIN — PHENOBARBITAL 64.8 MILLIGRAM(S): 15 TABLET ORAL at 11:48

## 2024-09-09 RX ADMIN — Medication 1 APPLICATION(S): at 11:48

## 2024-09-09 RX ADMIN — ENOXAPARIN SODIUM 40 MILLIGRAM(S): 100 INJECTION SUBCUTANEOUS at 06:43

## 2024-09-09 RX ADMIN — Medication 1 TABLET(S): at 11:48

## 2024-09-09 RX ADMIN — Medication 10 MILLIGRAM(S): at 11:48

## 2024-09-09 RX ADMIN — TAMSULOSIN HYDROCHLORIDE 0.4 MILLIGRAM(S): 0.4 CAPSULE ORAL at 21:07

## 2024-09-09 RX ADMIN — Medication 750 MILLIGRAM(S): at 06:42

## 2024-09-09 RX ADMIN — BACLOFEN 5 MILLIGRAM(S): 0.5 INJECTION INTRATHECAL at 21:06

## 2024-09-09 NOTE — PROGRESS NOTE ADULT - ASSESSMENT
68-year-old male with a past medical history of cerebral palsy, seizure disorder, benign prostatic hyperplasia, and s/p PEG tube and suprapubic catheter presented to the emergency department from his group home for evaluation of a clogged suprapubic catheter. His catheter was exchanged, with purulent urine noted upon drainage.  He was also found to have a urinary tract infection    # UTI  monitor urine culture  change to po cipro 750 BID for 5 days  ID evaluated    # s/p SPC catheter exchange in ED    # constipation - per patient  he has irregular bowel movements  KUB-- showing stool retention; pending official read  contiune laxative, PEG, lactulose   monitor bowel movement    # h/o seizure- continue phenobarb    # BPH      DVT px- lovenox  prepare for discharge plan to group home tomorrow  Time spent 35 mnts
68-year-old male with a past medical history of cerebral palsy, seizure disorder, benign prostatic hyperplasia, and s/p PEG tube and suprapubic catheter presented to the emergency department from his group home for evaluation of a clogged suprapubic catheter. His catheter was exchanged, with purulent urine noted upon drainage.  He was also found to have a urinary tract infection    # leaking SPT, bladder spasms  D/w urology- SPC catheter was exchanged on 9/9  Bladder US to eval position of SPT balloon  Monitor UO    # UTI  # s/p SPC catheter exchange in ED  monitor urine culture  change to po cipro 750 BID for 5 days  ID evaluated    # constipation  KUB- small to moderate stool burden  dulcolax supp daily  continue lactulose, PEG   monitor bowel movement    # h/o seizure- continue phenobarb    # BPH    DVT px- lovenox  prepare for discharge plan to group home tomorrow  Discussed wtih niece    Time spent 35 mnts  
· Assessment	  67yo male PMH cerebral palsy, seizure disorder, BPH, s/p PEG tube, s/p suprapubic catheter coming to ED from a group home due to his clogged suprapubic catheter. pt is asymptomatic except for complaints of burning/ discomfort when urine does drain. his suprapubic cath was exchanged in the ED and urine started draining with some purulence. He was also found to have a UTI at the group home on UA, he was started on abx not mentioned in the chart and pt is unaware too. pt denies abdominal pain, chills, fevers, cough or any other issues.    IMPRESSION/RECOMMENDATIONS  Immunosuppression/Immunosenescence ( above age 60 yrs there is a exponential decline in immunity which could result in poor clinical outcomes.   Clinically no pyelonephritis/cystitis/sepsis  Clogged SPT and now drainage well  5/29 UCx P aeruginosa  5/26 CT bladder wall thickening  WBC 7.4  BPH (benign prostatic hyperplasia)  Cerebral palsy  Seizure  last seizure >10 years ago  Osteoporosis  Spastic quadriplegia  Urinary calculi  Urinary retention  Asthma  S/P percutaneous endoscopic gastrostomy (PEG) tube placement  Suprapubic catheter    -5 days of po Cipro 750 mg q12h  -Off loading to prevent pressure sores and preventive measures to avoid aspiration

## 2024-09-09 NOTE — DISCHARGE NOTE PROVIDER - HOSPITAL COURSE
68-year-old male with a past medical history of cerebral palsy, seizure disorder, benign prostatic hyperplasia, and s/p PEG tube and suprapubic catheter presented to the emergency department from his group home for evaluation of a clogged suprapubic catheter. His catheter was exchanged, with purulent urine noted upon drainage which was suspicious for urinary tract infection.     Patient was treated with ciprofloxacin. KUB showed stool retention but patient had a bowel movement inpatient with improvement of abdominal distention. Patient is medically stable for discharge.    Discussion of discharge plan of care, including discharge diagnoses, medication reconciliation, and follow-ups was conducted with Dr. Augustin on 9/9/24 at 10 AM, and discharge was approved.     # s/p SPC catheter exchange in ED  # UTI  - UA positive. UCx positive for gram negative rods  - per ID, po cipro 750 BID for 5 days. Finish on 9/12/24    # constipation - resolved  - per patient  he has irregular bowel movements  - KUB shows stool retention  - c/w laxative, PEG, lactulose     # h/o seizure  - continue phenobarb    # BPH  - c/w flomax

## 2024-09-09 NOTE — DISCHARGE NOTE PROVIDER - NSDCFUSCHEDAPPT_GEN_ALL_CORE_FT
[facility] Physician [facility]  UROLOGY [address] [place] [address]  Scheduled Appointment: 09/10/2024

## 2024-09-09 NOTE — DISCHARGE NOTE PROVIDER - CARE PROVIDER_API CALL
Rupesh Hernandez  Geriatric Medicine  [address] [place] [address]  Phone: [phone]  Fax: [phone]  Follow Up Time:

## 2024-09-09 NOTE — CONSULT NOTE ADULT - SUBJECTIVE AND OBJECTIVE BOX
69 yo M Past PMHx of Cerebral Palsy, Seizure disorder, spastic paraplegia, BPH, Hx of Nephrolithiasis s/p PCNLs, hx of incompetent bladder neck with chronic SPT   p/w clogged SPT that was exchanged in ED, admitted to medicine with UTI. Urology called today for leaking SPT.   Pt seen & examined at bedside in NAD, c/o constipation and leaking around SPT catheter and from penile shaft noted, SPT draining minimal UO with debris in catheter. Attempted to irrigate SPT, not able to withdraw irrigant back, In sterile technique SPT exchanged to 22 Fr, 20 cc balloon inflated, attempted to irrigate + leakage around SPT, not able to withdraw irrigant back. D/w medical attending will obtain bladder US to check position of cath.       PAST MEDICAL & SURGICAL HISTORY:  BPH (benign prostatic hyperplasia)      Cerebral palsy      Seizure  last seizure >10 years ago      Osteoporosis      Spastic quadriplegia      Urinary calculi      Urinary retention      Asthma      S/P percutaneous endoscopic gastrostomy (PEG) tube placement      H/O cystoscopy      Suprapubic catheter          MEDICATIONS  (STANDING):  ammonium lactate 12% Lotion 1 Application(s) Topical every 12 hours  baclofen 5 milliGRAM(s) Oral every 8 hours  bisacodyl Suppository 10 milliGRAM(s) Rectal daily  calcium carbonate   1250 mG (OsCal) 1 Tablet(s) Oral two times a day  ciprofloxacin     Tablet 750 milliGRAM(s) Oral two times a day  enoxaparin Injectable 40 milliGRAM(s) SubCutaneous every 24 hours  lactulose Syrup 20 Gram(s) Oral every 12 hours  mineral oil/petrolatum Hydrophilic Ointment 1 Application(s) Topical daily  multivitamin 1 Tablet(s) Oral daily  pantoprazole    Tablet 40 milliGRAM(s) Oral before breakfast  PHENobarbital 64.8 milliGRAM(s) Oral daily  polyethylene glycol 3350 17 Gram(s) Oral two times a day  tamsulosin 0.4 milliGRAM(s) Oral at bedtime    MEDICATIONS  (PRN):  acetaminophen     Tablet .. 650 milliGRAM(s) Oral every 6 hours PRN Temp greater or equal to 38C (100.4F), Mild Pain (1 - 3)  albuterol    90 MICROgram(s) HFA Inhaler 2 Puff(s) Inhalation every 6 hours PRN Bronchospasm  aluminum hydroxide/magnesium hydroxide/simethicone Suspension 30 milliLiter(s) Oral every 4 hours PRN Dyspepsia  melatonin 3 milliGRAM(s) Oral at bedtime PRN Insomnia  ondansetron Injectable 4 milliGRAM(s) IV Push every 8 hours PRN Nausea and/or Vomiting      Allergies: NKDA     SOCIAL HISTORY: No illicit drug use      REVIEW OF SYSTEMS   [x] A ten-point review of systems was otherwise negative except as noted.      Vital Signs Last 24 Hrs  T(C): 37 (09 Sep 2024 07:00), Max: 37 (09 Sep 2024 07:00)  T(F): 98.6 (09 Sep 2024 07:00), Max: 98.6 (09 Sep 2024 07:00)  HR: 88 (09 Sep 2024 07:00) (85 - 92)  BP: 121/77 (09 Sep 2024 07:00) (108/55 - 121/77)  BP(mean): 77 (09 Sep 2024 07:00) (77 - 77)  RR: 18 (09 Sep 2024 07:00) (18 - 18)  SpO2: 94% (09 Sep 2024 07:00) (94% - 95%)    Parameters below as of 09 Sep 2024 07:00  Patient On (Oxygen Delivery Method): room air        PHYSICAL EXAM:  GEN: NAD, awake and alert.  SKIN: Good color, non diaphoretic.  RESP: Non-labored breathing. No use of accessory muscles.  ABDO: Firm, distended, + SPT in place draining minimal urine with sediment. + PEG tube in place.   BACK: No CVAT B/L  : Non circumcised male. + Leaking urine    EXT: B/L LE contracted.         LABS:                        13.4   6.16  )-----------( 236      ( 09 Sep 2024 06:15 )             39.4     09-09    138  |  101  |  14  ----------------------------<  92  4.6   |  23  |  0.6<L>    Ca    9.0      09 Sep 2024 06:15  Mg     2.1     09-09    TPro  6.9  /  Alb  3.9  /  TBili  0.4  /  DBili  x   /  AST  17  /  ALT  8   /  AlkPhos  86  09-09       Urinalysis with Rflx Culture (09.06.24 @ 23:56)   Urine Appearance: Cloudy   Color: Yellow   Specific Gravity: 1.014   pH Urine: 7.5   Protein, Urine: 300 mg/dL   Glucose Qualitative, Urine: Negative mg/dL   Ketone - Urine: Negative mg/dL   Blood, Urine: Large   Bilirubin: Negative   Urobilinogen: 0.2 mg/dL   Leukocyte Esterase Concentration: Large   Nitrite: Positive    Urine Microscopic-Add On (NC) (09.06.24 @ 23:56)    Epithelial Cells: 0 /HPF   Cast: 11 /LPF   Bacteria: Many /HPF   Red Blood Cell - Urine: 158 /HPF   White Blood Cell - Urine: 755 /HPF      Culture - Urine (09.06.24 @ 23:56)    Specimen Source: Clean Catch None   Culture Results:   >100,000 CFU/ml Gram Negative Rods      RADIOLOGY & ADDITIONAL STUDIES:  < from: Xray Kidney Ureter Bladder (09.07.24 @ 09:57) >    ACC: [id number] EXAM:  XR KUB 1 VIEW   ORDERED BY: DOROTA DEAN     PROCEDURE DATE:  09/07/2024          INTERPRETATION:  Abdominal distention  Single image  Previous exam 5/26/2024  There is mild gaseous distention of the colon with normal caliber small   bowel. No free air is seen.  There is a small to moderate stool burden.  There is an old fracture deformity of the left hip.    IMPRESSION: Mild colonic distention. Small to moderate stool burden.    --- End of Report ---      YAN CARRERA MD; Attending Radiologist  This document has been electronically signed. Sep  7 2024  3:09PM    < end of copied text >      
  HAWA TISH  68y, Male  Allergy: No Known Allergies      All historical available data reviewed.    HPI:  Patient is a 69yo male PMH cerebral palsy, seizure disorder, BPH, s/p PEG tube, s/p suprapubic catheter coming to ED from a group home due to his clogged suprapubic catheter. pt is asymptomatic except for complaints of burning/ discomfort when urine does drain. his suprapubic cath was exchanged in the ED and urine started draining with some purulence. He was also found to have a UTI at the group home on UA, he was started on abx not mentioned in the chart and pt is unaware too. pt denies abdominal pain, chills, fevers, cough or any other issues.     · Temp at ED Arrival (C)	37.2 Degrees C  · O2 Delivery/Oxygen Delivery Method	room air  · SpO2 (%)	94 %  · Temp site	oral  · Temp (C)	37.2 Degrees C  · Temp (F)	98.9 Degrees F  · Respiration Rate (breaths/min)	16 /min  · Heart Rate	88 /min  · BP Diastolic	62 mm Hg  · BP Systolic	121 mm Hg    later his BP was 96/59, received 1 L LR bolus and was started on rocephin    labs : 7.49, Cr 0.6, UA +.     Pt admitted for UTI.  (07 Sep 2024 01:19)    FAMILY HISTORY:    PAST MEDICAL & SURGICAL HISTORY:  BPH (benign prostatic hyperplasia)      Cerebral palsy      Seizure  last seizure >10 years ago      Osteoporosis      Spastic quadriplegia      Urinary calculi      Urinary retention      Asthma      S/P percutaneous endoscopic gastrostomy (PEG) tube placement      H/O cystoscopy      Suprapubic catheter            VITALS:  T(F): 98.5, Max: 98.9 (24 @ 17:48)  HR: 93  BP: 96/59  RR: 17Vital Signs Last 24 Hrs  T(C): 36.9 (07 Sep 2024 00:22), Max: 37.2 (06 Sep 2024 17:48)  T(F): 98.5 (07 Sep 2024 00:22), Max: 98.9 (06 Sep 2024 17:48)  HR: 93 (07 Sep 2024 00:22) (88 - 93)  BP: 96/59 (07 Sep 2024 00:22) (96/59 - 121/62)  BP(mean): --  RR: 17 (07 Sep 2024 00:22) (16 - 17)  SpO2: 94% (07 Sep 2024 00:22) (94% - 94%)    Parameters below as of 07 Sep 2024 00:22  Patient On (Oxygen Delivery Method): room air        TESTS & MEASUREMENTS:                        13.6   7.49  )-----------( 230      ( 06 Sep 2024 21:18 )             40.2         136  |  99  |  10  ----------------------------<  97  4.9   |  24  |  0.6<L>    Ca    9.1      06 Sep 2024 21:18    TPro  7.2  /  Alb  4.1  /  TBili  <0.2  /  DBili  x   /  AST  21  /  ALT  10  /  AlkPhos  94      LIVER FUNCTIONS - ( 06 Sep 2024 21:18 )  Alb: 4.1 g/dL / Pro: 7.2 g/dL / ALK PHOS: 94 U/L / ALT: 10 U/L / AST: 21 U/L / GGT: x             Urinalysis with Rflx Culture (collected 24 @ 23:56)      Urinalysis Basic - ( 06 Sep 2024 23:56 )    Color: Yellow / Appearance: Cloudy / S.014 / pH: x  Gluc: x / Ketone: Negative mg/dL  / Bili: Negative / Urobili: 0.2 mg/dL   Blood: x / Protein: 300 mg/dL / Nitrite: Positive   Leuk Esterase: Large / RBC: 158 /HPF /  /HPF   Sq Epi: x / Non Sq Epi: 0 /HPF / Bacteria: Many /HPF          RADIOLOGY & ADDITIONAL TESTS:  Personal review of radiological diagnostics performed  Echo and EKG results noted when applicable.     MEDICATIONS:  acetaminophen     Tablet .. 650 milliGRAM(s) Oral every 6 hours PRN  albuterol    90 MICROgram(s) HFA Inhaler 2 Puff(s) Inhalation every 6 hours PRN  aluminum hydroxide/magnesium hydroxide/simethicone Suspension 30 milliLiter(s) Oral every 4 hours PRN  ammonium lactate 12% Lotion 1 Application(s) Topical every 12 hours  baclofen 5 milliGRAM(s) Oral every 8 hours  calcium carbonate   1250 mG (OsCal) 1 Tablet(s) Oral two times a day  ciprofloxacin   IVPB 400 milliGRAM(s) IV Intermittent every 12 hours  enoxaparin Injectable 40 milliGRAM(s) SubCutaneous every 24 hours  lactulose Syrup 20 Gram(s) Oral every 12 hours  melatonin 3 milliGRAM(s) Oral at bedtime PRN  mineral oil/petrolatum Hydrophilic Ointment 1 Application(s) Topical daily  multivitamin 1 Tablet(s) Oral daily  ondansetron Injectable 4 milliGRAM(s) IV Push every 8 hours PRN  pantoprazole    Tablet 40 milliGRAM(s) Oral before breakfast  PHENobarbital 64.8 milliGRAM(s) Oral daily  polyethylene glycol 3350 17 Gram(s) Oral two times a day  tamsulosin 0.4 milliGRAM(s) Oral at bedtime      ANTIBIOTICS:  ciprofloxacin   IVPB 400 milliGRAM(s) IV Intermittent every 12 hours

## 2024-09-09 NOTE — DISCHARGE NOTE PROVIDER - NSDCMRMEDTOKEN_GEN_ALL_CORE_FT
Albuterol (Eqv-ProAir HFA) 90 mcg/inh inhalation aerosol: 2 puff(s) inhaled every 6 hours  ammonium lactate topical lotion: Apply topically to affected area 2 times a day to feet  baclofen 10 mg oral tablet: 1 tab(s) orally 4 times a day  DermaPhor topical ointment: Apply topically to affected area once a day  docusate sodium 50 mg/5 mL oral solution: 30 milliliter(s) by gastrostomy tube once a day  guaifenesin-dextromethorphan 100 mg-10 mg/5 mL oral liquid: 10 milliliter(s) orally 3 times a day, As needed, Cough  lactulose 10 g/15 mL oral solution: 15 milliliter(s) by gastrostomy tube once a day at 8pm  miconazole 2% topical powder: Apply topically to affected area 2 times a day  Multiple Vitamins oral liquid: 1 teaspoon via g tube daily  omeprazole 40 mg oral delayed release capsule: 1 cap(s) orally once a day  Oyster Shell 500 (1250 mg calcium carbonate) oral tablet: 1 tab(s) orally 2 times a day  PHENobarbital 64.8 mg oral tablet: 1 tab(s) orally once a day via G tube  polyethylene glycol 3350 oral powder for reconstitution: orally 2 times a day  Refresh ophthalmic solution: 1 drop(s) to each affected eye 3 times a day  tamsulosin 0.4 mg oral capsule: 1 cap(s) orally once a day (at bedtime)   Albuterol (Eqv-ProAir HFA) 90 mcg/inh inhalation aerosol: 2 puff(s) inhaled every 6 hours  ammonium lactate topical lotion: Apply topically to affected area 2 times a day to feet  baclofen 10 mg oral tablet: 1 tab(s) orally 4 times a day  ciprofloxacin 750 mg oral tablet: 1 tab(s) orally 2 times a day Last dose on 9/12  DermaPhor topical ointment: Apply topically to affected area once a day  docusate sodium 50 mg/5 mL oral solution: 30 milliliter(s) by gastrostomy tube once a day  guaifenesin-dextromethorphan 100 mg-10 mg/5 mL oral liquid: 10 milliliter(s) orally 3 times a day, As needed, Cough  lactulose 10 g/15 mL oral solution: 15 milliliter(s) by gastrostomy tube once a day at 8pm  miconazole 2% topical powder: Apply topically to affected area 2 times a day  Multiple Vitamins oral liquid: 1 unspecified orally once a day 1 teaspoon via g tube daily  omeprazole 40 mg oral delayed release capsule: 1 cap(s) orally once a day  Oyster Shell 500 (1250 mg calcium carbonate) oral tablet: 1 tab(s) orally 2 times a day  PHENobarbital 64.8 mg oral tablet: 1 tab(s) orally once a day via G tube  polyethylene glycol 3350 oral powder for reconstitution: orally 2 times a day  Refresh ophthalmic solution: 1 drop(s) to each affected eye 3 times a day  tamsulosin 0.4 mg oral capsule: 1 cap(s) orally once a day (at bedtime)

## 2024-09-09 NOTE — CONSULT NOTE ADULT - ASSESSMENT
67 yo M Past PMHx of Cerebral Palsy, Seizure disorder, spastic paraplegia, BPH, Hx of Nephrolithiasis s/p PCNLs, hx of incompetent bladder neck with chronic SPT   p/w clogged SPT that was exchanged in ED, admitted to medicine with UTI. Urology called today for leaking SPT.   Pt seen & examined at bedside in NAD, c/o constipation and leaking around SPT catheter and from penile shaft noted, SPT draining minimal UO with debris in catheter. Attempted to irrigate SPT, not able to withdraw irrigant back, In sterile technique SPT exchanged to 22 Fr, 20 cc balloon inflated, attempted to irrigate + leakage around SPT, not able to withdraw irrigant back. D/w medical attending will obtain bladder US to check position of cath.     A: leaking SPT, bladder spasms, constipation    In sterile technique SPT exchanged to 22 Fr, 20 cc balloon inflated, attempted to irrigate + leakage around SPT, not able to withdraw irrigant back.     P:   Bladder US to eval position of SPT balloon  Monitor UO  Continue abx as per Urine C&S  Address constipation   Will d/w urology attending

## 2024-09-09 NOTE — PROGRESS NOTE ADULT - SUBJECTIVE AND OBJECTIVE BOX
HPI  Patient is a 68y old Male who presents with a chief complaint of UTI (08 Sep 2024 08:57)    Currently admitted to medicine with the primary diagnosis of Urinary retention       Today is hospital day 1d.     INTERVAL HPI / OVERNIGHT EVENTS:  Patient was seen and examined at bedside    Patient Feels better  No new complaints  tolerating diet  asking when he can go back o his place        PAST MEDICAL & SURGICAL HISTORY  BPH (benign prostatic hyperplasia)    Cerebral palsy    Seizure  last seizure >10 years ago    Osteoporosis    Spastic quadriplegia    Urinary calculi    Urinary retention    Asthma    S/P percutaneous endoscopic gastrostomy (PEG) tube placement    H/O cystoscopy    Suprapubic catheter      ALLERGIES  No Known Allergies    MEDICATIONS  STANDING MEDICATIONS  ammonium lactate 12% Lotion 1 Application(s) Topical every 12 hours  baclofen 5 milliGRAM(s) Oral every 8 hours  bisacodyl Suppository 10 milliGRAM(s) Rectal daily  calcium carbonate   1250 mG (OsCal) 1 Tablet(s) Oral two times a day  ciprofloxacin     Tablet 750 milliGRAM(s) Oral two times a day  enoxaparin Injectable 40 milliGRAM(s) SubCutaneous every 24 hours  lactulose Syrup 20 Gram(s) Oral every 12 hours  mineral oil/petrolatum Hydrophilic Ointment 1 Application(s) Topical daily  multivitamin 1 Tablet(s) Oral daily  pantoprazole    Tablet 40 milliGRAM(s) Oral before breakfast  PHENobarbital 64.8 milliGRAM(s) Oral daily  polyethylene glycol 3350 17 Gram(s) Oral two times a day  tamsulosin 0.4 milliGRAM(s) Oral at bedtime    PRN MEDICATIONS  acetaminophen     Tablet .. 650 milliGRAM(s) Oral every 6 hours PRN  albuterol    90 MICROgram(s) HFA Inhaler 2 Puff(s) Inhalation every 6 hours PRN  aluminum hydroxide/magnesium hydroxide/simethicone Suspension 30 milliLiter(s) Oral every 4 hours PRN  melatonin 3 milliGRAM(s) Oral at bedtime PRN  ondansetron Injectable 4 milliGRAM(s) IV Push every 8 hours PRN    VITALS:  T(F): 97.9  HR: 81  BP: 114/60  RR: 19  SpO2: 95%    PHYSICAL EXAM  GEN: no distress, comfortable  PULM: BS heard b/l equal, No wheezing  CVS: S1S2 present, no rubs or gallops  ABD: PEG and SPC catheter present, Soft, non-distended  EXT: No lower extremity edema  NEURO: Awake and alert    LABS                        12.6   7.26  )-----------( 224      ( 07 Sep 2024 11:28 )             37.6     09-07    140  |  102  |  11  ----------------------------<  107<H>  3.8   |  27  |  0.5<L>    Ca    8.8      07 Sep 2024 11:28    TPro  6.3  /  Alb  3.8  /  TBili  <0.2  /  DBili  x   /  AST  14  /  ALT  8   /  AlkPhos  88  09-07      Urinalysis Basic - ( 07 Sep 2024 11:28 )    Color: x / Appearance: x / SG: x / pH: x  Gluc: 107 mg/dL / Ketone: x  / Bili: x / Urobili: x   Blood: x / Protein: x / Nitrite: x   Leuk Esterase: x / RBC: x / WBC x   Sq Epi: x / Non Sq Epi: x / Bacteria: x            Urinalysis with Rflx Culture (collected 06 Sep 2024 23:56)    Culture - Urine (collected 06 Sep 2024 23:56)  Source: Clean Catch None  Preliminary Report (08 Sep 2024 11:56):    >100,000 CFU/ml Gram Negative Rods          RADIOLOGY    
HPI  Patient is a 68y old Male who presents with a chief complaint of UTI (09 Sep 2024 11:13)    Currently admitted to medicine with the primary diagnosis of Urinary retention       Today is hospital day 2d.     INTERVAL HPI / OVERNIGHT EVENTS:  Patient was seen and examined at bedside  No new complaints  Per nursing- noticed leakage around SPC catheter      PAST MEDICAL & SURGICAL HISTORY  BPH (benign prostatic hyperplasia)    Cerebral palsy    Seizure  last seizure >10 years ago    Osteoporosis    Spastic quadriplegia    Urinary calculi    Urinary retention    Asthma    S/P percutaneous endoscopic gastrostomy (PEG) tube placement    H/O cystoscopy    Suprapubic catheter      ALLERGIES  No Known Allergies    MEDICATIONS  STANDING MEDICATIONS  ammonium lactate 12% Lotion 1 Application(s) Topical every 12 hours  baclofen 5 milliGRAM(s) Oral every 8 hours  bisacodyl Suppository 10 milliGRAM(s) Rectal daily  calcium carbonate   1250 mG (OsCal) 1 Tablet(s) Oral two times a day  ciprofloxacin     Tablet 750 milliGRAM(s) Oral two times a day  enoxaparin Injectable 40 milliGRAM(s) SubCutaneous every 24 hours  lactulose Syrup 20 Gram(s) Oral every 12 hours  mineral oil/petrolatum Hydrophilic Ointment 1 Application(s) Topical daily  multivitamin 1 Tablet(s) Oral daily  pantoprazole    Tablet 40 milliGRAM(s) Oral before breakfast  PHENobarbital 64.8 milliGRAM(s) Oral daily  polyethylene glycol 3350 17 Gram(s) Oral two times a day  tamsulosin 0.4 milliGRAM(s) Oral at bedtime    PRN MEDICATIONS  acetaminophen     Tablet .. 650 milliGRAM(s) Oral every 6 hours PRN  albuterol    90 MICROgram(s) HFA Inhaler 2 Puff(s) Inhalation every 6 hours PRN  aluminum hydroxide/magnesium hydroxide/simethicone Suspension 30 milliLiter(s) Oral every 4 hours PRN  melatonin 3 milliGRAM(s) Oral at bedtime PRN  ondansetron Injectable 4 milliGRAM(s) IV Push every 8 hours PRN    VITALS:  T(F): 98.6  HR: 88  BP: 121/77  RR: 18  SpO2: 94%    PHYSICAL EXAM  GEN: no distress, comfortable  PULM: BS heard b/l equal, No wheezing  CVS: S1S2 present, no rubs or gallops  ABD: SPC catheter and PEg present; distended, Soft,, no guarding; non-tender  EXT: No lower extremity edema  NEURO: Awake and aelrt    LABS                        13.4   6.16  )-----------( 236      ( 09 Sep 2024 06:15 )             39.4     09-09    138  |  101  |  14  ----------------------------<  92  4.6   |  23  |  0.6<L>    Ca    9.0      09 Sep 2024 06:15  Mg     2.1     09-09    TPro  6.9  /  Alb  3.9  /  TBili  0.4  /  DBili  x   /  AST  17  /  ALT  8   /  AlkPhos  86  09-09      Urinalysis Basic - ( 09 Sep 2024 06:15 )    Color: x / Appearance: x / SG: x / pH: x  Gluc: 92 mg/dL / Ketone: x  / Bili: x / Urobili: x   Blood: x / Protein: x / Nitrite: x   Leuk Esterase: x / RBC: x / WBC x   Sq Epi: x / Non Sq Epi: x / Bacteria: x            Urinalysis with Rflx Culture (collected 06 Sep 2024 23:56)    Culture - Urine (collected 06 Sep 2024 23:56)  Source: Clean Catch None  Preliminary Report (09 Sep 2024 12:53):    >100,000 CFU/ml Pseudomonas aeruginosa          RADIOLOGY    
  HAWATISH SCHUSTER  68y, Male    All available historical data reviewed    OVERNIGHT EVENTS:  feels well and has no new complaints  No fevers     ROS:  General: Denies rigors, nightsweats  HEENT: Denies headache, rhinorrhea, sore throat, eye pain  CV: Denies CP, palpitations  PULM: Denies wheezing, hemoptysis  GI: Denies hematemesis, hematochezia, melena  : Denies discharge, hematuria  MSK: Denies arthralgias, myalgias  SKIN: Denies rash, lesions  NEURO: weakness  PSYCH: Denies depression, anxiety    VITALS:  T(F): 97.9, Max: 99.8 (09-08-24 @ 00:30)  HR: 81  BP: 114/60  RR: 19Vital Signs Last 24 Hrs  T(C): 36.6 (08 Sep 2024 07:00), Max: 37.7 (08 Sep 2024 00:30)  T(F): 97.9 (08 Sep 2024 07:00), Max: 99.8 (08 Sep 2024 00:30)  HR: 81 (08 Sep 2024 07:00) (78 - 89)  BP: 114/60 (08 Sep 2024 07:00) (109/68 - 124/70)  BP(mean): --  RR: 19 (08 Sep 2024 06:13) (18 - 19)  SpO2: 95% (08 Sep 2024 06:13) (94% - 96%)    Parameters below as of 08 Sep 2024 06:13  Patient On (Oxygen Delivery Method): room air        TESTS & MEASUREMENTS:                        12.6   7.26  )-----------( 224      ( 07 Sep 2024 11:28 )             37.6     09-07    140  |  102  |  11  ----------------------------<  107<H>  3.8   |  27  |  0.5<L>    Ca    8.8      07 Sep 2024 11:28    TPro  6.3  /  Alb  3.8  /  TBili  <0.2  /  DBili  x   /  AST  14  /  ALT  8   /  AlkPhos  88  09-07    LIVER FUNCTIONS - ( 07 Sep 2024 11:28 )  Alb: 3.8 g/dL / Pro: 6.3 g/dL / ALK PHOS: 88 U/L / ALT: 8 U/L / AST: 14 U/L / GGT: x             Urinalysis with Rflx Culture (collected 09-06-24 @ 23:56)      Urinalysis Basic - ( 07 Sep 2024 11:28 )    Color: x / Appearance: x / SG: x / pH: x  Gluc: 107 mg/dL / Ketone: x  / Bili: x / Urobili: x   Blood: x / Protein: x / Nitrite: x   Leuk Esterase: x / RBC: x / WBC x   Sq Epi: x / Non Sq Epi: x / Bacteria: x          Social History:  Tobacco Use: No  Alcohol Use: No  Drug Use: No    RADIOLOGY & ADDITIONAL TESTS:  Personal review of radiological diagnostics performed  Echo and EKG results noted when applicable.     MEDICATIONS:  acetaminophen     Tablet .. 650 milliGRAM(s) Oral every 6 hours PRN  albuterol    90 MICROgram(s) HFA Inhaler 2 Puff(s) Inhalation every 6 hours PRN  aluminum hydroxide/magnesium hydroxide/simethicone Suspension 30 milliLiter(s) Oral every 4 hours PRN  ammonium lactate 12% Lotion 1 Application(s) Topical every 12 hours  baclofen 5 milliGRAM(s) Oral every 8 hours  bisacodyl Suppository 10 milliGRAM(s) Rectal daily  calcium carbonate   1250 mG (OsCal) 1 Tablet(s) Oral two times a day  ciprofloxacin   IVPB 400 milliGRAM(s) IV Intermittent every 12 hours  enoxaparin Injectable 40 milliGRAM(s) SubCutaneous every 24 hours  lactulose Syrup 20 Gram(s) Oral every 12 hours  melatonin 3 milliGRAM(s) Oral at bedtime PRN  mineral oil/petrolatum Hydrophilic Ointment 1 Application(s) Topical daily  multivitamin 1 Tablet(s) Oral daily  ondansetron Injectable 4 milliGRAM(s) IV Push every 8 hours PRN  pantoprazole    Tablet 40 milliGRAM(s) Oral before breakfast  PHENobarbital 64.8 milliGRAM(s) Oral daily  polyethylene glycol 3350 17 Gram(s) Oral two times a day  tamsulosin 0.4 milliGRAM(s) Oral at bedtime      ANTIBIOTICS:  ciprofloxacin   IVPB 400 milliGRAM(s) IV Intermittent every 12 hours

## 2024-09-09 NOTE — PROCEDURE NOTE - NSCATHTYPE_GEN_A_CORE
Neoplasms vs. TB   CT chest (5/19/20): 3.8 x 1.9 cm spiculated right upper lobe mass with central cavitation   Thoracic sx, ID and Pulmonary consult appreciated.   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 Marley, he will schedule pt for VATS this admission.   continue with Isolation until Lesion can be culture and TB can be ruled out definitively cystostomy

## 2024-09-09 NOTE — DISCHARGE NOTE PROVIDER - NSDCCPCAREPLAN_GEN_ALL_CORE_FT
PRINCIPAL DISCHARGE DIAGNOSIS  Diagnosis: Urinary retention  Assessment and Plan of Treatment: You came in for a clogged suprapubid catheter. The catheter was exchanged in the emergency room and is working. Your urine was also growing bacteria which was suspcious for a urine infection. You were treated with oral ciprofloxacin. Please continue taking the antibiotics to completion (finish date 9/12/24).  Please follow up with urology and your primary care for evalution of your urine infection and catheter.      SECONDARY DISCHARGE DIAGNOSES  Diagnosis: Urinary tract infection  Assessment and Plan of Treatment:

## 2024-09-10 ENCOUNTER — TRANSCRIPTION ENCOUNTER (OUTPATIENT)
Age: 69
End: 2024-09-10

## 2024-09-10 ENCOUNTER — APPOINTMENT (OUTPATIENT)
Dept: UROLOGY | Facility: CLINIC | Age: 69
End: 2024-09-10

## 2024-09-10 VITALS
TEMPERATURE: 98 F | HEART RATE: 83 BPM | SYSTOLIC BLOOD PRESSURE: 122 MMHG | OXYGEN SATURATION: 98 % | RESPIRATION RATE: 18 BRPM | DIASTOLIC BLOOD PRESSURE: 70 MMHG

## 2024-09-10 PROCEDURE — 74018 RADEX ABDOMEN 1 VIEW: CPT | Mod: 26

## 2024-09-10 PROCEDURE — 99232 SBSQ HOSP IP/OBS MODERATE 35: CPT | Mod: GC

## 2024-09-10 RX ADMIN — ENOXAPARIN SODIUM 40 MILLIGRAM(S): 100 INJECTION SUBCUTANEOUS at 05:29

## 2024-09-10 RX ADMIN — Medication 1 TABLET(S): at 05:32

## 2024-09-10 RX ADMIN — Medication 1 APPLICATION(S): at 11:49

## 2024-09-10 RX ADMIN — BACLOFEN 5 MILLIGRAM(S): 0.5 INJECTION INTRATHECAL at 13:13

## 2024-09-10 RX ADMIN — PHENOBARBITAL 64.8 MILLIGRAM(S): 15 TABLET ORAL at 11:50

## 2024-09-10 RX ADMIN — Medication 1 TABLET(S): at 11:50

## 2024-09-10 RX ADMIN — Medication 750 MILLIGRAM(S): at 05:31

## 2024-09-10 RX ADMIN — Medication 1 APPLICATION(S): at 05:31

## 2024-09-10 RX ADMIN — BACLOFEN 5 MILLIGRAM(S): 0.5 INJECTION INTRATHECAL at 05:32

## 2024-09-10 NOTE — DISCHARGE NOTE NURSING/CASE MANAGEMENT/SOCIAL WORK - PATIENT PORTAL LINK FT
You can access the FollowMyHealth Patient Portal offered by [facility] by registering at the following website: [email or web address]. By joining [employer]’s FollowMyHealth portal, you will also be able to view your health information using other applications (apps) compatible with our system.

## 2024-09-12 ENCOUNTER — INPATIENT (INPATIENT)
Facility: HOSPITAL | Age: 69
LOS: 3 days | Discharge: LEFT BEFORE DISCHARGE | DRG: 698 | End: 2024-09-16
Attending: STUDENT IN AN ORGANIZED HEALTH CARE EDUCATION/TRAINING PROGRAM | Admitting: STUDENT IN AN ORGANIZED HEALTH CARE EDUCATION/TRAINING PROGRAM
Payer: MEDICARE

## 2024-09-12 VITALS
TEMPERATURE: 98 F | HEART RATE: 87 BPM | SYSTOLIC BLOOD PRESSURE: 90 MMHG | OXYGEN SATURATION: 94 % | RESPIRATION RATE: 18 BRPM | DIASTOLIC BLOOD PRESSURE: 50 MMHG

## 2024-09-12 DIAGNOSIS — Z98.890 OTHER SPECIFIED POSTPROCEDURAL STATES: Chronic | ICD-10-CM

## 2024-09-12 DIAGNOSIS — Z93.59 OTHER CYSTOSTOMY STATUS: Chronic | ICD-10-CM

## 2024-09-12 DIAGNOSIS — Z93.1 GASTROSTOMY STATUS: Chronic | ICD-10-CM

## 2024-09-12 DIAGNOSIS — N10 ACUTE PYELONEPHRITIS: ICD-10-CM

## 2024-09-12 LAB
ALBUMIN SERPL ELPH-MCNC: 4.1 G/DL — SIGNIFICANT CHANGE UP (ref 3.5–5.2)
ALP SERPL-CCNC: 85 U/L — SIGNIFICANT CHANGE UP (ref 30–115)
ALT FLD-CCNC: 15 U/L — SIGNIFICANT CHANGE UP (ref 0–41)
ANION GAP SERPL CALC-SCNC: 11 MMOL/L — SIGNIFICANT CHANGE UP (ref 7–14)
APPEARANCE UR: CLEAR — SIGNIFICANT CHANGE UP
AST SERPL-CCNC: 31 U/L — SIGNIFICANT CHANGE UP (ref 0–41)
BACTERIA # UR AUTO: NEGATIVE /HPF — SIGNIFICANT CHANGE UP
BASE EXCESS BLDV CALC-SCNC: 6.8 MMOL/L — HIGH (ref -2–3)
BASOPHILS # BLD AUTO: 0.03 K/UL — SIGNIFICANT CHANGE UP (ref 0–0.2)
BASOPHILS NFR BLD AUTO: 0.4 % — SIGNIFICANT CHANGE UP (ref 0–1)
BILIRUB SERPL-MCNC: <0.2 MG/DL — SIGNIFICANT CHANGE UP (ref 0.2–1.2)
BILIRUB UR-MCNC: NEGATIVE — SIGNIFICANT CHANGE UP
BUN SERPL-MCNC: 9 MG/DL — LOW (ref 10–20)
CALCIUM SERPL-MCNC: 9.2 MG/DL — SIGNIFICANT CHANGE UP (ref 8.4–10.5)
CAST: 0 /LPF — SIGNIFICANT CHANGE UP (ref 0–4)
CHLORIDE SERPL-SCNC: 99 MMOL/L — SIGNIFICANT CHANGE UP (ref 98–110)
CO2 SERPL-SCNC: 27 MMOL/L — SIGNIFICANT CHANGE UP (ref 17–32)
COLOR SPEC: YELLOW — SIGNIFICANT CHANGE UP
CREAT SERPL-MCNC: 0.6 MG/DL — LOW (ref 0.7–1.5)
DIFF PNL FLD: NEGATIVE — SIGNIFICANT CHANGE UP
EGFR: 105 ML/MIN/1.73M2 — SIGNIFICANT CHANGE UP
EOSINOPHIL # BLD AUTO: 0.22 K/UL — SIGNIFICANT CHANGE UP (ref 0–0.7)
EOSINOPHIL NFR BLD AUTO: 3.2 % — SIGNIFICANT CHANGE UP (ref 0–8)
GAS PNL BLDV: 131 MMOL/L — LOW (ref 136–145)
GAS PNL BLDV: SIGNIFICANT CHANGE UP
GLUCOSE SERPL-MCNC: 122 MG/DL — HIGH (ref 70–99)
GLUCOSE UR QL: NEGATIVE MG/DL — SIGNIFICANT CHANGE UP
HCO3 BLDV-SCNC: 31 MMOL/L — HIGH (ref 22–29)
HCT VFR BLD CALC: 39.9 % — LOW (ref 42–52)
HGB BLD-MCNC: 13.6 G/DL — LOW (ref 14–18)
IMM GRANULOCYTES NFR BLD AUTO: 0.4 % — HIGH (ref 0.1–0.3)
KETONES UR-MCNC: NEGATIVE MG/DL — SIGNIFICANT CHANGE UP
LACTATE BLDV-MCNC: 1.6 MMOL/L — SIGNIFICANT CHANGE UP (ref 0.5–2)
LACTATE SERPL-SCNC: 1.3 MMOL/L — SIGNIFICANT CHANGE UP (ref 0.7–2)
LEUKOCYTE ESTERASE UR-ACNC: ABNORMAL
LIDOCAIN IGE QN: 32 U/L — SIGNIFICANT CHANGE UP (ref 7–60)
LYMPHOCYTES # BLD AUTO: 1.62 K/UL — SIGNIFICANT CHANGE UP (ref 1.2–3.4)
LYMPHOCYTES # BLD AUTO: 23.3 % — SIGNIFICANT CHANGE UP (ref 20.5–51.1)
MCHC RBC-ENTMCNC: 30.4 PG — SIGNIFICANT CHANGE UP (ref 27–31)
MCHC RBC-ENTMCNC: 34.1 G/DL — SIGNIFICANT CHANGE UP (ref 32–37)
MCV RBC AUTO: 89.1 FL — SIGNIFICANT CHANGE UP (ref 80–94)
MONOCYTES # BLD AUTO: 0.66 K/UL — HIGH (ref 0.1–0.6)
MONOCYTES NFR BLD AUTO: 9.5 % — HIGH (ref 1.7–9.3)
NEUTROPHILS # BLD AUTO: 4.4 K/UL — SIGNIFICANT CHANGE UP (ref 1.4–6.5)
NEUTROPHILS NFR BLD AUTO: 63.2 % — SIGNIFICANT CHANGE UP (ref 42.2–75.2)
NITRITE UR-MCNC: NEGATIVE — SIGNIFICANT CHANGE UP
NRBC # BLD: 0 /100 WBCS — SIGNIFICANT CHANGE UP (ref 0–0)
PCO2 BLDV: 43 MMHG — SIGNIFICANT CHANGE UP (ref 42–55)
PH BLDV: 7.47 — HIGH (ref 7.32–7.43)
PH UR: 7.5 — SIGNIFICANT CHANGE UP (ref 5–8)
PLATELET # BLD AUTO: 286 K/UL — SIGNIFICANT CHANGE UP (ref 130–400)
PMV BLD: 9.8 FL — SIGNIFICANT CHANGE UP (ref 7.4–10.4)
PO2 BLDV: 70 MMHG — HIGH (ref 25–45)
POTASSIUM SERPL-MCNC: 4.9 MMOL/L — SIGNIFICANT CHANGE UP (ref 3.5–5)
POTASSIUM SERPL-SCNC: 4.9 MMOL/L — SIGNIFICANT CHANGE UP (ref 3.5–5)
PROT SERPL-MCNC: 6.9 G/DL — SIGNIFICANT CHANGE UP (ref 6–8)
PROT UR-MCNC: NEGATIVE MG/DL — SIGNIFICANT CHANGE UP
RBC # BLD: 4.48 M/UL — LOW (ref 4.7–6.1)
RBC # FLD: 13.4 % — SIGNIFICANT CHANGE UP (ref 11.5–14.5)
RBC CASTS # UR COMP ASSIST: 0 /HPF — SIGNIFICANT CHANGE UP (ref 0–4)
SAO2 % BLDV: 95.6 % — HIGH (ref 67–88)
SODIUM SERPL-SCNC: 137 MMOL/L — SIGNIFICANT CHANGE UP (ref 135–146)
SP GR SPEC: 1.02 — SIGNIFICANT CHANGE UP (ref 1–1.03)
SQUAMOUS # UR AUTO: 0 /HPF — SIGNIFICANT CHANGE UP (ref 0–5)
UROBILINOGEN FLD QL: 0.2 MG/DL — SIGNIFICANT CHANGE UP (ref 0.2–1)
WBC # BLD: 6.96 K/UL — SIGNIFICANT CHANGE UP (ref 4.8–10.8)
WBC # FLD AUTO: 6.96 K/UL — SIGNIFICANT CHANGE UP (ref 4.8–10.8)
WBC UR QL: 5 /HPF — SIGNIFICANT CHANGE UP (ref 0–5)

## 2024-09-12 PROCEDURE — 36415 COLL VENOUS BLD VENIPUNCTURE: CPT

## 2024-09-12 PROCEDURE — 0225U NFCT DS DNA&RNA 21 SARSCOV2: CPT

## 2024-09-12 PROCEDURE — 99285 EMERGENCY DEPT VISIT HI MDM: CPT | Mod: GC

## 2024-09-12 PROCEDURE — 82962 GLUCOSE BLOOD TEST: CPT

## 2024-09-12 PROCEDURE — 83735 ASSAY OF MAGNESIUM: CPT

## 2024-09-12 PROCEDURE — 71045 X-RAY EXAM CHEST 1 VIEW: CPT | Mod: 26

## 2024-09-12 PROCEDURE — 74018 RADEX ABDOMEN 1 VIEW: CPT

## 2024-09-12 PROCEDURE — 71045 X-RAY EXAM CHEST 1 VIEW: CPT

## 2024-09-12 PROCEDURE — 74177 CT ABD & PELVIS W/CONTRAST: CPT | Mod: 26,MC

## 2024-09-12 PROCEDURE — 80053 COMPREHEN METABOLIC PANEL: CPT

## 2024-09-12 PROCEDURE — 85025 COMPLETE CBC W/AUTO DIFF WBC: CPT

## 2024-09-12 RX ORDER — POLYETHYLENE GLYCOL 3350 17 G/17G
17 POWDER, FOR SOLUTION ORAL
Refills: 0 | Status: DISCONTINUED | OUTPATIENT
Start: 2024-09-12 | End: 2024-09-16

## 2024-09-12 RX ORDER — TAMSULOSIN HYDROCHLORIDE 0.4 MG/1
0.4 CAPSULE ORAL AT BEDTIME
Refills: 0 | Status: DISCONTINUED | OUTPATIENT
Start: 2024-09-12 | End: 2024-09-16

## 2024-09-12 RX ORDER — CALCIUM CARBONATE 500(1250)
1 TABLET ORAL
Refills: 0 | Status: DISCONTINUED | OUTPATIENT
Start: 2024-09-12 | End: 2024-09-16

## 2024-09-12 RX ORDER — SENNA 187 MG
2 TABLET ORAL AT BEDTIME
Refills: 0 | Status: DISCONTINUED | OUTPATIENT
Start: 2024-09-12 | End: 2024-09-16

## 2024-09-12 RX ORDER — LACTULOSE 10 G
10 PACKET (EA) ORAL DAILY
Refills: 0 | Status: DISCONTINUED | OUTPATIENT
Start: 2024-09-12 | End: 2024-09-16

## 2024-09-12 RX ORDER — PHENOBARBITAL 15 MG/1
64.8 TABLET ORAL DAILY
Refills: 0 | Status: DISCONTINUED | OUTPATIENT
Start: 2024-09-12 | End: 2024-09-16

## 2024-09-12 RX ORDER — BACLOFEN 0.5 MG/ML
1 INJECTION INTRATHECAL
Refills: 0 | DISCHARGE

## 2024-09-12 RX ORDER — CLOTRIMAZOLE 1 %
1 CREAM (GRAM) TOPICAL EVERY 12 HOURS
Refills: 0 | Status: DISCONTINUED | OUTPATIENT
Start: 2024-09-12 | End: 2024-09-13

## 2024-09-12 RX ORDER — ACETAMINOPHEN 325 MG/1
650 TABLET ORAL ONCE
Refills: 0 | Status: COMPLETED | OUTPATIENT
Start: 2024-09-12 | End: 2024-09-12

## 2024-09-12 RX ORDER — PANTOPRAZOLE SODIUM 40 MG
40 TABLET, DELAYED RELEASE (ENTERIC COATED) ORAL
Refills: 0 | Status: DISCONTINUED | OUTPATIENT
Start: 2024-09-12 | End: 2024-09-16

## 2024-09-12 RX ORDER — AMMONIUM LACTATE 12 %
1 LOTION (GRAM) TOPICAL
Refills: 0 | Status: DISCONTINUED | OUTPATIENT
Start: 2024-09-12 | End: 2024-09-16

## 2024-09-12 RX ORDER — ENOXAPARIN SODIUM 100 MG/ML
40 INJECTION SUBCUTANEOUS EVERY 24 HOURS
Refills: 0 | Status: DISCONTINUED | OUTPATIENT
Start: 2024-09-12 | End: 2024-09-16

## 2024-09-12 RX ORDER — POVIDONE, PROPYLENE GLYCOL 6.8; 3 MG/ML; MG/ML
1 LIQUID OPHTHALMIC THREE TIMES A DAY
Refills: 0 | Status: DISCONTINUED | OUTPATIENT
Start: 2024-09-12 | End: 2024-09-16

## 2024-09-12 RX ORDER — BACLOFEN 0.5 MG/ML
10 INJECTION INTRATHECAL EVERY 6 HOURS
Refills: 0 | Status: DISCONTINUED | OUTPATIENT
Start: 2024-09-12 | End: 2024-09-16

## 2024-09-12 RX ORDER — GUAIFENESIN/DEXTROMETHORPHAN 100-10MG/5
10 SYRUP ORAL THREE TIMES A DAY
Refills: 0 | Status: DISCONTINUED | OUTPATIENT
Start: 2024-09-12 | End: 2024-09-16

## 2024-09-12 RX ORDER — CASEIN/A,D/METHYLBNZ/PETROLAT
1 OINTMENT (GRAM) TOPICAL DAILY
Refills: 0 | Status: DISCONTINUED | OUTPATIENT
Start: 2024-09-12 | End: 2024-09-16

## 2024-09-12 RX ORDER — SODIUM PHOSPHATE, DIBASIC AND SODIUM PHOSPHATE, MONOBASIC 7; 19 G/230ML; G/230ML
1 ENEMA RECTAL
Refills: 0 | Status: DISCONTINUED | OUTPATIENT
Start: 2024-09-12 | End: 2024-09-14

## 2024-09-12 RX ADMIN — Medication 1000 MILLILITER(S): at 13:30

## 2024-09-12 RX ADMIN — Medication 2 TABLET(S): at 23:52

## 2024-09-12 NOTE — ED PROVIDER NOTE - CLINICAL SUMMARY MEDICAL DECISION MAKING FREE TEXT BOX
pt was a signout from Dr. Swan. 68 yr old m that presents with abd pain. on imaging, concern for pyelonephritis. iv antibiotics. Labs were ordered and reviewed.  Imaging was ordered and reviewed by me.  Appropriate medications for patient's presenting complaints were ordered and effects were reassessed.  Patient's records (prior hospital, ED visit, and/or nursing home notes if available) were reviewed.  Additional history was obtained from EMS, family, and/or PCP (where available).  Escalation to admission/observation was considered.  Patient requires inpatient hospitalization -medicine.

## 2024-09-12 NOTE — ED PROVIDER NOTE - PHYSICAL EXAMINATION
VITAL SIGNS: I have reviewed nursing notes and confirm.  CONSTITUTIONAL: Chronically ill-appearing male in no acute distress.  SKIN: Skin exam is warm and dry, no acute rash.  EYES: PERRL, conjunctiva and sclera clear.  ENT: dry mm  CARD: S1, S2 normal; no murmurs, gallops, or rubs. Regular rate and rhythm.  RESP: Normal respiratory effort, no tachypnea or distress. Lungs CTAB, no wheezes, rales or rhonchi.  ABD: soft, ND, (+)diffuse ttp, no guarding or rigidity, G tube in place without erythema/discharge. Suprapubic catheter in place without erythema/drainage, clear/yellow urine in bag  EXT: contracted chronically  NEURO: Alert, oriented. Grossly unremarkable. No focal deficits.  PSYCH: Cooperative, appropriate.

## 2024-09-12 NOTE — ED ADULT NURSE NOTE - OBJECTIVE STATEMENT
pt c/o abdominal pain around g tube, states he was recently in the hospital for similar issue but no change in symptoms

## 2024-09-12 NOTE — ED ADULT NURSE NOTE - NSFALLHARMRISKINTERV_ED_ALL_ED

## 2024-09-12 NOTE — ED PROVIDER NOTE - PROGRESS NOTE DETAILS
RAMÓN: CT shows likely pyelonephritis, urine added and shows small leuk esterase with micro pending.  Admitted to medicine for pyelonephritis, IV antibiotics ordered.

## 2024-09-12 NOTE — H&P ADULT - ASSESSMENT
68-year-old male with PMH of cerebral palsy , seizure disorder, BPH, suprapubic catheter, PEG tube, bed/wheelchair-bound at baseline presents to ED for evaluation of abdominal pain x 2 days.   As per  patient reports diffuse abdominal pain worse in his epigastrium that is sharp, constant, nonradiating.  Associated with sore throat and intermittent dry cough.    Also reports clear yellow urine draining in his bag.  Denies any fever/chills, congestion, productivity of his cough, CP, SOB, vomiting, diarrhea, black or bloody stools, hematuria.    States he tolerates food by mouth but uses PEG tube for medications and has used it without difficulty.    #Cystitis   #S/P suprapubic cath   #Previously had multiple UTIs with PCNs and last was 5 days back with SPC   -In Ed on vitals BP was 90/50 improved on fluids , afebrile on RA , HR was 87   -on CTAP : Findings suspicious for urinary bladder urinary bladder infection with   left ascending urinary tract infection in the appropriate clinical   setting.  - UCx in 5/24 showed pseudomonas with S<0.25 for cipro , rcvd a course on previous admission   -On labs UA was done have small leukocytes esterases   - rcvd levofloxacin and 1l of LR in Ed   PLAN :   -started on levofloxacin   -f/u UCx   -can consider  ID consult     #S/P Peg tube   - C/W pureed diet, pt was on pureed diet in   - s/s eval done in last admission -pt can tolerate moderately thick; pureed : , pills through peg tube.     #Constipation  - belly distended   -CTA/P : Moderate distention of large bowel with gas and stool. Distal rectum   collapsed, without definite imaging evidence of sigmoid volvulus. Mild   wall thickening throughout rectum, raising possibility of proctitis.  -fleet enema Q4 hrs till BM   - c/w  miralax and lactulose  - monitor BMs   -can repeat KUB     #Seizures  #CP   - c/w baclofen  - c/w home phenobarbital     #BPH  - c/w home tamsulosin     #MISC   - Diet - Pureed  - DVT - lovenox subq  - GI proph: PPI .  -ATT     68-year-old male with PMH of cerebral palsy , seizure disorder, BPH, suprapubic catheter, PEG tube, bed/wheelchair-bound at baseline presents to ED for evaluation of abdominal pain x 2 days.   As per  patient reports diffuse abdominal pain worse in his epigastrium that is sharp, constant, nonradiating.  Associated with sore throat and intermittent dry cough.    Also reports clear yellow urine draining in his bag.  Denies any fever/chills, congestion, productivity of his cough, CP, SOB, vomiting, diarrhea, black or bloody stools, hematuria.    States he tolerates food by mouth but uses PEG tube for medications and has used it without difficulty.    #Cystitis   #S/P suprapubic cath   #Previously had multiple UTIs with PCNs and last was 5 days back with SPC   -In Ed on vitals BP was 90/50 improved on fluids , afebrile on RA , HR was 87   -on CTAP : Findings suspicious for urinary bladder urinary bladder infection with   left ascending urinary tract infection in the appropriate clinical   setting.  - UCx in 5/24 showed pseudomonas with S<0.25 for cipro , rcvd a course on previous admission   -On labs UA was done have small leukocytes esterases   - rcvd levofloxacin and 1l of LR in Ed   PLAN :   -started on levofloxacin   -f/u UCx   -f/u  ID consult     #SORET THROAT   #DRY COUGH :   -f/u chest xray   -follow up RVP         #S/P Peg tube   - C/W pureed diet, pt was on pureed diet in   - s/s eval done in last admission -pt can tolerate moderately thick; pureed : , pills through peg tube.     #Constipation  - belly distended   -CTA/P : Moderate distention of large bowel with gas and stool. Distal rectum   collapsed, without definite imaging evidence of sigmoid volvulus. Mild   wall thickening throughout rectum, raising possibility of proctitis.  -fleet enema Q4 hrs till BM   - c/w  miralax and lactulose  - monitor BMs   -f/u  KUB     #Seizures  #CP   - c/w baclofen  - c/w home phenobarbital     #BPH  - c/w home tamsulosin     #MISC   - Diet - Pureed  - DVT - lovenox subq  - GI proph: PPI .  -ATT

## 2024-09-12 NOTE — ED PROVIDER NOTE - OBJECTIVE STATEMENT
68-year-old male with PMH of CP, seizure disorder, BPH, suprapubic catheter, PEG tube, bed/wheelchair-bound at baseline presents to ED for evaluation of abdominal pain x 2 days.  Patient reports diffuse abdominal pain worse in his epigastrium that is sharp, constant, nonradiating.  Associated with sore throat and intermittent dry cough.  He denies any fever/chills, congestion, productivity of his cough, CP, SOB, vomiting, diarrhea, black or bloody stools, hematuria.  Reports clear yellow urine draining in his bag.  States he tolerates food by mouth but uses PEG tube for medications and has used it without difficulty.  Of note patient was recently admitted last week for 3 days for UTI for which she received antibiotics.

## 2024-09-12 NOTE — H&P ADULT - HISTORY OF PRESENT ILLNESS
68-year-old male with PMH of cerebral palsy , seizure disorder, BPH, suprapubic catheter, PEG tube, bed/wheelchair-bound at baseline presents to ED for evaluation of abdominal pain x 2 days.   As per  patient reports diffuse abdominal pain worse in his epigastrium that is sharp, constant, nonradiating.  Associated with sore throat and intermittent dry cough.    Also reports clear yellow urine draining in his bag.  Denies any fever/chills, congestion, productivity of his cough, CP, SOB, vomiting, diarrhea, black or bloody stools, hematuria.    States he tolerates food by mouth but uses PEG tube for medications and has used it without difficulty.     Of note: patient was recently admitted last week for 3 days for UTI for which she received antibiotics    In Ed on vitals BP was 90/50 improved on fluids , afebrile on RA , HR was 87   On labs UA was done have small leukocytes esterases   On imaging   CTA/P : Moderate distention of large bowel with gas and stool. Distal rectum   collapsed, without definite imaging evidence of sigmoid volvulus. Mild   wall thickening throughout rectum, raising possibility of proctitis.    Findings suspicious for urinary bladder urinary bladder infection with   left ascending urinary tract infection in the appropriate clinical   setting.      rcvd levofloxacin and 1l of LR in Ed     admitted for further workup .

## 2024-09-12 NOTE — H&P ADULT - NSHPPHYSICALEXAM_GEN_ALL_CORE
CONSTITUTIONAL: Chronically ill-appearing male in no acute distress.  CARD: . Regular rate and rhythm.  RESP:CTAB,  ABD: dsitended e diffuse tenderness , G tube in placeSuprapubic catheter in place without erythema/drainage, clear/yellow urine in bag  EXT: contracted chronically  PSYCH: Cooperative, appropriate CONSTITUTIONAL: Chronically ill-appearing male in no acute distress.  CARD: . Regular rate and rhythm.  RESP:CTAB,  ABD: dsitended e diffuse tenderness , G tube in place Suprapubic catheter in place without erythema/drainage, clear/yellow urine in bag  EXT: contracted chronically  PSYCH: Cooperative, appropriate

## 2024-09-12 NOTE — ED PROVIDER NOTE - ATTENDING CONTRIBUTION TO CARE
I have reviewed and agree with the mid-level note, except as documented in my note below.    68-year-old male history of cerebral palsy, seizure disorder, BPH, status post PEG and to review catheter, admitted 9/7 to 9/10 for UTI, now presents with upper abdominal pain, predominantly around the PEG insertion for the past several days, associated sore throat and anorexia, denies fever, n/v/d, cough, respiratory sx, change in bowel habits or urinary sx, scrotal pain, penile d/c or other associated complaints at present. Old chart reviewed. I have reviewed and agree with the initial nursing note, except as documented in my note.    VSS, awake, alert, non-toxic appearing, no scleral icterus, oropharynx clear, mmm, no jaundice, skin rash or lesions, chest CTAB, non-labored breathing, no w/r/r, +S1/S2, RRR, no m/r/g, abdomen soft, mild-mod ttp upper abdomen w/o peritoneal signs, +BS, no hernias or distention, no pulsatile masses or bruits appreciated, no CVA tenderness, no peripheral edema or acute deformities.

## 2024-09-13 LAB
ALBUMIN SERPL ELPH-MCNC: 3.9 G/DL — SIGNIFICANT CHANGE UP (ref 3.5–5.2)
ALP SERPL-CCNC: 80 U/L — SIGNIFICANT CHANGE UP (ref 30–115)
ALT FLD-CCNC: 14 U/L — SIGNIFICANT CHANGE UP (ref 0–41)
ANION GAP SERPL CALC-SCNC: 11 MMOL/L — SIGNIFICANT CHANGE UP (ref 7–14)
AST SERPL-CCNC: 17 U/L — SIGNIFICANT CHANGE UP (ref 0–41)
BASOPHILS # BLD AUTO: 0.04 K/UL — SIGNIFICANT CHANGE UP (ref 0–0.2)
BASOPHILS NFR BLD AUTO: 0.6 % — SIGNIFICANT CHANGE UP (ref 0–1)
BILIRUB SERPL-MCNC: <0.2 MG/DL — SIGNIFICANT CHANGE UP (ref 0.2–1.2)
BUN SERPL-MCNC: 13 MG/DL — SIGNIFICANT CHANGE UP (ref 10–20)
CALCIUM SERPL-MCNC: 9 MG/DL — SIGNIFICANT CHANGE UP (ref 8.4–10.5)
CHLORIDE SERPL-SCNC: 101 MMOL/L — SIGNIFICANT CHANGE UP (ref 98–110)
CO2 SERPL-SCNC: 27 MMOL/L — SIGNIFICANT CHANGE UP (ref 17–32)
CREAT SERPL-MCNC: 0.7 MG/DL — SIGNIFICANT CHANGE UP (ref 0.7–1.5)
EGFR: 100 ML/MIN/1.73M2 — SIGNIFICANT CHANGE UP
EOSINOPHIL # BLD AUTO: 0.24 K/UL — SIGNIFICANT CHANGE UP (ref 0–0.7)
EOSINOPHIL NFR BLD AUTO: 3.5 % — SIGNIFICANT CHANGE UP (ref 0–8)
GLUCOSE SERPL-MCNC: 94 MG/DL — SIGNIFICANT CHANGE UP (ref 70–99)
HCT VFR BLD CALC: 38.1 % — LOW (ref 42–52)
HGB BLD-MCNC: 12.7 G/DL — LOW (ref 14–18)
IMM GRANULOCYTES NFR BLD AUTO: 0.4 % — HIGH (ref 0.1–0.3)
LYMPHOCYTES # BLD AUTO: 1.36 K/UL — SIGNIFICANT CHANGE UP (ref 1.2–3.4)
LYMPHOCYTES # BLD AUTO: 19.9 % — LOW (ref 20.5–51.1)
MAGNESIUM SERPL-MCNC: 2.1 MG/DL — SIGNIFICANT CHANGE UP (ref 1.8–2.4)
MCHC RBC-ENTMCNC: 30.1 PG — SIGNIFICANT CHANGE UP (ref 27–31)
MCHC RBC-ENTMCNC: 33.3 G/DL — SIGNIFICANT CHANGE UP (ref 32–37)
MCV RBC AUTO: 90.3 FL — SIGNIFICANT CHANGE UP (ref 80–94)
MONOCYTES # BLD AUTO: 0.72 K/UL — HIGH (ref 0.1–0.6)
MONOCYTES NFR BLD AUTO: 10.5 % — HIGH (ref 1.7–9.3)
NEUTROPHILS # BLD AUTO: 4.46 K/UL — SIGNIFICANT CHANGE UP (ref 1.4–6.5)
NEUTROPHILS NFR BLD AUTO: 65.1 % — SIGNIFICANT CHANGE UP (ref 42.2–75.2)
NRBC # BLD: 0 /100 WBCS — SIGNIFICANT CHANGE UP (ref 0–0)
PLATELET # BLD AUTO: 268 K/UL — SIGNIFICANT CHANGE UP (ref 130–400)
PMV BLD: 10.1 FL — SIGNIFICANT CHANGE UP (ref 7.4–10.4)
POTASSIUM SERPL-MCNC: 4.2 MMOL/L — SIGNIFICANT CHANGE UP (ref 3.5–5)
POTASSIUM SERPL-SCNC: 4.2 MMOL/L — SIGNIFICANT CHANGE UP (ref 3.5–5)
PROT SERPL-MCNC: 6.4 G/DL — SIGNIFICANT CHANGE UP (ref 6–8)
RBC # BLD: 4.22 M/UL — LOW (ref 4.7–6.1)
RBC # FLD: 13.7 % — SIGNIFICANT CHANGE UP (ref 11.5–14.5)
SODIUM SERPL-SCNC: 139 MMOL/L — SIGNIFICANT CHANGE UP (ref 135–146)
WBC # BLD: 6.85 K/UL — SIGNIFICANT CHANGE UP (ref 4.8–10.8)
WBC # FLD AUTO: 6.85 K/UL — SIGNIFICANT CHANGE UP (ref 4.8–10.8)

## 2024-09-13 PROCEDURE — 99232 SBSQ HOSP IP/OBS MODERATE 35: CPT

## 2024-09-13 PROCEDURE — 71045 X-RAY EXAM CHEST 1 VIEW: CPT | Mod: 26

## 2024-09-13 PROCEDURE — 74018 RADEX ABDOMEN 1 VIEW: CPT | Mod: 26

## 2024-09-13 RX ORDER — CLOTRIMAZOLE 1 %
1 CREAM (GRAM) TOPICAL EVERY 12 HOURS
Refills: 0 | Status: DISCONTINUED | OUTPATIENT
Start: 2024-09-13 | End: 2024-09-13

## 2024-09-13 RX ADMIN — POVIDONE, PROPYLENE GLYCOL 1 DROP(S): 6.8; 3 LIQUID OPHTHALMIC at 14:17

## 2024-09-13 RX ADMIN — BACLOFEN 10 MILLIGRAM(S): 0.5 INJECTION INTRATHECAL at 11:20

## 2024-09-13 RX ADMIN — POVIDONE, PROPYLENE GLYCOL 1 DROP(S): 6.8; 3 LIQUID OPHTHALMIC at 21:31

## 2024-09-13 RX ADMIN — Medication 2 TABLET(S): at 21:31

## 2024-09-13 RX ADMIN — Medication 500 MILLIGRAM(S): at 18:56

## 2024-09-13 RX ADMIN — ENOXAPARIN SODIUM 40 MILLIGRAM(S): 100 INJECTION SUBCUTANEOUS at 18:56

## 2024-09-13 RX ADMIN — PHENOBARBITAL 64.8 MILLIGRAM(S): 15 TABLET ORAL at 11:19

## 2024-09-13 RX ADMIN — POLYETHYLENE GLYCOL 3350 17 GRAM(S): 17 POWDER, FOR SOLUTION ORAL at 06:56

## 2024-09-13 RX ADMIN — BACLOFEN 10 MILLIGRAM(S): 0.5 INJECTION INTRATHECAL at 18:57

## 2024-09-13 RX ADMIN — Medication 1 TABLET(S): at 19:01

## 2024-09-13 RX ADMIN — BACLOFEN 10 MILLIGRAM(S): 0.5 INJECTION INTRATHECAL at 00:31

## 2024-09-13 RX ADMIN — Medication 10 GRAM(S): at 11:19

## 2024-09-13 RX ADMIN — POVIDONE, PROPYLENE GLYCOL 1 DROP(S): 6.8; 3 LIQUID OPHTHALMIC at 06:57

## 2024-09-13 RX ADMIN — Medication 1 APPLICATION(S): at 11:19

## 2024-09-13 RX ADMIN — BACLOFEN 10 MILLIGRAM(S): 0.5 INJECTION INTRATHECAL at 07:02

## 2024-09-13 RX ADMIN — Medication 1 TABLET(S): at 07:02

## 2024-09-13 RX ADMIN — Medication 40 MILLIGRAM(S): at 06:57

## 2024-09-13 RX ADMIN — Medication 1 APPLICATION(S): at 21:32

## 2024-09-13 RX ADMIN — Medication 1 APPLICATION(S): at 06:56

## 2024-09-13 RX ADMIN — TAMSULOSIN HYDROCHLORIDE 0.4 MILLIGRAM(S): 0.4 CAPSULE ORAL at 21:31

## 2024-09-13 NOTE — ED ADULT NURSE REASSESSMENT NOTE - NS ED NURSE REASSESS COMMENT FT1
patient admitted to medicine and moved to ED4 bed3.  Report given to DANIA Alonzo.  Patient in stable condition and nad.

## 2024-09-13 NOTE — CONSULT NOTE ADULT - ASSESSMENT
68-year-old male with PMH of cerebral palsy , seizure disorder, BPH, suprapubic catheter, PEG tube, bed/wheelchair-bound at baseline presents to ED for evaluation of abdominal pain x 2 days, worse in his epigastrium,  sharp, constant, nonradiating.  Associated with sore throat and intermittent dry cough. Urine clear  Denies any fever/chills, congestion, productivity of his cough, CP, SOB, vomiting, diarrhea, black or bloody stools, hematuria.    States he tolerates food by mouth but uses PEG tube for medications and has used it without difficulty.  Admitted last week for 3 days for UTI for which she received antibiotics    In Ed on vitals BP was 90/50 improved on fluids , afebrile on RA , HR was 87   9/12 CTA/P : Possible proctitis.Findings suspicious for urinary bladder urinary bladder infection with left ascending urinary tract infection.    IMPRESSION/RECOMMENDATIONS  Immunosuppression/Immunosenescence ( above age 60 yrs there is a exponential decline in immunity which could result in poor clinical outcomes.  68-year-old male with PMH of cerebral palsy , seizure disorder, BPH, suprapubic catheter, PEG tube, bed/wheelchair-bound at baseline presents to ED for evaluation of abdominal pain x 2 days, worse in his epigastrium,  sharp, constant, nonradiating.  Associated with sore throat and intermittent dry cough. Urine clear  Denies any fever/chills, congestion, productivity of his cough, CP, SOB, vomiting, diarrhea, black or bloody stools, hematuria.    States he tolerates food by mouth but uses PEG tube for medications and has used it without difficulty.  Admitted last week for 3 days for UTI for which she received antibiotics    In Ed on vitals BP was 90/50 improved on fluids , afebrile on RA , HR was 87   9/12 CTA/P : Possible proctitis. Findings suspicious for urinary bladder urinary bladder infection with left ascending urinary tract infection.    IMPRESSION/RECOMMENDATIONS  Immunosuppression/Immunosenescence ( above age 60 yrs there is a exponential decline in immunity which could result in poor clinical outcomes.   Possible cystitis/pyelonephritis  9/12 CTA/P : Cystitis, with left ascending urinary tract infection.  WBC 6.9  9/6 UCx P aeruginosa ( no pyuria )    -po Ciprofloxacin 500 mg q12h for 7 days  -Off loading to prevent pressure sores and preventive measures to avoid aspiration  -bedside PT/Rehab. Out of bed to chair if possible.

## 2024-09-13 NOTE — PROGRESS NOTE ADULT - ATTENDING COMMENTS
68-year-old male with PMH of cerebral palsy , seizure disorder, BPH, suprapubic catheter, PEG tube, bed/wheelchair-bound at baseline presents to ED for evaluation of abdominal pain x 2 days.   As per  patient reports diffuse abdominal pain worse in his epigastrium that is sharp, constant, nonradiating, associated with sore throat and intermittent dry cough.        #Cystitis/Pyelonephritis   #S/P suprapubic cath   #Previously had multiple UTIs with PCNs and last was 5 days back with SPC   -In Ed on vitals BP was 90/50 improved on fluids , afebrile on RA , HR was 87   -on CTAP : Findings suspicious for urinary bladder urinary bladder infection with   left ascending urinary tract infection in the appropriate clinical   setting.  - UCx in 5/24 showed pseudomonas with S<0.25 for cipro , rcvd a course on previous admission   -On labs UA was done have small leukocytes esterases   - rcvd levofloxacin and 1l of LR in Ed   - Ciprofloxacin per ID recs    - 9/13: ID consulted -> recommended ciprofloxacin 500mg PO BID for 7 days    #Sore throat  #Dry cough  -f/u chest xray: Low lung volumes with elevated left hemidiaphragm.  -follow up RVP: pending    #S/P Peg tube   - c/w pureed diet, pt was on pureed diet in   - s/s eval done in last admission -> pt can tolerate moderately thick, pureed diet, pills through peg tube.     #Constipation  - belly distended   -CTA/P : Moderate distention of large bowel with gas and stool. Distal rectum   collapsed, without definite imaging evidence of sigmoid volvulus. Mild   wall thickening throughout rectum, raising possibility of proctitis.  -fleet enema Q4 hrs till BM   - c/w  miralax and lactulose  - monitor BMs   -KUB: Diffuse gaseous distention of the large bowel. No evidence for mechanical   obstruction. Gastrostomy catheter overlies stomach. Osseous structures are unchanged.    #Seizures  #CP   - c/w baclofen  - c/w home phenobarbital     #BPH  - c/w home tamsulosin     - Diet - Pureed  - DVT - lovenox subq  - GI proph: PPI .      Pendings: f/u BCx, UCx, c/w abx

## 2024-09-13 NOTE — CONSULT NOTE ADULT - SUBJECTIVE AND OBJECTIVE BOX
HAWA TISH  68y, Male  Allergy: No Known Allergies      All historical available data reviewed.    HPI:  68-year-old male with PMH of cerebral palsy , seizure disorder, BPH, suprapubic catheter, PEG tube, bed/wheelchair-bound at baseline presents to ED for evaluation of abdominal pain x 2 days.   As per  patient reports diffuse abdominal pain worse in his epigastrium that is sharp, constant, nonradiating.  Associated with sore throat and intermittent dry cough.    Also reports clear yellow urine draining in his bag.  Denies any fever/chills, congestion, productivity of his cough, CP, SOB, vomiting, diarrhea, black or bloody stools, hematuria.    States he tolerates food by mouth but uses PEG tube for medications and has used it without difficulty.     Of note: patient was recently admitted last week for 3 days for UTI for which she received antibiotics    In Ed on vitals BP was 90/50 improved on fluids , afebrile on RA , HR was 87   On labs UA was done have small leukocytes esterases   On imaging   CTA/P : Moderate distention of large bowel with gas and stool. Distal rectum   collapsed, without definite imaging evidence of sigmoid volvulus. Mild   wall thickening throughout rectum, raising possibility of proctitis.    Findings suspicious for urinary bladder urinary bladder infection with   left ascending urinary tract infection in the appropriate clinical   setting.      rcvd levofloxacin and 1l of LR in Ed     admitted for further workup .  (12 Sep 2024 22:30)    FAMILY HISTORY:    PAST MEDICAL & SURGICAL HISTORY:  BPH (benign prostatic hyperplasia)      Cerebral palsy      Seizure  last seizure >10 years ago      Osteoporosis      Spastic quadriplegia      Urinary calculi      Urinary retention      Asthma      S/P percutaneous endoscopic gastrostomy (PEG) tube placement      H/O cystoscopy      Suprapubic catheter            VITALS:  T(F): 98.4, Max: 98.8 (24 @ 15:39)  HR: 91  BP: 128/79  RR: 18Vital Signs Last 24 Hrs  T(C): 36.9 (13 Sep 2024 00:58), Max: 37.1 (12 Sep 2024 15:39)  T(F): 98.4 (13 Sep 2024 00:58), Max: 98.8 (12 Sep 2024 15:39)  HR: 91 (13 Sep 2024 00:58) (87 - 91)  BP: 128/79 (13 Sep 2024 00:58) (90/50 - 129/81)  BP(mean): --  RR: 18 (13 Sep 2024 00:58) (18 - 18)  SpO2: 96% (13 Sep 2024 00:58) (94% - 96%)    Parameters below as of 13 Sep 2024 00:58  Patient On (Oxygen Delivery Method): room air        TESTS & MEASUREMENTS:                        13.6   6.96  )-----------( 286      ( 12 Sep 2024 13:36 )             39.9         137  |  99  |  9<L>  ----------------------------<  122<H>  4.9   |  27  |  0.6<L>    Ca    9.2      12 Sep 2024 13:36    TPro  6.9  /  Alb  4.1  /  TBili  <0.2  /  DBili  x   /  AST  31  /  ALT  15  /  AlkPhos  85  12    LIVER FUNCTIONS - ( 12 Sep 2024 13:36 )  Alb: 4.1 g/dL / Pro: 6.9 g/dL / ALK PHOS: 85 U/L / ALT: 15 U/L / AST: 31 U/L / GGT: x             Urinalysis with Rflx Culture (collected 24 @ 23:56)    Culture - Urine (collected 24 @ 23:56)  Source: Clean Catch None  Final Report (09-10-24 @ 13:27):    >100,000 CFU/ml Pseudomonas aeruginosa    50,000 - 99,000 CFU/mL Pseudomonas aeruginosa #2    Multiple Morphological Strains  Organism: Pseudomonas aeruginosa  Pseudomonas aeruginosa (09-10-24 @ 13:27)  Organism: Pseudomonas aeruginosa (09-10-24 @ 13:27)      Method Type: MIGUEL A      -  Amikacin: I 32      -  Aztreonam: S 8      -  Cefepime: I 16      -  Ceftazidime: S 4      -  Ciprofloxacin: S <=0.25      -  Imipenem: S <=1      -  Levofloxacin: S <=0.5      -  Meropenem: S <=1      -  Piperacillin/Tazobactam: S <=8  Organism: Pseudomonas aeruginosa (09-10-24 @ 13:27)      Method Type: MIGUEL A      -  Amikacin: S <=16      -  Aztreonam: S <=4      -  Cefepime: S <=2      -  Ceftazidime: S 4      -  Ciprofloxacin: S <=0.25      -  Imipenem: S 2      -  Levofloxacin: S <=0.5      -  Meropenem: S <=1      -  Piperacillin/Tazobactam: S <=8      Urinalysis Basic - ( 12 Sep 2024 19:54 )    Color: Yellow / Appearance: Clear / S.024 / pH: x  Gluc: x / Ketone: Negative mg/dL  / Bili: Negative / Urobili: 0.2 mg/dL   Blood: x / Protein: Negative mg/dL / Nitrite: Negative   Leuk Esterase: Small / RBC: 0 /HPF / WBC 5 /HPF   Sq Epi: x / Non Sq Epi: 0 /HPF / Bacteria: Negative /HPF          RADIOLOGY & ADDITIONAL TESTS:  Personal review of radiological diagnostics performed  Echo and EKG results noted when applicable.     MEDICATIONS:  albuterol    90 MICROgram(s) HFA Inhaler 2 Puff(s) Inhalation every 6 hours PRN  ammonium lactate 12% Lotion 1 Application(s) Topical two times a day  artificial  tears Solution 1 Drop(s) Both EYES three times a day  baclofen 10 milliGRAM(s) Oral every 6 hours  calcium carbonate   1250 mG (OsCal) 1 Tablet(s) Oral two times a day  enoxaparin Injectable 40 milliGRAM(s) SubCutaneous every 24 hours  guaifenesin/dextromethorphan Oral Liquid 10 milliLiter(s) Oral three times a day PRN  lactulose Syrup 10 Gram(s) Oral daily  levoFLOXacin IVPB 750 milliGRAM(s) IV Intermittent every 24 hours  mineral oil/petrolatum Hydrophilic Ointment 1 Application(s) Topical daily  pantoprazole    Tablet 40 milliGRAM(s) Oral before breakfast  PHENobarbital 64.8 milliGRAM(s) Oral daily  polyethylene glycol 3350 17 Gram(s) Oral two times a day  saline laxative (FLEET) Rectal Enema 1 Enema Rectal <User Schedule>  senna 2 Tablet(s) Oral at bedtime  tamsulosin 0.4 milliGRAM(s) Oral at bedtime      ANTIBIOTICS:  levoFLOXacin IVPB 750 milliGRAM(s) IV Intermittent every 24 hours

## 2024-09-14 ENCOUNTER — TRANSCRIPTION ENCOUNTER (OUTPATIENT)
Age: 69
End: 2024-09-14

## 2024-09-14 LAB
ALBUMIN SERPL ELPH-MCNC: 3.8 G/DL — SIGNIFICANT CHANGE UP (ref 3.5–5.2)
ALP SERPL-CCNC: 76 U/L — SIGNIFICANT CHANGE UP (ref 30–115)
ALT FLD-CCNC: 13 U/L — SIGNIFICANT CHANGE UP (ref 0–41)
ANION GAP SERPL CALC-SCNC: 14 MMOL/L — SIGNIFICANT CHANGE UP (ref 7–14)
AST SERPL-CCNC: 19 U/L — SIGNIFICANT CHANGE UP (ref 0–41)
BASOPHILS # BLD AUTO: 0.03 K/UL — SIGNIFICANT CHANGE UP (ref 0–0.2)
BASOPHILS NFR BLD AUTO: 0.5 % — SIGNIFICANT CHANGE UP (ref 0–1)
BILIRUB SERPL-MCNC: <0.2 MG/DL — SIGNIFICANT CHANGE UP (ref 0.2–1.2)
BUN SERPL-MCNC: 18 MG/DL — SIGNIFICANT CHANGE UP (ref 10–20)
CALCIUM SERPL-MCNC: 8.9 MG/DL — SIGNIFICANT CHANGE UP (ref 8.4–10.5)
CHLORIDE SERPL-SCNC: 105 MMOL/L — SIGNIFICANT CHANGE UP (ref 98–110)
CO2 SERPL-SCNC: 24 MMOL/L — SIGNIFICANT CHANGE UP (ref 17–32)
CREAT SERPL-MCNC: 0.7 MG/DL — SIGNIFICANT CHANGE UP (ref 0.7–1.5)
CULTURE RESULTS: NO GROWTH — SIGNIFICANT CHANGE UP
EGFR: 100 ML/MIN/1.73M2 — SIGNIFICANT CHANGE UP
EOSINOPHIL # BLD AUTO: 0.32 K/UL — SIGNIFICANT CHANGE UP (ref 0–0.7)
EOSINOPHIL NFR BLD AUTO: 5.6 % — SIGNIFICANT CHANGE UP (ref 0–8)
GLUCOSE SERPL-MCNC: 100 MG/DL — HIGH (ref 70–99)
HCT VFR BLD CALC: 36.8 % — LOW (ref 42–52)
HGB BLD-MCNC: 12.3 G/DL — LOW (ref 14–18)
IMM GRANULOCYTES NFR BLD AUTO: 0.9 % — HIGH (ref 0.1–0.3)
LYMPHOCYTES # BLD AUTO: 1.81 K/UL — SIGNIFICANT CHANGE UP (ref 1.2–3.4)
LYMPHOCYTES # BLD AUTO: 31.6 % — SIGNIFICANT CHANGE UP (ref 20.5–51.1)
MAGNESIUM SERPL-MCNC: 2.1 MG/DL — SIGNIFICANT CHANGE UP (ref 1.8–2.4)
MCHC RBC-ENTMCNC: 29.8 PG — SIGNIFICANT CHANGE UP (ref 27–31)
MCHC RBC-ENTMCNC: 33.4 G/DL — SIGNIFICANT CHANGE UP (ref 32–37)
MCV RBC AUTO: 89.1 FL — SIGNIFICANT CHANGE UP (ref 80–94)
MONOCYTES # BLD AUTO: 0.58 K/UL — SIGNIFICANT CHANGE UP (ref 0.1–0.6)
MONOCYTES NFR BLD AUTO: 10.1 % — HIGH (ref 1.7–9.3)
NEUTROPHILS # BLD AUTO: 2.93 K/UL — SIGNIFICANT CHANGE UP (ref 1.4–6.5)
NEUTROPHILS NFR BLD AUTO: 51.3 % — SIGNIFICANT CHANGE UP (ref 42.2–75.2)
NRBC # BLD: 0 /100 WBCS — SIGNIFICANT CHANGE UP (ref 0–0)
PLATELET # BLD AUTO: 255 K/UL — SIGNIFICANT CHANGE UP (ref 130–400)
PMV BLD: 10.2 FL — SIGNIFICANT CHANGE UP (ref 7.4–10.4)
POTASSIUM SERPL-MCNC: 4.5 MMOL/L — SIGNIFICANT CHANGE UP (ref 3.5–5)
POTASSIUM SERPL-SCNC: 4.5 MMOL/L — SIGNIFICANT CHANGE UP (ref 3.5–5)
PROT SERPL-MCNC: 6.3 G/DL — SIGNIFICANT CHANGE UP (ref 6–8)
RAPID RVP RESULT: SIGNIFICANT CHANGE UP
RBC # BLD: 4.13 M/UL — LOW (ref 4.7–6.1)
RBC # FLD: 13.9 % — SIGNIFICANT CHANGE UP (ref 11.5–14.5)
SARS-COV-2 RNA SPEC QL NAA+PROBE: SIGNIFICANT CHANGE UP
SODIUM SERPL-SCNC: 143 MMOL/L — SIGNIFICANT CHANGE UP (ref 135–146)
SPECIMEN SOURCE: SIGNIFICANT CHANGE UP
WBC # BLD: 5.72 K/UL — SIGNIFICANT CHANGE UP (ref 4.8–10.8)
WBC # FLD AUTO: 5.72 K/UL — SIGNIFICANT CHANGE UP (ref 4.8–10.8)

## 2024-09-14 PROCEDURE — 99232 SBSQ HOSP IP/OBS MODERATE 35: CPT

## 2024-09-14 RX ORDER — POLYETHYLENE GLYCOL 3350 17 G/17G
17 POWDER, FOR SOLUTION ORAL
Qty: 0 | Refills: 0 | DISCHARGE
Start: 2024-09-14

## 2024-09-14 RX ORDER — SODIUM PHOSPHATE, DIBASIC AND SODIUM PHOSPHATE, MONOBASIC 7; 19 G/230ML; G/230ML
1 ENEMA RECTAL
Refills: 0 | Status: DISCONTINUED | OUTPATIENT
Start: 2024-09-14 | End: 2024-09-16

## 2024-09-14 RX ORDER — SENNA 187 MG
2 TABLET ORAL
Qty: 0 | Refills: 0 | DISCHARGE
Start: 2024-09-14

## 2024-09-14 RX ORDER — LACTULOSE 10 G
15 PACKET (EA) ORAL
Qty: 0 | Refills: 0 | DISCHARGE
Start: 2024-09-14

## 2024-09-14 RX ADMIN — POVIDONE, PROPYLENE GLYCOL 1 DROP(S): 6.8; 3 LIQUID OPHTHALMIC at 21:39

## 2024-09-14 RX ADMIN — Medication 1 TABLET(S): at 17:51

## 2024-09-14 RX ADMIN — Medication 1 APPLICATION(S): at 06:20

## 2024-09-14 RX ADMIN — PHENOBARBITAL 64.8 MILLIGRAM(S): 15 TABLET ORAL at 12:22

## 2024-09-14 RX ADMIN — BACLOFEN 10 MILLIGRAM(S): 0.5 INJECTION INTRATHECAL at 12:22

## 2024-09-14 RX ADMIN — BACLOFEN 10 MILLIGRAM(S): 0.5 INJECTION INTRATHECAL at 23:36

## 2024-09-14 RX ADMIN — Medication 1 TABLET(S): at 06:27

## 2024-09-14 RX ADMIN — Medication 40 MILLIGRAM(S): at 06:20

## 2024-09-14 RX ADMIN — Medication 500 MILLIGRAM(S): at 17:54

## 2024-09-14 RX ADMIN — POLYETHYLENE GLYCOL 3350 17 GRAM(S): 17 POWDER, FOR SOLUTION ORAL at 06:19

## 2024-09-14 RX ADMIN — POVIDONE, PROPYLENE GLYCOL 1 DROP(S): 6.8; 3 LIQUID OPHTHALMIC at 06:20

## 2024-09-14 RX ADMIN — BACLOFEN 10 MILLIGRAM(S): 0.5 INJECTION INTRATHECAL at 00:12

## 2024-09-14 RX ADMIN — ENOXAPARIN SODIUM 40 MILLIGRAM(S): 100 INJECTION SUBCUTANEOUS at 17:56

## 2024-09-14 RX ADMIN — Medication 500 MILLIGRAM(S): at 06:19

## 2024-09-14 RX ADMIN — Medication 10 GRAM(S): at 12:22

## 2024-09-14 RX ADMIN — BACLOFEN 10 MILLIGRAM(S): 0.5 INJECTION INTRATHECAL at 06:20

## 2024-09-14 RX ADMIN — TAMSULOSIN HYDROCHLORIDE 0.4 MILLIGRAM(S): 0.4 CAPSULE ORAL at 21:39

## 2024-09-14 RX ADMIN — BACLOFEN 10 MILLIGRAM(S): 0.5 INJECTION INTRATHECAL at 17:53

## 2024-09-14 RX ADMIN — POLYETHYLENE GLYCOL 3350 17 GRAM(S): 17 POWDER, FOR SOLUTION ORAL at 17:53

## 2024-09-14 RX ADMIN — Medication 1 APPLICATION(S): at 17:50

## 2024-09-14 RX ADMIN — POVIDONE, PROPYLENE GLYCOL 1 DROP(S): 6.8; 3 LIQUID OPHTHALMIC at 13:43

## 2024-09-14 NOTE — DISCHARGE NOTE PROVIDER - HOSPITAL COURSE
68-year-old male with PMH of cerebral palsy , seizure disorder, BPH, suprapubic catheter, PEG tube, bed/wheelchair-bound at baseline presents to ED for evaluation of abdominal pain for2 days. As per  patient reports diffuse abdominal pain worse in his epigastrium that is sharp, constant, nonradiating, associated with sore throat and intermittent dry cough.      #Cystitis/Pyelonephritis   #S/P suprapubic cath   #Previously had multiple UTIs with PCNs and last was 5 days back with SPC   -In Ed on vitals BP was 90/50 improved on fluids , afebrile on RA , HR was 87   -on CTAP : Findings suspicious for urinary bladder urinary bladder infection with   left ascending urinary tract infection in the appropriate clinical   setting.  - UCx in 5/24 showed pseudomonas with S<0.25 for cipro , rcvd a course on previous admission   -On labs UA was done have small leukocytes esterases   - rcvd levofloxacin and 1l of LR in Ed   - Ciprofloxacin per ID recs    - 9/13: ID consulted -> recommended ciprofloxacin 500mg PO BID for 7 days    #Sore throat  #Dry cough  - chest xray: Low lung volumes with elevated left hemidiaphragm.  -RVP: negative    #S/P Peg tube   - c/w pureed diet, pt was on pureed diet in   - s/s eval done in last admission -> pt can tolerate moderately thick, pureed diet, pills through peg tube.     #Constipation  - belly distended   -CTA/P : Moderate distention of large bowel with gas and stool. Distal rectum   collapsed, without definite imaging evidence of sigmoid volvulus. Mild   wall thickening throughout rectum, raising possibility of proctitis.  -fleet enema Q4 hrs till BM   - c/w  miralax and lactulose  -KUB: Diffuse gaseous distention of the large bowel. No evidence for mechanical   obstruction. Gastrostomy catheter overlies stomach. Osseous structures are unchanged.  - Patient had 3 BM overnight  - c/w bowel regimen    #Seizures  #CP   - c/w baclofen  - c/w home phenobarbital     #BPH  - c/w home tamsulosin 68-year-old male with a past medical history of cerebral palsy, seizure disorder, BPH, suprapubic catheter, PEG tube, bed/wheelchair-bound at baseline presents to ED for evaluation of abdominal pain for 2 days. Patient reports diffuse abdominal pain worse in his epigastrium that is sharp, constant, nonradiating, associated with sore throat and intermittent dry cough.      #Cystitis/Pyelonephritis   #S/P suprapubic cath   #Previously had multiple UTIs with PCNs and last was 5 days back with SPC   -In Ed on vitals BP was 90/50 improved on fluids , afebrile on RA , HR was 87   -on CTAP : Findings suspicious for urinary bladder urinary bladder infection with   left ascending urinary tract infection in the appropriate clinical   setting.  - UCx in 5/24 showed pseudomonas with S<0.25 for cipro , rcvd a course on previous admission   -On labs UA was done have small leukocytes esterases   - rcvd levofloxacin and 1l of LR in Ed   - Ciprofloxacin per ID recs    - 9/13: ID consulted -> recommended ciprofloxacin 500mg PO BID for 7 days    #Sore throat  #Dry cough  - chest xray: Low lung volumes with elevated left hemidiaphragm.  -RVP: negative    #S/P Peg tube   - c/w pureed diet, pt was on pureed diet in   - s/s eval done in last admission -> pt can tolerate moderately thick, pureed diet, pills through peg tube.     #Constipation  - belly distended   -CTA/P : Moderate distention of large bowel with gas and stool. Distal rectum   collapsed, without definite imaging evidence of sigmoid volvulus. Mild   wall thickening throughout rectum, raising possibility of proctitis.  -fleet enema Q4 hrs till BM   - c/w  miralax and lactulose  -KUB: Diffuse gaseous distention of the large bowel. No evidence for mechanical   obstruction. Gastrostomy catheter overlies stomach. Osseous structures are unchanged.  - Patient had 3 BM overnight  - c/w bowel regimen    #Seizures  #CP   - c/w baclofen  - c/w home phenobarbital     #BPH  - c/w home tamsulosin 68-year-old male with a past medical history of cerebral palsy, seizure disorder, BPH, suprapubic catheter, PEG tube, bed/wheelchair-bound at baseline presents to ED for evaluation of abdominal pain for 2 days. Patient reports diffuse abdominal pain worse in his epigastrium that is sharp, constant, non-radiating, associated with sore throat and intermittent dry cough.  He was recently admitted for 3 days for UTI and was treated with antibiotics.    In the ED, vitals were BP 90/50, afebrile, satting well on room air, HR 87. Labs were significant for a UA that showed small leukocyte esterase. CT abd pelvis was done and showed moderate distension of large bowel with gas and stool and mild wall thickening throughout rectum, possibly proctitis. Urinary bladder findings were suspicious for UTI. Patient received levofloxacin and 1L LR. Admitted for cystitis workup and treatment.     On the floor, ID was following the patient and recommended starting ciprofloxacin 7 day course as previous urine cultures from prior admission grew pseudomonas and was treated with ciprofloxacin. A chest xray was done for the sore throat and dry cough and showed low lung volumes with elevated left hemidiaphragm. RVP was negative. Patient was treated with supportive management. For the constipation found on CT abd/pelvis, patient was started on a fleet enema q4hr until bowel movement and kept on a miralax, senna, and lactulose bowel regimen. Patient's symptoms of abdominal pain, sore throat, and cough resolved over the course of his hospital stay.    Patient is medically stable for discharge.

## 2024-09-14 NOTE — DISCHARGE NOTE PROVIDER - NSDCCPCAREPLAN_GEN_ALL_CORE_FT
PRINCIPAL DISCHARGE DIAGNOSIS  Diagnosis: Acute UTI  Assessment and Plan of Treatment: You were admitted for abdominal pain, found to have urinary infection. CT findings suspicious for urinary bladder urinary bladder infection with   left ascending urinary tract infection in the appropriate clinical   setting. urine cultures in 5/24 showed pseudomonas with S<0.25 for cipro. Infectious diseases were following, continue ciprofloxacin 500mg PO BID for 7 days.        SECONDARY DISCHARGE DIAGNOSES  Diagnosis: Constipation  Assessment and Plan of Treatment: You were found to have severe constipation, CT showing moderate distention of large bowel with gas and stool. Distal rectum   collapsed, without definite imaging evidence of sigmoid volvulus. Mild   wall thickening throughout rectum, raising possibility of proctitis. KUB showing diffuse gaseous distention of the large bowel. No evidence for mechanical obstruction. Started on aggressivve bowel regimen, and you were able to pass bowel movement. Continue taking miralax and senna.       PRINCIPAL DISCHARGE DIAGNOSIS  Diagnosis: Acute UTI  Assessment and Plan of Treatment: You were admitted for abdominal pain, found to have urinary infection on urinalysis. CAT scan findings confirmed a possible urinary tract infection. Your previous urine cultures in 5/24 grew a bacteria called pseudomonas that was treated with an antibiotic called ciprofloxacin. Infectious disease was following your case and recommended to continue ciprofloxacin 500mg PO BID for 7 days. You have 3 more days of the antibiotic left to finish.   - Please take all medications as prescribed  - Please follow up with your PCP within a week  - Please follow up with nephrology routinely.   - Please follow up with urology routinely  - If you notice any changes or start feeling worse, please return to the emergency deparment immediately.         SECONDARY DISCHARGE DIAGNOSES  Diagnosis: Constipation  Assessment and Plan of Treatment: You were found to have severe constipation on your CAT scan which showed a lot of stool in your colon. An xray of your abdomen confirmed the large amount of stool. You were started on an aggressivve bowel regimen, and you were able to pass bowel movement.   - Please take all medications as prescribed  - Please follow up with your PCP within a week  - Please follow up with gastroenterology routinely  - If you notice any changes or start feeling worse, please return to the emergency deparment immediately.

## 2024-09-14 NOTE — DISCHARGE NOTE PROVIDER - CARE PROVIDER_API CALL
Nathan Montalvo  Internal Medicine  [address] [place] [address]  Phone: [phone]  Fax: [phone]  Follow Up Time: 2 weeks   Nathan Montalvo  Internal Medicine  [address] [place] [address]  Phone: [phone]  Fax: [phone]  Follow Up Time: 2 weeks    Ramsey Nunn  Urology  [address] [place] [address]  Phone: [phone]  Fax: [phone]  Established Patient  Follow Up Time: Routine    Ayad Varela  Nephrology  [address] [place] [address]  Phone: [phone]  Fax: [phone]  Established Patient  Follow Up Time: Routine    Shannen Kidd  Gastroenterology  [address] [place] [address]  Phone: [phone]  Fax: [phone]  Established Patient  Follow Up Time: Routine

## 2024-09-14 NOTE — DISCHARGE NOTE PROVIDER - PROVIDER TOKENS
PROVIDER:[TOKEN:[[id number]],FOLLOWUP:[2 weeks]] PROVIDER:[TOKEN:[[id number]],FOLLOWUP:[2 weeks]],PROVIDER:[TOKEN:[[id number]],FOLLOWUP:[Routine],ESTABLISHEDPATIENT:[T]],PROVIDER:[TOKEN:[[id number]],FOLLOWUP:[Routine],ESTABLISHEDPATIENT:[T]],PROVIDER:[TOKEN:[[id number]],FOLLOWUP:[Routine],ESTABLISHEDPATIENT:[T]]

## 2024-09-14 NOTE — DISCHARGE NOTE PROVIDER - CARE PROVIDERS DIRECT ADDRESSES
,[email or web address]

## 2024-09-14 NOTE — DISCHARGE NOTE PROVIDER - NSDCFUADDAPPT_GEN_ALL_CORE_FT
- Please follow up with your PCP, Dr. Montalvo, within a week  - Please follow up with your urologist, Dr. Nunn, routinely  - Please follow up with your nephrologist, Dr. Varela, routinely  - Please follow up with your gastroenterologist, Mary Beth, routinely

## 2024-09-14 NOTE — DISCHARGE NOTE PROVIDER - NSDCMRMEDTOKEN_GEN_ALL_CORE_FT
Albuterol (Eqv-ProAir HFA) 90 mcg/inh inhalation aerosol: 2 puff(s) inhaled every 6 hours  ammonium lactate topical lotion: Apply topically to affected area 2 times a day to feet  baclofen 10 mg oral tablet: 1 tab(s) orally 4 times a day  ciprofloxacin 500 mg oral tablet: 1 tab(s) orally every 12 hours till 9/19/24  DermaPhor topical ointment: Apply topically to affected area once a day  docusate sodium 50 mg/5 mL oral solution: 30 milliliter(s) by gastrostomy tube once a day  guaifenesin-dextromethorphan 100 mg-10 mg/5 mL oral liquid: 10 milliliter(s) orally 3 times a day, As needed, Cough  lactulose 10 g/15 mL oral syrup: 15 milliliter(s) orally once a day  miconazole 2% topical powder: Apply topically to affected area 2 times a day  Multiple Vitamins oral liquid: 1 unspecified orally once a day 1 teaspoon via g tube daily  omeprazole 40 mg oral delayed release capsule: 1 cap(s) orally once a day  Oyster Shell 500 (1250 mg calcium carbonate) oral tablet: 1 tab(s) orally 2 times a day  PHENobarbital 64.8 mg oral tablet: 1 tab(s) orally once a day via G tube  polyethylene glycol 3350 oral powder for reconstitution: 17 gram(s) orally 2 times a day  Refresh ophthalmic solution: 1 drop(s) to each affected eye 3 times a day  senna leaf extract oral tablet: 2 tab(s) orally once a day (at bedtime)  tamsulosin 0.4 mg oral capsule: 1 cap(s) orally once a day (at bedtime)

## 2024-09-14 NOTE — DISCHARGE NOTE PROVIDER - DISCHARGE DATE
Quality 431: Preventive Care And Screening: Unhealthy Alcohol Use - Screening: Patient screened for unhealthy alcohol use using a single question and scores less than 2 times per year Quality 128: Preventive Care And Screening: Body Mass Index (Bmi) Screening And Follow-Up Plan: BMI is documented within normal parameters and no follow-up plan is required. Quality 226: Preventive Care And Screening: Tobacco Use: Screening And Cessation Intervention: Patient screened for tobacco use and is an ex/non-smoker Detail Level: Detailed Quality 130: Documentation Of Current Medications In The Medical Record: Current Medications Documented 14-Sep-2024 16-Sep-2024

## 2024-09-15 PROCEDURE — 99232 SBSQ HOSP IP/OBS MODERATE 35: CPT

## 2024-09-15 RX ORDER — DIPHENHYDRAMINE HCL 50 MG
25 CAPSULE ORAL ONCE
Refills: 0 | Status: COMPLETED | OUTPATIENT
Start: 2024-09-15 | End: 2024-09-15

## 2024-09-15 RX ADMIN — TAMSULOSIN HYDROCHLORIDE 0.4 MILLIGRAM(S): 0.4 CAPSULE ORAL at 21:33

## 2024-09-15 RX ADMIN — Medication 500 MILLIGRAM(S): at 17:35

## 2024-09-15 RX ADMIN — Medication 1 APPLICATION(S): at 05:28

## 2024-09-15 RX ADMIN — Medication 10 GRAM(S): at 11:51

## 2024-09-15 RX ADMIN — BACLOFEN 10 MILLIGRAM(S): 0.5 INJECTION INTRATHECAL at 05:27

## 2024-09-15 RX ADMIN — POVIDONE, PROPYLENE GLYCOL 1 DROP(S): 6.8; 3 LIQUID OPHTHALMIC at 13:09

## 2024-09-15 RX ADMIN — PHENOBARBITAL 64.8 MILLIGRAM(S): 15 TABLET ORAL at 12:27

## 2024-09-15 RX ADMIN — POLYETHYLENE GLYCOL 3350 17 GRAM(S): 17 POWDER, FOR SOLUTION ORAL at 05:28

## 2024-09-15 RX ADMIN — Medication 1 TABLET(S): at 05:27

## 2024-09-15 RX ADMIN — BACLOFEN 10 MILLIGRAM(S): 0.5 INJECTION INTRATHECAL at 11:51

## 2024-09-15 RX ADMIN — Medication 25 MILLIGRAM(S): at 01:33

## 2024-09-15 RX ADMIN — BACLOFEN 10 MILLIGRAM(S): 0.5 INJECTION INTRATHECAL at 17:35

## 2024-09-15 RX ADMIN — POVIDONE, PROPYLENE GLYCOL 1 DROP(S): 6.8; 3 LIQUID OPHTHALMIC at 21:33

## 2024-09-15 RX ADMIN — Medication 1 TABLET(S): at 17:48

## 2024-09-15 RX ADMIN — POVIDONE, PROPYLENE GLYCOL 1 DROP(S): 6.8; 3 LIQUID OPHTHALMIC at 05:28

## 2024-09-15 RX ADMIN — Medication 2 TABLET(S): at 21:33

## 2024-09-15 RX ADMIN — BACLOFEN 10 MILLIGRAM(S): 0.5 INJECTION INTRATHECAL at 23:15

## 2024-09-15 RX ADMIN — Medication 40 MILLIGRAM(S): at 05:27

## 2024-09-15 RX ADMIN — Medication 500 MILLIGRAM(S): at 05:28

## 2024-09-15 RX ADMIN — Medication 1 APPLICATION(S): at 17:35

## 2024-09-15 RX ADMIN — ENOXAPARIN SODIUM 40 MILLIGRAM(S): 100 INJECTION SUBCUTANEOUS at 18:29

## 2024-09-15 NOTE — CHART NOTE - NSCHARTNOTEFT_GEN_A_CORE
Consult received for "MST Score = />2." Upon chart review, patient with stable weight, does not currently meet criteria for Protein Calorie Malnutrition risk per MST. RD remains available for assessment per protocol or as needed.    Diet, Pureed (09-12-24 @ 22:56) [Active]    Per aide at bedside, pt with good PO and appetite PTA at group home. Pt on Pureed with mildly thick liquids at group home. Tolerates well.   Pt with good intake on admission, % intake of meals. Denies any n/v/d. BM 9/14 - normal. No edema/pressure injuries on admission.   Aide reports no wt changes.     Recommendations:  1. Please add mildly thick liquid modifier to diet order.     RD: Balwinder Juan [phone] or via TEAMS

## 2024-09-16 ENCOUNTER — TRANSCRIPTION ENCOUNTER (OUTPATIENT)
Age: 69
End: 2024-09-16

## 2024-09-16 VITALS
HEART RATE: 75 BPM | RESPIRATION RATE: 18 BRPM | SYSTOLIC BLOOD PRESSURE: 102 MMHG | DIASTOLIC BLOOD PRESSURE: 57 MMHG | TEMPERATURE: 99 F

## 2024-09-16 DIAGNOSIS — D84.9 IMMUNODEFICIENCY, UNSPECIFIED: ICD-10-CM

## 2024-09-16 DIAGNOSIS — Z93.1 GASTROSTOMY STATUS: ICD-10-CM

## 2024-09-16 DIAGNOSIS — T83.030A LEAKAGE OF CYSTOSTOMY CATHETER, INITIAL ENCOUNTER: ICD-10-CM
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
## 2024-09-16 DIAGNOSIS — J45.909 UNSPECIFIED ASTHMA, UNCOMPLICATED: ICD-10-CM
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
## 2024-09-16 DIAGNOSIS — R33.9 RETENTION OF URINE, UNSPECIFIED: ICD-10-CM

## 2024-09-16 DIAGNOSIS — N30.90 CYSTITIS, UNSPECIFIED WITHOUT HEMATURIA: ICD-10-CM

## 2024-09-16 DIAGNOSIS — M81.0 AGE-RELATED OSTEOPOROSIS WITHOUT CURRENT PATHOLOGICAL FRACTURE: ICD-10-CM

## 2024-09-16 DIAGNOSIS — Z99.3 DEPENDENCE ON WHEELCHAIR: ICD-10-CM

## 2024-09-16 DIAGNOSIS — G40.909 EPILEPSY, UNSPECIFIED, NOT INTRACTABLE, WITHOUT STATUS EPILEPTICUS: ICD-10-CM

## 2024-09-16 DIAGNOSIS — T83.510A INFECTION AND INFLAMMATORY REACTION DUE TO CYSTOSTOMY CATHETER, INITIAL ENCOUNTER: ICD-10-CM

## 2024-09-16 DIAGNOSIS — N40.0 BENIGN PROSTATIC HYPERPLASIA WITHOUT LOWER URINARY TRACT SYMPTOMS: ICD-10-CM

## 2024-09-16 DIAGNOSIS — Z87.442 PERSONAL HISTORY OF URINARY CALCULI: ICD-10-CM

## 2024-09-16 DIAGNOSIS — K59.00 CONSTIPATION, UNSPECIFIED: ICD-10-CM

## 2024-09-16 DIAGNOSIS — T83.090A OTHER MECHANICAL COMPLICATION OF CYSTOSTOMY CATHETER, INITIAL ENCOUNTER: ICD-10-CM

## 2024-09-16 LAB
ALBUMIN SERPL ELPH-MCNC: 3.5 G/DL — SIGNIFICANT CHANGE UP (ref 3.5–5.2)
ALP SERPL-CCNC: 69 U/L — SIGNIFICANT CHANGE UP (ref 30–115)
ALT FLD-CCNC: 12 U/L — SIGNIFICANT CHANGE UP (ref 0–41)
ANION GAP SERPL CALC-SCNC: 9 MMOL/L — SIGNIFICANT CHANGE UP (ref 7–14)
AST SERPL-CCNC: 16 U/L — SIGNIFICANT CHANGE UP (ref 0–41)
BASOPHILS # BLD AUTO: 0.02 K/UL — SIGNIFICANT CHANGE UP (ref 0–0.2)
BASOPHILS NFR BLD AUTO: 0.3 % — SIGNIFICANT CHANGE UP (ref 0–1)
BILIRUB SERPL-MCNC: <0.2 MG/DL — SIGNIFICANT CHANGE UP (ref 0.2–1.2)
BUN SERPL-MCNC: 16 MG/DL — SIGNIFICANT CHANGE UP (ref 10–20)
CALCIUM SERPL-MCNC: 9.2 MG/DL — SIGNIFICANT CHANGE UP (ref 8.4–10.5)
CHLORIDE SERPL-SCNC: 102 MMOL/L — SIGNIFICANT CHANGE UP (ref 98–110)
CO2 SERPL-SCNC: 27 MMOL/L — SIGNIFICANT CHANGE UP (ref 17–32)
CREAT SERPL-MCNC: 0.5 MG/DL — LOW (ref 0.7–1.5)
EGFR: 111 ML/MIN/1.73M2 — SIGNIFICANT CHANGE UP
EOSINOPHIL # BLD AUTO: 0.3 K/UL — SIGNIFICANT CHANGE UP (ref 0–0.7)
EOSINOPHIL NFR BLD AUTO: 4.4 % — SIGNIFICANT CHANGE UP (ref 0–8)
GLUCOSE BLDC GLUCOMTR-MCNC: 96 MG/DL — SIGNIFICANT CHANGE UP (ref 70–99)
GLUCOSE SERPL-MCNC: 108 MG/DL — HIGH (ref 70–99)
HCT VFR BLD CALC: 37.5 % — LOW (ref 42–52)
HGB BLD-MCNC: 12.6 G/DL — LOW (ref 14–18)
IMM GRANULOCYTES NFR BLD AUTO: 0.4 % — HIGH (ref 0.1–0.3)
LYMPHOCYTES # BLD AUTO: 1.47 K/UL — SIGNIFICANT CHANGE UP (ref 1.2–3.4)
LYMPHOCYTES # BLD AUTO: 21.5 % — SIGNIFICANT CHANGE UP (ref 20.5–51.1)
MAGNESIUM SERPL-MCNC: 1.9 MG/DL — SIGNIFICANT CHANGE UP (ref 1.8–2.4)
MCHC RBC-ENTMCNC: 30.1 PG — SIGNIFICANT CHANGE UP (ref 27–31)
MCHC RBC-ENTMCNC: 33.6 G/DL — SIGNIFICANT CHANGE UP (ref 32–37)
MCV RBC AUTO: 89.7 FL — SIGNIFICANT CHANGE UP (ref 80–94)
MONOCYTES # BLD AUTO: 0.54 K/UL — SIGNIFICANT CHANGE UP (ref 0.1–0.6)
MONOCYTES NFR BLD AUTO: 7.9 % — SIGNIFICANT CHANGE UP (ref 1.7–9.3)
NEUTROPHILS # BLD AUTO: 4.48 K/UL — SIGNIFICANT CHANGE UP (ref 1.4–6.5)
NEUTROPHILS NFR BLD AUTO: 65.5 % — SIGNIFICANT CHANGE UP (ref 42.2–75.2)
NRBC # BLD: 0 /100 WBCS — SIGNIFICANT CHANGE UP (ref 0–0)
PLATELET # BLD AUTO: 271 K/UL — SIGNIFICANT CHANGE UP (ref 130–400)
PMV BLD: 9.7 FL — SIGNIFICANT CHANGE UP (ref 7.4–10.4)
POTASSIUM SERPL-MCNC: 4 MMOL/L — SIGNIFICANT CHANGE UP (ref 3.5–5)
POTASSIUM SERPL-SCNC: 4 MMOL/L — SIGNIFICANT CHANGE UP (ref 3.5–5)
PROT SERPL-MCNC: 6 G/DL — SIGNIFICANT CHANGE UP (ref 6–8)
RBC # BLD: 4.18 M/UL — LOW (ref 4.7–6.1)
RBC # FLD: 13.9 % — SIGNIFICANT CHANGE UP (ref 11.5–14.5)
SODIUM SERPL-SCNC: 138 MMOL/L — SIGNIFICANT CHANGE UP (ref 135–146)
WBC # BLD: 6.84 K/UL — SIGNIFICANT CHANGE UP (ref 4.8–10.8)
WBC # FLD AUTO: 6.84 K/UL — SIGNIFICANT CHANGE UP (ref 4.8–10.8)

## 2024-09-16 PROCEDURE — 99238 HOSP IP/OBS DSCHRG MGMT 30/<: CPT

## 2024-09-16 RX ADMIN — Medication 1 TABLET(S): at 05:35

## 2024-09-16 RX ADMIN — BACLOFEN 10 MILLIGRAM(S): 0.5 INJECTION INTRATHECAL at 13:01

## 2024-09-16 RX ADMIN — Medication 10 GRAM(S): at 13:00

## 2024-09-16 RX ADMIN — BACLOFEN 10 MILLIGRAM(S): 0.5 INJECTION INTRATHECAL at 05:35

## 2024-09-16 RX ADMIN — Medication 40 MILLIGRAM(S): at 05:35

## 2024-09-16 RX ADMIN — POVIDONE, PROPYLENE GLYCOL 1 DROP(S): 6.8; 3 LIQUID OPHTHALMIC at 05:36

## 2024-09-16 RX ADMIN — POVIDONE, PROPYLENE GLYCOL 1 DROP(S): 6.8; 3 LIQUID OPHTHALMIC at 13:05

## 2024-09-16 RX ADMIN — POLYETHYLENE GLYCOL 3350 17 GRAM(S): 17 POWDER, FOR SOLUTION ORAL at 05:35

## 2024-09-16 RX ADMIN — Medication 1 APPLICATION(S): at 05:36

## 2024-09-16 RX ADMIN — PHENOBARBITAL 64.8 MILLIGRAM(S): 15 TABLET ORAL at 13:01

## 2024-09-16 RX ADMIN — Medication 500 MILLIGRAM(S): at 05:35

## 2024-09-16 NOTE — CHART NOTE - NSCHARTNOTEFT_GEN_A_CORE
Pt was seen and examined at the bedside. reports feeling better.  Pt to take 3 more days of Abx to complete course.

## 2024-09-16 NOTE — DISCHARGE NOTE NURSING/CASE MANAGEMENT/SOCIAL WORK - PATIENT PORTAL LINK FT
You can access the FollowMyHealth Patient Portal offered by [facility] by registering at the following website: [email or web address]. By joining "[email or web address]’s FollowMyHealth portal, you will also be able to view your health information using other applications (apps) compatible with our system.

## 2024-09-16 NOTE — PROGRESS NOTE ADULT - SUBJECTIVE AND OBJECTIVE BOX
SUBJECTIVE/OVERNIGHT EVENTS  Today is hospital day 1d. This morning patient was seen and examined at bedside, resting comfortably in bed. No acute or major events overnight.  Patient has intellectual disability but answers questions, denies any pain.      MEDICATIONS  STANDING MEDICATIONS  ammonium lactate 12% Lotion 1 Application(s) Topical two times a day  artificial  tears Solution 1 Drop(s) Both EYES three times a day  baclofen 10 milliGRAM(s) Oral every 6 hours  calcium carbonate   1250 mG (OsCal) 1 Tablet(s) Oral two times a day  ciprofloxacin     Tablet 500 milliGRAM(s) Oral every 12 hours  enoxaparin Injectable 40 milliGRAM(s) SubCutaneous every 24 hours  lactulose Syrup 10 Gram(s) Oral daily  mineral oil/petrolatum Hydrophilic Ointment 1 Application(s) Topical daily  pantoprazole    Tablet 40 milliGRAM(s) Oral before breakfast  PHENobarbital 64.8 milliGRAM(s) Oral daily  polyethylene glycol 3350 17 Gram(s) Oral two times a day  saline laxative (FLEET) Rectal Enema 1 Enema Rectal <User Schedule>  senna 2 Tablet(s) Oral at bedtime  tamsulosin 0.4 milliGRAM(s) Oral at bedtime    PRN MEDICATIONS  albuterol    90 MICROgram(s) HFA Inhaler 2 Puff(s) Inhalation every 6 hours PRN  guaifenesin/dextromethorphan Oral Liquid 10 milliLiter(s) Oral three times a day PRN    VITALS  T(F): 98.3 (09-13-24 @ 07:02), Max: 98.8 (09-12-24 @ 15:39)  HR: 84 (09-13-24 @ 07:02) (84 - 91)  BP: 137/74 (09-13-24 @ 07:02) (90/50 - 137/74)  RR: 18 (09-13-24 @ 07:02) (18 - 18)  SpO2: 97% (09-13-24 @ 07:02) (94% - 97%)    PHYSICAL EXAM  GENERAL  ( x ) NAD, lying in bed comfortably     (  ) obtunded     (  ) lethargic     (  ) somnolent    HEAD  ( x ) Atraumatic     (  ) hematoma     (  ) laceration (specify location:       )     NECK  ( x ) Supple     (  ) neck stiffness     (  ) nuchal rigidity     (  )  no JVD     (  ) JVD present ( -- cm)    HEART  Rate -->  ( x ) normal rate    (  ) bradycardic    (  ) tachycardic  Rhythm -->  ( x ) regular    (  ) regularly irregular    (  ) irregularly irregular  Murmurs -->  ( x ) normal s1/s2    (  ) systolic murmur    (  ) diastolic murmur    (  ) continuous murmur     (  ) S3 present    (  ) S4 present    LUNGS  ( x ) Unlabored respirations     (  ) tachypnea  ( x ) B/L air entry     (  ) decreased breath sounds in:  (location     )    (  ) no adventitious sound     (  ) crackles     (  ) wheezing      (  ) rhonchi      (specify location:       )  (  ) chest wall tenderness (specify location:       )    ABDOMEN  ( x ) Soft     (  ) tense   |   (  ) nondistended     (  ) distended   |   (  ) +BS     (  ) hypoactive bowel sounds     (  ) hyperactive bowel sounds  ( x ) nontender     (  ) RUQ tenderness     (  ) RLQ tenderness     (  ) LLQ tenderness     (  ) epigastric tenderness     (  ) diffuse tenderness  (  ) Splenomegaly      (  ) Hepatomegaly      (  ) Jaundice     (  ) ecchymosis     EXTREMITIES  ( x ) Normal     (  ) Rash     (  ) ecchymosis     (  ) varicose veins      (  ) pitting edema     (  ) non-pitting edema   (  ) ulceration     (  ) gangrene:     (location:     )    NERVOUS SYSTEM  ( x ) A&Ox3     (  ) confused     (  ) lethargic  CN II-XII:     (  ) Intact     (  ) focal deficits  (Specify:     )   Upper extremities:     (  ) strength X/5     (  ) focal deficit (specify:    )  Lower extremities:     (  ) strength  X/5    (  ) focal deficit (specify:    )    SKIN  (  ) No rashes or lesions     (  ) maculopapular rash     (  ) pustules     (  ) vesicles     (  ) ulcer     (  ) ecchymosis     (specify location:     )    (  ) Indwelling Edouard Catheter   Date insterted:    Reason (  ) Critical illness     (  ) urinary retention    (  ) Accurate Ins/Outs Monitoring     (  ) CMO patient        LABS             12.7   6.85  )-----------( 268      ( 09-13-24 @ 07:47 )             38.1     139  |  101  |  13  -------------------------<  94   09-13-24 @ 07:47  4.2  |  27  |  0.7    Ca      9.0     09-13-24 @ 07:47  Mg     2.1     09-13-24 @ 07:47    TPro  6.4  /  Alb  3.9  /  TBili  <0.2  /  DBili  x   /  AST  17  /  ALT  14  /  AlkPhos  80  /  GGT  x     09-13-24 @ 07:47        Urinalysis Basic - ( 13 Sep 2024 07:47 )    Color: x / Appearance: x / SG: x / pH: x  Gluc: 94 mg/dL / Ketone: x  / Bili: x / Urobili: x   Blood: x / Protein: x / Nitrite: x   Leuk Esterase: x / RBC: x / WBC x   Sq Epi: x / Non Sq Epi: x / Bacteria: x          IMAGING
SUBJECTIVE/OVERNIGHT EVENTS  Today is hospital day 4d. This morning patient was seen and examined at bedside, resting comfortably in bed. No acute or major events overnight. Patient has no complaints at this time. Denies fever, chills, nausea, vomiting, diarrhea, constipation, hematochezia, dysuria, hematuria, chest pain, palpitations, sob.    CODE STATUS: FULL    MEDICATIONS  STANDING MEDICATIONS  ammonium lactate 12% Lotion 1 Application(s) Topical two times a day  artificial  tears Solution 1 Drop(s) Both EYES three times a day  baclofen 10 milliGRAM(s) Oral every 6 hours  calcium carbonate   1250 mG (OsCal) 1 Tablet(s) Oral two times a day  ciprofloxacin     Tablet 500 milliGRAM(s) Oral every 12 hours  enoxaparin Injectable 40 milliGRAM(s) SubCutaneous every 24 hours  lactulose Syrup 10 Gram(s) Oral daily  mineral oil/petrolatum Hydrophilic Ointment 1 Application(s) Topical daily  pantoprazole    Tablet 40 milliGRAM(s) Oral before breakfast  PHENobarbital 64.8 milliGRAM(s) Oral daily  polyethylene glycol 3350 17 Gram(s) Oral two times a day  senna 2 Tablet(s) Oral at bedtime  tamsulosin 0.4 milliGRAM(s) Oral at bedtime    PRN MEDICATIONS  albuterol    90 MICROgram(s) HFA Inhaler 2 Puff(s) Inhalation every 6 hours PRN  guaifenesin/dextromethorphan Oral Liquid 10 milliLiter(s) Oral three times a day PRN  saline laxative (FLEET) Rectal Enema 1 Enema Rectal <User Schedule> PRN    VITALS  T(F): 97.9 (09-16-24 @ 05:00), Max: 99.1 (09-15-24 @ 19:52)  HR: 69 (09-16-24 @ 05:00) (69 - 74)  BP: 109/71 (09-16-24 @ 05:00) (109/71 - 124/73)  RR: 18 (09-16-24 @ 05:00) (18 - 18)  SpO2: 97% (09-16-24 @ 07:45) (95% - 97%)  POCT Blood Glucose.: 96 mg/dL (09-16-24 @ 07:31)    PHYSICAL EXAM  GENERAL  ( X ) NAD, lying in bed comfortably     (  ) obtunded     (  ) lethargic     (  ) somnolent    HEAD  ( X ) Atraumatic     (  ) hematoma     (  ) laceration (specify location:       )     NECK  ( X ) Supple     (  ) neck stiffness     (  ) nuchal rigidity     (  )  no JVD     (  ) JVD present ( -- cm)    HEART  Rate -->  ( X ) normal rate    (  ) bradycardic    (  ) tachycardic  Rhythm -->  ( X ) regular    (  ) regularly irregular    (  ) irregularly irregular  Murmurs -->  ( X ) normal s1/s2    (  ) systolic murmur    (  ) diastolic murmur    (  ) continuous murmur     (  ) S3 present    (  ) S4 present    LUNGS  ( X )Unlabored respirations     (  ) tachypnea  ( X ) B/L air entry     (  ) decreased breath sounds in:  (location     )    (  ) no adventitious sound     (  ) crackles     (  ) wheezing      (  ) rhonchi      (specify location:       )  (  ) chest wall tenderness (specify location:       )    ABDOMEN  ( X ) Soft     (  ) tense   |   ( X ) nondistended     (  ) distended   |   ( X ) +BS     (  ) hypoactive bowel sounds     (  ) hyperactive bowel sounds  ( X ) nontender     (  ) RUQ tenderness     (  ) RLQ tenderness     (  ) LLQ tenderness     (  ) epigastric tenderness     (  ) diffuse tenderness  (  ) Splenomegaly      (  ) Hepatomegaly      (  ) Jaundice     (  ) ecchymosis  ( X ) PEG tube is clean, dry, and intact    EXTREMITIES  ( X ) Normal     (  ) Rash     (  ) ecchymosis     (  ) varicose veins      (  ) pitting edema     (  ) non-pitting edema   (  ) ulceration     (  ) gangrene:     (location:     )  ( X ) upper extremities contracted    NERVOUS SYSTEM  ( X ) A&Ox3     (  ) confused     (  ) lethargic  CN II-XII:     (  ) Intact     (  ) focal deficits  (Specify:     )   Upper extremities:     (  ) strength X/5     (  ) focal deficit (specify:    )  Lower extremities:     (  ) strength  X/5    (  ) focal deficit (specify:    )    SKIN  ( X ) No rashes or lesions     (  ) maculopapular rash     (  ) pustules     (  ) vesicles     (  ) ulcer     (  ) ecchymosis     (specify location:     )    
HAWATISH SCHUSTER  68y, Male  Allergy: No Known Allergies    Hospital Day: 2d    Patient seen and examined earlier today.  No acute events overnight.     PMH/PSH:  PAST MEDICAL & SURGICAL HISTORY:  BPH (benign prostatic hyperplasia)      Cerebral palsy      Seizure  last seizure >10 years ago      Osteoporosis      Spastic quadriplegia      Urinary calculi      Urinary retention      Asthma      S/P percutaneous endoscopic gastrostomy (PEG) tube placement      H/O cystoscopy      Suprapubic catheter          LAST 24-Hr EVENTS:    VITALS:  T(F): 98.7 (09-14-24 @ 13:40), Max: 98.7 (09-14-24 @ 13:40)  HR: 90 (09-14-24 @ 13:40)  BP: 131/71 (09-14-24 @ 13:40) (112/64 - 136/79)  RR: 18 (09-14-24 @ 13:40)  SpO2: 91% (09-14-24 @ 13:40)          TESTS & MEASUREMENTS:  Weight/BMI                            12.3   5.72  )-----------( 255      ( 14 Sep 2024 07:12 )             36.8         09-14    143  |  105  |  18  ----------------------------<  100<H>  4.5   |  24  |  0.7    Ca    8.9      14 Sep 2024 07:12  Mg     2.1     09-14    TPro  6.3  /  Alb  3.8  /  TBili  <0.2  /  DBili  x   /  AST  19  /  ALT  13  /  AlkPhos  76  09-14    LIVER FUNCTIONS - ( 14 Sep 2024 07:12 )  Alb: 3.8 g/dL / Pro: 6.3 g/dL / ALK PHOS: 76 U/L / ALT: 13 U/L / AST: 19 U/L / GGT: x                 Culture - Urine (collected 09-12-24 @ 19:54)  Source: Clean Catch Clean Catch (Midstream)  Final Report (09-14-24 @ 00:35):    No growth    Culture - Blood (collected 09-12-24 @ 19:48)  Source: .Blood Blood  Preliminary Report (09-14-24 @ 02:02):    No growth at 24 hours    Culture - Blood (collected 09-12-24 @ 19:48)  Source: .Blood Blood-Arterial  Preliminary Report (09-14-24 @ 02:02):    No growth at 24 hours      Urinalysis Basic - ( 14 Sep 2024 07:12 )    Color: x / Appearance: x / SG: x / pH: x  Gluc: 100 mg/dL / Ketone: x  / Bili: x / Urobili: x   Blood: x / Protein: x / Nitrite: x   Leuk Esterase: x / RBC: x / WBC x   Sq Epi: x / Non Sq Epi: x / Bacteria: x                    A1C with Estimated Average Glucose Result: 5.6 % (09-07-24 @ 11:28)          RADIOLOGY, ECG, & ADDITIONAL TESTS:  12 Lead ECG:   Ventricular Rate 105 BPM    Atrial Rate 105 BPM    P-R Interval 152 ms    QRS Duration 78 ms    Q-T Interval 304 ms    QTC Calculation(Bazett) 401 ms    P Axis 39 degrees    R Axis 2 degrees    T Axis 214 degrees    Diagnosis Line Sinus tachycardia  Left ventricular hypertrophy with repolarization abnormality ( R in aVL ,   Romhilt-Hunt )  Abnormal ECG    Confirmed by RADHA JERNIGAN MD ([id number]) on 5/26/2024 6:09:22 AM (05-26-24 @ 05:14)      RECENT DIAGNOSTIC ORDERS:      MEDICATIONS:  MEDICATIONS  (STANDING):  ammonium lactate 12% Lotion 1 Application(s) Topical two times a day  artificial  tears Solution 1 Drop(s) Both EYES three times a day  baclofen 10 milliGRAM(s) Oral every 6 hours  calcium carbonate   1250 mG (OsCal) 1 Tablet(s) Oral two times a day  ciprofloxacin     Tablet 500 milliGRAM(s) Oral every 12 hours  enoxaparin Injectable 40 milliGRAM(s) SubCutaneous every 24 hours  lactulose Syrup 10 Gram(s) Oral daily  mineral oil/petrolatum Hydrophilic Ointment 1 Application(s) Topical daily  pantoprazole    Tablet 40 milliGRAM(s) Oral before breakfast  PHENobarbital 64.8 milliGRAM(s) Oral daily  polyethylene glycol 3350 17 Gram(s) Oral two times a day  senna 2 Tablet(s) Oral at bedtime  tamsulosin 0.4 milliGRAM(s) Oral at bedtime    MEDICATIONS  (PRN):  albuterol    90 MICROgram(s) HFA Inhaler 2 Puff(s) Inhalation every 6 hours PRN Bronchospasm  guaifenesin/dextromethorphan Oral Liquid 10 milliLiter(s) Oral three times a day PRN Cough  saline laxative (FLEET) Rectal Enema 1 Enema Rectal <User Schedule> PRN constipation      HOME MEDICATIONS:  Albuterol (Eqv-ProAir HFA) 90 mcg/inh inhalation aerosol (09-12)  ammonium lactate topical lotion (09-12)  baclofen 10 mg oral tablet (09-12)  ciprofloxacin 500 mg oral tablet (09-14)  DermaPhor topical ointment (09-12)  docusate sodium 50 mg/5 mL oral solution (09-12)  guaifenesin-dextromethorphan 100 mg-10 mg/5 mL oral liquid (09-12)  lactulose 10 g/15 mL oral syrup (09-14)  miconazole 2% topical powder (09-12)  Multiple Vitamins oral liquid (09-12)  omeprazole 40 mg oral delayed release capsule (09-12)  Oyster Shell 500 (1250 mg calcium carbonate) oral tablet (09-12)  PHENobarbital 64.8 mg oral tablet (09-12)  polyethylene glycol 3350 oral powder for reconstitution (09-14)  Refresh ophthalmic solution (09-12)  senna leaf extract oral tablet (09-14)  tamsulosin 0.4 mg oral capsule (09-12)      PHYSICAL EXAM:  GENERAL: NAD  HEAD:  Atraumatic, Normocephalic  EYES: conjunctiva and sclera clear  NECK: Supple  CHEST/LUNG: Non labored respirations  HEART: regular rate and rhythm   ABDOMEN: Soft, Nondistended, NT  EXTREMITIES:  No clubbing, cyanosis, or edema  PSYCH: AAOx3  NEUROLOGY:   SKIN: No rashes or lesions        
TISH SHAIKH  68y, Male  Allergy: No Known Allergies    Hospital Day: 3d    Patient seen and examined earlier today.  No acute events overnight.     PMH/PSH:  PAST MEDICAL & SURGICAL HISTORY:  BPH (benign prostatic hyperplasia)      Cerebral palsy      Seizure  last seizure >10 years ago      Osteoporosis      Spastic quadriplegia      Urinary calculi      Urinary retention      Asthma      S/P percutaneous endoscopic gastrostomy (PEG) tube placement      H/O cystoscopy      Suprapubic catheter          LAST 24-Hr EVENTS:    VITALS:  T(F): 98.4 (09-15-24 @ 13:43), Max: 98.8 (09-15-24 @ 05:00)  HR: 71 (09-15-24 @ 13:43)  BP: 124/73 (09-15-24 @ 13:43) (102/57 - 124/73)  RR: 18 (09-15-24 @ 13:43)  SpO2: 95% (09-15-24 @ 13:43)          TESTS & MEASUREMENTS:  Weight/BMI      09-14-24 @ 07:01  -  09-15-24 @ 07:00  --------------------------------------------------------  IN: 0 mL / OUT: 300 mL / NET: -300 mL                            12.3   5.72  )-----------( 255      ( 14 Sep 2024 07:12 )             36.8         09-14    143  |  105  |  18  ----------------------------<  100<H>  4.5   |  24  |  0.7    Ca    8.9      14 Sep 2024 07:12  Mg     2.1     09-14    TPro  6.3  /  Alb  3.8  /  TBili  <0.2  /  DBili  x   /  AST  19  /  ALT  13  /  AlkPhos  76  09-14    LIVER FUNCTIONS - ( 14 Sep 2024 07:12 )  Alb: 3.8 g/dL / Pro: 6.3 g/dL / ALK PHOS: 76 U/L / ALT: 13 U/L / AST: 19 U/L / GGT: x                 Culture - Urine (collected 09-12-24 @ 19:54)  Source: Clean Catch Clean Catch (Midstream)  Final Report (09-14-24 @ 00:35):    No growth    Culture - Blood (collected 09-12-24 @ 19:48)  Source: .Blood Blood  Preliminary Report (09-15-24 @ 02:01):    No growth at 48 Hours    Culture - Blood (collected 09-12-24 @ 19:48)  Source: .Blood Blood-Arterial  Preliminary Report (09-15-24 @ 02:01):    No growth at 48 Hours      Urinalysis Basic - ( 14 Sep 2024 07:12 )    Color: x / Appearance: x / SG: x / pH: x  Gluc: 100 mg/dL / Ketone: x  / Bili: x / Urobili: x   Blood: x / Protein: x / Nitrite: x   Leuk Esterase: x / RBC: x / WBC x   Sq Epi: x / Non Sq Epi: x / Bacteria: x                    A1C with Estimated Average Glucose Result: 5.6 % (09-07-24 @ 11:28)          RADIOLOGY, ECG, & ADDITIONAL TESTS:  12 Lead ECG:   Ventricular Rate 105 BPM    Atrial Rate 105 BPM    P-R Interval 152 ms    QRS Duration 78 ms    Q-T Interval 304 ms    QTC Calculation(Bazett) 401 ms    P Axis 39 degrees    R Axis 2 degrees    T Axis 214 degrees    Diagnosis Line Sinus tachycardia  Left ventricular hypertrophy with repolarization abnormality ( R in aVL ,   Romhilt-Hunt )  Abnormal ECG    Confirmed by RADHA JERNIGAN MD ([id number]) on 5/26/2024 6:09:22 AM (05-26-24 @ 05:14)      RECENT DIAGNOSTIC ORDERS:  Diet, Pureed:   Mildly Thick Liquids (MILDTHICKLIQS) (09-15-24 @ 12:01)      MEDICATIONS:  MEDICATIONS  (STANDING):  ammonium lactate 12% Lotion 1 Application(s) Topical two times a day  artificial  tears Solution 1 Drop(s) Both EYES three times a day  baclofen 10 milliGRAM(s) Oral every 6 hours  calcium carbonate   1250 mG (OsCal) 1 Tablet(s) Oral two times a day  ciprofloxacin     Tablet 500 milliGRAM(s) Oral every 12 hours  enoxaparin Injectable 40 milliGRAM(s) SubCutaneous every 24 hours  lactulose Syrup 10 Gram(s) Oral daily  mineral oil/petrolatum Hydrophilic Ointment 1 Application(s) Topical daily  pantoprazole    Tablet 40 milliGRAM(s) Oral before breakfast  PHENobarbital 64.8 milliGRAM(s) Oral daily  polyethylene glycol 3350 17 Gram(s) Oral two times a day  senna 2 Tablet(s) Oral at bedtime  tamsulosin 0.4 milliGRAM(s) Oral at bedtime    MEDICATIONS  (PRN):  albuterol    90 MICROgram(s) HFA Inhaler 2 Puff(s) Inhalation every 6 hours PRN Bronchospasm  guaifenesin/dextromethorphan Oral Liquid 10 milliLiter(s) Oral three times a day PRN Cough  saline laxative (FLEET) Rectal Enema 1 Enema Rectal <User Schedule> PRN constipation      HOME MEDICATIONS:  Albuterol (Eqv-ProAir HFA) 90 mcg/inh inhalation aerosol (09-12)  ammonium lactate topical lotion (09-12)  baclofen 10 mg oral tablet (09-12)  ciprofloxacin 500 mg oral tablet (09-14)  DermaPhor topical ointment (09-12)  docusate sodium 50 mg/5 mL oral solution (09-12)  guaifenesin-dextromethorphan 100 mg-10 mg/5 mL oral liquid (09-12)  lactulose 10 g/15 mL oral syrup (09-14)  miconazole 2% topical powder (09-12)  Multiple Vitamins oral liquid (09-12)  omeprazole 40 mg oral delayed release capsule (09-12)  Oyster Shell 500 (1250 mg calcium carbonate) oral tablet (09-12)  PHENobarbital 64.8 mg oral tablet (09-12)  polyethylene glycol 3350 oral powder for reconstitution (09-14)  Refresh ophthalmic solution (09-12)  senna leaf extract oral tablet (09-14)  tamsulosin 0.4 mg oral capsule (09-12)      PHYSICAL EXAM:  GENERAL: NAD  HEAD:  Atraumatic, Normocephalic  EYES: conjunctiva and sclera clear  NECK: Supple  CHEST/LUNG: Non labored respirations  HEART: regular rate and rhythm   ABDOMEN: Soft, Nondistended, NT  EXTREMITIES:  No clubbing, cyanosis, or edema  PSYCH: AAOx3  NEUROLOGY:   SKIN: No rashes or lesions

## 2024-09-16 NOTE — PROGRESS NOTE ADULT - ASSESSMENT
68-year-old male with PMH of cerebral palsy , seizure disorder, BPH, suprapubic catheter, PEG tube, bed/wheelchair-bound at baseline presents to ED for evaluation of abdominal pain x 2 days.   As per  patient reports diffuse abdominal pain worse in his epigastrium that is sharp, constant, nonradiating, associated with sore throat and intermittent dry cough.        #Cystitis/Pyelonephritis   #S/P suprapubic cath   #Previously had multiple UTIs with PCNs and last was 5 days back with SPC   -In Ed on vitals BP was 90/50 improved on fluids , afebrile on RA , HR was 87   -on CTAP : Findings suspicious for urinary bladder urinary bladder infection with   left ascending urinary tract infection in the appropriate clinical   setting.  - UCx in 5/24 showed pseudomonas with S<0.25 for cipro , rcvd a course on previous admission   -On labs UA was done have small leukocytes esterases   - rcvd levofloxacin and 1l of LR in Ed   - Ciprofloxacin per ID recs    - 9/13: ID consulted -> recommended ciprofloxacin 500mg PO BID for 7 days    #Sore throat  #Dry cough  -chest xray: Low lung volumes with elevated left hemidiaphragm.  -follow up RVP: negative    #S/P Peg tube   - c/w pureed diet, pt was on pureed diet in   - s/s eval done in last admission -> pt can tolerate moderately thick, pureed diet, pills through peg tube.     #Constipation  - belly distended   -CTA/P : Moderate distention of large bowel with gas and stool. Distal rectum   collapsed, without definite imaging evidence of sigmoid volvulus. Mild   wall thickening throughout rectum, raising possibility of proctitis.  -fleet enema Q4 hrs till BM   - c/w  miralax and lactulose  - monitor BMs    -KUB: Diffuse gaseous distention of the large bowel. No evidence for mechanical   obstruction. Gastrostomy catheter overlies stomach. Osseous structures are unchanged.    #Seizures  #CP   - c/w baclofen  - c/w home phenobarbital     #BPH  - c/w home tamsulosin     - Diet - Pureed  - DVT - lovenox subq  - GI proph: PPI .    D/w CM    Pendings: c/w abx . will need meeting with group home on Monday prior to Dc      
68-year-old male with PMH of cerebral palsy , seizure disorder, BPH, suprapubic catheter, PEG tube, bed/wheelchair-bound at baseline presents to ED for evaluation of abdominal pain x 2 days.   As per  patient reports diffuse abdominal pain worse in his epigastrium that is sharp, constant, nonradiating, associated with sore throat and intermittent dry cough.      #Cystitis   #S/P suprapubic cath   #Previously had multiple UTIs with PCNs and last was 5 days back with SPC   - In Ed on vitals BP was 90/50 improved on fluids , afebrile on RA , HR was 87   - on CTAP : Findings suspicious for urinary bladder urinary bladder infection with left ascending urinary tract infection in the appropriate clinical setting.  - UCx in 5/24 showed pseudomonas with S<0.25 for cipro , rcvd a course on previous admission   - On admission, UA was done have small leukocytes esterases   - rcvd levofloxacin and 1l of LR in Ed   - 9/13: ID consulted -> recommended ciprofloxacin 500mg PO BID for 7 days, today is day 4    #Sore throat  #Dry cough  - chest xray: Low lung volumes with elevated left hemidiaphragm.  - RVP: negative    #S/P Peg tube   - c/w pureed diet, pt was on pureed diet in   - s/s eval done in last admission -> pt can tolerate moderately thick, pureed diet, pills through peg tube.     #Constipation  - belly distended on admission  - CTA/P : Moderate distention of large bowel with gas and stool. Distal rectum collapsed, without definite imaging evidence of sigmoid volvulus. Mild wall thickening throughout rectum, raising possibility of proctitis.  - c/w miralax and lactulose  - monitor BMs    - KUB: Diffuse gaseous distention of the large bowel. No evidence for mechanical obstruction. Gastrostomy catheter overlies stomach. Osseous structures are unchanged.    #Seizures  #CP   - c/w baclofen  - c/w home phenobarbital     #BPH  - c/w home tamsulosin     #MISC  - Diet: Pureed  - DVT ppx: lovenox subq  - GI ppx: protonix    Pending: D/c
68-year-old male with PMH of cerebral palsy , seizure disorder, BPH, suprapubic catheter, PEG tube, bed/wheelchair-bound at baseline presents to ED for evaluation of abdominal pain x 2 days.   As per  patient reports diffuse abdominal pain worse in his epigastrium that is sharp, constant, nonradiating, associated with sore throat and intermittent dry cough.        #Cystitis/Pyelonephritis   #S/P suprapubic cath   #Previously had multiple UTIs with PCNs and last was 5 days back with SPC   -In Ed on vitals BP was 90/50 improved on fluids , afebrile on RA , HR was 87   -on CTAP : Findings suspicious for urinary bladder urinary bladder infection with   left ascending urinary tract infection in the appropriate clinical   setting.  - UCx in 5/24 showed pseudomonas with S<0.25 for cipro , rcvd a course on previous admission   -On labs UA was done have small leukocytes esterases   - rcvd levofloxacin and 1l of LR in Ed   - Ciprofloxacin per ID recs    - 9/13: ID consulted -> recommended ciprofloxacin 500mg PO BID for 7 days    #Sore throat  #Dry cough  -f/u chest xray: Low lung volumes with elevated left hemidiaphragm.  -follow up RVP: negative    #S/P Peg tube   - c/w pureed diet, pt was on pureed diet in   - s/s eval done in last admission -> pt can tolerate moderately thick, pureed diet, pills through peg tube.     #Constipation  - belly distended   -CTA/P : Moderate distention of large bowel with gas and stool. Distal rectum   collapsed, without definite imaging evidence of sigmoid volvulus. Mild   wall thickening throughout rectum, raising possibility of proctitis.  -fleet enema Q4 hrs till BM   - c/w  miralax and lactulose  - monitor BMs - patient had BM overnight  -KUB: Diffuse gaseous distention of the large bowel. No evidence for mechanical   obstruction. Gastrostomy catheter overlies stomach. Osseous structures are unchanged.    #Seizures  #CP   - c/w baclofen  - c/w home phenobarbital     #BPH  - c/w home tamsulosin     - Diet - Pureed  - DVT - lovenox subq  - GI proph: PPI .    D/w CM    Pendings: c/w abx . will need meeting with group home on Monday prior to Dc  
68-year-old male with PMH of cerebral palsy , seizure disorder, BPH, suprapubic catheter, PEG tube, bed/wheelchair-bound at baseline presents to ED for evaluation of abdominal pain x 2 days.   As per  patient reports diffuse abdominal pain worse in his epigastrium that is sharp, constant, nonradiating, associated with sore throat and intermittent dry cough.        #Working diagnosis: Cystitis   #S/P suprapubic cath   #Previously had multiple UTIs with PCNs and last was 5 days back with SPC   -In Ed on vitals BP was 90/50 improved on fluids , afebrile on RA , HR was 87   -on CTAP : Findings suspicious for urinary bladder urinary bladder infection with   left ascending urinary tract infection in the appropriate clinical   setting.  - UCx in 5/24 showed pseudomonas with S<0.25 for cipro , rcvd a course on previous admission   -On labs UA was done have small leukocytes esterases   - rcvd levofloxacin and 1l of LR in Ed   - patient started on levofloxacin  - 9/13: ID consulted -> recommended ciprofloxacin 500mg PO BID for 7 days    #Sore throat  #Dry cough  -f/u chest xray: pending reading  -follow up RVP: pending    #S/P Peg tube   - c/w pureed diet, pt was on pureed diet in   - s/s eval done in last admission -> pt can tolerate moderately thick, pureed diet, pills through peg tube.     #Constipation  - belly distended   -CTA/P : Moderate distention of large bowel with gas and stool. Distal rectum   collapsed, without definite imaging evidence of sigmoid volvulus. Mild   wall thickening throughout rectum, raising possibility of proctitis.  -fleet enema Q4 hrs till BM   - c/w  miralax and lactulose  - monitor BMs   -f/u KUB: pending reading    #Seizures  #CP   - c/w baclofen  - c/w home phenobarbital     #BPH  - c/w home tamsulosin     #MISC   - Diet - Pureed  - DVT - lovenox subq  - GI proph: PPI .  -ATT    Pendings: f/u BCx, UCx, KUB, c/w abx

## 2024-09-16 NOTE — DISCHARGE NOTE NURSING/CASE MANAGEMENT/SOCIAL WORK - NSDCPEFALRISK_GEN_ALL_CORE
For information on Fall & Injury Prevention, visit: [email or web address] OR  [email or web address] OR  https://www.cdc.gov/steadi/patient.html

## 2024-09-20 NOTE — H&P ADULT - NSHPPHYSICALEXAM_GEN_ALL_CORE
PHYSICAL EXAM:  GENERAL: No acute distress, well-developed  HEAD:  Atraumatic, Normocephalic  EYES: EOMI, PERRLA, conjunctiva and sclera clear  NECK: Supple, no lymphadenopathy, no JVD  CHEST/LUNG: CTAB; No wheezes, rales, or rhonchi  HEART: Regular rate and rhythm; No murmurs, rubs, or gallops  ABDOMEN: Soft, distended - tube in place - no pus drainage from P-tube -   suprapubic catheter with blood at tip   EXTREMITIES:  2+ peripheral pulses b/l, No clubbing, cyanosis, or edema  NEUROLOGY: A&O x 3, no new focal deficits- no new focal deficits
Name band;

## 2024-09-25 DIAGNOSIS — Z96.0 PRESENCE OF UROGENITAL IMPLANTS: ICD-10-CM

## 2024-09-25 DIAGNOSIS — N40.0 BENIGN PROSTATIC HYPERPLASIA WITHOUT LOWER URINARY TRACT SYMPTOMS: ICD-10-CM

## 2024-09-25 DIAGNOSIS — N30.00 ACUTE CYSTITIS WITHOUT HEMATURIA: ICD-10-CM

## 2024-09-25 DIAGNOSIS — Z87.442 PERSONAL HISTORY OF URINARY CALCULI: ICD-10-CM

## 2024-09-25 DIAGNOSIS — Z74.01 BED CONFINEMENT STATUS: ICD-10-CM

## 2024-09-25 DIAGNOSIS — G40.909 EPILEPSY, UNSPECIFIED, NOT INTRACTABLE, WITHOUT STATUS EPILEPTICUS: ICD-10-CM

## 2024-09-25 DIAGNOSIS — Z99.3 DEPENDENCE ON WHEELCHAIR: ICD-10-CM

## 2024-09-25 DIAGNOSIS — G80.0 SPASTIC QUADRIPLEGIC CEREBRAL PALSY: ICD-10-CM
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
## 2024-09-25 DIAGNOSIS — J02.9 ACUTE PHARYNGITIS, UNSPECIFIED: ICD-10-CM

## 2024-09-25 DIAGNOSIS — M81.0 AGE-RELATED OSTEOPOROSIS WITHOUT CURRENT PATHOLOGICAL FRACTURE: ICD-10-CM

## 2024-09-25 DIAGNOSIS — Z93.1 GASTROSTOMY STATUS: ICD-10-CM

## 2024-09-25 DIAGNOSIS — K59.00 CONSTIPATION, UNSPECIFIED: ICD-10-CM

## 2024-09-25 DIAGNOSIS — B96.5 PSEUDOMONAS (AERUGINOSA) (MALLEI) (PSEUDOMALLEI) AS THE CAUSE OF DISEASES CLASSIFIED ELSEWHERE: ICD-10-CM

## 2024-09-25 DIAGNOSIS — D84.9 IMMUNODEFICIENCY, UNSPECIFIED: ICD-10-CM

## 2024-09-25 DIAGNOSIS — K62.89 OTHER SPECIFIED DISEASES OF ANUS AND RECTUM: ICD-10-CM

## 2024-09-25 DIAGNOSIS — T83.518A INFECTION AND INFLAMMATORY REACTION DUE TO OTHER URINARY CATHETER, INITIAL ENCOUNTER: ICD-10-CM

## 2024-09-25 DIAGNOSIS — Z53.29 PROCEDURE AND TREATMENT NOT CARRIED OUT BECAUSE OF PATIENT'S DECISION FOR OTHER REASONS: ICD-10-CM

## 2024-09-25 DIAGNOSIS — J45.909 UNSPECIFIED ASTHMA, UNCOMPLICATED: ICD-10-CM

## 2024-10-16 ENCOUNTER — APPOINTMENT (OUTPATIENT)
Dept: UROLOGY | Facility: CLINIC | Age: 69
End: 2024-10-16
Payer: MEDICARE

## 2024-10-16 PROCEDURE — 51705 CHANGE OF BLADDER TUBE: CPT

## 2024-10-23 ENCOUNTER — OUTPATIENT (OUTPATIENT)
Dept: OUTPATIENT SERVICES | Facility: HOSPITAL | Age: 69
LOS: 1 days | End: 2024-10-23
Payer: MEDICAID

## 2024-10-23 DIAGNOSIS — Z98.890 OTHER SPECIFIED POSTPROCEDURAL STATES: Chronic | ICD-10-CM

## 2024-10-23 DIAGNOSIS — Z93.59 OTHER CYSTOSTOMY STATUS: Chronic | ICD-10-CM

## 2024-10-23 DIAGNOSIS — Z93.1 GASTROSTOMY STATUS: Chronic | ICD-10-CM

## 2024-10-23 DIAGNOSIS — Z01.20 ENCOUNTER FOR DENTAL EXAMINATION AND CLEANING WITHOUT ABNORMAL FINDINGS: ICD-10-CM

## 2024-10-23 PROCEDURE — D9997: CPT

## 2024-10-23 PROCEDURE — D1110: CPT

## 2024-10-23 PROCEDURE — D0120: CPT

## 2024-10-24 DIAGNOSIS — Z01.21 ENCOUNTER FOR DENTAL EXAMINATION AND CLEANING WITH ABNORMAL FINDINGS: ICD-10-CM

## 2024-11-14 ENCOUNTER — APPOINTMENT (OUTPATIENT)
Dept: UROLOGY | Facility: CLINIC | Age: 69
End: 2024-11-14
Payer: MEDICARE

## 2024-11-14 PROCEDURE — 51705 CHANGE OF BLADDER TUBE: CPT

## 2024-11-23 ENCOUNTER — INPATIENT (INPATIENT)
Facility: HOSPITAL | Age: 69
LOS: 3 days | Discharge: HOME | DRG: 690 | End: 2024-11-27
Attending: INTERNAL MEDICINE | Admitting: STUDENT IN AN ORGANIZED HEALTH CARE EDUCATION/TRAINING PROGRAM
Payer: MEDICARE

## 2024-11-23 VITALS
OXYGEN SATURATION: 94 % | DIASTOLIC BLOOD PRESSURE: 80 MMHG | SYSTOLIC BLOOD PRESSURE: 123 MMHG | RESPIRATION RATE: 20 BRPM | HEART RATE: 104 BPM | TEMPERATURE: 99 F

## 2024-11-23 DIAGNOSIS — Z93.1 GASTROSTOMY STATUS: Chronic | ICD-10-CM

## 2024-11-23 DIAGNOSIS — Z98.890 OTHER SPECIFIED POSTPROCEDURAL STATES: Chronic | ICD-10-CM

## 2024-11-23 DIAGNOSIS — Z93.59 OTHER CYSTOSTOMY STATUS: Chronic | ICD-10-CM

## 2024-11-23 DIAGNOSIS — N39.0 URINARY TRACT INFECTION, SITE NOT SPECIFIED: ICD-10-CM

## 2024-11-23 LAB
ALBUMIN SERPL ELPH-MCNC: 4 G/DL — SIGNIFICANT CHANGE UP (ref 3.5–5.2)
ALP SERPL-CCNC: 89 U/L — SIGNIFICANT CHANGE UP (ref 30–115)
ALT FLD-CCNC: 10 U/L — SIGNIFICANT CHANGE UP (ref 0–41)
ANION GAP SERPL CALC-SCNC: 13 MMOL/L — SIGNIFICANT CHANGE UP (ref 7–14)
APPEARANCE UR: ABNORMAL
AST SERPL-CCNC: 16 U/L — SIGNIFICANT CHANGE UP (ref 0–41)
BACTERIA # UR AUTO: ABNORMAL /HPF
BASOPHILS # BLD AUTO: 0.02 K/UL — SIGNIFICANT CHANGE UP (ref 0–0.2)
BASOPHILS NFR BLD AUTO: 0.1 % — SIGNIFICANT CHANGE UP (ref 0–1)
BILIRUB SERPL-MCNC: <0.2 MG/DL — SIGNIFICANT CHANGE UP (ref 0.2–1.2)
BILIRUB UR-MCNC: NEGATIVE — SIGNIFICANT CHANGE UP
BUN SERPL-MCNC: 13 MG/DL — SIGNIFICANT CHANGE UP (ref 10–20)
CALCIUM SERPL-MCNC: 9.6 MG/DL — SIGNIFICANT CHANGE UP (ref 8.4–10.4)
CAST: >63 /LPF — HIGH (ref 0–4)
CHLORIDE SERPL-SCNC: 99 MMOL/L — SIGNIFICANT CHANGE UP (ref 98–110)
CO2 SERPL-SCNC: 25 MMOL/L — SIGNIFICANT CHANGE UP (ref 17–32)
COLOR SPEC: YELLOW — SIGNIFICANT CHANGE UP
CREAT SERPL-MCNC: 0.6 MG/DL — LOW (ref 0.7–1.5)
DIFF PNL FLD: ABNORMAL
EGFR: 104 ML/MIN/1.73M2 — SIGNIFICANT CHANGE UP
EOSINOPHIL # BLD AUTO: 0.02 K/UL — SIGNIFICANT CHANGE UP (ref 0–0.7)
EOSINOPHIL NFR BLD AUTO: 0.1 % — SIGNIFICANT CHANGE UP (ref 0–8)
FLUAV AG NPH QL: SIGNIFICANT CHANGE UP
FLUBV AG NPH QL: SIGNIFICANT CHANGE UP
GAS PNL BLDV: SIGNIFICANT CHANGE UP
GLUCOSE SERPL-MCNC: 146 MG/DL — HIGH (ref 70–99)
GLUCOSE UR QL: NEGATIVE MG/DL — SIGNIFICANT CHANGE UP
HCT VFR BLD CALC: 40.4 % — LOW (ref 42–52)
HGB BLD-MCNC: 13.6 G/DL — LOW (ref 14–18)
IMM GRANULOCYTES NFR BLD AUTO: 0.3 % — SIGNIFICANT CHANGE UP (ref 0.1–0.3)
KETONES UR-MCNC: NEGATIVE MG/DL — SIGNIFICANT CHANGE UP
LACTATE SERPL-SCNC: 2.6 MMOL/L — HIGH (ref 0.7–2)
LEUKOCYTE ESTERASE UR-ACNC: ABNORMAL
LYMPHOCYTES # BLD AUTO: 0.66 K/UL — LOW (ref 1.2–3.4)
LYMPHOCYTES # BLD AUTO: 4.6 % — LOW (ref 20.5–51.1)
MCHC RBC-ENTMCNC: 29.8 PG — SIGNIFICANT CHANGE UP (ref 27–31)
MCHC RBC-ENTMCNC: 33.7 G/DL — SIGNIFICANT CHANGE UP (ref 32–37)
MCV RBC AUTO: 88.6 FL — SIGNIFICANT CHANGE UP (ref 80–94)
MONOCYTES # BLD AUTO: 1.08 K/UL — HIGH (ref 0.1–0.6)
MONOCYTES NFR BLD AUTO: 7.5 % — SIGNIFICANT CHANGE UP (ref 1.7–9.3)
NEUTROPHILS # BLD AUTO: 12.54 K/UL — HIGH (ref 1.4–6.5)
NEUTROPHILS NFR BLD AUTO: 87.4 % — HIGH (ref 42.2–75.2)
NITRITE UR-MCNC: NEGATIVE — SIGNIFICANT CHANGE UP
NRBC # BLD: 0 /100 WBCS — SIGNIFICANT CHANGE UP (ref 0–0)
PH UR: >=9 (ref 5–8)
PLATELET # BLD AUTO: 249 K/UL — SIGNIFICANT CHANGE UP (ref 130–400)
PMV BLD: 10.6 FL — HIGH (ref 7.4–10.4)
POTASSIUM SERPL-MCNC: 3.9 MMOL/L — SIGNIFICANT CHANGE UP (ref 3.5–5)
POTASSIUM SERPL-SCNC: 3.9 MMOL/L — SIGNIFICANT CHANGE UP (ref 3.5–5)
PROT SERPL-MCNC: 7 G/DL — SIGNIFICANT CHANGE UP (ref 6–8)
PROT UR-MCNC: 100 MG/DL
RBC # BLD: 4.56 M/UL — LOW (ref 4.7–6.1)
RBC # FLD: 13.2 % — SIGNIFICANT CHANGE UP (ref 11.5–14.5)
RBC CASTS # UR COMP ASSIST: 16 /HPF — HIGH (ref 0–4)
RSV RNA NPH QL NAA+NON-PROBE: SIGNIFICANT CHANGE UP
SARS-COV-2 RNA SPEC QL NAA+PROBE: SIGNIFICANT CHANGE UP
SODIUM SERPL-SCNC: 137 MMOL/L — SIGNIFICANT CHANGE UP (ref 135–146)
SP GR SPEC: 1.01 — SIGNIFICANT CHANGE UP (ref 1–1.03)
SQUAMOUS # UR AUTO: 2 /HPF — SIGNIFICANT CHANGE UP (ref 0–5)
TRI-PHOS CRY UR QL COMP ASSIST: PRESENT
UROBILINOGEN FLD QL: 0.2 MG/DL — SIGNIFICANT CHANGE UP (ref 0.2–1)
WBC # BLD: 14.36 K/UL — HIGH (ref 4.8–10.8)
WBC # FLD AUTO: 14.36 K/UL — HIGH (ref 4.8–10.8)
WBC UR QL: 52 /HPF — HIGH (ref 0–5)

## 2024-11-23 PROCEDURE — 80053 COMPREHEN METABOLIC PANEL: CPT

## 2024-11-23 PROCEDURE — 86038 ANTINUCLEAR ANTIBODIES: CPT

## 2024-11-23 PROCEDURE — 74176 CT ABD & PELVIS W/O CONTRAST: CPT | Mod: MC

## 2024-11-23 PROCEDURE — 87086 URINE CULTURE/COLONY COUNT: CPT

## 2024-11-23 PROCEDURE — 93005 ELECTROCARDIOGRAM TRACING: CPT

## 2024-11-23 PROCEDURE — 83605 ASSAY OF LACTIC ACID: CPT

## 2024-11-23 PROCEDURE — 81001 URINALYSIS AUTO W/SCOPE: CPT

## 2024-11-23 PROCEDURE — 71045 X-RAY EXAM CHEST 1 VIEW: CPT | Mod: 26

## 2024-11-23 PROCEDURE — 99285 EMERGENCY DEPT VISIT HI MDM: CPT | Mod: FS

## 2024-11-23 PROCEDURE — 87077 CULTURE AEROBIC IDENTIFY: CPT

## 2024-11-23 PROCEDURE — 82962 GLUCOSE BLOOD TEST: CPT

## 2024-11-23 PROCEDURE — 85027 COMPLETE CBC AUTOMATED: CPT

## 2024-11-23 PROCEDURE — 70491 CT SOFT TISSUE NECK W/DYE: CPT | Mod: 26,MC

## 2024-11-23 PROCEDURE — 85025 COMPLETE CBC W/AUTO DIFF WBC: CPT

## 2024-11-23 PROCEDURE — 36415 COLL VENOUS BLD VENIPUNCTURE: CPT

## 2024-11-23 PROCEDURE — 87186 SC STD MICRODIL/AGAR DIL: CPT

## 2024-11-23 RX ORDER — PHENOBARBITAL 16.2 MG/1
64.8 TABLET ORAL DAILY
Refills: 0 | Status: DISCONTINUED | OUTPATIENT
Start: 2024-11-23 | End: 2024-11-24

## 2024-11-23 RX ORDER — PANTOPRAZOLE SODIUM 40 MG/1
40 TABLET, DELAYED RELEASE ORAL DAILY
Refills: 0 | Status: DISCONTINUED | OUTPATIENT
Start: 2024-11-23 | End: 2024-11-24

## 2024-11-23 RX ORDER — BACLOFEN 100 %
10 POWDER (GRAM) MISCELLANEOUS EVERY 6 HOURS
Refills: 0 | Status: DISCONTINUED | OUTPATIENT
Start: 2024-11-23 | End: 2024-11-24

## 2024-11-23 RX ORDER — POLYETHYLENE GLYCOL 3350 17 G/17G
17 POWDER, FOR SOLUTION ORAL
Refills: 0 | Status: DISCONTINUED | OUTPATIENT
Start: 2024-11-23 | End: 2024-11-24

## 2024-11-23 RX ORDER — LACTULOSE 10 G/15ML
10 SOLUTION ORAL DAILY
Refills: 0 | Status: DISCONTINUED | OUTPATIENT
Start: 2024-11-23 | End: 2024-11-24

## 2024-11-23 RX ORDER — SENNOSIDES 8.6 MG
2 TABLET ORAL AT BEDTIME
Refills: 0 | Status: DISCONTINUED | OUTPATIENT
Start: 2024-11-23 | End: 2024-11-24

## 2024-11-23 RX ORDER — SODIUM CHLORIDE 9 MG/ML
1000 INJECTION, SOLUTION INTRAMUSCULAR; INTRAVENOUS; SUBCUTANEOUS ONCE
Refills: 0 | Status: COMPLETED | OUTPATIENT
Start: 2024-11-23 | End: 2024-11-23

## 2024-11-23 RX ORDER — HEPARIN SODIUM,PORCINE 1000/ML
5000 VIAL (ML) INJECTION EVERY 12 HOURS
Refills: 0 | Status: DISCONTINUED | OUTPATIENT
Start: 2024-11-23 | End: 2024-11-27

## 2024-11-23 RX ORDER — CEFTRIAXONE SODIUM 1 G
1000 VIAL (EA) INJECTION EVERY 24 HOURS
Refills: 0 | Status: DISCONTINUED | OUTPATIENT
Start: 2024-11-23 | End: 2024-11-24

## 2024-11-23 RX ORDER — ACETAMINOPHEN 500MG 500 MG/1
650 TABLET, COATED ORAL ONCE
Refills: 0 | Status: COMPLETED | OUTPATIENT
Start: 2024-11-23 | End: 2024-11-23

## 2024-11-23 RX ORDER — LACTULOSE 10 G/15ML
10 SOLUTION ORAL DAILY
Refills: 0 | Status: DISCONTINUED | OUTPATIENT
Start: 2024-11-23 | End: 2024-11-23

## 2024-11-23 RX ORDER — CEFTRIAXONE SODIUM 1 G
1000 VIAL (EA) INJECTION ONCE
Refills: 0 | Status: COMPLETED | OUTPATIENT
Start: 2024-11-23 | End: 2024-11-23

## 2024-11-23 RX ORDER — ACETAMINOPHEN 500MG 500 MG/1
650 TABLET, COATED ORAL EVERY 6 HOURS
Refills: 0 | Status: DISCONTINUED | OUTPATIENT
Start: 2024-11-23 | End: 2024-11-24

## 2024-11-23 RX ADMIN — ACETAMINOPHEN 500MG 650 MILLIGRAM(S): 500 TABLET, COATED ORAL at 17:39

## 2024-11-23 RX ADMIN — SODIUM CHLORIDE 1000 MILLILITER(S): 9 INJECTION, SOLUTION INTRAMUSCULAR; INTRAVENOUS; SUBCUTANEOUS at 21:00

## 2024-11-23 RX ADMIN — Medication 100 MILLIGRAM(S): at 21:00

## 2024-11-23 RX ADMIN — SODIUM CHLORIDE 1000 MILLILITER(S): 9 INJECTION, SOLUTION INTRAMUSCULAR; INTRAVENOUS; SUBCUTANEOUS at 16:33

## 2024-11-23 RX ADMIN — ACETAMINOPHEN 500MG 650 MILLIGRAM(S): 500 TABLET, COATED ORAL at 16:33

## 2024-11-23 NOTE — ED PROVIDER NOTE - INTERNATIONAL TRAVEL
Spoke to Norris from [facility] office at [facility] cardiac telemetry office. No pauses seen.Automatic transmission was sent at 2:52pm. Afib is seen with rates 100-105. Asked that patient call him to walk through monitoring.   Left message for daughter on machine that no pauses seen on monitor to account for fainting episode. Asked that she call Norris from the [facility] office to go over transmissions. Number given to call [phone].  Also left a message on patient home answering number with same information.    No

## 2024-11-23 NOTE — ED PROVIDER NOTE - PATIENT'S SEXUAL ORIENTATION
Post-Partum Day Number 2 Progress/Discharge Note Patient doing well post-partum without significant complaint. Voiding without difficulty, normal lochia, positive flatus. Vitals:  Patient Vitals for the past 8 hrs: 
 BP Temp Pulse Resp SpO2  
10/04/18 0715 104/72 97.5 °F (36.4 °C) 74 17 95 % Temp (24hrs), Av.8 °F (36.6 °C), Min:97.5 °F (36.4 °C), Max:98 °F (36.7 °C) Vital signs stable, afebrile. Exam:  Patient without distress. Abdomen soft, fundus firm at level of umbilicus, non tender Lower extremities are negative for swelling, cords or tenderness. Lab/Data Review: All lab results for the last 24 hours reviewed. Assessment and Plan:  Patient appears to be having uncomplicated post-partum course. Continue routine perineal care and maternal education. Plan discharge for today with follow up in our office in 6 weeks. Unknown

## 2024-11-23 NOTE — ED PROVIDER NOTE - OBJECTIVE STATEMENT
69-year-old male with history of cerebral palsy, seizure disorder, PEG, wheelchair-bound, suprapubic catheter presenting to ER with 1 day right-sided neck pain.  Patient states he felt like he "slept the wrong way."  Patient was given Tylenol this morning without relief.  He denies any trauma, injuries, headache, nausea, vomiting, fevers, chills, cough, URI symptoms, abdominal pain, diarrhea, urinary symptoms.

## 2024-11-23 NOTE — H&P ADULT - ATTENDING COMMENTS
69 -year-old male with history of cerebral palsy , seizure disorder, BPH, suprapubic catheter, PEG tube, bed/wheelchair-bound at baseline presents to ED for evaluation of neck pain that has been present since 2 days. No trauma, pain is mostly on the right side, worse with moving the neck, no fever, itching or headache, no radiation, In the ED vital signs were stable, he had temp 100.5, CT neck showed no fracture, UA was positive.     PHYSICAL EXAM:  GENERAL: NAD, well-developed.  HEAD:  Atraumatic, Normocephalic.  EYES: EOMI, PERRLA, conjunctiva and sclera clear.  NECK: Supple, right neck tenderness, no erythema,   CHEST/LUNG: Clear to auscultation bilaterally; No wheeze.  HEART: Regular rate and rhythm; S1 S2.   ABDOMEN: Soft, distended, tympanic, bowel sound reduced, suprapubic cath in place. PEG tube in place  EXTREMITIES:  upper extremities and contracted with weakness, LE are weak with muscle atrophy.   PSYCH: AAOx3.  NEUROLOGY: non-focal.  SKIN: No rashes or lesions.    A/P:   cervical Pain: right sided, likely muscular pain from chronic position.   Patient with fever, but no headache, his neck is rigid from cerebral palsy, meningitis is less likely as patient has no headache, looks ok,   CT neck showed Heterogeneous bulky enhancement of the posterior hypopharynx. Possibly an atypically prominent pharyngolaryngeal plexus   (normal variant)  Lidocaine patch, Baclofen, started on Diazepam for muscle relaxant effect.   Monitor for headache, if worsening fever or AMS sofya need LP.       UTI is patient with chronic suprapubic cath  Sepsis : leukocytosis and tachycardia.     patient received one liter of fluid bolus   received ceftriaxone 1000 mg once a day   RVP negative   UA showing large LE , many wbc and many bacteria   blood culture taken , urine culture pending   previous cultures showing pseudomonas Aeruginosa.   Switch to Cefepime.     Chronic colonic distention:   CT abdomen is pending but showed on my read colonic stool with colonic distention (seems stable)  Avoid Opiate,   Continue bowel regimen.       Seizure disorder: continue phenobarbital     DVT prophylaxis: Heparin SC.   #Progress Note Handoff:  Pending (specify):    Family discussion:  Disposition: Group

## 2024-11-23 NOTE — H&P ADULT - NSHPPHYSICALEXAM_GEN_ALL_CORE
PHYSICAL EXAM:      Constitutional:    Eyes:    ENMT:    Neck:    Breasts:    Back:    Respiratory:    Cardiovascular:    Gastrointestinal:    Genitourinary:    Rectal:    Extremities:    Vascular:    Neurological:    Skin:    Lymph Nodes:    Musculoskeletal:    Psychiatric: PHYSICAL EXAM:      Constitutional:Not in acute distress     Neck:spastic neck , no rigidity concerning for meningitis       Back:no point tenderness in the back     Respiratory:GBAE     Cardiovascular:regular s1 s2     Gastrointestinal: distended rigid abdomen , no guarding     Extremities:no lower extremity  edema     Neurological:alert awake and oriented     Musculoskeletal: contracted upper and lower extremity

## 2024-11-23 NOTE — ED ADULT NURSE NOTE - OBJECTIVE STATEMENT
pt states he has neck pain since this morning. pt states he has neck pain since this morning. Pt has a suprapubic cath. and PEG tube.

## 2024-11-23 NOTE — ED PROVIDER NOTE - CLINICAL SUMMARY MEDICAL DECISION MAKING FREE TEXT BOX
69-year-old here with right-sided neck pain fevers chills.  Labs CT chest x-ray urine done.  Urine infected.  Blood cultures drawn antibiotics started.    Patient reassessed feeling better.  Will continue antibiotics and admission.    I personally evaluated patient. I agree with the findings and plan with all documentation on chart except as documented  in my note.      ED work up reviewed and results and plan of care discussed with patient. Patient requires admission for further work up, monitoring, and management. Need for admission discussed with patient.

## 2024-11-23 NOTE — ED ADULT NURSE NOTE - NSFALLHARMRISKINTERV_ED_ALL_ED

## 2024-11-23 NOTE — H&P ADULT - NSHPLABSRESULTS_GEN_ALL_CORE
13.6   14.36 )-----------( 249      ( 2024 16:26 )             40.4     11-    137  |  99  |  13  ----------------------------<  146[H]  3.9   |  25  |  0.6[L]    Ca    9.6      2024 16:26    TPro  7.0  /  Alb  4.0  /  TBili  <0.2  /  DBili  x   /  AST  16  /  ALT  10  /  AlkPhos  89  11-          LIVER FUNCTIONS - ( 2024 16:26 )  Alb: 4.0 g/dL / Pro: 7.0 g/dL / ALK PHOS: 89 U/L / ALT: 10 U/L / AST: 16 U/L / GGT: x           Urinalysis Basic - ( 2024 17:34 )    Color: Yellow / Appearance: Turbid / S.015 / pH: x  Gluc: x / Ketone: Negative mg/dL  / Bili: Negative / Urobili: 0.2 mg/dL   Blood: x / Protein: 100 mg/dL / Nitrite: Negative   Leuk Esterase: Large / RBC: 16 /HPF / WBC 52 /HPF   Sq Epi: x / Non Sq Epi: 2 /HPF / Bacteria: Too Numerous to count /HPF       CT neck : 1. Heterogeneous bulky enhancement of the posterior hypopharynx.Possibly an atypically prominent atypically prominent pharyngolaryngeal plexus (normal variant)    2. Consider direct ENT visualization    3. Right orbital mass consistent with venous varix

## 2024-11-23 NOTE — ED PROVIDER NOTE - PHYSICAL EXAMINATION
CONSTITUTIONAL: Well-appearing; well-nourished; in no apparent distress.   EYES: PERRL; EOM intact.   ENT: normal nose; no rhinorrhea; normal pharynx with no tonsillar hypertrophy.   NECK: Supple; non-tender; no cervical lymphadenopathy.   CARDIOVASCULAR: + tachycardia; no murmurs, rubs, or gallops.   RESPIRATORY: Normal chest excursion with respiration; breath sounds clear and equal bilaterally; no wheezes, rhonchi, or rales.  GI/: Normal bowel sounds; non-distended; non-tender; no palpable organomegaly.   MS: No evidence of trauma or deformity. No midline spinal ttp. + ttp to rt cervical paraspinal region.  distal pulses are normal.   SKIN: Normal for age and race; warm; dry; good turgor; no apparent lesions or exudate.   NEURO/PSYCH: A & O x 4; grossly unremarkable. mood and manner are appropriate. No focal deficits

## 2024-11-23 NOTE — H&P ADULT - ASSESSMENT
this is the case of a 69 -year-old male with PMH of cerebral palsy , seizure disorder, BPH, suprapubic catheter, PEG tube, bed/wheelchair-bound at baseline presents to ED for evaluation of neck pain that has been present since 2 days.   had fever when in ED , found to have UTI   Clinically and HD stable    #UTI   #sepsis   WBC at 14 , lactate at 2.1 repeat lactate at 1   patient received one liter of fluid bolus   received ceftriaxone 1000 mg once a day   RVP negative   UA showing large LE , many wbc and many bacteria   blood culture taken , urine culture pending   previous cultures showing pseudomonas multisens   will continue on ceftriaxone   will stop tamsulosin since patient has suprapubic cath     #Abdominal pain   PAtient on multiple laxatives in the group home   distended abdomen painfull on deep palpation   will order CT of the abdomen to rule intra abdominal pathologies aw weel as to rule out pyelonephritis( in a setting of multiple UTI and suprapubic cath) and fecaloma       #neck pain   patient came for neck pain   CT neck showing : Heterogeneous bulky enhancement of the posterior hypopharynx.Possibly an atypically prominent atypically prominent pharyngolaryngeal plexus (normal variant)  will consult ENT   pain management with tyelnol for now   will continue baclofen at home dose     #seizure disorder  will continue home dose of phenobarbital     Will keep NPO untill CT abdomen results   PAtient when seen by speech was advised to be on pureed diet ( patient uses PEG and oral intake for feeding)     will start DVT proph by heparin 5000 U SC BID

## 2024-11-23 NOTE — H&P ADULT - HISTORY OF PRESENT ILLNESS
this is the case of a 69 -year-old male with PMH of cerebral palsy , seizure disorder, BPH, suprapubic catheter, PEG tube, bed/wheelchair-bound at baseline presents to ED for evaluation of   Denies any fever/chills, congestion, productivity of his cough, CP, SOB, vomiting, diarrhea, black or bloody stools, hematuria.    States he tolerates food by mouth but uses PEG tube for medications and has used it without difficulty.    in ED vitals were   HR : 104   BP : 123/80   temp : 38.1    Given one shot  of ceftriaxone 1g in the ED   patien       Of note: patient was admitted in september for a UTI and found to have pseudomonas in the URine and treated with ciprofloxacin      this is the case of a 69 -year-old male with PMH of cerebral palsy , seizure disorder, BPH, suprapubic catheter, PEG tube, bed/wheelchair-bound at baseline presents to ED for evaluation of neck pain that has been present since 2 days.   Denies any fever/chills, congestion, productivity of his cough, CP, SOB, vomiting, diarrhea, black or bloody stools, hematuria.    States he tolerates food by mouth but uses PEG tube for medications and has used it without difficulty.    in ED vitals were   HR : 104   BP : 123/80   temp : 38.1    Given one shot  of ceftriaxone 1g in the ED   patien       Of note: patient was admitted in september for a UTI and found to have pseudomonas in the URine and treated with ciprofloxacin

## 2024-11-24 LAB
ALBUMIN SERPL ELPH-MCNC: 3.8 G/DL — SIGNIFICANT CHANGE UP (ref 3.5–5.2)
ALP SERPL-CCNC: 80 U/L — SIGNIFICANT CHANGE UP (ref 30–115)
ALT FLD-CCNC: 8 U/L — SIGNIFICANT CHANGE UP (ref 0–41)
ANION GAP SERPL CALC-SCNC: 14 MMOL/L — SIGNIFICANT CHANGE UP (ref 7–14)
AST SERPL-CCNC: 13 U/L — SIGNIFICANT CHANGE UP (ref 0–41)
BASOPHILS # BLD AUTO: 0.02 K/UL — SIGNIFICANT CHANGE UP (ref 0–0.2)
BASOPHILS NFR BLD AUTO: 0.3 % — SIGNIFICANT CHANGE UP (ref 0–1)
BILIRUB SERPL-MCNC: 0.3 MG/DL — SIGNIFICANT CHANGE UP (ref 0.2–1.2)
BUN SERPL-MCNC: 11 MG/DL — SIGNIFICANT CHANGE UP (ref 10–20)
CALCIUM SERPL-MCNC: 9.4 MG/DL — SIGNIFICANT CHANGE UP (ref 8.4–10.5)
CHLORIDE SERPL-SCNC: 105 MMOL/L — SIGNIFICANT CHANGE UP (ref 98–110)
CO2 SERPL-SCNC: 25 MMOL/L — SIGNIFICANT CHANGE UP (ref 17–32)
CREAT SERPL-MCNC: 0.5 MG/DL — LOW (ref 0.7–1.5)
CULTURE RESULTS: SIGNIFICANT CHANGE UP
EGFR: 110 ML/MIN/1.73M2 — SIGNIFICANT CHANGE UP
EOSINOPHIL # BLD AUTO: 0.11 K/UL — SIGNIFICANT CHANGE UP (ref 0–0.7)
EOSINOPHIL NFR BLD AUTO: 1.7 % — SIGNIFICANT CHANGE UP (ref 0–8)
GLUCOSE BLDC GLUCOMTR-MCNC: 113 MG/DL — HIGH (ref 70–99)
GLUCOSE BLDC GLUCOMTR-MCNC: 121 MG/DL — HIGH (ref 70–99)
GLUCOSE SERPL-MCNC: 119 MG/DL — HIGH (ref 70–99)
HCT VFR BLD CALC: 37.3 % — LOW (ref 42–52)
HGB BLD-MCNC: 12.4 G/DL — LOW (ref 14–18)
IMM GRANULOCYTES NFR BLD AUTO: 0.2 % — SIGNIFICANT CHANGE UP (ref 0.1–0.3)
LACTATE SERPL-SCNC: 0.8 MMOL/L — SIGNIFICANT CHANGE UP (ref 0.7–2)
LYMPHOCYTES # BLD AUTO: 1 K/UL — LOW (ref 1.2–3.4)
LYMPHOCYTES # BLD AUTO: 15.4 % — LOW (ref 20.5–51.1)
MCHC RBC-ENTMCNC: 29.9 PG — SIGNIFICANT CHANGE UP (ref 27–31)
MCHC RBC-ENTMCNC: 33.2 G/DL — SIGNIFICANT CHANGE UP (ref 32–37)
MCV RBC AUTO: 89.9 FL — SIGNIFICANT CHANGE UP (ref 80–94)
MONOCYTES # BLD AUTO: 0.79 K/UL — HIGH (ref 0.1–0.6)
MONOCYTES NFR BLD AUTO: 12.2 % — HIGH (ref 1.7–9.3)
NEUTROPHILS # BLD AUTO: 4.56 K/UL — SIGNIFICANT CHANGE UP (ref 1.4–6.5)
NEUTROPHILS NFR BLD AUTO: 70.2 % — SIGNIFICANT CHANGE UP (ref 42.2–75.2)
NRBC # BLD: 0 /100 WBCS — SIGNIFICANT CHANGE UP (ref 0–0)
PLATELET # BLD AUTO: 250 K/UL — SIGNIFICANT CHANGE UP (ref 130–400)
PMV BLD: 10.1 FL — SIGNIFICANT CHANGE UP (ref 7.4–10.4)
POTASSIUM SERPL-MCNC: 3.8 MMOL/L — SIGNIFICANT CHANGE UP (ref 3.5–5)
POTASSIUM SERPL-SCNC: 3.8 MMOL/L — SIGNIFICANT CHANGE UP (ref 3.5–5)
PROT SERPL-MCNC: 6.5 G/DL — SIGNIFICANT CHANGE UP (ref 6–8)
RBC # BLD: 4.15 M/UL — LOW (ref 4.7–6.1)
RBC # FLD: 13.6 % — SIGNIFICANT CHANGE UP (ref 11.5–14.5)
SODIUM SERPL-SCNC: 144 MMOL/L — SIGNIFICANT CHANGE UP (ref 135–146)
SPECIMEN SOURCE: SIGNIFICANT CHANGE UP
WBC # BLD: 6.49 K/UL — SIGNIFICANT CHANGE UP (ref 4.8–10.8)
WBC # FLD AUTO: 6.49 K/UL — SIGNIFICANT CHANGE UP (ref 4.8–10.8)

## 2024-11-24 PROCEDURE — 99222 1ST HOSP IP/OBS MODERATE 55: CPT

## 2024-11-24 PROCEDURE — 74176 CT ABD & PELVIS W/O CONTRAST: CPT | Mod: 26

## 2024-11-24 RX ORDER — PHENOBARBITAL 16.2 MG/1
64.8 TABLET ORAL DAILY
Refills: 0 | Status: DISCONTINUED | OUTPATIENT
Start: 2024-11-24 | End: 2024-11-27

## 2024-11-24 RX ORDER — POLYETHYLENE GLYCOL 3350 17 G/17G
17 POWDER, FOR SOLUTION ORAL
Refills: 0 | Status: DISCONTINUED | OUTPATIENT
Start: 2024-11-24 | End: 2024-11-27

## 2024-11-24 RX ORDER — SENNOSIDES 8.6 MG
2 TABLET ORAL AT BEDTIME
Refills: 0 | Status: DISCONTINUED | OUTPATIENT
Start: 2024-11-24 | End: 2024-11-27

## 2024-11-24 RX ORDER — BACLOFEN 100 %
5 POWDER (GRAM) MISCELLANEOUS EVERY 8 HOURS
Refills: 0 | Status: DISCONTINUED | OUTPATIENT
Start: 2024-11-24 | End: 2024-11-27

## 2024-11-24 RX ORDER — LIDOCAINE 40 MG/G
1 CREAM TOPICAL DAILY
Refills: 0 | Status: DISCONTINUED | OUTPATIENT
Start: 2024-11-24 | End: 2024-11-27

## 2024-11-24 RX ORDER — PANTOPRAZOLE SODIUM 40 MG/1
40 TABLET, DELAYED RELEASE ORAL DAILY
Refills: 0 | Status: DISCONTINUED | OUTPATIENT
Start: 2024-11-24 | End: 2024-11-27

## 2024-11-24 RX ORDER — CEFEPIME 2 G/1
INJECTION, POWDER, FOR SOLUTION INTRAVENOUS
Refills: 0 | Status: DISCONTINUED | OUTPATIENT
Start: 2024-11-24 | End: 2024-11-26

## 2024-11-24 RX ORDER — CEFEPIME 2 G/1
2000 INJECTION, POWDER, FOR SOLUTION INTRAVENOUS ONCE
Refills: 0 | Status: COMPLETED | OUTPATIENT
Start: 2024-11-24 | End: 2024-11-24

## 2024-11-24 RX ORDER — DIAZEPAM 10 MG/1
5 TABLET ORAL
Refills: 0 | Status: DISCONTINUED | OUTPATIENT
Start: 2024-11-24 | End: 2024-11-24

## 2024-11-24 RX ORDER — DIAZEPAM 10 MG/1
5 TABLET ORAL
Refills: 0 | Status: DISCONTINUED | OUTPATIENT
Start: 2024-11-24 | End: 2024-11-27

## 2024-11-24 RX ORDER — CEFEPIME 2 G/1
2000 INJECTION, POWDER, FOR SOLUTION INTRAVENOUS EVERY 12 HOURS
Refills: 0 | Status: DISCONTINUED | OUTPATIENT
Start: 2024-11-25 | End: 2024-11-26

## 2024-11-24 RX ORDER — LACTULOSE 10 G/15ML
30 SOLUTION ORAL ONCE
Refills: 0 | Status: COMPLETED | OUTPATIENT
Start: 2024-11-24 | End: 2024-11-24

## 2024-11-24 RX ORDER — LACTULOSE 10 G/15ML
10 SOLUTION ORAL DAILY
Refills: 0 | Status: DISCONTINUED | OUTPATIENT
Start: 2024-11-24 | End: 2024-11-27

## 2024-11-24 RX ORDER — ACETAMINOPHEN 500MG 500 MG/1
650 TABLET, COATED ORAL EVERY 6 HOURS
Refills: 0 | Status: DISCONTINUED | OUTPATIENT
Start: 2024-11-24 | End: 2024-11-27

## 2024-11-24 RX ADMIN — LIDOCAINE 1 PATCH: 40 CREAM TOPICAL at 11:38

## 2024-11-24 RX ADMIN — Medication 10 MILLIGRAM(S): at 01:26

## 2024-11-24 RX ADMIN — LACTULOSE 10 GRAM(S): 10 SOLUTION ORAL at 11:37

## 2024-11-24 RX ADMIN — Medication 10 MILLIGRAM(S): at 11:37

## 2024-11-24 RX ADMIN — LIDOCAINE 1 PATCH: 40 CREAM TOPICAL at 23:36

## 2024-11-24 RX ADMIN — PANTOPRAZOLE SODIUM 40 MILLIGRAM(S): 40 TABLET, DELAYED RELEASE ORAL at 11:38

## 2024-11-24 RX ADMIN — CEFEPIME 100 MILLIGRAM(S): 2 INJECTION, POWDER, FOR SOLUTION INTRAVENOUS at 16:03

## 2024-11-24 RX ADMIN — Medication 5000 UNIT(S): at 06:32

## 2024-11-24 RX ADMIN — Medication 2 TABLET(S): at 22:03

## 2024-11-24 RX ADMIN — Medication 10 MILLIGRAM(S): at 06:32

## 2024-11-24 RX ADMIN — Medication 5000 UNIT(S): at 17:34

## 2024-11-24 RX ADMIN — POLYETHYLENE GLYCOL 3350 17 GRAM(S): 17 POWDER, FOR SOLUTION ORAL at 17:34

## 2024-11-24 RX ADMIN — PHENOBARBITAL 64.8 MILLIGRAM(S): 16.2 TABLET ORAL at 11:37

## 2024-11-24 RX ADMIN — LACTULOSE 30 GRAM(S): 10 SOLUTION ORAL at 06:32

## 2024-11-24 RX ADMIN — LIDOCAINE 1 PATCH: 40 CREAM TOPICAL at 23:37

## 2024-11-24 RX ADMIN — Medication 10 MILLIGRAM(S): at 17:34

## 2024-11-24 RX ADMIN — POLYETHYLENE GLYCOL 3350 17 GRAM(S): 17 POWDER, FOR SOLUTION ORAL at 06:33

## 2024-11-24 NOTE — PROGRESS NOTE ADULT - ASSESSMENT
this is the case of a 69 -year-old male with PMH of cerebral palsy , seizure disorder, BPH, suprapubic catheter, PEG tube, bed/wheelchair-bound at baseline presents to ED for evaluation of neck pain that has been present since 2 days.   had fever when in ED , found to have UTI   Clinically and HD stable    #UTI   #sepsis   WBC at 14 , lactate at 2.1 repeat lactate at 1   patient received one liter of fluid bolus   received ceftriaxone 1000 mg once a day   RVP negative   UA showing large LE , many wbc and many bacteria   blood culture taken , urine culture pending   previous cultures showing pseudomonas multisens   will continue on ceftriaxone   will stop tamsulosin since patient has suprapubic cath     #Abdominal pain   Ptient on multiple laxatives in the group home   distended abdomen painfull on deep palpation   will order CT of the abdomen to rule intra abdominal pathologies as well as to rule out pyelonephritis( in a setting of multiple UTI and suprapubic cath) and fecaloma   wet read shows increased stool burden and distention, will hold on opiates      #neck pain   patient came for neck pain   CT neck showing : Heterogeneous bulky enhancement of the posterior hypopharynx.Possibly an atypically prominent atypically prominent pharyngolaryngeal plexus (normal variant)  will consult ENT   pain management with tyelnol for now   will continue baclofen at home dose   -can give robaxin if pain still not controlled with valium    #seizure disorder  will continue home dose of phenobarbital     Will keep NPO untill CT abdomen results   PAtient when seen by speech was advised to be on pureed diet ( patient uses PEG and oral intake for feeding)     will start DVT proph by heparin 5000 U SC BID

## 2024-11-24 NOTE — PATIENT PROFILE ADULT - FALL HARM RISK - HARM RISK INTERVENTIONS
Assistance with ambulation/Assistance OOB with selected safe patient handling equipment/Communicate Risk of Fall with Harm to all staff/Discuss with provider need for PT consult/Monitor gait and stability/Reinforce activity limits and safety measures with patient and family/Tailored Fall Risk Interventions/Visual Cue: Yellow wristband and red socks/Bed in lowest position, wheels locked, appropriate side rails in place/Call bell, personal items and telephone in reach/Instruct patient to call for assistance before getting out of bed or chair/Non-slip footwear when patient is out of bed/[place] to call system/Physically safe environment - no spills, clutter or unnecessary equipment/Purposeful Proactive Rounding/Room/bathroom lighting operational, light cord in reach

## 2024-11-25 LAB
ALBUMIN SERPL ELPH-MCNC: 3.7 G/DL — SIGNIFICANT CHANGE UP (ref 3.5–5.2)
ALP SERPL-CCNC: 81 U/L — SIGNIFICANT CHANGE UP (ref 30–115)
ALT FLD-CCNC: 8 U/L — SIGNIFICANT CHANGE UP (ref 0–41)
ANION GAP SERPL CALC-SCNC: 11 MMOL/L — SIGNIFICANT CHANGE UP (ref 7–14)
APPEARANCE UR: ABNORMAL
AST SERPL-CCNC: 12 U/L — SIGNIFICANT CHANGE UP (ref 0–41)
BILIRUB SERPL-MCNC: 0.3 MG/DL — SIGNIFICANT CHANGE UP (ref 0.2–1.2)
BILIRUB UR-MCNC: NEGATIVE — SIGNIFICANT CHANGE UP
BUN SERPL-MCNC: 10 MG/DL — SIGNIFICANT CHANGE UP (ref 10–20)
CALCIUM SERPL-MCNC: 9.5 MG/DL — SIGNIFICANT CHANGE UP (ref 8.4–10.5)
CHLORIDE SERPL-SCNC: 103 MMOL/L — SIGNIFICANT CHANGE UP (ref 98–110)
CO2 SERPL-SCNC: 26 MMOL/L — SIGNIFICANT CHANGE UP (ref 17–32)
COLOR SPEC: YELLOW — SIGNIFICANT CHANGE UP
CREAT SERPL-MCNC: 0.6 MG/DL — LOW (ref 0.7–1.5)
DIFF PNL FLD: NEGATIVE — SIGNIFICANT CHANGE UP
EGFR: 104 ML/MIN/1.73M2 — SIGNIFICANT CHANGE UP
GLUCOSE SERPL-MCNC: 118 MG/DL — HIGH (ref 70–99)
GLUCOSE UR QL: NEGATIVE MG/DL — SIGNIFICANT CHANGE UP
HCT VFR BLD CALC: 38 % — LOW (ref 42–52)
HGB BLD-MCNC: 12.7 G/DL — LOW (ref 14–18)
KETONES UR-MCNC: NEGATIVE MG/DL — SIGNIFICANT CHANGE UP
LEUKOCYTE ESTERASE UR-ACNC: ABNORMAL
MCHC RBC-ENTMCNC: 30 PG — SIGNIFICANT CHANGE UP (ref 27–31)
MCHC RBC-ENTMCNC: 33.4 G/DL — SIGNIFICANT CHANGE UP (ref 32–37)
MCV RBC AUTO: 89.8 FL — SIGNIFICANT CHANGE UP (ref 80–94)
NITRITE UR-MCNC: NEGATIVE — SIGNIFICANT CHANGE UP
NRBC # BLD: 0 /100 WBCS — SIGNIFICANT CHANGE UP (ref 0–0)
PH UR: >=9 (ref 5–8)
PLATELET # BLD AUTO: 244 K/UL — SIGNIFICANT CHANGE UP (ref 130–400)
PMV BLD: 10.4 FL — SIGNIFICANT CHANGE UP (ref 7.4–10.4)
POTASSIUM SERPL-MCNC: 4 MMOL/L — SIGNIFICANT CHANGE UP (ref 3.5–5)
POTASSIUM SERPL-SCNC: 4 MMOL/L — SIGNIFICANT CHANGE UP (ref 3.5–5)
PROT SERPL-MCNC: 6.5 G/DL — SIGNIFICANT CHANGE UP (ref 6–8)
PROT UR-MCNC: 100 MG/DL
RBC # BLD: 4.23 M/UL — LOW (ref 4.7–6.1)
RBC # FLD: 13.4 % — SIGNIFICANT CHANGE UP (ref 11.5–14.5)
SODIUM SERPL-SCNC: 140 MMOL/L — SIGNIFICANT CHANGE UP (ref 135–146)
SP GR SPEC: 1.01 — SIGNIFICANT CHANGE UP (ref 1–1.03)
UROBILINOGEN FLD QL: 0.2 MG/DL — SIGNIFICANT CHANGE UP (ref 0.2–1)
WBC # BLD: 5.65 K/UL — SIGNIFICANT CHANGE UP (ref 4.8–10.8)
WBC # FLD AUTO: 5.65 K/UL — SIGNIFICANT CHANGE UP (ref 4.8–10.8)

## 2024-11-25 PROCEDURE — 99223 1ST HOSP IP/OBS HIGH 75: CPT

## 2024-11-25 PROCEDURE — 99233 SBSQ HOSP IP/OBS HIGH 50: CPT

## 2024-11-25 RX ADMIN — Medication 5000 UNIT(S): at 17:31

## 2024-11-25 RX ADMIN — PHENOBARBITAL 64.8 MILLIGRAM(S): 16.2 TABLET ORAL at 12:10

## 2024-11-25 RX ADMIN — POLYETHYLENE GLYCOL 3350 17 GRAM(S): 17 POWDER, FOR SOLUTION ORAL at 17:31

## 2024-11-25 RX ADMIN — POLYETHYLENE GLYCOL 3350 17 GRAM(S): 17 POWDER, FOR SOLUTION ORAL at 05:45

## 2024-11-25 RX ADMIN — LIDOCAINE 1 PATCH: 40 CREAM TOPICAL at 19:00

## 2024-11-25 RX ADMIN — PANTOPRAZOLE SODIUM 40 MILLIGRAM(S): 40 TABLET, DELAYED RELEASE ORAL at 12:10

## 2024-11-25 RX ADMIN — Medication 2 TABLET(S): at 21:54

## 2024-11-25 RX ADMIN — LACTULOSE 10 GRAM(S): 10 SOLUTION ORAL at 12:10

## 2024-11-25 RX ADMIN — CEFEPIME 100 MILLIGRAM(S): 2 INJECTION, POWDER, FOR SOLUTION INTRAVENOUS at 05:45

## 2024-11-25 RX ADMIN — Medication 5000 UNIT(S): at 05:45

## 2024-11-25 RX ADMIN — LIDOCAINE 1 PATCH: 40 CREAM TOPICAL at 12:11

## 2024-11-25 RX ADMIN — CEFEPIME 100 MILLIGRAM(S): 2 INJECTION, POWDER, FOR SOLUTION INTRAVENOUS at 17:30

## 2024-11-25 NOTE — PROGRESS NOTE ADULT - ATTENDING COMMENTS
#Sepsis, present on admission  presumed due to uti; chronic spc  rvp neg  ct abd with nonspecific bladder wall thickening  cxr with atelectasis  ucx contaminated; repeat  bcx ntd  cefepime    #Abnormal ct  ct with heterogeneous bulky enhancement  posterior hypopharynx  ent eval      #Progress Note Handoff  Pending (specify): repeat ucx; ent eval  Family discussion: d/w aid; voicemail left for brother  Disposition: assisted living

## 2024-11-25 NOTE — CONSULT NOTE ADULT - SUBJECTIVE AND OBJECTIVE BOX
ENT CONSULT:  Pt is a 68yo M with PMHX including Cerebral palsy, retention s/p SPT, s/p PEG a/w neck pain, UTI; ENT consulted for neck pain and CT finding of an atypically prominent pharyngolaryngeal plexus. Pt seen at bedside with aide present. Pt reports R sided neck pain since Saturday. States pain comes and goes and is worse when turning head from side to side. Pt reports he currently does not have pain. Denies pain with swallowing, difficulty toerating secretions.      PAST MEDICAL & SURGICAL HISTORY:  BPH (benign prostatic hyperplasia)    Cerebral palsy    Seizure  last seizure >10 years ago    Osteoporosis    Spastic quadriplegia    Urinary calculi    Urinary retention    Asthma    S/P percutaneous endoscopic gastrostomy (PEG) tube placement    H/O cystoscopy    Suprapubic catheter      MEDICATIONS  (STANDING):  cefepime   IVPB      cefepime   IVPB 2000 milliGRAM(s) IV Intermittent every 12 hours  heparin   Injectable 5000 Unit(s) SubCutaneous every 12 hours  lactulose Syrup 10 Gram(s) Oral daily  lidocaine   4% Patch 1 Patch Transdermal daily  pantoprazole   Suspension 40 milliGRAM(s) Oral daily  PHENobarbital 64.8 milliGRAM(s) Oral daily  polyethylene glycol 3350 17 Gram(s) Oral two times a day  senna 2 Tablet(s) Oral at bedtime    MEDICATIONS  (PRN):  acetaminophen     Tablet .. 650 milliGRAM(s) Oral every 6 hours PRN Temp greater or equal to 38C (100.4F)  baclofen 5 milliGRAM(s) Oral every 8 hours PRN Muscle Spasm  diazepam    Tablet 5 milliGRAM(s) Oral two times a day PRN muscle spasm      Allergies  No Known Allergies      SOCIAL HISTORY:    FAMILY HISTORY:      REVIEW OF SYSTEMS   [x] A ten-point review of systems was otherwise negative except as noted.    Vital Signs Last 24 Hrs  T(C): 37.2 (25 Nov 2024 07:08), Max: 37.6 (24 Nov 2024 14:58)  T(F): 98.9 (25 Nov 2024 07:08), Max: 99.7 (24 Nov 2024 14:58)  HR: 77 (25 Nov 2024 07:08) (74 - 87)  BP: 115/68 (25 Nov 2024 07:08) (115/68 - 130/82)  BP(mean): 98 (24 Nov 2024 14:58) (98 - 98)  RR: 18 (25 Nov 2024 07:08) (18 - 18)  SpO2: 97% (25 Nov 2024 07:08) (96% - 97%)    Parameters below as of 25 Nov 2024 00:05  Patient On (Oxygen Delivery Method): nasal cannula  O2 Flow (L/min): 2      GEN: NAD. No drooling or pooling of secretions. No stridor  NEURO: Awake and alert   SKIN: Good color, non diaphoretic  HEENT: Poor dentition; dry oral mucosa   NECK: No ttp to b/l neck at time of exam  RESP: Non-labored breathing  CARDIO: +S1/S2      LABS:                        12.7   5.65  )-----------( 244      ( 25 Nov 2024 06:17 )             38.0     11-25    140  |  103  |  10  ----------------------------<  118[H]  4.0   |  26  |  0.6[L]    Ca    9.5      25 Nov 2024 06:17    TPro  6.5  /  Alb  3.7  /  TBili  0.3  /  DBili  x   /  AST  12  /  ALT  8   /  AlkPhos  81  11-25      RADIOLOGY & ADDITIONAL STUDIES:  < from: CT Neck Soft Tissue w/ IV Cont (11.23.24 @ 18:09) >  ACC: [id number] EXAM:  CT NECK SOFT TISSUE IC   ORDERED BY: JEOVANNY ARROYO     PROCEDURE DATE:  11/23/2024    INTERPRETATION:  Clinical history: 69-year-old male with right-sided neck   pain    TECHNIQUE: Axial contiguous images obtained the head and neck following   the intravenous administration of contrast material.    Comparison studies: None    FINDINGS:    Salivary glands: Parotid and submandibular glands unremarkable    Thyroid gland unremarkable    Upper aerodigestive tract: Heterogeneous enhancement in the region of the   posterior hypopharynx extending across the midline. This can reflect the   normal pharyngeal and laryngeal venous plexus, atypically prominent   pharyngolaryngeal plexus. No discrete mass.    Lymph nodes:No imaging evidence for pathologic lymphadenopathy.    Prominent external jugular veins overlying the sternocleidomastoid   muscles, normal variant.    No dominant neck masses demonstrated:    Vessels: Mild atherosclerotic change.    Visualized brainparenchyma included on the study is unremarkable.    Approximately 1.5 cm markedly enhancing structure right orbit, its   association with very prominent enhancing suprapelvic vein suggest venous   varix.    Elevated left hemidiaphragm.    IMPRESSION:    1.  Heterogeneous bulky enhancement of the posterior hypopharynx.Possibly   an atypically prominent atypically prominent pharyngolaryngeal plexus   (normal variant)    2.  Consider direct ENT visualization    3.  Right orbital mass consistent with venous varix      --- End of Report ---    SAL GUERRA MD; Attending Interventional Radiologist  This document has been electronically signed. Nov 23 2024  8:24PM    < end of copied text >

## 2024-11-25 NOTE — PROGRESS NOTE ADULT - ASSESSMENT
69 -year-old male with PMH of cerebral palsy , seizure disorder, BPH, suprapubic catheter, PEG tube, bed/wheelchair-bound at baseline presents to ED for evaluation of neck pain that has been present since 2 days. Had fever when in ED , found to have UTI. Admitted for further management.     #UTI   #sepsis   -WBC at 14 , lactate 2.6->0.8   -RVP negative   -UA showing large LE , many wbc and many bacteria   -blood culture taken , urine culture pending   -previous cultures showing pseudomonas multisens   - continue on ceftriaxone   -will stop tamsulosin since patient has suprapubic cath   - repeat UA 11/25  - fu ucx and bcx      #Abdominal pain   -Patient on multiple laxatives in the group home   -CT of the abdomen to rule intra abdominal pathologies as well as to rule out pyelonephritis( in a setting of multiple UTI and suprapubic cath) and fecaloma   wet read shows increased stool burden and distention, will hold on opiates  - done but official read still pending 11/25      #neck pain   -CT neck showing : Heterogeneous bulky enhancement of the posterior hypopharynx. Possibly an atypically prominent atypically prominent pharyngolaryngeal plexus (normal variant)  -ENT consulted  -pain management with tyelnol for now   -will continue baclofen at home dose   -can give robaxin if pain still not controlled with valium  - fu ENT 11/25    #seizure disorder  - continue home dose of phenobarbital     #diet  -Will keep NPO untill CT abdomen results   -Patient when seen by speech was advised to be on pureed diet ( patient uses PEG and oral intake for feeding)     # DVT prop  - heparin 5000 U SC BID

## 2024-11-25 NOTE — CONSULT NOTE ADULT - NS ATTEND AMEND GEN_ALL_CORE FT
I have personally reviewed the medical chart including labs and imaging. I have physically examined the patient at bedside with the ACP and provided counseling as needed.     I performed a flexible fiberoptic laryngoscopy at bedside to evaluate the upper airway in light of incidental finding of prominent pharyngeal plexus.     Indication: neck pain M54.2  Permission granted for flexible fiberoptic laryngoscopy.  The flexible fiberoptic laryngoscope was introduced into the left nare to examine the nasal cavity, nasopharynx, oropharynx, supraglottis and glottis.    Findings:   Nasal cavity: mild mucosal edema, no purulence, no polyposis, OMC patent.  Choana: clear  Oropharynx: mild posterior oropharyngeal mucosa erythema, no edema  Vallecula: clear  Pyriform sinuses: clear  True vocal folds: non-edematous, non-erythematous, fully mobile bilaterally  Subglottic triangle: patent  Hypopharynx - no evidence of mass.   Patient tolerated procedure well without complications.     Pain likely musculoskeletal.   Incidental lesion likely benign hypervascular plexus - no concerning mass on FFL.   Follow up with ENT in 3 months as outpatient for serial FFL.     Manfred Maldonado MD MPH   Otolaryngology

## 2024-11-25 NOTE — CONSULT NOTE ADULT - ASSESSMENT
Pt is a 70yo M with PMHX including Cerebral palsy, retention s/p SPT, s/p PEG a/w neck pain, UTI; ENT consulted for neck pain and CT finding of an atypically prominent pharyngolaryngeal plexus.    ·	Will review imaging with attending   ·	Cont with pain control   Pt is a 70yo M with PMHX including Cerebral palsy, retention s/p SPT, s/p PEG a/w neck pain, UTI; ENT consulted for neck pain and CT finding of an atypically prominent pharyngolaryngeal plexus.    ·	Will review imaging with attending   ·	Cont with pain control    ADDENDUM:  -Pt seen at bedside during rounds, CT Neck reviewed.  -FFL performed at bedside WNL  -No acute ENT intervention  -Recommend followup outpatient with ENT in 3 months for rescope

## 2024-11-26 PROCEDURE — 99233 SBSQ HOSP IP/OBS HIGH 50: CPT

## 2024-11-26 RX ORDER — CIPROFLOXACIN HCL 750 MG
400 TABLET ORAL EVERY 12 HOURS
Refills: 0 | Status: DISCONTINUED | OUTPATIENT
Start: 2024-11-26 | End: 2024-11-27

## 2024-11-26 RX ADMIN — PANTOPRAZOLE SODIUM 40 MILLIGRAM(S): 40 TABLET, DELAYED RELEASE ORAL at 12:04

## 2024-11-26 RX ADMIN — POLYETHYLENE GLYCOL 3350 17 GRAM(S): 17 POWDER, FOR SOLUTION ORAL at 17:20

## 2024-11-26 RX ADMIN — Medication 5000 UNIT(S): at 05:55

## 2024-11-26 RX ADMIN — Medication 200 MILLIGRAM(S): at 17:18

## 2024-11-26 RX ADMIN — PHENOBARBITAL 64.8 MILLIGRAM(S): 16.2 TABLET ORAL at 12:04

## 2024-11-26 RX ADMIN — POLYETHYLENE GLYCOL 3350 17 GRAM(S): 17 POWDER, FOR SOLUTION ORAL at 05:56

## 2024-11-26 RX ADMIN — Medication 5000 UNIT(S): at 17:20

## 2024-11-26 RX ADMIN — LIDOCAINE 1 PATCH: 40 CREAM TOPICAL at 00:09

## 2024-11-26 RX ADMIN — LACTULOSE 10 GRAM(S): 10 SOLUTION ORAL at 12:04

## 2024-11-26 RX ADMIN — CEFEPIME 100 MILLIGRAM(S): 2 INJECTION, POWDER, FOR SOLUTION INTRAVENOUS at 05:55

## 2024-11-26 NOTE — CONSULT NOTE ADULT - ASSESSMENT
#Seborrheic dermatitis with possible worsening from weather, stress or contact dermatitis:     - start hydrocortisone ointment 2.5% BID to affected areas face/ sed;   - Ketoconazole shampoo biw to wash scalp and face;  - skin care counseling for sensitive skin done / avoid any irritating topical agents or cleansing materials, use soaps for sensitive skin like dove, aveeno, cetaphil or cerave.    Can f/u with outpatient dermatologist.     Please don't hesitate to call with any questions or if the derm condition gets worse.

## 2024-11-26 NOTE — PROGRESS NOTE ADULT - ASSESSMENT
69 -year-old male with PMH of cerebral palsy , seizure disorder, BPH, suprapubic catheter, PEG tube, bed/wheelchair-bound at baseline presents to ED for evaluation of neck pain that has been present since 2 days. Had fever when in ED , found to have UTI. Admitted for further management.     #UTI   #sepsis   -WBC at 14 , lactate 2.6->0.8   -RVP negative   -UA showing large LE , many wbc and many bacteria  -blood culture taken , urine culture pending   -previous cultures showing pseudomonas multisens   - continue on ceftriaxone   -will stop tamsulosin since patient has suprapubic cath   - repeat UA 11/25  - fu ucx and bcx      #Abdominal pain   -Patient on multiple laxatives in the group home   -CT of the abdomen to rule intra abdominal pathologies as well as to rule out pyelonephritis( in a setting of multiple UTI and suprapubic cath) and fecaloma   wet read shows increased stool burden and distention, will hold on opiates  - done but official read still pending 11/25    #neck pain   -CT neck showing : Heterogeneous bulky enhancement of the posterior hypopharynx. Possibly an atypically prominent atypically prominent pharyngolaryngeal plexus (normal variant)  -ENT consulted: FFl performed and was normal; f/u OP in 3 months  -pain management with tyelnol for now   -will continue baclofen at home dose   -can give robaxin if pain still not controlled with valium    #seizure disorder  - continue home dose of phenobarbital     #diet  -Will keep NPO untill CT abdomen results   -Patient when seen by speech was advised to be on pureed diet ( patient uses PEG and oral intake for feeding)     # DVT prop  - heparin 5000 U SC BID   69 -year-old male with PMH of cerebral palsy , seizure disorder, BPH, suprapubic catheter, PEG tube, bed/wheelchair-bound at baseline presents to ED for evaluation of neck pain that has been present since 2 days. Had fever when in ED , found to have UTI. Admitted for further management.     #UTI   #sepsis   -WBC at 14 , lactate 2.6->0.8   -RVP negative   -UA showing large LE , many wbc and many bacteria  -blood culture taken , urine culture pending   -previous cultures showing pseudomonas multisens   - continue on ceftriaxone   -will stop tamsulosin since patient has suprapubic cath   - repeat UA 11/25  - fu ucx and bcx      #Abdominal pain   -Patient on multiple laxatives in the group home   -CT of the abdomen to rule intra abdominal pathologies as well as to rule out pyelonephritis( in a setting of multiple UTI and suprapubic cath) and fecaloma   wet read shows increased stool burden and distention, will hold on opiates  - done but official read still pending 11/25    #neck pain   -CT neck showing : Heterogeneous bulky enhancement of the posterior hypopharynx. Possibly an atypically prominent atypically prominent pharyngolaryngeal plexus (normal variant)  -ENT consulted: FFl performed and was normal; f/u OP in 3 months  -pain management with tyelnol for now   -will continue baclofen at home dose   -can give robaxin if pain still not controlled with valium    #seizure disorder  - continue home dose of phenobarbital     #diet  - CT A/P no acute patho  -Patient when seen by speech was advised to be on pureed diet with mod thick liquids (patient uses PEG and oral intake for feeding)     # DVT prop  - heparin 5000 U SC BID

## 2024-11-26 NOTE — PROGRESS NOTE ADULT - ATTENDING COMMENTS
#Facial rash, papular  eczematous; reported h/o eczema of face  nkda; tolerated cipro in past  change cefepime to cipro  derm eval  jonathan    #Sepsis, present on admission  presumed due to uti; chronic spc  rvp neg  ct abd with nonspecific bladder wall thickening  cxr with atelectasis  ucx contaminated; repeat  bcx ntd  cipro    #Abnormal ct  ct with heterogeneous bulky enhancement  posterior hypopharynx  s/p ffl, unrevealing  appreciate ent      #Progress Note Handoff  Pending (specify): derm, monitor rash, ucx  Family discussion: d/w niece via phone  Disposition: assisted living

## 2024-11-26 NOTE — CONSULT NOTE ADULT - SUBJECTIVE AND OBJECTIVE BOX
TISH SHAIKH 69y Male  MRN#: [id number]   Hospital Day: 3d    HPI:  this is the case of a 69 -year-old male with PMH of cerebral palsy , seizure disorder, BPH, suprapubic catheter, PEG tube, bed/wheelchair-bound at baseline presents to ED for evaluation of neck pain that has been present since 2 days.   Denies any fever/chills, congestion, productivity of his cough, CP, SOB, vomiting, diarrhea, black or bloody stools, hematuria.    States he tolerates food by mouth but uses PEG tube for medications and has used it without difficulty.    in ED vitals were   HR : 104   BP : 123/80   temp : 38.1    Given one shot  of ceftriaxone 1g in the ED   patien       Of note: patient was admitted in september for a UTI and found to have pseudomonas in the URine and treated with ciprofloxacin      (2024 21:33)      SUBJECTIVE  Patient is a 69y old Male who presents with a chief complaint of NEck pain of 2 days duration (2024 09:01)  Currently admitted to medicine with the primary diagnosis of Acute UTI      INTERVAL HPI AND OVERNIGHT EVENTS:  Patient was examined and seen at bedside. This morning he is resting comfortably in bed and reports no issues or overnight events.    REVIEW OF SYMPTOMS:  CONSTITUTIONAL: No weakness, fevers or chills; No headaches  EYES: No visual changes, eye pain, or discharge  ENT: No vertigo; No ear pain or change in hearing; No sore throat or difficulty swallowing  NECK: No pain or stiffness  RESPIRATORY: No cough, wheezing, or hemoptysis; No shortness of breath  CARDIOVASCULAR: No chest pain or palpitations  GASTROINTESTINAL: No abdominal or epigastric pain; No nausea, vomiting, or hematemesis; No diarrhea or constipation; No melena or hematochezia  GENITOURINARY: No dysuria, frequency or hematuria  MUSCULOSKELETAL: No joint pain, no muscle pain, no weakness  NEUROLOGICAL: No numbness or weakness  SKIN: No itching or rashes    OBJECTIVE  PAST MEDICAL & SURGICAL HISTORY  BPH (benign prostatic hyperplasia)    Cerebral palsy    Seizure  last seizure >10 years ago    Osteoporosis    Spastic quadriplegia    Urinary calculi    Urinary retention    Asthma    S/P percutaneous endoscopic gastrostomy (PEG) tube placement    H/O cystoscopy    Suprapubic catheter      ALLERGIES:  No Known Allergies    MEDICATIONS:  STANDING MEDICATIONS  ciprofloxacin   IVPB 400 milliGRAM(s) IV Intermittent every 12 hours  heparin   Injectable 5000 Unit(s) SubCutaneous every 12 hours  lactulose Syrup 10 Gram(s) Oral daily  lidocaine   4% Patch 1 Patch Transdermal daily  pantoprazole   Suspension 40 milliGRAM(s) Oral daily  PHENobarbital 64.8 milliGRAM(s) Oral daily  polyethylene glycol 3350 17 Gram(s) Oral two times a day  senna 2 Tablet(s) Oral at bedtime    PRN MEDICATIONS  acetaminophen     Tablet .. 650 milliGRAM(s) Oral every 6 hours PRN  baclofen 5 milliGRAM(s) Oral every 8 hours PRN  diazepam    Tablet 5 milliGRAM(s) Oral two times a day PRN      VITAL SIGNS: Last 24 Hours  T(C): 37.2 (2024 14:53), Max: 37.2 (2024 14:53)  T(F): 98.9 (2024 14:53), Max: 98.9 (2024 14:53)  HR: 92 (2024 14:53) (73 - 96)  BP: 125/76 (2024 14:53) (93/73 - 138/77)  BP(mean): 97 (2024 15:53) (97 - 97)  RR: 18 (2024 14:53) (18 - 18)  SpO2: 98% (2024 14:53) (95% - 98%)    LABS:                        12.7   5.65  )-----------( 244      ( 2024 06:17 )             38.0         140  |  103  |  10  ----------------------------<  118[H]  4.0   |  26  |  0.6[L]    Ca    9.5      2024 06:17    TPro  6.5  /  Alb  3.7  /  TBili  0.3  /  DBili  x   /  AST  12  /  ALT  8   /  AlkPhos  81        Urinalysis Basic - ( 2024 09:15 )    Color: Yellow / Appearance: Turbid / S.012 / pH: x  Gluc: x / Ketone: Negative mg/dL  / Bili: Negative / Urobili: 0.2 mg/dL   Blood: x / Protein: 100 mg/dL / Nitrite: Negative   Leuk Esterase: Large / RBC: 9 /HPF / WBC 22 /HPF   Sq Epi: x / Non Sq Epi: 2 /HPF / Bacteria: Many /HPF            Culture - Blood (collected 2024 20:52)  Source: .Blood BLOOD  Preliminary Report (2024 04:01):    No growth at 48 Hours    Urinalysis with Rflx Culture (collected 2024 17:34)    Culture - Urine (collected 2024 17:34)  Source: Clean Catch None  Final Report (2024 22:42):    >=3 organisms. Probable collection contamination.          RADIOLOGY:      PHYSICAL EXAM:  CONSTITUTIONAL: No acute distress, well-developed, well-groomed, AAOx3  HEAD: Atraumatic, normocephalic  EYES: EOM intact, PERRLA, conjunctiva and sclera clear  ENT: Supple, no masses, no thyromegaly, no bruits, no JVD; moist mucous membranes  PULMONARY: Clear to auscultation bilaterally; no wheezes, rales, or rhonchi  CARDIOVASCULAR: Regular rate and rhythm; no murmurs, rubs, or gallops  GASTROINTESTINAL: Soft, non-tender, non-distended; bowel sounds present  MUSCULOSKELETAL: 2+ peripheral pulses; no clubbing, no cyanosis, no edema  NEUROLOGY: non-focal  SKIN: No rashes or lesions; warm and dry    ASSESSMENT & PLAN    #Flare of atopic dermatitis with possible component of contact dermatitis  - hydrocortisone ointment 2.5% BID to affected areas  - discussed with attending dermatologist dr guidry TISH SHAIKH 69y Male  MRN#: [id number]   Hospital Day: 3d    HPI:  this is the case of a 69 -year-old male with PMH of cerebral palsy , seizure disorder, BPH, suprapubic catheter, PEG tube, bed/wheelchair-bound at baseline presents to ED for evaluation of neck pain that has been present since 2 days.   Denies any fever/chills, congestion, productivity of his cough, CP, SOB, vomiting, diarrhea, black or bloody stools, hematuria.    States he tolerates food by mouth but uses PEG tube for medications and has used it without difficulty.    in ED vitals were   HR : 104   BP : 123/80   temp : 38.1    Given one shot  of ceftriaxone 1g in the ED   patien       Of note: patient was admitted in september for a UTI and found to have pseudomonas in the URine and treated with ciprofloxacin      (2024 21:33)      SUBJECTIVE  Patient is a 69y old Male who presents with a chief complaint of NEck pain of 2 days duration (2024 09:01)  Currently admitted to medicine with the primary diagnosis of Acute UTI      INTERVAL HPI AND OVERNIGHT EVENTS:  Patient was examined and seen at bedside. This morning he is resting comfortably in bed and reports no issues or overnight events.    OBJECTIVE  PAST MEDICAL & SURGICAL HISTORY  BPH (benign prostatic hyperplasia)    Cerebral palsy    Seizure  last seizure >10 years ago    Osteoporosis    Spastic quadriplegia    Urinary calculi    Urinary retention    Asthma    S/P percutaneous endoscopic gastrostomy (PEG) tube placement    H/O cystoscopy    Suprapubic catheter      ALLERGIES:  No Known Allergies    MEDICATIONS:  STANDING MEDICATIONS  ciprofloxacin   IVPB 400 milliGRAM(s) IV Intermittent every 12 hours  heparin   Injectable 5000 Unit(s) SubCutaneous every 12 hours  lactulose Syrup 10 Gram(s) Oral daily  lidocaine   4% Patch 1 Patch Transdermal daily  pantoprazole   Suspension 40 milliGRAM(s) Oral daily  PHENobarbital 64.8 milliGRAM(s) Oral daily  polyethylene glycol 3350 17 Gram(s) Oral two times a day  senna 2 Tablet(s) Oral at bedtime    PRN MEDICATIONS  acetaminophen     Tablet .. 650 milliGRAM(s) Oral every 6 hours PRN  baclofen 5 milliGRAM(s) Oral every 8 hours PRN  diazepam    Tablet 5 milliGRAM(s) Oral two times a day PRN      VITAL SIGNS: Last 24 Hours  T(C): 37.2 (2024 14:53), Max: 37.2 (2024 14:53)  T(F): 98.9 (2024 14:53), Max: 98.9 (2024 14:53)  HR: 92 (2024 14:53) (73 - 96)  BP: 125/76 (2024 14:53) (93/73 - 138/77)  BP(mean): 97 (2024 15:53) (97 - 97)  RR: 18 (2024 14:53) (18 - 18)  SpO2: 98% (2024 14:53) (95% - 98%)    LABS:                        12.7   5.65  )-----------( 244      ( 2024 06:17 )             38.0     11    140  |  103  |  10  ----------------------------<  118[H]  4.0   |  26  |  0.6[L]    Ca    9.5      2024 06:17    TPro  6.5  /  Alb  3.7  /  TBili  0.3  /  DBili  x   /  AST  12  /  ALT  8   /  AlkPhos  81  11-25      Urinalysis Basic - ( 2024 09:15 )    Color: Yellow / Appearance: Turbid / S.012 / pH: x  Gluc: x / Ketone: Negative mg/dL  / Bili: Negative / Urobili: 0.2 mg/dL   Blood: x / Protein: 100 mg/dL / Nitrite: Negative   Leuk Esterase: Large / RBC: 9 /HPF / WBC 22 /HPF   Sq Epi: x / Non Sq Epi: 2 /HPF / Bacteria: Many /HPF            Culture - Blood (collected 2024 20:52)  Source: .Blood BLOOD  Preliminary Report (2024 04:01):    No growth at 48 Hours    Urinalysis with Rflx Culture (collected 2024 17:34)    Culture - Urine (collected 2024 17:34)  Source: Clean Catch None  Final Report (2024 22:42):    >=3 organisms. Probable collection contamination.          RADIOLOGY:      PHYSICAL EXAM:  CONSTITUTIONAL: No acute distress,  AAOx2  HEAD: erythematous scaly patches forehead, BL cheeks, and chin  EYES: EOM intact, PERRLA, conjunctiva and sclera clear  ENT: Supple, no masses, no thyromegaly, no bruits, no JVD; moist mucous membranes  PULMONARY: Clear to auscultation bilaterally; no wheezes, rales, or rhonchi  CARDIOVASCULAR: Regular rate and rhythm; no murmurs, rubs, or gallops    ASSESSMENT & PLAN    #Flare of atopic dermatitis with possible component of contact dermatitis  - has these rashes at facility and uses multiple creams  - here, may have been irritated by cleansing materials, and lack of application of home creams  - start hydrocortisone ointment 2.5% BID to affected areas  - avoid corrosive cleaning materials  - discussed with attending dermatologist dr guidry  69 -year-old male with PMH of cerebral palsy , seizure disorder, BPH, suprapubic catheter, PEG tube, bed/wheelchair-bound at baseline presents to ED for evaluation of neck pain that has been present since 2 days and with facial rash that is flaky, irriated and xerotic. As per aide patient used Ketoconazole shampoo in the past for seborrheic dermatitis of the scalp that used to help and has a history of flaky rashes on the face, but usually milder than now and aide not sure what was used in the past to treat facial rashes.  Denies using anything topical or irritating on the face. Can't associate it with any other conditions or medications.   Denies any fever/chills, congestion, productivity of his cough, CP, SOB, vomiting, diarrhea, black or bloody stools, hematuria.    States he tolerates food by mouth but uses PEG tube for medications and has used it without difficulty.    in ED vitals were   HR : 104   BP : 123/80   temp : 38.1    Given one shot  of ceftriaxone 1g in the ED.  Of note: patient was admitted in september for a UTI and found to have pseudomonas in the URine and treated with ciprofloxacin     HPI: Patient is a 69y old Male who presents with a chief complaint of NEck pain of 2 days duration (26 Nov 2024 09:01)        INTERVAL HPI AND OVERNIGHT EVENTS:  Patient was examined and seen at bedside. Patient is resting comfortably in bed and reports no issues or overnight events.    OBJECTIVE  PAST MEDICAL & SURGICAL HISTORY  BPH (benign prostatic hyperplasia)    Cerebral palsy    Seizure  last seizure >10 years ago    Osteoporosis    Spastic quadriplegia    Urinary calculi    Urinary retention    Asthma    S/P percutaneous endoscopic gastrostomy (PEG) tube placement    H/O cystoscopy    Suprapubic catheter      ALLERGIES:  No Known Allergies    MEDICATIONS:  STANDING MEDICATIONS  ciprofloxacin   IVPB 400 milliGRAM(s) IV Intermittent every 12 hours  heparin   Injectable 5000 Unit(s) SubCutaneous every 12 hours  lactulose Syrup 10 Gram(s) Oral daily  lidocaine   4% Patch 1 Patch Transdermal daily  pantoprazole   Suspension 40 milliGRAM(s) Oral daily  PHENobarbital 64.8 milliGRAM(s) Oral daily  polyethylene glycol 3350 17 Gram(s) Oral two times a day  senna 2 Tablet(s) Oral at bedtime    PRN MEDICATIONS  acetaminophen     Tablet .. 650 milliGRAM(s) Oral every 6 hours PRN  baclofen 5 milliGRAM(s) Oral every 8 hours PRN  diazepam    Tablet 5 milliGRAM(s) Oral two times a day PRN      VITAL SIGNS: Last 24 Hours  T(C): 37.2 (26 Nov 2024 14:53), Max: 37.2 (26 Nov 2024 14:53)  T(F): 98.9 (26 Nov 2024 14:53), Max: 98.9 (26 Nov 2024 14:53)  HR: 92 (26 Nov 2024 14:53) (73 - 96)  BP: 125/76 (26 Nov 2024 14:53) (93/73 - 138/77)  BP(mean): 97 (25 Nov 2024 15:53) (97 - 97)  RR: 18 (26 Nov 2024 14:53) (18 - 18)  SpO2: 98% (26 Nov 2024 14:53) (95% - 98%)    LABS:                        12.7   5.65  )-----------( 244      ( 25 Nov 2024 06:17 )             38.0     11-25    140  |  103  |  10  ----------------------------<  118[H]  4.0   |  26  |  0.6[L]    Ca    9.5      25 Nov 2024 06:17    TPro  6.5  /  Alb  3.7  /  TBili  0.3  /  DBili  x   /  AST  12  /  ALT  8   /  AlkPhos  81  11-25      Culture - Blood (collected 23 Nov 2024 20:52)  Source: .Blood BLOOD  Preliminary Report (26 Nov 2024 04:01):    No growth at 48 Hours    Urinalysis with Rflx Culture (collected 23 Nov 2024 17:34)    Culture - Urine (collected 23 Nov 2024 17:34)  Source: Clean Catch None  Final Report (24 Nov 2024 22:42):    >=3 organisms. Probable collection contamination.        PHYSICAL EXAM:  CONSTITUTIONAL: No acute distress,  AAOx2  HEAD: erythematous flaky patches forehead, blt cheeks, and chin; no mucosal lesions or blisters; no other rashes of derm findings on skin.  EYES: EOM intact, PERRLA, conjunctiva and sclera clear  ENT: Supple, no masses, no thyromegaly, no bruits, no JVD; moist mucous membranes  PULMONARY: Clear to auscultation bilaterally; no wheezes, rales, or rhonchi  CARDIOVASCULAR: Regular rate and rhythm; no murmurs, rubs, or gallops

## 2024-11-26 NOTE — PROGRESS NOTE ADULT - SUBJECTIVE AND OBJECTIVE BOX
SUBJECTIVE/OVERNIGHT EVENTS  Today is hospital day 2d. This morning patient was seen and examined at bedside, resting comfortably in bed. No acute or major events overnight.      MEDICATIONS  STANDING MEDICATIONS  cefepime   IVPB      cefepime   IVPB 2000 milliGRAM(s) IV Intermittent every 12 hours  heparin   Injectable 5000 Unit(s) SubCutaneous every 12 hours  lactulose Syrup 10 Gram(s) Oral daily  lidocaine   4% Patch 1 Patch Transdermal daily  pantoprazole   Suspension 40 milliGRAM(s) Oral daily  PHENobarbital 64.8 milliGRAM(s) Oral daily  polyethylene glycol 3350 17 Gram(s) Oral two times a day  senna 2 Tablet(s) Oral at bedtime    PRN MEDICATIONS  acetaminophen     Tablet .. 650 milliGRAM(s) Oral every 6 hours PRN  baclofen 5 milliGRAM(s) Oral every 8 hours PRN  diazepam    Tablet 5 milliGRAM(s) Oral two times a day PRN    VITALS  T(F): 98.9 (11-25-24 @ 07:08), Max: 99.7 (11-24-24 @ 14:58)  HR: 77 (11-25-24 @ 07:08) (74 - 87)  BP: 115/68 (11-25-24 @ 07:08) (115/68 - 130/82)  RR: 18 (11-25-24 @ 07:08) (18 - 18)  SpO2: 97% (11-25-24 @ 07:08) (96% - 97%)  POCT Blood Glucose.: 113 mg/dL (11-24-24 @ 21:05)    PHYSICAL EXAM  GENERAL  (  x) NAD, lying in bed comfortably     (  ) obtunded     (  ) lethargic     (  ) somnolent    HEAD  (  ) Atraumatic     (  ) hematoma     (  ) laceration (specify location:       )     NECK  (  ) Supple     (  ) neck stiffness     (  ) nuchal rigidity     (  )  no JVD     (  ) JVD present ( -- cm)    HEART  Rate -->  ( x ) normal rate    (  ) bradycardic    (  ) tachycardic  Rhythm -->  (  x) regular    (  ) regularly irregular    (  ) irregularly irregular  Murmurs -->  (  ) normal s1/s2    (  ) systolic murmur    (  ) diastolic murmur    (  ) continuous murmur     (  ) S3 present    (  ) S4 present    LUNGS  (x  )Unlabored respirations     (  ) tachypnea  (x  ) B/L air entry     (  ) decreased breath sounds in:  (location     )    (  ) no adventitious sound     (  ) crackles     (  ) wheezing      (  ) rhonchi      (specify location:       )  (  ) chest wall tenderness (specify location:       )    ABDOMEN  ( x ) Soft     (  ) tense   |   (  ) nondistended     (  ) distended   |   (  ) +BS     (  ) hypoactive bowel sounds     (  ) hyperactive bowel sounds  ( x ) nontender     (  ) RUQ tenderness     (  ) RLQ tenderness     (  ) LLQ tenderness     (  ) epigastric tenderness     (  ) diffuse tenderness  (  ) Splenomegaly      (  ) Hepatomegaly      (  ) Jaundice     (  ) ecchymosis   - suprapubic cath    EXTREMITIES  ( x ) Normal     (  ) Rash     (  ) ecchymosis     (  ) varicose veins      (  ) pitting edema     (  ) non-pitting edema   (  ) ulceration     (  ) gangrene:     (location:     )    LABS             12.7   5.65  )-----------( 244      ( 11-25-24 @ 06:17 )             38.0     140  |  103  |  10  -------------------------<  118   11-25-24 @ 06:17  4.0  |  26  |  0.6    Ca      9.5     11-25-24 @ 06:17    TPro  6.5  /  Alb  3.7  /  TBili  0.3  /  DBili  x   /  AST  12  /  ALT  8   /  AlkPhos  81  /  GGT  x     11-25-24 @ 06:17        Urinalysis Basic - ( 25 Nov 2024 06:17 )    Color: x / Appearance: x / SG: x / pH: x  Gluc: 118 mg/dL / Ketone: x  / Bili: x / Urobili: x   Blood: x / Protein: x / Nitrite: x   Leuk Esterase: x / RBC: x / WBC x   Sq Epi: x / Non Sq Epi: x / Bacteria: x          Culture - Blood (collected 23 Nov 2024 20:52)  Source: .Blood BLOOD  Preliminary Report (25 Nov 2024 04:01):    No growth at 24 hours    Urinalysis with Rflx Culture (collected 23 Nov 2024 17:34)    Culture - Urine (collected 23 Nov 2024 17:34)  Source: Clean Catch None  Final Report (24 Nov 2024 22:42):    >=3 organisms. Probable collection contamination.      IMAGING
SUBJECTIVE/OVERNIGHT EVENTS  Today is hospital day 1d. This morning patient was seen and examined at bedside, resting comfortably in bed. No acute or major events overnight.      MEDICATIONS  STANDING MEDICATIONS  baclofen 10 milliGRAM(s) Oral every 6 hours  cefTRIAXone   IVPB 1000 milliGRAM(s) IV Intermittent every 24 hours  heparin   Injectable 5000 Unit(s) SubCutaneous every 12 hours  lactulose Syrup 10 Gram(s) Oral daily  lidocaine   4% Patch 1 Patch Transdermal daily  pantoprazole   Suspension 40 milliGRAM(s) Oral daily  PHENobarbital 64.8 milliGRAM(s) Oral daily  polyethylene glycol 3350 17 Gram(s) Oral two times a day  senna 2 Tablet(s) Oral at bedtime    PRN MEDICATIONS  acetaminophen     Tablet .. 650 milliGRAM(s) Oral every 6 hours PRN  diazepam    Tablet 5 milliGRAM(s) Oral two times a day PRN    VITALS  T(F): 97.2 (11-24-24 @ 07:27), Max: 100.5 (11-23-24 @ 16:47)  HR: 91 (11-24-24 @ 07:27) (85 - 104)  BP: 120/66 (11-24-24 @ 07:27) (120/66 - 130/77)  RR: 18 (11-24-24 @ 07:27) (18 - 20)  SpO2: 95% (11-24-24 @ 07:27) (93% - 99%)  POCT Blood Glucose.: 121 mg/dL (11-24-24 @ 08:22)    Physical Exam:   GENERAL: NAD, lying in bed comfortably  HEAD:  Atraumatic, normocephalic  EYES: EOMI, PERRL  NECK: Supple, trachea midline, no JVD  HEART: Regular rate and rhythm  LUNGS: Unlabored respirations.  Clear to auscultation bilaterally, no crackles, wheezing, or rhonchi  ABDOMEN: Soft, nontender, nondistended, +BS  EXTREMITIES: 2+ peripheral pulses bilaterally. No clubbing, cyanosis, or edema  NERVOUS SYSTEM:  A&Ox3, moving all extremities, no focal deficits         LABS             12.4   6.49  )-----------( 250      ( 11-24-24 @ 07:10 )             37.3     144  |  105  |  11  -------------------------<  119   11-24-24 @ 07:10  3.8  |  25  |  0.5    Ca      9.4     11-24-24 @ 07:10    TPro  6.5  /  Alb  3.8  /  TBili  0.3  /  DBili  x   /  AST  13  /  ALT  8   /  AlkPhos  80  /  GGT  x     11-24-24 @ 07:10        Urinalysis Basic - ( 24 Nov 2024 07:10 )    Color: x / Appearance: x / SG: x / pH: x  Gluc: 119 mg/dL / Ketone: x  / Bili: x / Urobili: x   Blood: x / Protein: x / Nitrite: x   Leuk Esterase: x / RBC: x / WBC x   Sq Epi: x / Non Sq Epi: x / Bacteria: x          Urinalysis with Rflx Culture (collected 23 Nov 2024 17:34)      IMAGING
SUBJECTIVE/OVERNIGHT EVENTS  Today is hospital day 3d. This morning patient was seen and examined at bedside, resting comfortably in bed. No acute or major events overnight.      MEDICATIONS:  STANDING MEDICATIONS  cefepime   IVPB      cefepime   IVPB 2000 milliGRAM(s) IV Intermittent every 12 hours  heparin   Injectable 5000 Unit(s) SubCutaneous every 12 hours  lactulose Syrup 10 Gram(s) Oral daily  lidocaine   4% Patch 1 Patch Transdermal daily  pantoprazole   Suspension 40 milliGRAM(s) Oral daily  PHENobarbital 64.8 milliGRAM(s) Oral daily  polyethylene glycol 3350 17 Gram(s) Oral two times a day  senna 2 Tablet(s) Oral at bedtime    PRN MEDICATIONS  acetaminophen     Tablet .. 650 milliGRAM(s) Oral every 6 hours PRN  baclofen 5 milliGRAM(s) Oral every 8 hours PRN  diazepam    Tablet 5 milliGRAM(s) Oral two times a day PRN    VITALS:   T(F): 98.2  HR: 94  BP: 117/82  RR: 18  SpO2: 95%      PHYSICAL EXAM  GENERAL  (  x) NAD, lying in bed comfortably     (  ) obtunded     (  ) lethargic     (  ) somnolent    HEAD  (  ) Atraumatic     (  ) hematoma     (  ) laceration (specify location:       )     NECK  (  ) Supple     (  ) neck stiffness     (  ) nuchal rigidity     (  )  no JVD     (  ) JVD present ( -- cm)    LABS:                        12.7   5.65  )-----------( 244      ( 2024 06:17 )             38.0     -    140  |  103  |  10  ----------------------------<  118[H]  4.0   |  26  |  0.6[L]    Ca    9.5      2024 06:17    TPro  6.5  /  Alb  3.7  /  TBili  0.3  /  DBili  x   /  AST  12  /  ALT  8   /  AlkPhos  81        Urinalysis Basic - ( 2024 09:15 )    Color: Yellow / Appearance: Turbid / S.012 / pH: x  Gluc: x / Ketone: Negative mg/dL  / Bili: Negative / Urobili: 0.2 mg/dL   Blood: x / Protein: 100 mg/dL / Nitrite: Negative   Leuk Esterase: Large / RBC: 9 /HPF / WBC 22 /HPF   Sq Epi: x / Non Sq Epi: 2 /HPF / Bacteria: Many /HPF            Culture - Blood (collected 2024 20:52)  Source: .Blood BLOOD  Preliminary Report (2024 04:01):    No growth at 48 Hours    Urinalysis with Rflx Culture (collected 2024 17:34)    Culture - Urine (collected 2024 17:34)  Source: Clean Catch None  Final Report (2024 22:42):    >=3 organisms. Probable collection contamination.          RADIOLOGY:  CXR      CT  CT Abdomen and Pelvis No Cont:   ACC: [id number] EXAM:  CT ABDOMEN AND PELVIS   ORDERED BY: ANDREA COFFEY     PROCEDURE DATE:  2024          INTERPRETATION:  CLINICAL INFORMATION: Abdominal pain, recurrent urinary   tract infection    COMPARISON: 2024    CONTRAST/COMPLICATIONS:  IV Contrast: NONE  Oral Contrast: NONE      PROCEDURE:  CT of the Abdomen and Pelvis was performed.  Sagittal and coronal reformats were performed.    FINDINGS: Limited evaluation without IV contrast.  LOWER CHEST: Bibasilar atelectatic changes.    LIVER: Within normal limits.  BILE DUCTS: Normal caliber.  GALLBLADDER: Within normal limits.  SPLEEN: Within normal limits.  PANCREAS: Within normal limits.  ADRENALS: Within normal limits.  KIDNEYS/URETERS: Excreted contrast seen in the collecting system   bilaterally. No hydroureteronephrosis.    BLADDER: Suprapubic catheter is in place. Nonspecific bladder wall   thickening.  REPRODUCTIVE ORGANS: Prostate is enlarged.    BOWEL: Gastrostomy tube is in place. No bowel obstruction. Appendix is   not visualized. Stool-filled colon. Redundant colon.  PERITONEUM/RETROPERITONEUM: Trace ascites in the right inguinal canal.  VESSELS: Within normal limits.  LYMPH NODES: No lymphadenopathy.  ABDOMINAL WALL: Small fat-containing umbilical hernia.  BONES: Degenerative changes. Chronic left femoral neck fracture. Chronic   bilateral hip deformities.    IMPRESSION:  Suprapubic catheter is in place. Nonspecific bladder wall thickening.    See body of report for other chronic incidental findings.    --- End of Report ---            HANK UP MD; Attending Radiologist  This document has been electronically signed. 2024 11:18AM (24 @ 00:07)          HEART  Rate -->  ( x ) normal rate    (  ) bradycardic    (  ) tachycardic  Rhythm -->  (  x) regular    (  ) regularly irregular    (  ) irregularly irregular  Murmurs -->  (  ) normal s1/s2    (  ) systolic murmur    (  ) diastolic murmur    (  ) continuous murmur     (  ) S3 present    (  ) S4 present    LUNGS  (x  )Unlabored respirations     (  ) tachypnea  (x  ) B/L air entry     (  ) decreased breath sounds in:  (location     )    (  ) no adventitious sound     (  ) crackles     (  ) wheezing      (  ) rhonchi      (specify location:       )  (  ) chest wall tenderness (specify location:       )    ABDOMEN  ( x ) Soft     (  ) tense   |   (  ) nondistended     (  ) distended   |   (  ) +BS     (  ) hypoactive bowel sounds     (  ) hyperactive bowel sounds  ( x ) nontender     (  ) RUQ tenderness     (  ) RLQ tenderness     (  ) LLQ tenderness     (  ) epigastric tenderness     (  ) diffuse tenderness  (  ) Splenomegaly      (  ) Hepatomegaly      (  ) Jaundice     (  ) ecchymosis   - suprapubic cath    EXTREMITIES  ( x ) Normal     (  ) Rash     (  ) ecchymosis     (  ) varicose veins      (  ) pitting edema     (  ) non-pitting edema   (  ) ulceration     (  ) gangrene:     (location:     )

## 2024-11-27 ENCOUNTER — TRANSCRIPTION ENCOUNTER (OUTPATIENT)
Age: 69
End: 2024-11-27

## 2024-11-27 VITALS
TEMPERATURE: 97 F | RESPIRATION RATE: 18 BRPM | SYSTOLIC BLOOD PRESSURE: 132 MMHG | HEART RATE: 97 BPM | DIASTOLIC BLOOD PRESSURE: 83 MMHG | OXYGEN SATURATION: 97 %

## 2024-11-27 PROCEDURE — 99239 HOSP IP/OBS DSCHRG MGMT >30: CPT

## 2024-11-27 PROCEDURE — 93010 ELECTROCARDIOGRAM REPORT: CPT

## 2024-11-27 RX ORDER — CIPROFLOXACIN HCL 750 MG
1 TABLET ORAL
Qty: 10 | Refills: 0
Start: 2024-11-27 | End: 2024-12-06

## 2024-11-27 RX ADMIN — Medication 5000 UNIT(S): at 06:15

## 2024-11-27 RX ADMIN — PHENOBARBITAL 64.8 MILLIGRAM(S): 16.2 TABLET ORAL at 11:50

## 2024-11-27 RX ADMIN — Medication 200 MILLIGRAM(S): at 06:19

## 2024-11-27 RX ADMIN — LIDOCAINE 1 PATCH: 40 CREAM TOPICAL at 11:50

## 2024-11-27 RX ADMIN — PANTOPRAZOLE SODIUM 40 MILLIGRAM(S): 40 TABLET, DELAYED RELEASE ORAL at 11:50

## 2024-11-27 NOTE — DISCHARGE NOTE PROVIDER - CARE PROVIDERS DIRECT ADDRESSES
,[email or web address]_Unknown

## 2024-11-27 NOTE — DISCHARGE NOTE PROVIDER - PROVIDER TOKENS
PROVIDER:[TOKEN:[[id number]],FOLLOWUP:[2 months]] PROVIDER:[TOKEN:[[id number]],FOLLOWUP:[2 months]],FREE:[LAST:[Primary care physician],PHONE:[(   )    -],FAX:[(   )    -],FOLLOWUP:[1 week]]

## 2024-11-27 NOTE — DISCHARGE NOTE PROVIDER - NSDCCPCAREPLAN_GEN_ALL_CORE_FT
PRINCIPAL DISCHARGE DIAGNOSIS  Diagnosis: Acute UTI  Assessment and Plan of Treatment: You were admitted to the hospital for sepsis secondary to UTI infection. You had a white blood cell count of 14,000 on admission. Your urinanalysis showed large leukocyte esterase , many WBC and many bacteria. You were given a dose of cetriaxone and  started on ciprofloxacin which you will continue to take for 10 days after your discharge. We held your tamsulosin since you have the suprapubic cathether.  You had abdominal pain during your admission and got a CT Abdomen and Pelvis  to rule out any intraabdominal pathologies as well as pyelonephritis. Your CT A/P was negative and your abdonminal pain resolved.  Your neck pain was examined by the ENT doctors. You had a CT Neck showing heterogenous bulky enchancement of posterior hypopharynx. The ENT performed Flexible fiberoptic laryngoscopy which was normal. You will need to follow up with ENT in 3 months.  Your medications was sent to the pharmacy. Please follow up with your PCP after discharge and ENT in 3 months. If symptoms worsen, please return to the ED.      SECONDARY DISCHARGE DIAGNOSES  Diagnosis: Sepsis  Assessment and Plan of Treatment:

## 2024-11-27 NOTE — DISCHARGE NOTE PROVIDER - NSDCMRMEDTOKEN_GEN_ALL_CORE_FT
Albuterol (Eqv-ProAir HFA) 90 mcg/inh inhalation aerosol: 2 puff(s) inhaled every 6 hours  ammonium lactate topical lotion: Apply topically to affected area 2 times a day to feet  baclofen 10 mg oral tablet: 1 tab(s) orally 4 times a day  ciprofloxacin 100 mg oral tablet: 1 tab(s) orally once a day  DermaPhor topical ointment: Apply topically to affected area once a day  docusate sodium 50 mg/5 mL oral solution: 30 milliliter(s) by gastrostomy tube once a day  lactulose 10 g/15 mL oral syrup: 15 milliliter(s) orally once a day  methenamine hippurate 1 g oral tablet: 1 tab(s) orally once a day  miconazole 2% topical powder: Apply topically to affected area 2 times a day  Multiple Vitamins oral liquid: 1 unspecified orally once a day 1 teaspoon via g tube daily  omeprazole 40 mg oral delayed release capsule: 1 cap(s) orally once a day  Oyster Shell 500 (1250 mg calcium carbonate) oral tablet: 1 tab(s) orally 2 times a day  PHENobarbital 64.8 mg oral tablet: 1 tab(s) orally once a day via G tube  polyethylene glycol 3350 oral powder for reconstitution: 17 gram(s) orally 2 times a day  Refresh ophthalmic solution: 1 drop(s) to each affected eye 3 times a day  senna leaf extract oral tablet: 2 tab(s) orally once a day (at bedtime)

## 2024-11-27 NOTE — DISCHARGE NOTE PROVIDER - ATTENDING DISCHARGE PHYSICAL EXAMINATION:
PHYSICAL EXAM  GEN: no distress, restless and wanting to go home  PULM: BS heard b/l equal, No wheezing  CVS: S1S2 present, no rubs or gallops  ABD: Soft, non-distended, no guarding; non-tender  EXT: No lower extremity edema  NEURO: Awake and alert; oriented to self

## 2024-11-27 NOTE — DISCHARGE NOTE PROVIDER - NSDCFUSCHEDAPPT_GEN_ALL_CORE_FT
[facility] Physician [facility]  UROLOGY [address] [place] [address]  Scheduled Appointment: 12/17/2024

## 2024-11-27 NOTE — DISCHARGE NOTE PROVIDER - CARE PROVIDER_API CALL
Jenaro Cooper  Head/Neck Surgery  [address] [place] [id number]  Phone: [phone]  Fax: [phone]  Follow Up Time: 2 months   Jenaro Cooper  Head/Neck Surgery  [address] [place] [id number]  Phone: [phone]  Fax: [phone]  Follow Up Time: 2 months    Primary care physician,   Phone: (   )    -  Fax: (   )    -  Follow Up Time: 1 week

## 2024-11-27 NOTE — DISCHARGE NOTE NURSING/CASE MANAGEMENT/SOCIAL WORK - PATIENT PORTAL LINK FT
You can access the FollowMyHealth Patient Portal offered by [facility] by registering at the following website: [email or web address]. By joining [employer]’s FollowMyHealth portal, you will also be able to view your health information using other applications (apps) compatible with our system.

## 2024-11-27 NOTE — DISCHARGE NOTE NURSING/CASE MANAGEMENT/SOCIAL WORK - FINANCIAL ASSISTANCE
[facility] provides services at a reduced cost to those who are determined to be eligible through [facility]’s financial assistance program. Information regarding [facility]’s financial assistance program can be found by going to [email or web address] or by calling [phone].

## 2024-11-27 NOTE — DISCHARGE NOTE PROVIDER - HOSPITAL COURSE
69 -year-old male with PMH of cerebral palsy , seizure disorder, BPH, suprapubic catheter, PEG tube, bed/wheelchair-bound at baseline presents to ED for evaluation of neck pain that has been present since 2 days.   Denies any fever/chills, congestion, productivity of his cough, CP, SOB, vomiting, diarrhea, black or bloody stools, hematuria.    States he tolerates food by mouth but uses PEG tube for medications and has used it without difficulty.    in ED vitals were   HR : 104   BP : 123/80   temp : 38.1    Given one shot  of ceftriaxone 1g in the ED     Pt was seen for the following conditions:     #UTI   #sepsis   -WBC at 14 , lactate 2.6->0.8   -RVP negative   -UA showing large LE , many wbc and many bacteria  -previous cultures showing pseudomonas multisens   - s/p ceftriaxone  - continue on ciprofloxacin for 10 days ( 11/27-12/6)  - Stopped tamsulosin since patient has suprapubic cath  - Urine culture: Gram negative rods; pending sensitivities  - Blood culture: No growth to date      #Abdominal pain-resolved  -Patient on multiple laxatives in the group home   -CT of the abdomen w/ IV contrast: Nonspecific bladder wall thickening. No hydroureteronephrosis. Trace ascites in the right inguinal canal    #neck pain   -CT neck showing : Heterogeneous bulky enhancement of the posterior hypopharynx. Possibly an atypically prominent atypically prominent pharyngolaryngeal plexus (normal variant)  -ENT consulted: FFl performed and was normal; f/u OP in 3 months  -pain management with Tylenol for now   -Continue baclofen at home dose     #seizure disorder  - Continue home dose of phenobarbital     #diet  - CT A/P no acute patho  -Patient when seen by speech was advised to be on pureed diet with mod thick liquid.    Pt is medically stable for discharge. Discharge is discussed with Dr. Augustin.

## 2024-11-28 LAB
-  AMOXICILLIN/CLAVULANIC ACID: SIGNIFICANT CHANGE UP
-  AMPICILLIN/SULBACTAM: SIGNIFICANT CHANGE UP
-  AMPICILLIN: SIGNIFICANT CHANGE UP
-  AZTREONAM: SIGNIFICANT CHANGE UP
-  CEFAZOLIN: SIGNIFICANT CHANGE UP
-  CEFEPIME: SIGNIFICANT CHANGE UP
-  CEFOXITIN: SIGNIFICANT CHANGE UP
-  CEFTRIAXONE: SIGNIFICANT CHANGE UP
-  CEFUROXIME: SIGNIFICANT CHANGE UP
-  CIPROFLOXACIN: SIGNIFICANT CHANGE UP
-  ERTAPENEM: SIGNIFICANT CHANGE UP
-  GENTAMICIN: SIGNIFICANT CHANGE UP
-  LEVOFLOXACIN: SIGNIFICANT CHANGE UP
-  MEROPENEM: SIGNIFICANT CHANGE UP
-  NITROFURANTOIN: SIGNIFICANT CHANGE UP
-  PIPERACILLIN/TAZOBACTAM: SIGNIFICANT CHANGE UP
-  TOBRAMYCIN: SIGNIFICANT CHANGE UP
-  TRIMETHOPRIM/SULFAMETHOXAZOLE: SIGNIFICANT CHANGE UP
ANA TITR SER: NEGATIVE — SIGNIFICANT CHANGE UP
METHOD TYPE: SIGNIFICANT CHANGE UP

## 2024-11-29 LAB
-  AMPICILLIN: SIGNIFICANT CHANGE UP
-  CIPROFLOXACIN: SIGNIFICANT CHANGE UP
-  DAPTOMYCIN: SIGNIFICANT CHANGE UP
-  LEVOFLOXACIN: SIGNIFICANT CHANGE UP
-  LINEZOLID: SIGNIFICANT CHANGE UP
-  NITROFURANTOIN: SIGNIFICANT CHANGE UP
-  TETRACYCLINE: SIGNIFICANT CHANGE UP
-  VANCOMYCIN: SIGNIFICANT CHANGE UP
CULTURE RESULTS: ABNORMAL
CULTURE RESULTS: SIGNIFICANT CHANGE UP
METHOD TYPE: SIGNIFICANT CHANGE UP
ORGANISM # SPEC MICROSCOPIC CNT: ABNORMAL
ORGANISM # SPEC MICROSCOPIC CNT: ABNORMAL
ORGANISM # SPEC MICROSCOPIC CNT: SIGNIFICANT CHANGE UP
SPECIMEN SOURCE: SIGNIFICANT CHANGE UP
SPECIMEN SOURCE: SIGNIFICANT CHANGE UP

## 2024-11-29 RX ORDER — AMOXICILLIN/POTASSIUM CLAV 250-125 MG
875 TABLET ORAL
Qty: 14 | Refills: 0
Start: 2024-11-29 | End: 2024-12-05

## 2024-11-29 NOTE — PATIENT PROFILE ADULT - ARE SIGNIFICANT INDICATORS COMPLETE.
Left a message for patient informing her that her INR is 3.57.  She is currently taking 7 mg of Coumadin.  Recommend she hold her Coumadin 7 mg today and restart tomorrow, Saturday, 11/30/2024, at 5 mg daily.  We will recheck her INR next week.  
Yes

## 2024-11-29 NOTE — CDI QUERY NOTE - NSCDIOTHERTXTBX_GEN_ALL_CORE_HH
Clinical documentation and/or evidence of the patient’s presentation, evaluation, and medical management, as evidenced below, may support a cause and effect link that is not documented in the medical record.  In order to ensure accurate coding and accuracy of the clinical record, the documentation in this patient’s medical record requires additional clarification.      If you think the supporting documentation and/or clinical evidence supports a cause and effect link, please include more specific documentation associated with these findings in your progress note and/or discharge summary.    Please clarify if there is a relationship between the suprapubic cath and UTI, such as:  • UTI associated with suprapubic catheter was treated and evaluated 	  • UTI unrelated to suprapubic catheter   • Other (specify)    Supporting documentation and/or clinical evidence:     11/26 Progress Note Adult-Internal Medicine Resident/Attending: ... CT of the abdomen to rule intra abdominal pathologies as well as to rule out pyelonephritis( in a setting of multiple UTI and suprapubic cath) ... Sepsis, present on admission. presumed due to uti; chronic spc    11/27 Discharge Note Provider: ... 69 -year-old male with PMH of cerebral palsy , seizure disorder, BPH, suprapubic catheter ... UTI ... PRINCIPAL DISCHARGE DIAGNOSIS: Acute UTI. Assessment and Plan of Treatment: You were admitted to the hospital for sepsis secondary to UTI infection. You had a white blood cell count of 14,000 on admission. Your urinalaysis showed large leukocyte esterase , many WBC and many bacteria. You were given a dose of ceftriaxone and  started on ciprofloxacin which you will continue to take for 10 days after your discharge. We held your tamsulosin since you have the suprapubic catheter    Lab findings: 11/25 Urine culture-Proteus mirabilis, Enterococcus faecalis (vancomycin resistant)    Per MAR: Rocephin IVPB. Indication: uti. Administered 11/23                 Cefepime IVPB. Indication: complicated UTI. Administered 11/24-11/26                 Ciprofloxacin IVPB. Indication: uti. Administered 11/26-11/27        Thank you,  Jaylene Ortiz RN [facility] CCDS  [phone]

## 2024-11-29 NOTE — CHART NOTE - NSCHARTNOTEFT_GEN_A_CORE
Consult received for "MST Score = />2." Upon chart review, does not currently meet criteria for Protein Calorie Malnutrition risk per MST. Limited weight history available. Current weight taken via bedscale at time of RD visit was 58.6kg, height previously documented to be 147.3cm; BMI calculated to be 27 (within healthy range). Patient aide at bedside reports patient eats well at home; no s/s of GI dysfunction. Last BM was on 11/26/24. RD remains available for assessment per protocol or as needed.    Cabrera Mon RD via Teams.
UTI associated with suprapubic catheter was evaluated and treated
urine culture reviewed- showing proteus and Enterococcus- resistant to cipro   was contacted; states patient is doing well with no complaints  Advised to stop cipro and to switch to augmentin for 7 days; prescription sent to [facility] per request.

## 2024-11-30 ENCOUNTER — INPATIENT (INPATIENT)
Facility: HOSPITAL | Age: 69
LOS: 1 days | Discharge: HOME | DRG: 683 | End: 2024-12-02
Attending: STUDENT IN AN ORGANIZED HEALTH CARE EDUCATION/TRAINING PROGRAM | Admitting: STUDENT IN AN ORGANIZED HEALTH CARE EDUCATION/TRAINING PROGRAM
Payer: MEDICARE

## 2024-11-30 VITALS
OXYGEN SATURATION: 95 % | TEMPERATURE: 98 F | RESPIRATION RATE: 18 BRPM | DIASTOLIC BLOOD PRESSURE: 86 MMHG | HEART RATE: 83 BPM | WEIGHT: 169.98 LBS | SYSTOLIC BLOOD PRESSURE: 133 MMHG

## 2024-11-30 DIAGNOSIS — Z93.59 OTHER CYSTOSTOMY STATUS: Chronic | ICD-10-CM

## 2024-11-30 DIAGNOSIS — Z93.1 GASTROSTOMY STATUS: Chronic | ICD-10-CM

## 2024-11-30 DIAGNOSIS — Z98.890 OTHER SPECIFIED POSTPROCEDURAL STATES: Chronic | ICD-10-CM

## 2024-11-30 PROCEDURE — 99285 EMERGENCY DEPT VISIT HI MDM: CPT | Mod: FS

## 2024-11-30 NOTE — ED ADULT TRIAGE NOTE - CHIEF COMPLAINT QUOTE
Urine is not draining into his bag, also his stomach looks bloated -staff  Patient denies pain or symptoms

## 2024-12-01 DIAGNOSIS — R34 ANURIA AND OLIGURIA: ICD-10-CM

## 2024-12-01 LAB
ALBUMIN SERPL ELPH-MCNC: 3.7 G/DL — SIGNIFICANT CHANGE UP (ref 3.5–5.2)
ALBUMIN SERPL ELPH-MCNC: 4.1 G/DL — SIGNIFICANT CHANGE UP (ref 3.5–5.2)
ALP SERPL-CCNC: 80 U/L — SIGNIFICANT CHANGE UP (ref 30–115)
ALP SERPL-CCNC: 82 U/L — SIGNIFICANT CHANGE UP (ref 30–115)
ALT FLD-CCNC: 15 U/L — SIGNIFICANT CHANGE UP (ref 0–41)
ALT FLD-CCNC: 18 U/L — SIGNIFICANT CHANGE UP (ref 0–41)
ANION GAP SERPL CALC-SCNC: 11 MMOL/L — SIGNIFICANT CHANGE UP (ref 7–14)
ANION GAP SERPL CALC-SCNC: 9 MMOL/L — SIGNIFICANT CHANGE UP (ref 7–14)
APPEARANCE UR: ABNORMAL
AST SERPL-CCNC: 18 U/L — SIGNIFICANT CHANGE UP (ref 0–41)
AST SERPL-CCNC: 24 U/L — SIGNIFICANT CHANGE UP (ref 0–41)
BACTERIA # UR AUTO: ABNORMAL /HPF
BASOPHILS # BLD AUTO: 0.03 K/UL — SIGNIFICANT CHANGE UP (ref 0–0.2)
BASOPHILS # BLD AUTO: 0.04 K/UL — SIGNIFICANT CHANGE UP (ref 0–0.2)
BASOPHILS NFR BLD AUTO: 0.4 % — SIGNIFICANT CHANGE UP (ref 0–1)
BASOPHILS NFR BLD AUTO: 0.6 % — SIGNIFICANT CHANGE UP (ref 0–1)
BILIRUB SERPL-MCNC: <0.2 MG/DL — SIGNIFICANT CHANGE UP (ref 0.2–1.2)
BILIRUB SERPL-MCNC: <0.2 MG/DL — SIGNIFICANT CHANGE UP (ref 0.2–1.2)
BILIRUB UR-MCNC: NEGATIVE — SIGNIFICANT CHANGE UP
BUN SERPL-MCNC: 14 MG/DL — SIGNIFICANT CHANGE UP (ref 10–20)
BUN SERPL-MCNC: 21 MG/DL — HIGH (ref 10–20)
CALCIUM SERPL-MCNC: 9.3 MG/DL — SIGNIFICANT CHANGE UP (ref 8.4–10.5)
CALCIUM SERPL-MCNC: 9.7 MG/DL — SIGNIFICANT CHANGE UP (ref 8.4–10.5)
CAST: 0 /LPF — SIGNIFICANT CHANGE UP (ref 0–4)
CHLORIDE SERPL-SCNC: 101 MMOL/L — SIGNIFICANT CHANGE UP (ref 98–110)
CHLORIDE SERPL-SCNC: 103 MMOL/L — SIGNIFICANT CHANGE UP (ref 98–110)
CO2 SERPL-SCNC: 26 MMOL/L — SIGNIFICANT CHANGE UP (ref 17–32)
CO2 SERPL-SCNC: 31 MMOL/L — SIGNIFICANT CHANGE UP (ref 17–32)
COLOR SPEC: YELLOW — SIGNIFICANT CHANGE UP
CREAT SERPL-MCNC: 0.5 MG/DL — LOW (ref 0.7–1.5)
CREAT SERPL-MCNC: 0.8 MG/DL — SIGNIFICANT CHANGE UP (ref 0.7–1.5)
DIFF PNL FLD: ABNORMAL
EGFR: 110 ML/MIN/1.73M2 — SIGNIFICANT CHANGE UP
EGFR: 96 ML/MIN/1.73M2 — SIGNIFICANT CHANGE UP
EOSINOPHIL # BLD AUTO: 0.28 K/UL — SIGNIFICANT CHANGE UP (ref 0–0.7)
EOSINOPHIL # BLD AUTO: 0.31 K/UL — SIGNIFICANT CHANGE UP (ref 0–0.7)
EOSINOPHIL NFR BLD AUTO: 3.9 % — SIGNIFICANT CHANGE UP (ref 0–8)
EOSINOPHIL NFR BLD AUTO: 4.3 % — SIGNIFICANT CHANGE UP (ref 0–8)
GLUCOSE SERPL-MCNC: 102 MG/DL — HIGH (ref 70–99)
GLUCOSE SERPL-MCNC: 90 MG/DL — SIGNIFICANT CHANGE UP (ref 70–99)
GLUCOSE UR QL: NEGATIVE MG/DL — SIGNIFICANT CHANGE UP
HCT VFR BLD CALC: 36.8 % — LOW (ref 42–52)
HCT VFR BLD CALC: 38.6 % — LOW (ref 42–52)
HGB BLD-MCNC: 12.2 G/DL — LOW (ref 14–18)
HGB BLD-MCNC: 12.9 G/DL — LOW (ref 14–18)
IMM GRANULOCYTES NFR BLD AUTO: 0.4 % — HIGH (ref 0.1–0.3)
IMM GRANULOCYTES NFR BLD AUTO: 0.4 % — HIGH (ref 0.1–0.3)
KETONES UR-MCNC: NEGATIVE MG/DL — SIGNIFICANT CHANGE UP
LEUKOCYTE ESTERASE UR-ACNC: ABNORMAL
LYMPHOCYTES # BLD AUTO: 1.8 K/UL — SIGNIFICANT CHANGE UP (ref 1.2–3.4)
LYMPHOCYTES # BLD AUTO: 1.87 K/UL — SIGNIFICANT CHANGE UP (ref 1.2–3.4)
LYMPHOCYTES # BLD AUTO: 25 % — SIGNIFICANT CHANGE UP (ref 20.5–51.1)
LYMPHOCYTES # BLD AUTO: 26 % — SIGNIFICANT CHANGE UP (ref 20.5–51.1)
MCHC RBC-ENTMCNC: 29.5 PG — SIGNIFICANT CHANGE UP (ref 27–31)
MCHC RBC-ENTMCNC: 30 PG — SIGNIFICANT CHANGE UP (ref 27–31)
MCHC RBC-ENTMCNC: 33.2 G/DL — SIGNIFICANT CHANGE UP (ref 32–37)
MCHC RBC-ENTMCNC: 33.4 G/DL — SIGNIFICANT CHANGE UP (ref 32–37)
MCV RBC AUTO: 88.9 FL — SIGNIFICANT CHANGE UP (ref 80–94)
MCV RBC AUTO: 89.8 FL — SIGNIFICANT CHANGE UP (ref 80–94)
MONOCYTES # BLD AUTO: 0.64 K/UL — HIGH (ref 0.1–0.6)
MONOCYTES # BLD AUTO: 0.67 K/UL — HIGH (ref 0.1–0.6)
MONOCYTES NFR BLD AUTO: 8.9 % — SIGNIFICANT CHANGE UP (ref 1.7–9.3)
MONOCYTES NFR BLD AUTO: 9.3 % — SIGNIFICANT CHANGE UP (ref 1.7–9.3)
NEUTROPHILS # BLD AUTO: 4.32 K/UL — SIGNIFICANT CHANGE UP (ref 1.4–6.5)
NEUTROPHILS # BLD AUTO: 4.37 K/UL — SIGNIFICANT CHANGE UP (ref 1.4–6.5)
NEUTROPHILS NFR BLD AUTO: 60 % — SIGNIFICANT CHANGE UP (ref 42.2–75.2)
NEUTROPHILS NFR BLD AUTO: 60.8 % — SIGNIFICANT CHANGE UP (ref 42.2–75.2)
NITRITE UR-MCNC: NEGATIVE — SIGNIFICANT CHANGE UP
NRBC # BLD: 0 /100 WBCS — SIGNIFICANT CHANGE UP (ref 0–0)
NRBC # BLD: 0 /100 WBCS — SIGNIFICANT CHANGE UP (ref 0–0)
PH UR: 7.5 — SIGNIFICANT CHANGE UP (ref 5–8)
PLATELET # BLD AUTO: 290 K/UL — SIGNIFICANT CHANGE UP (ref 130–400)
PLATELET # BLD AUTO: 293 K/UL — SIGNIFICANT CHANGE UP (ref 130–400)
PMV BLD: 10.3 FL — SIGNIFICANT CHANGE UP (ref 7.4–10.4)
PMV BLD: 10.3 FL — SIGNIFICANT CHANGE UP (ref 7.4–10.4)
POTASSIUM SERPL-MCNC: 4.3 MMOL/L — SIGNIFICANT CHANGE UP (ref 3.5–5)
POTASSIUM SERPL-MCNC: 4.7 MMOL/L — SIGNIFICANT CHANGE UP (ref 3.5–5)
POTASSIUM SERPL-SCNC: 4.3 MMOL/L — SIGNIFICANT CHANGE UP (ref 3.5–5)
POTASSIUM SERPL-SCNC: 4.7 MMOL/L — SIGNIFICANT CHANGE UP (ref 3.5–5)
PROT SERPL-MCNC: 6.7 G/DL — SIGNIFICANT CHANGE UP (ref 6–8)
PROT SERPL-MCNC: 7 G/DL — SIGNIFICANT CHANGE UP (ref 6–8)
PROT UR-MCNC: 300 MG/DL
RBC # BLD: 4.14 M/UL — LOW (ref 4.7–6.1)
RBC # BLD: 4.3 M/UL — LOW (ref 4.7–6.1)
RBC # FLD: 13.3 % — SIGNIFICANT CHANGE UP (ref 11.5–14.5)
RBC # FLD: 13.4 % — SIGNIFICANT CHANGE UP (ref 11.5–14.5)
RBC CASTS # UR COMP ASSIST: 149 /HPF — HIGH (ref 0–4)
SODIUM SERPL-SCNC: 138 MMOL/L — SIGNIFICANT CHANGE UP (ref 135–146)
SODIUM SERPL-SCNC: 143 MMOL/L — SIGNIFICANT CHANGE UP (ref 135–146)
SP GR SPEC: 1.03 — SIGNIFICANT CHANGE UP (ref 1–1.03)
SQUAMOUS # UR AUTO: 2 /HPF — SIGNIFICANT CHANGE UP (ref 0–5)
UROBILINOGEN FLD QL: 0.2 MG/DL — SIGNIFICANT CHANGE UP (ref 0.2–1)
WBC # BLD: 7.19 K/UL — SIGNIFICANT CHANGE UP (ref 4.8–10.8)
WBC # BLD: 7.2 K/UL — SIGNIFICANT CHANGE UP (ref 4.8–10.8)
WBC # FLD AUTO: 7.19 K/UL — SIGNIFICANT CHANGE UP (ref 4.8–10.8)
WBC # FLD AUTO: 7.2 K/UL — SIGNIFICANT CHANGE UP (ref 4.8–10.8)
WBC UR QL: 8 /HPF — HIGH (ref 0–5)

## 2024-12-01 PROCEDURE — 80053 COMPREHEN METABOLIC PANEL: CPT

## 2024-12-01 PROCEDURE — 36415 COLL VENOUS BLD VENIPUNCTURE: CPT

## 2024-12-01 PROCEDURE — 92610 EVALUATE SWALLOWING FUNCTION: CPT | Mod: GN

## 2024-12-01 PROCEDURE — 81001 URINALYSIS AUTO W/SCOPE: CPT

## 2024-12-01 PROCEDURE — 85025 COMPLETE CBC W/AUTO DIFF WBC: CPT

## 2024-12-01 PROCEDURE — 99222 1ST HOSP IP/OBS MODERATE 55: CPT

## 2024-12-01 PROCEDURE — 74177 CT ABD & PELVIS W/CONTRAST: CPT | Mod: 26,MC

## 2024-12-01 RX ORDER — DENOSUMAB 60 MG/ML
60 INJECTION SUBCUTANEOUS
Refills: 0 | DISCHARGE

## 2024-12-01 RX ORDER — BACLOFEN 100 %
10 POWDER (GRAM) MISCELLANEOUS EVERY 6 HOURS
Refills: 0 | Status: DISCONTINUED | OUTPATIENT
Start: 2024-12-01 | End: 2024-12-02

## 2024-12-01 RX ORDER — POVIDONE, POLYVINYL ALCOHOL 20; 27 G/1000ML; G/1000ML
1 SOLUTION OPHTHALMIC
Refills: 0 | Status: DISCONTINUED | OUTPATIENT
Start: 2024-12-01 | End: 2024-12-02

## 2024-12-01 RX ORDER — LYSINE HCL 500 MG
1 TABLET ORAL
Refills: 0 | Status: DISCONTINUED | OUTPATIENT
Start: 2024-12-01 | End: 2024-12-02

## 2024-12-01 RX ORDER — POVIDONE, POLYVINYL ALCOHOL 20; 27 G/1000ML; G/1000ML
1 SOLUTION OPHTHALMIC
Refills: 0 | DISCHARGE

## 2024-12-01 RX ORDER — TAMSULOSIN HYDROCHLORIDE 0.4 MG/1
0.4 CAPSULE ORAL AT BEDTIME
Refills: 0 | Status: DISCONTINUED | OUTPATIENT
Start: 2024-12-01 | End: 2024-12-02

## 2024-12-01 RX ORDER — PIPERACILLIN SODIUM AND TAZOBACTAM SODIUM 4; .5 G/20ML; G/20ML
3.38 INJECTION, POWDER, LYOPHILIZED, FOR SOLUTION INTRAVENOUS ONCE
Refills: 0 | Status: COMPLETED | OUTPATIENT
Start: 2024-12-01 | End: 2024-12-01

## 2024-12-01 RX ORDER — CYANOCOBALAMIN/FOLIC AC/VIT B6 1-2.2-25MG
1 TABLET ORAL DAILY
Refills: 0 | Status: DISCONTINUED | OUTPATIENT
Start: 2024-12-01 | End: 2024-12-02

## 2024-12-01 RX ORDER — OATMEAL 10 MG/G
1 CREAM TOPICAL
Refills: 0 | DISCHARGE

## 2024-12-01 RX ORDER — SENNOSIDES 8.6 MG
2 TABLET ORAL AT BEDTIME
Refills: 0 | Status: DISCONTINUED | OUTPATIENT
Start: 2024-12-01 | End: 2024-12-02

## 2024-12-01 RX ORDER — PIPERACILLIN SODIUM AND TAZOBACTAM SODIUM 4; .5 G/20ML; G/20ML
3.38 INJECTION, POWDER, LYOPHILIZED, FOR SOLUTION INTRAVENOUS ONCE
Refills: 0 | Status: COMPLETED | OUTPATIENT
Start: 2024-12-02 | End: 2024-12-02

## 2024-12-01 RX ORDER — 0.9 % SODIUM CHLORIDE 0.9 %
1000 INTRAVENOUS SOLUTION INTRAVENOUS ONCE
Refills: 0 | Status: COMPLETED | OUTPATIENT
Start: 2024-12-01 | End: 2024-12-01

## 2024-12-01 RX ORDER — PIPERACILLIN SODIUM AND TAZOBACTAM SODIUM 4; .5 G/20ML; G/20ML
3.38 INJECTION, POWDER, LYOPHILIZED, FOR SOLUTION INTRAVENOUS EVERY 6 HOURS
Refills: 0 | Status: DISCONTINUED | OUTPATIENT
Start: 2024-12-02 | End: 2024-12-02

## 2024-12-01 RX ORDER — LYSINE HCL 500 MG
1 TABLET ORAL
Refills: 0 | DISCHARGE

## 2024-12-01 RX ORDER — CALCIUM CARBONATE 500(1250)
1 TABLET ORAL
Refills: 0 | Status: DISCONTINUED | OUTPATIENT
Start: 2024-12-01 | End: 2024-12-02

## 2024-12-01 RX ORDER — AMMONIUM LACTATE 120 MG/G
1 LOTION TOPICAL
Refills: 0 | Status: DISCONTINUED | OUTPATIENT
Start: 2024-12-01 | End: 2024-12-02

## 2024-12-01 RX ORDER — ENOXAPARIN SODIUM 30 MG/.3ML
40 INJECTION SUBCUTANEOUS EVERY 24 HOURS
Refills: 0 | Status: DISCONTINUED | OUTPATIENT
Start: 2024-12-01 | End: 2024-12-02

## 2024-12-01 RX ORDER — MEROPENEM 500 MG/1
1000 INJECTION, POWDER, FOR SOLUTION INTRAVENOUS EVERY 8 HOURS
Refills: 0 | Status: DISCONTINUED | OUTPATIENT
Start: 2024-12-01 | End: 2024-12-01

## 2024-12-01 RX ORDER — LACTULOSE 10 G/15ML
10 SOLUTION ORAL
Refills: 0 | Status: DISCONTINUED | OUTPATIENT
Start: 2024-12-01 | End: 2024-12-02

## 2024-12-01 RX ORDER — MEROPENEM 500 MG/1
1000 INJECTION, POWDER, FOR SOLUTION INTRAVENOUS ONCE
Refills: 0 | Status: COMPLETED | OUTPATIENT
Start: 2024-12-01 | End: 2024-12-01

## 2024-12-01 RX ORDER — POLYETHYLENE GLYCOL 3350 17 G/17G
17 POWDER, FOR SOLUTION ORAL
Refills: 0 | Status: DISCONTINUED | OUTPATIENT
Start: 2024-12-01 | End: 2024-12-02

## 2024-12-01 RX ORDER — MEROPENEM 500 MG/1
INJECTION, POWDER, FOR SOLUTION INTRAVENOUS
Refills: 0 | Status: DISCONTINUED | OUTPATIENT
Start: 2024-12-01 | End: 2024-12-01

## 2024-12-01 RX ORDER — 0.9 % SODIUM CHLORIDE 0.9 %
1000 INTRAVENOUS SOLUTION INTRAVENOUS
Refills: 0 | Status: DISCONTINUED | OUTPATIENT
Start: 2024-12-01 | End: 2024-12-02

## 2024-12-01 RX ORDER — KETOCONAZOLE 20 MG/G
1 CREAM TOPICAL
Refills: 0 | Status: DISCONTINUED | OUTPATIENT
Start: 2024-12-01 | End: 2024-12-02

## 2024-12-01 RX ORDER — ALBUTEROL 90 MCG
2 AEROSOL (GRAM) INHALATION EVERY 6 HOURS
Refills: 0 | Status: DISCONTINUED | OUTPATIENT
Start: 2024-12-01 | End: 2024-12-02

## 2024-12-01 RX ORDER — PANTOPRAZOLE SODIUM 40 MG/1
40 TABLET, DELAYED RELEASE ORAL
Refills: 0 | Status: DISCONTINUED | OUTPATIENT
Start: 2024-12-01 | End: 2024-12-02

## 2024-12-01 RX ORDER — PHENOBARBITAL 16.2 MG/1
64.8 TABLET ORAL DAILY
Refills: 0 | Status: DISCONTINUED | OUTPATIENT
Start: 2024-12-01 | End: 2024-12-02

## 2024-12-01 RX ORDER — CALAMINE
1 LOTION (ML) TOPICAL
Refills: 0 | Status: DISCONTINUED | OUTPATIENT
Start: 2024-12-01 | End: 2024-12-02

## 2024-12-01 RX ORDER — KETOCONAZOLE 20 MG/G
1 CREAM TOPICAL
Refills: 0 | DISCHARGE

## 2024-12-01 RX ORDER — DIPHENHYDRAMINE HCL/CALAMINE
1 LOTION (ML) TOPICAL
Refills: 0 | DISCHARGE

## 2024-12-01 RX ADMIN — POLYETHYLENE GLYCOL 3350 17 GRAM(S): 17 POWDER, FOR SOLUTION ORAL at 17:45

## 2024-12-01 RX ADMIN — Medication 75 MILLILITER(S): at 16:03

## 2024-12-01 RX ADMIN — MEROPENEM 100 MILLIGRAM(S): 500 INJECTION, POWDER, FOR SOLUTION INTRAVENOUS at 08:15

## 2024-12-01 RX ADMIN — PHENOBARBITAL 64.8 MILLIGRAM(S): 16.2 TABLET ORAL at 17:48

## 2024-12-01 RX ADMIN — PIPERACILLIN SODIUM AND TAZOBACTAM SODIUM 25 GRAM(S): 4; .5 INJECTION, POWDER, LYOPHILIZED, FOR SOLUTION INTRAVENOUS at 21:14

## 2024-12-01 RX ADMIN — Medication 1000 MILLILITER(S): at 05:44

## 2024-12-01 RX ADMIN — POVIDONE, POLYVINYL ALCOHOL 1 APPLICATION(S): 20; 27 SOLUTION OPHTHALMIC at 17:45

## 2024-12-01 RX ADMIN — AMMONIUM LACTATE 1 APPLICATION(S): 120 LOTION TOPICAL at 17:43

## 2024-12-01 RX ADMIN — TAMSULOSIN HYDROCHLORIDE 0.4 MILLIGRAM(S): 0.4 CAPSULE ORAL at 21:14

## 2024-12-01 RX ADMIN — PIPERACILLIN SODIUM AND TAZOBACTAM SODIUM 200 GRAM(S): 4; .5 INJECTION, POWDER, LYOPHILIZED, FOR SOLUTION INTRAVENOUS at 17:40

## 2024-12-01 RX ADMIN — LACTULOSE 10 GRAM(S): 10 SOLUTION ORAL at 17:44

## 2024-12-01 RX ADMIN — Medication 10 MILLIGRAM(S): at 17:41

## 2024-12-01 RX ADMIN — Medication 1 TABLET(S): at 17:43

## 2024-12-01 RX ADMIN — Medication 1000 MILLILITER(S): at 08:14

## 2024-12-01 RX ADMIN — Medication 1 TABLET(S): at 17:44

## 2024-12-01 RX ADMIN — ENOXAPARIN SODIUM 40 MILLIGRAM(S): 30 INJECTION SUBCUTANEOUS at 16:03

## 2024-12-01 RX ADMIN — Medication 2 TABLET(S): at 21:14

## 2024-12-01 NOTE — H&P ADULT - HISTORY OF PRESENT ILLNESS
ALLERGIES:  No Known Allergies    MEDICATIONS  (STANDING):  ammonium lactate 12% Lotion 1 Application(s) Topical two times a day  baclofen 10 milliGRAM(s) Oral every 6 hours  calcium carbonate   1250 mG (OsCal) 1 Tablet(s) Oral two times a day  calcium carbonate 1250 mG  + Vitamin D (OsCal 500 + D) 1 Tablet(s) Oral two times a day  enoxaparin Injectable 40 milliGRAM(s) SubCutaneous every 24 hours  ketoconazole 2% Shampoo 1 Application(s) Topical <User Schedule>  lactated ringers. 1000 milliLiter(s) (75 mL/Hr) IV Continuous <Continuous>  lactulose Syrup 10 Gram(s) Oral two times a day  magnesium hydroxide Suspension 30 milliLiter(s) Oral daily  meropenem  IVPB      meropenem  IVPB 1000 milliGRAM(s) IV Intermittent once  meropenem  IVPB 1000 milliGRAM(s) IV Intermittent every 8 hours  mineral oil/petrolatum Hydrophilic Ointment 1 Application(s) Topical daily  multivitamin 1 Tablet(s) Oral daily  pantoprazole    Tablet 40 milliGRAM(s) Oral before breakfast  petrolatum Ophthalmic Ointment 1 Application(s) Both EYES two times a day  PHENobarbital 64.8 milliGRAM(s) Oral daily  polyethylene glycol 3350 17 Gram(s) Oral two times a day  senna 2 Tablet(s) Oral at bedtime  tamsulosin 0.4 milliGRAM(s) Oral at bedtime    MEDICATIONS  (PRN):  albuterol    90 MICROgram(s) HFA Inhaler 2 Puff(s) Inhalation every 6 hours PRN Shortness of Breath and/or Wheezing  calamine/zinc oxide Lotion 1 Application(s) Topical two times a day PRN back dryness    Vital Signs Last 24 Hrs  T(F): 98.2 (01 Dec 2024 09:30), Max: 98.8 (01 Dec 2024 07:30)  HR: 88 (01 Dec 2024 09:30) (83 - 97)  BP: 135/87 (01 Dec 2024 09:30) (124/76 - 135/87)  RR: 18 (01 Dec 2024 09:30) (18 - 18)  SpO2: 97% (01 Dec 2024 09:30) (95% - 97%)  I&O's Summary    01 Dec 2024 07:01  -  01 Dec 2024 14:56  --------------------------------------------------------  IN: 0 mL / OUT: 600 mL / NET: -600 mL      PHYSICAL EXAM:  General: NAD, A/O x 3  ENT: Moist mucous membranes, no thrush  Neck: Supple, No JVD  Lungs: Clear to auscultation bilaterally, good air entry, non-labored breathing  Cardio: RRR, S1/S2, No murmur  Abdomen: Soft, Nontender, distended; Bowel sounds present  Extremities: No calf tenderness, No pitting edema    LABS:                        12.2   7.20  )-----------( 290      ( 01 Dec 2024 12:00 )             36.8     12-01    138  |  101  |  14  ----------------------------<  90  4.3   |  26  |  0.5    Ca    9.3      01 Dec 2024 12:00    TPro  6.7  /  Alb  3.7  /  TBili  <0.2  /  DBili  x   /  AST  18  /  ALT  15  /  AlkPhos  80  12-01    09-07 Chol 169 mg/dL LDL -- HDL 47 mg/dL Trig 110 mg/dL    Urinalysis Basic - ( 01 Dec 2024 12:00 )    Color: x / Appearance: x / SG: x / pH: x  Gluc: 90 mg/dL / Ketone: x  / Bili: x / Urobili: x   Blood: x / Protein: x / Nitrite: x   Leuk Esterase: x / RBC: x / WBC x   Sq Epi: x / Non Sq Epi: x / Bacteria: x        Culture - Urine (collected 25 Nov 2024 12:56)  Source: Clean Catch Clean Catch (Midstream)  Final Report (29 Nov 2024 15:07):    >100,000 CFU/ml Proteus mirabilis    >100,000 CFU/ml Enterococcus faecalis (vancomycin resistant)  Organism: Proteus mirabilis  Enterococcus faecalis (vancomycin resistant) (29 Nov 2024 15:07)  Organism: Enterococcus faecalis (vancomycin resistant) (29 Nov 2024 15:07)      Method Type: MIGUEL A      -  Ampicillin: S <=2 Predicts results to ampicillin/sulbactam, amoxacillin-clavulanate and  piperacillin-tazobactam.      -  Ciprofloxacin: R >2      -  Daptomycin: S 1      -  Levofloxacin: R >4      -  Linezolid: S <=1      -  Nitrofurantoin: S <=32 Should not be used to treat pyelonephritis.      -  Tetracycline: R >8      -  Vancomycin: R >16  Organism: Proteus mirabilis (29 Nov 2024 15:07)      Method Type: MIGUEL A      -  Amoxicillin/Clavulanic Acid: S <=8/4      -  Ampicillin: S <=8 These ampicillin results predict results for amoxicillin      -  Ampicillin/Sulbactam: S <=4/2      -  Aztreonam: S <=4      -  Cefazolin: S <=2 For uncomplicated UTI with K. pneumoniae, E. coli, or P. mirablis: MIGUEL A <=16 is sensitive and MIGUEL A >=32 is resistant. This also predicts results for oral agents cefaclor, cefdinir, cefpodoxime, cefprozil, cefuroxime axetil, cephalexin and locarbef for uncomplicated UTI. Note that some isolates may be susceptible to these agents while testing resistant to cefazolin.      -  Cefepime: S <=2      -  Cefoxitin: S <=8      -  Ceftriaxone: S <=1      -  Cefuroxime: S <=4      -  Ciprofloxacin: R 2      -  Ertapenem: S <=0.5      -  Gentamicin: S <=2      -  Levofloxacin: S <=0.5      -  Meropenem: S <=1      -  Nitrofurantoin: R 64 Should not be used to treat pyelonephritis      -  Piperacillin/Tazobactam: S <=8      -  Tobramycin: S <=2      -  Trimethoprim/Sulfamethoxazole: S <=0.5/9.5   69-year-old male from [facility] with history of CP, seizure disorder, BPH, suprapubic catheter, PEG, bed/wheelchair bound, recent admission for UTI currently on Augmentin presented from [facility] to ER for evaluation due to decreased output from suprapubic catheter today. He was recently DCd from [facility], had a UTI with VRE and Proteus resistant to fluoroquinolones. Also has abdominal distention with chronic constipation. Last Bowel movement yesterday..  He denies any abdominal pain, nausea, vomiting, diarrhea, some chronic constipation, no fever, chills, chronic spasticity. No chest pain, palpitations, dyspnea, dysuria.   In the ED, Vitals wnl, afebrile WBC 7, Hgb 12, , electrolytes wnl, UA borderline  CTAP- Nodular thickening of the bladder dome in the region of the suprapubic catheter has been present since May. This is of indeterminate clinical significance. If previously not performed, further evaluation by urology is recommended to exclude malignancy. Decompressed urinary bladder wall with superimposed diffuse wall thickening which can be due to underdistention. Evaluate for cystitis clinically.  Partial imaged small left pleural effusion with adjacent consolidation/ atelectasis. No bowel obstruction.    Suprapubic catheter exchanged, Given 2 L IVF and meropenem X1 and admitted to medicine    ALLERGIES:  No Known Allergies    MEDICATIONS  (STANDING):  ammonium lactate 12% Lotion 1 Application(s) Topical two times a day  baclofen 10 milliGRAM(s) Oral every 6 hours  calcium carbonate   1250 mG (OsCal) 1 Tablet(s) Oral two times a day  calcium carbonate 1250 mG  + Vitamin D (OsCal 500 + D) 1 Tablet(s) Oral two times a day  enoxaparin Injectable 40 milliGRAM(s) SubCutaneous every 24 hours  ketoconazole 2% Shampoo 1 Application(s) Topical <User Schedule>  lactated ringers. 1000 milliLiter(s) (75 mL/Hr) IV Continuous <Continuous>  lactulose Syrup 10 Gram(s) Oral two times a day  magnesium hydroxide Suspension 30 milliLiter(s) Oral daily  meropenem  IVPB      meropenem  IVPB 1000 milliGRAM(s) IV Intermittent once  meropenem  IVPB 1000 milliGRAM(s) IV Intermittent every 8 hours  mineral oil/petrolatum Hydrophilic Ointment 1 Application(s) Topical daily  multivitamin 1 Tablet(s) Oral daily  pantoprazole    Tablet 40 milliGRAM(s) Oral before breakfast  petrolatum Ophthalmic Ointment 1 Application(s) Both EYES two times a day  PHENobarbital 64.8 milliGRAM(s) Oral daily  polyethylene glycol 3350 17 Gram(s) Oral two times a day  senna 2 Tablet(s) Oral at bedtime  tamsulosin 0.4 milliGRAM(s) Oral at bedtime    MEDICATIONS  (PRN):  albuterol    90 MICROgram(s) HFA Inhaler 2 Puff(s) Inhalation every 6 hours PRN Shortness of Breath and/or Wheezing  calamine/zinc oxide Lotion 1 Application(s) Topical two times a day PRN back dryness    Vital Signs Last 24 Hrs  T(F): 98.2 (01 Dec 2024 09:30), Max: 98.8 (01 Dec 2024 07:30)  HR: 88 (01 Dec 2024 09:30) (83 - 97)  BP: 135/87 (01 Dec 2024 09:30) (124/76 - 135/87)  RR: 18 (01 Dec 2024 09:30) (18 - 18)  SpO2: 97% (01 Dec 2024 09:30) (95% - 97%)  I&O's Summary    01 Dec 2024 07:01  -  01 Dec 2024 14:56  --------------------------------------------------------  IN: 0 mL / OUT: 600 mL / NET: -600 mL      PHYSICAL EXAM:  General: NAD, A/O x 3  ENT: Moist mucous membranes, no thrush  Neck: Supple, No JVD  Lungs: Clear to auscultation bilaterally, good air entry, non-labored breathing  Cardio: RRR, S1/S2, No murmur  Abdomen: Soft, Nontender, distended; Bowel sounds present  Extremities: No calf tenderness, No pitting edema    LABS:                        12.2   7.20  )-----------( 290      ( 01 Dec 2024 12:00 )             36.8     12-01    138  |  101  |  14  ----------------------------<  90  4.3   |  26  |  0.5    Ca    9.3      01 Dec 2024 12:00    TPro  6.7  /  Alb  3.7  /  TBili  <0.2  /  DBili  x   /  AST  18  /  ALT  15  /  AlkPhos  80  12-01 09-07 Chol 169 mg/dL LDL -- HDL 47 mg/dL Trig 110 mg/dL    Urinalysis Basic - ( 01 Dec 2024 12:00 )    Color: x / Appearance: x / SG: x / pH: x  Gluc: 90 mg/dL / Ketone: x  / Bili: x / Urobili: x   Blood: x / Protein: x / Nitrite: x   Leuk Esterase: x / RBC: x / WBC x   Sq Epi: x / Non Sq Epi: x / Bacteria: x        Culture - Urine (collected 25 Nov 2024 12:56)  Source: Clean Catch Clean Catch (Midstream)  Final Report (29 Nov 2024 15:07):    >100,000 CFU/ml Proteus mirabilis    >100,000 CFU/ml Enterococcus faecalis (vancomycin resistant)  Organism: Proteus mirabilis  Enterococcus faecalis (vancomycin resistant) (29 Nov 2024 15:07)  Organism: Enterococcus faecalis (vancomycin resistant) (29 Nov 2024 15:07)      Method Type: MIGUEL A      -  Ampicillin: S <=2 Predicts results to ampicillin/sulbactam, amoxacillin-clavulanate and  piperacillin-tazobactam.      -  Ciprofloxacin: R >2      -  Daptomycin: S 1      -  Levofloxacin: R >4      -  Linezolid: S <=1      -  Nitrofurantoin: S <=32 Should not be used to treat pyelonephritis.      -  Tetracycline: R >8      -  Vancomycin: R >16  Organism: Proteus mirabilis (29 Nov 2024 15:07)      Method Type: MIGUEL A      -  Amoxicillin/Clavulanic Acid: S <=8/4      -  Ampicillin: S <=8 These ampicillin results predict results for amoxicillin      -  Ampicillin/Sulbactam: S <=4/2      -  Aztreonam: S <=4      -  Cefazolin: S <=2 For uncomplicated UTI with K. pneumoniae, E. coli, or P. mirablis: MIGUEL A <=16 is sensitive and MIGUEL A >=32 is resistant. This also predicts results for oral agents cefaclor, cefdinir, cefpodoxime, cefprozil, cefuroxime axetil, cephalexin and locarbef for uncomplicated UTI. Note that some isolates may be susceptible to these agents while testing resistant to cefazolin.      -  Cefepime: S <=2      -  Cefoxitin: S <=8      -  Ceftriaxone: S <=1      -  Cefuroxime: S <=4      -  Ciprofloxacin: R 2      -  Ertapenem: S <=0.5      -  Gentamicin: S <=2      -  Levofloxacin: S <=0.5      -  Meropenem: S <=1      -  Nitrofurantoin: R 64 Should not be used to treat pyelonephritis      -  Piperacillin/Tazobactam: S <=8      -  Tobramycin: S <=2      -  Trimethoprim/Sulfamethoxazole: S <=0.5/9.5

## 2024-12-01 NOTE — CONSULT NOTE ADULT - NS ATTEND AMEND GEN_ALL_CORE FT
Patient seen  CT scan reviewed.  Will need to f/up with Dr. Cooper for cystoscopy and SPT change.  Continue abx f/up urine culture

## 2024-12-01 NOTE — PHARMACOTHERAPY INTERVENTION NOTE - COMMENTS
Spoke with MD regarding Meropenem approval. Informed MD Dr. Herrera recommended doing Zosyn instead of Meropenem, which should provide coverage for enterococcus faecalis as well. Spoke with MD regarding Meropenem approval. Informed MD Dr. Herrera from [facility] recommended doing Zosyn instead of Meropenem, which should provide coverage for enterococcus faecalis as well.

## 2024-12-01 NOTE — CONSULT NOTE ADULT - SUBJECTIVE AND OBJECTIVE BOX
UROLOGY CONSULT:    63 yo M Past PMHx of Cerebral Palsy, Seizure disorder, spastic paraplegia, BPH, Hx of Nephrolithiasis s/p pcnl x 2 with known incompetent bladder neck s/p SPT a/w constipation and decreased urine output -  c/s for CT finding of  Nodular thickening of the bladder dome in the region of the suprapubic catheter concern for malignancy. Patient follows with Dr. Cooper, for Nephrolithiasis s/p PCNL. Last seen in  office for catheter exchange on 2024.     Of note catheter was exchanged by ED on  with minimal output per note.     PAST MEDICAL & SURGICAL HISTORY:  BPH (benign prostatic hyperplasia)      Cerebral palsy      Seizure  last seizure >10 years ago      Osteoporosis      Spastic quadriplegia      Urinary calculi      Urinary retention      Asthma      S/P percutaneous endoscopic gastrostomy (PEG) tube placement      H/O cystoscopy      Suprapubic catheter          MEDICATIONS  (STANDING):    MEDICATIONS  (PRN):      Allergies    No Known Allergies    Intolerances        SOCIAL HISTORY: No illicit drug use    FAMILY HISTORY:      REVIEW OF SYSTEMS:  [ ] Due to altered mental status/intubation, subjective information were not able to be obtained from patient. History was obtained, to the extent possible, from review of the chart and collateral sources of information.     Vital Signs Last 24 Hrs  T(C): 36.8 (01 Dec 2024 09:30), Max: 37.1 (01 Dec 2024 07:30)  T(F): 98.2 (01 Dec 2024 09:30), Max: 98.8 (01 Dec 2024 07:30)  HR: 88 (01 Dec 2024 09:30) (83 - 97)  BP: 135/87 (01 Dec 2024 09:30) (124/76 - 135/87)  BP(mean): --  RR: 18 (01 Dec 2024 09:30) (18 - 18)  SpO2: 97% (01 Dec 2024 09:30) (95% - 97%)    Parameters below as of 01 Dec 2024 09:30  Patient On (Oxygen Delivery Method): room air        PHYSICAL EXAM:      I&O's Summary      LABS:                        12.9   7.19  )-----------( 293      ( 01 Dec 2024 01:13 )             38.6     12-    143  |  103  |  21[H]  ----------------------------<  102[H]  4.7   |  31  |  0.8    Ca    9.7      01 Dec 2024 01:13    TPro  7.0  /  Alb  4.1  /  TBili  <0.2  /  DBili  x   /  AST  24  /  ALT  18  /  AlkPhos  82  12-      Urinalysis Basic - ( 01 Dec 2024 07:45 )    Color: Yellow / Appearance: Turbid / S.030 / pH: x  Gluc: x / Ketone: Negative mg/dL  / Bili: Negative / Urobili: 0.2 mg/dL   Blood: x / Protein: 300 mg/dL / Nitrite: Negative   Leuk Esterase: Trace / RBC: 149 /HPF / WBC 8 /HPF   Sq Epi: x / Non Sq Epi: 2 /HPF / Bacteria: Occasional /HPF      RADIOLOGY & ADDITIONAL STUDIES:  < from: CT Abdomen and Pelvis w/ IV Cont (24 @ 03:59) >  ACC: [id number] EXAM:  CT ABDOMEN AND PELVIS IC   ORDERED BY: JEOVANNY ARROYO     *** ADDENDUM # 1 ***    Jose spoke with JUANJO WHITING on 2024 at 8 am    --- End of Report ---    *** END OF ADDENDUM # 1 ***      PROCEDURE DATE:  2024         INTERPRETATION:  CLINICAL INFORMATION: Abdominal distention.    COMPARISON: CT ABDOMEN 2024, CT abdomen and pelvis 2024.    CONTRAST/COMPLICATIONS:  IV Contrast: Omnipaque 350  95 cc administered   5 cc discarded  Oral Contrast: NONE    PROCEDURE:  CT of the Abdomen and Pelvis was performed.  Sagittal and coronal reformats were performed.    FINDINGS:  LOWER CHEST: Small left pleural effusion with adjacent   consolidation/atelectasis, partial imaged. Elevated left hemidiaphragm.    LIVER: Left hepatic hypodensity too small to further characterize.  BILE DUCTS: Normal caliber.  GALLBLADDER: Unremarkable at CT.  SPLEEN: Within normal limits.  PANCREAS: Within normal limits.  ADRENALS: Within normal limits.  KIDNEYS/URETERS: Symmetric renal enhancement. Contrast visualized in the   right renal pelvis. Nonobstructing right renal calculi.    BLADDER: Suprapubic tube with tip terminating in the bladder.   Underdistended bladder with nonspecific bladder dome wall asymmetric   thickening.  REPRODUCTIVE ORGANS: Unchanged    BOWEL: Gastrostomy tube visualized terminating in the stomach. No bowel   obstruction.  Appendix is normal.  PERITONEUM/RETROPERITONEUM: No significant ascites.  VESSELS: Normal caliber abdominal aorta.  LYMPH NODES: No lymphadenopathy.  ABDOMINAL WALL: Small fat-containing umbilical hernia. Small left fat   containing inguinal hernia. Right inguinal hernia containing fat, vessels   and small amount of fluid.  BONES: Degenerative changes. Chronic left femoral neck fracture. Chronic   bilateral hip deformities.      IMPRESSION:    Nodular thickening of the bladder dome in the region of the suprapubic   catheter has been present since May. This is of indeterminate clinical   significance. If previously not performed, further evaluation by urology   is recommended to exclude malignancy.    Decompressed urinary bladder wall with superimposed diffuse wall   thickening which can be due to underdistention. Evaluate for cystitis   clinically.    Partial imaged small left pleural effusion with adjacent   consolidation/atelectasis.    No bowel obstruction.    --- End of Report ---    ***Please see the addendum at the top of this report. It may contain   additional important information or changes.****      DYLAN PERRIN MD; Resident Radiologist  This document has been electronically signed.  GONZALES FAIR MD; Attending Radiologist  This document has been electronically signed. Dec  1 2024  7:38AM  1st Addendum: GONZALES FAIR MD; Attending Radiologist  The first addendum was electronically signed on: Dec  1 2024  8:22AM.    < end of copied text >   UROLOGY CONSULT:    63 yo M Past PMHx of Cerebral Palsy, Seizure disorder, spastic paraplegia, BPH, Hx of Nephrolithiasis s/p pcnl x 2 with known incompetent bladder neck s/p SPT presented from group home a/w constipation and decreased urine output -  c/s for CT finding of  Nodular thickening of the bladder dome in the region of the suprapubic catheter concern for malignancy. Patient seen and examined at bedside w/ aide. Per aide, patient was visiting a relative and held his bowel movements (typically has 3 BM per day). Patient denies any abodominal pain, flank pain.  Patient follows with Dr. Cooper, for Nephrolithiasis s/p PCNL. Last seen in  office for catheter exchange on 2024.     Of note catheter was exchanged by ED on  with minimal output per note.     PAST MEDICAL & SURGICAL HISTORY:  BPH (benign prostatic hyperplasia)      Cerebral palsy      Seizure  last seizure >10 years ago      Osteoporosis      Spastic quadriplegia      Urinary calculi      Urinary retention      Asthma      S/P percutaneous endoscopic gastrostomy (PEG) tube placement      H/O cystoscopy      Suprapubic catheter          MEDICATIONS  (STANDING):    MEDICATIONS  (PRN):      Allergies    No Known Allergies    Intolerances        SOCIAL HISTORY: No illicit drug use    FAMILY HISTORY:      REVIEW OF SYSTEMS:  [ ] A ten-point review of systems was otherwise negative except as noted.     Vital Signs Last 24 Hrs  T(C): 36.8 (01 Dec 2024 09:30), Max: 37.1 (01 Dec 2024 07:30)  T(F): 98.2 (01 Dec 2024 09:30), Max: 98.8 (01 Dec 2024 07:30)  HR: 88 (01 Dec 2024 09:30) (83 - 97)  BP: 135/87 (01 Dec 2024 09:30) (124/76 - 135/87)  RR: 18 (01 Dec 2024 09:30) (18 - 18)  SpO2: 97% (01 Dec 2024 09:30) (95% - 97%)    Parameters below as of 01 Dec 2024 09:30  Patient On (Oxygen Delivery Method): room air        PHYSICAL EXAM:  GEN: NAD, well-developed, awake and alert.  SKIN: Good color, non diaphoretic.  HEENT: NC/AT.  RESP: No dyspnea, non-labored breathing. No use of accessory muscles.  CARDIO: +S1/S2  /ABDO: Soft, NT/ND, no palpable bladder, no suprapubic tenderness/ + Suprapubic Tube draining  yellow urine.  BACK: No CVAT B/L  MSK: contract body habitus   I&O's Summary      LABS:                        12.9   7.19  )-----------( 293      ( 01 Dec 2024 01:13 )             38.6         143  |  103  |  21[H]  ----------------------------<  102[H]  4.7   |  31  |  0.8    Ca    9.7      01 Dec 2024 01:13    TPro  7.0  /  Alb  4.1  /  TBili  <0.2  /  DBili  x   /  AST  24  /  ALT  18  /  AlkPhos  82        Urinalysis Basic - ( 01 Dec 2024 07:45 )    Color: Yellow / Appearance: Turbid / S.030 / pH: x  Gluc: x / Ketone: Negative mg/dL  / Bili: Negative / Urobili: 0.2 mg/dL   Blood: x / Protein: 300 mg/dL / Nitrite: Negative   Leuk Esterase: Trace / RBC: 149 /HPF / WBC 8 /HPF   Sq Epi: x / Non Sq Epi: 2 /HPF / Bacteria: Occasional /HPF      RADIOLOGY & ADDITIONAL STUDIES:  < from: CT Abdomen and Pelvis w/ IV Cont (24 @ 03:59) >  ACC: [id number] EXAM:  CT ABDOMEN AND PELVIS IC   ORDERED BY: JEOVANNY ARROYO     *** ADDENDUM # 1 ***    Jose spoke with JUANJO WHITING on 2024 at 8 am    --- End of Report ---    *** END OF ADDENDUM # 1 ***      PROCEDURE DATE:  2024         INTERPRETATION:  CLINICAL INFORMATION: Abdominal distention.    COMPARISON: CT ABDOMEN 2024, CT abdomen and pelvis 2024.    CONTRAST/COMPLICATIONS:  IV Contrast: Omnipaque 350  95 cc administered   5 cc discarded  Oral Contrast: NONE    PROCEDURE:  CT of the Abdomen and Pelvis was performed.  Sagittal and coronal reformats were performed.    FINDINGS:  LOWER CHEST: Small left pleural effusion with adjacent   consolidation/atelectasis, partial imaged. Elevated left hemidiaphragm.    LIVER: Left hepatic hypodensity too small to further characterize.  BILE DUCTS: Normal caliber.  GALLBLADDER: Unremarkable at CT.  SPLEEN: Within normal limits.  PANCREAS: Within normal limits.  ADRENALS: Within normal limits.  KIDNEYS/URETERS: Symmetric renal enhancement. Contrast visualized in the   right renal pelvis. Nonobstructing right renal calculi.    BLADDER: Suprapubic tube with tip terminating in the bladder.   Underdistended bladder with nonspecific bladder dome wall asymmetric   thickening.  REPRODUCTIVE ORGANS: Unchanged    BOWEL: Gastrostomy tube visualized terminating in the stomach. No bowel   obstruction.  Appendix is normal.  PERITONEUM/RETROPERITONEUM: No significant ascites.  VESSELS: Normal caliber abdominal aorta.  LYMPH NODES: No lymphadenopathy.  ABDOMINAL WALL: Small fat-containing umbilical hernia. Small left fat   containing inguinal hernia. Right inguinal hernia containing fat, vessels   and small amount of fluid.  BONES: Degenerative changes. Chronic left femoral neck fracture. Chronic   bilateral hip deformities.      IMPRESSION:    Nodular thickening of the bladder dome in the region of the suprapubic   catheter has been present since May. This is of indeterminate clinical   significance. If previously not performed, further evaluation by urology   is recommended to exclude malignancy.    Decompressed urinary bladder wall with superimposed diffuse wall   thickening which can be due to underdistention. Evaluate for cystitis   clinically.    Partial imaged small left pleural effusion with adjacent   consolidation/atelectasis.    No bowel obstruction.    --- End of Report ---    ***Please see the addendum at the top of this report. It may contain   additional important information or changes.****      DYLAN PERRIN MD; Resident Radiologist  This document has been electronically signed.  GONZALES FAIR MD; Attending Radiologist  This document has been electronically signed. Dec  1 2024  7:38AM  1st Addendum: GONZALES FAIR MD; Attending Radiologist  The first addendum was electronically signed on: Dec  1 2024  8:22AM.    < end of copied text >

## 2024-12-01 NOTE — ED PROVIDER NOTE - OBJECTIVE STATEMENT
69-year-old male from [facility] with history of CP, seizure disorder, BPH, suprapubic catheter, PEG, bed/wheelchair bound, recent admission for UTI currently on Augmentin presenting to ER for evaluation.  As per nursing home staff noticed decreased output from suprapubic catheter today.  Patient also with abdominal distention.  He denies any abdominal pain, nausea, vomiting, fever, chills, weakness or any acute complaints in ED.

## 2024-12-01 NOTE — H&P ADULT - ATTENDING COMMENTS
69 -year-old male with history of cerebral palsy , seizure disorder, BPH, suprapubic catheter, PEG tube, bed/wheelchair-bound at baseline was brought to ED from group home for low urine output from his urinary catheter, patient feels ok, he denies abdominal pain, dysuria or SOB, he was recently admitted to the hospital and was treated for UTI, currently on Augmentin, in the ED his suprapbubic catheter was changed, UA positive for blood, CT abdomen showed Nodular thickening of the bladder dome in the region of the suprapubic catheter      PHYSICAL EXAM:   GENERAL: NAD, well-developed.   HEAD:  Atraumatic, Normocephalic.   EYES: EOMI, PERRLA, conjunctiva and sclera clear.   NECK: Supple, right neck tenderness, no erythema,    CHEST/LUNG: Clear to auscultation bilaterally; No wheeze.   HEART: Regular rate and rhythm; S1 S2.    ABDOMEN: Soft, distended, mildly tympanic, suprapubic cath in place. PEG tube in place   EXTREMITIES:  upper extremities and contracted with weakness, LE are weak with muscle atrophy.    PSYCH: AAOx3.   NEUROLOGY: non-focal.   SKIN: No rashes or lesions.     A/P:    Chronic urinary retention with suprapubic catheter:    Recent UTI   s/p Suprapubic cath exchange in the ED, now there is good  urine output.    UA noted with large blood, , WBC 8 possibly traumatic,    Urine is clear   Recent urine cx was growing Proteus Mirabilis and Enterococcus Faecalis   He was started on Augmentin on 11/29 for 7 days, continue   Seen by urology, s/p exchange the catheter in the Ed, recommended to follow up with urology outpatient for abnormal CT finding. Exchange the catheter every 4-6 weeks.      Chronic colonic distention:    CT abdomen showed no bowel obstruction.    Avoid Opiate,    Continue bowel regimen.      Seizure disorder: continue phenobarbital      DVT prophylaxis: Heparin SC.

## 2024-12-01 NOTE — CONSULT NOTE ADULT - ASSESSMENT
65 yo M Past PMHx of Cerebral Palsy, Seizure disorder, spastic paraplegia, BPH, Hx of Nephrolithiasis s/p pcnl x 2 with known incompetent bladder neck s/p SPT a/w constipation and decreased urine output -  c/s for CT finding of  Nodular thickening of the bladder dome in the region of the suprapubic catheter concern for malignancy.  65 yo M Past PMHx of Cerebral Palsy, Seizure disorder, spastic paraplegia, BPH, Hx of Nephrolithiasis s/p pcnl x 2 with known incompetent bladder neck s/p SPT a/w constipation and decreased urine output -  c/s for CT finding of  Nodular thickening of the bladder dome in the region of the suprapubic catheter concern for malignancy.     Plan:  - No acute  intervention at this time  - SPT exchanged by ED on 12/1   - f/u OP with Dr. Cooper for further management and SPT exchange q 4-6 weeks   - Continue care per primary team   - Will d/w attending  65 yo M Past PMHx of Cerebral Palsy, Seizure disorder, spastic paraplegia, BPH, Hx of Nephrolithiasis s/p pcnl x 2 with known incompetent bladder neck s/p SPT a/w constipation and decreased urine output -  c/s for CT finding of  Nodular thickening of the bladder dome in the region of the suprapubic catheter concern for malignancy.     Plan:  - No acute  intervention at this time  - SPT exchanged by ED on 12/1   - f/u OP with Dr. Cooper for further management and SPT exchange q 4-6 weeks and also for cystoscopy to evaluate the bladder wall thickening seen in the CT scan  - agree with antibiotic therapy to treat UTI  - Continue care per primary team

## 2024-12-01 NOTE — ED PROVIDER NOTE - PROGRESS NOTE DETAILS
Patient remained stable in ED, signed out at 5 Am, shift change pending diagnostic studies and reevaluation and dispo. JHON-- suprapubic catheter changed by Dr. Cerna and Dr. Gibbs, with minimal urine outpt afterwards, ultrasound shows collapsed bladder

## 2024-12-01 NOTE — H&P ADULT - ASSESSMENT
69-year-old male from [facility] with history of CP, seizure disorder, BPH, suprapubic catheter, PEG, bed/wheelchair bound, recent admission for UTI currently on Augmentin presented to ER for evaluation due to decreased output from suprapubic catheter today.  Patient also with abdominal distention.  He denies any abdominal pain, nausea, vomiting, diarrhea, some chronic constipation, fever, chills, weakness  In the ED,     Suprapubic catheter exchanged, Given 2 L IVF and meropenem X1 and admitted to medicine    #Cystitis vs colonization in setting of Suprapubic Catheter  #Possible malignancy  #Oliguria  #Constipation  -f/u ID recs  -Monitor for fevers  -IVF 75 cc/hr  -Monitor Output  -Monitor WBC  -Per ID, as per clinical pharmacist, zosyn for now  -On aggressive bowel regimen at home and will continue here  -Per Urology- f/u OP with Dr. Cooper for further management and SPT exchange q 4-6 weeks       #Cerebral Palsy  -c/w home phenobarb, robaxin, nutrients and creams        #GI PPX- Protonix  #DVT PPX- heparin  #Diet - Reg- pureed orally, free water flushes thru PEG - f/u SLP  #Dispo- med/surg 69-year-old male from [facility] with history of CP, seizure disorder, BPH, suprapubic catheter, PEG, bed/wheelchair bound, recent admission for UTI currently on Augmentin presented from [facility] to ER for evaluation due to decreased output from suprapubic catheter today. He was recently DCd from [facility], had a UTI with VRE and Proteus resistant to fluoroquinolones. Also has abdominal distention with chronic constipation. Last Bowel movement yesterday..  He denies any abdominal pain, nausea, vomiting, diarrhea, some chronic constipation, no fever, chills, chronic spasticity. No chest pain, palpitations, dyspnea, dysuria.   In the ED, Vitals wnl, afebrile WBC 7, Hgb 12, , electrolytes wnl, UA borderline    Suprapubic catheter exchanged, Given 2 L IVF and meropenem X1 and admitted to medicine    #Cystitis vs colonization in setting of Suprapubic Catheter  #Possible malignancy  #Oliguria  #Constipation  -f/u ID recs  -Monitor for fevers  -IVF 75 cc/hr  -Monitor Output  -Monitor WBC  -Per ID, as per clinical pharmacist, zosyn for now  -On aggressive bowel regimen at home and will continue here  -Per Urology- f/u OP with Dr. Cooper for further management and SPT exchange q 4-6 weeks       #Cerebral Palsy  -c/w home phenobarb, robaxin, nutrients and creams        #GI PPX- Protonix  #DVT PPX- heparin  #Diet - Reg- pureed orally, free water flushes thru PEG - f/u SLP  #Dispo- med/surg 69-year-old male from [facility] with history of CP, seizure disorder, BPH, suprapubic catheter, PEG, bed/wheelchair bound, recent admission for UTI currently on Augmentin presented from [facility] to ER for evaluation due to decreased output from suprapubic catheter today. He was recently DCd from [facility], had a UTI with VRE and Proteus resistant to fluoroquinolones. Also has abdominal distention with chronic constipation. Last Bowel movement yesterday..  He denies any abdominal pain, nausea, vomiting, diarrhea, some chronic constipation, no fever, chills, chronic spasticity. No chest pain, palpitations, dyspnea, dysuria.   In the ED, Vitals wnl, afebrile WBC 7, Hgb 12, , electrolytes wnl, UA borderline  CTAP- Nodular thickening of the bladder dome in the region of the suprapubic catheter has been present since May. This is of indeterminate clinical significance. If previously not performed, further evaluation by urology is recommended to exclude malignancy. Decompressed urinary bladder wall with superimposed diffuse wall thickening which can be due to underdistention. Evaluate for cystitis clinically.  Partial imaged small left pleural effusion with adjacent consolidation/ atelectasis. No bowel obstruction.    Suprapubic catheter exchanged, Given 2 L IVF and meropenem X1 and admitted to medicine    #Cystitis vs colonization in setting of Suprapubic Catheter  #Possible malignancy  #Oliguria  #Constipation  -f/u ID recs  -Monitor for fevers  -IVF 75 cc/hr  -Monitor Output  -Monitor WBC  -Per ID, as per clinical pharmacist, zosyn for now  -On aggressive bowel regimen at home and will continue here  -Per Urology- f/u OP with Dr. Cooper for further management and SPT exchange q 4-6 weeks     #Cerebral Palsy  -c/w home phenobarb, robaxin, nutrients and creams    #GI PPX- Protonix  #DVT PPX- heparin  #Diet - Reg- pureed orally, free water flushes thru PEG - f/u SLP  #Dispo- med/surg

## 2024-12-01 NOTE — ED PROVIDER NOTE - ATTENDING APP SHARED VISIT CONTRIBUTION OF CARE
Patient sent from group home for evaluation of constipation, decreased urine out put in the Edouard catheter and abdomen appeared distended. Patient is c/o abdominal pain. Denies f/c/n/v. Denies any other associated symptoms.   Vitals reviewed.   Lungs: CTA, no wheezing, no crackles.  Abd: +BS, +generalized tenderness, ND, soft,   A/P: Abdominal pain/constipation,   Decreased urine out put in to the Edouard bag,   Labs, CT, IVF,   reevaluation.

## 2024-12-01 NOTE — ED PROVIDER NOTE - CLINICAL SUMMARY MEDICAL DECISION MAKING FREE TEXT BOX
Throughout ED observation period, pt remained clinically and hemodynamically stable.  labs w/o acute findigns  spc clogged- exchanged in ed  ct w/o acute fidnings  fluids given, however, minimal UOP  will admit for monitoring, suspected uti causing obstruction of spc

## 2024-12-01 NOTE — ED PROVIDER NOTE - PHYSICAL EXAMINATION
CONSTITUTIONAL: Well-appearing; well-nourished; in no apparent distress.   EYES: PERRL; EOM intact.   ENT: normal nose; no rhinorrhea; normal pharynx with no tonsillar hypertrophy.   NECK: Supple; non-tender; no cervical lymphadenopathy.   CARDIOVASCULAR: Normal S1, S2; no murmurs, rubs, or gallops. Equal radial pulses  RESPIRATORY: Normal chest excursion with respiration; breath sounds clear and equal bilaterally; no wheezes, rhonchi, or rales.  GI/: Normal bowel sounds; + mild abd distention. No ttp. no rebound or guarding. PEG and SPC in place without any cellulitic changes  MS: No evidence of trauma or deformity. . Normal ROM in all four extremities; non-tender to palpation; distal pulses are normal.   SKIN: Normal for age and race; warm; dry; good turgor; no apparent lesions or exudate.   NEURO/PSYCH: A & O x 4; grossly unremarkable. mood and manner are appropriate. Grooming and personal hygiene are appropriate. No apparent thoughts of harm to self or others.

## 2024-12-02 ENCOUNTER — TRANSCRIPTION ENCOUNTER (OUTPATIENT)
Age: 69
End: 2024-12-02

## 2024-12-02 VITALS
RESPIRATION RATE: 18 BRPM | HEART RATE: 92 BPM | DIASTOLIC BLOOD PRESSURE: 97 MMHG | SYSTOLIC BLOOD PRESSURE: 155 MMHG | TEMPERATURE: 99 F

## 2024-12-02 LAB
BASOPHILS # BLD AUTO: 0.03 K/UL — SIGNIFICANT CHANGE UP (ref 0–0.2)
BASOPHILS NFR BLD AUTO: 0.5 % — SIGNIFICANT CHANGE UP (ref 0–1)
EOSINOPHIL # BLD AUTO: 0.35 K/UL — SIGNIFICANT CHANGE UP (ref 0–0.7)
EOSINOPHIL NFR BLD AUTO: 6.3 % — SIGNIFICANT CHANGE UP (ref 0–8)
HCT VFR BLD CALC: 35.5 % — LOW (ref 42–52)
HGB BLD-MCNC: 12 G/DL — LOW (ref 14–18)
IMM GRANULOCYTES NFR BLD AUTO: 0.5 % — HIGH (ref 0.1–0.3)
LYMPHOCYTES # BLD AUTO: 1.62 K/UL — SIGNIFICANT CHANGE UP (ref 1.2–3.4)
LYMPHOCYTES # BLD AUTO: 29 % — SIGNIFICANT CHANGE UP (ref 20.5–51.1)
MCHC RBC-ENTMCNC: 30.2 PG — SIGNIFICANT CHANGE UP (ref 27–31)
MCHC RBC-ENTMCNC: 33.8 G/DL — SIGNIFICANT CHANGE UP (ref 32–37)
MCV RBC AUTO: 89.2 FL — SIGNIFICANT CHANGE UP (ref 80–94)
MONOCYTES # BLD AUTO: 0.49 K/UL — SIGNIFICANT CHANGE UP (ref 0.1–0.6)
MONOCYTES NFR BLD AUTO: 8.8 % — SIGNIFICANT CHANGE UP (ref 1.7–9.3)
NEUTROPHILS # BLD AUTO: 3.07 K/UL — SIGNIFICANT CHANGE UP (ref 1.4–6.5)
NEUTROPHILS NFR BLD AUTO: 54.9 % — SIGNIFICANT CHANGE UP (ref 42.2–75.2)
NRBC # BLD: 0 /100 WBCS — SIGNIFICANT CHANGE UP (ref 0–0)
PLATELET # BLD AUTO: 272 K/UL — SIGNIFICANT CHANGE UP (ref 130–400)
PMV BLD: 10.5 FL — HIGH (ref 7.4–10.4)
RBC # BLD: 3.98 M/UL — LOW (ref 4.7–6.1)
RBC # FLD: 13.4 % — SIGNIFICANT CHANGE UP (ref 11.5–14.5)
WBC # BLD: 5.59 K/UL — SIGNIFICANT CHANGE UP (ref 4.8–10.8)
WBC # FLD AUTO: 5.59 K/UL — SIGNIFICANT CHANGE UP (ref 4.8–10.8)

## 2024-12-02 PROCEDURE — 99232 SBSQ HOSP IP/OBS MODERATE 35: CPT

## 2024-12-02 RX ORDER — DOCUSATE SODIUM 100 MG
30 CAPSULE ORAL
Qty: 0 | Refills: 0 | DISCHARGE

## 2024-12-02 RX ORDER — METHENAMINE HIPPURATE 1 G/1
1 TABLET ORAL
Refills: 0 | DISCHARGE

## 2024-12-02 RX ORDER — SENNOSIDES 8.6 MG
2 TABLET ORAL
Qty: 0 | Refills: 0 | DISCHARGE
Start: 2024-12-02

## 2024-12-02 RX ORDER — CRANBERRY FRUIT 1000 MG
450 CAPSULE ORAL
Refills: 0 | DISCHARGE

## 2024-12-02 RX ORDER — PANTOPRAZOLE SODIUM 40 MG/1
1 TABLET, DELAYED RELEASE ORAL
Qty: 0 | Refills: 0 | DISCHARGE
Start: 2024-12-02

## 2024-12-02 RX ORDER — CALCIUM CARBONATE 500(1250)
1 TABLET ORAL
Refills: 0 | DISCHARGE

## 2024-12-02 RX ORDER — LYSINE HCL 500 MG
1 TABLET ORAL
Qty: 60 | Refills: 0
Start: 2024-12-02 | End: 2024-12-31

## 2024-12-02 RX ORDER — TAMSULOSIN HYDROCHLORIDE 0.4 MG/1
1 CAPSULE ORAL
Refills: 0 | DISCHARGE

## 2024-12-02 RX ADMIN — Medication 1 TABLET(S): at 05:08

## 2024-12-02 RX ADMIN — KETOCONAZOLE 1 APPLICATION(S): 20 CREAM TOPICAL at 12:30

## 2024-12-02 RX ADMIN — POLYETHYLENE GLYCOL 3350 17 GRAM(S): 17 POWDER, FOR SOLUTION ORAL at 05:11

## 2024-12-02 RX ADMIN — Medication 30 MILLILITER(S): at 12:12

## 2024-12-02 RX ADMIN — Medication 1 APPLICATION(S): at 12:36

## 2024-12-02 RX ADMIN — Medication 10 MILLIGRAM(S): at 13:50

## 2024-12-02 RX ADMIN — AMMONIUM LACTATE 1 APPLICATION(S): 120 LOTION TOPICAL at 05:11

## 2024-12-02 RX ADMIN — Medication 10 MILLIGRAM(S): at 05:08

## 2024-12-02 RX ADMIN — PANTOPRAZOLE SODIUM 40 MILLIGRAM(S): 40 TABLET, DELAYED RELEASE ORAL at 05:08

## 2024-12-02 RX ADMIN — PHENOBARBITAL 64.8 MILLIGRAM(S): 16.2 TABLET ORAL at 12:12

## 2024-12-02 RX ADMIN — PIPERACILLIN SODIUM AND TAZOBACTAM SODIUM 25 GRAM(S): 4; .5 INJECTION, POWDER, LYOPHILIZED, FOR SOLUTION INTRAVENOUS at 05:11

## 2024-12-02 RX ADMIN — Medication 1 TABLET(S): at 12:12

## 2024-12-02 RX ADMIN — PIPERACILLIN SODIUM AND TAZOBACTAM SODIUM 25 GRAM(S): 4; .5 INJECTION, POWDER, LYOPHILIZED, FOR SOLUTION INTRAVENOUS at 02:25

## 2024-12-02 RX ADMIN — LACTULOSE 10 GRAM(S): 10 SOLUTION ORAL at 05:08

## 2024-12-02 NOTE — DISCHARGE NOTE PROVIDER - NSDCMRMEDTOKEN_GEN_ALL_CORE_FT
Albuterol (Eqv-ProAir HFA) 90 mcg/inh inhalation aerosol: 2 puff(s) inhaled every 6 hours  ammonium lactate topical lotion: Apply topically to affected area 2 times a day to feet  Aveeno Daily Moisturizing Bath topical powder: Apply topically to affected area once a day  baclofen 10 mg oral tablet: 1 tab(s) orally 4 times a day  Caladryl 8%-1% topical lotion: Apply topically to affected area 2 times a day as needed for  back dryness  Cranberry: 450 milligram(s) orally once a day  DermaPhor topical ointment: Apply topically to affected area once a day  ketoconazole 2% topical shampoo: Apply topically to affected area 3 times a week  lactulose 10 g/15 mL oral syrup: 15 milliliter(s) orally once a day  magnesium hydroxide: 30 milliliter(s) orally once a day  methenamine hippurate 1 g oral tablet: 1 tab(s) orally once a day  miconazole 2% topical powder: Apply topically to affected area 2 times a day  ocular lubricant: 1 application in each eye 2 times a day  omeprazole 40 mg oral delayed release capsule: 1 cap(s) orally once a day  Oyster Shell Calcium with Vitamin D 500 mg-5 mcg (200 intl units) oral tablet: 1 tab(s) orally 2 times a day  Pedia-Lax Stool Softener 50 mg/15 mL oral syrup: 30 milliliter(s) orally once a day  PHENobarbital 64.8 mg oral tablet: 1 tab(s) orally once a day via G tube  Prolia 60 mg/mL subcutaneous solution: 60 milligram(s) subcutaneously every 6 months  Refresh ophthalmic solution: 1 drop(s) in each eye 2 times a day  senna leaf extract oral tablet: 2 tab(s) orally once a day (at bedtime)   Albuterol (Eqv-ProAir HFA) 90 mcg/inh inhalation aerosol: 2 puff(s) inhaled every 6 hours  ammonium lactate topical lotion: Apply topically to affected area 2 times a day to feet  Aveeno Daily Moisturizing Bath topical powder: Apply topically to affected area once a day  baclofen 10 mg oral tablet: 1 tab(s) orally 4 times a day  Caladryl 8%-1% topical lotion: Apply topically to affected area 2 times a day as needed for  back dryness  DermaPhor topical ointment: Apply topically to affected area once a day  ketoconazole 2% topical shampoo: Apply topically to affected area 3 times a week  lactulose 10 g/15 mL oral syrup: 15 milliliter(s) orally once a day  magnesium hydroxide: 30 milliliter(s) orally once a day  miconazole 2% topical powder: Apply topically to affected area 2 times a day  ocular lubricant: 1 application in each eye 2 times a day  Oyster Shell Calcium with Vitamin D 500 mg-5 mcg (200 intl units) oral tablet: 1 tab(s) orally 2 times a day  pantoprazole 40 mg oral delayed release tablet: 1 tab(s) orally once a day (before a meal) as needed for acid reflux  Pedia-Lax Stool Softener 50 mg/15 mL oral syrup: 30 milliliter(s) orally once a day as needed for  constipation  PHENobarbital 64.8 mg oral tablet: 1 tab(s) orally once a day via G tube  Prolia 60 mg/mL subcutaneous solution: 60 milligram(s) subcutaneously every 6 months  Refresh ophthalmic solution: 1 drop(s) in each eye 2 times a day  senna leaf extract oral tablet: 2 tab(s) orally once a day (at bedtime)

## 2024-12-02 NOTE — DISCHARGE NOTE PROVIDER - CARE PROVIDERS DIRECT ADDRESSES
,[email or web address]

## 2024-12-02 NOTE — DISCHARGE NOTE NURSING/CASE MANAGEMENT/SOCIAL WORK - NSDCPEFALRISK_GEN_ALL_CORE
For information on Fall & Injury Prevention, visit: [email or web address] OR  [email or web address] OR  https://www.cdc.gov/steadi/patient.html

## 2024-12-02 NOTE — DISCHARGE NOTE PROVIDER - CARE PROVIDER_API CALL
Haroldo Sánchez  [facility] Medicine  [address] [place] [address]  Phone: [phone]  Fax: [phone]  Established Patient  Follow Up Time: 2 weeks   Haroldo Sánchez  Family Medicine  [address] [place] [address]  Phone: [phone]  Fax: [phone]  Established Patient  Follow Up Time: 2 weeks    Bonnie Cooper  Urology  [address] [place] [address]  Phone: [phone]  Fax: [phone]  Established Patient  Follow Up Time: 1 month

## 2024-12-02 NOTE — DISCHARGE NOTE PROVIDER - NSDCFUSCHEDAPPT_GEN_ALL_CORE_FT
[facility] Physician [facility]  UROLOGY [address] [place] [address]  Scheduled Appointment: 12/17/2024

## 2024-12-02 NOTE — CONSULT NOTE ADULT - CONSULT REASON
UTI
CT finding of  Nodular thickening of the bladder dome in the region of the suprapubic catheter concern for malignancy.

## 2024-12-02 NOTE — DISCHARGE NOTE PROVIDER - ATTENDING DISCHARGE PHYSICAL EXAMINATION:
T(C): 36.7 (12-02-24 @ 05:58), Max: 36.9 (12-01-24 @ 21:34)  HR: 80 (12-02-24 @ 05:58) (80 - 101)  BP: 137/89 (12-02-24 @ 05:58) (109/65 - 137/89)  RR: 18 (12-02-24 @ 05:58) (18 - 18)  SpO2: --    CONSTITUTIONAL: NAD  EYES: PERRLA and symmetric, EOMI  RESP: No respiratory distress, CTA b/l, no WRR  CV: RRR, +S1S2, no peripheral edema  GI: Soft, NT, ND, no rebound, no guarding; suprapubic catheter in place  MSK: extremities are mildly contracted  NEURO: cooperative, follows simple commands

## 2024-12-02 NOTE — DISCHARGE NOTE PROVIDER - HOSPITAL COURSE
69-year-old male from [facility] with history of CP, seizure disorder, BPH, suprapubic catheter, PEG, bed/wheelchair bound, recent admission for UTI currently on Augmentin presented from [facility] to ER for evaluation due to decreased output from suprapubic catheter today. He was recently DCd from [facility], had a UTI with VRE and Proteus resistant to fluoroquinolones. Also has abdominal distention with chronic constipation. Last Bowel movement yesterday..  He denies any abdominal pain, nausea, vomiting, diarrhea, some chronic constipation, no fever, chills, chronic spasticity. No chest pain, palpitations, dyspnea, dysuria.   In the ED, Vitals wnl, afebrile WBC 7, Hgb 12, , electrolytes wnl, UA borderline  CTAP- Nodular thickening of the bladder dome in the region of the suprapubic catheter has been present since May. This is of indeterminate clinical significance. If previously not performed, further evaluation by urology is recommended to exclude malignancy. Decompressed urinary bladder wall with superimposed diffuse wall thickening which can be due to underdistention. Evaluate for cystitis clinically.  Partial imaged small left pleural effusion with adjacent consolidation/ atelectasis. No bowel obstruction.    Suprapubic catheter exchanged, Given 2 L IVF and meropenem X1 and admitted to medicine    #colonization in setting of Suprapubic Catheter  #Oliguria  #Constipation  -CTAP- Nodular thickening of the bladder dome in the region of the suprapubic catheter has been present since May. This is of indeterminate clinical significance. If previously not performed, further evaluation by urology is recommended to exclude malignancy. Decompressed urinary bladder wall with superimposed diffuse wall thickening which can be due to underdistention. Evaluate for cystitis clinically.  -s/p zosyn  - ID recs: Monitor off abx  -Afebrile since admission, no white count  -IVF 75 cc/hr  -Monitor urine Output  -On aggressive bowel regimen at home and will continue here  -Per Urology- f/u OP with Dr. Cooper for further management and SPT exchange q 4-6 weeks     #Cerebral Palsy  -c/w home phenobarb, robaxin, nutrients and creams   69-year-old male from [facility] with history of CP, seizure disorder, BPH, suprapubic catheter, PEG, bed/wheelchair bound, recent admission for UTI currently on Augmentin presented from [facility] to ER for evaluation due to decreased output from suprapubic catheter today. He was recently DCd from [facility], had a UTI with VRE and Proteus resistant to fluoroquinolones. Also has abdominal distention with chronic constipation. Last Bowel movement yesterday..  He denies any abdominal pain, nausea, vomiting, diarrhea, some chronic constipation, no fever, chills, chronic spasticity. No chest pain, palpitations, dyspnea, dysuria.   In the ED, Vitals wnl, afebrile WBC 7, Hgb 12, , electrolytes wnl, UA borderline  CTAP- Nodular thickening of the bladder dome in the region of the suprapubic catheter has been present since May. This is of indeterminate clinical significance. If previously not performed, further evaluation by urology is recommended to exclude malignancy. Decompressed urinary bladder wall with superimposed diffuse wall thickening which can be due to underdistention. Evaluate for cystitis clinically.  Partial imaged small left pleural effusion with adjacent consolidation/ atelectasis. No bowel obstruction.    Suprapubic catheter exchanged, Given 2 L IVF and meropenem X1 and admitted to medicine    #colonization in setting of Suprapubic Catheter  #Constipation  -CTAP- Nodular thickening of the bladder dome in the region of the suprapubic catheter has been present since May. This is of indeterminate clinical significance. If previously not performed, further evaluation by urology is recommended to exclude malignancy. Decompressed urinary bladder wall with superimposed diffuse wall thickening which can be due to underdistention. Evaluate for cystitis clinically.  -s/p zosyn  - ID recs: Monitor off abx  -Afebrile since admission, no white count  -IVF 75 cc/hr  -Monitor urine Output  -On aggressive bowel regimen at home and will continue here  -Per Urology- f/u OP with Dr. Cooper for further management and SPT exchange q 4-6 weeks     #Cerebral Palsy  -c/w home phenobarb, robaxin, nutrients and creams

## 2024-12-02 NOTE — DISCHARGE NOTE PROVIDER - DISCHARGING ATTENDING PHYSICIAN:
Call to schedule your imaging:  [facility] [phone]    
Germania Ross, DO

## 2024-12-02 NOTE — DISCHARGE NOTE PROVIDER - NSDCCPCAREPLAN_GEN_ALL_CORE_FT
PRINCIPAL DISCHARGE DIAGNOSIS  Diagnosis: Oliguria  Assessment and Plan of Treatment: You were admitted for oliguria (low urine output) and treated with intravenous fluids. After discharge, monitor your urine output closely, aiming for at least 30-50 mL per hour. Drink plenty of fluids, especially water, to stay hydrated, but avoid alcohol and caffeine. Watch for signs of dehydration such as dark urine, dry mouth, dizziness, or fatigue. Be alert for symptoms of urinary tract infection like burning sensation, frequent urination, or fever. Limit salt intake, take prescribed medications as directed, and follow up with your doctor as scheduled. Seek immediate medical attention if you experience a severe decrease in urine output, swelling in legs or feet, shortness of breath, chest pain, or confusion.  Follow up with your urologist for care of your SPC     PRINCIPAL DISCHARGE DIAGNOSIS  Diagnosis: Oliguria  Assessment and Plan of Treatment: You were admitted for oliguria (low urine output) and treated with intravenous fluids. After discharge, monitor your urine output closely, aiming for at least 30-50 mL per hour. Drink plenty of fluids, especially water, to stay hydrated, but avoid alcohol and caffeine. Watch for signs of dehydration such as dark urine, dry mouth, dizziness, or fatigue. Be alert for symptoms of urinary tract infection like burning sensation, frequent urination, or fever. Limit salt intake, take prescribed medications as directed, and follow up with your doctor as scheduled. Seek immediate medical attention if you experience a severe decrease in urine output, swelling in legs or feet, shortness of breath, chest pain, or confusion.  On your CT abdomen, bladder thickening was found. Urology were cs and they recommended OP follow up . Hence, Follow up with your urologist for care of your SPC and for cystoscopy.

## 2024-12-02 NOTE — DISCHARGE NOTE NURSING/CASE MANAGEMENT/SOCIAL WORK - FINANCIAL ASSISTANCE
[facility] provides services at a reduced cost to those who are determined to be eligible through [facility]’s financial assistance program. Information regarding [facility]’s financial assistance program can be found by going to [email or web address] or by calling [phone].

## 2024-12-02 NOTE — DISCHARGE NOTE PROVIDER - PROVIDER TOKENS
PROVIDER:[TOKEN:[[id number]],FOLLOWUP:[2 weeks],ESTABLISHEDPATIENT:[T]] PROVIDER:[TOKEN:[[id number]],FOLLOWUP:[2 weeks],ESTABLISHEDPATIENT:[T]],PROVIDER:[TOKEN:[[id number]],FOLLOWUP:[1 month],ESTABLISHEDPATIENT:[T]]

## 2024-12-02 NOTE — SWALLOW BEDSIDE ASSESSMENT ADULT - SLP PERTINENT HISTORY OF CURRENT PROBLEM
69-year-old male from [facility] with history of CP, seizure disorder, BPH, suprapubic catheter, PEG, bed/wheelchair bound, recent admission for UTI currently on Augmentin presented from [facility] to ER for evaluation due to decreased output from suprapubic catheter today. He was recently DCd from [facility], had a UTI with VRE and Proteus resistant to fluoroquinolones. Also has abdominal distention with chronic constipation. admitted to medicine#Cystitis vs colonization in setting of Suprapubic Catheter #Possible malignancy #Oliguria

## 2024-12-02 NOTE — CONSULT NOTE ADULT - ASSESSMENT
69-year-old male from halfway with history of CP, seizure disorder, BPH, suprapubic catheter, PEG, bed/wheelchair bound, recent admission for UTI currently on Augmentin presented from halfway to ER for evaluation due to decreased output from suprapubic catheter today.    IMPRESSION  #malfunction of SPC  no clinical symptoms or systemic symptoms to suggest UTI    UA without significant pyuria WBC 8     11/25 UA without significant pyuria WBC 22; UCX   >100,000 CFU/ml Proteus mirabilis    >100,000 CFU/ml Enterococcus faecalis (vancomycin resistant)  #CP/seizure d/o  #Immunodeficiency secondary to senescence which could result in poor clinical outcome     RECOMMENDATIONS  - Monitor off antimicrobials     If any questions, please text or call on [employer] Teams  Please continue to update ID with any pertinent new clinical, laboratory or radiographic findings

## 2024-12-02 NOTE — CONSULT NOTE ADULT - TIME BILLING
I have personally seen and examined this patient.  I have reviewed all pertinent clinical information and reviewed all relevant imaging and diagnostic studies personally.   I counseled the patient about diagnostic testing and treatment plan.   I discussed my recommendations with the primary team Dr Hidalgo at bedside.

## 2024-12-02 NOTE — DISCHARGE NOTE NURSING/CASE MANAGEMENT/SOCIAL WORK - PATIENT PORTAL LINK FT
You can access the FollowMyHealth Patient Portal offered by [facility] by registering at the following website: [email or web address]. By joining [employer]’s FollowMyHealth portal, you will also be able to view your health information using other applications (apps) compatible with our system.

## 2024-12-02 NOTE — CONSULT NOTE ADULT - SUBJECTIVE AND OBJECTIVE BOX
TISH SHAIKH  69y, Male  Allergy: No Known Allergies      CHIEF COMPLAINT:   Oliguria/ Cystitis (02 Dec 2024 09:00)      LOS  1d    HPI  HPI:  69-year-old male from group home with history of CP, seizure disorder, BPH, suprapubic catheter, PEG, bed/wheelchair bound, recent admission for UTI currently on Augmentin presented from [facility] to ER for evaluation due to decreased output from suprapubic catheter today. He was recently DCd from [facility], had a UTI with VRE and Proteus resistant to fluoroquinolones. Also has abdominal distention with chronic constipation. Last Bowel movement yesterday..  He denies any abdominal pain, nausea, vomiting, diarrhea, some chronic constipation, no fever, chills, chronic spasticity. No chest pain, palpitations, dyspnea, dysuria.   In the ED, Vitals wnl, afebrile WBC 7, Hgb 12, , electrolytes wnl, UA borderline  CTAP- Nodular thickening of the bladder dome in the region of the suprapubic catheter has been present since May. This is of indeterminate clinical significance. If previously not performed, further evaluation by urology is recommended to exclude malignancy. Decompressed urinary bladder wall with superimposed diffuse wall thickening which can be due to underdistention. Evaluate for cystitis clinically.  Partial imaged small left pleural effusion with adjacent consolidation/ atelectasis. No bowel obstruction.    Suprapubic catheter exchanged, Given 2 L IVF and meropenem X1 and admitted to medicine    ALLERGIES:  No Known Allergies    MEDICATIONS  (STANDING):  ammonium lactate 12% Lotion 1 Application(s) Topical two times a day  baclofen 10 milliGRAM(s) Oral every 6 hours  calcium carbonate   1250 mG (OsCal) 1 Tablet(s) Oral two times a day  calcium carbonate 1250 mG  + Vitamin D (OsCal 500 + D) 1 Tablet(s) Oral two times a day  enoxaparin Injectable 40 milliGRAM(s) SubCutaneous every 24 hours  ketoconazole 2% Shampoo 1 Application(s) Topical <User Schedule>  lactated ringers. 1000 milliLiter(s) (75 mL/Hr) IV Continuous <Continuous>  lactulose Syrup 10 Gram(s) Oral two times a day  magnesium hydroxide Suspension 30 milliLiter(s) Oral daily  meropenem  IVPB      meropenem  IVPB 1000 milliGRAM(s) IV Intermittent once  meropenem  IVPB 1000 milliGRAM(s) IV Intermittent every 8 hours  mineral oil/petrolatum Hydrophilic Ointment 1 Application(s) Topical daily  multivitamin 1 Tablet(s) Oral daily  pantoprazole    Tablet 40 milliGRAM(s) Oral before breakfast  petrolatum Ophthalmic Ointment 1 Application(s) Both EYES two times a day  PHENobarbital 64.8 milliGRAM(s) Oral daily  polyethylene glycol 3350 17 Gram(s) Oral two times a day  senna 2 Tablet(s) Oral at bedtime  tamsulosin 0.4 milliGRAM(s) Oral at bedtime    MEDICATIONS  (PRN):  albuterol    90 MICROgram(s) HFA Inhaler 2 Puff(s) Inhalation every 6 hours PRN Shortness of Breath and/or Wheezing  calamine/zinc oxide Lotion 1 Application(s) Topical two times a day PRN back dryness    Vital Signs Last 24 Hrs  T(F): 98.2 (01 Dec 2024 09:30), Max: 98.8 (01 Dec 2024 07:30)  HR: 88 (01 Dec 2024 09:30) (83 - 97)  BP: 135/87 (01 Dec 2024 09:30) (124/76 - 135/87)  RR: 18 (01 Dec 2024 09:30) (18 - 18)  SpO2: 97% (01 Dec 2024 09:30) (95% - 97%)  I&O's Summary    01 Dec 2024 07:01  -  01 Dec 2024 14:56  --------------------------------------------------------  IN: 0 mL / OUT: 600 mL / NET: -600 mL      PHYSICAL EXAM:  General: NAD, A/O x 3  ENT: Moist mucous membranes, no thrush  Neck: Supple, No JVD  Lungs: Clear to auscultation bilaterally, good air entry, non-labored breathing  Cardio: RRR, S1/S2, No murmur  Abdomen: Soft, Nontender, distended; Bowel sounds present  Extremities: No calf tenderness, No pitting edema    LABS:                        12.2   7.20  )-----------( 290      ( 01 Dec 2024 12:00 )             36.8     12-01    138  |  101  |  14  ----------------------------<  90  4.3   |  26  |  0.5    Ca    9.3      01 Dec 2024 12:00    TPro  6.7  /  Alb  3.7  /  TBili  <0.2  /  DBili  x   /  AST  18  /  ALT  15  /  AlkPhos  80  12-01    09-07 Chol 169 mg/dL LDL -- HDL 47 mg/dL Trig 110 mg/dL    Urinalysis Basic - ( 01 Dec 2024 12:00 )    Color: x / Appearance: x / SG: x / pH: x  Gluc: 90 mg/dL / Ketone: x  / Bili: x / Urobili: x   Blood: x / Protein: x / Nitrite: x   Leuk Esterase: x / RBC: x / WBC x   Sq Epi: x / Non Sq Epi: x / Bacteria: x        Culture - Urine (collected 25 Nov 2024 12:56)  Source: Clean Catch Clean Catch (Midstream)  Final Report (29 Nov 2024 15:07):    >100,000 CFU/ml Proteus mirabilis    >100,000 CFU/ml Enterococcus faecalis (vancomycin resistant)  Organism: Proteus mirabilis  Enterococcus faecalis (vancomycin resistant) (29 Nov 2024 15:07)  Organism: Enterococcus faecalis (vancomycin resistant) (29 Nov 2024 15:07)      Method Type: MIGUEL A      -  Ampicillin: S <=2 Predicts results to ampicillin/sulbactam, amoxacillin-clavulanate and  piperacillin-tazobactam.      -  Ciprofloxacin: R >2      -  Daptomycin: S 1      -  Levofloxacin: R >4      -  Linezolid: S <=1      -  Nitrofurantoin: S <=32 Should not be used to treat pyelonephritis.      -  Tetracycline: R >8      -  Vancomycin: R >16  Organism: Proteus mirabilis (29 Nov 2024 15:07)      Method Type: MIGUEL A      -  Amoxicillin/Clavulanic Acid: S <=8/4      -  Ampicillin: S <=8 These ampicillin results predict results for amoxicillin      -  Ampicillin/Sulbactam: S <=4/2      -  Aztreonam: S <=4      -  Cefazolin: S <=2 For uncomplicated UTI with K. pneumoniae, E. coli, or P. mirablis: MIGUEL A <=16 is sensitive and MIGUEL A >=32 is resistant. This also predicts results for oral agents cefaclor, cefdinir, cefpodoxime, cefprozil, cefuroxime axetil, cephalexin and locarbef for uncomplicated UTI. Note that some isolates may be susceptible to these agents while testing resistant to cefazolin.      -  Cefepime: S <=2      -  Cefoxitin: S <=8      -  Ceftriaxone: S <=1      -  Cefuroxime: S <=4      -  Ciprofloxacin: R 2      -  Ertapenem: S <=0.5      -  Gentamicin: S <=2      -  Levofloxacin: S <=0.5      -  Meropenem: S <=1      -  Nitrofurantoin: R 64 Should not be used to treat pyelonephritis      -  Piperacillin/Tazobactam: S <=8      -  Tobramycin: S <=2      -  Trimethoprim/Sulfamethoxazole: S <=0.5/9.5   (01 Dec 2024 14:55)      INFECTIOUS DISEASE HISTORY:  ID consulted for UTI   denies  clinical symptoms or systemic symptoms to suggest UTI    UA without significant pyuria WBC 8     11/25 UA without significant pyuria WBC 22; UCX   >100,000 CFU/ml Proteus mirabilis    >100,000 CFU/ml Enterococcus faecalis (vancomycin resistant)  Currently ordered for:      [facility]  PAST MEDICAL & SURGICAL HISTORY:  BPH (benign prostatic hyperplasia)      Cerebral palsy      Seizure  last seizure >10 years ago      Osteoporosis      Spastic quadriplegia      Urinary calculi      Urinary retention      Asthma      S/P percutaneous endoscopic gastrostomy (PEG) tube placement      H/O cystoscopy      Suprapubic catheter          FAMILY HISTORY  No pertinent family history in first degree relatives    Family history unknown        SOCIAL HISTORY  Social History:        ROS  General: Denies rigors, nightsweats  HEENT: Denies headache, rhinorrhea, sore throat, eye pain  CV: Denies CP, palpitations  PULM: Denies wheezing, hemoptysis  GI: Denies hematemesis, hematochezia, melena  : Denies discharge, hematuria  MSK: Denies arthralgias, myalgias  SKIN: Denies rash, lesions  NEURO: Denies paresthesias, weakness  PSYCH: Denies depression, anxiety     VITALS:  T(F): 98.1, Max: 98.5 (12-01-24 @ 21:34)  HR: 80  BP: 137/89  RR: 18Vital Signs Last 24 Hrs  T(C): 36.7 (02 Dec 2024 05:58), Max: 36.9 (01 Dec 2024 21:34)  T(F): 98.1 (02 Dec 2024 05:58), Max: 98.5 (01 Dec 2024 21:34)  HR: 80 (02 Dec 2024 05:58) (80 - 101)  BP: 137/89 (02 Dec 2024 05:58) (109/65 - 137/89)  BP(mean): --  RR: 18 (02 Dec 2024 05:58) (18 - 18)  SpO2: --        PHYSICAL EXAM:  Gen: NAD, resting in bed  HEENT: Normocephalic, atraumatic  Neck: supple, no lymphadenopathy  CV: Regular rate & regular rhythm  Lungs: decreased BS at bases, no fremitus  Abdomen: Soft, BS present; SPC no suprapubic tenderness to palpation   Ext: Warm, well perfused  Neuro: non focal, awake  Skin: no rash, no erythema  Lines: no phlebitis     TESTS & MEASUREMENTS:                        12.0   5.59  )-----------( 272      ( 02 Dec 2024 06:53 )             35.5     12-01    138  |  101  |  14  ----------------------------<  90  4.3   |  26  |  0.5[L]    Ca    9.3      01 Dec 2024 12:00    TPro  6.7  /  Alb  3.7  /  TBili  <0.2  /  DBili  x   /  AST  18  /  ALT  15  /  AlkPhos  80  12-01      LIVER FUNCTIONS - ( 01 Dec 2024 12:00 )  Alb: 3.7 g/dL / Pro: 6.7 g/dL / ALK PHOS: 80 U/L / ALT: 15 U/L / AST: 18 U/L / GGT: x           Urinalysis Basic - ( 01 Dec 2024 12:00 )    Color: x / Appearance: x / SG: x / pH: x  Gluc: 90 mg/dL / Ketone: x  / Bili: x / Urobili: x   Blood: x / Protein: x / Nitrite: x   Leuk Esterase: x / RBC: x / WBC x   Sq Epi: x / Non Sq Epi: x / Bacteria: x        Urinalysis with Rflx Culture (collected 12-01-24 @ 07:45)    Culture - Urine (collected 11-25-24 @ 12:56)  Source: Clean Catch Clean Catch (Midstream)  Final Report (11-29-24 @ 15:07):    >100,000 CFU/ml Proteus mirabilis    >100,000 CFU/ml Enterococcus faecalis (vancomycin resistant)  Organism: Proteus mirabilis  Enterococcus faecalis (vancomycin resistant) (11-29-24 @ 15:07)  Organism: Enterococcus faecalis (vancomycin resistant) (11-29-24 @ 15:07)      Method Type: MIGUEL A      -  Ampicillin: S <=2 Predicts results to ampicillin/sulbactam, amoxacillin-clavulanate and  piperacillin-tazobactam.      -  Ciprofloxacin: R >2      -  Daptomycin: S 1      -  Levofloxacin: R >4      -  Linezolid: S <=1      -  Nitrofurantoin: S <=32 Should not be used to treat pyelonephritis.      -  Tetracycline: R >8      -  Vancomycin: R >16  Organism: Proteus mirabilis (11-29-24 @ 15:07)      Method Type: MIGUEL A      -  Amoxicillin/Clavulanic Acid: S <=8/4      -  Ampicillin: S <=8 These ampicillin results predict results for amoxicillin      -  Ampicillin/Sulbactam: S <=4/2      -  Aztreonam: S <=4      -  Cefazolin: S <=2 For uncomplicated UTI with K. pneumoniae, E. coli, or P. mirablis: MIGUEL A <=16 is sensitive and MIGUEL A >=32 is resistant. This also predicts results for oral agents cefaclor, cefdinir, cefpodoxime, cefprozil, cefuroxime axetil, cephalexin and locarbef for uncomplicated UTI. Note that some isolates may be susceptible to these agents while testing resistant to cefazolin.      -  Cefepime: S <=2      -  Cefoxitin: S <=8      -  Ceftriaxone: S <=1      -  Cefuroxime: S <=4      -  Ciprofloxacin: R 2      -  Ertapenem: S <=0.5      -  Gentamicin: S <=2      -  Levofloxacin: S <=0.5      -  Meropenem: S <=1      -  Nitrofurantoin: R 64 Should not be used to treat pyelonephritis      -  Piperacillin/Tazobactam: S <=8      -  Tobramycin: S <=2      -  Trimethoprim/Sulfamethoxazole: S <=0.5/9.5    Culture - Blood (collected 11-23-24 @ 20:52)  Source: .Blood BLOOD  Final Report (11-29-24 @ 04:00):    No growth at 5 days    Urinalysis with Rflx Culture (collected 11-23-24 @ 17:34)    Culture - Urine (collected 11-23-24 @ 17:34)  Source: Clean Catch None  Final Report (11-24-24 @ 22:42):    >=3 organisms. Probable collection contamination.    Culture - Urine (collected 09-12-24 @ 19:54)  Source: Clean Catch Clean Catch (Midstream)  Final Report (09-14-24 @ 00:35):    No growth    Culture - Blood (collected 09-12-24 @ 19:48)  Source: .Blood Blood  Final Report (09-18-24 @ 02:01):    No growth at 5 days    Culture - Blood (collected 09-12-24 @ 19:48)  Source: .Blood Blood-Arterial  Final Report (09-18-24 @ 02:01):    No growth at 5 days    Urinalysis with Rflx Culture (collected 09-06-24 @ 23:56)    Culture - Urine (collected 09-06-24 @ 23:56)  Source: Clean Catch None  Final Report (09-10-24 @ 13:27):    >100,000 CFU/ml Pseudomonas aeruginosa    50,000 - 99,000 CFU/mL Pseudomonas aeruginosa #2    Multiple Morphological Strains  Organism: Pseudomonas aeruginosa  Pseudomonas aeruginosa (09-10-24 @ 13:27)  Organism: Pseudomonas aeruginosa (09-10-24 @ 13:27)      Method Type: MIGUEL A      -  Amikacin: I 32      -  Aztreonam: S 8      -  Cefepime: I 16      -  Ceftazidime: S 4      -  Ciprofloxacin: S <=0.25      -  Imipenem: S <=1      -  Levofloxacin: S <=0.5      -  Meropenem: S <=1      -  Piperacillin/Tazobactam: S <=8  Organism: Pseudomonas aeruginosa (09-10-24 @ 13:27)      Method Type: MIGUEL A      -  Amikacin: S <=16      -  Aztreonam: S <=4      -  Cefepime: S <=2      -  Ceftazidime: S 4      -  Ciprofloxacin: S <=0.25      -  Imipenem: S 2      -  Levofloxacin: S <=0.5      -  Meropenem: S <=1      -  Piperacillin/Tazobactam: S <=8            INFECTIOUS DISEASES TESTING      INFLAMMATORY MARKERS      RADIOLOGY & ADDITIONAL TESTS:  I have personally reviewed the radiographic imaging    CARDIOLOGY TESTING  EKG reviewed if performed    MEDICATIONS  ammonium lactate 12% Lotion 1 Topical two times a day  baclofen 10 Oral every 6 hours  calcium carbonate 1250 mG  + Vitamin D (OsCal 500 + D) 1 Oral two times a day  enoxaparin Injectable 40 SubCutaneous every 24 hours  ketoconazole 2% Shampoo 1 Topical <User Schedule>  lactated ringers. 1000 IV Continuous <Continuous>  lactulose Syrup 10 Oral two times a day  magnesium hydroxide Suspension 30 Oral daily  mineral oil/petrolatum Hydrophilic Ointment 1 Topical daily  multivitamin 1 Oral daily  pantoprazole    Tablet 40 Oral before breakfast  petrolatum Ophthalmic Ointment 1 Both EYES two times a day  PHENobarbital 64.8 Oral daily  senna 2 Oral at bedtime        ANTIBIOTICS:      ALLERGIES:  No Known Allergies

## 2024-12-03 NOTE — ED ADULT NURSE NOTE - TEMPLATE LIST FOR HEAD TO TOE ASSESSMENT
Mild elevation of lipase, however the patient denying abdominal pain.  Monitor.  Will repeat lipase tomorrow.   General

## 2024-12-04 DIAGNOSIS — T83.510A INFECTION AND INFLAMMATORY REACTION DUE TO CYSTOSTOMY CATHETER, INITIAL ENCOUNTER: ICD-10-CM

## 2024-12-04 DIAGNOSIS — Z99.3 DEPENDENCE ON WHEELCHAIR: ICD-10-CM

## 2024-12-04 DIAGNOSIS — B95.2 ENTEROCOCCUS AS THE CAUSE OF DISEASES CLASSIFIED ELSEWHERE: ICD-10-CM

## 2024-12-04 DIAGNOSIS — R33.8 OTHER RETENTION OF URINE: ICD-10-CM

## 2024-12-04 DIAGNOSIS — L25.9 UNSPECIFIED CONTACT DERMATITIS, UNSPECIFIED CAUSE: ICD-10-CM

## 2024-12-04 DIAGNOSIS — M54.2 CERVICALGIA: ICD-10-CM
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
## 2024-12-04 DIAGNOSIS — G40.909 EPILEPSY, UNSPECIFIED, NOT INTRACTABLE, WITHOUT STATUS EPILEPTICUS: ICD-10-CM

## 2024-12-04 DIAGNOSIS — A41.9 SEPSIS, UNSPECIFIED ORGANISM: ICD-10-CM

## 2024-12-04 DIAGNOSIS — M81.0 AGE-RELATED OSTEOPOROSIS WITHOUT CURRENT PATHOLOGICAL FRACTURE: ICD-10-CM

## 2024-12-04 DIAGNOSIS — Z79.899 OTHER LONG TERM (CURRENT) DRUG THERAPY: ICD-10-CM

## 2024-12-04 DIAGNOSIS — Z16.29 RESISTANCE TO OTHER SINGLE SPECIFIED ANTIBIOTIC: ICD-10-CM
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
## 2024-12-04 DIAGNOSIS — J45.909 UNSPECIFIED ASTHMA, UNCOMPLICATED: ICD-10-CM

## 2024-12-04 DIAGNOSIS — N40.1 BENIGN PROSTATIC HYPERPLASIA WITH LOWER URINARY TRACT SYMPTOMS: ICD-10-CM

## 2024-12-04 DIAGNOSIS — G80.0 SPASTIC QUADRIPLEGIC CEREBRAL PALSY: ICD-10-CM

## 2024-12-04 DIAGNOSIS — B96.4 PROTEUS (MIRABILIS) (MORGANII) AS THE CAUSE OF DISEASES CLASSIFIED ELSEWHERE: ICD-10-CM

## 2024-12-04 DIAGNOSIS — L21.8 OTHER SEBORRHEIC DERMATITIS: ICD-10-CM

## 2024-12-04 DIAGNOSIS — Z93.1 GASTROSTOMY STATUS: ICD-10-CM

## 2024-12-04 DIAGNOSIS — N39.0 URINARY TRACT INFECTION, SITE NOT SPECIFIED: ICD-10-CM

## 2024-12-06 ENCOUNTER — NON-APPOINTMENT (OUTPATIENT)
Age: 69
End: 2024-12-06

## 2024-12-06 ENCOUNTER — APPOINTMENT (OUTPATIENT)
Dept: UROLOGY | Facility: CLINIC | Age: 69
End: 2024-12-06
Payer: MEDICARE

## 2024-12-06 PROCEDURE — 51705 CHANGE OF BLADDER TUBE: CPT

## 2024-12-09 DIAGNOSIS — K59.00 CONSTIPATION, UNSPECIFIED: ICD-10-CM

## 2024-12-09 DIAGNOSIS — J90 PLEURAL EFFUSION, NOT ELSEWHERE CLASSIFIED: ICD-10-CM

## 2024-12-09 DIAGNOSIS — N30.90 CYSTITIS, UNSPECIFIED WITHOUT HEMATURIA: ICD-10-CM

## 2024-12-09 DIAGNOSIS — G80.9 CEREBRAL PALSY, UNSPECIFIED: ICD-10-CM
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
## 2024-12-09 DIAGNOSIS — Z93.1 GASTROSTOMY STATUS: ICD-10-CM
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
## 2024-12-09 DIAGNOSIS — J98.11 ATELECTASIS: ICD-10-CM

## 2024-12-09 DIAGNOSIS — T83.098A OTHER MECHANICAL COMPLICATION OF OTHER URINARY CATHETER, INITIAL ENCOUNTER: ICD-10-CM

## 2024-12-09 DIAGNOSIS — N40.1 BENIGN PROSTATIC HYPERPLASIA WITH LOWER URINARY TRACT SYMPTOMS: ICD-10-CM

## 2024-12-09 DIAGNOSIS — R34 ANURIA AND OLIGURIA: ICD-10-CM

## 2024-12-09 DIAGNOSIS — Z74.01 BED CONFINEMENT STATUS: ICD-10-CM

## 2024-12-09 DIAGNOSIS — G40.909 EPILEPSY, UNSPECIFIED, NOT INTRACTABLE, WITHOUT STATUS EPILEPTICUS: ICD-10-CM

## 2024-12-17 ENCOUNTER — APPOINTMENT (OUTPATIENT)
Dept: UROLOGY | Facility: CLINIC | Age: 69
End: 2024-12-17

## 2025-01-09 ENCOUNTER — APPOINTMENT (OUTPATIENT)
Dept: UROLOGY | Facility: CLINIC | Age: 70
End: 2025-01-09
Payer: MEDICARE

## 2025-01-09 PROCEDURE — 51705 CHANGE OF BLADDER TUBE: CPT

## 2025-01-27 ENCOUNTER — APPOINTMENT (OUTPATIENT)
Dept: UROLOGY | Facility: CLINIC | Age: 70
End: 2025-01-27
Payer: MEDICARE

## 2025-01-27 VITALS
RESPIRATION RATE: 16 BRPM | DIASTOLIC BLOOD PRESSURE: 71 MMHG | SYSTOLIC BLOOD PRESSURE: 112 MMHG | OXYGEN SATURATION: 96 % | WEIGHT: 120 LBS | HEART RATE: 65 BPM | HEIGHT: 63 IN | BODY MASS INDEX: 21.26 KG/M2 | TEMPERATURE: 98 F

## 2025-01-27 DIAGNOSIS — R82.71 BACTERIURIA: ICD-10-CM

## 2025-01-27 DIAGNOSIS — R82.991 HYPOCITRATURIA: ICD-10-CM

## 2025-01-27 DIAGNOSIS — Z93.59 OTHER CYSTOSTOMY STATUS: ICD-10-CM

## 2025-01-27 DIAGNOSIS — N31.9 NEUROMUSCULAR DYSFUNCTION OF BLADDER, UNSPECIFIED: ICD-10-CM

## 2025-01-27 DIAGNOSIS — R82.992 HYPEROXALURIA: ICD-10-CM

## 2025-01-27 DIAGNOSIS — Z87.442 PERSONAL HISTORY OF URINARY CALCULI: ICD-10-CM

## 2025-01-27 DIAGNOSIS — R82.90 UNSPECIFIED ABNORMAL FINDINGS IN URINE: ICD-10-CM

## 2025-01-27 PROCEDURE — 99215 OFFICE O/P EST HI 40 MIN: CPT | Mod: 25

## 2025-01-27 PROCEDURE — 99401 PREV MED CNSL INDIV APPRX 15: CPT

## 2025-02-05 ENCOUNTER — EMERGENCY (EMERGENCY)
Facility: HOSPITAL | Age: 70
LOS: 0 days | Discharge: HOME | End: 2025-02-06
Attending: EMERGENCY MEDICINE
Payer: MEDICARE

## 2025-02-05 VITALS
RESPIRATION RATE: 18 BRPM | OXYGEN SATURATION: 95 % | SYSTOLIC BLOOD PRESSURE: 159 MMHG | WEIGHT: 149.91 LBS | HEART RATE: 104 BPM | DIASTOLIC BLOOD PRESSURE: 92 MMHG | TEMPERATURE: 98 F | HEIGHT: 60 IN

## 2025-02-05 DIAGNOSIS — Z93.59 OTHER CYSTOSTOMY STATUS: Chronic | ICD-10-CM

## 2025-02-05 DIAGNOSIS — Z93.1 GASTROSTOMY STATUS: Chronic | ICD-10-CM

## 2025-02-05 DIAGNOSIS — R33.8 OTHER RETENTION OF URINE: ICD-10-CM

## 2025-02-05 DIAGNOSIS — M81.0 AGE-RELATED OSTEOPOROSIS WITHOUT CURRENT PATHOLOGICAL FRACTURE: ICD-10-CM

## 2025-02-05 DIAGNOSIS — J45.909 UNSPECIFIED ASTHMA, UNCOMPLICATED: ICD-10-CM

## 2025-02-05 DIAGNOSIS — G40.909 EPILEPSY, UNSPECIFIED, NOT INTRACTABLE, WITHOUT STATUS EPILEPTICUS: ICD-10-CM

## 2025-02-05 DIAGNOSIS — G80.9 CEREBRAL PALSY, UNSPECIFIED: ICD-10-CM

## 2025-02-05 DIAGNOSIS — N40.1 BENIGN PROSTATIC HYPERPLASIA WITH LOWER URINARY TRACT SYMPTOMS: ICD-10-CM

## 2025-02-05 DIAGNOSIS — R10.30 LOWER ABDOMINAL PAIN, UNSPECIFIED: ICD-10-CM

## 2025-02-05 DIAGNOSIS — Z87.442 PERSONAL HISTORY OF URINARY CALCULI: ICD-10-CM

## 2025-02-05 DIAGNOSIS — N30.90 CYSTITIS, UNSPECIFIED WITHOUT HEMATURIA: ICD-10-CM

## 2025-02-05 LAB
ALBUMIN SERPL ELPH-MCNC: 3.9 G/DL — SIGNIFICANT CHANGE UP (ref 3.5–5.2)
ALP SERPL-CCNC: 103 U/L — SIGNIFICANT CHANGE UP (ref 30–115)
ALT FLD-CCNC: 11 U/L — SIGNIFICANT CHANGE UP (ref 0–41)
ANION GAP SERPL CALC-SCNC: 16 MMOL/L — HIGH (ref 7–14)
APPEARANCE UR: ABNORMAL
AST SERPL-CCNC: 31 U/L — SIGNIFICANT CHANGE UP (ref 0–41)
BACTERIA # UR AUTO: ABNORMAL /HPF
BASOPHILS # BLD AUTO: 0.03 K/UL — SIGNIFICANT CHANGE UP (ref 0–0.2)
BASOPHILS NFR BLD AUTO: 0.4 % — SIGNIFICANT CHANGE UP (ref 0–1)
BILIRUB SERPL-MCNC: <0.2 MG/DL — SIGNIFICANT CHANGE UP (ref 0.2–1.2)
BILIRUB UR-MCNC: NEGATIVE — SIGNIFICANT CHANGE UP
BUN SERPL-MCNC: 6 MG/DL — LOW (ref 10–20)
CALCIUM SERPL-MCNC: 9.2 MG/DL — SIGNIFICANT CHANGE UP (ref 8.4–10.5)
CAST: 0 /LPF — SIGNIFICANT CHANGE UP (ref 0–4)
CHLORIDE SERPL-SCNC: 96 MMOL/L — LOW (ref 98–110)
CO2 SERPL-SCNC: 22 MMOL/L — SIGNIFICANT CHANGE UP (ref 17–32)
COLOR SPEC: YELLOW — SIGNIFICANT CHANGE UP
CREAT SERPL-MCNC: 0.5 MG/DL — LOW (ref 0.7–1.5)
DIFF PNL FLD: ABNORMAL
EGFR: 110 ML/MIN/1.73M2 — SIGNIFICANT CHANGE UP
EOSINOPHIL # BLD AUTO: 0.2 K/UL — SIGNIFICANT CHANGE UP (ref 0–0.7)
EOSINOPHIL NFR BLD AUTO: 2.5 % — SIGNIFICANT CHANGE UP (ref 0–8)
GLUCOSE SERPL-MCNC: 107 MG/DL — HIGH (ref 70–99)
GLUCOSE UR QL: NEGATIVE MG/DL — SIGNIFICANT CHANGE UP
HCT VFR BLD CALC: 35.3 % — LOW (ref 42–52)
HGB BLD-MCNC: 12 G/DL — LOW (ref 14–18)
IMM GRANULOCYTES NFR BLD AUTO: 0.4 % — HIGH (ref 0.1–0.3)
KETONES UR-MCNC: NEGATIVE MG/DL — SIGNIFICANT CHANGE UP
LEUKOCYTE ESTERASE UR-ACNC: ABNORMAL
LYMPHOCYTES # BLD AUTO: 1.48 K/UL — SIGNIFICANT CHANGE UP (ref 1.2–3.4)
LYMPHOCYTES # BLD AUTO: 18.7 % — LOW (ref 20.5–51.1)
MCHC RBC-ENTMCNC: 29.6 PG — SIGNIFICANT CHANGE UP (ref 27–31)
MCHC RBC-ENTMCNC: 34 G/DL — SIGNIFICANT CHANGE UP (ref 32–37)
MCV RBC AUTO: 86.9 FL — SIGNIFICANT CHANGE UP (ref 80–94)
MONOCYTES # BLD AUTO: 0.62 K/UL — HIGH (ref 0.1–0.6)
MONOCYTES NFR BLD AUTO: 7.8 % — SIGNIFICANT CHANGE UP (ref 1.7–9.3)
NEUTROPHILS # BLD AUTO: 5.55 K/UL — SIGNIFICANT CHANGE UP (ref 1.4–6.5)
NEUTROPHILS NFR BLD AUTO: 70.2 % — SIGNIFICANT CHANGE UP (ref 42.2–75.2)
NITRITE UR-MCNC: NEGATIVE — SIGNIFICANT CHANGE UP
NRBC # BLD: 0 /100 WBCS — SIGNIFICANT CHANGE UP (ref 0–0)
NRBC BLD-RTO: 0 /100 WBCS — SIGNIFICANT CHANGE UP (ref 0–0)
PH UR: 7.5 — SIGNIFICANT CHANGE UP (ref 5–8)
PLATELET # BLD AUTO: 317 K/UL — SIGNIFICANT CHANGE UP (ref 130–400)
PMV BLD: 10.1 FL — SIGNIFICANT CHANGE UP (ref 7.4–10.4)
POTASSIUM SERPL-MCNC: 5.3 MMOL/L — HIGH (ref 3.5–5)
POTASSIUM SERPL-SCNC: 5.3 MMOL/L — HIGH (ref 3.5–5)
PROT SERPL-MCNC: 7 G/DL — SIGNIFICANT CHANGE UP (ref 6–8)
PROT UR-MCNC: 100 MG/DL
RBC # BLD: 4.06 M/UL — LOW (ref 4.7–6.1)
RBC # FLD: 13.2 % — SIGNIFICANT CHANGE UP (ref 11.5–14.5)
RBC CASTS # UR COMP ASSIST: 60 /HPF — HIGH (ref 0–4)
SODIUM SERPL-SCNC: 134 MMOL/L — LOW (ref 135–146)
SP GR SPEC: >1.03 — HIGH (ref 1–1.03)
SQUAMOUS # UR AUTO: 1 /HPF — SIGNIFICANT CHANGE UP (ref 0–5)
UROBILINOGEN FLD QL: 0.2 MG/DL — SIGNIFICANT CHANGE UP (ref 0.2–1)
WBC # BLD: 7.91 K/UL — SIGNIFICANT CHANGE UP (ref 4.8–10.8)
WBC # FLD AUTO: 7.91 K/UL — SIGNIFICANT CHANGE UP (ref 4.8–10.8)
WBC UR QL: 81 /HPF — HIGH (ref 0–5)

## 2025-02-05 PROCEDURE — 99284 EMERGENCY DEPT VISIT MOD MDM: CPT | Mod: 25

## 2025-02-05 PROCEDURE — 81001 URINALYSIS AUTO W/SCOPE: CPT

## 2025-02-05 PROCEDURE — 80053 COMPREHEN METABOLIC PANEL: CPT

## 2025-02-05 PROCEDURE — 87077 CULTURE AEROBIC IDENTIFY: CPT

## 2025-02-05 PROCEDURE — 74177 CT ABD & PELVIS W/CONTRAST: CPT | Mod: 26

## 2025-02-05 PROCEDURE — 87086 URINE CULTURE/COLONY COUNT: CPT

## 2025-02-05 PROCEDURE — 36415 COLL VENOUS BLD VENIPUNCTURE: CPT

## 2025-02-05 PROCEDURE — 85025 COMPLETE CBC W/AUTO DIFF WBC: CPT

## 2025-02-05 PROCEDURE — 99285 EMERGENCY DEPT VISIT HI MDM: CPT | Mod: FS

## 2025-02-05 PROCEDURE — 87186 SC STD MICRODIL/AGAR DIL: CPT

## 2025-02-05 PROCEDURE — 74177 CT ABD & PELVIS W/CONTRAST: CPT

## 2025-02-05 PROCEDURE — 96374 THER/PROPH/DIAG INJ IV PUSH: CPT | Mod: XU

## 2025-02-05 RX ORDER — KETOROLAC TROMETHAMINE 10 MG
15 TABLET ORAL ONCE
Refills: 0 | Status: DISCONTINUED | OUTPATIENT
Start: 2025-02-05 | End: 2025-02-05

## 2025-02-05 RX ADMIN — Medication 15 MILLIGRAM(S): at 20:30

## 2025-02-05 NOTE — ED PROVIDER NOTE - PHYSICAL EXAMINATION
VITAL SIGNS: I have reviewed nursing notes and confirm.  CONSTITUTIONAL: Patient is in no acute distress.  SKIN: Skin exam is warm and dry, no acute rash.  HEAD: Normocephalic; atraumatic.  EYES: PERRL, EOM intact; conjunctiva and sclera clear.  ENT: No nasal discharge; airway clear.   NECK: Supple; non tender.  CARD: S1, S2 normal; no murmurs, gallops, or rubs. Regular rate and rhythm.  RESP: Clear to auscultation bilaterally. No wheezes, rales or rhonchi.  ABD: Normal bowel sounds; soft; non-distended; +SPT with minimal yellow discharge around site with tenderness around spt.   EXT: Normal ROM. No edema.  LYMPH: No acute cervical adenopathy.  NEURO: Alert, oriented. Grossly unremarkable. No focal deficits.  PSYCH: Cooperative, appropriate.

## 2025-02-05 NOTE — CONSULT NOTE ADULT - SUBJECTIVE AND OBJECTIVE BOX
HPI:  65 yo M Past PMHx of Cerebral Palsy, Seizure disorder, spastic paraplegia, BPH, Hx of Nephrolithiasis s/p pcnl x 2 with known incompetent bladder neck s/p SPT presented from group home with lower abdominal pain and decreased urine output from SPT Patient seen and examined at bedside w/ aide. Per aide, patient complaining of pain around SPT site and bladder spasms.    SPT draining cloudy urine with debris. Catheter due to be exchanged tomorrow. SPT changed at bedside pt tolerated it well without any complications. Irrigated and drains well. Pt however still complaining of pain at SPT site.    PAST MEDICAL & SURGICAL HISTORY:  BPH (benign prostatic hyperplasia)  Cerebral palsy  Seizure  last seizure >10 years ago  Osteoporosis  Spastic quadriplegia  Urinary calculi  Urinary retention  Asthma  S/P percutaneous endoscopic gastrostomy (PEG) tube placement  H/O cystoscopy  Suprapubic catheter    Allergies  No Known Allergies    REVIEW OF SYSTEMS   [x] A ten-point review of systems was otherwise negative except as noted.  [x] Due to altered mental status/intubation, subjective information were not able to be obtained from patient. History was obtained, to the extent possible, from review of the chart and collateral sources of information.    Vital Signs Last 24 Hrs  T(C): 36.9 (05 Feb 2025 16:46), Max: 36.9 (05 Feb 2025 16:46)  T(F): 98.5 (05 Feb 2025 16:46), Max: 98.5 (05 Feb 2025 16:46)  HR: 104 (05 Feb 2025 16:46) (104 - 104)  BP: 159/92 (05 Feb 2025 16:46) (159/92 - 159/92)  RR: 18 (05 Feb 2025 16:46) (18 - 18)  SpO2: 95% (05 Feb 2025 16:46) (95% - 95%)    Parameters below as of 05 Feb 2025 16:46  Patient On (Oxygen Delivery Method): room air    PHYSICAL EXAM:  GEN: NAD, awake and alert.  SKIN: Good color, non diaphoretic.  HEENT: NC/AT.  RESP: No dyspnea, non-labored breathing. No use of accessory muscles.  CARDIO: +S1/S2  /ABDO: Soft, NT/ND, no palpable bladder, + suprapubic tenderness/ + Suprapubic Tube draining  yellow urine. SPT site with mild erythema and small amount of purulent drainage   BACK: No CVAT B/L  MSK: contract body habitus   I&O's Summary

## 2025-02-05 NOTE — ED PROVIDER NOTE - CARE PROVIDER_API CALL
Theodore Guardado  Urology  [address] [place] [id number]  Phone: [phone]  Fax: [phone]  Follow Up Time:

## 2025-02-05 NOTE — ED PROVIDER NOTE - CLINICAL SUMMARY MEDICAL DECISION MAKING FREE TEXT BOX
Pt here with pain at SPC site and bladder spasm in setting of SPC replacement pending tomorrow and recent foul smelling/dark urine in alatorre. Urology consulted who requested repeat CT abd/pelv and will exchange SPC in ED. Plan for labs, imaging r/o UTI, SPC obstruction, malignancy. CT shows cystitis. Prior urine cx grew proteus sensitive to bactrim and VRE sensitive to ampicillin so may be d/c home with augmentin and bactrim. Has uro appt tomorrow.

## 2025-02-05 NOTE — ED PROVIDER NOTE - PATIENT PORTAL LINK FT
You can access the FollowMyHealth Patient Portal offered by [facility] by registering at the following website: [email or web address]. By joining [employer]’s FollowMyHealth portal, you will also be able to view your health information using other applications (apps) compatible with our system.

## 2025-02-05 NOTE — ED PROVIDER NOTE - OBJECTIVE STATEMENT
68 yo M with pmhx of seizures, spastic paraplegia, BPH, kidney stones s/p pcnl x 2 with known incompetent bladder neck s/p SPT presenting from group home with lower abdominal pain every since SPT drains urine. Patient is due for catheter due to be exchanged tomorrow at urologist office. No fevers or chills.

## 2025-02-05 NOTE — ED PROVIDER NOTE - PROGRESS NOTE DETAILS
TC: Prior urine cx grew proteus sensitive to bactrim and VRE sensitive to ampicillin so may be d/c home with augmentin and bactrim TC: CMP grossly wnl. CT shows cystitis. Pending CBC and ua, then uro reccs. Signed out to Dr. Perez to f/u rest of workup and reassess. s/o received from ABHILASH Kirkland - pending labs, will reassess and dispo pt feeling better, labs reviewed, d/c home f/u with private doc, bactrim augmentin

## 2025-02-05 NOTE — ED PROVIDER NOTE - DIFFERENTIAL DIAGNOSIS
differential dx includes but is not limited to:  UTI, SPC obstruction, malignancy Differential Diagnosis

## 2025-02-05 NOTE — ED PROVIDER NOTE - ATTENDING APP SHARED VISIT CONTRIBUTION OF CARE
68 yo M with PMHx of asthma, BPH, cerebral palsy, osteoporosis, seizure, spastic quadriplegia, urinary retention/calculi s/p suprapubic cath who presents from group home for pain at SPC site x1 day. Per aide, for past 2-3 days she noticed that alatorre was draining brown colored foul smelling urine. Today pt groaning every time he urinates. No fever, vomiting. Has uro appt tomorrow for SPC exchange. Per chart review, pt was admitted 12/1-12/2 for oliguria. Had CT abd/pelv showing nodular thickening of bladder dome in region of SPC. SPC was exchanged and pt was trialed off of zosyn as per ID reccs that pt is chronic colonizer.   dside w/ aide. Per aide, patient complaining of pain around SPT site and bladder spasms. Has prior urine cx which grew VRE and proteus mirabilis.    Uro: Dr. Guardado    CONSTITUTIONAL: chronically ill appearing, contracted, nontoxic appearing, in no acute distress  SKIN: warm, dry, no rash, cap refill < 2 seconds  HEENT: normocephalic, atraumatic, no conjunctival erythema, moist mucous membranes, patent airway  NECK: supple  CV:  regular rate, regular rhythm, 2+ radial pulses bilaterally  RESP: no wheezes, no rales, no rhonchi, normal work of breathing  ABD: soft, no tenderness, nondistended, no rebound, no guarding, SPC in place with mild surrounding erythema  MSK: no cyanosis, no edema  NEURO: awake, alert, contracted  PSYCH: cooperative, appropriate    A&P:  Pt here with pain at SPC site and bladder spasm in setting of SPC replacement pending tomorrow and recent foul smelling/dark urine in alatorre. Urology consulted who requested repeat CT abd/pelv and will exchange SPC in ED. Plan for labs, imaging r/o UTI, SPC obstruction, malignancy.

## 2025-02-05 NOTE — CONSULT NOTE ADULT - ASSESSMENT
70 y/o male with chronic SPT presenting with tube requiring exchange and lower abdominal pain  - f/u UA, UCx  - f/u cbc, bmp  - Abx if signs of infection  - Pain control  - f/u as o/p with Dr. Guardado  - Catheter exchanged today and irrigated at beside. Draining well

## 2025-02-06 ENCOUNTER — APPOINTMENT (OUTPATIENT)
Dept: UROLOGY | Facility: CLINIC | Age: 70
End: 2025-02-06
Payer: MEDICARE

## 2025-02-06 VITALS
DIASTOLIC BLOOD PRESSURE: 83 MMHG | SYSTOLIC BLOOD PRESSURE: 131 MMHG | RESPIRATION RATE: 18 BRPM | OXYGEN SATURATION: 93 % | TEMPERATURE: 99 F | HEART RATE: 90 BPM

## 2025-02-06 PROCEDURE — 51705 CHANGE OF BLADDER TUBE: CPT

## 2025-02-06 RX ORDER — AMOXICILLIN AND CLAVULANATE POTASSIUM 200; 28.5 MG/5ML; MG/5ML
1 POWDER, FOR SUSPENSION ORAL
Qty: 14 | Refills: 0
Start: 2025-02-06 | End: 2025-02-12

## 2025-02-06 RX ORDER — SULFAMETHOXAZOLE AND TRIMETHOPRIM 400; 80 MG/1; MG/1
1 TABLET ORAL
Qty: 14 | Refills: 0
Start: 2025-02-06 | End: 2025-02-12

## 2025-02-06 NOTE — ED ADULT NURSE NOTE - NSFALLRISKINTERV_ED_ALL_ED
Communicate fall risk and risk factors to all staff, patient, and family/Provide patient with walking aids/Provide visual cue: yellow wristband, yellow gown, etc/Reinforce activity limits and safety measures with patient and family/Call bell, personal items and telephone in reach/Instruct patient to call for assistance before getting out of bed/chair/stretcher/Non-slip footwear applied when patient is off stretcher/[place] to call system/Physically safe environment - no spills, clutter or unnecessary equipment/Purposeful Proactive Rounding/Room/bathroom lighting operational, light cord in reach

## 2025-02-08 LAB
-  AMOXICILLIN/CLAVULANIC ACID: SIGNIFICANT CHANGE UP
-  AMOXICILLIN/CLAVULANIC ACID: SIGNIFICANT CHANGE UP
-  AMPICILLIN/SULBACTAM: SIGNIFICANT CHANGE UP
-  AMPICILLIN/SULBACTAM: SIGNIFICANT CHANGE UP
-  AMPICILLIN: SIGNIFICANT CHANGE UP
-  AZTREONAM: SIGNIFICANT CHANGE UP
-  AZTREONAM: SIGNIFICANT CHANGE UP
-  CEFAZOLIN: SIGNIFICANT CHANGE UP
-  CEFAZOLIN: SIGNIFICANT CHANGE UP
-  CEFEPIME: SIGNIFICANT CHANGE UP
-  CEFEPIME: SIGNIFICANT CHANGE UP
-  CEFOXITIN: SIGNIFICANT CHANGE UP
-  CEFOXITIN: SIGNIFICANT CHANGE UP
-  CEFTRIAXONE: SIGNIFICANT CHANGE UP
-  CEFTRIAXONE: SIGNIFICANT CHANGE UP
-  CEFUROXIME: SIGNIFICANT CHANGE UP
-  CEFUROXIME: SIGNIFICANT CHANGE UP
-  CIPROFLOXACIN: SIGNIFICANT CHANGE UP
-  DAPTOMYCIN: SIGNIFICANT CHANGE UP
-  ERTAPENEM: SIGNIFICANT CHANGE UP
-  ERTAPENEM: SIGNIFICANT CHANGE UP
-  GENTAMICIN: SIGNIFICANT CHANGE UP
-  IMIPENEM: SIGNIFICANT CHANGE UP
-  LEVOFLOXACIN: SIGNIFICANT CHANGE UP
-  LINEZOLID: SIGNIFICANT CHANGE UP
-  MEROPENEM: SIGNIFICANT CHANGE UP
-  MEROPENEM: SIGNIFICANT CHANGE UP
-  NITROFURANTOIN: SIGNIFICANT CHANGE UP
-  OXACILLIN: SIGNIFICANT CHANGE UP
-  PENICILLIN: SIGNIFICANT CHANGE UP
-  PIPERACILLIN/TAZOBACTAM: SIGNIFICANT CHANGE UP
-  PIPERACILLIN/TAZOBACTAM: SIGNIFICANT CHANGE UP
-  RIFAMPIN: SIGNIFICANT CHANGE UP
-  TETRACYCLINE: SIGNIFICANT CHANGE UP
-  TETRACYCLINE: SIGNIFICANT CHANGE UP
-  TOBRAMYCIN: SIGNIFICANT CHANGE UP
-  TOBRAMYCIN: SIGNIFICANT CHANGE UP
-  TRIMETHOPRIM/SULFAMETHOXAZOLE: SIGNIFICANT CHANGE UP
-  VANCOMYCIN: SIGNIFICANT CHANGE UP
-  VANCOMYCIN: SIGNIFICANT CHANGE UP
CULTURE RESULTS: ABNORMAL
METHOD TYPE: SIGNIFICANT CHANGE UP
ORGANISM # SPEC MICROSCOPIC CNT: ABNORMAL
ORGANISM # SPEC MICROSCOPIC CNT: SIGNIFICANT CHANGE UP
SPECIMEN SOURCE: SIGNIFICANT CHANGE UP

## 2025-02-11 NOTE — REVIEW OF SYSTEMS
Anesthesia Evaluation     Patient summary reviewed and Nursing notes reviewed   NPO Solid Status: > 6 hours  NPO Liquid Status: > 2 hours           Airway   Mallampati: III  TM distance: >3 FB  Neck ROM: full  Small opening  Dental - normal exam     Pulmonary    (-) COPD, asthma, not a smoker  Cardiovascular     ECG reviewed    (+) hypertension, hyperlipidemia  (-) past MI, dysrhythmias, angina      Neuro/Psych  (+) dementia  (-) seizures, CVA  GI/Hepatic/Renal/Endo    (+) renal disease (recent NEHEMIAS/cysto/stent, stent removed 2/07)-, diabetes mellitus (on metformin) type 2  (-) GERD, liver disease, no thyroid disorder    Musculoskeletal     Abdominal    Substance History      OB/GYN          Other                          Anesthesia Plan    ASA 3     general       Anesthetic plan, risks, benefits, and alternatives have been provided, discussed and informed consent has been obtained with: patient.        CODE STATUS:    Code Status (Patient has no pulse and is not breathing): CPR (Attempt to Resuscitate)  Medical Interventions (Patient has pulse or is breathing): Full Support       [see HPI] : see HPI [Negative] : Psychiatric

## 2025-03-01 NOTE — ED PROVIDER NOTE - DISCHARGE DATE
Initial Prenatal Visit      CC:   Chief Complaint   Patient presents with    Initial Prenatal Visit     Transferring from [address] [place] [address]          HPI: Alex Julio is a 27 y.o. at 34w6d who presents for initial prenatal visit as transfer of care from [facility] [place]     Patient is doing well today without complaints.     +FM.  Denies pelvic pressure, cramping or contractions.  Denies vaginal bleeding, loss of fluid.  Denies headache, vision changes, RUQ pain, increased LE edema.  Denies chest pain, shortness of breath, fever, chills, nausea, vomiting.      Pregnancy has been complicated by anxiety, doing well with Sertraline, needs refill.   Prior pregnancy complicated by Forceps delivery with second degree laceration.      Medical history significant for anxiety.  Surgical history significant for wisdom teeth.     Review of Systems: The following ROS was otherwise negative, except as noted in the HPI: constitutional, HEENT, respiratory, cardiovascular, gastrointestinal, genitourinary, skin, musculoskeletal, neurological, psych    Gynecologic History: Denies hx of abnl pap smears. No hx of STIs.   Last pap smear 2024    Obstetrical History:  OB History          2    Para   1    Term   1            AB        Living   1         SAB        IAB        Ectopic        Molar        Multiple   0    Live Births   1                Past Medical History:   Past Medical History:   Diagnosis Date    Mental disorder    Anxiety    Medications:  Prior to Admission medications    Medication Sig Start Date End Date Taking? Authorizing Provider   sertraline (ZOLOFT) 25 MG tablet Take 1 tablet by mouth daily 25  Yes Ronit Elam, DO   Prenatal MV-Min-Fe Fum-FA-DHA (PRENATAL 1 PO) Take by mouth   Yes Provider, MD Jimena   cetirizine (ZYRTEC ALLERGY) 10 MG tablet     Provider, MD Jimena        Allergies:  Patient has no known allergies.    Surgical History:  Past Surgical History:   Procedure 
14-Sep-2018

## 2025-03-06 ENCOUNTER — APPOINTMENT (OUTPATIENT)
Dept: UROLOGY | Facility: CLINIC | Age: 70
End: 2025-03-06
Payer: MEDICARE

## 2025-03-06 PROCEDURE — 51702 INSERT TEMP BLADDER CATH: CPT

## 2025-03-06 RX ORDER — SULFAMETHOXAZOLE AND TRIMETHOPRIM 800; 160 MG/1; MG/1
800-160 TABLET ORAL TWICE DAILY
Qty: 14 | Refills: 0 | Status: ACTIVE | COMMUNITY
Start: 2025-03-06 | End: 1900-01-01

## 2025-03-06 RX ORDER — AMOXICILLIN AND CLAVULANATE POTASSIUM 875; 125 MG/1; MG/1
875-125 TABLET, COATED ORAL
Qty: 14 | Refills: 0 | Status: ACTIVE | COMMUNITY
Start: 2025-03-06 | End: 1900-01-01

## 2025-03-13 ENCOUNTER — APPOINTMENT (OUTPATIENT)
Dept: UROLOGY | Facility: CLINIC | Age: 70
End: 2025-03-13
Payer: MEDICARE

## 2025-03-13 DIAGNOSIS — Z93.59 OTHER CYSTOSTOMY STATUS: ICD-10-CM

## 2025-03-13 DIAGNOSIS — N13.8 BENIGN PROSTATIC HYPERPLASIA WITH LOWER URINARY TRACT SYMPMS: ICD-10-CM

## 2025-03-13 DIAGNOSIS — N35.912 UNSPECIFIED BULBOUS URETHRAL STRICTURE, MALE: ICD-10-CM

## 2025-03-13 DIAGNOSIS — N40.1 BENIGN PROSTATIC HYPERPLASIA WITH LOWER URINARY TRACT SYMPMS: ICD-10-CM

## 2025-03-13 DIAGNOSIS — G80.9 CEREBRAL PALSY, UNSPECIFIED: ICD-10-CM

## 2025-03-13 DIAGNOSIS — R32 UNSPECIFIED URINARY INCONTINENCE: ICD-10-CM

## 2025-03-13 PROCEDURE — 51784 ANAL/URINARY MUSCLE STUDY: CPT

## 2025-03-13 PROCEDURE — 51726 COMPLEX CYSTOMETROGRAM: CPT

## 2025-03-13 PROCEDURE — 52000 CYSTOURETHROSCOPY: CPT

## 2025-04-02 ENCOUNTER — APPOINTMENT (OUTPATIENT)
Dept: UROLOGY | Facility: CLINIC | Age: 70
End: 2025-04-02
Payer: MEDICARE

## 2025-04-02 VITALS
HEART RATE: 98 BPM | BODY MASS INDEX: 28.35 KG/M2 | OXYGEN SATURATION: 91 % | SYSTOLIC BLOOD PRESSURE: 133 MMHG | RESPIRATION RATE: 14 BRPM | TEMPERATURE: 98 F | WEIGHT: 160 LBS | DIASTOLIC BLOOD PRESSURE: 74 MMHG | HEIGHT: 63 IN

## 2025-04-02 DIAGNOSIS — Z00.00 ENCOUNTER FOR GENERAL ADULT MEDICAL EXAMINATION W/OUT ABNORMAL FINDINGS: ICD-10-CM

## 2025-04-02 DIAGNOSIS — K59.09 OTHER CONSTIPATION: ICD-10-CM

## 2025-04-02 DIAGNOSIS — N20.0 CALCULUS OF KIDNEY: ICD-10-CM

## 2025-04-02 DIAGNOSIS — Z00.01 ENCOUNTER FOR GENERAL ADULT MEDICAL EXAMINATION WITH ABNORMAL FINDINGS: ICD-10-CM

## 2025-04-02 PROCEDURE — 99215 OFFICE O/P EST HI 40 MIN: CPT | Mod: 25

## 2025-04-02 PROCEDURE — 99401 PREV MED CNSL INDIV APPRX 15: CPT

## 2025-04-11 NOTE — PATIENT PROFILE ADULT - FALL HARM RISK - HARM RISK INTERVENTIONS
Chemical Peel: 15% TCA Post-Care Instructions: I reviewed with the patient in detail post-care instructions. Patient should avoid sun exposure and wear sun protection. HYDROCORTISONE WAS APPLIED TO PATIENT AFTER DUE TO ITCHING IN HAIRLINE Price (Use Numbers Only, No Special Characters Or $): 0 Erythema: mild Post Peel Care: After the procedure, the treatment area was washed with water, sun protection and post-care instructions were reviewed with the patient. Done by ARCHANA Detail Level: Detailed Number Of Coats: 2 Consent: Prior to the procedure, written consent was obtained and risks were reviewed, including but not limited to: redness, peeling, blistering, pigmentary change, scarring, infection, and pain. Prep: The treated area was degreased with a alcohol swab. Frost (0,1+,2+,3+,4+): 1+ Assistance with ambulation/Assistance OOB with selected safe patient handling equipment/Communicate Risk of Fall with Harm to all staff/Discuss with provider need for PT consult/Monitor gait and stability/Reinforce activity limits and safety measures with patient and family/Tailored Fall Risk Interventions/Visual Cue: Yellow wristband and red socks/Bed in lowest position, wheels locked, appropriate side rails in place/Call bell, personal items and telephone in reach/Instruct patient to call for assistance before getting out of bed or chair/Non-slip footwear when patient is out of bed/[place] to call system/Physically safe environment - no spills, clutter or unnecessary equipment/Purposeful Proactive Rounding/Room/bathroom lighting operational, light cord in reach

## 2025-04-15 ENCOUNTER — APPOINTMENT (OUTPATIENT)
Dept: UROLOGY | Facility: CLINIC | Age: 70
End: 2025-04-15

## 2025-04-15 PROCEDURE — 52000 CYSTOURETHROSCOPY: CPT

## 2025-04-16 ENCOUNTER — APPOINTMENT (OUTPATIENT)
Dept: UROLOGY | Facility: CLINIC | Age: 70
End: 2025-04-16

## 2025-04-29 ENCOUNTER — APPOINTMENT (OUTPATIENT)
Dept: OTOLARYNGOLOGY | Facility: CLINIC | Age: 70
End: 2025-04-29

## 2025-04-29 VITALS — BODY MASS INDEX: 28.35 KG/M2 | HEIGHT: 63 IN | WEIGHT: 160 LBS

## 2025-04-29 DIAGNOSIS — H93.8X3 OTHER SPECIFIED DISORDERS OF EAR, BILATERAL: ICD-10-CM

## 2025-04-29 DIAGNOSIS — J39.2 OTHER DISEASES OF PHARYNX: ICD-10-CM

## 2025-04-29 DIAGNOSIS — H61.23 IMPACTED CERUMEN, BILATERAL: ICD-10-CM

## 2025-04-29 PROCEDURE — 31575 DIAGNOSTIC LARYNGOSCOPY: CPT

## 2025-04-29 PROCEDURE — 99214 OFFICE O/P EST MOD 30 MIN: CPT | Mod: 25

## 2025-04-29 PROCEDURE — 69210 REMOVE IMPACTED EAR WAX UNI: CPT

## 2025-05-06 NOTE — ED PROVIDER NOTE - PROGRESS NOTE4
5/6/2025      Robert B Behr  [address]  [place] [address]      Dear Colleague,    Thank you for referring your patient, Robert B Behr, to the [facility]. Please see a copy of my visit note below.      [facility]  [address] [address]  Phone: [phone]  Fax: [phone]    Patient:  Robert B Behr, Date of birth 1949  Date of Visit:  05/06/2025  Referring Provider Svitlana Taylor      Assessment & Plan     Robert B Behr is a 76 year old male with a history of COPD, lung nodules, and tobacco use who presents for follow-up.    # COPD:  Currently on inhaled corticosteroid / long-acting beta agonist he just never really used long-acting muscarinic agent despite having the medication. Reports some worsened RODRIGUEZ and cough over the past year. Discussed not a candidate for lung transplant.  - Maintenance therapy: Wixela   - Rescue: Combivent (reports use), Albuterol  - Ensifentrine neb BID ordered for improved control    # Tobacco use: continues to roll his own cigarettes and not interested in quitting. He smokes 5-10 cig/day, less than historical use    # lung mass - persistent for several years, there is some FDG uptake, he had two prior JAKY on sputum culture, minimally symptomatic, at high risk for cancer, the bronchoscopy biopsy 2024 showed atypical cells. Today we discussed the uncertainty of this lesion, potential risk of repeat biopsy (percutaneous biopsy and risk of hospitalization for persistent air leak and pneumothorax), potential treatments for cancer (likely no surgery, so radiation or systemic therapy). He opted for continued clinical monitoring, will also repeat CT scans at interval.  - Recommended contacting clinic for exacerbations/respiratory infections as not to promote resistance in JAKY through azithro use  - PFTs next visit in  the event we need to plan for additional lung therapies  - follow up CT in 4months    # Vaccinations: he has declined vaccination    Follow up in 4 months            Reason for Visit  Robert B Behr is a 76 year old year old male who is being seen for follow up. No chief complaint on file.  Pulmonary HPI  Robert B Behr is a 76 year old male with a history of COPD, lung nodules, and tobacco use who presents for follow-up.     He rolls his own cigarettes and smokes 5-10 every day, makes sure to always use filters. He still has daily clear sputum production in the morning. He has been regularly swimming (20 minutes per day), and is using stair climber 4-5 days a week, previously reporting that he put the bike away because it was hard on his hips. Today he reports he would like to be biking more and has noticed some increased shortness of breath with stairs and overall cannot walk as far without resting. He is having to slow down the pace with exercise. He confirms use of his Wixela twice a day. He also uses Combivent a couple times per day. Slightly increased sputum in the morning as compared to prior, though still just clear. He reports increase in cough over the past 6 months or so as well but denies green or red sputum. Does report one episode of illness/bronchitis which he recieved antibiotics and prednisone for and believes this resolved his issue. Endorses chronic rhinorrhea but no sore throat, weight loss, fevers or chills, or weight loss.    He is holding out hope for stem cell therapies for COPD.     Current Outpatient Medications   Medication Sig Dispense Refill     acetaminophen (TYLENOL) 500 MG tablet Take 500-1,000 mg by mouth every 6 hours as needed for mild pain.       albuterol (PROAIR HFA) 108 (90 Base) MCG/ACT inhaler INHALE 1 TO 2 PUFFS BY MOUTH EVERY 4 HOURS AS NEEDED FOR SHORTNESS OF BREATH 8.5 g 3     atorvastatin (LIPITOR) 40 MG tablet Take 1 tablet (40 mg) by mouth daily 90 tablet 3      B-Complex-C TABS Take 1 tablet by mouth 2 times daily. 180 tablet 3     Calcium Carbonate-Vitamin D (CALCIUM 600 +D HIGH POTENCY) 600-10 MG-MCG TABS Take 1 tablet by mouth 2 times daily. 180 tablet 1     COMBIVENT RESPIMAT  MCG/ACT inhaler INHALE 1 PUFF INTO THE LUNGS FOUR TIMES DAILY AS NEEDED FOR SHORTNESS OF BREATH OR WHEEZING OR COUGH. DO NOT EXCEED 6 DOSES PER DAY 4 g 4     fish oil-omega-3 fatty acids 1000 MG capsule Take 1 capsule (1 g) by mouth daily. 90 capsule 0     fluticasone-salmeterol (WIXELA INHUB) 500-50 MCG/ACT inhaler INHALE 1 PUFF INTO THE LUNGS TWICE DAILY 60 each 5     glucosamine-chondroitinoitin 750-600 MG TABS Take 1 tablet by mouth twice daily 180 tablet 3     ipratropium - albuterol 0.5 mg/2.5 mg/3 mL (DUONEB) 0.5-2.5 (3) MG/3ML neb solution Take 1 vial (3 mLs) by nebulization every 6 hours as needed for shortness of breath, wheezing or cough 90 mL 0     Selenium (SELENIMIN-200) 200 MCG TABS tablet Take 1 tablet (200 mcg) by mouth daily. 90 tablet 0     vitamin C (ASCORBIC ACID) 1000 MG TABS Take 1 tablet (1,000 mg) by mouth daily. 90 tablet 3     vitamin E (TOCOPHEROL) 1000 units (450 mg) CAPS capsule Take 1 capsule (1,000 Units) by mouth daily. 90 capsule 3     No current facility-administered medications for this visit.   No Known Allergies  Past Medical History:   Diagnosis Date     Cataract      COPD (chronic obstructive pulmonary disease) (H)     diagnosed with spirometry      Fractured lateral malleolus      Lung nodules     CT scan 2016     Osteoporosis      Polio 1952    left leg weak     Sepsis without acute organ dysfunction, due to unspecified organism (H)      Tobacco abuse        Past Surgical History:   Procedure Laterality Date     BRONCHOSCOPY RIGID OR FLEXIBLE W/TRANSENDOSCOPIC ENDOBRONCHIAL ULTRASOUND GUIDED N/A 2/12/2024    Procedure: Endobronchial Ultrasound Guided;  Surgeon: Paulo Bryan MD;  Location: [facility] OR     BRONCHOSCOPY, WITH BIOPSY, ROBOT ASSISTED  N/A 2/12/2024    Procedure: BRONCHOSCOPY, ROBOT-ASSISTED, WITH BIOPSY ION;  Surgeon: Paulo Bryan MD;  Location: [facility] OR     CATARACT IOL, RT/LT Left 09/15/2017    s/p CE/IOL left eye     CATARACT IOL, RT/LT Right      CV CORONARY ANGIOGRAM  10/23/2023    Procedure: CV CORONARY ANGIOGRAM;  Surgeon: Torin Parker MD;  Location:  [facility] CATH LAB     ESOPHAGOSCOPY, GASTROSCOPY, DUODENOSCOPY (EGD), COMBINED N/A 10/22/2023    Procedure: Esophagoscopy, gastroscopy, duodenoscopy (EGD), combined;  Surgeon: David Post MD;  Location:  GI     PHACOEMULSIFICATION CLEAR CORNEA WITH STANDARD INTRAOCULAR LENS IMPLANT Right 9/30/2016    Procedure: PHACOEMULSIFICATION CLEAR CORNEA WITH STANDARD INTRAOCULAR LENS IMPLANT;  Surgeon: Elly Valencia MD;  Location:  [facility]     PHACOEMULSIFICATION WITH STANDARD INTRAOCULAR LENS IMPLANT Left 9/15/2017    Procedure: PHACOEMULSIFICATION WITH STANDARD INTRAOCULAR LENS IMPLANT;  Left Eye Phacoemulsification with Standard Lens;  Surgeon: Elly Valencia MD;  Location: [facility] OR     WRIST SURGERY         Social History     Socioeconomic History     Marital status: Single     Spouse name: Not on file     Number of children: 0     Years of education: Not on file     Highest education level: Not on file   Occupational History     Occupation: Musician/ (retired)     Comment: taught audio engineering, played drums   Tobacco Use     Smoking status: Every Day     Current packs/day: 1.00     Average packs/day: 1 pack/day for 61.3 years (61.3 ttl pk-yrs)     Types: Cigarettes     Start date: 1964     Smokeless tobacco: Former     Tobacco comments:     *2ppd for 10 years, 1ppd for 25 years, now 5 cigs per day. Actively working on quitting.   Vaping Use     Vaping status: Never Used   Substance and Sexual Activity     Alcohol use: Yes     Alcohol/week: 6.0 standard drinks of alcohol     Types: 6 Cans of beer per week     Comment: Drinks for sporting  events.     Drug use: Not Currently     Types: Marijuana     Comment: Marijuana - as a youth     Sexual activity: Not Currently   Other Topics Concern     Parent/sibling w/ CABG, MI or angioplasty before 65F 55M? No   Social History Narrative    Single.  Retired.     No children    5 siblings:      No family history of bleeding, clotting disorders or complications with anesthesia.     Social Drivers of Health     Financial Resource Strain: Low Risk  (8/5/2024)    Financial Resource Strain      Within the past 12 months, have you or your family members you live with been unable to get utilities (heat, electricity) when it was really needed?: No   Food Insecurity: Low Risk  (8/5/2024)    Food Insecurity      Within the past 12 months, did you worry that your food would run out before you got money to buy more?: No      Within the past 12 months, did the food you bought just not last and you didn t have money to get more?: No   Transportation Needs: Low Risk  (8/5/2024)    Transportation Needs      Within the past 12 months, has lack of transportation kept you from medical appointments, getting your medicines, non-medical meetings or appointments, work, or from getting things that you need?: No   Physical Activity: Unknown (8/5/2024)    Exercise Vital Sign      Days of Exercise per Week: 4 days      Minutes of Exercise per Session: Not on file   Stress: No Stress Concern Present (8/5/2024)    [employer] [place] [employer] - Occupational Stress Questionnaire      Feeling of Stress : Not at all   Social Connections: Unknown (8/5/2024)    Social Connection and Isolation Panel [NHANES]      Frequency of Communication with Friends and Family: Not on file      Frequency of Social Gatherings with Friends and Family: Three times a week      Attends [employer] Services: Not on file      Active Member of Clubs or Organizations: Not on file      Attends Club or Organization Meetings: Not on file      Marital Status: Not  on file   Interpersonal Safety: Low Risk  (8/5/2024)    Interpersonal Safety      Do you feel physically and emotionally safe where you currently live?: Yes      Within the past 12 months, have you been hit, slapped, kicked or otherwise physically hurt by someone?: No      Within the past 12 months, have you been humiliated or emotionally abused in other ways by your partner or ex-partner?: No   Housing Stability: Low Risk  (8/5/2024)    Housing Stability      Do you have housing? : Yes      Are you worried about losing your housing?: No     A complete ROS was otherwise negative except as noted in the HPI.  There were no vitals taken for this visit.  Exam:   GENERAL APPEARANCE: Well developed, well nourished, alert, and in no apparent distress.  RESP: normal percussion, good air flow throughout.  No crackles. No rhonchi. No wheezes.  NEURO: Mentation intact, speech normal, normal strength and tone, normal gait and stance  PSYCH: mentation appears normal. and affect normal/bright  Results:  Spirometry March 2020 at [facility] moderate obstruction normal diffusion    CT chest 11/2024  IMPRESSION:   1. Stable masslike consolidation with coarse internal calcification in  the posterolateral right upper lobe, favored infectious/inflammatory,  though malignancy remains a consideration.  2. Resolved focal nodular consolidation in the anterior right upper  lobe and new focal nodular opacities in the left upper lobe, likely  waning and waxing infectious processes.  3. Advanced obstructive emphysematous change.  4. Moderate hiatal hernia.    FNA 2/2024  Final Diagnosis   A. LUNG, UPPER LOBE, RIGHT, RIGHT UPPER LOBE NODULE, FINE NEEDLE ASPIRATE:  - Atypical cells present (see comment)  Adequacy: Satisfactory for evaluation but limited by scant cellularity on the cellblock     B. LYMPH NODE(S), STATION 7, FINE NEEDLE ASPIRATE:  - Polymorphous population of lymphocytes  - No evidence of epithelial malignancy  Adequacy: Satisfactory  for evaluation     C. LYMPH NODE(S), STATION 4R, FINE NEEDLE ASPIRATE:  - Atypical cells present  in background of polymorphous population of lymphocytes (see comment)  Adequacy: Satisfactory for evaluation but limited by scant cellularity     D. LYMPH NODE(S), STATION 11R, FINE NEEDLE ASPIRATE:  - Nondiagnostic specimen  Adequacy: Unsatisfactory for evaluation due to absence of lymphoid elements   Comment    Cytological preparations from specimen A show few clusters of atypical epithelial cells in a background of histiocytic inflammation and giant cells.  These cells are somewhat concerning but do not exhibit unequivocal features of malignancy.  Please correlate with concurrent surgical pathology report [id number].  If the clinical suspicion remains high, repeat sampling is recommended.     Cell block from specimen C shows few atypical cell groups in a background of polymorphous lymphoid cells.  These atypical cells are similar in morphology to those seen in specimen A.  However, unequivocal features of malignancy are not seen.      This case was seen in consultation with Dr Andre and Dr. Perkins.         Robot assisted bronchoscopy biopsy 2/2024  Final Diagnosis   A.  LUNG, RIGHT UPPER LOBE NODULE, BIOPSY:  -Rare small atypical glands embedded in pulmonary scar, see comment.     B.  LUNG, LEFT UPPER LOBE ENDOBRONCHIAL LESION, BIOPSY:  -Benign respiratory mucosa with submucosal scarring and focal smooth muscle hyperplasia.     Comment  [facility]   In part A, rare small atypical glands are seen in a background of scarring (additional levels were examined).  Rebiopsy is recommended if there is clinical/radiologic suspicion for malignancy.   Again, thank you for allowing me to participate in the care of your patient.        Sincerely,        Svitlana Taylor MD    Electronically signed Improved.

## 2025-05-14 ENCOUNTER — APPOINTMENT (OUTPATIENT)
Dept: UROLOGY | Facility: CLINIC | Age: 70
End: 2025-05-14
Payer: MEDICARE

## 2025-05-14 PROCEDURE — 51705 CHANGE OF BLADDER TUBE: CPT

## 2025-05-17 NOTE — DISCHARGE NOTE PROVIDER - NSDCDCMDCOMP_GEN_ALL_CORE
Hospital Medicine Daily Progress Note    Date of Service  5/17/2025    Chief Complaint  Cornelio Deluna is a 39 y.o. male admitted 5/13/2025 with shortness of breath    Hospital Course  Cornelio Deluna is a 39 y.o. male with history of bladder disorder, amphetamine abuse, prior history of smoking, who presented 5/13/2025 with complaints of nonproductive cough, shortness of breath for 2 weeks.  He reported subjective chills and fever in the last 2 days.  Denies lower extremity edema or chest pain.  He was seen at urgent care on 5/6 complaining of cough, sore throat, chest pain due to cough, prescribed Medrol pack.  He was then seen at ER on 5/11, COVID-19 and influenza negative, chest x-ray showed no infiltrates.  Was thought that he has viral condition.  Presented tachycardic, tachypneic, blood pressure 203/116, requires 6 L nasal cannula at this time.  CBC showed leukocytosis 15.2.  Pro BNP and troponin are not elevated.  CTA of the chest: No evidence of PE.  Hazy groundglass opacities in bilateral lung bases.  COVID-19 influenza RSV PCR negative.  Treatment included Solu-Medrol.  ERP patient had expiratory wheezes.  On my exam there are no wheezes, but bibasilar Rales    Interval Problem Update  5/14: Patient is new to me today, patient is sitting at the edge of the bed, family is at bedside, he is alert oriented follows commands, trace lower extremity edema, chest x-ray showed cephalization, I reviewed echocardiogram that showed normal EF, CTA chest was negative for PE but showed probably bilateral infiltrates, continue IV antibiotics, continue IV Lasix, discussed with bedside nurse discussed with charge nurse pharmacist.  5/15 patient is resting in bed, he is sitting at the edge of the bed, family is at bedside, he is alert oriented follows commands he is able to speak in full sentences, no fever no chills, discussed regarding results available, discussed with bedside nurse  charge nurse  pharmacist, continue incentive spirometry continue weaning off oxygen    5/16  No acute events overnight.  Patient still feels short of breath but improved from prior.  Reports his breathing gets worse overnight or when he lies flat.  Still needs up to 6 L oxygen with activity.  White count improving.  Other labs within normal limits.  Continue ceftriaxone, Lasix.  Add steroids today to monitor response.    5/17  No acute events overnight.  Breathing better but still on 3.5 L oxygen.  Evaluated by pulmonology yesterday and recommends outpatient sleep study and +/- right heart cath.  Plan to continue steroid and diuresis today.  Home O2 eval in AM followed by possible discharge.    I have discussed this patient's plan of care and discharge plan at IDT rounds today with Case Management, Nursing, Nursing leadership, and other members of the IDT team.    Consultants/Specialty      Code Status  Full Code    Disposition  The patient is not medically cleared for discharge to home or a post-acute facility.  Anticipate discharge to: home with organized home healthcare and close outpatient follow-up    I have placed the appropriate orders for post-discharge needs.    Review of Systems  Review of Systems   Constitutional:  Negative for chills and fever.   Eyes:  Negative for blurred vision and double vision.   Respiratory:  Positive for shortness of breath. Negative for cough, hemoptysis and wheezing.    Cardiovascular:  Negative for chest pain, palpitations, claudication, leg swelling and PND.   Gastrointestinal:  Negative for heartburn, nausea and vomiting.   Genitourinary:  Negative for hematuria and urgency.   Musculoskeletal:  Negative for back pain and myalgias.   Skin:  Negative for rash.   Neurological:  Negative for dizziness and headaches.   Endo/Heme/Allergies:  Does not bruise/bleed easily.   Psychiatric/Behavioral:  Negative for depression.         Physical Exam  Temp:  [36.4 °C (97.5  °F)-36.7 °C (98 °F)] 36.7 °C (98 °F)  Pulse:  [60-81] 62  Resp:  [16-18] 16  BP: (106-129)/(60-82) 120/75  SpO2:  [90 %-96 %] 96 %    Physical Exam  Vitals and nursing note reviewed.   Constitutional:       General: He is not in acute distress.     Appearance: Normal appearance.   Eyes:      General:         Right eye: No discharge.         Left eye: No discharge.   Cardiovascular:      Rate and Rhythm: Normal rate and regular rhythm.      Pulses: Normal pulses.      Heart sounds: Normal heart sounds.   Pulmonary:      Effort: Pulmonary effort is normal. No respiratory distress.      Breath sounds: Rales present.   Abdominal:      General: Bowel sounds are normal. There is no distension.      Tenderness: There is no abdominal tenderness.   Musculoskeletal:         General: Normal range of motion.      Cervical back: Normal range of motion and neck supple.      Right lower leg: Edema present.      Left lower leg: Edema present.   Skin:     General: Skin is warm and dry.      Capillary Refill: Capillary refill takes less than 2 seconds.      Coloration: Skin is not jaundiced.   Neurological:      General: No focal deficit present.      Mental Status: He is alert and oriented to person, place, and time.      Cranial Nerves: No cranial nerve deficit.   Psychiatric:         Mood and Affect: Mood normal.         Behavior: Behavior normal.         Fluids    Intake/Output Summary (Last 24 hours) at 5/17/2025 1358  Last data filed at 5/17/2025 0600  Gross per 24 hour   Intake 450 ml   Output --   Net 450 ml        Laboratory  Recent Labs     05/15/25  0259 05/16/25  0107 05/17/25  0014   WBC 12.8* 10.3 8.3   RBC 4.67* 4.73 5.05   HEMOGLOBIN 13.6* 14.0 14.7   HEMATOCRIT 41.6* 42.1 44.5   MCV 89.1 89.0 88.1   MCH 29.1 29.6 29.1   MCHC 32.7 33.3 33.0   RDW 47.8 46.3 45.3   PLATELETCT 296 292 303   MPV 9.8 9.3 9.2     Recent Labs     05/15/25  0259 05/16/25 0107 05/17/25  0014   SODIUM 136 138 135   POTASSIUM 4.0 4.1 4.4    CHLORIDE 98 99 98   CO2 31 28 26   GLUCOSE 92 94 170*   BUN 18 17 14   CREATININE 0.80 0.90 0.82   CALCIUM 8.9 8.7 9.2                   Imaging  EC-ECHOCARDIOGRAM COMPLETE W/O CONT   Final Result      CT-CTA CHEST PULMONARY ARTERY W/ RECONS   Final Result      1.  No CT evidence of pulmonary embolism but evaluation is limited due to motion.   2.  Hazy groundglass opacity in the bilateral lung bases, likely atelectasis.         DX-CHEST-PORTABLE (1 VIEW)   Final Result         1. No acute cardiopulmonary abnormalities are identified.           Assessment/Plan  * Acute respiratory failure with hypoxia- (present on admission)  Assessment & Plan  -Presented with cough and shortness of breath for 2 weeks, hypoxia on 6 L nasal cannula, tachypnea, tachycardia, leukocytosis WBC 15.2  -CTA of the chest: Negative for PE; bibasilar groundglass opacities that could be atypical infection or edema  -COVID-19/influenza/RSV screen negative. Respiratory PCR panel negative.  -ECHO with normal EF and no wall motion abnormalities.  -Continue ceftriaxone, completed azithromycin for possible community-acquired pneumonia  -DuoNeb every 4 hours  -Given a dose of IV Solu-Medrol 125 mg once.  Currently on prednisone 40 mg daily.  -Pulmonology consulted.  Recommends outpatient sleep study and +/- right heart cath for pulmonary hypertension eval.  -Continuous pulse oximetry.  Maintain O2 saturations > 92%.  -Home O2 eval in AM.      Leukocytosis- (present on admission)  Assessment & Plan  -Leukocytosis trending down  -Procalcitonin is negative    Hyperglycemia- (present on admission)  Assessment & Plan  -A1c 6    Hypertensive urgency- (present on admission)  Assessment & Plan  -Presented with blood pressure 203/119, spontaneously improved  -IV Lasix 40 mg once for possible mild fluid overload  -IV hydralazine as needed             VTE prophylaxis: Lovenox    I have performed a physical exam and reviewed and updated ROS and Plan today  (5/17/2025). In review of yesterday's note (5/16/2025), there are no changes except as documented above.      Total time of 51 minutes spent prepping to see patient (e.g. reviewing  tests/imaging results, notes from consultants, bedside nurse, night shift ) obtaining and/or reviewing separately obtained history. Performing a medically appropriate examination and evaluation.  Counseling and educating the patient.  Ordering medications, tests, or procedures.  Referring and communicating with other health care professionals.  Documenting clinical information in EPIC.  Independently interpreting results and communicating results to patient.  Care coordination.     This document is complete and the patient is ready for discharge.

## 2025-05-27 NOTE — ED PROVIDER NOTE - DISCUSSED CASE WITH MULTISELECT OPTIONS
Critical lab: WBC=1.4 and platelet=6K  Time: 1304  Read back and Verified(Lab Staff Name): Lucero NAYLOR Provider Notified: Dr. Argueta   
Consultant

## 2025-06-06 ENCOUNTER — INPATIENT (INPATIENT)
Facility: HOSPITAL | Age: 70
LOS: 2 days | Discharge: HOME | DRG: 203 | End: 2025-06-09
Attending: STUDENT IN AN ORGANIZED HEALTH CARE EDUCATION/TRAINING PROGRAM | Admitting: INTERNAL MEDICINE
Payer: MEDICARE

## 2025-06-06 VITALS
OXYGEN SATURATION: 95 % | TEMPERATURE: 99 F | RESPIRATION RATE: 22 BRPM | DIASTOLIC BLOOD PRESSURE: 67 MMHG | HEART RATE: 115 BPM | SYSTOLIC BLOOD PRESSURE: 140 MMHG

## 2025-06-06 DIAGNOSIS — J45.901 UNSPECIFIED ASTHMA WITH (ACUTE) EXACERBATION: ICD-10-CM

## 2025-06-06 DIAGNOSIS — Z93.59 OTHER CYSTOSTOMY STATUS: Chronic | ICD-10-CM

## 2025-06-06 DIAGNOSIS — Z93.1 GASTROSTOMY STATUS: Chronic | ICD-10-CM

## 2025-06-06 DIAGNOSIS — Z11.52 ENCOUNTER FOR SCREENING FOR COVID-19: ICD-10-CM

## 2025-06-06 DIAGNOSIS — Z98.890 OTHER SPECIFIED POSTPROCEDURAL STATES: Chronic | ICD-10-CM

## 2025-06-06 LAB
ANION GAP SERPL CALC-SCNC: 12 MMOL/L — SIGNIFICANT CHANGE UP (ref 7–14)
BASOPHILS # BLD AUTO: 0.02 K/UL — SIGNIFICANT CHANGE UP (ref 0–0.2)
BASOPHILS NFR BLD AUTO: 0.3 % — SIGNIFICANT CHANGE UP (ref 0–1)
BUN SERPL-MCNC: 6 MG/DL — LOW (ref 10–20)
CALCIUM SERPL-MCNC: 9.1 MG/DL — SIGNIFICANT CHANGE UP (ref 8.4–10.5)
CHLORIDE SERPL-SCNC: 98 MMOL/L — SIGNIFICANT CHANGE UP (ref 98–110)
CO2 SERPL-SCNC: 25 MMOL/L — SIGNIFICANT CHANGE UP (ref 17–32)
CREAT SERPL-MCNC: 0.6 MG/DL — LOW (ref 0.7–1.5)
EGFR: 104 ML/MIN/1.73M2 — SIGNIFICANT CHANGE UP
EGFR: 104 ML/MIN/1.73M2 — SIGNIFICANT CHANGE UP
EOSINOPHIL # BLD AUTO: 0.18 K/UL — SIGNIFICANT CHANGE UP (ref 0–0.7)
EOSINOPHIL NFR BLD AUTO: 2.5 % — SIGNIFICANT CHANGE UP (ref 0–8)
FLUAV AG NPH QL: SIGNIFICANT CHANGE UP
FLUBV AG NPH QL: SIGNIFICANT CHANGE UP
GLUCOSE SERPL-MCNC: 92 MG/DL — SIGNIFICANT CHANGE UP (ref 70–99)
HCT VFR BLD CALC: 39.9 % — LOW (ref 42–52)
HGB BLD-MCNC: 13.6 G/DL — LOW (ref 14–18)
IMM GRANULOCYTES NFR BLD AUTO: 0.3 % — SIGNIFICANT CHANGE UP (ref 0.1–0.3)
LACTATE SERPL-SCNC: 1.3 MMOL/L — SIGNIFICANT CHANGE UP (ref 0.7–2)
LYMPHOCYTES # BLD AUTO: 1.1 K/UL — LOW (ref 1.2–3.4)
LYMPHOCYTES # BLD AUTO: 15.3 % — LOW (ref 20.5–51.1)
MCHC RBC-ENTMCNC: 29.1 PG — SIGNIFICANT CHANGE UP (ref 27–31)
MCHC RBC-ENTMCNC: 34.1 G/DL — SIGNIFICANT CHANGE UP (ref 32–37)
MCV RBC AUTO: 85.4 FL — SIGNIFICANT CHANGE UP (ref 80–94)
MONOCYTES # BLD AUTO: 0.54 K/UL — SIGNIFICANT CHANGE UP (ref 0.1–0.6)
MONOCYTES NFR BLD AUTO: 7.5 % — SIGNIFICANT CHANGE UP (ref 1.7–9.3)
NEUTROPHILS # BLD AUTO: 5.33 K/UL — SIGNIFICANT CHANGE UP (ref 1.4–6.5)
NEUTROPHILS NFR BLD AUTO: 74.1 % — SIGNIFICANT CHANGE UP (ref 42.2–75.2)
NRBC BLD AUTO-RTO: 0 /100 WBCS — SIGNIFICANT CHANGE UP (ref 0–0)
PLATELET # BLD AUTO: 247 K/UL — SIGNIFICANT CHANGE UP (ref 130–400)
PMV BLD: 11.2 FL — HIGH (ref 7.4–10.4)
POTASSIUM SERPL-MCNC: 5 MMOL/L — SIGNIFICANT CHANGE UP (ref 3.5–5)
POTASSIUM SERPL-SCNC: 5 MMOL/L — SIGNIFICANT CHANGE UP (ref 3.5–5)
RBC # BLD: 4.67 M/UL — LOW (ref 4.7–6.1)
RBC # FLD: 14.1 % — SIGNIFICANT CHANGE UP (ref 11.5–14.5)
RSV RNA NPH QL NAA+NON-PROBE: SIGNIFICANT CHANGE UP
SARS-COV-2 RNA SPEC QL NAA+PROBE: SIGNIFICANT CHANGE UP
SODIUM SERPL-SCNC: 135 MMOL/L — SIGNIFICANT CHANGE UP (ref 135–146)
SOURCE RESPIRATORY: SIGNIFICANT CHANGE UP
WBC # BLD: 7.19 K/UL — SIGNIFICANT CHANGE UP (ref 4.8–10.8)
WBC # FLD AUTO: 7.19 K/UL — SIGNIFICANT CHANGE UP (ref 4.8–10.8)

## 2025-06-06 PROCEDURE — 93005 ELECTROCARDIOGRAM TRACING: CPT

## 2025-06-06 PROCEDURE — 94640 AIRWAY INHALATION TREATMENT: CPT

## 2025-06-06 PROCEDURE — 92610 EVALUATE SWALLOWING FUNCTION: CPT | Mod: GN

## 2025-06-06 PROCEDURE — 85730 THROMBOPLASTIN TIME PARTIAL: CPT

## 2025-06-06 PROCEDURE — 81001 URINALYSIS AUTO W/SCOPE: CPT

## 2025-06-06 PROCEDURE — 83735 ASSAY OF MAGNESIUM: CPT

## 2025-06-06 PROCEDURE — 36415 COLL VENOUS BLD VENIPUNCTURE: CPT

## 2025-06-06 PROCEDURE — 85025 COMPLETE CBC W/AUTO DIFF WBC: CPT

## 2025-06-06 PROCEDURE — 80053 COMPREHEN METABOLIC PANEL: CPT

## 2025-06-06 PROCEDURE — 80048 BASIC METABOLIC PNL TOTAL CA: CPT

## 2025-06-06 PROCEDURE — 71250 CT THORAX DX C-: CPT | Mod: 26

## 2025-06-06 PROCEDURE — 99285 EMERGENCY DEPT VISIT HI MDM: CPT | Mod: FS

## 2025-06-06 PROCEDURE — 85610 PROTHROMBIN TIME: CPT

## 2025-06-06 PROCEDURE — 87899 AGENT NOS ASSAY W/OPTIC: CPT

## 2025-06-06 PROCEDURE — 84145 PROCALCITONIN (PCT): CPT

## 2025-06-06 PROCEDURE — 93010 ELECTROCARDIOGRAM REPORT: CPT

## 2025-06-06 PROCEDURE — 87086 URINE CULTURE/COLONY COUNT: CPT

## 2025-06-06 PROCEDURE — 93970 EXTREMITY STUDY: CPT

## 2025-06-06 PROCEDURE — 99223 1ST HOSP IP/OBS HIGH 75: CPT

## 2025-06-06 PROCEDURE — 71045 X-RAY EXAM CHEST 1 VIEW: CPT | Mod: 26

## 2025-06-06 PROCEDURE — 85379 FIBRIN DEGRADATION QUANT: CPT

## 2025-06-06 RX ORDER — IPRATROPIUM BROMIDE AND ALBUTEROL SULFATE .5; 2.5 MG/3ML; MG/3ML
3 SOLUTION RESPIRATORY (INHALATION) ONCE
Refills: 0 | Status: COMPLETED | OUTPATIENT
Start: 2025-06-06 | End: 2025-06-06

## 2025-06-06 RX ORDER — AZITHROMYCIN 250 MG
500 CAPSULE ORAL ONCE
Refills: 0 | Status: COMPLETED | OUTPATIENT
Start: 2025-06-06 | End: 2025-06-06

## 2025-06-06 RX ORDER — CEFTRIAXONE 500 MG/1
1000 INJECTION, POWDER, FOR SOLUTION INTRAMUSCULAR; INTRAVENOUS ONCE
Refills: 0 | Status: COMPLETED | OUTPATIENT
Start: 2025-06-06 | End: 2025-06-06

## 2025-06-06 RX ORDER — SODIUM CHLORIDE 9 G/1000ML
1000 INJECTION, SOLUTION INTRAVENOUS ONCE
Refills: 0 | Status: COMPLETED | OUTPATIENT
Start: 2025-06-06 | End: 2025-06-06

## 2025-06-06 RX ORDER — METHYLPREDNISOLONE ACETATE 80 MG/ML
125 INJECTION, SUSPENSION INTRA-ARTICULAR; INTRALESIONAL; INTRAMUSCULAR; SOFT TISSUE ONCE
Refills: 0 | Status: COMPLETED | OUTPATIENT
Start: 2025-06-06 | End: 2025-06-06

## 2025-06-06 RX ADMIN — IPRATROPIUM BROMIDE AND ALBUTEROL SULFATE 3 MILLILITER(S): .5; 2.5 SOLUTION RESPIRATORY (INHALATION) at 17:14

## 2025-06-06 RX ADMIN — CEFTRIAXONE 100 MILLIGRAM(S): 500 INJECTION, POWDER, FOR SOLUTION INTRAMUSCULAR; INTRAVENOUS at 17:30

## 2025-06-06 RX ADMIN — IPRATROPIUM BROMIDE AND ALBUTEROL SULFATE 3 MILLILITER(S): .5; 2.5 SOLUTION RESPIRATORY (INHALATION) at 13:40

## 2025-06-06 RX ADMIN — Medication 250 MILLIGRAM(S): at 17:31

## 2025-06-06 RX ADMIN — SODIUM CHLORIDE 1000 MILLILITER(S): 9 INJECTION, SOLUTION INTRAVENOUS at 13:09

## 2025-06-06 RX ADMIN — METHYLPREDNISOLONE ACETATE 125 MILLIGRAM(S): 80 INJECTION, SUSPENSION INTRA-ARTICULAR; INTRALESIONAL; INTRAMUSCULAR; SOFT TISSUE at 17:30

## 2025-06-06 NOTE — H&P ADULT - ATTENDING COMMENTS
69 year old male with PMH of CP, seizure disorder, hx of VRE UTI, BPH, suprapubic catheter (May 2024), PEG tube, bed/wheelchair bound who presents from group home for SOB x2 days associated with productive cough. Patient states this feels similar to when he had pneumonia. Pt denies other complaints at this time.    PHYSICAL EXAM  GENERAL: NAD, lying in bed, on RA  HEENT:  Atraumatic, normocephalic, no JVD  HEART: Regular rate and rhythm, no murmurs, rubs, or gallops  LUNGS: Unlabored respirations.  mild bilateral rhonchi  ABDOMEN: Soft, nontender, nondistended, +BS. PEG clean   EXTREMITIES: 2+ peripheral pulses bilaterally. No clubbing, cyanosis, or edema  NERVOUS SYSTEM:  Answers questions and able to communicate and relay symptoms  SKIN: No rashes or bruises      #SOB and cough 2/2 suspected (aspiration?) pneumonia  #SIRS+ POA  - Pt with cough and SOB for 2 days  - In ED: , RR 22, satting 95% on RA, WBC 7k, lactate 1.3  - CXR no acute pulmonary process   - CT chest: No definite CT evidence for acute intrathoracic pathology. Left lower lobe and lingular opacities, possibly atelectasis.  - RVP negative  - s/p rocephin, azithro, solumedrol and nebs in ED  - Lung exam with no wheezing. Mild bilateral rhonchi heard  - Based on data not a clear case of pna, also doubt asthma exacerbation but will cover with abx for now pending workup  - c/w rocephin and azithro  - F/u BCx, procal, MRSA  - Aspiration precautions, HOB > 45  - S&S eval (pt on PO diet in addition to PEG)    #hx of suprapubic catheterization colonization  - follows with urology Dr Cooper/Dr Guardado    #constipation  - CXR with dilated abdominal colonic loops   - continue aggressive bowel regimen    #cerebral palsy  #spastic quadriplegia  - continue home phenobarb, baclofen, nutrients, and creams    # Misc  -DVT ppx: lovenox 40mg QD  -GI ppx: not indicated  -Diet: puree with moderately thick liquids, meds through PEG tube  -Activity: as tolerated

## 2025-06-06 NOTE — ED ADULT NURSE NOTE - CHIEF COMPLAINT QUOTE
pt sent to ED from [facility] for persistent cough with wheezing. history of pneumonia. unknown fever

## 2025-06-06 NOTE — ED PROVIDER NOTE - PHYSICAL EXAMINATION
CONST: Well appearing in NAD  EYES: PERRL, EOMI, Sclera and conjunctiva clear.   ENT: No nasal discharge. TM's clear B/L without drainage. Oropharynx normal appearing, no erythema or exudates. Uvula midline.  NECK: Non-tender, no meningeal signs  CARD: Normal S1 S2; Normal rate and rhythm  RESP: Equal BS B/L, No wheezes, rhonchi or rales. No distress  GI: Soft, non-tender, non-distended.  MS: Normal ROM in all extremities. No midline spinal tenderness.  SKIN: Warm, dry, no acute rashes. Good turgor  NEURO: A&Ox3, No focal deficits. Strength 5/5 with no sensory deficits. Steady gait
CONST: Well appearing with CP but in NAD other than occ coughing  EYES: PERRL, EOMI, Sclera and conjunctiva clear.   ENT:. Oropharynx normal appearing, no erythema or exudates. Uvula midline.  NECK: Non-tender, no meningeal signs  CARD: tachy but reg  RESP: diffuse rhonchi with right sided crackles  GI: Soft, non-tender, non-distended.  MS: Normal ROM in all extremities. No midline spinal tenderness.  SKIN: Warm, dry, no acute rashes. Good turgor  NEURO: A&Ox3, spastic CP

## 2025-06-06 NOTE — H&P ADULT - NSCORESITESY/N_GEN_A_CORE_RD
BP recheck at 1540 was 148/68.  Dr Sorenson advised.  
Isela is a 24 year old year old female here for an OB check.  She is      .   Gestational Age 37w6d.      She reports fetal movement  She reports BH contractions  She denies bleeding  She denies headaches  She denies edema  She denies abdominal pain  She denies rupture of membranes  She denies vision changes    Denies known Latex allergy or symptoms of Latex sensitivity.  Medications verified, no changes.  
Subjective:      Isela Arroyo is a 24 year old  female at 37w6d  gestation who presents with elevated BP in office.  Her current pregnancy is significant for routine prenatal care.  Patient reports no complaints.   She denies HA/vision changes and abdominal pain.    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    surgery & radiation     Past Surgical History:   Procedure Laterality Date   • Mouth surgery      Mucoepidermoid CA - mouth     SOCIAL HISTORY:   Social History   Substance Use Topics   • Smoking status: Never Smoker   • Smokeless tobacco: Never Used   • Alcohol use No      Comment: not while pregnant       Sexually Active: Yes             Partners with: Male       Comment: off BC 2015      Drug Use:    No              Review of patient's social economics indicates:   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   homemaker                 Comment: since first child    Family History   Problem Relation Age of Onset   • Hypertension Maternal Aunt 55     CHF, pacemaker   • Hypertension Maternal Aunt    • Diabetes Paternal Grandfather    • Genetic Disorder Other      negative both sides   • Cancer Maternal Grandmother 75     breast        Objective:      Visit Vitals  /68   Wt 80.7 kg   LMP 2017   BMI 31.52 kg/m²       General:   alert   FHT: 135   Presentations:    Cervix:                             Extremities: Non-tender, no edema           Assessment:     > IUP at 37w6d  > elevated BP at office visit     Plan:     > check PIH labs  >monitor FHR  >serial BPs  > if all normal she may be discharged home.  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 She has appointment on Monday. She is aware she may need to stay for induction with abnormalities in BP or labs or fetal status.  
No

## 2025-06-06 NOTE — ED PROVIDER NOTE - OBJECTIVE STATEMENT
Patient with history of cerebral palsy, seizure disorder, aspiration pneumonia presents with 2 days of shortness of breath productive cough.  Denies fevers or chills.  Admits to some shortness of breath.  Patient states he feels the same as he did when he had pneumonia.

## 2025-06-06 NOTE — H&P ADULT - ASSESSMENT
Impression:    # Impression:    #SOB  #SIRS positive POA (tachycardia, tachypnea)  - RVP negative  - CT chest showing left lower lobe opacities/atelectasis with chronic elevated L hemidiaphragm  - no leukocytosis, afebrile  PLAN:  - follow up blood cultures  - follow up UA given hx of chronic UTI  - monitor for fevers  - continue rocephin 1g q8h  - aspiration precautions    #hx of suprapubic catheterization colonization  - follows with urology Dr Cooper/Dr Guardado  - UA    #constipation  - continue aggressive bowel regimen    #cerebral palsy  #spastic quadriplegia  - continue home phenobarb, robaxin, nutrients, and creams    #DVT ppx: lovenox 40mg QD  #GI ppx: not indicated  #Diet: puree with moderately thick liquids, meds through PEG tube  #Activity: as tolerated  #Code Status: Full  #Dispo: admit to medicine   Impression:    #SOB  #SIRS positive POA (tachycardia, tachypnea)  - RVP negative  - CT chest showing left lower lobe opacities/atelectasis with chronic elevated L hemidiaphragm  - no leukocytosis, afebrile  PLAN:  - follow up blood cultures  - follow up UA given hx of chronic UTI  - monitor for fevers  - continue rocephin 1g q24h  - aspiration precautions    #hx of suprapubic catheterization colonization  - follows with urology Dr Cooper/Dr Guardado  - UA    #constipation  - continue aggressive bowel regimen    #cerebral palsy  #spastic quadriplegia  - continue home phenobarb, robaxin, nutrients, and creams    #DVT ppx: lovenox 40mg QD  #GI ppx: not indicated  #Diet: puree with moderately thick liquids, meds through PEG tube  #Activity: as tolerated  #Code Status: Full  #Dispo: admit to medicine   Impression:    #SOB  #SIRS positive POA (tachycardia, tachypnea)  #hx of MRSA  - RVP negative  - CT chest showing left lower lobe opacities/atelectasis with chronic elevated L hemidiaphragm  - no leukocytosis, afebrile  PLAN:  - follow up blood cultures  - follow up UA given hx of chronic UTI  - monitor for fevers  - continue rocephin 1g q24h  - aspiration precautions  - contact isolation    #hx of suprapubic catheterization colonization  - follows with urology Dr Cooper/Dr Guardado  - UA    #constipation  - continue aggressive bowel regimen    #cerebral palsy  #spastic quadriplegia  - continue home phenobarb, robaxin, nutrients, and creams    #DVT ppx: lovenox 40mg QD  #GI ppx: not indicated  #Diet: puree with moderately thick liquids, meds through PEG tube  #Activity: as tolerated  #Code Status: Full  #Dispo: admit to medicine

## 2025-06-06 NOTE — ED ADULT TRIAGE NOTE - CHIEF COMPLAINT QUOTE
pt sent to ED from [facility] for persistent cough with wheezing. history of pneumonia. unknown fever

## 2025-06-06 NOTE — ED ADULT NURSE NOTE - NSFALLHARMRISKINTERV_ED_ALL_ED
Communicate risk of Fall with Harm to all staff, patient, and family/Provide visual cue: red socks, yellow wristband, yellow gown, etc/Reinforce activity limits and safety measures with patient and family/Use of alarms - bed, stretcher, chair and/or video monitoring/Bed in lowest position, wheels locked, appropriate side rails in place/Call bell, personal items and telephone in reach/Instruct patient to call for assistance before getting out of bed/chair/stretcher/Non-slip footwear applied when patient is off stretcher/[place] to call system/Physically safe environment - no spills, clutter or unnecessary equipment/Purposeful Proactive Rounding/Room/bathroom lighting operational, light cord in reach

## 2025-06-06 NOTE — ED PROVIDER NOTE - CLINICAL SUMMARY MEDICAL DECISION MAKING FREE TEXT BOX
Pt with c/o sob and cough, found to have pna.  will admit for sepsis.  Any ordered labs and EKG were reviewed, Dr. María Lloyd, attending physician.  Any imaging was ordered and reviewed by me, Dr. María Lloyd, attending physician.  Appropriate medications for patient's presenting complaints were ordered and effects were reassessed.  Patient's records, if available,  (prior hospital, ED visit, and/or nursing home notes if available) were reviewed.  Additional history was obtained from EMS, family, and/or PCP (when available).  Escalation to admission/observation was considered. Patient requires inpatient hospitalization - monitored setting.

## 2025-06-06 NOTE — H&P ADULT - HISTORY OF PRESENT ILLNESS
Patient is a 69 year old male with PMH of CP, seizure disorder, hx of VRE UTI, BPH, suprapubic catheter (May 2024), PEG tube, bed/wheelchair bound who presents from group home for SOB x2 days associated with productive cough. Patient states this feels similar to when he had pneumonia.     ROS positive for SOB, negative for all other systems.    In the ED:  Vitals  /67    O2 sat 95% on RA  RR 22  Temp 98.8    Labs significant for Hgb 13.6 (baseline), MCV 85,   EKG: NSR  CXR: elevated left hemidiaphragm unchanged from prior  CT chest: left lower lobe opacity/atelectasis, bilateral peribronchial thickening  RVP negative Patient is a 69 year old male with PMH of CP, seizure disorder, hx of VRE UTI, BPH, suprapubic catheter (May 2024), PEG tube, bed/wheelchair bound who presents from group home for SOB x2 days associated with productive cough. Patient states this feels similar to when he had pneumonia.     ROS positive for SOB, negative for all other systems.    In the ED:  Vitals  /67    O2 sat 95% on RA  RR 22  Temp 98.8    Labs significant for Hgb 13.6 (baseline), MCV 85  EKG: NSR  CXR: elevated left hemidiaphragm unchanged from prior  CT chest: left lower lobe opacity/atelectasis, bilateral peribronchial thickening  RVP negative    Received 1L LR, 125mg IV solumedrol x1 dose, duonebs, 1g rocephin, 500mg azithromycin    Admitted to medicine for SIRS positive and SOB

## 2025-06-06 NOTE — ED PROVIDER NOTE - OBJECTIVE STATEMENT
Patient is a 44-year-old female with history of hypertension who presents with 1 day of neck pain dizziness nausea feeling very anxious.  Patient states the pain in her neck is worse with movement.  Denies weakness or numbness slurred speech or visual disturbances.  Patient states that the pain radiates to the back of her head.  Pain is intermittent since yesterday.  Has not taken anything to relieve the symptoms.  Patient states that she is under a lot of stress and feeling very sad because [place] and [place] family.  Has not seen her children in 3 years.  States that has been sad for the last 6 months.  Denies homicidal or suicidal ideations.  Denies chest pain, weakness, fevers or chills.  Patient does admit to occasional nausea.  Denies ear pain or ringing

## 2025-06-06 NOTE — ED ADULT NURSE NOTE - NSFALLOOBATTEMPT_ED_ALL_ED
HISTORY AND PHYSICAL                                                OBSTETRICS          Subjective:       Marcie Cheek is a 32 y.o.  PPD#4 s/p  presents for severely elevated BP at home. While in OB ED, patient was given procardia 10 and labetalol 20 for sustained severe range BP. Patient denies headache, scotoma, RUQ pain, worsening shortness of breath, or worsening edema.     This IUP was complicated by cHTN with DIANE w SF (on labetalol 300 mg BID), T2DM, morbid obesity, anemia, and depression anxiety.    PMHx:   Past Medical History:   Diagnosis Date    Anxiety     Depression     Diabetes mellitus affecting pregnancy in third trimester 3/14/2022    Hypertension        PSHx:   Past Surgical History:   Procedure Laterality Date    HYSTEROSCOPY      WISDOM TOOTH EXTRACTION         All: Review of patient's allergies indicates:  No Known Allergies    Meds:   Medications Prior to Admission   Medication Sig Dispense Refill Last Dose    acetaminophen (TYLENOL) 325 MG tablet Take 2 tablets (650 mg total) by mouth every 6 (six) hours as needed. 60 tablet 2     cyclobenzaprine (FLEXERIL) 10 MG tablet Take 0.5 tablets (5 mg total) by mouth daily as needed for Muscle spasms. 5 tablet 0     docusate sodium (COLACE) 100 MG capsule Take 2 capsules (200 mg total) by mouth 2 (two) times daily as needed for Constipation. 60 capsule 2     EScitalopram oxalate (LEXAPRO) 20 MG tablet Take 1 tablet (20 mg total) by mouth once daily. 30 tablet 2     famotidine (PEPCID ORAL) Take by mouth as needed.       furosemide (LASIX) 20 MG tablet Take 1 tablet (20 mg total) by mouth once daily. for 4 days 4 tablet 0     ibuprofen (ADVIL,MOTRIN) 800 MG tablet Take 1 tablet (800 mg total) by mouth every 8 (eight) hours as needed for Pain (Take scheduled for next 5-7 days, then as needed for pain). 60 tablet 1     labetaloL (NORMODYNE) 200 MG tablet Take 1.5 tablets (300 mg total) by mouth every 12 (twelve)  hours. 90 tablet 11     magnesium oxide 400 mg magnesium Tab Take 400 mg by mouth every 12 (twelve) hours as needed (headaches). 10 tablet 0     PNV no.1/iron,carb/docus/folic (PREN VIT COMB.1-IRON CB-FA-DSS ORAL) Take by mouth.          SH:   Social History     Socioeconomic History    Marital status:    Tobacco Use    Smoking status: Never Smoker    Smokeless tobacco: Never Used   Substance and Sexual Activity    Alcohol use: Not Currently    Drug use: Never    Sexual activity: Yes     Partners: Male     Social Determinants of Health     Financial Resource Strain: Low Risk     Difficulty of Paying Living Expenses: Not very hard   Food Insecurity: No Food Insecurity    Worried About Running Out of Food in the Last Year: Never true    Ran Out of Food in the Last Year: Never true   Transportation Needs: No Transportation Needs    Lack of Transportation (Medical): No    Lack of Transportation (Non-Medical): No   Physical Activity: Insufficiently Active    Days of Exercise per Week: 2 days    Minutes of Exercise per Session: 20 min   Stress: No Stress Concern Present    Feeling of Stress : Only a little   Social Connections: Unknown    Frequency of Communication with Friends and Family: Twice a week    Frequency of Social Gatherings with Friends and Family: Never    Active Member of Clubs or Organizations: No    Attends Club or Organization Meetings: Patient refused    Marital Status:    Housing Stability: Low Risk     Unable to Pay for Housing in the Last Year: No    Number of Places Lived in the Last Year: 1    Unstable Housing in the Last Year: No       FH:   Family History   Problem Relation Age of Onset    Thyroid disease Maternal Grandmother     Breast cancer Neg Hx     Colon cancer Neg Hx     Ovarian cancer Neg Hx        OBHx:   OB History    Para Term  AB Living   1 1 0 1 0 1   SAB IAB Ectopic Multiple Live Births   0 0 0 0 1      # Outcome Date GA Lbr  Harrison/2nd Weight Sex Delivery Anes PTL Lv   1  22 36w4d 31:20 / 01:16 2.79 kg (6 lb 2.4 oz) F Vag-Spont EPI Y TOM      Name: CEASAR ALLAN      Apgar1: 4  Apgar5: 6       Objective:       /64   Pulse 89   Temp 97.8 °F (36.6 °C) (Oral)   Resp 18   LMP 2021   SpO2 98%   Breastfeeding Yes     Vitals:    22 0940 22 0942 22 1000 22 1015   BP: (!) 164/78 (!) 127/59 136/69 132/64   Pulse:  97  89   Resp:    18   Temp:    97.8 °F (36.6 °C)   TempSrc:    Oral   SpO2:  97%  98%       General:   alert, appears stated age and cooperative   Lungs:   clear to auscultation bilaterally   Heart:   regular rate   Abdomen:  soft, non-tender; bowel sounds normal; no masses,  no organomegaly     Assessment:       PPD#4 s/p  for cHTN with superimposed pre-eclampsia.     Active Hospital Problems    Diagnosis  POA    *Chronic hypertension with superimposed preeclampsia [O11.9]  Unknown     (spontaneous vaginal delivery) [O80]  Not Applicable    Diabetes mellitus affecting pregnancy in third trimester [O24.913]  Yes    Obesity affecting pregnancy in third trimester [O99.213]  Yes    Rubella non-immune status, antepartum [Z28.39]  Not Applicable    Depression affecting pregnancy [O99.340, F32.A]  Yes      Resolved Hospital Problems   No resolved problems to display.          Plan:     1. cHTN with Superimposed Pre-Eclampsia w/ Severe Features:   - Asymptomatic   - Received procardia 10 in OB ED, pressures responded   - Continue home labetalol 300 BID; will titrate as needed   - Magnesium not indicated due to no lab abnormalities and no symptoms; denies SOB, headaches, RUQ pain, vision changes    2. T2DM   -POCT glucose on admin pending  -Preprandial and fasting glucoses ordered  -NPH 22 during pregnancy, NPH 10u nightly post partum      3. Morbid Obesity  - Encourage ambulation  - SCDs in place     4. Anemia   - Continue home fe/colace   -asymptomatic    5.  Depression/ Anxiety:   - Continue home enzo Mensah MD  OBGYN PGY-1       Yes

## 2025-06-06 NOTE — H&P ADULT - NSHPPHYSICALEXAM_GEN_ALL_CORE
Physical Exam:  General: NAD  HEENT: EOMI, conjunctiva and sclera clear, moist mucus membranes  Cardio: +S1/S2, no murmurs, rubs, or gallops, +pedal pulses  Pulm: diffuse rhonchi  Abd: PEG in place, no TTP, nondistended, no organomegaly  Neuro: AAOx4, CP  Skin: No rashes or lesions  MSK: no edema

## 2025-06-06 NOTE — H&P ADULT - NSHPLABSRESULTS_GEN_ALL_CORE
LABS:                          13.6   7.19  )-----------( 247      ( 06 Jun 2025 11:09 )             39.9     06-06    135  |  98  |  6[L]  ----------------------------<  92  5.0   |  25  |  0.6[L]    Ca    9.1      06 Jun 2025 11:09

## 2025-06-06 NOTE — ED PROVIDER NOTE - ATTENDING CONTRIBUTION TO CARE
69-year-old male history of cerebral palsy seizure disorder presents for evaluation of 2 days of productive cough mild shortness of breath.  No fever or chills.  Here exam patient no distress coughing diffuse rhonchi right-sided crackles S1-S2 tachycardic abdomen soft nontender.  Impression  Patient here with cough concern for aspiration pna versus pneumonia versus viral infection.  X-ray unremarkable.  pulse ox 94 CT Noncon  ct chest pending.  Of note chest x-ray without dilated loops of bowel but Abdomen soft nontender.

## 2025-06-06 NOTE — ED ADULT NURSE NOTE - OBJECTIVE STATEMENT
Pt from [facility] coming for  C/O  coughing ,SOB congestion ,wheezing  ,pt state  not felling well, coughing congested for  2 days ,pt on puree diet ,denies fever chills N/C /D or constipation .

## 2025-06-07 LAB
ALBUMIN SERPL ELPH-MCNC: 4.3 G/DL — SIGNIFICANT CHANGE UP (ref 3.5–5.2)
ALP SERPL-CCNC: 83 U/L — SIGNIFICANT CHANGE UP (ref 30–115)
ALT FLD-CCNC: 10 U/L — SIGNIFICANT CHANGE UP (ref 0–41)
ANION GAP SERPL CALC-SCNC: 11 MMOL/L — SIGNIFICANT CHANGE UP (ref 7–14)
APPEARANCE UR: CLEAR — SIGNIFICANT CHANGE UP
APTT BLD: 31.6 SEC — SIGNIFICANT CHANGE UP (ref 27–39.2)
AST SERPL-CCNC: 18 U/L — SIGNIFICANT CHANGE UP (ref 0–41)
BASOPHILS # BLD AUTO: 0.01 K/UL — SIGNIFICANT CHANGE UP (ref 0–0.2)
BASOPHILS NFR BLD AUTO: 0.1 % — SIGNIFICANT CHANGE UP (ref 0–1)
BILIRUB SERPL-MCNC: 0.2 MG/DL — SIGNIFICANT CHANGE UP (ref 0.2–1.2)
BILIRUB UR-MCNC: NEGATIVE — SIGNIFICANT CHANGE UP
BUN SERPL-MCNC: 9 MG/DL — LOW (ref 10–20)
CALCIUM SERPL-MCNC: 9 MG/DL — SIGNIFICANT CHANGE UP (ref 8.4–10.5)
CHLORIDE SERPL-SCNC: 103 MMOL/L — SIGNIFICANT CHANGE UP (ref 98–110)
CO2 SERPL-SCNC: 26 MMOL/L — SIGNIFICANT CHANGE UP (ref 17–32)
COLOR SPEC: YELLOW — SIGNIFICANT CHANGE UP
CREAT SERPL-MCNC: 0.6 MG/DL — LOW (ref 0.7–1.5)
D DIMER BLD IA.RAPID-MCNC: <150 NG/ML DDU — SIGNIFICANT CHANGE UP
DIFF PNL FLD: ABNORMAL
EGFR: 104 ML/MIN/1.73M2 — SIGNIFICANT CHANGE UP
EGFR: 104 ML/MIN/1.73M2 — SIGNIFICANT CHANGE UP
EOSINOPHIL # BLD AUTO: 0 K/UL — SIGNIFICANT CHANGE UP (ref 0–0.7)
EOSINOPHIL NFR BLD AUTO: 0 % — SIGNIFICANT CHANGE UP (ref 0–8)
GLUCOSE SERPL-MCNC: 104 MG/DL — HIGH (ref 70–99)
GLUCOSE UR QL: NEGATIVE MG/DL — SIGNIFICANT CHANGE UP
HCT VFR BLD CALC: 37.9 % — LOW (ref 42–52)
HGB BLD-MCNC: 12.7 G/DL — LOW (ref 14–18)
IMM GRANULOCYTES NFR BLD AUTO: 0.4 % — HIGH (ref 0.1–0.3)
INR BLD: 0.99 RATIO — SIGNIFICANT CHANGE UP (ref 0.65–1.3)
KETONES UR QL: NEGATIVE MG/DL — SIGNIFICANT CHANGE UP
LEGIONELLA AG UR QL: NEGATIVE — SIGNIFICANT CHANGE UP
LEUKOCYTE ESTERASE UR-ACNC: ABNORMAL
LYMPHOCYTES # BLD AUTO: 0.98 K/UL — LOW (ref 1.2–3.4)
LYMPHOCYTES # BLD AUTO: 10.8 % — LOW (ref 20.5–51.1)
MAGNESIUM SERPL-MCNC: 2.4 MG/DL — SIGNIFICANT CHANGE UP (ref 1.8–2.4)
MCHC RBC-ENTMCNC: 29.1 PG — SIGNIFICANT CHANGE UP (ref 27–31)
MCHC RBC-ENTMCNC: 33.5 G/DL — SIGNIFICANT CHANGE UP (ref 32–37)
MCV RBC AUTO: 86.9 FL — SIGNIFICANT CHANGE UP (ref 80–94)
MONOCYTES # BLD AUTO: 0.94 K/UL — HIGH (ref 0.1–0.6)
MONOCYTES NFR BLD AUTO: 10.4 % — HIGH (ref 1.7–9.3)
NEUTROPHILS # BLD AUTO: 7.11 K/UL — HIGH (ref 1.4–6.5)
NEUTROPHILS NFR BLD AUTO: 78.3 % — HIGH (ref 42.2–75.2)
NITRITE UR-MCNC: NEGATIVE — SIGNIFICANT CHANGE UP
NRBC BLD AUTO-RTO: 0 /100 WBCS — SIGNIFICANT CHANGE UP (ref 0–0)
PH UR: 8 — SIGNIFICANT CHANGE UP (ref 5–8)
PLATELET # BLD AUTO: 288 K/UL — SIGNIFICANT CHANGE UP (ref 130–400)
PMV BLD: 10.3 FL — SIGNIFICANT CHANGE UP (ref 7.4–10.4)
POTASSIUM SERPL-MCNC: 4.2 MMOL/L — SIGNIFICANT CHANGE UP (ref 3.5–5)
POTASSIUM SERPL-SCNC: 4.2 MMOL/L — SIGNIFICANT CHANGE UP (ref 3.5–5)
PROT SERPL-MCNC: 7.1 G/DL — SIGNIFICANT CHANGE UP (ref 6–8)
PROT UR-MCNC: NEGATIVE MG/DL — SIGNIFICANT CHANGE UP
PROTHROM AB SERPL-ACNC: 11.7 SEC — SIGNIFICANT CHANGE UP (ref 9.95–12.87)
RBC # BLD: 4.36 M/UL — LOW (ref 4.7–6.1)
RBC # FLD: 14.1 % — SIGNIFICANT CHANGE UP (ref 11.5–14.5)
S PNEUM AG UR QL: POSITIVE
SODIUM SERPL-SCNC: 140 MMOL/L — SIGNIFICANT CHANGE UP (ref 135–146)
SP GR SPEC: 1.01 — SIGNIFICANT CHANGE UP (ref 1–1.03)
UROBILINOGEN FLD QL: 0.2 MG/DL — SIGNIFICANT CHANGE UP (ref 0.2–1)
WBC # BLD: 9.08 K/UL — SIGNIFICANT CHANGE UP (ref 4.8–10.8)
WBC # FLD AUTO: 9.08 K/UL — SIGNIFICANT CHANGE UP (ref 4.8–10.8)

## 2025-06-07 PROCEDURE — 99233 SBSQ HOSP IP/OBS HIGH 50: CPT

## 2025-06-07 PROCEDURE — 93970 EXTREMITY STUDY: CPT | Mod: 26

## 2025-06-07 RX ORDER — EMOLLIENT COMBINATION NO.35
1 CREAM (GRAM) TOPICAL DAILY
Refills: 0 | Status: DISCONTINUED | OUTPATIENT
Start: 2025-06-07 | End: 2025-06-09

## 2025-06-07 RX ORDER — ENOXAPARIN SODIUM 100 MG/ML
40 INJECTION SUBCUTANEOUS EVERY 24 HOURS
Refills: 0 | Status: DISCONTINUED | OUTPATIENT
Start: 2025-06-07 | End: 2025-06-09

## 2025-06-07 RX ORDER — POLYETHYLENE GLYCOL 3350 17 G/17G
17 POWDER, FOR SOLUTION ORAL DAILY
Refills: 0 | Status: DISCONTINUED | OUTPATIENT
Start: 2025-06-07 | End: 2025-06-09

## 2025-06-07 RX ORDER — CEFTRIAXONE 500 MG/1
1000 INJECTION, POWDER, FOR SOLUTION INTRAMUSCULAR; INTRAVENOUS EVERY 24 HOURS
Refills: 0 | Status: DISCONTINUED | OUTPATIENT
Start: 2025-06-07 | End: 2025-06-08

## 2025-06-07 RX ORDER — CALCIUM CARBONATE/VITAMIN D3 500MG-5MCG
1 TABLET ORAL
Refills: 0 | Status: DISCONTINUED | OUTPATIENT
Start: 2025-06-07 | End: 2025-06-07

## 2025-06-07 RX ORDER — CALCIUM CARBONATE/VITAMIN D3 500MG-5MCG
1 TABLET ORAL
Refills: 0 | Status: DISCONTINUED | OUTPATIENT
Start: 2025-06-07 | End: 2025-06-09

## 2025-06-07 RX ORDER — SENNA 187 MG
2 TABLET ORAL AT BEDTIME
Refills: 0 | Status: DISCONTINUED | OUTPATIENT
Start: 2025-06-07 | End: 2025-06-09

## 2025-06-07 RX ORDER — AZITHROMYCIN 250 MG
500 CAPSULE ORAL DAILY
Refills: 0 | Status: DISCONTINUED | OUTPATIENT
Start: 2025-06-07 | End: 2025-06-09

## 2025-06-07 RX ORDER — BACLOFEN 10 MG/20ML
10 INJECTION INTRATHECAL EVERY 6 HOURS
Refills: 0 | Status: DISCONTINUED | OUTPATIENT
Start: 2025-06-07 | End: 2025-06-09

## 2025-06-07 RX ORDER — MELATONIN 5 MG
3 TABLET ORAL AT BEDTIME
Refills: 0 | Status: DISCONTINUED | OUTPATIENT
Start: 2025-06-07 | End: 2025-06-09

## 2025-06-07 RX ORDER — PHENOBARBITAL 30 MG/1
64.8 TABLET ORAL DAILY
Refills: 0 | Status: DISCONTINUED | OUTPATIENT
Start: 2025-06-07 | End: 2025-06-09

## 2025-06-07 RX ORDER — ACETAMINOPHEN 500 MG/5ML
650 LIQUID (ML) ORAL EVERY 6 HOURS
Refills: 0 | Status: DISCONTINUED | OUTPATIENT
Start: 2025-06-07 | End: 2025-06-09

## 2025-06-07 RX ORDER — AMMONIUM LACTATE 12 %
1 LOTION (ML) TOPICAL
Refills: 0 | Status: DISCONTINUED | OUTPATIENT
Start: 2025-06-07 | End: 2025-06-09

## 2025-06-07 RX ORDER — ALBUTEROL SULFATE 2.5 MG/3ML
2 VIAL, NEBULIZER (ML) INHALATION EVERY 6 HOURS
Refills: 0 | Status: DISCONTINUED | OUTPATIENT
Start: 2025-06-07 | End: 2025-06-09

## 2025-06-07 RX ORDER — ONDANSETRON HCL/PF 4 MG/2 ML
4 VIAL (ML) INJECTION EVERY 8 HOURS
Refills: 0 | Status: DISCONTINUED | OUTPATIENT
Start: 2025-06-07 | End: 2025-06-09

## 2025-06-07 RX ORDER — LANOLIN/MINERAL OIL/PETROLATUM
1 OINTMENT (GRAM) OPHTHALMIC (EYE)
Refills: 0 | Status: DISCONTINUED | OUTPATIENT
Start: 2025-06-07 | End: 2025-06-09

## 2025-06-07 RX ORDER — LACTULOSE 10 G/15ML
10 SOLUTION ORAL DAILY
Refills: 0 | Status: DISCONTINUED | OUTPATIENT
Start: 2025-06-07 | End: 2025-06-09

## 2025-06-07 RX ADMIN — BACLOFEN 10 MILLIGRAM(S): 10 INJECTION INTRATHECAL at 11:48

## 2025-06-07 RX ADMIN — Medication 1 APPLICATION(S): at 18:48

## 2025-06-07 RX ADMIN — Medication 1 TABLET(S): at 08:25

## 2025-06-07 RX ADMIN — PHENOBARBITAL 64.8 MILLIGRAM(S): 30 TABLET ORAL at 11:48

## 2025-06-07 RX ADMIN — Medication 500 MILLIGRAM(S): at 11:48

## 2025-06-07 RX ADMIN — Medication 2 PUFF(S): at 19:57

## 2025-06-07 RX ADMIN — ENOXAPARIN SODIUM 40 MILLIGRAM(S): 100 INJECTION SUBCUTANEOUS at 18:37

## 2025-06-07 RX ADMIN — POLYETHYLENE GLYCOL 3350 17 GRAM(S): 17 POWDER, FOR SOLUTION ORAL at 13:46

## 2025-06-07 RX ADMIN — BACLOFEN 10 MILLIGRAM(S): 10 INJECTION INTRATHECAL at 18:38

## 2025-06-07 RX ADMIN — Medication 2 PUFF(S): at 08:25

## 2025-06-07 RX ADMIN — BACLOFEN 10 MILLIGRAM(S): 10 INJECTION INTRATHECAL at 08:25

## 2025-06-07 RX ADMIN — Medication 1 APPLICATION(S): at 18:50

## 2025-06-07 RX ADMIN — Medication 2 PUFF(S): at 13:47

## 2025-06-07 RX ADMIN — Medication 1 TABLET(S): at 18:38

## 2025-06-07 RX ADMIN — Medication 1 APPLICATION(S): at 06:58

## 2025-06-07 RX ADMIN — Medication 2 TABLET(S): at 21:10

## 2025-06-07 RX ADMIN — CEFTRIAXONE 100 MILLIGRAM(S): 500 INJECTION, POWDER, FOR SOLUTION INTRAMUSCULAR; INTRAVENOUS at 18:37

## 2025-06-07 RX ADMIN — Medication 40 MILLIGRAM(S): at 08:25

## 2025-06-07 NOTE — PROGRESS NOTE ADULT - ATTENDING COMMENTS
#Shortness of breath  ct chest unrevealing, possible atelectasis  rvp neg  empiric ceftriaxone, azithro for now  sputum cx, legionella, strep, mrsa  va duplex, d-dimer  no hypoxia, satting well on ra    #Progress Note Handoff  Pending (specify): va duplex, d-dimer, cap w/u, iv abx  Family discussion: d/w pt at bedside  Disposition: group home

## 2025-06-08 DIAGNOSIS — Z87.438 PERSONAL HISTORY OF OTHER DISEASES OF MALE GENITAL ORGANS: ICD-10-CM

## 2025-06-08 DIAGNOSIS — G40.909 EPILEPSY, UNSPECIFIED, NOT INTRACTABLE, WITHOUT STATUS EPILEPTICUS: ICD-10-CM

## 2025-06-08 DIAGNOSIS — Z93.1 GASTROSTOMY STATUS: ICD-10-CM

## 2025-06-08 DIAGNOSIS — R06.02 SHORTNESS OF BREATH: ICD-10-CM

## 2025-06-08 DIAGNOSIS — Z79.899 OTHER LONG TERM (CURRENT) DRUG THERAPY: ICD-10-CM

## 2025-06-08 DIAGNOSIS — Z86.69 PERSONAL HISTORY OF OTHER DISEASES OF THE NERVOUS SYSTEM AND SENSE ORGANS: ICD-10-CM

## 2025-06-08 LAB
ALBUMIN SERPL ELPH-MCNC: 4.2 G/DL — SIGNIFICANT CHANGE UP (ref 3.5–5.2)
ALP SERPL-CCNC: 76 U/L — SIGNIFICANT CHANGE UP (ref 30–115)
ALT FLD-CCNC: 12 U/L — SIGNIFICANT CHANGE UP (ref 0–41)
ANION GAP SERPL CALC-SCNC: 15 MMOL/L — HIGH (ref 7–14)
AST SERPL-CCNC: 31 U/L — SIGNIFICANT CHANGE UP (ref 0–41)
BASOPHILS # BLD AUTO: 0.03 K/UL — SIGNIFICANT CHANGE UP (ref 0–0.2)
BASOPHILS NFR BLD AUTO: 0.4 % — SIGNIFICANT CHANGE UP (ref 0–1)
BILIRUB SERPL-MCNC: <0.2 MG/DL — SIGNIFICANT CHANGE UP (ref 0.2–1.2)
BUN SERPL-MCNC: 14 MG/DL — SIGNIFICANT CHANGE UP (ref 10–20)
CALCIUM SERPL-MCNC: 9.1 MG/DL — SIGNIFICANT CHANGE UP (ref 8.4–10.5)
CHLORIDE SERPL-SCNC: 104 MMOL/L — SIGNIFICANT CHANGE UP (ref 98–110)
CO2 SERPL-SCNC: 22 MMOL/L — SIGNIFICANT CHANGE UP (ref 17–32)
CREAT SERPL-MCNC: 0.6 MG/DL — LOW (ref 0.7–1.5)
EGFR: 104 ML/MIN/1.73M2 — SIGNIFICANT CHANGE UP
EGFR: 104 ML/MIN/1.73M2 — SIGNIFICANT CHANGE UP
EOSINOPHIL # BLD AUTO: 0.04 K/UL — SIGNIFICANT CHANGE UP (ref 0–0.7)
EOSINOPHIL NFR BLD AUTO: 0.5 % — SIGNIFICANT CHANGE UP (ref 0–8)
GLUCOSE SERPL-MCNC: 115 MG/DL — HIGH (ref 70–99)
HCT VFR BLD CALC: 37.8 % — LOW (ref 42–52)
HGB BLD-MCNC: 13 G/DL — LOW (ref 14–18)
IMM GRANULOCYTES NFR BLD AUTO: 0.3 % — SIGNIFICANT CHANGE UP (ref 0.1–0.3)
LYMPHOCYTES # BLD AUTO: 1.2 K/UL — SIGNIFICANT CHANGE UP (ref 1.2–3.4)
LYMPHOCYTES # BLD AUTO: 16.2 % — LOW (ref 20.5–51.1)
MAGNESIUM SERPL-MCNC: 2.2 MG/DL — SIGNIFICANT CHANGE UP (ref 1.8–2.4)
MCHC RBC-ENTMCNC: 29.6 PG — SIGNIFICANT CHANGE UP (ref 27–31)
MCHC RBC-ENTMCNC: 34.4 G/DL — SIGNIFICANT CHANGE UP (ref 32–37)
MCV RBC AUTO: 86.1 FL — SIGNIFICANT CHANGE UP (ref 80–94)
MONOCYTES # BLD AUTO: 0.77 K/UL — HIGH (ref 0.1–0.6)
MONOCYTES NFR BLD AUTO: 10.4 % — HIGH (ref 1.7–9.3)
NEUTROPHILS # BLD AUTO: 5.34 K/UL — SIGNIFICANT CHANGE UP (ref 1.4–6.5)
NEUTROPHILS NFR BLD AUTO: 72.2 % — SIGNIFICANT CHANGE UP (ref 42.2–75.2)
NRBC BLD AUTO-RTO: 0 /100 WBCS — SIGNIFICANT CHANGE UP (ref 0–0)
PLATELET # BLD AUTO: 293 K/UL — SIGNIFICANT CHANGE UP (ref 130–400)
PMV BLD: 10 FL — SIGNIFICANT CHANGE UP (ref 7.4–10.4)
POTASSIUM SERPL-MCNC: 4.1 MMOL/L — SIGNIFICANT CHANGE UP (ref 3.5–5)
POTASSIUM SERPL-SCNC: 4.1 MMOL/L — SIGNIFICANT CHANGE UP (ref 3.5–5)
PROCALCITONIN SERPL-MCNC: 0.03 NG/ML — SIGNIFICANT CHANGE UP (ref 0.02–0.1)
PROT SERPL-MCNC: 7.5 G/DL — SIGNIFICANT CHANGE UP (ref 6–8)
RBC # BLD: 4.39 M/UL — LOW (ref 4.7–6.1)
RBC # FLD: 14.2 % — SIGNIFICANT CHANGE UP (ref 11.5–14.5)
SODIUM SERPL-SCNC: 141 MMOL/L — SIGNIFICANT CHANGE UP (ref 135–146)
WBC # BLD: 7.4 K/UL — SIGNIFICANT CHANGE UP (ref 4.8–10.8)
WBC # FLD AUTO: 7.4 K/UL — SIGNIFICANT CHANGE UP (ref 4.8–10.8)

## 2025-06-08 PROCEDURE — 93010 ELECTROCARDIOGRAM REPORT: CPT

## 2025-06-08 PROCEDURE — 99232 SBSQ HOSP IP/OBS MODERATE 35: CPT

## 2025-06-08 RX ORDER — CEFPODOXIME PROXETIL 200 MG/1
200 TABLET, FILM COATED ORAL EVERY 12 HOURS
Refills: 0 | Status: DISCONTINUED | OUTPATIENT
Start: 2025-06-08 | End: 2025-06-09

## 2025-06-08 RX ADMIN — Medication 1 APPLICATION(S): at 05:43

## 2025-06-08 RX ADMIN — Medication 1 APPLICATION(S): at 17:03

## 2025-06-08 RX ADMIN — Medication 40 MILLIGRAM(S): at 05:43

## 2025-06-08 RX ADMIN — Medication 2 PUFF(S): at 12:20

## 2025-06-08 RX ADMIN — CEFPODOXIME PROXETIL 200 MILLIGRAM(S): 200 TABLET, FILM COATED ORAL at 17:05

## 2025-06-08 RX ADMIN — BACLOFEN 10 MILLIGRAM(S): 10 INJECTION INTRATHECAL at 12:11

## 2025-06-08 RX ADMIN — Medication 1 TABLET(S): at 05:43

## 2025-06-08 RX ADMIN — BACLOFEN 10 MILLIGRAM(S): 10 INJECTION INTRATHECAL at 17:04

## 2025-06-08 RX ADMIN — Medication 2 PUFF(S): at 21:07

## 2025-06-08 RX ADMIN — Medication 1 APPLICATION(S): at 17:10

## 2025-06-08 RX ADMIN — BACLOFEN 10 MILLIGRAM(S): 10 INJECTION INTRATHECAL at 23:29

## 2025-06-08 RX ADMIN — LACTULOSE 10 GRAM(S): 10 SOLUTION ORAL at 12:11

## 2025-06-08 RX ADMIN — Medication 2 TABLET(S): at 23:29

## 2025-06-08 RX ADMIN — POLYETHYLENE GLYCOL 3350 17 GRAM(S): 17 POWDER, FOR SOLUTION ORAL at 12:11

## 2025-06-08 RX ADMIN — Medication 500 MILLIGRAM(S): at 12:11

## 2025-06-08 RX ADMIN — BACLOFEN 10 MILLIGRAM(S): 10 INJECTION INTRATHECAL at 05:43

## 2025-06-08 RX ADMIN — Medication 2 PUFF(S): at 01:03

## 2025-06-08 RX ADMIN — Medication 2 PUFF(S): at 08:17

## 2025-06-08 RX ADMIN — Medication 1 APPLICATION(S): at 12:19

## 2025-06-08 RX ADMIN — Medication 1 TABLET(S): at 17:04

## 2025-06-08 RX ADMIN — BACLOFEN 10 MILLIGRAM(S): 10 INJECTION INTRATHECAL at 00:59

## 2025-06-08 RX ADMIN — ENOXAPARIN SODIUM 40 MILLIGRAM(S): 100 INJECTION SUBCUTANEOUS at 17:09

## 2025-06-08 RX ADMIN — PHENOBARBITAL 64.8 MILLIGRAM(S): 30 TABLET ORAL at 12:11

## 2025-06-08 NOTE — SWALLOW BEDSIDE ASSESSMENT ADULT - SLP GENERAL OBSERVATIONS
Pt found laying in bed Awake and alert. +oriented to name. Responding to yes/no questions Pt found laying in bed Awake and alert. A&OX3. Responding to yes/no questions

## 2025-06-08 NOTE — PROGRESS NOTE ADULT - PROBLEM SELECTOR PLAN 1
ct chest unrevealing, possible atelectasis  rvp neg  empiric ceftriaxone, azithro for now  sputum cx, legionella, strep, mrsa  va duplex, d-dimer  no hypoxia, satting well on ra ct chest unrevealing, possible atelectasis  rvp neg  vantin, azithro  sputum cx, legionella (neg), strep positive, mrsa   va duplex, d-dimer neg  no hypoxia, satting well on ra  PT, discharge planning

## 2025-06-08 NOTE — PROGRESS NOTE ADULT - PROBLEM/PLAN-3
DISPLAY PLAN FREE TEXT [None] : None [Good understanding] : Patient has a good understanding of lifestyle changes and steps needed to achieve self management goal

## 2025-06-08 NOTE — SWALLOW BEDSIDE ASSESSMENT ADULT - SWALLOW EVAL: DIAGNOSIS
+ toleration of puree and mod thick liquids w/o overt s/s of aspiration/penetration + toleration of puree and mod thick liquids via teaspoon w/o overt s/s of aspiration/penetration

## 2025-06-08 NOTE — SWALLOW BEDSIDE ASSESSMENT ADULT - SLP PERTINENT HISTORY OF CURRENT PROBLEM
Patient is a 69 year old male with PMH of CP, seizure disorder, hx of VRE UTI, BPH, suprapubic catheter (May 2024), PEG tube, bed/wheelchair bound who presents from group home for SOB x2 days associated with productive cough. Patient states this feels similar to when he had pneumonia.

## 2025-06-08 NOTE — SWALLOW BEDSIDE ASSESSMENT ADULT - COMMENTS
As per previous dysphagia eval pt consumed puree and mod thick liquids via teaspoon with MEDS via PEG

## 2025-06-08 NOTE — PROGRESS NOTE ADULT - NSPROGADDITIONALINFOA_GEN_ALL_CORE
#Progress Note Handoff  Pending (specify): PT, d/c planning  Family discussion: d/w pt at bedside  Disposition: group home .

## 2025-06-08 NOTE — SWALLOW BEDSIDE ASSESSMENT ADULT - SWALLOW EVAL: FUNCTIONAL LEVEL AT TIME OF EVAL
Awake and alert. +oriented to name. Responding to yes/no questions Awake and alert. A&OX3. Responding to yes/no questions

## 2025-06-09 ENCOUNTER — TRANSCRIPTION ENCOUNTER (OUTPATIENT)
Age: 70
End: 2025-06-09

## 2025-06-09 VITALS
TEMPERATURE: 99 F | DIASTOLIC BLOOD PRESSURE: 73 MMHG | RESPIRATION RATE: 19 BRPM | HEART RATE: 102 BPM | OXYGEN SATURATION: 96 % | SYSTOLIC BLOOD PRESSURE: 113 MMHG

## 2025-06-09 LAB
ANION GAP SERPL CALC-SCNC: 15 MMOL/L — HIGH (ref 7–14)
BASOPHILS # BLD AUTO: 0.03 K/UL — SIGNIFICANT CHANGE UP (ref 0–0.2)
BASOPHILS NFR BLD AUTO: 0.5 % — SIGNIFICANT CHANGE UP (ref 0–1)
BUN SERPL-MCNC: 16 MG/DL — SIGNIFICANT CHANGE UP (ref 10–20)
CALCIUM SERPL-MCNC: 8.8 MG/DL — SIGNIFICANT CHANGE UP (ref 8.4–10.5)
CHLORIDE SERPL-SCNC: 106 MMOL/L — SIGNIFICANT CHANGE UP (ref 98–110)
CO2 SERPL-SCNC: 22 MMOL/L — SIGNIFICANT CHANGE UP (ref 17–32)
CREAT SERPL-MCNC: 0.5 MG/DL — LOW (ref 0.7–1.5)
CULTURE RESULTS: SIGNIFICANT CHANGE UP
EGFR: 110 ML/MIN/1.73M2 — SIGNIFICANT CHANGE UP
EGFR: 110 ML/MIN/1.73M2 — SIGNIFICANT CHANGE UP
EOSINOPHIL # BLD AUTO: 0.05 K/UL — SIGNIFICANT CHANGE UP (ref 0–0.7)
EOSINOPHIL NFR BLD AUTO: 0.8 % — SIGNIFICANT CHANGE UP (ref 0–8)
GLUCOSE SERPL-MCNC: 106 MG/DL — HIGH (ref 70–99)
HCT VFR BLD CALC: 37.2 % — LOW (ref 42–52)
HGB BLD-MCNC: 12.5 G/DL — LOW (ref 14–18)
IMM GRANULOCYTES NFR BLD AUTO: 0.5 % — HIGH (ref 0.1–0.3)
LYMPHOCYTES # BLD AUTO: 1.18 K/UL — LOW (ref 1.2–3.4)
LYMPHOCYTES # BLD AUTO: 18.6 % — LOW (ref 20.5–51.1)
MAGNESIUM SERPL-MCNC: 2 MG/DL — SIGNIFICANT CHANGE UP (ref 1.8–2.4)
MCHC RBC-ENTMCNC: 29.3 PG — SIGNIFICANT CHANGE UP (ref 27–31)
MCHC RBC-ENTMCNC: 33.6 G/DL — SIGNIFICANT CHANGE UP (ref 32–37)
MCV RBC AUTO: 87.1 FL — SIGNIFICANT CHANGE UP (ref 80–94)
MONOCYTES # BLD AUTO: 0.58 K/UL — SIGNIFICANT CHANGE UP (ref 0.1–0.6)
MONOCYTES NFR BLD AUTO: 9.1 % — SIGNIFICANT CHANGE UP (ref 1.7–9.3)
NEUTROPHILS # BLD AUTO: 4.49 K/UL — SIGNIFICANT CHANGE UP (ref 1.4–6.5)
NEUTROPHILS NFR BLD AUTO: 70.5 % — SIGNIFICANT CHANGE UP (ref 42.2–75.2)
NRBC BLD AUTO-RTO: 0 /100 WBCS — SIGNIFICANT CHANGE UP (ref 0–0)
PLATELET # BLD AUTO: 297 K/UL — SIGNIFICANT CHANGE UP (ref 130–400)
PMV BLD: 10 FL — SIGNIFICANT CHANGE UP (ref 7.4–10.4)
POTASSIUM SERPL-MCNC: 3.8 MMOL/L — SIGNIFICANT CHANGE UP (ref 3.5–5)
POTASSIUM SERPL-SCNC: 3.8 MMOL/L — SIGNIFICANT CHANGE UP (ref 3.5–5)
RBC # BLD: 4.27 M/UL — LOW (ref 4.7–6.1)
RBC # FLD: 14.5 % — SIGNIFICANT CHANGE UP (ref 11.5–14.5)
SODIUM SERPL-SCNC: 143 MMOL/L — SIGNIFICANT CHANGE UP (ref 135–146)
SPECIMEN SOURCE: SIGNIFICANT CHANGE UP
WBC # BLD: 6.36 K/UL — SIGNIFICANT CHANGE UP (ref 4.8–10.8)
WBC # FLD AUTO: 6.36 K/UL — SIGNIFICANT CHANGE UP (ref 4.8–10.8)

## 2025-06-09 PROCEDURE — 99238 HOSP IP/OBS DSCHRG MGMT 30/<: CPT

## 2025-06-09 PROCEDURE — 93010 ELECTROCARDIOGRAM REPORT: CPT

## 2025-06-09 RX ORDER — AZITHROMYCIN 250 MG
250 CAPSULE ORAL DAILY
Refills: 0 | Status: DISCONTINUED | OUTPATIENT
Start: 2025-06-09 | End: 2025-06-09

## 2025-06-09 RX ORDER — AZITHROMYCIN 250 MG
500 CAPSULE ORAL DAILY
Refills: 0 | Status: DISCONTINUED | OUTPATIENT
Start: 2025-06-09 | End: 2025-06-09

## 2025-06-09 RX ORDER — AZITHROMYCIN 250 MG
1 CAPSULE ORAL
Qty: 1 | Refills: 0
Start: 2025-06-09 | End: 2025-06-09

## 2025-06-09 RX ORDER — CEFPODOXIME PROXETIL 200 MG/1
1 TABLET, FILM COATED ORAL
Qty: 7 | Refills: 0
Start: 2025-06-09 | End: 2025-06-11

## 2025-06-09 RX ORDER — CEFPODOXIME PROXETIL 200 MG/1
200 TABLET, FILM COATED ORAL EVERY 12 HOURS
Refills: 0 | Status: DISCONTINUED | OUTPATIENT
Start: 2025-06-09 | End: 2025-06-09

## 2025-06-09 RX ADMIN — Medication 1 APPLICATION(S): at 06:16

## 2025-06-09 RX ADMIN — Medication 1 TABLET(S): at 06:17

## 2025-06-09 RX ADMIN — BACLOFEN 10 MILLIGRAM(S): 10 INJECTION INTRATHECAL at 06:18

## 2025-06-09 RX ADMIN — Medication 1 APPLICATION(S): at 06:19

## 2025-06-09 RX ADMIN — Medication 2 PUFF(S): at 15:20

## 2025-06-09 RX ADMIN — Medication 250 MILLIGRAM(S): at 15:20

## 2025-06-09 RX ADMIN — Medication 40 MILLIGRAM(S): at 06:17

## 2025-06-09 RX ADMIN — PHENOBARBITAL 64.8 MILLIGRAM(S): 30 TABLET ORAL at 11:56

## 2025-06-09 RX ADMIN — CEFPODOXIME PROXETIL 200 MILLIGRAM(S): 200 TABLET, FILM COATED ORAL at 06:18

## 2025-06-09 RX ADMIN — Medication 2 PUFF(S): at 08:30

## 2025-06-09 RX ADMIN — Medication 1 APPLICATION(S): at 06:18

## 2025-06-09 RX ADMIN — BACLOFEN 10 MILLIGRAM(S): 10 INJECTION INTRATHECAL at 11:56

## 2025-06-09 NOTE — DISCHARGE NOTE NURSING/CASE MANAGEMENT/SOCIAL WORK - FINANCIAL ASSISTANCE
[facility] provides services at a reduced cost to those who are determined to be eligible through [facility]’s financial assistance program. Information regarding [facility]’s financial assistance program can be found by going to [email or web address] or by calling [phone].

## 2025-06-09 NOTE — PROGRESS NOTE ADULT - SUBJECTIVE AND OBJECTIVE BOX
INTERVAL HPI/OVERNIGHT EVENTS:  Patient was seen and examined at bedside. Tachycardic overnight, ECG performed and showed sinus tachycardia. HD stable    VITAL SIGNS:  T(F): 99.7 (06-09-25 @ 04:30)  HR: 123 (06-09-25 @ 04:30)  BP: 136/86 (06-09-25 @ 04:30)  RR: 18 (06-09-25 @ 04:30)  SpO2: 96% (06-09-25 @ 04:30)  Wt(kg): --    PHYSICAL EXAM:    Constitutional: WDWN, NAD  HEENT: PERRL, EOMI, sclera non-icteric, neck supple, trachea midline, no masses, no JVD, MMM, good dentition  Respiratory: CTA b/l, good air entry b/l, no wheezing, no rhonchi, no rales, without accessory muscle use and no intercostal retractions  Cardiovascular: RRR, normal S1S2, no M/R/G  Gastrointestinal: soft, NTND, no masses palpable, BS normal  Extremities: Warm, well perfused, pulses equal bilateral upper and lower extremities, no edema, no clubbing  Neurological: AAOx3  Skin: Normal temperature, warm, dry    MEDICATIONS  (STANDING):  albuterol    90 MICROgram(s) HFA Inhaler 2 Puff(s) Inhalation every 6 hours  ammonium lactate 12% Lotion 1 Application(s) Topical two times a day  azithromycin   Tablet 250 milliGRAM(s) Oral daily  baclofen 10 milliGRAM(s) Oral every 6 hours  calcium carbonate 1250 mG  + Vitamin D (OsCal 500 + D) 1 Tablet(s) Oral two times a day  cefpodoxime 200 milliGRAM(s) Oral every 12 hours  chlorhexidine 2% Cloths 1 Application(s) Topical <User Schedule>  enoxaparin Injectable 40 milliGRAM(s) SubCutaneous every 24 hours  lactulose Syrup 10 Gram(s) Oral daily  mineral oil/petrolatum Hydrophilic Ointment 1 Application(s) Topical daily  pantoprazole    Tablet 40 milliGRAM(s) Oral before breakfast  petrolatum Ophthalmic Ointment 1 Application(s) Both EYES two times a day  PHENobarbital 64.8 milliGRAM(s) Oral daily  polyethylene glycol 3350 17 Gram(s) Oral daily  senna 2 Tablet(s) Oral at bedtime    MEDICATIONS  (PRN):  acetaminophen     Tablet .. 650 milliGRAM(s) Oral every 6 hours PRN Temp greater or equal to 38C (100.4F), Mild Pain (1 - 3)  melatonin 3 milliGRAM(s) Oral at bedtime PRN Insomnia  ondansetron Injectable 4 milliGRAM(s) IV Push every 8 hours PRN Nausea and/or Vomiting      Allergies    No Known Allergies    Intolerances        LABS:                        12.5   6.36  )-----------( 297      ( 09 Jun 2025 06:37 )             37.2     06-09    143  |  106  |  16  ----------------------------<  106[H]  3.8   |  22  |  0.5[L]    Ca    8.8      09 Jun 2025 06:37  Mg     2.0     06-09    TPro  7.5  /  Alb  4.2  /  TBili  <0.2  /  DBili  x   /  AST  31  /  ALT  12  /  AlkPhos  76  06-08      Urinalysis Basic - ( 09 Jun 2025 06:37 )    Color: x / Appearance: x / SG: x / pH: x  Gluc: 106 mg/dL / Ketone: x  / Bili: x / Urobili: x   Blood: x / Protein: x / Nitrite: x   Leuk Esterase: x / RBC: x / WBC x   Sq Epi: x / Non Sq Epi: x / Bacteria: x        RADIOLOGY & ADDITIONAL TESTS:  Reviewed
SUBJECTIVE/OVERNIGHT EVENTS  Today is hospital day 1d. This morning patient was seen and examined at bedside, resting comfortably in bed. No acute or major events overnight.      MEDICATIONS  STANDING MEDICATIONS  albuterol    90 MICROgram(s) HFA Inhaler 2 Puff(s) Inhalation every 6 hours  ammonium lactate 12% Lotion 1 Application(s) Topical two times a day  azithromycin   Tablet 500 milliGRAM(s) Oral daily  baclofen 10 milliGRAM(s) Oral every 6 hours  calcium carbonate 1250 mG  + Vitamin D (OsCal 500 + D) 1 Tablet(s) Oral two times a day  cefTRIAXone   IVPB 1000 milliGRAM(s) IV Intermittent every 24 hours  enoxaparin Injectable 40 milliGRAM(s) SubCutaneous every 24 hours  lactulose Syrup 10 Gram(s) Oral daily  mineral oil/petrolatum Hydrophilic Ointment 1 Application(s) Topical daily  pantoprazole    Tablet 40 milliGRAM(s) Oral before breakfast  petrolatum Ophthalmic Ointment 1 Application(s) Both EYES two times a day  PHENobarbital 64.8 milliGRAM(s) Oral daily  polyethylene glycol 3350 17 Gram(s) Oral daily  senna 2 Tablet(s) Oral at bedtime    PRN MEDICATIONS  acetaminophen     Tablet .. 650 milliGRAM(s) Oral every 6 hours PRN  melatonin 3 milliGRAM(s) Oral at bedtime PRN  ondansetron Injectable 4 milliGRAM(s) IV Push every 8 hours PRN    VITALS  T(F): 98.9 (25 @ 06:59), Max: 98.9 (25 @ 16:59)  HR: 107 (25 @ 07:28) (105 - 115)  BP: 128/88 (25 @ 07:28) (114/- - 140/67)  RR: 18 (25 @ 07:28) (18 - 22)  SpO2: 96% (25 @ 07:28) (91% - 96%)    PHYSICAL EXAM  GENERAL: NAD, lying in bed comfortably  HEAD:  Atraumatic, normocephalic  EYES: EOMI, PERRLA, conjunctiva and sclera clear  ENT: Moist mucous membranes  NECK: Supple, no JVD  HEART: Regular rate and rhythm, +tachycardiac, no murmurs, rubs, or gallops  LUNGS: Unlabored respirations on RA.  Clear to auscultation bilaterally, no crackles, wheezing, or rhonchi  ABDOMEN: Soft, nontender, nondistended, +BS  EXTREMITIES: No clubbing, cyanosis, or edema. Contracted  NERVOUS SYSTEM:  Follows commands, able to communicate symptoms and answer questions  SKIN: No rashes or lesions    LABS             13.6   7.19  )-----------( 247      ( 25 @ 11:09 )             39.9     135  |  98  |  6   -------------------------<  92   25 @ 11:09  5.0  |  25  |  0.6    Ca      9.1     25 @ 11:09          Urinalysis Basic - ( 2025 07:20 )    Color: Yellow / Appearance: Clear / S.006 / pH: x  Gluc: x / Ketone: x  / Bili: Negative / Urobili: 0.2 mg/dL   Blood: x / Protein: Negative mg/dL / Nitrite: Negative   Leuk Esterase: Large / RBC: 0 /HPF / WBC 4 /HPF   Sq Epi: x / Non Sq Epi: 0 /HPF / Bacteria: Few /HPF          IMAGING
INTERVAL HPI/OVERNIGHT EVENTS:    SUBJECTIVE: Patient seen and examined at bedside.     no cp, sob, abd pain, fever    OBJECTIVE:    VITAL SIGNS:  Vital Signs Last 24 Hrs  T(C): 36.9 (08 Jun 2025 07:54), Max: 37.1 (07 Jun 2025 15:49)  T(F): 98.5 (08 Jun 2025 07:54), Max: 98.7 (07 Jun 2025 15:49)  HR: 110 (08 Jun 2025 07:54) (105 - 111)  BP: 123/73 (08 Jun 2025 07:54) (123/73 - 146/79)  BP(mean): 101 (08 Jun 2025 01:49) (101 - 101)  RR: 18 (08 Jun 2025 07:54) (18 - 18)  SpO2: 94% (08 Jun 2025 07:54) (94% - 97%)    Parameters below as of 08 Jun 2025 07:54  Patient On (Oxygen Delivery Method): room air          PHYSICAL EXAM:    General: NAD  HEENT: NC/AT; PERRL, clear conjunctiva  Neck: supple  Respiratory: CTA b/l  Cardiovascular: +S1/S2; RRR  Abdomen: soft, NT/ND; +BS x4  Extremities: WWP, 2+ peripheral pulses b/l; no LE edema  Skin: normal color and turgor; no rash  Neurological:    MEDICATIONS:  MEDICATIONS  (STANDING):  albuterol    90 MICROgram(s) HFA Inhaler 2 Puff(s) Inhalation every 6 hours  ammonium lactate 12% Lotion 1 Application(s) Topical two times a day  azithromycin   Tablet 500 milliGRAM(s) Oral daily  baclofen 10 milliGRAM(s) Oral every 6 hours  calcium carbonate 1250 mG  + Vitamin D (OsCal 500 + D) 1 Tablet(s) Oral two times a day  cefpodoxime 200 milliGRAM(s) Oral every 12 hours  enoxaparin Injectable 40 milliGRAM(s) SubCutaneous every 24 hours  lactulose Syrup 10 Gram(s) Oral daily  mineral oil/petrolatum Hydrophilic Ointment 1 Application(s) Topical daily  pantoprazole    Tablet 40 milliGRAM(s) Oral before breakfast  petrolatum Ophthalmic Ointment 1 Application(s) Both EYES two times a day  PHENobarbital 64.8 milliGRAM(s) Oral daily  polyethylene glycol 3350 17 Gram(s) Oral daily  senna 2 Tablet(s) Oral at bedtime    MEDICATIONS  (PRN):  acetaminophen     Tablet .. 650 milliGRAM(s) Oral every 6 hours PRN Temp greater or equal to 38C (100.4F), Mild Pain (1 - 3)  melatonin 3 milliGRAM(s) Oral at bedtime PRN Insomnia  ondansetron Injectable 4 milliGRAM(s) IV Push every 8 hours PRN Nausea and/or Vomiting      ALLERGIES:  Allergies    No Known Allergies    Intolerances        LABS:                        13.0   7.40  )-----------( 293      ( 08 Jun 2025 07:29 )             37.8     Hemoglobin: 13.0 g/dL (06-08 @ 07:29)  Hemoglobin: 12.7 g/dL (06-07 @ 11:19)  Hemoglobin: 13.6 g/dL (06-06 @ 11:09)    CBC Full  -  ( 08 Jun 2025 07:29 )  WBC Count : 7.40 K/uL  RBC Count : 4.39 M/uL  Hemoglobin : 13.0 g/dL  Hematocrit : 37.8 %  Platelet Count - Automated : 293 K/uL  Mean Cell Volume : 86.1 fL  Mean Cell Hemoglobin : 29.6 pg  Mean Cell Hemoglobin Concentration : 34.4 g/dL  Auto Neutrophil # : x  Auto Lymphocyte # : x  Auto Monocyte # : x  Auto Eosinophil # : x  Auto Basophil # : x  Auto Neutrophil % : x  Auto Lymphocyte % : x  Auto Monocyte % : x  Auto Eosinophil % : x  Auto Basophil % : x    06-08    141  |  104  |  14  ----------------------------<  115[H]  4.1   |  22  |  0.6[L]    Ca    9.1      08 Jun 2025 07:29  Mg     2.2     06-08    TPro  7.5  /  Alb  4.2  /  TBili  <0.2  /  DBili  x   /  AST  31  /  ALT  12  /  AlkPhos  76  06-08    Creatinine Trend: 0.6<--, 0.6<--, 0.6<--  LIVER FUNCTIONS - ( 08 Jun 2025 07:29 )  Alb: 4.2 g/dL / Pro: 7.5 g/dL / ALK PHOS: 76 U/L / ALT: 12 U/L / AST: 31 U/L / GGT: x           PT/INR - ( 07 Jun 2025 11:19 )   PT: 11.70 sec;   INR: 0.99 ratio         PTT - ( 07 Jun 2025 11:19 )  PTT:31.6 sec    hs Troponin:            Urinalysis Basic - ( 08 Jun 2025 07:29 )    Color: x / Appearance: x / SG: x / pH: x  Gluc: 115 mg/dL / Ketone: x  / Bili: x / Urobili: x   Blood: x / Protein: x / Nitrite: x   Leuk Esterase: x / RBC: x / WBC x   Sq Epi: x / Non Sq Epi: x / Bacteria: x      CSF:                      EKG:   MICROBIOLOGY:    Culture - Blood (collected 06 Jun 2025 12:00)  Source: Blood Blood-Peripheral  Preliminary Report (07 Jun 2025 22:02):    No growth at 24 hours    Culture - Blood (collected 06 Jun 2025 12:00)  Source: Blood Blood-Peripheral  Preliminary Report (07 Jun 2025 22:02):    No growth at 24 hours      IMAGING:      Labs, imaging, EKG personally reviewed    RADIOLOGY & ADDITIONAL TESTS: Reviewed.
Neuro

## 2025-06-09 NOTE — DISCHARGE NOTE PROVIDER - NSDCCPCAREPLAN_GEN_ALL_CORE_FT
PRINCIPAL DISCHARGE DIAGNOSIS  Diagnosis: Community acquired pneumonia  Assessment and Plan of Treatment: You presented to the hospital as you were having shortness of breath, workup showed that you had a lung infection called pneumonia for which you received antibiotics and your condition improved.  Please continue taking the antibiotics as instructed and follow up in the clinics with your PCP within 2 weeks from discharge.      SECONDARY DISCHARGE DIAGNOSES  Diagnosis: Pneumonia, aspiration  Assessment and Plan of Treatment:

## 2025-06-09 NOTE — DISCHARGE NOTE PROVIDER - HOSPITAL COURSE
Patient is a 69 year old male with PMH of CP, seizure disorder, hx of VRE UTI, BPH, suprapubic catheter (May 2024), PEG tube, bed/wheelchair bound who presents from group home for SOB x2 days associated with productive cough. Patient states this feels similar to when he had pneumonia.     ROS positive for SOB, negative for all other systems.    In the ED:  Vitals  /67    O2 sat 95% on RA  RR 22  Temp 98.8    Labs significant for Hgb 13.6 (baseline), MCV 85  EKG: NSR  CXR: elevated left hemidiaphragm unchanged from prior  CT chest: left lower lobe opacity/atelectasis, bilateral peribronchial thickening  RVP negative    Received 1L LR, 125mg IV solumedrol x1 dose, duonebs, 1g rocephin, 500mg azithromycin    Admitted to medicine for SIRS positive and SOB    During his stay, urine strep Ag came back positive. Patient's dyspnea resolved and was switched to oral antibiotic regimen instead of IV. Patient is saturating well on RA. Denies cough, shortness of breath, chest pain, or palpitations.  Noted to have dilated abdominal bowel loops on CXR, started on bowel regimen and now patient is having BM.  Patient noted to be tachycardia, ECG done and showed sinus tachycardia. No arrhythmias or ischemic changes noted.    Discussion of discharge plan of care, including discharge diagnoses, medication reconciliation, and follow-ups was conducted with Dr. Monzon on 6/9/2025 and discharge was approved.

## 2025-06-09 NOTE — DISCHARGE NOTE NURSING/CASE MANAGEMENT/SOCIAL WORK - NSDCFUADDAPPT_GEN_ALL_CORE_FT
Do you need a primary care doctor or follow-up with a specialist? Our care coordinators will help you find providers near you and schedule any follow-up care visits.    Monday-Friday: 9am-5pm    Call our [facility] team: [phone]

## 2025-06-09 NOTE — DISCHARGE NOTE PROVIDER - NSDCMRMEDTOKEN_GEN_ALL_CORE_FT
Albuterol (Eqv-ProAir HFA) 90 mcg/inh inhalation aerosol: 2 puff(s) inhaled every 6 hours  ammonium lactate topical lotion: Apply topically to affected area 2 times a day to feet  Aveeno Daily Moisturizing Bath topical powder: Apply topically to affected area once a day  baclofen 10 mg oral tablet: 1 tab(s) orally 4 times a day  Caladryl 8%-1% topical lotion: Apply topically to affected area 2 times a day as needed for  back dryness  cefpodoxime 200 mg oral tablet: 1 tab(s) orally every 12 hours Take 1 tab twice a day until 6/12 at night then stop.  DermaPhor topical ointment: Apply topically to affected area once a day  ketoconazole 2% topical shampoo: Apply topically to affected area 3 times a week  lactulose 10 g/15 mL oral syrup: 15 milliliter(s) orally once a day  magnesium hydroxide: 30 milliliter(s) orally once a day  miconazole 2% topical powder: Apply topically to affected area 2 times a day  ocular lubricant: 1 application in each eye 2 times a day  Oyster Shell Calcium with Vitamin D 500 mg-5 mcg (200 intl units) oral tablet: 1 tab(s) orally 2 times a day  pantoprazole 40 mg oral delayed release tablet: 1 tab(s) orally once a day (before a meal) as needed for acid reflux  Pedia-Lax Stool Softener 50 mg/15 mL oral syrup: 30 milliliter(s) orally once a day as needed for  constipation  PHENobarbital 64.8 mg oral tablet: 1 tab(s) orally once a day via G tube  Prolia 60 mg/mL subcutaneous solution: 60 milligram(s) subcutaneously every 6 months  Refresh ophthalmic solution: 1 drop(s) in each eye 2 times a day  senna leaf extract oral tablet: 2 tab(s) orally once a day (at bedtime)  vitamin D-calcium (as carbonate) 5 mcg-500 mg oral tablet: 1 tab(s) orally 2 times a day  Zithromax 250 mg oral tablet: 1 tab(s) orally once a day Take 1 tab on 6/10 then stop.

## 2025-06-09 NOTE — DISCHARGE NOTE NURSING/CASE MANAGEMENT/SOCIAL WORK - PATIENT PORTAL LINK FT
You can access the FollowMyHealth Patient Portal offered by [facility] by registering at the following website: [email or web address]. By joining [facility]’s FollowMyHealth portal, you will also be able to view your health information using other applications (apps) compatible with our system.

## 2025-06-09 NOTE — DISCHARGE NOTE NURSING/CASE MANAGEMENT/SOCIAL WORK - NSPROMEDSBROUGHTTOHOSP_GEN_A_NUR
Metoprolol 10mg IVP ordered and given. Pt was still in Afib but HR was decreasing to 88. Another metoprolol 10mg IVP ordered and given 16 minutes after last dose. no

## 2025-06-09 NOTE — DISCHARGE NOTE PROVIDER - CARE PROVIDER_API CALL
Yobani Morales  Internal Medicine  [address] [place] [address]  Phone: [phone]  Fax: [phone]  Follow Up Time: 2 weeks

## 2025-06-09 NOTE — DISCHARGE NOTE PROVIDER - NSDCFUSCHEDAPPT_GEN_ALL_CORE_FT
[facility]  UROLOGY [address] [facility]  Scheduled Appointment: 06/11/2025    Cami Taylor  [facility]  Scheduled Appointment: 06/20/2025    [facility]  SPEECHTHER  Carlos A  Scheduled Appointment: 06/20/2025    Jenaro Cooper  [facility]  OTOLARYNG [address]  Scheduled Appointment: 07/25/2025    Nickolas Hendricks  [facility]  UROLOGY [address] [place] [address]  Scheduled Appointment: 08/08/2025

## 2025-06-09 NOTE — DISCHARGE NOTE NURSING/CASE MANAGEMENT/SOCIAL WORK - NSDCPEFALRISK_GEN_ALL_CORE
For information on Fall & Injury Prevention, visit: [email or web address] OR  [email or web address] OR  https://www.cdc.gov/steadi/patient.html

## 2025-06-09 NOTE — DISCHARGE NOTE PROVIDER - NSDCPNSUBOBJ_GEN_ALL_CORE
Pt was seen and examined at the bedside. Spoke to Nurse from group home - update provided.  Strep Pneumonia - pt to complete Abx course.     Vital Signs Last 24 Hrs  T(C): 37.1 (09 Jun 2025 15:27), Max: 37.6 (09 Jun 2025 04:30)  T(F): 98.7 (09 Jun 2025 15:27), Max: 99.7 (09 Jun 2025 04:30)  HR: 102 (09 Jun 2025 15:27) (96 - 123)  BP: 113/73 (09 Jun 2025 15:27) (113/73 - 136/86)  BP(mean): 82 (09 Jun 2025 11:42) (82 - 103)  RR: 19 (09 Jun 2025 15:27) (18 - 19)  SpO2: 96% (09 Jun 2025 15:27) (94% - 96%)    Parameters below as of 09 Jun 2025 15:27  Patient On (Oxygen Delivery Method): room air    Physical Exam:  General: NAD  HEENT: clear conjunctiva  Neck: supple  Respiratory: CTA b/l  Cardiovascular: +S1/S2; RRR  Abdomen: soft, NT/ND  Extremities: no LE edema  Skin: normal color and turgor; no rash  Neurological:

## 2025-06-09 NOTE — PROCEDURE NOTE - NSAPPROACH_GEN_A_CORE
Detail Level: Generalized
via natural/artificial opening
Detail Level: Detailed
When Should The Patient Follow-Up For Their Next Full-Body Skin Exam?: 6 Months
Quality 137: Melanoma: Continuity Of Care - Recall System: Patient information entered into a recall system that includes: target date for the next exam specified AND a process to follow up with patients regarding missed or unscheduled appointments

## 2025-06-09 NOTE — PROGRESS NOTE ADULT - ASSESSMENT
69 year old male with PMH of CP, seizure disorder, hx of VRE UTI, BPH, suprapubic catheter (May 2024), PEG tube, bed/wheelchair bound who presents from group home for SOB x2 days associated with productive cough. Patient states this feels similar to when he had pneumonia. Pt denies other complaints at this time.    #SOB and cough 2/2 suspected (aspiration?) pneumonia  #SIRS+ POA  #Sinus tachycardia  - Pt with cough and SOB for 2 days  - In ED: , RR 22, satting 95% on RA, WBC 7k, lactate 1.3  - CXR no acute pulmonary process   - CT chest: No definite CT evidence for acute intrathoracic pathology. Left lower lobe and lingular opacities, possibly atelectasis.  - RVP negative  - s/p rocephin, azithro, solumedrol and nebs in ED  - Lung exam with no wheezing  - Based on data not a clear case of pna, also doubt asthma exacerbation but will cover with abx for now pending workup  - c/w rocephin and azithro  - F/u BCx, procal, MRSA  - Aspiration precautions, HOB > 45  - S&S eval (pt on PO diet in addition to PEG)  - Sinus Tachycardia --> Check D Dimer and LE duplex given pt is bedbound and immobile  - If d-dimer elevated consider CT angio PE protocol    #hx of suprapubic catheterization colonization  - follows with urology Dr Cooper/Dr Guaraddo    #constipation  - CXR with dilated abdominal colonic loops   - continue aggressive bowel regimen    #cerebral palsy  #spastic quadriplegia  - continue home phenobarb, baclofen, nutrients, and creams    # Misc  -DVT ppx: lovenox 40mg QD  -GI ppx: not indicated  -Diet: puree with moderately thick liquids, meds through PEG tube  -Activity: as tolerated 
#Suspected CAP  ct chest unrevealing, possible atelectasis  rvp neg  vantin, azithro  sputum cx, legionella (neg), strep positive, mrsa   va duplex negative, d-dimer neg  no hypoxia, satting well on ra  PT, discharge planning.    #H/O cerebral palsy.   -c/w baclofen 10 qid.    #Seizure disorder.   - c/w phenobarb 64.8 mg qd.    #BPH  s/p spc.    #PEG  - PEG feeding  - S/p S&S eval  - patient tolerating pureed diet    #DVT ppx: enoxaparin    Additional Information: #Progress Note Handoff  Pending (specify): PT, d/c planning  Family discussion: d/w pt at bedside  Disposition: group home .  
69M PMHx cerebral palsy, seizure disorder, BPH s/p SPC 5/2024, s/p PEG here with shortness of breath.

## 2025-06-09 NOTE — DISCHARGE NOTE PROVIDER - NSDCFUADDAPPT_GEN_ALL_CORE_FT
Do you need a primary care doctor or follow-up with a specialist? Our care coordinators will help you find providers near you and schedule any follow-up care visits.    Monday-Friday: 9am-5pm    Call our [facility] team: [phone]

## 2025-06-11 ENCOUNTER — APPOINTMENT (OUTPATIENT)
Dept: UROLOGY | Facility: CLINIC | Age: 70
End: 2025-06-11
Payer: MEDICARE

## 2025-06-11 PROCEDURE — 51705 CHANGE OF BLADDER TUBE: CPT

## 2025-06-15 DIAGNOSIS — K59.00 CONSTIPATION, UNSPECIFIED: ICD-10-CM

## 2025-06-15 DIAGNOSIS — J45.909 UNSPECIFIED ASTHMA, UNCOMPLICATED: ICD-10-CM

## 2025-06-15 DIAGNOSIS — J13 PNEUMONIA DUE TO STREPTOCOCCUS PNEUMONIAE: ICD-10-CM

## 2025-06-15 DIAGNOSIS — Z86.69 PERSONAL HISTORY OF OTHER DISEASES OF THE NERVOUS SYSTEM AND SENSE ORGANS: ICD-10-CM

## 2025-06-15 DIAGNOSIS — J69.0 PNEUMONITIS DUE TO INHALATION OF FOOD AND VOMIT: ICD-10-CM

## 2025-06-15 DIAGNOSIS — Z87.438 PERSONAL HISTORY OF OTHER DISEASES OF MALE GENITAL ORGANS: ICD-10-CM

## 2025-06-15 DIAGNOSIS — Z29.9 ENCOUNTER FOR PROPHYLACTIC MEASURES, UNSPECIFIED: ICD-10-CM

## 2025-06-15 DIAGNOSIS — R06.02 SHORTNESS OF BREATH: ICD-10-CM

## 2025-06-15 DIAGNOSIS — Z79.51 LONG TERM (CURRENT) USE OF INHALED STEROIDS: ICD-10-CM

## 2025-06-15 DIAGNOSIS — G80.0 SPASTIC QUADRIPLEGIC CEREBRAL PALSY: ICD-10-CM

## 2025-06-15 DIAGNOSIS — Z79.899 OTHER LONG TERM (CURRENT) DRUG THERAPY: ICD-10-CM

## 2025-06-15 DIAGNOSIS — R00.0 TACHYCARDIA, UNSPECIFIED: ICD-10-CM

## 2025-06-15 DIAGNOSIS — R65.10 SYSTEMIC INFLAMMATORY RESPONSE SYNDROME (SIRS) OF NON-INFECTIOUS ORIGIN WITHOUT ACUTE ORGAN DYSFUNCTION: ICD-10-CM

## 2025-06-15 DIAGNOSIS — G40.909 EPILEPSY, UNSPECIFIED, NOT INTRACTABLE, WITHOUT STATUS EPILEPTICUS: ICD-10-CM

## 2025-06-15 DIAGNOSIS — Z93.1 GASTROSTOMY STATUS: ICD-10-CM

## 2025-06-15 DIAGNOSIS — Z87.19 PERSONAL HISTORY OF OTHER DISEASES OF THE DIGESTIVE SYSTEM: ICD-10-CM

## 2025-06-15 DIAGNOSIS — Z74.01 BED CONFINEMENT STATUS: ICD-10-CM

## 2025-06-15 DIAGNOSIS — J98.6 DISORDERS OF DIAPHRAGM: ICD-10-CM

## 2025-06-20 ENCOUNTER — OUTPATIENT (OUTPATIENT)
Dept: OUTPATIENT SERVICES | Facility: HOSPITAL | Age: 70
LOS: 1 days | End: 2025-06-20
Payer: MEDICARE

## 2025-06-20 DIAGNOSIS — Z93.59 OTHER CYSTOSTOMY STATUS: Chronic | ICD-10-CM

## 2025-06-20 DIAGNOSIS — Z93.1 GASTROSTOMY STATUS: Chronic | ICD-10-CM

## 2025-06-20 DIAGNOSIS — R13.10 DYSPHAGIA, UNSPECIFIED: ICD-10-CM

## 2025-06-20 DIAGNOSIS — Z98.890 OTHER SPECIFIED POSTPROCEDURAL STATES: Chronic | ICD-10-CM

## 2025-06-20 PROCEDURE — 92610 EVALUATE SWALLOWING FUNCTION: CPT | Mod: GN

## 2025-06-21 DIAGNOSIS — R13.10 DYSPHAGIA, UNSPECIFIED: ICD-10-CM

## 2025-07-08 ENCOUNTER — OUTPATIENT (OUTPATIENT)
Dept: OUTPATIENT SERVICES | Facility: HOSPITAL | Age: 70
LOS: 1 days | End: 2025-07-08
Payer: MEDICARE

## 2025-07-08 DIAGNOSIS — Z93.59 OTHER CYSTOSTOMY STATUS: Chronic | ICD-10-CM

## 2025-07-08 DIAGNOSIS — Z98.890 OTHER SPECIFIED POSTPROCEDURAL STATES: Chronic | ICD-10-CM

## 2025-07-08 DIAGNOSIS — R13.10 DYSPHAGIA, UNSPECIFIED: ICD-10-CM

## 2025-07-08 DIAGNOSIS — Z93.1 GASTROSTOMY STATUS: Chronic | ICD-10-CM

## 2025-07-08 PROCEDURE — 74230 X-RAY XM SWLNG FUNCJ C+: CPT | Mod: 26

## 2025-07-08 PROCEDURE — 74230 X-RAY XM SWLNG FUNCJ C+: CPT

## 2025-07-09 ENCOUNTER — APPOINTMENT (OUTPATIENT)
Dept: UROLOGY | Facility: CLINIC | Age: 70
End: 2025-07-09
Payer: MEDICARE

## 2025-07-09 DIAGNOSIS — R13.10 DYSPHAGIA, UNSPECIFIED: ICD-10-CM

## 2025-07-09 PROCEDURE — 51705 CHANGE OF BLADDER TUBE: CPT

## 2025-08-06 ENCOUNTER — APPOINTMENT (OUTPATIENT)
Dept: UROLOGY | Facility: CLINIC | Age: 70
End: 2025-08-06
Payer: MEDICARE

## 2025-08-06 PROCEDURE — 51705 CHANGE OF BLADDER TUBE: CPT

## 2025-08-07 ENCOUNTER — APPOINTMENT (OUTPATIENT)
Dept: OTOLARYNGOLOGY | Facility: CLINIC | Age: 70
End: 2025-08-07
Payer: MEDICARE

## 2025-08-07 DIAGNOSIS — J39.2 OTHER DISEASES OF PHARYNX: ICD-10-CM

## 2025-08-07 DIAGNOSIS — H93.8X3 OTHER SPECIFIED DISORDERS OF EAR, BILATERAL: ICD-10-CM

## 2025-08-07 DIAGNOSIS — H61.23 IMPACTED CERUMEN, BILATERAL: ICD-10-CM

## 2025-08-07 PROCEDURE — 99213 OFFICE O/P EST LOW 20 MIN: CPT | Mod: 25

## 2025-08-07 PROCEDURE — 31575 DIAGNOSTIC LARYNGOSCOPY: CPT

## 2025-08-07 PROCEDURE — 69210 REMOVE IMPACTED EAR WAX UNI: CPT

## 2025-08-18 ENCOUNTER — APPOINTMENT (OUTPATIENT)
Dept: UROLOGY | Facility: CLINIC | Age: 70
End: 2025-08-18
Payer: MEDICARE

## 2025-08-18 VITALS
HEIGHT: 63 IN | TEMPERATURE: 97.3 F | RESPIRATION RATE: 15 BRPM | WEIGHT: 132.38 LBS | BODY MASS INDEX: 23.46 KG/M2 | DIASTOLIC BLOOD PRESSURE: 61 MMHG | SYSTOLIC BLOOD PRESSURE: 103 MMHG | HEART RATE: 65 BPM | OXYGEN SATURATION: 93 %

## 2025-08-18 DIAGNOSIS — N20.0 CALCULUS OF KIDNEY: ICD-10-CM

## 2025-08-18 DIAGNOSIS — Z00.00 ENCOUNTER FOR GENERAL ADULT MEDICAL EXAMINATION W/OUT ABNORMAL FINDINGS: ICD-10-CM

## 2025-08-18 PROCEDURE — 99401 PREV MED CNSL INDIV APPRX 15: CPT

## 2025-08-18 PROCEDURE — 99215 OFFICE O/P EST HI 40 MIN: CPT | Mod: 25

## 2025-08-18 RX ORDER — CRANBERRY FRUIT EXTRACT 12:1
POWDER (GRAM) MISCELLANEOUS
Qty: 1000 | Refills: 6 | Status: ACTIVE | COMMUNITY
Start: 2025-08-18 | End: 1900-01-01

## 2025-09-05 ENCOUNTER — APPOINTMENT (OUTPATIENT)
Dept: UROLOGY | Facility: CLINIC | Age: 70
End: 2025-09-05

## 2025-09-10 ENCOUNTER — APPOINTMENT (OUTPATIENT)
Dept: UROLOGY | Facility: CLINIC | Age: 70
End: 2025-09-10
Payer: MEDICARE

## 2025-09-10 PROCEDURE — 51705 CHANGE OF BLADDER TUBE: CPT
